# Patient Record
Sex: FEMALE | Race: OTHER | Employment: OTHER | URBAN - METROPOLITAN AREA
[De-identification: names, ages, dates, MRNs, and addresses within clinical notes are randomized per-mention and may not be internally consistent; named-entity substitution may affect disease eponyms.]

---

## 2017-06-26 ENCOUNTER — HOSPITAL ENCOUNTER (OUTPATIENT)
Dept: CT IMAGING | Age: 72
Discharge: HOME | End: 2017-06-26
Attending: INTERNAL MEDICINE
Payer: COMMERCIAL

## 2017-06-26 DIAGNOSIS — Z85.3 PERSONAL HISTORY OF MALIGNANT NEOPLASM OF BREAST: ICD-10-CM

## 2017-06-26 LAB — CREAT BLD-MCNC: 0.8 MG/DL (ref 0.6–1.3)

## 2017-06-26 PROCEDURE — 74011250636 HC RX REV CODE- 250/636: Performed by: INTERNAL MEDICINE

## 2017-06-26 PROCEDURE — 74011636320 HC RX REV CODE- 636/320: Performed by: INTERNAL MEDICINE

## 2017-06-26 PROCEDURE — 82565 ASSAY OF CREATININE: CPT

## 2017-06-26 PROCEDURE — 71260 CT THORAX DX C+: CPT

## 2017-06-26 RX ORDER — SODIUM CHLORIDE 9 MG/ML
50 INJECTION, SOLUTION INTRAVENOUS
Status: COMPLETED | OUTPATIENT
Start: 2017-06-26 | End: 2017-06-26

## 2017-06-26 RX ORDER — SODIUM CHLORIDE 0.9 % (FLUSH) 0.9 %
10 SYRINGE (ML) INJECTION
Status: COMPLETED | OUTPATIENT
Start: 2017-06-26 | End: 2017-06-26

## 2017-06-26 RX ADMIN — SODIUM CHLORIDE 50 ML/HR: 900 INJECTION, SOLUTION INTRAVENOUS at 07:14

## 2017-06-26 RX ADMIN — Medication 10 ML: at 07:14

## 2017-06-26 RX ADMIN — IOPAMIDOL 80 ML: 612 INJECTION, SOLUTION INTRAVENOUS at 07:14

## 2017-07-31 RX ORDER — DICLOFENAC SODIUM 75 MG/1
75 TABLET, DELAYED RELEASE ORAL 2 TIMES DAILY
Qty: 60 TAB | Refills: 11 | Status: SHIPPED | OUTPATIENT
Start: 2017-07-31 | End: 2018-07-25

## 2017-07-31 NOTE — TELEPHONE ENCOUNTER
Requested Prescriptions     Pending Prescriptions Disp Refills    diclofenac EC (VOLTAREN) 75 mg EC tablet 60 Tab 11     Sig: Take 1 Tab by mouth two (2) times a day.

## 2017-08-04 RX ORDER — HYDROCHLOROTHIAZIDE 25 MG/1
TABLET ORAL
Qty: 90 TAB | Refills: 0 | Status: SHIPPED | OUTPATIENT
Start: 2017-08-04 | End: 2017-10-30 | Stop reason: SDUPTHER

## 2017-08-24 PROBLEM — K58.9 IBS (IRRITABLE BOWEL SYNDROME): Status: ACTIVE | Noted: 2017-08-24

## 2017-08-24 PROBLEM — Z79.2 PROPHYLACTIC ANTIBIOTIC: Status: ACTIVE | Noted: 2017-08-24

## 2017-08-24 PROBLEM — K92.2 GI BLEED: Status: ACTIVE | Noted: 2017-08-24

## 2017-08-24 PROBLEM — I12.9 HYPERTENSION WITH RENAL DISEASE: Status: ACTIVE | Noted: 2017-08-24

## 2017-08-24 PROBLEM — M79.10 MYALGIA: Status: ACTIVE | Noted: 2017-08-24

## 2017-08-24 PROBLEM — N18.2 CKD (CHRONIC KIDNEY DISEASE), STAGE II: Status: ACTIVE | Noted: 2017-08-24

## 2017-08-24 PROBLEM — Z79.899 ON STATIN THERAPY: Status: ACTIVE | Noted: 2017-08-24

## 2017-08-24 PROBLEM — M35.3 POLYMYALGIA RHEUMATICA (HCC): Status: ACTIVE | Noted: 2017-08-24

## 2017-08-24 PROBLEM — M15.9 DJD (DEGENERATIVE JOINT DISEASE), MULTIPLE SITES: Status: ACTIVE | Noted: 2017-08-24

## 2017-08-24 PROBLEM — N32.81 OVERACTIVE BLADDER: Status: ACTIVE | Noted: 2017-08-24

## 2017-08-24 PROBLEM — M87.059 AVASCULAR NECROSIS OF HIP (HCC): Status: ACTIVE | Noted: 2017-08-24

## 2017-08-24 PROBLEM — R79.89 ABNORMAL LFTS: Status: ACTIVE | Noted: 2017-08-24

## 2017-08-24 RX ORDER — MUPIROCIN 20 MG/G
OINTMENT TOPICAL DAILY
COMMUNITY
End: 2017-10-02 | Stop reason: ALTCHOICE

## 2017-08-24 RX ORDER — CLINDAMYCIN HYDROCHLORIDE 300 MG/1
300 CAPSULE ORAL 3 TIMES DAILY
COMMUNITY
End: 2018-01-08 | Stop reason: ALTCHOICE

## 2017-08-24 RX ORDER — MOMETASONE FUROATE 1 MG/G
CREAM TOPICAL
COMMUNITY
End: 2019-03-20

## 2017-08-24 RX ORDER — LEVALBUTEROL 1.25 MG/.5ML
1.25 SOLUTION, CONCENTRATE RESPIRATORY (INHALATION) AS NEEDED
COMMUNITY
End: 2019-03-20

## 2017-08-24 RX ORDER — BISMUTH SUBSALICYLATE 262 MG
1 TABLET,CHEWABLE ORAL DAILY
COMMUNITY
End: 2020-03-18 | Stop reason: ALTCHOICE

## 2017-08-24 RX ORDER — AZELAIC ACID 0.15 G/G
GEL TOPICAL 2 TIMES DAILY
COMMUNITY
End: 2020-08-13 | Stop reason: ALTCHOICE

## 2017-08-24 RX ORDER — ACETYLCYSTEINE 600 MG
CAPSULE ORAL
COMMUNITY
End: 2017-10-02 | Stop reason: ALTCHOICE

## 2017-08-24 RX ORDER — ALBUTEROL SULFATE 90 UG/1
AEROSOL, METERED RESPIRATORY (INHALATION)
COMMUNITY
End: 2018-10-15 | Stop reason: ALTCHOICE

## 2017-08-24 RX ORDER — PRAVASTATIN SODIUM 40 MG/1
40 TABLET ORAL
COMMUNITY
End: 2018-01-22 | Stop reason: DRUGHIGH

## 2017-09-19 RX ORDER — OXAZEPAM 15 MG/1
CAPSULE ORAL
Qty: 60 CAP | Refills: 2 | Status: SHIPPED | OUTPATIENT
Start: 2017-09-19 | End: 2018-01-08 | Stop reason: ALTCHOICE

## 2017-09-19 NOTE — TELEPHONE ENCOUNTER
Requested Prescriptions     Pending Prescriptions Disp Refills    oxazepam (SERAX) 15 mg capsule [Pharmacy Med Name: OXAZEPAM 15MG CAPSULES] 60 Cap 2     Sig: TAKE 2 CAPSULES BY MOUTH EVERY NIGHT AT BEDTIME

## 2017-10-02 ENCOUNTER — OFFICE VISIT (OUTPATIENT)
Dept: INTERNAL MEDICINE CLINIC | Age: 72
End: 2017-10-02

## 2017-10-02 VITALS
RESPIRATION RATE: 18 BRPM | BODY MASS INDEX: 32.58 KG/M2 | DIASTOLIC BLOOD PRESSURE: 80 MMHG | OXYGEN SATURATION: 18 % | HEIGHT: 64 IN | SYSTOLIC BLOOD PRESSURE: 122 MMHG | TEMPERATURE: 97.9 F | HEART RATE: 78 BPM | WEIGHT: 190.8 LBS

## 2017-10-02 DIAGNOSIS — E78.2 MIXED HYPERLIPIDEMIA: ICD-10-CM

## 2017-10-02 DIAGNOSIS — I12.9 HYPERTENSION WITH RENAL DISEASE: ICD-10-CM

## 2017-10-02 DIAGNOSIS — Z12.11 COLON CANCER SCREENING: ICD-10-CM

## 2017-10-02 DIAGNOSIS — T75.89XA EFFECTS OF EXPOSURE TO EXTERNAL CAUSE, INITIAL ENCOUNTER: ICD-10-CM

## 2017-10-02 DIAGNOSIS — M15.9 PRIMARY OSTEOARTHRITIS INVOLVING MULTIPLE JOINTS: ICD-10-CM

## 2017-10-02 DIAGNOSIS — Z00.00 ANNUAL PHYSICAL EXAM: Primary | ICD-10-CM

## 2017-10-02 DIAGNOSIS — K58.9 IRRITABLE BOWEL SYNDROME, UNSPECIFIED TYPE: ICD-10-CM

## 2017-10-02 DIAGNOSIS — N18.2 CKD (CHRONIC KIDNEY DISEASE), STAGE II: ICD-10-CM

## 2017-10-02 DIAGNOSIS — J30.89 NON-SEASONAL ALLERGIC RHINITIS, UNSPECIFIED CHRONICITY, UNSPECIFIED TRIGGER: ICD-10-CM

## 2017-10-02 DIAGNOSIS — E11.9 TYPE 2 DIABETES MELLITUS WITHOUT COMPLICATION, WITHOUT LONG-TERM CURRENT USE OF INSULIN (HCC): ICD-10-CM

## 2017-10-02 DIAGNOSIS — E55.9 VITAMIN D DEFICIENCY: ICD-10-CM

## 2017-10-02 DIAGNOSIS — E66.09 CLASS 1 OBESITY DUE TO EXCESS CALORIES WITHOUT SERIOUS COMORBIDITY WITH BODY MASS INDEX (BMI) OF 32.0 TO 32.9 IN ADULT: ICD-10-CM

## 2017-10-02 DIAGNOSIS — Z23 ENCOUNTER FOR IMMUNIZATION: ICD-10-CM

## 2017-10-02 LAB
ALBUMIN SERPL-MCNC: 4.6 G/DL (ref 3.9–5.4)
ALKALINE PHOS POC: 89 U/L (ref 38–126)
ALT SERPL-CCNC: 69 U/L (ref 9–52)
AST SERPL-CCNC: 45 U/L (ref 14–36)
BACTERIA UA POCT, BACTPOCT: NORMAL
BILIRUB UR QL STRIP: NEGATIVE
BUN BLD-MCNC: 24 MG/DL (ref 7–17)
CALCIUM BLD-MCNC: 9.5 MG/DL (ref 8.4–10.2)
CASTS UA POCT: NORMAL
CHLORIDE BLD-SCNC: 104 MMOL/L (ref 98–107)
CHOLEST SERPL-MCNC: ABNORMAL MG/DL (ref 0–200)
CK (CPK) POC: 75 U/L (ref 30–135)
CLUE CELLS, CLUEPOCT: NEGATIVE
CO2 POC: 29 MMOL/L (ref 22–32)
CREAT BLD-MCNC: 0.7 MG/DL (ref 0.7–1.2)
CRYSTALS UA POCT, CRYSPOCT: NEGATIVE
EGFR (POC): 87.8
EPITHELIAL CELLS POCT: NORMAL
GLUCOSE POC: 142 MG/DL (ref 65–105)
GLUCOSE UR-MCNC: NEGATIVE MG/DL
GRAN# POC: 3.6 K/UL (ref 2–7.8)
GRAN% POC: 69.1 % (ref 37–92)
HBA1C MFR BLD HPLC: 6.7 % (ref 4.5–5.7)
HCT VFR BLD CALC: 40.5 % (ref 37–51)
HDLC SERPL-MCNC: 48 MG/DL (ref 35–130)
HGB BLD-MCNC: 13.8 G/DL (ref 12–18)
KETONES P FAST UR STRIP-MCNC: NEGATIVE MG/DL
LDL CHOLESTEROL POC: 151.6 MG/DL (ref 0–130)
LY# POC: 1.3 K/UL (ref 0.6–4.1)
LY% POC: 27.5 % (ref 10–58.5)
MCH RBC QN: 33.2 PG (ref 26–32)
MCHC RBC-ENTMCNC: 34 G/DL (ref 30–36)
MCV RBC: 97 FL (ref 80–97)
MICROALBUMIN UR TEST STR-MCNC: 20 MG/L (ref 0–20)
MID #, POC: 0.1 K/UL (ref 0–1.8)
MID% POC: 3.4 % (ref 0.1–24)
MUCUS UA POCT, MUCPOCT: NORMAL
PH UR STRIP: 6 [PH] (ref 5–7)
PLATELET # BLD: 247 K/UL (ref 140–440)
POTASSIUM SERPL-SCNC: 4 MMOL/L (ref 3.6–5)
PROT SERPL-MCNC: 7.5 G/DL (ref 6.3–8.2)
PROT UR QL STRIP: NORMAL MG/DL
RBC # BLD: 4.16 M/UL (ref 4.2–6.3)
RBC UA POCT, RBCPOCT: NORMAL
SODIUM SERPL-SCNC: 147 MMOL/L (ref 137–145)
SP GR UR STRIP: 1.02 (ref 1.01–1.02)
T4 FREE SERPL-MCNC: 1.01 NG/DL (ref 0.71–1.85)
TCHOL/HDL RATIO (POC): 5.2 (ref 0–4)
TOTAL BILIRUBIN POC: 0.7 MG/DL (ref 0.2–1.3)
TRICH UA POCT, TRICHPOC: NEGATIVE
TRIGL SERPL-MCNC: 252 MG/DL (ref 0–200)
TSH BLD-ACNC: 1.35 UIU/ML (ref 0.4–4.2)
UA UROBILINOGEN AMB POC: NORMAL (ref 0.2–1)
URINALYSIS CLARITY POC: CLEAR
URINALYSIS COLOR POC: NORMAL
URINE BLOOD POC: NEGATIVE
URINE CULT COMMENT, POCT: NORMAL
URINE LEUKOCYTES POC: NORMAL
URINE NITRITES POC: NEGATIVE
VITAMIN D POC: 54.3 NG/ML (ref 30–96)
VLDLC SERPL CALC-MCNC: 50.4 MG/DL
WBC # BLD: 5 K/UL (ref 4.1–10.9)
WBC UA POCT, WBCPOCT: NORMAL
YEAST UA POCT, YEASTPOC: NEGATIVE

## 2017-10-02 RX ORDER — MUPIROCIN 20 MG/G
OINTMENT TOPICAL DAILY
Qty: 22 G | Refills: 0 | Status: SHIPPED | OUTPATIENT
Start: 2017-10-02 | End: 2018-01-05 | Stop reason: ALTCHOICE

## 2017-10-02 RX ORDER — ERGOCALCIFEROL 1.25 MG/1
50000 CAPSULE ORAL
COMMUNITY
End: 2018-01-08 | Stop reason: ALTCHOICE

## 2017-10-02 NOTE — PROGRESS NOTES
1. Have you been to the ER, urgent care clinic since your last visit? Hospitalized since your last visit? No    2. Have you seen or consulted any other health care providers outside of the [address] [place] [address] since your last visit? Include any pap smears or colon screening. Neeta Moncada and Dr.Victora Moser, derm. Flu vaccine given    3 month follow up    Annual Wellness exam, not medicare.

## 2017-10-02 NOTE — PROGRESS NOTES
Annual Wellness Visit; Hypertension (3 month); and Cholesterol Problem       HPI:     Poly Estrada is a 81y.o. year old female who is here for her annual comprehensive healthcare exam and follow-up of medical problems to include hypertension, diabetes, hyperlipidemia, anxiety, chronic kidney disease stage II, DJD, irritable bowel, obesity, and allergic rhinitis. She does have a burn to the skin on her right breast reconstructive area from a heating pad about a month to ago. It does not seem to be healing but she notes no associated pain because she does not have pain in that area secondary to reconstructive surgery. She denies any chest pain shortness of breath or cardiorespiratory complaints. There are no GI/ complaints. There are no other complaints on complete review of systems. She is taking the medication trying to follow her diet and trying to get some exercise but could do better there. Visit Vitals    /80 (BP 1 Location: Left arm, BP Patient Position: Sitting)    Pulse 78    Temp 97.9 °F (36.6 °C) (Oral)    Resp 18    Ht 5' 4\" (1.626 m)    Wt 190 lb 12.8 oz (86.5 kg)    SpO2 (!) 18%    BMI 32.75 kg/m2       Past Medical History:   Diagnosis Date    Abnormal LFTs 8/24/2017    Arthritis     OSTEO    Avascular necrosis of hip (HCC) 8/24/2017    Breast CA (HCC)     BILATERAL    Chronic pain     CKD (chronic kidney disease), stage II 8/24/2017    Coagulation disorder ([facility] [address] 1984    ITP    Depression     Diabetes (HCC)     TYPE 2; NIDDM    DJD (degenerative joint disease), multiple sites 8/24/2017    GI bleed 8/24/2017    Hyperlipemia     Hypertension     Hypertension with renal disease 8/24/2017    IBS (irritable bowel syndrome) 8/24/2017    Myalgia 8/24/2017    On statin therapy 8/24/2017    Overactive bladder 8/24/2017    Pneumonia 04/2015    HOSPITALIZED 3 WEEKS.     Polymyalgia rheumatica ([facility] [address] 8/24/2017    Prophylactic antibiotic 8/24/2017    Rosacea Past Surgical History:   Procedure Laterality Date    BREAST SURGERY PROCEDURE UNLISTED      RECONSTRUCTION X2    HX APPENDECTOMY  1950    HX CATARACT REMOVAL Bilateral     W /IOL    HX GI      COLONOSCOPY    HX HEENT      WISDOM TEETH    HX HYSTERECTOMY  2000S    HX MASTECTOMY  1989    bilateral    HX MASTECTOMY  2001    HX ORTHOPAEDIC      back fusion 2008-LUMBAR    HX TUBAL LIGATION  1981    TOTAL HIP ARTHROPLASTY Left 11/16/2016    ANTERIOR APPROACH, DR Gwendolyn De La Torre; (POSTOP: STANDS ONE INCH TALLER ON LEFT FOOT)       Prior to Admission medications    Medication Sig Start Date End Date Taking? Authorizing Provider   ergocalciferol (VITAMIN D2) 50,000 unit capsule Take 50,000 Units by mouth. Take 1 capsule weekly for 12 weeks. Yes Historical Provider   mupirocin (BACTROBAN) 2 % ointment Apply  to affected area daily. 10/2/17  Yes Anitra Martin MD   oxazepam (SERAX) 15 mg capsule TAKE 2 CAPSULES BY MOUTH EVERY NIGHT AT BEDTIME 9/19/17  Yes Anitra Martin MD   albuterol (VENTOLIN HFA) 90 mcg/actuation inhaler Take  by inhalation. Yes Historical Provider   levalbuterol (XOPENEX) 1.25 mg/0.5 mL nebu 1.25 mg by Nebulization route. Yes Historical Provider   loratadine-pseudoephedrine (CLARITIN-D 12 HOUR) 5-120 mg per tablet Take 1 Tab by mouth two (2) times a day. Yes Historical Provider   clindamycin (CLEOCIN) 300 mg capsule Take 300 mg by mouth three (3) times daily. Yes Historical Provider   pravastatin (PRAVACHOL) 40 mg tablet Take 40 mg by mouth nightly. Yes Historical Provider   GLUCOSAMINE/MSM/CHONDROITIN A (GLUCOSAMINE HCL-MSM-CHONDROITN PO) Take  by mouth. Yes Historical Provider   multivitamin (ONE A DAY) tablet Take 1 Tab by mouth daily. Yes Historical Provider   SHARK CARTILAGE PO Take  by mouth. Yes Historical Provider   azelaic acid (FINACEA) 15 % topical gel Apply  to affected area two (2) times a day.    Yes Historical Provider   hydroCHLOROthiazide (HYDRODIURIL) 25 mg tablet TAKE 1 TABLET BY MOUTH DAILY 8/4/17  Yes Swapna Carbone MD   diclofenac EC (VOLTAREN) 75 mg EC tablet Take 1 Tab by mouth two (2) times a day. 7/31/17  Yes Swapna Carbone MD   aspirin delayed-release 325 mg tablet Take 1 Tab by mouth two (2) times a day. Patient taking differently: Take 325 mg by mouth daily as needed. 12/29/16  Yes Giovanny Guerrier MD   metFORMIN ER (GLUCOPHAGE XR) 500 mg tablet Take 500 mg by mouth two (2) times a day. Yes Historical Provider   sertraline (ZOLOFT) 100 mg tablet Take 100 mg by mouth daily. Yes Historical Provider   carvedilol (COREG) 6.25 mg tablet Take 3.125 mg by mouth two (2) times daily (with meals). Yes Historical Provider   clindamycin (CLINDAGEL) 1 % topical gel Apply  to affected area two (2) times a day. use thin film on affected area   Yes Historical Provider   buPROPion XL (WELLBUTRIN XL) 150 mg tablet Take 150 mg by mouth every morning. Yes Historical Provider   mometasone (ELOCON) 0.1 % topical cream Apply  to affected area daily. Historical Provider        Allergies   Allergen Reactions    Statins-Hmg-Coa Reductase Inhibitors Myalgia     Myalgia with pravachol and multiple statins    Codeine Rash     Rash on thighs    Darvon [Propoxyphene] Other (comments)     \"I see worms\"    Pcn [Penicillins] Rash    Sulfa (Sulfonamide Antibiotics) Rash        Family History   Problem Relation Age of Onset    Heart Disease Mother     Kidney Disease Mother     Lung Disease Mother      COPD    Anesth Problems Neg Hx        Social History     Social History    Marital status:      Spouse name: N/A    Number of children: N/A    Years of education: N/A     Occupational History    Not on file.      Social History Main Topics    Smoking status: Never Smoker    Smokeless tobacco: Never Used    Alcohol use No    Drug use: No    Sexual activity: No     Other Topics Concern    Not on file     Social History Narrative        ROS: Constitutional: She denies fevers, weight loss, sweats. Eyes: No blurred or double vision. ENT: No difficulty with swallowing, taste, speech or smell. NECK: no stiffness swelling or lymph node enlargement  Respiratory: No cough wheezing or shortness of breath. Cardiovascular: Denies chest pain, palpitations, unexplained indigestion or syncope. Breast: She has noted no masses or lumps and no discharge or axillary swelling  Gastrointestinal:  No changes in bowel movements, no abdominal pain, no bloating. Genitourinary: No discharge or abnormal bleeding or pain  Extremities: No joint pain, stiffness or swelling. Neurological:  No numbness, tingling, burring paresthesias or loss of motor strength. No syncope, dizziness or frequent headache  Skin:  No recent rashes or mole changes. Burn right upper outer quadrant of the right breast  Psychiatric/Behavioral:  Negative for depression. The patient is not nervous/anxious. HEMATOLOGIC: no easy bruising or bleeding gums  Endocrine: no sweats of urinary frequency or excessive thirst      Physical Examination:     Vitals:    10/02/17 0826   BP: 122/80   Pulse: 78   Resp: 18   Temp: 97.9 °F (36.6 °C)   TempSrc: Oral   SpO2: (!) 18%   Weight: 190 lb 12.8 oz (86.5 kg)   Height: 5' 4\" (1.626 m)   PainSc:   0 - No pain        General appearance - alert, well appearing, and in no distress  Mental status - alert, oriented to person, place, and time  HEENT:  Ears - bilateral TM's and external ear canals clear  Eyes - pupillary responses were normal.  Extraocular muscle function intact. Lids and conjunctiva not injected. Fundoscopic exam revealed sharp disc margins. eye movements intact  Pharynx- clear with teeth in good repair. No masses were noted  Neck - supple without thyromegaly or burit. No JVD noted  Lungs - clear to auscultation and percussion  Cardiac- normal rate, regular rhythm without murmurs. PMI not displaced.   No gallop, rub or click  Breast: Status post bilateral mastectomy reconstruction. Abdomen - flat, soft, non-tender without palpable organomegaly or mass. No pulsatile mass was felt, and not bruit was heard. Bowel sounds were active   Female - deferred to GYN  Rectal - deferred to GYN  Extremities -  no clubbing cyanosis or edema. No diabetic foot changes noted  Lymphatics - no palpable lymphadenopathy, no hepatosplenomegaly  Peripheral vascular - Dorsalis pedis and posterior tibial pulses felt without difficulty  Skin - no rash or unusual mole change noted. 3 cm slightly irregular circular area right upper outer quadrant right breast from a burn no drainage noted. Neurological - Cranial nerves II-XII grossly intact. Motor strength 5/5. DTR's 2+ and symmetric. Station and gait normal.  Normal distal sensation and proprioception all toes both feet  Back exam - full range of motion, no tenderness, palpable spasm or pain on motion  Musculoskeletal - no joint tenderness, deformity or swelling  Hematologic: no purpura, petechiae or bruising    Assessment/Plan:     1. Annual physical exam    2. Hypertension with renal disease    3. Mixed hyperlipidemia    4. Primary osteoarthritis involving multiple joints    5. Type 2 diabetes mellitus without complication, without long-term current use of insulin ([facility] [address]    6. CKD (chronic kidney disease), stage II    7. Class 1 obesity due to excess calories without serious comorbidity with body mass index (BMI) of 32.0 to 32.9 in adult    8. Irritable bowel syndrome, unspecified type    9. Non-seasonal allergic rhinitis, unspecified chronicity, unspecified trigger    10. Vitamin D deficiency    11. Effects of exposure to external cause, initial encounter    12. Colon cancer screening    13. Encounter for immunization        Impression  1. Annual physical examination normal except for obesity still an issue weight 198.8 needs to come down with discussed diet exercise weight reduction  2.   Hypertension that is controlled  3. Hyperlipidemia we will see what the status is  4. Diabetes repeat status pending reviewed prior labs with her  5. DJD seems stable  6. CKD stage II status pending  7. Obesity has discussed diet exercise weight reduction  8. IBS  9. Allergic rhinitis stable  10. Vitamin D deficiency we will check that level today  Burn right breast will try some Bactroban ointment for 2 weeks and then use a Telfa pad and dry dressing after that  Flu vaccine given today. Labs pending as noted will make further recommendations based upon those of all stable continue same and I will recheck her in 3 months or sooner should be a problem. Orders Placed This Encounter    Influenza virus vaccine (FLUZONE HIGH-DOSE) 65 years and older (16991)    HEPATITIS C AB    AMB POC FECAL OCCULT BLOOD QL-3 CARDS    AMB POC URINE, MICROALBUMIN, SEMIQUANT (1 RESULT)    AMB POC COMPREHENSIVE METABOLIC PANEL    AMB POC COMPLETE CBC,AUTOMATED ENTER    AMB POC CK (CPK)    AMB POC HEMOGLOBIN A1C    AMB POC LIPID PROFILE    AMB POC T4, FREE    AMB POC URINALYSIS DIP STICK AUTO W/ MICRO     AMB POC TSH    AMB POC VITAMIN D    OR IMMUNIZ ADMIN,1 SINGLE/COMB VAC/TOXOID    ergocalciferol (VITAMIN D2) 50,000 unit capsule     Sig: Take 50,000 Units by mouth. Take 1 capsule weekly for 12 weeks.  mupirocin (BACTROBAN) 2 % ointment     Sig: Apply  to affected area daily.      Dispense:  22 g     Refill:  0        Results for orders placed or performed in visit on 10/02/17   AMB POC URINE, MICROALBUMIN, SEMIQUANT (1 RESULT)   Result Value Ref Range    Microalbumin urine (POC)  0 - 20 MG/L   AMB POC COMPREHENSIVE METABOLIC PANEL   Result Value Ref Range    GLUCOSE  65 - 105 mg/dL    BUN  7 - 17 mg/dL    Creatinine (POC)  0.7 - 1.2 mg/dL    Sodium (POC)  137 - 145 MMOL/L    Potassium (POC)  3.6 - 5.0 MMOL/L    CHLORIDE  98 - 107 MMOL/L    CO2  22 - 32 MMOL/L    CALCIUM  8.4 - 10.2 mg/dL    TOTAL PROTEIN  6.3 - 8.2 g/dL    ALBUMIN 3.9 - 5.4 g/dL    AST (POC)  14 - 36 U/L    ALT (POC)  9 - 52 U/L    ALKALINE PHOS  38 - 126 U/L    TOTAL BILIRUBIN  0.2 - 1.3 mg/dL    eGFR (POC)     AMB POC COMPLETE CBC,AUTOMATED ENTER   Result Value Ref Range    WBC (POC)  4.1 - 10.9 K/uL    RBC (POC)  4.20 - 6.30 M/uL    HGB (POC)  12.0 - 18.0 g/dL    HCT (POC)  37.0 - 51.0 %    MCV (POC)  80.0 - 97.0 fL    MCH (POC)  26.0 - 32.0 pg    MCHC (POC)  30.0 - 36.0 g/dL    PLATELET (POC)  198.6 - 440.0 K/uL    ABS. LYMPHS (POC)  0.6 - 4.1 K/uL    LYMPHOCYTES (POC)  10.0 - 58.5 %    Mid # (POC)  0.0 - 1.8 K/uL    MID% POC  0.1 - 24.0 %    ABS.  GRANS (POC)  2.0 - 7.8 K/uL    GRANULOCYTES (POC)  37.0 - 92.0 %   AMB POC CK (CPK)   Result Value Ref Range    CK (CPK) (POC)  30 - 135 U/L   AMB POC HEMOGLOBIN A1C   Result Value Ref Range    Hemoglobin A1c (POC)  4.5 - 5.7 %   AMB POC LIPID PROFILE   Result Value Ref Range    Cholesterol (POC)  0 - 200 mg/dL    Triglycerides (POC)  0 - 200 mg/dL    HDL Cholesterol (POC)  35 - 130 mg/dL    VLDL (POC)  MG/DL    LDL Cholesterol (POC)  0.0 - 130.0 MG/DL    TChol/HDL Ratio (POC)  0.0 - 4.0   AMB POC T4, FREE   Result Value Ref Range    FREE T4 (POC)  0.71 - 1.85 ng/dL   AMB POC URINALYSIS DIP STICK AUTO W/ MICRO    Result Value Ref Range    Color (UA POC)      Clarity (UA POC)      Glucose (UA POC)  Negative    Bilirubin (UA POC)  Negative    Ketones (UA POC)  Negative    Specific gravity (UA POC)  1.010 - 1.025    Blood (UA POC)  Negative    pH (UA POC)  5.0 - 7.0    Protein (UA POC)  Negative mg/dL    Urobilinogen (UA POC)  0.2 - 1    Nitrites (UA POC)  Negative    Leukocyte esterase (UA POC)  Negative    Epithelial cells (UA POC)      Mucus (UA POC)      WBCs (UA POC)      RBCs (UA POC)      Casts (UA POC)  Negative    Crystals (UA POC)  Negative    Clue Cells (UA POC)      Trichomonas (UA POC)      Yeast (UA POC)      Bacteria (UA POC)  Negative    URINE CULT COMMENT (UA POC)     AMB POC TSH   Result Value Ref Range    TSH POC  0.40 - 4.20 uIU/ml   AMB POC VITAMIN D   Result Value Ref Range    Vitamin D (POC)  30 - 96 ng/mL       I have reviewed the patient's medical history in detail and updated the computerized patient record. We had a prolonged discussion about these complex clinical issues and went over the various important aspects to consider. All questions were answered. Advised her to call back or return to office if symptoms do not improve, change in nature, or persist.    She was given an after visit summary or informed of Knowable Access which includes patient instructions, diagnoses, current medications, & vitals. She expressed understanding with the diagnosis and plan.       Jessica Blanco MD

## 2017-10-03 LAB — HCV AB S/CO SERPL IA: <0.1 S/CO RATIO (ref 0–0.9)

## 2017-10-26 RX ORDER — BUPROPION HYDROCHLORIDE 150 MG/1
TABLET ORAL
Qty: 90 TAB | Refills: 0 | Status: SHIPPED | OUTPATIENT
Start: 2017-10-26 | End: 2018-07-06 | Stop reason: SDUPTHER

## 2017-10-30 DIAGNOSIS — I10 HYPERTENSION, UNSPECIFIED TYPE: Primary | ICD-10-CM

## 2017-10-31 RX ORDER — HYDROCHLOROTHIAZIDE 25 MG/1
TABLET ORAL
Qty: 90 TAB | Refills: 3 | Status: SHIPPED | OUTPATIENT
Start: 2017-10-31 | End: 2019-01-17 | Stop reason: SDUPTHER

## 2017-10-31 NOTE — TELEPHONE ENCOUNTER
Requested Prescriptions     Pending Prescriptions Disp Refills    hydroCHLOROthiazide (HYDRODIURIL) 25 mg tablet [Pharmacy Med Name: HYDROCHLOROTHIAZIDE 25MG TABLETS] 90 Tab 3     Sig: TAKE 1 TABLET BY MOUTH DAILY

## 2018-01-05 ENCOUNTER — OFFICE VISIT (OUTPATIENT)
Dept: INTERNAL MEDICINE CLINIC | Age: 73
End: 2018-01-05

## 2018-01-05 VITALS
WEIGHT: 194 LBS | DIASTOLIC BLOOD PRESSURE: 88 MMHG | SYSTOLIC BLOOD PRESSURE: 132 MMHG | RESPIRATION RATE: 18 BRPM | TEMPERATURE: 98.4 F | BODY MASS INDEX: 33.12 KG/M2 | HEART RATE: 84 BPM | HEIGHT: 64 IN | OXYGEN SATURATION: 97 %

## 2018-01-05 DIAGNOSIS — J01.00 ACUTE NON-RECURRENT MAXILLARY SINUSITIS: Primary | ICD-10-CM

## 2018-01-05 DIAGNOSIS — E66.09 CLASS 1 OBESITY DUE TO EXCESS CALORIES WITHOUT SERIOUS COMORBIDITY WITH BODY MASS INDEX (BMI) OF 32.0 TO 32.9 IN ADULT: ICD-10-CM

## 2018-01-05 PROBLEM — F33.9 RECURRENT DEPRESSION (HCC): Status: ACTIVE | Noted: 2018-01-05

## 2018-01-05 RX ORDER — AZITHROMYCIN 250 MG/1
250 TABLET, FILM COATED ORAL SEE ADMIN INSTRUCTIONS
Qty: 6 TAB | Refills: 0 | Status: SHIPPED | OUTPATIENT
Start: 2018-01-05 | End: 2018-01-10

## 2018-01-05 NOTE — PROGRESS NOTES
Subjective:   Day Umanzor is a 81 y.o. female      No chief complaint on file. History of present illness: She presents complaining a lot of head and nasal congestion a lot of sinus drainage. She does note some resultant cough. There may been a low-grade fever she has had no chills. This is been going on now for at least a week and she just cannot kick it at home. She denies any other complaints on complete review of systems. Patient Active Problem List   Diagnosis Code    Diabetes ([facility] E11.9    Allergic rhinitis J30.9    Obesity E66.9    Rosacea L71.9    Depression F32.9    Hyperlipidemia E78.5    Anxiety F41.9    Pneumonia J18.9    GI bleed K92.2    Overactive bladder N32.81    Polymyalgia rheumatica ([facility] M35.3    On statin therapy Z79.899    IBS (irritable bowel syndrome) K58.9    Hypertension with renal disease I12.9    DJD (degenerative joint disease), multiple sites M15.9    CKD (chronic kidney disease), stage II N18.2    Avascular necrosis of hip (HCC) M87.059    Abnormal LFTs R79.89    Annual physical exam Z00.00    Vitamin D deficiency E55.9    Recurrent depression ([facility]) F33.9    Acute non-recurrent maxillary sinusitis J01.00      Past Medical History:   Diagnosis Date    Abnormal LFTs 8/24/2017    Arthritis     OSTEO    Avascular necrosis of hip (HCC) 8/24/2017    Breast CA (HCC)     BILATERAL    Chronic pain     CKD (chronic kidney disease), stage II 8/24/2017    Coagulation disorder ([facility] 1984    ITP    Depression     Diabetes (HCC)     TYPE 2; NIDDM    DJD (degenerative joint disease), multiple sites 8/24/2017    GI bleed 8/24/2017    Hyperlipemia     Hypertension     Hypertension with renal disease 8/24/2017    IBS (irritable bowel syndrome) 8/24/2017    Myalgia 8/24/2017    On statin therapy 8/24/2017    Overactive bladder 8/24/2017    Pneumonia 04/2015    HOSPITALIZED 3 WEEKS.     Polymyalgia rheumatica (HCC) 8/24/2017    Prophylactic antibiotic 8/24/2017    Rosacea       Allergies   Allergen Reactions    Statins-Hmg-Coa Reductase Inhibitors Myalgia     Myalgia with pravachol and multiple statins    Codeine Rash     Rash on thighs    Darvon [Propoxyphene] Other (comments)     \"I see worms\"    Pcn [Penicillins] Rash    Sulfa (Sulfonamide Antibiotics) Rash      Family History   Problem Relation Age of Onset    Heart Disease Mother     Kidney Disease Mother     Lung Disease Mother      COPD    Anesth Problems Neg Hx       Social History     Social History    Marital status:      Spouse name: N/A    Number of children: N/A    Years of education: N/A     Occupational History    Not on file. Social History Main Topics    Smoking status: Never Smoker    Smokeless tobacco: Never Used    Alcohol use No    Drug use: No    Sexual activity: No     Other Topics Concern    Not on file     Social History Narrative     Prior to Admission medications    Medication Sig Start Date End Date Taking? Authorizing Provider   azithromycin (ZITHROMAX) 250 mg tablet Take 1 Tab by mouth See Admin Instructions for 5 days. Take 2 tablets the first day, then 1 tablet daily for the next four days. 1/5/18 1/10/18 Yes Eva Wilson MD   hydroCHLOROthiazide (HYDRODIURIL) 25 mg tablet TAKE 1 TABLET BY MOUTH DAILY 10/31/17  Yes Eva Wilson MD   buPROPion XL (WELLBUTRIN XL) 150 mg tablet TAKE 1 TABLET BY MOUTH EVERY DAY 10/26/17  Yes Eva Wilson MD   ergocalciferol (VITAMIN D2) 50,000 unit capsule Take 50,000 Units by mouth. Take 1 capsule weekly for 12 weeks. Yes Historical Provider   oxazepam (SERAX) 15 mg capsule TAKE 2 CAPSULES BY MOUTH EVERY NIGHT AT BEDTIME 9/19/17  Yes Eva Wilson MD   albuterol (VENTOLIN HFA) 90 mcg/actuation inhaler Take  by inhalation. Yes Historical Provider   levalbuterol (XOPENEX) 1.25 mg/0.5 mL nebu 1.25 mg by Nebulization route.    Yes Historical Provider   loratadine-pseudoephedrine (CLARITIN-D 12 HOUR) 5-120 mg per tablet Take 1 Tab by mouth two (2) times a day. Yes Historical Provider   clindamycin (CLEOCIN) 300 mg capsule Take 300 mg by mouth three (3) times daily. Yes Historical Provider   pravastatin (PRAVACHOL) 40 mg tablet Take 40 mg by mouth nightly. Yes Historical Provider   GLUCOSAMINE/MSM/CHONDROITIN A (GLUCOSAMINE HCL-MSM-CHONDROITN PO) Take  by mouth. Yes Historical Provider   multivitamin (ONE A DAY) tablet Take 1 Tab by mouth daily. Yes Historical Provider   SHARK CARTILAGE PO Take  by mouth. Yes Historical Provider   mometasone (ELOCON) 0.1 % topical cream Apply  to affected area daily. Yes Historical Provider   azelaic acid (FINACEA) 15 % topical gel Apply  to affected area two (2) times a day. Yes Historical Provider   diclofenac EC (VOLTAREN) 75 mg EC tablet Take 1 Tab by mouth two (2) times a day. 7/31/17  Yes Sofia Knowles MD   aspirin delayed-release 325 mg tablet Take 1 Tab by mouth two (2) times a day. Patient taking differently: Take 325 mg by mouth daily as needed. 12/29/16  Yes Peter Garnica MD   metFORMIN ER (GLUCOPHAGE XR) 500 mg tablet Take 500 mg by mouth two (2) times a day. Yes Historical Provider   sertraline (ZOLOFT) 100 mg tablet Take 100 mg by mouth daily. Yes Historical Provider   carvedilol (COREG) 6.25 mg tablet Take 3.125 mg by mouth two (2) times daily (with meals). Yes Historical Provider   clindamycin (CLINDAGEL) 1 % topical gel Apply  to affected area two (2) times a day. use thin film on affected area   Yes Historical Provider        Review of Systems              Constitutional:  She denies fever, weight loss, sweats or fatigue. EYES: No blurred or double vision,               ENT: Positive ahead and nasal congestion, no headache or dizziness. No difficulty with               swallowing, taste, speech or smell. Respiratory: Postnasal drainage with some cough without wheezing or shortness of breath.   No sputum production. Cardiac:  Denies chest pain, palpitations, unexplained indigestion, syncope, edema, PND or orthopnea. GI:  No changes in bowel movements, no abdominal pain, no bloating, anorexia, nausea, vomiting or heartburn. :  No frequency or dysuria. Denies incontinence or sexual dysfunction. Extremities:  No joint pain, stiffness or swelling  Back:.no pain or soreness  Skin:  No recent rashes or mole changes. Neurological:  No numbness, tingling, burning paresthesias or loss of motor strength. No syncope, dizziness, frequent headaches or memory loss. Hematologic:  No easy bruising  Lymphatic: No lymph node enlargement    Objective:     Vitals:    01/05/18 0936   BP: 132/88   Pulse: 84   Resp: 18   Temp: 98.4 °F (36.9 °C)   SpO2: 97%   Weight: 194 lb (88 kg)   Height: 5' 4\" (1.626 m)       Body mass index is 33.3 kg/(m^2). Physical Examination:              General Appearance:  Well-developed, well-nourished, no acute distress. HEENT:      Ears:  The TMs and ear canals were clear. Eyes:  The pupillary responses were normal.  Extraocular muscle function intact. Lids and conjunctiva not injected. Funduscopic exam revealed sharp disc margins. Nares: Inflamed mildly edematous  Pharynx:  Clear with teeth in good repair. No masses were noted. Neck:  Supple without thyromegaly or adenopathy. No JVD noted. No carotid                bruits. Lungs:  Clear to auscultation and percussion. Cardiac:  Regular rate and rhythm without murmur. PMI not displaced. No gallop, rub or click. Abdominal: Soft, non-tender, no hepata-spleenomegally or masses  Extremities:  No clubbing, cyanosis or edema. Skin:  No rash or unusual mole changes noted. Lymph Nodes:  None felt in the cervical, supraclavicular, axillary or inguinal region. Neurological: . DTRs 2+ and symmetric. Station and gait normal.   Hematologic:   No purpura or petechiae        Assessment/Plan:         1.  Acute non-recurrent maxillary sinusitis    2. Class 1 obesity due to excess calories without serious comorbidity with body mass index (BMI) of 32.0 to 32.9 in adult        Impressions/Plan:  Impression  1. Acute maxillary sinusitis we will treat this with his Zithromax Z-Brendon  2. Obesity we discussed importance of diet exercise weight reduction getting weight down which is unfortunately up 4 pounds since her last visit here  Recheck if not resolved with the above treatment otherwise per previous schedule. Orders Placed This Encounter    azithromycin (ZITHROMAX) 250 mg tablet       Follow-up Disposition:  Return if symptoms worsen or fail to improve. No results found for any visits on 01/05/18. Manny Hills MD    The patient was given after the visit summary the patient verbalized an understanding of the plans and problems as explained.

## 2018-01-05 NOTE — MR AVS SNAPSHOT
Visit Information Date & Time Provider Department Dept. Phone Encounter #  
 1/5/2018  9:10 AM Ellen Asif MD [place] [id number] [phone] [id number] Follow-up Instructions Return if symptoms worsen or fail to improve. Follow-up and Disposition History Your Appointments 1/15/2018  9:50 AM  
FOLLOW UP 10 with MD ADRIANA Doty [facility] ([facility]) Appt Note: [address] [facility] [address] [id number] [address] [facility] [address] Upcoming Health Maintenance Date Due DTaP/Tdap/Td series (1 - Tdap) 1/15/1968 FOBT Q 1 YEAR AGE 50-75 1/15/1997 ZOSTER VACCINE AGE 60> 11/15/2006 MEDICARE YEARLY EXAM 1/15/2012 HEMOGLOBIN A1C Q6M 4/2/2018 EYE EXAM RETINAL OR DILATED Q1 5/15/2018 FOOT EXAM Q1 10/2/2018 MICROALBUMIN Q1 10/2/2018 LIPID PANEL Q1 10/2/2018 GLAUCOMA SCREENING Q2Y 5/15/2019 BREAST CANCER SCRN MAMMOGRAM 10/2/2019 Allergies as of 1/5/2018  Review Complete On: 1/5/2018 By: Ellen Asif MD  
  
 Severity Noted Reaction Type Reactions Statins-hmg-coa Reductase Inhibitors High 11/20/2016    Myalgia Myalgia with pravachol and multiple statins Codeine  04/20/2015    Rash Rash on thighs Darvon [Propoxyphene]  04/20/2015    Other (comments) \"I see worms\" Pcn [Penicillins]  04/20/2015    Rash  
 Sulfa (Sulfonamide Antibiotics)  04/20/2015    Rash Current Immunizations  Reviewed on 5/6/2015 Name Date Influenza High Dose Vaccine PF 10/2/2017 Influenza Vaccine 11/2/2015 Pneumococcal Conjugate (PCV-13) 7/27/2015 Pneumococcal Vaccine (Unspecified Type) 1/1/2013, 12/23/2011 Not reviewed this visit You Were Diagnosed With   
  
 Codes Comments Acute non-recurrent maxillary sinusitis    -  Primary ICD-10-CM: J01.00 ICD-9-CM: 461.0 Class 1 obesity due to excess calories without serious comorbidity with body mass index (BMI) of 32.0 to 32.9 in adult     ICD-10-CM: E66.09, Z68.32 
ICD-9-CM: 278.00, V85.32 Vitals BP Pulse Temp Resp Height(growth percentile) Weight(growth percentile) 132/88 (BP 1 Location: Left arm, BP Patient Position: Sitting) 84 98.4 °F (36.9 °C) 18 5' 4\" (1.626 m) 194 lb (88 kg) SpO2 BMI OB Status Smoking Status 97% 33.3 kg/m2 Hysterectomy Never Smoker BMI and BSA Data Body Mass Index Body Surface Area  
 33.3 kg/m 2 1.99 m 2 Preferred Pharmacy Pharmacy Name Phone [facility] [phone] Your Updated Medication List  
  
   
This list is accurate as of: 1/5/18  9:52 AM.  Always use your most recent med list.  
  
  
  
  
 aspirin delayed-release 325 mg tablet Take 1 Tab by mouth two (2) times a day. azithromycin 250 mg tablet Commonly known as:  Dala Kenn Take 1 Tab by mouth See Admin Instructions for 5 days. Take 2 tablets the first day, then 1 tablet daily for the next four days. buPROPion  mg tablet Commonly known as:  WELLBUTRIN XL  
TAKE 1 TABLET BY MOUTH EVERY DAY  
  
 CLARITIN-D 12 HOUR 5-120 mg per tablet Generic drug:  loratadine-pseudoephedrine Take 1 Tab by mouth two (2) times a day. clindamycin 1 % topical gel Commonly known as:  CLINDAGEL Apply  to affected area two (2) times a day. use thin film on affected area  
  
 clindamycin 300 mg capsule Commonly known as:  CLEOCIN Take 300 mg by mouth three (3) times daily. COREG 6.25 mg tablet Generic drug:  carvedilol Take 3.125 mg by mouth two (2) times daily (with meals). diclofenac EC 75 mg EC tablet Commonly known as:  VOLTAREN Take 1 Tab by mouth two (2) times a day. ELOCON 0.1 % topical cream  
Generic drug:  mometasone Apply  to affected area daily. FINACEA 15 % topical gel Generic drug:  azelaic acid Apply  to affected area two (2) times a day. GLUCOSAMINE HCL-MSM-CHONDROITN PO Take  by mouth. hydroCHLOROthiazide 25 mg tablet Commonly known as:  HYDRODIURIL  
TAKE 1 TABLET BY MOUTH DAILY  
  
 levalbuterol 1.25 mg/0.5 mL Nebu Commonly known as:  XOPENEX  
1.25 mg by Nebulization route. metFORMIN  mg tablet Commonly known as:  GLUCOPHAGE XR Take 500 mg by mouth two (2) times a day. multivitamin tablet Commonly known as:  ONE A DAY Take 1 Tab by mouth daily. oxazepam 15 mg capsule Commonly known as:  SERAX TAKE 2 CAPSULES BY MOUTH EVERY NIGHT AT BEDTIME  
  
 pravastatin 40 mg tablet Commonly known as:  PRAVACHOL Take 40 mg by mouth nightly. sertraline 100 mg tablet Commonly known as:  ZOLOFT Take 100 mg by mouth daily. SHARK CARTILAGE PO Take  by mouth. VENTOLIN HFA 90 mcg/actuation inhaler Generic drug:  albuterol Take  by inhalation. VITAMIN D2 50,000 unit capsule Generic drug:  ergocalciferol Take 50,000 Units by mouth. Take 1 capsule weekly for 12 weeks. Prescriptions Sent to Pharmacy Refills  
 azithromycin (ZITHROMAX) 250 mg tablet 0 Sig: Take 1 Tab by mouth See Admin Instructions for 5 days. Take 2 tablets the first day, then 1 tablet daily for the next four days. Class: Normal  
 Pharmacy: [facility] #: [phone] Route: Oral  
  
Follow-up Instructions Return if symptoms worsen or fail to improve. Introducing [facility]! Marisol Valentin introduces [employer] patient portal. Now you can access parts of your medical record, email your doctor's office, and request medication refills online. 1. In your internet browser, go to [email or web address] 2. Click on the First Time User? Click Here link in the Sign In box.  You will see the New Member Sign Up page. 3. Enter your AQH Access Code exactly as it appears below. You will not need to use this code after youve completed the sign-up process. If you do not sign up before the expiration date, you must request a new code. · AQH Access Code: [email or web address] Expires: 4/5/2018  9:12 AM 
 
4. Enter the last four digits of your Social Security Number (xxxx) and Date of Birth (mm/dd/yyyy) as indicated and click Submit. You will be taken to the next sign-up page. 5. Create a AQH ID. This will be your AQH login ID and cannot be changed, so think of one that is secure and easy to remember. 6. Create a AQH password. You can change your password at any time. 7. Enter your Password Reset Question and Answer. This can be used at a later time if you forget your password. 8. Enter your e-mail address. You will receive e-mail notification when new information is available in [address] 9. Click Sign Up. You can now view and download portions of your medical record. 10. Click the Download Summary menu link to download a portable copy of your medical information. If you have questions, please visit the Frequently Asked Questions section of the AQH website. Remember, AQH is NOT to be used for urgent needs. For medical emergencies, dial 911. Now available from your iPhone and Android! Please provide this summary of care documentation to your next provider. Your primary care clinician is listed as Rishabh. If you have any questions after today's visit, please call [phone].

## 2018-01-08 ENCOUNTER — OFFICE VISIT (OUTPATIENT)
Dept: INTERNAL MEDICINE CLINIC | Age: 73
End: 2018-01-08

## 2018-01-08 VITALS
SYSTOLIC BLOOD PRESSURE: 110 MMHG | OXYGEN SATURATION: 98 % | HEIGHT: 64 IN | DIASTOLIC BLOOD PRESSURE: 76 MMHG | TEMPERATURE: 98.8 F | BODY MASS INDEX: 33.43 KG/M2 | HEART RATE: 90 BPM | RESPIRATION RATE: 24 BRPM | WEIGHT: 195.8 LBS

## 2018-01-08 DIAGNOSIS — J20.9 ACUTE BRONCHITIS, UNSPECIFIED ORGANISM: ICD-10-CM

## 2018-01-08 DIAGNOSIS — R50.9 FEVER, UNSPECIFIED FEVER CAUSE: Primary | ICD-10-CM

## 2018-01-08 DIAGNOSIS — R05.9 COUGH: ICD-10-CM

## 2018-01-08 LAB
FLUAV+FLUBV AG NOSE QL IA.RAPID: NEGATIVE POS/NEG
FLUAV+FLUBV AG NOSE QL IA.RAPID: NEGATIVE POS/NEG
VALID INTERNAL CONTROL?: YES

## 2018-01-08 RX ORDER — BENZONATATE 200 MG/1
200 CAPSULE ORAL
Qty: 30 CAP | Refills: 0 | Status: SHIPPED | OUTPATIENT
Start: 2018-01-08 | End: 2018-01-23 | Stop reason: SDUPTHER

## 2018-01-08 RX ORDER — CEFUROXIME AXETIL 500 MG/1
500 TABLET ORAL 2 TIMES DAILY
Qty: 20 TAB | Refills: 0 | Status: SHIPPED | OUTPATIENT
Start: 2018-01-08 | End: 2018-04-09 | Stop reason: ALTCHOICE

## 2018-01-08 NOTE — PROGRESS NOTES
1. Have you been to the ER, urgent care clinic since your last visit? Hospitalized since your last visit? No    2. Have you seen or consulted any other health care providers outside of the [address] [place] [address] since your last visit? Include any pap smears or colon screening. No     Chief Complaint   Patient presents with    Cough     cough, congestion, fever, body aches       Not fasting.

## 2018-01-08 NOTE — PROGRESS NOTES
Subjective:   Galilea Figueroa is a 81 y.o. female      Chief Complaint   Patient presents with    Cough     cough, congestion, fever, body aches        History of present illness: She presents continue with a lot of cough and congestion is gone for several days does not seem to clear up at all. She does have some body aches but no fevers or been noted she has had no shaking chills and no other complaints other than the cough and congestion. Patient Active Problem List   Diagnosis Code    Diabetes ([facility] E11.9    Allergic rhinitis J30.9    Obesity E66.9    Rosacea L71.9    Depression F32.9    Hyperlipidemia E78.5    Anxiety F41.9    Pneumonia J18.9    GI bleed K92.2    Overactive bladder N32.81    [facility]a [facility] M35.3    On statin therapy Z79.899    IBS (irritable bowel syndrome) K58.9    Hypertension with renal disease I12.9    DJD (degenerative joint disease), multiple sites M15.9    CKD (chronic kidney disease), stage II N18.2    Avascular necrosis of hip (HCC) M87.059    Abnormal LFTs R79.89    Annual physical exam Z00.00    Vitamin D deficiency E55.9    Recurrent depression (HCC) F33.9    Acute non-recurrent maxillary sinusitis J01.00    Fever R50.9    Cough R05    Acute bronchitis J20.9      Past Medical History:   Diagnosis Date    Abnormal LFTs 8/24/2017    Arthritis     OSTEO    Avascular necrosis of hip (HCC) 8/24/2017    Breast CA (HCC)     BILATERAL    Chronic pain     CKD (chronic kidney disease), stage II 8/24/2017    Coagulation disorder ([facility] 1984    ITP    Depression     Diabetes (HCC)     TYPE 2; NIDDM    DJD (degenerative joint disease), multiple sites 8/24/2017    GI bleed 8/24/2017    Hyperlipemia     Hypertension     Hypertension with renal disease 8/24/2017    IBS (irritable bowel syndrome) 8/24/2017    Myalgia 8/24/2017    On statin therapy 8/24/2017    Overactive bladder 8/24/2017    Pneumonia 04/2015    HOSPITALIZED 3 WEEKS.  Polymyalgia rheumatica (HCC) 8/24/2017    Prophylactic antibiotic 8/24/2017    Rosacea       Allergies   Allergen Reactions    Statins-Hmg-Coa Reductase Inhibitors Myalgia     Myalgia with pravachol and multiple statins    Codeine Rash     Rash on thighs    Darvon [Propoxyphene] Other (comments)     \"I see worms\"    Pcn [Penicillins] Rash    Sulfa (Sulfonamide Antibiotics) Rash      Family History   Problem Relation Age of Onset    Heart Disease Mother     Kidney Disease Mother     Lung Disease Mother      COPD    Anesth Problems Neg Hx       Social History     Social History    Marital status:      Spouse name: N/A    Number of children: N/A    Years of education: N/A     Occupational History    Not on file. Social History Main Topics    Smoking status: Never Smoker    Smokeless tobacco: Never Used    Alcohol use No    Drug use: No    Sexual activity: No     Other Topics Concern    Not on file     Social History Narrative     Prior to Admission medications    Medication Sig Start Date End Date Taking? Authorizing Provider   cefUROXime (CEFTIN) 500 mg tablet Take 1 Tab by mouth two (2) times a day. 1/8/18  Yes Miguel Crespo MD   benzonatate (TESSALON) 200 mg capsule Take 1 Cap by mouth three (3) times daily as needed for Cough for up to 7 days. 1/8/18 1/15/18 Yes Miguel Crespo MD   azithromycin (ZITHROMAX) 250 mg tablet Take 1 Tab by mouth See Admin Instructions for 5 days. Take 2 tablets the first day, then 1 tablet daily for the next four days. 1/5/18 1/10/18 Yes Miguel Crespo MD   hydroCHLOROthiazide (HYDRODIURIL) 25 mg tablet TAKE 1 TABLET BY MOUTH DAILY 10/31/17  Yes Miguel Crespo MD   buPROPion XL (WELLBUTRIN XL) 150 mg tablet TAKE 1 TABLET BY MOUTH EVERY DAY 10/26/17  Yes Miguel Crespo MD   albuterol (VENTOLIN HFA) 90 mcg/actuation inhaler Take  by inhalation.    Yes Historical Provider   levalbuterol (XOPENEX) 1.25 mg/0.5 mL nebu 1.25 mg by Nebulization route. Yes Historical Provider   loratadine-pseudoephedrine (CLARITIN-D 12 HOUR) 5-120 mg per tablet Take 1 Tab by mouth two (2) times a day. Yes Historical Provider   pravastatin (PRAVACHOL) 40 mg tablet Take 40 mg by mouth nightly. Yes Historical Provider   GLUCOSAMINE/MSM/CHONDROITIN A (GLUCOSAMINE HCL-MSM-CHONDROITN PO) Take  by mouth. Yes Historical Provider   multivitamin (ONE A DAY) tablet Take 1 Tab by mouth daily. Yes Historical Provider   SHARK CARTILAGE PO Take  by mouth. Yes Historical Provider   mometasone (ELOCON) 0.1 % topical cream Apply  to affected area daily. Yes Historical Provider   azelaic acid (FINACEA) 15 % topical gel Apply  to affected area two (2) times a day. Yes Historical Provider   diclofenac EC (VOLTAREN) 75 mg EC tablet Take 1 Tab by mouth two (2) times a day. 7/31/17  Yes Etta Alfaro MD   aspirin delayed-release 325 mg tablet Take 1 Tab by mouth two (2) times a day. Patient taking differently: Take 325 mg by mouth daily as needed. 12/29/16  Yes Carlos Brooks MD   metFORMIN ER (GLUCOPHAGE XR) 500 mg tablet Take 500 mg by mouth two (2) times a day. Yes Historical Provider   sertraline (ZOLOFT) 100 mg tablet Take 100 mg by mouth daily. Yes Historical Provider   carvedilol (COREG) 6.25 mg tablet Take 3.125 mg by mouth two (2) times daily (with meals). Yes Historical Provider   clindamycin (CLINDAGEL) 1 % topical gel Apply  to affected area two (2) times a day. use thin film on affected area   Yes Historical Provider        Review of Systems              Constitutional:  She denies fever, weight loss, sweats or fatigue. EYES: No blurred or double vision,               ENT: Some head and nasal congestion, no headache or dizziness. No difficulty with               swallowing, taste, speech or smell. Respiratory: Cough and congestion without wheezing or shortness of breath. No sputum production.   Cardiac:  Denies chest pain, palpitations, unexplained indigestion, syncope, edema, PND or orthopnea. GI:  No changes in bowel movements, no abdominal pain, no bloating, anorexia, nausea, vomiting or heartburn. :  No frequency or dysuria. Denies incontinence or sexual dysfunction. Extremities:  No joint pain, stiffness or swelling  Back:.no pain or soreness  Skin:  No recent rashes or mole changes. Neurological:  No numbness, tingling, burning paresthesias or loss of motor strength. No syncope, dizziness, frequent headaches or memory loss. Hematologic:  No easy bruising  Lymphatic: No lymph node enlargement    Objective:     Vitals:    01/08/18 1525   BP: 110/76   Pulse: 90   Resp: 24   Temp: 98.8 °F (37.1 °C)   TempSrc: Oral   SpO2: 98%   Weight: 195 lb 12.8 oz (88.8 kg)   Height: 5' 4\" (1.626 m)   PainSc:   2   PainLoc: Shoulder       Body mass index is 33.61 kg/(m^2). Physical Examination:              General Appearance:  Well-developed, well-nourished, no acute distress. HEENT:      Ears:  The TMs and ear canals were clear. Eyes:  The pupillary responses were normal.  Extraocular muscle function intact. Lids and conjunctiva not injected. Funduscopic exam revealed sharp disc margins. Nares: Clear w/o edema or erythema  Pharynx:  Clear with teeth in good repair. No masses were noted. Neck:  Supple without thyromegaly or adenopathy. No JVD noted. No carotid                bruits. Lungs: Scattered coarse rhonchi without wheezes  Cardiac:  Regular rate and rhythm without murmur. PMI not displaced. No gallop, rub or click. Abdominal: Soft, non-tender, no hepata-spleenomegally or masses  Extremities:  No clubbing, cyanosis or edema. Skin:  No rash or unusual mole changes noted. Lymph Nodes:  None felt in the cervical, supraclavicular, axillary or inguinal region. Neurological: . DTRs 2+ and symmetric.   Station and gait normal.   Hematologic:   No purpura or petechiae        Assessment/Plan: 1. Fever, unspecified fever cause    2. Cough    3. Acute bronchitis, unspecified organism        Impressions/Plan:  Chest x-ray obtained is clear and rapid influenza a and B are both negative. I think we are dealing with an acute bronchitis I reviewed her prior records from 2015 when she had a similar episode and it finally cleared with Ceftin with him placed on Ceftin 500 twice daily for 10 days and also give her Tessalon for cough we will recheck her if not resolved with the above treatment. Orders Placed This Encounter    XR CHEST PA LAT    AMB POC LAKSHMI INFLUENZA A/B TEST    cefUROXime (CEFTIN) 500 mg tablet    benzonatate (TESSALON) 200 mg capsule       Follow-up Disposition:  Return if symptoms worsen or fail to improve. Results for orders placed or performed in visit on 01/08/18   XR CHEST PA LAT    Narrative    INDICATION:  Cough. COMPARISON:  12/29/2016. FINDINGS:  PA and lateral views were obtained of the chest.    Surgical clips from the breast reconstruction are seen. The heart is normal in size. Right hilar calcified lymph nodes are noted. No consolidation, pulmonary edema, or pleural effusion is evident. Degenerative change of the spine and right shoulder is noted. Impression    IMPRESSION:    No evidence of pneumonia. Results for orders placed or performed in visit on 01/08/18   AMB POC LAKSHMI INFLUENZA A/B TEST   Result Value Ref Range    VALID INTERNAL CONTROL POC Yes     Influenza A Ag POC Negative Negative Pos/Neg    Influenza B Ag POC Negative Negative Pos/Neg         Marina Renee MD    The patient was given after the visit summary the patient verbalized an understanding of the plans and problems as explained.

## 2018-01-08 NOTE — MR AVS SNAPSHOT
Visit Information Date & Time Provider Department Dept. Phone Encounter #  
 1/8/2018  2:30 PM Eva Wilson, [address] [facility] [phone] [id number] Follow-up Instructions Return if symptoms worsen or fail to improve. Your Appointments 1/15/2018  9:50 AM  
FOLLOW UP 10 with MD VENUS Damian [facility] ([facility] Appt Note: [address] [id number] [address] [address] Upcoming Health Maintenance Date Due DTaP/Tdap/Td series (1 - Tdap) 1/15/1968 FOBT Q 1 YEAR AGE 50-75 1/15/1997 ZOSTER VACCINE AGE 60> 11/15/2006 MEDICARE YEARLY EXAM 1/15/2012 HEMOGLOBIN A1C Q6M 4/2/2018 EYE EXAM RETINAL OR DILATED Q1 5/15/2018 FOOT EXAM Q1 10/2/2018 MICROALBUMIN Q1 10/2/2018 LIPID PANEL Q1 10/2/2018 GLAUCOMA SCREENING Q2Y 5/15/2019 BREAST CANCER SCRN MAMMOGRAM 10/2/2019 Allergies as of 1/8/2018  Review Complete On: 1/8/2018 By: Eva Wilson MD  
  
 Severity Noted Reaction Type Reactions Statins-hmg-coa Reductase Inhibitors High 11/20/2016    Myalgia Myalgia with pravachol and multiple statins Codeine  04/20/2015    Rash Rash on thighs Darvon [Propoxyphene]  04/20/2015    Other (comments) \"I see worms\" Pcn [Penicillins]  04/20/2015    Rash  
 Sulfa (Sulfonamide Antibiotics)  04/20/2015    Rash Current Immunizations  Reviewed on 5/6/2015 Name Date Influenza High Dose Vaccine PF 10/2/2017 Influenza Vaccine 11/2/2015 Pneumococcal Conjugate (PCV-13) 7/27/2015 Pneumococcal Vaccine (Unspecified Type) 1/1/2013, 12/23/2011 Not reviewed this visit You Were Diagnosed With   
  
 Codes Comments Fever, unspecified fever cause    -  Primary ICD-10-CM: R50.9 ICD-9-CM: 780.60 Cough     ICD-10-CM: R05 ICD-9-CM: 786.2 Acute bronchitis, unspecified organism     ICD-10-CM: J20.9 ICD-9-CM: 466.0 Vitals BP Pulse Temp Resp Height(growth percentile) Weight(growth percentile) 110/76 (BP 1 Location: Left arm, BP Patient Position: Sitting) 90 98.8 °F (37.1 °C) (Oral) 24 5' 4\" (1.626 m) 195 lb 12.8 oz (88.8 kg) SpO2 BMI OB Status Smoking Status 98% 33.61 kg/m2 Hysterectomy Never Smoker Vitals History BMI and BSA Data Body Mass Index Body Surface Area  
 33.61 kg/m 2 2 m 2 Preferred Pharmacy Pharmacy Name Phone [facility] [phone] Your Updated Medication List  
  
   
This list is accurate as of: 1/8/18  4:15 PM.  Always use your most recent med list.  
  
  
  
  
 aspirin delayed-release 325 mg tablet Take 1 Tab by mouth two (2) times a day. azithromycin 250 mg tablet Commonly known as:  Nelda Martinis Take 1 Tab by mouth See Admin Instructions for 5 days. Take 2 tablets the first day, then 1 tablet daily for the next four days. benzonatate 200 mg capsule Commonly known as:  TESSALON Take 1 Cap by mouth three (3) times daily as needed for Cough for up to 7 days. buPROPion  mg tablet Commonly known as:  WELLBUTRIN XL  
TAKE 1 TABLET BY MOUTH EVERY DAY  
  
 cefUROXime 500 mg tablet Commonly known as:  CEFTIN Take 1 Tab by mouth two (2) times a day. CLARITIN-D 12 HOUR 5-120 mg per tablet Generic drug:  loratadine-pseudoephedrine Take 1 Tab by mouth two (2) times a day. clindamycin 1 % topical gel Commonly known as:  CLINDAGEL Apply  to affected area two (2) times a day. use thin film on affected area COREG 6.25 mg tablet Generic drug:  carvedilol Take 3.125 mg by mouth two (2) times daily (with meals). diclofenac EC 75 mg EC tablet Commonly known as:  VOLTAREN Take 1 Tab by mouth two (2) times a day.   
  
 ELOCON 0.1 % topical cream  
 Generic drug:  mometasone Apply  to affected area daily. FINACEA 15 % topical gel Generic drug:  azelaic acid Apply  to affected area two (2) times a day. GLUCOSAMINE HCL-MSM-CHONDROITN PO Take  by mouth. hydroCHLOROthiazide 25 mg tablet Commonly known as:  HYDRODIURIL  
TAKE 1 TABLET BY MOUTH DAILY  
  
 levalbuterol 1.25 mg/0.5 mL Nebu Commonly known as:  XOPENEX  
1.25 mg by Nebulization route. metFORMIN  mg tablet Commonly known as:  GLUCOPHAGE XR Take 500 mg by mouth two (2) times a day. multivitamin tablet Commonly known as:  ONE A DAY Take 1 Tab by mouth daily. pravastatin 40 mg tablet Commonly known as:  PRAVACHOL Take 40 mg by mouth nightly. sertraline 100 mg tablet Commonly known as:  ZOLOFT Take 100 mg by mouth daily. SHARK CARTILAGE PO Take  by mouth. VENTOLIN HFA 90 mcg/actuation inhaler Generic drug:  albuterol Take  by inhalation. Prescriptions Sent to Pharmacy Refills  
 cefUROXime (CEFTIN) 500 mg tablet 0 Sig: Take 1 Tab by mouth two (2) times a day. Class: Normal  
 [facility]acy: [facility]  Ph #: [phone] Route: Oral  
 benzonatate (TESSALON) 200 mg capsule 0 Sig: Take 1 Cap by mouth three (3) times daily as needed for Cough for up to 7 days. Class: Normal  
 [facility]acy: [facility]  Ph #: [phone] Route: Oral  
  
We Performed the Following AMB POC LAKSHMI INFLUENZA A/B TEST [58826 CPT(R)] Follow-up Instructions Return if symptoms worsen or fail to improve. To-Do List   
 01/08/2018 Imaging:  XR CHEST PA LAT Introducing [facility]! Akin Sidhu introduces [employer] patient portal. Now you can access parts of your medical record, email your doctor's office, and request medication refills online. 1. In your internet browser, go to [email or web address] 2. Click on the First Time User? Click Here link in the Sign In box. You will see the New Member Sign Up page. 3. Enter your Subimage Access Code exactly as it appears below. You will not need to use this code after youve completed the sign-up process. If you do not sign up before the expiration date, you must request a new code. · Subimage Access Code: [email or web address] Expires: 4/5/2018  9:12 AM 
 
4. Enter the last four digits of your Social Security Number (xxxx) and Date of Birth (mm/dd/yyyy) as indicated and click Submit. You will be taken to the next sign-up page. 5. Create a BeMyGuestt ID. This will be your Subimage login ID and cannot be changed, so think of one that is secure and easy to remember. 6. Create a Subimage password. You can change your password at any time. 7. Enter your Password Reset Question and Answer. This can be used at a later time if you forget your password. 8. Enter your e-mail address. You will receive e-mail notification when new information is available in [address] 9. Click Sign Up. You can now view and download portions of your medical record. 10. Click the Download Summary menu link to download a portable copy of your medical information. If you have questions, please visit the Frequently Asked Questions section of the Subimage website. Remember, Subimage is NOT to be used for urgent needs. For medical emergencies, dial 911. Now available from your iPhone and Android! Please provide this summary of care documentation to your next provider. Your primary care clinician is listed as Rishabh. If you have any questions after today's visit, please call [phone].

## 2018-01-12 PROBLEM — M15.9 PRIMARY OSTEOARTHRITIS INVOLVING MULTIPLE JOINTS: Status: ACTIVE | Noted: 2018-01-12

## 2018-01-15 ENCOUNTER — OFFICE VISIT (OUTPATIENT)
Dept: INTERNAL MEDICINE CLINIC | Age: 73
End: 2018-01-15

## 2018-01-15 VITALS
DIASTOLIC BLOOD PRESSURE: 82 MMHG | TEMPERATURE: 98.4 F | WEIGHT: 190 LBS | SYSTOLIC BLOOD PRESSURE: 110 MMHG | HEIGHT: 64 IN | BODY MASS INDEX: 32.44 KG/M2 | OXYGEN SATURATION: 97 % | RESPIRATION RATE: 18 BRPM | HEART RATE: 84 BPM

## 2018-01-15 DIAGNOSIS — E11.22 CONTROLLED TYPE 2 DIABETES MELLITUS WITH STAGE 2 CHRONIC KIDNEY DISEASE, WITH LONG-TERM CURRENT USE OF INSULIN (HCC): ICD-10-CM

## 2018-01-15 DIAGNOSIS — Z00.00 MEDICARE ANNUAL WELLNESS VISIT, INITIAL: ICD-10-CM

## 2018-01-15 DIAGNOSIS — I12.9 HYPERTENSION WITH RENAL DISEASE: Primary | ICD-10-CM

## 2018-01-15 DIAGNOSIS — M35.3 POLYMYALGIA RHEUMATICA (HCC): ICD-10-CM

## 2018-01-15 DIAGNOSIS — Z79.4 CONTROLLED TYPE 2 DIABETES MELLITUS WITH STAGE 2 CHRONIC KIDNEY DISEASE, WITH LONG-TERM CURRENT USE OF INSULIN (HCC): ICD-10-CM

## 2018-01-15 DIAGNOSIS — M15.9 PRIMARY OSTEOARTHRITIS INVOLVING MULTIPLE JOINTS: ICD-10-CM

## 2018-01-15 DIAGNOSIS — E78.2 MIXED HYPERLIPIDEMIA: ICD-10-CM

## 2018-01-15 DIAGNOSIS — N18.2 CONTROLLED TYPE 2 DIABETES MELLITUS WITH STAGE 2 CHRONIC KIDNEY DISEASE, WITH LONG-TERM CURRENT USE OF INSULIN (HCC): ICD-10-CM

## 2018-01-15 DIAGNOSIS — N18.2 CKD (CHRONIC KIDNEY DISEASE), STAGE II: ICD-10-CM

## 2018-01-15 DIAGNOSIS — M87.059 AVASCULAR NECROSIS OF BONE OF HIP, UNSPECIFIED LATERALITY (HCC): ICD-10-CM

## 2018-01-15 DIAGNOSIS — F33.9 RECURRENT DEPRESSION (HCC): ICD-10-CM

## 2018-01-15 LAB
ALBUMIN SERPL-MCNC: 4.8 G/DL (ref 3.9–5.4)
ALKALINE PHOS POC: 92 U/L (ref 38–126)
ALT SERPL-CCNC: 100 U/L (ref 9–52)
AST SERPL-CCNC: 71 U/L (ref 14–36)
BUN BLD-MCNC: 25 MG/DL (ref 7–17)
CALCIUM BLD-MCNC: 10.1 MG/DL (ref 8.4–10.2)
CHLORIDE BLD-SCNC: 101 MMOL/L (ref 98–107)
CHOLEST SERPL-MCNC: 246 MG/DL (ref 0–200)
CK (CPK) POC: 80 U/L (ref 30–135)
CO2 POC: 28 MMOL/L (ref 22–32)
CREAT BLD-MCNC: 0.9 MG/DL (ref 0.7–1.2)
EGFR (POC): 64.3
GLUCOSE POC: 162 MG/DL (ref 65–105)
HBA1C MFR BLD HPLC: 6.9 % (ref 4.5–5.7)
HDLC SERPL-MCNC: 44 MG/DL (ref 35–130)
LDL CHOLESTEROL POC: 158.2 MG/DL (ref 0–130)
POTASSIUM SERPL-SCNC: 4 MMOL/L (ref 3.6–5)
PROT SERPL-MCNC: 8.2 G/DL (ref 6.3–8.2)
SODIUM SERPL-SCNC: 142 MMOL/L (ref 137–145)
TCHOL/HDL RATIO (POC): 5.6 (ref 0–4)
TOTAL BILIRUBIN POC: 0.9 MG/DL (ref 0.2–1.3)
TRIGL SERPL-MCNC: 219 MG/DL (ref 0–200)
VLDLC SERPL CALC-MCNC: 43.8 MG/DL

## 2018-01-15 NOTE — PATIENT INSTRUCTIONS
Arthritis: Care Instructions  Your Care Instructions  Arthritis, also called osteoarthritis, is a breakdown of the cartilage that cushions your joints. When the cartilage wears down, your bones rub against each other. This causes pain and stiffness. Many people have some arthritis as they age. Arthritis most often affects the joints of the spine, hands, hips, knees, or feet. You can take simple measures to protect your joints, ease your pain, and help you stay active. Follow-up care is a key part of your treatment and safety. Be sure to make and go to all appointments, and call your doctor if you are having problems. It's also a good idea to know your test results and keep a list of the medicines you take. How can you care for yourself at home? · Stay at a healthy weight. Being overweight puts extra strain on your joints. · Talk to your doctor or physical therapist about exercises that will help ease joint pain. ¨ Stretch. You may enjoy gentle forms of yoga to help keep your joints and muscles flexible. ¨ Walk instead of jog. Other types of exercise that are less stressful on the joints include riding a bicycle, swimming, bebo chi, or water exercise. ¨ Lift weights. Strong muscles help reduce stress on your joints. Stronger thigh muscles, for example, take some of the stress off of the knees and hips. Learn the right way to lift weights so you do not make joint pain worse. · Take your medicines exactly as prescribed. Call your doctor if you think you are having a problem with your medicine. · Take pain medicines exactly as directed. ¨ If the doctor gave you a prescription medicine for pain, take it as prescribed. ¨ If you are not taking a prescription pain medicine, ask your doctor if you can take an over-the-counter medicine. · Use a cane, crutch, walker, or another device if you need help to get around. These can help rest your joints.  You also can use other things to make life easier, such as a higher toilet seat and padded handles on kitchen utensils. · Do not sit in low chairs, which can make it hard to get up. · Put heat or cold on your sore joints as needed. Use whichever helps you most. You also can take turns with hot and cold packs. ¨ Apply heat 2 or 3 times a day for 20 to 30 minutes-using a heating pad, hot shower, or hot pack-to relieve pain and stiffness. ¨ Put ice or a cold pack on your sore joint for 10 to 20 minutes at a time. Put a thin cloth between the ice and your skin. When should you call for help? Call your doctor now or seek immediate medical care if:  ? · You have sudden swelling, warmth, or pain in any joint. ? · You have joint pain and a fever or rash. ? · You have such bad pain that you cannot use a joint. ? Watch closely for changes in your health, and be sure to contact your doctor if:  ? · You have mild joint symptoms that continue even with more than 6 weeks of care at home. ? · You have stomach pain or other problems with your medicine. Where can you learn more? Go to http://malia-lincoln.info/. Enter Y926 in the search box to learn more about \"Arthritis: Care Instructions. \"  Current as of: October 31, 2016  Content Version: 11.4  © 6616-1626 [employer]. Care instructions adapted under license by [employer] (which disclaims liability or warranty for this information). If you have questions about a medical condition or this instruction, always ask your healthcare professional. [place] [address] any warranty or liability for your use of this information.

## 2018-01-15 NOTE — PROGRESS NOTES
This is an Initial Medicare Annual Wellness Exam (AWV) (Performed 12 months after IPPE or effective date of Medicare Part B enrollment, Once in a lifetime)    I have reviewed the patient's medical history in detail and updated the computerized patient record. She presents today for initial Medicare annual wellness examination screening questionnaire. She is also here in follow-up of her medical problems include hypertension, diabetes, hyperlipidemia, chronic kidney disease, DJD, IBS, history of polymyalgia rheumatica, history of avascular necrosis or her hip status post replacement, and history of depression which is currently controlled. She currently denies any chest pain, shortness of breath or cardiorespiratory complaints. She denies any GI or  complaints. She has no headaches or neurologic complaints. There are no other complaints on complete review of systems. She is trying her best to follow her diet get some exercise and get her weight down. She recently had a respiratory infection for which she is continuing and completing course of Ceftin that seems to be resolved. History     Past Medical History:   Diagnosis Date    Abnormal LFTs 8/24/2017    Arthritis     OSTEO    Avascular necrosis of hip ([address] 8/24/2017    Breast CA (HCC)     BILATERAL    Chronic pain     CKD (chronic kidney disease), stage II 8/24/2017    Coagulation disorder ([address] 1984    ITP    Depression     Diabetes ([address]     TYPE 2; NIDDM    DJD (degenerative joint disease), multiple sites 8/24/2017    GI bleed 8/24/2017    Hyperlipemia     Hypertension     Hypertension with renal disease 8/24/2017    IBS (irritable bowel syndrome) 8/24/2017    Myalgia 8/24/2017    On statin therapy 8/24/2017    Overactive bladder 8/24/2017    Pneumonia 04/2015    HOSPITALIZED 3 WEEKS.     Polymyalgia rheumatica ([address] 8/24/2017    Prophylactic antibiotic 8/24/2017    Rosacea       Past Surgical History:   Procedure Laterality Date    BREAST SURGERY PROCEDURE UNLISTED      RECONSTRUCTION X2    HX APPENDECTOMY  1950    HX CATARACT REMOVAL Bilateral     W /IOL    HX GI      COLONOSCOPY    HX HEENT      WISDOM TEETH    HX HYSTERECTOMY  2000S    HX MASTECTOMY  1989    bilateral    HX MASTECTOMY  2001    HX ORTHOPAEDIC      back fusion 2008-LUMBAR    HX TUBAL LIGATION  1981    TOTAL HIP ARTHROPLASTY Left 11/16/2016    ANTERIOR APPROACH, DR Gwendolyn De La Torre; (POSTOP: STANDS ONE INCH TALLER ON LEFT FOOT)     Current Outpatient Prescriptions   Medication Sig Dispense Refill    cefUROXime (CEFTIN) 500 mg tablet Take 1 Tab by mouth two (2) times a day. 20 Tab 0    benzonatate (TESSALON) 200 mg capsule Take 1 Cap by mouth three (3) times daily as needed for Cough for up to 7 days. 30 Cap 0    hydroCHLOROthiazide (HYDRODIURIL) 25 mg tablet TAKE 1 TABLET BY MOUTH DAILY 90 Tab 3    buPROPion XL (WELLBUTRIN XL) 150 mg tablet TAKE 1 TABLET BY MOUTH EVERY DAY 90 Tab 0    albuterol (VENTOLIN HFA) 90 mcg/actuation inhaler Take  by inhalation.  levalbuterol (XOPENEX) 1.25 mg/0.5 mL nebu 1.25 mg by Nebulization route.  loratadine-pseudoephedrine (CLARITIN-D 12 HOUR) 5-120 mg per tablet Take 1 Tab by mouth two (2) times a day.  pravastatin (PRAVACHOL) 40 mg tablet Take 40 mg by mouth nightly.  GLUCOSAMINE/MSM/CHONDROITIN A (GLUCOSAMINE HCL-MSM-CHONDROITN PO) Take  by mouth.  multivitamin (ONE A DAY) tablet Take 1 Tab by mouth daily.  SHARK CARTILAGE PO Take  by mouth.  mometasone (ELOCON) 0.1 % topical cream Apply  to affected area daily.  azelaic acid (FINACEA) 15 % topical gel Apply  to affected area two (2) times a day.  diclofenac EC (VOLTAREN) 75 mg EC tablet Take 1 Tab by mouth two (2) times a day. 60 Tab 11    aspirin delayed-release 325 mg tablet Take 1 Tab by mouth two (2) times a day.  (Patient taking differently: Take 325 mg by mouth daily as needed.) 60 Tab 0    metFORMIN ER (GLUCOPHAGE XR) 500 mg tablet Take 500 mg by mouth two (2) times a day.  sertraline (ZOLOFT) 100 mg tablet Take 100 mg by mouth daily.  carvedilol (COREG) 6.25 mg tablet Take 3.125 mg by mouth two (2) times daily (with meals).  clindamycin (CLINDAGEL) 1 % topical gel Apply  to affected area two (2) times a day.  use thin film on affected area       Allergies   Allergen Reactions    Statins-Hmg-Coa Reductase Inhibitors Myalgia     Myalgia with pravachol and multiple statins    Codeine Rash     Rash on thighs    Darvon [Propoxyphene] Other (comments)     \"I see worms\"    Pcn [Penicillins] Rash    Sulfa (Sulfonamide Antibiotics) Rash     Family History   Problem Relation Age of Onset    Heart Disease Mother     Kidney Disease Mother     Lung Disease Mother      COPD    Anesth Problems Neg Hx      Social History   Substance Use Topics    Smoking status: Never Smoker    Smokeless tobacco: Never Used    Alcohol use No     Patient Active Problem List   Diagnosis Code    Controlled type 2 diabetes mellitus with stage 2 chronic kidney disease, with long-term current use of insulin ([facility]) E11.22, N18.2, Z79.4    Allergic rhinitis J30.9    Class 1 obesity due to excess calories without serious comorbidity with body mass index (BMI) of 33.0 to 33.9 in adult E66.09, Z68.33    Rosacea L71.9    Mixed hyperlipidemia E78.2    Anxiety F41.9    GI bleed K92.2    Overactive bladder N32.81    Polymyalgia rheumatica ([facility]) M35.3    On statin therapy Z79.899    IBS (irritable bowel syndrome) K58.9    Hypertension with renal disease I12.9    CKD (chronic kidney disease), stage II N18.2    Avascular necrosis of hip ([facility]) M87.059    Abnormal LFTs R79.89    Annual physical exam Z00.00    Vitamin D deficiency E55.9    Recurrent depression ([facility]) F33.9    Primary osteoarthritis involving multiple joints M15.0    Medicare annual wellness visit, initial Z00.00       Depression Risk Factor Screening:   No flowsheet data found. Alcohol Risk Factor Screening: You do not drink alcohol or very rarely. Functional Ability and Level of Safety:     Hearing Loss  Hearing is good. Activities of Daily Living  The home contains: no safety equipment. Patient does total self care    Fall Risk  Fall Risk Assessment, last 12 mths 1/15/2018   Able to walk? Yes   Fall in past 12 months? No       Abuse Screen  Patient is not abused    Cognitive Screening   Evaluation of Cognitive Function:  Has your family/caregiver stated any concerns about your memory: no  Normal     ROS:    Constitutional: She denies fevers, weight loss, sweats. Eyes: No blurred or double vision. ENT: No difficulty with swallowing, taste, speech or smell. NECK: no stiffness swelling or lymph node enlargement  Respiratory: No cough wheezing or shortness of breath. Cardiovascular: Denies chest pain, palpitations, unexplained indigestion or syncope. Breast: She has noted no masses or lumps and no discharge or axillary swelling  Gastrointestinal:  No changes in bowel movements, no abdominal pain, no bloating. Genitourinary: No discharge or abnormal bleeding or pain  Extremities: No joint pain, stiffness or swelling. Neurological:  No numbness, tingling, burring paresthesias or loss of motor strength. No syncope, dizziness or frequent headache  Skin:  No recent rashes or mole changes. Psychiatric/Behavioral:  Negative for depression. The patient is not nervous/anxious.    HEMATOLOGIC: no easy bruising or bleeding gums  Endocrine: no sweats of urinary frequency or excessive thirst    Vitals:    01/15/18 1034   BP: 110/82   Pulse: 84   Resp: 18   Temp: 98.4 °F (36.9 °C)   SpO2: 97%   Weight: 190 lb (86.2 kg)   Height: 5' 4\" (1.626 m)        PHYSICAL EXAM:    General appearance - alert, well appearing, and in no distress  Mental status - alert, oriented to person, place, and time  HEENT:  Ears - bilateral TM's and external ear canals clear  Eyes - pupillary responses were normal.  Extraocular muscle function intact. Lids and conjunctiva not injected. Fundoscopic exam revealed sharp disc margins. eye movements intact  Pharynx- clear with teeth in good repair. No masses were noted  Neck - supple without thyromegaly or burit. No JVD noted  Lungs - clear to auscultation and percussion  Cardiac- normal rate, regular rhythm without murmurs. PMI not displaced. No gallop, rub or click  Breast: deferred to GYN  Abdomen - flat, soft, non-tender without palpable organomegaly or mass. No pulsatile mass was felt, and not bruit was heard. Bowel sounds were active   Female - deferred to GYN  Rectal - deferred to GYN  Extremities -  no clubbing cyanosis or edema  Lymphatics - no palpable lymphadenopathy, no hepatosplenomegaly  Peripheral vascular - Dorsalis pedis and posterior tibial pulses felt without difficulty  Skin - no rash or unusual mole change noted  Neurological - Cranial nerves II-XII grossly intact. Motor strength 5/5. DTR's 2+ and symmetric. Station and gait normal  Back exam - full range of motion, no tenderness, palpable spasm or pain on motion  Musculoskeletal - no joint tenderness, deformity or swelling  Hematologic: no purpura, petechiae or bruising    Patient Care Team   Patient Care Team:  Delio Garcia MD as PCP - General (Internal Medicine)    Assessment/Plan   Education and counseling provided:  Are appropriate based on today's review and evaluation    ASSESSMENT:   1. Hypertension with renal disease    2. Controlled type 2 diabetes mellitus with stage 2 chronic kidney disease, with long-term current use of insulin ([address]    3. Primary osteoarthritis involving multiple joints    4. CKD (chronic kidney disease), stage II    5. Mixed hyperlipidemia    6. Polymyalgia rheumatica ([address]    7. Recurrent depression ([address]    8. Avascular necrosis of bone of hip, unspecified laterality ([address]    9. Medicare annual wellness visit, initial      Impression  1. Hypertension that is very well controlled continue current therapy reviewed with her  2. Diabetes that status pending  and prior status reviewed and will make adjustments if necessary  3. Hyperlipidemia prior numbers reviewed repeat status pending will make adjustments if necessary  4. DJD that seems to be stable  5. CKD currently stable on last check repeat status pending  6. History of pulmonary dramatic is currently asymptomatic and on no current treatment  7. History depression she seems to be stable now and does not feel like that is an issue she is compliantly well controlled. 8.  Status post treatment of avascular necrosis with hip replacement she is doing well  Medicare annual wellness examination screening questionnaire completed. The results reviewed with her and her questions were answered. Lifestyle recommendations and modifications discussed and made. Labs are pending as noted will make further recommendation based on those. Follow stable continue same and follow-up scheduled again for 3 months or sooner should there be a problem. PLAN:  .  Orders Placed This Encounter    AMB POC LIPID PROFILE    AMB POC HEMOGLOBIN A1C    AMB POC COMPREHENSIVE METABOLIC PANEL    AMB POC CK (CPK)         ATTENTION:   This medical record was transcribed using an electronic medical records system. Although proofread, it may and can contain electronic and spelling errors. Other human spelling and other errors may be present. Corrections may be executed at a later time. Please feel free to contact us for any clarifications as needed. Follow-up Disposition:  Return in about 3 months (around 4/15/2018).       Lizbet Cummings MD     Health Maintenance Due   Topic Date Due    DTaP/Tdap/Td series (1 - Tdap) 01/15/1968    ZOSTER VACCINE AGE 60>  11/15/2006

## 2018-01-15 NOTE — MR AVS SNAPSHOT
Visit Information Date & Time Provider Department Dept. Phone Encounter #  
 1/15/2018  9:50 AM Julia Gonzalez MD [facility] [id number] Follow-up Instructions Return in about 3 months (around 4/15/2018). Follow-up and Disposition History Your Appointments 4/16/2018  9:50 AM  
FOLLOW UP 10 with MD ADRIANA Wade [facility] ([address]) Appt Note: [address] [facility] [address] [id number] [address] [address] Upcoming Health Maintenance Date Due DTaP/Tdap/Td series (1 - Tdap) 1/15/1968 ZOSTER VACCINE AGE 60> 11/15/2006 COLONOSCOPY 2/28/2016 HEMOGLOBIN A1C Q6M 4/2/2018 EYE EXAM RETINAL OR DILATED Q1 5/15/2018 FOOT EXAM Q1 10/2/2018 MICROALBUMIN Q1 10/2/2018 LIPID PANEL Q1 10/2/2018 MEDICARE YEARLY EXAM 1/16/2019 GLAUCOMA SCREENING Q2Y 5/15/2019 BREAST CANCER SCRN MAMMOGRAM 10/2/2019 Allergies as of 1/15/2018  Review Complete On: 1/15/2018 By: Julia Gonzalez MD  
  
 Severity Noted Reaction Type Reactions Statins-hmg-coa Reductase Inhibitors High 11/20/2016    Myalgia Myalgia with pravachol and multiple statins Codeine  04/20/2015    Rash Rash on thighs Darvon [Propoxyphene]  04/20/2015    Other (comments) \"I see worms\" Pcn [Penicillins]  04/20/2015    Rash  
 Sulfa (Sulfonamide Antibiotics)  04/20/2015    Rash Current Immunizations  Reviewed on 5/6/2015 Name Date Influenza High Dose Vaccine PF 10/2/2017 Influenza Vaccine 11/2/2015 Pneumococcal Conjugate (PCV-13) 7/27/2015 Pneumococcal Vaccine (Unspecified Type) 1/1/2013, 12/23/2011 Not reviewed this visit You Were Diagnosed With   
  
 Codes Comments Hypertension with renal disease    -  Primary ICD-10-CM: I12.9 ICD-9-CM: 403.90 Controlled type 2 diabetes mellitus with stage 2 chronic kidney disease, with long-term current use of insulin (HCC)     ICD-10-CM: E11.22, N18.2, Z79.4 ICD-9-CM: 250.40, 585.2, V58.67 Primary osteoarthritis involving multiple joints     ICD-10-CM: M15.0 ICD-9-CM: 715.09   
 CKD (chronic kidney disease), stage II     ICD-10-CM: N18.2 ICD-9-CM: 747. 2 Mixed hyperlipidemia     ICD-10-CM: E78.2 ICD-9-CM: 272.2 Polymyalgia rheumatica (HCC)     ICD-10-CM: M35.3 ICD-9-CM: 914 Recurrent depression ([facility]     ICD-10-CM: F33.9 ICD-9-CM: 296.30 Avascular necrosis of bone of hip, unspecified laterality ([facility]     ICD-10-CM: M87.059 ICD-9-CM: 733.42 Medicare annual wellness visit, initial     ICD-10-CM: Z00.00 ICD-9-CM: V70.0 Vitals BP Pulse Temp Resp Height(growth percentile) Weight(growth percentile) 110/82 (BP 1 Location: Right arm, BP Patient Position: Sitting) 84 98.4 °F (36.9 °C) 18 5' 4\" (1.626 m) 190 lb (86.2 kg) SpO2 BMI OB Status Smoking Status 97% 32.61 kg/m2 Hysterectomy Never Smoker BMI and BSA Data Body Mass Index Body Surface Area  
 32.61 kg/m 2 1.97 m 2 Preferred Pharmacy Pharmacy Name Phone [facility] [address] [phone] Your Updated Medication List  
  
   
This list is accurate as of: 1/15/18 11:36 AM.  Always use your most recent med list.  
  
  
  
  
 aspirin delayed-release 325 mg tablet Take 1 Tab by mouth two (2) times a day. benzonatate 200 mg capsule Commonly known as:  TESSALON Take 1 Cap by mouth three (3) times daily as needed for Cough for up to 7 days. buPROPion  mg tablet Commonly known as:  WELLBUTRIN XL  
TAKE 1 TABLET BY MOUTH EVERY DAY  
  
 cefUROXime 500 mg tablet Commonly known as:  CEFTIN Take 1 Tab by mouth two (2) times a day. CLARITIN-D 12 HOUR 5-120 mg per tablet Generic drug:  loratadine-pseudoephedrine Take 1 Tab by mouth two (2) times a day. clindamycin 1 % topical gel Commonly known as:  CLINDAGEL Apply  to affected area two (2) times a day. use thin film on affected area COREG 6.25 mg tablet Generic drug:  carvedilol Take 3.125 mg by mouth two (2) times daily (with meals). diclofenac EC 75 mg EC tablet Commonly known as:  VOLTAREN Take 1 Tab by mouth two (2) times a day. ELOCON 0.1 % topical cream  
Generic drug:  mometasone Apply  to affected area daily. FINACEA 15 % topical gel Generic drug:  azelaic acid Apply  to affected area two (2) times a day. GLUCOSAMINE HCL-MSM-CHONDROITN PO Take  by mouth. hydroCHLOROthiazide 25 mg tablet Commonly known as:  HYDRODIURIL  
TAKE 1 TABLET BY MOUTH DAILY  
  
 levalbuterol 1.25 mg/0.5 mL Nebu Commonly known as:  XOPENEX  
1.25 mg by Nebulization route. metFORMIN  mg tablet Commonly known as:  GLUCOPHAGE XR Take 500 mg by mouth two (2) times a day. multivitamin tablet Commonly known as:  ONE A DAY Take 1 Tab by mouth daily. pravastatin 40 mg tablet Commonly known as:  PRAVACHOL Take 40 mg by mouth nightly. sertraline 100 mg tablet Commonly known as:  ZOLOFT Take 100 mg by mouth daily. SHARK CARTILAGE PO Take  by mouth. VENTOLIN HFA 90 mcg/actuation inhaler Generic drug:  albuterol Take  by inhalation. We Performed the Following AMB POC CK (CPK) [72426 CPT(R)] AMB POC COMPREHENSIVE METABOLIC PANEL [29758 CPT(R)] AMB POC HEMOGLOBIN A1C [06382 CPT(R)] AMB POC LIPID PROFILE [21311 CPT(R)] Follow-up Instructions Return in about 3 months (around 4/15/2018). Patient Instructions Arthritis: Care Instructions Your Care Instructions Arthritis, also called osteoarthritis, is a breakdown of the cartilage that cushions your joints. When the cartilage wears down, your bones rub against each other. This causes pain and stiffness. Many people have some arthritis as they age. Arthritis most often affects the joints of the spine, hands, hips, knees, or feet. You can take simple measures to protect your joints, ease your pain, and help you stay active. Follow-up care is a key part of your treatment and safety. Be sure to make and go to all appointments, and call your doctor if you are having problems. It's also a good idea to know your test results and keep a list of the medicines you take. How can you care for yourself at home? · Stay at a healthy weight. Being overweight puts extra strain on your joints. · Talk to your doctor or physical therapist about exercises that will help ease joint pain. ¨ Stretch. You may enjoy gentle forms of yoga to help keep your joints and muscles flexible. ¨ Walk instead of jog. Other types of exercise that are less stressful on the joints include riding a bicycle, swimming, bebo chi, or water exercise. ¨ Lift weights. Strong muscles help reduce stress on your joints. Stronger thigh muscles, for example, take some of the stress off of the knees and hips. Learn the right way to lift weights so you do not make joint pain worse. · Take your medicines exactly as prescribed. Call your doctor if you think you are having a problem with your medicine. · Take pain medicines exactly as directed. ¨ If the doctor gave you a prescription medicine for pain, take it as prescribed. ¨ If you are not taking a prescription pain medicine, ask your doctor if you can take an over-the-counter medicine. · Use a cane, crutch, walker, or another device if you need help to get around. These can help rest your joints. You also can use other things to make life easier, such as a higher toilet seat and padded handles on kitchen utensils. · Do not sit in low chairs, which can make it hard to get up. · Put heat or cold on your sore joints as needed. Use whichever helps you most. You also can take turns with hot and cold packs. ¨ Apply heat 2 or 3 times a day for 20 to 30 minutes-using a heating pad, hot shower, or hot pack-to relieve pain and stiffness. ¨ Put ice or a cold pack on your sore joint for 10 to 20 minutes at a time. Put a thin cloth between the ice and your skin. When should you call for help? Call your doctor now or seek immediate medical care if: 
? · You have sudden swelling, warmth, or pain in any joint. ? · You have joint pain and a fever or rash. ? · You have such bad pain that you cannot use a joint. ? Watch closely for changes in your health, and be sure to contact your doctor if: 
? · You have mild joint symptoms that continue even with more than 6 weeks of care at home. ? · You have stomach pain or other problems with your medicine. Where can you learn more? Go to http://malia-lincoln.info/. Enter V545 in the search box to learn more about \"Arthritis: Care Instructions. \" Current as of: October 31, 2016 Content Version: 11.4 © 2929-4682 [employer]. Care instructions adapted under license by [employer] (which disclaims liability or warranty for this information). If you have questions about a medical condition or this instruction, always ask your healthcare professional. [place] [address] any warranty or liability for your use of this information. Patient Instructions History Introducing \[facility] [place] [facility]\"" & HEALTH SERVICES! [employer] introduces [facility] patient portal. Now you can access parts of your medical record, email your doctor's office, and request medication refills online. 1. In your internet browser, go to [email or web address] 2. Click on the First Time User? Click Here link in the Sign In box. You will see the New Member Sign Up page. 3. Enter your [facility] Access Code exactly as it appears below. You will not need to use this code after youve completed the sign-up process. If you do not sign up before the expiration date, you must request a new code. · [employer] [facility] Access Code: [email or web address] Expires: 4/5/2018  9:12 AM 
 
4. Enter the last four digits of your Social Security Number (xxxx) and Date of Birth (mm/dd/yyyy) as indicated and click Submit. You will be taken to the next sign-up page. 5. Create a [facility] ID. This will be your [facility] login ID and cannot be changed, so think of one that is secure and easy to remember. 6. Create a [employer] [facility] password. You can change your password at any time. 7. Enter your Password Reset Question and Answer. This can be used at a later time if you forget your password. 8. Enter your e-mail address. You will receive e-mail notification when new information is available in [address]. 9. Click Sign Up. You can now view and download portions of your medical record. 10. Click the Download Summary menu link to download a portable copy of your medical information. If you have questions, please visit the Frequently Asked Questions section of the [employer] website. Remember, [employer] is NOT to be used for urgent needs. For medical emergencies, dial 911. Now available from your iPhone and Android! Please provide this summary of care documentation to your next provider. Your primary care clinician is listed as Rishabh. If you have any questions after today's visit, please call [phone].

## 2018-01-17 NOTE — PROGRESS NOTES
Glycohemoglobin is up at 6.9 so would increase metformin to 500 daily.   LDL cholesterol and total cholesterol elevated and HDL is lower so I would increase Pravachol from 40 mg daily to 80 mg daily

## 2018-01-22 DIAGNOSIS — E78.2 MIXED HYPERLIPIDEMIA: Primary | ICD-10-CM

## 2018-01-22 RX ORDER — METFORMIN HYDROCHLORIDE 500 MG/1
TABLET, EXTENDED RELEASE ORAL
Qty: 180 TAB | Refills: 3 | Status: SHIPPED | OUTPATIENT
Start: 2018-01-22 | End: 2018-07-11 | Stop reason: SDUPTHER

## 2018-01-22 RX ORDER — PRAVASTATIN SODIUM 80 MG/1
80 TABLET ORAL
Qty: 90 TAB | Refills: 3 | Status: SHIPPED | OUTPATIENT
Start: 2018-01-22 | End: 2019-06-07 | Stop reason: SDUPTHER

## 2018-01-22 NOTE — TELEPHONE ENCOUNTER
Requested Prescriptions     Pending Prescriptions Disp Refills    metFORMIN ER (GLUCOPHAGE XR) 500 mg tablet 180 Tab 3     Sig: Take one tablet in the morning with breakfast and one tablet in the evening with dinner.  pravastatin (PRAVACHOL) 80 mg tablet 90 Tab 3     Sig: Take 1 Tab by mouth nightly.

## 2018-01-22 NOTE — PROGRESS NOTES
Glycohemoglobin is up at 6.9 so would increase metformin to 500 daily.  LDL cholesterol and total cholesterol elevated and HDL is lower so I would increase Pravachol from 40 mg daily to 80 mg daily. Patient informed. She was supposed to be taking the Metformin Er 500 mg bid but was only taking at dinner. So discussed that. Also discussed increasing the Pravastatin to 80 mg qhs. New rx's sent to patient's pharmacy.

## 2018-01-23 DIAGNOSIS — R05.9 COUGH: Primary | ICD-10-CM

## 2018-01-23 RX ORDER — BENZONATATE 200 MG/1
CAPSULE ORAL
Qty: 30 CAP | Refills: 0 | Status: SHIPPED | OUTPATIENT
Start: 2018-01-23 | End: 2018-04-09 | Stop reason: ALTCHOICE

## 2018-01-23 NOTE — TELEPHONE ENCOUNTER
Requested Prescriptions     Pending Prescriptions Disp Refills    benzonatate (TESSALON) 200 mg capsule [Pharmacy Med Name: BENZONATATE  200MG] 30 Cap 0     Sig: TAKE 1 CAPSULE THREE TIMES DAILY IF NEEDED FOR COUGH

## 2018-01-28 DIAGNOSIS — F32.A DEPRESSION, UNSPECIFIED DEPRESSION TYPE: Primary | ICD-10-CM

## 2018-01-29 RX ORDER — SERTRALINE HYDROCHLORIDE 100 MG/1
TABLET, FILM COATED ORAL
Qty: 90 TAB | Refills: 3 | Status: SHIPPED | OUTPATIENT
Start: 2018-01-29 | End: 2018-07-06 | Stop reason: SDUPTHER

## 2018-01-29 NOTE — TELEPHONE ENCOUNTER
Requested Prescriptions     Pending Prescriptions Disp Refills    sertraline (ZOLOFT) 100 mg tablet [Pharmacy Med Name: SERTRALINE 100MG TABLETS] 90 Tab 3     Sig: TAKE 1 TABLET BY MOUTH DAILY

## 2018-02-24 ENCOUNTER — HOSPITAL ENCOUNTER (OUTPATIENT)
Dept: MRI IMAGING | Age: 73
Discharge: HOME | End: 2018-02-24
Attending: ORTHOPAEDIC SURGERY
Payer: COMMERCIAL

## 2018-02-24 ENCOUNTER — HOSPITAL ENCOUNTER (OUTPATIENT)
Dept: CT IMAGING | Age: 73
Discharge: HOME | End: 2018-02-24
Attending: ORTHOPAEDIC SURGERY
Payer: COMMERCIAL

## 2018-02-24 DIAGNOSIS — Z98.1 S/P LUMBAR FUSION: ICD-10-CM

## 2018-02-24 DIAGNOSIS — M67.911 ROTATOR CUFF DISORDER, RIGHT: ICD-10-CM

## 2018-02-24 PROCEDURE — 72148 MRI LUMBAR SPINE W/O DYE: CPT

## 2018-02-24 PROCEDURE — 73200 CT UPPER EXTREMITY W/O DYE: CPT

## 2018-04-09 ENCOUNTER — OFFICE VISIT (OUTPATIENT)
Dept: INTERNAL MEDICINE CLINIC | Age: 73
End: 2018-04-09

## 2018-04-09 VITALS
HEART RATE: 77 BPM | RESPIRATION RATE: 20 BRPM | HEIGHT: 64 IN | DIASTOLIC BLOOD PRESSURE: 78 MMHG | BODY MASS INDEX: 33.26 KG/M2 | SYSTOLIC BLOOD PRESSURE: 108 MMHG | TEMPERATURE: 99.5 F | WEIGHT: 194.8 LBS | OXYGEN SATURATION: 96 %

## 2018-04-09 DIAGNOSIS — F33.9 RECURRENT DEPRESSION (HCC): ICD-10-CM

## 2018-04-09 DIAGNOSIS — M15.9 PRIMARY OSTEOARTHRITIS INVOLVING MULTIPLE JOINTS: ICD-10-CM

## 2018-04-09 DIAGNOSIS — I12.9 HYPERTENSION WITH RENAL DISEASE: Primary | ICD-10-CM

## 2018-04-09 DIAGNOSIS — K58.9 IRRITABLE BOWEL SYNDROME, UNSPECIFIED TYPE: ICD-10-CM

## 2018-04-09 DIAGNOSIS — N18.2 CKD (CHRONIC KIDNEY DISEASE), STAGE II: ICD-10-CM

## 2018-04-09 DIAGNOSIS — E11.22 CONTROLLED TYPE 2 DIABETES MELLITUS WITH STAGE 2 CHRONIC KIDNEY DISEASE, WITH LONG-TERM CURRENT USE OF INSULIN (HCC): ICD-10-CM

## 2018-04-09 DIAGNOSIS — G47.9 SLEEP DISTURBANCE: ICD-10-CM

## 2018-04-09 DIAGNOSIS — M35.3 POLYMYALGIA RHEUMATICA (HCC): ICD-10-CM

## 2018-04-09 DIAGNOSIS — E66.09 CLASS 1 OBESITY DUE TO EXCESS CALORIES WITHOUT SERIOUS COMORBIDITY WITH BODY MASS INDEX (BMI) OF 33.0 TO 33.9 IN ADULT: ICD-10-CM

## 2018-04-09 DIAGNOSIS — E55.9 VITAMIN D DEFICIENCY: ICD-10-CM

## 2018-04-09 DIAGNOSIS — E78.2 MIXED HYPERLIPIDEMIA: ICD-10-CM

## 2018-04-09 DIAGNOSIS — J30.89 NON-SEASONAL ALLERGIC RHINITIS, UNSPECIFIED TRIGGER: ICD-10-CM

## 2018-04-09 DIAGNOSIS — M87.059 AVASCULAR NECROSIS OF BONE OF HIP, UNSPECIFIED LATERALITY (HCC): ICD-10-CM

## 2018-04-09 DIAGNOSIS — Z79.4 CONTROLLED TYPE 2 DIABETES MELLITUS WITH STAGE 2 CHRONIC KIDNEY DISEASE, WITH LONG-TERM CURRENT USE OF INSULIN (HCC): ICD-10-CM

## 2018-04-09 DIAGNOSIS — N18.2 CONTROLLED TYPE 2 DIABETES MELLITUS WITH STAGE 2 CHRONIC KIDNEY DISEASE, WITH LONG-TERM CURRENT USE OF INSULIN (HCC): ICD-10-CM

## 2018-04-09 DIAGNOSIS — I73.9 CLAUDICATION (HCC): ICD-10-CM

## 2018-04-09 LAB
ALBUMIN SERPL-MCNC: 4.3 G/DL (ref 3.9–5.4)
ALKALINE PHOS POC: 86 U/L (ref 38–126)
ALT SERPL-CCNC: 102 U/L (ref 9–52)
AST SERPL-CCNC: 60 U/L (ref 14–36)
BUN BLD-MCNC: 19 MG/DL (ref 7–17)
CALCIUM BLD-MCNC: 9.3 MG/DL (ref 8.4–10.2)
CHLORIDE BLD-SCNC: 103 MMOL/L (ref 98–107)
CHOLEST SERPL-MCNC: 218 MG/DL (ref 0–200)
CK (CPK) POC: 53 U/L (ref 30–135)
CO2 POC: 30 MMOL/L (ref 22–32)
CREAT BLD-MCNC: 0.7 MG/DL (ref 0.7–1.2)
EGFR (POC): 87.2
GLUCOSE POC: 177 MG/DL (ref 65–105)
HBA1C MFR BLD HPLC: 7.1 % (ref 4.5–5.7)
HDLC SERPL-MCNC: 56 MG/DL (ref 35–130)
LDL CHOLESTEROL POC: 119.4 MG/DL (ref 0–130)
POTASSIUM SERPL-SCNC: 4.3 MMOL/L (ref 3.6–5)
PROT SERPL-MCNC: 7.3 G/DL (ref 6.3–8.2)
SODIUM SERPL-SCNC: 142 MMOL/L (ref 137–145)
TCHOL/HDL RATIO (POC): 3.9 (ref 0–4)
TOTAL BILIRUBIN POC: 1 MG/DL (ref 0.2–1.3)
TRIGL SERPL-MCNC: 213 MG/DL (ref 0–200)
VLDLC SERPL CALC-MCNC: 42.6 MG/DL

## 2018-04-09 RX ORDER — OXAZEPAM 15 MG/1
15 CAPSULE ORAL
COMMUNITY
Start: 2018-01-31 | End: 2018-04-23 | Stop reason: SDUPTHER

## 2018-04-09 RX ORDER — FEXOFENADINE HCL AND PSEUDOEPHEDRINE HCI 180; 240 MG/1; MG/1
1 TABLET, EXTENDED RELEASE ORAL
COMMUNITY
End: 2019-01-14 | Stop reason: ALTCHOICE

## 2018-04-09 NOTE — PROGRESS NOTES
Chief Complaint   Patient presents with    Hypertension     3 mo. f/u    Diabetes     3 mo. f/u    Chronic Kidney Disease     3 mo. f/u    Cholesterol Problem     3 mo. f/u       SUBJECTIVE:    William Fernandes is a 72 y.o. female who returns today for follow-up of medical problems include hypertension, diabetes, hyperlipidemia, CKD stage II, DJD, irritable bowel syndrome, history of polymyalgia rheumatica, history depression, obesity and other medical problems. She is taking medications and trying to follow her diet and try to get some exercise but she is concerned because she gets some left hip discomfort when she walks a significant amount and it seems to persist to the point when she has to rest because of it. She does note that she has had bilateral hip surgery because of avascular necrosis as well as had lumbar spine surgery and she saw an orthopedist who told her she needed to have repeat back surgery at a level higher than what she had before but she is not convinced that sounds her. She denies any other arthritic complaints. She denies any chest pain, shortness of breath, palpitations or cardiorespiratory complaints. She denies any GI or  complaints. She denies any headaches, dizziness or neurologic complaints. She does have excessive daytime somnolence and she says that her family tells her she snores a lot she is concerned she could have sleep apnea. She has no other complaints on complete review of systems. Current Outpatient Prescriptions   Medication Sig Dispense Refill    fexofenadine-pseudoephedrine (ALLEGRA-D 24 HOUR) 180-240 mg per tablet Take 1 Tab by mouth daily.  oxazepam (SERAX) 15 mg capsule 15 mg nightly as needed.  sertraline (ZOLOFT) 100 mg tablet TAKE 1 TABLET BY MOUTH DAILY 90 Tab 3    metFORMIN ER (GLUCOPHAGE XR) 500 mg tablet Take one tablet in the morning with breakfast and one tablet in the evening with dinner.  180 Tab 3    pravastatin (PRAVACHOL) 80 mg tablet Take 1 Tab by mouth nightly. 90 Tab 3    hydroCHLOROthiazide (HYDRODIURIL) 25 mg tablet TAKE 1 TABLET BY MOUTH DAILY 90 Tab 3    buPROPion XL (WELLBUTRIN XL) 150 mg tablet TAKE 1 TABLET BY MOUTH EVERY DAY 90 Tab 0    albuterol (VENTOLIN HFA) 90 mcg/actuation inhaler Take  by inhalation.  levalbuterol (XOPENEX) 1.25 mg/0.5 mL nebu 1.25 mg by Nebulization route.  GLUCOSAMINE/MSM/CHONDROITIN A (GLUCOSAMINE HCL-MSM-CHONDROITN PO) Take  by mouth.  multivitamin (ONE A DAY) tablet Take 1 Tab by mouth daily.  SHARK CARTILAGE PO Take  by mouth.  mometasone (ELOCON) 0.1 % topical cream Apply  to affected area daily.  azelaic acid (FINACEA) 15 % topical gel Apply  to affected area two (2) times a day.  diclofenac EC (VOLTAREN) 75 mg EC tablet Take 1 Tab by mouth two (2) times a day. 60 Tab 11    carvedilol (COREG) 6.25 mg tablet Take 3.125 mg by mouth daily.  clindamycin (CLINDAGEL) 1 % topical gel Apply  to affected area two (2) times a day. use thin film on affected area      loratadine-pseudoephedrine (CLARITIN-D 12 HOUR) 5-120 mg per tablet Take 1 Tab by mouth two (2) times a day. Past Medical History:   Diagnosis Date    Abnormal LFTs 8/24/2017    Arthritis     OSTEO    Avascular necrosis of hip ([address] 8/24/2017    Breast CA (HCC)     BILATERAL    Chronic pain     CKD (chronic kidney disease), stage II 8/24/2017    Coagulation disorder ([address] 1984    ITP    Depression     Diabetes ([address]     TYPE 2; NIDDM    DJD (degenerative joint disease), multiple sites 8/24/2017    GI bleed 8/24/2017    Hyperlipemia     Hypertension     Hypertension with renal disease 8/24/2017    IBS (irritable bowel syndrome) 8/24/2017    Myalgia 8/24/2017    On statin therapy 8/24/2017    Overactive bladder 8/24/2017    Pneumonia 04/2015    HOSPITALIZED 3 WEEKS.     Polymyalgia rheumatica ([address] 8/24/2017    Prophylactic antibiotic 8/24/2017    Rosacea      Past Surgical History:   Procedure Laterality Date    BREAST SURGERY PROCEDURE UNLISTED      RECONSTRUCTION X2    HX APPENDECTOMY  1950    HX CATARACT REMOVAL Bilateral     W /IOL    HX GI      COLONOSCOPY    HX HEENT      WISDOM TEETH    HX HYSTERECTOMY  2000S    HX MASTECTOMY  1989    bilateral    HX MASTECTOMY  2001    HX ORTHOPAEDIC      back fusion 2008-LUMBAR    HX TUBAL LIGATION  1981    TOTAL HIP ARTHROPLASTY Left 11/16/2016    ANTERIOR APPROACH, DR Gwendolyn De La Torre; (POSTOP: STANDS ONE INCH TALLER ON LEFT FOOT)     Allergies   Allergen Reactions    Statins-Hmg-Coa Reductase Inhibitors Myalgia     Myalgia with pravachol and multiple statins    Codeine Rash     Rash on thighs    Darvon [Propoxyphene] Other (comments)     \"I see worms\"    Pcn [Penicillins] Rash    Sulfa (Sulfonamide Antibiotics) Rash       REVIEW OF SYSTEMS:  General: negative for - chills or fever, or weight loss or gain. Daytime somnolence  ENT: negative for - headaches, nasal congestion or tinnitus. Excessive snoring  Eyes: no blurred or visual changes  Neck: No stiffness or swollen nodes  Respiratory: negative for - cough, hemoptysis, shortness of breath or wheezing  Cardiovascular : negative for - chest pain, edema, palpitations or shortness of breath  Gastrointestinal: negative for - abdominal pain, blood in stools, heartburn or nausea/vomiting  Genito-Urinary: no dysuria, trouble voiding, or hematuria  Musculoskeletal: negative for - gait disturbance, joint pain, joint stiffness or joint swelling.   Left hip left flank discomfort with walking  Neurological: no TIA or stroke symptoms  Hematologic: no bruises, no bleeding  Lymphatic: no swollen glands  Integument: no lumps, mole changes, nail changes or rash  Endocrine:no malaise/lethargy poly uria or polydipsia or unexpected weight changes        Social History     Social History    Marital status:      Spouse name: N/A    Number of children: N/A    Years of education: N/A Social History Main Topics    Smoking status: Never Smoker    Smokeless tobacco: Never Used    Alcohol use No    Drug use: No    Sexual activity: No     Other Topics Concern    None     Social History Narrative     Family History   Problem Relation Age of Onset    Heart Disease Mother     Kidney Disease Mother     Lung Disease Mother      COPD    Anesth Problems Neg Hx        OBJECTIVE:     Visit Vitals    /78 (BP 1 Location: Left arm, BP Patient Position: Sitting)    Pulse 77    Temp 99.5 °F (37.5 °C) (Oral)    Resp 20    Ht 5' 4\" (1.626 m)    Wt 194 lb 12.8 oz (88.4 kg)    SpO2 96%    BMI 33.44 kg/m2     CONSTITUTIONAL:   well nourished, appears age appropriate  EYES: sclera anicteric, PERRL, EOMI  ENMT:nares clear, moist mucous membranes, pharynx clear  NECK: supple. Thyroid normal, No JVD or bruits  RESPIRATORY: Chest: clear to ascultation and percussion, normal inspiratory effort  CARDIOVASCULAR: Heart: regular rate and rhythm no murmurs, rubs or gallops, PMI not displaced, No thrills  GASTROINTESTINAL: Abdomen: non distended, soft, non tender, bowel sounds normal  HEMATOLOGIC: no purpura, petechiae or bruising  LYMPHATIC: No lymph node enlargemant  MUSCULOSKELETAL: Extremities: no edema or active synovitis, pulse 1+   INTEGUMENT: No unusual rashes or suspicious skin lesions noted. Nails appear normal.  PERIPHERAL VASCULAR: normal pulses femoral, PT and DP  NEUROLOGIC: non-focal exam, A & O X 3  PSYCHIATRIC:, appropriate affect     ASSESSMENT:   1. Hypertension with renal disease    2. Controlled type 2 diabetes mellitus with stage 2 chronic kidney disease, with long-term current use of insulin ([facility]    3. Mixed hyperlipidemia    4. Primary osteoarthritis involving multiple joints    5. CKD (chronic kidney disease), stage II    6. Class 1 obesity due to excess calories without serious comorbidity with body mass index (BMI) of 33.0 to 33.9 in adult    7.  Irritable bowel syndrome, unspecified type    8. Non-seasonal allergic rhinitis, unspecified trigger    9. Vitamin D deficiency    10. Avascular necrosis of bone of hip, unspecified laterality ([facility]    11. Polymyalgia rheumatica ([facility]    12. Recurrent depression ([facility]    13. Claudication ([facility]    14. Sleep disturbance      Impression  1. Hypertension that is controlled continue current therapy reviewed with her  2. Diabetes repeat status pending reviewed prior labs with her will make adjustments if necessary  3. Hyperlipidemia prior labs reviewed repeat status pending will adjust medicines if needed  4. DJD her low back and left flank and hip discomfort may be arthritic but I think we need to rule out vascular  5. CKD repeat status pending reviewed prior labs  6. Obesity discussed diet exercise weight reduction for overall health benefit  7. IBS that is stable  8. Allergic rhinitis stable  9. Vitamin D deficiency good on last check  10. Status post bilateral hip replacement because of avascular necrosis I do not think the hip is causing a problem with pain  11. History of polymyalgia rheumatica that seems to be in remission  12. Prior depression that is stable  13. Hip and flank discomfort could be claudication I think we need to rule out vascular this study scheduled  14. Sleep disturbance we will set her up for a sleep study to rule out sleep apnea  Above labs are pending I will call results and make recommendations. Follow stable continue same and I will recheck a myself again in 3 months or sooner should they be a problem.     PLAN:  .  Orders Placed This Encounter    DUPLEX LOWER EXT ARTERY BILAT    REFERRAL TO PULMONARY DISEASE    AMB POC LIPID PROFILE    AMB POC HEMOGLOBIN A1C    AMB POC COMPREHENSIVE METABOLIC PANEL    AMB POC CK (CPK)    fexofenadine-pseudoephedrine (ALLEGRA-D 24 HOUR) 180-240 mg per tablet    oxazepam (SERAX) 15 mg capsule         ATTENTION:   This medical record was transcribed using an electronic medical records system. Although proofread, it may and can contain electronic and spelling errors. Other human spelling and other errors may be present. Corrections may be executed at a later time. Please feel free to contact us for any clarifications as needed. Follow-up Disposition:  Return in about 3 months (around 7/9/2018). No results found for any visits on 04/09/18. Andrzej Mendoza MD    The patient verbalized understanding of the problems and plans as explained. (4) excellent

## 2018-04-09 NOTE — MR AVS SNAPSHOT
[facility] 
 
 
 [address] [address] [phone] Patient: Conrad Goldsmith MRN: [id number] WDW:[id number] Visit Information Date & Time Provider Department Dept. Phone Encounter #  
 4/9/2018  8:10 AM Leandro Sarah MD [address] [place] [address] [phone] [id number] Follow-up Instructions Return in about 3 months (around 7/9/2018). Follow-up and Disposition History Your Appointments 7/6/2018 10:20 AM  
FOLLOW UP 10 with Leandro Sarah MD  
[facility] ([address]) Appt Note: 3 MO FLP; 3 MO [address] [id number] [address] [address] Upcoming Health Maintenance Date Due DTaP/Tdap/Td series (1 - Tdap) 1/15/1968 ZOSTER VACCINE AGE 60> 11/15/2006 EYE EXAM RETINAL OR DILATED Q1 5/15/2018 HEMOGLOBIN A1C Q6M 7/15/2018 FOOT EXAM Q1 10/2/2018 MICROALBUMIN Q1 10/2/2018 LIPID PANEL Q1 1/15/2019 MEDICARE YEARLY EXAM 1/16/2019 GLAUCOMA SCREENING Q2Y 5/15/2019 BREAST CANCER SCRN MAMMOGRAM 10/2/2019 COLONOSCOPY 10/3/2026 Allergies as of 4/9/2018  Review Complete On: 4/9/2018 By: Leandro Sarah MD  
  
 Severity Noted Reaction Type Reactions Statins-hmg-coa Reductase Inhibitors High 11/20/2016    Myalgia Myalgia with pravachol and multiple statins Codeine  04/20/2015    Rash Rash on thighs Darvon [Propoxyphene]  04/20/2015    Other (comments) \"I see worms\" Pcn [Penicillins]  04/20/2015    Rash  
 Sulfa (Sulfonamide Antibiotics)  04/20/2015    Rash Current Immunizations  Reviewed on 5/6/2015 Name Date Influenza High Dose Vaccine PF 10/2/2017 Influenza Vaccine 11/2/2015 Pneumococcal Conjugate (PCV-13) 7/27/2015 Pneumococcal Vaccine (Unspecified Type) 1/1/2013, 12/23/2011 Not reviewed this visit You Were Diagnosed With   
  
 Codes Comments Hypertension with renal disease    -  Primary ICD-10-CM: I12.9 ICD-9-CM: 403.90 Controlled type 2 diabetes mellitus with stage 2 chronic kidney disease, with long-term current use of insulin (HCC)     ICD-10-CM: E11.22, N18.2, Z79.4 ICD-9-CM: 250.40, 585.2, V58.67 Mixed hyperlipidemia     ICD-10-CM: E78.2 ICD-9-CM: 272.2 Primary osteoarthritis involving multiple joints     ICD-10-CM: M15.0 ICD-9-CM: 715.09   
 CKD (chronic kidney disease), stage II     ICD-10-CM: N18.2 ICD-9-CM: 024. 2 Class 1 obesity due to excess calories without serious comorbidity with body mass index (BMI) of 33.0 to 33.9 in adult     ICD-10-CM: E66.09, Z68.33 
ICD-9-CM: 278.00, V85.33 Irritable bowel syndrome, unspecified type     ICD-10-CM: K58.9 ICD-9-CM: 139.2 Non-seasonal allergic rhinitis, unspecified trigger     ICD-10-CM: J30.89 ICD-9-CM: 477.8 Vitamin D deficiency     ICD-10-CM: E55.9 ICD-9-CM: 268.9 Avascular necrosis of bone of hip, unspecified laterality ([facility]     ICD-10-CM: M87.059 ICD-9-CM: 733.42 Polymyalgia rheumatica (HCC)     ICD-10-CM: M35.3 ICD-9-CM: 440 Recurrent depression ([facility]     ICD-10-CM: F33.9 ICD-9-CM: 296.30 Claudication [facility])     ICD-10-CM: I73.9 ICD-9-CM: 443.9 Sleep disturbance     ICD-10-CM: G47.9 ICD-9-CM: 780.50 Vitals BP Pulse Temp Resp Height(growth percentile) Weight(growth percentile) 108/78 (BP 1 Location: Left arm, BP Patient Position: Sitting) 77 99.5 °F (37.5 °C) (Oral) 20 5' 4\" (1.626 m) 194 lb 12.8 oz (88.4 kg) SpO2 BMI OB Status Smoking Status 96% 33.44 kg/m2 Hysterectomy Never Smoker Vitals History BMI and BSA Data Body Mass Index Body Surface Area  
 33.44 kg/m 2 2 m 2 Preferred Pharmacy Pharmacy Name Phone [facility] [phone] Your Updated Medication List  
  
   
This list is accurate as of 4/9/18  9:23 AM.  Always use your most recent med list. ALLEGRA-D 24 HOUR 180-240 mg per tablet Generic drug:  fexofenadine-pseudoephedrine Take 1 Tab by mouth daily. buPROPion  mg tablet Commonly known as:  WELLBUTRIN XL  
TAKE 1 TABLET BY MOUTH EVERY DAY  
  
 CLARITIN-D 12 HOUR 5-120 mg per tablet Generic drug:  loratadine-pseudoephedrine Take 1 Tab by mouth two (2) times a day. clindamycin 1 % topical gel Commonly known as:  CLINDAGEL Apply  to affected area two (2) times a day. use thin film on affected area COREG 6.25 mg tablet Generic drug:  carvedilol Take 3.125 mg by mouth daily. diclofenac EC 75 mg EC tablet Commonly known as:  VOLTAREN Take 1 Tab by mouth two (2) times a day. ELOCON 0.1 % topical cream  
Generic drug:  mometasone Apply  to affected area daily. FINACEA 15 % topical gel Generic drug:  azelaic acid Apply  to affected area two (2) times a day. GLUCOSAMINE HCL-MSM-CHONDROITN PO Take  by mouth. hydroCHLOROthiazide 25 mg tablet Commonly known as:  HYDRODIURIL  
TAKE 1 TABLET BY MOUTH DAILY  
  
 levalbuterol 1.25 mg/0.5 mL Nebu Commonly known as:  XOPENEX  
1.25 mg by Nebulization route. metFORMIN  mg tablet Commonly known as:  GLUCOPHAGE XR Take one tablet in the morning with breakfast and one tablet in the evening with dinner. multivitamin tablet Commonly known as:  ONE A DAY Take 1 Tab by mouth daily. oxazepam 15 mg capsule Commonly known as:  SERAX 15 mg nightly as needed. pravastatin 80 mg tablet Commonly known as:  PRAVACHOL Take 1 Tab by mouth nightly. sertraline 100 mg tablet Commonly known as:  ZOLOFT  
TAKE 1 TABLET BY MOUTH DAILY SHARK CARTILAGE PO Take  by mouth. VENTOLIN HFA 90 mcg/actuation inhaler Generic drug:  albuterol Take  by inhalation. We Performed the Following AMB POC CK (CPK) [29456 CPT(R)] AMB POC COMPREHENSIVE METABOLIC PANEL [05028 CPT(R)] AMB POC HEMOGLOBIN A1C [97705 CPT(R)] AMB POC LIPID PROFILE [36407 CPT(R)]  DIABETES FOOT EXAM [HM7 Custom] REFERRAL TO PULMONARY DISEASE [MLA64 Custom] Comments:  
 Needs sleep study for evaluation of daytime somnolence and excessive snoring Follow-up Instructions Return in about 3 months (around 7/9/2018). To-Do List   
 04/09/2018 Imaging:  DUPLEX LOWER EXT ARTERY BILAT Referral Information Referral ID Referred By Referred To  
  
 [id number] Prema Hyde MD   
   [facility] [address] [place] [address], [facility] [address] [facility] Phone: [phone] Fax: [phone] Visits Status Start Date End Date 1 New Request 4/9/18 4/9/19 If your referral has a status of pending review or denied, additional information will be sent to support the outcome of this decision. Patient Instructions Arthritis: Care Instructions Your Care Instructions Arthritis, also called osteoarthritis, is a breakdown of the cartilage that cushions your joints. When the cartilage wears down, your bones rub against each other. This causes pain and stiffness. Many people have some arthritis as they age. Arthritis most often affects the joints of the spine, hands, hips, knees, or feet. You can take simple measures to protect your joints, ease your pain, and help you stay active. Follow-up care is a key part of your treatment and safety. Be sure to make and go to all appointments, and call your doctor if you are having problems. It's also a good idea to know your test results and keep a list of the medicines you take. How can you care for yourself at home? · Stay at a healthy weight. Being overweight puts extra strain on your joints. · Talk to your doctor or physical therapist about exercises that will help ease joint pain. ¨ Stretch. You may enjoy gentle forms of yoga to help keep your joints and muscles flexible. ¨ Walk instead of jog. Other types of exercise that are less stressful on the joints include riding a bicycle, swimming, bebo chi, or water exercise. ¨ Lift weights. Strong muscles help reduce stress on your joints. Stronger thigh muscles, for example, take some of the stress off of the knees and hips. Learn the right way to lift weights so you do not make joint pain worse. · Take your medicines exactly as prescribed. Call your doctor if you think you are having a problem with your medicine. · Take pain medicines exactly as directed. ¨ If the doctor gave you a prescription medicine for pain, take it as prescribed. ¨ If you are not taking a prescription pain medicine, ask your doctor if you can take an over-the-counter medicine. · Use a cane, crutch, walker, or another device if you need help to get around. These can help rest your joints. You also can use other things to make life easier, such as a higher toilet seat and padded handles on kitchen utensils. · Do not sit in low chairs, which can make it hard to get up. · Put heat or cold on your sore joints as needed. Use whichever helps you most. You also can take turns with hot and cold packs. ¨ Apply heat 2 or 3 times a day for 20 to 30 minutes-using a heating pad, hot shower, or hot pack-to relieve pain and stiffness. ¨ Put ice or a cold pack on your sore joint for 10 to 20 minutes at a time. Put a thin cloth between the ice and your skin. When should you call for help? Call your doctor now or seek immediate medical care if: 
? · You have sudden swelling, warmth, or pain in any joint. ? · You have joint pain and a fever or rash. ? · You have such bad pain that you cannot use a joint. ? Watch closely for changes in your health, and be sure to contact your doctor if: 
? · You have mild joint symptoms that continue even with more than 6 weeks of care at home. ? · You have stomach pain or other problems with your medicine. Where can you learn more? Go to http://malia-lincoln.info/. Enter P982 in the search box to learn more about \"Arthritis: Care Instructions. \" Current as of: October 31, 2016 Content Version: 11.4 © 9085-8716 [employer]. Care instructions adapted under license by [employer] (which disclaims liability or warranty for this information). If you have questions about a medical condition or this instruction, always ask your healthcare professional. [address] any warranty or liability for your use of this information. Patient Instructions History Introducing [facility]! [facility] introduces [employer] patient portal. Now you can access parts of your medical record, email your doctor's office, and request medication refills online. 1. In your internet browser, go to [email or web address] 2. Click on the First Time User? Click Here link in the Sign In box. You will see the New Member Sign Up page. 3. Enter your [employer] Access Code exactly as it appears below. You will not need to use this code after youve completed the sign-up process. If you do not sign up before the expiration date, you must request a new code. · [employer] Access Code: [email or web address] Expires: 7/8/2018  8:01 AM 
 
4. Enter the last four digits of your Social Security Number (xxxx) and Date of Birth (mm/dd/yyyy) as indicated and click Submit. You will be taken to the next sign-up page. 5. Create a [employer] ID. This will be your [employer] login ID and cannot be changed, so think of one that is secure and easy to remember. 6. Create a [employer] password. You can change your password at any time. 7. Enter your Password Reset Question and Answer.  This can be used at a later time if you forget your password. 8. Enter your e-mail address. You will receive e-mail notification when new information is available in [address] 9. Click Sign Up. You can now view and download portions of your medical record. 10. Click the Download Summary menu link to download a portable copy of your medical information. If you have questions, please visit the Frequently Asked Questions section of the [employer] website. Remember, [employer] is NOT to be used for urgent needs. For medical emergencies, dial 911. Now available from your iPhone and Android! Please provide this summary of care documentation to your next provider. Your primary care clinician is listed as Rishabh. If you have any questions after today's visit, please call [phone].

## 2018-04-09 NOTE — PATIENT INSTRUCTIONS
Arthritis: Care Instructions  Your Care Instructions  Arthritis, also called osteoarthritis, is a breakdown of the cartilage that cushions your joints. When the cartilage wears down, your bones rub against each other. This causes pain and stiffness. Many people have some arthritis as they age. Arthritis most often affects the joints of the spine, hands, hips, knees, or feet. You can take simple measures to protect your joints, ease your pain, and help you stay active. Follow-up care is a key part of your treatment and safety. Be sure to make and go to all appointments, and call your doctor if you are having problems. It's also a good idea to know your test results and keep a list of the medicines you take. How can you care for yourself at home? · Stay at a healthy weight. Being overweight puts extra strain on your joints. · Talk to your doctor or physical therapist about exercises that will help ease joint pain. ¨ Stretch. You may enjoy gentle forms of yoga to help keep your joints and muscles flexible. ¨ Walk instead of jog. Other types of exercise that are less stressful on the joints include riding a bicycle, swimming, bebo chi, or water exercise. ¨ Lift weights. Strong muscles help reduce stress on your joints. Stronger thigh muscles, for example, take some of the stress off of the knees and hips. Learn the right way to lift weights so you do not make joint pain worse. · Take your medicines exactly as prescribed. Call your doctor if you think you are having a problem with your medicine. · Take pain medicines exactly as directed. ¨ If the doctor gave you a prescription medicine for pain, take it as prescribed. ¨ If you are not taking a prescription pain medicine, ask your doctor if you can take an over-the-counter medicine. · Use a cane, crutch, walker, or another device if you need help to get around. These can help rest your joints.  You also can use other things to make life easier, such as a higher toilet seat and padded handles on kitchen utensils. · Do not sit in low chairs, which can make it hard to get up. · Put heat or cold on your sore joints as needed. Use whichever helps you most. You also can take turns with hot and cold packs. ¨ Apply heat 2 or 3 times a day for 20 to 30 minutes-using a heating pad, hot shower, or hot pack-to relieve pain and stiffness. ¨ Put ice or a cold pack on your sore joint for 10 to 20 minutes at a time. Put a thin cloth between the ice and your skin. When should you call for help? Call your doctor now or seek immediate medical care if:  ? · You have sudden swelling, warmth, or pain in any joint. ? · You have joint pain and a fever or rash. ? · You have such bad pain that you cannot use a joint. ? Watch closely for changes in your health, and be sure to contact your doctor if:  ? · You have mild joint symptoms that continue even with more than 6 weeks of care at home. ? · You have stomach pain or other problems with your medicine. Where can you learn more? Go to http://malia-lincoln.info/. Enter Z961 in the search box to learn more about \"Arthritis: Care Instructions. \"  Current as of: October 31, 2016  Content Version: 11.4  © 3714-8618 Subtext. Care instructions adapted under license by [employer] (which disclaims liability or warranty for this information). If you have questions about a medical condition or this instruction, always ask your healthcare professional. [place] [address] any warranty or liability for your use of this information.

## 2018-04-09 NOTE — PROGRESS NOTES
1. Have you been to the ER, urgent care clinic since your last visit? Hospitalized since your last visit? No    2. Have you seen or consulted any other health care providers outside of the [address] [place] [address] since your last visit? Include any pap smears or colon screening. Yes, [id number], Dr. Dorothy Garcia, Orthopedic, for back and right shoulder. Stated she needs to have surgery on both, but does not want to have surgery. She also received two lumber injections at L[facility]. Chief Complaint   Patient presents with    Hypertension     3 mo. f/u    Diabetes     3 mo. f/u    Chronic Kidney Disease     3 mo. f/u    Cholesterol Problem     3 mo.  f/u       Fasting

## 2018-04-19 DIAGNOSIS — I73.9 CLAUDICATION (HCC): Primary | ICD-10-CM

## 2018-04-23 DIAGNOSIS — F41.9 ANXIETY: Primary | ICD-10-CM

## 2018-04-23 RX ORDER — OXAZEPAM 15 MG/1
CAPSULE ORAL
Qty: 60 CAP | Refills: 2 | Status: SHIPPED | OUTPATIENT
Start: 2018-04-23 | End: 2018-11-20 | Stop reason: SDUPTHER

## 2018-04-30 ENCOUNTER — HOSPITAL ENCOUNTER (OUTPATIENT)
Dept: VASCULAR SURGERY | Age: 73
Discharge: HOME | End: 2018-04-30
Payer: COMMERCIAL

## 2018-04-30 DIAGNOSIS — I73.9 CLAUDICATION (HCC): ICD-10-CM

## 2018-04-30 PROCEDURE — 93924 LWR XTR VASC STDY BILAT: CPT

## 2018-04-30 NOTE — PROCEDURES
[facility]  *** FINAL REPORT ***    Name: Talib Melchor  MRN: [id number]    Outpatient  : [address] 1945  HIS Order #: [id number]  [address] [facility] Visit #: [id number]  Date: 2018    TYPE OF TEST: Peripheral Arterial Testing    REASON FOR TEST  Claudication    Right Leg  Segmentals: Normal                     mmHg  Brachial         125  High thigh  Low thigh        156  Calf             142  Posterior tibial 140  Dorsalis pedis   132  Peroneal  Metatarsal  Toe pressure      75  Doppler:    Normal  Ankle/Brachial: 1.12    Left Leg  Segmentals: Normal                     mmHg  Brachial         113  High thigh  Low thigh        146  Calf             152  Posterior tibial 150  Dorsalis pedis   139  Peroneal  Metatarsal  Toe pressure      81  Doppler:    Normal  Ankle/Brachial: 1.20  Post exercise results:  Speed: 1.7  mph  Grade: 10  %  Duration: 2MIN     Brachial  Right Ankle  PAMELA    Left Ankle  PAMELA    1:   144         179     1.24       187     1.30  2:  3:  4:  5:    INTERPRETATION/FINDINGS  PROCEDURE:  Multi-level lower extremity arterial segmental pressures,  CW Doppler waveforms and digital PPG waveforms were performed. 1. No evidence of significant peripheral arterial disease at rest in  the right leg. 2. No evidence of significant peripheral arterial disease at rest in  the left leg. 3. The right ankle/brachial index is 1.12 and the left ankle/brachial  index is 1.20.  4. The right great toe/brachial index is 0.60 and the left great  toe/brachial index is 0.65.  5. No significant drop in the ankle brachial index bilaterally with  exercise, consistent with normal peripheral arterial perfusion. ADDITIONAL COMMENTS    I have personally reviewed the data relevant to the interpretation of  this  study. TECHNOLOGIST: Brittany Jamil RVT  Signed: 2018 02:05 PM    PHYSICIAN: Naye Kilgore.  Elgin Mckay MD  Signed: 2018 04:10 PM

## 2018-07-06 ENCOUNTER — OFFICE VISIT (OUTPATIENT)
Dept: INTERNAL MEDICINE CLINIC | Age: 73
End: 2018-07-06

## 2018-07-06 VITALS
SYSTOLIC BLOOD PRESSURE: 113 MMHG | RESPIRATION RATE: 16 BRPM | HEIGHT: 64 IN | OXYGEN SATURATION: 95 % | HEART RATE: 89 BPM | DIASTOLIC BLOOD PRESSURE: 76 MMHG | WEIGHT: 189.8 LBS | BODY MASS INDEX: 32.4 KG/M2 | TEMPERATURE: 98.4 F

## 2018-07-06 DIAGNOSIS — M87.059 AVASCULAR NECROSIS OF BONE OF HIP, UNSPECIFIED LATERALITY (HCC): ICD-10-CM

## 2018-07-06 DIAGNOSIS — M15.9 PRIMARY OSTEOARTHRITIS INVOLVING MULTIPLE JOINTS: ICD-10-CM

## 2018-07-06 DIAGNOSIS — I12.9 HYPERTENSION WITH RENAL DISEASE: Primary | ICD-10-CM

## 2018-07-06 DIAGNOSIS — E66.09 CLASS 1 OBESITY DUE TO EXCESS CALORIES WITHOUT SERIOUS COMORBIDITY WITH BODY MASS INDEX (BMI) OF 33.0 TO 33.9 IN ADULT: ICD-10-CM

## 2018-07-06 DIAGNOSIS — Z79.4 CONTROLLED TYPE 2 DIABETES MELLITUS WITH STAGE 2 CHRONIC KIDNEY DISEASE, WITH LONG-TERM CURRENT USE OF INSULIN (HCC): ICD-10-CM

## 2018-07-06 DIAGNOSIS — F32.A DEPRESSION, UNSPECIFIED DEPRESSION TYPE: ICD-10-CM

## 2018-07-06 DIAGNOSIS — N18.2 CONTROLLED TYPE 2 DIABETES MELLITUS WITH STAGE 2 CHRONIC KIDNEY DISEASE, WITH LONG-TERM CURRENT USE OF INSULIN (HCC): ICD-10-CM

## 2018-07-06 DIAGNOSIS — F33.9 RECURRENT DEPRESSION (HCC): ICD-10-CM

## 2018-07-06 DIAGNOSIS — E78.2 MIXED HYPERLIPIDEMIA: ICD-10-CM

## 2018-07-06 DIAGNOSIS — N18.2 CKD (CHRONIC KIDNEY DISEASE), STAGE II: ICD-10-CM

## 2018-07-06 DIAGNOSIS — J30.89 NON-SEASONAL ALLERGIC RHINITIS, UNSPECIFIED TRIGGER: ICD-10-CM

## 2018-07-06 DIAGNOSIS — E11.22 CONTROLLED TYPE 2 DIABETES MELLITUS WITH STAGE 2 CHRONIC KIDNEY DISEASE, WITH LONG-TERM CURRENT USE OF INSULIN (HCC): ICD-10-CM

## 2018-07-06 RX ORDER — BUPROPION HYDROCHLORIDE 150 MG/1
TABLET ORAL
Qty: 90 TAB | Refills: 3 | Status: SHIPPED | OUTPATIENT
Start: 2018-07-06 | End: 2019-06-07 | Stop reason: SDUPTHER

## 2018-07-06 RX ORDER — SERTRALINE HYDROCHLORIDE 100 MG/1
TABLET, FILM COATED ORAL
Qty: 90 TAB | Refills: 3 | Status: SHIPPED | OUTPATIENT
Start: 2018-07-06 | End: 2019-10-22 | Stop reason: SDUPTHER

## 2018-07-06 RX ORDER — BENZONATATE 200 MG/1
200 CAPSULE ORAL
Qty: 30 CAP | Refills: 0 | Status: SHIPPED | OUTPATIENT
Start: 2018-07-06 | End: 2019-04-23 | Stop reason: SDUPTHER

## 2018-07-06 NOTE — MR AVS SNAPSHOT
[facility] 
 
 
 [address] [id number] [phone] Patient: Dale Jennings MRN: [id number] Visit Information Date & Time Provider Department Dept. Phone Encounter #  
 7/6/2018 10:20 AM Baljeet Castro MD [address] [place] [address] [facility] [phone] [id number] Your Appointments 7/11/2018  2:30 PM  
FOLLOW UP 10 with Baljeet Castro MD  
[facility] ([address]) Appt Note: Pre-op back surgery [address] [phone] [address] [address] Upcoming Health Maintenance Date Due DTaP/Tdap/Td series (1 - Tdap) 1/15/1968 ZOSTER VACCINE AGE 60> 11/15/2006 EYE EXAM RETINAL OR DILATED Q1 5/15/2018 Influenza Age 5 to Adult 8/1/2018 MICROALBUMIN Q1 10/2/2018 HEMOGLOBIN A1C Q6M 10/9/2018 MEDICARE YEARLY EXAM 1/16/2019 FOOT EXAM Q1 4/9/2019 LIPID PANEL Q1 4/9/2019 GLAUCOMA SCREENING Q2Y 5/15/2019 BREAST CANCER SCRN MAMMOGRAM 10/2/2019 COLONOSCOPY 10/3/2026 Allergies as of 7/6/2018  Review Complete On: 7/6/2018 By: Baljeet Castro MD  
  
 Severity Noted Reaction Type Reactions Statins-hmg-coa Reductase Inhibitors High 11/20/2016    Myalgia Myalgia with pravachol and multiple statins Codeine  04/20/2015    Rash Rash on thighs Darvon [Propoxyphene]  04/20/2015    Other (comments) \"I see worms\" Pcn [Penicillins]  04/20/2015    Rash  
 Sulfa (Sulfonamide Antibiotics)  04/20/2015    Rash Current Immunizations  Reviewed on 5/6/2015 Name Date Influenza High Dose Vaccine PF 10/2/2017 Influenza Vaccine 11/2/2015 Pneumococcal Conjugate (PCV-13) 7/27/2015 Pneumococcal Vaccine (Unspecified Type) 1/1/2013, 12/23/2011 Not reviewed this visit You Were Diagnosed With   
  
 Codes Comments Hypertension with renal disease    -  Primary ICD-10-CM: I12.9 ICD-9-CM: 403.90 Depression, unspecified depression type     ICD-10-CM: F32.9 ICD-9-CM: 137 Controlled type 2 diabetes mellitus with stage 2 chronic kidney disease, with long-term current use of insulin (HCC)     ICD-10-CM: E11.22, N18.2, Z79.4 ICD-9-CM: 250.40, 585.2, V58.67 Mixed hyperlipidemia     ICD-10-CM: E78.2 ICD-9-CM: 272.2 CKD (chronic kidney disease), stage II     ICD-10-CM: N18.2 ICD-9-CM: 585.2 Primary osteoarthritis involving multiple joints     ICD-10-CM: M15.0 ICD-9-CM: 715.09 Class 1 obesity due to excess calories without serious comorbidity with body mass index (BMI) of 33.0 to 33.9 in adult     ICD-10-CM: E66.09, Z68.33 
ICD-9-CM: 278.00, V85.33 Non-seasonal allergic rhinitis, unspecified trigger     ICD-10-CM: J30.89 ICD-9-CM: 477.8 Avascular necrosis of bone of hip, unspecified laterality ([facility]     ICD-10-CM: M87.059 ICD-9-CM: 733.42 Recurrent depression ([facility]     ICD-10-CM: F33.9 ICD-9-CM: 296.30 Vitals BP Pulse Temp Resp Height(growth percentile) Weight(growth percentile) 113/76 (BP 1 Location: Left arm, BP Patient Position: Sitting) 89 98.4 °F (36.9 °C) (Oral) 16 5' 4\" (1.626 m) 189 lb 12.8 oz (86.1 kg) SpO2 BMI OB Status Smoking Status 95% 32.58 kg/m2 Hysterectomy Never Smoker Vitals History BMI and BSA Data Body Mass Index Body Surface Area 32.58 kg/m 2 1.97 m 2 Preferred Pharmacy Pharmacy Name Phone [facility] [phone] Your Updated Medication List  
  
   
This list is accurate as of 7/6/18 11:10 AM.  Always use your most recent med list. ALLEGRA-D 24 HOUR 180-240 mg per tablet Generic drug:  fexofenadine-pseudoephedrine Take 1 Tab by mouth daily. benzonatate 200 mg capsule Commonly known as:  TESSALON  
 Take 1 Cap by mouth three (3) times daily as needed for Cough for up to 7 days. buPROPion  mg tablet Commonly known as:  WELLBUTRIN XL  
TAKE 1 TABLET BY MOUTH EVERY DAY  
  
 CLARITIN-D 12 HOUR 5-120 mg per tablet Generic drug:  loratadine-pseudoephedrine Take 1 Tab by mouth two (2) times a day. clindamycin 1 % topical gel Commonly known as:  CLINDAGEL Apply  to affected area two (2) times a day. use thin film on affected area COREG 6.25 mg tablet Generic drug:  carvedilol Take 3.125 mg by mouth daily. diclofenac EC 75 mg EC tablet Commonly known as:  VOLTAREN Take 1 Tab by mouth two (2) times a day. ELOCON 0.1 % topical cream  
Generic drug:  mometasone Apply  to affected area daily. FINACEA 15 % topical gel Generic drug:  azelaic acid Apply  to affected area two (2) times a day. hydroCHLOROthiazide 25 mg tablet Commonly known as:  HYDRODIURIL  
TAKE 1 TABLET BY MOUTH DAILY  
  
 levalbuterol 1.25 mg/0.5 mL Nebu Commonly known as:  XOPENEX  
1.25 mg by Nebulization route. metFORMIN  mg tablet Commonly known as:  GLUCOPHAGE XR Take one tablet in the morning with breakfast and one tablet in the evening with dinner. multivitamin tablet Commonly known as:  ONE A DAY Take 1 Tab by mouth daily. oxazepam 15 mg capsule Commonly known as:  SERAX TAKE 2 CAPSULES AT BEDTIME  
  
 pravastatin 80 mg tablet Commonly known as:  PRAVACHOL Take 1 Tab by mouth nightly. sertraline 100 mg tablet Commonly known as:  ZOLOFT  
TAKE 1 TABLET BY MOUTH DAILY SHARK CARTILAGE PO Take  by mouth. VENTOLIN HFA 90 mcg/actuation inhaler Generic drug:  albuterol Take  by inhalation. Prescriptions Sent to Pharmacy Refills buPROPion XL (WELLBUTRIN XL) 150 mg tablet 3 Sig: TAKE 1 TABLET BY MOUTH EVERY DAY  Class: Normal  
 Pharmacy: [facility] Ph #: [phone]  
 sertraline (ZOLOFT) 100 mg tablet 3 Sig: TAKE 1 TABLET BY MOUTH DAILY Class: Normal  
 Pharmacy: [facility] Ph #: [phone]  
 benzonatate (TESSALON) 200 mg capsule 0 Sig: Take 1 Cap by mouth three (3) times daily as needed for Cough for up to 7 days. Class: Normal  
 Pharmacy: [facility] Ph #: [phone] Route: Oral  
  
To-Do List   
 07/06/2018 11:30 AM  
  Appointment with [facility] PAT EXAM RM 6 at [address] ([phone]) Introducing [facility] SERVICES! Margi Valadez introduces [employer] patient portal. Now you can access parts of your medical record, email your doctor's office, and request medication refills online. 1. In your internet browser, go to [email or web address] 2. Click on the First Time User? Click Here link in the Sign In box. You will see the New Member Sign Up page. 3. Enter your [employer] Access Code exactly as it appears below. You will not need to use this code after youve completed the sign-up process. If you do not sign up before the expiration date, you must request a new code. · [employer] Access Code: [email or web address] Expires: 7/8/2018  8:01 AM 
 
4. Enter the last four digits of your Social Security Number (xxxx) and Date of Birth (mm/dd/yyyy) as indicated and click Submit. You will be taken to the next sign-up page. 5. Create a [employer] ID. This will be your [employer] login ID and cannot be changed, so think of one that is secure and easy to remember. 6. Create a [employer] password. You can change your password at any time. 7. Enter your Password Reset Question and Answer. This can be used at a later time if you forget your password. 8. Enter your e-mail address. You will receive e-mail notification when new information is available in [address] 9. Click Sign Up. You can now view and download portions of your medical record. 10. Click the Download Summary menu link to download a portable copy of your medical information. If you have questions, please visit the Frequently Asked Questions section of the [employer] website. Remember, [employer] is NOT to be used for urgent needs. For medical emergencies, dial 911. Now available from your iPhone and Android! Please provide this summary of care documentation to your next provider. Your primary care clinician is listed as Rishabh. If you have any questions after today's visit, please call [phone].

## 2018-07-06 NOTE — PROGRESS NOTES
Chief Complaint   Patient presents with    Pre-op Exam     lumbar fusion scheduled for 7/17/18       SUBJECTIVE:    Niko Colon is a 72 y.o. female who returns in follow-up of medical problems include hypertension, diabetes, hyperlipidemia, CKD stage II, obesity, IBS, history of avascular necrosis of her hip, and other medical problems. She is doing well and taken her medications and trying to follow her diet and get some exercise but she is having some back problems and has surgery scheduled for that as her exercise has been limited a little. She does have a cough that seems to be chronic but the Claritin seems to help that. She does want a refill on Tessalon. She denies any chest pain, shortness of breath, or other cardiorespiratory complaints. She denies any GI or  complaints. She denies any headaches, dizziness or neurologic complaints. She has no significant arthritic complaints other than the back pain for which surgery scheduled. Current Outpatient Prescriptions   Medication Sig Dispense Refill    buPROPion XL (WELLBUTRIN XL) 150 mg tablet TAKE 1 TABLET BY MOUTH EVERY DAY 90 Tab 3    sertraline (ZOLOFT) 100 mg tablet TAKE 1 TABLET BY MOUTH DAILY 90 Tab 3    benzonatate (TESSALON) 200 mg capsule Take 1 Cap by mouth three (3) times daily as needed for Cough for up to 7 days. 30 Cap 0    oxazepam (SERAX) 15 mg capsule TAKE 2 CAPSULES AT BEDTIME 60 Cap 2    fexofenadine-pseudoephedrine (ALLEGRA-D 24 HOUR) 180-240 mg per tablet Take 1 Tab by mouth daily.  metFORMIN ER (GLUCOPHAGE XR) 500 mg tablet Take one tablet in the morning with breakfast and one tablet in the evening with dinner. 180 Tab 3    pravastatin (PRAVACHOL) 80 mg tablet Take 1 Tab by mouth nightly. 90 Tab 3    hydroCHLOROthiazide (HYDRODIURIL) 25 mg tablet TAKE 1 TABLET BY MOUTH DAILY 90 Tab 3    albuterol (VENTOLIN HFA) 90 mcg/actuation inhaler Take  by inhalation.       levalbuterol (XOPENEX) 1.25 mg/0.5 mL nebu 1.25 mg by Nebulization route.  loratadine-pseudoephedrine (CLARITIN-D 12 HOUR) 5-120 mg per tablet Take 1 Tab by mouth two (2) times a day.  multivitamin (ONE A DAY) tablet Take 1 Tab by mouth daily.  SHARK CARTILAGE PO Take  by mouth.  mometasone (ELOCON) 0.1 % topical cream Apply  to affected area daily.  azelaic acid (FINACEA) 15 % topical gel Apply  to affected area two (2) times a day.  diclofenac EC (VOLTAREN) 75 mg EC tablet Take 1 Tab by mouth two (2) times a day. 60 Tab 11    carvedilol (COREG) 6.25 mg tablet Take 3.125 mg by mouth daily.  clindamycin (CLINDAGEL) 1 % topical gel Apply  to affected area two (2) times a day. use thin film on affected area       Past Medical History:   Diagnosis Date    Abnormal LFTs 8/24/2017    Arthritis     OSTEO    Avascular necrosis of hip ([address] 8/24/2017    Breast CA (HCC)     BILATERAL    Chronic pain     CKD (chronic kidney disease), stage II 8/24/2017    Coagulation disorder ([address] 1984    ITP    Depression     Diabetes ([address]     TYPE 2; NIDDM    DJD (degenerative joint disease), multiple sites 8/24/2017    GI bleed 8/24/2017    Hyperlipemia     Hypertension     Hypertension with renal disease 8/24/2017    IBS (irritable bowel syndrome) 8/24/2017    Myalgia 8/24/2017    On statin therapy 8/24/2017    Overactive bladder 8/24/2017    Pneumonia 04/2015    HOSPITALIZED 3 WEEKS.     Polymyalgia rheumatica ([address] 8/24/2017    Prophylactic antibiotic 8/24/2017    Rosacea      Past Surgical History:   Procedure Laterality Date    BREAST SURGERY PROCEDURE UNLISTED      RECONSTRUCTION X2    HX APPENDECTOMY  1950    HX CATARACT REMOVAL Bilateral     W /IOL    HX GI      COLONOSCOPY    HX HEENT      WISDOM TEETH    HX HYSTERECTOMY  2000S    HX MASTECTOMY  1989    bilateral    HX MASTECTOMY  2001    HX ORTHOPAEDIC      back fusion 2008-LUMBAR    HX TUBAL LIGATION  1981    TOTAL HIP ARTHROPLASTY Left 11/16/2016 ANTERIOR APPROACH, DR Gwendolyn De La Torre; (POSTOP: STANDS ONE INCH TALLER ON LEFT FOOT)     Allergies   Allergen Reactions    Statins-Hmg-Coa Reductase Inhibitors Myalgia     Myalgia with pravachol and multiple statins    Codeine Rash     Rash on thighs    Darvon [Propoxyphene] Other (comments)     \"I see worms\"    Pcn [Penicillins] Rash    Sulfa (Sulfonamide Antibiotics) Rash       REVIEW OF SYSTEMS:  General: negative for - chills or fever, or weight loss or gain  ENT: negative for - headaches, nasal congestion or tinnitus  Eyes: no blurred or visual changes  Neck: No stiffness or swollen nodes  Respiratory: negative for -  hemoptysis, shortness of breath or wheezing. Chronic unchanged cough  Cardiovascular : negative for - chest pain, edema, palpitations or shortness of breath  Gastrointestinal: negative for - abdominal pain, blood in stools, heartburn or nausea/vomiting  Genito-Urinary: no dysuria, trouble voiding, or hematuria  Musculoskeletal: negative for - gait disturbance, joint pain, joint stiffness or joint swelling.   Positive for back pain  Neurological: no TIA or stroke symptoms  Hematologic: no bruises, no bleeding  Lymphatic: no swollen glands  Integument: no lumps, mole changes, nail changes or rash  Endocrine:no malaise/lethargy poly uria or polydipsia or unexpected weight changes        Social History     Social History    Marital status:      Spouse name: N/A    Number of children: N/A    Years of education: N/A     Social History Main Topics    Smoking status: Never Smoker    Smokeless tobacco: Never Used    Alcohol use No    Drug use: No    Sexual activity: No     Other Topics Concern    None     Social History Narrative     Family History   Problem Relation Age of Onset    Heart Disease Mother     Kidney Disease Mother     Lung Disease Mother      COPD    Anesth Problems Neg Hx        OBJECTIVE:     Visit Vitals    /76 (BP 1 Location: Left arm, BP Patient Position: Sitting)    Pulse 89    Temp 98.4 °F (36.9 °C) (Oral)    Resp 16    Ht 5' 4\" (1.626 m)    Wt 189 lb 12.8 oz (86.1 kg)    SpO2 95%    BMI 32.58 kg/m2     CONSTITUTIONAL:   well nourished, appears age appropriate  EYES: sclera anicteric, PERRL, EOMI  ENMT:nares clear, moist mucous membranes, pharynx clear  NECK: supple. Thyroid normal, No JVD or bruits  RESPIRATORY: Chest: clear to ascultation and percussion, normal inspiratory effort  CARDIOVASCULAR: Heart: regular rate and rhythm no murmurs, rubs or gallops, PMI not displaced, No thrills  GASTROINTESTINAL: Abdomen: non distended, soft, non tender, bowel sounds normal  HEMATOLOGIC: no purpura, petechiae or bruising  LYMPHATIC: No lymph node enlargemant  MUSCULOSKELETAL: Extremities: no edema or active synovitis, pulse 1+   INTEGUMENT: No unusual rashes or suspicious skin lesions noted. Nails appear normal.  PERIPHERAL VASCULAR: normal pulses femoral, PT and DP  NEUROLOGIC: non-focal exam, A & O X 3  PSYCHIATRIC:, appropriate affect     ASSESSMENT:   1. Hypertension with renal disease    2. Depression, unspecified depression type    3. Controlled type 2 diabetes mellitus with stage 2 chronic kidney disease, with long-term current use of insulin ([address]    4. Mixed hyperlipidemia    5. CKD (chronic kidney disease), stage II    6. Primary osteoarthritis involving multiple joints    7. Class 1 obesity due to excess calories without serious comorbidity with body mass index (BMI) of 33.0 to 33.9 in adult    8. Non-seasonal allergic rhinitis, unspecified trigger    9. Avascular necrosis of bone of hip, unspecified laterality ([address]    10. Recurrent depression ([address]      Pression  1. Hypertension that is controlled continue current therapy reviewed with her  2. Diabetes repeat status pending reviewed prior labs will make adjustments if necessary. 3.  Hyperlipidemia prior labs reviewed and repeat status pending will make adjustments in medicines if needed.   4. Depression that seems to be stable and I renewed her antidepressant medication  5. CKD stage II repeat status pending  6. DJD that is stable except for the back for which surgery scheduled  7. Obesity we discussed diet exercise weight reduction for [facility]  8. Allergic rhinitis continue Claritin-D and I did renew her Tessalon  9. Prior hip surgery for avascular necrosis she seems to be stable  I will call the lab and make further recommendations adjustments if necessary. Follow stable continue same and follow-up with me in 3 months or sooner should they be a problem. PLAN:  .  Orders Placed This Encounter    METABOLIC PANEL, COMPREHENSIVE    LIPID PANEL    HEMOGLOBIN A1C WITH EAG    CK    buPROPion XL (WELLBUTRIN XL) 150 mg tablet    sertraline (ZOLOFT) 100 mg tablet    benzonatate (TESSALON) 200 mg capsule         ATTENTION:   This medical record was transcribed using an electronic medical records system. Although proofread, it may and can contain electronic and spelling errors. Other human spelling and other errors may be present. Corrections may be executed at a later time. Please feel free to contact us for any clarifications as needed. Follow-up Disposition:  Return in about 3 months (around 10/6/2018). No results found for any visits on 07/06/18. Christa Puga MD    The patient verbalized understanding of the problems and plans as explained.

## 2018-07-06 NOTE — PATIENT INSTRUCTIONS
Arthritis: Care Instructions  Your Care Instructions  Arthritis, also called osteoarthritis, is a breakdown of the cartilage that cushions your joints. When the cartilage wears down, your bones rub against each other. This causes pain and stiffness. Many people have some arthritis as they age. Arthritis most often affects the joints of the spine, hands, hips, knees, or feet. You can take simple measures to protect your joints, ease your pain, and help you stay active. Follow-up care is a key part of your treatment and safety. Be sure to make and go to all appointments, and call your doctor if you are having problems. It's also a good idea to know your test results and keep a list of the medicines you take. How can you care for yourself at home? · Stay at a healthy weight. Being overweight puts extra strain on your joints. · Talk to your doctor or physical therapist about exercises that will help ease joint pain. ¨ Stretch. You may enjoy gentle forms of yoga to help keep your joints and muscles flexible. ¨ Walk instead of jog. Other types of exercise that are less stressful on the joints include riding a bicycle, swimming, bebo chi, or water exercise. ¨ Lift weights. Strong muscles help reduce stress on your joints. Stronger thigh muscles, for example, take some of the stress off of the knees and hips. Learn the right way to lift weights so you do not make joint pain worse. · Take your medicines exactly as prescribed. Call your doctor if you think you are having a problem with your medicine. · Take pain medicines exactly as directed. ¨ If the doctor gave you a prescription medicine for pain, take it as prescribed. ¨ If you are not taking a prescription pain medicine, ask your doctor if you can take an over-the-counter medicine. · Use a cane, crutch, walker, or another device if you need help to get around. These can help rest your joints.  You also can use other things to make life easier, such as a higher toilet seat and padded handles on kitchen utensils. · Do not sit in low chairs, which can make it hard to get up. · Put heat or cold on your sore joints as needed. Use whichever helps you most. You also can take turns with hot and cold packs. ¨ Apply heat 2 or 3 times a day for 20 to 30 minutes-using a heating pad, hot shower, or hot pack-to relieve pain and stiffness. ¨ Put ice or a cold pack on your sore joint for 10 to 20 minutes at a time. Put a thin cloth between the ice and your skin. When should you call for help? Call your doctor now or seek immediate medical care if:  ? · You have sudden swelling, warmth, or pain in any joint. ? · You have joint pain and a fever or rash. ? · You have such bad pain that you cannot use a joint. ? Watch closely for changes in your health, and be sure to contact your doctor if:  ? · You have mild joint symptoms that continue even with more than 6 weeks of care at home. ? · You have stomach pain or other problems with your medicine. Where can you learn more? Go to http://malia-lincoln.info/. Enter U194 in the search box to learn more about \"Arthritis: Care Instructions. \"  Current as of: October 31, 2016  Content Version: 11.4  © 6731-8091 [email or web address]. Care instructions adapted under license by [employer] (which disclaims liability or warranty for this information). If you have questions about a medical condition or this instruction, always ask your healthcare professional. [place] [address] any warranty or liability for your use of this information.

## 2018-07-06 NOTE — PROGRESS NOTES
Chief Complaint   Patient presents with    Pre-op Exam     lumbar fusion scheduled for 7/17/18       1. Have you been to the ER, urgent care clinic since your last visit? Hospitalized since your last visit? no    2. Have you seen or consulted any other health care providers outside of the [facility] since your last visit? Include any pap smears or colon screening.   no

## 2018-07-07 LAB
ALBUMIN SERPL-MCNC: 4.5 G/DL (ref 3.5–4.8)
ALBUMIN/GLOB SERPL: 1.7 {RATIO} (ref 1.2–2.2)
ALP SERPL-CCNC: 95 IU/L (ref 39–117)
ALT SERPL-CCNC: 76 IU/L (ref 0–32)
AST SERPL-CCNC: 81 IU/L (ref 0–40)
BILIRUB SERPL-MCNC: 0.5 MG/DL (ref 0–1.2)
BUN SERPL-MCNC: 17 MG/DL (ref 8–27)
BUN/CREAT SERPL: 21 (ref 12–28)
CALCIUM SERPL-MCNC: 9.7 MG/DL (ref 8.7–10.3)
CHLORIDE SERPL-SCNC: 102 MMOL/L (ref 96–106)
CHOLEST SERPL-MCNC: 204 MG/DL (ref 100–199)
CK SERPL-CCNC: 91 U/L (ref 24–173)
CO2 SERPL-SCNC: 21 MMOL/L (ref 20–29)
CREAT SERPL-MCNC: 0.8 MG/DL (ref 0.57–1)
EST. AVERAGE GLUCOSE BLD GHB EST-MCNC: 174 MG/DL
GLOBULIN SER CALC-MCNC: 2.7 G/DL (ref 1.5–4.5)
GLUCOSE SERPL-MCNC: 154 MG/DL (ref 65–99)
HBA1C MFR BLD: 7.7 % (ref 4.8–5.6)
HDLC SERPL-MCNC: 44 MG/DL
LDLC SERPL CALC-MCNC: 119 MG/DL (ref 0–99)
POTASSIUM SERPL-SCNC: 3.9 MMOL/L (ref 3.5–5.2)
PROT SERPL-MCNC: 7.2 G/DL (ref 6–8.5)
SODIUM SERPL-SCNC: 145 MMOL/L (ref 134–144)
TRIGL SERPL-MCNC: 207 MG/DL (ref 0–149)
VLDLC SERPL CALC-MCNC: 41 MG/DL (ref 5–40)

## 2018-07-09 ENCOUNTER — HOSPITAL ENCOUNTER (OUTPATIENT)
Dept: PREADMISSION TESTING | Age: 73
Discharge: HOME | End: 2018-07-09
Payer: COMMERCIAL

## 2018-07-09 VITALS
HEIGHT: 64 IN | HEART RATE: 82 BPM | TEMPERATURE: 98.5 F | SYSTOLIC BLOOD PRESSURE: 130 MMHG | WEIGHT: 191 LBS | BODY MASS INDEX: 32.61 KG/M2 | DIASTOLIC BLOOD PRESSURE: 84 MMHG

## 2018-07-09 LAB
APPEARANCE UR: ABNORMAL
BACTERIA URNS QL MICRO: NEGATIVE /HPF
BILIRUB UR QL CFM: NEGATIVE
CAOX CRY URNS QL MICRO: ABNORMAL
COLOR UR: ABNORMAL
EPITH CASTS URNS QL MICRO: ABNORMAL /LPF
ERYTHROCYTE [DISTWIDTH] IN BLOOD BY AUTOMATED COUNT: 12.8 % (ref 11.5–14.5)
GLUCOSE UR STRIP.AUTO-MCNC: 250 MG/DL
HCT VFR BLD AUTO: 41.1 % (ref 35–47)
HGB BLD-MCNC: 13.9 G/DL (ref 11.5–16)
HGB UR QL STRIP: NEGATIVE
INR PPP: 1 (ref 0.9–1.1)
KETONES UR QL STRIP.AUTO: ABNORMAL MG/DL
LEUKOCYTE ESTERASE UR QL STRIP.AUTO: ABNORMAL
MCH RBC QN AUTO: 34.1 PG (ref 26–34)
MCHC RBC AUTO-ENTMCNC: 33.8 G/DL (ref 30–36.5)
MCV RBC AUTO: 100.7 FL (ref 80–99)
NITRITE UR QL STRIP.AUTO: NEGATIVE
NRBC # BLD: 0 K/UL (ref 0–0.01)
NRBC BLD-RTO: 0 PER 100 WBC
PH UR STRIP: 6 [PH] (ref 5–8)
PLATELET # BLD AUTO: 222 K/UL (ref 150–400)
PMV BLD AUTO: 10.5 FL (ref 8.9–12.9)
PROT UR STRIP-MCNC: 30 MG/DL
PROTHROMBIN TIME: 10.4 SEC (ref 9–11.1)
RBC # BLD AUTO: 4.08 M/UL (ref 3.8–5.2)
RBC #/AREA URNS HPF: ABNORMAL /HPF (ref 0–5)
SP GR UR REFRACTOMETRY: >1.03 (ref 1–1.03)
UA: UC IF INDICATED,UAUC: ABNORMAL
UROBILINOGEN UR QL STRIP.AUTO: 1 EU/DL (ref 0.2–1)
WBC # BLD AUTO: 5.7 K/UL (ref 3.6–11)
WBC URNS QL MICRO: ABNORMAL /HPF (ref 0–4)

## 2018-07-09 PROCEDURE — 36415 COLL VENOUS BLD VENIPUNCTURE: CPT | Performed by: ORTHOPAEDIC SURGERY

## 2018-07-09 PROCEDURE — 85610 PROTHROMBIN TIME: CPT | Performed by: ORTHOPAEDIC SURGERY

## 2018-07-09 PROCEDURE — 85027 COMPLETE CBC AUTOMATED: CPT | Performed by: ORTHOPAEDIC SURGERY

## 2018-07-09 PROCEDURE — 86900 BLOOD TYPING SEROLOGIC ABO: CPT | Performed by: ORTHOPAEDIC SURGERY

## 2018-07-09 PROCEDURE — 93005 ELECTROCARDIOGRAM TRACING: CPT

## 2018-07-09 PROCEDURE — 81001 URINALYSIS AUTO W/SCOPE: CPT | Performed by: ORTHOPAEDIC SURGERY

## 2018-07-10 LAB
ATRIAL RATE: 75 BPM
BACTERIA SPEC CULT: NORMAL
BACTERIA SPEC CULT: NORMAL
CALCULATED P AXIS, ECG09: 42 DEGREES
CALCULATED R AXIS, ECG10: -27 DEGREES
CALCULATED T AXIS, ECG11: 50 DEGREES
DIAGNOSIS, 93000: NORMAL
P-R INTERVAL, ECG05: 184 MS
Q-T INTERVAL, ECG07: 434 MS
QRS DURATION, ECG06: 92 MS
QTC CALCULATION (BEZET), ECG08: 484 MS
SERVICE CMNT-IMP: NORMAL
VENTRICULAR RATE, ECG03: 75 BPM

## 2018-07-10 NOTE — PERIOP NOTES
HGB A1C 7.7; PER ORTHO PROTOCOL, REFERRAL TO [facility] INIT[facility]ATED. FAXED AND SPOKE WITH A[facility]UTOS[facility]--DR MONTOYA. RE: Stan Gudino. PREOP LABS.

## 2018-07-10 NOTE — PROGRESS NOTES
Your labs are okay except for the diabetes is not as well controlled so I would increase the metformin from 500 twice a day to 500 in the morning thousand in the evening along watching diet more closely. Your HDL cholesterol still a little low that being the protective cholesterol I would suggest working on increasing aerobic exercise along with weight reduction for that also adding additional fiber in the diet and 2 additional fish oil tablets would help that.

## 2018-07-11 ENCOUNTER — OFFICE VISIT (OUTPATIENT)
Dept: INTERNAL MEDICINE CLINIC | Age: 73
End: 2018-07-11

## 2018-07-11 ENCOUNTER — TELEPHONE (OUTPATIENT)
Dept: INTERNAL MEDICINE CLINIC | Age: 73
End: 2018-07-11

## 2018-07-11 VITALS
SYSTOLIC BLOOD PRESSURE: 122 MMHG | HEART RATE: 89 BPM | BODY MASS INDEX: 32.88 KG/M2 | RESPIRATION RATE: 20 BRPM | HEIGHT: 64 IN | WEIGHT: 192.6 LBS | OXYGEN SATURATION: 95 % | DIASTOLIC BLOOD PRESSURE: 86 MMHG

## 2018-07-11 DIAGNOSIS — M15.9 PRIMARY OSTEOARTHRITIS INVOLVING MULTIPLE JOINTS: ICD-10-CM

## 2018-07-11 DIAGNOSIS — M51.9 LUMBAR DISC DISEASE: Primary | ICD-10-CM

## 2018-07-11 DIAGNOSIS — Z01.810 PRE-OPERATIVE CARDIOVASCULAR EXAMINATION: ICD-10-CM

## 2018-07-11 DIAGNOSIS — E78.2 MIXED HYPERLIPIDEMIA: ICD-10-CM

## 2018-07-11 DIAGNOSIS — E66.09 CLASS 1 OBESITY DUE TO EXCESS CALORIES WITHOUT SERIOUS COMORBIDITY WITH BODY MASS INDEX (BMI) OF 33.0 TO 33.9 IN ADULT: ICD-10-CM

## 2018-07-11 DIAGNOSIS — E11.22 CONTROLLED TYPE 2 DIABETES MELLITUS WITH STAGE 2 CHRONIC KIDNEY DISEASE, WITH LONG-TERM CURRENT USE OF INSULIN (HCC): ICD-10-CM

## 2018-07-11 DIAGNOSIS — N18.2 CKD (CHRONIC KIDNEY DISEASE), STAGE II: ICD-10-CM

## 2018-07-11 DIAGNOSIS — I12.9 HYPERTENSION WITH RENAL DISEASE: ICD-10-CM

## 2018-07-11 DIAGNOSIS — N18.2 CONTROLLED TYPE 2 DIABETES MELLITUS WITH STAGE 2 CHRONIC KIDNEY DISEASE, WITH LONG-TERM CURRENT USE OF INSULIN (HCC): ICD-10-CM

## 2018-07-11 DIAGNOSIS — Z79.4 CONTROLLED TYPE 2 DIABETES MELLITUS WITH STAGE 2 CHRONIC KIDNEY DISEASE, WITH LONG-TERM CURRENT USE OF INSULIN (HCC): ICD-10-CM

## 2018-07-11 RX ORDER — METFORMIN HYDROCHLORIDE 500 MG/1
TABLET, EXTENDED RELEASE ORAL
Qty: 270 TAB | Refills: 3 | Status: SHIPPED | OUTPATIENT
Start: 2018-07-11 | End: 2019-04-17 | Stop reason: SDUPTHER

## 2018-07-11 NOTE — PATIENT INSTRUCTIONS
Arthritis: Care Instructions Your Care Instructions Arthritis, also called osteoarthritis, is a breakdown of the cartilage that cushions your joints. When the cartilage wears down, your bones rub against each other. This causes pain and stiffness. Many people have some arthritis as they age. Arthritis most often affects the joints of the spine, hands, hips, knees, or feet. You can take simple measures to protect your joints, ease your pain, and help you stay active. Follow-up care is a key part of your treatment and safety. Be sure to make and go to all appointments, and call your doctor if you are having problems. It's also a good idea to know your test results and keep a list of the medicines you take. How can you care for yourself at home? · Stay at a healthy weight. Being overweight puts extra strain on your joints. · Talk to your doctor or physical therapist about exercises that will help ease joint pain. ¨ Stretch. You may enjoy gentle forms of yoga to help keep your joints and muscles flexible. ¨ Walk instead of jog. Other types of exercise that are less stressful on the joints include riding a bicycle, swimming, bebo chi, or water exercise. ¨ Lift weights. Strong muscles help reduce stress on your joints. Stronger thigh muscles, for example, take some of the stress off of the knees and hips. Learn the right way to lift weights so you do not make joint pain worse. · Take your medicines exactly as prescribed. Call your doctor if you think you are having a problem with your medicine. · Take pain medicines exactly as directed. ¨ If the doctor gave you a prescription medicine for pain, take it as prescribed. ¨ If you are not taking a prescription pain medicine, ask your doctor if you can take an over-the-counter medicine. · Use a cane, crutch, walker, or another device if you need help to get around. These can help rest your joints.  You also can use other things to make life easier, such as a higher toilet seat and padded handles on kitchen utensils. · Do not sit in low chairs, which can make it hard to get up. · Put heat or cold on your sore joints as needed. Use whichever helps you most. You also can take turns with hot and cold packs. ¨ Apply heat 2 or 3 times a day for 20 to 30 minutes-using a heating pad, hot shower, or hot pack-to relieve pain and stiffness. ¨ Put ice or a cold pack on your sore joint for 10 to 20 minutes at a time. Put a thin cloth between the ice and your skin. When should you call for help? Call your doctor now or seek immediate medical care if: 
  · You have sudden swelling, warmth, or pain in any joint.  
  · You have joint pain and a fever or rash.  
  · You have such bad pain that you cannot use a joint.  
 Watch closely for changes in your health, and be sure to contact your doctor if: 
  · You have mild joint symptoms that continue even with more than 6 weeks of care at home.  
  · You have stomach pain or other problems with your medicine. Where can you learn more? Go to http://malia-lincoln.info/. Enter Y437 in the search box to learn more about \"Arthritis: Care Instructions. \" Current as of: October 10, 2017 Content Version: 11.7 © 6251-8895 [employer]. Care instructions adapted under license by [employer] (which disclaims liability or warranty for this information). If you have questions about a medical condition or this instruction, always ask your healthcare professional. [place] [address] any warranty or liability for your use of this information.

## 2018-07-11 NOTE — PROGRESS NOTES
1. Have you been to the ER, urgent care clinic since your last visit? Hospitalized since your last visit? No    2. Have you seen or consulted any other health care providers outside of the [address] [place] [address] since your last visit? Include any pap smears or colon screening.  No     Chief Complaint   Patient presents with    Pre-op Exam     Lumbar fusion 07-

## 2018-07-11 NOTE — MR AVS SNAPSHOT
[address] [facility] [place] 
 
 
 [address] [address] [phone] Patient: Dale Jennings MRN: [id number] QDE:3/17/1977 Visit Information Date & Time Provider Department Dept. Phone Encounter #  
 7/11/2018  2:30 PM Baljeet Castro, [facility] [phone] [id number] Your Appointments 10/15/2018  9:50 AM  
FOLLOW UP 10 with MD ADRIANA Coe [facility] ([address]) Appt Note: [address] [address] [address] [facility] [address] Upcoming Health Maintenance Date Due DTaP/Tdap/Td series (1 - Tdap) 1/15/1968 ZOSTER VACCINE AGE 60> 11/15/2006 EYE EXAM RETINAL OR DILATED Q1 5/15/2018 Influenza Age 5 to Adult 8/1/2018 MICROALBUMIN Q1 10/2/2018 HEMOGLOBIN A1C Q6M 1/6/2019 MEDICARE YEARLY EXAM 1/16/2019 FOOT EXAM Q1 4/9/2019 GLAUCOMA SCREENING Q2Y 5/15/2019 LIPID PANEL Q1 7/6/2019 BREAST CANCER SCRN MAMMOGRAM 10/2/2019 COLONOSCOPY 10/3/2026 Allergies as of 7/11/2018  Review Complete On: 7/11/2018 By: Baljeet Castro MD  
  
 Severity Noted Reaction Type Reactions Statins-hmg-coa Reductase Inhibitors High 11/20/2016    Myalgia Myalgia with pravachol and multiple statins Codeine  04/20/2015    Rash Rash on thighs Darvon [Propoxyphene]  04/20/2015    Other (comments) \"I see worms\" Pcn [Penicillins]  04/20/2015    Rash  
 Sulfa (Sulfonamide Antibiotics)  04/20/2015    Rash Current Immunizations  Reviewed on 5/6/2015 Name Date Influenza High Dose Vaccine PF 10/2/2017 Influenza Vaccine 11/2/2015 Pneumococcal Conjugate (PCV-13) 7/27/2015 Pneumococcal Vaccine (Unspecified Type) 1/1/2013, 12/23/2011 Not reviewed this visit You Were Diagnosed With   
  
 Codes Comments Lumbar disc disease    -  Primary ICD-10-CM: M51.9 ICD-9-CM: 722.93 Pre-operative cardiovascular examination     ICD-10-CM: Z01.810 ICD-9-CM: V72.81 Hypertension with renal disease     ICD-10-CM: I12.9 ICD-9-CM: 403.90 Controlled type 2 diabetes mellitus with stage 2 chronic kidney disease, with long-term current use of insulin (HCC)     ICD-10-CM: E11.22, N18.2, Z79.4 ICD-9-CM: 250.40, 585.2, V58.67 CKD (chronic kidney disease), stage II     ICD-10-CM: N18.2 ICD-9-CM: 173. 2 Mixed hyperlipidemia     ICD-10-CM: E78.2 ICD-9-CM: 272.2 Primary osteoarthritis involving multiple joints     ICD-10-CM: M15.0 ICD-9-CM: 715.09 Class 1 obesity due to excess calories without serious comorbidity with body mass index (BMI) of 33.0 to 33.9 in adult     ICD-10-CM: E66.09, Z68.33 
ICD-9-CM: 278.00, V85.33 Vitals BP Pulse Resp Height(growth percentile) Weight(growth percentile) SpO2  
 122/86 (BP 1 Location: Left arm, BP Patient Position: Sitting) 89 20 5' 4\" (1.626 m) 192 lb 9.6 oz (87.4 kg) 95% BMI OB Status Smoking Status 33.06 kg/m2 Hysterectomy Never Smoker Vitals History BMI and BSA Data Body Mass Index Body Surface Area 33.06 kg/m 2 1.99 m 2 Preferred Pharmacy Pharmacy Name Phone [facility] [phone] Your Updated Medication List  
  
   
This list is accurate as of 7/11/18  3:08 PM.  Always use your most recent med list. ALLEGRA-D 24 HOUR 180-240 mg per tablet Generic drug:  fexofenadine-pseudoephedrine Take 1 Tab by mouth daily as needed. benzonatate 200 mg capsule Commonly known as:  TESSALON Take 1 Cap by mouth three (3) times daily as needed for Cough for up to 7 days. buPROPion  mg tablet Commonly known as:  WELLBUTRIN XL  
TAKE 1 TABLET BY MOUTH EVERY DAY  
  
 clindamycin 1 % topical gel Commonly known as:  CLINDAGEL Apply  to affected area two (2) times a day. use thin film on affected area COREG 6.25 mg tablet Generic drug:  carvedilol Take 3.125 mg by mouth daily. diclofenac EC 75 mg EC tablet Commonly known as:  VOLTAREN Take 1 Tab by mouth two (2) times a day. ELOCON 0.1 % topical cream  
Generic drug:  mometasone Apply  to affected area daily as needed. FINACEA 15 % topical gel Generic drug:  azelaic acid Apply  to affected area two (2) times a day. hydroCHLOROthiazide 25 mg tablet Commonly known as:  HYDRODIURIL  
TAKE 1 TABLET BY MOUTH DAILY  
  
 levalbuterol 1.25 mg/0.5 mL Nebu Commonly known as:  XOPENEX  
1.25 mg by Nebulization route as needed. metFORMIN  mg tablet Commonly known as:  GLUCOPHAGE XR Take one tablet in the morning with breakfast and one tablet in the evening with dinner. multivitamin tablet Commonly known as:  ONE A DAY Take 1 Tab by mouth daily. oxazepam 15 mg capsule Commonly known as:  SERAX TAKE 2 CAPSULES AT BEDTIME  
  
 pravastatin 80 mg tablet Commonly known as:  PRAVACHOL Take 1 Tab by mouth nightly. sertraline 100 mg tablet Commonly known as:  ZOLOFT  
TAKE 1 TABLET BY MOUTH DAILY VENTOLIN HFA 90 mcg/actuation inhaler Generic drug:  albuterol Take  by inhalation every four (4) hours as needed. Introducing [facility]! [employer] introduces [employer] patient portal. Now you can access parts of your medical record, email your doctor's office, and request medication refills online. 1. In your internet browser, go to [email or web address] 2. Click on the First Time User? Click Here link in the Sign In box. You will see the New Member Sign Up page. 3. Enter your [employer] Access Code exactly as it appears below. You will not need to use this code after youve completed the sign-up process.  If you do not sign up before the expiration date, you must request a new code. · [employer] Access Code: [email or web address] Expires: 10/7/2018 12:04 PM 
 
4. Enter the last four digits of your Social Security Number (xxxx) and Date of Birth (mm/dd/yyyy) as indicated and click Submit. You will be taken to the next sign-up page. 5. Create a [employer] ID. This will be your [employer] login ID and cannot be changed, so think of one that is secure and easy to remember. 6. Create a [employer] password. You can change your password at any time. 7. Enter your Password Reset Question and Answer. This can be used at a later time if you forget your password. 8. Enter your e-mail address. You will receive e-mail notification when new information is available in [address]. 9. Click Sign Up. You can now view and download portions of your medical record. 10. Click the Download Summary menu link to download a portable copy of your medical information. If you have questions, please visit the Frequently Asked Questions section of the [employer] website. Remember, [employer] is NOT to be used for urgent needs. For medical emergencies, dial 911. Now available from your iPhone and Android! Please provide this summary of care documentation to your next provider. Your primary care clinician is listed as Rishabh. If you have any questions after today's visit, please call [phone].

## 2018-07-11 NOTE — TELEPHONE ENCOUNTER
----- Message from Baljeet Castro MD sent at 7/10/2018  6:44 PM EDT -----  Your labs are okay except for the diabetes is not as well controlled so I would increase the metformin from 500 twice a day to 500 in the morning thousand in the evening along watching diet more closely. Your HDL cholesterol still a little low that being the protective cholesterol I would suggest working on increasing aerobic exercise along with weight reduction for that also adding additional fiber in the diet and 2 additional fish oil tablets would help that.

## 2018-07-11 NOTE — TELEPHONE ENCOUNTER
RX refill request from the patient/pharmacy. Patient last seen 07- with labs, and next appt. scheduled for 10-.

## 2018-07-11 NOTE — PROGRESS NOTES
Pre-op Exam (Lumbar fusion 07-)       HPI:     Antonio Garcia is a 72y.o. year old female who is here for preoperative medical consultation clearance prior to lumbar fusion to be done because of lumbar disc disease in the upper lumbar region. This surgery is to be done at [facility] by Dr. Ginny Schrader on 7/17/2018. She currently is followed by me regularly for hypertension, diabetes, hyperlipidemia, DJD, obesity and other medical problems. She currently denies any chest pain, shortness breath, palpitations or cardiorespiratory complaints. She denies any GI or  complaints. She denies any headaches, dizziness or neurologic complaints. She does have some chronic back pain related to the problem for which surgery scheduled but has no other particular joint aches and pains. She has no other complaints on complete review of systems. Visit Vitals    /86 (BP 1 Location: Left arm, BP Patient Position: Sitting)    Pulse 89    Resp 20    Ht 5' 4\" (1.626 m)    Wt 192 lb 9.6 oz (87.4 kg)    SpO2 95%    BMI 33.06 kg/m2       Past Medical History:   Diagnosis Date    Abnormal LFTs 08/24/2017    FATTY LIVER    Arthritis     OSTEO    Avascular necrosis of hip ([address] 08/24/2017    BILAT HIPS    Breast CA (HCC)     BILATERAL; SURGERY, CHEMO    Chronic pain     CKD (chronic kidney disease), stage II 8/24/2017    Coagulation disorder ([address] 1984    ITP  (DR CHU BRADSHAW)    Depression     Diabetes ([address]     TYPE 2; NIDDM    DJD (degenerative joint disease), multiple sites 8/24/2017    GI bleed 2008    HEMORRHOIDS    Hyperlipemia     Hypertension with renal disease 8/24/2017    IBS (irritable bowel syndrome) 8/24/2017    Myalgia 8/24/2017    On statin therapy 8/24/2017    Overactive bladder 8/24/2017    Pneumonia 04/2015    HOSPITALIZED 3 WEEKS.     Polymyalgia rheumatica ([address] 8/24/2017    Prophylactic antibiotic 8/24/2017    Rosacea        Past Surgical History: Procedure Laterality Date    BREAST SURGERY PROCEDURE UNLISTED      RECONSTRUCTION X2    HX APPENDECTOMY  1950    HX CATARACT REMOVAL Bilateral     W /IOL    HX GI      COLONOSCOPY    HX HEENT      WISDOM TEETH    HX HYSTERECTOMY  2000S    HX MASTECTOMY  1989    bilateral    HX MASTECTOMY  2001    HX ORTHOPAEDIC      back fusion 2008-LUMBAR    HX TUBAL LIGATION  1981    TOTAL HIP ARTHROPLASTY Left 11/16/2016    ANTERIOR APPROACH, DR Gwendolyn De La Torre; (POSTOP: STANDS ONE INCH TALLER ON LEFT FOOT)    TOTAL HIP ARTHROPLASTY Right 12/2016    ANTERIOR APPROACH (DR Gwendolyn De La Torre)       Prior to Admission medications    Medication Sig Start Date End Date Taking? Authorizing Provider   buPROPion XL (WELLBUTRIN XL) 150 mg tablet TAKE 1 TABLET BY MOUTH EVERY DAY 7/6/18  Yes Margie Turner MD   sertraline (ZOLOFT) 100 mg tablet TAKE 1 TABLET BY MOUTH DAILY 7/6/18  Yes Margie Turner MD   benzonatate (TESSALON) 200 mg capsule Take 1 Cap by mouth three (3) times daily as needed for Cough for up to 7 days. 7/6/18 7/13/18 Yes Margie Turner MD   oxazepam (SERAX) 15 mg capsule TAKE 2 CAPSULES AT BEDTIME 4/23/18  Yes Margie Turner MD   fexofenadine-pseudoephedrine (ALLEGRA-D 24 HOUR) 180-240 mg per tablet Take 1 Tab by mouth daily as needed. Yes Historical Provider   metFORMIN ER (GLUCOPHAGE XR) 500 mg tablet Take one tablet in the morning with breakfast and one tablet in the evening with dinner. 1/22/18  Yes Margie Turner MD   pravastatin (PRAVACHOL) 80 mg tablet Take 1 Tab by mouth nightly. 1/22/18  Yes Margie Turner MD   hydroCHLOROthiazide (HYDRODIURIL) 25 mg tablet TAKE 1 TABLET BY MOUTH DAILY 10/31/17  Yes Margie Turner MD   albuterol (VENTOLIN HFA) 90 mcg/actuation inhaler Take  by inhalation every four (4) hours as needed. Yes Historical Provider   levalbuterol (XOPENEX) 1.25 mg/0.5 mL nebu 1.25 mg by Nebulization route as needed.    Yes Historical Provider   multivitamin (ONE A DAY) tablet Take 1 Tab by mouth daily. Yes Historical Provider   mometasone (ELOCON) 0.1 % topical cream Apply  to affected area daily as needed. Yes Historical Provider   azelaic acid (FINACEA) 15 % topical gel Apply  to affected area two (2) times a day. Yes Historical Provider   diclofenac EC (VOLTAREN) 75 mg EC tablet Take 1 Tab by mouth two (2) times a day. 7/31/17  Yes Anitra Martin MD   carvedilol (COREG) 6.25 mg tablet Take 3.125 mg by mouth daily. Yes Historical Provider   clindamycin (CLINDAGEL) 1 % topical gel Apply  to affected area two (2) times a day. use thin film on affected area   Yes Historical Provider        Allergies   Allergen Reactions    Statins-Hmg-Coa Reductase Inhibitors Myalgia     Myalgia with pravachol and multiple statins    Codeine Rash     Rash on thighs    Darvon [Propoxyphene] Other (comments)     \"I see worms\"    Pcn [Penicillins] Rash    Sulfa (Sulfonamide Antibiotics) Rash        Family History   Problem Relation Age of Onset    Heart Disease Mother     Kidney Disease Mother     Lung Disease Mother      COPD    Anesth Problems Neg Hx        Social History     Social History    Marital status:      Spouse name: N/A    Number of children: N/A    Years of education: N/A     Occupational History    Not on file. Social History Main Topics    Smoking status: Never Smoker    Smokeless tobacco: Never Used    Alcohol use No    Drug use: No    Sexual activity: No     Other Topics Concern    Not on file     Social History Narrative        ROS:     Constitutional: She denies fevers, weight loss, sweats. Eyes: No blurred or double vision. ENT: No difficulty with swallowing, taste, speech or smell. NECK: no stiffness swelling or lymph node enlargement  Respiratory: No cough wheezing or shortness of breath. Cardiovascular: Denies chest pain, palpitations, unexplained indigestion or syncope.   Breast: She has noted no masses or lumps and no discharge or axillary swelling  Gastrointestinal:  No changes in bowel movements, no abdominal pain, no bloating. Genitourinary: No discharge or abnormal bleeding or pain  Extremities: No joint pain, stiffness or swelling. Positive for upper lumbar pain  Neurological:  No numbness, tingling, burring paresthesias or loss of motor strength. No syncope, dizziness or frequent headache  Skin:  No recent rashes or mole changes. Psychiatric/Behavioral:  Negative for depression. The patient is not nervous/anxious. HEMATOLOGIC: no easy bruising or bleeding gums  Endocrine: no sweats of urinary frequency or excessive thirst      Physical Examination:     Vitals:    07/11/18 1448   BP: 122/86   Pulse: 89   Resp: 20   SpO2: 95%   Weight: 192 lb 9.6 oz (87.4 kg)   Height: 5' 4\" (1.626 m)   PainSc:   0 - No pain        General appearance - alert, well appearing, and in no distress  Mental status - alert, oriented to person, place, and time  HEENT:  Ears - bilateral TM's and external ear canals clear  Eyes - pupillary responses were normal.  Extraocular muscle function intact. Lids and conjunctiva not injected. Fundoscopic exam revealed sharp disc margins. eye movements intact  Pharynx- clear with teeth in good repair. No masses were noted  Neck - supple without thyromegaly or burit. No JVD noted  Lungs - clear to auscultation and percussion  Cardiac- normal rate, regular rhythm without murmurs. PMI not displaced. No gallop, rub or click  Abdomen - flat, soft, non-tender without palpable organomegaly or mass. No pulsatile mass was felt, and not bruit was heard. Bowel sounds were active  Extremities -  no clubbing cyanosis or edema  Lymphatics - no palpable lymphadenopathy, no hepatosplenomegaly  Peripheral vascular - Dorsalis pedis and posterior tibial pulses felt without difficulty  Skin - no rash or unusual mole change noted  Neurological - Cranial nerves II-XII grossly intact. Motor strength 5/5. DTR's 2+ and symmetric. Station and gait normal  Back exam - full range of motion, no tenderness, palpable spasm or pain on motion  Musculoskeletal - no joint tenderness, deformity or swelling  Hematologic: no purpura, petechiae or bruising    Assessment/Plan:     1. Lumbar disc disease    2. Pre-operative cardiovascular examination    3. Hypertension with renal disease    4. Controlled type 2 diabetes mellitus with stage 2 chronic kidney disease, with long-term current use of insulin ([address]    5. CKD (chronic kidney disease), stage II    6. Mixed hyperlipidemia    7. Primary osteoarthritis involving multiple joints    8. Class 1 obesity due to excess calories without serious comorbidity with body mass index (BMI) of 33.0 to 33.9 in adult      Impression  1. Preoperative cardiovascular examination completed today. 2.  Lumbar disc disease for lumbar fusion as scheduled  3. Hypertension that is control  4. Diabetes I reviewed her last A1c done on July 6 which was a little higher than ideal at 7.7 but not enough to complicate surgery  5. CKD that is stage II and stable  6. Hyperlipidemia I reviewed those numbers done on July 6 again not optimal but not enough to contraindicate surgery  7. DJD  8. Obesity hopefully this will allow her to become more mobile and get some weight off which will help for long-term health benefit. She is medically stable for the surgery. Labs done at [facility] on July 9 along with the EKG are reviewed as well as labs done by me on July 6 at the office. Copy to Dr. Ginny Schrader. No orders of the defined types were placed in this encounter. No results found for any visits on 07/11/18. I have reviewed the patient's medical history in detail and updated the computerized patient record. We had a prolonged discussion about these complex clinical issues and went over the various important aspects to consider. All questions were answered.      Advised her to call back or return to office if symptoms do not improve, change in nature, or persist.    She was given an after visit summary or informed of [employer] which includes patient instructions, diagnoses, current medications, & vitals. She expressed understanding with the diagnosis and plan.       Syeda Villasenor MD

## 2018-07-13 NOTE — H&P
[facility]  Location: [place] [address] [address]  Patient #: [id number]  : 1945   / Language: Joi Archer / Race: White  Female      History of Present Illness  The patient is a 72year old female who presents for a Recheck of Chronic lumbar back pain. This condition occurred without any known injury. The last clinic visit was 1 month(s) ago. Management changes made at the last visit include ordering test(s) (MRI). Symptoms include pain, stiffness and decreased range of motion. Symptoms are located in the low back, in the right low back and on the right side more than the left. The pain radiates to the right buttock and right lower leg. The patient describes the pain as aching. Onset was gradual. The symptoms occur intermittently. The patient describes symptoms as severe and worsening. Symptoms are exacerbated by weight bearing, back motion, standing, sitting, lifting, bending and supine position. Symptoms are relieved by rest. Associated symptoms include leg weakness. Current treatment includes nonsteroidal anti-inflammatory drugs (Voltaren gel). Recently the disease has been worsening. The patient has a surgical history of back surgery and hip surgery (bilateral hip replacements). The patient was previously evaluated in this clinic 4 week(s) ago. Past evaluation has included x-ray of the lumbar spine and MRI of the lumbar spine. Past treatment has included back surgery.       Problem List/Past Medical   Essential Hypertension    Osteoarthritis of one hip, right (715.95  M16.11)    Primary osteoarthritis of both hips (715.15  M16.0)    LOW BACK PAIN (724.2) (724.2  M54.5)    Lumbar stenosis with neurogenic claudication (724.03  M48.062)    SCIATICA (724.3  M54.30)    DIETARY COUNSELING (V65.3)    Rotator cuff disorder, right (726.10  M67.911)    Status post hip replacement (V43.64  Z96.649)    DDD (722.52) (722.52  M51.36)    Glenohumeral arthritis, right (715.91  M19.011)    SCOLIOSIS, IDIOPATHIC (737.30)    REVIEW OF SYSTEMS: Systems were reviewed by the provider.    Weight above 97th percentile (V49.89  Z78.9)    S/P lumbar fusion (V45.4  Z98.1)      Allergies   Sulfa Drugs    Penicillins    Darvocet-N 100 *ANALGESICS - OPIOID*      Family History   Kidney disease    Hypercholesterolemia    Hypertension      Social History   Alcohol use: Drinks 3 times per year. Tobacco / smoke exposure   10/25/2013: No  Caffeine use: Drinks tea 5-6 times a day. Illicit drug use:  none  Sun Exposure  : frequently  Exercise : Walks occasionally. Seat Belt Use  : almost always  Tobacco use   Never smoker. HIV risk factors: no    Medication History  Medications Reconciled     Past Surgical History   Mastectomy   bilateral  Spinal Decompression   lower back  Appendectomy    Cataract Surgery   bilateral  Breast Reconstruction   bilateral  Hysterectomy   non-cancerous: complete  Spinal Fusion   lower back  Spinal Surgery    Tonsillectomy    Tubal Ligation    LEFT TOTAL HIP ARTHROPLASTY (88124)  [11/16/2016]:    Diagnostic Studies History   MRI, Spine  Results: New MRI of the lumbar spine demonstrates the stable fusion. She does have a disc herniation at T12-L1. She also has some junctional stenosis at L1 and L2 which is moderate. Lumbar Spine X-ray  Results: . 2 views of her lumbar spine show a stable and healed L2-S1 posterior spinal fusion. There is significant degeneration of the L1 and L2 interbody space. Anterior osteophyte formation noted at that level as well. She does have a negative the amount of lumbar scoliosis. Lumbar Spine X Ray  Results: AP, lateral, flexion, and extension views of the lumbar spine reveal no evidence of fracture, lytic lesion, or instability. There is presence of a previous L2-S1 posterior spinal fusion with possible lucency around the right S1 screw.  There is moderate disc degeneration and spondylosis noted above her fusion at L1 to with moderate kyphosis throughout the disc space.  Pelvic X-ray  Results: AP of the pelvis performed today reveals bilateral end-stage degenerative joint disease in the bilateral hips. Shoulder X-ray   Right, Results: Severe degenerative changes of the right shoulder. MRI Spine Results:    Other Problems   Breast Cancer    Unspecified Diagnosis    Arthritis    Depression    Hypercholesterolemia    Unspecified Diagnosis    Unspecified Diagnosis      Physical Exam   Neurologic  Sensory  Light Touch - Intact - Globally. Overall Assessment of Muscle Strength and Tone reveals  Lower Extremities - Right Iliopsoas - 4/5. Left Iliopsoas - 4/5. Right Tibialis Anterior - 5/5. Left Tibialis Anterior - 5/5. Right Gastroc-Soleus - 5/5. Left Gastroc-Soleus - 5/5. Right EHL - 5/5. Left EHL - 5/5. General Assessment of Reflexes  Right Ankle - Clonus is not present. Left Ankle - Clonus is not present. Reflexes (Dermatomes)  2/2 Normal - Left Achilles (L5-S2), Left Knee (L2-4), Right Achilles (L5-S2) and Right Knee (L2-4). Musculoskeletal  Global Assessment  Examination of related systems reveals - well-developed, well-nourished, in no acute distress, alert and oriented x 3. Gait and Station - Note: The patient ambulates with a forward weight bearing line. Right Lower Extremity - normal strength and tone, normal range of motion without pain and no instability, subluxation or laxity. Left Lower Extremity - normal strength and tone, normal range of motion without pain and no instability, subluxation or laxity. Spine/Ribs/Pelvis  Cervical Spine - Examination of the cervical spine reveals - no tenderness to palpation, no pain, no swelling, edema or erythema, normal cervical spine movements and normal sensation. Thoracic (Dorsal) Spine - Examination of the thoracic spine reveals - no tenderness over thoracic vertebrae, no pain, normal sensation and normal thoracic spine movements.  Lumbosacral Spine - Examination of the lumbosacral spine reveals - no known fractures or deformities. Inspection and Palpation - Tenderness - moderate. Assessment of pain reveals the following findings - The pain is characterized as - moderate. Location - pain refers to lower back bilaterally. ROJM - Trunk Extension - 10 degrees. Lumbar Spine Flexion - . Lumbosacral Spine - Functional Testing - Babinski Test negative, Prone Knee Bending Test negative, Slump Test negative, Straight Leg Raising Test negative. Lower Extremity    Hip - Right Hip Physical Examination: - Note: There is significant pain with both internal and external rotation of the right hip. [place] is a positive logroll sign.  There is significant pain with deep flexion and internal and external rotation. Left Hip Physical Examination: - Note: There is significant pain with both internal and external rotation of the left hip. [place] is a positive logroll sign.  There is significant pain with deep flexion and internal and external rotation. Assessment & Plan   S/P lumbar fusion (V45.4  Z98.1)  Impression: She does have junctional stenosis now at L1 to at significant kyphosis. I think she is ultimately candidate for a revision laminectomy at L1 and L2. I would like to try a couple of epidurals at that level first. We'll fix her in the summer. The risks and benefits were discussed at length with the patient and the patient has elected to proceed. Indications for surgery include failed conservative treatment. Alternative treatments, risks and the perioperative course were discussed with the patient. All questions were answered. The risks and benefits of the procedure were explained. Benefits include definitive diagnosis, relief of pain, elimination of deformity and improved function. Risks of surgery including bleeding, infection, weakness, numbness, CSF leak, failure to improve symptoms, exacerbation of medical co-morbidities and even death were discussed with the patient. LOW BACK PAIN (724.2  M54.5)  Impression:  We are going to start with some conservative treatment. I will start some physical therapy medications. If she continues to have left-sided lower back symptoms I will consider a left SI joint injection. Lumbar stenosis with neurogenic claudication (724.03  M48.062)  Impression: The patient's radiologic findings have been reviewed with her in detail today. She is status post previous posterior spinal fusion from 2008, with evidence of lucency about the right S1 screw today. She has significant degeneration above her fusion at L1 to, and has some bilateral anterior thigh discomfort coupled with her recent change in bowel and bladder control. I have recommended that she obtain a MRI of the lumbar spine for further evaluation. Further, I think that she is also having significant symptoms from her end-stage degenerative joint disease in the bilateral hips. I have asked Dr. Luther Bowling to evaluate her today, and the patient is agreeable to this plan. We will see her back after completion of her MRI of the lumbar spine. Treatment options were discussed with the patient in full.(V65.49)  Current Plans  Presurgical planning was preformed with the patient today  Surgery to be scheduled  Addendum Note (Mateus Ching MD; 7/9/2018 7:49 AM)  The patient's films were reviewed. Paradise Healy has a prior fusion from L2-S1.  She has solid fusion there.  She has kyphosis and stenosis at L1-2.  Plan to do a laminectomy at L1-L2 with an Marcelino osteotomy.  She will need extension to the thoracolumbar junction to T10 for stabilization.     The risks and benefits were discussed at length with the patient and the patient has elected to proceed.  Indications for surgery include failed conservative treatment.  Alternative treatments, risks and the perioperative course were discussed with the patient.  All questions were answered.  The risks and benefits of the procedure were explained.  Benefits include definitive diagnosis, relief of pain, elimination of deformity and improved function.  Risks of surgery including bleeding, infection, weakness, numbness, CSF leak, failure to improve symptoms, exacerbation of medical co-morbidities and even death were discussed with the patient.           Signed by Anthony Trevino MD

## 2018-07-15 ENCOUNTER — ANESTHESIA EVENT (OUTPATIENT)
Dept: SURGERY | Age: 73
DRG: 457 | End: 2018-07-15
Payer: COMMERCIAL

## 2018-07-16 ENCOUNTER — ANESTHESIA (OUTPATIENT)
Dept: SURGERY | Age: 73
DRG: 457 | End: 2018-07-16
Payer: COMMERCIAL

## 2018-07-16 ENCOUNTER — HOSPITAL ENCOUNTER (INPATIENT)
Age: 73
LOS: 9 days | Discharge: NURSING, REHAB OR LONG-TERM CARE | DRG: 457 | End: 2018-07-25
Attending: ORTHOPAEDIC SURGERY | Admitting: ORTHOPAEDIC SURGERY
Payer: COMMERCIAL

## 2018-07-16 ENCOUNTER — APPOINTMENT (OUTPATIENT)
Dept: GENERAL RADIOLOGY | Age: 73
DRG: 457 | End: 2018-07-16
Attending: ORTHOPAEDIC SURGERY
Payer: COMMERCIAL

## 2018-07-16 DIAGNOSIS — M48.062 SPINAL STENOSIS OF LUMBAR REGION WITH NEUROGENIC CLAUDICATION: Primary | ICD-10-CM

## 2018-07-16 PROBLEM — M48.061 SPINAL STENOSIS, LUMBAR: Status: ACTIVE | Noted: 2018-07-16

## 2018-07-16 LAB
ABO + RH BLD: NORMAL
BLOOD GROUP ANTIBODIES SERPL: NORMAL
GLUCOSE BLD STRIP.AUTO-MCNC: 147 MG/DL (ref 65–100)
GLUCOSE BLD STRIP.AUTO-MCNC: 177 MG/DL (ref 65–100)
GLUCOSE BLD STRIP.AUTO-MCNC: 193 MG/DL (ref 65–100)
SERVICE CMNT-IMP: ABNORMAL
SPECIMEN EXP DATE BLD: NORMAL

## 2018-07-16 PROCEDURE — 77030037714 HC CLOSR DEV INCIS ZIP STRY -C: Performed by: ORTHOPAEDIC SURGERY

## 2018-07-16 PROCEDURE — 76210000017 HC OR PH I REC 1.5 TO 2 HR: Performed by: ORTHOPAEDIC SURGERY

## 2018-07-16 PROCEDURE — 74011000250 HC RX REV CODE- 250

## 2018-07-16 PROCEDURE — 0SG30K1 FUSION OF LUMBOSACRAL JOINT WITH NONAUTOLOGOUS TISSUE SUBSTITUTE, POSTERIOR APPROACH, POSTERIOR COLUMN, OPEN APPROACH: ICD-10-PCS | Performed by: ORTHOPAEDIC SURGERY

## 2018-07-16 PROCEDURE — 77030034475 HC MISC IMPL SPN: Performed by: ORTHOPAEDIC SURGERY

## 2018-07-16 PROCEDURE — 0QS004Z REPOSITION LUMBAR VERTEBRA WITH INTERNAL FIXATION DEVICE, OPEN APPROACH: ICD-10-PCS | Performed by: ORTHOPAEDIC SURGERY

## 2018-07-16 PROCEDURE — 0SG10K1 FUSION OF 2 OR MORE LUMBAR VERTEBRAL JOINTS WITH NONAUTOLOGOUS TISSUE SUBSTITUTE, POSTERIOR APPROACH, POSTERIOR COLUMN, OPEN APPROACH: ICD-10-PCS | Performed by: ORTHOPAEDIC SURGERY

## 2018-07-16 PROCEDURE — 74011250636 HC RX REV CODE- 250/636

## 2018-07-16 PROCEDURE — 74011250636 HC RX REV CODE- 250/636: Performed by: ORTHOPAEDIC SURGERY

## 2018-07-16 PROCEDURE — 0RG70K1 FUSION OF 2 TO 7 THORACIC VERTEBRAL JOINTS WITH NONAUTOLOGOUS TISSUE SUBSTITUTE, POSTERIOR APPROACH, POSTERIOR COLUMN, OPEN APPROACH: ICD-10-PCS | Performed by: ORTHOPAEDIC SURGERY

## 2018-07-16 PROCEDURE — 74011250636 HC RX REV CODE- 250/636: Performed by: ANESTHESIOLOGY

## 2018-07-16 PROCEDURE — 77030034169 HC GRFT BN BIOACTV INTRFC 1G BSTEB -F: Performed by: ORTHOPAEDIC SURGERY

## 2018-07-16 PROCEDURE — C1713 ANCHOR/SCREW BN/BN,TIS/BN: HCPCS | Performed by: ORTHOPAEDIC SURGERY

## 2018-07-16 PROCEDURE — 82962 GLUCOSE BLOOD TEST: CPT

## 2018-07-16 PROCEDURE — 74011250636 HC RX REV CODE- 250/636: Performed by: PHYSICIAN ASSISTANT

## 2018-07-16 PROCEDURE — 77030026438 HC STYL ET INTUB CARD -A: Performed by: ANESTHESIOLOGY

## 2018-07-16 PROCEDURE — 00QT0ZZ REPAIR SPINAL MENINGES, OPEN APPROACH: ICD-10-PCS | Performed by: ORTHOPAEDIC SURGERY

## 2018-07-16 PROCEDURE — 77030013079 HC BLNKT BAIR HGGR 3M -A: Performed by: ANESTHESIOLOGY

## 2018-07-16 PROCEDURE — 77030012893

## 2018-07-16 PROCEDURE — 76010000176 HC OR TIME 4.5 TO 5 HR INTENSV-TIER 1: Performed by: ORTHOPAEDIC SURGERY

## 2018-07-16 PROCEDURE — 4A11X4G MONITORING OF PERIPHERAL NERVOUS ELECTRICAL ACTIVITY, INTRAOPERATIVE, EXTERNAL APPROACH: ICD-10-PCS | Performed by: ORTHOPAEDIC SURGERY

## 2018-07-16 PROCEDURE — 77030038600 HC TU BPLR IRR DISP STRY -B: Performed by: ORTHOPAEDIC SURGERY

## 2018-07-16 PROCEDURE — 0PS404Z REPOSITION THORACIC VERTEBRA WITH INTERNAL FIXATION DEVICE, OPEN APPROACH: ICD-10-PCS | Performed by: ORTHOPAEDIC SURGERY

## 2018-07-16 PROCEDURE — 77030012406 HC DRN WND PENRS BARD -A: Performed by: ORTHOPAEDIC SURGERY

## 2018-07-16 PROCEDURE — 65270000032 HC RM SEMIPRIVATE

## 2018-07-16 PROCEDURE — 77030008684 HC TU ET CUF COVD -B: Performed by: ANESTHESIOLOGY

## 2018-07-16 PROCEDURE — 77030029099 HC BN WAX SSPC -A: Performed by: ORTHOPAEDIC SURGERY

## 2018-07-16 PROCEDURE — 77030034850: Performed by: ORTHOPAEDIC SURGERY

## 2018-07-16 PROCEDURE — 77030018836 HC SOL IRR NACL ICUM -A: Performed by: ORTHOPAEDIC SURGERY

## 2018-07-16 PROCEDURE — 0RGA0K1 FUSION OF THORACOLUMBAR VERTEBRAL JOINT WITH NONAUTOLOGOUS TISSUE SUBSTITUTE, POSTERIOR APPROACH, POSTERIOR COLUMN, OPEN APPROACH: ICD-10-PCS | Performed by: ORTHOPAEDIC SURGERY

## 2018-07-16 PROCEDURE — 77030013951 HC SEAL TISS ADH DURASL KT INLC -G1: Performed by: ORTHOPAEDIC SURGERY

## 2018-07-16 PROCEDURE — 0QP004Z REMOVAL OF INTERNAL FIXATION DEVICE FROM LUMBAR VERTEBRA, OPEN APPROACH: ICD-10-PCS | Performed by: ORTHOPAEDIC SURGERY

## 2018-07-16 PROCEDURE — 77030031139 HC SUT VCRL2 J&J -A: Performed by: ORTHOPAEDIC SURGERY

## 2018-07-16 PROCEDURE — 77030004391 HC BUR FLUT MEDT -C: Performed by: ORTHOPAEDIC SURGERY

## 2018-07-16 PROCEDURE — 76001 XR FLUOROSCOPY OVER 60 MINUTES: CPT

## 2018-07-16 PROCEDURE — 74011000258 HC RX REV CODE- 258

## 2018-07-16 PROCEDURE — 74011250637 HC RX REV CODE- 250/637: Performed by: PHYSICIAN ASSISTANT

## 2018-07-16 PROCEDURE — 77030020782 HC GWN BAIR PAWS FLX 3M -B

## 2018-07-16 PROCEDURE — 76060000040 HC ANESTHESIA 4.5 TO 5 HR: Performed by: ORTHOPAEDIC SURGERY

## 2018-07-16 PROCEDURE — 77030002924 HC SUT GORTX WLGO -B: Performed by: ORTHOPAEDIC SURGERY

## 2018-07-16 PROCEDURE — 77030038020 HC MANFLD NEPTUNE STRY -B: Performed by: ORTHOPAEDIC SURGERY

## 2018-07-16 PROCEDURE — 77030032490 HC SLV COMPR SCD KNE COVD -B: Performed by: ORTHOPAEDIC SURGERY

## 2018-07-16 PROCEDURE — 77030003153 HC GRFT COLGN DURAL INLC -E: Performed by: ORTHOPAEDIC SURGERY

## 2018-07-16 PROCEDURE — 74011000250 HC RX REV CODE- 250: Performed by: ORTHOPAEDIC SURGERY

## 2018-07-16 DEVICE — 450MM ROD, COCR
Type: IMPLANTABLE DEVICE | Site: SPINE LUMBAR | Status: FUNCTIONAL
Brand: FIREBIRD

## 2018-07-16 DEVICE — INTERFACE IS A SYNTHETIC BIOACTIVE BONE GRAFT FOR USE IN THE REPAIR OF OSSEOUS DEFECTS. IT IS SUPPLIED AS IRREGULAR SYNTHETIC GRANULES OF BIOACTIVE GLASS (45S5 BIOGLASS), SIZED FROM 200 MICRONS TO 420 MICRONS. WHEN IMPLANTED IN LIVING TISSUE, THE MATERIAL UNDERGOES A TIME DEPENDENT SURFACE MODIFICATION. THE SURFACE REACTION RESULTS IN THE FORMATION OF A CALCIUM PHOSPHATE LAYER, WHICH IS EQUIVALENT IN COMPOSITION AND STRUCTURE TO THE HYDROXYAPATITE FOUND IN BONE MINERAL. THE BIOLOGICAL APATITE LAYER OF THE GRANULES PROVIDES AN OSTEOCONDUCTIVE SCAFFOLD FOR THE GENERATION OF NEW OSSEOUS TISSUE. NEW BONE INFILTRATES AROUND THE GRANULES ALLOWING THE REPAIR OF THE DEFECT AS THE GRANULES ARE ABSORBED.
Type: IMPLANTABLE DEVICE | Site: SPINE LUMBAR | Status: FUNCTIONAL
Brand: INTERFACE

## 2018-07-16 DEVICE — DURAGEN® PLUS DURAL REGENERATION MATRIX, 2 IN X 2 IN (5 CM X 5 CM)
Type: IMPLANTABLE DEVICE | Site: SPINE LUMBAR | Status: FUNCTIONAL
Brand: DURAGEN® PLUS

## 2018-07-16 DEVICE — SET SCREW
Type: IMPLANTABLE DEVICE | Site: SPINE LUMBAR | Status: FUNCTIONAL
Brand: FIREBIRD NXG

## 2018-07-16 DEVICE — 5.5MM X 40MM BONE SCREW, SELF-TAPPING
Type: IMPLANTABLE DEVICE | Site: SPINE LUMBAR | Status: FUNCTIONAL
Brand: FIREBIRD

## 2018-07-16 DEVICE — 50MM MULTI-AXIAL CROSS-CONNECTOR
Type: IMPLANTABLE DEVICE | Site: SPINE LUMBAR | Status: FUNCTIONAL
Brand: FIREBIRD

## 2018-07-16 DEVICE — REDUCTION BODY
Type: IMPLANTABLE DEVICE | Site: SPINE LUMBAR | Status: FUNCTIONAL
Brand: FIREBIRD NXG

## 2018-07-16 DEVICE — 8.5MM X 45MM BONE SCREW, SELF-TAPPING
Type: IMPLANTABLE DEVICE | Site: SPINE LUMBAR | Status: FUNCTIONAL
Brand: FIREBIRD

## 2018-07-16 DEVICE — GRAFT BNE SUB L CANC FRZN MORSELIZED W/ VIABLE CELL TRINITY: Type: IMPLANTABLE DEVICE | Site: SPINE LUMBAR | Status: FUNCTIONAL

## 2018-07-16 RX ORDER — MORPHINE SULFATE 10 MG/ML
2 INJECTION, SOLUTION INTRAMUSCULAR; INTRAVENOUS
Status: DISCONTINUED | OUTPATIENT
Start: 2018-07-16 | End: 2018-07-16 | Stop reason: HOSPADM

## 2018-07-16 RX ORDER — DEXTROSE, SODIUM CHLORIDE, SODIUM LACTATE, POTASSIUM CHLORIDE, AND CALCIUM CHLORIDE 5; .6; .31; .03; .02 G/100ML; G/100ML; G/100ML; G/100ML; G/100ML
25 INJECTION, SOLUTION INTRAVENOUS CONTINUOUS
Status: DISCONTINUED | OUTPATIENT
Start: 2018-07-16 | End: 2018-07-16 | Stop reason: HOSPADM

## 2018-07-16 RX ORDER — MIDAZOLAM HYDROCHLORIDE 1 MG/ML
INJECTION, SOLUTION INTRAMUSCULAR; INTRAVENOUS AS NEEDED
Status: DISCONTINUED | OUTPATIENT
Start: 2018-07-16 | End: 2018-07-16 | Stop reason: HOSPADM

## 2018-07-16 RX ORDER — LIDOCAINE HYDROCHLORIDE 20 MG/ML
INJECTION, SOLUTION EPIDURAL; INFILTRATION; INTRACAUDAL; PERINEURAL AS NEEDED
Status: DISCONTINUED | OUTPATIENT
Start: 2018-07-16 | End: 2018-07-16 | Stop reason: HOSPADM

## 2018-07-16 RX ORDER — FENTANYL CITRATE 50 UG/ML
25 INJECTION, SOLUTION INTRAMUSCULAR; INTRAVENOUS
Status: COMPLETED | OUTPATIENT
Start: 2018-07-16 | End: 2018-07-16

## 2018-07-16 RX ORDER — AMOXICILLIN 250 MG
1 CAPSULE ORAL 2 TIMES DAILY
Status: DISCONTINUED | OUTPATIENT
Start: 2018-07-16 | End: 2018-07-25 | Stop reason: HOSPADM

## 2018-07-16 RX ORDER — DEXTROSE 50 % IN WATER (D50W) INTRAVENOUS SYRINGE
12.5-25 AS NEEDED
Status: DISCONTINUED | OUTPATIENT
Start: 2018-07-16 | End: 2018-07-25 | Stop reason: HOSPADM

## 2018-07-16 RX ORDER — BUPROPION HYDROCHLORIDE 150 MG/1
150 TABLET, EXTENDED RELEASE ORAL DAILY
Status: DISCONTINUED | OUTPATIENT
Start: 2018-07-17 | End: 2018-07-25 | Stop reason: HOSPADM

## 2018-07-16 RX ORDER — VANCOMYCIN 1.75 GRAM/500 ML IN 0.9 % SODIUM CHLORIDE INTRAVENOUS
1750 EVERY 12 HOURS
Status: COMPLETED | OUTPATIENT
Start: 2018-07-17 | End: 2018-07-17

## 2018-07-16 RX ORDER — OXYCODONE HYDROCHLORIDE 5 MG/1
10 TABLET ORAL
Status: DISCONTINUED | OUTPATIENT
Start: 2018-07-16 | End: 2018-07-25 | Stop reason: HOSPADM

## 2018-07-16 RX ORDER — SODIUM CHLORIDE, SODIUM LACTATE, POTASSIUM CHLORIDE, CALCIUM CHLORIDE 600; 310; 30; 20 MG/100ML; MG/100ML; MG/100ML; MG/100ML
25 INJECTION, SOLUTION INTRAVENOUS CONTINUOUS
Status: DISCONTINUED | OUTPATIENT
Start: 2018-07-16 | End: 2018-07-16 | Stop reason: HOSPADM

## 2018-07-16 RX ORDER — FACIAL-BODY WIPES
10 EACH TOPICAL DAILY PRN
Status: DISCONTINUED | OUTPATIENT
Start: 2018-07-18 | End: 2018-07-25 | Stop reason: HOSPADM

## 2018-07-16 RX ORDER — MORPHINE SULFATE 1 MG/ML
INJECTION, SOLUTION INTRAVENOUS
Status: DISCONTINUED | OUTPATIENT
Start: 2018-07-16 | End: 2018-07-16 | Stop reason: SDUPTHER

## 2018-07-16 RX ORDER — HYDROMORPHONE HYDROCHLORIDE 1 MG/ML
INJECTION, SOLUTION INTRAMUSCULAR; INTRAVENOUS; SUBCUTANEOUS
Status: DISPENSED
Start: 2018-07-16 | End: 2018-07-17

## 2018-07-16 RX ORDER — PROPOFOL 10 MG/ML
INJECTION, EMULSION INTRAVENOUS
Status: DISCONTINUED | OUTPATIENT
Start: 2018-07-16 | End: 2018-07-16 | Stop reason: HOSPADM

## 2018-07-16 RX ORDER — KETAMINE HYDROCHLORIDE 10 MG/ML
INJECTION, SOLUTION INTRAMUSCULAR; INTRAVENOUS AS NEEDED
Status: DISCONTINUED | OUTPATIENT
Start: 2018-07-16 | End: 2018-07-16 | Stop reason: HOSPADM

## 2018-07-16 RX ORDER — CLINDAMYCIN PHOSPHATE 10 MG/G
GEL TOPICAL 2 TIMES DAILY
Status: DISCONTINUED | OUTPATIENT
Start: 2018-07-16 | End: 2018-07-25 | Stop reason: HOSPADM

## 2018-07-16 RX ORDER — EPHEDRINE SULFATE 50 MG/ML
INJECTION, SOLUTION INTRAVENOUS AS NEEDED
Status: DISCONTINUED | OUTPATIENT
Start: 2018-07-16 | End: 2018-07-16 | Stop reason: HOSPADM

## 2018-07-16 RX ORDER — CYCLOBENZAPRINE HCL 10 MG
10 TABLET ORAL
Status: DISCONTINUED | OUTPATIENT
Start: 2018-07-16 | End: 2018-07-25 | Stop reason: HOSPADM

## 2018-07-16 RX ORDER — MIDAZOLAM HYDROCHLORIDE 1 MG/ML
1 INJECTION, SOLUTION INTRAMUSCULAR; INTRAVENOUS AS NEEDED
Status: DISCONTINUED | OUTPATIENT
Start: 2018-07-16 | End: 2018-07-16 | Stop reason: HOSPADM

## 2018-07-16 RX ORDER — CARVEDILOL 3.12 MG/1
3.12 TABLET ORAL DAILY
Status: DISCONTINUED | OUTPATIENT
Start: 2018-07-17 | End: 2018-07-25 | Stop reason: HOSPADM

## 2018-07-16 RX ORDER — DIPHENHYDRAMINE HYDROCHLORIDE 50 MG/ML
12.5 INJECTION, SOLUTION INTRAMUSCULAR; INTRAVENOUS
Status: ACTIVE | OUTPATIENT
Start: 2018-07-16 | End: 2018-07-17

## 2018-07-16 RX ORDER — NALOXONE HYDROCHLORIDE 0.4 MG/ML
0.4 INJECTION, SOLUTION INTRAMUSCULAR; INTRAVENOUS; SUBCUTANEOUS AS NEEDED
Status: DISCONTINUED | OUTPATIENT
Start: 2018-07-16 | End: 2018-07-25 | Stop reason: HOSPADM

## 2018-07-16 RX ORDER — INSULIN LISPRO 100 [IU]/ML
INJECTION, SOLUTION INTRAVENOUS; SUBCUTANEOUS
Status: DISCONTINUED | OUTPATIENT
Start: 2018-07-16 | End: 2018-07-25 | Stop reason: HOSPADM

## 2018-07-16 RX ORDER — PRAVASTATIN SODIUM 40 MG/1
80 TABLET ORAL
Status: DISCONTINUED | OUTPATIENT
Start: 2018-07-16 | End: 2018-07-25 | Stop reason: HOSPADM

## 2018-07-16 RX ORDER — SODIUM CHLORIDE 0.9 % (FLUSH) 0.9 %
5-10 SYRINGE (ML) INJECTION EVERY 8 HOURS
Status: DISCONTINUED | OUTPATIENT
Start: 2018-07-17 | End: 2018-07-25 | Stop reason: HOSPADM

## 2018-07-16 RX ORDER — MORPHINE SULFATE IN 0.9 % NACL 150MG/30ML
PATIENT CONTROLLED ANALGESIA SYRINGE INTRAVENOUS
Status: DISCONTINUED | OUTPATIENT
Start: 2018-07-16 | End: 2018-07-17

## 2018-07-16 RX ORDER — OXAZEPAM 15 MG/1
15 CAPSULE ORAL
Status: DISCONTINUED | OUTPATIENT
Start: 2018-07-16 | End: 2018-07-18

## 2018-07-16 RX ORDER — ACETAMINOPHEN 325 MG/1
650 TABLET ORAL EVERY 6 HOURS
Status: DISCONTINUED | OUTPATIENT
Start: 2018-07-16 | End: 2018-07-25 | Stop reason: HOSPADM

## 2018-07-16 RX ORDER — KETOROLAC TROMETHAMINE 30 MG/ML
15 INJECTION, SOLUTION INTRAMUSCULAR; INTRAVENOUS EVERY 6 HOURS
Status: COMPLETED | OUTPATIENT
Start: 2018-07-16 | End: 2018-07-17

## 2018-07-16 RX ORDER — SERTRALINE HYDROCHLORIDE 50 MG/1
100 TABLET, FILM COATED ORAL DAILY
Status: DISCONTINUED | OUTPATIENT
Start: 2018-07-17 | End: 2018-07-25 | Stop reason: HOSPADM

## 2018-07-16 RX ORDER — SODIUM CHLORIDE 0.9 % (FLUSH) 0.9 %
5-10 SYRINGE (ML) INJECTION AS NEEDED
Status: DISCONTINUED | OUTPATIENT
Start: 2018-07-16 | End: 2018-07-16 | Stop reason: HOSPADM

## 2018-07-16 RX ORDER — TRIAMCINOLONE ACETONIDE 1 MG/G
CREAM TOPICAL
Status: DISCONTINUED | OUTPATIENT
Start: 2018-07-16 | End: 2018-07-25 | Stop reason: HOSPADM

## 2018-07-16 RX ORDER — FENTANYL CITRATE 50 UG/ML
50 INJECTION, SOLUTION INTRAMUSCULAR; INTRAVENOUS AS NEEDED
Status: DISCONTINUED | OUTPATIENT
Start: 2018-07-16 | End: 2018-07-16 | Stop reason: HOSPADM

## 2018-07-16 RX ORDER — POLYETHYLENE GLYCOL 3350 17 G/17G
17 POWDER, FOR SOLUTION ORAL DAILY
Status: DISCONTINUED | OUTPATIENT
Start: 2018-07-17 | End: 2018-07-25 | Stop reason: HOSPADM

## 2018-07-16 RX ORDER — METFORMIN HYDROCHLORIDE 500 MG/1
500 TABLET, EXTENDED RELEASE ORAL
Status: DISCONTINUED | OUTPATIENT
Start: 2018-07-17 | End: 2018-07-25 | Stop reason: HOSPADM

## 2018-07-16 RX ORDER — SODIUM CHLORIDE 0.9 % (FLUSH) 0.9 %
5-10 SYRINGE (ML) INJECTION EVERY 8 HOURS
Status: DISCONTINUED | OUTPATIENT
Start: 2018-07-16 | End: 2018-07-25 | Stop reason: HOSPADM

## 2018-07-16 RX ORDER — CEFAZOLIN SODIUM/WATER 2 G/20 ML
2 SYRINGE (ML) INTRAVENOUS ONCE
Status: COMPLETED | OUTPATIENT
Start: 2018-07-16 | End: 2018-07-16

## 2018-07-16 RX ORDER — SODIUM CHLORIDE 9 MG/ML
1000 INJECTION, SOLUTION INTRAVENOUS CONTINUOUS
Status: DISCONTINUED | OUTPATIENT
Start: 2018-07-16 | End: 2018-07-16 | Stop reason: HOSPADM

## 2018-07-16 RX ORDER — HYDROCHLOROTHIAZIDE 25 MG/1
25 TABLET ORAL DAILY
Status: DISCONTINUED | OUTPATIENT
Start: 2018-07-17 | End: 2018-07-17

## 2018-07-16 RX ORDER — SODIUM CHLORIDE 0.9 % (FLUSH) 0.9 %
5-10 SYRINGE (ML) INJECTION AS NEEDED
Status: DISCONTINUED | OUTPATIENT
Start: 2018-07-16 | End: 2018-07-25 | Stop reason: HOSPADM

## 2018-07-16 RX ORDER — MAGNESIUM SULFATE 100 %
4 CRYSTALS MISCELLANEOUS AS NEEDED
Status: DISCONTINUED | OUTPATIENT
Start: 2018-07-16 | End: 2018-07-25 | Stop reason: HOSPADM

## 2018-07-16 RX ORDER — LIDOCAINE HYDROCHLORIDE 10 MG/ML
0.1 INJECTION, SOLUTION EPIDURAL; INFILTRATION; INTRACAUDAL; PERINEURAL AS NEEDED
Status: DISCONTINUED | OUTPATIENT
Start: 2018-07-16 | End: 2018-07-16 | Stop reason: HOSPADM

## 2018-07-16 RX ORDER — VANCOMYCIN HYDROCHLORIDE 1 G/20ML
1 INJECTION, POWDER, LYOPHILIZED, FOR SOLUTION INTRAVENOUS ONCE
Status: COMPLETED | OUTPATIENT
Start: 2018-07-16 | End: 2018-07-16

## 2018-07-16 RX ORDER — OXYCODONE HYDROCHLORIDE 5 MG/1
5 TABLET ORAL
Status: DISCONTINUED | OUTPATIENT
Start: 2018-07-16 | End: 2018-07-25 | Stop reason: HOSPADM

## 2018-07-16 RX ORDER — MIDAZOLAM HYDROCHLORIDE 1 MG/ML
0.5 INJECTION, SOLUTION INTRAMUSCULAR; INTRAVENOUS
Status: DISCONTINUED | OUTPATIENT
Start: 2018-07-16 | End: 2018-07-16 | Stop reason: HOSPADM

## 2018-07-16 RX ORDER — SODIUM CHLORIDE 0.9 % (FLUSH) 0.9 %
5-10 SYRINGE (ML) INJECTION EVERY 8 HOURS
Status: DISCONTINUED | OUTPATIENT
Start: 2018-07-16 | End: 2018-07-16 | Stop reason: HOSPADM

## 2018-07-16 RX ORDER — PHENYLEPHRINE HCL IN 0.9% NACL 0.4MG/10ML
SYRINGE (ML) INTRAVENOUS AS NEEDED
Status: DISCONTINUED | OUTPATIENT
Start: 2018-07-16 | End: 2018-07-16

## 2018-07-16 RX ORDER — MORPHINE SULFATE 1 MG/ML
2 INJECTION, SOLUTION EPIDURAL; INTRATHECAL; INTRAVENOUS ONCE
Status: ACTIVE | OUTPATIENT
Start: 2018-07-16 | End: 2018-07-17

## 2018-07-16 RX ORDER — ONDANSETRON 2 MG/ML
INJECTION INTRAMUSCULAR; INTRAVENOUS AS NEEDED
Status: DISCONTINUED | OUTPATIENT
Start: 2018-07-16 | End: 2018-07-16 | Stop reason: HOSPADM

## 2018-07-16 RX ORDER — ACETAMINOPHEN 10 MG/ML
INJECTION, SOLUTION INTRAVENOUS AS NEEDED
Status: DISCONTINUED | OUTPATIENT
Start: 2018-07-16 | End: 2018-07-16 | Stop reason: HOSPADM

## 2018-07-16 RX ORDER — SUCCINYLCHOLINE CHLORIDE 20 MG/ML
INJECTION INTRAMUSCULAR; INTRAVENOUS AS NEEDED
Status: DISCONTINUED | OUTPATIENT
Start: 2018-07-16 | End: 2018-07-16 | Stop reason: HOSPADM

## 2018-07-16 RX ORDER — PROPOFOL 10 MG/ML
INJECTION, EMULSION INTRAVENOUS AS NEEDED
Status: DISCONTINUED | OUTPATIENT
Start: 2018-07-16 | End: 2018-07-16 | Stop reason: HOSPADM

## 2018-07-16 RX ORDER — ALBUTEROL SULFATE 0.83 MG/ML
2.5 SOLUTION RESPIRATORY (INHALATION)
Status: DISCONTINUED | OUTPATIENT
Start: 2018-07-16 | End: 2018-07-21

## 2018-07-16 RX ORDER — ONDANSETRON 2 MG/ML
4 INJECTION INTRAMUSCULAR; INTRAVENOUS AS NEEDED
Status: DISCONTINUED | OUTPATIENT
Start: 2018-07-16 | End: 2018-07-16 | Stop reason: HOSPADM

## 2018-07-16 RX ORDER — DIPHENHYDRAMINE HYDROCHLORIDE 50 MG/ML
12.5 INJECTION, SOLUTION INTRAMUSCULAR; INTRAVENOUS AS NEEDED
Status: DISCONTINUED | OUTPATIENT
Start: 2018-07-16 | End: 2018-07-16 | Stop reason: HOSPADM

## 2018-07-16 RX ORDER — SODIUM CHLORIDE 9 MG/ML
25 INJECTION, SOLUTION INTRAVENOUS CONTINUOUS
Status: DISCONTINUED | OUTPATIENT
Start: 2018-07-16 | End: 2018-07-16 | Stop reason: HOSPADM

## 2018-07-16 RX ORDER — ONDANSETRON 2 MG/ML
4 INJECTION INTRAMUSCULAR; INTRAVENOUS
Status: ACTIVE | OUTPATIENT
Start: 2018-07-16 | End: 2018-07-17

## 2018-07-16 RX ORDER — SODIUM CHLORIDE 9 MG/ML
125 INJECTION, SOLUTION INTRAVENOUS CONTINUOUS
Status: DISPENSED | OUTPATIENT
Start: 2018-07-16 | End: 2018-07-17

## 2018-07-16 RX ORDER — KETAMINE HYDROCHLORIDE 10 MG/ML
INJECTION, SOLUTION INTRAMUSCULAR; INTRAVENOUS
Status: COMPLETED
Start: 2018-07-16 | End: 2018-07-16

## 2018-07-16 RX ORDER — HYDROMORPHONE HYDROCHLORIDE 2 MG/ML
INJECTION, SOLUTION INTRAMUSCULAR; INTRAVENOUS; SUBCUTANEOUS AS NEEDED
Status: DISCONTINUED | OUTPATIENT
Start: 2018-07-16 | End: 2018-07-16 | Stop reason: HOSPADM

## 2018-07-16 RX ADMIN — VANCOMYCIN HYDROCHLORIDE 1 G: 1 INJECTION, POWDER, LYOPHILIZED, FOR SOLUTION INTRAVENOUS at 16:40

## 2018-07-16 RX ADMIN — OXAZEPAM 15 MG: 15 CAPSULE, GELATIN COATED ORAL at 21:06

## 2018-07-16 RX ADMIN — Medication: at 18:15

## 2018-07-16 RX ADMIN — FENTANYL CITRATE 25 MCG: 50 INJECTION, SOLUTION INTRAMUSCULAR; INTRAVENOUS at 17:46

## 2018-07-16 RX ADMIN — PROPOFOL: 10 INJECTION, EMULSION INTRAVENOUS at 14:10

## 2018-07-16 RX ADMIN — ACETAMINOPHEN 1000 MG: 10 INJECTION, SOLUTION INTRAVENOUS at 13:24

## 2018-07-16 RX ADMIN — FENTANYL CITRATE 25 MCG: 50 INJECTION, SOLUTION INTRAMUSCULAR; INTRAVENOUS at 18:32

## 2018-07-16 RX ADMIN — EPHEDRINE SULFATE 10 MG: 50 INJECTION, SOLUTION INTRAVENOUS at 13:33

## 2018-07-16 RX ADMIN — SUCCINYLCHOLINE CHLORIDE 160 MG: 20 INJECTION INTRAMUSCULAR; INTRAVENOUS at 12:46

## 2018-07-16 RX ADMIN — SODIUM CHLORIDE 125 ML/HR: 900 INJECTION, SOLUTION INTRAVENOUS at 18:17

## 2018-07-16 RX ADMIN — PROPOFOL 150 MG: 10 INJECTION, EMULSION INTRAVENOUS at 12:55

## 2018-07-16 RX ADMIN — MIDAZOLAM HYDROCHLORIDE 2 MG: 1 INJECTION, SOLUTION INTRAMUSCULAR; INTRAVENOUS at 12:41

## 2018-07-16 RX ADMIN — ONDANSETRON 4 MG: 2 INJECTION INTRAMUSCULAR; INTRAVENOUS at 13:33

## 2018-07-16 RX ADMIN — PROPOFOL 100 MG: 10 INJECTION, EMULSION INTRAVENOUS at 13:01

## 2018-07-16 RX ADMIN — HYDROMORPHONE HYDROCHLORIDE 1 MG: 2 INJECTION, SOLUTION INTRAMUSCULAR; INTRAVENOUS; SUBCUTANEOUS at 13:23

## 2018-07-16 RX ADMIN — FENTANYL CITRATE 25 MCG: 50 INJECTION, SOLUTION INTRAMUSCULAR; INTRAVENOUS at 18:37

## 2018-07-16 RX ADMIN — KETOROLAC TROMETHAMINE 15 MG: 30 INJECTION, SOLUTION INTRAMUSCULAR; INTRAVENOUS at 19:10

## 2018-07-16 RX ADMIN — Medication 2 G: at 13:10

## 2018-07-16 RX ADMIN — SODIUM CHLORIDE, SODIUM LACTATE, POTASSIUM CHLORIDE, AND CALCIUM CHLORIDE: 600; 310; 30; 20 INJECTION, SOLUTION INTRAVENOUS at 14:47

## 2018-07-16 RX ADMIN — KETAMINE HYDROCHLORIDE 10 MG: 10 INJECTION, SOLUTION INTRAMUSCULAR; INTRAVENOUS at 13:22

## 2018-07-16 RX ADMIN — EPHEDRINE SULFATE 5 MG: 50 INJECTION, SOLUTION INTRAVENOUS at 14:55

## 2018-07-16 RX ADMIN — ACETAMINOPHEN 650 MG: 325 TABLET, FILM COATED ORAL at 21:06

## 2018-07-16 RX ADMIN — PROPOFOL 100 MCG/KG/MIN: 10 INJECTION, EMULSION INTRAVENOUS at 13:08

## 2018-07-16 RX ADMIN — LIDOCAINE HYDROCHLORIDE 100 MG: 20 INJECTION, SOLUTION EPIDURAL; INFILTRATION; INTRACAUDAL; PERINEURAL at 12:46

## 2018-07-16 RX ADMIN — SODIUM CHLORIDE, SODIUM LACTATE, POTASSIUM CHLORIDE, AND CALCIUM CHLORIDE 25 ML/HR: 600; 310; 30; 20 INJECTION, SOLUTION INTRAVENOUS at 12:08

## 2018-07-16 RX ADMIN — FENTANYL CITRATE 25 MCG: 50 INJECTION, SOLUTION INTRAMUSCULAR; INTRAVENOUS at 18:25

## 2018-07-16 RX ADMIN — PROPOFOL 50 MG: 10 INJECTION, EMULSION INTRAVENOUS at 12:52

## 2018-07-16 RX ADMIN — KETAMINE HYDROCHLORIDE 20 MG: 10 INJECTION, SOLUTION INTRAMUSCULAR; INTRAVENOUS at 13:13

## 2018-07-16 RX ADMIN — SENNOSIDES AND DOCUSATE SODIUM 1 TABLET: 8.6; 5 TABLET ORAL at 21:06

## 2018-07-16 NOTE — IP AVS SNAPSHOT
[address] [facility] [address] [place] [address] 
[phone] Patient: Sree Plummer MRN: [id number] ZDT:[id number] About your hospitalization You were admitted on:  July 16, 2018 You last received care in the:  [address] [place] [address] You were discharged on:  July 25, 2018 Why you were hospitalized Your primary diagnosis was:  Not on File Your diagnoses also included:  Spinal Stenosis, Lumbar Follow-up Information Follow up With Details Comments Contact Info Myrtha Bence, MD  please follow-up with your PCP regarding your elevated cholesterol and ldl [place] [facility] 
[phone] [address] 
[phone] Discharge Orders None A check kaylie indicates which time of day the medication should be taken. My Medications START taking these medications Instructions Each Dose to Equal  
 Morning Noon Evening Bedtime  
 gabapentin 300 mg capsule Commonly known as:  NEURONTIN Your last dose was: Your next dose is: Take 1 Cap by mouth three (3) times daily. 300 mg  
    
   
   
   
  
 oxyCODONE IR 5 mg immediate release tablet Commonly known as:  [place]ridge Ditto Your last dose was: Your next dose is: Take 1-2 Tabs by mouth every four (4) hours as needed. Max Daily Amount: 60 mg.  
 5-10 mg CONTINUE taking these medications Instructions Each Dose to Equal  
 Morning Noon Evening Bedtime ACETYL-L-CARNITINE Your last dose was: Your next dose is: Take 500 mg by mouth daily. 500 mg ALLEGRA-D 24 HOUR 180-240 mg per tablet Generic drug:  fexofenadine-pseudoephedrine Your last dose was: Your next dose is: Take 1 Tab by mouth daily as needed. 1 Tab buPROPion  mg tablet Commonly known as:  Bo Zarate Your last dose was: Your next dose is: TAKE 1 TABLET BY MOUTH EVERY DAY  
     
   
   
   
  
 CLARITIN-D 12 HOUR 5-120 mg per tablet Generic drug:  loratadine-pseudoephedrine Your last dose was: Your next dose is: Take 1 Tab by mouth two (2) times a day. 1 Tab  
    
   
   
   
  
 clindamycin 1 % topical gel Commonly known as:  CLINDAGEL Your last dose was: Your next dose is:    
   
   
 Apply  to affected area two (2) times a day. use thin film on affected area COREG 6.25 mg tablet Generic drug:  carvedilol Your last dose was: Your next dose is: Take 3.125 mg by mouth daily. 3.125 mg  
    
   
   
   
  
 ELOCON 0.1 % topical cream  
Generic drug:  mometasone Your last dose was: Your next dose is:    
   
   
 Apply  to affected area daily as needed. FINACEA 15 % topical gel Generic drug:  azelaic acid Your last dose was: Your next dose is:    
   
   
 Apply  to affected area two (2) times a day. hydroCHLOROthiazide 25 mg tablet Commonly known as:  HYDRODIURIL Your last dose was: Your next dose is: TAKE 1 TABLET BY MOUTH DAILY  
     
   
   
   
  
 levalbuterol 1.25 mg/0.5 mL Nebu Commonly known as:  [place] Your last dose was: Your next dose is: 1.25 mg by Nebulization route as needed. 1.25 mg  
    
   
   
   
  
 metFORMIN  mg tablet Commonly known as:  GLUCOPHAGE XR Your last dose was: Your next dose is: Take one tablet in the morning with breakfast and one tablet in the evening with dinner. multivitamin tablet Commonly known as:  ONE A DAY Your last dose was: Your next dose is: Take 1 Tab by mouth daily. 1 Tab  
    
   
   
   
  
 oxazepam 15 mg capsule Commonly known as:  SERAX Your last dose was: Your next dose is: TAKE 2 CAPSULES AT BEDTIME  
     
   
   
   
  
 pravastatin 80 mg tablet Commonly known as:  PRAVACHOL Your last dose was: Your next dose is: Take 1 Tab by mouth nightly. 80 mg  
    
   
   
   
  
 sertraline 100 mg tablet Commonly known as:  ZOLOFT Your last dose was: Your next dose is: TAKE 1 TABLET BY MOUTH DAILY VENTOLIN HFA 90 mcg/actuation inhaler Generic drug:  albuterol Your last dose was: Your next dose is: Take  by inhalation every four (4) hours as needed. STOP taking these medications   
 diclofenac EC 75 mg EC tablet Commonly known as:  VOLTAREN Where to Get Your Medications Information on where to get these meds will be given to you by the nurse or doctor. ! Ask your nurse or doctor about these medications  
  gabapentin 300 mg capsule  
 oxyCODONE IR 5 mg immediate release tablet Opioid Education Prescription Opioids: What You Need to Know: 
 
 
Procedure: Procedure(s): 
L1-2 LAMINECTOMY WITH RODNEY OSTEOTOMY WITH EXTENSION OF FUSION TO T10 (OXYGEN), DURA REPAIR  PCP: Ascencion Barajas MD 
 
Follow up appointments 
-follow up with Dr. Dr. Lexx Escalante in 2 weeks.   Call [phone] to make an appointment as soon as you get home from the hospital. 
 
[address]: ____________________   phone: _______________________ The agency will contact you to arrange dates/times for visits. Please call them if you do not hear from them within 24 hours after you are discharged Physical therapy 3 times a week for 3 weeks Nursing-initial assessment and as needed When to call your Orthopaedic Surgeon: 
-Signs of infection-if your incision is red; continues to have drainage; drainage has a foul odor or if you have a persistent fever over 101 degrees for 24 hours 
-Nausea or vomiting, severe headache 
-Loss of bowel or bladder function, inability to urinate 
-Changes in sensation in your arms or legs (numbness, tingling, loss of color) -Increased weakness-greater than before your surgery 
-Severe pain or pain not relieved by medications 
-Signs of a blood clot in your leg-calf pain, tenderness, redness, swelling of lower leg When to call your Primary Care Physician: 
-Concerns about medical conditions such as diabetes, high blood pressure, asthma, congestive heart failure 
-Call if blood sugars are elevated, persistent headache or dizziness, coughing or congestion, constipation or diarrhea, burning with urination, abnormal heart rate When to call 911 and go to the nearest emergency room: 
-Acute onset of chest pain, shortness of breath, difficulty breathing Activity 
-You are going home a well person, be as active as possible. Your only exercise should be walking. Start with short frequent walks and increase your walking distance each day. 
-Limit the amount of time you sit to 20-30 minute intervals. Sitting for prolonged periods of time will be uncomfortable for you following surgery. 
-Do NOT lift anything over 5 pounds 
-Do NOT do any straining, twisting or bending 
-When you are in bed, you may lay on your back or on either side. Do NOT lie on your stomach Brace -If you have a back brace, you should wear your brace at all times when you are out of bed. Do not wear the brace while in bed or showering. 
-Remember to always wear a cotton t-shirt underneath your brace. 
-Do not bend or twist when your brace is off Diet 
-Resume usual diet; drink plenty of fluids; eat foods high in fiber 
-It is important to have regular bowel movements. Pain medications may cause constipation. You may want to take a stool softener (such as Senokot-S or Colace) to prevent constipation. 
 -If constipation occurs, take a laxative (such as Dulcolax tablets, Milk of Magnesia, or a suppository). Laxatives should only be used if the above preventable measures have failed and you still have not had a bowel movement after three days Driving 
-You may not drive or return to work until instructed by your physician. However, you may ride in the car for short periods of time. Incision Care Your incision has been closed with absorbable sutures and the Zipline skin closure system. This will assist with healing. The [place] is to remain on your incision for 2 weeks. A dry dressing (ABD and tape) will be placed over it and should be changed daily, for at least the first several days after your surgery. If you have no incisional drainage, you may leave the incision open to air if you wish, still leaving the Zipline in place. Please make sure to wash your hands prior to touching your dressing. You may take brief showers but do not run the water directly onto the wound. After your shower, blot your incision dry with a soft towel and replace the dry dressing. Do not allow the tape to come in contact with the Zipline. Do not rub or apply any lotions or ointments to your incision site. Do not soak or scrub your wound. The Zipline dressing will be removed during your two week follow-up appointment.  If you experience drainage leaking from underneath the [facility] or if it peels off before 2 weeks, please contact your orthopedic surgeons office. Showering 
-You may shower in approximately 4 days after your surgery.   
-Leave the dressing on during your shower. Do NOT allow the water to run directly onto your dressing. Once you get out of the shower, gently pat the dressing dry. -Reminder- your brace can be removed while showering. Remember to not bend or twist while your brace is off.   
-Do not take a tub bath. Preventing blood clots 
-You have been given T.E.D. stockings to wear. Continue to wear these for 7 days after your discharge. Put them on in the morning and take them off at night.   
-They are used to increase your circulation and prevent blood clots from forming in your legs 
-T. E.D. stockings can be machine washed, temperature not to exceed 160° F (71°C) and machine dried for 15 to 20 minutes, temperature not to exceed 250° F (121°C). Pain management 
-Take pain medication as prescribed; decrease the amount you use as your pain lessens 
-DO not wait until you are in extreme pain to take your medication. 
-Avoid alcoholic beverages while taking pain medication Pain Medication Safety DO: 
-Read the Medication Guide  
-Take your medicine exactly as prescribed  
-Store your medicine away from children and in a safe place  
-Flush unused medicine down the toilet  
-Call your healthcare provider for medical advice about side effects. You may report side effects to FDA at 7-693-FDA-6323.  
-Please be aware that many medications contain Tylenol. We do not want you to over medicate so please read the information below as a guide. Do not take more than 4 Grams of Tylenol in a 24 hour period.   (There are 1000 milligrams in one Gram) Percocet contains 325 mg of Tylenol per tablet (do not take more than 12 tablets in 24 hours) Lortab contains 500 mg of Tylenol per tablet (do not take more than 8 tablets in 24 hours) Norco contains 325 mg of Tylenol per tablet (do not take more than 12 tablets in 24 hours). DO NOT: 
-Do not give your medicine to others  
-Do not take medicine unless it was prescribed for you  
-Do not stop taking your medicine without talking to your healthcare provider  
-Do not break, chew, crush, dissolve, or inject your medicine. If you cannot swallow your medicine whole, talk to your healthcare provider. 
-Do not drink alcohol while taking this medicine 
-Do not take anti-inflammatory medications or aspirin unless instructed by your     physician. Introducing [facility] HE[facility]! [facility] introduces zEconomy patient portal. Now you can access parts of your medical record, email your doctor's office, and request medication refills online. 1. In your internet browser, go to [email or web address] 2. Click on the First Time User? Click Here link in the Sign In box. You will see the New Member Sign Up page. 3. Enter your zEconomy Access Code exactly as it appears below. You will not need to use this code after youve completed the sign-up process. If you do not sign up before the expiration date, you must request a new code. · zEconomy Access Code: [email or web address] Expires: 10/7/2018 12:04 PM 
 
4. Enter the last four digits of your Social Security Number (xxxx) and Date of Birth (mm/dd/yyyy) as indicated and click Submit. You will be taken to the next sign-up page. 5. Create a Appticlest ID. This will be your zEconomy login ID and cannot be changed, so think of one that is secure and easy to remember. 6. Create a Appticlest password. You can change your password at any time. 7. Enter your Password Reset Question and Answer.  This can be used at a later time if you forget your password. 8. Enter your e-mail address. You will receive e-mail notification when new information is available in [address] 9. Click Sign Up. You can now view and download portions of your medical record. 10. Click the Download Summary menu link to download a portable copy of your medical information. If you have questions, please visit the Frequently Asked Questions section of the NitroSellt website. Remember, [email or web address] is NOT to be used for urgent needs. For medical emergencies, dial 911. Now available from your iPhone and Android! Introducing Woody Tracy As a [facility] patient, I wanted to make you aware of our electronic visit tool called Woody Tracy. Theravanceer 24/7 allows you to connect within minutes with a medical provider 24 hours a day, seven days a week via a mobile device or tablet or logging into a secure website from your computer. You can access Woody Tracy from anywhere in the [place]. A virtual visit might be right for you when you have a simple condition and feel like you just dont want to get out of bed, or cant get away from work for an appointment, when your regular [facility] provider is not available (evenings, weekends or holidays), or when youre out of town and need minor care. Electronic visits cost only $49 and if the FayLiquid Spinser 24/7 provider determines a prescription is needed to treat your condition, one can be electronically transmitted to a nearby pharmacy*. Please take a moment to enroll today if you have not already done so. The enrollment process is free and takes just a few minutes. To enroll, please download the [facility]/JCD aminta to your tablet or phone, or visit [email or web address] to enroll on your computer.    
And, as an [address] [place] [address] patient with a [facility] account, the results of your visits will be scanned into your electronic medical record and your primary care provider will be able to view the scanned results. We urge you to continue to see your regular [facility] provider for your ongoing medical care. And while your primary care provider may not be the one available when you seek a ExTractApps virtual visit, the peace of mind you get from getting a real diagnosis real time can be priceless. For more information on ExTractApps, view our Frequently Asked Questions (FAQs) at [email or web address]. Sincerely, 
 
Jamshid Vinson MD 
Chief Medical Officer [address] *:  certain medications cannot be prescribed via ExTractApps Providers Seen During Your Hospitalization Provider Specialty Primary office phone Fredy Roa MD Orthopedic Surgery [phone] Your Primary Care Physician (PCP) Primary Care Physician Office Phone Office Fax Josselin Go [phone] You are allergic to the following Allergen Reactions Statins-Hmg-Coa Reductase Inhibitors Myalgia Myalgia with pravachol and multiple statins Codeine Rash Rash on thighs Darvon (Propoxyphene) Other (comments) \"I see worms\" Pcn (Penicillins) Rash  
    
 Sulfa (Sulfonamide Antibiotics) Rash Recent Documentation Height Weight BMI OB Status Smoking Status 1.626 m 86.6 kg 32.79 kg/m2 Hysterectomy Never Smoker Emergency Contacts Name Discharge Info Relation Home Work Mobile Agus Sneed CAREGIVER [3] Son [22] [phone] Patient Belongings The following personal items are in your possession at time of discharge: 
  Dental Appliances: Other (comment) (crowns upper front and molars)  Visual Aid: Glasses             Clothing: Other (comment) (clothing)    Other Valuables: Suitcase  Personal Items Sent to Safe: phone, money, credit cards Discharge Instructions Attachments/References HYPERLIPIDEMIA: HIGH CHOLESTEROL: GENERAL INFO (ENGLISH) Patient Handouts Learning About High Cholesterol What is high cholesterol? Cholesterol is a type of fat in your blood. It is needed for many body functions, such as making new cells. Cholesterol is made by your body. It also comes from food you eat. If you have too much cholesterol, it starts to build up in your arteries. This is called hardening of the arteries, or atherosclerosis. High cholesterol raises your risk of a heart attack and stroke. There are different types of cholesterol. LDL is the \"bad\" cholesterol. High LDL can raise your risk for heart disease, heart attack, and stroke. HDL is the \"good\" cholesterol. High HDL is linked with a lower risk for heart disease, heart attack, and stroke. Your cholesterol levels help your doctor find out your risk for having a heart attack or stroke. How can you prevent high cholesterol? A heart-healthy lifestyle can help you prevent high cholesterol. This lifestyle helps lower your risk for a heart attack and stroke. · Eat heart-healthy foods. ¨ Eat fruits, vegetables, whole grains (like oatmeal), dried beans and peas, nuts and seeds, soy products (like tofu), and fat-free or low-fat dairy products. ¨ Replace butter, margarine, and hydrogenated or partially hydrogenated oils with olive and canola oils. (Canola oil margarine without trans fat is fine.) ¨ Replace red meat with fish, poultry, and soy protein (like tofu). ¨ Limit processed and packaged foods like chips, crackers, and cookies. · Be active. Exercise can improve your cholesterol level. Get at least 30 minutes of exercise on most days of the week. Walking is a good choice. You also may want to do other activities, such as running, swimming, cycling, or playing tennis or team sports. · Stay at a healthy weight. Lose weight if you need to. · Don't smoke. If you need help quitting, talk to your doctor about stop-smoking programs and medicines. These can increase your chances of quitting for good. How is high cholesterol treated? The goal of treatment is to reduce your chances of having a heart attack or stroke. The goal is not to lower your cholesterol numbers only. · You may make lifestyle changes, such as eating healthy foods, not smoking, losing weight, and being more active. · You may have to take medicine. Follow-up care is a key part of your treatment and safety. Be sure to make and go to all appointments, and call your doctor if you are having problems. It's also a good idea to know your test results and keep a list of the medicines you take. Where can you learn more? Go to http://malia-lincoln.info/. Enter J705 in the search box to learn more about \"Learning About High Cholesterol. \" Current as of: May 10, 2017 Content Version: 11.7 © 8189-0495 [employer]ell[employer], rubberit. Care instructions adapted under license by [employer] (which disclaims liability or warranty for this information). If you have questions about a medical condition or this instruction, always ask your healthcare professional. [address] any warranty or liability for your use of this information. Please provide this summary of care documentation to your next provider. Signatures-by signing, you are acknowledging that this After Visit Summary has been reviewed with you and you have received a copy. Patient Signature:  ____________________________________________________________ Date:  ____________________________________________________________  
  
Jose Maria Berg Provider Signature:  ____________________________________________________________ Date:  ____________________________________________________________

## 2018-07-16 NOTE — OP NOTES
[address]   OPERATIVE REPORT    Collette Cramer  MR#: [id number]  : 1945  ACCOUNT #: [de-identified]   DATE OF SERVICE: 2018    PREOPERATIVE DIAGNOSIS:  Status post L2-S1 fusion; T12-L1, L1-L2 lumbar stenosis, kyphosis. POSTOPERATIVE DIAGNOSIS:  Status post L2-S1 fusion; T12-L1, L1-L2 lumbar stenosis, kyphosis. PROCEDURE PERFORMED:  Exploration of fusion with a posterior lumbar decompression, T12-L1, L1-L2, as well as a thoracolumbar fusion T10-S1 revision. Posterior Marcelino osteotomy at L1-L2. SURGEON:  Mary Jo Morales MD    ASSISTANT:  Shivani Bassett PA-C    ANESTHESIA: General     ESTIMATED BLOOD LOSS:  Minimal.    COMPLICATIONS:  Dural tear    SPECIMENS REMOVED:  None     IMPLANTS:  Orthofix Firebird NXG     INDICATIONS:  A pleasant 53-year-old female, chronic back pain, bilateral leg pain, previous fusion 10 years ago for scoliosis, now with degenerative changes at L1-L2. The patient for decompression as well as exploration of the fusion, possible L5-S1 loosening. PROCEDURE IN DETAIL:  The patient was identified, brought to the operative suite and underwent general anesthesia without difficulty. Mejia catheter placed. Preoperative nerve monitoring lines placed, baseline was obtained. The back was prepped and draped sterilely. Patient placed in the prone position on the open [facility] table. All bony prominences were well padded. Prepping and draping were performed. Incision made in the midline. Dissection was carried down, carefully dissected from approximately T10 down to L1. We also exposed the previous instrumentation hardware. Good fusion mass noted L2-L5. Some loosening of the S1 screws bilaterally. We carefully removed all the instrumentation without difficulty, and removed soft tissues off the L5-S1 facet joint and lateral gutters for subsequent revision fusion at that level.   At that time, we brought fluoroscopy in and then we placed using fluoroscopic technique pedicle screws stationed at T10, T11, T12. These were 40 mm screws at each level bilaterally, tested with ball-tipped probe as well as nerve monitoring, stable. L1 was not cannulated due to the small pedicle size noted. We then replaced the L2-L5 screws with 6.5 x 40 mm screws, and did 8.5 x 45 mm screws into the sacrum. Nerve monitoring was stable. We then started our revision laminectomy at T12, L1, L2 with removal of the remainder of the L1 spinous process. Central laminectomy was started with decompression, removal of the facets bilaterally. The patient had significant adhesion on the left-hand side. The patient had a linear dural tear after removal of scar tissue. This was carefully reapproximated with 6-0 [place]-Teodoro sutures. Posterior osteotomy was carried out with osteotomy through T12 for L1-L2 through the pars region bilaterally for enhanced kyphosis, enhanced lordosis. Patient had the wounds thoroughly irrigated. We decorticated around the L5-S1 segment as well as the previous fusion mass. decorticated the facets from T10 down to L1-L2. We placed Eufeima bone graft as well as Signafuse in the posterior, around the lateral gutters. Cobalt rods were bent to appropriate reduction. We then attached to the pedicle screws with set screws with set screws. Final tightening performed. Final x-rays demonstrated stable fixation. The patient had wounds thoroughly irrigated again, standard closure,  interrupted 0 Vicryl on the fascia layer, 2-0 Vicryl on the subcutaneous layer, and 3-0 Vicryl in skin. The patient returned to the PACU in stable condition.       MD Lelia Landis / ALEX  D: 07/16/2018 17:40     T: 07/16/2018 18:21  JOB #: [id number]

## 2018-07-16 NOTE — ANESTHESIA PREPROCEDURE EVALUATION
Anesthetic History No history of anesthetic complications Review of Systems / Medical History Patient summary reviewed, nursing notes reviewed and pertinent labs reviewed Pulmonary Within defined limits Neuro/Psych Within defined limits Cardiovascular Within defined limits Hypertension GI/Hepatic/Renal 
Within defined limits Endo/Other Within defined limits Diabetes Obesity Other Findings Comments: H/o ITP Physical Exam 
 
Airway Mallampati: II 
TM Distance: > 6 cm Neck ROM: normal range of motion Mouth opening: Normal 
 
 Cardiovascular Regular rate and rhythm,  S1 and S2 normal,  no murmur, click, rub, or gallop Dental 
 
Dentition: Caps/crowns Pulmonary Breath sounds clear to auscultation Abdominal 
GI exam deferred Other Findings Anesthetic Plan ASA: 2 Anesthesia type: general 
 
 
 
 
Induction: Intravenous Anesthetic plan and risks discussed with: Patient

## 2018-07-16 NOTE — IP AVS SNAPSHOT
[address] 
[phone] Patient: Conrado Jaramillo MRN: [id number] BEF:[id number] A check kaylie indicates which time of day the medication should be taken. My Medications START taking these medications Instructions Each Dose to Equal  
 Morning Noon Evening Bedtime  
 gabapentin 300 mg capsule Commonly known as:  NEURONTIN Your last dose was: Your next dose is: Take 1 Cap by mouth three (3) times daily. 300 mg  
    
   
   
   
  
 oxyCODONE IR 5 mg immediate release tablet Commonly known as:  Rosalba Ly Your last dose was: Your next dose is: Take 1-2 Tabs by mouth every four (4) hours as needed. Max Daily Amount: 60 mg.  
 5-10 mg CONTINUE taking these medications Instructions Each Dose to Equal  
 Morning Noon Evening Bedtime ACETYL-L-CARNITINE Your last dose was: Your next dose is: Take 500 mg by mouth daily. 500 mg ALLEGRA-D 24 HOUR 180-240 mg per tablet Generic drug:  fexofenadine-pseudoephedrine Your last dose was: Your next dose is: Take 1 Tab by mouth daily as needed. 1 Tab  
    
   
   
   
  
 buPROPion  mg tablet Commonly known as:  Jarad Spray Your last dose was: Your next dose is: TAKE 1 TABLET BY MOUTH EVERY DAY  
     
   
   
   
  
 CLARITIN-D 12 HOUR 5-120 mg per tablet Generic drug:  loratadine-pseudoephedrine Your last dose was: Your next dose is: Take 1 Tab by mouth two (2) times a day. 1 Tab  
    
   
   
   
  
 clindamycin 1 % topical gel Commonly known as:  CLINDAGEL Your last dose was: Your next dose is:    
   
   
 Apply  to affected area two (2) times a day. use thin film on affected area COREG 6.25 mg tablet Generic drug:  carvedilol Your last dose was: Your next dose is: Take 3.125 mg by mouth daily. 3.125 mg  
    
   
   
   
  
 ELOCON 0.1 % topical cream  
Generic drug:  mometasone Your last dose was: Your next dose is:    
   
   
 Apply  to affected area daily as needed. FINACEA 15 % topical gel Generic drug:  azelaic acid Your last dose was: Your next dose is:    
   
   
 Apply  to affected area two (2) times a day. hydroCHLOROthiazide 25 mg tablet Commonly known as:  HYDRODIURIL Your last dose was: Your next dose is: TAKE 1 TABLET BY MOUTH DAILY  
     
   
   
   
  
 levalbuterol 1.25 mg/0.5 mL Nebu Commonly known as:  Julia Mc Your last dose was: Your next dose is: 1.25 mg by Nebulization route as needed. 1.25 mg  
    
   
   
   
  
 metFORMIN  mg tablet Commonly known as:  GLUCOPHAGE XR Your last dose was: Your next dose is: Take one tablet in the morning with breakfast and one tablet in the evening with dinner. multivitamin tablet Commonly known as:  ONE A DAY Your last dose was: Your next dose is: Take 1 Tab by mouth daily. 1 Tab  
    
   
   
   
  
 oxazepam 15 mg capsule Commonly known as:  SERAX Your last dose was: Your next dose is: TAKE 2 CAPSULES AT BEDTIME  
     
   
   
   
  
 pravastatin 80 mg tablet Commonly known as:  PRAVACHOL Your last dose was: Your next dose is: Take 1 Tab by mouth nightly. 80 mg  
    
   
   
   
  
 sertraline 100 mg tablet Commonly known as:  ZOLOFT Your last dose was: Your next dose is: TAKE 1 TABLET BY MOUTH DAILY VENTOLIN HFA 90 mcg/actuation inhaler Generic drug:  albuterol Your last dose was: Your next dose is: Take  by inhalation every four (4) hours as needed. STOP taking these medications   
 diclofenac EC 75 mg EC tablet Commonly known as:  VOLTAREN Where to Get Your Medications Information on where to get these meds will be given to you by the nurse or doctor. ! Ask your nurse or doctor about these medications  
  gabapentin 300 mg capsule  
 oxyCODONE IR 5 mg immediate release tablet

## 2018-07-16 NOTE — PROGRESS NOTES
The following herbal, alternative, and/or nutritional/dietary supplement product(s) has been discontinued  per [facility] approved policy:    Levocarnitine (medication name/dose/frequency)    Please reorder upon discharge if appropriate.

## 2018-07-16 NOTE — PERIOP NOTES
1220 - Patient interviewed in holding area, patient identity, allergies, and procedure verified. Pt states no family present at this time, son will come after work. 1,000 mL of 0.9% Normal Saline added to sterile field for PRN irrigation throughout procedure. Surgifoam added to sterile field for application by MD. Lot #[id number]. Exp. Date:04/13/2022. DuralSeal (5mL) added to sterile field for application by MD.  Lot #[id number]. Exp. Date:01/31/2019.

## 2018-07-16 NOTE — PERIOP NOTES
TRANSFER - OUT REPORT:    Verbal report given to ANGELINE DOE(name) on Conrado Jaramillo  being transferred to [facility](unit) for routine post - op       Report consisted of patients Situation, Background, Assessment and   Recommendations(SBAR). Time Pre op antibiotic given:Y  Anesthesia Stop time: Y  Mejia Present on Transfer to floor:Y  Order for Mejia on Chart:Y  Discharge Prescriptions with Chart:N    Information from the following report(s) SBAR was reviewed with the receiving nurse. Opportunity for questions and clarification was provided. Is the patient on 02? YES       L/Min 15       Other     Is the patient on a monitor? NO    Is the nurse transporting with the patient? NO    Surgical Waiting Area notified of patient's transfer from PACU? YES      The following personal items collected during your admission accompanied patient upon transfer:   Dental Appliance: Dental Appliances: Other (comment) (crowns upper front and molars)  Vision:    Hearing Aid:    Jewelry:    Clothing: Clothing: Other (comment) (clothing)  Other Valuables:  Other Valuables: Suitcase  Valuables sent to safe: Personal Items Sent to Safe: phone, money, credit cards

## 2018-07-16 NOTE — BRIEF OP NOTE
BRIEF OPERATIVE NOTE    Date of Procedure: 7/16/2018   Preoperative Diagnosis: Bilateral stenosis of lateral recess of lumbar spine [M48.061]  Postoperative Diagnosis: Bilateral stenosis of lateral recess of lumbar spine [M48.061]    Procedure(s):  L1-2 LAMINECTOMY WITH RODNEY OSTEOTOMY WITH EXTENSION OF FUSION TO T10 (OXYGEN), DURA REPAIR  Surgeon(s) and Role:     * Army Marcy MD - Primary         Surgical Assistant: Marie SANDERS-CESAR    Surgical Staff:  Circ-1: Fior Hughes RN  Circ-Relief: Bernardino Reyes RN; Lisa Gaming RN  Physician Assistant: MARILYN Benitez  Radiology Technician: Nadia Camacho RT, R  Scrub RN-1: Gertrude Benjamin RN  Scrub RN-Relief: Jaquelin Phillips RN  Event Time In   Incision Start 1321   Incision Close 1707     Anesthesia: General   Estimated Blood Loss: See full op note  Specimens: * No specimens in log *   Findings: Lumbar stenosis   Complications: See full op note  Implants:   Implant Name Type Inv.  Item Serial No.  Lot No. LRB No. Used Action   GRAFT BNE ELITE EMIR LG --  - [id number]  GRAFT BNE ELITE EMIR LG --  [id number] MUSCULOSKELETAL TRANS 0010 N/A 1 Implanted   GRAFT BNE ELITE EMIR LG --  - [id number]  GRAFT BNE ELITE EMIR LG --  [id number] MUSCULOSKELETAL TRANS 0010 N/A 1 Implanted   GRAFT BNE ELITE EMIR LG --  - [id number]  GRAFT BNE ELITE EMIR LG --  [id number] MUSCULOSKELETAL TRANS 0010 N/A 1 Implanted   SIGNAFUSE STANDARD 15g   N/A  [id number] N/A 3 Implanted   GRAFT BNE BIOACTV INTERFC 1GM -- INTERFACE - SN/A  GRAFT BNE BIOACTV INTERFC 1GM -- INTERFACE N/A [employer] [id number] N/A 3 Implanted   SCR SPNE PDCL BNE ST 8.5X45 -- FIREBIRD - SN/A  SCR SPNE PDCL BNE ST 8.5X45 -- FIREBIRD N/A ORTHOFIX SPINAL IMPLANTS N/A N/A 2 Implanted   6.5 X 40MM SCREW Screw  N/A  N/A N/A 8 Implanted   SCR SPNE PDCL BNE ST 5.5X40 -- FIREBIRD - SN/A  SCR SPNE PDCL BNE ST 5.5X40 -- FIREBIRD N/A ORTHOFIX SPINAL IMPLANTS N/A N/A 6 Implanted   SET SCREW -- FIREBIRD NXG - SN/A  SET SCREW -- FIREBIRD NXG N/A ORTHOFIX SPINAL IMPLANTS N/A N/A 16 Implanted   BODY REDUC TI -- FIREBIRD NXG - SN/A  BODY REDUC TI -- FIREBIRD NXG N/A ORTHOFIX SPINAL IMPLANTS N/A N/A 16 Implanted   GRAFT DURA REGEN MATRX 2X2IN -- DURAGEN PLUS - [id number]  GRAFT DURA REGEN MATRX 2X2IN -- DURAGEN PLUS [id number] [employer] [facility] [id number] N/A 1 Implanted   DRU STR HEX-END 450MM COCR --  - SN/A  DRU STR HEX-END 450MM COCR --  N/A ORTHOFIX SPINAL IMPLANTS N/A N/A 2 Implanted   CONN CROSS MULTAXL 50MM TI -- SFS - SN/A   CONN CROSS MULTAXL 50MM TI -- SFS N/A ORTHOFIX SPINAL IMPLANTS N/A N/A 1 Implanted

## 2018-07-17 LAB
ANION GAP SERPL CALC-SCNC: 10 MMOL/L (ref 5–15)
BUN SERPL-MCNC: 19 MG/DL (ref 6–20)
BUN/CREAT SERPL: 25 (ref 12–20)
CALCIUM SERPL-MCNC: 7.8 MG/DL (ref 8.5–10.1)
CHLORIDE SERPL-SCNC: 107 MMOL/L (ref 97–108)
CO2 SERPL-SCNC: 24 MMOL/L (ref 21–32)
CREAT SERPL-MCNC: 0.76 MG/DL (ref 0.55–1.02)
GLUCOSE BLD STRIP.AUTO-MCNC: 152 MG/DL (ref 65–100)
GLUCOSE BLD STRIP.AUTO-MCNC: 157 MG/DL (ref 65–100)
GLUCOSE BLD STRIP.AUTO-MCNC: 161 MG/DL (ref 65–100)
GLUCOSE BLD STRIP.AUTO-MCNC: 175 MG/DL (ref 65–100)
GLUCOSE SERPL-MCNC: 154 MG/DL (ref 65–100)
HGB BLD-MCNC: 11.1 G/DL (ref 11.5–16)
POTASSIUM SERPL-SCNC: 3.7 MMOL/L (ref 3.5–5.1)
SERVICE CMNT-IMP: ABNORMAL
SODIUM SERPL-SCNC: 141 MMOL/L (ref 136–145)

## 2018-07-17 PROCEDURE — 74011250636 HC RX REV CODE- 250/636: Performed by: NURSE PRACTITIONER

## 2018-07-17 PROCEDURE — 94760 N-INVAS EAR/PLS OXIMETRY 1: CPT

## 2018-07-17 PROCEDURE — 82962 GLUCOSE BLOOD TEST: CPT

## 2018-07-17 PROCEDURE — 36415 COLL VENOUS BLD VENIPUNCTURE: CPT | Performed by: PHYSICIAN ASSISTANT

## 2018-07-17 PROCEDURE — 74011636637 HC RX REV CODE- 636/637: Performed by: PHYSICIAN ASSISTANT

## 2018-07-17 PROCEDURE — 74011250637 HC RX REV CODE- 250/637: Performed by: PHYSICIAN ASSISTANT

## 2018-07-17 PROCEDURE — 74011250637 HC RX REV CODE- 250/637: Performed by: NURSE PRACTITIONER

## 2018-07-17 PROCEDURE — 77010033678 HC OXYGEN DAILY

## 2018-07-17 PROCEDURE — 74011250636 HC RX REV CODE- 250/636: Performed by: PHYSICIAN ASSISTANT

## 2018-07-17 PROCEDURE — 85018 HEMOGLOBIN: CPT | Performed by: PHYSICIAN ASSISTANT

## 2018-07-17 PROCEDURE — 80048 BASIC METABOLIC PNL TOTAL CA: CPT | Performed by: PHYSICIAN ASSISTANT

## 2018-07-17 PROCEDURE — 65270000032 HC RM SEMIPRIVATE

## 2018-07-17 RX ORDER — BENZONATATE 100 MG/1
100 CAPSULE ORAL
Status: DISCONTINUED | OUTPATIENT
Start: 2018-07-17 | End: 2018-07-25 | Stop reason: HOSPADM

## 2018-07-17 RX ADMIN — ACETAMINOPHEN 650 MG: 325 TABLET, FILM COATED ORAL at 06:48

## 2018-07-17 RX ADMIN — Medication 10 ML: at 21:55

## 2018-07-17 RX ADMIN — OXAZEPAM 15 MG: 15 CAPSULE, GELATIN COATED ORAL at 21:55

## 2018-07-17 RX ADMIN — INSULIN LISPRO 2 UNITS: 100 INJECTION, SOLUTION INTRAVENOUS; SUBCUTANEOUS at 17:28

## 2018-07-17 RX ADMIN — LORATADINE, PSEUDOEPHEDRINE SULFATE 1 TABLET: 5; 120 TABLET, FILM COATED, EXTENDED RELEASE ORAL at 09:51

## 2018-07-17 RX ADMIN — PRAVASTATIN SODIUM 80 MG: 40 TABLET ORAL at 00:28

## 2018-07-17 RX ADMIN — SERTRALINE HYDROCHLORIDE 100 MG: 50 TABLET ORAL at 09:30

## 2018-07-17 RX ADMIN — BUPROPION HYDROCHLORIDE 150 MG: 150 TABLET, EXTENDED RELEASE ORAL at 09:30

## 2018-07-17 RX ADMIN — OXYCODONE HYDROCHLORIDE 10 MG: 5 TABLET ORAL at 12:13

## 2018-07-17 RX ADMIN — SODIUM CHLORIDE 500 ML: 900 INJECTION, SOLUTION INTRAVENOUS at 08:00

## 2018-07-17 RX ADMIN — CLINDAMYCIN PHOSPHATE: 10 GEL TOPICAL at 09:32

## 2018-07-17 RX ADMIN — PRAVASTATIN SODIUM 80 MG: 40 TABLET ORAL at 21:55

## 2018-07-17 RX ADMIN — ACETAMINOPHEN 650 MG: 325 TABLET, FILM COATED ORAL at 17:28

## 2018-07-17 RX ADMIN — Medication 10 ML: at 00:31

## 2018-07-17 RX ADMIN — MULTIPLE VITAMINS W/ MINERALS TAB 1 TABLET: TAB at 09:30

## 2018-07-17 RX ADMIN — OXYCODONE HYDROCHLORIDE 10 MG: 5 TABLET ORAL at 09:30

## 2018-07-17 RX ADMIN — KETOROLAC TROMETHAMINE 15 MG: 30 INJECTION, SOLUTION INTRAMUSCULAR; INTRAVENOUS at 12:13

## 2018-07-17 RX ADMIN — KETOROLAC TROMETHAMINE 15 MG: 30 INJECTION, SOLUTION INTRAMUSCULAR; INTRAVENOUS at 06:49

## 2018-07-17 RX ADMIN — CLINDAMYCIN PHOSPHATE: 10 GEL TOPICAL at 17:30

## 2018-07-17 RX ADMIN — INSULIN LISPRO 2 UNITS: 100 INJECTION, SOLUTION INTRAVENOUS; SUBCUTANEOUS at 06:49

## 2018-07-17 RX ADMIN — ACETAMINOPHEN 650 MG: 325 TABLET, FILM COATED ORAL at 00:28

## 2018-07-17 RX ADMIN — VANCOMYCIN HYDROCHLORIDE 1750 MG: 10 INJECTION, POWDER, LYOPHILIZED, FOR SOLUTION INTRAVENOUS at 01:59

## 2018-07-17 RX ADMIN — INSULIN LISPRO 2 UNITS: 100 INJECTION, SOLUTION INTRAVENOUS; SUBCUTANEOUS at 12:12

## 2018-07-17 RX ADMIN — ACETAMINOPHEN 650 MG: 325 TABLET, FILM COATED ORAL at 23:42

## 2018-07-17 RX ADMIN — ACETAMINOPHEN 650 MG: 325 TABLET, FILM COATED ORAL at 12:13

## 2018-07-17 RX ADMIN — BENZONATATE 100 MG: 100 CAPSULE ORAL at 09:51

## 2018-07-17 RX ADMIN — OXYCODONE HYDROCHLORIDE 10 MG: 5 TABLET ORAL at 17:28

## 2018-07-17 RX ADMIN — CYCLOBENZAPRINE HYDROCHLORIDE 10 MG: 10 TABLET, FILM COATED ORAL at 09:51

## 2018-07-17 RX ADMIN — KETOROLAC TROMETHAMINE 15 MG: 30 INJECTION, SOLUTION INTRAMUSCULAR; INTRAVENOUS at 00:29

## 2018-07-17 RX ADMIN — Medication 10 ML: at 06:49

## 2018-07-17 RX ADMIN — METFORMIN HYDROCHLORIDE 500 MG: 500 TABLET, EXTENDED RELEASE ORAL at 17:28

## 2018-07-17 RX ADMIN — OXYCODONE HYDROCHLORIDE 10 MG: 5 TABLET ORAL at 21:55

## 2018-07-17 NOTE — PROGRESS NOTES
TRANSFER - IN REPORT:    Verbal report received from Pauline Watt RN(name) on Roberto Mcintosh  being received from [facility]) for routine post - op      Report consisted of patients Situation, Background, Assessment and   Recommendations(SBAR). Information from the following report(s) SBAR, Kardex, OR Summary, Intake/Output and MAR was reviewed with the receiving nurse Minus Cesar RN. Opportunity for questions and clarification was provided. Assessment completed upon patients arrival to unit and care assumed.

## 2018-07-17 NOTE — PROGRESS NOTES
Primary Nurse Alycia Potts RN and Sandee Augustin RN performed a dual skin assessment on this patient No impairment noted  Hari score is 20

## 2018-07-17 NOTE — PROGRESS NOTES
Orthopedic Spine Progress Note  Post Op day: 1 Day Post-Op    2018 7:39 AM   Admit Date: 2018  Procedure: Procedure(s):  L1-2 LAMINECTOMY WITH RODNEY OSTEOTOMY WITH EXTENSION OF FUSION TO T10 (OXYGEN), DURA REPAIR    Subjective:     Elton Contreras appears well. Pain seems to be managed. She is on complete bedrest. No headache. No N/V  Mejia in place    Pain Control:   Pain Assessment  Pain Scale 1: Numeric (0 - 10)  Pain Intensity 1: 5  Pain Onset 1: postop  Pain Location 1: Back  Pain Orientation 1: Lower, Mid  Pain Description 1: Aching  Pain Intervention(s) 1: Encouraged PCA, Emotional support, Repositioned, Rest    Objective:          Physical Exam:  General:  Alert and oriented. No acute distress. Heart:  Respirations unlabored. Abdomen:   Extremities: Soft, non-tender. No evidence of cyanosis. Pulses palpable in both upper and lower extremities. Neurologic:  Musculoskeletal:  No new motor deficits. Neurovascular exam within normal limits. Sensation stable. Motor: unchanged C5-T1 and L2-S1. Ez's sign negative in bilateral lower extremities. Calves soft, nontender upon palpation and with passive twitch. Moves both upper and lower extremities. Incision: clean, dry, and intact. No significant erythema or swelling. No active drainage noted. Vital Signs:    Blood pressure 103/68, pulse 79, temperature 98 °F (36.7 °C), resp. rate 16, height 5' 4\" (1.626 m), weight 86.6 kg (191 lb), SpO2 95 %.   Temp (24hrs), Av.1 °F (36.7 °C), Min:97.6 °F (36.4 °C), Max:98.5 °F (36.9 °C)      LAB:    Recent Labs      18   0450   HGB  11.1*     Lab Results   Component Value Date/Time    Sodium 141 2018 04:50 AM    Potassium 3.7 2018 04:50 AM    Chloride 107 2018 04:50 AM    CO2 24 2018 04:50 AM    Glucose 154 (H) 2018 04:50 AM    BUN 19 2018 04:50 AM    Creatinine 0.76 2018 04:50 AM    Calcium 7.8 (L) 2018 04:50 AM Intake/Output:   07/15 1901 - 07/17 0700  In: 2724 [I.V.:2724]  Out: 465 [Urine:415]      Assessment:   Patient is 1 Day Post-Op s/p Procedure(s):  L1-2 LAMINECTOMY WITH RODNEY OSTEOTOMY WITH EXTENSION OF FUSION TO T10 (OXYGEN), DURA REPAIR    Plan:     1. Remain flat this morning. After lunch, slowly elevate the head of the bed 30, 45 and 60 degrees. If at any point she  complains of a headache, put down flat again until tomorrow. No PT today. 2.  D/C PCA and begin established methods of pain control  3. VTE Prophylaxes - TEDS & SCDs   4. Encouraged use of ICS  5.   Discharge pending    Signed By: Kt Kimble PA-C

## 2018-07-17 NOTE — PROGRESS NOTES
Bedside and Verbal shift change report given to Shanell RN (oncoming nurse) by Duy Diaz RN (offgoing nurse). Report included the following information SBAR, Kardex, OR Summary, Intake/Output, MAR and Recent Results.

## 2018-07-17 NOTE — DISCHARGE INSTRUCTIONS
After Hospital Care Plan:  Discharge Instructions Lumbar Fusion Surgery   Dr. Adrianna Oconnor   Patient Name: Sascha Johnson    Date of procedure: 7/16/2018  Date of discharge:     Procedure: Procedure(s):  L1-2 LAMINECTOMY WITH RODNEY OSTEOTOMY WITH EXTENSION OF FUSION TO T10 (OXYGEN), DURA REPAIR  PCP: Soha Rodriguez MD    Follow up appointments  -follow up with Dr. Dr. Adrianna Oconnor in 2 weeks. Call [phone] to make an appointment as soon as you get home from the hospital.    [address] [place], [place] [address]y: ____________________   phone: _______________________  The agency will contact you to arrange dates/times for visits. Please call them if you do not hear from them within 24 hours after you are discharged  Physical therapy 3 times a week for 3 weeks  Nursing-initial assessment and as needed    When to call your Orthopaedic Surgeon:  -Signs of infection-if your incision is red; continues to have drainage; drainage has a foul odor or if you have a persistent fever over 101 degrees for 24 hours  -Nausea or vomiting, severe headache  -Loss of bowel or bladder function, inability to urinate  -Changes in sensation in your arms or legs (numbness, tingling, loss of color)  -Increased weakness-greater than before your surgery  -Severe pain or pain not relieved by medications  -Signs of a blood clot in your leg-calf pain, tenderness, redness, swelling of lower leg    When to call your Primary Care Physician:  -Concerns about medical conditions such as diabetes, high blood pressure, asthma, congestive heart failure  -Call if blood sugars are elevated, persistent headache or dizziness, coughing or congestion, constipation or diarrhea, burning with urination, abnormal heart rate    When to call 911 and go to the nearest emergency room:  -Acute onset of chest pain, shortness of breath, difficulty breathing    Activity  -You are going home a well person, be as active as possible. Your only exercise should be walking.   Start with short frequent walks and increase your walking distance each day.  -Limit the amount of time you sit to 20-30 minute intervals. Sitting for prolonged periods of time will be uncomfortable for you following surgery.  -Do NOT lift anything over 5 pounds  -Do NOT do any straining, twisting or bending  -When you are in bed, you may lay on your back or on either side. Do NOT lie on your stomach    Brace  -If you have a back brace, you should wear your brace at all times when you are out of bed. Do not wear the brace while in bed or showering.  -Remember to always wear a cotton t-shirt underneath your brace.  -Do not bend or twist when your brace is off    Diet  -Resume usual diet; drink plenty of fluids; eat foods high in fiber  -It is important to have regular bowel movements. Pain medications may cause constipation. You may want to take a stool softener (such as Senokot-S or Colace) to prevent constipation.   -If constipation occurs, take a laxative (such as Dulcolax tablets, Milk of Magnesia, or a suppository). Laxatives should only be used if the above preventable measures have failed and you still have not had a bowel movement after three days    Driving  -You may not drive or return to work until instructed by your physician. However, you may ride in the car for short periods of time. Incision Care  Your incision has been closed with absorbable sutures and the Zipline skin closure system. This will assist with healing. The Claire Rowels is to remain on your incision for 2 weeks. A dry dressing (ABD and tape) will be placed over it and should be changed daily, for at least the first several days after your surgery. If you have no incisional drainage, you may leave the incision open to air if you wish, still leaving the Zipline in place. Please make sure to wash your hands prior to touching your dressing. You may take brief showers but do not run the water directly onto the wound.  After your shower, blot your incision dry with a soft towel and replace the dry dressing. Do not allow the tape to come in contact with the Zipline. Do not rub or apply any lotions or ointments to your incision site. Do not soak or scrub your wound. The Zipline dressing will be removed during your two week follow-up appointment. If you experience drainage leaking from underneath the Zipline or if it peels off before 2 weeks, please contact your orthopedic surgeons office. Showering  -You may shower in approximately 4 days after your surgery.    -Leave the dressing on during your shower. Do NOT allow the water to run directly onto your dressing. Once you get out of the shower, gently pat the dressing dry. -Reminder- your brace can be removed while showering. Remember to not bend or twist while your brace is off.    -Do not take a tub bath. Preventing blood clots  -You have been given T.E.D. stockings to wear. Continue to wear these for 7 days after your discharge. Put them on in the morning and take them off at night.    -They are used to increase your circulation and prevent blood clots from forming in your legs  -T. E.D. stockings can be machine washed, temperature not to exceed 160° F (71°C) and machine dried for 15 to 20 minutes, temperature not to exceed 250° F (121°C). Pain management  -Take pain medication as prescribed; decrease the amount you use as your pain lessens  -DO not wait until you are in extreme pain to take your medication.  -Avoid alcoholic beverages while taking pain medication    Pain Medication Safety  DO:  -Read the Medication Guide   -Take your medicine exactly as prescribed   -Store your medicine away from children and in a safe place   -Flush unused medicine down the toilet   -Call your healthcare provider for medical advice about side effects. You may report side effects to FDA at 1-041-FDA-2607.   -Please be aware that many medications contain Tylenol.   We do not want you to over medicate so please read the information below as a guide. Do not take more than 4 Grams of Tylenol in a 24 hour period. (There are 1000 milligrams in one Gram)                                                                                                                                                                                                                                                Percocet contains 325 mg of Tylenol per tablet (do not take more than 12 tablets in 24 hours)  Lortab contains 500 mg of Tylenol per tablet (do not take more than 8 tablets in 24 hours)  Norco contains 325 mg of Tylenol per tablet (do not take more than 12 tablets in 24 hours). DO NOT:  -Do not give your medicine to others   -Do not take medicine unless it was prescribed for you   -Do not stop taking your medicine without talking to your healthcare provider   -Do not break, chew, crush, dissolve, or inject your medicine. If you cannot swallow your medicine whole, talk to your healthcare provider.  -Do not drink alcohol while taking this medicine  -Do not take anti-inflammatory medications or aspirin unless instructed by your     physician.

## 2018-07-17 NOTE — ANESTHESIA POSTPROCEDURE EVALUATION
Post-Anesthesia Evaluation and Assessment    Patient: Columba Avelar MRN: [id number]  SSN: [id number]    YOB: 1945  Age: 72 y.o. Sex: female       Cardiovascular Function/Vital Signs  Visit Vitals    /68 (BP 1 Location: Right arm, BP Patient Position: At rest)    Pulse 79    Temp 36.7 °C (98 °F)    Resp 16    Ht 5' 4\" (1.626 m)    Wt 86.6 kg (191 lb)    SpO2 95%    BMI 32.79 kg/m2       Patient is status post general anesthesia for Procedure(s):  L1-2 LAMINECTOMY WITH RODNEY OSTEOTOMY WITH EXTENSION OF FUSION TO T10 (OXYGEN), DURA REPAIR. Nausea/Vomiting: None    Postoperative hydration reviewed and adequate. Pain:  Pain Scale 1: Numeric (0 - 10) (07/17/18 0449)  Pain Intensity 1: 5 (07/17/18 0449)   Managed    Neurological Status:   Neuro (WDL): Exceptions to WDL (strong  and dorsal pedal flexion) (07/16/18 1832)  Neuro  Neurologic State: Alert; Appropriate for age;Eyes open spontaneously (07/16/18 1935)  Orientation Level: Oriented X4 (07/16/18 1935)  Cognition: Appropriate decision making; Appropriate for age attention/concentration; Appropriate safety awareness; Follows commands (07/16/18 1935)  Speech: Clear (07/16/18 1935)  LUE Motor Response: Purposeful (07/16/18 1935)  LLE Motor Response: Purposeful (07/16/18 1935)  RUE Motor Response: Purposeful (07/16/18 1935)  RLE Motor Response: Purposeful (07/16/18 1935)   At baseline    Mental Status and Level of Consciousness: Arousable    Pulmonary Status:   O2 Device: Nasal cannula (07/17/18 0449)   Adequate oxygenation and airway patent    Complications related to anesthesia: None    Post-anesthesia assessment completed.  No concerns    Signed By: Marlise Hodgkin, MD     July 17, 2018

## 2018-07-17 NOTE — ROUTINE PROCESS
Bedside and Verbal shift change report given to [facility] (oncoming nurse) by Pradeep (offgoing nurse). Report included the following information SBAR, Kardex, OR Summary, Intake/Output, MAR, Accordion and Recent Results.

## 2018-07-17 NOTE — PROGRESS NOTES
Care Management Interventions  PCP Verified by CM: Yes  Palliative Care Criteria Met (RRAT>21 & CHF Dx)?: No  Transition of Care Consult (CM Consult): Discharge Planning  MyChart Signup: No  Discharge Durable Medical Equipment: No  Physical Therapy Consult: Yes  Occupational Therapy Consult: Yes  Speech Therapy Consult: No  Current Support Network: Own Home  Confirm Follow Up Transport: Family  Plan discussed with Pt/Family/Caregiver: Yes  Freedom of Choice Offered: Yes   Resource Information Provided?: No  Discharge Location  Discharge Placement: Rehab hospital/unit acute    Reason for Admission:   Scheduled surgery: L1-2 LAMINECTOMY WITH RODNEY OSTEOTOMY WITH EXTENSION OF FUSION TO T10 (OXYGEN), DURA REPAIR               RRAT Score:   35               Resources/supports as identified by patient/family:   Close friends                Top Challenges facing patient (as identified by patient/family and CM): Finances/Medication cost?  No concerns, private insurance                  Transportation? Son or friend will transport home              Support system or lack thereof? See above                     Living arrangements? Lives alone           Self-care/ADLs/Cognition? Independent prior to admission, oriented x4                                  Plan for utilizing home health:   pending                       Likelihood of readmission: low                 Transition of Care Plan:   Cm met with patient to discuss discharge planning. Patient stated that she was very concerned about going home, as she lives alone and has no one who can stay with her. Patient will start working with therapy tomorrow, and cm informed that based on their recommendations, she may need to go to rehab. If so, patient would prefer to go to [address] [place] [address]. Patient is currently on bedrest, will follow.   [employer] , [place]

## 2018-07-18 LAB
GLUCOSE BLD STRIP.AUTO-MCNC: 129 MG/DL (ref 65–100)
GLUCOSE BLD STRIP.AUTO-MCNC: 130 MG/DL (ref 65–100)
GLUCOSE BLD STRIP.AUTO-MCNC: 163 MG/DL (ref 65–100)
GLUCOSE BLD STRIP.AUTO-MCNC: 166 MG/DL (ref 65–100)
HGB BLD-MCNC: 10.1 G/DL (ref 11.5–16)
SERVICE CMNT-IMP: ABNORMAL

## 2018-07-18 PROCEDURE — 85018 HEMOGLOBIN: CPT | Performed by: PHYSICIAN ASSISTANT

## 2018-07-18 PROCEDURE — 74011250637 HC RX REV CODE- 250/637: Performed by: PHYSICIAN ASSISTANT

## 2018-07-18 PROCEDURE — 82962 GLUCOSE BLOOD TEST: CPT

## 2018-07-18 PROCEDURE — 36415 COLL VENOUS BLD VENIPUNCTURE: CPT | Performed by: PHYSICIAN ASSISTANT

## 2018-07-18 PROCEDURE — 74011250637 HC RX REV CODE- 250/637: Performed by: ORTHOPAEDIC SURGERY

## 2018-07-18 PROCEDURE — 74011636637 HC RX REV CODE- 636/637: Performed by: PHYSICIAN ASSISTANT

## 2018-07-18 PROCEDURE — 74011250636 HC RX REV CODE- 250/636: Performed by: NURSE PRACTITIONER

## 2018-07-18 PROCEDURE — 65270000032 HC RM SEMIPRIVATE

## 2018-07-18 RX ORDER — OXAZEPAM 15 MG/1
15 CAPSULE ORAL
Status: DISCONTINUED | OUTPATIENT
Start: 2018-07-18 | End: 2018-07-25 | Stop reason: HOSPADM

## 2018-07-18 RX ORDER — DIAZEPAM 5 MG/1
5 TABLET ORAL
Status: DISCONTINUED | OUTPATIENT
Start: 2018-07-18 | End: 2018-07-25 | Stop reason: HOSPADM

## 2018-07-18 RX ADMIN — OXYCODONE HYDROCHLORIDE 10 MG: 5 TABLET ORAL at 06:50

## 2018-07-18 RX ADMIN — OXYCODONE HYDROCHLORIDE 10 MG: 5 TABLET ORAL at 20:19

## 2018-07-18 RX ADMIN — ACETAMINOPHEN 650 MG: 325 TABLET, FILM COATED ORAL at 23:43

## 2018-07-18 RX ADMIN — OXYCODONE HYDROCHLORIDE 10 MG: 5 TABLET ORAL at 16:36

## 2018-07-18 RX ADMIN — Medication 10 ML: at 06:49

## 2018-07-18 RX ADMIN — ACETAMINOPHEN 650 MG: 325 TABLET, FILM COATED ORAL at 06:49

## 2018-07-18 RX ADMIN — CLINDAMYCIN PHOSPHATE: 10 GEL TOPICAL at 09:43

## 2018-07-18 RX ADMIN — Medication 10 ML: at 21:31

## 2018-07-18 RX ADMIN — DIAZEPAM 5 MG: 5 TABLET ORAL at 21:31

## 2018-07-18 RX ADMIN — OXYCODONE HYDROCHLORIDE 10 MG: 5 TABLET ORAL at 03:02

## 2018-07-18 RX ADMIN — SODIUM CHLORIDE 500 ML: 900 INJECTION, SOLUTION INTRAVENOUS at 09:34

## 2018-07-18 RX ADMIN — CLINDAMYCIN PHOSPHATE: 10 GEL TOPICAL at 18:50

## 2018-07-18 RX ADMIN — CYCLOBENZAPRINE HYDROCHLORIDE 10 MG: 10 TABLET, FILM COATED ORAL at 16:36

## 2018-07-18 RX ADMIN — INSULIN LISPRO 2 UNITS: 100 INJECTION, SOLUTION INTRAVENOUS; SUBCUTANEOUS at 13:15

## 2018-07-18 RX ADMIN — METFORMIN HYDROCHLORIDE 500 MG: 500 TABLET, EXTENDED RELEASE ORAL at 16:36

## 2018-07-18 RX ADMIN — PRAVASTATIN SODIUM 80 MG: 40 TABLET ORAL at 21:31

## 2018-07-18 RX ADMIN — SERTRALINE HYDROCHLORIDE 100 MG: 50 TABLET ORAL at 09:40

## 2018-07-18 RX ADMIN — ACETAMINOPHEN 650 MG: 325 TABLET, FILM COATED ORAL at 18:50

## 2018-07-18 RX ADMIN — BUPROPION HYDROCHLORIDE 150 MG: 150 TABLET, EXTENDED RELEASE ORAL at 09:41

## 2018-07-18 RX ADMIN — MULTIPLE VITAMINS W/ MINERALS TAB 1 TABLET: TAB at 09:41

## 2018-07-18 RX ADMIN — OXYCODONE HYDROCHLORIDE 10 MG: 5 TABLET ORAL at 13:15

## 2018-07-18 RX ADMIN — ACETAMINOPHEN 650 MG: 325 TABLET, FILM COATED ORAL at 13:15

## 2018-07-18 RX ADMIN — OXYCODONE HYDROCHLORIDE 10 MG: 5 TABLET ORAL at 23:43

## 2018-07-18 RX ADMIN — INSULIN LISPRO 2 UNITS: 100 INJECTION, SOLUTION INTRAVENOUS; SUBCUTANEOUS at 06:50

## 2018-07-18 NOTE — ROUTINE PROCESS
Bedside and Verbal shift change report given to [facility] (oncoming nurse) by Beatrice Ca (offgoing nurse). Report included the following information SBAR, Kardex, OR Summary, Intake/Output, MAR, Accordion and Recent Results.

## 2018-07-18 NOTE — PROGRESS NOTES
Orthopedic Spine Progress Note  Post Op day: 2 Days Post-Op    2018 8:01 AM   Admit Date: 2018  Procedure: Procedure(s):  L1-2 LAMINECTOMY WITH RODNEY OSTEOTOMY WITH EXTENSION OF FUSION TO T10 (OXYGEN), DURA REPAIR    Subjective:     Conrado Jaramillo appears well. Pain seems to be managed. Lying flat. Nurse reports patient was complaining of frontal headache with head elevation. Nurse educated and encouraged patient to remain flat, but she continued to raise the [facility] on her own and would continue to complain of headache. Nurse reports drainage. Long discussion with patient about the importance of lying flat until the drainage resolves. No N/V  Mejia in place. Pain Control:   Pain Assessment  Pain Scale 1: Numeric (0 - 10)  Pain Intensity 1: 4  Pain Onset 1: post op  Pain Location 1: Back  Pain Orientation 1: Lower  Pain Description 1: Aching  Pain Intervention(s) 1: Medication (see MAR)    Objective:          Physical Exam:  General:  Alert and oriented. No acute distress. Heart:  Respirations unlabored. Abdomen:   Extremities: Soft, non-tender. No evidence of cyanosis. Pulses palpable in both upper and lower extremities. Neurologic:  Musculoskeletal:  No new motor deficits. Neurovascular exam within normal limits. Sensation stable. Motor: unchanged C5-T1 and L2-S1. Ez's sign negative in bilateral lower extremities. Calves soft, nontender upon palpation and with passive twitch. Moves both upper and lower extremities. Incision: clean, dry, and intact. No significant erythema or swelling. No active drainage noted. Vital Signs:    Blood pressure 102/56, pulse 95, temperature (!) 100.9 °F (38.3 °C), resp. rate 18, height 5' 4\" (1.626 m), weight 86.6 kg (191 lb), SpO2 90 %.   Temp (24hrs), Av.5 °F (37.5 °C), Min:98.6 °F (37 °C), Max:100.9 °F (38.3 °C)      LAB:    Recent Labs      18   0310   HGB  10.1*     Lab Results   Component Value Date/Time    Sodium 141 07/17/2018 04:50 AM    Potassium 3.7 07/17/2018 04:50 AM    Chloride 107 07/17/2018 04:50 AM    CO2 24 07/17/2018 04:50 AM    Glucose 154 (H) 07/17/2018 04:50 AM    BUN 19 07/17/2018 04:50 AM    Creatinine 0.76 07/17/2018 04:50 AM    Calcium 7.8 (L) 07/17/2018 04:50 AM       Intake/Output:   07/16 1901 - 07/18 0700  In: 724 [I.V.:724]  Out: 425 [Urine:425]    Assessment:   Patient is 2 Days Post-Op s/p Procedure(s):  L1-2 LAMINECTOMY WITH RODNEY OSTEOTOMY WITH EXTENSION OF FUSION TO T10 (OXYGEN), DURA REPAIR    Plan:     1. Continue to remain completely flat until bandages are dry   2. Continue established methods of pain control  3. VTE Prophylaxes - TEDS & SCDs   4. Encouraged use of ICS  5.   Discharge pending      Signed By: Kt Kimble PA-C

## 2018-07-19 LAB
ANION GAP SERPL CALC-SCNC: 6 MMOL/L (ref 5–15)
BASOPHILS # BLD: 0 K/UL (ref 0–0.1)
BASOPHILS NFR BLD: 0 % (ref 0–1)
BUN SERPL-MCNC: 7 MG/DL (ref 6–20)
BUN/CREAT SERPL: 12 (ref 12–20)
CALCIUM SERPL-MCNC: 8.2 MG/DL (ref 8.5–10.1)
CHLORIDE SERPL-SCNC: 105 MMOL/L (ref 97–108)
CO2 SERPL-SCNC: 27 MMOL/L (ref 21–32)
CREAT SERPL-MCNC: 0.57 MG/DL (ref 0.55–1.02)
DIFFERENTIAL METHOD BLD: ABNORMAL
EOSINOPHIL # BLD: 0.1 K/UL (ref 0–0.4)
EOSINOPHIL NFR BLD: 1 % (ref 0–7)
ERYTHROCYTE [DISTWIDTH] IN BLOOD BY AUTOMATED COUNT: 12.7 % (ref 11.5–14.5)
GLUCOSE BLD STRIP.AUTO-MCNC: 112 MG/DL (ref 65–100)
GLUCOSE BLD STRIP.AUTO-MCNC: 114 MG/DL (ref 65–100)
GLUCOSE BLD STRIP.AUTO-MCNC: 125 MG/DL (ref 65–100)
GLUCOSE BLD STRIP.AUTO-MCNC: 156 MG/DL (ref 65–100)
GLUCOSE SERPL-MCNC: 124 MG/DL (ref 65–100)
HCT VFR BLD AUTO: 29.1 % (ref 35–47)
HGB BLD-MCNC: 9.8 G/DL (ref 11.5–16)
IMM GRANULOCYTES # BLD: 0 K/UL (ref 0–0.04)
IMM GRANULOCYTES NFR BLD AUTO: 1 % (ref 0–0.5)
LYMPHOCYTES # BLD: 1 K/UL (ref 0.8–3.5)
LYMPHOCYTES NFR BLD: 17 % (ref 12–49)
MAGNESIUM SERPL-MCNC: 1.8 MG/DL (ref 1.6–2.4)
MCH RBC QN AUTO: 34.5 PG (ref 26–34)
MCHC RBC AUTO-ENTMCNC: 33.7 G/DL (ref 30–36.5)
MCV RBC AUTO: 102.5 FL (ref 80–99)
MONOCYTES # BLD: 0.4 K/UL (ref 0–1)
MONOCYTES NFR BLD: 7 % (ref 5–13)
NEUTS SEG # BLD: 4.7 K/UL (ref 1.8–8)
NEUTS SEG NFR BLD: 75 % (ref 32–75)
NRBC # BLD: 0 K/UL (ref 0–0.01)
NRBC BLD-RTO: 0 PER 100 WBC
PLATELET # BLD AUTO: 168 K/UL (ref 150–400)
PMV BLD AUTO: 9.9 FL (ref 8.9–12.9)
POTASSIUM SERPL-SCNC: 3.6 MMOL/L (ref 3.5–5.1)
RBC # BLD AUTO: 2.84 M/UL (ref 3.8–5.2)
SERVICE CMNT-IMP: ABNORMAL
SODIUM SERPL-SCNC: 138 MMOL/L (ref 136–145)
WBC # BLD AUTO: 6.3 K/UL (ref 3.6–11)

## 2018-07-19 PROCEDURE — 85025 COMPLETE CBC W/AUTO DIFF WBC: CPT | Performed by: NURSE PRACTITIONER

## 2018-07-19 PROCEDURE — 36415 COLL VENOUS BLD VENIPUNCTURE: CPT | Performed by: NURSE PRACTITIONER

## 2018-07-19 PROCEDURE — 65270000032 HC RM SEMIPRIVATE

## 2018-07-19 PROCEDURE — 94760 N-INVAS EAR/PLS OXIMETRY 1: CPT

## 2018-07-19 PROCEDURE — 77010033678 HC OXYGEN DAILY

## 2018-07-19 PROCEDURE — 74011250637 HC RX REV CODE- 250/637: Performed by: PHYSICIAN ASSISTANT

## 2018-07-19 PROCEDURE — 74011250637 HC RX REV CODE- 250/637: Performed by: ORTHOPAEDIC SURGERY

## 2018-07-19 PROCEDURE — 80048 BASIC METABOLIC PNL TOTAL CA: CPT | Performed by: NURSE PRACTITIONER

## 2018-07-19 PROCEDURE — 83735 ASSAY OF MAGNESIUM: CPT | Performed by: NURSE PRACTITIONER

## 2018-07-19 PROCEDURE — 74011250637 HC RX REV CODE- 250/637: Performed by: NURSE PRACTITIONER

## 2018-07-19 PROCEDURE — 82962 GLUCOSE BLOOD TEST: CPT

## 2018-07-19 RX ADMIN — METFORMIN HYDROCHLORIDE 500 MG: 500 TABLET, EXTENDED RELEASE ORAL at 17:12

## 2018-07-19 RX ADMIN — SENNOSIDES AND DOCUSATE SODIUM 1 TABLET: 8.6; 5 TABLET ORAL at 17:12

## 2018-07-19 RX ADMIN — ACETAMINOPHEN 650 MG: 325 TABLET, FILM COATED ORAL at 12:03

## 2018-07-19 RX ADMIN — SENNOSIDES AND DOCUSATE SODIUM 1 TABLET: 8.6; 5 TABLET ORAL at 09:08

## 2018-07-19 RX ADMIN — MULTIPLE VITAMINS W/ MINERALS TAB 1 TABLET: TAB at 09:08

## 2018-07-19 RX ADMIN — SERTRALINE HYDROCHLORIDE 100 MG: 50 TABLET ORAL at 09:08

## 2018-07-19 RX ADMIN — POLYETHYLENE GLYCOL 3350 17 G: 17 POWDER, FOR SOLUTION ORAL at 09:08

## 2018-07-19 RX ADMIN — Medication 10 ML: at 05:19

## 2018-07-19 RX ADMIN — ACETAMINOPHEN 650 MG: 325 TABLET, FILM COATED ORAL at 17:12

## 2018-07-19 RX ADMIN — OXAZEPAM 15 MG: 15 CAPSULE, GELATIN COATED ORAL at 21:49

## 2018-07-19 RX ADMIN — OXYCODONE HYDROCHLORIDE 10 MG: 5 TABLET ORAL at 11:59

## 2018-07-19 RX ADMIN — BISACODYL 10 MG: 10 SUPPOSITORY RECTAL at 19:52

## 2018-07-19 RX ADMIN — PRAVASTATIN SODIUM 80 MG: 40 TABLET ORAL at 21:49

## 2018-07-19 RX ADMIN — CYCLOBENZAPRINE HYDROCHLORIDE 10 MG: 10 TABLET, FILM COATED ORAL at 05:18

## 2018-07-19 RX ADMIN — ACETAMINOPHEN 650 MG: 325 TABLET, FILM COATED ORAL at 05:18

## 2018-07-19 RX ADMIN — BUPROPION HYDROCHLORIDE 150 MG: 150 TABLET, EXTENDED RELEASE ORAL at 09:07

## 2018-07-19 RX ADMIN — DIAZEPAM 5 MG: 5 TABLET ORAL at 19:31

## 2018-07-19 RX ADMIN — OXYCODONE HYDROCHLORIDE 10 MG: 5 TABLET ORAL at 05:18

## 2018-07-19 RX ADMIN — OXYCODONE HYDROCHLORIDE 10 MG: 5 TABLET ORAL at 17:12

## 2018-07-19 RX ADMIN — Medication 10 ML: at 21:50

## 2018-07-19 RX ADMIN — CARVEDILOL 3.12 MG: 3.12 TABLET, FILM COATED ORAL at 09:08

## 2018-07-19 NOTE — PROGRESS NOTES
Orthopedic Spine Progress Note  Post Op day: 3 Days Post-Op    2018 9:27 AM   Admit Date: 2018  Procedure: Procedure(s):  L1-2 LAMINECTOMY WITH RODNEY OSTEOTOMY WITH EXTENSION OF FUSION TO T10 (OXYGEN), DURA REPAIR    Subjective:     Anaya Ends laying supine this am. She complaints of discomfort in positioning on complete bedrest laying supine. We discussed again today the importance of laying flat for an additional 24 hours to allows drainage to decrease. She has not experienced any headaches since she has been compliant with laying flat. Tolerating diet. No N/V. Pain Control:   Pain Assessment  Pain Scale 1: Numeric (0 - 10)  Pain Intensity 1: 3  Pain Onset 1: post op  Pain Location 1: Back  Pain Orientation 1: Lower  Pain Description 1: Aching  Pain Intervention(s) 1: Medication (see MAR) (using roxicodone)    Objective:          Physical Exam:  General:  Alert and oriented. No acute distress. Heart:  Respirations unlabored. Abdomen:   Extremities: Soft, non-tender. No evidence of cyanosis. Pulses palpable in both upper and lower extremities. Neurologic:  Musculoskeletal:  No new motor deficits. Neurovascular exam within normal limits. Sensation stable. Motor: unchanged C5-T1 and L2-S1. Ez's sign negative in bilateral lower extremities. Calves soft, nontender upon palpation and with passive twitch. Moves both upper and lower extremities. Incision: Wound edges well approximated. Zipline in tact. Active serosanguinous drainage from medial portion of incision site. No foul odor, no purulence. ABD dressing 2/3 saturated. Vital Signs:    Blood pressure 126/67, pulse 84, temperature 98.6 °F (37 °C), resp. rate 18, height 5' 4\" (1.626 m), weight 86.6 kg (191 lb), SpO2 96 %.   Temp (24hrs), Av °F (37.2 °C), Min:98.5 °F (36.9 °C), Max:99.4 °F (37.4 °C)      LAB:    Recent Labs      18   0310   HGB  10.1*     Lab Results   Component Value Date/Time    Sodium 141 07/17/2018 04:50 AM    Potassium 3.7 07/17/2018 04:50 AM    Chloride 107 07/17/2018 04:50 AM    CO2 24 07/17/2018 04:50 AM    Glucose 154 (H) 07/17/2018 04:50 AM    BUN 19 07/17/2018 04:50 AM    Creatinine 0.76 07/17/2018 04:50 AM    Calcium 7.8 (L) 07/17/2018 04:50 AM       Intake/Output:   07/17 1901 - 07/19 0700  In: -   Out: 6377 [Urine:2230]    PT/OT:   Gait:                    Assessment:   Patient is 3 Days Post-Op s/p Procedure(s):  L1-2 LAMINECTOMY WITH RODNEY OSTEOTOMY WITH EXTENSION OF FUSION TO T10 (OXYGEN), DURA REPAIR    Plan:     1. Hold PT/OT until tomorrow  2. Continue established methods of pain control  3. VTE Prophylaxes - TEDS &/or SCDs   4. Encouraged ICS  5. Remain on complete bedrest, supine for 24h  6. Continue to change dressing BID. Keep dry.    7.  Discharge pending      Signed By: Jose Severin, PA

## 2018-07-19 NOTE — PROGRESS NOTES
Orthopedic Spine Progress Note  Miguel Ángel Whitney, AGACNP-BC  Work Cell: [phone]    Post Op Day: 3 Days Post-Op    July 19, 2018 12:43 PM     Stephanie Mandel    HPI:      Stephanie Mandel is a 72 y.o. female with PMH significant for PMR, rosacea, IBS, hypertension, CKD, OAB, DMII, and hyperlipidemia. She underwent a L2-S1 lumbar fusion in 2008 for lumbar stenosis and scoliosis. She is now under the care of Dr. Loren Arechiga for progressive back and bilateral leg pain. No antecedent trauma. Symptoms have become debilitating and refractory to nonoperative care. Her MRI revealed a T12-L1 disc herniation with junctional stenosis at L1-2. After a discussion of the risks, benefits, and alternatives, Stephanie Mandel consented to undergo a  Procedure(s):  L1-2 LAMINECTOMY WITH RODNEY OSTEOTOMY WITH EXTENSION OF FUSION TO T10 (OXYGEN), DURA REPAIR    Subjective      Patient flat in bed x 3 days for incidental durotomy and persistent serosanguinous wound drainage. She has a hard time getting comfortable. Was refusing stool softeners d/t hx of IBS but encouraged to start taking this medication to counteract her immobility as well as the strong opioids she is taking for postoperative pain. She denies any headaches. She denies any lower extremity paresthesias. No gas yet but abdomen is benign appearing. Her hgb is stable despite increased incisional drainage             Objective:     Physical Exam:  General:  Alert and oriented. No acute distress. Respiratory:  Breathing unlabored. Abdomen:   Extremities: Soft, non-tender, non-distended. Bowel sounds active  No evidence of cyanosis. Pulses palpable in both upper and lower extremities. Neurologic:    Musculoskeletal:  Speech clear. BARR, FC. No new motor deficits. Neurovascular exam within normal limits. Sensation stable. Motor: unchanged L2-S1. Calves soft, nontender upon palpation and with passive stretch. Moves both upper and lower extremities.    Incision: clean, dry, and intact. No significant erythema or swelling. No active drainage noted. Vital Signs:    Patient Vitals for the past 8 hrs:   BP Temp Pulse Resp SpO2   18 0902 126/67 98.6 °F (37 °C) 84 18 96 %     Temp (24hrs), Av °F (37.2 °C), Min:98.5 °F (36.9 °C), Max:99.4 °F (37.4 °C)      Intake/Output:  701 - 1900  In: -   Out: 325 [Urine:325]  1901 - 700  In: -   Out: 5699 [Urine:2230]    Pain Control:   Pain Assessment  Pain Scale 1: Numeric (0 - 10)  Pain Intensity 1: 3  Pain Onset 1: post op  Pain Location 1: Back  Pain Orientation 1: Lower  Pain Description 1: Aching  Pain Intervention(s) 1: Medication (see MAR) (using roxicodone)    LAB:    Recent Labs      18   0310   HGB  10.1*     Lab Results   Component Value Date/Time    Sodium 141 2018 04:50 AM    Potassium 3.7 2018 04:50 AM    Chloride 107 2018 04:50 AM    CO2 24 2018 04:50 AM    Glucose 154 (H) 2018 04:50 AM    BUN 19 2018 04:50 AM    Creatinine 0.76 2018 04:50 AM    Calcium 7.8 (L) 2018 04:50 AM       PT/OT:   Gait:                      Assessment/Plan      1. 3 Days Post-Op Procedure(s):  L1-2 LAMINECTOMY WITH RODNEY OSTEOTOMY WITH EXTENSION OF FUSION TO T10 (OXYGEN), DURA REPAIR   -PT/OT tomorrow am. Brace when OOB   -Pain control- PRN oxycodone, APAP, ice to back   -PRN lumbar dressing changes   -Mejia in place while on strict bedrest   -Encourage Incentive Spirometer   -[employer]. Stool softener + laxative   -VTE Prophylaxes - TEDS &SCDs    2. DMII   -hgba1c 7.7%   -fbg between 112 and 130 in last 24 hours   -accuchecks, humalog SSI, diabetic diet   -c/w metformin from home     3. CKD, stage II     -Cr stable postoperatively    4. Previous Hx of ITP   -no purpura or other obvious signs of bleeding besides surgical site   -check platelets    5. Acute postoperative blood loss anemia   -hgb 10.1 (11.1 pod#1).  Anticipated perioperative blood loss anemia   -recheck cbc today. Patient is stable and tolerating anemia well    7. HLD    -currently on max dose Pravachol.    -Cholesterol, TG, and LDL not at goal on last lipid panel   -f/u with PCP for optimization     8.  Depression   -continue bupropion and sertraline    Plan above discussed with Dr. Pimentel Cuff    Discharge To:  Pending PT/OT evals for dispo planning  Signed By: Romayne Kurtz, NP

## 2018-07-19 NOTE — PROGRESS NOTES
Provided pastoral care visit to [facility] patient. Did not include sacramental care.

## 2018-07-19 NOTE — PROGRESS NOTES
Bedside and Verbal shift change report given to Elis Pinedo RN (oncoming nurse) by Ramsey Shaikh RN (offgoing nurse). Report included the following information SBAR, Kardex, OR Summary, Intake/Output, MAR and Recent Results.

## 2018-07-19 NOTE — PROGRESS NOTES
Patient discussed during rounds, informed that she is to lay flat again today. Hopefully she will be able to start working with therapy tomorrow. Cm sent referral to [address] [place] [address] even though they cannot evaluate yet. Patient has commercial insurance and will need authorization if she goes to rehab.   [employer] , [place]

## 2018-07-20 LAB
GLUCOSE BLD STRIP.AUTO-MCNC: 136 MG/DL (ref 65–100)
GLUCOSE BLD STRIP.AUTO-MCNC: 137 MG/DL (ref 65–100)
GLUCOSE BLD STRIP.AUTO-MCNC: 139 MG/DL (ref 65–100)
GLUCOSE BLD STRIP.AUTO-MCNC: 166 MG/DL (ref 65–100)
SERVICE CMNT-IMP: ABNORMAL

## 2018-07-20 PROCEDURE — G8978 MOBILITY CURRENT STATUS: HCPCS

## 2018-07-20 PROCEDURE — 94760 N-INVAS EAR/PLS OXIMETRY 1: CPT

## 2018-07-20 PROCEDURE — 65270000032 HC RM SEMIPRIVATE

## 2018-07-20 PROCEDURE — 97161 PT EVAL LOW COMPLEX 20 MIN: CPT

## 2018-07-20 PROCEDURE — 74011636637 HC RX REV CODE- 636/637: Performed by: PHYSICIAN ASSISTANT

## 2018-07-20 PROCEDURE — 74011250637 HC RX REV CODE- 250/637: Performed by: ORTHOPAEDIC SURGERY

## 2018-07-20 PROCEDURE — 51798 US URINE CAPACITY MEASURE: CPT

## 2018-07-20 PROCEDURE — 74011250637 HC RX REV CODE- 250/637: Performed by: PHYSICIAN ASSISTANT

## 2018-07-20 PROCEDURE — G8979 MOBILITY GOAL STATUS: HCPCS

## 2018-07-20 PROCEDURE — 74011250637 HC RX REV CODE- 250/637: Performed by: NURSE PRACTITIONER

## 2018-07-20 PROCEDURE — 82962 GLUCOSE BLOOD TEST: CPT

## 2018-07-20 RX ORDER — OXYCODONE HYDROCHLORIDE 5 MG/1
5-10 TABLET ORAL
Qty: 80 TAB | Refills: 0 | Status: SHIPPED | OUTPATIENT
Start: 2018-07-20 | End: 2018-08-13

## 2018-07-20 RX ADMIN — OXYCODONE HYDROCHLORIDE 5 MG: 5 TABLET ORAL at 09:51

## 2018-07-20 RX ADMIN — DIAZEPAM 5 MG: 5 TABLET ORAL at 17:53

## 2018-07-20 RX ADMIN — Medication 10 ML: at 05:45

## 2018-07-20 RX ADMIN — OXAZEPAM 15 MG: 15 CAPSULE, GELATIN COATED ORAL at 23:01

## 2018-07-20 RX ADMIN — METFORMIN HYDROCHLORIDE 500 MG: 500 TABLET, EXTENDED RELEASE ORAL at 17:41

## 2018-07-20 RX ADMIN — CLINDAMYCIN PHOSPHATE: 10 GEL TOPICAL at 09:52

## 2018-07-20 RX ADMIN — DIAZEPAM 5 MG: 5 TABLET ORAL at 01:02

## 2018-07-20 RX ADMIN — INSULIN LISPRO 2 UNITS: 100 INJECTION, SOLUTION INTRAVENOUS; SUBCUTANEOUS at 17:42

## 2018-07-20 RX ADMIN — CLINDAMYCIN PHOSPHATE: 10 GEL TOPICAL at 17:56

## 2018-07-20 RX ADMIN — OXYCODONE HYDROCHLORIDE 10 MG: 5 TABLET ORAL at 18:57

## 2018-07-20 RX ADMIN — SENNOSIDES AND DOCUSATE SODIUM 1 TABLET: 8.6; 5 TABLET ORAL at 09:50

## 2018-07-20 RX ADMIN — CARVEDILOL 3.12 MG: 3.12 TABLET, FILM COATED ORAL at 09:51

## 2018-07-20 RX ADMIN — ACETAMINOPHEN 650 MG: 325 TABLET, FILM COATED ORAL at 12:49

## 2018-07-20 RX ADMIN — ACETAMINOPHEN 650 MG: 325 TABLET, FILM COATED ORAL at 00:08

## 2018-07-20 RX ADMIN — OXYCODONE HYDROCHLORIDE 10 MG: 5 TABLET ORAL at 12:49

## 2018-07-20 RX ADMIN — OXYCODONE HYDROCHLORIDE 5 MG: 5 TABLET ORAL at 04:11

## 2018-07-20 RX ADMIN — SERTRALINE HYDROCHLORIDE 100 MG: 50 TABLET ORAL at 09:50

## 2018-07-20 RX ADMIN — Medication 10 ML: at 05:46

## 2018-07-20 RX ADMIN — ACETAMINOPHEN 650 MG: 325 TABLET, FILM COATED ORAL at 17:41

## 2018-07-20 RX ADMIN — ACETAMINOPHEN 650 MG: 325 TABLET, FILM COATED ORAL at 05:45

## 2018-07-20 RX ADMIN — Medication 10 ML: at 14:00

## 2018-07-20 RX ADMIN — PRAVASTATIN SODIUM 80 MG: 40 TABLET ORAL at 22:11

## 2018-07-20 RX ADMIN — POLYETHYLENE GLYCOL 3350 17 G: 17 POWDER, FOR SOLUTION ORAL at 09:51

## 2018-07-20 RX ADMIN — BUPROPION HYDROCHLORIDE 150 MG: 150 TABLET, EXTENDED RELEASE ORAL at 09:50

## 2018-07-20 RX ADMIN — SENNOSIDES AND DOCUSATE SODIUM 1 TABLET: 8.6; 5 TABLET ORAL at 17:41

## 2018-07-20 RX ADMIN — DIAZEPAM 5 MG: 5 TABLET ORAL at 07:57

## 2018-07-20 RX ADMIN — MULTIPLE VITAMINS W/ MINERALS TAB 1 TABLET: TAB at 09:50

## 2018-07-20 RX ADMIN — OXYCODONE HYDROCHLORIDE 10 MG: 5 TABLET ORAL at 22:11

## 2018-07-20 RX ADMIN — ACETAMINOPHEN 650 MG: 325 TABLET, FILM COATED ORAL at 23:01

## 2018-07-20 NOTE — PROGRESS NOTES
Bedside and Verbal shift change report given to Julianne (oncoming nurse) by Dori Singh RN (offgoing nurse). Report included the following information SBAR, Kardex, OR Summary, Intake/Output, MAR and Recent Results.

## 2018-07-20 NOTE — PROGRESS NOTES
Bedside shift change report given to Vance (oncoming nurse) by Moses Salcido (offgoing nurse). Report included the following information SBAR, Kardex, Intake/Output and Recent Results.

## 2018-07-20 NOTE — PROGRESS NOTES
Cm spoke with [facility] liaison regarding this referral. Once patient is able to work with therapy this afternoon, [facility] can review how she did/see if she meets criteria for inpatient rehab. Patient has commercial insurance which requires authorization. Patient will most likely remain in the hospital through Monday.   [employer] Dailey

## 2018-07-20 NOTE — PROGRESS NOTES
Orthopedic Spine Progress Note  Post Op day: 4 Days Post-Op    2018 8:03 AM   Admit Date: 2018  Procedure: Procedure(s):  L1-2 LAMINECTOMY WITH RODNEY OSTEOTOMY WITH EXTENSION OF FUSION TO T10 (OXYGEN), DURA REPAIR    Subjective:     Anaya Granger appears well. Continues to be on complete bedrest. Drainage from the wound has improved. No headaches. Tolerating diet  No N/V  Mejia in place    Pain Control:   Pain Assessment  Pain Scale 1: Numeric (0 - 10)  Pain Intensity 1: 4  Pain Onset 1: S/P post op surgical pain. Pain Location 1: Back, Incisional  Pain Orientation 1: Lower  Pain Description 1: Aching  Pain Intervention(s) 1: Medication (see MAR)    Objective:          Physical Exam:  General:  Alert and oriented. No acute distress. Heart:  Respirations unlabored. Abdomen:   Extremities: Soft, non-tender. No evidence of cyanosis. Pulses palpable in both upper and lower extremities. Neurologic:  Musculoskeletal:  No new motor deficits. Neurovascular exam within normal limits. Sensation stable. Motor: unchanged C5-T1 and L2-S1. Ez's sign negative in bilateral lower extremities. Calves soft, nontender upon palpation and with passive twitch. Moves both upper and lower extremities. Incision: clean, dry, and intact. No significant erythema or swelling. No active drainage noted. Vital Signs:    Blood pressure 131/69, pulse 83, temperature 99.7 °F (37.6 °C), resp. rate 18, height 5' 4\" (1.626 m), weight 86.6 kg (191 lb), SpO2 96 %.   Temp (24hrs), Av.8 °F (37.1 °C), Min:98.3 °F (36.8 °C), Max:99.7 °F (37.6 °C)      LAB:    Recent Labs      18   1535   HCT  29.1*   HGB  9.8*   PLT  168     Lab Results   Component Value Date/Time    Sodium 138 2018 03:35 PM    Potassium 3.6 2018 03:35 PM    Chloride 105 2018 03:35 PM    CO2 27 2018 03:35 PM    Glucose 124 (H) 2018 03:35 PM    BUN 7 2018 03:35 PM    Creatinine 0.57 2018 03:35 PM Calcium 8.2 (L) 07/19/2018 03:35 PM       Intake/Output:07/20 0701 - 07/20 1900  In: -   Out: 200 [Urine:200]  07/18 1901 - 07/20 0700  In: 120 [P.O.:120]  Out: 4600 [Urine:4600]      Assessment:   Patient is 4 Days Post-Op s/p Procedure(s):  L1-2 LAMINECTOMY WITH RODNEY OSTEOTOMY WITH EXTENSION OF FUSION TO T10 (OXYGEN), DURA REPAIR    Plan:     1. Bedrest. Slowly elevate the HOB by 15 degrees every hour beginning after noon. If c/o frontal headache, down flat  again for remainder of the day   2. Continue established methods of pain control  3. VTE Prophylaxes - TEDS & SCDs   4. Encouraged use of ICS  5. Mejia to be removed once mobilizing  6. Replace Zipline and add nylon sutures to drainage site  7.   Discharge pending    Signed By: Farida Beck PA-C

## 2018-07-20 NOTE — PROGRESS NOTES
Problem: Mobility Impaired (Adult and Pediatric)  Goal: *Acute Goals and Plan of Care (Insert Text)  Physical Therapy Goals  Initiated 7/20/2018    1. Patient will move from supine to sit and sit to supine , scoot up and down and roll side to side in bed with modified independence within 4 days. 2. Patient will perform sit to stand with modified independence within 4 days. 3. Patient will ambulate with modified independence for 150 feet with the least restrictive device within 4 days. 4. Patient will verbalize and demonstrate understanding of spinal precautions (No bending, lifting greater than 5 lbs, or twisting; log-roll technique; frequent repositioning as instructed) within 4 days. physical Therapy EVALUATION  Patient: Alba Banks (17 y.o. female)  Date: 7/20/2018  Primary Diagnosis: Bilateral stenosis of lateral recess of lumbar spine [M48.061]  Procedure(s) (LRB):  L1-2 LAMINECTOMY WITH RODNEY OSTEOTOMY WITH EXTENSION OF FUSION TO T10 (OXYGEN), DURA REPAIR (N/A) 4 Days Post-Op   Precautions:   Fall (brace OOB, back)    ASSESSMENT :  Based on the objective data described below, the patient presents with generalized weakness, impaired standing balance, and overall decreased independence from baseline following admission for L1-2 laminectomy with extension of fusion to T10. Patient has been on bedrest x 4 days secondary to dural tear. She's overall CGA for bed mobility using log roll technique and CGA for transfers. She required min A for donning quick draw brace. Patient ambulated 40 feet with B HHA and min A x 2 for balance. Patient without c/o dizziness or lightheadedness with position changes. Remained OOB in chair at end of session. Able to teach back 1/3 back precautions at end of session. Recommend IP rehab vs HHPT pending progress. Patient will benefit from skilled intervention to address the above impairments.   Patients rehabilitation potential is considered to be Good  Factors which may influence rehabilitation potential include:   []         None noted  []         Mental ability/status  [x]         Medical condition  []         Home/family situation and support systems  []         Safety awareness  []         Pain tolerance/management  []         Other:      PLAN :  Recommendations and Planned Interventions:  [x]           Bed Mobility Training             [x]    Neuromuscular Re-Education  [x]           Transfer Training                   []    Orthotic/Prosthetic Training  [x]           Gait Training                         []    Modalities  [x]           Therapeutic Exercises           []    Edema Management/Control  [x]           Therapeutic Activities            [x]    Patient and Family Training/Education  []           Other (comment):    Frequency/Duration: Patient will be followed by physical therapy  twice daily to address goals. Discharge Recommendations: Home Health and Inpatient Rehab  Further Equipment Recommendations for Discharge: Owns  and [facility]     SUBJECTIVE:   Patient stated It feels so good to be up.     OBJECTIVE DATA SUMMARY:   HISTORY:    Past Medical History:   Diagnosis Date    Abnormal LFTs 08/24/2017    FATTY LIVER    Arthritis     OSTEO    Avascular necrosis of hip ([facility] 08/24/2017    BILAT HIPS    Breast CA ([facility]     BILATERAL; SURGERY, CHEMO    Chronic pain     CKD (chronic kidney disease), stage II 8/24/2017    Coagulation disorder ([facility] 1984    ITP  (DR CHU BRADSHAW)    Depression     Diabetes ([facility]     TYPE 2; NIDDM    DJD (degenerative joint disease), multiple sites 8/24/2017    GI bleed 2008    HEMORRHOIDS    Hyperlipemia     Hypertension with renal disease 8/24/2017    IBS (irritable bowel syndrome) 8/24/2017    Myalgia 8/24/2017    On statin therapy 8/24/2017    Overactive bladder 8/24/2017    Pneumonia 04/2015    HOSPITALIZED 3 WEEKS.     Polymyalgia rheumatica ([facility] 8/24/2017    Prophylactic antibiotic 8/24/2017    Rosacea      Past Surgical History:   Procedure Laterality Date    BREAST SURGERY PROCEDURE UNLISTED      RECONSTRUCTION X2    HX APPENDECTOMY  1950    HX CATARACT REMOVAL Bilateral     W /IOL    HX GI      COLONOSCOPY    HX HEENT      WISDOM TEETH    HX HYSTERECTOMY  2000S    HX MASTECTOMY  1989    bilateral    HX MASTECTOMY  2001    HX ORTHOPAEDIC      back fusion 2008-LUMBAR    HX TUBAL LIGATION  1981    TOTAL HIP ARTHROPLASTY Left 11/16/2016    ANTERIOR APPROACH, DR Gwendolyn De La Torre; (POSTOP: STANDS ONE INCH TALLER ON LEFT FOOT)    TOTAL HIP ARTHROPLASTY Right 12/2016    ANTERIOR APPROACH (DR Gwendolyn De La Torre)     Prior Level of Function/Home Situation: mod I with RW, lives alone  Personal factors and/or comorbidities impacting plan of care:     Home Situation  Home Environment: Private residence  # Steps to Enter:  (ramp)  Wheelchair Ramp: Yes  One/Two Story Residence: Two story, live on 1st floor  # of Interior Steps: 13  Living Alone: Yes  Support Systems: Child(silas)  Patient Expects to be Discharged to[de-identified] Rehabilitation facility  Current DME Used/Available at Home[place], straight, Walker, rolling    EXAMINATION/PRESENTATION/DECISION MAKING:   Critical Behavior:  Neurologic State: Alert  Orientation Level: Oriented X4  Cognition: Appropriate for age attention/concentration, Follows commands     Hearing:   Auditory  Auditory Impairment: None  Skin:    Edema:   Range Of Motion:  AROM: Generally decreased, functional           PROM: Generally decreased, functional           Strength:    Strength: Generally decreased, functional                    Tone & Sensation:                                  Coordination:     Vision:      Functional Mobility:  Bed Mobility:  Rolling: Supervision  Supine to Sit: Contact guard assistance        Transfers:  Sit to Stand: Contact guard assistance  Stand to Sit: Contact guard assistance  Stand Pivot Transfers: Minimum assistance                    Balance:   Sitting: Intact  Standing: Impaired  Standing - Static: Good;Constant support  Standing - Dynamic : Fair  Ambulation/Gait Training:  Distance (ft): 40 Feet (ft)  Assistive Device: Gait belt;Brace/Splint (B HHA)  Ambulation - Level of Assistance: Minimal assistance;Assist x2        Gait Abnormalities: Decreased step clearance; Antalgic        Base of Support: Narrowed     Speed/Vivian: Pace decreased (<100 feet/min)  Step Length: Right shortened;Left shortened                     Stairs: Therapeutic Exercises:       Functional Measure:  Timed up and go:    Timed Get Up And Go Test: 20     Timed Up and Go and G-code impairment scale:  Percentage of Impairment CH    0%   CI    1-19% CJ    20-39% CK    40-59% CL    60-79% CM    80-99% CN     100%   Timed   Score 0-56 10 11-12 13-14 15-16 17-18 19 20       < than 10 seconds=Normal  Greater then 13.5 seconds (in elderly)=Increased fall risk   Soumya Castellanos [place] M. Predicting the probability for falls in community dwelling older adults using the Timed Up and Go Test. Phys Ther. 2000;80:896-903. G codes: In compliance with CMSs Claims Based Outcome Reporting, the following G-code set was chosen for this patient based on their primary functional limitation being treated: The outcome measure chosen to determine the severity of the functional limitation was the TUG with a score of 20 seconds which was correlated with the impairment scale.     ? Mobility - Walking and Moving Around:     - CURRENT STATUS: CN - 100% impaired, limited or restricted    - GOAL STATUS: CM - 80%-99% impaired, limited or restricted    - D/C STATUS:  ---------------To be determined---------------          Pain:  Pain Scale 1: Numeric (0 - 10)  Pain Intensity 1: 8  Pain Location 1: Incisional  Pain Orientation 1: Mid  Pain Description 1: Aching;Heaviness  Pain Intervention(s) 1: Medication (see MAR)  Activity Tolerance:   Improving  Please refer to the flowsheet for vital signs taken during this treatment. After treatment:   [x]         Patient left in no apparent distress sitting up in chair  []         Patient left in no apparent distress in bed  [x]         Call bell left within reach  [x]         Nursing notified  []         Caregiver present  []         Bed alarm activated    COMMUNICATION/EDUCATION:   The patients plan of care was discussed with: Registered Nurse. [x]         Fall prevention education was provided and the patient/caregiver indicated understanding. [x]         Patient/family have participated as able in goal setting and plan of care. [x]         Patient/family agree to work toward stated goals and plan of care. []         Patient understands intent and goals of therapy, but is neutral about his/her participation. []         Patient is unable to participate in goal setting and plan of care.     Thank you for this referral.  Edwin Smalls, PT   Time Calculation: 12 mins

## 2018-07-20 NOTE — PROGRESS NOTES
Orthopedic Spine Progress Note  Post Op day: 4 Days Post-Op    2018 4:43 PM   Admit Date: 2018  Procedure: Procedure(s):  L1-2 LAMINECTOMY WITH RODNEY OSTEOTOMY WITH EXTENSION OF FUSION TO T10 (OXYGEN), DURA REPAIR    Subjective:     Update:  Dale Jennings appears well. Patient ambulated a good distance with physical therapy. No headache. Minimal  drainage from the wound   Tolerating diet  No N/V    Pain Control:   Pain Assessment  Pain Scale 1: Numeric (0 - 10)  Pain Intensity 1: 8  Pain Onset 1: S/P post op surgical pain. Pain Location 1: Incisional  Pain Orientation 1: Mid  Pain Description 1: Aching, Heaviness  Pain Intervention(s) 1: Medication (see MAR)    Objective:          Physical Exam:  General:  Alert and oriented. No acute distress. Heart:  Respirations unlabored. Abdomen:   Extremities: Soft, non-tender. No evidence of cyanosis. Pulses palpable in both upper and lower extremities. Neurologic:  Musculoskeletal:  No new motor deficits. Neurovascular exam within normal limits. Sensation stable. Motor: unchanged C5-T1 and L2-S1. Ez's sign negative in bilateral lower extremities. Calves soft, nontender upon palpation and with passive twitch. Moves both upper and lower extremities. Incision: clean, dry, and intact. No significant erythema or swelling. No active drainage noted. Vital Signs:    Blood pressure 147/87, pulse 74, temperature 98.6 °F (37 °C), resp. rate 18, height 5' 4\" (1.626 m), weight 86.6 kg (191 lb), SpO2 95 %.   Temp (24hrs), Av.8 °F (37.1 °C), Min:98.1 °F (36.7 °C), Max:99.7 °F (37.6 °C)      LAB:    Recent Labs      18   1535   HCT  29.1*   HGB  9.8*   PLT  168     Lab Results   Component Value Date/Time    Sodium 138 2018 03:35 PM    Potassium 3.6 2018 03:35 PM    Chloride 105 2018 03:35 PM    CO2 27 2018 03:35 PM    Glucose 124 (H) 2018 03:35 PM    BUN 7 2018 03:35 PM    Creatinine 0.57 2018 03:35 PM    Calcium 8.2 (L) 07/19/2018 03:35 PM       Intake/Output:07/20 0701 - 07/20 1900  In: -   Out: 650 [Urine:650]  07/18 1901 - 07/20 0700  In: 120 [P.O.:120]  Out: 4600 [Urine:4600]    PT/OT:   Gait:  Gait  Base of Support: Narrowed  Speed/Vivian: Pace decreased (<100 feet/min)  Step Length: Right shortened, Left shortened  Gait Abnormalities: Decreased step clearance, Antalgic  Ambulation - Level of Assistance: Minimal assistance, Assist x2  Distance (ft): 40 Feet (ft)  Assistive Device: Gait belt, Brace/Splint (B HHA)                 Assessment:   Patient is 4 Days Post-Op s/p Procedure(s):  L1-2 LAMINECTOMY WITH RODNEY OSTEOTOMY WITH EXTENSION OF FUSION TO T10 (OXYGEN), DURA REPAIR    Plan:     1. Continue PT. No restrictions  2. Remove Mejia  3.  Discharge to rehab vs home health pending    Signed By: Fior Butler PA-C

## 2018-07-21 LAB
GLUCOSE BLD STRIP.AUTO-MCNC: 120 MG/DL (ref 65–100)
GLUCOSE BLD STRIP.AUTO-MCNC: 157 MG/DL (ref 65–100)
GLUCOSE BLD STRIP.AUTO-MCNC: 207 MG/DL (ref 65–100)
GLUCOSE BLD STRIP.AUTO-MCNC: 212 MG/DL (ref 65–100)
SERVICE CMNT-IMP: ABNORMAL

## 2018-07-21 PROCEDURE — 82962 GLUCOSE BLOOD TEST: CPT

## 2018-07-21 PROCEDURE — 94760 N-INVAS EAR/PLS OXIMETRY 1: CPT

## 2018-07-21 PROCEDURE — 97535 SELF CARE MNGMENT TRAINING: CPT

## 2018-07-21 PROCEDURE — 74011250636 HC RX REV CODE- 250/636: Performed by: PHYSICIAN ASSISTANT

## 2018-07-21 PROCEDURE — 65270000032 HC RM SEMIPRIVATE

## 2018-07-21 PROCEDURE — G8987 SELF CARE CURRENT STATUS: HCPCS

## 2018-07-21 PROCEDURE — 97116 GAIT TRAINING THERAPY: CPT

## 2018-07-21 PROCEDURE — 74011250637 HC RX REV CODE- 250/637: Performed by: ORTHOPAEDIC SURGERY

## 2018-07-21 PROCEDURE — 74011636637 HC RX REV CODE- 636/637: Performed by: PHYSICIAN ASSISTANT

## 2018-07-21 PROCEDURE — 97165 OT EVAL LOW COMPLEX 30 MIN: CPT

## 2018-07-21 PROCEDURE — G8988 SELF CARE GOAL STATUS: HCPCS

## 2018-07-21 PROCEDURE — 74011250637 HC RX REV CODE- 250/637: Performed by: PHYSICIAN ASSISTANT

## 2018-07-21 PROCEDURE — 74011250637 HC RX REV CODE- 250/637: Performed by: NURSE PRACTITIONER

## 2018-07-21 RX ORDER — DEXAMETHASONE SODIUM PHOSPHATE 4 MG/ML
6 INJECTION, SOLUTION INTRA-ARTICULAR; INTRALESIONAL; INTRAMUSCULAR; INTRAVENOUS; SOFT TISSUE EVERY 6 HOURS
Status: COMPLETED | OUTPATIENT
Start: 2018-07-21 | End: 2018-07-22

## 2018-07-21 RX ADMIN — CARVEDILOL 3.12 MG: 3.12 TABLET, FILM COATED ORAL at 09:26

## 2018-07-21 RX ADMIN — METFORMIN HYDROCHLORIDE 500 MG: 500 TABLET, EXTENDED RELEASE ORAL at 17:09

## 2018-07-21 RX ADMIN — INSULIN LISPRO 3 UNITS: 100 INJECTION, SOLUTION INTRAVENOUS; SUBCUTANEOUS at 17:00

## 2018-07-21 RX ADMIN — CLINDAMYCIN PHOSPHATE: 10 GEL TOPICAL at 09:26

## 2018-07-21 RX ADMIN — DIAZEPAM 5 MG: 5 TABLET ORAL at 20:20

## 2018-07-21 RX ADMIN — DIAZEPAM 5 MG: 5 TABLET ORAL at 09:26

## 2018-07-21 RX ADMIN — ACETAMINOPHEN 650 MG: 325 TABLET, FILM COATED ORAL at 23:03

## 2018-07-21 RX ADMIN — ACETAMINOPHEN 650 MG: 325 TABLET, FILM COATED ORAL at 12:01

## 2018-07-21 RX ADMIN — SENNOSIDES AND DOCUSATE SODIUM 1 TABLET: 8.6; 5 TABLET ORAL at 17:10

## 2018-07-21 RX ADMIN — INSULIN LISPRO 2 UNITS: 100 INJECTION, SOLUTION INTRAVENOUS; SUBCUTANEOUS at 12:01

## 2018-07-21 RX ADMIN — ACETAMINOPHEN 650 MG: 325 TABLET, FILM COATED ORAL at 17:10

## 2018-07-21 RX ADMIN — OXYCODONE HYDROCHLORIDE 10 MG: 5 TABLET ORAL at 01:20

## 2018-07-21 RX ADMIN — DEXAMETHASONE SODIUM PHOSPHATE 6 MG: 4 INJECTION, SOLUTION INTRAMUSCULAR; INTRAVENOUS at 17:10

## 2018-07-21 RX ADMIN — OXYCODONE HYDROCHLORIDE 10 MG: 5 TABLET ORAL at 07:19

## 2018-07-21 RX ADMIN — MULTIPLE VITAMINS W/ MINERALS TAB 1 TABLET: TAB at 09:26

## 2018-07-21 RX ADMIN — ACETAMINOPHEN 650 MG: 325 TABLET, FILM COATED ORAL at 07:19

## 2018-07-21 RX ADMIN — POLYETHYLENE GLYCOL 3350 17 G: 17 POWDER, FOR SOLUTION ORAL at 09:26

## 2018-07-21 RX ADMIN — DEXAMETHASONE SODIUM PHOSPHATE 6 MG: 4 INJECTION, SOLUTION INTRAMUSCULAR; INTRAVENOUS at 12:01

## 2018-07-21 RX ADMIN — OXYCODONE HYDROCHLORIDE 5 MG: 5 TABLET ORAL at 14:21

## 2018-07-21 RX ADMIN — DEXAMETHASONE SODIUM PHOSPHATE 6 MG: 4 INJECTION, SOLUTION INTRAMUSCULAR; INTRAVENOUS at 23:00

## 2018-07-21 RX ADMIN — SENNOSIDES AND DOCUSATE SODIUM 1 TABLET: 8.6; 5 TABLET ORAL at 09:26

## 2018-07-21 RX ADMIN — OXYCODONE HYDROCHLORIDE 10 MG: 5 TABLET ORAL at 04:35

## 2018-07-21 RX ADMIN — INSULIN LISPRO 2 UNITS: 100 INJECTION, SOLUTION INTRAVENOUS; SUBCUTANEOUS at 21:34

## 2018-07-21 RX ADMIN — Medication 10 ML: at 21:38

## 2018-07-21 RX ADMIN — PRAVASTATIN SODIUM 80 MG: 40 TABLET ORAL at 21:34

## 2018-07-21 RX ADMIN — OXYCODONE HYDROCHLORIDE 5 MG: 5 TABLET ORAL at 10:45

## 2018-07-21 RX ADMIN — OXYCODONE HYDROCHLORIDE 5 MG: 5 TABLET ORAL at 21:34

## 2018-07-21 RX ADMIN — Medication 10 ML: at 14:00

## 2018-07-21 RX ADMIN — OXYCODONE HYDROCHLORIDE 5 MG: 5 TABLET ORAL at 17:13

## 2018-07-21 RX ADMIN — BUPROPION HYDROCHLORIDE 150 MG: 150 TABLET, EXTENDED RELEASE ORAL at 09:26

## 2018-07-21 RX ADMIN — OXAZEPAM 15 MG: 15 CAPSULE, GELATIN COATED ORAL at 23:03

## 2018-07-21 RX ADMIN — SERTRALINE HYDROCHLORIDE 100 MG: 50 TABLET ORAL at 09:26

## 2018-07-21 NOTE — PROGRESS NOTES
Problem: Self Care Deficits Care Plan (Adult)  Goal: *Acute Goals and Plan of Care (Insert Text)  Occupational Therapy Goals  Initiated 7/21/2018  1. Patient will perform lower body dressing with min assistance  using AE PRN within 7 days. 2.  Patient will perform toileting with supervision/set-up using most appropriate DME within 7 days. 3.  Patient will bathing at moderate assistance  within 7 days. 4.  Patient will don/doff back brace supervision within 7 days. 5.  Patient will verbalize/demonstrate 3/3 back precautions during ADL tasks without cues within 7 days. Occupational Therapy EVALUATION  Patient: Alba Banks (89 y.o. female)  Date: 7/21/2018  Primary Diagnosis: Bilateral stenosis of lateral recess of lumbar spine [M48.061]  Procedure(s) (LRB):  L1-2 LAMINECTOMY WITH RODNEY OSTEOTOMY WITH EXTENSION OF FUSION TO T10 (OXYGEN), DURA REPAIR (N/A) 5 Days Post-Op   Precautions:   Back, Fall (Brace)    ASSESSMENT :  Based on the objective data described below, the patient presents with modified independence to max A upper body ADLs, moderate to total A lower body ADLs, bed mobility supervision, functional mobility to and from the bathroom with min A RW management. ADLs limited by tremor L > R, standing tolerance, standing balance, pain management, bed rest 4 days s/p sx, back precautions, activity tolerance in general, R RTC injury, cognition (complex processing, attention to task). Patient can tolerate 3 hours of therapy and is highly motivated to be independent. Currently, she is not safe to discharge home alone. Recommend with nursing patient to complete as able in order to maintain strength, endurance and independence: ADLs with supervision/setup, OOB to chair 3x/day and mobilizing to the bathroom for toileting with 1 assist, RW. Thank you for your assistance. Patient will benefit from skilled intervention to address the above impairments.   Patients rehabilitation potential is considered to be Good  Factors which may influence rehabilitation potential include:   [x]             None noted  []             Mental ability/status  []             Medical condition  []             Home/family situation and support systems  []             Safety awareness  []             Pain tolerance/management  []             Other:      PLAN :  Recommendations and Planned Interventions:  [x]               Self Care Training                  [x]        Therapeutic Activities  [x]               Functional Mobility Training    []        Cognitive Retraining  [x]               Therapeutic Exercises           [x]        Endurance Activities  [x]               Balance Training                   []        Neuromuscular Re-Education  []               Visual/Perceptual Training     [x]   Home Safety Training  [x]               Patient Education                 [x]        Family Training/Education  []               Other (comment):    Frequency/Duration: Patient will be followed by occupational therapy 5 times a week to address goals. Discharge Recommendations: Rehab  Further Equipment Recommendations for Discharge: none noted     SUBJECTIVE:   Patient stated Oh that is good information.     OBJECTIVE DATA SUMMARY:   HISTORY:   Past Medical History:   Diagnosis Date    Abnormal LFTs 08/24/2017    FATTY LIVER    Arthritis     OSTEO    Avascular necrosis of hip ([facility] [address] 08/24/2017    BILAT HIPS    Breast CA ([facility] [address]     BILATERAL; SURGERY, CHEMO    Chronic pain     CKD (chronic kidney disease), stage II 8/24/2017    Coagulation disorder ([facility] [address] 1984    ITP  (DR CHU BRADSHAW)    Depression     Diabetes ([facility] [address]     TYPE 2; NIDDM    DJD (degenerative joint disease), multiple sites 8/24/2017    GI bleed 2008    HEMORRHOIDS    Hyperlipemia     Hypertension with renal disease 8/24/2017    IBS (irritable bowel syndrome) 8/24/2017    Myalgia 8/24/2017    On statin therapy 8/24/2017    Overactive bladder 8/24/2017    Pneumonia 04/2015    HOSPITALIZED 3 WEEKS.  Polymyalgia rheumatica ([facility] [address] 8/24/2017    Prophylactic antibiotic 8/24/2017    Rosacea      Past Surgical History:   Procedure Laterality Date    BREAST SURGERY PROCEDURE UNLISTED      RECONSTRUCTION X2    HX APPENDECTOMY  1950    HX CATARACT REMOVAL Bilateral     W /IOL    HX GI      COLONOSCOPY    HX HEENT      WISDOM TEETH    HX HYSTERECTOMY  2000S    HX MASTECTOMY  1989    bilateral    HX MASTECTOMY  2001    HX ORTHOPAEDIC      back fusion 2008-LUMBAR    HX TUBAL LIGATION  1981    TOTAL HIP ARTHROPLASTY Left 11/16/2016    ANTERIOR APPROACH, DR Gwendolyn De La Torre; (POSTOP: STANDS ONE INCH TALLER ON LEFT FOOT)    TOTAL HIP ARTHROPLASTY Right 12/2016    ANTERIOR APPROACH (DR Gwendolyn De La Torre)       Prior Level of Function/Environment/Context: independent with all ADLs, sitting down in the tub to bath. She was prepping for vacation by increasing her walking distance but could not get past 200 feet without extreme weakness and pain.    Occupations in which the patient is/was successful, what are the barriers preventing that success:   Performance Patterns (routines, roles, habits, and rituals):   Personal Interests and/or values:   Expanded or extensive additional review of patient history: Patient has been to inpatient rehab before; R RTC injury but chose to have back surgery first    [address]: Private residence  # Steps to Enter:  (ramp)  Wheelchair Ramp: Yes  One/Two Story Residence: Two story, live on 1st floor  # of Interior Steps: 13  Living Alone: Yes  Support Systems: Child(silas)  Patient Expects to be Discharged to[de-identified] Rehabilitation facility  Current DME Used/Available at Home: Adaptive dressing aides, Adaptive bathing aides, Cane, straight, Grab bars, Walker, rolling, Raised toilet seat, Shower chair  Tub or Shower Type: Tub/Shower combination    Hand dominance: Right    EXAMINATION OF PERFORMANCE DEFICITS:  Cognitive/Behavioral Status:  Neurologic State: Alert  Orientation Level: Oriented X4  Cognition: Appropriate decision making; Appropriate for age attention/concentration; Appropriate safety awareness  Perception: Appears intact  Perseveration: No perseveration noted  Safety/Judgement: Awareness of environment;Good awareness of safety precautions    Skin: intact bandage back, PIV intact    Edema: intact    Hearing: Auditory  Auditory Impairment: None    Vision/Perceptual:                           Acuity: Impaired near vision    Corrective Lenses: Reading glasses    Range of Motion:  Shoulder flexion 90*, IR to hips only/neutral  AROM: Generally decreased, functional                         Strength:  -3/5 shoulder flexion, elbows-digits   Strength: Generally decreased, functional                Coordination:  Coordination: Generally decreased, functional  Fine Motor Skills-Upper: Left Intact; Right Intact    Gross Motor Skills-Upper: Left Intact; Right Intact    Tone & Sensation:    Tone: Normal  Sensation: Impaired (tingling R LE)                      Balance:  Sitting: Intact; Without support  Standing: Impaired; Without support (one hand on supportive surface)  Standing - Static: Fair  Standing - Dynamic : Fair    Functional Mobility and Transfers for ADLs:  Bed Mobility:  Rolling: Supervision  Supine to Sit: Supervision exit R side. Instruction to have bed flat to prevention spine flexion  Sit to Supine: Supervision  Scooting: Supervision    Transfers:  Sit to Stand: Supervision  Stand to Sit: Supervision  Toilet Transfer : Minimum assistance  Tub Transfer: Total assistance    ADL Assessment:  Feeding: Modified independent    Oral Facial Hygiene/Grooming: Minimum assistance (standing at sink, instruction) cues to not flex spine; standing ~3.5 mins    Bathing: Total assistance fatigue limiting    Upper Body Dressing: Maximum assistance sitting EOB good balance, setup Quick draw brace and A donning posteriorly 2* poor IR B shoulders    Lower Body Dressing:  Total assistance poor tailor sitting, back precautions limiting    Toileting: Moderate assistance fair standing balance, no cues to use RW and grab bar; cues and physical to don pants 2* walking away from commode with pants at ankles          Patient asking to wash hair, bath, and get dressed. ADL Intervention and task modifications:   instruction on benefits of bringing emesis basin to mouth to spit, cup to mouth to rinse to prevent spinal flexion. Instruction on benefits keeping reacher in bathroom to A with clothing management. Patient instructed and indicated understanding the benefits of maintaining activity tolerance, functional mobility, and independence with self care tasks during acute stay  to ensure safe return home and to baseline. Encouraged patient to increase frequency and duration OOB, be out of bed for all meals, perform daily ADLs (as approved by RN/MD regarding bathing etc), and performing functional mobility to/from bathroom. Instruction on pacing. After stood to groom and toileting, patient asking to return to bed. Patient then understanding pacing. Cognitive Retraining  Safety/Judgement: Awareness of environment;Good awareness of safety precautions    Therapeutic Exercise:  Patient instructed on the benefits shoulder flexion exercises to increase independence with ADLs, active ROM, and strength of spine. Patient instructed techniques of activating abdomen and pelvic floor muscles.       Functional Measure:  Barthel Index:    Bathin  Bladder: 10  Bowels: 10  Groomin  Dressin  Feeding: 10  Mobility: 5  Stairs: 0  Toilet Use: 5  Transfer (Bed to Chair and Back): 10  Total: 55       Barthel and G-code impairment scale:  Percentage of impairment CH  0% CI  1-19% CJ  20-39% CK  40-59% CL  60-79% CM  80-99% CN  100%   Barthel Score 0-100 100 99-80 79-60 59-40 20-39 1-19   0   Barthel Score 0-20 20 17-19 13-16 9-12 5-8 1-4 0      The Barthel ADL Index: Guidelines  1. The index should be used as a record of what a patient does, not as a record of what a patient could do. 2. The main aim is to establish degree of independence from any help, physical or verbal, however minor and for whatever reason. 3. The need for supervision renders the patient not independent. 4. A patient's performance should be established using the best available evidence. Asking the patient, friends/relatives and nurses are the usual sources, but direct observation and common sense are also important. However direct testing is not needed. 5. Usually the patient's performance over the preceding 24-48 hours is important, but occasionally longer periods will be relevant. 6. Middle categories imply that the patient supplies over 50 per cent of the effort. 7. Use of aids to be independent is allowed. Rhoda French, Barthel, SHIVAW. (6288). Functional evaluation: the Barthel Index. [facility] (14)2. Greer Stephens eve ADAMA Brice, Shannon Saxena., [facility], [address] [place] [address] (1999). Measuring the change indisability after inpatient rehabilitation; comparison of the responsiveness of the Barthel Index and Functional [place] Measure. Journal of Neurology, Neurosurgery, and Psychiatry, 66(4), 958-066. Sylwia Barajas, N.J.A, CRICKET Castellanos, & Destiney Mejia, M.GWYN. (2004.) Assessment of post-stroke quality of life in cost-effectiveness studies: The usefulness of the Barthel Index and the EuroQoL-5D. Quality of Life Research, 13, 288-85         G codes: In compliance with CMSs Claims Based Outcome Reporting, the following G-code set was chosen for this patient based on their primary functional limitation being treated: The outcome measure chosen to determine the severity of the functional limitation was the Barthel Index with a score of 55/100 which was correlated with the impairment scale. ?  Self Care:     - CURRENT STATUS: CK - 40%-59% impaired, limited or restricted    - GOAL STATUS: CI - 1%-19% impaired, limited or restricted    - D/C STATUS:  ---------------To be determined---------------     Pain:  Pain Scale 1: Numeric (0 - 10)  Pain Intensity 1: 3  Pain Location 1: Leg  Pain Orientation 1: Left  Pain Description 1: Aching  Pain Intervention(s) 1: Medication (see MAR)  Activity Tolerance:   VSS  Please refer to the flowsheet for vital signs taken during this treatment. After treatment:   [] Patient left in no apparent distress sitting up in chair  [x] Patient left in no apparent distress in bed  [x] Call bell left within reach  [x] Nursing notified  [] Caregiver present  [] Bed alarm activated    COMMUNICATION/EDUCATION:   The patients plan of care was discussed with: Physical Therapist and Registered Nurse. [x] Home safety education was provided and the patient/caregiver indicated understanding. [x] Patient/family have participated as able in goal setting and plan of care. [x] Patient/family agree to work toward stated goals and plan of care. [] Patient understands intent and goals of therapy, but is neutral about his/her participation. [] Patient is unable to participate in goal setting and plan of care. This patients plan of care is appropriate for delegation to [facility].     Thank you for this referral.  Dewayne Delgado  Time Calculation: 27 mins

## 2018-07-21 NOTE — ROUTINE PROCESS
Bedside shift change report given to Bridger (oncoming nurse) by Lo Galdamez (offgoing nurse). Report included the following information SBAR, Kardex, OR Summary, Procedure Summary, Intake/Output and MAR.

## 2018-07-21 NOTE — PROGRESS NOTES
Ortho Daily Progress Note    7/21/2018  10:44 AM    POD:  5 Days Post-Op  S/P:  Procedure(s):  L1-2 LAMINECTOMY WITH RODNEY OSTEOTOMY WITH EXTENSION OF FUSION TO T10 (OXYGEN), DURA REPAIR    Afebrile/VSS, C/O increasing left leg pain- Had pre-op pain as well  Had been laying flat for several days due to dural tear  Doing well without complaints of nausea  Pain well controlled- worse when sitting up  Calves soft/NTTP Bilaterally  Incision OK, mild serosanguinous drainage, no dehiscence. Dressing changed  Moving lower extremities well. Motor 5/5  Neurocirculatory exam intact and within normal range.     Lab Results   Component Value Date/Time    HGB 9.8 (L) 07/19/2018 03:35 PM    INR 1.0 07/09/2018 12:56 PM         PLAN: Add decadron 6 mg Q6 for 4 doses  DVT prophylaxis: SCD's  WBAT with PT-mobilization  Pain Control  Plan to D/C possibly tomorrow      [place] MARILYN Mcpherson

## 2018-07-21 NOTE — PROGRESS NOTES
ORTHO POST OP SPINE PROGRESS NOTE    2018  Admit Date: 2018  Admit Diagnosis: Bilateral stenosis of lateral recess of lumbar spine [M48.061]  Procedure: Procedure(s):  L1-2 LAMINECTOMY WITH RODNEY OSTEOTOMY WITH EXTENSION OF FUSION TO T10 (OXYGEN), DURA REPAIR  Post Op day: 5 Days Post-Op    Subjective:     Pauline Meigs  Seen this am.  Ambulating to bathroom. Pain tolerable    Review of Systems: A review of systems was negative. Objective:     PT/OT:   Distance Ambulated:           Time Ambulated (min):        Ambulation Response: Activity Response: Fairly tolerated  Assistive Device:              Assistive Device: Fall prevention device    Vital Signs:    Blood pressure 137/78, pulse 73, temperature 98 °F (36.7 °C), resp. rate 16, height 5' 4\" (1.626 m), weight 86.6 kg (191 lb), SpO2 96 %. Temp (24hrs), Av.4 °F (36.9 °C), Min:97.9 °F (36.6 °C), Max:99.3 °F (37.4 °C)      1901 -  0700  In: -   Out: 300 [Urine:300]   07 -  1900  In: 120 [P.O.:120]  Out: 4950 [Urine:4950]    LAB:    Recent Labs      18   1535   HGB  9.8*   WBC  6.3   PLT  168     Physical Exam:  General:  Alert and oriented. No acute distress. Heart:  Respirations unlabored. Abdomen:   Extremities: Soft, non-tender. No evidence of cyanosis. Pulses palpable in both upper and lower extremities.         Neurologic:  Musculoskeletal:  No new motor deficits. Neurovascular exam within normal limits. Sensation stable. Motor: unchanged C5-T1 and L2-S1. Ez's sign negative in bilateral lower extremities. Calves soft, nontender upon palpation and with passive twitch. Moves both upper and lower extremities. Incision: clean, dry, and intact. No significant erythema or swelling. No active drainage noted.             Assessment:      Patient Active Problem List   Diagnosis Code    Controlled type 2 diabetes mellitus with stage 2 chronic kidney disease, with long-term current use of insulin ([facility] E11.22, N18.2, Z79.4    Non-seasonal allergic rhinitis J30.89    Class 1 obesity due to excess calories without serious comorbidity with body mass index (BMI) of 33.0 to 33.9 in adult E66.09, Z68.33    Rosacea L71.9    Mixed hyperlipidemia E78.2    Anxiety F41.9    GI bleed K92.2    Overactive bladder N32.81    Polymyalgia rheumatica (HCC) M35.3    IBS (irritable bowel syndrome) K58.9    Hypertension with renal disease I12.9    CKD (chronic kidney disease), stage II N18.2    Avascular necrosis of hip ([facility] M87.059    Abnormal LFTs R94.5    Annual physical exam Z00.00    Vitamin D deficiency E55.9    Recurrent depression ([facility]) F33.9    Primary osteoarthritis involving multiple joints M15.0    Medicare annual wellness visit, initial Z00.00    Sleep disturbance G47.9    Pre-operative cardiovascular examination Z01.810    Lumbar disc disease M51.9    Spinal stenosis, lumbar M48.061       Plan:       1. Continue established methods of pain control  2. VTE Prophylaxes - TEDS & SCDs   3.   Encouraged use of ICS           Signed By: Juan Miguel Aguirre DO

## 2018-07-21 NOTE — PROGRESS NOTES
Problem: Mobility Impaired (Adult and Pediatric)  Goal: *Acute Goals and Plan of Care (Insert Text)  Physical Therapy Goals  Initiated 7/20/2018    1. Patient will move from supine to sit and sit to supine , scoot up and down and roll side to side in bed with modified independence within 4 days. 2. Patient will perform sit to stand with modified independence within 4 days. 3. Patient will ambulate with modified independence for 150 feet with the least restrictive device within 4 days. 4. Patient will verbalize and demonstrate understanding of spinal precautions (No bending, lifting greater than 5 lbs, or twisting; log-roll technique; frequent repositioning as instructed) within 4 days. physical Therapy TREATMENT  Patient: Rosa Elena Castro (49 y.o. female)  Date: 7/21/2018  Diagnosis: Bilateral stenosis of lateral recess of lumbar spine [M48.061] <principal problem not specified>  Procedure(s) (LRB):  L1-2 LAMINECTOMY WITH RODNEY OSTEOTOMY WITH EXTENSION OF FUSION TO T10 (OXYGEN), DURA REPAIR (N/A) 5 Days Post-Op  Precautions: Back, Fall (Brace)  Chart, physical therapy assessment, plan of care and goals were reviewed. ASSESSMENT:  Patient received in bed and agreeable to therapy. Moving well with bed mobility although slow and appears in significant pain. Able to don her brace with only min assist maneuvering strap around her back to secure front. Attempted gait without walker but patient insistent on need of walker due to left knee pain. Was able to ambulate with RW ~80 feet in  and back to bed. Declined chair due to uncomfortable and wanting recliner to elevate LE's. Reviewed precautions with patient. Doing well but is limited by pain in LE and requesting pain meds but not due for several hours. Patient states only position of comfort in lying on her right sided.    Progression toward goals:  [x]      Improving appropriately and progressing toward goals  []      Improving slowly and progressing toward goals  []      Not making progress toward goals and plan of care will be adjusted     PLAN:  Patient continues to benefit from skilled intervention to address the above impairments. Continue treatment per established plan of care. Discharge Recommendations:  Home Health  Further Equipment Recommendations for Discharge:  none     SUBJECTIVE:   Patient stated Will I ever be normal again? Orenfrancine Castro   The patient stated 3/3 back precautions. Reviewed all 3 with patient. OBJECTIVE DATA SUMMARY:   Critical Behavior:  Neurologic State: Alert  Orientation Level: Oriented X4  Cognition: Appropriate decision making, Appropriate for age attention/concentration, Appropriate safety awareness  Safety/Judgement: Awareness of environment, Good awareness of safety precautions  Functional Mobility Training:  Bed Mobility:  Log Rolling: Supervision  Supine to Sit: Supervision  Sit to Supine: Supervision  Scooting: Supervision        Brace donned with  minimal assistance/contact guard assist   Transfers:  Sit to Stand: Supervision  Stand to Sit: Supervision                             Balance:  Sitting: Intact  Standing: Impaired; Without support  Standing - Static: Fair  Standing - Dynamic : Fair  Ambulation/Gait Training:  Distance (ft): 80 Feet (ft)  Assistive Device: Brace/Splint;Gait belt;Walker, rolling  Ambulation - Level of Assistance: Contact guard assistance        Gait Abnormalities: Decreased step clearance; Antalgic        Base of Support: Narrowed     Speed/Vivian: Pace decreased (<100 feet/min)  Step Length: Right shortened;Left shortened            Pain:  Pain Scale 1: Numeric (0 - 10)  Pain Intensity 1: 4  Pain Location 1: Leg  Pain Orientation 1: Left  Pain Description 1: Aching  Pain Intervention(s) 1: Medication (see MAR)  After treatment:   []  Patient left in no apparent distress sitting up in chair  [x]  Patient left in no apparent distress in bed  [x]  Call bell left within reach  [x]  Nursing notified  [] Caregiver present  []  Bed alarm activated    COMMUNICATION/COLLABORATION:   The patients plan of care was discussed with: Registered Nurse    Linette Fish, PT   Time Calculation: 16 mins

## 2018-07-21 NOTE — PROGRESS NOTES
Patient complaining of extreme pain in legs when sitting in chair to the point she had to get immediately get back into bed. Stated MD had not come in to see her. Paged for on call regarding pain.

## 2018-07-21 NOTE — ROUTINE PROCESS
Bedside shift change report given to Bridger (oncoming nurse) by Dewayne Fierro (offgoing nurse). Report included the following information SBAR, Kardex, OR Summary, Procedure Summary, Intake/Output and MAR.

## 2018-07-22 LAB
GLUCOSE BLD STRIP.AUTO-MCNC: 113 MG/DL (ref 65–100)
GLUCOSE BLD STRIP.AUTO-MCNC: 170 MG/DL (ref 65–100)
GLUCOSE BLD STRIP.AUTO-MCNC: 186 MG/DL (ref 65–100)
GLUCOSE BLD STRIP.AUTO-MCNC: 213 MG/DL (ref 65–100)
SERVICE CMNT-IMP: ABNORMAL

## 2018-07-22 PROCEDURE — 94760 N-INVAS EAR/PLS OXIMETRY 1: CPT

## 2018-07-22 PROCEDURE — 74011250637 HC RX REV CODE- 250/637: Performed by: NURSE PRACTITIONER

## 2018-07-22 PROCEDURE — 77010033678 HC OXYGEN DAILY

## 2018-07-22 PROCEDURE — 74011250636 HC RX REV CODE- 250/636: Performed by: PHYSICIAN ASSISTANT

## 2018-07-22 PROCEDURE — 82962 GLUCOSE BLOOD TEST: CPT

## 2018-07-22 PROCEDURE — 74011636637 HC RX REV CODE- 636/637: Performed by: PHYSICIAN ASSISTANT

## 2018-07-22 PROCEDURE — 65270000032 HC RM SEMIPRIVATE

## 2018-07-22 PROCEDURE — 97116 GAIT TRAINING THERAPY: CPT

## 2018-07-22 PROCEDURE — 74011250637 HC RX REV CODE- 250/637: Performed by: PHYSICIAN ASSISTANT

## 2018-07-22 RX ADMIN — BUPROPION HYDROCHLORIDE 150 MG: 150 TABLET, EXTENDED RELEASE ORAL at 08:33

## 2018-07-22 RX ADMIN — PRAVASTATIN SODIUM 80 MG: 40 TABLET ORAL at 21:22

## 2018-07-22 RX ADMIN — SENNOSIDES AND DOCUSATE SODIUM 1 TABLET: 8.6; 5 TABLET ORAL at 08:33

## 2018-07-22 RX ADMIN — INSULIN LISPRO 2 UNITS: 100 INJECTION, SOLUTION INTRAVENOUS; SUBCUTANEOUS at 06:55

## 2018-07-22 RX ADMIN — MULTIPLE VITAMINS W/ MINERALS TAB 1 TABLET: TAB at 08:33

## 2018-07-22 RX ADMIN — OXYCODONE HYDROCHLORIDE 5 MG: 5 TABLET ORAL at 08:33

## 2018-07-22 RX ADMIN — CLINDAMYCIN PHOSPHATE: 10 GEL TOPICAL at 08:36

## 2018-07-22 RX ADMIN — INSULIN LISPRO 3 UNITS: 100 INJECTION, SOLUTION INTRAVENOUS; SUBCUTANEOUS at 12:33

## 2018-07-22 RX ADMIN — OXYCODONE HYDROCHLORIDE 5 MG: 5 TABLET ORAL at 12:34

## 2018-07-22 RX ADMIN — DEXAMETHASONE SODIUM PHOSPHATE 6 MG: 4 INJECTION, SOLUTION INTRAMUSCULAR; INTRAVENOUS at 06:55

## 2018-07-22 RX ADMIN — ACETAMINOPHEN 650 MG: 325 TABLET, FILM COATED ORAL at 12:34

## 2018-07-22 RX ADMIN — Medication 10 ML: at 17:08

## 2018-07-22 RX ADMIN — ACETAMINOPHEN 650 MG: 325 TABLET, FILM COATED ORAL at 17:05

## 2018-07-22 RX ADMIN — SERTRALINE HYDROCHLORIDE 100 MG: 50 TABLET ORAL at 08:33

## 2018-07-22 RX ADMIN — SENNOSIDES AND DOCUSATE SODIUM 1 TABLET: 8.6; 5 TABLET ORAL at 17:05

## 2018-07-22 RX ADMIN — METFORMIN HYDROCHLORIDE 500 MG: 500 TABLET, EXTENDED RELEASE ORAL at 17:05

## 2018-07-22 RX ADMIN — INSULIN LISPRO 2 UNITS: 100 INJECTION, SOLUTION INTRAVENOUS; SUBCUTANEOUS at 17:04

## 2018-07-22 RX ADMIN — ACETAMINOPHEN 650 MG: 325 TABLET, FILM COATED ORAL at 06:55

## 2018-07-22 RX ADMIN — CARVEDILOL 3.12 MG: 3.12 TABLET, FILM COATED ORAL at 08:33

## 2018-07-22 RX ADMIN — OXAZEPAM 15 MG: 15 CAPSULE, GELATIN COATED ORAL at 21:22

## 2018-07-22 RX ADMIN — Medication 10 ML: at 06:55

## 2018-07-22 NOTE — PROGRESS NOTES
Problem: Mobility Impaired (Adult and Pediatric)  Goal: *Acute Goals and Plan of Care (Insert Text)  Physical Therapy Goals  Initiated 7/20/2018    1. Patient will move from supine to sit and sit to supine , scoot up and down and roll side to side in bed with modified independence within 4 days. 2. Patient will perform sit to stand with modified independence within 4 days. 3. Patient will ambulate with modified independence for 150 feet with the least restrictive device within 4 days. 4. Patient will verbalize and demonstrate understanding of spinal precautions (No bending, lifting greater than 5 lbs, or twisting; log-roll technique; frequent repositioning as instructed) within 4 days. physical Therapy TREATMENT  Patient: Candida Bassett (56 y.o. female)  Date: 7/22/2018  Diagnosis: Bilateral stenosis of lateral recess of lumbar spine [M48.061] <principal problem not specified>  Procedure(s) (LRB):  L1-2 LAMINECTOMY WITH RODNEY OSTEOTOMY WITH EXTENSION OF FUSION TO T10 (OXYGEN), DURA REPAIR (N/A) 6 Days Post-Op  Precautions: Back, Fall (Brace)  Chart, physical therapy assessment, plan of care and goals were reviewed. ASSESSMENT:  Patient presents with some improvement in her mobility status. She is able to ambulate short distances with cga. She is at a cga level across the board in terms of mobility. Patient expressed interest in going to [address] [place] [address] for inpatient rehab before returning home. She lives at home independently and her children are not able to provide the consistent assistance that she will need. PT feels that inpatient rehab is an appropriate dc destination for her. Progression toward goals:  []    Improving appropriately and progressing toward goals  [x]    Improving slowly and progressing toward goals  []    Not making progress toward goals and plan of care will be adjusted     PLAN:  Patient continues to benefit from skilled intervention to address the above impairments.   Continue treatment per established plan of care. Discharge Recommendations:  Inpatient Rehab  Further Equipment Recommendations for Discharge:  None needed at this time      SUBJECTIVE:   Patient stated I can wait for lunch I know my therapy is more important.     OBJECTIVE DATA SUMMARY:   Critical Behavior:  Neurologic State: Alert  Orientation Level: Oriented X4  Cognition: Appropriate for age attention/concentration, Follows commands  Safety/Judgement: Awareness of environment, Good awareness of safety precautions  Functional Mobility Training:  Bed Mobility:     Supine to Sit: Contact guard assistance              Transfers:  Sit to Stand: Contact guard assistance                                Balance:  Sitting: Intact  Standing: Intact; With support  Ambulation/Gait Training:  Distance (ft): 120 Feet (ft)  Assistive Device: Gait belt  Ambulation - Level of Assistance: Contact guard assistance     Gait Description (WDL): Exceptions to WDL  Gait Abnormalities: Scissoring        Base of Support: Narrowed     Speed/Vivian: Slow  Step Length: Right shortened;Left shortened                    Stairs:              Neuro Re-Education:    Therapeutic Exercises:     Pain:  Pain Scale 1: Numeric (0 - 10)  Pain Intensity 1: 2  Pain Location 1: Back  Pain Orientation 1: Lower  Pain Description 1: Aching  Pain Intervention(s) 1: Medication (see MAR)  Activity Tolerance:     Please refer to the flowsheet for vital signs taken during this treatment.   After treatment:   []    Patient left in no apparent distress sitting up in chair  [x]    Patient left in no apparent distress in bed  [x]    Call bell left within reach  [x]    Nursing notified  []    Caregiver present  []    Bed alarm activated    COMMUNICATION/COLLABORATION:   The patients plan of care was discussed with: Registered Nurse    Sheba Law, PT   Time Calculation: 14 mins

## 2018-07-22 NOTE — ROUTINE PROCESS
Bedside shift change report given to Bridger (oncoming nurse) by Vance (offgoing nurse). Report included the following information SBAR, Kardex, OR Summary, Procedure Summary, Intake/Output and MAR.

## 2018-07-22 NOTE — PROGRESS NOTES
ORTHO POST OP SPINE PROGRESS NOTE    2018  Admit Date: 2018  Admit Diagnosis: Bilateral stenosis of lateral recess of lumbar spine [M48.061]  Procedure: Procedure(s):  L1-2 LAMINECTOMY WITH RODNEY OSTEOTOMY WITH EXTENSION OF FUSION TO T10 (OXYGEN), DURA REPAIR  Post Op day: 6 Days Post-Op    Subjective:     Anaya Granger is a patient who has no complaints. Feeling much better this am.  No headaches. LLE pain improved. Review of Systems: A review of systems was negative. Objective:     PT/OT:   Distance Ambulated:           Time Ambulated (min):        Ambulation Response: Activity Response: Fairly tolerated  Assistive Device:              Assistive Device: Fall prevention device    Vital Signs:    Blood pressure 159/90, pulse 70, temperature 98.7 °F (37.1 °C), resp. rate 16, height 5' 4\" (1.626 m), weight 86.6 kg (191 lb), SpO2 96 %. Temp (24hrs), Av.5 °F (36.9 °C), Min:98.2 °F (36.8 °C), Max:98.7 °F (37.1 °C)          0701 -  1900  In: -   Out: 1500 [Urine:1500]    LAB:    Recent Labs      18   1535   HGB  9.8*   WBC  6.3   PLT  168     Physical Exam:  General:  Alert and oriented. No acute distress. Heart:  Respirations unlabored. Abdomen:   Extremities: Soft, non-tender. No evidence of cyanosis. Pulses palpable in both upper and lower extremities.         Neurologic:  Musculoskeletal:  No new motor deficits. Neurovascular exam within normal limits.  Sensation stable.  Motor: unchanged C5-T1 and L2-S1. Ez's sign negative in bilateral lower extremities.  Calves soft, nontender upon palpation and with passive twitch.  Moves both upper and lower extremities. Incision: Mild shadowing.   intact         Assessment:      Patient Active Problem List   Diagnosis Code    Controlled type 2 diabetes mellitus with stage 2 chronic kidney disease, with long-term current use of insulin ([facility]) E11.22, N18.2, Z79.4    Non-seasonal allergic rhinitis J30.89    Class 1 obesity due to excess calories without serious comorbidity with body mass index (BMI) of 33.0 to 33.9 in adult E66.09, Z68.33    Rosacea L71.9    Mixed hyperlipidemia E78.2    Anxiety F41.9    GI bleed K92.2    Overactive bladder N32.81    Polymyalgia rheumatica (HCC) M35.3    IBS (irritable bowel syndrome) K58.9    Hypertension with renal disease I12.9    CKD (chronic kidney disease), stage II N18.2    Avascular necrosis of hip ([facility]) M87.059    Abnormal LFTs R94.5    Annual physical exam Z00.00    Vitamin D deficiency E55.9    Recurrent depression ([facility]) F33.9    Primary osteoarthritis involving multiple joints M15.0    Medicare annual wellness visit, initial Z00.00    Sleep disturbance G47.9    Pre-operative cardiovascular examination Z01.810    Lumbar disc disease M51.9    Spinal stenosis, lumbar M48.061       Plan:     1.  Continue established methods of pain control  2.  VTE Prophylaxes - TEDS & SCDs   3.  Encouraged use of ICS   4.   Improving, will monitor progress with pt/ot today and possibly D/C home tomorrow    Signed By: Isa Carrasquillo DO

## 2018-07-23 LAB
GLUCOSE BLD STRIP.AUTO-MCNC: 114 MG/DL (ref 65–100)
GLUCOSE BLD STRIP.AUTO-MCNC: 116 MG/DL (ref 65–100)
GLUCOSE BLD STRIP.AUTO-MCNC: 132 MG/DL (ref 65–100)
GLUCOSE BLD STRIP.AUTO-MCNC: 204 MG/DL (ref 65–100)
SERVICE CMNT-IMP: ABNORMAL

## 2018-07-23 PROCEDURE — 82962 GLUCOSE BLOOD TEST: CPT

## 2018-07-23 PROCEDURE — 74011636637 HC RX REV CODE- 636/637: Performed by: PHYSICIAN ASSISTANT

## 2018-07-23 PROCEDURE — 74011250637 HC RX REV CODE- 250/637: Performed by: NURSE PRACTITIONER

## 2018-07-23 PROCEDURE — 97535 SELF CARE MNGMENT TRAINING: CPT

## 2018-07-23 PROCEDURE — 65270000032 HC RM SEMIPRIVATE

## 2018-07-23 PROCEDURE — 74011250637 HC RX REV CODE- 250/637: Performed by: ORTHOPAEDIC SURGERY

## 2018-07-23 PROCEDURE — 74011250637 HC RX REV CODE- 250/637: Performed by: PHYSICIAN ASSISTANT

## 2018-07-23 PROCEDURE — 97530 THERAPEUTIC ACTIVITIES: CPT

## 2018-07-23 PROCEDURE — 97116 GAIT TRAINING THERAPY: CPT

## 2018-07-23 RX ORDER — GABAPENTIN 300 MG/1
300 CAPSULE ORAL 3 TIMES DAILY
Status: DISCONTINUED | OUTPATIENT
Start: 2018-07-23 | End: 2018-07-25 | Stop reason: HOSPADM

## 2018-07-23 RX ORDER — LOPERAMIDE HYDROCHLORIDE 2 MG/1
2 CAPSULE ORAL AS NEEDED
Status: DISCONTINUED | OUTPATIENT
Start: 2018-07-23 | End: 2018-07-25 | Stop reason: HOSPADM

## 2018-07-23 RX ADMIN — OXYCODONE HYDROCHLORIDE 5 MG: 5 TABLET ORAL at 02:44

## 2018-07-23 RX ADMIN — METFORMIN HYDROCHLORIDE 500 MG: 500 TABLET, EXTENDED RELEASE ORAL at 17:26

## 2018-07-23 RX ADMIN — OXAZEPAM 15 MG: 15 CAPSULE, GELATIN COATED ORAL at 21:34

## 2018-07-23 RX ADMIN — CLINDAMYCIN PHOSPHATE: 10 GEL TOPICAL at 08:55

## 2018-07-23 RX ADMIN — MULTIPLE VITAMINS W/ MINERALS TAB 1 TABLET: TAB at 08:53

## 2018-07-23 RX ADMIN — GABAPENTIN 300 MG: 300 CAPSULE ORAL at 17:26

## 2018-07-23 RX ADMIN — GABAPENTIN 300 MG: 300 CAPSULE ORAL at 21:30

## 2018-07-23 RX ADMIN — PRAVASTATIN SODIUM 80 MG: 40 TABLET ORAL at 21:30

## 2018-07-23 RX ADMIN — LOPERAMIDE HYDROCHLORIDE 2 MG: 2 CAPSULE ORAL at 12:03

## 2018-07-23 RX ADMIN — OXYCODONE HYDROCHLORIDE 10 MG: 5 TABLET ORAL at 21:30

## 2018-07-23 RX ADMIN — ACETAMINOPHEN 650 MG: 325 TABLET, FILM COATED ORAL at 12:03

## 2018-07-23 RX ADMIN — DIAZEPAM 5 MG: 5 TABLET ORAL at 00:34

## 2018-07-23 RX ADMIN — BUPROPION HYDROCHLORIDE 150 MG: 150 TABLET, EXTENDED RELEASE ORAL at 08:52

## 2018-07-23 RX ADMIN — ACETAMINOPHEN 650 MG: 325 TABLET, FILM COATED ORAL at 00:34

## 2018-07-23 RX ADMIN — GABAPENTIN 300 MG: 300 CAPSULE ORAL at 08:53

## 2018-07-23 RX ADMIN — SENNOSIDES AND DOCUSATE SODIUM 1 TABLET: 8.6; 5 TABLET ORAL at 08:51

## 2018-07-23 RX ADMIN — INSULIN LISPRO 3 UNITS: 100 INJECTION, SOLUTION INTRAVENOUS; SUBCUTANEOUS at 17:25

## 2018-07-23 RX ADMIN — SERTRALINE HYDROCHLORIDE 100 MG: 50 TABLET ORAL at 08:53

## 2018-07-23 RX ADMIN — Medication 10 ML: at 21:30

## 2018-07-23 RX ADMIN — ACETAMINOPHEN 650 MG: 325 TABLET, FILM COATED ORAL at 17:26

## 2018-07-23 RX ADMIN — DIAZEPAM 5 MG: 5 TABLET ORAL at 08:59

## 2018-07-23 RX ADMIN — CARVEDILOL 3.12 MG: 3.12 TABLET, FILM COATED ORAL at 08:53

## 2018-07-23 NOTE — PROGRESS NOTES
Orthopedic Spine Progress Note  Post Op day: 7 Days Post-Op    2018 7:59 AM   Admit Date: 2018  Procedure: Procedure(s):  L1-2 LAMINECTOMY WITH RODNEY OSTEOTOMY WITH EXTENSION OF FUSION TO T10 (OXYGEN), DURA REPAIR    Subjective:     Manual Sic has complaints of left leg pain. Tolerating diet. No N/V. Pain Control:   Pain Assessment  Pain Scale 1: Numeric (0 - 10)  Pain Intensity 1: 3  Pain Onset 1: S/P post op surgical pain. Pain Location 1: Leg, Hip  Pain Orientation 1: Left  Pain Description 1: Aching  Pain Intervention(s) 1: Medication (see MAR)    Objective:          Physical Exam:  General:  Alert and oriented. No acute distress. Heart:  Respirations unlabored. Abdomen:   Extremities: Soft, non-tender. No evidence of cyanosis. Pulses palpable in both upper and lower extremities. Neurologic:  Musculoskeletal:  No new motor deficits. Neurovascular exam within normal limits. Sensation stable. Motor: unchanged C5-T1 and L2-S1. Ez's sign negative in bilateral lower extremities. Calves soft, nontender upon palpation and with passive twitch. Moves both upper and lower extremities. Incision: clean, dry, and intact. No significant erythema or swelling. No active drainage noted. Moderate drainage noted on bandage. Vital Signs:    Blood pressure 126/74, pulse 71, temperature 98.4 °F (36.9 °C), resp. rate 16, height 5' 4\" (1.626 m), weight 86.6 kg (191 lb), SpO2 96 %. Temp (24hrs), Av.3 °F (36.8 °C), Min:97.3 °F (36.3 °C), Max:98.9 °F (37.2 °C)      LAB:    No results for input(s): HCT, HGB, PLT, HCTEXT, HGBEXT, PLTEXT in the last 72 hours.   Lab Results   Component Value Date/Time    Sodium 138 2018 03:35 PM    Potassium 3.6 2018 03:35 PM    Chloride 105 2018 03:35 PM    CO2 27 2018 03:35 PM    Glucose 124 (H) 2018 03:35 PM    BUN 7 2018 03:35 PM    Creatinine 0.57 2018 03:35 PM    Calcium 8.2 (L) 2018 03:35 PM Intake/Output:        PT/OT:   Gait:  Gait  Base of Support: Narrowed  Speed/Vivian: Slow  Step Length: Right shortened, Left shortened  Gait Abnormalities: Scissoring  Ambulation - Level of Assistance: Contact guard assistance  Distance (ft): 120 Feet (ft)  Assistive Device: Gait belt                 Assessment:   Patient is 7 Days Post-Op s/p Procedure(s):  L1-2 LAMINECTOMY WITH RODNEY OSTEOTOMY WITH EXTENSION OF FUSION TO T10 (OXYGEN), DURA REPAIR    Plan:     1. Continue PT/OT  2. Continue established methods of pain control-add Gabapentin tid for leg pain  3. VTE Prophylaxes - TEDS &/or SCDs   4.  Discharge pending-to [facility] pending authorization  5.  6.       Signed By: MARILYN Billingsley

## 2018-07-23 NOTE — PROGRESS NOTES
[facility] accepted pending insurance authorization. CM will continue to follow patient's disposition.   NAA Lazaro, CRM

## 2018-07-23 NOTE — PROGRESS NOTES
Problem: Self Care Deficits Care Plan (Adult)  Goal: *Acute Goals and Plan of Care (Insert Text)  Occupational Therapy Goals  Initiated 7/21/2018  1. Patient will perform lower body dressing with min assistance  using AE PRN within 7 days. 2.  Patient will perform toileting with supervision/set-up using most appropriate DME within 7 days. 3.  Patient will bathing at moderate assistance  within 7 days. 4.  Patient will don/doff back brace supervision within 7 days. 5.  Patient will verbalize/demonstrate 3/3 back precautions during ADL tasks without cues within 7 days. Occupational Therapy TREATMENT  Patient: Goyo Bertrand (2 y.o. female)  Date: 7/23/2018  Diagnosis: Bilateral stenosis of lateral recess of lumbar spine [M48.061] <principal problem not specified>  Procedure(s) (LRB):  L1-2 LAMINECTOMY WITH RODNEY OSTEOTOMY WITH EXTENSION OF FUSION TO T10 (OXYGEN), DURA REPAIR (N/A) 7 Days Post-Op  Precautions: Back, Fall (Brace)  Chart, occupational therapy assessment, plan of care, and goals were reviewed. ASSESSMENT:  Patient received in supine, agreeable to therapy. Able to complete bed mobility with CGA-Min A for trunk righting and maintenance of spinal precautions. Upper body dressing continues to be limited by RUE pain and decreased ROM, requiring Min A. Attempted transfer to the chair with pillow support for BLE and CGA-Min A, patient unable to tolerate d/t LLE pain (lateral hip). Returned to supine with CGA, patient able to roll to R sidelying with supervision. Education provided on pacing, energy conservation, and increasing upright sitting tolerance as able to maximize activity tolerance and independence with ADLs. Continue to recommend patient d/c to inpatient rehab, as she lives alone, has very limited activity tolerance, spinal precautions limiting ADLs, and decreased RUE ROM d/t RTC, able to tolerate 3 hours/day of therapy.     Progression toward goals:  []       Improving appropriately and progressing toward goals  [x]       Improving slowly and progressing toward goals  []       Not making progress toward goals and plan of care will be adjusted     PLAN:  Patient continues to benefit from skilled intervention to address the above impairments. Continue treatment per established plan of care. Discharge Recommendations:  Inpatient Rehab  Further Equipment Recommendations for Discharge:  TBD by rehab     SUBJECTIVE:   Patient stated I have a reacher and the sock thing. I got them when I had hip surgery.     OBJECTIVE DATA SUMMARY:   Cognitive/Behavioral Status:  Neurologic State: Alert  Orientation Level: Oriented X4  Cognition: Appropriate for age attention/concentration; Follows commands  Perception: Appears intact  Perseveration: No perseveration noted  Safety/Judgement: Awareness of environment; Fall prevention    Functional Mobility and Transfers for ADLs:  Bed Mobility:  Rolling: Supervision  Supine to Sit: Minimum assistance (for trunk support, cues for twisting)  Sit to Supine: Contact guard assistance  Scooting: Supervision (cues for bending)    Transfers:  Sit to Stand: Contact guard assistance  Bed to Chair: Minimum assistance (for descent into chair)    Balance:  Sitting: Intact; Without support  Standing: Impaired  Standing - Static: Good  Standing - Dynamic : Fair    ADL Intervention:     Grooming  Grooming Assistance:  (completed this morning)    Upper Body Dressing Assistance  Dressing Assistance: Minimum assistance  Orthotics(Brace): Minimum assistance (around R side d/t R RTC)  Front Opened Shirt: Minimum assistance (bringing sweater around R side)  Cues: Physical assistance;Verbal cues provided    Cognitive Retraining  Safety/Judgement: Awareness of environment; Fall prevention    Educated on increasing upright activity tolerance with pacing/rest breaks, sitting with meals and medications to prevent aspiration    Therapeutic Exercises:   - Functional transfers completed with CGA, brief ambulation to/from chair with CGA d/t weakness (unable to tolerate sitting in chair)    Pain:  Pain Scale 1: Numeric (0 - 10)  Pain Intensity 1: 5  Pain Location 1: Leg;Hip  Pain Orientation 1: Left  Pain Description 1: Cramping  Pain Intervention(s) 1: Medication (see MAR)  Activity Tolerance:   Fair, limited by LLE pain    Please refer to the flowsheet for vital signs taken during this treatment.   After treatment:   [] Patient left in no apparent distress sitting up in chair  [x] Patient left in no apparent distress in bed  [x] Call bell left within reach  [x] Nursing notified  [] Caregiver present  [] Bed alarm activated    COMMUNICATION/COLLABORATION:   The patients plan of care was discussed with: Physical Therapist and Registered Nurse    Idamae Hodgkins, OT  Time Calculation: 26 mins

## 2018-07-23 NOTE — PROGRESS NOTES
NUTRITION- DIETETIC tECHnICIAN    Pt seen for:       [x]                  Rescreen  []                  Food preferences/tolerances  []                  Food Allergies  []                  PO intake check  []                  Supplements  []                  Diet order clarification  []                  Education  []                  Other     Rescreen:    [x]                  Not at Nutrition Risk, rescreen per screening protocol  []                  At Nutrition Risk- RD referral         SUBJECTIVE/OBJECTIVE:     Information obtained from:  patient      Diet:  Regular Consistent Carbohydrate 1800 Kcal      Intake: unsatisfactory    Patient Vitals for the past 100 hrs:   % Diet Eaten   07/19/18 1745 10 %       Weight Changes: Wt Readings from Last 5 Encounters:   07/16/18 86.6 kg (191 lb)   07/11/18 87.4 kg (192 lb 9.6 oz)   07/09/18 86.6 kg (191 lb)   07/06/18 86.1 kg (189 lb 12.8 oz)   04/09/18 88.4 kg (194 lb 12.8 oz)       Problems Identified:      [x]                  Patient admitted with spinal stenosis. No po intake documented in chart x 7 days. Patient reports poor appetite and no desire to eat. Usual appetite very good pta.   Suggest adding an oral nutritional supplement to help with kcal/protein intake until po optimal.   []                  Specified food preferences   []                  Dislikes supplements              []                  Allergies:   []                  Difficulty chewing      []                  Dentition    []                  Nausea/Vomiting   []                  Constipation   []                  Diarrhea    PLAN:     [x]                   Continue current diet and encourage intake  [x]                   Recommend adding Glucerna with all meals as per above  []                   Dislikes supplements will try a substitution  []                   Modify diet for food allergies  []                   Adjust texture due to difficulty chewing   []                   Educated patient  []                   RD Referral  [x]                   Rescreen per screening protocol          Luis Morse, DTR

## 2018-07-23 NOTE — PROGRESS NOTES
Problem: Mobility Impaired (Adult and Pediatric)  Goal: *Acute Goals and Plan of Care (Insert Text)  Physical Therapy Goals  Initiated 7/20/2018    1. Patient will move from supine to sit and sit to supine , scoot up and down and roll side to side in bed with modified independence within 4 days. 2. Patient will perform sit to stand with modified independence within 4 days. 3. Patient will ambulate with modified independence for 150 feet with the least restrictive device within 4 days. 4. Patient will verbalize and demonstrate understanding of spinal precautions (No bending, lifting greater than 5 lbs, or twisting; log-roll technique; frequent repositioning as instructed) within 4 days. physical Therapy TREATMENT  Patient: Janel Hernadez (38 y.o. female)  Date: 7/23/2018  Diagnosis: Bilateral stenosis of lateral recess of lumbar spine [M48.061] <principal problem not specified>  Procedure(s) (LRB):  L1-2 LAMINECTOMY WITH RODNEY OSTEOTOMY WITH EXTENSION OF FUSION TO T10 (OXYGEN), DURA REPAIR (N/A) 7 Days Post-Op  Precautions: Back, Fall,No bending, no lifting greater than 5 lbs, no twisting, log-roll technique, repositioning every 20-30 min except when sleeping, brace when OOB    Chart, physical therapy assessment, plan of care and goals were reviewed. ASSESSMENT:  Pt received in bed, side lying and wearing quickdraw brace, pt made aware that she does not need to wear brace in bed however pt reports the brace felt comfortable, pt transferred from side lying to sit with bed rail and supervision, sit <> stand with CGA, pt ambulated  X 120 feet with mild unsteadiness, narrow base of support with mild trunk sway and path deviations noted, pt reports an improvement in her pain control and stated \"this is the first time I have walked without having pain in my hip\". Pt reports mild back pain, 2/10 with ambulation. She recalls all back precautions and was observed adhering to these with mobility.  Recommend inpatient rehab to maximize safe, functional independence and steady gait. Progression toward goals:  []      Improving appropriately and progressing toward goals  [x]      Improving slowly and progressing toward goals  []      Not making progress toward goals and plan of care will be adjusted     PLAN:  Patient continues to benefit from skilled intervention to address the above impairments. Continue treatment per established plan of care. Discharge Recommendations:  Inpatient Rehab  Further Equipment Recommendations for Discharge:  May need cane versus rolling walker for safe ambulation, will continue to assess pending progress      SUBJECTIVE:   Patient stated I have not been comfortable in that chair but I can try it again.   Placed 2 pillows behind her back and pt reported that was more comfortable. The patient stated 3/3 back precautions. Reviewed all 3 with patient.     OBJECTIVE DATA SUMMARY:   Critical Behavior:  Neurologic State: Alert  Orientation Level: Oriented X4  Cognition: Appropriate for age attention/concentration, Follows commands  Safety/Judgement: Awareness of environment, Fall prevention  Functional Mobility Training:  Bed Mobility:  Received in side lying and wearing quickdraw, pt reports she is comfortable wearing brace in bed, pt made aware that she does not need to wear brace in bed  Side lying to Sit: Supervision and use of bed rail  Scooting: Supervision          Transfers:  Sit to Stand: Assist x1;Contact guard assistance  Stand to Sit: Contact guard assistance;Assist x1                          Balance:  Sitting: Intact  Standing: Impaired  Standing - Static: Good  Standing - Dynamic : Fair  Ambulation/Gait Training:  Distance (ft): 120 Feet (ft)  Assistive Device: Brace/Splint;Gait belt  Ambulation - Level of Assistance: Assist x1;Contact guard assistance        Gait Abnormalities: Decreased step clearance;Trunk sway increased;mild Path deviations        Base of Support: Narrowed Speed/Vivian: Slow  Step Length: Left shortened;Right shortened              Pain:  Pain Scale 1: Numeric (0 - 10)  Pain Intensity 1: 2  Pain Location 1:back  Pain Intervention(s) 1: Medication (see MAR)  Activity Tolerance:   Good, reports improved pain control    After treatment:   [x]  Patient left in no apparent distress sitting up in chair  []  Patient left in no apparent distress in bed  [x]  Call bell left within reach  [x]  Nursing notified  []  Caregiver present  []  Bed alarm activated    COMMUNICATION/COLLABORATION:   The patients plan of care was discussed with: Registered Nurse    Mandi Shipley   Time Calculation: 11 mins

## 2018-07-24 LAB
GLUCOSE BLD STRIP.AUTO-MCNC: 116 MG/DL (ref 65–100)
GLUCOSE BLD STRIP.AUTO-MCNC: 137 MG/DL (ref 65–100)
GLUCOSE BLD STRIP.AUTO-MCNC: 154 MG/DL (ref 65–100)
GLUCOSE BLD STRIP.AUTO-MCNC: 157 MG/DL (ref 65–100)
SERVICE CMNT-IMP: ABNORMAL

## 2018-07-24 PROCEDURE — 97535 SELF CARE MNGMENT TRAINING: CPT

## 2018-07-24 PROCEDURE — 74011250637 HC RX REV CODE- 250/637: Performed by: PHYSICIAN ASSISTANT

## 2018-07-24 PROCEDURE — 97116 GAIT TRAINING THERAPY: CPT

## 2018-07-24 PROCEDURE — 77010033678 HC OXYGEN DAILY

## 2018-07-24 PROCEDURE — 94760 N-INVAS EAR/PLS OXIMETRY 1: CPT

## 2018-07-24 PROCEDURE — 74011250637 HC RX REV CODE- 250/637: Performed by: NURSE PRACTITIONER

## 2018-07-24 PROCEDURE — 82962 GLUCOSE BLOOD TEST: CPT

## 2018-07-24 PROCEDURE — 65270000032 HC RM SEMIPRIVATE

## 2018-07-24 PROCEDURE — 74011636637 HC RX REV CODE- 636/637: Performed by: PHYSICIAN ASSISTANT

## 2018-07-24 RX ORDER — GABAPENTIN 300 MG/1
300 CAPSULE ORAL 3 TIMES DAILY
Qty: 90 CAP | Refills: 1 | Status: SHIPPED | OUTPATIENT
Start: 2018-07-24 | End: 2018-08-28 | Stop reason: ALTCHOICE

## 2018-07-24 RX ADMIN — CLINDAMYCIN PHOSPHATE: 10 GEL TOPICAL at 17:16

## 2018-07-24 RX ADMIN — CLINDAMYCIN PHOSPHATE: 10 GEL TOPICAL at 09:15

## 2018-07-24 RX ADMIN — PRAVASTATIN SODIUM 80 MG: 40 TABLET ORAL at 21:08

## 2018-07-24 RX ADMIN — OXYCODONE HYDROCHLORIDE 5 MG: 5 TABLET ORAL at 21:08

## 2018-07-24 RX ADMIN — METFORMIN HYDROCHLORIDE 500 MG: 500 TABLET, EXTENDED RELEASE ORAL at 17:15

## 2018-07-24 RX ADMIN — Medication 10 ML: at 21:08

## 2018-07-24 RX ADMIN — GABAPENTIN 300 MG: 300 CAPSULE ORAL at 09:14

## 2018-07-24 RX ADMIN — Medication 10 ML: at 06:48

## 2018-07-24 RX ADMIN — SERTRALINE HYDROCHLORIDE 100 MG: 50 TABLET ORAL at 09:14

## 2018-07-24 RX ADMIN — INSULIN LISPRO 2 UNITS: 100 INJECTION, SOLUTION INTRAVENOUS; SUBCUTANEOUS at 17:15

## 2018-07-24 RX ADMIN — CYCLOBENZAPRINE HYDROCHLORIDE 10 MG: 10 TABLET, FILM COATED ORAL at 06:48

## 2018-07-24 RX ADMIN — CARVEDILOL 3.12 MG: 3.12 TABLET, FILM COATED ORAL at 09:14

## 2018-07-24 RX ADMIN — ACETAMINOPHEN 650 MG: 325 TABLET, FILM COATED ORAL at 06:48

## 2018-07-24 RX ADMIN — BUPROPION HYDROCHLORIDE 150 MG: 150 TABLET, EXTENDED RELEASE ORAL at 09:14

## 2018-07-24 RX ADMIN — GABAPENTIN 300 MG: 300 CAPSULE ORAL at 17:15

## 2018-07-24 RX ADMIN — ACETAMINOPHEN 650 MG: 325 TABLET, FILM COATED ORAL at 17:15

## 2018-07-24 RX ADMIN — OXAZEPAM 15 MG: 15 CAPSULE, GELATIN COATED ORAL at 21:08

## 2018-07-24 RX ADMIN — GABAPENTIN 300 MG: 300 CAPSULE ORAL at 21:08

## 2018-07-24 NOTE — PROGRESS NOTES
Problem: Mobility Impaired (Adult and Pediatric)  Goal: *Acute Goals and Plan of Care (Insert Text)  Physical Therapy Goals  Initiated 7/20/2018    1. Patient will move from supine to sit and sit to supine , scoot up and down and roll side to side in bed with modified independence within 4 days. 2. Patient will perform sit to stand with modified independence within 4 days. 3. Patient will ambulate with modified independence for 150 feet with the least restrictive device within 4 days. 4. Patient will verbalize and demonstrate understanding of spinal precautions (No bending, lifting greater than 5 lbs, or twisting; log-roll technique; frequent repositioning as instructed) within 4 days. physical Therapy TREATMENT  Patient: Dominique Rocha (23 y.o. female)  Date: 7/24/2018  Diagnosis: Bilateral stenosis of lateral recess of lumbar spine [M48.061] <principal problem not specified>  Procedure(s) (LRB):  L1-2 LAMINECTOMY WITH RODNEY OSTEOTOMY WITH EXTENSION OF FUSION TO T10 (OXYGEN), DURA REPAIR (N/A) 8 Days Post-Op  Precautions: Back, Fall, No bending, no lifting greater than 5 lbs, no twisting, log-roll technique, repositioning every 20-30 min except when sleeping, brace when OOB    Chart, physical therapy assessment, plan of care and goals were reviewed.     ASSESSMENT:  Pt is making slow, steady progress, received in side lying and transferred to sit with use of bed rail and supervision, pt donned brace with set up and verbal cueing, stands with CGA, trial with straight cane x 150 feet with CGA, pt continues to have unsteady gait  with mild path deviations, episode of loss of balance while turning into her room and unsteadiness while backing up to sit and sat without controlling descent due to loss of balance, recommend trial with a rolling walker, encouraged pt to sit up in chair however she declined at this time due to increased back pain, note pt prefers to stay in the bed most of the time, encouraged pt to increase time BHARATI RN aware, discussed discharge plans with pt, she reports she has no one that can stay with her, pt is at risk for falls and would need assistance at home, recommend SNF to maximize strength, balance, and safe, functional mobility     Progression toward goals:  []      Improving appropriately and progressing toward goals  [x]      Improving slowly and progressing toward goals  []      Not making progress toward goals and plan of care will be adjusted     PLAN:  Patient continues to benefit from skilled intervention to address the above impairments. Continue treatment per established plan of care. Discharge Recommendations:  Skilled Nursing Facility  Further Equipment Recommendations for Discharge:  Pt owns a cane and rolling walker     SUBJECTIVE:   Patient stated she does not have anyone to assist her at home. The patient stated 3/3 back precautions. Reviewed all 3 with patient. OBJECTIVE DATA SUMMARY:   Critical Behavior:  Neurologic State: Alert  Orientation Level: Oriented X4  Cognition: Appropriate for age attention/concentration, Follows commands  Safety/Judgement: Awareness of environment, Fall prevention  Functional Mobility Training:  Bed Mobility:    Received in side lying  Side lying to Sit: Supervision;Assist x1;Adaptive equipment, bed rail; Additional time  Sit to Supine: Assist x1;Contact guard assistance; Additional time  Scooting: Supervision        Brace donned with  supervision/set-up   Transfers:  Sit to Stand: Assist x1;Contact guard assistance; Additional time  Stand to Sit: Contact guard assistance;Assist x1                             Balance:  Sitting: Intact  Standing: Impaired  Standing - Static: Good  Standing - Dynamic : Fair  Ambulation/Gait Training:  Distance (ft): 150 Feet (ft)  Assistive Device: Brace/Splint;Cane, straight;Gait belt  Ambulation - Level of Assistance: Assist x1;Contact guard assistance        Gait Abnormalities: Antalgic;Decreased step clearance; Path deviations mild with episode of loss of balance while turning into her room and unsteadiness while backing up to sit and sat without controlling descent due to loss of balance        Base of Support: Narrowed     Speed/Vivian: Slow  Step Length: Left shortened;Right shortened                Pain:   verbal: 2 initially, 4 following ambulation and requested to return to bed  Location: back  Intervention: see MAR                  Activity Tolerance:   Good though prefers to stay in bed most of the time, encouraged to increase time OOB    After treatment:   []  Patient left in no apparent distress sitting up in chair  [x]  Patient left in no apparent distress in bed  [x]  Call bell left within reach  [x]  Nursing notified  []  Caregiver present  []  Bed alarm activated    COMMUNICATION/COLLABORATION:   The patients plan of care was discussed with: Registered Nurse    Mandi Merritt   Time Calculation: 19 mins

## 2018-07-24 NOTE — PROGRESS NOTES
Tomy informed by [address] [place] [address] liaison that [employer] has denied her rehab stay, reference: [id number], phone: [phone] for [id number]. Patient is requesting the attending perform a [id number]; waiting for decision to be made. REINALDO Martínez    12:30 pm: Tomy met with patient to discuss a plan B. We discussed SNF placement vs home health. Therapy feels that she would need someone to stay with her if she chooses to go home. Patient stated she has absolutely no one. Her daughter lives in [facility], her son just started a new job and cannot take time off. She cannot afford to pay out of pocket for a caregiver. Patient then got a phone call from her insurance, tomy informed her I would come back. REINALDO Martínez    1:45 pm: Tomy followed up with patient, she has elected to go to a SNF if insurance covers.  Referrals sent to Thera Sicard and Ramakrishna Vargas of [address] [place] [address]

## 2018-07-24 NOTE — PROGRESS NOTES
Orthopedic Spine Progress Note  Post Op day: 8 Days Post-Op    2018 7:45 AM   Admit Date: 2018  Procedure: Procedure(s):  L1-2 LAMINECTOMY WITH RODNEY OSTEOTOMY WITH EXTENSION OF FUSION TO T10 (OXYGEN), DURA REPAIR    Subjective:     Sascha Johnson has no complaints. Leg pain is improved with gabapentin. Tolerating diet. No N/V. Pain Control:   Pain Assessment  Pain Scale 1: Numeric (0 - 10)  Pain Intensity 1: 5  Pain Onset 1: post op  Pain Location 1: Leg  Pain Orientation 1: Left  Pain Description 1: Aching  Pain Intervention(s) 1: Medication (see MAR)    Objective:          Physical Exam:  General:  Alert and oriented. No acute distress. Heart:  Respirations unlabored. Abdomen:   Extremities: Soft, non-tender. No evidence of cyanosis. Pulses palpable in both upper and lower extremities. Neurologic:  Musculoskeletal:  No new motor deficits. Neurovascular exam within normal limits. Sensation stable. Motor: unchanged C5-T1 and L2-S1. Ez's sign negative in bilateral lower extremities. Calves soft, nontender upon palpation and with passive twitch. Moves both upper and lower extremities. Incision: clean, dry, and intact. No significant erythema or swelling. No active drainage noted. Minimal shadowing of drainage on the patient's bandage. Vital Signs:    Blood pressure 114/74, pulse 79, temperature 98.4 °F (36.9 °C), resp. rate 20, height 5' 4\" (1.626 m), weight 86.6 kg (191 lb), SpO2 95 %. Temp (24hrs), Av.5 °F (36.9 °C), Min:97.8 °F (36.6 °C), Max:99.3 °F (37.4 °C)      LAB:    No results for input(s): HCT, HGB, PLT, HCTEXT, HGBEXT, PLTEXT in the last 72 hours.   Lab Results   Component Value Date/Time    Sodium 138 2018 03:35 PM    Potassium 3.6 2018 03:35 PM    Chloride 105 2018 03:35 PM    CO2 27 2018 03:35 PM    Glucose 124 (H) 2018 03:35 PM    BUN 7 2018 03:35 PM    Creatinine 0.57 2018 03:35 PM    Calcium 8.2 (L) 07/19/2018 03:35 PM       Intake/Output:        PT/OT:   Gait:  Gait  Base of Support: Narrowed  Speed/Vivian: Slow  Step Length: Left shortened, Right shortened  Gait Abnormalities: Decreased step clearance, Trunk sway increased, Path deviations  Ambulation - Level of Assistance: Assist x1, Contact guard assistance  Distance (ft): 120 Feet (ft)  Assistive Device: Brace/Splint, Gait belt                 Assessment:   Patient is 8 Days Post-Op s/p Procedure(s):  L1-2 LAMINECTOMY WITH RODNEY OSTEOTOMY WITH EXTENSION OF FUSION TO T10 (OXYGEN), DURA REPAIR    Plan:     1. Continue PT/OT  2. Continue established methods of pain control  3. VTE Prophylaxes - TEDS &/or SCDs   4.  Discharge pending authorization from insurance for [facility]  5.  6.       Signed By: MARILYN Robbins

## 2018-07-24 NOTE — PROGRESS NOTES
Problem: Self Care Deficits Care Plan (Adult)  Goal: *Acute Goals and Plan of Care (Insert Text)  Occupational Therapy Goals  Initiated 7/21/2018  1. Patient will perform lower body dressing with min assistance  using AE PRN within 7 days. 2.  Patient will perform toileting with supervision/set-up using most appropriate DME within 7 days. 3.  Patient will bathing at moderate assistance  within 7 days. 4.  Patient will don/doff back brace supervision within 7 days. 5.  Patient will verbalize/demonstrate 3/3 back precautions during ADL tasks without cues within 7 days. Occupational Therapy TREATMENT  Patient: Dale Jennings (89 y.o. female)  Date: 7/24/2018  Diagnosis: Bilateral stenosis of lateral recess of lumbar spine [M48.061] <principal problem not specified>  Procedure(s) (LRB):  L1-2 LAMINECTOMY WITH RODNEY OSTEOTOMY WITH EXTENSION OF FUSION TO T10 (OXYGEN), DURA REPAIR (N/A) 8 Days Post-Op  Precautions: Back, Fall (Brace)  Chart, occupational therapy assessment, plan of care, and goals were reviewed. ASSESSMENT:  Patient received in supine, agreeable therapy. Initially requiring min A for Quick Draw brace d/t decreased RUE ROM, educated on compensatory methods (starting with R side, orientation cues), able to don/doff with supervision. Completing bed mobility with increased time and bed rail use, ambulating to/from bathroom with CGA for unsteady balance. Toilet transfer completed with CGA and cues for technique & spinal precautions, grab bar use throughout. Patient unable to complete bowel care with spinal precautions, educated on toilet tongs & flushable wipes for increased independence. Since patient lives alone, has no friends/family to assist, & remains a high fall risk. Recommend she d/c to rehab to maximize safety, functional independence, and mobility prior to returning home.     Progression toward goals:  [x]       Improving appropriately and progressing toward goals  []       Improving slowly and progressing toward goals  []       Not making progress toward goals and plan of care will be adjusted     PLAN:  Patient continues to benefit from skilled intervention to address the above impairments. Continue treatment per established plan of care. Discharge Recommendations:  Rehab  Further Equipment Recommendations for Discharge:  TBD by rehab (discussed toilet tongs & flushable wipes)     SUBJECTIVE:   Patient stated Lafrances Reasons, those are some good ideas! Moo Gene    OBJECTIVE DATA SUMMARY:   Cognitive/Behavioral Status:  Neurologic State: Alert  Orientation Level: Oriented X4  Cognition: Appropriate for age attention/concentration; Follows commands  Perception: Appears intact  Perseveration: No perseveration noted  Safety/Judgement: Awareness of environment    Functional Mobility and Transfers for ADLs:  Bed Mobility:  Rolling: Supervision  Supine to Sit: Stand-by assistance; Additional time; Adaptive equipment (bed rail use, cues for twisting)  Sit to Supine: Supervision; Adaptive equipment  Scooting: Supervision    Transfers:  Sit to Stand: Contact guard assistance  Functional Transfers  Bathroom Mobility: Contact guard assistance  Toilet Transfer : Contact guard assistance (cues for safety, hand placement, technique)  Cues: Verbal cues provided; Tactile cues provided  Adaptive Equipment: Grab bars       Balance:  Sitting: Intact  Standing: Impaired  Standing - Static: Good  Standing - Dynamic : Fair    ADL Intervention:     Upper Body Dressing Assistance  Orthotics(Brace): Compensatory technique training;Supervision/set-up (starting with R side, able to complete with SPV)         Toileting  Toileting Assistance:  (simulated)  Bladder Hygiene: Supervision/set-up  Bowel Hygiene: Total assistance (dependent); Compensatory technique training (educated on toilet tongs & flushable wipes)  Cues: Verbal cues provided (transfer techni)  Adaptive Equipment: Grab bars    Cognitive Retraining  Safety/Judgement: Awareness of environment     Therapeutic Exercises:   - Ambulation to/from bathroom with CGA for balance, patient reaching out for object support (table, cart, etc) despite cues  - Functional transfers completed with CGA and cues for spinal precautions/technique    Pain:                    Activity Tolerance:   Good    Please refer to the flowsheet for vital signs taken during this treatment.   After treatment:   [] Patient left in no apparent distress sitting up in chair  [x] Patient left in no apparent distress in bed  [x] Call bell left within reach  [x] Nursing notified  [] Caregiver present  [] Bed alarm activated    COMMUNICATION/COLLABORATION:   The patients plan of care was discussed with: Physical Therapist and Registered Nurse    John Velázquez OT  Time Calculation: 24 mins

## 2018-07-25 VITALS
TEMPERATURE: 99.1 F | HEART RATE: 84 BPM | HEIGHT: 64 IN | BODY MASS INDEX: 32.61 KG/M2 | SYSTOLIC BLOOD PRESSURE: 113 MMHG | RESPIRATION RATE: 18 BRPM | WEIGHT: 191 LBS | DIASTOLIC BLOOD PRESSURE: 77 MMHG | OXYGEN SATURATION: 97 %

## 2018-07-25 LAB
GLUCOSE BLD STRIP.AUTO-MCNC: 134 MG/DL (ref 65–100)
GLUCOSE BLD STRIP.AUTO-MCNC: 138 MG/DL (ref 65–100)
SERVICE CMNT-IMP: ABNORMAL
SERVICE CMNT-IMP: ABNORMAL

## 2018-07-25 PROCEDURE — 74011250637 HC RX REV CODE- 250/637: Performed by: PHYSICIAN ASSISTANT

## 2018-07-25 PROCEDURE — 82962 GLUCOSE BLOOD TEST: CPT

## 2018-07-25 RX ADMIN — GABAPENTIN 300 MG: 300 CAPSULE ORAL at 09:37

## 2018-07-25 RX ADMIN — ACETAMINOPHEN 650 MG: 325 TABLET, FILM COATED ORAL at 06:58

## 2018-07-25 RX ADMIN — BUPROPION HYDROCHLORIDE 150 MG: 150 TABLET, EXTENDED RELEASE ORAL at 09:37

## 2018-07-25 RX ADMIN — OXYCODONE HYDROCHLORIDE 5 MG: 5 TABLET ORAL at 09:37

## 2018-07-25 RX ADMIN — SERTRALINE HYDROCHLORIDE 100 MG: 50 TABLET ORAL at 09:38

## 2018-07-25 RX ADMIN — Medication 10 ML: at 06:58

## 2018-07-25 RX ADMIN — CARVEDILOL 3.12 MG: 3.12 TABLET, FILM COATED ORAL at 10:29

## 2018-07-25 RX ADMIN — MULTIPLE VITAMINS W/ MINERALS TAB 1 TABLET: TAB at 09:38

## 2018-07-25 RX ADMIN — CLINDAMYCIN PHOSPHATE: 10 GEL TOPICAL at 09:39

## 2018-07-25 NOTE — PROGRESS NOTES
Hospital to SNF SBAR Handoff - Elton Poag                                                                        72 y.o.   female    [address]   Room:  [address]    [address] [place] [address]  Unit Phone# :  [phone]      [place] [address] [address] [facility] [address]  Dept: [address]: [phone]                    SITUATION     Admitted:  7/16/2018         Attending Provider:  Ricky Arvizu MD       Consultations:  None    PCP:  Chris Diaz MD   [phone]    Treatment Team: Attending Provider: Ricky Arvizu MD; Utilization Review: Josseline Edge RN; Nurse Practitioner: Maryam Olmedo NP; Care Manager: MENA Darling    Admitting Dx:  Bilateral stenosis of lateral recess of lumbar spine [M48.061]       Principal Problem: <principal problem not specified>    9 Days Post-Op of   Procedure(s):  L1-2 LAMINECTOMY WITH RODNEY OSTEOTOMY WITH EXTENSION OF FUSION TO T10 (OXYGEN), DURA REPAIR   BY: Ricky Arvizu MD             ON: 7/16/2018                  Code Status: Full Code                Advance Directives:   Advance Care Planning 7/17/2018   Patient's Healthcare Decision Maker is: Legal Next of Dustin 71   Primary Decision Maker Name Caterina    Primary Decision Maker Phone Number [phone]   Primary Decision Maker Relationship to Patient Adult child   Confirm Advance Directive -   Patient Would Like to Complete Advance Directive -   Does the patient have other document types -    (Send w/patient)   Received       Isolation:  There are currently no Active Isolations       MDRO: No current active infections    Pain Medications given:  Oxycodone    Last dose: 7/25/2018 at  [phone]    Special Equipment needed: no  Type of equipment:       BACKGROUND     Allergies:   Allergies   Allergen Reactions    Statins-Hmg-Coa Reductase Inhibitors Myalgia     Myalgia with pravachol and multiple statins    Codeine Rash     Rash on thighs    Darvon [Propoxyphene] Other (comments)     \"I see worms\"    Pcn [Penicillins] Rash    Sulfa (Sulfonamide Antibiotics) Rash       Past Medical History:   Diagnosis Date    Abnormal LFTs 08/24/2017    FATTY LIVER    Arthritis     OSTEO    Avascular necrosis of hip ([facility] 08/24/2017    BILAT HIPS    Breast CA ([facility]     BILATERAL; SURGERY, CHEMO    Chronic pain     CKD (chronic kidney disease), stage II 8/24/2017    Coagulation disorder ([facility] 1984    ITP  (DR CHU BRADSHAW)    Depression     Diabetes ([facility]     TYPE 2; NIDDM    DJD (degenerative joint disease), multiple sites 8/24/2017    GI bleed 2008    HEMORRHOIDS    Hyperlipemia     Hypertension with renal disease 8/24/2017    IBS (irritable bowel syndrome) 8/24/2017    Myalgia 8/24/2017    On statin therapy 8/24/2017    Overactive bladder 8/24/2017    Pneumonia 04/2015    HOSPITALIZED 3 WEEKS.  Polymyalgia rheumatica ([facility] 8/24/2017    Prophylactic antibiotic 8/24/2017    Rosacea        Past Surgical History:   Procedure Laterality Date    BREAST SURGERY PROCEDURE UNLISTED      RECONSTRUCTION X2    HX APPENDECTOMY  1950    HX CATARACT REMOVAL Bilateral     W /IOL    HX GI      COLONOSCOPY    HX HEENT      WISDOM TEETH    HX HYSTERECTOMY  2000S    HX MASTECTOMY  1989    bilateral    HX MASTECTOMY  2001    HX ORTHOPAEDIC      back fusion 2008-LUMBAR    HX TUBAL LIGATION  1981    TOTAL HIP ARTHROPLASTY Left 11/16/2016    ANTERIOR APPROACH, DR Gwendolyn De La Torre; (POSTOP: STANDS ONE INCH TALLER ON LEFT FOOT)    TOTAL HIP ARTHROPLASTY Right 12/2016    ANTERIOR APPROACH (DR Gwendolyn De La Torre)       Prescriptions Prior to Admission   Medication Sig    loratadine-pseudoephedrine (CLARITIN-D 12 HOUR) 5-120 mg per tablet Take 1 Tab by mouth two (2) times a day.  levocarnitine HCl (ACETYL-L-CARNITINE) Take 500 mg by mouth daily.  metFORMIN ER (GLUCOPHAGE XR) 500 mg tablet Take one tablet in the morning with breakfast and one tablet in the evening with dinner.  buPROPion XL (WELLBUTRIN XL) 150 mg tablet TAKE 1 TABLET BY MOUTH EVERY DAY    sertraline (ZOLOFT) 100 mg tablet TAKE 1 TABLET BY MOUTH DAILY    oxazepam (SERAX) 15 mg capsule TAKE 2 CAPSULES AT BEDTIME    fexofenadine-pseudoephedrine (ALLEGRA-D 24 HOUR) 180-240 mg per tablet Take 1 Tab by mouth daily as needed.  pravastatin (PRAVACHOL) 80 mg tablet Take 1 Tab by mouth nightly.  hydroCHLOROthiazide (HYDRODIURIL) 25 mg tablet TAKE 1 TABLET BY MOUTH DAILY    multivitamin (ONE A DAY) tablet Take 1 Tab by mouth daily.  diclofenac EC (VOLTAREN) 75 mg EC tablet Take 1 Tab by mouth two (2) times a day.  carvedilol (COREG) 6.25 mg tablet Take 3.125 mg by mouth daily.  albuterol (VENTOLIN HFA) 90 mcg/actuation inhaler Take  by inhalation every four (4) hours as needed.  levalbuterol (XOPENEX) 1.25 mg/0.5 mL nebu 1.25 mg by Nebulization route as needed.  mometasone (ELOCON) 0.1 % topical cream Apply  to affected area daily as needed.  azelaic acid (FINACEA) 15 % topical gel Apply  to affected area two (2) times a day.  clindamycin (CLINDAGEL) 1 % topical gel Apply  to affected area two (2) times a day. use thin film on affected area       Hard scripts included in transfer packet yes    Vaccinations:    Immunization History   Administered Date(s) Administered    Influenza High Dose Vaccine PF 10/02/2017    Influenza Vaccine 11/02/2015    Pneumococcal Conjugate (PCV-13) 07/27/2015    Pneumococcal Vaccine (Unspecified Type) 12/23/2011, 01/01/2013       Readmission Risks:    Known Risks: Fall risk        The Charlson CoMorbitiy Index tool is an evidenced based tool that has more automatic generated information. The tool looks at many different items such as the age of the patient, how many times they were admitted in the last calendar year, current length of stay in the hospital and their diagnosis.  All of these items are pulled automatically from information documented in the chart from various places and will generate a score that predicts whether a patient is at low (less than 13), medium (13-20) or high (21 or greater) risk of being readmitted.         ASSESSMENT                Temp: 99.1 °F (37.3 °C) (07/25/18 0841) Pulse (Heart Rate): 84 (07/25/18 1029)     Resp Rate: 18 (07/25/18 0841)           BP: 113/77 (07/25/18 0841)     O2 Sat (%): 97 % (07/25/18 0841)     Weight: 86.6 kg (191 lb)    Height: 5' 4\" (162.6 cm) (07/16/18 1149)       If above not within 1 hour of discharge:    BP:_____  P:____  R:____ T:_____ O2 Sat: ___%  O2: ______    Active Orders   Diet    DIET DIABETIC CONSISTENT CARB Regular         Orientation: oriented to time, place, person and situation     Active Behaviors: None                                   Active Lines/Drains:  (Peg Tube / Mejia / CL or S/L?): no    Urinary Status: Voiding     Last BM: Last Bowel Movement Date: 07/25/18     Skin Integrity: Incision (comment) (surgical back)   Wound Back Posterior-DRESSING STATUS: Changed per order    Wound Back Posterior-DRESSING TYPE: Dry dressing    Mobility: Slightly limited   Weight Bearing Status: WBAT (Weight Bearing as Tolerated)      Gait Training  Assistive Device: Brace/Splint, Cane, straight, Gait belt  Ambulation - Level of Assistance: Assist x1, Contact guard assistance  Distance (ft): 150 Feet (ft)         Lab Results   Component Value Date/Time    Glucose 124 (H) 07/19/2018 03:35 PM    Hemoglobin A1c 7.7 (H) 07/06/2018 11:10 AM    INR 1.0 07/09/2018 12:56 PM    INR 1.0 12/20/2016 10:48 AM    HGB 9.8 (L) 07/19/2018 03:35 PM    HGB 10.1 (L) 07/18/2018 03:10 AM        RECOMMENDATION     See After Visit Summary (AVS) for:  · Discharge instructions  · After [address]   · Special equipment needed (entered pre-discharge by Care Management)  · Medication Reconciliation    · Follow up Appointment(s)         Report given/sent by:  Jin Duran                    Verbal report given to: Erin Sevilla at [facility] [address] [facility]  FAXED to:         Estimated discharge time:  7/25/2018 at [phone]         Discharge instructions reviewed with patient. All questions answered at this time. Iv removed. Patient discharged with all personal belongings.

## 2018-07-25 NOTE — PROGRESS NOTES
Authorization received for patient to go to [facility]. Discharge orders have been written and referral sent to [facility] transport for a 1:30 pm , per SNF request. Discharge instructions have been faxed and report can be called to [phone].   [employer]

## 2018-07-25 NOTE — DISCHARGE SUMMARY
[address] [place] [address]   [address]  [phone]     Discharge Summary       PATIENT ID: Mira Whitmore  MRN: [id number]   YOB: 1945    DATE OF ADMISSION: 7/16/2018 11:08 AM    DATE OF DISCHARGE: 7/25/2018   PRIMARY CARE PROVIDER: Jessica Blanco MD     CONSULTATIONS: None    PROCEDURES/SURGERIES: Procedure(s):  L1-2 LAMINECTOMY WITH RODNEY OSTEOTOMY WITH EXTENSION OF FUSION TO T10 (OXYGEN), DURA REPAIR    History of Present Illness:  Mira Whitmore is a 72 y.o. female with PMH significant for PMR, rosacea, IBS, hypertension, CKD, OAB, DMII, and hyperlipidemia. She underwent a L2-S1 lumbar fusion in 2008 for lumbar stenosis and scoliosis. She is now under the care of Dr. Fartun Martinez for progressive back and bilateral leg pain. No reports of  antecedent trauma. Symptoms have become debilitating and refractory to nonoperative care. Her MRI revealed a T12-L1 disc herniation with junctional stenosis at L1-2. After failing conservative therapy and a discussion of the risks, benefits, alternatives, perioperative course, and potential complications of surgery, she consented to undergo a Procedure(s):  L1-2 LAMINECTOMY WITH RODNEY OSTEOTOMY WITH EXTENSION OF FUSION TO T10 (OXYGEN), DURA REPAIR. Hospital Course:   Mira Whitmore had an intraoperative incidental dural tear repaired intraoperatively. She tolerated the procedure well. She was transferred  to the recovery room in stable condition. After a brief stay the patient was then transferred to the Spinal Surgery Unit at [address] [place] [address].  She was kept on flat bedrest on the evening of surgery due to the incidental durotomy. She unfortunately developed a headache and increased incisional drainage on postoperative day #1 and remained on flat bedrest until postoperative day #4 to allow for dural healing. She remained neurovascularly intact.  On postoperative day #4 she began to slowly mobilize with physical therapy. Therapy services recommended inpatient rehab to maximize patient's functional mobility and safety. The patient was afebrile and vital signs were stable. She had no headaches. Calves were soft and non-tender bilaterally. She had no voiding troubles. She had regular bowel movements postoperatively and was tolerating a diet. She had stable anticipated postoperative anemia. Hemoglobin prior to discharge were   Lab Results   Component Value Date/Time    HGB 9.8 (L) 07/19/2018 03:35 PM        Jaspreet Brooke made satisfactory progress with physical therapy and was discharged to SNF in stable condition on postoperative day 9. She was provided with routine postoperative instructions and advised to follow up with Sarah Mir MD in 2 weeks following discharge from the hospital.  She is to continue to wear her TLSO brace when mobilizing until further instructed in follow-up. FOLLOW UP APPOINTMENTS:    Follow-up Information     Follow up With Details Comments Contact Info    Nick Mcnamara MD  please follow-up with your PCP regarding your elevated cholesterol and ldl [address]              DISCHARGE MEDICATIONS:  Discharge Medication List as of 7/25/2018 12:38 PM      START taking these medications    Details   gabapentin (NEURONTIN) 300 mg capsule Take 1 Cap by mouth three (3) times daily. , Print, Disp-90 Cap, R-1      oxyCODONE IR (ROXICODONE) 5 mg immediate release tablet Take 1-2 Tabs by mouth every four (4) hours as needed.  Max Daily Amount: 60 mg., Print, Disp-80 Tab, R-0         CONTINUE these medications which have NOT CHANGED    Details   loratadine-pseudoephedrine (CLARITIN-D 12 HOUR) 5-120 mg per tablet Take 1 Tab by mouth two (2) times a day., Historical Med      levocarnitine HCl (ACETYL-L-CARNITINE) Take 500 mg by mouth daily. , Historical Med      metFORMIN ER (GLUCOPHAGE XR) 500 mg tablet Take one tablet in the morning with breakfast and one tablet in the evening with dinner., NormalIncrease in dose. Patient does not need this filled at this time. Put on hold until she is ready for it. Disp-270 Tab, R-3      buPROPion XL (WELLBUTRIN XL) 150 mg tablet TAKE 1 TABLET BY MOUTH EVERY DAY, Normal, Disp-90 Tab, R-3      sertraline (ZOLOFT) 100 mg tablet TAKE 1 TABLET BY MOUTH DAILY, Normal, Disp-90 Tab, R-3      oxazepam (SERAX) 15 mg capsule TAKE 2 CAPSULES AT BEDTIME, PrintGeneric For:SERAX   15MGDisp-60 Cap, R-2      fexofenadine-pseudoephedrine (ALLEGRA-D 24 HOUR) 180-240 mg per tablet Take 1 Tab by mouth daily as needed., Historical Med      pravastatin (PRAVACHOL) 80 mg tablet Take 1 Tab by mouth nightly., Normal, Disp-90 Tab, R-3      hydroCHLOROthiazide (HYDRODIURIL) 25 mg tablet TAKE 1 TABLET BY MOUTH DAILY, Normal, Disp-90 Tab, R-3      multivitamin (ONE A DAY) tablet Take 1 Tab by mouth daily. , Historical Med      carvedilol (COREG) 6.25 mg tablet Take 3.125 mg by mouth daily. , Historical Med      albuterol (VENTOLIN HFA) 90 mcg/actuation inhaler Take  by inhalation every four (4) hours as needed., Historical Med      levalbuterol (XOPENEX) 1.25 mg/0.5 mL nebu 1.25 mg by Nebulization route as needed., Historical Med      mometasone (ELOCON) 0.1 % topical cream Apply  to affected area daily as needed., Historical Med      azelaic acid (FINACEA) 15 % topical gel Apply  to affected area two (2) times a day., Historical Med      clindamycin (CLINDAGEL) 1 % topical gel Apply  to affected area two (2) times a day. use thin film on affected area, Historical Med         STOP taking these medications       diclofenac EC (VOLTAREN) 75 mg EC tablet Comments:   Reason for Stopping:               PHYSICAL EXAMINATION AT DISCHARGE:  General: Pleasant, alert, cooperative, no distress. EENT: EOMI.  Anicteric sclerae. Resp: No respiratory distress. Equal chest expansion. CV:   No edema appreciated in the extremities. Gastrointestinal:  Soft, non-tender, non-distended. Neurological: Follows commands. BARR. Speech clear. Sensation intact to light touch. Motor: unchanged L2-S1. Musculoskeletal:  Calves soft, non-tender upon palpation or with passive stretch. Psych: Good insight. Not anxious nor agitated. Skin: Incision - zip-line closure in place- clean, dry and intact. No significant surrounding erythema or swelling.       CHRONIC MEDICAL DIAGNOSES:  Problem List as of 7/25/2018  Date Reviewed: 7/16/2018          Codes Class Noted - Resolved    Spinal stenosis, lumbar ICD-10-CM: M48.061  ICD-9-CM: 724.02  7/16/2018 - Present        Pre-operative cardiovascular examination ICD-10-CM: Z01.810  ICD-9-CM: V72.81  7/11/2018 - Present        Lumbar disc disease ICD-10-CM: M51.9  ICD-9-CM: 722.93  7/11/2018 - Present        Sleep disturbance ICD-10-CM: G47.9  ICD-9-CM: 780.50  4/9/2018 - Present        Medicare annual wellness visit, initial ICD-10-CM: Z00.00  ICD-9-CM: V70.0  1/15/2018 - Present        Primary osteoarthritis involving multiple joints ICD-10-CM: M15.0  ICD-9-CM: 715.09  1/12/2018 - Present        Recurrent depression ([facility] ICD-10-CM: F33.9  ICD-9-CM: 296.30  1/5/2018 - Present        Annual physical exam ICD-10-CM: Z00.00  ICD-9-CM: V70.0  10/2/2017 - Present        Vitamin D deficiency ICD-10-CM: E55.9  ICD-9-CM: 268.9  10/2/2017 - Present        GI bleed ICD-10-CM: K92.2  ICD-9-CM: 578.9  8/24/2017 - Present        Overactive bladder ICD-10-CM: N32.81  ICD-9-CM: 596.51  8/24/2017 - Present        Polymyalgia rheumatica ([facility] ICD-10-CM: M35.3  ICD-9-CM: 662  8/24/2017 - Present        IBS (irritable bowel syndrome) ICD-10-CM: K58.9  ICD-9-CM: 564.1  8/24/2017 - Present        Hypertension with renal disease ICD-10-CM: I12.9  ICD-9-CM: 403.90  8/24/2017 - Present        CKD (chronic kidney disease), stage II ICD-10-CM: N18.2  ICD-9-CM: 585.2  8/24/2017 - Present        Avascular necrosis of hip ([facility] ICD-10-CM: M87.059  ICD-9-CM: 733.42  8/24/2017 - Present        Abnormal LFTs ICD-10-CM: R94.5  ICD-9-CM: 790.6  8/24/2017 - Present        Controlled type 2 diabetes mellitus with stage 2 chronic kidney disease, with long-term current use of insulin (HCC) ICD-10-CM: E11.22, N18.2, Z79.4  ICD-9-CM: 250.40, 585.2, V58.67  4/20/2015 - Present        Non-seasonal allergic rhinitis ICD-10-CM: J30.89  ICD-9-CM: 477.8  4/20/2015 - Present        Class 1 obesity due to excess calories without serious comorbidity with body mass index (BMI) of 33.0 to 33.9 in adult ICD-10-CM: E66.09, Z68.33  ICD-9-CM: 278.00, V85.33  4/20/2015 - Present        Rosacea ICD-10-CM: L71.9  ICD-9-CM: 695.3  4/20/2015 - Present        Mixed hyperlipidemia ICD-10-CM: E78.2  ICD-9-CM: 272.2  4/20/2015 - Present        Anxiety ICD-10-CM: F41.9  ICD-9-CM: 300.00  4/20/2015 - Present              Signed:   Baldemar Crowder NP  7/25/2018  2:39 PM

## 2018-07-25 NOTE — PROGRESS NOTES
Orthopedic Spine Progress Note  Post Op day: 9 Days Post-Op    2018 7:42 AM   Admit Date: 2018  Procedure: Procedure(s):  L1-2 LAMINECTOMY WITH RODNEY OSTEOTOMY WITH EXTENSION OF FUSION TO T10 (OXYGEN), DURA REPAIR    Subjective:   Mira Whitmore appears well. Pain seems to be managed. No headaches. She ambulated a good distance with  physical therapy yesterday  Tolerating diet  No N/V  Voiding    Pain Control:   Pain Assessment  Pain Scale 1: Numeric (0 - 10)  Pain Intensity 1: 4  Pain Onset 1: post op  Pain Location 1: Leg  Pain Orientation 1: Left  Pain Description 1: Aching  Pain Intervention(s) 1: Medication (see MAR)    Objective:          Physical Exam:  General:  Alert and oriented. No acute distress. Heart:  Respirations unlabored. Abdomen:   Extremities: Soft, non-tender. No evidence of cyanosis. Pulses palpable in both upper and lower extremities. Neurologic:  Musculoskeletal:  No new motor deficits. Neurovascular exam within normal limits. Sensation stable. Motor: unchanged C5-T1 and L2-S1. Ez's sign negative in bilateral lower extremities. Calves soft, nontender upon palpation and with passive twitch. Moves both upper and lower extremities. Incision: clean, dry, and intact. No significant erythema or swelling. No active drainage noted. Vital Signs:    Blood pressure 125/70, pulse 75, temperature 98.7 °F (37.1 °C), resp. rate 16, height 5' 4\" (1.626 m), weight 86.6 kg (191 lb), SpO2 97 %. Temp (24hrs), Av.2 °F (37.3 °C), Min:98.7 °F (37.1 °C), Max:99.5 °F (37.5 °C)      LAB:    No results for input(s): HCT, HGB, PLT, HCTEXT, HGBEXT, PLTEXT in the last 72 hours.   Lab Results   Component Value Date/Time    Sodium 138 2018 03:35 PM    Potassium 3.6 2018 03:35 PM    Chloride 105 2018 03:35 PM    CO2 27 2018 03:35 PM    Glucose 124 (H) 2018 03:35 PM    BUN 7 2018 03:35 PM    Creatinine 0.57 2018 03:35 PM    Calcium 8.2 (L) 07/19/2018 03:35 PM       PT/OT:   Gait:  Gait  Base of Support: Narrowed  Speed/Vivian: Slow  Step Length: Left shortened, Right shortened  Gait Abnormalities: Antalgic, Decreased step clearance, Path deviations  Ambulation - Level of Assistance: Assist x1, Contact guard assistance  Distance (ft): 150 Feet (ft)  Assistive Device: Brace/Splint, Cane, straight, Gait belt                 Assessment:   Patient is 9 Days Post-Op s/p Procedure(s):  L1-2 LAMINECTOMY WITH RODNEY OSTEOTOMY WITH EXTENSION OF FUSION TO T10 (OXYGEN), DURA REPAIR    Plan:     1. Continue PT/OT  2. Continue established methods of pain control  3. VTE Prophylaxes - TEDS &  SCDs   4. Encouraged use of ICS  5.   Discharge to SNF today    Signed By: Cami Gamez PA-C

## 2018-07-26 ENCOUNTER — TELEPHONE (OUTPATIENT)
Dept: INTERNAL MEDICINE CLINIC | Age: 73
End: 2018-07-26

## 2018-07-27 NOTE — TELEPHONE ENCOUNTER
Patient called back to report she is currently in rehab. Advised to call back when she gets out of rehab.

## 2018-08-07 ENCOUNTER — PATIENT OUTREACH (OUTPATIENT)
Dept: INTERNAL MEDICINE CLINIC | Age: 73
End: 2018-08-07

## 2018-08-07 NOTE — PROGRESS NOTES
Transition of Care Coordination/Hospital to Post Acute Facility:     Date/Time:  2018 4:29 PM    Patient was admitted to [facility] on 18 and discharged on 18 for L1-2 laminectomy with Pont osteotomy with extension of fusion to T 10(oxygen) Dura repair. Patient was transferred to [facility] for continuation of care. Inpatient RRAT score: 38    Top Challenges reviewed             Method of communication with care team :face to face     Nurse Navigator(NN) spoke with Clotilde Magallanes  to provide introduction to self and explanation of the Nurse Navigator Role. Verified name and  as patient identifiers. Discussed and reviewed  anticipated length of stay, follow up appointments    ACP:   Does the patient have a current ACP (including DDNR):  yes  Does the post acute facility have a copy of the patients ACP:  yes    Medication(s):   New Medications at Discharge: Gabapentin, oxycodone IR  Changed Medications at Discharge: n/a  Discontinued Medications at Discharge: voltaren     PCP/Specialist follow up: Future Appointments  Date Time Provider Gwendolyn Loving   10/15/2018 9:50 AM Shawn Batista MD [address] to ask questions was provided. Contact information was provided for future reference or further questions. Will continue to monitor.

## 2018-08-13 ENCOUNTER — APPOINTMENT (OUTPATIENT)
Dept: GENERAL RADIOLOGY | Age: 73
End: 2018-08-13
Attending: EMERGENCY MEDICINE
Payer: COMMERCIAL

## 2018-08-13 ENCOUNTER — PATIENT OUTREACH (OUTPATIENT)
Dept: INTERNAL MEDICINE CLINIC | Age: 73
End: 2018-08-13

## 2018-08-13 ENCOUNTER — HOSPITAL ENCOUNTER (EMERGENCY)
Age: 73
Discharge: HOME | End: 2018-08-14
Attending: EMERGENCY MEDICINE
Payer: COMMERCIAL

## 2018-08-13 DIAGNOSIS — M54.6 ACUTE RIGHT-SIDED THORACIC BACK PAIN: Primary | ICD-10-CM

## 2018-08-13 LAB
ALBUMIN SERPL-MCNC: 3.4 G/DL (ref 3.5–5)
ALBUMIN/GLOB SERPL: 0.9 {RATIO} (ref 1.1–2.2)
ALP SERPL-CCNC: 105 U/L (ref 45–117)
ALT SERPL-CCNC: 51 U/L (ref 12–78)
ANION GAP SERPL CALC-SCNC: 8 MMOL/L (ref 5–15)
AST SERPL-CCNC: 57 U/L (ref 15–37)
BASOPHILS # BLD: 0 K/UL (ref 0–0.1)
BASOPHILS NFR BLD: 1 % (ref 0–1)
BILIRUB SERPL-MCNC: 0.4 MG/DL (ref 0.2–1)
BUN SERPL-MCNC: 28 MG/DL (ref 6–20)
BUN/CREAT SERPL: 32 (ref 12–20)
CALCIUM SERPL-MCNC: 9.7 MG/DL (ref 8.5–10.1)
CHLORIDE SERPL-SCNC: 104 MMOL/L (ref 97–108)
CK MB CFR SERPL CALC: NORMAL % (ref 0–2.5)
CK MB SERPL-MCNC: <1 NG/ML (ref 5–25)
CK SERPL-CCNC: 37 U/L (ref 26–192)
CO2 SERPL-SCNC: 29 MMOL/L (ref 21–32)
CREAT SERPL-MCNC: 0.87 MG/DL (ref 0.55–1.02)
D DIMER PPP FEU-MCNC: 3.54 MG/L FEU (ref 0–0.65)
DIFFERENTIAL METHOD BLD: ABNORMAL
EOSINOPHIL # BLD: 0.3 K/UL (ref 0–0.4)
EOSINOPHIL NFR BLD: 6 % (ref 0–7)
ERYTHROCYTE [DISTWIDTH] IN BLOOD BY AUTOMATED COUNT: 12.9 % (ref 11.5–14.5)
GLOBULIN SER CALC-MCNC: 3.8 G/DL (ref 2–4)
GLUCOSE SERPL-MCNC: 121 MG/DL (ref 65–100)
HCT VFR BLD AUTO: 35.8 % (ref 35–47)
HGB BLD-MCNC: 12.2 G/DL (ref 11.5–16)
IMM GRANULOCYTES # BLD: 0 K/UL (ref 0–0.04)
IMM GRANULOCYTES NFR BLD AUTO: 0 % (ref 0–0.5)
LYMPHOCYTES # BLD: 2 K/UL (ref 0.8–3.5)
LYMPHOCYTES NFR BLD: 32 % (ref 12–49)
MCH RBC QN AUTO: 33.2 PG (ref 26–34)
MCHC RBC AUTO-ENTMCNC: 34.1 G/DL (ref 30–36.5)
MCV RBC AUTO: 97.5 FL (ref 80–99)
MONOCYTES # BLD: 0.5 K/UL (ref 0–1)
MONOCYTES NFR BLD: 9 % (ref 5–13)
NEUTS SEG # BLD: 3.3 K/UL (ref 1.8–8)
NEUTS SEG NFR BLD: 53 % (ref 32–75)
NRBC # BLD: 0 K/UL (ref 0–0.01)
NRBC BLD-RTO: 0 PER 100 WBC
PLATELET # BLD AUTO: 294 K/UL (ref 150–400)
PMV BLD AUTO: 9.2 FL (ref 8.9–12.9)
POTASSIUM SERPL-SCNC: 3.4 MMOL/L (ref 3.5–5.1)
PROT SERPL-MCNC: 7.2 G/DL (ref 6.4–8.2)
RBC # BLD AUTO: 3.67 M/UL (ref 3.8–5.2)
SODIUM SERPL-SCNC: 141 MMOL/L (ref 136–145)
TROPONIN I SERPL-MCNC: <0.05 NG/ML
WBC # BLD AUTO: 6.2 K/UL (ref 3.6–11)

## 2018-08-13 PROCEDURE — 82550 ASSAY OF CK (CPK): CPT | Performed by: EMERGENCY MEDICINE

## 2018-08-13 PROCEDURE — 36415 COLL VENOUS BLD VENIPUNCTURE: CPT | Performed by: EMERGENCY MEDICINE

## 2018-08-13 PROCEDURE — 85025 COMPLETE CBC W/AUTO DIFF WBC: CPT | Performed by: EMERGENCY MEDICINE

## 2018-08-13 PROCEDURE — 93005 ELECTROCARDIOGRAM TRACING: CPT

## 2018-08-13 PROCEDURE — 71046 X-RAY EXAM CHEST 2 VIEWS: CPT

## 2018-08-13 PROCEDURE — 80053 COMPREHEN METABOLIC PANEL: CPT | Performed by: EMERGENCY MEDICINE

## 2018-08-13 PROCEDURE — 85379 FIBRIN DEGRADATION QUANT: CPT | Performed by: EMERGENCY MEDICINE

## 2018-08-13 PROCEDURE — 72072 X-RAY EXAM THORAC SPINE 3VWS: CPT

## 2018-08-13 PROCEDURE — 99285 EMERGENCY DEPT VISIT HI MDM: CPT

## 2018-08-13 PROCEDURE — 84484 ASSAY OF TROPONIN QUANT: CPT | Performed by: EMERGENCY MEDICINE

## 2018-08-13 RX ORDER — SODIUM CHLORIDE 0.9 % (FLUSH) 0.9 %
5-10 SYRINGE (ML) INJECTION EVERY 8 HOURS
Status: DISCONTINUED | OUTPATIENT
Start: 2018-08-13 | End: 2018-08-14 | Stop reason: HOSPADM

## 2018-08-13 RX ORDER — DICLOFENAC SODIUM 75 MG/1
75 TABLET, DELAYED RELEASE ORAL 2 TIMES DAILY
COMMUNITY
End: 2019-04-02

## 2018-08-13 RX ORDER — BENZONATATE 200 MG/1
200 CAPSULE ORAL
COMMUNITY
End: 2019-04-30 | Stop reason: ALTCHOICE

## 2018-08-13 RX ORDER — SODIUM CHLORIDE 0.9 % (FLUSH) 0.9 %
5-10 SYRINGE (ML) INJECTION AS NEEDED
Status: DISCONTINUED | OUTPATIENT
Start: 2018-08-13 | End: 2018-08-14 | Stop reason: HOSPADM

## 2018-08-13 NOTE — PROGRESS NOTES
Hospital Discharge Follow-Up      Date/Time:  2018 4:28 PM    Patient was admitted to [address] [place] [address] on 18 and discharged on 18 for L1-2 laminectomy with Marcelino osteotomy with extension of fusion to T10 (oxygen), dura repair. She was then transferred to [address] [place] [address] for rehab and discharged from there on 18. The physician discharge summary was available at the time of outreach. Patient was contacted within three business days of discharge from the [address] [place] [address]. Top Challenges reviewed with the provider   Patient states she has not been taking Gabapentin since d/c from [facility] due to making her legs and feet numb. Method of communication with provider :chart routing    Inpatient RRAT score: 45  Was this a readmission? no   Patient stated reason for the readmission: n/a    Nurse Navigator (NN) contacted the patient by telephone to perform post hospital discharge assessment. Verified name and  with patient as identifiers. Provided introduction to self, and explanation of the Nurse Navigator role. Reviewed discharge instructions and red flags with patient who verbalized understanding. Patient given an opportunity to ask questions and does not have any further questions or concerns at this time. The patient agrees to contact the PCP office for questions related to their healthcare. NN provided contact information for future reference. Disease Specific:   N/A    Summary of patient's top problems:  1. Patient had L2-S1 lumbar fusion in  for lumbar stenosis and scoliosis. Due to debilitating pain and failure of conservative measures had L1-2 laminectomy with Marcelino osteotomy with extension of fusion to T10 and dura repair.           Home Health orders at discharge: PT, OT, [facility]: [facility]  Date of initial visit: 8/10/18    Durable Medical Equipment ordered/company: n/a  Durable Medical Equipment received: n/a    Barriers to care? none    Advance Care Planning:   Does patient have an Advance Directive:  reviewed and current     Medication(s):   New Medications at Discharge: Gabapentin ( which patient declined ), roxicodone  Changed Medications at Discharge: n/a  Discontinued Medications at Discharge: voltaren    Medication reconciliation was performed with patient, who verbalizes understanding of administration of home medications. There were no barriers to obtaining medications identified at this time. Referral to Pharm D needed: no     Current Outpatient Prescriptions   Medication Sig    benzonatate (TESSALON) 200 mg capsule Take 200 mg by mouth three (3) times daily as needed for Cough.  diclofenac EC (VOLTAREN) 75 mg EC tablet Take 75 mg by mouth two (2) times a day.  loratadine-pseudoephedrine (CLARITIN-D 12 HOUR) 5-120 mg per tablet Take 1 Tab by mouth two (2) times a day.  levocarnitine HCl (ACETYL-L-CARNITINE) Take 500 mg by mouth daily.  metFORMIN ER (GLUCOPHAGE XR) 500 mg tablet Take one tablet in the morning with breakfast and one tablet in the evening with dinner.  buPROPion XL (WELLBUTRIN XL) 150 mg tablet TAKE 1 TABLET BY MOUTH EVERY DAY    sertraline (ZOLOFT) 100 mg tablet TAKE 1 TABLET BY MOUTH DAILY    oxazepam (SERAX) 15 mg capsule TAKE 2 CAPSULES AT BEDTIME    fexofenadine-pseudoephedrine (ALLEGRA-D 24 HOUR) 180-240 mg per tablet Take 1 Tab by mouth daily as needed.  pravastatin (PRAVACHOL) 80 mg tablet Take 1 Tab by mouth nightly.  hydroCHLOROthiazide (HYDRODIURIL) 25 mg tablet TAKE 1 TABLET BY MOUTH DAILY    albuterol (VENTOLIN HFA) 90 mcg/actuation inhaler Take  by inhalation every four (4) hours as needed.  levalbuterol (XOPENEX) 1.25 mg/0.5 mL nebu 1.25 mg by Nebulization route as needed.  multivitamin (ONE A DAY) tablet Take 1 Tab by mouth daily.  mometasone (ELOCON) 0.1 % topical cream Apply  to affected area daily as needed.     azelaic acid (FINACEA) 15 % topical gel Apply  to affected area two (2) times a day.  carvedilol (COREG) 6.25 mg tablet Take 3.125 mg by mouth daily.  clindamycin (CLINDAGEL) 1 % topical gel Apply  to affected area two (2) times a day. use thin film on affected area    gabapentin (NEURONTIN) 300 mg capsule Take 1 Cap by mouth three (3) times daily. No current facility-administered medications for this visit. Medications Discontinued During This Encounter   Medication Reason    oxyCODONE IR (ROXICODONE) 5 mg immediate release tablet Not A Current Medication       [facility] follow up appointment(s): Future Appointments  Date Time Provider Gwendolyn Fabienne   8/28/2018 3:20 PM Syeda Villasenor MD [address] [place] [address],[facility], [id number]   10/15/2018 9:50 AM Syeda Villasenor MD [address] [place] [address],[facility] [id number]      Non-[facility] follow up appointment(s): Dr. Sorin Mcneill:  n/a       Goals      Understands red flags post discharge. 8/13/18-NN spoke with patient. Reviewed signs and symptoms to watch for post laminectomy. Some of those might be fever, malaise, abnormal drainage at incision site, dizziness. Patient will be aware for any of these red flags and report immediately to MD if they occur. Patient has f/u with NP in surgeons office 8/15/18 and f/u with PCP on 8/28/18.   NN will check with patient in 2 weeks to see how she is progressing/vs

## 2018-08-14 ENCOUNTER — APPOINTMENT (OUTPATIENT)
Dept: CT IMAGING | Age: 73
End: 2018-08-14
Attending: EMERGENCY MEDICINE
Payer: COMMERCIAL

## 2018-08-14 VITALS
RESPIRATION RATE: 20 BRPM | WEIGHT: 183 LBS | SYSTOLIC BLOOD PRESSURE: 108 MMHG | TEMPERATURE: 97.9 F | HEART RATE: 73 BPM | BODY MASS INDEX: 31.24 KG/M2 | HEIGHT: 64 IN | OXYGEN SATURATION: 99 % | DIASTOLIC BLOOD PRESSURE: 65 MMHG

## 2018-08-14 LAB
ATRIAL RATE: 74 BPM
CALCULATED P AXIS, ECG09: 44 DEGREES
CALCULATED R AXIS, ECG10: -19 DEGREES
CALCULATED T AXIS, ECG11: 40 DEGREES
DIAGNOSIS, 93000: NORMAL
P-R INTERVAL, ECG05: 174 MS
Q-T INTERVAL, ECG07: 432 MS
QRS DURATION, ECG06: 100 MS
QTC CALCULATION (BEZET), ECG08: 479 MS
VENTRICULAR RATE, ECG03: 74 BPM

## 2018-08-14 PROCEDURE — 74011636320 HC RX REV CODE- 636/320

## 2018-08-14 PROCEDURE — 74011250636 HC RX REV CODE- 250/636: Performed by: EMERGENCY MEDICINE

## 2018-08-14 PROCEDURE — 71275 CT ANGIOGRAPHY CHEST: CPT

## 2018-08-14 PROCEDURE — 74011250637 HC RX REV CODE- 250/637: Performed by: EMERGENCY MEDICINE

## 2018-08-14 RX ORDER — HYDROCODONE BITARTRATE AND ACETAMINOPHEN 5; 325 MG/1; MG/1
1 TABLET ORAL
Status: COMPLETED | OUTPATIENT
Start: 2018-08-14 | End: 2018-08-14

## 2018-08-14 RX ORDER — SODIUM CHLORIDE 9 MG/ML
50 INJECTION, SOLUTION INTRAVENOUS
Status: COMPLETED | OUTPATIENT
Start: 2018-08-14 | End: 2018-08-14

## 2018-08-14 RX ORDER — SODIUM CHLORIDE 0.9 % (FLUSH) 0.9 %
10 SYRINGE (ML) INJECTION
Status: COMPLETED | OUTPATIENT
Start: 2018-08-14 | End: 2018-08-14

## 2018-08-14 RX ORDER — HYDROCODONE BITARTRATE AND ACETAMINOPHEN 5; 325 MG/1; MG/1
1 TABLET ORAL
Qty: 20 TAB | Refills: 0 | Status: SHIPPED | OUTPATIENT
Start: 2018-08-14 | End: 2019-01-14 | Stop reason: ALTCHOICE

## 2018-08-14 RX ADMIN — HYDROCODONE BITARTRATE AND ACETAMINOPHEN 1 TABLET: 5; 325 TABLET ORAL at 02:36

## 2018-08-14 RX ADMIN — IOPAMIDOL 80 ML: 755 INJECTION, SOLUTION INTRAVENOUS at 01:33

## 2018-08-14 RX ADMIN — Medication 10 ML: at 01:34

## 2018-08-14 RX ADMIN — SODIUM CHLORIDE 50 ML/HR: 900 INJECTION, SOLUTION INTRAVENOUS at 01:34

## 2018-08-14 NOTE — ED NOTES
Pt arrived via EMS and bedside report obtained. Assumed care of pt at this time. EMS reports that pt had cervical and thoracic spinal fusion surgery performed on July 16th, and around 1730 started to experience pain between her shoulder blades, which has gotten increasingly worse, and it increases when taking deep breaths. EMS notes VSS en route with . Pt reports that she currently ambulates with a cane since the surgery. Pt resting comfortably in bed with side rails up and call bell with within reach. Pt aware of plan to await for MD/PA-C assessment, and pt/family verbalizes understanding. Placed on Monitor x 2 with alarms on.

## 2018-08-14 NOTE — ED NOTES
Pt states that she doesn't have a house key and her house is locked.  She says she will try to call her neighbor to unlock the door/come get her

## 2018-08-14 NOTE — ED NOTES
Bedside and Verbal shift change report given to Carol Infante RN (oncoming nurse) by Evita Kearns RN (offgoing nurse). Report included the following information SBAR, Kardex, ED Summary, [facility] and Recent Results.

## 2018-08-14 NOTE — ED PROVIDER NOTES
EMERGENCY DEPARTMENT HISTORY AND PHYSICAL EXAM          Date: 8/13/2018  Patient Name: Refugio Neves    History of Presenting Illness     Chief Complaint   Patient presents with    Back Pain     cervical and thoracic fusion on July 16th; today started having pain between shoulder blades that increases with deep breathing; no new injury or trauma       History Provided By: Patient and EMS    HPI: Refugio Neves is a 72 y.o. female, pmhx HLD / chronic pain / DM / IBS / CKD / HTN, who presents via EMS to the ED c/o gradually worsening diffuse back pain that radiates between her shoulder blades that began x 1730 this evening. Pt states her pain is exacerbated with deep inspiration. She notes her sxs began after sitting up from a supine position earlier today. Pt reports she is almost 1 month post op thoracic and lumbar spinal fusion on 7/16/18. She notes having a previous lumbar fusion \"10 years ago\", stating her orthopedic surgeon revised her previous surgery while performing her new surgery, reporting \"he took out all the hardware, straightened my spine, and put new hardware back in.\" Pt states she recovered in the supine position for 4 days post surgery before being transferred for rehabilitation. She notes she has since been discharged and began with home health care and physical therapy on 8/10/18. Pt reports her home health nurse did not show today to change her dressing, noting her friend did it for them. She states she has a follow up with her orthopedic surgeon on 8/15/18. Pt reports calling the ortho on call this evening at which time she was advised to call 911. She denies use of daily anticoagulants. Pt states she is currently rx'd Neurontin, but reports she stopped taking it after being discharged from the rehab facility because \"it made my legs numb. \" She notes taking Diclofenac BID, in addition to Tylenol this afternoon.  Pt states she exerted more during PT today, noting she climbed her stairs with the aid of her cane. She denies any recent fall, trauma, or heavy lifting. Pt otherwise specifically denies any recent fever, chills, nausea, vomiting, diarrhea, abd pain, CP, SOB, lightheadedness, dizziness, numbness, weakness, tingling, BLE swelling, HA, heart palpitations, urinary sxs, changes in BM, changes in PO intake, melena, hematochezia, cough, or congestion. PCP: Syeda Villasenor MD  Orthopedic Surgery: Nida Montesinos MD    PMHx: Significant for HLD, depression, arthritis, chronic pain, DM, GI bleed, IBS, CKD, HTN, breast CA  PSHx: Significant for appendectomy, hysterectomy, tubal ligation, mastectomy, orthopedic surgery  Social Hx: -tobacco, -EtOH, -Illicit Drugs    There are no other complaints, changes, or physical findings at this time. Current Facility-Administered Medications   Medication Dose Route Frequency Provider Last Rate Last Dose    sodium chloride (NS) flush 5-10 mL  5-10 mL IntraVENous Q8H Vi Richard MD        sodium chloride (NS) flush 5-10 mL  5-10 mL IntraVENous PRN Vi Richard MD         Current Outpatient Prescriptions   Medication Sig Dispense Refill    HYDROcodone-acetaminophen (NORCO) 5-325 mg per tablet Take 1 Tab by mouth every six (6) hours as needed for Pain. Max Daily Amount: 4 Tabs. 20 Tab 0    benzonatate (TESSALON) 200 mg capsule Take 200 mg by mouth three (3) times daily as needed for Cough.  diclofenac EC (VOLTAREN) 75 mg EC tablet Take 75 mg by mouth two (2) times a day.  gabapentin (NEURONTIN) 300 mg capsule Take 1 Cap by mouth three (3) times daily. 90 Cap 1    loratadine-pseudoephedrine (CLARITIN-D 12 HOUR) 5-120 mg per tablet Take 1 Tab by mouth two (2) times a day.  levocarnitine HCl (ACETYL-L-CARNITINE) Take 500 mg by mouth daily.  metFORMIN ER (GLUCOPHAGE XR) 500 mg tablet Take one tablet in the morning with breakfast and one tablet in the evening with dinner.  270 Tab 3    buPROPion XL (WELLBUTRIN XL) 150 mg tablet TAKE 1 TABLET BY MOUTH EVERY DAY 90 Tab 3    sertraline (ZOLOFT) 100 mg tablet TAKE 1 TABLET BY MOUTH DAILY 90 Tab 3    oxazepam (SERAX) 15 mg capsule TAKE 2 CAPSULES AT BEDTIME 60 Cap 2    fexofenadine-pseudoephedrine (ALLEGRA-D 24 HOUR) 180-240 mg per tablet Take 1 Tab by mouth daily as needed.  pravastatin (PRAVACHOL) 80 mg tablet Take 1 Tab by mouth nightly. 90 Tab 3    hydroCHLOROthiazide (HYDRODIURIL) 25 mg tablet TAKE 1 TABLET BY MOUTH DAILY 90 Tab 3    albuterol (VENTOLIN HFA) 90 mcg/actuation inhaler Take  by inhalation every four (4) hours as needed.  levalbuterol (XOPENEX) 1.25 mg/0.5 mL nebu 1.25 mg by Nebulization route as needed.  multivitamin (ONE A DAY) tablet Take 1 Tab by mouth daily.  mometasone (ELOCON) 0.1 % topical cream Apply  to affected area daily as needed.  azelaic acid (FINACEA) 15 % topical gel Apply  to affected area two (2) times a day.  carvedilol (COREG) 6.25 mg tablet Take 3.125 mg by mouth daily.  clindamycin (CLINDAGEL) 1 % topical gel Apply  to affected area two (2) times a day. use thin film on affected area         Past History     Past Medical History:  Past Medical History:   Diagnosis Date    Abnormal LFTs 08/24/2017    FATTY LIVER    Arthritis     OSTEO    Avascular necrosis of hip ([address] 08/24/2017    BILAT HIPS    Breast CA ([address]     BILATERAL; SURGERY, CHEMO    Chronic pain     CKD (chronic kidney disease), stage II 8/24/2017    Coagulation disorder ([facility] 1984    ITP  (DR CHU BRADSHAW)    Depression     Diabetes ([facility]     TYPE 2; NIDDM    DJD (degenerative joint disease), multiple sites 8/24/2017    GI bleed 2008    HEMORRHOIDS    Hyperlipemia     Hypertension with renal disease 8/24/2017    IBS (irritable bowel syndrome) 8/24/2017    Myalgia 8/24/2017    On statin therapy 8/24/2017    Overactive bladder 8/24/2017    Pneumonia 04/2015    HOSPITALIZED 3 WEEKS.     Polymyalgia rheumatica (HCC) 8/24/2017    Prophylactic antibiotic 8/24/2017    Rosacea        Past Surgical History:  Past Surgical History:   Procedure Laterality Date    BREAST SURGERY PROCEDURE UNLISTED      RECONSTRUCTION X2    HX APPENDECTOMY  1950    HX CATARACT REMOVAL Bilateral     W /IOL    HX GI      COLONOSCOPY    HX HEENT      WISDOM TEETH    HX HYSTERECTOMY  2000S    HX MASTECTOMY  1989    bilateral    HX MASTECTOMY  2001    HX ORTHOPAEDIC      back fusion 2008-LUMBAR, 2018 - thoracic and cervical fusion    HX TUBAL LIGATION  1981    TOTAL HIP ARTHROPLASTY Left 11/16/2016    ANTERIOR APPROACH, DR Gwendolyn De La Torre; (POSTOP: STANDS ONE INCH TALLER ON LEFT FOOT)    TOTAL HIP ARTHROPLASTY Right 12/2016    ANTERIOR APPROACH (DR Gwendolyn De La Torre)       Family History:  Family History   Problem Relation Age of Onset    Heart Disease Mother     Kidney Disease Mother     Lung Disease Mother      COPD    Anesth Problems Neg Hx        Social History:  Social History   Substance Use Topics    Smoking status: Never Smoker    Smokeless tobacco: Never Used    Alcohol use No       Allergies: Allergies   Allergen Reactions    Statins-Hmg-Coa Reductase Inhibitors Myalgia     Myalgia with pravachol and multiple statins    Codeine Rash     Rash on thighs    Darvon [Propoxyphene] Other (comments)     \"I see worms\"    Pcn [Penicillins] Rash    Sulfa (Sulfonamide Antibiotics) Rash         Review of Systems   Review of Systems   Constitutional: Negative for chills and fever. HENT: Negative for congestion, ear pain, rhinorrhea and sore throat. Respiratory: Negative for cough and shortness of breath. Cardiovascular: Negative for chest pain, palpitations and leg swelling. Gastrointestinal: Negative for abdominal pain, constipation, diarrhea, nausea and vomiting. No melena  No hematochezia   Endocrine: Negative for polyuria. Genitourinary: Negative for dysuria, frequency and hematuria.         No bowel / urinary incontinence   Musculoskeletal: Positive for back pain. Neurological: Negative for dizziness, weakness, light-headedness, numbness and headaches. No tingling  No saddle anesthesia   All other systems reviewed and are negative. Physical Exam   Physical Exam   Nursing note and vitals reviewed.     General appearance - well nourished, well appearing, and in no distress  Eyes - pupils equal and reactive, extraocular eye movements intact  ENT - mucous membranes moist, pharynx normal without lesions  Neck - supple, no significant adenopathy; non-tender to palpation  Chest - clear to auscultation, no wheezes, rales or rhonchi; non-tender to palpation  Heart - normal rate and regular rhythm, S1 and S2 normal, no murmurs noted  Abdomen - soft, nontender, nondistended, no masses or organomegaly  Musculoskeletal - no joint deformity or swelling; normal ROM, tenderness to palpation R of the midline at the very superior aspect of the incision, but no visible abnormalities  Extremities - peripheral pulses normal, no pedal edema  Skin - normal coloration and turgor, no rashes, Incision that extends from her thoracic area down her to her lumbar spine with zip-line sutures in place; wound clean dry intact, no surrounding erythema, no drainage, no warmth  Neurological - alert, oriented x3, normal speech, no focal findings or movement disorder noted  Written by PARVEEN Conleyibyin, as dictated by Trinity Clement MD      Diagnostic Study Results     Labs -     Recent Results (from the past 12 hour(s))   CBC WITH AUTOMATED DIFF    Collection Time: 08/13/18 10:28 PM   Result Value Ref Range    WBC 6.2 3.6 - 11.0 K/uL    RBC 3.67 (L) 3.80 - 5.20 M/uL    HGB 12.2 11.5 - 16.0 g/dL    HCT 35.8 35.0 - 47.0 %    MCV 97.5 80.0 - 99.0 FL    MCH 33.2 26.0 - 34.0 PG    MCHC 34.1 30.0 - 36.5 g/dL    RDW 12.9 11.5 - 14.5 %    PLATELET 526 270 - 702 K/uL    MPV 9.2 8.9 - 12.9 FL    NRBC 0.0 0  WBC    ABSOLUTE NRBC 0.00 0.00 - 0.01 K/uL    NEUTROPHILS 53 32 - 75 % LYMPHOCYTES 32 12 - 49 %    MONOCYTES 9 5 - 13 %    EOSINOPHILS 6 0 - 7 %    BASOPHILS 1 0 - 1 %    IMMATURE GRANULOCYTES 0 0.0 - 0.5 %    ABS. NEUTROPHILS 3.3 1.8 - 8.0 K/UL    ABS. LYMPHOCYTES 2.0 0.8 - 3.5 K/UL    ABS. MONOCYTES 0.5 0.0 - 1.0 K/UL    ABS. EOSINOPHILS 0.3 0.0 - 0.4 K/UL    ABS. BASOPHILS 0.0 0.0 - 0.1 K/UL    ABS. IMM. GRANS. 0.0 0.00 - 0.04 K/UL    DF AUTOMATED     METABOLIC PANEL, COMPREHENSIVE    Collection Time: 08/13/18 10:28 PM   Result Value Ref Range    Sodium 141 136 - 145 mmol/L    Potassium 3.4 (L) 3.5 - 5.1 mmol/L    Chloride 104 97 - 108 mmol/L    CO2 29 21 - 32 mmol/L    Anion gap 8 5 - 15 mmol/L    Glucose 121 (H) 65 - 100 mg/dL    BUN 28 (H) 6 - 20 MG/DL    Creatinine 0.87 0.55 - 1.02 MG/DL    BUN/Creatinine ratio 32 (H) 12 - 20      GFR est AA >60 >60 ml/min/1.73m2    GFR est non-AA >60 >60 ml/min/1.73m2    Calcium 9.7 8.5 - 10.1 MG/DL    Bilirubin, total 0.4 0.2 - 1.0 MG/DL    ALT (SGPT) 51 12 - 78 U/L    AST (SGOT) 57 (H) 15 - 37 U/L    Alk.  phosphatase 105 45 - 117 U/L    Protein, total 7.2 6.4 - 8.2 g/dL    Albumin 3.4 (L) 3.5 - 5.0 g/dL    Globulin 3.8 2.0 - 4.0 g/dL    A-G Ratio 0.9 (L) 1.1 - 2.2     D DIMER    Collection Time: 08/13/18 10:28 PM   Result Value Ref Range    D-dimer 3.54 (H) 0.00 - 0.65 mg/L FEU   CK W/ CKMB & INDEX    Collection Time: 08/13/18 10:28 PM   Result Value Ref Range    CK 37 26 - 192 U/L    CK - MB <1.0 <3.6 NG/ML    CK-MB Index Cannot be calculated 0 - 2.5     TROPONIN I    Collection Time: 08/13/18 10:28 PM   Result Value Ref Range    Troponin-I, Qt. <0.05 <0.05 ng/mL   EKG, 12 LEAD, INITIAL    Collection Time: 08/13/18 10:43 PM   Result Value Ref Range    Ventricular Rate 74 BPM    Atrial Rate 74 BPM    P-R Interval 174 ms    QRS Duration 100 ms    Q-T Interval 432 ms    QTC Calculation (Bezet) 479 ms    Calculated P Axis 44 degrees    Calculated R Axis -19 degrees    Calculated T Axis 40 degrees    Diagnosis       Sinus rhythm with occasional premature ventricular complexes  Cannot rule out Anterior infarct , age undetermined  When compared with ECG of 09-JUL-2018 12:04,  premature ventricular complexes are now present         Radiologic Studies -   XR SPINE [facility] 3 V   Final Result        CXR Results  (Last 48 hours)               08/13/18 2257  XR CHEST PA LAT Final result    Impression:  IMPRESSION: No acute intrathoracic disease. Narrative:  CLINICAL HISTORY: Chest pain, thoracic fusion, pain between shoulder blades with   deep breathing    INDICATION: Chest pain, thoracic fusion, pain between shoulder blades with deep   breathing       COMPARISON: 1/8/2018       FINDINGS:    PA and lateral views of the chest are obtained. The cardiopericardial silhouette is within normal limits. There is no pleural   effusion, pneumothorax or focal consolidation present. Thoracic stabilization   hardware. Medical Decision Making   I am the first provider for this patient. I reviewed the vital signs, available nursing notes, past medical history, past surgical history, family history and social history. Vital Signs-Reviewed the patient's vital signs.   Patient Vitals for the past 12 hrs:   Temp Pulse Resp BP SpO2   08/14/18 0258 - 73 - 108/65 99 %   08/14/18 0100 - 73 20 137/73 96 %   08/14/18 0031 - 74 22 135/74 98 %   08/14/18 0000 - 76 - 148/76 95 %   08/13/18 2304 - 73 27 - 99 %   08/13/18 2300 - - - 152/81 100 %   08/13/18 2257 - - - 152/74 -   08/13/18 2113 97.9 °F (36.6 °C) 79 18 141/70 99 %       Pulse Oximetry Analysis - 100% on RA    Cardiac Monitor:   Rate: 74bpm  Rhythm: Normal Sinus Rhythm       Records Reviewed: Nursing Notes, Old Medical Records, Previous electrocardiograms, Ambulance Run Sheet, Previous Radiology Studies and Previous Laboratory Studies    Provider Notes (Medical Decision Making):     DDx: sprain, strain, post-surgical pain, PE, pneumothorax, PNA    ED Course:   Initial assessment performed. The patients presenting problems have been discussed, and they are in agreement with the care plan formulated and outlined with them. I have encouraged them to ask questions as they arise throughout their visit. EKG interpretation: (Preliminary) 2250  Rhythm: sinus rhythm and PVC's. Rate (approx.): 74bpm; Axis: normal; Normal MA, QRS, QTc intervals; ST/T wave: non-specific changes;  Written by Beena Gore, ED Scribe, as dictated by Alexei Parham MD    PROGRESS NOTE:  12:30 AM  Pt reevaluated. Pt resting comfortably at this time. D-dimer elevated; will further evaluate with CTA. Written by Beena Gore, ED Scribe, as dictated by Vi Richard MD    Progress note:  2:17 AM  Pt noted to be feeling better, ready for discharge. Updated pt and/or family on all final lab and imaging findings. Will follow up as instructed. All questions have been answered, pt voiced understanding and agreement with plan. Narcotics were prescribed, pt was advised not to drive or operate heavy machinery. Specific return precautions provided as well as instructions to return to the ED should sx worsen at any time. Vital signs stable for discharge. Written by Beena Gore, ED Scribe, as dictated by Vi Richard MD    Diagnosis     Clinical Impression:   1. Acute right-sided thoracic back pain        PLAN:  1. Discharge Medication List as of 8/14/2018  2:09 AM      START taking these medications    Details   HYDROcodone-acetaminophen (NORCO) 5-325 mg per tablet Take 1 Tab by mouth every six (6) hours as needed for Pain.  Max Daily Amount: 4 Tabs., Print, Disp-20 Tab, R-0         CONTINUE these medications which have NOT CHANGED    Details   benzonatate (TESSALON) 200 mg capsule Take 200 mg by mouth three (3) times daily as needed for Cough., Historical Med      diclofenac EC (VOLTAREN) 75 mg EC tablet Take 75 mg by mouth two (2) times a day., Historical Med      gabapentin (NEURONTIN) 300 mg capsule Take 1 Cap by mouth three (3) times daily. , Print, Disp-90 Cap, R-1      loratadine-pseudoephedrine (CLARITIN-D 12 HOUR) 5-120 mg per tablet Take 1 Tab by mouth two (2) times a day., Historical Med      levocarnitine HCl (ACETYL-L-CARNITINE) Take 500 mg by mouth daily. , Historical Med      metFORMIN ER (GLUCOPHAGE XR) 500 mg tablet Take one tablet in the morning with breakfast and one tablet in the evening with dinner., NormalIncrease in dose. Patient does not need this filled at this time. Put on hold until she is ready for it. Disp-270 Tab, R-3      buPROPion XL (WELLBUTRIN XL) 150 mg tablet TAKE 1 TABLET BY MOUTH EVERY DAY, Normal, Disp-90 Tab, R-3      sertraline (ZOLOFT) 100 mg tablet TAKE 1 TABLET BY MOUTH DAILY, Normal, Disp-90 Tab, R-3      oxazepam (SERAX) 15 mg capsule TAKE 2 CAPSULES AT BEDTIME, PrintGeneric For:SERAX   15MGDisp-60 Cap, R-2      fexofenadine-pseudoephedrine (ALLEGRA-D 24 HOUR) 180-240 mg per tablet Take 1 Tab by mouth daily as needed., Historical Med      pravastatin (PRAVACHOL) 80 mg tablet Take 1 Tab by mouth nightly., Normal, Disp-90 Tab, R-3      hydroCHLOROthiazide (HYDRODIURIL) 25 mg tablet TAKE 1 TABLET BY MOUTH DAILY, Normal, Disp-90 Tab, R-3      albuterol (VENTOLIN HFA) 90 mcg/actuation inhaler Take  by inhalation every four (4) hours as needed., Historical Med      levalbuterol (XOPENEX) 1.25 mg/0.5 mL nebu 1.25 mg by Nebulization route as needed., Historical Med      multivitamin (ONE A DAY) tablet Take 1 Tab by mouth daily. , Historical Med      mometasone (ELOCON) 0.1 % topical cream Apply  to affected area daily as needed., Historical Med      azelaic acid (FINACEA) 15 % topical gel Apply  to affected area two (2) times a day., Historical Med      carvedilol (COREG) 6.25 mg tablet Take 3.125 mg by mouth daily. , Historical Med      clindamycin (CLINDAGEL) 1 % topical gel Apply  to affected area two (2) times a day. use thin film on affected area, Historical Med           2. Follow-up Information     Follow up With Details Comments Contact Info    Julia Maravilla MD Schedule an appointment as soon as possible for a visit  [address] [facility] [address]      Jude Freeman MD Schedule an appointment as soon as possible for a visit  [address]       [facility] EMERGENCY DEPT  If symptoms worsen [address] [facility]  [address]  [phone]        Return to ED if worse     Disposition:    DISCHARGE NOTE:  2:17 AM  The patient's results have been reviewed with family and/or caregiver. They verbally convey their understanding and agreement of the patient's signs, symptoms, diagnosis, treatment, and prognosis. They additionally agree to follow up as recommended in the discharge instructions or to return to the Emergency Room should the patient's condition change prior to their follow-up appointment. The family and/or caregiver verbally agrees with the care-plan and all of their questions have been answered. The discharge instructions have also been provided to the them along with educational information regarding the patient's diagnosis and a list of reasons why the patient would want to return to the ER prior to their follow-up appointment should their condition change. Written by Dario Burns, ED Scribe, as dictated by Magdy Cano MD.             Attestations: This note is prepared by Dario Burns, acting as Scribe for MD Vi Schwartz MD : The scribe's documentation has been prepared under my direction and personally reviewed by me in its entirety. I confirm that the note above accurately reflects all work, treatment, procedures, and medical decision making performed by me. This note will not be viewable in [address]

## 2018-08-14 NOTE — DISCHARGE INSTRUCTIONS
Back Pain: Care Instructions  Your Care Instructions    Back pain has many possible causes. It is often related to problems with muscles and ligaments of the back. It may also be related to problems with the nerves, discs, or bones of the back. Moving, lifting, standing, sitting, or sleeping in an awkward way can strain the back. Sometimes you don't notice the injury until later. Arthritis is another common cause of back pain. Although it may hurt a lot, back pain usually improves on its own within several weeks. Most people recover in 12 weeks or less. Using good home treatment and being careful not to stress your back can help you feel better sooner. Follow-up care is a key part of your treatment and safety. Be sure to make and go to all appointments, and call your doctor if you are having problems. It's also a good idea to know your test results and keep a list of the medicines you take. How can you care for yourself at home? · Sit or lie in positions that are most comfortable and reduce your pain. Try one of these positions when you lie down:  ¨ Lie on your back with your knees bent and supported by large pillows. ¨ Lie on the floor with your legs on the seat of a sofa or chair. Britni Needle on your side with your knees and hips bent and a pillow between your legs. ¨ Lie on your stomach if it does not make pain worse. · Do not sit up in bed, and avoid soft couches and twisted positions. Bed rest can help relieve pain at first, but it delays healing. Avoid bed rest after the first day of back pain. · Change positions every 30 minutes. If you must sit for long periods of time, take breaks from sitting. Get up and walk around, or lie in a comfortable position. · Try using a heating pad on a low or medium setting for 15 to 20 minutes every 2 or 3 hours. Try a warm shower in place of one session with the heating pad. · You can also try an ice pack for 10 to 15 minutes every 2 to 3 hours.  Put a thin cloth between the ice pack and your skin. · Take pain medicines exactly as directed. ¨ If the doctor gave you a prescription medicine for pain, take it as prescribed. ¨ If you are not taking a prescription pain medicine, ask your doctor if you can take an over-the-counter medicine. · Take short walks several times a day. You can start with 5 to 10 minutes, 3 or 4 times a day, and work up to longer walks. Walk on level surfaces and avoid hills and stairs until your back is better. · Return to work and other activities as soon as you can. Continued rest without activity is usually not good for your back. · To prevent future back pain, do exercises to stretch and strengthen your back and stomach. Learn how to use good posture, safe lifting techniques, and proper body mechanics. When should you call for help? Call your doctor now or seek immediate medical care if:    · You have new or worsening numbness in your legs.     · You have new or worsening weakness in your legs. (This could make it hard to stand up.)     · You lose control of your bladder or bowels.    Watch closely for changes in your health, and be sure to contact your doctor if:    · You have a fever, lose weight, or don't feel well.     · You do not get better as expected. Where can you learn more? Go to http://malia-lincoln.info/. Enter S593 in the search box to learn more about \"Back Pain: Care Instructions. \"  Current as of: November 29, 2017  Content Version: 11.7  © 6283-2103 [employer]. Care instructions adapted under license by [employer] (which disclaims liability or warranty for this information). If you have questions about a medical condition or this instruction, always ask your healthcare professional. [place] [address] any warranty or liability for your use of this information.

## 2018-08-14 NOTE — ED NOTES
Pt discharged by MD Richard. Pt provided with discharge instructions Rx and instructions on follow up care. Pt out of ED in stable condition accompanied by RN via wheelchair.

## 2018-08-28 ENCOUNTER — OFFICE VISIT (OUTPATIENT)
Dept: INTERNAL MEDICINE CLINIC | Age: 73
End: 2018-08-28

## 2018-08-28 VITALS
SYSTOLIC BLOOD PRESSURE: 130 MMHG | WEIGHT: 187 LBS | DIASTOLIC BLOOD PRESSURE: 80 MMHG | HEIGHT: 64 IN | BODY MASS INDEX: 31.92 KG/M2 | OXYGEN SATURATION: 94 % | RESPIRATION RATE: 17 BRPM | HEART RATE: 103 BPM

## 2018-08-28 DIAGNOSIS — E66.09 CLASS 1 OBESITY DUE TO EXCESS CALORIES WITHOUT SERIOUS COMORBIDITY WITH BODY MASS INDEX (BMI) OF 33.0 TO 33.9 IN ADULT: ICD-10-CM

## 2018-08-28 DIAGNOSIS — I12.9 HYPERTENSION WITH RENAL DISEASE: ICD-10-CM

## 2018-08-28 DIAGNOSIS — E11.22 CONTROLLED TYPE 2 DIABETES MELLITUS WITH STAGE 2 CHRONIC KIDNEY DISEASE, WITH LONG-TERM CURRENT USE OF INSULIN (HCC): ICD-10-CM

## 2018-08-28 DIAGNOSIS — N18.2 CONTROLLED TYPE 2 DIABETES MELLITUS WITH STAGE 2 CHRONIC KIDNEY DISEASE, WITH LONG-TERM CURRENT USE OF INSULIN (HCC): ICD-10-CM

## 2018-08-28 DIAGNOSIS — Z79.4 CONTROLLED TYPE 2 DIABETES MELLITUS WITH STAGE 2 CHRONIC KIDNEY DISEASE, WITH LONG-TERM CURRENT USE OF INSULIN (HCC): ICD-10-CM

## 2018-08-28 DIAGNOSIS — M51.9 LUMBAR DISC DISEASE: Primary | ICD-10-CM

## 2018-08-28 NOTE — MR AVS SNAPSHOT
[facility] 
 
 
 [address] [id number] [phone] Patient: Janel Hernadez MRN: [id number] EOA:[id number] Visit Information Date & Time Provider Department Dept. Phone Encounter #  
 8/28/2018  3:20 PM Char Doe [facility] [phone] [id number] Your Appointments 10/15/2018  9:50 AM  
FOLLOW UP 10 with MD ADRIANA Doe [facility] ([address]) Appt Note: [address] [id number] [address] [facility] [id number] Upcoming Health Maintenance Date Due DTaP/Tdap/Td series (1 - Tdap) 1/15/1968 ZOSTER VACCINE AGE 60> 11/15/2006 EYE EXAM RETINAL OR DILATED Q1 5/15/2018 Influenza Age 5 to Adult 8/1/2018 MICROALBUMIN Q1 10/2/2018 HEMOGLOBIN A1C Q6M 1/6/2019 MEDICARE YEARLY EXAM 1/16/2019 FOOT EXAM Q1 4/9/2019 GLAUCOMA SCREENING Q2Y 5/15/2019 LIPID PANEL Q1 7/6/2019 BREAST CANCER SCRN MAMMOGRAM 10/2/2019 COLONOSCOPY 10/3/2026 Allergies as of 8/28/2018  Review Complete On: 8/28/2018 By: Yolande Price CMA Severity Noted Reaction Type Reactions Statins-hmg-coa Reductase Inhibitors High 11/20/2016    Myalgia Myalgia with pravachol and multiple statins Codeine  04/20/2015    Rash Rash on thighs Darvon [Propoxyphene]  04/20/2015    Other (comments) \"I see worms\" Pcn [Penicillins]  04/20/2015    Rash  
 Sulfa (Sulfonamide Antibiotics)  04/20/2015    Rash Current Immunizations  Reviewed on 5/6/2015 Name Date Influenza High Dose Vaccine PF 10/2/2017 Influenza Vaccine 11/2/2015 Pneumococcal Conjugate (PCV-13) 7/27/2015 Pneumococcal Vaccine (Unspecified Type) 1/1/2013, 12/23/2011 Not reviewed this visit Vitals BP Pulse Resp Height(growth percentile) Weight(growth percentile) SpO2 130/80 (BP 1 Location: Left arm, BP Patient Position: Sitting) (!) 103 17 5' 4\" (1.626 m) 187 lb (84.8 kg) 94% BMI OB Status Smoking Status 32.1 kg/m2 Hysterectomy Never Smoker BMI and BSA Data Body Mass Index Body Surface Area  
 32.1 kg/m 2 1.96 m 2 Preferred Pharmacy Pharmacy Name Phone [facility] [phone] Your Updated Medication List  
  
   
This list is accurate as of 8/28/18  4:25 PM.  Always use your most recent med list.  
  
  
  
  
 ACETYL-L-CARNITINE Take 500 mg by mouth daily. ALLEGRA-D 24 HOUR 180-240 mg per tablet Generic drug:  fexofenadine-pseudoephedrine Take 1 Tab by mouth daily as needed. benzonatate 200 mg capsule Commonly known as:  TESSALON Take 200 mg by mouth three (3) times daily as needed for Cough. buPROPion  mg tablet Commonly known as:  WELLBUTRIN XL  
TAKE 1 TABLET BY MOUTH EVERY DAY  
  
 CLARITIN-D 12 HOUR 5-120 mg per tablet Generic drug:  loratadine-pseudoephedrine Take 1 Tab by mouth two (2) times a day. clindamycin 1 % topical gel Commonly known as:  CLINDAGEL Apply  to affected area two (2) times a day. use thin film on affected area COREG 6.25 mg tablet Generic drug:  carvedilol Take 3.125 mg by mouth daily. diclofenac EC 75 mg EC tablet Commonly known as:  VOLTAREN Take 75 mg by mouth two (2) times a day. ELOCON 0.1 % topical cream  
Generic drug:  mometasone Apply  to affected area daily as needed. FINACEA 15 % topical gel Generic drug:  azelaic acid Apply  to affected area two (2) times a day.  
  
 gabapentin 300 mg capsule Commonly known as:  NEURONTIN Take 1 Cap by mouth three (3) times daily. hydroCHLOROthiazide 25 mg tablet Commonly known as:  HYDRODIURIL  
TAKE 1 TABLET BY MOUTH DAILY HYDROcodone-acetaminophen 5-325 mg per tablet Commonly known as:  Rajwinderjerman Delgadogill Take 1 Tab by mouth every six (6) hours as needed for Pain. Max Daily Amount: 4 Tabs. levalbuterol 1.25 mg/0.5 mL Nebu Commonly known as:  XOPENEX  
1.25 mg by Nebulization route as needed. metFORMIN  mg tablet Commonly known as:  GLUCOPHAGE XR Take one tablet in the morning with breakfast and one tablet in the evening with dinner. multivitamin tablet Commonly known as:  ONE A DAY Take 1 Tab by mouth daily. oxazepam 15 mg capsule Commonly known as:  SERAX TAKE 2 CAPSULES AT BEDTIME  
  
 pravastatin 80 mg tablet Commonly known as:  PRAVACHOL Take 1 Tab by mouth nightly. sertraline 100 mg tablet Commonly known as:  ZOLOFT  
TAKE 1 TABLET BY MOUTH DAILY VENTOLIN HFA 90 mcg/actuation inhaler Generic drug:  albuterol Take  by inhalation every four (4) hours as needed. Introducing [facility]! Jony Toledo introduces [employer] patient portal. Now you can access parts of your medical record, email your doctor's office, and request medication refills online. 1. In your internet browser, go to [email or web address] 2. Click on the First Time User? Click Here link in the Sign In box. You will see the New Member Sign Up page. 3. Enter your [employer] Access Code exactly as it appears below. You will not need to use this code after youve completed the sign-up process. If you do not sign up before the expiration date, you must request a new code. · [employer] Access Code: [email or web address] Expires: 10/7/2018 12:04 PM 
 
4. Enter the last four digits of your Social Security Number (xxxx) and Date of Birth (mm/dd/yyyy) as indicated and click Submit. You will be taken to the next sign-up page. 5. Create a SendTaskt ID. This will be your SendTaskt login ID and cannot be changed, so think of one that is secure and easy to remember. 6. Create a SendTaskt password. You can change your password at any time. 7. Enter your Password Reset Question and Answer. This can be used at a later time if you forget your password. 8. Enter your e-mail address. You will receive e-mail notification when new information is available in [address] 9. Click Sign Up. You can now view and download portions of your medical record. 10. Click the Download Summary menu link to download a portable copy of your medical information. If you have questions, please visit the Frequently Asked Questions section of the [email or web address] website. Remember, [email or web address] is NOT to be used for urgent needs. For medical emergencies, dial 911. Now available from your iPhone and Android! Please provide this summary of care documentation to your next provider. Your primary care clinician is listed as Rishabh. If you have any questions after today's visit, please call [phone].

## 2018-08-28 NOTE — PROGRESS NOTES
Chief Complaint Patient presents with  Transitions Of Care 1. Have you been to the ER, urgent care clinic since your last visit? Hospitalized since your last visit? Yes, [facility] on 8/10/18 for pains. 2. Have you seen or consulted any other health care providers outside of the [address] [place] [address] since your last visit? Include any pap smears or colon screening.  No

## 2018-08-28 NOTE — PATIENT INSTRUCTIONS
Body Mass Index: Care Instructions Your Care Instructions Body mass index (BMI) can help you see if your weight is raising your risk for health problems. It uses a formula to compare how much you weigh with how tall you are. · A BMI lower than 18.5 is considered underweight. · A BMI between 18.5 and 24.9 is considered healthy. · A BMI between 25 and 29.9 is considered overweight. A BMI of 30 or higher is considered obese. If your BMI is in the normal range, it means that you have a lower risk for weight-related health problems. If your BMI is in the overweight or obese range, you may be at increased risk for weight-related health problems, such as high blood pressure, heart disease, stroke, arthritis or joint pain, and diabetes. If your BMI is in the underweight range, you may be at increased risk for health problems such as fatigue, lower protection (immunity) against illness, muscle loss, bone loss, hair loss, and hormone problems. BMI is just one measure of your risk for weight-related health problems. You may be at higher risk for health problems if you are not active, you eat an unhealthy diet, or you drink too much alcohol or use tobacco products. Follow-up care is a key part of your treatment and safety. Be sure to make and go to all appointments, and call your doctor if you are having problems. It's also a good idea to know your test results and keep a list of the medicines you take. How can you care for yourself at home? · Practice healthy eating habits. This includes eating plenty of fruits, vegetables, whole grains, lean protein, and low-fat dairy. · If your doctor recommends it, get more exercise. Walking is a good choice. Bit by bit, increase the amount you walk every day. Try for at least 30 minutes on most days of the week. · Do not smoke. Smoking can increase your risk for health problems.  If you need help quitting, talk to your doctor about stop-smoking programs and medicines. These can increase your chances of quitting for good. · Limit alcohol to 2 drinks a day for men and 1 drink a day for women. Too much alcohol can cause health problems. If you have a BMI higher than 25 · Your doctor may do other tests to check your risk for weight-related health problems. This may include measuring the distance around your waist. A waist measurement of more than 40 inches in men or 35 inches in women can increase the risk of weight-related health problems. · Talk with your doctor about steps you can take to stay healthy or improve your health. You may need to make lifestyle changes to lose weight and stay healthy, such as changing your diet and getting regular exercise. If you have a BMI lower than 18.5 · Your doctor may do other tests to check your risk for health problems. · Talk with your doctor about steps you can take to stay healthy or improve your health. You may need to make lifestyle changes to gain or maintain weight and stay healthy, such as getting more healthy foods in your diet and doing exercises to build muscle. Where can you learn more? Go to http://malia-lincoln.info/. Enter S176 in the search box to learn more about \"Body Mass Index: Care Instructions. \" Current as of: October 9, 2017 Content Version: 11.7 © 3924-2255 [employer]. Care instructions adapted under license by [employer] (which disclaims liability or warranty for this information). If you have questions about a medical condition or this instruction, always ask your healthcare professional. [address] any warranty or liability for your use of this information.

## 2018-08-28 NOTE — PROGRESS NOTES
Chief Complaint Patient presents with  Transitions Of Care SUBJECTIVE: 
 
Anaya Granger is a 72 y.o. female who returns to the office today as a transitional care follow-up appointment from her recent hospitalization at [facility] and she was admitted on 7/16 and discharged to rehab on 7/25 with discharge from rehab on 8/9/2018. Because of the fact that this is over 2 weeks is not truly transitional care but it is a regular follow-up. She was in the hospital and did have an L1 to laminectomy with osteotomy and extension of fusion to T10 and also had a dura repair secondary to a nick of the dura. She did have a spinal fluid leak and required bed rest and hospitalization for prolonged period of time as noted and subsequent debilityrequiring rehabilitation. She now returns and seems to be getting around okay by using a cane. She has had no falls. She denies any headaches. She denies any focal weakness. Her back pain is improving. She is only rarely have to take hydrocodone once a day. She denies any chest pain, shortness of breath, palpitations or cardiorespiratory complaints. She denies any GI or  complaints. She has no other complaints. Of note is in the hospital her diclofenac was discontinued and she was discharged with the addition of gabapentin which she has since discontinued as well as the hydrocodone which she is now down to once a day. Her other medicines were continued the same. Hospital discharge summary from hospitalization 7/16/2018 through 7/25/2018 was reviewed today in detail. Current Outpatient Prescriptions Medication Sig Dispense Refill  
 HYDROcodone-acetaminophen (NORCO) 5-325 mg per tablet Take 1 Tab by mouth every six (6) hours as needed for Pain. Max Daily Amount: 4 Tabs. 20 Tab 0  
 benzonatate (TESSALON) 200 mg capsule Take 200 mg by mouth three (3) times daily as needed for Cough.  diclofenac EC (VOLTAREN) 75 mg EC tablet Take 75 mg by mouth two (2) times a day.  loratadine-pseudoephedrine (CLARITIN-D 12 HOUR) 5-120 mg per tablet Take 1 Tab by mouth two (2) times a day.  levocarnitine HCl (ACETYL-L-CARNITINE) Take 500 mg by mouth daily.  metFORMIN ER (GLUCOPHAGE XR) 500 mg tablet Take one tablet in the morning with breakfast and one tablet in the evening with dinner. 270 Tab 3  
 buPROPion XL (WELLBUTRIN XL) 150 mg tablet TAKE 1 TABLET BY MOUTH EVERY DAY 90 Tab 3  
 sertraline (ZOLOFT) 100 mg tablet TAKE 1 TABLET BY MOUTH DAILY 90 Tab 3  
 oxazepam (SERAX) 15 mg capsule TAKE 2 CAPSULES AT BEDTIME 60 Cap 2  
 fexofenadine-pseudoephedrine (ALLEGRA-D 24 HOUR) 180-240 mg per tablet Take 1 Tab by mouth daily as needed.  pravastatin (PRAVACHOL) 80 mg tablet Take 1 Tab by mouth nightly. 90 Tab 3  
 hydroCHLOROthiazide (HYDRODIURIL) 25 mg tablet TAKE 1 TABLET BY MOUTH DAILY 90 Tab 3  
 albuterol (VENTOLIN HFA) 90 mcg/actuation inhaler Take  by inhalation every four (4) hours as needed.  levalbuterol (XOPENEX) 1.25 mg/0.5 mL nebu 1.25 mg by Nebulization route as needed.  multivitamin (ONE A DAY) tablet Take 1 Tab by mouth daily.  mometasone (ELOCON) 0.1 % topical cream Apply  to affected area daily as needed.  azelaic acid (FINACEA) 15 % topical gel Apply  to affected area two (2) times a day.  carvedilol (COREG) 6.25 mg tablet Take 3.125 mg by mouth daily.  clindamycin (CLINDAGEL) 1 % topical gel Apply  to affected area two (2) times a day. use thin film on affected area Past Medical History:  
Diagnosis Date  Abnormal LFTs 08/24/2017 FATTY LIVER  Arthritis OSTEO  
 Avascular necrosis of hip ([facility] 08/24/2017 BILAT HIPS  Breast CA ([facility] BILATERAL; SURGERY, CHEMO  Chronic pain  CKD (chronic kidney disease), stage II 8/24/2017  Coagulation disorder ([facility] 1984 ITP  (DR Kerrie Rodriguez)  Depression  Diabetes ([facility] TYPE 2; NIDDM  DJD (degenerative joint disease), multiple sites 8/24/2017  GI bleed 2008 HEMORRHOIDS  Hyperlipemia  Hypertension with renal disease 8/24/2017  IBS (irritable bowel syndrome) 8/24/2017  Myalgia 8/24/2017  On statin therapy 8/24/2017  Overactive bladder 8/24/2017  Pneumonia 04/2015 HOSPITALIZED 3 WEEKS.  Polymyalgia rheumatica ([facility] 8/24/2017  Prophylactic antibiotic 8/24/2017  Rosacea Past Surgical History:  
Procedure Laterality Date  BREAST SURGERY PROCEDURE UNLISTED    
 RECONSTRUCTION X2  
 [address]  HX BACK SURGERY  07/16/2018  HX CATARACT REMOVAL Bilateral   
 W Annabelle Zane  HX GI    
 COLONOSCOPY  
 HX HEENT    
 WISDOM TEETH  
 HX HYSTERECTOMY  2000S  
 HX MASTECTOMY  1989  
 bilateral  
 HX MASTECTOMY  2001  HX ORTHOPAEDIC    
 back fusion 2008-LUMBAR, 2018 - thoracic and cervical fusion [address] [place] [address]  TOTAL HIP ARTHROPLASTY Left 11/16/2016 ANTERIOR APPROACH, DR Gwendolyn De La Torre; (POSTOP: STANDS ONE INCH TALLER ON LEFT FOOT)  TOTAL HIP ARTHROPLASTY Right 12/2016 ANTERIOR APPROACH (DR Gwendolyn De La Torre) Allergies Allergen Reactions  Statins-Hmg-Coa Reductase Inhibitors Myalgia Myalgia with pravachol and multiple statins  Codeine Rash Rash on thighs  Darvon [Propoxyphene] Other (comments) \"I see worms\"  Pcn [Penicillins] Rash  Sulfa (Sulfonamide Antibiotics) Rash REVIEW OF SYSTEMS: 
General: negative for - chills or fever, or weight loss or gain ENT: negative for - headaches, nasal congestion or tinnitus Eyes: no blurred or visual changes Neck: No stiffness or swollen nodes Respiratory: negative for - cough, hemoptysis, shortness of breath or wheezing Cardiovascular : negative for - chest pain, edema, palpitations or shortness of breath Gastrointestinal: negative for - abdominal pain, blood in stools, heartburn or nausea/vomiting Genito-Urinary: no dysuria, trouble voiding, or hematuria Musculoskeletal: negative for - gait disturbance, joint pain, joint stiffness or joint swelling. Persist with some weakness but nonfocal and is using a cane to get around Neurological: no TIA or stroke symptoms Hematologic: no bruises, no bleeding Lymphatic: no swollen glands Integument: no lumps, mole changes, nail changes or rash Endocrine:no malaise/lethargy poly uria or polydipsia or unexpected weight changes Social History Social History  Marital status:  Spouse name: N/A  
 Number of children: N/A  
 Years of education: N/A Social History Main Topics  Smoking status: Never Smoker  Smokeless tobacco: Never Used  Alcohol use No  
 Drug use: No  
 Sexual activity: No  
 
Other Topics Concern  None Social History Narrative Family History Problem Relation Age of Onset  Heart Disease Mother  Kidney Disease Mother  Lung Disease Mother COPD  Anesth Problems Neg Hx OBJECTIVE:  
 
Visit Vitals  /80 (BP 1 Location: Left arm, BP Patient Position: Sitting)  Pulse (!) 103  Resp 17  Ht 5' 4\" (1.626 m)  Wt 187 lb (84.8 kg)  SpO2 94%  BMI 32.1 kg/m2 CONSTITUTIONAL:   well nourished, appears age appropriate EYES: sclera anicteric, PERRL, EOMI 
ENMT:nares clear, moist mucous membranes, pharynx clear NECK: supple. Thyroid normal, No JVD or bruits RESPIRATORY: Chest: clear to ascultation and percussion, normal inspiratory effort CARDIOVASCULAR: Heart: regular rate and rhythm no murmurs, rubs or gallops, PMI not displaced, No thrills GASTROINTESTINAL: Abdomen: non distended, soft, non tender, bowel sounds normal 
HEMATOLOGIC: no purpura, petechiae or bruising LYMPHATIC: No lymph node enlargemant MUSCULOSKELETAL: Extremities: no edema or active synovitis, pulse 1+. Back support brace in place ambulates with a cane INTEGUMENT: No unusual rashes or suspicious skin lesions noted. Nails appear normal. 
PERIPHERAL VASCULAR: normal pulses femoral, PT and DP NEUROLOGIC: non-focal exam, A & O X 3 PSYCHIATRIC:, appropriate affect ASSESSMENT:  
1. Lumbar disc disease 2. Hypertension with renal disease 3. Controlled type 2 diabetes mellitus with stage 2 chronic kidney disease, with long-term current use of insulin ([address] 4. Class 1 obesity due to excess calories without serious comorbidity with body mass index (BMI) of 33.0 to 33.9 in adult Impression 1. Lumbar disc disease status post L1-2 laminectomy and osteotomy with extension of fusion to T10 as noted and had a dura repair 2. Hypertension that is control 3. Diabetes I did not check lab work today 4. Obesity we did discuss diet, exercise and overall weight reduction for [facility]. I do know weight today is 187 which is actually down 5 pounds from last visit. Follow-up is scheduled with me for October 15 although I will see her sooner should they be a problem. Moderate complexity decision making on this 30 minute office visit today. PLAN: 
. No orders of the defined types were placed in this encounter. ATTENTION:  
This medical record was transcribed using an electronic medical records system. Although proofread, it may and can contain electronic and spelling errors. Other human spelling and other errors may be present. Corrections may be executed at a later time. Please feel free to contact us for any clarifications as needed. Follow-up Disposition: 
Return for At her next scheduled regular appointment October 15. No results found for any visits on 08/28/18. Swapna Carbone MD 
 
The patient verbalized understanding of the problems and plans as explained.

## 2018-08-29 ENCOUNTER — PATIENT OUTREACH (OUTPATIENT)
Dept: INTERNAL MEDICINE CLINIC | Age: 73
End: 2018-08-29

## 2018-08-29 NOTE — PROGRESS NOTES
Patient has graduated from the Transitions of Care Coordination  program on 8/29/18. Patient's symptoms are stable at this time. Patient/family has the ability to self-manage. Care management goals have been completed at this time. No further nurse navigator follow up scheduled. Goals Addressed Most Recent  COMPLETED: Understands red flags post discharge. On track (8/29/2018)  
       
   8/29/18 - Patient had a f/u visit with PCP yesterday and is progressing well from her back surgery. She is getting around well with a cane, and is taking very little pain medication. NN will be available if needed . / vs 
 
 
8/13/18-NN spoke with patient. Reviewed signs and symptoms to watch for post laminectomy. Some of those might be fever, malaise, abnormal drainage at incision site, dizziness. Patient will be aware for any of these red flags and report immediately to MD if they occur. Patient has f/u with NP in surgeons office 8/15/18 and f/u with PCP on 8/28/18. NN will check with patient in 2 weeks to see how she is progressing/vs 
  
  
 
 
Pt has nurse navigator's contact information for any further questions, concerns, or needs. Patients upcoming visits:  Future Appointments Date Time Provider Gwendolyn Loving 10/15/2018 9:50 AM Nick Mcnamara MD [address] [place] [address],[facility], [id number]

## 2018-10-15 ENCOUNTER — OFFICE VISIT (OUTPATIENT)
Dept: INTERNAL MEDICINE CLINIC | Age: 73
End: 2018-10-15

## 2018-10-15 VITALS
DIASTOLIC BLOOD PRESSURE: 80 MMHG | WEIGHT: 187.6 LBS | SYSTOLIC BLOOD PRESSURE: 124 MMHG | HEIGHT: 64 IN | BODY MASS INDEX: 32.03 KG/M2 | HEART RATE: 95 BPM | OXYGEN SATURATION: 97 % | RESPIRATION RATE: 19 BRPM

## 2018-10-15 DIAGNOSIS — N18.2 CONTROLLED TYPE 2 DIABETES MELLITUS WITH STAGE 2 CHRONIC KIDNEY DISEASE, WITH LONG-TERM CURRENT USE OF INSULIN (HCC): ICD-10-CM

## 2018-10-15 DIAGNOSIS — E78.2 MIXED HYPERLIPIDEMIA: ICD-10-CM

## 2018-10-15 DIAGNOSIS — M15.9 PRIMARY OSTEOARTHRITIS INVOLVING MULTIPLE JOINTS: ICD-10-CM

## 2018-10-15 DIAGNOSIS — E66.09 CLASS 1 OBESITY DUE TO EXCESS CALORIES WITHOUT SERIOUS COMORBIDITY WITH BODY MASS INDEX (BMI) OF 33.0 TO 33.9 IN ADULT: ICD-10-CM

## 2018-10-15 DIAGNOSIS — Z23 ENCOUNTER FOR IMMUNIZATION: ICD-10-CM

## 2018-10-15 DIAGNOSIS — N18.2 CKD (CHRONIC KIDNEY DISEASE), STAGE II: ICD-10-CM

## 2018-10-15 DIAGNOSIS — I12.9 HYPERTENSION WITH RENAL DISEASE: Primary | ICD-10-CM

## 2018-10-15 DIAGNOSIS — E11.22 CONTROLLED TYPE 2 DIABETES MELLITUS WITH STAGE 2 CHRONIC KIDNEY DISEASE, WITH LONG-TERM CURRENT USE OF INSULIN (HCC): ICD-10-CM

## 2018-10-15 DIAGNOSIS — Z79.4 CONTROLLED TYPE 2 DIABETES MELLITUS WITH STAGE 2 CHRONIC KIDNEY DISEASE, WITH LONG-TERM CURRENT USE OF INSULIN (HCC): ICD-10-CM

## 2018-10-15 DIAGNOSIS — K58.9 IRRITABLE BOWEL SYNDROME, UNSPECIFIED TYPE: ICD-10-CM

## 2018-10-15 DIAGNOSIS — J30.89 NON-SEASONAL ALLERGIC RHINITIS, UNSPECIFIED TRIGGER: ICD-10-CM

## 2018-10-15 LAB
BACTERIA UA POCT, BACTPOCT: NORMAL
BILIRUB UR QL STRIP: NORMAL
CASTS UA POCT: 0
CLUE CELLS, CLUEPOCT: NEGATIVE
CRYSTALS UA POCT, CRYSPOCT: NEGATIVE
EPITHELIAL CELLS POCT: NORMAL
GLUCOSE UR-MCNC: NEGATIVE MG/DL
KETONES P FAST UR STRIP-MCNC: NEGATIVE MG/DL
MICROALBUMIN UR TEST STR-MCNC: 50 MG/L (ref 0–20)
MUCUS UA POCT, MUCPOCT: NORMAL
PH UR STRIP: 6 [PH] (ref 5–7)
PROT UR QL STRIP: NORMAL
RBC UA POCT, RBCPOCT: NORMAL
SP GR UR STRIP: 1.02 (ref 1.01–1.02)
TRICH UA POCT, TRICHPOC: NEGATIVE
UA UROBILINOGEN AMB POC: NORMAL (ref 0.2–1)
URINALYSIS CLARITY POC: CLEAR
URINALYSIS COLOR POC: NORMAL
URINE BLOOD POC: NEGATIVE
URINE CULT COMMENT, POCT: NORMAL
URINE LEUKOCYTES POC: NORMAL
URINE NITRITES POC: NEGATIVE
WBC UA POCT, WBCPOCT: NORMAL
YEAST UA POCT, YEASTPOC: NEGATIVE

## 2018-10-15 NOTE — PATIENT INSTRUCTIONS
Body Mass Index: Care Instructions Your Care Instructions Body mass index (BMI) can help you see if your weight is raising your risk for health problems. It uses a formula to compare how much you weigh with how tall you are. · A BMI lower than 18.5 is considered underweight. · A BMI between 18.5 and 24.9 is considered healthy. · A BMI between 25 and 29.9 is considered overweight. A BMI of 30 or higher is considered obese. If your BMI is in the normal range, it means that you have a lower risk for weight-related health problems. If your BMI is in the overweight or obese range, you may be at increased risk for weight-related health problems, such as high blood pressure, heart disease, stroke, arthritis or joint pain, and diabetes. If your BMI is in the underweight range, you may be at increased risk for health problems such as fatigue, lower protection (immunity) against illness, muscle loss, bone loss, hair loss, and hormone problems. BMI is just one measure of your risk for weight-related health problems. You may be at higher risk for health problems if you are not active, you eat an unhealthy diet, or you drink too much alcohol or use tobacco products. Follow-up care is a key part of your treatment and safety. Be sure to make and go to all appointments, and call your doctor if you are having problems. It's also a good idea to know your test results and keep a list of the medicines you take. How can you care for yourself at home? · Practice healthy eating habits. This includes eating plenty of fruits, vegetables, whole grains, lean protein, and low-fat dairy. · If your doctor recommends it, get more exercise. Walking is a good choice. Bit by bit, increase the amount you walk every day. Try for at least 30 minutes on most days of the week. · Do not smoke. Smoking can increase your risk for health problems.  If you need help quitting, talk to your doctor about stop-smoking programs and medicines. These can increase your chances of quitting for good. · Limit alcohol to 2 drinks a day for men and 1 drink a day for women. Too much alcohol can cause health problems. If you have a BMI higher than 25 · Your doctor may do other tests to check your risk for weight-related health problems. This may include measuring the distance around your waist. A waist measurement of more than 40 inches in men or 35 inches in women can increase the risk of weight-related health problems. · Talk with your doctor about steps you can take to stay healthy or improve your health. You may need to make lifestyle changes to lose weight and stay healthy, such as changing your diet and getting regular exercise. If you have a BMI lower than 18.5 · Your doctor may do other tests to check your risk for health problems. · Talk with your doctor about steps you can take to stay healthy or improve your health. You may need to make lifestyle changes to gain or maintain weight and stay healthy, such as getting more healthy foods in your diet and doing exercises to build muscle. Where can you learn more? Go to http://malia-lincoln.info/. Enter S176 in the search box to learn more about \"Body Mass Index: Care Instructions. \" Current as of: June 26, 2018 Content Version: 11.8 © 5809-2267 [employer]. Care instructions adapted under license by [employer] (which disclaims liability or warranty for this information). If you have questions about a medical condition or this instruction, always ask your healthcare professional. [address] any warranty or liability for your use of this information.

## 2018-10-15 NOTE — PROGRESS NOTES
After obtaining consent, and per verbal order from Dr. Vilma Pham, patient received influenza vaccine given by Mary Pedraza RN  in Left Deltoid. Influenza Vaccine 0.5 mL IM now. Patient was observed for 15 minutes post injection. Patient tolerated injection well with no adverse effects. VIS given.

## 2018-10-15 NOTE — PROGRESS NOTES
Chief Complaint Patient presents with  Hypertension 3 month follow up  Diabetes  Obesity SUBJECTIVE: 
 
Kelsea Cantu is a 72 y.o. female who returns in follow-up of medical problems include hypertension, diabetes, hyperlipidemia, obesity, CKD stage II, DJD, and other medical problems. She is slowly recovering from back surgery but is still wearing a back brace and is due to go back to the surgeon next week. She currently denies any chest pain, shortness breath, palpitations or cardiorespiratory complaints. She denies any GI or  complaints. She denies any headaches, dizziness or neurological planes. Other than the back pain she seems to be stable as far as her arthritic complaints. She has no other complaints on complete review of systems. She is taking her medications and trying to get some exercise and trying to follow her diet. Current Outpatient Prescriptions Medication Sig Dispense Refill  
 HYDROcodone-acetaminophen (NORCO) 5-325 mg per tablet Take 1 Tab by mouth every six (6) hours as needed for Pain. Max Daily Amount: 4 Tabs. 20 Tab 0  
 benzonatate (TESSALON) 200 mg capsule Take 200 mg by mouth three (3) times daily as needed for Cough.  diclofenac EC (VOLTAREN) 75 mg EC tablet Take 75 mg by mouth two (2) times a day.  loratadine-pseudoephedrine (CLARITIN-D 12 HOUR) 5-120 mg per tablet Take 1 Tab by mouth two (2) times a day.  levocarnitine HCl (ACETYL-L-CARNITINE) Take 500 mg by mouth daily.  metFORMIN ER (GLUCOPHAGE XR) 500 mg tablet Take one tablet in the morning with breakfast and one tablet in the evening with dinner.  270 Tab 3  
 buPROPion XL (WELLBUTRIN XL) 150 mg tablet TAKE 1 TABLET BY MOUTH EVERY DAY 90 Tab 3  
 sertraline (ZOLOFT) 100 mg tablet TAKE 1 TABLET BY MOUTH DAILY 90 Tab 3  
 oxazepam (SERAX) 15 mg capsule TAKE 2 CAPSULES AT BEDTIME 60 Cap 2  
 fexofenadine-pseudoephedrine (ALLEGRA-D 24 HOUR) 180-240 mg per tablet Take 1 Tab by mouth daily as needed.  pravastatin (PRAVACHOL) 80 mg tablet Take 1 Tab by mouth nightly. 90 Tab 3  
 hydroCHLOROthiazide (HYDRODIURIL) 25 mg tablet TAKE 1 TABLET BY MOUTH DAILY 90 Tab 3  
 levalbuterol (XOPENEX) 1.25 mg/0.5 mL nebu 1.25 mg by Nebulization route as needed.  multivitamin (ONE A DAY) tablet Take 1 Tab by mouth daily.  mometasone (ELOCON) 0.1 % topical cream Apply  to affected area daily as needed.  azelaic acid (FINACEA) 15 % topical gel Apply  to affected area two (2) times a day.  carvedilol (COREG) 6.25 mg tablet Take 3.125 mg by mouth daily.  clindamycin (CLINDAGEL) 1 % topical gel Apply  to affected area two (2) times a day. use thin film on affected area Past Medical History:  
Diagnosis Date  Abnormal LFTs 08/24/2017 FATTY LIVER  Arthritis OSTEO  
 Avascular necrosis of hip ([facility] [address] 08/24/2017 BILAT HIPS  Breast CA ([address] BILATERAL; SURGERY, CHEMO  Chronic pain  CKD (chronic kidney disease), stage II 8/24/2017  Coagulation disorder ([facility] [address] 1984 ITP  (DR Elizabeth Ball)  Depression  Diabetes ([address] TYPE 2; NIDDM  DJD (degenerative joint disease), multiple sites 8/24/2017  GI bleed 2008 HEMORRHOIDS  Hyperlipemia  Hypertension with renal disease 8/24/2017  IBS (irritable bowel syndrome) 8/24/2017  Myalgia 8/24/2017  On statin therapy 8/24/2017  Overactive bladder 8/24/2017  Pneumonia 04/2015 HOSPITALIZED 3 WEEKS.  Polymyalgia rheumatica ([address] 8/24/2017  Prophylactic antibiotic 8/24/2017  Rosacea Past Surgical History:  
Procedure Laterality Date  BREAST SURGERY PROCEDURE UNLISTED    
 RECONSTRUCTION X2  
 [address]  HX BACK SURGERY  07/16/2018  HX CATARACT REMOVAL Bilateral   
 [facility] [address] [facility]  HX GI    
 COLONOSCOPY  
 HX HEENT    
 WISDOM TEETH  
 HX HYSTERECTOMY  2000S  
 HX MASTECTOMY  1989  
 bilateral  
 HX MASTECTOMY  2001  HX ORTHOPAEDIC    
 back fusion 2008-LUMBAR, 2018 - thoracic and cervical fusion [address]  TOTAL HIP ARTHROPLASTY Left 11/16/2016 ANTERIOR APPROACH, DR Gwendolyn De La Torre; (POSTOP: STANDS ONE INCH TALLER ON LEFT FOOT)  TOTAL HIP ARTHROPLASTY Right 12/2016 ANTERIOR APPROACH (DR Gwendolyn De La Torre) Allergies Allergen Reactions  Statins-Hmg-Coa Reductase Inhibitors Myalgia Myalgia with pravachol and multiple statins  Codeine Rash Rash on thighs  Darvon [Propoxyphene] Other (comments) \"I see worms\"  Pcn [Penicillins] Rash  Sulfa (Sulfonamide Antibiotics) Rash REVIEW OF SYSTEMS: 
General: negative for - chills or fever, or weight loss or gain ENT: negative for - headaches, nasal congestion or tinnitus Eyes: no blurred or visual changes Neck: No stiffness or swollen nodes Respiratory: negative for - cough, hemoptysis, shortness of breath or wheezing Cardiovascular : negative for - chest pain, edema, palpitations or shortness of breath Gastrointestinal: negative for - abdominal pain, blood in stools, heartburn or nausea/vomiting Genito-Urinary: no dysuria, trouble voiding, or hematuria Musculoskeletal: negative for - gait disturbance, joint pain, joint stiffness or joint swelling. Slightly improved chronic back pain since her surgery Neurological: no TIA or stroke symptoms Hematologic: no bruises, no bleeding Lymphatic: no swollen glands Integument: no lumps, mole changes, nail changes or rash Endocrine:no malaise/lethargy poly uria or polydipsia or unexpected weight changes Social History Social History  Marital status:  Spouse name: N/A  
 Number of children: N/A  
 Years of education: N/A Social History Main Topics  Smoking status: Never Smoker  Smokeless tobacco: Never Used  Alcohol use No  
 Drug use: No  
 Sexual activity: No  
 
Other Topics Concern  None Social History Narrative Family History Problem Relation Age of Onset  Heart Disease Mother  Kidney Disease Mother  Lung Disease Mother COPD  Anesth Problems Neg Hx OBJECTIVE:  
 
Visit Vitals  /80 (BP 1 Location: Left arm, BP Patient Position: Sitting)  Pulse 95  Resp 19  
 Ht 5' 4\" (1.626 m)  Wt 187 lb 9.6 oz (85.1 kg)  SpO2 97%  BMI 32.2 kg/m2 CONSTITUTIONAL:   well nourished, appears age appropriate EYES: sclera anicteric, PERRL, EOMI 
ENMT:nares clear, moist mucous membranes, pharynx clear NECK: supple. Thyroid normal, No JVD or bruits RESPIRATORY: Chest: clear to ascultation and percussion, normal inspiratory effort CARDIOVASCULAR: Heart: regular rate and rhythm no murmurs, rubs or gallops, PMI not displaced, No thrills GASTROINTESTINAL: Abdomen: non distended, soft, non tender, bowel sounds normal 
HEMATOLOGIC: no purpura, petechiae or bruising LYMPHATIC: No lymph node enlargemant MUSCULOSKELETAL: Extremities: no edema or active synovitis, pulse 1+ INTEGUMENT: No unusual rashes or suspicious skin lesions noted. Nails appear normal. 
PERIPHERAL VASCULAR: normal pulses femoral, PT and DP NEUROLOGIC: non-focal exam, A & O X 3 PSYCHIATRIC:, appropriate affect ASSESSMENT:  
1. Hypertension with renal disease 2. Controlled type 2 diabetes mellitus with stage 2 chronic kidney disease, with long-term current use of insulin ([address] 3. Mixed hyperlipidemia 4. CKD (chronic kidney disease), stage II 5. Primary osteoarthritis involving multiple joints 6. Class 1 obesity due to excess calories without serious comorbidity with body mass index (BMI) of 33.0 to 33.9 in adult 7. Irritable bowel syndrome, unspecified type 8. Non-seasonal allergic rhinitis, unspecified trigger 9. Encounter for immunization Impression 1. Hypertension that is controlled to continue current therapy reviewed with her 2.   Diabetes repeat status pending and prior labs reviewed and I will make further recommendations or adjustments if necessary. 3.  Hyperlipidemia prior labs reviewed and repeat status pending I will adjust if needed. 4 CKD stage II repeat status is pending 5. DJD that is stable 6. Obesity weight is stable however I did encourage her to work on diet, exercise and weight reduction for overall reduction of her weight. 7.  IBS that is stable 8. Allergic rhinitis stable Flu shot given today. I will call with lab results and make further recommendations or adjustments if necessary. If all stable continue same and follow-up with me again in 3 months or sooner should there be a problem. PLAN: 
. Orders Placed This Encounter  Influenza Vaccine Inactivated (IIV)(FLUAD), Subunit, Adjuvanted, IM, ([id number])  METABOLIC PANEL, COMPREHENSIVE  LIPID PANEL  
 CK  
 HEMOGLOBIN A1C WITH EAG  
 AMB POC URINALYSIS DIP STICK AUTO W/ MICRO  AMB POC URINE, MICROALBUMIN, SEMIQUANT (1 RESULT) ATTENTION:  
This medical record was transcribed using an electronic medical records system. Although proofread, it may and can contain electronic and spelling errors. Other human spelling and other errors may be present. Corrections may be executed at a later time. Please feel free to contact us for any clarifications as needed. Follow-up Disposition: 
Return in about 3 months (around 1/15/2019). Results for orders placed or performed in visit on 10/15/18 AMB POC URINALYSIS DIP STICK AUTO W/ MICRO Result Value Ref Range Color (UA POC) Clarity (UA POC) Glucose (UA POC)  Negative Bilirubin (UA POC)  Negative Ketones (UA POC)  Negative Specific gravity (UA POC)  1.010 - 1.025 Blood (UA POC)  Negative pH (UA POC)  5.0 - 7.0 Protein (UA POC)  Negative Urobilinogen (UA POC)  0.2 - 1 Nitrites (UA POC)  Negative Leukocyte esterase (UA POC)  Negative Epithelial cells (UA POC) Mucus (UA POC) WBCs (UA POC) RBCs (UA POC) Casts (UA POC)  Negative Crystals (UA POC)  Negative Clue Cells (UA POC) Trichomonas (UA POC) Yeast (UA POC) Bacteria (UA POC)  Negative URINE CULT COMMENT (UA POC) AMB POC URINE, MICROALBUMIN, SEMIQUANT (1 RESULT) Result Value Ref Range Microalbumin urine (POC)  0 - 20 MG/L Oscar Fair MD 
 
The patient verbalized understanding of the problems and plans as explained.

## 2018-10-15 NOTE — PROGRESS NOTES
Chief Complaint Patient presents with  Hypertension 3 month follow up  Diabetes  Obesity 1. Have you been to the ER, urgent care clinic since your last visit? Hospitalized since your last visit? No 
 
2. Have you seen or consulted any other health care providers outside of the [address] [place] [address] since your last visit? Include any pap smears or colon screening. No 
Visit Vitals  /80 (BP 1 Location: Left arm, BP Patient Position: Sitting)  Pulse 95  Resp 19  
 Ht 5' 4\" (1.626 m)  Wt 187 lb 9.6 oz (85.1 kg)  SpO2 97%  BMI 32.2 kg/m2 Fasting

## 2018-10-15 NOTE — MR AVS SNAPSHOT
[address] [place] [address] 70 [address] [address] [phone] Patient: Matt Greene MRN: [id number] VBE:[id number] Visit Information Date & Time Provider Department Dept. Phone Encounter #  
 10/15/2018  9:50 AM Susie Trujillo MD [facility] [id number] Follow-up Instructions Return in about 3 months (around 1/15/2019). Upcoming Health Maintenance Date Due DTaP/Tdap/Td series (1 - Tdap) 1/15/1968 Shingrix Vaccine Age 50> (1 of 2) 1/15/1997 EYE EXAM RETINAL OR DILATED Q1 5/15/2018 Influenza Age 5 to Adult 8/1/2018 HEMOGLOBIN A1C Q6M 1/6/2019 MEDICARE YEARLY EXAM 1/16/2019 FOOT EXAM Q1 4/9/2019 GLAUCOMA SCREENING Q2Y 5/15/2019 LIPID PANEL Q1 7/6/2019 MICROALBUMIN Q1 10/15/2019 COLONOSCOPY 10/3/2026 Allergies as of 10/15/2018  Review Complete On: 10/15/2018 By: Susie Trujillo MD  
  
 Severity Noted Reaction Type Reactions Statins-hmg-coa Reductase Inhibitors High 11/20/2016    Myalgia Myalgia with pravachol and multiple statins Codeine  04/20/2015    Rash Rash on thighs Darvon [Propoxyphene]  04/20/2015    Other (comments) \"I see worms\" Pcn [Penicillins]  04/20/2015    Rash  
 Sulfa (Sulfonamide Antibiotics)  04/20/2015    Rash Current Immunizations  Reviewed on 5/6/2015 Name Date Influenza High Dose Vaccine PF 10/2/2017 Influenza Vaccine 11/2/2015 Influenza Vaccine (Tri) Adjuvanted  Incomplete Pneumococcal Conjugate (PCV-13) 7/27/2015 Pneumococcal Vaccine (Unspecified Type) 1/1/2013, 12/23/2011 Not reviewed this visit You Were Diagnosed With   
  
 Codes Comments Hypertension with renal disease    -  Primary ICD-10-CM: I12.9 ICD-9-CM: 403.90  Controlled type 2 diabetes mellitus with stage 2 chronic kidney disease, with long-term current use of insulin (HCC)     ICD-10-CM: E11.22, N18.2, Z79.4 ICD-9-CM: 250.40, 585.2, V58.67 Mixed hyperlipidemia     ICD-10-CM: E78.2 ICD-9-CM: 272.2 CKD (chronic kidney disease), stage II     ICD-10-CM: N18.2 ICD-9-CM: 585.2 Primary osteoarthritis involving multiple joints     ICD-10-CM: M15.0 ICD-9-CM: 715.09 Class 1 obesity due to excess calories without serious comorbidity with body mass index (BMI) of 33.0 to 33.9 in adult     ICD-10-CM: E66.09, Z68.33 
ICD-9-CM: 278.00, V85.33 Irritable bowel syndrome, unspecified type     ICD-10-CM: K58.9 ICD-9-CM: 035.9 Non-seasonal allergic rhinitis, unspecified trigger     ICD-10-CM: J30.89 ICD-9-CM: 477.8 Encounter for immunization     ICD-10-CM: H71 ICD-9-CM: V03.89 Vitals BP Pulse Resp Height(growth percentile) Weight(growth percentile) SpO2  
 124/80 (BP 1 Location: Left arm, BP Patient Position: Sitting) 95 19 5' 4\" (1.626 m) 187 lb 9.6 oz (85.1 kg) 97% BMI OB Status Smoking Status 32.2 kg/m2 Hysterectomy Never Smoker Vitals History BMI and BSA Data Body Mass Index Body Surface Area  
 32.2 kg/m 2 1.96 m 2 Preferred Pharmacy Pharmacy Name Phone [facility] [phone] Your Updated Medication List  
  
   
This list is accurate as of 10/15/18 11:30 AM.  Always use your most recent med list.  
  
  
  
  
 ACETYL-L-CARNITINE Take 500 mg by mouth daily. ALLEGRA-D 24 HOUR 180-240 mg per tablet Generic drug:  fexofenadine-pseudoephedrine Take 1 Tab by mouth daily as needed. benzonatate 200 mg capsule Commonly known as:  TESSALON Take 200 mg by mouth three (3) times daily as needed for Cough. buPROPion  mg tablet Commonly known as:  WELLBUTRIN XL  
TAKE 1 TABLET BY MOUTH EVERY DAY  
  
 CLARITIN-D 12 HOUR 5-120 mg per tablet Generic drug:  loratadine-pseudoephedrine Take 1 Tab by mouth two (2) times a day. clindamycin 1 % topical gel Commonly known as:  CLINDAGEL Apply  to affected area two (2) times a day. use thin film on affected area COREG 6.25 mg tablet Generic drug:  carvedilol Take 3.125 mg by mouth daily. diclofenac EC 75 mg EC tablet Commonly known as:  VOLTAREN Take 75 mg by mouth two (2) times a day. ELOCON 0.1 % topical cream  
Generic drug:  mometasone Apply  to affected area daily as needed. FINACEA 15 % topical gel Generic drug:  azelaic acid Apply  to affected area two (2) times a day. hydroCHLOROthiazide 25 mg tablet Commonly known as:  HYDRODIURIL  
TAKE 1 TABLET BY MOUTH DAILY HYDROcodone-acetaminophen 5-325 mg per tablet Commonly known as:  Celeste Dylan Take 1 Tab by mouth every six (6) hours as needed for Pain. Max Daily Amount: 4 Tabs. levalbuterol 1.25 mg/0.5 mL Nebu Commonly known as:  XOPENEX  
1.25 mg by Nebulization route as needed. metFORMIN  mg tablet Commonly known as:  GLUCOPHAGE XR Take one tablet in the morning with breakfast and one tablet in the evening with dinner. multivitamin tablet Commonly known as:  ONE A DAY Take 1 Tab by mouth daily. oxazepam 15 mg capsule Commonly known as:  SERAX TAKE 2 CAPSULES AT BEDTIME  
  
 pravastatin 80 mg tablet Commonly known as:  PRAVACHOL Take 1 Tab by mouth nightly. sertraline 100 mg tablet Commonly known as:  ZOLOFT  
TAKE 1 TABLET BY MOUTH DAILY We Performed the Following ADMIN INFLUENZA VIRUS VAC [ HCPC[facility]] AMB POC URINALYSIS DIP STICK AUTO W/ MICRO  [62415 CPT(R)] AMB POC URINE, MICROALBUMIN, SEMIQUANT (1 RESULT) [41175 CPT(R)] CK G311347 CPT(R)] HEMOGLOBIN A1C WITH EAG [40698 CPT(R)] INFLUENZA VACCINE INACTIVATED (IIV), SUBUNIT, ADJUVANTED, IM [id number] CPT(R)] LIPID PANEL [99449 CPT(R)] METABOLIC PANEL, COMPREHENSIVE [97668 CPT(R)] Follow-up Instructions Return in about 3 months (around 1/15/2019). Patient Instructions Body Mass Index: Care Instructions Your Care Instructions Body mass index (BMI) can help you see if your weight is raising your risk for health problems. It uses a formula to compare how much you weigh with how tall you are. · A BMI lower than 18.5 is considered underweight. · A BMI between 18.5 and 24.9 is considered healthy. · A BMI between 25 and 29.9 is considered overweight. A BMI of 30 or higher is considered obese. If your BMI is in the normal range, it means that you have a lower risk for weight-related health problems. If your BMI is in the overweight or obese range, you may be at increased risk for weight-related health problems, such as high blood pressure, heart disease, stroke, arthritis or joint pain, and diabetes. If your BMI is in the underweight range, you may be at increased risk for health problems such as fatigue, lower protection (immunity) against illness, muscle loss, bone loss, hair loss, and hormone problems. BMI is just one measure of your risk for weight-related health problems. You may be at higher risk for health problems if you are not active, you eat an unhealthy diet, or you drink too much alcohol or use tobacco products. Follow-up care is a key part of your treatment and safety. Be sure to make and go to all appointments, and call your doctor if you are having problems. It's also a good idea to know your test results and keep a list of the medicines you take. How can you care for yourself at home? · Practice healthy eating habits. This includes eating plenty of fruits, vegetables, whole grains, lean protein, and low-fat dairy. · If your doctor recommends it, get more exercise. Walking is a good choice. Bit by bit, increase the amount you walk every day. Try for at least 30 minutes on most days of the week. · Do not smoke. Smoking can increase your risk for health problems.  If you need help quitting, talk to your doctor about stop-smoking programs and medicines. These can increase your chances of quitting for good. · Limit alcohol to 2 drinks a day for men and 1 drink a day for women. Too much alcohol can cause health problems. If you have a BMI higher than 25 · Your doctor may do other tests to check your risk for weight-related health problems. This may include measuring the distance around your waist. A waist measurement of more than 40 inches in men or 35 inches in women can increase the risk of weight-related health problems. · Talk with your doctor about steps you can take to stay healthy or improve your health. You may need to make lifestyle changes to lose weight and stay healthy, such as changing your diet and getting regular exercise. If you have a BMI lower than 18.5 · Your doctor may do other tests to check your risk for health problems. · Talk with your doctor about steps you can take to stay healthy or improve your health. You may need to make lifestyle changes to gain or maintain weight and stay healthy, such as getting more healthy foods in your diet and doing exercises to build muscle. Where can you learn more? Go to http://malia-lincoln.info/. Enter S176 in the search box to learn more about \"Body Mass Index: Care Instructions. \" Current as of: June 26, 2018 Content Version: 11.8 © 6016-6472 [employer]. Care instructions adapted under license by [employer] (which disclaims liability or warranty for this information). If you have questions about a medical condition or this instruction, always ask your healthcare professional. [place] [address] any warranty or liability for your use of this information. Introducing [facility]! [facility] introduces [employer] patient portal. Now you can access parts of your medical record, email your doctor's office, and request medication refills online. 1. In your internet browser, go to [email or web address] 2. Click on the First Time User? Click Here link in the Sign In box. You will see the New Member Sign Up page. 3. Enter your [employer] Access Code exactly as it appears below. You will not need to use this code after youve completed the sign-up process. If you do not sign up before the expiration date, you must request a new code. · [employer] Access Code: [email or web address] Expires: 1/3/2019  5:21 AM 
 
4. Enter the last four digits of your Social Security Number (xxxx) and Date of Birth (mm/dd/yyyy) as indicated and click Submit. You will be taken to the next sign-up page. 5. Create a [employer] ID. This will be your PulsePoint login ID and cannot be changed, so think of one that is secure and easy to remember. 6. Create a P[employer]Point password. You can change your password at any time. 7. Enter your Password Reset Question and Answer. This can be used at a later time if you forget your password. 8. Enter your e-mail address. You will receive e-mail notification when new information is available in [address] 9. Click Sign Up. You can now view and download portions of your medical record. 10. Click the Download Summary menu link to download a portable copy of your medical information. If you have questions, please visit the Frequently Asked Questions section of the [employer] website. Remember, [employer] is NOT to be used for urgent needs. For medical emergencies, dial 911. Now available from your iPhone and Android! Please provide this summary of care documentation to your next provider. Your primary care clinician is listed as Rishabh. If you have any questions after today's visit, please call [phone].

## 2018-10-16 LAB
ALBUMIN SERPL-MCNC: 4.6 G/DL (ref 3.5–4.8)
ALBUMIN/GLOB SERPL: 1.5 {RATIO} (ref 1.2–2.2)
ALP SERPL-CCNC: 121 IU/L (ref 39–117)
ALT SERPL-CCNC: 50 IU/L (ref 0–32)
AST SERPL-CCNC: 58 IU/L (ref 0–40)
BILIRUB SERPL-MCNC: 0.5 MG/DL (ref 0–1.2)
BUN SERPL-MCNC: 21 MG/DL (ref 8–27)
BUN/CREAT SERPL: 24 (ref 12–28)
CALCIUM SERPL-MCNC: 9.9 MG/DL (ref 8.7–10.3)
CHLORIDE SERPL-SCNC: 100 MMOL/L (ref 96–106)
CHOLEST SERPL-MCNC: 230 MG/DL (ref 100–199)
CK SERPL-CCNC: 91 U/L (ref 24–173)
CO2 SERPL-SCNC: 25 MMOL/L (ref 20–29)
CREAT SERPL-MCNC: 0.86 MG/DL (ref 0.57–1)
EST. AVERAGE GLUCOSE BLD GHB EST-MCNC: 151 MG/DL
GLOBULIN SER CALC-MCNC: 3 G/DL (ref 1.5–4.5)
GLUCOSE SERPL-MCNC: 153 MG/DL (ref 65–99)
HBA1C MFR BLD: 6.9 % (ref 4.8–5.6)
HDLC SERPL-MCNC: 44 MG/DL
LDLC SERPL CALC-MCNC: 139 MG/DL (ref 0–99)
POTASSIUM SERPL-SCNC: 4.8 MMOL/L (ref 3.5–5.2)
PROT SERPL-MCNC: 7.6 G/DL (ref 6–8.5)
SODIUM SERPL-SCNC: 144 MMOL/L (ref 134–144)
TRIGL SERPL-MCNC: 235 MG/DL (ref 0–149)
VLDLC SERPL CALC-MCNC: 47 MG/DL (ref 5–40)

## 2018-10-16 NOTE — PROGRESS NOTES
Glycohemoglobin is little better although not still at goal it is improved so continue same. Cholesterol and LDL are higher so watch diet.

## 2018-10-18 ENCOUNTER — TELEPHONE (OUTPATIENT)
Dept: INTERNAL MEDICINE CLINIC | Age: 73
End: 2018-10-18

## 2018-10-18 NOTE — TELEPHONE ENCOUNTER
----- Message from Snehal Ribeiro MD sent at 10/16/2018  5:22 PM EDT -----  Glycohemoglobin is little better although not still at goal it is improved so continue same. Cholesterol and LDL are higher so watch diet.

## 2018-11-20 DIAGNOSIS — F41.9 ANXIETY: ICD-10-CM

## 2018-11-21 RX ORDER — OXAZEPAM 15 MG/1
CAPSULE ORAL
Qty: 60 CAP | Refills: 2 | Status: SHIPPED | OUTPATIENT
Start: 2018-11-21 | End: 2019-06-07 | Stop reason: SDUPTHER

## 2018-11-21 NOTE — TELEPHONE ENCOUNTER
RX refill request from the patient/pharmacy. Patient last seen 08- with labs, and next appt. scheduled for 11-  Requested Prescriptions     Pending Prescriptions Disp Refills    oxazepam (SERAX) 15 mg capsule [Pharmacy Med Name: OXAZEPAM  15MG] 60 Cap 2     Sig: TAKE 2 CAPSULES AT BEDTIME   .

## 2019-01-13 PROBLEM — Z00.00 MEDICARE ANNUAL WELLNESS VISIT, SUBSEQUENT: Status: ACTIVE | Noted: 2019-01-13

## 2019-01-13 NOTE — PROGRESS NOTES
This is a Subsequent Medicare Annual Wellness Visit providing Personalized Prevention Plan Services (PPPS) (Performed 12 months after initial AWV and PPPS ) I have reviewed the patient's medical history in detail and updated the computerized patient record. She returns today from her Medicare subsequent annual wellness examination screening questionnaire. She is also here in follow-up of her medical problems include hypertension, diabetes, hyperlipidemia, CKD stage II, obesity, anxiety, history of polymyalgia rheumatica, history of depression, IBS, DJD with recent back surgery in July of last year, and other medical problems. She is taking her medications and trying to follow her diet and trying to get some exercise. She went back to work in October after having her back surgery in July and noted her back started bothering her left side so she went back to orthopedics which time they placed in therapy and that she is in a stretching program which is helping some. She does feel weak and does not have much energy but they did place her on a muscle relaxer when she went back to orthopedics. She notes her hair is thinning but no has no patchy areas of hair loss and her dermatologist told her to use Rogaine for men. She feels like she is not breathing deeply because it seems to hurt in the upper back area. She denies any chest pain, shortness of breath, palpitations, PND, orthopnea or cardiorespiratory complaints. She denies any GI or  complaints. She denies any headaches, dizziness or neurologic complaints. Other than the back there are no current arthritic complaints and she has no other complaints on complete review of systems. History Past Medical History:  
Diagnosis Date  Abnormal LFTs 08/24/2017 FATTY LIVER  Arthritis OSTEO  
 Avascular necrosis of hip ([facility] [address] 08/24/2017 BILAT HIPS  Breast CA ([facility] [address] BILATERAL; SURGERY, CHEMO  Chronic pain  CKD (chronic kidney disease), stage II 8/24/2017  Coagulation disorder ([facility] [address] 1984 ITP  (DR Evans Stewart)  Depression  Diabetes ([facility] [address] TYPE 2; NIDDM  DJD (degenerative joint disease), multiple sites 8/24/2017  GI bleed 2008 HEMORRHOIDS  Hyperlipemia  Hypertension with renal disease 8/24/2017  IBS (irritable bowel syndrome) 8/24/2017  Myalgia 8/24/2017  On statin therapy 8/24/2017  Overactive bladder 8/24/2017  Pneumonia 04/2015 HOSPITALIZED 3 WEEKS.  Polymyalgia rheumatica ([facility] [address] 8/24/2017  Prophylactic antibiotic 8/24/2017  Rosacea Past Surgical History:  
Procedure Laterality Date  BREAST SURGERY PROCEDURE UNLISTED    
 RECONSTRUCTION X2  
 [address]  HX BACK SURGERY  07/16/2018  HX CATARACT REMOVAL Bilateral   
 [address]  HX GI    
 COLONOSCOPY  
 HX HEENT    
 WISDOM TEETH  
 HX HYSTERECTOMY  2000S  
 HX MASTECTOMY  1989  
 bilateral  
 HX MASTECTOMY  2001  HX ORTHOPAEDIC    
 back fusion 2008-LUMBAR, 2018 - thoracic and cervical fusion [address]  TOTAL HIP ARTHROPLASTY Left 11/16/2016 ANTERIOR APPROACH, DR Gwendolyn De La Torre; (POSTOP: STANDS ONE INCH TALLER ON LEFT FOOT)  TOTAL HIP ARTHROPLASTY Right 12/2016 ANTERIOR APPROACH (DR Gwendolyn De La Torre) Social History Tobacco Use  Smoking status: Never Smoker  Smokeless tobacco: Never Used Substance Use Topics  Alcohol use: No  
 Drug use: No  
 
Current Outpatient Medications Medication Sig Dispense Refill  methocarbamol (ROBAXIN) 750 mg tablet Take  by mouth four (4) times daily.  oxazepam (SERAX) 15 mg capsule TAKE 2 CAPSULES AT BEDTIME 60 Cap 2  
 benzonatate (TESSALON) 200 mg capsule Take 200 mg by mouth three (3) times daily as needed for Cough.  diclofenac EC (VOLTAREN) 75 mg EC tablet Take 75 mg by mouth two (2) times a day.     
 loratadine-pseudoephedrine (CLARITIN-D 12 HOUR) 5-120 mg per tablet Take 1 Tab by mouth two (2) times a day.  levocarnitine HCl (ACETYL-L-CARNITINE) Take 500 mg by mouth daily.  metFORMIN ER (GLUCOPHAGE XR) 500 mg tablet Take one tablet in the morning with breakfast and one tablet in the evening with dinner. 270 Tab 3  
 buPROPion XL (WELLBUTRIN XL) 150 mg tablet TAKE 1 TABLET BY MOUTH EVERY DAY 90 Tab 3  
 sertraline (ZOLOFT) 100 mg tablet TAKE 1 TABLET BY MOUTH DAILY 90 Tab 3  pravastatin (PRAVACHOL) 80 mg tablet Take 1 Tab by mouth nightly. 90 Tab 3  
 hydroCHLOROthiazide (HYDRODIURIL) 25 mg tablet TAKE 1 TABLET BY MOUTH DAILY 90 Tab 3  
 multivitamin (ONE A DAY) tablet Take 1 Tab by mouth daily.  mometasone (ELOCON) 0.1 % topical cream Apply  to affected area daily as needed.  azelaic acid (FINACEA) 15 % topical gel Apply  to affected area two (2) times a day.  carvedilol (COREG) 6.25 mg tablet Take 3.125 mg by mouth daily.  clindamycin (CLINDAGEL) 1 % topical gel Apply  to affected area two (2) times a day. use thin film on affected area  levalbuterol (XOPENEX) 1.25 mg/0.5 mL nebu 1.25 mg by Nebulization route as needed. Allergies Allergen Reactions  Statins-Hmg-Coa Reductase Inhibitors Myalgia Myalgia with pravachol and multiple statins  Codeine Rash Rash on thighs  Darvon [Propoxyphene] Other (comments) \"I see worms\"  Pcn [Penicillins] Rash  Sulfa (Sulfonamide Antibiotics) Rash Family History Problem Relation Age of Onset  Heart Disease Mother  Kidney Disease Mother  Lung Disease Mother COPD  Anesth Problems Neg Hx Patient Active Problem List  
 Diagnosis  Primary osteoarthritis involving multiple joints  Hypertension with renal disease  CKD (chronic kidney disease), stage II  
 Controlled type 2 diabetes mellitus with stage 2 chronic kidney disease, with long-term current use of insulin ([address]  Mixed hyperlipidemia  Vitamin D deficiency  IBS (irritable bowel syndrome)  Non-seasonal allergic rhinitis  Class 1 obesity due to excess calories without serious comorbidity with body mass index (BMI) of 33.0 to 33.9 in adult  Medicare annual wellness visit, subsequent  Spinal stenosis, lumbar  Pre-operative cardiovascular examination  Lumbar disc disease  Sleep disturbance  Recurrent depression ([address]  Annual physical exam  
 GI bleed  Overactive bladder  Polymyalgia rheumatica ([address]  Avascular necrosis of hip ([address]  Abnormal LFTs  Rosacea  Anxiety Patient Care Team: 
Frandy Unger MD as PCP - General (Internal Medicine) Geronimo Catalan MD (Orthopedic Surgery) Depression Risk Factor Screening: PHQ over the last two weeks 1/14/2019 Little interest or pleasure in doing things Not at all Feeling down, depressed, irritable, or hopeless Not at all Total Score PHQ 2 0 Alcohol Risk Factor Screening: You do not drink alcohol or very rarely. Functional Ability and Level of Safety:  
 
Fall Risk Fall Risk Assessment, last 12 mths 1/14/2019 Able to walk? Yes Fall in past 12 months? No  
 
 
Hearing Loss  
mild Activities of Daily Living Self-care. ADL Assessment 8/28/2018 Feeding yourself No Help Needed Getting from bed to chair No Help Needed Getting dressed No Help Needed Bathing or showering No Help Needed Walk across the room (includes cane/walker) No Help Needed Using the telphone No Help Needed Taking your medications No Help Needed Preparing meals No Help Needed Managing money (expenses/bills) No Help Needed Moderately strenuous housework (laundry) No Help Needed Shopping for personal items (toiletries/medicines) No Help Needed Shopping for groceries No Help Needed Driving No Help Needed Climbing a flight of stairs No Help Needed Getting to places beyond walking distances No Help Needed Abuse Screen Patient is not abused Social History Social History Narrative  Not on file Review of Systems ROS: 
 
Constitutional: She denies fevers, weight loss, sweats. Eyes: No blurred or double vision. ENT: No difficulty with swallowing, taste, speech or smell. NECK: no stiffness swelling or lymph node enlargement Respiratory: No cough wheezing or shortness of breath. Cardiovascular: Denies chest pain, palpitations, unexplained indigestion or syncope. Breast: She has noted no masses or lumps and no discharge or axillary swelling Gastrointestinal:  No changes in bowel movements, no abdominal pain, no bloating. Genitourinary: No discharge or abnormal bleeding or pain Extremities: No joint pain, stiffness or swelling. Neurological:  No numbness, tingling, burring paresthesias or loss of motor strength. No syncope, dizziness or frequent headache Skin:  No recent rashes or mole changes. Psychiatric/Behavioral:  Negative for depression. The patient is not nervous/anxious. HEMATOLOGIC: no easy bruising or bleeding gums Endocrine: no sweats of urinary frequency or excessive thirst 
 
Physical Examination Evaluation of Cognitive Function: 
Mood/affect:  happy Appearance: age appropriate Family member/caregiver input: none Visit Vitals /80 (BP 1 Location: Left arm, BP Patient Position: Sitting) Pulse (!) 103 Resp 19 Ht 5' 4\" (1.626 m) Wt 192 lb 9.6 oz (87.4 kg) SpO2 97% BMI 33.06 kg/m² Vitals:  
 01/14/19 9782 BP: 114/80 Pulse: (!) 103 Resp: 19 SpO2: 97% Weight: 192 lb 9.6 oz (87.4 kg) Height: 5' 4\" (1.626 m) PainSc:   4 PainLoc: Back PHYSICAL EXAM: 
 
General appearance - alert, well appearing, and in no distress Mental status - alert, oriented to person, place, and time HEENT: 
Ears - bilateral TM's and external ear canals clear Eyes - pupillary responses were normal.  Extraocular muscle function intact. Lids and conjunctiva not injected. Fundoscopic exam revealed sharp disc margins. eye movements intact Pharynx- clear with teeth in good repair. No masses were noted Neck - supple without thyromegaly or burit. No JVD noted Lungs - clear to auscultation and percussion Cardiac- normal rate, regular rhythm without murmurs. PMI not displaced. No gallop, rub or click Breast: deferred to GYN Abdomen - flat, soft, non-tender without palpable organomegaly or mass. No pulsatile mass was felt, and not bruit was heard. Bowel sounds were active  Female - deferred to GYN Rectal - deferred to GYN Extremities -  no clubbing cyanosis or edema. No diabetic foot changes noted. Lymphatics - no palpable lymphadenopathy, no hepatosplenomegaly Peripheral vascular - Dorsalis pedis and posterior tibial pulses felt without difficulty Skin - no rash or unusual mole change noted Neurological - Cranial nerves II-XII grossly intact. Motor strength 5/5. DTR's 2+ and symmetric. Station and gait normal. Normal distal sensation and proprioception all toes both feet. Back exam - full range of motion, no tenderness, palpable spasm or pain on motion Musculoskeletal - no joint tenderness, deformity or swelling Hematologic: no purpura, petechiae or bruising Results for orders placed or performed in visit on 10/15/18 METABOLIC PANEL, COMPREHENSIVE Result Value Ref Range Glucose 153 (H) 65 - 99 mg/dL BUN 21 8 - 27 mg/dL Creatinine 0.86 0.57 - 1.00 mg/dL GFR est non-AA 68 >59 mL/min/1.73 GFR est AA 79 >59 mL/min/1.73  
 BUN/Creatinine ratio 24 12 - 28 Sodium 144 134 - 144 mmol/L Potassium 4.8 3.5 - 5.2 mmol/L Chloride 100 96 - 106 mmol/L  
 CO2 25 20 - 29 mmol/L Calcium 9.9 8.7 - 10.3 mg/dL Protein, total 7.6 6.0 - 8.5 g/dL Albumin 4.6 3.5 - 4.8 g/dL GLOBULIN, TOTAL 3.0 1.5 - 4.5 g/dL A-G Ratio 1.5 1.2 - 2.2 Bilirubin, total 0.5 0.0 - 1.2 mg/dL Alk. phosphatase 121 (H) 39 - 117 IU/L  
 AST (SGOT) 58 (H) 0 - 40 IU/L  
 ALT (SGPT) 50 (H) 0 - 32 IU/L  
LIPID PANEL Result Value Ref Range Cholesterol, total 230 (H) 100 - 199 mg/dL Triglyceride 235 (H) 0 - 149 mg/dL HDL Cholesterol 44 >39 mg/dL VLDL, calculated 47 (H) 5 - 40 mg/dL LDL, calculated 139 (H) 0 - 99 mg/dL CK Result Value Ref Range Creatine Kinase,Total 91 24 - 173 U/L  
HEMOGLOBIN A1C WITH EAG Result Value Ref Range Hemoglobin A1c 6.9 (H) 4.8 - 5.6 % Estimated average glucose 151 mg/dL AMB POC URINALYSIS DIP STICK AUTO W/ MICRO Result Value Ref Range Color (UA POC) Si Colon Clarity (UA POC) Clear Glucose (UA POC) Negative Negative Bilirubin (UA POC) 1+ Negative Ketones (UA POC) Negative Negative Specific gravity (UA POC) 1.025 1.010 - 1.025 Blood (UA POC) Negative Negative pH (UA POC) 6.0 5.0 - 7.0 Protein (UA POC) Trace Negative Urobilinogen (UA POC) 1 mg/dL 0.2 - 1 Nitrites (UA POC) Negative Negative Leukocyte esterase (UA POC) 1+ Negative Epithelial cells (UA POC) Few Mucus (UA POC) 1+ WBCs (UA POC) 0-3 RBCs (UA POC) 3-5 Casts (UA POC) 0 Negative Crystals (UA POC) Negative Negative Clue Cells (UA POC) NEGATIVE Trichomonas (UA POC) Negative Yeast (UA POC) Negative Bacteria (UA POC) None Negative URINE CULT COMMENT (UA POC) Culture Not Indicated AMB POC URINE, MICROALBUMIN, SEMIQUANT (1 RESULT) Result Value Ref Range Microalbumin urine (POC) 50 (A) 0 - 20 MG/L Advice/Referrals/Counseling Education and counseling provided: 
Are appropriate based on today's review and evaluation End-of-Life planning (with patient's consent) Pneumococcal Vaccine Influenza Vaccine Colorectal cancer screening tests Assessment/Plan ASSESSMENT:  
1. Hypertension with renal disease 2.  Controlled type 2 diabetes mellitus with stage 2 chronic kidney disease, with long-term current use of insulin ([address] 3. Mixed hyperlipidemia 4. CKD (chronic kidney disease), stage II 5. Primary osteoarthritis involving multiple joints 6. Class 1 obesity due to excess calories without serious comorbidity with body mass index (BMI) of 33.0 to 33.9 in adult 7. Irritable bowel syndrome, unspecified type 8. Non-seasonal allergic rhinitis, unspecified trigger 9. Vitamin D deficiency 10. Anxiety 11. Polymyalgia rheumatica ([facility] [address] 12. Recurrent depression ([address] 13. Medicare annual wellness visit, subsequent 14. Avascular necrosis of bone of hip, unspecified laterality ([address] Impression 1. Hypertension that is controlled so continue current therapy reviewed with her 2. Diabetes repeat status pending and prior labs reviewed on make adjustments if necessary. 3.  Hyperlipidemia prior lab reviewed and repeat status pending I will adjust if needed. 4.  CKD stage II repeat status is pending 5 DJD that is stable 6. Obesity we discussed diet, exercise and weight reduction with overall health benefit associated with that. 7.  IBS that is stable 8. Allergic rhinitis stable 9. Vitamin D deficiency repeat status is pending 10. Anxiety that is stable 11. History of polymyalgia rheumatica currently in remission 12. Depression that is currently stable taking Zoloft 13. History of avascular necrosis of hip status post surgery with no residual problems 14. DJD with continued back problems she will continue therapy but I suggested with the fatigue that she try to avoid the muscle relaxant 15. History of hair thinning which I think taken Rogaine for Men is reasonable I will call the lab results and make further recommendations or adjustments if necessary. Follow-up as scheduled again for 3 months or sooner should to be a problem.  
Medicare subsequent annual wellness examination screening questionnaire is completed today. The results were reviewed with her and her questions were answered. Lifestyle recommendations and modifications discussed and made. PLAN: 
. Orders Placed This Encounter  T4, FREE  
 METABOLIC PANEL, COMPREHENSIVE  
 TSH 3RD GENERATION  
 LIPID PANEL  
 CK  
 HEMOGLOBIN A1C WITH EAG  
 VITAMIN D, 25 HYDROXY  AMB POC COMPLETE CBC,AUTOMATED ENTER  AMB POC URINALYSIS DIP STICK AUTO W/ MICRO  AMB POC URINE, MICROALBUMIN, SEMIQUANT (1 RESULT)  methocarbamol (ROBAXIN) 750 mg tablet ATTENTION:  
This medical record was transcribed using an electronic medical records system. Although proofread, it may and can contain electronic and spelling errors. Other human spelling and other errors may be present. Corrections may be executed at a later time. Please feel free to contact us for any clarifications as needed. Follow-up Disposition: 
Return in about 3 months (around 4/14/2019). Edwin Chávez MD 
 
Recommended healthy diet low in carbohydrates, fats, sodium and cholesterol. Recommended regular cardiovascular exercise 3-6 times per week for 30-60 minutes daily. Current Outpatient Medications Medication Sig Dispense Refill  methocarbamol (ROBAXIN) 750 mg tablet Take  by mouth four (4) times daily.  oxazepam (SERAX) 15 mg capsule TAKE 2 CAPSULES AT BEDTIME 60 Cap 2  
 benzonatate (TESSALON) 200 mg capsule Take 200 mg by mouth three (3) times daily as needed for Cough.  diclofenac EC (VOLTAREN) 75 mg EC tablet Take 75 mg by mouth two (2) times a day.  loratadine-pseudoephedrine (CLARITIN-D 12 HOUR) 5-120 mg per tablet Take 1 Tab by mouth two (2) times a day.  levocarnitine HCl (ACETYL-L-CARNITINE) Take 500 mg by mouth daily.  metFORMIN ER (GLUCOPHAGE XR) 500 mg tablet Take one tablet in the morning with breakfast and one tablet in the evening with dinner.  270 Tab 3  
  buPROPion XL (WELLBUTRIN XL) 150 mg tablet TAKE 1 TABLET BY MOUTH EVERY DAY 90 Tab 3  
 sertraline (ZOLOFT) 100 mg tablet TAKE 1 TABLET BY MOUTH DAILY 90 Tab 3  pravastatin (PRAVACHOL) 80 mg tablet Take 1 Tab by mouth nightly. 90 Tab 3  
 hydroCHLOROthiazide (HYDRODIURIL) 25 mg tablet TAKE 1 TABLET BY MOUTH DAILY 90 Tab 3  
 multivitamin (ONE A DAY) tablet Take 1 Tab by mouth daily.  mometasone (ELOCON) 0.1 % topical cream Apply  to affected area daily as needed.  azelaic acid (FINACEA) 15 % topical gel Apply  to affected area two (2) times a day.  carvedilol (COREG) 6.25 mg tablet Take 3.125 mg by mouth daily.  clindamycin (CLINDAGEL) 1 % topical gel Apply  to affected area two (2) times a day. use thin film on affected area  levalbuterol (XOPENEX) 1.25 mg/0.5 mL nebu 1.25 mg by Nebulization route as needed. No results found for any visits on 01/14/19. Verbal and written instructions (see AVS) provided. Patient expresses understanding of diagnosis and treatment plan.  
 
Jesus Figueroa MD

## 2019-01-14 ENCOUNTER — OFFICE VISIT (OUTPATIENT)
Dept: INTERNAL MEDICINE CLINIC | Age: 74
End: 2019-01-14

## 2019-01-14 VITALS
DIASTOLIC BLOOD PRESSURE: 80 MMHG | WEIGHT: 192.6 LBS | RESPIRATION RATE: 19 BRPM | HEART RATE: 103 BPM | SYSTOLIC BLOOD PRESSURE: 114 MMHG | HEIGHT: 64 IN | BODY MASS INDEX: 32.88 KG/M2 | OXYGEN SATURATION: 97 %

## 2019-01-14 DIAGNOSIS — Z79.4 CONTROLLED TYPE 2 DIABETES MELLITUS WITH STAGE 2 CHRONIC KIDNEY DISEASE, WITH LONG-TERM CURRENT USE OF INSULIN (HCC): ICD-10-CM

## 2019-01-14 DIAGNOSIS — E11.22 CONTROLLED TYPE 2 DIABETES MELLITUS WITH STAGE 2 CHRONIC KIDNEY DISEASE, WITH LONG-TERM CURRENT USE OF INSULIN (HCC): ICD-10-CM

## 2019-01-14 DIAGNOSIS — N18.2 CKD (CHRONIC KIDNEY DISEASE), STAGE II: ICD-10-CM

## 2019-01-14 DIAGNOSIS — M35.3 POLYMYALGIA RHEUMATICA (HCC): ICD-10-CM

## 2019-01-14 DIAGNOSIS — J30.89 NON-SEASONAL ALLERGIC RHINITIS, UNSPECIFIED TRIGGER: ICD-10-CM

## 2019-01-14 DIAGNOSIS — E78.2 MIXED HYPERLIPIDEMIA: ICD-10-CM

## 2019-01-14 DIAGNOSIS — E55.9 VITAMIN D DEFICIENCY: ICD-10-CM

## 2019-01-14 DIAGNOSIS — K58.9 IRRITABLE BOWEL SYNDROME, UNSPECIFIED TYPE: ICD-10-CM

## 2019-01-14 DIAGNOSIS — Z00.00 MEDICARE ANNUAL WELLNESS VISIT, SUBSEQUENT: ICD-10-CM

## 2019-01-14 DIAGNOSIS — M15.9 PRIMARY OSTEOARTHRITIS INVOLVING MULTIPLE JOINTS: ICD-10-CM

## 2019-01-14 DIAGNOSIS — I12.9 HYPERTENSION WITH RENAL DISEASE: Primary | ICD-10-CM

## 2019-01-14 DIAGNOSIS — F41.9 ANXIETY: ICD-10-CM

## 2019-01-14 DIAGNOSIS — E66.09 CLASS 1 OBESITY DUE TO EXCESS CALORIES WITHOUT SERIOUS COMORBIDITY WITH BODY MASS INDEX (BMI) OF 33.0 TO 33.9 IN ADULT: ICD-10-CM

## 2019-01-14 DIAGNOSIS — N18.2 CONTROLLED TYPE 2 DIABETES MELLITUS WITH STAGE 2 CHRONIC KIDNEY DISEASE, WITH LONG-TERM CURRENT USE OF INSULIN (HCC): ICD-10-CM

## 2019-01-14 DIAGNOSIS — M87.059 AVASCULAR NECROSIS OF BONE OF HIP, UNSPECIFIED LATERALITY (HCC): ICD-10-CM

## 2019-01-14 DIAGNOSIS — F33.9 RECURRENT DEPRESSION (HCC): ICD-10-CM

## 2019-01-14 LAB
BACTERIA UA POCT, BACTPOCT: NORMAL
BILIRUB UR QL STRIP: NORMAL
CASTS UA POCT: NORMAL
CLUE CELLS, CLUEPOCT: NORMAL
CRYSTALS UA POCT, CRYSPOCT: NORMAL
EPITHELIAL CELLS POCT: NORMAL
GLUCOSE UR-MCNC: NORMAL MG/DL
GRAN# POC: 4 K/UL (ref 2–7.8)
GRAN% POC: 72.2 % (ref 37–92)
HCT VFR BLD CALC: 43.5 % (ref 37–51)
HGB BLD-MCNC: 14.9 G/DL (ref 12–18)
KETONES P FAST UR STRIP-MCNC: NORMAL MG/DL
LY# POC: 1.1 K/UL (ref 0.6–4.1)
LY% POC: 22.5 % (ref 10–58.5)
MCH RBC QN: 33.6 PG (ref 26–32)
MCHC RBC-ENTMCNC: 34.2 G/DL (ref 30–36)
MCV RBC: 98 FL (ref 80–97)
MICROALBUMIN UR TEST STR-MCNC: NORMAL MG/L (ref 0–20)
MID #, POC: 0.2 K/UL (ref 0–1.8)
MID% POC: 5.3 % (ref 0.1–24)
MUCUS UA POCT, MUCPOCT: NORMAL
PH UR STRIP: NORMAL [PH] (ref 5–7)
PLATELET # BLD: 261 K/UL (ref 140–440)
PROT UR QL STRIP: NORMAL
RBC # BLD: 4.43 M/UL (ref 4.2–6.3)
RBC UA POCT, RBCPOCT: NORMAL
SP GR UR STRIP: NORMAL (ref 1.01–1.02)
TRICH UA POCT, TRICHPOC: NORMAL
UA UROBILINOGEN AMB POC: NORMAL (ref 0.2–1)
URINALYSIS CLARITY POC: NORMAL
URINALYSIS COLOR POC: NORMAL
URINE BLOOD POC: NORMAL
URINE CULT COMMENT, POCT: NORMAL
URINE LEUKOCYTES POC: NORMAL
URINE NITRITES POC: NORMAL
WBC # BLD: 5.3 K/UL (ref 4.1–10.9)
WBC UA POCT, WBCPOCT: NORMAL
YEAST UA POCT, YEASTPOC: NORMAL

## 2019-01-14 RX ORDER — METHOCARBAMOL 750 MG/1
TABLET, FILM COATED ORAL 4 TIMES DAILY
COMMUNITY
End: 2019-02-19 | Stop reason: ALTCHOICE

## 2019-01-14 NOTE — PROGRESS NOTES
Chief Complaint Patient presents with  Hypertension 3 month follow up  Diabetes  Chronic Kidney Disease  Obesity  Anxiety 1. Have you been to the ER, urgent care clinic since your last visit? Hospitalized since your last visit? No 
 
2. Have you seen or consulted any other health care providers outside of the [address] [place] [address] since your last visit? Include any pap smears or colon screening. No 
Visit Vitals /80 (BP 1 Location: Left arm, BP Patient Position: Sitting) Pulse (!) 103 Resp 19 Ht 5' 4\" (1.626 m) Wt 192 lb 9.6 oz (87.4 kg) SpO2 97% BMI 33.06 kg/m²

## 2019-01-14 NOTE — PATIENT INSTRUCTIONS
Body Mass Index: Care Instructions Your Care Instructions Body mass index (BMI) can help you see if your weight is raising your risk for health problems. It uses a formula to compare how much you weigh with how tall you are. · A BMI lower than 18.5 is considered underweight. · A BMI between 18.5 and 24.9 is considered healthy. · A BMI between 25 and 29.9 is considered overweight. A BMI of 30 or higher is considered obese. If your BMI is in the normal range, it means that you have a lower risk for weight-related health problems. If your BMI is in the overweight or obese range, you may be at increased risk for weight-related health problems, such as high blood pressure, heart disease, stroke, arthritis or joint pain, and diabetes. If your BMI is in the underweight range, you may be at increased risk for health problems such as fatigue, lower protection (immunity) against illness, muscle loss, bone loss, hair loss, and hormone problems. BMI is just one measure of your risk for weight-related health problems. You may be at higher risk for health problems if you are not active, you eat an unhealthy diet, or you drink too much alcohol or use tobacco products. Follow-up care is a key part of your treatment and safety. Be sure to make and go to all appointments, and call your doctor if you are having problems. It's also a good idea to know your test results and keep a list of the medicines you take. How can you care for yourself at home? · Practice healthy eating habits. This includes eating plenty of fruits, vegetables, whole grains, lean protein, and low-fat dairy. · If your doctor recommends it, get more exercise. Walking is a good choice. Bit by bit, increase the amount you walk every day. Try for at least 30 minutes on most days of the week. · Do not smoke. Smoking can increase your risk for health problems.  If you need help quitting, talk to your doctor about stop-smoking programs and medicines. These can increase your chances of quitting for good. · Limit alcohol to 2 drinks a day for men and 1 drink a day for women. Too much alcohol can cause health problems. If you have a BMI higher than 25 · Your doctor may do other tests to check your risk for weight-related health problems. This may include measuring the distance around your waist. A waist measurement of more than 40 inches in men or 35 inches in women can increase the risk of weight-related health problems. · Talk with your doctor about steps you can take to stay healthy or improve your health. You may need to make lifestyle changes to lose weight and stay healthy, such as changing your diet and getting regular exercise. If you have a BMI lower than 18.5 · Your doctor may do other tests to check your risk for health problems. · Talk with your doctor about steps you can take to stay healthy or improve your health. You may need to make lifestyle changes to gain or maintain weight and stay healthy, such as getting more healthy foods in your diet and doing exercises to build muscle. Where can you learn more? Go to http://malia-lincoln.info/. Enter S176 in the search box to learn more about \"Body Mass Index: Care Instructions. \" Current as of: June 26, 2018 Content Version: 11.8 © 1980-1376 [employer]. Care instructions adapted under license by [employer] (which disclaims liability or warranty for this information). If you have questions about a medical condition or this instruction, always ask your healthcare professional. [address] any warranty or liability for your use of this information.

## 2019-01-15 ENCOUNTER — TELEPHONE (OUTPATIENT)
Dept: INTERNAL MEDICINE CLINIC | Age: 74
End: 2019-01-15

## 2019-01-15 LAB
25(OH)D3+25(OH)D2 SERPL-MCNC: 21 NG/ML (ref 30–100)
ALBUMIN SERPL-MCNC: 4.8 G/DL (ref 3.5–4.8)
ALBUMIN/GLOB SERPL: 1.7 {RATIO} (ref 1.2–2.2)
ALP SERPL-CCNC: 134 IU/L (ref 39–117)
ALT SERPL-CCNC: 47 IU/L (ref 0–32)
AST SERPL-CCNC: 43 IU/L (ref 0–40)
BILIRUB SERPL-MCNC: 0.6 MG/DL (ref 0–1.2)
BUN SERPL-MCNC: 21 MG/DL (ref 8–27)
BUN/CREAT SERPL: 23 (ref 12–28)
CALCIUM SERPL-MCNC: 9.7 MG/DL (ref 8.7–10.3)
CHLORIDE SERPL-SCNC: 96 MMOL/L (ref 96–106)
CHOLEST SERPL-MCNC: 225 MG/DL (ref 100–199)
CK SERPL-CCNC: 86 U/L (ref 24–173)
CO2 SERPL-SCNC: 26 MMOL/L (ref 20–29)
CREAT SERPL-MCNC: 0.91 MG/DL (ref 0.57–1)
EST. AVERAGE GLUCOSE BLD GHB EST-MCNC: 166 MG/DL
GLOBULIN SER CALC-MCNC: 2.8 G/DL (ref 1.5–4.5)
GLUCOSE SERPL-MCNC: 191 MG/DL (ref 65–99)
HBA1C MFR BLD: 7.4 % (ref 4.8–5.6)
HDLC SERPL-MCNC: 43 MG/DL
LDLC SERPL CALC-MCNC: 134 MG/DL (ref 0–99)
POTASSIUM SERPL-SCNC: 4.5 MMOL/L (ref 3.5–5.2)
PROT SERPL-MCNC: 7.6 G/DL (ref 6–8.5)
SODIUM SERPL-SCNC: 141 MMOL/L (ref 134–144)
T4 FREE SERPL-MCNC: 1.4 NG/DL (ref 0.82–1.77)
TRIGL SERPL-MCNC: 238 MG/DL (ref 0–149)
TSH SERPL DL<=0.005 MIU/L-ACNC: 3.13 UIU/ML (ref 0.45–4.5)
VLDLC SERPL CALC-MCNC: 48 MG/DL (ref 5–40)

## 2019-01-15 RX ORDER — FENOFIBRATE 145 MG/1
145 TABLET, COATED ORAL DAILY
Qty: 30 TAB | Refills: 11 | Status: SHIPPED | OUTPATIENT
Start: 2019-01-15 | End: 2020-01-15

## 2019-01-15 NOTE — TELEPHONE ENCOUNTER
Discussed labs with patient. States she has only been taking Metformin 500 mg in the am not bid as prescribed. Advised to increase to twice a day as prescribed. Also triglycerides elevated as well as low HDL. Dr. Dante Davis recommends adding Tricor 145 mg daily. Rx sent to patient's pharmacy. Advised to work on diet and exercise.

## 2019-01-15 NOTE — TELEPHONE ENCOUNTER
RX refill request from the patient/pharmacy. Patient last seen 01- with labs, and next appt. scheduled for 04-  Requested Prescriptions     Pending Prescriptions Disp Refills    fenofibrate nanocrystallized (TRICOR) 145 mg tablet 30 Tab 11     Sig: Take 1 Tab by mouth daily. Mateo Dye

## 2019-01-15 NOTE — TELEPHONE ENCOUNTER
----- Message from Clifton Krabbe, MD sent at 1/15/2019 12:09 PM EST -----  Blood sugar and glycohemoglobin are elevated as is the triglyceride level. Current dose of metformin is 500 mg twice daily so would increase it to 500 mg in the morning and thousand evening. With the triglyceride level elevated as well as the LDL and a low HDL for female I would add TriCor 145 daily. Continue to work on diet, exercise and weight reduction.

## 2019-01-15 NOTE — PROGRESS NOTES
Blood sugar and glycohemoglobin are elevated as is the triglyceride level. Current dose of metformin is 500 mg twice daily so would increase it to 500 mg in the morning and thousand evening. With the triglyceride level elevated as well as the LDL and a low HDL for female I would add TriCor 145 daily. Continue to work on diet, exercise and weight reduction.

## 2019-01-17 DIAGNOSIS — I10 HYPERTENSION, UNSPECIFIED TYPE: ICD-10-CM

## 2019-01-18 RX ORDER — HYDROCHLOROTHIAZIDE 25 MG/1
TABLET ORAL
Qty: 90 TAB | Refills: 3 | Status: SHIPPED | OUTPATIENT
Start: 2019-01-18 | End: 2020-04-10

## 2019-01-18 NOTE — TELEPHONE ENCOUNTER
RX refill request from the patient/pharmacy. Patient last seen 01- with labs, and next appt. scheduled for 04-  Requested Prescriptions     Pending Prescriptions Disp Refills    hydroCHLOROthiazide (HYDRODIURIL) 25 mg tablet [Pharmacy Med Name: *HYDROCHLOROTHIAZIDE  25MG] 90 Tab 3     Sig: TAKE 1 TABLET EVERY DAY   .

## 2019-02-11 RX ORDER — BLOOD-GLUCOSE METER
EACH MISCELLANEOUS
Qty: 1 EACH | Refills: 0 | Status: ON HOLD | OUTPATIENT
Start: 2019-02-11 | End: 2019-03-26

## 2019-02-19 ENCOUNTER — OFFICE VISIT (OUTPATIENT)
Dept: INTERNAL MEDICINE CLINIC | Age: 74
End: 2019-02-19

## 2019-02-19 VITALS
OXYGEN SATURATION: 96 % | DIASTOLIC BLOOD PRESSURE: 98 MMHG | BODY MASS INDEX: 32.4 KG/M2 | HEART RATE: 92 BPM | HEIGHT: 64 IN | SYSTOLIC BLOOD PRESSURE: 138 MMHG | RESPIRATION RATE: 18 BRPM | WEIGHT: 189.8 LBS | TEMPERATURE: 98.5 F

## 2019-02-19 DIAGNOSIS — J06.9 UPPER RESPIRATORY TRACT INFECTION, UNSPECIFIED TYPE: ICD-10-CM

## 2019-02-19 DIAGNOSIS — N18.2 CONTROLLED TYPE 2 DIABETES MELLITUS WITH STAGE 2 CHRONIC KIDNEY DISEASE, WITH LONG-TERM CURRENT USE OF INSULIN (HCC): ICD-10-CM

## 2019-02-19 DIAGNOSIS — R06.09 DYSPNEA ON EXERTION: Primary | ICD-10-CM

## 2019-02-19 DIAGNOSIS — E11.22 CONTROLLED TYPE 2 DIABETES MELLITUS WITH STAGE 2 CHRONIC KIDNEY DISEASE, WITH LONG-TERM CURRENT USE OF INSULIN (HCC): ICD-10-CM

## 2019-02-19 DIAGNOSIS — I12.9 HYPERTENSION WITH RENAL DISEASE: ICD-10-CM

## 2019-02-19 DIAGNOSIS — R05.9 COUGH: ICD-10-CM

## 2019-02-19 DIAGNOSIS — Z79.4 CONTROLLED TYPE 2 DIABETES MELLITUS WITH STAGE 2 CHRONIC KIDNEY DISEASE, WITH LONG-TERM CURRENT USE OF INSULIN (HCC): ICD-10-CM

## 2019-02-19 LAB
ANION GAP SERPL CALC-SCNC: 13 MMOL/L
BUN SERPL-MCNC: 32 MG/DL (ref 7–17)
CALCIUM SERPL-MCNC: 11.2 MG/DL (ref 8.4–10.2)
CHLORIDE SERPL-SCNC: 98 MMOL/L (ref 98–107)
CO2 SERPL-SCNC: 30 MMOL/L (ref 22–32)
CREAT SERPL-MCNC: 1 MG/DL (ref 0.7–1.2)
ERYTHROCYTE [DISTWIDTH] IN BLOOD BY AUTOMATED COUNT: 13.8 %
GLUCOSE SERPL-MCNC: 190 MG/DL (ref 65–105)
HCT VFR BLD AUTO: 46 % (ref 37–51)
HGB BLD-MCNC: 15 G/DL (ref 12–18)
LYMPHOCYTES ABSOLUTE: 1.5 K/UL (ref 0.6–4.1)
LYMPHOCYTES NFR BLD: 21.3 % (ref 10–58.5)
MCH RBC QN AUTO: 33.3 PG (ref 26–32)
MCHC RBC AUTO-ENTMCNC: 32.6 G/DL (ref 30–36)
MCV RBC AUTO: 102.2 FL (ref 80–97)
MONOCYTES ABS-DIF,2141: 0.5 K/UL (ref 0–1.8)
MONOCYTES NFR BLD: 6.5 % (ref 0.1–24)
NEUTROPHILS # BLD: 72.2 % (ref 37–92)
NEUTROPHILS ABS,2156: 5.2 K/UL (ref 2–7.8)
PLATELET # BLD AUTO: 395 K/UL (ref 140–440)
PMV BLD AUTO: 7.7 FL
POTASSIUM SERPL-SCNC: 4.1 MMOL/L (ref 3.6–5)
RBC # BLD AUTO: 4.5 M/UL (ref 4.2–6.3)
SODIUM SERPL-SCNC: 141 MMOL/L (ref 137–145)
WBC # BLD AUTO: 7.2 K/UL (ref 4.1–10.9)

## 2019-02-19 RX ORDER — AZITHROMYCIN 250 MG/1
250 TABLET, FILM COATED ORAL SEE ADMIN INSTRUCTIONS
Qty: 6 TAB | Refills: 0 | Status: SHIPPED | OUTPATIENT
Start: 2019-02-19 | End: 2019-02-24

## 2019-02-19 NOTE — PROGRESS NOTES
Identified pt with two pt identifiers(name and ). Reviewed record in preparation for visit and have obtained necessary documentation. Chief Complaint Patient presents with  Blood sugar problem Health Maintenance Due Topic  DTaP/Tdap/Td series (1 - Tdap)  Shingrix Vaccine Age 50> (1 of 2) Coordination of Care Questionnaire: 
:  
1) Have you been to an emergency room, urgent care, or hospitalized since your last visit?   no If yes, where when, and reason for visit? Dr. Hussein Juarez for cervical compression 2/15/19. 
2. Have seen or consulted any other health care provider since your last visit If yes, where when, and reason for visit? 3) Do you have an Advanced Directive/ Living Will in place? YES If yes, do we have a copy on file YES If no, would you like information NO Patient is accompanied by self I have received verbal consent from Edd Velazquez to discuss any/all medical information while they are present in the room.

## 2019-02-19 NOTE — PROGRESS NOTES
Subjective:  
Simona Vanessa is a 69 y.o. female Chief Complaint Patient presents with  Blood sugar problem History of present illness: She presents today complaining of an acute problem of just not feeling well. To be more specific she notes that her blood sugars jumped up to 200 yesterday and today which he normally does not run that high. She also gives a history that she is recently had shortness of breath with exertion when she is walking at the office. She has had a cough and some congestion but is not getting any sputum. She denies any fevers or chills. She denies any chest pain, chest tightness, palpitations, PND, orthopnea or diaphoresis associated dyspnea on exertion. She denies any change in bowel habits or GI or  complaints. She denies any headaches, dizziness other than some unsteadiness for which she has been getting physical therapy and was recently referred to an orthopedist for evaluation of neck discomfort and is due to have an MRI to evaluate for spinal stenosis. There are no other specific complaints noted. Patient Active Problem List  
Diagnosis Code  Controlled type 2 diabetes mellitus with stage 2 chronic kidney disease, with long-term current use of insulin ([facility]) E11.22, N18.2, Z79.4  Non-seasonal allergic rhinitis J30.89  Class 1 obesity due to excess calories without serious comorbidity with body mass index (BMI) of 33.0 to 33.9 in adult E66.09, Z68.33  
 Rosacea L71.9  Mixed hyperlipidemia E78.2  Anxiety F41.9  GI bleed K92.2  Overactive bladder N32.81  
 Polymyalgia rheumatica ([facility]) M35.3  
 IBS (irritable bowel syndrome) K58.9  Hypertension with renal disease I12.9  CKD (chronic kidney disease), stage II N18.2  Avascular necrosis of hip ([facility] [address] M87.059  Abnormal LFTs R94.5  Annual physical exam Z00.00  Vitamin D deficiency E55.9  Recurrent depression ([facility] [address] F33.9  Primary osteoarthritis involving multiple joints M15.0  Sleep disturbance G47.9  Pre-operative cardiovascular examination Z01.810  Lumbar disc disease M51.9  Spinal stenosis, lumbar M48.061  Medicare annual wellness visit, subsequent Z00.00  Dyspnea on exertion R06.09  
 URI (upper respiratory infection) J06.9 Past Medical History:  
Diagnosis Date  Abnormal LFTs 08/24/2017 FATTY LIVER  Arthritis OSTEO  
 Avascular necrosis of hip ([facility] [address] 08/24/2017 BILAT HIPS  Breast CA ([facility] [address] BILATERAL; SURGERY, CHEMO  Chronic pain  CKD (chronic kidney disease), stage II 8/24/2017  Coagulation disorder ([facility] [address] 1984 ITP  (DR Carmelo Bojorquez)  Depression  Diabetes ([facility] [address] TYPE 2; NIDDM  DJD (degenerative joint disease), multiple sites 8/24/2017  GI bleed 2008 HEMORRHOIDS  Hyperlipemia  Hypertension with renal disease 8/24/2017  IBS (irritable bowel syndrome) 8/24/2017  Myalgia 8/24/2017  On statin therapy 8/24/2017  Overactive bladder 8/24/2017  Pneumonia 04/2015 HOSPITALIZED 3 WEEKS.  Polymyalgia rheumatica ([facility] [address] 8/24/2017  Prophylactic antibiotic 8/24/2017  Rosacea Allergies Allergen Reactions  Statins-Hmg-Coa Reductase Inhibitors Myalgia Myalgia with pravachol and multiple statins  Codeine Rash Rash on thighs  Darvon [Propoxyphene] Other (comments) \"I see worms\"  Pcn [Penicillins] Rash  Sulfa (Sulfonamide Antibiotics) Rash Family History Problem Relation Age of Onset  Heart Disease Mother  Kidney Disease Mother  Lung Disease Mother COPD  Anesth Problems Neg Hx Social History Socioeconomic History  Marital status:  Spouse name: Not on file  Number of children: Not on file  Years of education: Not on file  Highest education level: Not on file Social Needs  Financial resource strain: Not on file  Food insecurity - worry: Not on file  Food insecurity - inability: Not on file  Transportation needs - medical: Not on file  Transportation needs - non-medical: Not on file Occupational History  Not on file Tobacco Use  Smoking status: Never Smoker  Smokeless tobacco: Never Used Substance and Sexual Activity  Alcohol use: No  
 Drug use: No  
 Sexual activity: No  
Other Topics Concern  Not on file Social History Narrative  Not on file Prior to Admission medications Medication Sig Start Date End Date Taking? Authorizing Provider  
azithromycin (ZITHROMAX) 250 mg tablet Take 1 Tab by mouth See Admin Instructions for 5 days. Take 2 tablets the first day, then 1 tablet daily for the next four days. 2/19/19 2/24/19 Yes Brennan Graf MD  
glucose blood VI test strips (ACCU-CHEK CHERYL) strip Use once daily 2/11/19  Yes Brennan Graf MD  
Blood-Glucose Meter (ACCU-CHEK CHERYL PLUS METER) misc Use once daily with strips for glucose testing. 2/11/19  Yes Brennan Graf MD  
hydroCHLOROthiazide (HYDRODIURIL) 25 mg tablet TAKE 1 TABLET EVERY DAY 1/18/19  Yes Brennan Graf MD  
fenofibrate nanocrystallized (TRICOR) 145 mg tablet Take 1 Tab by mouth daily. 1/15/19  Yes Brennan Graf MD  
[facility] 15 mg capsule TAKE 2 CAPSULES AT BEDTIME 11/21/18  Yes Brennan Graf MD  
benzonatate (TESSALON) 200 mg capsule Take 200 mg by mouth three (3) times daily as needed for Cough. Yes Provider, Historical  
diclofenac EC (VOLTAREN) 75 mg EC tablet Take 75 mg by mouth two (2) times a day. Yes Provider, Historical  
loratadine-pseudoephedrine (CLARITIN-D 12 HOUR) 5-120 mg per tablet Take 1 Tab by mouth two (2) times a day. Yes Provider, Historical  
levocarnitine HCl (ACETYL-L-CARNITINE) Take 500 mg by mouth daily.    Yes Provider, Historical  
metFORMIN ER (GLUCOPHAGE XR) 500 mg tablet Take one tablet in the morning with breakfast and one tablet in the evening with dinner. 7/11/18  Yes Josselyn Heller MD  
buPROPion XL (WELLBUTRIN XL) 150 mg tablet TAKE 1 TABLET BY MOUTH EVERY DAY 7/6/18  Yes Josselyn Heller MD  
sertraline (ZOLOFT) 100 mg tablet TAKE 1 TABLET BY MOUTH DAILY 7/6/18  Yes Josselyn Heller MD  
pravastatin (PRAVACHOL) 80 mg tablet Take 1 Tab by mouth nightly. 1/22/18  Yes Josselyn Heller MD  
levalbuterol (XOPENEX) 1.25 mg/0.5 mL nebu 1.25 mg by Nebulization route as needed. Yes Provider, Historical  
multivitamin (ONE A DAY) tablet Take 1 Tab by mouth daily. Yes Provider, Historical  
mometasone (ELOCON) 0.1 % topical cream Apply  to affected area daily as needed. Yes Provider, Historical  
azelaic acid (FINACEA) 15 % topical gel Apply  to affected area two (2) times a day. Yes Provider, Historical  
carvedilol (COREG) 6.25 mg tablet Take 3.125 mg by mouth daily. Yes Provider, Historical  
clindamycin (CLINDAGEL) 1 % topical gel Apply  to affected area two (2) times a day. use thin film on affected area   Yes Provider, Historical  
  
 
Review of Systems Constitutional:  She denies fever, weight loss, sweats or fatigue. Just does not feel well. EYES: No blurred or double vision, ENT: no nasal congestion, no headache or dizziness. No difficulty with swallowing, taste, speech or smell. Respiratory: A dry nonproductive cough wheezing or shortness of breath. No sputum production. Cardiac:  Denies chest pain, palpitations, unexplained indigestion, syncope, edema, PND or orthopnea. She has developed dyspnea on exertion over the last couple of days GI:  No changes in bowel movements, no abdominal pain, no bloating, anorexia, nausea, vomiting or heartburn. :  No frequency or dysuria. Denies incontinence or sexual dysfunction. Extremities:  No joint pain, stiffness or swelling Back:.no pain or soreness Skin:  No recent rashes or mole changes. Neurological:  No numbness, tingling, burning paresthesias or loss of motor strength. No syncope, dizziness, frequent headaches or memory loss. Hematologic:  No easy bruising Lymphatic: No lymph node enlargement Objective:  
 
Vitals:  
 02/19/19 1612 BP: (!) 138/98 Pulse: 92 Resp: 18 Temp: 98.5 °F (36.9 °C) TempSrc: Oral  
SpO2: 96% Weight: 189 lb 12.8 oz (86.1 kg) Height: 5' 4\" (1.626 m) PainSc:   0 - No pain Body mass index is 32.58 kg/m². Physical Examination:  
           General Appearance:  Well-developed, well-nourished, no acute distress. HEENT:   
  Ears:  The TMs and ear canals were clear. Eyes:  The pupillary responses were normal.  Extraocular muscle function intact. Lids and conjunctiva not injected. Funduscopic exam revealed sharp disc margins. Nares: Clear w/o edema or erythema Pharynx:  Clear with teeth in good repair. No masses were noted. Neck:  Supple without thyromegaly or adenopathy. No JVD noted. No carotid                bruits. Lungs:  Clear to auscultation and percussion. Cardiac:  Regular rate and rhythm without murmur. PMI not displaced. No gallop, rub or click. Abdominal: Soft, non-tender, no hepata-spleenomegally or masses Extremities:  No clubbing, cyanosis or edema. Skin:  No rash or unusual mole changes noted. Lymph Nodes:  None felt in the cervical, supraclavicular, axillary or inguinal region. Neurological: . DTRs 2+ and symmetric. Station and gait normal.  
Hematologic:   No purpura or petechiae Assessment/Plan: 1. Dyspnea on exertion 2. Cough 3. Controlled type 2 diabetes mellitus with stage 2 chronic kidney disease, with long-term current use of insulin ([address] 4. Hypertension with renal disease 5. Upper respiratory tract infection, unspecified type Impressions/Plan: 
Impression 1. Dyspnea on exertion EKG obtained today 2.  Cough chest x-ray obtained today some chronic problems but no acute process so I think we are probably dealing with a acute upper respiratory infection 3. Diabetes blood sugars have been running high in the 200 range the last 2 days according to her and normally do not run that high but she has no symptoms consistent with infection other than a nonproductive cough. 4.  Hypertension that is not controlled currently Impression I think this is probably all related to a upper respiratory infection I do not see anything to indicate otherwise and she has no fevers or chills or myalgias to indicate flu so we will treat with Zithromax but she is to notify me if things change. Orders Placed This Encounter  XR CHEST PA LAT  CBC WITH AUTOMATED DIFF (Digital Perception In-House)  METABOLIC PANEL, BASIC (Digital Perception In-House)  AMB POC EKG ROUTINE W/ 12 LEADS, INTER & REP  
 azithromycin (ZITHROMAX) 250 mg tablet Follow-up Disposition: 
Return TBD. Results for orders placed or performed in visit on 02/19/19 CBC WITH AUTOMATED DIFF Result Value Ref Range WBC 7.2 4.1 - 10.9 K/uL  
 RBC 4.50 4. 20 - 6.30 M/uL  
 HGB 15.0 12.0 - 18.0 g/dL HCT 46.0 37.0 - 51.0 % MCH 33.3 (H) 26.0 - 32.0 pg  
 MCHC 32.6 30.0 - 36.0 g/dL .2 (H) 80.0 - 97.0 fL  
 RDW 13.8 % PLATELET 899.5 754.0 - 440.0 K/uL MEAN PLATELET VOLUME 7.7 fL Neutrophils abs 5.2 2.0 - 7.8 K/uL Neutrophils 72.2 37.0 - 92.0 % Monocytes abs-DIF 0.5 0.0 - 1.8 K/uL MONOCYTES 6.5 0.1 - 24.0 % Lymphocytes Absolute 1.5 0.6 - 4.1 K/uL LYMPHOCYTES 21.3 10.0 - 58.5 % Vaishnavi Tan MD 
 
The patient was given after the visit summary the patient verbalized an understanding of the plans and problems as explained.

## 2019-02-19 NOTE — PATIENT INSTRUCTIONS
Learning About Diabetes and Coronary Artery Disease How are diabetes and heart disease connected? Many people think diabetes and heart disease go hand in hand. But having diabetes doesn't have to mean that you are going to have a heart attack someday. Healthy living can help prevent many of the problems that come with both diabetes and heart disease. For some people, diabetes can cause problems in your body that may lead to heart disease. Diabetes can make the problems of heart disease worse. Experts do not fully understand how diabetes affects the heart. Many things can lead to heart disease, including high blood sugar, insulin resistance, high cholesterol, and high blood pressure. But lifestyle and genetics may also affect a person's risk. But here's the good news: The good things you're doing to stay healthy with diabeteseating healthy foods, quitting smoking, getting exercise and moreare also helping your heart. What increases your risk for heart disease? When you have diabetes, your risk for heart disease is even higher if you have: 
· High blood pressure, which pushes blood through the arteries with too much force. Over time, this damages the walls of the arteries. · High cholesterol, which causes the buildup of a kind of fat inside the blood vessel walls. This buildup can lower blood flow to the heart muscle and raise your risk for having a heart attack. · Kidney damage, which shares many of the risk factors for heart disease (such as high blood sugar, high blood pressure, and high cholesterol). How can you keep your heart healthy when you have diabetes? Managing your diabetes and keeping your heart healthy are two sides of the same coin. Here are some things you can do. · Test your blood sugar levels and get your diabetes tests on schedule. Try to keep your numbers within your target range. · Keep track of your blood pressure.  Your doctor will give you a goal that's right for you. If your blood pressure is high, your treatment may also include medicine. Changes in your lifestyle, such as staying at a healthy weight, may also help you lower your blood pressure. · Eat heart-healthy foods. These include fruits, vegetables, whole grains, fish, and low-fat or nonfat dairy foods. Limit sodium, alcohol, and sweets. · If your doctor recommends it, get more exercise. Walking is a good choice. Bit by bit, increase the amount you walk every day. Try for at least 30 minutes on most days of the week. · Do not smoke. Smoking can make diabetes and heart disease worse. If you need help quitting, talk to your doctor about stop-smoking programs and medicines. These can increase your chances of quitting for good. · Your doctor may talk with you about taking medicines for your heart. For example, your doctor may suggest taking a statin or daily aspirin. Where can you learn more? Go to http://malia-lincoln.info/. Enter L154 in the search box to learn more about \"Learning About Diabetes and Coronary Artery Disease. \" Current as of: July 25, 2018 Content Version: 11.9 © 2136-4374 [employer]. Care instructions adapted under license by [employer] (which disclaims liability or warranty for this information). If you have questions about a medical condition or this instruction, always ask your healthcare professional. [address] any warranty or liability for your use of this information.

## 2019-02-21 ENCOUNTER — HOSPITAL ENCOUNTER (OUTPATIENT)
Dept: MRI IMAGING | Age: 74
Discharge: HOME | End: 2019-02-21
Attending: ORTHOPAEDIC SURGERY
Payer: COMMERCIAL

## 2019-02-21 DIAGNOSIS — M48.062 SPINAL STENOSIS OF LUMBAR REGION WITH NEUROGENIC CLAUDICATION: ICD-10-CM

## 2019-02-21 DIAGNOSIS — R26.81 UNSTEADY GAIT: ICD-10-CM

## 2019-02-21 DIAGNOSIS — Z98.1 S/P LUMBAR FUSION: ICD-10-CM

## 2019-02-21 PROCEDURE — 72141 MRI NECK SPINE W/O DYE: CPT

## 2019-03-04 PROBLEM — M48.02 SPINAL STENOSIS, CERVICAL REGION: Status: ACTIVE | Noted: 2019-03-04

## 2019-03-04 NOTE — PROGRESS NOTES
Pre-op Exam (neck surgery 3-27-19 with Dr. Herminia Oates); Cough; and Mouth Lesions HPI:  
 
Melecio Romero is a 69y.o. year old female who is here for preoperative cardiovascular examination medical clearance prior to planned anterior cervical discectomy and question fusion C5-C6 and C7 to be done at [facility] by Dr. Jen George on 3/27/2019. She is regular followed by me for hypertension, diabetes, hyperlipidemia, IBS, obesity, DJD and other medical problems. She does note some pain in her neck with some weakness in the legs and some difficulty with ambulation which possibly could be related to her neck although is unclear. She does note some intermittent right-sided chest pain when she coughs or takes a deep breath but is not persistent and it has been going on and off for years without change. Previous chest x-rays have revealed no abnormality. She notes no shortness of breath, palpitations, PND, orthopnea or other cardiorespiratory complaints. She does note a problem with a fever blister that has recently appeared which she has had those intermittently before. She denies any GI or  complaints. She denies any headaches, dizziness or neurologic complaints except for the difficulty with walking that she has had since the neck pain is been present. Other than the neck pain she notes no other arthritic complaints. Visit Vitals /88 (BP 1 Location: Left arm, BP Patient Position: Sitting) Pulse 90 Temp 99 °F (37.2 °C) (Oral) Resp 18 Ht 5' 4\" (1.626 m) Wt 191 lb 6.4 oz (86.8 kg) SpO2 96% BMI 32.85 kg/m² Past Medical History:  
Diagnosis Date  Abnormal LFTs 08/24/2017 FATTY LIVER  Arthritis OSTEO  
 Avascular necrosis of hip ([address] 08/24/2017 BILAT HIPS  Breast CA ([address] BILATERAL; SURGERY, CHEMO  Chronic pain  CKD (chronic kidney disease), stage II 8/24/2017  Coagulation disorder ([facility] 1984 ITP  (DR Shruti Grimes)  Depression  Diabetes ([facility] TYPE 2; NIDDM  DJD (degenerative joint disease), multiple sites 8/24/2017  GI bleed 2008 HEMORRHOIDS  Hyperlipemia  Hypertension with renal disease 8/24/2017  IBS (irritable bowel syndrome) 8/24/2017  Myalgia 8/24/2017  On statin therapy 8/24/2017  Overactive bladder 8/24/2017  Pneumonia 04/2015 HOSPITALIZED 3 WEEKS.  Polymyalgia rheumatica ([facility] 8/24/2017  Prophylactic antibiotic 8/24/2017  Rosacea Past Surgical History:  
Procedure Laterality Date  BREAST SURGERY PROCEDURE UNLISTED    
 RECONSTRUCTION X2  
 [address]  HX BACK SURGERY  07/16/2018  HX CATARACT REMOVAL Bilateral   
 [address]  HX GI    
 COLONOSCOPY  
 HX HEENT    
 WISDOM TEETH  
 HX HYSTERECTOMY  [address]  
 HX MASTECTOMY  1989  
 bilateral  
 HX MASTECTOMY  2001  HX ORTHOPAEDIC    
 back fusion 2008-LUMBAR, 2018 - thoracic and cervical fusion [facility]  TOTAL HIP ARTHROPLASTY Left 11/16/2016 ANTERIOR APPROACH, DR Gwendolyn De La Torre; (POSTOP: STANDS ONE INCH TALLER ON LEFT FOOT)  TOTAL HIP ARTHROPLASTY Right 12/2016 ANTERIOR APPROACH (DR Gwendolyn De La Torre) Prior to Admission medications Medication Sig Start Date End Date Taking? Authorizing Provider  
valACYclovir (VALTREX) 1 gram tablet Take 1 Tab by mouth two (2) times a day. 3/5/19  Yes John Martínez MD  
glucose blood VI test strips (ACCU-CHEK CHERYL) strip Use once daily 2/11/19  Yes John Martínez MD  
Blood-Glucose Meter (ACCU-CHEK CHERYL PLUS METER) misc Use once daily with strips for glucose testing. 2/11/19  Yes John Martínez MD  
hydroCHLOROthiazide (HYDRODIURIL) 25 mg tablet TAKE 1 TABLET EVERY DAY 1/18/19  Yes John Martínez MD  
fenofibrate nanocrystallized (TRICOR) 145 mg tablet Take 1 Tab by mouth daily.  1/15/19  Yes John Martínez MD  
[facility]) 15 mg capsule TAKE 2 CAPSULES AT BEDTIME 11/21/18  Yes John Martínez MD  
 benzonatate (TESSALON) 200 mg capsule Take 200 mg by mouth three (3) times daily as needed for Cough. Yes Provider, Historical  
diclofenac EC (VOLTAREN) 75 mg EC tablet Take 75 mg by mouth two (2) times a day. Yes Provider, Historical  
loratadine-pseudoephedrine (CLARITIN-D 12 HOUR) 5-120 mg per tablet Take 1 Tab by mouth two (2) times a day. Yes Provider, Historical  
levocarnitine HCl (ACETYL-L-CARNITINE) Take 500 mg by mouth daily. Yes Provider, Historical  
metFORMIN ER (GLUCOPHAGE XR) 500 mg tablet Take one tablet in the morning with breakfast and one tablet in the evening with dinner. 7/11/18  Yes Ricky Rubi MD  
buPROPion XL (WELLBUTRIN XL) 150 mg tablet TAKE 1 TABLET BY MOUTH EVERY DAY 7/6/18  Yes Ricky Rubi MD  
sertraline (ZOLOFT) 100 mg tablet TAKE 1 TABLET BY MOUTH DAILY 7/6/18  Yes Ricky Rubi MD  
pravastatin (PRAVACHOL) 80 mg tablet Take 1 Tab by mouth nightly. 1/22/18  Yes Ricky Rubi MD  
levalbuterol (XOPENEX) 1.25 mg/0.5 mL nebu 1.25 mg by Nebulization route as needed. Yes Provider, Historical  
multivitamin (ONE A DAY) tablet Take 1 Tab by mouth daily. Yes Provider, Historical  
mometasone (ELOCON) 0.1 % topical cream Apply  to affected area daily as needed. Yes Provider, Historical  
azelaic acid (FINACEA) 15 % topical gel Apply  to affected area two (2) times a day. Yes Provider, Historical  
carvedilol (COREG) 6.25 mg tablet Take 3.125 mg by mouth daily. Yes Provider, Historical  
clindamycin (CLINDAGEL) 1 % topical gel Apply  to affected area two (2) times a day. use thin film on affected area   Yes Provider, Historical  
  
 
Allergies Allergen Reactions  Statins-Hmg-Coa Reductase Inhibitors Myalgia Myalgia with pravachol and multiple statins  Codeine Rash Rash on thighs  Darvon [Propoxyphene] Other (comments) \"I see worms\"  Pcn [Penicillins] Rash  Sulfa (Sulfonamide Antibiotics) Rash Family History Problem Relation Age of Onset  Heart Disease Mother  Kidney Disease Mother  Lung Disease Mother COPD  Anesth Problems Neg Hx Social History Socioeconomic History  Marital status:  Spouse name: Not on file  Number of children: Not on file  Years of education: Not on file  Highest education level: Not on file Social Needs  Financial resource strain: Not on file  Food insecurity - worry: Not on file  Food insecurity - inability: Not on file  Transportation needs - medical: Not on file  Transportation needs - non-medical: Not on file Occupational History  Not on file Tobacco Use  Smoking status: Never Smoker  Smokeless tobacco: Never Used Substance and Sexual Activity  Alcohol use: No  
 Drug use: No  
 Sexual activity: No  
Other Topics Concern  Not on file Social History Narrative  Not on file ROS:  
 
Constitutional: She denies fevers, weight loss, sweats. Eyes: No blurred or double vision. ENT: No difficulty with swallowing, taste, speech or smell. Fever blister left upper lip NECK: no stiffness swelling or lymph node enlargement but pain is noted Respiratory: No cough wheezing or shortness of breath. Intermittent right pleuritic chest pain 
Cardiovascular: Denies chest pain, palpitations, unexplained indigestion or syncope. Breast: She has noted no masses or lumps and no discharge or axillary swelling Gastrointestinal:  No changes in bowel movements, no abdominal pain, no bloating. Genitourinary: No discharge or abnormal bleeding or pain Extremities: No joint pain, stiffness or swelling. Neurological:  No numbness, tingling, burring paresthesias or loss of motor strength. No syncope, dizziness or frequent headache. Some gait disturbance which she thinks is related to the neck Skin:  No recent rashes or mole changes. Psychiatric/Behavioral:  Negative for depression.   The patient is not nervous/anxious. HEMATOLOGIC: no easy bruising or bleeding gums Endocrine: no sweats of urinary frequency or excessive thirst 
 
 
Physical Examination:  
 
Vitals:  
 03/05/19 1429 BP: 136/88 Pulse: 90 Resp: 18 Temp: 99 °F (37.2 °C) TempSrc: Oral  
SpO2: 96% Weight: 191 lb 6.4 oz (86.8 kg) Height: 5' 4\" (1.626 m) PainSc:   5 PainLoc: Back General appearance - alert, well appearing, and in no distress Mental status - alert, oriented to person, place, and time HEENT: 
Ears - bilateral TM's and external ear canals clear Eyes - pupillary responses were normal.  Extraocular muscle function intact. Lids and conjunctiva not injected. Fundoscopic exam revealed sharp disc margins. eye movements intact Pharynx- clear with teeth in good repair. No masses were noted Neck - supple with pain on range of motion without thyromegaly or burit. No JVD noted Lungs - clear to auscultation and percussion Cardiac- normal rate, regular rhythm without murmurs. PMI not displaced. No gallop, rub or click Abdomen - flat, soft, non-tender without palpable organomegaly or mass. No pulsatile mass was felt, and not bruit was heard. Bowel sounds were active Extremities -  no clubbing cyanosis or edema Lymphatics - no palpable lymphadenopathy, no hepatosplenomegaly Peripheral vascular - Dorsalis pedis and posterior tibial pulses felt without difficulty Skin - no rash or unusual mole change noted Neurological - Cranial nerves II-XII grossly intact. Motor strength 5/5. DTR's 2+ and symmetric. Station and gait normal 
Back exam - full range of motion, no tenderness, palpable spasm or pain on motion Musculoskeletal - no joint tenderness, deformity or swelling Hematologic: no purpura, petechiae or bruising Assessment/Plan: 1. Pre-operative cardiovascular examination 2. Spinal stenosis, cervical region 3. Hypertension with renal disease 4. Controlled type 2 diabetes mellitus with stage 2 chronic kidney disease, with long-term current use of insulin ([address] 5. Mixed hyperlipidemia 6. Fever blister Impression 1. Preoperative cardiovascular examination completed today. Labs to be done at the hospital as well as EKG and chest x-ray and I will check those once they are done. 2.  Cervical spinal stenosis for discectomy with possible effusion is noted 3. Hypertension that is controlled 4. Diabetes her A1c from January was 7.4 which time we did recommend more strict diet for improvement 5. Hyperlipidemia good on last check 6. Fever blister we will treat this with Valtrex As for the pleuritic chest pain previous evaluation with chest x-rays have been negative and she will be having a chest x-ray preop so I did not repeat that today. Provided lab, x-ray and EKG are all okay then she is medically stable for surgery. Copy to Dr. Amanda Kumar Orders Placed This Encounter  valACYclovir (VALTREX) 1 gram tablet Sig: Take 1 Tab by mouth two (2) times a day. Dispense:  14 Tab Refill:  0 No results found for any visits on 03/05/19. I have reviewed the patient's medical history in detail and updated the computerized patient record. We had a prolonged discussion about these complex clinical issues and went over the various important aspects to consider. All questions were answered. Advised her to call back or return to office if symptoms do not improve, change in nature, or persist. 
 
She was given an after visit summary or informed of [facility] which includes patient instructions, diagnoses, current medications, & vitals. She expressed understanding with the diagnosis and plan.   
  
Elliot Lemus MD

## 2019-03-05 ENCOUNTER — OFFICE VISIT (OUTPATIENT)
Dept: INTERNAL MEDICINE CLINIC | Age: 74
End: 2019-03-05

## 2019-03-05 VITALS
DIASTOLIC BLOOD PRESSURE: 88 MMHG | HEART RATE: 90 BPM | HEIGHT: 64 IN | TEMPERATURE: 99 F | BODY MASS INDEX: 32.68 KG/M2 | OXYGEN SATURATION: 96 % | RESPIRATION RATE: 18 BRPM | WEIGHT: 191.4 LBS | SYSTOLIC BLOOD PRESSURE: 136 MMHG

## 2019-03-05 DIAGNOSIS — E78.2 MIXED HYPERLIPIDEMIA: ICD-10-CM

## 2019-03-05 DIAGNOSIS — I12.9 HYPERTENSION WITH RENAL DISEASE: ICD-10-CM

## 2019-03-05 DIAGNOSIS — B00.1 FEVER BLISTER: ICD-10-CM

## 2019-03-05 DIAGNOSIS — N18.2 CONTROLLED TYPE 2 DIABETES MELLITUS WITH STAGE 2 CHRONIC KIDNEY DISEASE, WITH LONG-TERM CURRENT USE OF INSULIN (HCC): ICD-10-CM

## 2019-03-05 DIAGNOSIS — M48.02 SPINAL STENOSIS, CERVICAL REGION: ICD-10-CM

## 2019-03-05 DIAGNOSIS — E11.22 CONTROLLED TYPE 2 DIABETES MELLITUS WITH STAGE 2 CHRONIC KIDNEY DISEASE, WITH LONG-TERM CURRENT USE OF INSULIN (HCC): ICD-10-CM

## 2019-03-05 DIAGNOSIS — Z79.4 CONTROLLED TYPE 2 DIABETES MELLITUS WITH STAGE 2 CHRONIC KIDNEY DISEASE, WITH LONG-TERM CURRENT USE OF INSULIN (HCC): ICD-10-CM

## 2019-03-05 DIAGNOSIS — Z01.810 PRE-OPERATIVE CARDIOVASCULAR EXAMINATION: Primary | ICD-10-CM

## 2019-03-05 RX ORDER — VALACYCLOVIR HYDROCHLORIDE 1 G/1
1000 TABLET, FILM COATED ORAL 2 TIMES DAILY
Qty: 14 TAB | Refills: 0 | Status: SHIPPED | OUTPATIENT
Start: 2019-03-05 | End: 2019-03-20

## 2019-03-05 NOTE — PROGRESS NOTES
Identified pt with two pt identifiers(name and ). Reviewed record in preparation for visit and have obtained necessary documentation. Chief Complaint   Patient presents with    Pre-op Exam     neck surgery 3-27-19 with Dr. Per Barbosa Maintenance Due   Topic    DTaP/Tdap/Td series (1 - Tdap)    Shingrix Vaccine Age 50> (1 of 2)       Coordination of Care Questionnaire:  :   1) Have you been to an emergency room, urgent care, or hospitalized since your last visit? If yes, where when, and reason for visit? no       2. Have seen or consulted any other health care provider since your last visit? If yes, where when, and reason for visit? YES, Dr. Olinda Schaeffer 19. 3) Do you have an Advanced Directive/ Living Will in place? YES  If yes, do we have a copy on file YES  If no, would you like information NO    Patient is accompanied by self I have received verbal consent from Emelina Lopez to discuss any/all medical information while they are present in the room.

## 2019-03-05 NOTE — PATIENT INSTRUCTIONS
Arthritis: Care Instructions  Your Care Instructions  Arthritis, also called osteoarthritis, is a breakdown of the cartilage that cushions your joints. When the cartilage wears down, your bones rub against each other. This causes pain and stiffness. Many people have some arthritis as they age. Arthritis most often affects the joints of the spine, hands, hips, knees, or feet. You can take simple measures to protect your joints, ease your pain, and help you stay active. Follow-up care is a key part of your treatment and safety. Be sure to make and go to all appointments, and call your doctor if you are having problems. It's also a good idea to know your test results and keep a list of the medicines you take. How can you care for yourself at home? · Stay at a healthy weight. Being overweight puts extra strain on your joints. · Talk to your doctor or physical therapist about exercises that will help ease joint pain. ? Stretch. You may enjoy gentle forms of yoga to help keep your joints and muscles flexible. ? Walk instead of jog. Other types of exercise that are less stressful on the joints include riding a bicycle, swimming, bebo chi, or water exercise. ? Lift weights. Strong muscles help reduce stress on your joints. Stronger thigh muscles, for example, take some of the stress off of the knees and hips. Learn the right way to lift weights so you do not make joint pain worse. · Take your medicines exactly as prescribed. Call your doctor if you think you are having a problem with your medicine. · Take pain medicines exactly as directed. ? If the doctor gave you a prescription medicine for pain, take it as prescribed. ? If you are not taking a prescription pain medicine, ask your doctor if you can take an over-the-counter medicine. · Use a cane, crutch, walker, or another device if you need help to get around. These can help rest your joints.  You also can use other things to make life easier, such as a higher toilet seat and padded handles on kitchen utensils. · Do not sit in low chairs, which can make it hard to get up. · Put heat or cold on your sore joints as needed. Use whichever helps you most. You also can take turns with hot and cold packs. ? Apply heat 2 or 3 times a day for 20 to 30 minutesusing a heating pad, hot shower, or hot packto relieve pain and stiffness. ? Put ice or a cold pack on your sore joint for 10 to 20 minutes at a time. Put a thin cloth between the ice and your skin. When should you call for help? Call your doctor now or seek immediate medical care if:    · You have sudden swelling, warmth, or pain in any joint.     · You have joint pain and a fever or rash.     · You have such bad pain that you cannot use a joint.    Watch closely for changes in your health, and be sure to contact your doctor if:    · You have mild joint symptoms that continue even with more than 6 weeks of care at home.     · You have stomach pain or other problems with your medicine. Where can you learn more? Go to http://malia-lincoln.info/. Enter V348 in the search box to learn more about \"Arthritis: Care Instructions. \"  Current as of: Etelvina 10, 2018  Content Version: 11.9  © 8970-8589 [employer]wise[employer]. Care instructions adapted under license by [employer] (which disclaims liability or warranty for this information). If you have questions about a medical condition or this instruction, always ask your healthcare professional. [place] [address] any warranty or liability for your use of this information.

## 2019-03-20 ENCOUNTER — HOSPITAL ENCOUNTER (OUTPATIENT)
Dept: PREADMISSION TESTING | Age: 74
Discharge: HOME | End: 2019-03-20
Payer: COMMERCIAL

## 2019-03-20 VITALS
HEART RATE: 88 BPM | RESPIRATION RATE: 14 BRPM | OXYGEN SATURATION: 97 % | BODY MASS INDEX: 32.44 KG/M2 | SYSTOLIC BLOOD PRESSURE: 138 MMHG | TEMPERATURE: 98.1 F | DIASTOLIC BLOOD PRESSURE: 82 MMHG | HEIGHT: 64 IN | WEIGHT: 190 LBS

## 2019-03-20 LAB
ABO + RH BLD: NORMAL
ANION GAP SERPL CALC-SCNC: 7 MMOL/L (ref 5–15)
APPEARANCE UR: CLEAR
BACTERIA URNS QL MICRO: NEGATIVE /HPF
BILIRUB UR QL: NEGATIVE
BLOOD GROUP ANTIBODIES SERPL: NORMAL
BUN SERPL-MCNC: 21 MG/DL (ref 6–20)
BUN/CREAT SERPL: 24 (ref 12–20)
CALCIUM SERPL-MCNC: 9.5 MG/DL (ref 8.5–10.1)
CHLORIDE SERPL-SCNC: 104 MMOL/L (ref 97–108)
CO2 SERPL-SCNC: 30 MMOL/L (ref 21–32)
COLOR UR: ABNORMAL
CREAT SERPL-MCNC: 0.88 MG/DL (ref 0.55–1.02)
EPITH CASTS URNS QL MICRO: ABNORMAL /LPF
EST. AVERAGE GLUCOSE BLD GHB EST-MCNC: 163 MG/DL
GLUCOSE SERPL-MCNC: 228 MG/DL (ref 65–100)
GLUCOSE UR STRIP.AUTO-MCNC: >1000 MG/DL
HBA1C MFR BLD: 7.3 % (ref 4.2–6.3)
HGB UR QL STRIP: NEGATIVE
INR PPP: 1.1 (ref 0.9–1.1)
KETONES UR QL STRIP.AUTO: NEGATIVE MG/DL
LEUKOCYTE ESTERASE UR QL STRIP.AUTO: NEGATIVE
NITRITE UR QL STRIP.AUTO: NEGATIVE
PH UR STRIP: 6 [PH] (ref 5–8)
POTASSIUM SERPL-SCNC: 4.1 MMOL/L (ref 3.5–5.1)
PROT UR STRIP-MCNC: NEGATIVE MG/DL
PROTHROMBIN TIME: 10.8 SEC (ref 9–11.1)
RBC #/AREA URNS HPF: ABNORMAL /HPF (ref 0–5)
SODIUM SERPL-SCNC: 141 MMOL/L (ref 136–145)
SP GR UR REFRACTOMETRY: 1.03 (ref 1–1.03)
SPECIMEN EXP DATE BLD: NORMAL
UA: UC IF INDICATED,UAUC: ABNORMAL
UROBILINOGEN UR QL STRIP.AUTO: 0.2 EU/DL (ref 0.2–1)
WBC URNS QL MICRO: ABNORMAL /HPF (ref 0–4)

## 2019-03-20 PROCEDURE — 36415 COLL VENOUS BLD VENIPUNCTURE: CPT

## 2019-03-20 PROCEDURE — 80048 BASIC METABOLIC PNL TOTAL CA: CPT

## 2019-03-20 PROCEDURE — 87086 URINE CULTURE/COLONY COUNT: CPT

## 2019-03-20 PROCEDURE — 85610 PROTHROMBIN TIME: CPT

## 2019-03-20 PROCEDURE — 81001 URINALYSIS AUTO W/SCOPE: CPT

## 2019-03-20 PROCEDURE — 83036 HEMOGLOBIN GLYCOSYLATED A1C: CPT

## 2019-03-20 PROCEDURE — 86900 BLOOD TYPING SEROLOGIC ABO: CPT

## 2019-03-20 RX ORDER — TRAMADOL HYDROCHLORIDE 50 MG/1
50 TABLET ORAL
COMMUNITY
End: 2019-04-02

## 2019-03-20 RX ORDER — CYCLOBENZAPRINE HCL 10 MG
10 TABLET ORAL
COMMUNITY
End: 2019-03-20

## 2019-03-20 RX ORDER — IBUPROFEN 200 MG
400 TABLET ORAL
COMMUNITY
End: 2019-04-02

## 2019-03-21 LAB
BACTERIA SPEC CULT: NORMAL
CC UR VC: NORMAL
SERVICE CMNT-IMP: NORMAL
SERVICE CMNT-IMP: NORMAL

## 2019-03-21 NOTE — PERIOP NOTES
DR. Gina Watkins OFFICE CALLED  AND VOICE MESSAGE WAS LEFT WITH ASHUTOSH RUBALCAVA REGARDING THE FOLLOWING ABNORMAL LAB VALUES:  WSRQ7R-8.3 (H), GLUCOSE-228 (H), BUN/CREATININE RATIO-24 (H), URINE CULTURE HAS BEEN ORDERED. ALL LABS AND EKG HAVE BEEN FAXED TO DR. NAGY'S OFFICE.

## 2019-03-21 NOTE — H&P
Pauline Meigs  Location: - [facility]  Patient #: [id number]  : 1945   / Language: English / Race: White  Female      History of Present Illness   The patient is a 69year old female who presents for a Recheck of Chronic lumbar back pain. This condition occurred following a specific injury (Patient states she was doing laundry, she was very close to the floor and lost her balance falling.). Symptoms include pain. Symptoms are located in the low back (her pain is located in the center of her upper Lumbar spine). There is no radiation. The patient describes the pain as aching. The patient describes symptoms as moderate in severity. Current treatment includes physical therapy. Recently the disease has been worsening. The patient has a surgical history of back surgery (S/P 7 months from a T10 to lumbar fusion) and hip surgery (bilateral hip replacements). Past evaluation has included x-ray of the lumbar spine and MRI of the lumbar spine. Past treatment has included physical therapy and back surgery.       Problem List/Past Medical  Essential Hypertension    Osteoarthritis of one hip, right (715.95  M16.11)    Status post hip replacement (V43.64  Z96.649)    Primary osteoarthritis of both hips (715.15  M16.0)    SCIATICA (724.3  M54.30)    Rotator cuff disorder, right (726.10  M67.911)    REVIEW OF SYSTEMS: Systems were reviewed by the provider.    DIETARY COUNSELING (V65.3)    SCOLIOSIS, IDIOPATHIC (737.30)    Unsteady gait (781.2  R26.81)    DDD (722.52) (722.52  M51.36)    Weight above 97th percentile (V49.89  Z78.9)    S/P lumbar fusion (V45.4  Z98.1)    LOW BACK PAIN (724.2) (724.2  M54.5)    Lumbar stenosis with neurogenic claudication (724.03  M48.062)    Glenohumeral arthritis, right (715.91  M19.011)      Allergies   Sulfa Drugs    Penicillins    Darvocet-N 100 *ANALGESICS - OPIOID*      Family History   Kidney disease    Hypercholesterolemia    Hypertension      Social History  Alcohol use   : Drinks 3 times per year. Tobacco / smoke exposure   : No  Caffeine use : Drinks tea 5-6 times a day. Illicit drug use   : none  Sun Exposure   : frequently  Exercise   : Walks occasionally. Seat Belt Use : almost always  Tobacco use   Never smoker. HIV risk factors : no    Medication History   Medications Reconciled     Past Surgical History  Mastectomy   bilateral  Spinal Decompression   lower back  Appendectomy    Cataract Surgery   bilateral  Breast Reconstruction   bilateral  Hysterectomy   non-cancerous: complete  Spinal Fusion   lower back  Spinal Surgery    Tonsillectomy    Tubal Ligation    LEFT TOTAL HIP ARTHROPLASTY (61096)  [11/16/2016]:    Diagnostic Studies History   Cervical Spine X-ray   Date: 2/28/2019, Results: MRI of the cervical spine demonstrates severe spondylosis at C5-6 and C6-7; mild spondylosis at C4-5. Severe stenosis C5-6 and C6-7 with foraminal stenosis and cord compression. Other Problems   Breast Cancer    Unspecified Diagnosis    Arthritis    Depression    Hypercholesterolemia    Treatment options were discussed with the patient in full.      Review of Systems   General Not Present- Chills and Fatigue. Skin Not Present- Bruising, Pallor and Skin Color Changes. Respiratory Not Present- Cough and Difficulty Breathing. Cardiovascular Not Present- Chest Pain, Fainting / Blacking Out and Rapid Heart Rate. Musculoskeletal Not Present- Decreased Range of Motion, Joint Pain and Joint Swelling. Neurological Not Present- Dysesthesia, Paresthesias and Weakness In Extremities. Hematology Not Present- Abnormal Bleeding, Blood Clots and Petechiae. Physical Exam  Neurologic  Sensory  Light Touch - Intact - Globally. Overall Assessment of Muscle Strength and Tone reveals  Upper Extremities - Right Deltoid - 3+/5. Left Deltoid - 5/5. Right Bicep - 3+/5. Left Bicep - 5/5. Right Tricep - 5/5. Left Tricep - 5/5. Right Wrist Extensors - 5/5.  Left Wrist Extensors - 4-/5. Right Wrist Flexors - 5/5. Left Wrist Flexors - 5/5. Right Intrinsics - 5/5. Left Intrinsics - 5/5. Lower Extremities - Right Iliopsoas - 5/5. Left Iliopsoas - 5/5. Right Tibialis Anterior - 5/5. Left Tibialis Anterior - 5/5. Right Gastroc-Soleus - 5/5. Left Gastroc-Soleus - 5/5. Right EHL - 5/5. Left EHL - 5/5. General Assessment of Reflexes  Right Hand - Chirinos's sign is positive in the right hand. Left Hand - Chirinos's sign is positive in the left hand. Right Ankle - Clonus is not present. Left Ankle - Clonus is not present. Reflexes (Dermatomes)  2/2 Normal - Left Achilles (L5-S2), Left Bicep (C5-6), Left Knee (L2-4), Left Tricep (C7-8), Left Brachioradialis (C5-6), Right Achilles (L5-S2), Right Bicep (C5-6), Right Knee (L2-4), Right Tricep (C7-8) and Right Brachioradialis (C5-6). Musculoskeletal  Global Assessment  Examination of related systems reveals - well-developed, well-nourished, in no acute distress, alert and oriented x 3 and normal coordination. Gait and Station - normal gait and station and normal posture. Note: The patient ambulates with a forward weight bearing line. Right Lower Extremity - normal range of motion without pain and no instability, subluxation or laxity. Left Lower Extremity - normal range of motion without pain and no instability, subluxation or laxity. Spine/Ribs/Pelvis  Cervical Spine - Evaluation of related systems reveals - no lymphadenopathy and neurovascularly intact bilaterally. Examination of the cervical spine reveals - no swelling, edema or erythema and normal sensation. Inspection and Palpation - Tenderness - moderate and localized. Assessment of pain reveals the following findings: - Location - cervical area. ROJM - Flexion - 85 °. Right Lateral Flexion - 35 °. Left Lateral Flexion - 35 °. Extension - 70 °. Right Rotation - 80 °.  Left Rotation - . Cervical Spine - Functional Testing - Foraminal Compression/Spurling's Test negative, Shoulder Depression Test negative, Upper Limb Tension Test negative. Thoracic (Dorsal) Spine - Examination of the thoracic spine reveals - no tenderness over thoracic vertebrae, no pain, normal sensation and normal thoracic spine movements. Lumbosacral Spine - Examination of the lumbosacral spine reveals - no tenderness to palpation, no pain, normal strength and tone, no laxity or crepitus, normal lumbosacral spine movements and no known fractures or deformities. Functional Testing - Babinski Test negative, Prone Knee Bending Test negative, Slump Test negative, Straight Leg Raising Test negative. Assessment & Plan   Cervical spinal stenosis (723.0  M48.02)  Impression: Unfortunately, Ms. Ambreen Nunn is now exhibiting worsening signs of myelopathy. She is having falls and is losing manual dexerity. She has severe spondylosis at C5-6 and C6-7 with severe stenosis. There is cord compression. She is a candidate for an ACDF at C5-6 and C6-7 for decompression and stabilization. The risks and benefits were discussed at length with the patient and the patient has elected to proceed. Indications for surgery include failed conservative treatment. Alternative treatments, risks and the perioperative course were discussed with the patient. All questions were answered. The risks and benefits of the procedure were explained. Benefits include definitive diagnosis, relief of pain, elimination of deformity and improved function. Risks of surgery including bleeding, infection, weakness, numbness, CSF leak, failure to improve symptoms, exacerbation of medical co-morbidities and even death were discussed with the patient.   Current Plans  Presurgical planning was preformed with the patient today  Surgery to be scheduled  Procedure: C5-6 and C6-7 ACDF    Cervicalgia (723.1  M54.2)  Cervical radiculitis (723.4  M54.12)  Treatment options were discussed with the patient in full.(V65.49)  Current Plans  Pt Education - How to access health information online: discussed with patient and provided information. Pt Education - Educational materials were provided.: discussed with patient and provided information.       Signed by Jen Rascon MD

## 2019-03-25 ENCOUNTER — ANESTHESIA EVENT (OUTPATIENT)
Dept: SURGERY | Age: 74
DRG: 472 | End: 2019-03-25
Payer: COMMERCIAL

## 2019-03-26 ENCOUNTER — ANESTHESIA (OUTPATIENT)
Dept: SURGERY | Age: 74
DRG: 472 | End: 2019-03-26
Payer: COMMERCIAL

## 2019-03-26 ENCOUNTER — APPOINTMENT (OUTPATIENT)
Dept: GENERAL RADIOLOGY | Age: 74
DRG: 472 | End: 2019-03-26
Attending: ORTHOPAEDIC SURGERY
Payer: COMMERCIAL

## 2019-03-26 ENCOUNTER — HOSPITAL ENCOUNTER (INPATIENT)
Age: 74
LOS: 7 days | Discharge: HOME HEALTH | DRG: 472 | End: 2019-04-02
Attending: ORTHOPAEDIC SURGERY | Admitting: ORTHOPAEDIC SURGERY
Payer: COMMERCIAL

## 2019-03-26 DIAGNOSIS — M51.9 LUMBAR DISC DISEASE: ICD-10-CM

## 2019-03-26 DIAGNOSIS — M48.062 SPINAL STENOSIS OF LUMBAR REGION WITH NEUROGENIC CLAUDICATION: ICD-10-CM

## 2019-03-26 DIAGNOSIS — M48.02 CERVICAL STENOSIS OF SPINE: Primary | ICD-10-CM

## 2019-03-26 LAB
GLUCOSE BLD STRIP.AUTO-MCNC: 148 MG/DL (ref 65–100)
GLUCOSE BLD STRIP.AUTO-MCNC: 151 MG/DL (ref 65–100)
GLUCOSE BLD STRIP.AUTO-MCNC: 170 MG/DL (ref 65–100)
SERVICE CMNT-IMP: ABNORMAL

## 2019-03-26 PROCEDURE — C1713 ANCHOR/SCREW BN/BN,TIS/BN: HCPCS | Performed by: ORTHOPAEDIC SURGERY

## 2019-03-26 PROCEDURE — 0RT30ZZ RESECTION OF CERVICAL VERTEBRAL DISC, OPEN APPROACH: ICD-10-PCS | Performed by: ORTHOPAEDIC SURGERY

## 2019-03-26 PROCEDURE — 77030029099 HC BN WAX SSPC -A: Performed by: ORTHOPAEDIC SURGERY

## 2019-03-26 PROCEDURE — 77030003832 HC BIT DRL ATLNTS MEDT -B: Performed by: ORTHOPAEDIC SURGERY

## 2019-03-26 PROCEDURE — 76010000171 HC OR TIME 2 TO 2.5 HR INTENSV-TIER 1: Performed by: ORTHOPAEDIC SURGERY

## 2019-03-26 PROCEDURE — 74011000250 HC RX REV CODE- 250: Performed by: ORTHOPAEDIC SURGERY

## 2019-03-26 PROCEDURE — 77030020782 HC GWN BAIR PAWS FLX 3M -B

## 2019-03-26 PROCEDURE — 74011250636 HC RX REV CODE- 250/636

## 2019-03-26 PROCEDURE — 74011250637 HC RX REV CODE- 250/637: Performed by: PHYSICIAN ASSISTANT

## 2019-03-26 PROCEDURE — 82962 GLUCOSE BLOOD TEST: CPT

## 2019-03-26 PROCEDURE — 77030012893

## 2019-03-26 PROCEDURE — 74011250636 HC RX REV CODE- 250/636: Performed by: PHYSICIAN ASSISTANT

## 2019-03-26 PROCEDURE — 76060000035 HC ANESTHESIA 2 TO 2.5 HR: Performed by: ORTHOPAEDIC SURGERY

## 2019-03-26 PROCEDURE — 77030026438 HC STYL ET INTUB CARD -A: Performed by: ANESTHESIOLOGY

## 2019-03-26 PROCEDURE — 77030038600 HC TU BPLR IRR DISP STRY -B: Performed by: ORTHOPAEDIC SURGERY

## 2019-03-26 PROCEDURE — 4A11X4G MONITORING OF PERIPHERAL NERVOUS ELECTRICAL ACTIVITY, INTRAOPERATIVE, EXTERNAL APPROACH: ICD-10-PCS | Performed by: ORTHOPAEDIC SURGERY

## 2019-03-26 PROCEDURE — 77030004391 HC BUR FLUT MEDT -C: Performed by: ORTHOPAEDIC SURGERY

## 2019-03-26 PROCEDURE — 77030011267 HC ELECTRD BLD COVD -A: Performed by: ORTHOPAEDIC SURGERY

## 2019-03-26 PROCEDURE — 77030032490 HC SLV COMPR SCD KNE COVD -B: Performed by: ORTHOPAEDIC SURGERY

## 2019-03-26 PROCEDURE — 77030034475 HC MISC IMPL SPN: Performed by: ORTHOPAEDIC SURGERY

## 2019-03-26 PROCEDURE — 51798 US URINE CAPACITY MEASURE: CPT

## 2019-03-26 PROCEDURE — 76210000000 HC OR PH I REC 2 TO 2.5 HR: Performed by: ORTHOPAEDIC SURGERY

## 2019-03-26 PROCEDURE — 74011000272 HC RX REV CODE- 272: Performed by: ORTHOPAEDIC SURGERY

## 2019-03-26 PROCEDURE — 77030003666 HC NDL SPINAL BD -A: Performed by: ORTHOPAEDIC SURGERY

## 2019-03-26 PROCEDURE — 77030002933 HC SUT MCRYL J&J -A: Performed by: ORTHOPAEDIC SURGERY

## 2019-03-26 PROCEDURE — 65270000029 HC RM PRIVATE

## 2019-03-26 PROCEDURE — 74011250636 HC RX REV CODE- 250/636: Performed by: ANESTHESIOLOGY

## 2019-03-26 PROCEDURE — V2790 AMNIOTIC MEMBRANE: HCPCS | Performed by: ORTHOPAEDIC SURGERY

## 2019-03-26 PROCEDURE — 77030008684 HC TU ET CUF COVD -B: Performed by: ANESTHESIOLOGY

## 2019-03-26 PROCEDURE — 77030018836 HC SOL IRR NACL ICUM -A: Performed by: ORTHOPAEDIC SURGERY

## 2019-03-26 PROCEDURE — 74011000250 HC RX REV CODE- 250

## 2019-03-26 PROCEDURE — 74011636637 HC RX REV CODE- 636/637: Performed by: PHYSICIAN ASSISTANT

## 2019-03-26 PROCEDURE — 0RG20A0 FUSION OF 2 OR MORE CERVICAL VERTEBRAL JOINTS WITH INTERBODY FUSION DEVICE, ANTERIOR APPROACH, ANTERIOR COLUMN, OPEN APPROACH: ICD-10-PCS | Performed by: ORTHOPAEDIC SURGERY

## 2019-03-26 DEVICE — Z DUP USE 2602123 GRAFT HUM TISS 3CMX 4CM AMNIO MEM VERSASHIELD: Type: IMPLANTABLE DEVICE | Site: SPINE CERVICAL | Status: FUNCTIONAL

## 2019-03-26 DEVICE — CAGE 5030764 ANATOMIC PTC 16X14X7MM
Type: IMPLANTABLE DEVICE | Site: SPINE CERVICAL | Status: FUNCTIONAL
Brand: ANATOMIC PEEK PTC CERVICAL FUSION SYSTEM

## 2019-03-26 DEVICE — SCREW 3120514 4.0 X 14 SELF DRILL VAR
Type: IMPLANTABLE DEVICE | Site: SPINE CERVICAL | Status: FUNCTIONAL
Brand: ATLANTIS® ANTERIOR CERVICAL PLATE SYSTEM

## 2019-03-26 DEVICE — GRAFT BNE SUB SM CANC FRZN MORSELIZED W/ VIABLE CELL: Type: IMPLANTABLE DEVICE | Site: SPINE CERVICAL | Status: FUNCTIONAL

## 2019-03-26 RX ORDER — SODIUM CHLORIDE 9 MG/ML
50 INJECTION, SOLUTION INTRAVENOUS CONTINUOUS
Status: DISCONTINUED | OUTPATIENT
Start: 2019-03-26 | End: 2019-03-26 | Stop reason: HOSPADM

## 2019-03-26 RX ORDER — SODIUM CHLORIDE 0.9 % (FLUSH) 0.9 %
5-40 SYRINGE (ML) INJECTION EVERY 8 HOURS
Status: DISCONTINUED | OUTPATIENT
Start: 2019-03-26 | End: 2019-04-02 | Stop reason: HOSPADM

## 2019-03-26 RX ORDER — OXYCODONE HYDROCHLORIDE 5 MG/1
10 TABLET ORAL
Status: DISCONTINUED | OUTPATIENT
Start: 2019-03-26 | End: 2019-04-02 | Stop reason: HOSPADM

## 2019-03-26 RX ORDER — FENTANYL CITRATE 50 UG/ML
INJECTION, SOLUTION INTRAMUSCULAR; INTRAVENOUS AS NEEDED
Status: DISCONTINUED | OUTPATIENT
Start: 2019-03-26 | End: 2019-03-26 | Stop reason: HOSPADM

## 2019-03-26 RX ORDER — FENTANYL CITRATE 50 UG/ML
50 INJECTION, SOLUTION INTRAMUSCULAR; INTRAVENOUS AS NEEDED
Status: DISCONTINUED | OUTPATIENT
Start: 2019-03-26 | End: 2019-03-26 | Stop reason: HOSPADM

## 2019-03-26 RX ORDER — SODIUM CHLORIDE 0.9 % (FLUSH) 0.9 %
5-40 SYRINGE (ML) INJECTION AS NEEDED
Status: DISCONTINUED | OUTPATIENT
Start: 2019-03-26 | End: 2019-04-02 | Stop reason: HOSPADM

## 2019-03-26 RX ORDER — ONDANSETRON 2 MG/ML
INJECTION INTRAMUSCULAR; INTRAVENOUS AS NEEDED
Status: DISCONTINUED | OUTPATIENT
Start: 2019-03-26 | End: 2019-03-26 | Stop reason: HOSPADM

## 2019-03-26 RX ORDER — DIPHENHYDRAMINE HYDROCHLORIDE 50 MG/ML
12.5 INJECTION, SOLUTION INTRAMUSCULAR; INTRAVENOUS
Status: ACTIVE | OUTPATIENT
Start: 2019-03-26 | End: 2019-03-27

## 2019-03-26 RX ORDER — FENTANYL CITRATE 50 UG/ML
25 INJECTION, SOLUTION INTRAMUSCULAR; INTRAVENOUS
Status: COMPLETED | OUTPATIENT
Start: 2019-03-26 | End: 2019-03-26

## 2019-03-26 RX ORDER — MORPHINE SULFATE 10 MG/ML
2 INJECTION, SOLUTION INTRAMUSCULAR; INTRAVENOUS
Status: DISCONTINUED | OUTPATIENT
Start: 2019-03-26 | End: 2019-03-26 | Stop reason: HOSPADM

## 2019-03-26 RX ORDER — CARVEDILOL 3.12 MG/1
3.12 TABLET ORAL DAILY
Status: DISCONTINUED | OUTPATIENT
Start: 2019-03-27 | End: 2019-04-02 | Stop reason: HOSPADM

## 2019-03-26 RX ORDER — AZELAIC ACID 0.15 G/G
GEL TOPICAL 2 TIMES DAILY
Status: DISCONTINUED | OUTPATIENT
Start: 2019-03-26 | End: 2019-03-27

## 2019-03-26 RX ORDER — MIDAZOLAM HYDROCHLORIDE 1 MG/ML
INJECTION, SOLUTION INTRAMUSCULAR; INTRAVENOUS AS NEEDED
Status: DISCONTINUED | OUTPATIENT
Start: 2019-03-26 | End: 2019-03-26 | Stop reason: HOSPADM

## 2019-03-26 RX ORDER — DEXMEDETOMIDINE HYDROCHLORIDE 4 UG/ML
INJECTION, SOLUTION INTRAVENOUS AS NEEDED
Status: DISCONTINUED | OUTPATIENT
Start: 2019-03-26 | End: 2019-03-26 | Stop reason: HOSPADM

## 2019-03-26 RX ORDER — MIDAZOLAM HYDROCHLORIDE 1 MG/ML
0.5 INJECTION, SOLUTION INTRAMUSCULAR; INTRAVENOUS
Status: COMPLETED | OUTPATIENT
Start: 2019-03-26 | End: 2019-03-26

## 2019-03-26 RX ORDER — OXYCODONE AND ACETAMINOPHEN 5; 325 MG/1; MG/1
1 TABLET ORAL AS NEEDED
Status: DISCONTINUED | OUTPATIENT
Start: 2019-03-26 | End: 2019-03-26 | Stop reason: HOSPADM

## 2019-03-26 RX ORDER — MORPHINE SULFATE 2 MG/ML
2 INJECTION, SOLUTION INTRAMUSCULAR; INTRAVENOUS
Status: ACTIVE | OUTPATIENT
Start: 2019-03-26 | End: 2019-03-27

## 2019-03-26 RX ORDER — MAGNESIUM SULFATE 100 %
4 CRYSTALS MISCELLANEOUS AS NEEDED
Status: DISCONTINUED | OUTPATIENT
Start: 2019-03-26 | End: 2019-04-02 | Stop reason: HOSPADM

## 2019-03-26 RX ORDER — ACETAMINOPHEN 10 MG/ML
INJECTION, SOLUTION INTRAVENOUS AS NEEDED
Status: DISCONTINUED | OUTPATIENT
Start: 2019-03-26 | End: 2019-03-26 | Stop reason: HOSPADM

## 2019-03-26 RX ORDER — METFORMIN HYDROCHLORIDE 500 MG/1
500 TABLET, EXTENDED RELEASE ORAL
Status: DISCONTINUED | OUTPATIENT
Start: 2019-03-26 | End: 2019-04-02 | Stop reason: HOSPADM

## 2019-03-26 RX ORDER — BENZONATATE 100 MG/1
200 CAPSULE ORAL
Status: DISCONTINUED | OUTPATIENT
Start: 2019-03-26 | End: 2019-04-02 | Stop reason: HOSPADM

## 2019-03-26 RX ORDER — SODIUM CHLORIDE 9 MG/ML
125 INJECTION, SOLUTION INTRAVENOUS CONTINUOUS
Status: DISPENSED | OUTPATIENT
Start: 2019-03-26 | End: 2019-03-27

## 2019-03-26 RX ORDER — POLYETHYLENE GLYCOL 3350 17 G/17G
17 POWDER, FOR SOLUTION ORAL DAILY
Status: DISCONTINUED | OUTPATIENT
Start: 2019-03-27 | End: 2019-04-02 | Stop reason: HOSPADM

## 2019-03-26 RX ORDER — SODIUM CHLORIDE 0.9 % (FLUSH) 0.9 %
5-40 SYRINGE (ML) INJECTION EVERY 8 HOURS
Status: DISCONTINUED | OUTPATIENT
Start: 2019-03-26 | End: 2019-03-26 | Stop reason: HOSPADM

## 2019-03-26 RX ORDER — FACIAL-BODY WIPES
10 EACH TOPICAL DAILY PRN
Status: DISCONTINUED | OUTPATIENT
Start: 2019-03-28 | End: 2019-04-02 | Stop reason: HOSPADM

## 2019-03-26 RX ORDER — OXAZEPAM 15 MG/1
30 CAPSULE ORAL
Status: DISCONTINUED | OUTPATIENT
Start: 2019-03-26 | End: 2019-04-02 | Stop reason: HOSPADM

## 2019-03-26 RX ORDER — SODIUM CHLORIDE, SODIUM LACTATE, POTASSIUM CHLORIDE, CALCIUM CHLORIDE 600; 310; 30; 20 MG/100ML; MG/100ML; MG/100ML; MG/100ML
INJECTION, SOLUTION INTRAVENOUS
Status: DISCONTINUED | OUTPATIENT
Start: 2019-03-26 | End: 2019-03-26 | Stop reason: HOSPADM

## 2019-03-26 RX ORDER — KETAMINE HYDROCHLORIDE 10 MG/ML
INJECTION, SOLUTION INTRAMUSCULAR; INTRAVENOUS AS NEEDED
Status: DISCONTINUED | OUTPATIENT
Start: 2019-03-26 | End: 2019-03-26 | Stop reason: HOSPADM

## 2019-03-26 RX ORDER — MIDAZOLAM HYDROCHLORIDE 1 MG/ML
1 INJECTION, SOLUTION INTRAMUSCULAR; INTRAVENOUS AS NEEDED
Status: DISCONTINUED | OUTPATIENT
Start: 2019-03-26 | End: 2019-03-26 | Stop reason: HOSPADM

## 2019-03-26 RX ORDER — BUPROPION HYDROCHLORIDE 150 MG/1
150 TABLET, EXTENDED RELEASE ORAL DAILY
Status: DISCONTINUED | OUTPATIENT
Start: 2019-03-27 | End: 2019-04-02 | Stop reason: HOSPADM

## 2019-03-26 RX ORDER — SERTRALINE HYDROCHLORIDE 50 MG/1
100 TABLET, FILM COATED ORAL DAILY
Status: DISCONTINUED | OUTPATIENT
Start: 2019-03-27 | End: 2019-04-02 | Stop reason: HOSPADM

## 2019-03-26 RX ORDER — VANCOMYCIN/0.9 % SOD CHLORIDE 1.5G/250ML
1500 PLASTIC BAG, INJECTION (ML) INTRAVENOUS EVERY 12 HOURS
Status: COMPLETED | OUTPATIENT
Start: 2019-03-26 | End: 2019-03-27

## 2019-03-26 RX ORDER — PRAVASTATIN SODIUM 40 MG/1
80 TABLET ORAL
Status: DISCONTINUED | OUTPATIENT
Start: 2019-03-26 | End: 2019-04-02 | Stop reason: HOSPADM

## 2019-03-26 RX ORDER — HYDROCHLOROTHIAZIDE 25 MG/1
25 TABLET ORAL DAILY
Status: DISCONTINUED | OUTPATIENT
Start: 2019-03-27 | End: 2019-04-02 | Stop reason: HOSPADM

## 2019-03-26 RX ORDER — INSULIN LISPRO 100 [IU]/ML
INJECTION, SOLUTION INTRAVENOUS; SUBCUTANEOUS
Status: DISCONTINUED | OUTPATIENT
Start: 2019-03-26 | End: 2019-04-02 | Stop reason: HOSPADM

## 2019-03-26 RX ORDER — DEXAMETHASONE SODIUM PHOSPHATE 4 MG/ML
10 INJECTION, SOLUTION INTRA-ARTICULAR; INTRALESIONAL; INTRAMUSCULAR; INTRAVENOUS; SOFT TISSUE EVERY 6 HOURS
Status: COMPLETED | OUTPATIENT
Start: 2019-03-26 | End: 2019-03-27

## 2019-03-26 RX ORDER — SODIUM CHLORIDE 0.9 % (FLUSH) 0.9 %
5-40 SYRINGE (ML) INJECTION AS NEEDED
Status: DISCONTINUED | OUTPATIENT
Start: 2019-03-26 | End: 2019-03-26 | Stop reason: HOSPADM

## 2019-03-26 RX ORDER — PHENYLEPHRINE HCL IN 0.9% NACL 0.4MG/10ML
SYRINGE (ML) INTRAVENOUS AS NEEDED
Status: DISCONTINUED | OUTPATIENT
Start: 2019-03-26 | End: 2019-03-26 | Stop reason: HOSPADM

## 2019-03-26 RX ORDER — FENOFIBRATE 145 MG/1
145 TABLET, COATED ORAL DAILY
Status: DISCONTINUED | OUTPATIENT
Start: 2019-03-27 | End: 2019-04-02 | Stop reason: HOSPADM

## 2019-03-26 RX ORDER — SUCCINYLCHOLINE CHLORIDE 20 MG/ML
INJECTION INTRAMUSCULAR; INTRAVENOUS AS NEEDED
Status: DISCONTINUED | OUTPATIENT
Start: 2019-03-26 | End: 2019-03-26 | Stop reason: HOSPADM

## 2019-03-26 RX ORDER — SODIUM CHLORIDE, SODIUM LACTATE, POTASSIUM CHLORIDE, CALCIUM CHLORIDE 600; 310; 30; 20 MG/100ML; MG/100ML; MG/100ML; MG/100ML
75 INJECTION, SOLUTION INTRAVENOUS CONTINUOUS
Status: DISCONTINUED | OUTPATIENT
Start: 2019-03-26 | End: 2019-03-26 | Stop reason: HOSPADM

## 2019-03-26 RX ORDER — DIPHENHYDRAMINE HYDROCHLORIDE 50 MG/ML
12.5 INJECTION, SOLUTION INTRAMUSCULAR; INTRAVENOUS AS NEEDED
Status: DISCONTINUED | OUTPATIENT
Start: 2019-03-26 | End: 2019-03-26 | Stop reason: HOSPADM

## 2019-03-26 RX ORDER — LIDOCAINE HYDROCHLORIDE 20 MG/ML
INJECTION, SOLUTION EPIDURAL; INFILTRATION; INTRACAUDAL; PERINEURAL AS NEEDED
Status: DISCONTINUED | OUTPATIENT
Start: 2019-03-26 | End: 2019-03-26 | Stop reason: HOSPADM

## 2019-03-26 RX ORDER — ONDANSETRON 2 MG/ML
4 INJECTION INTRAMUSCULAR; INTRAVENOUS
Status: ACTIVE | OUTPATIENT
Start: 2019-03-26 | End: 2019-03-27

## 2019-03-26 RX ORDER — ACETAMINOPHEN 500 MG
1000 TABLET ORAL EVERY 6 HOURS
Status: DISCONTINUED | OUTPATIENT
Start: 2019-03-26 | End: 2019-04-02 | Stop reason: HOSPADM

## 2019-03-26 RX ORDER — DEXTROSE 50 % IN WATER (D50W) INTRAVENOUS SYRINGE
12.5-25 AS NEEDED
Status: DISCONTINUED | OUTPATIENT
Start: 2019-03-26 | End: 2019-04-02 | Stop reason: HOSPADM

## 2019-03-26 RX ORDER — ONDANSETRON 2 MG/ML
4 INJECTION INTRAMUSCULAR; INTRAVENOUS AS NEEDED
Status: DISCONTINUED | OUTPATIENT
Start: 2019-03-26 | End: 2019-03-26 | Stop reason: HOSPADM

## 2019-03-26 RX ORDER — VANCOMYCIN/0.9 % SOD CHLORIDE 1.5G/250ML
1500 PLASTIC BAG, INJECTION (ML) INTRAVENOUS ONCE
Status: COMPLETED | OUTPATIENT
Start: 2019-03-26 | End: 2019-03-26

## 2019-03-26 RX ORDER — CYCLOBENZAPRINE HCL 10 MG
10 TABLET ORAL
Status: DISCONTINUED | OUTPATIENT
Start: 2019-03-26 | End: 2019-04-02 | Stop reason: HOSPADM

## 2019-03-26 RX ORDER — LIDOCAINE HYDROCHLORIDE 10 MG/ML
0.1 INJECTION, SOLUTION EPIDURAL; INFILTRATION; INTRACAUDAL; PERINEURAL AS NEEDED
Status: DISCONTINUED | OUTPATIENT
Start: 2019-03-26 | End: 2019-03-26 | Stop reason: HOSPADM

## 2019-03-26 RX ORDER — PROPOFOL 10 MG/ML
INJECTION, EMULSION INTRAVENOUS AS NEEDED
Status: DISCONTINUED | OUTPATIENT
Start: 2019-03-26 | End: 2019-03-26 | Stop reason: HOSPADM

## 2019-03-26 RX ORDER — NALOXONE HYDROCHLORIDE 0.4 MG/ML
0.4 INJECTION, SOLUTION INTRAMUSCULAR; INTRAVENOUS; SUBCUTANEOUS AS NEEDED
Status: DISCONTINUED | OUTPATIENT
Start: 2019-03-26 | End: 2019-04-02 | Stop reason: HOSPADM

## 2019-03-26 RX ORDER — HYDROMORPHONE HYDROCHLORIDE 1 MG/ML
0.2 INJECTION, SOLUTION INTRAMUSCULAR; INTRAVENOUS; SUBCUTANEOUS
Status: DISCONTINUED | OUTPATIENT
Start: 2019-03-26 | End: 2019-03-26 | Stop reason: HOSPADM

## 2019-03-26 RX ORDER — OXYCODONE HYDROCHLORIDE 5 MG/1
5 TABLET ORAL
Status: DISCONTINUED | OUTPATIENT
Start: 2019-03-26 | End: 2019-04-02 | Stop reason: HOSPADM

## 2019-03-26 RX ORDER — CLINDAMYCIN PHOSPHATE 10 MG/G
GEL TOPICAL 2 TIMES DAILY
Status: DISCONTINUED | OUTPATIENT
Start: 2019-03-26 | End: 2019-04-02 | Stop reason: HOSPADM

## 2019-03-26 RX ORDER — AMOXICILLIN 250 MG
1 CAPSULE ORAL 2 TIMES DAILY
Status: DISCONTINUED | OUTPATIENT
Start: 2019-03-26 | End: 2019-04-02 | Stop reason: HOSPADM

## 2019-03-26 RX ORDER — PROPOFOL 10 MG/ML
INJECTION, EMULSION INTRAVENOUS
Status: DISCONTINUED | OUTPATIENT
Start: 2019-03-26 | End: 2019-03-26 | Stop reason: HOSPADM

## 2019-03-26 RX ADMIN — PROPOFOL 50 MG: 10 INJECTION, EMULSION INTRAVENOUS at 13:04

## 2019-03-26 RX ADMIN — Medication 80 MCG: at 13:35

## 2019-03-26 RX ADMIN — OXYCODONE HYDROCHLORIDE 5 MG: 5 TABLET ORAL at 22:33

## 2019-03-26 RX ADMIN — VANCOMYCIN HYDROCHLORIDE 1500 MG: 10 INJECTION, POWDER, LYOPHILIZED, FOR SOLUTION INTRAVENOUS at 11:17

## 2019-03-26 RX ADMIN — ACETAMINOPHEN 1000 MG: 500 TABLET ORAL at 19:26

## 2019-03-26 RX ADMIN — INSULIN LISPRO 2 UNITS: 100 INJECTION, SOLUTION INTRAVENOUS; SUBCUTANEOUS at 19:25

## 2019-03-26 RX ADMIN — Medication 80 MCG: at 14:02

## 2019-03-26 RX ADMIN — MIDAZOLAM 0.5 MG: 1 INJECTION INTRAMUSCULAR; INTRAVENOUS at 15:05

## 2019-03-26 RX ADMIN — HYDROMORPHONE HYDROCHLORIDE 0.2 MG: 1 INJECTION, SOLUTION INTRAMUSCULAR; INTRAVENOUS; SUBCUTANEOUS at 16:00

## 2019-03-26 RX ADMIN — FENTANYL CITRATE 25 MCG: 50 INJECTION INTRAMUSCULAR; INTRAVENOUS at 16:18

## 2019-03-26 RX ADMIN — PRAVASTATIN SODIUM 80 MG: 40 TABLET ORAL at 21:31

## 2019-03-26 RX ADMIN — MIDAZOLAM 0.5 MG: 1 INJECTION INTRAMUSCULAR; INTRAVENOUS at 15:10

## 2019-03-26 RX ADMIN — DEXMEDETOMIDINE HYDROCHLORIDE 8 MCG: 4 INJECTION, SOLUTION INTRAVENOUS at 13:04

## 2019-03-26 RX ADMIN — ONDANSETRON 4 MG: 2 INJECTION INTRAMUSCULAR; INTRAVENOUS at 14:19

## 2019-03-26 RX ADMIN — HYDROMORPHONE HYDROCHLORIDE 0.2 MG: 1 INJECTION, SOLUTION INTRAMUSCULAR; INTRAVENOUS; SUBCUTANEOUS at 15:15

## 2019-03-26 RX ADMIN — VANCOMYCIN HYDROCHLORIDE 1500 MG: 10 INJECTION, POWDER, LYOPHILIZED, FOR SOLUTION INTRAVENOUS at 22:32

## 2019-03-26 RX ADMIN — MIDAZOLAM HYDROCHLORIDE 2 MG: 1 INJECTION, SOLUTION INTRAMUSCULAR; INTRAVENOUS at 12:24

## 2019-03-26 RX ADMIN — SODIUM CHLORIDE 125 ML/HR: 900 INJECTION, SOLUTION INTRAVENOUS at 16:44

## 2019-03-26 RX ADMIN — MIDAZOLAM 0.5 MG: 1 INJECTION INTRAMUSCULAR; INTRAVENOUS at 15:16

## 2019-03-26 RX ADMIN — FENTANYL CITRATE 25 MCG: 50 INJECTION INTRAMUSCULAR; INTRAVENOUS at 15:30

## 2019-03-26 RX ADMIN — HYDROMORPHONE HYDROCHLORIDE 0.2 MG: 1 INJECTION, SOLUTION INTRAMUSCULAR; INTRAVENOUS; SUBCUTANEOUS at 16:22

## 2019-03-26 RX ADMIN — DEXMEDETOMIDINE HYDROCHLORIDE 4 MCG: 4 INJECTION, SOLUTION INTRAVENOUS at 14:20

## 2019-03-26 RX ADMIN — KETAMINE HYDROCHLORIDE 10 MG: 10 INJECTION, SOLUTION INTRAMUSCULAR; INTRAVENOUS at 13:04

## 2019-03-26 RX ADMIN — FENTANYL CITRATE 50 MCG: 50 INJECTION, SOLUTION INTRAMUSCULAR; INTRAVENOUS at 12:31

## 2019-03-26 RX ADMIN — ACETAMINOPHEN 1000 MG: 10 INJECTION, SOLUTION INTRAVENOUS at 13:55

## 2019-03-26 RX ADMIN — LIDOCAINE HYDROCHLORIDE 80 MG: 20 INJECTION, SOLUTION EPIDURAL; INFILTRATION; INTRACAUDAL; PERINEURAL at 12:31

## 2019-03-26 RX ADMIN — FENTANYL CITRATE 25 MCG: 50 INJECTION INTRAMUSCULAR; INTRAVENOUS at 15:11

## 2019-03-26 RX ADMIN — SODIUM CHLORIDE, SODIUM LACTATE, POTASSIUM CHLORIDE, CALCIUM CHLORIDE: 600; 310; 30; 20 INJECTION, SOLUTION INTRAVENOUS at 14:18

## 2019-03-26 RX ADMIN — FENTANYL CITRATE 50 MCG: 50 INJECTION, SOLUTION INTRAMUSCULAR; INTRAVENOUS at 12:39

## 2019-03-26 RX ADMIN — MIDAZOLAM 0.5 MG: 1 INJECTION INTRAMUSCULAR; INTRAVENOUS at 15:00

## 2019-03-26 RX ADMIN — HYDROMORPHONE HYDROCHLORIDE 0.2 MG: 1 INJECTION, SOLUTION INTRAMUSCULAR; INTRAVENOUS; SUBCUTANEOUS at 15:45

## 2019-03-26 RX ADMIN — Medication 80 MCG: at 14:15

## 2019-03-26 RX ADMIN — HYDROMORPHONE HYDROCHLORIDE 0.2 MG: 1 INJECTION, SOLUTION INTRAMUSCULAR; INTRAVENOUS; SUBCUTANEOUS at 15:30

## 2019-03-26 RX ADMIN — FENTANYL CITRATE 25 MCG: 50 INJECTION INTRAMUSCULAR; INTRAVENOUS at 15:17

## 2019-03-26 RX ADMIN — OXAZEPAM 30 MG: 15 CAPSULE, GELATIN COATED ORAL at 21:31

## 2019-03-26 RX ADMIN — SUCCINYLCHOLINE CHLORIDE 160 MG: 20 INJECTION INTRAMUSCULAR; INTRAVENOUS at 12:32

## 2019-03-26 RX ADMIN — PROPOFOL 150 MG: 10 INJECTION, EMULSION INTRAVENOUS at 12:31

## 2019-03-26 RX ADMIN — DEXAMETHASONE SODIUM PHOSPHATE 10 MG: 4 INJECTION, SOLUTION INTRA-ARTICULAR; INTRALESIONAL; INTRAMUSCULAR; INTRAVENOUS; SOFT TISSUE at 19:27

## 2019-03-26 RX ADMIN — METFORMIN HYDROCHLORIDE 500 MG: 500 TABLET, EXTENDED RELEASE ORAL at 21:30

## 2019-03-26 RX ADMIN — LIDOCAINE HYDROCHLORIDE 60 MG: 20 INJECTION, SOLUTION EPIDURAL; INFILTRATION; INTRACAUDAL; PERINEURAL at 14:26

## 2019-03-26 RX ADMIN — SODIUM CHLORIDE, SODIUM LACTATE, POTASSIUM CHLORIDE, AND CALCIUM CHLORIDE 75 ML/HR: 600; 310; 30; 20 INJECTION, SOLUTION INTRAVENOUS at 11:15

## 2019-03-26 RX ADMIN — PROPOFOL 150 MCG/KG/MIN: 10 INJECTION, EMULSION INTRAVENOUS at 12:40

## 2019-03-26 RX ADMIN — SODIUM CHLORIDE, SODIUM LACTATE, POTASSIUM CHLORIDE, CALCIUM CHLORIDE: 600; 310; 30; 20 INJECTION, SOLUTION INTRAVENOUS at 12:23

## 2019-03-26 RX ADMIN — OXYCODONE HYDROCHLORIDE 10 MG: 5 TABLET ORAL at 19:26

## 2019-03-26 RX ADMIN — KETAMINE HYDROCHLORIDE 20 MG: 10 INJECTION, SOLUTION INTRAMUSCULAR; INTRAVENOUS at 12:46

## 2019-03-26 NOTE — ANESTHESIA POSTPROCEDURE EVALUATION
Procedure(s): 
C5-C6 C6-C7 ANTERIOR CERVICAL DISECTOMY WITH FUSION. general 
 
Anesthesia Post Evaluation Patient location during evaluation: PACU Patient participation: complete - patient participated Level of consciousness: awake Pain management: adequate Airway patency: patent Anesthetic complications: no 
Cardiovascular status: acceptable Respiratory status: acceptable Hydration status: acceptable Comments: Seen and reorienting after surgery, pending discharge Post anesthesia nausea and vomiting:  none Vitals Value Taken Time /82 3/26/2019  3:55 PM  
Temp 36.4 °C (97.6 °F) 3/26/2019  2:44 PM  
Pulse 78 3/26/2019  3:59 PM  
Resp 14 3/26/2019  3:59 PM  
SpO2 100 % 3/26/2019  3:59 PM  
Vitals shown include unvalidated device data.

## 2019-03-26 NOTE — ANESTHESIA PREPROCEDURE EVALUATION
Anesthetic History No history of anesthetic complications Review of Systems / Medical History Patient summary reviewed, nursing notes reviewed and pertinent labs reviewed Pulmonary Asthma Comments: Chronic cough Neuro/Psych Psychiatric history Cardiovascular Within defined limits Hypertension Exercise tolerance: <4 METS 
  
GI/Hepatic/Renal 
  
 
 
 
Liver disease Comments: Fatty liver Endo/Other Within defined limits Diabetes: well controlled, type 2 Obesity Other Findings Comments: H/o ITP Chronic pain Physical Exam 
 
Airway Mallampati: II 
TM Distance: > 6 cm Neck ROM: normal range of motion Mouth opening: Normal 
 
 Cardiovascular Regular rate and rhythm,  S1 and S2 normal,  no murmur, click, rub, or gallop Dental 
 
Dentition: Caps/crowns Pulmonary Breath sounds clear to auscultation Abdominal 
GI exam deferred Other Findings Anesthetic Plan ASA: 3 Anesthesia type: general 
 
 
 
 
Induction: Intravenous Anesthetic plan and risks discussed with: Patient

## 2019-03-26 NOTE — BRIEF OP NOTE
BRIEF OPERATIVE NOTE    Date of Procedure: 3/26/2019   Preoperative Diagnosis: CERVICAL STENOSIS, CERVICALGIA, RADICULITIS  Postoperative Diagnosis: CERVICAL STENOSIS, CERVICALGIA, RADICULITIS    Procedure(s):  C5-C6 C6-C7 ANTERIOR CERVICAL DISECTOMY WITH FUSION  Surgeon(s) and Role: Luc Rose MD - Primary         Surgical Assistant: Brayden Maurer PA-C    Surgical Staff:  Circ-1: Noemy Swanson RN  Circ-Relief: Rahul Sahni RN; Ramona Cam RN  Physician Assistant: MARILYN Siddiqui  Scrub Tech-1: Ayan Pryor  Event Time In Time Out   Incision Start 1300    Incision Close       Anesthesia: General   Estimated Blood Loss: minimal  Specimens: * No specimens in log *   Findings: stenosis   Complications: none  Implants:   Implant Name Type Inv.  Item Serial No.  Lot No. LRB No. Used Action   GRAFT BNE ELITE EMIR SM --  - [id number]  GRAFT BNE ELITE EMIR SM --  [id number] MUSCULOSKELETAL TRANS N/A N/A 1 Implanted   GRAFT BNE ELITE EMIR SM --  - [id number]  GRAFT BNE ELITE EMIR SM --  [id number] MUSCULOSKELETAL TRANS N/A N/A 1 Implanted   MEMBRANE AMNIOTIC WND 3X4CM Twilla Mingle  MEMBRANE AMNIOTIC WND 3X4CM -- VERSASHIELD [id number] ORTHOFIX INC N/A N/A 1 Implanted   Spacer   N/A MEDTRONIC 56FH N/A 1 Implanted   7mm x 16mm x 14mm spacer   NA  13FN N/A 1 Implanted   PLATE SPNE ANT ATLANTIS 40MM --  - SN/A  PLATE SPNE ANT ATLANTIS 40MM --  N/A MEDTRONIC SOFAMOR DANEK N/A N/A 1 Implanted   SCR SPNE VA SD 4X14MM --  - SN/A  SCR SPNE VA SD 4X14MM --  N/A MEDTRONIC SOFAMOR DANEK N/A N/A 6 Implanted

## 2019-03-26 NOTE — PERIOP NOTES
TRANSFER - OUT REPORT:    Verbal report given to Dharmesh Andujar (name) on Rosi Chaidez  being transferred to [id number] (unit) for routine post - op       Report consisted of patients Situation, Background, Assessment and   Recommendations(SBAR). Time Pre op antibiotic given:1117  Anesthesia Stop time: 8737  Mejia Present on Transfer to floor:N/A  Order for Mejia on Chart:N/A  Discharge Prescriptions with Chart:N/A    Information from the following report(s) SBAR, OR Summary, Procedure Summary, Intake/Output and MAR was reviewed with the receiving nurse. Opportunity for questions and clarification was provided. Is the patient on 02? YES       L/Min 2       Other N/A    Is the patient on a monitor? NO    Is the nurse transporting with the patient? NO    Surgical Waiting Area notified of patient's transfer from PACU? YES      The following personal items collected during your admission accompanied patient upon transfer:   Dental Appliance:    Vision:    Hearing Aid:    Jewelry:    Clothing: Clothing: Other (comment)(clothing bag to pacu)  Other Valuables: Other Valuables: Other (comment), Cane(cane to pacu.  cervical collar to OR)  Valuables sent to safe:

## 2019-03-27 ENCOUNTER — APPOINTMENT (OUTPATIENT)
Dept: GENERAL RADIOLOGY | Age: 74
DRG: 472 | End: 2019-03-27
Attending: ORTHOPAEDIC SURGERY
Payer: COMMERCIAL

## 2019-03-27 ENCOUNTER — APPOINTMENT (OUTPATIENT)
Dept: CT IMAGING | Age: 74
DRG: 472 | End: 2019-03-27
Payer: COMMERCIAL

## 2019-03-27 LAB
ANION GAP SERPL CALC-SCNC: 8 MMOL/L (ref 5–15)
BUN SERPL-MCNC: 12 MG/DL (ref 6–20)
BUN/CREAT SERPL: 13 (ref 12–20)
CALCIUM SERPL-MCNC: 9.1 MG/DL (ref 8.5–10.1)
CHLORIDE SERPL-SCNC: 106 MMOL/L (ref 97–108)
CO2 SERPL-SCNC: 26 MMOL/L (ref 21–32)
CREAT SERPL-MCNC: 0.92 MG/DL (ref 0.55–1.02)
ERYTHROCYTE [DISTWIDTH] IN BLOOD BY AUTOMATED COUNT: 13.4 % (ref 11.5–14.5)
GLUCOSE BLD STRIP.AUTO-MCNC: 237 MG/DL (ref 65–100)
GLUCOSE BLD STRIP.AUTO-MCNC: 238 MG/DL (ref 65–100)
GLUCOSE BLD STRIP.AUTO-MCNC: 249 MG/DL (ref 65–100)
GLUCOSE BLD STRIP.AUTO-MCNC: 269 MG/DL (ref 65–100)
GLUCOSE SERPL-MCNC: 261 MG/DL (ref 65–100)
HCT VFR BLD AUTO: 40.5 % (ref 35–47)
HGB BLD-MCNC: 13.1 G/DL (ref 11.5–16)
MCH RBC QN AUTO: 33.2 PG (ref 26–34)
MCHC RBC AUTO-ENTMCNC: 32.3 G/DL (ref 30–36.5)
MCV RBC AUTO: 102.8 FL (ref 80–99)
NRBC # BLD: 0 K/UL (ref 0–0.01)
NRBC BLD-RTO: 0 PER 100 WBC
PLATELET # BLD AUTO: 298 K/UL (ref 150–400)
PMV BLD AUTO: 10 FL (ref 8.9–12.9)
POTASSIUM SERPL-SCNC: 4 MMOL/L (ref 3.5–5.1)
RBC # BLD AUTO: 3.94 M/UL (ref 3.8–5.2)
SERVICE CMNT-IMP: ABNORMAL
SODIUM SERPL-SCNC: 140 MMOL/L (ref 136–145)
WBC # BLD AUTO: 7.9 K/UL (ref 3.6–11)

## 2019-03-27 PROCEDURE — 74011250636 HC RX REV CODE- 250/636: Performed by: PHYSICIAN ASSISTANT

## 2019-03-27 PROCEDURE — 82962 GLUCOSE BLOOD TEST: CPT

## 2019-03-27 PROCEDURE — 72040 X-RAY EXAM NECK SPINE 2-3 VW: CPT

## 2019-03-27 PROCEDURE — 74011250637 HC RX REV CODE- 250/637: Performed by: PHYSICIAN ASSISTANT

## 2019-03-27 PROCEDURE — 97116 GAIT TRAINING THERAPY: CPT

## 2019-03-27 PROCEDURE — 97161 PT EVAL LOW COMPLEX 20 MIN: CPT

## 2019-03-27 PROCEDURE — 80048 BASIC METABOLIC PNL TOTAL CA: CPT

## 2019-03-27 PROCEDURE — 97530 THERAPEUTIC ACTIVITIES: CPT

## 2019-03-27 PROCEDURE — 85027 COMPLETE CBC AUTOMATED: CPT

## 2019-03-27 PROCEDURE — 74011636637 HC RX REV CODE- 636/637: Performed by: NURSE PRACTITIONER

## 2019-03-27 PROCEDURE — 65270000029 HC RM PRIVATE

## 2019-03-27 PROCEDURE — 74011636637 HC RX REV CODE- 636/637: Performed by: PHYSICIAN ASSISTANT

## 2019-03-27 PROCEDURE — 36415 COLL VENOUS BLD VENIPUNCTURE: CPT

## 2019-03-27 RX ORDER — IPRATROPIUM BROMIDE AND ALBUTEROL SULFATE 2.5; .5 MG/3ML; MG/3ML
3 SOLUTION RESPIRATORY (INHALATION)
Status: DISCONTINUED | OUTPATIENT
Start: 2019-03-27 | End: 2019-04-02 | Stop reason: HOSPADM

## 2019-03-27 RX ORDER — OXYCODONE HYDROCHLORIDE 5 MG/1
5-10 TABLET ORAL
Qty: 50 TAB | Refills: 0 | Status: SHIPPED | OUTPATIENT
Start: 2019-03-27 | End: 2019-03-30

## 2019-03-27 RX ORDER — CYCLOBENZAPRINE HCL 10 MG
10 TABLET ORAL
Qty: 60 TAB | Refills: 0 | Status: SHIPPED | OUTPATIENT
Start: 2019-03-27 | End: 2019-04-09 | Stop reason: ALTCHOICE

## 2019-03-27 RX ORDER — INSULIN GLARGINE 100 [IU]/ML
10 INJECTION, SOLUTION SUBCUTANEOUS DAILY
Status: DISCONTINUED | OUTPATIENT
Start: 2019-03-27 | End: 2019-03-28

## 2019-03-27 RX ADMIN — CARVEDILOL 3.12 MG: 3.12 TABLET, FILM COATED ORAL at 09:07

## 2019-03-27 RX ADMIN — SERTRALINE HYDROCHLORIDE 100 MG: 50 TABLET ORAL at 09:05

## 2019-03-27 RX ADMIN — OXYCODONE HYDROCHLORIDE 5 MG: 5 TABLET ORAL at 17:10

## 2019-03-27 RX ADMIN — DEXAMETHASONE SODIUM PHOSPHATE 10 MG: 4 INJECTION, SOLUTION INTRA-ARTICULAR; INTRALESIONAL; INTRAMUSCULAR; INTRAVENOUS; SOFT TISSUE at 11:40

## 2019-03-27 RX ADMIN — DEXAMETHASONE SODIUM PHOSPHATE 10 MG: 4 INJECTION, SOLUTION INTRA-ARTICULAR; INTRALESIONAL; INTRAMUSCULAR; INTRAVENOUS; SOFT TISSUE at 06:41

## 2019-03-27 RX ADMIN — SODIUM CHLORIDE 125 ML/HR: 900 INJECTION, SOLUTION INTRAVENOUS at 02:39

## 2019-03-27 RX ADMIN — BENZOCAINE AND MENTHOL 1 LOZENGE: 15; 3.6 LOZENGE ORAL at 17:06

## 2019-03-27 RX ADMIN — BENZOCAINE AND MENTHOL 1 LOZENGE: 15; 3.6 LOZENGE ORAL at 11:40

## 2019-03-27 RX ADMIN — INSULIN LISPRO 3 UNITS: 100 INJECTION, SOLUTION INTRAVENOUS; SUBCUTANEOUS at 17:06

## 2019-03-27 RX ADMIN — FENOFIBRATE 145 MG: 145 TABLET ORAL at 09:03

## 2019-03-27 RX ADMIN — BENZOCAINE AND MENTHOL 1 LOZENGE: 15; 3.6 LOZENGE ORAL at 15:45

## 2019-03-27 RX ADMIN — ACETAMINOPHEN 1000 MG: 500 TABLET ORAL at 08:24

## 2019-03-27 RX ADMIN — INSULIN GLARGINE 10 UNITS: 100 INJECTION, SOLUTION SUBCUTANEOUS at 15:50

## 2019-03-27 RX ADMIN — Medication 10 ML: at 22:17

## 2019-03-27 RX ADMIN — ACETAMINOPHEN 1000 MG: 500 TABLET ORAL at 15:49

## 2019-03-27 RX ADMIN — OXYCODONE HYDROCHLORIDE 5 MG: 5 TABLET ORAL at 02:41

## 2019-03-27 RX ADMIN — DEXAMETHASONE SODIUM PHOSPHATE 10 MG: 4 INJECTION, SOLUTION INTRA-ARTICULAR; INTRALESIONAL; INTRAMUSCULAR; INTRAVENOUS; SOFT TISSUE at 00:36

## 2019-03-27 RX ADMIN — BENZONATATE 200 MG: 100 CAPSULE ORAL at 09:05

## 2019-03-27 RX ADMIN — OXYCODONE HYDROCHLORIDE 5 MG: 5 TABLET ORAL at 11:40

## 2019-03-27 RX ADMIN — METFORMIN HYDROCHLORIDE 500 MG: 500 TABLET, EXTENDED RELEASE ORAL at 22:17

## 2019-03-27 RX ADMIN — ACETAMINOPHEN 1000 MG: 500 TABLET ORAL at 22:17

## 2019-03-27 RX ADMIN — Medication 10 ML: at 06:41

## 2019-03-27 RX ADMIN — BENZOCAINE AND MENTHOL 1 LOZENGE: 15; 3.6 LOZENGE ORAL at 09:05

## 2019-03-27 RX ADMIN — OXYCODONE HYDROCHLORIDE 5 MG: 5 TABLET ORAL at 06:48

## 2019-03-27 RX ADMIN — BUPROPION HYDROCHLORIDE 150 MG: 150 TABLET, EXTENDED RELEASE ORAL at 09:11

## 2019-03-27 RX ADMIN — OXAZEPAM 30 MG: 15 CAPSULE, GELATIN COATED ORAL at 22:17

## 2019-03-27 RX ADMIN — ACETAMINOPHEN 1000 MG: 500 TABLET ORAL at 02:39

## 2019-03-27 RX ADMIN — PRAVASTATIN SODIUM 80 MG: 40 TABLET ORAL at 22:17

## 2019-03-27 RX ADMIN — INSULIN LISPRO 5 UNITS: 100 INJECTION, SOLUTION INTRAVENOUS; SUBCUTANEOUS at 11:30

## 2019-03-27 RX ADMIN — METFORMIN HYDROCHLORIDE 500 MG: 500 TABLET, EXTENDED RELEASE ORAL at 08:24

## 2019-03-27 RX ADMIN — HYDROCHLOROTHIAZIDE 25 MG: 25 TABLET ORAL at 09:07

## 2019-03-27 RX ADMIN — INSULIN LISPRO 3 UNITS: 100 INJECTION, SOLUTION INTRAVENOUS; SUBCUTANEOUS at 06:48

## 2019-03-27 NOTE — OP NOTES
[address]   OPERATIVE REPORT    Name:  Iker Givens  MR#:  [id number]  :  1945  ACCOUNT #:  [de-identified]  DATE OF SERVICE:  2019    PREOPERATIVE DIAGNOSES:  Cervical spondylosis at C5-6, C6-7 cervical stenosis. POSTOPERATIVE DIAGNOSES:  Cervical spondylosis at C5-6, C6-7 cervical stenosis. PROCEDURE PERFORMED:  Anterior cervical discectomy C5-6, anterior discectomy C6-7, anterior interbody fusion C5-6, C6-7, anterior plate fixation C5, C6, C7.    SURGEON:  Jude Freeman MD    ASSISTANT:  Chano Judge PA-C. ANESTHESIA:  General    COMPLICATIONS:  None. SPECIMENS REMOVED:  None. IMPLANTS:  Medtronic Elite. ESTIMATED BLOOD LOSS:  Minimal.    INDICATIONS:  This is a pleasant female, 69year-old, chronic neck pain, left arm pain, also has signs of worsening cervical myelopathy. Had failed conservative treatment, understood the risks and benefits. She has stenosis at C5-6, C6-7 cord compression. PROCEDURE:  The patient was brought to the operative suite, underwent general anesthesia without difficulty. Placed in the supine position. 10-pound traction across the head with chin strap. Preoperative neuromonitoring was placed, baselines obtained, which remained stable throughout the surgical procedure. After prepping and draping, time-out was performed. Transverse incision was made at approximately at the C5-6 cricoid cartilage level. Dissection was carried down to the platysma. We split longitudinally and bluntly dissected medially, sternocleidomastoid down to the deep cervical fascia. We carefully bluntly dissected the deep cervical fascia off and took an x-ray to confirm the C5-6 level. Multiple anterior osteophytes were revealed and these were taken down with the Midas bur as well as the rongeur, longus coli were elevated, radiolucent retractors were placed.   Starting at the C6-7 disc space, we did annulotomy followed by distraction of Yanelis Min pins and did a complete discectomy with removal of the remainder of the disc material as well as cartilaginous endplates. This was done all the way down to the posterior longitudinal ligament. Posterior vertebral osteophytes were then resected as well and this allowed to decompress the central canal, bilateral foraminotomies with undercutting of the uncinate processes with #1, #2 Kerrison for decompression of the foraminal region after which a small blunt nerve hook could be passed through the foramen without any neural compression. We then did a similar decompression at the C5-6  level using distraction with [place] pins and distracted with the posterior osteophytes causing the spinal cord compression. These were resected with Midas bur #1, #2 Kerrison for decompression of the central canal followed by bilateral foraminotomies with undercutting of the uncinate processes with #1, #2 Kerrison. Hemostasis was with bipolar cautery. Neuromonitoring was stable. We then decorticated the endplate to lightly bleeding bone past the sclerotic layer. We did trial spacers with a Medtronic PTT plate and screw fixation. The trial fit was 10 mm height 16 x 14 mm length and width with good  distraction of the disk space. We then rasped the endplates to lightly bleeding bone. We tightened the graft with Eufemia allograft then impacted in place with 2 mm countersinking. Small nerve hook could be passed along the graft without any impingement. Wound was thoroughly irrigated. We then placed a 40-mm Medtronic Elite plate on the anterior cervical spine, temporary fixation with a threaded pin followed by 14 mm screws at C5, C6, and C7 with good fixation obtained. Wounds thoroughly irrigated. Neuromonitoring was stable. Weight was removed off the head, #7 flat VICKY drain placed, 2-0 Vicryl on subcutaneous area and a ZipLine on the skin.       MD AWILDA [id number]  D:  03/26/2019 14:23  T: 03/27/2019 6:53  JOB #:  [id number]

## 2019-03-27 NOTE — PROGRESS NOTES
Care Management Interventions  PCP Verified by CM: Yes  Palliative Care Criteria Met (RRAT>21 & CHF Dx)?: No  Transition of Care Consult (CM Consult): Discharge Planning  MyChart Signup: No  Discharge Durable Medical Equipment: No  Health Maintenance Reviewed: Yes  Physical Therapy Consult: Yes  Occupational Therapy Consult: Yes  Speech Therapy Consult: No  Current Support Network: Own Home  Confirm Follow Up Transport: Family  Plan discussed with Pt/Family/Caregiver: Yes  Freedom of Choice Offered: Yes  [place] Resource Information Provided?: No  Discharge Location  Discharge Placement: Unable to determine at this time    Reason for Admission:   C5-C6 C6-C7 ANTERIOR CERVICAL DISECTOMY WITH FUSION                   RRAT Score:   39               Resources/supports as identified by patient/family:   Patient lives alone, son lives nearby. She has supportive friends, but states she cannot stay with them at discharge. Patient was supposed to stay with a friend, but states she 'still has a cough' and the friend has 8 dogs. Top Challenges facing patient (as identified by patient/family and CM): Finances/Medication cost?     No concerns               Transportation? DOES NOT WANT TO GO BY AMBULANCE AT DISCHARGE. Patient stated she has had to pay a large bill for an ambulance in the past and will refuse this type of transport. Patients' friend will transport her. Support system or lack thereof? See above                     Living arrangements? See above              Self-care/ADLs/Cognition? Alert and oriented x4, independent with ADLs and IADLs prior to admission, was having incontinence prior to admission.            Current Advanced Directive/Advance Care Plan:                            Plan for utilizing home health:    TBD, patient needs rehab and does not feel she can go home with home health                      Likelihood of readmission: low Transition of Care Plan:      Cm met with patient at the bedside to discuss discharge planning. Patient states she needs to go to rehab, PT is in agreement with this. Cm offered choice and she would like to go to [facility]. Cm will send a referral to them for evaluation. Cm notified NP that we would need an OT consult as well. Cm informed patient there is a strong chance that her insurance will deny, as they are [employer]. Patient had a bad experience at [address], claiming they did not change her dressings and does not wish to return. When alternate options were discussed for plan B, patient stated she would have to go home with home health and hire private duty caregivers. Patient asked if I could arrange the caregivers for her, cm informed her I could not because it is private pay, but would be happy to give her a list of agencies. Referral sent to [facility]. Will follow.   Nelly SAN, ACM

## 2019-03-27 NOTE — PROGRESS NOTES
Orthopedic Spine Progress Note  Sohail Driver, AGACNP-BC  Work Cell: [phone]    Post Op Day: 1 Day Post-Op    March 27, 2019 1:10 PM     Roberto Mcintosh    HPI:      Roberto Mcintosh is a 69 y.o. female with prior medical history notable for HLD, AVN of hip, anxiety, CKD II, DMII, obesity, PMR, IBS, depression, rosacea, dyspnea, and cervical stenosis. She has history of a prior L2-S1 lumbar fusion in 2008 for lumbar stenosis and scoliosis. She later established with Dr. Tarik Rios and underwent a L1-2 laminectomy with nba osteotomy/extension of fusion to T10 in July 2018 due to progressive lumbar neurogenic claudication. This surgery was complicated by a incidental durotomy primarily repaired intraoperatively. She did well for a few months but unfortunately developed an unsteady gait and was falling. She had some episodes of urinary incontinence in February. She was losing some dexterity in her hands. MRI of the cervical spine demonstrates severe spondylosis at C5-6 and C6-7; mild spondylosis at C4-5. Severe stenosis C5-6 and C6-7 with foraminal stenosis and cord compression. After a discussion of the risks, benefits, and alternatives, Roberto Mcintosh consented to undergo a Procedure(s):  C5-C6 C6-C7 ANTERIOR CERVICAL DISECTOMY WITH FUSION    Subjective     Ms. Rosa Ocampo has a litany of concerns. She tells me she gets short of breath with exertion and this has worsened since December. She has a cough. She has malaise. She has chronic diffuse mid-abdominal soreness in a band like distribution. She also complains progressive hip pain due to AVN. She has been researching on [employer] and thinks she has Lyme's disease. In regards to her neck- her pain is well managed and she has no vocal hoarseness or dysphagia. Sensorimotor function stable. She denies h/a, dizziness, blurred vision, cp, palpitations, fever, chills, or n/v.               Objective:     Physical Exam:  General:  Alert and oriented. No acute distress. Respiratory:  Breathing unlabored. No wheezing or rhonchi. Dry cough   Abdomen:   Extremities: Soft, non-tender, non-distended  No evidence of swelling. Pedal pulses palpable. Neurologic:      Musculoskeletal:  Speech fluent. Face symmetric. No new motor deficits. [place]/fc/silt. Motor: unchanged C5-T1 and L2-S1. Negative gonzalez's. No tremor. Negative clonus. Calves soft, nontender upon palpation and with passive stretch. .  Moves both upper and lower extremities. Incision: clean, dry, and intact. No significant erythema or swelling. No active drainage noted.              Vital Signs:    Patient Vitals for the past 8 hrs:   BP Temp Pulse Resp SpO2   19 1126 -- -- 97 -- 97 %   19 1118 -- -- 72 -- 99 %   19 0801 129/76 98.2 °F (36.8 °C) 70 16 95 %     Temp (24hrs), Av.8 °F (36.6 °C), Min:97.3 °F (36.3 °C), Max:98.8 °F (37.1 °C)      Intake/Output:   0701 -  1900  In: -   Out: 500 [Urine:500]   190 -  0700  In: 1700 [I.V.:1700]  Out: 2490 [Urine:2300; Drains:90]    Pain Control:   Pain Assessment  Pain Scale 1: Numeric (0 - 10)  Pain Intensity 1: 3  Pain Onset 1: postop  Pain Location 1: Neck  Pain Orientation 1: Posterior  Pain Description 1: Aching  Pain Intervention(s) 1: Medication (see MAR)    LAB:    Recent Labs     19  1234   HCT 40.5   HGB 13.1     Lab Results   Component Value Date/Time    Sodium 141 2019 09:55 AM    Potassium 4.1 2019 09:55 AM    Chloride 104 2019 09:55 AM    CO2 30 2019 09:55 AM    Glucose 228 (H) 2019 09:55 AM    BUN 21 (H) 2019 09:55 AM    Creatinine 0.88 2019 09:55 AM    Calcium 9.5 2019 09:55 AM       PT/OT:   Gait:  Gait  Base of Support: Narrowed, Center of gravity altered(anterior COG)  Speed/Vivian: Shuffled, Fluctuations  Step Length: Left shortened, Right shortened  Gait Abnormalities: Antalgic, Ataxic, Altered arm swing, Decreased step clearance, Path deviations, Shuffling gait, Trunk sway increased  Ambulation - Level of Assistance: Minimal assistance, Moderate assistance  Distance (ft): 60 Feet (ft)(x 3)  Assistive Device: Gait belt, Brace/Splint                   Assessment/Plan      1. Cervical myelopathy, 1 Day Post-Op Procedure(s):  C5-C6 C6-C7 ANTERIOR CERVICAL DISECTOMY WITH FUSION   -Continue PT/OT   -Pain control- APAP, PRN oxycodone,   -decadron 10 mg x4 doses postop   -episodes of incontinence (not new)   -Incentive Spirometer   -Tolerating diet. Continue bowel regimen   -VTE Prophylaxes - TEDS &SCDs    2. Chronic dyspnea   -Patient tells me she has a year of sob but this has worsened over since December.    -patient states her recent CXR, EKG ordered by PCP outpatient was unremarkable.    -no recent echo    -sats stable on room air   -check CT of chest as dyspnea noted on exertion with PT today. 3. Cough   -may be bronchospastic   -start duonebs   -tessalon PRN    4. DMII   -hgba1c 7.3%   -fbg between 238 and 269 postop   -home metformin restarted   -continue accuchecks, diabetic diet, humalog SSI   -she is on decadron IV postop so will start long-acting insulin today due to persistent hyperglycemia   -educated regarding tight glycemic control postop    5. Hx of ITP   -followed o/p by Dr. Dana Pelaez   -plts 298 postop    6. Anxiety and depression   -continue Serax, Zoloft, and Wellbutrin from home     7. Hypertension   -bp stable    8.  Joint pain, malaise   -needs rheumatology workup outpatient    Plan above discussed with Dr. Sandra Less    Discharge To:  Rehab recommended by PT    Signed By: Bo Massey NP

## 2019-03-27 NOTE — DIABETES MGMT
DTC Progress Note    Recommendations/ Comments: Chart reviewed due to hyperglycemia. Blood sugars >200. Noted Lantus 10 units ordered today . DTC to continue to follow. Current hospital DM medication: correction scale Humalog with normal sensitivity, Lantus 10 units and Metformin 500 mg BID. Chart reviewed on Mela Sandoval. Patient is a 69 y.o. female with known diabetes on Metformin 500 mg BID at home. A1c:   Lab Results   Component Value Date/Time    Hemoglobin A1c 7.3 (H) 03/20/2019 09:55 AM    Hemoglobin A1c 7.4 (H) 01/14/2019 08:56 AM       Recent Glucose Results:   Lab Results   Component Value Date/Time     (H) 03/27/2019 12:34 PM    GLUCPOC 269 (H) 03/27/2019 11:20 AM    GLUCPOC 249 (H) 03/27/2019 06:26 AM    GLUCPOC 238 (H) 03/27/2019 12:46 AM        Lab Results   Component Value Date/Time    Creatinine 0.92 03/27/2019 12:34 PM     Estimated Creatinine Clearance: 58.7 mL/min (based on SCr of 0.92 mg/dL). Active Orders   Diet    DIET DIABETIC CONSISTENT CARB Regular; No Conc. Sweets        PO intake: No data found. Will continue to follow as needed.     Thank you    Alicia Barnes RD, CDE      Time spent: 5 minutes

## 2019-03-27 NOTE — PROGRESS NOTES
Problem: Mobility Impaired (Adult and Pediatric)  Goal: *Acute Goals and Plan of Care (Insert Text)  Description  Physical Therapy Goals  Initiated 3/27/2019    1. Patient will move from supine to sit and sit to supine , scoot up and down and roll side to side in bed with modified independence within 4 days. 2. Patient will perform sit to stand with modified independence within 4 days. 3. Patient will ambulate with modified independence for 150 feet with the least restrictive device within 4 days. 4. Patient will ascend/descend 13 stairs with 1 handrail(s) with modified independence within 4 days. 5. Patient will verbalize and demonstrate understanding of spinal precautions (No bending, lifting greater than 5 lbs, or twisting; log-roll technique; frequent repositioning as instructed) within 4 days. Outcome: Progressing Towards Goal    PHYSICAL THERAPY EVALUATION  Patient: Elton Contreras (74 y.o. female)  Date: 3/27/2019  Primary Diagnosis: Cervical stenosis of spine [M48.02]  Procedure(s) (LRB):  C5-C6 C6-C7 ANTERIOR CERVICAL DISECTOMY WITH FUSION (N/A) 1 Day Post-Op   Precautions:   Fall(cervical, brace)    ASSESSMENT :  Based on the objective data described below, the patient presents with generalized weakness, decreased balance, impaired gait mechanics, reports of incontinence, and overall decreased independence from baseline following admission for C5-7 ACDF POD 1. PTA patient lived alone and reports she was still working in [address] [place] [address]. She reports intermittently requiring use of a cane for ambulation for safety. She's overall CGA for transfers although requires up to mod A for ambulation. Gait mechanics impaired with ataxia, anterior COG, path deviations, and decreased heel strike bilaterally noted. Patient with labored breathing post ambulation although O2 sats 97% on RA and HR 97bpm.  Reviewed AROM of B UE's to assist in shoulder pain management. Remained OOB in chair at end of session with needs met. Patient will require OT consult-case management and NP aware. Recommending IP rehab at discharge to maximize functional gains and return to living alone. Patient will benefit from skilled intervention to address the above impairments. Patient?s rehabilitation potential is considered to be Good  Factors which may influence rehabilitation potential include:   ? None noted  ? Mental ability/status  ? Medical condition  ? Home/family situation and support systems  ? Safety awareness  ? Pain tolerance/management  ? Other:      PLAN :  Recommendations and Planned Interventions:  ?           Bed Mobility Training             ? Neuromuscular Re-Education  ? Transfer Training                   ? Orthotic/Prosthetic Training  ? Gait Training                         ? Modalities  ? Therapeutic Exercises           ? Edema Management/Control  ? Therapeutic Activities            ? Patient and Family Training/Education  ? Other (comment):    Frequency/Duration: Patient will be followed by physical therapy  twice daily to address goals. Discharge Recommendations: Inpatient Rehab  Further Equipment Recommendations for Discharge: TBD      SUBJECTIVE:   Patient stated ? I have to incontinence which I was hoping would resolve after surgery but it hasn't.?    OBJECTIVE DATA SUMMARY:   HISTORY:    Past Medical History:   Diagnosis Date    Abnormal LFTs 08/24/2017    FATTY LIVER    Arthritis     OSTEO    Avascular necrosis of hip ([facility] [address] 08/24/2017    BILAT HIPS    Breast CA ([facility] [address] 1989, 2001    BILATERAL; SURGERY, CHEMO    Chronic pain     CKD (chronic kidney disease), stage II 8/24/2017    Coagulation disorder ([facility] [address] 1984    ITP  (DR CHU BRADSHAW) - PLATELETS DROPPED TO 5K    Depression     Diabetes ([facility] [address]     TYPE 2; NIDDM    DJD (degenerative joint disease), multiple sites 8/24/2017    GI bleed 2008    HEMORRHOIDS Hyperlipemia     Hypertension with renal disease 8/24/2017    IBS (irritable bowel syndrome) 8/24/2017    Ill-defined condition 2015    PNEUMONIA X2 - HOSPITALIZED IN 2015    Liver disease     FATTY LIVER    Myalgia 8/24/2017    On statin therapy 8/24/2017    Overactive bladder 8/24/2017    Pneumonia 04/2015    HOSPITALIZED 3 WEEKS.     Polymyalgia rheumatica ([address] 8/24/2017    Prophylactic antibiotic 8/24/2017    Rosacea      Past Surgical History:   Procedure Laterality Date    BREAST SURGERY PROCEDURE UNLISTED  1993, 2002    RECONSTRUCTION X2    HX APPENDECTOMY  1950    HX BREAST BIOPSY Bilateral     HX CATARACT REMOVAL Bilateral     HX COLONOSCOPY      HX ENDOSCOPY      HX GI      COLONOSCOPY    HX HEENT      WISDOM TEETH    HX HYSTERECTOMY  2000S    HX MASTECTOMY Right 1989    HX MASTECTOMY Left 2001    HX ORTHOPAEDIC  2008    LUMBAR FUSION    HX ORTHOPAEDIC  2018    RE-DO LUMBAR FUSION, AND ADDED THORACIC FUSION    HX TUBAL LIGATION  1981    HX UROLOGICAL  2000s    BLADDER SLING    TOTAL HIP ARTHROPLASTY Left 11/16/2016    ANTERIOR APPROACH, DR Gwendolyn De La Torre; (POSTOP: STANDS ONE INCH TALLER ON LEFT FOOT)    TOTAL HIP ARTHROPLASTY Right 12/2016    ANTERIOR APPROACH (DR Gwendolyn De La Torre)     Prior Level of Function/Home Situation: lived alone, overall independent but gradual decline in function, driving and working in IT  Personal factors and/or comorbidities impacting plan of care:     Home Situation  Home Environment: Private residence  # Steps to Enter: 5  Rails to Enter: Yes  Wheelchair Ramp: Yes  One/Two Story Residence: Two story  # of Interior Steps: 15  Interior Rails: Both  Living Alone: Yes  Support Systems: None  Current DME Used/Available at Home: Cane, quad, Walker, rolling, Wheelchair    EXAMINATION/PRESENTATION/DECISION MAKING:   Critical Behavior:  Neurologic State: Alert  Orientation Level: Oriented X4        Hearing:     Skin:    Edema:   Range Of Motion:  AROM: Generally decreased, functional           PROM: Generally decreased, functional           Strength:    Strength: Generally decreased, functional                    Tone & Sensation:                                  Coordination:     Vision:      Functional Mobility:  Bed Mobility:     Supine to Sit: (received up in chair)        Transfers:  Sit to Stand: Contact guard assistance  Stand to Sit: Contact guard assistance  Stand Pivot Transfers: Contact guard assistance                    Balance:   Sitting: Intact  Standing: Impaired  Standing - Static: Good  Standing - Dynamic : Fair  Ambulation/Gait Training:  Distance (ft): 60 Feet (ft)(x 3)  Assistive Device: Gait belt;Brace/Splint  Ambulation - Level of Assistance: Minimal assistance; Moderate assistance        Gait Abnormalities: Antalgic; Ataxic; Altered arm swing;Decreased step clearance; Path deviations; Shuffling gait;Trunk sway increased        Base of Support: Narrowed; Center of gravity altered(anterior COG)     Speed/Vivian: Shuffled;Fluctuations  Step Length: Left shortened;Right shortened                     Stairs: Therapeutic Exercises:   AROM:  shoulder flexion, abduction, forward and backward circles for pain relief    Functional Measure:  Barthel Index:    Bathin  Bladder: 5  Bowels: 10  Groomin  Dressin  Feeding: 10  Mobility: 10  Stairs: 5  Toilet Use: 5  Transfer (Bed to Chair and Back): 10  Total: 65/100       Percentage of impairment   0%   1-19%   20-39%   40-59%   60-79%   80-99%   100%   Barthel Score 0-100 100 99-80 79-60 59-40 20-39 1-19   0     The Barthel ADL Index: Guidelines  1. The index should be used as a record of what a patient does, not as a record of what a patient could do. 2. The main aim is to establish degree of independence from any help, physical or verbal, however minor and for whatever reason. 3. The need for supervision renders the patient not independent. 4. A patient's performance should be established using the best available evidence. Asking the patient, friends/relatives and nurses are the usual sources, but direct observation and common sense are also important. However direct testing is not needed. 5. Usually the patient's performance over the preceding 24-48 hours is important, but occasionally longer periods will be relevant. 6. Middle categories imply that the patient supplies over 50 per cent of the effort. 7. Use of aids to be independent is allowed. Shanel Abrams., Barthel, D.W. (0161). Functional evaluation: the Barthel Index. [facility] (14)2. Matilde Ban eve [place], J.J.M.F, Cooper Gramajo., Yu Wright., [place], [facility] (1999). Measuring the change indisability after inpatient rehabilitation; comparison of the responsiveness of the Barthel Index and Functional [place] Measure. Journal of Neurology, Neurosurgery, and Psychiatry, 66(4), 645-649. TEE Hsu, CRICKET Castellanos, & Shameka Fermin MRodneyA. (2004.) Assessment of post-stroke quality of life in cost-effectiveness studies: The usefulness of the Barthel Index and the EuroQoL-5D. Quality of Life Research, 13, 427-43         Pain:  Pain Scale 1: Numeric (0 - 10)  Pain Intensity 1: 3  Pain Location 1: Neck  Pain Orientation 1: Posterior  Pain Description 1: Aching  Pain Intervention(s) 1: Medication (see MAR)  Activity Tolerance:   VSS  Please refer to the flowsheet for vital signs taken during this treatment. After treatment:   ?         Patient left in no apparent distress sitting up in chair  ? Patient left in no apparent distress in bed  ? Call bell left within reach  ? Nursing notified  ? Caregiver present  ? Bed alarm activated    COMMUNICATION/EDUCATION:   The patient?s plan of care was discussed with: Registered Nurse. ?         Fall prevention education was provided and the patient/caregiver indicated understanding. ? Patient/family have participated as able in goal setting and plan of care.   ? Patient/family agree to work toward stated goals and plan of care. ?         Patient understands intent and goals of therapy, but is neutral about his/her participation. ? Patient is unable to participate in goal setting and plan of care.     Thank you for this referral.  Aldair Pizarro, PT   Time Calculation: 32 mins

## 2019-03-27 NOTE — PROGRESS NOTES
Orthopedic Spine Progress Note  Post Op day: 1 Day Post-Op    2019 8:29 AM   Admit Date: 3/26/2019  Procedure: Procedure(s):  C5-C6 C6-C7 ANTERIOR CERVICAL DISECTOMY WITH FUSION    Subjective:     Gail Nieves appears well. Pain seems to be managed. Tolerating liquids. She complains of a sensation of shortness of breath for over 1 year. CTA 2018 unremarkable for pulmonary issues. + Cardiomegaly. CXR 2019 unremarkable. Afebrile/VSS. No N/V  Voiding  Drain in place    Pain Control:   Pain Assessment  Pain Scale 1: Numeric (0 - 10)  Pain Intensity 1: 3  Pain Onset 1: surgery  Pain Location 1: Neck  Pain Orientation 1: Other (comment)  Pain Description 1: Aching  Pain Intervention(s) 1: Medication (see MAR)    Objective:          Physical Exam:  General:  Alert and oriented. No acute distress. Heart:  Respirations unlabored. Abdomen:   Extremities: Soft, non-tender. No evidence of cyanosis. Pulses palpable in both upper and lower extremities. Neurologic:  Musculoskeletal:  No new motor deficits. Neurovascular exam within normal limits. Sensation stable. Motor: unchanged C5-T1 and L2-S1. Ez's sign negative in bilateral lower extremities. Calves soft, nontender upon palpation and with passive twitch. Moves both upper and lower extremities. Incision: clean, dry, and intact. No significant erythema or swelling. No active drainage noted. Vital Signs:    Blood pressure 129/76, pulse 70, temperature 98.2 °F (36.8 °C), resp. rate 16, height 5' 4\" (1.626 m), weight 86.2 kg (190 lb), SpO2 95 %. Temp (24hrs), Av.9 °F (36.6 °C), Min:97.3 °F (36.3 °C), Max:98.8 °F (37.1 °C)      LAB:    No results for input(s): HCT, HGB, PLT, HCTEXT, HGBEXT, PLTEXT in the last 72 hours.   Lab Results   Component Value Date/Time    Sodium 141 2019 09:55 AM    Potassium 4.1 2019 09:55 AM    Chloride 104 2019 09:55 AM    CO2 30 2019 09:55 AM    Glucose 228 (H) 2019 09:55 AM    BUN 21 (H) 03/20/2019 09:55 AM    Creatinine 0.88 03/20/2019 09:55 AM    Calcium 9.5 03/20/2019 09:55 AM       Intake/Output:No intake/output data recorded. 03/25 1901 - 03/27 0700  In: 1700 [I.V.:1700]  Out: 2490 [Urine:2300; Drains:90]      Assessment:   Patient is 1 Day Post-Op s/p Procedure(s):  C5-C6 C6-C7 ANTERIOR CERVICAL DISECTOMY WITH FUSION    Plan:     1. PT - monitor c/o SOB with exertion  2. Continue established methods of pain control  3. VTE Prophylaxes - TEDS & SCDs   4. Encouraged use of ICS  5. Remove drain once output is <80  6.   Discharge possibly today pending PT clearance and pain control    Signed By: Michael Schafer PA-C

## 2019-03-27 NOTE — PROGRESS NOTES
Problem: Mobility Impaired (Adult and Pediatric)  Goal: *Acute Goals and Plan of Care (Insert Text)  Description  Physical Therapy Goals  Initiated 3/27/2019    1. Patient will move from supine to sit and sit to supine , scoot up and down and roll side to side in bed with modified independence within 4 days. 2. Patient will perform sit to stand with modified independence within 4 days. 3. Patient will ambulate with modified independence for 150 feet with the least restrictive device within 4 days. 4. Patient will ascend/descend 13 stairs with 1 handrail(s) with modified independence within 4 days. 5. Patient will verbalize and demonstrate understanding of spinal precautions (No bending, lifting greater than 5 lbs, or twisting; log-roll technique; frequent repositioning as instructed) within 4 days. 3/27/2019 1319 by Tommie Carr PT  Outcome: Progressing Towards Goal     PHYSICAL THERAPY TREATMENT  Patient: Gail Nieves (46 y.o. female)  Date: 3/27/2019  Precautions: Fall(cervical, brace)  Chart, physical therapy assessment, plan of care and goals were reviewed. ASSESSMENT:  Patient received up in chair, eager to participate. Reports shoulder pain improved with AROM from AM session. Overall CGA for transfers. Trialed gait with SBQC this session, no significant improvements in gait noted with AD. Continues to have significant ataxia, anterior COG, and path deviations with gait. Requires cues for slowing gait down for safety and shoulder retraction for posture. Overall min/mod A for gait secondary to impaired balance with multiple instances of overt LOB. Patient returned to supine with CGA using log roll technique. Continues to await OT consult. Patient continues to be appropriate for IP rehab at discharge to maximize functional gains and reduce fall risk. Progression toward goals:  ?      Improving appropriately and progressing toward goals  ?       Improving slowly and progressing toward goals  ? Not making progress toward goals and plan of care will be adjusted     PLAN:  Patient continues to benefit from skilled intervention to address the above impairments. Continue treatment per established plan of care. Discharge Recommendations:  Inpatient Rehab  Further Equipment Recommendations for Discharge: Owns SBQC and RW      SUBJECTIVE:   Patient stated ? I thought I was walking slow. ?   The patient stated 3/3 back precautions. Reviewed all 3 with patient. OBJECTIVE DATA SUMMARY:   Functional Mobility Training:  Bed Mobility:  Log    Supine to Sit: (received up in chair)  Sit to Supine: Contact guard assistance; Additional time; Adaptive equipment           Brace donned prior to session  Transfers:  Sit to Stand: Contact guard assistance  Stand to Sit: Contact guard assistance  Stand Pivot Transfers: Contact guard assistance                          Ambulation/Gait Training:  Distance (ft): 60 Feet (ft)(x 3)  Assistive Device: Brace/Splint;Gait belt;Cane, quad  Ambulation - Level of Assistance: Minimal assistance; Moderate assistance        Gait Abnormalities: Antalgic; Ataxic; Altered arm swing;Decreased step clearance; Path deviations; Shuffling gait;Trunk sway increased        Base of Support: Narrowed; Center of gravity altered(anterior COG)     Speed/Vivian: Shuffled;Fluctuations  Step Length: Left shortened;Right shortened                    Stairs: Therapeutic Exercises:   Reviewed LE exercises:  ankle pumps, quad sets, glute sets, SLR   Pain:  Pain Scale 1: Numeric (0 - 10)  Pain Intensity 1: 3  Pain Location 1: Neck  Pain Orientation 1: Posterior  Pain Description 1: Aching  Pain Intervention(s) 1: Medication (see MAR)  Activity Tolerance:   VSS  Please refer to the flowsheet for vital signs taken during this treatment. After treatment:   ?  Patient left in no apparent distress sitting up in chair  ? Patient left in no apparent distress in bed  ?   Call bell left within reach  ?  Nursing notified  ? Caregiver present  ?   Bed alarm activated    COMMUNICATION/COLLABORATION:   The patient?s plan of care was discussed with: Registered Nurse    Chiquis Iraheta, PT   Time Calculation: 17 mins

## 2019-03-28 ENCOUNTER — APPOINTMENT (OUTPATIENT)
Dept: CT IMAGING | Age: 74
DRG: 472 | End: 2019-03-28
Payer: COMMERCIAL

## 2019-03-28 LAB
GLUCOSE BLD STRIP.AUTO-MCNC: 105 MG/DL (ref 65–100)
GLUCOSE BLD STRIP.AUTO-MCNC: 118 MG/DL (ref 65–100)
GLUCOSE BLD STRIP.AUTO-MCNC: 119 MG/DL (ref 65–100)
GLUCOSE BLD STRIP.AUTO-MCNC: 152 MG/DL (ref 65–100)
GLUCOSE BLD STRIP.AUTO-MCNC: 181 MG/DL (ref 65–100)
GLUCOSE BLD STRIP.AUTO-MCNC: 191 MG/DL (ref 65–100)
SERVICE CMNT-IMP: ABNORMAL

## 2019-03-28 PROCEDURE — 97116 GAIT TRAINING THERAPY: CPT

## 2019-03-28 PROCEDURE — 74011636637 HC RX REV CODE- 636/637: Performed by: NURSE PRACTITIONER

## 2019-03-28 PROCEDURE — 97165 OT EVAL LOW COMPLEX 30 MIN: CPT

## 2019-03-28 PROCEDURE — 74011250637 HC RX REV CODE- 250/637: Performed by: PHYSICIAN ASSISTANT

## 2019-03-28 PROCEDURE — 74011636637 HC RX REV CODE- 636/637: Performed by: PHYSICIAN ASSISTANT

## 2019-03-28 PROCEDURE — 65270000029 HC RM PRIVATE

## 2019-03-28 PROCEDURE — 74011250637 HC RX REV CODE- 250/637: Performed by: NURSE PRACTITIONER

## 2019-03-28 PROCEDURE — 82962 GLUCOSE BLOOD TEST: CPT

## 2019-03-28 PROCEDURE — 97535 SELF CARE MNGMENT TRAINING: CPT

## 2019-03-28 PROCEDURE — 71250 CT THORAX DX C-: CPT

## 2019-03-28 RX ORDER — LOPERAMIDE HYDROCHLORIDE 2 MG/1
2 CAPSULE ORAL
Status: DISCONTINUED | OUTPATIENT
Start: 2019-03-28 | End: 2019-04-02 | Stop reason: HOSPADM

## 2019-03-28 RX ADMIN — INSULIN LISPRO 1 UNITS: 100 INJECTION, SOLUTION INTRAVENOUS; SUBCUTANEOUS at 11:53

## 2019-03-28 RX ADMIN — Medication 10 ML: at 16:41

## 2019-03-28 RX ADMIN — ACETAMINOPHEN 1000 MG: 500 TABLET ORAL at 16:38

## 2019-03-28 RX ADMIN — BENZOCAINE AND MENTHOL 1 LOZENGE: 15; 3.6 LOZENGE ORAL at 16:38

## 2019-03-28 RX ADMIN — BENZOCAINE AND MENTHOL 1 LOZENGE: 15; 3.6 LOZENGE ORAL at 09:10

## 2019-03-28 RX ADMIN — LOPERAMIDE HYDROCHLORIDE 2 MG: 2 CAPSULE ORAL at 16:38

## 2019-03-28 RX ADMIN — Medication 10 ML: at 21:21

## 2019-03-28 RX ADMIN — HYDROCHLOROTHIAZIDE 25 MG: 25 TABLET ORAL at 09:00

## 2019-03-28 RX ADMIN — METFORMIN HYDROCHLORIDE 500 MG: 500 TABLET, EXTENDED RELEASE ORAL at 21:21

## 2019-03-28 RX ADMIN — OXYCODONE HYDROCHLORIDE 5 MG: 5 TABLET ORAL at 08:59

## 2019-03-28 RX ADMIN — BENZONATATE 200 MG: 100 CAPSULE ORAL at 08:59

## 2019-03-28 RX ADMIN — CARVEDILOL 3.12 MG: 3.12 TABLET, FILM COATED ORAL at 08:59

## 2019-03-28 RX ADMIN — LOPERAMIDE HYDROCHLORIDE 2 MG: 2 CAPSULE ORAL at 21:21

## 2019-03-28 RX ADMIN — BENZOCAINE AND MENTHOL 1 LOZENGE: 15; 3.6 LOZENGE ORAL at 21:25

## 2019-03-28 RX ADMIN — PRAVASTATIN SODIUM 80 MG: 40 TABLET ORAL at 21:21

## 2019-03-28 RX ADMIN — BENZOCAINE AND MENTHOL 1 LOZENGE: 15; 3.6 LOZENGE ORAL at 12:25

## 2019-03-28 RX ADMIN — ACETAMINOPHEN 1000 MG: 500 TABLET ORAL at 21:21

## 2019-03-28 RX ADMIN — ACETAMINOPHEN 1000 MG: 500 TABLET ORAL at 08:59

## 2019-03-28 RX ADMIN — Medication 10 ML: at 04:42

## 2019-03-28 RX ADMIN — BENZONATATE 200 MG: 100 CAPSULE ORAL at 16:38

## 2019-03-28 RX ADMIN — INSULIN GLARGINE 10 UNITS: 100 INJECTION, SOLUTION SUBCUTANEOUS at 09:11

## 2019-03-28 RX ADMIN — SERTRALINE HYDROCHLORIDE 100 MG: 50 TABLET ORAL at 08:59

## 2019-03-28 RX ADMIN — BUPROPION HYDROCHLORIDE 150 MG: 150 TABLET, EXTENDED RELEASE ORAL at 09:09

## 2019-03-28 RX ADMIN — FENOFIBRATE 145 MG: 145 TABLET ORAL at 08:59

## 2019-03-28 RX ADMIN — METFORMIN HYDROCHLORIDE 500 MG: 500 TABLET, EXTENDED RELEASE ORAL at 05:55

## 2019-03-28 RX ADMIN — OXAZEPAM 30 MG: 15 CAPSULE, GELATIN COATED ORAL at 21:21

## 2019-03-28 NOTE — PROGRESS NOTES
Orthopedic Spine Progress Note  Post Op day: 2 Days Post-Op    2019 11:38 AM   Admit Date: 3/26/2019  Procedure: Procedure(s):  C5-C6 C6-C7 ANTERIOR CERVICAL DISECTOMY WITH FUSION    Subjective:     Niko Colon doing well. No arm pain. No numbness, tingling of UEs. Tolerating diet. Strong voice. No N/V. Pain Control:   Pain Assessment  Pain Scale 1: Numeric (0 - 10)  Pain Intensity 1: 3  Pain Onset 1: post op  Pain Location 1: Neck  Pain Orientation 1: Anterior  Pain Description 1: Aching  Pain Intervention(s) 1: Medication (see MAR)    Objective:          Physical Exam:  General:  Alert and oriented. No acute distress. Heart:  Respirations unlabored. Abdomen:   Extremities: Soft, non-tender. No evidence of cyanosis. Pulses palpable in both upper and lower extremities. Neurologic:  Musculoskeletal:  No new motor deficits. Neurovascular exam within normal limits. Sensation stable. Motor: unchanged C5-T1 and L2-S1. Ez's sign negative in bilateral lower extremities. Calves soft, nontender upon palpation and with passive twitch. Moves both upper and lower extremities. Incision: clean, dry, and intact. No significant erythema or swelling. No active drainage noted. Vital Signs:    Blood pressure 146/77, pulse 71, temperature 98.6 °F (37 °C), resp. rate 16, height 5' 4\" (1.626 m), weight 86.2 kg (190 lb), SpO2 98 %. Temp (24hrs), Av.4 °F (36.9 °C), Min:97.6 °F (36.4 °C), Max:98.9 °F (37.2 °C)    EXAM:  CT CHEST WO CONT     INDICATION:  Shortness of breath produced by exertion or stress.     COMPARISON: Chest radiographs 2019. CT chest 2018.     TECHNIQUE: Helical CT of the chest is performed without intravenous contrast.  Coronal and sagittal reformatted images are obtained. CT dose reduction was  achieved through use of a standardized protocol tailored for this examination  and automatic exposure control for dose modulation.  Adaptive statistical  iterative reconstruction (ASIR) was utilized.     FINDINGS:      Surgical changes are seen following bilateral mastectomy with right breast  reconstruction and a left breast prosthesis.     The aorta and main pulmonary artery are normal in caliber. There is aortic arch  and coronary artery atherosclerosis. The heart size is normal.  There is no  pericardial effusion.       There are no enlarged axillary, mediastinal, or hilar lymph nodes. Calcified  right hilar lymph nodes are again noted.     Calcified granulomas in the right lower lobe are noted. Several bilateral  subcentimeter nodules are better seen due to motion on the prior exam and  include a 4 mm nodule in the right middle lobe (series 4, image 33) and a 3 mm  nodule in the left upper lobe (series 4, image 24). There are mild diffuse  subpleural reticular opacities without honeycombing or bronchiectasis. There is  no pleural effusion or pneumothorax. The central airways are clear.     In the limited images of the upper abdomen, the partially imaged solid organs  are unremarkable. The bones are stable. Posterior thoracolumbar fusion hardware  is partially visualized.     IMPRESSION  IMPRESSION:   1. Mild diffuse subpleural reticular opacities suggesting mild interstitial lung  disease. No honeycombing or bronchiectasis.     2. Bilateral lung nodules measuring up to 4 mm. Guidelines by the Fleischner  society (Radiology 2017 special report) suggest that patients with low risk for  lung cancer and solid nodule(s) less than or equal to 6 mm in diameter require  no follow-up. In patients with a higher risk, such as smokers, follow-up  noncontrast chest CT should be considered at 12 months. Patients with a known  malignancy are at increased risk for metastasis and should receive a three month  follow-up.     LAB:    Recent Labs     03/27/19  1234   HCT 40.5   HGB 13.1        Lab Results   Component Value Date/Time    Sodium 140 03/27/2019 12:34 PM    Potassium 4.0 03/27/2019 12:34 PM    Chloride 106 03/27/2019 12:34 PM    CO2 26 03/27/2019 12:34 PM    Glucose 261 (H) 03/27/2019 12:34 PM    BUN 12 03/27/2019 12:34 PM    Creatinine 0.92 03/27/2019 12:34 PM    Calcium 9.1 03/27/2019 12:34 PM       Intake/Output:03/28 0701 - 03/28 1900  In: -   Out: 800 [Urine:800]  03/26 1901 - 03/28 0700  In: -   Out: 5419 [Urine:2700; Drains:70]    PT/OT:   Gait:  Gait  Base of Support: Center of gravity altered, Narrowed  Speed/Vivian: Slow, Pace decreased (<100 feet/min), Delayed  Step Length: Right shortened, Left shortened  Gait Abnormalities: Antalgic, Decreased step clearance, Path deviations, Step to gait, Trunk sway increased(pt with increased LLE weakness and L ankle inversion with in)  Ambulation - Level of Assistance: Minimal assistance, Moderate assistance, Additional time  Distance (ft): 60 Feet (ft)(x3; multiple rests for fatigue and balance/safety deficits)  Assistive Device: Gait belt, Brace/Splint, Cane, quad(L HHA and R quad cane)                 Assessment:   Patient is 2 Days Post-Op s/p Procedure(s):  C5-C6 C6-C7 ANTERIOR CERVICAL DISECTOMY WITH FUSION    Plan:     1. Continue PT/OT  2. Continue established methods of pain control  3. VTE Prophylaxes - TEDS &/or SCDs   4. Encouraged ICS  5. Chest CT reveals no acute processes. Needs outpt pulmonology f/u  6.   Discharge to rehab pending be availability      Signed By: MARILYN Quiroz

## 2019-03-28 NOTE — DISCHARGE INSTRUCTIONS
After Hospital Care Plan:  Discharge Instructions Cervical (Neck) Spine Surgery Dr. Marva Montesinos    Patient Name: Mela Sandoval    Date of procedure: 3/26/2019  Date of discharge:     Procedure: Procedure(s):  C5-C6 C6-C7 ANTERIOR CERVICAL DISECTOMY WITH FUSION  PCP: Ana Quinteros MD       Follow-up with your pulmonologist regarding your chronic shortness of breath. Your CT of the chest during this hospitalization revealed findings that were suggestive of mild interstitial lung disease. You were found to have a 4 mm nodule in the right middle lobe and a 3 mm nodule in the left upper lobe. Follow up appointments   -follow up with Dr. Marva Montesinos in 2 weeks. Call [phone] to make an appointment as soon as you get home from the hospital.    When to call your Orthopaedic Surgeon:  -Difficulty swallowing that is worse than when you left the hospital.  -Signs of infection-if your incision is red; continues to have drainage; drainage has a foul odor or if you have a persistent fever over 101 degrees for 24 hours  -nausea or vomiting, severe headache  -changes in sensation in your arms or legs (numbness, tingling, loss of color)  -increased weakness-greater than before your surgery  -severe pain or pain not relieved by medications  -Signs of a blood clot in your leg-calf pain, tenderness, redness, swelling of lower leg    When to call your Primary Care Physician:  -Concerns about medical conditions such as diabetes, high blood pressure, asthma, congestive heart failure  -Call if blood sugars are elevated, persistent headache or dizziness, coughing or congestion, constipation or diarrhea, burning with urination, abnormal heart rate    When to call 911 and go to the nearest emergency room:  -acute onset of chest pain, shortness of breath, difficulty breathing    Activity  - You are going home a well person, be as active as possible. Your only exercise should be walking.   Start with short frequent walks and increase your walking distance each day.  -Limit the amount of time you sit to 20-30 minute intervals. Sitting for prolonged periods of time will be uncomfortable for you following surgery. - Do NOT lift anything over 5 pounds  -From now on, even when lifting light weight, bend with your knees and not your back.  -Do NOT do any neck exercises until you have been instructed by your doctor  -When you are in bed, you may lay on your back or on either side. Do NOT lie on your stomach    Cervical Collar (Aspen Collar)  -You are required to wear your cervical collar at all times; except when showering. You may remove the collar long enough to change the pads when needed and to change your dressing each day. -Do not bend or twist when your collar is off. It is best to have someone assist you when changing the pads and your dressing to prevent you from bending your neck. - Clean the pads on your neck collar every day by hand washing with a mild soap and water. Pat them dry with a towel and lay out to air dry. Do not use heat to dry the pads. Driving  -You may not drive or return to work until instructed by your physician. However, you may ride in the car for short periods of time. Incision Care  Your incision has been closed with absorbable sutures and the Zipline skin closure system. This will assist with healing. The Orbie Comal is to remain on your incision for 2 weeks. A dry dressing (ABD and tape) will be placed over it and should be changed daily, for at least the first several days after your surgery. If you have no incisional drainage, you may leave the incision open to air if you wish, still leaving the Zipline in place. Please make sure to wash your hands prior to touching your dressing. You may take brief showers but do not run the water directly onto the wound. After your shower, blot your incision dry with a soft towel and replace the dry dressing.  Do not allow the tape to come in contact with the Zipline. Do not rub or apply any lotions or ointments to your incision site. Do not soak or scrub your wound. The Zipline dressing will be removed during your two week follow-up appointment. If you experience drainage leaking from underneath the Zipline or if it peels off before 2 weeks, please contact your orthopedic surgeons office. Showering  -You may shower in approximately 2 days after your surgery.    -Leave the dressing on during your shower. Do NOT allow the water to run directly onto your dressing. Once you get out of the shower, put on a dry dressing.  -Reminder- Make sure you put clean pads on your collar after your shower.    -Do not take a tub bath. Preventing blood clots  -You have been given T.E.D. stockings to wear. Continue to wear these for 7 days after your discharge. Put them on in the morning and take them off at night.    -They are used to increase your circulation and prevent blood clots from forming in your legs  -T. E.D. stockings can be machine washed, temperature not to exceed 160° F (71°C) and machine dried for 15 to 20 minutes, temperature not to exceed 250° F (121°C). Pain management  -Take pain medication as prescribed; decrease the amount you use as your pain lessens  -Do not wait until you are in extreme pain to take your medication.  -Avoid alcoholic beverages while taking pain medication    Pain Medication Safety  DO:  -Read the Medication Guide   -Take your medicine exactly as prescribed   -Store your medicine away from children and in a safe place   -Flush unused medicine down the toilet   -Call your healthcare provider for medical advice about side effects. You may report side effects to FDA at 9-175-FDA-3239.   -Please be aware that many medications contain Tylenol. We do not want you to over medicate so please read the information below as a guide. Do not take more than 4 Grams of Tylenol in a 24 hour period.   (There are 1000 milligrams in one Gram) Percocet contains 325 mg of Tylenol per tablet (do not take more than 12 tablets in 24 hours)  Lortab contains 500 mg of Tylenol per tablet (do not take more than 8 tablets in 24 hours)  Norco contains 325 mg of Tylenol per tablet (do not take more than 12 tablets in 24 hours). DO NOT:  -Do not give your medicine to others   -Do not take medicine unless it was prescribed for you   -Do not stop taking your medicine without talking to your healthcare provider   -Do not break, chew, crush, dissolve, or inject your medicine. If you cannot  swallow your medicine whole, talk to your healthcare provider.  -Do not drink alcohol while taking this medicine  -Do not take anti-inflammatory medications or aspirin unless instructed by your     Physician. Diet  -resume usual diet; drink plenty of fluids; eat foods high in fiber  -It is important to have regular bowel movements. Pain medications may cause constipation. You may want to take a stool softener (such as Senokot-S or Colace) to prevent constipation. If constipation occurs, take a laxative (such as Dulcolax tablets, Milk of Magnesia, or a suppository). Laxatives should only be used if the above preventable measures have failed and you still have not had a bowel movement after three days.

## 2019-03-28 NOTE — PROGRESS NOTES
Orthopedic Spine Progress Note  Hallie Corcoran, AGACNP-BC  Work Cell: [phone]    Post Op Day: 2 Day Post-Op    March 28, 2019 1:10 PM     Raul Tao    HPI:      Raul Tao is a 69 y.o. female with prior medical history notable for HLD, AVN of hip, anxiety, CKD II, DMII, obesity, PMR, IBS, depression, rosacea, dyspnea, and cervical stenosis. She has history of a prior L2-S1 lumbar fusion in 2008 for lumbar stenosis and scoliosis. She later established with Dr. Ana M Liriano and underwent a L1-2 laminectomy with nba osteotomy/extension of fusion to T10 in July 2018 due to progressive lumbar neurogenic claudication. This surgery was complicated by a incidental durotomy primarily repaired intraoperatively. She did well for a few months but unfortunately developed an unsteady gait and was falling. She had some episodes of urinary incontinence in February. She was losing some dexterity in her hands. MRI of the cervical spine demonstrates severe spondylosis at C5-6 and C6-7; mild spondylosis at C4-5. Severe stenosis C5-6 and C6-7 with foraminal stenosis and cord compression. After a discussion of the risks, benefits, and alternatives, Raul Tao consented to undergo a Procedure(s):  C5-C6 C6-C7 ANTERIOR CERVICAL DISECTOMY WITH FUSION    Subjective     Ms. Riya Mejia is doing well today. She has no vocal hoarseness or dysphagia. Sensorimotor function stable. She is progressing slowly with PT- mod assist for functional mobility. Rehab recommended     She denies h/a, dizziness, blurred vision, cp, palpitations, fever, chills, or n/v.               Objective:     Physical Exam:  General:  Alert and oriented. No acute distress. Respiratory:  Breathing unlabored. No wheezing or rhonchi. Dry cough   Abdomen:   Extremities: Soft, non-tender, non-distended  No evidence of swelling. Pedal pulses palpable. Neurologic:      Musculoskeletal:  Speech fluent. Face symmetric. No new motor deficits. Obregon/fc/silt.  Motor: unchanged C5-T1 and L2-S1. Negative gonzalez's. No tremor. Negative clonus. Calves soft, nontender upon palpation and with passive stretch. .  Moves both upper and lower extremities. Incision: clean, dry, and intact. No significant erythema or swelling. No active drainage noted. Vital Signs:    Patient Vitals for the past 8 hrs:   BP Temp Pulse Resp SpO2   19 1436 130/74 98.9 °F (37.2 °C) 69 16 96 %   19 1101 146/77 98.6 °F (37 °C) 71 16 98 %     Temp (24hrs), Av.5 °F (36.9 °C), Min:97.6 °F (36.4 °C), Max:98.9 °F (37.2 °C)      Intake/Output:   07 - 1900  In: -   Out: 800 [Urine:800]   190 -  0700  In: -   Out: 9239 [Urine:2700; Drains:70]    Pain Control:   Pain Assessment  Pain Scale 1: Numeric (0 - 10)  Pain Intensity 1: 0  Pain Onset 1: post op  Pain Location 1: Neck  Pain Orientation 1: Anterior  Pain Description 1: Aching  Pain Intervention(s) 1: Medication (see MAR)    LAB:    Recent Labs     19  1234   HCT 40.5   HGB 13.1     Lab Results   Component Value Date/Time    Sodium 140 2019 12:34 PM    Potassium 4.0 2019 12:34 PM    Chloride 106 2019 12:34 PM    CO2 26 2019 12:34 PM    Glucose 261 (H) 2019 12:34 PM    BUN 12 2019 12:34 PM    Creatinine 0.92 2019 12:34 PM    Calcium 9.1 2019 12:34 PM       PT/OT:   Gait:  Gait  Base of Support: Center of gravity altered  Speed/Vivian: Slow, Pace decreased (<100 feet/min)  Step Length: Right shortened, Left shortened  Gait Abnormalities: Antalgic, Decreased step clearance, Path deviations, Step to gait, Trunk sway increased  Ambulation - Level of Assistance: Minimal assistance, Additional time  Distance (ft): 50 Feet (ft)(x2; decreased distanced ambulatd from AM session)  Assistive Device: Gait belt, Cane, quad(L HHA and R quad cane use)                   Assessment/Plan      1.  Cervical myelopathy, 2 Day Post-Op Procedure(s):  C5-C6 C6-C7 ANTERIOR CERVICAL DISECTOMY WITH FUSION   -Continue PT/OT   -Pain control- APAP, PRN oxycodone,   -decadron 10 mg x4 doses postop   -episodes of incontinence (not new)   -Incentive Spirometer   -Tolerating diet. Continue bowel regimen   -VTE Prophylaxes - TEDS &SCDs    2. Chronic dyspnea   -Patient tells me she has a year of sob but this has worsened  since December.    -patient states her recent CXR, EKG ordered by PCP outpatient was unremarkable.    -no recent echo    -sats stable on room air   -CT chest obtained due to increased dyspnea with PT yesterday. This revealed changes suggesting mild interstitial lung disease. She has bilateral lung nodules <4mm. Fleischner guidelines recommend no need for follow-up with low risk patients with nodules this size. In setting of her hx of malignancy (breast cancer) she will likely need follow-up imaging. F/u with [facility] where she has been established in the past.    3. Cough   -may be bronchospastic   -start duonebs   -tessalon PRN    4. DMII   -hgba1c 7.3%   -fbg between 118-181 in last 24 hours   -home metformin restarted   -continue accuchecks, diabetic diet, humalog SSI   -started lantus 10 units as patient started on steroids postop   -educated regarding tight glycemic control postop    5. Hx of ITP   -followed o/p by Dr. Yfn Kc   -plts 298 postop    6. Anxiety and depression   -continue Serax, Zoloft, and Wellbutrin from home     7. Hypertension   -bp stable    8. Joint pain, malaise   -has hx of PMR. needs rheumatology f/u outpatient    Plan above discussed with Dr. Peter Bradshaw    Discharge To:  Rehab recommended by PT.  Referral sent to [facility]    Signed By: Emmy Bae NP

## 2019-03-28 NOTE — PROGRESS NOTES
Problem: Mobility Impaired (Adult and Pediatric)  Goal: *Acute Goals and Plan of Care (Insert Text)  Description  Physical Therapy Goals  Initiated 3/27/2019    1. Patient will move from supine to sit and sit to supine , scoot up and down and roll side to side in bed with modified independence within 4 days. 2. Patient will perform sit to stand with modified independence within 4 days. 3. Patient will ambulate with modified independence for 150 feet with the least restrictive device within 4 days. 4. Patient will ascend/descend 13 stairs with 1 handrail(s) with modified independence within 4 days. 5. Patient will verbalize and demonstrate understanding of spinal precautions (No bending, lifting greater than 5 lbs, or twisting; log-roll technique; frequent repositioning as instructed) within 4 days. Outcome: Not Progressing Towards Goal   PHYSICAL THERAPY TREATMENT  Patient: Sheree Caraballo (38 y.o. female)  Date: 3/28/2019  Diagnosis: Cervical stenosis of spine [M48.02] <principal problem not specified>  Procedure(s) (LRB):  C5-C6 C6-C7 ANTERIOR CERVICAL DISECTOMY WITH FUSION (N/A) 2 Days Post-Op  Precautions: Fall(cervical, brace)  Chart, physical therapy assessment, plan of care and goals were reviewed. ASSESSMENT:  Pt resting in bed upon arrival and agreeable to POC: bed mobility, transfers, gait, education, and OOB to chair for 30' goal.  Pt currently requires upwards of modA for functional mobility this date. Pt p/w LLE weakness and fatigue resulting in significant balance and tolerance deficits during ambulation with BUE support. Pt also requires multiple standing rest breaks for above impairments with education for safety and recovery. Pt remains appropriate for D/C to inpt rehab once medically stable for optimal recovery and she will tolerate 3 hrs of therapy per day with appropriate rest breaks and spacing.   Of note: pt lives at home alone and will not be able to safely care for herself in home setting. Progression toward goals:  ?      Improving appropriately and progressing toward goals  ? Improving slowly and progressing toward goals  ? Not making progress toward goals and plan of care will be adjusted     PLAN:  Patient continues to benefit from skilled intervention to address the above impairments. Continue treatment per established plan of care. Discharge Recommendations:  Rehab and Inpatient Rehab  Further Equipment Recommendations for Discharge:  NA if going to a facility      SUBJECTIVE:   Patient stated ? How do you relieve the tension in the shoulders? ?  Pt educated on shoulder retraction, elevation and depression with relaxation, and postural improvements with sitting and standing. The patient stated 2/3 back precautions. Reviewed all 3 with patient. OBJECTIVE DATA SUMMARY:   Critical Behavior:  Neurologic State: Alert  Orientation Level: Oriented X4        Functional Mobility Training:  Bed Mobility:  Log Rolling: Contact guard assistance; Additional time(HOB lowered and rail used on L)  Supine to Sit: Minimum assistance; Additional time  Sit to Supine: (NT; OOB to chair)  Scooting: Contact guard assistance; Additional time    Transfers:  Sit to Stand: Contact guard assistance  Stand to Sit: Minimum assistance; Additional time(cues for alignment, reach back, controlled descent)    Balance:  Sitting: Intact  Standing: Impaired; With support  Standing - Static: Fair  Standing - Dynamic : Fair  Ambulation/Gait Training:  Distance (ft): 60 Feet (ft)(x3; multiple rests for fatigue and balance/safety deficits)  Assistive Device: Gait belt;Brace/Splint;Cane, quad(L HHA and R quad cane)  Ambulation - Level of Assistance: Minimal assistance; Moderate assistance; Additional time    Gait Abnormalities: Antalgic;Decreased step clearance; Path deviations; Step to gait;Trunk sway increased(pt with increased LLE weakness and L ankle inversion with in)  Base of Support: Center of gravity altered;Narrowed  Speed/Vivian: Slow;Pace decreased (<100 feet/min); Delayed  Step Length: Right shortened;Left shortened  Therapeutic Exercises:   HR/TR   Pain:  Pain Scale 1: Numeric (0 - 10)  Pain Intensity 1: 4  Pain Location 1: Neck  Pain Orientation 1: Anterior  Pain Description 1: Aching  Pain Intervention(s) 1: Medication (see MAR)  Activity Tolerance:   Good with appropriate rest breaks/ SpO2 95% on RA  Please refer to the flowsheet for vital signs taken during this treatment. After treatment:   ?  Patient left in no apparent distress sitting up in chair  ? Patient left in no apparent distress in bed  ? Call bell left within reach  ? Nursing notified  ? Caregiver present  ?   Bed alarm activated    COMMUNICATION/COLLABORATION:   The patient?s plan of care was discussed with: Registered Nurse and     Roro Luis   Time Calculation: 20 mins

## 2019-03-28 NOTE — PROGRESS NOTES
Tomy spoke with [facility] liaison. She is going to submit the referral to her physician and let me know. Will follow.   Sen SAN, ACM

## 2019-03-28 NOTE — PROGRESS NOTES
Problem: Self Care Deficits Care Plan (Adult)  Goal: *Acute Goals and Plan of Care (Insert Text)  Description  Occupational Therapy Goals  Initiated 3/28/2019    1. Patient will perform lower body dressing with minimal assistance/contact guard assist using AE PRN within 4 days. 2.  Patient will perform toileting with minimal assistance/contact guard assist using most appropriate DME within 4 days. 3.  Patient will perform standing ADLs 5 mins at minimal assistance/contact guard assist within 4 days. 4.  Patient will don/doff cervical brace at minimal assistance/contact guard assist within 4 days. 5.  Patient will verbalize/demonstrate 3/3 cervical precautions during ADL tasks without cues within 4 days. Outcome: Progressing Towards Goal  OCCUPATIONAL THERAPY EVALUATION: Cervical spine  Patient: Hunter Mg (13 y.o. female)  Date: 3/28/2019  Primary Diagnosis: Cervical stenosis of spine [M48.02]  Procedure(s) (LRB):  C5-C6 C6-C7 ANTERIOR CERVICAL DISECTOMY WITH FUSION (N/A) 2 Days Post-Op   Precautions:   Back, Fall(cervical brace)    ASSESSMENT :  Based on the objective data described below, the patient presents with Modified independent PLOF. Today presents with Moderate Assistance upper body ADLs, Maximum assistance lower body ADLs, and Moderate Assistance assist functional mobility. The following are barriers to ADL independence while in acute care:     - Medical condition: B UEs R > L and L > R limited ROM, B UEs and LEs strength, functional reach, functional endurance, standing balance, 1/3 cervical precautions demonstrated, girth and medical history    - Other:   R RTC tear, SOB with activity, weakness progressed since Dec. 2018, urinary and bowel incontinence (R hand dominant)    Patient will benefit from skilled acute intervention to address the above impairments.   Patient?s rehabilitation potential is considered to be Good    Discharge recommendations: Rehab at inpatient facility: patient can tolerate 3 hours of therapy  If above is not an option then recommend: Rehab at skilled nursing facility (SNF)    Barriers to discharging home, in addition to above listed impairments: lives alone  family availability to assist  total assist driving to follow up medical appointment(s)/groceries/obtain medication  home environment unsafe due to bedroom is on 2nd floor   level of physical assist required to maintain patient safety. Equipment recommendations for successful discharge (if) home: bedside commode, gait belt and transfer bench     PLAN :  Recommendations and Planned Interventions: self care training, functional mobility training, therapeutic exercise, balance training, therapeutic activities, endurance activities, patient education, home safety training and family training/education    Frequency/Duration: Patient will be followed by occupational therapy 5 times a week to address goals. SUBJECTIVE:   Patient stated ? I want to be able to take care of myself, so tell me everything I need to know. ?    OBJECTIVE DATA SUMMARY:   HISTORY:   Past Medical History:   Diagnosis Date    Abnormal LFTs 08/24/2017    FATTY LIVER    Arthritis     OSTEO    Avascular necrosis of hip ([facility] [address] 08/24/2017    BILAT HIPS    Breast CA ([facility] [address] 1989, 2001    BILATERAL; SURGERY, CHEMO    Chronic pain     CKD (chronic kidney disease), stage II 8/24/2017    Coagulation disorder ([facility] [address] 1984    ITP  (DR CHU BRADSHAW) - PLATELETS DROPPED TO 5K    Depression     Diabetes ([facility] [address]     TYPE 2; NIDDM    DJD (degenerative joint disease), multiple sites 8/24/2017    GI bleed 2008    HEMORRHOIDS    Hyperlipemia     Hypertension with renal disease 8/24/2017    IBS (irritable bowel syndrome) 8/24/2017    Ill-defined condition 2015    PNEUMONIA X2 - HOSPITALIZED IN 2015    Liver disease     FATTY LIVER    Myalgia 8/24/2017    On statin therapy 8/24/2017    Overactive bladder 8/24/2017    Pneumonia 04/2015    HOSPITALIZED 3 WEEKS.     Polymyalgia rheumatica ([facility] [address] 8/24/2017    Prophylactic antibiotic 8/24/2017    Rosacea      Past Surgical History:   Procedure Laterality Date    BREAST SURGERY PROCEDURE UNLISTED  1993, 2002    RECONSTRUCTION X2    HX APPENDECTOMY  1950    HX BREAST BIOPSY Bilateral     HX CATARACT REMOVAL Bilateral     HX COLONOSCOPY      HX ENDOSCOPY      HX GI      COLONOSCOPY    HX HEENT      WISDOM TEETH    HX HYSTERECTOMY  2000S    HX MASTECTOMY Right 1989    HX MASTECTOMY Left 2001    HX ORTHOPAEDIC  2008    LUMBAR FUSION    HX ORTHOPAEDIC  2018    RE-DO LUMBAR FUSION, AND ADDED THORACIC FUSION    HX TUBAL LIGATION  1981    HX UROLOGICAL  2000s    BLADDER SLING    TOTAL HIP ARTHROPLASTY Left 11/16/2016    ANTERIOR APPROACH, DR Gwendolyn De La Torre; (POSTOP: STANDS ONE INCH TALLER ON LEFT FOOT)    TOTAL HIP ARTHROPLASTY Right 12/2016    ANTERIOR APPROACH (DR Gwendolyn De La Torre)       Prior Level of Function/Environment/Context: independent to modified independence with basic ADLs, modified independence with instrumental ADLs. Since December 2018 patient with progressive weakness B UEs, LEs, standing tolerance, urinary and bowel incontinence requiring use of pads and change of clothes every time gets to the bathroom. Stating bed height raised 2* has to continually change linens due to being soiled. Poor standing tolerance for housekeeping and meal prep. Working full time in IT as able. Has someone now to do yard and  once a month. Uses quad cane against bed to push self up from bed. Unable to wipe bottom at work so has to wait until she gets home to lie down and wipe with L UE.    Occupations in which the patient is/was successful, what are the barriers preventing that success:   Performance Patterns (routines, roles, habits, and rituals):   Personal Interests and/or values:   Expanded or extensive additional review of patient history:     Home Situation  Home Environment: Private residence  # Steps to Enter: 5  Rails to Enter: Yes  Wheelchair Ramp: Yes  One/Two Story Residence: Two story  # of Interior Steps: [id number] [facility] Floor: Both  Lift Chair Available: No  Living Alone: Yes  Support Systems: None  Patient Expects to be Discharged to[de-identified] Rehabilitation facility  Current DME Used/Available at Home: Cane, quad, Walker, rolling, Wheelchair - has bed rail but unable to have anyone to attach it yet  Tub or Shower Type: Tub/Shower combination    Hand dominance: Right    EXAMINATION OF PERFORMANCE DEFICITS:  Cognitive/Behavioral Status:  Neurologic State: Alert  Orientation Level: Oriented X4  Cognition: Appropriate decision making; Appropriate for age attention/concentration; Appropriate safety awareness  Perception: Appears intact  Perseveration: No perseveration noted  Safety/Judgement: Awareness of environment;Good awareness of safety precautions    Skin: intact bandages    Edema: intact    Hearing:       Vision/Perceptual:                           Acuity: Impaired near vision(baseline)    Corrective Lenses: Reading glasses    Range of Motion:  B shoulder flexion 90*, R IR to neutral/hip  AROM: Generally decreased, functional                         Strength:  -3/5 shoulders duringADLs, not formally tested 2* precautions  Strength: Generally decreased, functional                Coordination:     Fine Motor Skills-Upper: Left Intact; Right Intact    Gross Motor Skills-Upper: Left Intact; Right Intact    Tone & Sensation:  intact                            Balance:  Sitting: Intact  Standing: Impaired; With support  Standing - Static: Fair  Standing - Dynamic : Fair    Functional Mobility and Transfers for ADLs:  Bed Mobility:  Rolling: Contact guard assistance; Additional time: bed flat, exit R side as at home, bed rail use (states has one but does not have anyone   Supine to Sit: (NT)  Sit to Supine: Minimum assistance; Additional time(cues to maintain L SL prior to roll supine)  Scooting: (NT)    Transfers:  Sit to Stand: Contact guard assistance(pt standing prior to instructions-pt slightly impulsive this)  Stand to Sit: Minimum assistance; Additional time(cues for alignment, reach back, controlled descent)  Bathroom Mobility: Moderate assistance  Toilet Transfer : Moderate assistance  Tub Transfer: Total assistance    ADL Assessment:  Feeding: Independent    Oral Facial Hygiene/Grooming: Supervision standing at sink 2 mins, setup on tray otherwise     Bathing: Moderate assistance infer A ankles-feet, standing balance, and posterior body head-toes    Upper Body Dressing: Moderate assistance total A cervical collar, posterior head to comb hair    Lower Body Dressing: Maximum assistance states dress R LE first after back surgery however, now L LE is decreased. Able to tailor sit with B UEs lifting LEs and still unable to reach toes. Instruction on 5lb resistance limitations. Toileting: Moderate assistance R handed but unable                ADL Intervention and task modifications: Instruction on benefits of donning L LE first.     Instruction on benefits of toilet tongs, Bottom Toan or bidet. Cognitive Retraining  Safety/Judgement: Awareness of environment;Good awareness of safety precautions    Patient instructed and demonstrated 1/3 cervical spine precautions with min cues not to laterally rotate       Activity Tolerance:   Fair, requires frequent rest breaks and observed SOB with actity  Please refer to the flowsheet for vital signs taken during this treatment. After treatment patient left:   Supine in bed  Bed in low position  Call light within reach  Side rails x 2   COMMUNICATION/EDUCATION:   The patient?s plan of care was discussed with: Physical Therapist, Occupational Therapist and . Home safety education was provided and the patient/caregiver indicated understanding., Patient/family have participated as able in goal setting and plan of care.  and Patient/family agree to work toward stated goals and plan of care.    This patient?s plan of care is appropriate for delegation to BEENA.     Thank you for this referral.  Jerad Patterson  Time Calculation: 31 mins

## 2019-03-28 NOTE — PROGRESS NOTES
Problem: Mobility Impaired (Adult and Pediatric)  Goal: *Acute Goals and Plan of Care (Insert Text)  Description  Physical Therapy Goals  Initiated 3/27/2019    1. Patient will move from supine to sit and sit to supine , scoot up and down and roll side to side in bed with modified independence within 4 days. 2. Patient will perform sit to stand with modified independence within 4 days. 3. Patient will ambulate with modified independence for 150 feet with the least restrictive device within 4 days. 4. Patient will ascend/descend 13 stairs with 1 handrail(s) with modified independence within 4 days. 5. Patient will verbalize and demonstrate understanding of spinal precautions (No bending, lifting greater than 5 lbs, or twisting; log-roll technique; frequent repositioning as instructed) within 4 days. 3/28/2019 1310 by John MANZANO  Outcome: Progressing Towards Goal   PHYSICAL THERAPY TREATMENT: Spine  Patient: Dale Jennings (34 y.o. female)  Date: 3/28/2019  Diagnosis: Cervical stenosis of spine [M48.02] <principal problem not specified>  Procedure(s) (LRB):  C5-C6 C6-C7 ANTERIOR CERVICAL DISECTOMY WITH FUSION (N/A) 2 Days Post-Op  Precautions: Fall(cervical, brace) No bending, no lifting greater than 5 lbs, no twisting, log-roll technique, repositioning every 20-30 min except when sleeping, brace when OOB    Chart, physical therapy assessment, plan of care and goals were reviewed. ASSESSMENT:  Based on the objective data described below, the patient presents with Contact guard assistance level overall for transfers. Gait training completed 50 feet x2 using a quad cane and and L HHA and at the Minimum assistance level of assistance. Multiple standing rest breaks encouraged for balance recovery and safety/education. Will continue to follow while here in hospital to increase patient independence. Patient NOT cleared from therapy stand point to discharge home.    The following are barriers to independence while in acute care:   -Cognitive and/or behavioral: safety awareness and insight into deficits  -Medical condition: strength, standing balance, pulmonary tolerance, precautions and pain tolerance    -Other:       Prior level of function: Ridge with quad cane      PLAN:  Patient continues to benefit from skilled intervention to address the above impairments. Continue treatment per established plan of care. Recommendations and/or planned interventions:  Recommend with nursing patient to complete as able in order to maintain strength, endurance and independence: OOB to chair 3x/day and walking daily with Rashel with gat belt and quad cane assist. Thank you for your assistance. Discharge recommendations: Rehab at inpatient facility: patient can tolerate 3 hours of therapy  If above is not an option then recommend: Rehab at skilled nursing facility (SNF)    Patient's barriers to discharging home, in addition to above impairments: lives alone  history of falls. Equipment recommendations for successful discharge (if) home: pt owns RW and quad cane      SUBJECTIVE:   Patient stated ? I have. ..? Pt reports having been in the chair since her AM PT session despite PT educating pt on being in chair for no more than 2 x30 minute increments. Pt states she did mobilize with RA after \"an hour or so\" but this was not as instructed. OBJECTIVE DATA SUMMARY:   Critical Behavior:  Neurologic State: Alert  Orientation Level: Oriented X4          The patient stated 3/3 back precautions. Reviewed all 3 with patient. Functional Mobility Training:  Bed Mobility:  Rolling: Contact guard assistance; Additional time(HOB lowered and rail used on L)  Supine to Sit: (NT)  Sit to Supine: Minimum assistance; Additional time(cues to maintain L SL prior to roll supine)  Scooting: (NT)  Transfers:  Sit to Stand: Contact guard assistance(pt standing prior to instructions-pt slightly impulsive this afternoon)  Stand to Sit: Minimum assistance; Additional time(cues for alignment, reach back, controlled descent)  Balance:  Sitting: Intact  Standing: Impaired; With support  Standing - Static: Fair  Standing - Dynamic : Fair    Ambulation/Gait Training:  Distance (ft): 50 Feet (ft)(x2; decreased distanced ambulatd from AM session)  Assistive Device: Gait belt;Cane, quad(L HHA and R quad cane use)  Ambulation - Level of Assistance: Minimal assistance; Additional time  Gait Abnormalities: Antalgic;Decreased step clearance; Path deviations; Step to gait;Trunk sway increased  Base of Support: Center of gravity altered  Speed/Vivian: Slow;Pace decreased (<100 feet/min)  Step Length: Right shortened;Left shortened  Therapeutic Exercises:   APs, heel slides, and glut squeezes reviewed    Pain:  Post treatment:  3/10   Location: neck      Activity Tolerance:   Fair  Please refer to the flowsheet for vital signs taken during this treatment.     After treatment patient left:   Supine in bed  Call light within reach     COMMUNICATION/COLLABORATION:   The patient?s plan of care was discussed with: Registered Nurse    Cynthia Solares   Time Calculation: 16 mins

## 2019-03-29 LAB
GLUCOSE BLD STRIP.AUTO-MCNC: 108 MG/DL (ref 65–100)
GLUCOSE BLD STRIP.AUTO-MCNC: 124 MG/DL (ref 65–100)
GLUCOSE BLD STRIP.AUTO-MCNC: 197 MG/DL (ref 65–100)
GLUCOSE BLD STRIP.AUTO-MCNC: 229 MG/DL (ref 65–100)
SERVICE CMNT-IMP: ABNORMAL

## 2019-03-29 PROCEDURE — 74011250637 HC RX REV CODE- 250/637: Performed by: PHYSICIAN ASSISTANT

## 2019-03-29 PROCEDURE — 74011250637 HC RX REV CODE- 250/637: Performed by: NURSE PRACTITIONER

## 2019-03-29 PROCEDURE — 65270000029 HC RM PRIVATE

## 2019-03-29 PROCEDURE — 97535 SELF CARE MNGMENT TRAINING: CPT

## 2019-03-29 PROCEDURE — 82962 GLUCOSE BLOOD TEST: CPT

## 2019-03-29 PROCEDURE — 74011636637 HC RX REV CODE- 636/637: Performed by: PHYSICIAN ASSISTANT

## 2019-03-29 PROCEDURE — 97116 GAIT TRAINING THERAPY: CPT

## 2019-03-29 RX ADMIN — BENZONATATE 200 MG: 100 CAPSULE ORAL at 18:52

## 2019-03-29 RX ADMIN — CARVEDILOL 3.12 MG: 3.12 TABLET, FILM COATED ORAL at 09:38

## 2019-03-29 RX ADMIN — BENZONATATE 200 MG: 100 CAPSULE ORAL at 02:55

## 2019-03-29 RX ADMIN — METFORMIN HYDROCHLORIDE 500 MG: 500 TABLET, EXTENDED RELEASE ORAL at 22:19

## 2019-03-29 RX ADMIN — CYCLOBENZAPRINE HYDROCHLORIDE 10 MG: 10 TABLET, FILM COATED ORAL at 20:25

## 2019-03-29 RX ADMIN — ACETAMINOPHEN 1000 MG: 500 TABLET ORAL at 22:19

## 2019-03-29 RX ADMIN — LOPERAMIDE HYDROCHLORIDE 2 MG: 2 CAPSULE ORAL at 09:38

## 2019-03-29 RX ADMIN — OXAZEPAM 30 MG: 15 CAPSULE, GELATIN COATED ORAL at 22:19

## 2019-03-29 RX ADMIN — PRAVASTATIN SODIUM 80 MG: 40 TABLET ORAL at 22:19

## 2019-03-29 RX ADMIN — ACETAMINOPHEN 1000 MG: 500 TABLET ORAL at 02:50

## 2019-03-29 RX ADMIN — BUPROPION HYDROCHLORIDE 150 MG: 150 TABLET, EXTENDED RELEASE ORAL at 09:00

## 2019-03-29 RX ADMIN — HYDROCHLOROTHIAZIDE 25 MG: 25 TABLET ORAL at 09:38

## 2019-03-29 RX ADMIN — OXYCODONE HYDROCHLORIDE 10 MG: 5 TABLET ORAL at 07:02

## 2019-03-29 RX ADMIN — OXYCODONE HYDROCHLORIDE 10 MG: 5 TABLET ORAL at 18:49

## 2019-03-29 RX ADMIN — BENZOCAINE AND MENTHOL 1 LOZENGE: 15; 3.6 LOZENGE ORAL at 16:11

## 2019-03-29 RX ADMIN — OXYCODONE HYDROCHLORIDE 10 MG: 5 TABLET ORAL at 09:40

## 2019-03-29 RX ADMIN — METFORMIN HYDROCHLORIDE 500 MG: 500 TABLET, EXTENDED RELEASE ORAL at 07:02

## 2019-03-29 RX ADMIN — ACETAMINOPHEN 1000 MG: 500 TABLET ORAL at 09:38

## 2019-03-29 RX ADMIN — INSULIN LISPRO 2 UNITS: 100 INJECTION, SOLUTION INTRAVENOUS; SUBCUTANEOUS at 16:11

## 2019-03-29 RX ADMIN — POLYETHYLENE GLYCOL 3350 17 G: 17 POWDER, FOR SOLUTION ORAL at 09:37

## 2019-03-29 RX ADMIN — BENZOCAINE AND MENTHOL 1 LOZENGE: 15; 3.6 LOZENGE ORAL at 07:02

## 2019-03-29 RX ADMIN — Medication 5 ML: at 14:00

## 2019-03-29 RX ADMIN — Medication 10 ML: at 07:02

## 2019-03-29 RX ADMIN — FENOFIBRATE 145 MG: 145 TABLET ORAL at 09:38

## 2019-03-29 RX ADMIN — INSULIN LISPRO 2 UNITS: 100 INJECTION, SOLUTION INTRAVENOUS; SUBCUTANEOUS at 22:19

## 2019-03-29 RX ADMIN — SERTRALINE HYDROCHLORIDE 100 MG: 50 TABLET ORAL at 09:38

## 2019-03-29 RX ADMIN — BENZOCAINE AND MENTHOL 1 LOZENGE: 15; 3.6 LOZENGE ORAL at 18:54

## 2019-03-29 NOTE — PROGRESS NOTES
Problem: Self Care Deficits Care Plan (Adult)  Goal: *Acute Goals and Plan of Care (Insert Text)  Description  Occupational Therapy Goals  Initiated 3/28/2019    1. Patient will perform lower body dressing with minimal assistance/contact guard assist using AE PRN within 4 days. 2.  Patient will perform toileting with minimal assistance/contact guard assist using most appropriate DME within 4 days. 3.  Patient will perform standing ADLs 5 mins at minimal assistance/contact guard assist within 4 days. 4.  Patient will don/doff cervical brace at minimal assistance/contact guard assist within 4 days. 5.  Patient will verbalize/demonstrate 3/3 cervical precautions during ADL tasks without cues within 4 days. Outcome: Progressing Towards Goal   OCCUPATIONAL THERAPY TREATMENT: Cervical spine  Patient: Asya Hunter (89 y.o. female)  Date: 3/29/2019  Diagnosis: Cervical stenosis of spine [M48.02] <principal problem not specified>  Procedure(s) (LRB):  C5-C6 C6-C7 ANTERIOR CERVICAL DISECTOMY WITH FUSION (N/A) 3 Days Post-Op  Precautions: Back, Fall(cervical brace)  Chart, occupational therapy assessment, plan of care, and goals were reviewed. ASSESSMENT:   The patient presents with Stand-by assistance upper body ADLs, Stand-by assistance lower body ADLs, and Stand-by assistance assist functional mobility. The following are barriers to ADL independence while in acute care:   - Cognitive and/or behavioral: perception of pain  - Medical condition: functional reach, standing balance and coordination    - Other: urinary incontinence      Prior level of function: largely IND        PLAN:  Patient continues to benefit from skilled intervention to address the above impairments. Continue treatment per established plan of care.   Recommendations and/or planned interventions:  Continued practice with functional reach high/low; toileting    Discharge recommendations: Rehab at inpatient facility: patient can tolerate 3 hours of therapy  If above is not an option then recommend: Home health (to increase independence and safety)    Barriers to discharging home, in addition to above listed impairments: lives alone. Equipment recommendations for successful discharge (if) home: patient has all necessary equipment      SUBJECTIVE:   Patient stated ? I feel like this is only a temporary handicap and I could go home if I hired someone to help out around the house. ?    OBJECTIVE DATA SUMMARY:   Cognitive/Behavioral Status:  Neurologic State: Alert  Orientation Level: Oriented X4  Cognition: Appropriate decision making  Perception: Appears intact  Perseveration: No perseveration noted  Safety/Judgement: Insight into deficits    Functional Mobility and Transfers for ADLs:  Bed Mobility:       Transfers:  Sit to Stand: Stand-by assistance          Balance:  Sitting: Intact  Standing: Impaired; With support(using cane)  Standing - Static: Good  Standing - Dynamic : Good;Fair    ADL Intervention:                                Toileting  Adaptive Equipment: (edu on toilet tongs)    Cognitive Retraining  Safety/Judgement: Insight into deficits    Patient instructed and demonstrated 3/3 cervical spine precautions with verbal cues. Patient instructed and indicated understanding the benefits of maintaining activity tolerance, functional mobility, and independence with self care tasks during acute stay  to ensure safe return home and to baseline. Encouraged patient to increase frequency and duration OOB, not sitting longer than 30 mins without marching/walking with staff, be out of bed for all meals, perform daily ADLs (as approved by RN/MD regarding bathing etc), and performing functional mobility to/from bathroom.  Patient instruction and indicated understanding on body mechanics, ergonomics and gravitational force on the spine during different body positions to plan activities in prep for return home to complete basic ADLs, instrumental ADLs and back to work safely. Bathing: Patient instructed and indicated understanding when bathing to not submerge wound in water, stand to shower or sponge bathe, cover wound with plastic and tape to ensure no water reaches bandage/wound without cues. Dressing brace: Patient instructed and demonstrated while in front of mirror to don/doff velcro on brace using dominant side, keeping non-dominant side intact. Instruction and indicated understanding in removal of fabric pieces, placement of clean pieces, don brace, then can hand wash and allow air dry. Toileting: Patient instructed on the benefits of using flushable wet wipes and toilet tongs if decreased reach or pain for teresa care. Also, the benefits of a reacher to aid in clothing management. Home safety: Patient instructed and indicated understanding on home modifications and safety [raise height of ADL objects (i.e. clothing, sink items, fridge items, items to mouth when grooming), change of floor surfaces, clear pathways] to increase independence and fall prevention. Standing: Patient instructed and indicated understanding to walk up to sink/counter top/surfaces, step into walker, square off while using objects, slide objects along surfaces, to increase adherence to back precautions and fall prevention.           COMMUNICATION/COLLABORATION:   The patient?s plan of care was discussed with: Physical Therapist and Registered Nurse    Jaimee Lucero  Time Calculation: 11 mins

## 2019-03-29 NOTE — PROGRESS NOTES
Problem: Mobility Impaired (Adult and Pediatric)  Goal: *Acute Goals and Plan of Care (Insert Text)  Description  Physical Therapy Goals  Initiated 3/27/2019    1. Patient will move from supine to sit and sit to supine , scoot up and down and roll side to side in bed with modified independence within 4 days. 2. Patient will perform sit to stand with modified independence within 4 days. 3. Patient will ambulate with modified independence for 150 feet with the least restrictive device within 4 days. 4. Patient will ascend/descend 13 stairs with 1 handrail(s) with modified independence within 4 days. 5. Patient will verbalize and demonstrate understanding of spinal precautions (No bending, lifting greater than 5 lbs, or twisting; log-roll technique; frequent repositioning as instructed) within 4 days. Outcome: Progressing Towards Goal  PHYSICAL THERAPY TREATMENT  Patient: Pete Moore (14 y.o. female)  Date: 3/29/2019  Diagnosis: Cervical stenosis of spine [M48.02] <principal problem not specified>  Procedure(s) (LRB):  C5-C6 C6-C7 ANTERIOR CERVICAL DISECTOMY WITH FUSION (N/A) 3 Days Post-Op  Precautions: Back, Fall(cervical brace)  Chart, physical therapy assessment, plan of care and goals were reviewed. ASSESSMENT:  Based on the objective data described below, the patient presents with Minimum assistance level overall for transfers. Gait training completed 100 feet using a quad cane and at the Minimum assistance level of assistance. The following are barriers to independence while in acute care:   -Cognitive and/or behavioral: safety awareness and decreased insight into deficits   -Medical condition: strength, functional endurance and standing balance    -Other:       Prior level of function: Modified indep with quad cane      PLAN:  Patient continues to benefit from skilled intervention to address the above impairments. Continue treatment per established plan of care.     Discharge recommendations: Rehab: patient is working towards tolerating 3 hours of therapy  If above is not an option then recommend: Rehab at skilled nursing facility (SNF)    Patient's barriers to discharging home, in addition to above impairments: lives alone. Equipment recommendations for successful discharge (if) home: none identified      SUBJECTIVE:   Patient stated ?my collar came off during th night - so my neck is uncomfortable? OBJECTIVE DATA SUMMARY:   Critical Behavior:  Neurologic State: Alert  Orientation Level: Oriented X4  Cognition: Appropriate decision making, Appropriate for age attention/concentration, Appropriate safety awareness  Safety/Judgement: Awareness of environment, Good awareness of safety precautions  Functional Mobility Training:  Transfers:  Sit to Stand: Stand-by assistance  Stand to Sit: Stand-by assistance                             Balance:  Sitting: Intact  Standing: Impaired; With support  Standing - Static: Good;Constant support  Standing - Dynamic : Fair  Ambulation/Gait Training:  Distance (ft): 100 Feet (ft)  Assistive Device: Gait belt;Cane, quad  Ambulation - Level of Assistance: Minimal assistance        Gait Abnormalities: Decreased step clearance; Path deviations(left foot placement crossing midline at times)        Base of Support: Center of gravity altered;Narrowed     Speed/Viivan: Pace decreased (<100 feet/min)  Step Length: Left shortened;Right shortened   Pt required verbal cues to stop and regain balance - proper use of quad cane - place all four feet on floor; advance quad cane right hand with left leg. Pain:  Pre treatment: 5/10 Neck/shoulders  Post treatment:  4/10 improved following mobility    Activity Tolerance:   Fair  Please refer to the flowsheet for vital signs taken during this treatment.     After treatment patient left:   Up in chair  Call light within reach  RN notified  Nurse at bedside     COMMUNICATION/COLLABORATION:   The patient?s plan of care was discussed with: Registered Nurse    Chucky Keys, PT   Time Calculation: 15 mins

## 2019-03-29 NOTE — PROGRESS NOTES
Bedside shift change report given to Cari (oncoming nurse) by Juani Miranda (offgoing nurse). Report included the following information SBAR.

## 2019-03-29 NOTE — PROGRESS NOTES
Cm spoke with [facility] regarding patient; they are still waiting on insurance authorization. Cm met with patient to discuss plan B incase denied. She would like to go home with home health and private duty if needed. Cm provided her with a list of private duty agencies to work on if needed.   Chato SAN, ACM

## 2019-03-29 NOTE — PROGRESS NOTES
Orthopedic Spine Progress Note  Post Op day: 3 Days Post-Op    2019 7:50 AM   Admit Date: 3/26/2019  Procedure: Procedure(s):  C5-C6 C6-C7 ANTERIOR CERVICAL DISECTOMY WITH FUSION    Subjective:     Rosa Elena Castro appears well. Pain seems to be managed. Tolerating diet  No N/V  Voiding    Pain Control:   Pain Assessment  Pain Scale 1: Numeric (0 - 10)  Pain Intensity 1: 5  Pain Onset 1: post op  Pain Location 1: Neck, Shoulder  Pain Orientation 1: Anterior, Left, Right  Pain Description 1: Aching  Pain Intervention(s) 1: Medication (see MAR), Repositioned, Rest, Relaxation technique    Objective:          Physical Exam:  General:  Alert and oriented. No acute distress. Heart:  Respirations unlabored. Abdomen:   Extremities: Soft, non-tender. No evidence of cyanosis. Pulses palpable in both upper and lower extremities. Neurologic:  Musculoskeletal:  No new motor deficits. Neurovascular exam within normal limits. Sensation stable. Motor: unchanged C5-T1 and L2-S1. Ze's sign negative in bilateral lower extremities. Calves soft, nontender upon palpation and with passive twitch. Moves both upper and lower extremities. Incision: clean, dry, and intact. No significant erythema or swelling. No active drainage noted. Vital Signs:    Blood pressure 152/90, pulse 65, temperature 98 °F (36.7 °C), resp. rate 17, height 5' 4\" (1.626 m), weight 86.2 kg (190 lb), SpO2 98 %.   Temp (24hrs), Av.3 °F (36.8 °C), Min:97.3 °F (36.3 °C), Max:98.9 °F (37.2 °C)      LAB:    Recent Labs     19  1234   HCT 40.5   HGB 13.1        Lab Results   Component Value Date/Time    Sodium 140 2019 12:34 PM    Potassium 4.0 2019 12:34 PM    Chloride 106 2019 12:34 PM    CO2 26 2019 12:34 PM    Glucose 261 (H) 2019 12:34 PM    BUN 12 2019 12:34 PM    Creatinine 0.92 2019 12:34 PM    Calcium 9.1 2019 12:34 PM       Intake/Output:No intake/output data recorded. 03/27 1901 - 03/29 0700  In: -   Out: 800 [Urine:800]    PT/OT:   Gait:  Gait  Base of Support: Center of gravity altered  Speed/Vivian: Slow, Pace decreased (<100 feet/min)  Step Length: Right shortened, Left shortened  Gait Abnormalities: Antalgic, Decreased step clearance, Path deviations, Step to gait, Trunk sway increased  Ambulation - Level of Assistance: Minimal assistance, Additional time  Distance (ft): 50 Feet (ft)(x2; decreased distanced ambulatd from AM session)  Assistive Device: Gait belt, Cane, quad(L HHA and R quad cane use)                 Assessment:   Patient is 3 Days Post-Op s/p Procedure(s):  C5-C6 C6-C7 ANTERIOR CERVICAL DISECTOMY WITH FUSION    Plan:     1. Continue PT/OT  2. Continue established methods of pain control  3. VTE Prophylaxes - TEDS & SCDs   4. Encouraged use ICS  5.   Discharge to rehab pending    Signed By: Deidra Jacobs PA-C

## 2019-03-29 NOTE — PROGRESS NOTES
Physical Therapy  Pt seen for pm session - patient amb with straight cane - 100' x 1 - with rest x 3 in standing to recover stability with contact guard to min assist.  Patient fatigues easily - however - increasing OOB tolerance and amb distance daily. Recommend inpatient rehab.   Brenda Bennett, PT

## 2019-03-30 LAB
GLUCOSE BLD STRIP.AUTO-MCNC: 113 MG/DL (ref 65–100)
GLUCOSE BLD STRIP.AUTO-MCNC: 136 MG/DL (ref 65–100)
GLUCOSE BLD STRIP.AUTO-MCNC: 160 MG/DL (ref 65–100)
GLUCOSE BLD STRIP.AUTO-MCNC: 202 MG/DL (ref 65–100)
SERVICE CMNT-IMP: ABNORMAL

## 2019-03-30 PROCEDURE — 97535 SELF CARE MNGMENT TRAINING: CPT

## 2019-03-30 PROCEDURE — 65270000029 HC RM PRIVATE

## 2019-03-30 PROCEDURE — 74011636637 HC RX REV CODE- 636/637: Performed by: PHYSICIAN ASSISTANT

## 2019-03-30 PROCEDURE — 74011250637 HC RX REV CODE- 250/637: Performed by: PHYSICIAN ASSISTANT

## 2019-03-30 PROCEDURE — 82962 GLUCOSE BLOOD TEST: CPT

## 2019-03-30 PROCEDURE — 97116 GAIT TRAINING THERAPY: CPT

## 2019-03-30 RX ADMIN — BENZOCAINE AND MENTHOL 1 LOZENGE: 15; 3.6 LOZENGE ORAL at 19:20

## 2019-03-30 RX ADMIN — CARVEDILOL 3.12 MG: 3.12 TABLET, FILM COATED ORAL at 08:23

## 2019-03-30 RX ADMIN — OXYCODONE HYDROCHLORIDE 10 MG: 5 TABLET ORAL at 19:20

## 2019-03-30 RX ADMIN — METFORMIN HYDROCHLORIDE 500 MG: 500 TABLET, EXTENDED RELEASE ORAL at 21:27

## 2019-03-30 RX ADMIN — ACETAMINOPHEN 1000 MG: 500 TABLET ORAL at 19:20

## 2019-03-30 RX ADMIN — Medication 10 ML: at 21:27

## 2019-03-30 RX ADMIN — CYCLOBENZAPRINE HYDROCHLORIDE 10 MG: 10 TABLET, FILM COATED ORAL at 21:27

## 2019-03-30 RX ADMIN — OXYCODONE HYDROCHLORIDE 10 MG: 5 TABLET ORAL at 07:02

## 2019-03-30 RX ADMIN — CLINDAMYCIN PHOSPHATE: 10 GEL TOPICAL at 08:26

## 2019-03-30 RX ADMIN — ACETAMINOPHEN 1000 MG: 500 TABLET ORAL at 10:07

## 2019-03-30 RX ADMIN — BENZONATATE 200 MG: 100 CAPSULE ORAL at 21:27

## 2019-03-30 RX ADMIN — SERTRALINE HYDROCHLORIDE 100 MG: 50 TABLET ORAL at 08:23

## 2019-03-30 RX ADMIN — OXYCODONE HYDROCHLORIDE 10 MG: 5 TABLET ORAL at 00:20

## 2019-03-30 RX ADMIN — Medication 10 ML: at 14:00

## 2019-03-30 RX ADMIN — BENZOCAINE AND MENTHOL 1 LOZENGE: 15; 3.6 LOZENGE ORAL at 00:20

## 2019-03-30 RX ADMIN — CLINDAMYCIN PHOSPHATE: 10 GEL TOPICAL at 00:26

## 2019-03-30 RX ADMIN — CLINDAMYCIN PHOSPHATE: 10 GEL TOPICAL at 21:28

## 2019-03-30 RX ADMIN — METFORMIN HYDROCHLORIDE 500 MG: 500 TABLET, EXTENDED RELEASE ORAL at 07:02

## 2019-03-30 RX ADMIN — HYDROCHLOROTHIAZIDE 25 MG: 25 TABLET ORAL at 08:23

## 2019-03-30 RX ADMIN — FENOFIBRATE 145 MG: 145 TABLET ORAL at 08:23

## 2019-03-30 RX ADMIN — OXYCODONE HYDROCHLORIDE 10 MG: 5 TABLET ORAL at 10:07

## 2019-03-30 RX ADMIN — OXAZEPAM 30 MG: 15 CAPSULE, GELATIN COATED ORAL at 21:27

## 2019-03-30 RX ADMIN — BENZOCAINE AND MENTHOL 1 LOZENGE: 15; 3.6 LOZENGE ORAL at 10:07

## 2019-03-30 RX ADMIN — CYCLOBENZAPRINE HYDROCHLORIDE 10 MG: 10 TABLET, FILM COATED ORAL at 08:30

## 2019-03-30 RX ADMIN — INSULIN LISPRO 2 UNITS: 100 INJECTION, SOLUTION INTRAVENOUS; SUBCUTANEOUS at 12:11

## 2019-03-30 RX ADMIN — INSULIN LISPRO 2 UNITS: 100 INJECTION, SOLUTION INTRAVENOUS; SUBCUTANEOUS at 21:27

## 2019-03-30 RX ADMIN — BUPROPION HYDROCHLORIDE 150 MG: 150 TABLET, EXTENDED RELEASE ORAL at 08:30

## 2019-03-30 RX ADMIN — PRAVASTATIN SODIUM 80 MG: 40 TABLET ORAL at 21:27

## 2019-03-30 NOTE — PROGRESS NOTES
ORTHO POST OP SPINE PROGRESS NOTE    2019  Admit Date: 3/26/2019  Admit Diagnosis: Cervical stenosis of spine [M48.02]  Procedure: Procedure(s):  C5-C6 C6-C7 ANTERIOR CERVICAL DISECTOMY WITH FUSION  Post Op day: 4 Days Post-Op    Subjective:     Sheree Caraballo was seen and examined at bedside. Pain well controlled. Complains of some coughing and sore throat. Continues to work with PT. Has been waiting on insurance auth for [facility] vs home with [facility]. Objective:     PT/OT:   Distance Ambulated:           Time Ambulated (min):        Ambulation Response: Activity Response: Tolerated well  Assistive Device:              Assistive Device: Cane (comment)    Vital Signs:    Blood pressure (!) 142/94, pulse 78, temperature 98.8 °F (37.1 °C), resp. rate 16, height 5' 4\" (1.626 m), weight 86.2 kg (190 lb), SpO2 96 %. Temp (24hrs), Av.7 °F (37.1 °C), Min:98.2 °F (36.8 °C), Max:98.9 °F (37.2 °C)      No intake/output data recorded.  1901 -  0700  In: -   Out: 500 [Urine:500]    LAB:    Recent Labs     19  1234   HGB 13.1   WBC 7.9            Physical Exam:  Gen: awake, alert, NAD  Neck: soft cervical collar in place  BL UE: motor 5/5 bilateral delt/biceps/triceps/WE/WF/FF/finger abductors. SILT C5-T1. 2/2 radial pulses.      Assessment:      Patient Active Problem List   Diagnosis Code    Controlled type 2 diabetes mellitus with stage 2 chronic kidney disease, with long-term current use of insulin ([facility]) E11.22, N18.2, Z79.4    Non-seasonal allergic rhinitis J30.89    Class 1 obesity due to excess calories without serious comorbidity with body mass index (BMI) of 33.0 to 33.9 in adult E66.09, Z68.33    Rosacea L71.9    Mixed hyperlipidemia E78.2    Anxiety F41.9    GI bleed K92.2    Overactive bladder N32.81    Polymyalgia rheumatica (HCC) M35.3    IBS (irritable bowel syndrome) K58.9    Hypertension with renal disease I12.9    CKD (chronic kidney disease), stage II N18.2    Avascular necrosis of hip (HCC) M87.059    Abnormal LFTs R94.5    Annual physical exam Z00.00    Vitamin D deficiency E55.9    Recurrent depression (HCC) F33.9    Primary osteoarthritis involving multiple joints M15.0    Sleep disturbance G47.9    Pre-operative cardiovascular examination Z01.810    Lumbar disc disease M51.9    Spinal stenosis, lumbar M48.061    Medicare annual wellness visit, subsequent Z00.00    Dyspnea on exertion R06.09    URI (upper respiratory infection) J06.9    Spinal stenosis, cervical region M48.02    Cervical stenosis of spine M48.02       Plan:   68 yo female s/p C5-6 C6-7 ACDF POD#4  1. WBAT  2. Continue PT/OT  3. Analgesia as needed  4. DVT prophylaxis - TEDS and SCDs  5. Cough/sore throat likely secondary to ET tube for anesthesia and exposure necessary to accomplish procedure safely - discussed with patient.   6. Discharge pending auth        Signed By: Stephanie Forrest DO

## 2019-03-30 NOTE — PROGRESS NOTES
Problem: Self Care Deficits Care Plan (Adult)  Goal: *Acute Goals and Plan of Care (Insert Text)  Description  Occupational Therapy Goals  Initiated 3/28/2019    1. Patient will perform lower body dressing with minimal assistance/contact guard assist using AE PRN within 4 days. 2.  Patient will perform toileting with minimal assistance/contact guard assist using most appropriate DME within 4 days. 3.  Patient will perform standing ADLs 5 mins at minimal assistance/contact guard assist within 4 days. 4.  Patient will don/doff cervical brace at minimal assistance/contact guard assist within 4 days. 5.  Patient will verbalize/demonstrate 3/3 cervical precautions during ADL tasks without cues within 4 days. Outcome: Progressing Towards Goal     Problem: Patient Education: Go to Patient Education Activity  Goal: Patient/Family Education  Outcome: Progressing Towards Goal   OCCUPATIONAL THERAPY TREATMENT  Patient: Hunter Mg (8 y.o. female)  Date: 3/30/2019  Diagnosis: Cervical stenosis of spine [M48.02] <principal problem not specified>  Procedure(s) (LRB):  C5-C6 C6-C7 ANTERIOR CERVICAL DISECTOMY WITH FUSION (N/A) 4 Days Post-Op  Precautions: Back, Fall(cervical brace) cervical precautions  Chart, occupational therapy assessment, plan of care, and goals were reviewed. ASSESSMENT:   The patient presents with Minimum assistance upper body ADLs, Minimum assistance lower body ADLs, and Minimum assistance assist functional mobility. The following are barriers to ADL independence while in acute care:   - Cognitive and/or behavioral: none   - Medical condition: ROM, strength, functional endurance, standing balance, cardiopulmonary tolerance, precautions, pain tolerance, sensation and incontinence         Prior level of function: mod I       PLAN:  Patient continues to benefit from skilled intervention to address the above impairments. Continue treatment per established plan of care.   Recommendations and/or planned interventions:  UE AROM and strengthening for ADLs and bed mobility, pain management, endurance training    Discharge recommendations: Rehab at inpatient facility: patient can tolerate 3 hours of therapy  If above is not an option then recommend: Home health (to increase independence and safety)    Barriers to discharging home, in addition to above listed impairments: lives alone  total assist driving to follow up medical appointment(s)/groceries/obtain medication. Equipment recommendations for successful discharge (if) home: bedside commode and shower chair     SUBJECTIVE:   Patient stated ? I have incontinence and its difficult to control-no change since surgery. ?    OBJECTIVE DATA SUMMARY:   Cognitive/Behavioral Status:  Neurologic State: Alert; Appropriate for age  Orientation Level: Oriented X4  Cognition: Appropriate decision making; Appropriate for age attention/concentration; Appropriate safety awareness; Follows commands; Impulsive  Perception: Appears intact  Perseveration: No perseveration noted  Safety/Judgement: Fall prevention;Home safety; Insight into deficits; Decreased insight into deficits(learning cervical precautions but not yet consistent)    Functional Mobility and Transfers for ADLs:  Bed Mobility:  Rolling: Supervision(cues to not pull on bed rail)  Supine to Sit: Minimum assistance; Additional time(7/10 pain in cervical region)  Sit to Supine: Minimum assistance; Moderate assistance(A B LEs into bed)  Scooting: Contact guard assistance    Transfers:  Sit to Stand: Supervision; Additional time(vc for safety with cervical precautions; heavy use of UEs)  Functional Transfers  Toilet Transfer : Minimum assistance(recommend elevated toilet seat)  Bed to Chair: Contact guard assistance    Balance:  Sitting: Intact  Standing: Impaired; Without support  Standing - Static: Good  Standing - Dynamic : Fair(fatigues and sway increases)    ADL Intervention:  Feeding  Feeding Assistance: Modified independent(increased time; cues to not look at food)    Grooming  Grooming Assistance: Minimum assistance(A back of head on R side)    Upper Body Bathing  Bathing Assistance: Minimum assistance    Lower Body Bathing  Bathing Assistance: Minimum assistance    Upper Body Dressing Assistance  Dressing Assistance: Contact guard assistance    Lower Body Dressing Assistance  Dressing Assistance: Contact guard assistance;Minimum assistance    Toileting  Toileting Assistance: Minimum assistance; Total assistance(dependent); Set-up; Supervision(toilet aide training vs bidet)    Cognitive Retraining  Attention to Task: Single task  Maintains Attention For (Time): Greater than 10 minutes  Following Commands: Follows one step commands/directions; Follows two step commands/directions  Safety/Judgement: Fall prevention;Home safety; Insight into deficits; Decreased insight into deficits(learning cervical precautions but not yet consistent)        Pain:  Pre treatment: 4 /10   During treatment: 7/10  Post treatment:  7/10   Location: neck, throat  Description:aching  Aggravating factors: bed mobility, UE use    Activity Tolerance:   Fair, SpO2 stable on RA, requires rest breaks and observed SOB with actity  Please refer to the flowsheet for vital signs taken during this treatment.     After treatment patient left:   Supine in bed  Call light within reach  Side rails x 2     COMMUNICATION/COLLABORATION:   The patient?s plan of care was discussed with: Registered Nurse    Laurel Roberts OTR/L  Time Calculation: 45 mins

## 2019-03-30 NOTE — PROGRESS NOTES
Problem: Mobility Impaired (Adult and Pediatric)  Goal: *Acute Goals and Plan of Care (Insert Text)  Description  Physical Therapy Goals  Initiated 3/27/2019    1. Patient will move from supine to sit and sit to supine , scoot up and down and roll side to side in bed with modified independence within 4 days. 2. Patient will perform sit to stand with modified independence within 4 days. 3. Patient will ambulate with modified independence for 150 feet with the least restrictive device within 4 days. 4. Patient will ascend/descend 13 stairs with 1 handrail(s) with modified independence within 4 days. 5. Patient will verbalize and demonstrate understanding of spinal precautions (No bending, lifting greater than 5 lbs, or twisting; log-roll technique; frequent repositioning as instructed) within 4 days. 3/30/2019 1514 by Sravanthi Crawford  Outcome: Progressing Towards Goal    Pt seen for PT this afternoon. Pt received supine in bed w/ knees bent. Reports no pain in neck but does have HA and has been using ice pack to aid w/ pain relief. Required total A for donning of socks. Pt required Supervision- CGA for functional transfers and CGA-Min A x1 for amb w/ [facility]. Gait unsteady despite use of quad cane, but able to tolerate approx 100 Ft total. Demonstrated antalgic,ataxic gait w/ path deviations and increased trunk sway w/ altered arm swing;Shuffled pace. PLOF: Mod I w/ quad cane. Pt awaiting insurance auth for [facility] at this time. PT to continue to follow. Pt returned to bed w/all items in reach and needs met. RN aware.      Sravanthi Crawford, PTA   Time spent: 12 minutes

## 2019-03-31 LAB
GLUCOSE BLD STRIP.AUTO-MCNC: 129 MG/DL (ref 65–100)
GLUCOSE BLD STRIP.AUTO-MCNC: 160 MG/DL (ref 65–100)
GLUCOSE BLD STRIP.AUTO-MCNC: 170 MG/DL (ref 65–100)
GLUCOSE BLD STRIP.AUTO-MCNC: 179 MG/DL (ref 65–100)
SERVICE CMNT-IMP: ABNORMAL

## 2019-03-31 PROCEDURE — 74011636637 HC RX REV CODE- 636/637: Performed by: PHYSICIAN ASSISTANT

## 2019-03-31 PROCEDURE — 97116 GAIT TRAINING THERAPY: CPT

## 2019-03-31 PROCEDURE — 74011250637 HC RX REV CODE- 250/637: Performed by: PHYSICIAN ASSISTANT

## 2019-03-31 PROCEDURE — 65270000029 HC RM PRIVATE

## 2019-03-31 PROCEDURE — 82962 GLUCOSE BLOOD TEST: CPT

## 2019-03-31 RX ADMIN — FENOFIBRATE 145 MG: 145 TABLET ORAL at 09:22

## 2019-03-31 RX ADMIN — Medication 10 ML: at 22:41

## 2019-03-31 RX ADMIN — ACETAMINOPHEN 1000 MG: 500 TABLET ORAL at 22:40

## 2019-03-31 RX ADMIN — METFORMIN HYDROCHLORIDE 500 MG: 500 TABLET, EXTENDED RELEASE ORAL at 07:24

## 2019-03-31 RX ADMIN — CYCLOBENZAPRINE HYDROCHLORIDE 10 MG: 10 TABLET, FILM COATED ORAL at 09:22

## 2019-03-31 RX ADMIN — OXAZEPAM 30 MG: 15 CAPSULE, GELATIN COATED ORAL at 22:40

## 2019-03-31 RX ADMIN — CLINDAMYCIN PHOSPHATE: 10 GEL TOPICAL at 21:00

## 2019-03-31 RX ADMIN — INSULIN LISPRO 2 UNITS: 100 INJECTION, SOLUTION INTRAVENOUS; SUBCUTANEOUS at 11:56

## 2019-03-31 RX ADMIN — HYDROCHLOROTHIAZIDE 25 MG: 25 TABLET ORAL at 09:22

## 2019-03-31 RX ADMIN — Medication 10 ML: at 07:26

## 2019-03-31 RX ADMIN — ACETAMINOPHEN 1000 MG: 500 TABLET ORAL at 09:22

## 2019-03-31 RX ADMIN — BUPROPION HYDROCHLORIDE 150 MG: 150 TABLET, EXTENDED RELEASE ORAL at 09:27

## 2019-03-31 RX ADMIN — SERTRALINE HYDROCHLORIDE 100 MG: 50 TABLET ORAL at 09:22

## 2019-03-31 RX ADMIN — CLINDAMYCIN PHOSPHATE: 10 GEL TOPICAL at 09:23

## 2019-03-31 RX ADMIN — OXYCODONE HYDROCHLORIDE 10 MG: 5 TABLET ORAL at 22:47

## 2019-03-31 RX ADMIN — OXYCODONE HYDROCHLORIDE 10 MG: 5 TABLET ORAL at 01:48

## 2019-03-31 RX ADMIN — OXYCODONE HYDROCHLORIDE 10 MG: 5 TABLET ORAL at 10:40

## 2019-03-31 RX ADMIN — OXYCODONE HYDROCHLORIDE 10 MG: 5 TABLET ORAL at 17:05

## 2019-03-31 RX ADMIN — BENZOCAINE AND MENTHOL 1 LOZENGE: 15; 3.6 LOZENGE ORAL at 09:22

## 2019-03-31 RX ADMIN — PRAVASTATIN SODIUM 80 MG: 40 TABLET ORAL at 22:40

## 2019-03-31 RX ADMIN — OXYCODONE HYDROCHLORIDE 10 MG: 5 TABLET ORAL at 13:45

## 2019-03-31 RX ADMIN — OXYCODONE HYDROCHLORIDE 10 MG: 5 TABLET ORAL at 07:29

## 2019-03-31 RX ADMIN — INSULIN LISPRO 2 UNITS: 100 INJECTION, SOLUTION INTRAVENOUS; SUBCUTANEOUS at 07:24

## 2019-03-31 RX ADMIN — CARVEDILOL 3.12 MG: 3.12 TABLET, FILM COATED ORAL at 09:22

## 2019-03-31 RX ADMIN — METFORMIN HYDROCHLORIDE 500 MG: 500 TABLET, EXTENDED RELEASE ORAL at 22:40

## 2019-03-31 RX ADMIN — BENZONATATE 200 MG: 100 CAPSULE ORAL at 09:22

## 2019-03-31 RX ADMIN — Medication 10 ML: at 13:45

## 2019-03-31 RX ADMIN — ACETAMINOPHEN 1000 MG: 500 TABLET ORAL at 17:05

## 2019-03-31 NOTE — PROGRESS NOTES
ORTHO POST OP SPINE PROGRESS NOTE    2019  Admit Date: 3/26/2019  Admit Diagnosis: Cervical stenosis of spine [M48.02]  Procedure: Procedure(s):  C5-C6 C6-C7 ANTERIOR CERVICAL DISECTOMY WITH FUSION  Post Op day: 5 Days Post-Op    Subjective:     Stephanie Mandel was seen and examined at bedside. Reports pain in posterior right sided neck. Cough and sore throat improving. Objective:     PT/OT:   Distance Ambulated:           Time Ambulated (min):        Ambulation Response: Activity Response: Tolerated well  Assistive Device:              Assistive Device: Cane (comment)    Vital Signs:    Blood pressure 140/90, pulse 84, temperature 98.5 °F (36.9 °C), resp. rate 15, height 5' 4\" (1.626 m), weight 86.2 kg (190 lb), SpO2 95 %. Temp (24hrs), Av.6 °F (37 °C), Min:98.2 °F (36.8 °C), Max:99.4 °F (37.4 °C)      No intake/output data recorded.  1901 -  0700  In: -   Out: 1300 [Urine:1300]    LAB:    No results for input(s): HGB, HGBEXT, WBC, PLT, PLTEXT, HGBEXT, PLTEXT in the last 72 hours. Physical Exam:  Gen: awake, alert, NAD  Neck: soft cervical collar in place  BL UE: motor 5/5 bilateral delt/biceps/triceps/WE/WF/FF/finger abductors. SILT C5-T1. 2/2 radial pulses.        Assessment:      Patient Active Problem List   Diagnosis Code    Controlled type 2 diabetes mellitus with stage 2 chronic kidney disease, with long-term current use of insulin ([facility]) E11.22, N18.2, Z79.4    Non-seasonal allergic rhinitis J30.89    Class 1 obesity due to excess calories without serious comorbidity with body mass index (BMI) of 33.0 to 33.9 in adult E66.09, Z68.33    Rosacea L71.9    Mixed hyperlipidemia E78.2    Anxiety F41.9    GI bleed K92.2    Overactive bladder N32.81    Polymyalgia rheumatica (HCC) M35.3    IBS (irritable bowel syndrome) K58.9    Hypertension with renal disease I12.9    CKD (chronic kidney disease), stage II N18.2    Avascular necrosis of hip (HCC) M87.059    Abnormal LFTs R94.5    Annual physical exam Z00.00    Vitamin D deficiency E55.9    Recurrent depression (HCC) F33.9    Primary osteoarthritis involving multiple joints M15.0    Sleep disturbance G47.9    Pre-operative cardiovascular examination Z01.810    Lumbar disc disease M51.9    Spinal stenosis, lumbar M48.061    Medicare annual wellness visit, subsequent Z00.00    Dyspnea on exertion R06.09    URI (upper respiratory infection) J06.9    Spinal stenosis, cervical region M48.02    Cervical stenosis of spine M48.02       Plan:   70 yo female s/p C5-6 C6-7 ACDF POD#5  1. WBAT  2. Continue PT/OT  3. Analgesia as needed  4. DVT prophylaxis - TEDS and SCDs  5. Cough/sore throat improving  6. Posterior neck pain appears to be muscular, may be positional/activity related  6.  Discharge pending authorization        Signed By: Mallory Payan DO

## 2019-03-31 NOTE — PROGRESS NOTES
Problem: Mobility Impaired (Adult and Pediatric)  Goal: *Acute Goals and Plan of Care (Insert Text)  Description  Physical Therapy Goals  Initiated 3/27/2019    1. Patient will move from supine to sit and sit to supine , scoot up and down and roll side to side in bed with modified independence within 4 days. 2. Patient will perform sit to stand with modified independence within 4 days. 3. Patient will ambulate with modified independence for 150 feet with the least restrictive device within 4 days. 4. Patient will ascend/descend 13 stairs with 1 handrail(s) with modified independence within 4 days. 5. Patient will verbalize and demonstrate understanding of spinal precautions (No bending, lifting greater than 5 lbs, or twisting; log-roll technique; frequent repositioning as instructed) within 4 days. Outcome: Progressing Towards Goal  PHYSICAL THERAPY TREATMENT  Patient: Antonio Garcia (29 y.o. female)  Date: 3/31/2019  Diagnosis: Cervical stenosis of spine [M48.02] <principal problem not specified>  Procedure(s) (LRB):  C5-C6 C6-C7 ANTERIOR CERVICAL DISECTOMY WITH FUSION (N/A) 5 Days Post-Op  Precautions: Back, Fall(cervical brace)  Chart, physical therapy assessment, plan of care and goals were reviewed. ASSESSMENT:  PT approached patient laying in bed, agreeable to participate with therapy. Patient performed supine-sit and scooting with supervision assist. Patient ambulated 150' using quad cane with CGA due to increased step length, narrowed CHRISTINA, ataxia, increased forward trunk lean, and tendency to increase her speed. PT provided repetitive cuing for proper placement of cane to allow for widened CHRISTINA and to maintain steady pace in order to improve her balance. Patient required multiple standing rest breaks in order to re-adjust her patterning. Patient left sitting up in chair, call bell within reach, no complaints.  Patient is progressing well toward goals and is ready to go to rehab for additional therapy. Progression toward goals:  ?      Improving appropriately and progressing toward goals  ? Improving slowly and progressing toward goals  ? Not making progress toward goals and plan of care will be adjusted     PLAN:  Patient continues to benefit from skilled intervention to address the above impairments. Continue treatment per established plan of care. Discharge Recommendations:  Rehab  Further Equipment Recommendations for Discharge:  none      SUBJECTIVE:   Patient stated ? My neck feels much better. ?       OBJECTIVE DATA SUMMARY:   Critical Behavior:  Neurologic State: Alert  Orientation Level: Oriented X4  Cognition: Appropriate decision making, Appropriate for age attention/concentration, Appropriate safety awareness, Follows commands  Safety/Judgement: Fall prevention, Home safety, Insight into deficits, Decreased insight into deficits(learning cervical precautions but not yet consistent)  Functional Mobility Training:  Bed Mobility:  Log Rolling: Supervision  Supine to Sit: Supervision  Sit to Supine: (patient left sitting up in chair)             Transfers:  Sit to Stand: Stand-by assistance  Stand to Sit: Stand-by assistance                             Balance:  Sitting: Intact  Standing: Impaired  Standing - Static: Constant support;Good  Standing - Dynamic : Fair;Constant support  Ambulation/Gait Training:  Distance (ft): 150 Feet (ft)  Assistive Device: Cane, quad;Gait belt  Ambulation - Level of Assistance: Contact guard assistance        Gait Abnormalities: Ataxic;Trunk sway increased        Base of Support: Narrowed     Speed/Vivian: Fluctuations  Step Length: Left lengthened;Right lengthened                Stairs:            Therapeutic Exercises:     Pain:  Pain Scale 1: Numeric (0 - 10)  Pain Intensity 1: 4  Pain Location 1: Neck  Pain Orientation 1: Posterior  Pain Description 1: Sore  Pain Intervention(s) 1: Rest;Cold pack  Activity Tolerance:   No SOB or dizziness noted. Please refer to the flowsheet for vital signs taken during this treatment. After treatment:   ?  Patient left in no apparent distress sitting up in chair  ? Patient left in no apparent distress in bed  ? Call bell left within reach  ? Nursing notified  ? Caregiver present  ?   Bed alarm activated    COMMUNICATION/COLLABORATION:   The patient?s plan of care was discussed with: Registered Nurse    Katalina Carr, PT   Time Calculation: 15 mins

## 2019-04-01 LAB
GLUCOSE BLD STRIP.AUTO-MCNC: 124 MG/DL (ref 65–100)
GLUCOSE BLD STRIP.AUTO-MCNC: 155 MG/DL (ref 65–100)
GLUCOSE BLD STRIP.AUTO-MCNC: 159 MG/DL (ref 65–100)
GLUCOSE BLD STRIP.AUTO-MCNC: 170 MG/DL (ref 65–100)
SERVICE CMNT-IMP: ABNORMAL

## 2019-04-01 PROCEDURE — 77030012893

## 2019-04-01 PROCEDURE — 74011250637 HC RX REV CODE- 250/637: Performed by: PHYSICIAN ASSISTANT

## 2019-04-01 PROCEDURE — 82962 GLUCOSE BLOOD TEST: CPT

## 2019-04-01 PROCEDURE — 65270000029 HC RM PRIVATE

## 2019-04-01 PROCEDURE — 97116 GAIT TRAINING THERAPY: CPT

## 2019-04-01 PROCEDURE — 97535 SELF CARE MNGMENT TRAINING: CPT

## 2019-04-01 PROCEDURE — 74011636637 HC RX REV CODE- 636/637: Performed by: PHYSICIAN ASSISTANT

## 2019-04-01 PROCEDURE — 97110 THERAPEUTIC EXERCISES: CPT

## 2019-04-01 RX ORDER — OXYCODONE HYDROCHLORIDE 5 MG/1
5-10 TABLET ORAL
Qty: 60 TAB | Refills: 0 | Status: SHIPPED | OUTPATIENT
Start: 2019-04-01 | End: 2022-09-28 | Stop reason: SDUPTHER

## 2019-04-01 RX ADMIN — ACETAMINOPHEN 1000 MG: 500 TABLET ORAL at 21:40

## 2019-04-01 RX ADMIN — OXYCODONE HYDROCHLORIDE 10 MG: 5 TABLET ORAL at 09:22

## 2019-04-01 RX ADMIN — FENOFIBRATE 145 MG: 145 TABLET ORAL at 09:15

## 2019-04-01 RX ADMIN — METFORMIN HYDROCHLORIDE 500 MG: 500 TABLET, EXTENDED RELEASE ORAL at 06:13

## 2019-04-01 RX ADMIN — BUPROPION HYDROCHLORIDE 150 MG: 150 TABLET, EXTENDED RELEASE ORAL at 16:58

## 2019-04-01 RX ADMIN — BENZOCAINE AND MENTHOL 1 LOZENGE: 15; 3.6 LOZENGE ORAL at 20:48

## 2019-04-01 RX ADMIN — CARVEDILOL 3.12 MG: 3.12 TABLET, FILM COATED ORAL at 09:16

## 2019-04-01 RX ADMIN — INSULIN LISPRO 2 UNITS: 100 INJECTION, SOLUTION INTRAVENOUS; SUBCUTANEOUS at 16:59

## 2019-04-01 RX ADMIN — CLINDAMYCIN PHOSPHATE: 10 GEL TOPICAL at 09:24

## 2019-04-01 RX ADMIN — Medication 10 ML: at 21:51

## 2019-04-01 RX ADMIN — Medication 10 ML: at 15:43

## 2019-04-01 RX ADMIN — ACETAMINOPHEN 1000 MG: 500 TABLET ORAL at 06:13

## 2019-04-01 RX ADMIN — SERTRALINE HYDROCHLORIDE 100 MG: 50 TABLET ORAL at 09:15

## 2019-04-01 RX ADMIN — HYDROCHLOROTHIAZIDE 25 MG: 25 TABLET ORAL at 09:15

## 2019-04-01 RX ADMIN — INSULIN LISPRO 2 UNITS: 100 INJECTION, SOLUTION INTRAVENOUS; SUBCUTANEOUS at 12:29

## 2019-04-01 RX ADMIN — Medication 10 ML: at 06:14

## 2019-04-01 RX ADMIN — METFORMIN HYDROCHLORIDE 500 MG: 500 TABLET, EXTENDED RELEASE ORAL at 21:40

## 2019-04-01 RX ADMIN — PRAVASTATIN SODIUM 80 MG: 40 TABLET ORAL at 21:41

## 2019-04-01 RX ADMIN — CLINDAMYCIN PHOSPHATE: 10 GEL TOPICAL at 21:50

## 2019-04-01 RX ADMIN — OXAZEPAM 30 MG: 15 CAPSULE, GELATIN COATED ORAL at 21:41

## 2019-04-01 RX ADMIN — BENZONATATE 200 MG: 100 CAPSULE ORAL at 20:47

## 2019-04-01 RX ADMIN — ACETAMINOPHEN 1000 MG: 500 TABLET ORAL at 12:32

## 2019-04-01 NOTE — PROGRESS NOTES
Problem: Self Care Deficits Care Plan (Adult)  Goal: *Acute Goals and Plan of Care (Insert Text)  Description  Occupational Therapy Goals  Initiated 3/28/2019    1. Patient will perform lower body dressing with minimal assistance/contact guard assist using AE PRN within 4 days. 2.  Patient will perform toileting with minimal assistance/contact guard assist using most appropriate DME within 4 days. 3.  Patient will perform standing ADLs 5 mins at minimal assistance/contact guard assist within 4 days. 4.  Patient will don/doff cervical brace at minimal assistance/contact guard assist within 4 days. 5.  Patient will verbalize/demonstrate 3/3 cervical precautions during ADL tasks without cues within 4 days. Outcome: Progressing Towards Goal   OCCUPATIONAL THERAPY TREATMENT: Cervical spine  Patient: Criss De (89 y.o. female)  Date: 4/1/2019  Diagnosis: Cervical stenosis of spine [M48.02] <principal problem not specified>  Procedure(s) (LRB):  C5-C6 C6-C7 ANTERIOR CERVICAL DISECTOMY WITH FUSION (N/A) 6 Days Post-Op  Precautions: Back, Fall(cervical brace)  Chart, occupational therapy assessment, plan of care, and goals were reviewed. ASSESSMENT:   The patient presents with Moderate Assistance upper body ADLs, Minimum assistance lower body ADLs, and Minimum assistance assist functional mobility. The following are barriers to ADL independence while in acute care:   - Cognitive and/or behavioral: safety awareness and insight into deficits  - Medical condition: ROM, strength, functional reach, standing balance and precautions    - Other: Patient continues to require verbal cues for maintaining cervical precautions during functional tasks. Patient reports right posterior aspect of neck up to R side of head is tender to touch. Prior level of function: IND with ADL and IADL tasks living alone       PLAN:  Patient continues to benefit from skilled intervention to address the above impairments.   Continue treatment per established plan of care. Recommendations and/or planned interventions:  Brace management    Discharge recommendations: Rehab at inpatient facility: patient can tolerate 3 hours of therapy  If above is not an option then recommend: Home health (to increase independence and safety)    Barriers to discharging home, in addition to above listed impairments: lives alone  total assist driving to follow up medical appointment(s)/groceries/obtain medication. Equipment recommendations for successful discharge (if) home: TBD      SUBJECTIVE:   Patient stated ? I have pain radiating all up the right side of my neck to my head.?    OBJECTIVE DATA SUMMARY:   Cognitive/Behavioral Status:  Neurologic State: Alert  Orientation Level: Oriented X4  Cognition: Impaired decision making  Perception: Appears intact  Perseveration: No perseveration noted  Safety/Judgement: Decreased insight into deficits; Decreased awareness of need for safety    Functional Mobility and Transfers for ADLs:  Bed Mobility:  Supine to Sit: Contact guard assistance  Sit to Supine: Supervision    Transfers:  Sit to Stand: Contact guard assistance  Functional Transfers  Toilet Transfer : Minimum assistance  Bed to Chair: Contact guard assistance    Balance:  Sitting: Intact  Standing: Impaired  Standing - Static: Fair  Standing - Dynamic : Fair    ADL Intervention:       Grooming  Brushing Teeth: Stand-by assistance              Upper Body Dressing Assistance  Orthotics(Brace): Moderate assistance         Toileting  Toileting Assistance: Minimum assistance    Cognitive Retraining  Safety/Judgement: Decreased insight into deficits; Decreased awareness of need for safety    Patient instructed and demonstrated 3/3 cervical spine precautions with verbal cues. Patient not maintaining cervical precautions with functional tasks, demonstrating poor understanding of precautions.      Patient instructed and indicated understanding the benefits of maintaining activity tolerance, functional mobility, and independence with self care tasks during acute stay  to ensure safe return home and to baseline. Encouraged patient to increase frequency and duration OOB, not sitting longer than 30 mins without marching/walking with staff, be out of bed for all meals, perform daily ADLs (as approved by RN/MD regarding bathing etc), and performing functional mobility to/from bathroom. Patient instruction and indicated understanding on body mechanics, ergonomics and gravitational force on the spine during different body positions to plan activities in prep for return home to complete basic ADLs, instrumental ADLs and back to work safely. Bathing: Patient instructed and indicated understanding when bathing to not submerge wound in water, stand to shower or sponge bathe, cover wound with plastic and tape to ensure no water reaches bandage/wound without cues. Dressing brace: Patient instructed and demonstrated while in front of mirror to don/doff velcro on brace using dominant side, keeping non-dominant side intact. Instruction and indicated understanding in removal of fabric pieces, placement of clean pieces, don brace, then can hand wash and allow air dry. Toileting: Patient instructed on the benefits of using flushable wet wipes and toilet tongs if decreased reach or pain for teresa care. Also, the benefits of a reacher to aid in clothing management. Therapeutic Exercise:  Patient instructed on the benefits shoulder flexion to 90*,otherwise shoulder all other planes to increase independence with ADLs, active ROM, and strength of spine. Patient demonstrated 5 reps and 2 set(s) while seated with Supervision. Patient instructed and indicated understanding how to progress reps, sets, against gravity to then working up to 5 lbs until surgeon clears for increased weight, supine to sitting and then standing. Can use household items as weights.        Activity Tolerance: Good  Please refer to the flowsheet for vital signs taken during this treatment.     After treatment patient left:   Up in chair     COMMUNICATION/COLLABORATION:   The patient?s plan of care was discussed with: Physical Therapist and Registered Nurse    Sal Grover  Time Calculation: 23 mins

## 2019-04-01 NOTE — PROGRESS NOTES
PHYSICAL THERAPY TREATMENT/ Weekly re-eval: Spine  Patient: Mela Sandoval (89 y.o. female)  Date: 4/1/2019  Diagnosis: Cervical stenosis of spine [M48.02] <principal problem not specified>  Procedure(s) (LRB):  C5-C6 C6-C7 ANTERIOR CERVICAL DISECTOMY WITH FUSION (N/A) 6 Days Post-Op  Precautions: Back, Fall(cervical brace) No bending, no lifting greater than 5 lbs, no twisting, log-roll technique, repositioning every 20-30 min except when sleeping, brace when OOB    Chart, physical therapy assessment, plan of care and goals were reviewed. ASSESSMENT:  Based on the objective data described below, the patient presents with Contact guard assistance and Minimum assistance level overall for transfers. Gait training completed 80 feet x2  using a quad cane and at the Contact guard assistance level of assistance. Will continue to follow while here in hospital to increase patient independence. Patient reporting increased pain to the posterior R side of the neck. Reviewed bed mobility and log rolling technique with patient. Recommend that patient does not use R arm to pull on the bed rail and instead uses left arm to push into a full sitting position. Ice provided after the session for pain relief. Focus on heel to toe gait pattern, widening CHRISTINA, and incorporating L arm swing. Patient continues to be a high fall risk as indicated by 15/28 on the Tinetti balance score. Patient continues to be an excellent inpatient rehab candidate and she will be able to tolerate 3 hours of therapy daily. PLAN:  Patient continues to benefit from skilled intervention 5 times per week to address the above impairments. Continue treatment per established plan of care.   Recommendations and/or planned interventions:  High level balance retraining, stair negotiation, gait training with device weaning as appropriate    Discharge recommendations: Rehab at inpatient facility: patient can tolerate 3 hours of therapy  If above is not an option then recommend: Home health (to increase independence and safety)  24 supervision    Patient's barriers to discharging home, in addition to above impairments: lives alone  history of falls. Equipment recommendations for successful discharge (if) home: none has quad cane      SUBJECTIVE:   Patient stated ? I have worse pain behind the right side of my neck today from pulling during bed mobility. ?    OBJECTIVE DATA SUMMARY:   Critical Behavior:  Neurologic State: Alert  Orientation Level: Oriented X4  Cognition: Appropriate decision making, Follows commands  Safety/Judgement: Fall prevention, Home safety, Insight into deficits, Decreased insight into deficits(learning cervical precautions but not yet consistent)    The patient stated 3/3 back precautions. Reviewed all 3 with patient. Functional Mobility Training:  Bed Mobility:  Supine to Sit: Contact guard assistance  Sit to Supine: Supervision       Transfers:  Sit to Stand: Stand-by assistance  Stand to Sit: Stand-by assistance    Bed to Chair: Contact guard assistance       Balance:  Sitting: Intact  Standing: Impaired  Standing - Static: Fair  Standing - Dynamic : Fair     Patient demonstrated donning brace with supervision/set-up. Ambulation/Gait Training:  Distance (ft): 80 Feet (ft)(x 2)  Assistive Device: Gait belt;Cane, quad  Ambulation - Level of Assistance: Contact guard assistance  Gait Abnormalities: Ataxic; Altered arm swing;Decreased step clearance; Path deviations;Trunk sway increased;Scissoring  Base of Support: Narrowed; Center of gravity altered  Speed/Vivian: Fluctuations  Step Length: Left lengthened;Right shortened    Pain:  Pre treatment: 3 /10   During treatment: 5/10  Post treatment:  5/10   Location: R posterior neck    Description:aching   Aggravating factors: bed mobility    After treatment patient left:   Supine in bed  RN notified     COMMUNICATION/COLLABORATION:   The patient?s plan of care was discussed with: Registered Nurse and     Adriana Reaves, PT, DPT  Geriatric Clinical Specialist     Time Calculation: 17 mins

## 2019-04-01 NOTE — PROGRESS NOTES
Orthopedic Spine Progress Note  Post Op day: 6 Days Post-Op    2019 7:43 AM   Admit Date: 3/26/2019  Procedure: Procedure(s):  C5-C6 C6-C7 ANTERIOR CERVICAL DISECTOMY WITH FUSION    Subjective:     Evita Baumann doing well. C/o neck soreness but pain managed. Tolerating diet. Eating breakfast this am.  No N/V. Pain Control:   Pain Assessment  Pain Scale 1: Numeric (0 - 10)  Pain Intensity 1: 4  Pain Onset 1: postop  Pain Location 1: Neck  Pain Orientation 1: Posterior  Pain Description 1: Sore  Pain Intervention(s) 1: Rest, Cold pack    Objective:          Physical Exam:  General:  Alert and oriented. No acute distress. Heart:  Respirations unlabored. Abdomen:   Extremities: Soft, non-tender. No evidence of cyanosis. Pulses palpable in both upper and lower extremities. Neurologic:  Musculoskeletal:  No new motor deficits. Neurovascular exam within normal limits. Sensation stable. Motor: unchanged C5-T1 and L2-S1. Ez's sign negative in bilateral lower extremities. Calves soft, nontender upon palpation and with passive twitch. Moves both upper and lower extremities. Incision: clean, dry, and intact. No significant erythema or swelling. No active drainage noted. Vital Signs:    Blood pressure 124/79, pulse 73, temperature 98.6 °F (37 °C), resp. rate 18, height 5' 4\" (1.626 m), weight 86.2 kg (190 lb), SpO2 96 %. Temp (24hrs), Av.6 °F (37 °C), Min:98.5 °F (36.9 °C), Max:98.8 °F (37.1 °C)      LAB:    No results for input(s): HCT, HGB, PLT, HCTEXT, HGBEXT, PLTEXT in the last 72 hours. Lab Results   Component Value Date/Time    Sodium 140 2019 12:34 PM    Potassium 4.0 2019 12:34 PM    Chloride 106 2019 12:34 PM    CO2 26 2019 12:34 PM    Glucose 261 (H) 2019 12:34 PM    BUN 12 2019 12:34 PM    Creatinine 0.92 2019 12:34 PM    Calcium 9.1 2019 12:34 PM       Intake/Output:No intake/output data recorded.   No intake/output data recorded. PT/OT:   Gait:  Gait  Base of Support: Narrowed  Speed/Vivian: Fluctuations  Step Length: Left lengthened, Right lengthened  Gait Abnormalities: Ataxic, Trunk sway increased  Ambulation - Level of Assistance: Contact guard assistance  Distance (ft): 150 Feet (ft)  Assistive Device: Cane, quad, Gait belt                 Assessment:   Patient is 6 Days Post-Op s/p Procedure(s):  C5-C6 C6-C7 ANTERIOR CERVICAL DISECTOMY WITH FUSION    Plan:     1. Continue PT/OT  2. Continue established methods of pain control  3. VTE Prophylaxes - TEDS &/or SCDs   4. Encourage ICS  5. Discharge to rehab pending  6.         Signed By: MARILYN Encinas

## 2019-04-01 NOTE — PROGRESS NOTES
Cm touched base with [facility] liaison and then with patient, informed her we are still waiting to hear from her insurance. 3:15pm: [facility] informed me that patients' insurance has denied her for acute rehab placement. Tomy notified attending and met with patient to discuss. She feels she will be okay with home health PT,OT,SN and a HHA to assist with bathing. She will have a friend pick her up at 11:00 am tomorrow. Referral sent to [facility] to see if they could accommodate.    Moshe LEVINEW, ACM

## 2019-04-02 VITALS
OXYGEN SATURATION: 95 % | WEIGHT: 190 LBS | DIASTOLIC BLOOD PRESSURE: 89 MMHG | SYSTOLIC BLOOD PRESSURE: 140 MMHG | TEMPERATURE: 98.1 F | HEART RATE: 92 BPM | RESPIRATION RATE: 18 BRPM | BODY MASS INDEX: 32.44 KG/M2 | HEIGHT: 64 IN

## 2019-04-02 LAB
GLUCOSE BLD STRIP.AUTO-MCNC: 150 MG/DL (ref 65–100)
SERVICE CMNT-IMP: ABNORMAL

## 2019-04-02 PROCEDURE — 97530 THERAPEUTIC ACTIVITIES: CPT

## 2019-04-02 PROCEDURE — 82962 GLUCOSE BLOOD TEST: CPT

## 2019-04-02 PROCEDURE — 74011636637 HC RX REV CODE- 636/637: Performed by: PHYSICIAN ASSISTANT

## 2019-04-02 PROCEDURE — 97116 GAIT TRAINING THERAPY: CPT

## 2019-04-02 PROCEDURE — 74011250637 HC RX REV CODE- 250/637: Performed by: NURSE PRACTITIONER

## 2019-04-02 PROCEDURE — 74011250637 HC RX REV CODE- 250/637: Performed by: PHYSICIAN ASSISTANT

## 2019-04-02 RX ADMIN — BENZOCAINE AND MENTHOL 1 LOZENGE: 15; 3.6 LOZENGE ORAL at 10:47

## 2019-04-02 RX ADMIN — BUPROPION HYDROCHLORIDE 150 MG: 150 TABLET, EXTENDED RELEASE ORAL at 09:16

## 2019-04-02 RX ADMIN — SERTRALINE HYDROCHLORIDE 100 MG: 50 TABLET ORAL at 09:16

## 2019-04-02 RX ADMIN — LOPERAMIDE HYDROCHLORIDE 2 MG: 2 CAPSULE ORAL at 09:21

## 2019-04-02 RX ADMIN — HYDROCHLOROTHIAZIDE 25 MG: 25 TABLET ORAL at 09:17

## 2019-04-02 RX ADMIN — BENZONATATE 200 MG: 100 CAPSULE ORAL at 10:47

## 2019-04-02 RX ADMIN — CARVEDILOL 3.12 MG: 3.12 TABLET, FILM COATED ORAL at 09:16

## 2019-04-02 RX ADMIN — ACETAMINOPHEN 1000 MG: 500 TABLET ORAL at 03:54

## 2019-04-02 RX ADMIN — INSULIN LISPRO 2 UNITS: 100 INJECTION, SOLUTION INTRAVENOUS; SUBCUTANEOUS at 07:10

## 2019-04-02 RX ADMIN — METFORMIN HYDROCHLORIDE 500 MG: 500 TABLET, EXTENDED RELEASE ORAL at 07:10

## 2019-04-02 RX ADMIN — FENOFIBRATE 145 MG: 145 TABLET ORAL at 09:16

## 2019-04-02 RX ADMIN — ACETAMINOPHEN 1000 MG: 500 TABLET ORAL at 09:16

## 2019-04-02 NOTE — PROGRESS NOTES
Orthopedic Spine Progress Note  Post Op day: 7 Days Post-Op    2019 7:57 AM   Admit Date: 3/26/2019  Procedure: Procedure(s):  C5-C6 C6-C7 ANTERIOR CERVICAL DISECTOMY WITH FUSION    Subjective:     Genella Bending sleeping peacefully. Easily awakened. Afebrile/VSS  Tolerating diet  No N/V  Voiding  +BM    Pain Control:   Pain Assessment  Pain Scale 1: Numeric (0 - 10)  Pain Intensity 1: 0(denies pain at this time)  Pain Onset 1: postop  Pain Location 1: Neck  Pain Orientation 1: Posterior, Right  Pain Description 1: Sore  Pain Intervention(s) 1: Declines    Objective:          Physical Exam:  General:  Alert and oriented. No acute distress. Heart:  Respirations unlabored. Abdomen:   Extremities: Soft, non-tender. No evidence of cyanosis. Pulses palpable in both upper and lower extremities. Neurologic:  Musculoskeletal:  No new motor deficits. Neurovascular exam within normal limits. Sensation stable. Motor: unchanged C5-T1 and L2-S1. Ez's sign negative in bilateral lower extremities. Calves soft, nontender upon palpation and with passive twitch. Moves both upper and lower extremities. Incision: clean, dry, and intact. No significant erythema or swelling. No active drainage noted. Vital Signs:    Blood pressure 135/84, pulse 72, temperature 97.8 °F (36.6 °C), resp. rate 18, height 5' 4\" (1.626 m), weight 86.2 kg (190 lb), SpO2 95 %. Temp (24hrs), Av.7 °F (37.1 °C), Min:97.4 °F (36.3 °C), Max:99.8 °F (37.7 °C)      LAB:    No results for input(s): HCT, HGB, PLT, HCTEXT, HGBEXT, PLTEXT in the last 72 hours.   Lab Results   Component Value Date/Time    Sodium 140 2019 12:34 PM    Potassium 4.0 2019 12:34 PM    Chloride 106 2019 12:34 PM    CO2 26 2019 12:34 PM    Glucose 261 (H) 2019 12:34 PM    BUN 12 2019 12:34 PM    Creatinine 0.92 2019 12:34 PM    Calcium 9.1 2019 12:34 PM       Intake/Output:No intake/output data recorded. 03/31 1901 - 04/02 0700  In: 240 [P.O.:240]  Out: -     PT/OT:   Gait:  Gait  Base of Support: Narrowed, Center of gravity altered  Speed/Vivian: Fluctuations  Step Length: Left lengthened, Right shortened  Gait Abnormalities: Ataxic, Altered arm swing, Decreased step clearance, Path deviations, Trunk sway increased, Scissoring  Ambulation - Level of Assistance: Contact guard assistance  Distance (ft): 80 Feet (ft)(x 2)  Assistive Device: Gait belt, Cane, quad                 Assessment:   Patient is 7 Days Post-Op s/p Procedure(s):  C5-C6 C6-C7 ANTERIOR CERVICAL DISECTOMY WITH FUSION    Plan:     1. Continue PT/OT  2. Continue established methods of pain control  3. VTE Prophylaxes - TEDS & SCDs   4. Encouraged use of ICS  5.   Discharge to home with home health today    Signed By: Chasity Barfield PA-C

## 2019-04-02 NOTE — PROGRESS NOTES
At [facility] has accepted for home services. No further CM needs.   Roberto Ames BSW, ACM

## 2019-04-02 NOTE — PROGRESS NOTES
Bedside and Verbal shift change report given to Shanell RN (oncoming nurse) by Kieran Montesinos RN (offgoing nurse). Report included the following information SBAR, Kardex, OR Summary, MAR and Recent Results.

## 2019-04-02 NOTE — PROGRESS NOTES
Problem: Mobility Impaired (Adult and Pediatric)  Goal: *Acute Goals and Plan of Care (Insert Text)  Description  Physical Therapy Goals  Reviewed 4/1/2019, continue to be appropriate   Initiated 3/27/2019    1. Patient will move from supine to sit and sit to supine , scoot up and down and roll side to side in bed with modified independence within 4 days. 2. Patient will perform sit to stand with modified independence within 4 days. 3. Patient will ambulate with modified independence for 150 feet with the least restrictive device within 4 days. 4. Patient will ascend/descend 13 stairs with 1 handrail(s) with modified independence within 4 days. 5. Patient will verbalize and demonstrate understanding of spinal precautions (No bending, lifting greater than 5 lbs, or twisting; log-roll technique; frequent repositioning as instructed) within 4 days. Outcome: Progressing Towards Goal   PHYSICAL THERAPY TREATMENT: Spine  Patient: Criss De (69 y.o. female)  Date: 4/2/2019  Diagnosis: Cervical stenosis of spine [M48.02] <principal problem not specified>  Procedure(s) (LRB):  C5-C6 C6-C7 ANTERIOR CERVICAL DISECTOMY WITH FUSION (N/A) 7 Days Post-Op  Precautions: Back, Fall(cervical brace) No bending, no lifting greater than 5 lbs, no twisting, log-roll technique, repositioning every 20-30 min except when sleeping, brace at all times    Chart, physical therapy assessment, plan of care and goals were reviewed. ASSESSMENT:  Based on the objective data described below, the patient presents with Modified independent level overall for transfers. Gait training completed 150 feet using a brace/splint and rolling walker and at the Supervision level of assistance. Patient is cleared from therapy stand point to discharge home with home health.  set up home care aide 2 times per week. Recommended that pt use a rolling walker due to fall risk.    The following are barriers to independence while in acute care:   -Cognitive and/or behavioral: none   -Medical condition: strength, standing balance, precautions, pain tolerance and coordination    -Other:   fall risk    Prior level of function: lives alone, overall independent but gradual decline in function, driving and working in IT        PLAN:  Patient is being discharged today. Discharge recommendations: Home health (to increase independence and safety), inpatient rehab recommended however insurance did not authorize, SNF recommended however pt declined    Patient's barriers to discharging home, in addition to above impairments: lives alone. Equipment recommendations for successful discharge (if) home: rolling walker, pt plans to use rolling walker on 1st level of her home and her quad cane on 2nd level, she has a reacher on each floor     SUBJECTIVE:   Patient stated ? I feel better with the walker. ?    OBJECTIVE DATA SUMMARY:   Critical Behavior:  Neurologic State: Alert, Appropriate for age  Orientation Level: Oriented X4  Cognition: Appropriate decision making, Appropriate for age attention/concentration, Appropriate safety awareness, Follows commands  The patient stated 3/3 precautions. Reviewed all 3 with patient. Functional Mobility Training:  Bed Mobility:        Sit to Supine: Modified independent           Transfers:  Sit to Stand: Modified independent  Stand to Sit: Modified independent                             Balance:  Sitting: Intact  Standing: Impaired  Standing - Static: Fair  Standing - Dynamic : Fair      Ambulation/Gait Training:  Distance (ft): 150 Feet (ft)  Assistive Device: Brace/Splint; Walker, rolling;Gait belt  Ambulation - Level of Assistance: Supervision        Gait Abnormalities: Ataxic;Decreased step clearance              Speed/Vivian: Slow  Step Length: Left shortened;Right shortened              Stairs:  Number of Stairs Trained: 13  Stairs - Level of Assistance: Assist X1;Supervision   Rail Use: Left x 5 steps, both x 6 steps        Pain:  Post treatment:  5/10   Location: left lower scapular region    Description:aching   Aggravating factors: at rest    Activity Tolerance:   Good  Please refer to the flowsheet for vital signs taken during this treatment.     After treatment patient left:   Supine in bed  Call light within reach     COMMUNICATION/COLLABORATION:   The patient?s plan of care was discussed with: Registered Nurse    Mandi Matos   Time Calculation: 23 mins

## 2019-04-08 NOTE — DISCHARGE SUMMARY
Procedure(s):  C5-C6 C6-C7 ANTERIOR CERVICAL DISECTOMY WITH FUSION Operative Report      Date of Surgery: 3/26/2019     Preoperative Diagnosis:  CERVICAL STENOSIS, CERVICALGIA, RADICULITIS    Postoperative Diagnosis: CERVICAL STENOSIS, CERVICALGIA, RADICULITIS    Procedure: Procedure(s):  C5-C6 C6-C7 ANTERIOR CERVICAL DISECTOMY WITH FUSION     Surgeon: Colby Foster MD    History and Hospital Course:  Anaya Granger is a pleasant 69y.o. year old female who has complaints of neck pain, falss, and loss of dexterity. Diagnostic testing found severe spondylosis at C5-6 and C6-7 with severe stenosis. There is cord compression. Having failed conservative treatment, the patient elected to undergo operative intervention. She tolerated the procedure well and was admitted post-operatively for antibiotics and pain control. On post-op day 1, the patient was doing well. She had some complaints of neck pain but Pain seems to be managed. Tolerating liquids. She complains of a sensation of shortness of breath for over 1 year. CTA 8/2018 unremarkable for pulmonary issues. + Cardiomegaly. CXR 2/2019 unremarkable. Chest CT ordered. Afebrile/VSS. No N/V. Voiding. Drain in place. She was started on a clear liquid diet. The PCA was discontinued and the patient was started on oral pain medications. She was allowed to started getting out of bed with physical therapy. IV antibiotics were continued. On post-op day 2, the patient continued to progress well. She was started on a regular diet. Pain well managed. Chest CT revealed no acute processes. Patient informed to follow-up outpatient. Continued to work with PT/OT ambulating a short distance. Referral to rehab sent. Post-op day 3-6, the patient continued to work with PT/OT. Ambulated greater distances daily. Pain managed. On post-op day 7, the patient was deemed ready for discharge. She was discharged to home with home health.   She was tolerating a regular diet and pain was well controlled with oral pain medications. The patient was discharged with prescriptions for pain medications. She was instructed to wear her brace at all times when out of bed. She was instructed to limit her bending, lifting and twisting. The patient will follow-up in 10-14 days for repeat x-rays and a wound check.       Signed By: Jacquelin Medley MD

## 2019-04-09 ENCOUNTER — OFFICE VISIT (OUTPATIENT)
Dept: INTERNAL MEDICINE CLINIC | Age: 74
End: 2019-04-09

## 2019-04-09 VITALS
BODY MASS INDEX: 31.21 KG/M2 | HEART RATE: 113 BPM | SYSTOLIC BLOOD PRESSURE: 132 MMHG | DIASTOLIC BLOOD PRESSURE: 86 MMHG | OXYGEN SATURATION: 95 % | TEMPERATURE: 99.2 F | WEIGHT: 182.8 LBS | RESPIRATION RATE: 19 BRPM | HEIGHT: 64 IN

## 2019-04-09 DIAGNOSIS — R05.9 COUGH: Primary | ICD-10-CM

## 2019-04-09 DIAGNOSIS — R06.09 DYSPNEA ON EXERTION: ICD-10-CM

## 2019-04-09 DIAGNOSIS — E66.09 CLASS 1 OBESITY DUE TO EXCESS CALORIES WITHOUT SERIOUS COMORBIDITY WITH BODY MASS INDEX (BMI) OF 33.0 TO 33.9 IN ADULT: ICD-10-CM

## 2019-04-09 DIAGNOSIS — I12.9 HYPERTENSION WITH RENAL DISEASE: ICD-10-CM

## 2019-04-09 DIAGNOSIS — J84.9 INTERSTITIAL LUNG DISEASE (HCC): ICD-10-CM

## 2019-04-09 DIAGNOSIS — M48.02 CERVICAL STENOSIS OF SPINE: ICD-10-CM

## 2019-04-09 RX ORDER — PREDNISONE 20 MG/1
20 TABLET ORAL
Qty: 30 TAB | Refills: 0 | Status: SHIPPED | OUTPATIENT
Start: 2019-04-09 | End: 2019-04-16 | Stop reason: SDUPTHER

## 2019-04-09 NOTE — PROGRESS NOTES
Chief Complaint Patient presents with  Shortness of Breath  Cough SUBJECTIVE: 
 
Columba Avelar is a 69 y.o. female who returns in follow-up complaining of cough this been present now for several months. This is a dry hacking cough associated with shortness of breath with exertion. She is also status post cervical spine surgery which was done March 26 at [facility] and she was discharged home on April 1. Because of the cough she did have a CT scan done of her lungs during hospital and she was told that it does show granuloma in the right lower lobe were told nothing else regarding the CT scan. She has noted no fevers or chills. She notes no significant sputum production. She denies any other cardiorespiratory complaints. She has noted no fevers or chills or night sweats and there are no other complaints. Current Outpatient Medications Medication Sig Dispense Refill  predniSONE (DELTASONE) 20 mg tablet Take 20 mg by mouth daily (with breakfast). 30 Tab 0  
 biotin-silicon diox-L-cysteine 3,000 mcg -100 mg-50 mg TbER Take 1 Tab by mouth daily.  glucose blood VI test strips (ACCU-CHEK CHERYL) strip Use once daily 50 Strip prn  hydroCHLOROthiazide (HYDRODIURIL) 25 mg tablet TAKE 1 TABLET EVERY DAY 90 Tab 3  
 fenofibrate nanocrystallized (TRICOR) 145 mg tablet Take 1 Tab by mouth daily. 30 Tab 11  
 oxazepam (SERAX) 15 mg capsule TAKE 2 CAPSULES AT BEDTIME 60 Cap 2  
 benzonatate (TESSALON) 200 mg capsule Take 200 mg by mouth three (3) times daily as needed for Cough.  loratadine-pseudoephedrine (CLARITIN-D 12 HOUR) 5-120 mg per tablet Take 1 Tab by mouth daily as needed.  levocarnitine HCl (ACETYL-L-CARNITINE) Take 500 mg by mouth daily.  metFORMIN ER (GLUCOPHAGE XR) 500 mg tablet Take one tablet in the morning with breakfast and one tablet in the evening with dinner. (Patient taking differently: Take 500 mg by mouth ACB/HS.  Take one tablet in the morning with breakfast and one tablet in the evening with dinner.) 270 Tab 3  
 buPROPion XL (WELLBUTRIN XL) 150 mg tablet TAKE 1 TABLET BY MOUTH EVERY DAY (Patient taking differently: Take 150 mg by mouth daily. TAKE 1 TABLET BY MOUTH EVERY DAY) 90 Tab 3  
 sertraline (ZOLOFT) 100 mg tablet TAKE 1 TABLET BY MOUTH DAILY (Patient taking differently: Take 100 mg by mouth daily. TAKE 1 TABLET BY MOUTH DAILY) 90 Tab 3  pravastatin (PRAVACHOL) 80 mg tablet Take 1 Tab by mouth nightly. 90 Tab 3  
 multivitamin (ONE A DAY) tablet Take 1 Tab by mouth daily.  azelaic acid (FINACEA) 15 % topical gel Apply  to affected area two (2) times a day.  carvedilol (COREG) 6.25 mg tablet Take 3.125 mg by mouth daily.  clindamycin (CLINDAGEL) 1 % topical gel Apply  to affected area two (2) times a day. use thin film on affected area Past Medical History:  
Diagnosis Date  Abnormal LFTs 08/24/2017 FATTY LIVER  Arthritis OSTEO  
 Avascular necrosis of hip ([facility] 08/24/2017 BILAT HIPS  Breast CA ([address] 1989, 2001 BILATERAL; SURGERY, CHEMO  Chronic pain  CKD (chronic kidney disease), stage II 8/24/2017  Coagulation disorder ([facility] 1984 ITP  (DR CHU BRADSHAW) - PLATELETS DROPPED TO 5K  Depression  Diabetes ([address] TYPE 2; NIDDM  DJD (degenerative joint disease), multiple sites 8/24/2017  GI bleed 2008 HEMORRHOIDS  Hyperlipemia  Hypertension with renal disease 8/24/2017  IBS (irritable bowel syndrome) 8/24/2017  Ill-defined condition 2015 PNEUMONIA X2 - HOSPITALIZED IN 2015  Liver disease FATTY LIVER  
 Myalgia 8/24/2017  On statin therapy 8/24/2017  Overactive bladder 8/24/2017  Pneumonia 04/2015 HOSPITALIZED 3 WEEKS.  Polymyalgia rheumatica ([address] 8/24/2017  Prophylactic antibiotic 8/24/2017  Rosacea Past Surgical History:  
Procedure [place] Date [place] [address] [address]  HX BREAST BIOPSY Bilateral   
 HX CATARACT REMOVAL Bilateral   
 HX COLONOSCOPY    
 HX ENDOSCOPY    
 HX GI    
 COLONOSCOPY  
 HX HEENT    
 WISDOM TEETH  
 HX HYSTERECTOMY  2000S  
 HX MASTECTOMY Right 1989  HX MASTECTOMY Left 2001 [place] [facility]  2008 LUMBAR FUSION  
 HX ORTHOPAEDIC  2018 RE-DO LUMBAR FUSION, AND ADDED THORACIC FUSION  
 HX ORTHOPAEDIC  03/26/2019  
 neckectomy [address]  HX UROLOGICAL  2000s BLADDER SLING  
 TOTAL HIP ARTHROPLASTY Left 11/16/2016 ANTERIOR APPROACH, DR Gwendolyn De La Torre; (POSTOP: STANDS ONE INCH TALLER ON LEFT FOOT)  TOTAL HIP ARTHROPLASTY Right 12/2016 ANTERIOR APPROACH (DR Gwendolyn De La Torre) Allergies Allergen Reactions  Statins-Hmg-Coa Reductase Inhibitors Myalgia Myalgia with  multiple statins Takes pravachol  Codeine Rash Rash on thighs  Darvon [Propoxyphene] Other (comments) \"I see worms\"  Pcn [Penicillins] Rash  Sulfa (Sulfonamide Antibiotics) Rash REVIEW OF SYSTEMS: 
General: negative for - chills or fever, or weight loss or gain ENT: negative for - headaches, nasal congestion or tinnitus Eyes: no blurred or visual changes Neck: No stiffness or swollen nodes Respiratory: negative for -  hemoptysis, shortness of breath or wheezing. Persistent dry cough with dyspnea on exertion Cardiovascular : negative for - chest pain, edema, palpitations or shortness of breath Gastrointestinal: negative for - abdominal pain, blood in stools, heartburn or nausea/vomiting Genito-Urinary: no dysuria, trouble voiding, or hematuria Musculoskeletal: negative for - gait disturbance, joint pain, joint stiffness or joint swelling Neurological: no TIA or stroke symptoms Hematologic: no bruises, no bleeding Lymphatic: no swollen glands Integument: no lumps, mole changes, nail changes or rash Endocrine:no malaise/lethargy poly uria or polydipsia or unexpected weight changes Social History Socioeconomic History  Marital status:  Spouse name: Not on file  Number of children: Not on file  Years of education: Not on file  Highest education level: Not on file Tobacco Use  Smoking status: Never Smoker  Smokeless tobacco: Never Used Substance and Sexual Activity  Alcohol use: No  
 Drug use: No  
 Sexual activity: Never Family History Problem Relation Age of Onset  Heart Disease Mother  Kidney Disease Mother  Lung Disease Mother COPD  Diabetes Brother  Pacemaker Brother  Kidney Disease Brother  Hypertension Brother  Anesth Problems Neg Hx OBJECTIVE:  
 
Visit Vitals /86 (BP 1 Location: Right arm, BP Patient Position: Sitting) Pulse (!) 113 Temp 99.2 °F (37.3 °C) (Oral) Resp 19 Ht 5' 4\" (1.626 m) Wt 182 lb 12.8 oz (82.9 kg) SpO2 95% BMI 31.38 kg/m² CONSTITUTIONAL:   well nourished, appears age appropriate EYES: sclera anicteric, PERRL, EOMI 
ENMT:nares clear, moist mucous membranes, pharynx clear NECK: supple. Thyroid normal, No JVD or bruits RESPIRATORY: Chest: clear to ascultation and percussion except dry bibasilar rales, normal inspiratory effort CARDIOVASCULAR: Heart: regular rate and rhythm no murmurs, rubs or gallops, PMI not displaced, No thrills GASTROINTESTINAL: Abdomen: non distended, soft, non tender, bowel sounds normal 
HEMATOLOGIC: no purpura, petechiae or bruising LYMPHATIC: No lymph node enlargemant MUSCULOSKELETAL: Extremities: no edema or active synovitis, pulse 1+ INTEGUMENT: No unusual rashes or suspicious skin lesions noted. Nails appear normal. 
PERIPHERAL VASCULAR: normal pulses femoral, PT and DP NEUROLOGIC: non-focal exam, A & O X 3 PSYCHIATRIC:, appropriate affect ASSESSMENT:  
1. Cough 2. Interstitial lung disease ([address] 3. Dyspnea on exertion 4. Class 1 obesity due to excess calories without serious comorbidity with body mass index (BMI) of 33.0 to 33.9 in adult 5. Hypertension with renal disease 6. Cervical stenosis of spine Impression 1. Cough and I reviewed her chest CT scan done at [facility] which revealed evidence of interstitial lung disease felt to be mild disease based upon findings. Based upon this I will placed on prednisone 20 mg a day. I told her she needs to see a pulmonary specialist and she actually already has an appointment to see Dr. Ebony English on 15 May. 2.  Dyspnea on exertion related to #1 3. Obesity we did discuss diet exercise and weight reduction 4. Hypertension that is controlled 5. Cervical stenosis status post surgery doing well postop 6. Interstitial lung disease as detailed above I will recheck her again myself in 1 week to see how she is doing with the prednisone 20 mg daily. PLAN: 
. Orders Placed This Encounter  predniSONE (DELTASONE) 20 mg tablet ATTENTION:  
This medical record was transcribed using an electronic medical records system. Although proofread, it may and can contain electronic and spelling errors. Other human spelling and other errors may be present. Corrections may be executed at a later time. Please feel free to contact us for any clarifications as needed. No results found for any visits on 04/09/19. Francisco Whitlock MD 
 
The patient verbalized understanding of the problems and plans as explained.

## 2019-04-09 NOTE — PATIENT INSTRUCTIONS
Interstitial Lung Disease: Care Instructions Your Care Instructions Interstitial lung disease is a long-term (chronic) lung disease. It happens because of damage between the air sacs in the lung. The damage scars the lung and causes breathing problems. People with interstitial lung disease get breathless during exercise and may have a dry cough. These problems may get worse slowly or very quickly. Interstitial lung disease can be caused by breathing in dust from asbestos and silica. It also can be caused by infections and some medicines. Sometimes doctors cannot find the cause. You may get medicine to treat the problem. Corticosteroids can sometimes reduce the swelling of lung tissue and prevent more damage. Oxygen treatment may help your condition. Follow-up care is a key part of your treatment and safety. Be sure to make and go to all appointments, and call your doctor if you are having problems. It's also a good idea to know your test results and keep a list of the medicines you take. How can you care for yourself at home? · Do not smoke. Smoking makes interstitial lung disease worse. If you need help quitting, talk to your doctor about stop-smoking programs and medicines. These can increase your chances of quitting for good. · Take your medicines exactly as prescribed. Call your doctor if you have any problems with your medicine. · Get flu and pneumococcal shots. These help prevent lung infection. · Make an exercise plan with help from your doctor or other health professional. Exercise can help you breathe more easily. · Think about joining a support group. This can help you cope with problems caused by interstitial lung disease. When should you call for help?  
Call your doctor now or seek immediate medical care if: 
  · Your shortness of breath gets worse.  
  · You cough up blood.  
  · You have severe chest pain.  
 Watch closely for changes in your health, and be sure to contact your doctor if you have any problems. Where can you learn more? Go to http://malia-lincoln.info/. Enter J446 in the search box to learn more about \"Interstitial Lung Disease: Care Instructions. \" Current as of: September 5, 2018 Content Version: 11.9 © 5387-6868 [employer]. Care instructions adapted under license by [employer] (which disclaims liability or warranty for this information). If you have questions about a medical condition or this instruction, always ask your healthcare professional. [address] any warranty or liability for your use of this information.

## 2019-04-09 NOTE — PROGRESS NOTES
Chief Complaint Patient presents with  Shortness of Breath  Cough Visit Vitals /86 (BP 1 Location: Right arm, BP Patient Position: Sitting) Pulse (!) 113 Temp 99.2 °F (37.3 °C) (Oral) Resp 19 Ht 5' 4\" (1.626 m) Wt 182 lb 12.8 oz (82.9 kg) SpO2 95% BMI 31.38 kg/m² 1. Have you been to the ER, urgent care clinic since your last visit? Hospitalized since your last visit?no 2. Have you seen or consulted any other health care providers outside of the [address] [place] [address] since your last visit? Include any pap smears or colon screening. Dr. Francoise Villa

## 2019-04-15 NOTE — PROGRESS NOTES
Chief Complaint Patient presents with  Hypertension 3 month follow up  Chronic Kidney Disease  Cholesterol Problem SUBJECTIVE: 
 
Janel Hernadez is a 69 y.o. female who returns in follow-up for her medical problems include hypertension, diabetes, hyperlipidemia, allergic rhinitis, interstitial lung disease, depression and other medical problems. She is taking her medications and trying to follow her diet and trying to get some exercise. She has been limited a little by her recent neck surgery. She currently denies any chest pain, shortness of breath, palpitations, PND, orthopnea or other cardiorespiratory complaints except for the cough although that is better with the prednisone at 20 mg a day although that is increasing her appetite. She notes no GI or  complaints. She denies any headaches, dizziness or neurologic complaints. She has no other complaints on complete review of systems. Current Outpatient Medications Medication Sig Dispense Refill  predniSONE (DELTASONE) 20 mg tablet Take 20 mg by mouth daily (with breakfast). 30 Tab 0  
 biotin-silicon diox-L-cysteine 3,000 mcg -100 mg-50 mg TbER Take 1 Tab by mouth daily.  glucose blood VI test strips (ACCU-CHEK CHERYL) strip Use once daily 50 Strip prn  hydroCHLOROthiazide (HYDRODIURIL) 25 mg tablet TAKE 1 TABLET EVERY DAY 90 Tab 3  
 oxazepam (SERAX) 15 mg capsule TAKE 2 CAPSULES AT BEDTIME 60 Cap 2  
 benzonatate (TESSALON) 200 mg capsule Take 200 mg by mouth three (3) times daily as needed for Cough.  loratadine-pseudoephedrine (CLARITIN-D 12 HOUR) 5-120 mg per tablet Take 1 Tab by mouth daily as needed.  levocarnitine HCl (ACETYL-L-CARNITINE) Take 500 mg by mouth daily.  metFORMIN ER (GLUCOPHAGE XR) 500 mg tablet Take one tablet in the morning with breakfast and one tablet in the evening with dinner. (Patient taking differently: Take 500 mg by mouth ACB/HS.  Take one tablet in the morning with breakfast and one tablet in the evening with dinner.) 270 Tab 3  
 buPROPion XL (WELLBUTRIN XL) 150 mg tablet TAKE 1 TABLET BY MOUTH EVERY DAY (Patient taking differently: Take 150 mg by mouth daily. TAKE 1 TABLET BY MOUTH EVERY DAY) 90 Tab 3  
 sertraline (ZOLOFT) 100 mg tablet TAKE 1 TABLET BY MOUTH DAILY (Patient taking differently: Take 100 mg by mouth daily. TAKE 1 TABLET BY MOUTH DAILY) 90 Tab 3  pravastatin (PRAVACHOL) 80 mg tablet Take 1 Tab by mouth nightly. 90 Tab 3  
 multivitamin (ONE A DAY) tablet Take 1 Tab by mouth daily.  azelaic acid (FINACEA) 15 % topical gel Apply  to affected area two (2) times a day.  carvedilol (COREG) 6.25 mg tablet Take 3.125 mg by mouth daily.  clindamycin (CLINDAGEL) 1 % topical gel Apply  to affected area two (2) times a day. use thin film on affected area  fenofibrate nanocrystallized (TRICOR) 145 mg tablet Take 1 Tab by mouth daily. 30 Tab 11 Past Medical History:  
Diagnosis Date  Abnormal LFTs 08/24/2017 FATTY LIVER  Arthritis OSTEO  
 Avascular necrosis of hip ([address] 08/24/2017 BILAT HIPS  Breast CA ([address] 1989, 2001 BILATERAL; SURGERY, CHEMO  Chronic pain  CKD (chronic kidney disease), stage II 8/24/2017  Coagulation disorder ([address] 1984 ITP  (DR CHU BRADSHAW) - PLATELETS DROPPED TO 5K  Depression  Diabetes ([address] TYPE 2; NIDDM  DJD (degenerative joint disease), multiple sites 8/24/2017  GI bleed 2008 HEMORRHOIDS  Hyperlipemia  Hypertension with renal disease 8/24/2017  IBS (irritable bowel syndrome) 8/24/2017  Ill-defined condition 2015 PNEUMONIA X2 - HOSPITALIZED IN 2015  Liver disease FATTY LIVER  
 Myalgia 8/24/2017  On statin therapy 8/24/2017  Overactive bladder 8/24/2017  Pneumonia 04/2015 HOSPITALIZED 3 WEEKS.  Polymyalgia rheumatica ([address] 8/24/2017  Prophylactic antibiotic 8/24/2017  Rosacea Past Surgical History: Procedure Laterality Date [facility] [place], 2002 [address]  HX BREAST BIOPSY Bilateral   
 HX CATARACT REMOVAL Bilateral   
 HX COLONOSCOPY    
 HX ENDOSCOPY    
 HX GI    
 COLONOSCOPY  
 HX HEENT    
 WISDOM TEETH  
 HX HYSTERECTOMY  2000S  
 HX MASTECTOMY Right 1989  HX MASTECTOMY Left 2001 [facility]  2008 LUMBAR FUSION  
 HX ORTHOPAEDIC  2018 RE-DO LUMBAR FUSION, AND ADDED THORACIC FUSION  
 HX ORTHOPAEDIC  03/26/2019  
 neckectomy [address]  HX UROLOGICAL  2000s BLADDER SLING  
 TOTAL HIP ARTHROPLASTY Left 11/16/2016 ANTERIOR APPROACH, DR Gwendolyn De La Torre; (POSTOP: STANDS ONE INCH TALLER ON LEFT FOOT)  TOTAL HIP ARTHROPLASTY Right 12/2016 ANTERIOR APPROACH (DR Gwendolyn De La Torre) Allergies Allergen Reactions  Statins-Hmg-Coa Reductase Inhibitors Myalgia Myalgia with  multiple statins Takes pravachol  Codeine Rash Rash on thighs  Darvon [Propoxyphene] Other (comments) \"I see worms\"  Pcn [Penicillins] Rash  Sulfa (Sulfonamide Antibiotics) Rash REVIEW OF SYSTEMS: 
General: negative for - chills or fever, or weight loss or gain ENT: negative for - headaches, nasal congestion or tinnitus Eyes: no blurred or visual changes Neck: No stiffness or swollen nodes Respiratory: negative for -  hemoptysis, shortness of breath or wheezing. Positive for cough Cardiovascular : negative for - chest pain, edema, palpitations or shortness of breath Gastrointestinal: negative for - abdominal pain, blood in stools, heartburn or nausea/vomiting Genito-Urinary: no dysuria, trouble voiding, or hematuria Musculoskeletal: negative for - gait disturbance, joint pain, joint stiffness or joint swelling Neurological: no TIA or stroke symptoms Hematologic: no bruises, no bleeding Lymphatic: no swollen glands Integument: no lumps, mole changes, nail changes or rash Endocrine:no malaise/lethargy poly uria or polydipsia or unexpected weight changes Social History Socioeconomic History  Marital status:  Spouse name: Not on file  Number of children: Not on file  Years of education: Not on file  Highest education level: Not on file Tobacco Use  Smoking status: Never Smoker  Smokeless tobacco: Never Used Substance and Sexual Activity  Alcohol use: No  
 Drug use: No  
 Sexual activity: Never Family History Problem Relation Age of Onset  Heart Disease Mother  Kidney Disease Mother  Lung Disease Mother COPD  Diabetes Brother  Pacemaker Brother  Kidney Disease Brother  Hypertension Brother  Anesth Problems Neg Hx OBJECTIVE:  
 
Visit Vitals /88 (BP 1 Location: Left arm, BP Patient Position: Sitting) Pulse 95 Temp 98.8 °F (37.1 °C) (Oral) Resp 20 Ht 5' 4\" (1.626 m) Wt 184 lb 12.8 oz (83.8 kg) SpO2 98% BMI 31.72 kg/m² CONSTITUTIONAL:   well nourished, appears age appropriate EYES: sclera anicteric, PERRL, EOMI 
ENMT:nares clear, moist mucous membranes, pharynx clear NECK: supple. Thyroid normal, No JVD or bruits RESPIRATORY: Chest: clear to ascultation and percussion, normal inspiratory effort CARDIOVASCULAR: Heart: regular rate and rhythm no murmurs, rubs or gallops, PMI not displaced, No thrills GASTROINTESTINAL: Abdomen: non distended, soft, non tender, bowel sounds normal 
HEMATOLOGIC: no purpura, petechiae or bruising LYMPHATIC: No lymph node enlargemant MUSCULOSKELETAL: Extremities: no edema or active synovitis, pulse 1+ INTEGUMENT: No unusual rashes or suspicious skin lesions noted. Nails appear normal. 
PERIPHERAL VASCULAR: normal pulses femoral, PT and DP NEUROLOGIC: non-focal exam, A & O X 3 PSYCHIATRIC:, appropriate affect ASSESSMENT:  
1. Hypertension with renal disease 2. Controlled type 2 diabetes mellitus with stage 2 chronic kidney disease, with long-term current use of insulin ([facility] 3. Mixed hyperlipidemia 4. Primary osteoarthritis involving multiple joints 5. CKD (chronic kidney disease), stage II 6. Class 1 obesity due to excess calories without serious comorbidity with body mass index (BMI) of 33.0 to 33.9 in adult 7. Interstitial lung disease ([address] Impression 1. Hypertension is controlled to continue current therapy reviewed with her. 2.  Diabetes repeat status pending and prior lab reviewed and no make adjustments if necessary. 3.  Hyperlipidemia prior lab reviewed repeat status pending and I will adjust if needed. 4. DJD that is stable 5. CKD stage II repeat status pending 6. Obesity we did discuss diet, exercise and weight reduction for overall health benefit. 7.  Interstitial lung disease she does have a pulmonary appointment but not until May 15. At this point she has improved with prednisone 20 so I will decrease it to 10 mg and see if her side effects will be less. I will recheck in 2 weeks regarding her interstitial lung disease in 3 months regarding other medical problems. I will call the lab results. PLAN: 
. Orders Placed This Encounter  HEMOGLOBIN A1C WITH EAG  
 METABOLIC PANEL, COMPREHENSIVE ([facility])  LIPID PANEL ([facility])  CK ([facility])  predniSONE (DELTASONE) 20 mg tablet ATTENTION:  
This medical record was transcribed using an electronic medical records system. Although proofread, it may and can contain electronic and spelling errors. Other human spelling and other errors may be present. Corrections may be executed at a later time. Please feel free to contact us for any clarifications as needed. Follow-up and Dispositions · Return in about 3 months (around 7/16/2019). No results found for any visits on 04/16/19. Dejon Regan MD 
 
 The patient verbalized understanding of the problems and plans as explained.

## 2019-04-16 ENCOUNTER — OFFICE VISIT (OUTPATIENT)
Dept: INTERNAL MEDICINE CLINIC | Age: 74
End: 2019-04-16

## 2019-04-16 VITALS
BODY MASS INDEX: 31.55 KG/M2 | HEIGHT: 64 IN | HEART RATE: 95 BPM | RESPIRATION RATE: 20 BRPM | SYSTOLIC BLOOD PRESSURE: 130 MMHG | TEMPERATURE: 98.8 F | WEIGHT: 184.8 LBS | DIASTOLIC BLOOD PRESSURE: 88 MMHG | OXYGEN SATURATION: 98 %

## 2019-04-16 DIAGNOSIS — N18.2 CKD (CHRONIC KIDNEY DISEASE), STAGE II: ICD-10-CM

## 2019-04-16 DIAGNOSIS — E78.2 MIXED HYPERLIPIDEMIA: ICD-10-CM

## 2019-04-16 DIAGNOSIS — E66.09 CLASS 1 OBESITY DUE TO EXCESS CALORIES WITHOUT SERIOUS COMORBIDITY WITH BODY MASS INDEX (BMI) OF 33.0 TO 33.9 IN ADULT: ICD-10-CM

## 2019-04-16 DIAGNOSIS — Z79.4 CONTROLLED TYPE 2 DIABETES MELLITUS WITH STAGE 2 CHRONIC KIDNEY DISEASE, WITH LONG-TERM CURRENT USE OF INSULIN (HCC): ICD-10-CM

## 2019-04-16 DIAGNOSIS — I12.9 HYPERTENSION WITH RENAL DISEASE: Primary | ICD-10-CM

## 2019-04-16 DIAGNOSIS — E11.22 CONTROLLED TYPE 2 DIABETES MELLITUS WITH STAGE 2 CHRONIC KIDNEY DISEASE, WITH LONG-TERM CURRENT USE OF INSULIN (HCC): ICD-10-CM

## 2019-04-16 DIAGNOSIS — J84.9 INTERSTITIAL LUNG DISEASE (HCC): ICD-10-CM

## 2019-04-16 DIAGNOSIS — M15.9 PRIMARY OSTEOARTHRITIS INVOLVING MULTIPLE JOINTS: ICD-10-CM

## 2019-04-16 DIAGNOSIS — N18.2 CONTROLLED TYPE 2 DIABETES MELLITUS WITH STAGE 2 CHRONIC KIDNEY DISEASE, WITH LONG-TERM CURRENT USE OF INSULIN (HCC): ICD-10-CM

## 2019-04-16 LAB
A-G RATIO,AGRAT: 1.6 RATIO
ALBUMIN SERPL-MCNC: 4.6 G/DL (ref 3.9–5.4)
ALP SERPL-CCNC: 86 U/L (ref 38–126)
ALT SERPL-CCNC: 67 U/L (ref 9–52)
ANION GAP SERPL CALC-SCNC: 17 MMOL/L
AST SERPL W P-5'-P-CCNC: 65 U/L (ref 14–36)
BILIRUB SERPL-MCNC: 0.4 MG/DL (ref 0.2–1.3)
BUN SERPL-MCNC: 27 MG/DL (ref 7–17)
BUN/CREATININE RATIO,BUCR: 39 RATIO
CALCIUM SERPL-MCNC: 10.4 MG/DL (ref 8.4–10.2)
CHLORIDE SERPL-SCNC: 105 MMOL/L (ref 98–107)
CHOL/HDL RATIO,CHHD: 3 RATIO (ref 0–4)
CHOLEST SERPL-MCNC: 183 MG/DL (ref 0–200)
CK SERPL-CCNC: 35 U/L (ref 30–135)
CO2 SERPL-SCNC: 27 MMOL/L (ref 22–32)
CREAT SERPL-MCNC: 0.7 MG/DL (ref 0.7–1.2)
GLOBULIN,GLOB: 2.9
GLUCOSE SERPL-MCNC: 168 MG/DL (ref 65–105)
HDLC SERPL-MCNC: 65 MG/DL (ref 35–130)
LDL/HDL RATIO,LDHD: 1 RATIO
LDLC SERPL CALC-MCNC: 76 MG/DL (ref 0–130)
POTASSIUM SERPL-SCNC: 4.6 MMOL/L (ref 3.6–5)
PROT SERPL-MCNC: 7.5 G/DL (ref 6.3–8.2)
SODIUM SERPL-SCNC: 149 MMOL/L (ref 137–145)
TRIGL SERPL-MCNC: 211 MG/DL (ref 0–200)
VLDLC SERPL CALC-MCNC: 42 MG/DL

## 2019-04-16 RX ORDER — PREDNISONE 20 MG/1
10 TABLET ORAL
Qty: 30 TAB | Refills: 0 | Status: ON HOLD | OUTPATIENT
Start: 2019-04-16 | End: 2019-05-03 | Stop reason: SDUPTHER

## 2019-04-16 NOTE — PATIENT INSTRUCTIONS
Arthritis: Care Instructions Your Care Instructions Arthritis, also called osteoarthritis, is a breakdown of the cartilage that cushions your joints. When the cartilage wears down, your bones rub against each other. This causes pain and stiffness. Many people have some arthritis as they age. Arthritis most often affects the joints of the spine, hands, hips, knees, or feet. You can take simple measures to protect your joints, ease your pain, and help you stay active. Follow-up care is a key part of your treatment and safety. Be sure to make and go to all appointments, and call your doctor if you are having problems. It's also a good idea to know your test results and keep a list of the medicines you take. How can you care for yourself at home? · Stay at a healthy weight. Being overweight puts extra strain on your joints. · Talk to your doctor or physical therapist about exercises that will help ease joint pain. ? Stretch. You may enjoy gentle forms of yoga to help keep your joints and muscles flexible. ? Walk instead of jog. Other types of exercise that are less stressful on the joints include riding a bicycle, swimming, bebo chi, or water exercise. ? Lift weights. Strong muscles help reduce stress on your joints. Stronger thigh muscles, for example, take some of the stress off of the knees and hips. Learn the right way to lift weights so you do not make joint pain worse. · Take your medicines exactly as prescribed. Call your doctor if you think you are having a problem with your medicine. · Take pain medicines exactly as directed. ? If the doctor gave you a prescription medicine for pain, take it as prescribed. ? If you are not taking a prescription pain medicine, ask your doctor if you can take an over-the-counter medicine. · Use a cane, crutch, walker, or another device if you need help to get around. These can help rest your joints.  You also can use other things to make life easier, such as a higher toilet seat and padded handles on kitchen utensils. · Do not sit in low chairs, which can make it hard to get up. · Put heat or cold on your sore joints as needed. Use whichever helps you most. You also can take turns with hot and cold packs. ? Apply heat 2 or 3 times a day for 20 to 30 minutesusing a heating pad, hot shower, or hot packto relieve pain and stiffness. ? Put ice or a cold pack on your sore joint for 10 to 20 minutes at a time. Put a thin cloth between the ice and your skin. When should you call for help? Call your doctor now or seek immediate medical care if: 
  · You have sudden swelling, warmth, or pain in any joint.  
  · You have joint pain and a fever or rash.  
  · You have such bad pain that you cannot use a joint.  
 Watch closely for changes in your health, and be sure to contact your doctor if: 
  · You have mild joint symptoms that continue even with more than 6 weeks of care at home.  
  · You have stomach pain or other problems with your medicine. Where can you learn more? Go to http://malia-lincoln.info/. Enter D856 in the search box to learn more about \"Arthritis: Care Instructions. \" Current as of: Etelvina 10, 2018 Content Version: 11.9 © 3289-8077 [employer]. Care instructions adapted under license by [employer] (which disclaims liability or warranty for this information). If you have questions about a medical condition or this instruction, always ask your healthcare professional. [place] [address] any warranty or liability for your use of this information.

## 2019-04-16 NOTE — PROGRESS NOTES
Chief Complaint Patient presents with  Hypertension 3 month follow up  Chronic Kidney Disease  Cholesterol Problem Visit Vitals /88 (BP 1 Location: Left arm, BP Patient Position: Sitting) Pulse 95 Temp 98.8 °F (37.1 °C) (Oral) Resp 20 Ht 5' 4\" (1.626 m) Wt 184 lb 12.8 oz (83.8 kg) SpO2 98% BMI 31.72 kg/m² 1. Have you been to the ER, urgent care clinic since your last visit? Hospitalized since your last visit? No 
 
2. Have you seen or consulted any other health care providers outside of the [address] [place] [address] since your last visit? Include any pap smears or colon screening. 4/11/19 Dr. Blake Rice

## 2019-04-17 LAB
EST. AVERAGE GLUCOSE BLD GHB EST-MCNC: 166 MG/DL
HBA1C MFR BLD: 7.4 % (ref 4.8–5.6)

## 2019-04-17 RX ORDER — METFORMIN HYDROCHLORIDE 500 MG/1
TABLET, EXTENDED RELEASE ORAL
Qty: 270 TAB | Refills: 3 | Status: SHIPPED | OUTPATIENT
Start: 2019-04-17 | End: 2019-06-07 | Stop reason: SDUPTHER

## 2019-04-17 NOTE — PROGRESS NOTES
Discussed lab with patient. Advised to increase the Metformin per order by Dr. Rahul Heller. Rx sent to patient's pharmacy. Advised patient to be aware that prednisone may also contribute to the elevated blood sugar as well and she needed to work on her diet and exercise.

## 2019-04-17 NOTE — PROGRESS NOTES
Labs are okay except elevated blood sugar, elevated triglycerides and elevated glycohemoglobin. The glycohemoglobin elevation suggest to me that this is been going on even before the prednisone was started so I would increase the metformin to go to 500 mg in the morning and 1000 mg in the evening. Watch diet and I am aware that prednisone can increase the blood sugar so we will have to keep an eye on that.

## 2019-04-17 NOTE — TELEPHONE ENCOUNTER
RX refill request from the patient/pharmacy. Patient last seen 04- with labs, and next appt. scheduled for 04-  Requested Prescriptions     Pending Prescriptions Disp Refills    metFORMIN ER (GLUCOPHAGE XR) 500 mg tablet 270 Tab 3     Sig: Take one tablet in the morning with breakfast and two tablets in the evening with dinner. Teto Barkley

## 2019-04-24 RX ORDER — BENZONATATE 200 MG/1
CAPSULE ORAL
Qty: 30 CAP | Refills: 0 | Status: SHIPPED | OUTPATIENT
Start: 2019-04-24 | End: 2019-04-30 | Stop reason: SDUPTHER

## 2019-04-24 RX ORDER — CARVEDILOL 6.25 MG/1
3.12 TABLET ORAL DAILY
Qty: 45 TAB | Refills: 5 | Status: SHIPPED | OUTPATIENT
Start: 2019-04-24 | End: 2019-07-24 | Stop reason: ALTCHOICE

## 2019-04-24 NOTE — TELEPHONE ENCOUNTER
RX refill request from the patient/pharmacy. Patient last seen 04- with labs, and next appt. scheduled for 04-  Requested Prescriptions     Pending Prescriptions Disp Refills    carvedilol (COREG) 6.25 mg tablet 45 Tab 5     Sig: Take 0.5 Tabs by mouth daily. [place]

## 2019-04-24 NOTE — TELEPHONE ENCOUNTER
RX refill request from the patient/pharmacy. Patient last seen 04- with labs, and next appt. scheduled for 04-  Requested Prescriptions     Pending Prescriptions Disp Refills    benzonatate (TESSALON) 200 mg capsule [Pharmacy Med Name: BENZONATATE 200MG] 30 Cap 0     Sig: TAKE 1 CAPSULE THREE TIMES DAILY IF NEEDED FOR COUGH   .

## 2019-04-29 NOTE — PROGRESS NOTES
Chief Complaint   Patient presents with    Hypertension     2 week follow up    Chronic Kidney Disease    Irritable Bowel Syndrome    Diabetes       SUBJECTIVE:    Raul Tao is a 69 y.o. female who returns today with increasing cough and congestion that is getting worse on a daily basis. She was seen by me on April 9 after being hospitalized at [facility] for cervical spine surgery. While there she was having some problems with shortness of breath and had a CT scan which was read as interstitial lung disease. When I saw her on the ninth she was having a significant amount of coughing and shortness of breath and I placed on prednisone and followed up which time she seemed to be getting a little better but she seems to be getting worse now. She is also developed little low-grade fever over the past couple days. She does note a heaviness in her chest is constant but that she notes each time she takes a deep breath or tries to breathe at all. Current Outpatient Medications   Medication Sig Dispense Refill    benzonatate (TESSALON) 200 mg capsule TAKE 1 CAPSULE THREE TIMES DAILY IF NEEDED FOR COUGH 30 Cap 0    cholecalciferol (VITAMIN D3) 1,000 unit cap Take  by mouth daily. Indications: unknown of the mg.  carvedilol (COREG) 6.25 mg tablet Take 0.5 Tabs by mouth daily. 45 Tab 5    metFORMIN ER (GLUCOPHAGE XR) 500 mg tablet Take one tablet in the morning with breakfast and two tablets in the evening with dinner. 270 Tab 3    predniSONE (DELTASONE) 20 mg tablet Take 20 mg by mouth daily (with breakfast). (Patient taking differently: Take 10 mg by mouth daily (with breakfast). Indications: 10mg) 30 Tab 0    biotin-silicon diox-L-cysteine 3,000 mcg -100 mg-50 mg TbER Take 1 Tab by mouth daily.       glucose blood VI test strips (ACCU-CHEK CHERYL) strip Use once daily 50 Strip prn    hydroCHLOROthiazide (HYDRODIURIL) 25 mg tablet TAKE 1 TABLET EVERY DAY 90 Tab 3    fenofibrate nanocrystallized (TRICOR) 145 mg tablet Take 1 Tab by mouth daily. 30 Tab 11    oxazepam (SERAX) 15 mg capsule TAKE 2 CAPSULES AT BEDTIME 60 Cap 2    loratadine-pseudoephedrine (CLARITIN-D 12 HOUR) 5-120 mg per tablet Take 1 Tab by mouth daily as needed.  levocarnitine HCl (ACETYL-L-CARNITINE) Take 500 mg by mouth daily.  buPROPion XL (WELLBUTRIN XL) 150 mg tablet TAKE 1 TABLET BY MOUTH EVERY DAY (Patient taking differently: Take 150 mg by mouth daily. TAKE 1 TABLET BY MOUTH EVERY DAY) 90 Tab 3    sertraline (ZOLOFT) 100 mg tablet TAKE 1 TABLET BY MOUTH DAILY (Patient taking differently: Take 100 mg by mouth daily. TAKE 1 TABLET BY MOUTH DAILY) 90 Tab 3    pravastatin (PRAVACHOL) 80 mg tablet Take 1 Tab by mouth nightly. 90 Tab 3    multivitamin (ONE A DAY) tablet Take 1 Tab by mouth daily.  azelaic acid (FINACEA) 15 % topical gel Apply  to affected area two (2) times a day.  clindamycin (CLINDAGEL) 1 % topical gel Apply  to affected area two (2) times a day.  use thin film on affected area       Past Medical History:   Diagnosis Date    Abnormal LFTs 08/24/2017    FATTY LIVER    Arthritis     OSTEO    Avascular necrosis of hip ([facility] [address] 08/24/2017    BILAT HIPS    Breast CA ([facility] [address] 1989, 2001    BILATERAL; SURGERY, CHEMO    Chronic pain     CKD (chronic kidney disease), stage II 8/24/2017    Coagulation disorder ([facility] [address] 1984    ITP  (DR CHU BRADSHAW) - PLATELETS DROPPED TO 5K    Depression     Diabetes ([facility] [address]     TYPE 2; NIDDM    DJD (degenerative joint disease), multiple sites 8/24/2017    GI bleed 2008    HEMORRHOIDS    Hyperlipemia     Hypertension with renal disease 8/24/2017    IBS (irritable bowel syndrome) 8/24/2017    Ill-defined condition 2015    PNEUMONIA X2 - HOSPITALIZED IN 2015    Interstitial lung disease ([facility] [address] 04/01/2019    Liver disease     FATTY LIVER    Myalgia 8/24/2017    On statin therapy 8/24/2017    Overactive bladder 8/24/2017    Pneumonia 04/2015 HOSPITALIZED 3 WEEKS.  Polymyalgia rheumatica ([facility] [address] 8/24/2017    Prophylactic antibiotic 8/24/2017    Rosacea      Past Surgical History:   Procedure Laterality Date    BREAST SURGERY PROCEDURE UNLISTED  1993, 2002    RECONSTRUCTION X2    HX APPENDECTOMY  1950    HX BREAST BIOPSY Bilateral     HX CATARACT REMOVAL Bilateral     HX COLONOSCOPY      HX ENDOSCOPY      HX GI      COLONOSCOPY    HX HEENT      WISDOM TEETH    HX HYSTERECTOMY  2000S    HX MASTECTOMY Right 1989    HX MASTECTOMY Left 2001    HX ORTHOPAEDIC  2008    LUMBAR FUSION    HX ORTHOPAEDIC  2018    RE-DO LUMBAR FUSION, AND ADDED THORACIC FUSION    HX ORTHOPAEDIC  03/26/2019    neckectomy    HX TUBAL LIGATION  1981    HX UROLOGICAL  2000s    BLADDER SLING    TOTAL HIP ARTHROPLASTY Left 11/16/2016    ANTERIOR APPROACH, DR Gwendolyn De La Torre; (POSTOP: STANDS ONE INCH TALLER ON LEFT FOOT)    TOTAL HIP ARTHROPLASTY Right 12/2016    ANTERIOR APPROACH (DR JAIME)     Allergies   Allergen Reactions    Statins-Hmg-Coa Reductase Inhibitors Myalgia     Myalgia with  multiple statins  Takes pravachol     Codeine Rash     Rash on thighs    Darvon [Propoxyphene] Other (comments)     \"I see worms\"    Pcn [Penicillins] Rash    Sulfa (Sulfonamide Antibiotics) Rash       REVIEW OF SYSTEMS:  General: negative for - chills, or weight loss or gain positive for fever  ENT: negative for - headaches, nasal congestion or tinnitus  Eyes: no blurred or visual changes  Neck: No stiffness or swollen nodes  Respiratory: negative for - cough, hemoptysis, shortness of breath or wheezing.   Positive for heaviness in her chest  Cardiovascular : negative for - chest pain, edema, palpitations or shortness of breath  Gastrointestinal: negative for - abdominal pain, blood in stools, heartburn or nausea/vomiting  Genito-Urinary: no dysuria, trouble voiding, or hematuria  Musculoskeletal: negative for - gait disturbance, joint pain, joint stiffness or joint swelling  Neurological: no TIA or stroke symptoms  Hematologic: no bruises, no bleeding  Lymphatic: no swollen glands  Integument: no lumps, mole changes, nail changes or rash  Endocrine:no malaise/lethargy poly uria or polydipsia or unexpected weight changes        Social History     Socioeconomic History    Marital status:      Spouse name: Not on file    Number of children: Not on file    Years of education: Not on file    Highest education level: Not on file   Tobacco Use    Smoking status: Never Smoker    Smokeless tobacco: Never Used   Substance and Sexual Activity    Alcohol use: No    Drug use: No    Sexual activity: Never     Family History   Problem Relation Age of Onset    Heart Disease Mother     Kidney Disease Mother     Lung Disease Mother         COPD    Diabetes Brother     Pacemaker Brother     Kidney Disease Brother     Hypertension Brother     Anesth Problems Neg Hx        OBJECTIVE:     Visit Vitals  /72 (BP 1 Location: Left arm, BP Patient Position: Sitting)   Pulse 83   Temp 99.1 °F (37.3 °C) (Oral)   Resp 18   Ht 5' 4\" (1.626 m)   Wt 188 lb 3.2 oz (85.4 kg)   SpO2 97%   BMI 32.30 kg/m²     CONSTITUTIONAL:   well nourished, appears age appropriate  EYES: sclera anicteric, PERRL, EOMI  ENMT:nares clear, moist mucous membranes, pharynx clear  NECK: supple. Thyroid normal, No JVD or bruits  RESPIRATORY: Chest: clear to ascultation and percussion, normal inspiratory effort  CARDIOVASCULAR: Heart: regular rate and rhythm no murmurs, rubs or gallops, PMI not displaced, No thrills  GASTROINTESTINAL: Abdomen: non distended, soft, non tender, bowel sounds normal  HEMATOLOGIC: no purpura, petechiae or bruising  LYMPHATIC: No lymph node enlargemant  MUSCULOSKELETAL: Extremities: no edema or active synovitis, pulse 1+   INTEGUMENT: No unusual rashes or suspicious skin lesions noted.  Nails appear normal.  PERIPHERAL VASCULAR: normal pulses femoral, PT and DP  NEUROLOGIC: non-focal exam, A & O X 3  PSYCHIATRIC:, appropriate affect     ASSESSMENT:   1. Interstitial lung disease ([facility] [address]    2. Controlled type 2 diabetes mellitus with stage 2 chronic kidney disease, with long-term current use of insulin ([facility] [address]    3. Cough      Impression  1. Lung disease chest x-ray shows increased markings consistent with bronchitis but no pneumonia. In light of her severe distress a marked amount of cough and does not respond to the outpatient treatment and will place her in hospital for IV antibiotic therapy as well as IV steroids. I will also get pulmonary consult while hospitalized. See admit note for further details. PLAN:  .  Orders Placed This Encounter    XR CHEST PA LAT    METABOLIC PANEL, BASIC ([facility])    CBC WITH AUTOMATED DIFF ([facility])    cholecalciferol (VITAMIN D3) 1,000 unit cap         ATTENTION:   This medical record was transcribed using an electronic medical records system. Although proofread, it may and can contain electronic and spelling errors. Other human spelling and other errors may be present. Corrections may be executed at a later time. Please feel free to contact us for any clarifications as needed. No results found for any visits on 04/30/19. Latanya Baumann MD    The patient verbalized understanding of the problems and plans as explained.

## 2019-04-30 ENCOUNTER — HOSPITAL ENCOUNTER (INPATIENT)
Age: 74
LOS: 3 days | Discharge: HOME | DRG: 202 | End: 2019-05-03
Attending: INTERNAL MEDICINE | Admitting: INTERNAL MEDICINE
Payer: COMMERCIAL

## 2019-04-30 ENCOUNTER — OFFICE VISIT (OUTPATIENT)
Dept: INTERNAL MEDICINE CLINIC | Age: 74
End: 2019-04-30

## 2019-04-30 VITALS
HEIGHT: 64 IN | OXYGEN SATURATION: 97 % | BODY MASS INDEX: 32.13 KG/M2 | SYSTOLIC BLOOD PRESSURE: 104 MMHG | TEMPERATURE: 99.1 F | WEIGHT: 188.2 LBS | RESPIRATION RATE: 18 BRPM | HEART RATE: 83 BPM | DIASTOLIC BLOOD PRESSURE: 72 MMHG

## 2019-04-30 DIAGNOSIS — N18.2 CONTROLLED TYPE 2 DIABETES MELLITUS WITH STAGE 2 CHRONIC KIDNEY DISEASE, WITH LONG-TERM CURRENT USE OF INSULIN (HCC): ICD-10-CM

## 2019-04-30 DIAGNOSIS — E11.22 CONTROLLED TYPE 2 DIABETES MELLITUS WITH STAGE 2 CHRONIC KIDNEY DISEASE, WITH LONG-TERM CURRENT USE OF INSULIN (HCC): ICD-10-CM

## 2019-04-30 DIAGNOSIS — I12.9 HYPERTENSION WITH RENAL DISEASE: ICD-10-CM

## 2019-04-30 DIAGNOSIS — J30.89 NON-SEASONAL ALLERGIC RHINITIS, UNSPECIFIED TRIGGER: ICD-10-CM

## 2019-04-30 DIAGNOSIS — Z79.4 CONTROLLED TYPE 2 DIABETES MELLITUS WITH STAGE 2 CHRONIC KIDNEY DISEASE, WITH LONG-TERM CURRENT USE OF INSULIN (HCC): ICD-10-CM

## 2019-04-30 DIAGNOSIS — J84.9 INTERSTITIAL LUNG DISEASE (HCC): Primary | ICD-10-CM

## 2019-04-30 DIAGNOSIS — K58.9 IRRITABLE BOWEL SYNDROME, UNSPECIFIED TYPE: ICD-10-CM

## 2019-04-30 DIAGNOSIS — J84.9 INTERSTITIAL LUNG DISEASE (HCC): ICD-10-CM

## 2019-04-30 DIAGNOSIS — N18.2 CKD (CHRONIC KIDNEY DISEASE), STAGE II: ICD-10-CM

## 2019-04-30 DIAGNOSIS — R05.9 COUGH: ICD-10-CM

## 2019-04-30 DIAGNOSIS — J20.9 ACUTE BRONCHITIS, UNSPECIFIED ORGANISM: ICD-10-CM

## 2019-04-30 LAB
ALBUMIN SERPL-MCNC: 3.6 G/DL (ref 3.5–5)
ALBUMIN/GLOB SERPL: 1 {RATIO} (ref 1.1–2.2)
ALP SERPL-CCNC: 68 U/L (ref 45–117)
ALT SERPL-CCNC: 56 U/L (ref 12–78)
ANION GAP SERPL CALC-SCNC: 9 MMOL/L (ref 5–15)
APPEARANCE UR: CLEAR
AST SERPL-CCNC: 41 U/L (ref 15–37)
BACTERIA URNS QL MICRO: ABNORMAL /HPF
BASOPHILS # BLD: 0.1 K/UL (ref 0–0.1)
BASOPHILS NFR BLD: 1 % (ref 0–1)
BILIRUB SERPL-MCNC: 0.5 MG/DL (ref 0.2–1)
BILIRUB UR QL: NEGATIVE
BUN SERPL-MCNC: 21 MG/DL (ref 6–20)
BUN/CREAT SERPL: 17 (ref 12–20)
CALCIUM SERPL-MCNC: 9.3 MG/DL (ref 8.5–10.1)
CHLORIDE SERPL-SCNC: 103 MMOL/L (ref 97–108)
CO2 SERPL-SCNC: 27 MMOL/L (ref 21–32)
COLOR UR: ABNORMAL
CREAT SERPL-MCNC: 1.26 MG/DL (ref 0.55–1.02)
DIFFERENTIAL METHOD BLD: ABNORMAL
EOSINOPHIL # BLD: 0.1 K/UL (ref 0–0.4)
EOSINOPHIL NFR BLD: 1 % (ref 0–7)
EPITH CASTS URNS QL MICRO: ABNORMAL /LPF
ERYTHROCYTE [DISTWIDTH] IN BLOOD BY AUTOMATED COUNT: 13.2 % (ref 11.5–14.5)
GLOBULIN SER CALC-MCNC: 3.5 G/DL (ref 2–4)
GLUCOSE BLD STRIP.AUTO-MCNC: 220 MG/DL (ref 65–100)
GLUCOSE BLD STRIP.AUTO-MCNC: 273 MG/DL (ref 65–100)
GLUCOSE SERPL-MCNC: 300 MG/DL (ref 65–100)
GLUCOSE UR STRIP.AUTO-MCNC: >1000 MG/DL
HCT VFR BLD AUTO: 39.3 % (ref 35–47)
HGB BLD-MCNC: 13.6 G/DL (ref 11.5–16)
HGB UR QL STRIP: NEGATIVE
HYALINE CASTS URNS QL MICRO: ABNORMAL /LPF (ref 0–5)
IMM GRANULOCYTES # BLD AUTO: 0 K/UL (ref 0–0.04)
IMM GRANULOCYTES NFR BLD AUTO: 0 % (ref 0–0.5)
KETONES UR QL STRIP.AUTO: NEGATIVE MG/DL
LEUKOCYTE ESTERASE UR QL STRIP.AUTO: NEGATIVE
LYMPHOCYTES # BLD: 0.7 K/UL (ref 0.8–3.5)
LYMPHOCYTES NFR BLD: 12 % (ref 12–49)
MCH RBC QN AUTO: 33.6 PG (ref 26–34)
MCHC RBC AUTO-ENTMCNC: 34.6 G/DL (ref 30–36.5)
MCV RBC AUTO: 97 FL (ref 80–99)
MONOCYTES # BLD: 0.3 K/UL (ref 0–1)
MONOCYTES NFR BLD: 5 % (ref 5–13)
NEUTS SEG # BLD: 4.4 K/UL (ref 1.8–8)
NEUTS SEG NFR BLD: 81 % (ref 32–75)
NITRITE UR QL STRIP.AUTO: NEGATIVE
NRBC # BLD: 0 K/UL (ref 0–0.01)
NRBC BLD-RTO: 0 PER 100 WBC
PH UR STRIP: 7 [PH] (ref 5–8)
PLATELET # BLD AUTO: 229 K/UL (ref 150–400)
PMV BLD AUTO: 9.8 FL (ref 8.9–12.9)
POTASSIUM SERPL-SCNC: 4.4 MMOL/L (ref 3.5–5.1)
PROT SERPL-MCNC: 7.1 G/DL (ref 6.4–8.2)
PROT UR STRIP-MCNC: NEGATIVE MG/DL
RBC # BLD AUTO: 4.05 M/UL (ref 3.8–5.2)
RBC #/AREA URNS HPF: ABNORMAL /HPF (ref 0–5)
SERVICE CMNT-IMP: ABNORMAL
SERVICE CMNT-IMP: ABNORMAL
SODIUM SERPL-SCNC: 139 MMOL/L (ref 136–145)
SP GR UR REFRACTOMETRY: 1.02 (ref 1–1.03)
UROBILINOGEN UR QL STRIP.AUTO: 0.2 EU/DL (ref 0.2–1)
WBC # BLD AUTO: 5.6 K/UL (ref 3.6–11)
WBC URNS QL MICRO: ABNORMAL /HPF (ref 0–4)

## 2019-04-30 PROCEDURE — 94640 AIRWAY INHALATION TREATMENT: CPT

## 2019-04-30 PROCEDURE — 93005 ELECTROCARDIOGRAM TRACING: CPT

## 2019-04-30 PROCEDURE — 80053 COMPREHEN METABOLIC PANEL: CPT

## 2019-04-30 PROCEDURE — 74011250637 HC RX REV CODE- 250/637: Performed by: INTERNAL MEDICINE

## 2019-04-30 PROCEDURE — 82962 GLUCOSE BLOOD TEST: CPT

## 2019-04-30 PROCEDURE — 77030029684 HC NEB SM VOL KT MONA -A

## 2019-04-30 PROCEDURE — 65270000015 HC RM PRIVATE ONCOLOGY

## 2019-04-30 PROCEDURE — 85025 COMPLETE CBC W/AUTO DIFF WBC: CPT

## 2019-04-30 PROCEDURE — 36415 COLL VENOUS BLD VENIPUNCTURE: CPT

## 2019-04-30 PROCEDURE — 74011000250 HC RX REV CODE- 250: Performed by: INTERNAL MEDICINE

## 2019-04-30 PROCEDURE — 74011000258 HC RX REV CODE- 258: Performed by: INTERNAL MEDICINE

## 2019-04-30 PROCEDURE — 74011250636 HC RX REV CODE- 250/636: Performed by: INTERNAL MEDICINE

## 2019-04-30 PROCEDURE — 81001 URINALYSIS AUTO W/SCOPE: CPT

## 2019-04-30 RX ORDER — PRAVASTATIN SODIUM 40 MG/1
80 TABLET ORAL
Status: DISCONTINUED | OUTPATIENT
Start: 2019-04-30 | End: 2019-05-03 | Stop reason: HOSPADM

## 2019-04-30 RX ORDER — HYDROCHLOROTHIAZIDE 25 MG/1
25 TABLET ORAL DAILY
Status: DISCONTINUED | OUTPATIENT
Start: 2019-05-01 | End: 2019-05-03 | Stop reason: HOSPADM

## 2019-04-30 RX ORDER — CARVEDILOL 3.12 MG/1
3.12 TABLET ORAL DAILY
Status: DISCONTINUED | OUTPATIENT
Start: 2019-05-01 | End: 2019-05-03 | Stop reason: HOSPADM

## 2019-04-30 RX ORDER — BENZONATATE 100 MG/1
200 CAPSULE ORAL
Status: DISCONTINUED | OUTPATIENT
Start: 2019-04-30 | End: 2019-04-30 | Stop reason: SDUPTHER

## 2019-04-30 RX ORDER — CLINDAMYCIN PHOSPHATE 10 MG/G
GEL TOPICAL 2 TIMES DAILY
Status: DISCONTINUED | OUTPATIENT
Start: 2019-04-30 | End: 2019-05-03 | Stop reason: HOSPADM

## 2019-04-30 RX ORDER — THERA TABS 400 MCG
1 TAB ORAL DAILY
Status: DISCONTINUED | OUTPATIENT
Start: 2019-05-01 | End: 2019-05-03 | Stop reason: HOSPADM

## 2019-04-30 RX ORDER — BENZONATATE 100 MG/1
200 CAPSULE ORAL
Status: DISCONTINUED | OUTPATIENT
Start: 2019-04-30 | End: 2019-05-03 | Stop reason: HOSPADM

## 2019-04-30 RX ORDER — BUPROPION HYDROCHLORIDE 150 MG/1
150 TABLET ORAL DAILY
Status: DISCONTINUED | OUTPATIENT
Start: 2019-05-01 | End: 2019-05-03 | Stop reason: HOSPADM

## 2019-04-30 RX ORDER — BENZONATATE 200 MG/1
CAPSULE ORAL
Qty: 30 CAP | Refills: 0 | Status: SHIPPED | OUTPATIENT
Start: 2019-04-30 | End: 2019-06-07 | Stop reason: SDUPTHER

## 2019-04-30 RX ORDER — ENOXAPARIN SODIUM 100 MG/ML
40 INJECTION SUBCUTANEOUS EVERY 24 HOURS
Status: DISCONTINUED | OUTPATIENT
Start: 2019-04-30 | End: 2019-05-03 | Stop reason: HOSPADM

## 2019-04-30 RX ORDER — SODIUM CHLORIDE 0.9 % (FLUSH) 0.9 %
5-40 SYRINGE (ML) INJECTION EVERY 8 HOURS
Status: DISCONTINUED | OUTPATIENT
Start: 2019-04-30 | End: 2019-05-03 | Stop reason: HOSPADM

## 2019-04-30 RX ORDER — ACETAMINOPHEN 325 MG/1
650 TABLET ORAL
Status: DISCONTINUED | OUTPATIENT
Start: 2019-04-30 | End: 2019-05-03 | Stop reason: HOSPADM

## 2019-04-30 RX ORDER — METFORMIN HYDROCHLORIDE 500 MG/1
1000 TABLET, EXTENDED RELEASE ORAL
Status: DISCONTINUED | OUTPATIENT
Start: 2019-04-30 | End: 2019-05-03 | Stop reason: HOSPADM

## 2019-04-30 RX ORDER — SERTRALINE HYDROCHLORIDE 50 MG/1
100 TABLET, FILM COATED ORAL DAILY
Status: DISCONTINUED | OUTPATIENT
Start: 2019-05-01 | End: 2019-05-03 | Stop reason: HOSPADM

## 2019-04-30 RX ORDER — OXAZEPAM 15 MG/1
15 CAPSULE ORAL
Status: DISCONTINUED | OUTPATIENT
Start: 2019-04-30 | End: 2019-05-03 | Stop reason: HOSPADM

## 2019-04-30 RX ORDER — SODIUM CHLORIDE 0.9 % (FLUSH) 0.9 %
5-40 SYRINGE (ML) INJECTION AS NEEDED
Status: DISCONTINUED | OUTPATIENT
Start: 2019-04-30 | End: 2019-05-03 | Stop reason: HOSPADM

## 2019-04-30 RX ORDER — MELATONIN
1000 DAILY
Status: DISCONTINUED | OUTPATIENT
Start: 2019-05-01 | End: 2019-05-03 | Stop reason: HOSPADM

## 2019-04-30 RX ORDER — ZOLPIDEM TARTRATE 5 MG/1
5 TABLET ORAL
Status: DISCONTINUED | OUTPATIENT
Start: 2019-04-30 | End: 2019-05-03 | Stop reason: HOSPADM

## 2019-04-30 RX ORDER — IPRATROPIUM BROMIDE AND ALBUTEROL SULFATE 2.5; .5 MG/3ML; MG/3ML
3 SOLUTION RESPIRATORY (INHALATION)
Status: DISCONTINUED | OUTPATIENT
Start: 2019-04-30 | End: 2019-05-01

## 2019-04-30 RX ORDER — FENOFIBRATE 145 MG/1
145 TABLET, COATED ORAL DAILY
Status: DISCONTINUED | OUTPATIENT
Start: 2019-05-01 | End: 2019-05-03 | Stop reason: HOSPADM

## 2019-04-30 RX ORDER — GLUCOSAMINE SULFATE 1500 MG
POWDER IN PACKET (EA) ORAL DAILY
COMMUNITY
End: 2021-10-06 | Stop reason: ALTCHOICE

## 2019-04-30 RX ADMIN — PRAVASTATIN SODIUM 80 MG: 40 TABLET ORAL at 21:35

## 2019-04-30 RX ADMIN — METHYLPREDNISOLONE SODIUM SUCCINATE 40 MG: 40 INJECTION, POWDER, FOR SOLUTION INTRAMUSCULAR; INTRAVENOUS at 19:12

## 2019-04-30 RX ADMIN — Medication 10 ML: at 19:16

## 2019-04-30 RX ADMIN — AZITHROMYCIN DIHYDRATE 500 MG: 500 INJECTION, POWDER, LYOPHILIZED, FOR SOLUTION INTRAVENOUS at 20:24

## 2019-04-30 RX ADMIN — METFORMIN HYDROCHLORIDE 1000 MG: 500 TABLET, EXTENDED RELEASE ORAL at 19:11

## 2019-04-30 RX ADMIN — ENOXAPARIN SODIUM 40 MG: 40 INJECTION SUBCUTANEOUS at 19:11

## 2019-04-30 RX ADMIN — CLINDAMYCIN PHOSPHATE: 10 GEL TOPICAL at 21:34

## 2019-04-30 RX ADMIN — OXAZEPAM 15 MG: 15 CAPSULE, GELATIN COATED ORAL at 23:15

## 2019-04-30 RX ADMIN — CEFTRIAXONE 1 G: 1 INJECTION, POWDER, FOR SOLUTION INTRAMUSCULAR; INTRAVENOUS at 19:11

## 2019-04-30 RX ADMIN — Medication 10 ML: at 21:35

## 2019-04-30 RX ADMIN — IPRATROPIUM BROMIDE AND ALBUTEROL SULFATE 3 ML: .5; 3 SOLUTION RESPIRATORY (INHALATION) at 20:39

## 2019-04-30 RX ADMIN — METHYLPREDNISOLONE SODIUM SUCCINATE 40 MG: 40 INJECTION, POWDER, FOR SOLUTION INTRAMUSCULAR; INTRAVENOUS at 23:15

## 2019-04-30 NOTE — PATIENT INSTRUCTIONS
Interstitial Lung Disease: Care Instructions  Your Care Instructions    Interstitial lung disease is a long-term (chronic) lung disease. It happens because of damage between the air sacs in the lung. The damage scars the lung and causes breathing problems. People with interstitial lung disease get breathless during exercise and may have a dry cough. These problems may get worse slowly or very quickly. Interstitial lung disease can be caused by breathing in dust from asbestos and silica. It also can be caused by infections and some medicines. Sometimes doctors cannot find the cause. You may get medicine to treat the problem. Corticosteroids can sometimes reduce the swelling of lung tissue and prevent more damage. Oxygen treatment may help your condition. Follow-up care is a key part of your treatment and safety. Be sure to make and go to all appointments, and call your doctor if you are having problems. It's also a good idea to know your test results and keep a list of the medicines you take. How can you care for yourself at home? · Do not smoke. Smoking makes interstitial lung disease worse. If you need help quitting, talk to your doctor about stop-smoking programs and medicines. These can increase your chances of quitting for good. · Take your medicines exactly as prescribed. Call your doctor if you have any problems with your medicine. · Get flu and pneumococcal shots. These help prevent lung infection. · Make an exercise plan with help from your doctor or other health professional. Exercise can help you breathe more easily. · Think about joining a support group. This can help you cope with problems caused by interstitial lung disease. When should you call for help?   Call your doctor now or seek immediate medical care if:    · Your shortness of breath gets worse.     · You cough up blood.     · You have severe chest pain.    Watch closely for changes in your health, and be sure to contact your doctor if you have any problems. Where can you learn more? Go to http://malia-lincoln.info/. Enter G983 in the search box to learn more about \"Interstitial Lung Disease: Care Instructions. \"  Current as of: September 5, 2018  Content Version: 11.9  © 1569-2845 [employer]. Care instructions adapted under license by [employer] (which disclaims liability or warranty for this information). If you have questions about a medical condition or this instruction, always ask your healthcare professional. [address] any warranty or liability for your use of this information.

## 2019-04-30 NOTE — H&P
ADMISSION NOTE 
 
NAME:  Asya Hunter :   1945 MRN:   [id number] Date/Time:  2019 4:50 PM 
Subjective: CHIEF COMPLAINT:  SOB HISTORY OF PRESENT ILLNESS:    
Zaida Blanco is a 69 y.o.  white female who presents with  increasing cough and congestion that is getting worse on a daily basis. She was seen by me on  after being hospitalized at [facility] for cervical spine surgery. While there she was having some problems with shortness of breath and had a CT scan which was read as interstitial lung disease. When I saw her on the  she was having a significant amount of coughing and shortness of breath and I placed on prednisone and followed up at which time she seemed to be getting a little better but she seems to be getting worse now. She has also developed a little low-grade fever over the past couple days. She does note a heaviness in her chest which is constant that she notes each time she takes a deep breath or tries to breathe at all. Her cough is nonproductive. There are no other cardiorespiratory complaints except for cough, shortness of breath and chest heaviness. She denies any GI or  complaints. She denies any headaches, dizziness or neurologic complaints. There are no other complaints on complete review of systems. Past Medical History:  
Diagnosis Date  Abnormal LFTs 2017 FATTY LIVER  Arthritis OSTEO  
 Avascular necrosis of hip ([address] 2017 BILAT HIPS  Breast CA ([address] ,  BILATERAL; SURGERY, CHEMO  Chronic pain  CKD (chronic kidney disease), stage II 2017  Coagulation disorder ([address] 1984 ITP  (DR CHU BRADSHAW) - PLATELETS DROPPED TO 5K  Depression  Diabetes ([address] TYPE 2; NIDDM  DJD (degenerative joint disease), multiple sites 2017  GI bleed  HEMORRHOIDS  Hyperlipemia  Hypertension with renal disease 2017  IBS (irritable bowel syndrome) 2017  Ill-defined condition 2015 PNEUMONIA X2 - HOSPITALIZED IN 2015  Interstitial lung disease ([facility] [address] 04/01/2019  Liver disease FATTY LIVER  
 Myalgia 8/24/2017  On statin therapy 8/24/2017  Overactive bladder 8/24/2017  Pneumonia 04/2015 HOSPITALIZED 3 WEEKS.  Polymyalgia rheumatica ([facility] [address] 8/24/2017  Prophylactic antibiotic 8/24/2017  Rosacea Past Surgical History:  
Procedure Laterality Date [place] [address], [address]  HX BREAST BIOPSY Bilateral   
 HX CATARACT REMOVAL Bilateral   
 HX COLONOSCOPY    
 HX ENDOSCOPY    
 HX GI    
 COLONOSCOPY  
 HX HEENT    
 WISDOM TEETH  
 HX HYSTERECTOMY  2000S  
 HX MASTECTOMY Right 1989  HX MASTECTOMY Left 2001 [facility] ORTHOPAEDIC  2008 LUMBAR FUSION  
 HX ORTHOPAEDIC  2018 RE-DO LUMBAR FUSION, AND ADDED THORACIC FUSION  
 HX ORTHOPAEDIC  03/26/2019  
 neckectomy [address]  HX UROLOGICAL  2000s BLADDER SLING  
 TOTAL HIP ARTHROPLASTY Left 11/16/2016 ANTERIOR APPROACH, DR Gwendolyn De La Torre; (POSTOP: STANDS ONE INCH TALLER ON LEFT FOOT)  TOTAL HIP ARTHROPLASTY Right 12/2016 ANTERIOR APPROACH (DR Gwendolyn De La Torre) Social History Tobacco Use  Smoking status: Never Smoker  Smokeless tobacco: Never Used Substance Use Topics  Alcohol use: No  
  
 
Family History Problem Relation Age of Onset  Heart Disease Mother  Kidney Disease Mother  Lung Disease Mother COPD  Diabetes Brother  Pacemaker Brother  Kidney Disease Brother  Hypertension Brother  Anesth Problems Neg Hx Allergies Allergen Reactions  Statins-Hmg-Coa Reductase Inhibitors Myalgia Myalgia with  multiple statins Takes pravachol  Codeine Rash Rash on thighs  Darvon [Propoxyphene] Other (comments) \"I see worms\"  Pcn [Penicillins] Rash  Sulfa (Sulfonamide Antibiotics) Rash Prior to Admission medications Medication Sig Start Date End Date Taking? Authorizing Provider  
benzonatate (TESSALON) 200 mg capsule TAKE 1 CAPSULE THREE TIMES DAILY IF NEEDED FOR COUGH 4/30/19   Taqueria Eric MD  
cholecalciferol (VITAMIN D3) 1,000 unit cap Take  by mouth daily. Indications: unknown of the mg. Provider, Historical  
carvedilol (COREG) 6.25 mg tablet Take 0.5 Tabs by mouth daily. 4/24/19   Taqueria Eric MD  
metFORMIN ER (GLUCOPHAGE XR) 500 mg tablet Take one tablet in the morning with breakfast and two tablets in the evening with dinner. 4/17/19   Taqueria Eric MD  
predniSONE (DELTASONE) 20 mg tablet Take 20 mg by mouth daily (with breakfast). Patient taking differently: Take 10 mg by mouth daily (with breakfast). Indications: 10mg 4/16/19   Taqueria Eric MD  
biotin-silicon diox-L-cysteine 3,000 mcg -100 mg-50 mg TbER Take 1 Tab by mouth daily. Provider, Historical  
glucose blood VI test strips (ACCU-CHEK CHERYL) strip Use once daily 2/11/19   Taqueria Eric MD  
hydroCHLOROthiazide (HYDRODIURIL) 25 mg tablet TAKE 1 TABLET EVERY DAY 1/18/19   Taqueria Eric MD  
fenofibrate nanocrystallized (TRICOR) 145 mg tablet Take 1 Tab by mouth daily. 1/15/19   Taqueria Eric MD  
oxazepam (SERAX) 15 mg capsule TAKE 2 CAPSULES AT BEDTIME 11/21/18   Taqueria Eric MD  
loratadine-pseudoephedrine (CLARITIN-D 12 HOUR) 5-120 mg per tablet Take 1 Tab by mouth daily as needed. Provider, Historical  
levocarnitine HCl (ACETYL-L-CARNITINE) Take 500 mg by mouth daily. Provider, Historical  
buPROPion XL (WELLBUTRIN XL) 150 mg tablet TAKE 1 TABLET BY MOUTH EVERY DAY Patient taking differently: Take 150 mg by mouth daily. TAKE 1 TABLET BY MOUTH EVERY DAY 7/6/18   Taqueria Eric MD  
sertraline (ZOLOFT) 100 mg tablet TAKE 1 TABLET BY MOUTH DAILY Patient taking differently: Take 100 mg by mouth daily.  TAKE 1 TABLET BY MOUTH DAILY 7/6/18   Danyell Venegas MD  
pravastatin (PRAVACHOL) 80 mg tablet Take 1 Tab by mouth nightly. 1/22/18   Danyell Venegas MD  
multivitamin (ONE A DAY) tablet Take 1 Tab by mouth daily. Provider, Historical  
azelaic acid (FINACEA) 15 % topical gel Apply  to affected area two (2) times a day. Provider, Historical  
clindamycin (CLINDAGEL) 1 % topical gel Apply  to affected area two (2) times a day. use thin film on affected area    Provider, Historical  
 
 
REVIEW OF SYSTEMS:   
 Constitutional:  negative for fevers, chills, anorexia and weight loss Eyes:   negative for visual disturbance and irritation ENT:   negative for hearing loss, tinnitus, nasal congestion, epistaxis, sore throat Neck:              negative for stiffness or swollen glands Respiratory:  Positive for cough nonproductive with chest heaviness and shortness of breath as noted above Cards:   negative for chest pain, palpitations, orthopnea, PND, lower extremity edema GI:   negative for dysphagia, nausea, vomiting, diarrhea, constipation and abdominal pain Genitourinary: negative for frequency, dysuria and hematuria Integument:  negative for rash and pruritus Hematologic:  negative for easy bruising and bleeding Lymphatic:      negative for swollen lymph nodes or night sweats Musculoskel: negative for myalgias, arthralgias, back pain and muscle weakness Neurological:  negative for headaches, dizziness, vertigo, memory problems and gait problems Behavl/Psych:  negative for anxiety, depression and illegal drug usage Objective: VITALS:   
Visit Vitals /72 (BP 1 Location: Left arm, BP Patient Position: Supine) Pulse 93 Temp 99.4 °F (37.4 °C) Resp 24 SpO2 94% PHYSICAL EXAM:  
General:    Alert, cooperative, no distress, appears stated age. Head:   Normocephalic, without obvious abnormality, atraumatic. Eyes:   Conjunctivae/corneas clear.   PERRL 
 Nose:  Nares normal. No drainage or sinus tenderness. Throat:    Lips, mucosa, and tongue normal.  No Thrush Neck:  Supple, symmetrical,  no adenopathy, thyroid: non tender 
  no carotid bruit and no JVD. Back:    Symmetric,  No CVA tenderness. Lungs:   Decreased breath sounds with scattered rhonchi and wheezes bilaterally and prolonged expiratory phase. No rales. Chest wall:  No tenderness or deformity. No Accessory muscle use. Heart:   Regular rate and rhythm,  no murmur, rub or gallop. Abdomen:   Soft, non-tender. Not distended. Bowel sounds normal. No masses Extremities: Extremities normal, atraumatic, No cyanosis. No edema. No clubbing Skin:     Texture, turgor normal. No rashes or lesions. Not Jaundiced Lymph nodes: Cervical, supraclavicular normal. 
Psych:  Good insight. Not depressed. Not anxious or agitated. Neurologic: EOMs intact. No facial asymmetry. No aphasia or slurred speech. Normal   strength, Alert and oriented X 3. LAB DATA REVIEWED:   
No results found for this or any previous visit (from the past 24 hour(s)). Assessment/Plan:  
  
Principal Problem: 
  Acute bronchitis (4/30/2019) Active Problems: 
  Controlled type 2 diabetes mellitus with stage 2 chronic kidney disease, with long-term current use of insulin ([facility] [address] (4/20/2015) Hypertension with renal disease (8/24/2017) CKD (chronic kidney disease), stage II (8/24/2017) Interstitial lung disease ([facility] [address] (4/9/2019) Non-seasonal allergic rhinitis (4/20/2015) IBS (irritable bowel syndrome) (8/24/2017) 
 
  
___________________________________________________ PLAN:   
Risk of deterioration:  []Low    [x]Moderate  []High 1. Start IV Rocephin and Zithromax for acute bronchitis 2. IV Solu-Medrol for acute respiratory failure 3. Scheduled jet nebulizer treatments with DuoNeb 4. Nasal oxygen as needed hypoxemia 5. Tessalon as needed for cough 6. Pulmonary consult 7.  Follow blood sugar and treat sliding scale insulin if needed 8. Continue p.m. metformin 9. Continue home dose of Coreg and hydrochlorothiazide for hypertension 10. Continue Wellbutrin and Zoloft as at home with Serax at bedtime Further orders pending initial response and findings Prophylaxis:  [x]Lovenox  []Coumadin  []Hep SQ  []SCDs  []H2B/PPI Disposition:  [x]Home w/ Family   []HH PT,OT,RN   []SNF/LTC   []SAH/Rehab Discussed Code Status:    [x]Full Code      []DNR    
___________________________________________________ Care Plan discussed with: 
  [x]Patient   []Family    []Specialist : 
 
___________________________________________________ Admitting Physician: Anitra Martin MD

## 2019-04-30 NOTE — PROGRESS NOTES
Elton Poag  Identified pt with two pt identifiers(name and ). Chief Complaint   Patient presents with    Hypertension     2 week follow up    Chronic Kidney Disease    Irritable Bowel Syndrome    Diabetes       1. Have you been to the ER, urgent care clinic since your last visit? Hospitalized since your last visit? No    2. Have you seen or consulted any other health care providers outside of the [address] [place] [address] since your last visit? Include any pap smears or colon screening. No      Health Maintenance Topics with due status: Overdue       Topic Date Due    EYE EXAM RETINAL OR DILATED 01/15/1957    DTaP/Tdap/Td series 01/15/1968    Shingrix Vaccine Age 50> 01/15/1997     Health Maintenance Topics with due status: Due Soon       Topic Date Due    GLAUCOMA SCREENING Q2Y 05/15/2019     Health Maintenance Topics with due status: Not Due       Topic Last Completion Date    COLONOSCOPY 10/03/2016    Influenza Age 9 to Adult 10/15/2018    MEDICARE YEARLY EXAM 2019    FOOT EXAM Q1 2019    MICROALBUMIN Q1 2019    HEMOGLOBIN A1C Q6M 2019    LIPID PANEL Q1 2019     Health Maintenance Topics with due status: Completed       Topic Last Completion Date    Bone Densitometry (Dexa) Screening 2014    Pneumococcal 65+ years 2015    Hepatitis C Screening 10/02/2017           Medication reconciliation up to date and corrected with patient at this time. Today's provider has been notified of reason for visit, vitals and flowsheets obtained on patients. Reviewed record in preparation for visit, huddled with provider and have obtained necessary documentation.         Wt Readings from Last 3 Encounters:   19 188 lb 3.2 oz (85.4 kg)   19 184 lb 12.8 oz (83.8 kg)   19 182 lb 12.8 oz (82.9 kg)     Temp Readings from Last 3 Encounters:   19 99.1 °F (37.3 °C) (Oral)   19 98.8 °F (37.1 °C) (Oral)   19 99.2 °F (37.3 °C) (Oral)     BP Readings from Last 3 Encounters:   04/30/19 104/72   04/16/19 130/88   04/09/19 132/86     Pulse Readings from Last 3 Encounters:   04/30/19 83   04/16/19 95   04/09/19 (!) 113     Vitals:    04/30/19 1356   BP: 104/72   Pulse: 83   Resp: 18   Temp: 99.1 °F (37.3 °C)   TempSrc: Oral   SpO2: 97%   Weight: 188 lb 3.2 oz (85.4 kg)   Height: 5' 4\" (1.626 m)   PainSc:   0 - No pain         Learning Assessment:  :     Learning Assessment 10/2/2017   PRIMARY LEARNER Patient   HIGHEST LEVEL OF EDUCATION - PRIMARY LEARNER  > 4 YEARS OF COLLEGE   PRIMARY LANGUAGE ENGLISH   LEARNER PREFERENCE PRIMARY LISTENING   ANSWERED BY patient   RELATIONSHIP SELF       Depression Screening:  :     3 most recent PHQ Screens 1/14/2019   Little interest or pleasure in doing things Not at all   Feeling down, depressed, irritable, or hopeless Not at all   Total Score PHQ 2 0       No flowsheet data found. Fall Risk Assessment:  :     Fall Risk Assessment, last 12 mths 4/16/2019   Able to walk? Yes   Fall in past 12 months? No       Abuse Screening:  :     Abuse Screening Questionnaire 1/14/2019 10/2/2017   Do you ever feel afraid of your partner? N N   Are you in a relationship with someone who physically or mentally threatens you? N N   Is it safe for you to go home?  Y Y       ADL Screening:  :     ADL Assessment 8/28/2018   Feeding yourself No Help Needed   Getting from bed to chair No Help Needed   Getting dressed No Help Needed   Bathing or showering No Help Needed   Walk across the room (includes cane/walker) No Help Needed   Using the telphone No Help Needed   Taking your medications No Help Needed   Preparing meals No Help Needed   Managing money (expenses/bills) No Help Needed   Moderately strenuous housework (laundry) No Help Needed   Shopping for personal items (toiletries/medicines) No Help Needed   Shopping for groceries No Help Needed   Driving No Help Needed   Climbing a flight of stairs No Help Needed   Getting to places beyond walking distances No Help Needed

## 2019-04-30 NOTE — PROGRESS NOTES
Oncology End of Shift Note Bedside shift change report given to Marcelino Alex RN (incoming nurse) by Galdino Card RN (outgoing nurse) on [facility]. Report included the following information SBAR.  
 
 
Shift Summary: IV antibiotics, Labs, EKG,  
 
 
Issues for Physician to Address:  
 
  
 
 
 
 
Galdino Card RN

## 2019-05-01 LAB
ANION GAP SERPL CALC-SCNC: 8 MMOL/L (ref 5–15)
ATRIAL RATE: 86 BPM
BUN SERPL-MCNC: 17 MG/DL (ref 6–20)
BUN/CREAT SERPL: 22 (ref 12–20)
CALCIUM SERPL-MCNC: 8.9 MG/DL (ref 8.5–10.1)
CALCULATED P AXIS, ECG09: 55 DEGREES
CALCULATED R AXIS, ECG10: -25 DEGREES
CALCULATED T AXIS, ECG11: 33 DEGREES
CHLORIDE SERPL-SCNC: 102 MMOL/L (ref 97–108)
CO2 SERPL-SCNC: 28 MMOL/L (ref 21–32)
CREAT SERPL-MCNC: 0.78 MG/DL (ref 0.55–1.02)
DIAGNOSIS, 93000: NORMAL
ERYTHROCYTE [DISTWIDTH] IN BLOOD BY AUTOMATED COUNT: 13.2 % (ref 11.5–14.5)
GLUCOSE BLD STRIP.AUTO-MCNC: 238 MG/DL (ref 65–100)
GLUCOSE BLD STRIP.AUTO-MCNC: 276 MG/DL (ref 65–100)
GLUCOSE BLD STRIP.AUTO-MCNC: 320 MG/DL (ref 65–100)
GLUCOSE BLD STRIP.AUTO-MCNC: 363 MG/DL (ref 65–100)
GLUCOSE SERPL-MCNC: 258 MG/DL (ref 65–100)
HCT VFR BLD AUTO: 39.8 % (ref 35–47)
HGB BLD-MCNC: 13.5 G/DL (ref 11.5–16)
MCH RBC QN AUTO: 33.2 PG (ref 26–34)
MCHC RBC AUTO-ENTMCNC: 33.9 G/DL (ref 30–36.5)
MCV RBC AUTO: 97.8 FL (ref 80–99)
NRBC # BLD: 0 K/UL (ref 0–0.01)
NRBC BLD-RTO: 0 PER 100 WBC
P-R INTERVAL, ECG05: 172 MS
PLATELET # BLD AUTO: 224 K/UL (ref 150–400)
PMV BLD AUTO: 9.6 FL (ref 8.9–12.9)
POTASSIUM SERPL-SCNC: 3.8 MMOL/L (ref 3.5–5.1)
Q-T INTERVAL, ECG07: 396 MS
QRS DURATION, ECG06: 94 MS
QTC CALCULATION (BEZET), ECG08: 473 MS
RBC # BLD AUTO: 4.07 M/UL (ref 3.8–5.2)
SERVICE CMNT-IMP: ABNORMAL
SODIUM SERPL-SCNC: 138 MMOL/L (ref 136–145)
VENTRICULAR RATE, ECG03: 86 BPM
WBC # BLD AUTO: 7.3 K/UL (ref 3.6–11)

## 2019-05-01 PROCEDURE — 94640 AIRWAY INHALATION TREATMENT: CPT

## 2019-05-01 PROCEDURE — 74011250636 HC RX REV CODE- 250/636: Performed by: INTERNAL MEDICINE

## 2019-05-01 PROCEDURE — 65270000015 HC RM PRIVATE ONCOLOGY

## 2019-05-01 PROCEDURE — 82962 GLUCOSE BLOOD TEST: CPT

## 2019-05-01 PROCEDURE — 85027 COMPLETE CBC AUTOMATED: CPT

## 2019-05-01 PROCEDURE — 74011000250 HC RX REV CODE- 250: Performed by: INTERNAL MEDICINE

## 2019-05-01 PROCEDURE — 74011000258 HC RX REV CODE- 258: Performed by: INTERNAL MEDICINE

## 2019-05-01 PROCEDURE — 36415 COLL VENOUS BLD VENIPUNCTURE: CPT

## 2019-05-01 PROCEDURE — 80048 BASIC METABOLIC PNL TOTAL CA: CPT

## 2019-05-01 PROCEDURE — 94760 N-INVAS EAR/PLS OXIMETRY 1: CPT

## 2019-05-01 PROCEDURE — 74011250637 HC RX REV CODE- 250/637: Performed by: INTERNAL MEDICINE

## 2019-05-01 RX ORDER — IPRATROPIUM BROMIDE AND ALBUTEROL SULFATE 2.5; .5 MG/3ML; MG/3ML
3 SOLUTION RESPIRATORY (INHALATION)
Status: DISCONTINUED | OUTPATIENT
Start: 2019-05-02 | End: 2019-05-03 | Stop reason: HOSPADM

## 2019-05-01 RX ADMIN — METHYLPREDNISOLONE SODIUM SUCCINATE 40 MG: 40 INJECTION, POWDER, FOR SOLUTION INTRAMUSCULAR; INTRAVENOUS at 06:06

## 2019-05-01 RX ADMIN — METHYLPREDNISOLONE SODIUM SUCCINATE 40 MG: 40 INJECTION, POWDER, FOR SOLUTION INTRAMUSCULAR; INTRAVENOUS at 18:07

## 2019-05-01 RX ADMIN — FENOFIBRATE 145 MG: 145 TABLET ORAL at 09:19

## 2019-05-01 RX ADMIN — METHYLPREDNISOLONE SODIUM SUCCINATE 40 MG: 40 INJECTION, POWDER, FOR SOLUTION INTRAMUSCULAR; INTRAVENOUS at 13:22

## 2019-05-01 RX ADMIN — IPRATROPIUM BROMIDE AND ALBUTEROL SULFATE 3 ML: .5; 3 SOLUTION RESPIRATORY (INHALATION) at 15:19

## 2019-05-01 RX ADMIN — SERTRALINE HYDROCHLORIDE 100 MG: 50 TABLET ORAL at 09:19

## 2019-05-01 RX ADMIN — IPRATROPIUM BROMIDE AND ALBUTEROL SULFATE 3 ML: .5; 3 SOLUTION RESPIRATORY (INHALATION) at 12:06

## 2019-05-01 RX ADMIN — IPRATROPIUM BROMIDE AND ALBUTEROL SULFATE 3 ML: .5; 3 SOLUTION RESPIRATORY (INHALATION) at 03:59

## 2019-05-01 RX ADMIN — METFORMIN HYDROCHLORIDE 1000 MG: 500 TABLET, EXTENDED RELEASE ORAL at 18:07

## 2019-05-01 RX ADMIN — THERA TABS 1 TABLET: TAB at 09:19

## 2019-05-01 RX ADMIN — ENOXAPARIN SODIUM 40 MG: 40 INJECTION SUBCUTANEOUS at 18:12

## 2019-05-01 RX ADMIN — IPRATROPIUM BROMIDE AND ALBUTEROL SULFATE 3 ML: .5; 3 SOLUTION RESPIRATORY (INHALATION) at 00:14

## 2019-05-01 RX ADMIN — IPRATROPIUM BROMIDE AND ALBUTEROL SULFATE 3 ML: .5; 3 SOLUTION RESPIRATORY (INHALATION) at 07:37

## 2019-05-01 RX ADMIN — PRAVASTATIN SODIUM 80 MG: 40 TABLET ORAL at 22:09

## 2019-05-01 RX ADMIN — VITAMIN D, TAB 1000IU (100/BT) 1000 UNITS: 25 TAB at 09:18

## 2019-05-01 RX ADMIN — AZITHROMYCIN DIHYDRATE 500 MG: 500 INJECTION, POWDER, LYOPHILIZED, FOR SOLUTION INTRAVENOUS at 18:16

## 2019-05-01 RX ADMIN — OXAZEPAM 15 MG: 15 CAPSULE, GELATIN COATED ORAL at 22:09

## 2019-05-01 RX ADMIN — Medication 10 ML: at 06:06

## 2019-05-01 RX ADMIN — CEFTRIAXONE 1 G: 1 INJECTION, POWDER, FOR SOLUTION INTRAMUSCULAR; INTRAVENOUS at 18:13

## 2019-05-01 RX ADMIN — HYDROCHLOROTHIAZIDE 25 MG: 25 TABLET ORAL at 09:19

## 2019-05-01 RX ADMIN — Medication 10 ML: at 22:10

## 2019-05-01 RX ADMIN — BUPROPION HYDROCHLORIDE 150 MG: 150 TABLET, FILM COATED, EXTENDED RELEASE ORAL at 09:19

## 2019-05-01 RX ADMIN — CLINDAMYCIN PHOSPHATE: 10 GEL TOPICAL at 18:10

## 2019-05-01 RX ADMIN — CARVEDILOL 3.12 MG: 3.12 TABLET, FILM COATED ORAL at 09:19

## 2019-05-01 RX ADMIN — IPRATROPIUM BROMIDE AND ALBUTEROL SULFATE 3 ML: .5; 3 SOLUTION RESPIRATORY (INHALATION) at 19:57

## 2019-05-01 RX ADMIN — Medication 10 ML: at 13:22

## 2019-05-01 RX ADMIN — CLINDAMYCIN PHOSPHATE: 10 GEL TOPICAL at 09:21

## 2019-05-01 NOTE — PROGRESS NOTES
Initial Nutrition Assessment: 
 
INTERVENTIONS/RECOMMENDATIONS:  
· Meals/Snacks: General/healthful diet:  Continue CCD for BG management. Enc po. ASSESSMENT:  
5/1:  Chart reviewed; med noted for acute bronchitis. Sig PMH includes DM with elevated BG levels (220-320). Pt is also on prednisone. Pt appears to have general knowledge about carb counting and reports that she frequently asked the call center how many grams of carbs she has ordered so she know how many more she can order. I also provided guidance on how to utilize the carb count on the menu to stay within guidelines (3-4 CHO servings per meal). Pt is utilizing room service. Diet Order: Consistent carb 
% Eaten:   
Patient Vitals for the past 72 hrs: 
 % Diet Eaten 05/01/19 0830 80 % Pertinent Medications: [x]Reviewed []Other Pertinent Labs: [x]Reviewed []Other Food Allergies: [x]None []Other Last BM:    [x]Active     []Hyperactive  []Hypoactive       [] Absent BS Skin:    [x] Intact   [] Incision  [] Breakdown  [] Other: Anthropometrics:  
Height:   Weight:    
IBW (%IBW):   ( ) UBW (%UBW):   (  %) Last Weight Metrics: 
Weight Loss Metrics 4/30/2019 4/16/2019 4/9/2019 3/26/2019 3/20/2019 3/5/2019 2/19/2019 Today's Wt 188 lb 3.2 oz 184 lb 12.8 oz 182 lb 12.8 oz 190 lb 190 lb 191 lb 6.4 oz 189 lb 12.8 oz BMI 32.3 kg/m2 31.72 kg/m2 31.38 kg/m2 32.61 kg/m2 32.61 kg/m2 32.85 kg/m2 32.58 kg/m2 BMI: There is no height or weight on file to calculate BMI. This BMI is indicative of: 
 []Underweight    []Normal    []Overweight    [] Obesity   [] Extreme Obesity (BMI>40) Estimated Nutrition Needs (Based on):  
1249 Kcals/day(BMR (1340) x 1. 3AF) , 72 g(1.0 g/kg bw) Protein Carbohydrate: At Least 130 g/day  Fluids: 1700 mL/day (1ml/kcal) NUTRITION DIAGNOSES:  
Problem:  Altered nutrition-related lab values Etiology: related to current medical condition Signs/Symptoms: as evidenced by elevated BG levels NUTRITION INTERVENTIONS: 
Meals/Snacks: General/healthful diet GOAL:  
PO intake >50% of meals while trend BG <180 mg/dl next 5-7 days LEARNING NEEDS (Diet, Food/Nutrient-Drug Interaction):  
 [x] None Identified 
 [] Identified and Education Provided/Documented 
 [] Identified and Pt declined/was not appropriate Cultureal, Religion, OR Ethnic Dietary Needs:  
 [x] None Identified 
 [] Identified and Addressed 
 
 [x] Interdisciplinary Care Plan Reviewed/Documented  
 [x] Discharge Planning:  Continue CCD for BG management MONITORING /EVALUATION:  
Food/Nutrient Intake Outcomes: Total energy intake, Carbohydrate intake Physical Signs/Symptoms Outcomes: Weight/weight change, Glucose profile NUTRITION RISK:  
 [x] Patient At Nutritional Risk  
 [] Patient Not At Nutritional Risk PT SEEN FOR:  
 [x]  MD Consult: []Calorie Count []Diabetic Diet Education []Diet Education []Electrolyte Management 
   [x]General Nutrition Management and Supplements []Management of Tube Feeding []TPN Recommendations []  RN Referral:  []MST score >=2 
   []Enteral/Parenteral Nutrition PTA []Pregnant: Gestational DM or Multigestation 
   []Pressure Ulcer/Wound Care needs 
     
[]  Low BMI 
[]  PONCHO Abdul RD Pager [phone] Weekend Pager [phone]

## 2019-05-01 NOTE — PROGRESS NOTES
Oncology End of Shift Note Bedside shift change report given to Manoj Phan RN (incoming nurse) by Parul Rodriguez (outgoing nurse) on [facility]. Report included the following information SBAR, Kardex and MAR. Shift Summary: Patient received all medications. Patient had a bath today. Both IV antibiotics were hung. Issues for Physician to Address:  None. Patient on Cardiac Monitoring? [] Yes 
[x] No 
 
Rhythm:   
 
 
 
 
 
Parul Rodriguez

## 2019-05-01 NOTE — PROGRESS NOTES
ADULT PROTOCOL: JET AEROSOL ASSESSMENT Patient  Candida Bassett     69 y.o.   female     4/30/2019  9:13 PM 
 
Breath Sounds Pre Procedure: Right Breath Sounds: Lower, Coarse Left Breath Sounds: Lower, Coarse Breath Sounds Post Procedure: Right Breath Sounds: Lower, Coarse Left Breath Sounds: Lower, Coarse Breathing pattern: Pre procedure Breathing Pattern: Regular Post procedure Breathing Pattern: Regular Heart Rate: Pre procedure Pulse: 87 
         Post procedure Pulse: 91 
 
Resp Rate: Pre procedure Respirations: 20 
         Post procedure Respirations: 22 Oxygen: O2 Device: Room air Changed: NO SpO2: Pre procedure SpO2: 98 %   without oxygen Post procedure SpO2: 98 %  without oxygen Nebulizer Therapy: Current medications Aerosolized Medications: DuoNeb Changed: NO Smoking History: never Problem List:  
Patient Active Problem List  
Diagnosis Code  Controlled type 2 diabetes mellitus with stage 2 chronic kidney disease, with long-term current use of insulin ([facility]) E11.22, N18.2, Z79.4  Non-seasonal allergic rhinitis J30.89  Class 1 obesity due to excess calories without serious comorbidity with body mass index (BMI) of 33.0 to 33.9 in adult E66.09, Z68.33  
 Rosacea L71.9  Mixed hyperlipidemia E78.2  Anxiety F41.9  GI bleed K92.2  Overactive bladder N32.81  
 Polymyalgia rheumatica (HCC) M35.3  
 IBS (irritable bowel syndrome) K58.9  Hypertension with renal disease I12.9  CKD (chronic kidney disease), stage II N18.2  Avascular necrosis of hip ([address] M87.059  Abnormal LFTs R94.5  Annual physical exam Z00.00  Vitamin D deficiency E55.9  Recurrent depression ([address] F33.9  Primary osteoarthritis involving multiple joints M15.0  Sleep disturbance G47.9  Pre-operative cardiovascular examination Z01.810  Lumbar disc disease M51.9  Spinal stenosis, lumbar M48.061  Medicare annual wellness visit, subsequent Z00.00  Dyspnea on exertion R06.09  
 Spinal stenosis, cervical region M48.02  
 Cervical stenosis of spine M48.02  
 Interstitial lung disease (HCC) J84.9  Acute bronchitis J20.9 Respiratory Therapist: Steven Bansal RT

## 2019-05-01 NOTE — CDMP QUERY
Dr. Gus Moreno, Patient admitted with Acute bronchitis. Noted documentation of \"acute respiratory failure\" in progress note on 5/1/19. Please document in progress notes clinical indicators (such as the ones below) to support this diagnosis. - Respirations <12 or >25 - Air hunger/gasping - Use of accessory muscles of respiration/increased work of breathing - Sternal or intercostal retractions - Stridor - Inability to speak in full sentences - Cyanosis - Pulse ox <90% RA or <95% on O2  
- pH <7.35 or >7.45  
- pO2 < 60 mm Hg (or 10mm below COPD patient's baseline) - pCO2 >50mm Hg (or 10mm above COPD patient's baseline) The medical record reflects the following: 
   Risk Factors: 74 WF w/hx: Interstitial lung disease Clinical Indicators: Per progress note \"Acute respiratory failure\" with SpO2 97-98% on room air Treatment: Solu-medrol IV Thank you, [email or web address] [phone]

## 2019-05-01 NOTE — PROGRESS NOTES
PROGRESS NOTE 
 
NAME:  Sheree Caraballo :   1945 MRN:   [id number] Date/Time:  2019 5:53 AM 
Subjective:  
History:  Chart reviewed and patient seen and examined this AM and D/W her nurse  and all events noted. She presented with  increasing cough and congestion that was getting worse on a daily basis. Alex Villalta was seen by me on  after being hospitalized at [facility] for cervical spine surgery.  While there she was having some problems with shortness of breath and had a CT scan which was read as interstitial lung disease.  When I saw her on the  she was having a significant amount of coughing and shortness of breath and I placed on prednisone and followed up at which time she seemed to be getting a little better but subsequently seemed to be getting worse. Alex Villalta has also developed a little low-grade fever over the couple days PTA. Alex Villalta did note a heaviness in her chest which was constant that she noteed each time she took a deep breath or tried to breathe at all. Her cough has been nonproductive. There are no other cardiorespiratory complaints except for cough, shortness of breath and chest heaviness. She denies any GI or  complaints. She denies any headaches, dizziness or neurologic complaints. There are no other complaints on complete review of systems. She feels some better now after treatment with IV antibiotics, JA and IV Solumedrol. Medications reviewed: 
Current Facility-Administered Medications Medication Dose Route Frequency  benzonatate (TESSALON) capsule 200 mg  200 mg Oral TID PRN  
 buPROPion XL (WELLBUTRIN XL) tablet 150 mg  150 mg Oral DAILY  carvedilol (COREG) tablet 3.125 mg  3.125 mg Oral DAILY  cholecalciferol (VITAMIN D3) tablet 1,000 Units  1,000 Units Oral DAILY  clindamycin (CLINDAGEL) 1 % gel   Topical BID  fenofibrate nanocrystallized (TRICOR) tablet 145 mg  145 mg Oral DAILY  hydroCHLOROthiazide (HYDRODIURIL) tablet 25 mg  25 mg Oral DAILY  loratadine-pseudoephedrine (CLARITIN-D 12-hour) 5-120 mg per tablet 1 Tab  1 Tab Oral DAILY PRN  
 metFORMIN ER (GLUCOPHAGE XR) tablet 1,000 mg  1,000 mg Oral DAILY WITH DINNER  therapeutic multivitamin (THERAGRAN) tablet 1 Tab  1 Tab Oral DAILY  oxazepam (SERAX) capsule 15 mg  15 mg Oral QHS PRN  pravastatin (PRAVACHOL) tablet 80 mg  80 mg Oral QHS  sertraline (ZOLOFT) tablet 100 mg  100 mg Oral DAILY  sodium chloride (NS) flush 5-40 mL  5-40 mL IntraVENous Q8H  
 sodium chloride (NS) flush 5-40 mL  5-40 mL IntraVENous PRN  
 zolpidem (AMBIEN) tablet 5 mg  5 mg Oral QHS PRN  
 acetaminophen (TYLENOL) tablet 650 mg  650 mg Oral Q4H PRN  
 enoxaparin (LOVENOX) injection 40 mg  40 mg SubCUTAneous Q24H  cefTRIAXone (ROCEPHIN) 1 g in 0.9% sodium chloride (MBP/ADV) 50 mL  1 g IntraVENous Q24H  
 albuterol-ipratropium (DUO-NEB) 2.5 MG-0.5 MG/3 ML  3 mL Nebulization Q4H RT  
 methylPREDNISolone (PF) (SOLU-MEDROL) injection 40 mg  40 mg IntraVENous Q6H  
 azithromycin (ZITHROMAX) 500 mg in 0.9% sodium chloride (MBP/ADV) 250 mL  500 mg IntraVENous Q24H Objective:  
Vitals: 
Visit Vitals /71 (BP 1 Location: Right arm, BP Patient Position: At rest) Pulse 84 Temp 98.3 °F (36.8 °C) Resp 20 SpO2 98% Breastfeeding? No  
   O2 Device: Room air Temp (24hrs), Av.9 °F (37.2 °C), Min:98.3 °F (36.8 °C), Max:99.4 °F (37.4 °C) Last 24hr Input/Output: 
No intake or output data in the 24 hours ending 19 0543 PHYSICAL EXAM: 
General:     Alert, cooperative, no distress, appears stated age. Head:    Normocephalic, without obvious abnormality, atraumatic. Eyes:    Conjunctivae/corneas clear. PERRLA Nose:   Nares normal. No drainage or sinus tenderness. Throat:     Lips, mucosa, and tongue normal.  No Thrush Neck:   Supple, symmetrical,  no adenopathy, thyroid: non tender no carotid bruit and no JVD. Back:     Symmetric,  No CVA tenderness. Lungs:    Clear to auscultation bilaterally. No Wheezing or Rhonchi. No rales. Heart:    Regular rate and rhythm,  no murmur, rub or gallop. Abdomen:    Soft, non-tender. Not distended. Bowel sounds normal. No masses Extremities:  Extremities normal, atraumatic, No cyanosis. No edema. No clubbing Lymph nodes:  Cervical, supraclavicular normal. 
Neurologic:  Normal strength, Alert and oriented X 3. Skin:                No rash Lab Data Reviewed: 
 
Recent Results (from the past 24 hour(s)) METABOLIC PANEL, COMPREHENSIVE Collection Time: 04/30/19  5:30 PM  
Result Value Ref Range Sodium 139 136 - 145 mmol/L Potassium 4.4 3.5 - 5.1 mmol/L Chloride 103 97 - 108 mmol/L  
 CO2 27 21 - 32 mmol/L Anion gap 9 5 - 15 mmol/L Glucose 300 (H) 65 - 100 mg/dL BUN 21 (H) 6 - 20 MG/DL Creatinine 1.26 (H) 0.55 - 1.02 MG/DL  
 BUN/Creatinine ratio 17 12 - 20 GFR est AA 51 (L) >60 ml/min/1.73m2 GFR est non-AA 42 (L) >60 ml/min/1.73m2 Calcium 9.3 8.5 - 10.1 MG/DL Bilirubin, total 0.5 0.2 - 1.0 MG/DL  
 ALT (SGPT) 56 12 - 78 U/L  
 AST (SGOT) 41 (H) 15 - 37 U/L Alk. phosphatase 68 45 - 117 U/L Protein, total 7.1 6.4 - 8.2 g/dL Albumin 3.6 3.5 - 5.0 g/dL Globulin 3.5 2.0 - 4.0 g/dL A-G Ratio 1.0 (L) 1.1 - 2.2    
CBC WITH AUTOMATED DIFF Collection Time: 04/30/19  5:30 PM  
Result Value Ref Range WBC 5.6 3.6 - 11.0 K/uL  
 RBC 4.05 3.80 - 5.20 M/uL  
 HGB 13.6 11.5 - 16.0 g/dL HCT 39.3 35.0 - 47.0 % MCV 97.0 80.0 - 99.0 FL  
 MCH 33.6 26.0 - 34.0 PG  
 MCHC 34.6 30.0 - 36.5 g/dL  
 RDW 13.2 11.5 - 14.5 % PLATELET 161 963 - 436 K/uL MPV 9.8 8.9 - 12.9 FL  
 NRBC 0.0 0  WBC ABSOLUTE NRBC 0.00 0.00 - 0.01 K/uL NEUTROPHILS 81 (H) 32 - 75 % LYMPHOCYTES 12 12 - 49 % MONOCYTES 5 5 - 13 % EOSINOPHILS 1 0 - 7 % BASOPHILS 1 0 - 1 % IMMATURE GRANULOCYTES 0 0.0 - 0.5 % ABS. NEUTROPHILS 4.4 1.8 - 8.0 K/UL  
 ABS. LYMPHOCYTES 0.7 (L) 0.8 - 3.5 K/UL  
 ABS. MONOCYTES 0.3 0.0 - 1.0 K/UL  
 ABS. EOSINOPHILS 0.1 0.0 - 0.4 K/UL  
 ABS. BASOPHILS 0.1 0.0 - 0.1 K/UL  
 ABS. IMM. GRANS. 0.0 0.00 - 0.04 K/UL  
 DF AUTOMATED    
EKG, 12 LEAD, INITIAL Collection Time: 04/30/19  6:06 PM  
Result Value Ref Range Ventricular Rate 86 BPM  
 Atrial Rate 86 BPM  
 P-R Interval 172 ms QRS Duration 94 ms Q-T Interval 396 ms QTC Calculation (Bezet) 473 ms Calculated P Axis 55 degrees Calculated R Axis -25 degrees Calculated T Axis 33 degrees Diagnosis Normal sinus rhythm Inferior infarct , age undetermined Abnormal ECG When compared with ECG of 13-AUG-2018 22:43, 
premature ventricular complexes are no longer present Inferior infarct is now present GLUCOSE, POC Collection Time: 04/30/19  6:14 PM  
Result Value Ref Range Glucose (POC) 273 (H) 65 - 100 mg/dL Performed by Pilar Casper URINALYSIS W/MICROSCOPIC Collection Time: 04/30/19  8:31 PM  
Result Value Ref Range Color YELLOW/STRAW Appearance CLEAR CLEAR Specific gravity 1.024 1.003 - 1.030    
 pH (UA) 7.0 5.0 - 8.0 Protein NEGATIVE  NEG mg/dL Glucose >1,000 (A) NEG mg/dL Ketone NEGATIVE  NEG mg/dL Bilirubin NEGATIVE  NEG Blood NEGATIVE  NEG Urobilinogen 0.2 0.2 - 1.0 EU/dL Nitrites NEGATIVE  NEG Leukocyte Esterase NEGATIVE  NEG    
 WBC 0-4 0 - 4 /hpf  
 RBC 0-5 0 - 5 /hpf Epithelial cells FEW FEW /lpf Bacteria 2+ (A) NEG /hpf Hyaline cast 0-2 0 - 5 /lpf  
GLUCOSE, POC Collection Time: 04/30/19  9:23 PM  
Result Value Ref Range Glucose (POC) 220 (H) 65 - 100 mg/dL Performed by Burt Victoria (PCT) Assessment/Plan:  
 
Principal Problem: 
  Acute bronchitis (4/30/2019) Active Problems: 
  Controlled type 2 diabetes mellitus with stage 2 chronic kidney disease, with long-term current use of insulin ([facility] (4/20/2015) Hypertension with renal disease (8/24/2017) CKD (chronic kidney disease), stage II (8/24/2017) Interstitial lung disease ([facility] (4/9/2019) Non-seasonal allergic rhinitis (4/20/2015) IBS (irritable bowel syndrome) (8/24/2017) 
 
  
___________________________________________________ PLAN: 
 
 
 
 
1. Continue IV Rocephin and Zithromax for acute bronchitis 2. IV Solu-Medrol for acute respiratory failure 3. Scheduled jet nebulizer treatments with DuoNeb 4. Nasal oxygen as needed hypoxemia 5. Tessalon as needed for cough 6. Pulmonary consult 7. Follow blood sugar and treat sliding scale insulin if needed 8. Continue p.m. metformin 9. Continue home dose of Coreg and hydrochlorothiazide for hypertension 10. Continue Wellbutrin and Zoloft as at home with Serax at bedtime 40 minutes spent in direct care of this patient today 
 
 
___________________________________________________ Attending Physician: Aadm Morton MD

## 2019-05-01 NOTE — PROGRESS NOTES
Reason for Readmission:    Acute Bronchitis RRAT Score and Risk Level:   39 Level of Readmission:    High Care Conference scheduled:    
    
Resources/supports as identified by patient/family:    
    
Top Challenges facing patient (as identified by patient/family and CM): Patient didn't identify Finances/Medication cost?     No financial barriers w/affordability of medications were discussed. Transportation   Family to transport at time of discharge. Patient does drive only to medical appointments, and to p/u prescriptions. Support system or lack thereof? Support system consists of patient's son. Living arrangements? Lives alone in a two story home w/four steps to enter into the front door. Self-care/ADLs/Cognition? Pt is independent w/ADL's. Required no assistance. No O2. DME includes (walker, cane, shower seat, handicapped toilets, handrails for the bathtub, and transfer wheelchair. Ramp at the rear of the house. Current Advanced Directive/Advance Care Plan:  FULL code. Pt voiced Advanced Medical Directive is done, and her son is her decision maker. Plan for utilizing home health:   Pt is currently open for [facility] services w/At Home Care. SN/PT/OT. Resumption of Care orders are needed. Prior SNF & SAH placement before. Please Note: pt voiced \"I'm not going back to a SNF\". Likelihood of additional readmission:   High 
          
Transition of Care Plan:    Based on readmission, the patient's previous Plan of Care 
 has been evaluated and/or modified. The current Transition of Care Plan is:        
 
1) Resumption of Care Orders for [facility] will be sent. 2) Pt to follow up w/PCP for post hospital discharge visit. SW to continue to assist.   
 
Care Management Interventions PCP Verified by CM: Yes(April 30th, 2019) Mode of Transport at Discharge: Other (see comment)(Family/friends) Transition of Care Consult (CM Consult): Discharge Planning Discharge Durable Medical Equipment: (DME (walker, cane, shower seat, hand rails for the bathtub, handicapped toilets, transfer wheelchair). No O2.  ) Current Support Network: Own Home, Lives Alone(Lives alone in a two story home w/four steps to enter the front door. ) Confirm Follow Up Transport: Family Plan discussed with Pt/Family/Caregiver: Yes Discharge Location Discharge Placement: (Home) [facility] 
[phone]

## 2019-05-01 NOTE — DIABETES MGMT
DTC Progress Note Recommendations/ Comments: Please consider beginning humalog correction scale normal sensitivity and beginning NPH 8 units Q6hrs linked and timed with solu-medrol. Msg sent to Dr. Yash Steward. Current hospital DM medication: metformin 1000mg with dinner Chart reviewed on Delice Ends. Patient is a 69 y.o. female with known diabetes on Metformin 500 mg BID at home. A1c:  
Lab Results Component Value Date/Time Hemoglobin A1c 7.4 (H) 04/16/2019 09:42 AM  
 Hemoglobin A1c 7.3 (H) 03/20/2019 09:55 AM  
 
 
Recent Glucose Results:  
Lab Results Component Value Date/Time  (H) 05/01/2019 06:07 AM  
  (H) 04/30/2019 05:30 PM  
 GLUCPOC 320 (H) 05/01/2019 11:21 AM  
 GLUCPOC 238 (H) 05/01/2019 08:16 AM  
 GLUCPOC 220 (H) 04/30/2019 09:23 PM  
  
 
Lab Results Component Value Date/Time Creatinine 0.78 05/01/2019 06:07 AM  
 
Estimated Creatinine Clearance: 69 mL/min (based on SCr of 0.78 mg/dL). Active Orders Diet DIET DIABETIC CONSISTENT CARB Regular PO intake:  
Patient Vitals for the past 72 hrs: 
 % Diet Eaten 05/01/19 0830 80 % Will continue to follow as needed. Thank you ABNER MatamorosN, RN, CDE [facility] Time spent: 5 minutes

## 2019-05-02 LAB
ANION GAP SERPL CALC-SCNC: 4 MMOL/L (ref 5–15)
BASOPHILS # BLD: 0 K/UL (ref 0–0.1)
BASOPHILS NFR BLD: 0 % (ref 0–1)
BUN SERPL-MCNC: 23 MG/DL (ref 6–20)
BUN/CREAT SERPL: 26 (ref 12–20)
CALCIUM SERPL-MCNC: 9.2 MG/DL (ref 8.5–10.1)
CHLORIDE SERPL-SCNC: 105 MMOL/L (ref 97–108)
CO2 SERPL-SCNC: 30 MMOL/L (ref 21–32)
CREAT SERPL-MCNC: 0.89 MG/DL (ref 0.55–1.02)
DIFFERENTIAL METHOD BLD: ABNORMAL
EOSINOPHIL # BLD: 0 K/UL (ref 0–0.4)
EOSINOPHIL NFR BLD: 0 % (ref 0–7)
ERYTHROCYTE [DISTWIDTH] IN BLOOD BY AUTOMATED COUNT: 13.2 % (ref 11.5–14.5)
GLUCOSE BLD STRIP.AUTO-MCNC: 263 MG/DL (ref 65–100)
GLUCOSE BLD STRIP.AUTO-MCNC: 265 MG/DL (ref 65–100)
GLUCOSE BLD STRIP.AUTO-MCNC: 384 MG/DL (ref 65–100)
GLUCOSE BLD STRIP.AUTO-MCNC: 396 MG/DL (ref 65–100)
GLUCOSE SERPL-MCNC: 274 MG/DL (ref 65–100)
HCT VFR BLD AUTO: 39.9 % (ref 35–47)
HGB BLD-MCNC: 13.4 G/DL (ref 11.5–16)
IMM GRANULOCYTES # BLD AUTO: 0.1 K/UL (ref 0–0.04)
IMM GRANULOCYTES NFR BLD AUTO: 1 % (ref 0–0.5)
LYMPHOCYTES # BLD: 0.8 K/UL (ref 0.8–3.5)
LYMPHOCYTES NFR BLD: 8 % (ref 12–49)
MCH RBC QN AUTO: 32.9 PG (ref 26–34)
MCHC RBC AUTO-ENTMCNC: 33.6 G/DL (ref 30–36.5)
MCV RBC AUTO: 98 FL (ref 80–99)
MONOCYTES # BLD: 0.3 K/UL (ref 0–1)
MONOCYTES NFR BLD: 3 % (ref 5–13)
NEUTS SEG # BLD: 8.5 K/UL (ref 1.8–8)
NEUTS SEG NFR BLD: 88 % (ref 32–75)
NRBC # BLD: 0 K/UL (ref 0–0.01)
NRBC BLD-RTO: 0 PER 100 WBC
PLATELET # BLD AUTO: 234 K/UL (ref 150–400)
PMV BLD AUTO: 9.9 FL (ref 8.9–12.9)
POTASSIUM SERPL-SCNC: 4.4 MMOL/L (ref 3.5–5.1)
RBC # BLD AUTO: 4.07 M/UL (ref 3.8–5.2)
SERVICE CMNT-IMP: ABNORMAL
SODIUM SERPL-SCNC: 139 MMOL/L (ref 136–145)
WBC # BLD AUTO: 9.7 K/UL (ref 3.6–11)

## 2019-05-02 PROCEDURE — 85025 COMPLETE CBC W/AUTO DIFF WBC: CPT

## 2019-05-02 PROCEDURE — 82962 GLUCOSE BLOOD TEST: CPT

## 2019-05-02 PROCEDURE — 36415 COLL VENOUS BLD VENIPUNCTURE: CPT

## 2019-05-02 PROCEDURE — 74011000258 HC RX REV CODE- 258: Performed by: INTERNAL MEDICINE

## 2019-05-02 PROCEDURE — 74011000250 HC RX REV CODE- 250: Performed by: INTERNAL MEDICINE

## 2019-05-02 PROCEDURE — 80048 BASIC METABOLIC PNL TOTAL CA: CPT

## 2019-05-02 PROCEDURE — 74011250637 HC RX REV CODE- 250/637: Performed by: INTERNAL MEDICINE

## 2019-05-02 PROCEDURE — 74011250636 HC RX REV CODE- 250/636: Performed by: INTERNAL MEDICINE

## 2019-05-02 PROCEDURE — 94640 AIRWAY INHALATION TREATMENT: CPT

## 2019-05-02 PROCEDURE — 94760 N-INVAS EAR/PLS OXIMETRY 1: CPT

## 2019-05-02 PROCEDURE — 65270000015 HC RM PRIVATE ONCOLOGY

## 2019-05-02 RX ADMIN — SERTRALINE HYDROCHLORIDE 100 MG: 50 TABLET ORAL at 08:32

## 2019-05-02 RX ADMIN — METHYLPREDNISOLONE SODIUM SUCCINATE 40 MG: 40 INJECTION, POWDER, FOR SOLUTION INTRAMUSCULAR; INTRAVENOUS at 00:45

## 2019-05-02 RX ADMIN — CLINDAMYCIN PHOSPHATE: 10 GEL TOPICAL at 08:34

## 2019-05-02 RX ADMIN — METHYLPREDNISOLONE SODIUM SUCCINATE 40 MG: 40 INJECTION, POWDER, FOR SOLUTION INTRAMUSCULAR; INTRAVENOUS at 06:17

## 2019-05-02 RX ADMIN — OXAZEPAM 15 MG: 15 CAPSULE, GELATIN COATED ORAL at 22:01

## 2019-05-02 RX ADMIN — IPRATROPIUM BROMIDE AND ALBUTEROL SULFATE 3 ML: .5; 3 SOLUTION RESPIRATORY (INHALATION) at 09:18

## 2019-05-02 RX ADMIN — CARVEDILOL 3.12 MG: 3.12 TABLET, FILM COATED ORAL at 08:32

## 2019-05-02 RX ADMIN — Medication 10 ML: at 22:02

## 2019-05-02 RX ADMIN — BUPROPION HYDROCHLORIDE 150 MG: 150 TABLET, FILM COATED, EXTENDED RELEASE ORAL at 08:33

## 2019-05-02 RX ADMIN — Medication 10 ML: at 17:28

## 2019-05-02 RX ADMIN — CEFTRIAXONE 1 G: 1 INJECTION, POWDER, FOR SOLUTION INTRAMUSCULAR; INTRAVENOUS at 17:29

## 2019-05-02 RX ADMIN — METHYLPREDNISOLONE SODIUM SUCCINATE 20 MG: 40 INJECTION, POWDER, FOR SOLUTION INTRAMUSCULAR; INTRAVENOUS at 17:27

## 2019-05-02 RX ADMIN — METFORMIN HYDROCHLORIDE 1000 MG: 500 TABLET, EXTENDED RELEASE ORAL at 17:26

## 2019-05-02 RX ADMIN — IPRATROPIUM BROMIDE AND ALBUTEROL SULFATE 3 ML: .5; 3 SOLUTION RESPIRATORY (INHALATION) at 15:57

## 2019-05-02 RX ADMIN — IPRATROPIUM BROMIDE AND ALBUTEROL SULFATE 3 ML: .5; 3 SOLUTION RESPIRATORY (INHALATION) at 20:16

## 2019-05-02 RX ADMIN — CLINDAMYCIN PHOSPHATE: 10 GEL TOPICAL at 20:46

## 2019-05-02 RX ADMIN — METHYLPREDNISOLONE SODIUM SUCCINATE 20 MG: 40 INJECTION, POWDER, FOR SOLUTION INTRAMUSCULAR; INTRAVENOUS at 11:52

## 2019-05-02 RX ADMIN — FENOFIBRATE 145 MG: 145 TABLET ORAL at 08:32

## 2019-05-02 RX ADMIN — THERA TABS 1 TABLET: TAB at 08:32

## 2019-05-02 RX ADMIN — PRAVASTATIN SODIUM 80 MG: 40 TABLET ORAL at 22:01

## 2019-05-02 RX ADMIN — AZITHROMYCIN DIHYDRATE 500 MG: 500 INJECTION, POWDER, LYOPHILIZED, FOR SOLUTION INTRAVENOUS at 17:31

## 2019-05-02 RX ADMIN — VITAMIN D, TAB 1000IU (100/BT) 1000 UNITS: 25 TAB at 08:33

## 2019-05-02 RX ADMIN — Medication 10 ML: at 06:17

## 2019-05-02 RX ADMIN — IPRATROPIUM BROMIDE AND ALBUTEROL SULFATE 3 ML: .5; 3 SOLUTION RESPIRATORY (INHALATION) at 12:59

## 2019-05-02 RX ADMIN — HYDROCHLOROTHIAZIDE 25 MG: 25 TABLET ORAL at 08:32

## 2019-05-02 NOTE — PROGRESS NOTES
ADULT PROTOCOL: JET AEROSOL  REASSESSMENT Patient  Evita Baumann     69 y.o.   female     5/1/2019  8:35 PM 
 
Breath Sounds Pre Procedure: Right Breath Sounds: Clear Left Breath Sounds: Clear Breath Sounds Post Procedure: Right Breath Sounds: Clear Left Breath Sounds: Clear Breathing pattern: Pre procedure Breathing Pattern: Regular Post procedure Breathing Pattern: Regular Heart Rate: Pre procedure Pulse: 80 
         Post procedure Pulse: 86 Resp Rate: Pre procedure Respirations: 18 Post procedure Respirations: 18 
 
Oxygen: O2 Device: Room air Changed: NO SpO2: Pre procedure SpO2: 96 %   without oxygen Post procedure SpO2: 97 %  without oxygen Nebulizer Therapy: Current medications Aerosolized Medications: DuoNeb Changed: YES, QID Smoking History: never Problem List:  
Patient Active Problem List  
Diagnosis Code  Controlled type 2 diabetes mellitus with stage 2 chronic kidney disease, with long-term current use of insulin ([facility]) E11.22, N18.2, Z79.4  Non-seasonal allergic rhinitis J30.89  Class 1 obesity due to excess calories without serious comorbidity with body mass index (BMI) of 33.0 to 33.9 in adult E66.09, Z68.33  
 Rosacea L71.9  Mixed hyperlipidemia E78.2  Anxiety F41.9  GI bleed K92.2  Overactive bladder N32.81  
 Polymyalgia rheumatica (HCC) M35.3  
 IBS (irritable bowel syndrome) K58.9  Hypertension with renal disease I12.9  CKD (chronic kidney disease), stage II N18.2  Avascular necrosis of hip ([address] M87.059  Abnormal LFTs R94.5  Annual physical exam Z00.00  Vitamin D deficiency E55.9  Recurrent depression ([address] F33.9  Primary osteoarthritis involving multiple joints M15.0  Sleep disturbance G47.9  Pre-operative cardiovascular examination Z01.810  Lumbar disc disease M51.9  Spinal stenosis, lumbar M48.061  
  Medicare annual wellness visit, subsequent Z00.00  Dyspnea on exertion R06.09  
 Spinal stenosis, cervical region M48.02  
 Cervical stenosis of spine M48.02  
 Interstitial lung disease (HCC) J84.9  Acute bronchitis J20.9 Respiratory Therapist: Lyndsey Rincon RT

## 2019-05-02 NOTE — PROGRESS NOTES
Oncology End of Shift Note Bedside shift change report given to [facility], RN (incoming nurse) by Maicol Denise (outgoing nurse) on [facility] Mg. Report included the following information SBAR, Kardex, Intake/Output, MAR and Recent Results. Shift Summary: no changes overnight. Iv abx hung. Solu medrol q6h. Issues for Physician to Address:  none Patient on Cardiac Monitoring? [] Yes 
[x] No 
 
Rhythm:   
 
 
 
Shift Events See above Maicol Denise

## 2019-05-02 NOTE — PROGRESS NOTES
PROGRESS NOTE 
 
NAME:  Mela Sandoval :   1945 MRN:   [id number] Date/Time:  2019 7:28 AM 
Subjective:  
History:  Chart reviewed and patient seen and examined this AM and D/W her nurse  And Dr. Emerson Ritter (Pulmonary) and all events noted. She presented with  increasing cough and congestion that was getting worse on a daily basis. Kary Tobias was seen by me on  after being hospitalized at [facility] for cervical spine surgery.  While there she was having some problems with shortness of breath and had a CT scan which was read as interstitial lung disease.  When I saw her on the  she was having a significant amount of coughing and shortness of breath and I placed on prednisone and followed up at which time she seemed to be getting a little better but subsequently seemed to be getting worse.  She had also developed a little low-grade fever over the couple days PTA. Kary Tobias did note a heaviness in her chest which was constant that she noted each time she took a deep breath or tried to breathe at all. Her cough has been nonproductive. There are no other cardiorespiratory complaints except for cough, shortness of breath and chest heaviness. She denies any GI or  complaints. She denies any headaches, dizziness or neurologic complaints. There are no other complaints on complete review of systems. She feels better now after treatment with IV antibiotics, JA and IV Solumedrol. Medications reviewed: 
Current Facility-Administered Medications Medication Dose Route Frequency  albuterol-ipratropium (DUO-NEB) 2.5 MG-0.5 MG/3 ML  3 mL Nebulization QID RT  
 benzonatate (TESSALON) capsule 200 mg  200 mg Oral TID PRN  
 buPROPion XL (WELLBUTRIN XL) tablet 150 mg  150 mg Oral DAILY  carvedilol (COREG) tablet 3.125 mg  3.125 mg Oral DAILY  cholecalciferol (VITAMIN D3) tablet 1,000 Units  1,000 Units Oral DAILY  clindamycin (CLINDAGEL) 1 % gel   Topical BID  
  fenofibrate nanocrystallized (TRICOR) tablet 145 mg  145 mg Oral DAILY  hydroCHLOROthiazide (HYDRODIURIL) tablet 25 mg  25 mg Oral DAILY  loratadine-pseudoephedrine (CLARITIN-D 12-hour) 5-120 mg per tablet 1 Tab  1 Tab Oral DAILY PRN  
 metFORMIN ER (GLUCOPHAGE XR) tablet 1,000 mg  1,000 mg Oral DAILY WITH DINNER  therapeutic multivitamin (THERAGRAN) tablet 1 Tab  1 Tab Oral DAILY  oxazepam (SERAX) capsule 15 mg  15 mg Oral QHS PRN  pravastatin (PRAVACHOL) tablet 80 mg  80 mg Oral QHS  sertraline (ZOLOFT) tablet 100 mg  100 mg Oral DAILY  sodium chloride (NS) flush 5-40 mL  5-40 mL IntraVENous Q8H  
 sodium chloride (NS) flush 5-40 mL  5-40 mL IntraVENous PRN  
 zolpidem (AMBIEN) tablet 5 mg  5 mg Oral QHS PRN  
 acetaminophen (TYLENOL) tablet 650 mg  650 mg Oral Q4H PRN  
 enoxaparin (LOVENOX) injection 40 mg  40 mg SubCUTAneous Q24H  cefTRIAXone (ROCEPHIN) 1 g in 0.9% sodium chloride (MBP/ADV) 50 mL  1 g IntraVENous Q24H  
 methylPREDNISolone (PF) (SOLU-MEDROL) injection 40 mg  40 mg IntraVENous Q6H  
 azithromycin (ZITHROMAX) 500 mg in 0.9% sodium chloride (MBP/ADV) 250 mL  500 mg IntraVENous Q24H Objective:  
Vitals: 
Visit Vitals /83 (BP 1 Location: Right arm, BP Patient Position: At rest) Pulse 82 Temp 97.9 °F (36.6 °C) Resp 16 SpO2 99% Breastfeeding? No  
   O2 Device: Room air Temp (24hrs), Av.5 °F (36.9 °C), Min:97.9 °F (36.6 °C), Max:98.8 °F (37.1 °C) Last 24hr Input/Output: 
No intake or output data in the 24 hours ending 19 8805 PHYSICAL EXAM: 
General:     Alert, cooperative, no distress, appears stated age. Head:    Normocephalic, without obvious abnormality, atraumatic. Eyes:    Conjunctivae/corneas clear. PERRLA Nose:   Nares normal. No drainage or sinus tenderness. Throat:     Lips, mucosa, and tongue normal.  No Thrush Neck:   Supple, symmetrical,  no adenopathy, thyroid: non tender no carotid bruit and no JVD. Back:     Symmetric,  No CVA tenderness. Lungs:    Decreased BS bilaterally with slightly prolonged exp and scattered wheezing although improved from yesterday. No Rhonchi. No rales. Heart:    Regular rate and rhythm,  no murmur, rub or gallop. Abdomen:    Soft, non-tender. Not distended. Bowel sounds normal. No masses Extremities:  Extremities normal, atraumatic, No cyanosis. No edema. No clubbing Lymph nodes:  Cervical, supraclavicular normal. 
Neurologic:  Normal strength, Alert and oriented X 3. Skin:                 No rash Lab Data Reviewed: 
 
Recent Results (from the past 24 hour(s)) GLUCOSE, POC Collection Time: 05/01/19  8:16 AM  
Result Value Ref Range Glucose (POC) 238 (H) 65 - 100 mg/dL Performed by [facility] GLUCOSE, POC Collection Time: 05/01/19 11:21 AM  
Result Value Ref Range Glucose (POC) 320 (H) 65 - 100 mg/dL Performed by [facility] GLUCOSE, POC Collection Time: 05/01/19  4:23 PM  
Result Value Ref Range Glucose (POC) 276 (H) 65 - 100 mg/dL Performed by [facility] GLUCOSE, POC Collection Time: 05/01/19  9:05 PM  
Result Value Ref Range Glucose (POC) 363 (H) 65 - 100 mg/dL Performed by [facility] CBC WITH AUTOMATED DIFF Collection Time: 05/02/19  6:17 AM  
Result Value Ref Range WBC 9.7 3.6 - 11.0 K/uL  
 RBC 4.07 3.80 - 5.20 M/uL  
 HGB 13.4 11.5 - 16.0 g/dL HCT 39.9 35.0 - 47.0 % MCV 98.0 80.0 - 99.0 FL  
 MCH 32.9 26.0 - 34.0 PG  
 MCHC 33.6 30.0 - 36.5 g/dL  
 RDW 13.2 11.5 - 14.5 % PLATELET 441 708 - 012 K/uL MPV 9.9 8.9 - 12.9 FL  
 NRBC 0.0 0  WBC ABSOLUTE NRBC 0.00 0.00 - 0.01 K/uL NEUTROPHILS 88 (H) 32 - 75 % LYMPHOCYTES 8 (L) 12 - 49 % MONOCYTES 3 (L) 5 - 13 % EOSINOPHILS 0 0 - 7 % BASOPHILS 0 0 - 1 % IMMATURE GRANULOCYTES 1 (H) 0.0 - 0.5 % ABS. NEUTROPHILS 8.5 (H) 1.8 - 8.0 K/UL ABS. LYMPHOCYTES 0.8 0.8 - 3.5 K/UL  
 ABS. MONOCYTES 0.3 0.0 - 1.0 K/UL  
 ABS. EOSINOPHILS 0.0 0.0 - 0.4 K/UL  
 ABS. BASOPHILS 0.0 0.0 - 0.1 K/UL  
 ABS. IMM. GRANS. 0.1 (H) 0.00 - 0.04 K/UL  
 DF AUTOMATED METABOLIC PANEL, BASIC Collection Time: 05/02/19  6:17 AM  
Result Value Ref Range Sodium 139 136 - 145 mmol/L Potassium 4.4 3.5 - 5.1 mmol/L Chloride 105 97 - 108 mmol/L  
 CO2 30 21 - 32 mmol/L Anion gap 4 (L) 5 - 15 mmol/L Glucose 274 (H) 65 - 100 mg/dL BUN 23 (H) 6 - 20 MG/DL Creatinine 0.89 0.55 - 1.02 MG/DL  
 BUN/Creatinine ratio 26 (H) 12 - 20 GFR est AA >60 >60 ml/min/1.73m2 GFR est non-AA >60 >60 ml/min/1.73m2 Calcium 9.2 8.5 - 10.1 MG/DL Assessment/Plan:  
 
Principal Problem: 
  Acute bronchitis (4/30/2019) Active Problems: 
  Controlled type 2 diabetes mellitus with stage 2 chronic kidney disease, with long-term current use of insulin ([facility] (4/20/2015) Hypertension with renal disease (8/24/2017) CKD (chronic kidney disease), stage II (8/24/2017) Interstitial lung disease ([facility] (4/9/2019) Non-seasonal allergic rhinitis (4/20/2015) IBS (irritable bowel syndrome) (8/24/2017) 
 
  
___________________________________________________ PLAN: 
 
 
 
 
1. Continue IV Rocephin and Zithromax for acute bronchitis 2. IV Solu-Medrol for acute respiratory failure, but taper today to 20 Q 6 
3. Scheduled jet nebulizer treatments with DuoNeb 4. Nasal oxygen as needed hypoxemia 5. Tessalon as needed for cough 6. Pulmonary consult, D/W Dr. Martines Carrier this AM 
7. Follow blood sugar and treat sliding scale insulin if needed 8. Continue p.m. metformin 9. Continue home dose of Coreg and hydrochlorothiazide for hypertension 10. Continue Wellbutrin and Zoloft as at home with Serax at bedtime 
 
 
 
 
 
___________________________________________________ Attending Physician: Chris Diaz MD

## 2019-05-02 NOTE — PROGRESS NOTES
Oncology End of Shift Note Bedside shift change report given to Erickson Mauro RN (incoming nurse) by Disha Bedoya (outgoing nurse) on Rosi Chaidez. Report included the following information SBAR, Kardex and MAR. Shift Summary: Patient is on a lower dose of solumedrol. Patient had a consult with pulmonology. IV antibiotics were hung. Patient did not want her lovenox shot. Patient took a shower. Issues for Physician to Address:  None. Patient on Cardiac Monitoring? [] Yes 
[x] No 
 
Rhythm:   
 
 
 
 
 
Disha Bedoya

## 2019-05-02 NOTE — DIABETES MGMT
DTC Progress Note Recommendations/ Comments: Please consider beginning humalog correction scale normal sensitivity and beginning NPH 5 units Q6hrs linked and timed with solu-medrol. Current hospital DM medication: metformin 1000mg with dinner Chart reviewed on Anisa Melissa. Patient is a 69 y.o. female with known diabetes on Metformin 500 mg BID at home. A1c:  
Lab Results Component Value Date/Time Hemoglobin A1c 7.4 (H) 04/16/2019 09:42 AM  
 Hemoglobin A1c 7.3 (H) 03/20/2019 09:55 AM  
 
 
Recent Glucose Results:  
Lab Results Component Value Date/Time  (H) 05/02/2019 06:17 AM  
 GLUCPOC 384 (H) 05/02/2019 11:24 AM  
 GLUCPOC 265 (H) 05/02/2019 07:50 AM  
 GLUCPOC 363 (H) 05/01/2019 09:05 PM  
  
 
Lab Results Component Value Date/Time Creatinine 0.89 05/02/2019 06:17 AM  
 
Estimated Creatinine Clearance: 60.4 mL/min (based on SCr of 0.89 mg/dL). Active Orders Diet DIET DIABETIC CONSISTENT CARB Regular PO intake:  
Patient Vitals for the past 72 hrs: 
 % Diet Eaten 05/01/19 0830 80 % Will continue to follow as needed. Thank you Isidra Erickson RD, CDE [facility] Office:  [phone] Time spent: 5 minutes

## 2019-05-02 NOTE — CONSULTS
[facility]  Pulmonary, Critical Care, and Sleep Medicine    Initial Patient Consult    Name: Eyad Sharpe MRN: [id number]   : 1945 Hospital: [place] [address]   Date: 2019        IMPRESSION:   · No hypoxemia  · Acute bronchitis  · ILD  · Cough       RECOMMENDATIONS:   · No need for O2  · Jet nebs  · Steroid taper  · Empiric abx  · No value on performing pft's during an acute illness  · Home soon  · To f/u with Dr Laxmi Cleary next week for a complete outpt pulmonary evaluation     Subjective: This patient has been seen and evaluated at the request of Dr. Andreea Fields for ILD. Patient is a 69 y.o. female admitted for acute bronchitis  Started on IV abx, IV steroids, jet nebs  Pt today in no distress, on RA with adequate O2 sats      Past Medical History:   Diagnosis Date    Abnormal LFTs 2017    FATTY LIVER    Arthritis     OSTEO    Avascular necrosis of hip ([address] 2017    BILAT HIPS    Breast CA ([address] ,     BILATERAL; SURGERY, CHEMO    Chronic pain     CKD (chronic kidney disease), stage II 2017    Coagulation disorder ([address]     ITP  (DR CHU BRADSHAW) - PLATELETS DROPPED TO 5K    Depression     Diabetes ([address]     TYPE 2; NIDDM    DJD (degenerative joint disease), multiple sites 2017    GI bleed     HEMORRHOIDS    Hyperlipemia     Hypertension with renal disease 2017    IBS (irritable bowel syndrome) 2017    Ill-defined condition     PNEUMONIA X2 - HOSPITALIZED IN     Interstitial lung disease ([address] 2019    Liver disease     FATTY LIVER    Myalgia 2017    On statin therapy 2017    Overactive bladder 2017    Pneumonia 2015    HOSPITALIZED 3 WEEKS.     Polymyalgia rheumatica ([address] 2017    Prophylactic antibiotic 2017    Rosacea       Past Surgical History:   Procedure Laterality Date    BREAST SURGERY PROCEDURE UNLISTED  ,     RECONSTRUCTION X2    HX APPENDECTOMY  1950    HX BREAST BIOPSY Bilateral     HX CATARACT REMOVAL Bilateral     HX COLONOSCOPY      HX ENDOSCOPY      HX GI      COLONOSCOPY    HX HEENT      WISDOM TEETH    HX HYSTERECTOMY  2000S    HX MASTECTOMY Right 1989    HX MASTECTOMY Left 2001    HX ORTHOPAEDIC  2008    LUMBAR FUSION    HX ORTHOPAEDIC  2018    RE-DO LUMBAR FUSION, AND ADDED THORACIC FUSION    HX ORTHOPAEDIC  03/26/2019    neckectomy    HX TUBAL LIGATION  1981    HX UROLOGICAL  2000s    BLADDER SLING    TOTAL HIP ARTHROPLASTY Left 11/16/2016    ANTERIOR APPROACH, DR Gwendolyn De La Torre; (POSTOP: STANDS ONE INCH TALLER ON LEFT FOOT)    TOTAL HIP ARTHROPLASTY Right 12/2016    ANTERIOR APPROACH (DR Gwendolyn De La Torre)      Prior to Admission medications    Medication Sig Start Date End Date Taking? Authorizing Provider   benzonatate (TESSALON) 200 mg capsule TAKE 1 CAPSULE THREE TIMES DAILY IF NEEDED FOR COUGH 4/30/19   Lesia Hester MD   cholecalciferol (VITAMIN D3) 1,000 unit cap Take  by mouth daily. Indications: unknown of the mg. Provider, Historical   carvedilol (COREG) 6.25 mg tablet Take 0.5 Tabs by mouth daily. 4/24/19   Lesia Hester MD   metFORMIN ER (GLUCOPHAGE XR) 500 mg tablet Take one tablet in the morning with breakfast and two tablets in the evening with dinner. 4/17/19   Lesia Hester MD   predniSONE (DELTASONE) 20 mg tablet Take 20 mg by mouth daily (with breakfast). Patient taking differently: Take 10 mg by mouth daily (with breakfast). Indications: 10mg 4/16/19   Lesia Hester MD   biotin-silicon diox-L-cysteine 3,000 mcg -100 mg-50 mg TbER Take 1 Tab by mouth daily. Provider, Historical   glucose blood VI test strips (ACCU-CHEK CHERYL) strip Use once daily 2/11/19   Lesia Hester MD   hydroCHLOROthiazide (HYDRODIURIL) 25 mg tablet TAKE 1 TABLET EVERY DAY 1/18/19   Lesia Hester MD   fenofibrate nanocrystallized (TRICOR) 145 mg tablet Take 1 Tab by mouth daily.  1/15/19   Juan Daniel Escalera Lidya Ramirez MD   oxazepam (SERAX) 15 mg capsule TAKE 2 CAPSULES AT BEDTIME 11/21/18   Jolly Kehr, MD   loratadine-pseudoephedrine (CLARITIN-D 12 HOUR) 5-120 mg per tablet Take 1 Tab by mouth daily as needed. Provider, Historical   levocarnitine HCl (ACETYL-L-CARNITINE) Take 500 mg by mouth daily. Provider, Historical   buPROPion XL (WELLBUTRIN XL) 150 mg tablet TAKE 1 TABLET BY MOUTH EVERY DAY  Patient taking differently: Take 150 mg by mouth daily. TAKE 1 TABLET BY MOUTH EVERY DAY 7/6/18   Jolly Kehr, MD   sertraline (ZOLOFT) 100 mg tablet TAKE 1 TABLET BY MOUTH DAILY  Patient taking differently: Take 100 mg by mouth daily. TAKE 1 TABLET BY MOUTH DAILY 7/6/18   Jolly Kehr, MD   pravastatin (PRAVACHOL) 80 mg tablet Take 1 Tab by mouth nightly. 1/22/18   Jolly Kehr, MD   multivitamin (ONE A DAY) tablet Take 1 Tab by mouth daily. Provider, Historical   azelaic acid (FINACEA) 15 % topical gel Apply  to affected area two (2) times a day. Provider, Historical   clindamycin (CLINDAGEL) 1 % topical gel Apply  to affected area two (2) times a day.  use thin film on affected area    Provider, Historical     Allergies   Allergen Reactions    Statins-Hmg-Coa Reductase Inhibitors Myalgia     Myalgia with  multiple statins  Takes pravachol     Codeine Rash     Rash on thighs    Darvon [Propoxyphene] Other (comments)     \"I see worms\"    Pcn [Penicillins] Rash    Sulfa (Sulfonamide Antibiotics) Rash      Social History     Tobacco Use    Smoking status: Never Smoker    Smokeless tobacco: Never Used   Substance Use Topics    Alcohol use: No      Family History   Problem Relation Age of Onset    Heart Disease Mother     Kidney Disease Mother     Lung Disease Mother         COPD    Diabetes Brother     Pacemaker Brother     Kidney Disease Brother     Hypertension Brother     Anesth Problems Neg Hx         Current Facility-Administered Medications   Medication Dose Route Frequency    methylPREDNISolone (PF) (SOLU-MEDROL) injection 20 mg  20 mg IntraVENous Q6H    albuterol-ipratropium (DUO-NEB) 2.5 MG-0.5 MG/3 ML  3 mL Nebulization QID RT    buPROPion XL (WELLBUTRIN XL) tablet 150 mg  150 mg Oral DAILY    carvedilol (COREG) tablet 3.125 mg  3.125 mg Oral DAILY    cholecalciferol (VITAMIN D3) tablet 1,000 Units  1,000 Units Oral DAILY    clindamycin (CLINDAGEL) 1 % gel   Topical BID    fenofibrate nanocrystallized (TRICOR) tablet 145 mg  145 mg Oral DAILY    hydroCHLOROthiazide (HYDRODIURIL) tablet 25 mg  25 mg Oral DAILY    metFORMIN ER (GLUCOPHAGE XR) tablet 1,000 mg  1,000 mg Oral DAILY WITH DINNER    therapeutic multivitamin (THERAGRAN) tablet 1 Tab  1 Tab Oral DAILY    pravastatin (PRAVACHOL) tablet 80 mg  80 mg Oral QHS    sertraline (ZOLOFT) tablet 100 mg  100 mg Oral DAILY    sodium chloride (NS) flush 5-40 mL  5-40 mL IntraVENous Q8H    enoxaparin (LOVENOX) injection 40 mg  40 mg SubCUTAneous Q24H    cefTRIAXone (ROCEPHIN) 1 g in 0.9% sodium chloride (MBP/ADV) 50 mL  1 g IntraVENous Q24H    azithromycin (ZITHROMAX) 500 mg in 0.9% sodium chloride (MBP/ADV) 250 mL  500 mg IntraVENous Q24H       Review of Systems:  A comprehensive review of systems was negative except for: Respiratory: positive for cough    Objective:   Vital Signs:    Visit Vitals  /73 (BP 1 Location: Right arm, BP Patient Position: At rest)   Pulse 73   Temp 98.5 °F (36.9 °C)   Resp 18   SpO2 95%   Breastfeeding? No       O2 Device: Room air       Temp (24hrs), Av.5 °F (36.9 °C), Min:97.9 °F (36.6 °C), Max:98.8 °F (37.1 °C)       Intake/Output:   Last shift:      No intake/output data recorded. Last 3 shifts: No intake/output data recorded. No intake or output data in the 24 hours ending 19 1523   Physical Exam:   General:  Alert, cooperative, no distress, appears stated age. Head:  Normocephalic, without obvious abnormality, atraumatic. Eyes:  Conjunctivae/corneas clear. PERRL, EOMs intact. Nose: Nares normal. Septum midline. Mucosa normal. No drainage or sinus tenderness. Throat: Lips, mucosa, and tongue normal. Teeth and gums normal.   Neck: Supple, symmetrical, trachea midline, no adenopathy, thyroid: no enlargment/tenderness/nodules, no carotid bruit and no JVD. Back:   Symmetric, no curvature. ROM normal.   Lungs:   Clear to auscultation bilaterally. Chest wall:  No tenderness or deformity. Heart:  Regular rate and rhythm, S1, S2 normal, no murmur, click, rub or gallop. Abdomen:   Soft, non-tender. Bowel sounds normal. No masses,  No organomegaly. Extremities: Extremities normal, atraumatic, no cyanosis or edema. Pulses: 2+ and symmetric all extremities. Skin: Skin color, texture, turgor normal. No rashes or lesions   Lymph nodes: Cervical, supraclavicular, and axillary nodes normal.   Neurologic: Grossly nonfocal     Data review:     Recent Results (from the past 24 hour(s))   GLUCOSE, POC    Collection Time: 05/01/19  4:23 PM   Result Value Ref Range    Glucose (POC) 276 (H) 65 - 100 mg/dL    Performed by Riki Rubi (PCT)    GLUCOSE, POC    Collection Time: 05/01/19  9:05 PM   Result Value Ref Range    Glucose (POC) 363 (H) 65 - 100 mg/dL    Performed by Malissa Mole    CBC WITH AUTOMATED DIFF    Collection Time: 05/02/19  6:17 AM   Result Value Ref Range    WBC 9.7 3.6 - 11.0 K/uL    RBC 4.07 3.80 - 5.20 M/uL    HGB 13.4 11.5 - 16.0 g/dL    HCT 39.9 35.0 - 47.0 %    MCV 98.0 80.0 - 99.0 FL    MCH 32.9 26.0 - 34.0 PG    MCHC 33.6 30.0 - 36.5 g/dL    RDW 13.2 11.5 - 14.5 %    PLATELET 027 455 - 690 K/uL    MPV 9.9 8.9 - 12.9 FL    NRBC 0.0 0  WBC    ABSOLUTE NRBC 0.00 0.00 - 0.01 K/uL    NEUTROPHILS 88 (H) 32 - 75 %    LYMPHOCYTES 8 (L) 12 - 49 %    MONOCYTES 3 (L) 5 - 13 %    EOSINOPHILS 0 0 - 7 %    BASOPHILS 0 0 - 1 %    IMMATURE GRANULOCYTES 1 (H) 0.0 - 0.5 %    ABS. NEUTROPHILS 8.5 (H) 1.8 - 8.0 K/UL    ABS.  LYMPHOCYTES 0.8 0.8 - 3.5 K/UL    ABS. MONOCYTES 0.3 0.0 - 1.0 K/UL    ABS. EOSINOPHILS 0.0 0.0 - 0.4 K/UL    ABS. BASOPHILS 0.0 0.0 - 0.1 K/UL    ABS. IMM.  GRANS. 0.1 (H) 0.00 - 0.04 K/UL    DF AUTOMATED     METABOLIC PANEL, BASIC    Collection Time: 05/02/19  6:17 AM   Result Value Ref Range    Sodium 139 136 - 145 mmol/L    Potassium 4.4 3.5 - 5.1 mmol/L    Chloride 105 97 - 108 mmol/L    CO2 30 21 - 32 mmol/L    Anion gap 4 (L) 5 - 15 mmol/L    Glucose 274 (H) 65 - 100 mg/dL    BUN 23 (H) 6 - 20 MG/DL    Creatinine 0.89 0.55 - 1.02 MG/DL    BUN/Creatinine ratio 26 (H) 12 - 20      GFR est AA >60 >60 ml/min/1.73m2    GFR est non-AA >60 >60 ml/min/1.73m2    Calcium 9.2 8.5 - 10.1 MG/DL   GLUCOSE, POC    Collection Time: 05/02/19  7:50 AM   Result Value Ref Range    Glucose (POC) 265 (H) 65 - 100 mg/dL    Performed by Lise Borja    GLUCOSE, POC    Collection Time: 05/02/19 11:24 AM   Result Value Ref Range    Glucose (POC) 384 (H) 65 - 100 mg/dL    Performed by Tip Choi        Imaging:  I have personally reviewed the patients radiographs and have reviewed the reports:  CXR: clear  Chest ct reviewed        Bernie Barbosa MD

## 2019-05-03 VITALS
DIASTOLIC BLOOD PRESSURE: 89 MMHG | TEMPERATURE: 97.9 F | RESPIRATION RATE: 18 BRPM | SYSTOLIC BLOOD PRESSURE: 145 MMHG | OXYGEN SATURATION: 98 % | HEART RATE: 64 BPM

## 2019-05-03 LAB
ANION GAP SERPL CALC-SCNC: 6 MMOL/L (ref 5–15)
BUN SERPL-MCNC: 26 MG/DL (ref 6–20)
BUN/CREAT SERPL: 33 (ref 12–20)
CALCIUM SERPL-MCNC: 9.2 MG/DL (ref 8.5–10.1)
CHLORIDE SERPL-SCNC: 104 MMOL/L (ref 97–108)
CO2 SERPL-SCNC: 29 MMOL/L (ref 21–32)
CREAT SERPL-MCNC: 0.79 MG/DL (ref 0.55–1.02)
GLUCOSE BLD STRIP.AUTO-MCNC: 219 MG/DL (ref 65–100)
GLUCOSE SERPL-MCNC: 221 MG/DL (ref 65–100)
POTASSIUM SERPL-SCNC: 4.4 MMOL/L (ref 3.5–5.1)
SERVICE CMNT-IMP: ABNORMAL
SODIUM SERPL-SCNC: 139 MMOL/L (ref 136–145)

## 2019-05-03 PROCEDURE — 74011250637 HC RX REV CODE- 250/637: Performed by: INTERNAL MEDICINE

## 2019-05-03 PROCEDURE — 94760 N-INVAS EAR/PLS OXIMETRY 1: CPT

## 2019-05-03 PROCEDURE — 94640 AIRWAY INHALATION TREATMENT: CPT

## 2019-05-03 PROCEDURE — 36415 COLL VENOUS BLD VENIPUNCTURE: CPT

## 2019-05-03 PROCEDURE — 74011250636 HC RX REV CODE- 250/636: Performed by: INTERNAL MEDICINE

## 2019-05-03 PROCEDURE — 82962 GLUCOSE BLOOD TEST: CPT

## 2019-05-03 PROCEDURE — 80048 BASIC METABOLIC PNL TOTAL CA: CPT

## 2019-05-03 PROCEDURE — 74011000250 HC RX REV CODE- 250: Performed by: INTERNAL MEDICINE

## 2019-05-03 RX ORDER — ALBUTEROL SULFATE 90 UG/1
1 AEROSOL, METERED RESPIRATORY (INHALATION)
Qty: 1 INHALER | Status: SHIPPED | OUTPATIENT
Start: 2019-05-03 | End: 2020-03-18 | Stop reason: ALTCHOICE

## 2019-05-03 RX ORDER — PREDNISONE 20 MG/1
20 TABLET ORAL
Qty: 30 TAB | Refills: 0 | Status: SHIPPED | OUTPATIENT
Start: 2019-05-03 | End: 2019-07-24 | Stop reason: ALTCHOICE

## 2019-05-03 RX ORDER — CEFUROXIME AXETIL 250 MG/1
250 TABLET ORAL 2 TIMES DAILY
Qty: 14 TAB | Refills: 0 | Status: SHIPPED | OUTPATIENT
Start: 2019-05-03 | End: 2019-06-07 | Stop reason: ALTCHOICE

## 2019-05-03 RX ADMIN — BUPROPION HYDROCHLORIDE 150 MG: 150 TABLET, FILM COATED, EXTENDED RELEASE ORAL at 08:58

## 2019-05-03 RX ADMIN — IPRATROPIUM BROMIDE AND ALBUTEROL SULFATE 3 ML: .5; 3 SOLUTION RESPIRATORY (INHALATION) at 08:23

## 2019-05-03 RX ADMIN — SERTRALINE HYDROCHLORIDE 100 MG: 50 TABLET ORAL at 08:57

## 2019-05-03 RX ADMIN — HYDROCHLOROTHIAZIDE 25 MG: 25 TABLET ORAL at 08:57

## 2019-05-03 RX ADMIN — FENOFIBRATE 145 MG: 145 TABLET ORAL at 08:57

## 2019-05-03 RX ADMIN — METHYLPREDNISOLONE SODIUM SUCCINATE 20 MG: 40 INJECTION, POWDER, FOR SOLUTION INTRAMUSCULAR; INTRAVENOUS at 00:51

## 2019-05-03 RX ADMIN — METHYLPREDNISOLONE SODIUM SUCCINATE 20 MG: 40 INJECTION, POWDER, FOR SOLUTION INTRAMUSCULAR; INTRAVENOUS at 06:32

## 2019-05-03 RX ADMIN — THERA TABS 1 TABLET: TAB at 08:57

## 2019-05-03 RX ADMIN — VITAMIN D, TAB 1000IU (100/BT) 1000 UNITS: 25 TAB at 08:57

## 2019-05-03 RX ADMIN — CARVEDILOL 3.12 MG: 3.12 TABLET, FILM COATED ORAL at 08:58

## 2019-05-03 NOTE — PROGRESS NOTES
I have reviewed discharge instructions with the patient. The patient verbalized understanding. Discharge medications reviewed with patient and appropriate educational materials and side effects teaching were provided. Follow-up appointments reviewed. Opportunity for questions and clarification was provided. Venous access removed without difficulty. Patient's belongings gathered and sent with patient. Patient is ready for discharge. Volunteer brought wheelchair to wheel patient to her vehicle. Aki Duran

## 2019-05-03 NOTE — PROGRESS NOTES
ADULT PROTOCOL: JET AEROSOL  REASSESSMENT Patient  Sana Conklin     69 y.o.   female     5/2/2019  8:30 PM 
 
Breath Sounds Pre Procedure: Right Breath Sounds: Clear Left Breath Sounds: Clear Breath Sounds Post Procedure: Right Breath Sounds: Clear Left Breath Sounds: Clear Breathing pattern: Pre procedure Breathing Pattern: Regular Post procedure Breathing Pattern: Regular Heart Rate: Pre procedure Pulse: 103 Post procedure Pulse: 80 Resp Rate: Pre procedure Respirations: 20 
         Post procedure Respirations: 20 Peak Flow: Pre bronchodilator Post bronchodilator FVC/FEV1:  n/a Incentive Spirometry:    
     
 
Cough: Pre procedure Cough: Non-productive Post procedure Cough: Non-productive Suctioned: NO Sputum: Pre procedure Post procedure Oxygen: O2 Device: Room air   RA Changed: NO SpO2: Pre procedure SpO2: 98 %   without oxygen Post procedure SpO2: 96 %  without oxygen Nebulizer Therapy: Current medications Aerosolized Medications: DuoNeb Changed: NO Smoking History: n/a Problem List:  
Patient Active Problem List  
Diagnosis Code  Controlled type 2 diabetes mellitus with stage 2 chronic kidney disease, with long-term current use of insulin ([facility]) E11.22, N18.2, Z79.4  Non-seasonal allergic rhinitis J30.89  Class 1 obesity due to excess calories without serious comorbidity with body mass index (BMI) of 33.0 to 33.9 in adult E66.09, Z68.33  
 Rosacea L71.9  Mixed hyperlipidemia E78.2  Anxiety F41.9  GI bleed K92.2  Overactive bladder N32.81  
 Polymyalgia rheumatica (HCC) M35.3  
 IBS (irritable bowel syndrome) K58.9  Hypertension with renal disease I12.9  CKD (chronic kidney disease), stage II N18.2  Avascular necrosis of hip ([facility] [address] M87.059  Abnormal LFTs R94.5  Annual physical exam Z00.00  Vitamin D deficiency E55.9  Recurrent depression ([address] F33.9  Primary osteoarthritis involving multiple joints M15.0  Sleep disturbance G47.9  Pre-operative cardiovascular examination Z01.810  Lumbar disc disease M51.9  Spinal stenosis, lumbar M48.061  Medicare annual wellness visit, subsequent Z00.00  Dyspnea on exertion R06.09  
 Spinal stenosis, cervical region M48.02  
 Cervical stenosis of spine M48.02  
 Interstitial lung disease (HCC) J84.9  Acute bronchitis J20.9 Respiratory Therapist: Edelmira Goldsmith RT

## 2019-05-03 NOTE — PROGRESS NOTES
Oncology End of Shift Note Bedside shift change report given to [facility], RN (incoming nurse) by Roya Bennett (outgoing nurse) on Annce Sa. Report included the following information SBAR, Kardex, Intake/Output, MAR and Recent Results. Shift Summary: no issues overnight. Labs drawn, steroids tapered down to 20 mg IV. Hoping to d/c today Issues for Physician to Address:  Continue steroid taper, d/c? Patient on Cardiac Monitoring? [] Yes 
[x] No 
 
Rhythm:   
 
 
 
Shift Events See above Roya Bennett

## 2019-05-03 NOTE — DISCHARGE INSTRUCTIONS
Doctor Irene [address]  [phone]      Patient Discharge Instructions    Evita Baumann / [id number] : 1945    Admitted 2019 Discharged: 5/3/2019     Principal Problem:    Acute bronchitis (2019)    Active Problems:    Controlled type 2 diabetes mellitus with stage 2 chronic kidney disease, with long-term current use of insulin ([facility] [address] (2015)      Hypertension with renal disease (2017)      CKD (chronic kidney disease), stage II (2017)      Interstitial lung disease ([facility] [address] (2019)      Non-seasonal allergic rhinitis (2015)      IBS (irritable bowel syndrome) (2017)          Allergies   Allergen Reactions    Statins-Hmg-Coa Reductase Inhibitors Myalgia     Myalgia with  multiple statins  Takes pravachol     Codeine Rash     Rash on thighs    Darvon [Propoxyphene] Other (comments)     \"I see worms\"    Pcn [Penicillins] Rash    Sulfa (Sulfonamide Antibiotics) Rash       · It is important that you take the medication exactly as they are prescribed. · Do not take other medications without consulting your doctor. What to do at Next Level of Care    Disposition:  Home    Recommended diet: Diabetic    Recommended activity: Usual          Information obtained by :  I understand that if any problems occur once I am at home I am to contact my physician. I understand and acknowledge receipt of the instructions indicated above.                                                                                                                                            Physician's or R.N.'s Signature                                                                  Date/Time                                                                                                                                              Patient or Representative Signature Date/Time

## 2019-05-03 NOTE — DIABETES MGMT
DTC Progress Note Recommendations/ Comments: Please consider beginning humalog correction scale normal sensitivity and beginning NPH 5 units Q6hrs linked and timed with solu-medrol. Current hospital DM medication: metformin 1000mg with dinner Chart reviewed on Mira Whitmore. Patient is a 69 y.o. female with known diabetes on Metformin 500 mg BID at home. A1c:  
Lab Results Component Value Date/Time Hemoglobin A1c 7.4 (H) 04/16/2019 09:42 AM  
 Hemoglobin A1c 7.3 (H) 03/20/2019 09:55 AM  
 
 
Recent Glucose Results:  
Lab Results Component Value Date/Time  (H) 05/03/2019 03:24 AM  
 GLUCPOC 219 (H) 05/03/2019 07:25 AM  
 GLUCPOC 396 (H) 05/02/2019 08:35 PM  
 GLUCPOC 263 (H) 05/02/2019 04:33 PM  
  
 
Lab Results Component Value Date/Time Creatinine 0.79 05/03/2019 03:24 AM  
 
CrCl cannot be calculated (Unknown ideal weight. ). Active Orders Diet DIET DIABETIC CONSISTENT CARB Regular PO intake:  
Patient Vitals for the past 72 hrs: 
 % Diet Eaten 05/01/19 0830 80 % Will continue to follow as needed. Thank you Aurelio La, ABNERN, RN, CDE [facility] Time spent: 5 minutes

## 2019-05-04 NOTE — DISCHARGE SUMMARY
[address] SUMMARY    Name:  Allan Valdez  MR#:  [id number]  :  1945  ACCOUNT #:  [de-identified]  ADMIT DATE:  2019  DISCHARGE DATE:  2019      FINAL DIAGNOSES:  1. Acute bronchitis. 2.  Interstitial lung disease. 3.  Diabetes mellitus. 4.  Hypertension. 5.  Chronic kidney disease stage II. 6.  Allergic rhinitis. 7.  Irritable bowel syndrome. CONSULTATIONS:  Pulmonary, Dr. Seamus Lindquist. PROCEDURES:  None. For details of admission, please see admit note. HOSPITAL COURSE:  Briefly, the patient is a 70-year-old white female who was recently diagnosed with interstitial lung disease via CT scan, which was done at [facility], when she was hospitalized after neck surgery in early April. She was placed on prednisone to help with her symptoms and initially seemed to improve with that; however, she subsequently developed increased respiratory distress with increase cough and congestion and difficulty breathing and she presented to the office markedly short of breath on the day of admission with extreme amount of cough and marked wheezing and it was felt prudent to admit her to the hospital for more aggressive therapy of acute bronchitis on top of interstitial lung disease. She was admitted to the hospital and started on IV Solu-Medrol as well as inhaled jet nebulizer treatments around the clock. She was started on IV antibiotics with Zithromax as well as Rocephin and with the above treatment she had marked improvement of respiratory status. It was felt at this point, the maximal hospital benefit has been achieved and she could be further treated as an outpatient. She was seen in consultation by Dr. Seamus Lindquist for evaluation of the interstitial lung disease and she will have follow up with Dr. Miguel Chase next week as an outpatient for further definitive workup of her interstitial lung disease.   She will be discharged to home on tapering prednisone, currently at 20 mg daily. An additional 7 days of Ceftin 250 mg b.i.d. She has completed a course of Zithromax. She will be given a ProAir inhaler 2 puffs q.i.d. p.r.n. and her other medications will be the same as per admission, which consist of Finacea 15% topical gel b.i.d. p.r.n., 1 tablet daily, acetyl-L-carnitine 500 mg by mouth daily, TriCor 145 daily, Pravachol 80 mg daily, Tessalon 200 mg t.i.d. p.r.n., Wellbutrin  mg daily, Coreg 6.25 one half tablet daily, vitamin D3 1000 units daily, Clindagel 1% topical gel for rosacea b.i.d., Hydrodiuril 25 mg daily, metformin  mg in the morning and 1000 mg in the evening, Claritin-D one tablet by mouth daily as needed, multivitamin 1 daily, Serax 15 mg 2 tablets at bedtime, Zoloft 100 mg daily. She will have follow up by me in the office in about 4 days and have follow up with Dr. Anselmo Olivares next week.         Jo Ann Vaz MD      [id number]  D:  05/03/2019 7:21  T:  05/03/2019 8:04  JOB #:  [id number]  CC:  Rd Lemus MD

## 2019-05-06 NOTE — PROGRESS NOTES
Transitions Of Care [facility], [facility] 4/30/19 d/c 5/3/19) HPI: 
rCiss De is a 69y.o. year old female who is here for a follow up visit for hospitalization transition of care. She was last seen by me on 4/30/2019 at the office and at the hospital on 5/3/2019. Discharged on: 5/3/2019 Diagnosis in hospital: 1. Acute bronchitis 2. Interstitial lung disease 3. Diabetes 4. Hypertension 5. CKD stage II 
6. Allergic rhinitis 7. IBS Complications in hospital: No complications Medication changes: Prednisone dose increased to 20 mg daily to taper, Ceftin 250 twice daily for an additional 7 days, given a pro-air inhaler 2 puffs 4 times daily as needed. Other medicines the same as prior to admission Discharge Summary reviewed. Today 5/7/2019 She reports the following: Since discharge home she has been doing fairly well although still some shortness of breath and dyspnea on exertion she is certainly better than when. She is noting less cough. She denies any fevers, chills or night sweats. Other than the dyspnea on exertion and some mild residual cough she denies any chest pain, palpitations, PND, orthopnea or other cardiorespiratory complaints. She has no GI or  complaints although she notes that sometimes she is scared to swallow because she has a hard time swallowing food. She denies any headaches, dizziness or neurologic complaints. She has no other complaints on complete review of systems. Visit Vitals /86 (BP 1 Location: Right arm, BP Patient Position: Sitting) Pulse (!) 103 Temp 97.5 °F (36.4 °C) (Oral) Resp 19 Ht 5' 4\" (1.626 m) Wt 186 lb 12.8 oz (84.7 kg) SpO2 96% BMI 32.06 kg/m² Historical Data Past Medical History:  
Diagnosis Date  Abnormal LFTs 08/24/2017 FATTY LIVER  Arthritis OSTEO  
 Avascular necrosis of hip ([facility] [address] 08/24/2017 BILAT HIPS  Breast CA ([facility] [address] 1989, 2001 BILATERAL; SURGERY, CHEMO  Chronic pain  CKD (chronic kidney disease), stage II 8/24/2017  Coagulation disorder ([facility] [address] 1984 ITP  (DR CHU BRADSHAW) - PLATELETS DROPPED TO 5K  Depression  Diabetes ([facility] [address] TYPE 2; NIDDM  DJD (degenerative joint disease), multiple sites 8/24/2017  GI bleed 2008 HEMORRHOIDS  Hyperlipemia  Hypertension with renal disease 8/24/2017  IBS (irritable bowel syndrome) 8/24/2017  Ill-defined condition 2015 PNEUMONIA X2 - HOSPITALIZED IN 2015  Interstitial lung disease ([facility] 04/01/2019  Liver disease FATTY LIVER  
 Myalgia 8/24/2017  On statin therapy 8/24/2017  Overactive bladder 8/24/2017  Pneumonia 04/2015 HOSPITALIZED 3 WEEKS.  Polymyalgia rheumatica ([facility] [address] 8/24/2017  Prophylactic antibiotic 8/24/2017  Rosacea Past Surgical History:  
Procedure Laterality Date [place], 2002 [address]  HX BREAST BIOPSY Bilateral   
 HX CATARACT REMOVAL Bilateral   
 HX COLONOSCOPY    
 HX ENDOSCOPY    
 HX GI    
 COLONOSCOPY  
 HX HEENT    
 WISDOM TEETH  
 HX HYSTERECTOMY  2000S  
 HX MASTECTOMY Right 1989  HX MASTECTOMY Left 2001 Miguel Sindhu ORTHOPAEDIC  2008 LUMBAR FUSION  
 HX ORTHOPAEDIC  2018 RE-DO LUMBAR FUSION, AND ADDED THORACIC FUSION  
 HX ORTHOPAEDIC  03/26/2019  
 neckectomy [address]  HX UROLOGICAL  2000s BLADDER SLING  
 TOTAL HIP ARTHROPLASTY Left 11/16/2016 ANTERIOR APPROACH, DR Gwendolyn De La Torre; (POSTOP: STANDS ONE INCH TALLER ON LEFT FOOT)  TOTAL HIP ARTHROPLASTY Right 12/2016 ANTERIOR APPROACH (DR Gwendolyn De La Torre) Outpatient Encounter Medications as of 5/7/2019 Medication Sig Dispense Refill  predniSONE (DELTASONE) 20 mg tablet Take 20 mg by mouth daily (with breakfast). 30 Tab 0  
 cefUROXime (CEFTIN) 250 mg tablet Take 1 Tab by mouth two (2) times a day.  14 Tab 0  
 albuterol (PROAIR HFA) 90 mcg/actuation inhaler Take 1 Puff by inhalation every four (4) hours as needed for Wheezing or Shortness of Breath. 1 Inhaler prn  benzonatate (TESSALON) 200 mg capsule TAKE 1 CAPSULE THREE TIMES DAILY IF NEEDED FOR COUGH 30 Cap 0  cholecalciferol (VITAMIN D3) 1,000 unit cap Take  by mouth daily. Indications: unknown of the mg.  carvedilol (COREG) 6.25 mg tablet Take 0.5 Tabs by mouth daily. 45 Tab 5  
 metFORMIN ER (GLUCOPHAGE XR) 500 mg tablet Take one tablet in the morning with breakfast and two tablets in the evening with dinner. 270 Tab 3  
 biotin-silicon diox-L-cysteine 3,000 mcg -100 mg-50 mg TbER Take 1 Tab by mouth daily.  glucose blood VI test strips (ACCU-CHEK CHERYL) strip Use once daily 50 Strip prn  hydroCHLOROthiazide (HYDRODIURIL) 25 mg tablet TAKE 1 TABLET EVERY DAY 90 Tab 3  
 fenofibrate nanocrystallized (TRICOR) 145 mg tablet Take 1 Tab by mouth daily. 30 Tab 11  
 oxazepam (SERAX) 15 mg capsule TAKE 2 CAPSULES AT BEDTIME 60 Cap 2  
 loratadine-pseudoephedrine (CLARITIN-D 12 HOUR) 5-120 mg per tablet Take 1 Tab by mouth daily as needed.  levocarnitine HCl (ACETYL-L-CARNITINE) Take 500 mg by mouth daily.  buPROPion XL (WELLBUTRIN XL) 150 mg tablet TAKE 1 TABLET BY MOUTH EVERY DAY (Patient taking differently: Take 150 mg by mouth daily. TAKE 1 TABLET BY MOUTH EVERY DAY) 90 Tab 3  
 sertraline (ZOLOFT) 100 mg tablet TAKE 1 TABLET BY MOUTH DAILY (Patient taking differently: Take 100 mg by mouth daily. TAKE 1 TABLET BY MOUTH DAILY) 90 Tab 3  pravastatin (PRAVACHOL) 80 mg tablet Take 1 Tab by mouth nightly. 90 Tab 3  
 multivitamin (ONE A DAY) tablet Take 1 Tab by mouth daily.  azelaic acid (FINACEA) 15 % topical gel Apply  to affected area two (2) times a day.  clindamycin (CLINDAGEL) 1 % topical gel Apply  to affected area two (2) times a day. use thin film on affected area No facility-administered encounter medications on file as of 5/7/2019. Allergies Allergen Reactions  Statins-Hmg-Coa Reductase Inhibitors Myalgia Myalgia with  multiple statins Takes pravachol  Codeine Rash Rash on thighs  Darvon [Propoxyphene] Other (comments) \"I see worms\"  Pcn [Penicillins] Rash  Sulfa (Sulfonamide Antibiotics) Rash Social History Socioeconomic History  Marital status:  Spouse name: Not on file  Number of children: Not on file  Years of education: Not on file  Highest education level: Not on file Occupational History  Not on file Social Needs  Financial resource strain: Not on file  Food insecurity:  
  Worry: Not on file Inability: Not on file  Transportation needs:  
  Medical: Not on file Non-medical: Not on file Tobacco Use  Smoking status: Never Smoker  Smokeless tobacco: Never Used Substance and Sexual Activity  Alcohol use: No  
 Drug use: No  
 Sexual activity: Never Lifestyle  Physical activity:  
  Days per week: Not on file Minutes per session: Not on file  Stress: Not on file Relationships  Social connections:  
  Talks on phone: Not on file Gets together: Not on file Attends [employer] service: Not on file Active member of club or organization: Not on file Attends meetings of clubs or organizations: Not on file Relationship status: Not on file  Intimate partner violence:  
  Fear of current or ex partner: Not on file Emotionally abused: Not on file Physically abused: Not on file Forced sexual activity: Not on file Other Topics Concern  Not on file Social History Narrative  Not on file REVIEW OF SYSTEMS: 
General: negative for - chills or fever ENT: negative for - headaches, nasal congestion or tinnitus Eyes: no blurred or visual changes Neck: No stiffness or swollen nodes Respiratory: negative for -  hemoptysis, shortness of breath at rest or wheezing. Positive for some dyspnea on exertion and mild residual cough Cardiovascular : negative for - chest pain, edema, palpitations or shortness of breath Gastrointestinal: negative for - abdominal pain, blood in stools, heartburn or nausea/vomiting Genito-Urinary: no dysuria, trouble voiding, or hematuria Musculoskeletal: negative for - gait disturbance, joint pain, joint stiffness or joint swelling Neurological: no TIA or stroke symptoms but some generalized weakness Hematologic: no bruises, no bleeding Lymphatic: no swollen glands Integument: no lumps, mole changes, nail changes or rash Endocrine:no malaise/lethargy poly uria or polydipsia or unexpected weight changes Visit Vitals /86 (BP 1 Location: Right arm, BP Patient Position: Sitting) Pulse (!) 103 Temp 97.5 °F (36.4 °C) (Oral) Resp 19 Ht 5' 4\" (1.626 m) Wt 186 lb 12.8 oz (84.7 kg) SpO2 96% BMI 32.06 kg/m² CONSTITUTIONAL: well , well nourished, appears age appropriate HEAD: normocephalic, atraumatic EYES: sclera anicteric, PERRL, EOMI 
ENMT:moist mucous membranes, pharynx clear Nares: w/o erythema or edema NECK: supple. Thyroid normal, No JVD or bruits RESPIRATORY: Chest: clear to ascultation and percussion with mild decreased breath sounds CARDIOVASCULAR: Heart: regular rate and rhythm no murmurs, rubs or gallops, PMI not displaced, no thrill GASTROINTESTINAL: Abdomen: non distended, soft, non-tender, bowel sounds normal 
HEMATOLOGIC: no petechiae or purpura LYMPHATIC: no lymphadenopathy MUSCULOSKELETAL: Extremities: no edema or active synovitis, pulse 1+ BACK; no point or CVAT INTEGUMENT: No unusual rashes or suspicious skin lesions noted. Nails appear normal. 
NEUROLOGIC: non-focal exam  
MENTAL STATUS: alert and oriented, appropriate affect PSYCHIATRIC: normal affect ASSESSMENT:  
1. Acute bronchitis, unspecified organism 2. Hypertension with renal disease 3. Controlled type 2 diabetes mellitus with stage 2 chronic kidney disease, with long-term current use of insulin ([facility] [address] 4. CKD (chronic kidney disease), stage II   
5. Interstitial lung disease ([facility] [address] 6. Screening for tuberculosis 7. Encounter for immunization 8. Screening examination for pulmonary tuberculosis 1. Acute bronchitis with hospitalization for 3 days receiving IV Zithromax 500 daily during that time and discharged on Ceftin 250 twice daily for 7 days after receiving 3 days of Rocephin inpatient. She seems to be resolving as far as any infectious etiology. 2.  Interstitial lung disease I discussed this with Dr. Alicia Barron who saw her this morning in follow-up and has further work-up scheduled for her. 3.  Diabetes mellitus we will see what the blood sugar looks like today with the higher dose of prednisone 4. Hypertension that is controlled 5. CKD stage II we will make sure that is stable that she is on a prednisone PPD placed today. Case discussed with Dr. Alicia Barron from pulmonary and notes from his visit with her earlier today is also reviewed. He does have a concern for possible aspiration and schedule her for a modified barium swallow. He did start on Singulair 10 mg daily and told her after being on that for a week to stop the Claritin-D. He is given her instructions on how to taper the prednisone going from 20 mg a day to 15 mg a day when she finished her Ceftin on the 10th and at that point she is to take 515mg a day for week and then go to 10 mg a day. She will see him back in follow-up in about 2 weeks. He will schedule pulmonary function test at some later date. I will call with lab results and make further recommendations or adjustments if needed. She is at very high risk for repeat hospitalization and I have scheduled close follow-up with her but will go a month unless there is a problem which time she understands she is to see me sooner if 
 
PLAN: 
. Orders Placed This Encounter  METABOLIC PANEL, BASIC ([facility])  CBC WITH AUTOMATED DIFF ([facility])  AMB POC TUBERCULOSIS, INTRADERMAL (SKIN TEST) ATTENTION:  
This medical record was transcribed using an electronic medical records system. Although proofread, it may and can contain electronic and spelling errors. Other human spelling and other errors may be present. Corrections may be executed at a later time. Please feel free to contact us for any clarifications as needed. Follow-up and Dispositions · Return in about 1 month (around 6/7/2019). Baljeet Castro MD 
 
Orders Placed This Encounter  METABOLIC PANEL, BASIC ([facility])  CBC WITH AUTOMATED DIFF ([facility])  AMB POC TUBERCULOSIS, INTRADERMAL (SKIN TEST) No results found for any visits on 05/07/19. I have reviewed the patient's medical history in detail and updated the computerized patient record. We had a prolonged discussion about these complex clinical issues and went over the various important aspects to consider. All questions were answered. Advised her to call back or return to office if symptoms do not improve, change in nature, or persist. 
 
She was given an after visit summary or informed of [facility] which includes patient instructions, diagnoses, current medications, & vitals. She expressed understanding with the diagnosis and plan.

## 2019-05-07 ENCOUNTER — OFFICE VISIT (OUTPATIENT)
Dept: INTERNAL MEDICINE CLINIC | Age: 74
End: 2019-05-07

## 2019-05-07 VITALS
OXYGEN SATURATION: 96 % | SYSTOLIC BLOOD PRESSURE: 124 MMHG | RESPIRATION RATE: 19 BRPM | HEART RATE: 103 BPM | WEIGHT: 186.8 LBS | TEMPERATURE: 97.5 F | HEIGHT: 64 IN | DIASTOLIC BLOOD PRESSURE: 86 MMHG | BODY MASS INDEX: 31.89 KG/M2

## 2019-05-07 DIAGNOSIS — N18.2 CKD (CHRONIC KIDNEY DISEASE), STAGE II: ICD-10-CM

## 2019-05-07 DIAGNOSIS — Z79.4 CONTROLLED TYPE 2 DIABETES MELLITUS WITH STAGE 2 CHRONIC KIDNEY DISEASE, WITH LONG-TERM CURRENT USE OF INSULIN (HCC): ICD-10-CM

## 2019-05-07 DIAGNOSIS — Z23 ENCOUNTER FOR IMMUNIZATION: ICD-10-CM

## 2019-05-07 DIAGNOSIS — Z11.1 SCREENING FOR TUBERCULOSIS: ICD-10-CM

## 2019-05-07 DIAGNOSIS — J20.9 ACUTE BRONCHITIS, UNSPECIFIED ORGANISM: Primary | ICD-10-CM

## 2019-05-07 DIAGNOSIS — E11.22 CONTROLLED TYPE 2 DIABETES MELLITUS WITH STAGE 2 CHRONIC KIDNEY DISEASE, WITH LONG-TERM CURRENT USE OF INSULIN (HCC): ICD-10-CM

## 2019-05-07 DIAGNOSIS — I12.9 HYPERTENSION WITH RENAL DISEASE: ICD-10-CM

## 2019-05-07 DIAGNOSIS — J84.9 INTERSTITIAL LUNG DISEASE (HCC): ICD-10-CM

## 2019-05-07 DIAGNOSIS — N18.2 CONTROLLED TYPE 2 DIABETES MELLITUS WITH STAGE 2 CHRONIC KIDNEY DISEASE, WITH LONG-TERM CURRENT USE OF INSULIN (HCC): ICD-10-CM

## 2019-05-07 DIAGNOSIS — Z11.1 SCREENING EXAMINATION FOR PULMONARY TUBERCULOSIS: ICD-10-CM

## 2019-05-07 LAB
ANION GAP SERPL CALC-SCNC: 17 MMOL/L
BUN SERPL-MCNC: 29 MG/DL (ref 7–17)
CALCIUM SERPL-MCNC: 10.4 MG/DL (ref 8.4–10.2)
CHLORIDE SERPL-SCNC: 98 MMOL/L (ref 98–107)
CO2 SERPL-SCNC: 27 MMOL/L (ref 22–32)
CREAT SERPL-MCNC: 0.9 MG/DL (ref 0.7–1.2)
ERYTHROCYTE [DISTWIDTH] IN BLOOD BY AUTOMATED COUNT: 13.1 %
GLUCOSE SERPL-MCNC: 269 MG/DL (ref 65–105)
HCT VFR BLD AUTO: 48.7 % (ref 37–51)
HGB BLD-MCNC: 16.5 G/DL (ref 12–18)
LYMPHOCYTES ABSOLUTE: 2.2 K/UL (ref 0.6–4.1)
LYMPHOCYTES NFR BLD: 23.7 % (ref 10–58.5)
MCH RBC QN AUTO: 34.5 PG (ref 26–32)
MCHC RBC AUTO-ENTMCNC: 33.9 G/DL (ref 30–36)
MCV RBC AUTO: 101.8 FL (ref 80–97)
MONOCYTES ABS-DIF,2141: 0.5 K/UL (ref 0–1.8)
MONOCYTES NFR BLD: 5.4 % (ref 0.1–24)
NEUTROPHILS # BLD: 70.9 % (ref 37–92)
NEUTROPHILS ABS,2156: 6.5 K/UL (ref 2–7.8)
PLATELET # BLD AUTO: 387 K/UL (ref 140–440)
PMV BLD AUTO: 7.3 FL
POTASSIUM SERPL-SCNC: 4.4 MMOL/L (ref 3.6–5)
RBC # BLD AUTO: 4.78 M/UL (ref 4.2–6.3)
SODIUM SERPL-SCNC: 142 MMOL/L (ref 137–145)
WBC # BLD AUTO: 9.1 K/UL (ref 4.1–10.9)

## 2019-05-07 NOTE — PROGRESS NOTES
Per Dev Grover from Dr. Chrissy Cortez and after obtaing consent from patient 0.1ml of Tuberculin skin test was placed intradermally into left forearm. Patient remained in clinic for 15 mins post injection with no adverse reactions. Patient will return to office Thursday to have results read. LOT [id number] EXPIRES: 3/14/21 : Amadeo Ohloh [address]: [id number] Awilda Padron

## 2019-05-07 NOTE — PROGRESS NOTES
Chief Complaint Patient presents with  Transitions Of Care ED [facility] 4/30/19 d/c 5/3/19 Visit Vitals /86 (BP 1 Location: Right arm, BP Patient Position: Sitting) Pulse (!) 103 Temp 97.5 °F (36.4 °C) (Oral) Resp 19 Ht 5' 4\" (1.626 m) Wt 186 lb 12.8 oz (84.7 kg) SpO2 96% BMI 32.06 kg/m² 1. Have you been to the ER, urgent care clinic since your last visit? Hospitalized since your last visit? Admitted to ED [facility] 4/30/19 d/c 5/3/19 
 
2. Have you seen or consulted any other health care providers outside of the [address] [place] [address] since your last visit? Include any pap smears or colon screening.  No

## 2019-05-07 NOTE — PATIENT INSTRUCTIONS
Bronchitis: Care Instructions Your Care Instructions Bronchitis is inflammation of the bronchial tubes, which carry air to the lungs. The tubes swell and produce mucus, or phlegm. The mucus and inflamed bronchial tubes make you cough. You may have trouble breathing. Most cases of bronchitis are caused by viruses like those that cause colds. Antibiotics usually do not help and they may be harmful. Bronchitis usually develops rapidly and lasts about 2 to 3 weeks in otherwise healthy people. Follow-up care is a key part of your treatment and safety. Be sure to make and go to all appointments, and call your doctor if you are having problems. It's also a good idea to know your test results and keep a list of the medicines you take. How can you care for yourself at home? · Take all medicines exactly as prescribed. Call your doctor if you think you are having a problem with your medicine. · Get some extra rest. 
· Take an over-the-counter pain medicine, such as acetaminophen (Tylenol), ibuprofen (Advil, Motrin), or naproxen (Aleve) to reduce fever and relieve body aches. Read and follow all instructions on the label. · Do not take two or more pain medicines at the same time unless the doctor told you to. Many pain medicines have acetaminophen, which is Tylenol. Too much acetaminophen (Tylenol) can be harmful. · Take an over-the-counter cough medicine that contains dextromethorphan to help quiet a dry, hacking cough so that you can sleep. Avoid cough medicines that have more than one active ingredient. Read and follow all instructions on the label. · Breathe moist air from a humidifier, hot shower, or sink filled with hot water. The heat and moisture will thin mucus so you can cough it out. · Do not smoke. Smoking can make bronchitis worse. If you need help quitting, talk to your doctor about stop-smoking programs and medicines. These can increase your chances of quitting for good. Subjective:       Patient ID: Laz Quintero is a 47 y.o. female.    Chief Complaint: Abdominal Pain and Hematuria    Abdominal Pain   This is a new problem. The current episode started in the past 7 days. The onset quality is gradual. The problem occurs constantly. The problem has been rapidly worsening. The pain is located in the suprapubic region, RLQ and right flank. The pain is at a severity of 10/10. The pain is severe. The quality of the pain is aching and sharp (like contractions). Associated symptoms include anorexia, dysuria, frequency, headaches, hematuria and nausea. Pertinent negatives include no arthralgias, belching, constipation, diarrhea or vomiting. The pain is aggravated by movement, certain positions, urination and deep breathing. The pain is relieved by nothing. Treatments tried: pyridium. The treatment provided no relief.     Review of Systems   Constitutional: Positive for chills and diaphoresis.   HENT: Negative for facial swelling.    Eyes: Negative for visual disturbance.   Respiratory: Negative for shortness of breath.    Cardiovascular: Negative for chest pain.   Gastrointestinal: Positive for abdominal pain, anorexia and nausea. Negative for constipation, diarrhea and vomiting.   Genitourinary: Positive for dysuria, frequency and hematuria.   Musculoskeletal: Negative for arthralgias.   Skin: Negative for rash.   Neurological: Positive for headaches.   Psychiatric/Behavioral: Negative for confusion.       Objective:      Physical Exam   Constitutional: She is oriented to person, place, and time. She appears well-developed and well-nourished. She appears distressed.   HENT:   Head: Atraumatic.   Eyes: No scleral icterus.   Cardiovascular: Normal rate and regular rhythm.    Pulmonary/Chest: Effort normal. No respiratory distress.   Abdominal: Soft. Bowel sounds are normal. She exhibits no distension and no mass. There is tenderness. There is guarding and CVA tenderness. There is no  When should you call for help? Call 911 anytime you think you may need emergency care. For example, call if: 
  · You have severe trouble breathing.  
 Call your doctor now or seek immediate medical care if: 
  · You have new or worse trouble breathing.  
  · You cough up dark brown or bloody mucus (sputum).  
  · You have a new or higher fever.  
  · You have a new rash.  
 Watch closely for changes in your health, and be sure to contact your doctor if: 
  · You cough more deeply or more often, especially if you notice more mucus or a change in the color of your mucus.  
  · You are not getting better as expected. Where can you learn more? Go to http://malia-lincoln.info/. Enter H333 in the search box to learn more about \"Bronchitis: Care Instructions. \" Current as of: September 5, 2018 Content Version: 11.9 © 7938-6870 [employer]. Care instructions adapted under license by [employer] (which disclaims liability or warranty for this information). If you have questions about a medical condition or this instruction, always ask your healthcare professional. [address] any warranty or liability for your use of this information. rebound.   Severe right flank pain with tenderness at CVA and suprapubic area   Neurological: She is alert and oriented to person, place, and time.   Skin: Skin is warm and dry. She is not diaphoretic.   Nursing note and vitals reviewed.      Assessment:       1. Hematuria, unspecified type    2. Urinary tract infection with hematuria, site unspecified    3. Nausea    4. Prophylactic measure    5. Acute right flank pain        Plan:   1. Hematuria, unspecified type  - POCT urine dipstick without microscope  - Urine culture  - CT Renal Stone Study ABD Pelvis WO; Future  - CBC auto differential; Future  - Comprehensive metabolic panel; Future    2. Urinary tract infection with hematuria, site unspecified  - CT Renal Stone Study ABD Pelvis WO; Future  - CBC auto differential; Future  - Comprehensive metabolic panel; Future  - ciprofloxacin HCl (CIPRO) 500 MG tablet; Take 1 tablet (500 mg total) by mouth every 12 (twelve) hours.  Dispense: 14 tablet; Refill: 0  - hydrocodone-acetaminophen 5-325mg (NORCO) 5-325 mg per tablet; Take 1 tablet by mouth every 6 (six) hours as needed for Pain.  Dispense: 20 tablet; Refill: 0    3. Nausea  - ondansetron tablet 4 mg; Take 1 tablet (4 mg total) by mouth one time.    4. Prophylactic measure  - fluconazole (DIFLUCAN) 150 MG Tab; Take 1 tablet (150 mg total) by mouth once daily.  Dispense: 1 tablet; Refill: 0    5. Acute right flank pain  - hydrocodone-acetaminophen 5-325mg (NORCO) 5-325 mg per tablet; Take 1 tablet by mouth every 6 (six) hours as needed for Pain.  Dispense: 20 tablet; Refill: 0  - ketorolac injection 60 mg; Inject 2 mLs (60 mg total) into the muscle one time.      Pt has been given instructions populated from [employer] database and has verbalized understanding of the after visit summary and information contained wherein.    Follow up with a primary care provider. May go to ER for acute shortness of breath, lightheadedness, fever, or any other emergent complaints or  changes in condition.

## 2019-05-08 NOTE — PROGRESS NOTES
Lab results are okay except for elevated blood sugar which time she was a combination of the fact that was not fasting in the prednisone so watch diet.   Please respond that note is received

## 2019-05-10 LAB
MM INDURATION POC: 0 MM (ref 0–5)
PPD POC: NEGATIVE NEGATIVE

## 2019-05-24 DIAGNOSIS — F41.9 ANXIETY: ICD-10-CM

## 2019-05-24 RX ORDER — BENZONATATE 200 MG/1
CAPSULE ORAL
Qty: 30 CAP | Refills: 0 | OUTPATIENT
Start: 2019-05-24

## 2019-05-24 RX ORDER — OXAZEPAM 15 MG/1
CAPSULE ORAL
Qty: 60 CAP | Refills: 2 | OUTPATIENT
Start: 2019-05-24

## 2019-05-24 NOTE — TELEPHONE ENCOUNTER
PCP: Haresh Hare MD    Last appt: 5/7/2019  Future Appointments   Date Time Provider Gwendolyn Loving   6/7/2019  1:50 PM Haresh Hare MD [address] [facility]   6/20/2019  1:00 PM Tico Cruz  [facility]   7/24/2019  9:20 AM Haresh Hare MD [facility] MELISSA SCHED       Requested Prescriptions     Pending Prescriptions Disp Refills    benzonatate (TESSALON) 200 mg capsule [Pharmacy Med Name: BENZONATATE 200MG] 30 Cap 0     Sig: TAKE 1 CAPSULE THREE TIMES DAILY IF NEEDED FOR COUGH EVERY 8 HOURS    oxazepam (SERAX) 15 mg capsule [Pharmacy Med Name: OXAZEPAM  15MG] 60 Cap 2     Sig: TAKE 2 CAPSULES AT BEDTIME

## 2019-06-06 NOTE — PROGRESS NOTES
Chief Complaint   Patient presents with    Hypertension     1 month follow up     Diabetes       SUBJECTIVE:    Steve Santos is a 69 y.o. female she returns in follow-up after being recently treated for an acute bronchitis requiring hospitalization and also diagnosed with interstitial lung disease for which she is been evaluated by Dr. Norris Sheth. He has come to the conclusion that she also has significant reflux and placed on Protonix which has helped. She feels like her breathing is better. She denies any chest pain, palpitations, PND, orthopnea or other cardiorespiratory complaints and the cough is much better but she still would like Tessalon refilled. She denies any current GI complaints that she is on the Protonix. She also is taking Singulair now prescribed by Dr. Norris Sheth. She has no current neurologic complaints and there are no other complaints noted. Current Outpatient Medications   Medication Sig Dispense Refill    multivitamin, tx-iron-ca-min (THERA-M W/ IRON) 9 mg iron-400 mcg tab tablet Take 1 Tab by mouth daily.  pantoprazole (PROTONIX) 40 mg tablet Take 40 mg by mouth daily.  montelukast (SINGULAIR) 10 mg tablet Take 10 mg by mouth daily.  pravastatin (PRAVACHOL) 80 mg tablet Take 1 Tab by mouth nightly. 90 Tab 3    oxazepam (SERAX) 15 mg capsule TAKE 2 CAPSULES AT BEDTIME 60 Cap 2    buPROPion XL (WELLBUTRIN XL) 150 mg tablet Take 1 Tab by mouth daily. TAKE 1 TABLET BY MOUTH EVERY DAY 90 Tab prn    benzonatate (TESSALON) 200 mg capsule TAKE 1 CAPSULE THREE TIMES DAILY IF NEEDED FOR COUGH 30 Cap 0    predniSONE (DELTASONE) 20 mg tablet Take 20 mg by mouth daily (with breakfast). 30 Tab 0    albuterol (PROAIR HFA) 90 mcg/actuation inhaler Take 1 Puff by inhalation every four (4) hours as needed for Wheezing or Shortness of Breath. 1 Inhaler prn    cholecalciferol (VITAMIN D3) 1,000 unit cap Take  by mouth daily. Indications: unknown of the mg.       carvedilol (COREG) 6.25 mg tablet Take 0.5 Tabs by mouth daily. 45 Tab 5    metFORMIN ER (GLUCOPHAGE XR) 500 mg tablet Take one tablet in the morning with breakfast and two tablets in the evening with dinner. 270 Tab 3    glucose blood VI test strips (ACCU-CHEK CHERYL) strip Use once daily 50 Strip prn    hydroCHLOROthiazide (HYDRODIURIL) 25 mg tablet TAKE 1 TABLET EVERY DAY 90 Tab 3    fenofibrate nanocrystallized (TRICOR) 145 mg tablet Take 1 Tab by mouth daily. 30 Tab 11    levocarnitine HCl (ACETYL-L-CARNITINE) Take 500 mg by mouth daily.  sertraline (ZOLOFT) 100 mg tablet TAKE 1 TABLET BY MOUTH DAILY (Patient taking differently: Take 100 mg by mouth daily. TAKE 1 TABLET BY MOUTH DAILY) 90 Tab 3    multivitamin (ONE A DAY) tablet Take 1 Tab by mouth daily.  azelaic acid (FINACEA) 15 % topical gel Apply  to affected area two (2) times a day.  clindamycin (CLINDAGEL) 1 % topical gel Apply  to affected area two (2) times a day. use thin film on affected area      biotin-silicon diox-L-cysteine 3,000 mcg -100 mg-50 mg TbER Take 1 Tab by mouth daily.        Past Medical History:   Diagnosis Date    Abnormal LFTs 08/24/2017    FATTY LIVER    Arthritis     OSTEO    Avascular necrosis of hip ([facility] [address] 08/24/2017    BILAT HIPS    Breast CA ([facility] [address] 1989, 2001    BILATERAL; SURGERY, CHEMO    Chronic pain     CKD (chronic kidney disease), stage II 8/24/2017    Coagulation disorder ([facility] [address] 1984    ITP  (DR CHU BRADSHAW) - PLATELETS DROPPED TO 5K    Depression     Diabetes ([address]     TYPE 2; NIDDM    DJD (degenerative joint disease), multiple sites 8/24/2017    GI bleed 2008    HEMORRHOIDS    Hyperlipemia     Hypertension with renal disease 8/24/2017    IBS (irritable bowel syndrome) 8/24/2017    Ill-defined condition 2015    PNEUMONIA X2 - HOSPITALIZED IN 2015    Interstitial lung disease ([address] 04/01/2019    Liver disease     FATTY LIVER    Myalgia 8/24/2017    On statin therapy 8/24/2017    Overactive bladder 8/24/2017    Pneumonia 04/2015    HOSPITALIZED 3 WEEKS.  Polymyalgia rheumatica ([facility] [address] 8/24/2017    Prophylactic antibiotic 8/24/2017    Reflex abnormality     acid reflex    Rosacea      Past Surgical History:   Procedure Laterality Date    BREAST SURGERY PROCEDURE UNLISTED  1993, 2002    RECONSTRUCTION X2    HX APPENDECTOMY  1950    HX BREAST BIOPSY Bilateral     HX CATARACT REMOVAL Bilateral     HX COLONOSCOPY      HX ENDOSCOPY      HX GI      COLONOSCOPY    HX HEENT      WISDOM TEETH    HX HYSTERECTOMY  2000S    HX MASTECTOMY Right 1989    HX MASTECTOMY Left 2001    HX ORTHOPAEDIC  2008    LUMBAR FUSION    HX ORTHOPAEDIC  2018    RE-DO LUMBAR FUSION, AND ADDED THORACIC FUSION    HX ORTHOPAEDIC  03/26/2019    neckectomy    HX TUBAL LIGATION  1981    HX UROLOGICAL  2000s    BLADDER SLING    TOTAL HIP ARTHROPLASTY Left 11/16/2016    ANTERIOR APPROACH, DR Gwendoyln De La Torre; (POSTOP: STANDS ONE INCH TALLER ON LEFT FOOT)    TOTAL HIP ARTHROPLASTY Right 12/2016    ANTERIOR APPROACH (DR JAIME)     Allergies   Allergen Reactions    Statins-Hmg-Coa Reductase Inhibitors Myalgia     Myalgia with  multiple statins  Takes pravachol     Codeine Rash     Rash on thighs    Darvon [Propoxyphene] Other (comments)     \"I see worms\"    Pcn [Penicillins] Rash    Sulfa (Sulfonamide Antibiotics) Rash       REVIEW OF SYSTEMS:  General: negative for - chills or fever, or weight loss or gain  ENT: negative for - headaches, nasal congestion or tinnitus  Eyes: no blurred or visual changes  Neck: No stiffness or swollen nodes  Respiratory: negative for -  hemoptysis, shortness of breath or wheezing.   Positive for some residual cough  Cardiovascular : negative for - chest pain, edema, palpitations or shortness of breath  Gastrointestinal: negative for - abdominal pain, blood in stools, heartburn or nausea/vomiting  Genito-Urinary: no dysuria, trouble voiding, or hematuria  Musculoskeletal: negative for - gait disturbance, joint pain, joint stiffness or joint swelling  Neurological: no TIA or stroke symptoms  Hematologic: no bruises, no bleeding  Lymphatic: no swollen glands  Integument: no lumps, mole changes, nail changes or rash  Endocrine:no malaise/lethargy poly uria or polydipsia or unexpected weight changes        Social History     Socioeconomic History    Marital status:      Spouse name: Not on file    Number of children: Not on file    Years of education: Not on file    Highest education level: Not on file   Tobacco Use    Smoking status: Never Smoker    Smokeless tobacco: Never Used   Substance and Sexual Activity    Alcohol use: No    Drug use: No    Sexual activity: Never     Family History   Problem Relation Age of Onset    Heart Disease Mother     Kidney Disease Mother     Lung Disease Mother         COPD    Diabetes Brother     Pacemaker Brother     Kidney Disease Brother     Hypertension Brother     Anesth Problems Neg Hx        OBJECTIVE:     Visit Vitals  /80 (BP 1 Location: Left arm, BP Patient Position: Sitting)   Pulse 100   Temp 97.7 °F (36.5 °C) (Oral)   Resp 18   Ht 5' 4\" (1.626 m)   Wt 184 lb 12.8 oz (83.8 kg)   SpO2 96%   BMI 31.72 kg/m²     CONSTITUTIONAL:   well nourished, appears age appropriate  EYES: sclera anicteric, PERRL, EOMI  ENMT:nares clear, moist mucous membranes, pharynx clear  NECK: supple. Thyroid normal, No JVD or bruits  RESPIRATORY: Chest: clear to ascultation and percussion, normal inspiratory effort  CARDIOVASCULAR: Heart: regular rate and rhythm no murmurs, rubs or gallops, PMI not displaced, No thrills  GASTROINTESTINAL: Abdomen: non distended, soft, non tender, bowel sounds normal  HEMATOLOGIC: no purpura, petechiae or bruising  LYMPHATIC: No lymph node enlargemant  MUSCULOSKELETAL: Extremities: no edema or active synovitis, pulse 1+   INTEGUMENT: No unusual rashes or suspicious skin lesions noted.  Nails appear normal.  PERIPHERAL VASCULAR: normal pulses femoral, PT and DP  NEUROLOGIC: non-focal exam, A & O X 3  PSYCHIATRIC:, appropriate affect     ASSESSMENT:   1. Interstitial lung disease ([facility] [address]    2. Controlled type 2 diabetes mellitus with stage 2 chronic kidney disease, with long-term current use of insulin ([facility] [address]    3. CKD (chronic kidney disease), stage II    4. Class 1 obesity due to excess calories without serious comorbidity with body mass index (BMI) of 33.0 to 33.9 in adult    5. Mixed hyperlipidemia    6. Anxiety      Impression  1. Interstitial lung disease being followed by Dr. Norris Sheth and seems to be improved a little by treating her reflux and adding of Singulair  2. Diabetes we will see what the blood sugar looks like on the BMP  3. CKD status pending  4. Obesity we did discuss diet, weight reduction and exercise. 5.  Hyperlipidemia stable on last check  6. Anxiety renewed her Serax as well as her Wellbutrin  At this point we will make no medicine changes and I will recheck at her prior scheduled appointment in July for other medical problems or sooner should it be a problem. PLAN:  .  Orders Placed This Encounter    METABOLIC PANEL, BASIC ([facility])    multivitamin, tx-iron-ca-min (THERA-M W/ IRON) 9 mg iron-400 mcg tab tablet    pantoprazole (PROTONIX) 40 mg tablet    montelukast (SINGULAIR) 10 mg tablet    pravastatin (PRAVACHOL) 80 mg tablet    oxazepam (SERAX) 15 mg capsule    buPROPion XL (WELLBUTRIN XL) 150 mg tablet    benzonatate (TESSALON) 200 mg capsule         ATTENTION:   This medical record was transcribed using an electronic medical records system. Although proofread, it may and can contain electronic and spelling errors. Other human spelling and other errors may be present. Corrections may be executed at a later time. Please feel free to contact us for any clarifications as needed. Follow-up and Dispositions    · Return At prior appt.          No results found for any visits on 06/07/19. Karl Gonzales MD    The patient verbalized understanding of the problems and plans as explained.

## 2019-06-07 ENCOUNTER — TELEPHONE (OUTPATIENT)
Dept: INTERNAL MEDICINE CLINIC | Age: 74
End: 2019-06-07

## 2019-06-07 ENCOUNTER — OFFICE VISIT (OUTPATIENT)
Dept: INTERNAL MEDICINE CLINIC | Age: 74
End: 2019-06-07

## 2019-06-07 VITALS
HEIGHT: 64 IN | SYSTOLIC BLOOD PRESSURE: 122 MMHG | WEIGHT: 184.8 LBS | RESPIRATION RATE: 18 BRPM | OXYGEN SATURATION: 96 % | BODY MASS INDEX: 31.55 KG/M2 | DIASTOLIC BLOOD PRESSURE: 80 MMHG | TEMPERATURE: 97.7 F | HEART RATE: 100 BPM

## 2019-06-07 DIAGNOSIS — E78.2 MIXED HYPERLIPIDEMIA: ICD-10-CM

## 2019-06-07 DIAGNOSIS — N18.2 CKD (CHRONIC KIDNEY DISEASE), STAGE II: ICD-10-CM

## 2019-06-07 DIAGNOSIS — E66.09 CLASS 1 OBESITY DUE TO EXCESS CALORIES WITHOUT SERIOUS COMORBIDITY WITH BODY MASS INDEX (BMI) OF 33.0 TO 33.9 IN ADULT: ICD-10-CM

## 2019-06-07 DIAGNOSIS — E11.22 CONTROLLED TYPE 2 DIABETES MELLITUS WITH STAGE 2 CHRONIC KIDNEY DISEASE, WITH LONG-TERM CURRENT USE OF INSULIN (HCC): ICD-10-CM

## 2019-06-07 DIAGNOSIS — J84.9 INTERSTITIAL LUNG DISEASE (HCC): Primary | ICD-10-CM

## 2019-06-07 DIAGNOSIS — N18.2 CONTROLLED TYPE 2 DIABETES MELLITUS WITH STAGE 2 CHRONIC KIDNEY DISEASE, WITH LONG-TERM CURRENT USE OF INSULIN (HCC): ICD-10-CM

## 2019-06-07 DIAGNOSIS — F41.9 ANXIETY: ICD-10-CM

## 2019-06-07 DIAGNOSIS — Z79.4 CONTROLLED TYPE 2 DIABETES MELLITUS WITH STAGE 2 CHRONIC KIDNEY DISEASE, WITH LONG-TERM CURRENT USE OF INSULIN (HCC): ICD-10-CM

## 2019-06-07 LAB
ANION GAP SERPL CALC-SCNC: 11 MMOL/L
ANION GAP SERPL CALC-SCNC: 13 MMOL/L
BUN SERPL-MCNC: 17 MG/DL (ref 7–17)
BUN SERPL-MCNC: 31 MG/DL (ref 7–17)
CALCIUM SERPL-MCNC: 10.2 MG/DL (ref 8.4–10.2)
CALCIUM SERPL-MCNC: 9.3 MG/DL (ref 8.4–10.2)
CHLORIDE SERPL-SCNC: 100 MMOL/L (ref 98–107)
CHLORIDE SERPL-SCNC: 96 MMOL/L (ref 98–107)
CO2 SERPL-SCNC: 29 MMOL/L (ref 22–32)
CO2 SERPL-SCNC: 30 MMOL/L (ref 22–32)
CREAT SERPL-MCNC: 1.1 MG/DL (ref 0.7–1.2)
CREAT SERPL-MCNC: 1.2 MG/DL (ref 0.7–1.2)
GLUCOSE SERPL-MCNC: 194 MG/DL (ref 65–105)
GLUCOSE SERPL-MCNC: 532 MG/DL (ref 65–105)
POTASSIUM SERPL-SCNC: 4.2 MMOL/L (ref 3.6–5)
POTASSIUM SERPL-SCNC: 4.4 MMOL/L (ref 3.6–5)
SODIUM SERPL-SCNC: 136 MMOL/L (ref 137–145)
SODIUM SERPL-SCNC: 143 MMOL/L (ref 137–145)

## 2019-06-07 RX ORDER — BENZONATATE 200 MG/1
CAPSULE ORAL
Qty: 30 CAP | Refills: 0 | Status: SHIPPED | OUTPATIENT
Start: 2019-06-07 | End: 2019-08-09 | Stop reason: ALTCHOICE

## 2019-06-07 RX ORDER — PANTOPRAZOLE SODIUM 40 MG/1
40 TABLET, DELAYED RELEASE ORAL DAILY
COMMUNITY
End: 2019-07-24 | Stop reason: ALTCHOICE

## 2019-06-07 RX ORDER — METFORMIN HYDROCHLORIDE 500 MG/1
TABLET, EXTENDED RELEASE ORAL
Qty: 270 TAB | Refills: 3 | Status: SHIPPED | OUTPATIENT
Start: 2019-06-07 | End: 2019-07-24 | Stop reason: SDUPTHER

## 2019-06-07 RX ORDER — MONTELUKAST SODIUM 10 MG/1
10 TABLET ORAL DAILY
COMMUNITY
End: 2019-07-24 | Stop reason: ALTCHOICE

## 2019-06-07 RX ORDER — PRAVASTATIN SODIUM 80 MG/1
80 TABLET ORAL
Qty: 90 TAB | Refills: 3 | Status: SHIPPED | OUTPATIENT
Start: 2019-06-07 | End: 2020-06-16

## 2019-06-07 RX ORDER — BUPROPION HYDROCHLORIDE 150 MG/1
150 TABLET ORAL DAILY
Qty: 90 TAB | Status: SHIPPED | OUTPATIENT
Start: 2019-06-07 | End: 2019-07-24 | Stop reason: ALTCHOICE

## 2019-06-07 RX ORDER — OXAZEPAM 15 MG/1
CAPSULE ORAL
Qty: 60 CAP | Refills: 2 | Status: SHIPPED | OUTPATIENT
Start: 2019-06-07 | End: 2019-12-10 | Stop reason: SDUPTHER

## 2019-06-07 NOTE — PATIENT INSTRUCTIONS
Arthritis: Care Instructions Your Care Instructions Arthritis, also called osteoarthritis, is a breakdown of the cartilage that cushions your joints. When the cartilage wears down, your bones rub against each other. This causes pain and stiffness. Many people have some arthritis as they age. Arthritis most often affects the joints of the spine, hands, hips, knees, or feet. You can take simple measures to protect your joints, ease your pain, and help you stay active. Follow-up care is a key part of your treatment and safety. Be sure to make and go to all appointments, and call your doctor if you are having problems. It's also a good idea to know your test results and keep a list of the medicines you take. How can you care for yourself at home? · Stay at a healthy weight. Being overweight puts extra strain on your joints. · Talk to your doctor or physical therapist about exercises that will help ease joint pain. ? Stretch. You may enjoy gentle forms of yoga to help keep your joints and muscles flexible. ? Walk instead of jog. Other types of exercise that are less stressful on the joints include riding a bicycle, swimming, bbeo chi, or water exercise. ? Lift weights. Strong muscles help reduce stress on your joints. Stronger thigh muscles, for example, take some of the stress off of the knees and hips. Learn the right way to lift weights so you do not make joint pain worse. · Take your medicines exactly as prescribed. Call your doctor if you think you are having a problem with your medicine. · Take pain medicines exactly as directed. ? If the doctor gave you a prescription medicine for pain, take it as prescribed. ? If you are not taking a prescription pain medicine, ask your doctor if you can take an over-the-counter medicine. · Use a cane, crutch, walker, or another device if you need help to get around. These can help rest your joints.  You also can use other things to make life easier, such as a higher toilet seat and padded handles on kitchen utensils. · Do not sit in low chairs, which can make it hard to get up. · Put heat or cold on your sore joints as needed. Use whichever helps you most. You also can take turns with hot and cold packs. ? Apply heat 2 or 3 times a day for 20 to 30 minutesusing a heating pad, hot shower, or hot packto relieve pain and stiffness. ? Put ice or a cold pack on your sore joint for 10 to 20 minutes at a time. Put a thin cloth between the ice and your skin. When should you call for help? Call your doctor now or seek immediate medical care if: 
  · You have sudden swelling, warmth, or pain in any joint.  
  · You have joint pain and a fever or rash.  
  · You have such bad pain that you cannot use a joint.  
 Watch closely for changes in your health, and be sure to contact your doctor if: 
  · You have mild joint symptoms that continue even with more than 6 weeks of care at home.  
  · You have stomach pain or other problems with your medicine. Where can you learn more? Go to http://malia-lincoln.info/. Enter B493 in the search box to learn more about \"Arthritis: Care Instructions. \" Current as of: Etelvina 10, 2018 Content Version: 11.9 © 7636-0006 [employer]. Care instructions adapted under license by [employer] (which disclaims liability or warranty for this information). If you have questions about a medical condition or this instruction, always ask your healthcare professional. [place] [address] any warranty or liability for your use of this information.

## 2019-06-07 NOTE — TELEPHONE ENCOUNTER
RX refill request from the patient/pharmacy. Patient last seen 06- with labs, and next appt. scheduled for 07-  Requested Prescriptions     Pending Prescriptions Disp Refills    metFORMIN ER (GLUCOPHAGE XR) 500 mg tablet 270 Tab 3     Sig: Take two tablets in the morning with breakfast and two tablets in the evening with dinner. Lidya Ramirez

## 2019-06-07 NOTE — TELEPHONE ENCOUNTER
Informed patient that her blood sugar result was 532. Reviewed with patient regarding her medication. Has been off her Prednisone for 3 weeks per Dr. Geena Rockwell. Taking Metformin 500 mg 1 in the am and 2 in the pm.  Patient has not been doing really well with her diet and states she ate a candy bar prior to her visit. Discussed with Dr. Kevin Hopkins and he desires patient to increase her Metformin 500 mg to 2 tabs in the am and 2 tabs in the pm and will get a f/up FBS next week.

## 2019-06-07 NOTE — PROGRESS NOTES
Evita Baumann  Identified pt with two pt identifiers(name and ). Chief Complaint   Patient presents with    Hypertension     1 month follow up     Diabetes       1. Have you been to the ER, urgent care clinic since your last visit? Hospitalized since your last visit? No    2. Have you seen or consulted any other health care providers outside of the [address] [place] [address] since your last visit? Include any pap smears or colon screening. No      Health Maintenance Topics with due status: Overdue       Topic Date Due    EYE EXAM RETINAL OR DILATED 01/15/1957    DTaP/Tdap/Td series 01/15/1968    Shingrix Vaccine Age 50> 01/15/1997    GLAUCOMA SCREENING Q2Y 05/15/2019     Health Maintenance Topics with due status: Not Due       Topic Last Completion Date    COLONOSCOPY 10/03/2016    Influenza Age 9 to Adult 10/15/2018    MEDICARE YEARLY EXAM 2019    FOOT EXAM Q1 2019    MICROALBUMIN Q1 2019    HEMOGLOBIN A1C Q6M 2019    LIPID PANEL Q1 2019     Health Maintenance Topics with due status: Completed       Topic Last Completion Date    Bone Densitometry (Dexa) Screening 2014    Pneumococcal 65+ years 2015    Hepatitis C Screening 10/02/2017           Medication reconciliation up to date and corrected with patient at this time. Today's provider has been notified of reason for visit, vitals and flowsheets obtained on patients. Reviewed record in preparation for visit, huddled with provider and have obtained necessary documentation.         Wt Readings from Last 3 Encounters:   19 184 lb 12.8 oz (83.8 kg)   19 186 lb 12.8 oz (84.7 kg)   19 188 lb 3.2 oz (85.4 kg)     Temp Readings from Last 3 Encounters:   19 97.7 °F (36.5 °C) (Oral)   19 97.5 °F (36.4 °C) (Oral)   19 97.9 °F (36.6 °C)     BP Readings from Last 3 Encounters:   19 122/80   19 124/86   19 145/89     Pulse Readings from Last 3 Encounters:   19 100   05/07/19 (!) 103   05/03/19 64     Vitals:    06/07/19 1352   BP: 122/80   Pulse: 100   Resp: 18   Temp: 97.7 °F (36.5 °C)   TempSrc: Oral   SpO2: 96%   Weight: 184 lb 12.8 oz (83.8 kg)   Height: 5' 4\" (1.626 m)   PainSc:   0 - No pain         Learning Assessment:  :     Learning Assessment 10/2/2017   PRIMARY LEARNER Patient   HIGHEST LEVEL OF EDUCATION - PRIMARY LEARNER  > 4 YEARS OF COLLEGE   PRIMARY LANGUAGE ENGLISH   LEARNER PREFERENCE PRIMARY LISTENING   ANSWERED BY patient   RELATIONSHIP SELF       Depression Screening:  :     3 most recent PHQ Screens 1/14/2019   Little interest or pleasure in doing things Not at all   Feeling down, depressed, irritable, or hopeless Not at all   Total Score PHQ 2 0       No flowsheet data found. Fall Risk Assessment:  :     Fall Risk Assessment, last 12 mths 5/7/2019   Able to walk? Yes   Fall in past 12 months? No       Abuse Screening:  :     Abuse Screening Questionnaire 1/14/2019 10/2/2017   Do you ever feel afraid of your partner? N N   Are you in a relationship with someone who physically or mentally threatens you? N N   Is it safe for you to go home?  Y Y       ADL Screening:  :     ADL Assessment 8/28/2018   Feeding yourself No Help Needed   Getting from bed to chair No Help Needed   Getting dressed No Help Needed   Bathing or showering No Help Needed   Walk across the room (includes cane/walker) No Help Needed   Using the telphone No Help Needed   Taking your medications No Help Needed   Preparing meals No Help Needed   Managing money (expenses/bills) No Help Needed   Moderately strenuous housework (laundry) No Help Needed   Shopping for personal items (toiletries/medicines) No Help Needed   Shopping for groceries No Help Needed   Driving No Help Needed   Climbing a flight of stairs No Help Needed   Getting to places beyond walking distances No Help Needed

## 2019-06-10 NOTE — PROGRESS NOTES
Chief Complaint   Patient presents with    Blood sugar problem       SUBJECTIVE:    Sree Plummer is a 69 y.o. female who returns in follow-up regarding her diabetes out-of-control. She was seen here on June 7 at which time her fasting blood work remarkable for blood sugar 532 however at 3:40 PM that day her blood sugar was 148 by her check at home. She had noted and no increased thirst or polyuria as well as no visual symptoms or any sequela blood sugar being out of control. She has been monitoring her blood sugars since then and the highest it was was 177 on June 9 this morning it was 178 by her check at home she has noted no other problems at all. She denies any cardiorespiratory complaints. And no GI or  complaints. Current Outpatient Medications   Medication Sig Dispense Refill    multivitamin, tx-iron-ca-min (THERA-M W/ IRON) 9 mg iron-400 mcg tab tablet Take 1 Tab by mouth daily.  pantoprazole (PROTONIX) 40 mg tablet Take 40 mg by mouth daily.  montelukast (SINGULAIR) 10 mg tablet Take 10 mg by mouth daily.  pravastatin (PRAVACHOL) 80 mg tablet Take 1 Tab by mouth nightly. 90 Tab 3    oxazepam (SERAX) 15 mg capsule TAKE 2 CAPSULES AT BEDTIME 60 Cap 2    buPROPion XL (WELLBUTRIN XL) 150 mg tablet Take 1 Tab by mouth daily. TAKE 1 TABLET BY MOUTH EVERY DAY 90 Tab prn    benzonatate (TESSALON) 200 mg capsule TAKE 1 CAPSULE THREE TIMES DAILY IF NEEDED FOR COUGH 30 Cap 0    metFORMIN ER (GLUCOPHAGE XR) 500 mg tablet Take two tablets in the morning with breakfast and two tablets in the evening with dinner. 270 Tab 3    predniSONE (DELTASONE) 20 mg tablet Take 20 mg by mouth daily (with breakfast). 30 Tab 0    albuterol (PROAIR HFA) 90 mcg/actuation inhaler Take 1 Puff by inhalation every four (4) hours as needed for Wheezing or Shortness of Breath. 1 Inhaler prn    cholecalciferol (VITAMIN D3) 1,000 unit cap Take  by mouth daily. Indications: unknown of the mg.       carvedilol (COREG) 6.25 mg tablet Take 0.5 Tabs by mouth daily. 45 Tab 5    biotin-silicon diox-L-cysteine 3,000 mcg -100 mg-50 mg TbER Take 1 Tab by mouth daily.  glucose blood VI test strips (ACCU-CHEK CHERYL) strip Use once daily 50 Strip prn    hydroCHLOROthiazide (HYDRODIURIL) 25 mg tablet TAKE 1 TABLET EVERY DAY 90 Tab 3    fenofibrate nanocrystallized (TRICOR) 145 mg tablet Take 1 Tab by mouth daily. 30 Tab 11    levocarnitine HCl (ACETYL-L-CARNITINE) Take 500 mg by mouth daily.  sertraline (ZOLOFT) 100 mg tablet TAKE 1 TABLET BY MOUTH DAILY (Patient taking differently: Take 100 mg by mouth daily. TAKE 1 TABLET BY MOUTH DAILY) 90 Tab 3    multivitamin (ONE A DAY) tablet Take 1 Tab by mouth daily.  azelaic acid (FINACEA) 15 % topical gel Apply  to affected area two (2) times a day.  clindamycin (CLINDAGEL) 1 % topical gel Apply  to affected area two (2) times a day. use thin film on affected area       Past Medical History:   Diagnosis Date    Abnormal LFTs 08/24/2017    FATTY LIVER    Arthritis     OSTEO    Avascular necrosis of hip ([facility] 08/24/2017    BILAT HIPS    Breast CA ([facility] 1989, 2001    BILATERAL; SURGERY, CHEMO    Chronic pain     CKD (chronic kidney disease), stage II 8/24/2017    Coagulation disorder ([facility] 1984    ITP  (DR CHU BRADSHAW) - PLATELETS DROPPED TO 5K    Depression     Diabetes ([facility]     TYPE 2; NIDDM    DJD (degenerative joint disease), multiple sites 8/24/2017    GI bleed 2008    HEMORRHOIDS    Hyperlipemia     Hypertension with renal disease 8/24/2017    IBS (irritable bowel syndrome) 8/24/2017    Ill-defined condition 2015    PNEUMONIA X2 - HOSPITALIZED IN 2015    Interstitial lung disease ([facility] 04/01/2019    Liver disease     FATTY LIVER    Myalgia 8/24/2017    On statin therapy 8/24/2017    Overactive bladder 8/24/2017    Pneumonia 04/2015    HOSPITALIZED 3 WEEKS.     Polymyalgia rheumatica (HCC) 8/24/2017    Prophylactic antibiotic 8/24/2017    Reflex abnormality     acid reflex    Rosacea      Past Surgical History:   Procedure Laterality Date    BREAST SURGERY PROCEDURE UNLISTED  1993, 2002    RECONSTRUCTION X2    HX APPENDECTOMY  1950    HX BREAST BIOPSY Bilateral     HX CATARACT REMOVAL Bilateral     HX COLONOSCOPY      HX ENDOSCOPY      HX GI      COLONOSCOPY    HX HEENT      WISDOM TEETH    HX HYSTERECTOMY  2000S    HX MASTECTOMY Right 1989    HX MASTECTOMY Left 2001    HX ORTHOPAEDIC  2008    LUMBAR FUSION    HX ORTHOPAEDIC  2018    RE-DO LUMBAR FUSION, AND ADDED THORACIC FUSION    HX ORTHOPAEDIC  03/26/2019    neckectomy    HX TUBAL LIGATION  1981    HX UROLOGICAL  2000s    BLADDER SLING    TOTAL HIP ARTHROPLASTY Left 11/16/2016    ANTERIOR APPROACH, DR Gwendolyn De La Torre; (POSTOP: STANDS ONE INCH TALLER ON LEFT FOOT)    TOTAL HIP ARTHROPLASTY Right 12/2016    ANTERIOR APPROACH (DR JAIME)     Allergies   Allergen Reactions    Statins-Hmg-Coa Reductase Inhibitors Myalgia     Myalgia with  multiple statins  Takes pravachol     Codeine Rash     Rash on thighs    Darvon [Propoxyphene] Other (comments)     \"I see worms\"    Pcn [Penicillins] Rash    Sulfa (Sulfonamide Antibiotics) Rash       REVIEW OF SYSTEMS:  General: negative for - chills or fever, or weight loss or gain  ENT: negative for - headaches, nasal congestion or tinnitus  Eyes: no blurred or visual changes  Neck: No stiffness or swollen nodes  Respiratory: negative for - cough, hemoptysis, shortness of breath or wheezing  Cardiovascular : negative for - chest pain, edema, palpitations or shortness of breath  Gastrointestinal: negative for - abdominal pain, blood in stools, heartburn or nausea/vomiting  Genito-Urinary: no dysuria, trouble voiding, or hematuria  Musculoskeletal: negative for - gait disturbance, joint pain, joint stiffness or joint swelling  Neurological: no TIA or stroke symptoms  Hematologic: no bruises, no bleeding  Lymphatic: no swollen glands  Integument: no lumps, mole changes, nail changes or rash  Endocrine:no malaise/lethargy poly uria or polydipsia or unexpected weight changes        Social History     Socioeconomic History    Marital status:      Spouse name: Not on file    Number of children: Not on file    Years of education: Not on file    Highest education level: Not on file   Tobacco Use    Smoking status: Never Smoker    Smokeless tobacco: Never Used   Substance and Sexual Activity    Alcohol use: No    Drug use: No    Sexual activity: Never     Family History   Problem Relation Age of Onset    Heart Disease Mother     Kidney Disease Mother     Lung Disease Mother         COPD    Diabetes Brother     Pacemaker Brother     Kidney Disease Brother     Hypertension Brother     Anesth Problems Neg Hx        OBJECTIVE:     Visit Vitals  /86 (BP 1 Location: Right arm, BP Patient Position: Sitting)   Pulse 88   Temp 98.9 °F (37.2 °C) (Oral)   Resp 18   Ht 5' 4\" (1.626 m)   Wt 187 lb 6.4 oz (85 kg)   SpO2 96%   BMI 32.17 kg/m²     CONSTITUTIONAL:   well nourished, appears age appropriate  EYES: sclera anicteric, PERRL, EOMI  ENMT:nares clear, moist mucous membranes, pharynx clear  NECK: supple. Thyroid normal, No JVD or bruits  RESPIRATORY: Chest: clear to ascultation and percussion, normal inspiratory effort  CARDIOVASCULAR: Heart: regular rate and rhythm no murmurs, rubs or gallops, PMI not displaced, No thrills  GASTROINTESTINAL: Abdomen: non distended, soft, non tender, bowel sounds normal  HEMATOLOGIC: no purpura, petechiae or bruising  LYMPHATIC: No lymph node enlargemant  MUSCULOSKELETAL: Extremities: no edema or active synovitis, pulse 1+   INTEGUMENT: No unusual rashes or suspicious skin lesions noted. Nails appear normal.  PERIPHERAL VASCULAR: normal pulses femoral, PT and DP  NEUROLOGIC: non-focal exam, A & O X 3  PSYCHIATRIC:, appropriate affect     ASSESSMENT:   1.  Controlled type 2 diabetes mellitus with stage 2 chronic kidney disease, with long-term current use of insulin ([address]    2. Interstitial lung disease ([address]    3. Hypertension with renal disease      Impression  1. Diabetes mellitus with recent blood sugar 532, repeat BMP pending today and I will do the blood sugar stat  2. Interstitial lung disease now off prednisone for about 3 and half weeks  3. Hypertension that is controlled  I will call with lab results and make further recommendations or adjustments if necessary. Follow-up otherwise as per prior schedule. PLAN:  .  Orders Placed This Encounter    METABOLIC PANEL, BASIC ([facility])         ATTENTION:   This medical record was transcribed using an electronic medical records system. Although proofread, it may and can contain electronic and spelling errors. Other human spelling and other errors may be present. Corrections may be executed at a later time. Please feel free to contact us for any clarifications as needed. Follow-up and Dispositions    · Return At prior appt. No results found for any visits on 06/11/19. Julia Maravilla MD    The patient verbalized understanding of the problems and plans as explained.

## 2019-06-11 ENCOUNTER — OFFICE VISIT (OUTPATIENT)
Dept: INTERNAL MEDICINE CLINIC | Age: 74
End: 2019-06-11

## 2019-06-11 VITALS
SYSTOLIC BLOOD PRESSURE: 128 MMHG | OXYGEN SATURATION: 96 % | TEMPERATURE: 98.9 F | BODY MASS INDEX: 31.99 KG/M2 | WEIGHT: 187.4 LBS | DIASTOLIC BLOOD PRESSURE: 86 MMHG | HEIGHT: 64 IN | HEART RATE: 88 BPM | RESPIRATION RATE: 18 BRPM

## 2019-06-11 DIAGNOSIS — E11.22 CONTROLLED TYPE 2 DIABETES MELLITUS WITH STAGE 2 CHRONIC KIDNEY DISEASE, WITH LONG-TERM CURRENT USE OF INSULIN (HCC): Primary | ICD-10-CM

## 2019-06-11 DIAGNOSIS — Z79.4 CONTROLLED TYPE 2 DIABETES MELLITUS WITH STAGE 2 CHRONIC KIDNEY DISEASE, WITH LONG-TERM CURRENT USE OF INSULIN (HCC): Primary | ICD-10-CM

## 2019-06-11 DIAGNOSIS — I12.9 HYPERTENSION WITH RENAL DISEASE: ICD-10-CM

## 2019-06-11 DIAGNOSIS — J84.9 INTERSTITIAL LUNG DISEASE (HCC): ICD-10-CM

## 2019-06-11 DIAGNOSIS — N18.2 CONTROLLED TYPE 2 DIABETES MELLITUS WITH STAGE 2 CHRONIC KIDNEY DISEASE, WITH LONG-TERM CURRENT USE OF INSULIN (HCC): Primary | ICD-10-CM

## 2019-06-11 LAB
BUN SERPL-MCNC: 19 MG/DL (ref 8–27)
BUN/CREAT SERPL: 21 (ref 12–28)
CALCIUM SERPL-MCNC: 9.7 MG/DL (ref 8.7–10.3)
CHLORIDE SERPL-SCNC: 100 MMOL/L (ref 96–106)
CO2 SERPL-SCNC: 25 MMOL/L (ref 20–29)
CREAT SERPL-MCNC: 0.89 MG/DL (ref 0.57–1)
GLUCOSE SERPL-MCNC: 183 MG/DL (ref 65–99)
POTASSIUM SERPL-SCNC: 4.2 MMOL/L (ref 3.5–5.2)
SODIUM SERPL-SCNC: 139 MMOL/L (ref 134–144)

## 2019-06-11 NOTE — PATIENT INSTRUCTIONS

## 2019-06-11 NOTE — PROGRESS NOTES
Chief Complaint   Patient presents with    Blood sugar problem     Visit Vitals  /86 (BP 1 Location: Right arm, BP Patient Position: Sitting)   Pulse 88   Temp 98.9 °F (37.2 °C) (Oral)   Resp 18   Ht 5' 4\" (1.626 m)   Wt 187 lb 6.4 oz (85 kg)   SpO2 96%   BMI 32.17 kg/m²     1. Have you been to the ER, urgent care clinic since your last visit? Hospitalized since your last visit? No    2. Have you seen or consulted any other health care providers outside of the [address] [place] [address] since your last visit? Include any pap smears or colon screening.  No\

## 2019-06-20 ENCOUNTER — OFFICE VISIT (OUTPATIENT)
Dept: NEUROLOGY | Age: 74
End: 2019-06-20

## 2019-06-20 VITALS
HEART RATE: 118 BPM | WEIGHT: 185.63 LBS | OXYGEN SATURATION: 99 % | HEIGHT: 64 IN | DIASTOLIC BLOOD PRESSURE: 78 MMHG | BODY MASS INDEX: 31.69 KG/M2 | SYSTOLIC BLOOD PRESSURE: 112 MMHG

## 2019-06-20 DIAGNOSIS — G60.9 IDIOPATHIC PERIPHERAL NEUROPATHY: ICD-10-CM

## 2019-06-20 DIAGNOSIS — M47.816 LUMBAR SPONDYLOSIS: ICD-10-CM

## 2019-06-20 DIAGNOSIS — M47.12 CERVICAL SPONDYLOSIS WITH MYELOPATHY: Primary | ICD-10-CM

## 2019-06-20 RX ORDER — BACLOFEN 10 MG/1
TABLET ORAL
Qty: 90 TAB | Refills: 0 | Status: SHIPPED | OUTPATIENT
Start: 2019-06-20 | End: 2019-07-25 | Stop reason: SDUPTHER

## 2019-06-20 NOTE — LETTER
7/8/19 Patient: Tenny Bosworth YOB: 1945 Date of Visit: 6/20/2019 MD Dima Hunter [address] [id number] VIA In Basket Dear Good Aguirre MD, Thank you for referring Ms. Caio Li to [address] [place] [address] for evaluation. My notes for this consultation are attached. If you have questions, please do not hesitate to call me. I look forward to following your patient along with you. Sincerely, Michael Martinez MD

## 2019-06-20 NOTE — PROGRESS NOTES
NEUROLOGY CLINIC NOTE    Patient ID:  Brittanie Phan  [id number]  46 y.o.  1945    Date of Consultation:  June 20, 2019    Referring Physician: Dr. Jazzmine Sheffield    Reason for Consultation:  Gait instability    Chief Complaint   Patient presents with    New Patient     issues with walking, balance       History of Present Illness:     Patient Active Problem List    Diagnosis Date Noted    Acute bronchitis 04/30/2019    Interstitial lung disease ([address] 04/09/2019    Cervical stenosis of spine 03/26/2019    Spinal stenosis, cervical region 03/04/2019    Dyspnea on exertion 02/19/2019    Medicare annual wellness visit, subsequent 01/13/2019    Spinal stenosis, lumbar 07/16/2018    Pre-operative cardiovascular examination 07/11/2018    Lumbar disc disease 07/11/2018    Sleep disturbance 04/09/2018    Primary osteoarthritis involving multiple joints 01/12/2018    Recurrent depression ([address] 01/05/2018    Annual physical exam 10/02/2017    Vitamin D deficiency 10/02/2017    GI bleed 08/24/2017    Overactive bladder 08/24/2017    Polymyalgia rheumatica ([address] 08/24/2017    IBS (irritable bowel syndrome) 08/24/2017    Hypertension with renal disease 08/24/2017    CKD (chronic kidney disease), stage II 08/24/2017    Avascular necrosis of hip ([address] 08/24/2017    Abnormal LFTs 08/24/2017    Controlled type 2 diabetes mellitus with stage 2 chronic kidney disease, with long-term current use of insulin ([address] 04/20/2015    Non-seasonal allergic rhinitis 04/20/2015    Class 1 obesity due to excess calories without serious comorbidity with body mass index (BMI) of 33.0 to 33.9 in adult 04/20/2015    Rosacea 04/20/2015    Mixed hyperlipidemia 04/20/2015    Anxiety 04/20/2015     Past Medical History:   Diagnosis Date    Abnormal LFTs 08/24/2017    FATTY LIVER    Arthritis     OSTEO    Avascular necrosis of hip ([address] 08/24/2017    BILAT HIPS    Breast CA ([address] 1989, 2001    BILATERAL; SURGERY, CHEMO    Chronic pain     CKD (chronic kidney disease), stage II 8/24/2017    Coagulation disorder ([facility] 1984    ITP  (DR CHU BRADSHAW) - PLATELETS DROPPED TO 5K    Depression     Diabetes ([facility]     TYPE 2; NIDDM    DJD (degenerative joint disease), multiple sites 8/24/2017    GI bleed 2008    HEMORRHOIDS    Hyperlipemia     Hypertension with renal disease 8/24/2017    IBS (irritable bowel syndrome) 8/24/2017    Ill-defined condition 2015    PNEUMONIA X2 - HOSPITALIZED IN 2015    Interstitial lung disease ([facility] 04/01/2019    Liver disease     FATTY LIVER    Myalgia 8/24/2017    On statin therapy 8/24/2017    Overactive bladder 8/24/2017    Pneumonia 04/2015    HOSPITALIZED 3 WEEKS.  Polymyalgia rheumatica ([facility] 8/24/2017    Prophylactic antibiotic 8/24/2017    Reflex abnormality     acid reflex    Rosacea       Past Surgical History:   Procedure Laterality Date    BREAST SURGERY PROCEDURE UNLISTED  1993, 2002    RECONSTRUCTION X2    HX APPENDECTOMY  1950    HX BREAST BIOPSY Bilateral     HX CATARACT REMOVAL Bilateral     HX COLONOSCOPY      HX ENDOSCOPY      HX GI      COLONOSCOPY    HX HEENT      WISDOM TEETH    HX HYSTERECTOMY  2000S    HX MASTECTOMY Right 1989    HX MASTECTOMY Left 2001    HX ORTHOPAEDIC  2008    LUMBAR FUSION    HX ORTHOPAEDIC  2018    RE-DO LUMBAR FUSION, AND ADDED THORACIC FUSION    HX ORTHOPAEDIC  03/26/2019    neckectomy    HX TUBAL LIGATION  1981    HX UROLOGICAL  2000s    BLADDER SLING    TOTAL HIP ARTHROPLASTY Left 11/16/2016    ANTERIOR APPROACH, DR Gwendolyn De La Torre; (POSTOP: STANDS ONE INCH TALLER ON LEFT FOOT)    TOTAL HIP ARTHROPLASTY Right 12/2016    ANTERIOR APPROACH (DR Gwendolyn De La Torre)      Prior to Admission medications    Medication Sig Start Date End Date Taking? Authorizing Provider   pantoprazole (PROTONIX) 40 mg tablet Take 40 mg by mouth daily. Yes Provider, Historical   montelukast (SINGULAIR) 10 mg tablet Take 10 mg by mouth daily.    Yes Provider, Historical   pravastatin (PRAVACHOL) 80 mg tablet Take 1 Tab by mouth nightly. 6/7/19  Yes Marissa Rivera MD   oxazepam (SERAX) 15 mg capsule TAKE 2 CAPSULES AT BEDTIME 6/7/19  Yes Marissa Rivera MD   buPROPion XL (WELLBUTRIN XL) 150 mg tablet Take 1 Tab by mouth daily. TAKE 1 TABLET BY MOUTH EVERY DAY 6/7/19  Yes Marissa Rivera MD   benzonatate (TESSALON) 200 mg capsule TAKE 1 CAPSULE THREE TIMES DAILY IF NEEDED FOR COUGH 6/7/19  Yes Marissa Rivera MD   metFORMIN ER (GLUCOPHAGE XR) 500 mg tablet Take two tablets in the morning with breakfast and two tablets in the evening with dinner. 6/7/19  Yes Marissa Rivera MD   albuterol [facility] HFA) 90 mcg/actuation inhaler Take 1 Puff by inhalation every four (4) hours as needed for Wheezing or Shortness of Breath. 5/3/19  Yes Marissa Rivera MD   cholecalciferol (VITAMIN D3) 1,000 unit cap Take  by mouth daily. Indications: unknown of the mg. Yes Provider, Historical   carvedilol (COREG) 6.25 mg tablet Take 0.5 Tabs by mouth daily. 4/24/19  Yes Marissa Rivera MD   biotin-silicon diox-L-cysteine 3,000 mcg -100 mg-50 mg TbER Take 1 Tab by mouth daily. Yes Provider, Historical   glucose blood VI test strips (ACCU-CHEK CHERYL) strip Use once daily 2/11/19  Yes Marissa Rivera MD   hydroCHLOROthiazide (HYDRODIURIL) 25 mg tablet TAKE 1 TABLET EVERY DAY 1/18/19  Yes Marissa Rivera MD   fenofibrate nanocrystallized (TRICOR) 145 mg tablet Take 1 Tab by mouth daily. 1/15/19  Yes Marissa Rivera MD   levocarnitine HCl (ACETYL-L-CARNITINE) Take 500 mg by mouth daily. Yes Provider, Historical   sertraline (ZOLOFT) 100 mg tablet TAKE 1 TABLET BY MOUTH DAILY  Patient taking differently: Take 100 mg by mouth daily. TAKE 1 TABLET BY MOUTH DAILY 7/6/18  Yes Marissa Rivera MD   multivitamin (ONE A DAY) tablet Take 1 Tab by mouth daily.    Yes Provider, Historical   azelaic acid (FINACEA) 15 % topical gel Apply  to affected area two (2) times a day. Yes Provider, Historical   clindamycin (CLINDAGEL) 1 % topical gel Apply  to affected area two (2) times a day. use thin film on affected area   Yes Provider, Historical   multivitamin, tx-iron-ca-min (THERA-M W/ IRON) 9 mg iron-400 mcg tab tablet Take 1 Tab by mouth daily. Provider, Historical   predniSONE (DELTASONE) 20 mg tablet Take 20 mg by mouth daily (with breakfast). 5/3/19   Lis Villagomez MD     Allergies   Allergen Reactions    Statins-Hmg-Coa Reductase Inhibitors Myalgia     Myalgia with  multiple statins  Takes pravachol     Codeine Rash     Rash on thighs    Darvon [Propoxyphene] Other (comments)     \"I see worms\"    Pcn [Penicillins] Rash    Sulfa (Sulfonamide Antibiotics) Rash      Social History     Tobacco Use    Smoking status: Never Smoker    Smokeless tobacco: Never Used   Substance Use Topics    Alcohol use: No      Family History   Problem Relation Age of Onset    Heart Disease Mother     Kidney Disease Mother     Lung Disease Mother         COPD    Diabetes Brother     Pacemaker Brother     Kidney Disease Brother     Hypertension Brother     Anesth Problems Neg Hx         Subjective:      Jean Renee is a 69 y.o. EORS with history of cervical spinal stenosis status post surgery, chronic kidney disease, coagulation disorder, depression, diabetes, hyperlipidemia vascular necrosis bilateral hips, polymyalgia rheumatica, hypertension, interstitial lung disease and IBS who was referred here by Dr. Gallito Cleary for further evaluation of her gait issues. Per patient condition started around December 2018. Patient started having problems with walking. Feels wobbly and stiff. Cervical MRI without contrast done 2/21/2019 revealed straightening of the cervical spine. Grade 1 anterolisthesis of C7 on T1 measuring 3 mm. Grade 1 anterolisthesis of T1 on T2 measuring 3 mm. Diffusely heterogeneous marrow signal without a suspicious focal osseous lesion. C1-C2, there is degenerative changes at the atlantodental joint, with a small cyst at the posterior aspect of the left dens measuring 6 x 3 mm, likely representing a synovial cyst. C2-C3, severe left and mild right facet arthropathy. C3-C4, diffuse disc osteophyte complex with bilateral uncovertebral spurring, right worse than left. Severe bilateral facet arthropathy. Severe right and mild left neural foraminal stenosis. C4-C5, diffuse disc osteophyte complex with bilateral uncovertebral spurring. Severe bilateral facet arthropathy. Mild bilateral neural foraminal stenosis. C5-C6, diffuse disc osteophyte complex with bilateral uncovertebral spurring, right worse than left. Superimposed small central disc extrusion with inferior migration. Severe bilateral facet arthropathy. Moderate spinal canal stenosis with indentation of the ventral cord. Severe bilateral neural foraminal stenosis. C6-C7, diffuse disc osteophyte complex with bilateral uncovertebral spurring. Severe bilateral facet arthropathy. Mild spinal canal stenosis. Severe bilateral neural foraminal stenosis. C7-T1, grade 1 anterolisthesis with uncovering of the disc. Severe bilateral facet arthropathy. Mild spinal canal stenosis. Severe bilateral neural foraminal stenosis. At T2-T3, there is a diffuse disc bulge which results in spinal canal stenosis with indentation of the ventral cord. Severe facet arthropathy is also noted at T2-T3. Patient was seen by orthopedics and underwent cervical surgery 3/26/2019. No benefit with her walking issues. Denies any weakness or loss of muscle bulk. Denies any falls. Better with a cane or walker. She is undergoing PT. She also mentions numbness and tingling left foot. She has had prior lumbar surgeries x 2 (last was 7/2018). Patient was seen by her orthopedic doctor last 5/16/2019. Note mentions patient is 7 weeks post operation. Note mentions it from a cervical spine standpoint she is doing well.   No neck pain.  Patient is not taking any medication for pain relief. She is working with home health PT and OT. Note mentions some slight improvement was still having balance issues. Also breathing seems to be worse. Plan was to continue using soft collar and home PT and OT. Patient was also referred to neurology and rheumatology regarding her balance issues. Patient was last seen by her PCP 6/11/2019. Note mentions issues with diabetic control. It also mentions patient was recently on prednisone for interstitial lung disease. BMP done revealed elevated glucose at 183. Last hemoglobin A1c done 4/16/2019 was elevated at 7.4. Outside reports reviewed: office notes, radiology reports, lab reports. Review of Systems:    A comprehensive review of systems was performed:   Constitutional: positive for fatigue   Eyes: positive for none  Ears, nose, mouth, throat, and face: positive for snoring   Respiratory: positive for cough, shortness of breath  Cardiovascular: positive for none  Gastrointestinal: positive for diarrhea, swallowing difficulty  Genitourinary: positive for incontinence   Integument/breast: positive for none  Hematologic/lymphatic: positive for none  Musculoskeletal: positive for joint pain, muscle pain, weakness   Neurological: positive for memory loss, neuropathy, gait disturbance   Behavioral/Psych: positive for anxiety, depression   Endocrine: positive for none  Allergic/Immunologic: positive for none      Objective:     Visit Vitals  /78   Pulse (!) 118   Ht 5' 4\" (1.626 m)   Wt 185 lb 10 oz (84.2 kg)   SpO2 99%   BMI 31.86 kg/m²       PHYSICAL EXAM:    General Appearance: Alert, patient appears stated age. General:  Obese, patient in no apparent distress. Head/Face: The head is normocephalic and atraumatic. Eyes: Conjunctivae appear normal. Sclera appear normal and non-icteric. Nose (and Sinus):   No abnormality of the nose or sinuses is noted. Oral:   Throat is clear. Lymphatics:  No lymphadenopathy in the neck/head. Neck and Thyroid:   No bruits noted in the neck. Respiratory:  Lungs clear to auscultation. Cardiovascular:  Palpation and auscultation: regular rate and rhythm. Extremity: No joint swelling, erythema or pedal edema. NEUROLOGICAL EXAM:    Appearance: The patient is obese, provides a coherent history and is in no acute distress. Mental Status: Oriented to time, place and person. Fluent, no aphasia or dysarthria. Mood and affect appropriate. Cranial Nerves:   Intact visual fields. MARITZA, EOM's full, no nystagmus, no ptosis. Facial sensation is normal. Corneal reflexes are intact. Facial movement is symmetric. Hearing is normal bilaterally. Palate is midline with normal elevation. Sternocleidomastoid and trapezius muscles are normal. Tongue is midline. Motor:  5/5 strength in upper and lower proximal and distal muscles. Normal bulk and tone. No fasciculations. No pronator drift. Reflexes:   Deep tendon reflexes 1+/4 and symmetrical. Downgoing toes. Sensory:   Normal to cold, pinprick and vibration except decrease vibration toes. Gait:  Spastic and scissoring gait. No Romberg. Problems with tandem walking. Tremor:   No tremor noted. Cerebellar:  Intact FTN/PARMINDER/HTS. Imaging  Cervical MRI: reviewed    Labs Reviewed      Assessment:   Cervical spondylosis and myelopathy  Idiopathic peripheral neuropathy  Lumbar spondylosis    Plan:   Neurological examination reveals spastic and scissoring gait. Findings typically seen in upper motor neuron conditions spinal stenosis with myelopathy. Cervical MRI prior to surgery was reviewed. Reveals significant multilevel spondylosis with neuroforaminal narrowings and spinal stenosis worse at C5-6 and T2-3. Status post surgery. Likely dealing with residual changes from the myelopathy. Discussed trial of medication to see if it improves her walking. Patient agreed.   Trial of baclofen 10 mg at bedtime initially and up to 10 mg 3 times daily if necessary. Prescriptions provided. Exam also reveals decreased vibratory sensation which likely is causing some sensory ataxia on top of the spastic gait. Patient with long-standing history of diabetes. Patient likely has diabetic neuropathy. Discussed direct correlate between control of diabetes and progression of her neuropathy. Fall precautions. Continue to use all necessary equipment to prevent from falling. EMG/NCS of bilateral lower extremity was ordered to further assess. Patient also with history of lumbar spondylosis status post surgeries. This may be contributing to her gait issues as well. EMG/NCS of bilateral lower extremity should also help delineate if there is any underlying lumbar radiculopathy. Continue home PT and OT. Further intervention be done pending results of testing. All questions and concerns were answered. Visit lasted 70 minutes. Greater than 50% was spent reviewing her medical records as summarized above, discussion about her condition, potential etiologies, prognosis, need for further testing, EMG/NCS of bilateral lower extremity, fall precautions, continue home PT and OT, treatment options, medication    This note was created using voice recognition software. Despite editing, there may be syntax errors.

## 2019-06-20 NOTE — PATIENT INSTRUCTIONS
Cervical Myelopathy: Care Instructions Your Care Instructions Cervical myelopathy is a problem caused by pressure on your spinal cord. This pressure may come from a bone spur. Or it may come from a herniated disc. In some cases, aging makes the discs and ligaments around the spine thicker. This can also put pressure on the spinal cord. Cervical myelopathy can cause different symptoms. It may cause numbness and pain. It may also cause weakness in the arms, hands, and legs. For some people, it's hard to walk. Others have neck pain or problems with urination and bowel movements. Treatment depends on the cause and your symptoms. You may need regular checkups or you may need surgery to help take pressure off the nerve. Follow-up care is a key part of your treatment and safety. Be sure to make and go to all appointments, and call your doctor if you are having problems. It's also a good idea to know your test results and keep a list of the medicines you take. How can you care for yourself at home? · Be safe with medicines. Read and follow all instructions on the label. ? If the doctor gave you a prescription medicine for pain, take it as prescribed. ? If you are not taking a prescription pain medicine, ask your doctor if you can take an over-the-counter medicine. · Do exercises recommended by your doctor or physical therapist. Strong muscles can help you do everyday tasks. · If you have trouble using your hands, put Velcro or snaps on clothes. This can make your clothes easier to get on and off. · If you have trouble walking, you can make life easier and safer. Use rails and nonskid tape at home. Try a cane or walker to get around. · Do not smoke. Smoking can slow bone healing. If you need help quitting, talk to your doctor about stop-smoking programs and medicines. These can increase your chances of quitting for good. · Pace yourself. Everyday activities may take longer than before.  Give yourself more time to get things done. This may lower your stress. When should you call for help? Call 911 anytime you think you may need emergency care. For example, call if: 
  · You are unable to move an arm or a leg at all.  
[facility] your doctor now or seek immediate medical care if: 
  · You have new or worse symptoms in your arms, legs, belly, or buttocks. Symptoms may include: 
? Numbness or tingling. ? Weakness. ? Pain.  
  · You lose bladder or bowel control.  
 Watch closely for changes in your health, and be sure to contact your doctor if: 
  · You do not get better as expected. Where can you learn more? Go to http://malia-lincoln.info/. Enter K948 in the search box to learn more about \"Cervical Myelopathy: Care Instructions. \" Current as of: September 20, 2018 Content Version: 11.9 © 8473-6320 [employer]. Care instructions adapted under license by [employer] (which disclaims liability or warranty for this information). If you have questions about a medical condition or this instruction, always ask your healthcare professional. [place] [address] any warranty or liability for your use of this information. Neuropathic Pain: Care Instructions Your Care Instructions Neuropathic pain is caused by pressure on or damage to your nerves. It's often simply called nerve pain. Some people feel this type of pain all the time. For others, it comes and goes. Diabetes, shingles, or an injury can cause nerve pain. Many people say the pain feels sharp, burning, or stabbing. But some people feel it as a dull ache. In some cases, it makes your skin very sensitive. So touch, pressure, and other sensations that did not hurt before may now cause pain. It's important to know that this kind of pain is real and can affect your quality of life. It's also important to know that treatment can help. Treatment includes pain medicines, exercise, and physical therapy. Medicines can help reduce the number of pain signals that travel over the nerves. This can make the painful areas less sensitive. It can also help you sleep better and improve your mood. But medicines are only one part of successful treatment. Most people do best with more than one kind of treatment. Your doctor may recommend that you try cognitive-behavioral therapy and stress management. Or, if needed, you may decide to try to quit smoking, lower your blood pressure, or better control blood sugar. These kinds of healthy changes can also make a difference. If you feel that your treatment is not working, talk to your doctor. And be sure to tell your doctor if you think you might be depressed or anxious. These are common problems that can also be treated. Follow-up care is a key part of your treatment and safety. Be sure to make and go to all appointments, and call your doctor if you are having problems. It's also a good idea to know your test results and keep a list of the medicines you take. How can you care for yourself at home? · Be safe with medicines. Read and follow all instructions on the label. ? If the doctor gave you a prescription medicine for pain, take it as prescribed. ? If you are not taking a prescription pain medicine, ask your doctor if you can take an over-the-counter medicine. · Save hard tasks for days when you have less pain. Follow a hard task with an easy task. And remember to take breaks. · Relax, and reduce stress. You may want to try deep breathing or meditation. These can help. · Keep moving. Gentle, daily exercise can help reduce pain. Your doctor or physical therapist can tell you what type of exercise is best for you. This may include walking, swimming, and stationary biking. It may also include stretches and range-of-motion exercises. · Try heat, cold packs, and massage. · Get enough sleep. Constant pain can make you more tired. If the pain makes it hard to sleep, talk with your doctor. · Think positively. Your thoughts can affect your pain. Do fun things to distract yourself from the pain. See a movie, read a book, listen to music, or spend time with a friend. · Keep a pain diary. Try to write down how strong your pain is and what it feels like. Also try to notice and write down how your moods, thoughts, sleep, activities, and medicine affect your pain. These notes can help you and your doctor find the best ways to treat your pain. Reducing constipation caused by pain medicine Pain medicines often cause constipation. To reduce constipation: 
· Include fruits, vegetables, beans, and whole grains in your diet each day. These foods are high in fiber. · Drink plenty of fluids, enough so that your urine is light yellow or clear like water. If you have kidney, heart, or liver disease and have to limit fluids, talk with your doctor before you increase the amount of fluids you drink. · Get some exercise every day. Build up slowly to 30 to 60 minutes a day on 5 or more days of the week. · Take a fiber supplement, such as Citrucel or Metamucil, every day if needed. Read and follow all instructions on the label. · Schedule time each day for a bowel movement. Having a daily routine may help. Take your time and do not strain when having a bowel movement. · Ask your doctor about a laxative. The goal is to have one easy bowel movement every 1 to 2 days. Do not let constipation go untreated for more than 3 days. When should you call for help? Call your doctor now or seek immediate medical care if: 
  · You feel sad, anxious, or hopeless for more than a few days. This could mean you are depressed. Depression is common in people who have a lot of pain. But it can be treated.  
  · You have trouble with bowel movements, such as: 
? No bowel movement in 3 days. ? Blood in the anal area, in your stool, or on the toilet paper. ? Diarrhea for more than 24 hours.  
 Watch closely for changes in your health, and be sure to contact your doctor if: 
  · Your pain is getting worse.  
  · You can't sleep because of pain.  
  · You are very worried or anxious about your pain.  
  · You have trouble taking your pain medicine.  
  · You have any concerns about your pain medicine or its side effects.  
  · You have vomiting or cramps for more than 2 hours. Where can you learn more? Go to http://malia-lincoln.info/. Enter N261 in the search box to learn more about \"Neuropathic Pain: Care Instructions. \" Current as of: Etelvina 3, 2018 Content Version: 11.9 © 3285-3541 [employer]. Care instructions adapted under license by [employer] (which disclaims liability or warranty for this information). If you have questions about a medical condition or this instruction, always ask your healthcare professional. [place] [address] any warranty or liability for your use of this information. Low Back Arthritis: Exercises Your Care Instructions Here are some examples of typical rehabilitation exercises for your condition. Start each exercise slowly. Ease off the exercise if you start to have pain. Your doctor or physical therapist will tell you when you can start these exercises and which ones will work best for you. When you are not being active, find a comfortable position for rest. Some people are comfortable on the floor or a medium-firm bed with a small pillow under their head and another under their knees. Some people prefer to lie on their side with a pillow between their knees. Don't stay in one position for too long. Take short walks (10 to 20 minutes) every 2 to 3 hours. Avoid slopes, hills, and stairs until you feel better. Walk only distances you can manage without pain, especially leg pain. How to do the exercises Pelvic tilt 1. Lie on your back with your knees bent. 2. \"Brace\" your stomachtighten your muscles by pulling in and imagining your belly button moving toward your spine. 3. Press your lower back into the floor. You should feel your hips and pelvis rock back. 4. Hold for 6 seconds while breathing smoothly. 5. Relax and allow your pelvis and hips to rock forward. 6. Repeat 8 to 12 times. Back stretches 1. Get down on your hands and knees on the floor. 2. Relax your head and allow it to droop. Round your back up toward the ceiling until you feel a nice stretch in your upper, middle, and lower back. Hold this stretch for as long as it feels comfortable, or about 15 to 30 seconds. 3. Return to the starting position with a flat back while you are on your hands and knees. 4. Let your back sway by pressing your stomach toward the floor. Lift your buttocks toward the ceiling. 5. Hold this position for 15 to 30 seconds. 6. Repeat 2 to 4 times. Follow-up care is a key part of your treatment and safety. Be sure to make and go to all appointments, and call your doctor if you are having problems. It's also a good idea to know your test results and keep a list of the medicines you take. Where can you learn more? Go to http://malia-lincoln.info/. Enter N511 in the search box to learn more about \"Low Back Arthritis: Exercises. \" Current as of: September 20, 2018 Content Version: 11.9 © 6731-7180 [employer]. Care instructions adapted under license by [employer] (which disclaims liability or warranty for this information). If you have questions about a medical condition or this instruction, always ask your healthcare professional. [address] any warranty or liability for your use of this information. Preventing Falls: Care Instructions Your Care Instructions Getting around your home safely can be a challenge if you have injuries or health problems that make it easy for you to fall. Loose rugs and furniture in walkways are among the dangers for many older people who have problems walking or who have poor eyesight. People who have conditions such as arthritis, osteoporosis, or dementia also have to be careful not to fall. You can make your home safer with a few simple measures. Follow-up care is a key part of your treatment and safety. Be sure to make and go to all appointments, and call your doctor if you are having problems. It's also a good idea to know your test results and keep a list of the medicines you take. How can you care for yourself at home? Taking care of yourself · You may get dizzy if you do not drink enough water. To prevent dehydration, drink plenty of fluids, enough so that your urine is light yellow or clear like water. Choose water and other caffeine-free clear liquids. If you have kidney, heart, or liver disease and have to limit fluids, talk with your doctor before you increase the amount of fluids you drink. · Exercise regularly to improve your strength, muscle tone, and balance. Walk if you can. Swimming may be a good choice if you cannot walk easily. · Have your vision and hearing checked each year or any time you notice a change. If you have trouble seeing and hearing, you might not be able to avoid objects and could lose your balance. · Know the side effects of the medicines you take. Ask your doctor or pharmacist whether the medicines you take can affect your balance. Sleeping pills or sedatives can affect your balance. · Limit the amount of alcohol you drink. Alcohol can impair your balance and other senses. · Ask your doctor whether calluses or corns on your feet need to be removed. If you wear loose-fitting shoes because of calluses or corns, you can lose your balance and fall. · Talk to your doctor if you have numbness in your feet. Preventing falls at home · Remove raised doorway thresholds, throw rugs, and clutter. Repair loose carpet or raised areas in the floor. · Move furniture and electrical cords to keep them out of walking paths. · Use nonskid floor wax, and wipe up spills right away, especially on ceramic tile floors. · If you use a walker or cane, put rubber tips on it. If you use crutches, clean the bottoms of them regularly with an abrasive pad, such as steel wool. · Keep your house well lit, especially [place], and outside walkways. Use night-lights in areas such as hallways and bathrooms. Add extra light switches or use remote switches (such as switches that go on or off when you clap your hands) to make it easier to turn lights on if you have to get up during the night. · Install sturdy handrails on stairways. · Move items in your cabinets so that the things you use a lot are on the lower shelves (about waist level). · Keep a cordless phone and a flashlight with new batteries by your bed. If possible, put a phone in each of the main rooms of your house, or carry a cell phone in case you fall and cannot reach a phone. Or, you can wear a device around your neck or wrist. You push a button that sends a signal for help. · Wear low-heeled shoes that fit well and give your feet good support. Use footwear with nonskid soles. Check the heels and soles of your shoes for wear. Repair or replace worn heels or soles. · Do not wear socks without shoes on wood floors. · Walk on the grass when the sidewalks are slippery. If you live in an area that gets snow and ice in the winter, sprinkle salt on slippery steps and sidewalks. Preventing falls in the bath · Install grab bars and nonskid mats inside and outside your shower or tub and near the toilet and sinks. · Use shower chairs and bath benches. · Use a hand-held shower head that will allow you to sit while showering. · Get into a tub or shower by putting the weaker leg in first. Get out of a tub or shower with your strong side first. 
· Repair loose toilet seats and consider installing a raised toilet seat to make getting on and off the toilet easier. · Keep your bathroom door unlocked while you are in the shower. Where can you learn more? Go to http://malia-lincoln.info/. Enter [id number] [id number] in the search box to learn more about \"Preventing Falls: Care Instructions. \" Current as of: March 15, 2018 Content Version: 11.9 © 9747-7933 [employer]. Care instructions adapted under license by [employer] (which disclaims liability or warranty for this information). If you have questions about a medical condition or this instruction, always ask your healthcare professional. [address] any warranty or liability for your use of this information.

## 2019-06-21 ENCOUNTER — OFFICE VISIT (OUTPATIENT)
Dept: NEUROLOGY | Age: 74
End: 2019-06-21

## 2019-06-21 VITALS
SYSTOLIC BLOOD PRESSURE: 125 MMHG | BODY MASS INDEX: 31.86 KG/M2 | HEART RATE: 99 BPM | DIASTOLIC BLOOD PRESSURE: 85 MMHG | HEIGHT: 64 IN | OXYGEN SATURATION: 97 %

## 2019-06-21 DIAGNOSIS — M47.816 LUMBAR SPONDYLOSIS: ICD-10-CM

## 2019-06-21 DIAGNOSIS — R26.81 GAIT INSTABILITY: Primary | ICD-10-CM

## 2019-06-21 NOTE — PROCEDURES
[facility] at [facility]        Tel: [phone] ? Fax: [phone]    ELECTRODIAGNOSTIC REPORT      Test Date:  2019    Patient: Kyle Streeter : 1945 Physician: Natasha Matson   ID#: [id number] SEX: Female Ref. Phys: Natasha Matson     Patient History / Exam:  Gait instability with left foot paresthesia. History of DM, cervical and lumbar myelopathy s/p surgeries. Exam reveals decrease vibration bilateral toes, truncal instability, unsteady and spastic gait with scissoring. Assess for neuropathy vs lumbar radiculopathy. EMG & NCV Findings:  Sensory and motor nerve conduction studies (as indicated in the tables) were within reference of normal.       Disposable concentric needle EMG (as indicated in the table) showed no evidence of electrical instability. Impression: This study is normal. There is no electrodiagnostic evidence of a generalized large fiber neuropathy, myopathy or significant residual lumbar radiculopathy at this time.     Natasha Matson MD    Nerve Conduction Studies  Anti Sensory Summary Table     Stim Site NR Peak (ms) Norm Peak (ms) P-T Amp (µV) Norm P-T Amp Site1 Site2 Dist (cm)   Left Sup Fibular Anti Sensory (Lat ankle)  30.1°C   Lower leg    3.0 <4.6 4.3 >4 Lower leg Lat ankle 10.0   Right Sup Fibular Anti Sensory (Lat ankle)  30.8°C   Lower leg    2.8 <4.6 4.6 >4 Lower leg Lat ankle 10.0   Left Sural Anti Sensory (Lat Mall)  30°C   Calf    4.0 <4.5 7.1 >4.0 Calf Lat Mall 14.0   Right Sural Anti Sensory (Lat Mall)  30.4°C   Calf    4.0 <4.5 6.9 >4.0 Calf Lat Mall 14.0     Motor Summary Table     Stim Site NR Onset (ms) Norm Onset (ms) O-P Amp (mV) Norm O-P Amp P-T Amp (mV) Site1 Site2 Dist (cm) Emanuel (m/s)   Left Fibular Motor (Ext Dig Brev)  30.2°C   Ankle    4.8 <6.5 3.5 >1.1 5.1 Ankle Ext Dig Brev 8.0    B Fib    11.2  4.0  5.9 B Fib Ankle 28.5 45   Poplt    13.2  4.3  6.6 Poplt B Fib 10.0 50   Right Fibular Motor (Ext Dig Brev)  30.3°C Ankle    4.4 <6.5 3.6 >1.1 5.7 Ankle Ext Dig Brev 8.0    B Fib    10.5  3.3  5.1 B Fib Ankle 28.0 46   Poplt    12.4  3.1  5.0 Poplt B Fib 10.0 53   Left Tibial Motor (Abd Narvaez Brev)  30.2°C   Ankle    5.7 <6.1 12.7 >1.1 20.5 Ankle Abd Narvaez Brev 8.0    Knee    14.8  9.3  13.6 Knee Ankle 38.7 43   Right Tibial Motor (Abd Narvaez Brev)  30.2°C   Ankle    5.6 <6.1 11.8 >1.1 18.4 Ankle Abd Narvaez Brev 8.0    Knee    14.9  7.7  12.5 Knee Ankle 38.7 42       EMG     Side Muscle Nerve Root Ins Act Fibs Psw Recrt Duration Amp Poly Comment   Left VastusLat Femoral L2-4 Nml Nml Nml Nml Nml Nml Nml    Left MedGastroc Tibial S1-2 Nml Nml Nml Nml Nml Nml Nml    Left AntTibialis Dp Br Peron L4-5 Nml Nml Nml Nml Nml Nml Nml    Left Peroneus Long   Nml Nml Nml Nml Nml Nml Nml    Left TensorFascLat SupGluteal L4-5, S1 Nml Nml Nml Nml Nml Nml Nml      Waveforms:

## 2019-07-23 NOTE — PROGRESS NOTES
Chief Complaint   Patient presents with    Diabetes    Cholesterol Problem     3 month f/up    Hypertension    Chronic Kidney Disease    Obesity       SUBJECTIVE:    Chiquita Bullock is a 69 y.o. female who returns in follow-up of her medical problems include hypertension, diabetes, hyperlipidemia, CKD stage II, morbid obesity, urinary incontinence with overactive bladder, fecal incontinence, interstitial lung disease, allergic rhinitis, DJD, anxiety with depression and other multiple medical problems. She is having continued problems with some fecal incontinence and urinary incontinence which she thought her cervical spine surgery would help and it has not made a difference. She does take metformin thousand milligrams twice a day. She is previously tried medications for the urinary incontinence which have not been of benefit. She was recently seen by neurology Dr. Anisha Barahona and placed on baclofen to try to help some with her gait and she is 1 if she needs to continue with the cyclobenzaprine being on that. She also has been seen by the dermatologist and placed on l-carnitine and she is wondering if that is beneficial.  She has been placed on Singulair previously by Dr. Jose Roberto Ovalle from pulmonary but he wants to come up with that so he can do allergy testing. He also placed on Protonix to treat the possibility of reflux but she does not feel she has reflux and will like to stop that. She only takes a half of 1 Coreg tablet daily and that is a 6.25 mg tablet and she is curious that she needs to continue that. She denies any chest pain, shortness of breath, palpitations, PND, orthopnea or other cardiorespiratory complaints. She denies any GI or  complaints except for the incontinence as noted. She denies any headaches, dizziness or neurologic complaints. She does have her chronic arthritic complaints which have not seem to change.   She was seen by psychiatrist many years ago and placed on Prozac which was subsequent switched to Zoloft and eventually became to generic as well as subsequent was added to Wellbutrin about 10 years ago and she is curious that she needs to be on both of those still. Overall she is trying to decrease her medication load and see if that she will feel better with that. She denies any other specific complaints. Current Outpatient Medications   Medication Sig Dispense Refill    glimepiride (AMARYL) 2 mg tablet Take 1 Tab by mouth every morning. 90 Tab prn    baclofen (LIORESAL) 10 mg tablet Take 1 tab at bedtime x 1 wk; then 1 tab BID x 1 wk; then 1 tab TID 90 Tab 0    multivitamin, tx-iron-ca-min (THERA-M W/ IRON) 9 mg iron-400 mcg tab tablet Take 1 Tab by mouth daily.  pantoprazole (PROTONIX) 40 mg tablet Take 40 mg by mouth daily.  montelukast (SINGULAIR) 10 mg tablet Take 10 mg by mouth daily.  pravastatin (PRAVACHOL) 80 mg tablet Take 1 Tab by mouth nightly. 90 Tab 3    oxazepam (SERAX) 15 mg capsule TAKE 2 CAPSULES AT BEDTIME 60 Cap 2    buPROPion XL (WELLBUTRIN XL) 150 mg tablet Take 1 Tab by mouth daily. TAKE 1 TABLET BY MOUTH EVERY DAY 90 Tab prn    benzonatate (TESSALON) 200 mg capsule TAKE 1 CAPSULE THREE TIMES DAILY IF NEEDED FOR COUGH 30 Cap 0    metFORMIN ER (GLUCOPHAGE XR) 500 mg tablet Take two tablets in the morning with breakfast and two tablets in the evening with dinner. 270 Tab 3    albuterol (PROAIR HFA) 90 mcg/actuation inhaler Take 1 Puff by inhalation every four (4) hours as needed for Wheezing or Shortness of Breath. 1 Inhaler prn    cholecalciferol (VITAMIN D3) 1,000 unit cap Take  by mouth daily. Indications: unknown of the mg.  carvedilol (COREG) 6.25 mg tablet Take 0.5 Tabs by mouth daily. 45 Tab 5    biotin-silicon diox-L-cysteine 3,000 mcg -100 mg-50 mg TbER Take 1 Tab by mouth daily.       glucose blood VI test strips (ACCU-CHEK CHERYL) strip Use once daily 50 Strip prn    hydroCHLOROthiazide (HYDRODIURIL) 25 mg tablet TAKE 1 TABLET EVERY DAY 90 Tab 3    fenofibrate nanocrystallized (TRICOR) 145 mg tablet Take 1 Tab by mouth daily. 30 Tab 11    levocarnitine HCl (ACETYL-L-CARNITINE) Take 500 mg by mouth daily.  sertraline (ZOLOFT) 100 mg tablet TAKE 1 TABLET BY MOUTH DAILY (Patient taking differently: Take 100 mg by mouth daily. TAKE 1 TABLET BY MOUTH DAILY) 90 Tab 3    multivitamin (ONE A DAY) tablet Take 1 Tab by mouth daily.  azelaic acid (FINACEA) 15 % topical gel Apply  to affected area two (2) times a day.  clindamycin (CLINDAGEL) 1 % topical gel Apply  to affected area two (2) times a day. use thin film on affected area      predniSONE (DELTASONE) 20 mg tablet Take 20 mg by mouth daily (with breakfast). 27 Tab 0     Past Medical History:   Diagnosis Date    Abnormal LFTs 08/24/2017    FATTY LIVER    Arthritis     OSTEO    Avascular necrosis of hip ([address] 08/24/2017    BILAT HIPS    Breast CA ([facility] 1989, 2001    BILATERAL; SURGERY, CHEMO    Chronic pain     CKD (chronic kidney disease), stage II 8/24/2017    Coagulation disorder ([address] 1984    ITP  (DR CHU BRADSHAW) - PLATELETS DROPPED TO 5K    Depression     Diabetes ([address]     TYPE 2; NIDDM    DJD (degenerative joint disease), multiple sites 8/24/2017    GI bleed 2008    HEMORRHOIDS    Hyperlipemia     Hypertension with renal disease 8/24/2017    IBS (irritable bowel syndrome) 8/24/2017    Ill-defined condition 2015    PNEUMONIA X2 - HOSPITALIZED IN 2015    Interstitial lung disease ([address] 04/01/2019    Liver disease     FATTY LIVER    Myalgia 8/24/2017    On statin therapy 8/24/2017    Overactive bladder 8/24/2017    Pneumonia 04/2015    HOSPITALIZED 3 WEEKS.     Polymyalgia rheumatica ([address] 8/24/2017    Prophylactic antibiotic 8/24/2017    Reflex abnormality     acid reflex    Rosacea      Past Surgical History:   Procedure Laterality Date    BREAST SURGERY PROCEDURE UNLISTED  1993, 2002    RECONSTRUCTION X2    HX APPENDECTOMY  1950    HX BREAST BIOPSY Bilateral     HX CATARACT REMOVAL Bilateral     HX COLONOSCOPY      HX ENDOSCOPY      HX GI      COLONOSCOPY    HX HEENT      WISDOM TEETH    HX HYSTERECTOMY  2000S    HX MASTECTOMY Right 1989    HX MASTECTOMY Left 2001    HX ORTHOPAEDIC  2008    LUMBAR FUSION    HX ORTHOPAEDIC  2018    RE-DO LUMBAR FUSION, AND ADDED THORACIC FUSION    HX ORTHOPAEDIC  03/26/2019    neckectomy    HX TUBAL LIGATION  1981    HX UROLOGICAL  2000s    BLADDER SLING    TOTAL HIP ARTHROPLASTY Left 11/16/2016    ANTERIOR APPROACH, DR Gwendolyn De La Torre; (POSTOP: STANDS ONE INCH TALLER ON LEFT FOOT)    TOTAL HIP ARTHROPLASTY Right 12/2016    ANTERIOR APPROACH (DR JAIME)     Allergies   Allergen Reactions    Statins-Hmg-Coa Reductase Inhibitors Myalgia     Myalgia with  multiple statins  Takes pravachol     Codeine Rash     Rash on thighs    Darvon [Propoxyphene] Other (comments)     \"I see worms\"    Pcn [Penicillins] Rash    Sulfa (Sulfonamide Antibiotics) Rash       REVIEW OF SYSTEMS:  General: negative for - chills or fever, or weight loss or gain  ENT: negative for - headaches, nasal congestion or tinnitus  Eyes: no blurred or visual changes  Neck: No stiffness or swollen nodes  Respiratory: negative for - cough, hemoptysis, shortness of breath or wheezing  Cardiovascular : negative for - chest pain, edema, palpitations or shortness of breath  Gastrointestinal: negative for - abdominal pain, blood in stools, heartburn or nausea/vomiting GERD positive for some fecal incontinence  Genito-Urinary: no dysuria, trouble voiding, or hematuria.   Positive for some urinary incontinence  Musculoskeletal: negative for - gait disturbance, change of her chronic joint pain, joint stiffness or joint swelling  Neurological: no TIA or stroke symptoms  Hematologic: no bruises, no bleeding  Lymphatic: no swollen glands  Integument: no lumps, mole changes, nail changes or rash  Endocrine:no malaise/lethargy poly uria or polydipsia or unexpected weight changes        Social History     Socioeconomic History    Marital status:      Spouse name: Not on file    Number of children: Not on file    Years of education: Not on file    Highest education level: Not on file   Tobacco Use    Smoking status: Never Smoker    Smokeless tobacco: Never Used   Substance and Sexual Activity    Alcohol use: No    Drug use: No    Sexual activity: Never     Family History   Problem Relation Age of Onset    Heart Disease Mother     Kidney Disease Mother     Lung Disease Mother         COPD    Diabetes Brother     Pacemaker Brother     Kidney Disease Brother     Hypertension Brother     Anesth Problems Neg Hx        OBJECTIVE:     Visit Vitals  /78 (BP 1 Location: Left arm, BP Patient Position: Sitting)   Pulse 96   Temp 99.2 °F (37.3 °C)   Resp 18   Ht 5' 4\" (1.626 m)   Wt 188 lb (85.3 kg)   SpO2 94%   BMI 32.27 kg/m²     CONSTITUTIONAL:   well nourished, appears age appropriate  EYES: sclera anicteric, PERRL, EOMI  ENMT:nares clear, moist mucous membranes, pharynx clear  NECK: supple. Thyroid normal, No JVD or bruits  RESPIRATORY: Chest: clear to ascultation and percussion, normal inspiratory effort  CARDIOVASCULAR: Heart: regular rate and rhythm no murmurs, rubs or gallops, PMI not displaced, No thrills  GASTROINTESTINAL: Abdomen: non distended, soft, non tender, bowel sounds normal  HEMATOLOGIC: no purpura, petechiae or bruising  LYMPHATIC: No lymph node enlargemant  MUSCULOSKELETAL: Extremities: no edema or active synovitis, pulse 1+   INTEGUMENT: No unusual rashes or suspicious skin lesions noted. Nails appear normal.  PERIPHERAL VASCULAR: normal pulses femoral, PT and DP  NEUROLOGIC: non-focal exam, A & O X 3  PSYCHIATRIC:, appropriate affect     ASSESSMENT:   1. Hypertension with renal disease    2. Controlled type 2 diabetes mellitus with stage 2 chronic kidney disease, with long-term current use of insulin ([address]    3.  Mixed hyperlipidemia    4. CKD (chronic kidney disease), stage II    5. Interstitial lung disease ([address]    6. Primary osteoarthritis involving multiple joints    7. Class 1 obesity due to excess calories without serious comorbidity with body mass index (BMI) of 33.0 to 33.9 in adult    8. Overactive bladder    9. Anxiety      Impression  1. Hypertension that is controlled very well and she is only taken a half of the Coreg tablet daily thus only taken 3.125 once daily as I think at this point we will stop that. 2.  Diabetes mellitus she is having some incontinence of stool and metformin thousand twice daily could certainly be causing that so I am going to decrease that to 500 mg twice daily and add Amaryl 2 mg daily. 3.  Hyperlipidemia repeat status pending and prior lab reviewed and I will make adjustments if necessary. 4.  CKD stage II repeat status pending next #5 interstitial lung disease as noted she has stopped Singulair and the plan is to do allergy testing which I think is reasonable. She wants to stop the Protonix and see if her cough comes back and I think is reasonable to give that a try. 6.  DJD that is currently stable but I agree she does not need to muscle relaxant so she will continue with the baclofen and discontinued the cyclobenzaprine. 7.  Obesity we did discuss diet, exercise and weight reduction for overall health benefit  8. Overactive bladder with urinary incontinence I will set her up for a GYN urology evaluation. 9.  Anxiety with depression we can certainly try weaning off of the Wellbutrin which she is on 150 mg once daily and we will decrease that to a half a tablet daily for 2 weeks and then a half a tablet every other day before discontinuing and she will continue with the Zoloft. 10.  Fecal incontinence we will see if that improves by having the metformin dose to 500 twice daily  I will call with lab results and make further recommendations or adjustments if necessary.   I will see her again in 3 months or sooner should to be a problem. She will continue to monitor blood sugars. She will also stop the l-carnitine. 45 minutes spent on this office visit today with extensive review of all of her medications and changes as noted in discussion of her problems as noted. PLAN:  .  Orders Placed This Encounter    METABOLIC PANEL, COMPREHENSIVE ([facility])    LIPID PANEL ([facility])    CK ([facility])    HEMOGLOBIN A1C W/O EAG ([facility])    REFERRAL TO FEMALE PELVIC MEDICINE AND RECONSTRUCTIVE SURGERY    glimepiride (AMARYL) 2 mg tablet         ATTENTION:   This medical record was transcribed using an electronic medical records system. Although proofread, it may and can contain electronic and spelling errors. Other human spelling and other errors may be present. Corrections may be executed at a later time. Please feel free to contact us for any clarifications as needed. Follow-up and Dispositions    · Return in about 3 months (around 10/24/2019). No results found for any visits on 07/24/19. Devaughn Barth MD    The patient verbalized understanding of the problems and plans as explained.

## 2019-07-24 ENCOUNTER — OFFICE VISIT (OUTPATIENT)
Dept: INTERNAL MEDICINE CLINIC | Age: 74
End: 2019-07-24

## 2019-07-24 VITALS
TEMPERATURE: 99.2 F | WEIGHT: 188 LBS | HEART RATE: 96 BPM | OXYGEN SATURATION: 94 % | HEIGHT: 64 IN | SYSTOLIC BLOOD PRESSURE: 102 MMHG | DIASTOLIC BLOOD PRESSURE: 78 MMHG | BODY MASS INDEX: 32.1 KG/M2 | RESPIRATION RATE: 18 BRPM

## 2019-07-24 DIAGNOSIS — E66.09 CLASS 1 OBESITY DUE TO EXCESS CALORIES WITHOUT SERIOUS COMORBIDITY WITH BODY MASS INDEX (BMI) OF 33.0 TO 33.9 IN ADULT: ICD-10-CM

## 2019-07-24 DIAGNOSIS — F41.9 ANXIETY: ICD-10-CM

## 2019-07-24 DIAGNOSIS — M15.9 PRIMARY OSTEOARTHRITIS INVOLVING MULTIPLE JOINTS: ICD-10-CM

## 2019-07-24 DIAGNOSIS — N32.81 OVERACTIVE BLADDER: ICD-10-CM

## 2019-07-24 DIAGNOSIS — J84.9 INTERSTITIAL LUNG DISEASE (HCC): ICD-10-CM

## 2019-07-24 DIAGNOSIS — N18.2 CKD (CHRONIC KIDNEY DISEASE), STAGE II: ICD-10-CM

## 2019-07-24 DIAGNOSIS — E78.2 MIXED HYPERLIPIDEMIA: ICD-10-CM

## 2019-07-24 DIAGNOSIS — Z79.4 CONTROLLED TYPE 2 DIABETES MELLITUS WITH STAGE 2 CHRONIC KIDNEY DISEASE, WITH LONG-TERM CURRENT USE OF INSULIN (HCC): ICD-10-CM

## 2019-07-24 DIAGNOSIS — N18.2 CONTROLLED TYPE 2 DIABETES MELLITUS WITH STAGE 2 CHRONIC KIDNEY DISEASE, WITH LONG-TERM CURRENT USE OF INSULIN (HCC): ICD-10-CM

## 2019-07-24 DIAGNOSIS — E11.22 CONTROLLED TYPE 2 DIABETES MELLITUS WITH STAGE 2 CHRONIC KIDNEY DISEASE, WITH LONG-TERM CURRENT USE OF INSULIN (HCC): ICD-10-CM

## 2019-07-24 DIAGNOSIS — I12.9 HYPERTENSION WITH RENAL DISEASE: Primary | ICD-10-CM

## 2019-07-24 LAB
A-G RATIO,AGRAT: 1.7 RATIO
ALBUMIN SERPL-MCNC: 4.7 G/DL (ref 3.9–5.4)
ALP SERPL-CCNC: 86 U/L (ref 38–126)
ALT SERPL-CCNC: 64 U/L (ref 9–52)
ANION GAP SERPL CALC-SCNC: 15 MMOL/L
AST SERPL W P-5'-P-CCNC: 37 U/L (ref 14–36)
BILIRUB SERPL-MCNC: 0.6 MG/DL (ref 0.2–1.3)
BUN SERPL-MCNC: 21 MG/DL (ref 7–17)
BUN/CREATININE RATIO,BUCR: 26 RATIO
CALCIUM SERPL-MCNC: 10.2 MG/DL (ref 8.4–10.2)
CHLORIDE SERPL-SCNC: 99 MMOL/L (ref 98–107)
CHOL/HDL RATIO,CHHD: 4 RATIO (ref 0–4)
CHOLEST SERPL-MCNC: 195 MG/DL (ref 0–200)
CK SERPL-CCNC: 42 U/L (ref 30–135)
CO2 SERPL-SCNC: 29 MMOL/L (ref 22–32)
CREAT SERPL-MCNC: 0.8 MG/DL (ref 0.7–1.2)
GLOBULIN,GLOB: 2.7
GLUCOSE SERPL-MCNC: 209 MG/DL (ref 65–105)
HBA1C MFR BLD HPLC: 7.5 % (ref 4–5.7)
HDLC SERPL-MCNC: 55 MG/DL (ref 35–130)
LDL/HDL RATIO,LDHD: 2 RATIO
LDLC SERPL CALC-MCNC: 109 MG/DL (ref 0–130)
POTASSIUM SERPL-SCNC: 4.4 MMOL/L (ref 3.6–5)
PROT SERPL-MCNC: 7.4 G/DL (ref 6.3–8.2)
SODIUM SERPL-SCNC: 143 MMOL/L (ref 137–145)
TRIGL SERPL-MCNC: 154 MG/DL (ref 0–200)
VLDLC SERPL CALC-MCNC: 31 MG/DL

## 2019-07-24 RX ORDER — GLIMEPIRIDE 2 MG/1
2 TABLET ORAL
Qty: 90 TAB | Status: SHIPPED | OUTPATIENT
Start: 2019-07-24 | End: 2019-08-09 | Stop reason: SDUPTHER

## 2019-07-24 RX ORDER — METFORMIN HYDROCHLORIDE 500 MG/1
TABLET, EXTENDED RELEASE ORAL
Qty: 270 TAB | Refills: 3
Start: 2019-07-24 | End: 2019-08-09 | Stop reason: ALTCHOICE

## 2019-07-24 NOTE — PATIENT INSTRUCTIONS
High Blood Pressure: Care Instructions  Overview    It's normal for blood pressure to go up and down throughout the day. But if it stays up, you have high blood pressure. Another name for high blood pressure is hypertension. Despite what a lot of people think, high blood pressure usually doesn't cause headaches or make you feel dizzy or lightheaded. It usually has no symptoms. But it does increase your risk of stroke, heart attack, and other problems. You and your doctor will talk about your risks of these problems based on your blood pressure. Your doctor will give you a goal for your blood pressure. Your goal will be based on your health and your age. Lifestyle changes, such as eating healthy and being active, are always important to help lower blood pressure. You might also take medicine to reach your blood pressure goal.  Follow-up care is a key part of your treatment and safety. Be sure to make and go to all appointments, and call your doctor if you are having problems. It's also a good idea to know your test results and keep a list of the medicines you take. How can you care for yourself at home? Medical treatment  · If you stop taking your medicine, your blood pressure will go back up. You may take one or more types of medicine to lower your blood pressure. Be safe with medicines. Take your medicine exactly as prescribed. Call your doctor if you think you are having a problem with your medicine. · Talk to your doctor before you start taking aspirin every day. Aspirin can help certain people lower their risk of a heart attack or stroke. But taking aspirin isn't right for everyone, because it can cause serious bleeding. · See your doctor regularly. You may need to see the doctor more often at first or until your blood pressure comes down. · If you are taking blood pressure medicine, talk to your doctor before you take decongestants or anti-inflammatory medicine, such as ibuprofen.  Some of these medicines can raise blood pressure. · Learn how to check your blood pressure at home. Lifestyle changes  · Stay at a healthy weight. This is especially important if you put on weight around the waist. Losing even 10 pounds can help you lower your blood pressure. · If your doctor recommends it, get more exercise. Walking is a good choice. Bit by bit, increase the amount you walk every day. Try for at least 30 minutes on most days of the week. You also may want to swim, bike, or do other activities. · Avoid or limit alcohol. Talk to your doctor about whether you can drink any alcohol. · Try to limit how much sodium you eat to less than 2,300 milligrams (mg) a day. Your doctor may ask you to try to eat less than 1,500 mg a day. · Eat plenty of fruits (such as bananas and oranges), vegetables, legumes, whole grains, and low-fat dairy products. · Lower the amount of saturated fat in your diet. Saturated fat is found in animal products such as milk, cheese, and meat. Limiting these foods may help you lose weight and also lower your risk for heart disease. · Do not smoke. Smoking increases your risk for heart attack and stroke. If you need help quitting, talk to your doctor about stop-smoking programs and medicines. These can increase your chances of quitting for good. When should you call for help? Call 911 anytime you think you may need emergency care. This may mean having symptoms that suggest that your blood pressure is causing a serious heart or blood vessel problem. Your blood pressure may be over 180/120.   For example, call 911 if:    · You have symptoms of a heart attack. These may include:  ? Chest pain or pressure, or a strange feeling in the chest.  ? Sweating. ? Shortness of breath. ? Nausea or vomiting. ? Pain, pressure, or a strange feeling in the back, neck, jaw, or upper belly or in one or both shoulders or arms. ? Lightheadedness or sudden weakness.   ? A fast or irregular heartbeat.     · You have symptoms of a stroke. These may include:  ? Sudden numbness, tingling, weakness, or loss of movement in your face, arm, or leg, especially on only one side of your body. ? Sudden vision changes. ? Sudden trouble speaking. ? Sudden confusion or trouble understanding simple statements. ? Sudden problems with walking or balance. ? A sudden, severe headache that is different from past headaches.     · You have severe back or belly pain.    Do not wait until your blood pressure comes down on its own. Get help right away.   Call your doctor now or seek immediate care if:    · Your blood pressure is much higher than normal (such as 180/120 or higher), but you don't have symptoms.     · You think high blood pressure is causing symptoms, such as:  ? Severe headache.  ? Blurry vision.    Watch closely for changes in your health, and be sure to contact your doctor if:    · Your blood pressure measures higher than your doctor recommends at least 2 times. That means the top number is higher or the bottom number is higher, or both.     · You think you may be having side effects from your blood pressure medicine. Where can you learn more? Go to http://malia-lincoln.info/. Enter U060 in the search box to learn more about \"High Blood Pressure: Care Instructions. \"  Current as of: July 22, 2018  Content Version: 12.1  © 8216-5507 [employer]. Care instructions adapted under license by [employer] (which disclaims liability or warranty for this information). If you have questions about a medical condition or this instruction, always ask your healthcare professional. [place] [address] any warranty or liability for your use of this information.

## 2019-07-24 NOTE — PROGRESS NOTES
Lab results okay except for blood sugar and glycohemoglobin level elevated so we will see if that improves with the changes made at the visit. Please respond the note is received.

## 2019-07-24 NOTE — PROGRESS NOTES
Chief Complaint   Patient presents with    Diabetes    Cholesterol Problem     3 month f/up    Hypertension    Chronic Kidney Disease    Obesity     1. Have you been to the ER, urgent care clinic since your last visit? Hospitalized since your last visit? No    2. Have you seen or consulted any other health care providers outside of the [address] [place] [address] since your last visit? Include any pap smears or colon screening. Yes, Went to [facility] Due back today for f/up lab work. Also saw a neurologist.  ? Name. Saw because of balance problem.

## 2019-07-25 ENCOUNTER — OFFICE VISIT (OUTPATIENT)
Dept: NEUROLOGY | Age: 74
End: 2019-07-25

## 2019-07-25 VITALS
HEIGHT: 64 IN | WEIGHT: 188 LBS | SYSTOLIC BLOOD PRESSURE: 124 MMHG | BODY MASS INDEX: 32.1 KG/M2 | DIASTOLIC BLOOD PRESSURE: 82 MMHG | OXYGEN SATURATION: 98 % | HEART RATE: 107 BPM

## 2019-07-25 DIAGNOSIS — M47.816 LUMBAR SPONDYLOSIS: ICD-10-CM

## 2019-07-25 DIAGNOSIS — M47.12 CERVICAL SPONDYLOSIS WITH MYELOPATHY: Primary | ICD-10-CM

## 2019-07-25 RX ORDER — PANTOPRAZOLE SODIUM 20 MG/1
20 TABLET, DELAYED RELEASE ORAL DAILY
COMMUNITY
End: 2019-11-25 | Stop reason: SDUPTHER

## 2019-07-25 RX ORDER — BACLOFEN 10 MG/1
10 TABLET ORAL 3 TIMES DAILY
Qty: 90 TAB | Refills: 2 | Status: SHIPPED | OUTPATIENT
Start: 2019-07-25 | End: 2019-09-25 | Stop reason: SDUPTHER

## 2019-07-25 NOTE — PATIENT INSTRUCTIONS
Cervical Myelopathy: Care Instructions  Your Care Instructions    Cervical myelopathy is a problem caused by pressure on your spinal cord. This pressure may come from a bone spur. Or it may come from a herniated disc. In some cases, aging makes the discs and ligaments around the spine thicker. This can also put pressure on the spinal cord. Cervical myelopathy can cause different symptoms. It may cause numbness and pain. It may also cause weakness in the arms, hands, and legs. For some people, it's hard to walk. Others have neck pain or problems with urination and bowel movements. Treatment depends on the cause and your symptoms. You may need regular checkups or you may need surgery to help take pressure off the nerve. Follow-up care is a key part of your treatment and safety. Be sure to make and go to all appointments, and call your doctor if you are having problems. It's also a good idea to know your test results and keep a list of the medicines you take. How can you care for yourself at home? · Be safe with medicines. Read and follow all instructions on the label. ? If the doctor gave you a prescription medicine for pain, take it as prescribed. ? If you are not taking a prescription pain medicine, ask your doctor if you can take an over-the-counter medicine. · Do exercises recommended by your doctor or physical therapist. Strong muscles can help you do everyday tasks. · If you have trouble using your hands, put Velcro or snaps on clothes. This can make your clothes easier to get on and off. · If you have trouble walking, you can make life easier and safer. Use rails and nonskid tape at home. Try a cane or walker to get around. · Do not smoke. Smoking can slow bone healing. If you need help quitting, talk to your doctor about stop-smoking programs and medicines. These can increase your chances of quitting for good. · Pace yourself. Everyday activities may take longer than before.  Give yourself more time to get things done. This may lower your stress. When should you call for help? Call 911 anytime you think you may need emergency care. For example, call if:    · You are unable to move an arm or a leg at all.   [facility] your doctor now or seek immediate medical care if:    · You have new or worse symptoms in your arms, legs, belly, or buttocks. Symptoms may include:  ? Numbness or tingling. ? Weakness. ? Pain.     · You lose bladder or bowel control.    Watch closely for changes in your health, and be sure to contact your doctor if:    · You do not get better as expected. Where can you learn more? Go to http://maliaAbacastlincoln.info/. Enter G181 in the search box to learn more about \"Cervical Myelopathy: Care Instructions. \"  Current as of: September 20, 2018  Content Version: 12.1  © 6875-9010 [employer]. Care instructions adapted under license by [employer] (which disclaims liability or warranty for this information). If you have questions about a medical condition or this instruction, always ask your healthcare professional. [place] [address] any warranty or liability for your use of this information. Low Back Arthritis: Exercises  Introduction  Here are some examples of typical rehabilitation exercises for your condition. Start each exercise slowly. Ease off the exercise if you start to have pain. Your doctor or physical therapist will tell you when you can start these exercises and which ones will work best for you. When you are not being active, find a comfortable position for rest. Some people are comfortable on the floor or a medium-firm bed with a small pillow under their head and another under their knees. Some people prefer to lie on their side with a pillow between their knees. Don't stay in one position for too long. Take short walks (10 to 20 minutes) every 2 to 3 hours. Avoid slopes, hills, and stairs until you feel better. Walk only distances you can manage without pain, especially leg pain. How to do the exercises  Pelvic tilt    1. Lie on your back with your knees bent. 2. \"Brace\" your stomachtighten your muscles by pulling in and imagining your belly button moving toward your spine. 3. Press your lower back into the floor. You should feel your hips and pelvis rock back. 4. Hold for 6 seconds while breathing smoothly. 5. Relax and allow your pelvis and hips to rock forward. 6. Repeat 8 to 12 times. Back stretches    1. Get down on your hands and knees on the floor. 2. Relax your head and allow it to droop. Round your back up toward the ceiling until you feel a nice stretch in your upper, middle, and lower back. Hold this stretch for as long as it feels comfortable, or about 15 to 30 seconds. 3. Return to the starting position with a flat back while you are on your hands and knees. 4. Let your back sway by pressing your stomach toward the floor. Lift your buttocks toward the ceiling. 5. Hold this position for 15 to 30 seconds. 6. Repeat 2 to 4 times. Follow-up care is a key part of your treatment and safety. Be sure to make and go to all appointments, and call your doctor if you are having problems. It's also a good idea to know your test results and keep a list of the medicines you take. Where can you learn more? Go to http://malia-lincoln.info/. Enter X805 in the search box to learn more about \"Low Back Arthritis: Exercises. \"  Current as of: September 20, 2018  Content Version: 12.1  © 9830-1738 [employer]. Care instructions adapted under license by [employer] (which disclaims liability or warranty for this information). If you have questions about a medical condition or this instruction, always ask your healthcare professional. [address] any warranty or liability for your use of this information.

## 2019-07-25 NOTE — PROGRESS NOTES
NEUROLOGY CLINIC NOTE    Patient ID:  Meera Stanford  [id number]  37 y.o.  1945    Date of Visit:  July 25, 2019    Referring Physician: Dr. Aubrie Nixon    Reason for Visit:  Gait instability    Chief Complaint   Patient presents with    Follow-up       History of Present Illness:     Patient Active Problem List    Diagnosis Date Noted    Acute bronchitis 04/30/2019    Interstitial lung disease ([address] 04/09/2019    Cervical stenosis of spine 03/26/2019    Spinal stenosis, cervical region 03/04/2019    Dyspnea on exertion 02/19/2019    Medicare annual wellness visit, subsequent 01/13/2019    Spinal stenosis, lumbar 07/16/2018    Pre-operative cardiovascular examination 07/11/2018    Lumbar disc disease 07/11/2018    Sleep disturbance 04/09/2018    Primary osteoarthritis involving multiple joints 01/12/2018    Recurrent depression ([address] 01/05/2018    Annual physical exam 10/02/2017    Vitamin D deficiency 10/02/2017    GI bleed 08/24/2017    Overactive bladder 08/24/2017    Polymyalgia rheumatica ([address] 08/24/2017    IBS (irritable bowel syndrome) 08/24/2017    Hypertension with renal disease 08/24/2017    CKD (chronic kidney disease), stage II 08/24/2017    Avascular necrosis of hip ([address] 08/24/2017    Abnormal LFTs 08/24/2017    Controlled type 2 diabetes mellitus with stage 2 chronic kidney disease, with long-term current use of insulin ([address] 04/20/2015    Non-seasonal allergic rhinitis 04/20/2015    Class 1 obesity due to excess calories without serious comorbidity with body mass index (BMI) of 33.0 to 33.9 in adult 04/20/2015    Rosacea 04/20/2015    Mixed hyperlipidemia 04/20/2015    Anxiety 04/20/2015     Past Medical History:   Diagnosis Date    Abnormal LFTs 08/24/2017    FATTY LIVER    Arthritis     OSTEO    Avascular necrosis of hip ([address] 08/24/2017    BILAT HIPS    Breast CA ([address] 1989, 2001    BILATERAL; SURGERY, CHEMO    Chronic pain     CKD (chronic kidney disease), stage II 8/24/2017    Coagulation disorder ([facility] 1984    ITP  (DR CHU BRADSHAW) - PLATELETS DROPPED TO 5K    Depression     Diabetes ([facility]     TYPE 2; NIDDM    DJD (degenerative joint disease), multiple sites 8/24/2017    GI bleed 2008    HEMORRHOIDS    Hyperlipemia     Hypertension with renal disease 8/24/2017    IBS (irritable bowel syndrome) 8/24/2017    Ill-defined condition 2015    PNEUMONIA X2 - HOSPITALIZED IN 2015    Interstitial lung disease ([facility] 04/01/2019    Liver disease     FATTY LIVER    Myalgia 8/24/2017    On statin therapy 8/24/2017    Overactive bladder 8/24/2017    Pneumonia 04/2015    HOSPITALIZED 3 WEEKS.  Polymyalgia rheumatica ([facility] 8/24/2017    Prophylactic antibiotic 8/24/2017    Reflex abnormality     acid reflex    Rosacea       Past Surgical History:   Procedure Laterality Date    BREAST SURGERY PROCEDURE UNLISTED  1993, 2002    RECONSTRUCTION X2    HX APPENDECTOMY  1950    HX BREAST BIOPSY Bilateral     HX CATARACT REMOVAL Bilateral     HX COLONOSCOPY      HX ENDOSCOPY      HX GI      COLONOSCOPY    HX HEENT      WISDOM TEETH    HX HYSTERECTOMY  2000S    HX MASTECTOMY Right 1989    HX MASTECTOMY Left 2001    HX ORTHOPAEDIC  2008    LUMBAR FUSION    HX ORTHOPAEDIC  2018    RE-DO LUMBAR FUSION, AND ADDED THORACIC FUSION    HX ORTHOPAEDIC  03/26/2019    neckectomy    HX TUBAL LIGATION  1981    HX UROLOGICAL  2000s    BLADDER SLING    TOTAL HIP ARTHROPLASTY Left 11/16/2016    ANTERIOR APPROACH, DR Gwendolyn De La Torre; (POSTOP: STANDS ONE INCH TALLER ON LEFT FOOT)    TOTAL HIP ARTHROPLASTY Right 12/2016    ANTERIOR APPROACH (DR Gwendolyn De La Torre)      Prior to Admission medications    Medication Sig Start Date End Date Taking? Authorizing Provider   montelukast sodium (SINGULAIR PO) Take  by mouth. Yes Provider, Historical   pantoprazole (PROTONIX) 20 mg tablet Take 20 mg by mouth daily.    Yes Provider, Historical   glimepiride (AMARYL) 2 mg tablet Take 1 Tab by mouth every morning. 7/24/19  Yes Cristine Aguirre MD   baclofen (LIORESAL) 10 mg tablet Take 1 tab at bedtime x 1 wk; then 1 tab BID x 1 wk; then 1 tab TID  Patient taking differently: 10 mg two (2) times a day. Take 1 tab at bedtime x 1 wk; then 1 tab BID x 1 wk; then 1 tab TID 6/20/19  Yes Bryan Wolf MD   pravastatin (PRAVACHOL) 80 mg tablet Take 1 Tab by mouth nightly. 6/7/19  Yes Cristine Aguirre MD   oxazepam (SERAX) 15 mg capsule TAKE 2 CAPSULES AT BEDTIME 6/7/19  Yes Cristine Aguirre MD   benzonatate (TESSALON) 200 mg capsule TAKE 1 CAPSULE THREE TIMES DAILY IF NEEDED FOR COUGH 6/7/19  Yes Cristine Aguirre MD   albuterol [facility] HFA) 90 mcg/actuation inhaler Take 1 Puff by inhalation every four (4) hours as needed for Wheezing or Shortness of Breath. 5/3/19  Yes Cristine Aguirre MD   cholecalciferol (VITAMIN D3) 1,000 unit cap Take  by mouth daily. Indications: unknown of the mg. Yes Provider, Historical   biotin-silicon diox-L-cysteine 3,000 mcg -100 mg-50 mg TbER Take 1 Tab by mouth daily. Yes Provider, Historical   glucose blood VI test strips (ACCU-CHEK CHERYL) strip Use once daily 2/11/19  Yes Cristine Aguirre MD   hydroCHLOROthiazide (HYDRODIURIL) 25 mg tablet TAKE 1 TABLET EVERY DAY 1/18/19  Yes Cristine Aguirre MD   fenofibrate nanocrystallized (TRICOR) 145 mg tablet Take 1 Tab by mouth daily. 1/15/19  Yes Cristine Aguirre MD   sertraline (ZOLOFT) 100 mg tablet TAKE 1 TABLET BY MOUTH DAILY  Patient taking differently: Take 100 mg by mouth daily. TAKE 1 TABLET BY MOUTH DAILY 7/6/18  Yes Cristine Aguirre MD   azelaic acid (FINACEA) 15 % topical gel Apply  to affected area two (2) times a day. Yes Provider, Historical   clindamycin (CLINDAGEL) 1 % topical gel Apply  to affected area two (2) times a day.  use thin film on affected area   Yes Provider, Historical   metFORMIN ER (GLUCOPHAGE XR) 500 mg tablet Take one tablet in the morning with breakfast and one tablet in the evening with dinner. 7/24/19   Beatriz Hughes MD   multivitamin, tx-iron-ca-min (THERA-M W/ IRON) 9 mg iron-400 mcg tab tablet Take 1 Tab by mouth daily. Provider, Historical   multivitamin (ONE A DAY) tablet Take 1 Tab by mouth daily. Provider, Historical     Allergies   Allergen Reactions    Statins-Hmg-Coa Reductase Inhibitors Myalgia     Myalgia with  multiple statins  Takes pravachol     Codeine Rash     Rash on thighs    Darvon [Propoxyphene] Other (comments)     \"I see worms\"    Pcn [Penicillins] Rash    Sulfa (Sulfonamide Antibiotics) Rash      Social History     Tobacco Use    Smoking status: Never Smoker    Smokeless tobacco: Never Used   Substance Use Topics    Alcohol use: No      Family History   Problem Relation Age of Onset    Heart Disease Mother     Kidney Disease Mother     Lung Disease Mother         COPD    Diabetes Brother     Pacemaker Brother     Kidney Disease Brother     Hypertension Brother     Anesth Problems Neg Hx         Subjective:      Syed Bullock is a 69 y.o. NGUC with history of cervical spinal stenosis status post surgery, chronic kidney disease, coagulation disorder, depression, diabetes, hyperlipidemia vascular necrosis bilateral hips, polymyalgia rheumatica, hypertension, interstitial lung disease and IBS who was referred here by Dr. Zaida Rivas for further evaluation of her gait issues. Per patient condition started around December 2018. Patient started having problems with walking. Feels wobbly and stiff. Cervical MRI without contrast done 2/21/2019 revealed straightening of the cervical spine. Grade 1 anterolisthesis of C7 on T1 measuring 3 mm. Grade 1 anterolisthesis of T1 on T2 measuring 3 mm. Diffusely heterogeneous marrow signal without a suspicious focal osseous lesion.   C1-C2, there is degenerative changes at the atlantodental joint, with a small cyst at the posterior aspect of the left dens measuring 6 x 3 mm, likely representing a synovial cyst. C2-C3, severe left and mild right facet arthropathy. C3-C4, diffuse disc osteophyte complex with bilateral uncovertebral spurring, right worse than left. Severe bilateral facet arthropathy. Severe right and mild left neural foraminal stenosis. C4-C5, diffuse disc osteophyte complex with bilateral uncovertebral spurring. Severe bilateral facet arthropathy. Mild bilateral neural foraminal stenosis. C5-C6, diffuse disc osteophyte complex with bilateral uncovertebral spurring, right worse than left. Superimposed small central disc extrusion with inferior migration. Severe bilateral facet arthropathy. Moderate spinal canal stenosis with indentation of the ventral cord. Severe bilateral neural foraminal stenosis. C6-C7, diffuse disc osteophyte complex with bilateral uncovertebral spurring. Severe bilateral facet arthropathy. Mild spinal canal stenosis. Severe bilateral neural foraminal stenosis. C7-T1, grade 1 anterolisthesis with uncovering of the disc. Severe bilateral facet arthropathy. Mild spinal canal stenosis. Severe bilateral neural foraminal stenosis. At T2-T3, there is a diffuse disc bulge which results in spinal canal stenosis with indentation of the ventral cord. Severe facet arthropathy is also noted at T2-T3. Patient was seen by orthopedics and underwent cervical surgery 3/26/2019. No benefit with her walking issues. Denies any weakness or loss of muscle bulk. Denies any falls. Better with a cane or walker. She is undergoing PT. She also mentions numbness and tingling left foot. She has had prior lumbar surgeries x 2 (last was 7/2018). Patient was seen by her orthopedic doctor last 5/16/2019. Note mentions patient is 7 weeks post operation. Note mentions it from a cervical spine standpoint she is doing well. No neck pain. Patient is not taking any medication for pain relief. She is working with home health PT and OT.   Note mentions some slight improvement was still having balance issues. Also breathing seems to be worse. Plan was to continue using soft collar and home PT and OT. Patient was also referred to neurology and rheumatology regarding her balance issues. Patient was last seen by her PCP 6/11/2019. Note mentions issues with diabetic control. It also mentions patient was recently on prednisone for interstitial lung disease. BMP done revealed elevated glucose at 183. Last hemoglobin A1c done 4/16/2019 was elevated at 7.4. Since the last visit on 6/20/2019, patient underwent EMG/NCS of bilateral lower extremity and it was normal.  No evidence of generalized large fiber neuropathy, myopathy or significant residual lumbar radiculopathy. Patient's issue is likely residual from the cervical myelopathy. Patient was started on baclofen for symptomatic relief. Patient reports she is taking 10 mg twice daily. Denies any side effects. She reports benefit with some improvement of her walking and reduction in stiffness. She still notes bilateral rib below the breast muscle spasms aggravated by increased physical activity. She is undergoing physical therapy and is having difficulty with balance exercises. No falls. Outside reports reviewed: EMG/NCS. Review of Systems:    A comprehensive review of systems was performed:   Positive for poor appetite, shortness of breath, increased urinary frequency, incontinence, weakness, balance issues    Objective:     Visit Vitals  /82   Pulse (!) 107   Ht 5' 4\" (1.626 m)   Wt 188 lb (85.3 kg)   SpO2 98%   BMI 32.27 kg/m²       2/10 back pain    PHYSICAL EXAM:    NEUROLOGICAL EXAM:    Appearance: The patient is obese, provides a coherent history and is in no acute distress. Mental Status: Oriented to time, place and person. Fluent, no aphasia or dysarthria. Mood and affect appropriate. Cranial Nerves:   II - XII were intact. Motor:  5/5 strength. Normal bulk and tone. No fasciculations.  No pronator drift. Reflexes:   Deep tendon reflexes 1+/4 and symmetrical. Downgoing toes. Sensory:   Normal to cold, pinprick and vibration except decrease vibration toes. Gait:  Less spastic and scissoring gait. No Romberg. Problems with tandem walking. Tremor:   No tremor noted. Cerebellar:  Intact FTN/PARMINDER/HTS. Assessment:   Cervical spondylosis and myelopathy  Lumbar spondylosis    Plan:   Neurological examination reveals less spastic and scissoring gait. Findings typically seen in upper motor neuron conditions spinal stenosis with myelopathy. Cervical MRI prior to surgery was reviewed. Reveals significant multilevel spondylosis with neuroforaminal narrowings and spinal stenosis worse at C5-6 and T2-3. Status post surgery. Likely dealing with residual changes from the myelopathy especially given normal LE EMG/NCS. Advised to increase Baclofen to 10 mg 3 times daily. Prescriptions provided. Further titration will depend upon response and tolerability. Patient also with history of lumbar spondylosis status post surgeries. This may be contributing to her gait issues as well. EMG/NCS of bilateral lower extremity was normal and did not reveal any significant residual lumbar radiculopathy. Continue home PT and OT. Advised to start aquatic exercises. All questions and concerns were answered. This note was created using voice recognition software. Despite editing, there may be syntax errors.

## 2019-07-25 NOTE — LETTER
7/25/19 Patient: Jacquelyn Goetz YOB: 1945 Date of Visit: 7/25/2019 MD [address] [id number] VIA In Basket Dear Jonathan Latham MD, Thank you for referring Ms. Antoine Pedro to [address] [place] [address] for evaluation. My notes for this consultation are attached. If you have questions, please do not hesitate to call me. I look forward to following your patient along with you. Sincerely, Ingrid Avendano MD

## 2019-08-09 ENCOUNTER — OFFICE VISIT (OUTPATIENT)
Dept: INTERNAL MEDICINE CLINIC | Age: 74
End: 2019-08-09

## 2019-08-09 VITALS
HEART RATE: 99 BPM | SYSTOLIC BLOOD PRESSURE: 130 MMHG | OXYGEN SATURATION: 97 % | HEIGHT: 64 IN | WEIGHT: 189.6 LBS | RESPIRATION RATE: 18 BRPM | BODY MASS INDEX: 32.37 KG/M2 | TEMPERATURE: 99.5 F | DIASTOLIC BLOOD PRESSURE: 86 MMHG

## 2019-08-09 DIAGNOSIS — J84.9 INTERSTITIAL LUNG DISEASE (HCC): ICD-10-CM

## 2019-08-09 DIAGNOSIS — I12.9 HYPERTENSION WITH RENAL DISEASE: ICD-10-CM

## 2019-08-09 DIAGNOSIS — E11.22 CONTROLLED TYPE 2 DIABETES MELLITUS WITH STAGE 2 CHRONIC KIDNEY DISEASE, WITH LONG-TERM CURRENT USE OF INSULIN (HCC): Primary | ICD-10-CM

## 2019-08-09 DIAGNOSIS — Z79.4 CONTROLLED TYPE 2 DIABETES MELLITUS WITH STAGE 2 CHRONIC KIDNEY DISEASE, WITH LONG-TERM CURRENT USE OF INSULIN (HCC): Primary | ICD-10-CM

## 2019-08-09 DIAGNOSIS — N18.2 CONTROLLED TYPE 2 DIABETES MELLITUS WITH STAGE 2 CHRONIC KIDNEY DISEASE, WITH LONG-TERM CURRENT USE OF INSULIN (HCC): Primary | ICD-10-CM

## 2019-08-09 RX ORDER — GLIMEPIRIDE 4 MG/1
4 TABLET ORAL
Qty: 90 TAB | Status: SHIPPED | OUTPATIENT
Start: 2019-08-09 | End: 2020-03-18 | Stop reason: ALTCHOICE

## 2019-08-09 NOTE — PATIENT INSTRUCTIONS
Arthritis: Care Instructions Your Care Instructions Arthritis, also called osteoarthritis, is a breakdown of the cartilage that cushions your joints. When the cartilage wears down, your bones rub against each other. This causes pain and stiffness. Many people have some arthritis as they age. Arthritis most often affects the joints of the spine, hands, hips, knees, or feet. You can take simple measures to protect your joints, ease your pain, and help you stay active. Follow-up care is a key part of your treatment and safety. Be sure to make and go to all appointments, and call your doctor if you are having problems. It's also a good idea to know your test results and keep a list of the medicines you take. How can you care for yourself at home? · Stay at a healthy weight. Being overweight puts extra strain on your joints. · Talk to your doctor or physical therapist about exercises that will help ease joint pain. ? Stretch. You may enjoy gentle forms of yoga to help keep your joints and muscles flexible. ? Walk instead of jog. Other types of exercise that are less stressful on the joints include riding a bicycle, swimming, bebo chi, or water exercise. ? Lift weights. Strong muscles help reduce stress on your joints. Stronger thigh muscles, for example, take some of the stress off of the knees and hips. Learn the right way to lift weights so you do not make joint pain worse. · Take your medicines exactly as prescribed. Call your doctor if you think you are having a problem with your medicine. · Take pain medicines exactly as directed. ? If the doctor gave you a prescription medicine for pain, take it as prescribed. ? If you are not taking a prescription pain medicine, ask your doctor if you can take an over-the-counter medicine. · Use a cane, crutch, walker, or another device if you need help to get around. These can help rest your joints.  You also can use other things to make life easier, such as a higher toilet seat and padded handles on kitchen utensils. · Do not sit in low chairs, which can make it hard to get up. · Put heat or cold on your sore joints as needed. Use whichever helps you most. You also can take turns with hot and cold packs. ? Apply heat 2 or 3 times a day for 20 to 30 minutesusing a heating pad, hot shower, or hot packto relieve pain and stiffness. ? Put ice or a cold pack on your sore joint for 10 to 20 minutes at a time. Put a thin cloth between the ice and your skin. When should you call for help? Call your doctor now or seek immediate medical care if: 
  · You have sudden swelling, warmth, or pain in any joint.  
  · You have joint pain and a fever or rash.  
  · You have such bad pain that you cannot use a joint.  
 Watch closely for changes in your health, and be sure to contact your doctor if: 
  · You have mild joint symptoms that continue even with more than 6 weeks of care at home.  
  · You have stomach pain or other problems with your medicine. Where can you learn more? Go to http://malia-lincoln.info/. Enter E276 in the search box to learn more about \"Arthritis: Care Instructions. \" Current as of: April 1, 2019 Content Version: 12.1 © 2926-3717 [employer]. Care instructions adapted under license by [employer] (which disclaims liability or warranty for this information). If you have questions about a medical condition or this instruction, always ask your healthcare professional. [place] [address] any warranty or liability for your use of this information.

## 2019-08-09 NOTE — PROGRESS NOTES
Chief Complaint   Patient presents with    Diabetes     pt states blood sugar has been running high x 1 week       SUBJECTIVE:    David Trammell is a 69 y.o. female who returns in follow-up for evaluation of blood sugars running out of control. She recently had a problem with metformin causing intolerable diarrhea and that was discontinued replaced on Amaryl 2 mg a day. Since then her blood sugars have been running an average of 222-240. She notes no increased thirst increased urination. She denies any visual symptoms. She denies any other complaints. Current Outpatient Medications   Medication Sig Dispense Refill    mirabegron ER (MYRBETRIQ) 50 mg ER tablet Take 50 mg by mouth daily.  SITagliptin (JANUVIA) 100 mg tablet Take 1 Tab by mouth daily. 90 Tab prn    glimepiride (AMARYL) 4 mg tablet Take 1 Tab by mouth every morning. 90 Tab prn    montelukast sodium (SINGULAIR PO) Take  by mouth.  pantoprazole (PROTONIX) 20 mg tablet Take 20 mg by mouth daily.  baclofen (LIORESAL) 10 mg tablet Take 1 Tab by mouth three (3) times daily. 90 Tab 2    multivitamin, tx-iron-ca-min (THERA-M W/ IRON) 9 mg iron-400 mcg tab tablet Take 1 Tab by mouth daily.  pravastatin (PRAVACHOL) 80 mg tablet Take 1 Tab by mouth nightly. 90 Tab 3    oxazepam (SERAX) 15 mg capsule TAKE 2 CAPSULES AT BEDTIME 60 Cap 2    albuterol (PROAIR HFA) 90 mcg/actuation inhaler Take 1 Puff by inhalation every four (4) hours as needed for Wheezing or Shortness of Breath. 1 Inhaler prn    cholecalciferol (VITAMIN D3) 1,000 unit cap Take  by mouth daily. Indications: unknown of the mg.  biotin-silicon diox-L-cysteine 3,000 mcg -100 mg-50 mg TbER Take 1 Tab by mouth daily.       glucose blood VI test strips (ACCU-CHEK CHERYL) strip Use once daily 50 Strip prn    hydroCHLOROthiazide (HYDRODIURIL) 25 mg tablet TAKE 1 TABLET EVERY DAY 90 Tab 3    fenofibrate nanocrystallized (TRICOR) 145 mg tablet Take 1 Tab by mouth daily. 30 Tab 11    sertraline (ZOLOFT) 100 mg tablet TAKE 1 TABLET BY MOUTH DAILY (Patient taking differently: Take 100 mg by mouth daily. TAKE 1 TABLET BY MOUTH DAILY) 90 Tab 3    multivitamin (ONE A DAY) tablet Take 1 Tab by mouth daily.  azelaic acid (FINACEA) 15 % topical gel Apply  to affected area two (2) times a day.  clindamycin (CLINDAGEL) 1 % topical gel Apply  to affected area two (2) times a day. use thin film on affected area       Past Medical History:   Diagnosis Date    Abnormal LFTs 08/24/2017    FATTY LIVER    Arthritis     OSTEO    Avascular necrosis of hip ([facility] [address] 08/24/2017    BILAT HIPS    Breast CA ([facility] [address] 1989, 2001    BILATERAL; SURGERY, CHEMO    Chronic pain     CKD (chronic kidney disease), stage II 8/24/2017    Coagulation disorder ([facility] [address] 1984    ITP  (DR CHU BRADSHAW) - PLATELETS DROPPED TO 5K    Depression     Diabetes ([facility] [address]     TYPE 2; NIDDM    DJD (degenerative joint disease), multiple sites 8/24/2017    GI bleed 2008    HEMORRHOIDS    Hyperlipemia     Hypertension with renal disease 8/24/2017    IBS (irritable bowel syndrome) 8/24/2017    Ill-defined condition 2015    PNEUMONIA X2 - HOSPITALIZED IN 2015    Interstitial lung disease ([facility] [address] 04/01/2019    Liver disease     FATTY LIVER    Myalgia 8/24/2017    On statin therapy 8/24/2017    Overactive bladder 8/24/2017    Pneumonia 04/2015    HOSPITALIZED 3 WEEKS.     Polymyalgia rheumatica ([facility] [address] 8/24/2017    Prophylactic antibiotic 8/24/2017    Reflex abnormality     acid reflex    Rosacea      Past Surgical History:   Procedure Laterality Date    BREAST SURGERY PROCEDURE UNLISTED  1993, 2002    RECONSTRUCTION X2    HX APPENDECTOMY  1950    HX BREAST BIOPSY Bilateral     HX CATARACT REMOVAL Bilateral     HX COLONOSCOPY      HX ENDOSCOPY      HX GI      COLONOSCOPY    HX HEENT      WISDOM TEETH    HX HYSTERECTOMY  2000S    HX MASTECTOMY Right 1989    HX MASTECTOMY Left 2001    HX ORTHOPAEDIC  2008 LUMBAR FUSION    HX ORTHOPAEDIC  2018    RE-DO LUMBAR FUSION, AND ADDED THORACIC FUSION    HX ORTHOPAEDIC  03/26/2019    neckectomy    HX TUBAL LIGATION  1981    HX UROLOGICAL  2000s    BLADDER SLING    TOTAL HIP ARTHROPLASTY Left 11/16/2016    ANTERIOR APPROACH, DR Gwendolyn De La Torre; (POSTOP: STANDS ONE INCH TALLER ON LEFT FOOT)    TOTAL HIP ARTHROPLASTY Right 12/2016    ANTERIOR APPROACH (DR JAIME)     Allergies   Allergen Reactions    Metformin Diarrhea    Statins-Hmg-Coa Reductase Inhibitors Myalgia     Myalgia with  multiple statins  Takes pravachol     Codeine Rash     Rash on thighs    Darvon [Propoxyphene] Other (comments)     \"I see worms\"    Pcn [Penicillins] Rash    Sulfa (Sulfonamide Antibiotics) Rash       REVIEW OF SYSTEMS:  General: negative for - chills or fever, or weight loss or gain  ENT: negative for - headaches, nasal congestion or tinnitus  Eyes: no blurred or visual changes  Neck: No stiffness or swollen nodes  Respiratory: negative for - cough, hemoptysis, shortness of breath or wheezing  Cardiovascular : negative for - chest pain, edema, palpitations or shortness of breath  Gastrointestinal: negative for - abdominal pain, blood in stools, heartburn or nausea/vomiting  Genito-Urinary: no dysuria, trouble voiding, or hematuria  Musculoskeletal: negative for - gait disturbance, joint pain, joint stiffness or joint swelling  Neurological: no TIA or stroke symptoms  Hematologic: no bruises, no bleeding  Lymphatic: no swollen glands  Integument: no lumps, mole changes, nail changes or rash  Endocrine:no malaise/lethargy poly uria or polydipsia or unexpected weight changes        Social History     Socioeconomic History    Marital status:      Spouse name: Not on file    Number of children: Not on file    Years of education: Not on file    Highest education level: Not on file   Tobacco Use    Smoking status: Never Smoker    Smokeless tobacco: Never Used   Substance and Sexual Activity    Alcohol use: No    Drug use: No    Sexual activity: Never     Family History   Problem Relation Age of Onset    Heart Disease Mother     Kidney Disease Mother     Lung Disease Mother         COPD    Diabetes Brother     Pacemaker Brother     Kidney Disease Brother     Hypertension Brother     Anesth Problems Neg Hx        OBJECTIVE:     Visit Vitals  /86 (BP 1 Location: Left arm, BP Patient Position: Sitting)   Pulse 99   Temp 99.5 °F (37.5 °C) (Oral)   Resp 18   Ht 5' 4\" (1.626 m)   Wt 189 lb 9.6 oz (86 kg)   SpO2 97%   BMI 32.54 kg/m²     CONSTITUTIONAL:   well nourished, appears age appropriate  EYES: sclera anicteric, PERRL, EOMI  ENMT:nares clear, moist mucous membranes, pharynx clear  NECK: supple. Thyroid normal, No JVD or bruits  RESPIRATORY: Chest: clear to ascultation and percussion, normal inspiratory effort  CARDIOVASCULAR: Heart: regular rate and rhythm no murmurs, rubs or gallops, PMI not displaced, No thrills  GASTROINTESTINAL: Abdomen: non distended, soft, non tender, bowel sounds normal  HEMATOLOGIC: no purpura, petechiae or bruising  LYMPHATIC: No lymph node enlargemant  MUSCULOSKELETAL: Extremities: no edema or active synovitis, pulse 1+   INTEGUMENT: No unusual rashes or suspicious skin lesions noted. Nails appear normal.  PERIPHERAL VASCULAR: normal pulses femoral, PT and DP  NEUROLOGIC: non-focal exam, A & O X 3  PSYCHIATRIC:, appropriate affect     ASSESSMENT:   1. Controlled type 2 diabetes mellitus with stage 2 chronic kidney disease, with long-term current use of insulin ([address]    2. Hypertension with renal disease    3. Interstitial lung disease (HCC)      Impression  1. Uncontrolled diabetes at this point I am increasing her Amaryl to 4 mg daily. I am also adding Januvia 100 mg daily. 2.  Hypertension that is controlled  3. Interstitial lung disease I do not think is playing a current role in what is going on.   She had questions regarding diabetes and that and I told her that is only an issue if you use inhaled insulin which can cause interstitial lung disease  I will recheck her again myself in October at her regular scheduled appointment or sooner if there is a problem. PLAN:  .  Orders Placed This Encounter    mirabegron ER (MYRBETRIQ) 50 mg ER tablet    SITagliptin (JANUVIA) 100 mg tablet    glimepiride (AMARYL) 4 mg tablet         ATTENTION:   This medical record was transcribed using an electronic medical records system. Although proofread, it may and can contain electronic and spelling errors. Other human spelling and other errors may be present. Corrections may be executed at a later time. Please feel free to contact us for any clarifications as needed. No results found for any visits on 08/09/19. Taryn Chavez MD    The patient verbalized understanding of the problems and plans as explained.

## 2019-08-09 NOTE — PROGRESS NOTES
Chief Complaint   Patient presents with    Diabetes     pt states blood sugar has been running high x 1 week     Visit Vitals  /86 (BP 1 Location: Left arm, BP Patient Position: Sitting)   Pulse 99   Temp 99.5 °F (37.5 °C) (Oral)   Resp 18   Ht 5' 4\" (1.626 m)   Wt 189 lb 9.6 oz (86 kg)   SpO2 97%   BMI 32.54 kg/m²     1. Have you been to the ER, urgent care clinic since your last visit? Hospitalized since your last visit? No    2. Have you seen or consulted any other health care providers outside of . the [address] [place] [address] since your last visit? Include any pap smears or colon screening.  Dr. Jennifer Machado @ [facility]

## 2019-09-04 ENCOUNTER — TELEPHONE (OUTPATIENT)
Dept: NEUROLOGY | Age: 74
End: 2019-09-04

## 2019-09-04 NOTE — TELEPHONE ENCOUNTER
Patient called to [facility] on the status of the long term disability forms that was mailed to the office.  The forms are from the [address] [place] [address]

## 2019-09-05 DIAGNOSIS — R26.81 GAIT INSTABILITY: Primary | ICD-10-CM

## 2019-09-05 NOTE — TELEPHONE ENCOUNTER
Spoke with patient and she stated that she went to prthopedics yesterday and they stated they fixed the surgical issue with her spine and they think that it is neurological.     Surgeons said she may need a head CT or Mri to see if it is something neurological.    Please advise

## 2019-09-05 NOTE — TELEPHONE ENCOUNTER
Please call the patient and tell her we can certainly obtain a head CT to assess if it is contributing to her walking issues and if that looks good, then it is all a residual from her spine issues. I can order the head CT if she wants to proceed with that.

## 2019-09-10 ENCOUNTER — OFFICE VISIT (OUTPATIENT)
Dept: INTERNAL MEDICINE CLINIC | Age: 74
End: 2019-09-10

## 2019-09-10 VITALS
RESPIRATION RATE: 16 BRPM | WEIGHT: 199.8 LBS | BODY MASS INDEX: 34.11 KG/M2 | HEART RATE: 83 BPM | TEMPERATURE: 98.2 F | HEIGHT: 64 IN | SYSTOLIC BLOOD PRESSURE: 149 MMHG | DIASTOLIC BLOOD PRESSURE: 89 MMHG | OXYGEN SATURATION: 98 %

## 2019-09-10 DIAGNOSIS — R60.9 EDEMA, UNSPECIFIED TYPE: ICD-10-CM

## 2019-09-10 DIAGNOSIS — H61.21 IMPACTED CERUMEN OF RIGHT EAR: ICD-10-CM

## 2019-09-10 DIAGNOSIS — N18.2 CKD (CHRONIC KIDNEY DISEASE), STAGE II: ICD-10-CM

## 2019-09-10 DIAGNOSIS — H66.002 NON-RECURRENT ACUTE SUPPURATIVE OTITIS MEDIA OF LEFT EAR WITHOUT SPONTANEOUS RUPTURE OF TYMPANIC MEMBRANE: Primary | ICD-10-CM

## 2019-09-10 DIAGNOSIS — I12.9 HYPERTENSION WITH RENAL DISEASE: ICD-10-CM

## 2019-09-10 LAB
ANION GAP SERPL CALC-SCNC: 10 MMOL/L
BUN SERPL-MCNC: 22 MG/DL (ref 7–17)
CALCIUM SERPL-MCNC: 10 MG/DL (ref 8.4–10.2)
CHLORIDE SERPL-SCNC: 102 MMOL/L (ref 98–107)
CO2 SERPL-SCNC: 33 MMOL/L (ref 22–32)
CREAT SERPL-MCNC: 0.9 MG/DL (ref 0.7–1.2)
GLUCOSE SERPL-MCNC: 199 MG/DL (ref 65–105)
POTASSIUM SERPL-SCNC: 4.8 MMOL/L (ref 3.6–5)
SODIUM SERPL-SCNC: 145 MMOL/L (ref 137–145)

## 2019-09-10 RX ORDER — CEFUROXIME AXETIL 250 MG/1
250 TABLET ORAL 2 TIMES DAILY
Qty: 20 TAB | Refills: 0 | Status: SHIPPED | OUTPATIENT
Start: 2019-09-10 | End: 2019-11-25 | Stop reason: SDUPTHER

## 2019-09-10 RX ORDER — FUROSEMIDE 40 MG/1
40 TABLET ORAL DAILY
Qty: 30 TAB | Status: SHIPPED | OUTPATIENT
Start: 2019-09-10 | End: 2020-03-18 | Stop reason: ALTCHOICE

## 2019-09-10 NOTE — PATIENT INSTRUCTIONS
Body Mass Index: Care Instructions  Your Care Instructions    Body mass index (BMI) can help you see if your weight is raising your risk for health problems. It uses a formula to compare how much you weigh with how tall you are. · A BMI lower than 18.5 is considered underweight. · A BMI between 18.5 and 24.9 is considered healthy. · A BMI between 25 and 29.9 is considered overweight. A BMI of 30 or higher is considered obese. If your BMI is in the normal range, it means that you have a lower risk for weight-related health problems. If your BMI is in the overweight or obese range, you may be at increased risk for weight-related health problems, such as high blood pressure, heart disease, stroke, arthritis or joint pain, and diabetes. If your BMI is in the underweight range, you may be at increased risk for health problems such as fatigue, lower protection (immunity) against illness, muscle loss, bone loss, hair loss, and hormone problems. BMI is just one measure of your risk for weight-related health problems. You may be at higher risk for health problems if you are not active, you eat an unhealthy diet, or you drink too much alcohol or use tobacco products. Follow-up care is a key part of your treatment and safety. Be sure to make and go to all appointments, and call your doctor if you are having problems. It's also a good idea to know your test results and keep a list of the medicines you take. How can you care for yourself at home? · Practice healthy eating habits. This includes eating plenty of fruits, vegetables, whole grains, lean protein, and low-fat dairy. · If your doctor recommends it, get more exercise. Walking is a good choice. Bit by bit, increase the amount you walk every day. Try for at least 30 minutes on most days of the week. · Do not smoke. Smoking can increase your risk for health problems. If you need help quitting, talk to your doctor about stop-smoking programs and medicines. These can increase your chances of quitting for good. · Limit alcohol to 2 drinks a day for men and 1 drink a day for women. Too much alcohol can cause health problems. If you have a BMI higher than 25  · Your doctor may do other tests to check your risk for weight-related health problems. This may include measuring the distance around your waist. A waist measurement of more than 40 inches in men or 35 inches in women can increase the risk of weight-related health problems. · Talk with your doctor about steps you can take to stay healthy or improve your health. You may need to make lifestyle changes to lose weight and stay healthy, such as changing your diet and getting regular exercise. If you have a BMI lower than 18.5  · Your doctor may do other tests to check your risk for health problems. · Talk with your doctor about steps you can take to stay healthy or improve your health. You may need to make lifestyle changes to gain or maintain weight and stay healthy, such as getting more healthy foods in your diet and doing exercises to build muscle. Where can you learn more? Go to http://malia-lincoln.info/. Enter S176 in the search box to learn more about \"Body Mass Index: Care Instructions. \"  Current as of: March 28, 2019  Content Version: 12.1  © 0507-2449 [employer]. Care instructions adapted under license by [employer] (which disclaims liability or warranty for this information). If you have questions about a medical condition or this instruction, always ask your healthcare professional. [address] any warranty or liability for your use of this information.

## 2019-09-10 NOTE — PROGRESS NOTES
Subjective:   Meera Stanford is a 69 y.o. female      Chief Complaint   Patient presents with    Ear Pain     bilateral ear pain        History of present illness: She presents today with initial complaint of bilateral ear pain and she feels like her right ear is clogged. She also has another complaint that she has increased swelling in her legs and she is noting that her weight is gone up 10 pounds. She is curious as to what her kidneys could be failing. She does take hydrochlorothiazide on a regular basis. She denies any chest pain, shortness breath, palpitations, PND, orthopnea or other cardiorespiratory complaints. There are no other complaints.     Patient Active Problem List   Diagnosis Code    Controlled type 2 diabetes mellitus with stage 2 chronic kidney disease, with long-term current use of insulin ([facility] [address] E11.22, N18.2, Z79.4    Non-seasonal allergic rhinitis J30.89    Class 1 obesity due to excess calories without serious comorbidity with body mass index (BMI) of 33.0 to 33.9 in adult E66.09, Z68.33    Rosacea L71.9    Mixed hyperlipidemia E78.2    Anxiety F41.9    GI bleed K92.2    Overactive bladder N32.81    Polymyalgia rheumatica (HCC) M35.3    IBS (irritable bowel syndrome) K58.9    Hypertension with renal disease I12.9    CKD (chronic kidney disease), stage II N18.2    Avascular necrosis of hip (HCC) M87.059    Abnormal LFTs R94.5    Annual physical exam Z00.00    Vitamin D deficiency E55.9    Recurrent depression (HCC) F33.9    Primary osteoarthritis involving multiple joints M15.0    Sleep disturbance G47.9    Pre-operative cardiovascular examination Z01.810    Lumbar disc disease M51.9    Spinal stenosis, lumbar M48.061    Medicare annual wellness visit, subsequent Z00.00    Dyspnea on exertion R06.09    Spinal stenosis, cervical region M48.02    Cervical stenosis of spine M48.02    Interstitial lung disease (HCC) J84.9    Acute bronchitis J20.9    Non-recurrent acute suppurative otitis media of left ear without spontaneous rupture of tympanic membrane H66.002    Impacted cerumen of right ear H61.21      Past Medical History:   Diagnosis Date    Abnormal LFTs 08/24/2017    FATTY LIVER    Arthritis     OSTEO    Avascular necrosis of hip ([facility] [address] 08/24/2017    BILAT HIPS    Breast CA ([facility] [address] 1989, 2001    BILATERAL; SURGERY, CHEMO    Chronic pain     CKD (chronic kidney disease), stage II 8/24/2017    Coagulation disorder ([facility] [address] 1984    ITP  (DR CHU BRADSHAW) - PLATELETS DROPPED TO 5K    Depression     Diabetes ([facility] [address]     TYPE 2; NIDDM    DJD (degenerative joint disease), multiple sites 8/24/2017    GI bleed 2008    HEMORRHOIDS    Hyperlipemia     Hypertension with renal disease 8/24/2017    IBS (irritable bowel syndrome) 8/24/2017    Ill-defined condition 2015    PNEUMONIA X2 - HOSPITALIZED IN 2015    Interstitial lung disease ([facility] 04/01/2019    Liver disease     FATTY LIVER    Myalgia 8/24/2017    On statin therapy 8/24/2017    Overactive bladder 8/24/2017    Pneumonia 04/2015    HOSPITALIZED 3 WEEKS.     Polymyalgia rheumatica (HCC) 8/24/2017    Prophylactic antibiotic 8/24/2017    Reflex abnormality     acid reflex    Rosacea       Allergies   Allergen Reactions    Metformin Diarrhea    Statins-Hmg-Coa Reductase Inhibitors Myalgia     Myalgia with  multiple statins  Takes pravachol     Codeine Rash     Rash on thighs    Darvon [Propoxyphene] Other (comments)     \"I see worms\"    Pcn [Penicillins] Rash    Sulfa (Sulfonamide Antibiotics) Rash      Family History   Problem Relation Age of Onset    Heart Disease Mother     Kidney Disease Mother     Lung Disease Mother         COPD    Diabetes Brother     Pacemaker Brother     Kidney Disease Brother     Hypertension Brother     Anesth Problems Neg Hx       Social History     Socioeconomic History    Marital status:      Spouse name: Not on file    Number of children: Not on file    Years of education: Not on file    Highest education level: Not on file   Occupational History    Not on file   Social Needs    Financial resource strain: Not on file    Food insecurity:     Worry: Not on file     Inability: Not on file    Transportation needs:     Medical: Not on file     Non-medical: Not on file   Tobacco Use    Smoking status: Never Smoker    Smokeless tobacco: Never Used   Substance and Sexual Activity    Alcohol use: No    Drug use: No    Sexual activity: Never   Lifestyle    Physical activity:     Days per week: Not on file     Minutes per session: Not on file    Stress: Not on file   Relationships    Social connections:     Talks on phone: Not on file     Gets together: Not on file     Attends [employer] service: Not on file     Active member of club or organization: Not on file     Attends meetings of clubs or organizations: Not on file     Relationship status: Not on file    Intimate partner violence:     Fear of current or ex partner: Not on file     Emotionally abused: Not on file     Physically abused: Not on file     Forced sexual activity: Not on file   Other Topics Concern    Not on file   Social History Narrative    Not on file     Prior to Admission medications    Medication Sig Start Date End Date Taking? Authorizing Provider   cefUROXime (CEFTIN) 250 mg tablet Take 1 Tab by mouth two (2) times a day. 9/10/19  Yes Doris Galan MD   furosemide (LASIX) 40 mg tablet Take 1 Tab by mouth daily. 9/10/19  Yes Doris Galan MD   mirabegron ER [facility]) 50 mg ER tablet Take 50 mg by mouth daily. Yes Provider, Historical   SITagliptin (JANUVIA) 100 mg tablet Take 1 Tab by mouth daily. 8/9/19  Yes Doris Galan MD   glimepiride (AMARYL) 4 mg tablet Take 1 Tab by mouth every morning. 8/9/19  Yes Doris Galan MD   montelukast sodium (SINGULAIR PO) Take  by mouth. Yes Provider, Historical   pantoprazole (PROTONIX) 20 mg tablet Take 20 mg by mouth daily. Yes Provider, Historical   baclofen (LIORESAL) 10 mg tablet Take 1 Tab by mouth three (3) times daily. 7/25/19  Yes Tanna Archibald MD   multivitamin, tx-iron-ca-min (THERA-M W/ IRON) 9 mg iron-400 mcg tab tablet Take 1 Tab by mouth daily. Yes Provider, Historical   pravastatin (PRAVACHOL) 80 mg tablet Take 1 Tab by mouth nightly. 6/7/19  Yes Chuck Sevilla MD   oxazepam [facility]) 15 mg capsule TAKE 2 CAPSULES AT BEDTIME 6/7/19  Yes Chuck Sevilla MD   albuterol [facility] HFA) 90 mcg/actuation inhaler Take 1 Puff by inhalation every four (4) hours as needed for Wheezing or Shortness of Breath. 5/3/19  Yes Chuck Sevilla MD   cholecalciferol (VITAMIN D3) 1,000 unit cap Take  by mouth daily. Indications: unknown of the mg. Yes Provider, Historical   biotin-silicon diox-L-cysteine 3,000 mcg -100 mg-50 mg TbER Take 1 Tab by mouth daily. Yes Provider, Historical   glucose blood VI test strips (ACCU-CHEK CHERYL) strip Use once daily 2/11/19  Yes Chuck Sevilla MD   hydroCHLOROthiazide (HYDRODIURIL) 25 mg tablet TAKE 1 TABLET EVERY DAY 1/18/19  Yes Chuck Sevilla MD   fenofibrate nanocrystallized (TRICOR) 145 mg tablet Take 1 Tab by mouth daily. 1/15/19  Yes Chuck Sevilla MD   sertraline (ZOLOFT) 100 mg tablet TAKE 1 TABLET BY MOUTH DAILY  Patient taking differently: Take 100 mg by mouth daily. TAKE 1 TABLET BY MOUTH DAILY 7/6/18  Yes Chuck Sevilla MD   multivitamin (ONE A DAY) tablet Take 1 Tab by mouth daily. Yes Provider, Historical   azelaic acid (FINACEA) 15 % topical gel Apply  to affected area two (2) times a day. Yes Provider, Historical   clindamycin (CLINDAGEL) 1 % topical gel Apply  to affected area two (2) times a day. use thin film on affected area   Yes Provider, Historical        Review of Systems              Constitutional:  She denies fever, weight loss, sweats or fatigue.               EYES: No blurred or double vision,               ENT: no nasal congestion, no headache or dizziness. No difficulty with swallowing, taste, speech or smell. Positive for bilateral ear discomfort with pressure in the right ear  Respiratory:  No cough, wheezing or shortness of breath. No sputum production. Cardiac:  Denies chest pain, palpitations, unexplained indigestion, syncope, PND or orthopnea. Positive edema  GI:  No changes in bowel movements, no abdominal pain, no bloating, anorexia, nausea, vomiting or heartburn. :  No frequency or dysuria. Denies incontinence or sexual dysfunction. Extremities:  No joint pain, stiffness positive for swelling  Back:.no pain or soreness  Skin:  No recent rashes or mole changes. Neurological:  No numbness, tingling, burning paresthesias or loss of motor strength. No syncope, dizziness, frequent headaches or memory loss. Hematologic:  No easy bruising  Lymphatic: No lymph node enlargement    Objective:     Vitals:    09/10/19 1622   BP: 149/89   Pulse: 83   Resp: 16   Temp: 98.2 °F (36.8 °C)   TempSrc: Oral   SpO2: 98%   Weight: 199 lb 12.8 oz (90.6 kg)   Height: 5' 4\" (1.626 m)   PainSc:   5   PainLoc: Ear       Body mass index is 34.3 kg/m². Physical Examination:              General Appearance:  Well-developed, well-nourished, no acute distress. HEENT:      Ears: Left external canal clear tympanic membranes erythematous but nonbulging. Right external ear canal has cerumen impaction. Cerumen impaction is cleared and then that appears to be normal with normal canal and tympanic membrane  Eyes:  The pupillary responses were normal.  Extraocular muscle function intact. Lids and conjunctiva not injected. Funduscopic exam revealed sharp disc margins. Nares: Clear w/o edema or erythema  Pharynx:  Clear with teeth in good repair. No masses were noted. Neck:  Supple without thyromegaly or adenopathy. No JVD noted. No carotid                bruits. Lungs:  Clear to auscultation and percussion.   Cardiac: Regular rate and rhythm without murmur. PMI not displaced. No gallop, rub or click. 1+ edema bilateral lower extremities  Abdominal: Soft, non-tender, no hepata-spleenomegally or masses  Extremities:  No clubbing, cyanosis but 1+ bilateral lower extremity edema noted  Skin:  No rash or unusual mole changes noted. Lymph Nodes:  None felt in the cervical, supraclavicular, axillary or inguinal region. Neurological: . DTRs 2+ and symmetric. Station and gait normal.   Hematologic:   No purpura or petechiae        Assessment/Plan:         1. Non-recurrent acute suppurative otitis media of left ear without spontaneous rupture of tympanic membrane    2. Impacted cerumen of right ear    3. Edema, unspecified type    4. Hypertension with renal disease    5. CKD (chronic kidney disease), stage II        Impressions/Plan:  Impression  1. Left otitis media we will treat this with Ceftin twice a day for 10 days  2. Cerumen impaction right ear. Cerumenex is placed in the ear it is then irrigated but not completely cleared. Alligator forceps were then used and completely cleared. 3.  Edema bilateral lower extremities and review of the chart reveals that her weight is up 10 pounds at this point we will switch her hydrochlorothiazide to Lasix 40 mg daily. I am checking a BMP to evaluate her renal function. I will recheck her in about a week to 10 days. 4.  Hypertension that is not controlled we will re-evaluate that on follow-up. I will call with lab results. As noted follow-up a week to 10 days. 30 minutes spent in direct care of this patient today not including time spent on procedures. Orders Placed This Encounter    REMOVE IMPACTED EAR WAX    METABOLIC PANEL, BASIC ([facility])    cefUROXime (CEFTIN) 250 mg tablet    furosemide (LASIX) 40 mg tablet       Follow-up and Dispositions    · Return in about 3 weeks (around 10/1/2019). No results found for any visits on 09/10/19.       Uche Brennan Bucky Stockton MD    The patient was given after the visit summary the patient verbalized an understanding of the plans and problems as explained.

## 2019-09-10 NOTE — PROGRESS NOTES
Chief Complaint   Patient presents with    Ear Pain     bilateral ear pain       1. Have you been to the ER, urgent care clinic since your last visit? Hospitalized since your last visit? no    2. Have you seen or consulted any other health care providers outside of the [facility] since your last visit? Include any pap smears or colon screening.   no

## 2019-09-11 ENCOUNTER — TELEPHONE (OUTPATIENT)
Dept: NEUROLOGY | Age: 74
End: 2019-09-11

## 2019-09-11 NOTE — TELEPHONE ENCOUNTER
Received a phone call from Belle with coordination of care to let us know the CT that was ordered has been denied.  A peer to peer is needed    He stated to call Replaced by [facility] [phone]  Reference number#[id number]

## 2019-09-13 ENCOUNTER — HOSPITAL ENCOUNTER (OUTPATIENT)
Dept: CT IMAGING | Age: 74
Discharge: HOME | End: 2019-09-13
Attending: PSYCHIATRY & NEUROLOGY
Payer: COMMERCIAL

## 2019-09-13 DIAGNOSIS — R26.81 GAIT INSTABILITY: ICD-10-CM

## 2019-09-13 PROCEDURE — 70450 CT HEAD/BRAIN W/O DYE: CPT

## 2019-09-23 PROBLEM — Z00.00 MEDICARE ANNUAL WELLNESS VISIT, SUBSEQUENT: Status: RESOLVED | Noted: 2019-01-13 | Resolved: 2019-09-23

## 2019-09-24 PROBLEM — Z00.00 ANNUAL PHYSICAL EXAM: Status: RESOLVED | Noted: 2017-10-02 | Resolved: 2019-09-24

## 2019-09-24 PROBLEM — Z01.810 PRE-OPERATIVE CARDIOVASCULAR EXAMINATION: Status: RESOLVED | Noted: 2018-07-11 | Resolved: 2019-09-24

## 2019-09-25 ENCOUNTER — OFFICE VISIT (OUTPATIENT)
Dept: NEUROLOGY | Age: 74
End: 2019-09-25

## 2019-09-25 VITALS — HEIGHT: 64 IN | BODY MASS INDEX: 32.95 KG/M2 | OXYGEN SATURATION: 98 % | WEIGHT: 193 LBS | HEART RATE: 88 BPM

## 2019-09-25 DIAGNOSIS — M47.816 LUMBAR SPONDYLOSIS: Primary | ICD-10-CM

## 2019-09-25 DIAGNOSIS — M47.12 CERVICAL SPONDYLOSIS WITH MYELOPATHY: ICD-10-CM

## 2019-09-25 RX ORDER — BACLOFEN 10 MG/1
20 TABLET ORAL 3 TIMES DAILY
Qty: 180 TAB | Refills: 2 | Status: SHIPPED | OUTPATIENT
Start: 2019-09-25 | End: 2019-11-25 | Stop reason: SDUPTHER

## 2019-09-25 NOTE — LETTER
9/25/19 Patient: Stephanie Momin YOB: 1945 Date of Visit: 9/25/2019 Luis Buckley MD 
[facility] [phone] [id number] VIA In Basket Dear Luis Buckley MD, Thank you for referring Ms. Anselmo Medina to [address] [place] [address] for evaluation. My notes for this consultation are attached. If you have questions, please do not hesitate to call me. I look forward to following your patient along with you. Sincerely, Lewis Wylie MD

## 2019-09-25 NOTE — PROGRESS NOTES
NEUROLOGY CLINIC NOTE    Patient ID:  Kate Dominguez  [id number]  80 y.o.  1945    Date of Visit:  September 25, 2019    Referring Physician: Dr. Ash Casarez    Reason for Visit:  Gait instability    Chief Complaint   Patient presents with    Follow-up       History of Present Illness:     Patient Active Problem List    Diagnosis Date Noted    Non-recurrent acute suppurative otitis media of left ear without spontaneous rupture of tympanic membrane 09/10/2019    Impacted cerumen of right ear 09/10/2019    Acute bronchitis 04/30/2019    Interstitial lung disease ([address] 04/09/2019    Cervical stenosis of spine 03/26/2019    Spinal stenosis, cervical region 03/04/2019    Dyspnea on exertion 02/19/2019    Spinal stenosis, lumbar 07/16/2018    Lumbar disc disease 07/11/2018    Sleep disturbance 04/09/2018    Primary osteoarthritis involving multiple joints 01/12/2018    Recurrent depression ([address] 01/05/2018    Vitamin D deficiency 10/02/2017    GI bleed 08/24/2017    Overactive bladder 08/24/2017    Polymyalgia rheumatica ([address] 08/24/2017    IBS (irritable bowel syndrome) 08/24/2017    Hypertension with renal disease 08/24/2017    CKD (chronic kidney disease), stage II 08/24/2017    Avascular necrosis of hip ([address] 08/24/2017    Abnormal LFTs 08/24/2017    Controlled type 2 diabetes mellitus with stage 2 chronic kidney disease, with long-term current use of insulin ([address] 04/20/2015    Non-seasonal allergic rhinitis 04/20/2015    Class 1 obesity due to excess calories without serious comorbidity with body mass index (BMI) of 33.0 to 33.9 in adult 04/20/2015    Rosacea 04/20/2015    Mixed hyperlipidemia 04/20/2015    Anxiety 04/20/2015     Past Medical History:   Diagnosis Date    Abnormal LFTs 08/24/2017    FATTY LIVER    Arthritis     OSTEO    Avascular necrosis of hip ([address] 08/24/2017    BILAT HIPS    Breast CA ([address] 1989, 2001    BILATERAL; SURGERY, CHEMO    Chronic pain     CKD (chronic kidney disease), stage II 8/24/2017    Coagulation disorder ([facility] 1984    ITP  (DR CHU BRADSHAW) - PLATELETS DROPPED TO 5K    Depression     Diabetes ([facility]     TYPE 2; NIDDM    DJD (degenerative joint disease), multiple sites 8/24/2017    GI bleed 2008    HEMORRHOIDS    Hyperlipemia     Hypertension with renal disease 8/24/2017    IBS (irritable bowel syndrome) 8/24/2017    Ill-defined condition 2015    PNEUMONIA X2 - HOSPITALIZED IN 2015    Interstitial lung disease ([facility] 04/01/2019    Liver disease     FATTY LIVER    Myalgia 8/24/2017    On statin therapy 8/24/2017    Overactive bladder 8/24/2017    Pneumonia 04/2015    HOSPITALIZED 3 WEEKS.  Polymyalgia rheumatica ([facility] 8/24/2017    Prophylactic antibiotic 8/24/2017    Reflex abnormality     acid reflex    Rosacea       Past Surgical History:   Procedure Laterality Date    BREAST SURGERY PROCEDURE UNLISTED  1993, 2002    RECONSTRUCTION X2    HX APPENDECTOMY  1950    HX BREAST BIOPSY Bilateral     HX CATARACT REMOVAL Bilateral     HX COLONOSCOPY      HX ENDOSCOPY      HX GI      COLONOSCOPY    HX HEENT      WISDOM TEETH    HX HYSTERECTOMY  2000S    HX MASTECTOMY Right 1989    HX MASTECTOMY Left 2001    HX ORTHOPAEDIC  2008    LUMBAR FUSION    HX ORTHOPAEDIC  2018    RE-DO LUMBAR FUSION, AND ADDED THORACIC FUSION    HX ORTHOPAEDIC  03/26/2019    neckectomy    HX TUBAL LIGATION  1981    HX UROLOGICAL  2000s    BLADDER SLING    TOTAL HIP ARTHROPLASTY Left 11/16/2016    ANTERIOR APPROACH, DR Gwendolyn De La Torre; (POSTOP: STANDS ONE INCH TALLER ON LEFT FOOT)    TOTAL HIP ARTHROPLASTY Right 12/2016    ANTERIOR APPROACH (DR Gwendolyn De La Torre)      Prior to Admission medications    Medication Sig Start Date End Date Taking? Authorizing Provider   furosemide (LASIX) 40 mg tablet Take 1 Tab by mouth daily. 9/10/19  Yes Lis Villagomez MD   mirabegron ER [facility]) 50 mg ER tablet Take 50 mg by mouth daily.    Yes Provider, Historical   SITagliptin (JANUVIA) 100 mg tablet Take 1 Tab by mouth daily. 8/9/19  Yes Chuck Sevilla MD   glimepiride (AMARYL) 4 mg tablet Take 1 Tab by mouth every morning. 8/9/19  Yes Chuck Sevilla MD   montelukast sodium (SINGULAIR PO) Take  by mouth. Yes Provider, Historical   pantoprazole (PROTONIX) 20 mg tablet Take 20 mg by mouth daily. Yes Provider, Historical   baclofen (LIORESAL) 10 mg tablet Take 1 Tab by mouth three (3) times daily. 7/25/19  Yes Tanna Archibald MD   multivitamin, tx-iron-ca-min (THERA-M W/ IRON) 9 mg iron-400 mcg tab tablet Take 1 Tab by mouth daily. Yes Provider, Historical   pravastatin (PRAVACHOL) 80 mg tablet Take 1 Tab by mouth nightly. 6/7/19  Yes Chuck Sevilla MD   [facility] 15 mg capsule TAKE 2 CAPSULES AT BEDTIME 6/7/19  Yes Chuck Sevilla MD   albuterol [facility]) 90 mcg/actuation inhaler Take 1 Puff by inhalation every four (4) hours as needed for Wheezing or Shortness of Breath. 5/3/19  Yes Chuck Sevilla MD   cholecalciferol (VITAMIN D3) 1,000 unit cap Take  by mouth daily. Indications: unknown of the mg. Yes Provider, Historical   biotin-silicon diox-L-cysteine 3,000 mcg -100 mg-50 mg TbER Take 1 Tab by mouth daily. Yes Provider, Historical   glucose blood VI test strips (ACCU-CHEK CHERYL) strip Use once daily 2/11/19  Yes Chuck Sevilla MD   hydroCHLOROthiazide (HYDRODIURIL) 25 mg tablet TAKE 1 TABLET EVERY DAY 1/18/19  Yes Chuck Sevilla MD   fenofibrate nanocrystallized (TRICOR) 145 mg tablet Take 1 Tab by mouth daily. 1/15/19  Yes Chuck Sevilla MD   sertraline (ZOLOFT) 100 mg tablet TAKE 1 TABLET BY MOUTH DAILY  Patient taking differently: Take 100 mg by mouth daily. TAKE 1 TABLET BY MOUTH DAILY 7/6/18  Yes Chuck Sevilla MD   multivitamin (ONE A DAY) tablet Take 1 Tab by mouth daily. Yes Provider, Historical   azelaic acid (FINACEA) 15 % topical gel Apply  to affected area two (2) times a day.    Yes Provider, Historical   clindamycin (CLINDAGEL) 1 % topical gel Apply  to affected area two (2) times a day. use thin film on affected area   Yes Provider, Historical   cefUROXime (CEFTIN) 250 mg tablet Take 1 Tab by mouth two (2) times a day. 9/10/19   Cristine Aguirre MD     Allergies   Allergen Reactions    Metformin Diarrhea    Statins-Hmg-Coa Reductase Inhibitors Myalgia     Myalgia with  multiple statins  Takes pravachol     Codeine Rash     Rash on thighs    Darvon [Propoxyphene] Other (comments)     \"I see worms\"    Pcn [Penicillins] Rash    Sulfa (Sulfonamide Antibiotics) Rash      Social History     Tobacco Use    Smoking status: Never Smoker    Smokeless tobacco: Never Used   Substance Use Topics    Alcohol use: No      Family History   Problem Relation Age of Onset    Heart Disease Mother     Kidney Disease Mother     Lung Disease Mother         COPD    Diabetes Brother     Pacemaker Brother     Kidney Disease Brother     Hypertension Brother     Anesth Problems Neg Hx         Subjective:      Avery Pham is a 69 y.o. ZIHK with history of cervical spinal stenosis status post surgery, chronic kidney disease, coagulation disorder, depression, diabetes, hyperlipidemia vascular necrosis bilateral hips, polymyalgia rheumatica, hypertension, interstitial lung disease and IBS who was referred here by Dr. Courtney Santos for further evaluation of her gait issues. Per patient condition started around December 2018. Patient started having problems with walking. Feels wobbly and stiff. Cervical MRI without contrast done 2/21/2019 revealed straightening of the cervical spine. Grade 1 anterolisthesis of C7 on T1 measuring 3 mm. Grade 1 anterolisthesis of T1 on T2 measuring 3 mm. Diffusely heterogeneous marrow signal without a suspicious focal osseous lesion.   C1-C2, there is degenerative changes at the atlantodental joint, with a small cyst at the posterior aspect of the left dens measuring 6 x 3 mm, likely representing a synovial cyst. C2-C3, severe left and mild right facet arthropathy. C3-C4, diffuse disc osteophyte complex with bilateral uncovertebral spurring, right worse than left. Severe bilateral facet arthropathy. Severe right and mild left neural foraminal stenosis. C4-C5, diffuse disc osteophyte complex with bilateral uncovertebral spurring. Severe bilateral facet arthropathy. Mild bilateral neural foraminal stenosis. C5-C6, diffuse disc osteophyte complex with bilateral uncovertebral spurring, right worse than left. Superimposed small central disc extrusion with inferior migration. Severe bilateral facet arthropathy. Moderate spinal canal stenosis with indentation of the ventral cord. Severe bilateral neural foraminal stenosis. C6-C7, diffuse disc osteophyte complex with bilateral uncovertebral spurring. Severe bilateral facet arthropathy. Mild spinal canal stenosis. Severe bilateral neural foraminal stenosis. C7-T1, grade 1 anterolisthesis with uncovering of the disc. Severe bilateral facet arthropathy. Mild spinal canal stenosis. Severe bilateral neural foraminal stenosis. At T2-T3, there is a diffuse disc bulge which results in spinal canal stenosis with indentation of the ventral cord. Severe facet arthropathy is also noted at T2-T3. Patient was seen by orthopedics and underwent cervical surgery 3/26/2019. No benefit with her walking issues. Denies any weakness or loss of muscle bulk. Denies any falls. Better with a cane or walker. She is undergoing PT. She also mentions numbness and tingling left foot. She has had prior lumbar surgeries x 2 (last was 7/2018). Patient was seen by her orthopedic doctor last 5/16/2019. Note mentions patient is 7 weeks post operation. Note mentions it from a cervical spine standpoint she is doing well. No neck pain. Patient is not taking any medication for pain relief. She is working with home health PT and OT.   Note mentions some slight improvement was still having balance issues. Also breathing seems to be worse. Plan was to continue using soft collar and home PT and OT. Patient was also referred to neurology and rheumatology regarding her balance issues. Patient was last seen by her PCP 6/11/2019. Note mentions issues with diabetic control. It also mentions patient was recently on prednisone for interstitial lung disease. BMP done revealed elevated glucose at 183. Last hemoglobin A1c done 4/16/2019 was elevated at 7.4. Patient underwent EMG/NCS of bilateral lower extremity 6/21/2019 and it was normal.  No evidence of generalized large fiber neuropathy, myopathy or significant residual lumbar radiculopathy. Since the last visit on 7/25/2019, patient reports some added benefit with increase of baclofen to 10 mg 3 times daily. Denies any side effects. Asking if it can be further increased. It is offering benefit with improvement of her walking and reduction in the stiffness and episodes of muscle spasm. She still reports episodic bilateral rib below the breast muscle spasm with certain movements. She is stable with a cane. Denies any falls. She apparently stopped doing therapy because of cost.  She did try doing water therapy and her brothers pool. Head CT without contrast done 9/13/2019 to assess for any central cause of her gait instability was unremarkable. No acute process. Patient reports seeing a urologist and is currently undergoing treatment for her urinary issues. Review of records reveal patient was last seen by her PCP 8/9/2019 for issues with blood sugar control. Note mentions patient having problems with metformin side effect and is on Amaryl 2 mg a day. Sugars have been running on the average to 222 to 240. Plan was to increase Amaryl to 4 mg daily and Januvia was added at 100 mg daily. Labs done 7/24/2019 revealed elevated hemoglobin A1c at 7.5, CMP showing elevated glucose, AST and ALT.   Lipid panel was unremarkable. CK was normal.  BMP done 9/10/2019 revealed elevated glucose. Outside reports reviewed: office note, labs    Review of Systems:    A comprehensive review of systems was performed:   Positive for fatigue, blurry vision, sinus problems, shortness of breath, increased urinary frequency, incontinence, muscle aches, weakness, back pain, balance issues, easy bruising    Objective:     Visit Vitals  Pulse 88   Ht 5' 4\" (1.626 m)   Wt 193 lb (87.5 kg)   SpO2 98%   BMI 33.13 kg/m²       PHYSICAL EXAM:    NEUROLOGICAL EXAM:    Appearance: The patient is obese, provides a coherent history and is in no acute distress. Mental Status: Oriented to time, place and person. Fluent, no aphasia or dysarthria. Mood and affect appropriate. Cranial Nerves:   II - XII were intact. Motor:  5/5 strength. Normal bulk and tone. No fasciculations. No pronator drift. Reflexes:   Deep tendon reflexes 1+/4 and symmetrical. Downgoing toes. Sensory:   Normal to cold, pinprick and vibration except decrease vibration toes. Gait:  Less spastic and scissoring gait. No Romberg. Problems with tandem walking. Tremor:   No tremor noted. Cerebellar:  Intact FTN/PARMINDER/HTS. Assessment:   Cervical spondylosis and myelopathy  Lumbar spondylosis    Plan:   Neurological examination is unchanged. It reveals less spastic and scissoring gait. Findings typically seen in upper motor neuron conditions spinal stenosis with myelopathy. Head CT without contrast was negative. Cervical MRI prior to surgery was reviewed. Reveals significant multilevel spondylosis with neuroforaminal narrowings and spinal stenosis worse at C5-6 and T2-3. Status post surgery. Likely dealing with residual changes from the myelopathy especially given normal LE EMG/NCS. Still having breakthrough muscle spasm. Trial of increase dose of Baclofen by 5 mg increments every week to a goal of 15 mg TID. Prescriptions provided.   Further titration will depend upon response and tolerability. Patient also with history of lumbar spondylosis status post surgeries. This may be contributing to her gait issues as well. EMG/NCS of bilateral lower extremity was normal and did not reveal any significant residual lumbar radiculopathy. Advised to restart PT and OT when able. Encouraged to start aquatic exercises. Fall precautions. All questions and concerns were answered. This note was created using voice recognition software. Despite editing, there may be syntax errors.

## 2019-09-25 NOTE — PATIENT INSTRUCTIONS
Cervical Myelopathy: Care Instructions Your Care Instructions Cervical myelopathy is a problem caused by pressure on your spinal cord. This pressure may come from a bone spur. Or it may come from a herniated disc. In some cases, aging makes the discs and ligaments around the spine thicker. This can also put pressure on the spinal cord. Cervical myelopathy can cause different symptoms. It may cause numbness and pain. It may also cause weakness in the arms, hands, and legs. For some people, it's hard to walk. Others have neck pain or problems with urination and bowel movements. Treatment depends on the cause and your symptoms. You may need regular checkups or you may need surgery to help take pressure off the nerve. Follow-up care is a key part of your treatment and safety. Be sure to make and go to all appointments, and call your doctor if you are having problems. It's also a good idea to know your test results and keep a list of the medicines you take. How can you care for yourself at home? · Be safe with medicines. Read and follow all instructions on the label. ? If the doctor gave you a prescription medicine for pain, take it as prescribed. ? If you are not taking a prescription pain medicine, ask your doctor if you can take an over-the-counter medicine. · Do exercises recommended by your doctor or physical therapist. Strong muscles can help you do everyday tasks. · If you have trouble using your hands, put Velcro or snaps on clothes. This can make your clothes easier to get on and off. · If you have trouble walking, you can make life easier and safer. Use rails and nonskid tape at home. Try a cane or walker to get around. · Do not smoke. Smoking can slow bone healing. If you need help quitting, talk to your doctor about stop-smoking programs and medicines. These can increase your chances of quitting for good. · Pace yourself. Everyday activities may take longer than before.  Give yourself more time to get things done. This may lower your stress. When should you call for help? Call 911 anytime you think you may need emergency care. For example, call if: 
  · You are unable to move an arm or a leg at all.  
[facility] your doctor now or seek immediate medical care if: 
  · You have new or worse symptoms in your arms, legs, belly, or buttocks. Symptoms may include: 
? Numbness or tingling. ? Weakness. ? Pain.  
  · You lose bladder or bowel control.  
 Watch closely for changes in your health, and be sure to contact your doctor if: 
  · You do not get better as expected. Where can you learn more? Go to http://malia-lincoln.info/. Enter T720 in the search box to learn more about \"Cervical Myelopathy: Care Instructions. \" Current as of: June 26, 2019 Content Version: 12.2 © 4901-1473 [employer]. Care instructions adapted under license by [employer] (which disclaims liability or warranty for this information). If you have questions about a medical condition or this instruction, always ask your healthcare professional. [place] [address] any warranty or liability for your use of this information. Low Back Arthritis: Exercises Introduction Here are some examples of typical rehabilitation exercises for your condition. Start each exercise slowly. Ease off the exercise if you start to have pain. Your doctor or physical therapist will tell you when you can start these exercises and which ones will work best for you. When you are not being active, find a comfortable position for rest. Some people are comfortable on the floor or a medium-firm bed with a small pillow under their head and another under their knees. Some people prefer to lie on their side with a pillow between their knees. Don't stay in one position for too long. Take short walks (10 to 20 minutes) every 2 to 3 hours.  Avoid slopes, hills, and stairs until you feel better. Walk only distances you can manage without pain, especially leg pain. How to do the exercises Pelvic tilt 1. Lie on your back with your knees bent. 2. \"Brace\" your stomachtighten your muscles by pulling in and imagining your belly button moving toward your spine. 3. Press your lower back into the floor. You should feel your hips and pelvis rock back. 4. Hold for 6 seconds while breathing smoothly. 5. Relax and allow your pelvis and hips to rock forward. 6. Repeat 8 to 12 times. Back stretches 1. Get down on your hands and knees on the floor. 2. Relax your head and allow it to droop. Round your back up toward the ceiling until you feel a nice stretch in your upper, middle, and lower back. Hold this stretch for as long as it feels comfortable, or about 15 to 30 seconds. 3. Return to the starting position with a flat back while you are on your hands and knees. 4. Let your back sway by pressing your stomach toward the floor. Lift your buttocks toward the ceiling. 5. Hold this position for 15 to 30 seconds. 6. Repeat 2 to 4 times. Follow-up care is a key part of your treatment and safety. Be sure to make and go to all appointments, and call your doctor if you are having problems. It's also a good idea to know your test results and keep a list of the medicines you take. Where can you learn more? Go to http://malia-lincoln.info/. Enter M713 in the search box to learn more about \"Low Back Arthritis: Exercises. \" Current as of: June 26, 2019 Content Version: 12.2 © 8543-2928 [employer]. Care instructions adapted under license by [employer] (which disclaims liability or warranty for this information). If you have questions about a medical condition or this instruction, always ask your healthcare professional. [address] any warranty or liability for your use of this information.

## 2019-10-15 ENCOUNTER — HOSPITAL ENCOUNTER (OUTPATIENT)
Dept: GENERAL RADIOLOGY | Age: 74
Discharge: HOME | End: 2019-10-15
Payer: MEDICARE

## 2019-10-15 DIAGNOSIS — R05.9 COUGH: ICD-10-CM

## 2019-10-15 PROCEDURE — 71046 X-RAY EXAM CHEST 2 VIEWS: CPT

## 2019-10-22 DIAGNOSIS — F32.A DEPRESSION, UNSPECIFIED DEPRESSION TYPE: ICD-10-CM

## 2019-10-22 RX ORDER — SERTRALINE HYDROCHLORIDE 100 MG/1
TABLET, FILM COATED ORAL
Qty: 90 TAB | Refills: 3 | Status: SHIPPED | OUTPATIENT
Start: 2019-10-22 | End: 2020-07-30 | Stop reason: SDUPTHER

## 2019-10-22 NOTE — TELEPHONE ENCOUNTER
RX refill request from the patient/pharmacy. Patient last seen 09- with labs, and next appt. scheduled for 10-  Requested Prescriptions     Pending Prescriptions Disp Refills    sertraline (ZOLOFT) 100 mg tablet [Pharmacy Med Name: *SERTRALINE 100MG] 90 Tab 3     Sig: TAKE 1 TABLET BY MOUTH DAILY   .

## 2019-11-12 NOTE — PROGRESS NOTES
Chief Complaint   Patient presents with    Hypertension     3 month follow up    Diabetes    Chronic Kidney Disease       SUBJECTIVE:    Wing Freed is a 69 y.o. female who returns today in follow-up of her medical problems include hypertension, diabetes, hyperlipidemia, DJD, interstitial lung disease, allergic rhinitis, obesity, history of depression and other medical problems. In addition to her chronic problems she has a long list of acute issues going on. One is she has noted that her blood sugars have been running up as high as 400 so she started taking metformin 500 mg once daily along with her Januvia 100 mg daily and Amaryl 4 mg daily. She had previously taken metformin but it caused diarrhea on high doses although it does not seem to be much of an issue with the 500 mg once a day. She still notes her blood sugars seem to be running in the 200 range on this. Her next issue seem to be a cough with some dry hoarseness and fatigue she has had a evaluation by the nurse practitioner for pulmonary as well as Dr. Margaret Avery himself and she was referred for speech evaluation but the speech therapist told her she was having reflux and that she knew more about swallowing and reflux and Dr. Margaret Avery did so she was not happy with the  speech pathologist.  She seems to be having some problems with muscle weakness and her balance seems to be a little off. She also is noting that she has an overactive bladder and she has been evaluated and followed by urology for that and is currently on Myrbetriq which seems to be helping but does give her a dry mouth. She notes that her right nostril seems to have a growth and it and she has been using some Flonase nasal spray given to her by pulmonary. She does need a 90-day prescription for all of her medicines sent to [facility].        She denies any increase of her chronic shortness of breath and has no chest pain, palpitations, PND, orthopnea or other cardiorespiratory complaints except for the cough. She has no GI or  complaints except for the overactive bladder and she does not feel like she has reflux and she was initially taking Pepcid and recently switch to Protonix. She denies any headaches but does note some gait instability and balance seems to be off. She has some chronic arthritic complaints that have not changed. There are no other complaints other than those noted above. She has previously been evaluated by Dr. Tabatha Ventura for rheumatologic problems and would like a appointment to see her again. Current Outpatient Medications   Medication Sig Dispense Refill    clemastine 2.68 mg tab tablet clemastine 2.68 mg tablet      buPROPion XL (WELLBUTRIN XL) 150 mg tablet Wellbutrin  mg 24 hr tablet, extended release      metFORMIN (GLUCOPHAGE) 500 mg tablet Take  by mouth two (2) times daily (with meals). Indications: takes 1 at breakfast and 2 pills at dinner      carvedilol (COREG) 6.25 mg tablet       sertraline (ZOLOFT) 100 mg tablet TAKE 1 TABLET BY MOUTH DAILY 90 Tab 3    baclofen (LIORESAL) 10 mg tablet Take 2 Tabs by mouth three (3) times daily. 180 Tab 2    cefUROXime (CEFTIN) 250 mg tablet Take 1 Tab by mouth two (2) times a day. 20 Tab 0    furosemide (LASIX) 40 mg tablet Take 1 Tab by mouth daily. 30 Tab prn    mirabegron ER (MYRBETRIQ) 50 mg ER tablet Take 50 mg by mouth daily.  SITagliptin (JANUVIA) 100 mg tablet Take 1 Tab by mouth daily. 90 Tab prn    glimepiride (AMARYL) 4 mg tablet Take 1 Tab by mouth every morning. 90 Tab prn    montelukast sodium (SINGULAIR PO) Take  by mouth.  pantoprazole (PROTONIX) 20 mg tablet Take 20 mg by mouth daily.  multivitamin, tx-iron-ca-min (THERA-M W/ IRON) 9 mg iron-400 mcg tab tablet Take 1 Tab by mouth daily.  pravastatin (PRAVACHOL) 80 mg tablet Take 1 Tab by mouth nightly.  90 Tab 3    oxazepam (SERAX) 15 mg capsule TAKE 2 CAPSULES AT BEDTIME 60 Cap 2    albuterol (PROAIR HFA) 90 mcg/actuation inhaler Take 1 Puff by inhalation every four (4) hours as needed for Wheezing or Shortness of Breath. 1 Inhaler prn    cholecalciferol (VITAMIN D3) 1,000 unit cap Take  by mouth daily. Indications: unknown of the mg.  biotin-silicon diox-L-cysteine 3,000 mcg -100 mg-50 mg TbER Take 1 Tab by mouth daily.  glucose blood VI test strips (ACCU-CHEK CHERYL) strip Use once daily 50 Strip prn    hydroCHLOROthiazide (HYDRODIURIL) 25 mg tablet TAKE 1 TABLET EVERY DAY 90 Tab 3    fenofibrate nanocrystallized (TRICOR) 145 mg tablet Take 1 Tab by mouth daily. 30 Tab 11    multivitamin (ONE A DAY) tablet Take 1 Tab by mouth daily.  azelaic acid (FINACEA) 15 % topical gel Apply  to affected area two (2) times a day.  clindamycin (CLINDAGEL) 1 % topical gel Apply  to affected area two (2) times a day. use thin film on affected area       Past Medical History:   Diagnosis Date    Abnormal LFTs 08/24/2017    FATTY LIVER    Arthritis     OSTEO    Avascular necrosis of hip ([address] 08/24/2017    BILAT HIPS    Breast CA ([address] 1989, 2001    BILATERAL; SURGERY, CHEMO    Chronic pain     CKD (chronic kidney disease), stage II 8/24/2017    Coagulation disorder ([address] 1984    ITP  (DR CHU BRADSHAW) - PLATELETS DROPPED TO 5K    Depression     Diabetes ([address]     TYPE 2; NIDDM    DJD (degenerative joint disease), multiple sites 8/24/2017    GI bleed 2008    HEMORRHOIDS    Hyperlipemia     Hypertension with renal disease 8/24/2017    IBS (irritable bowel syndrome) 8/24/2017    Ill-defined condition 2015    PNEUMONIA X2 - HOSPITALIZED IN 2015    Interstitial lung disease ([address] 04/01/2019    Liver disease     FATTY LIVER    Myalgia 8/24/2017    On statin therapy 8/24/2017    Overactive bladder 8/24/2017    Pneumonia 04/2015    HOSPITALIZED 3 WEEKS.     Polymyalgia rheumatica (HCC) 8/24/2017    Prophylactic antibiotic 8/24/2017    Reflex abnormality     acid reflex    Rosacea      Past Surgical History:   Procedure Laterality Date    BREAST SURGERY PROCEDURE UNLISTED  1993, 2002    RECONSTRUCTION X2    HX APPENDECTOMY  1950    HX BREAST BIOPSY Bilateral     HX CATARACT REMOVAL Bilateral     HX COLONOSCOPY      HX ENDOSCOPY      HX GI      COLONOSCOPY    HX HEENT      WISDOM TEETH    HX HYSTERECTOMY  2000S    HX MASTECTOMY Right 1989    HX MASTECTOMY Left 2001    HX ORTHOPAEDIC  2008    LUMBAR FUSION    HX ORTHOPAEDIC  2018    RE-DO LUMBAR FUSION, AND ADDED THORACIC FUSION    HX ORTHOPAEDIC  03/26/2019    neckectomy    HX TUBAL LIGATION  1981    HX UROLOGICAL  2000s    BLADDER SLING    TOTAL HIP ARTHROPLASTY Left 11/16/2016    ANTERIOR APPROACH, DR Gwendolyn De La Torre; (POSTOP: STANDS ONE INCH TALLER ON LEFT FOOT)    TOTAL HIP ARTHROPLASTY Right 12/2016    ANTERIOR APPROACH (DR JAIME)     Allergies   Allergen Reactions    Metformin Diarrhea    Statins-Hmg-Coa Reductase Inhibitors Myalgia     Myalgia with  multiple statins  Takes pravachol     Codeine Rash     Rash on thighs    Darvon [Propoxyphene] Other (comments)     \"I see worms\"    Pcn [Penicillins] Rash    Sulfa (Sulfonamide Antibiotics) Rash       REVIEW OF SYSTEMS:  General: negative for - chills or fever, or weight loss or gain  ENT: negative for - headaches, nasal congestion or tinnitus  Eyes: no blurred or visual changes  Neck: No stiffness or swollen nodes  Respiratory: negative for -  hemoptysis or wheezing. Positive for cough and dyspnea on exertion and she does get some dry heaves but the coughing  Cardiovascular : negative for - chest pain, edema, palpitations or shortness of breath  Gastrointestinal: negative for - abdominal pain, blood in stools, heartburn or nausea/vomiting  Genito-Urinary: no dysuria, trouble voiding, or hematuria. She does have some urinary incontinence and wears a pad for that  Musculoskeletal: negative for -  joint pain, joint stiffness or joint swelling.   She feels like she has some muscle weakness and balance was off  Neurological: no TIA or stroke symptoms  Hematologic: no bruises, no bleeding  Lymphatic: no swollen glands  Integument: no lumps, mole changes, nail changes or rash  Endocrine:no malaise/lethargy poly uria or polydipsia or unexpected weight changes but blood sugars have been running high as noted        Social History     Socioeconomic History    Marital status:      Spouse name: Not on file    Number of children: Not on file    Years of education: Not on file    Highest education level: Not on file   Tobacco Use    Smoking status: Never Smoker    Smokeless tobacco: Never Used   Substance and Sexual Activity    Alcohol use: No    Drug use: No    Sexual activity: Never     Family History   Problem Relation Age of Onset    Heart Disease Mother     Kidney Disease Mother     Lung Disease Mother         COPD    Diabetes Brother     Pacemaker Brother     Kidney Disease Brother     Hypertension Brother     Anesth Problems Neg Hx        OBJECTIVE:     Visit Vitals  /80 (BP 1 Location: Left arm, BP Patient Position: Sitting)   Pulse 98   Temp 98.8 °F (37.1 °C) (Oral)   Resp 21   Ht 5' 4\" (1.626 m)   Wt 192 lb 1.6 oz (87.1 kg)   SpO2 96%   BMI 32.97 kg/m²     CONSTITUTIONAL:   well nourished, appears age appropriate  EYES: sclera anicteric, PERRL, EOMI  ENMT:nares clear except a small benign-appearing polyp in the right, moist mucous membranes, pharynx clear  NECK: supple.  Thyroid normal, No JVD or bruits  RESPIRATORY: Chest: clear to ascultation and percussion without rales, rhonchi or wheezes, normal inspiratory effort  CARDIOVASCULAR: Heart: regular rate and rhythm no murmurs, rubs or gallops, PMI not displaced, No thrills  GASTROINTESTINAL: Abdomen: non distended, soft, non tender, bowel sounds normal  HEMATOLOGIC: no purpura, petechiae or bruising  LYMPHATIC: No lymph node enlargemant  MUSCULOSKELETAL: Extremities: no edema or active synovitis, pulse 1+. She does walk without assistive device  INTEGUMENT: No unusual rashes or suspicious skin lesions noted. Nails appear normal.  PERIPHERAL VASCULAR: normal pulses femoral, PT and DP  NEUROLOGIC: non-focal exam, A & O X 3  PSYCHIATRIC:, appropriate affect     ASSESSMENT:   1. Hypertension with renal disease    2. Controlled type 2 diabetes mellitus with stage 2 chronic kidney disease, with long-term current use of insulin ([address]    3. Mixed hyperlipidemia    4. CKD (chronic kidney disease), stage II    5. Primary osteoarthritis involving multiple joints    6. Interstitial lung disease (HCC)    7. Class 1 obesity due to excess calories without serious comorbidity with body mass index (BMI) of 33.0 to 33.9 in adult    8. Fatigue, unspecified type    9. Encounter for immunization    10. Overactive bladder    11. Other fatigue    12. Gait instability    13. Cough      Impression  1. Hypertension that is adequately controlled so continue current medicines reviewed with her. 2.  Diabetes mellitus repeat status pending and prior lab reviewed and I will make adjustments if necessary. Currently taking Amaryl 4 mg daily, Januvia 100 daily and recently started metformin 500 daily. 3.  Hyperlipidemia prior lab reviewed and repeat status pending and I will adjust if needed. Next #4 CKD stage II repeat status pending  5. DJD that is stable although she is having some problems and wants to see a rheumatologist thus I will make a referral to Dr. John Hall who she has seen before. 6.  Interstitial lung disease unclear etiology she is already seen pulmonary Dr. Trey Abdi and I told her that we will continue to do that. I will get speech therapy involved just to make sure she is not having any aspiration with her swallowing. Hopefully they will do modified barium swallow which have scheduled. 7.  Obesity we did discuss diet, exercise and weight reduction.   8.  Fatigue I think that is probably related to her interstitial lung disease although her O2 sat today is 96% on room air  9. Overactive bladder that is being followed and managed currently by Dr. Monserrat Anand of urology and I will leave that treatment to him  10. Gait instability we may need to set her up for some physical therapy but I will see what the labs look like first  11. Cough possibly related to aspiration thus the modified barium swallow  All the prescriptions are refilled for 90 days  45 minutes spent on this office visit today with greater than 50% in counseling coordination of care. I will call with lab results and make further recommendations or adjustments if necessary. Follow-up scheduled again for 3 months tentatively or sooner should the need to based upon the above. PLAN:  .  Orders Placed This Encounter    Influenza Vaccine Inactivated (IIV)(FLUAD), Subunit, Adjuvanted, IM, (21260)    METABOLIC PANEL, COMPREHENSIVE ([facility])    LIPID PANEL ([facility])    CK ([facility])    HEMOGLOBIN A1C W/O EAG ([facility] [facility])    CBC WITH AUTOMATED DIFF    T4, FREE ([facility] [facility])    TSH 3RD GENERATION ([facility])    REFERRAL TO SPEECH THERAPY    clemastine 2.68 mg tab tablet    buPROPion XL (WELLBUTRIN XL) 150 mg tablet    DISCONTD: clindamycin (CLEOCIN) 300 mg capsule    metFORMIN (GLUCOPHAGE) 500 mg tablet    carvedilol (COREG) 6.25 mg tablet         ATTENTION:   This medical record was transcribed using an electronic medical records system. Although proofread, it may and can contain electronic and spelling errors. Other human spelling and other errors may be present. Corrections may be executed at a later time. Please feel free to contact us for any clarifications as needed. Follow-up and Dispositions    · Return in about 3 months (around 2/13/2020).          Results for orders placed or performed in visit on 49/17/21   METABOLIC PANEL, COMPREHENSIVE   Result Value Ref Range    Glucose 233 (H) 65 - 105 mg/dL    BUN 32.0 (H) 7.0 - 17.0 mg/dL    Creatinine 1.1 0.7 - 1.2 mg/dL    Sodium 140 137 - 145 mmol/L    Potassium 3.2 (L) 3.6 - 5.0 mmol/L    Chloride 93 (L) 98 - 107 mmol/L    CO2 33.0 (H) 22.0 - 32.0 mmol/L    Calcium 10.8 (H) 8.4 - 10.2 mg/dl    Protein, total 7.9 6.3 - 8.2 g/dL    Albumin 4.5 3.9 - 5.4 g/dL    ALT (SGPT) 32 0 - 35 U/L    AST (SGOT) 50.0 (H) 14.0 - 36.0 U/L    Alk. phosphatase 94 38 - 126 U/L    Bilirubin, total 0.7 0.2 - 1.3 mg/dL    BUN/Creatinine ratio 29 Ratio    GFR est AA 59 <60 mL/min/1.73m2    GFR est non-AA 49 <60 mL/min/1.73m2    Globulin 3.40     A-G Ratio 1.3 Ratio    Anion gap 14 mmol/L   LIPID PANEL   Result Value Ref Range    Cholesterol, total 163 0 - 200 mg/dL    Triglyceride 219 (H) 0 - 200 mg/dL    HDL Cholesterol 39 35 - 130 mg/dL    VLDL 44 mg/dL    LDL, calculated 80 0 - 130 mg/dL    CHOL/HDL Ratio 4 0 - 4 Ratio    LDL/HDL Ratio 2 Ratio   CK   Result Value Ref Range    CK 80.00 30.00 - 135.00 U/L   HEMOGLOBIN A1C W/O EAG   Result Value Ref Range    Hemoglobin A1c 7.3 (H) 4.0 - 5.7 %       Jason Iverson MD    The patient verbalized understanding of the problems and plans as explained.

## 2019-11-13 ENCOUNTER — OFFICE VISIT (OUTPATIENT)
Dept: INTERNAL MEDICINE CLINIC | Age: 74
End: 2019-11-13

## 2019-11-13 VITALS
TEMPERATURE: 98.8 F | RESPIRATION RATE: 21 BRPM | HEART RATE: 98 BPM | SYSTOLIC BLOOD PRESSURE: 120 MMHG | DIASTOLIC BLOOD PRESSURE: 80 MMHG | WEIGHT: 192.1 LBS | HEIGHT: 64 IN | BODY MASS INDEX: 32.8 KG/M2 | OXYGEN SATURATION: 96 %

## 2019-11-13 DIAGNOSIS — J84.9 INTERSTITIAL LUNG DISEASE (HCC): ICD-10-CM

## 2019-11-13 DIAGNOSIS — R26.81 GAIT INSTABILITY: ICD-10-CM

## 2019-11-13 DIAGNOSIS — E78.2 MIXED HYPERLIPIDEMIA: ICD-10-CM

## 2019-11-13 DIAGNOSIS — Z23 ENCOUNTER FOR IMMUNIZATION: ICD-10-CM

## 2019-11-13 DIAGNOSIS — N18.2 CKD (CHRONIC KIDNEY DISEASE), STAGE II: ICD-10-CM

## 2019-11-13 DIAGNOSIS — E66.09 CLASS 1 OBESITY DUE TO EXCESS CALORIES WITHOUT SERIOUS COMORBIDITY WITH BODY MASS INDEX (BMI) OF 33.0 TO 33.9 IN ADULT: ICD-10-CM

## 2019-11-13 DIAGNOSIS — R05.9 COUGH: ICD-10-CM

## 2019-11-13 DIAGNOSIS — N32.81 OVERACTIVE BLADDER: ICD-10-CM

## 2019-11-13 DIAGNOSIS — M15.9 PRIMARY OSTEOARTHRITIS INVOLVING MULTIPLE JOINTS: ICD-10-CM

## 2019-11-13 DIAGNOSIS — Z79.4 CONTROLLED TYPE 2 DIABETES MELLITUS WITH STAGE 2 CHRONIC KIDNEY DISEASE, WITH LONG-TERM CURRENT USE OF INSULIN (HCC): ICD-10-CM

## 2019-11-13 DIAGNOSIS — I12.9 HYPERTENSION WITH RENAL DISEASE: Primary | ICD-10-CM

## 2019-11-13 DIAGNOSIS — E11.22 CONTROLLED TYPE 2 DIABETES MELLITUS WITH STAGE 2 CHRONIC KIDNEY DISEASE, WITH LONG-TERM CURRENT USE OF INSULIN (HCC): ICD-10-CM

## 2019-11-13 DIAGNOSIS — N18.2 CONTROLLED TYPE 2 DIABETES MELLITUS WITH STAGE 2 CHRONIC KIDNEY DISEASE, WITH LONG-TERM CURRENT USE OF INSULIN (HCC): ICD-10-CM

## 2019-11-13 DIAGNOSIS — R53.83 FATIGUE, UNSPECIFIED TYPE: ICD-10-CM

## 2019-11-13 DIAGNOSIS — R53.83 OTHER FATIGUE: ICD-10-CM

## 2019-11-13 LAB
A-G RATIO,AGRAT: 1.3 RATIO
ALBUMIN SERPL-MCNC: 4.5 G/DL (ref 3.9–5.4)
ALP SERPL-CCNC: 94 U/L (ref 38–126)
ALT SERPL-CCNC: 32 U/L (ref 0–35)
ANION GAP SERPL CALC-SCNC: 14 MMOL/L
AST SERPL W P-5'-P-CCNC: 50 U/L (ref 14–36)
BILIRUB SERPL-MCNC: 0.7 MG/DL (ref 0.2–1.3)
BUN SERPL-MCNC: 32 MG/DL (ref 7–17)
BUN/CREATININE RATIO,BUCR: 29 RATIO
CALCIUM SERPL-MCNC: 10.8 MG/DL (ref 8.4–10.2)
CHLORIDE SERPL-SCNC: 93 MMOL/L (ref 98–107)
CHOL/HDL RATIO,CHHD: 4 RATIO (ref 0–4)
CHOLEST SERPL-MCNC: 163 MG/DL (ref 0–200)
CK SERPL-CCNC: 80 U/L (ref 30–135)
CO2 SERPL-SCNC: 33 MMOL/L (ref 22–32)
CREAT SERPL-MCNC: 1.1 MG/DL (ref 0.7–1.2)
GLOBULIN,GLOB: 3.4
GLUCOSE SERPL-MCNC: 233 MG/DL (ref 65–105)
HBA1C MFR BLD HPLC: 7.3 % (ref 4–5.7)
HDLC SERPL-MCNC: 39 MG/DL (ref 35–130)
LDL/HDL RATIO,LDHD: 2 RATIO
LDLC SERPL CALC-MCNC: 80 MG/DL (ref 0–130)
POTASSIUM SERPL-SCNC: 3.2 MMOL/L (ref 3.6–5)
PROT SERPL-MCNC: 7.9 G/DL (ref 6.3–8.2)
SODIUM SERPL-SCNC: 140 MMOL/L (ref 137–145)
TRIGL SERPL-MCNC: 219 MG/DL (ref 0–200)
VLDLC SERPL CALC-MCNC: 44 MG/DL

## 2019-11-13 RX ORDER — METFORMIN HYDROCHLORIDE 500 MG/1
TABLET ORAL 2 TIMES DAILY WITH MEALS
COMMUNITY
End: 2020-03-18 | Stop reason: ALTCHOICE

## 2019-11-13 RX ORDER — CLINDAMYCIN HYDROCHLORIDE 300 MG/1
CAPSULE ORAL
Refills: 0 | COMMUNITY
Start: 2019-10-17 | End: 2019-11-13 | Stop reason: ALTCHOICE

## 2019-11-13 RX ORDER — CLEMASTINE FUMARATE 2.68 MG/1
TABLET ORAL
COMMUNITY
End: 2020-08-13 | Stop reason: ALTCHOICE

## 2019-11-13 RX ORDER — CARVEDILOL 6.25 MG/1
TABLET ORAL
COMMUNITY
Start: 2019-09-21 | End: 2020-06-16

## 2019-11-13 RX ORDER — BUPROPION HYDROCHLORIDE 150 MG/1
TABLET ORAL
COMMUNITY
End: 2020-07-30 | Stop reason: SDUPTHER

## 2019-11-13 NOTE — PATIENT INSTRUCTIONS
Body Mass Index: Care Instructions Your Care Instructions Body mass index (BMI) can help you see if your weight is raising your risk for health problems. It uses a formula to compare how much you weigh with how tall you are. · A BMI lower than 18.5 is considered underweight. · A BMI between 18.5 and 24.9 is considered healthy. · A BMI between 25 and 29.9 is considered overweight. A BMI of 30 or higher is considered obese. If your BMI is in the normal range, it means that you have a lower risk for weight-related health problems. If your BMI is in the overweight or obese range, you may be at increased risk for weight-related health problems, such as high blood pressure, heart disease, stroke, arthritis or joint pain, and diabetes. If your BMI is in the underweight range, you may be at increased risk for health problems such as fatigue, lower protection (immunity) against illness, muscle loss, bone loss, hair loss, and hormone problems. BMI is just one measure of your risk for weight-related health problems. You may be at higher risk for health problems if you are not active, you eat an unhealthy diet, or you drink too much alcohol or use tobacco products. Follow-up care is a key part of your treatment and safety. Be sure to make and go to all appointments, and call your doctor if you are having problems. It's also a good idea to know your test results and keep a list of the medicines you take. How can you care for yourself at home? · Practice healthy eating habits. This includes eating plenty of fruits, vegetables, whole grains, lean protein, and low-fat dairy. · If your doctor recommends it, get more exercise. Walking is a good choice. Bit by bit, increase the amount you walk every day. Try for at least 30 minutes on most days of the week. · Do not smoke. Smoking can increase your risk for health problems.  If you need help quitting, talk to your doctor about stop-smoking programs and medicines. These can increase your chances of quitting for good. · Limit alcohol to 2 drinks a day for men and 1 drink a day for women. Too much alcohol can cause health problems. If you have a BMI higher than 25 · Your doctor may do other tests to check your risk for weight-related health problems. This may include measuring the distance around your waist. A waist measurement of more than 40 inches in men or 35 inches in women can increase the risk of weight-related health problems. · Talk with your doctor about steps you can take to stay healthy or improve your health. You may need to make lifestyle changes to lose weight and stay healthy, such as changing your diet and getting regular exercise. If you have a BMI lower than 18.5 · Your doctor may do other tests to check your risk for health problems. · Talk with your doctor about steps you can take to stay healthy or improve your health. You may need to make lifestyle changes to gain or maintain weight and stay healthy, such as getting more healthy foods in your diet and doing exercises to build muscle. Where can you learn more? Go to http://malia-lincoln.info/. Enter S176 in the search box to learn more about \"Body Mass Index: Care Instructions. \" Current as of: March 28, 2019 Content Version: 12.2 © 8278-7545 [employer]. Care instructions adapted under license by [employer] (which disclaims liability or warranty for this information). If you have questions about a medical condition or this instruction, always ask your healthcare professional. [address] any warranty or liability for your use of this information.

## 2019-11-13 NOTE — PROGRESS NOTES
Chief Complaint   Patient presents with    Hypertension     3 month follow up    Diabetes    Chronic Kidney Disease     Visit Vitals  /80 (BP 1 Location: Left arm, BP Patient Position: Sitting)   Pulse 98   Temp 98.8 °F (37.1 °C) (Oral)   Resp 21   Ht 5' 4\" (1.626 m)   Wt 192 lb 1.6 oz (87.1 kg)   SpO2 96%   BMI 32.97 kg/m²     1. Have you been to the ER, urgent care clinic since your last visit? Hospitalized since your last visit? No    2. Have you seen or consulted any other health care providers outside of the [address] [place] [address] since your last visit? Include any pap smears or colon screening.  Dr. Lucy SANDERS

## 2019-11-14 LAB
BASOPHILS # BLD AUTO: 0.1 X10E3/UL (ref 0–0.2)
BASOPHILS NFR BLD AUTO: 1 %
EOSINOPHIL # BLD AUTO: 0.1 X10E3/UL (ref 0–0.4)
EOSINOPHIL NFR BLD AUTO: 2 %
ERYTHROCYTE [DISTWIDTH] IN BLOOD BY AUTOMATED COUNT: 12.6 % (ref 12.3–15.4)
HCT VFR BLD AUTO: 42.3 % (ref 34–46.6)
HGB BLD-MCNC: 14.6 G/DL (ref 11.1–15.9)
IMM GRANULOCYTES # BLD AUTO: 0 X10E3/UL (ref 0–0.1)
IMM GRANULOCYTES NFR BLD AUTO: 0 %
LYMPHOCYTES # BLD AUTO: 1.7 X10E3/UL (ref 0.7–3.1)
LYMPHOCYTES NFR BLD AUTO: 22 %
MCH RBC QN AUTO: 33 PG (ref 26.6–33)
MCHC RBC AUTO-ENTMCNC: 34.5 G/DL (ref 31.5–35.7)
MCV RBC AUTO: 96 FL (ref 79–97)
MONOCYTES # BLD AUTO: 0.5 X10E3/UL (ref 0.1–0.9)
MONOCYTES NFR BLD AUTO: 6 %
NEUTROPHILS # BLD AUTO: 5.7 X10E3/UL (ref 1.4–7)
NEUTROPHILS NFR BLD AUTO: 69 %
PLATELET # BLD AUTO: 358 X10E3/UL (ref 150–450)
RBC # BLD AUTO: 4.43 X10E6/UL (ref 3.77–5.28)
T4 FREE SERPL-MCNC: 1.12 NG/DL (ref 0.58–2.3)
TSH SERPL DL<=0.05 MIU/L-ACNC: 1.7 UIU/ML (ref 0.34–5.6)
WBC # BLD AUTO: 8.1 X10E3/UL (ref 3.4–10.8)

## 2019-11-15 DIAGNOSIS — R05.9 COUGHING: ICD-10-CM

## 2019-11-15 DIAGNOSIS — R11.10 DRY HEAVES: Primary | ICD-10-CM

## 2019-11-15 NOTE — PROGRESS NOTES
Blood sugar and triglycerides are up and glycol is up a little bit although not significantly elevated. At this point I would continue her current medicine with the increase of the Amaryl to 4 mg twice daily. Potassium is a little low so start potassium 20 mg daily.

## 2019-11-18 RX ORDER — POTASSIUM CHLORIDE 20 MEQ/1
20 TABLET, EXTENDED RELEASE ORAL DAILY
Qty: 30 TAB | Refills: 5 | Status: SHIPPED | OUTPATIENT
Start: 2019-11-18 | End: 2020-02-13 | Stop reason: ALTCHOICE

## 2019-11-18 RX ORDER — BENZONATATE 200 MG/1
CAPSULE ORAL
Qty: 30 CAP | Refills: 0 | Status: SHIPPED | OUTPATIENT
Start: 2019-11-18 | End: 2019-11-26 | Stop reason: SDUPTHER

## 2019-11-18 NOTE — TELEPHONE ENCOUNTER
RX refill request from the patient/pharmacy. Patient last seen 11- with labs, and next appt. scheduled for 02-  Requested Prescriptions     Pending Prescriptions Disp Refills    benzonatate (TESSALON) 200 mg capsule [Pharmacy Med Name: BENZONATATE 200MG] 30 Cap 0     Sig: TAKE 1 CAPSULE BY MOUTH THREE TIMES DAILY AS NEEDED FOR COUGH - SWALLOW CAPSULE WHOLE (DO NOT CRUSH)   .

## 2019-11-18 NOTE — PROGRESS NOTES
Patient informed of results. She will increase Amaryl to 4 mg twice daily and start Potassium 20 mg daily.

## 2019-11-18 NOTE — TELEPHONE ENCOUNTER
PCP: Denise Jimenez MD    Last appt: 11/13/2019  Future Appointments   Date Time Provider Gwendolyn Loving   11/25/2019  1:40 PM Daniela Flynn  [facility]   2/13/2020 10:10 AM Denise Jimenez MD [address]       Requested Prescriptions     Pending Prescriptions Disp Refills    potassium chloride (K-DUR, KLOR-CON) 20 mEq tablet 30 Tab 5     Sig: Take 1 Tab by mouth daily.        Prior labs and Blood pressures:  BP Readings from Last 3 Encounters:   11/13/19 120/80   09/10/19 149/89   08/09/19 130/86     Lab Results   Component Value Date/Time    Sodium 140 11/13/2019 12:38 PM    Potassium 3.2 (L) 11/13/2019 12:38 PM    Chloride 93 (L) 11/13/2019 12:38 PM    CO2 33.0 (H) 11/13/2019 12:38 PM    Anion gap 14 11/13/2019 12:38 PM    Glucose 233 (H) 11/13/2019 12:38 PM    BUN 32.0 (H) 11/13/2019 12:38 PM    Creatinine 1.1 11/13/2019 12:38 PM    BUN/Creatinine ratio 29 11/13/2019 12:38 PM    GFR est AA 59 11/13/2019 12:38 PM    GFR est non-AA 49 11/13/2019 12:38 PM    Calcium 10.8 (H) 11/13/2019 12:38 PM     Lab Results   Component Value Date/Time    Hemoglobin A1c 7.3 (H) 11/13/2019 12:37 PM    Hemoglobin A1c (POC) 7.1 (A) 04/09/2018 08:55 AM     Lab Results   Component Value Date/Time    Cholesterol, total 163 11/13/2019 12:38 PM    Cholesterol (POC) 218.0 (A) 04/09/2018 08:55 AM    HDL Cholesterol 39 11/13/2019 12:38 PM    HDL Cholesterol (POC) 56.0 04/09/2018 08:55 AM    LDL Cholesterol (POC) 119.4 04/09/2018 08:55 AM    LDL, calculated 80 11/13/2019 12:38 PM    VLDL, calculated 48 (H) 01/14/2019 08:56 AM    VLDL 44 11/13/2019 12:38 PM    Triglyceride 219 (H) 11/13/2019 12:38 PM    Triglycerides (POC) 213.0 (A) 04/09/2018 08:55 AM    CHOL/HDL Ratio 4 11/13/2019 12:38 PM     Lab Results   Component Value Date/Time    VITAMIN D, 25-HYDROXY 21.0 (L) 01/14/2019 08:56 AM       Lab Results   Component Value Date/Time    TSH 3.130 01/14/2019 08:56 AM    TSH, 3rd generation 1.70 11/13/2019 12:37 PM

## 2019-11-25 ENCOUNTER — OFFICE VISIT (OUTPATIENT)
Dept: NEUROLOGY | Age: 74
End: 2019-11-25

## 2019-11-25 VITALS
OXYGEN SATURATION: 97 % | WEIGHT: 192 LBS | HEIGHT: 64 IN | HEART RATE: 109 BPM | DIASTOLIC BLOOD PRESSURE: 85 MMHG | SYSTOLIC BLOOD PRESSURE: 182 MMHG | BODY MASS INDEX: 32.78 KG/M2

## 2019-11-25 DIAGNOSIS — M47.816 LUMBAR SPONDYLOSIS: ICD-10-CM

## 2019-11-25 DIAGNOSIS — R26.81 GAIT INSTABILITY: ICD-10-CM

## 2019-11-25 DIAGNOSIS — N32.89 BLADDER SPASM: ICD-10-CM

## 2019-11-25 DIAGNOSIS — M47.12 CERVICAL SPONDYLOSIS WITH MYELOPATHY: Primary | ICD-10-CM

## 2019-11-25 RX ORDER — CYCLOBENZAPRINE HCL 10 MG
TABLET ORAL
COMMUNITY
End: 2019-11-25 | Stop reason: SDUPTHER

## 2019-11-25 RX ORDER — PANTOPRAZOLE SODIUM 40 MG/1
40 TABLET, DELAYED RELEASE ORAL DAILY
COMMUNITY
Start: 2019-11-21

## 2019-11-25 RX ORDER — PREDNISONE 20 MG/1
TABLET ORAL
COMMUNITY
End: 2020-02-13 | Stop reason: ALTCHOICE

## 2019-11-25 RX ORDER — BACLOFEN 10 MG/1
20 TABLET ORAL 3 TIMES DAILY
Qty: 180 TAB | Refills: 3 | Status: SHIPPED | OUTPATIENT
Start: 2019-11-25 | End: 2020-02-13 | Stop reason: ALTCHOICE

## 2019-11-25 NOTE — PATIENT INSTRUCTIONS
Cervical Myelopathy: Care Instructions Your Care Instructions Cervical myelopathy is a problem caused by pressure on your spinal cord. This pressure may come from a bone spur. Or it may come from a herniated disc. In some cases, aging makes the discs and ligaments around the spine thicker. This can also put pressure on the spinal cord. Cervical myelopathy can cause different symptoms. It may cause numbness and pain. It may also cause weakness in the arms, hands, and legs. For some people, it's hard to walk. Others have neck pain or problems with urination and bowel movements. Treatment depends on the cause and your symptoms. You may need regular checkups or you may need surgery to help take pressure off the nerve. Follow-up care is a key part of your treatment and safety. Be sure to make and go to all appointments, and call your doctor if you are having problems. It's also a good idea to know your test results and keep a list of the medicines you take. How can you care for yourself at home? · Be safe with medicines. Read and follow all instructions on the label. ? If the doctor gave you a prescription medicine for pain, take it as prescribed. ? If you are not taking a prescription pain medicine, ask your doctor if you can take an over-the-counter medicine. · Do exercises recommended by your doctor or physical therapist. Strong muscles can help you do everyday tasks. · If you have trouble using your hands, put Velcro or snaps on clothes. This can make your clothes easier to get on and off. · If you have trouble walking, you can make life easier and safer. Use rails and nonskid tape at home. Try a cane or walker to get around. · Do not smoke. Smoking can slow bone healing. If you need help quitting, talk to your doctor about stop-smoking programs and medicines. These can increase your chances of quitting for good. · Pace yourself. Everyday activities may take longer than before.  Give yourself more time to get things done. This may lower your stress. When should you call for help? Call 911 anytime you think you may need emergency care. For example, call if: 
  · You are unable to move an arm or a leg at all.  
[facility] your doctor now or seek immediate medical care if: 
  · You have new or worse symptoms in your arms, legs, belly, or buttocks. Symptoms may include: 
? Numbness or tingling. ? Weakness. ? Pain.  
  · You lose bladder or bowel control.  
 Watch closely for changes in your health, and be sure to contact your doctor if: 
  · You do not get better as expected. Where can you learn more? Go to http://malia-lincoln.info/. Enter P452 in the search box to learn more about \"Cervical Myelopathy: Care Instructions. \" Current as of: June 26, 2019 Content Version: 12.2 © 8563-9554 [employer]. Care instructions adapted under license by [employer] (which disclaims liability or warranty for this information). If you have questions about a medical condition or this instruction, always ask your healthcare professional. [place] [address] any warranty or liability for your use of this information. Low Back Arthritis: Exercises Introduction Here are some examples of typical rehabilitation exercises for your condition. Start each exercise slowly. Ease off the exercise if you start to have pain. Your doctor or physical therapist will tell you when you can start these exercises and which ones will work best for you. When you are not being active, find a comfortable position for rest. Some people are comfortable on the floor or a medium-firm bed with a small pillow under their head and another under their knees. Some people prefer to lie on their side with a pillow between their knees. Don't stay in one position for too long. Take short walks (10 to 20 minutes) every 2 to 3 hours.  Avoid slopes, hills, and stairs until you feel better. Walk only distances you can manage without pain, especially leg pain. How to do the exercises Pelvic tilt 1. Lie on your back with your knees bent. 2. \"Brace\" your stomachtighten your muscles by pulling in and imagining your belly button moving toward your spine. 3. Press your lower back into the floor. You should feel your hips and pelvis rock back. 4. Hold for 6 seconds while breathing smoothly. 5. Relax and allow your pelvis and hips to rock forward. 6. Repeat 8 to 12 times. Back stretches 1. Get down on your hands and knees on the floor. 2. Relax your head and allow it to droop. Round your back up toward the ceiling until you feel a nice stretch in your upper, middle, and lower back. Hold this stretch for as long as it feels comfortable, or about 15 to 30 seconds. 3. Return to the starting position with a flat back while you are on your hands and knees. 4. Let your back sway by pressing your stomach toward the floor. Lift your buttocks toward the ceiling. 5. Hold this position for 15 to 30 seconds. 6. Repeat 2 to 4 times. Follow-up care is a key part of your treatment and safety. Be sure to make and go to all appointments, and call your doctor if you are having problems. It's also a good idea to know your test results and keep a list of the medicines you take. Where can you learn more? Go to http://malia-lincoln.info/. Enter Z881 in the search box to learn more about \"Low Back Arthritis: Exercises. \" Current as of: June 26, 2019 Content Version: 12.2 © 3980-7579 [employer]. Care instructions adapted under license by [employer] (which disclaims liability or warranty for this information). If you have questions about a medical condition or this instruction, always ask your healthcare professional. [address] any warranty or liability for your use of this information.

## 2019-11-25 NOTE — LETTER
11/25/19 Patient: Korina Pelaez YOB: 1945 Date of Visit: 11/25/2019 Kenny Durand MD 
[facility] [address] [id number] VIA In Basket Dear Kenny Durand MD, Thank you for referring Ms. Marco A Lanier to [address] [place] [address] for evaluation. My notes for this consultation are attached. If you have questions, please do not hesitate to call me. I look forward to following your patient along with you. Sincerely, Adrian Schwartz MD

## 2019-11-25 NOTE — PROGRESS NOTES
NEUROLOGY CLINIC NOTE    Patient ID:  Cecy De León  [id number]  57 y.o.  1945    Date of Visit:  November 25, 2019    Referring Physician: Dr. Courtney Hollins    Reason for Visit:  Gait instability, weakness, falls    Chief Complaint   Patient presents with    Follow-up     muscle spasm, gait leg weakness, falls       History of Present Illness:     Patient Active Problem List    Diagnosis Date Noted    Other fatigue 11/13/2019    Gait instability 11/13/2019    Non-recurrent acute suppurative otitis media of left ear without spontaneous rupture of tympanic membrane 09/10/2019    Impacted cerumen of right ear 09/10/2019    Acute bronchitis 04/30/2019    Interstitial lung disease ([address] 04/09/2019    Cervical stenosis of spine 03/26/2019    Spinal stenosis, cervical region 03/04/2019    Dyspnea on exertion 02/19/2019    Spinal stenosis, lumbar 07/16/2018    Lumbar disc disease 07/11/2018    Sleep disturbance 04/09/2018    Primary osteoarthritis involving multiple joints 01/12/2018    Recurrent depression ([address] 01/05/2018    Vitamin D deficiency 10/02/2017    GI bleed 08/24/2017    Overactive bladder 08/24/2017    Polymyalgia rheumatica ([address] 08/24/2017    IBS (irritable bowel syndrome) 08/24/2017    Hypertension with renal disease 08/24/2017    CKD (chronic kidney disease), stage II 08/24/2017    Avascular necrosis of hip ([address] 08/24/2017    Abnormal LFTs 08/24/2017    Controlled type 2 diabetes mellitus with stage 2 chronic kidney disease, with long-term current use of insulin ([address] 04/20/2015    Non-seasonal allergic rhinitis 04/20/2015    Class 1 obesity due to excess calories without serious comorbidity with body mass index (BMI) of 33.0 to 33.9 in adult 04/20/2015    Rosacea 04/20/2015    Mixed hyperlipidemia 04/20/2015    Anxiety 04/20/2015     Past Medical History:   Diagnosis Date    Abnormal LFTs 08/24/2017    FATTY LIVER    Arthritis     OSTEO    Avascular necrosis of hip ([facility] 08/24/2017    BILAT HIPS    Breast CA ([facility] 1989, 2001    BILATERAL; SURGERY, CHEMO    Chronic pain     CKD (chronic kidney disease), stage II 8/24/2017    Coagulation disorder ([facility] 1984    ITP  (DR CHU BRADSHAW) - PLATELETS DROPPED TO 5K    Depression     Diabetes ([facility]     TYPE 2; NIDDM    DJD (degenerative joint disease), multiple sites 8/24/2017    GI bleed 2008    HEMORRHOIDS    Hyperlipemia     Hypertension with renal disease 8/24/2017    IBS (irritable bowel syndrome) 8/24/2017    Ill-defined condition 2015    PNEUMONIA X2 - HOSPITALIZED IN 2015    Interstitial lung disease ([facility] 04/01/2019    Liver disease     FATTY LIVER    Myalgia 8/24/2017    On statin therapy 8/24/2017    Overactive bladder 8/24/2017    Pneumonia 04/2015    HOSPITALIZED 3 WEEKS.  Polymyalgia rheumatica ([address] 8/24/2017    Prophylactic antibiotic 8/24/2017    Reflex abnormality     acid reflex    Rosacea       Past Surgical History:   Procedure Laterality Date    BREAST SURGERY PROCEDURE UNLISTED  1993, 2002    RECONSTRUCTION X2    HX APPENDECTOMY  1950    HX BREAST BIOPSY Bilateral     HX CATARACT REMOVAL Bilateral     HX COLONOSCOPY      HX ENDOSCOPY      HX GI      COLONOSCOPY    HX HEENT      WISDOM TEETH    HX HYSTERECTOMY  2000S    HX MASTECTOMY Right 1989    HX MASTECTOMY Left 2001    HX ORTHOPAEDIC  2008    LUMBAR FUSION    HX ORTHOPAEDIC  2018    RE-DO LUMBAR FUSION, AND ADDED THORACIC FUSION    HX ORTHOPAEDIC  03/26/2019    neckectomy    HX TUBAL LIGATION  1981    HX UROLOGICAL  2000s    BLADDER SLING    TOTAL HIP ARTHROPLASTY Left 11/16/2016    ANTERIOR APPROACH, DR Gwendolyn De La Torre; (POSTOP: STANDS ONE INCH TALLER ON LEFT FOOT)    TOTAL HIP ARTHROPLASTY Right 12/2016    ANTERIOR APPROACH (DR Gwendolyn De La Torre)      Prior to Admission medications    Medication Sig Start Date End Date Taking? Authorizing Provider   predniSONE (DELTASONE) 20 mg tablet Take  by mouth daily (with breakfast).    Yes Provider, Historical   cyclobenzaprine (FLEXERIL) 10 mg tablet Take  by mouth three (3) times daily as needed for Muscle Spasm(s). Yes Provider, Historical   benzonatate (TESSALON) 200 mg capsule TAKE 1 CAPSULE BY MOUTH THREE TIMES DAILY AS NEEDED FOR COUGH - SWALLOW CAPSULE WHOLE (DO NOT CRUSH) 11/18/19  Yes Kylah Verdugo MD   potassium chloride (K-DUR, KLOR-CON) 20 mEq tablet Take 1 Tab by mouth daily. 11/18/19  Yes Kylah Verdugo MD   clemastine 2.68 mg tab tablet clemastine 2.68 mg tablet   Yes Provider, Historical   buPROPion XL (WELLBUTRIN XL) 150 mg tablet Wellbutrin  mg 24 hr tablet, extended release   Yes Provider, Historical   metFORMIN (GLUCOPHAGE) 500 mg tablet Take  by mouth two (2) times daily (with meals). Indications: takes 1 at breakfast and 2 pills at dinner   Yes Provider, Historical   carvedilol (COREG) 6.25 mg tablet  9/21/19  Yes Provider, Historical   sertraline (ZOLOFT) 100 mg tablet TAKE 1 TABLET BY MOUTH DAILY 10/22/19  Yes Kylah Verdugo MD   baclofen (LIORESAL) 10 mg tablet Take 2 Tabs by mouth three (3) times daily. 9/25/19  Yes Chaka Franco MD   cefUROXime (CEFTIN) 250 mg tablet Take 1 Tab by mouth two (2) times a day. 9/10/19  Yes Kylah Verdugo MD   furosemide (LASIX) 40 mg tablet Take 1 Tab by mouth daily. 9/10/19  Yes Kylah Verdugo MD   mirabegron ER [facility]) 50 mg ER tablet Take 50 mg by mouth daily. Yes Provider, Historical   SITagliptin (JANUVIA) 100 mg tablet Take 1 Tab by mouth daily. 8/9/19  Yes Kylah Verdugo MD   glimepiride (AMARYL) 4 mg tablet Take 1 Tab by mouth every morning. 8/9/19  Yes Kylah Verdugo MD   montelukast sodium (SINGULAIR PO) Take  by mouth. Yes Provider, Historical   pantoprazole (PROTONIX) 20 mg tablet Take 20 mg by mouth daily. Yes Provider, Historical   multivitamin, tx-iron-ca-min (THERA-M W/ IRON) 9 mg iron-400 mcg tab tablet Take 1 Tab by mouth daily.    Yes Provider, Historical pravastatin (PRAVACHOL) 80 mg tablet Take 1 Tab by mouth nightly. 6/7/19  Yes Courtney Webb MD   [facility]azepam [facility]) 15 mg capsule TAKE 2 CAPSULES AT BEDTIME 6/7/19  Yes Courtney Webb MD   albuterol [facility] HFA) 90 mcg/actuation inhaler Take 1 Puff by inhalation every four (4) hours as needed for Wheezing or Shortness of Breath. 5/3/19  Yes Courtney Webb MD   cholecalciferol (VITAMIN D3) 1,000 unit cap Take  by mouth daily. Indications: unknown of the mg. Yes Provider, Historical   biotin-silicon diox-L-cysteine 3,000 mcg -100 mg-50 mg TbER Take 1 Tab by mouth daily. Yes Provider, Historical   glucose blood VI test strips (ACCU-CHEK CHERYL) strip Use once daily 2/11/19  Yes Courtney Webb MD   hydroCHLOROthiazide (HYDRODIURIL) 25 mg tablet TAKE 1 TABLET EVERY DAY 1/18/19  Yes Courtney Webb MD   fenofibrate nanocrystallized (TRICOR) 145 mg tablet Take 1 Tab by mouth daily. 1/15/19  Yes Courtney Webb MD   multivitamin (ONE A DAY) tablet Take 1 Tab by mouth daily. Yes Provider, Historical   azelaic acid (FINACEA) 15 % topical gel Apply  to affected area two (2) times a day. Yes Provider, Historical   clindamycin (CLINDAGEL) 1 % topical gel Apply  to affected area two (2) times a day.  use thin film on affected area   Yes Provider, Historical     Allergies   Allergen Reactions    Metformin Diarrhea    Statins-Hmg-Coa Reductase Inhibitors Myalgia     Myalgia with  multiple statins  Takes pravachol     Codeine Rash     Rash on thighs    Darvon [Propoxyphene] Other (comments)     \"I see worms\"    Pcn [Penicillins] Rash    Sulfa (Sulfonamide Antibiotics) Rash      Social History     Tobacco Use    Smoking status: Never Smoker    Smokeless tobacco: Never Used   Substance Use Topics    Alcohol use: No      Family History   Problem Relation Age of Onset    Heart Disease Mother     Kidney Disease Mother     Lung Disease Mother         COPD    Diabetes Brother     Pacemaker Brother     Kidney Disease Brother     Hypertension Brother     Anesth Problems Neg Hx         Subjective:      Tenzin Lock is a 69 y.o. UTRF with history of cervical spinal stenosis status post surgery, chronic kidney disease, coagulation disorder, depression, diabetes, hyperlipidemia vascular necrosis bilateral hips, polymyalgia rheumatica, hypertension, interstitial lung disease and IBS who was referred here by Dr. Angelique Bailon for further evaluation of her gait issues. Per patient condition started around December 2018. Patient started having problems with walking. Feels wobbly and stiff. Cervical MRI without contrast done 2/21/2019 revealed straightening of the cervical spine. Grade 1 anterolisthesis of C7 on T1 measuring 3 mm. Grade 1 anterolisthesis of T1 on T2 measuring 3 mm. Diffusely heterogeneous marrow signal without a suspicious focal osseous lesion. C1-C2, there is degenerative changes at the atlantodental joint, with a small cyst at the posterior aspect of the left dens measuring 6 x 3 mm, likely representing a synovial cyst. C2-C3, severe left and mild right facet arthropathy. C3-C4, diffuse disc osteophyte complex with bilateral uncovertebral spurring, right worse than left. Severe bilateral facet arthropathy. Severe right and mild left neural foraminal stenosis. C4-C5, diffuse disc osteophyte complex with bilateral uncovertebral spurring. Severe bilateral facet arthropathy. Mild bilateral neural foraminal stenosis. C5-C6, diffuse disc osteophyte complex with bilateral uncovertebral spurring, right worse than left. Superimposed small central disc extrusion with inferior migration. Severe bilateral facet arthropathy. Moderate spinal canal stenosis with indentation of the ventral cord. Severe bilateral neural foraminal stenosis. C6-C7, diffuse disc osteophyte complex with bilateral uncovertebral spurring. Severe bilateral facet arthropathy. Mild spinal canal stenosis.  Severe bilateral neural foraminal stenosis. C7-T1, grade 1 anterolisthesis with uncovering of the disc. Severe bilateral facet arthropathy. Mild spinal canal stenosis. Severe bilateral neural foraminal stenosis. At T2-T3, there is a diffuse disc bulge which results in spinal canal stenosis with indentation of the ventral cord. Severe facet arthropathy is also noted at T2-T3. Patient was seen by orthopedics and underwent cervical surgery 3/26/2019. No benefit with her walking issues. Denies any weakness or loss of muscle bulk. Denies any falls. Better with a cane or walker. She is undergoing PT. She also mentions numbness and tingling left foot. She has had prior lumbar surgeries x 2 (last was 7/2018). Patient was seen by her orthopedic doctor last 5/16/2019. Note mentions patient is 7 weeks post operation. Note mentions it from a cervical spine standpoint she is doing well. No neck pain. Patient is not taking any medication for pain relief. She is working with home health PT and OT. Note mentions some slight improvement was still having balance issues. Also breathing seems to be worse. Plan was to continue using soft collar and home PT and OT. Patient was also referred to neurology and rheumatology regarding her balance issues. Patient was last seen by her PCP 6/11/2019. Note mentions issues with diabetic control. It also mentions patient was recently on prednisone for interstitial lung disease. BMP done revealed elevated glucose at 183. Last hemoglobin A1c done 4/16/2019 was elevated at 7.4. Patient underwent EMG/NCS of bilateral lower extremity 6/21/2019 and it was normal.  No evidence of generalized large fiber neuropathy, myopathy or significant residual lumbar radiculopathy. Head CT without contrast done 9/13/2019 to assess for any central cause of her gait instability was unremarkable. No acute process.      Since the last visit on 9/25/2019, patient did increase her Baclofen to 20 mg TID. She now reports issues with feeling weak and episodes of falling. It is offering benefit with improvement of her walking and reduction in the stiffness and episodes of muscle spasm. She still reports episodic bilateral rib below the breast muscle spasm with certain movements. She is stable with a cane. She continues to be under the care of urology and plan to do Botox and is asking me if it is okay. She is also inquiring about taking both cyclobenzaprine and Baclofen. She is also being evaluated by her PCP for issues swallowing. Labs done by PCP 11/13/2019 revealed unremarkable CBC, TSH, T4, CK and lipid panel except for elevated triglycerides. Elevated hemoglobin A1c at 7.3, low potassium at 3.2, increased calcium at 10.8, increased AST at 50 and increased sugar at 233. Outside reports reviewed: labs    Review of Systems:    A comprehensive review of systems was performed:   Positive for fatigue, blurry vision, sinus problems, shortness of breath, increased urinary frequency, incontinence, muscle aches, weakness, back pain, balance issues, falls, easy bruising    Objective:     Visit Vitals  /85   Pulse (!) 109   Ht 5' 4\" (1.626 m)   Wt 192 lb (87.1 kg)   SpO2 97%   BMI 32.96 kg/m²       PHYSICAL EXAM:    NEUROLOGICAL EXAM:    Appearance: The patient is obese, provides a coherent history and is in no acute distress. Mental Status: Oriented to time, place and person. Fluent, no aphasia or dysarthria. Mood and affect appropriate. Cranial Nerves:   II - XII were intact. Motor:  5/5 strength. Normal bulk and tone. No fasciculations. No pronator drift. Reflexes:   Deep tendon reflexes were symmetrical.    Sensory:   Normal to cold, pinprick and vibration except decrease vibration toes. Gait:  Less spastic and scissoring gait. No Romberg. Problems with tandem walking. Tremor:   No tremor noted. Cerebellar:  Intact FTN/PARMINDER/HTS.        Assessment:   Cervical spondylosis and myelopathy  Lumbar spondylosis  Gait instability  Bladder spasm    Plan:   Neurological examination is unchanged. It reveals less spastic and scissoring gait. Findings typically seen in upper motor neuron conditions spinal stenosis with myelopathy. Head CT without contrast was negative. Cervical MRI prior to surgery was reviewed. Reveals significant multilevel spondylosis with neuroforaminal narrowings and spinal stenosis worse at C5-6 and T2-3. Status post surgery. Likely dealing with residual changes from the myelopathy especially given normal LE EMG/NCS. Falls and weakness likely side effect of increase dose of Baclofen. Advise to stop cyclobenzaprine. Lower daytime dose of baclofen to 10 mg BID and continue nighttime dose of 20 mg. Prescriptions provided. Patient also with history of lumbar spondylosis status post surgeries. This may be contributing to her gait issues as well. EMG/NCS of bilateral lower extremity was normal and did not reveal any significant residual lumbar radiculopathy. Issue with gait is multifactorial. Referred to physical therapy to help with balance and strengthening. Encouraged to start aquatic exercises. Fall precautions. No contraindication from a neurological standpoint to undergo Botox treatment for his bladder issues. All questions and concerns were answered. This note was created using voice recognition software. Despite editing, there may be syntax errors.

## 2019-11-26 ENCOUNTER — OFFICE VISIT (OUTPATIENT)
Dept: INTERNAL MEDICINE CLINIC | Age: 74
End: 2019-11-26

## 2019-11-26 VITALS
OXYGEN SATURATION: 99 % | HEART RATE: 85 BPM | TEMPERATURE: 98 F | HEIGHT: 64 IN | DIASTOLIC BLOOD PRESSURE: 82 MMHG | BODY MASS INDEX: 33.73 KG/M2 | SYSTOLIC BLOOD PRESSURE: 124 MMHG | RESPIRATION RATE: 19 BRPM | WEIGHT: 197.6 LBS

## 2019-11-26 DIAGNOSIS — R05.9 COUGH: ICD-10-CM

## 2019-11-26 DIAGNOSIS — N32.81 OVERACTIVE BLADDER: ICD-10-CM

## 2019-11-26 DIAGNOSIS — K14.0 GLOSSITIS: Primary | ICD-10-CM

## 2019-11-26 RX ORDER — BENZONATATE 200 MG/1
CAPSULE ORAL
Qty: 60 CAP | Refills: 2 | Status: SHIPPED | OUTPATIENT
Start: 2019-11-26 | End: 2020-02-13 | Stop reason: ALTCHOICE

## 2019-11-26 RX ORDER — NYSTATIN 100000 [USP'U]/ML
1 SUSPENSION ORAL 4 TIMES DAILY
Qty: 200 ML | Refills: 0 | Status: SHIPPED | OUTPATIENT
Start: 2019-11-26 | End: 2020-02-13 | Stop reason: ALTCHOICE

## 2019-11-26 NOTE — PROGRESS NOTES
Subjective:   Lorenza Cason is a 69 y.o. female      Chief Complaint   Patient presents with    Mouth Pain     pt states her tongue has cracks        History of present illness: She presents today with 2 issues one is a dry sore cracked tongue that seems to become more symptomatic. She did have some problems with increased respiratory distress and went back on prednisone to 20 mg a day and that has helped her breathing although she still has a cough and the Tessalon works she would like a refill on that for the higher dose refill. She currently denies any chest pain, shortness of breath, palpitations, PND, orthopnea or other cardiorespiratory complaints except for the cough. She denies any GI or  complaints. She has been taking the baclofen for muscle spasms which is caused a dry mouth and she was previously on Ditropan which she has now weaned off of and has noted no difference in her bladder function.     Patient Active Problem List   Diagnosis Code    Controlled type 2 diabetes mellitus with stage 2 chronic kidney disease, with long-term current use of insulin ([facility] E11.22, N18.2, Z79.4    Non-seasonal allergic rhinitis J30.89    Class 1 obesity due to excess calories without serious comorbidity with body mass index (BMI) of 33.0 to 33.9 in adult E66.09, Z68.33    Rosacea L71.9    Mixed hyperlipidemia E78.2    Anxiety F41.9    GI bleed K92.2    Overactive bladder N32.81    Polymyalgia rheumatica (HCC) M35.3    IBS (irritable bowel syndrome) K58.9    Hypertension with renal disease I12.9    CKD (chronic kidney disease), stage II N18.2    Avascular necrosis of hip ([facility]) M87.059    Abnormal LFTs R94.5    Vitamin D deficiency E55.9    Recurrent depression ([facility]) F33.9    Primary osteoarthritis involving multiple joints M15.0    Sleep disturbance G47.9    Lumbar disc disease M51.9    Spinal stenosis, lumbar M48.061    Dyspnea on exertion R06.09    Spinal stenosis, cervical region M48.02    Cervical stenosis of spine M48.02    Interstitial lung disease (HCC) J84.9    Acute bronchitis J20.9    Non-recurrent acute suppurative otitis media of left ear without spontaneous rupture of tympanic membrane H66.002    Impacted cerumen of right ear H61.21    Other fatigue R53.83    Gait instability R26.81    Glossitis K14.0    Cough R05      Past Medical History:   Diagnosis Date    Abnormal LFTs 08/24/2017    FATTY LIVER    Arthritis     OSTEO    Avascular necrosis of hip ([facility] 08/24/2017    BILAT HIPS    Breast CA ([address] 1989, 2001    BILATERAL; SURGERY, CHEMO    Chronic pain     CKD (chronic kidney disease), stage II 8/24/2017    Coagulation disorder ([facility] 1984    ITP  (DR CHU BRADSHAW) - PLATELETS DROPPED TO 5K    Depression     Diabetes ([facility]     TYPE 2; NIDDM    DJD (degenerative joint disease), multiple sites 8/24/2017    GI bleed 2008    HEMORRHOIDS    Hyperlipemia     Hypertension with renal disease 8/24/2017    IBS (irritable bowel syndrome) 8/24/2017    Ill-defined condition 2015    PNEUMONIA X2 - HOSPITALIZED IN 2015    Interstitial lung disease ([facility] 04/01/2019    Liver disease     FATTY LIVER    Myalgia 8/24/2017    On statin therapy 8/24/2017    Overactive bladder 8/24/2017    Pneumonia 04/2015    HOSPITALIZED 3 WEEKS.     Polymyalgia rheumatica (HCC) 8/24/2017    Prophylactic antibiotic 8/24/2017    Reflex abnormality     acid reflex    Rosacea       Allergies   Allergen Reactions    Metformin Diarrhea    Statins-Hmg-Coa Reductase Inhibitors Myalgia     Myalgia with  multiple statins  Takes pravachol     Codeine Rash     Rash on thighs    Darvon [Propoxyphene] Other (comments)     \"I see worms\"    Pcn [Penicillins] Rash    Sulfa (Sulfonamide Antibiotics) Rash      Family History   Problem Relation Age of Onset    Heart Disease Mother     Kidney Disease Mother     Lung Disease Mother         COPD    Diabetes Brother     Pacemaker Brother     Kidney Disease Brother     Hypertension Brother     Anesth Problems Neg Hx       Social History     Socioeconomic History    Marital status:      Spouse name: Not on file    Number of children: Not on file    Years of education: Not on file    Highest education level: Not on file   Occupational History    Not on file   Social Needs    Financial resource strain: Not on file    Food insecurity:     Worry: Not on file     Inability: Not on file    Transportation needs:     Medical: Not on file     Non-medical: Not on file   Tobacco Use    Smoking status: Never Smoker    Smokeless tobacco: Never Used   Substance and Sexual Activity    Alcohol use: No    Drug use: No    Sexual activity: Never   Lifestyle    Physical activity:     Days per week: Not on file     Minutes per session: Not on file    Stress: Not on file   Relationships    Social connections:     Talks on phone: Not on file     Gets together: Not on file     Attends Shinto service: Not on file     Active member of club or organization: Not on file     Attends meetings of clubs or organizations: Not on file     Relationship status: Not on file    Intimate partner violence:     Fear of current or ex partner: Not on file     Emotionally abused: Not on file     Physically abused: Not on file     Forced sexual activity: Not on file   Other Topics Concern    Not on file   Social History Narrative    Not on file     Prior to Admission medications    Medication Sig Start Date End Date Taking? Authorizing Provider   benzonatate (TESSALON) 200 mg capsule TAKE 1 CAPSULE BY MOUTH THREE TIMES DAILY AS NEEDED FOR COUGH - SWALLOW CAPSULE WHOLE (DO NOT CRUSH) 11/26/19  Yes Chitra Baca MD   nystatin (MYCOSTATIN) 100,000 unit/mL suspension Take 5 mL by mouth four (4) times daily. swish and spit 11/26/19  Yes Chitra Baca MD   predniSONE (DELTASONE) 20 mg tablet Take  by mouth daily (with breakfast).    Yes Provider, Historical   pantoprazole (PROTONIX) 40 mg tablet  11/21/19  Yes Provider, Historical   baclofen (LIORESAL) 10 mg tablet Take 2 Tabs by mouth three (3) times daily. Patient taking differently: Take 20 mg by mouth three (3) times daily. Indications: pt is taking 1 pill in the morning, 2 at lunch and 2 at dinner 11/25/19  Yes Sinai Love MD   potassium chloride (K-DUR, KLOR-CON) 20 mEq tablet Take 1 Tab by mouth daily. 11/18/19  Yes Marisol Harmon MD   clemastine 2.68 mg tab tablet clemastine 2.68 mg tablet   Yes Provider, Historical   buPROPion XL (WELLBUTRIN XL) 150 mg tablet Wellbutrin  mg 24 hr tablet, extended release   Yes Provider, Historical   metFORMIN (GLUCOPHAGE) 500 mg tablet Take  by mouth two (2) times daily (with meals). Indications: takes 1 at breakfast and 2 pills at dinner   Yes Provider, Historical   carvedilol (COREG) 6.25 mg tablet  9/21/19  Yes Provider, Historical   sertraline (ZOLOFT) 100 mg tablet TAKE 1 TABLET BY MOUTH DAILY 10/22/19  Yes Marisol Harmon MD   furosemide (LASIX) 40 mg tablet Take 1 Tab by mouth daily. 9/10/19  Yes Marisol Harmon MD   SITagliptin (JANUVIA) 100 mg tablet Take 1 Tab by mouth daily. 8/9/19  Yes Marisol Harmon MD   glimepiride (AMARYL) 4 mg tablet Take 1 Tab by mouth every morning. 8/9/19  Yes Marisol Harmon MD   montelukast sodium (SINGULAIR PO) Take  by mouth. Yes Provider, Historical   multivitamin, tx-iron-ca-min (THERA-M W/ IRON) 9 mg iron-400 mcg tab tablet Take 1 Tab by mouth daily. Yes Provider, Historical   pravastatin (PRAVACHOL) 80 mg tablet Take 1 Tab by mouth nightly. 6/7/19  Yes Marisol Harmon MD   oxazepam [facility]) 15 mg capsule TAKE 2 CAPSULES AT BEDTIME 6/7/19  Yes Marisol Harmon MD   albuterol [facility] HFA) 90 mcg/actuation inhaler Take 1 Puff by inhalation every four (4) hours as needed for Wheezing or Shortness of Breath.  5/3/19  Yes Marisol Harmon MD   cholecalciferol (VITAMIN D3) 1,000 unit cap Take  by mouth daily. Indications: unknown of the mg. Yes Provider, Historical   biotin-silicon diox-L-cysteine 3,000 mcg -100 mg-50 mg TbER Take 1 Tab by mouth daily. Yes Provider, Historical   glucose blood VI test strips (ACCU-CHEK CHERYL) strip Use once daily 2/11/19  Yes Jj Santos MD   hydroCHLOROthiazide (HYDRODIURIL) 25 mg tablet TAKE 1 TABLET EVERY DAY 1/18/19  Yes Jj Santos MD   fenofibrate nanocrystallized (TRICOR) 145 mg tablet Take 1 Tab by mouth daily. 1/15/19  Yes Jj Santos MD   multivitamin (ONE A DAY) tablet Take 1 Tab by mouth daily. Yes Provider, Historical   azelaic acid (FINACEA) 15 % topical gel Apply  to affected area two (2) times a day. Yes Provider, Historical   clindamycin (CLINDAGEL) 1 % topical gel Apply  to affected area two (2) times a day. use thin film on affected area   Yes Provider, Historical        Review of Systems              Constitutional:  She denies fever, weight loss, sweats or fatigue. EYES: No blurred or double vision,               ENT: no nasal congestion, no headache or dizziness. No difficulty with  swallowing, taste, speech or smell. Positive for dry mouth and cracked tongue  Respiratory: Positive for nonproductive cough without wheezing or shortness of breath. No sputum production. Cardiac:  Denies chest pain, palpitations, unexplained indigestion, syncope, edema, PND or orthopnea. GI:  No changes in bowel movements, no abdominal pain, no bloating, anorexia, nausea, vomiting or heartburn. :  No frequency or dysuria. Denies incontinence or sexual dysfunction. Extremities:  No joint pain, stiffness or swelling  Back:.no pain or soreness  Skin:  No recent rashes or mole changes. Neurological:  No numbness, tingling, burning paresthesias or loss of motor strength. No syncope, dizziness, frequent headaches or memory loss.   Hematologic:  No easy bruising  Lymphatic: No lymph node enlargement    Objective:     Vitals: 11/26/19 0941 11/26/19 1009   BP: (!) 150/98 124/82   Pulse: 85    Resp: 19    Temp: 98 °F (36.7 °C)    TempSrc: Oral    SpO2: 99%    Weight: 197 lb 9.6 oz (89.6 kg)    Height: 5' 4\" (1.626 m)    PainSc:   2    PainLoc: Back        Body mass index is 33.92 kg/m². Physical Examination:              General Appearance:  Well-developed, well-nourished, no acute distress. HEENT:      Ears:  The TMs and ear canals were clear. Eyes:  The pupillary responses were normal.  Extraocular muscle function intact. Lids and conjunctiva not injected. Funduscopic exam revealed sharp disc margins. Nares: Clear w/o edema or erythema  Pharynx:  Clear with teeth in good repair. No masses were noted. Neck:  Supple without thyromegaly or adenopathy. No JVD noted. No carotid                bruits. Lungs:  Clear to auscultation and percussion. Cardiac:  Regular rate and rhythm without murmur. PMI not displaced. No gallop, rub or click. Abdominal: Soft, non-tender, no hepata-spleenomegally or masses  Extremities:  No clubbing, cyanosis or edema. Skin:  No rash or unusual mole changes noted. Lymph Nodes:  None felt in the cervical, supraclavicular, axillary or inguinal region. Neurological: . DTRs 2+ and symmetric. Station and gait normal.   Hematologic:   No purpura or petechiae        Assessment/Plan:         1. Glossitis    2. Overactive bladder    3. Cough        Impressions/Plan:  Impression  1. Glossitis we will try some nystatin swish and swallow and see if that will help and have asked her to stop the baclofen which is probably playing a role in this  2. Overactive bladder currently off of medicine and not seem to make a difference so continue off medicine  3 cough I think this is related to her interstitial lung disease so we will continue with the Tessalon which seems to work  I will recheck her again as previously scheduled or sooner should to be a problem.     Orders Placed This Encounter  benzonatate (TESSALON) 200 mg capsule    nystatin (MYCOSTATIN) 100,000 unit/mL suspension           No results found for any visits on 11/26/19. Kenny Durand MD    The patient was given after the visit summary the patient verbalized an understanding of the plans and problems as explained.

## 2019-11-26 NOTE — PROGRESS NOTES
Chief Complaint   Patient presents with    Mouth Pain     pt states her tongue has cracks     Visit Vitals  BP (!) 150/98 (BP 1 Location: Left arm, BP Patient Position: Sitting)   Pulse 85   Temp 98 °F (36.7 °C) (Oral)   Resp 19   Ht 5' 4\" (1.626 m)   Wt 197 lb 9.6 oz (89.6 kg)   SpO2 99%   BMI 33.92 kg/m²     1. Have you been to the ER, urgent care clinic since your last visit? Hospitalized since your last visit? No    2. Have you seen or consulted any other health care providers outside of the [address] [place] [address] since your last visit? Include any pap smears or colon screening.  No

## 2019-11-26 NOTE — PATIENT INSTRUCTIONS
Body Mass Index: Care Instructions Your Care Instructions Body mass index (BMI) can help you see if your weight is raising your risk for health problems. It uses a formula to compare how much you weigh with how tall you are. · A BMI lower than 18.5 is considered underweight. · A BMI between 18.5 and 24.9 is considered healthy. · A BMI between 25 and 29.9 is considered overweight. A BMI of 30 or higher is considered obese. If your BMI is in the normal range, it means that you have a lower risk for weight-related health problems. If your BMI is in the overweight or obese range, you may be at increased risk for weight-related health problems, such as high blood pressure, heart disease, stroke, arthritis or joint pain, and diabetes. If your BMI is in the underweight range, you may be at increased risk for health problems such as fatigue, lower protection (immunity) against illness, muscle loss, bone loss, hair loss, and hormone problems. BMI is just one measure of your risk for weight-related health problems. You may be at higher risk for health problems if you are not active, you eat an unhealthy diet, or you drink too much alcohol or use tobacco products. Follow-up care is a key part of your treatment and safety. Be sure to make and go to all appointments, and call your doctor if you are having problems. It's also a good idea to know your test results and keep a list of the medicines you take. How can you care for yourself at home? · Practice healthy eating habits. This includes eating plenty of fruits, vegetables, whole grains, lean protein, and low-fat dairy. · If your doctor recommends it, get more exercise. Walking is a good choice. Bit by bit, increase the amount you walk every day. Try for at least 30 minutes on most days of the week. · Do not smoke. Smoking can increase your risk for health problems.  If you need help quitting, talk to your doctor about stop-smoking programs and medicines. These can increase your chances of quitting for good. · Limit alcohol to 2 drinks a day for men and 1 drink a day for women. Too much alcohol can cause health problems. If you have a BMI higher than 25 · Your doctor may do other tests to check your risk for weight-related health problems. This may include measuring the distance around your waist. A waist measurement of more than 40 inches in men or 35 inches in women can increase the risk of weight-related health problems. · Talk with your doctor about steps you can take to stay healthy or improve your health. You may need to make lifestyle changes to lose weight and stay healthy, such as changing your diet and getting regular exercise. If you have a BMI lower than 18.5 · Your doctor may do other tests to check your risk for health problems. · Talk with your doctor about steps you can take to stay healthy or improve your health. You may need to make lifestyle changes to gain or maintain weight and stay healthy, such as getting more healthy foods in your diet and doing exercises to build muscle. Where can you learn more? Go to http://malia-lincoln.info/. Enter S176 in the search box to learn more about \"Body Mass Index: Care Instructions. \" Current as of: March 28, 2019 Content Version: 12.2 © 8946-7877 [employer]. Care instructions adapted under license by [employer] (which disclaims liability or warranty for this information). If you have questions about a medical condition or this instruction, always ask your healthcare professional. [address] any warranty or liability for your use of this information.

## 2019-11-27 ENCOUNTER — DOCUMENTATION ONLY (OUTPATIENT)
Dept: NEUROLOGY | Age: 74
End: 2019-11-27

## 2019-12-09 DIAGNOSIS — R13.10 DYSPHAGIA, UNSPECIFIED TYPE: Primary | ICD-10-CM

## 2019-12-10 ENCOUNTER — HOSPITAL ENCOUNTER (OUTPATIENT)
Dept: GENERAL RADIOLOGY | Age: 74
Discharge: HOME | End: 2019-12-10
Attending: INTERNAL MEDICINE
Payer: MEDICARE

## 2019-12-10 DIAGNOSIS — R13.10 DYSPHAGIA, UNSPECIFIED TYPE: ICD-10-CM

## 2019-12-10 DIAGNOSIS — F41.9 ANXIETY: ICD-10-CM

## 2019-12-10 PROCEDURE — 74230 X-RAY XM SWLNG FUNCJ C+: CPT

## 2019-12-10 PROCEDURE — 92611 MOTION FLUOROSCOPY/SWALLOW: CPT

## 2019-12-10 RX ORDER — OXAZEPAM 15 MG/1
CAPSULE ORAL
Qty: 60 CAP | Refills: 5 | Status: SHIPPED | OUTPATIENT
Start: 2019-12-10 | End: 2019-12-27 | Stop reason: SDUPTHER

## 2019-12-10 NOTE — PROGRESS NOTES
[address] [place] [address]    Speech Pathology Modified barium swallow Study  Patient: Tracy Mar (89 y.o. female)  Date: 12/10/2019  Referring Provider:  Dr. Srikanth Lea:   The patient complains of excessive coughing and reported it is likely due to ILD. She also feels like her throat is closing off sometimes but it occurs less frequently when on Prednisone. On a recent FEES (fiberoptic endoscopic evaluation of swallowing) it was noted that she has reflux and was started on meds for control. Pt does not \"feel heartburn\" and it seems hard for her to accept that she has it. OBJECTIVE:   Past Medical History:   Past Medical History:   Diagnosis Date    Abnormal LFTs 08/24/2017    FATTY LIVER    Arthritis     OSTEO    Avascular necrosis of hip ([address] 08/24/2017    BILAT HIPS    Breast CA ([address] 1989, 2001    BILATERAL; SURGERY, CHEMO    Chronic pain     CKD (chronic kidney disease), stage II 8/24/2017    Coagulation disorder ([address] 1984    ITP  (DR CHU BRADSHAW) - PLATELETS DROPPED TO 5K    Depression     Diabetes ([address]     TYPE 2; NIDDM    DJD (degenerative joint disease), multiple sites 8/24/2017    GI bleed 2008    HEMORRHOIDS    Hyperlipemia     Hypertension with renal disease 8/24/2017    IBS (irritable bowel syndrome) 8/24/2017    Ill-defined condition 2015    PNEUMONIA X2 - HOSPITALIZED IN 2015    Interstitial lung disease ([address] 04/01/2019    Liver disease     FATTY LIVER    Myalgia 8/24/2017    On statin therapy 8/24/2017    Overactive bladder 8/24/2017    Pneumonia 04/2015    HOSPITALIZED 3 WEEKS.     Polymyalgia rheumatica ([address] 8/24/2017    Prophylactic antibiotic 8/24/2017    Reflex abnormality     acid reflex    Rosacea      Past Surgical History:   Procedure Laterality Date    BREAST SURGERY PROCEDURE UNLISTED  1993, 2002    RECONSTRUCTION X2    HX APPENDECTOMY  1950    HX BREAST BIOPSY Bilateral     HX CATARACT REMOVAL Bilateral     HX COLONOSCOPY      HX ENDOSCOPY      HX GI      COLONOSCOPY    HX HEENT      WISDOM TEETH    HX HYSTERECTOMY  2000S    HX MASTECTOMY Right 1989    HX MASTECTOMY Left 2001    HX ORTHOPAEDIC  2008    LUMBAR FUSION    HX ORTHOPAEDIC  2018    RE-DO LUMBAR FUSION, AND ADDED THORACIC FUSION    HX ORTHOPAEDIC  03/26/2019    neckectomy    HX TUBAL LIGATION  1981    HX UROLOGICAL  2000s    BLADDER SLING    TOTAL HIP ARTHROPLASTY Left 11/16/2016    ANTERIOR APPROACH, DR Gwendolyn De La Torre; (POSTOP: STANDS ONE INCH TALLER ON LEFT FOOT)    TOTAL HIP ARTHROPLASTY Right 12/2016    ANTERIOR APPROACH (DR Gwendolyn De La Torre)     Current Dietary Status: regular/thins  Radiologist: Dr. Dorman Naas: Lateral;Fluoro  Patient Position: upright in transmotion chair    Trial 1:   Consistency Presented: Thin liquid; Solid;Puree;Pill/Tablet   How Presented: Cup/gulp; Successive swallows;Straw;SLP-fed/presented       Bolus Acceptance: No impairment   Bolus Formation/Control: No impairment:     Propulsion: No impairment   Oral Residue: None   Initiation of Swallow: No impairment   Timing: No impairment   Penetration: Flash/transient; Before swallow; To laryngeal vestibule(only on tsp amt)   Aspiration/Timing: No evidence of aspiration   Pharyngeal Clearance: No residue                       Decreased Tongue Base Retraction?: No  Laryngeal Elevation: WFL (within functional limits)  Aspiration/Penetration Score: 1 (No penetration or aspiration-Contrast does not enter the airway)             Oral Phase Severity: No impairment  Pharyngeal Phase Severity: N/A    ASSESSMENT :  Based on the objective data described above, the patient presents with functional swallow of all textures and amounts. She ingested thins, purees, solids and a barium tablet with no aspiration. PLAN/RECOMMENDATIONS :  No follow up recommended.         COMMUNICATION/EDUCATION:   The above findings and recommendations were discussed with: the patient who verbalized understanding.     Thank you for this referral.  Reginald Swartz, SLP  Time Calculation: 20 mins

## 2019-12-27 DIAGNOSIS — F41.9 ANXIETY: ICD-10-CM

## 2019-12-31 RX ORDER — OXAZEPAM 15 MG/1
CAPSULE ORAL
Qty: 180 CAP | Refills: 1 | Status: SHIPPED | OUTPATIENT
Start: 2019-12-31 | End: 2020-07-30 | Stop reason: SDUPTHER

## 2019-12-31 NOTE — TELEPHONE ENCOUNTER
RX refill request from the patient/pharmacy. Patient last seen 11- with labs, and next appt. scheduled for 02-  Requested Prescriptions     Pending Prescriptions Disp Refills    oxazepam (SERAX) 15 mg capsule 180 Cap 1     Sig: TAKE 2 CAPSULES BY MOUTH NIGHTLY AT BEDTIME   .

## 2020-01-15 RX ORDER — FENOFIBRATE 145 MG/1
TABLET, COATED ORAL
Qty: 30 TAB | Refills: 11 | Status: SHIPPED | OUTPATIENT
Start: 2020-01-15 | End: 2021-05-27 | Stop reason: SDUPTHER

## 2020-01-15 NOTE — TELEPHONE ENCOUNTER
RX refill request from the patient/pharmacy. Patient last seen 11- with labs, and next appt. scheduled for 02-  Requested Prescriptions     Pending Prescriptions Disp Refills    fenofibrate nanocrystallized (TRICOR) 145 mg tablet [Pharmacy Med Name: FENOFIBRATE 145MG] 30 Tab 11     Sig: TAKE 1 TABLET EVERY DAY   .

## 2020-01-17 NOTE — MR AVS SNAPSHOT
"  History     Chief Complaint:  Shortness of Breath    The history is provided by the patient and the spouse. The history is limited by the condition of the patient.      Amy Luna is a 75 year old type II diabetic female with a history of hypertension, hyperlipidemia, aortic stenosis, CHF who presents to the emergency department today for evaluation of shortness of breath. The patient has reportedly been struggling with a sinus infection for approximately the past two weeks and finished a course of antibiotics recently (unknown the name). Her  states that over the past three to four days the patient has had a runny nose and increased productive cough with a discolored sputum. Her spouse states that she had notably increased shortness of breath yesterday after she walked up the stairs, she has also had a subjective fever at home and her  adds that the \"patient's temperature tends to run low\". The patient's  is unsure of whether her not her legs are more swollen than her baseline. The patient is not chronically on oxygen but she does use a CPAP machine at night.       Previous Results, 10/13/2016  Cardiac Echocardiogram   1. Normal LV size, thickness and systolic function, EF > 55%.    2. Normal RV size and function.    3. Mild to moderate biatrial enlargement.    4. Moderate aortic stenosis is present -- see details below.    5. Mild aortic regurgitation.    6. Moderately to severely calcified mitral annulus, resulting in     moderate mitral stenosis (mean gradient 6 mmHg).    7. When compared to prior report, AS has progressed slightly.      Left Ventricular Ejection Fraction: 60 %       Allergies:  Penicillins   Pioglitazone     Medications:    Atorvastatin  Bupropion  Glipizide   Metformin XR  Sertraline  Lantus  Losartan-hydrochlorothiazide   Gabapentin   Novolin  Metoprolol succinate  Atrovent   Losartan   Furosemide     Past Medical History:    Aortic stenosis  Depression  Diabetes, " Visit Information Date & Time Provider Department Dept. Phone Encounter #  
 10/2/2017  8:00 AM [phone] [id number] Follow-up Instructions Return in about 3 months (around 1/2/2018). Upcoming Health Maintenance Date Due DTaP/Tdap/Td series (1 - Tdap) 1/15/1968 FOBT Q 1 YEAR AGE 50-75 1/15/1997 ZOSTER VACCINE AGE 60> 11/15/2006 MEDICARE YEARLY EXAM 1/15/2012 HEMOGLOBIN A1C Q6M 4/2/2018 EYE EXAM RETINAL OR DILATED Q1 5/15/2018 FOOT EXAM Q1 10/2/2018 MICROALBUMIN Q1 10/2/2018 LIPID PANEL Q1 10/2/2018 GLAUCOMA SCREENING Q2Y 5/15/2019 BREAST CANCER SCRN MAMMOGRAM 10/2/2019 Allergies as of 10/2/2017  Review Complete On: 10/2/2017 By: Dejon Regan MD  
  
 Severity Noted Reaction Type Reactions Statins-hmg-coa Reductase Inhibitors High 11/20/2016    Myalgia Myalgia with pravachol and multiple statins Codeine  04/20/2015    Rash Rash on thighs Darvon [Propoxyphene]  04/20/2015    Other (comments) \"I see worms\" Pcn [Penicillins]  04/20/2015    Rash  
 Sulfa (Sulfonamide Antibiotics)  04/20/2015    Rash Current Immunizations  Reviewed on 5/6/2015 Name Date Influenza High Dose Vaccine PF  Incomplete Influenza Vaccine 11/2/2015 Pneumococcal Conjugate (PCV-13) 7/27/2015 Pneumococcal Vaccine (Unspecified Type) 1/1/2013, 12/23/2011 Not reviewed this visit You Were Diagnosed With   
  
 Codes Comments Annual physical exam    -  Primary ICD-10-CM: Z00.00 ICD-9-CM: V70.0 Hypertension with renal disease     ICD-10-CM: I12.9 ICD-9-CM: 403.90 Mixed hyperlipidemia     ICD-10-CM: E78.2 ICD-9-CM: 272.2 Primary osteoarthritis involving multiple joints     ICD-10-CM: M15.0 ICD-9-CM: 715.09 Type 2 diabetes mellitus without complication, without long-term current use of insulin (HCC)     ICD-10-CM: E11.9 ICD-9-CM: 250.00 CKD (chronic kidney disease), stage II     ICD-10-CM: N18.2 ICD-9-CM: 426. 2 Class 1 obesity due to excess calories without serious comorbidity with body mass index (BMI) of 32.0 to 32.9 in adult     ICD-10-CM: E66.09, Z68.32 
ICD-9-CM: 278.00, V85.32 Irritable bowel syndrome, unspecified type     ICD-10-CM: K58.9 ICD-9-CM: 280.7 Non-seasonal allergic rhinitis, unspecified chronicity, unspecified trigger     ICD-10-CM: J30.89 ICD-9-CM: 477.8 Vitamin D deficiency     ICD-10-CM: E55.9 ICD-9-CM: 268.9 Effects of exposure to external cause, initial encounter     ICD-10-CM: T75.89XA ICD-9-CM: 994.9 Colon cancer screening     ICD-10-CM: Z12.11 ICD-9-CM: V76.51 Encounter for immunization     ICD-10-CM: C45 ICD-9-CM: V03.89 Vitals BP Pulse Temp Resp Height(growth percentile) Weight(growth percentile) 122/80 (BP 1 Location: Left arm, BP Patient Position: Sitting) 78 97.9 °F (36.6 °C) (Oral) 18 5' 4\" (1.626 m) 190 lb 12.8 oz (86.5 kg) SpO2 BMI OB Status Smoking Status (!) 18% 32.75 kg/m2 Hysterectomy Never Smoker BMI and BSA Data Body Mass Index Body Surface Area 32.75 kg/m 2 1.98 m 2 Preferred Pharmacy Pharmacy Name Phone [facility] [address] [address] [address] [phone] Your Updated Medication List  
  
   
This list is accurate as of: 10/2/17  9:11 AM.  Always use your most recent med list.  
  
  
  
  
 aspirin delayed-release 325 mg tablet Take 1 Tab by mouth two (2) times a day. buPROPion  mg tablet Commonly known as:  Malachy Gracieman Take 150 mg by mouth every morning. CLARITIN-D 12 HOUR 5-120 mg per tablet Generic drug:  loratadine-pseudoephedrine Take 1 Tab by mouth two (2) times a day. clindamycin 1 % topical gel Commonly known as:  CLINDAGEL Apply  to affected area two (2) times a day. use thin film on affected area type II   Hypertension   Hyperlipidemia   Obstructive sleep apnea (on CPAP)  Adenomatous polyp of colon   Atrial flutter   Heart murmur  Eating disorder  Migraines   Pleural effusion, left   Vertigo  Complete heart block     Past Surgical History:    Appendectomy   section, x 2  Total abdominal hysterectomy   Carnation teeth extraction   Sternotomy with pericardial window   Cardiac ablation   Papilloma removal, right lateral breast, benign   Trigger finger release, right long finger  Breast biopsy, x 2   Cataract extraction,  Bilateral   Pacemaker placement     Family History:    Diabetes  Coronary artery disease- Mother   Cancer- prostate  Hyperlipidemia   Congenital heart problem- Father ( of ruptured thoracic aneurysm)     Social History:  The patient was accompanied to the ED by her .  Smoking Status: Never Smoker  Smokeless Tobacco: Never Used  Alcohol Use: Negative    Drug Use: Negative   Marital Status:       Review of Systems   Constitutional: Positive for fever.   HENT: Positive for rhinorrhea.    Respiratory: Positive for cough and shortness of breath.    All other systems reviewed and are negative.      Physical Exam     Patient Vitals for the past 24 hrs:   BP Temp Temp src Pulse Heart Rate Resp SpO2 Weight   20 2345 (!) 174/69 -- -- -- 87 20 99 % --   20 2330 (!) 176/75 -- -- 85 89 22 100 % --   20 2315 (!) 179/71 -- -- -- 91 16 100 % --   20 2300 (!) 177/70 -- -- 90 90 21 100 % --   20 225 (!) 178/64 -- -- 91 92 24 100 % --   20 -- -- -- -- 93 (!) 31 100 % --   20 (!) 163/76 -- -- 95 90 19 100 % --   20 -- -- -- 96 95 19 100 % --   20 (!) 150/79 -- -- -- 98 (!) 33 100 % --   20 -- -- -- -- -- -- -- 133.7 kg (294 lb 12.1 oz)   20 (!) 164/75 -- -- -- -- -- -- --   20 -- -- -- 103 101 27 100 % --   20 (!) 164/75 -- -- -- -- -- 100 % --   20 -- -- -- 111  -- -- (!) 88 % --   01/16/20 2216 -- 96.4  F (35.8  C) Temporal -- -- -- -- --   01/16/20 2215 (!) 171/73 -- -- 109 -- -- (!) 85 % --      Physical Exam  Nursing note and vitals reviewed.  Constitutional: Well nourished. Tripoding on arrival  Eyes: Conjunctiva normal.  Pupils are equal, round, and reactive to light.   ENT: Nose normal. Mucous membranes pink and moist.    Neck: Normal range of motion.  CVS: Sinus tachycardia.  Normal heart sounds.  Systolic murmur.  Pulmonary: Lungs with wheezing bilaterally  GI: Obese. Abdomen soft. Nontender, nondistended. No rigidity or guarding.    MSK: No calf tenderness; +1 LE edema  Neuro: Alert. Follows simple commands.  Skin: Skin is warm and dry. No rash noted.   Psychiatric: Normal affect.       Emergency Department Course     ECG:  ECG taken at 2220, ECG read at 2220  Sinus tachycardia with premature atrial complexes   Left anterior fascicular block  Abnormal ECG   Rate 102 bpm. VA interval 144. QRS duration 90. QT/QTc 342/445. P-R-T axes 56 -49 54.  Left fascicular block new compared to 11/11/2014    Imaging:  Radiology findings were communicated with the patient and family who voiced understanding of the findings.  XR, Chest Port, 1 View  Upper normal heart size. Stable left chest wall pacer. Mild CHF suggested, with subtle interstitial edema, vascular congestion, and possible small left pleural effusion. No pneumothorax.  Reading per radiology    Laboratory:  Laboratory findings were communicated with the patient and family who voiced understanding of the findings.  CBC: AWNL (WBC 7.7, HGB 12.3, )  CMP: Glucose 204 (H), GFR estimate 59 (L). O/w WNL (Creatinine 0.95)    Troponin (Collected 2221): 0.020   ISTAT gases lactate ling POCT: pH Venous 7.36, PCO2 Venous 50, PO2 Venous 27, Bicarbonate Venous 28, O2 Sat Venous 47, Lactic Acid 1.7   BNP: 1,556 (H)  Influenza A/B Antigen: Negative     Glucose by Meter (Collected 2225): 202 (H)   Procalcitonin: Pending  Blood  clindamycin 300 mg capsule Commonly known as:  CLEOCIN Take 300 mg by mouth three (3) times daily. COREG 6.25 mg tablet Generic drug:  carvedilol Take 3.125 mg by mouth two (2) times daily (with meals). diclofenac EC 75 mg EC tablet Commonly known as:  VOLTAREN Take 1 Tab by mouth two (2) times a day. ELOCON 0.1 % topical cream  
Generic drug:  mometasone Apply  to affected area daily. FINACEA 15 % topical gel Generic drug:  azelaic acid Apply  to affected area two (2) times a day. GLUCOSAMINE HCL-MSM-CHONDROITN PO Take  by mouth. hydroCHLOROthiazide 25 mg tablet Commonly known as:  HYDRODIURIL  
TAKE 1 TABLET BY MOUTH DAILY  
  
 levalbuterol 1.25 mg/0.5 mL Nebu Commonly known as:  XOPENEX  
1.25 mg by Nebulization route. metFORMIN  mg tablet Commonly known as:  GLUCOPHAGE XR Take 500 mg by mouth two (2) times a day. multivitamin tablet Commonly known as:  ONE A DAY Take 1 Tab by mouth daily. mupirocin 2 % ointment Commonly known as:  [facility] Apply  to affected area daily. oxazepam 15 mg capsule Commonly known as:  SERAX TAKE 2 CAPSULES BY MOUTH EVERY NIGHT AT BEDTIME  
  
 pravastatin 40 mg tablet Commonly known as:  PRAVACHOL Take 40 mg by mouth nightly. sertraline 100 mg tablet Commonly known as:  ZOLOFT Take 100 mg by mouth daily. SHARK CARTILAGE PO Take  by mouth. VENTOLIN HFA 90 mcg/actuation inhaler Generic drug:  albuterol Take  by inhalation. VITAMIN D2 50,000 unit capsule Generic drug:  ergocalciferol Take 50,000 Units by mouth. Take 1 capsule weekly for 12 weeks. Prescriptions Sent to Pharmacy Refills  
 mupirocin (BACTROBAN) 2 % ointment 0 Sig: Apply  to affected area daily. Class: Normal  
 Pharmacy: [facility] [address] [place] [address] Ph #: [phone] Cultures x2: Pending     Interventions:  2218 DuoNeb 3 mL Nebulization    2220 Methylprednisolone 125 mg IV  2309 Lasix 40 mg IV  2344 Aspirin 324 mg PO    Emergency Department Course:  2213 Red team activation called from triage.   Nursing notes and vitals reviewed.  2214 Patient arrived in room.  2214 I performed an exam of the patient as documented above.   2215 Patient transferred to [facility] and oxygen mask placed.   2220 An ECG was performed, results above.   2221 Patient placed on BiPap.  2221 IV was inserted and blood was drawn for laboratory testing, results above.   2224 The patient had a chest x-ray while in the emergency department, results above.   2318 Patient and her  were rechecked and updated with all results and plan for admission.   2323 I spoke with Dr. Grimaldo of the hospitalist service from [place] regarding patient's presentation, findings, and plan of care.      Findings and plan explained to the Patient and spouse who consents to admission. Discussed the patient with Dr. Grimaldo, who will admit the patient to an adult ICU bed for further monitoring, evaluation, and treatment.     I personally reviewed the laboratory and imaging results with the Patient and spouse and answered all related questions prior to admission.      Impression & Plan      Medical Decision Making:  Patient is a 75-year-old female presenting with dyspnea.  She is noted to be hypoxic on arrival and in significant respiratory distress.  She was placed immediately on BiPAP.  Patient's echo from 2016 reviewed.  She had noted moderate aortic stenosis at that time.  EKG without focal ischemia though noted new LAFB.  Her screening troponin is slightly elevated though her BNP is quite elevated as well and clinically she appears fluid overloaded.  I suspect troponin leak from CHF.  Chest x-ray without definitive pneumonia and more suggestive of fluid overload.  Patient was diuresed in the department.  I considered possible PE  Route: Topical  
  
We Performed the Following AMB POC CK (CPK) [03607 CPT(R)] AMB POC COMPLETE CBC,AUTOMATED ENTER E6217782 CPT(R)] AMB POC COMPREHENSIVE METABOLIC PANEL [51207 CPT(R)] AMB POC FECAL BLOOD, OCCULT, QL 3 CARDS [73758 CPT(R)] AMB POC HEMOGLOBIN A1C [17410 CPT(R)] AMB POC LIPID PROFILE [80166 CPT(R)] AMB POC T4, FREE [id number] CPT(R)] AMB POC TSH [97866 CPT(R)] AMB POC URINALYSIS DIP STICK AUTO W/ MICRO  [63995 CPT(R)] AMB POC URINE, MICROALBUMIN, SEMIQUANT (1 RESULT) [89114 CPT(R)] AMB POC VITAMIN D [55344 CPT(R)] HEPATITIS C AB [91774 CPT(R)] INFLUENZA VIRUS VACCINE, HIGH DOSE SEASONAL, PRESERVATIVE FREE [21168 CPT(R)] RI IMMUNIZ ADMIN,1 SINGLE/COMB VAC/TOXOID [id number] CPT(R)] Follow-up Instructions Return in about 3 months (around 1/2/2018). Introducing [facility]! [facility] introduces [employer]here[employer] patient portal. Now you can access parts of your medical record, email your doctor's office, and request medication refills online. 1. In your internet browser, go to [email or web address] 2. Click on the First Time User? Click Here link in the Sign In box. You will see the New Member Sign Up page. 3. Enter your Adhere[employer] Access Code exactly as it appears below. You will not need to use this code after youve completed the sign-up process. If you do not sign up before the expiration date, you must request a new code. · Adhere[employer] Access Code: [email or web address] Expires: 12/31/2017  8:11 AM 
 
4. Enter the last four digits of your Social Security Number (xxxx) and Date of Birth (mm/dd/yyyy) as indicated and click Submit. You will be taken to the next sign-up page. 5. Create a AdhereTx ID. This will be your AdhereTx login ID and cannot be changed, so think of one that is secure and easy to remember. 6. Create a AdhereTx password. You can change your password at any time. 7. Enter your Password Reset Question and Answer. This can be used at a later time if you forget your password. 8. Enter your e-mail address. You will receive e-mail notification when new information is available in [address] 9. Click Sign Up. You can now view and download portions of your medical record. 10. Click the Download Summary menu link to download a portable copy of your medical information. If you have questions, please visit the Frequently Asked Questions section of the St[employer] website. Remember, St[employer]edia is NOT to be used for urgent needs. For medical emergencies, dial 911. Now available from your iPhone and Android! Please provide this summary of care documentation to your next provider. Your primary care clinician is listed as Rishabh. If you have any questions after today's visit, please call [phone]. though lower suspicion.  She has reported outpatient recent antibiotics for management of sinusitis.  She is not septic appearing nor do I feel that this presentation is secondary to an infectious process at this time.  I will hold off on further antibiotics.  Patient remained hemodynamically stable during her time in the ED.  She was accepted to the ICU by the hospitalist.  She reported significant symptom improvement on BiPAP and did not require any further airway interventions.    Critical Care time:  was 30 minutes for this patient excluding procedures.    Diagnosis:    ICD-10-CM    1. Acute respiratory failure with hypoxia (H) J96.01 Procalcitonin     Procalcitonin     CANCELED: Procalcitonin   2. Acute on chronic congestive heart failure, unspecified heart failure type (H) I50.9        Disposition:  Admitted to ICU    Scribe Disclosure:  IOdilia, am serving as a scribe at 10:36 PM on 1/16/2020 to document services personally performed by Denisse Weir DO based on my observations and the provider's statements to me.    1/16/2020   [facility] EMERGENCY DEPARTMENT       Denisse Weir DO  01/17/20 0108

## 2020-02-05 NOTE — PROGRESS NOTES
Neurology Note    Patient ID:  Evita Baumann  [id number]  58 y.o.  1945      Date of Consultation:  February 6, 2020    Referring Physician: Dr. Jenna Kumar    Reason for Consultation: Weakness and falls    Subjective: I still fall       History of Present Illness:   Evita Baumann is a 70 y.o. female who returns to the neurology clinic at [facility] for an evaluation. She was just seen in clinic 2 months ago by a former neurologist, One Viraj Iglesias. 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 It appears she underwent an EMG in June 2019 that was reportedly normal.  She was on baclofen 20 mg 3 times a day. There was a concern that this was making her feel too weak and she was having intermittent falling. At that visit. It was felt that she had a cervical spondylosis and myopathy and that was contributing to a lot of her symptoms. it was felt that she was suffering from residual changes from the myelopathy. She was advised to stop the cyclobenzaprine and continue with the baclofen 10 mg twice a day and then add an additional 20 mg at bedtime. The patient reports that she noticed a tremendous change in her symptoms back in 2018. In July of that year she did have a larger spinal surgery. She was doing well in therapy afterwards but did have some musculoskeletal pain. She received dry needling for this and it was quite successful. She was not needing pain medications after 9 months of therapy however the therapist noticed that her balance was worsening. They also noticed that she was becoming incontinent. She then went back and ultimately needed to have a cervical spine surgery in March 2019. Since that time her balance does continue to worsen.      She is having severe weakness in her legs since then. She is trying to be active. She was given baclofen and it caused a cracked tongue and is off of the medicine completely now. She also has significant limitation in her movement. She is using a walker around the house. She has used this a lot more over the last month. She does not use her walker that she is using a cane. She has multiple other symptoms that she wanted to mention. She does feel that her vision is not as good as it was previously. She states that she needs reading glasses but they are not as strong. She also feels that her mind and memory is not working as well as it was previously. She feels it is taking longer for her to perform certain tasks. She also notices that her handwriting is poor. There has been a question that was brought up when she had a swallowing test about difficulty with swallowing and was there a neurological etiology. She is very concerned that there is a larger neurological issue causing all of her symptoms and that is her primary concern today. Past Medical History:   Diagnosis Date    Abnormal LFTs 08/24/2017    FATTY LIVER    Arthritis     OSTEO    Avascular necrosis of hip ([address] 08/24/2017    BILAT HIPS    Breast CA ([address] 1989, 2001    BILATERAL; SURGERY, CHEMO    Chronic pain     CKD (chronic kidney disease), stage II 8/24/2017    Coagulation disorder ([address] 1984    ITP  (DR CHU BRADSHAW) - PLATELETS DROPPED TO 5K    Depression     Diabetes ([address]     TYPE 2; NIDDM    DJD (degenerative joint disease), multiple sites 8/24/2017    GI bleed 2008    HEMORRHOIDS    Hyperlipemia     Hypertension with renal disease 8/24/2017    IBS (irritable bowel syndrome) 8/24/2017    Ill-defined condition 2015    PNEUMONIA X2 - HOSPITALIZED IN 2015    Interstitial lung disease ([address] 04/01/2019    Liver disease     FATTY LIVER    Myalgia 8/24/2017    On statin therapy 8/24/2017    Overactive bladder 8/24/2017    Pneumonia 04/2015    HOSPITALIZED 3 WEEKS.  Polymyalgia rheumatica ([facility] 8/24/2017    Prophylactic antibiotic 8/24/2017    Reflex abnormality     acid reflex    Rosacea         Past Surgical History:   Procedure Laterality Date    BREAST SURGERY PROCEDURE UNLISTED  1993, 2002    RECONSTRUCTION X2    HX APPENDECTOMY  1950    HX BREAST BIOPSY Bilateral     HX CATARACT REMOVAL Bilateral     HX COLONOSCOPY      HX ENDOSCOPY      HX GI      COLONOSCOPY    HX HEENT      WISDOM TEETH    HX HYSTERECTOMY  2000S    HX MASTECTOMY Right 1989    HX MASTECTOMY Left 2001    HX ORTHOPAEDIC  2008    LUMBAR FUSION    HX ORTHOPAEDIC  2018    RE-DO LUMBAR FUSION, AND ADDED THORACIC FUSION    HX ORTHOPAEDIC  03/26/2019    neckectomy    HX TUBAL LIGATION  1981    HX UROLOGICAL  2000s    BLADDER SLING    TOTAL HIP ARTHROPLASTY Left 11/16/2016    ANTERIOR APPROACH, DR Gwendolyn De La Torre; (POSTOP: STANDS ONE INCH TALLER ON LEFT FOOT)    TOTAL HIP ARTHROPLASTY Right 12/2016    ANTERIOR APPROACH (DR Gwendolyn De La Torre)        Family History   Problem Relation Age of Onset    Heart Disease Mother     Kidney Disease Mother     Lung Disease Mother         COPD    Diabetes Brother     Pacemaker Brother     Kidney Disease Brother     Hypertension Brother     Anesth Problems Neg Hx         Social History     Tobacco Use    Smoking status: Never Smoker    Smokeless tobacco: Never Used   Substance Use Topics    Alcohol use: No        Allergies   Allergen Reactions    Metformin Diarrhea    Statins-Hmg-Coa Reductase Inhibitors Myalgia     Myalgia with  multiple statins  Takes pravachol     Codeine Rash     Rash on thighs    Darvon [Propoxyphene] Other (comments)     \"I see worms\"    Pcn [Penicillins] Rash    Sulfa (Sulfonamide Antibiotics) Rash        Prior to Admission medications    Medication Sig Start Date End Date Taking? Authorizing Provider   B12-iodin-mag-zinc-nicole-herb193 50 mcg-75 mcg -100 mg cap Take  by mouth.    Yes Provider, Historical   fenofibrate nanocrystallized (TRICOR) 145 mg tablet TAKE 1 TABLET EVERY DAY 1/15/20  Yes Erasmo Chen MD   oxazepam (SERAX) 15 mg capsule TAKE 2 CAPSULES BY MOUTH NIGHTLY AT BEDTIME 12/31/19  Yes Zeferino Fuller MD   benzonatate (TESSALON) 200 mg capsule TAKE 1 CAPSULE BY MOUTH THREE TIMES DAILY AS NEEDED FOR COUGH - SWALLOW CAPSULE WHOLE (DO NOT CRUSH) 11/26/19  Yes Erasmo Chen MD   nystatin (MYCOSTATIN) 100,000 unit/mL suspension Take 5 mL by mouth four (4) times daily. swish and spit 11/26/19  Yes Erasmo Chen MD   pantoprazole (PROTONIX) 40 mg tablet  11/21/19  Yes Provider, Historical   potassium chloride (K-DUR, KLOR-CON) 20 mEq tablet Take 1 Tab by mouth daily. 11/18/19  Yes Erasmo Chen MD   clemastine 2.68 mg tab tablet clemastine 2.68 mg tablet   Yes Provider, Historical   buPROPion XL (WELLBUTRIN XL) 150 mg tablet Wellbutrin  mg 24 hr tablet, extended release   Yes Provider, Historical   metFORMIN (GLUCOPHAGE) 500 mg tablet Take  by mouth two (2) times daily (with meals). Indications: takes 1 at breakfast and 2 pills at dinner   Yes Provider, Historical   sertraline (ZOLOFT) 100 mg tablet TAKE 1 TABLET BY MOUTH DAILY 10/22/19  Yes Erasmo Chen MD   furosemide (LASIX) 40 mg tablet Take 1 Tab by mouth daily. 9/10/19  Yes Erasmo Chen MD   glimepiride (AMARYL) 4 mg tablet Take 1 Tab by mouth every morning. 8/9/19  Yes Erasmo Chen MD   montelukast sodium (SINGULAIR PO) Take  by mouth. Yes Provider, Historical   multivitamin, tx-iron-ca-min (THERA-M W/ IRON) 9 mg iron-400 mcg tab tablet Take 1 Tab by mouth daily. Yes Provider, Historical   pravastatin (PRAVACHOL) 80 mg tablet Take 1 Tab by mouth nightly. 6/7/19  Yes Erasmo Chen MD   albuterol [facility] HFA) 90 mcg/actuation inhaler Take 1 Puff by inhalation every four (4) hours as needed for Wheezing or Shortness of Breath.  5/3/19  Yes Erasmo Chen MD   cholecalciferol (VITAMIN D3) 1,000 unit cap Take  by mouth daily. Indications: unknown of the mg. Yes Provider, Historical   biotin-silicon diox-L-cysteine 3,000 mcg -100 mg-50 mg TbER Take 1 Tab by mouth daily. Yes Provider, Historical   glucose blood VI test strips (ACCU-CHEK CHERYL) strip Use once daily 2/11/19  Yes Campos Patterson MD   hydroCHLOROthiazide (HYDRODIURIL) 25 mg tablet TAKE 1 TABLET EVERY DAY 1/18/19  Yes Campos Patterson MD   multivitamin (ONE A DAY) tablet Take 1 Tab by mouth daily. Yes Provider, Historical   predniSONE (DELTASONE) 20 mg tablet Take  by mouth daily (with breakfast). Provider, Historical   baclofen (LIORESAL) 10 mg tablet Take 2 Tabs by mouth three (3) times daily. Patient taking differently: Take 20 mg by mouth three (3) times daily. Indications: pt is taking 1 pill in the morning, 2 at lunch and 2 at dinner 11/25/19   El Napoles MD   carvedilol (COREG) 6.25 mg tablet  9/21/19   Provider, Historical   SITagliptin (JANUVIA) 100 mg tablet Take 1 Tab by mouth daily. 8/9/19   Campos Patterson MD   azelaic acid (FINACEA) 15 % topical gel Apply  to affected area two (2) times a day. Provider, Historical   clindamycin (CLINDAGEL) 1 % topical gel Apply  to affected area two (2) times a day.  use thin film on affected area    Provider, Historical       Review of Systems:    General, constitutional: Weakness, fatigue  Eyes, vision: Blurry vision  Ears, nose, throat: negative  Cardiovascular, heart: negative  Respiratory: Shortness of breath due to her underlying pulmonary condition  Gastrointestinal: negative  Genitourinary: Incontinence  Musculoskeletal: Diffuse pain and weakness  Skin and integumentary: negative  Psychiatric: negative  Endocrine: negative  Neurological: negative, except for HPI  Hematologic/lymphatic: negative  Allergy/immunology: negative    Objective:     Visit Vitals  /80   Pulse 72   Ht 5' 4\" (1.626 m)   Wt 197 lb (89.4 kg)   SpO2 98%   BMI 33.81 kg/m²       Physical Exam:      General:  appears well nourished in no acute distress  Neck: no carotid bruits  Lungs: clear to auscultation  Heart:  no murmurs, regular rate  Lower extremity: peripheral pulses palpable and no edema  Skin: intact    Neurological exam:    Awake, alert, oriented to person, place and time  Recent and remote memory were normal  Attention and concentration were intact  Language was intact. There was no aphasia  Speech: no dysarthria  Fund of knowledge was preserved  She was always corrected answering questions but does seem slow to respond at times. Cranial nerves:   II-XII were tested    Perrrla  Fundoscopic examination revealed venous pulsations and no clear abnormalities  Visual fields were full  Eomi, no evidence of nystagmus  Facial sensation:  normal and symmetric  Facial motor: normal and symmetric  Hearing intact  SCM strength intact  Tongue: midline without fasciculations  There was no clear masked face ease. Motor: Tone normal    No evidence of fasciculations    Strength testing:   deltoid triceps biceps Wrist ext. Wrist flex. intrinsics Hip flex. Hip ext. Knee ext. Knee flex Dorsi flex Plantar flex   Right 5 5 5 5 5 5 4 5 5 5 5 5   Left 5 5 5 5 5 5 4 5 5 5 5 5     Pain does limit her ability to sustain a contraction. Sensory:  Upper extremity: intact to pp, light touch, and vibration > 10 seconds  Lower extremity: Pinprick was decreased to just above her ankle. Her vibration was present for only 1 second at her toes and 3 seconds at her ankles. It was present for 7 seconds at her knees  Reflexes:    Right Left  Biceps  1 1  Triceps 1 1  Brachiorad. 1 1  Patella  1 1  Achilles 1 1    Plantar response:  flexor bilaterally      Cerebellar testing:  no resting tremor apparent, he does have a mild postural tremor and then an action based tremor. She does have slowness in her movements of finger tapping and rapid alternating movements.       Romberg: Not assessed due to concerns of safety    Gait: Wide-based and antalgic. Labs:     Lab Results   Component Value Date/Time    Hemoglobin A1c 7.3 (H) 11/13/2019 12:37 PM    Sodium 140 11/13/2019 12:38 PM    Potassium 3.2 (L) 11/13/2019 12:38 PM    Chloride 93 (L) 11/13/2019 12:38 PM    Glucose 233 (H) 11/13/2019 12:38 PM    BUN 32.0 (H) 11/13/2019 12:38 PM    Creatinine 1.1 11/13/2019 12:38 PM    Calcium 10.8 (H) 11/13/2019 12:38 PM    WBC 8.1 11/13/2019 12:35 PM    HCT 42.3 11/13/2019 12:35 PM    HGB 14.6 11/13/2019 12:35 PM    PLATELET 180 54/56/8834 12:35 PM       Imaging:    Results from [place] encounter on 02/21/19   MRI CERV SPINE WO CONT    Narrative EXAM:  MRI CERV SPINE WO CONT    INDICATION:   Unsteady gait, Spinal stenosis of lumbar region with neurogenic  claudication    COMPARISON: None. TECHNIQUE: Multiplanar multisequence acquisition without contrast of the  cervical spine. CONTRAST: None. FINDINGS:  Straightening of the cervical spine. Grade 1 anterolisthesis of C7 on T1  measuring 3 mm. Grade 1 anterolisthesis of T1 on T2 measuring 3 mm. Vertebral  body heights are maintained without evidence of acute fracture. Diffusely  heterogeneous marrow signal without a suspicious focal osseous lesion. Advanced  multilevel degenerative disc disease as detailed below. The cervical cord is  normal in size and signal. Region of the foramen magnum is unremarkable. At  C1-C2, there is degenerative changes at the atlantodental joint, with a small  cyst at the posterior aspect of the left dens measuring 6 x 3 mm, likely  representing a synovial cyst. No significant spinal canal narrowing. Visualized  soft tissues are unremarkable. C2-C3: Severe left and mild right facet arthropathy. No significant disc  herniation, spinal canal or neural foraminal stenosis. C3-C4: Diffuse disc osteophyte complex with bilateral uncovertebral spurring,  right worse than left. Severe bilateral facet arthropathy.  No significant spinal  canal stenosis. Severe right and mild left neural foraminal stenosis. C4-C5: Diffuse disc osteophyte complex with bilateral uncovertebral spurring. Severe bilateral facet arthropathy. No significant spinal canal stenosis. Mild  bilateral neural foraminal stenosis. C5-C6: Diffuse disc osteophyte complex with bilateral uncovertebral spurring,  right worse than left. Superimposed small central disc extrusion with inferior  migration. Severe bilateral facet arthropathy. Moderate spinal canal stenosis  with indentation of the ventral cord. Severe bilateral neural foraminal  stenosis. C6-C7: Diffuse disc osteophyte complex with bilateral uncovertebral spurring. Severe bilateral facet arthropathy. Mild spinal canal stenosis. Severe bilateral  neural foraminal stenosis. C7-T1: Grade 1 anterolisthesis with uncovering of the disc. Severe bilateral  facet arthropathy. Mild spinal canal stenosis. Severe bilateral neural foraminal  stenosis. At T2-T3, there is a diffuse disc bulge which results in spinal canal stenosis  with indentation of the ventral cord. Severe facet arthropathy is also noted at  T2-T3. Impression IMPRESSION:    1. Moderate spinal canal stenosis with indentation of the ventral cord, and  severe bilateral neural foraminal stenosis at C5-C6. No cord signal abnormality. 2. Mild spinal canal stenosis and severe bilateral neural foraminal stenosis at  C6-C7. 3. Severe bilateral neural foraminal stenosis at C7-T1. Severe right neural  foraminal stenosis at C3-C4. Remaining degenerative changes as detailed above. 4. Grade 1 anterolisthesis of C7 on T1 and T1 on T2. Results from [place] encounter on 09/13/19   CT HEAD WO CONT    Narrative EXAM: CT HEAD WO CONT    INDICATION: Wobbliness and gait instability. Assess for stroke    COMPARISON: None. CONTRAST: None. TECHNIQUE: Unenhanced CT of the head was performed using 5 mm images.  Brain and  bone windows were generated. CT dose reduction was achieved through use of a  standardized protocol tailored for this examination and automatic exposure  control for dose modulation. FINDINGS:  No extra-axial fluid collection hemorrhage or shift. No mass . Impression IMPRESSION: Negative. Assessment and Plan:   The patient is a pleasant 79-year-old female with multiple medical problems as noted below which impacts her neurological health who presents with worsening cognition, balance difficulties, pain, and weakness. Her neurological examination does reveal slowness in cognitive thought, proximal lower extremity weakness, a distal peripheral neuropathy, and an action and postural tremor. Lower extremity weakness/balance difficulties. This very well may be multifactorial in nature. She has had multiple spinal surgeries. There is also multiple medical problems that can attribute to her overall weakness. Her polymyalgia rheumatica can also be contributing. I do think on examination there is some element of a neuropathy. I would like to perform an EMG nerve conduction study to look for both myopathic features as well as neuropathic features. I did discuss the rationale of this with her today. I do not think any other additional testing would be necessary. The neuropathy may be multifactorial including related to her multiple medical conditions including neuropathy    Cognitive slowing: The differential for this also is quite broad. This can include early onset dementia or cognitive impairment versus medication induced versus vascular disease given her multiple medical conditions. She should get an MRI of her brain. I will not perform contrasting due to her kidney function. New onset tremor: This does appear more of an essential tremor. She does not have any parkinsonian features on examination today. I did discuss this with her today.   I do think the brain MRI would be important to ensure that there is no focal abnormality which can be contributing to her tremor. We did talk about all of her medical conditions at length today. I am not certain if there is one underlying neurodegenerative diagnosis to explain her symptoms. This very well may be multifactorial related to her other medical conditions. At this time she will continue all of her current medicines at that dosing until we get additional information. Once we have the EMG and MRI completed I will have her come back to clinic and we will discuss.       Patient Active Problem List   Diagnosis Code    Controlled type 2 diabetes mellitus with stage 2 chronic kidney disease, with long-term current use of insulin ([facility] E11.22, N18.2, Z79.4    Non-seasonal allergic rhinitis J30.89    Class 1 obesity due to excess calories without serious comorbidity with body mass index (BMI) of 33.0 to 33.9 in adult E66.09, Z68.33    Rosacea L71.9    Mixed hyperlipidemia E78.2    Anxiety F41.9    GI bleed K92.2    Overactive bladder N32.81    Polymyalgia rheumatica ([facility]) M35.3    IBS (irritable bowel syndrome) K58.9    Hypertension with renal disease I12.9    CKD (chronic kidney disease), stage II N18.2    Avascular necrosis of hip ([facility]) M87.059    Abnormal LFTs R94.5    Vitamin D deficiency E55.9    Recurrent depression ([facility]) F33.9    Primary osteoarthritis involving multiple joints M15.0    Sleep disturbance G47.9    Lumbar disc disease M51.9    Spinal stenosis, lumbar M48.061    Dyspnea on exertion R06.09    Spinal stenosis, cervical region M48.02    Cervical stenosis of spine M48.02    Interstitial lung disease ([facility]) J84.9    Acute bronchitis J20.9    Non-recurrent acute suppurative otitis media of left ear without spontaneous rupture of tympanic membrane H66.002    Impacted cerumen of right ear H61.21    Other fatigue R53.83    Gait instability R26.81    Glossitis K14.0    Cough R05             The patient should return to clinic after testing    Renewed medication: none today    I spent  50   minutes with the patient  with over 50 % of the time counseling and coordinating the care plan in regards to the diagnosis, diagnostic testing, and treatment plan. The patient had the ability to ask questions and all questions were answered.            Signed By:  Mirella Davidson DO FAAN    February 6, 2020

## 2020-02-06 ENCOUNTER — OFFICE VISIT (OUTPATIENT)
Dept: NEUROLOGY | Age: 75
End: 2020-02-06

## 2020-02-06 VITALS
HEIGHT: 64 IN | BODY MASS INDEX: 33.63 KG/M2 | HEART RATE: 72 BPM | WEIGHT: 197 LBS | OXYGEN SATURATION: 98 % | DIASTOLIC BLOOD PRESSURE: 80 MMHG | SYSTOLIC BLOOD PRESSURE: 134 MMHG

## 2020-02-06 DIAGNOSIS — R26.81 GAIT INSTABILITY: Primary | ICD-10-CM

## 2020-02-06 DIAGNOSIS — G60.9 IDIOPATHIC PERIPHERAL NEUROPATHY: ICD-10-CM

## 2020-02-06 NOTE — PATIENT INSTRUCTIONS
A Healthy Lifestyle: Care Instructions  Your Care Instructions    A healthy lifestyle can help you feel good, stay at a healthy weight, and have plenty of energy for both work and play. A healthy lifestyle is something you can share with your whole family. A healthy lifestyle also can lower your risk for serious health problems, such as high blood pressure, heart disease, and diabetes. You can follow a few steps listed below to improve your health and the health of your family. Follow-up care is a key part of your treatment and safety. Be sure to make and go to all appointments, and call your doctor if you are having problems. It's also a good idea to know your test results and keep a list of the medicines you take. How can you care for yourself at home? · Do not eat too much sugar, fat, or fast foods. You can still have dessert and treats now and then. The goal is moderation. · Start small to improve your eating habits. Pay attention to portion sizes, drink less juice and soda pop, and eat more fruits and vegetables. ? Eat a healthy amount of food. A 3-ounce serving of meat, for example, is about the size of a deck of cards. Fill the rest of your plate with vegetables and whole grains. ? Limit the amount of soda and sports drinks you have every day. Drink more water when you are thirsty. ? Eat at least 5 servings of fruits and vegetables every day. It may seem like a lot, but it is not hard to reach this goal. A serving or helping is 1 piece of fruit, 1 cup of vegetables, or 2 cups of leafy, raw vegetables. Have an apple or some carrot sticks as an afternoon snack instead of a candy bar. Try to have fruits and/or vegetables at every meal.  · Make exercise part of your daily routine. You may want to start with simple activities, such as walking, bicycling, or slow swimming. Try to be active 30 to 60 minutes every day. You do not need to do all 30 to 60 minutes all at once.  For example, you can exercise 3 times a day for 10 or 20 minutes. Moderate exercise is safe for most people, but it is always a good idea to talk to your doctor before starting an exercise program.  · Keep moving. [place] [employer] the lawn, work in the garden, or [employer]. Take the stairs instead of the elevator at work. · If you smoke, quit. People who smoke have an increased risk for heart attack, stroke, cancer, and other lung illnesses. Quitting is hard, but there are ways to boost your chance of quitting tobacco for good. ? Use nicotine gum, patches, or lozenges. ? Ask your doctor about stop-smoking programs and medicines. ? Keep trying. In addition to reducing your risk of diseases in the future, you will notice some benefits soon after you stop using tobacco. If you have shortness of breath or asthma symptoms, they will likely get better within a few weeks after you quit. · Limit how much alcohol you drink. Moderate amounts of alcohol (up to 2 drinks a day for men, 1 drink a day for women) are okay. But drinking too much can lead to liver problems, high blood pressure, and other health problems. Family health  If you have a family, there are many things you can do together to improve your health. · Eat meals together as a family as often as possible. · Eat healthy foods. This includes fruits, vegetables, lean meats and dairy, and whole grains. · Include your family in your fitness plan. Most people think of activities such as jogging or tennis as the way to fitness, but there are many ways you and your family can be more active. Anything that makes you breathe hard and gets your heart pumping is exercise. Here are some tips:  ? Walk to do errands or to take your child to school or the bus.  ? Go for a family bike ride after dinner instead of watching TV. Where can you learn more? Go to http://malia-lincoln.info/. Enter K599 in the search box to learn more about \"A Healthy Lifestyle: Care Instructions. \"  Current as of: May 28, 2019  Content Version: 12.2  © 3998-8356 [employer]. Care instructions adapted under license by [employer] (which disclaims liability or warranty for this information). If you have questions about a medical condition or this instruction, always ask your healthcare professional. [address] any warranty or liability for your use of this information.

## 2020-02-12 PROBLEM — Z13.39 ALCOHOL SCREENING: Status: ACTIVE | Noted: 2020-02-12

## 2020-02-12 PROBLEM — Z00.00 MEDICARE ANNUAL WELLNESS VISIT, SUBSEQUENT: Status: ACTIVE | Noted: 2020-02-12

## 2020-02-12 NOTE — PROGRESS NOTES
This is a Subsequent Medicare Annual Wellness Visit providing Personalized Prevention Plan Services (PPPS) (Performed 12 months after initial AWV and PPPS )    I have reviewed the patient's medical history in detail and updated the computerized patient record. She presents today for Medicare subsequent annual wellness examination and screening questionnaire. She is also here in follow-up of her multiple medical problems which consist of hypertension, diabetes mellitus, hyperlipidemia, chronic interstitial lung disease, recurrent depression, history of avascular necrosis of her hip status post surgery, history of polymyalgia rheumatica, history of GI bleed, IBS, allergic rhinitis, severe DJD, CKD stage II, obesity, anxiety with history of depression and other multiple medical problems. She is chronically disabled secondary to her interstitial lung disease as well as her arthritis and does need a form was reviewed filled out regarding her disability. This is accomplished at the office visit today. She denies any chest pain or palpitations and notes no acute increase of her shortness of breath although it is gradually getting worse over time. She notes no PND, orthopnea, palpitations or other cardiorespiratory complaints and no increase of cough. She notes no change of her GI complaints including no nausea vomiting and appetite seems to be okay. She notes no current  complaints. She notes no headaches or dizziness but is slowly getting weaker and is currently being followed by neurology Dr. Abi Elizalde and does have scheduled MRI of her lumbar spine as well as nerve conduction studies to be done of her lower extremities and has follow-up with him. She does have follow-up scheduled with Dr. Ferol Snellen from immunology standpoint she has a chronic debilitating arthritic complaints which is slowly getting worse but no acute changes are noted.   She does note her right shoulder is bothering her so much, she would like to get a shot in it because it is to the point where it is keeping her awake at night and she is previously been evaluated by orthopedist who recommended she have it replaced but she is not at a point she wants to have it replaced. There are no other specific complaints. History     Past Medical History:   Diagnosis Date    Abnormal LFTs 08/24/2017    FATTY LIVER    Arthritis     OSTEO    Avascular necrosis of hip ([address] 08/24/2017    BILAT HIPS    Breast CA ([address] 1989, 2001    BILATERAL; SURGERY, CHEMO    Chronic pain     CKD (chronic kidney disease), stage II 8/24/2017    Coagulation disorder ([address] 1984    ITP  (DR CHU BRADSHAW) - PLATELETS DROPPED TO 5K    Depression     Diabetes ([address]     TYPE 2; NIDDM    DJD (degenerative joint disease), multiple sites 8/24/2017    GI bleed 2008    HEMORRHOIDS    Hyperlipemia     Hypertension with renal disease 8/24/2017    IBS (irritable bowel syndrome) 8/24/2017    Ill-defined condition 2015    PNEUMONIA X2 - HOSPITALIZED IN 2015    Interstitial lung disease ([address] 04/01/2019    Liver disease     FATTY LIVER    Myalgia 8/24/2017    On statin therapy 8/24/2017    Overactive bladder 8/24/2017    Pneumonia 04/2015    HOSPITALIZED 3 WEEKS.     Polymyalgia rheumatica ([address] 8/24/2017    Prophylactic antibiotic 8/24/2017    Reflex abnormality     acid reflex    Rosacea       Past Surgical History:   Procedure Laterality Date    BREAST SURGERY PROCEDURE UNLISTED  1993, 2002    RECONSTRUCTION X2    HX APPENDECTOMY  1950    HX BREAST BIOPSY Bilateral     HX CATARACT REMOVAL Bilateral     HX COLONOSCOPY      HX ENDOSCOPY      HX GI      COLONOSCOPY    HX HEENT      WISDOM TEETH    HX HYSTERECTOMY  2000S    HX MASTECTOMY Right 1989    HX MASTECTOMY Left 2001    HX ORTHOPAEDIC  2008    LUMBAR FUSION    HX ORTHOPAEDIC  2018    RE-DO LUMBAR FUSION, AND ADDED THORACIC FUSION    HX ORTHOPAEDIC  03/26/2019    neckectomy    HX TUBAL LIGATION  1981    HX UROLOGICAL  2000s    BLADDER SLING    TOTAL HIP ARTHROPLASTY Left 11/16/2016    ANTERIOR APPROACH, DR Gwendolyn De La Torre; (POSTOP: STANDS ONE INCH TALLER ON LEFT FOOT)    TOTAL HIP ARTHROPLASTY Right 12/2016    ANTERIOR APPROACH (DR Gwendolyn De La Torre)     Social History     Tobacco Use    Smoking status: Never Smoker    Smokeless tobacco: Never Used   Substance Use Topics    Alcohol use: No    Drug use: No     Current Outpatient Medications   Medication Sig Dispense Refill    methylPREDNISolone acetate (DEPO-MEDROL) 40 mg/mL injection 1 mL by IntraMUSCular route once for 1 dose. 1 Vial 0    B12-iodin-mag-zinc-nicole-herb193 50 mcg-75 mcg -100 mg cap Take  by mouth.  fenofibrate nanocrystallized (TRICOR) 145 mg tablet TAKE 1 TABLET EVERY DAY 30 Tab 11    oxazepam (SERAX) 15 mg capsule TAKE 2 CAPSULES BY MOUTH NIGHTLY AT BEDTIME 180 Cap 1    pantoprazole (PROTONIX) 40 mg tablet       clemastine 2.68 mg tab tablet clemastine 2.68 mg tablet      buPROPion XL (WELLBUTRIN XL) 150 mg tablet Wellbutrin  mg 24 hr tablet, extended release      metFORMIN (GLUCOPHAGE) 500 mg tablet Take  by mouth two (2) times daily (with meals). Indications: takes 1 at breakfast and 2 pills at dinner      carvedilol (COREG) 6.25 mg tablet       sertraline (ZOLOFT) 100 mg tablet TAKE 1 TABLET BY MOUTH DAILY 90 Tab 3    furosemide (LASIX) 40 mg tablet Take 1 Tab by mouth daily. 30 Tab prn    SITagliptin (JANUVIA) 100 mg tablet Take 1 Tab by mouth daily. 90 Tab prn    glimepiride (AMARYL) 4 mg tablet Take 1 Tab by mouth every morning. 90 Tab prn    montelukast sodium (SINGULAIR PO) Take  by mouth.  multivitamin, tx-iron-ca-min (THERA-M W/ IRON) 9 mg iron-400 mcg tab tablet Take 1 Tab by mouth daily.  pravastatin (PRAVACHOL) 80 mg tablet Take 1 Tab by mouth nightly. 90 Tab 3    albuterol (PROAIR HFA) 90 mcg/actuation inhaler Take 1 Puff by inhalation every four (4) hours as needed for Wheezing or Shortness of Breath.  1 Inhaler prn  cholecalciferol (VITAMIN D3) 1,000 unit cap Take  by mouth daily. Indications: unknown of the mg.  biotin-silicon diox-L-cysteine 3,000 mcg -100 mg-50 mg TbER Take 1 Tab by mouth daily.  glucose blood VI test strips (ACCU-CHEK CHERYL) strip Use once daily 50 Strip prn    hydroCHLOROthiazide (HYDRODIURIL) 25 mg tablet TAKE 1 TABLET EVERY DAY 90 Tab 3    multivitamin (ONE A DAY) tablet Take 1 Tab by mouth daily.  azelaic acid (FINACEA) 15 % topical gel Apply  to affected area two (2) times a day.  clindamycin (CLINDAGEL) 1 % topical gel Apply  to affected area two (2) times a day.  use thin film on affected area       Allergies   Allergen Reactions    Metformin Diarrhea    Statins-Hmg-Coa Reductase Inhibitors Myalgia     Myalgia with  multiple statins  Takes pravachol     Codeine Rash     Rash on thighs    Darvon [Propoxyphene] Other (comments)     \"I see worms\"    Pcn [Penicillins] Rash    Sulfa (Sulfonamide Antibiotics) Rash     Family History   Problem Relation Age of Onset    Heart Disease Mother     Kidney Disease Mother     Lung Disease Mother         COPD    Diabetes Brother     Pacemaker Brother     Kidney Disease Brother     Hypertension Brother     Anesth Problems Neg Hx        Patient Active Problem List    Diagnosis    [facility]    Primary osteoarthritis involving multiple joints    Hypertension with renal disease    CKD (chronic kidney disease), stage II    Controlled type 2 diabetes mellitus with stage 2 chronic kidney disease, with long-term current use of insulin (HCC)    Mixed hyperlipidemia    Vitamin D deficiency    IBS (irritable bowel syndrome)    Non-seasonal allergic rhinitis    Class 1 obesity due to excess calories without serious comorbidity with body mass index (BMI) of 33.0 to 33.9 in adult    Primary osteoarthritis of right shoulder    Alcohol screening    Medicare annual wellness visit, subsequent    Glossitis    Cough    Other fatigue    Gait instability    Non-recurrent acute suppurative otitis media of left ear without spontaneous rupture of tympanic membrane    Impacted cerumen of right ear    Acute bronchitis    Cervical stenosis of spine    Spinal stenosis, cervical region    Dyspnea on exertion    Spinal stenosis, lumbar    Lumbar disc disease    Sleep disturbance    Recurrent depression (HCC)    GI bleed    Overactive bladder    Polymyalgia rheumatica (HCC)    Avascular necrosis of hip (HCC)    Abnormal LFTs    Rosacea    Anxiety       Patient Care Team:  Yumi Obrien MD as PCP - General (Internal Medicine)  Yumi Obrien MD as PCP - [facility]  Jerald Devine MD (Orthopedic Surgery)    Depression Risk Factor Screening:     3 most recent PHQ Screens 2/13/2020   Little interest or pleasure in doing things Nearly every day   Feeling down, depressed, irritable, or hopeless More than half the days   Total Score PHQ 2 5   Trouble falling or staying asleep, or sleeping too much Nearly every day   Feeling tired or having little energy Nearly every day   Poor appetite, weight loss, or overeating Not at all   Feeling bad about yourself - or that you are a failure or have let yourself or your family down Not at all   Trouble concentrating on things such as school, work, reading, or watching TV Nearly every day   Moving or speaking so slowly that other people could have noticed; or the opposite being so fidgety that others notice Nearly every day   Thoughts of being better off dead, or hurting yourself in some way Not at all   PHQ 9 Score 17   How difficult have these problems made it for you to do your work, take care of your home and get along with others Extremely difficult     Alcohol Risk Factor Screening:     Alcohol Risk Factor Screening:   Do you average 1 drink per night or more than 7 drinks a week:  No    On any one occasion in the past three months have you have had more than 3 drinks containing alcohol:  No    Functional Ability and Level of Safety:     Fall Risk     Fall Risk Assessment, last 12 mths 2/13/2020   Able to walk? Yes   Fall in past 12 months? Yes   Fall with injury? No   Number of falls in past 12 months 3   Fall Risk Score 3       Hearing Loss   mild    Activities of Daily Living   Self-care. ADL Assessment 2/13/2020   Feeding yourself No Help Needed   Getting from bed to chair Help Needed   Getting dressed Help Needed   Bathing or showering Help Needed   Walk across the room (includes cane/walker) Help Needed   Using the telphone No Help Needed   Taking your medications No Help Needed   Preparing meals Help Needed   Managing money (expenses/bills) No Help Needed   Moderately strenuous housework (laundry) Help Needed   Shopping for personal items (toiletries/medicines) Help Needed   Shopping for groceries Help Needed   Driving No Help Needed   Climbing a flight of stairs Help Needed   Getting to places beyond walking distances Help Needed       Abuse Screen   Patient is not abused    Social History     Patient does not qualify to have social determinant information on file (likely too young). Social History Narrative    Not on file       Review of Systems      ROS:    Constitutional: She denies fevers, weight loss, sweats. Eyes: No blurred or double vision. ENT: No difficulty with swallowing, taste, speech or smell. NECK: no stiffness swelling or lymph node enlargement  Respiratory: No cough wheezing or or sudden change of her chronic shortness of breath. Cardiovascular: Denies chest pain, palpitations, unexplained indigestion or syncope. Breast: She has noted no masses or lumps and no discharge or axillary swelling  Gastrointestinal:  No changes in bowel movements, no abdominal pain, no bloating.   Genitourinary: No discharge or abnormal bleeding or pain  Extremities: No change of her chronic joint pain, stiffness or swelling except the right shoulder is gotten to the point where is keeping her awake at night and she would like to get a cortisone shot in that. .  Neurological:  No tingling, burring paresthesias or loss of motor strength. No syncope, dizziness or frequent headache. She is noting some numbness in her legs and is being evaluated by neurology with nerve conduction studies and MRI of lumbar spine for that  Skin:  No recent rashes or mole changes. Psychiatric/Behavioral:  Negative for depression. The patient is not nervous/anxious. HEMATOLOGIC: no easy bruising or bleeding gums  Endocrine: no sweats of urinary frequency or excessive thirst    Physical Examination     Evaluation of Cognitive Function:  Mood/affect:  happy  Appearance: age appropriate  Family member/caregiver input: none    Visit Vitals  /88   Pulse (!) 101   Temp 98.4 °F (36.9 °C) (Oral)   Resp 21   Ht 5' 4\" (1.626 m)   Wt 192 lb 12.8 oz (87.5 kg)   SpO2 96%   BMI 33.09 kg/m²     Vitals:    02/13/20 1032 02/13/20 1059   BP: (!) 134/92 136/88   Pulse: (!) 101    Resp: 21    Temp: 98.4 °F (36.9 °C)    TempSrc: Oral    SpO2: 96%    Weight: 192 lb 12.8 oz (87.5 kg)    Height: 5' 4\" (1.626 m)    PainSc:   8    PainLoc: Back         PHYSICAL EXAM:    General appearance - alert, well appearing, and in no distress  Mental status - alert, oriented to person, place, and time  HEENT:  Ears - bilateral TM's and external ear canals clear  Eyes - pupillary responses were normal.  Extraocular muscle function intact. Lids and conjunctiva not injected. Fundoscopic exam revealed sharp disc margins. eye movements intact  Pharynx- clear with teeth in good repair. No masses were noted  Neck - supple without thyromegaly or burit. No JVD noted  Lungs - clear to auscultation and percussion with overall decreased breath sounds and occasional scattered rhonchi  Cardiac- normal rate, regular rhythm without murmurs. PMI not displaced.   No gallop, rub or click  Breast: deferred to GYN  Abdomen - flat, soft, non-tender without palpable organomegaly or mass. No pulsatile mass was felt, and not bruit was heard. Bowel sounds were active   Female - deferred to GYN  Rectal - deferred to GYN  Extremities -  no clubbing cyanosis or edema. No diabetic foot changes noted. Right shoulder has discomfort to palpation with increased discomfort on range of motion without joint effusion, erythema increased temperature. Lymphatics - no palpable lymphadenopathy, no hepatosplenomegaly  Peripheral vascular - Dorsalis pedis and posterior tibial pulses felt without difficulty  Skin - no rash or unusual mole change noted  Neurological - Cranial nerves II-XII grossly intact. Motor strength 5/5. DTR's 2+ and symmetric. Station and gait normal.  Normal distal sensation and proprioception all toes both feet. Back exam - full range of motion, no tenderness, palpable spasm or pain on motion  Musculoskeletal - no joint tenderness, deformity or swelling  Hematologic: no purpura, petechiae or bruising    Results for orders placed or performed in visit on 02/13/20   URINALYSIS W/O MICRO   Result Value Ref Range    Color brown (A) Pale Yellow - Yellow    CLARITY clear Clear    Glucose urine, 24 hr 4+ (A) Negative    Ketone Negative Negative    Bilirubin 1+ (A) Negative    Specific gravity 1.020 1.000 - 1.030    pH (UA) 5 5 - 7    Blood Negative Negative    Protein 2+ (A) Negative    Urobilinogen 1+ (A) Negative    Nitrites Negative Negative    Leukocyte Esterase 2+ (A) Negative       Advice/Referrals/Counseling   Education and counseling provided:  Are appropriate based on today's review and evaluation  End-of-Life planning (with patient's consent)  Pneumococcal Vaccine  Influenza Vaccine  Colorectal cancer screening tests      Assessment/Plan     ASSESSMENT:   1. Hypertension with renal disease    2. Controlled type 2 diabetes mellitus with stage 2 chronic kidney disease, with long-term current use of insulin ([facility] [address]    3.  Mixed hyperlipidemia    4. Interstitial lung disease ([facility]    5. Primary osteoarthritis involving multiple joints    6. CKD (chronic kidney disease), stage II    7. Vitamin D deficiency    8. Non-seasonal allergic rhinitis, unspecified trigger    9. Irritable bowel syndrome, unspecified type    10. Class 1 obesity due to excess calories without serious comorbidity with body mass index (BMI) of 33.0 to 33.9 in adult    11. Recurrent depression ([facility]    12. Polymyalgia rheumatica ([facility]    13. Avascular necrosis of bone of hip, unspecified laterality ([facility]    14. Alcohol screening    15. Medicare annual wellness visit, subsequent    16. Primary osteoarthritis of right shoulder    17. Urinary tract infection without hematuria, site unspecified      Impression  1. Hypertension that is adequate control so continue current therapy reviewed with her. 2.  Diabetes mellitus repeat status pending a prior lab reviewed and I will make adjustments if necessary. 3.  Hyperlipidemia prior lab reviewed and repeat status pending and I will adjust if needed. 4. Interstitial lung disease still followed by pulmonary and currently stable with O2 sat of 96% now on room air although marked debility secondary to her inability to physically be active because of increased dyspnea  5. DJD chronic but stable only flare is the right shoulder currently  6. CKD stage II repeat status pending  7. Vitamin D deficiency repeat status pending  8. Allergic rhinitis currently stable  9. IBS stable  10. Obesity we did discuss diet, exercise and weight reduction for overall health benefit although I note her limited ability to exercise because of her lung disease and arthritis. 11.  Depression that is stable  12   Polymyalgia rheumatica I think that is currently in remission and still followed by rheumatology to present time  13. Avascular necrosis of the hip status post treatment with surgery  14.   Annual alcohol screening is done and she claims at this time she does not drink alcohol at all. I did caution her females drinking more than 1 drink per day with increased cardiovascular risk and increased risk of liver disease and other GI complications  15. DJD of right shoulder we did discuss treatment and she elected to go with injection. After informed consent and Betadine scrub the right shoulder centered posteriorly injected with Depo-Medrol 40 mg a cc lidocaine which tolerated quite well. 16.  Recurrent urinary tract infection urine and culture pending  Medicare subsequent annual wellness examination screening questionnaire is completed today. The results were reviewed with her and her questions were answered. Lifestyle recommendations and modifications discussed and made. I will call with lab results and make further recommendations or adjustments if necessary. Follow-up scheduled again for 3 months with a clear understanding I will see her sooner should it become a problem. High complexity patient today with high complexity decision making on this extended 40-minute office visit not including time spent on Medicare wellness exam or procedures. Also note that her disability forms were filled out today during her office visit as well. Greater than 50% of time in counseling coordination of care.     PLAN:  .  Orders Placed This Encounter    DRAIN/INJECT LARGE JOINT/BURSA    CULTURE, URINE    CBC WITH AUTOMATED DIFF    METABOLIC PANEL, COMPREHENSIVE ([facility])    LIPID PANEL ([facility])    CK ([facility])    HEMOGLOBIN A1C W/O EAG ([facility])    T4, FREE ([facility])    TSH 3RD GENERATION ([facility])    VITAMIN D, 25 HYDROXY ([facility])    URINALYSIS W/O MICRO ([facility])    URINE, MICROALBUMIN, SEMIQUANTITATIVE ([facility])    URINALYSIS; MICROSCOPIC ONLY    methylPREDNISolone acetate (DEPO-MEDROL) 40 mg/mL injection         ATTENTION:   This medical record was transcribed using an electronic medical records system. Although proofread, it may and can contain electronic and spelling errors. Other human spelling and other errors may be present. Corrections may be executed at a later time. Please feel free to contact us for any clarifications as needed. Follow-up and Dispositions    · Return in about 3 months (around 5/13/2020). Dejon Regan MD    Recommended healthy diet low in carbohydrates, fats, sodium and cholesterol. Recommended regular cardiovascular exercise 3-6 times per week for 30-60 minutes daily. Current Outpatient Medications   Medication Sig Dispense Refill    methylPREDNISolone acetate (DEPO-MEDROL) 40 mg/mL injection 1 mL by IntraMUSCular route once for 1 dose. 1 Vial 0    B12-iodin-mag-zinc-nicole-herb193 50 mcg-75 mcg -100 mg cap Take  by mouth.  fenofibrate nanocrystallized (TRICOR) 145 mg tablet TAKE 1 TABLET EVERY DAY 30 Tab 11    oxazepam (SERAX) 15 mg capsule TAKE 2 CAPSULES BY MOUTH NIGHTLY AT BEDTIME 180 Cap 1    pantoprazole (PROTONIX) 40 mg tablet       clemastine 2.68 mg tab tablet clemastine 2.68 mg tablet      buPROPion XL (WELLBUTRIN XL) 150 mg tablet Wellbutrin  mg 24 hr tablet, extended release      metFORMIN (GLUCOPHAGE) 500 mg tablet Take  by mouth two (2) times daily (with meals). Indications: takes 1 at breakfast and 2 pills at dinner      carvedilol (COREG) 6.25 mg tablet       sertraline (ZOLOFT) 100 mg tablet TAKE 1 TABLET BY MOUTH DAILY 90 Tab 3    furosemide (LASIX) 40 mg tablet Take 1 Tab by mouth daily. 30 Tab prn    SITagliptin (JANUVIA) 100 mg tablet Take 1 Tab by mouth daily. 90 Tab prn    glimepiride (AMARYL) 4 mg tablet Take 1 Tab by mouth every morning. 90 Tab prn    montelukast sodium (SINGULAIR PO) Take  by mouth.  multivitamin, tx-iron-ca-min (THERA-M W/ IRON) 9 mg iron-400 mcg tab tablet Take 1 Tab by mouth daily.       pravastatin (PRAVACHOL) 80 mg tablet Take 1 Tab by mouth nightly. 90 Tab 3    albuterol (PROAIR HFA) 90 mcg/actuation inhaler Take 1 Puff by inhalation every four (4) hours as needed for Wheezing or Shortness of Breath. 1 Inhaler prn    cholecalciferol (VITAMIN D3) 1,000 unit cap Take  by mouth daily. Indications: unknown of the mg.  biotin-silicon diox-L-cysteine 3,000 mcg -100 mg-50 mg TbER Take 1 Tab by mouth daily.  glucose blood VI test strips (ACCU-CHEK CHERYL) strip Use once daily 50 Strip prn    hydroCHLOROthiazide (HYDRODIURIL) 25 mg tablet TAKE 1 TABLET EVERY DAY 90 Tab 3    multivitamin (ONE A DAY) tablet Take 1 Tab by mouth daily.  azelaic acid (FINACEA) 15 % topical gel Apply  to affected area two (2) times a day.  clindamycin (CLINDAGEL) 1 % topical gel Apply  to affected area two (2) times a day. use thin film on affected area         Results for orders placed or performed in visit on 02/13/20   URINALYSIS W/O MICRO   Result Value Ref Range    Color brown (A) Pale Yellow - Yellow    CLARITY clear Clear    Glucose urine, 24 hr 4+ (A) Negative    Ketone Negative Negative    Bilirubin 1+ (A) Negative    Specific gravity 1.020 1.000 - 1.030    pH (UA) 5 5 - 7    Blood Negative Negative    Protein 2+ (A) Negative    Urobilinogen 1+ (A) Negative    Nitrites Negative Negative    Leukocyte Esterase 2+ (A) Negative       Verbal and written instructions (see AVS) provided. Patient expresses understanding of diagnosis and treatment plan.     Francisco Whitlock MD

## 2020-02-13 ENCOUNTER — OFFICE VISIT (OUTPATIENT)
Dept: INTERNAL MEDICINE CLINIC | Age: 75
End: 2020-02-13

## 2020-02-13 VITALS
RESPIRATION RATE: 21 BRPM | HEIGHT: 64 IN | OXYGEN SATURATION: 96 % | DIASTOLIC BLOOD PRESSURE: 88 MMHG | BODY MASS INDEX: 32.91 KG/M2 | SYSTOLIC BLOOD PRESSURE: 136 MMHG | TEMPERATURE: 98.4 F | WEIGHT: 192.8 LBS | HEART RATE: 101 BPM

## 2020-02-13 DIAGNOSIS — J84.9 INTERSTITIAL LUNG DISEASE (HCC): ICD-10-CM

## 2020-02-13 DIAGNOSIS — M87.059 AVASCULAR NECROSIS OF BONE OF HIP, UNSPECIFIED LATERALITY (HCC): ICD-10-CM

## 2020-02-13 DIAGNOSIS — E55.9 VITAMIN D DEFICIENCY: ICD-10-CM

## 2020-02-13 DIAGNOSIS — E11.22 CONTROLLED TYPE 2 DIABETES MELLITUS WITH STAGE 2 CHRONIC KIDNEY DISEASE, WITH LONG-TERM CURRENT USE OF INSULIN (HCC): ICD-10-CM

## 2020-02-13 DIAGNOSIS — M15.9 PRIMARY OSTEOARTHRITIS INVOLVING MULTIPLE JOINTS: ICD-10-CM

## 2020-02-13 DIAGNOSIS — Z13.39 ALCOHOL SCREENING: ICD-10-CM

## 2020-02-13 DIAGNOSIS — Z00.00 MEDICARE ANNUAL WELLNESS VISIT, SUBSEQUENT: ICD-10-CM

## 2020-02-13 DIAGNOSIS — M35.3 POLYMYALGIA RHEUMATICA (HCC): ICD-10-CM

## 2020-02-13 DIAGNOSIS — N39.0 URINARY TRACT INFECTION WITHOUT HEMATURIA, SITE UNSPECIFIED: ICD-10-CM

## 2020-02-13 DIAGNOSIS — N18.2 CONTROLLED TYPE 2 DIABETES MELLITUS WITH STAGE 2 CHRONIC KIDNEY DISEASE, WITH LONG-TERM CURRENT USE OF INSULIN (HCC): ICD-10-CM

## 2020-02-13 DIAGNOSIS — M19.011 PRIMARY OSTEOARTHRITIS OF RIGHT SHOULDER: ICD-10-CM

## 2020-02-13 DIAGNOSIS — K58.9 IRRITABLE BOWEL SYNDROME, UNSPECIFIED TYPE: ICD-10-CM

## 2020-02-13 DIAGNOSIS — J30.89 NON-SEASONAL ALLERGIC RHINITIS, UNSPECIFIED TRIGGER: ICD-10-CM

## 2020-02-13 DIAGNOSIS — E78.2 MIXED HYPERLIPIDEMIA: ICD-10-CM

## 2020-02-13 DIAGNOSIS — E66.09 CLASS 1 OBESITY DUE TO EXCESS CALORIES WITHOUT SERIOUS COMORBIDITY WITH BODY MASS INDEX (BMI) OF 33.0 TO 33.9 IN ADULT: ICD-10-CM

## 2020-02-13 DIAGNOSIS — N18.2 CKD (CHRONIC KIDNEY DISEASE), STAGE II: ICD-10-CM

## 2020-02-13 DIAGNOSIS — F33.9 RECURRENT DEPRESSION (HCC): ICD-10-CM

## 2020-02-13 DIAGNOSIS — I12.9 HYPERTENSION WITH RENAL DISEASE: Primary | ICD-10-CM

## 2020-02-13 DIAGNOSIS — Z79.4 CONTROLLED TYPE 2 DIABETES MELLITUS WITH STAGE 2 CHRONIC KIDNEY DISEASE, WITH LONG-TERM CURRENT USE OF INSULIN (HCC): ICD-10-CM

## 2020-02-13 LAB
25(OH)D3 SERPL-MCNC: 72 NG/ML (ref 30–96)
A-G RATIO,AGRAT: 1.4 RATIO
ALBUMIN SERPL-MCNC: 4.9 G/DL (ref 3.9–5.4)
ALP SERPL-CCNC: 94 U/L (ref 38–126)
ALT SERPL-CCNC: 52 U/L (ref 0–35)
ANION GAP SERPL CALC-SCNC: 19 MMOL/L
AST SERPL W P-5'-P-CCNC: 74 U/L (ref 14–36)
BACTERIA,BACTU: ABNORMAL
BILIRUB SERPL-MCNC: 0.7 MG/DL (ref 0.2–1.3)
BILIRUB UR QL: ABNORMAL
BUN SERPL-MCNC: 23 MG/DL (ref 7–17)
BUN/CREATININE RATIO,BUCR: 26 RATIO
CALCIUM SERPL-MCNC: 10.4 MG/DL (ref 8.4–10.2)
CHLORIDE SERPL-SCNC: 98 MMOL/L (ref 98–107)
CHOL/HDL RATIO,CHHD: 4 RATIO (ref 0–4)
CHOLEST SERPL-MCNC: 190 MG/DL (ref 0–200)
CK SERPL-CCNC: 54 U/L (ref 30–135)
CLARITY: CLEAR
CO2 SERPL-SCNC: 26 MMOL/L (ref 22–32)
COLOR UR: ABNORMAL
CREAT SERPL-MCNC: 0.9 MG/DL (ref 0.7–1.2)
GLOBULIN,GLOB: 3.5
GLUCOSE 24H UR-MRATE: ABNORMAL G/(24.H)
GLUCOSE SERPL-MCNC: 290 MG/DL (ref 65–105)
HDLC SERPL-MCNC: 48 MG/DL (ref 35–130)
HGB UR QL STRIP: NEGATIVE
KETONES UR QL STRIP.AUTO: NEGATIVE
LDL/HDL RATIO,LDHD: 2 RATIO
LDLC SERPL CALC-MCNC: 88 MG/DL (ref 0–130)
LEUKOCYTE ESTERASE: ABNORMAL
MICROALBUMIN, URINE: 20 MG/L (ref 0–20)
NITRITE UR QL STRIP.AUTO: NEGATIVE
PH UR STRIP: 5 [PH] (ref 5–7)
POTASSIUM SERPL-SCNC: 4 MMOL/L (ref 3.6–5)
PROT SERPL-MCNC: 8.4 G/DL (ref 6.3–8.2)
PROT UR STRIP-MCNC: ABNORMAL MG/DL
RBC #/AREA URNS HPF: ABNORMAL #/HPF
SODIUM SERPL-SCNC: 143 MMOL/L (ref 137–145)
SP GR UR REFRACTOMETRY: 1.02 (ref 1–1.03)
T4 FREE SERPL-MCNC: 1.05 NG/DL (ref 0.58–2.3)
TRIGL SERPL-MCNC: 269 MG/DL (ref 0–200)
TSH SERPL DL<=0.05 MIU/L-ACNC: 2.39 UIU/ML (ref 0.34–5.6)
UROBILINOGEN UR QL STRIP.AUTO: ABNORMAL
VLDLC SERPL CALC-MCNC: 54 MG/DL
WBC URNS QL MICRO: ABNORMAL #/HPF

## 2020-02-13 RX ORDER — METHYLPREDNISOLONE ACETATE 40 MG/ML
40 INJECTION, SUSPENSION INTRA-ARTICULAR; INTRALESIONAL; INTRAMUSCULAR; SOFT TISSUE ONCE
Qty: 1 VIAL | Refills: 0
Start: 2020-02-13 | End: 2020-02-13

## 2020-02-13 NOTE — PROGRESS NOTES
Per verbal order of Dr. Rubia Neff, Depo-Medrol 40 mg/mL and 1/2 mL of 1% Lidocaine were drawn up. Consent form was filled out and signed by patient, witnessed by nurse and signed by provider. Dr. Trenton Golden injected patient's right shoulder. Patient tolerated procedure well and was monitored for 15 minutes. No side effects noted. Notified patient to call the office with any adverse reactions.      \

## 2020-02-13 NOTE — PROGRESS NOTES
Chief Complaint   Patient presents with   [address] [place] [address] Annual Wellness Visit     Visit Vitals  BP (!) 134/92 (BP 1 Location: Left arm, BP Patient Position: Sitting)   Pulse (!) 101   Temp 98.4 °F (36.9 °C) (Oral)   Resp 21   Ht 5' 4\" (1.626 m)   Wt 192 lb 12.8 oz (87.5 kg)   SpO2 96%   BMI 33.09 kg/m²     1. Have you been to the ER, urgent care clinic since your last visit? Hospitalized since your last visit? No    2. Have you seen or consulted any other health care providers outside of the [address] [place] [address] since your last visit? Include any pap smears or colon screening.  Dr. Ban Ellis

## 2020-02-13 NOTE — PATIENT INSTRUCTIONS
Body Mass Index: Care Instructions Your Care Instructions Body mass index (BMI) can help you see if your weight is raising your risk for health problems. It uses a formula to compare how much you weigh with how tall you are. · A BMI lower than 18.5 is considered underweight. · A BMI between 18.5 and 24.9 is considered healthy. · A BMI between 25 and 29.9 is considered overweight. A BMI of 30 or higher is considered obese. If your BMI is in the normal range, it means that you have a lower risk for weight-related health problems. If your BMI is in the overweight or obese range, you may be at increased risk for weight-related health problems, such as high blood pressure, heart disease, stroke, arthritis or joint pain, and diabetes. If your BMI is in the underweight range, you may be at increased risk for health problems such as fatigue, lower protection (immunity) against illness, muscle loss, bone loss, hair loss, and hormone problems. BMI is just one measure of your risk for weight-related health problems. You may be at higher risk for health problems if you are not active, you eat an unhealthy diet, or you drink too much alcohol or use tobacco products. Follow-up care is a key part of your treatment and safety. Be sure to make and go to all appointments, and call your doctor if you are having problems. It's also a good idea to know your test results and keep a list of the medicines you take. How can you care for yourself at home? · Practice healthy eating habits. This includes eating plenty of fruits, vegetables, whole grains, lean protein, and low-fat dairy. · If your doctor recommends it, get more exercise. Walking is a good choice. Bit by bit, increase the amount you walk every day. Try for at least 30 minutes on most days of the week. · Do not smoke. Smoking can increase your risk for health problems.  If you need help quitting, talk to your doctor about stop-smoking programs and medicines. These can increase your chances of quitting for good. · Limit alcohol to 2 drinks a day for men and 1 drink a day for women. Too much alcohol can cause health problems. If you have a BMI higher than 25 · Your doctor may do other tests to check your risk for weight-related health problems. This may include measuring the distance around your waist. A waist measurement of more than 40 inches in men or 35 inches in women can increase the risk of weight-related health problems. · Talk with your doctor about steps you can take to stay healthy or improve your health. You may need to make lifestyle changes to lose weight and stay healthy, such as changing your diet and getting regular exercise. If you have a BMI lower than 18.5 · Your doctor may do other tests to check your risk for health problems. · Talk with your doctor about steps you can take to stay healthy or improve your health. You may need to make lifestyle changes to gain or maintain weight and stay healthy, such as getting more healthy foods in your diet and doing exercises to build muscle. Where can you learn more? Go to http://malia-lincoln.info/. Enter S176 in the search box to learn more about \"Body Mass Index: Care Instructions. \" Current as of: March 28, 2019 Content Version: 12.2 © 5067-5255 [employer]. Care instructions adapted under license by [employer] (which disclaims liability or warranty for this information). If you have questions about a medical condition or this instruction, always ask your healthcare professional. [address] any warranty or liability for your use of this information.

## 2020-02-14 ENCOUNTER — OFFICE VISIT (OUTPATIENT)
Dept: NEUROLOGY | Age: 75
End: 2020-02-14

## 2020-02-14 VITALS
HEIGHT: 64 IN | HEART RATE: 99 BPM | OXYGEN SATURATION: 96 % | SYSTOLIC BLOOD PRESSURE: 129 MMHG | BODY MASS INDEX: 32.78 KG/M2 | WEIGHT: 192 LBS | DIASTOLIC BLOOD PRESSURE: 82 MMHG

## 2020-02-14 DIAGNOSIS — G60.9 IDIOPATHIC PERIPHERAL NEUROPATHY: Primary | ICD-10-CM

## 2020-02-14 LAB
BACTERIA UR CULT: NORMAL
BASOPHILS # BLD AUTO: 0.1 X10E3/UL (ref 0–0.2)
BASOPHILS NFR BLD AUTO: 1 %
EOSINOPHIL # BLD AUTO: 0.1 X10E3/UL (ref 0–0.4)
EOSINOPHIL NFR BLD AUTO: 2 %
ERYTHROCYTE [DISTWIDTH] IN BLOOD BY AUTOMATED COUNT: 13.3 % (ref 11.7–15.4)
HBA1C MFR BLD HPLC: 9.6 % (ref 4–5.7)
HCT VFR BLD AUTO: 48.8 % (ref 34–46.6)
HGB BLD-MCNC: 16.2 G/DL (ref 11.1–15.9)
IMM GRANULOCYTES # BLD AUTO: 0 X10E3/UL (ref 0–0.1)
IMM GRANULOCYTES NFR BLD AUTO: 0 %
LYMPHOCYTES # BLD AUTO: 1.5 X10E3/UL (ref 0.7–3.1)
LYMPHOCYTES NFR BLD AUTO: 19 %
MCH RBC QN AUTO: 32.9 PG (ref 26.6–33)
MCHC RBC AUTO-ENTMCNC: 33.2 G/DL (ref 31.5–35.7)
MCV RBC AUTO: 99 FL (ref 79–97)
MONOCYTES # BLD AUTO: 0.4 X10E3/UL (ref 0.1–0.9)
MONOCYTES NFR BLD AUTO: 5 %
NEUTROPHILS # BLD AUTO: 5.5 X10E3/UL (ref 1.4–7)
NEUTROPHILS NFR BLD AUTO: 73 %
PLATELET # BLD AUTO: 391 X10E3/UL (ref 150–450)
RBC # BLD AUTO: 4.93 X10E6/UL (ref 3.77–5.28)
WBC # BLD AUTO: 7.6 X10E3/UL (ref 3.4–10.8)

## 2020-02-14 NOTE — PROGRESS NOTES
Marked elevation of blood sugar, glycohemoglobin and triglycerides I would recommend starting insulin with Lantus 20 units daily. Continue current oral medications.

## 2020-02-16 NOTE — PROGRESS NOTES
Labs OK except extremely poor diabetes control so needs Insulin as on maximal oral of 3 meds, Start Lantus 20 Units daily and f/u with me with BS in 2-3 weeks

## 2020-02-17 ENCOUNTER — HOSPITAL ENCOUNTER (OUTPATIENT)
Dept: MRI IMAGING | Age: 75
Discharge: HOME | End: 2020-02-17
Attending: PSYCHIATRY & NEUROLOGY
Payer: MEDICARE

## 2020-02-17 DIAGNOSIS — R26.81 GAIT INSTABILITY: ICD-10-CM

## 2020-02-17 PROCEDURE — 70551 MRI BRAIN STEM W/O DYE: CPT

## 2020-02-17 RX ORDER — PEN NEEDLE, DIABETIC 30 GX3/16"
NEEDLE, DISPOSABLE MISCELLANEOUS DAILY
Qty: 1 PACKAGE | Refills: 5 | Status: SHIPPED | OUTPATIENT
Start: 2020-02-17 | End: 2020-08-10 | Stop reason: SDUPTHER

## 2020-02-17 RX ORDER — INSULIN GLARGINE 100 [IU]/ML
INJECTION, SOLUTION SUBCUTANEOUS
Qty: 1 ADJUSTABLE DOSE PRE-FILLED PEN SYRINGE | Refills: 5 | Status: SHIPPED | OUTPATIENT
Start: 2020-02-17 | End: 2020-03-18 | Stop reason: SDUPTHER

## 2020-02-17 NOTE — TELEPHONE ENCOUNTER
RX refill request from the patient/pharmacy. Patient last seen 02- with labs, and next appt. scheduled for 03-. Requested Prescriptions     Pending Prescriptions Disp Refills    insulin glargine (LANTUS,BASAGLAR) 100 unit/mL (3 mL) inpn 1 Adjustable Dose Pre-filled Pen Syringe 5     Sig: Take 20 units daily    Insulin Needles, Disposable, 31 gauge x 5/16\" ndle 1 Package 5     Sig: by SubCUTAneous route daily.  Use with Lantus flex pen daily

## 2020-02-17 NOTE — PROGRESS NOTES
Labs OK except extremely poor diabetes control so needs Insulin as on maximal oral of 3 meds, Start Lantus 20 Units daily and f/u with me with BS in 2-3 weeks. Duplicate note.

## 2020-02-17 NOTE — PROGRESS NOTES
Labs OK except extremely poor diabetes control so needs Insulin as on maximal oral of 3 meds, Start Lantus 20 Units daily and f/u with me with BS in 2-3 weeks. Discussed with patient and rx sent to patient's pharmacy.

## 2020-02-19 ENCOUNTER — HOSPITAL ENCOUNTER (OUTPATIENT)
Dept: CT IMAGING | Age: 75
Discharge: HOME | End: 2020-02-19
Attending: NURSE PRACTITIONER
Payer: MEDICARE

## 2020-02-19 DIAGNOSIS — J84.9 ILD (INTERSTITIAL LUNG DISEASE) (HCC): ICD-10-CM

## 2020-02-19 PROCEDURE — 71250 CT THORAX DX C-: CPT

## 2020-02-24 ENCOUNTER — TELEPHONE (OUTPATIENT)
Dept: NEUROLOGY | Age: 75
End: 2020-02-24

## 2020-02-27 ENCOUNTER — DOCUMENTATION ONLY (OUTPATIENT)
Dept: NEUROLOGY | Age: 75
End: 2020-02-27

## 2020-02-27 NOTE — PROGRESS NOTES
Progress note faxed to Dr. Dione Moctezuma   Note faxed to [address] [place] [address] we are unable to fill out paperwork due to disability

## 2020-03-11 NOTE — TELEPHONE ENCOUNTER
RX refill request from the patient/pharmacy. Patient last seen 02- with labs, and next appt. scheduled for 03-  Requested Prescriptions     Pending Prescriptions Disp Refills    glucose blood VI test strips (True Metrix Glucose Test Strip) strip 100 Strip PRN     Sig: USE ONCE DAILY   .

## 2020-03-13 PROBLEM — Z00.00 MEDICARE ANNUAL WELLNESS VISIT, SUBSEQUENT: Status: RESOLVED | Noted: 2020-02-12 | Resolved: 2020-03-13

## 2020-03-17 NOTE — PROGRESS NOTES
Chief Complaint   Patient presents with    Follow-up     3 week f/u       SUBJECTIVE:    Eyad Sharpe is a 70 y.o. female returns in follow-up for her diabetes as she was recently seen here with diabetes markedly out-of-control with a blood sugar of 290 and A1c of 9.6 so we added Lantus 20 units daily. She has been taking them but her blood sugars continue to run high. She does continue to take prednisone 10 mg daily per direction of her pulmonologist because we will try to decrease to 5 mg every other day she could not tolerate the decreased dose and she could not tolerate it 5 mg once daily. She does note a cough and some chest congestion now but the sputum seems to be clear and does note some head and nasal congestion. This is a new finding and different than her chronic respiratory situation from her pulmonary fibrosis. She denies any GI or  complaints include no polydipsia polyuria. She denies any headaches, fevers chills or other sequela of sinus infection. There are no other complaints on complete review of systems. Current Outpatient Medications   Medication Sig Dispense Refill    benzonatate (TESSALON) 200 mg capsule Take 200 mg by mouth three (3) times daily as needed for Cough.  azithromycin (ZITHROMAX) 250 mg tablet Take 1 Tab by mouth See Admin Instructions for 5 days. Take 2 tablets the first day, then 1 tablet daily for the next four days. 6 Tab 0    predniSONE (DELTASONE) 10 mg tablet Take 10 mg by mouth daily. 30 Tab 3    insulin glargine (LANTUS,BASAGLAR) 100 unit/mL (3 mL) inpn Take 30 units daily 1 Adjustable Dose Pre-filled Pen Syringe 5    glucose blood VI test strips (True Metrix Glucose Test Strip) strip USE ONCE DAILY 100 Strip PRN    Insulin Needles, Disposable, 31 gauge x 5/16\" ndle by SubCUTAneous route daily.  Use with Lantus flex pen daily 1 Package 5    fenofibrate nanocrystallized (TRICOR) 145 mg tablet TAKE 1 TABLET EVERY DAY 30 Tab 11    oxazepam (SERAX) 15 mg capsule TAKE 2 CAPSULES BY MOUTH NIGHTLY AT BEDTIME 180 Cap 1    pantoprazole (PROTONIX) 40 mg tablet       clemastine 2.68 mg tab tablet clemastine 2.68 mg tablet      buPROPion XL (WELLBUTRIN XL) 150 mg tablet Wellbutrin  mg 24 hr tablet, extended release      carvedilol (COREG) 6.25 mg tablet       sertraline (ZOLOFT) 100 mg tablet TAKE 1 TABLET BY MOUTH DAILY 90 Tab 3    montelukast sodium (SINGULAIR PO) Take  by mouth.  pravastatin (PRAVACHOL) 80 mg tablet Take 1 Tab by mouth nightly. 90 Tab 3    cholecalciferol (VITAMIN D3) 1,000 unit cap Take  by mouth daily. Indications: unknown of the mg.  hydroCHLOROthiazide (HYDRODIURIL) 25 mg tablet TAKE 1 TABLET EVERY DAY 90 Tab 3    clindamycin (CLINDAGEL) 1 % topical gel Apply  to affected area two (2) times a day. use thin film on affected area      B12-iodin-mag-zinc-nicole-herb193 50 mcg-75 mcg -100 mg cap Take  by mouth.  SITagliptin (JANUVIA) 100 mg tablet Take 1 Tab by mouth daily. 90 Tab prn    biotin-silicon diox-L-cysteine 3,000 mcg -100 mg-50 mg TbER Take 1 Tab by mouth daily.  azelaic acid (FINACEA) 15 % topical gel Apply  to affected area two (2) times a day.        Past Medical History:   Diagnosis Date    Abnormal LFTs 08/24/2017    FATTY LIVER    Arthritis     OSTEO    Avascular necrosis of hip ([facility] 08/24/2017    BILAT HIPS    Breast CA ([facility] 1989, 2001    BILATERAL; SURGERY, CHEMO    Chronic pain     CKD (chronic kidney disease), stage II 8/24/2017    Coagulation disorder ([facility] 1984    ITP  (DR CHU BRADSHAW) - PLATELETS DROPPED TO 5K    Depression     Diabetes ([facility]     TYPE 2; NIDDM    DJD (degenerative joint disease), multiple sites 8/24/2017    GI bleed 2008    HEMORRHOIDS    Hyperlipemia     Hypertension with renal disease 8/24/2017    IBS (irritable bowel syndrome) 8/24/2017    Ill-defined condition 2015    PNEUMONIA X2 - HOSPITALIZED IN 2015    Interstitial lung disease ([facility] 04/01/2019  Liver disease     FATTY LIVER    Myalgia 8/24/2017    On statin therapy 8/24/2017    Overactive bladder 8/24/2017    Pneumonia 04/2015    HOSPITALIZED 3 WEEKS.  Polymyalgia rheumatica ([address] 8/24/2017    Prophylactic antibiotic 8/24/2017    Reflex abnormality     acid reflex    Rosacea      Past Surgical History:   Procedure Laterality Date    BREAST SURGERY PROCEDURE UNLISTED  1993, 2002    RECONSTRUCTION X2    HX APPENDECTOMY  1950    HX BREAST BIOPSY Bilateral     HX CATARACT REMOVAL Bilateral     HX COLONOSCOPY      HX ENDOSCOPY      HX GI      COLONOSCOPY    HX HEENT      WISDOM TEETH    HX HYSTERECTOMY  2000S    HX MASTECTOMY Right 1989    HX MASTECTOMY Left 2001    HX ORTHOPAEDIC  2008    LUMBAR FUSION    HX ORTHOPAEDIC  2018    RE-DO LUMBAR FUSION, AND ADDED THORACIC FUSION    HX ORTHOPAEDIC  03/26/2019    neckectomy    HX TUBAL LIGATION  1981    HX UROLOGICAL  2000s    BLADDER SLING    TOTAL HIP ARTHROPLASTY Left 11/16/2016    ANTERIOR APPROACH, DR Gwendolyn De La Torre; (POSTOP: STANDS ONE INCH TALLER ON LEFT FOOT)    TOTAL HIP ARTHROPLASTY Right 12/2016    ANTERIOR APPROACH (DR JAIME)     Allergies   Allergen Reactions    Metformin Diarrhea    Statins-Hmg-Coa Reductase Inhibitors Myalgia     Myalgia with  multiple statins  Takes pravachol     Codeine Rash     Rash on thighs    Darvon [Propoxyphene] Other (comments)     \"I see worms\"    Pcn [Penicillins] Rash    Sulfa (Sulfonamide Antibiotics) Rash       REVIEW OF SYSTEMS:  General: negative for - chills or fever, or weight loss or gain  ENT: negative for - headaches or tinnitus. Positive ahead and nasal congestion postnasal drainage and resultant cough  Eyes: no blurred or visual changes  Neck: No stiffness or swollen nodes  Respiratory: negative for -  hemoptysis, shortness of breath or wheezing.   Positive for cough from drainage  Cardiovascular : negative for - chest pain, edema, palpitations or shortness of breath  Gastrointestinal: negative for - abdominal pain, blood in stools, heartburn or nausea/vomiting  Genito-Urinary: no dysuria, trouble voiding, or hematuria  Musculoskeletal: negative for - gait disturbance, joint pain, joint stiffness or joint swelling  Neurological: no TIA or stroke symptoms  Hematologic: no bruises, no bleeding  Lymphatic: no swollen glands  Integument: no lumps, mole changes, nail changes or rash  Endocrine:no malaise/lethargy poly uria or polydipsia or unexpected weight changes        Social History     Socioeconomic History    Marital status:      Spouse name: Not on file    Number of children: Not on file    Years of education: Not on file    Highest education level: Not on file   Tobacco Use    Smoking status: Never Smoker    Smokeless tobacco: Never Used   Substance and Sexual Activity    Alcohol use: No    Drug use: No    Sexual activity: Never     Family History   Problem Relation Age of Onset    Heart Disease Mother     Kidney Disease Mother     Lung Disease Mother         COPD    Diabetes Brother     Pacemaker Brother     Kidney Disease Brother     Hypertension Brother     Anesth Problems Neg Hx        OBJECTIVE:     Visit Vitals  BP (!) 139/95 (BP 1 Location: Left arm, BP Patient Position: Sitting)   Pulse 91   Temp 98.2 °F (36.8 °C) (Oral)   Ht 5' 4\" (1.626 m)   Wt 193 lb 1.6 oz (87.6 kg)   SpO2 97%   BMI 33.15 kg/m²     CONSTITUTIONAL:   well nourished, appears age appropriate  EYES: sclera anicteric, PERRL, EOMI  ENMT:nares clear, moist mucous membranes, pharynx clear  NECK: supple.  Thyroid normal, No JVD or bruits  RESPIRATORY: Chest: clear to ascultation and percussion, normal inspiratory effort  CARDIOVASCULAR: Heart: regular rate and rhythm no murmurs, rubs or gallops, PMI not displaced, No thrills  GASTROINTESTINAL: Abdomen: non distended, soft, non tender, bowel sounds normal  HEMATOLOGIC: no purpura, petechiae or bruising  LYMPHATIC: No lymph node enlargemant  MUSCULOSKELETAL: Extremities: no edema or active synovitis, pulse 1+   INTEGUMENT: No unusual rashes or suspicious skin lesions noted. Nails appear normal.  PERIPHERAL VASCULAR: normal pulses femoral, PT and DP  NEUROLOGIC: non-focal exam, A & O X 3  PSYCHIATRIC:, appropriate affect     ASSESSMENT:   1. Controlled type 2 diabetes mellitus with stage 2 chronic kidney disease, with long-term current use of insulin ([address]    2. Hypertension with renal disease    3. CKD (chronic kidney disease), stage II    4. Acute non-recurrent maxillary sinusitis      Impression  1. Diabetes mellitus still not controlled per her blood sugars at home so we will increase her Lantus to 30 units daily. 2.  Hypertension that is not controlled but she did not take her medicine this morning so I am not can adjust her medicines today. 3   CKD and we will see what that status is along with the blood sugar today  4. Sinusitis at this point we will treat her with Zithromax  5. Interstitial lung disease on prednisone 10 mg daily I told her to try cutting it to 10 every other day and see if she can tolerate that way  Moderate complexity decision making on this 25-minute office visit today. I will call with lab results and make further recommendations or adjustments if necessary. Follow-up scheduled again for 1 month with repeat fasting blood sugar at that time. I will see her sooner if there is a problem. PLAN:  .  Orders Placed This Encounter    METABOLIC PANEL, BASIC ([facility])    DISCONTD: predniSONE (DELTASONE) 20 mg tablet    benzonatate (TESSALON) 200 mg capsule    azithromycin (ZITHROMAX) 250 mg tablet    predniSONE (DELTASONE) 10 mg tablet    insulin glargine (LANTUS,BASAGLAR) 100 unit/mL (3 mL) inpn         ATTENTION:   This medical record was transcribed using an electronic medical records system. Although proofread, it may and can contain electronic and spelling errors.   Other human spelling and other errors may be present. Corrections may be executed at a later time. Please feel free to contact us for any clarifications as needed. Follow-up and Dispositions    · Return in about 4 weeks (around 4/15/2020). No results found for any visits on 03/18/20. Princess Ace MD    The patient verbalized understanding of the problems and plans as explained.

## 2020-03-18 ENCOUNTER — OFFICE VISIT (OUTPATIENT)
Dept: INTERNAL MEDICINE CLINIC | Age: 75
End: 2020-03-18

## 2020-03-18 VITALS
TEMPERATURE: 98.2 F | HEIGHT: 64 IN | WEIGHT: 193.1 LBS | SYSTOLIC BLOOD PRESSURE: 139 MMHG | OXYGEN SATURATION: 97 % | DIASTOLIC BLOOD PRESSURE: 95 MMHG | HEART RATE: 91 BPM | BODY MASS INDEX: 32.97 KG/M2

## 2020-03-18 DIAGNOSIS — I12.9 HYPERTENSION WITH RENAL DISEASE: ICD-10-CM

## 2020-03-18 DIAGNOSIS — E11.22 CONTROLLED TYPE 2 DIABETES MELLITUS WITH STAGE 2 CHRONIC KIDNEY DISEASE, WITH LONG-TERM CURRENT USE OF INSULIN (HCC): Primary | ICD-10-CM

## 2020-03-18 DIAGNOSIS — N18.2 CONTROLLED TYPE 2 DIABETES MELLITUS WITH STAGE 2 CHRONIC KIDNEY DISEASE, WITH LONG-TERM CURRENT USE OF INSULIN (HCC): Primary | ICD-10-CM

## 2020-03-18 DIAGNOSIS — J01.00 ACUTE NON-RECURRENT MAXILLARY SINUSITIS: ICD-10-CM

## 2020-03-18 DIAGNOSIS — N18.2 CKD (CHRONIC KIDNEY DISEASE), STAGE II: ICD-10-CM

## 2020-03-18 DIAGNOSIS — Z79.4 CONTROLLED TYPE 2 DIABETES MELLITUS WITH STAGE 2 CHRONIC KIDNEY DISEASE, WITH LONG-TERM CURRENT USE OF INSULIN (HCC): Primary | ICD-10-CM

## 2020-03-18 LAB
ANION GAP SERPL CALC-SCNC: 14 MMOL/L
BUN SERPL-MCNC: 29 MG/DL (ref 7–17)
CALCIUM SERPL-MCNC: 9.9 MG/DL (ref 8.4–10.2)
CHLORIDE SERPL-SCNC: 98 MMOL/L (ref 98–107)
CO2 SERPL-SCNC: 30 MMOL/L (ref 22–32)
CREAT SERPL-MCNC: 0.9 MG/DL (ref 0.7–1.2)
GLUCOSE SERPL-MCNC: 294 MG/DL (ref 65–105)
POTASSIUM SERPL-SCNC: 4.2 MMOL/L (ref 3.6–5)
SODIUM SERPL-SCNC: 142 MMOL/L (ref 137–145)

## 2020-03-18 RX ORDER — INSULIN GLARGINE 100 [IU]/ML
INJECTION, SOLUTION SUBCUTANEOUS
Qty: 1 ADJUSTABLE DOSE PRE-FILLED PEN SYRINGE | Refills: 5 | Status: SHIPPED | OUTPATIENT
Start: 2020-03-18 | End: 2020-08-04 | Stop reason: SDUPTHER

## 2020-03-18 RX ORDER — AZITHROMYCIN 250 MG/1
250 TABLET, FILM COATED ORAL SEE ADMIN INSTRUCTIONS
Qty: 6 TAB | Refills: 0 | Status: SHIPPED | OUTPATIENT
Start: 2020-03-18 | End: 2020-03-23

## 2020-03-18 RX ORDER — BENZONATATE 200 MG/1
200 CAPSULE ORAL
COMMUNITY
End: 2020-08-13 | Stop reason: SDUPTHER

## 2020-03-18 RX ORDER — PREDNISONE 10 MG/1
10 TABLET ORAL DAILY
Qty: 30 TAB | Refills: 3 | Status: SHIPPED | OUTPATIENT
Start: 2020-03-18 | End: 2020-07-30 | Stop reason: SDUPTHER

## 2020-03-18 RX ORDER — PREDNISONE 20 MG/1
20 TABLET ORAL DAILY
COMMUNITY
End: 2020-03-18 | Stop reason: SDUPTHER

## 2020-03-18 NOTE — PROGRESS NOTES
Chief Complaint   Patient presents with    Follow-up     3 week f/u     1. Have you been to the ER, urgent care clinic since your last visit? Hospitalized since your last visit? No    2. Have you seen or consulted any other health care providers outside of the [address] [place] [address] since your last visit? Include any pap smears or colon screening.  Yes, pulmonary associates with Dr. Sherie Aly

## 2020-03-18 NOTE — PATIENT INSTRUCTIONS

## 2020-03-24 NOTE — PROGRESS NOTES
Called and spoke to patient  Two pt identifiers confirmed  Informed patient per Dr. Weber More that blood sugar is still high and to increase insulin as discussed at last office visit. Confirmed with patient per Dr. Weber More to increase Lantus to 30 units daily. Patient verbalized understanding of information discussed  with no further questions at this time.

## 2020-04-09 DIAGNOSIS — I10 HYPERTENSION, UNSPECIFIED TYPE: ICD-10-CM

## 2020-04-10 RX ORDER — HYDROCHLOROTHIAZIDE 25 MG/1
TABLET ORAL
Qty: 90 TAB | Refills: 2 | Status: SHIPPED | OUTPATIENT
Start: 2020-04-10 | End: 2020-07-30 | Stop reason: SDUPTHER

## 2020-04-10 NOTE — TELEPHONE ENCOUNTER
PCP: Mini Jonas MD    Last appt: 3/18/2020  Future Appointments   Date Time Provider Gwendolyn Loving   4/17/2020  1:30 PM Mini Jonas MD [address]   5/5/2020 10:40 AM DO CESAR López/ Austen Richardson   5/13/2020  9:50 AM Mini Jonas MD [address]       Requested Prescriptions     Pending Prescriptions Disp Refills    hydroCHLOROthiazide (HYDRODIURIL) 25 mg tablet [Pharmacy Med Name: HYDROCHLOROTHIAZIDE  25MG] 90 Tab 2     Sig: TAKE 1 TABLET EVERY DAY FOR FLUID AND BP

## 2020-05-05 ENCOUNTER — OFFICE VISIT (OUTPATIENT)
Dept: NEUROLOGY | Age: 75
End: 2020-05-05

## 2020-05-05 VITALS — WEIGHT: 197 LBS | BODY MASS INDEX: 34.91 KG/M2 | HEIGHT: 63 IN

## 2020-05-05 DIAGNOSIS — R26.81 GAIT INSTABILITY: ICD-10-CM

## 2020-05-05 DIAGNOSIS — M48.02 CERVICAL STENOSIS OF SPINE: ICD-10-CM

## 2020-05-05 DIAGNOSIS — G62.9 NEUROPATHY: ICD-10-CM

## 2020-05-05 DIAGNOSIS — M51.9 LUMBAR DISC DISEASE: ICD-10-CM

## 2020-05-05 NOTE — PROGRESS NOTES
Melanie Rucker is a 70 y.o. female evaluated via telephone on 5/5/2020. Consent:  She and/or health care decision maker is aware that she may receive a bill for this telephone service, depending on her insurance coverage, and has provided verbal consent to proceed: yes    Documentation:  I communicated with the patient and/or health care decision maker about:  The patient is a pleasant 68-year-old female who returns to the neurology clinic at [facility] for a telephone encounter. She was seen by a previous neurologist and I first met the patient on February 6, 2020. Please see my history of present illness, examination, and treatment based plan from that day. She was noted to have worsening cognition, balance difficulties, pain, and weakness. After that visit, she did have an EMG/nerve conduction study which revealed a bilateral length dependent sensory axonal neuropathy. It was felt that her neuropathy was related to her multiple medical problems. She did have a brain MRI due to cognitive slowing. There is no evidence of a stroke but there was atrophy noted on the imaging. there was an incidental finding of a meningioma but this was an incidental finding and not related to any of her current symptoms. She was also noted to have an essential tremor but declined medication at that time of her last visit but did not wish for medication at that time        Details of this discussion including any medical advice provided:     Since her last visit, she does feel that things have been about the same. She has not noticed any new numbness, tingling, or weakness. She has had no falls. She continues to use her cane and her rolling walker. She does feel that her memory is essentially unchanged. She does realize that she is not exercising her mind as much as she was previously because she is interacting with less people during the stay at home order.   She definitely does not feel that her memory is any worse. She also feels that her tremor is unchanged. It is no worse than last visit. She does notice it when she is trying to perform intricate work. She is not interested in any medication for it at this time. She will continue to aggressively treat her medical conditions. She will return to the office once the coronavirus pandemic clears for more formal examination. I did encourage her to continue to use her rolling walker and her cane to help with her gait instability and to prevent falls. Coronavirus pandemic:  I did discuss with the patient at length the importance of social distancing and proper hygiene especially during these times. The patient is at a higher risk of having a more severe course of the disease and needs to follow all CDC recommendations. The patient acknowledges. I also did discuss with her the importance of doing mental stimulating activities daily. This can include word searches and crossword puzzles. I affirm this is a Patient Initiated Episode with a Patient who has not had a related appointment within my department in the past 7 days or scheduled within the next 24 hours.     Total Time: 12 minutes    Note: not billable if this call serves to triage the patient into an appointment for the relevant concern      [address] Guillen DO

## 2020-05-05 NOTE — PATIENT INSTRUCTIONS
A Healthy Lifestyle: Care Instructions Your Care Instructions A healthy lifestyle can help you feel good, stay at a healthy weight, and have plenty of energy for both work and play. A healthy lifestyle is something you can share with your whole family. A healthy lifestyle also can lower your risk for serious health problems, such as high blood pressure, heart disease, and diabetes. You can follow a few steps listed below to improve your health and the health of your family. Follow-up care is a key part of your treatment and safety. Be sure to make and go to all appointments, and call your doctor if you are having problems. It's also a good idea to know your test results and keep a list of the medicines you take. How can you care for yourself at home? · Do not eat too much sugar, fat, or fast foods. You can still have dessert and treats now and then. The goal is moderation. · Start small to improve your eating habits. Pay attention to portion sizes, drink less juice and soda pop, and eat more fruits and vegetables. ? Eat a healthy amount of food. A 3-ounce serving of meat, for example, is about the size of a deck of cards. Fill the rest of your plate with vegetables and whole grains. ? Limit the amount of soda and sports drinks you have every day. Drink more water when you are thirsty. ? Eat at least 5 servings of fruits and vegetables every day. It may seem like a lot, but it is not hard to reach this goal. A serving or helping is 1 piece of fruit, 1 cup of vegetables, or 2 cups of leafy, raw vegetables. Have an apple or some carrot sticks as an afternoon snack instead of a candy bar. Try to have fruits and/or vegetables at every meal. 
· Make exercise part of your daily routine. You may want to start with simple activities, such as walking, bicycling, or slow swimming. Try to be active 30 to 60 minutes every day.  You do not need to do all 30 to 60 minutes all at once. For example, you can exercise 3 times a day for 10 or 20 minutes. Moderate exercise is safe for most people, but it is always a good idea to talk to your doctor before starting an exercise program. 
· Keep moving. Matilda Jurado the lawn, work in the garden, or [employer]. Take the stairs instead of the elevator at work. · If you smoke, quit. People who smoke have an increased risk for heart attack, stroke, cancer, and other lung illnesses. Quitting is hard, but there are ways to boost your chance of quitting tobacco for good. ? Use nicotine gum, patches, or lozenges. ? Ask your doctor about stop-smoking programs and medicines. ? Keep trying. In addition to reducing your risk of diseases in the future, you will notice some benefits soon after you stop using tobacco. If you have shortness of breath or asthma symptoms, they will likely get better within a few weeks after you quit. · Limit how much alcohol you drink. Moderate amounts of alcohol (up to 2 drinks a day for men, 1 drink a day for women) are okay. But drinking too much can lead to liver problems, high blood pressure, and other health problems. Family health If you have a family, there are many things you can do together to improve your health. · Eat meals together as a family as often as possible. · Eat healthy foods. This includes fruits, vegetables, lean meats and dairy, and whole grains. · Include your family in your fitness plan. Most people think of activities such as jogging or tennis as the way to fitness, but there are many ways you and your family can be more active. Anything that makes you breathe hard and gets your heart pumping is exercise. Here are some tips: 
? Walk to do errands or to take your child to school or the bus. 
? Go for a family bike ride after dinner instead of watching TV. Where can you learn more? Go to http://malia-lincoln.info/ Enter D193 in the search box to learn more about \"A Healthy Lifestyle: Care Instructions. \" Current as of: May 28, 2019Content Version: 12.4 © 2893-4810 [employer]. Care instructions adapted under license by [employer] (which disclaims liability or warranty for this information). If you have questions about a medical condition or this instruction, always ask your healthcare professional. [address] any warranty or liability for your use of this information. Office Policies 
 
o Phone calls/patient messages: Please allow up to 24 hours for someone in the office to contact you about your call or message. Be mindful your provider may be out of the office or your message may require further review. We encourage you to use [employer] for your messages as this is a faster, more efficient way to communicate with our office 
 
o Medication Refills: 
Prescription medications require up to 48 business hours to process. We encourage you to use [employer] for your refills. For controlled medications: Please allow up to 72 business hours to process. Certain medications may require you to  a written prescription at our office. NO narcotic/controlled medications will be prescribed after 4pm Monday through Friday or on weekends 
 
o Form/Paperwork Completion: We ask that you allow 7-14 business days. You may also download your forms to [employer] to have your doctor print off.

## 2020-05-05 NOTE — LETTER
5/5/20 Patient: Kori Lopez YOB: 1945 Date of Visit: 5/5/2020 Vida Greenberg MD 
[address] [id number] VIA In Basket Dear Vida Greenberg MD, Thank you for referring Ms. Brianne Plaza to [address] [place] [address] for evaluation. My notes for this consultation are attached. If you have questions, please do not hesitate to call me. I look forward to following your patient along with you. Sincerely, Denilson Mansfield, DO

## 2020-05-12 ENCOUNTER — DOCUMENTATION ONLY (OUTPATIENT)
Dept: NEUROLOGY | Age: 75
End: 2020-05-12

## 2020-05-13 ENCOUNTER — VIRTUAL VISIT (OUTPATIENT)
Dept: INTERNAL MEDICINE CLINIC | Age: 75
End: 2020-05-13

## 2020-05-13 VITALS — WEIGHT: 194.5 LBS | OXYGEN SATURATION: 96 % | BODY MASS INDEX: 34.45 KG/M2

## 2020-05-13 DIAGNOSIS — J84.9 INTERSTITIAL LUNG DISEASE (HCC): ICD-10-CM

## 2020-05-13 DIAGNOSIS — N18.2 CONTROLLED TYPE 2 DIABETES MELLITUS WITH STAGE 2 CHRONIC KIDNEY DISEASE, WITH LONG-TERM CURRENT USE OF INSULIN (HCC): ICD-10-CM

## 2020-05-13 DIAGNOSIS — M15.9 PRIMARY OSTEOARTHRITIS INVOLVING MULTIPLE JOINTS: ICD-10-CM

## 2020-05-13 DIAGNOSIS — E11.22 CONTROLLED TYPE 2 DIABETES MELLITUS WITH STAGE 2 CHRONIC KIDNEY DISEASE, WITH LONG-TERM CURRENT USE OF INSULIN (HCC): ICD-10-CM

## 2020-05-13 DIAGNOSIS — Z79.4 CONTROLLED TYPE 2 DIABETES MELLITUS WITH STAGE 2 CHRONIC KIDNEY DISEASE, WITH LONG-TERM CURRENT USE OF INSULIN (HCC): ICD-10-CM

## 2020-05-13 DIAGNOSIS — N18.2 CKD (CHRONIC KIDNEY DISEASE), STAGE II: ICD-10-CM

## 2020-05-13 DIAGNOSIS — I12.9 HYPERTENSION WITH RENAL DISEASE: Primary | ICD-10-CM

## 2020-05-13 DIAGNOSIS — E66.09 CLASS 1 OBESITY DUE TO EXCESS CALORIES WITHOUT SERIOUS COMORBIDITY WITH BODY MASS INDEX (BMI) OF 33.0 TO 33.9 IN ADULT: ICD-10-CM

## 2020-05-13 DIAGNOSIS — E78.2 MIXED HYPERLIPIDEMIA: ICD-10-CM

## 2020-05-13 NOTE — PROGRESS NOTES
Chief Complaint   Patient presents with    Hypertension     3 month f/up    Chronic Kidney Disease    Diabetes     fbs 1    Cholesterol Problem    Abdominal Pain     1. Have you been to the ER, urgent care clinic since your last visit? Hospitalized since your last visit? Saw her neurologist Dr. Delmar Veras and order an MRI of head which was normal.    2. Have you seen or consulted any other health care providers outside of the [address] [place] [address] since your last visit? Include any pap smears or colon screening.  No

## 2020-05-13 NOTE — PROGRESS NOTES
Consent: Stefanie Ward, who was seen by synchronous (real-time) audio-video technology, and/or her healthcare decision maker, is aware that this patient-initiated, Telehealth encounter on 5/13/2020 is a billable service, with coverage as determined by her insurance carrier. She is aware that she may receive a bill and has provided verbal consent to proceed: Yes. Assessment & Plan:   Diagnoses and all orders for this visit:    1. Hypertension with renal disease    2. Controlled type 2 diabetes mellitus with stage 2 chronic kidney disease, with long-term current use of insulin ([facility] [address]    3. Mixed hyperlipidemia    4. Interstitial lung disease ([facility] [address]    5. CKD (chronic kidney disease), stage II    6. Primary osteoarthritis involving multiple joints    7. Class 1 obesity due to excess calories without serious comorbidity with body mass index (BMI) of 33.0 to 33.9 in adult          Follow-up and Dispositions    · Return in about 3 months (around 8/13/2020). I spent at least 25 minutes with this established patient, and >50% of the time was spent counseling and/or coordinating care regarding Follow-up of her hypertension, diabetes, hyperlipidemia, obesity, DJD, and other multiple medical problems. 712  Subjective:   Stefanie Ward is a 70 y.o. female who was seen for Hypertension (3 month f/up); Chronic Kidney Disease; Diabetes (fbs 228); Cholesterol Problem; and Abdominal Pain  This virtual visit is in follow-up of her hypertension, diabetes, hyperlipidemia, history of GI bleed, IBS, allergic rhinitis, interstitial lung disease, CKD stage II, obesity, anxiety with depression and other multiple medical problems. She is taking her medications and trying to follow her diet but she has not been taking the Januvia as she did not realize she should be taking that along with her insulin. Her blood sugars have been running high with the blood sugars up in the upper 100s to mid 200 range.   She denies any polyuria polydipsia or visual changes. She denies any chest pain, shortness of breath, palpitations, PND, orthopnea or other cardiorespiratory complaints. She notes no GI or  complaints. She notes no headaches, dizziness or neurologic complaints. She has no current active arthritic complaints. There are no other current complaints on complete review of systems. Prior to Admission medications    Medication Sig Start Date End Date Taking? Authorizing Provider   hydroCHLOROthiazide (HYDRODIURIL) 25 mg tablet TAKE 1 TABLET EVERY DAY FOR FLUID AND BP 4/10/20  Yes Kamilla Stanford MD   benzonatate (TESSALON) 200 mg capsule Take 200 mg by mouth three (3) times daily as needed for Cough. Yes Provider, Historical   predniSONE (DELTASONE) 10 mg tablet Take 10 mg by mouth daily. Patient taking differently: Take 10 mg by mouth. Monday, Wednesday, and Friday 3/18/20  Yes Kamilla Stanford MD   insulin glargine (LANTUS,BASAGLAR) 100 unit/mL (3 mL) inpn Take 30 units daily 3/18/20  Yes Kamilla Stanford MD   glucose blood VI test strips (True Metrix Glucose Test Strip) strip USE ONCE DAILY 3/11/20  Yes Kamilla Stanford MD   Insulin Needles, Disposable, 31 gauge x 5/16\" ndle by SubCUTAneous route daily. Use with Lantus flex pen daily 2/17/20  Yes Kamilla Stanford MD   B12-iodin-mag-zinc-nicole-herb193 50 mcg-75 mcg -100 mg cap Take  by mouth. Yes Provider, Historical   oxazepam (SERAX) 15 mg capsule TAKE 2 CAPSULES BY MOUTH NIGHTLY AT BEDTIME 12/31/19  Yes Katey Bauer MD   pantoprazole (PROTONIX) 40 mg tablet Take 40 mg by mouth daily.  11/21/19  Yes Provider, Historical   clemastine 2.68 mg tab tablet clemastine 2.68 mg tablet   Yes Provider, Historical   buPROPion XL (WELLBUTRIN XL) 150 mg tablet Wellbutrin  mg 24 hr tablet, extended release   Yes Provider, Historical   carvedilol (COREG) 6.25 mg tablet  9/21/19  Yes Provider, Historical   sertraline (ZOLOFT) 100 mg tablet TAKE 1 TABLET BY MOUTH DAILY 10/22/19  Yes Frankie Dickerson MD   montelukast sodium (SINGULAIR PO) Take  by mouth. Yes Provider, Historical   pravastatin (PRAVACHOL) 80 mg tablet Take 1 Tab by mouth nightly. 6/7/19  Yes Frankie Dickerson MD   biotin-silicon diox-L-cysteine 3,000 mcg -100 mg-50 mg TbER Take 1 Tab by mouth daily. Yes Provider, Historical   azelaic acid (FINACEA) 15 % topical gel Apply  to affected area two (2) times a day. Yes Provider, Historical   clindamycin (CLINDAGEL) 1 % topical gel Apply  to affected area two (2) times a day. use thin film on affected area   Yes Provider, Historical   fenofibrate nanocrystallized (TRICOR) 145 mg tablet TAKE 1 TABLET EVERY DAY 1/15/20   Frankie Dickerson MD   SITagliptin (JANUVIA) 100 mg tablet Take 1 Tab by mouth daily. 8/9/19   Frankie Dickerson MD   cholecalciferol (VITAMIN D3) 1,000 unit cap Take  by mouth daily. Indications: unknown of the mg.     Provider, Historical     Allergies   Allergen Reactions    Metformin Diarrhea    Statins-Hmg-Coa Reductase Inhibitors Myalgia     Myalgia with  multiple statins  Takes pravachol     Codeine Rash     Rash on thighs    Darvon [Propoxyphene] Other (comments)     \"I see worms\"    Pcn [Penicillins] Rash    Sulfa (Sulfonamide Antibiotics) Rash       Patient Active Problem List   Diagnosis Code    Controlled type 2 diabetes mellitus with stage 2 chronic kidney disease, with long-term current use of insulin ([facility]) E11.22, N18.2, Z79.4    Non-seasonal allergic rhinitis J30.89    Class 1 obesity due to excess calories without serious comorbidity with body mass index (BMI) of 33.0 to 33.9 in adult E66.09, Z68.33    Rosacea L71.9    Mixed hyperlipidemia E78.2    Anxiety F41.9    GI bleed K92.2    Overactive bladder N32.81    Polymyalgia rheumatica (HCC) M35.3    IBS (irritable bowel syndrome) K58.9    Hypertension with renal disease I12.9    CKD (chronic kidney disease), stage II N18.2    Avascular necrosis of hip ([facility] M87.059    Abnormal LFTs R94.5    Vitamin D deficiency E55.9    Recurrent depression (HCC) F33.9    Primary osteoarthritis involving multiple joints M89.49    Sleep disturbance G47.9    Lumbar disc disease M51.9    Spinal stenosis, lumbar M48.061    Dyspnea on exertion R06.00    Spinal stenosis, cervical region M48.02    Cervical stenosis of spine M48.02    Interstitial lung disease (HCC) J84.9    Acute bronchitis J20.9    Non-recurrent acute suppurative otitis media of left ear without spontaneous rupture of tympanic membrane H66.002    Impacted cerumen of right ear H61.21    Other fatigue R53.83    Gait instability R26.81    Glossitis K14.0    Cough R05    Alcohol screening Z13.39    Primary osteoarthritis of right shoulder M19.011    Acute non-recurrent maxillary sinusitis J01.00     Patient Active Problem List    Diagnosis Date Noted    Interstitial lung disease ([facility] 04/09/2019     Priority: 1 - One    Primary osteoarthritis involving multiple joints 01/12/2018     Priority: 1 - One    Hypertension with renal disease 08/24/2017     Priority: 1 - One    CKD (chronic kidney disease), stage II 08/24/2017     Priority: 1 - One    Controlled type 2 diabetes mellitus with stage 2 chronic kidney disease, with long-term current use of insulin ([facility] 04/20/2015     Priority: 1 - One    Mixed hyperlipidemia 04/20/2015     Priority: 1 - One    Vitamin D deficiency 10/02/2017     Priority: 2 - Two    IBS (irritable bowel syndrome) 08/24/2017     Priority: 2 - Two    Non-seasonal allergic rhinitis 04/20/2015     Priority: 2 - Two    Class 1 obesity due to excess calories without serious comorbidity with body mass index (BMI) of 33.0 to 33.9 in adult 04/20/2015     Priority: 2 - Two    Acute non-recurrent maxillary sinusitis 03/18/2020    Primary osteoarthritis of right shoulder 02/13/2020    Alcohol screening 02/12/2020    Glossitis 11/26/2019    Cough 11/26/2019    Other fatigue 11/13/2019    Gait instability 11/13/2019    Non-recurrent acute suppurative otitis media of left ear without spontaneous rupture of tympanic membrane 09/10/2019    Impacted cerumen of right ear 09/10/2019    Acute bronchitis 04/30/2019    Cervical stenosis of spine 03/26/2019    Spinal stenosis, cervical region 03/04/2019    Dyspnea on exertion 02/19/2019    Spinal stenosis, lumbar 07/16/2018    Lumbar disc disease 07/11/2018    Sleep disturbance 04/09/2018    Recurrent depression ([facility] [address] 01/05/2018    GI bleed 08/24/2017    Overactive bladder 08/24/2017    Polymyalgia rheumatica ([facility] [address] 08/24/2017    Avascular necrosis of hip (HCC) 08/24/2017    Abnormal LFTs 08/24/2017    Rosacea 04/20/2015    Anxiety 04/20/2015     Current Outpatient Medications   Medication Sig Dispense Refill    hydroCHLOROthiazide (HYDRODIURIL) 25 mg tablet TAKE 1 TABLET EVERY DAY FOR FLUID AND BP 90 Tab 2    benzonatate (TESSALON) 200 mg capsule Take 200 mg by mouth three (3) times daily as needed for Cough.  predniSONE (DELTASONE) 10 mg tablet Take 10 mg by mouth daily. (Patient taking differently: Take 10 mg by mouth. Monday, Wednesday, and Friday) 30 Tab 3    insulin glargine (LANTUS,BASAGLAR) 100 unit/mL (3 mL) inpn Take 30 units daily 1 Adjustable Dose Pre-filled Pen Syringe 5    glucose blood VI test strips (True Metrix Glucose Test Strip) strip USE ONCE DAILY 100 Strip PRN    Insulin Needles, Disposable, 31 gauge x 5/16\" ndle by SubCUTAneous route daily. Use with Lantus flex pen daily 1 Package 5    B12-iodin-mag-zinc-nicole-herb193 50 mcg-75 mcg -100 mg cap Take  by mouth.  oxazepam (SERAX) 15 mg capsule TAKE 2 CAPSULES BY MOUTH NIGHTLY AT BEDTIME 180 Cap 1    pantoprazole (PROTONIX) 40 mg tablet Take 40 mg by mouth daily.       clemastine 2.68 mg tab tablet clemastine 2.68 mg tablet      buPROPion XL (WELLBUTRIN XL) 150 mg tablet Wellbutrin  mg 24 hr tablet, extended release      carvedilol (COREG) 6.25 mg tablet       sertraline (ZOLOFT) 100 mg tablet TAKE 1 TABLET BY MOUTH DAILY 90 Tab 3    montelukast sodium (SINGULAIR PO) Take  by mouth.  pravastatin (PRAVACHOL) 80 mg tablet Take 1 Tab by mouth nightly. 90 Tab 3    biotin-silicon diox-L-cysteine 3,000 mcg -100 mg-50 mg TbER Take 1 Tab by mouth daily.  azelaic acid (FINACEA) 15 % topical gel Apply  to affected area two (2) times a day.  clindamycin (CLINDAGEL) 1 % topical gel Apply  to affected area two (2) times a day. use thin film on affected area      fenofibrate nanocrystallized (TRICOR) 145 mg tablet TAKE 1 TABLET EVERY DAY 30 Tab 11    SITagliptin (JANUVIA) 100 mg tablet Take 1 Tab by mouth daily. 90 Tab prn    cholecalciferol (VITAMIN D3) 1,000 unit cap Take  by mouth daily. Indications: unknown of the mg.        Allergies   Allergen Reactions    Metformin Diarrhea    Statins-Hmg-Coa Reductase Inhibitors Myalgia     Myalgia with  multiple statins  Takes pravachol     Codeine Rash     Rash on thighs    Darvon [Propoxyphene] Other (comments)     \"I see worms\"    Pcn [Penicillins] Rash    Sulfa (Sulfonamide Antibiotics) Rash     Past Medical History:   Diagnosis Date    Abnormal LFTs 08/24/2017    FATTY LIVER    Arthritis     OSTEO    Avascular necrosis of hip ([facility] [address] 08/24/2017    BILAT HIPS    Breast CA ([facility] [address] 1989, 2001    BILATERAL; SURGERY, CHEMO    Chronic pain     CKD (chronic kidney disease), stage II 8/24/2017    Coagulation disorder ([address] 1984    ITP  (DR CHU BRADSHAW) - PLATELETS DROPPED TO 5K    Depression     Diabetes ([facility] [address]     TYPE 2; NIDDM    DJD (degenerative joint disease), multiple sites 8/24/2017    GI bleed 2008    HEMORRHOIDS    Hyperlipemia     Hypertension with renal disease 8/24/2017    IBS (irritable bowel syndrome) 8/24/2017    Ill-defined condition 2015    PNEUMONIA X2 - HOSPITALIZED IN 2015    Interstitial lung disease ([facility] [address] 04/01/2019    Liver disease     FATTY LIVER  Myalgia 8/24/2017    On statin therapy 8/24/2017    Overactive bladder 8/24/2017    Pneumonia 04/2015    HOSPITALIZED 3 WEEKS.  Polymyalgia rheumatica ([facility] [address] 8/24/2017    Prophylactic antibiotic 8/24/2017    Reflex abnormality     acid reflex    Rosacea      Past Surgical History:   Procedure Laterality Date    BREAST SURGERY PROCEDURE UNLISTED  1993, 2002    RECONSTRUCTION X2    HX APPENDECTOMY  1950    HX BREAST BIOPSY Bilateral     HX CATARACT REMOVAL Bilateral     HX COLONOSCOPY      HX ENDOSCOPY      HX GI      COLONOSCOPY    HX HEENT      WISDOM TEETH    HX HYSTERECTOMY  2000S    HX MASTECTOMY Right 1989    HX MASTECTOMY Left 2001    HX ORTHOPAEDIC  2008    LUMBAR FUSION    HX ORTHOPAEDIC  2018    RE-DO LUMBAR FUSION, AND ADDED THORACIC FUSION    HX ORTHOPAEDIC  03/26/2019    neckectomy    HX TUBAL LIGATION  1981    HX UROLOGICAL  2000s    BLADDER SLING    TOTAL HIP ARTHROPLASTY Left 11/16/2016    ANTERIOR APPROACH, DR Gwendolyn De La Torre; (POSTOP: STANDS ONE INCH TALLER ON LEFT FOOT)    TOTAL HIP ARTHROPLASTY Right 12/2016    ANTERIOR APPROACH (DR Gwendolyn De La Torre)     Family History   Problem Relation Age of Onset    Heart Disease Mother     Kidney Disease Mother     Lung Disease Mother         COPD    Diabetes Brother     Pacemaker Brother     Kidney Disease Brother     Hypertension Brother     Anesth Problems Neg Hx      Social History     Tobacco Use    Smoking status: Never Smoker    Smokeless tobacco: Never Used   Substance Use Topics    Alcohol use: No       Review of Systems      General: Not Present- Anorexia, Chills, Dietary,Fatigue, Fever, Medication Changes, Night Sweats, Weight Gain > 10lbs. and Weight Loss > 10lbs. .  Skin: Not Present- Bruising and Excessive Sweating. HEENT: Not Present- Headache, Visual Loss and Vertigo.   Respiratory: Not Present- Cough, shortness of breath or dyspnea on exertion  Cardiovascular: Not Present- Chest Pain, Difficulty Breathing On Exertion, Edema, Orthopnea, Palpitations, Paroxysmal Nocturnal Dyspnea,  Shortness of Breath   Gastrointestinal: Not Present- Black, Tarry Stool, Bloody Stool, Diarrhea, abdominal pain or nausea vomiting  Genitourinary: Not presenturinary frequency, dysuria or other urinary symptoms  Musculoskeletal: Not Present-joint pain, joint swelling, muscle Pain or Muscle Weakness. Neurological: Not Present- Dizziness, new headaches or TIA symptoms  Psychiatric: Not Present- Depression or anxiety  Endocrine: Not Present- Cold Intolerance, Heat Intolerance and Thyroid Problems. Hematology: Not Present- Abnormal Bleeding or Easy Bruising.         Objective:   Vital Signs: (As obtained by patient/caregiver at home)  Visit Vitals  Wt 194 lb 8 oz (88.2 kg)   SpO2 96%   BMI 34.45 kg/m²            Constitutional: [x] Appears well-developed and well-nourished [x] No apparent distress         Mental status: [x] Alert and awake  [x] Oriented to person/place/time [x] Able to follow commands        Eyes:   EOM    [x]  Normal      Sclera  [x]  Normal              Discharge [x]  None visible       HENT: [x] Normocephalic, atraumatic    [x] Mouth/Throat: Mucous membranes are moist    External Ears [x] Normal    Neck: [x] No visualized mass    Pulmonary/Chest: [x] Respiratory effort normal   [x] No visualized signs of difficulty breathing or respiratory distress             Musculoskeletal:   [x] Normal gait with no signs of ataxia         [x] Normal range of motion of neck            Neurological:        [x] No Facial Asymmetry (Cranial nerve 7 motor function) (limited exam due to video visit)          [x] No gaze palsy                Skin:        [x] No significant exanthematous lesions or discoloration noted on facial skin                   Psychiatric:       [x] Normal Affect        [x] No Hallucinations    Other pertinent observable physical exam findings:-        We discussed the expected course, resolution and complications of the diagnosis(es) in detail. Medication risks, benefits, costs, interactions, and alternatives were discussed as indicated. I advised her to contact the office if her condition worsens, changes or fails to improve as anticipated. She expressed understanding with the diagnosis(es) and plan. ASSESSMENT:   1. Hypertension with renal disease    2. Controlled type 2 diabetes mellitus with stage 2 chronic kidney disease, with long-term current use of insulin ([facility] [address]    3. Mixed hyperlipidemia    4. Interstitial lung disease ([facility] [address]    5. CKD (chronic kidney disease), stage II    6. Primary osteoarthritis involving multiple joints    7. Class 1 obesity due to excess calories without serious comorbidity with body mass index (BMI) of 33.0 to 33.9 in adult      Impression  1. Hypertension blood pressure has not been checked but she is having no symptoms consistent with uncontrolled hypertension and her most recent blood pressures have been okay  2. Diabetes mellitus blood sugars are running high I am asked to resume her Januvia along with her insulin that they continue to run high we will have to adjust insulin upward. 3.  Hyperlipidemia reviewed prior numbers and unfortunate cannot check that virtually today. 4.  Interstitial lung disease she seems to be stable from the standpoint and thus is doing virtual visits rather than come into the office. 5.  CKD stage II reviewed prior numbers and stable  6. DJD that is stable  7. Obesity we did discuss diet, exercise and weight reduction  I will recheck a myself again in 3 months or sooner should there be a problem. We will renew her medications. PLAN:  . No orders of the defined types were placed in this encounter. ATTENTION:   This medical record was transcribed using an electronic medical records system. Although proofread, it may and can contain electronic and spelling errors. Other human spelling and other errors may be present.   Corrections may be executed at a later time. Please feel free to contact us for any clarifications as needed. Follow-up and Dispositions    · Return in about 3 months (around 8/13/2020). Abdirahman Jacobson MD    Nelson Farias is a 70 y.o. female being evaluated by a video visit encounter for concerns as above. A caregiver was present when appropriate. Due to this being a TeleHealth encounter (During XPEBI-56 public health emergency), evaluation of the following organ systems was limited: Vitals/Constitutional/EENT/Resp/CV/GI//MS/Neuro/Skin/Heme-Lymph-Imm. Pursuant to the emergency declaration under the [facility], [facility]5 waiver authority and the [employer] and [employer] Act, this Virtual  Visit was conducted, with patient's (and/or legal guardian's) consent, to reduce the patient's risk of exposure to COVID-19 and provide necessary medical care. Services were provided through a video synchronous discussion virtually to substitute for in-person clinic visit. Patient and provider were located at their individual homes.         Abdirahman Jacobson MD

## 2020-06-16 DIAGNOSIS — E78.2 MIXED HYPERLIPIDEMIA: ICD-10-CM

## 2020-06-16 RX ORDER — CARVEDILOL 6.25 MG/1
TABLET ORAL
Qty: 45 TAB | Refills: 4 | Status: SHIPPED | OUTPATIENT
Start: 2020-06-16 | End: 2020-07-30 | Stop reason: SDUPTHER

## 2020-06-16 RX ORDER — PRAVASTATIN SODIUM 80 MG/1
TABLET ORAL
Qty: 90 TAB | Refills: 2 | Status: SHIPPED | OUTPATIENT
Start: 2020-06-16 | End: 2020-07-30 | Stop reason: SDUPTHER

## 2020-06-16 NOTE — TELEPHONE ENCOUNTER
RX refill request from the patient/pharmacy.  Patient last seen 05- with labs, and next appt. scheduled for 08-  Requested Prescriptions     Pending Prescriptions Disp Refills    pravastatin (PRAVACHOL) 80 mg tablet [Pharmacy Med Name: *PRAVASTATIN 80MG] 90 Tab 2     Sig: T1T PO EVERY NIGHT FOR CHOLESTEROL    carvediloL (COREG) 6.25 mg tablet [Pharmacy Med Name: CARVEDILOL  6.25MG] 45 Tab 4     Sig: T 1/2 TAB QD   .

## 2020-07-30 DIAGNOSIS — I10 HYPERTENSION, UNSPECIFIED TYPE: ICD-10-CM

## 2020-07-30 DIAGNOSIS — F41.9 ANXIETY: ICD-10-CM

## 2020-07-30 DIAGNOSIS — F32.A DEPRESSION, UNSPECIFIED DEPRESSION TYPE: ICD-10-CM

## 2020-07-30 DIAGNOSIS — E78.2 MIXED HYPERLIPIDEMIA: ICD-10-CM

## 2020-07-30 RX ORDER — SERTRALINE HYDROCHLORIDE 100 MG/1
TABLET, FILM COATED ORAL
Qty: 90 TAB | Refills: 3 | Status: SHIPPED | OUTPATIENT
Start: 2020-07-30 | End: 2020-08-13 | Stop reason: SDUPTHER

## 2020-07-30 RX ORDER — PREDNISONE 10 MG/1
10 TABLET ORAL
Qty: 90 TAB | Refills: 0 | Status: SHIPPED | OUTPATIENT
Start: 2020-07-31 | End: 2020-08-13 | Stop reason: SDUPTHER

## 2020-07-30 RX ORDER — CARVEDILOL 6.25 MG/1
TABLET ORAL
Qty: 45 TAB | Refills: 4 | Status: SHIPPED | OUTPATIENT
Start: 2020-07-30 | End: 2020-08-13 | Stop reason: SDUPTHER

## 2020-07-30 RX ORDER — OXAZEPAM 15 MG/1
CAPSULE ORAL
Qty: 180 CAP | Refills: 1 | Status: SHIPPED | OUTPATIENT
Start: 2020-07-30 | End: 2020-08-13 | Stop reason: SDUPTHER

## 2020-07-30 RX ORDER — HYDROCHLOROTHIAZIDE 25 MG/1
TABLET ORAL
Qty: 90 TAB | Refills: 2 | Status: SHIPPED | OUTPATIENT
Start: 2020-07-30 | End: 2021-04-05

## 2020-07-30 RX ORDER — BUPROPION HYDROCHLORIDE 150 MG/1
TABLET ORAL
Qty: 90 TAB | Refills: 0 | Status: SHIPPED | OUTPATIENT
Start: 2020-07-30 | End: 2020-08-04 | Stop reason: SDUPTHER

## 2020-07-30 RX ORDER — PRAVASTATIN SODIUM 80 MG/1
TABLET ORAL
Qty: 90 TAB | Refills: 2 | Status: SHIPPED | OUTPATIENT
Start: 2020-07-30 | End: 2020-08-13 | Stop reason: SDUPTHER

## 2020-07-30 NOTE — TELEPHONE ENCOUNTER
PCP: Edin Balderas MD    Last appt: 5/13/2020  Future Appointments   Date Time Provider Gwendolyn Loving   8/13/2020  9:40 AM Edin Balderas MD [facility] BS AMB   9/3/2020 11:20 AM Zamzam Mansfield DO [facility] BS AMB       Requested Prescriptions     Pending Prescriptions Disp Refills    SITagliptin (JANUVIA) 100 mg tablet 90 Tab 3     Sig: Take 1 Tab by mouth daily.  predniSONE (DELTASONE) 10 mg tablet 90 Tab 0     Sig: Take 10 mg by mouth every Monday, Wednesday, Friday.  Monday, Wednesday, and Friday    buPROPion XL (Wellbutrin XL) 150 mg tablet 90 Tab 0     Sig: Wellbutrin  mg 24 hr tablet, extended release    oxazepam (SERAX) 15 mg capsule 180 Cap 1     Sig: TAKE 2 CAPSULES BY MOUTH NIGHTLY AT BEDTIME    hydroCHLOROthiazide (HYDRODIURIL) 25 mg tablet 90 Tab 2     Sig: TAKE 1 TABLET EVERY DAY FOR FLUID AND BP    carvediloL (COREG) 6.25 mg tablet 45 Tab 4     Sig: T 1/2 TAB QD    pravastatin (PRAVACHOL) 80 mg tablet 90 Tab 2     Sig: T1T PO EVERY NIGHT FOR CHOLESTEROL    sertraline (ZOLOFT) 100 mg tablet 90 Tab 3

## 2020-08-04 RX ORDER — BUPROPION HYDROCHLORIDE 150 MG/1
TABLET ORAL
Qty: 90 TAB | Refills: 3 | Status: SHIPPED | OUTPATIENT
Start: 2020-08-04 | End: 2020-08-13 | Stop reason: SDUPTHER

## 2020-08-04 RX ORDER — INSULIN GLARGINE 100 [IU]/ML
INJECTION, SOLUTION SUBCUTANEOUS
Qty: 5 ADJUSTABLE DOSE PRE-FILLED PEN SYRINGE | Refills: 3 | Status: SHIPPED | OUTPATIENT
Start: 2020-08-04 | End: 2021-04-02 | Stop reason: ALTCHOICE

## 2020-08-04 NOTE — TELEPHONE ENCOUNTER
RX refill request from the patient/pharmacy. Patient last seen 07- with labs, and next appt. scheduled for 08-  Requested Prescriptions     Pending Prescriptions Disp Refills    buPROPion XL (Wellbutrin XL) 150 mg tablet 90 Tab 3     Sig: Wellbutrin  mg 24 hr tablet, extended release    insulin glargine (LANTUS,BASAGLAR) 100 unit/mL (3 mL) inpn 5 Adjustable Dose Pre-filled Pen Syringe 3     Sig: Take 30 units daily   .

## 2020-08-10 RX ORDER — PEN NEEDLE, DIABETIC 30 GX3/16"
NEEDLE, DISPOSABLE MISCELLANEOUS DAILY
Qty: 1 PACKAGE | Refills: 3 | Status: SHIPPED | OUTPATIENT
Start: 2020-08-10 | End: 2020-08-13 | Stop reason: SDUPTHER

## 2020-08-10 RX ORDER — GLIMEPIRIDE 4 MG/1
4 TABLET ORAL
Qty: 90 TAB | Refills: 3 | Status: SHIPPED | OUTPATIENT
Start: 2020-08-10 | End: 2020-11-23 | Stop reason: SDUPTHER

## 2020-08-10 NOTE — TELEPHONE ENCOUNTER
RX refill request from the patient/pharmacy. Patient last seen 05- with labs, and next appt. scheduled for 08-  Requested Prescriptions     Pending Prescriptions Disp Refills    Insulin Needles, Disposable, 31 gauge x 5/16\" ndle 1 Package 3     Sig: by SubCUTAneous route daily. Use with Lantus flex pen daily    glimepiride (AMARYL) 4 mg tablet 90 Tab 3     Sig: Take 1 Tab by mouth every morning. Jenn Merchant

## 2020-08-12 NOTE — PROGRESS NOTES
Chief Complaint   Patient presents with    Hypertension     3 month follow up    Diabetes    Cholesterol Problem       SUBJECTIVE:    Enio Angeles is a 70 y.o. female who returns today in follow-up of her medical problems include hypertension, diabetes, hyperlipidemia, IBS, allergic rhinitis, DJD with history of hip avascular necrosis, CKD stage II, obesity, anxiety, depression, and other medical problems. In addition to her chronic problems she is got an issue with a tremor that has increased and she did see a neurologist who recommended adding a medicine to help although when she saw her oncologist Dr. Shorty Jasso she had suggested that adjusting the beta-blocker dose may be beneficial and to discuss it with me. She also has questions regarding her medications as apparently she has not been taking her glimepiride nor has she been taking her TriCor. She has been taking her pravastatin, Januvia and her Lantus insulin. She is taking the other medications which are reviewed in detail with her. She does need multiple prescriptions refilled which is done. She currently denies any chest pain, shortness of breath, palpitations, PND, orthopnea or other cardiorespiratory complaints. She notes no GI or  complaints. She notes no headaches, dizziness or neurologic complaints except for the tremor. She does have a lot of chronic arthritic complaints but they have not changed. There are no other specific complaints on complete review of systems. Current Outpatient Medications   Medication Sig Dispense Refill    pravastatin (PRAVACHOL) 80 mg tablet T1T PO EVERY NIGHT FOR CHOLESTEROL 90 Tab 2    buPROPion XL (Wellbutrin XL) 150 mg tablet Wellbutrin  mg 24 hr tablet, extended release 90 Tab 3    Insulin Needles, Disposable, 31 gauge x 5/16\" ndle by SubCUTAneous route daily.  Use with Lantus flex pen daily 1 Package 3    oxazepam (SERAX) 15 mg capsule TAKE 2 CAPSULES BY MOUTH NIGHTLY AT BEDTIME 180 Cap 1  sertraline (ZOLOFT) 100 mg tablet 1 tab QD 90 Tab 3    benzonatate (TESSALON) 200 mg capsule Take 1 Cap by mouth three (3) times daily as needed for Cough. 90 Cap 2    glucose blood VI test strips (True Metrix Glucose Test Strip) strip USE ONCE DAILY 100 Strip PRN    clindamycin (CLINDAGEL) 1 % topical gel Apply  to affected area two (2) times a day. use thin film on affected area 1 mL 2    predniSONE (DELTASONE) 10 mg tablet 10 mg  Tab 0    mupirocin (BACTROBAN) 2 % ointment Apply  to affected area three (3) times daily. 22 g 1    clindamycin (CLEOCIN) 300 mg capsule Take 2 capsules 1 hour before dental procedure 10 Cap 0    carvediloL (COREG) 6.25 mg tablet Take 1 tablet twice daily 180 Tab 3    glimepiride (AMARYL) 4 mg tablet Take 1 Tab by mouth every morning. 90 Tab 3    insulin glargine (LANTUS,BASAGLAR) 100 unit/mL (3 mL) inpn Take 30 units daily 5 Adjustable Dose Pre-filled Pen Syringe 3    SITagliptin (JANUVIA) 100 mg tablet Take 1 Tab by mouth daily. 90 Tab 3    hydroCHLOROthiazide (HYDRODIURIL) 25 mg tablet TAKE 1 TABLET EVERY DAY FOR FLUID AND BP 90 Tab 2    B12-iodin-mag-zinc-nicole-herb193 50 mcg-75 mcg -100 mg cap Take  by mouth.  fenofibrate nanocrystallized (TRICOR) 145 mg tablet TAKE 1 TABLET EVERY DAY 30 Tab 11    pantoprazole (PROTONIX) 40 mg tablet Take 40 mg by mouth daily.  montelukast sodium (SINGULAIR PO) Take  by mouth.  cholecalciferol (VITAMIN D3) 1,000 unit cap Take  by mouth daily. Indications: unknown of the mg.  biotin-silicon diox-L-cysteine 3,000 mcg -100 mg-50 mg TbER Take 1 Tab by mouth daily.        Past Medical History:   Diagnosis Date    Abnormal LFTs 08/24/2017    FATTY LIVER    Arthritis     OSTEO    Avascular necrosis of hip ([facility] [address] 08/24/2017    BILAT HIPS    Breast CA ([facility] [address] 1989, 2001    BILATERAL; SURGERY, CHEMO    Chronic pain     CKD (chronic kidney disease), stage II 8/24/2017    Coagulation disorder ([facility] [address] 1984    ITP  ( [facility]) - PLATELETS DROPPED TO 5K    Depression     Diabetes ([facility] [address]     TYPE 2; NIDDM    DJD (degenerative joint disease), multiple sites 8/24/2017    GI bleed 2008    HEMORRHOIDS    Hyperlipemia     Hypertension with renal disease 8/24/2017    IBS (irritable bowel syndrome) 8/24/2017    Ill-defined condition 2015    PNEUMONIA X2 - HOSPITALIZED IN 2015    Interstitial lung disease ([facility] [address] 04/01/2019    Liver disease     FATTY LIVER    Myalgia 8/24/2017    On statin therapy 8/24/2017    Overactive bladder 8/24/2017    Pneumonia 04/2015    HOSPITALIZED 3 WEEKS.     Polymyalgia rheumatica ([facility] [address] 8/24/2017    Prophylactic antibiotic 8/24/2017    Reflex abnormality     acid reflex    Rosacea      Past Surgical History:   Procedure Laterality Date    BREAST SURGERY PROCEDURE UNLISTED  1993, 2002    RECONSTRUCTION X2    HX APPENDECTOMY  1950    HX BREAST BIOPSY Bilateral     HX CATARACT REMOVAL Bilateral     HX COLONOSCOPY      HX ENDOSCOPY      HX GI      COLONOSCOPY    HX HEENT      WISDOM TEETH    HX HYSTERECTOMY  2000S    HX MASTECTOMY Right 1989    HX MASTECTOMY Left 2001    HX ORTHOPAEDIC  2008    LUMBAR FUSION    HX ORTHOPAEDIC  2018    RE-DO LUMBAR FUSION, AND ADDED THORACIC FUSION    HX ORTHOPAEDIC  03/26/2019    neckectomy    HX TUBAL LIGATION  1981    HX UROLOGICAL  2000s    BLADDER SLING    TOTAL HIP ARTHROPLASTY Left 11/16/2016    ANTERIOR APPROACH, DR Gwendolyn De La Torre; (POSTOP: STANDS ONE INCH TALLER ON LEFT FOOT)    TOTAL HIP ARTHROPLASTY Right 12/2016    ANTERIOR APPROACH (DR JAIME)     Allergies   Allergen Reactions    Metformin Diarrhea    Statins-Hmg-Coa Reductase Inhibitors Myalgia     Myalgia with  multiple statins  Takes pravachol     Codeine Rash     Rash on thighs    Darvon [Propoxyphene] Other (comments)     \"I see worms\"    Pcn [Penicillins] Rash    Sulfa (Sulfonamide Antibiotics) Rash       REVIEW OF SYSTEMS:  General: negative for - chills or fever, or weight loss or gain  ENT: negative for - headaches, nasal congestion or tinnitus  Eyes: no blurred or visual changes  Neck: No stiffness or swollen nodes  Respiratory: negative for - cough, hemoptysis, shortness of breath or wheezing  Cardiovascular : negative for - chest pain, edema, palpitations or shortness of breath  Gastrointestinal: negative for - abdominal pain, blood in stools, heartburn or nausea/vomiting  Genito-Urinary: no dysuria, trouble voiding, or hematuria  Musculoskeletal: negative for - gait disturbance, joint pain, joint stiffness or joint swelling  Neurological: no TIA or stroke symptoms. Tremor is sometimes worse than other times  Hematologic: no bruises, no bleeding  Lymphatic: no swollen glands  Integument: no lumps, mole changes, nail changes or rash  Endocrine:no malaise/lethargy poly uria or polydipsia or unexpected weight changes        Social History     Socioeconomic History    Marital status:      Spouse name: Not on file    Number of children: Not on file    Years of education: Not on file    Highest education level: Not on file   Tobacco Use    Smoking status: Never Smoker    Smokeless tobacco: Never Used   Substance and Sexual Activity    Alcohol use: No    Drug use: No    Sexual activity: Never     Family History   Problem Relation Age of Onset    Heart Disease Mother     Kidney Disease Mother     Lung Disease Mother         COPD    Diabetes Brother     Pacemaker Brother     Kidney Disease Brother     Hypertension Brother     Anesth Problems Neg Hx        OBJECTIVE:     Visit Vitals  BP (!) 158/96 (BP 1 Location: Left arm, BP Patient Position: Sitting)   Pulse 93   Temp 98 °F (36.7 °C) (Oral)   Resp 21   Ht 5' 4\" (1.626 m)   Wt 194 lb 3.2 oz (88.1 kg)   SpO2 95%   BMI 33.33 kg/m²     CONSTITUTIONAL:   well nourished, appears age appropriate  EYES: sclera anicteric, PERRL, EOMI  ENMT:nares clear, moist mucous membranes, pharynx clear  NECK: supple.  Thyroid normal, No JVD or bruits  RESPIRATORY: Chest: clear to ascultation and percussion, normal inspiratory effort  CARDIOVASCULAR: Heart: regular rate and rhythm no murmurs, rubs or gallops, PMI not displaced, No thrills, no peripheral edema  GASTROINTESTINAL: Abdomen: non distended, soft, non tender, bowel sounds normal  HEMATOLOGIC: no purpura, petechiae or bruising  LYMPHATIC: No lymph node enlargemant  MUSCULOSKELETAL: Extremities: no active synovitis, pulse 1+   INTEGUMENT: No unusual rashes or suspicious skin lesions noted. Nails appear normal.  PERIPHERAL VASCULAR: normal pulses femoral, PT and DP  NEUROLOGIC: non-focal exam, A & O X 3. Mild resting tremor  PSYCHIATRIC:, appropriate affect     ASSESSMENT:   1. Hypertension with renal disease    2. Controlled type 2 diabetes mellitus with stage 2 chronic kidney disease, with long-term current use of insulin ([facility] [address]    3. Mixed hyperlipidemia    4. Interstitial lung disease ([facility] [address]    5. Primary osteoarthritis involving multiple joints    6. CKD (chronic kidney disease), stage II    7. Class 1 obesity due to excess calories without serious comorbidity with body mass index (BMI) of 33.0 to 33.9 in adult    8. Anxiety    9. Depression, unspecified depression type    10. Tremor      Impression  1. Hypertension that is not controlled so at this point I am increasing her Coreg which she currently takes 3.125 daily I am increasing to 6.25 twice daily and this may indeed help her tremor as well as her hypertension. 2.  Diabetes which I doubt is controlled as her last A1c in February was 9.6 and she has not been taking her glimepiride. I will see with the days number looks like but I suspect we need to resume her glimepiride and continue her Lantus and Januvia.   3.  Hyperlipidemia and review of her last numbers from February triglycerides were elevated and HDL at 48 was low for female and TriCor probably help both of these numbers which she has not been taking but will see with today's numbers look like. She will continue Pravachol. 4.  Interstitial lung disease currently not wearing oxygen today and seems to be doing relatively well  5. DJD that is stable  6. CKD stage II repeat status pending  7. Obesity weight is 194 today which is down 3 pounds from May. Note that her prednisone dose is currently at 10 twice daily which certainly may contribute to weight gain. 8.  Anxiety that is stable medicines reviewed  9. Depression that is currently stable and taking Wellbutrin  10. Tremor hopefully that will be improved by addition or correction of dose of Coreg to 6.25 twice daily  All of medications reviewed in detail and all of her medications were renewed. I will call with lab results and make further recommendations or adjustments if necessary. Follow-up scheduled for 3 months or sooner should I need to. 45 minutes spent in direct care of this patient today with greater than 50% in counseling coordination of care. PLAN:  .  Orders Placed This Encounter    METABOLIC PANEL, COMPREHENSIVE ([facility])    LIPID PANEL ([facility])    CK ([facility])    HEMOGLOBIN A1C W/O EAG ([facility])    DISCONTD: carvediloL (COREG) 6.25 mg tablet    pravastatin (PRAVACHOL) 80 mg tablet    buPROPion XL (Wellbutrin XL) 150 mg tablet    Insulin Needles, Disposable, 31 gauge x 5/16\" ndle    oxazepam (SERAX) 15 mg capsule    sertraline (ZOLOFT) 100 mg tablet    benzonatate (TESSALON) 200 mg capsule    glucose blood VI test strips (True Metrix Glucose Test Strip) strip    clindamycin (CLINDAGEL) 1 % topical gel    predniSONE (DELTASONE) 10 mg tablet    mupirocin (BACTROBAN) 2 % ointment    clindamycin (CLEOCIN) 300 mg capsule    carvediloL (COREG) 6.25 mg tablet         ATTENTION:   This medical record was transcribed using an electronic medical records system. Although proofread, it may and can contain electronic and spelling errors.   Other human spelling and other errors may be present. Corrections may be executed at a later time. Please feel free to contact us for any clarifications as needed. Follow-up and Dispositions    · Return in about 3 months (around 11/13/2020). Results for orders placed or performed in visit on 87/77/59   METABOLIC PANEL, COMPREHENSIVE   Result Value Ref Range    Glucose 231 (H) 65 - 105 mg/dL    BUN 28.0 (H) 7.0 - 17.0 mg/dL    Creatinine 0.9 0.7 - 1.2 mg/dL    Sodium 142 137 - 145 mmol/L    Potassium 4.6 3.6 - 5.0 mmol/L    Chloride 101 98 - 107 mmol/L    CO2 28.0 22.0 - 32.0 mmol/L    Calcium 10.4 (H) 8.4 - 10.2 mg/dl    Protein, total 7.8 6.3 - 8.2 g/dL    Albumin 4.6 3.9 - 5.4 g/dL    ALT (SGPT) 115 (H) 0 - 35 U/L    AST (SGOT) 85.0 (H) 14.0 - 36.0 U/L    Alk. phosphatase 118 38 - 126 U/L    Bilirubin, total 0.7 0.2 - 1.3 mg/dL    BUN/Creatinine ratio 31 Ratio    GFR est AA >60 mL/min/1.73m2    GFR est non-AA >60 mL/min/1.73m2    Globulin 3.20     A-G Ratio 1.4 Ratio    Anion gap 13 mmol/L   LIPID PANEL   Result Value Ref Range    Cholesterol, total 206 (H) 0 - 200 mg/dL    Triglyceride 177 0 - 200 mg/dL    HDL Cholesterol 85 35 - 130 mg/dL    VLDL 35 mg/dL    LDL, calculated 86 0 - 130 mg/dL    CHOL/HDL Ratio 2 0 - 4 Ratio    LDL/HDL Ratio 1 Ratio   CK   Result Value Ref Range    CK 33.00 30.00 - 135.00 U/L   HEMOGLOBIN A1C W/O EAG   Result Value Ref Range    Hemoglobin A1c 10.4 (H) 4.0 - 5.7 %       Evans Tompkins MD    The patient verbalized understanding of the problems and plans as explained.

## 2020-08-13 ENCOUNTER — OFFICE VISIT (OUTPATIENT)
Dept: INTERNAL MEDICINE CLINIC | Age: 75
End: 2020-08-13
Payer: MEDICARE

## 2020-08-13 VITALS
OXYGEN SATURATION: 95 % | RESPIRATION RATE: 21 BRPM | SYSTOLIC BLOOD PRESSURE: 158 MMHG | BODY MASS INDEX: 33.16 KG/M2 | TEMPERATURE: 98 F | WEIGHT: 194.2 LBS | HEIGHT: 64 IN | DIASTOLIC BLOOD PRESSURE: 96 MMHG | HEART RATE: 93 BPM

## 2020-08-13 DIAGNOSIS — F41.9 ANXIETY: ICD-10-CM

## 2020-08-13 DIAGNOSIS — E11.22 CONTROLLED TYPE 2 DIABETES MELLITUS WITH STAGE 2 CHRONIC KIDNEY DISEASE, WITH LONG-TERM CURRENT USE OF INSULIN (HCC): ICD-10-CM

## 2020-08-13 DIAGNOSIS — R25.1 TREMOR: ICD-10-CM

## 2020-08-13 DIAGNOSIS — N18.2 CKD (CHRONIC KIDNEY DISEASE), STAGE II: ICD-10-CM

## 2020-08-13 DIAGNOSIS — N18.2 CONTROLLED TYPE 2 DIABETES MELLITUS WITH STAGE 2 CHRONIC KIDNEY DISEASE, WITH LONG-TERM CURRENT USE OF INSULIN (HCC): ICD-10-CM

## 2020-08-13 DIAGNOSIS — M15.9 PRIMARY OSTEOARTHRITIS INVOLVING MULTIPLE JOINTS: ICD-10-CM

## 2020-08-13 DIAGNOSIS — J84.9 INTERSTITIAL LUNG DISEASE (HCC): ICD-10-CM

## 2020-08-13 DIAGNOSIS — I12.9 HYPERTENSION WITH RENAL DISEASE: Primary | ICD-10-CM

## 2020-08-13 DIAGNOSIS — Z79.4 CONTROLLED TYPE 2 DIABETES MELLITUS WITH STAGE 2 CHRONIC KIDNEY DISEASE, WITH LONG-TERM CURRENT USE OF INSULIN (HCC): ICD-10-CM

## 2020-08-13 DIAGNOSIS — E66.09 CLASS 1 OBESITY DUE TO EXCESS CALORIES WITHOUT SERIOUS COMORBIDITY WITH BODY MASS INDEX (BMI) OF 33.0 TO 33.9 IN ADULT: ICD-10-CM

## 2020-08-13 DIAGNOSIS — E78.2 MIXED HYPERLIPIDEMIA: ICD-10-CM

## 2020-08-13 DIAGNOSIS — F32.A DEPRESSION, UNSPECIFIED DEPRESSION TYPE: ICD-10-CM

## 2020-08-13 LAB
A-G RATIO,AGRAT: 1.4 RATIO
ALBUMIN SERPL-MCNC: 4.6 G/DL (ref 3.9–5.4)
ALP SERPL-CCNC: 118 U/L (ref 38–126)
ALT SERPL-CCNC: 115 U/L (ref 0–35)
ANION GAP SERPL CALC-SCNC: 13 MMOL/L
AST SERPL W P-5'-P-CCNC: 85 U/L (ref 14–36)
BILIRUB SERPL-MCNC: 0.7 MG/DL (ref 0.2–1.3)
BUN SERPL-MCNC: 28 MG/DL (ref 7–17)
BUN/CREATININE RATIO,BUCR: 31 RATIO
CALCIUM SERPL-MCNC: 10.4 MG/DL (ref 8.4–10.2)
CHLORIDE SERPL-SCNC: 101 MMOL/L (ref 98–107)
CHOL/HDL RATIO,CHHD: 2 RATIO (ref 0–4)
CHOLEST SERPL-MCNC: 206 MG/DL (ref 0–200)
CK SERPL-CCNC: 33 U/L (ref 30–135)
CO2 SERPL-SCNC: 28 MMOL/L (ref 22–32)
CREAT SERPL-MCNC: 0.9 MG/DL (ref 0.7–1.2)
GLOBULIN,GLOB: 3.2
GLUCOSE SERPL-MCNC: 231 MG/DL (ref 65–105)
HBA1C MFR BLD HPLC: 10.4 % (ref 4–5.7)
HDLC SERPL-MCNC: 85 MG/DL (ref 35–130)
LDL/HDL RATIO,LDHD: 1 RATIO
LDLC SERPL CALC-MCNC: 86 MG/DL (ref 0–130)
POTASSIUM SERPL-SCNC: 4.6 MMOL/L (ref 3.6–5)
PROT SERPL-MCNC: 7.8 G/DL (ref 6.3–8.2)
SODIUM SERPL-SCNC: 142 MMOL/L (ref 137–145)
TRIGL SERPL-MCNC: 177 MG/DL (ref 0–200)
VLDLC SERPL CALC-MCNC: 35 MG/DL

## 2020-08-13 PROCEDURE — 2022F DILAT RTA XM EVC RTNOPTHY: CPT | Performed by: INTERNAL MEDICINE

## 2020-08-13 PROCEDURE — G8536 NO DOC ELDER MAL SCRN: HCPCS | Performed by: INTERNAL MEDICINE

## 2020-08-13 PROCEDURE — 3017F COLORECTAL CA SCREEN DOC REV: CPT | Performed by: INTERNAL MEDICINE

## 2020-08-13 PROCEDURE — 1101F PT FALLS ASSESS-DOCD LE1/YR: CPT | Performed by: INTERNAL MEDICINE

## 2020-08-13 PROCEDURE — 82550 ASSAY OF CK (CPK): CPT | Performed by: INTERNAL MEDICINE

## 2020-08-13 PROCEDURE — G8753 SYS BP > OR = 140: HCPCS | Performed by: INTERNAL MEDICINE

## 2020-08-13 PROCEDURE — 83036 HEMOGLOBIN GLYCOSYLATED A1C: CPT | Performed by: INTERNAL MEDICINE

## 2020-08-13 PROCEDURE — 1090F PRES/ABSN URINE INCON ASSESS: CPT | Performed by: INTERNAL MEDICINE

## 2020-08-13 PROCEDURE — G8399 PT W/DXA RESULTS DOCUMENT: HCPCS | Performed by: INTERNAL MEDICINE

## 2020-08-13 PROCEDURE — 80061 LIPID PANEL: CPT | Performed by: INTERNAL MEDICINE

## 2020-08-13 PROCEDURE — G8427 DOCREV CUR MEDS BY ELIG CLIN: HCPCS | Performed by: INTERNAL MEDICINE

## 2020-08-13 PROCEDURE — G9708 BILAT MAST/HX BI /UNILAT MAS: HCPCS | Performed by: INTERNAL MEDICINE

## 2020-08-13 PROCEDURE — 80053 COMPREHEN METABOLIC PANEL: CPT | Performed by: INTERNAL MEDICINE

## 2020-08-13 PROCEDURE — 3046F HEMOGLOBIN A1C LEVEL >9.0%: CPT | Performed by: INTERNAL MEDICINE

## 2020-08-13 PROCEDURE — 99215 OFFICE O/P EST HI 40 MIN: CPT | Performed by: INTERNAL MEDICINE

## 2020-08-13 PROCEDURE — G8755 DIAS BP > OR = 90: HCPCS | Performed by: INTERNAL MEDICINE

## 2020-08-13 PROCEDURE — G8417 CALC BMI ABV UP PARAM F/U: HCPCS | Performed by: INTERNAL MEDICINE

## 2020-08-13 PROCEDURE — G9717 DOC PT DX DEP/BP F/U NT REQ: HCPCS | Performed by: INTERNAL MEDICINE

## 2020-08-13 RX ORDER — CALCIUM CITRATE/VITAMIN D3 200MG-6.25
TABLET ORAL
Qty: 100 STRIP | Status: SHIPPED | OUTPATIENT
Start: 2020-08-13 | End: 2021-03-23

## 2020-08-13 RX ORDER — CLINDAMYCIN HYDROCHLORIDE 300 MG/1
CAPSULE ORAL
Qty: 10 CAP | Refills: 0 | Status: SHIPPED | OUTPATIENT
Start: 2020-08-13 | End: 2022-08-12

## 2020-08-13 RX ORDER — BENZONATATE 200 MG/1
200 CAPSULE ORAL
Qty: 90 CAP | Refills: 2 | Status: SHIPPED | OUTPATIENT
Start: 2020-08-13 | End: 2021-02-05

## 2020-08-13 RX ORDER — CLINDAMYCIN PHOSPHATE 10 MG/G
GEL TOPICAL 2 TIMES DAILY
Qty: 1 ML | Refills: 2 | Status: SHIPPED | OUTPATIENT
Start: 2020-08-13 | End: 2021-04-02 | Stop reason: ALTCHOICE

## 2020-08-13 RX ORDER — SERTRALINE HYDROCHLORIDE 100 MG/1
TABLET, FILM COATED ORAL
Qty: 90 TAB | Refills: 3 | Status: SHIPPED | OUTPATIENT
Start: 2020-08-13 | End: 2021-08-02

## 2020-08-13 RX ORDER — BUPROPION HYDROCHLORIDE 150 MG/1
TABLET ORAL
Qty: 90 TAB | Refills: 3 | Status: SHIPPED | OUTPATIENT
Start: 2020-08-13 | End: 2021-09-14

## 2020-08-13 RX ORDER — CARVEDILOL 6.25 MG/1
TABLET ORAL
Qty: 45 TAB | Refills: 4 | Status: SHIPPED | OUTPATIENT
Start: 2020-08-13 | End: 2020-08-13 | Stop reason: SDUPTHER

## 2020-08-13 RX ORDER — OXAZEPAM 15 MG/1
CAPSULE ORAL
Qty: 180 CAP | Refills: 1 | Status: SHIPPED | OUTPATIENT
Start: 2020-08-13 | End: 2022-03-15 | Stop reason: SDUPTHER

## 2020-08-13 RX ORDER — PRAVASTATIN SODIUM 80 MG/1
TABLET ORAL
Qty: 90 TAB | Refills: 2 | Status: SHIPPED | OUTPATIENT
Start: 2020-08-13 | End: 2021-05-26

## 2020-08-13 RX ORDER — MUPIROCIN 20 MG/G
OINTMENT TOPICAL 3 TIMES DAILY
Qty: 22 G | Refills: 1 | Status: ON HOLD | OUTPATIENT
Start: 2020-08-13 | End: 2021-12-16

## 2020-08-13 RX ORDER — PEN NEEDLE, DIABETIC 30 GX3/16"
NEEDLE, DISPOSABLE MISCELLANEOUS DAILY
Qty: 1 PACKAGE | Refills: 3 | Status: SHIPPED | OUTPATIENT
Start: 2020-08-13 | End: 2021-04-02 | Stop reason: ALTCHOICE

## 2020-08-13 RX ORDER — PREDNISONE 10 MG/1
TABLET ORAL
Qty: 180 TAB | Refills: 0 | Status: SHIPPED | OUTPATIENT
Start: 2020-08-13 | End: 2021-01-29

## 2020-08-13 RX ORDER — CARVEDILOL 6.25 MG/1
TABLET ORAL
Qty: 180 TAB | Refills: 3 | Status: SHIPPED | OUTPATIENT
Start: 2020-08-13 | End: 2021-07-19

## 2020-08-13 NOTE — PROGRESS NOTES
Chief Complaint   Patient presents with    Hypertension     3 month follow up    Diabetes    Cholesterol Problem     Visit Vitals  BP (!) 158/96 (BP 1 Location: Left arm, BP Patient Position: Sitting)   Pulse 93   Temp 98 °F (36.7 °C) (Oral)   Resp 21   Ht 5' 4\" (1.626 m)   Wt 194 lb 3.2 oz (88.1 kg)   SpO2 95%   BMI 33.33 kg/m²     1. Have you been to the ER, urgent care clinic since your last visit? Hospitalized since your last visit? No    2. Have you seen or consulted any other health care providers outside of the [address] [place] [address] since your last visit? Include any pap smears or colon screening.  Dr. Viry Alberto

## 2020-08-13 NOTE — PATIENT INSTRUCTIONS
Body Mass Index: Care Instructions  Your Care Instructions     Body mass index (BMI) can help you see if your weight is raising your risk for health problems. It uses a formula to compare how much you weigh with how tall you are. · A BMI lower than 18.5 is considered underweight. · A BMI between 18.5 and 24.9 is considered healthy. · A BMI between 25 and 29.9 is considered overweight. A BMI of 30 or higher is considered obese. If your BMI is in the normal range, it means that you have a lower risk for weight-related health problems. If your BMI is in the overweight or obese range, you may be at increased risk for weight-related health problems, such as high blood pressure, heart disease, stroke, arthritis or joint pain, and diabetes. If your BMI is in the underweight range, you may be at increased risk for health problems such as fatigue, lower protection (immunity) against illness, muscle loss, bone loss, hair loss, and hormone problems. BMI is just one measure of your risk for weight-related health problems. You may be at higher risk for health problems if you are not active, you eat an unhealthy diet, or you drink too much alcohol or use tobacco products. Follow-up care is a key part of your treatment and safety. Be sure to make and go to all appointments, and call your doctor if you are having problems. It's also a good idea to know your test results and keep a list of the medicines you take. How can you care for yourself at home? · Practice healthy eating habits. This includes eating plenty of fruits, vegetables, whole grains, lean protein, and low-fat dairy. · If your doctor recommends it, get more exercise. Walking is a good choice. Bit by bit, increase the amount you walk every day. Try for at least 30 minutes on most days of the week. · Do not smoke. Smoking can increase your risk for health problems. If you need help quitting, talk to your doctor about stop-smoking programs and medicines. These can increase your chances of quitting for good. · Limit alcohol to 2 drinks a day for men and 1 drink a day for women. Too much alcohol can cause health problems. If you have a BMI higher than 25  · Your doctor may do other tests to check your risk for weight-related health problems. This may include measuring the distance around your waist. A waist measurement of more than 40 inches in men or 35 inches in women can increase the risk of weight-related health problems. · Talk with your doctor about steps you can take to stay healthy or improve your health. You may need to make lifestyle changes to lose weight and stay healthy, such as changing your diet and getting regular exercise. If you have a BMI lower than 18.5  · Your doctor may do other tests to check your risk for health problems. · Talk with your doctor about steps you can take to stay healthy or improve your health. You may need to make lifestyle changes to gain or maintain weight and stay healthy, such as getting more healthy foods in your diet and doing exercises to build muscle. Where can you learn more? Go to http://[facility]ia-[facility].info/  Enter S176 in the search box to learn more about \"Body Mass Index: Care Instructions. \"  Current as of: December 11, 2019               Content Version: 12.5  © 6650-7068 [employer]. Care instructions adapted under license by [employer] (which disclaims liability or warranty for this information). If you have questions about a medical condition or this instruction, always ask your healthcare professional. [address] any warranty or liability for your use of this information.

## 2020-08-14 NOTE — PROGRESS NOTES
Very poor control of her diabetes her blood sugar of 231 and glycohemoglobin greater than 10 so resume glimepiride 4 mg daily which she had not been taking.

## 2020-08-17 NOTE — PROGRESS NOTES
Very poor control of her diabetes her blood sugar of 231 and glycohemoglobin greater than 10 so resume glimepiride 4 mg daily which she had not been taking. Discussed with patient to resume the Glimepiride. States she is also taking Prednisone at 20 mg daily and this time and soon will be tapering.

## 2020-08-27 ENCOUNTER — OFFICE VISIT (OUTPATIENT)
Dept: INTERNAL MEDICINE CLINIC | Age: 75
End: 2020-08-27
Payer: MEDICARE

## 2020-08-27 VITALS
SYSTOLIC BLOOD PRESSURE: 124 MMHG | HEIGHT: 63 IN | TEMPERATURE: 98.1 F | HEART RATE: 85 BPM | OXYGEN SATURATION: 97 % | DIASTOLIC BLOOD PRESSURE: 72 MMHG | RESPIRATION RATE: 18 BRPM | BODY MASS INDEX: 35.05 KG/M2 | WEIGHT: 197.8 LBS

## 2020-08-27 DIAGNOSIS — E66.01 SEVERE OBESITY (HCC): ICD-10-CM

## 2020-08-27 DIAGNOSIS — R74.8 ELEVATED LIVER ENZYMES: ICD-10-CM

## 2020-08-27 DIAGNOSIS — L30.9 DERMATITIS OF FACE: ICD-10-CM

## 2020-08-27 DIAGNOSIS — H92.01 EAR PAIN, RIGHT: ICD-10-CM

## 2020-08-27 DIAGNOSIS — H69.81 EUSTACHIAN TUBE DYSFUNCTION, RIGHT: Primary | ICD-10-CM

## 2020-08-27 PROCEDURE — 99213 OFFICE O/P EST LOW 20 MIN: CPT | Performed by: NURSE PRACTITIONER

## 2020-08-27 NOTE — PROGRESS NOTES
Manuel Pinedo is a 70 y.o. female     Chief Complaint   Patient presents with    Ear Pain     right ear pain started a few days ago. the pain comes and comes. Visit Vitals  /72 (BP 1 Location: Left arm, BP Patient Position: Sitting)   Pulse 85   Temp 98.1 °F (36.7 °C) (Oral)   Resp 18   Ht 5' 3\" (1.6 m)   Wt 197 lb 12.8 oz (89.7 kg)   SpO2 97%   BMI 35.04 kg/m²       Health Maintenance Due   Topic Date Due    DTaP/Tdap/Td series (1 - Tdap) 01/15/1968    Shingrix Vaccine Age 50> (1 of 2) 01/15/1997       1. Have you been to the ER, urgent care clinic since your last visit? Hospitalized since your last visit? No    2. Have you seen or consulted any other health care providers outside of the [address] [place] [address] since your last visit? Include any pap smears or colon screening.  No

## 2020-08-27 NOTE — PROGRESS NOTES
Subjective:       Chief Complaint   Patient presents with    Ear Pain     right ear pain started a few days ago. the pain comes and comes. History of present illness:  Fidelia Love is a 70 y.o. female who presents today with intermittent pain to her right ear that is been going on for the past 3 to 4 days. She states the quality is a sharp stabbing-like pain that lasts for only a few seconds. She denies any discharge from the ear canal, buzzing or high-pitched whine, or dizziness. She denies any allergy related symptoms including itchy watery eyes, runny nose, scratchy throat, or sneezing. The patient states she tried some hydrogen peroxide in her ear canal but without any relief. She states she tried some Advil and aspirin, and stated that it helped her symptoms. Additionally, the patient complains of an ongoing rash to her face that she states is been bothering her for the past 2 months. She has been seeing a dermatologist, and states that she has an appointment with her today for follow-up. She is concerned about the rash, as it does not appear to be benefiting from current or past treatments. She relates the rash to use of her prednisone for her interstitial lung disease. She denies history of biopsy or use of antibiotics orally to treat. She has been thinking about reducing the dose of her prednisone on her own. Patient also request to review her most recent lab work with focus on her elevated liver enzymes. She is concerned about this, as she would like more information as to what would cause these elevations.     Patient Active Problem List   Diagnosis Code    Controlled type 2 diabetes mellitus with stage 2 chronic kidney disease, with long-term current use of insulin ([facility]) E11.22, N18.2, Z79.4    Non-seasonal allergic rhinitis J30.89    Class 1 obesity due to excess calories without serious comorbidity with body mass index (BMI) of 33.0 to 33.9 in adult E66.09, Z68.33    Rosacea L71.9    Mixed hyperlipidemia E78.2    Anxiety F41.9    GI bleed K92.2    Overactive bladder N32.81    Polymyalgia rheumatica ([facility]) M35.3    IBS (irritable bowel syndrome) K58.9    Hypertension with renal disease I12.9    CKD (chronic kidney disease), stage II N18.2    Avascular necrosis of hip ([facility]) M87.059    Abnormal LFTs R94.5    Vitamin D deficiency E55.9    Recurrent depression ([facility]) F33.9    Primary osteoarthritis involving multiple joints M89.49    Sleep disturbance G47.9    Lumbar disc disease M51.9    Spinal stenosis, lumbar M48.061    Dyspnea on exertion R06.00    Spinal stenosis, cervical region M48.02    Cervical stenosis of spine M48.02    Interstitial lung disease ([facility]) J84.9    Acute bronchitis J20.9    Non-recurrent acute suppurative otitis media of left ear without spontaneous rupture of tympanic membrane H66.002    Impacted cerumen of right ear H61.21    Other fatigue R53.83    Gait instability R26.81    Glossitis K14.0    Cough R05    Alcohol screening Z13.39    Primary osteoarthritis of right shoulder M19.011    Acute non-recurrent maxillary sinusitis J01.00    Tremor R25.1    Severe obesity ([facility]) E66.01      Past Medical History:   Diagnosis Date    Abnormal LFTs 08/24/2017    FATTY LIVER    Arthritis     OSTEO    Avascular necrosis of hip ([facility] [address] 08/24/2017    BILAT HIPS    Breast CA ([address] 1989, 2001    BILATERAL; SURGERY, CHEMO    Chronic pain     CKD (chronic kidney disease), stage II 8/24/2017    Coagulation disorder ([address] 1984    ITP  (DR [facility] BRADSHAW) - PLATELETS DROPPED TO 5K    Depression     Diabetes ([facility] [address]     TYPE 2; NIDDM    DJD (degenerative joint disease), multiple sites 8/24/2017    GI bleed 2008    HEMORRHOIDS    Hyperlipemia     Hypertension with renal disease 8/24/2017    IBS (irritable bowel syndrome) 8/24/2017    Ill-defined condition 2015    PNEUMONIA X2 - HOSPITALIZED IN 2015    Interstitial lung disease ([facility] [address] 04/01/2019    Liver disease     FATTY LIVER    Myalgia 8/24/2017    On statin therapy 8/24/2017    Overactive bladder 8/24/2017    Pneumonia 04/2015    HOSPITALIZED 3 WEEKS.     Polymyalgia rheumatica ([facility]) 8/24/2017    Prophylactic antibiotic 8/24/2017    Reflex abnormality     acid reflex    Rosacea       Allergies   Allergen Reactions    Metformin Diarrhea    Statins-Hmg-Coa Reductase Inhibitors Myalgia     Myalgia with  multiple statins  Takes pravachol     Codeine Rash     Rash on thighs    Darvon [Propoxyphene] Other (comments)     \"I see worms\"    Pcn [Penicillins] Rash    Sulfa (Sulfonamide Antibiotics) Rash      Family History   Problem Relation Age of Onset    Heart Disease Mother     Kidney Disease Mother     Lung Disease Mother         COPD    Diabetes Brother     Pacemaker Brother     Kidney Disease Brother     Hypertension Brother     Anesth Problems Neg Hx       Social History     Socioeconomic History    Marital status:      Spouse name: Not on file    Number of children: Not on file    Years of education: Not on file    Highest education level: Not on file   Occupational History    Not on file   Social Needs    Financial resource strain: Not on file    Food insecurity     Worry: Not on file     Inability: Not on file    Transportation needs     Medical: Not on file     Non-medical: Not on file   Tobacco Use    Smoking status: Never Smoker    Smokeless tobacco: Never Used   Substance and Sexual Activity    Alcohol use: No    Drug use: No    Sexual activity: Never   Lifestyle    Physical activity     Days per week: Not on file     Minutes per session: Not on file    Stress: Not on file   Relationships    Social connections     Talks on phone: Not on file     Gets together: Not on file     Attends Denominational service: Not on file     Active member of club or organization: Not on file     Attends meetings of clubs or organizations: Not on file     Relationship status: Not on file    Intimate partner violence     Fear of current or ex partner: Not on file     Emotionally abused: Not on file     Physically abused: Not on file     Forced sexual activity: Not on file   Other Topics Concern    Not on file   Social History Narrative    Not on file     Prior to Admission medications    Medication Sig Start Date End Date Taking? Authorizing Provider   pravastatin (PRAVACHOL) 80 mg tablet T1T PO EVERY NIGHT FOR CHOLESTEROL 8/13/20  Yes Adrianna Galindo MD   buPROPion XL (Wellbutrin XL) 150 mg tablet Wellbutrin  mg 24 hr tablet, extended release 8/13/20  Yes Adrianna Galindo MD   Insulin Needles, Disposable, 31 gauge x 5/16\" ndle by SubCUTAneous route daily. Use with Lantus flex pen daily 8/13/20  Yes Adrianna Galindo MD   [facility]) 15 mg capsule TAKE 2 CAPSULES BY MOUTH NIGHTLY AT BEDTIME 8/13/20  Yes Adrianna Galindo MD   sertraline (ZOLOFT) 100 mg tablet 1 tab QD 8/13/20  Yes Adrianna Galindo MD   benzonatate (TESSALON) 200 mg capsule Take 1 Cap by mouth three (3) times daily as needed for Cough. 8/13/20  Yes Adrianna Galindo MD   glucose blood VI test strips (True Metrix Glucose Test Strip) strip USE ONCE DAILY 8/13/20  Yes Adrianna Galindo MD   clindamycin (CLINDAGEL) 1 % topical gel Apply  to affected area two (2) times a day. use thin film on affected area 8/13/20  Yes Adrianna Galindo MD   predniSONE (DELTASONE) 10 mg tablet 10 mg BID 8/13/20  Yes Adrianna Galindo MD   pirocin [facility]) 2 % ointment Apply  to affected area three (3) times daily. 8/13/20  Yes Adrianna Galindo MD   carvediloL (COREG) 6.25 mg tablet Take 1 tablet twice daily 8/13/20  Yes Adrianna Galindo MD   glimepiride (AMARYL) 4 mg tablet Take 1 Tab by mouth every morning.  8/10/20  Yes Adrianna Galindo MD   insulin glargine (LANTUS,BASAGLAR) 100 unit/mL (3 mL) inpn Take 30 units daily 8/4/20  Yes Adrianna Galindo MD   SITagliptin (JANUVIA) 100 mg tablet Take 1 Tab by mouth daily. 7/30/20  Yes Yoselin Melo MD   hydroCHLOROthiazide (HYDRODIURIL) 25 mg tablet TAKE 1 TABLET EVERY DAY FOR FLUID AND BP 7/30/20  Yes Yoselin Melo MD   B12-iodin-mag-zinc-nicole-herb193 50 mcg-75 mcg -100 mg cap Take  by mouth. Yes Provider, Historical   fenofibrate nanocrystallized (TRICOR) 145 mg tablet TAKE 1 TABLET EVERY DAY 1/15/20  Yes Yoselin Melo MD   pantoprazole (PROTONIX) 40 mg tablet Take 40 mg by mouth daily. 11/21/19  Yes Provider, Historical   montelukast sodium (SINGULAIR PO) Take  by mouth. Yes Provider, Historical   cholecalciferol (VITAMIN D3) 1,000 unit cap Take  by mouth daily. Indications: unknown of the mg. Yes Provider, Historical   biotin-silicon diox-L-cysteine 3,000 mcg -100 mg-50 mg TbER Take 1 Tab by mouth daily. Yes Provider, Historical   clindamycin (CLEOCIN) 300 mg capsule Take 2 capsules 1 hour before dental procedure 8/13/20   Yoselin Melo MD        Review of Systems              Constitutional:  She denies fever, weight loss, sweats or fatigue. EYES: No blurred or double vision,               ENT: no nasal congestion, right ear pain with mild headache but without dizziness. No difficulty swallowing, taste, speech or  smell. Respiratory: History of interstitial lung disease with shortness of breath. Denies acute sputum production. Cardiac:  Denies chest pain, palpitations, unexplained indigestion, syncope, edema, PND or orthopnea. Skin: Persistent irritative rash to face with ulcerations. Neurological:  No acute numbness, tingling, burning paresthesias or loss of motor strength. No syncope, dizziness, frequent headaches or memory loss.   Hematologic:  No easy bruising  Lymphatic: No lymph node enlargement    Objective:     Vitals:    08/27/20 1315   BP: 124/72   Pulse: 85   Resp: 18   Temp: 98.1 °F (36.7 °C)   TempSrc: Oral   SpO2: 97%   Weight: 197 lb 12.8 oz (89.7 kg)   Height: 5' 3\" (1.6 m)   PainSc:   0 - No pain       Body mass index is 35.04 kg/m². Physical Examination:              General Appearance:  Well-developed, well-nourished, no acute distress. HEENT:      Ears:  The TMs are positive for air/fluid level with dull membranes. Eyes:  The pupillary responses were normal.  Extraocular muscle function intact. Nares: Clear w/o edema or erythema  Pharynx:  Clear with teeth in good repair. No masses were noted. Neck:  Supple without thyromegaly or adenopathy. No JVD noted. No bruits. Lungs: Somewhat diminished throughout to auscultation and percussion. No crackles. Cardiac:  Regular rate and rhythm without murmur. PMI not displaced. No gallop, rub or click. Abdominal: Soft, non-tender, no hepata-spleenomegally or masses  Skin: Scattered reddened ulcerative areas to face primarily to right side including chin and cheek area. No drainage noted. No pustules or vesicles. Lymph Nodes: No significant lymphadenopathy  Hematologic:   No purpura or petechiae        Assessment/Plan:         1. Severe obesity ([address]        Impressions/Plan:  1: Patient appears to have right-sided eustachian tube dysfunction. I have suggested that she may use over-the-counter Advil for a limited period of time not to exceed 5 days in a row due to heightened risk of GI bleed. I have also asked her to perform a Valsalva maneuver and have demonstrated that for her. She may apply moist heat to her neck and ear area. 2: I have counseled the patient on the need to follow-up with dermatology, and not to decrease prednisone on her own. I have also discussed possible need for further intervention via dermatology including biopsy of these areas. 3: I have reviewed the patient's most recent labs concerning her elevated liver enzymes. I have explained the likelihood of influence of steroids, but also possibility of fatty liver infiltrates. I suggested following up with her PCP regarding these concerns.     4: Patient states understanding and agrees with plan. Follow-up and Dispositions    · Return if symptoms worsen or fail to improve. No results found for any visits on 08/27/20. Lenore Mendez NP    The patient was given after the visit summary the patient verbalized an understanding of the plans and problems as explained.

## 2020-09-02 NOTE — PROGRESS NOTES
Neurology Note    Patient ID:  Faye Maloney  [id number]  50 y.o.  1945      Date of Consultation:  September 3, 2020    Subjective: I still fall       History of Present Illness:   Faye Maloney is a 70 y.o. female who returns to the neurology clinic at [facility] for an evaluation. She has been seen by multiple prior neurologist in the past and I first met the patient on February 6, 2020. It was after that day she did have a follow-up telephone consultation on May 5, 2020. Please see my history of present illness, examination, and treatment based plan from those visits. She also did have an EMG nerve conduction study which revealed a bilateral length dependent sensory axonal neuropathy with no evidence of a myopathy or radiculopathy. 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 Since that visit,  She does continue to struggle with pain. Everything has healed from her surgery, but she still has pain in her hips and back. She is going to see a pain specialist next week, Dr. Padmini Hand. In regards to her polymyalgia rheumatica, she is trying to decrease her prednisone,  She is back down to prednisone for 10 three days a week now. Essentially this is every other day. She has noticed no difference  in her  Pain levels with the decrease. She did have recent serology and found to have her diabetes screen to be poorly controlled. She also had a sig. Elevated in her liver enzymes. She is now trying a personal liver cleansing.      Past Medical History:   Diagnosis Date    Abnormal LFTs 08/24/2017    FATTY LIVER    Arthritis     OSTEO    Avascular necrosis of hip ([facility] 08/24/2017    BILAT HIPS    Breast CA ([facility] 1989, 2001    BILATERAL; SURGERY, CHEMO    Chronic pain     CKD (chronic kidney disease), stage II 8/24/2017    Coagulation disorder ([facility] [facility] 1984    ITP  (DR CHU BRADSHAW) - PLATELETS DROPPED TO 5K    Depression     Diabetes ([facility] [facility]     TYPE 2; NIDDM    DJD (degenerative joint disease), multiple sites 8/24/2017    GI bleed 2008    HEMORRHOIDS    Hyperlipemia     Hypertension with renal disease 8/24/2017    IBS (irritable bowel syndrome) 8/24/2017    Ill-defined condition 2015    PNEUMONIA X2 - HOSPITALIZED IN 2015    Interstitial lung disease ([facility] 04/01/2019    Liver disease     FATTY LIVER    Myalgia 8/24/2017    On statin therapy 8/24/2017    Overactive bladder 8/24/2017    Pneumonia 04/2015    HOSPITALIZED 3 WEEKS.     Polymyalgia rheumatica ([facility] [facility] 8/24/2017    Prophylactic antibiotic 8/24/2017    Reflex abnormality     acid reflex    Rosacea         Past Surgical History:   Procedure Laterality Date    BREAST SURGERY PROCEDURE UNLISTED  1993, 2002    RECONSTRUCTION X2    HX APPENDECTOMY  1950    HX BREAST BIOPSY Bilateral     HX CATARACT REMOVAL Bilateral     HX COLONOSCOPY      HX ENDOSCOPY      HX GI      COLONOSCOPY    HX HEENT      WISDOM TEETH    HX HYSTERECTOMY  2000S    HX MASTECTOMY Right 1989    HX MASTECTOMY Left 2001    HX ORTHOPAEDIC  2008    LUMBAR FUSION    HX ORTHOPAEDIC  2018    RE-DO LUMBAR FUSION, AND ADDED THORACIC FUSION    HX ORTHOPAEDIC  03/26/2019    neckectomy    HX TUBAL LIGATION  1981    HX UROLOGICAL  2000s    BLADDER SLING    TOTAL HIP ARTHROPLASTY Left 11/16/2016    ANTERIOR APPROACH, DR Gwendolyn De La Torre; (POSTOP: STANDS ONE INCH TALLER ON LEFT FOOT)    TOTAL HIP ARTHROPLASTY Right 12/2016    ANTERIOR APPROACH (DR Gwendolyn De La Torre)        Family History   Problem Relation Age of Onset    Heart Disease Mother     Kidney Disease Mother     Lung Disease Mother         COPD    Diabetes Brother     Pacemaker Brother     Kidney Disease Brother     Hypertension Brother     Anesth Problems Neg Hx         Social History     Tobacco Use    Smoking status: Never Smoker    Smokeless tobacco: Never Used   Substance Use Topics    Alcohol use: No        Allergies   Allergen Reactions    Metformin Diarrhea    Statins-Hmg-Coa Reductase Inhibitors Myalgia     Myalgia with  multiple statins  Takes pravachol     Codeine Rash     Rash on thighs    Darvon [Propoxyphene] Other (comments)     \"I see worms\"    Pcn [Penicillins] Rash    Sulfa (Sulfonamide Antibiotics) Rash        Prior to Admission medications    Medication Sig Start Date End Date Taking? Authorizing Provider   doxycycline (VIBRAMYCIN) 100 mg capsule TAKE 1 (ONE) CAPSULE BY MOUTH TWICE DAILY WITH FOOD 8/27/20  Yes Provider, Historical   pravastatin (PRAVACHOL) 80 mg tablet T1T PO EVERY NIGHT FOR CHOLESTEROL 8/13/20  Yes Andrea Shell MD   buPROPion XL (Wellbutrin XL) 150 mg tablet Wellbutrin  mg 24 hr tablet, extended release 8/13/20  Yes Andrea Shell MD   oxazepam (SERAX) 15 mg capsule TAKE 2 CAPSULES BY MOUTH NIGHTLY AT BEDTIME 8/13/20  Yes Andrea Shell MD   sertraline (ZOLOFT) 100 mg tablet 1 tab QD 8/13/20  Yes Andrea Shell MD   benzonatate (TESSALON) 200 mg capsule Take 1 Cap by mouth three (3) times daily as needed for Cough. 8/13/20  Yes Andrea Shell MD   glucose blood VI test strips (True Metrix Glucose Test Strip) strip USE ONCE DAILY 8/13/20  Yes Andrea Shell MD   clindamycin (CLINDAGEL) 1 % topical gel Apply  to affected area two (2) times a day. use thin film on affected area 8/13/20  Yes Andrea Shell MD   predniSONE (DELTASONE) 10 mg tablet 10 mg BID  Patient taking differently: Three times a week 8/13/20  Yes Andrea Shell MD   mupirocin [facility]) 2 % ointment Apply  to affected area three (3) times daily.  8/13/20  Yes Andrea Shell MD   clindamycin (CLEOCIN) 300 mg capsule Take 2 capsules 1 hour before dental procedure 8/13/20  Yes Andrea Shell MD   carvediloL (COREG) 6.25 mg tablet Take 1 tablet twice daily 8/13/20  Yes Andrea Shell MD   glimepiride (AMARYL) 4 mg tablet Take 1 Tab by mouth every morning. 8/10/20  Yes Arnav Rider MD   SITagliptin (JANUVIA) 100 mg tablet Take 1 Tab by mouth daily. 7/30/20  Yes Arnav Rider MD   hydroCHLOROthiazide (HYDRODIURIL) 25 mg tablet TAKE 1 TABLET EVERY DAY FOR FLUID AND BP 7/30/20  Yes Arnav Rider MD   fenofibrate nanocrystallized (TRICOR) 145 mg tablet TAKE 1 TABLET EVERY DAY 1/15/20  Yes Arnav Rider MD   pantoprazole (PROTONIX) 40 mg tablet Take 40 mg by mouth daily. 11/21/19  Yes Provider, Historical   montelukast sodium (SINGULAIR PO) Take  by mouth. Yes Provider, Historical   cholecalciferol (VITAMIN D3) 1,000 unit cap Take  by mouth daily. Indications: unknown of the mg. Yes Provider, Historical   biotin-silicon diox-L-cysteine 3,000 mcg -100 mg-50 mg TbER Take 1 Tab by mouth daily. Yes Provider, Historical   Insulin Needles, Disposable, 31 gauge x 5/16\" ndle by SubCUTAneous route daily. Use with Lantus flex pen daily 8/13/20   Arnav Rider MD   insulin glargine (LANTUS,BASAGLAR) 100 unit/mL (3 mL) inpn Take 30 units daily 8/4/20   Arnav Rider MD   B12-iodin-mag-zinc-nicole-herb193 50 mcg-75 mcg -100 mg cap Take  by mouth.     Provider, Historical       Review of Systems:    General, constitutional: Weakness, fatigue  Eyes, vision: Blurry vision  Ears, nose, throat: negative  Cardiovascular, heart: negative  Respiratory: Shortness of breath due to her underlying pulmonary condition  Gastrointestinal: negative  Genitourinary: Incontinence  Musculoskeletal: Diffuse pain and weakness  Skin and integumentary: negative  Psychiatric: negative  Endocrine: negative  Neurological: negative, except for HPI  Hematologic/lymphatic: negative  Allergy/immunology: negative    Objective:     Visit Vitals  /70   Pulse (!) 103   Temp 98.3 °F (36.8 °C) (Oral)   Resp 18   Ht 5' 3\" (1.6 m)   Wt 196 lb 6.4 oz (89.1 kg)   SpO2 93%   BMI 34.79 kg/m²       Physical Exam:    General: appears well nourished in no acute distress  Neck: no carotid bruits  Lungs: clear to auscultation  Heart:  no murmurs, regular rate  Lower extremity: peripheral pulses palpable and no edema  Skin: intact    Neurological exam:    Awake, alert, oriented to person, place and time  Recent and remote memory were normal  Attention and concentration were intact  Language was intact. There was no aphasia  Speech: no dysarthria  Fund of knowledge was preserved  She was always correct answering questions but does seem slow to respond at times. Cranial nerves:   II-XII were tested    Perrrla  Visual fields were full  Eomi, no evidence of nystagmus  Facial sensation:  normal and symmetric  Facial motor: normal and symmetric  Hearing intact  SCM strength intact  Tongue: midline without fasciculations  There was no clear masked face ease. Motor: Tone normal    No evidence of fasciculations    Strength testing:   deltoid triceps biceps Wrist ext. Wrist flex. intrinsics Hip flex. Hip ext. Knee ext. Knee flex Dorsi flex Plantar flex   Right 5 5 5 5 5 5 4 5 5 5 5 5   Left 5 5 5 5 5 5 4 5 5 5 5 5     Pain does limit her ability to sustain a contraction. Sensory:  Upper extremity: intact to pp, light touch, and vibration > 10 seconds  Lower extremity: Pinprick was decreased to just above her ankle. Her vibration was present for only 1 second at her toes and 3 seconds at her ankles. It was present for 7 seconds at her knees. This is unchanged from last visit    Reflexes:    Right Left  Biceps  1 1  Triceps 1 1  Brachiorad. 1 1  Patella  1 1  Achilles 1 1    Plantar response:  flexor bilaterally      Cerebellar testing:  no resting tremor apparent, he does have a mild postural tremor and then an action based tremor. She does have slowness in her movements of finger tapping and rapid alternating movements. Romberg: Not assessed due to concerns of safety    Gait: Wide-based and antalgic.     Labs:     Lab Results Component Value Date/Time    Hemoglobin A1c 10.4 (H) 08/13/2020 11:23 AM    Sodium 142 08/13/2020 11:23 AM    Potassium 4.6 08/13/2020 11:23 AM    Chloride 101 08/13/2020 11:23 AM    Glucose 231 (H) 08/13/2020 11:23 AM    BUN 28.0 (H) 08/13/2020 11:23 AM    Creatinine 0.9 08/13/2020 11:23 AM    Calcium 10.4 (H) 08/13/2020 11:23 AM    WBC 7.6 02/13/2020 11:37 AM    HCT 48.8 (H) 02/13/2020 11:37 AM    HGB 16.2 (H) 02/13/2020 11:37 AM    PLATELET 464 57/81/3138 11:37 AM       Imaging:    Results from Hospital Encounter encounter on 02/17/20   MRI BRAIN WO CONT    Narrative EXAM: MRI BRAIN WO CONT    TECHNIQUE: Brain images including sagittal and axial T1-weighted, axial FLAIR,  T2-weighted, diffusion weighted, gradient echo,  coronal T2, axial thin section  fast images of the cranial nerves    IV CONTRAST:  None    INDICATION:  Unsteadiness on feet    COMPARISON:  CT head of 9/13/2019    FINDINGS:  BRAIN PARENCHYMA:  No acute or chronic infarct. No significant white matter  disease or other focal lesion. No parenchymal masses. Normal volume and  appearance of the posterior fossa. The more signal abnormality of the cranial  nerves. INTRACRANIAL HEMORRHAGE: None. CSF SPACES:  Normal in size and morphology for the patient's age. BASAL CISTERNS:  Patent. MIDLINE SHIFT: None. VASCULAR SYSTEM:  Normal flow voids. PARANASAL SINUSES AND MASTOID AIR CELLS:  No significant opacification. VISUALIZED ORBITS:  No significant abnormalities. VISUALIZED UPPER CERVICAL SPINE:  No significant abnormalities. SELLA:  No enlargement or  focal abnormality. SKULL BASE:  No significant abnormalities. Cerebellar tonsils in normal  position. CALVARIUM:  Intact. OTHER: 6 x 7 mm extra-axial hypointense focus in the medial aspect of the  anterior middle cranial fossa corresponding to an area of dense calcification on  the prior CT scan, most consistent with a small incidental meningioma.  Projects  into the adjacent CSF space with no contact or mass effect on the adjacent  brain. Impression IMPRESSION:    1. No parenchymal abnormalities. 2. Subcentimeter densely calcified extra-axial lesion in the anterior middle  cranial fossa, most consistent with a small incidental meningioma. Results from [facility] encounter on 02/19/20   CT CHEST WO CONT    Narrative INDICATION: Interstitial lung disease, high-resolution CT    COMPARISON: 3/20/2019, 8/14/2018 and 6/26/2017    CONTRAST: None. TECHNIQUE:  Multiple axial images were obtained from the lung apices to the  adrenal glands. Intravenous contrast material was not utilized. The absence of  intravenous contrast reduces the sensitivity for evaluation of the mediastinum  and upper abdominal organs. Images were reformatted in the sagittal and coronal  plane. High-resolution images obtained inspiration, expiration and the prone  position. CT dose reduction was achieved through use of a standardized protocol  tailored for this examination and automatic exposure control for dose  modulation. FINDINGS:      There is a left breast implant in place with findings consistent with  intracapsular breast implant rupture. Clinical correlation is suggested. THYROID: The left lobe of the thyroid gland appears to have been surgically  removed. Right lobe is unremarkable. Less than 5 mm hypodensity in the gland is  unchanged in size. .. MEDIASTINUM: No mass or lymphadenopathy. MARIAN: No mass or lymphadenopathy. Calcified lymph nodes especially right hilum  are consistent with old granulomatous disease. Small oval cystic area in the AP  window has decreased in size. THORACIC AORTA: No aneurysm. MAIN PULMONARY ARTERY: Normal in caliber. TRACHEA/BRONCHI: Patent. ESOPHAGUS: No wall thickening or dilatation. HEART: Normal in size. PLEURA: No effusion or pneumothorax.   LUNGS: The lung windows and high-resolution images demonstrate that the central  airways are patent. .    Calcified granuloma most pronounced in the right lower lobe are unchanged. Small  noncalcified nodule in the middle lobe and in the right lower lobe are unchanged  dating back to 6/26/2017. The high-resolution images demonstrate mild peripheral reticulation this is seen  involving all lung zones. There is perhaps a very slight basilar predominance. There is no associated bronchiolectasis or honeycombing. There is no air  trapping. These findings are stable when compared with the prior exams. These  findings are nonspecific and may represent mild interstitial lung abnormality. These findings may relate to mild interstitial fibrosis. There is no significant air trapping. .  INCIDENTALLY IMAGED UPPER ABDOMEN: There is hepatic steatosis. Neftaly Octave BONES: No destructive bone lesion. The patient is status post cervical spine  surgery and surgery on the lower thoracic and lumbar spine. There are  degenerative changes. Impression IMPRESSION:    1. Stable small pulmonary nodules are likely benign. 2. The mild peripheral subpleural reticulation seen in the lung fields with a  slight basilar predominance is unchanged when compared to previous examinations. These findings are nonspecific and may represent mild interstitial lung  abnormality. Mild nonspecific fibrosis would have a similar appearance. 3. Left breast implant demonstrates findings consistent with intracapsular  rupture. Clinical correlation is suggested. lft were levated  hgba1c 10.4   Ck 33  LDL 80      I did independently review her brain MRI from February 18, 2020. There was significant atrophy and periventricular white matter micro-ischemic chronic changes    Assessment and Plan:   The patient is a pleasant 79-year-old female with multiple medical problems as noted below which impacts her neurological health who presents with worsening cognition, balance difficulties, pain, and weakness.   Her neurological examination does reveal slowness in cognitive thought, proximal lower extremity weakness, a distal peripheral neuropathy, and an action and postural tremor. Lower extremity weakness/balance difficulties: This is related to her Peripheral neuropathy and multiple spinal surgeries. There is also multiple medical problems that can attribute to her overall weakness. Her polymyalgia rheumatica can also be contributing. The neuropathy may be multifactorial including related to her multiple medical conditions including her diabetes which is not under optimal control. Cognitive slowing:    Her MRI does reveal mild atrophy but no tumor or significant stroke. We talked about risk of vascular dementia and importance of performing cognitively stimulating activity daily. Vascular disease  Stroke risk factors include: htn, dm, cholesterol  I would like her to take an 81 mg aspirin daily. I talked about the rationale of this with her today. Hypertension: Continue aggressive control with goal systolic blood pressure under 140  Diabetes: Continue aggressive control with endocrinology  Dyslipidemia: Continue to follow closely with primary care    I provided stroke education today in regards to risk factors for stroke and lifestyle modifications to help minimize the risk of future stroke. This included medication compliance, regular follow up with primary care physician,  and healthy lifestyle habits (nutrition/exercise)        Essential tremor:      She does not have any parkinsonian features on examination today. After discussion, we will hold on medication at this time.   This is something I will be followed closely over time        Patient Active Problem List   Diagnosis Code    Controlled type 2 diabetes mellitus with stage 2 chronic kidney disease, with long-term current use of insulin ([facility] E11.22, N18.2, Z79.4    Non-seasonal allergic rhinitis J30.89    Class 1 obesity due to excess calories without serious comorbidity with body mass index (BMI) of 33.0 to 33.9 in adult E66.09, Z68.33    Rosacea L71.9    Mixed hyperlipidemia E78.2    Anxiety F41.9    GI bleed K92.2    Overactive bladder N32.81    Polymyalgia rheumatica (HCC) M35.3    IBS (irritable bowel syndrome) K58.9    Hypertension with renal disease I12.9    CKD (chronic kidney disease), stage II N18.2    Avascular necrosis of hip ([facility]) M87.059    Abnormal LFTs R94.5    Vitamin D deficiency E55.9    Recurrent depression ([facility]) F33.9    Primary osteoarthritis involving multiple joints M89.49    Sleep disturbance G47.9    Lumbar disc disease M51.9    Spinal stenosis, lumbar M48.061    Dyspnea on exertion R06.00    Spinal stenosis, cervical region M48.02    Cervical stenosis of spine M48.02    Interstitial lung disease ([facility]) J84.9    Acute bronchitis J20.9    Non-recurrent acute suppurative otitis media of left ear without spontaneous rupture of tympanic membrane H66.002    Impacted cerumen of right ear H61.21    Other fatigue R53.83    Gait instability R26.81    Glossitis K14.0    Cough R05    Alcohol screening Z13.39    Primary osteoarthritis of right shoulder M19.011    Acute non-recurrent maxillary sinusitis J01.00    Tremor R25.1    Severe obesity ([address] E66.01          The patient should return to clinic    Renewed medication: none today        Signed By:  Jose Manuel Bryan DO FAAN    September 3, 2020

## 2020-09-03 ENCOUNTER — OFFICE VISIT (OUTPATIENT)
Dept: NEUROLOGY | Age: 75
End: 2020-09-03
Payer: MEDICARE

## 2020-09-03 VITALS
DIASTOLIC BLOOD PRESSURE: 70 MMHG | SYSTOLIC BLOOD PRESSURE: 118 MMHG | OXYGEN SATURATION: 93 % | RESPIRATION RATE: 18 BRPM | HEART RATE: 103 BPM | TEMPERATURE: 98.3 F | WEIGHT: 196.4 LBS | BODY MASS INDEX: 34.8 KG/M2 | HEIGHT: 63 IN

## 2020-09-03 DIAGNOSIS — G31.84 MCI (MILD COGNITIVE IMPAIRMENT): ICD-10-CM

## 2020-09-03 DIAGNOSIS — G25.0 ESSENTIAL TREMOR: ICD-10-CM

## 2020-09-03 DIAGNOSIS — M48.02 CERVICAL STENOSIS OF SPINE: Primary | ICD-10-CM

## 2020-09-03 DIAGNOSIS — G62.9 NEUROPATHY: ICD-10-CM

## 2020-09-03 PROCEDURE — 99215 OFFICE O/P EST HI 40 MIN: CPT | Performed by: PSYCHIATRY & NEUROLOGY

## 2020-09-03 RX ORDER — DOXYCYCLINE 100 MG/1
CAPSULE ORAL
COMMUNITY
Start: 2020-08-27 | End: 2021-12-16

## 2020-09-21 RX ORDER — FUROSEMIDE 40 MG/1
TABLET ORAL
Qty: 30 TAB | Refills: 5 | Status: SHIPPED | OUTPATIENT
Start: 2020-09-21 | End: 2020-11-16 | Stop reason: ALTCHOICE

## 2020-09-21 NOTE — TELEPHONE ENCOUNTER
RX refill request from the patient/pharmacy. Patient last seen 09- with labs, and next appt. scheduled for 11-  Requested Prescriptions     Pending Prescriptions Disp Refills    furosemide (LASIX) 40 mg tablet [Pharmacy Med Name: FUROSEMIDE 40MG] 30 Tab 5     Sig: TAKE 1 TABLET BY MOUTH DAILY   .

## 2020-09-22 ENCOUNTER — OFFICE VISIT (OUTPATIENT)
Dept: INTERNAL MEDICINE CLINIC | Age: 75
End: 2020-09-22
Payer: MEDICARE

## 2020-09-22 VITALS
WEIGHT: 200.9 LBS | TEMPERATURE: 98 F | OXYGEN SATURATION: 96 % | HEART RATE: 79 BPM | DIASTOLIC BLOOD PRESSURE: 92 MMHG | SYSTOLIC BLOOD PRESSURE: 144 MMHG | HEIGHT: 63 IN | BODY MASS INDEX: 35.6 KG/M2 | RESPIRATION RATE: 20 BRPM

## 2020-09-22 DIAGNOSIS — J20.9 ACUTE BRONCHITIS, UNSPECIFIED ORGANISM: Primary | ICD-10-CM

## 2020-09-22 DIAGNOSIS — J84.9 INTERSTITIAL LUNG DISEASE (HCC): ICD-10-CM

## 2020-09-22 PROCEDURE — G9708 BILAT MAST/HX BI /UNILAT MAS: HCPCS | Performed by: INTERNAL MEDICINE

## 2020-09-22 PROCEDURE — 1101F PT FALLS ASSESS-DOCD LE1/YR: CPT | Performed by: INTERNAL MEDICINE

## 2020-09-22 PROCEDURE — G8536 NO DOC ELDER MAL SCRN: HCPCS | Performed by: INTERNAL MEDICINE

## 2020-09-22 PROCEDURE — G8753 SYS BP > OR = 140: HCPCS | Performed by: INTERNAL MEDICINE

## 2020-09-22 PROCEDURE — G8755 DIAS BP > OR = 90: HCPCS | Performed by: INTERNAL MEDICINE

## 2020-09-22 PROCEDURE — G8427 DOCREV CUR MEDS BY ELIG CLIN: HCPCS | Performed by: INTERNAL MEDICINE

## 2020-09-22 PROCEDURE — G9717 DOC PT DX DEP/BP F/U NT REQ: HCPCS | Performed by: INTERNAL MEDICINE

## 2020-09-22 PROCEDURE — G8399 PT W/DXA RESULTS DOCUMENT: HCPCS | Performed by: INTERNAL MEDICINE

## 2020-09-22 PROCEDURE — 99213 OFFICE O/P EST LOW 20 MIN: CPT | Performed by: INTERNAL MEDICINE

## 2020-09-22 PROCEDURE — 1090F PRES/ABSN URINE INCON ASSESS: CPT | Performed by: INTERNAL MEDICINE

## 2020-09-22 PROCEDURE — 3017F COLORECTAL CA SCREEN DOC REV: CPT | Performed by: INTERNAL MEDICINE

## 2020-09-22 PROCEDURE — G8417 CALC BMI ABV UP PARAM F/U: HCPCS | Performed by: INTERNAL MEDICINE

## 2020-09-22 RX ORDER — CEFUROXIME AXETIL 500 MG/1
500 TABLET ORAL 2 TIMES DAILY
Qty: 20 TAB | Refills: 0 | Status: SHIPPED | OUTPATIENT
Start: 2020-09-22 | End: 2020-11-16 | Stop reason: ALTCHOICE

## 2020-09-22 NOTE — PROGRESS NOTES
Subjective:   Anette Holland is a 70 y.o. female      Chief Complaint   Patient presents with    Cold Symptoms     cough,nasal drainage x 4 days        History of present illness: She presents today complaining of cough and head and nasal congestion for last 4 days. She has had some postnasal drainage and does get a little sputum if there is a little color to it. She notes no fevers or chills. She actually is for interstitial lung disease normally takes prednisone 10 mg Monday Wednesday and Friday but but when this started on the 19th she took 20 mg of prednisone that day and the next day and then 10 mg yesterday has not taken a today and feels like her breathing is getting a little worse. She has noted no fevers or chills. No other current cardiorespiratory complaints.     Patient Active Problem List   Diagnosis Code    Controlled type 2 diabetes mellitus with stage 2 chronic kidney disease, with long-term current use of insulin ([facility] E11.22, N18.2, Z79.4    Non-seasonal allergic rhinitis J30.89    Class 1 obesity due to excess calories without serious comorbidity with body mass index (BMI) of 33.0 to 33.9 in adult E66.09, Z68.33    Rosacea L71.9    Mixed hyperlipidemia E78.2    Anxiety F41.9    GI bleed K92.2    Overactive bladder N32.81    Polymyalgia rheumatica (HCC) M35.3    IBS (irritable bowel syndrome) K58.9    Hypertension with renal disease I12.9    CKD (chronic kidney disease), stage II N18.2    Avascular necrosis of hip ([facility]) M87.059    Abnormal LFTs R94.5    Vitamin D deficiency E55.9    Recurrent depression ([facility]) F33.9    Primary osteoarthritis involving multiple joints M89.49    Sleep disturbance G47.9    Lumbar disc disease M51.9    Spinal stenosis, lumbar M48.061    Dyspnea on exertion R06.00    Spinal stenosis, cervical region M48.02    Cervical stenosis of spine M48.02    Interstitial lung disease ([facility]) J84.9    Acute bronchitis J20.9    Non-recurrent acute suppurative otitis media of left ear without spontaneous rupture of tympanic membrane H66.002    Impacted cerumen of right ear H61.21    Other fatigue R53.83    Gait instability R26.81    Glossitis K14.0    Cough R05    Alcohol screening Z13.39    Primary osteoarthritis of right shoulder M19.011    Acute non-recurrent maxillary sinusitis J01.00    Tremor R25.1    Severe obesity (HCC) E66.01      Past Medical History:   Diagnosis Date    Abnormal LFTs 08/24/2017    FATTY LIVER    Arthritis     OSTEO    Avascular necrosis of hip ([facility] 08/24/2017    BILAT HIPS    Breast CA ([facility] 1989, 2001    BILATERAL; SURGERY, CHEMO    Chronic pain     CKD (chronic kidney disease), stage II 8/24/2017    Coagulation disorder ([facility] 1984    ITP  (DR CHU BRADSHAW) - PLATELETS DROPPED TO 5K    Depression     Diabetes ([facility]     TYPE 2; NIDDM    DJD (degenerative joint disease), multiple sites 8/24/2017    GI bleed 2008    HEMORRHOIDS    Hyperlipemia     Hypertension with renal disease 8/24/2017    IBS (irritable bowel syndrome) 8/24/2017    Ill-defined condition 2015    PNEUMONIA X2 - HOSPITALIZED IN 2015    Interstitial lung disease ([facility] 04/01/2019    Liver disease     FATTY LIVER    Myalgia 8/24/2017    On statin therapy 8/24/2017    Overactive bladder 8/24/2017    Pneumonia 04/2015    HOSPITALIZED 3 WEEKS.     Polymyalgia rheumatica (HCC) 8/24/2017    Prophylactic antibiotic 8/24/2017    Reflex abnormality     acid reflex    Rosacea       Allergies   Allergen Reactions    Metformin Diarrhea    Statins-Hmg-Coa Reductase Inhibitors Myalgia     Myalgia with  multiple statins  Takes pravachol     Codeine Rash     Rash on thighs    Darvon [Propoxyphene] Other (comments)     \"I see worms\"    Pcn [Penicillins] Rash    Sulfa (Sulfonamide Antibiotics) Rash      Family History   Problem Relation Age of Onset    Heart Disease Mother     Kidney Disease Mother     Lung Disease Mother         COPD    Diabetes Brother     Pacemaker Brother     Kidney Disease Brother     Hypertension Brother     Anesth Problems Neg Hx       Social History     Socioeconomic History    Marital status:      Spouse name: Not on file    Number of children: Not on file    Years of education: Not on file    Highest education level: Not on file   Occupational History    Not on file   Social Needs    Financial resource strain: Not on file    Food insecurity     Worry: Not on file     Inability: Not on file   [employer] needs     Medical: Not on file     Non-medical: Not on file   Tobacco Use    Smoking status: Never Smoker    Smokeless tobacco: Never Used   Substance and Sexual Activity    Alcohol use: No    Drug use: No    Sexual activity: Never   Lifestyle    Physical activity     Days per week: Not on file     Minutes per session: Not on file    Stress: Not on file   Relationships    Social connections     Talks on phone: Not on file     Gets together: Not on file     Attends [employer] service: Not on file     Active member of club or organization: Not on file     Attends meetings of clubs or organizations: Not on file     Relationship status: Not on file    Intimate partner violence     Fear of current or ex partner: Not on file     Emotionally abused: Not on file     Physically abused: Not on file     Forced sexual activity: Not on file   Other Topics Concern    Not on file   Social History Narrative    Not on file     Prior to Admission medications    Medication Sig Start Date End Date Taking? Authorizing Provider   cefUROXime (CEFTIN) 500 mg tablet Take 1 Tab by mouth two (2) times a day.  9/22/20  Yes Chiqui Mcclelland MD   furosemide (LASIX) 40 mg tablet TAKE 1 TABLET BY MOUTH DAILY 9/21/20  Yes Chiqui Mcclelland MD   doxycycline (VIBRAMYCIN) 100 mg capsule TAKE 1 (ONE) CAPSULE BY MOUTH TWICE DAILY WITH FOOD 8/27/20  Yes Provider, Historical   pravastatin (PRAVACHOL) 80 mg tablet T1T PO EVERY NIGHT FOR CHOLESTEROL 8/13/20  Yes Kali Duarte MD   buPROPion XL (Wellbutrin XL) 150 mg tablet Wellbutrin  mg 24 hr tablet, extended release 8/13/20  Yes Kali Duarte MD   Insulin Needles, Disposable, 31 gauge x 5/16\" ndle by SubCUTAneous route daily. Use with Lantus flex pen daily 8/13/20  Yes Kali Duarte MD   oxazepam [facility]) 15 mg capsule TAKE 2 CAPSULES BY MOUTH NIGHTLY AT BEDTIME 8/13/20  Yes Kali Duarte MD   sertraline (ZOLOFT) 100 mg tablet 1 tab QD 8/13/20  Yes Kali Duarte MD   benzonatate (TESSALON) 200 mg capsule Take 1 Cap by mouth three (3) times daily as needed for Cough. 8/13/20  Yes Kali Duarte MD   glucose blood VI test strips (True Metrix Glucose Test Strip) strip USE ONCE DAILY 8/13/20  Yes Kali Duarte MD   clindamycin (CLINDAGEL) 1 % topical gel Apply  to affected area two (2) times a day. use thin film on affected area 8/13/20  Yes Kali Duarte MD   predniSONE (DELTASONE) 10 mg tablet 10 mg BID  Patient taking differently: Three times a week 8/13/20  Yes Kali Duarte MD   pirocin [facility]) 2 % ointment Apply  to affected area three (3) times daily. 8/13/20  Yes Kali Duarte MD   clindamycin (CLEOCIN) 300 mg capsule Take 2 capsules 1 hour before dental procedure 8/13/20  Yes Kali Duarte MD   carvediloL (COREG) 6.25 mg tablet Take 1 tablet twice daily 8/13/20  Yes Kali Duarte MD   glimepiride (AMARYL) 4 mg tablet Take 1 Tab by mouth every morning. 8/10/20  Yes Kali Duarte MD   insulin glargine (LANTUS,BASAGLAR) 100 unit/mL (3 mL) inpn Take 30 units daily 8/4/20  Yes Kali Duarte MD   SITagliptin (JANUVIA) 100 mg tablet Take 1 Tab by mouth daily. 7/30/20  Yes Kali Duarte MD   hydroCHLOROthiazide (HYDRODIURIL) 25 mg tablet TAKE 1 TABLET EVERY DAY FOR FLUID AND BP 7/30/20  Yes Kali Duarte MD   B12-iodin-mag-zinc-nicole-herb193 50 mcg-75 mcg -100 mg cap Take  by mouth.    Yes Provider, Historical   fenofibrate nanocrystallized (TRICOR) 145 mg tablet TAKE 1 TABLET EVERY DAY 1/15/20  Yes Adrianna Galindo MD   pantoprazole (PROTONIX) 40 mg tablet Take 40 mg by mouth daily. 11/21/19  Yes Provider, Historical   montelukast sodium (SINGULAIR PO) Take  by mouth. Yes Provider, Historical   cholecalciferol (VITAMIN D3) 1,000 unit cap Take  by mouth daily. Indications: unknown of the mg. Yes Provider, Historical   biotin-silicon diox-L-cysteine 3,000 mcg -100 mg-50 mg TbER Take 1 Tab by mouth daily. Yes Provider, Historical        Review of Systems              Constitutional:  She denies fever, weight loss, sweats or fatigue. EYES: No blurred or double vision,               ENT: Postnasal drainage with head and nasal congestion, no headache or dizziness. No difficulty with               swallowing, taste, speech or smell. Respiratory: Positive for cough from drainage without wheezing or shortness of breath. No sputum production. Cardiac:  Denies chest pain, palpitations, unexplained indigestion, syncope, edema, PND or orthopnea. GI:  No changes in bowel movements, no abdominal pain, no bloating, anorexia, nausea, vomiting or heartburn. :  No frequency or dysuria. Denies incontinence or sexual dysfunction. Extremities:  No joint pain, stiffness or swelling  Back:.no pain or soreness  Skin:  No recent rashes or mole changes. Neurological:  No numbness, tingling, burning paresthesias or loss of motor strength. No syncope, dizziness, frequent headaches or memory loss. Hematologic:  No easy bruising  Lymphatic: No lymph node enlargement    Objective:     Vitals:    09/22/20 1507   BP: (!) 144/92   Pulse: 79   Resp: 20   Temp: 98 °F (36.7 °C)   TempSrc: Oral   SpO2: 96%   Weight: 200 lb 14.4 oz (91.1 kg)   Height: 5' 3\" (1.6 m)   PainSc:   2   PainLoc: Back       Body mass index is 35.59 kg/m².    Physical Examination:              General Appearance: Well-developed, well-nourished, no acute distress. HEENT:      Ears:  The TMs and ear canals were clear. Eyes:  The pupillary responses were normal.  Extraocular muscle function intact. Lids and conjunctiva not injected. Funduscopic exam revealed sharp disc margins. Nares: Mildly inflamed and edematous  Pharynx:  Clear with teeth in good repair. No masses were noted. Neck:  Supple without thyromegaly or adenopathy. No JVD noted. No carotid                bruits. Lungs:  Clear to auscultation and percussion. Cardiac:  Regular rate and rhythm without murmur. PMI not displaced. No gallop, rub or click. Abdominal: Soft, non-tender, no hepata-spleenomegally or masses  Extremities:  No clubbing, cyanosis or edema. Skin:  No rash or unusual mole changes noted. Lymph Nodes:  None felt in the cervical, supraclavicular, axillary or inguinal region. Neurological: . DTRs 2+ and symmetric. Station and gait normal.   Hematologic:   No purpura or petechiae        Assessment/Plan:         1. Acute bronchitis, unspecified organism    2. Interstitial lung disease ([address]        Impressions/Plan:  Impression  1. Acute bronchitis when asked to take a prednisone 20 mg a day for the next 3 days then 10 mg a day for 3 days and then go to 10 every other day to where she has been at her baseline. I am also going placed on Ceftin twice a day for 10 days  2. Interstitial lung disease I do not hear any wheezes on exam today  Recheck if not resolved and certainly his symptoms seem to get worse. Orders Placed This Encounter    cefUROXime (CEFTIN) 500 mg tablet       Follow-up and Dispositions    · Return TBD. No results found for any visits on 09/22/20. Brenna Fleming MD    The patient was given after the visit summary the patient verbalized an understanding of the plans and problems as explained.

## 2020-09-22 NOTE — PATIENT INSTRUCTIONS
Bronchitis: Care Instructions Your Care Instructions Bronchitis is inflammation of the bronchial tubes, which carry air to the lungs. The tubes swell and produce mucus, or phlegm. The mucus and inflamed bronchial tubes make you cough. You may have trouble breathing. Most cases of bronchitis are caused by viruses like those that cause colds. Antibiotics usually do not help and they may be harmful. Bronchitis usually develops rapidly and lasts about 2 to 3 weeks in otherwise healthy people. Follow-up care is a key part of your treatment and safety. Be sure to make and go to all appointments, and call your doctor if you are having problems. It's also a good idea to know your test results and keep a list of the medicines you take. How can you care for yourself at home? · Take all medicines exactly as prescribed. Call your doctor if you think you are having a problem with your medicine. · Get some extra rest. 
· Take an over-the-counter pain medicine, such as acetaminophen (Tylenol), ibuprofen (Advil, Motrin), or naproxen (Aleve) to reduce fever and relieve body aches. Read and follow all instructions on the label. · Do not take two or more pain medicines at the same time unless the doctor told you to. Many pain medicines have acetaminophen, which is Tylenol. Too much acetaminophen (Tylenol) can be harmful. · Take an over-the-counter cough medicine that contains dextromethorphan to help quiet a dry, hacking cough so that you can sleep. Avoid cough medicines that have more than one active ingredient. Read and follow all instructions on the label. · Breathe moist air from a humidifier, hot shower, or sink filled with hot water. The heat and moisture will thin mucus so you can cough it out. · Do not smoke. Smoking can make bronchitis worse. If you need help quitting, talk to your doctor about stop-smoking programs and medicines. These can increase your chances of quitting for good. When should you call for help? Call 911 anytime you think you may need emergency care. For example, call if: 
  · You have severe trouble breathing. Call your doctor now or seek immediate medical care if: 
  · You have new or worse trouble breathing.  
  · You cough up dark brown or bloody mucus (sputum).  
  · You have a new or higher fever.  
  · You have a new rash. Watch closely for changes in your health, and be sure to contact your doctor if: 
  · You cough more deeply or more often, especially if you notice more mucus or a change in the color of your mucus.  
  · You are not getting better as expected. Where can you learn more? Go to http://malia-lincoln.info/ Enter H333 in the search box to learn more about \"Bronchitis: Care Instructions. \" Current as of: February 24, 2020               Content Version: 12.6 © 4802-6245 [employer]. Care instructions adapted under license by [employer] (which disclaims liability or warranty for this information). If you have questions about a medical condition or this instruction, always ask your healthcare professional. [address] any warranty or liability for your use of this information.

## 2020-09-22 NOTE — PROGRESS NOTES
Chief Complaint   Patient presents with    Cold Symptoms     cough,nasal drainage x 4 days     Visit Vitals  BP (!) 144/92 (BP 1 Location: Left arm, BP Patient Position: Sitting)   Pulse 79   Temp 98 °F (36.7 °C) (Oral)   Resp 20   Ht 5' 3\" (1.6 m)   Wt 200 lb 14.4 oz (91.1 kg)   SpO2 96%   BMI 35.59 kg/m²     1. Have you been to the ER, urgent care clinic since your last visit? Hospitalized since your last visit? No    2. Have you seen or consulted any other health care providers outside of the [address] [place] [address] since your last visit? Include any pap smears or colon screening.  Dr. Bertha Carlisle

## 2020-10-20 ENCOUNTER — TRANSCRIBE ORDER (OUTPATIENT)
Dept: SCHEDULING | Age: 75
End: 2020-10-20

## 2020-10-20 DIAGNOSIS — J84.9 ILD (INTERSTITIAL LUNG DISEASE) (HCC): Primary | ICD-10-CM

## 2020-11-14 NOTE — PROGRESS NOTES
Chief Complaint   Patient presents with    Hypertension     3 month follow up    Diabetes    Cholesterol Problem       SUBJECTIVE:    Earmon Schilder is a 70 y.o. female who returns in follow-up for medical problems include hypertension, diabetes, hyperlipidemia, allergic rhinitis, interstitial lung disease, DJD, morbid obesity and other multiple medical problems. She was recently seen by pulmonologist and still is on prednisone 10 mg a day. The goal is to try to get to 10 every other day but right now she does not feel like she can get that low because of her breathing. She denies any chest pain, crease of her chronic shortness of breath, palpitations, PND, orthopnea or other cardiac or respiratory complaints. She notes no current GI or  complaints. She notes no headaches, dizziness or neurologic complaints. She has no current active arthritic complaints and there are no other complaints on complete review of systems. Current Outpatient Medications   Medication Sig Dispense Refill    clemastine 2.68 mg tab tablet Take 2.68 mg by mouth two (2) times a day.  omega 3-dha-epa-fish oil (Fish Oil) 100-160-1,000 mg cap Take  by mouth.  doxycycline (VIBRAMYCIN) 100 mg capsule TAKE 1 (ONE) CAPSULE BY MOUTH TWICE DAILY WITH FOOD      pravastatin (PRAVACHOL) 80 mg tablet T1T PO EVERY NIGHT FOR CHOLESTEROL 90 Tab 2    buPROPion XL (Wellbutrin XL) 150 mg tablet Wellbutrin  mg 24 hr tablet, extended release 90 Tab 3    Insulin Needles, Disposable, 31 gauge x 5/16\" ndle by SubCUTAneous route daily. Use with Lantus flex pen daily 1 Package 3    oxazepam (SERAX) 15 mg capsule TAKE 2 CAPSULES BY MOUTH NIGHTLY AT BEDTIME 180 Cap 1    sertraline (ZOLOFT) 100 mg tablet 1 tab QD 90 Tab 3    benzonatate (TESSALON) 200 mg capsule Take 1 Cap by mouth three (3) times daily as needed for Cough.  90 Cap 2    glucose blood VI test strips (True Metrix Glucose Test Strip) strip USE ONCE DAILY 100 Strip PRN    clindamycin (CLINDAGEL) 1 % topical gel Apply  to affected area two (2) times a day. use thin film on affected area 1 mL 2    predniSONE (DELTASONE) 10 mg tablet 10 mg BID (Patient taking differently: Three times a week) 180 Tab 0    mupirocin (BACTROBAN) 2 % ointment Apply  to affected area three (3) times daily. 22 g 1    clindamycin (CLEOCIN) 300 mg capsule Take 2 capsules 1 hour before dental procedure 10 Cap 0    carvediloL (COREG) 6.25 mg tablet Take 1 tablet twice daily 180 Tab 3    glimepiride (AMARYL) 4 mg tablet Take 1 Tab by mouth every morning. 90 Tab 3    insulin glargine (LANTUS,BASAGLAR) 100 unit/mL (3 mL) inpn Take 30 units daily 5 Adjustable Dose Pre-filled Pen Syringe 3    SITagliptin (JANUVIA) 100 mg tablet Take 1 Tab by mouth daily. 90 Tab 3    hydroCHLOROthiazide (HYDRODIURIL) 25 mg tablet TAKE 1 TABLET EVERY DAY FOR FLUID AND BP 90 Tab 2    B12-iodin-mag-zinc-nicole-herb193 50 mcg-75 mcg -100 mg cap Take  by mouth.  fenofibrate nanocrystallized (TRICOR) 145 mg tablet TAKE 1 TABLET EVERY DAY 30 Tab 11    pantoprazole (PROTONIX) 40 mg tablet Take 40 mg by mouth daily.  montelukast sodium (SINGULAIR PO) Take  by mouth.  cholecalciferol (VITAMIN D3) 1,000 unit cap Take  by mouth daily. Indications: unknown of the mg.        Past Medical History:   Diagnosis Date    Abnormal LFTs 08/24/2017    FATTY LIVER    Arthritis     OSTEO    Avascular necrosis of hip ([address] 08/24/2017    BILAT HIPS    Breast CA ([address] 1989, 2001    BILATERAL; SURGERY, CHEMO    Chronic pain     CKD (chronic kidney disease), stage II 8/24/2017    Coagulation disorder ([address] 1984    ITP  (DR CHU BRADSHAW) - PLATELETS DROPPED TO 5K    Depression     Diabetes ([address]     TYPE 2; NIDDM    DJD (degenerative joint disease), multiple sites 8/24/2017    GI bleed 2008    HEMORRHOIDS    Hyperlipemia     Hypertension with renal disease 8/24/2017    IBS (irritable bowel syndrome) 8/24/2017    Ill-defined condition 2015    PNEUMONIA X2 - HOSPITALIZED IN 2015    Interstitial lung disease ([facility] 04/01/2019    Liver disease     FATTY LIVER    Myalgia 8/24/2017    On statin therapy 8/24/2017    Overactive bladder 8/24/2017    Pneumonia 04/2015    HOSPITALIZED 3 WEEKS.     Polymyalgia rheumatica ([facility] 8/24/2017    Prophylactic antibiotic 8/24/2017    Reflex abnormality     acid reflex    Rosacea      Past Surgical History:   Procedure Laterality Date    BREAST SURGERY PROCEDURE UNLISTED  1993, 2002    RECONSTRUCTION X2    HX APPENDECTOMY  1950    HX BREAST BIOPSY Bilateral     HX CATARACT REMOVAL Bilateral     HX COLONOSCOPY      HX ENDOSCOPY      HX GI      COLONOSCOPY    HX HEENT      WISDOM TEETH    HX HYSTERECTOMY  2000S    HX MASTECTOMY Right 1989    HX MASTECTOMY Left 2001    HX ORTHOPAEDIC  2008    LUMBAR FUSION    HX ORTHOPAEDIC  2018    RE-DO LUMBAR FUSION, AND ADDED THORACIC FUSION    HX ORTHOPAEDIC  03/26/2019    neckectomy    HX TUBAL LIGATION  1981    HX UROLOGICAL  2000s    BLADDER SLING    TOTAL HIP ARTHROPLASTY Left 11/16/2016    ANTERIOR APPROACH, DR Gwendolyn De La Torre; (POSTOP: STANDS ONE INCH TALLER ON LEFT FOOT)    TOTAL HIP ARTHROPLASTY Right 12/2016    ANTERIOR APPROACH (DR JAIME)     Allergies   Allergen Reactions    Metformin Diarrhea    Statins-Hmg-Coa Reductase Inhibitors Myalgia     Myalgia with  multiple statins  Takes pravachol     Codeine Rash     Rash on thighs    Darvon [Propoxyphene] Other (comments)     \"I see worms\"    Pcn [Penicillins] Rash    Sulfa (Sulfonamide Antibiotics) Rash       REVIEW OF SYSTEMS:  General: negative for - chills or fever, or weight loss or gain  ENT: negative for - headaches, nasal congestion or tinnitus  Eyes: no blurred or visual changes  Neck: No stiffness or swollen nodes  Respiratory: negative for -change of her chronic cough, hemoptysis, increase of her chronic shortness of breath or wheezing  Cardiovascular : negative for - chest pain, edema, palpitations or shortness of breath  Gastrointestinal: negative for - abdominal pain, blood in stools, heartburn or nausea/vomiting  Genito-Urinary: no dysuria, trouble voiding, or hematuria  Musculoskeletal: negative for - gait disturbance, joint pain, joint stiffness or joint swelling  Neurological: no TIA or stroke symptoms  Hematologic: no bruises, no bleeding  Lymphatic: no swollen glands  Integument: no lumps, mole changes, nail changes or rash  Endocrine:no malaise/lethargy poly uria or polydipsia or unexpected weight changes        Social History     Socioeconomic History    Marital status:      Spouse name: Not on file    Number of children: Not on file    Years of education: Not on file    Highest education level: Not on file   Tobacco Use    Smoking status: Never Smoker    Smokeless tobacco: Never Used   Substance and Sexual Activity    Alcohol use: No    Drug use: No    Sexual activity: Never     Family History   Problem Relation Age of Onset    Heart Disease Mother     Kidney Disease Mother     Lung Disease Mother         COPD    Diabetes Brother     Pacemaker Brother     Kidney Disease Brother     Hypertension Brother     Anesth Problems Neg Hx        OBJECTIVE:     Visit Vitals  /88 (BP 1 Location: Left arm, BP Patient Position: Sitting)   Pulse 86   Temp 98.5 °F (36.9 °C) (Oral)   Resp 19   Ht 5' 3\" (1.6 m)   Wt 211 lb 1.6 oz (95.8 kg)   SpO2 95%   BMI 37.39 kg/m²     CONSTITUTIONAL:   well nourished, appears age appropriate  EYES: sclera anicteric, PERRL, EOMI  ENMT:nares clear, moist mucous membranes, pharynx clear  NECK: supple.  Thyroid normal, No JVD or bruits  RESPIRATORY: Chest: clear to ascultation and percussion with overall decreased breath sound, normal inspiratory effort  CARDIOVASCULAR: Heart: regular rate and rhythm no murmurs, rubs or gallops, PMI not displaced, No thrills, no peripheral edema  GASTROINTESTINAL: Abdomen: non distended, soft, non tender, bowel sounds normal  HEMATOLOGIC: no purpura, petechiae or bruising  LYMPHATIC: No lymph node enlargemant  MUSCULOSKELETAL: Extremities: no active synovitis, pulse 1+   INTEGUMENT: No unusual rashes or suspicious skin lesions noted. Nails appear normal.  PERIPHERAL VASCULAR: normal pulses femoral, PT and DP  NEUROLOGIC: non-focal exam, A & O X 3  PSYCHIATRIC:, appropriate affect     ASSESSMENT:   1. Hypertension with renal disease    2. Controlled type 2 diabetes mellitus with stage 2 chronic kidney disease, with long-term current use of insulin ([address]    3. Mixed hyperlipidemia    4. CKD (chronic kidney disease), stage II    5. Primary osteoarthritis involving multiple joints    6. Interstitial lung disease (HCC)    7. Class 1 obesity due to excess calories without serious comorbidity with body mass index (BMI) of 33.0 to 33.9 in adult    8. Non-seasonal allergic rhinitis, unspecified trigger      Impression  1. Hypertension that is controlled so continue current therapy reviewed with her. 2.  Diabetes mellitus repeat status pending a prior lab reviewed now make adjustments if necessary. 3.  Hyperlipidemia prior lab reviewed repeat status pending and I will adjust if needed. 4.  CKD stage II repeat status pending  5. DJD that is stable  6. Interstitial lung disease that is stable  7. Obesity we did discuss diet, exercise and weight reduction for overall health benefit. 8.  Allergic rhinitis that is stable  I will call with lab results and make further recommendations or adjustments if necessary. Follow-up in 3 months or sooner if there is a problem.     PLAN:  .  Orders Placed This Encounter    METABOLIC PANEL, COMPREHENSIVE ([facility])    LIPID PANEL ([facility])    CK ([facility])    HEMOGLOBIN A1C W/O EAG ([facility])    clemastine 2.68 mg tab tablet    omega 3-dha-epa-fish oil (Fish Oil) 100-160-1,000 mg cap         ATTENTION:   This medical record was transcribed using an electronic medical records system. Although proofread, it may and can contain electronic and spelling errors. Other human spelling and other errors may be present. Corrections may be executed at a later time. Please feel free to contact us for any clarifications as needed. Follow-up and Dispositions    · Return in about 3 months (around 2/16/2021). No results found for any visits on 11/16/20. Mukesh Sloan MD    The patient verbalized understanding of the problems and plans as explained.

## 2020-11-16 ENCOUNTER — OFFICE VISIT (OUTPATIENT)
Dept: INTERNAL MEDICINE CLINIC | Age: 75
End: 2020-11-16
Payer: MEDICARE

## 2020-11-16 VITALS
BODY MASS INDEX: 37.4 KG/M2 | OXYGEN SATURATION: 95 % | HEIGHT: 63 IN | HEART RATE: 86 BPM | RESPIRATION RATE: 19 BRPM | TEMPERATURE: 98.5 F | DIASTOLIC BLOOD PRESSURE: 88 MMHG | WEIGHT: 211.1 LBS | SYSTOLIC BLOOD PRESSURE: 128 MMHG

## 2020-11-16 DIAGNOSIS — J30.89 NON-SEASONAL ALLERGIC RHINITIS, UNSPECIFIED TRIGGER: ICD-10-CM

## 2020-11-16 DIAGNOSIS — J84.9 INTERSTITIAL LUNG DISEASE (HCC): ICD-10-CM

## 2020-11-16 DIAGNOSIS — E78.2 MIXED HYPERLIPIDEMIA: ICD-10-CM

## 2020-11-16 DIAGNOSIS — N18.2 CONTROLLED TYPE 2 DIABETES MELLITUS WITH STAGE 2 CHRONIC KIDNEY DISEASE, WITH LONG-TERM CURRENT USE OF INSULIN (HCC): ICD-10-CM

## 2020-11-16 DIAGNOSIS — Z79.4 CONTROLLED TYPE 2 DIABETES MELLITUS WITH STAGE 2 CHRONIC KIDNEY DISEASE, WITH LONG-TERM CURRENT USE OF INSULIN (HCC): ICD-10-CM

## 2020-11-16 DIAGNOSIS — E11.22 CONTROLLED TYPE 2 DIABETES MELLITUS WITH STAGE 2 CHRONIC KIDNEY DISEASE, WITH LONG-TERM CURRENT USE OF INSULIN (HCC): ICD-10-CM

## 2020-11-16 DIAGNOSIS — M15.9 PRIMARY OSTEOARTHRITIS INVOLVING MULTIPLE JOINTS: ICD-10-CM

## 2020-11-16 DIAGNOSIS — N18.2 CKD (CHRONIC KIDNEY DISEASE), STAGE II: ICD-10-CM

## 2020-11-16 DIAGNOSIS — E66.09 CLASS 1 OBESITY DUE TO EXCESS CALORIES WITHOUT SERIOUS COMORBIDITY WITH BODY MASS INDEX (BMI) OF 33.0 TO 33.9 IN ADULT: ICD-10-CM

## 2020-11-16 DIAGNOSIS — I12.9 HYPERTENSION WITH RENAL DISEASE: Primary | ICD-10-CM

## 2020-11-16 LAB
A-G RATIO,AGRAT: 1.5 RATIO
ALBUMIN SERPL-MCNC: 4.3 G/DL (ref 3.9–5.4)
ALP SERPL-CCNC: 89 U/L (ref 38–126)
ALT SERPL-CCNC: 83 U/L (ref 0–35)
ANION GAP SERPL CALC-SCNC: 9 MMOL/L
AST SERPL W P-5'-P-CCNC: 58 U/L (ref 14–36)
BILIRUB SERPL-MCNC: 0.8 MG/DL (ref 0.2–1.3)
BUN SERPL-MCNC: 25 MG/DL (ref 7–17)
BUN/CREATININE RATIO,BUCR: 36 RATIO
CALCIUM SERPL-MCNC: 9.7 MG/DL (ref 8.4–10.2)
CHLORIDE SERPL-SCNC: 106 MMOL/L (ref 98–107)
CHOL/HDL RATIO,CHHD: 4 RATIO (ref 0–4)
CHOLEST SERPL-MCNC: 239 MG/DL (ref 0–200)
CK SERPL-CCNC: 67 U/L (ref 30–135)
CO2 SERPL-SCNC: 29 MMOL/L (ref 22–32)
CREAT SERPL-MCNC: 0.7 MG/DL (ref 0.7–1.2)
GLOBULIN,GLOB: 2.9
GLUCOSE SERPL-MCNC: 148 MG/DL (ref 65–105)
HBA1C MFR BLD HPLC: 9.2 % (ref 4–5.7)
HDLC SERPL-MCNC: 64 MG/DL (ref 35–130)
LDL/HDL RATIO,LDHD: 2 RATIO
LDLC SERPL CALC-MCNC: 119 MG/DL (ref 0–130)
POTASSIUM SERPL-SCNC: 4.1 MMOL/L (ref 3.6–5)
PROT SERPL-MCNC: 7.2 G/DL (ref 6.3–8.2)
SODIUM SERPL-SCNC: 144 MMOL/L (ref 137–145)
TRIGL SERPL-MCNC: 281 MG/DL (ref 0–200)
VLDLC SERPL CALC-MCNC: 56 MG/DL

## 2020-11-16 PROCEDURE — G9708 BILAT MAST/HX BI /UNILAT MAS: HCPCS | Performed by: INTERNAL MEDICINE

## 2020-11-16 PROCEDURE — 83036 HEMOGLOBIN GLYCOSYLATED A1C: CPT | Performed by: INTERNAL MEDICINE

## 2020-11-16 PROCEDURE — G8536 NO DOC ELDER MAL SCRN: HCPCS | Performed by: INTERNAL MEDICINE

## 2020-11-16 PROCEDURE — 99214 OFFICE O/P EST MOD 30 MIN: CPT | Performed by: INTERNAL MEDICINE

## 2020-11-16 PROCEDURE — G8427 DOCREV CUR MEDS BY ELIG CLIN: HCPCS | Performed by: INTERNAL MEDICINE

## 2020-11-16 PROCEDURE — 80053 COMPREHEN METABOLIC PANEL: CPT | Performed by: INTERNAL MEDICINE

## 2020-11-16 PROCEDURE — 1090F PRES/ABSN URINE INCON ASSESS: CPT | Performed by: INTERNAL MEDICINE

## 2020-11-16 PROCEDURE — G8399 PT W/DXA RESULTS DOCUMENT: HCPCS | Performed by: INTERNAL MEDICINE

## 2020-11-16 PROCEDURE — G8417 CALC BMI ABV UP PARAM F/U: HCPCS | Performed by: INTERNAL MEDICINE

## 2020-11-16 PROCEDURE — 82550 ASSAY OF CK (CPK): CPT | Performed by: INTERNAL MEDICINE

## 2020-11-16 PROCEDURE — G8754 DIAS BP LESS 90: HCPCS | Performed by: INTERNAL MEDICINE

## 2020-11-16 PROCEDURE — G8752 SYS BP LESS 140: HCPCS | Performed by: INTERNAL MEDICINE

## 2020-11-16 PROCEDURE — 3046F HEMOGLOBIN A1C LEVEL >9.0%: CPT | Performed by: INTERNAL MEDICINE

## 2020-11-16 PROCEDURE — 3017F COLORECTAL CA SCREEN DOC REV: CPT | Performed by: INTERNAL MEDICINE

## 2020-11-16 PROCEDURE — G9717 DOC PT DX DEP/BP F/U NT REQ: HCPCS | Performed by: INTERNAL MEDICINE

## 2020-11-16 PROCEDURE — 2022F DILAT RTA XM EVC RTNOPTHY: CPT | Performed by: INTERNAL MEDICINE

## 2020-11-16 PROCEDURE — 1101F PT FALLS ASSESS-DOCD LE1/YR: CPT | Performed by: INTERNAL MEDICINE

## 2020-11-16 PROCEDURE — 80061 LIPID PANEL: CPT | Performed by: INTERNAL MEDICINE

## 2020-11-16 RX ORDER — CLEMASTINE FUMARATE 2.68 MG/1
2.68 TABLET ORAL 2 TIMES DAILY
COMMUNITY
End: 2022-10-17 | Stop reason: ALTCHOICE

## 2020-11-16 RX ORDER — CHOLECALCIFEROL (VITAMIN D3) 50 MCG
CAPSULE ORAL
COMMUNITY
End: 2021-10-06 | Stop reason: ALTCHOICE

## 2020-11-16 NOTE — PROGRESS NOTES
Chief Complaint   Patient presents with    Hypertension     3 month follow up    Diabetes    Cholesterol Problem     Visit Vitals  /88 (BP 1 Location: Left arm, BP Patient Position: Sitting)   Pulse 86   Temp 98.5 °F (36.9 °C) (Oral)   Resp 19   Ht 5' 3\" (1.6 m)   Wt 211 lb 1.6 oz (95.8 kg)   SpO2 95%   BMI 37.39 kg/m²     1. Have you been to the ER, urgent care clinic since your last visit? Hospitalized since your last visit? No    2. Have you seen or consulted any other health care providers outside of the [address] [place] [address] since your last visit? Include any pap smears or colon screening.  Dr Childs-pulmonology  [address]

## 2020-11-16 NOTE — PROGRESS NOTES
Blood sugar, triglycerides and glycol still up although glycol is better it is still far from goal.  Exactly what is she taking regularly for her diabetes now. Certainly prednisone can play some role in this but it should not make the glycol that high.

## 2020-11-16 NOTE — PATIENT INSTRUCTIONS
Learning About Anxiety Disorders  What are anxiety disorders? Anxiety disorders are a type of medical problem. They cause severe anxiety. When you feel anxious, you feel that something bad is about to happen. This feeling interferes with your life. These disorders include:  · Generalized anxiety disorder. You feel worried and stressed about many everyday events and activities. This goes on for several months and disrupts your life on most days. · Panic disorder. You have repeated panic attacks. A panic attack is a sudden, intense fear or anxiety. It may make you feel short of breath. Your heart may pound. · Social anxiety disorder. You feel very anxious about what you will say or do in front of people. For example, you may be scared to talk or eat in public. This problem affects your daily life. · Phobias. You are very scared of a specific object, situation, or activity. For example, you may fear spiders, high places, or small spaces. What are the symptoms? Generalized anxiety disorder  Symptoms may include:  · Feeling worried and stressed about many things almost every day. · Feeling tired or irritable. You may have a hard time concentrating. · Having headaches or muscle aches. · Having a hard time getting to sleep or staying asleep. Panic disorder  You may have repeated panic attacks when there is no reason for feeling afraid. You may change your daily activities because you worry that you will have another attack. Symptoms may include:  · Intense fear, terror, or anxiety. · Trouble breathing or very fast breathing. · Chest pain or tightness. · A heartbeat that races or is not regular. Social anxiety disorder  Symptoms may include:  · Fear about a social situation, such as eating in front of others or speaking in public. You may worry a lot. Or you may be afraid that something bad will happen. · Anxiety that can cause you to blush, sweat, and feel shaky.   · A heartbeat that is faster than normal.  · A hard time focusing. Phobias  Symptoms may include:  · More fear than most people of being around an object, being in a situation, or doing an activity. You might also be stressed about the chance of being around the thing you fear. · Worry about losing control, panicking, fainting, or having physical symptoms like a faster heartbeat when you are around the situation or object. How are these disorders treated? Anxiety disorders can be treated with medicines or counseling. A combination of both may be used. Medicines may include:  · Antidepressants. These may help your symptoms by keeping chemicals in your brain in balance. · Benzodiazepines. These may give you short-term relief of your symptoms. Some people use cognitive-behavioral therapy. A therapist helps you learn to change stressful or bad thoughts into helpful thoughts. Lead a healthy lifestyle  A healthy lifestyle may help you feel better. · Get at least 30 minutes of exercise on most days of the week. Walking is a good choice. · Eat a healthy diet. Include fruits, vegetables, lean proteins, and whole grains in your diet each day. · Try to go to bed at the same time every night. Try for 8 hours of sleep a night. · Find ways to manage stress. Try relaxation exercises. · Avoid alcohol and illegal drugs. Follow-up care is a key part of your treatment and safety. Be sure to make and go to all appointments, and call your doctor if you are having problems. It's also a good idea to know your test results and keep a list of the medicines you take. Where can you learn more? Go to http://www.gray.com/  Enter P416 in the search box to learn more about \"Learning About Anxiety Disorders. \"  Current as of: January 31, 2020               Content Version: 12.6  © 8567-8746 [employer].    Care instructions adapted under license by [employer] (which disclaims liability or warranty for this information). If you have questions about a medical condition or this instruction, always ask your healthcare professional. [place] [address] any warranty or liability for your use of this information.

## 2020-11-23 RX ORDER — GLIMEPIRIDE 4 MG/1
TABLET ORAL
Qty: 180 TAB | Refills: 3 | Status: SHIPPED | OUTPATIENT
Start: 2020-11-23 | End: 2021-11-09

## 2020-11-23 NOTE — TELEPHONE ENCOUNTER
RX refill request from the patient/pharmacy. Patient last seen 11- with labs, and next appt. scheduled for 02-  Requested Prescriptions     Pending Prescriptions Disp Refills    glimepiride (AMARYL) 4 mg tablet 180 Tab 3     Sig: Take one tablet twice a day. Bryson Newells

## 2020-11-23 NOTE — PROGRESS NOTES
Blood sugar, triglycerides and glycol still up although glycol is better it is still far from goal.  Exactly what is she taking regularly for her diabetes now. Certainly prednisone can play some role in this but it should not make the glycol that high. Discussed with Dr. Sathish Pedraza and wants her to continue the Lantus insullin at 30 units daily; Januvia 100 mg daily and increase the Glimperide to 4 mg BID. Patient states she has joined [employer] and plans to work on her diet.

## 2021-01-13 ENCOUNTER — HOSPITAL ENCOUNTER (OUTPATIENT)
Dept: CT IMAGING | Age: 76
Discharge: HOME | End: 2021-01-13
Attending: INTERNAL MEDICINE
Payer: MEDICARE

## 2021-01-13 DIAGNOSIS — J84.9 ILD (INTERSTITIAL LUNG DISEASE) (HCC): ICD-10-CM

## 2021-01-13 PROCEDURE — 71250 CT THORAX DX C-: CPT

## 2021-01-29 RX ORDER — PREDNISONE 10 MG/1
TABLET ORAL
Qty: 90 TAB | Refills: 3 | Status: SHIPPED | OUTPATIENT
Start: 2021-01-29 | End: 2021-08-06 | Stop reason: SDUPTHER

## 2021-01-29 NOTE — TELEPHONE ENCOUNTER
RX refill request from the patient/pharmacy. Patient last seen 11- with labs, and next appt. scheduled for 02-  Requested Prescriptions     Pending Prescriptions Disp Refills    predniSONE (DELTASONE) 10 mg tablet [Pharmacy Med Name: PREDNISONE TABS 10MG] 90 Tab 3     Sig: TAKE 1 [address], WEDNESDAY, [address]   .

## 2021-02-05 RX ORDER — BENZONATATE 200 MG/1
CAPSULE ORAL
Qty: 60 CAP | Refills: 1 | Status: SHIPPED | OUTPATIENT
Start: 2021-02-05 | End: 2021-08-30

## 2021-03-04 ENCOUNTER — VIRTUAL VISIT (OUTPATIENT)
Dept: NEUROLOGY | Age: 76
End: 2021-03-04
Payer: MEDICARE

## 2021-03-04 DIAGNOSIS — G25.0 ESSENTIAL TREMOR: Primary | ICD-10-CM

## 2021-03-04 DIAGNOSIS — G62.9 NEUROPATHY: ICD-10-CM

## 2021-03-04 DIAGNOSIS — M47.816 LUMBAR SPONDYLOSIS: ICD-10-CM

## 2021-03-04 DIAGNOSIS — G31.84 MCI (MILD COGNITIVE IMPAIRMENT): ICD-10-CM

## 2021-03-04 PROCEDURE — G8536 NO DOC ELDER MAL SCRN: HCPCS | Performed by: PSYCHIATRY & NEUROLOGY

## 2021-03-04 PROCEDURE — G9717 DOC PT DX DEP/BP F/U NT REQ: HCPCS | Performed by: PSYCHIATRY & NEUROLOGY

## 2021-03-04 PROCEDURE — G9708 BILAT MAST/HX BI /UNILAT MAS: HCPCS | Performed by: PSYCHIATRY & NEUROLOGY

## 2021-03-04 PROCEDURE — 1101F PT FALLS ASSESS-DOCD LE1/YR: CPT | Performed by: PSYCHIATRY & NEUROLOGY

## 2021-03-04 PROCEDURE — G8428 CUR MEDS NOT DOCUMENT: HCPCS | Performed by: PSYCHIATRY & NEUROLOGY

## 2021-03-04 PROCEDURE — G8417 CALC BMI ABV UP PARAM F/U: HCPCS | Performed by: PSYCHIATRY & NEUROLOGY

## 2021-03-04 PROCEDURE — G8399 PT W/DXA RESULTS DOCUMENT: HCPCS | Performed by: PSYCHIATRY & NEUROLOGY

## 2021-03-04 PROCEDURE — 1090F PRES/ABSN URINE INCON ASSESS: CPT | Performed by: PSYCHIATRY & NEUROLOGY

## 2021-03-04 PROCEDURE — 3017F COLORECTAL CA SCREEN DOC REV: CPT | Performed by: PSYCHIATRY & NEUROLOGY

## 2021-03-04 PROCEDURE — 99214 OFFICE O/P EST MOD 30 MIN: CPT | Performed by: PSYCHIATRY & NEUROLOGY

## 2021-03-04 NOTE — PROGRESS NOTES
Neurology Note    Patient ID:  Elda Connor  [id number]  4 y.o.  1945      Date of Consultation:  March 4, 2021    Subjective: I have pain, but no more falls. History of Present Illness:   Elda Connor is a 78 y.o. female who returns to the neurology clinic at [facility] for an evaluation. She has been seen by multiple prior neurologist in the past and I last saw the patient on September 3, 2020. Please see my history of present illness, examination, and treatment base plan from that day. She does have a history of a length dependent neuropathy. 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 Since that visit,  She does continue to struggle with pain. She did follow-up with the pain specialist and she did receive an injection but provided very little in the way of pain. It was recommended that she receive physical therapy but she has not started that therapy yet. She is getting therapy currently for her bladder and will help to start therapy for gait training and stability once this therapy has completed. She still struggles with her walking. This is due partially due to her interstitial lung disease. The pain also contributes. She does continue to take prednisone. She does follow closely with her primary care doctor and there is still some difficulties in controlling both her cholesterol and her glucose. Her most recent hemoglobin A1c was 9.2 and total cholesterol was 239. Her triglycerides were 281. She does feel that her tremor persists but has not worsened. She will notice that sometimes when she is typing on her cell phone that she makes mistakes because of the tremor but she is not at the point where she would like to take a medication for this.     She does feel that she is becoming slightly depressed due to the pandemic and being home by herself  Past Medical History: Diagnosis Date    Abnormal LFTs 08/24/2017    FATTY LIVER    Arthritis     OSTEO    Avascular necrosis of hip ([facility] [address] 08/24/2017    BILAT HIPS    Breast CA ([facility] [address] 1989, 2001    BILATERAL; SURGERY, CHEMO    Chronic pain     CKD (chronic kidney disease), stage II 8/24/2017    Coagulation disorder ([facility] [address] 1984    ITP  (DR CHU BRADSHAW) - PLATELETS DROPPED TO 5K    Depression     Diabetes ([facility] [address]     TYPE 2; NIDDM    DJD (degenerative joint disease), multiple sites 8/24/2017    GI bleed 2008    HEMORRHOIDS    Hyperlipemia     Hypertension with renal disease 8/24/2017    IBS (irritable bowel syndrome) 8/24/2017    Ill-defined condition 2015    PNEUMONIA X2 - HOSPITALIZED IN 2015    Interstitial lung disease ([facility] [address] 04/01/2019    Liver disease     FATTY LIVER    Myalgia 8/24/2017    On statin therapy 8/24/2017    Overactive bladder 8/24/2017    Pneumonia 04/2015    HOSPITALIZED 3 WEEKS.     Polymyalgia rheumatica ([facility] [address] 8/24/2017    Prophylactic antibiotic 8/24/2017    Reflex abnormality     acid reflex    Rosacea         Past Surgical History:   Procedure Laterality Date    BREAST SURGERY PROCEDURE UNLISTED  1993, 2002    RECONSTRUCTION X2    HX APPENDECTOMY  1950    HX BREAST BIOPSY Bilateral     HX CATARACT REMOVAL Bilateral     HX COLONOSCOPY      HX ENDOSCOPY      HX GI      COLONOSCOPY    HX HEENT      WISDOM TEETH    HX HYSTERECTOMY  2000S    HX MASTECTOMY Right 1989    HX MASTECTOMY Left 2001    HX ORTHOPAEDIC  2008    LUMBAR FUSION    HX ORTHOPAEDIC  2018    RE-DO LUMBAR FUSION, AND ADDED THORACIC FUSION    HX ORTHOPAEDIC  03/26/2019    neckectomy    HX TUBAL LIGATION  1981    HX UROLOGICAL  2000s    BLADDER SLING    TOTAL HIP ARTHROPLASTY Left 11/16/2016    ANTERIOR APPROACH, DR Gwendolyn De La Torre; (POSTOP: STANDS ONE INCH TALLER ON LEFT FOOT)    TOTAL HIP ARTHROPLASTY Right 12/2016    ANTERIOR APPROACH (DR Gwendolyn De La Torre)        Family History   Problem Relation Age of Onset    Heart Disease Mother  Kidney Disease Mother     Lung Disease Mother         COPD    Diabetes Brother     Pacemaker Brother     Kidney Disease Brother     Hypertension Brother     Anesth Problems Neg Hx         Social History     Tobacco Use    Smoking status: Never Smoker    Smokeless tobacco: Never Used   Substance Use Topics    Alcohol use: No        Allergies   Allergen Reactions    Metformin Diarrhea    Statins-Hmg-Coa Reductase Inhibitors Myalgia     Myalgia with  multiple statins  Takes pravachol     Codeine Rash     Rash on thighs    Darvon [Propoxyphene] Other (comments)     \"I see worms\"    Pcn [Penicillins] Rash    Sulfa (Sulfonamide Antibiotics) Rash        Prior to Admission medications    Medication Sig Start Date End Date Taking? Authorizing Provider   benzonatate (TESSALON) 200 mg capsule TAKE 1 CAPSULE BY MOUTH THREE TIMES DAILY AS NEEDED FOR COUGH - SWALLOW CAPSULE WHOLE (DO NOT CRUSH) 2/5/21  Yes Chai Jean-Baptiste MD   predniSONE (DELTASONE) 10 mg tablet TAKE 1 TABLET EVERY MONDAY, WEDNESDAY, AND FRIDAY 1/29/21  Yes Chai Jean-Baptiste MD   glimepiride (AMARYL) 4 mg tablet Take one tablet twice a day. 11/23/20  Yes Chai Jean-Baptiste MD   clemastine 2.68 mg tab tablet Take 2.68 mg by mouth two (2) times a day. Yes Provider, Historical   omega 3-dha-epa-fish oil (Fish Oil) 100-160-1,000 mg cap Take  by mouth.    Yes Provider, Historical   pravastatin (PRAVACHOL) 80 mg tablet T1T PO EVERY NIGHT FOR CHOLESTEROL 8/13/20  Yes Chai Jean-Baptiste MD   buPROPion XL (Wellbutrin XL) 150 mg tablet Wellbutrin  mg 24 hr tablet, extended release 8/13/20  Yes Chai Jean-Baptiste MD   oxazepam (SERAX) 15 mg capsule TAKE 2 CAPSULES BY MOUTH NIGHTLY AT BEDTIME 8/13/20  Yes Chai Jean-Baptiste MD   sertraline (ZOLOFT) 100 mg tablet 1 tab QD 8/13/20  Yes Chai Jean-Baptiste MD   glucose blood VI test strips (True Metrix Glucose Test Strip) strip USE ONCE DAILY 8/13/20  Yes Chai Jean-Baptiste MD   clindamycin (CLINDAGEL) 1 % topical gel Apply  to affected area two (2) times a day. use thin film on affected area 8/13/20  Yes Jailene Thornton MD   mupirocin Naheedtabatha Rodriguez) 2 % ointment Apply  to affected area three (3) times daily. 8/13/20  Yes Jailene Thornton MD   carvediloL (COREG) 6.25 mg tablet Take 1 tablet twice daily 8/13/20  Yes Jailene Thornton MD   SITagliptin (JANUVIA) 100 mg tablet Take 1 Tab by mouth daily. 7/30/20  Yes Jailene Thornton MD   hydroCHLOROthiazide (HYDRODIURIL) 25 mg tablet TAKE 1 TABLET EVERY DAY FOR FLUID AND BP 7/30/20  Yes Jailene Thornton MD   pantoprazole (PROTONIX) 40 mg tablet Take 40 mg by mouth daily. 11/21/19  Yes Provider, Historical   montelukast sodium (SINGULAIR PO) Take  by mouth. Yes Provider, Historical   cholecalciferol (VITAMIN D3) 1,000 unit cap Take  by mouth daily. Indications: unknown of the mg. Yes Provider, Historical   doxycycline (VIBRAMYCIN) 100 mg capsule TAKE 1 (ONE) CAPSULE BY MOUTH TWICE DAILY WITH FOOD 8/27/20   Provider, Historical   Insulin Needles, Disposable, 31 gauge x 5/16\" ndle by SubCUTAneous route daily. Use with Lantus flex pen daily 8/13/20   Jailene Thornton MD   clindamycin (CLEOCIN) 300 mg capsule Take 2 capsules 1 hour before dental procedure 8/13/20   Jailene Thornton MD   insulin glargine (LANTUS,BASAGLAR) 100 unit/mL (3 mL) inpn Take 30 units daily 8/4/20   Jailene Thornton MD   B12-iodin-mag-zinc-nicole-herb193 50 mcg-75 mcg -100 mg cap Take  by mouth.     Provider, Historical   fenofibrate nanocrystallized (TRICOR) 145 mg tablet TAKE 1 TABLET EVERY DAY 1/15/20   Jailene Thornton MD       Review of Systems:    General, constitutional: Weakness, fatigue  Eyes, vision: Blurry vision  Ears, nose, throat: negative  Cardiovascular, heart: negative  Respiratory: Shortness of breath due to her underlying pulmonary condition  Gastrointestinal: negative  Genitourinary: Incontinence  Musculoskeletal: Diffuse pain and weakness  Skin and integumentary: negative  Psychiatric: negative  Endocrine: negative  Neurological: negative, except for HPI  Hematologic/lymphatic: negative  Allergy/immunology: negative    Objective: There were no vitals taken for this visit. No vital signs were obtained via telemedicine today. There are limitations to the neurological examination due to the technological features of telemedicine  Physical Exam:      General:  appears well nourished in no acute distress  Skin: intact  Respiratory: no labored breathing      Neurological exam:    Awake, alert, oriented to person, place and time  Recent and remote memory were normal  Attention and concentration were intact  Language was intact. There was no aphasia  Speech: no dysarthria  Fund of knowledge was preserved    Cranial nerves: There was no masked facies  Visual fields were full  Eomi, no evidence of nystagmus  Facial motor: normal and symmetric  Hearing intact    Tongue: midline without fasciculations    Motor:   Pronator drift was absent  No evidence of fasciculations    Strength testing:  Strength appeared full    Sensory:  She has persistent distal sensory loss. Reflexes:  Unable to obtain via telemedicine    Cerebellar testing: There was no resting tremor but there was a postural and action based tremor in her upper extremities. It is slightly worsened in the right and left hand. She does open and close and finger tap without difficulty. She keeps a normal amplitude throughout. Gait: This was slow and steady in her house today.   This is slightly wide-based and does appear antalgic        Labs:     Lab Results   Component Value Date/Time    Hemoglobin A1c 9.2 (H) 11/16/2020 10:15 AM    Sodium 144 11/16/2020 10:16 AM    Potassium 4.1 11/16/2020 10:16 AM    Chloride 106 11/16/2020 10:16 AM    Glucose 148 (H) 11/16/2020 10:16 AM    BUN 25.0 (H) 11/16/2020 10:16 AM    Creatinine 0.7 11/16/2020 10:16 AM    Calcium 9.7 11/16/2020 10:16 AM    WBC 7.6 02/13/2020 11:37 AM    HCT 48.8 (H) 02/13/2020 11:37 AM    HGB 16.2 (H) 02/13/2020 11:37 AM    PLATELET 926 53/47/7645 11:37 AM       Imaging:    Results from [place] encounter on 02/17/20   MRI BRAIN WO CONT    Narrative EXAM: MRI BRAIN WO CONT    TECHNIQUE: Brain images including sagittal and axial T1-weighted, axial FLAIR,  T2-weighted, diffusion weighted, gradient echo,  coronal T2, axial thin section  fast images of the cranial nerves    IV CONTRAST:  None    INDICATION:  Unsteadiness on feet    COMPARISON:  CT head of 9/13/2019    FINDINGS:  BRAIN PARENCHYMA:  No acute or chronic infarct. No significant white matter  disease or other focal lesion. No parenchymal masses. Normal volume and  appearance of the posterior fossa. The more signal abnormality of the cranial  nerves. INTRACRANIAL HEMORRHAGE: None. CSF SPACES:  Normal in size and morphology for the patient's age. BASAL CISTERNS:  Patent. MIDLINE SHIFT: None. VASCULAR SYSTEM:  Normal flow voids. PARANASAL SINUSES AND MASTOID AIR CELLS:  No significant opacification. VISUALIZED ORBITS:  No significant abnormalities. VISUALIZED UPPER CERVICAL SPINE:  No significant abnormalities. SELLA:  No enlargement or  focal abnormality. SKULL BASE:  No significant abnormalities. Cerebellar tonsils in normal  position. CALVARIUM:  Intact. OTHER: 6 x 7 mm extra-axial hypointense focus in the medial aspect of the  anterior middle cranial fossa corresponding to an area of dense calcification on  the prior CT scan, most consistent with a small incidental meningioma. Projects  into the adjacent CSF space with no contact or mass effect on the adjacent  brain. Impression IMPRESSION:    1. No parenchymal abnormalities. 2. Subcentimeter densely calcified extra-axial lesion in the anterior middle  cranial fossa, most consistent with a small incidental meningioma.          Results from [place] encounter on 01/13/21   CT CHEST WO CONT    Narrative HIGH-RESOLUTION CT CHEST WITHOUT CONTRAST. 1/13/2021 2:07 PM     INDICATION: Interstitial lung disease. COMPARISON: 2/19/2020, 3/28/2019, 8/14/2018, 6/26/2017. TECHNIQUE: CT of the chest was performed without contrast. Supine and prone  inspiration, and supine expiration images were obtained. Coronal and sagittal  reconstructions were performed. CT dose reduction was achieved through use of a  standardized protocol tailored for this examination and automatic exposure  control for dose modulation. FINDINGS:  Mild subpleural fibrosis is stable or minimally progressed from 2017. There is a  slight basilar distribution, but no significant honeycombing to suggest  idiopathic pulmonary fibrosis/usual interstitial pneumonitis (IPF/UIP). No air  trapping on expiratory images. Subpleural lines persist on prone inspiratory  images. Incidental note is made of subpleural calcified granulomas in the  paramedian right lower lobe sub-6 mm nodules in the right middle and left lower  lobes are stable from 2017 and benign. The central airways are patent. No  pneumothorax or pleural effusion. The heart size is normal. Left anterior descending coronary calcifications are  mild. Calcified mediastinal lymph nodes are further evidence of old  granulomatous disease. Trace pericardial fluid in the aortic recess is likely  physiologic. Post left hemithyroidectomy. Hepatic steatosis is moderate. The  visualized unenhanced upper abdomen is otherwise normal.    Post bilateral mastectomies, with flap reconstruction on the right and silicone  implant reconstruction on the left. The silicone implant is ruptured,  predominantly with intracapsular rupture. However, there is a small volume of  free silicone/extracapsular rupture along the medial margin of the implant  (2-28, 602-21), with minimal free silicone posterior laterally (2-30, 601-146).   The extracapsular rupture is new or significantly increased from 2/19/2020. Lower cervical and thoracolumbar fusion hardware is partially imaged. The  thoracolumbar fusion level begins at T10, and there is severe degenerative disc  disease T9-T10 as a result. The left pedicle screw extends to the superior  endplate of Z75 (830-28, 602-83). There is severe bilateral glenohumeral  osteoarthritis. Impression IMPRESSION:  1. Mild subpleural pulmonary fibrosis. Stable or minimally progressed from 2017. 2. No significant honeycombing to suggest IPF/UIP. 3. Moderate hepatic steatosis. brain MRI from February 18, 2020. There was significant atrophy and periventricular white matter micro-ischemic chronic changes    Assessment and Plan:   The patient is a pleasant 78year-old female with multiple medical problems as noted below which impacts her neurological health who presents with worsening cognition, balance difficulties, pain, and weakness. Her neurological examination does reveal persistent pain in her lower extremity, a distal peripheral neuropathy, and an essential tremor. Lower extremity weakness/balance difficulties: This is related to her Peripheral neuropathy and multiple spinal surgeries. There is also multiple medical problems that can attribute to her overall weakness. Her polymyalgia rheumatica can also be contributing. The neuropathy may be multifactorial including related to her multiple medical conditions including her diabetes which is not under optimal control. I did stress to her the importance of working on controlling her diabetes and how this can impact her neuropathy and make it worse. Cognitive slowing:    Her MRI does reveal mild atrophy but no tumor or significant stroke. We talked about risk of vascular dementia and importance of performing cognitively stimulating activity daily. She has not begun a cognitively stimulating activities but will work on doing so.     Vascular disease  Stroke risk factors include: htn, dm, cholesterol  Continue 81 mg aspirin daily    Hypertension: Continue aggressive control with goal systolic blood pressure under 140  Diabetes: Continue aggressive control with endocrinology  Dyslipidemia: Continue to follow closely with primary care    I provided stroke education today in regards to risk factors for stroke and lifestyle modifications to help minimize the risk of future stroke. This included medication compliance, regular follow up with primary care physician,  and healthy lifestyle habits (nutrition/exercise)        Essential tremor:      She does not have any parkinsonian features on examination today. After discussion, we will hold on medication at this time. This is something I will be followed closely over time  Her prednisone may also be contributing to her tremor. If the medication is to be used, I would consider low-dose primidone. Coronavirus pandemic:  I did discuss with the patient at length the importance of social distancing and proper hygiene especially during these times. The patient is at a higher risk of having a more severe course of the disease and needs to follow all CDC recommendations. The patient acknowledges. She has received her first dose of the vaccine.           Patient Active Problem List   Diagnosis Code    Controlled type 2 diabetes mellitus with stage 2 chronic kidney disease, with long-term current use of insulin ([facility]) E11.22, N18.2, Z79.4    Non-seasonal allergic rhinitis J30.89    Class 1 obesity due to excess calories without serious comorbidity with body mass index (BMI) of 33.0 to 33.9 in adult E66.09, Z68.33    Rosacea L71.9    Mixed hyperlipidemia E78.2    Anxiety F41.9    GI bleed K92.2    Overactive bladder N32.81    Polymyalgia rheumatica ([facility]) M35.3    IBS (irritable bowel syndrome) K58.9    Hypertension with renal disease I12.9    CKD (chronic kidney disease), stage II N18.2    Avascular necrosis of hip ([facility]) M87.059  Abnormal LFTs R94.5    Vitamin D deficiency E55.9    Recurrent depression (HCC) F33.9    Primary osteoarthritis involving multiple joints M89.49    Sleep disturbance G47.9    Lumbar disc disease M51.9    Spinal stenosis, lumbar M48.061    Dyspnea on exertion R06.00    Spinal stenosis, cervical region M48.02    Cervical stenosis of spine M48.02    Interstitial lung disease (HCC) J84.9    Acute bronchitis J20.9    Non-recurrent acute suppurative otitis media of left ear without spontaneous rupture of tympanic membrane H66.002    Impacted cerumen of right ear H61.21    Other fatigue R53.83    Gait instability R26.81    Glossitis K14.0    Cough R05    Alcohol screening Z13.39    Primary osteoarthritis of right shoulder M19.011    Acute non-recurrent maxillary sinusitis J01.00    Tremor R25.1    Severe obesity ([facility]) E66.01       The patient should return to clinic in 7-8 months    Renewed medication:none today    I spent  30 minutes on the day of the encounter preparing the office visit by reviewing medical records, obtaining a history, performing examination, counseling and educating the patient and family members on diagnosis, ordering medications and tests, documenting in the clinical medical record, and coordinating the care for the patient. The patient had the ability to ask questions and all questions were answered.           Signed By:  Uri Pantoja DO FAAN    March 4, 2021

## 2021-03-23 RX ORDER — CALCIUM CITRATE/VITAMIN D3 200MG-6.25
TABLET ORAL
Qty: 100 STRIP | Refills: 3 | Status: SHIPPED | OUTPATIENT
Start: 2021-03-23 | End: 2022-06-20 | Stop reason: SDUPTHER

## 2021-03-23 NOTE — TELEPHONE ENCOUNTER
RX refill request from the patient/pharmacy. Patient last seen 03- with labs, and next appt. scheduled for 04-  Requested Prescriptions     Pending Prescriptions Disp Refills    glucose blood VI test strips (True Metrix Glucose Test Strip) strip [Pharmacy Med Name: TRUE METRIX TEST STRIPS] 100 Strip 3     Sig: USE ONCE DAILY AND RECORD RESULTS IN A NOTEBOOK ALSO RECORD LAST MEALTIME IN NOTEBOOK   .

## 2021-03-26 RX ORDER — NYSTATIN 100000 [USP'U]/ML
SUSPENSION ORAL
Qty: 200 ML | Refills: 0 | Status: SHIPPED | OUTPATIENT
Start: 2021-03-26 | End: 2021-10-06 | Stop reason: ALTCHOICE

## 2021-04-02 ENCOUNTER — OFFICE VISIT (OUTPATIENT)
Dept: INTERNAL MEDICINE CLINIC | Age: 76
End: 2021-04-02
Payer: MEDICARE

## 2021-04-02 VITALS
DIASTOLIC BLOOD PRESSURE: 80 MMHG | HEIGHT: 63 IN | OXYGEN SATURATION: 94 % | SYSTOLIC BLOOD PRESSURE: 120 MMHG | WEIGHT: 202.2 LBS | RESPIRATION RATE: 18 BRPM | TEMPERATURE: 98.6 F | BODY MASS INDEX: 35.83 KG/M2 | HEART RATE: 95 BPM

## 2021-04-02 DIAGNOSIS — E11.22 CONTROLLED TYPE 2 DIABETES MELLITUS WITH STAGE 2 CHRONIC KIDNEY DISEASE, WITH LONG-TERM CURRENT USE OF INSULIN (HCC): ICD-10-CM

## 2021-04-02 DIAGNOSIS — F33.9 RECURRENT DEPRESSION (HCC): ICD-10-CM

## 2021-04-02 DIAGNOSIS — Z79.4 CONTROLLED TYPE 2 DIABETES MELLITUS WITH STAGE 2 CHRONIC KIDNEY DISEASE, WITH LONG-TERM CURRENT USE OF INSULIN (HCC): ICD-10-CM

## 2021-04-02 DIAGNOSIS — Z13.39 ALCOHOL SCREENING: ICD-10-CM

## 2021-04-02 DIAGNOSIS — M15.9 PRIMARY OSTEOARTHRITIS INVOLVING MULTIPLE JOINTS: ICD-10-CM

## 2021-04-02 DIAGNOSIS — I12.9 HYPERTENSION WITH RENAL DISEASE: Primary | ICD-10-CM

## 2021-04-02 DIAGNOSIS — J30.89 NON-SEASONAL ALLERGIC RHINITIS, UNSPECIFIED TRIGGER: ICD-10-CM

## 2021-04-02 DIAGNOSIS — M87.059 AVASCULAR NECROSIS OF BONE OF HIP, UNSPECIFIED LATERALITY (HCC): ICD-10-CM

## 2021-04-02 DIAGNOSIS — Z00.00 MEDICARE ANNUAL WELLNESS VISIT, SUBSEQUENT: ICD-10-CM

## 2021-04-02 DIAGNOSIS — M35.3 POLYMYALGIA RHEUMATICA (HCC): ICD-10-CM

## 2021-04-02 DIAGNOSIS — E55.9 VITAMIN D DEFICIENCY: ICD-10-CM

## 2021-04-02 DIAGNOSIS — N39.0 URINARY TRACT INFECTION WITH HEMATURIA, SITE UNSPECIFIED: ICD-10-CM

## 2021-04-02 DIAGNOSIS — N18.2 CONTROLLED TYPE 2 DIABETES MELLITUS WITH STAGE 2 CHRONIC KIDNEY DISEASE, WITH LONG-TERM CURRENT USE OF INSULIN (HCC): ICD-10-CM

## 2021-04-02 DIAGNOSIS — N18.2 CKD (CHRONIC KIDNEY DISEASE), STAGE II: ICD-10-CM

## 2021-04-02 DIAGNOSIS — E66.01 SEVERE OBESITY (HCC): ICD-10-CM

## 2021-04-02 DIAGNOSIS — R31.9 URINARY TRACT INFECTION WITH HEMATURIA, SITE UNSPECIFIED: ICD-10-CM

## 2021-04-02 DIAGNOSIS — E66.09 CLASS 1 OBESITY DUE TO EXCESS CALORIES WITHOUT SERIOUS COMORBIDITY WITH BODY MASS INDEX (BMI) OF 33.0 TO 33.9 IN ADULT: ICD-10-CM

## 2021-04-02 DIAGNOSIS — E78.2 MIXED HYPERLIPIDEMIA: ICD-10-CM

## 2021-04-02 DIAGNOSIS — J84.9 INTERSTITIAL LUNG DISEASE (HCC): ICD-10-CM

## 2021-04-02 LAB
25(OH)D3 SERPL-MCNC: 55 NG/ML (ref 30–96)
A-G RATIO,AGRAT: 1.5 RATIO
ALBUMIN SERPL-MCNC: 4.7 G/DL (ref 3.9–5.4)
ALP SERPL-CCNC: 142 U/L (ref 38–126)
ALT SERPL-CCNC: 147 U/L (ref 0–35)
ANION GAP SERPL CALC-SCNC: 11 MMOL/L
AST SERPL W P-5'-P-CCNC: 95 U/L (ref 14–36)
BACTERIA,BACTU: ABNORMAL
BASOPHILS # BLD: 0.1 K/UL (ref 0–0.1)
BASOPHILS NFR BLD: 1 % (ref 0–1)
BILIRUB SERPL-MCNC: 0.9 MG/DL (ref 0.2–1.3)
BILIRUB UR QL: NEGATIVE
BUN SERPL-MCNC: 21 MG/DL (ref 7–17)
BUN/CREATININE RATIO,BUCR: 26 RATIO
CALCIUM SERPL-MCNC: 10.5 MG/DL (ref 8.4–10.2)
CHLORIDE SERPL-SCNC: 100 MMOL/L (ref 98–107)
CHOL/HDL RATIO,CHHD: 4 RATIO (ref 0–4)
CHOLEST SERPL-MCNC: 258 MG/DL (ref 0–200)
CK SERPL-CCNC: 44 U/L (ref 30–135)
CLARITY: ABNORMAL
CO2 SERPL-SCNC: 33 MMOL/L (ref 22–32)
COLOR UR: ABNORMAL
CREAT SERPL-MCNC: 0.8 MG/DL (ref 0.7–1.2)
DIFFERENTIAL METHOD BLD: ABNORMAL
EOSINOPHIL # BLD: 0.1 K/UL (ref 0–0.4)
EOSINOPHIL NFR BLD: 2 % (ref 0–7)
ERYTHROCYTE [DISTWIDTH] IN BLOOD BY AUTOMATED COUNT: 13.5 % (ref 11.5–14.5)
GLOBULIN,GLOB: 3.1
GLUCOSE 24H UR-MRATE: ABNORMAL G/(24.H)
GLUCOSE SERPL-MCNC: 195 MG/DL (ref 65–105)
HBA1C MFR BLD HPLC: 11.3 % (ref 4–5.7)
HCT VFR BLD AUTO: 48.4 % (ref 35–47)
HDLC SERPL-MCNC: 65 MG/DL (ref 35–130)
HGB BLD-MCNC: 16.1 G/DL (ref 11.5–16)
HGB UR QL STRIP: ABNORMAL
IMM GRANULOCYTES # BLD AUTO: 0.1 K/UL (ref 0–0.04)
IMM GRANULOCYTES NFR BLD AUTO: 1 % (ref 0–0.5)
KETONES UR QL STRIP.AUTO: NEGATIVE
LDL/HDL RATIO,LDHD: 2 RATIO
LDLC SERPL CALC-MCNC: 134 MG/DL (ref 0–130)
LEUKOCYTE ESTERASE: ABNORMAL
LYMPHOCYTES # BLD: 2.3 K/UL (ref 0.8–3.5)
LYMPHOCYTES NFR BLD: 25 % (ref 12–49)
MCH RBC QN AUTO: 32.3 PG (ref 26–34)
MCHC RBC AUTO-ENTMCNC: 33.3 G/DL (ref 30–36.5)
MCV RBC AUTO: 97.2 FL (ref 80–99)
MICROALBUMIN, URINE: 100 MG/L (ref 0–20)
MONOCYTES # BLD: 0.5 K/UL (ref 0–1)
MONOCYTES NFR BLD: 5 % (ref 5–13)
NEUTS SEG # BLD: 6.3 K/UL (ref 1.8–8)
NEUTS SEG NFR BLD: 66 % (ref 32–75)
NITRITE UR QL STRIP.AUTO: NEGATIVE
NRBC # BLD: 0 K/UL (ref 0–0.01)
NRBC BLD-RTO: 0 PER 100 WBC
PH UR STRIP: 5 [PH] (ref 5–7)
PLATELET # BLD AUTO: 274 K/UL (ref 150–400)
PMV BLD AUTO: 10.2 FL (ref 8.9–12.9)
POTASSIUM SERPL-SCNC: 3.8 MMOL/L (ref 3.6–5)
PROT SERPL-MCNC: 7.8 G/DL (ref 6.3–8.2)
PROT UR STRIP-MCNC: ABNORMAL MG/DL
RBC # BLD AUTO: 4.98 M/UL (ref 3.8–5.2)
RBC #/AREA URNS HPF: ABNORMAL #/HPF
SODIUM SERPL-SCNC: 144 MMOL/L (ref 137–145)
SP GR UR REFRACTOMETRY: 1.02 (ref 1–1.03)
SQUAMOUS EPITHELIAL CELLS: ABNORMAL
T4 FREE SERPL-MCNC: 1.03 NG/DL (ref 0.58–2.3)
TRIGL SERPL-MCNC: 297 MG/DL (ref 0–200)
TSH SERPL DL<=0.05 MIU/L-ACNC: 2.43 UIU/ML (ref 0.34–5.6)
UROBILINOGEN UR QL STRIP.AUTO: NEGATIVE
VLDLC SERPL CALC-MCNC: 59 MG/DL
WBC # BLD AUTO: 9.4 K/UL (ref 3.6–11)
WBC URNS QL MICRO: ABNORMAL #/HPF

## 2021-04-02 PROCEDURE — G8399 PT W/DXA RESULTS DOCUMENT: HCPCS | Performed by: INTERNAL MEDICINE

## 2021-04-02 PROCEDURE — G8427 DOCREV CUR MEDS BY ELIG CLIN: HCPCS | Performed by: INTERNAL MEDICINE

## 2021-04-02 PROCEDURE — 3017F COLORECTAL CA SCREEN DOC REV: CPT | Performed by: INTERNAL MEDICINE

## 2021-04-02 PROCEDURE — 84439 ASSAY OF FREE THYROXINE: CPT | Performed by: INTERNAL MEDICINE

## 2021-04-02 PROCEDURE — 2022F DILAT RTA XM EVC RTNOPTHY: CPT | Performed by: INTERNAL MEDICINE

## 2021-04-02 PROCEDURE — 82550 ASSAY OF CK (CPK): CPT | Performed by: INTERNAL MEDICINE

## 2021-04-02 PROCEDURE — 83036 HEMOGLOBIN GLYCOSYLATED A1C: CPT | Performed by: INTERNAL MEDICINE

## 2021-04-02 PROCEDURE — 80061 LIPID PANEL: CPT | Performed by: INTERNAL MEDICINE

## 2021-04-02 PROCEDURE — 1090F PRES/ABSN URINE INCON ASSESS: CPT | Performed by: INTERNAL MEDICINE

## 2021-04-02 PROCEDURE — 82044 UR ALBUMIN SEMIQUANTITATIVE: CPT | Performed by: INTERNAL MEDICINE

## 2021-04-02 PROCEDURE — 81003 URINALYSIS AUTO W/O SCOPE: CPT | Performed by: INTERNAL MEDICINE

## 2021-04-02 PROCEDURE — 80050 GENERAL HEALTH PANEL: CPT | Performed by: INTERNAL MEDICINE

## 2021-04-02 PROCEDURE — G0439 PPPS, SUBSEQ VISIT: HCPCS | Performed by: INTERNAL MEDICINE

## 2021-04-02 PROCEDURE — G8536 NO DOC ELDER MAL SCRN: HCPCS | Performed by: INTERNAL MEDICINE

## 2021-04-02 PROCEDURE — 82306 VITAMIN D 25 HYDROXY: CPT | Performed by: INTERNAL MEDICINE

## 2021-04-02 PROCEDURE — 99214 OFFICE O/P EST MOD 30 MIN: CPT | Performed by: INTERNAL MEDICINE

## 2021-04-02 PROCEDURE — 3046F HEMOGLOBIN A1C LEVEL >9.0%: CPT | Performed by: INTERNAL MEDICINE

## 2021-04-02 PROCEDURE — 1101F PT FALLS ASSESS-DOCD LE1/YR: CPT | Performed by: INTERNAL MEDICINE

## 2021-04-02 PROCEDURE — G9708 BILAT MAST/HX BI /UNILAT MAS: HCPCS | Performed by: INTERNAL MEDICINE

## 2021-04-02 PROCEDURE — G8417 CALC BMI ABV UP PARAM F/U: HCPCS | Performed by: INTERNAL MEDICINE

## 2021-04-02 RX ORDER — MENTHOL AND ZINC OXIDE .44; 20.625 G/100G; G/100G
OINTMENT TOPICAL
COMMUNITY
End: 2021-10-06 | Stop reason: ALTCHOICE

## 2021-04-02 NOTE — PROGRESS NOTES
HIPAA verified by two patient identifiers. Yamileth Moreno is a 78 y.o. female    Chief Complaint   Patient presents with    Annual Wellness Visit       Visit Vitals  /80 (BP 1 Location: Left upper arm, BP Patient Position: Sitting, BP Cuff Size: Large adult)   Pulse 95   Temp 98.6 °F (37 °C) (Oral)   Resp 18   Ht 5' 3\" (1.6 m)   Wt 202 lb 3.2 oz (91.7 kg)   SpO2 94%   BMI 35.82 kg/m²       Pain Scale: 2/10  Pain Location: Back      Health Maintenance Due   Topic Date Due    COVID-19 Vaccine (1) Never done    DTaP/Tdap/Td series (1 - Tdap) Never done    Shingrix Vaccine Age 50> (1 of 2) Never done    Medicare Yearly Exam  02/13/2021    Foot Exam Q1  02/13/2021    MICROALBUMIN Q1  02/13/2021    A1C test (Diabetic or Prediabetic)  02/16/2021         Coordination of Care Questionnaire:  :   1) Have you been to an emergency room, urgent care, or hospitalized since your last visit? If yes, where when, and reason for visit? no       2. Have seen or consulted any other health care provider since your last visit? If yes, where when, and reason for visit? NO      Patient is accompanied by self I have received verbal consent from Yamileth Moreno to discuss any/all medical information while they are present in the room.

## 2021-04-02 NOTE — PATIENT INSTRUCTIONS
Learning About ACE Inhibitors and ARBs for Diabetes Introduction ACE inhibitors and ARBs are medicines used to control blood pressure. They allow blood vessels to relax and open up. This lowers your blood pressure. When you have diabetes, taking an ACE inhibitor or ARB can help to: · Treat high blood pressure. Your risk of problems from diabetes goes up when you have high blood pressure. · Prevent or slow kidney damage. Diabetes can damage the blood vessels in the kidneys. High blood pressure can damage the kidneys, too. · Lower the risks of stroke and heart attack. Your risks go up when you have high blood pressure, heart disease, or both. An ACE inhibitor or ARB is a good choice for people with diabetes. Unlike some medicines, these don't affect blood sugar levels. Examples ACE inhibitors include: · Benazepril. · Lisinopril. · Ramipril. ARBs include: · Irbesartan. · Losartan. · Telmisartan. Possible side effects All medicines can cause side effects. Some side effects of ACE inhibitors include: 
· Low blood pressure. You may feel dizzy and weak. · A cough. · High potassium levels. · Swelling of your lips, tongue, or face. If the swelling is severe, you may need treatment right away. Severe swelling can make it hard to breathe, but this is rare. Some side effects of ARBs include: 
· Low blood pressure. You may feel dizzy and weak. · High potassium levels. You may have other side effects or reactions not listed here. Check the information that comes with your medicine. What to know about taking this medicine · Be safe with medicines. Take your medicines exactly as prescribed. Call your doctor if you think you are having a problem with your medicine. · Before starting an ACE inhibitor or ARB, tell your doctor if you: ? Use a salt substitute. ? Take diuretics or potassium tablets. · These medicines are not safe for pregnancy.  If you are pregnant or planning to be, talk to your doctor about a safe blood pressure medicine. · ACE inhibitors can cause a dry cough. If the cough is bad, talk to your doctor. Switching to an ARB is likely to help. · Taking some medicines together can cause problems. Tell your doctor or pharmacist all the medicines you take. This includes over-the-counter medicines, vitamins, herbal products, and supplements. · You may need regular blood and urine tests. Where can you learn more? Go to http://www.devine.com/ Enter M316 in the search box to learn more about \"Learning About ACE Inhibitors and ARBs for Diabetes. \" Current as of: August 31, 2020               Content Version: 12.8 © 9611-2247 [employer]. Care instructions adapted under license by [employer] (which disclaims liability or warranty for this information). If you have questions about a medical condition or this instruction, always ask your healthcare professional. [address] any warranty or liability for your use of this information.

## 2021-04-02 NOTE — PROGRESS NOTES
This is a Subsequent Medicare Annual Wellness Visit providing Personalized Prevention Plan Services (PPPS) (Performed 12 months after initial AWV and PPPS )    I have reviewed the patient's medical history in detail and updated the computerized patient record. She presents today for a Medicare subsequent annual wellness examination and screening questionnaire. She also returns in follow-up of her multiple medical problems include hypertension, hyperlipidemia, diabetes, IBS, history of GI bleed, rosacea, now with an acneiform rash on the face, allergic rhinitis, chronic interstitial lung disease, prior avascular necrosis of her hip, DJD, CKD stage II, overactive bladder, obesity, history of polymyalgia rheumatica, anxiety with depression, vitamin D deficiency and other multiple medical problems. She currently denies any chest pain or increase of her chronic dyspnea and notes no palpitations, PND, orthopnea or other cardiac or respiratory complaints. She notes no current GI or  complaints except she has chronic urinary incontinence and has seen a Dr. Keena Cunha from [facility] urology who is working with her on Kegel exercises to see if that would help some. She denies any headaches, dizziness or neurologic complaints except for chronic unchanged tremor for which she sees Dr. Holger Conti and is on a beta-blocker for that. She notes no change of her chronic arthritic complaints. There are no other complaints on complete review of systems.     History     Past Medical History:   Diagnosis Date    Abnormal LFTs 08/24/2017    FATTY LIVER    Arthritis     OSTEO    Avascular necrosis of hip ([address] 08/24/2017    BILAT HIPS    Breast CA ([address] 1989, 2001    BILATERAL; SURGERY, CHEMO    Chronic pain     CKD (chronic kidney disease), stage II 8/24/2017    Coagulation disorder ([address] 1984    ITP  (DR CHU BRADSHAW) - PLATELETS DROPPED TO 5K    Depression     Diabetes ([address]     TYPE 2; NIDDM    DJD (degenerative joint disease), multiple sites 8/24/2017    GI bleed 2008    HEMORRHOIDS    Hyperlipemia     Hypertension with renal disease 8/24/2017    IBS (irritable bowel syndrome) 8/24/2017    Ill-defined condition 2015    PNEUMONIA X2 - HOSPITALIZED IN 2015    Interstitial lung disease ([facility] 04/01/2019    Liver disease     FATTY LIVER    Myalgia 8/24/2017    On statin therapy 8/24/2017    Overactive bladder 8/24/2017    Pneumonia 04/2015    HOSPITALIZED 3 WEEKS.  Polymyalgia rheumatica ([facility] 8/24/2017    Prophylactic antibiotic 8/24/2017    Reflex abnormality     acid reflex    Rosacea       Past Surgical History:   Procedure Laterality Date    HX APPENDECTOMY  1950    HX BREAST BIOPSY Bilateral     HX CATARACT REMOVAL Bilateral     HX COLONOSCOPY      HX ENDOSCOPY      HX GI      COLONOSCOPY    HX HEENT      WISDOM TEETH    HX HYSTERECTOMY  2000S    HX MASTECTOMY Right 1989    HX MASTECTOMY Left 2001    HX ORTHOPAEDIC  2008    LUMBAR FUSION    HX ORTHOPAEDIC  2018    RE-DO LUMBAR FUSION, AND ADDED THORACIC FUSION    HX ORTHOPAEDIC  03/26/2019    neckectomy    HX TUBAL LIGATION  1981    HX UROLOGICAL  2000s    BLADDER SLING    WI BREAST SURGERY PROCEDURE UNLISTED  1993, 2002    RECONSTRUCTION X2    WI TOTAL HIP ARTHROPLASTY Left 11/16/2016    ANTERIOR APPROACH, DR Gwendolyn De La Torre; (POSTOP: STANDS ONE INCH TALLER ON LEFT FOOT)    WI TOTAL HIP ARTHROPLASTY Right 12/2016    ANTERIOR APPROACH (DR Gwendolyn De La Torre)     Social History     Tobacco Use    Smoking status: Never Smoker    Smokeless tobacco: Never Used   Substance Use Topics    Alcohol use: No    Drug use: No     Current Outpatient Medications   Medication Sig Dispense Refill    nystatin (MYCOSTATIN) 100,000 unit/mL suspension TAKE 5 ML BY MOUTH FOUR (4) TIMES DAILY.  SWISH AND SPIT NOTHING TO EAT OR DRINK FOR 30 MINUTES 200 mL 0    glucose blood VI test strips (True Metrix Glucose Test Strip) strip USE ONCE DAILY AND RECORD RESULTS IN A NOTEBOOK ALSO RECORD LAST MEALTIME IN NOTEBOOK 100 Strip 3    benzonatate (TESSALON) 200 mg capsule TAKE 1 CAPSULE BY MOUTH THREE TIMES DAILY AS NEEDED FOR COUGH - SWALLOW CAPSULE WHOLE (DO NOT CRUSH) 60 Cap 1    predniSONE (DELTASONE) 10 mg tablet TAKE 1 TABLET EVERY MONDAY, WEDNESDAY, AND FRIDAY 90 Tab 3    glimepiride (AMARYL) 4 mg tablet Take one tablet twice a day. 180 Tab 3    clemastine 2.68 mg tab tablet Take 2.68 mg by mouth two (2) times a day.  omega 3-dha-epa-fish oil (Fish Oil) 100-160-1,000 mg cap Take  by mouth.  doxycycline (VIBRAMYCIN) 100 mg capsule TAKE 1 (ONE) CAPSULE BY MOUTH TWICE DAILY WITH FOOD      pravastatin (PRAVACHOL) 80 mg tablet T1T PO EVERY NIGHT FOR CHOLESTEROL 90 Tab 2    buPROPion XL (Wellbutrin XL) 150 mg tablet Wellbutrin  mg 24 hr tablet, extended release 90 Tab 3    oxazepam (SERAX) 15 mg capsule TAKE 2 CAPSULES BY MOUTH NIGHTLY AT BEDTIME 180 Cap 1    sertraline (ZOLOFT) 100 mg tablet 1 tab QD 90 Tab 3    mupirocin (BACTROBAN) 2 % ointment Apply  to affected area three (3) times daily. 22 g 1    clindamycin (CLEOCIN) 300 mg capsule Take 2 capsules 1 hour before dental procedure 10 Cap 0    carvediloL (COREG) 6.25 mg tablet Take 1 tablet twice daily 180 Tab 3    SITagliptin (JANUVIA) 100 mg tablet Take 1 Tab by mouth daily. 90 Tab 3    hydroCHLOROthiazide (HYDRODIURIL) 25 mg tablet TAKE 1 TABLET EVERY DAY FOR FLUID AND BP 90 Tab 2    fenofibrate nanocrystallized (TRICOR) 145 mg tablet TAKE 1 TABLET EVERY DAY 30 Tab 11    pantoprazole (PROTONIX) 40 mg tablet Take 40 mg by mouth daily.  montelukast sodium (SINGULAIR PO) Take  by mouth.  cholecalciferol (VITAMIN D3) 1,000 unit cap Take  by mouth daily. Indications: unknown of the mg.  menthol-zinc oxide (CALMOSEPTINE) 0.44-20.6 % oint Calmoseptine 0.44 %-20.6 % topical ointment in packet   Apply 1 application by topical route.        Allergies   Allergen Reactions    Metformin Diarrhea    Statins-Hmg-Coa Reductase Inhibitors Myalgia     Myalgia with  multiple statins  Takes pravachol     Codeine Rash     Rash on thighs    Darvon [Propoxyphene] Other (comments)     \"I see worms\"    Pcn [Penicillins] Rash    Sulfa (Sulfonamide Antibiotics) Rash     Family History   Problem Relation Age of Onset    Heart Disease Mother     Kidney Disease Mother     Lung Disease Mother         COPD    Diabetes Brother     Pacemaker Brother     Kidney Disease Brother     Hypertension Brother     Anesth Problems Neg Hx        Patient Active Problem List    Diagnosis    [facility] disease [facility]    Primary osteoarthritis involving multiple joints    Hypertension with renal disease    CKD (chronic kidney disease), stage II    Controlled type 2 diabetes mellitus with stage 2 chronic kidney disease, with long-term current use of insulin (HCC)    Mixed hyperlipidemia    Vitamin D deficiency    IBS (irritable bowel syndrome)    Non-seasonal allergic rhinitis    Class 1 obesity due to excess calories without serious comorbidity with body mass index (BMI) of 33.0 to 33.9 in adult    Severe obesity (HCC)    Tremor    Acute non-recurrent maxillary sinusitis    Primary osteoarthritis of right shoulder    Alcohol screening    Medicare annual wellness visit, subsequent    Glossitis    Cough    Other fatigue    Gait instability    Non-recurrent acute suppurative otitis media of left ear without spontaneous rupture of tympanic membrane    Impacted cerumen of right ear    Acute bronchitis    Cervical stenosis of spine    Spinal stenosis, cervical region    Dyspnea on exertion    Spinal stenosis, lumbar    Lumbar disc disease    Sleep disturbance    Recurrent depression (HCC)    GI bleed    Overactive bladder    Polymyalgia rheumatica (HCC)    Avascular necrosis of hip ([facility]    Abnormal LFTs    Rosacea    Anxiety       Patient Care Team:  Prince Aguirre MD as PCP - General (Internal Medicine)  April Abarca Isabella Uriostegui MD as PCP - [facility] Empaneled Provider  Kailash Mckoy MD (Orthopedic Surgery)    Depression Risk Factor Screening:     3 most recent PHQ Screens 4/2/2021   Little interest or pleasure in doing things Not at all   Feeling down, depressed, irritable, or hopeless Not at all   Total Score PHQ 2 0   Trouble falling or staying asleep, or sleeping too much -   Feeling tired or having little energy -   Poor appetite, weight loss, or overeating -   Feeling bad about yourself - or that you are a failure or have let yourself or your family down -   Trouble concentrating on things such as school, work, reading, or watching TV -   Moving or speaking so slowly that other people could have noticed; or the opposite being so fidgety that others notice -   Thoughts of being better off dead, or hurting yourself in some way -   PHQ 9 Score -   How difficult have these problems made it for you to do your work, take care of your home and get along with others -     Alcohol Risk Factor Screening:   Do you average more than 1 drink per night or more than 7 drinks a week:  No    On any one occasion in the past three months have you have had more than 3 drinks containing alcohol:  No    Functional Ability and Level of Safety:     Fall Risk     Fall Risk Assessment, last 12 mths 4/2/2021   Able to walk? Yes   Fall in past 12 months? 0   Do you feel unsteady? 0   Are you worried about falling 0   Number of falls in past 12 months -   Fall with injury? -       Hearing Loss   mild    Activities of Daily Living   Self-care.    ADL Assessment 4/2/2021   Feeding yourself No Help Needed   Getting from bed to chair No Help Needed   Getting dressed No Help Needed   Bathing or showering No Help Needed   Walk across the room (includes cane/walker) No Help Needed   Using the telphone No Help Needed   Taking your medications No Help Needed   Preparing meals No Help Needed   Managing money (expenses/bills) No Help Needed   Moderately strenuous housework (laundry) No Help Needed   Shopping for personal items (toiletries/medicines) Help Needed   Shopping for groceries Help Needed   Driving No Help Needed   Climbing a flight of stairs No Help Needed   Getting to places beyond walking distances Help Needed       Abuse Screen   Patient is not abused    Social History     Social History Narrative    Not on file       Review of Systems      ROS:    Constitutional: She denies fevers, weight loss, sweats. Eyes: No blurred or double vision. ENT: No difficulty with swallowing, taste, speech or smell. NECK: no stiffness swelling or lymph node enlargement  Respiratory: No cough wheezing or or change of her chronic shortness of breath. Cardiovascular: Denies chest pain, palpitations, unexplained indigestion or syncope. Breast: She has noted no masses or lumps and no discharge or axillary swelling  Gastrointestinal:  No changes in bowel movements, no abdominal pain, no bloating. Genitourinary: No discharge or abnormal bleeding or pain  Extremities: No joint pain, stiffness or swelling. Neurological:  No numbness, tingling, burring paresthesias or loss of motor strength. No syncope, dizziness or frequent headache. No change of her chronic tremor  Skin:  No recent rashes or mole changes except a facial rash for which she is seeing a dermatologist.  Psychiatric/Behavioral:  Negative for depression. The patient is not nervous/anxious.    HEMATOLOGIC: no easy bruising or bleeding gums  Endocrine: no sweats of urinary frequency or excessive thirst    Physical Examination     Evaluation of Cognitive Function:  Mood/affect:  happy  Appearance: age appropriate  Family member/caregiver input: None     Vitals:    04/02/21 1449   BP: 120/80   Pulse: 95   Resp: 18   Temp: 98.6 °F (37 °C)   TempSrc: Oral   SpO2: 94%   Weight: 202 lb 3.2 oz (91.7 kg)   Height: 5' 3\" (1.6 m)   PainSc:   2   PainLoc: Back        PHYSICAL EXAM:    General appearance - alert, well appearing, and in no distress  Mental status - alert, oriented to person, place, and time  HEENT:  Ears - bilateral TM's and external ear canals clear  Eyes - pupillary responses were normal.  Extraocular muscle function intact. Lids and conjunctiva not injected. Fundoscopic exam revealed sharp disc margins. eye movements intact  Pharynx- clear with teeth in good repair. No masses were noted  Neck - supple without thyromegaly or burit. No JVD noted  Lungs - clear to auscultation and percussion  Cardiac- normal rate, regular rhythm without murmurs. PMI not displaced. No gallop, rub or click  Breast: deferred to GYN  Abdomen - flat, soft, non-tender without palpable organomegaly or mass. No pulsatile mass was felt, and not bruit was heard. Bowel sounds were active   Female - deferred to GYN  Rectal - deferred to GYN  Extremities -  no clubbing cyanosis or edema  Lymphatics - no palpable lymphadenopathy, no hepatosplenomegaly  Peripheral vascular - Dorsalis pedis and posterior tibial pulses felt without difficulty  Skin - no rash or unusual mole change noted. Acneiform lesions over her face under the mask that she is wearing  Neurological - Cranial nerves II-XII grossly intact. Motor strength 5/5. DTR's 2+ and symmetric.   Station and gait normal  Back exam - full range of motion, no tenderness, palpable spasm or pain on motion  Musculoskeletal - no joint tenderness, deformity or swelling  Hematologic: no purpura, petechiae or bruising  Results for orders placed or performed in visit on 48/18/77   METABOLIC PANEL, COMPREHENSIVE   Result Value Ref Range    Glucose 148 (H) 65 - 105 mg/dL    BUN 25.0 (H) 7.0 - 17.0 mg/dL    Creatinine 0.7 0.7 - 1.2 mg/dL    Sodium 144 137 - 145 mmol/L    Potassium 4.1 3.6 - 5.0 mmol/L    Chloride 106 98 - 107 mmol/L    CO2 29.0 22.0 - 32.0 mmol/L    Calcium 9.7 8.4 - 10.2 mg/dl    Protein, total 7.2 6.3 - 8.2 g/dL    Albumin 4.3 3.9 - 5.4 g/dL    ALT (SGPT) 83 (H) 0 - 35 U/L    AST (SGOT) 58.0 (H) 14.0 - 36.0 U/L    Alk. phosphatase 89 38 - 126 U/L    Bilirubin, total 0.8 0.2 - 1.3 mg/dL    BUN/Creatinine ratio 36 Ratio    GFR est AA >60 mL/min/1.73m2    GFR est non-AA >60 mL/min/1.73m2    Globulin 2.90     A-G Ratio 1.5 Ratio    Anion gap 9 mmol/L   LIPID PANEL   Result Value Ref Range    Cholesterol, total 239 (H) 0 - 200 mg/dL    Triglyceride 281 (H) 0 - 200 mg/dL    HDL Cholesterol 64 35 - 130 mg/dL    VLDL 56 mg/dL    LDL, calculated 119 0 - 130 mg/dL    CHOL/HDL Ratio 4 0 - 4 Ratio    LDL/HDL Ratio 2 Ratio   CK   Result Value Ref Range    CK 67.00 30.00 - 135.00 U/L   HEMOGLOBIN A1C W/O EAG   Result Value Ref Range    Hemoglobin A1c 9.2 (H) 4.0 - 5.7 %       Advice/Referrals/Counseling   Education and counseling provided:  Are appropriate based on today's review and evaluation  End-of-Life planning (with patient's consent)  Pneumococcal Vaccine  Influenza Vaccine  Colorectal cancer screening tests      Assessment/Plan     ASSESSMENT:   1. Hypertension with renal disease    2. Controlled type 2 diabetes mellitus with stage 2 chronic kidney disease, with long-term current use of insulin ([address]    3. Mixed hyperlipidemia    4. Interstitial lung disease ([address]    5. Primary osteoarthritis involving multiple joints    6. CKD (chronic kidney disease), stage II    7. Class 1 obesity due to excess calories without serious comorbidity with body mass index (BMI) of 33.0 to 33.9 in adult    8. Non-seasonal allergic rhinitis, unspecified trigger    9. Vitamin D deficiency    10. Avascular necrosis of bone of hip, unspecified laterality ([address]    11. Polymyalgia rheumatica ([address]    12. Recurrent depression ([address]    13. Severe obesity ([address]    14. Alcohol screening    15. Medicare annual wellness visit, subsequent      Impression  1. Hypertension that is controlled so continue current therapy reviewed with her.   2.  Diabetes mellitus repeat status pending a prior lab reviewed now make adjustments if necessary. 3.  Hyperlipidemia prior lab reviewed repeat status pending I will change if needed. 4. Interstitial lung disease O2 sat on room air today of 94%  5. DJD chronic but stable  6. CKD stage II repeat status pending  7. Obesity I did discuss diet, exercise and weight reduction for overall health benefit. 8.  Allergic rhinitis stable  9. Vitamin D deficiency repeat status pending  10. Avascular necrosis of the hip status post treatment  11. Polymyalgia rheumatica currently on long-term prednisone for interstitial lung disease and polymyalgia rheumatica has not been a problem. 12.  Depression that is stable  13. Obesity we did discuss diet, exercise and weight reduction for overall health benefit. 15.  Annual alcohol screening is done she currently no longer drinks alcohol. I did caution regarding more than 1 drink per day in females with increased cardiovascular risk and increased risk of liver disease as well as other GI effects. Medicare subsequent annual wellness examination screening questionnaire was completed today. The results were reviewed with her and her questions were answered. Lifestyle recommendations modifications discussed and made. I will call her lab results and make further recommendations or adjustments if necessary. Follow-up in 3 months or sooner if there is a problem. PLAN:  .  Orders Placed This Encounter    CBC WITH AUTOMATED DIFF    METABOLIC PANEL, COMPREHENSIVE ([facility])    LIPID PANEL ([facility])    CK ([facility])    HEMOGLOBIN A1C W/O EAG ([facility])    T4, FREE ([facility])    TSH 3RD GENERATION ([facility])    URINALYSIS W/O MICRO ([facility])    URINE, MICROALBUMIN, SEMIQUANTITATIVE ([facility])    VITAMIN D, 25 HYDROXY ([facility])    menthol-zinc oxide (CALMOSEPTINE) 0.44-20.6 % oint         ATTENTION:   This medical record was transcribed using an electronic medical records system. Although proofread, it may and can contain electronic and spelling errors. Other human spelling and other errors may be present. Corrections may be executed at a later time. Please feel free to contact us for any clarifications as needed. Follow-up and Dispositions    · Return in about 3 months (around 7/2/2021). Antoinette Marks MD    Recommended healthy diet low in carbohydrates, fats, sodium and cholesterol. Recommended regular cardiovascular exercise 3-6 times per week for 30-60 minutes daily. Current Outpatient Medications   Medication Sig Dispense Refill    nystatin (MYCOSTATIN) 100,000 unit/mL suspension TAKE 5 ML BY MOUTH FOUR (4) TIMES DAILY. SWISH AND SPIT NOTHING TO EAT OR DRINK FOR 30 MINUTES 200 mL 0    glucose blood VI test strips (True Metrix Glucose Test Strip) strip USE ONCE DAILY AND RECORD RESULTS IN A NOTEBOOK ALSO RECORD LAST MEALTIME IN NOTEBOOK 100 Strip 3    benzonatate (TESSALON) 200 mg capsule TAKE 1 CAPSULE BY MOUTH THREE TIMES DAILY AS NEEDED FOR COUGH - SWALLOW CAPSULE WHOLE (DO NOT CRUSH) 60 Cap 1    predniSONE (DELTASONE) 10 mg tablet TAKE 1 TABLET EVERY MONDAY, WEDNESDAY, AND FRIDAY 90 Tab 3    glimepiride (AMARYL) 4 mg tablet Take one tablet twice a day. 180 Tab 3    clemastine 2.68 mg tab tablet Take 2.68 mg by mouth two (2) times a day.  omega 3-dha-epa-fish oil (Fish Oil) 100-160-1,000 mg cap Take  by mouth.  doxycycline (VIBRAMYCIN) 100 mg capsule TAKE 1 (ONE) CAPSULE BY MOUTH TWICE DAILY WITH FOOD      pravastatin (PRAVACHOL) 80 mg tablet T1T PO EVERY NIGHT FOR CHOLESTEROL 90 Tab 2    buPROPion XL (Wellbutrin XL) 150 mg tablet Wellbutrin  mg 24 hr tablet, extended release 90 Tab 3    oxazepam (SERAX) 15 mg capsule TAKE 2 CAPSULES BY MOUTH NIGHTLY AT BEDTIME 180 Cap 1    sertraline (ZOLOFT) 100 mg tablet 1 tab QD 90 Tab 3    mupirocin (BACTROBAN) 2 % ointment Apply  to affected area three (3) times daily.  22 g 1    clindamycin (CLEOCIN) 300 mg capsule Take 2 capsules 1 hour before dental procedure 10 Cap 0    carvediloL (COREG) 6.25 mg tablet Take 1 tablet twice daily 180 Tab 3    SITagliptin (JANUVIA) 100 mg tablet Take 1 Tab by mouth daily. 90 Tab 3    hydroCHLOROthiazide (HYDRODIURIL) 25 mg tablet TAKE 1 TABLET EVERY DAY FOR FLUID AND BP 90 Tab 2    fenofibrate nanocrystallized (TRICOR) 145 mg tablet TAKE 1 TABLET EVERY DAY 30 Tab 11    pantoprazole (PROTONIX) 40 mg tablet Take 40 mg by mouth daily.  montelukast sodium (SINGULAIR PO) Take  by mouth.  cholecalciferol (VITAMIN D3) 1,000 unit cap Take  by mouth daily. Indications: unknown of the mg.  menthol-zinc oxide (CALMOSEPTINE) 0.44-20.6 % oint Calmoseptine 0.44 %-20.6 % topical ointment in packet   Apply 1 application by topical route. No results found for any visits on 04/02/21. Verbal and written instructions (see AVS) provided. Patient expresses understanding of diagnosis and treatment plan.     Woody Goldstein MD

## 2021-04-03 DIAGNOSIS — I10 HYPERTENSION, UNSPECIFIED TYPE: ICD-10-CM

## 2021-04-05 RX ORDER — HYDROCHLOROTHIAZIDE 25 MG/1
TABLET ORAL
Qty: 90 TAB | Refills: 3 | Status: SHIPPED | OUTPATIENT
Start: 2021-04-05 | End: 2022-03-31

## 2021-04-05 NOTE — TELEPHONE ENCOUNTER
RX refill request from the patient/pharmacy. Patient last seen 04- with labs, and next appt. scheduled for 07-  Requested Prescriptions     Pending Prescriptions Disp Refills    hydroCHLOROthiazide (HYDRODIURIL) 25 mg tablet [Pharmacy Med Name: HYDROCHLOROTHIAZIDE TABS 25MG] 90 Tab 3     Sig: TAKE 1 TABLET DAILY FOR FLUID AND BLOOD PRESSURE   .

## 2021-04-07 LAB
BACTERIA SPEC CULT: ABNORMAL
BACTERIA SPEC CULT: ABNORMAL
CC UR VC: ABNORMAL
SERVICE CMNT-IMP: ABNORMAL

## 2021-04-07 RX ORDER — CIPROFLOXACIN 500 MG/1
500 TABLET ORAL 2 TIMES DAILY
Qty: 14 TAB | Refills: 0 | Status: SHIPPED | OUTPATIENT
Start: 2021-04-07 | End: 2021-04-14

## 2021-04-07 NOTE — TELEPHONE ENCOUNTER
RX refill request from the patient/pharmacy. Patient last seen 04- with labs, and needs to get a f/up ua and culture after treatment. Requested Prescriptions     Pending Prescriptions Disp Refills    ciprofloxacin HCl (CIPRO) 500 mg tablet 14 Tab 0     Sig: Take 1 Tab by mouth two (2) times a day for 7 days.

## 2021-04-07 NOTE — PROGRESS NOTES
Extremely poor diabetes control with a glycohemoglobin 11.3 also elevated blood sugar as well as triglycerides and increased amount of protein in the urine although we do not need to problems. At this point I strongly recommend we go to insulin. Start with Lantus 20 units daily. Already on maximum dose of Amaryl and Januvia and if I remember correctly could not tolerate Metformin.

## 2021-04-07 NOTE — PROGRESS NOTES
UTI so Cipro 500 twice daily for 7 days. .  Discussed with patient. Rx sent to patient's pharmacy. Advised will need to get a f/up ua and culture after treatment.

## 2021-04-13 RX ORDER — METFORMIN HYDROCHLORIDE 500 MG/1
TABLET, EXTENDED RELEASE ORAL
Qty: 60 TAB | Refills: 0 | Status: ON HOLD
Start: 2021-04-13 | End: 2021-12-16

## 2021-04-13 RX ORDER — INSULIN GLARGINE 100 [IU]/ML
INJECTION, SOLUTION SUBCUTANEOUS
Qty: 30 UNITS | Refills: 0 | Status: ON HOLD
Start: 2021-04-13 | End: 2021-12-16 | Stop reason: SDUPTHER

## 2021-04-13 RX ORDER — INSULIN GLARGINE 100 [IU]/ML
INJECTION, SOLUTION SUBCUTANEOUS
COMMUNITY
End: 2021-04-27

## 2021-04-13 NOTE — PROGRESS NOTES
Extremely poor diabetes control with a glycohemoglobin 11.3 also elevated blood sugar as well as triglycerides and increased amount of protein in the urine although we do not need to problems. At this point I strongly recommend we go to insulin. Start with Lantus 20 units daily. Already on maximum dose of Amaryl and Januvia and if I remember correctly could not tolerate Metformin. Discussed note with patient. Is currently taking Lantus Solostar pen 30 units daily but does not bring it in to update her med list.  States she can tolerate metformin but her concern was that it is a big pill. Took two pills yesterday evening and her sugar this am was 140. Reviewed with Dr. Uche Gomez and will have patient continue this regimen.

## 2021-04-13 NOTE — TELEPHONE ENCOUNTER
Requested Prescriptions     Pending Prescriptions Disp Refills    insulin glargine (LANTUS,BASAGLAR) 100 unit/mL (3 mL) inpn 30 Units 0     Sig: Take 30 units daily    metFORMIN ER (GLUCOPHAGE XR) 500 mg tablet 60 Tab 0     Sig: Take two tablet daily with dinner.

## 2021-04-23 ENCOUNTER — LAB ONLY (OUTPATIENT)
Dept: INTERNAL MEDICINE CLINIC | Age: 76
End: 2021-04-23

## 2021-04-23 DIAGNOSIS — N39.0 FREQUENT UTI: Primary | ICD-10-CM

## 2021-04-23 LAB
APPEARANCE UR: CLEAR
BACTERIA URNS QL MICRO: NEGATIVE /HPF
BILIRUB UR QL: NEGATIVE
COLOR UR: ABNORMAL
EPITH CASTS URNS QL MICRO: ABNORMAL /LPF
GLUCOSE UR STRIP.AUTO-MCNC: >1000 MG/DL
HGB UR QL STRIP: NEGATIVE
HYALINE CASTS URNS QL MICRO: ABNORMAL /LPF (ref 0–5)
KETONES UR QL STRIP.AUTO: NEGATIVE MG/DL
LEUKOCYTE ESTERASE UR QL STRIP.AUTO: NEGATIVE
NITRITE UR QL STRIP.AUTO: NEGATIVE
OTHER,OTHU: ABNORMAL
PH UR STRIP: 6 [PH] (ref 5–8)
PROT UR STRIP-MCNC: NEGATIVE MG/DL
RBC #/AREA URNS HPF: ABNORMAL /HPF (ref 0–5)
SP GR UR REFRACTOMETRY: 1.02 (ref 1–1.03)
UA: UC IF INDICATED,UAUC: ABNORMAL
UROBILINOGEN UR QL STRIP.AUTO: 0.2 EU/DL (ref 0.2–1)
WBC URNS QL MICRO: ABNORMAL /HPF (ref 0–4)
YEAST BUDDING URNS QL: PRESENT

## 2021-04-27 RX ORDER — INSULIN GLARGINE 100 [IU]/ML
INJECTION, SOLUTION SUBCUTANEOUS
Qty: 5 PEN | Refills: 5 | Status: SHIPPED | OUTPATIENT
Start: 2021-04-27 | End: 2021-07-21 | Stop reason: SDUPTHER

## 2021-04-27 NOTE — TELEPHONE ENCOUNTER
RX refill request from the patient/pharmacy. Patient last seen 04- with labs, and next appt. scheduled for 07-  Requested Prescriptions     Pending Prescriptions Disp Refills    Lantus Solostar U-100 Insulin 100 unit/mL (3 mL) inpn [Pharmacy Med Name: LANTUS SOLOSTAR PENS] 5 Pen 5     Sig: TAKE 30 UNITS DAILY   .

## 2021-05-01 PROBLEM — Z00.00 MEDICARE ANNUAL WELLNESS VISIT, SUBSEQUENT: Status: RESOLVED | Noted: 2020-02-12 | Resolved: 2021-05-01

## 2021-05-26 DIAGNOSIS — E78.2 MIXED HYPERLIPIDEMIA: ICD-10-CM

## 2021-05-26 RX ORDER — PRAVASTATIN SODIUM 80 MG/1
TABLET ORAL
Qty: 90 TABLET | Refills: 3 | Status: SHIPPED | OUTPATIENT
Start: 2021-05-26 | End: 2021-11-12 | Stop reason: ALTCHOICE

## 2021-05-26 NOTE — TELEPHONE ENCOUNTER
RX refill request from the patient/pharmacy. Patient last seen 04- with labs, and next appt. scheduled for 07-  Requested Prescriptions     Pending Prescriptions Disp Refills    pravastatin (PRAVACHOL) 80 mg tablet [Pharmacy Med Name: PRAVASTATIN TABS 80MG] 90 Tablet 3     Sig: TAKE 1 TABLET EVERY NIGHT FOR CHOLESTEROL   .

## 2021-05-27 RX ORDER — FENOFIBRATE 145 MG/1
145 TABLET, COATED ORAL DAILY
Qty: 90 TABLET | Refills: 3 | Status: SHIPPED | OUTPATIENT
Start: 2021-05-27 | End: 2021-06-14 | Stop reason: SDUPTHER

## 2021-05-27 NOTE — TELEPHONE ENCOUNTER
PCP: Antonieta Bingham MD    Last appt: 4/2/2021  Future Appointments   Date Time Provider Gwendolyn Loving   7/6/2021  1:00 PM Antonieta Bingham MD [facility] AMB   11/4/2021 11:40 AM Leandro Mansfield DO [facility] AMB       Requested Prescriptions     Pending Prescriptions Disp Refills    fenofibrate nanocrystallized (TRICOR) 145 mg tablet 90 Tablet 3     Sig: Take 1 Tablet by mouth daily.          Other Comments:  Last refill  1/15/2020

## 2021-06-14 RX ORDER — FENOFIBRATE 145 MG/1
145 TABLET, COATED ORAL DAILY
Qty: 90 TABLET | Refills: 3 | Status: SHIPPED | OUTPATIENT
Start: 2021-06-14

## 2021-06-14 NOTE — TELEPHONE ENCOUNTER
RX refill request from the patient/pharmacy. Patient last seen 04- with labs, and next appt. scheduled for 07-  Requested Prescriptions     Pending Prescriptions Disp Refills    fenofibrate nanocrystallized (TRICOR) 145 mg tablet 90 Tablet 3     Sig: Take 1 Tablet by mouth daily. Gracie Carrasco

## 2021-06-17 ENCOUNTER — OFFICE VISIT (OUTPATIENT)
Dept: INTERNAL MEDICINE CLINIC | Age: 76
End: 2021-06-17
Payer: MEDICARE

## 2021-06-17 VITALS
HEIGHT: 63 IN | HEART RATE: 87 BPM | TEMPERATURE: 97.4 F | WEIGHT: 201.9 LBS | DIASTOLIC BLOOD PRESSURE: 78 MMHG | RESPIRATION RATE: 16 BRPM | OXYGEN SATURATION: 97 % | BODY MASS INDEX: 35.77 KG/M2 | SYSTOLIC BLOOD PRESSURE: 110 MMHG

## 2021-06-17 DIAGNOSIS — I10 ESSENTIAL HYPERTENSION: Primary | ICD-10-CM

## 2021-06-17 DIAGNOSIS — E66.01 SEVERE OBESITY (HCC): ICD-10-CM

## 2021-06-17 PROCEDURE — G9708 BILAT MAST/HX BI /UNILAT MAS: HCPCS | Performed by: INTERNAL MEDICINE

## 2021-06-17 PROCEDURE — G8427 DOCREV CUR MEDS BY ELIG CLIN: HCPCS | Performed by: INTERNAL MEDICINE

## 2021-06-17 PROCEDURE — 3017F COLORECTAL CA SCREEN DOC REV: CPT | Performed by: INTERNAL MEDICINE

## 2021-06-17 PROCEDURE — G9717 DOC PT DX DEP/BP F/U NT REQ: HCPCS | Performed by: INTERNAL MEDICINE

## 2021-06-17 PROCEDURE — 1101F PT FALLS ASSESS-DOCD LE1/YR: CPT | Performed by: INTERNAL MEDICINE

## 2021-06-17 PROCEDURE — 99213 OFFICE O/P EST LOW 20 MIN: CPT | Performed by: INTERNAL MEDICINE

## 2021-06-17 PROCEDURE — G8752 SYS BP LESS 140: HCPCS | Performed by: INTERNAL MEDICINE

## 2021-06-17 PROCEDURE — G8754 DIAS BP LESS 90: HCPCS | Performed by: INTERNAL MEDICINE

## 2021-06-17 PROCEDURE — G8536 NO DOC ELDER MAL SCRN: HCPCS | Performed by: INTERNAL MEDICINE

## 2021-06-17 PROCEDURE — G8417 CALC BMI ABV UP PARAM F/U: HCPCS | Performed by: INTERNAL MEDICINE

## 2021-06-17 PROCEDURE — G8399 PT W/DXA RESULTS DOCUMENT: HCPCS | Performed by: INTERNAL MEDICINE

## 2021-06-17 PROCEDURE — 1090F PRES/ABSN URINE INCON ASSESS: CPT | Performed by: INTERNAL MEDICINE

## 2021-06-17 NOTE — PROGRESS NOTES
Cecy De León is a 78 y.o. female presenting for Hypertension  . 1. Have you been to the ER, urgent care clinic since your last visit? Hospitalized since your last visit? No    2. Have you seen or consulted any other health care providers outside of the [address] [place] [address] since your last visit? Include any pap smears or colon screening. No    Fall Risk Assessment, last 12 mths 4/2/2021   Able to walk? Yes   Fall in past 12 months? 0   Do you feel unsteady? 0   Are you worried about falling 0   Number of falls in past 12 months -   Fall with injury? -         Abuse Screening Questionnaire 4/2/2021   Do you ever feel afraid of your partner? N   Are you in a relationship with someone who physically or mentally threatens you? N   Is it safe for you to go home? Y       3 most recent PHQ Screens 4/2/2021   Little interest or pleasure in doing things Not at all   Feeling down, depressed, irritable, or hopeless Not at all   Total Score PHQ 2 0   Trouble falling or staying asleep, or sleeping too much -   Feeling tired or having little energy -   Poor appetite, weight loss, or overeating -   Feeling bad about yourself - or that you are a failure or have let yourself or your family down -   Trouble concentrating on things such as school, work, reading, or watching TV -   Moving or speaking so slowly that other people could have noticed; or the opposite being so fidgety that others notice -   Thoughts of being better off dead, or hurting yourself in some way -   PHQ 9 Score -   How difficult have these problems made it for you to do your work, take care of your home and get along with others -       There are no discontinued medications.

## 2021-06-17 NOTE — PROGRESS NOTES
Isaac Cotton is a 78 y.o. female and presents with Hypertension      Subjective:  Patient comes in today for acute care visit. She reports she was at her dermatologist office yesterday and her blood pressure was elevated. Reports systolics were in 585750U and she was told to follow-up with her PCP. Denies headaches or blurred vision. Did have an episode of headache yesterday but none today. She is currently on Coreg 6.25 p.o. twice daily and HCTZ 25 mg p.o. daily. Reports she was on lisinopril in the past however that was discontinued due to (ACE induced cough). She checked her blood pressure at home today which was at goal (BP less than 140/90). Past Medical History:   Diagnosis Date    Abnormal LFTs 08/24/2017    FATTY LIVER    Arthritis     OSTEO    Avascular necrosis of hip ([address] 08/24/2017    BILAT HIPS    Breast CA ([address] 1989, 2001    BILATERAL; SURGERY, CHEMO    Chronic pain     CKD (chronic kidney disease), stage II 8/24/2017    Coagulation disorder ([address] 1984    ITP  (DR CHU BRADSHAW) - PLATELETS DROPPED TO 5K    Depression     Diabetes ([address]     TYPE 2; NIDDM    DJD (degenerative joint disease), multiple sites 8/24/2017    GI bleed 2008    HEMORRHOIDS    Hyperlipemia     Hypertension with renal disease 8/24/2017    IBS (irritable bowel syndrome) 8/24/2017    Ill-defined condition 2015    PNEUMONIA X2 - HOSPITALIZED IN 2015    Interstitial lung disease ([facility] 04/01/2019    Liver disease     FATTY LIVER    Myalgia 8/24/2017    On statin therapy 8/24/2017    Overactive bladder 8/24/2017    Pneumonia 04/2015    HOSPITALIZED 3 WEEKS.     Polymyalgia rheumatica ([address] 8/24/2017    Prophylactic antibiotic 8/24/2017    Reflex abnormality     acid reflex    Rosacea      Past Surgical History:   Procedure Laterality Date    HX APPENDECTOMY  1950    HX BREAST BIOPSY Bilateral     HX CATARACT REMOVAL Bilateral     HX COLONOSCOPY      HX ENDOSCOPY      HX GI      COLONOSCOPY  HX HEENT      WISDOM TEETH    HX HYSTERECTOMY  2000S    HX MASTECTOMY Right 1989    HX MASTECTOMY Left 2001    HX ORTHOPAEDIC  2008    LUMBAR FUSION    HX ORTHOPAEDIC  2018    RE-DO LUMBAR FUSION, AND ADDED THORACIC FUSION    HX ORTHOPAEDIC  03/26/2019    neckectomy    HX TUBAL LIGATION  1981    HX UROLOGICAL  2000s    BLADDER SLING    [place] BREAST SURGERY PROCEDURE UNLISTED  1993, 2002    RECONSTRUCTION X2    [place] TOTAL HIP ARTHROPLASTY Left 11/16/2016    ANTERIOR APPROACH, DR Gwendolyn De La Torre; (POSTOP: STANDS ONE INCH TALLER ON LEFT FOOT)    [place] TOTAL HIP ARTHROPLASTY Right 12/2016    ANTERIOR APPROACH (DR JAIME)     Allergies   Allergen Reactions    Metformin Diarrhea    Statins-Hmg-Coa Reductase Inhibitors Myalgia     Myalgia with  multiple statins  Takes pravachol     Codeine Rash     Rash on thighs    Darvon [Propoxyphene] Other (comments)     \"I see worms\"    Pcn [Penicillins] Rash    Sulfa (Sulfonamide Antibiotics) Rash     Current Outpatient Medications   Medication Sig Dispense Refill    fenofibrate nanocrystallized (TRICOR) 145 mg tablet Take 1 Tablet by mouth daily. 90 Tablet 3    pravastatin (PRAVACHOL) 80 mg tablet TAKE 1 TABLET EVERY NIGHT FOR CHOLESTEROL 90 Tablet 3    Lantus Solostar U-100 Insulin 100 unit/mL (3 mL) inpn TAKE 30 UNITS DAILY 5 Pen 5    insulin glargine (LANTUS,BASAGLAR) 100 unit/mL (3 mL) inpn Take 30 units daily 30 Units 0    metFORMIN ER (GLUCOPHAGE XR) 500 mg tablet Take two tablet daily with dinner. 60 Tab 0    hydroCHLOROthiazide (HYDRODIURIL) 25 mg tablet TAKE 1 TABLET DAILY FOR FLUID AND BLOOD PRESSURE 90 Tab 3    menthol-zinc oxide (CALMOSEPTINE) 0.44-20.6 % oint Calmoseptine 0.44 %-20.6 % topical ointment in packet   Apply 1 application by topical route.  nystatin (MYCOSTATIN) 100,000 unit/mL suspension TAKE 5 ML BY MOUTH FOUR (4) TIMES DAILY.  SWISH AND SPIT NOTHING TO EAT OR DRINK FOR 30 MINUTES 200 mL 0    glucose blood VI test strips (True Metrix Glucose Test Strip) strip USE ONCE DAILY AND RECORD RESULTS IN A NOTEBOOK ALSO RECORD LAST MEALTIME IN NOTEBOOK 100 Strip 3    benzonatate (TESSALON) 200 mg capsule TAKE 1 CAPSULE BY MOUTH THREE TIMES DAILY AS NEEDED FOR COUGH - SWALLOW CAPSULE WHOLE (DO NOT CRUSH) 60 Cap 1    predniSONE (DELTASONE) 10 mg tablet TAKE 1 TABLET EVERY MONDAY, WEDNESDAY, AND FRIDAY 90 Tab 3    glimepiride (AMARYL) 4 mg tablet Take one tablet twice a day. 180 Tab 3    clemastine 2.68 mg tab tablet Take 2.68 mg by mouth two (2) times a day.  omega 3-dha-epa-fish oil (Fish Oil) 100-160-1,000 mg cap Take  by mouth.  doxycycline (VIBRAMYCIN) 100 mg capsule TAKE 1 (ONE) CAPSULE BY MOUTH TWICE DAILY WITH FOOD      buPROPion XL (Wellbutrin XL) 150 mg tablet Wellbutrin  mg 24 hr tablet, extended release 90 Tab 3    oxazepam (SERAX) 15 mg capsule TAKE 2 CAPSULES BY MOUTH NIGHTLY AT BEDTIME 180 Cap 1    sertraline (ZOLOFT) 100 mg tablet 1 tab QD 90 Tab 3    mupirocin (BACTROBAN) 2 % ointment Apply  to affected area three (3) times daily. 22 g 1    clindamycin (CLEOCIN) 300 mg capsule Take 2 capsules 1 hour before dental procedure 10 Cap 0    carvediloL (COREG) 6.25 mg tablet Take 1 tablet twice daily 180 Tab 3    SITagliptin (JANUVIA) 100 mg tablet Take 1 Tab by mouth daily. 90 Tab 3    pantoprazole (PROTONIX) 40 mg tablet Take 40 mg by mouth daily.  montelukast sodium (SINGULAIR PO) Take  by mouth.  cholecalciferol (VITAMIN D3) 1,000 unit cap Take  by mouth daily. Indications: unknown of the mg.        Social History     Socioeconomic History    Marital status:      Spouse name: Not on file    Number of children: Not on file    Years of education: Not on file    Highest education level: Not on file   Tobacco Use    Smoking status: Never Smoker    Smokeless tobacco: Never Used   Substance and Sexual Activity    Alcohol use: No    Drug use: No    Sexual activity: Never     Social Determinants of Health     Financial Resource Strain:     Difficulty of Paying Living Expenses:    Food Insecurity:     Worried About Running Out of Food in the Last Year:     [address] in the Last Year:    Transportation Needs:     Lack of Transportation (Medical):      Lack of Transportation (Non-Medical):    Physical Activity:     Days of Exercise per Week:     Minutes of Exercise per Session:    Stress:     Feeling of Stress :    Social Connections:     Frequency of Communication with Friends and Family:     Frequency of Social Gatherings with Friends and Family:     Attends [employer] Services:     Active Member of Clubs or Organizations:     Attends Club or Organization Meetings:     Marital Status:      Family History   Problem Relation Age of Onset    Heart Disease Mother     Kidney Disease Mother     Lung Disease Mother         COPD    Diabetes Brother     Pacemaker Brother     Kidney Disease Brother     Hypertension Brother     Anesth Problems Neg Hx        Objective:  Visit Vitals  /78 (BP 1 Location: Left upper arm, BP Patient Position: Sitting, BP Cuff Size: Adult)   Pulse 87   Temp 97.4 °F (36.3 °C)   Resp 16   Ht 5' 3\" (1.6 m)   Wt 201 lb 14.4 oz (91.6 kg)   SpO2 97%   BMI 35.76 kg/m²     Physical Exam:   General appearance - alert, well appearing, and in no distress  Mental status - alert, oriented to person, place, and time  EYE-MARITZA, EOMI, fundi normal, corneas normal, no foreign bodies  ENT-ENT exam normal, no neck nodes or sinus tenderness  Nose - normal and patent, no erythema, discharge or polyps  Mouth - mucous membranes moist, pharynx normal without lesions  Neck - supple, no significant adenopathy   Chest - clear to auscultation, no wheezes, rales or rhonchi, symmetric air entry   Heart - normal rate, regular rhythm, normal S1, S2, no murmurs, rubs, clicks or gallops   Abdomen - soft, nontender, nondistended, no masses or organomegaly  Lymph- no adenopathy palpable  Ext-peripheral pulses normal, no pedal edema, no clubbing or cyanosis  Skin-Warm and dry. no hyperpigmentation, vitiligo, or suspicious lesions  Neuro -alert, oriented, normal speech, no focal findings or movement disorder noted  Musculoskeletal- FROM, no bony abnormalities, no point tenderness    Lab Review:  Results for orders placed or performed in visit on 04/23/21   URINALYSIS W/ REFLEX CULTURE    Specimen: Urine   Result Value Ref Range    Color YELLOW/STRAW      Appearance CLEAR CLEAR      Specific gravity 1.020 1.003 - 1.030      pH (UA) 6.0 5.0 - 8.0      Protein Negative Negative mg/dL    Glucose >1,000 (A) Negative mg/dL    Ketone Negative Negative mg/dL    Bilirubin Negative Negative      Blood Negative Negative      Urobilinogen 0.2 0.2 - 1.0 EU/dL    Nitrites Negative Negative      Leukocyte Esterase Negative Negative      WBC 0-4 0 - 4 /hpf    RBC 0-5 0 - 5 /hpf    Epithelial cells MODERATE (A) FEW /lpf    Bacteria Negative Negative /hpf    UA:UC IF INDICATED CULTURE NOT INDICATED BY UA RESULT CULTURE NOT INDICATED BY UA RESULT      Hyaline cast 0-2 0 - 5 /lpf    Budding yeast PRESENT (A) Negative      Other: Renal Epithelial cells Present          Documenation Review:    Assessment/Plan:    Diagnoses and all orders for this visit:    1. Essential hypertension    2. Severe obesity ([address]      Blood pressure manual recheck by me again which is at goal.  She denies any symptoms. Recommended to check blood pressures at home. No changes in meds were made at this time. ICD-10-CM ICD-9-CM    1. Essential hypertension  I10 401.9    2. Severe obesity ([address]  E66.01 278.01                I have reviewed with the patient details of the assessment and plan and all questions were answered. Relevent patient education was performed. Verbal and/or written instructions (see AVS) provided. The most recent lab findings were reviewed with the patient.   Plan was discussed with patient who verbally expressed understanding. An After Visit Summary was printed and given to the patient.     Aleah Cardenas MD

## 2021-06-17 NOTE — PATIENT INSTRUCTIONS
High Blood Pressure: Care Instructions Overview It's normal for blood pressure to go up and down throughout the day. But if it stays up, you have high blood pressure. Another name for high blood pressure is hypertension. Despite what a lot of people think, high blood pressure usually doesn't cause headaches or make you feel dizzy or lightheaded. It usually has no symptoms. But it does increase your risk of stroke, heart attack, and other problems. You and your doctor will talk about your risks of these problems based on your blood pressure. Your doctor will give you a goal for your blood pressure. Your goal will be based on your health and your age. Lifestyle changes, such as eating healthy and being active, are always important to help lower blood pressure. You might also take medicine to reach your blood pressure goal. 
Follow-up care is a key part of your treatment and safety. Be sure to make and go to all appointments, and call your doctor if you are having problems. It's also a good idea to know your test results and keep a list of the medicines you take. How can you care for yourself at home? Medical treatment · If you stop taking your medicine, your blood pressure will go back up. You may take one or more types of medicine to lower your blood pressure. Be safe with medicines. Take your medicine exactly as prescribed. Call your doctor if you think you are having a problem with your medicine. · Talk to your doctor before you start taking aspirin every day. Aspirin can help certain people lower their risk of a heart attack or stroke. But taking aspirin isn't right for everyone, because it can cause serious bleeding. · See your doctor regularly. You may need to see the doctor more often at first or until your blood pressure comes down. · If you are taking blood pressure medicine, talk to your doctor before you take decongestants or anti-inflammatory medicine, such as ibuprofen.  Some of these medicines can raise blood pressure. · Learn how to check your blood pressure at home. Lifestyle changes · Stay at a healthy weight. This is especially important if you put on weight around the waist. Losing even 10 pounds can help you lower your blood pressure. · If your doctor recommends it, get more exercise. Walking is a good choice. Bit by bit, increase the amount you walk every day. Try for at least 30 minutes on most days of the week. You also may want to swim, bike, or do other activities. · Avoid or limit alcohol. Talk to your doctor about whether you can drink any alcohol. · Try to limit how much sodium you eat to less than 2,300 milligrams (mg) a day. Your doctor may ask you to try to eat less than 1,500 mg a day. · Eat plenty of fruits (such as bananas and oranges), vegetables, legumes, whole grains, and low-fat dairy products. · Lower the amount of saturated fat in your diet. Saturated fat is found in animal products such as milk, cheese, and meat. Limiting these foods may help you lose weight and also lower your risk for heart disease. · Do not smoke. Smoking increases your risk for heart attack and stroke. If you need help quitting, talk to your doctor about stop-smoking programs and medicines. These can increase your chances of quitting for good. When should you call for help? Call  911 anytime you think you may need emergency care. This may mean having symptoms that suggest that your blood pressure is causing a serious heart or blood vessel problem. Your blood pressure may be over 180/120. For example, call 911 if: 
  · You have symptoms of a heart attack. These may include: 
? Chest pain or pressure, or a strange feeling in the chest. 
? Sweating. ? Shortness of breath. ? Nausea or vomiting. ? Pain, pressure, or a strange feeling in the back, neck, jaw, or upper belly or in one or both shoulders or arms. ? Lightheadedness or sudden weakness.  
? A fast or irregular heartbeat.  
  · You have symptoms of a stroke. These may include: 
? Sudden numbness, tingling, weakness, or loss of movement in your face, arm, or leg, especially on only one side of your body. ? Sudden vision changes. ? Sudden trouble speaking. ? Sudden confusion or trouble understanding simple statements. ? Sudden problems with walking or balance. ? A sudden, severe headache that is different from past headaches.  
  · You have severe back or belly pain. Do not wait until your blood pressure comes down on its own. Get help right away. Call your doctor now or seek immediate care if: 
  · Your blood pressure is much higher than normal (such as 180/120 or higher), but you don't have symptoms.  
  · You think high blood pressure is causing symptoms, such as: 
? Severe headache. 
? Blurry vision. Watch closely for changes in your health, and be sure to contact your doctor if: 
  · Your blood pressure measures higher than your doctor recommends at least 2 times. That means the top number is higher or the bottom number is higher, or both.  
  · You think you may be having side effects from your blood pressure medicine. Where can you learn more? Go to http://www.gray.com/ Enter C450 in the search box to learn more about \"High Blood Pressure: Care Instructions. \" Current as of: August 31, 2020               Content Version: 12.8 © 2006-2021 [employer]. Care instructions adapted under license by [employer] (which disclaims liability or warranty for this information). If you have questions about a medical condition or this instruction, always ask your healthcare professional. [place] [id number] any warranty or liability for your use of this information.

## 2021-07-02 NOTE — PROGRESS NOTES
Chief Complaint   Patient presents with    Hypertension     3 month    Diabetes    Cholesterol Problem    Chronic Kidney Disease       SUBJECTIVE:    Sherman Osullivan is a 78 y.o. female who returns in follow-up for her medical problems include hypertension, diabetes, hyperlipidemia, allergic rhinitis, interstitial lung disease followed by pulmonary, IBS, DJD with chronic low back pain and known spinal stenosis and degenerative disc disease, CKD stage II, obesity and other medical problems. She does note that the back is still bothering her quite a bit and she is to the point where she would like to try physical therapy as she has done before. It seemed to help before. She currently denies any history of falls or trauma. She denies any chest pain, shortness of breath, palpitations, PND, orthopnea or other cardiac or respiratory complaints. She notes no GI or  complaints. She has no headaches, dizziness or neurologic complaints. She has no current active arthritic complaints other than the chronic back pain. Current Outpatient Medications   Medication Sig Dispense Refill    fenofibrate nanocrystallized (TRICOR) 145 mg tablet Take 1 Tablet by mouth daily. 90 Tablet 3    pravastatin (PRAVACHOL) 80 mg tablet TAKE 1 TABLET EVERY NIGHT FOR CHOLESTEROL 90 Tablet 3    Lantus Solostar U-100 Insulin 100 unit/mL (3 mL) inpn TAKE 30 UNITS DAILY 5 Pen 5    insulin glargine (LANTUS,BASAGLAR) 100 unit/mL (3 mL) inpn Take 30 units daily 30 Units 0    metFORMIN ER (GLUCOPHAGE XR) 500 mg tablet Take two tablet daily with dinner. 60 Tab 0    hydroCHLOROthiazide (HYDRODIURIL) 25 mg tablet TAKE 1 TABLET DAILY FOR FLUID AND BLOOD PRESSURE 90 Tab 3    menthol-zinc oxide (CALMOSEPTINE) 0.44-20.6 % oint Calmoseptine 0.44 %-20.6 % topical ointment in packet   Apply 1 application by topical route.  nystatin (MYCOSTATIN) 100,000 unit/mL suspension TAKE 5 ML BY MOUTH FOUR (4) TIMES DAILY.  SWISH AND SPIT NOTHING TO EAT OR DRINK FOR 30 MINUTES 200 mL 0    glucose blood VI test strips (True Metrix Glucose Test Strip) strip USE ONCE DAILY AND RECORD RESULTS IN A NOTEBOOK ALSO RECORD LAST MEALTIME IN NOTEBOOK 100 Strip 3    benzonatate (TESSALON) 200 mg capsule TAKE 1 CAPSULE BY MOUTH THREE TIMES DAILY AS NEEDED FOR COUGH - SWALLOW CAPSULE WHOLE (DO NOT CRUSH) 60 Cap 1    predniSONE (DELTASONE) 10 mg tablet TAKE 1 TABLET EVERY MONDAY, WEDNESDAY, AND FRIDAY 90 Tab 3    glimepiride (AMARYL) 4 mg tablet Take one tablet twice a day. 180 Tab 3    clemastine 2.68 mg tab tablet Take 2.68 mg by mouth two (2) times a day.  omega 3-dha-epa-fish oil (Fish Oil) 100-160-1,000 mg cap Take  by mouth.  doxycycline (VIBRAMYCIN) 100 mg capsule TAKE 1 (ONE) CAPSULE BY MOUTH TWICE DAILY WITH FOOD      buPROPion XL (Wellbutrin XL) 150 mg tablet Wellbutrin  mg 24 hr tablet, extended release 90 Tab 3    oxazepam (SERAX) 15 mg capsule TAKE 2 CAPSULES BY MOUTH NIGHTLY AT BEDTIME 180 Cap 1    sertraline (ZOLOFT) 100 mg tablet 1 tab QD 90 Tab 3    mupirocin (BACTROBAN) 2 % ointment Apply  to affected area three (3) times daily. 22 g 1    clindamycin (CLEOCIN) 300 mg capsule Take 2 capsules 1 hour before dental procedure 10 Cap 0    carvediloL (COREG) 6.25 mg tablet Take 1 tablet twice daily 180 Tab 3    SITagliptin (JANUVIA) 100 mg tablet Take 1 Tab by mouth daily. 90 Tab 3    pantoprazole (PROTONIX) 40 mg tablet Take 40 mg by mouth daily.  montelukast sodium (SINGULAIR PO) Take  by mouth.  cholecalciferol (VITAMIN D3) 1,000 unit cap Take  by mouth daily. Indications: unknown of the mg.        Past Medical History:   Diagnosis Date    Abnormal LFTs 08/24/2017    FATTY LIVER    Arthritis     OSTEO    Avascular necrosis of hip ([facility] 08/24/2017    BILAT HIPS    Breast CA ([facility] [address] 1989, 2001    BILATERAL; SURGERY, CHEMO    Chronic pain     CKD (chronic kidney disease), stage II 8/24/2017    Coagulation disorder ([facility] [address] 1984    ITP  (DR CHU BRADSHAW) - PLATELETS DROPPED TO 5K    Depression     Diabetes ([facility] [address]     TYPE 2; NIDDM    DJD (degenerative joint disease), multiple sites 8/24/2017    GI bleed 2008    HEMORRHOIDS    Hyperlipemia     Hypertension with renal disease 8/24/2017    IBS (irritable bowel syndrome) 8/24/2017    Ill-defined condition 2015    PNEUMONIA X2 - HOSPITALIZED IN 2015    Interstitial lung disease ([facility] [address] 04/01/2019    Liver disease     FATTY LIVER    Myalgia 8/24/2017    On statin therapy 8/24/2017    Overactive bladder 8/24/2017    Pneumonia 04/2015    HOSPITALIZED 3 WEEKS.     Polymyalgia rheumatica ([facility] [address] 8/24/2017    Prophylactic antibiotic 8/24/2017    Reflex abnormality     acid reflex    Rosacea      Past Surgical History:   Procedure Laterality Date    HX APPENDECTOMY  1950    HX BREAST BIOPSY Bilateral     HX CATARACT REMOVAL Bilateral     HX COLONOSCOPY      HX ENDOSCOPY      HX GI      COLONOSCOPY    HX HEENT      WISDOM TEETH    HX HYSTERECTOMY  2000S    HX MASTECTOMY Right 1989    HX MASTECTOMY Left 2001    HX ORTHOPAEDIC  2008    LUMBAR FUSION    HX ORTHOPAEDIC  2018    RE-DO LUMBAR FUSION, AND ADDED THORACIC FUSION    HX ORTHOPAEDIC  03/26/2019    neckectomy    HX TUBAL LIGATION  1981    HX UROLOGICAL  2000s    BLADDER SLING    IN BREAST SURGERY PROCEDURE UNLISTED  1993, 2002    RECONSTRUCTION X2    IN TOTAL HIP ARTHROPLASTY Left 11/16/2016    ANTERIOR APPROACH, DR Gwendolyn De La Torre; (POSTOP: STANDS ONE INCH TALLER ON LEFT FOOT)    IN TOTAL HIP ARTHROPLASTY Right 12/2016    ANTERIOR APPROACH (DR JAIME)     Allergies   Allergen Reactions    Metformin Diarrhea    Statins-Hmg-Coa Reductase Inhibitors Myalgia     Myalgia with  multiple statins  Takes pravachol     Codeine Rash     Rash on thighs    Darvon [Propoxyphene] Other (comments)     \"I see worms\"    Pcn [Penicillins] Rash    Sulfa (Sulfonamide Antibiotics) Rash       REVIEW OF SYSTEMS:  General: negative for - chills or fever, or weight loss or gain  ENT: negative for - headaches, nasal congestion or tinnitus  Eyes: no blurred or visual changes  Neck: No stiffness or swollen nodes  Respiratory: negative for - cough, hemoptysis, shortness of breath or wheezing  Cardiovascular : negative for - chest pain, edema, palpitations or shortness of breath  Gastrointestinal: negative for - abdominal pain, blood in stools, heartburn or nausea/vomiting  Genito-Urinary: no dysuria, trouble voiding, or hematuria  Musculoskeletal: negative for - gait disturbance, joint pain, joint stiffness or joint swelling. Low back pain  Neurological: no TIA or stroke symptoms  Hematologic: no bruises, no bleeding  Lymphatic: no swollen glands  Integument: no lumps, mole changes, nail changes or rash  Endocrine:no malaise/lethargy poly uria or polydipsia or unexpected weight changes        Social History     Socioeconomic History    Marital status:      Spouse name: Not on file    Number of children: Not on file    Years of education: Not on file    Highest education level: Not on file   Tobacco Use    Smoking status: Never Smoker    Smokeless tobacco: Never Used   Substance and Sexual Activity    Alcohol use: No    Drug use: No    Sexual activity: Never     Social Determinants of Health     Financial Resource Strain:     Difficulty of Paying Living Expenses:    Food Insecurity:     Worried About Running Out of Food in the Last Year:     Ran Out of Food in the Last Year:    Transportation Needs:     Lack of Transportation (Medical):      Lack of Transportation (Non-Medical):    Physical Activity:     Days of Exercise per Week:     Minutes of Exercise per Session:    Stress:     Feeling of Stress :    Social Connections:     Frequency of Communication with Friends and Family:     Frequency of Social Gatherings with Friends and Family:     Attends [employer] Services:     Active Member of Clubs or Organizations:     Attends Club or Organization Meetings:     Marital Status:      Family History   Problem Relation Age of Onset    Heart Disease Mother     Kidney Disease Mother     Lung Disease Mother         COPD    Diabetes Brother     Pacemaker Brother     Kidney Disease Brother     Hypertension Brother     Anesth Problems Neg Hx        OBJECTIVE:     Visit Vitals  /76 (BP 1 Location: Left upper arm, BP Patient Position: Sitting, BP Cuff Size: Large adult)   Pulse (!) 116   Temp 97.7 °F (36.5 °C) (Oral)   Resp 18   Ht 5' 3\" (1.6 m)   Wt 195 lb 6.4 oz (88.6 kg)   SpO2 96%   BMI 34.61 kg/m²     CONSTITUTIONAL:   well nourished, appears age appropriate  EYES: sclera anicteric, PERRL, EOMI  ENMT:nares clear, moist mucous membranes, pharynx clear  NECK: supple. Thyroid normal, No JVD or bruits  RESPIRATORY: Chest: clear to ascultation and percussion, normal inspiratory effort  CARDIOVASCULAR: Heart: regular rate and rhythm no murmurs, rubs or gallops, PMI not displaced, No thrills, no peripheral edema  GASTROINTESTINAL: Abdomen: non distended, soft, non tender, bowel sounds normal  HEMATOLOGIC: no purpura, petechiae or bruising  LYMPHATIC: No lymph node enlargemant  MUSCULOSKELETAL: Extremities: no active synovitis, pulse 1+   INTEGUMENT: No unusual rashes or suspicious skin lesions noted. Nails appear normal.  PERIPHERAL VASCULAR: normal pulses femoral, PT and DP  NEUROLOGIC: non-focal exam, A & O X 3  PSYCHIATRIC:, appropriate affect     ASSESSMENT:   1. Hypertension with renal disease    2. Controlled type 2 diabetes mellitus with stage 2 chronic kidney disease, with long-term current use of insulin ([address]    3. Mixed hyperlipidemia    4. Interstitial lung disease ([address]    5. Primary osteoarthritis involving multiple joints    6. CKD (chronic kidney disease), stage II    7. Class 1 obesity due to excess calories without serious comorbidity with body mass index (BMI) of 33.0 to 33.9 in adult    8.  Spinal stenosis of lumbar region with neurogenic claudication    9. Lumbar disc disease    10. Chronic midline low back pain without sciatica      Impression  1. Hypertension that is controlled so continue current therapy reviewed with her. 2.  Diabetes repeat status pending and prior lab reviewed and I will make adjustments if necessary. 3.  Hyperlipidemia prior lab reviewed and repeat status pending and I will adjust if needed. 4. Interstitial lung disease seems stable with O2 sat of 96% on room air. 5.  DJD stable except for the low back  6. CKD stage II repeat status pending  7. Obesity I did discuss diet, exercise and weight reduction for overall health benefit. 8.  Spinal stenosis with lumbar disc disease and chronic low back pain I will set her up for physical therapy and see if this is beneficial.  I will recheck her again myself in 3 months or sooner should the be a problem. I will call her lab results in interim. PLAN:  .  Orders Placed This Encounter    METABOLIC PANEL, COMPREHENSIVE    LIPID PANEL    CK    HEMOGLOBIN A1C WITH EAG         ATTENTION:   This medical record was transcribed using an electronic medical records system. Although proofread, it may and can contain electronic and spelling errors. Other human spelling and other errors may be present. Corrections may be executed at a later time. Please feel free to contact us for any clarifications as needed. Follow-up and Dispositions    · Return in about 3 months (around 10/6/2021). No results found for any visits on 07/06/21. Nick Salas MD    The patient verbalized understanding of the problems and plans as explained.

## 2021-07-06 ENCOUNTER — OFFICE VISIT (OUTPATIENT)
Dept: INTERNAL MEDICINE CLINIC | Age: 76
End: 2021-07-06
Payer: MEDICARE

## 2021-07-06 VITALS
WEIGHT: 195.4 LBS | HEART RATE: 116 BPM | SYSTOLIC BLOOD PRESSURE: 108 MMHG | TEMPERATURE: 97.7 F | OXYGEN SATURATION: 96 % | DIASTOLIC BLOOD PRESSURE: 76 MMHG | BODY MASS INDEX: 34.62 KG/M2 | RESPIRATION RATE: 18 BRPM | HEIGHT: 63 IN

## 2021-07-06 DIAGNOSIS — M48.062 SPINAL STENOSIS OF LUMBAR REGION WITH NEUROGENIC CLAUDICATION: ICD-10-CM

## 2021-07-06 DIAGNOSIS — J84.9 INTERSTITIAL LUNG DISEASE (HCC): ICD-10-CM

## 2021-07-06 DIAGNOSIS — M54.50 CHRONIC MIDLINE LOW BACK PAIN WITHOUT SCIATICA: ICD-10-CM

## 2021-07-06 DIAGNOSIS — N18.2 CONTROLLED TYPE 2 DIABETES MELLITUS WITH STAGE 2 CHRONIC KIDNEY DISEASE, WITH LONG-TERM CURRENT USE OF INSULIN (HCC): ICD-10-CM

## 2021-07-06 DIAGNOSIS — M15.9 PRIMARY OSTEOARTHRITIS INVOLVING MULTIPLE JOINTS: ICD-10-CM

## 2021-07-06 DIAGNOSIS — I12.9 HYPERTENSION WITH RENAL DISEASE: Primary | ICD-10-CM

## 2021-07-06 DIAGNOSIS — E78.2 MIXED HYPERLIPIDEMIA: ICD-10-CM

## 2021-07-06 DIAGNOSIS — G89.29 CHRONIC MIDLINE LOW BACK PAIN WITHOUT SCIATICA: ICD-10-CM

## 2021-07-06 DIAGNOSIS — E66.09 CLASS 1 OBESITY DUE TO EXCESS CALORIES WITHOUT SERIOUS COMORBIDITY WITH BODY MASS INDEX (BMI) OF 33.0 TO 33.9 IN ADULT: ICD-10-CM

## 2021-07-06 DIAGNOSIS — M51.9 LUMBAR DISC DISEASE: ICD-10-CM

## 2021-07-06 DIAGNOSIS — Z79.4 CONTROLLED TYPE 2 DIABETES MELLITUS WITH STAGE 2 CHRONIC KIDNEY DISEASE, WITH LONG-TERM CURRENT USE OF INSULIN (HCC): ICD-10-CM

## 2021-07-06 DIAGNOSIS — E11.22 CONTROLLED TYPE 2 DIABETES MELLITUS WITH STAGE 2 CHRONIC KIDNEY DISEASE, WITH LONG-TERM CURRENT USE OF INSULIN (HCC): ICD-10-CM

## 2021-07-06 DIAGNOSIS — N18.2 CKD (CHRONIC KIDNEY DISEASE), STAGE II: ICD-10-CM

## 2021-07-06 PROCEDURE — G8754 DIAS BP LESS 90: HCPCS | Performed by: INTERNAL MEDICINE

## 2021-07-06 PROCEDURE — 2022F DILAT RTA XM EVC RTNOPTHY: CPT | Performed by: INTERNAL MEDICINE

## 2021-07-06 PROCEDURE — G8417 CALC BMI ABV UP PARAM F/U: HCPCS | Performed by: INTERNAL MEDICINE

## 2021-07-06 PROCEDURE — G8752 SYS BP LESS 140: HCPCS | Performed by: INTERNAL MEDICINE

## 2021-07-06 PROCEDURE — 3017F COLORECTAL CA SCREEN DOC REV: CPT | Performed by: INTERNAL MEDICINE

## 2021-07-06 PROCEDURE — G9708 BILAT MAST/HX BI /UNILAT MAS: HCPCS | Performed by: INTERNAL MEDICINE

## 2021-07-06 PROCEDURE — G8536 NO DOC ELDER MAL SCRN: HCPCS | Performed by: INTERNAL MEDICINE

## 2021-07-06 PROCEDURE — G8427 DOCREV CUR MEDS BY ELIG CLIN: HCPCS | Performed by: INTERNAL MEDICINE

## 2021-07-06 PROCEDURE — 1101F PT FALLS ASSESS-DOCD LE1/YR: CPT | Performed by: INTERNAL MEDICINE

## 2021-07-06 PROCEDURE — 3046F HEMOGLOBIN A1C LEVEL >9.0%: CPT | Performed by: INTERNAL MEDICINE

## 2021-07-06 PROCEDURE — G8399 PT W/DXA RESULTS DOCUMENT: HCPCS | Performed by: INTERNAL MEDICINE

## 2021-07-06 PROCEDURE — 1090F PRES/ABSN URINE INCON ASSESS: CPT | Performed by: INTERNAL MEDICINE

## 2021-07-06 PROCEDURE — 99214 OFFICE O/P EST MOD 30 MIN: CPT | Performed by: INTERNAL MEDICINE

## 2021-07-06 PROCEDURE — G9717 DOC PT DX DEP/BP F/U NT REQ: HCPCS | Performed by: INTERNAL MEDICINE

## 2021-07-06 NOTE — PROGRESS NOTES
HIPAA verified by two patient identifiers. Rogers Arroyo is a 78 y.o. female    Chief Complaint   Patient presents with    Hypertension     3 month    Diabetes    Cholesterol Problem    Chronic Kidney Disease       Visit Vitals  /76 (BP 1 Location: Left upper arm, BP Patient Position: Sitting, BP Cuff Size: Large adult)   Pulse (!) 116   Temp 97.7 °F (36.5 °C) (Oral)   Resp 18   Ht 5' 3\" (1.6 m)   Wt 195 lb 6.4 oz (88.6 kg)   SpO2 96%   BMI 34.61 kg/m²       Pain Scale: 3/10  Pain Location: Back      Health Maintenance Due   Topic Date Due    COVID-19 Vaccine (1) Never done    DTaP/Tdap/Td series (1 - Tdap) Never done    Shingrix Vaccine Age 50> (1 of 2) Never done    A1C test (Diabetic or Prediabetic)  07/02/2021         Coordination of Care Questionnaire:  :   1) Have you been to an emergency room, urgent care, or hospitalized since your last visit? If yes, where when, and reason for visit? no       2. Have seen or consulted any other health care provider since your last visit? If yes, where when, and reason for visit? NO      Patient is accompanied by self I have received verbal consent from Rogers Arroyo to discuss any/all medical information while they are present in the room.

## 2021-07-06 NOTE — PATIENT INSTRUCTIONS
Back Pain: Care Instructions  Your Care Instructions     Back pain has many possible causes. It is often related to problems with muscles and ligaments of the back. It may also be related to problems with the nerves, discs, or bones of the back. Moving, lifting, standing, sitting, or sleeping in an awkward way can strain the back. Sometimes you don't notice the injury until later. Arthritis is another common cause of back pain. Although it may hurt a lot, back pain usually improves on its own within several weeks. Most people recover in 12 weeks or less. Using good home treatment and being careful not to stress your back can help you feel better sooner. Follow-up care is a key part of your treatment and safety. Be sure to make and go to all appointments, and call your doctor if you are having problems. It's also a good idea to know your test results and keep a list of the medicines you take. How can you care for yourself at home? · Sit or lie in positions that are most comfortable and reduce your pain. Try one of these positions when you lie down:  ? Lie on your back with your knees bent and supported by large pillows. ? Lie on the floor with your legs on the seat of a sofa or chair. ? Lie on your side with your knees and hips bent and a pillow between your legs. ? Lie on your stomach if it does not make pain worse. · Do not sit up in bed, and avoid soft couches and twisted positions. Bed rest can help relieve pain at first, but it delays healing. Avoid bed rest after the first day of back pain. · Change positions every 30 minutes. If you must sit for long periods of time, take breaks from sitting. Get up and walk around, or lie in a comfortable position. · Try using a heating pad on a low or medium setting for 15 to 20 minutes every 2 or 3 hours. Try a warm shower in place of one session with the heating pad. · You can also try an ice pack for 10 to 15 minutes every 2 to 3 hours.  Put a thin cloth between the ice pack and your skin. · Take pain medicines exactly as directed. ? If the doctor gave you a prescription medicine for pain, take it as prescribed. ? If you are not taking a prescription pain medicine, ask your doctor if you can take an over-the-counter medicine. · Take short walks several times a day. You can start with 5 to 10 minutes, 3 or 4 times a day, and work up to longer walks. Walk on level surfaces and avoid hills and stairs until your back is better. · Return to work and other activities as soon as you can. Continued rest without activity is usually not good for your back. · To prevent future back pain, do exercises to stretch and strengthen your back and stomach. Learn how to use good posture, safe lifting techniques, and proper body mechanics. When should you call for help? Call your doctor now or seek immediate medical care if:    · You have new or worsening numbness in your legs.     · You have new or worsening weakness in your legs. (This could make it hard to stand up.)     · You lose control of your bladder or bowels. Watch closely for changes in your health, and be sure to contact your doctor if:    · You have a fever, lose weight, or don't feel well.     · You do not get better as expected. Where can you learn more? Go to http://www.devine.com/  Enter I594 in the search box to learn more about \"Back Pain: Care Instructions. \"  Current as of: November 16, 2020               Content Version: 12.8  © 6469-3172 [employer]. Care instructions adapted under license by [employer] (which disclaims liability or warranty for this information). If you have questions about a medical condition or this instruction, always ask your healthcare professional. [place] [address] any warranty or liability for your use of this information.

## 2021-07-07 LAB
ALBUMIN SERPL-MCNC: 4.4 G/DL (ref 3.5–5)
ALBUMIN/GLOB SERPL: 1.2 {RATIO} (ref 1.1–2.2)
ALP SERPL-CCNC: 122 U/L (ref 45–117)
ALT SERPL-CCNC: 81 U/L (ref 12–78)
ANION GAP SERPL CALC-SCNC: 5 MMOL/L (ref 5–15)
AST SERPL-CCNC: 46 U/L (ref 15–37)
BILIRUB SERPL-MCNC: 0.5 MG/DL (ref 0.2–1)
BUN SERPL-MCNC: 22 MG/DL (ref 6–20)
BUN/CREAT SERPL: 21 (ref 12–20)
CALCIUM SERPL-MCNC: 10.8 MG/DL (ref 8.5–10.1)
CHLORIDE SERPL-SCNC: 98 MMOL/L (ref 97–108)
CHOLEST SERPL-MCNC: 238 MG/DL
CK SERPL-CCNC: 39 U/L (ref 26–192)
CO2 SERPL-SCNC: 32 MMOL/L (ref 21–32)
CREAT SERPL-MCNC: 1.06 MG/DL (ref 0.55–1.02)
GLOBULIN SER CALC-MCNC: 3.7 G/DL (ref 2–4)
GLUCOSE SERPL-MCNC: 333 MG/DL (ref 65–100)
HDLC SERPL-MCNC: 50 MG/DL
HDLC SERPL: 4.8 {RATIO} (ref 0–5)
LDLC SERPL CALC-MCNC: 148.2 MG/DL (ref 0–100)
POTASSIUM SERPL-SCNC: 4.2 MMOL/L (ref 3.5–5.1)
PROT SERPL-MCNC: 8.1 G/DL (ref 6.4–8.2)
SODIUM SERPL-SCNC: 135 MMOL/L (ref 136–145)
TRIGL SERPL-MCNC: 199 MG/DL (ref ?–150)
VLDLC SERPL CALC-MCNC: 39.8 MG/DL

## 2021-07-08 NOTE — PROGRESS NOTES
Increased cholesterol, triglycerides and LDL. Question taking Pravachol 80 mg every day. Markedly elevated blood sugar. Question taking diabetic medicines on a daily basis. Glycohemoglobin was ordered but not done so we certainly need that done.

## 2021-07-09 NOTE — PROGRESS NOTES
Increased cholesterol, triglycerides and LDL. Question taking Pravachol 80 mg every day. Markedly elevated blood sugar. Question taking diabetic medicines on a daily basis. Glycohemoglobin was ordered but not done so we certainly need that done. Patient states she is taking her Pravastatin 80 mg daily and is working on diet and has lost a few lbs. Dr. Sonny Castelan will wait for patient's hemoglobin A1c before he makes any changes in her diabetic medications.

## 2021-07-13 RX ORDER — PEN NEEDLE, DIABETIC 31 GX5/16"
NEEDLE, DISPOSABLE MISCELLANEOUS
Qty: 1 PACKAGE | Refills: 3 | Status: SHIPPED | OUTPATIENT
Start: 2021-07-13 | End: 2022-06-20 | Stop reason: SDUPTHER

## 2021-07-13 NOTE — TELEPHONE ENCOUNTER
RX refill request from the patient/pharmacy. Patient last seen 07- with labs, and next appt. scheduled for 07-  Requested Prescriptions     Pending Prescriptions Disp Refills    BD Ultra-Fine Short Pen Needle 31 gauge x 5/16\" ndle [Pharmacy Med Name: BD PEN LEELEE UF SHORT 8MM 90S 31G5/16] 1 Package 3     Sig: USE UNDER THE SKIN DAILY. USE WITH LANTUS FLEX PEN DAILY   .

## 2021-07-15 ENCOUNTER — LAB ONLY (OUTPATIENT)
Dept: INTERNAL MEDICINE CLINIC | Age: 76
End: 2021-07-15

## 2021-07-15 DIAGNOSIS — E11.22 CONTROLLED TYPE 2 DIABETES MELLITUS WITH STAGE 2 CHRONIC KIDNEY DISEASE, WITH LONG-TERM CURRENT USE OF INSULIN (HCC): Primary | ICD-10-CM

## 2021-07-15 DIAGNOSIS — N18.2 CONTROLLED TYPE 2 DIABETES MELLITUS WITH STAGE 2 CHRONIC KIDNEY DISEASE, WITH LONG-TERM CURRENT USE OF INSULIN (HCC): Primary | ICD-10-CM

## 2021-07-15 DIAGNOSIS — Z79.4 CONTROLLED TYPE 2 DIABETES MELLITUS WITH STAGE 2 CHRONIC KIDNEY DISEASE, WITH LONG-TERM CURRENT USE OF INSULIN (HCC): Primary | ICD-10-CM

## 2021-07-15 LAB
EST. AVERAGE GLUCOSE BLD GHB EST-MCNC: 235 MG/DL
HBA1C MFR BLD: 9.8 % (ref 4–5.6)

## 2021-07-16 ENCOUNTER — TELEPHONE (OUTPATIENT)
Dept: INTERNAL MEDICINE CLINIC | Age: 76
End: 2021-07-16

## 2021-07-16 NOTE — TELEPHONE ENCOUNTER
Pt called and stated her chronic shortness of breath has gotten worse and would like to increase her prednisone from 10 mg daily to 15 mg daily. Per Dr. Nettie Schmidt prednisone 15 mg daily would not be beneficial but she may increase prednisone to 20 mg daily. Patient was notified   Patient verbalized understanding of information discussed  with no further questions at this time.

## 2021-07-16 NOTE — PROGRESS NOTES
Glycohemoglobin remains markedly elevated although slightly better is still 9.8. She has both Basaglar and Lantus listed. I do not know which she is taking but which ever she is taking I would go from 30 units once daily to 22 units twice daily and we need to clean up her chart so it indicates what she is taking.

## 2021-07-19 RX ORDER — CARVEDILOL 6.25 MG/1
TABLET ORAL
Qty: 180 TABLET | Refills: 3 | Status: SHIPPED | OUTPATIENT
Start: 2021-07-19 | End: 2022-07-11

## 2021-07-19 NOTE — TELEPHONE ENCOUNTER
RX refill request from the patient/pharmacy. Patient last seen 07- with labs, and next appt. scheduled for 10-  Requested Prescriptions     Pending Prescriptions Disp Refills    carvediloL (COREG) 6.25 mg tablet [Pharmacy Med Name: CARVEDILOL (IR) TABS 6.25MG] 180 Tablet 3     Sig: TAKE 1 TABLET TWICE A DAY   .

## 2021-07-21 RX ORDER — INSULIN GLARGINE 100 [IU]/ML
INJECTION, SOLUTION SUBCUTANEOUS
Qty: 5 PEN | Refills: 5
Start: 2021-07-21 | End: 2021-10-26

## 2021-07-21 NOTE — TELEPHONE ENCOUNTER
RX refill request from the patient/pharmacy. Patient last seen 07- with labs, and next appt. scheduled for 10-  Requested Prescriptions     Pending Prescriptions Disp Refills    insulin glargine (Lantus Solostar U-100 Insulin) 100 unit/mL (3 mL) inpn 5 Pen 5     Sig: Take 22 units BID   .

## 2021-07-21 NOTE — PROGRESS NOTES
Glycohemoglobin remains markedly elevated although slightly better is still 9.8. She has both Basaglar and Lantus listed. I do not know which she is taking but which ever she is taking I would go from 30 units once daily to 22 units twice daily and we need to clean up her chart so it indicates what she is taking. Discussed with patient. Is currently taking Lantus Solostar units. Dr. Lizzy Harper desires her to change this to 22 units BID. Discussed with patient to taker her medications regularly.   Also asked her to bring her medications to the next visit to review the medication list.

## 2021-08-01 DIAGNOSIS — F32.A DEPRESSION, UNSPECIFIED DEPRESSION TYPE: ICD-10-CM

## 2021-08-02 RX ORDER — SERTRALINE HYDROCHLORIDE 100 MG/1
TABLET, FILM COATED ORAL
Qty: 90 TABLET | Refills: 3 | Status: SHIPPED | OUTPATIENT
Start: 2021-08-02 | End: 2022-06-20 | Stop reason: SDUPTHER

## 2021-08-02 RX ORDER — SITAGLIPTIN 100 MG/1
TABLET, FILM COATED ORAL
Qty: 90 TABLET | Refills: 3 | Status: SHIPPED | OUTPATIENT
Start: 2021-08-02 | End: 2022-01-18 | Stop reason: SDUPTHER

## 2021-08-02 NOTE — TELEPHONE ENCOUNTER
RX refill request from the patient/pharmacy. Patient last seen 07- with labs, and next appt. scheduled for 10-  Requested Prescriptions     Pending Prescriptions Disp Refills    Januvia 100 mg tablet [Pharmacy Med Name: Pepe Palacio TABS 100MG] 90 Tablet 3     Sig: TAKE 1 TABLET DAILY    sertraline (ZOLOFT) 100 mg tablet [Pharmacy Med Name: SERTRALINE HCL TABS 100MG] 90 Tablet 3     Sig: TAKE 1 TABLET DAILY   .

## 2021-08-06 RX ORDER — PREDNISONE 20 MG/1
TABLET ORAL
Qty: 90 TABLET | Refills: 3 | Status: ON HOLD | OUTPATIENT
Start: 2021-08-06 | End: 2021-12-16 | Stop reason: SDUPTHER

## 2021-08-06 NOTE — TELEPHONE ENCOUNTER
RX refill request from the patient/pharmacy. Patient last seen 07- with labs, and next appt. scheduled for 10-6-2021.   Requested Prescriptions     Pending Prescriptions Disp Refills    predniSONE (DELTASONE) 20 mg tablet 90 Tablet 3     Sig: Take one tablet daily

## 2021-08-30 RX ORDER — BENZONATATE 200 MG/1
CAPSULE ORAL
Qty: 60 CAPSULE | Refills: 0 | Status: SHIPPED | OUTPATIENT
Start: 2021-08-30 | End: 2022-01-10 | Stop reason: SDUPTHER

## 2021-08-30 NOTE — TELEPHONE ENCOUNTER
RX refill request from the patient/pharmacy. Patient last seen 07- with labs, and next appt. scheduled for 10-  Requested Prescriptions     Pending Prescriptions Disp Refills    benzonatate (TESSALON) 200 mg capsule [Pharmacy Med Name: BENZONATATE 200MG] 60 Capsule 0     Sig: TAKE 1 CAPSULE BY MOUTH THREE TIMES DAILY AS NEEDED FOR COUGH - SWALLOW CAPSULE WHOLE (DO NOT CRUSH)   .

## 2021-09-14 RX ORDER — BUPROPION HYDROCHLORIDE 150 MG/1
TABLET ORAL
Qty: 90 TABLET | Refills: 3 | Status: SHIPPED | OUTPATIENT
Start: 2021-09-14 | End: 2022-09-12

## 2021-09-14 NOTE — TELEPHONE ENCOUNTER
RX refill request from the patient/pharmacy. Patient last seen 07- with labs, and next appt. scheduled for 10-  Requested Prescriptions     Pending Prescriptions Disp Refills    buPROPion XL (WELLBUTRIN XL) 150 mg tablet [Pharmacy Med Name: BUPROPION HCL XL TABS 150MG] 90 Tablet 3     Sig: TAKE 1 TABLET DAILY   .

## 2021-10-06 ENCOUNTER — OFFICE VISIT (OUTPATIENT)
Dept: INTERNAL MEDICINE CLINIC | Age: 76
End: 2021-10-06
Payer: MEDICARE

## 2021-10-06 VITALS
WEIGHT: 203.5 LBS | SYSTOLIC BLOOD PRESSURE: 156 MMHG | RESPIRATION RATE: 16 BRPM | HEIGHT: 63 IN | TEMPERATURE: 98.6 F | BODY MASS INDEX: 36.06 KG/M2 | HEART RATE: 95 BPM | OXYGEN SATURATION: 97 % | DIASTOLIC BLOOD PRESSURE: 100 MMHG

## 2021-10-06 DIAGNOSIS — M15.9 PRIMARY OSTEOARTHRITIS INVOLVING MULTIPLE JOINTS: ICD-10-CM

## 2021-10-06 DIAGNOSIS — I12.9 HYPERTENSION WITH RENAL DISEASE: Primary | ICD-10-CM

## 2021-10-06 DIAGNOSIS — Z79.4 CONTROLLED TYPE 2 DIABETES MELLITUS WITH STAGE 2 CHRONIC KIDNEY DISEASE, WITH LONG-TERM CURRENT USE OF INSULIN (HCC): ICD-10-CM

## 2021-10-06 DIAGNOSIS — E66.09 CLASS 1 OBESITY DUE TO EXCESS CALORIES WITHOUT SERIOUS COMORBIDITY WITH BODY MASS INDEX (BMI) OF 33.0 TO 33.9 IN ADULT: ICD-10-CM

## 2021-10-06 DIAGNOSIS — E78.2 MIXED HYPERLIPIDEMIA: ICD-10-CM

## 2021-10-06 DIAGNOSIS — J84.9 INTERSTITIAL LUNG DISEASE (HCC): ICD-10-CM

## 2021-10-06 DIAGNOSIS — N18.2 CKD (CHRONIC KIDNEY DISEASE), STAGE II: ICD-10-CM

## 2021-10-06 DIAGNOSIS — E11.22 CONTROLLED TYPE 2 DIABETES MELLITUS WITH STAGE 2 CHRONIC KIDNEY DISEASE, WITH LONG-TERM CURRENT USE OF INSULIN (HCC): ICD-10-CM

## 2021-10-06 DIAGNOSIS — Z23 NEEDS FLU SHOT: ICD-10-CM

## 2021-10-06 DIAGNOSIS — N18.2 CONTROLLED TYPE 2 DIABETES MELLITUS WITH STAGE 2 CHRONIC KIDNEY DISEASE, WITH LONG-TERM CURRENT USE OF INSULIN (HCC): ICD-10-CM

## 2021-10-06 LAB
ALBUMIN SERPL-MCNC: 3.9 G/DL (ref 3.5–5)
ALBUMIN/GLOB SERPL: 1.1 {RATIO} (ref 1.1–2.2)
ALP SERPL-CCNC: 130 U/L (ref 45–117)
ALT SERPL-CCNC: 120 U/L (ref 12–78)
ANION GAP SERPL CALC-SCNC: 10 MMOL/L (ref 5–15)
AST SERPL-CCNC: 98 U/L (ref 15–37)
BILIRUB SERPL-MCNC: 0.6 MG/DL (ref 0.2–1)
BUN SERPL-MCNC: 29 MG/DL (ref 6–20)
BUN/CREAT SERPL: 24 (ref 12–20)
CALCIUM SERPL-MCNC: 9.4 MG/DL (ref 8.5–10.1)
CHLORIDE SERPL-SCNC: 101 MMOL/L (ref 97–108)
CHOLEST SERPL-MCNC: 227 MG/DL
CK SERPL-CCNC: 43 U/L (ref 26–192)
CO2 SERPL-SCNC: 26 MMOL/L (ref 21–32)
CREAT SERPL-MCNC: 1.2 MG/DL (ref 0.55–1.02)
EST. AVERAGE GLUCOSE BLD GHB EST-MCNC: 275 MG/DL
GLOBULIN SER CALC-MCNC: 3.5 G/DL (ref 2–4)
GLUCOSE SERPL-MCNC: 338 MG/DL (ref 65–100)
HBA1C MFR BLD: 11.2 % (ref 4–5.6)
HDLC SERPL-MCNC: 44 MG/DL
HDLC SERPL: 5.2 {RATIO} (ref 0–5)
LDLC SERPL CALC-MCNC: ABNORMAL MG/DL (ref 0–100)
LDLC SERPL DIRECT ASSAY-MCNC: 129 MG/DL (ref 0–100)
POTASSIUM SERPL-SCNC: 3.7 MMOL/L (ref 3.5–5.1)
PROT SERPL-MCNC: 7.4 G/DL (ref 6.4–8.2)
SODIUM SERPL-SCNC: 137 MMOL/L (ref 136–145)
TRIGL SERPL-MCNC: 531 MG/DL (ref ?–150)
VLDLC SERPL CALC-MCNC: ABNORMAL MG/DL

## 2021-10-06 PROCEDURE — G9708 BILAT MAST/HX BI /UNILAT MAS: HCPCS | Performed by: INTERNAL MEDICINE

## 2021-10-06 PROCEDURE — G0008 ADMIN INFLUENZA VIRUS VAC: HCPCS | Performed by: INTERNAL MEDICINE

## 2021-10-06 PROCEDURE — 1090F PRES/ABSN URINE INCON ASSESS: CPT | Performed by: INTERNAL MEDICINE

## 2021-10-06 PROCEDURE — 3046F HEMOGLOBIN A1C LEVEL >9.0%: CPT | Performed by: INTERNAL MEDICINE

## 2021-10-06 PROCEDURE — 3017F COLORECTAL CA SCREEN DOC REV: CPT | Performed by: INTERNAL MEDICINE

## 2021-10-06 PROCEDURE — G8536 NO DOC ELDER MAL SCRN: HCPCS | Performed by: INTERNAL MEDICINE

## 2021-10-06 PROCEDURE — 99214 OFFICE O/P EST MOD 30 MIN: CPT | Performed by: INTERNAL MEDICINE

## 2021-10-06 PROCEDURE — 1101F PT FALLS ASSESS-DOCD LE1/YR: CPT | Performed by: INTERNAL MEDICINE

## 2021-10-06 PROCEDURE — G8755 DIAS BP > OR = 90: HCPCS | Performed by: INTERNAL MEDICINE

## 2021-10-06 PROCEDURE — G8399 PT W/DXA RESULTS DOCUMENT: HCPCS | Performed by: INTERNAL MEDICINE

## 2021-10-06 PROCEDURE — G9717 DOC PT DX DEP/BP F/U NT REQ: HCPCS | Performed by: INTERNAL MEDICINE

## 2021-10-06 PROCEDURE — G8417 CALC BMI ABV UP PARAM F/U: HCPCS | Performed by: INTERNAL MEDICINE

## 2021-10-06 PROCEDURE — G8427 DOCREV CUR MEDS BY ELIG CLIN: HCPCS | Performed by: INTERNAL MEDICINE

## 2021-10-06 PROCEDURE — 90694 VACC AIIV4 NO PRSRV 0.5ML IM: CPT | Performed by: INTERNAL MEDICINE

## 2021-10-06 PROCEDURE — 2022F DILAT RTA XM EVC RTNOPTHY: CPT | Performed by: INTERNAL MEDICINE

## 2021-10-06 PROCEDURE — G8753 SYS BP > OR = 140: HCPCS | Performed by: INTERNAL MEDICINE

## 2021-10-06 NOTE — PROGRESS NOTES
Robert De Luna is a 78 y.o. female    Chief Complaint   Patient presents with    Labs     3 month follow up       Visit Vitals  BP (!) 140/110 (BP 1 Location: Left upper arm, BP Patient Position: Sitting, BP Cuff Size: Small adult)   Pulse 95   Temp 98.6 °F (37 °C)   Resp 16   Ht 5' 3\" (1.6 m)   Wt 203 lb 8 oz (92.3 kg)   SpO2 97%   BMI 36.05 kg/m²           1. Have you been to the ER, urgent care clinic since your last visit? Hospitalized since your last visit? NO    2. Have you seen or consulted any other health care providers outside of the [address] [place] [address] since your last visit? Include any pap smears or colon screening.  NO

## 2021-10-06 NOTE — PATIENT INSTRUCTIONS
Learning About ACE Inhibitors and ARBs for Diabetes  Introduction     ACE inhibitors and ARBs are medicines used to manage blood pressure. They allow blood vessels to relax and open up. This lowers your blood pressure. When you have diabetes, taking an ACE inhibitor or ARB can help to:  · Treat high blood pressure. Your risk of problems from diabetes goes up when you have high blood pressure. · Prevent or slow kidney damage. Diabetes can damage the blood vessels in the kidneys. High blood pressure can damage the kidneys, too. · Lower the risks of stroke and heart attack. Your risks go up when you have high blood pressure, heart disease, or both. An ACE inhibitor or ARB is a good choice for people with diabetes. Unlike some medicines, these don't affect blood sugar levels. Examples  ACE inhibitors include:  · Benazepril. · Lisinopril. · Ramipril. ARBs include:  · Irbesartan. · Losartan. · Telmisartan. Possible side effects  All medicines can cause side effects. Some side effects of ACE inhibitors include:  · Low blood pressure. You may feel dizzy and weak. · A dry cough. · High potassium levels. · Swelling of your lips, tongue, or face. If the swelling is severe, you may need treatment right away. Severe swelling can make it hard to breathe, but this is rare. Some side effects of ARBs include:  · Low blood pressure. You may feel dizzy and weak. · High potassium levels. You may have other side effects or reactions not listed here. Check the information that comes with your medicine. What to know about taking this medicine  · Be safe with medicines. Take your medicines exactly as prescribed. Call your doctor if you think you are having a problem with your medicine. · Before starting an ACE inhibitor or ARB, tell your doctor if you:  ? Use a salt substitute. ? Take diuretics or potassium tablets. · These medicines are not safe for pregnancy.  If you are pregnant or planning to be, talk to your doctor about a safe blood pressure medicine. · ACE inhibitors can cause a dry cough. If the cough is bad, talk to your doctor. Switching to an ARB is likely to help. · Taking some medicines together can cause problems. Tell your doctor or pharmacist all the medicines you take. This includes over-the-counter medicines, vitamins, herbal products, and supplements. · You may need regular blood and urine tests. Where can you learn more? Go to http://www.devine.com/  Enter M316 in the search box to learn more about \"Learning About ACE Inhibitors and ARBs for Diabetes. \"  Current as of: August 31, 2020               Content Version: 13.0  © 2132-2787 [employer]. Care instructions adapted under license by [employer] (which disclaims liability or warranty for this information). If you have questions about a medical condition or this instruction, always ask your healthcare professional. [address] any warranty or liability for your use of this information.

## 2021-10-06 NOTE — PROGRESS NOTES
After obtaining consent and per verbal order from Dr. Holli De León, patient received influenza vaccine given by Azul Vides and verified by Kaylie Finley. Fluad Influenza 0.5ml was given IM in right deltoid. Patient tolerated injection and was observed for 10 minutes post injection. VIS was given.

## 2021-10-09 NOTE — PROGRESS NOTES
Glyco and TG very high so increase Lantus to 28 U BID and diet, Increased chol and LDL so change Pravachol to Crestor 20 QD

## 2021-10-21 RX ORDER — ROSUVASTATIN CALCIUM 20 MG/1
20 TABLET, COATED ORAL
Qty: 90 TABLET | Refills: 3 | Status: SHIPPED | OUTPATIENT
Start: 2021-10-21 | End: 2022-10-20 | Stop reason: SDUPTHER

## 2021-10-21 NOTE — TELEPHONE ENCOUNTER
RX refill request from the patient/pharmacy. Patient last seen 10- with labs, and next appt. scheduled for 10-  Requested Prescriptions     Pending Prescriptions Disp Refills    rosuvastatin (CRESTOR) 20 mg tablet 90 Tablet 3     Sig: Take 1 Tablet by mouth nightly. Hood Stanley

## 2021-10-26 RX ORDER — INSULIN GLARGINE 100 [IU]/ML
INJECTION, SOLUTION SUBCUTANEOUS
Qty: 10 PEN | Refills: 3 | Status: ON HOLD | OUTPATIENT
Start: 2021-10-26 | End: 2021-12-16 | Stop reason: SDUPTHER

## 2021-10-26 NOTE — TELEPHONE ENCOUNTER
RX refill request from the patient/pharmacy. Patient last seen 10- with labs, and next appt. scheduled for 10-  Requested Prescriptions     Pending Prescriptions Disp Refills    insulin glargine (Lantus Solostar U-100 Insulin) 100 unit/mL (3 mL) inpn [Pharmacy Med Name: Liv Stephen PEN 100U/ML] 10 Pen 3     Sig: INJECT 30 UNITS DAILY   .

## 2021-11-09 RX ORDER — GLIMEPIRIDE 4 MG/1
TABLET ORAL
Qty: 180 TABLET | Refills: 3 | Status: SHIPPED | OUTPATIENT
Start: 2021-11-09 | End: 2022-03-11 | Stop reason: ALTCHOICE

## 2021-11-09 NOTE — TELEPHONE ENCOUNTER
RX refill request from the patient/pharmacy. Patient last seen 10- with labs, and next appt. scheduled for 11-  Requested Prescriptions     Pending Prescriptions Disp Refills    glimepiride (AMARYL) 4 mg tablet [Pharmacy Med Name: GLIMEPIRIDE TABS 4MG] 180 Tablet 3     Sig: TAKE 1 TABLET TWICE A DAY (DOSE/DIRECTIONS CHANGED)   .

## 2021-11-12 ENCOUNTER — OFFICE VISIT (OUTPATIENT)
Dept: INTERNAL MEDICINE CLINIC | Age: 76
End: 2021-11-12
Payer: MEDICARE

## 2021-11-12 ENCOUNTER — TRANSCRIBE ORDER (OUTPATIENT)
Dept: SCHEDULING | Age: 76
End: 2021-11-12

## 2021-11-12 VITALS
WEIGHT: 204.7 LBS | HEART RATE: 102 BPM | RESPIRATION RATE: 18 BRPM | TEMPERATURE: 98.2 F | HEIGHT: 63 IN | OXYGEN SATURATION: 94 % | SYSTOLIC BLOOD PRESSURE: 126 MMHG | BODY MASS INDEX: 36.27 KG/M2 | DIASTOLIC BLOOD PRESSURE: 80 MMHG

## 2021-11-12 DIAGNOSIS — N18.2 CONTROLLED TYPE 2 DIABETES MELLITUS WITH STAGE 2 CHRONIC KIDNEY DISEASE, WITH LONG-TERM CURRENT USE OF INSULIN (HCC): ICD-10-CM

## 2021-11-12 DIAGNOSIS — E66.09 CLASS 1 OBESITY DUE TO EXCESS CALORIES WITHOUT SERIOUS COMORBIDITY WITH BODY MASS INDEX (BMI) OF 33.0 TO 33.9 IN ADULT: ICD-10-CM

## 2021-11-12 DIAGNOSIS — E11.22 CONTROLLED TYPE 2 DIABETES MELLITUS WITH STAGE 2 CHRONIC KIDNEY DISEASE, WITH LONG-TERM CURRENT USE OF INSULIN (HCC): ICD-10-CM

## 2021-11-12 DIAGNOSIS — I12.9 HYPERTENSION WITH RENAL DISEASE: Primary | ICD-10-CM

## 2021-11-12 DIAGNOSIS — J84.9 ILD (INTERSTITIAL LUNG DISEASE) (HCC): Primary | ICD-10-CM

## 2021-11-12 DIAGNOSIS — Z79.4 CONTROLLED TYPE 2 DIABETES MELLITUS WITH STAGE 2 CHRONIC KIDNEY DISEASE, WITH LONG-TERM CURRENT USE OF INSULIN (HCC): ICD-10-CM

## 2021-11-12 DIAGNOSIS — E78.2 MIXED HYPERLIPIDEMIA: ICD-10-CM

## 2021-11-12 PROCEDURE — 1101F PT FALLS ASSESS-DOCD LE1/YR: CPT | Performed by: INTERNAL MEDICINE

## 2021-11-12 PROCEDURE — G8754 DIAS BP LESS 90: HCPCS | Performed by: INTERNAL MEDICINE

## 2021-11-12 PROCEDURE — G8536 NO DOC ELDER MAL SCRN: HCPCS | Performed by: INTERNAL MEDICINE

## 2021-11-12 PROCEDURE — G8752 SYS BP LESS 140: HCPCS | Performed by: INTERNAL MEDICINE

## 2021-11-12 PROCEDURE — 3046F HEMOGLOBIN A1C LEVEL >9.0%: CPT | Performed by: INTERNAL MEDICINE

## 2021-11-12 PROCEDURE — 1090F PRES/ABSN URINE INCON ASSESS: CPT | Performed by: INTERNAL MEDICINE

## 2021-11-12 PROCEDURE — 3017F COLORECTAL CA SCREEN DOC REV: CPT | Performed by: INTERNAL MEDICINE

## 2021-11-12 PROCEDURE — 99213 OFFICE O/P EST LOW 20 MIN: CPT | Performed by: INTERNAL MEDICINE

## 2021-11-12 PROCEDURE — 2022F DILAT RTA XM EVC RTNOPTHY: CPT | Performed by: INTERNAL MEDICINE

## 2021-11-12 PROCEDURE — G8399 PT W/DXA RESULTS DOCUMENT: HCPCS | Performed by: INTERNAL MEDICINE

## 2021-11-12 PROCEDURE — G9708 BILAT MAST/HX BI /UNILAT MAS: HCPCS | Performed by: INTERNAL MEDICINE

## 2021-11-12 PROCEDURE — G8427 DOCREV CUR MEDS BY ELIG CLIN: HCPCS | Performed by: INTERNAL MEDICINE

## 2021-11-12 PROCEDURE — G9717 DOC PT DX DEP/BP F/U NT REQ: HCPCS | Performed by: INTERNAL MEDICINE

## 2021-11-12 PROCEDURE — G8417 CALC BMI ABV UP PARAM F/U: HCPCS | Performed by: INTERNAL MEDICINE

## 2021-11-12 NOTE — PROGRESS NOTES
Chief Complaint   Patient presents with    Hypertension     1 month follow up       SUBJECTIVE:    Nelson Farias is a 78 y.o. female seen here found to have diabetes out-of-control along with hyperlipidemia not controlled and also for control of her hypertension. At that point she had not taken her blood pressure medicine the morning so rather than change medicines there we asked her to take her medicine and come back today. We did increase her insulin to 28 units twice a day for diabetes and we changed her Protonix to Crestor for cholesterol. She has been tolerating no changes. She is did see Dr. Maris Hopkins her pulmonologist yesterday and she does have a little bit of a respiratory infection so he increased her prednisone to 40 mg a day for 5 days and then he is going to taper it down and increased with doxycycline 100 mg once daily 200 mg twice daily. She does have a little cough but no sputum production. She notes no fevers or chills. Her shortness of breath seems to be about baseline although may be a little better since she took 40 of prednisone today. Current Outpatient Medications   Medication Sig Dispense Refill    glimepiride (AMARYL) 4 mg tablet TAKE 1 TABLET TWICE A DAY (DOSE/DIRECTIONS CHANGED) 180 Tablet 3    insulin glargine (Lantus Solostar U-100 Insulin) 100 unit/mL (3 mL) inpn INJECT 30 UNITS DAILY 10 Pen 3    rosuvastatin (CRESTOR) 20 mg tablet Take 1 Tablet by mouth nightly.  90 Tablet 3    buPROPion XL (WELLBUTRIN XL) 150 mg tablet TAKE 1 TABLET DAILY 90 Tablet 3    benzonatate (TESSALON) 200 mg capsule TAKE 1 CAPSULE BY MOUTH THREE TIMES DAILY AS NEEDED FOR COUGH - SWALLOW CAPSULE WHOLE (DO NOT CRUSH) 60 Capsule 0    predniSONE (DELTASONE) 20 mg tablet Take one tablet daily 90 Tablet 3    Januvia 100 mg tablet TAKE 1 TABLET DAILY 90 Tablet 3    sertraline (ZOLOFT) 100 mg tablet TAKE 1 TABLET DAILY 90 Tablet 3    carvediloL (COREG) 6.25 mg tablet TAKE 1 TABLET TWICE A  Tablet 3    BD Ultra-Fine Short Pen Needle 31 gauge x 5/16\" ndle USE UNDER THE SKIN DAILY. USE WITH LANTUS FLEX PEN DAILY 1 Package 3    fenofibrate nanocrystallized (TRICOR) 145 mg tablet Take 1 Tablet by mouth daily. 90 Tablet 3    insulin glargine (LANTUS,BASAGLAR) 100 unit/mL (3 mL) inpn Take 30 units daily 30 Units 0    metFORMIN ER (GLUCOPHAGE XR) 500 mg tablet Take two tablet daily with dinner. 60 Tab 0    hydroCHLOROthiazide (HYDRODIURIL) 25 mg tablet TAKE 1 TABLET DAILY FOR FLUID AND BLOOD PRESSURE 90 Tab 3    glucose blood VI test strips (True Metrix Glucose Test Strip) strip USE ONCE DAILY AND RECORD RESULTS IN A NOTEBOOK ALSO RECORD LAST MEALTIME IN NOTEBOOK 100 Strip 3    clemastine 2.68 mg tab tablet Take 2.68 mg by mouth two (2) times a day.  doxycycline (VIBRAMYCIN) 100 mg capsule TAKE 1 (ONE) CAPSULE BY MOUTH TWICE DAILY WITH FOOD      oxazepam (SERAX) 15 mg capsule TAKE 2 CAPSULES BY MOUTH NIGHTLY AT BEDTIME 180 Cap 1    mupirocin (BACTROBAN) 2 % ointment Apply  to affected area three (3) times daily. 22 g 1    clindamycin (CLEOCIN) 300 mg capsule Take 2 capsules 1 hour before dental procedure 10 Cap 0    pantoprazole (PROTONIX) 40 mg tablet Take 40 mg by mouth daily.  montelukast sodium (SINGULAIR PO) Take 10 mg by mouth.  10 mg tab       Past Medical History:   Diagnosis Date    Abnormal LFTs 08/24/2017    FATTY LIVER    Arthritis     OSTEO    Avascular necrosis of hip ([address] 08/24/2017    BILAT HIPS    Breast CA ([address] 1989, 2001    BILATERAL; SURGERY, CHEMO    Chronic pain     CKD (chronic kidney disease), stage II 8/24/2017    Coagulation disorder ([address] 1984    ITP  (DR CHU BRADSHAW) - PLATELETS DROPPED TO 5K    Depression     Diabetes ([address]     TYPE 2; NIDDM    DJD (degenerative joint disease), multiple sites 8/24/2017    GI bleed 2008    HEMORRHOIDS    Hyperlipemia     Hypertension with renal disease 8/24/2017    IBS (irritable bowel syndrome) 8/24/2017    Ill-defined condition 2015    PNEUMONIA X2 - HOSPITALIZED IN 2015    Interstitial lung disease ([address] 04/01/2019    Liver disease     FATTY LIVER    Myalgia 8/24/2017    On statin therapy 8/24/2017    Overactive bladder 8/24/2017    Pneumonia 04/2015    HOSPITALIZED 3 WEEKS.     Polymyalgia rheumatica ([address] 8/24/2017    Prophylactic antibiotic 8/24/2017    Reflex abnormality     acid reflex    Rosacea      Past Surgical History:   Procedure Laterality Date    HX APPENDECTOMY  1950    HX BREAST BIOPSY Bilateral     HX CATARACT REMOVAL Bilateral     HX COLONOSCOPY      HX ENDOSCOPY      HX GI      COLONOSCOPY    HX HEENT      WISDOM TEETH    HX HYSTERECTOMY  2000S    HX MASTECTOMY Right 1989    HX MASTECTOMY Left 2001    HX ORTHOPAEDIC  2008    LUMBAR FUSION    HX ORTHOPAEDIC  2018    RE-DO LUMBAR FUSION, AND ADDED THORACIC FUSION    HX ORTHOPAEDIC  03/26/2019    neckectomy    HX TUBAL LIGATION  1981    HX UROLOGICAL  2000s    BLADDER SLING    SC BREAST SURGERY PROCEDURE UNLISTED  1993, 2002    RECONSTRUCTION X2    SC TOTAL HIP ARTHROPLASTY Left 11/16/2016    ANTERIOR APPROACH, DR Gwendolyn De La Torre; (POSTOP: STANDS ONE INCH TALLER ON LEFT FOOT)    SC TOTAL HIP ARTHROPLASTY Right 12/2016    ANTERIOR APPROACH (DR JAIME)     Allergies   Allergen Reactions    Metformin Diarrhea    Statins-Hmg-Coa Reductase Inhibitors Myalgia     Myalgia with  multiple statins  Takes pravachol     Codeine Rash     Rash on thighs    Darvon [Propoxyphene] Other (comments)     \"I see worms\"    Pcn [Penicillins] Rash    Sulfa (Sulfonamide Antibiotics) Rash       REVIEW OF SYSTEMS:  General: negative for - chills or fever, or weight loss or gain  ENT: negative for - headaches, nasal congestion or tinnitus  Eyes: no blurred or visual changes  Neck: No stiffness or swollen nodes  Respiratory: negative for - cough, hemoptysis, shortness of breath or wheezing  Cardiovascular : negative for - chest pain, edema, palpitations or shortness of breath  Gastrointestinal: negative for - abdominal pain, blood in stools, heartburn or nausea/vomiting  Genito-Urinary: no dysuria, trouble voiding, or hematuria  Musculoskeletal: negative for - gait disturbance, joint pain, joint stiffness or joint swelling  Neurological: no TIA or stroke symptoms  Hematologic: no bruises, no bleeding  Lymphatic: no swollen glands  Integument: no lumps, mole changes, nail changes or rash  Endocrine:no malaise/lethargy poly uria or polydipsia or unexpected weight changes        Social History     Socioeconomic History    Marital status:    Tobacco Use    Smoking status: Never Smoker    Smokeless tobacco: Never Used   Vaping Use    Vaping Use: Never used   Substance and Sexual Activity    Alcohol use: No    Drug use: No    Sexual activity: Never     Family History   Problem Relation Age of Onset    Heart Disease Mother     Kidney Disease Mother     Lung Disease Mother         COPD    Diabetes Brother     Pacemaker Brother     Kidney Disease Brother     Hypertension Brother     Anesth Problems Neg Hx        OBJECTIVE:     Visit Vitals  /80 (BP 1 Location: Left upper arm, BP Patient Position: Sitting, BP Cuff Size: Large adult)   Pulse (!) 102   Temp 98.2 °F (36.8 °C) (Oral)   Resp 18   Ht 5' 3\" (1.6 m)   Wt 204 lb 11.2 oz (92.9 kg)   SpO2 94%   BMI 36.26 kg/m²     CONSTITUTIONAL:   well nourished, appears age appropriate  EYES: sclera anicteric, PERRL, EOMI  ENMT:nares clear, moist mucous membranes, pharynx clear  NECK: supple.  Thyroid normal, No JVD or bruits  RESPIRATORY: Chest: clear to ascultation and percussion, normal inspiratory effort  CARDIOVASCULAR: Heart: regular rate and rhythm no murmurs, rubs or gallops, PMI not displaced, No thrills, no peripheral edema  GASTROINTESTINAL: Abdomen: non distended, soft, non tender, bowel sounds normal  HEMATOLOGIC: no purpura, petechiae or bruising  LYMPHATIC: No lymph node enlargemant  MUSCULOSKELETAL: Extremities: no active synovitis, pulse 1+   INTEGUMENT: No unusual rashes or suspicious skin lesions noted. Nails appear normal.  PERIPHERAL VASCULAR: normal pulses femoral, PT and DP  NEUROLOGIC: non-focal exam, A & O X 3  PSYCHIATRIC:, appropriate affect     ASSESSMENT:   1. Hypertension with renal disease    2. Class 1 obesity due to excess calories without serious comorbidity with body mass index (BMI) of 33.0 to 33.9 in adult    3. Controlled type 2 diabetes mellitus with stage 2 chronic kidney disease, with long-term current use of insulin ([facility]    4. Mixed hyperlipidemia      Impression  1. Hypertension that is now well controlled today. 2.  Obesity her weight unfortunately is up 1 pound but some of that could be the increased dose of prednisone  3. Diabetes mellitus she says her blood sugars are okay as long she takes her medicine and watches her diet and I encouraged her to do that. 4.  Hyperlipidemia as noted we switch Pravachol to Crestor  I will recheck her as her prior scheduled appointment in January at which time we will check lab studies. PLAN:  . No orders of the defined types were placed in this encounter. ATTENTION:   This medical record was transcribed using an electronic medical records system. Although proofread, it may and can contain electronic and spelling errors. Other human spelling and other errors may be present. Corrections may be executed at a later time. Please feel free to contact us for any clarifications as needed. Follow-up and Dispositions    · Return in about 2 months (around 1/12/2022). No results found for any visits on 11/12/21. Hardin Mohs, MD    The patient verbalized understanding of the problems and plans as explained.

## 2021-11-12 NOTE — PATIENT INSTRUCTIONS
Managing Your Allergies: Care Instructions  Your Care Instructions     Managing your allergies is an important part of staying healthy. Your doctor will help you find out what may be the cause of the allergies. Common causes of symptoms are house dust and dust mites, animal dander, mold, and pollen. As soon as you know what triggers your symptoms, try to avoid those things. This can help prevent allergy symptoms, asthma, and other health problems. Ask your doctor about allergy medicine or immunotherapy. These treatments may help reduce or prevent allergy symptoms. Follow-up care is a key part of your treatment and safety. Be sure to make and go to all appointments, and call your doctor if you are having problems. It's also a good idea to know your test results and keep a list of the medicines you take. How can you care for yourself at home? · If you have been told by your doctor that dust or dust mites are causing your allergy, decrease the dust around your bed:  ? Wash sheets, pillowcases, and other bedding in hot water every week. ? Use dust-proof covers for pillows, duvets, and mattresses. Avoid plastic covers because they tear easily and do not \"breathe. \" Wash as instructed on the label. ? Do not use any blankets and pillows that you do not need. ? Use blankets that you can wash in your washing machine. ? Consider removing drapes and carpets, which attract and hold dust, from your bedroom. · If you are allergic to house dust and mites, do not use home humidifiers. Your doctor can suggest ways you can control dust and mites. · Look for signs of cockroaches. Cockroaches cause allergic reactions. Use cockroach baits to get rid of them. Then, clean your home well. Cockroaches like areas where grocery bags, newspapers, empty bottles, or cardboard boxes are stored. Do not keep these inside your home, and keep trash and food containers sealed.  Seal off any spots where cockroaches might enter your home.  · If you are allergic to mold, get rid of furniture, rugs, and drapes that smell musty. Check for mold in the bathroom. · If you are allergic to outdoor pollen or mold spores, use air-conditioning. Change or clean all filters every month. Keep windows closed. · If you are allergic to pollen, stay inside when pollen counts are high. Use a vacuum  with a HEPA filter or a double-thickness filter at least two times each week. · Stay inside when air pollution is bad. Avoid paint fumes, perfumes, and other strong odors. · Avoid conditions that make your allergies worse. Stay away from smoke. Do not smoke or let anyone else smoke in your house. Do not use fireplaces or wood-burning stoves. · If you are allergic to your pets, change the air filter in your furnace every month. Use high-efficiency filters. · If you are allergic to pet dander, keep pets outside or out of your bedroom. Old carpet and cloth furniture can hold a lot of animal dander. You may need to replace them. When should you call for help? Give an epinephrine shot if:    · You think you are having a severe allergic reaction. After giving an epinephrine shot call 911, even if you feel better. Call 911 if:    · You have symptoms of a severe allergic reaction. These may include:  ? Sudden raised, red areas (hives) all over your body. ? Swelling of the throat, mouth, lips, or tongue. ? Trouble breathing. ? Passing out (losing consciousness). Or you may feel very lightheaded or suddenly feel weak, confused, or restless.     · You have been given an epinephrine shot, even if you feel better. Call your doctor now or seek immediate medical care if:    · You have symptoms of an allergic reaction, such as:  ? A rash or hives (raised, red areas on the skin). ? Itching. ? Swelling. ? Belly pain, nausea, or vomiting.    Watch closely for changes in your health, and be sure to contact your doctor if:    · Your allergies get worse.     · You need help controlling your allergies.     · You have questions about allergy testing.     · You do not get better as expected. Where can you learn more? Go to http://www.gray.com/  Enter L249 in the search box to learn more about \"Managing Your Allergies: Care Instructions. \"  Current as of: February 10, 2021               Content Version: 13.0  © 3877-2472 [employer]. Care instructions adapted under license by [employer] (which disclaims liability or warranty for this information). If you have questions about a medical condition or this instruction, always ask your healthcare professional. [place] [address] any warranty or liability for your use of this information.

## 2021-11-12 NOTE — PROGRESS NOTES
Chief Complaint   Patient presents with    Hypertension     1 month follow up     Visit Vitals  /80 (BP 1 Location: Left upper arm, BP Patient Position: Sitting, BP Cuff Size: Large adult)   Pulse (!) 102   Temp 98.2 °F (36.8 °C) (Oral)   Resp 18   Ht 5' 3\" (1.6 m)   Wt 204 lb 11.2 oz (92.9 kg)   SpO2 94%   BMI 36.26 kg/m²     1. Have you been to the ER, urgent care clinic since your last visit? Hospitalized since your last visit? No    2. Have you seen or consulted any other health care providers outside of the [address] [place] [address] since your last visit? Include any pap smears or colon screening.  Dr Childs-pulmonology 11/11/21

## 2021-12-11 ENCOUNTER — APPOINTMENT (OUTPATIENT)
Dept: CT IMAGING | Age: 76
DRG: 637 | End: 2021-12-11
Attending: EMERGENCY MEDICINE
Payer: MEDICARE

## 2021-12-11 ENCOUNTER — APPOINTMENT (OUTPATIENT)
Dept: GENERAL RADIOLOGY | Age: 76
DRG: 637 | End: 2021-12-11
Attending: EMERGENCY MEDICINE
Payer: MEDICARE

## 2021-12-11 ENCOUNTER — HOSPITAL ENCOUNTER (INPATIENT)
Age: 76
LOS: 5 days | Discharge: HOME HEALTH | DRG: 637 | End: 2021-12-16
Attending: EMERGENCY MEDICINE | Admitting: INTERNAL MEDICINE
Payer: MEDICARE

## 2021-12-11 DIAGNOSIS — E11.65 HYPERGLYCEMIA DUE TO DIABETES MELLITUS (HCC): ICD-10-CM

## 2021-12-11 DIAGNOSIS — J96.21 ACUTE ON CHRONIC RESPIRATORY FAILURE WITH HYPOXIA (HCC): ICD-10-CM

## 2021-12-11 DIAGNOSIS — J96.01 ACUTE RESPIRATORY FAILURE WITH HYPOXIA (HCC): Primary | ICD-10-CM

## 2021-12-11 DIAGNOSIS — J84.9 ILD (INTERSTITIAL LUNG DISEASE) (HCC): ICD-10-CM

## 2021-12-11 PROBLEM — J96.20 ACUTE ON CHRONIC RESPIRATORY FAILURE (HCC): Status: ACTIVE | Noted: 2021-12-11

## 2021-12-11 LAB
ALBUMIN SERPL-MCNC: 3 G/DL (ref 3.5–5)
ALBUMIN/GLOB SERPL: 0.7 {RATIO} (ref 1.1–2.2)
ALP SERPL-CCNC: 111 U/L (ref 45–117)
ALT SERPL-CCNC: 65 U/L (ref 12–78)
ANION GAP SERPL CALC-SCNC: 8 MMOL/L (ref 5–15)
AST SERPL-CCNC: 38 U/L (ref 15–37)
ATRIAL RATE: 106 BPM
BASOPHILS # BLD: 0.1 K/UL (ref 0–0.1)
BASOPHILS NFR BLD: 1 % (ref 0–1)
BILIRUB SERPL-MCNC: 0.8 MG/DL (ref 0.2–1)
BUN SERPL-MCNC: 17 MG/DL (ref 6–20)
BUN/CREAT SERPL: 17 (ref 12–20)
CALCIUM SERPL-MCNC: 9.8 MG/DL (ref 8.5–10.1)
CALCULATED P AXIS, ECG09: 13 DEGREES
CALCULATED R AXIS, ECG10: -61 DEGREES
CALCULATED T AXIS, ECG11: 10 DEGREES
CHLORIDE SERPL-SCNC: 100 MMOL/L (ref 97–108)
CO2 SERPL-SCNC: 26 MMOL/L (ref 21–32)
COMMENT, HOLDF: NORMAL
COVID-19 RAPID TEST, COVR: NOT DETECTED
CREAT SERPL-MCNC: 0.98 MG/DL (ref 0.55–1.02)
DIAGNOSIS, 93000: NORMAL
DIFFERENTIAL METHOD BLD: ABNORMAL
EOSINOPHIL # BLD: 0 K/UL (ref 0–0.4)
EOSINOPHIL NFR BLD: 0 % (ref 0–7)
ERYTHROCYTE [DISTWIDTH] IN BLOOD BY AUTOMATED COUNT: 13 % (ref 11.5–14.5)
GLOBULIN SER CALC-MCNC: 4.4 G/DL (ref 2–4)
GLUCOSE BLD STRIP.AUTO-MCNC: 357 MG/DL (ref 65–117)
GLUCOSE BLD STRIP.AUTO-MCNC: 364 MG/DL (ref 65–117)
GLUCOSE BLD STRIP.AUTO-MCNC: 403 MG/DL (ref 65–117)
GLUCOSE BLD STRIP.AUTO-MCNC: 470 MG/DL (ref 65–117)
GLUCOSE SERPL-MCNC: 341 MG/DL (ref 65–100)
HCT VFR BLD AUTO: 44.6 % (ref 35–47)
HGB BLD-MCNC: 15.3 G/DL (ref 11.5–16)
IMM GRANULOCYTES # BLD AUTO: 0.1 K/UL (ref 0–0.04)
IMM GRANULOCYTES NFR BLD AUTO: 1 % (ref 0–0.5)
INR PPP: 1.1 (ref 0.9–1.1)
LACTATE BLD-SCNC: 1.62 MMOL/L (ref 0.4–2)
LYMPHOCYTES # BLD: 0.4 K/UL (ref 0.8–3.5)
LYMPHOCYTES NFR BLD: 5 % (ref 12–49)
MAGNESIUM SERPL-MCNC: 2 MG/DL (ref 1.6–2.4)
MCH RBC QN AUTO: 32.9 PG (ref 26–34)
MCHC RBC AUTO-ENTMCNC: 34.3 G/DL (ref 30–36.5)
MCV RBC AUTO: 95.9 FL (ref 80–99)
MONOCYTES # BLD: 0.3 K/UL (ref 0–1)
MONOCYTES NFR BLD: 4 % (ref 5–13)
NEUTS SEG # BLD: 7.7 K/UL (ref 1.8–8)
NEUTS SEG NFR BLD: 89 % (ref 32–75)
NRBC # BLD: 0 K/UL (ref 0–0.01)
NRBC BLD-RTO: 0 PER 100 WBC
P-R INTERVAL, ECG05: 170 MS
PLATELET # BLD AUTO: 226 K/UL (ref 150–400)
PMV BLD AUTO: 9.9 FL (ref 8.9–12.9)
POTASSIUM SERPL-SCNC: 3.4 MMOL/L (ref 3.5–5.1)
PROCALCITONIN SERPL-MCNC: 0.12 NG/ML
PROT SERPL-MCNC: 7.4 G/DL (ref 6.4–8.2)
PROTHROMBIN TIME: 11.3 SEC (ref 9–11.1)
Q-T INTERVAL, ECG07: 372 MS
QRS DURATION, ECG06: 126 MS
QTC CALCULATION (BEZET), ECG08: 494 MS
RBC # BLD AUTO: 4.65 M/UL (ref 3.8–5.2)
RBC MORPH BLD: ABNORMAL
SAMPLES BEING HELD,HOLD: NORMAL
SERVICE CMNT-IMP: ABNORMAL
SODIUM SERPL-SCNC: 134 MMOL/L (ref 136–145)
SOURCE, COVRS: NORMAL
TROPONIN-HIGH SENSITIVITY: 17 NG/L (ref 0–51)
VENTRICULAR RATE, ECG03: 106 BPM
WBC # BLD AUTO: 8.6 K/UL (ref 3.6–11)

## 2021-12-11 PROCEDURE — 74011000250 HC RX REV CODE- 250: Performed by: EMERGENCY MEDICINE

## 2021-12-11 PROCEDURE — 94640 AIRWAY INHALATION TREATMENT: CPT

## 2021-12-11 PROCEDURE — 74011636637 HC RX REV CODE- 636/637: Performed by: INTERNAL MEDICINE

## 2021-12-11 PROCEDURE — 74011250637 HC RX REV CODE- 250/637: Performed by: INTERNAL MEDICINE

## 2021-12-11 PROCEDURE — 80053 COMPREHEN METABOLIC PANEL: CPT

## 2021-12-11 PROCEDURE — 74011636637 HC RX REV CODE- 636/637: Performed by: NURSE PRACTITIONER

## 2021-12-11 PROCEDURE — 71275 CT ANGIOGRAPHY CHEST: CPT

## 2021-12-11 PROCEDURE — 99285 EMERGENCY DEPT VISIT HI MDM: CPT

## 2021-12-11 PROCEDURE — 83605 ASSAY OF LACTIC ACID: CPT

## 2021-12-11 PROCEDURE — 74011250637 HC RX REV CODE- 250/637: Performed by: EMERGENCY MEDICINE

## 2021-12-11 PROCEDURE — 85025 COMPLETE CBC W/AUTO DIFF WBC: CPT

## 2021-12-11 PROCEDURE — 83735 ASSAY OF MAGNESIUM: CPT

## 2021-12-11 PROCEDURE — 77010033678 HC OXYGEN DAILY

## 2021-12-11 PROCEDURE — 87040 BLOOD CULTURE FOR BACTERIA: CPT

## 2021-12-11 PROCEDURE — 85610 PROTHROMBIN TIME: CPT

## 2021-12-11 PROCEDURE — 84484 ASSAY OF TROPONIN QUANT: CPT

## 2021-12-11 PROCEDURE — 74011250636 HC RX REV CODE- 250/636: Performed by: EMERGENCY MEDICINE

## 2021-12-11 PROCEDURE — 84145 PROCALCITONIN (PCT): CPT

## 2021-12-11 PROCEDURE — 74011000636 HC RX REV CODE- 636: Performed by: EMERGENCY MEDICINE

## 2021-12-11 PROCEDURE — 77030029684 HC NEB SM VOL KT MONA -A

## 2021-12-11 PROCEDURE — 87635 SARS-COV-2 COVID-19 AMP PRB: CPT

## 2021-12-11 PROCEDURE — 82962 GLUCOSE BLOOD TEST: CPT

## 2021-12-11 PROCEDURE — 71046 X-RAY EXAM CHEST 2 VIEWS: CPT

## 2021-12-11 PROCEDURE — 36415 COLL VENOUS BLD VENIPUNCTURE: CPT

## 2021-12-11 PROCEDURE — 74011250636 HC RX REV CODE- 250/636: Performed by: INTERNAL MEDICINE

## 2021-12-11 PROCEDURE — 65660000000 HC RM CCU STEPDOWN

## 2021-12-11 PROCEDURE — 93005 ELECTROCARDIOGRAM TRACING: CPT

## 2021-12-11 PROCEDURE — 74011000250 HC RX REV CODE- 250: Performed by: INTERNAL MEDICINE

## 2021-12-11 RX ORDER — INSULIN LISPRO 100 [IU]/ML
INJECTION, SOLUTION INTRAVENOUS; SUBCUTANEOUS
Status: DISCONTINUED | OUTPATIENT
Start: 2021-12-11 | End: 2021-12-16 | Stop reason: HOSPADM

## 2021-12-11 RX ORDER — INSULIN LISPRO 100 [IU]/ML
15 INJECTION, SOLUTION INTRAVENOUS; SUBCUTANEOUS ONCE
Status: COMPLETED | OUTPATIENT
Start: 2021-12-11 | End: 2021-12-11

## 2021-12-11 RX ORDER — LIDOCAINE 40 MG/G
CREAM TOPICAL
Status: COMPLETED | OUTPATIENT
Start: 2021-12-11 | End: 2021-12-11

## 2021-12-11 RX ORDER — PANTOPRAZOLE SODIUM 40 MG/1
40 TABLET, DELAYED RELEASE ORAL DAILY
Status: DISCONTINUED | OUTPATIENT
Start: 2021-12-11 | End: 2021-12-16 | Stop reason: HOSPADM

## 2021-12-11 RX ORDER — OXAZEPAM 15 MG/1
15 CAPSULE ORAL
Status: DISCONTINUED | OUTPATIENT
Start: 2021-12-11 | End: 2021-12-11 | Stop reason: ALTCHOICE

## 2021-12-11 RX ORDER — MAGNESIUM SULFATE 100 %
4 CRYSTALS MISCELLANEOUS AS NEEDED
Status: DISCONTINUED | OUTPATIENT
Start: 2021-12-11 | End: 2021-12-16 | Stop reason: HOSPADM

## 2021-12-11 RX ORDER — SERTRALINE HYDROCHLORIDE 50 MG/1
100 TABLET, FILM COATED ORAL DAILY
Status: DISCONTINUED | OUTPATIENT
Start: 2021-12-11 | End: 2021-12-16 | Stop reason: HOSPADM

## 2021-12-11 RX ORDER — POLYETHYLENE GLYCOL 3350 17 G/17G
17 POWDER, FOR SOLUTION ORAL DAILY PRN
Status: DISCONTINUED | OUTPATIENT
Start: 2021-12-11 | End: 2021-12-16 | Stop reason: HOSPADM

## 2021-12-11 RX ORDER — INSULIN GLARGINE 100 [IU]/ML
30 INJECTION, SOLUTION SUBCUTANEOUS DAILY
Status: DISCONTINUED | OUTPATIENT
Start: 2021-12-11 | End: 2021-12-12

## 2021-12-11 RX ORDER — INSULIN LISPRO 100 [IU]/ML
6 INJECTION, SOLUTION INTRAVENOUS; SUBCUTANEOUS ONCE
Status: COMPLETED | OUTPATIENT
Start: 2021-12-11 | End: 2021-12-11

## 2021-12-11 RX ORDER — DEXTROSE 50 % IN WATER (D50W) INTRAVENOUS SYRINGE
12.5-25 AS NEEDED
Status: DISCONTINUED | OUTPATIENT
Start: 2021-12-11 | End: 2021-12-16 | Stop reason: HOSPADM

## 2021-12-11 RX ORDER — GLIMEPIRIDE 4 MG/1
4 TABLET ORAL
Status: DISCONTINUED | OUTPATIENT
Start: 2021-12-11 | End: 2021-12-16 | Stop reason: HOSPADM

## 2021-12-11 RX ORDER — HYDROCODONE POLISTIREX AND CHLORPHENIRAMINE POLISTIREX 10; 8 MG/5ML; MG/5ML
5 SUSPENSION, EXTENDED RELEASE ORAL
Status: COMPLETED | OUTPATIENT
Start: 2021-12-11 | End: 2021-12-11

## 2021-12-11 RX ORDER — ACETAMINOPHEN 325 MG/1
650 TABLET ORAL
Status: DISCONTINUED | OUTPATIENT
Start: 2021-12-11 | End: 2021-12-14 | Stop reason: SDUPTHER

## 2021-12-11 RX ORDER — BUPROPION HYDROCHLORIDE 150 MG/1
150 TABLET ORAL DAILY
Status: DISCONTINUED | OUTPATIENT
Start: 2021-12-11 | End: 2021-12-16 | Stop reason: HOSPADM

## 2021-12-11 RX ORDER — ACETAMINOPHEN 650 MG/1
650 SUPPOSITORY RECTAL
Status: DISCONTINUED | OUTPATIENT
Start: 2021-12-11 | End: 2021-12-16 | Stop reason: HOSPADM

## 2021-12-11 RX ORDER — MONTELUKAST SODIUM 10 MG/1
10 TABLET ORAL
Status: DISCONTINUED | OUTPATIENT
Start: 2021-12-11 | End: 2021-12-16 | Stop reason: HOSPADM

## 2021-12-11 RX ORDER — CARVEDILOL 6.25 MG/1
6.25 TABLET ORAL 2 TIMES DAILY WITH MEALS
Status: DISCONTINUED | OUTPATIENT
Start: 2021-12-11 | End: 2021-12-16 | Stop reason: HOSPADM

## 2021-12-11 RX ORDER — AZITHROMYCIN 500 MG/1
500 TABLET, FILM COATED ORAL DAILY
Status: COMPLETED | OUTPATIENT
Start: 2021-12-11 | End: 2021-12-15

## 2021-12-11 RX ORDER — ONDANSETRON 2 MG/ML
4 INJECTION INTRAMUSCULAR; INTRAVENOUS
Status: DISCONTINUED | OUTPATIENT
Start: 2021-12-11 | End: 2021-12-16 | Stop reason: HOSPADM

## 2021-12-11 RX ORDER — ENOXAPARIN SODIUM 100 MG/ML
40 INJECTION SUBCUTANEOUS DAILY
Status: DISCONTINUED | OUTPATIENT
Start: 2021-12-11 | End: 2021-12-16 | Stop reason: HOSPADM

## 2021-12-11 RX ORDER — ROSUVASTATIN CALCIUM 20 MG/1
20 TABLET, COATED ORAL
Status: DISCONTINUED | OUTPATIENT
Start: 2021-12-11 | End: 2021-12-16 | Stop reason: HOSPADM

## 2021-12-11 RX ORDER — SODIUM CHLORIDE 0.9 % (FLUSH) 0.9 %
5-40 SYRINGE (ML) INJECTION EVERY 8 HOURS
Status: DISCONTINUED | OUTPATIENT
Start: 2021-12-11 | End: 2021-12-16 | Stop reason: HOSPADM

## 2021-12-11 RX ORDER — LORAZEPAM 1 MG/1
1 TABLET ORAL
Status: DISCONTINUED | OUTPATIENT
Start: 2021-12-11 | End: 2021-12-16 | Stop reason: HOSPADM

## 2021-12-11 RX ORDER — FENOFIBRATE 145 MG/1
145 TABLET, COATED ORAL DAILY
Status: DISCONTINUED | OUTPATIENT
Start: 2021-12-11 | End: 2021-12-16 | Stop reason: HOSPADM

## 2021-12-11 RX ORDER — SODIUM CHLORIDE 0.9 % (FLUSH) 0.9 %
5-40 SYRINGE (ML) INJECTION AS NEEDED
Status: DISCONTINUED | OUTPATIENT
Start: 2021-12-11 | End: 2021-12-16 | Stop reason: HOSPADM

## 2021-12-11 RX ORDER — HYDROCHLOROTHIAZIDE 25 MG/1
25 TABLET ORAL DAILY
Status: DISCONTINUED | OUTPATIENT
Start: 2021-12-11 | End: 2021-12-16 | Stop reason: HOSPADM

## 2021-12-11 RX ORDER — ONDANSETRON 4 MG/1
4 TABLET, ORALLY DISINTEGRATING ORAL
Status: DISCONTINUED | OUTPATIENT
Start: 2021-12-11 | End: 2021-12-16 | Stop reason: HOSPADM

## 2021-12-11 RX ORDER — POTASSIUM CHLORIDE 750 MG/1
10 TABLET, FILM COATED, EXTENDED RELEASE ORAL
Status: COMPLETED | OUTPATIENT
Start: 2021-12-11 | End: 2021-12-11

## 2021-12-11 RX ORDER — AZITHROMYCIN 250 MG/1
500 TABLET, FILM COATED ORAL DAILY
Status: DISCONTINUED | OUTPATIENT
Start: 2021-12-11 | End: 2021-12-11 | Stop reason: SDUPTHER

## 2021-12-11 RX ORDER — IPRATROPIUM BROMIDE AND ALBUTEROL SULFATE 2.5; .5 MG/3ML; MG/3ML
3 SOLUTION RESPIRATORY (INHALATION)
Status: DISCONTINUED | OUTPATIENT
Start: 2021-12-11 | End: 2021-12-12

## 2021-12-11 RX ORDER — ACETAMINOPHEN 325 MG/1
650 TABLET ORAL
Status: DISCONTINUED | OUTPATIENT
Start: 2021-12-11 | End: 2021-12-16 | Stop reason: HOSPADM

## 2021-12-11 RX ADMIN — IPRATROPIUM BROMIDE AND ALBUTEROL SULFATE 3 ML: .5; 3 SOLUTION RESPIRATORY (INHALATION) at 08:57

## 2021-12-11 RX ADMIN — METHYLPREDNISOLONE SODIUM SUCCINATE 125 MG: 125 INJECTION, POWDER, FOR SOLUTION INTRAMUSCULAR; INTRAVENOUS at 07:47

## 2021-12-11 RX ADMIN — INSULIN LISPRO 15 UNITS: 100 INJECTION, SOLUTION INTRAVENOUS; SUBCUTANEOUS at 13:30

## 2021-12-11 RX ADMIN — HYDROCHLOROTHIAZIDE 25 MG: 25 TABLET ORAL at 09:39

## 2021-12-11 RX ADMIN — Medication 5 ML: at 14:00

## 2021-12-11 RX ADMIN — SERTRALINE 100 MG: 50 TABLET, FILM COATED ORAL at 09:39

## 2021-12-11 RX ADMIN — IPRATROPIUM BROMIDE AND ALBUTEROL SULFATE 3 ML: .5; 3 SOLUTION RESPIRATORY (INHALATION) at 13:37

## 2021-12-11 RX ADMIN — PANTOPRAZOLE SODIUM 40 MG: 40 TABLET, DELAYED RELEASE ORAL at 09:39

## 2021-12-11 RX ADMIN — HYDROCODONE POLISTIREX AND CHLORPHENIRAMINE POLISTIREX 5 ML: 10; 8 SUSPENSION, EXTENDED RELEASE ORAL at 04:02

## 2021-12-11 RX ADMIN — MONTELUKAST 10 MG: 10 TABLET, FILM COATED ORAL at 22:27

## 2021-12-11 RX ADMIN — ENOXAPARIN SODIUM 40 MG: 100 INJECTION SUBCUTANEOUS at 09:41

## 2021-12-11 RX ADMIN — METHYLPREDNISOLONE SODIUM SUCCINATE 60 MG: 40 INJECTION, POWDER, FOR SOLUTION INTRAMUSCULAR; INTRAVENOUS at 13:46

## 2021-12-11 RX ADMIN — ROSUVASTATIN 20 MG: 20 TABLET, FILM COATED ORAL at 22:27

## 2021-12-11 RX ADMIN — INSULIN LISPRO 6 UNITS: 100 INJECTION, SOLUTION INTRAVENOUS; SUBCUTANEOUS at 22:27

## 2021-12-11 RX ADMIN — GLIMEPIRIDE 4 MG: 4 TABLET ORAL at 17:34

## 2021-12-11 RX ADMIN — HUMAN INSULIN 10 UNITS: 100 INJECTION, SUSPENSION SUBCUTANEOUS at 13:31

## 2021-12-11 RX ADMIN — Medication 10 ML: at 22:27

## 2021-12-11 RX ADMIN — LIDOCAINE: 40 CREAM TOPICAL at 06:04

## 2021-12-11 RX ADMIN — IOPAMIDOL 100 ML: 755 INJECTION, SOLUTION INTRAVENOUS at 07:38

## 2021-12-11 RX ADMIN — FENOFIBRATE 145 MG: 145 TABLET ORAL at 09:38

## 2021-12-11 RX ADMIN — INSULIN LISPRO 15 UNITS: 100 INJECTION, SOLUTION INTRAVENOUS; SUBCUTANEOUS at 16:41

## 2021-12-11 RX ADMIN — GLIMEPIRIDE 4 MG: 4 TABLET ORAL at 09:39

## 2021-12-11 RX ADMIN — CARVEDILOL 6.25 MG: 6.25 TABLET, FILM COATED ORAL at 16:42

## 2021-12-11 RX ADMIN — METHYLPREDNISOLONE SODIUM SUCCINATE 40 MG: 40 INJECTION, POWDER, FOR SOLUTION INTRAMUSCULAR; INTRAVENOUS at 22:26

## 2021-12-11 RX ADMIN — CARVEDILOL 6.25 MG: 6.25 TABLET, FILM COATED ORAL at 09:38

## 2021-12-11 RX ADMIN — INSULIN GLARGINE 30 UNITS: 100 INJECTION, SOLUTION SUBCUTANEOUS at 09:40

## 2021-12-11 RX ADMIN — AZITHROMYCIN 500 MG: 500 TABLET, FILM COATED ORAL at 09:38

## 2021-12-11 RX ADMIN — HUMAN INSULIN 10 UNITS: 100 INJECTION, SUSPENSION SUBCUTANEOUS at 22:27

## 2021-12-11 RX ADMIN — POTASSIUM CHLORIDE 10 MEQ: 750 TABLET, FILM COATED, EXTENDED RELEASE ORAL at 09:38

## 2021-12-11 RX ADMIN — BUPROPION HYDROCHLORIDE 150 MG: 150 TABLET, EXTENDED RELEASE ORAL at 09:38

## 2021-12-11 RX ADMIN — INSULIN LISPRO 15 UNITS: 100 INJECTION, SOLUTION INTRAVENOUS; SUBCUTANEOUS at 09:41

## 2021-12-11 RX ADMIN — IPRATROPIUM BROMIDE AND ALBUTEROL SULFATE 3 ML: .5; 3 SOLUTION RESPIRATORY (INHALATION) at 20:55

## 2021-12-11 NOTE — PROGRESS NOTES
1450: TRANSFER - IN REPORT:    Verbal report received from Jonathan Newell RN (name) on Emogene Child  being received from ED (unit) for routine progression of care      Report consisted of patients Situation, Background, Assessment and   Recommendations(SBAR). Information from the following report(s) SBAR and Kardex was reviewed with the receiving nurse. Opportunity for questions and clarification was provided. Assessment completed upon patients arrival to unit and care assumed. 1602: Patient's . Attempting to page Dr. Mildred Albright office, however on phone with answering service for 20 minutes due to answering service being unable to reach after hours office. 1634: Dr. Phil Girard on call and paged, awaiting call back. 1636: Told to give 15 units of humalog.

## 2021-12-11 NOTE — ED NOTES
Patient arrived via EMS cc difficulty breathing, EMS states that she was 88% on room air when they arrived. They administered 2 L O2 via [place] and sats went up to 96%. Patient states she has Interstitial Lung Disease and has an appt next week with a Pulmonologist to get O2 home therapy. She has a cough, at times she states it is productive. She is A&Ox4 and has a slight temp at 99.9. She denies having fever or chills at home.

## 2021-12-11 NOTE — ED PROVIDER NOTES
EMERGENCY DEPARTMENT HISTORY AND PHYSICAL EXAM      Date: 12/11/2021  Patient Name: Rafael Dunbar    History of Presenting Illness     Chief Complaint   Patient presents with    Shortness of Breath       History Provided By: Patient    HPI: Rafael Dunbar, 78 y.o. female presents to the ED with cc of breath. Patient does have a past medical history significant for interstitial lung disease is followed by Dr. Inga Ricardo. She has not been on oxygen at home however. She states that she has been seen a few weeks ago and had done a steroid course however she never saw any significant improvement and over the last 2 days her shortness of breath is worsened. She states she is moderately short of breath at rest and gets severe with any exertion. She has had a dry cough. She denies any fever or chills. She denies any chest pain. She denies any abdominal pain, nausea, vomiting or diarrhea. She states overall she feels generally fatigued and weak. She denies any pain or swelling in the lower extremities. There are no other complaints, changes, or physical findings at this time. PCP: Rossi Pineda MD    No current facility-administered medications on file prior to encounter. Current Outpatient Medications on File Prior to Encounter   Medication Sig Dispense Refill    glimepiride (AMARYL) 4 mg tablet TAKE 1 TABLET TWICE A DAY (DOSE/DIRECTIONS CHANGED) 180 Tablet 3    insulin glargine (Lantus Solostar U-100 Insulin) 100 unit/mL (3 mL) inpn INJECT 30 UNITS DAILY 10 Pen 3    rosuvastatin (CRESTOR) 20 mg tablet Take 1 Tablet by mouth nightly.  90 Tablet 3    buPROPion XL (WELLBUTRIN XL) 150 mg tablet TAKE 1 TABLET DAILY 90 Tablet 3    benzonatate (TESSALON) 200 mg capsule TAKE 1 CAPSULE BY MOUTH THREE TIMES DAILY AS NEEDED FOR COUGH - SWALLOW CAPSULE WHOLE (DO NOT CRUSH) 60 Capsule 0    predniSONE (DELTASONE) 20 mg tablet Take one tablet daily 90 Tablet 3    Januvia 100 mg tablet TAKE 1 TABLET DAILY 90 Tablet 3    sertraline (ZOLOFT) 100 mg tablet TAKE 1 TABLET DAILY 90 Tablet 3    carvediloL (COREG) 6.25 mg tablet TAKE 1 TABLET TWICE A  Tablet 3    BD Ultra-Fine Short Pen Needle 31 gauge x 5/16\" ndle USE UNDER THE SKIN DAILY. USE WITH LANTUS FLEX PEN DAILY 1 Package 3    fenofibrate nanocrystallized (TRICOR) 145 mg tablet Take 1 Tablet by mouth daily. 90 Tablet 3    insulin glargine (LANTUS,BASAGLAR) 100 unit/mL (3 mL) inpn Take 30 units daily 30 Units 0    metFORMIN ER (GLUCOPHAGE XR) 500 mg tablet Take two tablet daily with dinner. 60 Tab 0    hydroCHLOROthiazide (HYDRODIURIL) 25 mg tablet TAKE 1 TABLET DAILY FOR FLUID AND BLOOD PRESSURE 90 Tab 3    glucose blood VI test strips (True Metrix Glucose Test Strip) strip USE ONCE DAILY AND RECORD RESULTS IN A NOTEBOOK ALSO RECORD LAST MEALTIME IN NOTEBOOK 100 Strip 3    clemastine 2.68 mg tab tablet Take 2.68 mg by mouth two (2) times a day.  doxycycline (VIBRAMYCIN) 100 mg capsule TAKE 1 (ONE) CAPSULE BY MOUTH TWICE DAILY WITH FOOD      oxazepam (SERAX) 15 mg capsule TAKE 2 CAPSULES BY MOUTH NIGHTLY AT BEDTIME 180 Cap 1    mupirocin (BACTROBAN) 2 % ointment Apply  to affected area three (3) times daily. 22 g 1    clindamycin (CLEOCIN) 300 mg capsule Take 2 capsules 1 hour before dental procedure 10 Cap 0    pantoprazole (PROTONIX) 40 mg tablet Take 40 mg by mouth daily.  montelukast sodium (SINGULAIR PO) Take 10 mg by mouth.  10 mg tab         Past History     Past Medical History:  Past Medical History:   Diagnosis Date    Abnormal LFTs 08/24/2017    FATTY LIVER    Arthritis     OSTEO    Avascular necrosis of hip ([facility] [address] 08/24/2017    BILAT HIPS    Breast CA ([facility] [address] 1989, 2001    BILATERAL; SURGERY, CHEMO    Chronic pain     CKD (chronic kidney disease), stage II 8/24/2017    Coagulation disorder ([facility] [address] 1984    ITP  (DR CHU BRADSHAW) - PLATELETS DROPPED TO 5K    Depression     Diabetes ([facility] [address]     TYPE 2; NIDDM    DJD (degenerative joint disease), multiple sites 8/24/2017    GI bleed 2008    HEMORRHOIDS    Hyperlipemia     Hypertension with renal disease 8/24/2017    IBS (irritable bowel syndrome) 8/24/2017    Ill-defined condition 2015    PNEUMONIA X2 - HOSPITALIZED IN 2015    Interstitial lung disease ([facility] [address] 04/01/2019    Liver disease     FATTY LIVER    Myalgia 8/24/2017    On statin therapy 8/24/2017    Overactive bladder 8/24/2017    Pneumonia 04/2015    HOSPITALIZED 3 WEEKS.     Polymyalgia rheumatica ([facility] [address] 8/24/2017    Prophylactic antibiotic 8/24/2017    Reflex abnormality     acid reflex    Rosacea        Past Surgical History:  Past Surgical History:   Procedure Laterality Date    HX APPENDECTOMY  1950    HX BREAST BIOPSY Bilateral     HX CATARACT REMOVAL Bilateral     HX COLONOSCOPY      HX ENDOSCOPY      HX GI      COLONOSCOPY    HX HEENT      WISDOM TEETH    HX HYSTERECTOMY  2000S    HX MASTECTOMY Right 1989    HX MASTECTOMY Left 2001    HX ORTHOPAEDIC  2008    LUMBAR FUSION    HX ORTHOPAEDIC  2018    RE-DO LUMBAR FUSION, AND ADDED THORACIC FUSION    HX ORTHOPAEDIC  03/26/2019    neckectomy    HX TUBAL LIGATION  1981    HX UROLOGICAL  2000s    BLADDER SLING    TN BREAST SURGERY PROCEDURE UNLISTED  1993, 2002    RECONSTRUCTION X2    TN TOTAL HIP ARTHROPLASTY Left 11/16/2016    ANTERIOR APPROACH, DR Gwendolyn De La Torre; (POSTOP: STANDS ONE INCH TALLER ON LEFT FOOT)    TN TOTAL HIP ARTHROPLASTY Right 12/2016    ANTERIOR APPROACH (DR Gwendolyn De La Torre)       Family History:  Family History   Problem Relation Age of Onset    Heart Disease Mother     Kidney Disease Mother     Lung Disease Mother         COPD    Diabetes Brother     Pacemaker Brother     Kidney Disease Brother     Hypertension Brother     Anesth Problems Neg Hx        Social History:  Social History     Tobacco Use    Smoking status: Never Smoker    Smokeless tobacco: Never Used   Vaping Use    Vaping Use: Never used   Substance Use Topics    Alcohol use: No    Drug use: No       Allergies: Allergies   Allergen Reactions    Metformin Diarrhea    Statins-Hmg-Coa Reductase Inhibitors Myalgia     Myalgia with  multiple statins  Takes pravachol     Codeine Rash     Rash on thighs    Darvon [Propoxyphene] Other (comments)     \"I see worms\"    Pcn [Penicillins] Rash    Sulfa (Sulfonamide Antibiotics) Rash         Review of Systems   Review of Systems   Constitutional: Positive for fatigue. Negative for appetite change, chills and fever. HENT: Negative. Negative for congestion, rhinorrhea, sinus pressure and sore throat. Eyes: Negative. Respiratory: Positive for cough, shortness of breath and wheezing. Negative for choking and chest tightness. Cardiovascular: Negative. Negative for chest pain, palpitations and leg swelling. Gastrointestinal: Negative for abdominal pain, constipation, diarrhea, nausea and vomiting. Endocrine: Negative. Genitourinary: Negative. Negative for difficulty urinating, dysuria, flank pain and urgency. Musculoskeletal: Negative. Skin: Negative. Neurological: Negative. Negative for dizziness, speech difficulty, weakness, light-headedness, numbness and headaches. Psychiatric/Behavioral: Negative. All other systems reviewed and are negative. Physical Exam   Physical Exam  Vitals and nursing note reviewed. Constitutional:       General: She is in acute distress. Appearance: She is well-developed. She is obese. She is ill-appearing. She is not diaphoretic. HENT:      Head: Normocephalic and atraumatic. Mouth/Throat:      Mouth: Mucous membranes are moist.      Pharynx: No oropharyngeal exudate. Eyes:      Extraocular Movements: Extraocular movements intact. Conjunctiva/sclera: Conjunctivae normal.      Pupils: Pupils are equal, round, and reactive to light. Neck:      Vascular: No JVD. Trachea: No tracheal deviation. Cardiovascular:      Rate and Rhythm: Regular rhythm. Tachycardia present. Heart sounds: Normal heart sounds. No murmur heard. Pulmonary:      Effort: Pulmonary effort is normal. No respiratory distress. Breath sounds: No stridor. Examination of the right-lower field reveals decreased breath sounds. Examination of the left-lower field reveals decreased breath sounds. Decreased breath sounds and rhonchi present. No wheezing or rales. Abdominal:      General: There is no distension. Palpations: Abdomen is soft. Tenderness: There is no abdominal tenderness. There is no guarding or rebound. Musculoskeletal:         General: No tenderness. Normal range of motion. Cervical back: Normal range of motion and neck supple. Right lower leg: No edema. Left lower leg: No edema. Skin:     General: Skin is warm and dry. Capillary Refill: Capillary refill takes less than 2 seconds. Neurological:      Mental Status: She is alert and oriented to person, place, and time. Cranial Nerves: No cranial nerve deficit. Comments: No gross motor or sensory deficits    Psychiatric:         Mood and Affect: Mood normal.         Behavior: Behavior normal.         Diagnostic Study Results     Labs -     Recent Results (from the past 12 hour(s))   CBC WITH AUTOMATED DIFF    Collection Time: 12/11/21  4:40 AM   Result Value Ref Range    WBC 8.6 3.6 - 11.0 K/uL    RBC 4.65 3.80 - 5.20 M/uL    HGB 15.3 11.5 - 16.0 g/dL    HCT 44.6 35.0 - 47.0 %    MCV 95.9 80.0 - 99.0 FL    MCH 32.9 26.0 - 34.0 PG    MCHC 34.3 30.0 - 36.5 g/dL    RDW 13.0 11.5 - 14.5 %    PLATELET 165 439 - 155 K/uL    MPV 9.9 8.9 - 12.9 FL    NRBC 0.0 0  WBC    ABSOLUTE NRBC 0.00 0.00 - 0.01 K/uL    NEUTROPHILS 89 (H) 32 - 75 %    LYMPHOCYTES 5 (L) 12 - 49 %    MONOCYTES 4 (L) 5 - 13 %    EOSINOPHILS 0 0 - 7 %    BASOPHILS 1 0 - 1 %    IMMATURE GRANULOCYTES 1 (H) 0.0 - 0.5 %    ABS. NEUTROPHILS 7.7 1.8 - 8.0 K/UL    ABS. LYMPHOCYTES 0.4 (L) 0.8 - 3.5 K/UL    ABS. MONOCYTES 0.3 0.0 - 1.0 K/UL    ABS. EOSINOPHILS 0.0 0.0 - 0.4 K/UL    ABS. BASOPHILS 0.1 0.0 - 0.1 K/UL    ABS. IMM. GRANS. 0.1 (H) 0.00 - 0.04 K/UL    DF SMEAR SCANNED      RBC COMMENTS NORMOCYTIC, NORMOCHROMIC     METABOLIC PANEL, COMPREHENSIVE    Collection Time: 12/11/21  4:40 AM   Result Value Ref Range    Sodium 134 (L) 136 - 145 mmol/L    Potassium 3.4 (L) 3.5 - 5.1 mmol/L    Chloride 100 97 - 108 mmol/L    CO2 26 21 - 32 mmol/L    Anion gap 8 5 - 15 mmol/L    Glucose 341 (H) 65 - 100 mg/dL    BUN 17 6 - 20 MG/DL    Creatinine 0.98 0.55 - 1.02 MG/DL    BUN/Creatinine ratio 17 12 - 20      GFR est AA >60 >60 ml/min/1.73m2    GFR est non-AA 55 (L) >60 ml/min/1.73m2    Calcium 9.8 8.5 - 10.1 MG/DL    Bilirubin, total 0.8 0.2 - 1.0 MG/DL    ALT (SGPT) 65 12 - 78 U/L    AST (SGOT) 38 (H) 15 - 37 U/L    Alk. phosphatase 111 45 - 117 U/L    Protein, total 7.4 6.4 - 8.2 g/dL    Albumin 3.0 (L) 3.5 - 5.0 g/dL    Globulin 4.4 (H) 2.0 - 4.0 g/dL    A-G Ratio 0.7 (L) 1.1 - 2.2     PROTHROMBIN TIME + INR    Collection Time: 12/11/21  4:40 AM   Result Value Ref Range    INR 1.1 0.9 - 1.1      Prothrombin time 11.3 (H) 9.0 - 11.1 sec   TROPONIN-HIGH SENSITIVITY    Collection Time: 12/11/21  4:40 AM   Result Value Ref Range    Troponin-High Sensitivity 17 0 - 51 ng/L   PROCALCITONIN    Collection Time: 12/11/21  4:40 AM   Result Value Ref Range    Procalcitonin 0.12 ng/mL   MAGNESIUM    Collection Time: 12/11/21  4:40 AM   Result Value Ref Range    Magnesium 2.0 1.6 - 2.4 mg/dL   SAMPLES BEING HELD    Collection Time: 12/11/21  4:40 AM   Result Value Ref Range    SAMPLES BEING HELD PST     COMMENT        Add-on orders for these samples will be processed based on acceptable specimen integrity and analyte stability, which may vary by analyte.    POC LACTIC ACID    Collection Time: 12/11/21  5:05 AM   Result Value Ref Range    Lactic Acid (POC) 1.62 0.40 - 2.00 mmol/L   COVID-19 RAPID TEST    Collection Time: 12/11/21  6:41 AM Result Value Ref Range    Specimen source Nasopharyngeal      COVID-19 rapid test Not detected NOTD         Radiologic Studies -   CTA CHEST W OR W WO CONT   Final Result   1. No pulmonary embolism. 2.  Widespread patchy groundglass opacities and interlobular septal thickening   may reflect multifocal atypical infection, inflammatory process, and/or edema. XR CHEST PA LAT   Final Result   No change. No acute findings. CT Results  (Last 48 hours)    None        CXR Results  (Last 48 hours)               12/11/21 0432  XR CHEST PA LAT Final result    Impression:  No change. No acute findings. Narrative:  History: Chest pain. 2 views of the chest demonstrate unremarkable cardiomediastinal contours. There   is scarring in the lungs bilaterally. Clips overlie the right hemidiaphragm and   there is evidence of prior spinal surgery. There are no effusions. There are   degenerative changes of the spine. Medical Decision Making   I am the first provider for this patient. I reviewed the vital signs, available nursing notes, past medical history, past surgical history, family history and social history. Vital Signs-Reviewed the patient's vital signs. Patient Vitals for the past 12 hrs:   Temp Pulse Resp BP SpO2   12/11/21 0330     97 %   12/11/21 0319 99.9 °F (37.7 °C) (!) 113 24 (!) 140/86 96 %   12/11/21 0306 99.8 °F (37.7 °C) (!) 115 23 (!) 153/88 97 %       EKG interpretation: (Preliminary)  Sinus tach, left axis, RBBB, inf q waves, no acute St changes, Roseanna Cici, DO    Records Reviewed: Nursing Notes, Old Medical Records, Previous electrocardiograms, Ambulance Run Sheet, Previous Radiology Studies and Previous Laboratory Studies   Followed by Dr. Bridger Lester as well as Daniela Pa for ILD Pt placed on prednisone by Dr. Daniela Encinas, was to have CT Chest w/o, which had been ordered but not scheduled.      Provider Notes (Medical Decision Making):   DDx- ILD exacerbation, COVID, pneumonia, bronchitis       ED Course:   Initial assessment performed. The patients presenting problems have been discussed, and they are in agreement with the care plan formulated and outlined with them. I have encouraged them to ask questions as they arise throughout their visit. Pt placed on 2L O2 by NC. No wheezing noted here in the ED. Chest x-ray unremarkable for any acute process will order a CT of the chest to rule out PE as well as that the Dr. Phil Sims had planned to order a CT of the chest at his last visit with him    Consulr Dr. Jaimee Plascencia , pt will be seen and admitted once she is back from CT. Thus far pt had not in evaluated and admitted. Given Aflutter. . will order s dose of [id number]    Disposition:  Admit      Diagnosis     Clinical Impression:   1. Acute respiratory failure with hypoxia ([address]    2. ILD (interstitial lung disease) ([address]        Attestations:    Willian Saavedra, DO    Please note that this dictation was completed with youwho, the computer voice recognition software. Quite often unanticipated grammatical, syntax, homophones, and other interpretive errors are inadvertently transcribed by the computer software. Please disregard these errors. Please excuse any errors that have escaped final proofreading. Thank you.

## 2021-12-11 NOTE — CONSULTS
Pulmonary, Critical Care, and Sleep Medicine    Initial Patient Consult    Name: Stefanie Ward MRN: [id number]   : 1945 Hospital: [facility]   Date: 2021          IMPRESSION  1. No pulmonary embolism. 2.  Widespread patchy groundglass opacities and interlobular septal thickening  may reflect multifocal atypical infection, inflammatory process, and/or edema. IMPRESSION:   · ILD, total lung capacity was 2.63 L which is 57% predicted on 2021. Appears to be more fibrotic disease on most recent CT scan of the chest on patient also is noted to have what appears to be worsening inflammation versus an atypical infection or inflammatory process. · Acute Hypoxic respiratory Failure. · 6  Minute walk test on 21: Walked 147 meter. No desaturation. Pox was 94% after walking. · Scoliosis  · S/p Covid vaccine back in March. · T2DM with hyperglycemia, will have to monitor with use of steroids. · RLS  · Moderately Severe ANGELINE. RECOMMENDATIONS:   · At this point would agree with IV steroids to see if we can have any reversibility or improvement in her symptoms. · Oxygen to keep her pulse oximetry over 90%  · Airway clearance therapies  · Monitor her glucose very closely on the steroids  · Continue the current Abx therapy. Subjective: This patient has been seen and evaluated at the request of Dr. Charlene Cárdenas for above. Patient is a 78 y.o. female Who is well known to me. She reports that she has had worsening cough for the last 1 week. She has tried increased dose of prednisone as an outpatient  (30mg) which did not improve her symptoms. Due to her severe fatigue and dyspnea she's had a decreased appetite for last 3 days. She's had ongoing issues with dysphaaia she felt like this may be due to her weakness, has been avoiding foods. Lives by her self no sick contacts.  She felt like she had oral herpes of her lips, mouth earlier this week, Seems improved with acyclovir. Past Medical History:   Diagnosis Date    Abnormal LFTs 08/24/2017    FATTY LIVER    Arthritis     OSTEO    Avascular necrosis of hip ([facility] [address] 08/24/2017    BILAT HIPS    Breast CA ([facility] 1989, 2001    BILATERAL; SURGERY, CHEMO    Chronic pain     CKD (chronic kidney disease), stage II 8/24/2017    Coagulation disorder ([facility] [address] 1984    ITP  (DR CHU BRADSHAW) - PLATELETS DROPPED TO 5K    Depression     Diabetes ([facility] [address]     TYPE 2; NIDDM    DJD (degenerative joint disease), multiple sites 8/24/2017    GI bleed 2008    HEMORRHOIDS    Hyperlipemia     Hypertension with renal disease 8/24/2017    IBS (irritable bowel syndrome) 8/24/2017    Ill-defined condition 2015    PNEUMONIA X2 - HOSPITALIZED IN 2015    Interstitial lung disease ([facility] [address] 04/01/2019    Liver disease     FATTY LIVER    Myalgia 8/24/2017    On statin therapy 8/24/2017    Overactive bladder 8/24/2017    Pneumonia 04/2015    HOSPITALIZED 3 WEEKS.     Polymyalgia rheumatica ([facility] [address] 8/24/2017    Prophylactic antibiotic 8/24/2017    Reflex abnormality     acid reflex    Rosacea       Past Surgical History:   Procedure Laterality Date    HX APPENDECTOMY  1950    HX BREAST BIOPSY Bilateral     HX CATARACT REMOVAL Bilateral     HX COLONOSCOPY      HX ENDOSCOPY      HX GI      COLONOSCOPY    HX HEENT      WISDOM TEETH    HX HYSTERECTOMY  2000S    HX MASTECTOMY Right 1989    HX MASTECTOMY Left 2001    HX ORTHOPAEDIC  2008    LUMBAR FUSION    HX ORTHOPAEDIC  2018    RE-DO LUMBAR FUSION, AND ADDED THORACIC FUSION    HX ORTHOPAEDIC  03/26/2019    neckectomy    HX TUBAL LIGATION  1981    HX UROLOGICAL  2000s    BLADDER SLING    AL BREAST SURGERY PROCEDURE UNLISTED  1993, 2002    RECONSTRUCTION X2    AL TOTAL HIP ARTHROPLASTY Left 11/16/2016    ANTERIOR APPROACH, DR Gwendolyn De La Torre; (POSTOP: STANDS ONE INCH TALLER ON LEFT FOOT)    AL TOTAL HIP ARTHROPLASTY Right 12/2016    ANTERIOR APPROACH (DR Gwendolyn De La Torre)      Prior to Admission medications    Medication Sig Start Date End Date Taking? Authorizing Provider   glimepiride (AMARYL) 4 mg tablet TAKE 1 TABLET TWICE A DAY (DOSE/DIRECTIONS CHANGED) 11/9/21   Madi Johnson MD   insulin glargine (Lantus Solostar U-100 Insulin) 100 unit/mL (3 mL) inpn INJECT 30 UNITS DAILY 10/26/21   Madi Johnson MD   rosuvastatin (CRESTOR) 20 mg tablet Take 1 Tablet by mouth nightly. 10/21/21   Madi Johnson MD   buPROPion XL (WELLBUTRIN XL) 150 mg tablet TAKE 1 TABLET DAILY 9/14/21   Madi Johnson MD   benzonatate (TESSALON) 200 mg capsule TAKE 1 CAPSULE BY MOUTH THREE TIMES DAILY AS NEEDED FOR COUGH - SWALLOW CAPSULE WHOLE (DO NOT CRUSH) 8/30/21   Madi Johnson MD   predniSONE (DELTASONE) 20 mg tablet Take one tablet daily 8/6/21   Madi Johnson MD   Januvia 100 mg tablet TAKE 1 TABLET DAILY 8/2/21   Maid Johnson MD   sertraline (ZOLOFT) 100 mg tablet TAKE 1 TABLET DAILY 8/2/21   Madi Johnson MD   carvediloL (COREG) 6.25 mg tablet TAKE 1 TABLET TWICE A DAY 7/19/21   Madi Johnson MD   BD Ultra-Fine Short Pen Needle 31 gauge x 5/16\" ndle USE UNDER THE SKIN DAILY. USE WITH LANTUS FLEX PEN DAILY 7/13/21   Madi Johnson MD   fenofibrate nanocrystallized (TRICOR) 145 mg tablet Take 1 Tablet by mouth daily. 6/14/21   Madi Johnson MD   insulin glargine (LANTUS,BASAGLAR) 100 unit/mL (3 mL) inpn Take 30 units daily 4/13/21   Madi Johnson MD   metFORMIN ER (GLUCOPHAGE XR) 500 mg tablet Take two tablet daily with dinner. 4/13/21   Madi Johnson MD   hydroCHLOROthiazide (HYDRODIURIL) 25 mg tablet TAKE 1 TABLET DAILY FOR FLUID AND BLOOD PRESSURE 4/5/21   Madi Johnson MD   glucose blood VI test strips (True Metrix Glucose Test Strip) strip USE ONCE DAILY AND RECORD RESULTS IN A NOTEBOOK ALSO RECORD LAST MEALTIME IN NOTEBOOK 3/23/21   Madi Johnson MD   clemastine 2.68 mg tab tablet Take 2.68 mg by mouth two (2) times a day. Provider, Historical   doxycycline (VIBRAMYCIN) 100 mg capsule TAKE 1 (ONE) CAPSULE BY MOUTH TWICE DAILY WITH FOOD 8/27/20   Provider, Historical   oxazepam (SERAX) 15 mg capsule TAKE 2 CAPSULES BY MOUTH NIGHTLY AT BEDTIME 8/13/20   Shanti Delacruz MD   pirocin [facility]) 2 % ointment Apply  to affected area three (3) times daily. 8/13/20   Shanti Delacruz MD   clindamycin (CLEOCIN) 300 mg capsule Take 2 capsules 1 hour before dental procedure 8/13/20   Shanti Delacruz MD   pantoprazole (PROTONIX) 40 mg tablet Take 40 mg by mouth daily. 11/21/19   Provider, Historical   montelukast sodium (SINGULAIR PO) Take 10 mg by mouth.  10 mg tab    Provider, Historical     Allergies   Allergen Reactions    Metformin Diarrhea    Statins-Hmg-Coa Reductase Inhibitors Myalgia     Myalgia with  multiple statins  Takes pravachol     Codeine Rash     Rash on thighs    Darvon [Propoxyphene] Other (comments)     \"I see worms\"    Pcn [Penicillins] Rash    Sulfa (Sulfonamide Antibiotics) Rash      Social History     Tobacco Use    Smoking status: Never Smoker    Smokeless tobacco: Never Used   Substance Use Topics    Alcohol use: No      Family History   Problem Relation Age of Onset    Heart Disease Mother     Kidney Disease Mother     Lung Disease Mother         COPD    Diabetes Brother     Pacemaker Brother     Kidney Disease Brother     Hypertension Brother     Anesth Problems Neg Hx         Current Facility-Administered Medications   Medication Dose Route Frequency    buPROPion XL (WELLBUTRIN XL) tablet 150 mg  150 mg Oral DAILY    carvediloL (COREG) tablet 6.25 mg  6.25 mg Oral BID WITH MEALS    fenofibrate nanocrystallized (TRICOR) tablet 145 mg  145 mg Oral DAILY    glimepiride (AMARYL) tablet 4 mg  4 mg Oral ACB&D    hydroCHLOROthiazide (HYDRODIURIL) tablet 25 mg  25 mg Oral DAILY    insulin glargine (LANTUS) injection 30 Units  30 Units SubCUTAneous DAILY    pantoprazole (PROTONIX) tablet 40 mg  40 mg Oral DAILY    rosuvastatin (CRESTOR) tablet 20 mg  20 mg Oral QHS    sertraline (ZOLOFT) tablet 100 mg  100 mg Oral DAILY    sodium chloride (NS) flush 5-40 mL  5-40 mL IntraVENous Q8H    enoxaparin (LOVENOX) injection 40 mg  40 mg SubCUTAneous DAILY    methylPREDNISolone (PF) (SOLU-MEDROL) injection 60 mg  60 mg IntraVENous Q8H    insulin lispro (HUMALOG) injection   SubCUTAneous AC&HS    insulin NPH (NOVOLIN N, HUMULIN N) injection 10 Units  10 Units SubCUTAneous Q8H    azithromycin (ZITHROMAX) tablet 500 mg  500 mg Oral DAILY    montelukast (SINGULAIR) tablet 10 mg  10 mg Oral QHS    albuterol-ipratropium (DUO-NEB) 2.5 MG-0.5 MG/3 ML  3 mL Nebulization Q6H RT       Review of Systems:  Constitutional: positive for fevers, chills and fatigue  Eyes: negative  Ears, nose, mouth, throat, and face: positive for nasal congestion  Respiratory: positive for cough, sputum, hemoptysis, dyspnea on exertion or chronic bronchitis  Cardiovascular: negative  Gastrointestinal: positive for dysphagia  Genitourinary:negative  Integument/breast: negative  Hematologic/lymphatic: negative  Musculoskeletal:positive for myalgias  Neurological: negative  Behavioral/Psych: negative  Endocrine: negative  Allergic/Immunologic: positive for hay fever    Objective:   Vital Signs:    Visit Vitals  BP (!) 140/86 (BP 1 Location: Right upper arm, BP Patient Position: At rest;Lying)   Pulse (!) 113   Temp 99.9 °F (37.7 °C)   Resp 24   Ht 5' 3\" (1.6 m)   Wt 90.4 kg (199 lb 4.7 oz)   SpO2 97%   BMI 35.30 kg/m²       O2 Device: Nasal cannula   O2 Flow Rate (L/min): 2 l/min   Temp (24hrs), Av.9 °F (37.7 °C), Min:99.8 °F (37.7 °C), Max:99.9 °F (37.7 °C)       Intake/Output:   Last shift:      No intake/output data recorded. Last 3 shifts: No intake/output data recorded. No intake or output data in the 24 hours ending 21 1404   Physical Exam:   General:  Alert, cooperative, no distress, appears stated age.  Lying in bed, seems very fatigued. On 2L NC with pox of 95%. Has an intermittent dry cough. Head:  Normocephalic, without obvious abnormality, atraumatic. Eyes:  Conjunctivae/corneas clear. PERRL, EOMs intact. Nose: Nares normal. Septum midline. Mucosa normal. No drainage or sinus tenderness. Throat: Lips, mucosa, and tongue normal. Teeth and gums normal.   Neck: Supple, symmetrical, trachea midline, no adenopathy, thyroid: no enlargment/tenderness/nodules, no carotid bruit and no JVD. Back:   Symmetric, no curvature. ROM normal.   Lungs:   Bilateral basilar rales. Comfortable. Chest wall:  No tenderness or deformity. Heart:  Regular rate and rhythm, S1, S2 normal, no murmur, click, rub or gallop. Abdomen:   Soft, non-tender. Bowel sounds normal. No masses,  No organomegaly. Extremities: Extremities normal, atraumatic, no cyanosis or edema. Pulses: 2+ and symmetric all extremities. Skin: Skin color, texture, turgor normal. No rashes or lesions   Lymph nodes: Cervical, supraclavicular, and axillary nodes normal.   Neurologic: Grossly nonfocal, motor seems to be intact. Data review:     Recent Results (from the past 24 hour(s))   CBC WITH AUTOMATED DIFF    Collection Time: 12/11/21  4:40 AM   Result Value Ref Range    WBC 8.6 3.6 - 11.0 K/uL    RBC 4.65 3.80 - 5.20 M/uL    HGB 15.3 11.5 - 16.0 g/dL    HCT 44.6 35.0 - 47.0 %    MCV 95.9 80.0 - 99.0 FL    MCH 32.9 26.0 - 34.0 PG    MCHC 34.3 30.0 - 36.5 g/dL    RDW 13.0 11.5 - 14.5 %    PLATELET 829 430 - 556 K/uL    MPV 9.9 8.9 - 12.9 FL    NRBC 0.0 0  WBC    ABSOLUTE NRBC 0.00 0.00 - 0.01 K/uL    NEUTROPHILS 89 (H) 32 - 75 %    LYMPHOCYTES 5 (L) 12 - 49 %    MONOCYTES 4 (L) 5 - 13 %    EOSINOPHILS 0 0 - 7 %    BASOPHILS 1 0 - 1 %    IMMATURE GRANULOCYTES 1 (H) 0.0 - 0.5 %    ABS. NEUTROPHILS 7.7 1.8 - 8.0 K/UL    ABS. LYMPHOCYTES 0.4 (L) 0.8 - 3.5 K/UL    ABS. MONOCYTES 0.3 0.0 - 1.0 K/UL    ABS. EOSINOPHILS 0.0 0.0 - 0.4 K/UL    ABS. BASOPHILS 0.1 0.0 - 0.1 K/UL    ABS. IMM. GRANS. 0.1 (H) 0.00 - 0.04 K/UL    DF SMEAR SCANNED      RBC COMMENTS NORMOCYTIC, NORMOCHROMIC     METABOLIC PANEL, COMPREHENSIVE    Collection Time: 12/11/21  4:40 AM   Result Value Ref Range    Sodium 134 (L) 136 - 145 mmol/L    Potassium 3.4 (L) 3.5 - 5.1 mmol/L    Chloride 100 97 - 108 mmol/L    CO2 26 21 - 32 mmol/L    Anion gap 8 5 - 15 mmol/L    Glucose 341 (H) 65 - 100 mg/dL    BUN 17 6 - 20 MG/DL    Creatinine 0.98 0.55 - 1.02 MG/DL    BUN/Creatinine ratio 17 12 - 20      GFR est AA >60 >60 ml/min/1.73m2    GFR est non-AA 55 (L) >60 ml/min/1.73m2    Calcium 9.8 8.5 - 10.1 MG/DL    Bilirubin, total 0.8 0.2 - 1.0 MG/DL    ALT (SGPT) 65 12 - 78 U/L    AST (SGOT) 38 (H) 15 - 37 U/L    Alk. phosphatase 111 45 - 117 U/L    Protein, total 7.4 6.4 - 8.2 g/dL    Albumin 3.0 (L) 3.5 - 5.0 g/dL    Globulin 4.4 (H) 2.0 - 4.0 g/dL    A-G Ratio 0.7 (L) 1.1 - 2.2     PROTHROMBIN TIME + INR    Collection Time: 12/11/21  4:40 AM   Result Value Ref Range    INR 1.1 0.9 - 1.1      Prothrombin time 11.3 (H) 9.0 - 11.1 sec   TROPONIN-HIGH SENSITIVITY    Collection Time: 12/11/21  4:40 AM   Result Value Ref Range    Troponin-High Sensitivity 17 0 - 51 ng/L   PROCALCITONIN    Collection Time: 12/11/21  4:40 AM   Result Value Ref Range    Procalcitonin 0.12 ng/mL   MAGNESIUM    Collection Time: 12/11/21  4:40 AM   Result Value Ref Range    Magnesium 2.0 1.6 - 2.4 mg/dL   SAMPLES BEING HELD    Collection Time: 12/11/21  4:40 AM   Result Value Ref Range    SAMPLES BEING HELD PST     COMMENT        Add-on orders for these samples will be processed based on acceptable specimen integrity and analyte stability, which may vary by analyte.    EKG, 12 LEAD, INITIAL    Collection Time: 12/11/21  4:52 AM   Result Value Ref Range    Ventricular Rate 106 BPM    Atrial Rate 106 BPM    P-R Interval 170 ms    QRS Duration 126 ms    Q-T Interval 372 ms    QTC Calculation (Bezet) 494 ms    Calculated P Axis 13 degrees    Calculated R Axis -61 degrees    Calculated T Axis 10 degrees    Diagnosis       Sinus tachycardia  Left axis deviation  Right bundle branch block  Inferior infarct (cited on or before 11-DEC-2021)  When compared with ECG of 30-APR-2019 18:06,  Right bundle branch block is now present  Confirmed by Juan Antonio Johnson ([id number]) on 12/11/2021 8:56:01 AM     CULTURE, BLOOD, PAIRED    Collection Time: 12/11/21  4:55 AM    Specimen: Blood   Result Value Ref Range    Special Requests: NO SPECIAL REQUESTS      Culture result: NO GROWTH AFTER 3 HOURS     POC LACTIC ACID    Collection Time: 12/11/21  5:05 AM   Result Value Ref Range    Lactic Acid (POC) 1.62 0.40 - 2.00 mmol/L   COVID-19 RAPID TEST    Collection Time: 12/11/21  6:41 AM   Result Value Ref Range    Specimen source Nasopharyngeal      COVID-19 rapid test Not detected NOTD     GLUCOSE, POC    Collection Time: 12/11/21  8:41 AM   Result Value Ref Range    Glucose (POC) 470 (H) 65 - 117 mg/dL    Performed by Rosy SEN (EDT)    GLUCOSE, POC    Collection Time: 12/11/21 12:52 PM   Result Value Ref Range    Glucose (POC) 364 (H) 65 - 117 mg/dL    Performed by Rosy SEN (EDT)        Imaging:  I have personally reviewed the patients radiographs and have reviewed the reports:  12/11/2021: FINDINGS: This is a good quality study for the evaluation of pulmonary embolism  to the first subsegmental arterial level. There is no pulmonary embolism to this  level.      THYROID: Left hemithyroidectomy. Stable 2.0 cm and 0.6 cm hypodense right  thyroid lobe nodules  CHEST WALL: Bilateral mastectomies. Chronically ruptured left breast implant. MEDIASTINUM: No mass or lymphadenopathy. MARIAN: No mass or lymphadenopathy. Several calcified right hilar lymph nodes. THORACIC AORTA: No aneurysm. Atherosclerosis. HEART: Normal in size. Scattered coronary artery calcifications. ESOPHAGUS: No wall thickening or dilatation. TRACHEA/BRONCHI: Patent.   PLEURA: No effusion or pneumothorax. LUNGS: Widespread subtle subpleural fibrotic changes. Widespread scattered  patchy groundglass opacities and interlobular septal thickening. UPPER ABDOMEN: Partially imaged. No acute pathology. BONES: No aggressive bone lesion or fracture. Partially visualized cervical and  thoracolumbar spine hardware. ? Renal osteodystrophy.     IMPRESSION  1. No pulmonary embolism. 2.  Widespread patchy groundglass opacities and interlobular septal thickening  may reflect multifocal atypical infection, inflammatory process, and/or edema.         Odilia Chavez MD

## 2021-12-11 NOTE — H&P
Hospitalist Admission Note    NAME: Grace Strickland   :     MRN:  [id number]     Date/Time:  2021 7:50 AM    Patient PCP: Espinoza Christianson MD primary pulmonologist= Dr. Denzel Mitchell  ______________________________________________________________________  Given the patient's current clinical presentation, I have a high level of concern for decompensation if discharged from the emergency department. Complex decision making was performed, which includes reviewing the patient's available past medical records, laboratory results, and x-ray films. My assessment of this patient's clinical condition and my plan of care is as follows.     Assessment / Plan:  Acute on chronic respiratory failure with hypoxia POA  Interstitial lung disease POA  Acute bronchitis POA  Hypokalemia POA  Chest x-ray bilateral lung scarring noted, negative acute  Rapid Covid test negative  Low-grade fever of 99.9  Potassium 3.4  Procalcitonin 0.12  High-sensitivity troponin 17    Admit to remote telemetry bed  Check CTA chest to rule out PE  Empiric IV Solu-Medrol as started in the ED  Empiric antibiotic azithromycin to cover bronchitis for now  Continue home inhalers  DuoNeb trial for now  Replenish potassium p.o. x1  Inpatient pulmonary consult with Dr. Kirk Bocanegra was supposed to see him next week after a CT scan for planning on her interstitial lung disease    Diabetes uncontrolled with hyperglycemia due to steroids POA-patient was on daily prednisone  Fingersticks before every meal and at bedtime  Sliding scale lispro here insulin resistance scale  Continue home glimepiride twice daily, continue home Lantus 30 units daily for now as we add NPH with steroids (patient takes Lantus 28 units twice daily at home)    Hypertension  Hyperlipidemia  Anxiety with depression  Continue all other home meds including psych meds          Code Status: DNR as per patient's wishes in the ED, states she has signed DNR paperwork with PCP  Surrogate Decision Maker: Son    DVT Prophylaxis: Subcu Lovenox  GI Prophylaxis: not indicated    Baseline: Patient lives alone, has , has been using cane/walker when needed, has not been moving around much lately      Subjective:   CHIEF COMPLAINT: Worsening shortness of breath for the past few days    HISTORY OF PRESENT ILLNESS:     Dixon Quinones is a 78 y.o.  female who presents with above complaint from home via EMS. Patient presented treatment of worsening fundus of breath for the past few days  EMS found her to have sats in mid 80s on room air, promptly improved with 2 L of oxygen via nasal cannula to 96%. History of associated cough with low-grade fever for the past couple of days  History of taking chronic prednisone for interstitial lung disease per Dr. Junaid gaytan for a visit next week follow-up on ILD after having a CT scan. Patient not on home oxygen until now. Patient is fully vaccinated but not has had a booster yet for COVID-19 infection  Patient had a rapid Covid test negative in ED with chest x-ray unremarkable for any acute findings except bilateral lung scarring which is chronic. We were asked to admit for work up and evaluation of the above problems.      Past Medical History:   Diagnosis Date    Abnormal LFTs 08/24/2017    FATTY LIVER    Arthritis     OSTEO    Avascular necrosis of hip ([address] 08/24/2017    BILAT HIPS    Breast CA ([address] 1989, 2001    BILATERAL; SURGERY, CHEMO    Chronic pain     CKD (chronic kidney disease), stage II 8/24/2017    Coagulation disorder ([address] 1984    ITP  (DR CHU BRADSHAW) - PLATELETS DROPPED TO 5K    Depression     Diabetes ([address]     TYPE 2; NIDDM    DJD (degenerative joint disease), multiple sites 8/24/2017    GI bleed 2008    HEMORRHOIDS    Hyperlipemia     Hypertension with renal disease 8/24/2017    IBS (irritable bowel syndrome) 8/24/2017    Ill-defined condition 2015    PNEUMONIA X2 - HOSPITALIZED IN 2015    Interstitial lung disease ([facility] 04/01/2019    Liver disease     FATTY LIVER    Myalgia 8/24/2017    On statin therapy 8/24/2017    Overactive bladder 8/24/2017    Pneumonia 04/2015    HOSPITALIZED 3 WEEKS.     Polymyalgia rheumatica ([facility] 8/24/2017    Prophylactic antibiotic 8/24/2017    Reflex abnormality     acid reflex    Rosacea         Past Surgical History:   Procedure Laterality Date    HX APPENDECTOMY  1950    HX BREAST BIOPSY Bilateral     HX CATARACT REMOVAL Bilateral     HX COLONOSCOPY      HX ENDOSCOPY      HX GI      COLONOSCOPY    HX HEENT      WISDOM TEETH    HX HYSTERECTOMY  2000S    HX MASTECTOMY Right 1989    HX MASTECTOMY Left 2001    HX ORTHOPAEDIC  2008    LUMBAR FUSION    HX ORTHOPAEDIC  2018    RE-DO LUMBAR FUSION, AND ADDED THORACIC FUSION    HX ORTHOPAEDIC  03/26/2019    neckectomy    HX TUBAL LIGATION  1981    HX UROLOGICAL  2000s    BLADDER SLING    MO BREAST SURGERY PROCEDURE UNLISTED  1993, 2002    RECONSTRUCTION X2    MO TOTAL HIP ARTHROPLASTY Left 11/16/2016    ANTERIOR APPROACH, DR Gwendolyn De La Torre; (POSTOP: STANDS ONE INCH TALLER ON LEFT FOOT)    MO TOTAL HIP ARTHROPLASTY Right 12/2016    ANTERIOR APPROACH (DR Gwendolyn De La Torre)       Social History     Tobacco Use    Smoking status: Never Smoker    Smokeless tobacco: Never Used   Substance Use Topics    Alcohol use: No        Family History   Problem Relation Age of Onset    Heart Disease Mother     Kidney Disease Mother     Lung Disease Mother         COPD    Diabetes Brother     Pacemaker Brother     Kidney Disease Brother     Hypertension Brother     Anesth Problems Neg Hx      Allergies   Allergen Reactions    Metformin Diarrhea    Statins-Hmg-Coa Reductase Inhibitors Myalgia     Myalgia with  multiple statins  Takes pravachol     Codeine Rash     Rash on thighs    Darvon [Propoxyphene] Other (comments)     \"I see worms\"    Pcn [Penicillins] Rash    Sulfa (Sulfonamide Antibiotics) Rash Prior to Admission medications    Medication Sig Start Date End Date Taking? Authorizing Provider   glimepiride (AMARYL) 4 mg tablet TAKE 1 TABLET TWICE A DAY (DOSE/DIRECTIONS CHANGED) 11/9/21   Larissa Gomez MD   insulin glargine (Lantus Solostar U-100 Insulin) 100 unit/mL (3 mL) inpn INJECT 30 UNITS DAILY 10/26/21   Larissa Gomez MD   rosuvastatin (CRESTOR) 20 mg tablet Take 1 Tablet by mouth nightly. 10/21/21   Larissa Gomez MD   buPROPion XL (WELLBUTRIN XL) 150 mg tablet TAKE 1 TABLET DAILY 9/14/21   Larissa Gomez MD   benzonatate (TESSALON) 200 mg capsule TAKE 1 CAPSULE BY MOUTH THREE TIMES DAILY AS NEEDED FOR COUGH - SWALLOW CAPSULE WHOLE (DO NOT CRUSH) 8/30/21   Larissa Gomez MD   predniSONE (DELTASONE) 20 mg tablet Take one tablet daily 8/6/21   Larissa Gomez MD   Januvia 100 mg tablet TAKE 1 TABLET DAILY 8/2/21   Larissa Gomez MD   sertraline (ZOLOFT) 100 mg tablet TAKE 1 TABLET DAILY 8/2/21   Larissa Gomez MD   carvediloL (COREG) 6.25 mg tablet TAKE 1 TABLET TWICE A DAY 7/19/21   Larissa Gomez MD   BD Ultra-Fine Short Pen Needle 31 gauge x 5/16\" ndle USE UNDER THE SKIN DAILY. USE WITH LANTUS FLEX PEN DAILY 7/13/21   Larissa Gomez MD   fenofibrate nanocrystallized (TRICOR) 145 mg tablet Take 1 Tablet by mouth daily. 6/14/21   Larissa Gomez MD   insulin glargine (LANTUS,BASAGLAR) 100 unit/mL (3 mL) inpn Take 30 units daily 4/13/21   Larissa Gomez MD   metFORMIN ER (GLUCOPHAGE XR) 500 mg tablet Take two tablet daily with dinner.  4/13/21   Larissa Gomez MD   hydroCHLOROthiazide (HYDRODIURIL) 25 mg tablet TAKE 1 TABLET DAILY FOR FLUID AND BLOOD PRESSURE 4/5/21   Larissa Gomez MD   glucose blood VI test strips (True Metrix Glucose Test Strip) strip USE ONCE DAILY AND RECORD RESULTS IN A NOTEBOOK ALSO RECORD LAST MEALTIME IN NOTEBOOK 3/23/21   Larissa Gomez MD   clemastine 2.68 mg tab tablet Take 2.68 mg by mouth two (2) times a day. Provider, Historical   doxycycline (VIBRAMYCIN) 100 mg capsule TAKE 1 (ONE) CAPSULE BY MOUTH TWICE DAILY WITH FOOD 8/27/20   Provider, Historical   oxazepam (SERAX) 15 mg capsule TAKE 2 CAPSULES BY MOUTH NIGHTLY AT BEDTIME 8/13/20   Chai Jean-Baptiste MD   mupirocin [facility]) 2 % ointment Apply  to affected area three (3) times daily. 8/13/20   Chai Jean-Baptiste MD   clindamycin (CLEOCIN) 300 mg capsule Take 2 capsules 1 hour before dental procedure 8/13/20   Chai Jean-Baptiste MD   pantoprazole (PROTONIX) 40 mg tablet Take 40 mg by mouth daily. 11/21/19   Provider, Historical   montelukast sodium (SINGULAIR PO) Take 10 mg by mouth.  10 mg tab    Provider, Historical       REVIEW OF SYSTEMS:         Total of 12 systems reviewed as follows:       POSITIVE= underlined text  Negative = text not underlined  General:  fever, chills, sweats, generalized weakness, weight loss/gain,      loss of appetite   Eyes:    blurred vision, eye pain, loss of vision, double vision  ENT:    rhinorrhea, pharyngitis   Respiratory:   cough, sputum production, SOB, NORMAN, wheezing, pleuritic pain   Cardiology:   chest pain, palpitations, orthopnea, PND, edema, syncope   Gastrointestinal:  abdominal pain , N/V, diarrhea, dysphagia, constipation, bleeding   Genitourinary:  frequency, urgency, dysuria, hematuria, incontinence   Muskuloskeletal :  arthralgia, myalgia, back pain  Hematology:  easy bruising, nose or gum bleeding, lymphadenopathy   Dermatological: rash, ulceration, pruritis, color change / jaundice  Endocrine:   hot flashes or polydipsia   Neurological:  headache, dizziness, confusion, focal weakness, paresthesia,     Speech difficulties, memory loss, gait difficulty  Psychological: Feelings of anxiety, depression, agitation    Objective:   VITALS:    Visit Vitals  BP (!) 140/86 (BP 1 Location: Right upper arm, BP Patient Position: At rest;Lying)   Pulse (!) 113   Temp 99.9 °F (37.7 °C)   Resp 24   Ht 5' 3\" (1.6 m)   Wt 90.4 kg (199 lb 4.7 oz)   SpO2 97%   BMI 35.30 kg/m²       PHYSICAL EXAM:    General:    Alert, cooperative, no distress, appears stated age. HEENT: Atraumatic, anicteric sclerae, pink conjunctivae     No oral ulcers, mucosa moist, throat clear, dentition fair  Neck:  Supple, symmetrical,  thyroid: non tender  Lungs:   Fine crackles noted all over+, minimal wheezing  Chest wall:  No tenderness  No Accessory muscle use. Heart:   Regular  rhythm,  No  murmur   No edema  Abdomen:   Soft, non-tender. Not distended. Bowel sounds normal  Extremities: No cyanosis. No clubbing,      Skin turgor normal, Capillary refill normal, Radial dial pulse 2+  Skin:     Not pale. Not Jaundiced  No rashes   Psych:  Good insight. Not depressed. Not anxious or agitated. Neurologic: EOMs intact. No facial asymmetry. No aphasia or slurred speech. Symmetrical strength, Sensation grossly intact. Alert and oriented X 4.     _______________________________________________________________________  Care Plan discussed with:    Comments   Patient x    Family      RN x    Care Manager                    Consultant:  marlyn Galvan   _______________________________________________________________________  Expected  Disposition:   Home with Family x   HH/PT/OT/RN ?   SNF/LTC    JOSE    ________________________________________________________________________  TOTAL TIME:  46 Minutes    Critical Care Provided     Minutes non procedure based      Comments    x Reviewed previous records   >50% of visit spent in counseling and coordination of care x Discussion with patient  and questions answered       ________________________________________________________________________  Signed: Keri Erickson MD    Procedures: see electronic medical records for all procedures/Xrays and details which were not copied into this note but were reviewed prior to creation of Plan.     LAB DATA REVIEWED:    Recent Results (from the past 24 hour(s)) CBC WITH AUTOMATED DIFF    Collection Time: 12/11/21  4:40 AM   Result Value Ref Range    WBC 8.6 3.6 - 11.0 K/uL    RBC 4.65 3.80 - 5.20 M/uL    HGB 15.3 11.5 - 16.0 g/dL    HCT 44.6 35.0 - 47.0 %    MCV 95.9 80.0 - 99.0 FL    MCH 32.9 26.0 - 34.0 PG    MCHC 34.3 30.0 - 36.5 g/dL    RDW 13.0 11.5 - 14.5 %    PLATELET 442 495 - 479 K/uL    MPV 9.9 8.9 - 12.9 FL    NRBC 0.0 0  WBC    ABSOLUTE NRBC 0.00 0.00 - 0.01 K/uL    NEUTROPHILS 89 (H) 32 - 75 %    LYMPHOCYTES 5 (L) 12 - 49 %    MONOCYTES 4 (L) 5 - 13 %    EOSINOPHILS 0 0 - 7 %    BASOPHILS 1 0 - 1 %    IMMATURE GRANULOCYTES 1 (H) 0.0 - 0.5 %    ABS. NEUTROPHILS 7.7 1.8 - 8.0 K/UL    ABS. LYMPHOCYTES 0.4 (L) 0.8 - 3.5 K/UL    ABS. MONOCYTES 0.3 0.0 - 1.0 K/UL    ABS. EOSINOPHILS 0.0 0.0 - 0.4 K/UL    ABS. BASOPHILS 0.1 0.0 - 0.1 K/UL    ABS. IMM. GRANS. 0.1 (H) 0.00 - 0.04 K/UL    DF SMEAR SCANNED      RBC COMMENTS NORMOCYTIC, NORMOCHROMIC     METABOLIC PANEL, COMPREHENSIVE    Collection Time: 12/11/21  4:40 AM   Result Value Ref Range    Sodium 134 (L) 136 - 145 mmol/L    Potassium 3.4 (L) 3.5 - 5.1 mmol/L    Chloride 100 97 - 108 mmol/L    CO2 26 21 - 32 mmol/L    Anion gap 8 5 - 15 mmol/L    Glucose 341 (H) 65 - 100 mg/dL    BUN 17 6 - 20 MG/DL    Creatinine 0.98 0.55 - 1.02 MG/DL    BUN/Creatinine ratio 17 12 - 20      GFR est AA >60 >60 ml/min/1.73m2    GFR est non-AA 55 (L) >60 ml/min/1.73m2    Calcium 9.8 8.5 - 10.1 MG/DL    Bilirubin, total 0.8 0.2 - 1.0 MG/DL    ALT (SGPT) 65 12 - 78 U/L    AST (SGOT) 38 (H) 15 - 37 U/L    Alk.  phosphatase 111 45 - 117 U/L    Protein, total 7.4 6.4 - 8.2 g/dL    Albumin 3.0 (L) 3.5 - 5.0 g/dL    Globulin 4.4 (H) 2.0 - 4.0 g/dL    A-G Ratio 0.7 (L) 1.1 - 2.2     PROTHROMBIN TIME + INR    Collection Time: 12/11/21  4:40 AM   Result Value Ref Range    INR 1.1 0.9 - 1.1      Prothrombin time 11.3 (H) 9.0 - 11.1 sec   TROPONIN-HIGH SENSITIVITY    Collection Time: 12/11/21  4:40 AM   Result Value Ref Range Troponin-High Sensitivity 17 0 - 51 ng/L   PROCALCITONIN    Collection Time: 12/11/21  4:40 AM   Result Value Ref Range    Procalcitonin 0.12 ng/mL   MAGNESIUM    Collection Time: 12/11/21  4:40 AM   Result Value Ref Range    Magnesium 2.0 1.6 - 2.4 mg/dL   SAMPLES BEING HELD    Collection Time: 12/11/21  4:40 AM   Result Value Ref Range    SAMPLES BEING HELD PST     COMMENT        Add-on orders for these samples will be processed based on acceptable specimen integrity and analyte stability, which may vary by analyte.    POC LACTIC ACID    Collection Time: 12/11/21  5:05 AM   Result Value Ref Range    Lactic Acid (POC) 1.62 0.40 - 2.00 mmol/L   COVID-19 RAPID TEST    Collection Time: 12/11/21  6:41 AM   Result Value Ref Range    Specimen source Nasopharyngeal      COVID-19 rapid test Not detected NOTD

## 2021-12-12 LAB
GLUCOSE BLD STRIP.AUTO-MCNC: 273 MG/DL (ref 65–117)
GLUCOSE BLD STRIP.AUTO-MCNC: 297 MG/DL (ref 65–117)
GLUCOSE BLD STRIP.AUTO-MCNC: 322 MG/DL (ref 65–117)
GLUCOSE BLD STRIP.AUTO-MCNC: 352 MG/DL (ref 65–117)
GLUCOSE BLD STRIP.AUTO-MCNC: 452 MG/DL (ref 65–117)
GLUCOSE BLD STRIP.AUTO-MCNC: 452 MG/DL (ref 65–117)
SERVICE CMNT-IMP: ABNORMAL

## 2021-12-12 PROCEDURE — 74011636637 HC RX REV CODE- 636/637: Performed by: NURSE PRACTITIONER

## 2021-12-12 PROCEDURE — 74011000250 HC RX REV CODE- 250: Performed by: INTERNAL MEDICINE

## 2021-12-12 PROCEDURE — 74011250637 HC RX REV CODE- 250/637: Performed by: INTERNAL MEDICINE

## 2021-12-12 PROCEDURE — 74011636637 HC RX REV CODE- 636/637: Performed by: FAMILY MEDICINE

## 2021-12-12 PROCEDURE — 74011250636 HC RX REV CODE- 250/636: Performed by: INTERNAL MEDICINE

## 2021-12-12 PROCEDURE — 74011636637 HC RX REV CODE- 636/637: Performed by: INTERNAL MEDICINE

## 2021-12-12 PROCEDURE — 82962 GLUCOSE BLOOD TEST: CPT

## 2021-12-12 PROCEDURE — 65660000000 HC RM CCU STEPDOWN

## 2021-12-12 PROCEDURE — 94640 AIRWAY INHALATION TREATMENT: CPT

## 2021-12-12 PROCEDURE — 77010033678 HC OXYGEN DAILY

## 2021-12-12 PROCEDURE — 74011250637 HC RX REV CODE- 250/637: Performed by: FAMILY MEDICINE

## 2021-12-12 PROCEDURE — 99232 SBSQ HOSP IP/OBS MODERATE 35: CPT | Performed by: FAMILY MEDICINE

## 2021-12-12 RX ORDER — INSULIN GLARGINE 100 [IU]/ML
70 INJECTION, SOLUTION SUBCUTANEOUS DAILY
Status: DISCONTINUED | OUTPATIENT
Start: 2021-12-13 | End: 2021-12-14

## 2021-12-12 RX ORDER — POTASSIUM CHLORIDE 750 MG/1
10 TABLET, FILM COATED, EXTENDED RELEASE ORAL 2 TIMES DAILY
Status: DISCONTINUED | OUTPATIENT
Start: 2021-12-12 | End: 2021-12-16 | Stop reason: HOSPADM

## 2021-12-12 RX ORDER — INSULIN LISPRO 100 [IU]/ML
6 INJECTION, SOLUTION INTRAVENOUS; SUBCUTANEOUS ONCE
Status: COMPLETED | OUTPATIENT
Start: 2021-12-12 | End: 2021-12-12

## 2021-12-12 RX ORDER — INSULIN GLARGINE 100 [IU]/ML
30 INJECTION, SOLUTION SUBCUTANEOUS ONCE
Status: COMPLETED | OUTPATIENT
Start: 2021-12-12 | End: 2021-12-12

## 2021-12-12 RX ORDER — IPRATROPIUM BROMIDE AND ALBUTEROL SULFATE 2.5; .5 MG/3ML; MG/3ML
3 SOLUTION RESPIRATORY (INHALATION)
Status: DISCONTINUED | OUTPATIENT
Start: 2021-12-12 | End: 2021-12-16 | Stop reason: HOSPADM

## 2021-12-12 RX ADMIN — ROSUVASTATIN 20 MG: 20 TABLET, FILM COATED ORAL at 21:38

## 2021-12-12 RX ADMIN — ENOXAPARIN SODIUM 40 MG: 100 INJECTION SUBCUTANEOUS at 08:48

## 2021-12-12 RX ADMIN — AZITHROMYCIN 500 MG: 500 TABLET, FILM COATED ORAL at 08:47

## 2021-12-12 RX ADMIN — INSULIN GLARGINE 30 UNITS: 100 INJECTION, SOLUTION SUBCUTANEOUS at 11:24

## 2021-12-12 RX ADMIN — MONTELUKAST 10 MG: 10 TABLET, FILM COATED ORAL at 21:37

## 2021-12-12 RX ADMIN — PANTOPRAZOLE SODIUM 40 MG: 40 TABLET, DELAYED RELEASE ORAL at 08:47

## 2021-12-12 RX ADMIN — GLIMEPIRIDE 4 MG: 4 TABLET ORAL at 16:30

## 2021-12-12 RX ADMIN — METHYLPREDNISOLONE SODIUM SUCCINATE 40 MG: 40 INJECTION, POWDER, FOR SOLUTION INTRAMUSCULAR; INTRAVENOUS at 13:35

## 2021-12-12 RX ADMIN — FENOFIBRATE 145 MG: 145 TABLET ORAL at 08:47

## 2021-12-12 RX ADMIN — METHYLPREDNISOLONE SODIUM SUCCINATE 40 MG: 40 INJECTION, POWDER, FOR SOLUTION INTRAMUSCULAR; INTRAVENOUS at 06:25

## 2021-12-12 RX ADMIN — METHYLPREDNISOLONE SODIUM SUCCINATE 40 MG: 40 INJECTION, POWDER, FOR SOLUTION INTRAMUSCULAR; INTRAVENOUS at 21:37

## 2021-12-12 RX ADMIN — INSULIN GLARGINE 30 UNITS: 100 INJECTION, SOLUTION SUBCUTANEOUS at 08:47

## 2021-12-12 RX ADMIN — CARVEDILOL 6.25 MG: 6.25 TABLET, FILM COATED ORAL at 16:26

## 2021-12-12 RX ADMIN — Medication 1 LOZENGE: at 02:24

## 2021-12-12 RX ADMIN — IPRATROPIUM BROMIDE AND ALBUTEROL SULFATE 3 ML: .5; 3 SOLUTION RESPIRATORY (INHALATION) at 08:13

## 2021-12-12 RX ADMIN — INSULIN LISPRO 6 UNITS: 100 INJECTION, SOLUTION INTRAVENOUS; SUBCUTANEOUS at 21:37

## 2021-12-12 RX ADMIN — INSULIN LISPRO 7 UNITS: 100 INJECTION, SOLUTION INTRAVENOUS; SUBCUTANEOUS at 08:47

## 2021-12-12 RX ADMIN — POTASSIUM CHLORIDE 10 MEQ: 750 TABLET, FILM COATED, EXTENDED RELEASE ORAL at 11:25

## 2021-12-12 RX ADMIN — INSULIN LISPRO 18 UNITS: 100 INJECTION, SOLUTION INTRAVENOUS; SUBCUTANEOUS at 11:23

## 2021-12-12 RX ADMIN — HYDROCHLOROTHIAZIDE 25 MG: 25 TABLET ORAL at 08:47

## 2021-12-12 RX ADMIN — Medication 10 ML: at 21:38

## 2021-12-12 RX ADMIN — Medication 10 ML: at 06:25

## 2021-12-12 RX ADMIN — IPRATROPIUM BROMIDE AND ALBUTEROL SULFATE 3 ML: .5; 3 SOLUTION RESPIRATORY (INHALATION) at 02:31

## 2021-12-12 RX ADMIN — GLIMEPIRIDE 4 MG: 4 TABLET ORAL at 08:52

## 2021-12-12 RX ADMIN — INSULIN LISPRO 10 UNITS: 100 INJECTION, SOLUTION INTRAVENOUS; SUBCUTANEOUS at 16:26

## 2021-12-12 RX ADMIN — HUMAN INSULIN 10 UNITS: 100 INJECTION, SUSPENSION SUBCUTANEOUS at 06:25

## 2021-12-12 RX ADMIN — BUPROPION HYDROCHLORIDE 150 MG: 150 TABLET, EXTENDED RELEASE ORAL at 08:47

## 2021-12-12 RX ADMIN — Medication 10 ML: at 13:35

## 2021-12-12 RX ADMIN — POTASSIUM CHLORIDE 10 MEQ: 750 TABLET, FILM COATED, EXTENDED RELEASE ORAL at 17:09

## 2021-12-12 RX ADMIN — CARVEDILOL 6.25 MG: 6.25 TABLET, FILM COATED ORAL at 08:47

## 2021-12-12 RX ADMIN — IPRATROPIUM BROMIDE AND ALBUTEROL SULFATE 3 ML: .5; 3 SOLUTION RESPIRATORY (INHALATION) at 20:10

## 2021-12-12 RX ADMIN — SERTRALINE 100 MG: 50 TABLET, FILM COATED ORAL at 08:47

## 2021-12-12 NOTE — PROGRESS NOTES
Problem: Falls - Risk of  Goal: *Absence of Falls  Description: Document Srinivas Church Fall Risk and appropriate interventions in the flowsheet. Outcome: Progressing Towards Goal  Note: Fall Risk Interventions:  Mobility Interventions: Bed/chair exit alarm, Communicate number of staff needed for ambulation/transfer, Patient to call before getting OOB         Medication Interventions: Bed/chair exit alarm, Patient to call before getting OOB                   Problem: Patient Education: Go to Patient Education Activity  Goal: Patient/Family Education  Outcome: Progressing Towards Goal     Problem: Pressure Injury - Risk of  Goal: *Prevention of pressure injury  Description: Document Hari Scale and appropriate interventions in the flowsheet.   Outcome: Progressing Towards Goal  Note: Pressure Injury Interventions:  Sensory Interventions: Assess changes in LOC, Float heels, Keep linens dry and wrinkle-free, Maintain/enhance activity level, Minimize linen layers    Moisture Interventions: Absorbent underpads, Apply protective barrier, creams and emollients, Check for incontinence Q2 hours and as needed, Internal/External urinary devices    Activity Interventions: Assess need for specialty bed, Increase time out of bed, PT/OT evaluation    Mobility Interventions: Assess need for specialty bed, Float heels, HOB 30 degrees or less, PT/OT evaluation    Nutrition Interventions: Document food/fluid/supplement intake                     Problem: Patient Education: Go to Patient Education Activity  Goal: Patient/Family Education  Outcome: Progressing Towards Goal

## 2021-12-12 NOTE — PROGRESS NOTES
General Daily Progress Note    Admit Date: 12/11/2021  Hospital day 2    Subjective:     Patient has no complaint of shortness of breath. overall feels much better than yesterday. Currently on room air, O2 sats in the mid 90s. ON 30 mg prednisone daily at home. Medication side effects: hyperglycemia.      Current Facility-Administered Medications   Medication Dose Route Frequency    acetaminophen (TYLENOL) tablet 650 mg  650 mg Oral Q6H PRN    buPROPion XL (WELLBUTRIN XL) tablet 150 mg  150 mg Oral DAILY    carvediloL (COREG) tablet 6.25 mg  6.25 mg Oral BID WITH MEALS    fenofibrate nanocrystallized (TRICOR) tablet 145 mg  145 mg Oral DAILY    glimepiride (AMARYL) tablet 4 mg  4 mg Oral ACB&D    hydroCHLOROthiazide (HYDRODIURIL) tablet 25 mg  25 mg Oral DAILY    insulin glargine (LANTUS) injection 30 Units  30 Units SubCUTAneous DAILY    pantoprazole (PROTONIX) tablet 40 mg  40 mg Oral DAILY    rosuvastatin (CRESTOR) tablet 20 mg  20 mg Oral QHS    sertraline (ZOLOFT) tablet 100 mg  100 mg Oral DAILY    sodium chloride (NS) flush 5-40 mL  5-40 mL IntraVENous Q8H    sodium chloride (NS) flush 5-40 mL  5-40 mL IntraVENous PRN    acetaminophen (TYLENOL) tablet 650 mg  650 mg Oral Q6H PRN    Or    acetaminophen (TYLENOL) suppository 650 mg  650 mg Rectal Q6H PRN    polyethylene glycol (MIRALAX) packet 17 g  17 g Oral DAILY PRN    ondansetron (ZOFRAN ODT) tablet 4 mg  4 mg Oral Q8H PRN    Or    ondansetron (ZOFRAN) injection 4 mg  4 mg IntraVENous Q6H PRN    enoxaparin (LOVENOX) injection 40 mg  40 mg SubCUTAneous DAILY    insulin lispro (HUMALOG) injection   SubCUTAneous AC&HS    glucose chewable tablet 16 g  4 Tablet Oral PRN    dextrose (D50W) injection syrg 12.5-25 g  12.5-25 g IntraVENous PRN    glucagon (GLUCAGEN) injection 1 mg  1 mg IntraMUSCular PRN    insulin NPH (NOVOLIN N, HUMULIN N) injection 10 Units  10 Units SubCUTAneous Q8H    azithromycin (ZITHROMAX) tablet 500 mg  500 mg Oral DAILY    montelukast (SINGULAIR) tablet 10 mg  10 mg Oral QHS    LORazepam (ATIVAN) tablet 1 mg  1 mg Oral QHS PRN    albuterol-ipratropium (DUO-NEB) 2.5 MG-0.5 MG/3 ML  3 mL Nebulization Q6H RT    methylPREDNISolone (PF) (SOLU-MEDROL) injection 40 mg  40 mg IntraVENous Q8H    benzocaine-menthoL (CHLORASEPTIC MAX) lozenge 1 Lozenge  1 Lozenge Oral Q2H PRN        Review of Systems  Pertinent items are noted in HPI. Objective:     Patient Vitals for the past 8 hrs:   BP Temp Pulse Resp SpO2   12/12/21 0847 127/76  86     12/12/21 0814     100 %   12/12/21 0653 117/65 97.5 °F (36.4 °C) 64 20 98 %   12/12/21 0349 119/73 97.5 °F (36.4 °C) 74 20 95 %   12/12/21 0231     97 %     No intake/output data recorded. No intake/output data recorded. Physical Exam:   Visit Vitals  /76   Pulse 86   Temp 97.5 °F (36.4 °C)   Resp 20   Ht 5' 3\" (1.6 m)   Wt 199 lb 4.7 oz (90.4 kg)   SpO2 100%   BMI 35.30 kg/m²     Lungs: rhonchi R base, L base  Heart: regular rate and rhythm, S1, S2 normal, no murmur, click, rub or gallop  Abdomen: soft, non-tender.  Bowel sounds normal. No masses,  no organomegaly  Extremities: extremities normal, atraumatic, no cyanosis or edema      ECG: normal sinus rhythm     Data Review   Recent Results (from the past 24 hour(s))   GLUCOSE, POC    Collection Time: 12/11/21 12:52 PM   Result Value Ref Range    Glucose (POC) 364 (H) 65 - 117 mg/dL    Performed by Walter MillerEDT)    GLUCOSE, POC    Collection Time: 12/11/21  4:02 PM   Result Value Ref Range    Glucose (POC) 403 (H) 65 - 117 mg/dL    Performed by Gardenia Malcolm (PCT)    GLUCOSE, POC    Collection Time: 12/11/21  8:49 PM   Result Value Ref Range    Glucose (POC) 357 (H) 65 - 117 mg/dL    Performed by Diana Payton    GLUCOSE, POC    Collection Time: 12/12/21  6:25 AM   Result Value Ref Range    Glucose (POC) 322 (H) 65 - 117 mg/dL    Performed by [address] [place] [address], POC    Collection Time: 12/12/21 7:59 AM   Result Value Ref Range    Glucose (POC) 297 (H) 65 - 117 mg/dL    Performed by Izzy Ang RN            Assessment:     Active Problems:    ILD (interstitial lung disease) ([facility] (12/11/2021)      Acute on chronic respiratory failure ([facility] (12/11/2021)      Hyperglycemia due to diabetes mellitus ([facility] (12/11/2021)        Plan:     1. ILD with exacerbation- symptomatically improved, currently on room air. Continue solumedrol, nebulizers  2. Diabetes exacerbated by steroids. Currently on lantus 30 units daily, Novolin N 10 units tid, glimepiride 4 mg bid and sliding scale lispro. Will change lantus to 70 units daily, dc novolin N, and increase sliding scale doses.    3. Hypokalemia- replace

## 2021-12-12 NOTE — PROGRESS NOTES
ADULT PROTOCOL: JET AEROSOL ASSESSMENT    Patient  Eddie Price     78 y.o.   female     12/12/2021  8:27 AM    Breath Sounds Pre Procedure: Right Breath Sounds: Clear                               Left Breath Sounds: Clear    Breath Sounds Post Procedure:                                        Breathing pattern: Pre procedure Breathing Pattern: Regular          Post procedure Breathing Pattern: Regular    Heart Rate: Pre procedure Pulse: 73           Post procedure Pulse: 72    Resp Rate: Pre procedure Respirations: 18           Post procedure Respirations: 18      Cough: Pre procedure Cough: Non-productive               Post procedure Cough: Non-productive, Dry      Oxygen: Room Air  Changed:NO    SpO2: Pre procedure SpO2: 100 %                 Post procedure SpO2: 93 %      Nebulizer Therapy: Current medications Aerosolized Medications: DuoNeb      Changed: NO        Problem List:   Patient Active Problem List   Diagnosis Code    Controlled type 2 diabetes mellitus with stage 2 chronic kidney disease, with long-term current use of insulin ([facility]) E11.22, N18.2, Z79.4    Non-seasonal allergic rhinitis J30.89    Class 1 obesity due to excess calories without serious comorbidity with body mass index (BMI) of 33.0 to 33.9 in adult E66.09, Z68.33    Rosacea L71.9    Mixed hyperlipidemia E78.2    Anxiety F41.9    GI bleed K92.2    Overactive bladder N32.81    Polymyalgia rheumatica ([facility]) M35.3    IBS (irritable bowel syndrome) K58.9    Hypertension with renal disease I12.9    CKD (chronic kidney disease), stage II N18.2    Avascular necrosis of hip ([facility]) M87.059    Abnormal LFTs R94.5    Vitamin D deficiency E55.9    Recurrent depression ([facility]) F33.9    Primary osteoarthritis involving multiple joints M89.49    Sleep disturbance G47.9    Lumbar disc disease M51.9    Spinal stenosis, lumbar M48.061    Dyspnea on exertion R06.00    Spinal stenosis, cervical region M48.02    Cervical stenosis of spine M48.02    Interstitial lung disease ([facility] J84.9    Acute bronchitis J20.9    Non-recurrent acute suppurative otitis media of left ear without spontaneous rupture of tympanic membrane H66.002    Impacted cerumen of right ear H61.21    Other fatigue R53.83    Gait instability R26.81    Glossitis K14.0    Cough R05.9    Alcohol screening Z13.39    Primary osteoarthritis of right shoulder M19.011    Acute non-recurrent maxillary sinusitis J01.00    Tremor R25.1    Severe obesity ([facility]) E66.01    Chronic midline low back pain without sciatica M54.50, G89.29    ILD (interstitial lung disease) ([facility]) J84.9    Acute on chronic respiratory failure ([facility]) J96.20    Hyperglycemia due to diabetes mellitus ([facility] E11.65       Respiratory Therapist: Jennifer Alvarez RT

## 2021-12-12 NOTE — PROGRESS NOTES
Bedside shift change report given to Shad Hernández RN (oncoming nurse) by [place] RN (offgoing nurse). Report included the following information SBAR, Kardex, Intake/Output, MAR, Recent Results and Cardiac Rhythm NSR.     1109: Paged Dr. Quincy Alvarado regarding . Verbal orders to give 30 units of Lantus and 18 units of Lispro at this time. 1900 End of Shift Note    Bedside shift change report given to Zoie Ervin RN (oncoming nurse) by Roderick Garcia RN (offgoing nurse). Report included the following information SBAR, Kardex, Intake/Output, MAR, Recent Results and Cardiac Rhythm NSR    Shift worked:  7a-7p     Shift summary and any significant changes:    Lantus increased to 70 units    20mEq K given this shift    O2 weanted to room air    BMx1   Concerns for physician to address: PT/OT orders     [facility]  [phone]     Activity:  Activity Level: Up with Assistance  Number times ambulated in hallways past shift: 0  Number of times OOB to chair past shift: 0    Cardiac:   Cardiac Monitoring: Yes      Cardiac Rhythm: Sinus Rhythm    Access:   Current line(s): PIV     Genitourinary:   Urinary status: voiding and external catheter    Respiratory:   O2 Device: None (Room air)  Chronic home O2 use?: NO  Incentive spirometer at bedside: NO     GI:     Current diet:  ADULT DIET Regular; 4 carb choices (60 gm/meal); Low Fat/Low Chol/High Fiber/2 gm Na  Passing flatus: YES  Tolerating current diet: YES       Pain Management:   Patient states pain is manageable on current regimen: YES    Skin:  Hari Score: 17  Interventions: float heels, increase time out of bed and internal/external urinary devices    Patient Safety:  Fall Score:  Total Score: 2  Interventions: bed/chair alarm, gripper socks, pt to call before getting OOB and stay with me (per policy)  High Fall Risk: Yes    Length of Stay:  Expected LOS: - - -  Actual LOS: 1      Roderick Garcia RN

## 2021-12-12 NOTE — PROGRESS NOTES
Pulmonary, Critical Care, and Sleep Medicine    Initial Patient Consult    Name: Giles Hughes MRN: [id number]   : 1945 Hospital: [facility]   Date: 2021          IMPRESSION:   · ILD, total lung capacity was 2.63 L which is 57% predicted on 2021. Appears to be more fibrotic disease on most recent CT scan of the chest on patient also is noted to have what appears to be worsening inflammation versus an atypical infection or inflammatory process. · Acute Hypoxic respiratory Failure. Last 24 hrs she is feeling better. Less dyspnea and less cough. · 6  Minute walk test on 21: Walked 147 meter. No desaturation. Pox was 94% after walking. · Scoliosis  · S/p Covid vaccine back in March. · T2DM with hyperglycemia, will have to monitor with use of steroids. · RLS  · Moderately Severe ANGELINE-she has sleep follow up appt this Wednesday. · Discussed with Dr. Phil Girard this am.       RECOMMENDATIONS:   · At this point would agree to continue the  IV steroids. May be able to convert back to oral tomorrow. · Will ask Pt and Ot to eval and treat. · Oxygen to keep her pulse oximetry over 90%  · Airway clearance therapies  · Monitor her glucose very closely on the steroids  · Continue the current Abx therapy. Subjective:   Last 24 hrs: Her cough has been better. No headaches. Has mild sinus congestion. No chest pain or back pain. NO leg pain. She was able to get some sleep last pm. Has pretty good appetite. No issues with dysphagia at present. This patient has been seen and evaluated at the request of Dr. Gabrielle Henson for above. Patient is a 78 y.o. female Who is well known to me. She reports that she has had worsening cough for the last 1 week. She has tried increased dose of prednisone as an outpatient  (30mg) which did not improve her symptoms. Due to her severe fatigue and dyspnea she's had a decreased appetite for last 3 days.   She's had ongoing issues with dysphaaia she felt like this may be due to her weakness, has been avoiding foods. Lives by her self no sick contacts. She felt like she had oral herpes of her lips, mouth earlier this week, Seems improved with acyclovir. Past Medical History:   Diagnosis Date    Abnormal LFTs 08/24/2017    FATTY LIVER    Arthritis     OSTEO    Avascular necrosis of hip ([address] 08/24/2017    BILAT HIPS    Breast CA ([address] 1989, 2001    BILATERAL; SURGERY, CHEMO    Chronic pain     CKD (chronic kidney disease), stage II 8/24/2017    Coagulation disorder ([address] 1984    ITP  (DR CHU BRADSHAW) - PLATELETS DROPPED TO 5K    Depression     Diabetes ([address]     TYPE 2; NIDDM    DJD (degenerative joint disease), multiple sites 8/24/2017    GI bleed 2008    HEMORRHOIDS    Hyperlipemia     Hypertension with renal disease 8/24/2017    IBS (irritable bowel syndrome) 8/24/2017    Ill-defined condition 2015    PNEUMONIA X2 - HOSPITALIZED IN 2015    Interstitial lung disease ([address] 04/01/2019    Liver disease     FATTY LIVER    Myalgia 8/24/2017    On statin therapy 8/24/2017    Overactive bladder 8/24/2017    Pneumonia 04/2015    HOSPITALIZED 3 WEEKS.     Polymyalgia rheumatica ([address] 8/24/2017    Prophylactic antibiotic 8/24/2017    Reflex abnormality     acid reflex    Rosacea       Past Surgical History:   Procedure Laterality Date    HX APPENDECTOMY  1950    HX BREAST BIOPSY Bilateral     HX CATARACT REMOVAL Bilateral     HX COLONOSCOPY      HX ENDOSCOPY      HX GI      COLONOSCOPY    HX HEENT      WISDOM TEETH    HX HYSTERECTOMY  2000S    HX MASTECTOMY Right 1989    HX MASTECTOMY Left 2001    HX ORTHOPAEDIC  2008    LUMBAR FUSION    HX ORTHOPAEDIC  2018    RE-DO LUMBAR FUSION, AND ADDED THORACIC FUSION    HX ORTHOPAEDIC  03/26/2019    neckectomy    HX TUBAL LIGATION  1981    HX UROLOGICAL  2000s    BLADDER SLING    WA BREAST SURGERY PROCEDURE UNLISTED  1993, 2002    RECONSTRUCTION X2    WA TOTAL HIP ARTHROPLASTY Left 11/16/2016    ANTERIOR APPROACH, DR Gwendolyn De La Torre; (POSTOP: STANDS ONE INCH TALLER ON LEFT FOOT)    DE TOTAL HIP ARTHROPLASTY Right 12/2016    ANTERIOR APPROACH (DR Gwendolyn De La Torre)      Prior to Admission medications    Medication Sig Start Date End Date Taking? Authorizing Provider   glimepiride (AMARYL) 4 mg tablet TAKE 1 TABLET TWICE A DAY (DOSE/DIRECTIONS CHANGED) 11/9/21   Dale Jonas MD   insulin glargine (Lantus Solostar U-100 Insulin) 100 unit/mL (3 mL) inpn INJECT 30 UNITS DAILY 10/26/21   Dale Jonas MD   rosuvastatin (CRESTOR) 20 mg tablet Take 1 Tablet by mouth nightly. 10/21/21   Dale Jonas MD   buPROPion XL (WELLBUTRIN XL) 150 mg tablet TAKE 1 TABLET DAILY 9/14/21   Dale Jonas MD   benzonatate (TESSALON) 200 mg capsule TAKE 1 CAPSULE BY MOUTH THREE TIMES DAILY AS NEEDED FOR COUGH - SWALLOW CAPSULE WHOLE (DO NOT CRUSH) 8/30/21   Dale Jonas MD   predniSONE (DELTASONE) 20 mg tablet Take one tablet daily 8/6/21   Dale Jonas MD   Januvia 100 mg tablet TAKE 1 TABLET DAILY 8/2/21   Dale Jonas MD   sertraline (ZOLOFT) 100 mg tablet TAKE 1 TABLET DAILY 8/2/21   Dale Jonas MD   carvediloL (COREG) 6.25 mg tablet TAKE 1 TABLET TWICE A DAY 7/19/21   Dale Jonas MD   BD Ultra-Fine Short Pen Needle 31 gauge x 5/16\" ndle USE UNDER THE SKIN DAILY. USE WITH LANTUS FLEX PEN DAILY 7/13/21   Dale Jonas MD   fenofibrate nanocrystallized (TRICOR) 145 mg tablet Take 1 Tablet by mouth daily. 6/14/21   Dale Jonas MD   insulin glargine (LANTUS,BASAGLAR) 100 unit/mL (3 mL) inpn Take 30 units daily 4/13/21   Dale Jonas MD   metFORMIN ER (GLUCOPHAGE XR) 500 mg tablet Take two tablet daily with dinner.  4/13/21   Dale Jonas MD   hydroCHLOROthiazide (HYDRODIURIL) 25 mg tablet TAKE 1 TABLET DAILY FOR FLUID AND BLOOD PRESSURE 4/5/21   Dale Jonas MD   glucose blood VI test strips (True Metrix Glucose Test Strip) strip USE ONCE DAILY AND RECORD RESULTS IN A NOTEBOOK ALSO RECORD LAST MEALTIME IN NOTEBOOK 3/23/21   Cy Pena MD   clemastine 2.68 mg tab tablet Take 2.68 mg by mouth two (2) times a day. Provider, Historical   doxycycline (VIBRAMYCIN) 100 mg capsule TAKE 1 (ONE) CAPSULE BY MOUTH TWICE DAILY WITH FOOD 8/27/20   Provider, Historical   oxazepam (SERAX) 15 mg capsule TAKE 2 CAPSULES BY MOUTH NIGHTLY AT BEDTIME 8/13/20   Cy Pena MD   mupirocin [facility]) 2 % ointment Apply  to affected area three (3) times daily. 8/13/20   Cy Pena MD   clindamycin (CLEOCIN) 300 mg capsule Take 2 capsules 1 hour before dental procedure 8/13/20   Cy Pena MD   pantoprazole (PROTONIX) 40 mg tablet Take 40 mg by mouth daily. 11/21/19   Provider, Historical   montelukast sodium (SINGULAIR PO) Take 10 mg by mouth.  10 mg tab    Provider, Historical     Allergies   Allergen Reactions    Metformin Diarrhea    Statins-Hmg-Coa Reductase Inhibitors Myalgia     Myalgia with  multiple statins  Takes pravachol     Codeine Rash     Rash on thighs    Darvon [Propoxyphene] Other (comments)     \"I see worms\"    Pcn [Penicillins] Rash    Sulfa (Sulfonamide Antibiotics) Rash      Social History     Tobacco Use    Smoking status: Never Smoker    Smokeless tobacco: Never Used   Substance Use Topics    Alcohol use: No      Family History   Problem Relation Age of Onset    Heart Disease Mother     Kidney Disease Mother     Lung Disease Mother         COPD    Diabetes Brother     Pacemaker Brother     Kidney Disease Brother     Hypertension Brother     Anesth Problems Neg Hx         Current Facility-Administered Medications   Medication Dose Route Frequency    [START ON 12/13/2021] insulin glargine (LANTUS) injection 70 Units  70 Units SubCUTAneous DAILY    potassium chloride SR (KLOR-CON 10) tablet 10 mEq  10 mEq Oral BID    buPROPion XL (WELLBUTRIN XL) tablet 150 mg  150 mg Oral DAILY    carvediloL (COREG) tablet 6.25 mg  6.25 mg Oral BID WITH MEALS    fenofibrate nanocrystallized (TRICOR) tablet 145 mg  145 mg Oral DAILY    glimepiride (AMARYL) tablet 4 mg  4 mg Oral ACB&D    hydroCHLOROthiazide (HYDRODIURIL) tablet 25 mg  25 mg Oral DAILY    pantoprazole (PROTONIX) tablet 40 mg  40 mg Oral DAILY    rosuvastatin (CRESTOR) tablet 20 mg  20 mg Oral QHS    sertraline (ZOLOFT) tablet 100 mg  100 mg Oral DAILY    sodium chloride (NS) flush 5-40 mL  5-40 mL IntraVENous Q8H    enoxaparin (LOVENOX) injection 40 mg  40 mg SubCUTAneous DAILY    insulin lispro (HUMALOG) injection   SubCUTAneous AC&HS    azithromycin (ZITHROMAX) tablet 500 mg  500 mg Oral DAILY    montelukast (SINGULAIR) tablet 10 mg  10 mg Oral QHS    albuterol-ipratropium (DUO-NEB) 2.5 MG-0.5 MG/3 ML  3 mL Nebulization Q6H RT    methylPREDNISolone (PF) (SOLU-MEDROL) injection 40 mg  40 mg IntraVENous Q8H       Review of Systems:  Constitutional: positive for fevers, chills and fatigue  Eyes: negative  Ears, nose, mouth, throat, and face: positive for nasal congestion  Respiratory: positive for cough, sputum, hemoptysis, dyspnea on exertion or chronic bronchitis  Cardiovascular: negative  Gastrointestinal: positive for dysphagia  Genitourinary:negative  Integument/breast: negative  Hematologic/lymphatic: negative  Musculoskeletal:positive for myalgias  Neurological: negative  Behavioral/Psych: negative  Endocrine: negative  Allergic/Immunologic: positive for hay fever    Objective:   Vital Signs:    Visit Vitals  /76   Pulse 86   Temp 97.5 °F (36.4 °C)   Resp 20   Ht 5' 3\" (1.6 m)   Wt 90.4 kg (199 lb 4.7 oz)   SpO2 100%   BMI 35.30 kg/m²       O2 Device: None (Room air)   O2 Flow Rate (L/min): 1.5 l/min   Temp (24hrs), Av.9 °F (36.6 °C), Min:97.5 °F (36.4 °C), Max:98.5 °F (36.9 °C)       Intake/Output:   Last shift:       0701 -  1900  In: -   Out: 900 [Urine:900]  Last 3 shifts: No intake/output data recorded. Intake/Output Summary (Last 24 hours) at 12/12/2021 0914  Last data filed at 12/12/2021 0477  Gross per 24 hour   Intake    Output 900 ml   Net -900 ml      Physical Exam:   General:  Alert, cooperative, no distress, appears stated age. Lying in bed, seems to have better energy today. On 2L NC with pox of 95%. Conversant, no coughing. .    Head:  Normocephalic, without obvious abnormality, atraumatic. Eyes:  Conjunctivae/corneas clear. PERRL, EOMs intact. Nose: Nares normal. Septum midline. Mucosa normal. No drainage or sinus tenderness. Throat: Lips, mucosa, and tongue normal. Teeth and gums normal.   Neck: Supple, symmetrical, trachea midline, no adenopathy, thyroid: no enlargment/tenderness/nodules, no carotid bruit and no JVD. Back:   Symmetric, no curvature. ROM normal.   Lungs:   Bilateral basilar rales. Comfortable. Pretty clear anteriorly. Chest wall:  No tenderness or deformity. Heart:  Regular rate and rhythm, S1, S2 normal, no murmur, click, rub or gallop. Abdomen:   Soft, non-tender. Bowel sounds normal. No masses,  No organomegaly. Extremities: Extremities normal, atraumatic, no cyanosis or edema. Pulses: 2+ and symmetric all extremities. Skin: Skin color, texture, turgor normal. No rashes or lesions   Lymph nodes: Cervical, supraclavicular, and axillary nodes normal.   Neurologic: Grossly nonfocal, motor seems to be intact.       Data review:     Recent Results (from the past 24 hour(s))   GLUCOSE, POC    Collection Time: 12/11/21 12:52 PM   Result Value Ref Range    Glucose (POC) 364 (H) 65 - 117 mg/dL    Performed by Florina SEN (EDT)    GLUCOSE, POC    Collection Time: 12/11/21  4:02 PM   Result Value Ref Range    Glucose (POC) 403 (H) 65 - 117 mg/dL    Performed by Edward Dong (PCT)    GLUCOSE, POC    Collection Time: 12/11/21  8:49 PM   Result Value Ref Range    Glucose (POC) 357 (H) 65 - 117 mg/dL    Performed by JUS Ivey Collection Time: 12/12/21  6:25 AM   Result Value Ref Range    Glucose (POC) 322 (H) 65 - 117 mg/dL    Performed by [id number], [facility]    Collection Time: 12/12/21  7:59 AM   Result Value Ref Range    Glucose (POC) 297 (H) 65 - 117 mg/dL    Performed by Jarod Catalan RN        Imaging:  I have personally reviewed the patients radiographs and have reviewed the reports:  12/11/2021: FINDINGS: This is a good quality study for the evaluation of pulmonary embolism  to the first subsegmental arterial level. There is no pulmonary embolism to this  level.      THYROID: Left hemithyroidectomy. Stable 2.0 cm and 0.6 cm hypodense right  thyroid lobe nodules  CHEST WALL: Bilateral mastectomies. Chronically ruptured left breast implant. MEDIASTINUM: No mass or lymphadenopathy. MARIAN: No mass or lymphadenopathy. Several calcified right hilar lymph nodes. THORACIC AORTA: No aneurysm. Atherosclerosis. HEART: Normal in size. Scattered coronary artery calcifications. ESOPHAGUS: No wall thickening or dilatation. TRACHEA/BRONCHI: Patent. PLEURA: No effusion or pneumothorax. LUNGS: Widespread subtle subpleural fibrotic changes. Widespread scattered  patchy groundglass opacities and interlobular septal thickening. UPPER ABDOMEN: Partially imaged. No acute pathology. BONES: No aggressive bone lesion or fracture. Partially visualized cervical and  thoracolumbar spine hardware. ? Renal osteodystrophy.     IMPRESSION  1. No pulmonary embolism. 2.  Widespread patchy groundglass opacities and interlobular septal thickening  may reflect multifocal atypical infection, inflammatory process, and/or edema.         Angeline Antonio MD

## 2021-12-13 ENCOUNTER — TELEPHONE (OUTPATIENT)
Dept: INTERNAL MEDICINE CLINIC | Age: 76
End: 2021-12-13

## 2021-12-13 LAB
GLUCOSE BLD STRIP.AUTO-MCNC: 190 MG/DL (ref 65–117)
GLUCOSE BLD STRIP.AUTO-MCNC: 247 MG/DL (ref 65–117)
GLUCOSE BLD STRIP.AUTO-MCNC: 289 MG/DL (ref 65–117)
GLUCOSE BLD STRIP.AUTO-MCNC: 375 MG/DL (ref 65–117)
SERVICE CMNT-IMP: ABNORMAL

## 2021-12-13 PROCEDURE — 74011250636 HC RX REV CODE- 250/636: Performed by: INTERNAL MEDICINE

## 2021-12-13 PROCEDURE — 74011636637 HC RX REV CODE- 636/637: Performed by: FAMILY MEDICINE

## 2021-12-13 PROCEDURE — 82962 GLUCOSE BLOOD TEST: CPT

## 2021-12-13 PROCEDURE — 65660000000 HC RM CCU STEPDOWN

## 2021-12-13 PROCEDURE — 74011250637 HC RX REV CODE- 250/637: Performed by: FAMILY MEDICINE

## 2021-12-13 PROCEDURE — 97116 GAIT TRAINING THERAPY: CPT | Performed by: PHYSICAL THERAPIST

## 2021-12-13 PROCEDURE — 74011250637 HC RX REV CODE- 250/637: Performed by: INTERNAL MEDICINE

## 2021-12-13 PROCEDURE — 99233 SBSQ HOSP IP/OBS HIGH 50: CPT | Performed by: INTERNAL MEDICINE

## 2021-12-13 PROCEDURE — 81001 URINALYSIS AUTO W/SCOPE: CPT

## 2021-12-13 PROCEDURE — 74011636637 HC RX REV CODE- 636/637: Performed by: INTERNAL MEDICINE

## 2021-12-13 PROCEDURE — 97161 PT EVAL LOW COMPLEX 20 MIN: CPT | Performed by: PHYSICAL THERAPIST

## 2021-12-13 PROCEDURE — 74011000250 HC RX REV CODE- 250: Performed by: INTERNAL MEDICINE

## 2021-12-13 PROCEDURE — 94640 AIRWAY INHALATION TREATMENT: CPT

## 2021-12-13 RX ORDER — BENZONATATE 100 MG/1
200 CAPSULE ORAL 3 TIMES DAILY
Status: DISCONTINUED | OUTPATIENT
Start: 2021-12-13 | End: 2021-12-16 | Stop reason: HOSPADM

## 2021-12-13 RX ADMIN — INSULIN GLARGINE 70 UNITS: 100 INJECTION, SOLUTION SUBCUTANEOUS at 08:41

## 2021-12-13 RX ADMIN — INSULIN LISPRO 18 UNITS: 100 INJECTION, SOLUTION INTRAVENOUS; SUBCUTANEOUS at 12:21

## 2021-12-13 RX ADMIN — GLIMEPIRIDE 4 MG: 4 TABLET ORAL at 08:41

## 2021-12-13 RX ADMIN — ENOXAPARIN SODIUM 40 MG: 100 INJECTION SUBCUTANEOUS at 08:41

## 2021-12-13 RX ADMIN — CARVEDILOL 6.25 MG: 6.25 TABLET, FILM COATED ORAL at 08:41

## 2021-12-13 RX ADMIN — BENZONATATE 200 MG: 100 CAPSULE ORAL at 17:20

## 2021-12-13 RX ADMIN — CARVEDILOL 6.25 MG: 6.25 TABLET, FILM COATED ORAL at 17:20

## 2021-12-13 RX ADMIN — BUPROPION HYDROCHLORIDE 150 MG: 150 TABLET, EXTENDED RELEASE ORAL at 08:40

## 2021-12-13 RX ADMIN — POTASSIUM CHLORIDE 10 MEQ: 750 TABLET, FILM COATED, EXTENDED RELEASE ORAL at 17:20

## 2021-12-13 RX ADMIN — MONTELUKAST 10 MG: 10 TABLET, FILM COATED ORAL at 21:24

## 2021-12-13 RX ADMIN — AZITHROMYCIN 500 MG: 500 TABLET, FILM COATED ORAL at 08:40

## 2021-12-13 RX ADMIN — METHYLPREDNISOLONE SODIUM SUCCINATE 40 MG: 40 INJECTION, POWDER, FOR SOLUTION INTRAMUSCULAR; INTRAVENOUS at 05:55

## 2021-12-13 RX ADMIN — GLIMEPIRIDE 4 MG: 4 TABLET ORAL at 17:20

## 2021-12-13 RX ADMIN — FENOFIBRATE 145 MG: 145 TABLET ORAL at 08:40

## 2021-12-13 RX ADMIN — BENZONATATE 200 MG: 100 CAPSULE ORAL at 08:40

## 2021-12-13 RX ADMIN — Medication 10 ML: at 21:24

## 2021-12-13 RX ADMIN — PANTOPRAZOLE SODIUM 40 MG: 40 TABLET, DELAYED RELEASE ORAL at 08:40

## 2021-12-13 RX ADMIN — SERTRALINE 100 MG: 50 TABLET, FILM COATED ORAL at 08:40

## 2021-12-13 RX ADMIN — IPRATROPIUM BROMIDE AND ALBUTEROL SULFATE 3 ML: .5; 3 SOLUTION RESPIRATORY (INHALATION) at 07:50

## 2021-12-13 RX ADMIN — Medication 10 ML: at 05:55

## 2021-12-13 RX ADMIN — POTASSIUM CHLORIDE 10 MEQ: 750 TABLET, FILM COATED, EXTENDED RELEASE ORAL at 08:41

## 2021-12-13 RX ADMIN — METHYLPREDNISOLONE SODIUM SUCCINATE 40 MG: 40 INJECTION, POWDER, FOR SOLUTION INTRAMUSCULAR; INTRAVENOUS at 21:24

## 2021-12-13 RX ADMIN — ROSUVASTATIN 20 MG: 20 TABLET, FILM COATED ORAL at 21:24

## 2021-12-13 RX ADMIN — IPRATROPIUM BROMIDE AND ALBUTEROL SULFATE 3 ML: .5; 3 SOLUTION RESPIRATORY (INHALATION) at 20:20

## 2021-12-13 RX ADMIN — INSULIN LISPRO 10 UNITS: 100 INJECTION, SOLUTION INTRAVENOUS; SUBCUTANEOUS at 17:20

## 2021-12-13 RX ADMIN — BENZONATATE 200 MG: 100 CAPSULE ORAL at 21:24

## 2021-12-13 RX ADMIN — Medication 10 ML: at 17:19

## 2021-12-13 RX ADMIN — INSULIN LISPRO 6 UNITS: 100 INJECTION, SOLUTION INTRAVENOUS; SUBCUTANEOUS at 08:42

## 2021-12-13 RX ADMIN — HYDROCHLOROTHIAZIDE 25 MG: 25 TABLET ORAL at 08:40

## 2021-12-13 NOTE — PROGRESS NOTES
Problem: Falls - Risk of  Goal: *Absence of Falls  Description: Document Everett Reason Fall Risk and appropriate interventions in the flowsheet.   Outcome: Progressing Towards Goal  Note: Fall Risk Interventions:  Mobility Interventions: Assess mobility with egress test, Bed/chair exit alarm, Communicate number of staff needed for ambulation/transfer, Mechanical lift, OT consult for ADLs, Patient to call before getting OOB, PT Consult for mobility concerns, PT Consult for assist device competence, Strengthening exercises (ROM-active/passive), Utilize walker, cane, or other assistive device         Medication Interventions: Assess postural VS orthostatic hypotension, Bed/chair exit alarm, Evaluate medications/consider consulting pharmacy, Patient to call before getting OOB, Teach patient to arise slowly, Utilize gait belt for transfers/ambulation         History of Falls Interventions: Bed/chair exit alarm, Consult care management for discharge planning, Door open when patient unattended, Evaluate medications/consider consulting pharmacy, Investigate reason for fall, Room close to nurse's station, Utilize gait belt for transfer/ambulation, Assess for delayed presentation/identification of injury for 48 hrs (comment for end date)

## 2021-12-13 NOTE — TELEPHONE ENCOUNTER
----- Message from Draganamberlisa Pillai sent at 12/11/2021 10:03 AM EST -----  Subject: Message to Provider    QUESTIONS  Information for Provider? Denis with the ER would like the provider to   know that the pt is being admit 12/11/21 call back [phone]  ---------------------------------------------------------------------------  --------------  CALL BACK INFO  What is the best way for the office to contact you? OK to leave message on   voicemail  Preferred Call Back Phone Number? [phone]  ---------------------------------------------------------------------------  --------------  SCRIPT ANSWERS  Relationship to Patient?  Third Party  Representative Name? Jack Luna

## 2021-12-13 NOTE — PROGRESS NOTES
Pulmonary, Critical Care, and Sleep Medicine     Patient Consult    Name: Rusty Dozier MRN: [id number]   : 1945 Hospital: [facility]   Date: 2021          IMPRESSION:   · ILD, total lung capacity was 2.63 L which is 57% predicted on 2021. Appears to be more fibrotic disease on most recent CT scan of the chest on patient also is noted to have what appears to be worsening inflammation versus an atypical infection or inflammatory process. · Moderate to severe coughing paroxsyms. Add scheduled tessalon perles. · Acute Hypoxic respiratory Failure. Last 24 hrs she is feeling better. Less dyspnea and less cough. · 6  Minute walk test on 21: Walked 147 meter. No desaturation. Pox was 94% after walking. · Scoliosis  · S/p Covid vaccine back in March. · T2DM with hyperglycemia, will have to monitor with use of steroids. · RLS  · Moderately Severe ANGELINE-she has sleep follow up appt this Wednesday. · Discussed with Dr. Claudetta Grant this am.       RECOMMENDATIONS:   · At this point would agree to continue the  IV steroids due to worsening cough. Dyspnea. · Will ask Pt and Ot to eval and treat. · Oxygen to keep her pulse oximetry over 90%  · Airway clearance therapies  · Monitor her glucose very closely on the steroids  · Continue the current Abx therapy. Subjective:   Last 24 hrs:   Pt has not been able to sleep. Had increased coughing and congestion. Her cough is moderate to severe, frequent. Nonproductive. May be a little worse with eating. Seems worse when she is sitting up. No better with cough drop. Denies any sinus drainage. NO overt aspiration with eating. NO chest pain, no back pain, no leg pain. 12-12:   Her cough has been better. No headaches. Has mild sinus congestion. No chest pain or back pain. NO leg pain. She was able to get some sleep last pm. Has pretty good appetite. No issues with dysphagia at present.      This patient has been seen and evaluated at the request of Dr. Brett Katz for above. Patient is a 78 y.o. female Who is well known to me. She reports that she has had worsening cough for the last 1 week. She has tried increased dose of prednisone as an outpatient  (30mg) which did not improve her symptoms. Due to her severe fatigue and dyspnea she's had a decreased appetite for last 3 days. She's had ongoing issues with dysphaaia she felt like this may be due to her weakness, has been avoiding foods. Lives by her self no sick contacts. She felt like she had oral herpes of her lips, mouth earlier this week, Seems improved with acyclovir. Past Medical History:   Diagnosis Date    Abnormal LFTs 08/24/2017    FATTY LIVER    Arthritis     OSTEO    Avascular necrosis of hip ([facility] 08/24/2017    BILAT HIPS    Breast CA ([facility] 1989, 2001    BILATERAL; SURGERY, CHEMO    Chronic pain     CKD (chronic kidney disease), stage II 8/24/2017    Coagulation disorder ([facility] 1984    ITP  (DR CHU BRADSHAW) - PLATELETS DROPPED TO 5K    Depression     Diabetes ([address]     TYPE 2; NIDDM    DJD (degenerative joint disease), multiple sites 8/24/2017    GI bleed 2008    HEMORRHOIDS    Hyperlipemia     Hypertension with renal disease 8/24/2017    IBS (irritable bowel syndrome) 8/24/2017    Ill-defined condition 2015    PNEUMONIA X2 - HOSPITALIZED IN 2015    Interstitial lung disease ([facility] 04/01/2019    Liver disease     FATTY LIVER    Myalgia 8/24/2017    On statin therapy 8/24/2017    Overactive bladder 8/24/2017    Pneumonia 04/2015    HOSPITALIZED 3 WEEKS.     Polymyalgia rheumatica ([address] 8/24/2017    Prophylactic antibiotic 8/24/2017    Reflex abnormality     acid reflex    Rosacea       Past Surgical History:   Procedure Laterality Date    HX APPENDECTOMY  1950    HX BREAST BIOPSY Bilateral     HX CATARACT REMOVAL Bilateral     HX COLONOSCOPY      HX ENDOSCOPY      HX GI      COLONOSCOPY    HX HEENT      WISDOM TEETH    HX HYSTERECTOMY  2000S    HX MASTECTOMY Right 1989    HX MASTECTOMY Left 2001    HX ORTHOPAEDIC  2008    LUMBAR FUSION    HX ORTHOPAEDIC  2018    RE-DO LUMBAR FUSION, AND ADDED THORACIC FUSION    HX ORTHOPAEDIC  03/26/2019    neckectomy    HX TUBAL LIGATION  1981    HX UROLOGICAL  2000s    BLADDER SLING    TN BREAST SURGERY PROCEDURE UNLISTED  1993, 2002    RECONSTRUCTION X2    TN TOTAL HIP ARTHROPLASTY Left 11/16/2016    ANTERIOR APPROACH, DR Gwendolyn De La Torre; (POSTOP: STANDS ONE INCH TALLER ON LEFT FOOT)    TN TOTAL HIP ARTHROPLASTY Right 12/2016    ANTERIOR APPROACH (DR Gwendolyn De La Torre)      Prior to Admission medications    Medication Sig Start Date End Date Taking? Authorizing Provider   glimepiride (AMARYL) 4 mg tablet TAKE 1 TABLET TWICE A DAY (DOSE/DIRECTIONS CHANGED) 11/9/21  Yes Kamilla Stanford MD   insulin glargine (Lantus Solostar U-100 Insulin) 100 unit/mL (3 mL) inpn INJECT 30 UNITS DAILY 10/26/21  Yes Kamilla Stanford MD   rosuvastatin (CRESTOR) 20 mg tablet Take 1 Tablet by mouth nightly. 10/21/21  Yes Kamilla Stanford MD   buPROPion XL (WELLBUTRIN XL) 150 mg tablet TAKE 1 TABLET DAILY 9/14/21  Yes Kamilla Stanford MD   benzonatate (TESSALON) 200 mg capsule TAKE 1 CAPSULE BY MOUTH THREE TIMES DAILY AS NEEDED FOR COUGH - SWALLOW CAPSULE WHOLE (DO NOT CRUSH) 8/30/21  Yes Kamilla Stanford MD   predniSONE (DELTASONE) 20 mg tablet Take one tablet daily 8/6/21  Yes Kamilla Stanford MD   Januvia 100 mg tablet TAKE 1 TABLET DAILY 8/2/21  Yes Kamilla Stanford MD   sertraline (ZOLOFT) 100 mg tablet TAKE 1 TABLET DAILY 8/2/21  Yes Kamilla Stanford MD   carvediloL (COREG) 6.25 mg tablet TAKE 1 TABLET TWICE A DAY 7/19/21  Yes Kamilla Stanford MD   BD Ultra-Fine Short Pen Needle 31 gauge x 5/16\" ndle USE UNDER THE SKIN DAILY. USE WITH LANTUS FLEX PEN DAILY 7/13/21  Yes Kamilla Stanford MD   fenofibrate nanocrystallized (TRICOR) 145 mg tablet Take 1 Tablet by mouth daily.  6/14/21  Yes Bridger Lester, Elpidio Poole MD   insulin glargine (LANTUS,BASAGLAR) 100 unit/mL (3 mL) inpn Take 30 units daily 4/13/21  Yes Verenice Mejia MD   metFORMIN ER (GLUCOPHAGE XR) 500 mg tablet Take two tablet daily with dinner. 4/13/21  Yes Verenice Mejia MD   hydroCHLOROthiazide (HYDRODIURIL) 25 mg tablet TAKE 1 TABLET DAILY FOR FLUID AND BLOOD PRESSURE 4/5/21  Yes Verenice Mejia MD   glucose blood VI test strips (True Metrix Glucose Test Strip) strip USE ONCE DAILY AND RECORD RESULTS IN A NOTEBOOK ALSO RECORD LAST MEALTIME IN NOTEBOOK 3/23/21  Yes Verenice Mejia MD   clemastine 2.68 mg tab tablet Take 2.68 mg by mouth two (2) times a day. Yes Provider, Historical   oxazepam (SERAX) 15 mg capsule TAKE 2 CAPSULES BY MOUTH NIGHTLY AT BEDTIME 8/13/20  Yes Verenice Mejia MD   mupirocin [facility]) 2 % ointment Apply  to affected area three (3) times daily. 8/13/20  Yes Verenice Meija MD   clindamycin (CLEOCIN) 300 mg capsule Take 2 capsules 1 hour before dental procedure 8/13/20  Yes Verenice Mejia MD   pantoprazole (PROTONIX) 40 mg tablet Take 40 mg by mouth daily. 11/21/19  Yes Provider, Historical   montelukast sodium (SINGULAIR PO) Take 10 mg by mouth.  10 mg tab   Yes Provider, Historical   doxycycline (VIBRAMYCIN) 100 mg capsule TAKE 1 (ONE) CAPSULE BY MOUTH TWICE DAILY WITH FOOD  Patient not taking: Reported on 12/12/2021 8/27/20   Provider, Historical     Allergies   Allergen Reactions    Metformin Diarrhea    Statins-Hmg-Coa Reductase Inhibitors Myalgia     Myalgia with  multiple statins  Takes pravachol     Codeine Rash     Rash on thighs    Darvon [Propoxyphene] Other (comments)     \"I see worms\"    Pcn [Penicillins] Rash    Sulfa (Sulfonamide Antibiotics) Rash      Social History     Tobacco Use    Smoking status: Never Smoker    Smokeless tobacco: Never Used   Substance Use Topics    Alcohol use: No      Family History   Problem Relation Age of Onset    Heart Disease Mother    [facility] Kidney Disease Mother     Lung Disease Mother         COPD    Diabetes Brother     Pacemaker Brother     Kidney Disease Brother     Hypertension Brother     Anesth Problems Neg Hx         Current Facility-Administered Medications   Medication Dose Route Frequency    methylPREDNISolone (PF) (SOLU-MEDROL) injection 40 mg  40 mg IntraVENous Q12H    benzonatate (TESSALON) capsule 200 mg  200 mg Oral TID    insulin glargine (LANTUS) injection 70 Units  70 Units SubCUTAneous DAILY    potassium chloride SR (KLOR-CON 10) tablet 10 mEq  10 mEq Oral BID    albuterol-ipratropium (DUO-NEB) 2.5 MG-0.5 MG/3 ML  3 mL Nebulization BID RT    buPROPion XL (WELLBUTRIN XL) tablet 150 mg  150 mg Oral DAILY    carvediloL (COREG) tablet 6.25 mg  6.25 mg Oral BID WITH MEALS    fenofibrate nanocrystallized (TRICOR) tablet 145 mg  145 mg Oral DAILY    glimepiride (AMARYL) tablet 4 mg  4 mg Oral ACB&D    hydroCHLOROthiazide (HYDRODIURIL) tablet 25 mg  25 mg Oral DAILY    pantoprazole (PROTONIX) tablet 40 mg  40 mg Oral DAILY    rosuvastatin (CRESTOR) tablet 20 mg  20 mg Oral QHS    sertraline (ZOLOFT) tablet 100 mg  100 mg Oral DAILY    sodium chloride (NS) flush 5-40 mL  5-40 mL IntraVENous Q8H    enoxaparin (LOVENOX) injection 40 mg  40 mg SubCUTAneous DAILY    insulin lispro (HUMALOG) injection   SubCUTAneous AC&HS    azithromycin (ZITHROMAX) tablet 500 mg  500 mg Oral DAILY    montelukast (SINGULAIR) tablet 10 mg  10 mg Oral QHS       Review of Systems:  Constitutional: positive for fevers, chills and fatigue  Eyes: negative  Ears, nose, mouth, throat, and face: positive for nasal congestion  Respiratory: positive for cough, sputum, hemoptysis, dyspnea on exertion or chronic bronchitis  Cardiovascular: negative  Gastrointestinal: positive for dysphagia  Genitourinary:negative  Integument/breast: negative  Hematologic/lymphatic: negative  Musculoskeletal:positive for myalgias  Neurological: negative  Behavioral/Psych: negative  Endocrine: negative  Allergic/Immunologic: positive for hay fever    Objective:   Vital Signs:    Visit Vitals  BP (!) 142/74 (BP 1 Location: Left upper arm, BP Patient Position: At rest)   Pulse 72   Temp 97.8 °F (36.6 °C)   Resp 25   Ht 5' 3\" (1.6 m)   Wt 90.4 kg (199 lb 4.7 oz)   SpO2 94%   BMI 35.30 kg/m²       O2 Device: None (Room air)   O2 Flow Rate (L/min): 1.5 l/min   Temp (24hrs), Av °F (36.7 °C), Min:97.5 °F (36.4 °C), Max:98.5 °F (36.9 °C)       Intake/Output:   Last shift:      No intake/output data recorded. Last 3 shifts:  1901 -  0700  In: 920 [P.O.:920]  Out: 1550 [Urine:1550]    Intake/Output Summary (Last 24 hours) at 2021 0819  Last data filed at 2021 0253  Gross per 24 hour   Intake 920 ml   Output 1550 ml   Net -630 ml      Physical Exam:   General:  Alert, cooperative, no distress, appears stated age. Lying in bed, seems to have better energy today. On 2L NC with pox of 95%. Conversant, Had moderate coughing when seen this am.    Head:  Normocephalic, without obvious abnormality, atraumatic. Eyes:  Conjunctivae/corneas clear. PERRL, EOMs intact. Nose: Nares normal. Septum midline. Mucosa normal. No drainage or sinus tenderness. Throat: Lips, mucosa, and tongue normal. Teeth and gums normal.   Neck: Supple, symmetrical, trachea midline, no adenopathy, thyroid: no enlargment/tenderness/nodules, no carotid bruit and no JVD. Back:   Symmetric, no curvature. ROM normal.   Lungs:   Bilateral basilar rales. Comfortable. Pretty clear anteriorly. Has frequent coughing when seen this am.    Chest wall:  No tenderness or deformity. Heart:  Regular rate and rhythm, S1, S2 normal, no murmur, click, rub or gallop. Abdomen:   Soft, non-tender. Bowel sounds normal. No masses,  No organomegaly. Extremities: Extremities normal, atraumatic, no cyanosis or edema. Pulses: 2+ and symmetric all extremities.    Skin: Skin color, texture, turgor normal. No rashes or lesions   Lymph nodes: Cervical, supraclavicular, and axillary nodes normal.   Neurologic: Grossly nonfocal, motor seems to be intact. Data review:     Recent Results (from the past 24 hour(s))   GLUCOSE, POC    Collection Time: 12/12/21 10:39 AM   Result Value Ref Range    Glucose (POC) 452 (H) 65 - 117 mg/dL    Performed by Vinh Burger    GLUCOSE, POC    Collection Time: 12/12/21 10:40 AM   Result Value Ref Range    Glucose (POC) 452 (H) 65 - 117 mg/dL    Performed by Shyanne Hill, POC    Collection Time: 12/12/21  4:08 PM   Result Value Ref Range    Glucose (POC) 273 (H) 65 - 117 mg/dL    Performed by Vinh Burger    GLUCOSE, POC    Collection Time: 12/12/21  8:59 PM   Result Value Ref Range    Glucose (POC) 352 (H) 65 - 117 mg/dL    Performed by Francis Gillette    GLUCOSE, POC    Collection Time: 12/13/21  7:54 AM   Result Value Ref Range    Glucose (POC) 247 (H) 65 - 117 mg/dL    Performed by Devan Valdez PCT        Imaging:  I have personally reviewed the patients radiographs and have reviewed the reports:  12/11/2021: FINDINGS: This is a good quality study for the evaluation of pulmonary embolism  to the first subsegmental arterial level. There is no pulmonary embolism to this  level.      THYROID: Left hemithyroidectomy. Stable 2.0 cm and 0.6 cm hypodense right  thyroid lobe nodules  CHEST WALL: Bilateral mastectomies. Chronically ruptured left breast implant. MEDIASTINUM: No mass or lymphadenopathy. MARIAN: No mass or lymphadenopathy. Several calcified right hilar lymph nodes. THORACIC AORTA: No aneurysm. Atherosclerosis. HEART: Normal in size. Scattered coronary artery calcifications. ESOPHAGUS: No wall thickening or dilatation. TRACHEA/BRONCHI: Patent. PLEURA: No effusion or pneumothorax. LUNGS: Widespread subtle subpleural fibrotic changes. Widespread scattered  patchy groundglass opacities and interlobular septal thickening.   UPPER ABDOMEN: Partially imaged. No acute pathology. BONES: No aggressive bone lesion or fracture. Partially visualized cervical and  thoracolumbar spine hardware. ? Renal osteodystrophy.     IMPRESSION  1. No pulmonary embolism. 2.  Widespread patchy groundglass opacities and interlobular septal thickening  may reflect multifocal atypical infection, inflammatory process, and/or edema.         Alison Alvarado MD

## 2021-12-13 NOTE — PROGRESS NOTES
INTERNAL MEDICINE PROGRESS NOTE    NAME:  Grace Strickland   :   1945   MRN:   [id number]     Date/Time:  2021 5:49 AM  Subjective:   History:  Chart reviewed and patient seen and examined and D/W her nurse this AM and all events noted. She has a history of severe interstitial lung disease, DM, HTN, Obesity, DJD and other chronic medical problems. She was admitted on  with acute on chronic respiratory failure due to acute bronchitis and DM out of control due to steroids which she was on PTA. This AM she feels much better than on admission with less SOB although currently at bedrest. She has a little cough with only clear sputum. She has no other cardiac or respiratory c/o. She has no GI/ c/o. She has no other c/o on complete ROS.       Medications reviewed:  Current Facility-Administered Medications   Medication Dose Route Frequency    insulin glargine (LANTUS) injection 70 Units  70 Units SubCUTAneous DAILY    potassium chloride SR (KLOR-CON 10) tablet 10 mEq  10 mEq Oral BID    albuterol-ipratropium (DUO-NEB) 2.5 MG-0.5 MG/3 ML  3 mL Nebulization BID RT    acetaminophen (TYLENOL) tablet 650 mg  650 mg Oral Q6H PRN    buPROPion XL (WELLBUTRIN XL) tablet 150 mg  150 mg Oral DAILY    carvediloL (COREG) tablet 6.25 mg  6.25 mg Oral BID WITH MEALS    fenofibrate nanocrystallized (TRICOR) tablet 145 mg  145 mg Oral DAILY    glimepiride (AMARYL) tablet 4 mg  4 mg Oral ACB&D    hydroCHLOROthiazide (HYDRODIURIL) tablet 25 mg  25 mg Oral DAILY    pantoprazole (PROTONIX) tablet 40 mg  40 mg Oral DAILY    rosuvastatin (CRESTOR) tablet 20 mg  20 mg Oral QHS    sertraline (ZOLOFT) tablet 100 mg  100 mg Oral DAILY    sodium chloride (NS) flush 5-40 mL  5-40 mL IntraVENous Q8H    sodium chloride (NS) flush 5-40 mL  5-40 mL IntraVENous PRN    acetaminophen (TYLENOL) tablet 650 mg  650 mg Oral Q6H PRN    Or    acetaminophen (TYLENOL) suppository 650 mg  650 mg Rectal Q6H PRN    polyethylene glycol (MIRALAX) packet 17 g  17 g Oral DAILY PRN    ondansetron (ZOFRAN ODT) tablet 4 mg  4 mg Oral Q8H PRN    Or    ondansetron (ZOFRAN) injection 4 mg  4 mg IntraVENous Q6H PRN    enoxaparin (LOVENOX) injection 40 mg  40 mg SubCUTAneous DAILY    insulin lispro (HUMALOG) injection   SubCUTAneous AC&HS    glucose chewable tablet 16 g  4 Tablet Oral PRN    dextrose (D50W) injection syrg 12.5-25 g  12.5-25 g IntraVENous PRN    glucagon (GLUCAGEN) injection 1 mg  1 mg IntraMUSCular PRN    azithromycin (ZITHROMAX) tablet 500 mg  500 mg Oral DAILY    montelukast (SINGULAIR) tablet 10 mg  10 mg Oral QHS    LORazepam (ATIVAN) tablet 1 mg  1 mg Oral QHS PRN    methylPREDNISolone (PF) (SOLU-MEDROL) injection 40 mg  40 mg IntraVENous Q8H    benzocaine-menthoL (CHLORASEPTIC MAX) lozenge 1 Lozenge  1 Lozenge Oral Q2H PRN        Objective:   Vitals:  Visit Vitals  /62   Pulse 70   Temp 98.2 °F (36.8 °C)   Resp 20   Ht 5' 3\" (1.6 m)   Wt 199 lb 4.7 oz (90.4 kg)   SpO2 96%   BMI 35.30 kg/m²    O2 Flow Rate (L/min): 1.5 l/min O2 Device: None (Room air) Temp (24hrs), Av.9 °F (36.6 °C), Min:97.5 °F (36.4 °C), Max:98.5 °F (36.9 °C)      Last 24hr Input/Output:    Intake/Output Summary (Last 24 hours) at 2021 0549  Last data filed at 2021 0253  Gross per 24 hour   Intake 920 ml   Output 1550 ml   Net -630 ml        PHYSICAL EXAM:  General:     Alert, cooperative, no distress, appears stated age. Head:    Normocephalic, without obvious abnormality, atraumatic. Eyes:    Conjunctivae/corneas clear. PERRLA  Nose:   Nares normal. No drainage or sinus tenderness. Throat:     Lips, mucosa, and tongue normal.  No Thrush  Neck:   Supple, symmetrical,  no adenopathy, thyroid: non tender     no carotid bruit and no JVD. Back:     Symmetric,  No CVA tenderness. Lungs:    Clear to auscultation bilaterally with mild decreased BS. No Wheezing or Rhonchi. No rales.   Heart:    Regular rate and rhythm,  no murmur, rub or gallop. Abdomen:    Soft, non-tender. Not distended. Bowel sounds normal. No masses  Extremities:  Extremities normal, atraumatic, No cyanosis. No edema. No clubbing  Lymph nodes:  Cervical, supraclavicular normal.  Neurologic:  Normal strength, Alert and oriented X 3.    Skin:                 No rash      Lab Data Reviewed:    Recent Results (from the past 24 hour(s))   GLUCOSE, POC    Collection Time: 12/12/21  6:25 AM   Result Value Ref Range    Glucose (POC) 322 (H) 65 - 117 mg/dL    Performed by [address] [place] [address], POC    Collection Time: 12/12/21  7:59 AM   Result Value Ref Range    Glucose (POC) 297 (H) 65 - 117 mg/dL    Performed by [address], POC    Collection Time: 12/12/21 10:39 AM   Result Value Ref Range    Glucose (POC) 452 (H) 65 - 117 mg/dL    Performed by Michelle Bello    GLUCOSE, POC    Collection Time: 12/12/21 10:40 AM   Result Value Ref Range    Glucose (POC) 452 (H) 65 - 117 mg/dL    Performed by Dia Caul, POC    Collection Time: 12/12/21  4:08 PM   Result Value Ref Range    Glucose (POC) 273 (H) 65 - 117 mg/dL    Performed by Dia Caul, POC    Collection Time: 12/12/21  8:59 PM   Result Value Ref Range    Glucose (POC) 352 (H) 65 - 117 mg/dL    Performed by Dany Reyes          Assessment/Plan:     Principal Problem:    Acute on chronic respiratory failure ([facility] (12/11/2021)    Active Problems:    Controlled type 2 diabetes mellitus with stage 2 chronic kidney disease, with long-term current use of insulin ([facility] [address] (4/20/2015)      Hypertension with renal disease (8/24/2017)      CKD (chronic kidney disease), stage II (8/24/2017)      Class 1 obesity due to excess calories without serious comorbidity with body mass index (BMI) of 33.0 to 33.9 in adult (4/20/2015)      ILD (interstitial lung disease) ([facility] [address] [facility] (12/11/2021)      Hyperglycemia due to diabetes mellitus ([facility] (12/11/2021) ___________________________________________________  PLAN:    1. Supplemental oxygen to keep Sat > 90%  2. Continue Solumedrol, but decrease to Q 12h  3. Continue Duoneb  4. Continue Zithromax to cover possible secondary infection  5. Increased Lantus for DM control and continue Glimperide  6. Follow BS and cover with SSI  7. Potassium supplementation with hypokalemia  8. Continue Protonix for PUD prevention  9. Continue home Singulair  10. Continue other home meds    45  Minutes spent today in direct care of this high complexity patient with greater than 50% in counseling and coordination of care.     If need to contact me use hospital  [phone], DO NOT USE PERFECT SERVE    ___________________________________________________    Attending Physician: Johny Arias MD

## 2021-12-13 NOTE — PROGRESS NOTES
Problem: Falls - Risk of  Goal: *Absence of Falls  Description: Document Seferino Pederson Fall Risk and appropriate interventions in the flowsheet. Outcome: Progressing Towards Goal  Note: Fall Risk Interventions:  Mobility Interventions: Bed/chair exit alarm, Communicate number of staff needed for ambulation/transfer         Medication Interventions: Bed/chair exit alarm, Evaluate medications/consider consulting pharmacy, Patient to call before getting OOB, Teach patient to arise slowly         History of Falls Interventions: Bed/chair exit alarm, Room close to nurse's station, Utilize gait belt for transfer/ambulation, Assess for delayed presentation/identification of injury for 48 hrs (comment for end date)         Problem: Patient Education: Go to Patient Education Activity  Goal: Patient/Family Education  Outcome: Progressing Towards Goal     Problem: Pressure Injury - Risk of  Goal: *Prevention of pressure injury  Description: Document Hari Scale and appropriate interventions in the flowsheet.   Outcome: Progressing Towards Goal  Note: Pressure Injury Interventions:  Sensory Interventions: Assess changes in LOC, Assess need for specialty bed, Avoid rigorous massage over bony prominences, Chair cushion, Check visual cues for pain, Discuss PT/OT consult with provider, Float heels, Keep linens dry and wrinkle-free    Moisture Interventions: Absorbent underpads, Apply protective barrier, creams and emollients, Assess need for specialty bed, Limit adult briefs    Activity Interventions: Assess need for specialty bed, Chair cushion, Increase time out of bed, Pressure redistribution bed/mattress(bed type), PT/OT evaluation    Mobility Interventions: Assess need for specialty bed, Chair cushion, Float heels, HOB 30 degrees or less, Pressure redistribution bed/mattress (bed type), PT/OT evaluation    Nutrition Interventions: Document food/fluid/supplement intake, Offer support with meals,snacks and hydration Problem: Patient Education: Go to Patient Education Activity  Goal: Patient/Family Education  Outcome: Progressing Towards Goal

## 2021-12-13 NOTE — PROGRESS NOTES
Transition of Care Plan:    RUR: 11%  Disposition: SNF placement (referrals pending)  Follow up appointments: PCP, specialists as indicated after rehab   DME needed: N/A, has a RW, rollator, 2 canes, raised toilet seat   Transportation at Discharge: medical transport   [facility] or means to access home:  yes      IM Medicare Letter: to be reviewed prior to d/c   Is patient a BCPI-A Bundle: No       If yes, was Bundle Letter given?:    Is patient a [place] and connected with the [place]? No  If yes, was [employer] transfer form completed and VA notified? Caregiver Contact: Quique Dupont (son) [phone]  Discharge Caregiver contacted prior to discharge? To be contacted prior to d/c    Reason for Admission:  ILD, acute on chronic respiratory failure                      RUR Score:  11%                 Plan for utilizing home health:   TBD, likely SNF        PCP: First and Last name:  Loulou Bailon MD     Name of Practice:    Are you a current patient: Yes/No: yes   Approximate date of last visit: 2 weeks ago    Can you participate in a virtual visit with your PCP: No                    Current Advanced Directive/Advance Care Plan: DNR    Advance Care Planning     General Advance Care Planning (ACP) Conversation    Date of Conversation: 12/13/21    Conducted with: Patient with Sharon 153:   No healthcare decision makers have been documented. Click here to complete [address] including selection of the Healthcare Decision Maker Relationship (ie \"Primary\")    Today we documented Decision Maker(s) consistent with ACP documents on file. Content/Action Overview:    Has ACP document(s) on file - reflects the patient's care preferences  Reviewed DNR/DNI and patient confirms current DNR status - completed forms on file (place new order if needed)    Length of Voluntary ACP Conversation in minutes:  <16 minutes (Non-Billable)    Jarad Nelson Transition of Care Plan: SNF placement    Chart reviewed. Consult received for possible home O2 and assistance with d/c planning. Met with pt at bedside this PM to introduce self and role. Prior to admission, pt resides alone in a two level home with a ramp to enter. Pt resides on first floor of the home. Pt has a  who could assist with some transportation needs. PCP is Dr. Serina Castillo. Preferred pharmacy is [facility]. Pt has a RW, rollator, 2 canes, and a raised toilet seat at home. She has been having increased difficulty with self-care at home due to coughing spells. Pt with hx of IPR stay at [address] [place] [address]. Pt with [facility] of [facility] with [facility]. She was most recently attending OP PT in [place] ([address] Pt has supportive son but he works often. Discussed recommendation for SNF placement at d/c. Pt is agreeable and prefers to stay in [place] area. Referrals sent to [facility] and [facility] [place] via [address] which are pending review. Pt does not have home O2. She is currently on room air during visit. Unit CM will continue to follow. Care Management Interventions  PCP Verified by CM:  Yes  Palliative Care Criteria Met (RRAT>21 & CHF Dx)?: No  Mode of Transport at Discharge: BLS  Transition of Care Consult (CM Consult): Discharge Planning  Discharge Durable Medical Equipment: No  Physical Therapy Consult: Yes  Occupational Therapy Consult: Yes  Speech Therapy Consult: No  Support Systems: Child(silas)  Confirm Follow Up Transport: Family  The Plan for Transition of Care is Related to the Following Treatment Goals : SNF  The Patient and/or Patient Representative was Provided with a Choice of Provider and Agrees with the Discharge Plan?: Yes  Freedom of Choice List was Provided with Basic Dialogue that Supports the Patient's Individualized Plan of Care/Goals, Treatment Preferences and Shares the Quality Data Associated with the Providers?: Yes  The [employer] Information Provided?: No  Discharge Location  Discharge Placement: [place] [id number] [facility], [address] [facility]  [phone]

## 2021-12-13 NOTE — PROGRESS NOTES
Problem: Mobility Impaired (Adult and Pediatric)  Goal: *Acute Goals and Plan of Care (Insert Text)  Description: FUNCTIONAL STATUS PRIOR TO ADMISSION: Patient was modified independent using a SB quad cane and single point cane for functional mobility. Also owns a RW but states her home is too small to use the RW inside. Does not drive anymore and amb mainly short distances secondary to breathing issues. Orders groceries but even finds act of putting groceries away to be exhausting. HOME SUPPORT PRIOR TO ADMISSION: The patient lived alone with no local support. Daughter lives in [place] and only other assistance in neighbor if absolutely needed per patient. Physical Therapy Goals  Initiated 12/13/2021  1. Patient will move from supine to sit and sit to supine  in bed with modified independence within 7 day(s). 2.  Patient will transfer from bed to chair and chair to bed with modified independence using the least restrictive device within 7 day(s). 3.  Patient will perform sit to stand with modified independence within 7 day(s). 4.  Patient will ambulate with modified independence for 100 feet with the least restrictive device within 7 day(s). Outcome: Progressing Towards Goal   PHYSICAL THERAPY EVALUATION  Patient: Ansel Lanes (14 y.o. female)  Date: 12/13/2021  Primary Diagnosis: Acute on chronic respiratory failure (HCC) [J96.20]  ILD (interstitial lung disease) ([facility] [J84.9]  Hyperglycemia due to diabetes mellitus ([facility] [E11.65]        Precautions:   Fall    ASSESSMENT  Based on the objective data described below, the patient presents with decreased functional mobility from baseline level of function. Patient currently limited by coughing, SOB, gait instability, impaired balance and decreased activity tolerance. Patient currently needing CGA for transfers and able to amb approx 30 feet with RW and CGA. Patient is generally unstable and unsafe with RW at times.   She fatigues quickly and needs rest breaks with activity. She lives alone and may benefit from short rehab stay pending progress. Do not anticipate patient will be agreeable to rehab and will likely [facility] (which she is interested in). Other factors to consider for discharge: at risk for falls, below baseline     Patient will benefit from skilled therapy intervention to address the above noted impairments. PLAN :  Recommendations and Planned Interventions: bed mobility training, transfer training, gait training, therapeutic exercises, neuromuscular re-education, patient and family training/education, and therapeutic activities      Frequency/Duration: Patient will be followed by physical therapy:  5 times a week to address goals. Recommendation for discharge: (in order for the patient to meet his/her long term goals)  Therapy up to 5 days/week in SNF setting. Do not anticipate patient will be agreeable to rehab. She is open to [facility] PT follow up at home. IF patient discharges home will need the following DME: patient owns DME required for discharge         SUBJECTIVE:   Patient stated I have really mainly just been on my back for months.     OBJECTIVE DATA SUMMARY:   HISTORY:    Past Medical History:   Diagnosis Date    Abnormal LFTs 08/24/2017    FATTY LIVER    Arthritis     OSTEO    Avascular necrosis of hip ([address] 08/24/2017    BILAT HIPS    Breast CA ([address] 1989, 2001    BILATERAL; SURGERY, CHEMO    Chronic pain     CKD (chronic kidney disease), stage II 8/24/2017    Coagulation disorder ([facility] 1984    ITP  (DR CHU BRADSHAW) - PLATELETS DROPPED TO 5K    Depression     Diabetes ([facility]     TYPE 2; NIDDM    DJD (degenerative joint disease), multiple sites 8/24/2017    GI bleed 2008    HEMORRHOIDS    Hyperlipemia     Hypertension with renal disease 8/24/2017    IBS (irritable bowel syndrome) 8/24/2017    Ill-defined condition 2015    PNEUMONIA X2 - HOSPITALIZED IN 2015    Interstitial lung disease ([facility] 04/01/2019    Liver disease     FATTY LIVER    Myalgia 8/24/2017    On statin therapy 8/24/2017    Overactive bladder 8/24/2017    Pneumonia 04/2015    HOSPITALIZED 3 WEEKS. Polymyalgia rheumatica ([facility] 8/24/2017    Prophylactic antibiotic 8/24/2017    Reflex abnormality     acid reflex    Rosacea      Past Surgical History:   Procedure Laterality Date    HX APPENDECTOMY  1950    HX BREAST BIOPSY Bilateral     HX CATARACT REMOVAL Bilateral     HX COLONOSCOPY      HX ENDOSCOPY      HX GI      COLONOSCOPY    HX HEENT      WISDOM TEETH    HX HYSTERECTOMY  2000S    HX MASTECTOMY Right 1989    HX MASTECTOMY Left 2001    HX ORTHOPAEDIC  2008    LUMBAR FUSION    HX ORTHOPAEDIC  2018    RE-DO LUMBAR FUSION, AND ADDED THORACIC FUSION    HX ORTHOPAEDIC  03/26/2019    neckectomy    HX TUBAL LIGATION  1981    HX UROLOGICAL  2000s    BLADDER SLING    CT BREAST SURGERY PROCEDURE UNLISTED  1993, 2002    RECONSTRUCTION X2    CT TOTAL HIP ARTHROPLASTY Left 11/16/2016    ANTERIOR APPROACH, DR Gwendolyn De La Torre; (POSTOP: STANDS ONE INCH TALLER ON LEFT FOOT)    CT TOTAL HIP ARTHROPLASTY Right 12/2016    ANTERIOR APPROACH (DR Gwendolyn De La Torre)       Personal factors and/or comorbidities impacting plan of care:     Home Situation  Home Environment: Private residence  Wheelchair Ramp: Yes  One/Two Story Residence: Two story, live on 1st floor  Living Alone: Yes  Support Systems: No [place]  Patient Expects to be Discharged to[de-identified] House  Current DME Used/Available at Home: Cane, quad, [employer], straight, Shower chair, Walker, rolling  Tub or Shower Type: Shower    EXAMINATION/PRESENTATION/DECISION MAKING:   Critical Behavior:  Neurologic State: Alert  Orientation Level: Oriented X4  Cognition: Appropriate decision making, Follows commands     Hearing:   Auditory  Auditory Impairment: None    Range Of Motion:  AROM: Generally decreased, functional                       Strength:    Strength: Generally decreased, functional       Functional Mobility:  Bed Mobility:      Not tested, patient sitting EOB upon arrival        Transfers:  Sit to Stand: Contact guard assistance  Stand to Sit: Contact guard assistance                       Balance:   Sitting: Intact  Standing: Impaired  Standing - Static: Constant support; Good  Standing - Dynamic : Constant support; Fair  Ambulation/Gait Training:  Distance (ft): 30 Feet (ft)  Assistive Device: Gait belt; Walker, rolling  Ambulation - Level of Assistance: Contact guard assistance; Assist x1     Gait Description (WDL): Exceptions to WDL  Gait Abnormalities: Decreased step clearance; Shuffling gait        Base of Support: Widened     Speed/Vivian: Pace decreased (<100 feet/min); Shuffled; Slow  Step Length: Left shortened; Right shortened             Pain Rating:  No c/o pain    Activity Tolerance:   Fair and requires rest breaks    After treatment patient left in no apparent distress:   Sitting in chair and Call bell within reach    COMMUNICATION/EDUCATION:   The patients plan of care was discussed with: Physical therapist, Occupational therapist, and Registered nurse. Fall prevention education was provided and the patient/caregiver indicated understanding., Patient/family have participated as able in goal setting and plan of care. , and Patient/family agree to work toward stated goals and plan of care.     Thank you for this referral.  Eduardo Daily, PT, DPT   Time Calculation: 18 mins

## 2021-12-13 NOTE — PROGRESS NOTES
Received notification from bedside RN about patient with regards to: , needs Lispro coverage    Intervention given: Lispro 6 units SQ x 1 dose ordered

## 2021-12-14 LAB
ANION GAP SERPL CALC-SCNC: 6 MMOL/L (ref 5–15)
APPEARANCE UR: CLEAR
BACTERIA URNS QL MICRO: NEGATIVE /HPF
BILIRUB UR QL: NEGATIVE
BUN SERPL-MCNC: 30 MG/DL (ref 6–20)
BUN/CREAT SERPL: 33 (ref 12–20)
CALCIUM SERPL-MCNC: 9.1 MG/DL (ref 8.5–10.1)
CHLORIDE SERPL-SCNC: 101 MMOL/L (ref 97–108)
CO2 SERPL-SCNC: 28 MMOL/L (ref 21–32)
COLOR UR: ABNORMAL
CREAT SERPL-MCNC: 0.92 MG/DL (ref 0.55–1.02)
EPITH CASTS URNS QL MICRO: ABNORMAL /LPF
ERYTHROCYTE [DISTWIDTH] IN BLOOD BY AUTOMATED COUNT: 12.8 % (ref 11.5–14.5)
GLUCOSE BLD STRIP.AUTO-MCNC: 245 MG/DL (ref 65–117)
GLUCOSE BLD STRIP.AUTO-MCNC: 246 MG/DL (ref 65–117)
GLUCOSE BLD STRIP.AUTO-MCNC: 257 MG/DL (ref 65–117)
GLUCOSE BLD STRIP.AUTO-MCNC: 278 MG/DL (ref 65–117)
GLUCOSE BLD STRIP.AUTO-MCNC: 322 MG/DL (ref 65–117)
GLUCOSE SERPL-MCNC: 294 MG/DL (ref 65–100)
GLUCOSE UR STRIP.AUTO-MCNC: 500 MG/DL
HCT VFR BLD AUTO: 41.7 % (ref 35–47)
HGB BLD-MCNC: 14.2 G/DL (ref 11.5–16)
HGB UR QL STRIP: NEGATIVE
KETONES UR QL STRIP.AUTO: NEGATIVE MG/DL
LEUKOCYTE ESTERASE UR QL STRIP.AUTO: ABNORMAL
MCH RBC QN AUTO: 33.2 PG (ref 26–34)
MCHC RBC AUTO-ENTMCNC: 34.1 G/DL (ref 30–36.5)
MCV RBC AUTO: 97.4 FL (ref 80–99)
NITRITE UR QL STRIP.AUTO: NEGATIVE
NRBC # BLD: 0 K/UL (ref 0–0.01)
NRBC BLD-RTO: 0 PER 100 WBC
PH UR STRIP: 6 [PH] (ref 5–8)
PLATELET # BLD AUTO: 242 K/UL (ref 150–400)
PMV BLD AUTO: 10.2 FL (ref 8.9–12.9)
POTASSIUM SERPL-SCNC: 3.6 MMOL/L (ref 3.5–5.1)
PROT UR STRIP-MCNC: NEGATIVE MG/DL
RBC # BLD AUTO: 4.28 M/UL (ref 3.8–5.2)
RBC #/AREA URNS HPF: ABNORMAL /HPF (ref 0–5)
SERVICE CMNT-IMP: ABNORMAL
SODIUM SERPL-SCNC: 135 MMOL/L (ref 136–145)
SP GR UR REFRACTOMETRY: 1.02 (ref 1–1.03)
UA: UC IF INDICATED,UAUC: ABNORMAL
UROBILINOGEN UR QL STRIP.AUTO: 0.2 EU/DL (ref 0.2–1)
WBC # BLD AUTO: 6 K/UL (ref 3.6–11)
WBC URNS QL MICRO: ABNORMAL /HPF (ref 0–4)

## 2021-12-14 PROCEDURE — 80048 BASIC METABOLIC PNL TOTAL CA: CPT

## 2021-12-14 PROCEDURE — 74011636637 HC RX REV CODE- 636/637: Performed by: INTERNAL MEDICINE

## 2021-12-14 PROCEDURE — 99233 SBSQ HOSP IP/OBS HIGH 50: CPT | Performed by: INTERNAL MEDICINE

## 2021-12-14 PROCEDURE — 65660000000 HC RM CCU STEPDOWN

## 2021-12-14 PROCEDURE — 74011250637 HC RX REV CODE- 250/637: Performed by: INTERNAL MEDICINE

## 2021-12-14 PROCEDURE — 97166 OT EVAL MOD COMPLEX 45 MIN: CPT | Performed by: OCCUPATIONAL THERAPIST

## 2021-12-14 PROCEDURE — 74011636637 HC RX REV CODE- 636/637: Performed by: PHYSICIAN ASSISTANT

## 2021-12-14 PROCEDURE — 94640 AIRWAY INHALATION TREATMENT: CPT

## 2021-12-14 PROCEDURE — 74011250637 HC RX REV CODE- 250/637: Performed by: FAMILY MEDICINE

## 2021-12-14 PROCEDURE — 74011250636 HC RX REV CODE- 250/636: Performed by: INTERNAL MEDICINE

## 2021-12-14 PROCEDURE — 97530 THERAPEUTIC ACTIVITIES: CPT

## 2021-12-14 PROCEDURE — 74011000250 HC RX REV CODE- 250: Performed by: INTERNAL MEDICINE

## 2021-12-14 PROCEDURE — 97110 THERAPEUTIC EXERCISES: CPT

## 2021-12-14 PROCEDURE — 97116 GAIT TRAINING THERAPY: CPT

## 2021-12-14 PROCEDURE — 85027 COMPLETE CBC AUTOMATED: CPT

## 2021-12-14 PROCEDURE — 82962 GLUCOSE BLOOD TEST: CPT

## 2021-12-14 PROCEDURE — 97535 SELF CARE MNGMENT TRAINING: CPT | Performed by: OCCUPATIONAL THERAPIST

## 2021-12-14 PROCEDURE — 2709999900 HC NON-CHARGEABLE SUPPLY

## 2021-12-14 RX ORDER — PREDNISONE 20 MG/1
40 TABLET ORAL 2 TIMES DAILY WITH MEALS
Status: DISCONTINUED | OUTPATIENT
Start: 2021-12-14 | End: 2021-12-16 | Stop reason: HOSPADM

## 2021-12-14 RX ORDER — INSULIN GLARGINE 100 [IU]/ML
80 INJECTION, SOLUTION SUBCUTANEOUS DAILY
Status: DISCONTINUED | OUTPATIENT
Start: 2021-12-14 | End: 2021-12-15

## 2021-12-14 RX ADMIN — Medication 10 ML: at 05:51

## 2021-12-14 RX ADMIN — CARVEDILOL 6.25 MG: 6.25 TABLET, FILM COATED ORAL at 17:42

## 2021-12-14 RX ADMIN — INSULIN LISPRO 4 UNITS: 100 INJECTION, SOLUTION INTRAVENOUS; SUBCUTANEOUS at 21:20

## 2021-12-14 RX ADMIN — CARVEDILOL 6.25 MG: 6.25 TABLET, FILM COATED ORAL at 08:53

## 2021-12-14 RX ADMIN — FENOFIBRATE 145 MG: 145 TABLET ORAL at 08:53

## 2021-12-14 RX ADMIN — HYDROCHLOROTHIAZIDE 25 MG: 25 TABLET ORAL at 08:53

## 2021-12-14 RX ADMIN — INSULIN LISPRO 6 UNITS: 100 INJECTION, SOLUTION INTRAVENOUS; SUBCUTANEOUS at 12:06

## 2021-12-14 RX ADMIN — BENZONATATE 200 MG: 100 CAPSULE ORAL at 08:53

## 2021-12-14 RX ADMIN — BENZONATATE 200 MG: 100 CAPSULE ORAL at 17:42

## 2021-12-14 RX ADMIN — BUPROPION HYDROCHLORIDE 150 MG: 150 TABLET, EXTENDED RELEASE ORAL at 08:53

## 2021-12-14 RX ADMIN — MONTELUKAST 10 MG: 10 TABLET, FILM COATED ORAL at 21:21

## 2021-12-14 RX ADMIN — INSULIN GLARGINE 80 UNITS: 100 INJECTION, SOLUTION SUBCUTANEOUS at 08:57

## 2021-12-14 RX ADMIN — METHYLPREDNISOLONE SODIUM SUCCINATE 40 MG: 40 INJECTION, POWDER, FOR SOLUTION INTRAMUSCULAR; INTRAVENOUS at 08:53

## 2021-12-14 RX ADMIN — BENZONATATE 200 MG: 100 CAPSULE ORAL at 21:21

## 2021-12-14 RX ADMIN — INSULIN LISPRO 6 UNITS: 100 INJECTION, SOLUTION INTRAVENOUS; SUBCUTANEOUS at 08:53

## 2021-12-14 RX ADMIN — POTASSIUM CHLORIDE 10 MEQ: 750 TABLET, FILM COATED, EXTENDED RELEASE ORAL at 17:42

## 2021-12-14 RX ADMIN — POTASSIUM CHLORIDE 10 MEQ: 750 TABLET, FILM COATED, EXTENDED RELEASE ORAL at 08:53

## 2021-12-14 RX ADMIN — ROSUVASTATIN 20 MG: 20 TABLET, FILM COATED ORAL at 21:21

## 2021-12-14 RX ADMIN — INSULIN LISPRO 10 UNITS: 100 INJECTION, SOLUTION INTRAVENOUS; SUBCUTANEOUS at 17:41

## 2021-12-14 RX ADMIN — PANTOPRAZOLE SODIUM 40 MG: 40 TABLET, DELAYED RELEASE ORAL at 08:53

## 2021-12-14 RX ADMIN — GLIMEPIRIDE 4 MG: 4 TABLET ORAL at 17:41

## 2021-12-14 RX ADMIN — AZITHROMYCIN 500 MG: 500 TABLET, FILM COATED ORAL at 08:53

## 2021-12-14 RX ADMIN — PREDNISONE 40 MG: 20 TABLET ORAL at 17:42

## 2021-12-14 RX ADMIN — IPRATROPIUM BROMIDE AND ALBUTEROL SULFATE 3 ML: .5; 3 SOLUTION RESPIRATORY (INHALATION) at 19:56

## 2021-12-14 RX ADMIN — IPRATROPIUM BROMIDE AND ALBUTEROL SULFATE 3 ML: .5; 3 SOLUTION RESPIRATORY (INHALATION) at 07:43

## 2021-12-14 RX ADMIN — GLIMEPIRIDE 4 MG: 4 TABLET ORAL at 08:52

## 2021-12-14 RX ADMIN — Medication 10 ML: at 21:20

## 2021-12-14 RX ADMIN — ENOXAPARIN SODIUM 40 MG: 100 INJECTION SUBCUTANEOUS at 08:57

## 2021-12-14 RX ADMIN — SERTRALINE 100 MG: 50 TABLET, FILM COATED ORAL at 08:53

## 2021-12-14 NOTE — PROGRESS NOTES
Transition of Care Plan:     RUR: 13% low risk  Disposition: SNF placement (referrals pending)  Follow up appointments: PCP, specialists as indicated after rehab   DME needed: N/A, has a RW, rollator, 2 canes, raised toilet seat   Transportation at Discharge: medical transport   [place] or means to access home:  yes      IM Medicare Letter: to be reviewed prior to d/c   Is patient a BCPI-A Bundle: No                  If yes, was Bundle Letter given?:    Is patient a [place] and connected with the [place]? No  If yes, was [employer] transfer form completed and VA notified? Caregiver Contact: Roxy Rodriguez (son) [phone]  Discharge Caregiver contacted prior to discharge? To be contacted prior to d/c    Update 1450: This pt has been accepted by [facility] and [facility]. Pt is concerned about a [facility] day pass. Message left with [facility] and Moira Clement from [facility]. Update 1130:  CM spoke with Moira Clement from [facility]. She will f/u for acceptance and bed availability. Initial note:  Chart reviewed. Message left via [employer] re: Acceptance. Awaiting response. CM to continue to monitor for d/c needs.     Keenan Blackwood, MSN  Care Manager  [phone]

## 2021-12-14 NOTE — PROGRESS NOTES
Problem: Self Care Deficits Care Plan (Adult)  Goal: *Acute Goals and Plan of Care (Insert Text)  Description: FUNCTIONAL STATUS PRIOR TO ADMISSION: Progressive recent decline, client previously was living independently. She was receiving outpatient physical therapy, ambulating using a quad cane and single point cane in the home, getting groceries delivered but recently struggling to put them away, difficulty with managing mediations due to medication changes and keeping things organized, incontient of urine at times and has frequent accidents which cause her to do multiple loads of laundry which has been a struggle    HOME SUPPORT PRIOR TO ADMISSION: The patient lived alone with son to provide assistance. Occupational Therapy Goals:  Initiated 12/14/2021  1. Patient will perform toileting with modified independence within 7 days. 2. Patient will perform lower body dressing with modified independence within 7 days. 3. Patient will perform grooming with modified independence within 7 days. 4. Patient will demonstrate modified independence with light ADL task within 7 days. 5. Patient will transfer from toilet with modified independence using the least restrictive device and appropriate durable medical equipment within 7 days. Outcome: Progressing Towards Goal   OCCUPATIONAL THERAPY EVALUATION  Patient: Barbi Mulligan (43 y.o. female)  Date: 12/14/2021  Primary Diagnosis: Acute on chronic respiratory failure (HCC) [J96.20]  ILD (interstitial lung disease) ([facility] [J84.9]  Hyperglycemia due to diabetes mellitus ([facility] [E11.65]        Precautions:   DNR, Fall    ASSESSMENT  Based on the objective data described below, the patient presents with decreased endurance and dynamic stability with activity tolerance due to recent decline in independence. When occupational therapy walked into Pt room she was seated in the drop arm chair next to her bed.  Pt demonstrated fair UE ROM with limitations in right shoulder internal rotation and adduction. Pt stated that she has \"frozen shoulder\" and has a desire to improve AROM in RUE. Pt required CGA when completing sit to stand transfer from the chair and while ambulating around the room with significant sway and reaching for objects without assist devices. Balance improved with support of RW. Pt reports that her walkers do not fit in her home and she uses SPC or quad cane at baseline. Her O2 was stable on room air but respiratory was in mid 30's with minimal activity. Pt required CGA when transferring to the toilet but demonstrated good insight with safety. Overall pt needs CGA for light ADLS and mobility. She lives alone with limited support and endorses recent decline in function with inability to perform IADLS in the home, drive and perform ADLS. She would benefit from short term SNF for rehab but would ultimately benefit from EDDIE or increased assist as her respiratory condition worsens over time due to ILD. Current Level of Function Impacting Discharge (ADLs/self-care): Contact guard for ADL and IADL with RW    Functional Outcome Measure: The patient scored 50 on the barthel outcome measure which is indicative of a need for OT      Other factors to consider for discharge:      Patient will benefit from skilled therapy intervention to address the above noted impairments. PLAN :  Recommendations and Planned Interventions: self care training, functional mobility training, therapeutic exercise, balance training, endurance activities, patient education, and home safety training    Frequency/Duration: Patient will be followed by occupational therapy 3 times a week to address goals.     Recommendation for discharge: (in order for the patient to meet his/her long term goals)  Therapy up to 5 days/week in SNF setting    This discharge recommendation:  Has not yet been discussed the attending provider and/or case management    IF patient discharges home will need the following DME: AE: long handled bathing, AE: long handled dressing, and patient owns DME required for discharge       SUBJECTIVE:   Patient stated it has been a struggle at home being alone and having to do everything for myself, I want to be more independent.     OBJECTIVE DATA SUMMARY:   HISTORY:   Past Medical History:   Diagnosis Date    Abnormal LFTs 08/24/2017    FATTY LIVER    Arthritis     OSTEO    Avascular necrosis of hip ([address] 08/24/2017    BILAT HIPS    Breast CA ([address] 1989, 2001    BILATERAL; SURGERY, CHEMO    Chronic pain     CKD (chronic kidney disease), stage II 8/24/2017    Coagulation disorder ([address] 1984    ITP  (DR CHU BRADSHAW) - PLATELETS DROPPED TO 5K    Depression     Diabetes ([address]     TYPE 2; NIDDM    DJD (degenerative joint disease), multiple sites 8/24/2017    GI bleed 2008    HEMORRHOIDS    Hyperlipemia     Hypertension with renal disease 8/24/2017    IBS (irritable bowel syndrome) 8/24/2017    Ill-defined condition 2015    PNEUMONIA X2 - HOSPITALIZED IN 2015    Interstitial lung disease ([address] 04/01/2019    Liver disease     FATTY LIVER    Myalgia 8/24/2017    On statin therapy 8/24/2017    Overactive bladder 8/24/2017    Pneumonia 04/2015    HOSPITALIZED 3 WEEKS.     Polymyalgia rheumatica ([address] 8/24/2017    Prophylactic antibiotic 8/24/2017    Reflex abnormality     acid reflex    Rosacea      Past Surgical History:   Procedure Laterality Date    HX APPENDECTOMY  1950    HX BREAST BIOPSY Bilateral     HX CATARACT REMOVAL Bilateral     HX COLONOSCOPY      HX ENDOSCOPY      HX GI      COLONOSCOPY    HX HEENT      WISDOM TEETH    HX HYSTERECTOMY  2000S    HX MASTECTOMY Right 1989    HX MASTECTOMY Left 2001    HX ORTHOPAEDIC  2008    LUMBAR FUSION    HX ORTHOPAEDIC  2018    RE-DO LUMBAR FUSION, AND ADDED THORACIC FUSION    HX ORTHOPAEDIC  03/26/2019    neckectomy    HX TUBAL LIGATION  1981    HX UROLOGICAL  2000s    BLADDER SLING    IL BREAST SURGERY PROCEDURE UNLISTED  1993, 2002 RECONSTRUCTION X2    CO TOTAL HIP ARTHROPLASTY Left 11/16/2016    ANTERIOR APPROACH, DR Patrice Tejada; (POSTOP: STANDS ONE INCH TALLER ON LEFT FOOT)    CO TOTAL HIP ARTHROPLASTY Right 12/2016    ANTERIOR APPROACH (DR Patrice Tejada)       Expanded or extensive additional review of patient history:     Home Situation  Home Environment: Private residence  # Steps to Enter: 0  Wheelchair Ramp: Yes  One/Two Story Residence: Two story, live on 1st floor  Living Alone: Yes  Support Systems: Child(silas) (son but not local )  Patient Expects to be Discharged to[de-identified] Skilled nursing facility  Current DME Used/Available at Home: Max Fidel, quad, Max Fidel, straight, Shower chair, Walker, rolling, Adaptive dressing aides (Sock Aid )  Tub or Shower Type: Shower    Hand dominance: Right    EXAMINATION OF PERFORMANCE DEFICITS:  Cognitive/Behavioral Status:  Neurologic State: Alert; Appropriate for age  Orientation Level: Oriented X4; Appropriate for age  Cognition: Appropriate decision making; Appropriate for age attention/concentration; Appropriate safety awareness; Recognition of people/places  Perception: Appears intact  Perseveration: No perseveration noted  Safety/Judgement: Awareness of environment      Hearing: Auditory  Auditory Impairment: None    Vision/Perceptual:                                     Range of Motion:    AROM: Generally decreased, functional  PROM: Within functional limits                      Strength:    Strength: Within functional limits                Coordination:  Coordination: Within functional limits  Fine Motor Skills-Upper: Right Intact; Left Intact    Gross Motor Skills-Upper: Right Intact; Left Intact        Balance:  Sitting: Without support  Standing: With support  Standing - Static: Constant support;  Fair  Standing - Dynamic : Fair; Constant support    Functional Mobility and Transfers for ADLs:  Bed Mobility:   Sit to supine CGA    Transfers:  Sit to Stand: Contact guard assistance; Assist x1  Stand to Sit: Contact guard assistance; Assist x1  Toilet Transfer : Contact guard assistance    ADL Assessment:  Feeding: Independent    Oral Facial Hygiene/Grooming: Contact guard assistance    Bathing: Contact guard assistance    Upper Body Dressing: Contact guard assistance    Lower Body Dressing: Contact guard assistance    Toileting: Contact guard assistance        Medication Management: Setup       ADL Intervention and task modifications:       Grooming  Grooming Assistance: Contact guard assistance  Washing Hands: Contact guard assistance (standing at the sink)    Pt reports difficulty with medication management at home and uses a spread sheet but has been having difficulty with this method. Recommended pt perusing pill packs. Toileting  Toileting Assistance: Contact guard assistance  Bladder Hygiene: Contact guard assistance  Bowel Hygiene: Contact guard assistance  Clothing Management: Contact guard assistance    Cognitive Retraining  Safety/Judgement: Awareness of environment      Functional Measure:    Barthel Index:  Bathin  Bladder: 5  Bowels: 5  Groomin  Dressin  Feeding: 10  Mobility: 5  Stairs: 0  Toilet Use: 5  Transfer (Bed to Chair and Back): 10  Total: 50/100      The Barthel ADL Index: Guidelines  1. The index should be used as a record of what a patient does, not as a record of what a patient could do. 2. The main aim is to establish degree of independence from any help, physical or verbal, however minor and for whatever reason. 3. The need for supervision renders the patient not independent. 4. A patient's performance should be established using the best available evidence. Asking the patient, friends/relatives and nurses are the usual sources, but direct observation and common sense are also important. However direct testing is not needed. 5. Usually the patient's performance over the preceding 24-48 hours is important, but occasionally longer periods will be relevant.   6. Middle categories imply that the patient supplies over 50 per cent of the effort. 7. Use of aids to be independent is allowed. Score Interpretation (from [facility])    Independent   60-79 Minimally independent   40-59 Partially dependent   20-39 Very dependent   <20 Totally dependent     -Tami Kohli., Barthel, D.W. (1965). Functional evaluation: the Barthel Index. [address] ([address] (). The Barthel activities of daily living index: self-reporting versus actual performance in the old (> or = 75 years). Journal of [address] [place] [address] 45(7), [facility], JRASHAWN, Jovanny Oneil., Valdez Romero. (1999). Measuring the change in disability after inpatient rehabilitation; comparison of the responsiveness of the Barthel Index and Functional [place] Measure. Journal of Neurology, Neurosurgery, and Psychiatry, 66(4), 736-387. Anjali Billy, N.J.A, CRICKET Castellanos, & Maya Bustillo, MRodneyA. (2004) Assessment of post-stroke quality of life in cost-effectiveness studies: The usefulness of the Barthel Index and the EuroQoL-5D.  Quality of Life Research, 15, 820-92         Occupational Therapy Evaluation Charge Determination   History Examination Decision-Making   MEDIUM Complexity : Expanded review of history including physical, cognitive and psychosocial  history  MEDIUM Complexity : 3-5 performance deficits relating to physical, cognitive , or psychosocial skils that result in activity limitations and / or participation restrictions LOW Complexity : No comorbidities that affect functional and no verbal or physical assistance needed to complete eval tasks       Based on the above components, the patient evaluation is determined to be of the following complexity level: LOW   Pain Ratin/10    Activity Tolerance:   Fair, requires rest breaks, and observed SOB with activity    After treatment patient left in no apparent distress:    Supine in bed, Call bell within reach, and Side rails x 3    COMMUNICATION/EDUCATION:   The patients plan of care was discussed with: Physical therapist and Registered nurse. Home safety education was provided and the patient/caregiver indicated understanding., Patient/family have participated as able in goal setting and plan of care. , and Patient/family agree to work toward stated goals and plan of care. This patients plan of care is appropriate for delegation to [facility].     Thank you for this referral.  Chacho French OTR/L  Time Calculation: 32 mins

## 2021-12-14 NOTE — PROGRESS NOTES
INTERNAL MEDICINE PROGRESS NOTE    NAME:  David Negro   :   1945   MRN:   [id number]     Date/Time:  2021 5:57 AM  Subjective:   History:  Chart reviewed and patient seen and examined and D/W her nurse this AM and all events noted. She has a history of severe interstitial lung disease, DM, HTN, Obesity, DJD and other chronic medical problems. She was admitted on  with acute on chronic respiratory failure due to acute bronchitis and DM out of control due to steroids which she was on PTA. This AM she feels better than on admission with less SOB although currently at bedrest. She walked some yesterday but very weak and needs SNF Rehab. She has a little cough with only clear sputum. She has no other cardiac or respiratory c/o. She has no GI/ c/o. She has no other c/o on complete ROS.       Medications reviewed:  Current Facility-Administered Medications   Medication Dose Route Frequency    methylPREDNISolone (PF) (SOLU-MEDROL) injection 40 mg  40 mg IntraVENous Q12H    benzonatate (TESSALON) capsule 200 mg  200 mg Oral TID    insulin glargine (LANTUS) injection 70 Units  70 Units SubCUTAneous DAILY    potassium chloride SR (KLOR-CON 10) tablet 10 mEq  10 mEq Oral BID    albuterol-ipratropium (DUO-NEB) 2.5 MG-0.5 MG/3 ML  3 mL Nebulization BID RT    acetaminophen (TYLENOL) tablet 650 mg  650 mg Oral Q6H PRN    buPROPion XL (WELLBUTRIN XL) tablet 150 mg  150 mg Oral DAILY    carvediloL (COREG) tablet 6.25 mg  6.25 mg Oral BID WITH MEALS    fenofibrate nanocrystallized (TRICOR) tablet 145 mg  145 mg Oral DAILY    glimepiride (AMARYL) tablet 4 mg  4 mg Oral ACB&D    hydroCHLOROthiazide (HYDRODIURIL) tablet 25 mg  25 mg Oral DAILY    pantoprazole (PROTONIX) tablet 40 mg  40 mg Oral DAILY    rosuvastatin (CRESTOR) tablet 20 mg  20 mg Oral QHS    sertraline (ZOLOFT) tablet 100 mg  100 mg Oral DAILY    sodium chloride (NS) flush 5-40 mL  5-40 mL IntraVENous Q8H    sodium chloride (NS) flush 5-40 mL  5-40 mL IntraVENous PRN    acetaminophen (TYLENOL) tablet 650 mg  650 mg Oral Q6H PRN    Or    acetaminophen (TYLENOL) suppository 650 mg  650 mg Rectal Q6H PRN    polyethylene glycol (MIRALAX) packet 17 g  17 g Oral DAILY PRN    ondansetron (ZOFRAN ODT) tablet 4 mg  4 mg Oral Q8H PRN    Or    ondansetron (ZOFRAN) injection 4 mg  4 mg IntraVENous Q6H PRN    enoxaparin (LOVENOX) injection 40 mg  40 mg SubCUTAneous DAILY    insulin lispro (HUMALOG) injection   SubCUTAneous AC&HS    glucose chewable tablet 16 g  4 Tablet Oral PRN    dextrose (D50W) injection syrg 12.5-25 g  12.5-25 g IntraVENous PRN    glucagon (GLUCAGEN) injection 1 mg  1 mg IntraMUSCular PRN    azithromycin (ZITHROMAX) tablet 500 mg  500 mg Oral DAILY    montelukast (SINGULAIR) tablet 10 mg  10 mg Oral QHS    LORazepam (ATIVAN) tablet 1 mg  1 mg Oral QHS PRN    benzocaine-menthoL (CHLORASEPTIC MAX) lozenge 1 Lozenge  1 Lozenge Oral Q2H PRN        Objective:   Vitals:  Visit Vitals  /73   Pulse 73   Temp 98.4 °F (36.9 °C)   Resp 20   Ht 5' 3\" (1.6 m)   Wt 199 lb 4.7 oz (90.4 kg)   SpO2 93%   BMI 35.30 kg/m²    O2 Flow Rate (L/min): 1.5 l/min O2 Device: None (Room air) Temp (24hrs), Av.2 °F (36.8 °C), Min:97.4 °F (36.3 °C), Max:98.8 °F (37.1 °C)      Last 24hr Input/Output:    Intake/Output Summary (Last 24 hours) at 2021 0557  Last data filed at 2021 0248  Gross per 24 hour   Intake 700 ml   Output 200 ml   Net 500 ml        PHYSICAL EXAM:  General:     Alert, cooperative, no distress, appears stated age. Head:    Normocephalic, without obvious abnormality, atraumatic. Eyes:    Conjunctivae/corneas clear. PERRLA  Nose:   Nares normal. No drainage or sinus tenderness. Throat:     Lips, mucosa, and tongue normal.  No Thrush  Neck:   Supple, symmetrical,  no adenopathy, thyroid: non tender     no carotid bruit and no JVD. Back:     Symmetric,  No CVA tenderness.   Lungs:    Clear to auscultation bilaterally with mild decreased BS. No Wheezing or Rhonchi. No rales. Heart:    Regular rate and rhythm,  no murmur, rub or gallop. Abdomen:    Soft, non-tender. Not distended. Bowel sounds normal. No masses  Extremities:  Extremities normal, atraumatic, No cyanosis. No edema. No clubbing  Lymph nodes:  Cervical, supraclavicular normal.  Neurologic:  General decreased strength, Alert and oriented X 3.    Skin:                 No rash      Lab Data Reviewed:    Recent Results (from the past 24 hour(s))   GLUCOSE, POC    Collection Time: 12/13/21  7:54 AM   Result Value Ref Range    Glucose (POC) 247 (H) 65 - 117 mg/dL    Performed by [facility] PCT    GLUCOSE, POC    Collection Time: 12/13/21 11:14 AM   Result Value Ref Range    Glucose (POC) 375 (H) 65 - 117 mg/dL    Performed by [facility] PCT    GLUCOSE, POC    Collection Time: 12/13/21  4:50 PM   Result Value Ref Range    Glucose (POC) 289 (H) 65 - 117 mg/dL    Performed by [facility] PCT    GLUCOSE, POC    Collection Time: 12/13/21  8:40 PM   Result Value Ref Range    Glucose (POC) 190 (H) 65 - 117 mg/dL    Performed by [employer] PCT    URINALYSIS W/ REFLEX CULTURE    Collection Time: 12/13/21 11:31 PM    Specimen: Urine   Result Value Ref Range    Color YELLOW/STRAW      Appearance CLEAR CLEAR      Specific gravity 1.025 1.003 - 1.030      pH (UA) 6.0 5.0 - 8.0      Protein Negative NEG mg/dL    Glucose 500 (A) NEG mg/dL    Ketone Negative NEG mg/dL    Bilirubin Negative NEG      Blood Negative NEG      Urobilinogen 0.2 0.2 - 1.0 EU/dL    Nitrites Negative NEG      Leukocyte Esterase TRACE (A) NEG      WBC 5-10 0 - 4 /hpf    RBC 0-5 0 - 5 /hpf    Epithelial cells FEW FEW /lpf    Bacteria Negative NEG /hpf    UA:UC IF INDICATED CULTURE NOT INDICATED BY UA RESULT CNI     CBC W/O DIFF    Collection Time: 12/14/21  2:18 AM   Result Value Ref Range    WBC 6.0 3.6 - 11.0 K/uL    RBC 4.28 3.80 - 5.20 M/uL    HGB 14.2 11.5 - 16.0 g/dL    HCT 41.7 35.0 - 47.0 %    MCV 97.4 80.0 - 99.0 FL    MCH 33.2 26.0 - 34.0 PG    MCHC 34.1 30.0 - 36.5 g/dL    RDW 12.8 11.5 - 14.5 %    PLATELET 578 195 - 478 K/uL    MPV 10.2 8.9 - 12.9 FL    NRBC 0.0 0  WBC    ABSOLUTE NRBC 0.00 0.00 - 6.10 K/uL   METABOLIC PANEL, BASIC    Collection Time: 12/14/21  2:18 AM   Result Value Ref Range    Sodium 135 (L) 136 - 145 mmol/L    Potassium 3.6 3.5 - 5.1 mmol/L    Chloride 101 97 - 108 mmol/L    CO2 28 21 - 32 mmol/L    Anion gap 6 5 - 15 mmol/L    Glucose 294 (H) 65 - 100 mg/dL    BUN 30 (H) 6 - 20 MG/DL    Creatinine 0.92 0.55 - 1.02 MG/DL    BUN/Creatinine ratio 33 (H) 12 - 20      GFR est AA >60 >60 ml/min/1.73m2    GFR est non-AA 60 (L) >60 ml/min/1.73m2    Calcium 9.1 8.5 - 10.1 MG/DL         Assessment/Plan:     Principal Problem:    Acute on chronic respiratory failure (HCC) (12/11/2021)    Active Problems:    Controlled type 2 diabetes mellitus with stage 2 chronic kidney disease, with long-term current use of insulin ([facility] (4/20/2015)      Hypertension with renal disease (8/24/2017)      CKD (chronic kidney disease), stage II (8/24/2017)      Class 1 obesity due to excess calories without serious comorbidity with body mass index (BMI) of 33.0 to 33.9 in adult (4/20/2015)      ILD (interstitial lung disease) ([facility] (12/11/2021)      Hyperglycemia due to diabetes mellitus ([facility] (12/11/2021)       ___________________________________________________  PLAN:    1. Supplemental oxygen to keep Sat > 90%  2. Continue Solumedrol, but decreased to Q 12h  3. Continue Duoneb  4. Continue Zithromax to cover possible secondary infection  5. Increased Lantus for DM control and continue Glimperide, Will increase lantus further today  6. Follow BS and cover with SSI  7. Potassium supplementation with hypokalemia, now 3.6  8. Continue Protonix for PUD prevention  9. Continue home Singulair  10. Continue other home meds  11.  Plans for SNF Rehab    355  Minutes spent today in direct care of this high complexity patient with greater than 50% in counseling and coordination of care.     If need to contact me use hospital  [phone], DO NOT USE PERFECT SERVE    ___________________________________________________    Attending Physician: Windy Stein MD

## 2021-12-14 NOTE — PROGRESS NOTES
Problem: Mobility Impaired (Adult and Pediatric)  Goal: *Acute Goals and Plan of Care (Insert Text)  Description: FUNCTIONAL STATUS PRIOR TO ADMISSION: Patient was modified independent using a SB quad cane and single point cane for functional mobility. Also owns a RW but states her home is too small to use the RW inside. Reports she has been very sedentary at home and mainly stays in bed. Does not drive anymore and amb mainly short distances secondary to breathing issues. Orders groceries but even finds act of putting groceries away to be exhausting. HOME SUPPORT PRIOR TO ADMISSION: The patient lived alone with no local support. Daughter lives in [place] and only other assistance in neighbor if absolutely needed per patient. Physical Therapy Goals  Initiated 12/13/2021  1. Patient will move from supine to sit and sit to supine  in bed with modified independence within 7 day(s). 2.  Patient will transfer from bed to chair and chair to bed with modified independence using the least restrictive device within 7 day(s). 3.  Patient will perform sit to stand with modified independence within 7 day(s). 4.  Patient will ambulate with modified independence for 100 feet with the least restrictive device within 7 day(s). Outcome: Progressing Towards Goal     PHYSICAL THERAPY TREATMENT  Patient: Mynor Lester (21 y.o. female)  Date: 12/14/2021  Diagnosis: Acute on chronic respiratory failure (HCC) [J96.20]  ILD (interstitial lung disease) ([facility] [J84.9]  Hyperglycemia due to diabetes mellitus ([facility] [E11.65]   Acute on chronic respiratory failure (HCC)       Precautions: DNR, Fall  Chart, physical therapy assessment, plan of care and goals were reviewed. ASSESSMENT  Patient continues with skilled PT services and is progressing towards goals. Pt received supine in bed and willing to work with therapy.  Pt continues to be limited with bilateral HS strength, hip strengthening with L>R, and SOB with activity this session. Pt reports that she is willing to go to rehab to help improve before going home. Pt able to tolerate supine LE ex with VC for form, noted L hip weakness. Pt continued with bed mobility to L side EOB with SBA, followed by STS to RW with CGA. Pt continued with gait training in hallway up to 100' with RW, CGA, noted decreased LUE step clearance and decreased step length with R sided shift, VC needed for control of speed with SOB and slight impulsive movements towards end of gait training. Pt ed for RW placement with amb and safety awareness. Pt completed session supine in bed with call bell within reach and all needs met at the time. Rn notified of session. Current Level of Function Impacting Discharge (mobility/balance): SBA/CGA for mobility, amb up to 100' with RW    Other factors to consider for discharge: fall risk, SOB with activity, lives alone         PLAN :  Patient continues to benefit from skilled intervention to address the above impairments. Continue treatment per established plan of care. to address goals. Recommendation for discharge: (in order for the patient to meet his/her long term goals)  Therapy up to 5 days/week in SNF setting    This discharge recommendation:  Has not yet been discussed the attending provider and/or case management    IF patient discharges home will need the following DME: to be determined (TBD)       SUBJECTIVE:   Patient stated I know I need to go to therapy so they can help me with my meds and food choices.  Kiarra Sandisfield    OBJECTIVE DATA SUMMARY:   Critical Behavior:  Neurologic State: Alert, Appropriate for age  Orientation Level: Oriented X4, Appropriate for age  Cognition: Appropriate decision making, Appropriate for age attention/concentration, Appropriate safety awareness, Recognition of people/places  Safety/Judgement: Awareness of environment  Functional Mobility Training:  Bed Mobility:  Rolling: Stand-by assistance  Supine to Sit: Stand-by assistance  Sit to Supine: Stand-by assistance  Scooting: Stand-by assistance     Transfers:  Sit to Stand: Contact guard assistance  Stand to Sit: Contact guard assistance     Balance:  Sitting: Intact  Standing: Impaired  Standing - Static: Constant support; Good  Standing - Dynamic : Constant support; Fair    Ambulation/Gait Training:  Distance (ft): 100 Feet (ft)  Assistive Device: Gait belt; Walker, rolling  Ambulation - Level of Assistance: Contact guard assistance  Gait Abnormalities: Decreased step clearance; Trunk sway increased  Base of Support: Shift to right; Narrowed  Speed/Vivian: Pace decreased (<100 feet/min); Slow  Step Length: Right shortened; Left shortened    Therapeutic Exercises:   Supine LE ex- bridges, HS sets, SLR x10 ea  Pain Rating:  No pain reported    Activity Tolerance:   Good and Fair    After treatment patient left in no apparent distress:   Supine in bed and Call bell within reach    COMMUNICATION/COLLABORATION:   The patients plan of care was discussed with: Registered nurse.      Latonya Mares PTA   Time Calculation: 40 mins

## 2021-12-14 NOTE — PROGRESS NOTES
Pulmonary, Critical Care, and Sleep Medicine     Pulmonary Progress Note     Name: Twyla Oliver MRN: [id number]   : 1945 Hospital: [facility]   Date: 2021          IMPRESSION:   · ILD, total lung capacity was 2.63 L which is 57% predicted on 2021. Appears to be more fibrotic disease on most recent CT scan of the chest on patient also is noted to have what appears to be worsening inflammation versus an atypical infection or inflammatory process. · Moderate to severe coughing paroxsyms. Added scheduled tessalon, some improvement. · Acute Hypoxic respiratory Failure. Now feeling some better. Less dyspnea and less cough. · 6  Minute walk test on 21: Walked 147 meter. No desaturation. Pox was 94% after walking. · Scoliosis  · S/p Covid vaccine back in March. · T2DM with hyperglycemia, will have to monitor with use of steroids. · RLS  · Moderately Severe ANGELINE-she has sleep follow up appt this Wednesday (telemed)      RECOMMENDATIONS:   · Transition steroids to PO, equivalent dose - and slow taper  · PT/OT, continue to mobilize as able and would get out of bed for meals  · O2 PRN goal sat above 89% (may need more with exertion)  · Airway clearance therapies  · Monitor her glucose very closely on the steroids  · Continue the current Abx therapy, last dose azithro in the AM  · DVT prophylaxis  · Discharge planning - sounds like the goal is to get her to SNF rehab tomorrow morning, no objection  · Will sign off and plan to follow up with her through the pulmonary and sleep clinics. Thanks for the consult     Subjective:       Up in chair. States that she is feeling a bit better. Still with paroxysms of cough but this may be a bit better as well. Reviewed incentive spirometer with her.      Pt has not been able to sleep. Had increased coughing and congestion. Her cough is moderate to severe, frequent. Nonproductive. May be a little worse with eating. Seems worse when she is sitting up. No better with cough drop. Denies any sinus drainage. NO overt aspiration with eating. NO chest pain, no back pain, no leg pain. 12-12:   Her cough has been better. No headaches. Has mild sinus congestion. No chest pain or back pain. NO leg pain. She was able to get some sleep last pm. Has pretty good appetite. No issues with dysphagia at present. This patient has been seen and evaluated at the request of Dr. Gabrielle Henson for above. Patient is a 78 y.o. female Who is well known to me. She reports that she has had worsening cough for the last 1 week. She has tried increased dose of prednisone as an outpatient  (30mg) which did not improve her symptoms. Due to her severe fatigue and dyspnea she's had a decreased appetite for last 3 days. She's had ongoing issues with dysphaaia she felt like this may be due to her weakness, has been avoiding foods. Lives by her self no sick contacts. She felt like she had oral herpes of her lips, mouth earlier this week, Seems improved with acyclovir.        Past Medical History:   Diagnosis Date    Abnormal LFTs 08/24/2017    FATTY LIVER    Arthritis     OSTEO    Avascular necrosis of hip ([address] 08/24/2017    BILAT HIPS    Breast CA ([address] 1989, 2001    BILATERAL; SURGERY, CHEMO    Chronic pain     CKD (chronic kidney disease), stage II 8/24/2017    Coagulation disorder ([address] 1984    ITP  (DR CHU BRADSHAW) - PLATELETS DROPPED TO 5K    Depression     Diabetes ([address]     TYPE 2; NIDDM    DJD (degenerative joint disease), multiple sites 8/24/2017    GI bleed 2008    HEMORRHOIDS    Hyperlipemia     Hypertension with renal disease 8/24/2017    IBS (irritable bowel syndrome) 8/24/2017    Ill-defined condition 2015    PNEUMONIA X2 - HOSPITALIZED IN 2015    Interstitial lung disease ([address] 04/01/2019    Liver disease     FATTY LIVER    Myalgia 8/24/2017    On statin therapy 8/24/2017    Overactive bladder 8/24/2017    Pneumonia 04/2015 HOSPITALIZED 3 WEEKS.  Polymyalgia rheumatica ([facility] [address] 8/24/2017    Prophylactic antibiotic 8/24/2017    Reflex abnormality     acid reflex    Rosacea       Past Surgical History:   Procedure Laterality Date    HX APPENDECTOMY  1950    HX BREAST BIOPSY Bilateral     HX CATARACT REMOVAL Bilateral     HX COLONOSCOPY      HX ENDOSCOPY      HX GI      COLONOSCOPY    HX HEENT      WISDOM TEETH    HX HYSTERECTOMY  2000S    HX MASTECTOMY Right 1989    HX MASTECTOMY Left 2001    HX ORTHOPAEDIC  2008    LUMBAR FUSION    HX ORTHOPAEDIC  2018    RE-DO LUMBAR FUSION, AND ADDED THORACIC FUSION    HX ORTHOPAEDIC  03/26/2019    neckectomy    HX TUBAL LIGATION  1981    HX UROLOGICAL  2000s    BLADDER SLING    LA BREAST SURGERY PROCEDURE UNLISTED  1993, 2002    RECONSTRUCTION X2    LA TOTAL HIP ARTHROPLASTY Left 11/16/2016    ANTERIOR APPROACH, DR Gwendolyn De La Torre; (POSTOP: STANDS ONE INCH TALLER ON LEFT FOOT)    LA TOTAL HIP ARTHROPLASTY Right 12/2016    ANTERIOR APPROACH (DR Gwendolyn De La Torre)      Prior to Admission medications    Medication Sig Start Date End Date Taking? Authorizing Provider   glimepiride (AMARYL) 4 mg tablet TAKE 1 TABLET TWICE A DAY (DOSE/DIRECTIONS CHANGED) 11/9/21  Yes José Vasquez MD   insulin glargine (Lantus Solostar U-100 Insulin) 100 unit/mL (3 mL) inpn INJECT 30 UNITS DAILY 10/26/21  Yes José Vasquez MD   rosuvastatin (CRESTOR) 20 mg tablet Take 1 Tablet by mouth nightly.  10/21/21  Yes José Vasquez MD   buPROPion XL (WELLBUTRIN XL) 150 mg tablet TAKE 1 TABLET DAILY 9/14/21  Yes José Vasquez MD   benzonatate (TESSALON) 200 mg capsule TAKE 1 CAPSULE BY MOUTH THREE TIMES DAILY AS NEEDED FOR COUGH - SWALLOW CAPSULE WHOLE (DO NOT CRUSH) 8/30/21  Yes José Vasquez MD   predniSONE (DELTASONE) 20 mg tablet Take one tablet daily 8/6/21  Yes José Vasquez MD   Januvia 100 mg tablet TAKE 1 TABLET DAILY 8/2/21  Yes José Vasquez MD   sertraline (ZOLOFT) 100 mg tablet TAKE 1 TABLET DAILY 8/2/21  Yes Kimo Almendarez MD   carvediloL (COREG) 6.25 mg tablet TAKE 1 TABLET TWICE A DAY 7/19/21  Yes Kimo Almendarez MD   BD Ultra-Fine Short Pen Needle 31 gauge x 5/16\" ndle USE UNDER THE SKIN DAILY. USE WITH LANTUS FLEX PEN DAILY 7/13/21  Yes Kimo Almendarez MD   fenofibrate nanocrystallized (TRICOR) 145 mg tablet Take 1 Tablet by mouth daily. 6/14/21  Yes Kimo Almendarez MD   insulin glargine (LANTUS,BASAGLAR) 100 unit/mL (3 mL) inpn Take 30 units daily 4/13/21  Yes Kimo Almendarez MD   metFORMIN ER (GLUCOPHAGE XR) 500 mg tablet Take two tablet daily with dinner. 4/13/21  Yes Kimo Almendarez MD   hydroCHLOROthiazide (HYDRODIURIL) 25 mg tablet TAKE 1 TABLET DAILY FOR FLUID AND BLOOD PRESSURE 4/5/21  Yes Kimo Almendarez MD   glucose blood VI test strips (True Metrix Glucose Test Strip) strip USE ONCE DAILY AND RECORD RESULTS IN A NOTEBOOK ALSO RECORD LAST MEALTIME IN NOTEBOOK 3/23/21  Yes Kimo Almendarez MD   clemastine 2.68 mg tab tablet Take 2.68 mg by mouth two (2) times a day. Yes Provider, Historical   oxazepam (SERAX) 15 mg capsule TAKE 2 CAPSULES BY MOUTH NIGHTLY AT BEDTIME 8/13/20  Yes Kimo Almendarez MD   mupirocin [facility]) 2 % ointment Apply  to affected area three (3) times daily. 8/13/20  Yes Kimo Almendarez MD   clindamycin (CLEOCIN) 300 mg capsule Take 2 capsules 1 hour before dental procedure 8/13/20  Yes Kimo Almendarez MD   pantoprazole (PROTONIX) 40 mg tablet Take 40 mg by mouth daily. 11/21/19  Yes Provider, Historical   montelukast sodium (SINGULAIR PO) Take 10 mg by mouth.  10 mg tab   Yes Provider, Historical   doxycycline (VIBRAMYCIN) 100 mg capsule TAKE 1 (ONE) CAPSULE BY MOUTH TWICE DAILY WITH FOOD  Patient not taking: Reported on 12/12/2021 8/27/20   Provider, Historical     Allergies   Allergen Reactions    Metformin Diarrhea    Statins-Hmg-Coa Reductase Inhibitors Myalgia     Myalgia with  multiple statins  Takes pravachol     Codeine Rash     Rash on thighs    Darvon [Propoxyphene] Other (comments)     \"I see worms\"    Pcn [Penicillins] Rash    Sulfa (Sulfonamide Antibiotics) Rash      Social History     Tobacco Use    Smoking status: Never Smoker    Smokeless tobacco: Never Used   Substance Use Topics    Alcohol use: No      Family History   Problem Relation Age of Onset    Heart Disease Mother     Kidney Disease Mother     Lung Disease Mother         COPD    Diabetes Brother     Pacemaker Brother     Kidney Disease Brother     Hypertension Brother     Anesth Problems Neg Hx         Current Facility-Administered Medications   Medication Dose Route Frequency    insulin glargine (LANTUS) injection 80 Units  80 Units SubCUTAneous DAILY    methylPREDNISolone (PF) (SOLU-MEDROL) injection 40 mg  40 mg IntraVENous Q12H    benzonatate (TESSALON) capsule 200 mg  200 mg Oral TID    potassium chloride SR (KLOR-CON 10) tablet 10 mEq  10 mEq Oral BID    albuterol-ipratropium (DUO-NEB) 2.5 MG-0.5 MG/3 ML  3 mL Nebulization BID RT    buPROPion XL (WELLBUTRIN XL) tablet 150 mg  150 mg Oral DAILY    carvediloL (COREG) tablet 6.25 mg  6.25 mg Oral BID WITH MEALS    fenofibrate nanocrystallized (TRICOR) tablet 145 mg  145 mg Oral DAILY    glimepiride (AMARYL) tablet 4 mg  4 mg Oral ACB&D    hydroCHLOROthiazide (HYDRODIURIL) tablet 25 mg  25 mg Oral DAILY    pantoprazole (PROTONIX) tablet 40 mg  40 mg Oral DAILY    rosuvastatin (CRESTOR) tablet 20 mg  20 mg Oral QHS    sertraline (ZOLOFT) tablet 100 mg  100 mg Oral DAILY    sodium chloride (NS) flush 5-40 mL  5-40 mL IntraVENous Q8H    enoxaparin (LOVENOX) injection 40 mg  40 mg SubCUTAneous DAILY    insulin lispro (HUMALOG) injection   SubCUTAneous AC&HS    azithromycin (ZITHROMAX) tablet 500 mg  500 mg Oral DAILY    montelukast (SINGULAIR) tablet 10 mg  10 mg Oral QHS       Review of Systems:  Constitutional: positive for fevers, chills and fatigue  Eyes: negative  Ears, nose, mouth, throat, and face: positive for nasal congestion  Respiratory: positive for cough, sputum, hemoptysis, dyspnea on exertion or chronic bronchitis  Cardiovascular: negative  Gastrointestinal: positive for dysphagia  Genitourinary:negative  Integument/breast: negative  Hematologic/lymphatic: negative  Musculoskeletal:positive for myalgias  Neurological: negative  Behavioral/Psych: negative  Endocrine: negative  Allergic/Immunologic: positive for hay fever    Objective:   Vital Signs:    Visit Vitals  BP (!) 140/76 (BP 1 Location: Left upper arm, BP Patient Position: At rest)   Pulse 67   Temp 98.9 °F (37.2 °C)   Resp 20   Ht 5' 3\" (1.6 m)   Wt 90.4 kg (199 lb 4.7 oz)   SpO2 97%   BMI 35.30 kg/m²       O2 Device: None (Room air)   O2 Flow Rate (L/min): 1.5 l/min   Temp (24hrs), Av.4 °F (36.9 °C), Min:97.4 °F (36.3 °C), Max:98.9 °F (37.2 °C)       Intake/Output:   Last shift:      No intake/output data recorded. Last 3 shifts:  1901 -  0700  In: 880 [P.O.:880]  Out: 200 [Urine:200]    Intake/Output Summary (Last 24 hours) at 2021 0902  Last data filed at 2021 0248  Gross per 24 hour   Intake 460 ml   Output 200 ml   Net 260 ml      Physical Exam:   General:  Alert, cooperative, no distress, appears stated age. Lying in bed, seems to have better energy today. On 2L NC with pox of 95%. Conversant, Had moderate coughing when seen this am.    Head:  Normocephalic, without obvious abnormality, atraumatic. Eyes:  Conjunctivae/corneas clear. PERRL, EOMs intact. Nose: Nares normal. Septum midline. Mucosa normal. No drainage or sinus tenderness. Throat: Lips, mucosa, and tongue normal. Teeth and gums normal.   Neck: Supple, symmetrical, trachea midline, no adenopathy, thyroid: no enlargment/tenderness/nodules, no carotid bruit and no JVD. Back:   Symmetric, no curvature. ROM normal.   Lungs:   Bilateral basilar rales. Comfortable.  Pretty clear anteriorly. Has frequent coughing when seen this am.    Chest wall:  No tenderness or deformity. Heart:  Regular rate and rhythm, S1, S2 normal, no murmur, click, rub or gallop. Abdomen:   Soft, non-tender. Bowel sounds normal. No masses,  No organomegaly. Extremities: Extremities normal, atraumatic, no cyanosis or edema. Pulses: 2+ and symmetric all extremities. Skin: Skin color, texture, turgor normal. No rashes or lesions   Lymph nodes: Cervical, supraclavicular, and axillary nodes normal.   Neurologic: Grossly nonfocal, motor seems to be intact.       Data review:     Recent Results (from the past 24 hour(s))   GLUCOSE, POC    Collection Time: 12/13/21 11:14 AM   Result Value Ref Range    Glucose (POC) 375 (H) 65 - 117 mg/dL    Performed by Savanna White PCT    GLUCOSE, POC    Collection Time: 12/13/21  4:50 PM   Result Value Ref Range    Glucose (POC) 289 (H) 65 - 117 mg/dL    Performed by Savanna White PCT    GLUCOSE, POC    Collection Time: 12/13/21  8:40 PM   Result Value Ref Range    Glucose (POC) 190 (H) 65 - 117 mg/dL    Performed by Radha Lorenzo PCT    URINALYSIS W/ REFLEX CULTURE    Collection Time: 12/13/21 11:31 PM    Specimen: Urine   Result Value Ref Range    Color YELLOW/STRAW      Appearance CLEAR CLEAR      Specific gravity 1.025 1.003 - 1.030      pH (UA) 6.0 5.0 - 8.0      Protein Negative NEG mg/dL    Glucose 500 (A) NEG mg/dL    Ketone Negative NEG mg/dL    Bilirubin Negative NEG      Blood Negative NEG      Urobilinogen 0.2 0.2 - 1.0 EU/dL    Nitrites Negative NEG      Leukocyte Esterase TRACE (A) NEG      WBC 5-10 0 - 4 /hpf    RBC 0-5 0 - 5 /hpf    Epithelial cells FEW FEW /lpf    Bacteria Negative NEG /hpf    UA:UC IF INDICATED CULTURE NOT INDICATED BY UA RESULT CNI     CBC W/O DIFF    Collection Time: 12/14/21  2:18 AM   Result Value Ref Range    WBC 6.0 3.6 - 11.0 K/uL    RBC 4.28 3.80 - 5.20 M/uL    HGB 14.2 11.5 - 16.0 g/dL    HCT 41.7 35.0 - 47.0 %    MCV 97.4 80.0 - 99.0 FL    MCH 33.2 26.0 - 34.0 PG    MCHC 34.1 30.0 - 36.5 g/dL    RDW 12.8 11.5 - 14.5 %    PLATELET 298 762 - 116 K/uL    MPV 10.2 8.9 - 12.9 FL    NRBC 0.0 0  WBC    ABSOLUTE NRBC 0.00 0.00 - 5.35 K/uL   METABOLIC PANEL, BASIC    Collection Time: 12/14/21  2:18 AM   Result Value Ref Range    Sodium 135 (L) 136 - 145 mmol/L    Potassium 3.6 3.5 - 5.1 mmol/L    Chloride 101 97 - 108 mmol/L    CO2 28 21 - 32 mmol/L    Anion gap 6 5 - 15 mmol/L    Glucose 294 (H) 65 - 100 mg/dL    BUN 30 (H) 6 - 20 MG/DL    Creatinine 0.92 0.55 - 1.02 MG/DL    BUN/Creatinine ratio 33 (H) 12 - 20      GFR est AA >60 >60 ml/min/1.73m2    GFR est non-AA 60 (L) >60 ml/min/1.73m2    Calcium 9.1 8.5 - 10.1 MG/DL   GLUCOSE, POC    Collection Time: 12/14/21  6:45 AM   Result Value Ref Range    Glucose (POC) 246 (H) 65 - 117 mg/dL    Performed by Lyle Lopez PCT        Imaging:  I have personally reviewed the patients radiographs and have reviewed the reports:  12/11/2021: FINDINGS: This is a good quality study for the evaluation of pulmonary embolism  to the first subsegmental arterial level. There is no pulmonary embolism to this  level.      THYROID: Left hemithyroidectomy. Stable 2.0 cm and 0.6 cm hypodense right  thyroid lobe nodules  CHEST WALL: Bilateral mastectomies. Chronically ruptured left breast implant. MEDIASTINUM: No mass or lymphadenopathy. MARIAN: No mass or lymphadenopathy. Several calcified right hilar lymph nodes. THORACIC AORTA: No aneurysm. Atherosclerosis. HEART: Normal in size. Scattered coronary artery calcifications. ESOPHAGUS: No wall thickening or dilatation. TRACHEA/BRONCHI: Patent. PLEURA: No effusion or pneumothorax. LUNGS: Widespread subtle subpleural fibrotic changes. Widespread scattered  patchy groundglass opacities and interlobular septal thickening. UPPER ABDOMEN: Partially imaged. No acute pathology. BONES: No aggressive bone lesion or fracture.  Partially visualized cervical and  thoracolumbar spine hardware. ? Renal osteodystrophy.     IMPRESSION  1. No pulmonary embolism. 2.  Widespread patchy groundglass opacities and interlobular septal thickening  may reflect multifocal atypical infection, inflammatory process, and/or edema.         Jeni Reyna, PA

## 2021-12-14 NOTE — PROGRESS NOTES
Bedside(SBAR) Shift report given to [facility], [place] ,[address] [facility]. Pt ambulated in the hallway, 02 stayed above 90% on RA. Pt provided incentive spirometer and encouraged to use during shift. Otherwise uneventful shift.

## 2021-12-14 NOTE — PROGRESS NOTES
Problem: Falls - Risk of  Goal: *Absence of Falls  Description: Document Kavin Jauregui Fall Risk and appropriate interventions in the flowsheet. Outcome: Progressing Towards Goal  Note: Fall Risk Interventions:  Mobility Interventions: Bed/chair exit alarm, Communicate number of staff needed for ambulation/transfer         Medication Interventions: Bed/chair exit alarm, Evaluate medications/consider consulting pharmacy, Patient to call before getting OOB, Teach patient to arise slowly    Elimination Interventions: Bed/chair exit alarm, Call light in reach, Elevated toilet seat, Patient to call for help with toileting needs, Stay With Me (per policy), Toilet paper/wipes in reach, Toileting schedule/hourly rounds    History of Falls Interventions: Bed/chair exit alarm, Consult care management for discharge planning, Utilize gait belt for transfer/ambulation, Assess for delayed presentation/identification of injury for 48 hrs (comment for end date)         Problem: Patient Education: Go to Patient Education Activity  Goal: Patient/Family Education  Outcome: Progressing Towards Goal     Problem: Pressure Injury - Risk of  Goal: *Prevention of pressure injury  Description: Document Hari Scale and appropriate interventions in the flowsheet.   Outcome: Progressing Towards Goal  Note: Pressure Injury Interventions:  Sensory Interventions: Assess changes in LOC, Assess need for specialty bed, Avoid rigorous massage over bony prominences, Chair cushion, Check visual cues for pain, Discuss PT/OT consult with provider, Float heels, Keep linens dry and wrinkle-free    Moisture Interventions: Absorbent underpads, Apply protective barrier, creams and emollients, Assess need for specialty bed, Check for incontinence Q2 hours and as needed, Limit adult briefs    Activity Interventions: Chair cushion, Increase time out of bed, Pressure redistribution bed/mattress(bed type), PT/OT evaluation    Mobility Interventions: Assess need for specialty bed, Chair cushion, Float heels, HOB 30 degrees or less, Pressure redistribution bed/mattress (bed type), PT/OT evaluation    Nutrition Interventions: Document food/fluid/supplement intake, Offer support with meals,snacks and hydration    Friction and Shear Interventions: Apply protective barrier, creams and emollients, Feet elevated on foot rest, Foam dressings/transparent film/skin sealants, HOB 30 degrees or less, Lift sheet, Lift team/patient mobility team, Minimize layers, Sit at 90-degree angle                Problem: Patient Education: Go to Patient Education Activity  Goal: Patient/Family Education  Outcome: Progressing Towards Goal     Problem: Breathing Pattern - Ineffective  Goal: *Absence of hypoxia  Outcome: Progressing Towards Goal  Goal: *Use of effective breathing techniques  Outcome: Progressing Towards Goal  Goal: *PALLIATIVE CARE:  Alleviation of Dyspnea  Outcome: Progressing Towards Goal     Problem: Patient Education: Go to Patient Education Activity  Goal: Patient/Family Education  Outcome: Progressing Towards Goal     Problem: Patient Education: Go to Patient Education Activity  Goal: Patient/Family Education  Outcome: Progressing Towards Goal

## 2021-12-14 NOTE — PROGRESS NOTES
ADULT PROTOCOL: JET AEROSOL ASSESSMENT    Patient  Rafael Dunbar     78 y.o.   female     12/14/2021  9:59 AM    Breath Sounds Pre Procedure: Right Breath Sounds: Clear                               Left Breath Sounds: Clear    Breath Sounds Post Procedure: Right Breath Sounds: Clear                                 Left Breath Sounds: Clear    Breathing pattern: Pre procedure Breathing Pattern: Regular          Post procedure Breathing Pattern: Regular    Heart Rate: Pre procedure Pulse: 70           Post procedure Pulse: 68    Resp Rate: Pre procedure Respirations: 16           Post procedure Respirations: 16    Oxygen: O2 Device: None (Room air)        SpO2: Pre procedure SpO2: 97 %                Post procedure SpO2: 96 %      Nebulizer Therapy: Current medications Aerosolized Medications: DuoNeb      Problem List:   Patient Active Problem List   Diagnosis Code    Controlled type 2 diabetes mellitus with stage 2 chronic kidney disease, with long-term current use of insulin ([facility]) E11.22, N18.2, Z79.4    Non-seasonal allergic rhinitis J30.89    Class 1 obesity due to excess calories without serious comorbidity with body mass index (BMI) of 33.0 to 33.9 in adult E66.09, Z68.33    Rosacea L71.9    Mixed hyperlipidemia E78.2    Anxiety F41.9    GI bleed K92.2    Overactive bladder N32.81    Polymyalgia rheumatica ([facility]) M35.3    IBS (irritable bowel syndrome) K58.9    Hypertension with renal disease I12.9    CKD (chronic kidney disease), stage II N18.2    Avascular necrosis of hip ([facility]) M87.059    Abnormal LFTs R94.5    Vitamin D deficiency E55.9    Recurrent depression ([facility]) F33.9    Primary osteoarthritis involving multiple joints M89.49    Sleep disturbance G47.9    Lumbar disc disease M51.9    Spinal stenosis, lumbar M48.061    Dyspnea on exertion R06.00    Spinal stenosis, cervical region M48.02    Cervical stenosis of spine M48.02    Interstitial lung disease ([facility]) J84.9    Acute bronchitis J20.9    Non-recurrent acute suppurative otitis media of left ear without spontaneous rupture of tympanic membrane H66.002    Impacted cerumen of right ear H61.21    Other fatigue R53.83    Gait instability R26.81    Glossitis K14.0    Cough R05.9    Alcohol screening Z13.39    Primary osteoarthritis of right shoulder M19.011    Acute non-recurrent maxillary sinusitis J01.00    Tremor R25.1    Severe obesity ([facility]) E66.01    Chronic midline low back pain without sciatica M54.50, G89.29    ILD (interstitial lung disease) ([facility]) J84.9    Acute on chronic respiratory failure ([facility]) J96.20    Hyperglycemia due to diabetes mellitus ([facility] E11.65       Respiratory Therapist: Rosa Manzano RT

## 2021-12-14 NOTE — PROGRESS NOTES
Problem: Falls - Risk of  Goal: *Absence of Falls  Description: Document Lorne Herring Fall Risk and appropriate interventions in the flowsheet.   Outcome: Progressing Towards Goal  Note: Fall Risk Interventions:  Mobility Interventions: Assess mobility with egress test, Communicate number of staff needed for ambulation/transfer, Bed/chair exit alarm, OT consult for ADLs, Patient to call before getting OOB, PT Consult for mobility concerns, PT Consult for assist device competence, Strengthening exercises (ROM-active/passive), Utilize walker, cane, or other assistive device, Utilize gait belt for transfers/ambulation         Medication Interventions: Assess postural VS orthostatic hypotension, Bed/chair exit alarm, Evaluate medications/consider consulting pharmacy, Patient to call before getting OOB, Teach patient to arise slowly    Elimination Interventions: Bed/chair exit alarm, Call light in reach, Elevated toilet seat, Patient to call for help with toileting needs, Stay With Me (per policy), Toilet paper/wipes in reach, Toileting schedule/hourly rounds    History of Falls Interventions: Bed/chair exit alarm, Consult care management for discharge planning, Door open when patient unattended, Evaluate medications/consider consulting pharmacy, Investigate reason for fall, Room close to nurse's station, Utilize gait belt for transfer/ambulation, Assess for delayed presentation/identification of injury for 48 hrs (comment for end date)

## 2021-12-15 LAB
ANION GAP SERPL CALC-SCNC: 6 MMOL/L (ref 5–15)
BUN SERPL-MCNC: 28 MG/DL (ref 6–20)
BUN/CREAT SERPL: 33 (ref 12–20)
CALCIUM SERPL-MCNC: 9.1 MG/DL (ref 8.5–10.1)
CHLORIDE SERPL-SCNC: 103 MMOL/L (ref 97–108)
CO2 SERPL-SCNC: 28 MMOL/L (ref 21–32)
CREAT SERPL-MCNC: 0.84 MG/DL (ref 0.55–1.02)
GLUCOSE BLD STRIP.AUTO-MCNC: 207 MG/DL (ref 65–117)
GLUCOSE BLD STRIP.AUTO-MCNC: 231 MG/DL (ref 65–117)
GLUCOSE BLD STRIP.AUTO-MCNC: 248 MG/DL (ref 65–117)
GLUCOSE BLD STRIP.AUTO-MCNC: 317 MG/DL (ref 65–117)
GLUCOSE SERPL-MCNC: 338 MG/DL (ref 65–100)
POTASSIUM SERPL-SCNC: 3.8 MMOL/L (ref 3.5–5.1)
SERVICE CMNT-IMP: ABNORMAL
SODIUM SERPL-SCNC: 137 MMOL/L (ref 136–145)

## 2021-12-15 PROCEDURE — 97530 THERAPEUTIC ACTIVITIES: CPT

## 2021-12-15 PROCEDURE — 36415 COLL VENOUS BLD VENIPUNCTURE: CPT

## 2021-12-15 PROCEDURE — 97535 SELF CARE MNGMENT TRAINING: CPT

## 2021-12-15 PROCEDURE — 74011250637 HC RX REV CODE- 250/637: Performed by: FAMILY MEDICINE

## 2021-12-15 PROCEDURE — 74011250637 HC RX REV CODE- 250/637: Performed by: INTERNAL MEDICINE

## 2021-12-15 PROCEDURE — 80048 BASIC METABOLIC PNL TOTAL CA: CPT

## 2021-12-15 PROCEDURE — 97116 GAIT TRAINING THERAPY: CPT

## 2021-12-15 PROCEDURE — 74011636637 HC RX REV CODE- 636/637: Performed by: PHYSICIAN ASSISTANT

## 2021-12-15 PROCEDURE — 74011636637 HC RX REV CODE- 636/637: Performed by: INTERNAL MEDICINE

## 2021-12-15 PROCEDURE — 82962 GLUCOSE BLOOD TEST: CPT

## 2021-12-15 PROCEDURE — 74011250636 HC RX REV CODE- 250/636: Performed by: INTERNAL MEDICINE

## 2021-12-15 PROCEDURE — 74011000250 HC RX REV CODE- 250: Performed by: INTERNAL MEDICINE

## 2021-12-15 PROCEDURE — 99232 SBSQ HOSP IP/OBS MODERATE 35: CPT | Performed by: INTERNAL MEDICINE

## 2021-12-15 PROCEDURE — 94640 AIRWAY INHALATION TREATMENT: CPT

## 2021-12-15 PROCEDURE — 65660000000 HC RM CCU STEPDOWN

## 2021-12-15 RX ORDER — INSULIN GLARGINE 100 [IU]/ML
80 INJECTION, SOLUTION SUBCUTANEOUS 2 TIMES DAILY
Status: DISCONTINUED | OUTPATIENT
Start: 2021-12-15 | End: 2021-12-16 | Stop reason: HOSPADM

## 2021-12-15 RX ADMIN — INSULIN GLARGINE 80 UNITS: 100 INJECTION, SOLUTION SUBCUTANEOUS at 17:37

## 2021-12-15 RX ADMIN — CARVEDILOL 6.25 MG: 6.25 TABLET, FILM COATED ORAL at 17:37

## 2021-12-15 RX ADMIN — BUPROPION HYDROCHLORIDE 150 MG: 150 TABLET, EXTENDED RELEASE ORAL at 09:18

## 2021-12-15 RX ADMIN — INSULIN LISPRO 6 UNITS: 100 INJECTION, SOLUTION INTRAVENOUS; SUBCUTANEOUS at 09:15

## 2021-12-15 RX ADMIN — CARVEDILOL 6.25 MG: 6.25 TABLET, FILM COATED ORAL at 09:18

## 2021-12-15 RX ADMIN — Medication 10 ML: at 06:05

## 2021-12-15 RX ADMIN — Medication 10 ML: at 17:47

## 2021-12-15 RX ADMIN — ENOXAPARIN SODIUM 40 MG: 100 INJECTION SUBCUTANEOUS at 09:16

## 2021-12-15 RX ADMIN — BENZONATATE 200 MG: 100 CAPSULE ORAL at 09:18

## 2021-12-15 RX ADMIN — FENOFIBRATE 145 MG: 145 TABLET ORAL at 09:17

## 2021-12-15 RX ADMIN — INSULIN LISPRO 15 UNITS: 100 INJECTION, SOLUTION INTRAVENOUS; SUBCUTANEOUS at 17:37

## 2021-12-15 RX ADMIN — INSULIN GLARGINE 80 UNITS: 100 INJECTION, SOLUTION SUBCUTANEOUS at 09:15

## 2021-12-15 RX ADMIN — BENZONATATE 200 MG: 100 CAPSULE ORAL at 17:36

## 2021-12-15 RX ADMIN — ROSUVASTATIN 20 MG: 20 TABLET, FILM COATED ORAL at 21:37

## 2021-12-15 RX ADMIN — INSULIN LISPRO 2 UNITS: 100 INJECTION, SOLUTION INTRAVENOUS; SUBCUTANEOUS at 21:37

## 2021-12-15 RX ADMIN — GLIMEPIRIDE 4 MG: 4 TABLET ORAL at 09:18

## 2021-12-15 RX ADMIN — BENZONATATE 200 MG: 100 CAPSULE ORAL at 21:37

## 2021-12-15 RX ADMIN — POTASSIUM CHLORIDE 10 MEQ: 750 TABLET, FILM COATED, EXTENDED RELEASE ORAL at 09:18

## 2021-12-15 RX ADMIN — SERTRALINE 100 MG: 50 TABLET, FILM COATED ORAL at 09:18

## 2021-12-15 RX ADMIN — HYDROCHLOROTHIAZIDE 25 MG: 25 TABLET ORAL at 09:18

## 2021-12-15 RX ADMIN — IPRATROPIUM BROMIDE AND ALBUTEROL SULFATE 3 ML: .5; 3 SOLUTION RESPIRATORY (INHALATION) at 20:35

## 2021-12-15 RX ADMIN — MONTELUKAST 10 MG: 10 TABLET, FILM COATED ORAL at 21:37

## 2021-12-15 RX ADMIN — INSULIN LISPRO 6 UNITS: 100 INJECTION, SOLUTION INTRAVENOUS; SUBCUTANEOUS at 11:47

## 2021-12-15 RX ADMIN — GLIMEPIRIDE 4 MG: 4 TABLET ORAL at 17:36

## 2021-12-15 RX ADMIN — Medication 10 ML: at 21:38

## 2021-12-15 RX ADMIN — IPRATROPIUM BROMIDE AND ALBUTEROL SULFATE 3 ML: .5; 3 SOLUTION RESPIRATORY (INHALATION) at 07:26

## 2021-12-15 RX ADMIN — PANTOPRAZOLE SODIUM 40 MG: 40 TABLET, DELAYED RELEASE ORAL at 09:18

## 2021-12-15 RX ADMIN — AZITHROMYCIN 500 MG: 500 TABLET, FILM COATED ORAL at 09:17

## 2021-12-15 RX ADMIN — PREDNISONE 40 MG: 20 TABLET ORAL at 09:18

## 2021-12-15 RX ADMIN — PREDNISONE 40 MG: 20 TABLET ORAL at 17:36

## 2021-12-15 RX ADMIN — POTASSIUM CHLORIDE 10 MEQ: 750 TABLET, FILM COATED, EXTENDED RELEASE ORAL at 17:36

## 2021-12-15 NOTE — PROGRESS NOTES
INTERNAL MEDICINE PROGRESS NOTE    NAME:  Giles Hughes   :   1945   MRN:   [id number]     Date/Time:  12/15/2021 6:05 AM  Subjective:   History:  Chart reviewed and patient seen and examined and D/W her nurse this AM and all events noted. She has a history of severe interstitial lung disease, DM, HTN, Obesity, DJD and other chronic medical problems. She was admitted on  with acute on chronic respiratory failure due to acute bronchitis and DM out of control due to steroids which she was on PTA. This AM she continues to feel better than on admission with less SOB although currently at bedrest. She walked some yesterday but very weak and needs SNF Rehab. She has a little cough with only clear sputum. She has no other cardiac or respiratory c/o. She has no GI/ c/o. She has no other c/o on complete ROS.       Medications reviewed:  Current Facility-Administered Medications   Medication Dose Route Frequency    insulin glargine (LANTUS) injection 80 Units  80 Units SubCUTAneous DAILY    predniSONE (DELTASONE) tablet 40 mg  40 mg Oral BID WITH MEALS    benzonatate (TESSALON) capsule 200 mg  200 mg Oral TID    potassium chloride SR (KLOR-CON 10) tablet 10 mEq  10 mEq Oral BID    albuterol-ipratropium (DUO-NEB) 2.5 MG-0.5 MG/3 ML  3 mL Nebulization BID RT    buPROPion XL (WELLBUTRIN XL) tablet 150 mg  150 mg Oral DAILY    carvediloL (COREG) tablet 6.25 mg  6.25 mg Oral BID WITH MEALS    fenofibrate nanocrystallized (TRICOR) tablet 145 mg  145 mg Oral DAILY    glimepiride (AMARYL) tablet 4 mg  4 mg Oral ACB&D    hydroCHLOROthiazide (HYDRODIURIL) tablet 25 mg  25 mg Oral DAILY    pantoprazole (PROTONIX) tablet 40 mg  40 mg Oral DAILY    rosuvastatin (CRESTOR) tablet 20 mg  20 mg Oral QHS    sertraline (ZOLOFT) tablet 100 mg  100 mg Oral DAILY    sodium chloride (NS) flush 5-40 mL  5-40 mL IntraVENous Q8H    sodium chloride (NS) flush 5-40 mL  5-40 mL IntraVENous PRN    acetaminophen (TYLENOL) tablet 650 mg  650 mg Oral Q6H PRN    Or    acetaminophen (TYLENOL) suppository 650 mg  650 mg Rectal Q6H PRN    polyethylene glycol (MIRALAX) packet 17 g  17 g Oral DAILY PRN    ondansetron (ZOFRAN ODT) tablet 4 mg  4 mg Oral Q8H PRN    Or    ondansetron (ZOFRAN) injection 4 mg  4 mg IntraVENous Q6H PRN    enoxaparin (LOVENOX) injection 40 mg  40 mg SubCUTAneous DAILY    insulin lispro (HUMALOG) injection   SubCUTAneous AC&HS    glucose chewable tablet 16 g  4 Tablet Oral PRN    dextrose (D50W) injection syrg 12.5-25 g  12.5-25 g IntraVENous PRN    glucagon (GLUCAGEN) injection 1 mg  1 mg IntraMUSCular PRN    azithromycin (ZITHROMAX) tablet 500 mg  500 mg Oral DAILY    montelukast (SINGULAIR) tablet 10 mg  10 mg Oral QHS    LORazepam (ATIVAN) tablet 1 mg  1 mg Oral QHS PRN    benzocaine-menthoL (CHLORASEPTIC MAX) lozenge 1 Lozenge  1 Lozenge Oral Q2H PRN        Objective:   Vitals:  Visit Vitals  /72   Pulse 72   Temp 98.1 °F (36.7 °C)   Resp 21   Ht 5' 3\" (1.6 m)   Wt 199 lb 4.7 oz (90.4 kg)   SpO2 97%   BMI 35.30 kg/m²    O2 Flow Rate (L/min): 1.5 l/min O2 Device: None (Room air) Temp (24hrs), Av.5 °F (36.9 °C), Min:98.1 °F (36.7 °C), Max:98.9 °F (37.2 °C)      Last 24hr Input/Output:    Intake/Output Summary (Last 24 hours) at 12/15/2021 1996  Last data filed at 2021 1848  Gross per 24 hour   Intake 1080 ml   Output    Net 1080 ml        PHYSICAL EXAM:  General:     Alert, cooperative, no distress, appears stated age. Head:    Normocephalic, without obvious abnormality, atraumatic. Eyes:    Conjunctivae/corneas clear. PERRLA  Nose:   Nares normal. No drainage or sinus tenderness. Throat:     Lips, mucosa, and tongue normal.  No Thrush  Neck:   Supple, symmetrical,  no adenopathy, thyroid: non tender     no carotid bruit and no JVD. Back:     Symmetric,  No CVA tenderness. Lungs:    Clear to auscultation bilaterally with mild decreased BS. No Wheezing or Rhonchi.  No rales.  Heart:    Regular rate and rhythm,  no murmur, rub or gallop. Abdomen:    Soft, non-tender. Not distended. Bowel sounds normal. No masses  Extremities:  Extremities normal, atraumatic, No cyanosis. No edema. No clubbing  Lymph nodes:  Cervical, supraclavicular normal.  Neurologic:  General decreased strength, Alert and oriented X 3.    Skin:                 No rash      Lab Data Reviewed:    Recent Results (from the past 24 hour(s))   GLUCOSE, POC    Collection Time: 12/14/21  6:45 AM   Result Value Ref Range    Glucose (POC) 246 (H) 65 - 117 mg/dL    Performed by Dior Albarado PCT    GLUCOSE, POC    Collection Time: 12/14/21 10:37 AM   Result Value Ref Range    Glucose (POC) 245 (H) 65 - 117 mg/dL    Performed by Henry Watts, POC    Collection Time: 12/14/21  3:09 PM   Result Value Ref Range    Glucose (POC) 322 (H) 65 - 117 mg/dL    Performed by Shanna Shepherd    GLUCOSE, POC    Collection Time: 12/14/21  4:21 PM   Result Value Ref Range    Glucose (POC) 257 (H) 65 - 117 mg/dL    Performed by Shanna Shepherd    GLUCOSE, POC    Collection Time: 12/14/21  8:48 PM   Result Value Ref Range    Glucose (POC) 278 (H) 65 - 117 mg/dL    Performed by Dior Albarado PCT    METABOLIC PANEL, BASIC    Collection Time: 12/15/21  1:47 AM   Result Value Ref Range    Sodium 137 136 - 145 mmol/L    Potassium 3.8 3.5 - 5.1 mmol/L    Chloride 103 97 - 108 mmol/L    CO2 28 21 - 32 mmol/L    Anion gap 6 5 - 15 mmol/L    Glucose 338 (H) 65 - 100 mg/dL    BUN 28 (H) 6 - 20 MG/DL    Creatinine 0.84 0.55 - 1.02 MG/DL    BUN/Creatinine ratio 33 (H) 12 - 20      GFR est AA >60 >60 ml/min/1.73m2    GFR est non-AA >60 >60 ml/min/1.73m2    Calcium 9.1 8.5 - 10.1 MG/DL         Assessment/Plan:     Principal Problem:    Acute on chronic respiratory failure (HCC) (12/11/2021)    Active Problems:    Controlled type 2 diabetes mellitus with stage 2 chronic kidney disease, with long-term current use of insulin ([facility]) (4/20/2015)      Hypertension with renal disease (8/24/2017)      CKD (chronic kidney disease), stage II (8/24/2017)      Class 1 obesity due to excess calories without serious comorbidity with body mass index (BMI) of 33.0 to 33.9 in adult (4/20/2015)      ILD (interstitial lung disease) ([facility] (12/11/2021)      Hyperglycemia due to diabetes mellitus ([facility] (12/11/2021)       ___________________________________________________  PLAN:    1. Supplemental oxygen to keep Sat > 90%  2. Changed Solumedrol to Prednisone  3. Continue Duoneb  4. Completed  Zithromax to cover possible secondary infection  5. Increased Lantus for DM control and continue Glimperide  6. Follow BS and cover with SSI  7. Potassium supplementation with hypokalemia, now 3.8  8. Continue Protonix for PUD prevention  9. Continue home Singulair  10. Continue other home meds  11. Plans for SNF Rehab    30  Minutes spent today in direct care of this high complexity patient with greater than 50% in counseling and coordination of care.     If need to contact me use hospital  [phone], DO NOT USE PERFECT SERVE    ___________________________________________________    Attending Physician: Emma Hicks MD

## 2021-12-15 NOTE — PROGRESS NOTES
Problem: Falls - Risk of  Goal: *Absence of Falls  Description: Document Bandar Cease Fall Risk and appropriate interventions in the flowsheet. Outcome: Progressing Towards Goal  Note: Fall Risk Interventions:  Mobility Interventions: Bed/chair exit alarm, Patient to call before getting OOB         Medication Interventions: Bed/chair exit alarm, Patient to call before getting OOB    Elimination Interventions: Bed/chair exit alarm, Call light in reach    History of Falls Interventions: Bed/chair exit alarm, Door open when patient unattended         Problem: Patient Education: Go to Patient Education Activity  Goal: Patient/Family Education  Outcome: Progressing Towards Goal     Problem: Pressure Injury - Risk of  Goal: *Prevention of pressure injury  Description: Document Hari Scale and appropriate interventions in the flowsheet.   Outcome: Progressing Towards Goal  Note: Pressure Injury Interventions:  Sensory Interventions: Assess changes in LOC, Assess need for specialty bed    Moisture Interventions: Absorbent underpads    Activity Interventions: Assess need for specialty bed    Mobility Interventions: Assess need for specialty bed    Nutrition Interventions: Document food/fluid/supplement intake, Offer support with meals,snacks and hydration    Friction and Shear Interventions: HOB 30 degrees or less, Lift sheet                Problem: Patient Education: Go to Patient Education Activity  Goal: Patient/Family Education  Outcome: Progressing Towards Goal     Problem: Breathing Pattern - Ineffective  Goal: *Absence of hypoxia  Outcome: Progressing Towards Goal  Goal: *Use of effective breathing techniques  Outcome: Progressing Towards Goal

## 2021-12-15 NOTE — PROGRESS NOTES
Problem: Mobility Impaired (Adult and Pediatric)  Goal: *Acute Goals and Plan of Care (Insert Text)  Description: FUNCTIONAL STATUS PRIOR TO ADMISSION: Patient was modified independent using a SB quad cane and single point cane for functional mobility. Also owns a RW but states her home is too small to use the RW inside. Reports she has been very sedentary at home and mainly stays in bed. Does not drive anymore and amb mainly short distances secondary to breathing issues. Orders groceries but even finds act of putting groceries away to be exhausting. HOME SUPPORT PRIOR TO ADMISSION: The patient lived alone with no local support. Daughter lives in [place] and only other assistance in neighbor if absolutely needed per patient. Physical Therapy Goals  Initiated 12/13/2021  1. Patient will move from supine to sit and sit to supine  in bed with modified independence within 7 day(s). 2.  Patient will transfer from bed to chair and chair to bed with modified independence using the least restrictive device within 7 day(s). 3.  Patient will perform sit to stand with modified independence within 7 day(s). 4.  Patient will ambulate with modified independence for 100 feet with the least restrictive device within 7 day(s). Outcome: Progressing Towards Goal     PHYSICAL THERAPY TREATMENT  Patient: Parris Santiago (26 y.o. female)  Date: 12/15/2021  Diagnosis: Acute on chronic respiratory failure (HCC) [J96.20]  ILD (interstitial lung disease) ([facility] [J84.9]  [facility]gly[facility] due to [facility] [E11.65] Acute on chronic respiratory failure (HCC)       Precautions: DNR,Fall  Chart, physical therapy assessment, plan of care and goals were reviewed. ASSESSMENT  Patient continues with skilled PT services and is progressing towards goals. Showing progress towards goals and able to tolerate increased activity this date but with decreased insight into her abilities.  Patient motivated for progress and discussed ambulation to the end of the hallway. Gait training completed using RW x160' requiring CGA and 2 standing rest breaks. Cues required for scapular depression and erect posture. Patient's initial goal slightly further than achieved d/t this writer requesting to stop and return to the room with noted patient fatigue. SpO2 observed 89% at lowest and recovered to 91% quickly with a standing rest break. The patient demonstrated good overall safety awareness but discussed progressively increasing activity and encouraged to increase in manageable increments via completing shorter distance more frequently. Noted plan is for patient to discharge home and recommend HHPT follow up. Current Level of Function Impacting Discharge (mobility/balance): CGA for gait over distance    Other factors to consider for discharge: lives alone with limited local support         PLAN :  Patient continues to benefit from skilled intervention to address the above impairments. Continue treatment per established plan of care. to address goals. Recommendation for discharge: (in order for the patient to meet his/her long term goals)  Physical therapy at least 2 days/week in the home     This discharge recommendation:  Has been made in collaboration with the attending provider and/or case management    IF patient discharges home will need the following DME: patient owns DME required for discharge       SUBJECTIVE:   Patient stated Yes, I am starting to get tired.     OBJECTIVE DATA SUMMARY:   Critical Behavior:  Neurologic State: Alert  Orientation Level: Oriented X4  Cognition: Appropriate decision making  Safety/Judgement: Awareness of environment,Home safety,Insight into deficits  Functional Mobility Training:  Bed Mobility:  Rolling: Supervision  Supine to Sit: Supervision  Sit to Supine: Supervision           Transfers:  Sit to Stand: Stand-by assistance  Stand to Sit: Stand-by assistance Balance:  Sitting: Intact  Standing: Impaired; With support  Standing - Static: Good;Constant support  Standing - Dynamic : Good;Fair;Constant support (fair with fatigue)  Ambulation/Gait Training:  Distance (ft): 160 Feet (ft) (2 brief standing rest breaks)  Assistive Device: Gait belt;Walker, rolling  Ambulation - Level of Assistance: Contact guard assistance        Gait Abnormalities: Decreased step clearance;Trunk sway increased              Speed/Vivian: Fluctuations  Step Length: Left shortened;Right shortened  Vivian increased as she fatigued and hurried to take a seated rest       Therapeutic Exercises: Activity Tolerance:   SpO2 stable on RA    After treatment patient left in no apparent distress:   Supine in bed and Call bell within reach    COMMUNICATION/COLLABORATION:   The patients plan of care was discussed with: Registered nurse.      Ashley Cr PT, DPT   Time Calculation: 26 mins

## 2021-12-15 NOTE — PROGRESS NOTES
Spiritual Care Assessment/Progress Note  [facility]      NAME: Parris Santiago      MRN: [id number]  AGE: 78 y.o.  SEX: female  Sabianist Affiliation: Mandaeism   Language: English     12/15/2021     Total Time (in minutes): 7     Spiritual Assessment begun in [facility] through conversation with:         [x]Patient        [] Family    [] Friend(s)        Reason for Consult: Initial/Spiritual assessment, patient floor     Spiritual beliefs: (Please include comment if needed)     [x] Identifies with a ana rosa tradition: Mandaeism        [] Supported by a ana rosa community:            [] Claims no spiritual orientation:           [] Seeking spiritual identity:                [] Adheres to an individual form of spirituality:           [] Not able to assess:                           Identified resources for coping:      [] Prayer                               [] Music                  [] Guided Imagery     [x] Family/friends                 [] Pet visits     [] Devotional reading                         [] Unknown     [] Other:                                              Interventions offered during this visit: (See comments for more details)    Patient Interventions: Affirmation of emotions/emotional suffering,Affirmation of ana rosa,Catharsis/review of pertinent events in supportive environment,Coping skills reviewed/reinforced           Plan of Care:     [] Support spiritual and/or cultural needs    [] Support AMD and/or advance care planning process      [] Support grieving process   [] Coordinate Rites and/or Rituals    [] Coordination with community clergy   [] No spiritual needs identified at this time   [] Detailed Plan of Care below (See Comments)  [] Make referral to Music Therapy  [] Make referral to Pet Therapy     [] Make referral to Addiction services  [] Make referral to [facility]  [] Make referral to Spiritual Care Partner  [] No future visits requested        [x] Contact Spiritual Care for further referrals     Comments:     Initial Spiritual Care Assessment visit for Mrs. Melba Sanchez in 2213. Reviewed the chart before visit. Patient was alert, no family was present during the visit. Patient shared about her current status, told  that she is going home to [place].  checked with her, if she is Sabianist and met Father Maria Alejandra Herron, she answered positive. She had anointing of the sick, she said she is ready to go home.  celebrated her discharge tomorrow, advised of  availability. She was thankful for [employer].      KIN Timmons.Div,Th.M, [place] [address] Provider   Paging Service [phone])

## 2021-12-15 NOTE — PROGRESS NOTES
Problem: Self Care Deficits Care Plan (Adult)  Goal: *Acute Goals and Plan of Care (Insert Text)  Description: FUNCTIONAL STATUS PRIOR TO ADMISSION: Progressive recent decline, client previously was living independently. She was receiving out patient physical therapy, ambulating using a RW, Rollator, quad cane and single point cane. HOME SUPPORT PRIOR TO ADMISSION: The patient lived alone with son to provide assistance. Occupational Therapy Goals:  Initiated 12/14/2021  1. Patient will perform toileting with modified independence within 7 days. 2. Patient will perform lower body dressing with modified independence within 7 days. 3. Patient will perform grooming with modified independence within 7 days. 4. Patient will demonstrate modified independence with light ADL task within 7 days. 5. Patient will transfer from toilet with modified independence using the least restrictive device and appropriate durable medical equipment within 7 days. Outcome: Progressing Towards Goal    OCCUPATIONAL THERAPY TREATMENT  Patient: Rusty Dozier (17 y.o. female)  Date: 12/15/2021  Diagnosis: Acute on chronic respiratory failure (HCC) [J96.20]  ILD (interstitial lung disease) ([facility] [J84.9]  Hyperglycemia due to diabetes mellitus ([facility] [E11.65] Acute on chronic respiratory failure ([facility]       Precautions: DNR,Fall  Chart, occupational therapy assessment, plan of care, and goals were reviewed. ASSESSMENT  Patient continues with skilled OT services and is progressing towards goals. Pt noted with progressive mobility/balance, completing RW level bathroom mobility and standing oral hygiene with initial CGA and progressing to SBA and mild path deviation; however, no LOB. Pt progressing with activity pacing and energy conservation understanding, with IADL energy conservation education/handouts and patient verbalizing good understanding.   Per chart, pt now plans to return home with [facility] services, with CM aware and following. Acute OT to continue to follow during acute hospitalization. Of note, pt noted with NORMAN; however, RA 02 >90% throughout treatment. Current Level of Function Impacting Discharge (ADLs): SBA    Other factors to consider for discharge: Low activity tolerance, lives alone         PLAN :  Patient continues to benefit from skilled intervention to address the above impairments. Continue treatment per established plan of care to address goals. Recommend with staff: OOB meals, Active ADL engagement, SBA>CGA with RW toileting    Recommend next OT session: POC progression    Recommendation for discharge: (in order for the patient to meet his/her long term goals)  Occupational therapy at least 2 days/week in the home     This discharge recommendation:  Has been made in collaboration with the attending provider and/or case management    IF patient discharges home will need the following DME: patient owns DME required for discharge       SUBJECTIVE:   Patient stated [facility] for your help, I appreciate your help.     OBJECTIVE DATA SUMMARY:   Cognitive/Behavioral Status:  Neurologic State: Alert  Orientation Level: Oriented X4  Cognition: Appropriate decision making  Perception: Appears intact  Perseveration: No perseveration noted  Safety/Judgement: Awareness of environment;Home safety; Insight into deficits    Functional Mobility and Transfers for ADLs:  Bed Mobility:  Rolling: Supervision  Supine to Sit: Supervision  Sit to Supine: Supervision    Transfers:  Sit to Stand: Stand-by assistance  Functional Transfers  Bathroom Mobility: Stand-by assistance     Pt educated on safe transfer techniques, with specific emphasis on proper hand placement to push up from seated surface rather than attempt to pull self up, fully positioning self in-front of desired seated location, feeling chair on back of legs and reaching back with 1-2 UE to slowly lower self to seated position.     Balance:  Sitting: Intact  Standing: Impaired; With support  Standing - Static: Good;Constant support  Standing - Dynamic : Good;Fair;Constant support    ADL Intervention:  Grooming  Grooming Assistance: Stand-by assistance  Position Performed: Standing (RW)  Brushing Teeth: Stand-by assistance    Cognitive Retraining  Safety/Judgement: Awareness of environment;Home safety; Insight into deficits    Pain:  No c/o pain    Activity Tolerance:   Fair, Poor, and requires frequent rest breaks    After treatment patient left in no apparent distress:   Supine in bed, Call bell within reach, and Side rails x 3    COMMUNICATION/COLLABORATION:   The patients plan of care was discussed with: Registered nurse and Case management.      Farida Hilliard OT  Time Calculation: 26 mins

## 2021-12-15 NOTE — PROGRESS NOTES
Transition of Care Plan:     RUR: 12% low risk  Disposition: home with [facility]  Follow up appointments: PCP, specialists as indicated after rehab   DME needed: N/A, has a RW, rollator, 2 canes, raised toilet seat   Transportation at [place] [address]. or means to access home:  yes      IM Medicare Letter: to be reviewed prior to d/c   Is patient a BCPI-A Bundle: No                  If yes, was Bundle Letter given?:    Is patient a [place] and connected with the [address] [place] [address] Road  If yes, was [facility] transfer form completed and [facility] notified? Caregiver Contact: Antonio Vincent (son) [phone]  Discharge Caregiver contacted prior to discharge?  To be contacted prior to d/c    Update 1230: The pt has been accepted by [facility]. Pt notified. Initial note:  Chart reviewed. CM received a call from Elvia Vasquez yesterday 12/14 that the pt would not be able to leave the facility for [place] due to Covid. CM met with pt to discuss d/c options. She has decided to go home with home health. Facilities notified. Referrals sent to At [address] [place] [address], [facility], [facility], and [facility]. FOC signed and placed on chart. CM will continue to monitor for d/c needs.     Sedalia Osler, MSN  Care Manager  [phone]

## 2021-12-16 VITALS
HEART RATE: 62 BPM | DIASTOLIC BLOOD PRESSURE: 73 MMHG | TEMPERATURE: 98 F | SYSTOLIC BLOOD PRESSURE: 148 MMHG | BODY MASS INDEX: 35.31 KG/M2 | HEIGHT: 63 IN | RESPIRATION RATE: 16 BRPM | OXYGEN SATURATION: 96 % | WEIGHT: 199.3 LBS

## 2021-12-16 PROBLEM — J84.9 ILD (INTERSTITIAL LUNG DISEASE) (HCC): Status: RESOLVED | Noted: 2021-12-11 | Resolved: 2021-12-16

## 2021-12-16 LAB
ANION GAP SERPL CALC-SCNC: 6 MMOL/L (ref 5–15)
BACTERIA SPEC CULT: NORMAL
BUN SERPL-MCNC: 25 MG/DL (ref 6–20)
BUN/CREAT SERPL: 26 (ref 12–20)
CALCIUM SERPL-MCNC: 9.8 MG/DL (ref 8.5–10.1)
CHLORIDE SERPL-SCNC: 103 MMOL/L (ref 97–108)
CO2 SERPL-SCNC: 28 MMOL/L (ref 21–32)
CREAT SERPL-MCNC: 0.95 MG/DL (ref 0.55–1.02)
GLUCOSE BLD STRIP.AUTO-MCNC: 142 MG/DL (ref 65–117)
GLUCOSE SERPL-MCNC: 205 MG/DL (ref 65–100)
POTASSIUM SERPL-SCNC: 3.8 MMOL/L (ref 3.5–5.1)
SERVICE CMNT-IMP: ABNORMAL
SERVICE CMNT-IMP: NORMAL
SODIUM SERPL-SCNC: 137 MMOL/L (ref 136–145)

## 2021-12-16 PROCEDURE — 80048 BASIC METABOLIC PNL TOTAL CA: CPT

## 2021-12-16 PROCEDURE — 74011250637 HC RX REV CODE- 250/637: Performed by: INTERNAL MEDICINE

## 2021-12-16 PROCEDURE — 74011250637 HC RX REV CODE- 250/637: Performed by: FAMILY MEDICINE

## 2021-12-16 PROCEDURE — 74011636637 HC RX REV CODE- 636/637: Performed by: INTERNAL MEDICINE

## 2021-12-16 PROCEDURE — 82962 GLUCOSE BLOOD TEST: CPT

## 2021-12-16 PROCEDURE — 74011000250 HC RX REV CODE- 250: Performed by: INTERNAL MEDICINE

## 2021-12-16 PROCEDURE — 94640 AIRWAY INHALATION TREATMENT: CPT

## 2021-12-16 PROCEDURE — 74011250636 HC RX REV CODE- 250/636: Performed by: INTERNAL MEDICINE

## 2021-12-16 PROCEDURE — 74011636637 HC RX REV CODE- 636/637: Performed by: PHYSICIAN ASSISTANT

## 2021-12-16 PROCEDURE — 99239 HOSP IP/OBS DSCHRG MGMT >30: CPT | Performed by: INTERNAL MEDICINE

## 2021-12-16 PROCEDURE — 36415 COLL VENOUS BLD VENIPUNCTURE: CPT

## 2021-12-16 RX ORDER — ALBUTEROL SULFATE 90 UG/1
1 AEROSOL, METERED RESPIRATORY (INHALATION)
Qty: 18 G | Refills: 5 | Status: SHIPPED | OUTPATIENT
Start: 2021-12-16

## 2021-12-16 RX ORDER — INSULIN GLARGINE 100 [IU]/ML
INJECTION, SOLUTION SUBCUTANEOUS
Qty: 5 PEN | Refills: 5 | Status: SHIPPED | OUTPATIENT
Start: 2021-12-16 | End: 2022-02-09 | Stop reason: ALTCHOICE

## 2021-12-16 RX ORDER — INSULIN GLARGINE 100 [IU]/ML
INJECTION, SOLUTION SUBCUTANEOUS
Qty: 10 PEN | Refills: 3 | Status: SHIPPED | OUTPATIENT
Start: 2021-12-16 | End: 2022-03-07 | Stop reason: SDUPTHER

## 2021-12-16 RX ORDER — PREDNISONE 20 MG/1
TABLET ORAL
Qty: 90 TABLET | Refills: 3 | Status: SHIPPED | OUTPATIENT
Start: 2021-12-16 | End: 2022-01-10 | Stop reason: SDUPTHER

## 2021-12-16 RX ADMIN — INSULIN GLARGINE 80 UNITS: 100 INJECTION, SOLUTION SUBCUTANEOUS at 08:31

## 2021-12-16 RX ADMIN — IPRATROPIUM BROMIDE AND ALBUTEROL SULFATE 3 ML: .5; 3 SOLUTION RESPIRATORY (INHALATION) at 08:07

## 2021-12-16 RX ADMIN — Medication 10 ML: at 06:08

## 2021-12-16 RX ADMIN — SERTRALINE 100 MG: 50 TABLET, FILM COATED ORAL at 08:29

## 2021-12-16 RX ADMIN — PANTOPRAZOLE SODIUM 40 MG: 40 TABLET, DELAYED RELEASE ORAL at 08:29

## 2021-12-16 RX ADMIN — ENOXAPARIN SODIUM 40 MG: 100 INJECTION SUBCUTANEOUS at 08:29

## 2021-12-16 RX ADMIN — HYDROCHLOROTHIAZIDE 25 MG: 25 TABLET ORAL at 08:29

## 2021-12-16 RX ADMIN — FENOFIBRATE 145 MG: 145 TABLET ORAL at 08:29

## 2021-12-16 RX ADMIN — GLIMEPIRIDE 4 MG: 4 TABLET ORAL at 08:38

## 2021-12-16 RX ADMIN — CARVEDILOL 6.25 MG: 6.25 TABLET, FILM COATED ORAL at 08:29

## 2021-12-16 RX ADMIN — PREDNISONE 40 MG: 20 TABLET ORAL at 08:29

## 2021-12-16 RX ADMIN — INSULIN LISPRO 3 UNITS: 100 INJECTION, SOLUTION INTRAVENOUS; SUBCUTANEOUS at 07:30

## 2021-12-16 RX ADMIN — BENZONATATE 200 MG: 100 CAPSULE ORAL at 08:29

## 2021-12-16 RX ADMIN — POTASSIUM CHLORIDE 10 MEQ: 750 TABLET, FILM COATED, EXTENDED RELEASE ORAL at 08:29

## 2021-12-16 RX ADMIN — BUPROPION HYDROCHLORIDE 150 MG: 150 TABLET, EXTENDED RELEASE ORAL at 08:29

## 2021-12-16 NOTE — DISCHARGE INSTRUCTIONS
[address]  [phone]      Patient Discharge Instructions    Shari Lr / [id number] : 1945    Admitted 2021 Discharged: 2021     Principal Problem:    Acute on chronic respiratory failure ([facility] (2021)    Active Problems:    Controlled type 2 diabetes mellitus with stage 2 chronic kidney disease, with long-term current use of insulin ([facility] (2015)      Hypertension with renal disease (2017)      CKD (chronic kidney disease), stage II (2017)      Class 1 obesity due to excess calories without serious comorbidity with body mass index (BMI) of 33.0 to 33.9 in adult (2015)      ILD (interstitial lung disease) ([facility] (2021)      Hyperglycemia due to diabetes mellitus ([facility] (2021)          Allergies   Allergen Reactions    Metformin Diarrhea    Statins-Hmg-Coa Reductase Inhibitors Myalgia     Myalgia with  multiple statins  Takes pravachol     Codeine Rash     Rash on thighs    Darvon [Propoxyphene] Other (comments)     \"I see worms\"    Pcn [Penicillins] Rash    Sulfa (Sulfonamide Antibiotics) Rash       · It is important that you take the medication exactly as they are prescribed. · Do not take other medications without consulting your doctor. What to do at Next Level of Care    Disposition: Home    Recommended diet  Diabetic    Recommended activity: As tolerated          Information obtained by :  I understand that if any problems occur once I am at home I am to contact my physician. I understand and acknowledge receipt of the instructions indicated above.                                                                                                                                            Physician's or R.N.'s Signature                                                                  Date/Time Patient or Representative Signature                                                          Date/Time

## 2021-12-16 NOTE — PROGRESS NOTES
Transition of Care Plan:     RUR: 12% low risk  Disposition: home with [address]  Follow up appointments: PCP, specialists as indicated   DME needed: N/A, has a RW, rollator, 2 canes, raised toilet seat   Transportation at Discharge: a friend will transport at [address] or means to access home:  yes      IM Medicare Letter: given 12/16/21  Is patient a BCPI-A Bundle: No                  If yes, was Bundle Letter given?:    Is patient a  and connected with the [address] [place] [address] Road  If yes, was [employer] transfer form completed and VA notified? Caregiver Contact: Antonio Sarmiento (son) [phone]  Discharge Caregiver contacted prior to discharge?  To be contacted prior to d/c    Initial note: Chart reviewed. CM met with pt to discuss d/c plan. HH and MD appt reviewed. She is agreeable to d/c. Her friend will be here to transport at 6. CM suggested that she could leave sooner once her d/c paperwork was reviewed. Medicare pt has received, reviewed, and signed 2nd  letter informing them of their right to appeal the discharge. Signed copy has been placed on pt bedside chart. Pt is ready to go from a CM standpoint. Care Management Interventions  PCP Verified by CM: Yes  Palliative Care Criteria Met (RRAT>21 & CHF Dx)?: No  Mode of Transport at Discharge:  Other (see comment) (a friend is picking her up)  Transition of Care Consult (CM Consult): Discharge Planning  Discharge Durable Medical Equipment: No  Physical Therapy Consult: Yes  Occupational Therapy Consult: Yes  Speech Therapy Consult: No  Support Systems: Friend/Neighbor,Child(silas)  Confirm Follow Up Transport: Friends  The Plan for Transition of Care is Related to the Following Treatment Goals : SNF  The Patient and/or Patient Representative was Provided with a Choice of Provider and Agrees with the Discharge Plan?: Yes  Name of the Patient Representative Who was Provided with a Choice of Provider and Agrees with the Discharge Plan: Moni Borjas of Choice List was Provided with Basic Dialogue that Supports the Patient's Individualized Plan of Care/Goals, Treatment Preferences and Shares the Quality Data Associated with the Providers?: Yes   Resource Information Provided?: No  Discharge Location  Discharge Placement: Home with home health     JASON Noriega  Care Manager  [phone]

## 2021-12-16 NOTE — PROGRESS NOTES
DISCHARGE SUMMARY FROM [facility] NURSE    0900: The patient is stable for discharge. I have reviewed the discharge instructions with the patient. The patient verbalized understanding. All questions were fully answered. The patient verbalized no complaints. No Hard scripts given but medication handouts were given and reviewed with the patient. Appropriate educational materials and medication side effects teaching were also provided. Cardiac monitor was removed. The following personal items collected during admission were returned to the patient/family  Home medications: None  Dental Appliance: Dental Appliances: None  Vision:  None  Hearing Aid:   None  Jewelry: Jewelry: None  Clothing: Clothing: None  Other Valuables: Other Valuables: None  Valuables sent to safe:  None    There were no personal belongings, valuables or home medications left at patient's bedside,  or safe. 3943: Clarified d/c instructions with Dr. Pilar Flowers and made some changes on meds. Per MD, ok to shower. Per Dr. Lucas Armendariz, do not take insulin glargin instead continue taking home meds Lantus Solostar.  Informed patient of the change and documented in the AVS.  Patient verbalized understanding

## 2021-12-16 NOTE — PROGRESS NOTES
0700: Bedside shift change report given to Leonela Almonte (oncoming nurse) by Arash Spears (offgoing nurse). Report included the following information SBAR and Kardex. 0945: TRANSFER - OUT REPORT:    Verbal report given to Radha DOE (name) on Twyla Oliver  being transferred to [facility] (unit) for routine progression of care       Report consisted of patients Situation, Background, Assessment and   Recommendations(SBAR). Information from the following report(s) SBAR and Kardex was reviewed with the receiving nurse. Lines:   Peripheral IV 12/12/21 Right Antecubital (Active)   Site Assessment Clean, dry, & intact 12/16/21 0325   Phlebitis Assessment 0 12/16/21 0325   Infiltration Assessment 0 12/16/21 0325   Dressing Status Clean, dry, & intact 12/16/21 0325   Dressing Type Transparent 12/16/21 0325   Hub Color/Line Status Blue 12/16/21 0325        Opportunity for questions and clarification was provided.       Patient transported with:   [employer]

## 2021-12-16 NOTE — PROGRESS NOTES
Patient ride at the front entrance of the hospital. Patient wheeled down for discharge with all belongings and discharge paperwork.

## 2021-12-17 NOTE — TELEPHONE ENCOUNTER
Paola called regarding patient. D/c from hospital a few days ago. Checking on now. States her vitals were fine. Thinks her blood sugar dropped this morning but the nurse checked it and it was 127. Still with some SOB, cough with white frothy sputum. No fever. States they will continue to follow up until 2-. Rescheduled her appointment for 12-.

## 2021-12-17 NOTE — DISCHARGE SUMMARY
[address] SUMMARY    Name:  Violeta Harding  MR#:  [id number]  :  1945  ACCOUNT #:  [de-identified]  ADMIT DATE:  2021  DISCHARGE DATE:  2021    FINAL DIAGNOSES:  1. Acute-on-chronic respiratory failure. 2.  Restricted lung disease, severe. 3.  Diabetes. 4.  Hypertension. 5.  Chronic kidney disease, stage II. 6.  Obesity. 7.  Hyperglycemia on admission secondary to diabetes mellitus, exacerbated by high-dose steroids. CONSULTATIONS:  Pulmonary, Susanna Jimenez MD    PROCEDURES:  None. For details of admission history and physical, please see admit note. HOSPITAL COURSE:  The patient is a 81-year-old white female, who was admitted to the hospital with increasing shortness of breath and respiratory failure. She was started on IV Solu-Medrol as well as nebulizer treatments and empiric treatment with Zithromax. She had improvement in her respiratory status. She was seen by Physical Therapy and ambulated with some mild desaturation, but not enough to require home oxygen. She was converted to p.o. prednisone and it was felt that at this time that maximal hospital benefit has been achieved. She will be discharged home with continued clemastine 2.6 mg tablet b.i.d., Cleocin 300 mg 2 capsules one hour before dental procedure, Januvia 100 mg daily, Metformin ER 2 tablets with dinner 1500 mg, Serax 50 mg 2 capsules at bedtime, TriCor 145 mg daily, Crestor 20 mg daily, Tessalon 200 mg t.i.d. p.r.n., Wellbutrin  mg daily, Coreg 6.25 b.i.d., Amaryl 4 mg b.i.d., HydroDIURIL 25 mg daily, Singulair 10 mg daily, Protonix 40 mg daily, and Zoloft 100 mg daily. Changes in medication, her prednisone was increased to 40 mg daily, her insulin was increased to 50 units daily, Lantus. She will stop taking doxycycline that she was on prior to admission. She will start on ProAir inhaler 2 puffs q.i.d. p.r.n.     Thiry five minutes spent on direct care of this patient at the time of discharge today. Follow up with me in 1-2 days.       Risa Esquivel MD      [id number]  D:  12/16/2021 8:02  T:  12/16/2021 22:35  JOB #:  [id number]  CC:  MD Norberto Ware

## 2021-12-21 ENCOUNTER — OFFICE VISIT (OUTPATIENT)
Dept: INTERNAL MEDICINE CLINIC | Age: 76
End: 2021-12-21
Payer: MEDICARE

## 2021-12-21 VITALS
OXYGEN SATURATION: 94 % | HEIGHT: 63 IN | HEART RATE: 94 BPM | TEMPERATURE: 97.9 F | RESPIRATION RATE: 19 BRPM | WEIGHT: 196.3 LBS | DIASTOLIC BLOOD PRESSURE: 88 MMHG | SYSTOLIC BLOOD PRESSURE: 124 MMHG | BODY MASS INDEX: 34.78 KG/M2

## 2021-12-21 DIAGNOSIS — Z79.4 CONTROLLED TYPE 2 DIABETES MELLITUS WITH STAGE 2 CHRONIC KIDNEY DISEASE, WITH LONG-TERM CURRENT USE OF INSULIN (HCC): ICD-10-CM

## 2021-12-21 DIAGNOSIS — E11.65 HYPERGLYCEMIA DUE TO DIABETES MELLITUS (HCC): ICD-10-CM

## 2021-12-21 DIAGNOSIS — E66.09 CLASS 1 OBESITY DUE TO EXCESS CALORIES WITHOUT SERIOUS COMORBIDITY WITH BODY MASS INDEX (BMI) OF 33.0 TO 33.9 IN ADULT: ICD-10-CM

## 2021-12-21 DIAGNOSIS — N18.2 CKD (CHRONIC KIDNEY DISEASE), STAGE II: ICD-10-CM

## 2021-12-21 DIAGNOSIS — E11.22 CONTROLLED TYPE 2 DIABETES MELLITUS WITH STAGE 2 CHRONIC KIDNEY DISEASE, WITH LONG-TERM CURRENT USE OF INSULIN (HCC): ICD-10-CM

## 2021-12-21 DIAGNOSIS — J96.21 ACUTE ON CHRONIC RESPIRATORY FAILURE WITH HYPOXIA (HCC): Primary | ICD-10-CM

## 2021-12-21 DIAGNOSIS — I12.9 HYPERTENSION WITH RENAL DISEASE: ICD-10-CM

## 2021-12-21 DIAGNOSIS — Z09 HOSPITAL DISCHARGE FOLLOW-UP: ICD-10-CM

## 2021-12-21 DIAGNOSIS — J84.9 INTERSTITIAL LUNG DISEASE (HCC): ICD-10-CM

## 2021-12-21 DIAGNOSIS — N18.2 CONTROLLED TYPE 2 DIABETES MELLITUS WITH STAGE 2 CHRONIC KIDNEY DISEASE, WITH LONG-TERM CURRENT USE OF INSULIN (HCC): ICD-10-CM

## 2021-12-21 LAB
ANION GAP SERPL CALC-SCNC: 9 MMOL/L (ref 5–15)
BUN SERPL-MCNC: 28 MG/DL (ref 6–20)
BUN/CREAT SERPL: 31 (ref 12–20)
CALCIUM SERPL-MCNC: 10.1 MG/DL (ref 8.5–10.1)
CHLORIDE SERPL-SCNC: 105 MMOL/L (ref 97–108)
CO2 SERPL-SCNC: 28 MMOL/L (ref 21–32)
CREAT SERPL-MCNC: 0.9 MG/DL (ref 0.55–1.02)
GLUCOSE SERPL-MCNC: 289 MG/DL (ref 65–100)
POTASSIUM SERPL-SCNC: 3.9 MMOL/L (ref 3.5–5.1)
SODIUM SERPL-SCNC: 142 MMOL/L (ref 136–145)

## 2021-12-21 PROCEDURE — 99496 TRANSJ CARE MGMT HIGH F2F 7D: CPT | Performed by: INTERNAL MEDICINE

## 2021-12-21 PROCEDURE — 1111F DSCHRG MED/CURRENT MED MERGE: CPT | Performed by: INTERNAL MEDICINE

## 2021-12-21 PROCEDURE — G8427 DOCREV CUR MEDS BY ELIG CLIN: HCPCS | Performed by: INTERNAL MEDICINE

## 2021-12-21 NOTE — PROGRESS NOTES
Chief Complaint   Patient presents with   [place] 12/11-12/16     Visit Vitals  /88 (BP 1 Location: Left upper arm, BP Patient Position: Sitting, BP Cuff Size: Large adult)   Pulse 94   Temp 97.9 °F (36.6 °C) (Oral)   Resp 19   Ht 5' 3\" (1.6 m)   Wt 196 lb 4.8 oz (89 kg)   SpO2 94%   BMI 34.77 kg/m²     1. Have you been to the ER, urgent care clinic since your last visit? Hospitalized since your last visit? ED [facility] 12/11-12/16/21 for respiratory failure    2. Have you seen or consulted any other health care providers outside of the [address] [place] [address] since your last visit? Include any pap smears or colon screening.  no

## 2021-12-21 NOTE — PATIENT INSTRUCTIONS
Body Mass Index: Care Instructions  Your Care Instructions     Body mass index (BMI) can help you see if your weight is raising your risk for health problems. It uses a formula to compare how much you weigh with how tall you are. · A BMI lower than 18.5 is considered underweight. · A BMI between 18.5 and 24.9 is considered healthy. · A BMI between 25 and 29.9 is considered overweight. A BMI of 30 or higher is considered obese. If your BMI is in the normal range, it means that you have a lower risk for weight-related health problems. If your BMI is in the overweight or obese range, you may be at increased risk for weight-related health problems, such as high blood pressure, heart disease, stroke, arthritis or joint pain, and diabetes. If your BMI is in the underweight range, you may be at increased risk for health problems such as fatigue, lower protection (immunity) against illness, muscle loss, bone loss, hair loss, and hormone problems. BMI is just one measure of your risk for weight-related health problems. You may be at higher risk for health problems if you are not active, you eat an unhealthy diet, or you drink too much alcohol or use tobacco products. Follow-up care is a key part of your treatment and safety. Be sure to make and go to all appointments, and call your doctor if you are having problems. It's also a good idea to know your test results and keep a list of the medicines you take. How can you care for yourself at home? · Practice healthy eating habits. This includes eating plenty of fruits, vegetables, whole grains, lean protein, and low-fat dairy. · If your doctor recommends it, get more exercise. Walking is a good choice. Bit by bit, increase the amount you walk every day. Try for at least 30 minutes on most days of the week. · Do not smoke. Smoking can increase your risk for health problems. If you need help quitting, talk to your doctor about stop-smoking programs and medicines. These can increase your chances of quitting for good. · Limit alcohol to 2 drinks a day for men and 1 drink a day for women. Too much alcohol can cause health problems. If you have a BMI higher than 25  · Your doctor may do other tests to check your risk for weight-related health problems. This may include measuring the distance around your waist. A waist measurement of more than 40 inches in men or 35 inches in women can increase the risk of weight-related health problems. · Talk with your doctor about steps you can take to stay healthy or improve your health. You may need to make lifestyle changes to lose weight and stay healthy, such as changing your diet and getting regular exercise. If you have a BMI lower than 18.5  · Your doctor may do other tests to check your risk for health problems. · Talk with your doctor about steps you can take to stay healthy or improve your health. You may need to make lifestyle changes to gain or maintain weight and stay healthy, such as getting more healthy foods in your diet and doing exercises to build muscle. Where can you learn more? Go to http://www.devine.com/  Enter S176 in the search box to learn more about \"Body Mass Index: Care Instructions. \"  Current as of: March 17, 2021               Content Version: 13.0  © 2006-2021 [employer]. Care instructions adapted under license by [employer] (which disclaims liability or warranty for this information). If you have questions about a medical condition or this instruction, always ask your healthcare professional. [place] [address] any warranty or liability for your use of this information.

## 2021-12-21 NOTE — PROGRESS NOTES
Transitions Of Care [facility], [facility] -)       HPI:  Grace Strickland is a 78y.o. year old female who is here for a follow up visit for hospitalization transition of care. She was last seen by me on 2021 at the office and on 2021 at time of hospital discharge. Discharged on: 2021    Diagnosis in hospital:  1. Acute on chronic respiratory failure  2. Restrictive lung disease severe  3. Diabetes  4. Hypertension  5 hyperglycemia on admission secondary to high-dose steroids  6. Chronic kidney disease stage II  7. Obesity    Complications in hospital: No complications    Medication changes: Prednisone was increased to 40 mg daily, insulin was increased to 50 units daily, Lantus doxycycline was discontinued, ProAir inhaler 2 puffs 4 times daily as needed was started    Discharge Summary reviewed. Today 2021      She reports the following: She notes since discharge she still has marked dyspnea with any type of activity but she did not have a low enough oxygen to qualify for home oxygen upon discharge. She does note that her energy level is markedly decreased. She is also had the extra stress that her brother  today after she was discharged from the hospital.  She still has some cough but this is nonproductive. She notes no fevers or chills. She notes no polyuria polydipsia visual changes consistent with diabetes been markedly out-of-control. She denies any GI or  complaints. She notes no headaches, dizziness or neurologic complaints except generalized weakness. She had no change of her chronic arthritic complaints and and no other complaints noted.           Visit Vitals  /88 (BP 1 Location: Left upper arm, BP Patient Position: Sitting, BP Cuff Size: Large adult)   Pulse 94   Temp 97.9 °F (36.6 °C) (Oral)   Resp 19   Ht 5' 3\" (1.6 m)   Wt 196 lb 4.8 oz (89 kg)   SpO2 94%   BMI 34.77 kg/m²       Historical Data    Past Medical History:   Diagnosis Date    Abnormal LFTs 08/24/2017    FATTY LIVER    Arthritis     OSTEO    Avascular necrosis of hip ([address] 08/24/2017    BILAT HIPS    Breast CA ([address] 1989, 2001    BILATERAL; SURGERY, CHEMO    Chronic pain     CKD (chronic kidney disease), stage II 8/24/2017    Coagulation disorder ([address] 1984    ITP  (DR CHU BRADSHAW) - PLATELETS DROPPED TO 5K    Depression     Diabetes ([facility]     TYPE 2; NIDDM    DJD (degenerative joint disease), multiple sites 8/24/2017    GI bleed 2008    HEMORRHOIDS    Hyperlipemia     Hypertension with renal disease 8/24/2017    IBS (irritable bowel syndrome) 8/24/2017    Ill-defined condition 2015    PNEUMONIA X2 - HOSPITALIZED IN 2015    Interstitial lung disease ([facility] 04/01/2019    Liver disease     FATTY LIVER    Myalgia 8/24/2017    On statin therapy 8/24/2017    Overactive bladder 8/24/2017    Pneumonia 04/2015    HOSPITALIZED 3 WEEKS.     Polymyalgia rheumatica ([address] 8/24/2017    Prophylactic antibiotic 8/24/2017    Reflex abnormality     acid reflex    Rosacea        Past Surgical History:   Procedure Laterality Date    HX APPENDECTOMY  1950    HX BREAST BIOPSY Bilateral     HX CATARACT REMOVAL Bilateral     HX COLONOSCOPY      HX ENDOSCOPY      HX GI      COLONOSCOPY    HX HEENT      WISDOM TEETH    HX HYSTERECTOMY  2000S    HX MASTECTOMY Right 1989    HX MASTECTOMY Left 2001    HX ORTHOPAEDIC  2008    LUMBAR FUSION    HX ORTHOPAEDIC  2018    RE-DO LUMBAR FUSION, AND ADDED THORACIC FUSION    HX ORTHOPAEDIC  03/26/2019    neckectomy    HX TUBAL LIGATION  1981    HX UROLOGICAL  2000s    BLADDER SLING    NV BREAST SURGERY PROCEDURE UNLISTED  1993, 2002    RECONSTRUCTION X2    NV TOTAL HIP ARTHROPLASTY Left 11/16/2016    ANTERIOR APPROACH, DR Gwendolyn De La Torre; (POSTOP: STANDS ONE INCH TALLER ON LEFT FOOT)    NV TOTAL HIP ARTHROPLASTY Right 12/2016    ANTERIOR APPROACH (DR Gwendolyn De La Torre)       Outpatient Encounter Medications as of 12/21/2021   Medication Sig Dispense Refill    insulin glargine (LANTUS,BASAGLAR) 100 unit/mL (3 mL) inpn Take 50 units daily 5 Pen 5    insulin glargine (Lantus Solostar U-100 Insulin) 100 unit/mL (3 mL) inpn INJECT 50 UNITS DAILY 10 Pen 3    predniSONE (DELTASONE) 20 mg tablet Take two tablets daily 90 Tablet 3    albuterol (ProAir HFA) 90 mcg/actuation inhaler Take 1 Puff by inhalation every four (4) hours as needed for Wheezing or Shortness of Breath. 18 g 5    glimepiride (AMARYL) 4 mg tablet TAKE 1 TABLET TWICE A DAY (DOSE/DIRECTIONS CHANGED) 180 Tablet 3    rosuvastatin (CRESTOR) 20 mg tablet Take 1 Tablet by mouth nightly. 90 Tablet 3    buPROPion XL (WELLBUTRIN XL) 150 mg tablet TAKE 1 TABLET DAILY 90 Tablet 3    benzonatate (TESSALON) 200 mg capsule TAKE 1 CAPSULE BY MOUTH THREE TIMES DAILY AS NEEDED FOR COUGH - SWALLOW CAPSULE WHOLE (DO NOT CRUSH) 60 Capsule 0    Januvia 100 mg tablet TAKE 1 TABLET DAILY 90 Tablet 3    sertraline (ZOLOFT) 100 mg tablet TAKE 1 TABLET DAILY 90 Tablet 3    carvediloL (COREG) 6.25 mg tablet TAKE 1 TABLET TWICE A  Tablet 3    BD Ultra-Fine Short Pen Needle 31 gauge x 5/16\" ndle USE UNDER THE SKIN DAILY. USE WITH LANTUS FLEX PEN DAILY 1 Package 3    fenofibrate nanocrystallized (TRICOR) 145 mg tablet Take 1 Tablet by mouth daily. 90 Tablet 3    hydroCHLOROthiazide (HYDRODIURIL) 25 mg tablet TAKE 1 TABLET DAILY FOR FLUID AND BLOOD PRESSURE 90 Tab 3    glucose blood VI test strips (True Metrix Glucose Test Strip) strip USE ONCE DAILY AND RECORD RESULTS IN A NOTEBOOK ALSO RECORD LAST MEALTIME IN NOTEBOOK 100 Strip 3    clemastine 2.68 mg tab tablet Take 2.68 mg by mouth two (2) times a day.  oxazepam (SERAX) 15 mg capsule TAKE 2 CAPSULES BY MOUTH NIGHTLY AT BEDTIME 180 Cap 1    clindamycin (CLEOCIN) 300 mg capsule Take 2 capsules 1 hour before dental procedure 10 Cap 0    pantoprazole (PROTONIX) 40 mg tablet Take 40 mg by mouth daily.  montelukast sodium (SINGULAIR PO) Take 10 mg by mouth nightly.  10 mg tab       No facility-administered encounter medications on file as of 12/21/2021. Allergies   Allergen Reactions    Metformin Diarrhea    Statins-Hmg-Coa Reductase Inhibitors Myalgia     Myalgia with  multiple statins  Takes pravachol     Codeine Rash     Rash on thighs    Darvon [Propoxyphene] Other (comments)     \"I see worms\"    Pcn [Penicillins] Rash    Sulfa (Sulfonamide Antibiotics) Rash        Social History     Socioeconomic History    Marital status:      Spouse name: Not on file    Number of children: Not on file    Years of education: Not on file    Highest education level: Not on file   Occupational History    Not on file   Tobacco Use    Smoking status: Never Smoker    Smokeless tobacco: Never Used   Vaping Use    Vaping Use: Never used   Substance and Sexual Activity    Alcohol use: No    Drug use: No    Sexual activity: Never   Other Topics Concern    Not on file   Social History Narrative    Not on file     Social Determinants of Health     Financial Resource Strain:     Difficulty of Paying Living Expenses: Not on file   Food Insecurity:     Worried About Running Out of Food in the Last Year: Not on file    Denisse of Food in the Last Year: Not on file   Transportation Needs:     Lack of Transportation (Medical): Not on file    Lack of Transportation (Non-Medical):  Not on file   Physical Activity:     Days of Exercise per Week: Not on file    Minutes of Exercise per Session: Not on file   Stress:     Feeling of Stress : Not on file   Social Connections:     Frequency of Communication with Friends and Family: Not on file    Frequency of Social Gatherings with Friends and Family: Not on file    Attends Orthodox Services: Not on file    Active Member of Clubs or Organizations: Not on file    Attends Club or Organization Meetings: Not on file    Marital Status: Not on file   Intimate Partner Violence:     Fear of Current or Ex-Partner: Not on file  Emotionally Abused: Not on file    Physically Abused: Not on file    Sexually Abused: Not on file   Housing Stability:     Unable to Pay for Housing in the Last Year: Not on file    Number of Places Lived in the Last Year: Not on file    Unstable Housing in the Last Year: Not on file      REVIEW OF SYSTEMS:  General: negative for - chills or fever  ENT: negative for - headaches, nasal congestion or tinnitus  Eyes: no blurred or visual changes  Neck: No stiffness or swollen nodes  Respiratory: negative for - cough, hemoptysis or wheezing. Positive for some shortness of breath at rest and marked dyspnea on exertion  Cardiovascular : negative for - chest pain, edema, palpitations or shortness of breath  Gastrointestinal: negative for - abdominal pain, blood in stools, heartburn or nausea/vomiting  Genito-Urinary: no dysuria, trouble voiding, or hematuria  Musculoskeletal: negative for - gait disturbance, joint pain, joint stiffness or joint swelling  Neurological: no TIA or stroke symptoms  Hematologic: no bruises, no bleeding  Lymphatic: no swollen glands  Integument: no lumps, mole changes, nail changes or rash  Endocrine:no malaise/lethargy poly uria or polydipsia or unexpected weight changes      Visit Vitals  /88 (BP 1 Location: Left upper arm, BP Patient Position: Sitting, BP Cuff Size: Large adult)   Pulse 94   Temp 97.9 °F (36.6 °C) (Oral)   Resp 19   Ht 5' 3\" (1.6 m)   Wt 196 lb 4.8 oz (89 kg)   SpO2 94%   BMI 34.77 kg/m²     CONSTITUTIONAL: well , well nourished, appears age appropriate  HEAD: normocephalic, atraumatic  EYES: sclera anicteric, PERRL, EOMI  ENMT:moist mucous membranes, pharynx clear          Nares: w/o erythema or edema  NECK: supple.  Thyroid normal, No JVD or bruits  RESPIRATORY: Chest: clear to ascultation and percussion with decreased breath sounds and dry bibasilar rales are noted  CARDIOVASCULAR: Heart: regular rate and rhythm no murmurs, rubs or gallops, PMI not displaced, no thrill  GASTROINTESTINAL: Abdomen: non distended, soft, non-tender, bowel sounds normal  HEMATOLOGIC: no petechiae or purpura  LYMPHATIC: no lymphadenopathy  MUSCULOSKELETAL: Extremities: no edema or active synovitis, pulse 1+   BACK; no point or CVAT  INTEGUMENT: No unusual rashes or suspicious skin lesions noted. Nails appear normal.  NEUROLOGIC: non-focal exam   MENTAL STATUS: alert and oriented, appropriate affect   PSYCHIATRIC: normal affect    ASSESSMENT:   1. Acute on chronic respiratory failure with hypoxia (HCC)    2. Interstitial lung disease ([address]    3. Controlled type 2 diabetes mellitus with stage 2 chronic kidney disease, with long-term current use of insulin ([address]    4. Hyperglycemia due to diabetes mellitus ([address]    5. Hypertension with renal disease    6. CKD (chronic kidney disease), stage II    7. Class 1 obesity due to excess calories without serious comorbidity with body mass index (BMI) of 33.0 to 33.9 in adult      Impression  1. Acute on chronic respiratory failure O2 sat when she first got back to the exam room today was 88 which quickly came up to 94% on room air  2. Interstitial lung disease now on prednisone 40 mg daily which I am going to decrease to 30 mg today. 3   Diabetes mellitus that was markedly out-of-control upon arrival to the hospital with high-dose steroids. We have increased her insulin we will see what the numbers look like today. 4.  Hyperglycemia as noted  5. Hypertension that is controlled  6. CKD stage II we will see what that status is  7. Obesity weight today is 196 which is actually down 8 pounds from her last office visit November 12  Extremely high risk patient with high risk of decompensation repeat hospitalization. Close follow-up warranted which have scheduled follow-up in 2 weeks with the understanding I will see her sooner should she get worse. I am still not sure that she has avoided having go to rehab which she was trying to avoid. We did discuss the fact that this can be accomplished without going back to the hospital of done within 2 weeks of hospital discharge as far as my current understanding is. PLAN:  .  Orders Placed This Encounter    METABOLIC PANEL, BASIC         ATTENTION:   This medical record was transcribed using an electronic medical records system. Although proofread, it may and can contain electronic and spelling errors. Other human spelling and other errors may be present. Corrections may be executed at a later time. Please feel free to contact us for any clarifications as needed. Follow-up and Dispositions    · Return in about 3 weeks (around 1/11/2022). Celina Yo MD    Orders Placed This Encounter    METABOLIC PANEL, BASIC     Standing Status:   Future     Standing Expiration Date:   12/20/2022          No results found for any visits on 12/21/21. I have reviewed the patient's medical history in detail and updated the computerized patient record. We had a prolonged discussion about these complex clinical issues and went over the various important aspects to consider. All questions were answered. Advised her to call back or return to office if symptoms do not improve, change in nature, or persist.    She was given an after visit summary or informed of [facility] which includes patient instructions, diagnoses, current medications, & vitals. She expressed understanding with the diagnosis and plan.

## 2021-12-22 NOTE — PROGRESS NOTES
Blood sugar is up so I will change her Lantus dosing from 50 units once daily to 35 units twice daily and watch diet.

## 2021-12-30 NOTE — PROGRESS NOTES
Called and spoke to patient  Two pt identifiers confirmed  Informed patient per Dr. Javed blood sugar is up and to change Lantus from 50 units daily to 35 units twice daily and to watch diet  Patient verbalized understanding of information discussed  with no further questions at this time.

## 2022-01-03 ENCOUNTER — HOSPITAL ENCOUNTER (OUTPATIENT)
Dept: CT IMAGING | Age: 77
Discharge: HOME | End: 2022-01-03
Attending: INTERNAL MEDICINE
Payer: MEDICARE

## 2022-01-03 DIAGNOSIS — J84.9 ILD (INTERSTITIAL LUNG DISEASE) (HCC): ICD-10-CM

## 2022-01-03 PROCEDURE — 71250 CT THORAX DX C-: CPT

## 2022-01-08 PROBLEM — E11.65 HYPERGLYCEMIA DUE TO DIABETES MELLITUS (HCC): Status: RESOLVED | Noted: 2021-12-11 | Resolved: 2022-01-08

## 2022-01-08 PROBLEM — J96.20 ACUTE ON CHRONIC RESPIRATORY FAILURE (HCC): Status: RESOLVED | Noted: 2021-12-11 | Resolved: 2022-01-08

## 2022-01-08 PROBLEM — E66.01 SEVERE OBESITY (HCC): Status: RESOLVED | Noted: 2020-08-27 | Resolved: 2022-01-08

## 2022-01-08 NOTE — PROGRESS NOTES
Chief Complaint   Patient presents with    Hypertension     3 month    Diabetes    Chronic Kidney Disease    Cholesterol Problem    Depression       SUBJECTIVE:    Jailene Palencia is a 78 y.o. female who returns in follow-up for medical problems include hypertension, diabetes, hyperlipidemia, chronic interstitial lung disease, chronic cough, DJD, CKD stage II, obesity and other multiple medical problems. She does note she still has a lot of coughing. She does have an appointment see her pulmonologist Dr. Vita Solis in 2 weeks and she is to have pulmonary function tests at that time. Her prednisone dose is at 40 mg daily and overall breathing seems to be doing pretty well. She denies any chest pain or increase of her chronic dyspnea and no other cardiorespiratory complaints except for the cough. She does note she has some mild dysuria but no urinary frequency or back pain abdominal pain and no other  complaints. She notes no GI complaints. She has no headaches, dizziness or neurologic complaints. She has no change of her chronic arthritic complaints and and no other complaints noted. Current Outpatient Medications   Medication Sig Dispense Refill    predniSONE (DELTASONE) 10 mg tablet Take 30 mg by mouth daily. Take two tablets daily 90 Tablet prn    benzonatate (TESSALON) 200 mg capsule TAKE 1 CAPSULE BY MOUTH THREE TIMES DAILY AS NEEDED FOR COUGH - SWALLOW CAPSULE WHOLE (DO NOT CRUSH) 60 Capsule 1    insulin glargine (LANTUS,BASAGLAR) 100 unit/mL (3 mL) inpn Take 50 units daily 5 Pen 5    insulin glargine (Lantus Solostar U-100 Insulin) 100 unit/mL (3 mL) inpn INJECT 50 UNITS DAILY 10 Pen 3    albuterol (ProAir HFA) 90 mcg/actuation inhaler Take 1 Puff by inhalation every four (4) hours as needed for Wheezing or Shortness of Breath.  18 g 5    glimepiride (AMARYL) 4 mg tablet TAKE 1 TABLET TWICE A DAY (DOSE/DIRECTIONS CHANGED) 180 Tablet 3    rosuvastatin (CRESTOR) 20 mg tablet Take 1 Tablet by mouth nightly. 90 Tablet 3    buPROPion XL (WELLBUTRIN XL) 150 mg tablet TAKE 1 TABLET DAILY 90 Tablet 3    Januvia 100 mg tablet TAKE 1 TABLET DAILY 90 Tablet 3    sertraline (ZOLOFT) 100 mg tablet TAKE 1 TABLET DAILY 90 Tablet 3    carvediloL (COREG) 6.25 mg tablet TAKE 1 TABLET TWICE A  Tablet 3    BD Ultra-Fine Short Pen Needle 31 gauge x 5/16\" ndle USE UNDER THE SKIN DAILY. USE WITH LANTUS FLEX PEN DAILY 1 Package 3    fenofibrate nanocrystallized (TRICOR) 145 mg tablet Take 1 Tablet by mouth daily. 90 Tablet 3    hydroCHLOROthiazide (HYDRODIURIL) 25 mg tablet TAKE 1 TABLET DAILY FOR FLUID AND BLOOD PRESSURE 90 Tab 3    glucose blood VI test strips (True Metrix Glucose Test Strip) strip USE ONCE DAILY AND RECORD RESULTS IN A NOTEBOOK ALSO RECORD LAST MEALTIME IN NOTEBOOK 100 Strip 3    clemastine 2.68 mg tab tablet Take 2.68 mg by mouth two (2) times a day.  oxazepam (SERAX) 15 mg capsule TAKE 2 CAPSULES BY MOUTH NIGHTLY AT BEDTIME 180 Cap 1    clindamycin (CLEOCIN) 300 mg capsule Take 2 capsules 1 hour before dental procedure 10 Cap 0    pantoprazole (PROTONIX) 40 mg tablet Take 40 mg by mouth daily.  montelukast sodium (SINGULAIR PO) Take 10 mg by mouth nightly.  10 mg tab       Past Medical History:   Diagnosis Date    Abnormal LFTs 08/24/2017    FATTY LIVER    Arthritis     OSTEO    Avascular necrosis of hip ([facility] [address] 08/24/2017    BILAT HIPS    Breast CA ([address] 1989, 2001    BILATERAL; SURGERY, CHEMO    Chronic pain     CKD (chronic kidney disease), stage II 8/24/2017    Coagulation disorder ([address] 1984    ITP  (DR CHU BRADSHAW) - PLATELETS DROPPED TO 5K    Depression     Diabetes ([facility] [address]     TYPE 2; NIDDM    DJD (degenerative joint disease), multiple sites 8/24/2017    GI bleed 2008    HEMORRHOIDS    Hyperlipemia     Hypertension with renal disease 8/24/2017    IBS (irritable bowel syndrome) 8/24/2017    Ill-defined condition 2015    PNEUMONIA X2 - HOSPITALIZED IN 2015    Interstitial lung disease ([facility] [address] 04/01/2019    Liver disease     FATTY LIVER    Myalgia 8/24/2017    On statin therapy 8/24/2017    Overactive bladder 8/24/2017    Pneumonia 04/2015    HOSPITALIZED 3 WEEKS.  Polymyalgia rheumatica ([facility] [address] 8/24/2017    Prophylactic antibiotic 8/24/2017    Reflex abnormality     acid reflex    Rosacea      Past Surgical History:   Procedure Laterality Date    HX APPENDECTOMY  1950    HX BREAST BIOPSY Bilateral     HX CATARACT REMOVAL Bilateral     HX COLONOSCOPY      HX ENDOSCOPY      HX GI      COLONOSCOPY    HX HEENT      WISDOM TEETH    HX HYSTERECTOMY  2000S    HX MASTECTOMY Right 1989    HX MASTECTOMY Left 2001    HX ORTHOPAEDIC  2008    LUMBAR FUSION    HX ORTHOPAEDIC  2018    RE-DO LUMBAR FUSION, AND ADDED THORACIC FUSION    HX ORTHOPAEDIC  03/26/2019    neckectomy    HX TUBAL LIGATION  1981    HX UROLOGICAL  2000s    BLADDER SLING    HI BREAST SURGERY PROCEDURE UNLISTED  1993, 2002    RECONSTRUCTION X2    HI TOTAL HIP ARTHROPLASTY Left 11/16/2016    ANTERIOR APPROACH, DR Gwendolyn De La Torre; (POSTOP: STANDS ONE INCH TALLER ON LEFT FOOT)    HI TOTAL HIP ARTHROPLASTY Right 12/2016    ANTERIOR APPROACH (DR JAIME)     Allergies   Allergen Reactions    Metformin Diarrhea    Statins-Hmg-Coa Reductase Inhibitors Myalgia     Myalgia with  multiple statins  Takes pravachol     Codeine Rash     Rash on thighs    Darvon [Propoxyphene] Other (comments)     \"I see worms\"    Pcn [Penicillins] Rash    Sulfa (Sulfonamide Antibiotics) Rash       REVIEW OF SYSTEMS:  General: negative for - chills or fever, or weight loss or gain  ENT: negative for - headaches, nasal congestion or tinnitus  Eyes: no blurred or visual changes  Neck: No stiffness or swollen nodes  Respiratory: negative for - hemoptysis, shortness of breath or wheezing.   Positive for nonproductive cough  Cardiovascular : negative for - chest pain, edema, palpitations or shortness of breath  Gastrointestinal: negative for - abdominal pain, blood in stools, heartburn or nausea/vomiting  Genito-Urinary: Positive for dysuria without trouble voiding, or hematuria  Musculoskeletal: negative for - gait disturbance, joint pain, joint stiffness or joint swelling  Neurological: no TIA or stroke symptoms  Hematologic: no bruises, no bleeding  Lymphatic: no swollen glands  Integument: no lumps, mole changes, nail changes or rash  Endocrine:no malaise/lethargy poly uria or polydipsia or unexpected weight changes        Social History     Socioeconomic History    Marital status:    Tobacco Use    Smoking status: Never Smoker    Smokeless tobacco: Never Used   Vaping Use    Vaping Use: Never used   Substance and Sexual Activity    Alcohol use: No    Drug use: No    Sexual activity: Never     Family History   Problem Relation Age of Onset    Heart Disease Mother     Kidney Disease Mother     Lung Disease Mother         COPD    Diabetes Brother     Pacemaker Brother     Kidney Disease Brother     Hypertension Brother     Anesth Problems Neg Hx        OBJECTIVE:     Visit Vitals  /82 (BP 1 Location: Left upper arm, BP Patient Position: Sitting, BP Cuff Size: Large adult)   Pulse 99   Temp 98.6 °F (37 °C) (Oral)   Resp 19   Ht 5' 3\" (1.6 m)   Wt 196 lb 11.2 oz (89.2 kg)   SpO2 95%   BMI 34.84 kg/m²     CONSTITUTIONAL:   well nourished, appears age appropriate  EYES: sclera anicteric, PERRL, EOMI  ENMT:nares clear, moist mucous membranes, pharynx clear  NECK: supple.  Thyroid normal, No JVD or bruits  RESPIRATORY: Chest: clear to ascultation and percussion, normal inspiratory effort  CARDIOVASCULAR: Heart: regular rate and rhythm no murmurs, rubs or gallops, PMI not displaced, No thrills, no peripheral edema  GASTROINTESTINAL: Abdomen: non distended, soft, non tender, bowel sounds normal  HEMATOLOGIC: no purpura, petechiae or bruising  LYMPHATIC: No lymph node enlargemant  MUSCULOSKELETAL: Extremities: no active synovitis, pulse 1+. No diabetic foot changes   INTEGUMENT: No unusual rashes or suspicious skin lesions noted. Nails appear normal.  PERIPHERAL VASCULAR: normal pulses femoral, PT and DP  NEUROLOGIC: non-focal exam, A & O X 3. Normal distal sensation and proprioception all toes both feet. PSYCHIATRIC:, appropriate affect     ASSESSMENT:   1. Hypertension with renal disease    2. Controlled type 2 diabetes mellitus with stage 2 chronic kidney disease, with long-term current use of insulin ([address]    3. Mixed hyperlipidemia    4. Interstitial lung disease ([address]    5. Primary osteoarthritis involving multiple joints    6. CKD (chronic kidney disease), stage II    7. Class 1 obesity due to excess calories without serious comorbidity with body mass index (BMI) of 33.0 to 33.9 in adult    8. Recurrent depression ([address]    9. Polymyalgia rheumatica ([address]    10. Avascular necrosis of bone of hip, unspecified laterality ([address]    11. Abnormal LFTs    12. Dysuria      Impression  1. Hypertension that is adequately controlled so we will continue current therapy reviewed with her. 2.  Diabetes repeat status pending a prior lab review not make adjustments if necessary. 3.  Hyperlipidemia prior lab reviewed repeat status pending I will adjust if needed. 4. Interstitial lung disease chronic but stable followed by pulmonology Dr. Radha Barr I have renewed her Tessalon will decrease her prednisone to 30 mg daily. 5.  DJD that is stable  6. CKD stage II repeat status pending  7. Obesity I did discuss diet, exercise and weight reduction for overall health benefit. 8.  Depression that is stable  9. Polymyalgia rheumatica controlled  10. Avascular necrosis of hip seems to be stable  11. Prior elevated liver enzymes repeat status pending  12. Dysuria will check her urine and urine culture  Follow-up with me in a month regarding her lung disease in 3 months regarding other medical problems.   I will call with today's lab results. PLAN:  .  Orders Placed This Encounter    CULTURE, URINE    METABOLIC PANEL, COMPREHENSIVE    LIPID PANEL    CK    HEMOGLOBIN A1C WITH EAG    URINALYSIS W/ REFLEX CULTURE    AMB POC URINALYSIS DIP STICK AUTO W/O MICRO    predniSONE (DELTASONE) 10 mg tablet    benzonatate (TESSALON) 200 mg capsule         ATTENTION:   This medical record was transcribed using an electronic medical records system. Although proofread, it may and can contain electronic and spelling errors. Other human spelling and other errors may be present. Corrections may be executed at a later time. Please feel free to contact us for any clarifications as needed. Follow-up and Dispositions    · Return in about 4 weeks (around 2/7/2022). No results found for any visits on 01/10/22. Miller Dejesus MD    The patient verbalized understanding of the problems and plans as explained.

## 2022-01-10 ENCOUNTER — OFFICE VISIT (OUTPATIENT)
Dept: INTERNAL MEDICINE CLINIC | Age: 77
End: 2022-01-10
Payer: MEDICARE

## 2022-01-10 VITALS
HEART RATE: 99 BPM | HEIGHT: 63 IN | BODY MASS INDEX: 34.85 KG/M2 | SYSTOLIC BLOOD PRESSURE: 121 MMHG | WEIGHT: 196.7 LBS | RESPIRATION RATE: 19 BRPM | DIASTOLIC BLOOD PRESSURE: 82 MMHG | TEMPERATURE: 98.6 F | OXYGEN SATURATION: 95 %

## 2022-01-10 DIAGNOSIS — J84.9 INTERSTITIAL LUNG DISEASE (HCC): ICD-10-CM

## 2022-01-10 DIAGNOSIS — N18.2 CKD (CHRONIC KIDNEY DISEASE), STAGE II: ICD-10-CM

## 2022-01-10 DIAGNOSIS — I12.9 HYPERTENSION WITH RENAL DISEASE: Primary | ICD-10-CM

## 2022-01-10 DIAGNOSIS — Z79.4 CONTROLLED TYPE 2 DIABETES MELLITUS WITH STAGE 2 CHRONIC KIDNEY DISEASE, WITH LONG-TERM CURRENT USE OF INSULIN (HCC): ICD-10-CM

## 2022-01-10 DIAGNOSIS — M87.059 AVASCULAR NECROSIS OF BONE OF HIP, UNSPECIFIED LATERALITY (HCC): ICD-10-CM

## 2022-01-10 DIAGNOSIS — E11.22 CONTROLLED TYPE 2 DIABETES MELLITUS WITH STAGE 2 CHRONIC KIDNEY DISEASE, WITH LONG-TERM CURRENT USE OF INSULIN (HCC): ICD-10-CM

## 2022-01-10 DIAGNOSIS — E78.2 MIXED HYPERLIPIDEMIA: ICD-10-CM

## 2022-01-10 DIAGNOSIS — E66.09 CLASS 1 OBESITY DUE TO EXCESS CALORIES WITHOUT SERIOUS COMORBIDITY WITH BODY MASS INDEX (BMI) OF 33.0 TO 33.9 IN ADULT: ICD-10-CM

## 2022-01-10 DIAGNOSIS — M35.3 POLYMYALGIA RHEUMATICA (HCC): ICD-10-CM

## 2022-01-10 DIAGNOSIS — N18.2 CONTROLLED TYPE 2 DIABETES MELLITUS WITH STAGE 2 CHRONIC KIDNEY DISEASE, WITH LONG-TERM CURRENT USE OF INSULIN (HCC): ICD-10-CM

## 2022-01-10 DIAGNOSIS — M15.9 PRIMARY OSTEOARTHRITIS INVOLVING MULTIPLE JOINTS: ICD-10-CM

## 2022-01-10 DIAGNOSIS — R30.0 DYSURIA: ICD-10-CM

## 2022-01-10 DIAGNOSIS — R79.89 ABNORMAL LFTS: ICD-10-CM

## 2022-01-10 DIAGNOSIS — F33.9 RECURRENT DEPRESSION (HCC): ICD-10-CM

## 2022-01-10 LAB
ALBUMIN SERPL-MCNC: 4.2 G/DL (ref 3.5–5)
ALBUMIN/GLOB SERPL: 1.2 {RATIO} (ref 1.1–2.2)
ALP SERPL-CCNC: 125 U/L (ref 45–117)
ALT SERPL-CCNC: 82 U/L (ref 12–78)
ANION GAP SERPL CALC-SCNC: 10 MMOL/L (ref 5–15)
APPEARANCE UR: ABNORMAL
AST SERPL-CCNC: 44 U/L (ref 15–37)
BACTERIA URNS QL MICRO: ABNORMAL /HPF
BILIRUB SERPL-MCNC: 0.8 MG/DL (ref 0.2–1)
BILIRUB UR QL CFM: NEGATIVE
BILIRUB UR QL STRIP: ABNORMAL
BUN SERPL-MCNC: 34 MG/DL (ref 6–20)
BUN/CREAT SERPL: 29 (ref 12–20)
CALCIUM SERPL-MCNC: 10.5 MG/DL (ref 8.5–10.1)
CHLORIDE SERPL-SCNC: 102 MMOL/L (ref 97–108)
CHOLEST SERPL-MCNC: 229 MG/DL
CK SERPL-CCNC: 26 U/L (ref 26–192)
CO2 SERPL-SCNC: 29 MMOL/L (ref 21–32)
COLOR UR: ABNORMAL
CREAT SERPL-MCNC: 1.19 MG/DL (ref 0.55–1.02)
EPITH CASTS URNS QL MICRO: ABNORMAL /LPF
EST. AVERAGE GLUCOSE BLD GHB EST-MCNC: 258 MG/DL
GLOBULIN SER CALC-MCNC: 3.4 G/DL (ref 2–4)
GLUCOSE SERPL-MCNC: 115 MG/DL (ref 65–100)
GLUCOSE UR STRIP.AUTO-MCNC: NEGATIVE MG/DL
GLUCOSE UR-MCNC: NEGATIVE MG/DL
HBA1C MFR BLD: 10.6 % (ref 4–5.6)
HDLC SERPL-MCNC: 59 MG/DL
HDLC SERPL: 3.9 {RATIO} (ref 0–5)
HGB UR QL STRIP: ABNORMAL
KETONES P FAST UR STRIP-MCNC: ABNORMAL MG/DL
KETONES UR QL STRIP.AUTO: ABNORMAL MG/DL
LDLC SERPL CALC-MCNC: 113.2 MG/DL (ref 0–100)
LEUKOCYTE ESTERASE UR QL STRIP.AUTO: ABNORMAL
MUCOUS THREADS URNS QL MICRO: ABNORMAL /LPF
NITRITE UR QL STRIP.AUTO: POSITIVE
PH UR STRIP: 6 [PH] (ref 5–7)
PH UR STRIP: 6 [PH] (ref 5–8)
POTASSIUM SERPL-SCNC: 4 MMOL/L (ref 3.5–5.1)
PROT SERPL-MCNC: 7.6 G/DL (ref 6.4–8.2)
PROT UR QL STRIP: ABNORMAL
PROT UR STRIP-MCNC: 300 MG/DL
RBC #/AREA URNS HPF: ABNORMAL /HPF (ref 0–5)
SODIUM SERPL-SCNC: 141 MMOL/L (ref 136–145)
SP GR UR REFRACTOMETRY: 1.02 (ref 1–1.03)
SP GR UR STRIP: 1.03 (ref 1.01–1.02)
TRIGL SERPL-MCNC: 284 MG/DL (ref ?–150)
UA UROBILINOGEN AMB POC: ABNORMAL (ref 0.2–1)
UA: UC IF INDICATED,UAUC: ABNORMAL
URINALYSIS CLARITY POC: ABNORMAL
URINALYSIS COLOR POC: YELLOW
URINE BLOOD POC: ABNORMAL
URINE LEUKOCYTES POC: ABNORMAL
URINE NITRITES POC: POSITIVE
UROBILINOGEN UR QL STRIP.AUTO: 1 EU/DL (ref 0.2–1)
VLDLC SERPL CALC-MCNC: 56.8 MG/DL
WBC URNS QL MICRO: >100 /HPF (ref 0–4)

## 2022-01-10 PROCEDURE — 1111F DSCHRG MED/CURRENT MED MERGE: CPT | Performed by: INTERNAL MEDICINE

## 2022-01-10 PROCEDURE — 3017F COLORECTAL CA SCREEN DOC REV: CPT | Performed by: INTERNAL MEDICINE

## 2022-01-10 PROCEDURE — 2022F DILAT RTA XM EVC RTNOPTHY: CPT | Performed by: INTERNAL MEDICINE

## 2022-01-10 PROCEDURE — G9717 DOC PT DX DEP/BP F/U NT REQ: HCPCS | Performed by: INTERNAL MEDICINE

## 2022-01-10 PROCEDURE — 99214 OFFICE O/P EST MOD 30 MIN: CPT | Performed by: INTERNAL MEDICINE

## 2022-01-10 PROCEDURE — G8417 CALC BMI ABV UP PARAM F/U: HCPCS | Performed by: INTERNAL MEDICINE

## 2022-01-10 PROCEDURE — G8427 DOCREV CUR MEDS BY ELIG CLIN: HCPCS | Performed by: INTERNAL MEDICINE

## 2022-01-10 PROCEDURE — 1101F PT FALLS ASSESS-DOCD LE1/YR: CPT | Performed by: INTERNAL MEDICINE

## 2022-01-10 PROCEDURE — 3046F HEMOGLOBIN A1C LEVEL >9.0%: CPT | Performed by: INTERNAL MEDICINE

## 2022-01-10 PROCEDURE — 81003 URINALYSIS AUTO W/O SCOPE: CPT | Performed by: INTERNAL MEDICINE

## 2022-01-10 PROCEDURE — 1090F PRES/ABSN URINE INCON ASSESS: CPT | Performed by: INTERNAL MEDICINE

## 2022-01-10 PROCEDURE — G8399 PT W/DXA RESULTS DOCUMENT: HCPCS | Performed by: INTERNAL MEDICINE

## 2022-01-10 PROCEDURE — G8536 NO DOC ELDER MAL SCRN: HCPCS | Performed by: INTERNAL MEDICINE

## 2022-01-10 PROCEDURE — G9708 BILAT MAST/HX BI /UNILAT MAS: HCPCS | Performed by: INTERNAL MEDICINE

## 2022-01-10 RX ORDER — PREDNISONE 10 MG/1
30 TABLET ORAL DAILY
Qty: 90 TABLET | Status: SHIPPED | OUTPATIENT
Start: 2022-01-10 | End: 2022-03-11 | Stop reason: SDUPTHER

## 2022-01-10 RX ORDER — BENZONATATE 200 MG/1
CAPSULE ORAL
Qty: 60 CAPSULE | Refills: 1 | Status: SHIPPED | OUTPATIENT
Start: 2022-01-10 | End: 2022-04-18 | Stop reason: SDUPTHER

## 2022-01-10 NOTE — PROGRESS NOTES
HIPAA verified by two patient identifiers. Tenzin De León is a 78 y.o. female    Chief Complaint   Patient presents with    Hypertension     3 month    Diabetes    Chronic Kidney Disease    Cholesterol Problem    Depression       Visit Vitals  /82 (BP 1 Location: Left upper arm, BP Patient Position: Sitting, BP Cuff Size: Large adult)   Pulse 99   Temp 98.6 °F (37 °C) (Oral)   Resp 19   Ht 5' 3\" (1.6 m)   Wt 196 lb 11.2 oz (89.2 kg)   SpO2 95%   BMI 34.84 kg/m²       Pain Scale: 0 - No pain/10  Pain Location:       Health Maintenance Due   Topic Date Due    COVID-19 Vaccine (1) Never done    DTaP/Tdap/Td series (1 - Tdap) Never done    Shingrix Vaccine Age 50> (1 of 2) Never done    A1C test (Diabetic or Prediabetic)  01/06/2022         Coordination of Care Questionnaire:  :   1) Have you been to an emergency room, urgent care, or hospitalized since your last visit? If yes, where when, and reason for visit? Yes    Week of dec 21st    Respiratory failure       2. Have seen or consulted any other health care provider since your last visit? If yes, where when, and reason for visit? NO      Patient is accompanied by self I have received verbal consent from Tenzin De León to discuss any/all medical information while they are present in the room.

## 2022-01-10 NOTE — PATIENT INSTRUCTIONS
Anxiety Disorder: Care Instructions  Your Care Instructions     Anxiety is a normal reaction to stress. Difficult situations can cause you to have symptoms such as sweaty palms and a nervous feeling. In an anxiety disorder, the symptoms are far more severe. Constant worry, muscle tension, trouble sleeping, nausea and diarrhea, and other symptoms can make normal daily activities difficult or impossible. These symptoms may occur for no reason, and they can affect your work, school, or social life. Medicines, counseling, and self-care can all help. Follow-up care is a key part of your treatment and safety. Be sure to make and go to all appointments, and call your doctor if you are having problems. It's also a good idea to know your test results and keep a list of the medicines you take. How can you care for yourself at home? · Take medicines exactly as directed. Call your doctor if you think you are having a problem with your medicine. · Go to your counseling sessions and follow-up appointments. · Recognize and accept your anxiety. Then, when you are in a situation that makes you anxious, say to yourself, \"This is not an emergency. I feel uncomfortable, but I am not in danger. I can keep going even if I feel anxious. \"  · Be kind to your body:  ? Relieve tension with exercise or a massage. ? Get enough rest.  ? Avoid alcohol, caffeine, nicotine, and illegal drugs. They can increase your anxiety level and cause sleep problems. ? Learn and do relaxation techniques. See below for more about these techniques. · Engage your mind. Get out and do something you enjoy. Go to a funny movie, or take a walk or hike. Plan your day. Having too much or too little to do can make you anxious. · Keep a record of your symptoms. Discuss your fears with a good friend or family member, or join a support group for people with similar problems. Talking to others sometimes relieves stress.   · Get involved in social groups, or volunteer to help others. Being alone sometimes makes things seem worse than they are. · Get at least 30 minutes of exercise on most days of the week to relieve stress. Walking is a good choice. You also may want to do other activities, such as running, swimming, cycling, or playing tennis or team sports. Relaxation techniques  Do relaxation exercises 10 to 20 minutes a day. You can play soothing, relaxing music while you do them, if you wish. · Tell others in your house that you are going to do your relaxation exercises. Ask them not to disturb you. · Find a comfortable place, away from all distractions and noise. · Lie down on your back, or sit with your back straight. · Focus on your breathing. Make it slow and steady. · Breathe in through your nose. Breathe out through either your nose or mouth. · Breathe deeply, filling up the area between your navel and your rib cage. Breathe so that your belly goes up and down. · Do not hold your breath. · Breathe like this for 5 to 10 minutes. Notice the feeling of calmness throughout your whole body. As you continue to breathe slowly and deeply, relax by doing the following for another 5 to 10 minutes:  · Tighten and relax each muscle group in your body. You can begin at your toes and work your way up to your head. · Imagine your muscle groups relaxing and becoming heavy. · Empty your mind of all thoughts. · Let yourself relax more and more deeply. · Become aware of the state of calmness that surrounds you. · When your relaxation time is over, you can bring yourself back to alertness by moving your fingers and toes and then your hands and feet and then stretching and moving your entire body. Sometimes people fall asleep during relaxation, but they usually wake up shortly afterward. · Always give yourself time to return to full alertness before you drive a car or do anything that might cause an accident if you are not fully alert.  Never play a relaxation tape while you drive a car. When should you call for help? Call 911 anytime you think you may need emergency care. For example, call if:    · You feel you cannot stop from hurting yourself or someone else. Keep the numbers for these national suicide hotlines: 7-725-108-TALK (0-914.253.4192) and 2-911-CBLZNUC (7-737.201.9315). If you or someone you know talks about suicide or feeling hopeless, get help right away. Watch closely for changes in your health, and be sure to contact your doctor if:    · You have anxiety or fear that affects your life.     · You have symptoms of anxiety that are new or different from those you had before. Where can you learn more? Go to http://www.devine.com/  Enter P754 in the search box to learn more about \"Anxiety Disorder: Care Instructions. \"  Current as of: June 16, 2021               Content Version: 13.0  © 2006-2021 [employer]. Care instructions adapted under license by [employer] (which disclaims liability or warranty for this information). If you have questions about a medical condition or this instruction, always ask your healthcare professional. [address] any warranty or liability for your use of this information.

## 2022-01-11 ENCOUNTER — TELEPHONE (OUTPATIENT)
Dept: INTERNAL MEDICINE CLINIC | Age: 77
End: 2022-01-11

## 2022-01-11 NOTE — TELEPHONE ENCOUNTER
Called [facility] and spoke to Christie Peterson and informed patient's dictated note states Prednisone 30 mg daily.

## 2022-01-11 NOTE — TELEPHONE ENCOUNTER
jaquelin from [facility]. .    States the directions that were sent for the prednisone not correct.     Needs clarification      [phone]

## 2022-01-17 RX ORDER — LEVOFLOXACIN 250 MG/1
250 TABLET ORAL DAILY
Qty: 7 TABLET | Refills: 0 | Status: SHIPPED | OUTPATIENT
Start: 2022-01-17 | End: 2022-01-24

## 2022-01-17 NOTE — PROGRESS NOTES
Urinary tract infection so treat with Levaquin 250 daily for 7 days. Discussed with patient. Rx to be sent to patient's pharmacy. Advised to get a f/up ua and culture after treatment.

## 2022-01-17 NOTE — TELEPHONE ENCOUNTER
RX refill request from the patient/pharmacy. Patient last seen 01- with labs, and needs to get a f/up ua and culture after treatment. Requested Prescriptions     Pending Prescriptions Disp Refills    levoFLOXacin (LEVAQUIN) 250 mg tablet 7 Tablet 0     Sig: Take 1 Tablet by mouth daily for 7 days.

## 2022-01-18 ENCOUNTER — TELEPHONE (OUTPATIENT)
Dept: INTERNAL MEDICINE CLINIC | Age: 77
End: 2022-01-18

## 2022-01-18 NOTE — TELEPHONE ENCOUNTER
Returned call to Derek Quintero who is a  Physical Therapist.  States her blood sugar was 584 this morning. She is not aware if this was fasting or not. Patient was eating peaches she she got to the house so they had a little discussion regarding diet. Reviewed patient's medication list and they could not find Januvia 100 mg in the house. Asked for a refill. Suggested they get nursing in the house to help patient with diabetes teaching. Also discussed the use of steroids causing issues with her diabetes as well.

## 2022-01-18 NOTE — TELEPHONE ENCOUNTER
RX refill request from the patient/pharmacy. Patient last seen 01- with labs, and next appt. scheduled for 02-  Requested Prescriptions     Pending Prescriptions Disp Refills    SITagliptin (Januvia) 100 mg tablet 90 Tablet 3     Sig: TAKE 1 TABLET DAILY   .

## 2022-01-18 NOTE — TELEPHONE ENCOUNTER
Arty Lennox, Home Health PT. Would like more info on Stella's blood sugar levels and how to manage. States they have Januvia on her med list but does not have it with her anywhere in the home.     Her reading today is Mikey Munguia

## 2022-01-20 ENCOUNTER — TELEPHONE (OUTPATIENT)
Dept: INTERNAL MEDICINE CLINIC | Age: 77
End: 2022-01-20

## 2022-01-20 NOTE — TELEPHONE ENCOUNTER
Just JAM. Shivam Dalton Heads, Nurse With [facility]. Ced Mendenhall has been declining her care visits.

## 2022-02-08 PROBLEM — N30.90 CYSTITIS: Status: ACTIVE | Noted: 2022-02-08

## 2022-02-09 ENCOUNTER — OFFICE VISIT (OUTPATIENT)
Dept: INTERNAL MEDICINE CLINIC | Age: 77
End: 2022-02-09
Payer: MEDICARE

## 2022-02-09 VITALS
WEIGHT: 201.1 LBS | DIASTOLIC BLOOD PRESSURE: 78 MMHG | SYSTOLIC BLOOD PRESSURE: 130 MMHG | RESPIRATION RATE: 19 BRPM | BODY MASS INDEX: 35.63 KG/M2 | HEART RATE: 90 BPM | OXYGEN SATURATION: 94 % | HEIGHT: 63 IN | TEMPERATURE: 98.4 F

## 2022-02-09 DIAGNOSIS — J84.9 INTERSTITIAL LUNG DISEASE (HCC): Primary | ICD-10-CM

## 2022-02-09 DIAGNOSIS — N30.90 CYSTITIS: ICD-10-CM

## 2022-02-09 DIAGNOSIS — I12.9 HYPERTENSION WITH RENAL DISEASE: ICD-10-CM

## 2022-02-09 PROCEDURE — G8427 DOCREV CUR MEDS BY ELIG CLIN: HCPCS | Performed by: INTERNAL MEDICINE

## 2022-02-09 PROCEDURE — 99213 OFFICE O/P EST LOW 20 MIN: CPT | Performed by: INTERNAL MEDICINE

## 2022-02-09 PROCEDURE — 3017F COLORECTAL CA SCREEN DOC REV: CPT | Performed by: INTERNAL MEDICINE

## 2022-02-09 PROCEDURE — 1101F PT FALLS ASSESS-DOCD LE1/YR: CPT | Performed by: INTERNAL MEDICINE

## 2022-02-09 PROCEDURE — G8417 CALC BMI ABV UP PARAM F/U: HCPCS | Performed by: INTERNAL MEDICINE

## 2022-02-09 PROCEDURE — G8399 PT W/DXA RESULTS DOCUMENT: HCPCS | Performed by: INTERNAL MEDICINE

## 2022-02-09 PROCEDURE — G9717 DOC PT DX DEP/BP F/U NT REQ: HCPCS | Performed by: INTERNAL MEDICINE

## 2022-02-09 PROCEDURE — 1090F PRES/ABSN URINE INCON ASSESS: CPT | Performed by: INTERNAL MEDICINE

## 2022-02-09 PROCEDURE — G8536 NO DOC ELDER MAL SCRN: HCPCS | Performed by: INTERNAL MEDICINE

## 2022-02-09 RX ORDER — MUPIROCIN 20 MG/G
OINTMENT TOPICAL
COMMUNITY
Start: 2022-02-03 | End: 2022-05-03

## 2022-02-09 RX ORDER — CEPHALEXIN 500 MG/1
500 CAPSULE ORAL 2 TIMES DAILY
COMMUNITY
Start: 2022-02-07 | End: 2022-03-11 | Stop reason: ALTCHOICE

## 2022-02-09 NOTE — PATIENT INSTRUCTIONS
Allergies: Care Instructions  Overview     Allergies occur when your body's defense system (immune system) overreacts to certain substances. The immune system treats a harmless substance as if it were a harmful germ or virus. Many things can make this happen. These include pollens, medicine, food, dust, animal dander, and mold. Allergies can be mild or severe. Mild allergies can be managed with home treatment. But medicine may be needed to prevent problems. Managing your allergies is an important part of staying healthy. Your doctor may suggest that you have allergy testing to help find out what is causing your allergies. Severe allergies can cause reactions that affect your whole body (anaphylactic reactions). Your doctor may prescribe a shot of epinephrine to carry with you in case you have a severe reaction. Learn how to give yourself the shot and keep it with you at all times. Make sure it is not . Follow-up care is a key part of your treatment and safety. Be sure to make and go to all appointments, and call your doctor if you are having problems. It's also a good idea to know your test results and keep a list of the medicines you take. How can you care for yourself at home? · If you have been told by your doctor that dust or dust mites are causing your allergy, decrease the dust around your bed:  ? Wash sheets, pillowcases, and other bedding in hot water every week. ? Use dust-proof covers for pillows, duvets, and mattresses. Avoid plastic covers because they tear easily and do not \"breathe. \" Wash as instructed on the label. ? Do not use any blankets and pillows that you do not need. ? Use blankets that you can wash in your washing machine. ? Consider removing drapes and carpets, which attract and hold dust, from your bedroom. · If you are allergic to house dust and mites, do not use home humidifiers. Your doctor can suggest ways you can control dust and mites.   · Look for signs of cockroaches. Cockroaches cause allergic reactions. Use cockroach baits to get rid of them. Then, clean your home well. Cockroaches like areas where grocery bags, newspapers, empty bottles, or cardboard boxes are stored. Do not keep these inside your home, and keep trash and food containers sealed. Seal off any spots where cockroaches might enter your home. · If you are allergic to mold, get rid of furniture, rugs, and drapes that smell musty. Check for mold in the bathroom. · If you are allergic to outdoor pollen or mold spores, use air-conditioning. Change or clean all filters every month. Keep windows closed. · If you are allergic to pollen, stay inside when pollen counts are high. Use a vacuum  with a HEPA filter or a double-thickness filter at least two times each week. · Stay inside when air pollution is bad. Avoid paint fumes, perfumes, and other strong odors. · Avoid conditions that make your allergies worse. Stay away from smoke. Do not smoke or let anyone else smoke in your house. Do not use fireplaces or wood-burning stoves. · If you are allergic to your pets, change the air filter in your furnace every month. Use high-efficiency filters. · If you are allergic to pet dander, keep pets outside or out of your bedroom. Old carpet and cloth furniture can hold a lot of animal dander. You may need to replace them. When should you call for help? Give an epinephrine shot if:    · You think you are having a severe allergic reaction.     · You have symptoms in more than one body area, such as mild nausea and an itchy mouth. After giving an epinephrine shot call 911, even if you feel better. Call 911 if:    · You have symptoms of a severe allergic reaction. These may include:  ? Sudden raised, red areas (hives) all over your body. ? Swelling of the throat, mouth, lips, or tongue. ? Trouble breathing. ? Passing out (losing consciousness).  Or you may feel very lightheaded or suddenly feel weak, confused, or restless.     · You have been given an epinephrine shot, even if you feel better. Call your doctor now or seek immediate medical care if:    · You have symptoms of an allergic reaction, such as:  ? A rash or hives (raised, red areas on the skin). ? Itching. ? Swelling. ? Belly pain, nausea, or vomiting. Watch closely for changes in your health, and be sure to contact your doctor if:    · You do not get better as expected. Where can you learn more? Go to http://www.devine.com/  Enter W171 in the search box to learn more about \"Allergies: Care Instructions. \"  Current as of: February 10, 2021               Content Version: 13.0  © 2006-2021 [employer]. Care instructions adapted under license by [employer] (which disclaims liability or warranty for this information). If you have questions about a medical condition or this instruction, always ask your healthcare professional. [place] [address] any warranty or liability for your use of this information.

## 2022-02-09 NOTE — PROGRESS NOTES
Chief Complaint   Patient presents with    Follow-up     1 month follow up of lung disease       SUBJECTIVE:    Christiano Pacheco is a 78 y.o. female follow-up regarding recent urinary tract infection as well as her hypertension and stage of lung disease. She has been seen by her pulmonologist Dr. Mary Ellen Pierre and he is trying her on a new drug which has a $2000 co-pay to help with the lungs which she is trying to work out that. She also had a recent urinary tract infection has completed antibiotic for that has no current  symptoms. Her breathing seems to be relatively good today with overall good day which she does have good days. She denies any new cardiorespiratory complaints. She notes no current GI or  complaints. She has no other specific complaints today. Current Outpatient Medications   Medication Sig Dispense Refill    cephALEXin (KEFLEX) 500 mg capsule Take 500 mg by mouth two (2) times a day.  mupirocin (BACTROBAN) 2 % ointment APPLY ONCE A DAY TO OPEN SORE      SITagliptin (Januvia) 100 mg tablet TAKE 1 TABLET DAILY 90 Tablet 3    predniSONE (DELTASONE) 10 mg tablet Take 30 mg by mouth daily. Take two tablets daily (Patient taking differently: Take 40 mg by mouth daily. Take two tablets daily) 90 Tablet prn    benzonatate (TESSALON) 200 mg capsule TAKE 1 CAPSULE BY MOUTH THREE TIMES DAILY AS NEEDED FOR COUGH - SWALLOW CAPSULE WHOLE (DO NOT CRUSH) 60 Capsule 1    insulin glargine (Lantus Solostar U-100 Insulin) 100 unit/mL (3 mL) inpn INJECT 50 UNITS DAILY 10 Pen 3    albuterol (ProAir HFA) 90 mcg/actuation inhaler Take 1 Puff by inhalation every four (4) hours as needed for Wheezing or Shortness of Breath. 18 g 5    glimepiride (AMARYL) 4 mg tablet TAKE 1 TABLET TWICE A DAY (DOSE/DIRECTIONS CHANGED) 180 Tablet 3    rosuvastatin (CRESTOR) 20 mg tablet Take 1 Tablet by mouth nightly.  90 Tablet 3    buPROPion XL (WELLBUTRIN XL) 150 mg tablet TAKE 1 TABLET DAILY 90 Tablet 3    sertraline (ZOLOFT) 100 mg tablet TAKE 1 TABLET DAILY 90 Tablet 3    carvediloL (COREG) 6.25 mg tablet TAKE 1 TABLET TWICE A  Tablet 3    BD Ultra-Fine Short Pen Needle 31 gauge x 5/16\" ndle USE UNDER THE SKIN DAILY. USE WITH LANTUS FLEX PEN DAILY 1 Package 3    fenofibrate nanocrystallized (TRICOR) 145 mg tablet Take 1 Tablet by mouth daily. 90 Tablet 3    hydroCHLOROthiazide (HYDRODIURIL) 25 mg tablet TAKE 1 TABLET DAILY FOR FLUID AND BLOOD PRESSURE 90 Tab 3    glucose blood VI test strips (True Metrix Glucose Test Strip) strip USE ONCE DAILY AND RECORD RESULTS IN A NOTEBOOK ALSO RECORD LAST MEALTIME IN NOTEBOOK 100 Strip 3    clemastine 2.68 mg tab tablet Take 2.68 mg by mouth two (2) times a day.  oxazepam (SERAX) 15 mg capsule TAKE 2 CAPSULES BY MOUTH NIGHTLY AT BEDTIME 180 Cap 1    clindamycin (CLEOCIN) 300 mg capsule Take 2 capsules 1 hour before dental procedure 10 Cap 0    pantoprazole (PROTONIX) 40 mg tablet Take 40 mg by mouth daily.  montelukast sodium (SINGULAIR PO) Take 10 mg by mouth nightly.  10 mg tab       Past Medical History:   Diagnosis Date    Abnormal LFTs 08/24/2017    FATTY LIVER    Arthritis     OSTEO    Avascular necrosis of hip ([facility] [address] 08/24/2017    BILAT HIPS    Breast CA ([facility] [address] 1989, 2001    BILATERAL; SURGERY, CHEMO    Chronic pain     CKD (chronic kidney disease), stage II 8/24/2017    Coagulation disorder ([facility] [address] 1984    ITP  (DR CHU BRADSHAW) - PLATELETS DROPPED TO 5K    Depression     Diabetes ([facility] [address]     TYPE 2; NIDDM    DJD (degenerative joint disease), multiple sites 8/24/2017    GI bleed 2008    HEMORRHOIDS    Hyperlipemia     Hypertension with renal disease 8/24/2017    IBS (irritable bowel syndrome) 8/24/2017    Ill-defined condition 2015    PNEUMONIA X2 - HOSPITALIZED IN 2015    Interstitial lung disease ([address] 04/01/2019    Liver disease     FATTY LIVER    Myalgia 8/24/2017    On statin therapy 8/24/2017    Overactive bladder 8/24/2017    Pneumonia 04/2015    HOSPITALIZED 3 WEEKS.     Polymyalgia rheumatica ([facility] [address] 8/24/2017    Prophylactic antibiotic 8/24/2017    Reflex abnormality     acid reflex    Rosacea      Past Surgical History:   Procedure Laterality Date    HX APPENDECTOMY  1950    HX BREAST BIOPSY Bilateral     HX CATARACT REMOVAL Bilateral     HX COLONOSCOPY      HX ENDOSCOPY      HX GI      COLONOSCOPY    HX HEENT      WISDOM TEETH    HX HYSTERECTOMY  2000S    HX MASTECTOMY Right 1989    HX MASTECTOMY Left 2001    HX ORTHOPAEDIC  2008    LUMBAR FUSION    HX ORTHOPAEDIC  2018    RE-DO LUMBAR FUSION, AND ADDED THORACIC FUSION    HX ORTHOPAEDIC  03/26/2019    neckectomy    HX TUBAL LIGATION  1981    HX UROLOGICAL  2000s    BLADDER SLING    [place] BREAST SURG[place] PROCEDURE UNLISTED  1993, 2002    RECONSTRUCTION X2    [place] TOTAL HIP ARTHROPLASTY Left 11/16/2016    ANTERIOR APPROACH, DR Gwendolyn De La Torre; (POSTOP: STANDS ONE INCH TALLER ON LEFT FOOT)    [place] TOTAL HIP ARTHROPLASTY Right 12/2016    ANTERIOR APPROACH (DR JAIME)     Allergies   Allergen Reactions    Metformin Diarrhea    Statins-Hmg-Coa Reductase Inhibitors Myalgia     Myalgia with  multiple statins  Takes pravachol     Codeine Rash     Rash on thighs    Darvon [Propoxyphene] Other (comments)     \"I see worms\"    Pcn [Penicillins] Rash    Sulfa (Sulfonamide Antibiotics) Rash       REVIEW OF SYSTEMS:  General: negative for - chills or fever, or weight loss or gain  ENT: negative for - headaches, nasal congestion or tinnitus  Eyes: no blurred or visual changes  Neck: No stiffness or swollen nodes  Respiratory: negative for - cough, hemoptysis, shortness of breath or wheezing  Cardiovascular : negative for - chest pain, edema, palpitations or shortness of breath  Gastrointestinal: negative for - abdominal pain, blood in stools, heartburn or nausea/vomiting  Genito-Urinary: no dysuria, trouble voiding, or hematuria  Musculoskeletal: negative for - gait disturbance, joint pain, joint stiffness or joint swelling  Neurological: no TIA or stroke symptoms  Hematologic: no bruises, no bleeding  Lymphatic: no swollen glands  Integument: no lumps, mole changes, nail changes or rash  Endocrine:no malaise/lethargy poly uria or polydipsia or unexpected weight changes        Social History     Socioeconomic History    Marital status:    Tobacco Use    Smoking status: Never Smoker    Smokeless tobacco: Never Used   Vaping Use    Vaping Use: Never used   Substance and Sexual Activity    Alcohol use: No    Drug use: No    Sexual activity: Never     Family History   Problem Relation Age of Onset    Heart Disease Mother     Kidney Disease Mother     Lung Disease Mother         COPD    Diabetes Brother     Pacemaker Brother     Kidney Disease Brother     Hypertension Brother     Anesth Problems Neg Hx        OBJECTIVE:     Visit Vitals  /78 (BP 1 Location: Left upper arm, BP Patient Position: Sitting, BP Cuff Size: Adult)   Pulse 90   Temp 98.4 °F (36.9 °C) (Oral)   Resp 19   Ht 5' 3\" (1.6 m)   Wt 201 lb 1.6 oz (91.2 kg)   SpO2 94%   BMI 35.62 kg/m²     CONSTITUTIONAL:   well nourished, appears age appropriate  EYES: sclera anicteric, PERRL, EOMI  ENMT:nares clear, moist mucous membranes, pharynx clear  NECK: supple. Thyroid normal, No JVD or bruits  RESPIRATORY: Chest: clear to ascultation and percussion, normal inspiratory effort  CARDIOVASCULAR: Heart: regular rate and rhythm no murmurs, rubs or gallops, PMI not displaced, No thrills, no peripheral edema  GASTROINTESTINAL: Abdomen: non distended, soft, non tender, bowel sounds normal  HEMATOLOGIC: no purpura, petechiae or bruising  LYMPHATIC: No lymph node enlargemant  MUSCULOSKELETAL: Extremities: no active synovitis, pulse 1+   INTEGUMENT: No unusual rashes or suspicious skin lesions noted.  Nails appear normal.  PERIPHERAL VASCULAR: normal pulses femoral, PT and DP  NEUROLOGIC: non-focal exam, A & O X 3  PSYCHIATRIC:, appropriate affect     ASSESSMENT:   1. Interstitial lung disease ([address]    2. Cystitis    3. Hypertension with renal disease      Impression  1. Interstitial lung disease O2 sat today 94% on oxygen  2. Recent cystitis follow-up urine and culture pending  3. Hypertension blood is controlled  I will recheck her in about 2 months for her 3-month evaluation or sooner if there is a problem. PLAN:  .  Orders Placed This Encounter    CULTURE, URINE    URINALYSIS W/ REFLEX CULTURE    cephALEXin (KEFLEX) 500 mg capsule    mupirocin (BACTROBAN) 2 % ointment         ATTENTION:   This medical record was transcribed using an electronic medical records system. Although proofread, it may and can contain electronic and spelling errors. Other human spelling and other errors may be present. Corrections may be executed at a later time. Please feel free to contact us for any clarifications as needed. Follow-up and Dispositions    · Return in about 2 months (around 4/9/2022). No results found for any visits on 02/09/22. Celestine Rasmussen MD    The patient verbalized understanding of the problems and plans as explained.

## 2022-02-09 NOTE — PROGRESS NOTES
Chief Complaint   Patient presents with    Follow-up     1 month follow up of lung disease     Visit Vitals  /78 (BP 1 Location: Left upper arm, BP Patient Position: Sitting, BP Cuff Size: Adult)   Pulse 90   Temp 98.4 °F (36.9 °C) (Oral)   Resp 19   Ht 5' 3\" (1.6 m)   Wt 201 lb 1.6 oz (91.2 kg)   SpO2 94%   BMI 35.62 kg/m²     1. Have you been to the ER, urgent care clinic since your last visit? Hospitalized since your last visit? No    2. Have you seen or consulted any other health care providers outside of the [address] [place] [address] since your last visit? Include any pap smears or colon screening.  Dr. Mehnaz Childs-pulmonology

## 2022-02-10 LAB
APPEARANCE UR: CLEAR
BACTERIA URNS QL MICRO: NEGATIVE /HPF
BILIRUB UR QL: NEGATIVE
COLOR UR: ABNORMAL
EPITH CASTS URNS QL MICRO: ABNORMAL /LPF
GLUCOSE UR STRIP.AUTO-MCNC: >1000 MG/DL
HGB UR QL STRIP: NEGATIVE
HYALINE CASTS URNS QL MICRO: ABNORMAL /LPF (ref 0–5)
KETONES UR QL STRIP.AUTO: NEGATIVE MG/DL
LEUKOCYTE ESTERASE UR QL STRIP.AUTO: NEGATIVE
NITRITE UR QL STRIP.AUTO: NEGATIVE
PH UR STRIP: 6.5 [PH] (ref 5–8)
PROT UR STRIP-MCNC: NEGATIVE MG/DL
RBC #/AREA URNS HPF: ABNORMAL /HPF (ref 0–5)
SP GR UR REFRACTOMETRY: >1.03
UA: UC IF INDICATED,UAUC: ABNORMAL
UROBILINOGEN UR QL STRIP.AUTO: 0.2 EU/DL (ref 0.2–1)
WBC URNS QL MICRO: ABNORMAL /HPF (ref 0–4)

## 2022-02-11 LAB
BACTERIA SPEC CULT: NORMAL
SERVICE CMNT-IMP: NORMAL

## 2022-03-07 ENCOUNTER — HOSPITAL ENCOUNTER (EMERGENCY)
Age: 77
Discharge: HOME | End: 2022-03-07
Attending: EMERGENCY MEDICINE
Payer: MEDICARE

## 2022-03-07 ENCOUNTER — TELEPHONE (OUTPATIENT)
Dept: INTERNAL MEDICINE CLINIC | Age: 77
End: 2022-03-07

## 2022-03-07 VITALS
WEIGHT: 200 LBS | RESPIRATION RATE: 18 BRPM | OXYGEN SATURATION: 96 % | DIASTOLIC BLOOD PRESSURE: 86 MMHG | BODY MASS INDEX: 35.44 KG/M2 | SYSTOLIC BLOOD PRESSURE: 138 MMHG | HEART RATE: 74 BPM | TEMPERATURE: 98.1 F | HEIGHT: 63 IN

## 2022-03-07 DIAGNOSIS — N17.9 AKI (ACUTE KIDNEY INJURY) (HCC): ICD-10-CM

## 2022-03-07 DIAGNOSIS — I10 ACCELERATED HYPERTENSION: ICD-10-CM

## 2022-03-07 DIAGNOSIS — T88.7XXA MEDICATION SIDE EFFECT: ICD-10-CM

## 2022-03-07 DIAGNOSIS — F41.9 ANXIETY: Primary | ICD-10-CM

## 2022-03-07 DIAGNOSIS — E11.65 UNCONTROLLED TYPE 2 DIABETES MELLITUS WITH HYPERGLYCEMIA (HCC): Primary | ICD-10-CM

## 2022-03-07 LAB
ALBUMIN SERPL-MCNC: 3.6 G/DL (ref 3.5–5)
ALBUMIN/GLOB SERPL: 0.9 {RATIO} (ref 1.1–2.2)
ALP SERPL-CCNC: 192 U/L (ref 45–117)
ALT SERPL-CCNC: 145 U/L (ref 12–78)
ANION GAP SERPL CALC-SCNC: 7 MMOL/L (ref 5–15)
AST SERPL-CCNC: 94 U/L (ref 15–37)
BASOPHILS # BLD: 0.1 K/UL (ref 0–0.1)
BASOPHILS NFR BLD: 1 % (ref 0–1)
BILIRUB SERPL-MCNC: 1 MG/DL (ref 0.2–1)
BUN SERPL-MCNC: 33 MG/DL (ref 6–20)
BUN/CREAT SERPL: 27 (ref 12–20)
CALCIUM SERPL-MCNC: 9.5 MG/DL (ref 8.5–10.1)
CHLORIDE SERPL-SCNC: 93 MMOL/L (ref 97–108)
CO2 SERPL-SCNC: 28 MMOL/L (ref 21–32)
CREAT SERPL-MCNC: 1.23 MG/DL (ref 0.55–1.02)
DIFFERENTIAL METHOD BLD: ABNORMAL
EOSINOPHIL # BLD: 0 K/UL (ref 0–0.4)
EOSINOPHIL NFR BLD: 0 % (ref 0–7)
ERYTHROCYTE [DISTWIDTH] IN BLOOD BY AUTOMATED COUNT: 14.2 % (ref 11.5–14.5)
GLOBULIN SER CALC-MCNC: 3.8 G/DL (ref 2–4)
GLUCOSE BLD STRIP.AUTO-MCNC: 325 MG/DL (ref 65–117)
GLUCOSE BLD STRIP.AUTO-MCNC: 578 MG/DL (ref 65–117)
GLUCOSE SERPL-MCNC: 613 MG/DL (ref 65–100)
HCT VFR BLD AUTO: 46.5 % (ref 35–47)
HGB BLD-MCNC: 16.2 G/DL (ref 11.5–16)
IMM GRANULOCYTES # BLD AUTO: 0.1 K/UL (ref 0–0.04)
IMM GRANULOCYTES NFR BLD AUTO: 1 % (ref 0–0.5)
LYMPHOCYTES # BLD: 0.5 K/UL (ref 0.8–3.5)
LYMPHOCYTES NFR BLD: 5 % (ref 12–49)
MCH RBC QN AUTO: 33.5 PG (ref 26–34)
MCHC RBC AUTO-ENTMCNC: 34.8 G/DL (ref 30–36.5)
MCV RBC AUTO: 96.3 FL (ref 80–99)
MONOCYTES # BLD: 0.2 K/UL (ref 0–1)
MONOCYTES NFR BLD: 2 % (ref 5–13)
NEUTS SEG # BLD: 9.5 K/UL (ref 1.8–8)
NEUTS SEG NFR BLD: 91 % (ref 32–75)
NRBC # BLD: 0 K/UL (ref 0–0.01)
NRBC BLD-RTO: 0 PER 100 WBC
PLATELET # BLD AUTO: 226 K/UL (ref 150–400)
PMV BLD AUTO: 10.4 FL (ref 8.9–12.9)
POTASSIUM SERPL-SCNC: 4.6 MMOL/L (ref 3.5–5.1)
PROT SERPL-MCNC: 7.4 G/DL (ref 6.4–8.2)
RBC # BLD AUTO: 4.83 M/UL (ref 3.8–5.2)
RBC MORPH BLD: ABNORMAL
SERVICE CMNT-IMP: ABNORMAL
SERVICE CMNT-IMP: ABNORMAL
SODIUM SERPL-SCNC: 128 MMOL/L (ref 136–145)
WBC # BLD AUTO: 10.4 K/UL (ref 3.6–11)

## 2022-03-07 PROCEDURE — 85025 COMPLETE CBC W/AUTO DIFF WBC: CPT

## 2022-03-07 PROCEDURE — 80053 COMPREHEN METABOLIC PANEL: CPT

## 2022-03-07 PROCEDURE — 99284 EMERGENCY DEPT VISIT MOD MDM: CPT

## 2022-03-07 PROCEDURE — 74011636637 HC RX REV CODE- 636/637: Performed by: EMERGENCY MEDICINE

## 2022-03-07 PROCEDURE — 82962 GLUCOSE BLOOD TEST: CPT

## 2022-03-07 PROCEDURE — 74011250636 HC RX REV CODE- 250/636: Performed by: EMERGENCY MEDICINE

## 2022-03-07 PROCEDURE — 96361 HYDRATE IV INFUSION ADD-ON: CPT

## 2022-03-07 PROCEDURE — 96374 THER/PROPH/DIAG INJ IV PUSH: CPT

## 2022-03-07 RX ORDER — INSULIN GLARGINE 100 [IU]/ML
INJECTION, SOLUTION SUBCUTANEOUS
Qty: 10 PEN | Refills: 3 | Status: SHIPPED | OUTPATIENT
Start: 2022-03-07 | End: 2022-03-11 | Stop reason: SDUPTHER

## 2022-03-07 RX ORDER — CLONAZEPAM 1 MG/1
1 TABLET ORAL 2 TIMES DAILY
Qty: 60 TABLET | Refills: 0 | Status: SHIPPED | OUTPATIENT
Start: 2022-03-07 | End: 2022-06-03

## 2022-03-07 RX ADMIN — SODIUM CHLORIDE 1000 ML: 9 INJECTION, SOLUTION INTRAVENOUS at 19:35

## 2022-03-07 RX ADMIN — Medication 10 UNITS: at 19:35

## 2022-03-07 NOTE — TELEPHONE ENCOUNTER
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 .    States she visited [facility] for a wound care check and while there took vitals. Blood Sugar at 10am is 480  Blood Sugar around 2pm is [address] does not have any rapid acting insulin and she think about getting a refill on some.      [phone]

## 2022-03-07 NOTE — TELEPHONE ENCOUNTER
PCP: Isha De MD    Last appt: 2/9/2022  Future Appointments   Date Time Provider Gwendolyn Loving   4/15/2022 10:30 AM Isha De MD [facility] BS AMB   5/11/2022  8:30 AM MD SULLY [facility] BS AMB       Requested Prescriptions     Pending Prescriptions Disp Refills    clonazePAM (KlonoPIN) 1 mg tablet       Sig: Take 1 Tablet by mouth two (2) times a day. Max Daily Amount: 2 mg.     insulin glargine (Lantus Solostar U-100 Insulin) 100 unit/mL (3 mL) inpn 10 Pen 3     Sig: INJECT 50 UNITS DAILY       Prior labs and Blood pressures:  BP Readings from Last 3 Encounters:   02/09/22 130/78   01/10/22 121/82   12/21/21 124/88     Lab Results   Component Value Date/Time    Sodium 141 01/10/2022 09:54 AM    Potassium 4.0 01/10/2022 09:54 AM    Chloride 102 01/10/2022 09:54 AM    CO2 29 01/10/2022 09:54 AM    Anion gap 10 01/10/2022 09:54 AM    Glucose 115 (H) 01/10/2022 09:54 AM    BUN 34 (H) 01/10/2022 09:54 AM    Creatinine 1.19 (H) 01/10/2022 09:54 AM    BUN/Creatinine ratio 29 (H) 01/10/2022 09:54 AM    GFR est AA 54 (L) 01/10/2022 09:54 AM    GFR est non-AA 44 (L) 01/10/2022 09:54 AM    Calcium 10.5 (H) 01/10/2022 09:54 AM     Lab Results   Component Value Date/Time    Hemoglobin A1c 10.6 (H) 01/10/2022 09:54 AM    Hemoglobin A1c (POC) 7.1 (A) 04/09/2018 08:55 AM     Lab Results   Component Value Date/Time    Cholesterol, total 229 (H) 01/10/2022 09:54 AM    Cholesterol (POC) 218.0 (A) 04/09/2018 08:55 AM    HDL Cholesterol 59 01/10/2022 09:54 AM    HDL Cholesterol (POC) 56.0 04/09/2018 08:55 AM    LDL,Direct 129 (H) 10/06/2021 10:37 AM    LDL Cholesterol (POC) 119.4 04/09/2018 08:55 AM    LDL, calculated 113.2 (H) 01/10/2022 09:54 AM    VLDL, calculated 56.8 01/10/2022 09:54 AM    Triglyceride 284 (H) 01/10/2022 09:54 AM    Triglycerides (POC) 213.0 (A) 04/09/2018 08:55 AM    CHOL/HDL Ratio 3.9 01/10/2022 09:54 AM     Lab Results   Component Value Date/Time    VITAMIN D, 25-HYDROXY 55 04/02/2021 03:56 PM       Lab Results   Component Value Date/Time    TSH 3.130 01/14/2019 08:56 AM    TSH, 3rd generation 2.43 04/02/2021 03:56 PM

## 2022-03-07 NOTE — TELEPHONE ENCOUNTER
RX refill request from the patient/pharmacy. Patient last seen 02- with labs, and next appt. scheduled for 04-  Requested Prescriptions     Pending Prescriptions Disp Refills    clonazePAM (KlonoPIN) 1 mg tablet 60 Tablet 0     Sig: Take 1 Tablet by mouth two (2) times a day. Max Daily Amount: 2 mg.  insulin glargine (Lantus Solostar U-100 Insulin) 100 unit/mL (3 mL) inpn 10 Pen 3     Sig: INJECT 50 UNITS DAILY   .

## 2022-03-08 NOTE — ED PROVIDER NOTES
EMERGENCY DEPARTMENT HISTORY AND PHYSICAL EXAM      Please note that this dictation was completed with the assistance of \"Dragon\", the computer voice recognition software. Quite often unanticipated grammatical, syntax, homophones, and other interpretive errors are inadvertently transcribed by the computer software. Please disregard these errors and any errors that have escaped final proofreading. Thank you. Patient: Medina Arteaga  DOS: 22  : 1945  MRN: [id number]  History of Presenting Illness     Chief Complaint   Patient presents with    High Blood Sugar     high blood sugar today bgl 525 by rescue; she has a new medicine started yesterday that may have run her bgl up. it is to treat Pulmonary fibrosis; pt is also on prednsone 40mg every day     History Provided By: Patient/family/EMS (if applicable)    HPI: Medina Arteaga, 78 y.o. female with past medical history as documented below presents to the ED with c/o of elevated BS readings. Per EMS, pt was seen by [facility] today for elevated blood sugar. Pt had just started a new medication by her Pulmonologist, Dr. Jayjay Martínez, called [facility] for her pulmonary fibrosis. She does take prednisone 40mg daily as well. She was seen by [facility] today after her BS read 479 today. Pt has no short acting insulin at home. She does have increased polyuria and polydipsia for the past couple days. Per records, her UA was normal and blood sugar was > 602 per [facility] labs. Pt denies any other exacerbating or ameliorating factors. Additionally, pt specifically denies any recent fever, chills, headache, nausea, vomiting, abdominal pain, CP, SOB, lightheadedness, dizziness, numbness, weakness, lower extremity swelling, heart palpitations, urinary sxs, diarrhea, constipation, melena, hematochezia, cough, or congestion. There are no other complaints, changes or physical findings pertinent to the HPI at this time.     PCP: Dana Diop, MD  Past History   Past Medical History:  Past Medical History:   Diagnosis Date    Abnormal LFTs 08/24/2017    FATTY LIVER    Arthritis     OSTEO    Avascular necrosis of hip ([address] 08/24/2017    BILAT HIPS    Breast CA ([address] 1989, 2001    BILATERAL; SURGERY, CHEMO    Chronic pain     CKD (chronic kidney disease), stage II 8/24/2017    Coagulation disorder ([address] 1984    ITP  (DR CHU BRADSHAW) - PLATELETS DROPPED TO 5K    Depression     Diabetes ([address]     TYPE 2; NIDDM    DJD (degenerative joint disease), multiple sites 8/24/2017    GI bleed 2008    HEMORRHOIDS    Hyperlipemia     Hypertension with renal disease 8/24/2017    IBS (irritable bowel syndrome) 8/24/2017    Ill-defined condition 2015    PNEUMONIA X2 - HOSPITALIZED IN 2015    Interstitial lung disease ([address] 04/01/2019    Liver disease     FATTY LIVER    Myalgia 8/24/2017    On statin therapy 8/24/2017    Overactive bladder 8/24/2017    Pneumonia 04/2015    HOSPITALIZED 3 WEEKS.     Polymyalgia rheumatica ([address] 8/24/2017    Prophylactic antibiotic 8/24/2017    Reflex abnormality     acid reflex    Rosacea        Past Surgical History:  Past Surgical History:   Procedure Laterality Date    HX APPENDECTOMY  1950    HX BREAST BIOPSY Bilateral     HX CATARACT REMOVAL Bilateral     HX COLONOSCOPY      HX ENDOSCOPY      HX GI      COLONOSCOPY    HX HEENT      WISDOM TEETH    HX HYSTERECTOMY  2000S    HX MASTECTOMY Right 1989    HX MASTECTOMY Left 2001    HX ORTHOPAEDIC  2008    LUMBAR FUSION    HX ORTHOPAEDIC  2018    RE-DO LUMBAR FUSION, AND ADDED THORACIC FUSION    HX ORTHOPAEDIC  03/26/2019    neckectomy    HX TUBAL LIGATION  1981    HX UROLOGICAL  2000s    BLADDER SLING    MD BREAST SURGERY PROCEDURE UNLISTED  1993, 2002    RECONSTRUCTION X2    MD TOTAL HIP ARTHROPLASTY Left 11/16/2016    ANTERIOR APPROACH, DR Gwendolyn De La Torre; (POSTOP: STANDS ONE INCH TALLER ON LEFT FOOT)    MD TOTAL HIP ARTHROPLASTY Right 12/2016    ANTERIOR APPROACH (DR Priscilla Viramontes)       Family History:   Family history reviewed and was non-contributory, unless specified below:  Family History   Problem Relation Age of Onset    Heart Disease Mother     Kidney Disease Mother     Lung Disease Mother         COPD    Diabetes Brother     Pacemaker Brother     Kidney Disease Brother     Hypertension Brother     Anesth Problems Neg Hx        Social History:  Social History     Tobacco Use    Smoking status: Never Smoker    Smokeless tobacco: Never Used   Vaping Use    Vaping Use: Never used   Substance Use Topics    Alcohol use: No    Drug use: No       Allergies: Allergies   Allergen Reactions    Metformin Diarrhea    Statins-Hmg-Coa Reductase Inhibitors Myalgia     Myalgia with  multiple statins  Takes pravachol     Codeine Rash     Rash on thighs    Darvon [Propoxyphene] Other (comments)     \"I see worms\"    Pcn [Penicillins] Rash    Sulfa (Sulfonamide Antibiotics) Rash       Current Medications:  No current facility-administered medications on file prior to encounter. Current Outpatient Medications on File Prior to Encounter   Medication Sig Dispense Refill    clonazePAM (KlonoPIN) 1 mg tablet Take 1 Tablet by mouth two (2) times a day. Max Daily Amount: 2 mg. 60 Tablet 0    insulin glargine (Lantus Solostar U-100 Insulin) 100 unit/mL (3 mL) inpn INJECT 50 UNITS DAILY 10 Pen 3    cephALEXin (KEFLEX) 500 mg capsule Take 500 mg by mouth two (2) times a day.  mupirocin (BACTROBAN) 2 % ointment APPLY ONCE A DAY TO OPEN SORE      SITagliptin (Januvia) 100 mg tablet TAKE 1 TABLET DAILY 90 Tablet 3    predniSONE (DELTASONE) 10 mg tablet Take 30 mg by mouth daily. Take two tablets daily (Patient taking differently: Take 40 mg by mouth daily.  Take two tablets daily) 90 Tablet prn    benzonatate (TESSALON) 200 mg capsule TAKE 1 CAPSULE BY MOUTH THREE TIMES DAILY AS NEEDED FOR COUGH - SWALLOW CAPSULE WHOLE (DO NOT CRUSH) 60 Capsule 1    albuterol (ProAir HFA) 90 mcg/actuation inhaler Take 1 Puff by inhalation every four (4) hours as needed for Wheezing or Shortness of Breath. 18 g 5    glimepiride (AMARYL) 4 mg tablet TAKE 1 TABLET TWICE A DAY (DOSE/DIRECTIONS CHANGED) 180 Tablet 3    rosuvastatin (CRESTOR) 20 mg tablet Take 1 Tablet by mouth nightly. 90 Tablet 3    buPROPion XL (WELLBUTRIN XL) 150 mg tablet TAKE 1 TABLET DAILY 90 Tablet 3    sertraline (ZOLOFT) 100 mg tablet TAKE 1 TABLET DAILY 90 Tablet 3    carvediloL (COREG) 6.25 mg tablet TAKE 1 TABLET TWICE A  Tablet 3    BD Ultra-Fine Short Pen Needle 31 gauge x 5/16\" ndle USE UNDER THE SKIN DAILY. USE WITH LANTUS FLEX PEN DAILY 1 Package 3    fenofibrate nanocrystallized (TRICOR) 145 mg tablet Take 1 Tablet by mouth daily. 90 Tablet 3    hydroCHLOROthiazide (HYDRODIURIL) 25 mg tablet TAKE 1 TABLET DAILY FOR FLUID AND BLOOD PRESSURE 90 Tab 3    glucose blood VI test strips (True Metrix Glucose Test Strip) strip USE ONCE DAILY AND RECORD RESULTS IN A NOTEBOOK ALSO RECORD LAST MEALTIME IN NOTEBOOK 100 Strip 3    clemastine 2.68 mg tab tablet Take 2.68 mg by mouth two (2) times a day.  oxazepam (SERAX) 15 mg capsule TAKE 2 CAPSULES BY MOUTH NIGHTLY AT BEDTIME 180 Cap 1    clindamycin (CLEOCIN) 300 mg capsule Take 2 capsules 1 hour before dental procedure 10 Cap 0    pantoprazole (PROTONIX) 40 mg tablet Take 40 mg by mouth daily.  montelukast sodium (SINGULAIR PO) Take 10 mg by mouth nightly. 10 mg tab       Review of Systems   A complete ROS was reviewed by me today and all other systems negative, unless otherwise specified below:  Review of Systems   Constitutional: Negative. Negative for chills and fever. HENT: Negative. Negative for congestion and sore throat. Eyes: Negative. Respiratory: Negative. Negative for cough, chest tightness, shortness of breath and wheezing. Cardiovascular: Negative. Negative for chest pain, palpitations and leg swelling. Gastrointestinal: Negative. Negative for abdominal distention, abdominal pain, blood in stool, constipation, diarrhea, nausea and vomiting. Endocrine: Positive for polydipsia and polyuria. Negative for polyphagia. Genitourinary: Negative. Negative for dysuria, flank pain, frequency, hematuria and urgency. Musculoskeletal: Negative. Negative for arthralgias, back pain and myalgias. Skin: Negative. Negative for color change and rash. Neurological: Negative. Negative for dizziness, syncope, speech difficulty, weakness, light-headedness, numbness and headaches. Hematological: Negative. Psychiatric/Behavioral: Negative. Negative for confusion and self-injury. The patient is not nervous/anxious. All other systems reviewed and are negative. Physical Exam   Physical Exam  Vitals and nursing note reviewed. Constitutional:       General: She is not in acute distress. Appearance: She is well-developed. She is not diaphoretic. HENT:      Head: Normocephalic and atraumatic. Mouth/Throat:      Pharynx: No oropharyngeal exudate. Eyes:      Conjunctiva/sclera: Conjunctivae normal.   Cardiovascular:      Rate and Rhythm: Normal rate and regular rhythm. Heart sounds: Normal heart sounds. Pulmonary:      Effort: Pulmonary effort is normal. No respiratory distress. Breath sounds: Normal breath sounds. No wheezing or rales. Chest:      Chest wall: No tenderness. Abdominal:      General: Bowel sounds are normal. There is no distension. Palpations: Abdomen is soft. There is no mass. Tenderness: There is no abdominal tenderness. There is no guarding or rebound. Musculoskeletal:         General: Normal range of motion. Cervical back: Normal range of motion. Skin:     General: Skin is warm. Neurological:      Mental Status: She is alert and oriented to person, place, and time. Cranial Nerves: No cranial nerve deficit. Motor: No abnormal muscle tone. Diagnostic Study Results     Laboratory Data  I have personally reviewed and interpreted all available laboratory results. Recent Results (from the past 24 hour(s))   GLUCOSE, POC    Collection Time: 03/07/22  6:26 PM   Result Value Ref Range    Glucose (POC) 578 (H) 65 - 117 mg/dL    Performed by Malorie Bahena \"Janet\"    CBC WITH AUTOMATED DIFF    Collection Time: 03/07/22  6:29 PM   Result Value Ref Range    WBC 10.4 3.6 - 11.0 K/uL    RBC 4.83 3.80 - 5.20 M/uL    HGB 16.2 (H) 11.5 - 16.0 g/dL    HCT 46.5 35.0 - 47.0 %    MCV 96.3 80.0 - 99.0 FL    MCH 33.5 26.0 - 34.0 PG    MCHC 34.8 30.0 - 36.5 g/dL    RDW 14.2 11.5 - 14.5 %    PLATELET 948 177 - 952 K/uL    MPV 10.4 8.9 - 12.9 FL    NRBC 0.0 0  WBC    ABSOLUTE NRBC 0.00 0.00 - 0.01 K/uL    NEUTROPHILS 91 (H) 32 - 75 %    LYMPHOCYTES 5 (L) 12 - 49 %    MONOCYTES 2 (L) 5 - 13 %    EOSINOPHILS 0 0 - 7 %    BASOPHILS 1 0 - 1 %    IMMATURE GRANULOCYTES 1 (H) 0.0 - 0.5 %    ABS. NEUTROPHILS 9.5 (H) 1.8 - 8.0 K/UL    ABS. LYMPHOCYTES 0.5 (L) 0.8 - 3.5 K/UL    ABS. MONOCYTES 0.2 0.0 - 1.0 K/UL    ABS. EOSINOPHILS 0.0 0.0 - 0.4 K/UL    ABS. BASOPHILS 0.1 0.0 - 0.1 K/UL    ABS. IMM. GRANS. 0.1 (H) 0.00 - 0.04 K/UL    DF SMEAR SCANNED      RBC COMMENTS NORMOCYTIC, NORMOCHROMIC     METABOLIC PANEL, COMPREHENSIVE    Collection Time: 03/07/22  6:29 PM   Result Value Ref Range    Sodium 128 (L) 136 - 145 mmol/L    Potassium 4.6 3.5 - 5.1 mmol/L    Chloride 93 (L) 97 - 108 mmol/L    CO2 28 21 - 32 mmol/L    Anion gap 7 5 - 15 mmol/L    Glucose 613 (HH) 65 - 100 mg/dL    BUN 33 (H) 6 - 20 MG/DL    Creatinine 1.23 (H) 0.55 - 1.02 MG/DL    BUN/Creatinine ratio 27 (H) 12 - 20      GFR est AA 52 (L) >60 ml/min/1.73m2    GFR est non-AA 43 (L) >60 ml/min/1.73m2    Calcium 9.5 8.5 - 10.1 MG/DL    Bilirubin, total 1.0 0.2 - 1.0 MG/DL    ALT (SGPT) 145 (H) 12 - 78 U/L    AST (SGOT) 94 (H) 15 - 37 U/L    Alk.  phosphatase 192 (H) 45 - 117 U/L    Protein, total 7.4 6.4 - 8.2 g/dL    Albumin 3.6 3.5 - 5.0 g/dL    Globulin 3.8 2.0 - 4.0 g/dL    A-G Ratio 0.9 (L) 1.1 - 2.2     GLUCOSE, POC    Collection Time: 03/07/22  8:20 PM   Result Value Ref Range    Glucose (POC) 325 (H) 65 - 117 mg/dL    Performed by Steven CONRAD        Radiologic Studies   I have personally reviewed and interpreted all available imaging studies and agree with radiology interpretation. No orders to display     CT Results  (Last 48 hours)    None        CXR Results  (Last 48 hours)    None        Medical Decision Making   I am the first and primary ED physician for this patient's ED visit today. I reviewed our electronic medical record system for any past medical records that may contribute to the patient's current condition, including their past medical history, surgical history, social and family history. This also includes their most recent ED visits, previous hospitalizations and prior diagnostic data. I have reviewed and summarized the most pertinent findings in my HPI and MDM. Vital Signs Reviewed:  Patient Vitals for the past 24 hrs:   Temp Pulse Resp BP SpO2   03/07/22 2125 98.1 °F (36.7 °C) 74 18 138/86 96 %   03/07/22 1819 99.3 °F (37.4 °C) 98 22 (!) 143/103 95 %     Pulse Oximetry Analysis: 95% on RA    Cardiac Monitor:   Rate: 98 bpm  The cardiac monitor revealed the following rhythm as interpreted by me: Normal Sinus Rhythm  Cardiac monitoring was ordered to monitor patient for signs of cardiac dysrhythmia, which they are at risk for based on their history and/or risk for cardiovascular disease and/or metabolic abnormalities. Records Reviewed: Nursing Notes, Old Medical Records, Previous electrocardiograms, Previous Radiology Studies and Previous Laboratory Studies, EMS reports    Provider Notes (Medical Decision Making):   Patient presents with hyperglycemia. DDx: uncontrolled diabetes 2/2 noncompliance, infection, stressors.   Will obtain labs to r/o DKA or other metabolic anomalies, treat with insulin and fluids. I have recommended the following steps for improving diabetic care and outcome to her: referral to Diabetic Education department, diabetic diet discussed in detail, written exchange diet given, home glucose monitoring emphasized, home glucose monitoring demonstrated and taught, all medications, side effects and compliance discussed carefully and use and side effects of insulin is taught. A followup visit will be scheduled in the near future with patients PCP or endocrinologist to review and reinforce the importance of careful diabetic control to improve long term outcomes. ED Course:   Initial assessment performed. I discussed presenting problems and concerns, and my formulated plan for today's visit with the patient and any available family members. I have encouraged them to ask questions as they arise throughout the visit. Social History     Tobacco Use    Smoking status: Never Smoker    Smokeless tobacco: Never Used   Vaping Use    Vaping Use: Never used   Substance Use Topics    Alcohol use: No    Drug use: No       ED Orders Placed:  Orders Placed This Encounter    CBC WITH AUTOMATED DIFF    METABOLIC PANEL, COMPREHENSIVE    GLUCOSE, POC    GLUCOSE, POC    sodium chloride 0.9 % bolus infusion 1,000 mL    insulin regular (NOVOLIN R, HUMULIN R) injection 10 Units       ED Medications Administered During ED Course:  Medications   sodium chloride 0.9 % bolus infusion 1,000 mL (0 mL IntraVENous IV Completed 3/7/22 2022)   insulin regular (NOVOLIN R, HUMULIN R) injection 10 Units (10 Units IntraVENous Given 3/7/22 1935)         Progress Note:  Labs noted; initial , awaiting chemistry before giving insulin to ensure K is normal.     Progress Note:  K 4.6, Cr 1.23, AG normal, will give fluid bolus, IV insulin and reassess. Progress Note:  Repeat , pt safe for discharge, will refer to Endocrine, Dr. Sean Sanchez.  Recommend she stop taking the new PF medication, Ofev, until seen by Dr. Dominik Snyder again. Progress Note:  I have just re-evaluated the patient. Patient reports improvement of sx's. I have reviewed Her vital signs and determined there is currently no worsening in their condition or physical exam. Results have been reviewed with them and their questions have been answered. We will continue to review further results as they come available. Progress Note:  Pt reassessed and symptoms noted to have improved significantly after ED treatment. Pt is clinically stable for discharge. Marilynn Pickering labs and imaging have been reviewed with her and available family. She verbally conveys understanding and agreement of the signs, symptoms, diagnosis, treatment and prognosis and additionally agrees to follow up as recommended with Dr. Kade Manley MD and/or specialist as instructed. She agrees with the care plan we have created and conveys that all of her questions have been answered. Additionally, I have put together a packet of discharge instructions for her that include: 1) educational information regarding their diagnosis, 2) how to care for their diagnosis at home, as well a 3) list of reasons why they would want to return to the ED prior to their follow-up appointment should the patient's condition change or symptoms worsen. I have answered all questions to the patient's satisfaction. Strict return precautions given. She conveyed understanding and agreement with care plan. Vital signs stable for discharge. Disposition:  DISCHARGE  The pt is ready for discharge. The pt's signs, symptoms, diagnosis, and discharge instructions have been discussed and pt has conveyed their understanding. The pt is to follow up as recommended or return to ER should their symptoms worsen. Plan has been discussed and pt is in agreement. Plan:  1. Return precautions as discussed with patient and available family/caregiver.      2.   Discharge Medication List as of 3/7/2022  9:02 PM      No current facility-administered medications for this encounter. Current Outpatient Medications:     insulin NPH (HUMULIN N) 100 unit/mL (3 mL) inpn, 36 U daily with your prednisone dose, Disp: 3 Pen, Rfl: 5    predniSONE (DELTASONE) 10 mg tablet, Take 40 mg by mouth daily. Take two tablets daily, Disp: 120 Tablet, Rfl: 2    insulin glargine (Lantus Solostar U-100 Insulin) 100 unit/mL (3 mL) inpn, INJECT 35 UNITS BID, Disp: 10 Pen, Rfl: 3    clonazePAM (KlonoPIN) 1 mg tablet, Take 1 Tablet by mouth two (2) times a day. Max Daily Amount: 2 mg., Disp: 60 Tablet, Rfl: 0    mupirocin (BACTROBAN) 2 % ointment, APPLY ONCE A DAY TO OPEN SORE, Disp: , Rfl:     benzonatate (TESSALON) 200 mg capsule, TAKE 1 CAPSULE BY MOUTH THREE TIMES DAILY AS NEEDED FOR COUGH - SWALLOW CAPSULE WHOLE (DO NOT CRUSH), Disp: 60 Capsule, Rfl: 1    albuterol (ProAir HFA) 90 mcg/actuation inhaler, Take 1 Puff by inhalation every four (4) hours as needed for Wheezing or Shortness of Breath., Disp: 18 g, Rfl: 5    rosuvastatin (CRESTOR) 20 mg tablet, Take 1 Tablet by mouth nightly., Disp: 90 Tablet, Rfl: 3    buPROPion XL (WELLBUTRIN XL) 150 mg tablet, TAKE 1 TABLET DAILY, Disp: 90 Tablet, Rfl: 3    sertraline (ZOLOFT) 100 mg tablet, TAKE 1 TABLET DAILY, Disp: 90 Tablet, Rfl: 3    carvediloL (COREG) 6.25 mg tablet, TAKE 1 TABLET TWICE A DAY, Disp: 180 Tablet, Rfl: 3    BD Ultra-Fine Short Pen Needle 31 gauge x 5/16\" ndle, USE UNDER THE SKIN DAILY. USE WITH LANTUS FLEX PEN DAILY, Disp: 1 Package, Rfl: 3    fenofibrate nanocrystallized (TRICOR) 145 mg tablet, Take 1 Tablet by mouth daily. , Disp: 90 Tablet, Rfl: 3    hydroCHLOROthiazide (HYDRODIURIL) 25 mg tablet, TAKE 1 TABLET DAILY FOR FLUID AND BLOOD PRESSURE, Disp: 90 Tab, Rfl: 3    glucose blood VI test strips (True Metrix Glucose Test Strip) strip, USE ONCE DAILY AND RECORD RESULTS IN A NOTEBOOK ALSO RECORD LAST MEALTIME IN NOTEBOOK, Disp: 100 Strip, Rfl: 3    clemastine 2.68 mg tab tablet, Take 2.68 mg by mouth two (2) times a day., Disp: , Rfl:     oxazepam (SERAX) 15 mg capsule, TAKE 2 CAPSULES BY MOUTH NIGHTLY AT BEDTIME, Disp: 180 Cap, Rfl: 1    clindamycin (CLEOCIN) 300 mg capsule, Take 2 capsules 1 hour before dental procedure, Disp: 10 Cap, Rfl: 0    pantoprazole (PROTONIX) 40 mg tablet, Take 40 mg by mouth daily. , Disp: , Rfl:     montelukast sodium (SINGULAIR PO), Take 10 mg by mouth nightly. 10 mg tab, Disp: , Rfl:     3. Follow-up Information     Follow up With Specialties Details Why Contact Info    [facility] EMERGENCY DEPT Emergency Medicine  As needed, If symptoms worsen [address] [facility] [address]    Jorge Levy MD Internal Medicine  As needed, If symptoms worsen [address] [facility] [id number] [phone]      Madai Rogers MD Endocrinology  As needed, If symptoms worsen [address] [facility] [address]  [address] [facility] Sarthak, MD Pulmonary Disease   [address]  [address]  [facility]  [phone]          Instructed to return to ED if worse  Diagnosis   Clinical Impression:  1. Uncontrolled type 2 diabetes mellitus with hyperglycemia ([address]    2. Medication side effect    3. ROSARIO (acute kidney injury) ([facility]    4. Accelerated hypertension      Attestation:  Kassandra Fortune MD, am the attending of record for this patient. I personally performed the services described in this documentation on this date, 3/7/2022 for patient, Wing Rod. I have reviewed the chart and verified that the record is accurate and complete.

## 2022-03-08 NOTE — DISCHARGE INSTRUCTIONS
Thank You! It was a pleasure taking care of you in our Emergency Department today. We know that when you come to [facility] you are entrusting us with your health, comfort, and safety. Our physicians and nurses honor that trust, and truly appreciate the opportunity to care for you and your loved ones. We also value your feedback. If you receive a survey about your Emergency Department experience today, please fill it out. We care about our patients' feedback, and we listen to what you have to say. Thank you. Dr. Mary Shea M.D.      ________________________________________________________________________  I have included a copy of your lab results and/or radiologic studies from today's visit so you can have them easily available at your follow-up visit. We hope you feel better and please do not hesitate to contact the ED if you have any questions at all! Recent Results (from the past 12 hour(s))   GLUCOSE, POC    Collection Time: 03/07/22  6:26 PM   Result Value Ref Range    Glucose (POC) 578 (H) 65 - 117 mg/dL    Performed by Harman Resendez \"Janet\"    CBC WITH AUTOMATED DIFF    Collection Time: 03/07/22  6:29 PM   Result Value Ref Range    WBC 10.4 3.6 - 11.0 K/uL    RBC 4.83 3.80 - 5.20 M/uL    HGB 16.2 (H) 11.5 - 16.0 g/dL    HCT 46.5 35.0 - 47.0 %    MCV 96.3 80.0 - 99.0 FL    MCH 33.5 26.0 - 34.0 PG    MCHC 34.8 30.0 - 36.5 g/dL    RDW 14.2 11.5 - 14.5 %    PLATELET 922 170 - 535 K/uL    MPV 10.4 8.9 - 12.9 FL    NRBC 0.0 0  WBC    ABSOLUTE NRBC 0.00 0.00 - 0.01 K/uL    NEUTROPHILS 91 (H) 32 - 75 %    LYMPHOCYTES 5 (L) 12 - 49 %    MONOCYTES 2 (L) 5 - 13 %    EOSINOPHILS 0 0 - 7 %    BASOPHILS 1 0 - 1 %    IMMATURE GRANULOCYTES 1 (H) 0.0 - 0.5 %    ABS. NEUTROPHILS 9.5 (H) 1.8 - 8.0 K/UL    ABS. LYMPHOCYTES 0.5 (L) 0.8 - 3.5 K/UL    ABS. MONOCYTES 0.2 0.0 - 1.0 K/UL    ABS. EOSINOPHILS 0.0 0.0 - 0.4 K/UL    ABS. BASOPHILS 0.1 0.0 - 0.1 K/UL    ABS. IMM. GRANS. 0.1 (H) 0.00 - 0.04 K/UL    DF SMEAR SCANNED      RBC COMMENTS NORMOCYTIC, NORMOCHROMIC     METABOLIC PANEL, COMPREHENSIVE    Collection Time: 03/07/22  6:29 PM   Result Value Ref Range    Sodium 128 (L) 136 - 145 mmol/L    Potassium 4.6 3.5 - 5.1 mmol/L    Chloride 93 (L) 97 - 108 mmol/L    CO2 28 21 - 32 mmol/L    Anion gap 7 5 - 15 mmol/L    Glucose 613 (HH) 65 - 100 mg/dL    BUN 33 (H) 6 - 20 MG/DL    Creatinine 1.23 (H) 0.55 - 1.02 MG/DL    BUN/Creatinine ratio 27 (H) 12 - 20      GFR est AA 52 (L) >60 ml/min/1.73m2    GFR est non-AA 43 (L) >60 ml/min/1.73m2    Calcium 9.5 8.5 - 10.1 MG/DL    Bilirubin, total 1.0 0.2 - 1.0 MG/DL    ALT (SGPT) 145 (H) 12 - 78 U/L    AST (SGOT) 94 (H) 15 - 37 U/L    Alk. phosphatase 192 (H) 45 - 117 U/L    Protein, total 7.4 6.4 - 8.2 g/dL    Albumin 3.6 3.5 - 5.0 g/dL    Globulin 3.8 2.0 - 4.0 g/dL    A-G Ratio 0.9 (L) 1.1 - 2.2     GLUCOSE, POC    Collection Time: 03/07/22  8:20 PM   Result Value Ref Range    Glucose (POC) 325 (H) 65 - 117 mg/dL    Performed by [facility]        No orders to display     CT Results  (Last 48 hours)      None          The exam and treatment you received in the Emergency Department were for an urgent problem and are not intended as complete care. It is important that you follow up with a doctor, nurse practitioner, or physician assistant for ongoing care. If your symptoms become worse or you do not improve as expected and you are unable to reach your usual health care provider, you should return to the Emergency Department. We are available 24 hours a day. Please take your discharge instructions with you when you go to your follow-up appointment. If a prescription has been provided, please have it filled as soon as possible to prevent a delay in treatment. Read the entire medication instruction sheet provided to you by the pharmacy.  If you have any questions or reservations about taking the medication due to side effects or interactions with other medications, please call your primary care physician or contact the ER to speak with the charge nurse. Please make an appointment with your family doctor or the physician you were referred to for follow-up of this visit as instructed on your discharge paperwork. Return to the ER if you are unable to be seen or if you are unable to be seen in a timely manner. If you have any problem arranging the follow-up visit, contact the Emergency Department immediately.

## 2022-03-10 ENCOUNTER — TELEPHONE (OUTPATIENT)
Dept: ENDOCRINOLOGY | Age: 77
End: 2022-03-10

## 2022-03-10 ENCOUNTER — TELEPHONE (OUTPATIENT)
Dept: INTERNAL MEDICINE CLINIC | Age: 77
End: 2022-03-10

## 2022-03-10 NOTE — TELEPHONE ENCOUNTER
Dariana Vance,    I reviewed her chart and see that she is on prednisone for her pulmonary fibrosis but was unclear exactly what dose she is on (20, 30, or 40 mg). She appears to be on lantus 50 units daily and januvia 100 mg daily and amaryl 4 mg bid and has an allergy to metformin causing diarrhea. I find that the best insulin to control hyperglycemia while on prednisone is NPH as this matches the peak that steroids cause. Usually patients need 0.1 units/kg for every 10 mg of prednisone to be dosed at the time the dose the prednisone is taken so if she weighs 90 kg, and is on 20 mg she would need 18 units of NPH to cover 20 mg of prednisone, 27 units to cover 30 mg and 36 units to cover 40 mg. This would be ON TOP of the lantus. And if she is taking 20 mg twice daily it would be 18 units twice daily. If you start this, you can likely stop the amaryl and the [place] and [place] as these will not likely add much on top of the lantus and NPH. Let me know if you need any help with her and I can try to see if I can see her sooner than my next available.     Take care,  [facility]

## 2022-03-10 NOTE — TELEPHONE ENCOUNTER
----- Message from Akin Hand sent at 3/10/2022  4:56 PM EST -----  Regarding: np dm needing new appt  3/10/2022  4:58 PM    Good Evening ,    Ms. Adela Galloway was recently in the hospital at [facility]. She was referred to you by . She was seen for diabetes her sugar level was 612. Pt will like a sooner appt then what we have to offer. Pt stated that she has an appt tomorrow with her PCP that is also affiliated with [facility]. Please advise when the pt can be scheduled and I will call the pt at [phone].       Thanks,  Akin Hand

## 2022-03-11 ENCOUNTER — OFFICE VISIT (OUTPATIENT)
Dept: INTERNAL MEDICINE CLINIC | Age: 77
End: 2022-03-11
Payer: MEDICARE

## 2022-03-11 VITALS
RESPIRATION RATE: 18 BRPM | BODY MASS INDEX: 35.6 KG/M2 | WEIGHT: 200.9 LBS | TEMPERATURE: 98.3 F | SYSTOLIC BLOOD PRESSURE: 138 MMHG | OXYGEN SATURATION: 97 % | HEART RATE: 92 BPM | HEIGHT: 63 IN | DIASTOLIC BLOOD PRESSURE: 86 MMHG

## 2022-03-11 DIAGNOSIS — E11.22 CONTROLLED TYPE 2 DIABETES MELLITUS WITH STAGE 2 CHRONIC KIDNEY DISEASE, WITH LONG-TERM CURRENT USE OF INSULIN (HCC): Primary | ICD-10-CM

## 2022-03-11 DIAGNOSIS — E66.01 SEVERE OBESITY (BMI 35.0-39.9) WITH COMORBIDITY (HCC): ICD-10-CM

## 2022-03-11 DIAGNOSIS — J84.9 INTERSTITIAL LUNG DISEASE (HCC): ICD-10-CM

## 2022-03-11 DIAGNOSIS — N18.2 CONTROLLED TYPE 2 DIABETES MELLITUS WITH STAGE 2 CHRONIC KIDNEY DISEASE, WITH LONG-TERM CURRENT USE OF INSULIN (HCC): Primary | ICD-10-CM

## 2022-03-11 DIAGNOSIS — Z79.4 CONTROLLED TYPE 2 DIABETES MELLITUS WITH STAGE 2 CHRONIC KIDNEY DISEASE, WITH LONG-TERM CURRENT USE OF INSULIN (HCC): Primary | ICD-10-CM

## 2022-03-11 LAB
ANION GAP SERPL CALC-SCNC: 7 MMOL/L (ref 5–15)
BUN SERPL-MCNC: 36 MG/DL (ref 6–20)
BUN/CREAT SERPL: 40 (ref 12–20)
CALCIUM SERPL-MCNC: 9.6 MG/DL (ref 8.5–10.1)
CHLORIDE SERPL-SCNC: 102 MMOL/L (ref 97–108)
CO2 SERPL-SCNC: 27 MMOL/L (ref 21–32)
CREAT SERPL-MCNC: 0.9 MG/DL (ref 0.55–1.02)
GLUCOSE SERPL-MCNC: 167 MG/DL (ref 65–100)
POTASSIUM SERPL-SCNC: 3.2 MMOL/L (ref 3.5–5.1)
SODIUM SERPL-SCNC: 136 MMOL/L (ref 136–145)

## 2022-03-11 PROCEDURE — 2022F DILAT RTA XM EVC RTNOPTHY: CPT | Performed by: INTERNAL MEDICINE

## 2022-03-11 PROCEDURE — 3017F COLORECTAL CA SCREEN DOC REV: CPT | Performed by: INTERNAL MEDICINE

## 2022-03-11 PROCEDURE — 1090F PRES/ABSN URINE INCON ASSESS: CPT | Performed by: INTERNAL MEDICINE

## 2022-03-11 PROCEDURE — G8417 CALC BMI ABV UP PARAM F/U: HCPCS | Performed by: INTERNAL MEDICINE

## 2022-03-11 PROCEDURE — G9717 DOC PT DX DEP/BP F/U NT REQ: HCPCS | Performed by: INTERNAL MEDICINE

## 2022-03-11 PROCEDURE — G8536 NO DOC ELDER MAL SCRN: HCPCS | Performed by: INTERNAL MEDICINE

## 2022-03-11 PROCEDURE — G8427 DOCREV CUR MEDS BY ELIG CLIN: HCPCS | Performed by: INTERNAL MEDICINE

## 2022-03-11 PROCEDURE — G8754 DIAS BP LESS 90: HCPCS | Performed by: INTERNAL MEDICINE

## 2022-03-11 PROCEDURE — 1101F PT FALLS ASSESS-DOCD LE1/YR: CPT | Performed by: INTERNAL MEDICINE

## 2022-03-11 PROCEDURE — 99214 OFFICE O/P EST MOD 30 MIN: CPT | Performed by: INTERNAL MEDICINE

## 2022-03-11 PROCEDURE — G8752 SYS BP LESS 140: HCPCS | Performed by: INTERNAL MEDICINE

## 2022-03-11 PROCEDURE — G8399 PT W/DXA RESULTS DOCUMENT: HCPCS | Performed by: INTERNAL MEDICINE

## 2022-03-11 PROCEDURE — 3046F HEMOGLOBIN A1C LEVEL >9.0%: CPT | Performed by: INTERNAL MEDICINE

## 2022-03-11 RX ORDER — PREDNISONE 10 MG/1
40 TABLET ORAL DAILY
Qty: 120 TABLET | Refills: 2 | OUTPATIENT
Start: 2022-03-11 | End: 2022-03-15 | Stop reason: SDUPTHER

## 2022-03-11 RX ORDER — INSULIN GLARGINE 100 [IU]/ML
INJECTION, SOLUTION SUBCUTANEOUS
Qty: 10 PEN | Refills: 3 | OUTPATIENT
Start: 2022-03-11 | End: 2022-05-03 | Stop reason: SDUPTHER

## 2022-03-11 NOTE — PATIENT INSTRUCTIONS
Body Mass Index: Care Instructions  Your Care Instructions     Body mass index (BMI) can help you see if your weight is raising your risk for health problems. It uses a formula to compare how much you weigh with how tall you are. · A BMI lower than 18.5 is considered underweight. · A BMI between 18.5 and 24.9 is considered healthy. · A BMI between 25 and 29.9 is considered overweight. A BMI of 30 or higher is considered obese. If your BMI is in the normal range, it means that you have a lower risk for weight-related health problems. If your BMI is in the overweight or obese range, you may be at increased risk for weight-related health problems, such as high blood pressure, heart disease, stroke, arthritis or joint pain, and diabetes. If your BMI is in the underweight range, you may be at increased risk for health problems such as fatigue, lower protection (immunity) against illness, muscle loss, bone loss, hair loss, and hormone problems. BMI is just one measure of your risk for weight-related health problems. You may be at higher risk for health problems if you are not active, you eat an unhealthy diet, or you drink too much alcohol or use tobacco products. Follow-up care is a key part of your treatment and safety. Be sure to make and go to all appointments, and call your doctor if you are having problems. It's also a good idea to know your test results and keep a list of the medicines you take. How can you care for yourself at home? · Practice healthy eating habits. This includes eating plenty of fruits, vegetables, whole grains, lean protein, and low-fat dairy. · If your doctor recommends it, get more exercise. Walking is a good choice. Bit by bit, increase the amount you walk every day. Try for at least 30 minutes on most days of the week. · Do not smoke. Smoking can increase your risk for health problems. If you need help quitting, talk to your doctor about stop-smoking programs and medicines. These can increase your chances of quitting for good. · Limit alcohol to 2 drinks a day for men and 1 drink a day for women. Too much alcohol can cause health problems. If you have a BMI higher than 25  · Your doctor may do other tests to check your risk for weight-related health problems. This may include measuring the distance around your waist. A waist measurement of more than 40 inches in men or 35 inches in women can increase the risk of weight-related health problems. · Talk with your doctor about steps you can take to stay healthy or improve your health. You may need to make lifestyle changes to lose weight and stay healthy, such as changing your diet and getting regular exercise. If you have a BMI lower than 18.5  · Your doctor may do other tests to check your risk for health problems. · Talk with your doctor about steps you can take to stay healthy or improve your health. You may need to make lifestyle changes to gain or maintain weight and stay healthy, such as getting more healthy foods in your diet and doing exercises to build muscle. Where can you learn more? Go to http://www.devine.com/  Enter S176 in the search box to learn more about \"Body Mass Index: Care Instructions. \"  Current as of: December 27, 2021               Content Version: 13.2  © 2006-2022 [employer]. Care instructions adapted under license by [employer] (which disclaims liability or warranty for this information). If you have questions about a medical condition or this instruction, always ask your healthcare professional. [place] [address] any warranty or liability for your use of this information.

## 2022-03-11 NOTE — TELEPHONE ENCOUNTER
Carla Wilson,    Please see if she can come on Tuesday 5/3/22 at 8:50am for a 60 min slot. This is the soonest available I have at this time for diabetes. Thanks.

## 2022-03-11 NOTE — PROGRESS NOTES
HIPAA verified by two patient identifiers. Thien Goddard is a 78 y.o. female    Chief Complaint   Patient presents with    Diabetes       Visit Vitals  /86 (BP 1 Location: Left upper arm, BP Patient Position: Sitting, BP Cuff Size: Adult)   Pulse 92   Temp 98.3 °F (36.8 °C) (Oral)   Resp 18   Ht 5' 3\" (1.6 m)   Wt 200 lb 14.4 oz (91.1 kg)   SpO2 97%   BMI 35.59 kg/m²       Pain Scale: 0 - No pain/10  Pain Location:       Health Maintenance Due   Topic Date Due    COVID-19 Vaccine (1) Never done    DTaP/Tdap/Td series (1 - Tdap) Never done    Shingrix Vaccine Age 50> (1 of 2) Never done    Foot Exam Q1  04/02/2022    MICROALBUMIN Q1  04/02/2022    Medicare Yearly Exam  04/03/2022         Coordination of Care Questionnaire:  :   1) Have you been to an emergency room, urgent care, or hospitalized since your last visit? If yes, where when, and reason for visit? Yes  [facility]   3/7/2022   bs   612       2. Have seen or consulted any other health care provider since your last visit? If yes, where when, and reason for visit? NO      Patient is accompanied by self I have received verbal consent from Thien Goddard to discuss any/all medical information while they are present in the room.

## 2022-03-11 NOTE — PROGRESS NOTES
Subjective:   Medina Arteaga is a 78 y.o. female      Chief Complaint   Patient presents with    Diabetes        History of present illness: She presents today for evaluation of markedly elevated blood sugars in the face of being on high-dose prednisone at 40 mg daily for her chronic interstitial lung disease which is followed by Dr. Jayjay Martínez. She was seen in the emergency room on 3/7 for this with blood sugar being 612. She does have an appointment to see Dr. Brian Linares but at this point is not until October although hopefully he will be able to see her sooner. She currently notes some polyuria and polydipsia but denies any lightheadedness, dizziness or visual symptoms. She has been trying to watch her diet more closely since that time and blood sugars are still running from the 200s to the low 400s. She denies any current cardiorespiratory complaints. There are no other complaints noted.     Patient Active Problem List   Diagnosis Code    Controlled type 2 diabetes mellitus with stage 2 chronic kidney disease, with long-term current use of insulin ([facility] E11.22, N18.2, Z79.4    Non-seasonal allergic rhinitis J30.89    Class 1 obesity due to excess calories without serious comorbidity with body mass index (BMI) of 33.0 to 33.9 in adult E66.09, Z68.33    Rosacea L71.9    Mixed hyperlipidemia E78.2    Anxiety F41.9    GI bleed K92.2    Overactive bladder N32.81    Polymyalgia rheumatica (HCC) M35.3    IBS (irritable bowel syndrome) K58.9    Hypertension with renal disease I12.9    CKD (chronic kidney disease), stage II N18.2    Avascular necrosis of hip ([facility]) M87.059    Abnormal LFTs R94.5    Vitamin D deficiency E55.9    Recurrent depression ([facility]) F33.9    Primary osteoarthritis involving multiple joints M89.49    Sleep disturbance G47.9    Lumbar disc disease M51.9    Spinal stenosis, lumbar M48.061    Dyspnea on exertion R06.00    Spinal stenosis, cervical region M48.02    Cervical stenosis of spine M48.02    Interstitial lung disease (HCC) J84.9    Acute bronchitis J20.9    Non-recurrent acute suppurative otitis media of left ear without spontaneous rupture of tympanic membrane H66.002    Impacted cerumen of right ear H61.21    Other fatigue R53.83    Gait instability R26.81    Glossitis K14.0    Cough R05.9    Alcohol screening Z13.39    Primary osteoarthritis of right shoulder M19.011    Acute non-recurrent maxillary sinusitis J01.00    Tremor R25.1    Chronic midline low back pain without sciatica M54.50, G89.29    Dysuria R30.0    Cystitis N30.90      Past Medical History:   Diagnosis Date    Abnormal LFTs 08/24/2017    FATTY LIVER    Arthritis     OSTEO    Avascular necrosis of hip ([facility] 08/24/2017    BILAT HIPS    Breast CA ([facility] 1989, 2001    BILATERAL; SURGERY, CHEMO    Chronic pain     CKD (chronic kidney disease), stage II 8/24/2017    Coagulation disorder ([facility] 1984    ITP  (DR CHU BRADSHAW) - PLATELETS DROPPED TO 5K    Depression     Diabetes ([facility]     TYPE 2; NIDDM    DJD (degenerative joint disease), multiple sites 8/24/2017    GI bleed 2008    HEMORRHOIDS    Hyperlipemia     Hypertension with renal disease 8/24/2017    IBS (irritable bowel syndrome) 8/24/2017    Ill-defined condition 2015    PNEUMONIA X2 - HOSPITALIZED IN 2015    Interstitial lung disease ([facility] 04/01/2019    Liver disease     FATTY LIVER    Myalgia 8/24/2017    On statin therapy 8/24/2017    Overactive bladder 8/24/2017    Pneumonia 04/2015    HOSPITALIZED 3 WEEKS.     Polymyalgia rheumatica (HCC) 8/24/2017    Prophylactic antibiotic 8/24/2017    Reflex abnormality     acid reflex    Rosacea       Allergies   Allergen Reactions    Metformin Diarrhea    Statins-Hmg-Coa Reductase Inhibitors Myalgia     Myalgia with  multiple statins  Takes pravachol     Codeine Rash     Rash on thighs    Darvon [Propoxyphene] Other (comments)     \"I see worms\"    Pcn [Penicillins] Rash    Sulfa (Sulfonamide Antibiotics) Rash      Family History   Problem Relation Age of Onset    Heart Disease Mother     Kidney Disease Mother     Lung Disease Mother         COPD    Diabetes Brother     Pacemaker Brother     Kidney Disease Brother     Hypertension Brother     Anesth Problems Neg Hx       Social History     Socioeconomic History    Marital status:      Spouse name: Not on file    Number of children: Not on file    Years of education: Not on file    Highest education level: Not on file   Occupational History    Not on file   Tobacco Use    Smoking status: Never Smoker    Smokeless tobacco: Never Used   Vaping Use    Vaping Use: Never used   Substance and Sexual Activity    Alcohol use: No    Drug use: No    Sexual activity: Never   Other Topics Concern    Not on file   Social History Narrative    Not on file     Social Determinants of Health     Financial Resource Strain:     Difficulty of Paying Living Expenses: Not on file   Food Insecurity:     Worried About Running Out of Food in the Last Year: Not on file    Denisse of Food in the Last Year: Not on file   Transportation Needs:     Lack of Transportation (Medical): Not on file    Lack of Transportation (Non-Medical):  Not on file   Physical Activity:     Days of Exercise per Week: Not on file    Minutes of Exercise per Session: Not on file   Stress:     Feeling of Stress : Not on file   Social Connections:     Frequency of Communication with Friends and Family: Not on file    Frequency of Social Gatherings with Friends and Family: Not on file    Attends Quaker Services: Not on file    Active Member of Clubs or Organizations: Not on file    Attends Club or Organization Meetings: Not on file    Marital Status: Not on file   Intimate Partner Violence:     Fear of Current or Ex-Partner: Not on file    Emotionally Abused: Not on file    Physically Abused: Not on file    Sexually Abused: Not on file Housing Stability:     Unable to Pay for Housing in the Last Year: Not on file    Number of Places Lived in the Last Year: Not on file    Unstable Housing in the Last Year: Not on file     Prior to Admission medications    Medication Sig Start Date End Date Taking? Authorizing Provider   insulin NPH (HUMULIN N) 100 unit/mL (3 mL) inpn 36 U daily with your prednisone dose 3/11/22  Yes Mira Yeager MD   clonazePAM (KlonoPIN) 1 mg tablet Take 1 Tablet by mouth two (2) times a day. Max Daily Amount: 2 mg. 3/7/22  Yes Mira Yeager MD   insulin glargine (Lantus Solostar U-100 Insulin) 100 unit/mL (3 mL) inpn INJECT 50 UNITS DAILY 3/7/22  Yes Mira Yeager MD   mupirocin (BACTROBAN) 2 % ointment APPLY ONCE A DAY TO OPEN SORE 2/3/22  Yes Provider, Historical   predniSONE (DELTASONE) 10 mg tablet Take 30 mg by mouth daily. Take two tablets daily  Patient taking differently: Take 40 mg by mouth daily. Take two tablets daily 1/10/22  Yes Mira Yeager MD   benzonatate (TESSALON) 200 mg capsule TAKE 1 CAPSULE BY MOUTH THREE TIMES DAILY AS NEEDED FOR COUGH - SWALLOW CAPSULE WHOLE (DO NOT CRUSH) 1/10/22  Yes Mira Yeager MD   albuterol (ProAir HFA) 90 mcg/actuation inhaler Take 1 Puff by inhalation every four (4) hours as needed for Wheezing or Shortness of Breath. 12/16/21  Yes Mira Yeager MD   rosuvastatin (CRESTOR) 20 mg tablet Take 1 Tablet by mouth nightly. 10/21/21  Yes Mira Yeager MD   buPROPion XL (WELLBUTRIN XL) 150 mg tablet TAKE 1 TABLET DAILY 9/14/21  Yes Mira Yeager MD   sertraline (ZOLOFT) 100 mg tablet TAKE 1 TABLET DAILY 8/2/21  Yes Mira Yeager MD   carvediloL (COREG) 6.25 mg tablet TAKE 1 TABLET TWICE A DAY 7/19/21  Yes Mira Yeager MD   BD Ultra-Fine Short Pen Needle 31 gauge x 5/16\" ndle USE UNDER THE SKIN DAILY.  USE WITH LANTUS FLEX PEN DAILY 7/13/21  Yes Mira Yeager MD   hydroCHLOROthiazide (HYDRODIURIL) 25 mg tablet TAKE 1 TABLET DAILY FOR FLUID AND BLOOD PRESSURE 4/5/21  Yes Jose Blum MD   glucose blood VI test strips (True Metrix Glucose Test Strip) strip USE ONCE DAILY AND RECORD RESULTS IN A NOTEBOOK ALSO RECORD LAST MEALTIME IN NOTEBOOK 3/23/21  Yes Jose Blum MD   clemastine 2.68 mg tab tablet Take 2.68 mg by mouth two (2) times a day. Yes Provider, Historical   oxazepam (SERAX) 15 mg capsule TAKE 2 CAPSULES BY MOUTH NIGHTLY AT BEDTIME 8/13/20  Yes Jose Blum MD   clindamycin (CLEOCIN) 300 mg capsule Take 2 capsules 1 hour before dental procedure 8/13/20  Yes Jose Blum MD   pantoprazole (PROTONIX) 40 mg tablet Take 40 mg by mouth daily. 11/21/19  Yes Provider, Historical   montelukast sodium (SINGULAIR PO) Take 10 mg by mouth nightly. 10 mg tab   Yes Provider, Historical   cephALEXin (KEFLEX) 500 mg capsule Take 500 mg by mouth two (2) times a day. Patient not taking: Reported on 3/11/2022 2/7/22   Provider, Historical   fenofibrate nanocrystallized (TRICOR) 145 mg tablet Take 1 Tablet by mouth daily. 6/14/21   Jose Blum MD        Review of Systems              Constitutional:  She denies fever, weight loss, sweats or fatigue. EYES: No blurred or double vision,               ENT: no nasal congestion, no headache or dizziness. No difficulty with               swallowing, taste, speech or smell. Respiratory:  No cough, wheezing or shortness of breath. No sputum production. Cardiac:  Denies chest pain, palpitations, unexplained indigestion, syncope, edema, PND or orthopnea. GI:  No changes in bowel movements, no abdominal pain, no bloating, anorexia, nausea, vomiting or heartburn. :  No frequency or dysuria. Denies incontinence or sexual dysfunction. Extremities:  No joint pain, stiffness or swelling  Back:.no pain or soreness  Skin:  No recent rashes or mole changes. Neurological:  No numbness, tingling, burning paresthesias or loss of motor strength.   No syncope, dizziness, frequent headaches or memory loss. Hematologic:  No easy bruising  Lymphatic: No lymph node enlargement    Objective:     Vitals:    03/11/22 0815   BP: 138/86   Pulse: 92   Resp: 18   Temp: 98.3 °F (36.8 °C)   TempSrc: Oral   SpO2: 97%   Weight: 200 lb 14.4 oz (91.1 kg)   Height: 5' 3\" (1.6 m)   PainSc:   0 - No pain       Body mass index is 35.59 kg/m². Physical Examination:              General Appearance:  Well-developed, well-nourished, no acute distress. HEENT:      Ears:  The TMs and ear canals were clear. Eyes:  The pupillary responses were normal.  Extraocular muscle function intact. Lids and conjunctiva not injected. Funduscopic exam revealed sharp disc margins. Nares: Clear w/o edema or erythema  Pharynx:  Clear with teeth in good repair. No masses were noted. Neck:  Supple without thyromegaly or adenopathy. No JVD noted. No carotid                bruits. Lungs:  Clear to auscultation and percussion. Cardiac:  Regular rate and rhythm without murmur. PMI not displaced. No gallop, rub or click. Abdominal: Soft, non-tender, no hepata-spleenomegally or masses  Extremities:  No clubbing, cyanosis or edema. Skin:  No rash or unusual mole changes noted. Lymph Nodes:  None felt in the cervical, supraclavicular, axillary or inguinal region. Neurological: . DTRs 2+ and symmetric. Station and gait normal.   Hematologic:   No purpura or petechiae        Assessment/Plan:         1. Controlled type 2 diabetes mellitus with stage 2 chronic kidney disease, with long-term current use of insulin ([address]    2. Severe obesity (BMI 35.0-39. 9) with comorbidity ([address]    3. Interstitial lung disease ([address]        Impressions/Plan:  Impression  1. Diabetes mellitus uncontrolled at this point.   I appreciate Dr. Keri Martin reviewing her chart and making recommendations for her diabetes control and per his recommendations I will continue her current Lantus dose which is at 28 units twice daily. And since she is 91 kg and on 40 mg of prednisone once daily we will dose her with NPH 36 units at the time of her prednisone dose. I will discontinue her glimepiride and Januvia. 2.  Obesity I did discuss diet, exercise and weight reduction for overall health benefit as noted she is 91 kg which is without significant change from her weight on February 9  3. Interstitial lung disease followed by Dr. Nela Galloway who she is to see next week I did tell her that if he decreases her prednisone then she would have probably decrease her dose of NPH and I gave her the recommendations per Dr. Merrill Farley of how to dose it i.e. if he cuts her back from 40 mg to 30 mg then she would decrease to 27 units of NPH. ER records reviewed from her visit there on March 7 as well as the note I received from Dr. Merrill Farley which was very helpful. Follow-up with me per previous schedule or sooner if there are problems. Orders Placed This Encounter    METABOLIC PANEL, BASIC    insulin NPH (HUMULIN N) 100 unit/mL (3 mL) inpn       Follow-up and Dispositions    · Return in about 4 weeks (around 4/8/2022). No results found for any visits on 03/11/22. Betty Cornell MD    The patient was given after the visit summary the patient verbalized an understanding of the plans and problems as explained. She presents today for evaluation of uncontrolled hypertension in the face of being on high-dose prednisone 40 mg daily for chronic interstitial lung disease.

## 2022-03-15 ENCOUNTER — TELEPHONE (OUTPATIENT)
Dept: INTERNAL MEDICINE CLINIC | Age: 77
End: 2022-03-15

## 2022-03-15 DIAGNOSIS — F41.9 ANXIETY: ICD-10-CM

## 2022-03-15 RX ORDER — OXAZEPAM 15 MG/1
CAPSULE ORAL
Qty: 180 CAPSULE | Refills: 3 | Status: SHIPPED | OUTPATIENT
Start: 2022-03-15 | End: 2022-05-03

## 2022-03-15 RX ORDER — POTASSIUM CHLORIDE 20 MEQ/1
20 TABLET, EXTENDED RELEASE ORAL DAILY
Qty: 90 TABLET | Refills: 3 | Status: SHIPPED | OUTPATIENT
Start: 2022-03-15 | End: 2022-04-18 | Stop reason: SDUPTHER

## 2022-03-15 RX ORDER — PREDNISONE 10 MG/1
TABLET ORAL
Qty: 360 TABLET | Refills: 3 | Status: SHIPPED | OUTPATIENT
Start: 2022-03-15 | End: 2022-05-03

## 2022-03-15 NOTE — PROGRESS NOTES
K is low so start Potassium 20 meq every day. Discussed with patient. Rx sent to patient's pharmacy as requested.

## 2022-03-15 NOTE — TELEPHONE ENCOUNTER
RX refill request from the patient/pharmacy. Patient last seen 03- with labs, and next appt. scheduled for 04-  Requested Prescriptions     Pending Prescriptions Disp Refills    potassium chloride (K-DUR, KLOR-CON M20) 20 mEq tablet 90 Tablet 3     Sig: Take 1 Tablet by mouth daily.  predniSONE (DELTASONE) 10 mg tablet 360 Tablet 3     Sig: Take 4 tablets daily    oxazepam (SERAX) 15 mg capsule 180 Capsule 3     Sig: TAKE 2 CAPSULES BY MOUTH NIGHTLY AT BEDTIME   .

## 2022-03-15 NOTE — TELEPHONE ENCOUNTER
----- Message from Edward Banda sent at 3/14/2022  4:53 PM EDT -----  Subject: Message to Provider    QUESTIONS  Information for Provider? pt was returning a call from [facility]. If she is   available, pt have her return a call for the it when available  ---------------------------------------------------------------------------  --------------  [address]  What is the best way for the office to contact you? OK to leave message on   voicemail  Preferred Call Back Phone Number?  [phone]  ---------------------------------------------------------------------------  --------------  SCRIPT ANSWERS  undefined

## 2022-03-18 PROBLEM — H66.002 NON-RECURRENT ACUTE SUPPURATIVE OTITIS MEDIA OF LEFT EAR WITHOUT SPONTANEOUS RUPTURE OF TYMPANIC MEMBRANE: Status: ACTIVE | Noted: 2019-09-10

## 2022-03-18 PROBLEM — N30.90 CYSTITIS: Status: ACTIVE | Noted: 2022-02-08

## 2022-03-18 PROBLEM — M35.3 POLYMYALGIA RHEUMATICA (HCC): Status: ACTIVE | Noted: 2017-08-24

## 2022-03-18 PROBLEM — Z13.39 ALCOHOL SCREENING: Status: ACTIVE | Noted: 2020-02-12

## 2022-03-18 PROBLEM — K14.0 GLOSSITIS: Status: ACTIVE | Noted: 2019-11-26

## 2022-03-19 PROBLEM — M15.0 PRIMARY OSTEOARTHRITIS INVOLVING MULTIPLE JOINTS: Status: ACTIVE | Noted: 2018-01-12

## 2022-03-19 PROBLEM — R79.89 ABNORMAL LFTS: Status: ACTIVE | Noted: 2017-08-24

## 2022-03-19 PROBLEM — J01.00 ACUTE NON-RECURRENT MAXILLARY SINUSITIS: Status: ACTIVE | Noted: 2020-03-18

## 2022-03-19 PROBLEM — E55.9 VITAMIN D DEFICIENCY: Status: ACTIVE | Noted: 2017-10-02

## 2022-03-19 PROBLEM — M15.9 PRIMARY OSTEOARTHRITIS INVOLVING MULTIPLE JOINTS: Status: ACTIVE | Noted: 2018-01-12

## 2022-03-19 PROBLEM — N18.2 CKD (CHRONIC KIDNEY DISEASE), STAGE II: Status: ACTIVE | Noted: 2017-08-24

## 2022-03-19 PROBLEM — M54.50 CHRONIC MIDLINE LOW BACK PAIN WITHOUT SCIATICA: Status: ACTIVE | Noted: 2021-07-06

## 2022-03-19 PROBLEM — M48.061 SPINAL STENOSIS, LUMBAR: Status: ACTIVE | Noted: 2018-07-16

## 2022-03-19 PROBLEM — M51.9 LUMBAR DISC DISEASE: Status: ACTIVE | Noted: 2018-07-11

## 2022-03-19 PROBLEM — F33.9 RECURRENT DEPRESSION (HCC): Status: ACTIVE | Noted: 2018-01-05

## 2022-03-19 PROBLEM — H61.21 IMPACTED CERUMEN OF RIGHT EAR: Status: ACTIVE | Noted: 2019-09-10

## 2022-03-19 PROBLEM — M87.059 AVASCULAR NECROSIS OF HIP (HCC): Status: ACTIVE | Noted: 2017-08-24

## 2022-03-19 PROBLEM — G47.9 SLEEP DISTURBANCE: Status: ACTIVE | Noted: 2018-04-09

## 2022-03-19 PROBLEM — R06.09 DYSPNEA ON EXERTION: Status: ACTIVE | Noted: 2019-02-19

## 2022-03-19 PROBLEM — I12.9 HYPERTENSION WITH RENAL DISEASE: Status: ACTIVE | Noted: 2017-08-24

## 2022-03-19 PROBLEM — R05.9 COUGH: Status: ACTIVE | Noted: 2019-11-26

## 2022-03-19 PROBLEM — K92.2 GI BLEED: Status: ACTIVE | Noted: 2017-08-24

## 2022-03-19 PROBLEM — R26.81 GAIT INSTABILITY: Status: ACTIVE | Noted: 2019-11-13

## 2022-03-19 PROBLEM — M48.02 CERVICAL STENOSIS OF SPINE: Status: ACTIVE | Noted: 2019-03-26

## 2022-03-19 PROBLEM — G89.29 CHRONIC MIDLINE LOW BACK PAIN WITHOUT SCIATICA: Status: ACTIVE | Noted: 2021-07-06

## 2022-03-19 PROBLEM — N32.81 OVERACTIVE BLADDER: Status: ACTIVE | Noted: 2017-08-24

## 2022-03-19 PROBLEM — K58.9 IBS (IRRITABLE BOWEL SYNDROME): Status: ACTIVE | Noted: 2017-08-24

## 2022-03-20 PROBLEM — J20.9 ACUTE BRONCHITIS: Status: ACTIVE | Noted: 2019-04-30

## 2022-03-20 PROBLEM — R53.83 OTHER FATIGUE: Status: ACTIVE | Noted: 2019-11-13

## 2022-03-20 PROBLEM — M48.02 SPINAL STENOSIS, CERVICAL REGION: Status: ACTIVE | Noted: 2019-03-04

## 2022-03-20 PROBLEM — M19.011 PRIMARY OSTEOARTHRITIS OF RIGHT SHOULDER: Status: ACTIVE | Noted: 2020-02-13

## 2022-03-20 PROBLEM — R25.1 TREMOR: Status: ACTIVE | Noted: 2020-08-13

## 2022-03-20 PROBLEM — R30.0 DYSURIA: Status: ACTIVE | Noted: 2022-01-10

## 2022-03-20 PROBLEM — J84.9 INTERSTITIAL LUNG DISEASE (HCC): Status: ACTIVE | Noted: 2019-04-09

## 2022-03-23 ENCOUNTER — TELEPHONE (OUTPATIENT)
Dept: INTERNAL MEDICINE CLINIC | Age: 77
End: 2022-03-23

## 2022-03-23 NOTE — TELEPHONE ENCOUNTER
Alberto Ventura from At [address] [place] [address]. .    Requesting an extension of home momo PT once per week for the next 4 weeks to continue improvement and safety of stairs     [phone]

## 2022-03-23 NOTE — TELEPHONE ENCOUNTER
Spoke to Kay Jay at [facility] and given approval for extension of PT for an additional 4 weeks.

## 2022-03-31 DIAGNOSIS — I10 HYPERTENSION, UNSPECIFIED TYPE: ICD-10-CM

## 2022-03-31 RX ORDER — HYDROCHLOROTHIAZIDE 25 MG/1
TABLET ORAL
Qty: 90 TABLET | Refills: 3 | Status: SHIPPED | OUTPATIENT
Start: 2022-03-31 | End: 2022-10-20 | Stop reason: SINTOL

## 2022-03-31 NOTE — TELEPHONE ENCOUNTER
PCP: Christoph Gregory MD    Last appt: 5/13/2020  Future Appointments   Date Time Provider Gwendolyn Loving   4/15/2022 10:30 AM Christoph Gregory MD [facility] BS AMB   5/3/2022  8:50 AM Miles Serra MD [facility] [address] BS AMB   5/11/2022  8:30 AM Christoph Gregory MD [facility] BS AMB       Last refilled:4/5/21    Requested Prescriptions     Pending Prescriptions Disp Refills    hydroCHLOROthiazide (HYDRODIURIL) 25 mg tablet [Pharmacy Med Name: HYDROCHLOROTHIAZIDE TABS 25MG] 90 Tablet 3     Sig: TAKE 1 TABLET DAILY FOR FLUID AND BLOOD PRESSURE

## 2022-04-08 ENCOUNTER — TELEPHONE (OUTPATIENT)
Dept: INTERNAL MEDICINE CLINIC | Age: 77
End: 2022-04-08

## 2022-04-08 NOTE — TELEPHONE ENCOUNTER
Christoph Gregory MD  You 2 hours ago (2:06 PM)     KR    PT OK and I can see if SOB increases        Left a message with instructions for Carlos Worrell's on her voicemail.

## 2022-04-08 NOTE — TELEPHONE ENCOUNTER
Dinh Shed from At [facility] called. .. Gave Vitals for patient:  Blood Sugar: 333  Heart Rate: 99 and moved up to 108 after activity    Stated that the patient was coughing more this week. Requesting a verbal re certification for Physical Therapy to work on patient's gate.     Alison Smiley [phone]

## 2022-04-14 PROBLEM — R30.0 DYSURIA: Status: RESOLVED | Noted: 2022-01-10 | Resolved: 2022-04-14

## 2022-04-14 PROBLEM — R06.09 DYSPNEA ON EXERTION: Status: RESOLVED | Noted: 2019-02-19 | Resolved: 2022-04-14

## 2022-04-14 PROBLEM — N30.90 CYSTITIS: Status: RESOLVED | Noted: 2022-02-08 | Resolved: 2022-04-14

## 2022-04-14 PROBLEM — R53.83 OTHER FATIGUE: Status: RESOLVED | Noted: 2019-11-13 | Resolved: 2022-04-14

## 2022-04-14 PROBLEM — R05.9 COUGH: Status: RESOLVED | Noted: 2019-11-26 | Resolved: 2022-04-14

## 2022-04-14 PROBLEM — Z00.00 MEDICARE ANNUAL WELLNESS VISIT, SUBSEQUENT: Status: ACTIVE | Noted: 2020-02-12

## 2022-04-14 NOTE — PROGRESS NOTES
This is a Subsequent Medicare Annual Wellness Visit providing Personalized Prevention Plan Services (PPPS) (Performed 12 months after initial AWV and PPPS )    I have reviewed the patient's medical history in detail and updated the computerized patient record. She presents today for Medicare subsequent annual wellness examination and screening questionnaire. She is also in follow-up of her multiple medical problems include hypertension, diabetes, hyperlipidemia, irritable bowel syndrome, rosacea, allergic rhinitis, severe interstitial lung disease, DJD, lumbar disc disease, CKD stage II, obesity, history of polymyalgia rheumatica, prior elevated liver enzymes, vitamin D deficiency, anxiety with depression, tremor and other multiple medical problems. She has recent been seen by her pulmonologist who suggested she decrease her prednisone from 40 mg daily to 10 mg daily by going down to 10 mg weekly and she has successfully did decrease it to 10 mg daily. Unfortunately after going to 10 mg a day she felt much worse with increased shortness of breath and has increased it back to 30 mg daily on her own and actually feels better with less coughing. She denies any chest pain, palpitations, PND, orthopnea or other cardiorespiratory complaints other than the shortness of breath when she does note she has more mobility and energy when she is on 30 mg of prednisone. She notes no GI or  complaints. She notes no headaches, dizziness or neurologic complaints. She has no change of her chronic arthritic complaints and she has no other complaints on complete review of systems. She does have an appointment in a couple of weeks to see Dr. Madyson Irvin from endocrinology but has adjusted her insulin according to his recommendations made to her at the last appointment with me and her blood sugars are running better.     History     Past Medical History:   Diagnosis Date    Abnormal LFTs 08/24/2017    FATTY LIVER    Arthritis OSTEO    Avascular necrosis of hip ([address] 08/24/2017    BILAT HIPS    Breast CA ([address] 1989, 2001    BILATERAL; SURGERY, CHEMO    Chronic pain     CKD (chronic kidney disease), stage II 8/24/2017    Coagulation disorder ([address] 1984    ITP  (DR CHU BRADSHAW) - PLATELETS DROPPED TO 5K    Depression     Diabetes ([address]     TYPE 2; NIDDM    DJD (degenerative joint disease), multiple sites 8/24/2017    GI bleed 2008    HEMORRHOIDS    Hyperlipemia     Hypertension with renal disease 8/24/2017    IBS (irritable bowel syndrome) 8/24/2017    Ill-defined condition 2015    PNEUMONIA X2 - HOSPITALIZED IN 2015    Interstitial lung disease ([facility] 04/01/2019    Liver disease     FATTY LIVER    Myalgia 8/24/2017    On statin therapy 8/24/2017    Overactive bladder 8/24/2017    Pneumonia 04/2015    HOSPITALIZED 3 WEEKS.     Polymyalgia rheumatica ([address] 8/24/2017    Prophylactic antibiotic 8/24/2017    Reflex abnormality     acid reflex    Rosacea       Past Surgical History:   Procedure Laterality Date    HX APPENDECTOMY  1950    HX BREAST BIOPSY Bilateral     HX CATARACT REMOVAL Bilateral     HX COLONOSCOPY      HX ENDOSCOPY      HX GI      COLONOSCOPY    HX HEENT      WISDOM TEETH    HX HYSTERECTOMY  2000S    HX MASTECTOMY Right 1989    HX MASTECTOMY Left 2001    HX ORTHOPAEDIC  2008    LUMBAR FUSION    HX ORTHOPAEDIC  2018    RE-DO LUMBAR FUSION, AND ADDED THORACIC FUSION    HX ORTHOPAEDIC  03/26/2019    neckectomy    HX TUBAL LIGATION  1981    HX UROLOGICAL  2000s    BLADDER SLING    VT BREAST SURGERY PROCEDURE UNLISTED  1993, 2002    RECONSTRUCTION X2    VT TOTAL HIP ARTHROPLASTY Left 11/16/2016    ANTERIOR APPROACH, DR Gwendolyn De La Torre; (POSTOP: STANDS ONE INCH TALLER ON LEFT FOOT)    VT TOTAL HIP ARTHROPLASTY Right 12/2016    ANTERIOR APPROACH (DR Gwendolyn De La Torre)     Social History     Tobacco Use    Smoking status: Never Smoker    Smokeless tobacco: Never Used   Vaping Use    Vaping Use: Never used Substance Use Topics    Alcohol use: No    Drug use: No     Current Outpatient Medications   Medication Sig Dispense Refill    hydroCHLOROthiazide (HYDRODIURIL) 25 mg tablet TAKE 1 TABLET DAILY FOR FLUID AND BLOOD PRESSURE 90 Tablet 3    potassium chloride (K-DUR, KLOR-CON M20) 20 mEq tablet Take 1 Tablet by mouth daily. 90 Tablet 3    predniSONE (DELTASONE) 10 mg tablet Take 4 tablets daily 360 Tablet 3    oxazepam (SERAX) 15 mg capsule TAKE 2 CAPSULES BY MOUTH NIGHTLY AT BEDTIME 180 Capsule 3    insulin NPH (HUMULIN N) 100 unit/mL (3 mL) inpn 36 U daily with your prednisone dose 3 Pen 5    insulin glargine (Lantus Solostar U-100 Insulin) 100 unit/mL (3 mL) inpn INJECT 35 UNITS BID 10 Pen 3    clonazePAM (KlonoPIN) 1 mg tablet Take 1 Tablet by mouth two (2) times a day. Max Daily Amount: 2 mg. 60 Tablet 0    mupirocin (BACTROBAN) 2 % ointment APPLY ONCE A DAY TO OPEN SORE      benzonatate (TESSALON) 200 mg capsule TAKE 1 CAPSULE BY MOUTH THREE TIMES DAILY AS NEEDED FOR COUGH - SWALLOW CAPSULE WHOLE (DO NOT CRUSH) 60 Capsule 1    albuterol (ProAir HFA) 90 mcg/actuation inhaler Take 1 Puff by inhalation every four (4) hours as needed for Wheezing or Shortness of Breath. 18 g 5    rosuvastatin (CRESTOR) 20 mg tablet Take 1 Tablet by mouth nightly. 90 Tablet 3    buPROPion XL (WELLBUTRIN XL) 150 mg tablet TAKE 1 TABLET DAILY 90 Tablet 3    sertraline (ZOLOFT) 100 mg tablet TAKE 1 TABLET DAILY 90 Tablet 3    carvediloL (COREG) 6.25 mg tablet TAKE 1 TABLET TWICE A  Tablet 3    BD Ultra-Fine Short Pen Needle 31 gauge x 5/16\" ndle USE UNDER THE SKIN DAILY. USE WITH LANTUS FLEX PEN DAILY 1 Package 3    fenofibrate nanocrystallized (TRICOR) 145 mg tablet Take 1 Tablet by mouth daily.  90 Tablet 3    glucose blood VI test strips (True Metrix Glucose Test Strip) strip USE ONCE DAILY AND RECORD RESULTS IN A NOTEBOOK ALSO RECORD LAST MEALTIME IN NOTEBOOK 100 Strip 3    clemastine 2.68 mg tab tablet Take 2.68 mg by mouth two (2) times a day.  clindamycin (CLEOCIN) 300 mg capsule Take 2 capsules 1 hour before dental procedure 10 Cap 0    pantoprazole (PROTONIX) 40 mg tablet Take 40 mg by mouth daily.  montelukast sodium (SINGULAIR PO) Take 10 mg by mouth nightly.  10 mg tab       Allergies   Allergen Reactions    Metformin Diarrhea    Statins-Hmg-Coa Reductase Inhibitors Myalgia     Myalgia with  multiple statins  Takes pravachol     Codeine Rash     Rash on thighs    Darvon [Propoxyphene] Other (comments)     \"I see worms\"    Pcn [Penicillins] Rash    Sulfa (Sulfonamide Antibiotics) Rash     Family History   Problem Relation Age of Onset    Heart Disease Mother     Kidney Disease Mother     Lung Disease Mother         COPD    Diabetes Brother     Pacemaker Brother     Kidney Disease Brother     Hypertension Brother     Anesth Problems Neg Hx        Patient Active Problem List    Diagnosis    [facility] disease [facility]    Primary osteoarthritis involving multiple joints    Hypertension with renal disease    CKD (chronic kidney disease), stage II    Controlled type 2 diabetes mellitus with stage 2 chronic kidney disease, with long-term current use of insulin ([facility])    Mixed hyperlipidemia    Vitamin D deficiency    IBS (irritable bowel syndrome)    Non-seasonal allergic rhinitis    Class 1 obesity due to excess calories without serious comorbidity with body mass index (BMI) of 33.0 to 33.9 in adult    Chronic midline low back pain without sciatica    Tremor    Acute non-recurrent maxillary sinusitis    Primary osteoarthritis of right shoulder    Alcohol screening    Medicare annual wellness visit, subsequent    Glossitis    Gait instability    Non-recurrent acute suppurative otitis media of left ear without spontaneous rupture of tympanic membrane    Impacted cerumen of right ear    Acute bronchitis    Cervical stenosis of spine    Spinal stenosis, cervical region    Spinal stenosis, lumbar    Lumbar disc disease    Sleep disturbance    Recurrent depression (HCC)    GI bleed    Overactive bladder    Polymyalgia rheumatica (HCC)    Avascular necrosis of hip (HCC)    Abnormal LFTs    Rosacea    Anxiety       Patient Care Team:  Adrianna Vivas MD as PCP - General (Internal Medicine)  Adrianna Vivas MD as PCP - [facility] Empaneled Provider  Kavin Covarrubias MD (Orthopedic Surgery)    Depression Risk Factor Screening:     3 most recent PHQ Screens 4/15/2022   Little interest or pleasure in doing things Several days   Feeling down, depressed, irritable, or hopeless Several days   Total Score PHQ 2 2   Trouble falling or staying asleep, or sleeping too much -   Feeling tired or having little energy -   Poor appetite, weight loss, or overeating -   Feeling bad about yourself - or that you are a failure or have let yourself or your family down -   Trouble concentrating on things such as school, work, reading, or watching TV -   Moving or speaking so slowly that other people could have noticed; or the opposite being so fidgety that others notice -   Thoughts of being better off dead, or hurting yourself in some way -   PHQ 9 Score -   How difficult have these problems made it for you to do your work, take care of your home and get along with others -     Alcohol Risk Factor Screening:   Do you average more than 1 drink per night or more than 7 drinks a week:  No    On any one occasion in the past three months have you have had more than 3 drinks containing alcohol:  No    Functional Ability and Level of Safety:     Fall Risk     Fall Risk Assessment, last 12 mths 4/15/2022   Able to walk? Yes   Fall in past 12 months? 0   Do you feel unsteady? 1   Are you worried about falling 1   Is TUG test greater than 12 seconds?  1   Is the gait abnormal? 1   Number of falls in past 12 months 0   Fall with injury? -       Hearing Loss   mild    Activities of Daily Living Self-care. ADL Assessment 4/15/2022   Feeding yourself No Help Needed   Getting from bed to chair No Help Needed   Getting dressed No Help Needed   Bathing or showering No Help Needed   Walk across the room (includes cane/walker) Help Needed   Using the telphone No Help Needed   Taking your medications No Help Needed   Preparing meals Help Needed   Managing money (expenses/bills) No Help Needed   Moderately strenuous housework (laundry) Help Needed   Shopping for personal items (toiletries/medicines) Help Needed   Shopping for groceries Help Needed   Driving Help Needed   Climbing a flight of stairs Help Needed   Getting to places beyond walking distances Help Needed       Abuse Screen   Patient is not abused    Social History     Social History Narrative    Not on file       Review of Systems      ROS:    Constitutional: She denies fevers, weight loss, sweats. Eyes: No blurred or double vision. ENT: No difficulty with swallowing, taste, speech or smell. NECK: no stiffness swelling or lymph node enlargement  Respiratory: No cough wheezing change of her chronic shortness of breath now that she is back up to 30 mg of prednisone daily. Cardiovascular: Denies chest pain, palpitations, unexplained indigestion or syncope. Breast: She has noted no masses or lumps and no discharge or axillary swelling  Gastrointestinal:  No changes in bowel movements, no abdominal pain, no bloating. Genitourinary: No discharge or abnormal bleeding or pain  Extremities: No joint pain, stiffness or swelling. Neurological:  No numbness, tingling, burring paresthesias or loss of motor strength. No syncope, dizziness or frequent headache  Skin:  No recent rashes or mole changes. Psychiatric/Behavioral:  Negative for depression. The patient is not nervous/anxious.    HEMATOLOGIC: no easy bruising or bleeding gums  Endocrine: no sweats of urinary frequency or excessive thirst    Physical Examination     Evaluation of Cognitive Function:  Mood/affect:  happy  Appearance: age appropriate  Family member/caregiver input: None    Vitals:    04/15/22 1055   BP: 126/78   Pulse: (!) 103   Resp: 19   Temp: 98.6 °F (37 °C)   TempSrc: Oral   SpO2: 94%   Weight: 197 lb 6.4 oz (89.5 kg)   Height: 5' 3\" (1.6 m)   PainSc:   2   PainLoc: Back        PHYSICAL EXAM:    General appearance - alert, well appearing, and in no distress  Mental status - alert, oriented to person, place, and time  HEENT:  Ears - bilateral TM's and external ear canals clear  Eyes - pupillary responses were normal.  Extraocular muscle function intact. Lids and conjunctiva not injected. Fundoscopic exam revealed sharp disc margins. eye movements intact  Pharynx- clear with teeth in good repair. No masses were noted  Neck - supple without thyromegaly or burit. No JVD noted  Lungs - clear to auscultation and percussion with overall decreased breath sounds but no rales, rhonchi or wheezes  Cardiac- normal rate, regular rhythm without murmurs. PMI not displaced. No gallop, rub or click  Breast: deferred to GYN  Abdomen - flat, soft, non-tender without palpable organomegaly or mass. No pulsatile mass was felt, and not bruit was heard. Bowel sounds were active   Female - deferred to GYN  Rectal - deferred to GYN  Extremities -  no clubbing cyanosis or edema  Lymphatics - no palpable lymphadenopathy, no hepatosplenomegaly  Peripheral vascular - Dorsalis pedis and posterior tibial pulses felt without difficulty  Skin - no rash or unusual mole change noted  Neurological - Cranial nerves II-XII grossly intact. Motor strength 5/5. DTR's 2+ and symmetric.   Station and gait normal  Back exam - full range of motion, no tenderness, palpable spasm or pain on motion  Musculoskeletal - no joint tenderness, deformity or swelling  Hematologic: no purpura, petechiae or bruising    Results for orders placed or performed in visit on 95/92/81   METABOLIC PANEL, BASIC   Result Value Ref Range Sodium 136 136 - 145 mmol/L    Potassium 3.2 (L) 3.5 - 5.1 mmol/L    Chloride 102 97 - 108 mmol/L    CO2 27 21 - 32 mmol/L    Anion gap 7 5 - 15 mmol/L    Glucose 167 (H) 65 - 100 mg/dL    BUN 36 (H) 6 - 20 MG/DL    Creatinine 0.90 0.55 - 1.02 MG/DL    BUN/Creatinine ratio 40 (H) 12 - 20      GFR est AA >60 >60 ml/min/1.73m2    GFR est non-AA >60 >60 ml/min/1.73m2    Calcium 9.6 8.5 - 10.1 MG/DL       Advice/Referrals/Counseling   Education and counseling provided:  Are appropriate based on today's review and evaluation  End-of-Life planning (with patient's consent)  Pneumococcal Vaccine  Influenza Vaccine  Colorectal cancer screening tests      Assessment/Plan     ASSESSMENT:   1. Hypertension with renal disease    2. Controlled type 2 diabetes mellitus with stage 2 chronic kidney disease, with long-term current use of insulin ([address]    3. Mixed hyperlipidemia    4. Interstitial lung disease ([address]    5. Primary osteoarthritis involving multiple joints    6. CKD (chronic kidney disease), stage II    7. Class 1 obesity due to excess calories without serious comorbidity with body mass index (BMI) of 33.0 to 33.9 in adult    8. Irritable bowel syndrome, unspecified type    9. Non-seasonal allergic rhinitis, unspecified trigger    10. Vitamin D deficiency    11. Avascular necrosis of bone of hip, unspecified laterality ([address]    12. Polymyalgia rheumatica ([address]    13. Recurrent depression ([address]    14. Spinal stenosis of lumbar region with neurogenic claudication    15. Abnormal LFTs    16. Anxiety    17. Alcohol screening    18. Tremor    19. Medicare annual wellness visit, subsequent      Impression  1. Hypertension that is controlled so continue current therapy reviewed with her. 2.  Diabetes mellitus we will see what the blood sugar and A1c look like today but since she is added the NPH insulin dosing with the prednisone her blood sugars have been running better.   3.  Hyperlipidemia prior lab reviewed repeat status pending I will adjust if needed. 4. Interstitial lung disease O2 sat when she initially arrived in the office today 92% and improved to 94% today. She is currently not using oxygen. She said that home health nurse had recommended hospice for her and she wanted my opinion. She is still active getting around with a walker and now she is back on 30 mg of prednisone her lungs seem to be doing much better. Based upon this I do not feel like she is ready for hospice at this point and she is in agreement with me. 5. DJD chronic but stable  6. CKD stage II repeat status pending  7. Obesity we did discuss diet, exercise and weight reduction for overall health benefit in note her weight is down 3 pounds and has difficulty exercise with her lung disease. 8.  IBS that is stable  9. Allergic rhinitis controlled  10. Vitamin D deficiency repeat status pending  11. Avascular necrosis of the hip which was treated surgically  12. Polymyalgia rheumatica in remission  13. Depression that is controlled  14. Spinal stenosis lumbar seems to be stable  15. Prior elevated liver enzymes repeat status is pending  16. Anxiety that seems to be controlled  17. Alcohol screening is done and she claims not to drink any alcohol at all now. We did spend 5 minutes discussing excessive alcohol intake in females who averaged more than 1 drink per day with increased cardiovascular risk and increased risk of liver disease and other GI effects. 18.  Tremor that seems to be controlled  Medicare subsequent annual wellness examination screening questionnaires completed today. The results were reviewed with her and her questions were answered. Lifestyle recommendations and modifications discussed and made. I will call with lab results and make further recommendations or adjustments if necessary.   Follow-up as scheduled for 3 months or sooner should the be a problem for her overall medical conditions but I will see her back in 1 month regarding her interstitial lung disease. Hospice discussed with patient as noted above as it was suggested by her home health nurse and I do not think at this point she is ready for hospice although I think that will be in her future. Today I spent 40 minutes in direct care of this patient not including time spent on Medicare wellness examination. Greater than 50% in counseling coordination of care. PLAN:  .  Orders Placed This Encounter    CBC WITH AUTOMATED DIFF    METABOLIC PANEL, COMPREHENSIVE    LIPID PANEL    CK    HEMOGLOBIN A1C WITH EAG    T4 (THYROXINE)    TSH 3RD GENERATION    URINALYSIS W/ REFLEX CULTURE    VITAMIN D, 25 HYDROXY    MICROALBUMIN, UR, RAND W/ MICROALB/CREAT RATIO         ATTENTION:   This medical record was transcribed using an electronic medical records system. Although proofread, it may and can contain electronic and spelling errors. Other human spelling and other errors may be present. Corrections may be executed at a later time. Please feel free to contact us for any clarifications as needed. Follow-up and Dispositions    · Return in about 4 weeks (around 5/13/2022). Veto Santana MD    Recommended healthy diet low in carbohydrates, fats, sodium and cholesterol. Recommended regular cardiovascular exercise 3-6 times per week for 30-60 minutes daily. Current Outpatient Medications   Medication Sig Dispense Refill    hydroCHLOROthiazide (HYDRODIURIL) 25 mg tablet TAKE 1 TABLET DAILY FOR FLUID AND BLOOD PRESSURE 90 Tablet 3    potassium chloride (K-DUR, KLOR-CON M20) 20 mEq tablet Take 1 Tablet by mouth daily.  90 Tablet 3    predniSONE (DELTASONE) 10 mg tablet Take 4 tablets daily 360 Tablet 3    oxazepam (SERAX) 15 mg capsule TAKE 2 CAPSULES BY MOUTH NIGHTLY AT BEDTIME 180 Capsule 3    insulin NPH (HUMULIN N) 100 unit/mL (3 mL) inpn 36 U daily with your prednisone dose 3 Pen 5    insulin glargine (Lantus Solostar U-100 Insulin) 100 unit/mL (3 mL) inpn INJECT 35 UNITS BID 10 Pen 3    clonazePAM (KlonoPIN) 1 mg tablet Take 1 Tablet by mouth two (2) times a day. Max Daily Amount: 2 mg. 60 Tablet 0    mupirocin (BACTROBAN) 2 % ointment APPLY ONCE A DAY TO OPEN SORE      benzonatate (TESSALON) 200 mg capsule TAKE 1 CAPSULE BY MOUTH THREE TIMES DAILY AS NEEDED FOR COUGH - SWALLOW CAPSULE WHOLE (DO NOT CRUSH) 60 Capsule 1    albuterol (ProAir HFA) 90 mcg/actuation inhaler Take 1 Puff by inhalation every four (4) hours as needed for Wheezing or Shortness of Breath. 18 g 5    rosuvastatin (CRESTOR) 20 mg tablet Take 1 Tablet by mouth nightly. 90 Tablet 3    buPROPion XL (WELLBUTRIN XL) 150 mg tablet TAKE 1 TABLET DAILY 90 Tablet 3    sertraline (ZOLOFT) 100 mg tablet TAKE 1 TABLET DAILY 90 Tablet 3    carvediloL (COREG) 6.25 mg tablet TAKE 1 TABLET TWICE A  Tablet 3    BD Ultra-Fine Short Pen Needle 31 gauge x 5/16\" ndle USE UNDER THE SKIN DAILY. USE WITH LANTUS FLEX PEN DAILY 1 Package 3    fenofibrate nanocrystallized (TRICOR) 145 mg tablet Take 1 Tablet by mouth daily. 90 Tablet 3    glucose blood VI test strips (True Metrix Glucose Test Strip) strip USE ONCE DAILY AND RECORD RESULTS IN A NOTEBOOK ALSO RECORD LAST MEALTIME IN NOTEBOOK 100 Strip 3    clemastine 2.68 mg tab tablet Take 2.68 mg by mouth two (2) times a day.  clindamycin (CLEOCIN) 300 mg capsule Take 2 capsules 1 hour before dental procedure 10 Cap 0    pantoprazole (PROTONIX) 40 mg tablet Take 40 mg by mouth daily.  montelukast sodium (SINGULAIR PO) Take 10 mg by mouth nightly. 10 mg tab         No results found for any visits on 04/15/22. Verbal and written instructions (see AVS) provided. Patient expresses understanding of diagnosis and treatment plan.     Dimitrios Perdomo MD

## 2022-04-15 ENCOUNTER — OFFICE VISIT (OUTPATIENT)
Dept: INTERNAL MEDICINE CLINIC | Age: 77
End: 2022-04-15
Payer: MEDICARE

## 2022-04-15 VITALS
OXYGEN SATURATION: 94 % | BODY MASS INDEX: 34.98 KG/M2 | HEART RATE: 103 BPM | TEMPERATURE: 98.6 F | DIASTOLIC BLOOD PRESSURE: 78 MMHG | RESPIRATION RATE: 19 BRPM | SYSTOLIC BLOOD PRESSURE: 126 MMHG | HEIGHT: 63 IN | WEIGHT: 197.4 LBS

## 2022-04-15 DIAGNOSIS — E55.9 VITAMIN D DEFICIENCY: ICD-10-CM

## 2022-04-15 DIAGNOSIS — J30.89 NON-SEASONAL ALLERGIC RHINITIS, UNSPECIFIED TRIGGER: ICD-10-CM

## 2022-04-15 DIAGNOSIS — M35.3 POLYMYALGIA RHEUMATICA (HCC): ICD-10-CM

## 2022-04-15 DIAGNOSIS — Z00.00 MEDICARE ANNUAL WELLNESS VISIT, SUBSEQUENT: ICD-10-CM

## 2022-04-15 DIAGNOSIS — Z13.39 ALCOHOL SCREENING: ICD-10-CM

## 2022-04-15 DIAGNOSIS — Z79.4 CONTROLLED TYPE 2 DIABETES MELLITUS WITH STAGE 2 CHRONIC KIDNEY DISEASE, WITH LONG-TERM CURRENT USE OF INSULIN (HCC): ICD-10-CM

## 2022-04-15 DIAGNOSIS — R79.89 ABNORMAL LFTS: ICD-10-CM

## 2022-04-15 DIAGNOSIS — K58.9 IRRITABLE BOWEL SYNDROME, UNSPECIFIED TYPE: ICD-10-CM

## 2022-04-15 DIAGNOSIS — M15.9 PRIMARY OSTEOARTHRITIS INVOLVING MULTIPLE JOINTS: ICD-10-CM

## 2022-04-15 DIAGNOSIS — J84.9 INTERSTITIAL LUNG DISEASE (HCC): ICD-10-CM

## 2022-04-15 DIAGNOSIS — I12.9 HYPERTENSION WITH RENAL DISEASE: Primary | ICD-10-CM

## 2022-04-15 DIAGNOSIS — E66.09 CLASS 1 OBESITY DUE TO EXCESS CALORIES WITHOUT SERIOUS COMORBIDITY WITH BODY MASS INDEX (BMI) OF 33.0 TO 33.9 IN ADULT: ICD-10-CM

## 2022-04-15 DIAGNOSIS — E11.22 CONTROLLED TYPE 2 DIABETES MELLITUS WITH STAGE 2 CHRONIC KIDNEY DISEASE, WITH LONG-TERM CURRENT USE OF INSULIN (HCC): ICD-10-CM

## 2022-04-15 DIAGNOSIS — E78.2 MIXED HYPERLIPIDEMIA: ICD-10-CM

## 2022-04-15 DIAGNOSIS — F41.9 ANXIETY: ICD-10-CM

## 2022-04-15 DIAGNOSIS — R25.1 TREMOR: ICD-10-CM

## 2022-04-15 DIAGNOSIS — N18.2 CKD (CHRONIC KIDNEY DISEASE), STAGE II: ICD-10-CM

## 2022-04-15 DIAGNOSIS — F33.9 RECURRENT DEPRESSION (HCC): ICD-10-CM

## 2022-04-15 DIAGNOSIS — N18.2 CONTROLLED TYPE 2 DIABETES MELLITUS WITH STAGE 2 CHRONIC KIDNEY DISEASE, WITH LONG-TERM CURRENT USE OF INSULIN (HCC): ICD-10-CM

## 2022-04-15 DIAGNOSIS — M48.062 SPINAL STENOSIS OF LUMBAR REGION WITH NEUROGENIC CLAUDICATION: ICD-10-CM

## 2022-04-15 DIAGNOSIS — M87.059 AVASCULAR NECROSIS OF BONE OF HIP, UNSPECIFIED LATERALITY (HCC): ICD-10-CM

## 2022-04-15 LAB
APPEARANCE UR: ABNORMAL
BACTERIA URNS QL MICRO: ABNORMAL /HPF
BILIRUB UR QL CFM: POSITIVE
COLOR UR: ABNORMAL
CREAT UR-MCNC: 368 MG/DL
EPITH CASTS URNS QL MICRO: ABNORMAL /LPF
GLUCOSE UR STRIP.AUTO-MCNC: NEGATIVE MG/DL
HGB UR QL STRIP: NEGATIVE
HYALINE CASTS URNS QL MICRO: ABNORMAL /LPF (ref 0–5)
KETONES UR QL STRIP.AUTO: NEGATIVE MG/DL
LEUKOCYTE ESTERASE UR QL STRIP.AUTO: ABNORMAL
MICROALBUMIN UR-MCNC: 30.5 MG/DL
MICROALBUMIN/CREAT UR-RTO: 83 MG/G (ref 0–30)
NITRITE UR QL STRIP.AUTO: NEGATIVE
PH UR STRIP: >8.5 [PH] (ref 5–8)
PROT UR STRIP-MCNC: 300 MG/DL
RBC #/AREA URNS HPF: ABNORMAL /HPF (ref 0–5)
SP GR UR REFRACTOMETRY: 1.02 (ref 1–1.03)
TRI-PHOS CRY URNS QL MICRO: ABNORMAL
UA: UC IF INDICATED,UAUC: ABNORMAL
UROBILINOGEN UR QL STRIP.AUTO: 1 EU/DL (ref 0.2–1)
WBC URNS QL MICRO: ABNORMAL /HPF (ref 0–4)

## 2022-04-15 PROCEDURE — G8427 DOCREV CUR MEDS BY ELIG CLIN: HCPCS | Performed by: INTERNAL MEDICINE

## 2022-04-15 PROCEDURE — 1101F PT FALLS ASSESS-DOCD LE1/YR: CPT | Performed by: INTERNAL MEDICINE

## 2022-04-15 PROCEDURE — 3017F COLORECTAL CA SCREEN DOC REV: CPT | Performed by: INTERNAL MEDICINE

## 2022-04-15 PROCEDURE — G8752 SYS BP LESS 140: HCPCS | Performed by: INTERNAL MEDICINE

## 2022-04-15 PROCEDURE — 3046F HEMOGLOBIN A1C LEVEL >9.0%: CPT | Performed by: INTERNAL MEDICINE

## 2022-04-15 PROCEDURE — G8399 PT W/DXA RESULTS DOCUMENT: HCPCS | Performed by: INTERNAL MEDICINE

## 2022-04-15 PROCEDURE — G8754 DIAS BP LESS 90: HCPCS | Performed by: INTERNAL MEDICINE

## 2022-04-15 PROCEDURE — G0439 PPPS, SUBSEQ VISIT: HCPCS | Performed by: INTERNAL MEDICINE

## 2022-04-15 PROCEDURE — 1090F PRES/ABSN URINE INCON ASSESS: CPT | Performed by: INTERNAL MEDICINE

## 2022-04-15 PROCEDURE — G8536 NO DOC ELDER MAL SCRN: HCPCS | Performed by: INTERNAL MEDICINE

## 2022-04-15 PROCEDURE — 99215 OFFICE O/P EST HI 40 MIN: CPT | Performed by: INTERNAL MEDICINE

## 2022-04-15 PROCEDURE — 2022F DILAT RTA XM EVC RTNOPTHY: CPT | Performed by: INTERNAL MEDICINE

## 2022-04-15 PROCEDURE — G8417 CALC BMI ABV UP PARAM F/U: HCPCS | Performed by: INTERNAL MEDICINE

## 2022-04-15 NOTE — PROGRESS NOTES
Chief Complaint   Patient presents with   [facility] Annual Wellness Visit     Visit Vitals  /78 (BP 1 Location: Left upper arm, BP Patient Position: Sitting, BP Cuff Size: Adult)   Pulse (!) 103   Temp 98.6 °F (37 °C) (Oral)   Resp 19   Ht 5' 3\" (1.6 m)   Wt 197 lb 6.4 oz (89.5 kg)   SpO2 94%   BMI 34.97 kg/m²     1. Have you been to the ER, urgent care clinic since your last visit? Hospitalized since your last visit? No    2. Have you seen or consulted any other health care providers outside of the [address] [place] [address] since your last visit? Include any pap smears or colon screening.  No      Depression Risk Factor Screening:     3 most recent PHQ Screens 4/15/2022   Little interest or pleasure in doing things Several days   Feeling down, depressed, irritable, or hopeless Several days   Total Score PHQ 2 2   Trouble falling or staying asleep, or sleeping too much -   Feeling tired or having little energy -   Poor appetite, weight loss, or overeating -   Feeling bad about yourself - or that you are a failure or have let yourself or your family down -   Trouble concentrating on things such as school, work, reading, or watching TV -   Moving or speaking so slowly that other people could have noticed; or the opposite being so fidgety that others notice -   Thoughts of being better off dead, or hurting yourself in some way -   PHQ 9 Score -   How difficult have these problems made it for you to do your work, take care of your home and get along with others -       Functional Ability and Level of Safety:     Activities of Daily Living  ADL Assessment 4/15/2022   Feeding yourself No Help Needed   Getting from bed to chair No Help Needed   Getting dressed No Help Needed   Bathing or showering No Help Needed   Walk across the room (includes cane/walker) Help Needed   Using the telphone No Help Needed   Taking your medications No Help Needed   Preparing meals Help Needed   Managing money (expenses/bills) No Help Needed Moderately strenuous housework (laundry) Help Needed   Shopping for personal items (toiletries/medicines) Help Needed   Shopping for groceries Help Needed   Driving Help Needed   Climbing a flight of stairs Help Needed   Getting to places beyond walking distances Help Needed       Fall Risk  Fall Risk Assessment, last 12 mths 4/15/2022   Able to walk? Yes   Fall in past 12 months? 0   Do you feel unsteady? 1   Are you worried about falling 1   Is TUG test greater than 12 seconds? 1   Is the gait abnormal? 1   Number of falls in past 12 months 0   Fall with injury? -       Abuse Screen  Abuse Screening Questionnaire 4/15/2022   Do you ever feel afraid of your partner? N   Are you in a relationship with someone who physically or mentally threatens you? N   Is it safe for you to go home?  Y         Patient Care Team   Patient Care Team:  Jayshree Lechuga MD as PCP - General (Internal Medicine)  Jayshree Lechuga MD as PCP - [facility] Empaneled Provider  Baron Beckett MD (Orthopedic Surgery)

## 2022-04-15 NOTE — PATIENT INSTRUCTIONS
Medicare Wellness Visit, Female     The best way to live healthy is to have a lifestyle where you eat a well-balanced diet, exercise regularly, limit alcohol use, and quit all forms of tobacco/nicotine, if applicable. Regular preventive services are another way to keep healthy. Preventive services (vaccines, screening tests, monitoring & exams) can help personalize your care plan, which helps you manage your own care. Screening tests can find health problems at the earliest stages, when they are easiest to treat. [facility] follows the current, evidence-based guidelines published by the [facility] Aaron Santos ([facility]) when recommending preventive services for our patients. Because we follow these guidelines, sometimes recommendations change over time as research supports it. (For example, mammograms used to be recommended annually. Even though Medicare will still pay for an annual mammogram, the newer guidelines recommend a mammogram every two years for women of average risk). Of course, you and your doctor may decide to screen more often for some diseases, based on your risk and your co-morbidities (chronic disease you are already diagnosed with). Preventive services for you include:  - Medicare offers their members a free annual wellness visit, which is time for you and your primary care provider to discuss and plan for your preventive service needs. Take advantage of this benefit every year!  -All adults over the age of 72 should receive the recommended pneumonia vaccines. Current USPSTF guidelines recommend a series of two vaccines for the best pneumonia protection.   -All adults should have a flu vaccine yearly and a tetanus vaccine every 10 years.   -All adults age 48 and older should receive the shingles vaccines (series of two vaccines).       -All adults age 38-68 who are overweight should have a diabetes screening test once every three years.   -All adults born between [birth year] and 1965 should be screened once for Hepatitis C.  -Other screening tests and preventive services for persons with diabetes include: an eye exam to screen for diabetic retinopathy, a kidney function test, a foot exam, and stricter control over your cholesterol.   -Cardiovascular screening for adults with routine risk involves an electrocardiogram (ECG) at intervals determined by your doctor.   -Colorectal cancer screenings should be done for adults age 54-65 with no increased risk factors for colorectal cancer. There are a number of acceptable methods of screening for this type of cancer. Each test has its own benefits and drawbacks. Discuss with your doctor what is most appropriate for you during your annual wellness visit. The different tests include: colonoscopy (considered the best screening method), a fecal occult blood test, a fecal DNA test, and sigmoidoscopy.    -A bone mass density test is recommended when a woman turns 65 to screen for osteoporosis. This test is only recommended one time, as a screening. Some providers will use this same test as a disease monitoring tool if you already have osteoporosis. -Breast cancer screenings are recommended every other year for women of normal risk, age 54-69.  -Cervical cancer screenings for women over age 72 are only recommended with certain risk factors.      Here is a list of your current Health Maintenance items (your personalized list of preventive services) with a due date:  Health Maintenance Due   Topic Date Due    COVID-19 Vaccine (1) Never done    DTaP/Tdap/Td  (1 - Tdap) Never done    Shingles Vaccine (1 of 2) Never done    Diabetic Foot Care  04/02/2022    Albumin Urine Test  04/02/2022    Hemoglobin A1C    04/10/2022    Annual Well Visit  04/03/2022

## 2022-04-16 LAB
25(OH)D3 SERPL-MCNC: 34 NG/ML (ref 30–100)
ALBUMIN SERPL-MCNC: 3.8 G/DL (ref 3.5–5)
ALBUMIN/GLOB SERPL: 1.1 {RATIO} (ref 1.1–2.2)
ALP SERPL-CCNC: 241 U/L (ref 45–117)
ALT SERPL-CCNC: 98 U/L (ref 12–78)
ANION GAP SERPL CALC-SCNC: 11 MMOL/L (ref 5–15)
AST SERPL-CCNC: 68 U/L (ref 15–37)
BASOPHILS # BLD: 0.1 K/UL (ref 0–0.1)
BASOPHILS NFR BLD: 1 % (ref 0–1)
BILIRUB SERPL-MCNC: 0.9 MG/DL (ref 0.2–1)
BUN SERPL-MCNC: 28 MG/DL (ref 6–20)
BUN/CREAT SERPL: 30 (ref 12–20)
CALCIUM SERPL-MCNC: 10 MG/DL (ref 8.5–10.1)
CHLORIDE SERPL-SCNC: 99 MMOL/L (ref 97–108)
CHOLEST SERPL-MCNC: 212 MG/DL
CK SERPL-CCNC: 90 U/L (ref 26–192)
CO2 SERPL-SCNC: 28 MMOL/L (ref 21–32)
CREAT SERPL-MCNC: 0.92 MG/DL (ref 0.55–1.02)
DIFFERENTIAL METHOD BLD: ABNORMAL
EOSINOPHIL # BLD: 0.1 K/UL (ref 0–0.4)
EOSINOPHIL NFR BLD: 1 % (ref 0–7)
ERYTHROCYTE [DISTWIDTH] IN BLOOD BY AUTOMATED COUNT: 15.3 % (ref 11.5–14.5)
EST. AVERAGE GLUCOSE BLD GHB EST-MCNC: 272 MG/DL
GLOBULIN SER CALC-MCNC: 3.4 G/DL (ref 2–4)
GLUCOSE SERPL-MCNC: 158 MG/DL (ref 65–100)
HBA1C MFR BLD: 11.1 % (ref 4–5.6)
HCT VFR BLD AUTO: 48.8 % (ref 35–47)
HDLC SERPL-MCNC: 52 MG/DL
HDLC SERPL: 4.1 {RATIO} (ref 0–5)
HGB BLD-MCNC: 16.4 G/DL (ref 11.5–16)
IMM GRANULOCYTES # BLD AUTO: 0.1 K/UL (ref 0–0.04)
IMM GRANULOCYTES NFR BLD AUTO: 1 % (ref 0–0.5)
LDLC SERPL CALC-MCNC: 97.2 MG/DL (ref 0–100)
LYMPHOCYTES # BLD: 1.9 K/UL (ref 0.8–3.5)
LYMPHOCYTES NFR BLD: 19 % (ref 12–49)
MCH RBC QN AUTO: 33.1 PG (ref 26–34)
MCHC RBC AUTO-ENTMCNC: 33.6 G/DL (ref 30–36.5)
MCV RBC AUTO: 98.6 FL (ref 80–99)
MONOCYTES # BLD: 0.7 K/UL (ref 0–1)
MONOCYTES NFR BLD: 7 % (ref 5–13)
NEUTS SEG # BLD: 7.3 K/UL (ref 1.8–8)
NEUTS SEG NFR BLD: 71 % (ref 32–75)
NRBC # BLD: 0 K/UL (ref 0–0.01)
NRBC BLD-RTO: 0 PER 100 WBC
PLATELET # BLD AUTO: 312 K/UL (ref 150–400)
PMV BLD AUTO: 10.3 FL (ref 8.9–12.9)
POTASSIUM SERPL-SCNC: 3.3 MMOL/L (ref 3.5–5.1)
PROT SERPL-MCNC: 7.2 G/DL (ref 6.4–8.2)
RBC # BLD AUTO: 4.95 M/UL (ref 3.8–5.2)
SODIUM SERPL-SCNC: 138 MMOL/L (ref 136–145)
T4 SERPL-MCNC: 8.1 UG/DL (ref 4.8–13.9)
TRIGL SERPL-MCNC: 314 MG/DL (ref ?–150)
TSH SERPL DL<=0.05 MIU/L-ACNC: 2.45 UIU/ML (ref 0.36–3.74)
VLDLC SERPL CALC-MCNC: 62.8 MG/DL
WBC # BLD AUTO: 10.2 K/UL (ref 3.6–11)

## 2022-04-18 LAB
BACTERIA SPEC CULT: ABNORMAL
CC UR VC: ABNORMAL
SERVICE CMNT-IMP: ABNORMAL

## 2022-04-18 RX ORDER — POTASSIUM CHLORIDE 20 MEQ/1
20 TABLET, EXTENDED RELEASE ORAL 2 TIMES DAILY
Qty: 180 TABLET | Refills: 3 | Status: SHIPPED | OUTPATIENT
Start: 2022-04-18 | End: 2022-07-26 | Stop reason: SDUPTHER

## 2022-04-18 RX ORDER — BENZONATATE 200 MG/1
CAPSULE ORAL
Qty: 60 CAPSULE | Refills: 1 | Status: SHIPPED | OUTPATIENT
Start: 2022-04-18

## 2022-04-18 RX ORDER — CIPROFLOXACIN 250 MG/1
250 TABLET, FILM COATED ORAL 2 TIMES DAILY
Qty: 14 TABLET | Refills: 0 | Status: SHIPPED | OUTPATIENT
Start: 2022-04-18 | End: 2022-04-25

## 2022-04-18 NOTE — TELEPHONE ENCOUNTER
RX refill request from the patient/pharmacy. Patient last seen 04- with labs, and needs a f/up ua and culture after treatment. Requested Prescriptions     Pending Prescriptions Disp Refills    ciprofloxacin HCl (CIPRO) 250 mg tablet 14 Tablet 0     Sig: Take 1 Tablet by mouth two (2) times a day for 7 days.  benzonatate (TESSALON) 200 mg capsule 60 Capsule 1     Sig: TAKE 1 CAPSULE BY MOUTH THREE TIMES DAILY AS NEEDED FOR COUGH - SWALLOW CAPSULE WHOLE (DO NOT CRUSH)    potassium chloride (K-DUR, KLOR-CON M20) 20 mEq tablet 180 Tablet 3     Sig: Take 1 Tablet by mouth two (2) times a day.

## 2022-04-18 NOTE — PROGRESS NOTES
Has UTI so Cipro 250 BID. Extremely poorly controlled DM. She has an appt with Dr. Clark People coming up soon. I will let him adjust meds for DM.  Other labs OK except those related to BS and low K so increase Potassium to 1 tab BID

## 2022-04-18 NOTE — PROGRESS NOTES
Has UTI so Cipro 250 BID. Extremely poorly controlled DM. She has an appt with Dr. Jadon Kessler coming up soon. I will let him adjust meds for DM. Other labs OK except those related to BS and low K so increase Potassium to 1 tab BID. Discussed with patient. Rx's sent to patient's pharmacy.

## 2022-05-03 ENCOUNTER — OFFICE VISIT (OUTPATIENT)
Dept: ENDOCRINOLOGY | Age: 77
End: 2022-05-03
Payer: MEDICARE

## 2022-05-03 VITALS
HEART RATE: 84 BPM | DIASTOLIC BLOOD PRESSURE: 84 MMHG | HEIGHT: 63 IN | WEIGHT: 199.6 LBS | BODY MASS INDEX: 35.37 KG/M2 | SYSTOLIC BLOOD PRESSURE: 124 MMHG

## 2022-05-03 DIAGNOSIS — I10 ESSENTIAL HYPERTENSION, BENIGN: ICD-10-CM

## 2022-05-03 DIAGNOSIS — R80.9 TYPE 2 DIABETES MELLITUS WITH MICROALBUMINURIA, WITH LONG-TERM CURRENT USE OF INSULIN (HCC): Primary | ICD-10-CM

## 2022-05-03 DIAGNOSIS — Z79.4 TYPE 2 DIABETES MELLITUS WITH MICROALBUMINURIA, WITH LONG-TERM CURRENT USE OF INSULIN (HCC): Primary | ICD-10-CM

## 2022-05-03 DIAGNOSIS — E11.29 TYPE 2 DIABETES MELLITUS WITH MICROALBUMINURIA, WITH LONG-TERM CURRENT USE OF INSULIN (HCC): Primary | ICD-10-CM

## 2022-05-03 DIAGNOSIS — E78.5 HYPERLIPIDEMIA LDL GOAL <100: ICD-10-CM

## 2022-05-03 PROCEDURE — 3046F HEMOGLOBIN A1C LEVEL >9.0%: CPT | Performed by: INTERNAL MEDICINE

## 2022-05-03 PROCEDURE — 99204 OFFICE O/P NEW MOD 45 MIN: CPT | Performed by: INTERNAL MEDICINE

## 2022-05-03 PROCEDURE — 3017F COLORECTAL CA SCREEN DOC REV: CPT | Performed by: INTERNAL MEDICINE

## 2022-05-03 PROCEDURE — G8752 SYS BP LESS 140: HCPCS | Performed by: INTERNAL MEDICINE

## 2022-05-03 PROCEDURE — G8536 NO DOC ELDER MAL SCRN: HCPCS | Performed by: INTERNAL MEDICINE

## 2022-05-03 PROCEDURE — G8427 DOCREV CUR MEDS BY ELIG CLIN: HCPCS | Performed by: INTERNAL MEDICINE

## 2022-05-03 PROCEDURE — 1101F PT FALLS ASSESS-DOCD LE1/YR: CPT | Performed by: INTERNAL MEDICINE

## 2022-05-03 PROCEDURE — G8417 CALC BMI ABV UP PARAM F/U: HCPCS | Performed by: INTERNAL MEDICINE

## 2022-05-03 PROCEDURE — G8399 PT W/DXA RESULTS DOCUMENT: HCPCS | Performed by: INTERNAL MEDICINE

## 2022-05-03 PROCEDURE — 1090F PRES/ABSN URINE INCON ASSESS: CPT | Performed by: INTERNAL MEDICINE

## 2022-05-03 PROCEDURE — G8754 DIAS BP LESS 90: HCPCS | Performed by: INTERNAL MEDICINE

## 2022-05-03 PROCEDURE — 2022F DILAT RTA XM EVC RTNOPTHY: CPT | Performed by: INTERNAL MEDICINE

## 2022-05-03 PROCEDURE — G9717 DOC PT DX DEP/BP F/U NT REQ: HCPCS | Performed by: INTERNAL MEDICINE

## 2022-05-03 RX ORDER — NINTEDANIB 150 MG/1
CAPSULE ORAL 2 TIMES DAILY
COMMUNITY
Start: 2022-03-16

## 2022-05-03 RX ORDER — PREDNISONE 10 MG/1
TABLET ORAL
Qty: 360 TABLET | Refills: 3
Start: 2022-05-03

## 2022-05-03 RX ORDER — INSULIN GLARGINE 100 [IU]/ML
INJECTION, SOLUTION SUBCUTANEOUS
Qty: 10 PEN | Refills: 3
Start: 2022-05-03 | End: 2022-05-13

## 2022-05-03 NOTE — PATIENT INSTRUCTIONS
1) Increase the lantus to 40 units in the morning and at night. 2) Increase the NPH (cloudy) to 30 units in the morning at the same time as the prednisone. 3) Give this 4 days and if by Saturday morning, 5/7/22, your fasting sugar before breakfast is still over 150, then increase the lantus to 45 units in the morning and at night and give me an update with your readings next Monday. 4) Your blood pressure is controlled and your cholesterol was above goal but this usually improves with better diabetes control. 5) Let me know when you need some refills on your insulin.

## 2022-05-03 NOTE — PROGRESS NOTES
Chief Complaint   Patient presents with    Diabetes     pcp and pharmacy confirmed     History of Present Illness: Dulce Yanez is a 78 y.o. female who is a new patient for evaluation of diabetes. Was diagnosed with diabetes over 10 years ago and was initially on metformin but this caused a lot of diarrhea. Then was on amaryl and [place] and lantus was added in 2/20. She has been followed by Dr. Martita Pennington for pulmonary fibrosis and was diagnosed in 3/18. Has been managed with prednisone and in 3/22 her blood sugar got up over 500 and had an ER visit and Dr. Milton Mijares, the ER doctor, recommended she see me. Dr. Krystal Vanessa reached out to me and I recommend starting NPH to cover her prednisone and stop the amaryl and [place] and he did this. Initially she was taking 40 mg of prednisone and took 36 units of NPH to cover this dose. She decreased her prednisone to 30 mg daily and then 20 mg and finally 10 mg daily and decreased her NPH along the way but her breathing and quality of life worsened as she went down on the dose so has since gone back up to 30 mg of prednisone and has increased her NPH back to 25 units at the same as the prednisone in addition to her baseline dose of lantus 35 units bid. Checks blood sugars 4-5 times per day and readings can be down around 100 if she took a dose of NPH 10-15 units the night before when her sugars were closer to 400. Sugars tend to rise to the 200-300s as the day goes on despite taking the NPH. Most recent Hgb A1c was 11.1% in 4/22 and her values have been over 9% the past 2 years.  A typical day is as follows:  - breakfast: cheerios and 2 mandarin oranges or 1-2 bananas  - lunch: meat and cheese roll up with a salad and breaded chicken, no chips or fruit, may have in a soft taco shell  - dinner: health choice or smart ones frozen meal and will add bbq or sweet and sour sauce and cheese, has some popsicles, has given up nutty bars, some low fat jello with low fat whipped cream  - beverages: water, diet decaf iced tea, no sugar lemonade  - snacks: some popcorn at bedtime  Exercise is limited to some bed exercises and mild walking around the house. No history of vascular disease. No history of retinopathy, (+) neuropathy in her left leg from prior back surgeries, (+) mild nephropathy. Last eye exam was 10/21. Past Medical History:   Diagnosis Date    Abnormal LFTs 08/24/2017    FATTY LIVER    Arthritis     OSTEO    Avascular necrosis of hip ([address] 08/24/2017    BILAT HIPS    Breast CA ([address] 1989, 2001    BILATERAL; SURGERY, CHEMO    Chronic pain     CKD (chronic kidney disease), stage II 8/24/2017    Coagulation disorder ([facility] 1984    ITP  (DR CHU BRADSHAW) - PLATELETS DROPPED TO 5K    Depression     Diabetes ([facility] 2012    TYPE 2; NIDDM    DJD (degenerative joint disease), multiple sites 8/24/2017    GI bleed 2008    HEMORRHOIDS    Hyperlipemia     Hypertension with renal disease 8/24/2017    IBS (irritable bowel syndrome) 8/24/2017    Ill-defined condition 2015    PNEUMONIA X2 - HOSPITALIZED IN 2015    Interstitial lung disease ([facility] 04/01/2019    Liver disease     FATTY LIVER    Myalgia 8/24/2017    On statin therapy 8/24/2017    Overactive bladder 8/24/2017    Pneumonia 04/2015    HOSPITALIZED 3 WEEKS.     Polymyalgia rheumatica ([address] 8/24/2017    Prophylactic antibiotic 8/24/2017    Reflex abnormality     acid reflex    Rosacea      Past Surgical History:   Procedure Laterality Date    HX APPENDECTOMY  1950    HX BREAST BIOPSY Bilateral     HX CATARACT REMOVAL Bilateral     HX COLONOSCOPY      HX ENDOSCOPY      HX GI      COLONOSCOPY    HX HEENT      WISDOM TEETH    HX HYSTERECTOMY  2000S    HX MASTECTOMY Right 1989    HX MASTECTOMY Left 2001    HX ORTHOPAEDIC  2008    LUMBAR FUSION    HX ORTHOPAEDIC  2018    RE-DO LUMBAR FUSION, AND ADDED THORACIC FUSION    HX ORTHOPAEDIC  03/26/2019    neckectomy    HX TUBAL LIGATION  1981    HX UROLOGICAL 2000s    BLADDER SLING    ME BREAST SURGERY PROCEDURE UNLISTED  1993, 2002    RECONSTRUCTION X2    ME TOTAL HIP ARTHROPLASTY Left 11/16/2016    ANTERIOR APPROACH, DR Gwendolyn De La Torre; (POSTOP: STANDS ONE INCH TALLER ON LEFT FOOT)    ME TOTAL HIP ARTHROPLASTY Right 12/2016    ANTERIOR APPROACH (DR Gwendolyn De La Torre)     Current Outpatient Medications   Medication Sig    Ofev 150 mg cap two (2) times a day.  insulin NPH (HUMULIN N) 100 unit/mL (3 mL) inpn 25 U daily with your prednisone dose--Dose change 05/03/22--updated med list--did not send prescription to the pharmacy    predniSONE (DELTASONE) 10 mg tablet Take 3 tablets daily-Dose change 05/03/22--updated med list--did not send prescription to the pharmacy    benzonatate (TESSALON) 200 mg capsule TAKE 1 CAPSULE BY MOUTH THREE TIMES DAILY AS NEEDED FOR COUGH - SWALLOW CAPSULE WHOLE (DO NOT CRUSH)    potassium chloride (K-DUR, KLOR-CON M20) 20 mEq tablet Take 1 Tablet by mouth two (2) times a day.  hydroCHLOROthiazide (HYDRODIURIL) 25 mg tablet TAKE 1 TABLET DAILY FOR FLUID AND BLOOD PRESSURE    insulin glargine (Lantus Solostar U-100 Insulin) 100 unit/mL (3 mL) inpn INJECT 35 UNITS BID    clonazePAM (KlonoPIN) 1 mg tablet Take 1 Tablet by mouth two (2) times a day. Max Daily Amount: 2 mg.  albuterol (ProAir HFA) 90 mcg/actuation inhaler Take 1 Puff by inhalation every four (4) hours as needed for Wheezing or Shortness of Breath.  rosuvastatin (CRESTOR) 20 mg tablet Take 1 Tablet by mouth nightly.  buPROPion XL (WELLBUTRIN XL) 150 mg tablet TAKE 1 TABLET DAILY    sertraline (ZOLOFT) 100 mg tablet TAKE 1 TABLET DAILY    carvediloL (COREG) 6.25 mg tablet TAKE 1 TABLET TWICE A DAY    BD Ultra-Fine Short Pen Needle 31 gauge x 5/16\" ndle USE UNDER THE SKIN DAILY. USE WITH LANTUS FLEX PEN DAILY    fenofibrate nanocrystallized (TRICOR) 145 mg tablet Take 1 Tablet by mouth daily.     glucose blood VI test strips (True Metrix Glucose Test Strip) strip USE ONCE DAILY AND RECORD RESULTS IN A NOTEBOOK ALSO RECORD LAST MEALTIME IN NOTEBOOK    clemastine 2.68 mg tab tablet Take 2.68 mg by mouth two (2) times a day.  clindamycin (CLEOCIN) 300 mg capsule Take 2 capsules 1 hour before dental procedure    pantoprazole (PROTONIX) 40 mg tablet Take 40 mg by mouth daily.  montelukast sodium (SINGULAIR PO) Take 10 mg by mouth nightly. 10 mg tab     No current facility-administered medications for this visit. Allergies   Allergen Reactions    Metformin Diarrhea    Statins-Hmg-Coa Reductase Inhibitors Myalgia     Myalgia with  multiple statins  Takes pravachol     Codeine Rash     Rash on thighs    Darvon [Propoxyphene] Other (comments)     \"I see worms\"    Pcn [Penicillins] Rash    Sulfa (Sulfonamide Antibiotics) Rash     Family History   Problem Relation Age of Onset    Heart Disease Mother     Kidney Disease Mother     Lung Disease Mother         COPD    Diabetes Brother     Pacemaker Brother     Kidney Disease Brother     Hypertension Brother     Anesth Problems Neg Hx      Social History     Socioeconomic History    Marital status:      Spouse name: Not on file    Number of children: Not on file    Years of education: Not on file    Highest education level: Not on file   Occupational History    Not on file   Tobacco Use    Smoking status: Never Smoker    Smokeless tobacco: Never Used   Vaping Use    Vaping Use: Never used   Substance and Sexual Activity    Alcohol use: No    Drug use: No    Sexual activity: Never   Other Topics Concern    Not on file   Social History Narrative    Lives in [place] alone. Has one son in [place] and one daughter in [facility] [place]. . Used to work as an  for [employer] of education.        Social Determinants of Health     Financial Resource Strain:     Difficulty of Paying Living Expenses: Not on file   Food Insecurity:     Worried About Running Out of Food in the Last Year: Not on file    [address] in the Last Year: Not on file   Transportation Needs:     Lack of Transportation (Medical): Not on file    Lack of Transportation (Non-Medical): Not on file   Physical Activity:     Days of Exercise per Week: Not on file    Minutes of Exercise per Session: Not on file   Stress:     Feeling of Stress : Not on file   Social Connections:     Frequency of Communication with Friends and Family: Not on file    Frequency of Social Gatherings with Friends and Family: Not on file    Attends Scientologist Services: Not on file    Active Member of [address] [place] [address] or Organizations: Not on file    Attends Club or Organization Meetings: Not on file    Marital Status: Not on file   Intimate Partner Violence:     Fear of Current or Ex-Partner: Not on file    Emotionally Abused: Not on file    Physically Abused: Not on file    Sexually Abused: Not on file   Housing Stability:     Unable to Pay for Housing in the Last Year: Not on file    Number of [place] in the Last Year: Not on file    Unstable Housing in the Last Year: Not on file     Review of Systems: per HPI    Physical Examination:  Blood pressure 124/84, pulse 84, height 5' 3\" (1.6 m), weight 199 lb 9.6 oz (90.5 kg).   - General: pleasant, no distress, good eye contact, (+) cushingoid facies  - HEENT: no exopthalmos, no periorbital edema, no scleral/conjunctival injection, EOMI,   - Neck: supple, no thyromegaly, masses, lymph nodes, or carotid bruits,   - Cardiovascular: regular, normal rate, normal S1 and S2, no murmurs/rubs/gallops,  - Respiratory: clear to auscultation bilaterally  - Gastrointestinal: soft, nontender, nondistended, no masses, no hepatosplenomegaly  - Musculoskeletal: (+) proximal muscle weakness in upper and lower extremities  - Integumentary: no acanthosis nigricans, no rashes, no edema, no foot ulcers  - Neurological: see foot exam  - Psychiatric: normal mood and affect    Diabetic foot exam:     Left Foot:   Visual Exam: normal    Pulse DP: 2+ (normal)   Filament test: absent sensation    Vibratory sensation: Vibratory sensation: absent       Right Foot:   Visual Exam: normal    Pulse DP: 2+ (normal)   Filament test: reduced sensation    Vibratory sensation: Vibratory sensation: diminished            Data Reviewed:   Component      Latest Ref Rng & Units 4/15/2022   Sodium      136 - 145 mmol/L 138   Potassium      3.5 - 5.1 mmol/L 3.3 (L)   Chloride      97 - 108 mmol/L 99   CO2      21 - 32 mmol/L 28   Anion gap      5 - 15 mmol/L 11   Glucose      65 - 100 mg/dL 158 (H)   BUN      6 - 20 MG/DL 28 (H)   Creatinine      0.55 - 1.02 MG/DL 0.92   BUN/Creatinine ratio      12 - 20   30 (H)   GFR est AA      >60 ml/min/1.73m2 >60   GFR est non-AA      >60 ml/min/1.73m2 60 (L)   Calcium      8.5 - 10.1 MG/DL 10.0   Bilirubin, total      0.2 - 1.0 MG/DL 0.9   ALT      12 - 78 U/L 98 (H)   AST      15 - 37 U/L 68 (H)   Alk. phosphatase      45 - 117 U/L 241 (H)   Protein, total      6.4 - 8.2 g/dL 7.2   Albumin      3.5 - 5.0 g/dL 3.8   Globulin      2.0 - 4.0 g/dL 3.4   A-G Ratio      1.1 - 2.2   1.1   Cholesterol, total      <200 MG/ (H)   Triglyceride      <150 MG/ (H)   HDL Cholesterol      MG/DL 52   LDL, calculated      0 - 100 MG/DL 97.2   VLDL, calculated      MG/DL 62.8   CHOL/HDL Ratio      0.0 - 5.0   4.1   Microalbumin,urine random      MG/DL 30.50   Creatinine, urine      mg/dL 368.00   Microalbumin/Creat. Ratio      0 - 30 mg/g 83 (H)   Hemoglobin A1c, (calculated)      4.0 - 5.6 % 11.1 (H)   Est. average glucose      mg/dL 272   Vitamin D 25-Hydroxy      30 - 100 ng/mL 34.0   TSH      0.36 - 3.74 uIU/mL 2.45   T4, Total      4.8 - 13.9 ug/dL 8.1   CK      26 - 192 U/L 90       Assessment/Plan:   1. Type 2 diabetes mellitus with microalbuminuria, with long-term current use of [facility] ([facility]): her most recent Hgb A1c was 11.1% in 4/22 and all values have been over 9% since 2020.   Her lung disease is being managed with chronic high dose prednisone so needs more NPH and lantus to better control her sugars. - increase lantus to 40 units bid  - increase NPH to 30 units at the same time as 30 mg of prednisone  - check bs 4 times per day due to fluctuating sugars  - foot exam done 4/22  - microalbumin 83 in 4/22  - optho UTD 10/21  - check A1c in 7/22 with PCP        2. Essential hypertension, benign: her BP was at goal < 140/90  - cont current regimen        3. Hyperlipidemia LDL goal <100: LDL 97 and  in 4/22 on crestor 20 mg daily. TGs should improve with lower A1c  - cont crestor 20 mg daily   - check lipids in 7/22 with PCP     Patient Instructions   1) Increase the lantus to 40 units in the morning and at night. 2) Increase the NPH (cloudy) to 30 units in the morning at the same time as the prednisone. 3) Give this 4 days and if by Saturday morning, 5/7/22, your fasting sugar before breakfast is still over 150, then increase the lantus to 45 units in the morning and at night and give me an update with your readings next Monday. 4) Your blood pressure is controlled and your cholesterol was above goal but this usually improves with better diabetes control. 5) Let me know when you need some refills on your insulin.         Follow-up and Dispositions    · Return 8/2/22 at 8:50am.             Copy sent to:  Jayla Pardo MD as PCP - General (Internal Medicine)  Lona Bustillo MD (Pulmonary Disease)

## 2022-05-09 ENCOUNTER — TELEPHONE (OUTPATIENT)
Dept: ENDOCRINOLOGY | Age: 77
End: 2022-05-09

## 2022-05-09 NOTE — TELEPHONE ENCOUNTER
5/9/2022  1:44 PM    Patient called and left message on vm at 1.20 to let dr. Valente Eagle know of her fasting numbers   05/05/22--125  05/06/22--283  05/07/22--233  05/08/22--200  05/09/22--97    She said that you can call her back if needed at [phone]

## 2022-05-10 NOTE — TELEPHONE ENCOUNTER
Sent her the following message through [employer]:    I received your message that you called about your blood sugars and your fasting sugar was down to 97 this morning. Have you adjusted your lantus any more or are you still taking 40 units twice daily and NPH 30 units in the morning? Let me know when you have a chance.

## 2022-05-11 ENCOUNTER — TELEPHONE (OUTPATIENT)
Dept: INTERNAL MEDICINE CLINIC | Age: 77
End: 2022-05-11

## 2022-05-11 NOTE — TELEPHONE ENCOUNTER
[address] [place] [address] she has to put [facility] on hold for two weeks due to she,Cheryl, broke her foot.

## 2022-05-13 RX ORDER — INSULIN GLARGINE 100 [IU]/ML
INJECTION, SOLUTION SUBCUTANEOUS
Qty: 10 PEN | Refills: 3
Start: 2022-05-13 | End: 2022-05-31

## 2022-05-14 PROBLEM — Z00.00 MEDICARE ANNUAL WELLNESS VISIT, SUBSEQUENT: Status: RESOLVED | Noted: 2020-02-12 | Resolved: 2022-05-14

## 2022-05-15 PROBLEM — J96.11 CHRONIC HYPOXEMIC RESPIRATORY FAILURE (HCC): Status: ACTIVE | Noted: 2022-05-15

## 2022-05-16 ENCOUNTER — OFFICE VISIT (OUTPATIENT)
Dept: INTERNAL MEDICINE CLINIC | Age: 77
End: 2022-05-16
Payer: MEDICARE

## 2022-05-16 VITALS
BODY MASS INDEX: 36.13 KG/M2 | SYSTOLIC BLOOD PRESSURE: 138 MMHG | DIASTOLIC BLOOD PRESSURE: 82 MMHG | WEIGHT: 203.9 LBS | HEART RATE: 88 BPM | RESPIRATION RATE: 20 BRPM | TEMPERATURE: 99.7 F | OXYGEN SATURATION: 94 % | HEIGHT: 63 IN

## 2022-05-16 DIAGNOSIS — J96.11 CHRONIC HYPOXEMIC RESPIRATORY FAILURE (HCC): ICD-10-CM

## 2022-05-16 DIAGNOSIS — R25.1 TREMOR: ICD-10-CM

## 2022-05-16 DIAGNOSIS — J84.9 INTERSTITIAL LUNG DISEASE (HCC): Primary | ICD-10-CM

## 2022-05-16 PROCEDURE — 1101F PT FALLS ASSESS-DOCD LE1/YR: CPT | Performed by: INTERNAL MEDICINE

## 2022-05-16 PROCEDURE — G8427 DOCREV CUR MEDS BY ELIG CLIN: HCPCS | Performed by: INTERNAL MEDICINE

## 2022-05-16 PROCEDURE — 99213 OFFICE O/P EST LOW 20 MIN: CPT | Performed by: INTERNAL MEDICINE

## 2022-05-16 PROCEDURE — G8754 DIAS BP LESS 90: HCPCS | Performed by: INTERNAL MEDICINE

## 2022-05-16 PROCEDURE — G8417 CALC BMI ABV UP PARAM F/U: HCPCS | Performed by: INTERNAL MEDICINE

## 2022-05-16 PROCEDURE — 1090F PRES/ABSN URINE INCON ASSESS: CPT | Performed by: INTERNAL MEDICINE

## 2022-05-16 PROCEDURE — G9717 DOC PT DX DEP/BP F/U NT REQ: HCPCS | Performed by: INTERNAL MEDICINE

## 2022-05-16 PROCEDURE — G8752 SYS BP LESS 140: HCPCS | Performed by: INTERNAL MEDICINE

## 2022-05-16 PROCEDURE — G8399 PT W/DXA RESULTS DOCUMENT: HCPCS | Performed by: INTERNAL MEDICINE

## 2022-05-16 PROCEDURE — 3017F COLORECTAL CA SCREEN DOC REV: CPT | Performed by: INTERNAL MEDICINE

## 2022-05-16 PROCEDURE — G8536 NO DOC ELDER MAL SCRN: HCPCS | Performed by: INTERNAL MEDICINE

## 2022-05-16 RX ORDER — PRIMIDONE 50 MG/1
25 TABLET ORAL 2 TIMES DAILY
Qty: 60 TABLET | Refills: 3 | Status: SHIPPED | OUTPATIENT
Start: 2022-05-16 | End: 2022-05-24 | Stop reason: SDUPTHER

## 2022-05-16 NOTE — PATIENT INSTRUCTIONS
Noninsulin Medicines for Type 2 Diabetes: Care Instructions  Overview     There are different types of noninsulin medicines for diabetes. Each works in a different way. But they all help you control your blood sugar. Some types help your body make insulin to lower your blood sugar. Others lower how much insulin your body needs. Some can slow how fast your body digests sugars. And some can remove extra glucose through your urine. You may need to take more than one medicine for diabetes. Two or more medicines may work better to lower your blood sugar level than just one does. · Metformin. This lowers how much glucose your liver makes. And it helps you respond better to insulin. It also lowers the amount of stored sugar that your liver releases when you are not eating. · Sulfonylureas. These help your body release more insulin. Some work for many hours. They can cause low blood sugar if you don't eat as you planned. An example is glipizide. · Thiazolidinediones. These reduce the amount of blood glucose. They also help you respond better to insulin. An example is pioglitazone. · SGLT2 inhibitors. These help to remove extra glucose through your urine. They may also help some people lose weight. An example is ertugliflozin. · DPP-4 inhibitors. These help your body raise the level of insulin after you eat. They also help your body make less of a hormone that raises blood sugar. An example is alogliptin. · GLP-1 receptor agonists. These help your body make a protein that can raise your insulin level and make you less hungry. They're given as shots or pills. An example is semaglutide. · Meglitinides. These help your body release insulin. They also help slow how your body digests sugars. So they can keep your blood sugar from rising too fast after you eat. · Alpha-glucosidase inhibitors. These keep starches from breaking down. This means that they lower the amount of glucose absorbed when you eat.  They don't help your body make more insulin. So they will not cause low blood sugar unless you use them with other medicines for diabetes. Follow-up care is a key part of your treatment and safety. Be sure to make and go to all appointments, and call your doctor if you are having problems. It's also a good idea to know your test results and keep a list of the medicines you take. How can you care for yourself at home? · Eat a healthy diet. Get some exercise each day. This may help you to reduce how much medicine you need. · Do not take other prescription or over-the-counter medicines, vitamins, herbal products, or supplements without talking to your doctor first. Some medicines for type 2 diabetes can cause problems with other medicines or supplements. · Tell your doctor if you plan to get pregnant. Some of these drugs are not safe for pregnant women. · Be safe with medicines. Take your medicines exactly as prescribed. Meglitinides and sulfonylureas can cause your blood sugar to drop very low. Call your doctor if you think you are having a problem with your medicine. · Check your blood sugar often. You can use a glucose monitor. Keeping track can help you know how certain foods, activities, and medicines affect your blood sugar. And it can help you keep your blood sugar from getting so low that it's not safe. When should you call for help? Call 911 anytime you think you may need emergency care. For example, call if:    · You passed out (lost consciousness).     · You are confused or cannot think clearly.     · Your blood sugar is very high or very low. Watch closely for changes in your health, and be sure to contact your doctor if:    · Your blood sugar stays outside the level your doctor set for you.     · You have any problems. Where can you learn more?   Go to http://www.gray.com/  Enter H153 in the search box to learn more about \"Noninsulin Medicines for Type 2 Diabetes: Care Instructions. \"  Current as of: July 28, 2021               Content Version: 13.2  © 9643-4299 [employer], [facility]. Care instructions adapted under license by [employer] (which disclaims liability or warranty for this information). If you have questions about a medical condition or this instruction, always ask your healthcare professional. [place] [address] any warranty or liability for your use of this information.

## 2022-05-16 NOTE — PROGRESS NOTES
Chief Complaint   Patient presents with    Diabetes     1 month f/up    Hypertension    Chronic Kidney Disease     1. Have you been to the ER, urgent care clinic since your last visit? Hospitalized since your last visit? Sees Dr. Kassie Macedo virtually every week. 2. Have you seen or consulted any other health care providers outside of the [address] [place] [address] since your last visit? Include any pap smears or colon screening.  No

## 2022-05-16 NOTE — PROGRESS NOTES
Chief Complaint   Patient presents with    Diabetes     1 month f/up    Hypertension    Chronic Kidney Disease    Fall     x 2 this week       SUBJECTIVE:    Clem Abarca is a 78 y.o. female in follow-up regarding her interstitial lung disease, chronic hypoxic respiratory failure, diabetes and other medical problems. She notes her blood sugars for the most part of been running okay although this morning was 70 sues did not take her insulin. Her main complaint now seems to be that her tremor has become to the point where she is having a hard time doing things. She actually cut the grass yesterday on a riding lawn more and did well with that. She did lose her balance and fall twice once in the [place] and other time when she slipped on some spaghetti did fall in her kitchen floor. She did not injure her self from either those. She denies any chest pain or increase of her chronic dyspnea if anything that is better. She denies any GI or  complaints. She has no other complaints neurologically other than the tremor and generalized weakness. Current Outpatient Medications   Medication Sig Dispense Refill    primidone (Mysoline) 50 mg tablet Take 0.5 Tablets by mouth two (2) times a day. 60 Tablet 3    insulin NPH (HUMULIN N) 100 unit/mL (3 mL) inpn 40 U daily while on 30 mg of prednisone dose--Dose change 05/13/22--updated med list--did not send prescription to the pharmacy (Patient taking differently: 30 U daily while on 30 mg of prednisone dose--Dose change 05/13/22--updated med list--did not send prescription to the pharmacy) 3 Pen 5    Ofev 150 mg cap two (2) times a day.       predniSONE (DELTASONE) 10 mg tablet Take 3 tablets daily-Dose change 05/03/22--updated med list--did not send prescription to the pharmacy 360 Tablet 3    benzonatate (TESSALON) 200 mg capsule TAKE 1 CAPSULE BY MOUTH THREE TIMES DAILY AS NEEDED FOR COUGH - SWALLOW CAPSULE WHOLE (DO NOT CRUSH) 60 Capsule 1    potassium chloride (K-DUR, KLOR-CON M20) 20 mEq tablet Take 1 Tablet by mouth two (2) times a day. 180 Tablet 3    hydroCHLOROthiazide (HYDRODIURIL) 25 mg tablet TAKE 1 TABLET DAILY FOR FLUID AND BLOOD PRESSURE 90 Tablet 3    clonazePAM (KlonoPIN) 1 mg tablet Take 1 Tablet by mouth two (2) times a day. Max Daily Amount: 2 mg. 60 Tablet 0    albuterol (ProAir HFA) 90 mcg/actuation inhaler Take 1 Puff by inhalation every four (4) hours as needed for Wheezing or Shortness of Breath. 18 g 5    rosuvastatin (CRESTOR) 20 mg tablet Take 1 Tablet by mouth nightly. 90 Tablet 3    buPROPion XL (WELLBUTRIN XL) 150 mg tablet TAKE 1 TABLET DAILY 90 Tablet 3    sertraline (ZOLOFT) 100 mg tablet TAKE 1 TABLET DAILY 90 Tablet 3    carvediloL (COREG) 6.25 mg tablet TAKE 1 TABLET TWICE A  Tablet 3    BD Ultra-Fine Short Pen Needle 31 gauge x 5/16\" ndle USE UNDER THE SKIN DAILY. USE WITH LANTUS FLEX PEN DAILY 1 Package 3    fenofibrate nanocrystallized (TRICOR) 145 mg tablet Take 1 Tablet by mouth daily. 90 Tablet 3    glucose blood VI test strips (True Metrix Glucose Test Strip) strip USE ONCE DAILY AND RECORD RESULTS IN A NOTEBOOK ALSO RECORD LAST MEALTIME IN NOTEBOOK 100 Strip 3    clemastine 2.68 mg tab tablet Take 2.68 mg by mouth two (2) times a day.  clindamycin (CLEOCIN) 300 mg capsule Take 2 capsules 1 hour before dental procedure 10 Cap 0    pantoprazole (PROTONIX) 40 mg tablet Take 40 mg by mouth daily.  montelukast sodium (SINGULAIR PO) Take 10 mg by mouth nightly.  10 mg tab      insulin glargine (Lantus Solostar U-100 Insulin) 100 unit/mL (3 mL) inpn INJECT 45 UNITS BID--Dose change 05/13/22--updated med list--did not send prescription to the pharmacy 10 Pen 3     Past Medical History:   Diagnosis Date    Abnormal LFTs 08/24/2017    FATTY LIVER    Arthritis     OSTEO    Avascular necrosis of hip ([facility] [address] 08/24/2017    BILAT HIPS    Breast CA ([facility] [address] 1989, 2001    BILATERAL; SURGERY, CHEMO    Chronic pain     CKD (chronic kidney disease), stage II 8/24/2017    Coagulation disorder ([facility] [address] 1984    ITP  (DR CHU BRADSHAW) - PLATELETS DROPPED TO 5K    Depression     Diabetes ([facility] [address] 2012    TYPE 2; NIDDM    DJD (degenerative joint disease), multiple sites 8/24/2017    GI bleed 2008    HEMORRHOIDS    Hyperlipemia     Hypertension with renal disease 8/24/2017    IBS (irritable bowel syndrome) 8/24/2017    Ill-defined condition 2015    PNEUMONIA X2 - HOSPITALIZED IN 2015    Interstitial lung disease ([facility] [address] 04/01/2019    Liver disease     FATTY LIVER    Myalgia 8/24/2017    On statin therapy 8/24/2017    Overactive bladder 8/24/2017    Pneumonia 04/2015    HOSPITALIZED 3 WEEKS.     Polymyalgia rheumatica ([facility] [address] 8/24/2017    Prophylactic antibiotic 8/24/2017    Reflex abnormality     acid reflex    Rosacea      Past Surgical History:   Procedure Laterality Date    HX APPENDECTOMY  1950    HX BREAST BIOPSY Bilateral     HX CATARACT REMOVAL Bilateral     HX COLONOSCOPY      HX ENDOSCOPY      HX GI      COLONOSCOPY    HX HEENT      WISDOM TEETH    HX HYSTERECTOMY  2000S    HX MASTECTOMY Right 1989    HX MASTECTOMY Left 2001    HX ORTHOPAEDIC  2008    LUMBAR FUSION    HX ORTHOPAEDIC  2018    RE-DO LUMBAR FUSION, AND ADDED THORACIC FUSION    HX ORTHOPAEDIC  03/26/2019    neckectomy    HX TUBAL LIGATION  1981    HX UROLOGICAL  2000s    BLADDER SLING    PA BREAST SURGERY PROCEDURE UNLISTED  1993, 2002    RECONSTRUCTION X2    PA TOTAL HIP ARTHROPLASTY Left 11/16/2016    ANTERIOR APPROACH, DR Gwendolyn De La Torre; (POSTOP: STANDS ONE INCH TALLER ON LEFT FOOT)    PA TOTAL HIP ARTHROPLASTY Right 12/2016    ANTERIOR APPROACH (DR JAIME)     Allergies   Allergen Reactions    Metformin Diarrhea    Statins-Hmg-Coa Reductase Inhibitors Myalgia     Myalgia with  multiple statins  Takes pravachol     Codeine Rash     Rash on thighs    Darvon [Propoxyphene] Other (comments)     \"I see worms\"    Pcn [Penicillins] Rash    Sulfa (Sulfonamide Antibiotics) Rash       REVIEW OF SYSTEMS:  General: negative for - chills or fever, or weight loss or gain  ENT: negative for - headaches, nasal congestion or tinnitus  Eyes: no blurred or visual changes  Neck: No stiffness or swollen nodes  Respiratory: negative for - cough, hemoptysis, change of her chronic shortness of breath or wheezing  Cardiovascular : negative for - chest pain, edema, palpitations or shortness of breath  Gastrointestinal: negative for - abdominal pain, blood in stools, heartburn or nausea/vomiting  Genito-Urinary: no dysuria, trouble voiding, or hematuria  Musculoskeletal: negative for - gait disturbance, joint pain, joint stiffness or joint swelling  Neurological: no TIA or stroke symptoms. Positive for tremor  Hematologic: no bruises, no bleeding  Lymphatic: no swollen glands  Integument: no lumps, mole changes, nail changes or rash  Endocrine:no malaise/lethargy poly uria or polydipsia or unexpected weight changes        Social History     Socioeconomic History    Marital status:    Tobacco Use    Smoking status: Never Smoker    Smokeless tobacco: Never Used   Vaping Use    Vaping Use: Never used   Substance and Sexual Activity    Alcohol use: No    Drug use: No    Sexual activity: Never   Social History Narrative    Lives in [facility] alone. Has one son in [place] and one daughter in [facility]. . Used to work as an  for [employer] of education.        Family History   Problem Relation Age of Onset    Heart Disease Mother     Kidney Disease Mother     Lung Disease Mother         COPD    Diabetes Brother     Pacemaker Brother     Kidney Disease Brother     Hypertension Brother     Anesth Problems Neg Hx        OBJECTIVE:     Visit Vitals  /82   Pulse 88   Temp 99.7 °F (37.6 °C)   Resp 20   Ht 5' 3\" (1.6 m)   Wt 203 lb 14.4 oz (92.5 kg)   SpO2 94%   BMI 36.12 kg/m²     CONSTITUTIONAL:   well nourished, appears age appropriate  EYES: sclera anicteric, PERRL, EOMI  ENMT:nares clear, moist mucous membranes, pharynx clear  NECK: supple. Thyroid normal, No JVD or bruits  RESPIRATORY: Chest: clear to ascultation and percussion, normal inspiratory effort except mild decreased breath sounds  CARDIOVASCULAR: Heart: regular rate and rhythm no murmurs, rubs or gallops, PMI not displaced, No thrills, no peripheral edema  GASTROINTESTINAL: Abdomen: non distended, soft, non tender, bowel sounds normal  HEMATOLOGIC: no purpura, petechiae or bruising  LYMPHATIC: No lymph node enlargemant  MUSCULOSKELETAL: Extremities: no active synovitis, pulse 1+   INTEGUMENT: No unusual rashes or suspicious skin lesions noted. Nails appear normal.  PERIPHERAL VASCULAR: normal pulses femoral, PT and DP  NEUROLOGIC: non-focal exam, A & O X 3. Resting tremor noted  PSYCHIATRIC:, appropriate affect     ASSESSMENT:   1. Interstitial lung disease ([address]    2. Chronic hypoxemic respiratory failure (HCC)    3. Tremor      Impression  1. Interstitial lung disease seems stable O2 sat today room air 94%  2. Chronic hypoxemic respiratory failure actually improved and  3. Tremor we will try Mysoline 25 mg  4. Hypertension blood pressure initially up but repeat adequate so continue current treatment  Follow-up in 1 month or sooner if there is a problem. PLAN:  .  Orders Placed This Encounter    primidone (Mysoline) 50 mg tablet         ATTENTION:   This medical record was transcribed using an electronic medical records system. Although proofread, it may and can contain electronic and spelling errors. Other human spelling and other errors may be present. Corrections may be executed at a later time. Please feel free to contact us for any clarifications as needed. Follow-up and Dispositions    · Return in about 4 weeks (around 6/13/2022). No results found for any visits on 05/16/22.     Kenny Redmond MD    The patient verbalized understanding of the problems and plans as explained.

## 2022-05-24 RX ORDER — PRIMIDONE 50 MG/1
25 TABLET ORAL 2 TIMES DAILY
Qty: 60 TABLET | Refills: 3 | Status: SHIPPED | OUTPATIENT
Start: 2022-05-24 | End: 2022-10-17 | Stop reason: SDUPTHER

## 2022-05-24 NOTE — TELEPHONE ENCOUNTER
PCP: Kelsea Dunaway MD    Last appt: 5/16/2022  Future Appointments   Date Time Provider Gwendolyn Loving   6/20/2022  1:30 PM Kelsea Dunaway MD Located within [facility] BS AMB   7/15/2022 10:40 AM Kelsea Dunaway MD Located within [facility] BS AMB   8/2/2022  8:50 AM Jennifer Nelson MD [facility] [address] BS AMB       Requested Prescriptions     Pending Prescriptions Disp Refills    primidone (Mysoline) 50 mg tablet 60 Tablet 3     Sig: Take 0.5 Tablets by mouth two (2) times a day.          Other Comments:

## 2022-05-31 RX ORDER — INSULIN GLARGINE 100 [IU]/ML
INJECTION, SOLUTION SUBCUTANEOUS
Qty: 10 PEN | Refills: 3
Start: 2022-05-31 | End: 2022-06-13

## 2022-06-03 DIAGNOSIS — F41.9 ANXIETY: ICD-10-CM

## 2022-06-03 RX ORDER — CLONAZEPAM 1 MG/1
1 TABLET ORAL 2 TIMES DAILY
Qty: 60 TABLET | Refills: 0 | Status: SHIPPED | OUTPATIENT
Start: 2022-06-03 | End: 2022-06-20 | Stop reason: ALTCHOICE

## 2022-06-03 NOTE — TELEPHONE ENCOUNTER
RX refill request from the patient/pharmacy. Patient last seen 05- with labs, and next appt. scheduled for 06-  Requested Prescriptions     Pending Prescriptions Disp Refills    clonazePAM (KlonoPIN) 1 mg tablet [Pharmacy Med Name: CLONAZEPAM 1MG] 60 Tablet 0     Sig: TAKE 1 TABLET BY MOUTH TWO (2) TIMES A DAY. MAX DAILY AMOUNT: 2 MG.    Yunier Wisdom

## 2022-06-13 RX ORDER — INSULIN GLARGINE 100 [IU]/ML
INJECTION, SOLUTION SUBCUTANEOUS
Qty: 10 PEN | Refills: 3
Start: 2022-06-13 | End: 2022-10-20

## 2022-06-19 NOTE — PROGRESS NOTES
Chief Complaint   Patient presents with    Hypertension     1 month follow up    Diabetes    Cholesterol Problem       SUBJECTIVE:    Ronan Hurtado is a 78 y.o. female returns today in follow-up of her severe interstitial lung disease on chronic prednisone therapy, chronic kidney disease, tremor, hypertension, diabetes and other multiple medical problems. In addition she has multiple acute problems going on. She was previously stable on Serax for anxiety for quite a few years and now her insurance company no longer covers her pulmonologist changed to Mary Mota and she is not tolerating that at all. She is crying all the time and extremely anxious. She does continue taking her Zoloft 100 mg daily. She also notes that she would like something for pain rather than oxycodone. She did have a fall about 3 weeks ago and was evaluated by home health at which time she had some x-rays apparently of the right shoulder and her right ribs and was told that was normal but she still has pain in the right rib cage area and she has pain in the right hip as well as her right ankle and is having difficulty walking because of that. She denies any increase of her chronic dyspnea and has no cough or chest congestion or other cardiorespiratory complaints. She notes that she did go back to prednisone 30 mg daily when her pulmonologist had suggested decreasing the 10 but with 10 it did not control her lung disease. She denies any headaches, dizziness or neurologic complaints. She notes no nausea vomiting no GI complaints and there are no  complaints other than she notes some urinary incontinence and would like to stop taking the Lasix which apparently home health placed her on the Lasix because she was having problems with elevated blood pressure although I was not aware that they placed on that as she is on hydrochlorothiazide.   She does have multiple arthritic complaints but acutely has the pain is noted in the right hip right ankle and right chest wall. The remainder complete review of systems is negative other than extreme anxiety. Current Outpatient Medications   Medication Sig Dispense Refill    Dulera 200-5 mcg/actuation HFA inhaler Take 2 Puffs by inhalation two (2) times a day.  sertraline (ZOLOFT) 100 mg tablet TAKE 2 TABLETS DAILY 180 Tablet 3    traMADoL (ULTRAM) 50 mg tablet Take 1 Tablet by mouth every six (6) hours as needed for Pain for up to 30 days. Max Daily Amount: 200 mg. 120 Tablet 0    LORazepam (ATIVAN) 1 mg tablet Take 1 Tablet by mouth every eight (8) hours as needed for Anxiety. Max Daily Amount: 3 mg. 90 Tablet 0    Insulin Needles, Disposable, (BD Ultra-Fine Short Pen Needle) 31 gauge x 5/16\" ndle USE UNDER THE SKIN DAILY. USE WITH LANTUS FLEX PEN DAILY 100 Pen Needle 3    glucose blood VI test strips (True Metrix Glucose Test Strip) strip USE ONCE DAILY AND RECORD RESULTS IN A NOTEBOOK ALSO RECORD LAST MEALTIME IN NOTEBOOK 100 Strip 3    insulin NPH (HUMULIN N) 100 unit/mL (3 mL) inpn 45 U daily while on 20 mg of prednisone dose--Dose change 06/13/22--updated med list--did not send prescription to the pharmacy 3 Pen 5    insulin glargine (Lantus Solostar U-100 Insulin) 100 unit/mL (3 mL) inpn INJECT 45 UNITS IN THE MORNING AND 36 UNITS AT NIGHT--Dose change 06/13/22--updated med list--did not send prescription to the pharmacy 10 Pen 3    primidone (Mysoline) 50 mg tablet Take 0.5 Tablets by mouth two (2) times a day. 60 Tablet 3    Ofev 150 mg cap two (2) times a day.  predniSONE (DELTASONE) 10 mg tablet Take 3 tablets daily-Dose change 05/03/22--updated med list--did not send prescription to the pharmacy 360 Tablet 3    benzonatate (TESSALON) 200 mg capsule TAKE 1 CAPSULE BY MOUTH THREE TIMES DAILY AS NEEDED FOR COUGH - SWALLOW CAPSULE WHOLE (DO NOT CRUSH) 60 Capsule 1    potassium chloride (K-DUR, KLOR-CON M20) 20 mEq tablet Take 1 Tablet by mouth two (2) times a day.  [address] [place] [address] Tablet 3    hydroCHLOROthiazide (HYDRODIURIL) 25 mg tablet TAKE 1 TABLET DAILY FOR FLUID AND BLOOD PRESSURE 90 Tablet 3    albuterol (ProAir HFA) 90 mcg/actuation inhaler Take 1 Puff by inhalation every four (4) hours as needed for Wheezing or Shortness of Breath. 18 g 5    rosuvastatin (CRESTOR) 20 mg tablet Take 1 Tablet by mouth nightly. 90 Tablet 3    buPROPion XL (WELLBUTRIN XL) 150 mg tablet TAKE 1 TABLET DAILY 90 Tablet 3    carvediloL (COREG) 6.25 mg tablet TAKE 1 TABLET TWICE A  Tablet 3    fenofibrate nanocrystallized (TRICOR) 145 mg tablet Take 1 Tablet by mouth daily. 90 Tablet 3    clemastine 2.68 mg tab tablet Take 2.68 mg by mouth two (2) times a day.  clindamycin (CLEOCIN) 300 mg capsule Take 2 capsules 1 hour before dental procedure 10 Cap 0    pantoprazole (PROTONIX) 40 mg tablet Take 40 mg by mouth daily.  montelukast sodium (SINGULAIR PO) Take 10 mg by mouth nightly. 10 mg tab       Past Medical History:   Diagnosis Date    Abnormal LFTs 08/24/2017    FATTY LIVER    Arthritis     OSTEO    Avascular necrosis of hip ([address] 08/24/2017    BILAT HIPS    Breast CA ([facility] 1989, 2001    BILATERAL; SURGERY, CHEMO    Chronic pain     CKD (chronic kidney disease), stage II 8/24/2017    Coagulation disorder ([address] 1984    ITP  (DR CHU BRADSHAW) - PLATELETS DROPPED TO 5K    Depression     Diabetes ([address] 2012    TYPE 2; NIDDM    DJD (degenerative joint disease), multiple sites 8/24/2017    GI bleed 2008    HEMORRHOIDS    Hyperlipemia     Hypertension with renal disease 8/24/2017    IBS (irritable bowel syndrome) 8/24/2017    Ill-defined condition 2015    PNEUMONIA X2 - HOSPITALIZED IN 2015    Interstitial lung disease ([facility] 04/01/2019    Liver disease     FATTY LIVER    Myalgia 8/24/2017    On statin therapy 8/24/2017    Overactive bladder 8/24/2017    Pneumonia 04/2015    HOSPITALIZED 3 WEEKS.     Polymyalgia rheumatica (HCC) 8/24/2017    Prophylactic antibiotic 8/24/2017    Reflex abnormality     acid reflex    Rosacea      Past Surgical History:   Procedure Laterality Date    HX APPENDECTOMY  1950    HX BREAST BIOPSY Bilateral     HX CATARACT REMOVAL Bilateral     HX COLONOSCOPY      HX ENDOSCOPY      HX GI      COLONOSCOPY    HX HEENT      WISDOM TEETH    HX HYSTERECTOMY  2000S    HX MASTECTOMY Right 1989    HX MASTECTOMY Left 2001    HX ORTHOPAEDIC  2008    LUMBAR FUSION    HX ORTHOPAEDIC  2018    RE-DO LUMBAR FUSION, AND ADDED THORACIC FUSION    HX ORTHOPAEDIC  03/26/2019    neckectomy    HX TUBAL LIGATION  1981    HX UROLOGICAL  2000s    BLADDER SLING    CA BREAST SURGERY PROCEDURE UNLISTED  1993, 2002    RECONSTRUCTION X2    CA TOTAL HIP ARTHROPLASTY Left 11/16/2016    ANTERIOR APPROACH, DR Gwendolyn De La Torre; (POSTOP: STANDS ONE INCH TALLER ON LEFT FOOT)    CA TOTAL HIP ARTHROPLASTY Right 12/2016    ANTERIOR APPROACH (DR JAIME)     Allergies   Allergen Reactions    Metformin Diarrhea    Statins-Hmg-Coa Reductase Inhibitors Myalgia     Myalgia with  multiple statins  Takes pravachol     Codeine Rash     Rash on thighs    Darvon [Propoxyphene] Other (comments)     \"I see worms\"    Pcn [Penicillins] Rash    Sulfa (Sulfonamide Antibiotics) Rash       REVIEW OF SYSTEMS:  General: negative for - chills or fever, or weight loss or gain  ENT: negative for - headaches, nasal congestion or tinnitus  Eyes: no blurred or visual changes  Neck: No stiffness or swollen nodes  Respiratory: negative for - cough, hemoptysis, change of her chronic shortness of breath now that she is back on prednisone at 30 mg daily or wheezing  Cardiovascular : negative for - chest pain, edema, palpitations or shortness of breath  Gastrointestinal: negative for - abdominal pain, blood in stools, heartburn or nausea/vomiting  Genito-Urinary: no dysuria, trouble voiding, or hematuria  Musculoskeletal: negative for - gait disturbance, change of her chronic joint pain, joint stiffness or joint swelling except as noted above the right hip, the right ankle and the right chest wall  Neurological: no TIA or stroke symptoms  Hematologic: no bruises, no bleeding  Lymphatic: no swollen glands  Integument: no lumps, mole changes, nail changes or rash  Endocrine:no malaise/lethargy poly uria or polydipsia or unexpected weight changes  Psychiatric: Extreme anxiety and crying all the time but no suicidal thoughts ideations        Social History     Socioeconomic History    Marital status:    Tobacco Use    Smoking status: Never Smoker    Smokeless tobacco: Never Used   Vaping Use    Vaping Use: Never used   Substance and Sexual Activity    Alcohol use: No    Drug use: No    Sexual activity: Never   Social History Narrative    Lives in [facility] alone. Has one son in [place] and one daughter in [facility]. . Used to work as an  for [employer] of education. Family History   Problem Relation Age of Onset    Heart Disease Mother     Kidney Disease Mother     Lung Disease Mother         COPD    Diabetes Brother     Pacemaker Brother     Kidney Disease Brother     Hypertension Brother     Anesth Problems Neg Hx        OBJECTIVE:     Visit Vitals  /82 (BP 1 Location: Left upper arm, BP Patient Position: Sitting, BP Cuff Size: Large adult)   Pulse 91   Temp 99.2 °F (37.3 °C) (Oral)   Resp 19   Ht 5' 3\" (1.6 m)   Wt 193 lb 9.6 oz (87.8 kg)   SpO2 95%   BMI 34.29 kg/m²     CONSTITUTIONAL:   well nourished, appears age appropriate  EYES: sclera anicteric, PERRL, EOMI  ENMT:nares clear, moist mucous membranes, pharynx clear  NECK: supple.  Thyroid normal, No JVD or bruits  RESPIRATORY: Chest: clear to ascultation and percussion, normal inspiratory effort  CARDIOVASCULAR: Heart: regular rate and rhythm no murmurs, rubs or gallops, PMI not displaced, No thrills, no peripheral edema  GASTROINTESTINAL: Abdomen: non distended, soft, non tender, bowel sounds normal  HEMATOLOGIC: no purpura, petechiae or bruising  LYMPHATIC: No lymph node enlargemant  MUSCULOSKELETAL: Extremities: no active synovitis, pulse 1+. Right chest wall tender to palpation. Right hip tender to palpation although she is walking with a walker. Right ankle also has mild discomfort range of motion but no joint effusion. INTEGUMENT: No unusual rashes or suspicious skin lesions noted. Nails appear normal.  PERIPHERAL VASCULAR: normal pulses femoral, PT and DP  NEUROLOGIC: non-focal exam, A & O X 3  PSYCHIATRIC: Extremely tearful and anxious white female    ASSESSMENT:   1. Interstitial lung disease ([address]    2. Chronic hypoxemic respiratory failure (HCC)    3. Tremor    4. Hypertension with renal disease    5. Rib pain on right side    6. Hip pain, acute, right    7. Acute right ankle pain    8. Anxiety    9. Recurrent depression ([address]    10. Controlled type 2 diabetes mellitus with stage 2 chronic kidney disease, with long-term current use of insulin ([address]    11. Depression, unspecified depression type    12. Primary osteoarthritis involving multiple joints      Impression  1. Interstitial lung disease chronic and O2 sat today 95%  2. Chronic hypoxemic respiratory failure seems to be stable from the standpoint  3. Tremor she still on Mysoline  4. Hypertension that is controlled and I am not sure why she would be placed on Lasix for hypertension Dyazide told we can stop that even it was not on her med list anyway  5. Rib pain right side x-ray obtained today shows arthritic changes but I see no evidence of a fracture  6. Hip pain right side x-ray obtained today shows previous prostatic joint replacement and no evidence of malalignment  7. Ankle pain right side x-ray obtained today shows severe arthritic changes but I do not see an acute finding. 8.  Anxiety her insurance apparently will cover Ativan so we will try Ativan 1 mg 3 times daily as needed #90 given  9.   Recurrent depression I am increasing her Zoloft to 200 mg daily  10. Diabetes mellitus apparently her blood sugar was markedly up which she came to the office today so I am checking a BMP today  11. DJD multiple areas x-rays as noted above  High complexity patient with high complexity decision making addressing multiple medical problems chronic and acute today with 45 minutes spent in direct care of this patient with greater than 50% in counseling coordination of care today. Follow-up with me again in 1 month or sooner should the be a problem. I will change her pain medicine from oxycodone to tramadol. PLAN:  .  Orders Placed This Encounter    XR HIP RT W OR WO PELV 2-3 VWS    XR RIBS RT W PA CXR MIN 3 V    XR ANKLE RT MIN 3 V    METABOLIC PANEL, BASIC    Dulera 200-5 mcg/actuation HFA inhaler    sertraline (ZOLOFT) 100 mg tablet    traMADoL (ULTRAM) 50 mg tablet    LORazepam (ATIVAN) 1 mg tablet    Insulin Needles, Disposable, (BD Ultra-Fine Short Pen Needle) 31 gauge x 5/16\" ndle    glucose blood VI test strips (True Metrix Glucose Test Strip) strip         ATTENTION:   This medical record was transcribed using an electronic medical records system. Although proofread, it may and can contain electronic and spelling errors. Other human spelling and other errors may be present. Corrections may be executed at a later time. Please feel free to contact us for any clarifications as needed. Follow-up and Dispositions    · Return in about 4 weeks (around 7/18/2022). No results found for any visits on 06/20/22. Chester Mckeon MD    The patient verbalized understanding of the problems and plans as explained.

## 2022-06-20 ENCOUNTER — OFFICE VISIT (OUTPATIENT)
Dept: INTERNAL MEDICINE CLINIC | Age: 77
End: 2022-06-20
Payer: MEDICARE

## 2022-06-20 VITALS
BODY MASS INDEX: 34.3 KG/M2 | RESPIRATION RATE: 19 BRPM | HEIGHT: 63 IN | OXYGEN SATURATION: 95 % | TEMPERATURE: 99.2 F | HEART RATE: 91 BPM | SYSTOLIC BLOOD PRESSURE: 116 MMHG | WEIGHT: 193.6 LBS | DIASTOLIC BLOOD PRESSURE: 82 MMHG

## 2022-06-20 DIAGNOSIS — F32.A DEPRESSION, UNSPECIFIED DEPRESSION TYPE: ICD-10-CM

## 2022-06-20 DIAGNOSIS — F33.9 RECURRENT DEPRESSION (HCC): ICD-10-CM

## 2022-06-20 DIAGNOSIS — M25.551 HIP PAIN, ACUTE, RIGHT: ICD-10-CM

## 2022-06-20 DIAGNOSIS — I12.9 HYPERTENSION WITH RENAL DISEASE: ICD-10-CM

## 2022-06-20 DIAGNOSIS — J96.11 CHRONIC HYPOXEMIC RESPIRATORY FAILURE (HCC): ICD-10-CM

## 2022-06-20 DIAGNOSIS — R07.81 RIB PAIN ON RIGHT SIDE: ICD-10-CM

## 2022-06-20 DIAGNOSIS — M25.571 ACUTE RIGHT ANKLE PAIN: ICD-10-CM

## 2022-06-20 DIAGNOSIS — Z79.4 CONTROLLED TYPE 2 DIABETES MELLITUS WITH STAGE 2 CHRONIC KIDNEY DISEASE, WITH LONG-TERM CURRENT USE OF INSULIN (HCC): ICD-10-CM

## 2022-06-20 DIAGNOSIS — J84.9 INTERSTITIAL LUNG DISEASE (HCC): Primary | ICD-10-CM

## 2022-06-20 DIAGNOSIS — M15.9 PRIMARY OSTEOARTHRITIS INVOLVING MULTIPLE JOINTS: ICD-10-CM

## 2022-06-20 DIAGNOSIS — N18.2 CONTROLLED TYPE 2 DIABETES MELLITUS WITH STAGE 2 CHRONIC KIDNEY DISEASE, WITH LONG-TERM CURRENT USE OF INSULIN (HCC): ICD-10-CM

## 2022-06-20 DIAGNOSIS — R25.1 TREMOR: ICD-10-CM

## 2022-06-20 DIAGNOSIS — F41.9 ANXIETY: ICD-10-CM

## 2022-06-20 DIAGNOSIS — E11.22 CONTROLLED TYPE 2 DIABETES MELLITUS WITH STAGE 2 CHRONIC KIDNEY DISEASE, WITH LONG-TERM CURRENT USE OF INSULIN (HCC): ICD-10-CM

## 2022-06-20 PROCEDURE — G8754 DIAS BP LESS 90: HCPCS | Performed by: INTERNAL MEDICINE

## 2022-06-20 PROCEDURE — 2022F DILAT RTA XM EVC RTNOPTHY: CPT | Performed by: INTERNAL MEDICINE

## 2022-06-20 PROCEDURE — G8427 DOCREV CUR MEDS BY ELIG CLIN: HCPCS | Performed by: INTERNAL MEDICINE

## 2022-06-20 PROCEDURE — 1090F PRES/ABSN URINE INCON ASSESS: CPT | Performed by: INTERNAL MEDICINE

## 2022-06-20 PROCEDURE — 99215 OFFICE O/P EST HI 40 MIN: CPT | Performed by: INTERNAL MEDICINE

## 2022-06-20 PROCEDURE — 1123F ACP DISCUSS/DSCN MKR DOCD: CPT | Performed by: INTERNAL MEDICINE

## 2022-06-20 PROCEDURE — G8536 NO DOC ELDER MAL SCRN: HCPCS | Performed by: INTERNAL MEDICINE

## 2022-06-20 PROCEDURE — G8752 SYS BP LESS 140: HCPCS | Performed by: INTERNAL MEDICINE

## 2022-06-20 PROCEDURE — 3017F COLORECTAL CA SCREEN DOC REV: CPT | Performed by: INTERNAL MEDICINE

## 2022-06-20 PROCEDURE — G8399 PT W/DXA RESULTS DOCUMENT: HCPCS | Performed by: INTERNAL MEDICINE

## 2022-06-20 PROCEDURE — G9717 DOC PT DX DEP/BP F/U NT REQ: HCPCS | Performed by: INTERNAL MEDICINE

## 2022-06-20 PROCEDURE — 3046F HEMOGLOBIN A1C LEVEL >9.0%: CPT | Performed by: INTERNAL MEDICINE

## 2022-06-20 PROCEDURE — G8417 CALC BMI ABV UP PARAM F/U: HCPCS | Performed by: INTERNAL MEDICINE

## 2022-06-20 PROCEDURE — 1101F PT FALLS ASSESS-DOCD LE1/YR: CPT | Performed by: INTERNAL MEDICINE

## 2022-06-20 RX ORDER — PEN NEEDLE, DIABETIC 30 GX3/16"
NEEDLE, DISPOSABLE MISCELLANEOUS
Qty: 100 PEN NEEDLE | Refills: 3 | Status: SHIPPED | OUTPATIENT
Start: 2022-06-20 | End: 2022-09-12

## 2022-06-20 RX ORDER — LORAZEPAM 1 MG/1
1 TABLET ORAL
Qty: 90 TABLET | Refills: 0 | Status: SHIPPED | OUTPATIENT
Start: 2022-06-20

## 2022-06-20 RX ORDER — TRAMADOL HYDROCHLORIDE 50 MG/1
50 TABLET ORAL
Qty: 120 TABLET | Refills: 0 | Status: SHIPPED | OUTPATIENT
Start: 2022-06-20 | End: 2022-07-20

## 2022-06-20 RX ORDER — SERTRALINE HYDROCHLORIDE 100 MG/1
TABLET, FILM COATED ORAL
Qty: 180 TABLET | Refills: 3 | Status: SHIPPED | OUTPATIENT
Start: 2022-06-20 | End: 2022-08-01

## 2022-06-20 RX ORDER — CALCIUM CITRATE/VITAMIN D3 200MG-6.25
TABLET ORAL
Qty: 100 STRIP | Refills: 3 | Status: SHIPPED | OUTPATIENT
Start: 2022-06-20

## 2022-06-20 RX ORDER — MOMETASONE FUROATE AND FORMOTEROL FUMARATE DIHYDRATE 200; 5 UG/1; UG/1
2 AEROSOL RESPIRATORY (INHALATION) 2 TIMES DAILY
COMMUNITY
Start: 2022-05-20

## 2022-06-20 NOTE — PROGRESS NOTES
Chief Complaint   Patient presents with    Hypertension     1 month follow up    Diabetes    Cholesterol Problem     Visit Vitals  /82 (BP 1 Location: Left upper arm, BP Patient Position: Sitting, BP Cuff Size: Large adult)   Pulse 91   Temp 99.2 °F (37.3 °C) (Oral)   Resp 19   Ht 5' 3\" (1.6 m)   Wt 193 lb 9.6 oz (87.8 kg)   SpO2 95%   BMI 34.29 kg/m²     1. Have you been to the ER, urgent care clinic since your last visit? Hospitalized since your last visit? No    2. Have you seen or consulted any other health care providers outside of the [address] [place] [address] since your last visit? Include any pap smears or colon screening.  No

## 2022-06-21 LAB
ANION GAP SERPL CALC-SCNC: 14 MMOL/L (ref 5–15)
BUN SERPL-MCNC: 41 MG/DL (ref 6–20)
BUN/CREAT SERPL: 29 (ref 12–20)
CALCIUM SERPL-MCNC: 10.2 MG/DL (ref 8.5–10.1)
CHLORIDE SERPL-SCNC: 87 MMOL/L (ref 97–108)
CO2 SERPL-SCNC: 30 MMOL/L (ref 21–32)
CREAT SERPL-MCNC: 1.39 MG/DL (ref 0.55–1.02)
GLUCOSE SERPL-MCNC: 489 MG/DL (ref 65–100)
POTASSIUM SERPL-SCNC: 3.7 MMOL/L (ref 3.5–5.1)
SODIUM SERPL-SCNC: 131 MMOL/L (ref 136–145)

## 2022-06-22 NOTE — TELEPHONE ENCOUNTER
RX refill request from the patient/pharmacy. Patient last seen 2022 with labs, and next appt. scheduled for 07-  Requested Prescriptions     Pending Prescriptions Disp Refills    insulin NPH (HUMULIN N) 100 unit/mL (3 mL) inpn 3 Pen 5     Si U daily while on 20 mg of prednisone dose--Dose change 22--updated med list--did not send prescription to the pharmacy   .

## 2022-06-23 NOTE — PROGRESS NOTES
Increase NPH Insulin to 60 U if currently taking 45 U. Discussed results with patient. This RN in appropriate ppe while in pt room. Pt wearing mask.        Whit Crockett, RN  11/03/21 [id number]

## 2022-07-05 NOTE — TELEPHONE ENCOUNTER
PCP: Loulou Bailon MD    Last appt: 2022  Future Appointments   Date Time Provider Gwendolyn Loving   7/15/2022 10:40 AM Loulou Bailon MD [facility] BS AMB   2022  8:50 AM Severo Favre, MD [facility] BS AMB       Requested Prescriptions     Pending Prescriptions Disp Refills    insulin NPH (HUMULIN N) 100 unit/mL (3 mL) inpn 3 Pen 5     Si U daily while on 30 mg of prednisone dose--Dose change 22--updated med list--did not send prescription to the pharmacy         Other Comments:

## 2022-07-11 RX ORDER — CARVEDILOL 6.25 MG/1
TABLET ORAL
Qty: 180 TABLET | Refills: 3 | Status: SHIPPED | OUTPATIENT
Start: 2022-07-11

## 2022-07-11 NOTE — TELEPHONE ENCOUNTER
RX refill request from the patient/pharmacy.  Patient last seen 06- with labs, and next appt. scheduled for 07-  Requested Prescriptions     Pending Prescriptions Disp Refills    carvediloL (COREG) 6.25 mg tablet [Pharmacy Med Name: CARVEDILOL (IR) TABS 6.25MG] 180 Tablet 3     Sig: TAKE 1 TABLET TWICE A DAY

## 2022-07-15 ENCOUNTER — OFFICE VISIT (OUTPATIENT)
Dept: INTERNAL MEDICINE CLINIC | Age: 77
End: 2022-07-15
Payer: MEDICARE

## 2022-07-15 VITALS
RESPIRATION RATE: 19 BRPM | TEMPERATURE: 98.7 F | HEIGHT: 63 IN | HEART RATE: 91 BPM | DIASTOLIC BLOOD PRESSURE: 88 MMHG | OXYGEN SATURATION: 93 % | WEIGHT: 194.3 LBS | SYSTOLIC BLOOD PRESSURE: 134 MMHG | BODY MASS INDEX: 34.43 KG/M2

## 2022-07-15 DIAGNOSIS — J96.11 CHRONIC HYPOXEMIC RESPIRATORY FAILURE (HCC): ICD-10-CM

## 2022-07-15 DIAGNOSIS — E78.2 MIXED HYPERLIPIDEMIA: ICD-10-CM

## 2022-07-15 DIAGNOSIS — N18.2 CONTROLLED TYPE 2 DIABETES MELLITUS WITH STAGE 2 CHRONIC KIDNEY DISEASE, WITH LONG-TERM CURRENT USE OF INSULIN (HCC): ICD-10-CM

## 2022-07-15 DIAGNOSIS — I12.9 HYPERTENSION WITH RENAL DISEASE: Primary | ICD-10-CM

## 2022-07-15 DIAGNOSIS — M15.9 PRIMARY OSTEOARTHRITIS INVOLVING MULTIPLE JOINTS: ICD-10-CM

## 2022-07-15 DIAGNOSIS — Z79.4 CONTROLLED TYPE 2 DIABETES MELLITUS WITH STAGE 2 CHRONIC KIDNEY DISEASE, WITH LONG-TERM CURRENT USE OF INSULIN (HCC): ICD-10-CM

## 2022-07-15 DIAGNOSIS — N18.2 CKD (CHRONIC KIDNEY DISEASE), STAGE II: ICD-10-CM

## 2022-07-15 DIAGNOSIS — E66.09 CLASS 1 OBESITY DUE TO EXCESS CALORIES WITHOUT SERIOUS COMORBIDITY WITH BODY MASS INDEX (BMI) OF 33.0 TO 33.9 IN ADULT: ICD-10-CM

## 2022-07-15 DIAGNOSIS — E11.22 CONTROLLED TYPE 2 DIABETES MELLITUS WITH STAGE 2 CHRONIC KIDNEY DISEASE, WITH LONG-TERM CURRENT USE OF INSULIN (HCC): ICD-10-CM

## 2022-07-15 DIAGNOSIS — J84.9 INTERSTITIAL LUNG DISEASE (HCC): ICD-10-CM

## 2022-07-15 LAB
ALBUMIN SERPL-MCNC: 3.8 G/DL (ref 3.5–5)
ALBUMIN/GLOB SERPL: 1.3 {RATIO} (ref 1.1–2.2)
ALP SERPL-CCNC: 198 U/L (ref 45–117)
ALT SERPL-CCNC: 124 U/L (ref 12–78)
ANION GAP SERPL CALC-SCNC: 7 MMOL/L (ref 5–15)
AST SERPL-CCNC: 75 U/L (ref 15–37)
BILIRUB SERPL-MCNC: 0.7 MG/DL (ref 0.2–1)
BUN SERPL-MCNC: 27 MG/DL (ref 6–20)
BUN/CREAT SERPL: 34 (ref 12–20)
CALCIUM SERPL-MCNC: 9.3 MG/DL (ref 8.5–10.1)
CHLORIDE SERPL-SCNC: 106 MMOL/L (ref 97–108)
CHOLEST SERPL-MCNC: 195 MG/DL
CK SERPL-CCNC: 70 U/L (ref 26–192)
CO2 SERPL-SCNC: 29 MMOL/L (ref 21–32)
CREAT SERPL-MCNC: 0.79 MG/DL (ref 0.55–1.02)
EST. AVERAGE GLUCOSE BLD GHB EST-MCNC: 229 MG/DL
GLOBULIN SER CALC-MCNC: 3 G/DL (ref 2–4)
GLUCOSE SERPL-MCNC: 116 MG/DL (ref 65–100)
HBA1C MFR BLD: 9.6 % (ref 4–5.6)
HDLC SERPL-MCNC: 76 MG/DL
HDLC SERPL: 2.6 {RATIO} (ref 0–5)
LDLC SERPL CALC-MCNC: 78.8 MG/DL (ref 0–100)
POTASSIUM SERPL-SCNC: 3.4 MMOL/L (ref 3.5–5.1)
PROT SERPL-MCNC: 6.8 G/DL (ref 6.4–8.2)
SODIUM SERPL-SCNC: 142 MMOL/L (ref 136–145)
TRIGL SERPL-MCNC: 201 MG/DL (ref ?–150)
VLDLC SERPL CALC-MCNC: 40.2 MG/DL

## 2022-07-15 PROCEDURE — 99214 OFFICE O/P EST MOD 30 MIN: CPT | Performed by: INTERNAL MEDICINE

## 2022-07-15 PROCEDURE — 3017F COLORECTAL CA SCREEN DOC REV: CPT | Performed by: INTERNAL MEDICINE

## 2022-07-15 PROCEDURE — G9717 DOC PT DX DEP/BP F/U NT REQ: HCPCS | Performed by: INTERNAL MEDICINE

## 2022-07-15 PROCEDURE — G8536 NO DOC ELDER MAL SCRN: HCPCS | Performed by: INTERNAL MEDICINE

## 2022-07-15 PROCEDURE — 3046F HEMOGLOBIN A1C LEVEL >9.0%: CPT | Performed by: INTERNAL MEDICINE

## 2022-07-15 PROCEDURE — G8752 SYS BP LESS 140: HCPCS | Performed by: INTERNAL MEDICINE

## 2022-07-15 PROCEDURE — G8399 PT W/DXA RESULTS DOCUMENT: HCPCS | Performed by: INTERNAL MEDICINE

## 2022-07-15 PROCEDURE — 1123F ACP DISCUSS/DSCN MKR DOCD: CPT | Performed by: INTERNAL MEDICINE

## 2022-07-15 PROCEDURE — 2022F DILAT RTA XM EVC RTNOPTHY: CPT | Performed by: INTERNAL MEDICINE

## 2022-07-15 PROCEDURE — G8427 DOCREV CUR MEDS BY ELIG CLIN: HCPCS | Performed by: INTERNAL MEDICINE

## 2022-07-15 PROCEDURE — 1090F PRES/ABSN URINE INCON ASSESS: CPT | Performed by: INTERNAL MEDICINE

## 2022-07-15 PROCEDURE — 1101F PT FALLS ASSESS-DOCD LE1/YR: CPT | Performed by: INTERNAL MEDICINE

## 2022-07-15 PROCEDURE — G8417 CALC BMI ABV UP PARAM F/U: HCPCS | Performed by: INTERNAL MEDICINE

## 2022-07-15 PROCEDURE — G8754 DIAS BP LESS 90: HCPCS | Performed by: INTERNAL MEDICINE

## 2022-07-15 NOTE — PROGRESS NOTES
Chief Complaint   Patient presents with    Hypertension     3 month follow up    Diabetes       SUBJECTIVE:    Twyla Oliver is a 78 y.o. female who returns in follow-up for medical problems include hypertension, diabetes, hyperlipidemia, DJD, allergic rhinitis, interstitial lung disease, IBS, anxiety with depression, obesity and other multiple medical problems. She is taking her medication trying to follow her diet try and remain physically active. She currently denies any chest pain, shortness of breath, palpitations, PND, orthopnea or other cardiac or respiratory complaints. There are no current GI or  complaints. She has no headaches, dizziness or neurologic complaints. There are no current active arthritic complaints and there are no other complaints on complete review of systems. Current Outpatient Medications   Medication Sig Dispense Refill    carvediloL (COREG) 6.25 mg tablet TAKE 1 TABLET TWICE A  Tablet 3    insulin NPH (HUMULIN N) 100 unit/mL (3 mL) inpn 65 U daily while on 30 mg of prednisone dose--Dose change 07/02/22--updated med list--did not send prescription to the pharmacy 3 Pen 5    Dulera 200-5 mcg/actuation HFA inhaler Take 2 Puffs by inhalation two (2) times a day.  sertraline (ZOLOFT) 100 mg tablet TAKE 2 TABLETS DAILY 180 Tablet 3    traMADoL (ULTRAM) 50 mg tablet Take 1 Tablet by mouth every six (6) hours as needed for Pain for up to 30 days. Max Daily Amount: 200 mg. 120 Tablet 0    LORazepam (ATIVAN) 1 mg tablet Take 1 Tablet by mouth every eight (8) hours as needed for Anxiety. Max Daily Amount: 3 mg. 90 Tablet 0    Insulin Needles, Disposable, (BD Ultra-Fine Short Pen Needle) 31 gauge x 5/16\" ndle USE UNDER THE SKIN DAILY.  USE WITH LANTUS FLEX PEN DAILY 100 Pen Needle 3    glucose blood VI test strips (True Metrix Glucose Test Strip) strip USE ONCE DAILY AND RECORD RESULTS IN A NOTEBOOK ALSO RECORD LAST MEALTIME IN NOTEBOOK 100 Strip 3    insulin glargine (Lantus Solostar U-100 Insulin) 100 unit/mL (3 mL) inpn INJECT 45 UNITS IN THE MORNING AND 36 UNITS AT NIGHT--Dose change 06/13/22--updated med list--did not send prescription to the pharmacy 10 Pen 3    primidone (Mysoline) 50 mg tablet Take 0.5 Tablets by mouth two (2) times a day. 60 Tablet 3    Ofev 150 mg cap two (2) times a day.  predniSONE (DELTASONE) 10 mg tablet Take 3 tablets daily-Dose change 05/03/22--updated med list--did not send prescription to the pharmacy 360 Tablet 3    benzonatate (TESSALON) 200 mg capsule TAKE 1 CAPSULE BY MOUTH THREE TIMES DAILY AS NEEDED FOR COUGH - SWALLOW CAPSULE WHOLE (DO NOT CRUSH) 60 Capsule 1    potassium chloride (K-DUR, KLOR-CON M20) 20 mEq tablet Take 1 Tablet by mouth two (2) times a day. 180 Tablet 3    hydroCHLOROthiazide (HYDRODIURIL) 25 mg tablet TAKE 1 TABLET DAILY FOR FLUID AND BLOOD PRESSURE 90 Tablet 3    albuterol (ProAir HFA) 90 mcg/actuation inhaler Take 1 Puff by inhalation every four (4) hours as needed for Wheezing or Shortness of Breath. 18 g 5    rosuvastatin (CRESTOR) 20 mg tablet Take 1 Tablet by mouth nightly. 90 Tablet 3    buPROPion XL (WELLBUTRIN XL) 150 mg tablet TAKE 1 TABLET DAILY 90 Tablet 3    fenofibrate nanocrystallized (TRICOR) 145 mg tablet Take 1 Tablet by mouth daily. 90 Tablet 3    clemastine 2.68 mg tab tablet Take 2.68 mg by mouth two (2) times a day.  clindamycin (CLEOCIN) 300 mg capsule Take 2 capsules 1 hour before dental procedure 10 Cap 0    pantoprazole (PROTONIX) 40 mg tablet Take 40 mg by mouth daily.  montelukast sodium (SINGULAIR PO) Take 10 mg by mouth nightly.  10 mg tab       Past Medical History:   Diagnosis Date    Abnormal LFTs 08/24/2017    FATTY LIVER    Arthritis     OSTEO    Avascular necrosis of hip ([facility] 08/24/2017    BILAT HIPS    Breast CA ([facility] 1989, 2001    BILATERAL; SURGERY, CHEMO    Chronic pain     CKD (chronic kidney disease), stage II 8/24/2017    Coagulation disorder ([facility] 1984    ITP  (DR CHU BRADSHAW) - PLATELETS DROPPED TO 5K    Depression     Diabetes ([facility] 2012    TYPE 2; NIDDM    DJD (degenerative joint disease), multiple sites 8/24/2017    GI bleed 2008    HEMORRHOIDS    Hyperlipemia     Hypertension with renal disease 8/24/2017    IBS (irritable bowel syndrome) 8/24/2017    Ill-defined condition 2015    PNEUMONIA X2 - HOSPITALIZED IN 2015    Inter[facility] 04/01/2019    Liver disease     FATTY LIVER    Myalgia 8/24/2017    On statin therapy 8/24/2017    Overactive bladder 8/24/2017    Pneumonia 04/2015    HOSPITALIZED 3 WEEKS.     Polymyalgia rheumatica ([facility] 8/24/2017    Prophylactic antibiotic 8/24/2017    Reflex abnormality     acid reflex    Rosacea      Past Surgical History:   Procedure Laterality Date    HX APPENDECTOMY  1950    HX BREAST BIOPSY Bilateral     HX CATARACT REMOVAL Bilateral     HX COLONOSCOPY      HX ENDOSCOPY      HX GI      COLONOSCOPY    HX HEENT      WISDOM TEETH    HX HYSTERECTOMY  2000S    HX MASTECTOMY Right 1989    HX MASTECTOMY Left 2001    HX ORTHOPAEDIC  2008    LUMBAR FUSION    HX ORTHOPAEDIC  2018    RE-DO LUMBAR FUSION, AND ADDED THORACIC FUSION    HX ORTHOPAEDIC  03/26/2019    neckectomy    HX TUBAL LIGATION  1981    HX UROLOGICAL  2000s    BLADDER SLING    OH BREAST SURGERY PROCEDURE UNLISTED  1993, 2002    RECONSTRUCTION X2    OH TOTAL HIP ARTHROPLASTY Left 11/16/2016    ANTERIOR APPROACH, DR Gwendolyn De La Torre; (POSTOP: STANDS ONE INCH TALLER ON LEFT FOOT)    OH TOTAL HIP ARTHROPLASTY Right 12/2016    ANTERIOR APPROACH (DR JAIME)     Allergies   Allergen Reactions    Metformin Diarrhea    Statins-Hmg-Coa Reductase Inhibitors Myalgia     Myalgia with  multiple statins  Takes pravachol     Codeine Rash     Rash on thighs    Darvon [Propoxyphene] Other (comments)     \"I see worms\"    Pcn [Penicillins] Rash    Sulfa (Sulfonamide Antibiotics) Rash       REVIEW OF SYSTEMS:  General: negative for - chills or fever, or weight loss or gain  ENT: negative for - headaches, nasal congestion or tinnitus  Eyes: no blurred or visual changes  Neck: No stiffness or swollen nodes  Respiratory: negative for - cough, hemoptysis, shortness of breath or wheezing  Cardiovascular : negative for - chest pain, edema, palpitations or shortness of breath  Gastrointestinal: negative for - abdominal pain, blood in stools, heartburn or nausea/vomiting  Genito-Urinary: no dysuria, trouble voiding, or hematuria  Musculoskeletal: negative for - gait disturbance, joint pain, joint stiffness or joint swelling  Neurological: no TIA or stroke symptoms  Hematologic: no bruises, no bleeding  Lymphatic: no swollen glands  Integument: no lumps, mole changes, nail changes or rash  Endocrine:no malaise/lethargy poly uria or polydipsia or unexpected weight changes        Social History     Socioeconomic History    Marital status:    Tobacco Use    Smoking status: Never Smoker    Smokeless tobacco: Never Used   Vaping Use    Vaping Use: Never used   Substance and Sexual Activity    Alcohol use: No    Drug use: No    Sexual activity: Never   Social History Narrative    Lives in [facility] alone. Has one son in [place] and one daughter in [facility]. . Used to work as an  for [employer] of education.        Family History   Problem Relation Age of Onset    Heart Disease Mother     Kidney Disease Mother     Lung Disease Mother         COPD    Diabetes Brother     Pacemaker Brother     Kidney Disease Brother     Hypertension Brother     Anesth Problems Neg Hx        OBJECTIVE:     Visit Vitals  /88   Pulse 91   Temp 98.7 °F (37.1 °C) (Oral)   Resp 19   Ht 5' 3\" (1.6 m)   Wt 194 lb 4.8 oz (88.1 kg)   SpO2 93%   BMI 34.42 kg/m²     CONSTITUTIONAL:   well nourished, appears age appropriate  EYES: sclera anicteric, PERRL, EOMI  ENMT:nares clear, moist mucous membranes, pharynx clear  NECK: supple. Thyroid normal, No JVD or bruits  RESPIRATORY: Chest: clear to ascultation and percussion, normal inspiratory effort  CARDIOVASCULAR: Heart: regular rate and rhythm no murmurs, rubs or gallops, PMI not displaced, No thrills, no peripheral edema  GASTROINTESTINAL: Abdomen: non distended, soft, non tender, bowel sounds normal  HEMATOLOGIC: no purpura, petechiae or bruising  LYMPHATIC: No lymph node enlargemant  MUSCULOSKELETAL: Extremities: no active synovitis, pulse 1+   INTEGUMENT: No unusual rashes or suspicious skin lesions noted. Nails appear normal.  PERIPHERAL VASCULAR: normal pulses femoral, PT and DP  NEUROLOGIC: non-focal exam, A & O X 3  PSYCHIATRIC:, appropriate affect     ASSESSMENT:   1. Hypertension with renal disease    2. Controlled type 2 diabetes mellitus with stage 2 chronic kidney disease, with long-term current use of insulin ([address]    3. Mixed hyperlipidemia    4. Primary osteoarthritis involving multiple joints    5. CKD (chronic kidney disease), stage II    6. Interstitial lung disease ([address]    7. Chronic hypoxemic respiratory failure (HCC)    8. Class 1 obesity due to excess calories without serious comorbidity with body mass index (BMI) of 33.0 to 33.9 in adult      Impression  1. Hypertension is controlled so continue current therapy. 2.  Diabetes repeat status pending and prior lab reviewed  3. Hyperlipidemia prior lab reviewed repeat status pending  4. DJD stable  5. CKD repeat status pending  6. Interstitial lung disease O2 sat today 92% room air  7. Chronic hypoxemic respiratory failure stable  8 obesity we discussed diet, exercise weight reduction for overall health benefit. Benign anxiety depression improved  Follow-up 3 months or sooner if there is a problem. I will call with lab results in interim.     PLAN:  .  Orders Placed This Encounter    METABOLIC PANEL, COMPREHENSIVE    LIPID PANEL    CK    HEMOGLOBIN A1C WITH EAG         ATTENTION:   This medical record was transcribed using an electronic medical records system. Although proofread, it may and can contain electronic and spelling errors. Other human spelling and other errors may be present. Corrections may be executed at a later time. Please feel free to contact us for any clarifications as needed. Follow-up and Dispositions    · Return in about 3 months (around 10/15/2022). No results found for any visits on 07/15/22. Windy Stein MD    The patient verbalized understanding of the problems and plans as explained.

## 2022-07-15 NOTE — PATIENT INSTRUCTIONS

## 2022-07-15 NOTE — PROGRESS NOTES
Chief Complaint   Patient presents with    Hypertension     3 month follow up    Diabetes     Visit Vitals  BP (!) 150/94 (BP 1 Location: Left upper arm, BP Patient Position: Sitting, BP Cuff Size: Adult)   Pulse 91   Temp 98.7 °F (37.1 °C) (Oral)   Resp 19   Ht 5' 3\" (1.6 m)   Wt 194 lb 4.8 oz (88.1 kg)   SpO2 93%   BMI 34.42 kg/m²     1. Have you been to the ER, urgent care clinic since your last visit? Hospitalized since your last visit? No    2. Have you seen or consulted any other health care providers outside of the [address] [place] [address] since your last visit? Include any pap smears or colon screening.  No

## 2022-07-19 NOTE — PROGRESS NOTES
Potassium was 3.4 so increase potassium supplement to 20 mEq daily. Glycohemoglobin markedly elevated.   I think she is still seeing Dr. Ortiz Dela Cruz for her diabetes so if that is the case send him a copy of this so he can make adjustments

## 2022-07-21 NOTE — TELEPHONE ENCOUNTER
PCP: Alie Benitez MD    Last appt: 7/15/2022  Future Appointments   Date Time Provider Gwendolyn Loving   2022  8:50 AM Mary Aldridge MD RDE [facility] [address] BS AMB   10/17/2022 10:40 AM Alie Benitez MD PCA BS AMB       Requested Prescriptions     Pending Prescriptions Disp Refills    insulin NPH (HUMULIN N) 100 unit/mL (3 mL) inpn 3 Pen 5     Si U daily while on 30 mg of prednisone dose--Dose change 22--updated med list--did not send prescription to the pharmacy         Other Comments:

## 2022-07-26 ENCOUNTER — OFFICE VISIT (OUTPATIENT)
Dept: INTERNAL MEDICINE CLINIC | Age: 77
End: 2022-07-26
Payer: MEDICARE

## 2022-07-26 ENCOUNTER — TELEPHONE (OUTPATIENT)
Dept: INTERNAL MEDICINE CLINIC | Age: 77
End: 2022-07-26

## 2022-07-26 VITALS
BODY MASS INDEX: 34.75 KG/M2 | RESPIRATION RATE: 18 BRPM | OXYGEN SATURATION: 95 % | DIASTOLIC BLOOD PRESSURE: 82 MMHG | HEART RATE: 100 BPM | WEIGHT: 196.1 LBS | TEMPERATURE: 98.3 F | SYSTOLIC BLOOD PRESSURE: 128 MMHG | HEIGHT: 63 IN

## 2022-07-26 DIAGNOSIS — J84.9 INTERSTITIAL LUNG DISEASE (HCC): ICD-10-CM

## 2022-07-26 DIAGNOSIS — G62.9 NEUROPATHY: ICD-10-CM

## 2022-07-26 DIAGNOSIS — J96.11 CHRONIC HYPOXEMIC RESPIRATORY FAILURE (HCC): ICD-10-CM

## 2022-07-26 DIAGNOSIS — R07.9 CHEST PAIN, UNSPECIFIED TYPE: Primary | ICD-10-CM

## 2022-07-26 PROBLEM — H61.21 IMPACTED CERUMEN OF RIGHT EAR: Status: RESOLVED | Noted: 2019-09-10 | Resolved: 2022-07-26

## 2022-07-26 PROBLEM — H66.002 NON-RECURRENT ACUTE SUPPURATIVE OTITIS MEDIA OF LEFT EAR WITHOUT SPONTANEOUS RUPTURE OF TYMPANIC MEMBRANE: Status: RESOLVED | Noted: 2019-09-10 | Resolved: 2022-07-26

## 2022-07-26 PROCEDURE — 99215 OFFICE O/P EST HI 40 MIN: CPT | Performed by: INTERNAL MEDICINE

## 2022-07-26 PROCEDURE — 1123F ACP DISCUSS/DSCN MKR DOCD: CPT | Performed by: INTERNAL MEDICINE

## 2022-07-26 PROCEDURE — 93000 ELECTROCARDIOGRAM COMPLETE: CPT | Performed by: INTERNAL MEDICINE

## 2022-07-26 PROCEDURE — G8536 NO DOC ELDER MAL SCRN: HCPCS | Performed by: INTERNAL MEDICINE

## 2022-07-26 PROCEDURE — 1090F PRES/ABSN URINE INCON ASSESS: CPT | Performed by: INTERNAL MEDICINE

## 2022-07-26 PROCEDURE — G9717 DOC PT DX DEP/BP F/U NT REQ: HCPCS | Performed by: INTERNAL MEDICINE

## 2022-07-26 PROCEDURE — G8427 DOCREV CUR MEDS BY ELIG CLIN: HCPCS | Performed by: INTERNAL MEDICINE

## 2022-07-26 PROCEDURE — G8754 DIAS BP LESS 90: HCPCS | Performed by: INTERNAL MEDICINE

## 2022-07-26 PROCEDURE — 1101F PT FALLS ASSESS-DOCD LE1/YR: CPT | Performed by: INTERNAL MEDICINE

## 2022-07-26 PROCEDURE — G8752 SYS BP LESS 140: HCPCS | Performed by: INTERNAL MEDICINE

## 2022-07-26 PROCEDURE — 3017F COLORECTAL CA SCREEN DOC REV: CPT | Performed by: INTERNAL MEDICINE

## 2022-07-26 PROCEDURE — G8399 PT W/DXA RESULTS DOCUMENT: HCPCS | Performed by: INTERNAL MEDICINE

## 2022-07-26 PROCEDURE — G8417 CALC BMI ABV UP PARAM F/U: HCPCS | Performed by: INTERNAL MEDICINE

## 2022-07-26 RX ORDER — GABAPENTIN 100 MG/1
100 CAPSULE ORAL 3 TIMES DAILY
Qty: 90 CAPSULE | Refills: 5 | Status: SHIPPED | OUTPATIENT
Start: 2022-07-26

## 2022-07-26 RX ORDER — POTASSIUM CHLORIDE 20 MEQ/1
20 TABLET, EXTENDED RELEASE ORAL 3 TIMES DAILY
Qty: 270 TABLET | Refills: 3 | Status: SHIPPED | OUTPATIENT
Start: 2022-07-26

## 2022-07-26 NOTE — PROGRESS NOTES
Emigdio Frias is a 78 y.o. female     Chief Complaint   Patient presents with    Chest Pain     Chest pain started yesterday. Visit Vitals  /82 (BP 1 Location: Left arm, BP Patient Position: Sitting, BP Cuff Size: Adult)   Pulse 100   Temp 98.3 °F (36.8 °C) (Oral)   Resp 18   Ht 5' 3\" (1.6 m)   Wt 196 lb 1.6 oz (89 kg)   SpO2 95%   BMI 34.74 kg/m²       Health Maintenance Due   Topic Date Due    COVID-19 Vaccine (1) Never done    DTaP/Tdap/Td series (1 - Tdap) Never done    Shingrix Vaccine Age 50> (1 of 2) Never done         1. \"Have you been to the ER, urgent care clinic since your last visit? Hospitalized since your last visit? \" No    2. \"Have you seen or consulted any other health care providers outside of the [address] [place] [address] since your last visit? \" No     3. For patients aged 39-70: Has the patient had a colonoscopy / FIT/ Cologuard? Yes - no Care Gap present      If the patient is female:    4. For patients aged 41-77: Has the patient had a mammogram within the past 2 years? Yes - no Care Gap present      5. For patients aged 21-65: Has the patient had a pap smear?  NA - based on age or sex

## 2022-07-26 NOTE — PROGRESS NOTES
Subjective:   Emigdio Frias is a 78 y.o. female      Chief Complaint   Patient presents with    Chest Pain     Chest pain started yesterday. History of present illness: She presents today for evaluation of a severe episode of chest pain that occurred yesterday lasting about 30 minutes. This pain was described as a sharp knifelike pain that involves her entire chest going through to her back. It was all in the torso area but nothing in abdominal area. She noted that the pain lasted for at least 30 minutes and she actually took a oxycodone she had went to sleep and the pain had resolved when she awakened. She denied any associated nausea, vomiting, diaphoresis, lightheadedness, dizziness, increase of her chronic shortness of breath or other cardiac or respiratory complaints. She does have a chronic shortness of breath from her chronic interstitial lung disease with her chronic hypoxemic respiratory failure and O2 sat today is actually 95% on room air. She denies any headaches, dizziness or neurologic complaints. She notes no GI or  complaints and there are no other complaints noted on complete review of systems. She has had none of the chest pain today and no other associated symptoms feel like she is back to her normal chronic dyspneic state today.     Patient Active Problem List   Diagnosis Code    Controlled type 2 diabetes mellitus with stage 2 chronic kidney disease, with long-term current use of insulin ([facility]) E11.22, N18.2, Z79.4    Non-seasonal allergic rhinitis J30.89    Class 1 obesity due to excess calories without serious comorbidity with body mass index (BMI) of 33.0 to 33.9 in adult E66.09, Z68.33    Rosacea L71.9    Mixed hyperlipidemia E78.2    Anxiety F41.9    GI bleed K92.2    Overactive bladder N32.81    Polymyalgia rheumatica ([facility]) M35.3    IBS (irritable bowel syndrome) K58.9    Hypertension with renal disease I12.9    CKD (chronic kidney disease), stage II N18.2    Avascular necrosis of hip ([facility]) M87.059    Abnormal LFTs R79.89    Vitamin D deficiency E55.9    Recurrent depression ([facility] F33.9    Primary osteoarthritis involving multiple joints M15.9    Sleep disturbance G47.9    Lumbar disc disease M51.9    Spinal stenosis, lumbar M48.061    Spinal stenosis, cervical region M48.02    Cervical stenosis of spine M48.02    Interstitial lung disease ([facility]) J84.9    Acute bronchitis J20.9    Gait instability R26.81    Glossitis K14.0    Alcohol screening Z13.39    Primary osteoarthritis of right shoulder M19.011    Acute non-recurrent maxillary sinusitis J01.00    Tremor R25.1    Chronic midline low back pain without sciatica M54.50, G89.29    Chronic hypoxemic respiratory failure ([facility]) J96.11    Chest pain R07.9    Neuropathy G62.9      Past Medical History:   Diagnosis Date    Abnormal LFTs 08/24/2017    FATTY LIVER    Arthritis     OSTEO    Avascular necrosis of hip ([facility] 08/24/2017    BILAT HIPS    Breast CA ([facility] 1989, 2001    BILATERAL; SURGERY, CHEMO    Chronic pain     CKD (chronic kidney disease), stage II 8/24/2017    Coagulation disorder ([facility] 1984    ITP  (DR [facility] BRADSHAW) - PLATELETS DROPPED TO 5K    Depression     Diabetes ([address] 2012    TYPE 2; NIDDM    DJD (degenerative joint disease), multiple sites 8/24/2017    GI bleed 2008    HEMORRHOIDS    Hyperlipemia     Hypertension with renal disease 8/24/2017    IBS (irritable bowel syndrome) 8/24/2017    Ill-defined condition 2015    PNEUMONIA X2 - HOSPITALIZED IN 2015    Interstitial lung disease ([facility] 04/01/2019    Liver disease     FATTY LIVER    Myalgia 8/24/2017    On statin therapy 8/24/2017    Overactive bladder 8/24/2017    Pneumonia 04/2015    HOSPITALIZED 3 WEEKS.     Polymyalgia rheumatica (HCC) 8/24/2017    Prophylactic antibiotic 8/24/2017    Reflex abnormality     acid reflex    Rosacea       Allergies   Allergen Reactions    Metformin Diarrhea    Statins-Hmg-Coa Reductase Inhibitors Myalgia     Myalgia with  multiple statins  Takes pravachol     Codeine Rash     Rash on thighs    Darvon [Propoxyphene] Other (comments)     \"I see worms\"    Pcn [Penicillins] Rash    Sulfa (Sulfonamide Antibiotics) Rash      Family History   Problem Relation Age of Onset    Heart Disease Mother     Kidney Disease Mother     Lung Disease Mother         COPD    Diabetes Brother     Pacemaker Brother     Kidney Disease Brother     Hypertension Brother     Anesth Problems Neg Hx       Social History     Socioeconomic History    Marital status:      Spouse name: Not on file    Number of children: Not on file    Years of education: Not on file    Highest education level: Not on file   Occupational History    Not on file   Tobacco Use    Smoking status: Never    Smokeless tobacco: Never   Vaping Use    Vaping Use: Never used   Substance and Sexual Activity    Alcohol use: No    Drug use: No    Sexual activity: Never   Other Topics Concern    Not on file   Social History Narrative    Lives in [place] alone. Has one son in [place] and one daughter in [place]. . Used to work as an  for [employer] of education. Social Determinants of Health     Financial Resource Strain: Low Risk     Difficulty of Paying Living Expenses: Not hard at all   Food Insecurity: No Food Insecurity    Worried About Running Out of Food in the Last Year: Never true    Ran Out of Food in the Last Year: Never true   Transportation Needs: Not on file   Physical Activity: Not on file   Stress: Not on file   Social Connections: Not on file   Intimate Partner Violence: Not on file   Housing Stability: Not on file     Prior to Admission medications    Medication Sig Start Date End Date Taking? Authorizing Provider   potassium chloride (K-DUR, KLOR-CON M20) 20 mEq tablet Take 1 Tablet by mouth three (3) times daily. 7/26/22  Yes Garett Bolanos MD   gabapentin (NEURONTIN) 100 mg capsule Take 1 Capsule by mouth three (3) times daily.  Max Daily Amount: 300 mg. 7/26/22  Yes Madi Johnson MD   insulin NPH (HUMULIN N) 100 unit/mL (3 mL) inpn 65 U daily while on 30 mg of prednisone dose--Dose change 07/02/22--updated med list--did not send prescription to the pharmacy 7/22/22  Yes Lizbeth Lawson MD   carvediloL (COREG) 6.25 mg tablet TAKE 1 TABLET TWICE A DAY 7/11/22  Yes Madi Johnson MD   [place] 200-5 mcg/actuation HFA inhaler Take 2 Puffs by inhalation two (2) times a day. 5/20/22  Yes Provider, Historical   sertraline (ZOLOFT) 100 mg tablet TAKE 2 TABLETS DAILY 6/20/22  Yes Madi Johnson MD   LORazepam (ATIVAN) 1 mg tablet Take 1 Tablet by mouth every eight (8) hours as needed for Anxiety. Max Daily Amount: 3 mg. 6/20/22  Yes Madi Johnson MD   Insulin Needles, Disposable, (BD Ultra-Fine Short Pen Needle) 31 gauge x 5/16\" ndle USE UNDER THE SKIN DAILY. USE WITH LANTUS FLEX PEN DAILY 6/20/22  Yes Madi Johnson MD   glucose blood VI test strips (True Metrix Glucose Test Strip) strip USE ONCE DAILY AND RECORD RESULTS IN A NOTEBOOK ALSO RECORD LAST MEALTIME IN NOTEBOOK 6/20/22  Yes Madi Johnson MD   insulin glargine (Lantus Solostar U-100 Insulin) 100 unit/mL (3 mL) inpn INJECT 45 UNITS IN THE MORNING AND 36 UNITS AT NIGHT--Dose change 06/13/22--updated med list--did not send prescription to the pharmacy 6/13/22  Yes Ulyses Romberg, MD   primidone (Mysoline) 50 mg tablet Take 0.5 Tablets by mouth two (2) times a day. 5/24/22  Yes Madi Johnson MD   Ofev 150 mg cap two (2) times a day.  3/16/22  Yes Provider, Historical   predniSONE (DELTASONE) 10 mg tablet Take 3 tablets daily-Dose change 05/03/22--updated med list--did not send prescription to the pharmacy 5/3/22  Yes Ulyses Romberg, MD   benzonatate (TESSALON) 200 mg capsule TAKE 1 CAPSULE BY MOUTH THREE TIMES DAILY AS NEEDED FOR COUGH - SWALLOW CAPSULE WHOLE (DO NOT CRUSH) 4/18/22  Yes Madi Johnson MD   hydroCHLOROthiazide (HYDRODIURIL) 25 mg tablet TAKE 1 TABLET DAILY FOR FLUID AND BLOOD PRESSURE 3/31/22  Yes Cy Pena MD   albuterol (ProAir HFA) 90 mcg/actuation inhaler Take 1 Puff by inhalation every four (4) hours as needed for Wheezing or Shortness of Breath. 12/16/21  Yes Cy Pena MD   rosuvastatin (CRESTOR) 20 mg tablet Take 1 Tablet by mouth nightly. 10/21/21  Yes Cy Pena MD   buPROPion XL (WELLBUTRIN XL) 150 mg tablet TAKE 1 TABLET DAILY 9/14/21  Yes Cy Pena MD   fenofibrate nanocrystallized (TRICOR) 145 mg tablet Take 1 Tablet by mouth daily. 6/14/21  Yes Cy Pena MD   clemastine 2.68 mg tab tablet Take 2.68 mg by mouth two (2) times a day. Yes Provider, Historical   pantoprazole (PROTONIX) 40 mg tablet Take 40 mg by mouth daily. 11/21/19  Yes Provider, Historical   montelukast sodium (SINGULAIR PO) Take 10 mg by mouth nightly. 10 mg tab   Yes Provider, Historical   clindamycin (CLEOCIN) 300 mg capsule Take 2 capsules 1 hour before dental procedure  Patient not taking: Reported on 7/26/2022 8/13/20   Cy Pena MD        Review of Systems              Constitutional:  She denies fever, weight loss, sweats or fatigue. EYES: No blurred or double vision,               ENT: no nasal congestion, no headache or dizziness. No difficulty with               swallowing, taste, speech or smell. Respiratory:  No cough, wheezing or shortness of breath. No sputum production. Cardiac: Positive for chest pain yesterday as described above without associated symptoms and without palpitations, unexplained indigestion, syncope, edema, PND or orthopnea. GI:  No changes in bowel movements, no abdominal pain, no bloating, anorexia, nausea, vomiting or heartburn. :  No frequency or dysuria. Denies incontinence or sexual dysfunction. Extremities:  No joint pain, stiffness or swelling  Back:.no pain or soreness  Skin:  No recent rashes or mole changes.   Neurological:  No numbness, tingling, burning paresthesias or loss of motor strength. No syncope, dizziness, frequent headaches or memory loss. Positive increased burning neuropathy type symptoms lower extremities  Hematologic:  No easy bruising  Lymphatic: No lymph node enlargement    Objective:     Vitals:    07/26/22 1451   BP: 128/82   Pulse: 100   Resp: 18   Temp: 98.3 °F (36.8 °C)   TempSrc: Oral   SpO2: 95%   Weight: 196 lb 1.6 oz (89 kg)   Height: 5' 3\" (1.6 m)   PainSc:   3   PainLoc: Back       Body mass index is 34.74 kg/m². Physical Examination:              General Appearance:  Well-developed, well-nourished, no acute distress. HEENT:      Ears:  The TMs and ear canals were clear. Eyes:  The pupillary responses were normal.  Extraocular muscle function intact. Lids and conjunctiva not injected. Funduscopic exam revealed sharp disc margins. Nares: Clear w/o edema or erythema  Pharynx:  Clear with teeth in good repair. No masses were noted. Neck:  Supple without thyromegaly or adenopathy. No JVD noted. No carotid                bruits. Lungs:  Clear to auscultation and percussion. Chest wall: No tenderness to palpation  Cardiac:  Regular rate and rhythm without murmur. PMI not displaced. No gallop, rub or click. Abdominal: Soft, non-tender, no hepata-spleenomegally or masses  Extremities:  No clubbing, cyanosis or edema. Skin:  No rash or unusual mole changes noted. Lymph Nodes:  None felt in the cervical, supraclavicular, axillary or inguinal region. Neurological: . DTRs 2+ and symmetric. Station and gait normal.   Hematologic:   No purpura or petechiae        Assessment/Plan:         1. Chest pain, unspecified type    2. Interstitial lung disease ([address]    3. Chronic hypoxemic respiratory failure (HCC)    4. Neuropathy        Impressions/Plan:  Impression  1. Chest pain this is unclear etiology with this could be cardiac or not.   Certainly cardiac's consideration is high on the differential.  EKG obtained today reveals a right bundle branch block with a baseline artifact but no significant change from prior EKG which I reviewed today while she was here in the office. I will go ahead and send cardiac enzymes but since she has had no pain within the last 22 hours I do not see reason to admit her to the hospital based upon EKG. Chest x-ray obtained shows no evidence of any intrathoracic/pulmonary cause for chest pain  2. Interstitial lung disease seems stable and O2 sat today is good on room air  3. Chronic respiratory failure as noted O2 sat good  4. Neuropathy I have discussed Neurontin with her and we will go ahead and start with 100 mg 3 times daily. I did warn her of potential drowsiness associated with that. High complexity patient. High complexity decision making. 45 minutes spent in direct care of this patient today were greater than 50% in counseling coordination of care. Orders Placed This Encounter    XR CHEST PA LAT    CBC WITH AUTOMATED DIFF    CK    TROPONIN-HIGH SENSITIVITY    AMB POC EKG ROUTINE W/ 12 LEADS, INTER & REP    gabapentin (NEURONTIN) 100 mg capsule       Follow-up and Dispositions    Return TBD. No results found for any visits on 07/26/22. Adolfo Christianson MD    The patient was given after the visit summary the patient verbalized an understanding of the plans and problems as explained.

## 2022-07-27 LAB
BASOPHILS # BLD: 0.1 K/UL (ref 0–0.1)
BASOPHILS NFR BLD: 1 % (ref 0–1)
CK SERPL-CCNC: 129 U/L (ref 26–192)
DIFFERENTIAL METHOD BLD: ABNORMAL
EOSINOPHIL # BLD: 0.1 K/UL (ref 0–0.4)
EOSINOPHIL NFR BLD: 1 % (ref 0–7)
ERYTHROCYTE [DISTWIDTH] IN BLOOD BY AUTOMATED COUNT: 15 % (ref 11.5–14.5)
HCT VFR BLD AUTO: 47.9 % (ref 35–47)
HGB BLD-MCNC: 16 G/DL (ref 11.5–16)
IMM GRANULOCYTES # BLD AUTO: 0 K/UL (ref 0–0.04)
IMM GRANULOCYTES NFR BLD AUTO: 0 % (ref 0–0.5)
LYMPHOCYTES # BLD: 0.8 K/UL (ref 0.8–3.5)
LYMPHOCYTES NFR BLD: 9 % (ref 12–49)
MCH RBC QN AUTO: 34.6 PG (ref 26–34)
MCHC RBC AUTO-ENTMCNC: 33.4 G/DL (ref 30–36.5)
MCV RBC AUTO: 103.7 FL (ref 80–99)
MONOCYTES # BLD: 0.5 K/UL (ref 0–1)
MONOCYTES NFR BLD: 6 % (ref 5–13)
NEUTS SEG # BLD: 6.9 K/UL (ref 1.8–8)
NEUTS SEG NFR BLD: 83 % (ref 32–75)
NRBC # BLD: 0 K/UL (ref 0–0.01)
NRBC BLD-RTO: 0 PER 100 WBC
PLATELET # BLD AUTO: 216 K/UL (ref 150–400)
PMV BLD AUTO: 10.7 FL (ref 8.9–12.9)
RBC # BLD AUTO: 4.62 M/UL (ref 3.8–5.2)
RBC MORPH BLD: ABNORMAL
RBC MORPH BLD: ABNORMAL
TROPONIN-HIGH SENSITIVITY: 29 NG/L (ref 0–51)
WBC # BLD AUTO: 8.4 K/UL (ref 3.6–11)

## 2022-08-01 DIAGNOSIS — F32.A DEPRESSION, UNSPECIFIED DEPRESSION TYPE: ICD-10-CM

## 2022-08-01 RX ORDER — SERTRALINE HYDROCHLORIDE 100 MG/1
TABLET, FILM COATED ORAL
Qty: 90 TABLET | Refills: 3 | Status: SHIPPED | OUTPATIENT
Start: 2022-08-01

## 2022-08-01 NOTE — TELEPHONE ENCOUNTER
RX refill request from the patient/pharmacy. Patient last seen 07- with labs, and next appt. scheduled for 10-  Requested Prescriptions     Pending Prescriptions Disp Refills    sertraline (ZOLOFT) 100 mg tablet [Pharmacy Med Name: SERTRALINE HCL TABS 100MG] 90 Tablet 3     Sig: TAKE 1 TABLET DAILY    .

## 2022-08-02 ENCOUNTER — VIRTUAL VISIT (OUTPATIENT)
Dept: ENDOCRINOLOGY | Age: 77
End: 2022-08-02
Payer: MEDICARE

## 2022-08-02 DIAGNOSIS — R80.9 TYPE 2 DIABETES MELLITUS WITH MICROALBUMINURIA, WITH LONG-TERM CURRENT USE OF INSULIN (HCC): Primary | ICD-10-CM

## 2022-08-02 DIAGNOSIS — I10 ESSENTIAL HYPERTENSION, BENIGN: ICD-10-CM

## 2022-08-02 DIAGNOSIS — Z79.4 TYPE 2 DIABETES MELLITUS WITH MICROALBUMINURIA, WITH LONG-TERM CURRENT USE OF INSULIN (HCC): Primary | ICD-10-CM

## 2022-08-02 DIAGNOSIS — E78.5 HYPERLIPIDEMIA LDL GOAL <100: ICD-10-CM

## 2022-08-02 DIAGNOSIS — E11.29 TYPE 2 DIABETES MELLITUS WITH MICROALBUMINURIA, WITH LONG-TERM CURRENT USE OF INSULIN (HCC): Primary | ICD-10-CM

## 2022-08-02 PROCEDURE — 1123F ACP DISCUSS/DSCN MKR DOCD: CPT | Performed by: INTERNAL MEDICINE

## 2022-08-02 PROCEDURE — 2022F DILAT RTA XM EVC RTNOPTHY: CPT | Performed by: INTERNAL MEDICINE

## 2022-08-02 PROCEDURE — G8756 NO BP MEASURE DOC: HCPCS | Performed by: INTERNAL MEDICINE

## 2022-08-02 PROCEDURE — G8399 PT W/DXA RESULTS DOCUMENT: HCPCS | Performed by: INTERNAL MEDICINE

## 2022-08-02 PROCEDURE — G8427 DOCREV CUR MEDS BY ELIG CLIN: HCPCS | Performed by: INTERNAL MEDICINE

## 2022-08-02 PROCEDURE — 3046F HEMOGLOBIN A1C LEVEL >9.0%: CPT | Performed by: INTERNAL MEDICINE

## 2022-08-02 PROCEDURE — 3017F COLORECTAL CA SCREEN DOC REV: CPT | Performed by: INTERNAL MEDICINE

## 2022-08-02 PROCEDURE — 1101F PT FALLS ASSESS-DOCD LE1/YR: CPT | Performed by: INTERNAL MEDICINE

## 2022-08-02 PROCEDURE — 1090F PRES/ABSN URINE INCON ASSESS: CPT | Performed by: INTERNAL MEDICINE

## 2022-08-02 PROCEDURE — G9717 DOC PT DX DEP/BP F/U NT REQ: HCPCS | Performed by: INTERNAL MEDICINE

## 2022-08-02 PROCEDURE — 99214 OFFICE O/P EST MOD 30 MIN: CPT | Performed by: INTERNAL MEDICINE

## 2022-08-02 NOTE — PROGRESS NOTES
Chief Complaint   Patient presents with    Diabetes     [facility]    Other     PCP and pharmacy confirmed       **THIS IS A VIRTUAL VISIT VIA A VIDEO SYNCHRONOUS DISCUSSION. PATIENT AGREED TO HAVE THEIR CARE DELIVERED OVER A [employer] VIDEO VISIT IN PLACE OF THEIR REGULARLY SCHEDULED OFFICE VISIT**    History of Present Illness: Nunu Ag is a 78 y.o. female here for follow up of diabetes. We have been in touch over [employer] since last visit in 4/22 and have adjusted her lantus to 45 units in the morning and 36 units at night and her NPH to 65 units in the morning to match the 30 mg of prednisone she is taking. States she doesn't think she can function with a dose of prednisone lower than 30 mg so likely will stay on this dose. She just sent me her readings from July through [place] and she has many days when her fasting sugar is in the  range and her bedtime reading is staying under 180. However, she also has plenty of readings where her sugar goes into the high 200s during the day based on the amount of carbs she is eating. She is trying to cut back on bread intake to help with this. Compliant with her BP and lipid regimen. Just started on gabapentin 100 mg tid by Dr. Juan José Pop last week for neuropathy. Current Outpatient Medications   Medication Sig    zinc 50 mg tab tablet Take  by mouth daily. docosahexaenoic acid/epa (FISH OIL PO) Take  by mouth. sertraline (ZOLOFT) 100 mg tablet TAKE 1 TABLET DAILY    potassium chloride (K-DUR, KLOR-CON M20) 20 mEq tablet Take 1 Tablet by mouth three (3) times daily. gabapentin (NEURONTIN) 100 mg capsule Take 1 Capsule by mouth three (3) times daily.  Max Daily Amount: 300 mg.    insulin NPH (HUMULIN N) 100 unit/mL (3 mL) inpn 65 U daily while on 30 mg of prednisone dose--Dose change 07/02/22--updated med list--did not send prescription to the pharmacy    carvediloL (COREG) 6.25 mg tablet TAKE 1 TABLET TWICE A DAY    Dulera 200-5 mcg/actuation HFA inhaler Take 2 Puffs by inhalation two (2) times a day. LORazepam (ATIVAN) 1 mg tablet Take 1 Tablet by mouth every eight (8) hours as needed for Anxiety. Max Daily Amount: 3 mg. Insulin Needles, Disposable, (BD Ultra-Fine Short Pen Needle) 31 gauge x 5/16\" ndle USE UNDER THE SKIN DAILY. USE WITH LANTUS FLEX PEN DAILY    glucose blood VI test strips (True Metrix Glucose Test Strip) strip USE ONCE DAILY AND RECORD RESULTS IN A NOTEBOOK ALSO RECORD LAST MEALTIME IN NOTEBOOK    insulin glargine (Lantus Solostar U-100 Insulin) 100 unit/mL (3 mL) inpn INJECT 45 UNITS IN THE MORNING AND 36 UNITS AT NIGHT--Dose change 06/13/22--updated med list--did not send prescription to the pharmacy    primidone (Mysoline) 50 mg tablet Take 0.5 Tablets by mouth two (2) times a day. Ofev 150 mg cap two (2) times a day. predniSONE (DELTASONE) 10 mg tablet Take 3 tablets daily-Dose change 05/03/22--updated med list--did not send prescription to the pharmacy    benzonatate (TESSALON) 200 mg capsule TAKE 1 CAPSULE BY MOUTH THREE TIMES DAILY AS NEEDED FOR COUGH - SWALLOW CAPSULE WHOLE (DO NOT CRUSH)    hydroCHLOROthiazide (HYDRODIURIL) 25 mg tablet TAKE 1 TABLET DAILY FOR FLUID AND BLOOD PRESSURE    albuterol (ProAir HFA) 90 mcg/actuation inhaler Take 1 Puff by inhalation every four (4) hours as needed for Wheezing or Shortness of Breath. rosuvastatin (CRESTOR) 20 mg tablet Take 1 Tablet by mouth nightly. buPROPion XL (WELLBUTRIN XL) 150 mg tablet TAKE 1 TABLET DAILY    fenofibrate nanocrystallized (TRICOR) 145 mg tablet Take 1 Tablet by mouth daily. clemastine 2.68 mg tab tablet Take 2.68 mg by mouth two (2) times a day. pantoprazole (PROTONIX) 40 mg tablet Take 40 mg by mouth daily. montelukast sodium (SINGULAIR PO) Take 10 mg by mouth nightly.  10 mg tab    clindamycin (CLEOCIN) 300 mg capsule Take 2 capsules 1 hour before dental procedure (Patient not taking: No sig reported)     No current facility-administered medications for this visit. Allergies   Allergen Reactions    Metformin Diarrhea    Statins-Hmg-Coa Reductase Inhibitors Myalgia     Myalgia with  multiple statins  Takes pravachol     Codeine Rash     Rash on thighs    Darvon [Propoxyphene] Other (comments)     \"I see worms\"    Pcn [Penicillins] Rash    Sulfa (Sulfonamide Antibiotics) Rash     Review of Systems: PER HPI    Physical Examination:  - GENERAL: NCAT, Appears well nourished   - EYES: EOMI, non-icteric, no proptosis   - Ear/Nose/Throat: NCAT, no visible inflammation or masses   - CARDIOVASCULAR: no cyanosis, no visible JVD   - RESPIRATORY: respiratory effort normal without any distress or labored breathing   - MUSCULOSKELETAL: Normal ROM of neck and upper extremities observed   - SKIN: No rash on face  - NEUROLOGIC:  No facial asymmetry (Cranial nerve 7 motor function), No gaze palsy   - PSYCHIATRIC: Normal affect, Normal insight and judgement       Data Reviewed:   Component      Latest Ref Rng & Units 7/15/2022 7/15/2022 7/15/2022 7/15/2022          11:33 AM 11:33 AM 11:33 AM 11:33 AM   Sodium      136 - 145 mmol/L 142      Potassium      3.5 - 5.1 mmol/L 3.4 (L)      Chloride      97 - 108 mmol/L 106      CO2      21 - 32 mmol/L 29      Anion gap      5 - 15 mmol/L 7      Glucose      65 - 100 mg/dL 116 (H)      BUN      6 - 20 MG/DL 27 (H)      Creatinine      0.55 - 1.02 MG/DL 0.79      BUN/Creatinine ratio      12 - 20   34 (H)      GFR est AA      >60 ml/min/1.73m2 >60      GFR est non-AA      >60 ml/min/1.73m2 >60      Calcium      8.5 - 10.1 MG/DL 9.3      Bilirubin, total      0.2 - 1.0 MG/DL 0.7      ALT      12 - 78 U/L 124 (H)      AST      15 - 37 U/L 75 (H)      Alk.  phosphatase      45 - 117 U/L 198 (H)      Protein, total      6.4 - 8.2 g/dL 6.8      Albumin      3.5 - 5.0 g/dL 3.8      Globulin      2.0 - 4.0 g/dL 3.0      A-G Ratio      1.1 - 2.2   1.3      Cholesterol, total      <200 MG/DL  195     Triglyceride      <150 MG/DL 201 (H)     HDL Cholesterol      MG/DL  76     LDL, calculated      0 - 100 MG/DL  78.8     VLDL, calculated      MG/DL  40.2     CHOL/HDL Ratio      0.0 - 5.0    2.6     Hemoglobin A1c, (calculated)      4.0 - 5.6 %    9.6 (H)   Est. average glucose      mg/dL    229   CK      26 - 192 U/L   70        Assessment/Plan:     1. Type 2 diabetes mellitus with microalbuminuria, with long-term current use of insulin ([facility]): her most recent Hgb A1c was 9.6% in 7/22 down from 11.1% in 4/22 and all values have been over 9% since 2020. Her lung disease is being managed with chronic high dose prednisone and current dose of NPH seems to be covering the 30 mg of prednisone and her lantus is controlling her basal insulin needs provided she doesn't eat too many carbs so did not make any changes. - cont lantus 45 units in the morning and 36 units at night  - cont NPH 65 units at the same time as 30 mg of prednisone  - check bs 4 times per day due to fluctuating sugars  - foot exam done 4/22  - microalbumin 83 in 4/22  - optho UTD 10/21  - check A1c in 10/22 with PCP        2. Essential hypertension, benign: her BP was at goal < 140/90 at PCP's office last week  - cont current regimen        3. Hyperlipidemia LDL goal <100: LDL 97 and  in 4/22 on crestor 20 mg daily. LDL 78 and  in 7/22.  - cont crestor 20 mg daily   - check lipids in 10/22 with PCP     Follow-up and Dispositions    Return 10/20/22 at 10:30am.           Sweetie Nicole, was evaluated through a synchronous (real-time) audio-video encounter. The patient (or guardian if applicable) is aware that this is a billable service, which includes applicable co-pays. This Virtual Visit was conducted with patient's (and/or legal guardian's) consent. The visit was conducted pursuant to the emergency declaration under the [facility], [address] [place] [address] authority and the [employer] and [employer] Act. Patient identification was verified, and a caregiver was present when appropriate. The patient was located at: Home: [place] [phone]  The provider was located at: Facility (Appt Department): [address] [facility]          --Doug Powell MD on 8/2/2022 at 8:54 AM    An electronic signature was used to authenticate this note.       Copy sent to:  Mini Jonas MD as PCP - General (Internal Medicine)  Theresa Monroe MD (Pulmonary Disease)

## 2022-08-12 ENCOUNTER — OFFICE VISIT (OUTPATIENT)
Dept: ORTHOPEDIC SURGERY | Age: 77
End: 2022-08-12
Payer: MEDICARE

## 2022-08-12 VITALS — WEIGHT: 196 LBS | BODY MASS INDEX: 34.73 KG/M2 | HEIGHT: 63 IN

## 2022-08-12 DIAGNOSIS — E11.22 CONTROLLED TYPE 2 DIABETES MELLITUS WITH STAGE 2 CHRONIC KIDNEY DISEASE, WITH LONG-TERM CURRENT USE OF INSULIN (HCC): Primary | ICD-10-CM

## 2022-08-12 DIAGNOSIS — Z96.641 STATUS POST RIGHT HIP REPLACEMENT: ICD-10-CM

## 2022-08-12 DIAGNOSIS — Z79.4 CONTROLLED TYPE 2 DIABETES MELLITUS WITH STAGE 2 CHRONIC KIDNEY DISEASE, WITH LONG-TERM CURRENT USE OF INSULIN (HCC): Primary | ICD-10-CM

## 2022-08-12 DIAGNOSIS — N18.2 CONTROLLED TYPE 2 DIABETES MELLITUS WITH STAGE 2 CHRONIC KIDNEY DISEASE, WITH LONG-TERM CURRENT USE OF INSULIN (HCC): Primary | ICD-10-CM

## 2022-08-12 DIAGNOSIS — M54.16 LUMBAR RADICULOPATHY: Primary | ICD-10-CM

## 2022-08-12 DIAGNOSIS — Z96.642 STATUS POST LEFT HIP REPLACEMENT: ICD-10-CM

## 2022-08-12 PROCEDURE — G8427 DOCREV CUR MEDS BY ELIG CLIN: HCPCS | Performed by: ORTHOPAEDIC SURGERY

## 2022-08-12 PROCEDURE — 3017F COLORECTAL CA SCREEN DOC REV: CPT | Performed by: ORTHOPAEDIC SURGERY

## 2022-08-12 PROCEDURE — G8756 NO BP MEASURE DOC: HCPCS | Performed by: ORTHOPAEDIC SURGERY

## 2022-08-12 PROCEDURE — 1123F ACP DISCUSS/DSCN MKR DOCD: CPT | Performed by: ORTHOPAEDIC SURGERY

## 2022-08-12 PROCEDURE — 99213 OFFICE O/P EST LOW 20 MIN: CPT | Performed by: ORTHOPAEDIC SURGERY

## 2022-08-12 PROCEDURE — G8536 NO DOC ELDER MAL SCRN: HCPCS | Performed by: ORTHOPAEDIC SURGERY

## 2022-08-12 PROCEDURE — G8417 CALC BMI ABV UP PARAM F/U: HCPCS | Performed by: ORTHOPAEDIC SURGERY

## 2022-08-12 PROCEDURE — 1101F PT FALLS ASSESS-DOCD LE1/YR: CPT | Performed by: ORTHOPAEDIC SURGERY

## 2022-08-12 PROCEDURE — 1090F PRES/ABSN URINE INCON ASSESS: CPT | Performed by: ORTHOPAEDIC SURGERY

## 2022-08-12 PROCEDURE — G9717 DOC PT DX DEP/BP F/U NT REQ: HCPCS | Performed by: ORTHOPAEDIC SURGERY

## 2022-08-12 PROCEDURE — G8399 PT W/DXA RESULTS DOCUMENT: HCPCS | Performed by: ORTHOPAEDIC SURGERY

## 2022-08-12 NOTE — PROGRESS NOTES
Mynor Lester (: 1945) is a 78 y.o. female patient, here for evaluation of the following chief complaint(s):  Hip Pain (Right hip pain/)       ASSESSMENT/PLAN:  Below is the assessment and plan developed based on review of pertinent history, physical exam, labs, studies, and medications. 59-year-old female comes in today for evaluation of right hip pain. She is status post bilateral total hip replacements done in 2016 by Dr. Ayush Talbert. Postoperatively she has been doing very well in regards to these. Few weeks ago she did have a ground-level fall. X-rays obtained with her primary care. X-rays independently reviewed from outside facility show well fixed bilateral hip replacements. Overall alignment and fixation good. Denies any anterior groin pain. Her pain is in her buttock and noted to radiate down her leg. Most likely secondary to lumbar radiculopathy. Patient is already in physical therapy plans to continue for strengthening conditioning. Plans to monitor symptoms and follow-up as needed. 1. Lumbar radiculopathy  2. Status post right hip replacement  3. Status post left hip replacement      Encounter Diagnoses   Name Primary? Lumbar radiculopathy Yes    Status post right hip replacement     Status post left hip replacement         No follow-ups on file. SUBJECTIVE/OBJECTIVE:  Mynor Lester (: 1945) is a 78 y.o. female who presents today for the following:  Chief Complaint   Patient presents with    Hip Pain     Right hip pain         59-year-old female comes in today for evaluation of right hip buttock pain pain is noted to radiate down her leg. Rates pain as severe. Can only walk indoors. Uses a walker. This is baseline for her. No anterior groin pain. She is on chronic steroids for her interstitial lung disease. She is unable to take NSAIDs secondary to kidney disease. She does have a history of diabetes.     IMAGING:  XR Results (most recent):  Results from Hospital Encounter encounter on 07/26/22    XR CHEST PA LAT    Narrative  Exam:  2 view chest    Indication: 6/20/2022    PA and lateral views demonstrate normal heart size. There is no acute process in  the lung fields. The patient is status post fusion of the cervical and  thoracolumbar spine. Degenerative changes are seen throughout the thoracic spine  and in the shoulder joints bilaterally. Impression  Impression: No acute process. Allergies   Allergen Reactions    Metformin Diarrhea    Statins-Hmg-Coa Reductase Inhibitors Myalgia     Myalgia with  multiple statins  Takes pravachol     Codeine Rash     Rash on thighs    Darvon [Propoxyphene] Other (comments)     \"I see worms\"    Pcn [Penicillins] Rash    Sulfa (Sulfonamide Antibiotics) Rash       Current Outpatient Medications   Medication Sig    zinc 50 mg tab tablet Take  by mouth daily. docosahexaenoic acid/epa (FISH OIL PO) Take  by mouth. sertraline (ZOLOFT) 100 mg tablet TAKE 1 TABLET DAILY    potassium chloride (K-DUR, KLOR-CON M20) 20 mEq tablet Take 1 Tablet by mouth three (3) times daily. gabapentin (NEURONTIN) 100 mg capsule Take 1 Capsule by mouth three (3) times daily. Max Daily Amount: 300 mg.    insulin NPH (HUMULIN N) 100 unit/mL (3 mL) inpn 65 U daily while on 30 mg of prednisone dose--Dose change 07/02/22--updated med list--did not send prescription to the pharmacy    carvediloL (COREG) 6.25 mg tablet TAKE 1 TABLET TWICE A DAY    Dulera 200-5 mcg/actuation HFA inhaler Take 2 Puffs by inhalation two (2) times a day. LORazepam (ATIVAN) 1 mg tablet Take 1 Tablet by mouth every eight (8) hours as needed for Anxiety. Max Daily Amount: 3 mg. Insulin Needles, Disposable, (BD Ultra-Fine Short Pen Needle) 31 gauge x 5/16\" ndle USE UNDER THE SKIN DAILY.  USE WITH LANTUS FLEX PEN DAILY    glucose blood VI test strips (True Metrix Glucose Test Strip) strip USE ONCE DAILY AND RECORD RESULTS IN A NOTEBOOK ALSO RECORD LAST MEALTIME IN NOTEBOOK    insulin glargine (Lantus Solostar U-100 Insulin) 100 unit/mL (3 mL) inpn INJECT 45 UNITS IN THE MORNING AND 36 UNITS AT NIGHT--Dose change 06/13/22--updated med list--did not send prescription to the pharmacy    primidone (Mysoline) 50 mg tablet Take 0.5 Tablets by mouth two (2) times a day. Ofev 150 mg cap two (2) times a day. predniSONE (DELTASONE) 10 mg tablet Take 3 tablets daily-Dose change 05/03/22--updated med list--did not send prescription to the pharmacy    benzonatate (TESSALON) 200 mg capsule TAKE 1 CAPSULE BY MOUTH THREE TIMES DAILY AS NEEDED FOR COUGH - SWALLOW CAPSULE WHOLE (DO NOT CRUSH)    hydroCHLOROthiazide (HYDRODIURIL) 25 mg tablet TAKE 1 TABLET DAILY FOR FLUID AND BLOOD PRESSURE    albuterol (ProAir HFA) 90 mcg/actuation inhaler Take 1 Puff by inhalation every four (4) hours as needed for Wheezing or Shortness of Breath. rosuvastatin (CRESTOR) 20 mg tablet Take 1 Tablet by mouth nightly. buPROPion XL (WELLBUTRIN XL) 150 mg tablet TAKE 1 TABLET DAILY    fenofibrate nanocrystallized (TRICOR) 145 mg tablet Take 1 Tablet by mouth daily. clemastine 2.68 mg tab tablet Take 2.68 mg by mouth two (2) times a day. pantoprazole (PROTONIX) 40 mg tablet Take 40 mg by mouth daily. montelukast sodium (SINGULAIR PO) Take 10 mg by mouth nightly. 10 mg tab     No current facility-administered medications for this visit.        Past Medical History:   Diagnosis Date    Abnormal LFTs 08/24/2017    FATTY LIVER    Arthritis     OSTEO    Avascular necrosis of hip ([facility] [address] 08/24/2017    BILAT HIPS    Breast CA ([facility] [address] 1989, 2001    BILATERAL; SURGERY, CHEMO    Chronic pain     CKD (chronic kidney disease), stage II 8/24/2017    Coagulation disorder ([facility] [address] 1984    ITP  (DR CHU BRADSHAW) - PLATELETS DROPPED TO 5K    Depression     Diabetes ([facility] [address] 2012    TYPE 2; NIDDM    DJD (degenerative joint disease), multiple sites 8/24/2017    GI bleed 2008    HEMORRHOIDS    Hyperlipemia     Hypertension with renal disease 8/24/2017    IBS (irritable bowel syndrome) 8/24/2017    Ill-defined condition 2015    PNEUMONIA X2 - HOSPITALIZED IN 2015    Interstitial lung disease ([facility] [address] 04/01/2019    Liver disease     FATTY LIVER    Myalgia 8/24/2017    On statin therapy 8/24/2017    Overactive bladder 8/24/2017    Pneumonia 04/2015    HOSPITALIZED 3 WEEKS. Polymyalgia rheumatica ([facility] [address] 8/24/2017    Prophylactic antibiotic 8/24/2017    Reflex abnormality     acid reflex    Rosacea         Past Surgical History:   Procedure Laterality Date    HX APPENDECTOMY  1950    HX BREAST BIOPSY Bilateral     HX CATARACT REMOVAL Bilateral     HX COLONOSCOPY      HX ENDOSCOPY      HX GI      COLONOSCOPY    HX HEENT      WISDOM TEETH    HX HYSTERECTOMY  2000S    HX MASTECTOMY Right 1989    HX MASTECTOMY Left 2001    HX ORTHOPAEDIC  2008    LUMBAR FUSION    HX ORTHOPAEDIC  2018    RE-DO LUMBAR FUSION, AND ADDED THORACIC FUSION    HX ORTHOPAEDIC  03/26/2019    neckectomy    HX TUBAL LIGATION  1981    HX UROLOGICAL  2000s    BLADDER SLING    [place] BREAST SURGERY PROCEDURE UNLISTED  1993, 2002    RECONSTRUCTION X2    [place] TOTAL HIP ARTHROPLASTY Left 11/16/2016    ANTERIOR APPROACH, DR Gwnedolyn De La Torre; (POSTOP: STANDS ONE INCH TALLER ON LEFT FOOT)    [place] TOTAL HIP ARTHROPLASTY Right 12/2016    ANTERIOR APPROACH (DR Gwendolyn De La Torre)       Family History   Problem Relation Age of Onset    Heart Disease Mother     Kidney Disease Mother     Lung Disease Mother         COPD    Diabetes Brother     Pacemaker Brother     Kidney Disease Brother     Hypertension Brother     Anesth Problems Neg Hx         Social History     Tobacco Use    Smoking status: Never    Smokeless tobacco: Never   Substance Use Topics    Alcohol use: No        All systems reviewed x 12 and were negative with the exception of None      No flowsheet data found. Vitals:  Ht 5' 3\" (1.6 m)   Wt 196 lb (88.9 kg)   BMI 34.72 kg/m²    Body mass index is 34.72 kg/m².     Physical Exam    General: NAD    Cardiac: Extremities well perfused. Respiratory: Nonlabored breathing. RLE: Incision well-healed. Minimal numbness over the LFCN. Normal gait and station. Negative stinchfield. No pain with gentle internal and external rotation. .  Motor strength is grossly intact. Skin warm well perfused. Capillary refill <2 sec. LLE: Incision well-healed minimal numbness over the LFCN. Normal gait and station. Negative stinchfield. No pain with gentle internal and external rotation. .  Motor strength is grossly intact. Skin warm well perfused. Capillary refill <2 sec. Nate Andrews M.D. was available for immediate consultation as the supervising physician. An electronic signature was used to authenticate this note.   -- Serge De Los Santos PA-C

## 2022-08-12 NOTE — TELEPHONE ENCOUNTER
PCP: Loy Kirby MD    Last appt: 2022  Future Appointments   Date Time Provider Gwendolyn Loving   [facility] 11:40 AM [facility]   10/17/2022 10:40 AM Loy Kirby MD [facility]   10/20/2022 10:30 AM Mila Ardon MD [facility] [address] [facility]       Requested Prescriptions     Pending Prescriptions Disp Refills    insulin NPH (HUMULIN N) 100 unit/mL (3 mL) inpn 3 Pen 5     Si U daily while on 30 mg of prednisone dose--Dose change 22--updated med list--did not send prescription to the pharmacy         Other Comments:

## 2022-09-09 ENCOUNTER — APPOINTMENT (OUTPATIENT)
Dept: CT IMAGING | Age: 77
End: 2022-09-09
Attending: STUDENT IN AN ORGANIZED HEALTH CARE EDUCATION/TRAINING PROGRAM
Payer: MEDICARE

## 2022-09-09 ENCOUNTER — HOSPITAL ENCOUNTER (EMERGENCY)
Age: 77
Discharge: HOME | End: 2022-09-09
Attending: STUDENT IN AN ORGANIZED HEALTH CARE EDUCATION/TRAINING PROGRAM
Payer: MEDICARE

## 2022-09-09 VITALS
RESPIRATION RATE: 14 BRPM | OXYGEN SATURATION: 96 % | HEIGHT: 69 IN | BODY MASS INDEX: 28.14 KG/M2 | TEMPERATURE: 97.9 F | SYSTOLIC BLOOD PRESSURE: 122 MMHG | WEIGHT: 190 LBS | HEART RATE: 74 BPM | DIASTOLIC BLOOD PRESSURE: 68 MMHG

## 2022-09-09 DIAGNOSIS — S06.0X0A CONCUSSION WITHOUT LOSS OF CONSCIOUSNESS, INITIAL ENCOUNTER: ICD-10-CM
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
## 2022-09-09 DIAGNOSIS — G44.319 ACUTE POST-TRAUMATIC HEADACHE, NOT INTRACTABLE: ICD-10-CM

## 2022-09-09 DIAGNOSIS — M54.50 ACUTE MIDLINE LOW BACK PAIN WITHOUT SCIATICA: ICD-10-CM

## 2022-09-09 LAB
ALBUMIN SERPL-MCNC: 3.1 G/DL (ref 3.5–5)
ALBUMIN/GLOB SERPL: 1 {RATIO} (ref 1.1–2.2)
ALP SERPL-CCNC: 105 U/L (ref 45–117)
ALT SERPL-CCNC: 67 U/L (ref 12–78)
ANION GAP SERPL CALC-SCNC: 8 MMOL/L (ref 5–15)
AST SERPL-CCNC: 46 U/L (ref 15–37)
BASOPHILS # BLD: 0 K/UL (ref 0–0.1)
BASOPHILS NFR BLD: 0 % (ref 0–1)
BILIRUB SERPL-MCNC: 0.6 MG/DL (ref 0.2–1)
BUN SERPL-MCNC: 17 MG/DL (ref 6–20)
BUN/CREAT SERPL: 21 (ref 12–20)
CALCIUM SERPL-MCNC: 8.6 MG/DL (ref 8.5–10.1)
CHLORIDE SERPL-SCNC: 104 MMOL/L (ref 97–108)
CO2 SERPL-SCNC: 27 MMOL/L (ref 21–32)
CREAT SERPL-MCNC: 0.81 MG/DL (ref 0.55–1.02)
DIFFERENTIAL METHOD BLD: ABNORMAL
EOSINOPHIL # BLD: 0 K/UL (ref 0–0.4)
EOSINOPHIL NFR BLD: 0 % (ref 0–7)
ERYTHROCYTE [DISTWIDTH] IN BLOOD BY AUTOMATED COUNT: 13.2 % (ref 11.5–14.5)
GLOBULIN SER CALC-MCNC: 3 G/DL (ref 2–4)
GLUCOSE SERPL-MCNC: 273 MG/DL (ref 65–100)
HCT VFR BLD AUTO: 40.2 % (ref 35–47)
HGB BLD-MCNC: 14.4 G/DL (ref 11.5–16)
IMM GRANULOCYTES # BLD AUTO: 0 K/UL (ref 0–0.04)
IMM GRANULOCYTES NFR BLD AUTO: 0 % (ref 0–0.5)
LYMPHOCYTES # BLD: 0.6 K/UL (ref 0.8–3.5)
LYMPHOCYTES NFR BLD: 10 % (ref 12–49)
MCH RBC QN AUTO: 35.1 PG (ref 26–34)
MCHC RBC AUTO-ENTMCNC: 35.8 G/DL (ref 30–36.5)
MCV RBC AUTO: 98 FL (ref 80–99)
MONOCYTES # BLD: 0.1 K/UL (ref 0–1)
MONOCYTES NFR BLD: 2 % (ref 5–13)
NEUTS BAND NFR BLD MANUAL: 1 %
NEUTS SEG # BLD: 5 K/UL (ref 1.8–8)
NEUTS SEG NFR BLD: 87 % (ref 32–75)
NRBC # BLD: 0 K/UL (ref 0–0.01)
NRBC BLD-RTO: 0 PER 100 WBC
PLATELET # BLD AUTO: 157 K/UL (ref 150–400)
PMV BLD AUTO: 10.1 FL (ref 8.9–12.9)
POTASSIUM SERPL-SCNC: 3.7 MMOL/L (ref 3.5–5.1)
PROT SERPL-MCNC: 6.1 G/DL (ref 6.4–8.2)
RBC # BLD AUTO: 4.1 M/UL (ref 3.8–5.2)
RBC MORPH BLD: ABNORMAL
SODIUM SERPL-SCNC: 139 MMOL/L (ref 136–145)
TROPONIN-HIGH SENSITIVITY: 16 NG/L (ref 0–51)
WBC # BLD AUTO: 5.7 K/UL (ref 3.6–11)

## 2022-09-09 PROCEDURE — 74011250636 HC RX REV CODE- 250/636: Performed by: STUDENT IN AN ORGANIZED HEALTH CARE EDUCATION/TRAINING PROGRAM

## 2022-09-09 PROCEDURE — 80053 COMPREHEN METABOLIC PANEL: CPT

## 2022-09-09 PROCEDURE — 74011250637 HC RX REV CODE- 250/637: Performed by: STUDENT IN AN ORGANIZED HEALTH CARE EDUCATION/TRAINING PROGRAM

## 2022-09-09 PROCEDURE — 75810000293 HC SIMP/SUPERF WND  RPR

## 2022-09-09 PROCEDURE — 90715 TDAP VACCINE 7 YRS/> IM: CPT | Performed by: STUDENT IN AN ORGANIZED HEALTH CARE EDUCATION/TRAINING PROGRAM

## 2022-09-09 PROCEDURE — 71250 CT THORAX DX C-: CPT

## 2022-09-09 PROCEDURE — 36415 COLL VENOUS BLD VENIPUNCTURE: CPT

## 2022-09-09 PROCEDURE — 70450 CT HEAD/BRAIN W/O DYE: CPT

## 2022-09-09 PROCEDURE — 85025 COMPLETE CBC W/AUTO DIFF WBC: CPT

## 2022-09-09 PROCEDURE — 84484 ASSAY OF TROPONIN QUANT: CPT

## 2022-09-09 PROCEDURE — 99284 EMERGENCY DEPT VISIT MOD MDM: CPT

## 2022-09-09 PROCEDURE — 74176 CT ABD & PELVIS W/O CONTRAST: CPT

## 2022-09-09 PROCEDURE — 72125 CT NECK SPINE W/O DYE: CPT

## 2022-09-09 PROCEDURE — 90471 IMMUNIZATION ADMIN: CPT

## 2022-09-09 RX ORDER — OXYCODONE HYDROCHLORIDE 5 MG/1
5 TABLET ORAL
Status: COMPLETED | OUTPATIENT
Start: 2022-09-09 | End: 2022-09-09

## 2022-09-09 RX ORDER — ACETAMINOPHEN 325 MG/1
650 TABLET ORAL ONCE
Status: COMPLETED | OUTPATIENT
Start: 2022-09-09 | End: 2022-09-09

## 2022-09-09 RX ORDER — KETOROLAC TROMETHAMINE 30 MG/ML
15 INJECTION, SOLUTION INTRAMUSCULAR; INTRAVENOUS
Status: DISCONTINUED | OUTPATIENT
Start: 2022-09-09 | End: 2022-09-09 | Stop reason: HOSPADM

## 2022-09-09 RX ADMIN — ACETAMINOPHEN 650 MG: 325 TABLET ORAL at 14:40

## 2022-09-09 RX ADMIN — OXYCODONE 5 MG: 5 TABLET ORAL at 14:40

## 2022-09-09 RX ADMIN — TETANUS TOXOID, REDUCED DIPHTHERIA TOXOID AND ACELLULAR PERTUSSIS VACCINE, ADSORBED 0.5 ML: 5; 2.5; 8; 8; 2.5 SUSPENSION INTRAMUSCULAR at 15:43

## 2022-09-09 NOTE — ED NOTES
Pt says her pain is feeling much better att. Pt resting in the bed without complaints. Will monitor for acute changes.

## 2022-09-09 NOTE — ED NOTES
This rn at bedside for dc went over papers, pt verbalized understanding. Iv removed tip intact.  Pt out via wheelchair

## 2022-09-09 NOTE — DISCHARGE INSTRUCTIONS
Over-the-counter medicine at home for pain, only take prescribed pain medication as directed at home.   Please follow-up with primary care doctor within the next few days and

## 2022-09-09 NOTE — ED PROVIDER NOTES
EMERGENCY DEPARTMENT HISTORY AND PHYSICAL EXAM      Date: 9/9/2022  Patient Name: Melanie Rucker    History of Presenting Illness     Chief Complaint   Patient presents with    Fall     Got up to go to front door and tripped over something and fell on her face, worried she broke her nose. Now she can't really stand and she has been slurring some now. Gait Problem       History Provided By: Patient    Melanie Rucker, 78 y.o. female     Is a 29-year-old female with history of hypertension, CKD, diabetes, strong disease, chronic pain presenting with concerns of ground-level fall today. Patient presents with son who states that she had a mechanical fall approximately noon today, was on the ground for approximate 20 minutes before able to get to a phone. Called him and she was unable to walk afterwards, was confused and was slurring her words so when they presented to urgent care notified him to come to ED. Patient is now complaining of mild headache, laceration to left forehead. Lower back pain and left chest wall pain. Patient states that she has had no episodes of vomiting since the event, no weakness, numbness, tingling in the extremities. Patient denies any neck pain, no chest pain, shortness of breath or abdominal pain. Patient states that she was not weak or dizzy before the event, denies any syncopal episode, although does not remember the entire event. Patient is now alert and oriented and answering questions appropriately. Has blood thinners. No other complaints today. There are no other complaints, changes, or physical findings at this time. PCP: Mathieu Dickerson MD    No current facility-administered medications on file prior to encounter.      Current Outpatient Medications on File Prior to Encounter   Medication Sig Dispense Refill    insulin NPH (HUMULIN N) 100 unit/mL (3 mL) inpn 65 U daily while on 30 mg of prednisone dose--Dose change 07/02/22--updated med list--did not send prescription to the pharmacy 3 Pen 5    zinc 50 mg tab tablet Take  by mouth daily. docosahexaenoic acid/epa (FISH OIL PO) Take  by mouth. sertraline (ZOLOFT) 100 mg tablet TAKE 1 TABLET DAILY 90 Tablet 3    potassium chloride (K-DUR, KLOR-CON M20) 20 mEq tablet Take 1 Tablet by mouth three (3) times daily. 270 Tablet 3    gabapentin (NEURONTIN) 100 mg capsule Take 1 Capsule by mouth three (3) times daily. Max Daily Amount: 300 mg. 90 Capsule 5    carvediloL (COREG) 6.25 mg tablet TAKE 1 TABLET TWICE A  Tablet 3    Dulera 200-5 mcg/actuation HFA inhaler Take 2 Puffs by inhalation two (2) times a day. LORazepam (ATIVAN) 1 mg tablet Take 1 Tablet by mouth every eight (8) hours as needed for Anxiety. Max Daily Amount: 3 mg. 90 Tablet 0    Insulin Needles, Disposable, (BD Ultra-Fine Short Pen Needle) 31 gauge x 5/16\" ndle USE UNDER THE SKIN DAILY. USE WITH LANTUS FLEX PEN DAILY 100 Pen Needle 3    glucose blood VI test strips (True Metrix Glucose Test Strip) strip USE ONCE DAILY AND RECORD RESULTS IN A NOTEBOOK ALSO RECORD LAST MEALTIME IN NOTEBOOK 100 Strip 3    insulin glargine (Lantus Solostar U-100 Insulin) 100 unit/mL (3 mL) inpn INJECT 45 UNITS IN THE MORNING AND 36 UNITS AT NIGHT--Dose change 06/13/22--updated med list--did not send prescription to the pharmacy 10 Pen 3    primidone (Mysoline) 50 mg tablet Take 0.5 Tablets by mouth two (2) times a day. 60 Tablet 3    Ofev 150 mg cap two (2) times a day.       predniSONE (DELTASONE) 10 mg tablet Take 3 tablets daily-Dose change 05/03/22--updated med list--did not send prescription to the pharmacy 360 Tablet 3    benzonatate (TESSALON) 200 mg capsule TAKE 1 CAPSULE BY MOUTH THREE TIMES DAILY AS NEEDED FOR COUGH - SWALLOW CAPSULE WHOLE (DO NOT CRUSH) 60 Capsule 1    hydroCHLOROthiazide (HYDRODIURIL) 25 mg tablet TAKE 1 TABLET DAILY FOR FLUID AND BLOOD PRESSURE 90 Tablet 3    albuterol (ProAir HFA) 90 mcg/actuation inhaler Take 1 Puff by inhalation every four (4) hours as needed for Wheezing or Shortness of Breath. 18 g 5    rosuvastatin (CRESTOR) 20 mg tablet Take 1 Tablet by mouth nightly. 90 Tablet 3    buPROPion XL (WELLBUTRIN XL) 150 mg tablet TAKE 1 TABLET DAILY 90 Tablet 3    fenofibrate nanocrystallized (TRICOR) 145 mg tablet Take 1 Tablet by mouth daily. 90 Tablet 3    clemastine 2.68 mg tab tablet Take 2.68 mg by mouth two (2) times a day. pantoprazole (PROTONIX) 40 mg tablet Take 40 mg by mouth daily. montelukast sodium (SINGULAIR PO) Take 10 mg by mouth nightly. 10 mg tab         Past History     Past Medical History:  Past Medical History:   Diagnosis Date    Abnormal LFTs 08/24/2017    FATTY LIVER    Arthritis     OSTEO    Avascular necrosis of hip ([facility] [address] 08/24/2017    BILAT HIPS    Breast CA ([facility] [address] 1989, 2001    BILATERAL; SURGERY, CHEMO    Chronic pain     CKD (chronic kidney disease), stage II 8/24/2017    Coagulation disorder ([facility] [address] 1984    ITP  (DR CHU BRADSHAW) - PLATELETS DROPPED TO 5K    Depression     Diabetes ([facility] [address] 2012    TYPE 2; NIDDM    DJD (degenerative joint disease), multiple sites 8/24/2017    GI bleed 2008    HEMORRHOIDS    Hyperlipemia     Hypertension with renal disease 8/24/2017    IBS (irritable bowel syndrome) 8/24/2017    Ill-defined condition 2015    PNEUMONIA X2 - HOSPITALIZED IN 2015    Interstitial lung disease ([facility] 04/01/2019    Liver disease     FATTY LIVER    Myalgia 8/24/2017    On statin therapy 8/24/2017    Overactive bladder 8/24/2017    Pneumonia 04/2015    HOSPITALIZED 3 WEEKS.     Polymyalgia rheumatica ([address] 8/24/2017    Prophylactic antibiotic 8/24/2017    Reflex abnormality     acid reflex    Rosacea        Past Surgical History:  Past Surgical History:   Procedure Laterality Date    HX APPENDECTOMY  1950    HX BREAST BIOPSY Bilateral     HX CATARACT REMOVAL Bilateral     HX COLONOSCOPY      HX ENDOSCOPY      HX GI      COLONOSCOPY    HX HEENT      WISDOM TEETH    HX HYSTERECTOMY  2000S    HX MASTECTOMY Right 1989    HX MASTECTOMY Left 2001    HX ORTHOPAEDIC  2008    LUMBAR FUSION    HX ORTHOPAEDIC  2018    RE-DO LUMBAR FUSION, AND ADDED THORACIC FUSION    HX ORTHOPAEDIC  03/26/2019    neckectomy    HX TUBAL LIGATION  1981    HX UROLOGICAL  2000s    BLADDER SLING    CA BREAST SURGERY PROCEDURE UNLISTED  1993, 2002    RECONSTRUCTION X2    CA TOTAL HIP ARTHROPLASTY Left 11/16/2016    ANTERIOR APPROACH, DR Gwendolyn De La Torre; (POSTOP: STANDS ONE INCH TALLER ON LEFT FOOT)    CA TOTAL HIP ARTHROPLASTY Right 12/2016    ANTERIOR APPROACH (DR Gwendolyn De La Torre)       Family History:  Family History   Problem Relation Age of Onset    Heart Disease Mother     Kidney Disease Mother     Lung Disease Mother         COPD    Diabetes Brother     Pacemaker Brother     Kidney Disease Brother     Hypertension Brother     Anesth Problems Neg Hx        Social History:  Social History     Tobacco Use    Smoking status: Never    Smokeless tobacco: Never   Vaping Use    Vaping Use: Never used   Substance Use Topics    Alcohol use: No    Drug use: No       Allergies: Allergies   Allergen Reactions    Metformin Diarrhea    Statins-Hmg-Coa Reductase Inhibitors Myalgia     Myalgia with  multiple statins  Takes pravachol     Codeine Rash     Rash on thighs    Darvon [Propoxyphene] Other (comments)     \"I see worms\"    Pcn [Penicillins] Rash    Sulfa (Sulfonamide Antibiotics) Rash         Review of Systems   Review of Systems   Constitutional:  Positive for activity change. Negative for appetite change, chills and fatigue. HENT:  Negative for nosebleeds. Eyes:  Negative for pain and visual disturbance. Respiratory:  Negative for chest tightness and shortness of breath. Cardiovascular:  Negative for chest pain. Gastrointestinal:  Negative for abdominal pain, nausea and vomiting. Genitourinary:  Negative for decreased urine volume and difficulty urinating. Musculoskeletal:  Positive for back pain, gait problem and neck pain.  Negative for arthralgias and myalgias. Skin:  Negative for rash. Neurological:  Positive for headaches. Negative for dizziness, seizures, syncope, weakness and light-headedness. Hematological:  Does not bruise/bleed easily. Psychiatric/Behavioral:  Positive for confusion. The patient is nervous/anxious. Physical Exam   Physical Exam  Vitals reviewed. Constitutional:       General: She is not in acute distress. Appearance: She is not ill-appearing. HENT:      Head: Normocephalic and atraumatic. Mouth/Throat:      Mouth: Mucous membranes are moist.   Eyes:      Extraocular Movements: Extraocular movements intact. Conjunctiva/sclera: Conjunctivae normal.      Pupils: Pupils are equal, round, and reactive to light. Cardiovascular:      Rate and Rhythm: Normal rate. Pulses: Normal pulses. Pulmonary:      Effort: Pulmonary effort is normal. No respiratory distress. Chest:      Chest wall: Tenderness (L posterior chest wal TTP) present. Abdominal:      General: Abdomen is flat. Palpations: Abdomen is soft. Tenderness: There is no abdominal tenderness. There is no guarding or rebound. Musculoskeletal:         General: No tenderness, deformity or signs of injury. Cervical back: Normal range of motion and neck supple. No tenderness. Comments: TTP over Lumbar spine, old scar. Patient able to fully range bilaterally at wrist, elbow and shoulder without complaints of pain, able to fully range ankle, knee and hip joints without complaints of pain. No tenderness to cervical lumbar or thoracic spine, able to range neck radiculopathy pain. No overlying abrasions, bruising to R thigh states 2/2 to insulin   Skin:     General: Skin is warm and dry. Comments: Less than 1 cm laceration to left forehead   Neurological:      General: No focal deficit present. Mental Status: She is alert and oriented to person, place, and time. Cranial Nerves: No cranial nerve deficit. Motor: No weakness. Psychiatric:         Mood and Affect: Mood normal.         Behavior: Behavior normal.       Diagnostic Study Results     Labs -     Recent Results (from the past 24 hour(s))   CBC WITH AUTOMATED DIFF    Collection Time: 09/09/22  2:37 PM   Result Value Ref Range    WBC 5.7 3.6 - 11.0 K/uL    RBC 4.10 3.80 - 5.20 M/uL    HGB 14.4 11.5 - 16.0 g/dL    HCT 40.2 35.0 - 47.0 %    MCV 98.0 80.0 - 99.0 FL    MCH 35.1 (H) 26.0 - 34.0 PG    MCHC 35.8 30.0 - 36.5 g/dL    RDW 13.2 11.5 - 14.5 %    PLATELET 191 067 - 674 K/uL    MPV 10.1 8.9 - 12.9 FL    NRBC 0.0 0  WBC    ABSOLUTE NRBC 0.00 0.00 - 0.01 K/uL    NEUTROPHILS 87 (H) 32 - 75 %    BAND NEUTROPHILS 1 %    LYMPHOCYTES 10 (L) 12 - 49 %    MONOCYTES 2 (L) 5 - 13 %    EOSINOPHILS 0 0 - 7 %    BASOPHILS 0 0 - 1 %    IMMATURE GRANULOCYTES 0 0.0 - 0.5 %    ABS. NEUTROPHILS 5.0 1.8 - 8.0 K/UL    ABS. LYMPHOCYTES 0.6 (L) 0.8 - 3.5 K/UL    ABS. MONOCYTES 0.1 0.0 - 1.0 K/UL    ABS. EOSINOPHILS 0.0 0.0 - 0.4 K/UL    ABS. BASOPHILS 0.0 0.0 - 0.1 K/UL    ABS. IMM. GRANS. 0.0 0.00 - 0.04 K/UL    DF MANUAL      RBC COMMENTS NORMOCYTIC, NORMOCHROMIC     METABOLIC PANEL, COMPREHENSIVE    Collection Time: 09/09/22  2:37 PM   Result Value Ref Range    Sodium 139 136 - 145 mmol/L    Potassium 3.7 3.5 - 5.1 mmol/L    Chloride 104 97 - 108 mmol/L    CO2 27 21 - 32 mmol/L    Anion gap 8 5 - 15 mmol/L    Glucose 273 (H) 65 - 100 mg/dL    BUN 17 6 - 20 MG/DL    Creatinine 0.81 0.55 - 1.02 MG/DL    BUN/Creatinine ratio 21 (H) 12 - 20      GFR est AA >60 >60 ml/min/1.73m2    GFR est non-AA >60 >60 ml/min/1.73m2    Calcium 8.6 8.5 - 10.1 MG/DL    Bilirubin, total 0.6 0.2 - 1.0 MG/DL    ALT (SGPT) 67 12 - 78 U/L    AST (SGOT) 46 (H) 15 - 37 U/L    Alk.  phosphatase 105 45 - 117 U/L    Protein, total 6.1 (L) 6.4 - 8.2 g/dL    Albumin 3.1 (L) 3.5 - 5.0 g/dL    Globulin 3.0 2.0 - 4.0 g/dL    A-G Ratio 1.0 (L) 1.1 - 2.2     TROPONIN-HIGH SENSITIVITY    Collection Time: 09/09/22  2:37 PM   Result Value Ref Range    Troponin-High Sensitivity 16 0 - 51 ng/L       Radiologic Studies -   CT SPINE CERV WO CONT   Final Result   There is no acute fracture or dislocation identified. CT HEAD WO CONT   Final Result   No acute intracranial process. Imaging findings consistent with minimal chronic microvascular ischemic change. There is a mild degree of cerebral atrophy. CT ABD PELV WO CONT   Final Result   No acute process is identified in the chest, abdomen or pelvis. Incidental findings are as described above. CT CHEST WO CONT   Final Result   No acute process is identified in the chest, abdomen or pelvis. Incidental findings are as described above. CT Results  (Last 48 hours)                 09/09/22 1500  CT SPINE CERV WO CONT Final result    Impression:  There is no acute fracture or dislocation identified. Narrative:  EXAM: CT SPINE CERV WO CONT   CLINICAL HISTORY: GLF   INDICATION: GLF   COMPARISON:  2019   TECHNIQUE:  Axial neck CT was performed. Noncontrast imaging obtained. Coronal   and sagittal reconstructions were performed. CT dose reduction was achieved through use of a standardized protocol tailored   for this examination and automatic exposure control for dose modulation. Osseous/bone algorithm was utilized. FINDINGS:   The vertebral bodies are anatomically aligned. There is no evidence of fracture   or subluxation. The prevertebral soft tissues are grossly within normal limits. The atlantodental interval is within normal limits. The craniocervical junction   is intact. ACDF at C5-6 and C6-7. No pneumothorax. No large pulmonary nodule. Multilevel   facet degenerative change. 09/09/22 1500  CT HEAD WO CONT Final result    Impression:  No acute intracranial process. Imaging findings consistent with minimal chronic microvascular ischemic change. There is a mild degree of cerebral atrophy. Narrative:  CLINICAL HISTORY: GLF   INDICATION: GLF   COMPARISON: 2019. CT dose reduction was achieved through use of a standardized protocol tailored   for this examination and automatic exposure control for dose modulation. TECHNIQUE: Serial axial images with a collimation of 5 mm were obtained from the   skull base through the vertex     FINDINGS:    There is sulcal and ventricular prominence. Confluent periventricular and   scattered foci of hypodensity in the cerebral white matter. There is no evidence   of an acute infarction, hemorrhage, or mass-effect. There is no evidence of   midline shift or hydrocephalus. Posterior fossa structures are unremarkable. No   extra-axial collections are seen. Mastoid air cells are well pneumatized and clear. There is no evidence of depressed skull fractures of soft tissue swelling. 09/09/22 1500  CT ABD PELV WO CONT Final result    Impression:  No acute process is identified in the chest, abdomen or pelvis. Incidental findings are as described above. Narrative:  Clinical history: GLF   INDICATION:   GLF   COMPARISON:  12/11/2021       TECHNIQUE:    Thin axial images were obtained through the chest, abdomen and pelvis. Coronal   and sagittal reconstructions were generated. Oral contrast was not administered. CT dose reduction was achieved through use of a standardized protocol tailored   for this examination and automatic exposure control for dose modulation. Adaptive statistical iterative reconstruction (ASIR) was utilized. FINDINGS:        SUPRACLAVICULAR REGION: No supraclavicular adenopathy. There is a left breast   implant with capsular rupture. Scattered hypodensities in the right chest wall. MEDIASTINUM: Ossified mediastinal and hilar lymph nodes. Coronary artery   calcifications. Trace pericardial effusion. Aortic atherosclerotic change. No   hilar or mediastinal lymphadenopathy. No esophageal mass.  No endotracheal or   endobronchial mass. PLEURA/LUNGS[de-identified] Scattered reticular nodular opacities basilar atelectasis. No new   pulmonary mass or nodule. There is no pleural or pericardial effusion. SOFT TISSUE/ AXILLA: No axillary adenopathy. No chest wall mass. LIVER/GALLBLADDER: Hepatic steatosis There is no intrahepatic duct dilatation. The CBD is not dilated. SPLEEN/PANCREAS: No mass. . There is no pancreatic mass. There is no pancreatic   duct dilatation. There is no evidence of splenomegaly. ADRENALS/KIDNEYS: Left renal hypodensity is likely a cyst. No imaging follow-up   needed. . There is no adrenal mass. There is no hydroureter or hydronephrosis. There is no perinephric mass. No ureteral or bladder calculus. STOMACH, COLON AND SMALL BOWEL: Fecal stasis. No obstruction. There is no   obstruction or free intraperitoneal air. There is no evidence of incarceration   or obstruction. No mesenteric adenopathy. No retroperitoneal adenopathy. APPENDIX: Absent   PERITONEUM/RETROPERITONEUM: There is no abdominal aortic aneurysm. No   significant lymphadenopathy. URINARY BLADDER: No mass or calculus. PELVIS: Unremarkable. There is no pelvic sidewall mass. There is no inguinal   adenopathy. BONES: Right and left hip arthroplasty. Extensive stabilization hardware   throughout the lumbar and lower thoracic spine. . No lytic or blastic lesions. No   evidence of fracture or dislocation. 09/09/22 1500  CT CHEST WO CONT Final result    Impression:  No acute process is identified in the chest, abdomen or pelvis. Incidental findings are as described above. Narrative:  Clinical history: GLF   INDICATION:   GLF   COMPARISON:  12/11/2021       TECHNIQUE:    Thin axial images were obtained through the chest, abdomen and pelvis. Coronal   and sagittal reconstructions were generated. Oral contrast was not administered.            CT dose reduction was achieved through use of a standardized protocol tailored   for this examination and automatic exposure control for dose modulation. Adaptive statistical iterative reconstruction (ASIR) was utilized. FINDINGS:        SUPRACLAVICULAR REGION: No supraclavicular adenopathy. There is a left breast   implant with capsular rupture. Scattered hypodensities in the right chest wall. MEDIASTINUM: Ossified mediastinal and hilar lymph nodes. Coronary artery   calcifications. Trace pericardial effusion. Aortic atherosclerotic change. No   hilar or mediastinal lymphadenopathy. No esophageal mass. No endotracheal or   endobronchial mass. PLEURA/LUNGS[de-identified] Scattered reticular nodular opacities basilar atelectasis. No new   pulmonary mass or nodule. There is no pleural or pericardial effusion. SOFT TISSUE/ AXILLA: No axillary adenopathy. No chest wall mass. LIVER/GALLBLADDER: Hepatic steatosis There is no intrahepatic duct dilatation. The CBD is not dilated. SPLEEN/PANCREAS: No mass. . There is no pancreatic mass. There is no pancreatic   duct dilatation. There is no evidence of splenomegaly. ADRENALS/KIDNEYS: Left renal hypodensity is likely a cyst. No imaging follow-up   needed. . There is no adrenal mass. There is no hydroureter or hydronephrosis. There is no perinephric mass. No ureteral or bladder calculus. STOMACH, COLON AND SMALL BOWEL: Fecal stasis. No obstruction. There is no   obstruction or free intraperitoneal air. There is no evidence of incarceration   or obstruction. No mesenteric adenopathy. No retroperitoneal adenopathy. APPENDIX: Absent   PERITONEUM/RETROPERITONEUM: There is no abdominal aortic aneurysm. No   significant lymphadenopathy. URINARY BLADDER: No mass or calculus. PELVIS: Unremarkable. There is no pelvic sidewall mass. There is no inguinal   adenopathy. BONES: Right and left hip arthroplasty. Extensive stabilization hardware   throughout the lumbar and lower thoracic spine. . No lytic or blastic lesions.  No evidence of fracture or dislocation. CXR Results  (Last 48 hours)      None              Medical Decision Making   I am the first provider for this patient. I reviewed the vital signs, available nursing notes, past medical history, past surgical history, family history and social history. Vital Signs-Reviewed the patient's vital signs. Patient Vitals for the past 12 hrs:   Temp Pulse Resp BP SpO2   09/09/22 1357 97.9 °F (36.6 °C) 74 14 122/68 96 %       Records Reviewed: Nursing records and medical records reviewed    Ddx: Ground-level fall, compression fracture, rib fracture, altered mental status, concussion    Initial assessment performed. The patients presenting problems have been discussed, and they are in agreement with the care plan formulated and outlined with them. I have encouraged them to ask questions as they arise throughout their visit. MDM  Number of Diagnoses or Management Options  Acute midline low back pain without sciatica  Acute post-traumatic headache, not intractable  Concussion without loss of consciousness, initial encounter  Fall, initial encounter  Diagnosis management comments: Patient is 79-year-old presenting after ground-level fall, appears to be mechanical in origin, complaining of headache, small laceration to left forehead, back pain and left chest wall pain. Patient tender palpation over lumbar spine and left chest wall, otherwise vital signs are stable, no apparent distress, will plan on CT head, C-spine age and risk factors, will obtain CT chest to evaluate for rib fractures and CT abdomen to evaluate her spine. Patient had difficulty ambulating prior to arrival if work-up negative will consider ambulation test for saef discharge.        Amount and/or Complexity of Data Reviewed  Clinical lab tests: reviewed  Tests in the radiology section of CPT®: reviewed        ED Course as of 09/09/22 1627   Fri Sep 09, 2022   1538 Cts is negative, plan on ambulation for discharge and if stable post follow-up with primary care doctor. [RN]      ED Course User Index  [RN] Samira Burk MD           Wound Closure by Adhesive    Date/Time: 9/9/2022 4:24 PM  Performed by: Samira Burk MD  Authorized by: Samira Burk MD     Consent:     Consent obtained:  Verbal    Consent given by:  Patient    Risks discussed:  Infection, need for additional repair, poor cosmetic result and poor wound healing    Alternatives discussed:  No treatment  Anesthesia:     Anesthesia method:  None  Laceration details:     Location:  Face    Face location:  Forehead    Length (cm):  1  Exploration:     Hemostasis achieved with:  Direct pressure    Wound exploration: wound explored through full range of motion      Contaminated: no    Treatment:     Amount of cleaning:  Standard    Irrigation solution:  Sterile saline    Irrigation method:  Syringe    Debridement:  None  Skin repair:     Repair method:  Tissue adhesive  Approximation:     Approximation:  Close  Repair type:     Repair type:  Simple  Post-procedure details:     Dressing:  Open (no dressing)    Procedure completion:  Tolerated        Disposition: Discharge Note:  4:27 PM  The patient has been re-evaluated and is ready for discharge. Reviewed available results with patient. Counseled patient on diagnosis and care plan. Patient has expressed understanding, and all questions have been answered. Patient agrees with plan and agrees to follow up as recommended, or to return to the ED if their symptoms worsen. Discharge instructions have been provided and explained to the patient, along with reasons to return to the ED. DISCHARGE PLAN:  1. Current Discharge Medication List        2.    Follow-up Information       Follow up With Specialties Details Why Contact Info    Cy Pena MD Internal Medicine Physician Schedule an appointment as soon as possible for a visit in 3 days  [address] [facility] [address]  [phone]      [facility] EMERGENCY DEPT Emergency Medicine  If symptoms worsen [address] [place] [address]  [phone]          3. Return to ED if worse     Diagnosis     Clinical Impression:   1. Fall, initial encounter    2. Acute post-traumatic headache, not intractable    3. Concussion without loss of consciousness, initial encounter    4. Acute midline low back pain without sciatica        Attestations:    Brian Coronel MD    Please note that this dictation was completed with [employer], the computer voice recognition software. Quite often unanticipated grammatical, syntax, homophones, and other interpretive errors are inadvertently transcribed by the computer software. Please disregard these errors. Please excuse any errors that have escaped final proofreading. Thank you.

## 2022-09-12 RX ORDER — PEN NEEDLE, DIABETIC 30 GX3/16"
NEEDLE, DISPOSABLE MISCELLANEOUS
Qty: 90 EACH | Refills: 3 | Status: SHIPPED | OUTPATIENT
Start: 2022-09-12

## 2022-09-12 RX ORDER — BUPROPION HYDROCHLORIDE 150 MG/1
TABLET ORAL
Qty: 90 TABLET | Refills: 3 | Status: SHIPPED | OUTPATIENT
Start: 2022-09-12

## 2022-09-12 NOTE — TELEPHONE ENCOUNTER
RX refill request from the patient/pharmacy. Patient last seen 07- with labs, and next appt. scheduled for 10-  Requested Prescriptions     Pending Prescriptions Disp Refills    buPROPion XL (WELLBUTRIN XL) 150 mg tablet [Pharmacy Med Name: BUPROPION HCL XL TABS 150MG] 90 Tablet 3     Sig: TAKE 1 TABLET DAILY    Insulin Needles, Disposable, (BD Ultra-Fine Short Pen Needle) 31 gauge x 5/16\" ndle [Pharmacy Med Name: BD PEN LEELEE UF SHORT 8MM 90S 31G5/16] 90 Each 3     Sig: USE UNDER THE SKIN DAILY WITH LANTUS SOLOSTAR PEN DAILY    .

## 2022-09-16 ENCOUNTER — TELEPHONE (OUTPATIENT)
Dept: NEUROLOGY | Age: 77
End: 2022-09-16

## 2022-09-16 NOTE — TELEPHONE ENCOUNTER
Voicemail:    Patient called to speak with Nurse. Stated she fell last week and went to ER. She was told she has a concussion and is a little concerned because she has had this headache for a week now. Please advise.   [phone]

## 2022-09-19 NOTE — TELEPHONE ENCOUNTER
Called and spoke with patient. Verified name/. Relayed Dr. Ace Poag message. I scheduled patient for virtual appointment tomorrow as patient did not have reliable transportation. Patient stated she is feeling better but still getting headaches.

## 2022-09-20 ENCOUNTER — VIRTUAL VISIT (OUTPATIENT)
Dept: NEUROLOGY | Age: 77
End: 2022-09-20

## 2022-09-20 DIAGNOSIS — G62.9 NEUROPATHY: Primary | ICD-10-CM

## 2022-09-20 RX ORDER — LEVOFLOXACIN 500 MG/1
TABLET, FILM COATED ORAL
COMMUNITY
End: 2022-09-21 | Stop reason: ALTCHOICE

## 2022-09-20 RX ORDER — CLONAZEPAM 1 MG/1
1 TABLET ORAL DAILY
COMMUNITY

## 2022-09-20 RX ORDER — CIPROFLOXACIN 250 MG/1
TABLET, FILM COATED ORAL
COMMUNITY
End: 2022-09-21 | Stop reason: ALTCHOICE

## 2022-09-20 RX ORDER — MENTHOL AND ZINC OXIDE .44; 20.625 G/100G; G/100G
OINTMENT TOPICAL
COMMUNITY
End: 2022-09-21 | Stop reason: ALTCHOICE

## 2022-09-20 RX ORDER — DOXYCYCLINE 100 MG/1
CAPSULE ORAL
COMMUNITY
End: 2022-10-20 | Stop reason: ALTCHOICE

## 2022-09-21 ENCOUNTER — OFFICE VISIT (OUTPATIENT)
Dept: NEUROLOGY | Age: 77
End: 2022-09-21
Payer: MEDICARE

## 2022-09-21 VITALS
SYSTOLIC BLOOD PRESSURE: 130 MMHG | HEART RATE: 80 BPM | TEMPERATURE: 97.7 F | OXYGEN SATURATION: 96 % | BODY MASS INDEX: 33.77 KG/M2 | RESPIRATION RATE: 16 BRPM | WEIGHT: 190.6 LBS | HEIGHT: 63 IN | DIASTOLIC BLOOD PRESSURE: 80 MMHG

## 2022-09-21 DIAGNOSIS — S06.0X0D CONCUSSION WITHOUT LOSS OF CONSCIOUSNESS, SUBSEQUENT ENCOUNTER: Primary | ICD-10-CM

## 2022-09-21 DIAGNOSIS — G31.9 CEREBRAL ATROPHY (HCC): ICD-10-CM

## 2022-09-21 DIAGNOSIS — G25.0 ESSENTIAL TREMOR: ICD-10-CM

## 2022-09-21 DIAGNOSIS — Z79.899 POLYPHARMACY: ICD-10-CM
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
## 2022-09-21 DIAGNOSIS — G62.9 LENGTH-DEPENDENT PERIPHERAL NEUROPATHY: ICD-10-CM

## 2022-09-21 PROCEDURE — 1123F ACP DISCUSS/DSCN MKR DOCD: CPT | Performed by: NURSE PRACTITIONER

## 2022-09-21 PROCEDURE — 99214 OFFICE O/P EST MOD 30 MIN: CPT | Performed by: NURSE PRACTITIONER

## 2022-09-21 NOTE — PROGRESS NOTES
Chief Complaint   Patient presents with    Hospital Follow Up     Annual Review  10-   fell last week and went to ER. She was told she has a concussion and is a little concerned because she has had this headache for a week now       1. Have you been to the ER, urgent care clinic since your last visit? Yes   Hospitalized since your last visit? No    2. Have you seen or consulted any other health care providers outside of the [address] [place] [address] since your last visit? Yes pulmonary Dr JOSE JUAN Peace Luke any pap smears or colon screening. NO    Patient C/O headache all week and back discomfort 2/10, has noted a film over left eye since fall.

## 2022-09-21 NOTE — PROGRESS NOTES
[facility]  [address] [facility] [address] [place] [address]  Yadira Estrada  Tel: [phone]  Fax: [phone]      Date:  22     Name:  Maria Elena Edouard  :  1945  MRN:  [id number]     PCP:  José Miguel Omer MD    Chief Complaint   Patient presents with    Hospital Follow Up     Annual Review  10-   fell last week and went to ER. She was told she has a concussion and is a little concerned because she has had this headache for a week now         HISTORY OF PRESENT ILLNESS:  Patient presents today for concussion, that occurred after she fell on 2022. .    In 2018, she was hospitalized after orthopedic surgery, had a bad cough, so they did some CT scans, interstitial lung disease. Ended up with a lot of back pain, did multiple modalities to include PT,   PT has home health therapist, comes 1 x week. Does some walking, safety around the house. Exercises to do for neck and arms. Tens unit does help. They have been coming for nearly  Taking Tramadol prn for pain. Takes Klonipin and Ativan. Gabapentin 100mg: takes it for neuropathy,   PCP Dr Quinton Alcaraz: Appointment coming up, she plans to discuss all the medications she is taking. 2022 ER Visit: fall approximately noon today, was on the ground for approximate 20 minutes before able to get to a phone. Called him and she was unable to walk afterwards, was confused and was slurring her words so when they presented to urgent care notified him to come to ED. Patient is now complaining of mild headache, laceration to left forehead. Lower back pain and left chest wall pain. Patient states that she has had no episodes of vomiting since the event, no weakness, numbness, tingling in the extremities. Patient denies any neck pain, no chest pain, shortness of breath or abdominal pain. Patient states that she was not weak or dizzy before the event, denies any syncopal episode, although does not remember the entire event.   Patient is now alert and oriented and answering questions appropriately. Has blood thinners. No other complaints today. REVIEW OF SYSTEMS:     Review of Systems   Eyes:  Positive for blurred vision (left eye vision issues since falling, sees Opthamology next week). Gastrointestinal:  Negative for nausea and vomiting. Genitourinary:         C/o some incontinence since 2018   Musculoskeletal:  Positive for back pain and falls. Neurological:  Positive for sensory change, weakness (B legs, needs help getting up when she falls down) and headaches (back of the head). Negative for dizziness. Speech change: B legs. Psychiatric/Behavioral:  The patient does not have insomnia (good b/c she takes alot of meds). All other systems reviewed and are negative. Current Outpatient Medications   Medication Sig    doxycycline (VIBRAMYCIN) 100 mg capsule     collagenase (SANTYL) 250 unit/gram ointment Santyl 250 unit/gram topical ointment    clonazePAM (KlonoPIN) 1 mg tablet Take 1 mg by mouth daily. buPROPion XL (WELLBUTRIN XL) 150 mg tablet TAKE 1 TABLET DAILY (Patient taking differently: 150 mg two (2) times a day.)    Insulin Needles, Disposable, (BD Ultra-Fine Short Pen Needle) 31 gauge x 5/16\" ndle USE UNDER THE SKIN DAILY WITH LANTUS SOLOSTAR PEN DAILY    insulin NPH (HUMULIN N) 100 unit/mL (3 mL) inpn 65 U daily while on 30 mg of prednisone dose--Dose change 07/02/22--updated med list--did not send prescription to the pharmacy    zinc 50 mg tab tablet Take  by mouth daily. docosahexaenoic acid/epa (FISH OIL PO) Take  by mouth. sertraline (ZOLOFT) 100 mg tablet TAKE 1 TABLET DAILY (Patient taking differently: Take 100 mg by mouth two (2) times a day.)    potassium chloride (K-DUR, KLOR-CON M20) 20 mEq tablet Take 1 Tablet by mouth three (3) times daily. gabapentin (NEURONTIN) 100 mg capsule Take 1 Capsule by mouth three (3) times daily.  Max Daily Amount: 300 mg.    carvediloL (COREG) 6.25 mg tablet TAKE 1 TABLET TWICE A DAY    Dulera 200-5 mcg/actuation HFA inhaler Take 2 Puffs by inhalation two (2) times a day. LORazepam (ATIVAN) 1 mg tablet Take 1 Tablet by mouth every eight (8) hours as needed for Anxiety. Max Daily Amount: 3 mg.    glucose blood VI test strips (True Metrix Glucose Test Strip) strip USE ONCE DAILY AND RECORD RESULTS IN A NOTEBOOK ALSO RECORD LAST MEALTIME IN NOTEBOOK    insulin glargine (Lantus Solostar U-100 Insulin) 100 unit/mL (3 mL) inpn INJECT 45 UNITS IN THE MORNING AND 36 UNITS AT NIGHT--Dose change 06/13/22--updated med list--did not send prescription to the pharmacy    primidone (Mysoline) 50 mg tablet Take 0.5 Tablets by mouth two (2) times a day. Ofev 150 mg cap two (2) times a day. predniSONE (DELTASONE) 10 mg tablet Take 3 tablets daily-Dose change 05/03/22--updated med list--did not send prescription to the pharmacy    benzonatate (TESSALON) 200 mg capsule TAKE 1 CAPSULE BY MOUTH THREE TIMES DAILY AS NEEDED FOR COUGH - SWALLOW CAPSULE WHOLE (DO NOT CRUSH)    hydroCHLOROthiazide (HYDRODIURIL) 25 mg tablet TAKE 1 TABLET DAILY FOR FLUID AND BLOOD PRESSURE    albuterol (ProAir HFA) 90 mcg/actuation inhaler Take 1 Puff by inhalation every four (4) hours as needed for Wheezing or Shortness of Breath. rosuvastatin (CRESTOR) 20 mg tablet Take 1 Tablet by mouth nightly. fenofibrate nanocrystallized (TRICOR) 145 mg tablet Take 1 Tablet by mouth daily. clemastine 2.68 mg tab tablet Take 2.68 mg by mouth two (2) times a day. pantoprazole (PROTONIX) 40 mg tablet Take 40 mg by mouth daily. montelukast sodium (SINGULAIR PO) Take 10 mg by mouth nightly.  10 mg tab    menthol-zinc oxide (CALMOSEPTINE) 0.44-20.6 % oint Calmoseptine 0.44 %-20.6 % topical ointment (Patient not taking: Reported on 9/21/2022)    levoFLOXacin (LEVAQUIN) 500 mg tablet levofloxacin 500 mg tablet (Patient not taking: No sig reported)    ciprofloxacin HCl (CIPRO) 250 mg tablet ciprofloxacin 250 mg tablet (Patient not taking: No sig reported)     No current facility-administered medications for this visit. Allergies   Allergen Reactions    Metformin Diarrhea    Statins-Hmg-Coa Reductase Inhibitors Myalgia     Myalgia with  multiple statins  Takes pravachol     Codeine Rash     Rash on thighs    Darvon [Propoxyphene] Other (comments)     \"I see worms\"    Pcn [Penicillins] Rash    Sulfa (Sulfonamide Antibiotics) Rash     Past Medical History:   Diagnosis Date    Abnormal LFTs 08/24/2017    FATTY LIVER    Arthritis     OSTEO    Avascular necrosis of hip ([facility] 08/24/2017    BILAT HIPS    Breast CA ([facility] 1989, 2001    BILATERAL; SURGERY, CHEMO    Chronic pain     CKD (chronic kidney disease), stage II 8/24/2017    Coagulation disorder ([facility] 1984    ITP  (DR CHU BRADSHAW) - PLATELETS DROPPED TO 5K    Depression     Diabetes ([facility] 2012    TYPE 2; NIDDM    DJD (degenerative joint disease), multiple sites 8/24/2017    GI bleed 2008    HEMORRHOIDS    Hyperlipemia     Hypertension with renal disease 8/24/2017    IBS (irritable bowel syndrome) 8/24/2017    Ill-defined condition 2015    PNEUMONIA X2 - HOSPITALIZED IN 2015    Interstitial lung disease ([facility] 04/01/2019    Liver disease     FATTY LIVER    Myalgia 8/24/2017    On statin therapy 8/24/2017    Overactive bladder 8/24/2017    Pneumonia 04/2015    HOSPITALIZED 3 WEEKS.     Polymyalgia rheumatica ([facility] 8/24/2017    Prophylactic antibiotic 8/24/2017    Reflex abnormality     acid reflex    Rosacea      Past Surgical History:   Procedure Laterality Date    HX APPENDECTOMY  1950    HX BREAST BIOPSY Bilateral     HX CATARACT REMOVAL Bilateral     HX COLONOSCOPY      HX ENDOSCOPY      HX GI      COLONOSCOPY    HX HEENT      WISDOM TEETH    HX HYSTERECTOMY  2000S    HX MASTECTOMY Right 1989    HX MASTECTOMY Left 2001    HX ORTHOPAEDIC  2008    LUMBAR FUSION    HX ORTHOPAEDIC  2018    RE-DO LUMBAR FUSION, AND ADDED THORACIC FUSION    HX ORTHOPAEDIC  03/26/2019    neckectomy    HX TUBAL LIGATION  1981    HX UROLOGICAL  2000s    BLADDER SLING    NC BREAST SURGERY PROCEDURE UNLISTED  ,     RECONSTRUCTION X2    NC TOTAL HIP ARTHROPLASTY Left 2016    ANTERIOR APPROACH, DR Gwendolyn De La Torre; (POSTOP: STANDS ONE INCH TALLER ON LEFT FOOT)    NC TOTAL HIP ARTHROPLASTY Right 2016    ANTERIOR APPROACH (DR Gwendolyn De La Torre)     Social History     Socioeconomic History    Marital status:      Spouse name: Not on file    Number of children: Not on file    Years of education: Not on file    Highest education level: Not on file   Occupational History    Not on file   Tobacco Use    Smoking status: Never    Smokeless tobacco: Never   Vaping Use    Vaping Use: Never used   Substance and Sexual Activity    Alcohol use: No    Drug use: No    Sexual activity: Never   Other Topics Concern    Not on file   Social History Narrative    Lives in [place] alone. Has one son in [place] and one daughter in [place]. . Used to work as an  for [employer] of education. Social Determinants of Health     Financial Resource Strain: Low Risk     Difficulty of Paying Living Expenses: Not hard at all   Food Insecurity: No Food Insecurity    Worried About Running Out of Food in the Last Year: Never true    Ran Out of Food in the Last Year: Never true   Transportation Needs: Not on file   Physical Activity: Not on file   Stress: Not on file   Social Connections: Not on file   Intimate Partner Violence: Not on file   Housing Stability: Not on file     Family History   Problem Relation Age of Onset    Heart Disease Mother              Kidney Disease Mother     Lung Disease Mother         COPD    Diabetes Brother     Pacemaker Brother     Kidney Disease Brother     Hypertension Brother     Anesth Problems Neg Hx      EXAM: CT SPINE CERV WO CONT  CLINICAL HISTORY: GLF  INDICATION: GLF  COMPARISON:  2019  TECHNIQUE:  Axial neck CT was performed. Noncontrast imaging obtained. Coronal  and sagittal reconstructions were performed. CT dose reduction was achieved through use of a standardized protocol tailored  for this examination and automatic exposure control for dose modulation. Osseous/bone algorithm was utilized. FINDINGS:  The vertebral bodies are anatomically aligned. There is no evidence of fracture  or subluxation. The prevertebral soft tissues are grossly within normal limits. The atlantodental interval is within normal limits. The craniocervical junction  is intact. ACDF at C5-6 and C6-7. No pneumothorax. No large pulmonary nodule. Multilevel  facet degenerative change. IMPRESSION  There is no acute fracture or dislocation identified. CLINICAL HISTORY: GLF  INDICATION: GLF  COMPARISON: 2019. CT dose reduction was achieved through use of a standardized protocol tailored  for this examination and automatic exposure control for dose modulation. TECHNIQUE: Serial axial images with a collimation of 5 mm were obtained from the  skull base through the vertex    FINDINGS:   There is sulcal and ventricular prominence. Confluent periventricular and  scattered foci of hypodensity in the cerebral white matter. There is no evidence  of an acute infarction, hemorrhage, or mass-effect. There is no evidence of  midline shift or hydrocephalus. Posterior fossa structures are unremarkable. No  extra-axial collections are seen. Mastoid air cells are well pneumatized and clear. There is no evidence of depressed skull fractures of soft tissue swelling. IMPRESSION  No acute intracranial process. Imaging findings consistent with minimal chronic microvascular ischemic change. There is a mild degree of cerebral atrophy.        PHYSICAL EXAMINATION:    Visit Vitals  /80 (BP 1 Location: Left upper arm, BP Patient Position: Sitting, BP Cuff Size: Large adult)   Pulse 80   Temp 97.7 °F (36.5 °C) (Temporal)   Resp 16   Ht 5' 3\" (1.6 m)   Wt 190 lb 9.6 oz (86.5 kg)   SpO2 96% BMI 33.76 kg/m²     General:  Well defined, nourished, and well groomed individual in no acute distress. Neck: Supple, nontender, normal range of motion. Musculoskeletal:  Extremities revealed no edema and had full range of motion of joints. Negative straight leg raise bilaterally. Psych:  Good mood and bright affect    NEUROLOGICAL EXAMINATION:     Mental Status:   Alert and oriented to person, place, and time with recent and remote memory intact. Attention span and concentration are normal. Clear speech. Fund of knowledge preserved. Cranial Nerves:   PERRLA. Visual fields were full  EOM: no evidence of nystagmus  Facial sensation:  normal and symmetric  Facial motor: normal and symmetric, no facial droop noted. Hearing intact  SCM strength intact  Tongue: midline without fasciculations    Motor Examination: Normal tone. 4+ /5 muscle strength in bilateral upper and lower extremities. Mild  cogwheel rigidity to left,  No muscle wasting, no twitching or fasciculation noted. Sensory exam:  Normal throughout to light touch in bilateral upper and lower extremities. Coordination:   Finger to nose and rapid arm movement testing was normal.  Mild  resting tremor noted to head/neck and B hands. Negative Romberg, negative pronator drift. Gait and Station: Patient was able to stand without assistance, she took several shuffling steps before she sat back down in wheelchair. Reflexes:  DTRs 1+ in bilateral biceps, brachioradialis, patella. ASSESSMENT AND PLAN      ICD-10-CM ICD-9-CM    1. Concussion without loss of consciousness, subsequent encounter  S06.0X0D V58.89      850.0       2. Essential tremor  G25.0 333.1       3. Length-dependent peripheral neuropathy  G62.9 356.9       4. Cerebral atrophy (HCC)  G31.9 331.9       5. Polypharmacy  Z79.899 V58.69       6. Falls, sequela  W19. XXXS 909.4      E929.3         1.  Concussion without loss of consciousness, subsequent encounter: Cervical CT and head CT reviewed with patient, discussed patient could greatly benefit from outpatient physical therapy concussion program, she is hesitant to do this as she is scheduled for ongoing home health, which she notes great benefit from. Patient declines any supplemental medication to help with headache related symptoms. Advised for a limited time she could try over-the-counter meds such as Excedrin, or dual action Tylenol with Advil to help. She is to monitor for any worsening signs or symptoms at which time she is to notify us or return to emergency room for further evaluation, consider brain MRI if prolonged symptoms. 2. Essential tremor: Continue Mysoline as prescribed, patient is on a low dose however she is hesitant to increase this further. 3. Length-dependent peripheral neuropathy: EMG results reviewed, patient is currently taking gabapentin, continue as prescribed for any increases. Monitor for any worsening. Continue with home health therapy. 4. Cerebral atrophy [facility]): Head CT reviewed with patient, consider brain MRI. She is monitor for any major changes or concerns. 5. Polypharmacy: I reviewed the patient's med list we both had concerns with nonmedication she is taking especially of the benzodiazepines, patient plans to have a follow-up appointment with primary care soon to discuss tapering off some of these medications. 6. Falls, sequela: Fall precautions recommended, continue with home health therapy. Patient and/or family verbalized understand of all instructions and all questions/concerns were addressed. Safety/side effects of medications discussed. Patient remains a complex patient secondary to polypharmacy, significant comorbid conditions, and use of high-risk medications which complicate the decision making process related to patient's neurologic diagnosis. I like to see the patient back in 3 months, sooner if needed.     Alanna Bunch, FNP-BC

## 2022-09-21 NOTE — LETTER
9/21/2022    Patient: Rusty Dozier   YOB: 1945   Date of Visit: 9/21/2022     Ludin Francois MD  [address]  [facility]  [address] [facility] [address]    Dear Ludin Francois MD,      Thank you for referring Ms. John Bueno to [address] [place] [address] for evaluation. My notes for this consultation are attached. If you have questions, please do not hesitate to call me. I look forward to following your patient along with you.       Sincerely,    Ellis Wynn NP

## 2022-09-28 DIAGNOSIS — M48.02 CERVICAL STENOSIS OF SPINE: ICD-10-CM

## 2022-09-28 DIAGNOSIS — M48.062 SPINAL STENOSIS OF LUMBAR REGION WITH NEUROGENIC CLAUDICATION: ICD-10-CM

## 2022-09-28 DIAGNOSIS — M51.9 LUMBAR DISC DISEASE: ICD-10-CM

## 2022-09-28 RX ORDER — OXYCODONE HYDROCHLORIDE 5 MG/1
5-10 TABLET ORAL
Qty: 60 TABLET | Refills: 0 | Status: SHIPPED | OUTPATIENT
Start: 2022-09-28 | End: 2022-10-01

## 2022-09-28 NOTE — TELEPHONE ENCOUNTER
PCP: Loy Kirby MD    Last appt: 7/26/2022  Future Appointments   Date Time Provider Gwendolyn Loving   10/17/2022 10:40 AM Loy Kirby MD PC[facility]   10/20/2022 10:30 AM Mila Ardon MD RDE [facility] [address] BS AMB   12/22/2022 11:00 AM Nowlin, Kristi Councilman, NP NEUM BS AMB       Requested Prescriptions     Pending Prescriptions Disp Refills    oxyCODONE IR (ROXICODONE) 5 mg immediate release tablet 60 Tablet 0     Sig: Take 1-2 Tablets by mouth every four (4) hours as needed for Pain for up to 3 days.  Max Daily Amount: 60 mg.       Prior labs and Blood pressures:  BP Readings from Last 3 Encounters:   09/21/22 130/80   09/09/22 122/68   07/26/22 128/82     Lab Results   Component Value Date/Time    Sodium 139 09/09/2022 02:37 PM    Potassium 3.7 09/09/2022 02:37 PM    Chloride 104 09/09/2022 02:37 PM    CO2 27 09/09/2022 02:37 PM    Anion gap 8 09/09/2022 02:37 PM    Glucose 273 (H) 09/09/2022 02:37 PM    BUN 17 09/09/2022 02:37 PM    Creatinine 0.81 09/09/2022 02:37 PM    BUN/Creatinine ratio 21 (H) 09/09/2022 02:37 PM    GFR est AA >60 09/09/2022 02:37 PM    GFR est non-AA >60 09/09/2022 02:37 PM    Calcium 8.6 09/09/2022 02:37 PM

## 2022-10-16 PROBLEM — K14.0 GLOSSITIS: Status: RESOLVED | Noted: 2019-11-26 | Resolved: 2022-10-16

## 2022-10-16 PROBLEM — R07.9 CHEST PAIN: Status: RESOLVED | Noted: 2022-07-26 | Resolved: 2022-10-16

## 2022-10-17 ENCOUNTER — OFFICE VISIT (OUTPATIENT)
Dept: INTERNAL MEDICINE CLINIC | Age: 77
End: 2022-10-17
Payer: MEDICARE

## 2022-10-17 VITALS
BODY MASS INDEX: 32.92 KG/M2 | TEMPERATURE: 98.9 F | SYSTOLIC BLOOD PRESSURE: 122 MMHG | DIASTOLIC BLOOD PRESSURE: 72 MMHG | WEIGHT: 185.8 LBS | HEIGHT: 63 IN | HEART RATE: 92 BPM | OXYGEN SATURATION: 94 %

## 2022-10-17 DIAGNOSIS — N18.2 CONTROLLED TYPE 2 DIABETES MELLITUS WITH STAGE 2 CHRONIC KIDNEY DISEASE, WITH LONG-TERM CURRENT USE OF INSULIN (HCC): ICD-10-CM

## 2022-10-17 DIAGNOSIS — E78.2 MIXED HYPERLIPIDEMIA: ICD-10-CM

## 2022-10-17 DIAGNOSIS — Z79.4 CONTROLLED TYPE 2 DIABETES MELLITUS WITH STAGE 2 CHRONIC KIDNEY DISEASE, WITH LONG-TERM CURRENT USE OF INSULIN (HCC): ICD-10-CM

## 2022-10-17 DIAGNOSIS — E66.09 CLASS 1 OBESITY DUE TO EXCESS CALORIES WITHOUT SERIOUS COMORBIDITY WITH BODY MASS INDEX (BMI) OF 33.0 TO 33.9 IN ADULT: ICD-10-CM

## 2022-10-17 DIAGNOSIS — I12.9 HYPERTENSION WITH RENAL DISEASE: Primary | ICD-10-CM

## 2022-10-17 DIAGNOSIS — M15.9 PRIMARY OSTEOARTHRITIS INVOLVING MULTIPLE JOINTS: ICD-10-CM

## 2022-10-17 DIAGNOSIS — E11.22 CONTROLLED TYPE 2 DIABETES MELLITUS WITH STAGE 2 CHRONIC KIDNEY DISEASE, WITH LONG-TERM CURRENT USE OF INSULIN (HCC): ICD-10-CM

## 2022-10-17 DIAGNOSIS — J96.11 CHRONIC HYPOXEMIC RESPIRATORY FAILURE (HCC): ICD-10-CM

## 2022-10-17 DIAGNOSIS — N18.2 CKD (CHRONIC KIDNEY DISEASE), STAGE II: ICD-10-CM

## 2022-10-17 DIAGNOSIS — J84.9 INTERSTITIAL LUNG DISEASE (HCC): ICD-10-CM

## 2022-10-17 DIAGNOSIS — Z23 NEEDS FLU SHOT: ICD-10-CM

## 2022-10-17 PROCEDURE — 1090F PRES/ABSN URINE INCON ASSESS: CPT | Performed by: INTERNAL MEDICINE

## 2022-10-17 PROCEDURE — 90694 VACC AIIV4 NO PRSRV 0.5ML IM: CPT | Performed by: INTERNAL MEDICINE

## 2022-10-17 PROCEDURE — G8427 DOCREV CUR MEDS BY ELIG CLIN: HCPCS | Performed by: INTERNAL MEDICINE

## 2022-10-17 PROCEDURE — G9717 DOC PT DX DEP/BP F/U NT REQ: HCPCS | Performed by: INTERNAL MEDICINE

## 2022-10-17 PROCEDURE — 1101F PT FALLS ASSESS-DOCD LE1/YR: CPT | Performed by: INTERNAL MEDICINE

## 2022-10-17 PROCEDURE — G8752 SYS BP LESS 140: HCPCS | Performed by: INTERNAL MEDICINE

## 2022-10-17 PROCEDURE — 3046F HEMOGLOBIN A1C LEVEL >9.0%: CPT | Performed by: INTERNAL MEDICINE

## 2022-10-17 PROCEDURE — 1123F ACP DISCUSS/DSCN MKR DOCD: CPT | Performed by: INTERNAL MEDICINE

## 2022-10-17 PROCEDURE — 2022F DILAT RTA XM EVC RTNOPTHY: CPT | Performed by: INTERNAL MEDICINE

## 2022-10-17 PROCEDURE — G8417 CALC BMI ABV UP PARAM F/U: HCPCS | Performed by: INTERNAL MEDICINE

## 2022-10-17 PROCEDURE — G8399 PT W/DXA RESULTS DOCUMENT: HCPCS | Performed by: INTERNAL MEDICINE

## 2022-10-17 PROCEDURE — 3017F COLORECTAL CA SCREEN DOC REV: CPT | Performed by: INTERNAL MEDICINE

## 2022-10-17 PROCEDURE — G8536 NO DOC ELDER MAL SCRN: HCPCS | Performed by: INTERNAL MEDICINE

## 2022-10-17 PROCEDURE — 99214 OFFICE O/P EST MOD 30 MIN: CPT | Performed by: INTERNAL MEDICINE

## 2022-10-17 PROCEDURE — G0008 ADMIN INFLUENZA VIRUS VAC: HCPCS | Performed by: INTERNAL MEDICINE

## 2022-10-17 PROCEDURE — G8754 DIAS BP LESS 90: HCPCS | Performed by: INTERNAL MEDICINE

## 2022-10-17 RX ORDER — PRIMIDONE 50 MG/1
50 TABLET ORAL 2 TIMES DAILY
Qty: 120 TABLET | Refills: 3 | Status: SHIPPED | OUTPATIENT
Start: 2022-10-17

## 2022-10-17 NOTE — PROGRESS NOTES
Per verbal orders from Dr. Pooja Ramos, patient received Fluad flu vaccine given IM in left deltoid given by Beth Vallejo. Vaccine was verified by Anju Crouch. Patient was observed for 15 minutes following injection with no complications noted. VIS was given.

## 2022-10-17 NOTE — PROGRESS NOTES
Chief Complaint   Patient presents with    Diabetes     3 month f/up    Hypertension    Chronic Kidney Disease    Cholesterol Problem       SUBJECTIVE:    Bernabe Araya is a 78 y.o. female who returns in follow-up for medical problems include hypertension, diabetes, hyperlipidemia, interstitial lung disease, allergic rhinitis, DJD, obesity, anxiety depression, tremor and other medical problems. She does note that the Mysoline taking 25 mg does not control her tremor she is wondering if she can increase it to 50 mg which I told her is perfectly fine. She currently denies any chest pain, shortness of breath or cardiac respiratory complaints. No GI or  complaints. There are no headaches, dizziness or neurologic complaints other than the tremor. There are no changes of her chronic arthritic complaints and there are no other complaints on complete review of systems. Current Outpatient Medications   Medication Sig Dispense Refill    doxycycline (VIBRAMYCIN) 100 mg capsule       collagenase (SANTYL) 250 unit/gram ointment Santyl 250 unit/gram topical ointment      clonazePAM (KlonoPIN) 1 mg tablet Take 1 mg by mouth daily. buPROPion XL (WELLBUTRIN XL) 150 mg tablet TAKE 1 TABLET DAILY (Patient taking differently: 150 mg two (2) times a day.) 90 Tablet 3    Insulin Needles, Disposable, (BD Ultra-Fine Short Pen Needle) 31 gauge x 5/16\" ndle USE UNDER THE SKIN DAILY WITH LANTUS SOLOSTAR PEN DAILY 90 Each 3    insulin NPH (HUMULIN N) 100 unit/mL (3 mL) inpn 65 U daily while on 30 mg of prednisone dose--Dose change 07/02/22--updated med list--did not send prescription to the pharmacy 3 Pen 5    zinc 50 mg tab tablet Take  by mouth daily. docosahexaenoic acid/epa (FISH OIL PO) Take  by mouth.       sertraline (ZOLOFT) 100 mg tablet TAKE 1 TABLET DAILY (Patient taking differently: Take 100 mg by mouth two (2) times a day.) 90 Tablet 3    potassium chloride (K-DUR, KLOR-CON M20) 20 mEq tablet Take 1 Tablet by mouth three (3) times daily. 270 Tablet 3    gabapentin (NEURONTIN) 100 mg capsule Take 1 Capsule by mouth three (3) times daily. Max Daily Amount: 300 mg. 90 Capsule 5    carvediloL (COREG) 6.25 mg tablet TAKE 1 TABLET TWICE A  Tablet 3    Dulera 200-5 mcg/actuation HFA inhaler Take 2 Puffs by inhalation two (2) times a day. LORazepam (ATIVAN) 1 mg tablet Take 1 Tablet by mouth every eight (8) hours as needed for Anxiety. Max Daily Amount: 3 mg. 90 Tablet 0    glucose blood VI test strips (True Metrix Glucose Test Strip) strip USE ONCE DAILY AND RECORD RESULTS IN A NOTEBOOK ALSO RECORD LAST MEALTIME IN NOTEBOOK 100 Strip 3    insulin glargine (Lantus Solostar U-100 Insulin) 100 unit/mL (3 mL) inpn INJECT 45 UNITS IN THE MORNING AND 36 UNITS AT NIGHT--Dose change 06/13/22--updated med list--did not send prescription to the pharmacy 10 Pen 3    primidone (Mysoline) 50 mg tablet Take 0.5 Tablets by mouth two (2) times a day. 60 Tablet 3    Ofev 150 mg cap two (2) times a day. predniSONE (DELTASONE) 10 mg tablet Take 3 tablets daily-Dose change 05/03/22--updated med list--did not send prescription to the pharmacy 360 Tablet 3    benzonatate (TESSALON) 200 mg capsule TAKE 1 CAPSULE BY MOUTH THREE TIMES DAILY AS NEEDED FOR COUGH - SWALLOW CAPSULE WHOLE (DO NOT CRUSH) 60 Capsule 1    hydroCHLOROthiazide (HYDRODIURIL) 25 mg tablet TAKE 1 TABLET DAILY FOR FLUID AND BLOOD PRESSURE 90 Tablet 3    albuterol (ProAir HFA) 90 mcg/actuation inhaler Take 1 Puff by inhalation every four (4) hours as needed for Wheezing or Shortness of Breath. 18 g 5    rosuvastatin (CRESTOR) 20 mg tablet Take 1 Tablet by mouth nightly. 90 Tablet 3    fenofibrate nanocrystallized (TRICOR) 145 mg tablet Take 1 Tablet by mouth daily. 90 Tablet 3    pantoprazole (PROTONIX) 40 mg tablet Take 40 mg by mouth daily. montelukast sodium (SINGULAIR PO) Take 10 mg by mouth nightly.  10 mg tab       Past Medical History: Diagnosis Date    Abnormal LFTs 08/24/2017    FATTY LIVER    Arthritis     OSTEO    Avascular necrosis of hip ([facility] [facility] 08/24/2017    BILAT HIPS    Breast CA ([facility] [address] [facility] 1989, 2001    BILATERAL; SURGERY, CHEMO    Chronic pain     CKD (chronic kidney disease), stage II 8/24/2017    Coagulation disorder ([facility] [facility] 1984    ITP  (DR CHU BRADSHAW) - PLATELETS DROPPED TO 5K    Depression     Diabetes ([facility] [address] 2012    TYPE 2; NIDDM    DJD (degenerative joint disease), multiple sites 8/24/2017    GI bleed 2008    HEMORRHOIDS    Hyperlipemia     Hypertension with renal disease 8/24/2017    IBS (irritable bowel syndrome) 8/24/2017    Ill-defined condition 2015    PNEUMONIA X2 - HOSPITALIZED IN 2015    Interstitial lung disease ([facility] [address] [facility] 04/01/2019    Liver disease     FATTY LIVER    Myalgia 8/24/2017    On statin therapy 8/24/2017    Overactive bladder 8/24/2017    Pneumonia 04/2015    HOSPITALIZED 3 WEEKS.     Polymyalgia rheumatica ([facility] [address] 8/24/2017    Prophylactic antibiotic 8/24/2017    Reflex abnormality     acid reflex    Rosacea      Past Surgical History:   Procedure Laterality Date    HX APPENDECTOMY  1950    HX BREAST BIOPSY Bilateral     HX CATARACT REMOVAL Bilateral     HX COLONOSCOPY      HX ENDOSCOPY      HX GI      COLONOSCOPY    HX HEENT      WISDOM TEETH    HX HYSTERECTOMY  2000S    HX MASTECTOMY Right 1989    HX MASTECTOMY Left 2001    HX ORTHOPAEDIC  2008    LUMBAR FUSION    HX ORTHOPAEDIC  2018    RE-DO LUMBAR FUSION, AND ADDED THORACIC FUSION    HX ORTHOPAEDIC  03/26/2019    neckectomy    HX TUBAL LIGATION  1981    HX UROLOGICAL  2000s    BLADDER SLING    HI BREAST SURGERY PROCEDURE UNLISTED  1993, 2002    RECONSTRUCTION X2    HI TOTAL HIP ARTHROPLASTY Left 11/16/2016    ANTERIOR APPROACH, DR Gwendolyn De La Torre; (POSTOP: STANDS ONE INCH TALLER ON LEFT FOOT)    HI TOTAL HIP ARTHROPLASTY Right 12/2016    ANTERIOR APPROACH (DR JAIME)     Allergies   Allergen Reactions    Metformin Diarrhea    Statins-Hmg-Coa Reductase Inhibitors Myalgia Myalgia with  multiple statins  Takes pravachol     Codeine Rash     Rash on thighs    Darvon [Propoxyphene] Other (comments)     \"I see worms\"    Pcn [Penicillins] Rash    Sulfa (Sulfonamide Antibiotics) Rash       REVIEW OF SYSTEMS:  General: negative for - chills or fever, or weight loss or gain  ENT: negative for - headaches, nasal congestion or tinnitus  Eyes: no blurred or visual changes  Neck: No stiffness or swollen nodes  Respiratory: negative for - cough, hemoptysis, shortness of breath or wheezing  Cardiovascular : negative for - chest pain, edema, palpitations or shortness of breath  Gastrointestinal: negative for - abdominal pain, blood in stools, heartburn or nausea/vomiting  Genito-Urinary: no dysuria, trouble voiding, or hematuria  Musculoskeletal: negative for - gait disturbance, joint pain, joint stiffness or joint swelling  Neurological: no TIA or stroke symptoms  Hematologic: no bruises, no bleeding  Lymphatic: no swollen glands  Integument: no lumps, mole changes, nail changes or rash  Endocrine:no malaise/lethargy poly uria or polydipsia or unexpected weight changes        Social History     Socioeconomic History    Marital status:    Tobacco Use    Smoking status: Never    Smokeless tobacco: Never   Vaping Use    Vaping Use: Never used   Substance and Sexual Activity    Alcohol use: No    Drug use: No    Sexual activity: Never   Social History Narrative    Lives in [facility]. Has one son in [place] and one daughter in [facility]. . Used to work as an  for [employer] of education.        Social Determinants of Health     Financial Resource Strain: Low Risk     Difficulty of Paying Living Expenses: Not hard at all   Food Insecurity: No Food Insecurity    Worried About Running Out of Food in the Last Year: Never true    Ran Out of Food in the Last Year: Never true     Family History   Problem Relation Age of Onset    Heart Disease Mother          2001    Kidney Disease Mother     Lung Disease Mother         COPD    Diabetes Brother     Pacemaker Brother     Kidney Disease Brother     Hypertension Brother     Anesth Problems Neg Hx        OBJECTIVE:     Visit Vitals  /72 (BP 1 Location: Left upper arm, BP Patient Position: Sitting, BP Cuff Size: Adult)   Pulse 92   Temp 98.9 °F (37.2 °C)   Ht 5' 3\" (1.6 m)   Wt 185 lb 12.8 oz (84.3 kg)   SpO2 94%   BMI 32.91 kg/m²     CONSTITUTIONAL:   well nourished, appears age appropriate  EYES: sclera anicteric, PERRL, EOMI  ENMT:nares clear, moist mucous membranes, pharynx clear  NECK: supple. Thyroid normal, No JVD or bruits  RESPIRATORY: Chest: clear to ascultation and percussion, normal inspiratory effort  CARDIOVASCULAR: Heart: regular rate and rhythm no murmurs, rubs or gallops, PMI not displaced, No thrills, no peripheral edema  GASTROINTESTINAL: Abdomen: non distended, soft, non tender, bowel sounds normal  HEMATOLOGIC: no purpura, petechiae or bruising  LYMPHATIC: No lymph node enlargemant  MUSCULOSKELETAL: Extremities: no active synovitis, pulse 1+   INTEGUMENT: No unusual rashes or suspicious skin lesions noted. Nails appear normal.  PERIPHERAL VASCULAR: normal pulses femoral, PT and DP  NEUROLOGIC: non-focal exam, A & O X 3. Resting tremor noted  PSYCHIATRIC:, appropriate affect     ASSESSMENT:   1. Hypertension with renal disease    2. Controlled type 2 diabetes mellitus with stage 2 chronic kidney disease, with long-term current use of insulin ([address]    3. Mixed hyperlipidemia    4. Interstitial lung disease ([address]    5. Chronic hypoxemic respiratory failure (HCC)    6. Primary osteoarthritis involving multiple joints    7. CKD (chronic kidney disease), stage II    8. Class 1 obesity due to excess calories without serious comorbidity with body mass index (BMI) of 33.0 to 33.9 in adult    9. Needs flu shot      Impression  1.   Hypertension that is controlled so continue current therapy reviewed with her.  2.  Diabetes repeat status pending prior lab reviewed  3. Hyperlipidemia prior lab reviewed repeat status pending  4. Interstitial lung disease due to have repeat PFTs and January  5. Chronic hypoxemic respiratory failure O2 sat today is 94% on room air  6. DJD chronic but stable  7 CKD stage II repeat status pending  8. Obesity I did discuss diet, exercise weight reduction for overall health benefit  Flu shot given today. Follow-up 3 months or sooner if there is a problem. I will call the lab results in the interim. PLAN:  .  Orders Placed This Encounter    Influenza, FLUAD, (age 72 y+), IM, PF, 0.5 mL    METABOLIC PANEL, COMPREHENSIVE    LIPID PANEL    CK    HEMOGLOBIN A1C WITH EAG         ATTENTION:   This medical record was transcribed using an electronic medical records system. Although proofread, it may and can contain electronic and spelling errors. Other human spelling and other errors may be present. Corrections may be executed at a later time. Please feel free to contact us for any clarifications as needed. Follow-up and Dispositions    Return in about 3 months (around 1/17/2023). No results found for any visits on 10/17/22. Ingrid Witt MD    The patient verbalized understanding of the problems and plans as explained.

## 2022-10-17 NOTE — PROGRESS NOTES
Chief Complaint   Patient presents with    Diabetes     3 month f/up    Hypertension    Chronic Kidney Disease    Cholesterol Problem      1. Have you been to the ER, urgent care clinic since your last visit? Hospitalized since your last visit? Went to [facility] ER for a fall; two sutures placed on her temple area. 2. Have you seen or consulted any other health care providers outside of the [address] [place] [address] since your last visit? Include any pap smears or colon screening.  No

## 2022-10-18 LAB
ALBUMIN SERPL-MCNC: 3.8 G/DL (ref 3.5–5)
ALBUMIN/GLOB SERPL: 1.3 {RATIO} (ref 1.1–2.2)
ALP SERPL-CCNC: 119 U/L (ref 45–117)
ALT SERPL-CCNC: 80 U/L (ref 12–78)
ANION GAP SERPL CALC-SCNC: 8 MMOL/L (ref 5–15)
AST SERPL-CCNC: 44 U/L (ref 15–37)
BILIRUB SERPL-MCNC: 0.8 MG/DL (ref 0.2–1)
BUN SERPL-MCNC: 22 MG/DL (ref 6–20)
BUN/CREAT SERPL: 28 (ref 12–20)
CALCIUM SERPL-MCNC: 9.2 MG/DL (ref 8.5–10.1)
CHLORIDE SERPL-SCNC: 102 MMOL/L (ref 97–108)
CHOLEST SERPL-MCNC: 244 MG/DL
CK SERPL-CCNC: 47 U/L (ref 26–192)
CO2 SERPL-SCNC: 30 MMOL/L (ref 21–32)
CREAT SERPL-MCNC: 0.8 MG/DL (ref 0.55–1.02)
EST. AVERAGE GLUCOSE BLD GHB EST-MCNC: 146 MG/DL
GLOBULIN SER CALC-MCNC: 3 G/DL (ref 2–4)
GLUCOSE SERPL-MCNC: 103 MG/DL (ref 65–100)
HBA1C MFR BLD: 6.7 % (ref 4–5.6)
HDLC SERPL-MCNC: 68 MG/DL
HDLC SERPL: 3.6 {RATIO} (ref 0–5)
LDLC SERPL CALC-MCNC: 120.8 MG/DL (ref 0–100)
POTASSIUM SERPL-SCNC: 3 MMOL/L (ref 3.5–5.1)
PROT SERPL-MCNC: 6.8 G/DL (ref 6.4–8.2)
SODIUM SERPL-SCNC: 140 MMOL/L (ref 136–145)
TRIGL SERPL-MCNC: 276 MG/DL (ref ?–150)
VLDLC SERPL CALC-MCNC: 55.2 MG/DL

## 2022-10-20 ENCOUNTER — OFFICE VISIT (OUTPATIENT)
Dept: ENDOCRINOLOGY | Age: 77
End: 2022-10-20
Payer: MEDICARE

## 2022-10-20 VITALS
BODY MASS INDEX: 30.88 KG/M2 | HEIGHT: 63 IN | WEIGHT: 174.3 LBS | HEART RATE: 68 BPM | SYSTOLIC BLOOD PRESSURE: 96 MMHG | DIASTOLIC BLOOD PRESSURE: 65 MMHG

## 2022-10-20 DIAGNOSIS — I10 ESSENTIAL HYPERTENSION, BENIGN: ICD-10-CM

## 2022-10-20 DIAGNOSIS — Z79.4 TYPE 2 DIABETES MELLITUS WITH MICROALBUMINURIA, WITH LONG-TERM CURRENT USE OF INSULIN (HCC): Primary | ICD-10-CM

## 2022-10-20 DIAGNOSIS — E78.5 HYPERLIPIDEMIA LDL GOAL <100: ICD-10-CM

## 2022-10-20 DIAGNOSIS — E11.22 CONTROLLED TYPE 2 DIABETES MELLITUS WITH STAGE 2 CHRONIC KIDNEY DISEASE, WITH LONG-TERM CURRENT USE OF INSULIN (HCC): ICD-10-CM

## 2022-10-20 DIAGNOSIS — Z79.4 CONTROLLED TYPE 2 DIABETES MELLITUS WITH STAGE 2 CHRONIC KIDNEY DISEASE, WITH LONG-TERM CURRENT USE OF INSULIN (HCC): ICD-10-CM

## 2022-10-20 DIAGNOSIS — N18.2 CONTROLLED TYPE 2 DIABETES MELLITUS WITH STAGE 2 CHRONIC KIDNEY DISEASE, WITH LONG-TERM CURRENT USE OF INSULIN (HCC): ICD-10-CM

## 2022-10-20 DIAGNOSIS — R80.9 TYPE 2 DIABETES MELLITUS WITH MICROALBUMINURIA, WITH LONG-TERM CURRENT USE OF INSULIN (HCC): Primary | ICD-10-CM

## 2022-10-20 DIAGNOSIS — E11.29 TYPE 2 DIABETES MELLITUS WITH MICROALBUMINURIA, WITH LONG-TERM CURRENT USE OF INSULIN (HCC): Primary | ICD-10-CM

## 2022-10-20 PROCEDURE — 3017F COLORECTAL CA SCREEN DOC REV: CPT | Performed by: INTERNAL MEDICINE

## 2022-10-20 PROCEDURE — 1090F PRES/ABSN URINE INCON ASSESS: CPT | Performed by: INTERNAL MEDICINE

## 2022-10-20 PROCEDURE — 3044F HG A1C LEVEL LT 7.0%: CPT | Performed by: INTERNAL MEDICINE

## 2022-10-20 PROCEDURE — G8536 NO DOC ELDER MAL SCRN: HCPCS | Performed by: INTERNAL MEDICINE

## 2022-10-20 PROCEDURE — 1101F PT FALLS ASSESS-DOCD LE1/YR: CPT | Performed by: INTERNAL MEDICINE

## 2022-10-20 PROCEDURE — G9717 DOC PT DX DEP/BP F/U NT REQ: HCPCS | Performed by: INTERNAL MEDICINE

## 2022-10-20 PROCEDURE — 2022F DILAT RTA XM EVC RTNOPTHY: CPT | Performed by: INTERNAL MEDICINE

## 2022-10-20 PROCEDURE — G8754 DIAS BP LESS 90: HCPCS | Performed by: INTERNAL MEDICINE

## 2022-10-20 PROCEDURE — G8399 PT W/DXA RESULTS DOCUMENT: HCPCS | Performed by: INTERNAL MEDICINE

## 2022-10-20 PROCEDURE — G8427 DOCREV CUR MEDS BY ELIG CLIN: HCPCS | Performed by: INTERNAL MEDICINE

## 2022-10-20 PROCEDURE — G8417 CALC BMI ABV UP PARAM F/U: HCPCS | Performed by: INTERNAL MEDICINE

## 2022-10-20 PROCEDURE — 99214 OFFICE O/P EST MOD 30 MIN: CPT | Performed by: INTERNAL MEDICINE

## 2022-10-20 PROCEDURE — G8752 SYS BP LESS 140: HCPCS | Performed by: INTERNAL MEDICINE

## 2022-10-20 PROCEDURE — 1123F ACP DISCUSS/DSCN MKR DOCD: CPT | Performed by: INTERNAL MEDICINE

## 2022-10-20 RX ORDER — ROSUVASTATIN CALCIUM 20 MG/1
20 TABLET, COATED ORAL
Qty: 90 TABLET | Refills: 3 | Status: SHIPPED | OUTPATIENT
Start: 2022-10-20

## 2022-10-20 RX ORDER — INSULIN GLARGINE 100 [IU]/ML
INJECTION, SOLUTION SUBCUTANEOUS
Qty: 10 PEN | Refills: 3
Start: 2022-10-20

## 2022-10-20 NOTE — PROGRESS NOTES
Chief Complaint   Patient presents with    Diabetes     Pcp and pharmacy verified     History of Present Illness: Radha Kaminski is a 78 y.o. female here for follow up of diabetes. Weight was down 25 lbs since last visit in person in 5/22 to 174 on our scales today but she was unsteady and did weight 185 lbs on 10/17/22 at Dr. Sonny Decker office so it's possible that this weight is not accurate and is lower than she really is. Has not been eating as much over the past few months and eating more of the right things. Still taking prednisone 30 mg in the morning and has only been taking 60 units of NPH not 65 units as her pen stops at 60 units. Still taking lantus 45 units in the morning and 36 units at night but has been getting more low sugars in the morning under 80 with some down to the 50s-60s. Has been staying under 200 in the evening. She is very unsteady on her feet and her BP is low so will stop the HCTZ. She may have missed some crestor at night to explain the rise in LDL. Current Outpatient Medications   Medication Sig    oxycodone HCl (OXYCONTIN PO) Take  by mouth. PRN    insulin NPH (HUMULIN N) 100 unit/mL (3 mL) inpn 60 U daily while on 30 mg of prednisone dose--Dose change 10/20/22--updated med list--did not send prescription to the pharmacy    primidone (Mysoline) 50 mg tablet Take 1 Tablet by mouth two (2) times a day. collagenase (SANTYL) 250 unit/gram ointment Santyl 250 unit/gram topical ointment    clonazePAM (KlonoPIN) 1 mg tablet Take 1 mg by mouth daily. buPROPion XL (WELLBUTRIN XL) 150 mg tablet TAKE 1 TABLET DAILY (Patient taking differently: 150 mg two (2) times a day.)    Insulin Needles, Disposable, (BD Ultra-Fine Short Pen Needle) 31 gauge x 5/16\" ndle USE UNDER THE SKIN DAILY WITH LANTUS SOLOSTAR PEN DAILY    zinc 50 mg tab tablet Take  by mouth daily. docosahexaenoic acid/epa (FISH OIL PO) Take  by mouth.     sertraline (ZOLOFT) 100 mg tablet TAKE 1 TABLET DAILY (Patient taking differently: Take 100 mg by mouth two (2) times a day.)    potassium chloride (K-DUR, KLOR-CON M20) 20 mEq tablet Take 1 Tablet by mouth three (3) times daily. gabapentin (NEURONTIN) 100 mg capsule Take 1 Capsule by mouth three (3) times daily. Max Daily Amount: 300 mg.    carvediloL (COREG) 6.25 mg tablet TAKE 1 TABLET TWICE A DAY    Dulera 200-5 mcg/actuation HFA inhaler Take 2 Puffs by inhalation two (2) times a day. LORazepam (ATIVAN) 1 mg tablet Take 1 Tablet by mouth every eight (8) hours as needed for Anxiety. Max Daily Amount: 3 mg.    glucose blood VI test strips (True Metrix Glucose Test Strip) strip USE ONCE DAILY AND RECORD RESULTS IN A NOTEBOOK ALSO RECORD LAST MEALTIME IN NOTEBOOK    insulin glargine (Lantus Solostar U-100 Insulin) 100 unit/mL (3 mL) inpn INJECT 45 UNITS IN THE MORNING AND 36 UNITS AT NIGHT--Dose change 06/13/22--updated med list--did not send prescription to the pharmacy    Ofev 150 mg cap two (2) times a day. predniSONE (DELTASONE) 10 mg tablet Take 3 tablets daily-Dose change 05/03/22--updated med list--did not send prescription to the pharmacy    benzonatate (TESSALON) 200 mg capsule TAKE 1 CAPSULE BY MOUTH THREE TIMES DAILY AS NEEDED FOR COUGH - SWALLOW CAPSULE WHOLE (DO NOT CRUSH)    hydroCHLOROthiazide (HYDRODIURIL) 25 mg tablet TAKE 1 TABLET DAILY FOR FLUID AND BLOOD PRESSURE    albuterol (ProAir HFA) 90 mcg/actuation inhaler Take 1 Puff by inhalation every four (4) hours as needed for Wheezing or Shortness of Breath. rosuvastatin (CRESTOR) 20 mg tablet Take 1 Tablet by mouth nightly. fenofibrate nanocrystallized (TRICOR) 145 mg tablet Take 1 Tablet by mouth daily. pantoprazole (PROTONIX) 40 mg tablet Take 40 mg by mouth daily. montelukast sodium (SINGULAIR PO) Take 10 mg by mouth nightly. 10 mg tab     No current facility-administered medications for this visit.      Allergies   Allergen Reactions    Metformin Diarrhea    Statins-Hmg-Coa Reductase Inhibitors Myalgia     Myalgia with  multiple statins  Takes pravachol     Codeine Rash     Rash on thighs    Darvon [Propoxyphene] Other (comments)     \"I see worms\"    Pcn [Penicillins] Rash    Sulfa (Sulfonamide Antibiotics) Rash     Review of Systems: PER HPI    Physical Examination:  Blood pressure 96/65, pulse 68, height 5' 3\" (1.6 m), weight 174 lb 4.8 oz (79.1 kg). General: pleasant, no distress, good eye contact   Neck: no carotid bruits  Cardiovascular: regular, normal rate, nl s1 and s2, no m/r/g,   Respiratory: clear bilaterally  Integumentary: no edema,   Psychiatric: normal mood and affect    Data Reviewed:   Component      Latest Ref Rng & Units 10/17/2022 10/17/2022 10/17/2022 10/17/2022          11:41 AM 11:41 AM 11:41 AM 11:41 AM   Sodium      136 - 145 mmol/L 140      Potassium      3.5 - 5.1 mmol/L 3.0 (L)      Chloride      97 - 108 mmol/L 102      CO2      21 - 32 mmol/L 30      Anion gap      5 - 15 mmol/L 8      Glucose      65 - 100 mg/dL 103 (H)      BUN      6 - 20 MG/DL 22 (H)      Creatinine      0.55 - 1.02 MG/DL 0.80      BUN/Creatinine ratio      12 - 20   28 (H)      eGFR      >60 ml/min/1.73m2 >60      Calcium      8.5 - 10.1 MG/DL 9.2      Bilirubin, total      0.2 - 1.0 MG/DL 0.8      ALT      12 - 78 U/L 80 (H)      AST      15 - 37 U/L 44 (H)      Alk. phosphatase      45 - 117 U/L 119 (H)      Protein, total      6.4 - 8.2 g/dL 6.8      Albumin      3.5 - 5.0 g/dL 3.8      Globulin      2.0 - 4.0 g/dL 3.0      A-G Ratio      1.1 - 2.2   1.3      Cholesterol, total      <200 MG/DL  244 (H)     Triglyceride      <150 MG/DL  276 (H)     HDL Cholesterol      MG/DL  68     LDL, calculated      0 - 100 MG/DL  120.8 (H)     VLDL, calculated      MG/DL  55.2     CHOL/HDL Ratio      0.0 - 5.0    3.6     Hemoglobin A1c, (calculated)      4.0 - 5.6 %    6.7 (H)   Est. average glucose      mg/dL    146   CK      26 - 192 U/L   47        Assessment/Plan:     1.  Type 2 diabetes mellitus with microalbuminuria, with long-term current use of insulin ([facility] [facility]): her most recent Hgb A1c was 6.7% in 10/22 down from 9.6% in 7/22 down from 11.1% in 4/22 and all values have been over 9% since 2020. Her lung disease is being managed with chronic high dose prednisone but given her weight loss, slightly decreased her NPH to cover the prednisone and also decreased her lantus to avoid fasting hypoglycemia. - decrease lantus to 40 units in the morning and 30 units at night  - decrease NPH to 55 units at the same time as 30 mg of prednisone  - check bs 4 times per day due to fluctuating sugars  - foot exam done 4/22  - microalbumin 83 in 4/22  - optho UTD 10/21  - check A1c with PCP        2. Essential hypertension, benign: her BP was at goal < 140/90 but on hypotensive side so stopped hctz  - stop hctz 25 mg daily  - cont coreg 6.25 mg bid          3. Hyperlipidemia LDL goal <100: LDL 97 and  in 4/22 on crestor 20 mg daily. LDL 78 and  in 7/22. LDL up to 120 and  in 10/22 with missing some doses. - cont crestor 20 mg daily   - check lipids with PCP     Patient Instructions   1) Stop the hydrochlorothiazide (hctz) as your blood pressure is too low now that you have lost weight. Keep taking carvedilol 6.25 mg twice daily. Watch for any new leg swelling with stopping this pill as we may need to have you go back on just 1/2 tab if the swelling comes back. 2) Dose your insulin as follows:    - Lantus: take 40 units in the morning and 30 units at night. This can be taken before or after eating. Watch your sugars in the morning. If you are staying between , then 30 units at night is a good dose.   If you are going under 90, then drop your evening dose by 4 units as needed to keep your morning sugars between .      - Humulin: take 55 units in the morning at the same time as the 30 mg of prednisone    3) Your Hemoglobin A1c (3 month test of blood sugar) is 6.7% down from 9.6% in 7/22 and 11.1% in 4/22. 4) Your LDL (bad cholesterol) did go up from 78 to 120 likely from missing some doses of rosuvastatin (generic crestor) so be sure to get 1 tab at bedtime everyday. [facility]. 5) Feel free to send me a message through [employer] with your blood sugar readings. Follow-up and Dispositions    Return in about 6 months (around 4/20/2023).                Copy sent to:  Gloria Merritt MD as PCP - General (Internal Medicine)  Mere Negro MD (Pulmonary Disease)

## 2022-10-20 NOTE — PATIENT INSTRUCTIONS
1) Stop the hydrochlorothiazide (hctz) as your blood pressure is too low now that you have lost weight. Keep taking carvedilol 6.25 mg twice daily. Watch for any new leg swelling with stopping this pill as we may need to have you go back on just 1/2 tab if the swelling comes back. 2) Dose your insulin as follows:    - Lantus: take 40 units in the morning and 30 units at night. This can be taken before or after eating. Watch your sugars in the morning. If you are staying between , then 30 units at night is a good dose. If you are going under 90, then drop your evening dose by 4 units as needed to keep your morning sugars between .      - Humulin: take 55 units in the morning at the same time as the 30 mg of prednisone    3) Your Hemoglobin A1c (3 month test of blood sugar) is 6.7% down from 9.6% in 7/22 and 11.1% in 4/22. 4) Your LDL (bad cholesterol) did go up from 78 to 120 likely from missing some doses of rosuvastatin (generic crestor) so be sure to get 1 tab at bedtime everyday. I [facility] a [facility]. 5) Feel free to send me a message through [employer] with your blood sugar readings.

## 2022-10-25 NOTE — PROGRESS NOTES
Patient is going to check her medications and make sure she is taking the Crestor 20 mg nightly as prescribed and call us back regarding.

## 2022-10-31 NOTE — PROGRESS NOTES
Results have been reviewed and abnormal results noted. Please see documentation in new EMR. Please call the patient regarding her abnormal result. Chol worse. Did she stop Crestor   Patient had called back to report she had stopped the crestor. Since then has restarted.

## 2022-11-04 ENCOUNTER — TELEPHONE (OUTPATIENT)
Dept: INTERNAL MEDICINE CLINIC | Age: 77
End: 2022-11-04

## 2022-11-04 NOTE — TELEPHONE ENCOUNTER
Patient is calling and wants the nurse know that she is not currently taking any cholesterol medication so she would need something called in or sent in to [address]   Patient requesting to speak with the nurse first.

## 2022-11-11 DIAGNOSIS — F41.9 ANXIETY: ICD-10-CM

## 2022-11-11 RX ORDER — LORAZEPAM 1 MG/1
TABLET ORAL
Qty: 90 TABLET | Refills: 0 | Status: SHIPPED | OUTPATIENT
Start: 2022-11-11

## 2022-11-11 NOTE — TELEPHONE ENCOUNTER
RX refill request from the patient/pharmacy. Patient last seen 10- with labs, and next appt. scheduled for 01-  Requested Prescriptions     Pending Prescriptions Disp Refills    LORazepam (ATIVAN) 1 mg tablet [Pharmacy Med Name: LORAZEPAM 1MG] 90 Tablet 0     Sig: TAKE 1 TABLET BY MOUTH EVERY EIGHT (8) HOURS AS NEEDED FOR ANXIETY. MAX DAILY AMOUNT: 3 TABLETS    .

## 2022-11-29 RX ORDER — CLOTRIMAZOLE AND BETAMETHASONE DIPROPIONATE 10; .64 MG/G; MG/G
CREAM TOPICAL 2 TIMES DAILY
Qty: 45 G | Refills: 1 | Status: SHIPPED | OUTPATIENT
Start: 2022-11-29

## 2022-11-29 NOTE — TELEPHONE ENCOUNTER
RX refill request from the patient/pharmacy. Patient last seen 10- with labs, and next appt. scheduled for 01-  Requested Prescriptions     Pending Prescriptions Disp Refills    clotrimazole-betamethasone (LOTRISONE) topical cream 45 g 1     Sig: Apply  to affected area two (2) times a day. Posey Chime

## 2022-12-13 RX ORDER — ROSUVASTATIN CALCIUM 20 MG/1
20 TABLET, COATED ORAL
Qty: 90 TABLET | Refills: 3 | Status: SHIPPED | OUTPATIENT
Start: 2022-12-13

## 2022-12-13 NOTE — TELEPHONE ENCOUNTER
RX refill request from the patient/pharmacy. Patient last seen 10- with labs, and next appt. scheduled for 01-  Requested Prescriptions     Pending Prescriptions Disp Refills    rosuvastatin (CRESTOR) 20 mg tablet 90 Tablet 3     Sig: Take 1 Tablet by mouth nightly. Eladio Tony

## 2022-12-13 NOTE — TELEPHONE ENCOUNTER
PCP: Richa Chatterjee MD    Last appt: 10/17/2022  Future Appointments   Date Time Provider Gwendolyn Loving   12/22/2022 11:00 AM Caron Recinos NP NEUM BS AMB   1/17/2023  9:30 AM Richa Chatterjee MD PCAM BS AMB   4/27/2023 11:30 AM Mahsa Sharpe MD RDE GLORIA 332 BS AMB       Requested Prescriptions     Pending Prescriptions Disp Refills    rosuvastatin (CRESTOR) 20 mg tablet 90 Tablet 3     Sig: Take 1 Tablet by mouth nightly.        Prior labs and Blood pressures:  BP Readings from Last 3 Encounters:   10/20/22 96/65   10/17/22 122/72   09/21/22 130/80     Lab Results   Component Value Date/Time    Sodium 140 10/17/2022 11:41 AM    Potassium 3.0 (L) 10/17/2022 11:41 AM    Chloride 102 10/17/2022 11:41 AM    CO2 30 10/17/2022 11:41 AM    Anion gap 8 10/17/2022 11:41 AM    Glucose 103 (H) 10/17/2022 11:41 AM    BUN 22 (H) 10/17/2022 11:41 AM    Creatinine 0.80 10/17/2022 11:41 AM    BUN/Creatinine ratio 28 (H) 10/17/2022 11:41 AM    GFR est AA >60 09/09/2022 02:37 PM    GFR est non-AA >60 09/09/2022 02:37 PM    Calcium 9.2 10/17/2022 11:41 AM

## 2022-12-22 ENCOUNTER — OFFICE VISIT (OUTPATIENT)
Dept: NEUROLOGY | Age: 77
End: 2022-12-22
Payer: MEDICARE

## 2022-12-22 VITALS
BODY MASS INDEX: 34.34 KG/M2 | DIASTOLIC BLOOD PRESSURE: 80 MMHG | RESPIRATION RATE: 16 BRPM | OXYGEN SATURATION: 96 % | TEMPERATURE: 97.7 F | SYSTOLIC BLOOD PRESSURE: 120 MMHG | HEIGHT: 63 IN | WEIGHT: 193.8 LBS | HEART RATE: 90 BPM

## 2022-12-22 DIAGNOSIS — G62.9 LENGTH-DEPENDENT PERIPHERAL NEUROPATHY: ICD-10-CM

## 2022-12-22 DIAGNOSIS — G31.9 CEREBRAL ATROPHY (HCC): ICD-10-CM
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
## 2022-12-22 DIAGNOSIS — R41.3 MEMORY LOSS: ICD-10-CM

## 2022-12-22 DIAGNOSIS — G25.0 ESSENTIAL TREMOR: Primary | ICD-10-CM

## 2022-12-22 RX ORDER — PANTOPRAZOLE SODIUM 20 MG/1
TABLET, DELAYED RELEASE ORAL
COMMUNITY
Start: 2022-09-27

## 2022-12-22 RX ORDER — LOPERAMIDE HYDROCHLORIDE 2 MG/1
CAPSULE ORAL
COMMUNITY
Start: 2022-12-09

## 2022-12-22 NOTE — PATIENT INSTRUCTIONS
Based on the prescription written by your primary care is appropriate to take the primidone twice a day, to help with her seizures. After a month or so if you are tolerating, and you would like for me to prescribe the Namenda to help with your memory please let me know and I will prescribe that. I have ordered the neuropsych test and I will see you afterwards.

## 2022-12-22 NOTE — PROGRESS NOTES
Chief Complaint   Patient presents with    Follow-up     Essential tremor     1. Have you been to the ER, urgent care clinic since your last visit? No  Hospitalized since your last visit? No    2. Have you seen or consulted any other health care providers outside of the [address] [place] [address] since your last visit? Yes GI  & Pulmonary Include any pap smears or colon screening. NO     Has concerns with Primidone for her tremors.

## 2022-12-22 NOTE — PROGRESS NOTES
[facility]  [facility] [address] [place] [address]  Yadira Estrada  Tel: [phone]  Fax: [phone]      Date:  22     Name:  Kenna Calero  :  [id number]  MRN:  [id number]     PCP:  Dusty Marr MD    Chief Complaint   Patient presents with    Follow-up     Essential tremor       HISTORY OF PRESENT ILLNESS:  Patient presents today for regular follow up. Biggest concern is her tremors as well as her memory loss. She is still with Homehealth Nursing 1 x week, no recent falls. Since 2018, her mobility and balance has worsened. Uses a wheelchair to get around. Dry needling helped with muscle spasms in the past.  PCP Dr Isi Kc: Kady Taveras Oxycodone PRN pain. .   Caregiver is with her daily. She is no longer driving. Primidone 50mg: was taking 1/2 tablet, he increased to 1 tablet  daily. Feels like she is losing her mind, used to be very sharp. Trouble finding her words, hard time remembering what she has eaten, needs help with meds, caregiver helps, forgets appointments. Recap from last visit 1. Concussion without loss of consciousness, subsequent encounter: Cervical CT and head CT reviewed with patient, discussed patient could greatly benefit from outpatient physical therapy concussion program, she is hesitant to do this as she is scheduled for ongoing home health, which she notes great benefit from. Patient declines any supplemental medication to help with headache related symptoms. Advised for a limited time she could try over-the-counter meds such as Excedrin, or dual action Tylenol with Advil to help. She is to monitor for any worsening signs or symptoms at which time she is to notify us or return to emergency room for further evaluation, consider brain MRI if prolonged symptoms. 2. Essential tremor: Continue Mysoline as prescribed, patient is on a low dose however she is hesitant to increase this further.   3. Length-dependent peripheral neuropathy: EMG results reviewed, patient is currently taking gabapentin, continue as prescribed for any increases. Monitor for any worsening. Continue with home health therapy. 4. Cerebral atrophy [facility]): Head CT reviewed with patient, consider brain MRI. She is monitor for any major changes or concerns. 5. Polypharmacy: I reviewed the patient's med list we both had concerns with nonmedication she is taking especially of the benzodiazepines, patient plans to have a follow-up appointment with primary care soon to discuss tapering off some of these medications. 6. Falls, sequela: Fall precautions recommended, continue with home health therapy. REVIEW OF SYSTEMS:     Review of Systems   Eyes: Negative. Gastrointestinal:  Positive for diarrhea. Trouble with swallowing liquids at times   Musculoskeletal:  Positive for falls (none since september). Neurological:  Positive for tremors and sensory change (legs and feet). Negative for dizziness, seizures and headaches. Psychiatric/Behavioral:  Positive for depression (controlled with meds) and memory loss. Negative for hallucinations. The patient is nervous/anxious (controlled with seizures). The patient does not have insomnia. All other systems reviewed and are negative. Current Outpatient Medications   Medication Sig    loperamide (IMODIUM) 2 mg capsule     rosuvastatin (CRESTOR) 20 mg tablet Take 1 Tablet by mouth nightly. clotrimazole-betamethasone (LOTRISONE) topical cream Apply  to affected area two (2) times a day. LORazepam (ATIVAN) 1 mg tablet TAKE 1 TABLET BY MOUTH EVERY EIGHT (8) HOURS AS NEEDED FOR ANXIETY. MAX DAILY AMOUNT: 3 TABLETS    oxycodone HCl (OXYCONTIN PO) Take  by mouth.  PRN    insulin NPH (HUMULIN N) 100 unit/mL (3 mL) inpn 55 U daily while on 30 mg of prednisone dose--Dose change 10/20/22--updated med list--did not send prescription to the pharmacy    insulin glargine (Lantus Solostar U-100 Insulin) 100 unit/mL (3 mL) inpn INJECT 40 UNITS IN THE MORNING AND 30 UNITS AT NIGHT--Dose change 10/20/22--updated med list--did not send prescription to the pharmacy    primidone (Mysoline) 50 mg tablet Take 1 Tablet by mouth two (2) times a day. collagenase (SANTYL) 250 unit/gram ointment Santyl 250 unit/gram topical ointment    clonazePAM (KlonoPIN) 1 mg tablet Take 1 mg by mouth daily. buPROPion XL (WELLBUTRIN XL) 150 mg tablet TAKE 1 TABLET DAILY (Patient taking differently: 150 mg two (2) times a day.)    Insulin Needles, Disposable, (BD Ultra-Fine Short Pen Needle) 31 gauge x 5/16\" ndle USE UNDER THE SKIN DAILY WITH LANTUS SOLOSTAR PEN DAILY    zinc 50 mg tab tablet Take  by mouth daily. docosahexaenoic acid/epa (FISH OIL PO) Take  by mouth. sertraline (ZOLOFT) 100 mg tablet TAKE 1 TABLET DAILY (Patient taking differently: Take 100 mg by mouth two (2) times a day.)    potassium chloride (K-DUR, KLOR-CON M20) 20 mEq tablet Take 1 Tablet by mouth three (3) times daily. gabapentin (NEURONTIN) 100 mg capsule Take 1 Capsule by mouth three (3) times daily. Max Daily Amount: 300 mg.    carvediloL (COREG) 6.25 mg tablet TAKE 1 TABLET TWICE A DAY    Dulera 200-5 mcg/actuation HFA inhaler Take 2 Puffs by inhalation two (2) times a day. glucose blood VI test strips (True Metrix Glucose Test Strip) strip USE ONCE DAILY AND RECORD RESULTS IN A NOTEBOOK ALSO RECORD LAST MEALTIME IN NOTEBOOK    Ofev 150 mg cap two (2) times a day. predniSONE (DELTASONE) 10 mg tablet Take 3 tablets daily-Dose change 05/03/22--updated med list--did not send prescription to the pharmacy    benzonatate (TESSALON) 200 mg capsule TAKE 1 CAPSULE BY MOUTH THREE TIMES DAILY AS NEEDED FOR COUGH - SWALLOW CAPSULE WHOLE (DO NOT CRUSH)    albuterol (ProAir HFA) 90 mcg/actuation inhaler Take 1 Puff by inhalation every four (4) hours as needed for Wheezing or Shortness of Breath.    pantoprazole (PROTONIX) 40 mg tablet Take 40 mg by mouth daily.     montelukast sodium (SINGULAIR PO) Take 10 mg by mouth nightly. 10 mg tab    pantoprazole (PROTONIX) 20 mg tablet     fenofibrate nanocrystallized (TRICOR) 145 mg tablet Take 1 Tablet by mouth daily. (Patient not taking: Reported on 12/22/2022)     No current facility-administered medications for this visit. Allergies   Allergen Reactions    Metformin Diarrhea    Statins-Hmg-Coa Reductase Inhibitors Myalgia     Myalgia with  multiple statins  Takes pravachol     Codeine Rash     Rash on thighs    Darvon [Propoxyphene] Other (comments)     \"I see worms\"    Pcn [Penicillins] Rash    Sulfa (Sulfonamide Antibiotics) Rash     Past Medical History:   Diagnosis Date    Abnormal LFTs 08/24/2017    FATTY LIVER    Arthritis     OSTEO    Avascular necrosis of hip ([facility] [address] 08/24/2017    BILAT HIPS    Breast CA ([facility] [address] 1989, 2001    BILATERAL; SURGERY, CHEMO    Chronic pain     CKD (chronic kidney disease), stage II 8/24/2017    Coagulation disorder ([facility] [address] 1984    ITP  (DR CHU BRADSHAW) - PLATELETS DROPPED TO 5K    Depression     Diabetes ([facility] [address] 2012    TYPE 2; NIDDM    DJD (degenerative joint disease), multiple sites 8/24/2017    GI bleed 2008    HEMORRHOIDS    Hyperlipemia     Hypertension with renal disease 8/24/2017    IBS (irritable bowel syndrome) 8/24/2017    Ill-defined condition 2015    PNEUMONIA X2 - HOSPITALIZED IN 2015    Interstitial lung disease ([facility] [address] 04/01/2019    Liver disease     FATTY LIVER    Myalgia 8/24/2017    On statin therapy 8/24/2017    Overactive bladder 8/24/2017    Pneumonia 04/2015    HOSPITALIZED 3 WEEKS.     Polymyalgia rheumatica ([facility] [address] 8/24/2017    Prophylactic antibiotic 8/24/2017    Reflex abnormality     acid reflex    Rosacea      Past Surgical History:   Procedure Laterality Date    HX APPENDECTOMY  1950    HX BREAST BIOPSY Bilateral     HX CATARACT REMOVAL Bilateral     HX COLONOSCOPY      HX ENDOSCOPY      HX GI      COLONOSCOPY    HX HEENT      WISDOM TEETH    HX HYSTERECTOMY  2000S    HX MASTECTOMY Right 1989    HX MASTECTOMY Left     HX ORTHOPAEDIC  2008    LUMBAR FUSION    HX ORTHOPAEDIC  2018    RE-DO LUMBAR FUSION, AND ADDED THORACIC FUSION    HX ORTHOPAEDIC  2019    neckectomy    HX TUBAL LIGATION      HX UROLOGICAL  2000s    BLADDER SLING    MI BREAST SURGERY PROCEDURE UNLISTED  ,     RECONSTRUCTION X2    [place] TOTAL HIP ARTHROPLASTY Left 2016    ANTERIOR APPROACH, DR Gwendolyn De La Torre; (POSTOP: STANDS ONE INCH TALLER ON LEFT FOOT)    [place] TOTAL HIP ARTHROPLASTY Right 2016    ANTERIOR APPROACH (DR Gwendolyn De La Torre)     Social History     Socioeconomic History    Marital status:      Spouse name: Not on file    Number of children: Not on file    Years of education: Not on file    Highest education level: Not on file   Occupational History    Not on file   Tobacco Use    Smoking status: Never    Smokeless tobacco: Never   Vaping Use    Vaping Use: Never used   Substance and Sexual Activity    Alcohol use: No    Drug use: No    Sexual activity: Never   Other Topics Concern    Not on file   Social History Narrative    Lives in [facility] alone. Has one son in [place] and one daughter in [facility]. . Used to work as an  for [employer] of education.        Social Determinants of Health     Financial Resource Strain: Low Risk     Difficulty of Paying Living Expenses: Not hard at all   Food Insecurity: No Food Insecurity    Worried About Running Out of Food in the Last Year: Never true    Ran Out of Food in the Last Year: Never true   Transportation Needs: Not on file   Physical Activity: Not on file   Stress: Not on file   Social Connections: Not on file   Intimate Partner Violence: Not on file   Housing Stability: Not on file     Family History   Problem Relation Age of Onset    Heart Disease Mother              Kidney Disease Mother     Lung Disease Mother         COPD    Diabetes Brother     Pacemaker Brother     Kidney Disease Brother     Hypertension Brother Anesth Problems Neg Hx          PHYSICAL EXAMINATION:    Visit Vitals  /80 (BP 1 Location: Left upper arm, BP Patient Position: Sitting, BP Cuff Size: Large adult)   Pulse 90   Temp 97.7 °F (36.5 °C) (Temporal)   Resp 16   Ht 5' 3\" (1.6 m)   Wt 193 lb 12.8 oz (87.9 kg)   SpO2 96%   BMI 34.33 kg/m²     General:  Well defined, nourished, and well groomed individual in no acute distress. Psych:  Good mood and bright affect, with her caregiver. NEUROLOGICAL EXAMINATION:     Mental Status:   Alert and oriented to person, place, and time with recent and remote memory intact. Attention span and concentration are normal. Clear speech. Fund of knowledge preserved. Pt spelled world correctly backwards. Clock test: Patient cordell a [place] correctly, placed all numbers in correct order with appropriate spacing however place the hour and minute hand incorrectly. 0/3 word recall. Cranial Nerves:   PERRLA. Visual fields were full  EOM: no evidence of nystagmus  Facial sensation:  normal and symmetric  Facial motor: normal and symmetric, no facial droop noted. Hearing intact  SCM strength intact  Tongue: midline without fasciculations    Motor Examination: Normal tone. 5/5 muscle strength in bilateral upper and lower extremities. No cogwheel rigidity. No muscle wasting, no twitching or fasciculation noted. Sensory exam:  Normal throughout to light touch, and vibration sense. Coordination:   Finger to nose and rapid arm movement testing was normal.  Mild resting tremor in bilateral hands but moderate  intention tremor noted in bilateral hands. Negative Romberg, negative pronator drift. Gait and Station: Slow with a walker. Reflexes:  DTRs 1+ in bilateral biceps, brachioradialis, patella. ASSESSMENT AND PLAN      ICD-10-CM ICD-9-CM    1. Essential tremor  G25.0 333.1       2.  Cerebral atrophy (HCC)  G31.9 331.9 REFERRAL TO NEUROPSYCHOLOGY      3. Memory loss  R41.3 780.93 REFERRAL TO NEUROPSYCHOLOGY      4. Length-dependent peripheral neuropathy  G62.9 356.9       5. Falls, sequela  W19. XXXS 909.4      E929.3         1. Essential tremor: Patient has a tremor to be quite bothersome, she has a primary care prescription for primidone 50 mg, Rx reads 50 mg twice a day advised her to try that and can increase further as she is tolerating. 2. Cerebral atrophy [facility]): Head CT results reviewed with patient, neuropsych testing has been ordered. -     REFERRAL TO NEUROPSYCHOLOGY  3. Memory loss: Neuropsych testing has been ordered, we have deferred the Aricept as patient unfortunately suffers from frequent diarrhea. Consider trial on Namenda, patient will defer at this time but will contact us if she would like to start. Patient is not driving. She does have a caregiver with her that helps with medication ministration, recalling appointments and helping with her day-to-day. -     REFERRAL TO NEUROPSYCHOLOGY  4. Length-dependent peripheral neuropathy: Patient denies any new symptoms or concerns, continue medications as prescribed, she is notify us of any worsening. Home exercise program as tolerated. Discussed physical therapy, she will defer at this time. 5. Falls, sequela: Fall precautions recommended, consider outpatient therapy, patient deferred. Patient and/or family verbalized understand of all instructions and all questions/concerns were addressed. Safety/side effects of medications discussed. Patient remains a complex patient secondary to polypharmacy, significant comorbid conditions, and use of high-risk medications which complicate the decision making process related to patient's neurologic diagnosis. I will see the patient back in approximately 8 months, sooner if needed.     Lety Villanueva, FERNANDA-BC

## 2023-01-16 NOTE — PROGRESS NOTES
Chief Complaint   Patient presents with    Hypertension     3 month follow up    Diabetes    Cholesterol Problem         SUBJECTIVE:    Moises Bellaym is a 70 y.o. female who returns in follow-up from multi medical problems include hypertension, diabetes, hyperlipidemia, IBS, allergic rhinitis, interstitial lung disease followed by pulmonary Dr. Fatoumata Barrera, chronic hypoxemic respiratory failure, history of avascular necrosis of her hip, DJD, CKD stage II, obesity, prior elevated liver enzymes, history of polymyalgia rheumatica, anxiety recurrent depression and other mild medical problems. She had recent pulmonary function test and were told that they actually were stable. She does have follow-up regular with her oncologist and all seems to be stable there. She denies any current chest pain, increased shortness of breath, palpitations, PND, orthopnea or other cardiac or respiratory complaints. She notes no change of her chronic GI or  complaints and does need a refill on her Imodium that she uses for her diarrhea. She has no headaches, dizziness or neurologic complaints. She has no current active arthritic complaints but does have her chronic joint aches and pains. There are no other complaints on complete review of systems. Current Outpatient Medications   Medication Sig Dispense Refill    loperamide (IMODIUM) 2 mg capsule Take 1 Capsule by mouth four (4) times daily as needed for Diarrhea. 120 Capsule 5    gabapentin (NEURONTIN) 100 mg capsule Take 1 Capsule by mouth three (3) times daily. Max Daily Amount: 300 mg. 90 Capsule 5    rosuvastatin (CRESTOR) 20 mg tablet Take 1 Tablet by mouth nightly. 90 Tablet 3    clotrimazole-betamethasone (LOTRISONE) topical cream Apply  to affected area two (2) times a day. 45 g 1    LORazepam (ATIVAN) 1 mg tablet TAKE 1 TABLET BY MOUTH EVERY EIGHT (8) HOURS AS NEEDED FOR ANXIETY.  MAX DAILY AMOUNT: 3 TABLETS 90 Tablet 0    oxycodone HCl (OXYCONTIN PO) Take  by mouth. PRN      insulin NPH (HUMULIN N) 100 unit/mL (3 mL) inpn 55 U daily while on 30 mg of prednisone dose--Dose change 10/20/22--updated med list--did not send prescription to the pharmacy 3 Pen 5    insulin glargine (Lantus Solostar U-100 Insulin) 100 unit/mL (3 mL) inpn INJECT 40 UNITS IN THE MORNING AND 30 UNITS AT NIGHT--Dose change 10/20/22--updated med list--did not send prescription to the pharmacy 10 Pen 3    primidone (Mysoline) 50 mg tablet Take 1 Tablet by mouth two (2) times a day. 120 Tablet 3    collagenase (SANTYL) 250 unit/gram ointment Santyl 250 unit/gram topical ointment      clonazePAM (KlonoPIN) 1 mg tablet Take 1 mg by mouth daily. buPROPion XL (WELLBUTRIN XL) 150 mg tablet TAKE 1 TABLET DAILY (Patient taking differently: 150 mg two (2) times a day.) 90 Tablet 3    Insulin Needles, Disposable, (BD Ultra-Fine Short Pen Needle) 31 gauge x 5/16\" ndle USE UNDER THE SKIN DAILY WITH LANTUS SOLOSTAR PEN DAILY 90 Each 3    zinc 50 mg tab tablet Take  by mouth daily. docosahexaenoic acid/epa (FISH OIL PO) Take  by mouth. sertraline (ZOLOFT) 100 mg tablet TAKE 1 TABLET DAILY (Patient taking differently: Take 100 mg by mouth two (2) times a day.) 90 Tablet 3    potassium chloride (K-DUR, KLOR-CON M20) 20 mEq tablet Take 1 Tablet by mouth three (3) times daily. 270 Tablet 3    carvediloL (COREG) 6.25 mg tablet TAKE 1 TABLET TWICE A  Tablet 3    Dulera 200-5 mcg/actuation HFA inhaler Take 2 Puffs by inhalation two (2) times a day. glucose blood VI test strips (True Metrix Glucose Test Strip) strip USE ONCE DAILY AND RECORD RESULTS IN A NOTEBOOK ALSO RECORD LAST MEALTIME IN NOTEBOOK 100 Strip 3    Ofev 150 mg cap two (2) times a day.       predniSONE (DELTASONE) 10 mg tablet Take 3 tablets daily-Dose change 05/03/22--updated med list--did not send prescription to the pharmacy 360 Tablet 3    benzonatate (TESSALON) 200 mg capsule TAKE 1 CAPSULE BY MOUTH THREE TIMES DAILY AS NEEDED FOR COUGH - SWALLOW CAPSULE WHOLE (DO NOT CRUSH) 60 Capsule 1    albuterol (ProAir HFA) 90 mcg/actuation inhaler Take 1 Puff by inhalation every four (4) hours as needed for Wheezing or Shortness of Breath. 18 g 5    montelukast sodium (SINGULAIR PO) Take 10 mg by mouth nightly. 10 mg tab      pantoprazole (PROTONIX) 40 mg tablet Take 40 mg by mouth daily. Past Medical History:   Diagnosis Date    Abnormal LFTs 08/24/2017    FATTY LIVER    Arthritis     OSTEO    Avascular necrosis of hip ([address] 08/24/2017    BILAT HIPS    Breast CA ([address] 1989, 2001    BILATERAL; SURGERY, CHEMO    Chronic pain     CKD (chronic kidney disease), stage II 8/24/2017    Coagulation disorder ([address] 1984    ITP  (DR CHU BRADSHAW) - PLATELETS DROPPED TO 5K    Depression     Diabetes ([address] 2012    TYPE 2; NIDDM    DJD (degenerative joint disease), multiple sites 8/24/2017    GI bleed 2008    HEMORRHOIDS    Hyperlipemia     Hypertension with renal disease 8/24/2017    IBS (irritable bowel syndrome) 8/24/2017    Ill-defined condition 2015    PNEUMONIA X2 - HOSPITALIZED IN 2015    Interstitial lung disease [facility] 04/01/2019    Liver disease     FATTY LIVER    Myalgia 8/24/2017    On statin therapy 8/24/2017    Overactive bladder 8/24/2017    Pneumonia 04/2015    HOSPITALIZED 3 WEEKS.     Polymyalgia rheumatica ([address] 8/24/2017    Prophylactic antibiotic 8/24/2017    Reflex abnormality     acid reflex    Rosacea      Past Surgical History:   Procedure Laterality Date    HX APPENDECTOMY  1950    HX BREAST BIOPSY Bilateral     HX CATARACT REMOVAL Bilateral     HX COLONOSCOPY      HX ENDOSCOPY      HX GI      COLONOSCOPY    HX HEENT      WISDOM TEETH    HX HYSTERECTOMY  2000S    HX MASTECTOMY Right 1989    HX MASTECTOMY Left 2001    HX ORTHOPAEDIC  2008    LUMBAR FUSION    HX ORTHOPAEDIC  2018    RE-DO LUMBAR FUSION, AND ADDED THORACIC FUSION    HX ORTHOPAEDIC  03/26/2019    neckectomy    HX TUBAL LIGATION  1981    HX UROLOGICAL  2000s    BLADDER SLING NC ARTHRP ACETBLR/PROX FEM PROSTC AGRFT/ALGRFT Left 11/16/2016    ANTERIOR APPROACH, DR Jonathan Carrillo; (POSTOP: STANDS ONE INCH TALLER ON LEFT FOOT)    NC ARTHRP ACETBLR/PROX FEM PROSTC AGRFT/ALGRFT Right 12/2016    ANTERIOR APPROACH (DR Jonathan Carrillo)    NC UNLISTED PROCEDURE BREAST  1993, 2002    RECONSTRUCTION X2     Allergies   Allergen Reactions    Metformin Diarrhea    Statins-Hmg-Coa Reductase Inhibitors Myalgia     Myalgia with  multiple statins  Takes pravachol     Codeine Rash     Rash on thighs    Darvon [Propoxyphene] Other (comments)     \"I see worms\"    Pcn [Penicillins] Rash    Sulfa (Sulfonamide Antibiotics) Rash       REVIEW OF SYSTEMS:  General: negative for - chills or fever, or weight loss or gain  ENT: negative for - headaches, nasal congestion or tinnitus  Eyes: no blurred or visual changes  Neck: No stiffness or swollen nodes  Respiratory: negative for - cough, hemoptysis, shortness of breath or wheezing  Cardiovascular : negative for - chest pain, edema, palpitations or shortness of breath  Gastrointestinal: negative for - abdominal pain, blood in stools, heartburn or nausea/vomiting  Genito-Urinary: no dysuria, trouble voiding, or hematuria  Musculoskeletal: negative for - gait disturbance, joint pain, joint stiffness or joint swelling  Neurological: no TIA or stroke symptoms  Hematologic: no bruises, no bleeding  Lymphatic: no swollen glands  Integument: no lumps, mole changes, nail changes or rash  Endocrine:no malaise/lethargy poly uria or polydipsia or unexpected weight changes        Social History     Socioeconomic History    Marital status:    Tobacco Use    Smoking status: Never    Smokeless tobacco: Never   Vaping Use    Vaping Use: Never used   Substance and Sexual Activity    Alcohol use: No    Drug use: No    Sexual activity: Never   Social History Narrative    Lives in [facility] alone. Has one son in [place] and one daughter in [place]. .   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 Social Determinants of Health     Financial Resource Strain: Low Risk     Difficulty of Paying Living Expenses: Not hard at all   Food Insecurity: No Food Insecurity    Worried About Running Out of Food in the Last Year: Never true    Ran Out of Food in the Last Year: Never true     Family History   Problem Relation Age of Onset    Heart Disease Mother              Kidney Disease Mother     Lung Disease Mother         COPD    Diabetes Brother     Pacemaker Brother     Kidney Disease Brother     Hypertension Brother     Anesth Problems Neg Hx        OBJECTIVE:     Visit Vitals  /88 (BP 1 Location: Right upper arm, BP Patient Position: Sitting, BP Cuff Size: Adult)   Pulse 74   Temp 98.8 °F (37.1 °C) (Oral)   Ht 5' 3\" (1.6 m)   Wt 196 lb (88.9 kg)   SpO2 96%   BMI 34.72 kg/m²     CONSTITUTIONAL:   well nourished, appears age appropriate  EYES: sclera anicteric, PERRL, EOMI  ENMT:nares clear, moist mucous membranes, pharynx clear  NECK: supple. Thyroid normal, No JVD or bruits  RESPIRATORY: Chest: clear to ascultation and percussion, normal inspiratory effort  CARDIOVASCULAR: Heart: regular rate and rhythm no murmurs, rubs or gallops, PMI not displaced, No thrills, no peripheral edema  GASTROINTESTINAL: Abdomen: non distended, soft, non tender, bowel sounds normal  HEMATOLOGIC: no purpura, petechiae or bruising  LYMPHATIC: No lymph node enlargemant  MUSCULOSKELETAL: Extremities: no active synovitis, pulse 1+. No diabetic foot changes noted. INTEGUMENT: No unusual rashes or suspicious skin lesions noted. Nails appear normal.  PERIPHERAL VASCULAR: normal pulses femoral, PT and DP  NEUROLOGIC: non-focal exam, A & O X 3. Normal distal sensation and proprioception all toes both feet. PSYCHIATRIC:, appropriate affect     ASSESSMENT:   1. Hypertension with renal disease    2.  Controlled type 2 diabetes mellitus with stage 2 chronic kidney disease, with long-term current use of insulin ([facility]    3. Mixed hyperlipidemia    4. Interstitial lung disease ([address]    5. CKD (chronic kidney disease), stage II    6. Chronic hypoxemic respiratory failure (HCC)    7. Avascular necrosis of bone of hip, unspecified laterality ([address]    8. Recurrent depression ([address]    9. Polymyalgia rheumatica ([facility]    10. Osteoporosis screening    11. Neuropathy      Impression  1. Hypertension that is controlled so continue current therapy reviewed with her. 2.  Diabetes repeat status pending prior lab reviewed  3. Hyperlipidemia prior lab reviewed repeat status pending  4. Interstitial lung disease O2 sat today room air is 96%. Recent PFTs by her pulmonologist unchanged according to the patient. 5.   CKD stage III repeat status pending  6. Chronic hypoxemic respiratory failure oxygen as noted is good today  7. Avascular necrosis of her hip seems to be stable  8. Recurrent depression stable  9. Polymyalgia rheumatica in remission  10. She is due for repeat bone density we will schedule that  11 neuropathy I reviewed renewed her gabapentin  Follow-up 3 months or sooner should to be a problem. I will call with lab results and make adjustments if necessary. PLAN:  .  Orders Placed This Encounter    DEXA BONE DENSITY STUDY AXIAL    METABOLIC PANEL, COMPREHENSIVE    LIPID PANEL    CK    HEMOGLOBIN A1C WITH EAG    loperamide (IMODIUM) 2 mg capsule    gabapentin (NEURONTIN) 100 mg capsule           ATTENTION:   This medical record was transcribed using an electronic medical records system. Although proofread, it may and can contain electronic and spelling errors. Other human spelling and other errors may be present. Corrections may be executed at a later time. Please feel free to contact us for any clarifications as needed. Follow-up and Dispositions    Return in about 3 months (around 4/17/2023). No results found for any visits on 01/17/23.     Raf Cavazos MD    The patient verbalized understanding of the problems and plans as explained.

## 2023-01-17 ENCOUNTER — OFFICE VISIT (OUTPATIENT)
Dept: INTERNAL MEDICINE CLINIC | Age: 78
End: 2023-01-17
Payer: MEDICARE

## 2023-01-17 VITALS
HEART RATE: 74 BPM | BODY MASS INDEX: 34.73 KG/M2 | WEIGHT: 196 LBS | OXYGEN SATURATION: 96 % | SYSTOLIC BLOOD PRESSURE: 130 MMHG | TEMPERATURE: 98.8 F | DIASTOLIC BLOOD PRESSURE: 88 MMHG | HEIGHT: 63 IN

## 2023-01-17 DIAGNOSIS — E78.2 MIXED HYPERLIPIDEMIA: ICD-10-CM

## 2023-01-17 DIAGNOSIS — J96.11 CHRONIC HYPOXEMIC RESPIRATORY FAILURE (HCC): ICD-10-CM

## 2023-01-17 DIAGNOSIS — I12.9 HYPERTENSION WITH RENAL DISEASE: Primary | ICD-10-CM

## 2023-01-17 DIAGNOSIS — F33.9 RECURRENT DEPRESSION (HCC): ICD-10-CM

## 2023-01-17 DIAGNOSIS — M35.3 POLYMYALGIA RHEUMATICA (HCC): ICD-10-CM

## 2023-01-17 DIAGNOSIS — Z79.4 CONTROLLED TYPE 2 DIABETES MELLITUS WITH STAGE 2 CHRONIC KIDNEY DISEASE, WITH LONG-TERM CURRENT USE OF INSULIN (HCC): ICD-10-CM

## 2023-01-17 DIAGNOSIS — N18.2 CONTROLLED TYPE 2 DIABETES MELLITUS WITH STAGE 2 CHRONIC KIDNEY DISEASE, WITH LONG-TERM CURRENT USE OF INSULIN (HCC): ICD-10-CM

## 2023-01-17 DIAGNOSIS — N18.2 CKD (CHRONIC KIDNEY DISEASE), STAGE II: ICD-10-CM

## 2023-01-17 DIAGNOSIS — J84.9 INTERSTITIAL LUNG DISEASE (HCC): ICD-10-CM

## 2023-01-17 DIAGNOSIS — Z13.820 OSTEOPOROSIS SCREENING: ICD-10-CM

## 2023-01-17 DIAGNOSIS — E11.22 CONTROLLED TYPE 2 DIABETES MELLITUS WITH STAGE 2 CHRONIC KIDNEY DISEASE, WITH LONG-TERM CURRENT USE OF INSULIN (HCC): ICD-10-CM

## 2023-01-17 DIAGNOSIS — G62.9 NEUROPATHY: ICD-10-CM

## 2023-01-17 DIAGNOSIS — M87.059 AVASCULAR NECROSIS OF BONE OF HIP, UNSPECIFIED LATERALITY (HCC): ICD-10-CM

## 2023-01-17 LAB
ALBUMIN SERPL-MCNC: 3.7 G/DL (ref 3.5–5)
ALBUMIN/GLOB SERPL: 1.4 (ref 1.1–2.2)
ALP SERPL-CCNC: 118 U/L (ref 45–117)
ALT SERPL-CCNC: 93 U/L (ref 12–78)
ANION GAP SERPL CALC-SCNC: 8 MMOL/L (ref 5–15)
AST SERPL-CCNC: 55 U/L (ref 15–37)
BILIRUB SERPL-MCNC: 0.7 MG/DL (ref 0.2–1)
BUN SERPL-MCNC: 16 MG/DL (ref 6–20)
BUN/CREAT SERPL: 22 (ref 12–20)
CALCIUM SERPL-MCNC: 8.7 MG/DL (ref 8.5–10.1)
CHLORIDE SERPL-SCNC: 105 MMOL/L (ref 97–108)
CHOLEST SERPL-MCNC: 161 MG/DL
CK SERPL-CCNC: 58 U/L (ref 26–192)
CO2 SERPL-SCNC: 28 MMOL/L (ref 21–32)
COMMENT, HOLDF: NORMAL
CREAT SERPL-MCNC: 0.74 MG/DL (ref 0.55–1.02)
EST. AVERAGE GLUCOSE BLD GHB EST-MCNC: 160 MG/DL
GLOBULIN SER CALC-MCNC: 2.7 G/DL (ref 2–4)
GLUCOSE SERPL-MCNC: 84 MG/DL (ref 65–100)
HBA1C MFR BLD: 7.2 % (ref 4–5.6)
HDLC SERPL-MCNC: 79 MG/DL
HDLC SERPL: 2 (ref 0–5)
LDLC SERPL CALC-MCNC: 56.6 MG/DL (ref 0–100)
POTASSIUM SERPL-SCNC: 4 MMOL/L (ref 3.5–5.1)
PROT SERPL-MCNC: 6.4 G/DL (ref 6.4–8.2)
SAMPLES BEING HELD,HOLD: NORMAL
SODIUM SERPL-SCNC: 141 MMOL/L (ref 136–145)
TRIGL SERPL-MCNC: 127 MG/DL (ref ?–150)
VLDLC SERPL CALC-MCNC: 25.4 MG/DL

## 2023-01-17 PROCEDURE — 1101F PT FALLS ASSESS-DOCD LE1/YR: CPT | Performed by: INTERNAL MEDICINE

## 2023-01-17 PROCEDURE — 1090F PRES/ABSN URINE INCON ASSESS: CPT | Performed by: INTERNAL MEDICINE

## 2023-01-17 PROCEDURE — G9717 DOC PT DX DEP/BP F/U NT REQ: HCPCS | Performed by: INTERNAL MEDICINE

## 2023-01-17 PROCEDURE — G8536 NO DOC ELDER MAL SCRN: HCPCS | Performed by: INTERNAL MEDICINE

## 2023-01-17 PROCEDURE — G8417 CALC BMI ABV UP PARAM F/U: HCPCS | Performed by: INTERNAL MEDICINE

## 2023-01-17 PROCEDURE — G8399 PT W/DXA RESULTS DOCUMENT: HCPCS | Performed by: INTERNAL MEDICINE

## 2023-01-17 PROCEDURE — 99214 OFFICE O/P EST MOD 30 MIN: CPT | Performed by: INTERNAL MEDICINE

## 2023-01-17 PROCEDURE — 1123F ACP DISCUSS/DSCN MKR DOCD: CPT | Performed by: INTERNAL MEDICINE

## 2023-01-17 PROCEDURE — G8427 DOCREV CUR MEDS BY ELIG CLIN: HCPCS | Performed by: INTERNAL MEDICINE

## 2023-01-17 RX ORDER — GABAPENTIN 100 MG/1
100 CAPSULE ORAL 3 TIMES DAILY
Qty: 90 CAPSULE | Refills: 5 | Status: SHIPPED | OUTPATIENT
Start: 2023-01-17

## 2023-01-17 RX ORDER — LOPERAMIDE HYDROCHLORIDE 2 MG/1
2 CAPSULE ORAL
Qty: 120 CAPSULE | Refills: 5 | Status: SHIPPED | OUTPATIENT
Start: 2023-01-17

## 2023-01-17 NOTE — PROGRESS NOTES
Antonieta Grover is a 70 y.o. female     Chief Complaint   Patient presents with    Hypertension     3 month follow up    Diabetes    Cholesterol Problem       Visit Vitals  /88 (BP 1 Location: Right upper arm, BP Patient Position: Sitting, BP Cuff Size: Adult)   Pulse 74   Temp 98.8 °F (37.1 °C) (Oral)   Ht 5' 3\" (1.6 m)   Wt 196 lb (88.9 kg)   SpO2 96%   BMI 34.72 kg/m²       Health Maintenance Due   Topic Date Due    COVID-19 Vaccine (1) Never done    Shingles Vaccine (1 of 2) Never done    Eye Exam Retinal or Dilated  10/26/2022         1. \"Have you been to the ER, urgent care clinic since your last visit? Hospitalized since your last visit? \" No    2. \"Have you seen or consulted any other health care providers outside of the [address] [place] [address] since your last visit? \"  Dr. Manuel Francis      3. For patients aged 39-70: Has the patient had a colonoscopy / FIT/ Cologuard? NA - based on age      If the patient is female:    4. For patients aged 41-77: Has the patient had a mammogram within the past 2 years? NA - based on age or sex      11. For patients aged 21-65: Has the patient had a pap smear?  NA - based on age or sex

## 2023-02-02 DIAGNOSIS — F41.9 ANXIETY: ICD-10-CM

## 2023-02-02 RX ORDER — LORAZEPAM 1 MG/1
TABLET ORAL
Qty: 90 TABLET | Refills: 0 | Status: SHIPPED | OUTPATIENT
Start: 2023-02-02

## 2023-02-02 NOTE — TELEPHONE ENCOUNTER
RX refill request from the patient/pharmacy. Patient last seen 01- with labs, and next appt. scheduled for 04-  Requested Prescriptions     Pending Prescriptions Disp Refills    LORazepam (ATIVAN) 1 mg tablet [Pharmacy Med Name: LORAZEPAM 1MG] 90 Tablet 0     Sig: TAKE 1 TABLET BY MOUTH EVERY EIGHT (8) HOURS AS NEEDED FOR ANXIETY    .

## 2023-02-13 RX ORDER — INSULIN GLARGINE 100 [IU]/ML
INJECTION, SOLUTION SUBCUTANEOUS
Qty: 45 ML | Refills: 3 | Status: SHIPPED | OUTPATIENT
Start: 2023-02-13

## 2023-02-13 NOTE — TELEPHONE ENCOUNTER
RX refill request from the patient/pharmacy. Patient last seen 01- with labs, and next appt. scheduled for 04-  Requested Prescriptions     Pending Prescriptions Disp Refills    insulin glargine (Lantus Solostar U-100 Insulin) 100 unit/mL (3 mL) inpn 45 mL 3     Sig: INJECT 50 UNITS DAILY    .

## 2023-02-14 DIAGNOSIS — R52 PAIN: Primary | ICD-10-CM

## 2023-02-14 RX ORDER — OXYCODONE HYDROCHLORIDE 5 MG/1
5 TABLET ORAL
Qty: 60 TABLET | Refills: 0 | Status: SHIPPED | OUTPATIENT
Start: 2023-02-14 | End: 2023-03-16

## 2023-02-14 NOTE — TELEPHONE ENCOUNTER
Requested Prescriptions     Pending Prescriptions Disp Refills    oxyCODONE IR (ROXICODONE) 5 mg immediate release tablet 60 Tablet 0     Sig: Take 1 Tablet by mouth every four (4) hours as needed for Pain for up to 30 days. Max Daily Amount: 30 mg.

## 2023-02-15 DIAGNOSIS — F41.9 ANXIETY: ICD-10-CM

## 2023-02-15 RX ORDER — LORAZEPAM 1 MG/1
TABLET ORAL
Qty: 90 TABLET | Refills: 0 | Status: SHIPPED | OUTPATIENT
Start: 2023-02-15

## 2023-02-21 RX ORDER — PREDNISONE 10 MG/1
TABLET ORAL
Qty: 360 TABLET | Refills: 3 | Status: SHIPPED | OUTPATIENT
Start: 2023-02-21

## 2023-02-21 NOTE — TELEPHONE ENCOUNTER
RX refill request from the patient/pharmacy. Patient last seen 01- with labs, and next appt. scheduled for 04-  Requested Prescriptions     Pending Prescriptions Disp Refills    predniSONE (DELTASONE) 10 mg tablet [Pharmacy Med Name: PREDNISONE TABS 10MG] 360 Tablet 3     Sig: TAKE 4 TABLETS DAILY    .

## 2023-03-10 ENCOUNTER — LAB ONLY (OUTPATIENT)
Dept: INTERNAL MEDICINE CLINIC | Age: 78
End: 2023-03-10

## 2023-03-10 ENCOUNTER — APPOINTMENT (OUTPATIENT)
Dept: INTERNAL MEDICINE CLINIC | Age: 78
End: 2023-03-10

## 2023-03-10 DIAGNOSIS — N39.0 FREQUENT UTI: ICD-10-CM

## 2023-03-10 DIAGNOSIS — N39.0 FREQUENT UTI: Primary | ICD-10-CM

## 2023-03-11 LAB
APPEARANCE UR: ABNORMAL
BACTERIA URNS QL MICRO: ABNORMAL /HPF
BILIRUB UR QL: NEGATIVE
CAOX CRY URNS QL MICRO: ABNORMAL
COLOR UR: ABNORMAL
EPITH CASTS URNS QL MICRO: ABNORMAL /LPF
GLUCOSE UR STRIP.AUTO-MCNC: NEGATIVE MG/DL
HGB UR QL STRIP: ABNORMAL
KETONES UR QL STRIP.AUTO: ABNORMAL MG/DL
LEUKOCYTE ESTERASE UR QL STRIP.AUTO: ABNORMAL
NITRITE UR QL STRIP.AUTO: POSITIVE
PH UR STRIP: 7.5 (ref 5–8)
PROT UR STRIP-MCNC: 300 MG/DL
RBC #/AREA URNS HPF: ABNORMAL /HPF (ref 0–5)
SP GR UR REFRACTOMETRY: 1.03 (ref 1–1.03)
UROBILINOGEN UR QL STRIP.AUTO: 1 EU/DL (ref 0.2–1)
WBC URNS QL MICRO: ABNORMAL /HPF (ref 0–4)

## 2023-03-12 ENCOUNTER — HOSPITAL ENCOUNTER (INPATIENT)
Age: 78
LOS: 5 days | Discharge: NURSING, REHAB OR LONG-TERM CARE | End: 2023-03-17
Attending: EMERGENCY MEDICINE | Admitting: INTERNAL MEDICINE
Payer: MEDICARE

## 2023-03-12 ENCOUNTER — APPOINTMENT (OUTPATIENT)
Dept: GENERAL RADIOLOGY | Age: 78
End: 2023-03-12
Attending: EMERGENCY MEDICINE
Payer: MEDICARE

## 2023-03-12 DIAGNOSIS — N30.01 ACUTE CYSTITIS WITH HEMATURIA: ICD-10-CM

## 2023-03-12 DIAGNOSIS — A41.9 SEVERE SEPSIS (HCC): Primary | ICD-10-CM

## 2023-03-12 DIAGNOSIS — E16.2 HYPOGLYCEMIA: ICD-10-CM

## 2023-03-12 DIAGNOSIS — R65.20 SEVERE SEPSIS (HCC): Primary | ICD-10-CM

## 2023-03-12 DIAGNOSIS — I21.4 NSTEMI (NON-ST ELEVATED MYOCARDIAL INFARCTION) (HCC): ICD-10-CM

## 2023-03-12 PROBLEM — J20.9 ACUTE BRONCHITIS: Status: RESOLVED | Noted: 2019-04-30 | Resolved: 2023-03-12

## 2023-03-12 PROBLEM — J01.00 ACUTE NON-RECURRENT MAXILLARY SINUSITIS: Status: RESOLVED | Noted: 2020-03-18 | Resolved: 2023-03-12

## 2023-03-12 PROBLEM — N39.0 UTI (URINARY TRACT INFECTION): Status: ACTIVE | Noted: 2023-03-12

## 2023-03-12 LAB
ALBUMIN SERPL-MCNC: 3.6 G/DL (ref 3.5–5)
ALBUMIN/GLOB SERPL: 0.9 (ref 1.1–2.2)
ALP SERPL-CCNC: 189 U/L (ref 45–117)
ALT SERPL-CCNC: 95 U/L (ref 12–78)
ANION GAP BLD CALC-SCNC: 11 (ref 10–20)
ANION GAP BLD CALC-SCNC: 6 (ref 10–20)
ANION GAP SERPL CALC-SCNC: 6 MMOL/L (ref 5–15)
APPEARANCE UR: ABNORMAL
AST SERPL-CCNC: 74 U/L (ref 15–37)
BACTERIA SPEC CULT: NORMAL
BACTERIA URNS QL MICRO: ABNORMAL /HPF
BASE EXCESS BLD CALC-SCNC: 1 MMOL/L
BASE EXCESS BLD CALC-SCNC: 2.7 MMOL/L
BASOPHILS # BLD: 0 K/UL (ref 0–0.1)
BASOPHILS NFR BLD: 0 % (ref 0–1)
BILIRUB SERPL-MCNC: 0.8 MG/DL (ref 0.2–1)
BILIRUB UR QL: NEGATIVE
BUN SERPL-MCNC: 22 MG/DL (ref 6–20)
BUN/CREAT SERPL: 25 (ref 12–20)
CA-I BLD-MCNC: 1.11 MMOL/L (ref 1.12–1.32)
CA-I BLD-MCNC: 1.13 MMOL/L (ref 1.12–1.32)
CALCIUM SERPL-MCNC: 9 MG/DL (ref 8.5–10.1)
CC UR VC: NORMAL
CHLORIDE BLD-SCNC: 100 MMOL/L (ref 100–108)
CHLORIDE BLD-SCNC: 104 MMOL/L (ref 100–108)
CHLORIDE SERPL-SCNC: 100 MMOL/L (ref 97–108)
CO2 BLD-SCNC: 27 MMOL/L (ref 19–24)
CO2 BLD-SCNC: 32 MMOL/L (ref 19–24)
CO2 SERPL-SCNC: 30 MMOL/L (ref 21–32)
COLOR UR: ABNORMAL
COMMENT, HOLDF: NORMAL
CREAT SERPL-MCNC: 0.87 MG/DL (ref 0.55–1.02)
CREAT UR-MCNC: 0.7 MG/DL (ref 0.6–1.3)
CREAT UR-MCNC: 0.7 MG/DL (ref 0.6–1.3)
DIFFERENTIAL METHOD BLD: ABNORMAL
EOSINOPHIL # BLD: 0 K/UL (ref 0–0.4)
EOSINOPHIL NFR BLD: 0 % (ref 0–7)
EPITH CASTS URNS QL MICRO: ABNORMAL /LPF
ERYTHROCYTE [DISTWIDTH] IN BLOOD BY AUTOMATED COUNT: 13.8 % (ref 11.5–14.5)
GLOBULIN SER CALC-MCNC: 4.1 G/DL (ref 2–4)
GLUCOSE BLD STRIP.AUTO-MCNC: 137 MG/DL (ref 65–117)
GLUCOSE BLD STRIP.AUTO-MCNC: 156 MG/DL (ref 74–106)
GLUCOSE BLD STRIP.AUTO-MCNC: 46 MG/DL (ref 65–117)
GLUCOSE BLD STRIP.AUTO-MCNC: 51 MG/DL (ref 65–117)
GLUCOSE BLD STRIP.AUTO-MCNC: 56 MG/DL (ref 65–117)
GLUCOSE BLD STRIP.AUTO-MCNC: 57 MG/DL (ref 65–117)
GLUCOSE BLD STRIP.AUTO-MCNC: 67 MG/DL (ref 65–117)
GLUCOSE BLD STRIP.AUTO-MCNC: 76 MG/DL (ref 74–106)
GLUCOSE BLD STRIP.AUTO-MCNC: 82 MG/DL (ref 65–117)
GLUCOSE BLD STRIP.AUTO-MCNC: 84 MG/DL (ref 65–117)
GLUCOSE BLD STRIP.AUTO-MCNC: 90 MG/DL (ref 65–117)
GLUCOSE BLD STRIP.AUTO-MCNC: 92 MG/DL (ref 65–117)
GLUCOSE BLD STRIP.AUTO-MCNC: 93 MG/DL (ref 65–117)
GLUCOSE BLD STRIP.AUTO-MCNC: 99 MG/DL (ref 65–117)
GLUCOSE SERPL-MCNC: 143 MG/DL (ref 65–100)
GLUCOSE UR STRIP.AUTO-MCNC: 250 MG/DL
HCO3 BLDA-SCNC: 27 MMOL/L
HCO3 BLDA-SCNC: 32 MMOL/L
HCT VFR BLD AUTO: 51.9 % (ref 35–47)
HGB BLD-MCNC: 18.2 G/DL (ref 11.5–16)
HGB UR QL STRIP: ABNORMAL
IMM GRANULOCYTES # BLD AUTO: 0 K/UL (ref 0–0.04)
IMM GRANULOCYTES NFR BLD AUTO: 0 % (ref 0–0.5)
KETONES UR QL STRIP.AUTO: ABNORMAL MG/DL
LACTATE BLD-SCNC: 1.47 MMOL/L (ref 0.4–2)
LACTATE BLD-SCNC: 2.36 MMOL/L (ref 0.4–2)
LEUKOCYTE ESTERASE UR QL STRIP.AUTO: ABNORMAL
LYMPHOCYTES # BLD: 1.1 K/UL (ref 0.8–3.5)
LYMPHOCYTES NFR BLD: 12 % (ref 12–49)
MCH RBC QN AUTO: 34.9 PG (ref 26–34)
MCHC RBC AUTO-ENTMCNC: 35.1 G/DL (ref 30–36.5)
MCV RBC AUTO: 99.6 FL (ref 80–99)
MONOCYTES # BLD: 0.7 K/UL (ref 0–1)
MONOCYTES NFR BLD: 7 % (ref 5–13)
NEUTS SEG # BLD: 7.5 K/UL (ref 1.8–8)
NEUTS SEG NFR BLD: 81 % (ref 32–75)
NITRITE UR QL STRIP.AUTO: POSITIVE
NRBC # BLD: 0 K/UL (ref 0–0.01)
NRBC BLD-RTO: 0 PER 100 WBC
PCO2 BLDV: 47.9 MMHG (ref 41–51)
PCO2 BLDV: 65.1 MMHG (ref 41–51)
PH BLDV: 7.3 (ref 7.32–7.42)
PH BLDV: 7.36 (ref 7.32–7.42)
PH UR STRIP: 8 (ref 5–8)
PLATELET # BLD AUTO: 157 K/UL (ref 150–400)
PMV BLD AUTO: 9.9 FL (ref 8.9–12.9)
PO2 BLDV: 13 MMHG (ref 25–40)
PO2 BLDV: 32 MMHG (ref 25–40)
POTASSIUM BLD-SCNC: 3.4 MMOL/L (ref 3.5–5.5)
POTASSIUM BLD-SCNC: 5.1 MMOL/L (ref 3.5–5.5)
POTASSIUM SERPL-SCNC: 3.6 MMOL/L (ref 3.5–5.1)
PROT SERPL-MCNC: 7.7 G/DL (ref 6.4–8.2)
PROT UR STRIP-MCNC: 100 MG/DL
RBC # BLD AUTO: 5.21 M/UL (ref 3.8–5.2)
RBC #/AREA URNS HPF: ABNORMAL /HPF (ref 0–5)
SAMPLES BEING HELD,HOLD: NORMAL
SAO2 % BLD: 12 %
SAO2 % BLD: 58 %
SERVICE CMNT-IMP: ABNORMAL
SERVICE CMNT-IMP: NORMAL
SODIUM BLD-SCNC: 138 MMOL/L (ref 136–145)
SODIUM BLD-SCNC: 142 MMOL/L (ref 136–145)
SODIUM SERPL-SCNC: 136 MMOL/L (ref 136–145)
SP GR UR REFRACTOMETRY: 1.02
SPECIMEN SITE: ABNORMAL
SPECIMEN SITE: ABNORMAL
TRI-PHOS CRY URNS QL MICRO: ABNORMAL
TROPONIN I SERPL HS-MCNC: 169 NG/L (ref 0–51)
UA: UC IF INDICATED,UAUC: ABNORMAL
UROBILINOGEN UR QL STRIP.AUTO: 1 EU/DL (ref 0.2–1)
WBC # BLD AUTO: 9.4 K/UL (ref 3.6–11)
WBC URNS QL MICRO: >100 /HPF (ref 0–4)

## 2023-03-12 PROCEDURE — 74011000250 HC RX REV CODE- 250: Performed by: INTERNAL MEDICINE

## 2023-03-12 PROCEDURE — 87040 BLOOD CULTURE FOR BACTERIA: CPT

## 2023-03-12 PROCEDURE — 84484 ASSAY OF TROPONIN QUANT: CPT

## 2023-03-12 PROCEDURE — 87086 URINE CULTURE/COLONY COUNT: CPT

## 2023-03-12 PROCEDURE — 74011000258 HC RX REV CODE- 258: Performed by: EMERGENCY MEDICINE

## 2023-03-12 PROCEDURE — 71045 X-RAY EXAM CHEST 1 VIEW: CPT

## 2023-03-12 PROCEDURE — 80053 COMPREHEN METABOLIC PANEL: CPT

## 2023-03-12 PROCEDURE — 96365 THER/PROPH/DIAG IV INF INIT: CPT

## 2023-03-12 PROCEDURE — 65270000046 HC RM TELEMETRY

## 2023-03-12 PROCEDURE — 87077 CULTURE AEROBIC IDENTIFY: CPT

## 2023-03-12 PROCEDURE — 36415 COLL VENOUS BLD VENIPUNCTURE: CPT

## 2023-03-12 PROCEDURE — 93005 ELECTROCARDIOGRAM TRACING: CPT

## 2023-03-12 PROCEDURE — 74011250636 HC RX REV CODE- 250/636: Performed by: EMERGENCY MEDICINE

## 2023-03-12 PROCEDURE — 87186 SC STD MICRODIL/AGAR DIL: CPT

## 2023-03-12 PROCEDURE — 82962 GLUCOSE BLOOD TEST: CPT

## 2023-03-12 PROCEDURE — 74011250637 HC RX REV CODE- 250/637: Performed by: INTERNAL MEDICINE

## 2023-03-12 PROCEDURE — 85025 COMPLETE CBC W/AUTO DIFF WBC: CPT

## 2023-03-12 PROCEDURE — 82947 ASSAY GLUCOSE BLOOD QUANT: CPT

## 2023-03-12 PROCEDURE — 81001 URINALYSIS AUTO W/SCOPE: CPT

## 2023-03-12 PROCEDURE — 99285 EMERGENCY DEPT VISIT HI MDM: CPT

## 2023-03-12 RX ORDER — IBUPROFEN 200 MG
4 TABLET ORAL AS NEEDED
Status: DISCONTINUED | OUTPATIENT
Start: 2023-03-12 | End: 2023-03-17 | Stop reason: HOSPADM

## 2023-03-12 RX ORDER — DEXTROSE MONOHYDRATE 100 MG/ML
125 INJECTION, SOLUTION INTRAVENOUS CONTINUOUS
Status: DISCONTINUED | OUTPATIENT
Start: 2023-03-12 | End: 2023-03-13

## 2023-03-12 RX ORDER — SODIUM CHLORIDE 0.9 % (FLUSH) 0.9 %
5-10 SYRINGE (ML) INJECTION AS NEEDED
Status: DISCONTINUED | OUTPATIENT
Start: 2023-03-12 | End: 2023-03-15 | Stop reason: SDUPTHER

## 2023-03-12 RX ORDER — SODIUM CHLORIDE 0.9 % (FLUSH) 0.9 %
5-40 SYRINGE (ML) INJECTION AS NEEDED
Status: DISCONTINUED | OUTPATIENT
Start: 2023-03-12 | End: 2023-03-17 | Stop reason: HOSPADM

## 2023-03-12 RX ORDER — GABAPENTIN 100 MG/1
100 CAPSULE ORAL 3 TIMES DAILY
Status: DISCONTINUED | OUTPATIENT
Start: 2023-03-12 | End: 2023-03-17 | Stop reason: HOSPADM

## 2023-03-12 RX ORDER — ACETAMINOPHEN 325 MG/1
650 TABLET ORAL
Status: DISCONTINUED | OUTPATIENT
Start: 2023-03-12 | End: 2023-03-17 | Stop reason: HOSPADM

## 2023-03-12 RX ORDER — MONTELUKAST SODIUM 10 MG/1
10 TABLET ORAL
COMMUNITY

## 2023-03-12 RX ORDER — DEXTROSE MONOHYDRATE AND SODIUM CHLORIDE 5; .9 G/100ML; G/100ML
150 INJECTION, SOLUTION INTRAVENOUS CONTINUOUS
Status: DISCONTINUED | OUTPATIENT
Start: 2023-03-12 | End: 2023-03-12

## 2023-03-12 RX ORDER — SERTRALINE HYDROCHLORIDE 100 MG/1
100 TABLET, FILM COATED ORAL 2 TIMES DAILY
COMMUNITY

## 2023-03-12 RX ORDER — BUPROPION HYDROCHLORIDE 150 MG/1
150 TABLET, EXTENDED RELEASE ORAL 2 TIMES DAILY
Status: DISCONTINUED | OUTPATIENT
Start: 2023-03-12 | End: 2023-03-17 | Stop reason: HOSPADM

## 2023-03-12 RX ORDER — SODIUM CHLORIDE 0.9 % (FLUSH) 0.9 %
5-40 SYRINGE (ML) INJECTION EVERY 8 HOURS
Status: DISCONTINUED | OUTPATIENT
Start: 2023-03-12 | End: 2023-03-17 | Stop reason: HOSPADM

## 2023-03-12 RX ORDER — CARVEDILOL 6.25 MG/1
6.25 TABLET ORAL 2 TIMES DAILY WITH MEALS
Status: DISCONTINUED | OUTPATIENT
Start: 2023-03-12 | End: 2023-03-15

## 2023-03-12 RX ORDER — PANTOPRAZOLE SODIUM 40 MG/1
40 TABLET, DELAYED RELEASE ORAL DAILY
Status: DISCONTINUED | OUTPATIENT
Start: 2023-03-13 | End: 2023-03-17 | Stop reason: HOSPADM

## 2023-03-12 RX ORDER — ROSUVASTATIN CALCIUM 20 MG/1
20 TABLET, COATED ORAL
Status: DISCONTINUED | OUTPATIENT
Start: 2023-03-12 | End: 2023-03-17 | Stop reason: HOSPADM

## 2023-03-12 RX ORDER — PREDNISONE 20 MG/1
40 TABLET ORAL
Status: DISCONTINUED | OUTPATIENT
Start: 2023-03-13 | End: 2023-03-17 | Stop reason: HOSPADM

## 2023-03-12 RX ORDER — SERTRALINE HYDROCHLORIDE 50 MG/1
100 TABLET, FILM COATED ORAL 2 TIMES DAILY
Status: DISCONTINUED | OUTPATIENT
Start: 2023-03-12 | End: 2023-03-17 | Stop reason: HOSPADM

## 2023-03-12 RX ORDER — ZINC SULFATE 50(220)MG
1 CAPSULE ORAL DAILY
Status: DISCONTINUED | OUTPATIENT
Start: 2023-03-13 | End: 2023-03-17 | Stop reason: HOSPADM

## 2023-03-12 RX ORDER — POLYETHYLENE GLYCOL 3350 17 G/17G
17 POWDER, FOR SOLUTION ORAL DAILY PRN
Status: DISCONTINUED | OUTPATIENT
Start: 2023-03-12 | End: 2023-03-17 | Stop reason: HOSPADM

## 2023-03-12 RX ORDER — BALSAM PERU/CASTOR OIL
OINTMENT (GRAM) TOPICAL 3 TIMES DAILY
Status: DISCONTINUED | OUTPATIENT
Start: 2023-03-12 | End: 2023-03-17 | Stop reason: HOSPADM

## 2023-03-12 RX ORDER — OXYCODONE HYDROCHLORIDE 5 MG/1
5 TABLET ORAL
Status: DISCONTINUED | OUTPATIENT
Start: 2023-03-12 | End: 2023-03-17 | Stop reason: HOSPADM

## 2023-03-12 RX ORDER — ACETAMINOPHEN 650 MG/1
650 SUPPOSITORY RECTAL
Status: DISCONTINUED | OUTPATIENT
Start: 2023-03-12 | End: 2023-03-17 | Stop reason: HOSPADM

## 2023-03-12 RX ORDER — PRIMIDONE 50 MG/1
50 TABLET ORAL 2 TIMES DAILY
Status: DISCONTINUED | OUTPATIENT
Start: 2023-03-12 | End: 2023-03-17 | Stop reason: HOSPADM

## 2023-03-12 RX ORDER — BUPROPION HYDROCHLORIDE 150 MG/1
150 TABLET, EXTENDED RELEASE ORAL 2 TIMES DAILY
COMMUNITY
End: 2023-03-21 | Stop reason: SDUPTHER

## 2023-03-12 RX ORDER — INSULIN GLARGINE 100 [IU]/ML
40 INJECTION, SOLUTION SUBCUTANEOUS DAILY
Status: ON HOLD | COMMUNITY
End: 2023-03-17 | Stop reason: SDUPTHER

## 2023-03-12 RX ORDER — HYDROCHLOROTHIAZIDE 25 MG/1
25 TABLET ORAL DAILY
COMMUNITY

## 2023-03-12 RX ORDER — DEXTROSE MONOHYDRATE 100 MG/ML
0-250 INJECTION, SOLUTION INTRAVENOUS AS NEEDED
Status: DISCONTINUED | OUTPATIENT
Start: 2023-03-12 | End: 2023-03-17 | Stop reason: HOSPADM

## 2023-03-12 RX ORDER — LORAZEPAM 1 MG/1
1 TABLET ORAL
Status: DISCONTINUED | OUTPATIENT
Start: 2023-03-12 | End: 2023-03-17 | Stop reason: HOSPADM

## 2023-03-12 RX ORDER — CHOLECALCIFEROL (VITAMIN D3) 50 MCG
1 CAPSULE ORAL DAILY
COMMUNITY

## 2023-03-12 RX ORDER — ENOXAPARIN SODIUM 100 MG/ML
40 INJECTION SUBCUTANEOUS DAILY
Status: DISCONTINUED | OUTPATIENT
Start: 2023-03-13 | End: 2023-03-17 | Stop reason: HOSPADM

## 2023-03-12 RX ORDER — ONDANSETRON 4 MG/1
4 TABLET, ORALLY DISINTEGRATING ORAL
Status: DISCONTINUED | OUTPATIENT
Start: 2023-03-12 | End: 2023-03-17 | Stop reason: HOSPADM

## 2023-03-12 RX ORDER — ONDANSETRON 2 MG/ML
4 INJECTION INTRAMUSCULAR; INTRAVENOUS
Status: DISCONTINUED | OUTPATIENT
Start: 2023-03-12 | End: 2023-03-17 | Stop reason: HOSPADM

## 2023-03-12 RX ORDER — MONTELUKAST SODIUM 10 MG/1
10 TABLET ORAL
Status: DISCONTINUED | OUTPATIENT
Start: 2023-03-12 | End: 2023-03-17 | Stop reason: HOSPADM

## 2023-03-12 RX ADMIN — CASTOR OIL AND BALSAM, PERU: 788; 87 OINTMENT TOPICAL at 22:52

## 2023-03-12 RX ADMIN — SODIUM CHLORIDE, PRESERVATIVE FREE 10 ML: 5 INJECTION INTRAVENOUS at 22:53

## 2023-03-12 RX ADMIN — SODIUM CHLORIDE 1 G: 900 INJECTION INTRAVENOUS at 11:19

## 2023-03-12 RX ADMIN — MONTELUKAST 10 MG: 10 TABLET, FILM COATED ORAL at 21:34

## 2023-03-12 RX ADMIN — CARVEDILOL 6.25 MG: 6.25 TABLET, FILM COATED ORAL at 17:03

## 2023-03-12 RX ADMIN — DEXTROSE MONOHYDRATE 125 ML/HR: 100 INJECTION, SOLUTION INTRAVENOUS at 13:45

## 2023-03-12 RX ADMIN — ROSUVASTATIN CALCIUM 20 MG: 20 TABLET, COATED ORAL at 21:34

## 2023-03-12 RX ADMIN — SODIUM CHLORIDE, PRESERVATIVE FREE 10 ML: 5 INJECTION INTRAVENOUS at 14:00

## 2023-03-12 RX ADMIN — ACETAMINOPHEN 325MG 650 MG: 325 TABLET ORAL at 17:04

## 2023-03-12 RX ADMIN — DEXTROSE MONOHYDRATE 125 ML/HR: 100 INJECTION, SOLUTION INTRAVENOUS at 23:59

## 2023-03-12 RX ADMIN — SERTRALINE 100 MG: 50 TABLET, FILM COATED ORAL at 21:34

## 2023-03-12 RX ADMIN — GABAPENTIN 100 MG: 100 CAPSULE ORAL at 17:04

## 2023-03-12 RX ADMIN — DEXTROSE AND SODIUM CHLORIDE 100 ML/HR: 5; 900 INJECTION, SOLUTION INTRAVENOUS at 11:39

## 2023-03-12 RX ADMIN — BUPROPION HYDROCHLORIDE 150 MG: 150 TABLET, EXTENDED RELEASE ORAL at 22:53

## 2023-03-12 RX ADMIN — LORAZEPAM 1 MG: 1 TABLET ORAL at 17:43

## 2023-03-12 RX ADMIN — SODIUM CHLORIDE 1000 ML: 9 INJECTION, SOLUTION INTRAVENOUS at 11:18

## 2023-03-12 RX ADMIN — PRIMIDONE 50 MG: 50 TABLET ORAL at 17:38

## 2023-03-12 RX ADMIN — GABAPENTIN 100 MG: 100 CAPSULE ORAL at 21:34

## 2023-03-12 NOTE — PROGRESS NOTES
Problem: Pressure Injury - Risk of  Goal: *Prevention of pressure injury  Description: Document Hari Scale and appropriate interventions in the flowsheet. Outcome: Progressing Towards Goal  Note: Pressure Injury Interventions:  Sensory Interventions: Assess changes in LOC, Discuss PT/OT consult with provider, Keep linens dry and wrinkle-free    Moisture Interventions: Absorbent underpads, Apply protective barrier, creams and emollients, Limit adult briefs, Minimize layers    Activity Interventions: Increase time out of bed, PT/OT evaluation    Mobility Interventions: Float heels, PT/OT evaluation    Nutrition Interventions: Document food/fluid/supplement intake    Friction and Shear Interventions: Apply protective barrier, creams and emollients, Minimize layers                Problem: Patient Education: Go to Patient Education Activity  Goal: Patient/Family Education  Outcome: Progressing Towards Goal     Problem: Falls - Risk of  Goal: *Absence of Falls  Description: Document Elmer Fall Risk and appropriate interventions in the flowsheet.   Outcome: Progressing Towards Goal  Note: Fall Risk Interventions:                                Problem: Patient Education: Go to Patient Education Activity  Goal: Patient/Family Education  Outcome: Progressing Towards Goal

## 2023-03-12 NOTE — ED TRIAGE NOTES
Assumed care of pt at this time. Pt arrives with EMS cc of hypoglycemia. Pt was found naked in her bed semi-conscious, AOx2 by her caregiver this morning. On EMS arrival BG was 38, after 250 ml of D10 given by EMS BG was 160. Caregiver told EMS that pt is insulin dependent and frequently misses meals. Currently Aox4 but lethargic. On monitor x3, call bell in reach.  Vital signs stable

## 2023-03-12 NOTE — ED NOTES
Bruising noted on pts R upper thigh. Pt states that that is where she administers her insulin and states that her endocrinologist told her to inject her insulin into her thigh.

## 2023-03-12 NOTE — PROGRESS NOTES
Transition of Care Plan:    RUR:11%   Disposition:awaiting PT/OT recs   Follow up appointments:to be scheduled   DME needed:none  Transportation at [place] vs TBD  [place] or means to access home:  yes      IM Medicare Letter:to be given prior to discharge   Is patient a [place] and connected with the [place]?   no             Caregiver Contact:son sourav shaw [phone]  Discharge Caregiver contacted prior to discharge? Yes   Care Conference needed?:  no  Previous HH/SNF/IPR: [facility]                    Reason for Admission:  severe sepsis                      RUR Score:      11%               Plan for utilizing home health:      TBD    PCP: First and Last name:  Ariella Limon MD     Name of Practice:    Are you a current patient: Yes/No:    Approximate date of last visit:    Can you participate in a virtual visit with your PCP:                     Current Advanced Directive/Advance Care Plan: Full Code      Healthcare Decision Maker:   Click here to complete [id number] including selection of the Healthcare Decision Maker Relationship (ie \"Primary\")                             Transition of Care Plan:                      Patient lives alone and has caregivers that come daily. Patient requires assistant with her ADLs/IADLs. Patient has has a RW, rollator, 2 canes, raised toilet seat. Patient uses [facility] and [facility] for prescriptions. Care Management Interventions  PCP Verified by CM: Yes  Palliative Care Criteria Met (RRAT>21 & CHF Dx)?: No  Mode of Transport at Discharge:  Other (see comment) (caregiver vs TBD)  Transition of Care Consult (CM Consult): Discharge Planning  MyChart Signup: No  Discharge Durable Medical Equipment: No  Health Maintenance Reviewed: Yes  Physical Therapy Consult: Yes  Occupational Therapy Consult: Yes  Speech Therapy Consult: No  Support Systems: Caregiver/Home Care Staff  Confirm Follow Up Transport: Family  [employer] [facility] Information Provided?: No  Discharge Location  Patient Expects to be Discharged to[de-identified] Home with family assistance    7:33 PM  AMISHA Schwarz

## 2023-03-12 NOTE — ED NOTES
POC BG taken twice. Pt recent POC Glucose is 93. Pt has had 8 ounces of orange juice and two packs of crackers.  Will start pt on dextrose with NS infusion per MD Powell.

## 2023-03-12 NOTE — ED PROVIDER NOTES
[facility] EMERGENCY DEPT  EMERGENCY DEPARTMENT ENCOUNTER       Pt Name: Allie Lund  MRN: [id number]  [place] 1945  Date of evaluation: 3/12/2023  Provider: Nehemiah Weiss MD   PCP: Nadya Medeiros MD  Note Started: 12:11 PM 3/12/23     CHIEF COMPLAINT       Chief Complaint   Patient presents with    Low Blood Sugar     Pt arrives with EMS cc of hypoglycemia. Pt was found naked in her bed semi-conscious, AOx2 by her caregiver this morning. On EMS arrival BG was 38, after 250 ml of D10 given by EMS BG was 160. Caregiver told EMS that pt is insulin dependent and frequently misses meals. Currently Aox4 but lethargic     HISTORY OF PRESENT ILLNESS: 1 or more elements      History From: Patient, EMS history limited by: Acute unresponsive state      Allie Lund is a 70 y.o. female who presents following acute episode of unresponsiveness. Last took Lantus 30 yesterday evening at 530 PM.  She was found unresponsive this morning, BGL by EMS 30, given D10 in route with significant improvement to her mental status. Patient recently treated for UTI, started oral antibiotics this past Monday, however they have escalated to more broad spectrum; she is unsure what she is currently taking. Has not had anything to eat today. She denies cough, abdominal pain. Reports an itchy rash to her vulva and gluteal cleft. Nursing Notes were all reviewed and agreed with or any disagreements were addressed in the HPI. REVIEW OF SYSTEMS      Positives and Pertinent negatives as per HPI.     PAST HISTORY     Past Medical History:  Past Medical History:   Diagnosis Date    Abnormal LFTs 08/24/2017    FATTY LIVER    Arthritis     OSTEO    Avascular necrosis of hip ([facility] [address] 08/24/2017    BILAT HIPS    Breast CA ([facility] [address] 1989, 2001    BILATERAL; SURGERY, CHEMO    Chronic pain     CKD (chronic kidney disease), stage II 8/24/2017    Coagulation disorder ([facility] [address] 1984    ITP  (DR CHU BRADSHAW) - PLATELETS DROPPED TO 5K    Depression     Diabetes ([facility] 2012    TYPE 2; NIDDM    DJD (degenerative joint disease), multiple sites 2017    GI bleed     HEMORRHOIDS    Hyperlipemia     Hypertension with renal disease 2017    IBS (irritable bowel syndrome) 2017    Ill-defined condition     PNEUMONIA X2 - HOSPITALIZED IN     Interstitial lung disease ([facility] 2019    Liver disease     FATTY LIVER    Myalgia 2017    On statin therapy 2017    Overactive bladder 2017    Pneumonia 2015    HOSPITALIZED 3 WEEKS.     Polymyalgia rheumatica ([facility] 2017    Prophylactic antibiotic 2017    Reflex abnormality     acid reflex    Rosacea        Past Surgical History:  Past Surgical History:   Procedure Laterality Date    HX APPENDECTOMY  1950    HX BREAST BIOPSY Bilateral     HX CATARACT REMOVAL Bilateral     HX COLONOSCOPY      HX ENDOSCOPY      HX GI      COLONOSCOPY    HX HEENT      WISDOM TEETH    HX HYSTERECTOMY  2000S    HX MASTECTOMY Right     HX MASTECTOMY Left     HX ORTHOPAEDIC  2008    LUMBAR FUSION    HX ORTHOPAEDIC  2018    RE-DO LUMBAR FUSION, AND ADDED THORACIC FUSION    HX ORTHOPAEDIC  2019    neckectomy    HX TUBAL LIGATION      HX UROLOGICAL  2000s    BLADDER SLING    [place] ARTHRP ACETBLR/PROX FEM PROSTC AGRFT/ALGRFT Left 2016    ANTERIOR APPROACH, DR Amy Zendejas; (POSTOP: STANDS ONE INCH TALLER ON LEFT FOOT)    [place] ARTHRP ACETBLR/PROX FEM PROSTC AGRFT/ALGRFT Right 2016    ANTERIOR APPROACH (DR Amy Zendejas)    [place] UNLISTED PROCEDURE BREAST  ,     RECONSTRUCTION X2       Family History:  Family History   Problem Relation Age of Onset    Heart Disease Mother          5    Kidney Disease Mother     Lung Disease Mother         COPD    Diabetes Brother     Pacemaker Brother     Kidney Disease Brother     Hypertension Brother     Anesth Problems Neg Hx        Social History:  Social History     Tobacco Use    Smoking status: Never    Smokeless tobacco: Never   Vaping Use    Vaping Use: Never used   Substance Use Topics    Alcohol use: No    Drug use: No       Allergies: Allergies   Allergen Reactions    Metformin Diarrhea    Statins-Hmg-Coa Reductase Inhibitors Myalgia     Myalgia with  multiple statins  Takes pravachol     Codeine Rash     Rash on thighs    Darvon [Propoxyphene] Other (comments)     \"I see worms\"    Pcn [Penicillins] Rash    Sulfa (Sulfonamide Antibiotics) Rash       CURRENT MEDICATIONS      Previous Medications    ALBUTEROL (PROAIR HFA) 90 MCG/ACTUATION INHALER    Take 1 Puff by inhalation every four (4) hours as needed for Wheezing or Shortness of Breath. BENZONATATE (TESSALON) 200 MG CAPSULE    TAKE 1 CAPSULE BY MOUTH THREE TIMES DAILY AS NEEDED FOR COUGH - SWALLOW CAPSULE WHOLE (DO NOT CRUSH)    BUPROPION XL (WELLBUTRIN XL) 150 MG TABLET    TAKE 1 TABLET DAILY    CARVEDILOL (COREG) 6.25 MG TABLET    TAKE 1 TABLET TWICE A DAY    CLONAZEPAM (KLONOPIN) 1 MG TABLET    Take 1 mg by mouth daily. CLOTRIMAZOLE-BETAMETHASONE (LOTRISONE) TOPICAL CREAM    Apply  to affected area two (2) times a day. COLLAGENASE (SANTYL) 250 UNIT/GRAM OINTMENT    Santyl 250 unit/gram topical ointment    DOCOSAHEXAENOIC ACID/EPA (FISH OIL PO)    Take  by mouth. DULERA 200-5 MCG/ACTUATION HFA INHALER    Take 2 Puffs by inhalation two (2) times a day. GABAPENTIN (NEURONTIN) 100 MG CAPSULE    Take 1 Capsule by mouth three (3) times daily. Max Daily Amount: 300 mg.     GLUCOSE BLOOD VI TEST STRIPS (TRUE METRIX GLUCOSE TEST STRIP) STRIP    USE ONCE DAILY AND RECORD RESULTS IN A NOTEBOOK ALSO RECORD LAST MEALTIME IN NOTEBOOK    INSULIN GLARGINE (LANTUS SOLOSTAR U-100 INSULIN) 100 UNIT/ML (3 ML) INPN    INJECT 50 UNITS DAILY    INSULIN NEEDLES, DISPOSABLE, (BD ULTRA-FINE SHORT PEN NEEDLE) 31 GAUGE X 5/16\" NDLE    USE UNDER THE SKIN DAILY WITH LANTUS SOLOSTAR PEN DAILY    INSULIN NPH (HUMULIN N) 100 UNIT/ML (3 ML) INPN    55 U daily while on 30 mg of prednisone dose--Dose change 10/20/22--updated med list--did not send prescription to the pharmacy    LOPERAMIDE (IMODIUM) 2 MG CAPSULE    Take 1 Capsule by mouth four (4) times daily as needed for Diarrhea. LORAZEPAM (ATIVAN) 1 MG TABLET    TAKE 1 TABLET BY MOUTH EVERY EIGHT (8) HOURS AS NEEDED FOR ANXIETY    MONTELUKAST SODIUM (SINGULAIR PO)    Take 10 mg by mouth nightly. 10 mg tab    OFEV 150 MG CAP    two (2) times a day. OXYCODONE HCL (OXYCONTIN PO)    Take  by mouth. PRN    OXYCODONE IR (ROXICODONE) 5 MG IMMEDIATE RELEASE TABLET    Take 1 Tablet by mouth every four (4) hours as needed for Pain for up to 30 days. Max Daily Amount: 30 mg. PANTOPRAZOLE (PROTONIX) 40 MG TABLET    Take 40 mg by mouth daily. POTASSIUM CHLORIDE (K-DUR, KLOR-CON M20) 20 MEQ TABLET    Take 1 Tablet by mouth three (3) times daily. PREDNISONE (DELTASONE) 10 MG TABLET    TAKE 4 TABLETS DAILY    PRIMIDONE (MYSOLINE) 50 MG TABLET    Take 1 Tablet by mouth two (2) times a day. ROSUVASTATIN (CRESTOR) 20 MG TABLET    Take 1 Tablet by mouth nightly. SERTRALINE (ZOLOFT) 100 MG TABLET    TAKE 1 TABLET DAILY    ZINC 50 MG TAB TABLET    Take  by mouth daily. SCREENINGS               No data recorded         PHYSICAL EXAM      ED Triage Vitals   ED Encounter Vitals Group      BP 03/12/23 1029 132/70      Pulse (Heart Rate) 03/12/23 1028 89      Resp Rate 03/12/23 1028 21      Temp 03/12/23 1028 98.8 °F (37.1 °C)      Temp src --       O2 Sat (%) 03/12/23 1029 95 %      Weight 03/12/23 1029 195 lb      Height 03/12/23 1029 5' 3\"        Physical Exam  Vitals and nursing note reviewed. Constitutional:       Appearance: Normal appearance. HENT:      Head: Normocephalic. Mouth/Throat:      Mouth: Mucous membranes are dry. Cardiovascular:      Rate and Rhythm: Normal rate and regular rhythm. Abdominal:      General: Abdomen is flat. Palpations: Abdomen is soft.    Genitourinary:     Comments: Erythema to vulva and bilateral gluteal cleft with satellite lesions  Musculoskeletal:      Right lower leg: No edema. Left lower leg: No edema. Skin:     General: Skin is warm and dry. Neurological:      Mental Status: She is alert. DIAGNOSTIC RESULTS   LABS:  Recent Results (from the past 12 hour(s))   EKG, 12 LEAD, INITIAL    Collection Time: 03/12/23 10:33 AM   Result Value Ref Range    Ventricular Rate 87 BPM    Atrial Rate 87 BPM    P-R Interval 200 ms    QRS Duration 114 ms    Q-T Interval 398 ms    QTC Calculation (Bezet) 478 ms    Calculated P Axis 10 degrees    Calculated R Axis 91 degrees    Calculated T Axis -16 degrees    Diagnosis       Normal sinus rhythm  Possible Left atrial enlargement  Rightward axis  Incomplete right bundle branch block  Septal infarct , age undetermined  ST & T wave abnormality, consider anterior ischemia  When compared with ECG of 11-DEC-2021 04:52,  Incomplete right bundle branch block has replaced Right bundle branch block  Septal infarct is now present  Criteria for Inferior infarct are no longer present     CBC WITH AUTOMATED DIFF    Collection Time: 03/12/23 10:37 AM   Result Value Ref Range    WBC 9.4 3.6 - 11.0 K/uL    RBC 5.21 (H) 3.80 - 5.20 M/uL    HGB 18.2 (H) 11.5 - 16.0 g/dL    HCT 51.9 (H) 35.0 - 47.0 %    MCV 99.6 (H) 80.0 - 99.0 FL    MCH 34.9 (H) 26.0 - 34.0 PG    MCHC 35.1 30.0 - 36.5 g/dL    RDW 13.8 11.5 - 14.5 %    PLATELET 044 012 - 337 K/uL    MPV 9.9 8.9 - 12.9 FL    NRBC 0.0 0  WBC    ABSOLUTE NRBC 0.00 0.00 - 0.01 K/uL    NEUTROPHILS 81 (H) 32 - 75 %    LYMPHOCYTES 12 12 - 49 %    MONOCYTES 7 5 - 13 %    EOSINOPHILS 0 0 - 7 %    BASOPHILS 0 0 - 1 %    IMMATURE GRANULOCYTES 0 0.0 - 0.5 %    ABS. NEUTROPHILS 7.5 1.8 - 8.0 K/UL    ABS. LYMPHOCYTES 1.1 0.8 - 3.5 K/UL    ABS. MONOCYTES 0.7 0.0 - 1.0 K/UL    ABS. EOSINOPHILS 0.0 0.0 - 0.4 K/UL    ABS. BASOPHILS 0.0 0.0 - 0.1 K/UL    ABS. IMM.  GRANS. 0.0 0.00 - 0.04 K/UL    DF AUTOMATED     METABOLIC PANEL, COMPREHENSIVE    Collection Time: 03/12/23 10:37 AM   Result Value Ref Range    Sodium 136 136 - 145 mmol/L    Potassium 3.6 3.5 - 5.1 mmol/L    Chloride 100 97 - 108 mmol/L    CO2 30 21 - 32 mmol/L    Anion gap 6 5 - 15 mmol/L    Glucose 143 (H) 65 - 100 mg/dL    BUN 22 (H) 6 - 20 MG/DL    Creatinine 0.87 0.55 - 1.02 MG/DL    BUN/Creatinine ratio 25 (H) 12 - 20      eGFR >60 >60 ml/min/1.73m2    Calcium 9.0 8.5 - 10.1 MG/DL    Bilirubin, total 0.8 0.2 - 1.0 MG/DL    ALT (SGPT) 95 (H) 12 - 78 U/L    AST (SGOT) 74 (H) 15 - 37 U/L    Alk. phosphatase 189 (H) 45 - 117 U/L    Protein, total 7.7 6.4 - 8.2 g/dL    Albumin 3.6 3.5 - 5.0 g/dL    Globulin 4.1 (H) 2.0 - 4.0 g/dL    A-G Ratio 0.9 (L) 1.1 - 2.2     TROPONIN-HIGH SENSITIVITY    Collection Time: 03/12/23 10:37 AM   Result Value Ref Range    Troponin-High Sensitivity 169 (HH) 0 - 51 ng/L   SAMPLES BEING HELD    Collection Time: 03/12/23 10:37 AM   Result Value Ref Range    SAMPLES BEING HELD HOLD1     COMMENT        Add-on orders for these samples will be processed based on acceptable specimen integrity and analyte stability, which may vary by analyte.    BLOOD GAS,CHEM8,LACTIC ACID POC    Collection Time: 03/12/23 10:37 AM   Result Value Ref Range    pH, venous (POC) 7.30 (L) 7.32 - 7.42      pCO2, venous (POC) 65.1 (H) 41 - 51 MMHG    pO2, venous (POC) 13 (L) 25 - 40 mmHg    BICARBONATE 32 mmol/L    Base excess (POC) 2.7 mmol/L    O2 SAT 12 %    Sodium,  136 - 145 MMOL/L    Potassium, POC 5.1 3.5 - 5.5 MMOL/L    Chloride,  100 - 108 MMOL/L    CO2, POC 32 (H) 19 - 24 MMOL/L    Anion gap, POC 6 (L) 10 - 20      Glucose,  (H) 74 - 106 MG/DL    Creatinine, POC 0.7 0.6 - 1.3 MG/DL    eGFR (POC) >60 >60 ml/min/1.73m2    Calcium, ionized (POC) 1.13 1.12 - 1.32 mmol/L    Lactic Acid (POC) 2.36 (HH) 0.40 - 2.00 mmol/L    Sample source VENOUS BLOOD      Critical value read back B0ST    URINALYSIS W/ REFLEX CULTURE    Collection Time: 03/12/23 10:55 AM    Specimen: Urine   Result Value Ref Range    Color YELLOW/STRAW      Appearance TURBID (A) CLEAR      Specific gravity 1.021      pH (UA) 8.0 5.0 - 8.0      Protein 100 (A) NEG mg/dL    Glucose 250 (A) NEG mg/dL    Ketone TRACE (A) NEG mg/dL    Bilirubin Negative NEG      Blood TRACE (A) NEG      Urobilinogen 1.0 0.2 - 1.0 EU/dL    Nitrites Positive (A) NEG      Leukocyte Esterase LARGE (A) NEG      WBC PENDING /hpf    RBC PENDING /hpf    Epithelial cells PENDING /lpf    Bacteria PENDING /hpf    UA:UC IF INDICATED PENDING    GLUCOSE, POC    Collection Time: 03/12/23 11:25 AM   Result Value Ref Range    Glucose (POC) 82 65 - 117 mg/dL    Performed by Blas Jones RN    GLUCOSE, POC    Collection Time: 03/12/23 11:36 AM   Result Value Ref Range    Glucose (POC) 93 65 - 117 mg/dL    Performed by Vernoa Ramírez RN         EKG: If performed, independent interpretation documented below in the MDM section     RADIOLOGY:  Non-plain film images such as CT, Ultrasound and MRI are read by the radiologist. Plain radiographic images are visualized and preliminarily interpreted by the ED Provider with the findings documented in the MDM section. Interpretation per the Radiologist below, if available at the time of this note:     No results found.       PROCEDURES   Unless otherwise noted below, none  Critical Care  Performed by: Zeyad Jacob MD  Authorized by: Zeyad Jacob MD     Critical care provider statement:     Critical care time (minutes):  45    Critical care time was exclusive of:  Separately billable procedures and treating other patients    Critical care was necessary to treat or prevent imminent or life-threatening deterioration of the following conditions:  Metabolic crisis and sepsis    Critical care was time spent personally by me on the following activities:  Ordering and review of laboratory studies, ordering and review of radiographic studies, pulse oximetry, re-evaluation of patient's condition, examination of patient, evaluation of patient's response to treatment and ordering and performing treatments and interventions    Care discussed with: admitting provider       CRITICAL CARE TIME   45 min    EMERGENCY DEPARTMENT COURSE and DIFFERENTIAL DIAGNOSIS/MDM   Vitals:    Vitals:    03/12/23 1114 03/12/23 1135 03/12/23 1145 03/12/23 1200   BP: 98/85 102/66 114/81 (!) 111/57   Pulse: 98 97 95 88   Resp: 18 20 19 22   Temp:       SpO2: 98% 99% 99% 99%   Weight:       Height:            Patient was given the following medications:  Medications   sodium chloride (NS) flush 5-10 mL (has no administration in time range)   sodium chloride 0.9 % bolus infusion 1,000 mL (1,000 mL IntraVENous New Bag 3/12/23 1118)   dextrose 5% and 0.9% NaCl infusion (100 mL/hr IntraVENous New Bag 3/12/23 1139)   cefTRIAXone (ROCEPHIN) 1 g in 0.9% sodium chloride (MBP/ADV) 50 mL MBP (0 g IntraVENous IV Completed 3/12/23 1149)       Medical Decision Making  Ddx hypoglycemia due to underlying sepsis, MI    Rash in perineum is consistent with candida vulvovaginitis. Will obtain urine, continue to trend glucose here and orally replete. If she continues to drop, will start on dextrose containing fluid. Afebrile, however borderline HR with concern for sepsis, will obtain blood cultures, lactate, CBC, CMP, EKG, troponin. Plan admission following initially resuscitation. Amount and/or Complexity of Data Reviewed  Labs: ordered. Decision-making details documented in ED Course. Radiology: ordered and independent interpretation performed. Decision-making details documented in ED Course. ECG/medicine tests: ordered and independent interpretation performed. Decision-making details documented in ED Course. Risk  Prescription drug management. Decision regarding hospitalization.       ED Course as of 03/12/23 1211   Sun Mar 12, 2023   1054 PCO2 is mildly evident 65 pH is low 7.30 lactate is elevated 2.36 she is only 1 SIRS criteria with a heart rate of 91 however will obtain blood cultures given recent UTI [WB]   1109 Blood pressures dropped 90s over 50s, will give 1 L IV fluid bolus [WB]   1110 Normal white blood cell count neutrophil predominance [WB]   1110 [facility] ED SEPSIS NOTE:     11:10 AM The patient now meets criteria for: Severe Sepsis    Fluid resuscitation with: Patient does not require a 30cc/kg bolus because they do not meet criteria for septic shock (SBP<90 or decreased by >40 mmHG from baseline, MAP<65, Lactate 4 or greater). Due to concern for rapidly advancing infection and deterioration of patient's condition, antibiotics are started STAT and cultures ordered.   [WB]   1159 Troponin elevated 169 [WB]   1205 Urine is turbid with positive nitrites large leukocyte esterase ceftriaxone has been given [WB]   1205 Troponin 169 [WB]   1205 Hospitalist paged for admission [WB]   1211 Patient accepted for admission by hospitalist [WB]      ED Course User Index  [WB] Henry Obrien MD     FINAL IMPRESSION     1. Severe sepsis ([address]    2. Acute cystitis with hematuria    3. Hypoglycemia    4. NSTEMI (non-ST elevated myocardial infarction) [facility])       DISPOSITION/PLAN   Magdalene Singh's  results have been reviewed with her. She has been counseled regarding her diagnosis, treatment, and plan. She verbally conveys understanding and agreement of the signs, symptoms, diagnosis, treatment and prognosis and additionally agrees to follow up as discussed. She also agrees with the care-plan and conveys that all of her questions have been answered. I have also provided discharge instructions for her that include: educational information regarding their diagnosis and treatment, and list of reasons why they would want to return to the ED prior to their follow-up appointment, should her condition change. CLINICAL IMPRESSION    Admit Note: Pt is being admitted by Dr Vanesa Bailon. The results of their tests and reason(s) for their admission have been discussed with pt and/or available family. They convey agreement and understanding for the need to be admitted and for the admission diagnosis. I am the Primary Clinician of Record. Carmelo Guerra MD (electronically signed)    (Please note that parts of this dictation were completed with voice recognition software. Quite often unanticipated grammatical, syntax, homophones, and other interpretive errors are inadvertently transcribed by the computer software. Please disregards these errors.  Please excuse any errors that have escaped final proofreading.)

## 2023-03-12 NOTE — ED NOTES
Pt found to have redness and excoriation with possible yeast infection on buttocks and perineal area. Pt states that the area is hurting 3/10 on pain scale. Barrier cream applied, incontinence care provided, MD aware. Pt states she was dxd with a UTI on Monday and was prescribed antibiotics. She does not remember what antibiotic was prescribed.

## 2023-03-12 NOTE — ED NOTES
Pt given 8 oz orange juice and 1 packet of paulina crackers.  Alert and able to sit up and eat without complications

## 2023-03-12 NOTE — ED NOTES
eTRANSFER SBAR NOTE    IP UNIT CALLED NOTE IS READY: Yes Spoke to Melissa Sanchez RN    IF there are questions Call transferring nurse (your name) Cleo PULLIAMRN at phone # [phone]    SITUATION/BACKGROUND:    Patient is being transferred to [address], Room# 2116    Patient's Chief Complaint on arrival to ED was Hypoglycemia and is admitted for Sepsis, Acute cystitis with hematuria, NSTEMI, and Hypoglycemia. CODE STATUS: Full Code    VITAL SIGNS (MUST BE WITHIN 1 HOUR OF TRANSFER TO IP UNIT:    TEMP: 98.0  PULSE: 100  RESP: 22  BP: 99/83  PAIN SCORE: 0/10  MEWS: 3     ISOLATION/PRECAUTIONS: No   ISOLATION TYPE: N/A    Called outstanding consults: Yes Cardiology to be called tomorrow    Are there sign and held orders that need to be released? No   Are all STAT orders completed: Yes    STAT labs collected: Yes  REPEAT LACTIC ACID DUE? No  TIME DUE: N/A    Are there any titrating drips? No   If so what? N/A    The following personal items will be sent with the patient during transfer to the floor:   All valuables:   ITEM:    ITEM: Visual Aid: Glasses, At bedside  ITEM:           ASSESSMENT:    CIWA Assessment: No      NEURO:   NIH SCORE:   Total: 0 (03/12/23 1048)    KODI SCREENING:   Swallow Screening  Is the Patient Unable to Remain Alert for Testing?: No (03/12/23 1100)  Is the Patient on a Modified Diet (Thickened Liquids) Due to Pre-existing Dysphagia?: No (03/12/23 1100)  Is There Presence of Existing Enteral Tube Feeding via the Stomach or Nose?: No (03/12/23 1100)  Is There Presence of Head-of-Bed Restrictions (Less than 30 Degrees)?: No (03/12/23 1100)  Is There Presence of Tracheotomy Tube?: No (03/12/23 1100)  Is the Patient Ordered Nothing-by-Mouth Status?: No (03/12/23 1100)  3 oz Water Swallow Screen: Pass (03/12/23 1100)      NEURO ASSESSMENT:   Neuro  Neurologic State: Drowsy, Lethargic (03/12/23 1048)  Orientation Level: Oriented X4 (03/12/23 1048)  Cognition: Follows commands (03/12/23 1048)  Speech: Appropriate for age (03/12/23 1048)  Assessment L Pupil: Brisk (03/12/23 1048)  Size L Pupil (mm): 2 (03/12/23 1048)  Assessment R Pupil: Brisk (03/12/23 1048)  Size R Pupil (mm): 2 (03/12/23 1048)  LUE Motor Response: Spontaneous , Purposeful (03/12/23 1048)  LLE Motor Response: Purposeful, Spontaneous  (03/12/23 1048)  RUE Motor Response: Spontaneous , Purposeful (03/12/23 1048)  RLE Motor Response: Spontaneous , Purposeful (03/12/23 1048)    Is patient impulsive? Yes  Is patient oriented? Yes   Do they follow commands? Yes  Is the patient ambulatory? Yes Device need rollator    FALL RISK? Yes   Is the [place] 1 Assessment completed? Yes  INTERVENTIONS: Bed rest for now    INTEGUMENTARY:   IS THE PATIENT UNDRESSED? Yes  WOUNDS PRESENT? Yes On admit to ED Yes  ARE THE WOUNDS DOCUMENTED? Yes    RESPIRATORY:   Is patient on Oxygen? No    OXYGEN: Oxygen Therapy  O2 Device: None (Room air) (03/12/23 1500)    CARDIAC:   Is cardiac monitoring ordered? Yes Last Rhythm: NSR  Patient to transfer with tele box on? [facility]  Is patient using a LIFE VEST? No     LINE ACCESS:   Peripheral IV 03/12/23 Left Antecubital (Active)   Site Assessment Clean, dry, & intact 03/12/23 1106   Phlebitis Assessment 0 03/12/23 1106   Infiltration Assessment 0 03/12/23 1106   Dressing Status Clean, dry, & intact 03/12/23 1106   Hub Color/Line Status Green 03/12/23 1106       Peripheral IV 03/12/23 Right Antecubital (Active)   Site Assessment Clean, dry, & intact 03/12/23 1107   Phlebitis Assessment 0 03/12/23 1107   Infiltration Assessment 0 03/12/23 1107   Dressing Status Clean, dry, & intact 03/12/23 1107   Hub Color/Line Status Pink 03/12/23 1107        /GI:   CONTINENT BOWEL/BLADDER? No   URINARY OUTPUT: voiding and incontinent   Written Order for Mejia Cath? No   CHRONIC OR ACUTE? N/A  If CHRONIC, is it 1days old, was it changed prior to specimen collection? N/A  WAS UA WITH REFLEX SENT TO LAB?  Yes IF NO,  COLLECT AND SEND PRIOR TO TRANSPORT TO INPATIENT AREA    RESTRAINTS IN USE: No          Additional details as Needed:    Modesto Flores., RN aware that pt received one NS bolus, and one dose of abx in ED. Next abd is due tomorrow. Jos Lee., RN aware consult for cards is routine and due tomorrow. Modesto Flores ,. RN also aware of pts wounds, and bruising to thigh's. Lactic is back to baseline. Will take pt up to [facility] with [facility] with  ED RN.

## 2023-03-12 NOTE — ED NOTES
Dextrose infusion increased to 150 ml/hr per MD Andre. Will check BG again at 1340. Hospitalist Quintin notified via [address] [place] [address].

## 2023-03-12 NOTE — PROGRESS NOTES
TRANSFER - IN REPORT:    Verbal report received from 1101 Zeeshan Guillen RN(name) on Stormy Velez  being received from ED (unit) for routine progression of care      Report consisted of patients Situation, Background, Assessment and   Recommendations(SBAR). Information from the following report(s) SBAR, Kardex, ED Summary, Intake/Output, MAR, Recent Results, and Cardiac Rhythm NSR-Sinus Tach  was reviewed with the receiving nurse. Opportunity for questions and clarification was provided. Assessment completed upon patients arrival to unit and care assumed. End of Shift Note    Bedside shift change report given to Rachel Cobb RN (oncoming nurse) by Tanika Giraldo RN (offgoing nurse). Report included the following information SBAR, Kardex, ED Summary, Intake/Output, MAR, Recent Results, and Cardiac Rhythm NSR-Sinus tach    Shift worked:  [id number]     Shift summary and any significant changes:    Closely monitoring BG levels due to patients risk for hypoglycemia. Pt incontinent, requires frequent cleaning. Zinc applied to perineum/buttocks following cleaning due to redness/excoriation. PRN Ativan given per pt request.     PRN tylenol given for back pain 3/10.     Concerns for physician to address:  None     Zone phone for oncoming shift:          Activity:  Activity Level: Bed Rest  Number times ambulated in hallways past shift: 0  Number of times OOB to chair past shift: 0    Cardiac:   Cardiac Monitoring: Yes      Cardiac Rhythm: Sinus Tachy    Access:  Current line(s): PIV     Genitourinary:   Urinary status: incontinent    Respiratory:   O2 Device: None (Room air)  Chronic home O2 use?: NO  Incentive spirometer at bedside: NO       GI:  Last Bowel Movement Date: 03/11/23  Current diet:  ADULT DIET Regular  Passing flatus: YES  Tolerating current diet: YES       Pain Management:   Patient states pain is manageable on current regimen: YES    Skin:  Hari Score: 15  Interventions: float heels, increase time out of bed, PT/OT consult, and limit briefs    Patient Safety:  Fall Score:    Interventions: bed/chair alarm, assistive device (walker, cane, etc), gripper socks, pt to call before getting OOB, and stay with me (per policy)       Length of Stay:  Expected LOS: - - -  Actual LOS: 0      Elisabeth Magallanes RN

## 2023-03-12 NOTE — ED NOTES
eTRANSFER SBAR NOTE    IP UNIT CALLED NOTE IS READY: {YES/NO:74563} Spoke to ***    IF there are questions Call transferring nurse (your name) Jenny Pham at phone # [phone]:    Patient is being transferred to {[facility] Departments:60009}, Room# ***    Patient's Chief Complaint on arrival to ED was *** and is admitted for ***. CODE STATUS: Full Code    VITAL SIGNS (MUST BE WITHIN 1 HOUR OF TRANSFER TO IP UNIT:    TEMP: ***  PULSE: ***  RESP: ***  BP: ***  PAIN SCORE: ***  MEWS: ***     ISOLATION/PRECAUTIONS: {yes/NO:00526}  ISOLATION TYPE: ***    Called outstanding consults: {YES/NO:74059}     Are there sign and held orders that need to be released? {YES/NO:52167}  Are all STAT orders completed: {YES NO:60574}    STAT labs collected: {YES/NO:04410}  REPEAT LACTIC ACID DUE? {YES/NO:97322} TIME DUE: ***    Are there any titrating drips? {Yes/no:91711}  If so what? ***    The following personal items will be sent with the patient during transfer to the floor:   All valuables:   ITEM:    ITEM: Visual Aid: Glasses, At bedside  ITEM:           ASSESSMENT:    CIWA Assessment: {yes/no:47357} Last Score: ***    NEURO:   NIH SCORE:   Total: 0 (03/12/23 1048)    KODI SCREENING:   Swallow Screening  Is the Patient Unable to Remain Alert for Testing?: No (03/12/23 1100)  Is the Patient on a Modified Diet (Thickened Liquids) Due to Pre-existing Dysphagia?: No (03/12/23 1100)  Is There Presence of Existing Enteral Tube Feeding via the Stomach or Nose?: No (03/12/23 1100)  Is There Presence of Head-of-Bed Restrictions (Less than 30 Degrees)?: No (03/12/23 1100)  Is There Presence of Tracheotomy Tube?: No (03/12/23 1100)  Is the Patient Ordered Nothing-by-Mouth Status?: No (03/12/23 1100)  3 oz Water Swallow Screen: Pass (03/12/23 1100)      NEURO ASSESSMENT:   Neuro  Neurologic State: Drowsy, Lethargic (03/12/23 1048)  Orientation Level: Oriented X4 (03/12/23 1048)  Cognition: Follows commands (03/12/23 1048)  Speech: Appropriate for age (03/12/23 1048)  Assessment L Pupil: Brisk (03/12/23 1048)  Size L Pupil (mm): 2 (03/12/23 1048)  Assessment R Pupil: Brisk (03/12/23 1048)  Size R Pupil (mm): 2 (03/12/23 1048)  LUE Motor Response: Spontaneous , Purposeful (03/12/23 1048)  LLE Motor Response: Purposeful, Spontaneous  (03/12/23 1048)  RUE Motor Response: Spontaneous , Purposeful (03/12/23 1048)  RLE Motor Response: Spontaneous , Purposeful (03/12/23 1048)    Is patient impulsive? {YES/NO:39683}  Is patient oriented? {YES/NO:44706}   Do they follow commands? {YES/NO:35296}  Is the patient ambulatory? {YES/NO:54464} Device need ***    FALL RISK? {YES/NO:46165}   Is the [place] Assessment completed? {Yes/no:20619}  INTERVENTIONS: ***    INTEGUMENTARY:   IS THE PATIENT UNDRESSED? {YES/NO:33394}  WOUNDS PRESENT? {YES/NO:12775} On admit to ED {yes/no:73149}  ARE THE WOUNDS DOCUMENTED? {YES/NO:18040}    RESPIRATORY:   Is patient on Oxygen? {YES/NO:49940}   OXYGEN: Oxygen Therapy  O2 Device: None (Room air) (03/12/23 1046)    CARDIAC:   Is cardiac monitoring ordered? {YES/NO:26769} Last Rhythm: ***  Patient to transfer with tele box on? {YES/NO:60208}  Is patient using a LIFE VEST?  {Yes/no:00007}    LINE ACCESS:   Peripheral IV 03/12/23 Left Antecubital (Active)   Site Assessment Clean, dry, & intact 03/12/23 1106   Phlebitis Assessment 0 03/12/23 1106   Infiltration Assessment 0 03/12/23 1106   Dressing Status Clean, dry, & intact 03/12/23 1106   Hub Color/Line Status Green 03/12/23 1106       Peripheral IV 03/12/23 Right Antecubital (Active)   Site Assessment Clean, dry, & intact 03/12/23 1107   Phlebitis Assessment 0 03/12/23 1107   Infiltration Assessment 0 03/12/23 1107   Dressing Status Clean, dry, & intact 03/12/23 1107   Hub Color/Line Status Pink 03/12/23 1107        /GI:   CONTINENT BOWEL/BLADDER? {YES/NO:14148}  URINARY OUTPUT: {:68309}   Written Order for [facility]ia Cath? {Yes/no:67935}  CHRONIC OR ACUTE? *** If CHRONIC, is it 1days old, was it changed prior to specimen collection? {YES/NO:38729}  WAS UA WITH REFLEX SENT TO LAB? {YES/NO:22556} IF NO,  COLLECT AND SEND PRIOR TO TRANSPORT TO INPATIENT AREA    RESTRAINTS IN USE: {YES NO:04768}     IS DOCUMENTATION COMPLETE: {YES NO:94869}  Is there a current Order? {YES/NO:37869} When does it ?  ***    Additional details as Needed:

## 2023-03-12 NOTE — PROGRESS NOTES
Pharmacy Medication Reconciliation     The patient was interviewed regarding current PTA medication list, use and drug allergies;  patient present in room and obtained permission from patient to discuss drug regimen with visitor(s) present. The patient was questioned regarding use of any other inhalers, topical products, over the counter medications, herbal medications, vitamin products or ophthalmic/nasal/otic medication use. Allergy Update: Metformin, Statins-hmg-coa reductase inhibitors, Codeine, Darvon [propoxyphene], Pcn [penicillins], and Sulfa (sulfonamide antibiotics)    Recommendations/Findings: The following amendments were made to the patient's active medication list on file at [address]:   1) Additions:   +hydrochlorothiazide  2) Deletions:   -oxycontin,   3) Changes:   Insulin clarification 40 units lantus qam, 55 units NPH qhs  Bupropion is 150mg bid  Sertraline is 100mg bid  Pertinent Findings: none    Clarified PTA med list with rx query, patient interview. PTA medication list was corrected to the following:     Prior to Admission Medications   Prescriptions Last Dose Informant Taking? Dulera 200-5 mcg/actuation HFA inhaler 3/12/2023 Self Yes   Sig: Take 2 Puffs by inhalation two (2) times a day. LORazepam (ATIVAN) 1 mg tablet  Self Yes   Sig: TAKE 1 TABLET BY MOUTH EVERY EIGHT (8) HOURS AS NEEDED FOR ANXIETY   Ofev 150 mg cap 3/12/2023 Self Yes   Sig: Take 150 mg by mouth two (2) times a day. albuterol (ProAir HFA) 90 mcg/actuation inhaler  Self Yes   Sig: Take 1 Puff by inhalation every four (4) hours as needed for Wheezing or Shortness of Breath. benzonatate (TESSALON) 200 mg capsule  Self Yes   Sig: TAKE 1 CAPSULE BY MOUTH THREE TIMES DAILY AS NEEDED FOR COUGH - SWALLOW CAPSULE WHOLE (DO NOT CRUSH)   buPROPion SR (WELLBUTRIN SR) 150 mg SR tablet  Self Yes   Sig: Take 150 mg by mouth two (2) times a day.    carvediloL (COREG) 6.25 mg tablet 3/12/2023 Self Yes   Sig: TAKE 1 TABLET TWICE A DAY   clonazePAM (KlonoPIN) 1 mg tablet 3/12/2023 Self Yes   Sig: Take 1 mg by mouth daily. clotrimazole-betamethasone (LOTRISONE) topical cream 3/12/2023 Self Yes   Sig: Apply  to affected area two (2) times a day. collagenase (SANTYL) 250 unit/gram ointment  Self Yes   Sig: Apply 1 Each to affected area daily. gabapentin (NEURONTIN) 100 mg capsule 3/12/2023 Self Yes   Sig: Take 1 Capsule by mouth three (3) times daily. Max Daily Amount: 300 mg.   hydroCHLOROthiazide (HYDRODIURIL) 25 mg tablet 3/11/2023 Self Yes   Sig: Take 25 mg by mouth daily. insulin NPH (NOVOLIN N, HUMULIN N) 100 unit/mL injection  Self Yes   Si Units by SubCUTAneous route nightly. While on 40mg of prednisone   insulin glargine (LANTUS) 100 unit/mL injection  Self Yes   Si Units by SubCUTAneous route daily. In the morning   loperamide (IMODIUM) 2 mg capsule  Self Yes   Sig: Take 1 Capsule by mouth four (4) times daily as needed for Diarrhea.   montelukast (SINGULAIR) 10 mg tablet 3/11/2023 Self Yes   Sig: Take 10 mg by mouth nightly. omega 3-dha-epa-fish oil (Fish OiL) 100-160-1,000 mg cap  Self Yes   Sig: Take 1 Caplet by mouth daily. oxyCODONE IR (ROXICODONE) 5 mg immediate release tablet  Self Yes   Sig: Take 1 Tablet by mouth every four (4) hours as needed for Pain for up to 30 days. Max Daily Amount: 30 mg.   pantoprazole (PROTONIX) 40 mg tablet  Self Yes   Sig: Take 40 mg by mouth daily. potassium chloride (K-DUR, KLOR-CON M20) 20 mEq tablet 3/12/2023 Self Yes   Sig: Take 1 Tablet by mouth three (3) times daily. predniSONE (DELTASONE) 10 mg tablet 3/12/2023 Self Yes   Sig: TAKE 4 TABLETS DAILY   primidone (Mysoline) 50 mg tablet 3/12/2023 Self Yes   Sig: Take 1 Tablet by mouth two (2) times a day. rosuvastatin (CRESTOR) 20 mg tablet 3/11/2023 at 2100 Self Yes   Sig: Take 1 Tablet by mouth nightly. sertraline (ZOLOFT) 100 mg tablet  Self Yes   Sig: Take 100 mg by mouth two (2) times a day.    zinc 50 mg tab tablet 3/12/2023 Self Yes   Sig: Take 50 mg by mouth daily.       Facility-Administered Medications: None        Thank you,  ERON Bates

## 2023-03-12 NOTE — H&P
Hospitalist Admission Note    NAME: Emiliana Ontiveros   :     MRN:  [id number]     Date/Time:  3/12/2023 1:22 PM    Patient PCP: Kike Saavedra MD  _____________________________________________________________________  Given the patient's current clinical presentation, I have a high level of concern for decompensation if discharged from the emergency department. Complex decision making was performed, which includes reviewing the patient's available past medical records, laboratory results, and x-ray films. My assessment of this patient's clinical condition and my plan of care is as follows.     Assessment / Plan:  Hypoglycemia  UTI    Admit to step down  UA suggestive of UTI  Ucx from 3/10 appears contaminated  Follow up repeat blood culture 3/12  Follow up blood culture  Lactate 2.36  C/w ceftriaxone  Accuchecks Q2 hrs    Had been on D5 but still hypoglycemic  D10 @ 125 cc / hr  Hold home insulin    PT/OT    Elevated troponin  Repeat trop  Consult caridology  EKG non-ischemic    History of ILD  Not in exacerbation    DM  Holding all insulin regimen while hypoglycemic  Last A1C  7.2 on 23    Chronic pain  C/w home oxycodone PRN    History of transaminitis  LFT's appear around baseline     HLD  C/w crestor    Anxiety / depression  C/w home zoloft and buspar    HTN  C/w coreg    PMR  Takes prednisone 40 mg PO daily    Code Status: Full      DVT Prophylaxis: Lovenox  GI Prophylaxis: not indicated          Subjective:   CHIEF COMPLAINT:  Found down    HISTORY OF PRESENT ILLNESS:     Emiliana Ontiveros is a 70 y.o. female who returns in follow-up from multi medical problems include hypertension, diabetes, hyperlipidemia, IBS, allergic rhinitis, interstitial lung disease followed by pulmonary Dr. Joseph Mayers, chronic hypoxemic respiratory failure, history of avascular necrosis of her hip, DJD, CKD stage II, obesity, prior elevated liver enzymes, history of polymyalgia rheumatica, anxiety recurrent depression who presents to the ED after being found down. Most of the history is obtained from the ED physician. Patient is awake alert and oriented but not a great historian. Patient was found down by her family and brought to the ED for further evaluation was found to be hypoglycemic. Patient herself says she does remember passing out, but cannot provide further details. Patient believes that she did take her insulin last night. Upon my examination, she denies any chest pain or shortness of breath. No nausea or vomiting. No diarrhea. No abdominal pain. We were asked to admit for work up and evaluation of the above problems. Past Medical History:   Diagnosis Date    Abnormal LFTs 08/24/2017    FATTY LIVER    Arthritis     OSTEO    Avascular necrosis of hip ([address] 08/24/2017    BILAT HIPS    Breast CA ([address] 1989, 2001    BILATERAL; SURGERY, CHEMO    Chronic pain     CKD (chronic kidney disease), stage II 8/24/2017    Coagulation disorder ([address] 1984    ITP  (DR CHU BRADSHAW) - PLATELETS DROPPED TO 5K    Depression     Diabetes ([address] 2012    TYPE 2; NIDDM    DJD (degenerative joint disease), multiple sites 8/24/2017    GI bleed 2008    HEMORRHOIDS    Hyperlipemia     Hypertension with renal disease 8/24/2017    IBS (irritable bowel syndrome) 8/24/2017    Ill-defined condition 2015    PNEUMONIA X2 - HOSPITALIZED IN 2015    Interstitial lung disease ([address] 04/01/2019    Liver disease     FATTY LIVER    Myalgia 8/24/2017    On statin therapy 8/24/2017    Overactive bladder 8/24/2017    Pneumonia 04/2015    HOSPITALIZED 3 WEEKS.     Polymyalgia rheumatica ([address] 8/24/2017    Prophylactic antibiotic 8/24/2017    Reflex abnormality     acid reflex    Rosacea         Past Surgical History:   Procedure Laterality Date    HX APPENDECTOMY  1950    HX BREAST BIOPSY Bilateral     HX CATARACT REMOVAL Bilateral     HX COLONOSCOPY      HX ENDOSCOPY      HX GI      COLONOSCOPY    HX HEENT      WISDOM TEETH    HX HYSTERECTOMY  2000S    HX MASTECTOMY Right     HX MASTECTOMY Left     HX ORTHOPAEDIC  2008    LUMBAR FUSION    HX ORTHOPAEDIC  2018    RE-DO LUMBAR FUSION, AND ADDED THORACIC FUSION    HX ORTHOPAEDIC  2019    neckectomy    HX TUBAL LIGATION      HX UROLOGICAL  2000s    BLADDER SLING    OR ARTHRP ACETBLR/PROX FEM PROSTC AGRFT/ALGRFT Left 2016    ANTERIOR APPROACH, DR Rayray Parry; (POSTOP: STANDS ONE INCH TALLER ON LEFT FOOT)    OR ARTHRP ACETBLR/PROX FEM PROSTC AGRFT/ALGRFT Right 2016    ANTERIOR APPROACH (DR Rayray Parry)    OR UNLISTED PROCEDURE BREAST  1993,     RECONSTRUCTION X2       Social History     Tobacco Use    Smoking status: Never    Smokeless tobacco: Never   Substance Use Topics    Alcohol use: No        Family History   Problem Relation Age of Onset    Heart Disease Mother          5    Kidney Disease Mother     Lung Disease Mother         COPD    Diabetes Brother     Pacemaker Brother     Kidney Disease Brother     Hypertension Brother     Anesth Problems Neg Hx      Allergies   Allergen Reactions    Metformin Diarrhea    Statins-Hmg-Coa Reductase Inhibitors Myalgia     Myalgia with  multiple statins  Takes pravachol     Codeine Rash     Rash on thighs    Darvon [Propoxyphene] Other (comments)     \"I see worms\"    Pcn [Penicillins] Rash    Sulfa (Sulfonamide Antibiotics) Rash        Prior to Admission medications    Medication Sig Start Date End Date Taking? Authorizing Provider   oxyCODONE IR (ROXICODONE) 5 mg immediate release tablet Take 1 Tablet by mouth every four (4) hours as needed for Pain for up to 30 days.  Max Daily Amount: 30 mg. 2/14/23 3/16/23  Florencio Noble MD   predniSONE (DELTASONE) 10 mg tablet TAKE 4 TABLETS DAILY 23   Florencio Noble MD   LORazepam (ATIVAN) 1 mg tablet TAKE 1 TABLET BY MOUTH EVERY EIGHT (8) HOURS AS NEEDED FOR ANXIETY 2/15/23   Florencio Noble MD   insulin glargine (Lantus Solostar U-100 Insulin) 100 unit/mL (3 mL) inpn INJECT 50 UNITS DAILY 2/13/23   Radha Riggins MD   loperamide (IMODIUM) 2 mg capsule Take 1 Capsule by mouth four (4) times daily as needed for Diarrhea. 1/17/23   Radha Riggins MD   gabapentin (NEURONTIN) 100 mg capsule Take 1 Capsule by mouth three (3) times daily. Max Daily Amount: 300 mg. 1/17/23   Radha Riggins MD   rosuvastatin (CRESTOR) 20 mg tablet Take 1 Tablet by mouth nightly. 12/13/22   Radha Riggins MD   clotrimazole-betamethasone (LOTRISONE) topical cream Apply  to affected area two (2) times a day. 11/29/22   Radha Riggins MD   oxycodone HCl (OXYCONTIN PO) Take  by mouth. PRN    Provider, Historical   insulin NPH (HUMULIN N) 100 unit/mL (3 mL) inpn 55 U daily while on 30 mg of prednisone dose--Dose change 10/20/22--updated med list--did not send prescription to the pharmacy 10/20/22   Lise Hollis MD   primidone (Mysoline) 50 mg tablet Take 1 Tablet by mouth two (2) times a day. 10/17/22   Radha Riggins MD   collagenase (SANTYL) 250 unit/gram ointment Santyl 250 unit/gram topical ointment    Provider, Historical   clonazePAM (KlonoPIN) 1 mg tablet Take 1 mg by mouth daily. Provider, Historical   buPROPion XL (WELLBUTRIN XL) 150 mg tablet TAKE 1 TABLET DAILY  Patient taking differently: 150 mg two (2) times a day. 9/12/22   Radha Riggins MD   Insulin Needles, Disposable, (BD Ultra-Fine Short Pen Needle) 31 gauge x 5/16\" ndle USE UNDER THE SKIN DAILY WITH LANTUS SOLOSTAR PEN DAILY 9/12/22   Radha Riggins MD   zinc 50 mg tab tablet Take  by mouth daily. Provider, Historical   docosahexaenoic acid/epa (FISH OIL PO) Take  by mouth. Provider, Historical   sertraline (ZOLOFT) 100 mg tablet TAKE 1 TABLET DAILY  Patient taking differently: Take 100 mg by mouth two (2) times a day. 8/1/22   Radha Riggins MD   potassium chloride (K-DUR, KLOR-CON M20) 20 mEq tablet Take 1 Tablet by mouth three (3) times daily.  7/26/22   Debbie Escobar, Catherin Mohs, MD   carvediloL (COREG) 6.25 mg tablet TAKE 1 TABLET TWICE A DAY 7/11/22   MD Jessica Grossman 200-5 mcg/actuation HFA inhaler Take 2 Puffs by inhalation two (2) times a day. 5/20/22   Provider, Historical   glucose blood VI test strips (True Metrix Glucose Test Strip) strip USE ONCE DAILY AND RECORD RESULTS IN A NOTEBOOK ALSO RECORD LAST MEALTIME IN NOTEBOOK 6/20/22   Kelley Grey MD   Ofev 150 mg cap two (2) times a day. 3/16/22   Provider, Historical   benzonatate (TESSALON) 200 mg capsule TAKE 1 CAPSULE BY MOUTH THREE TIMES DAILY AS NEEDED FOR COUGH - SWALLOW CAPSULE WHOLE (DO NOT CRUSH) 4/18/22   Kelley Grey MD   albuterol (ProAir HFA) 90 mcg/actuation inhaler Take 1 Puff by inhalation every four (4) hours as needed for Wheezing or Shortness of Breath. 12/16/21   Kelley Grey MD   pantoprazole (PROTONIX) 40 mg tablet Take 40 mg by mouth daily. 11/21/19   Provider, Historical   montelukast sodium (SINGULAIR PO) Take 10 mg by mouth nightly. 10 mg tab    Provider, Historical       REVIEW OF SYSTEMS:     I am not able to complete the review of systems because:    The patient is intubated and sedated    The patient has altered mental status due to his acute medical problems    The patient has baseline aphasia from prior stroke(s)    The patient has baseline dementia and is not reliable historian    The patient is in acute medical distress and unable to provide information           Total of 12 systems reviewed as follows:       POSITIVE= underlined text  Negative = text not underlined  General:  fever, chills, sweats, generalized weakness, weight loss/gain,      loss of appetite   Eyes:    blurred vision, eye pain, loss of vision, double vision  ENT:    rhinorrhea, pharyngitis   Respiratory:   cough, sputum production, SOB, NORMAN, wheezing, pleuritic pain   Cardiology:   chest pain, palpitations, orthopnea, PND, edema, syncope   Gastrointestinal:  abdominal pain , N/V, diarrhea, dysphagia, constipation, bleeding   Genitourinary:  frequency, urgency, dysuria, hematuria, incontinence   Muskuloskeletal :  arthralgia, myalgia, back pain  Hematology:  easy bruising, nose or gum bleeding, lymphadenopathy   Dermatological: rash, ulceration, pruritis, color change / jaundice  Endocrine:   hot flashes or polydipsia   Neurological:  headache, dizziness, confusion, focal weakness, paresthesia,     Speech difficulties, memory loss, gait difficulty  Psychological: Feelings of anxiety, depression, agitation    Objective:   VITALS:    Visit Vitals  /87   Pulse 94   Temp 98.8 °F (37.1 °C)   Resp 23   Ht 5' 3\" (1.6 m)   Wt 88.5 kg (195 lb)   SpO2 100%   BMI 34.54 kg/m²       PHYSICAL EXAM:    General:    Alert, cooperative, no distress, appears stated age. HEENT: Atraumatic, anicteric sclerae, pink conjunctivae     No oral ulcers, mucosa moist, throat clear, dentition fair  Neck:  Supple, symmetrical,  thyroid: non tender  Lungs:   Clear to auscultation bilaterally. No Wheezing or Rhonchi. No rales. Chest wall:  No tenderness  No Accessory muscle use. Heart:   Regular  rhythm,  No  murmur   No edema  Abdomen:   Soft, non-tender. Not distended. Bowel sounds normal  Extremities: No cyanosis. No clubbing,      Skin turgor normal, Capillary refill normal, Radial dial pulse 2+  Skin:     Not pale. Not Jaundiced  No rashes   Psych:  Good insight. Not depressed. Not anxious or agitated. Neurologic: EOMs intact. No facial asymmetry. No aphasia or slurred speech. Symmetrical strength, Sensation grossly intact.  Alert and oriented X 4.     _______________________________________________________________________  Care Plan discussed with:    Comments   Patient     Family      RN     Care Manager                    Consultant:      _______________________________________________________________________  Expected  Disposition:   Home with Family    HH/PT/OT/RN    SNF/LTC    [facility] ________________________________________________________________________  TOTAL TIME:  50  Minutes    Critical Care Provided     Minutes non procedure based      Comments     Reviewed previous records   >50% of visit spent in counseling and coordination of care  Discussion with patient and/or family and questions answered       Given the patient's current clinical presentation, I have a high level of concern for decompensation if discharged from the ED. Complex decision making was performed which includes reviewing the patient's available past medical records, laboratory results, and Xray films. I have also directly communicated my plan and discussed this case with the involved ED physician.     ____________________________________________________________________  Chasidy Aguilera MD    Procedures: see electronic medical records for all procedures/Xrays and details which were not copied into this note but were reviewed prior to creation of Plan.     LAB DATA REVIEWED:    Recent Results (from the past 24 hour(s))   EKG, 12 LEAD, INITIAL    Collection Time: 03/12/23 10:33 AM   Result Value Ref Range    Ventricular Rate 87 BPM    Atrial Rate 87 BPM    P-R Interval 200 ms    QRS Duration 114 ms    Q-T Interval 398 ms    QTC Calculation (Bezet) 478 ms    Calculated P Axis 10 degrees    Calculated R Axis 91 degrees    Calculated T Axis -16 degrees    Diagnosis       Normal sinus rhythm  Possible Left atrial enlargement  Rightward axis  Incomplete right bundle branch block  Septal infarct , age undetermined  ST & T wave abnormality, consider anterior ischemia  When compared with ECG of 11-DEC-2021 04:52,  Incomplete right bundle branch block has replaced Right bundle branch block  Septal infarct is now present  Criteria for Inferior infarct are no longer present     CBC WITH AUTOMATED DIFF    Collection Time: 03/12/23 10:37 AM   Result Value Ref Range    WBC 9.4 3.6 - 11.0 K/uL    RBC 5.21 (H) 3.80 - 5.20 M/uL    HGB 18.2 (H) 11.5 - 16.0 g/dL    HCT 51.9 (H) 35.0 - 47.0 %    MCV 99.6 (H) 80.0 - 99.0 FL    MCH 34.9 (H) 26.0 - 34.0 PG    MCHC 35.1 30.0 - 36.5 g/dL    RDW 13.8 11.5 - 14.5 %    PLATELET 563 853 - 186 K/uL    MPV 9.9 8.9 - 12.9 FL    NRBC 0.0 0  WBC    ABSOLUTE NRBC 0.00 0.00 - 0.01 K/uL    NEUTROPHILS 81 (H) 32 - 75 %    LYMPHOCYTES 12 12 - 49 %    MONOCYTES 7 5 - 13 %    EOSINOPHILS 0 0 - 7 %    BASOPHILS 0 0 - 1 %    IMMATURE GRANULOCYTES 0 0.0 - 0.5 %    ABS. NEUTROPHILS 7.5 1.8 - 8.0 K/UL    ABS. LYMPHOCYTES 1.1 0.8 - 3.5 K/UL    ABS. MONOCYTES 0.7 0.0 - 1.0 K/UL    ABS. EOSINOPHILS 0.0 0.0 - 0.4 K/UL    ABS. BASOPHILS 0.0 0.0 - 0.1 K/UL    ABS. IMM. GRANS. 0.0 0.00 - 0.04 K/UL    DF AUTOMATED     METABOLIC PANEL, COMPREHENSIVE    Collection Time: 03/12/23 10:37 AM   Result Value Ref Range    Sodium 136 136 - 145 mmol/L    Potassium 3.6 3.5 - 5.1 mmol/L    Chloride 100 97 - 108 mmol/L    CO2 30 21 - 32 mmol/L    Anion gap 6 5 - 15 mmol/L    Glucose 143 (H) 65 - 100 mg/dL    BUN 22 (H) 6 - 20 MG/DL    Creatinine 0.87 0.55 - 1.02 MG/DL    BUN/Creatinine ratio 25 (H) 12 - 20      eGFR >60 >60 ml/min/1.73m2    Calcium 9.0 8.5 - 10.1 MG/DL    Bilirubin, total 0.8 0.2 - 1.0 MG/DL    ALT (SGPT) 95 (H) 12 - 78 U/L    AST (SGOT) 74 (H) 15 - 37 U/L    Alk. phosphatase 189 (H) 45 - 117 U/L    Protein, total 7.7 6.4 - 8.2 g/dL    Albumin 3.6 3.5 - 5.0 g/dL    Globulin 4.1 (H) 2.0 - 4.0 g/dL    A-G Ratio 0.9 (L) 1.1 - 2.2     TROPONIN-HIGH SENSITIVITY    Collection Time: 03/12/23 10:37 AM   Result Value Ref Range    Troponin-High Sensitivity 169 (HH) 0 - 51 ng/L   SAMPLES BEING HELD    Collection Time: 03/12/23 10:37 AM   Result Value Ref Range    SAMPLES BEING HELD HOLD1     COMMENT        Add-on orders for these samples will be processed based on acceptable specimen integrity and analyte stability, which may vary by analyte.    BLOOD GAS,CHEM8,LACTIC ACID POC    Collection Time: 03/12/23 10:37 AM   Result Value Ref Range pH, venous (POC) 7.30 (L) 7.32 - 7.42      pCO2, venous (POC) 65.1 (H) 41 - 51 MMHG    pO2, venous (POC) 13 (L) 25 - 40 mmHg    BICARBONATE 32 mmol/L    Base excess (POC) 2.7 mmol/L    O2 SAT 12 %    Sodium,  136 - 145 MMOL/L    Potassium, POC 5.1 3.5 - 5.5 MMOL/L    Chloride,  100 - 108 MMOL/L    CO2, POC 32 (H) 19 - 24 MMOL/L    Anion gap, POC 6 (L) 10 - 20      Glucose,  (H) 74 - 106 MG/DL    Creatinine, POC 0.7 0.6 - 1.3 MG/DL    eGFR (POC) >60 >60 ml/min/1.73m2    Calcium, ionized (POC) 1.13 1.12 - 1.32 mmol/L    Lactic Acid (POC) 2.36 (HH) 0.40 - 2.00 mmol/L    Sample source VENOUS BLOOD      Critical value read back B0ST    URINALYSIS W/ REFLEX CULTURE    Collection Time: 03/12/23 10:55 AM    Specimen: Urine   Result Value Ref Range    Color YELLOW/STRAW      Appearance TURBID (A) CLEAR      Specific gravity 1.021      pH (UA) 8.0 5.0 - 8.0      Protein 100 (A) NEG mg/dL    Glucose 250 (A) NEG mg/dL    Ketone TRACE (A) NEG mg/dL    Bilirubin Negative NEG      Blood TRACE (A) NEG      Urobilinogen 1.0 0.2 - 1.0 EU/dL    Nitrites Positive (A) NEG      Leukocyte Esterase LARGE (A) NEG      WBC >100 (H) 0 - 4 /hpf    RBC 10-20 0 - 5 /hpf    Epithelial cells FEW FEW /lpf    Bacteria 4+ (A) NEG /hpf    UA:UC IF INDICATED URINE CULTURE ORDERED (A) CNI      Triple Phosphate crystals FEW (A) NEG     GLUCOSE, POC    Collection Time: 03/12/23 11:25 AM   Result Value Ref Range    Glucose (POC) 82 65 - 117 mg/dL    Performed by Beth Steiner RN    GLUCOSE, POC    Collection Time: 03/12/23 11:36 AM   Result Value Ref Range    Glucose (POC) 93 65 - 117 mg/dL    Performed by Leo Pierce RN    GLUCOSE, POC    Collection Time: 03/12/23 12:12 PM   Result Value Ref Range    Glucose (POC) 99 65 - 117 mg/dL    Performed by Leo Pierce RN    GLUCOSE, POC    Collection Time: 03/12/23  1:11 PM   Result Value Ref Range    Glucose (POC) 57 (L) 65 - 117 mg/dL    Performed by Thanh CONRAD    GLUCOSE, POC Collection Time: 03/12/23  1:15 PM   Result Value Ref Range    Glucose (POC) 56 (L) 65 - 117 mg/dL    Performed by Bhavik Norwood EDT

## 2023-03-13 LAB
ANION GAP SERPL CALC-SCNC: 6 MMOL/L (ref 5–15)
ATRIAL RATE: 87 BPM
BASOPHILS # BLD: 0 K/UL (ref 0–0.1)
BASOPHILS NFR BLD: 0 % (ref 0–1)
BUN SERPL-MCNC: 15 MG/DL (ref 6–20)
BUN/CREAT SERPL: 19 (ref 12–20)
CALCIUM SERPL-MCNC: 8 MG/DL (ref 8.5–10.1)
CALCULATED P AXIS, ECG09: 10 DEGREES
CALCULATED R AXIS, ECG10: 91 DEGREES
CALCULATED T AXIS, ECG11: -16 DEGREES
CHLORIDE SERPL-SCNC: 102 MMOL/L (ref 97–108)
CO2 SERPL-SCNC: 25 MMOL/L (ref 21–32)
CREAT SERPL-MCNC: 0.78 MG/DL (ref 0.55–1.02)
DIAGNOSIS, 93000: NORMAL
DIFFERENTIAL METHOD BLD: ABNORMAL
EOSINOPHIL # BLD: 0.1 K/UL (ref 0–0.4)
EOSINOPHIL NFR BLD: 1 % (ref 0–7)
ERYTHROCYTE [DISTWIDTH] IN BLOOD BY AUTOMATED COUNT: 13.8 % (ref 11.5–14.5)
GLUCOSE BLD STRIP.AUTO-MCNC: 114 MG/DL (ref 65–117)
GLUCOSE BLD STRIP.AUTO-MCNC: 117 MG/DL (ref 65–117)
GLUCOSE BLD STRIP.AUTO-MCNC: 133 MG/DL (ref 65–117)
GLUCOSE BLD STRIP.AUTO-MCNC: 148 MG/DL (ref 65–117)
GLUCOSE BLD STRIP.AUTO-MCNC: 162 MG/DL (ref 65–117)
GLUCOSE BLD STRIP.AUTO-MCNC: 165 MG/DL (ref 65–117)
GLUCOSE BLD STRIP.AUTO-MCNC: 321 MG/DL (ref 65–117)
GLUCOSE BLD STRIP.AUTO-MCNC: 396 MG/DL (ref 65–117)
GLUCOSE SERPL-MCNC: 108 MG/DL (ref 65–100)
HCT VFR BLD AUTO: 46.2 % (ref 35–47)
HGB BLD-MCNC: 16.2 G/DL (ref 11.5–16)
IMM GRANULOCYTES # BLD AUTO: 0.1 K/UL (ref 0–0.04)
IMM GRANULOCYTES NFR BLD AUTO: 1 % (ref 0–0.5)
LYMPHOCYTES # BLD: 1.8 K/UL (ref 0.8–3.5)
LYMPHOCYTES NFR BLD: 23 % (ref 12–49)
MAGNESIUM SERPL-MCNC: 1.5 MG/DL (ref 1.6–2.4)
MCH RBC QN AUTO: 35.4 PG (ref 26–34)
MCHC RBC AUTO-ENTMCNC: 35.1 G/DL (ref 30–36.5)
MCV RBC AUTO: 100.9 FL (ref 80–99)
MONOCYTES # BLD: 0.6 K/UL (ref 0–1)
MONOCYTES NFR BLD: 8 % (ref 5–13)
NEUTS SEG # BLD: 5.2 K/UL (ref 1.8–8)
NEUTS SEG NFR BLD: 67 % (ref 32–75)
NRBC # BLD: 0 K/UL (ref 0–0.01)
NRBC BLD-RTO: 0 PER 100 WBC
P-R INTERVAL, ECG05: 200 MS
PHOSPHATE SERPL-MCNC: 2.9 MG/DL (ref 2.6–4.7)
PLATELET # BLD AUTO: 135 K/UL (ref 150–400)
PMV BLD AUTO: 10.4 FL (ref 8.9–12.9)
POTASSIUM SERPL-SCNC: 3.3 MMOL/L (ref 3.5–5.1)
Q-T INTERVAL, ECG07: 398 MS
QRS DURATION, ECG06: 114 MS
QTC CALCULATION (BEZET), ECG08: 478 MS
RBC # BLD AUTO: 4.58 M/UL (ref 3.8–5.2)
RBC MORPH BLD: ABNORMAL
SERVICE CMNT-IMP: ABNORMAL
SERVICE CMNT-IMP: NORMAL
SERVICE CMNT-IMP: NORMAL
SODIUM SERPL-SCNC: 133 MMOL/L (ref 136–145)
VENTRICULAR RATE, ECG03: 87 BPM
WBC # BLD AUTO: 7.8 K/UL (ref 3.6–11)

## 2023-03-13 PROCEDURE — 97161 PT EVAL LOW COMPLEX 20 MIN: CPT

## 2023-03-13 PROCEDURE — 65270000046 HC RM TELEMETRY

## 2023-03-13 PROCEDURE — 74011000250 HC RX REV CODE- 250: Performed by: INTERNAL MEDICINE

## 2023-03-13 PROCEDURE — 83735 ASSAY OF MAGNESIUM: CPT

## 2023-03-13 PROCEDURE — 74011000258 HC RX REV CODE- 258: Performed by: INTERNAL MEDICINE

## 2023-03-13 PROCEDURE — 36415 COLL VENOUS BLD VENIPUNCTURE: CPT

## 2023-03-13 PROCEDURE — 74011636637 HC RX REV CODE- 636/637: Performed by: INTERNAL MEDICINE

## 2023-03-13 PROCEDURE — 80048 BASIC METABOLIC PNL TOTAL CA: CPT

## 2023-03-13 PROCEDURE — 99233 SBSQ HOSP IP/OBS HIGH 50: CPT | Performed by: INTERNAL MEDICINE

## 2023-03-13 PROCEDURE — 74011250636 HC RX REV CODE- 250/636: Performed by: INTERNAL MEDICINE

## 2023-03-13 PROCEDURE — 84100 ASSAY OF PHOSPHORUS: CPT

## 2023-03-13 PROCEDURE — 97535 SELF CARE MNGMENT TRAINING: CPT | Performed by: OCCUPATIONAL THERAPIST

## 2023-03-13 PROCEDURE — 85025 COMPLETE CBC W/AUTO DIFF WBC: CPT

## 2023-03-13 PROCEDURE — 74011250637 HC RX REV CODE- 250/637: Performed by: NURSE PRACTITIONER

## 2023-03-13 PROCEDURE — 97116 GAIT TRAINING THERAPY: CPT

## 2023-03-13 PROCEDURE — 97165 OT EVAL LOW COMPLEX 30 MIN: CPT | Performed by: OCCUPATIONAL THERAPIST

## 2023-03-13 PROCEDURE — 82962 GLUCOSE BLOOD TEST: CPT

## 2023-03-13 PROCEDURE — 74011250637 HC RX REV CODE- 250/637: Performed by: INTERNAL MEDICINE

## 2023-03-13 RX ORDER — POTASSIUM CHLORIDE 750 MG/1
40 TABLET, FILM COATED, EXTENDED RELEASE ORAL
Status: COMPLETED | OUTPATIENT
Start: 2023-03-13 | End: 2023-03-13

## 2023-03-13 RX ORDER — MICONAZOLE NITRATE 20 MG/G
CREAM TOPICAL 2 TIMES DAILY
Status: DISCONTINUED | OUTPATIENT
Start: 2023-03-13 | End: 2023-03-17 | Stop reason: HOSPADM

## 2023-03-13 RX ORDER — DEXTROSE, SODIUM CHLORIDE, AND POTASSIUM CHLORIDE 5; .45; .15 G/100ML; G/100ML; G/100ML
50 INJECTION INTRAVENOUS CONTINUOUS
Status: DISCONTINUED | OUTPATIENT
Start: 2023-03-13 | End: 2023-03-14

## 2023-03-13 RX ORDER — LANOLIN ALCOHOL/MO/W.PET/CERES
400 CREAM (GRAM) TOPICAL EVERY 6 HOURS
Status: COMPLETED | OUTPATIENT
Start: 2023-03-13 | End: 2023-03-13

## 2023-03-13 RX ADMIN — BUPROPION HYDROCHLORIDE 150 MG: 150 TABLET, EXTENDED RELEASE ORAL at 08:43

## 2023-03-13 RX ADMIN — CASTOR OIL AND BALSAM, PERU: 788; 87 OINTMENT TOPICAL at 21:04

## 2023-03-13 RX ADMIN — SODIUM CHLORIDE 1 G: 900 INJECTION INTRAVENOUS at 10:36

## 2023-03-13 RX ADMIN — OXYCODONE HYDROCHLORIDE 5 MG: 5 TABLET ORAL at 21:03

## 2023-03-13 RX ADMIN — SODIUM CHLORIDE, PRESERVATIVE FREE 10 ML: 5 INJECTION INTRAVENOUS at 05:34

## 2023-03-13 RX ADMIN — POTASSIUM CHLORIDE 40 MEQ: 750 TABLET, FILM COATED, EXTENDED RELEASE ORAL at 05:30

## 2023-03-13 RX ADMIN — ZINC SULFATE 220 MG (50 MG) CAPSULE 1 CAPSULE: CAPSULE at 08:24

## 2023-03-13 RX ADMIN — POTASSIUM CHLORIDE, DEXTROSE MONOHYDRATE AND SODIUM CHLORIDE 75 ML/HR: 150; 5; 450 INJECTION, SOLUTION INTRAVENOUS at 08:44

## 2023-03-13 RX ADMIN — SODIUM CHLORIDE, PRESERVATIVE FREE 10 ML: 5 INJECTION INTRAVENOUS at 14:28

## 2023-03-13 RX ADMIN — ENOXAPARIN SODIUM 40 MG: 100 INJECTION SUBCUTANEOUS at 08:24

## 2023-03-13 RX ADMIN — GABAPENTIN 100 MG: 100 CAPSULE ORAL at 21:04

## 2023-03-13 RX ADMIN — MONTELUKAST 10 MG: 10 TABLET, FILM COATED ORAL at 21:04

## 2023-03-13 RX ADMIN — CARVEDILOL 6.25 MG: 6.25 TABLET, FILM COATED ORAL at 08:25

## 2023-03-13 RX ADMIN — PANTOPRAZOLE SODIUM 40 MG: 40 TABLET, DELAYED RELEASE ORAL at 08:25

## 2023-03-13 RX ADMIN — Medication 400 MG: at 05:33

## 2023-03-13 RX ADMIN — PRIMIDONE 50 MG: 50 TABLET ORAL at 17:02

## 2023-03-13 RX ADMIN — CASTOR OIL AND BALSAM, PERU: 788; 87 OINTMENT TOPICAL at 16:42

## 2023-03-13 RX ADMIN — SERTRALINE 100 MG: 50 TABLET, FILM COATED ORAL at 21:03

## 2023-03-13 RX ADMIN — CASTOR OIL AND BALSAM, PERU: 788; 87 OINTMENT TOPICAL at 08:31

## 2023-03-13 RX ADMIN — Medication 400 MG: at 11:23

## 2023-03-13 RX ADMIN — CARVEDILOL 6.25 MG: 6.25 TABLET, FILM COATED ORAL at 16:42

## 2023-03-13 RX ADMIN — MICONAZOLE NITRATE: 20 CREAM TOPICAL at 21:04

## 2023-03-13 RX ADMIN — PRIMIDONE 50 MG: 50 TABLET ORAL at 08:24

## 2023-03-13 RX ADMIN — MICONAZOLE NITRATE: 20 CREAM TOPICAL at 11:22

## 2023-03-13 RX ADMIN — SERTRALINE 100 MG: 50 TABLET, FILM COATED ORAL at 08:25

## 2023-03-13 RX ADMIN — OXYCODONE HYDROCHLORIDE 5 MG: 5 TABLET ORAL at 10:37

## 2023-03-13 RX ADMIN — OXYCODONE HYDROCHLORIDE 5 MG: 5 TABLET ORAL at 17:02

## 2023-03-13 RX ADMIN — ROSUVASTATIN CALCIUM 20 MG: 20 TABLET, COATED ORAL at 21:04

## 2023-03-13 RX ADMIN — SODIUM CHLORIDE, PRESERVATIVE FREE 10 ML: 5 INJECTION INTRAVENOUS at 21:04

## 2023-03-13 RX ADMIN — BUPROPION HYDROCHLORIDE 150 MG: 150 TABLET, EXTENDED RELEASE ORAL at 21:04

## 2023-03-13 RX ADMIN — PREDNISONE 40 MG: 20 TABLET ORAL at 08:25

## 2023-03-13 RX ADMIN — GABAPENTIN 100 MG: 100 CAPSULE ORAL at 08:25

## 2023-03-13 RX ADMIN — GABAPENTIN 100 MG: 100 CAPSULE ORAL at 16:42

## 2023-03-13 NOTE — PROGRESS NOTES
Nutrition Note    False MST trigger for \"unknown\" weight loss PTA. Pt's weight is stable per EMR. Supplements are already on board. No acute nutrition concerns identified at this time. RD available by consult if needed. Otherwise, will see pt per length of stay policy if they are still admitted at this time.      Wt Readings from Last 10 Encounters:   03/12/23 88.5 kg (195 lb)   01/17/23 88.9 kg (196 lb)   12/22/22 87.9 kg (193 lb 12.8 oz)   10/20/22 79.1 kg (174 lb 4.8 oz)   10/17/22 84.3 kg (185 lb 12.8 oz)   09/21/22 86.5 kg (190 lb 9.6 oz)   09/09/22 86.2 kg (190 lb)   08/12/22 88.9 kg (196 lb)   07/26/22 89 kg (196 lb 1.6 oz)   07/15/22 88.1 kg (194 lb 4.8 oz)   ]    Electronically signed by Carlos Ames RD on 3/13/2023 at 2:11 PM    Contact: [phone]

## 2023-03-13 NOTE — CONSULTS
[facility]  [facility] [address]  [phone]  WW[address]. [address]       Date of  Admission: 3/12/2023 10:21 AM     Admission type:Emergency   Primary Care Physician:Phillip Buck MD     Attending Provider: Nhung Sosa MD  Cardiology Provider:     PLEASE NOTE THAT WE CONFIRMED WITH THE REFERRING PHYSICIAN PRIOR TO SEEING THE PATIENT THAT THE PATIENT IS BEING REFERRED FOR INITIAL HOSPITAL EVALUATION AND FOR LONG-TERM ONGOING CARDIAC CARE    CC/REASON FOR CONSULT: troponin elevation      Subjective:     Ruchi Lin is a 70 y.o. female admitted for Severe sepsis ([address] [A41.9, R65.20]. 72-year-old female with a past medical history of interstitial lung disease, type 2 diabetes, hyperlipidemia and additional past medical history as reported below has been admitted for urinary tract infection. Patient was found on the floor by her family unknown duration of time. Chart reports she was hypoglycemic. This morning she reports feeling back to her baseline, denies chest pain lightheadedness or dizziness. She is currently satting 98% on room air.     Patient Active Problem List    Diagnosis Date Noted    Severe sepsis ([address] 03/12/2023    Hypoglycemia 03/12/2023    UTI (urinary tract infection) 03/12/2023    Neuropathy 07/26/2022    Chronic hypoxemic respiratory failure ([address] 05/15/2022    Chronic midline low back pain without sciatica 07/06/2021    Tremor 08/13/2020    Primary osteoarthritis of right shoulder 02/13/2020    Alcohol screening 02/12/2020    Gait instability 11/13/2019    Interstitial lung disease ([address] 04/09/2019    Cervical stenosis of spine 03/26/2019    Spinal stenosis, cervical region 03/04/2019    Spinal stenosis, lumbar 07/16/2018    Lumbar disc disease 07/11/2018    Sleep disturbance 04/09/2018    Primary osteoarthritis involving multiple joints 01/12/2018    Recurrent depression ([address] 01/05/2018    Vitamin D deficiency 10/02/2017    GI bleed 08/24/2017    Overactive bladder 08/24/2017    Polymyalgia rheumatica ([address] 08/24/2017    IBS (irritable bowel syndrome) 08/24/2017    Hypertension with renal disease 08/24/2017    CKD (chronic kidney disease), stage II 08/24/2017    Avascular necrosis of hip ([address] 08/24/2017    Abnormal LFTs 08/24/2017    Controlled type 2 diabetes mellitus with stage 2 chronic kidney disease, with long-term current use of insulin ([address] 04/20/2015    Non-seasonal allergic rhinitis 04/20/2015    Class 1 obesity due to excess calories without serious comorbidity with body mass index (BMI) of 33.0 to 33.9 in adult 04/20/2015    Rosacea 04/20/2015    Mixed hyperlipidemia 04/20/2015    Anxiety 04/20/2015      Latrice Bryant MD  Past Medical History:   Diagnosis Date    Abnormal LFTs 08/24/2017    FATTY LIVER    Arthritis     OSTEO    Avascular necrosis of hip ([address] 08/24/2017    BILAT HIPS    Breast CA ([address] 1989, 2001    BILATERAL; SURGERY, CHEMO    Chronic pain     CKD (chronic kidney disease), stage II 8/24/2017    Coagulation disorder ([address] 1984    ITP  (DR CHU BRADSHAW) - PLATELETS DROPPED TO 5K    Depression     Diabetes ([address] 2012    TYPE 2; NIDDM    DJD (degenerative joint disease), multiple sites 8/24/2017    GI bleed 2008    HEMORRHOIDS    Hyperlipemia     Hypertension with renal disease 8/24/2017    IBS (irritable bowel syndrome) 8/24/2017    Ill-defined condition 2015    PNEUMONIA X2 - HOSPITALIZED IN 2015    Interstitial lung disease ([address] 04/01/2019    Liver disease     FATTY LIVER    Myalgia 8/24/2017    On statin therapy 8/24/2017    Overactive bladder 8/24/2017    Pneumonia 04/2015    HOSPITALIZED 3 WEEKS.     Polymyalgia rheumatica ([address] 8/24/2017    Prophylactic antibiotic 8/24/2017    Reflex abnormality     acid reflex    Rosacea       Social History     Socioeconomic History    Marital status:    Tobacco Use    Smoking status: Never    Smokeless tobacco: Never   Vaping Use    Vaping Use: Never used   Substance and Sexual Activity    Alcohol use: No    Drug use: No    Sexual activity: Never   Social History Narrative    Lives in [facility] alone. Has one son in [place] and one daughter in [place]. . Used to work as an  for [employer] of education.        Social Determinants of Health     Financial Resource Strain: Low Risk     Difficulty of Paying Living Expenses: Not hard at all   Food Insecurity: No Food Insecurity    Worried About Running Out of Food in the Last Year: Never true    Ran Out of Food in the Last Year: Never true     Allergies   Allergen Reactions    Metformin Diarrhea    Statins-Hmg-Coa Reductase Inhibitors Myalgia     Myalgia with  multiple statins  Takes pravachol     Codeine Rash     Rash on thighs    Darvon [Propoxyphene] Other (comments)     \"I see worms\"    Pcn [Penicillins] Rash    Sulfa (Sulfonamide Antibiotics) Rash      Family History   Problem Relation Age of Onset    Heart Disease Mother              Kidney Disease Mother     Lung Disease Mother         COPD    Diabetes Brother     Pacemaker Brother     Kidney Disease Brother     Hypertension Brother     Anesth Problems Neg Hx       Current Facility-Administered Medications   Medication Dose Route Frequency    magnesium oxide (MAG-OX) tablet 400 mg  400 mg Oral Q6H    dextrose 5% - 0.45% NaCl with KCl 20 mEq/L infusion  75 mL/hr IntraVENous CONTINUOUS    miconazole (SECURA) 2 % extra thick cream   Topical BID    sodium chloride (NS) flush 5-10 mL  5-10 mL IntraVENous PRN    rosuvastatin (CRESTOR) tablet 20 mg  20 mg Oral QHS    primidone (MYSOLINE) tablet 50 mg  50 mg Oral BID    sertraline (ZOLOFT) tablet 100 mg  100 mg Oral BID    zinc sulfate (ZINCATE) 50 mg zinc (220 mg) capsule 1 Capsule  1 Capsule Oral DAILY    pantoprazole (PROTONIX) tablet 40 mg  40 mg Oral DAILY    oxyCODONE IR (ROXICODONE) tablet 5 mg  5 mg Oral Q4H PRN montelukast (SINGULAIR) tablet 10 mg  10 mg Oral QHS    . PHARMACY TO SUBSTITUTE PER PROTOCOL (Reordered from: Ofev 150 mg cap)    Per Protocol    LORazepam (ATIVAN) tablet 1 mg  1 mg Oral Q8H PRN    gabapentin (NEURONTIN) capsule 100 mg  100 mg Oral TID    buPROPion ER (ZYBAN,BUPROBAN) tablet 150 mg  150 mg Oral BID    carvediloL (COREG) tablet 6.25 mg  6.25 mg Oral BID WITH MEALS    sodium chloride (NS) flush 5-40 mL  5-40 mL IntraVENous Q8H    sodium chloride (NS) flush 5-40 mL  5-40 mL IntraVENous PRN    acetaminophen (TYLENOL) tablet 650 mg  650 mg Oral Q6H PRN    Or    acetaminophen (TYLENOL) suppository 650 mg  650 mg Rectal Q6H PRN    polyethylene glycol (MIRALAX) packet 17 g  17 g Oral DAILY PRN    ondansetron (ZOFRAN ODT) tablet 4 mg  4 mg Oral Q8H PRN    Or    ondansetron (ZOFRAN) injection 4 mg  4 mg IntraVENous Q6H PRN    enoxaparin (LOVENOX) injection 40 mg  40 mg SubCUTAneous DAILY    predniSONE (DELTASONE) tablet 40 mg  40 mg Oral DAILY WITH BREAKFAST    cefTRIAXone (ROCEPHIN) 1 g in 0.9% sodium chloride (MBP/ADV) 50 mL MBP  1 g IntraVENous Q24H    balsam peru-castor oiL (VENELEX) ointment   Topical TID    glucose chewable tablet 16 g  4 Tablet Oral PRN    glucagon (GLUCAGEN) injection 1 mg  1 mg IntraMUSCular PRN    dextrose 10% infusion 0-250 mL  0-250 mL IntraVENous PRN          REVIEW OF SYSTEMS  Review of Systems   Constitutional:  Positive for malaise/fatigue. Negative for chills, diaphoresis, fever and weight loss. Respiratory:  Positive for shortness of breath. Negative for cough, hemoptysis, sputum production and wheezing. Cardiovascular:  Negative for chest pain, palpitations, orthopnea, claudication, leg swelling and PND. Neurological:  Positive for weakness. Negative for dizziness, speech change, focal weakness and headaches.         Objective:      Visit Vitals  /74 (BP 1 Location: Left upper arm, BP Patient Position: At rest)   Pulse 88   Temp 98.6 °F (37 °C)   Resp 22   Ht 5' 3\" (1.6 m)   Wt 88.5 kg (195 lb)   SpO2 97%   BMI 34.54 kg/m²       Physical Exam:     Physical Exam  Vitals and nursing note reviewed. Constitutional:       Appearance: Normal appearance. She is obese. She is not ill-appearing or diaphoretic. HENT:      Head: Normocephalic and atraumatic. Cardiovascular:      Rate and Rhythm: Normal rate and regular rhythm. Pulses: Normal pulses. Heart sounds: No murmur heard. No gallop. Pulmonary:      Effort: Pulmonary effort is normal.   Chest:      Chest wall: No tenderness. Neurological:      Mental Status: She is alert. Data Review:   Recent Labs     03/13/23  0209 03/12/23  1037   WBC 7.8 9.4   HGB 16.2* 18.2*   HCT 46.2 51.9*   * 157     Recent Labs     03/13/23  0209 03/12/23  1037   * 136   K 3.3* 3.6    100   CO2 25 30   * 143*   BUN 15 22*   CREA 0.78 0.87   CA 8.0* 9.0   MG 1.5*  --    PHOS 2.9  --    ALB  --  3.6   TBILI  --  0.8   ALT  --  95*     No results for input(s): CPK, CKMB, TROIQ in the last 72 hours. No lab exists for component: CKQMB, CPKMB, BMPP  No results for input(s): TROIQ, CPK, CKMB in the last 72 hours.       Intake/Output Summary (Last 24 hours) at 3/13/2023 1039  Last data filed at 3/13/2023 0912  Gross per 24 hour   Intake 1256.67 ml   Output 600 ml   Net 656.67 ml        Cardiographics    Telemetry: NSR     ECG: NSR incomp RBBB     Echocardiogram: pending     CXRAY:No evidence of pneumonia or edema       Assessment:       Principal Problem:    Severe sepsis ([facility] (3/12/2023)    Active Problems:    Controlled type 2 diabetes mellitus with stage 2 chronic kidney disease, with long-term current use of insulin ([facility] (4/20/2015)      Hypertension with renal disease (8/24/2017)      Interstitial lung disease ([facility] (4/9/2019)      Chronic hypoxemic respiratory failure ([facility] (5/15/2022)      Hypoglycemia (3/12/2023)      UTI (urinary tract infection) (3/12/2023)         Plan:     Troponin elevation: 168 in the setting of urosepsis and found down of floor for unknown period of time. Recommend to trend troponin x 3 and obtain ECHO. Denies chest pain or NORMAN. Has her baseline dyspnea secondary to interstitial lung disease; managed by Dr Petra Recio    2. HTN: Controlled, continue outpatient medications       Trend troponin, obtain ECHO.    Scott De La Vega DNP,ANP-BC    Isreal Yuan MD

## 2023-03-13 NOTE — PROGRESS NOTES
0700: Bedside and Verbal shift change report given to Vaishnavi Chicas RN (oncoming nurse) by Shanita King (offgoing nurse). Report included the following information SBAR, Kardex, Intake/Output, MAR, Recent Results, and Cardiac Rhythm NSR .     1900: End of Shift Note    Bedside shift change report given to [facility] (oncoming nurse) by Vaishnavi Chicas RN (offgoing nurse). Report included the following information SBAR, Kardex, Intake/Output, MAR, Recent Results, and Cardiac Rhythm NSR to ST    Shift worked:  [id number]     Shift summary and any significant changes:     Uneventful shift. Pt required redirection throughout shift. Pt is very restless. Roxicodone given x2     Concerns for physician to address:  none     Zone phone for oncoming shift:          Activity:  Activity Level: Bed Rest  Number times ambulated in hallways past shift: 0  Number of times OOB to chair past shift: 2    Cardiac:   Cardiac Monitoring: Yes      Cardiac Rhythm: Sinus Rhythm    Access:  Current line(s): PIV     Genitourinary:   Urinary status: incontinent and external catheter    Respiratory:   O2 Device: None (Room air)  Chronic home O2 use?: NO  Incentive spirometer at bedside: YES       GI:  Last Bowel Movement Date: 03/11/23  Current diet:  ADULT DIET Regular  ADULT ORAL NUTRITION SUPPLEMENT Breakfast, Dinner;  Low Calorie/High Protein  Passing flatus: YES  Tolerating current diet: YES       Pain Management:   Patient states pain is manageable on current regimen: YES    Skin:  Hari Score: 16  Interventions: float heels, increase time out of bed, PT/OT consult, limit briefs, and internal/external urinary devices    Patient Safety:  Fall Score:    Interventions: bed/chair alarm, assistive device (walker, cane, etc), gripper socks, pt to call before getting OOB, and stay with me (per policy)       Length of Stay:  Expected LOS: - - -  Actual LOS: [address], RN

## 2023-03-13 NOTE — PROGRESS NOTES
Problem: Mobility Impaired (Adult and Pediatric)  Goal: *Acute Goals and Plan of Care (Insert Text)  Description: FUNCTIONAL STATUS PRIOR TO ADMISSION: History provided with assist of son and paid caregiver who assists pt 9-4 daily (pt alone at night). Pt primarily uses w/c for mobility in home, able to transfer independently; uses RW with assist when going out. Able to amb into bathroom with grab bars as w/c does not fit through door. Caregiver assists pt with ADLs, med mgmt, meals. Pt has walk in shower with SC on first floor, however occasionally negotiates stairs to second floor with caregiver assist to access tub (grab bars in place for assist with in/ out of tub). Son/ caregiver note pt tendency to stay in (hospital) bed unless prompted to mobilize. No recent falls. Physical Therapy Goals  Initiated 3/13/2023  1. Patient will move from supine to sit and sit to supine  and roll side to side in bed with supervision/set-up within 7 day(s). 2.  Patient will transfer from bed to chair and chair to bed with supervision/set-up using the least restrictive device within 7 day(s). 3.  Patient will perform sit to stand with minimal assistance/contact guard assist within 7 day(s). 4.  Patient will ambulate with minimal assistance/contact guard assist for 25 feet with the least restrictive device within 7 day(s). Outcome: Not Met   PHYSICAL THERAPY EVALUATION  Patient: Risa Ambriz (14 y.o. female)  Date: 3/13/2023  Primary Diagnosis: Severe sepsis ([facility] [A41.9, R65.20]       Precautions: fall       ASSESSMENT  Based on the objective data described below, the patient presents with pain, fatigue, general weakness, impaired standing balance, gait dysfunction, decline in function with mobility following admit with sepsis/ UTI/  hypoglycemia. Pt received with son and caregiver present who assisted with providing history.  Initially declined activity due to recent return to bed from chair and c/o back pain, however agreed to mobilize to restroom. Pt completed bed mob with min A. Declined use of RW for amb to restroom; required min/ mod HHA x 2 for balance. Would benefit from use of RW next session. Pt returned to bed at end of session. Will benefit from con't PT for mobility progression as tolerated. Recommend 24/7 assist and [place] PT at d/c, otherwise SNF rehab. Current Level of Function Impacting Discharge (mobility/balance): min/ mod A x 2 short distance amb    Functional Outcome Measure: The patient scored 14/24 on the [facility] outcome measure which is indicative of Cutoff score ?171,2,3 had higher odds of discharging home with home health or need of SNF/IPR. Other factors to consider for discharge: recent need for increased assist/ supervision at home     Patient will benefit from skilled therapy intervention to address the above noted impairments. PLAN :  Recommendations and Planned Interventions: bed mobility training, transfer training, gait training, therapeutic exercises, patient and family training/education, and therapeutic activities      Frequency/Duration: Patient will be followed by physical therapy:  4 times a week to address goals.     Recommendation for discharge: (in order for the patient to meet his/her long term goals)  24/7 assist and [place] PT; otherwise SNF    This discharge recommendation:  Has not yet been discussed the attending provider and/or case management    IF patient discharges home will need the following DME: patient owns DME required for discharge         SUBJECTIVE:   Patient requested use of restroom    OBJECTIVE DATA SUMMARY:   HISTORY:    Past Medical History:   Diagnosis Date    Abnormal LFTs 08/24/2017    FATTY LIVER    Arthritis     OSTEO    Avascular necrosis of hip ([facility] 08/24/2017    BILAT HIPS    Breast CA ([facility] 1989, 2001    BILATERAL; SURGERY, CHEMO    Chronic pain     CKD (chronic kidney disease), stage II 8/24/2017    Coagulation disorder ([facility] 1984    ITP (DR Clyde Harris) - PLATELETS DROPPED TO 5K    Depression     Diabetes ([facility] 2012    TYPE 2; NIDDM    DJD (degenerative joint disease), multiple sites 8/24/2017    GI bleed 2008    HEMORRHOIDS    Hyperlipemia     Hypertension with renal disease 8/24/2017    IBS (irritable bowel syndrome) 8/24/2017    Ill-defined condition 2015    PNEUMONIA X2 - HOSPITALIZED IN 2015    Interstitial lung disease ([facility] 04/01/2019    Liver disease     FATTY LIVER    Myalgia 8/24/2017    On statin therapy 8/24/2017    Overactive bladder 8/24/2017    Pneumonia 04/2015    HOSPITALIZED 3 WEEKS.     Polymyalgia rheumatica ([facility] 8/24/2017    Prophylactic antibiotic 8/24/2017    Reflex abnormality     acid reflex    Rosacea      Past Surgical History:   Procedure Laterality Date    HX APPENDECTOMY  1950    HX BREAST BIOPSY Bilateral     HX CATARACT REMOVAL Bilateral     HX COLONOSCOPY      HX ENDOSCOPY      HX GI      COLONOSCOPY    HX HEENT      WISDOM TEETH    HX HYSTERECTOMY  2000S    HX MASTECTOMY Right 1989    HX MASTECTOMY Left 2001    HX ORTHOPAEDIC  2008    LUMBAR FUSION    HX ORTHOPAEDIC  2018    RE-DO LUMBAR FUSION, AND ADDED THORACIC FUSION    HX ORTHOPAEDIC  03/26/2019    neckectomy    HX TUBAL LIGATION  1981    HX UROLOGICAL  2000s    BLADDER SLING    CT ARTHRP ACETBLR/PROX FEM PROSTC AGRFT/ALGRFT Left 11/16/2016    ANTERIOR APPROACH, DR Gwendolyn De La Torre; (POSTOP: STANDS ONE INCH TALLER ON LEFT FOOT)    CT ARTHRP ACETBLR/PROX FEM PROSTC AGRFT/ALGRFT Right 12/2016    ANTERIOR APPROACH (DR Gwendolyn De La Torre)    CT UNLISTED PROCEDURE BREAST  1993, 2002    RECONSTRUCTION X2       Personal factors and/or comorbidities impacting plan of care:  DM, OA, chronic back pain    Home Situation  Home Environment: Private residence  Wheelchair Ramp: Yes  One/Two Story Residence: Two story, live on 1st floor  Living Alone: Yes  Support Systems: Caregiver/Home Care Staff, Child(silas) (son, paid caregiver was 5x wk now 7 days a [id number] to 4 pm, alone at night, son has been checking on pt with more fequency)  Patient Expects to be Discharged to[de-identified] Home with family assistance  Current DME Used/Available at Home: Walker, rolling, Wheelchair, Hospital bed, Grab bars, Commode, bedside, Walker, rollator, Glucometer, Shower chair (grab bars to walk into bathroom)  Tub or Shower Type: Shower (tub on second level and cargiver assist pt up the steps and sits down in the tub, bars on tub to assist from bottom of tub)    EXAMINATION/PRESENTATION/DECISION MAKING:   Critical Behavior:  Neurologic State: Alert  Orientation Level: Oriented X4  Cognition: Follows commands     Hearing: Auditory  Auditory Impairment: None    Range Of Motion:  AROM: Generally decreased, functional                       Strength:    Strength: Generally decreased, functional                    Tone & Sensation:   Tone: Normal                              Coordination:  Coordination: Generally decreased, functional  Vision:      Functional Mobility:  Bed Mobility:     Supine to Sit: Minimum assistance  Sit to Supine: Minimum assistance     Transfers:  Sit to Stand: Minimum assistance;Assist x2  Stand to Sit: Minimum assistance                       Balance:   Sitting: Intact  Standing: Impaired; With support  Standing - Static: Fair  Standing - Dynamic : Fair;Poor  Ambulation/Gait Training:  Distance (ft): 10 Feet (ft) (x 2)  Assistive Device:  (HHA x 2)  Ambulation - Level of Assistance: Minimal assistance; Moderate assistance;Assist x2        Gait Abnormalities: Decreased step clearance;Shuffling gait (stooped posture)        Base of Support: Widened     Speed/Vivian: Slow;Shuffled  Step Length: Left shortened;Right shortened             Functional Measure:  Jozef Day AM-PAC®      Basic Mobility Inpatient Short Form (6-Clicks) Version 2  How much HELP from another person do you currently need. .. (If the patient hasn't done an activity recently, how much help from another person do you think they would need if they tried?) Total A Lot A Little None   1. Turning from your back to your side while in a flat bed without using bedrails? []  1 []  2 [x]  3  []  4   2. Moving from lying on your back to sitting on the side of a flat bed without using bedrails? []  1 []  2 [x]  3  []  4   3. Moving to and from a bed to a chair (including a wheelchair)? []  1 [x]  2 []  3  []  4   4. Standing up from a chair using your arms (e.g. wheelchair or bedside chair)? []  1 [x]  2 []  3  []  4   5. Walking in hospital room? []  1 [x]  2 []  3  []  4   6. Climbing 3-5 steps with a railing? []  1 [x]  2 []  3  []  4     Raw Score: 14/24                            Cutoff score ?171,2,3 had higher odds of discharging home with home health or need of SNF/IPR. [address], Delfina Heller, Cristine Boyd. Validity of the AM-PAC 6-Clicks Inpatient Daily Activity and Basic Mobility Short Forms. Physical Therapy Mar 2014, [phone]) [phone]; DOI: 10.2522/ptj.26681263  2. Chi Long. Association of AM-PAC \"6-Clicks\" Basic Mobility and Daily Activity Scores With Discharge Destination. Phys Ther. 2021 Apr 4;101(4):brkg364. doi: 10.1093/ptj/bote158. PMID: 65819686. V Sailaja Limon, Ryan MANZANO, Orlando Mcfarlane, Sanjuana K, Alpa S. Activity Measure for Post-Acute Care \"6-Clicks\" Basic Mobility Scores Predict Discharge Destination After Acute Care Hospitalization in Select Patient Groups: A Retrospective, Observational Study. Arch Rehabil Res Clin Transl. 2022 Jul 16;4(3):380888. doi: 10.1016/j.arrct. 8810.544847. PMID: 44313328; PMCID: [id number]. 4. Hina Lubin, Nelson W, Jayne P. AM-PAC Short Forms Manual 4.0. Revised 2/2020.         Physical Therapy Evaluation Charge Determination   History Examination Presentation Decision-Making   HIGH Complexity :3+ comorbidities / personal factors will impact the outcome/ POC  MEDIUM Complexity : 3 Standardized tests and measures addressing body structure, function, activity limitation and / or participation in recreation  MEDIUM Complexity : Evolving with changing characteristics  LOW Complexity : FOTO score of       Based on the above components, the patient evaluation is determined to be of the following complexity level: LOW     Pain Rating:  Pt reports back pain, recently medicated     Activity Tolerance:   Fair    After treatment patient left in no apparent distress:   Supine in bed, Call bell within reach, Bed / chair alarm activated, Caregiver / family present, and Side rails x 3    COMMUNICATION/EDUCATION:   The patients plan of care was discussed with: Registered nurse. Fall prevention education was provided and the patient/caregiver indicated understanding.     Thank you for this referral.  Ghulam Damon, PT   Time Calculation: 20 mins

## 2023-03-13 NOTE — PROGRESS NOTES
Problem: Pressure Injury - Risk of  Goal: *Prevention of pressure injury  Description: Document Hari Scale and appropriate interventions in the flowsheet. 3/13/2023 0751 by Pastora Hutson RN  Outcome: Progressing Towards Goal  Note: Pressure Injury Interventions:  Sensory Interventions: Assess need for specialty bed, Assess changes in LOC, Minimize linen layers, Float heels, Keep linens dry and wrinkle-free, Turn and reposition approx. every two hours (pillows and wedges if needed)    Moisture Interventions: Apply protective barrier, creams and emollients, Absorbent underpads, Internal/External urinary devices, Limit adult briefs, Minimize layers    Activity Interventions: Increase time out of bed, Pressure redistribution bed/mattress(bed type), PT/OT evaluation, Assess need for specialty bed    Mobility Interventions: HOB 30 degrees or less, Pressure redistribution bed/mattress (bed type), PT/OT evaluation, Float heels, Assess need for specialty bed    Nutrition Interventions: Document food/fluid/supplement intake    Friction and Shear Interventions: Lift sheet, Minimize layers, Apply protective barrier, creams and emollients, Feet elevated on foot rest             3/13/2023 0750 by Pastora Hutson RN  Outcome: Progressing Towards Goal  Note: Pressure Injury Interventions:  Sensory Interventions: Assess need for specialty bed, Assess changes in LOC, Minimize linen layers, Float heels, Keep linens dry and wrinkle-free, Turn and reposition approx.  every two hours (pillows and wedges if needed)    Moisture Interventions: Apply protective barrier, creams and emollients, Absorbent underpads, Internal/External urinary devices, Limit adult briefs, Minimize layers    Activity Interventions: Increase time out of bed, Pressure redistribution bed/mattress(bed type), PT/OT evaluation, Assess need for specialty bed    Mobility Interventions: HOB 30 degrees or less, Pressure redistribution bed/mattress (bed type), PT/OT evaluation, Float heels, Assess need for specialty bed    Nutrition Interventions: Document food/fluid/supplement intake    Friction and Shear Interventions: Lift sheet, Minimize layers, Apply protective barrier, creams and emollients, Feet elevated on foot rest             3/13/2023 0750 by Dewayne Echevarria RN  Outcome: Progressing Towards Goal  Note: Pressure Injury Interventions:  Sensory Interventions: Assess need for specialty bed, Assess changes in LOC, Minimize linen layers, Float heels, Keep linens dry and wrinkle-free, Turn and reposition approx. every two hours (pillows and wedges if needed)    Moisture Interventions: Apply protective barrier, creams and emollients, Absorbent underpads, Internal/External urinary devices, Limit adult briefs, Minimize layers    Activity Interventions: Increase time out of bed, Pressure redistribution bed/mattress(bed type), PT/OT evaluation, Assess need for specialty bed    Mobility Interventions: HOB 30 degrees or less, Pressure redistribution bed/mattress (bed type), PT/OT evaluation, Float heels, Assess need for specialty bed    Nutrition Interventions: Document food/fluid/supplement intake    Friction and Shear Interventions: Lift sheet, Minimize layers, Apply protective barrier, creams and emollients, Feet elevated on foot rest                Problem: Patient Education: Go to Patient Education Activity  Goal: Patient/Family Education  3/13/2023 0751 by Dewayne Echevarria RN  Outcome: Progressing Towards Goal  3/13/2023 0750 by Dewayne Echevarria RN  Outcome: Progressing Towards Goal  3/13/2023 0750 by Dewayne Echevarria RN  Outcome: Progressing Towards Goal     Problem: Falls - Risk of  Goal: *Absence of Falls  Description: Document Elmer Fall Risk and appropriate interventions in the flowsheet.   3/13/2023 0751 by Dewayne Echevarria RN  Outcome: Progressing Towards Goal  Note: Fall Risk Interventions:                             3/13/2023 0750 by Dewayne Echevarria RN  Outcome: Progressing Towards Goal  Note: Fall Risk Interventions:                             3/13/2023 0750 by Lisa Leon RN  Outcome: Progressing Towards Goal  Note: Fall Risk Interventions:                                Problem: Patient Education: Go to Patient Education Activity  Goal: Patient/Family Education  3/13/2023 0751 by Lisa Leon RN  Outcome: Progressing Towards Goal  3/13/2023 0750 by Lisa Leon RN  Outcome: Progressing Towards Goal  3/13/2023 0750 by Lisa Leon RN  Outcome: Progressing Towards Goal     Problem: Urinary Tract Infection - Adult  Goal: *Absence of infection signs and symptoms  3/13/2023 0751 by Lisa Leon RN  Outcome: Progressing Towards Goal  3/13/2023 0750 by Lisa Leon RN  Outcome: Progressing Towards Goal     Problem: Patient Education: Go to Patient Education Activity  Goal: Patient/Family Education  3/13/2023 0751 by Lisa Leon RN  Outcome: Progressing Towards Goal  3/13/2023 0750 by Lisa Leon RN  Outcome: Progressing Towards Goal     Problem: Diabetes Self-Management  Goal: *Disease process and treatment process  Description: Define diabetes and identify own type of diabetes; list 3 options for treating diabetes. Outcome: Progressing Towards Goal  Goal: *Incorporating nutritional management into lifestyle  Description: Describe effect of type, amount and timing of food on blood glucose; list 3 methods for planning meals. Outcome: Progressing Towards Goal  Goal: *Incorporating physical activity into lifestyle  Description: State effect of exercise on blood glucose levels. Outcome: Progressing Towards Goal  Goal: *Developing strategies to promote health/change behavior  Description: Define the ABC's of diabetes; identify appropriate screenings, schedule and personal plan for screenings.   Outcome: Progressing Towards Goal  Goal: *Using medications safely  Description: State effect of diabetes medications on diabetes; name diabetes medication taking, action and side effects. Outcome: Progressing Towards Goal  Goal: *Monitoring blood glucose, interpreting and using results  Description: Identify recommended blood glucose targets  and personal targets. Outcome: Progressing Towards Goal  Goal: *Prevention, detection, treatment of acute complications  Description: List symptoms of hyper- and hypoglycemia; describe how to treat low blood sugar and actions for lowering  high blood glucose level. Outcome: Progressing Towards Goal  Goal: *Prevention, detection and treatment of chronic complications  Description: Define the natural course of diabetes and describe the relationship of blood glucose levels to long term complications of diabetes.   Outcome: Progressing Towards Goal  Goal: *Developing strategies to address psychosocial issues  Description: Describe feelings about living with diabetes; identify support needed and support network  Outcome: Progressing Towards Goal  Goal: *Insulin pump training  Outcome: Progressing Towards Goal  Goal: *Sick day guidelines  Outcome: Progressing Towards Goal  Goal: *Patient Specific Goal (EDIT GOAL, INSERT TEXT)  Outcome: Progressing Towards Goal     Problem: Patient Education: Go to Patient Education Activity  Goal: Patient/Family Education  Outcome: Progressing Towards Goal

## 2023-03-13 NOTE — PROGRESS NOTES
Problem: Self Care Deficits Care Plan (Adult)  Goal: *Acute Goals and Plan of Care (Insert Text)  Description: FUNCTIONAL STATUS PRIOR TO ADMISSION: very sedentary waits for assist from caregivers to mobilize, wheelchair or bed bound, limited by back pain at baseline,  wheelchair doesn't fit into bathroom that is 10 feet away from her bed, pt is able to mobilize when family/caregiver isn't there she is able to mobilize in bathroom holding onto grab bars and toilet on her own, able to self feed and groom implements provided, performed UB bathing with set up, sits on shower chair for caregiver to assist with bathing or is assisted to 2nd floor of home to bathe in bottom of tub with assist, IADLs are performed for pt, needing increasing assist at home recently, alone at night and son has been increasing assist, son voiced concern in regards to pts decline and need for increased paid caregiver care    HOME SUPPORT PRIOR TO ADMISSION: The patient lived alone with son and paid caregiver to provide assistance. Occupational Therapy Goals:  Initiated 3/13/2023  1. Patient will perform grooming with supervision/set-up within 7 days. 2. Patient will perform toileting with supervision/set-up within 7 days. 4. Patient will transfer from toilet with supervision/set-up using the least restrictive device and appropriate durable medical equipment within 7 days. Outcome: Not Met   OCCUPATIONAL THERAPY EVALUATION  Patient: Nain Brown (65 y.o. female)  Date: 3/13/2023  Primary Diagnosis: Severe sepsis ([facility] [A41.9, R65.20]       Precautions:       ASSESSMENT  Based on the objective data described below, the patient presents with flat affect and increased back pain with out of bed activity. Pts supportive son and paid caregiver were at bedside. Pt is limited with mobility and ADLS at baseline but has had needed increased assist at home recently.   Her paid caregiver has switched from 5x wk to 7 days a week and son has been checking on pt in the evenings. She is generally weak and tends to rush from place to place due to pain. Pt was able to mobilize to bathroom with hand held assist x1-2 reaching out for objects with min assist.  She quickly sat on the commode and needed moderate assist for gown management. Her son reports that she tends to wear gowns at baseline. She was able to perform teresa care seated but needed to return to supine to wash hands. Recommend either return to home with 24/7 assist and home health or SNF for rehab if assist cannot be obtained. Current Level of Function Impacting Discharge (ADLs/self-care): min assist bed mobility and mobility hand held assist 1-2 to toilet, performed toilet transfer with min assist     Feeding: Setup    Oral Facial Hygiene/Grooming: Setup    Bathing: Moderate assistance    Upper Body Dressing: Minimum assistance    Lower Body Dressing: Maximum assistance    Toileting: Moderate assistance    Functional Outcome Measure: The patient scored 55/100 on the barthel outcome measure which is indicative of moderate decline in mobility and ADLS. Other factors to consider for discharge: pt has had increasing needs for mobility and ADLS and family has hired increased assist but reports needing resources for further increased care assist     Patient will benefit from skilled therapy intervention to address the above noted impairments. PLAN :  Recommendations and Planned Interventions: self care training, functional mobility training, therapeutic exercise, balance training, therapeutic activities, patient education, home safety training, and family training/education    Frequency/Duration: Patient will be followed by occupational therapy 3 times a week to address goals.     Recommendation for discharge: (in order for the patient to meet his/her long term goals)  Occupational therapy at least 2 days/week in the home AND ensure assist and/or supervision for safety with mobility and ADLS 26/3 vs. SNF if family cannot provide 24/7 assist    This discharge recommendation:  Has not yet been discussed the attending provider and/or case management    IF patient discharges home will need the following DME: none       SUBJECTIVE:   Patient stated My back hurts all the time.     OBJECTIVE DATA SUMMARY:   HISTORY:   Past Medical History:   Diagnosis Date    Abnormal LFTs 08/24/2017    FATTY LIVER    Arthritis     OSTEO    Avascular necrosis of hip ([address] 08/24/2017    BILAT HIPS    Breast CA ([facility] 1989, 2001    BILATERAL; SURGERY, CHEMO    Chronic pain     CKD (chronic kidney disease), stage II 8/24/2017    Coagulation disorder ([facility] 1984    ITP  (DR CHU BRADSHAW) - PLATELETS DROPPED TO 5K    Depression     Diabetes ([facility] 2012    TYPE 2; NIDDM    DJD (degenerative joint disease), multiple sites 8/24/2017    GI bleed 2008    HEMORRHOIDS    Hyperlipemia     Hypertension with renal disease 8/24/2017    IBS (irritable bowel syndrome) 8/24/2017    Ill-defined condition 2015    PNEUMONIA X2 - HOSPITALIZED IN 2015    Interstitial lung disease ([facility] 04/01/2019    Liver disease     FATTY LIVER    Myalgia 8/24/2017    On statin therapy 8/24/2017    Overactive bladder 8/24/2017    Pneumonia 04/2015    HOSPITALIZED 3 WEEKS.     Polymyalgia rheumatica ([address] 8/24/2017    Prophylactic antibiotic 8/24/2017    Reflex abnormality     acid reflex    Rosacea      Past Surgical History:   Procedure Laterality Date    HX APPENDECTOMY  1950    HX BREAST BIOPSY Bilateral     HX CATARACT REMOVAL Bilateral     HX COLONOSCOPY      HX ENDOSCOPY      HX GI      COLONOSCOPY    HX HEENT      WISDOM TEETH    HX HYSTERECTOMY  2000S    HX MASTECTOMY Right 1989    HX MASTECTOMY Left 2001    HX ORTHOPAEDIC  2008    LUMBAR FUSION    HX ORTHOPAEDIC  2018    RE-DO LUMBAR FUSION, AND ADDED THORACIC FUSION    HX ORTHOPAEDIC  03/26/2019    neckectomy    HX TUBAL LIGATION  1981    HX UROLOGICAL  2000s    BLADDER SLING    [place] ARTHRP ACETBLR/PROX FEM PROSTC AGRFT/ALGRFT Left 11/16/2016    ANTERIOR APPROACH, DR Rosalinda Wilcox; (POSTOP: STANDS ONE INCH TALLER ON LEFT FOOT)    [place] ARTHRP ACETBLR/PROX FEM PROSTC AGRFT/ALGRFT Right 12/2016    ANTERIOR APPROACH (DR Rosalinda Wilcox)    [place] UNLISTED PROCEDURE BREAST  1993, 2002    RECONSTRUCTION X2       Expanded or extensive additional review of patient history:     Home Situation  Home Environment: Private residence  Wheelchair Ramp: Yes  One/Two Story Residence: Two story, live on 1st floor  Living Alone: Yes  Support Systems: Caregiver/Home Care Staff, Child(silas) (son, paid caregiver was 5x wk now 7 days a [id number] to 4 pm, alone at night, son has been checking on pt with more fequency)  Patient Expects to be Discharged to[de-identified] Home with family assistance  Current DME Used/Available at Home: Fela Dahl, Marcus Valencia, [address] bed, Grab bars, Commode, bedside, Walker, rollator, Glucometer, Shower chair (grab bars to walk into bathroom)  Tub or Shower Type: Shower (tub on second level and cargiver assist pt up the steps and sits down in the tub, bars on tub to assist from bottom of tub)    Hand dominance: Right    EXAMINATION OF PERFORMANCE DEFICITS:  Cognitive/Behavioral Status:           Perception: Cues to maintain midline in standing  Perseveration: No perseveration noted  Safety/Judgement: Fall prevention    Hearing: Auditory  Auditory Impairment: None    Vision/Perceptual:                           Acuity:  (grossly intact)         Range of Motion:    AROM: Generally decreased, functional                         Strength:    Strength: Generally decreased, functional                Coordination:  Coordination: Generally decreased, functional  Fine Motor Skills-Upper: Left Intact; Right Intact    Gross Motor Skills-Upper: Left Intact; Right Intact    Tone & Sensation:    Tone: Normal                         Balance:  Sitting: Intact  Standing: Impaired; With support  Standing - Static: Fair  Standing - Dynamic : Fair;Poor    Functional Mobility and Transfers for ADLs:  Bed Mobility:  Supine to Sit: Minimum assistance  Sit to Supine: Minimum assistance    Transfers:  Sit to Stand: Minimum assistance;Assist x2  Stand to Sit: Minimum assistance  Bed to Chair: Minimum assistance  Bathroom Mobility: Minimum assistance  Toilet Transfer : Minimum assistance    ADL Assessment:  Feeding: Setup    Oral Facial Hygiene/Grooming: Setup    Bathing: Moderate assistance    Upper Body Dressing: Minimum assistance    Lower Body Dressing: Maximum assistance    Toileting: Moderate assistance                  ADL Intervention and task modifications:  Set up washing hands at bed level (unable to perform at sink due to back pain)    Toileting  Bladder Hygiene: Set-up (seated on the commode)  Clothing Management: Moderate assistance (gown)    Cognitive Retraining  Safety/Judgement: Fall prevention        Functional Measure:    Barthel Index:  Bathin  Bladder: 10  Bowels: 10  Groomin  Dressin  Feeding: 10  Mobility: 0  Stairs: 0  Toilet Use: 0  Transfer (Bed to Chair and Back): 10  Total: 50/100      The Barthel ADL Index: Guidelines  1. The index should be used as a record of what a patient does, not as a record of what a patient could do. 2. The main aim is to establish degree of independence from any help, physical or verbal, however minor and for whatever reason. 3. The need for supervision renders the patient not independent. 4. A patient's performance should be established using the best available evidence. Asking the patient, friends/relatives and nurses are the usual sources, but direct observation and common sense are also important. However direct testing is not needed. 5. Usually the patient's performance over the preceding 24-48 hours is important, but occasionally longer periods will be relevant. 6. Middle categories imply that the patient supplies over 50 per cent of the effort.   7. Use of aids to be independent is allowed. Score Interpretation (from [facility])    Independent   60-79 Minimally independent   40-59 Partially dependent   20-39 Very dependent   <20 Totally dependent     -Tami Kohli., Barthel, D.W. (1965). Functional evaluation: the Barthel Index. [address] ([address] (1997). The Barthel activities of daily living index: self-reporting versus actual performance in the old (> or = 75 years). Journal [address] 45(7), [facility], J.J.M.F, Emilia Rodriguez, Tricia Stewart. (1999). Measuring the change in disability after inpatient rehabilitation; comparison of the responsiveness of the Barthel Index and Functional [place] Measure. Journal of Neurology, Neurosurgery, and Psychiatry, 66(4), 648-091. TEE Loya, CRICKET Castellanos, & Ellie Deras, MRodneyA. (2004) Assessment of post-stroke quality of life in cost-effectiveness studies: The usefulness of the Barthel Index and the EuroQoL-5D.  Quality of Life Research, 15, 158-45         Occupational Therapy Evaluation Charge Determination   History Examination Decision-Making   MEDIUM Complexity : Expanded review of history including physical, cognitive and psychosocial  history  MEDIUM Complexity : 3-5 performance deficits relating to physical, cognitive , or psychosocial skils that result in activity limitations and / or participation restrictions LOW Complexity : No comorbidities that affect functional and no verbal or physical assistance needed to complete eval tasks       Based on the above components, the patient evaluation is determined to be of the following complexity level: LOW   Pain Rating:  Chronic back pain, pt was medicated by nursing prior to therapist arrival    Activity Tolerance:   Poor and requires rest breaks    After treatment patient left in no apparent distress:    Supine in bed, Call bell within reach, Bed / chair alarm activated, Caregiver / family present, and Side rails x 3    COMMUNICATION/EDUCATION:   The patients plan of care was discussed with: Physical therapist, Registered nurse, and patient and her family . Home safety education was provided and the patient/caregiver indicated understanding., Patient/family have participated as able in goal setting and plan of care. , and Patient/family agree to work toward stated goals and plan of care. This patients plan of care is appropriate for delegation to BEENA.     Thank you for this referral.  Pati Douglas, OTR/L  Time Calculation: 27 mins

## 2023-03-13 NOTE — PROGRESS NOTES
HYPOGLYCEMIC EPISODE DOCUMENTATION    Patient with hypoglycemic episode(s) at 2100(time) on 03/12/23(date). BG value(s) pre-treatment 46    Was patient symptomatic? [x] yes, [] no  Patient was treated with the following rescue medications/treatments: [] D10 currently running at 125ml/hr. Gave juice, notified NP                 [] Glucose tablets                [] Glucagon                [x] 4oz juice                [] 6oz reg soda                [] 8oz low fat milk  BG value post-treatment: 67 - improved to 84 with snack.    Once BG treated and value greater than 80mg/dl, pt was provided with the following:  [x] snack  [] meal  Name of MD notified:Coty WILL   The following orders were received:

## 2023-03-13 NOTE — PROGRESS NOTES
Transition of Care Plan:    RUR: 12% (low RUR)  Disposition: Home with follow ups, daily Cgs and SUZANNE At [address] [place] [address] (SUZANNE needed). If SNF or IPR: Date FOC offered: N/A  Date FOC received: N/A  Date authorization started with reference number: N/A  Date authorization received and expires: N/A  Accepting facility: N/A  Follow up appointments: PCP/specialists if needed. DME needed: None. Patient has RW, rollator, 2 canes and a raised toilet seat. Transportation at Discharge: CG vs transport may be needed. Keys or means to access home: Patient has access. IM Medicare Letter: Needed prior to discharge. Is patient a  and connected with the [place]? No.               If yes, was [employer] transfer form completed and [facility] notified? N/A  Caregiver Contact: Nany huitron - [phone]  Discharge Caregiver contacted prior to discharge? Patient to contact. Care Conference needed?: No.               0713 - Patient pending medical stability prior to discharge, CM will continue to follow for discharge needs. 6219 - CM received call from At [address] [place] [address] who said patient is currently working with them. They evaluated patient for hospice last week but she did not meet criteria.       Eagle Camacho, ABNERN, RN    Care Management  [phone]

## 2023-03-13 NOTE — PROGRESS NOTES
Received notification from bedside RN about patient with regards to: persistent hypoglycemia despite being on continuous D10 IV infusion, needs order for hypoglycemic protocol  VS: BP 92/63, HR 80, RR 20, O2 sat 97% on RA    Intervention given:   - hypoglycemia protocol ordered    0415: Notified fo K+ 3.3 and Magnesium 1.5    - Klor con 40 meq PO x 1 dose  - Mag-Ox 400 mg PO x 2 doses  - BMP, Magnesium for tomorrow AM

## 2023-03-13 NOTE — PROGRESS NOTES
I prayed with Dexter Hill and celebrated with her the [address].   Father Kimberlyn Padron

## 2023-03-13 NOTE — PROGRESS NOTES
INTERNAL MEDICINE PROGRESS NOTE    NAME:  Lanette Smith   :   1945   MRN:   [id number]     Date/Time:  3/13/2023 6:03 AM  Subjective:   History:  Chart reviewed and patient seen and examined and D/W his nurse this AM and all events noted. She is followed by me for DM, HTN, DJD, PMR, ILD with chronic hypoxemic respiratory failure, and other medical problems. She has now been admitted with Hypoglycemia and sepsis due to a likely UTI. She was brought into the ER yesterday after being found down by her family. She was noted by EMS to be hypoglycemic with blood sugar 38. This improved to 160 after D10 given by EMS. She had recently been treated for urinary tract infection at an urgent care center so I do not know what she was taking as she does not know the name of the antibiotic either. She currently denies any urinary discomfort although some frequency is still present. She denies any back pain abdominal pain and has no nausea vomiting. She does have her chronic shortness of breath with dyspnea on exertion however she has not yet qualified for home oxygen. There are no other cardiac or respiratory complaints. There are no current GI complaints. Other than general weakness there are no neurologic complaints and there are no other complaints on complete review of systems.       Medications reviewed:  Current Facility-Administered Medications   Medication Dose Route Frequency    magnesium oxide (MAG-OX) tablet 400 mg  400 mg Oral Q6H    sodium chloride (NS) flush 5-10 mL  5-10 mL IntraVENous PRN    rosuvastatin (CRESTOR) tablet 20 mg  20 mg Oral QHS    primidone (MYSOLINE) tablet 50 mg  50 mg Oral BID    sertraline (ZOLOFT) tablet 100 mg  100 mg Oral BID    zinc sulfate (ZINCATE) 50 mg zinc (220 mg) capsule 1 Capsule  1 Capsule Oral DAILY    pantoprazole (PROTONIX) tablet 40 mg  40 mg Oral DAILY    oxyCODONE IR (ROXICODONE) tablet 5 mg  5 mg Oral Q4H PRN    montelukast (SINGULAIR) tablet 10 mg  10 mg Oral QHS    .PHARMACY TO SUBSTITUTE PER PROTOCOL (Reordered from: Ofev 150 mg cap)    Per Protocol    LORazepam (ATIVAN) tablet 1 mg  1 mg Oral Q8H PRN    gabapentin (NEURONTIN) capsule 100 mg  100 mg Oral TID    buPROPion SR (WELLBUTRIN SR) tablet 150 mg  150 mg Oral BID    carvediloL (COREG) tablet 6.25 mg  6.25 mg Oral BID WITH MEALS    sodium chloride (NS) flush 5-40 mL  5-40 mL IntraVENous Q8H    sodium chloride (NS) flush 5-40 mL  5-40 mL IntraVENous PRN    acetaminophen (TYLENOL) tablet 650 mg  650 mg Oral Q6H PRN    Or    acetaminophen (TYLENOL) suppository 650 mg  650 mg Rectal Q6H PRN    polyethylene glycol (MIRALAX) packet 17 g  17 g Oral DAILY PRN    ondansetron (ZOFRAN ODT) tablet 4 mg  4 mg Oral Q8H PRN    Or    ondansetron (ZOFRAN) injection 4 mg  4 mg IntraVENous Q6H PRN    enoxaparin (LOVENOX) injection 40 mg  40 mg SubCUTAneous DAILY    dextrose 10% infusion  125 mL/hr IntraVENous CONTINUOUS    predniSONE (DELTASONE) tablet 40 mg  40 mg Oral DAILY WITH BREAKFAST    cefTRIAXone (ROCEPHIN) 1 g in 0.9% sodium chloride (MBP/ADV) 50 mL MBP  1 g IntraVENous Q24H    balsam peru-castor oiL (VENELEX) ointment   Topical TID    glucose chewable tablet 16 g  4 Tablet Oral PRN    glucagon (GLUCAGEN) injection 1 mg  1 mg IntraMUSCular PRN    dextrose 10% infusion 0-250 mL  0-250 mL IntraVENous PRN        Objective:   Vitals:  Visit Vitals  /76 (BP 1 Location: Left upper arm, BP Patient Position: At rest)   Pulse 86   Temp 98.8 °F (37.1 °C)   Resp 24   Ht 5' 3\" (1.6 m)   Wt 195 lb (88.5 kg)   SpO2 98%   BMI 34.54 kg/m²      O2 Device: None (Room air) Temp (24hrs), Av.6 °F (37 °C), Min:98 °F (36.7 °C), Max:98.8 °F (37.1 °C)      Last 24hr Input/Output:    Intake/Output Summary (Last 24 hours) at 3/13/2023 0603  Last data filed at 3/12/2023 1343  Gross per 24 hour   Intake 1256.67 ml   Output --   Net 1256.67 ml        PHYSICAL EXAM:  General:     Alert, cooperative, no distress, appears stated age.      Head: Normocephalic, without obvious abnormality, atraumatic. Eyes:    Conjunctivae/corneas clear. PERRLA  Nose:   Nares normal. No drainage or sinus tenderness. Throat:     Lips, mucosa, and tongue normal.  No Thrush  Neck:   Supple, symmetrical,  no adenopathy, thyroid: non tender     no carotid bruit and no JVD. Back:     Symmetric,  No CVA tenderness. Lungs:    Clear to auscultation bilaterally. No Wheezing or Rhonchi. No rales. Heart:    Regular rate and rhythm,  no murmur, rub or gallop. Abdomen:    Soft, non-tender. Not distended. Bowel sounds normal. No masses  Extremities:  Extremities normal, atraumatic, No cyanosis. No edema. No clubbing  Lymph nodes:  Cervical, supraclavicular normal.  Neurologic:  Normal strength, Alert and oriented X 3.    Skin:                No rash      Lab Data Reviewed:    Recent Results (from the past 24 hour(s))   EKG, 12 LEAD, INITIAL    Collection Time: 03/12/23 10:33 AM   Result Value Ref Range    Ventricular Rate 87 BPM    Atrial Rate 87 BPM    P-R Interval 200 ms    QRS Duration 114 ms    Q-T Interval 398 ms    QTC Calculation (Bezet) 478 ms    Calculated P Axis 10 degrees    Calculated R Axis 91 degrees    Calculated T Axis -16 degrees    Diagnosis       Normal sinus rhythm  Possible Left atrial enlargement  Rightward axis  Incomplete right bundle branch block  Septal infarct , age undetermined  ST & T wave abnormality, consider anterior ischemia  When compared with ECG of 11-DEC-2021 04:52,  Incomplete right bundle branch block has replaced Right bundle branch block  Septal infarct is now present  Criteria for Inferior infarct are no longer present     CBC WITH AUTOMATED DIFF    Collection Time: 03/12/23 10:37 AM   Result Value Ref Range    WBC 9.4 3.6 - 11.0 K/uL    RBC 5.21 (H) 3.80 - 5.20 M/uL    HGB 18.2 (H) 11.5 - 16.0 g/dL    HCT 51.9 (H) 35.0 - 47.0 %    MCV 99.6 (H) 80.0 - 99.0 FL    MCH 34.9 (H) 26.0 - 34.0 PG    MCHC 35.1 30.0 - 36.5 g/dL    RDW 13.8 11.5 - 14.5 %    PLATELET 568 878 - 921 K/uL    MPV 9.9 8.9 - 12.9 FL    NRBC 0.0 0  WBC    ABSOLUTE NRBC 0.00 0.00 - 0.01 K/uL    NEUTROPHILS 81 (H) 32 - 75 %    LYMPHOCYTES 12 12 - 49 %    MONOCYTES 7 5 - 13 %    EOSINOPHILS 0 0 - 7 %    BASOPHILS 0 0 - 1 %    IMMATURE GRANULOCYTES 0 0.0 - 0.5 %    ABS. NEUTROPHILS 7.5 1.8 - 8.0 K/UL    ABS. LYMPHOCYTES 1.1 0.8 - 3.5 K/UL    ABS. MONOCYTES 0.7 0.0 - 1.0 K/UL    ABS. EOSINOPHILS 0.0 0.0 - 0.4 K/UL    ABS. BASOPHILS 0.0 0.0 - 0.1 K/UL    ABS. IMM. GRANS. 0.0 0.00 - 0.04 K/UL    DF AUTOMATED     METABOLIC PANEL, COMPREHENSIVE    Collection Time: 03/12/23 10:37 AM   Result Value Ref Range    Sodium 136 136 - 145 mmol/L    Potassium 3.6 3.5 - 5.1 mmol/L    Chloride 100 97 - 108 mmol/L    CO2 30 21 - 32 mmol/L    Anion gap 6 5 - 15 mmol/L    Glucose 143 (H) 65 - 100 mg/dL    BUN 22 (H) 6 - 20 MG/DL    Creatinine 0.87 0.55 - 1.02 MG/DL    BUN/Creatinine ratio 25 (H) 12 - 20      eGFR >60 >60 ml/min/1.73m2    Calcium 9.0 8.5 - 10.1 MG/DL    Bilirubin, total 0.8 0.2 - 1.0 MG/DL    ALT (SGPT) 95 (H) 12 - 78 U/L    AST (SGOT) 74 (H) 15 - 37 U/L    Alk. phosphatase 189 (H) 45 - 117 U/L    Protein, total 7.7 6.4 - 8.2 g/dL    Albumin 3.6 3.5 - 5.0 g/dL    Globulin 4.1 (H) 2.0 - 4.0 g/dL    A-G Ratio 0.9 (L) 1.1 - 2.2     TROPONIN-HIGH SENSITIVITY    Collection Time: 03/12/23 10:37 AM   Result Value Ref Range    Troponin-High Sensitivity 169 (HH) 0 - 51 ng/L   SAMPLES BEING HELD    Collection Time: 03/12/23 10:37 AM   Result Value Ref Range    SAMPLES BEING HELD HOLD1     COMMENT        Add-on orders for these samples will be processed based on acceptable specimen integrity and analyte stability, which may vary by analyte.    BLOOD GAS,CHEM8,LACTIC ACID POC    Collection Time: 03/12/23 10:37 AM   Result Value Ref Range    pH, venous (POC) 7.30 (L) 7.32 - 7.42      pCO2, venous (POC) 65.1 (H) 41 - 51 MMHG    pO2, venous (POC) 13 (L) 25 - 40 mmHg    BICARBONATE 32 mmol/L    Base excess (POC) 2.7 mmol/L    O2 SAT 12 %    Sodium,  136 - 145 MMOL/L    Potassium, POC 5.1 3.5 - 5.5 MMOL/L    Chloride,  100 - 108 MMOL/L    CO2, POC 32 (H) 19 - 24 MMOL/L    Anion gap, POC 6 (L) 10 - 20      Glucose,  (H) 74 - 106 MG/DL    Creatinine, POC 0.7 0.6 - 1.3 MG/DL    eGFR (POC) >60 >60 ml/min/1.73m2    Calcium, ionized (POC) 1.13 1.12 - 1.32 mmol/L    Lactic Acid (POC) 2.36 (HH) 0.40 - 2.00 mmol/L    Sample source VENOUS BLOOD      Critical value read back B0ST    URINALYSIS W/ REFLEX CULTURE    Collection Time: 03/12/23 10:55 AM    Specimen: Urine   Result Value Ref Range    Color YELLOW/STRAW      Appearance TURBID (A) CLEAR      Specific gravity 1.021      pH (UA) 8.0 5.0 - 8.0      Protein 100 (A) NEG mg/dL    Glucose 250 (A) NEG mg/dL    Ketone TRACE (A) NEG mg/dL    Bilirubin Negative NEG      Blood TRACE (A) NEG      Urobilinogen 1.0 0.2 - 1.0 EU/dL    Nitrites Positive (A) NEG      Leukocyte Esterase LARGE (A) NEG      WBC >100 (H) 0 - 4 /hpf    RBC 10-20 0 - 5 /hpf    Epithelial cells FEW FEW /lpf    Bacteria 4+ (A) NEG /hpf    UA:UC IF INDICATED URINE CULTURE ORDERED (A) CNI      Triple Phosphate crystals FEW (A) NEG     GLUCOSE, POC    Collection Time: 03/12/23 11:25 AM   Result Value Ref Range    Glucose (POC) 82 65 - 117 mg/dL    Performed by Asa Castanon RN    GLUCOSE, POC    Collection Time: 03/12/23 11:36 AM   Result Value Ref Range    Glucose (POC) 93 65 - 117 mg/dL    Performed by Tanvi Hunter RN    GLUCOSE, POC    Collection Time: 03/12/23 12:12 PM   Result Value Ref Range    Glucose (POC) 99 65 - 117 mg/dL    Performed by Tanvi Hunter RN    GLUCOSE, POC    Collection Time: 03/12/23  1:11 PM   Result Value Ref Range    Glucose (POC) 57 (L) 65 - 117 mg/dL    Performed by Cleatus Bamberger EDT    GLUCOSE, POC    Collection Time: 03/12/23  1:15 PM   Result Value Ref Range    Glucose (POC) 56 (L) 65 - 117 mg/dL    Performed by Cleatus Bamberger EDT    BLOOD GAS,CHEM8,LACTIC ACID POC Collection Time: 03/12/23  2:07 PM   Result Value Ref Range    pH, venous (POC) 7.36 7.32 - 7.42      pCO2, venous (POC) 47.9 41 - 51 MMHG    pO2, venous (POC) 32 25 - 40 mmHg    BICARBONATE 27 mmol/L    Base excess (POC) 1.0 mmol/L    O2 SAT 58 %    Sodium,  136 - 145 MMOL/L    Potassium, POC 3.4 (L) 3.5 - 5.5 MMOL/L    Chloride,  100 - 108 MMOL/L    CO2, POC 27 (H) 19 - 24 MMOL/L    Anion gap, POC 11 10 - 20      Glucose, POC 76 74 - 106 MG/DL    Creatinine, POC 0.7 0.6 - 1.3 MG/DL    eGFR (POC) >60 >60 ml/min/1.73m2    Calcium, ionized (POC) 1.11 (L) 1.12 - 1.32 mmol/L    Lactic Acid (POC) 1.47 0.40 - 2.00 mmol/L    Sample source VENOUS BLOOD     GLUCOSE, POC    Collection Time: 03/12/23  2:28 PM   Result Value Ref Range    Glucose (POC) 90 65 - 117 mg/dL    Performed by Jaky Garza RN    GLUCOSE, POC    Collection Time: 03/12/23  4:42 PM   Result Value Ref Range    Glucose (POC) 92 65 - 117 mg/dL    Performed by David REDD    GLUCOSE, POC    Collection Time: 03/12/23  8:59 PM   Result Value Ref Range    Glucose (POC) 46 (LL) 65 - 117 mg/dL    Performed by Aby HERRERA    GLUCOSE, POC    Collection Time: 03/12/23  9:00 PM   Result Value Ref Range    Glucose (POC) 51 (LL) 65 - 117 mg/dL    Performed by Aby HERRERA    GLUCOSE, POC    Collection Time: 03/12/23  9:15 PM   Result Value Ref Range    Glucose (POC) 67 65 - 117 mg/dL    Performed by Lisa Kiser RN    GLUCOSE, POC    Collection Time: 03/12/23  9:31 PM   Result Value Ref Range    Glucose (POC) 84 65 - 117 mg/dL    Performed by Lisa Kiser RN    GLUCOSE, POC    Collection Time: 03/12/23 11:45 PM   Result Value Ref Range    Glucose (POC) 137 (H) 65 - 117 mg/dL    Performed by Lisa Kiser RN    GLUCOSE, POC    Collection Time: 03/13/23  1:59 AM   Result Value Ref Range    Glucose (POC) 114 65 - 117 mg/dL    Performed by Stas Shah BASIC    Collection Time: 03/13/23  2:09 AM   Result Value Ref Range    Sodium 133 (L) 136 - 145 mmol/L    Potassium 3.3 (L) 3.5 - 5.1 mmol/L    Chloride 102 97 - 108 mmol/L    CO2 25 21 - 32 mmol/L    Anion gap 6 5 - 15 mmol/L    Glucose 108 (H) 65 - 100 mg/dL    BUN 15 6 - 20 MG/DL    Creatinine 0.78 0.55 - 1.02 MG/DL    BUN/Creatinine ratio 19 12 - 20      eGFR >60 >60 ml/min/1.73m2    Calcium 8.0 (L) 8.5 - 10.1 MG/DL   MAGNESIUM    Collection Time: 03/13/23  2:09 AM   Result Value Ref Range    Magnesium 1.5 (L) 1.6 - 2.4 mg/dL   PHOSPHORUS    Collection Time: 03/13/23  2:09 AM   Result Value Ref Range    Phosphorus 2.9 2.6 - 4.7 MG/DL   CBC WITH AUTOMATED DIFF    Collection Time: 03/13/23  2:09 AM   Result Value Ref Range    WBC 7.8 3.6 - 11.0 K/uL    RBC 4.58 3.80 - 5.20 M/uL    HGB 16.2 (H) 11.5 - 16.0 g/dL    HCT 46.2 35.0 - 47.0 %    .9 (H) 80.0 - 99.0 FL    MCH 35.4 (H) 26.0 - 34.0 PG    MCHC 35.1 30.0 - 36.5 g/dL    RDW 13.8 11.5 - 14.5 %    PLATELET 447 (L) 577 - 400 K/uL    MPV 10.4 8.9 - 12.9 FL    NRBC 0.0 0  WBC    ABSOLUTE NRBC 0.00 0.00 - 0.01 K/uL    NEUTROPHILS 67 32 - 75 %    LYMPHOCYTES 23 12 - 49 %    MONOCYTES 8 5 - 13 %    EOSINOPHILS 1 0 - 7 %    BASOPHILS 0 0 - 1 %    IMMATURE GRANULOCYTES 1 (H) 0.0 - 0.5 %    ABS. NEUTROPHILS 5.2 1.8 - 8.0 K/UL    ABS. LYMPHOCYTES 1.8 0.8 - 3.5 K/UL    ABS. MONOCYTES 0.6 0.0 - 1.0 K/UL    ABS. EOSINOPHILS 0.1 0.0 - 0.4 K/UL    ABS. BASOPHILS 0.0 0.0 - 0.1 K/UL    ABS. IMM.  GRANS. 0.1 (H) 0.00 - 0.04 K/UL    DF SMEAR SCANNED      RBC COMMENTS ANISOCYTOSIS  1+       GLUCOSE, POC    Collection Time: 03/13/23  4:09 AM   Result Value Ref Range    Glucose (POC) 117 65 - 117 mg/dL    Performed by Rosalio HERRERA          Assessment/Plan:     Principal Problem:    Severe sepsis ([facility] (3/12/2023)    Active Problems:    Controlled type 2 diabetes mellitus with stage 2 chronic kidney disease, with long-term current use of insulin ([facility] (4/20/2015)      Hypertension with renal disease (8/24/2017) Interstitial lung disease ([facility] (4/9/2019)      Chronic hypoxemic respiratory failure ([facility] (5/15/2022)      Hypoglycemia (3/12/2023)      UTI (urinary tract infection) (3/12/2023)       ___________________________________________________  PLAN:    1. Continue Rocephin for  infection/sepsis  2. Follow-up on urine culture and blood cultures. No urine culture from 31/0 had greater than 2 organisms  3. D10 drip started and blood sugar has been followed every 2 hours with all blood sugars above 100 since midnight 3/12 so change IV to D5 half-normal saline  4. Lantus obviously on hold  5. Replete potassium which is 3.3 this AM  6. Note lactate 2.36 on admission consistent with sepsis  7. PT and OT evaluation  8. Interstitial lung disease but not requiring oxygen  9. Anxiety/depression continue home Zoloft and BuSpar  10. Hyperlipidemia continue Crestor    50 Minutes spent today in direct care of this high complexity patient with greater than 50% in counseling and coordination of care.     If need to contact me use hospital  [phone], DO NOT USE PERFECT SERVE    ___________________________________________________    Attending Physician: Bryan Sánchez MD

## 2023-03-14 ENCOUNTER — APPOINTMENT (OUTPATIENT)
Dept: NON INVASIVE DIAGNOSTICS | Age: 78
End: 2023-03-14
Attending: NURSE PRACTITIONER
Payer: MEDICARE

## 2023-03-14 LAB
ANION GAP SERPL CALC-SCNC: 7 MMOL/L (ref 5–15)
BUN SERPL-MCNC: 15 MG/DL (ref 6–20)
BUN/CREAT SERPL: 18 (ref 12–20)
CALCIUM SERPL-MCNC: 8.5 MG/DL (ref 8.5–10.1)
CHLORIDE SERPL-SCNC: 109 MMOL/L (ref 97–108)
CO2 SERPL-SCNC: 23 MMOL/L (ref 21–32)
CREAT SERPL-MCNC: 0.82 MG/DL (ref 0.55–1.02)
ECHO AO ASC DIAM: 3.3 CM
ECHO AO ASCENDING AORTA INDEX: 1.75 CM/M2
ECHO AV AREA PEAK VELOCITY: 1.5 CM2
ECHO AV AREA VTI: 1.5 CM2
ECHO AV AREA/BSA PEAK VELOCITY: 0.8 CM2/M2
ECHO AV AREA/BSA VTI: 0.8 CM2/M2
ECHO AV MEAN GRADIENT: 9 MMHG
ECHO AV MEAN VELOCITY: 1.4 M/S
ECHO AV PEAK GRADIENT: 15 MMHG
ECHO AV PEAK VELOCITY: 2 M/S
ECHO AV VELOCITY RATIO: 0.4
ECHO AV VTI: 37.2 CM
ECHO LA VOL 2C: 28 ML (ref 22–52)
ECHO LA VOL 4C: 32 ML (ref 22–52)
ECHO LA VOLUME AREA LENGTH: 34 ML
ECHO LA VOLUME INDEX A2C: 15 ML/M2 (ref 16–34)
ECHO LA VOLUME INDEX A4C: 17 ML/M2 (ref 16–34)
ECHO LA VOLUME INDEX AREA LENGTH: 18 ML/M2 (ref 16–34)
ECHO LV E' LATERAL VELOCITY: 5 CM/S
ECHO LV E' SEPTAL VELOCITY: 4 CM/S
ECHO LV FRACTIONAL SHORTENING: 28 % (ref 28–44)
ECHO LV INTERNAL DIMENSION DIASTOLE INDEX: 1.9 CM/M2
ECHO LV INTERNAL DIMENSION DIASTOLIC: 3.6 CM (ref 3.9–5.3)
ECHO LV INTERNAL DIMENSION SYSTOLIC INDEX: 1.38 CM/M2
ECHO LV INTERNAL DIMENSION SYSTOLIC: 2.6 CM
ECHO LV IVSD: 1.1 CM (ref 0.6–0.9)
ECHO LV MASS 2D: 115.9 G (ref 67–162)
ECHO LV MASS INDEX 2D: 61.3 G/M2 (ref 43–95)
ECHO LV POSTERIOR WALL DIASTOLIC: 1 CM (ref 0.6–0.9)
ECHO LV RELATIVE WALL THICKNESS RATIO: 0.56
ECHO LVOT AREA: 3.8 CM2
ECHO LVOT AV VTI INDEX: 0.4
ECHO LVOT DIAM: 2.2 CM
ECHO LVOT MEAN GRADIENT: 1 MMHG
ECHO LVOT PEAK GRADIENT: 2 MMHG
ECHO LVOT PEAK VELOCITY: 0.8 M/S
ECHO LVOT STROKE VOLUME INDEX: 29.8 ML/M2
ECHO LVOT SV: 56.2 ML
ECHO LVOT VTI: 14.8 CM
ECHO MV A VELOCITY: 0.99 M/S
ECHO MV E DECELERATION TIME (DT): 295.6 MS
ECHO MV E VELOCITY: 0.67 M/S
ECHO MV E/A RATIO: 0.68
ECHO MV E/E' LATERAL: 13.4
ECHO MV E/E' RATIO (AVERAGED): 15.08
ECHO MV E/E' SEPTAL: 16.75
ECHO PV MAX VELOCITY: 0.7 M/S
ECHO PV PEAK GRADIENT: 2 MMHG
ECHO RV TAPSE: 1.8 CM (ref 1.7–?)
GLUCOSE BLD STRIP.AUTO-MCNC: 166 MG/DL (ref 65–117)
GLUCOSE BLD STRIP.AUTO-MCNC: 168 MG/DL (ref 65–117)
GLUCOSE BLD STRIP.AUTO-MCNC: 215 MG/DL (ref 65–117)
GLUCOSE BLD STRIP.AUTO-MCNC: 307 MG/DL (ref 65–117)
GLUCOSE BLD STRIP.AUTO-MCNC: 308 MG/DL (ref 65–117)
GLUCOSE SERPL-MCNC: 265 MG/DL (ref 65–100)
MAGNESIUM SERPL-MCNC: 2 MG/DL (ref 1.6–2.4)
POTASSIUM SERPL-SCNC: 4.3 MMOL/L (ref 3.5–5.1)
SERVICE CMNT-IMP: ABNORMAL
SODIUM SERPL-SCNC: 139 MMOL/L (ref 136–145)

## 2023-03-14 PROCEDURE — 83735 ASSAY OF MAGNESIUM: CPT

## 2023-03-14 PROCEDURE — 82962 GLUCOSE BLOOD TEST: CPT

## 2023-03-14 PROCEDURE — 74011250636 HC RX REV CODE- 250/636: Performed by: INTERNAL MEDICINE

## 2023-03-14 PROCEDURE — 65270000046 HC RM TELEMETRY

## 2023-03-14 PROCEDURE — 97535 SELF CARE MNGMENT TRAINING: CPT | Performed by: OCCUPATIONAL THERAPIST

## 2023-03-14 PROCEDURE — 74011636637 HC RX REV CODE- 636/637: Performed by: INTERNAL MEDICINE

## 2023-03-14 PROCEDURE — 74011250637 HC RX REV CODE- 250/637: Performed by: INTERNAL MEDICINE

## 2023-03-14 PROCEDURE — 93306 TTE W/DOPPLER COMPLETE: CPT

## 2023-03-14 PROCEDURE — 97116 GAIT TRAINING THERAPY: CPT

## 2023-03-14 PROCEDURE — 74011000258 HC RX REV CODE- 258: Performed by: INTERNAL MEDICINE

## 2023-03-14 PROCEDURE — 99233 SBSQ HOSP IP/OBS HIGH 50: CPT | Performed by: INTERNAL MEDICINE

## 2023-03-14 PROCEDURE — 80048 BASIC METABOLIC PNL TOTAL CA: CPT

## 2023-03-14 PROCEDURE — 97110 THERAPEUTIC EXERCISES: CPT

## 2023-03-14 PROCEDURE — 97110 THERAPEUTIC EXERCISES: CPT | Performed by: OCCUPATIONAL THERAPIST

## 2023-03-14 PROCEDURE — 36415 COLL VENOUS BLD VENIPUNCTURE: CPT

## 2023-03-14 PROCEDURE — 74011000250 HC RX REV CODE- 250: Performed by: INTERNAL MEDICINE

## 2023-03-14 RX ADMIN — CASTOR OIL AND BALSAM, PERU: 788; 87 OINTMENT TOPICAL at 08:33

## 2023-03-14 RX ADMIN — ENOXAPARIN SODIUM 40 MG: 100 INJECTION SUBCUTANEOUS at 08:33

## 2023-03-14 RX ADMIN — PANTOPRAZOLE SODIUM 40 MG: 40 TABLET, DELAYED RELEASE ORAL at 08:33

## 2023-03-14 RX ADMIN — PRIMIDONE 50 MG: 50 TABLET ORAL at 08:33

## 2023-03-14 RX ADMIN — PREDNISONE 40 MG: 20 TABLET ORAL at 08:33

## 2023-03-14 RX ADMIN — CASTOR OIL AND BALSAM, PERU: 788; 87 OINTMENT TOPICAL at 16:13

## 2023-03-14 RX ADMIN — BUPROPION HYDROCHLORIDE 150 MG: 150 TABLET, EXTENDED RELEASE ORAL at 08:33

## 2023-03-14 RX ADMIN — CARVEDILOL 6.25 MG: 6.25 TABLET, FILM COATED ORAL at 16:12

## 2023-03-14 RX ADMIN — OXYCODONE HYDROCHLORIDE 5 MG: 5 TABLET ORAL at 21:41

## 2023-03-14 RX ADMIN — POTASSIUM CHLORIDE, DEXTROSE MONOHYDRATE AND SODIUM CHLORIDE 50 ML/HR: 150; 5; 450 INJECTION, SOLUTION INTRAVENOUS at 05:50

## 2023-03-14 RX ADMIN — SODIUM CHLORIDE, PRESERVATIVE FREE 10 ML: 5 INJECTION INTRAVENOUS at 06:11

## 2023-03-14 RX ADMIN — MONTELUKAST 10 MG: 10 TABLET, FILM COATED ORAL at 21:41

## 2023-03-14 RX ADMIN — SODIUM CHLORIDE 1 G: 900 INJECTION INTRAVENOUS at 10:59

## 2023-03-14 RX ADMIN — SERTRALINE 100 MG: 50 TABLET, FILM COATED ORAL at 08:33

## 2023-03-14 RX ADMIN — ZINC SULFATE 220 MG (50 MG) CAPSULE 1 CAPSULE: CAPSULE at 08:33

## 2023-03-14 RX ADMIN — GABAPENTIN 100 MG: 100 CAPSULE ORAL at 21:41

## 2023-03-14 RX ADMIN — CASTOR OIL AND BALSAM, PERU: 788; 87 OINTMENT TOPICAL at 21:44

## 2023-03-14 RX ADMIN — ROSUVASTATIN CALCIUM 20 MG: 20 TABLET, COATED ORAL at 21:41

## 2023-03-14 RX ADMIN — SODIUM CHLORIDE, PRESERVATIVE FREE 10 ML: 5 INJECTION INTRAVENOUS at 16:13

## 2023-03-14 RX ADMIN — BUPROPION HYDROCHLORIDE 150 MG: 150 TABLET, EXTENDED RELEASE ORAL at 21:41

## 2023-03-14 RX ADMIN — GABAPENTIN 100 MG: 100 CAPSULE ORAL at 08:33

## 2023-03-14 RX ADMIN — SERTRALINE 100 MG: 50 TABLET, FILM COATED ORAL at 21:41

## 2023-03-14 RX ADMIN — MICONAZOLE NITRATE: 20 CREAM TOPICAL at 08:33

## 2023-03-14 RX ADMIN — LORAZEPAM 1 MG: 1 TABLET ORAL at 16:12

## 2023-03-14 RX ADMIN — GABAPENTIN 100 MG: 100 CAPSULE ORAL at 16:12

## 2023-03-14 RX ADMIN — CARVEDILOL 6.25 MG: 6.25 TABLET, FILM COATED ORAL at 08:33

## 2023-03-14 RX ADMIN — PRIMIDONE 50 MG: 50 TABLET ORAL at 17:19

## 2023-03-14 NOTE — WOUND CARE
Wound care nurse consult for multiple POA wounds  71 y/o CF admitted for severe sepsis. Past Medical History:   Diagnosis Date    Abnormal LFTs 08/24/2017    FATTY LIVER    Arthritis     OSTEO    Avascular necrosis of hip ([facility] 08/24/2017    BILAT HIPS    Breast CA ([facility] 1989, 2001    BILATERAL; SURGERY, CHEMO    Chronic pain     CKD (chronic kidney disease), stage II 8/24/2017    Coagulation disorder ([facility] 1984    ITP  (DR [facility] BRADSHAW) - PLATELETS DROPPED TO 5K    Depression     Diabetes ([facility] 2012    TYPE 2; NIDDM    DJD (degenerative joint disease), multiple sites 8/24/2017    GI bleed 2008    HEMORRHOIDS    Hyperlipemia     Hypertension with renal disease 8/24/2017    IBS (irritable bowel syndrome) 8/24/2017    Ill-defined condition 2015    PNEUMONIA X2 - HOSPITALIZED IN 2015    Interstitial lung disease ([facility] 04/01/2019    Liver disease     FATTY LIVER    Myalgia 8/24/2017    On statin therapy 8/24/2017    Overactive bladder 8/24/2017    Pneumonia 04/2015    HOSPITALIZED 3 WEEKS. Polymyalgia rheumatica ([facility] 8/24/2017    Prophylactic antibiotic 8/24/2017    Reflex abnormality     acid reflex    Rosacea      Patient has BLANCHABLE erythema to bilateral elbows, bilateral heels and buttocks. Buttocks is Irritant Contact Dermatitis with yeast r/t urinary incontinence.     Recommend:  Secura antifungal zinc cream to buttocks as ordered  Float heels  Continue Venelex ointment to heels and elbows    Sarah Record, NADER, [employer]

## 2023-03-14 NOTE — PROGRESS NOTES
Problem: Pressure Injury - Risk of  Goal: *Prevention of pressure injury  Description: Document Hari Scale and appropriate interventions in the flowsheet. Outcome: Progressing Towards Goal  Note: Pressure Injury Interventions:  Sensory Interventions: Keep linens dry and wrinkle-free, Float heels, Maintain/enhance activity level, Minimize linen layers, Assess need for specialty bed, Turn and reposition approx. every two hours (pillows and wedges if needed)    Moisture Interventions: Internal/External urinary devices, Limit adult briefs, Minimize layers, Apply protective barrier, creams and emollients, Absorbent underpads, Assess need for specialty bed    Activity Interventions: Pressure redistribution bed/mattress(bed type), Increase time out of bed, Assess need for specialty bed    Mobility Interventions: Pressure redistribution bed/mattress (bed type), HOB 30 degrees or less, PT/OT evaluation, Assess need for specialty bed    Nutrition Interventions: Document food/fluid/supplement intake    Friction and Shear Interventions: Lift sheet, Lift team/patient mobility team, Minimize layers, Apply protective barrier, creams and emollients                Problem: Patient Education: Go to Patient Education Activity  Goal: Patient/Family Education  Outcome: Progressing Towards Goal     Problem: Falls - Risk of  Goal: *Absence of Falls  Description: Document [facility] Fall Risk and appropriate interventions in the flowsheet.   Outcome: Progressing Towards Goal  Note: Fall Risk Interventions:                                Problem: Patient Education: Go to Patient Education Activity  Goal: Patient/Family Education  Outcome: Progressing Towards Goal     Problem: Urinary Tract Infection - Adult  Goal: *Absence of infection signs and symptoms  Outcome: Progressing Towards Goal     Problem: Patient Education: Go to Patient Education Activity  Goal: Patient/Family Education  Outcome: Progressing Towards Goal     Problem: Diabetes Self-Management  Goal: *Disease process and treatment process  Description: Define diabetes and identify own type of diabetes; list 3 options for treating diabetes. Outcome: Progressing Towards Goal  Goal: *Incorporating nutritional management into lifestyle  Description: Describe effect of type, amount and timing of food on blood glucose; list 3 methods for planning meals. Outcome: Progressing Towards Goal  Goal: *Incorporating physical activity into lifestyle  Description: State effect of exercise on blood glucose levels. Outcome: Progressing Towards Goal  Goal: *Developing strategies to promote health/change behavior  Description: Define the ABC's of diabetes; identify appropriate screenings, schedule and personal plan for screenings. Outcome: Progressing Towards Goal  Goal: *Using medications safely  Description: State effect of diabetes medications on diabetes; name diabetes medication taking, action and side effects. Outcome: Progressing Towards Goal  Goal: *Monitoring blood glucose, interpreting and using results  Description: Identify recommended blood glucose targets  and personal targets. Outcome: Progressing Towards Goal  Goal: *Prevention, detection, treatment of acute complications  Description: List symptoms of hyper- and hypoglycemia; describe how to treat low blood sugar and actions for lowering  high blood glucose level. Outcome: Progressing Towards Goal  Goal: *Prevention, detection and treatment of chronic complications  Description: Define the natural course of diabetes and describe the relationship of blood glucose levels to long term complications of diabetes.   Outcome: Progressing Towards Goal  Goal: *Developing strategies to address psychosocial issues  Description: Describe feelings about living with diabetes; identify support needed and support network  Outcome: Progressing Towards Goal  Goal: *Insulin pump training  Outcome: Progressing Towards Goal  Goal: *Sick day guidelines  Outcome: Progressing Towards Goal  Goal: *Patient Specific Goal (EDIT GOAL, INSERT TEXT)  Outcome: Progressing Towards Goal     Problem: Patient Education: Go to Patient Education Activity  Goal: Patient/Family Education  Outcome: Progressing Towards Goal     Problem: Patient Education: Go to Patient Education Activity  Goal: Patient/Family Education  Outcome: Progressing Towards Goal     Problem: Patient Education: Go to Patient Education Activity  Goal: Patient/Family Education  Outcome: Progressing Towards Goal

## 2023-03-14 NOTE — PROGRESS NOTES
0700: Bedside and Verbal shift change report given to Masoud Pastor RN (oncoming nurse) by Ryan Cavazos (offgoing nurse). Report included the following information SBAR, Kardex, ED Summary, Intake/Output, MAR, Recent Results, and Cardiac Rhythm NSR .     0745: Pt refusing to get to recliner at this time. Will try to get pt up later. 0900: Pt up in recliner. Family at bedside. 1605: Pt called at saying she feel anxious. PRN ativan will be given. 1610: Entered pts room to give ativan per patient request. Pt states \"What is going on in the hallway, it sounds like someone is playing a table game\". RN told pt the only people in the hallway are nurses and staff, no one is playing games. Pt appears very paranoid saying she does not believe RN. 1612: Gabapentin, Carvedilol, and Ativan scanned and given. Pt dumped all pills in her mouth and took medication. Pt then states \" you only gave me two pills\". RN reassured pt that she received all medications that were explained to her. Pt does not believe RN. RN showed pt empty pill packages and empty medication cup.     1630: Pts bed alarm sounding. RN entered pts room to discover pt standing next to bed. Pt reports she is trying to get to her belonging bag. RN supplied pt with bag. Pt begins to pull multiple pill bottles out of bag. Pt states \" I am going to see what gabapentin looks like because you didn't give it to me. \" RN once again told pt she received gabapentin and educated pt on the possibility that hospital medication may have a different appearance than home medications. 1640: Pt belonging bag placed back on windowsill. Pt educated on the importance of not taking home medication while in the hospital. Bed alarm in place. 1900: End of Shift Note    Bedside shift change report given to  Rodney Nieves (oncoming nurse) by Masoud Pastor RN (offgoing nurse).   Report included the following information SBAR, Kardex, Intake/Output, MAR, Recent Results, and Cardiac Rhythm NSR to ST    Shift worked:  [id number]     Shift summary and any significant changes:     See above    **Have two RN's in room when giving controlled medications**     Concerns for physician to address:  none     Zone phone for oncoming shift:          Activity:  Activity Level: Up with Assistance  Number times ambulated in hallways past shift: 0  Number of times OOB to chair past shift: 3    Cardiac:   Cardiac Monitoring: Yes      Cardiac Rhythm: Sinus Rhythm    Access:  Current line(s): PIV     Genitourinary:   Urinary status: voiding    Respiratory:   O2 Device: None (Room air)  Chronic home O2 use?: NO  Incentive spirometer at bedside: YES       GI:  Last Bowel Movement Date: 03/11/23  Current diet:  ADULT ORAL NUTRITION SUPPLEMENT Breakfast, Dinner;  Low Calorie/High Protein  ADULT DIET Regular; 3 carb choices (45 gm/meal)  Passing flatus: YES  Tolerating current diet: YES       Pain Management:   Patient states pain is manageable on current regimen: YES    Skin:  Hari Score: 16  Interventions: float heels, increase time out of bed, foam dressing, PT/OT consult, limit briefs, and internal/external urinary devices    Patient Safety:  Fall Score:    Interventions: bed/chair alarm, assistive device (walker, cane, etc), gripper socks, pt to call before getting OOB, and stay with me (per policy)       Length of Stay:  Expected LOS: 3d 12h  Actual LOS: 2      Nathan Aquino, RN

## 2023-03-14 NOTE — PROGRESS NOTES
Problem: Mobility Impaired (Adult and Pediatric)  Goal: *Acute Goals and Plan of Care (Insert Text)  Description: FUNCTIONAL STATUS PRIOR TO ADMISSION: History provided with assist of son and paid caregiver who assists pt 9-4 daily (pt alone at night). Pt primarily uses w/c for mobility in home, able to transfer independently; uses RW with assist when going out. Able to amb into bathroom with grab bars as w/c does not fit through door. Caregiver assists pt with ADLs, med mgmt, meals. Pt has walk in shower with SC on first floor, however occasionally negotiates stairs to second floor with caregiver assist to access tub (grab bars in place for assist with in/ out of tub). Son/ caregiver note pt tendency to stay in (hospital) bed unless prompted to mobilize. No recent falls. Physical Therapy Goals  Initiated 3/13/2023  1. Patient will move from supine to sit and sit to supine  and roll side to side in bed with supervision/set-up within 7 day(s). 2.  Patient will transfer from bed to chair and chair to bed with supervision/set-up using the least restrictive device within 7 day(s). 3.  Patient will perform sit to stand with minimal assistance/contact guard assist within 7 day(s). 4.  Patient will ambulate with minimal assistance/contact guard assist for 25 feet with the least restrictive device within 7 day(s). Outcome: Progressing Towards Goal    PHYSICAL THERAPY TREATMENT  Patient: Rani Ratliff (75 y.o. female)  Date: 3/14/2023  Diagnosis: Severe sepsis ([facility] [address] [facility] [A41.9, R65.20] [facility]      Precautions:    Chart, physical therapy assessment, plan of care and goals were reviewed. ASSESSMENT  Patient continues with skilled PT services and is progressing towards goals. Patient was laying in bed, reported just getting back to bed after sitting in the chair, but agreeable to walk. Still seems to have some confusion.   Informed patient that she has made great progress compared to her therapy session yesterday. Patient replied, \"I didn't do therapy yesterday, they said they would come back and never did. \" She required minimum assistance for supine-sit transfer to assist in raising her trunk, HOB also slightly elevated. Sit<>stand transfer progressed to minimum assistance x 1, verbal/tactile cues to push up to stand from bed. Ambulation improved to contact guard assist and she walked 25' using RW. She did scissor at times while walking and present with trendelenberg gait on the R. Patient (and friend in the room) admit that she stays in bed most of the day. Reviewed seated exercises and encouraged to begin performing daily. She required minimum assistance for sit-supine transfer to help raise her legs back into the bed. Instructed patient in supine leg exercises today. She was able to perform 10 reps of each exercise, multimodal cues provided to ensure proper technique. When asked about if patient could have 24/7 assist to go home instead of SNF, patient stated she doesn't get out of bed from 5 PM to 9 AM, but instead urinates in the diaper that entire time and the diaper doesn't get changed until the caregiver returns the next morning. Recommending SNF at this time due to patient being alone for large portion of evening and gait is unsteady at this time placing her at increased risk of falls. Current Level of Function Impacting Discharge (mobility/balance): minimum assistance for bed mobility and sit<>stand transfer, ambulate using RW and contact guard assist    Other factors to consider for discharge: lives alone with caregiver only 9 AM to 5 PM daily, son lives in the area and not able to provide assistance due to his job         PLAN :  Patient continues to benefit from skilled intervention to address the above impairments. Continue treatment per established plan of care. to address goals.     Recommendation for discharge: (in order for the patient to meet his/her long term goals)  Therapy up to 5 days/week in SNF setting    This discharge recommendation:  Has not yet been discussed the attending provider and/or case management    IF patient discharges home will need the following DME: to be determined (TBD)       SUBJECTIVE:   Patient stated I just got back in bed but I will walk.     OBJECTIVE DATA SUMMARY:   Critical Behavior:  Neurologic State: Alert  Orientation Level: Oriented X4  Cognition: Follows commands  Safety/Judgement: Fall prevention  Functional Mobility Training:  Bed Mobility:   Supine to Sit: Minimum assistance; Additional time  Sit to Supine: Minimum assistance    Transfers:  Sit to Stand: Minimum assistance;Assist x1;Additional time  Stand to Sit: Minimum assistance    Balance:  Sitting: Intact  Standing: Impaired  Standing - Static: Fair;Constant support  Standing - Dynamic : Fair;Constant support  Ambulation/Gait Training:  Distance (ft): 25 Feet (ft)  Assistive Device: Gait belt;Walker, rolling  Ambulation - Level of Assistance: Contact guard assistance  Gait Abnormalities: Decreased step clearance;Scissoring      Therapeutic Exercises:   10x each in supine: ankle pumps, quad sets, hip abduction    Pain Rating:  Chronic back pain    Activity Tolerance:   Fair    After treatment patient left in no apparent distress:   Supine in bed, Call bell within reach, Bed / chair alarm activated, Caregiver / family present, and Side rails x 3    COMMUNICATION/COLLABORATION:   The patients plan of care was discussed with: Registered nurse.      Trey Stewart, PT   Time Calculation: 24 mins

## 2023-03-14 NOTE — PROGRESS NOTES
Problem: Pressure Injury - Risk of  Goal: *Prevention of pressure injury  Description: Document Hari Scale and appropriate interventions in the flowsheet. Outcome: Progressing Towards Goal  Note: Pressure Injury Interventions:  Sensory Interventions: Assess changes in LOC, Assess need for specialty bed, Chair cushion, Float heels, Keep linens dry and wrinkle-free, Maintain/enhance activity level, Monitor skin under medical devices, Pad between skin to skin    Moisture Interventions: Absorbent underpads, Apply protective barrier, creams and emollients, Check for incontinence Q2 hours and as needed, Internal/External urinary devices, Limit adult briefs, Maintain skin hydration (lotion/cream), Minimize layers, Moisture barrier, Offer toileting Q_hr    Activity Interventions: Chair cushion, Increase time out of bed    Mobility Interventions: HOB 30 degrees or less, Chair cushion    Nutrition Interventions: Document food/fluid/supplement intake    Friction and Shear Interventions: Lift sheet, Apply protective barrier, creams and emollients                Problem: Patient Education: Go to Patient Education Activity  Goal: Patient/Family Education  Outcome: Progressing Towards Goal     Problem: Falls - Risk of  Goal: *Absence of Falls  Description: Document Luis F Hy Fall Risk and appropriate interventions in the flowsheet.   Outcome: Progressing Towards Goal  Note: Fall Risk Interventions:                                Problem: Patient Education: Go to Patient Education Activity  Goal: Patient/Family Education  Outcome: Progressing Towards Goal

## 2023-03-14 NOTE — PROGRESS NOTES
8281 - CM met with patient who is agreeable to SNF. SNF list provided and patient chose [facility] and [facility]. FOC signed. Patient understands SNF is short term. She confirmed she has CG at home. CM sent referrals via [employer].       Reyes Magallanes, ABNERN, RN    Care Management  [phone]

## 2023-03-14 NOTE — PROGRESS NOTES
Problem: Self Care Deficits Care Plan (Adult)  Goal: *Acute Goals and Plan of Care (Insert Text)  Description: FUNCTIONAL STATUS PRIOR TO ADMISSION: very sedentary waits for assist from caregivers to mobilize, wheelchair or bed bound, limited by back pain at baseline,  wheelchair doesn't fit into bathroom that is 10 feet away from her bed, pt is able to mobilize when family/caregiver isn't there she is able to mobilize in bathroom holding onto grab bars and toilet on her own, able to self feed and groom implements provided, performed UB bathing with set up, sits on shower chair for caregiver to assist with bathing or is assisted to 2nd floor of home to bathe in bottom of tub with assist, IADLs are performed for pt, needing increasing assist at home recently, alone at night and son has been increasing assist, son voiced concern in regards to pts decline and need for increased paid caregiver care    HOME SUPPORT PRIOR TO ADMISSION: The patient lived alone with son and paid caregiver to provide assistance. Occupational Therapy Goals:  Initiated 3/13/2023  1. Patient will perform grooming with supervision/set-up within 7 days. 2. Patient will perform toileting with supervision/set-up within 7 days. 4. Patient will transfer from toilet with supervision/set-up using the least restrictive device and appropriate durable medical equipment within 7 days. Outcome: Progressing Towards Goal     OCCUPATIONAL THERAPY TREATMENT  Patient: Maxwell Euceda (74 y.o. female)  Date: 3/14/2023  Diagnosis: [facility] [A41.9, R65.20] Severe sepsis ([facility]      Precautions:  fall  Chart, occupational therapy assessment, plan of care, and goals were reviewed. ASSESSMENT  Patient continues with skilled OT services and is progressing towards goals. Pt demonstrated improved functional mobility and ADL performance this session requiring CGA for LE ADLs and functional mobility and mod A for toileting.   Initiated BUE AROM HEP with written handout provided. [facility] with 24/7 assist for safety. If this level of assist is not available, then pt will require SNF. Current Level of Function Impacting Discharge (ADLs): CGA for LE ADLs and functional mobility and mod A for toileting    Other factors to consider for discharge: see above         PLAN :  Patient continues to benefit from skilled intervention to address the above impairments. Continue treatment per established plan of care to address goals. Recommend with staff: Up to chair for all meals and bathroom for toileting with min A for safety    Recommend next OT session: review HEP, standing balance and endurance    Recommendation for discharge: (in order for the patient to meet his/her long term goals)  Occupational therapy at least 2 days/week in the home AND ensure assist and/or supervision for safety     This discharge recommendation:  Has not yet been discussed the attending provider and/or case management    IF patient discharges home will need the following DME: TBD       SUBJECTIVE:   Patient stated I am so tired.     OBJECTIVE DATA SUMMARY:   Cognitive/Behavioral Status:           Perception: Appears intact  Perseveration: No perseveration noted  Safety/Judgement: Awareness of environment; Fall prevention;Decreased awareness of need for safety    Functional Mobility and Transfers for ADLs:  Bed Mobility:  Supine to Sit: Minimum assistance; Additional time  Sit to Supine: Minimum assistance  Scooting: Stand-by assistance    Transfers:  Sit to Stand: Contact guard assistance; Additional time  Functional Transfers  Bathroom Mobility: Contact guard assistance  Toilet Transfer : Contact guard assistance  Cues: Tactile cues provided;Verbal cues provided;Visual cues provided  Adaptive Equipment: Grab bars; Walker (comment)       Balance:  Sitting: Intact  Standing: Impaired  Standing - Static: Fair;Constant support  Standing - Dynamic : Fair;Constant support    ADL Intervention:  Patient instructed and indicated understanding energy conservation techniques to increase independence and safety during ADLs. Grooming  Grooming Assistance: Stand-by assistance  Position Performed: Standing  Washing Hands: Stand-by assistance  Cues: Verbal cues provided  Adaptive Equipment:  (RW)    Lower Body Dressing Assistance  Socks: Set-up; Supervision  Leg Crossed Method Used: Yes  Position Performed: Seated edge of bed  Cues: Don;Verbal cues provided    Toileting  Toileting Assistance: Moderate assistance  Bladder Hygiene: Set-up; Supervision  Bowel Hygiene: Moderate assistance (for cleanliness)  Clothing Management: Contact guard assistance  Cues: Tactile cues provided;Verbal cues provided;Visual cues provided  Adaptive Equipment: Walker;Grab bars    Cognitive Retraining  Safety/Judgement: Awareness of environment; Fall prevention;Decreased awareness of need for safety    Therapeutic Exercises:   Educated pt on benefits of AROM HEP to improve her strength and endurance for all functional tasks. Written handout provided and pt demonstrated good understanding of each exer. Pain:  No c/o pain    Activity Tolerance:   Fair and requires frequent rest breaks    After treatment patient left in no apparent distress:   Supine in bed, Call bell within reach, and Bed / chair alarm activated    COMMUNICATION/COLLABORATION:   The patients plan of care was discussed with: Physical therapist and Registered nurse.      Leeanna Whitaker OT  Time Calculation: 29 mins

## 2023-03-14 NOTE — PROGRESS NOTES
0700  End of Shift Note    Bedside shift change report given to  (oncoming nurse) by Resa Fothergill, RN (offgoing nurse). Report included the following information SBAR, Kardex, ED Summary, Procedure Summary, Intake/Output, MAR, Accordion, Recent Results, Med Rec Status, Cardiac Rhythm NSR, and Alarm Parameters     Shift worked:  Night     Shift summary and any significant changes:     POC glucose 396 @ 2130, NP notified, fluids decreased per order  and change regular diet to diabetic. Concerns for physician to address:  Elevated glucose levels, no SSI. IVF D5 1/2 NS @ 50 mL/hr.     Zone phone for oncoming shift:          Activity:  Activity Level: Bed Rest  Number times ambulated in hallways past shift: 0  Number of times OOB to chair past shift: 2    Cardiac:   Cardiac Monitoring: Yes      Cardiac Rhythm: Sinus Rhythm    Access:  Current line(s): PIV     Genitourinary:   Urinary status: voiding, incontinent, and external catheter    Respiratory:   O2 Device: None (Room air)  Chronic home O2 use?: NO  Incentive spirometer at bedside: NO       GI:  Last Bowel Movement Date: 03/11/23  Current diet:  ADULT ORAL NUTRITION SUPPLEMENT Breakfast, Dinner;  Low Calorie/High Protein  ADULT DIET Regular; 3 carb choices (45 gm/meal)  Passing flatus: YES  Tolerating current diet: YES       Pain Management:   Patient states pain is manageable on current regimen: YES    Skin:  Hari Score: 16  Interventions: float heels, increase time out of bed, limit briefs, internal/external urinary devices, and nutritional support     Patient Safety:  Fall Score:    Interventions: bed/chair alarm, assistive device (walker, cane, etc), gripper socks, pt to call before getting OOB, stay with me (per policy), and gait belt       Length of Stay:  Expected LOS: - - -  Actual LOS: 2      Resa Fothergill, RN Labs/EKG

## 2023-03-15 LAB
BACTERIA SPEC CULT: ABNORMAL
BACTERIA SPEC CULT: ABNORMAL
CC UR VC: ABNORMAL
GLUCOSE BLD STRIP.AUTO-MCNC: 338 MG/DL (ref 65–117)
GLUCOSE BLD STRIP.AUTO-MCNC: 353 MG/DL (ref 65–117)
SERVICE CMNT-IMP: ABNORMAL

## 2023-03-15 PROCEDURE — 74011000258 HC RX REV CODE- 258: Performed by: INTERNAL MEDICINE

## 2023-03-15 PROCEDURE — 97530 THERAPEUTIC ACTIVITIES: CPT

## 2023-03-15 PROCEDURE — 82962 GLUCOSE BLOOD TEST: CPT

## 2023-03-15 PROCEDURE — 74011250636 HC RX REV CODE- 250/636: Performed by: INTERNAL MEDICINE

## 2023-03-15 PROCEDURE — 74011000250 HC RX REV CODE- 250: Performed by: INTERNAL MEDICINE

## 2023-03-15 PROCEDURE — 97110 THERAPEUTIC EXERCISES: CPT

## 2023-03-15 PROCEDURE — 74011250637 HC RX REV CODE- 250/637: Performed by: INTERNAL MEDICINE

## 2023-03-15 PROCEDURE — 74011636637 HC RX REV CODE- 636/637: Performed by: INTERNAL MEDICINE

## 2023-03-15 PROCEDURE — 97116 GAIT TRAINING THERAPY: CPT

## 2023-03-15 PROCEDURE — 74011636637 HC RX REV CODE- 636/637: Performed by: FAMILY MEDICINE

## 2023-03-15 PROCEDURE — 99233 SBSQ HOSP IP/OBS HIGH 50: CPT | Performed by: INTERNAL MEDICINE

## 2023-03-15 PROCEDURE — 65270000046 HC RM TELEMETRY

## 2023-03-15 RX ORDER — INSULIN GLARGINE 100 [IU]/ML
8 INJECTION, SOLUTION SUBCUTANEOUS DAILY
Status: DISCONTINUED | OUTPATIENT
Start: 2023-03-15 | End: 2023-03-16

## 2023-03-15 RX ORDER — INSULIN LISPRO 100 [IU]/ML
5 INJECTION, SOLUTION INTRAVENOUS; SUBCUTANEOUS ONCE
Status: COMPLETED | OUTPATIENT
Start: 2023-03-15 | End: 2023-03-15

## 2023-03-15 RX ORDER — INSULIN LISPRO 100 [IU]/ML
INJECTION, SOLUTION INTRAVENOUS; SUBCUTANEOUS
Status: DISCONTINUED | OUTPATIENT
Start: 2023-03-16 | End: 2023-03-17 | Stop reason: HOSPADM

## 2023-03-15 RX ORDER — CARVEDILOL 12.5 MG/1
12.5 TABLET ORAL 2 TIMES DAILY WITH MEALS
Status: DISCONTINUED | OUTPATIENT
Start: 2023-03-15 | End: 2023-03-17 | Stop reason: HOSPADM

## 2023-03-15 RX ORDER — DEXTROSE MONOHYDRATE 100 MG/ML
0-250 INJECTION, SOLUTION INTRAVENOUS AS NEEDED
Status: DISCONTINUED | OUTPATIENT
Start: 2023-03-15 | End: 2023-03-16 | Stop reason: SDUPTHER

## 2023-03-15 RX ORDER — IBUPROFEN 200 MG
4 TABLET ORAL AS NEEDED
Status: DISCONTINUED | OUTPATIENT
Start: 2023-03-15 | End: 2023-03-16 | Stop reason: SDUPTHER

## 2023-03-15 RX ADMIN — PRIMIDONE 50 MG: 50 TABLET ORAL at 17:08

## 2023-03-15 RX ADMIN — SODIUM CHLORIDE, PRESERVATIVE FREE 10 ML: 5 INJECTION INTRAVENOUS at 04:26

## 2023-03-15 RX ADMIN — LORAZEPAM 1 MG: 1 TABLET ORAL at 19:43

## 2023-03-15 RX ADMIN — PANTOPRAZOLE SODIUM 40 MG: 40 TABLET, DELAYED RELEASE ORAL at 08:37

## 2023-03-15 RX ADMIN — SODIUM CHLORIDE, PRESERVATIVE FREE 10 ML: 5 INJECTION INTRAVENOUS at 22:00

## 2023-03-15 RX ADMIN — SERTRALINE 100 MG: 50 TABLET, FILM COATED ORAL at 21:56

## 2023-03-15 RX ADMIN — OXYCODONE HYDROCHLORIDE 5 MG: 5 TABLET ORAL at 19:43

## 2023-03-15 RX ADMIN — BUPROPION HYDROCHLORIDE 150 MG: 150 TABLET, EXTENDED RELEASE ORAL at 08:37

## 2023-03-15 RX ADMIN — SODIUM CHLORIDE, PRESERVATIVE FREE 10 ML: 5 INJECTION INTRAVENOUS at 17:10

## 2023-03-15 RX ADMIN — CARVEDILOL 12.5 MG: 12.5 TABLET, FILM COATED ORAL at 08:37

## 2023-03-15 RX ADMIN — BUPROPION HYDROCHLORIDE 150 MG: 150 TABLET, EXTENDED RELEASE ORAL at 21:56

## 2023-03-15 RX ADMIN — PRIMIDONE 50 MG: 50 TABLET ORAL at 08:37

## 2023-03-15 RX ADMIN — SODIUM CHLORIDE 1 G: 900 INJECTION INTRAVENOUS at 10:46

## 2023-03-15 RX ADMIN — INSULIN GLARGINE 8 UNITS: 100 INJECTION, SOLUTION SUBCUTANEOUS at 08:37

## 2023-03-15 RX ADMIN — ENOXAPARIN SODIUM 40 MG: 100 INJECTION SUBCUTANEOUS at 08:37

## 2023-03-15 RX ADMIN — PREDNISONE 40 MG: 20 TABLET ORAL at 08:37

## 2023-03-15 RX ADMIN — CARVEDILOL 12.5 MG: 12.5 TABLET, FILM COATED ORAL at 17:08

## 2023-03-15 RX ADMIN — GABAPENTIN 100 MG: 100 CAPSULE ORAL at 21:56

## 2023-03-15 RX ADMIN — GABAPENTIN 100 MG: 100 CAPSULE ORAL at 17:08

## 2023-03-15 RX ADMIN — MONTELUKAST 10 MG: 10 TABLET, FILM COATED ORAL at 21:57

## 2023-03-15 RX ADMIN — MICONAZOLE NITRATE: 20 CREAM TOPICAL at 21:00

## 2023-03-15 RX ADMIN — CASTOR OIL AND BALSAM, PERU: 788; 87 OINTMENT TOPICAL at 22:00

## 2023-03-15 RX ADMIN — CASTOR OIL AND BALSAM, PERU: 788; 87 OINTMENT TOPICAL at 17:09

## 2023-03-15 RX ADMIN — ZINC SULFATE 220 MG (50 MG) CAPSULE 1 CAPSULE: CAPSULE at 08:37

## 2023-03-15 RX ADMIN — ROSUVASTATIN CALCIUM 20 MG: 20 TABLET, COATED ORAL at 21:57

## 2023-03-15 RX ADMIN — GABAPENTIN 100 MG: 100 CAPSULE ORAL at 08:38

## 2023-03-15 RX ADMIN — Medication 5 UNITS: at 21:52

## 2023-03-15 RX ADMIN — MICONAZOLE NITRATE: 20 CREAM TOPICAL at 08:38

## 2023-03-15 RX ADMIN — CASTOR OIL AND BALSAM, PERU: 788; 87 OINTMENT TOPICAL at 08:37

## 2023-03-15 RX ADMIN — MICONAZOLE NITRATE: 20 CREAM TOPICAL at 04:26

## 2023-03-15 RX ADMIN — SERTRALINE 100 MG: 50 TABLET, FILM COATED ORAL at 08:37

## 2023-03-15 NOTE — PROGRESS NOTES
Problem: Mobility Impaired (Adult and Pediatric)  Goal: *Acute Goals and Plan of Care (Insert Text)  Description: FUNCTIONAL STATUS PRIOR TO ADMISSION: History provided with assist of son and paid caregiver who assists pt 9-4 daily (pt alone at night). Pt primarily uses w/c for mobility in home, able to transfer independently; uses RW with assist when going out. Able to amb into bathroom with grab bars as w/c does not fit through door. Caregiver assists pt with ADLs, med mgmt, meals. Pt has walk in shower with SC on first floor, however occasionally negotiates stairs to second floor with caregiver assist to access tub (grab bars in place for assist with in/ out of tub). Son/ caregiver note pt tendency to stay in (hospital) bed unless prompted to mobilize. No recent falls. Physical Therapy Goals  Initiated 3/13/2023  1. Patient will move from supine to sit and sit to supine  and roll side to side in bed with supervision/set-up within 7 day(s). 2.  Patient will transfer from bed to chair and chair to bed with supervision/set-up using the least restrictive device within 7 day(s). 3.  Patient will perform sit to stand with minimal assistance/contact guard assist within 7 day(s). 4.  Patient will ambulate with minimal assistance/contact guard assist for 25 feet with the least restrictive device within 7 day(s). Outcome: Progressing Towards Goal    PHYSICAL THERAPY TREATMENT  Patient: Maxwell Euceda (73 y.o. female)  Date: 3/15/2023  Diagnosis: Severe sepsis ([facility] [address] [A41.9, R65.20] Severe sepsis ([facility] [address]      Precautions:    Chart, physical therapy assessment, plan of care and goals were reviewed. ASSESSMENT  Patient continues with skilled PT services and is progressing towards goals. Pt was received in supine and cleared by nursing to mobilize. She was able to come to the EOB with additional time and use of bedrail. Provided RW and and stood.  Ambulated into the  and needed verbal cues for walker management. She fatigued fairly quickly and was returned to the room and sitting up in the chair. Performed part of HEP was left from previous therapists. Making good progress. Vitals stable during session    Current Level of Function Impacting Discharge (mobility/balance): CGA    Other factors to consider for discharge:          PLAN :  Patient continues to benefit from skilled intervention to address the above impairments. Continue treatment per established plan of care. to address goals. Recommendation for discharge: (in order for the patient to meet his/her long term goals)  Therapy up to 5 days/week in SNF setting    This discharge recommendation:  Has been made in collaboration with the attending provider and/or case management    IF patient discharges home will need the following DME: to be determined (TBD)       SUBJECTIVE:   Patient stated I think I am going to a nursing home for more therapy.     OBJECTIVE DATA SUMMARY:   Critical Behavior:  Neurologic State: Alert  Orientation Level: Oriented X4  Cognition: Follows commands  Safety/Judgement: Awareness of environment, Fall prevention, Decreased awareness of need for safety  Functional Mobility Training:  Bed Mobility:     Supine to Sit: Supervision     Scooting: Supervision        Transfers:  Sit to Stand: Contact guard assistance  Stand to Sit: Contact guard assistance                             Balance:  Sitting: Intact  Standing: Impaired  Standing - Static: Fair;Constant support  Standing - Dynamic : Fair;Constant support  Ambulation/Gait Training:  Distance (ft): 75 Feet (ft)  Assistive Device: Gait belt;Walker, rolling  Ambulation - Level of Assistance: Contact guard assistance        Gait Abnormalities: Decreased step clearance;Shuffling gait; Path deviations        Base of Support: Widened     Speed/Vivian: Pace decreased (<100 feet/min); Shuffled  Step Length: Left shortened;Right shortened                   Therapeutic Exercises:   UE HEP while sitting, packet in pt room (shoulder flexion, punches, scapular retraction, etc)  Pain Rating:  No complaints     Activity Tolerance:   Fair    After treatment patient left in no apparent distress:   Sitting in chair, Call bell within reach, and Bed / chair alarm activated    COMMUNICATION/COLLABORATION:   The patients plan of care was discussed with: Registered nurse.      Marry Jeffery, PT, DPT   Time Calculation: 39 mins

## 2023-03-15 NOTE — PROGRESS NOTES
INTERNAL MEDICINE PROGRESS NOTE    NAME:  Lanette Smith   :   1945   MRN:   [id number]     Date/Time:  3/15/2023 6:07 AM  Subjective:   History:  Chart reviewed and patient seen and examined and D/W his nurse this AM and all events noted. She is followed by me for DM, HTN, DJD, PMR, ILD with chronic hypoxemic respiratory failure, and other medical problems. She has now been admitted with Hypoglycemia and sepsis due to a likely UTI. She was brought into the ER yesterday after being found down by her family. She was noted by EMS to be hypoglycemic with blood sugar 38. This improved to 160 after D10 given by EMS. She had recently been treated for urinary tract infection at an urgent care center so I do not know what she was taking as she does not know the name of the antibiotic either. She currently denies any urinary discomfort although some frequency is still present. She denies any back pain abdominal pain and has no nausea vomiting. She does have her chronic shortness of breath with dyspnea on exertion however she has not yet qualified for home oxygen. There are no other cardiac or respiratory complaints. There are no current GI complaints. Other than general weakness there are no neurologic complaints and there are no other complaints on complete review of systems. She does feel that she is so weak that she needs SNF Rehab. PT note from yesterday reviewed and they are in agreement.       Medications reviewed:  Current Facility-Administered Medications   Medication Dose Route Frequency    miconazole (SECURA) 2 % extra thick cream   Topical BID    sodium chloride (NS) flush 5-10 mL  5-10 mL IntraVENous PRN    rosuvastatin (CRESTOR) tablet 20 mg  20 mg Oral QHS    primidone (MYSOLINE) tablet 50 mg  50 mg Oral BID    sertraline (ZOLOFT) tablet 100 mg  100 mg Oral BID    zinc sulfate (ZINCATE) 50 mg zinc (220 mg) capsule 1 Capsule  1 Capsule Oral DAILY    pantoprazole (PROTONIX) tablet 40 mg  40 mg Oral DAILY    oxyCODONE IR (ROXICODONE) tablet 5 mg  5 mg Oral Q4H PRN    montelukast (SINGULAIR) tablet 10 mg  10 mg Oral QHS    . PHARMACY TO SUBSTITUTE PER PROTOCOL (Reordered from: Ofev 150 mg cap)    Per Protocol    LORazepam (ATIVAN) tablet 1 mg  1 mg Oral Q8H PRN    gabapentin (NEURONTIN) capsule 100 mg  100 mg Oral TID    buPROPion ER (ZYBAN,BUPROBAN) tablet 150 mg  150 mg Oral BID    carvediloL (COREG) tablet 6.25 mg  6.25 mg Oral BID WITH MEALS    sodium chloride (NS) flush 5-40 mL  5-40 mL IntraVENous Q8H    sodium chloride (NS) flush 5-40 mL  5-40 mL IntraVENous PRN    acetaminophen (TYLENOL) tablet 650 mg  650 mg Oral Q6H PRN    Or    acetaminophen (TYLENOL) suppository 650 mg  650 mg Rectal Q6H PRN    polyethylene glycol (MIRALAX) packet 17 g  17 g Oral DAILY PRN    ondansetron (ZOFRAN ODT) tablet 4 mg  4 mg Oral Q8H PRN    Or    ondansetron (ZOFRAN) injection 4 mg  4 mg IntraVENous Q6H PRN    enoxaparin (LOVENOX) injection 40 mg  40 mg SubCUTAneous DAILY    predniSONE (DELTASONE) tablet 40 mg  40 mg Oral DAILY WITH BREAKFAST    cefTRIAXone (ROCEPHIN) 1 g in 0.9% sodium chloride (MBP/ADV) 50 mL MBP  1 g IntraVENous Q24H    balsam peru-castor oiL (VENELEX) ointment   Topical TID    glucose chewable tablet 16 g  4 Tablet Oral PRN    glucagon (GLUCAGEN) injection 1 mg  1 mg IntraMUSCular PRN    dextrose 10% infusion 0-250 mL  0-250 mL IntraVENous PRN        Objective:   Vitals:  Visit Vitals  BP (!) 177/93 (BP 1 Location: Right upper arm, BP Patient Position: At rest)   Pulse 70   Temp 97.8 °F (36.6 °C)   Resp 21   Ht 5' 3\" (1.6 m)   Wt 190 lb (86.2 kg)   SpO2 98%   BMI 33.66 kg/m²      O2 Device: None (Room air) Temp (24hrs), Av.1 °F (36.7 °C), Min:97.4 °F (36.3 °C), Max:98.4 °F (36.9 °C)      Last 24hr Input/Output:    Intake/Output Summary (Last 24 hours) at 3/15/2023 0746  Last data filed at 3/15/2023 0422  Gross per 24 hour   Intake 725.83 ml   Output 900 ml   Net -174.17 ml        PHYSICAL EXAM:  General:     Alert, cooperative, no distress, appears stated age. Head:    Normocephalic, without obvious abnormality, atraumatic. Eyes:    Conjunctivae/corneas clear. PERRLA  Nose:   Nares normal. No drainage or sinus tenderness. Throat:     Lips, mucosa, and tongue normal.  No Thrush  Neck:   Supple, symmetrical,  no adenopathy, thyroid: non tender     no carotid bruit and no JVD. Back:     Symmetric,  No CVA tenderness. Lungs:    Clear to auscultation bilaterally. No Wheezing or Rhonchi. No rales. Heart:    Regular rate and rhythm,  no murmur, rub or gallop. Abdomen:    Soft, non-tender. Not distended. Bowel sounds normal. No masses  Extremities:  Extremities normal, atraumatic, No cyanosis. No edema. No clubbing  Lymph nodes:  Cervical, supraclavicular normal.  Neurologic:  Normal strength, Alert and oriented X 3. Skin:                No rash.  Areas of pressure changes on elbows and buttocks addressed per wound care nurse per note yesterday 3/14      Lab Data Reviewed:    Recent Results (from the past 24 hour(s))   ECHO ADULT COMPLETE    Collection Time: 03/14/23  8:15 AM   Result Value Ref Range    IVSd 1.1 (A) 0.6 - 0.9 cm    LVIDd 3.6 (A) 3.9 - 5.3 cm    LVIDs 2.6 cm    LVOT Diameter 2.2 cm    LVPWd 1.0 (A) 0.6 - 0.9 cm    LVOT Peak Gradient 2 mmHg    LVOT Mean Gradient 1 mmHg    LVOT SV 56.2 ml    LVOT Peak Velocity 0.8 m/s    LVOT VTI 14.8 cm    LA Volume A/L 34 mL    LA Volume 2C 28 22 - 52 mL    LA Volume 4C 32 22 - 52 mL    AV Area by Peak Velocity 1.5 cm2    AV Area by VTI 1.5 cm2    AV Peak Gradient 15 mmHg    AV Mean Gradient 9 mmHg    AV Peak Velocity 2.0 m/s    AV Mean Velocity 1.4 m/s    AV VTI 37.2 cm    MV A Velocity 0.99 m/s    MV E Wave Deceleration Time 295.6 ms    MV E Velocity 0.67 m/s    LV E' Lateral Velocity 5 cm/s    LV E' Septal Velocity 4 cm/s    PV Peak Gradient 2 mmHg    PV Max Velocity 0.7 m/s    TAPSE 1.8 1.7 cm    Ascending Aorta 3.3 cm    Fractional Shortening 2D 28 28 - 44 %    LVIDd Index 1.90 cm/m2    LVIDs Index 1.38 cm/m2    LV RWT Ratio 0.56     LV Mass 2D 115.9 67 - 162 g    LV Mass 2D Index 61.3 43 - 95 g/m2    MV E/A 0.68     E/E' Ratio (Averaged) 15.08     E/E' Lateral 13.40     E/E' Septal 16.75     LA Volume Index A/L 18 16 - 34 mL/m2    LVOT Stroke Volume Index 29.8 mL/m2    LVOT Area 3.8 cm2    LA Volume Index 2C 15 (A) 16 - 34 mL/m2    LA Volume Index 4C 17 16 - 34 mL/m2    Ascending Aorta Index 1.75 cm/m2    AV Velocity Ratio 0.40     LVOT:AV VTI Index 0.40     ZAC/BSA VTI 0.8 cm2/m2    ZAC/BSA Peak Velocity 0.8 cm2/m2   GLUCOSE, POC    Collection Time: 03/14/23 10:50 AM   Result Value Ref Range    Glucose (POC) 168 (H) 65 - 117 mg/dL    Performed by Soto Isabela PCT    GLUCOSE, POC    Collection Time: 03/14/23  4:38 PM   Result Value Ref Range    Glucose (POC) 307 (H) 65 - 117 mg/dL    Performed by Soto Isabela PCT    GLUCOSE, POC    Collection Time: 03/14/23  8:43 PM   Result Value Ref Range    Glucose (POC) 308 (H) 65 - 117 mg/dL    Performed by Angie Turk          Assessment/Plan:     Principal Problem:    Severe sepsis ([facility] (3/12/2023)    Active Problems:    Controlled type 2 diabetes mellitus with stage 2 chronic kidney disease, with long-term current use of insulin ([facility] (4/20/2015)      Hypertension with renal disease (8/24/2017)      Interstitial lung disease ([facility] (4/9/2019)      Chronic hypoxemic respiratory failure ([facility] (5/15/2022)      Hypoglycemia (3/12/2023)      UTI (urinary tract infection) (3/12/2023)     ___________________________________________________  PLAN:    1. Continue Rocephin for  infection/sepsis  2. Follow-up on urine culture (Proteus) and blood cultures (NG 2 days). Urine culture from 3/10 had greater than 2 organisms so not IDed is felt to be contaminant by the lab problem  3.   D10 drip started and blood sugar was followed every 2 hours with all blood sugars above 100 since midnight 3/12 so changed IV to D5 half-normal saline 3/13AM and blood sugar did go up to 396 on 3/13 PM so discontinue IV  4. Lantus obviously on hold; however will resume as BS yesterday 166-307 range  5. Replete potassium which is 3.3 on 3/13 AM and now 4.3, Mag 1.5 now up to 2.0  6. Note lactate 2.36 on admission consistent with sepsis  7. PT and OT evaluation. Notes reviewed and recommend SNF Rehab so hopefully by end of week  8. Interstitial lung disease but not requiring oxygen  9. Anxiety/depression continue home Zoloft and BuSpar  10. Hyperlipidemia continue Crestor 20 mg  11. BP running a little high so resume her Coreg at lower dose. On 25 BID PTA. . Follow closely    50 Minutes spent today in direct care of this high complexity patient with greater than 50% in counseling and coordination of care.     If need to contact me use hospital  [phone], DO NOT USE PERFECT SERVE    ___________________________________________________    Attending Physician: Trenton Smiley MD

## 2023-03-16 LAB
ANION GAP SERPL CALC-SCNC: 4 MMOL/L (ref 5–15)
BUN SERPL-MCNC: 17 MG/DL (ref 6–20)
BUN/CREAT SERPL: 22 (ref 12–20)
CALCIUM SERPL-MCNC: 8.4 MG/DL (ref 8.5–10.1)
CHLORIDE SERPL-SCNC: 107 MMOL/L (ref 97–108)
CO2 SERPL-SCNC: 27 MMOL/L (ref 21–32)
CREAT SERPL-MCNC: 0.79 MG/DL (ref 0.55–1.02)
ERYTHROCYTE [DISTWIDTH] IN BLOOD BY AUTOMATED COUNT: 13.7 % (ref 11.5–14.5)
GLUCOSE BLD STRIP.AUTO-MCNC: 160 MG/DL (ref 65–117)
GLUCOSE BLD STRIP.AUTO-MCNC: 275 MG/DL (ref 65–117)
GLUCOSE BLD STRIP.AUTO-MCNC: 288 MG/DL (ref 65–117)
GLUCOSE BLD STRIP.AUTO-MCNC: 291 MG/DL (ref 65–117)
GLUCOSE SERPL-MCNC: 162 MG/DL (ref 65–100)
HCT VFR BLD AUTO: 41.2 % (ref 35–47)
HGB BLD-MCNC: 14.5 G/DL (ref 11.5–16)
MCH RBC QN AUTO: 35.4 PG (ref 26–34)
MCHC RBC AUTO-ENTMCNC: 35.2 G/DL (ref 30–36.5)
MCV RBC AUTO: 100.5 FL (ref 80–99)
NRBC # BLD: 0 K/UL (ref 0–0.01)
NRBC BLD-RTO: 0 PER 100 WBC
PLATELET # BLD AUTO: 139 K/UL (ref 150–400)
PMV BLD AUTO: 9.8 FL (ref 8.9–12.9)
POTASSIUM SERPL-SCNC: 3.5 MMOL/L (ref 3.5–5.1)
RBC # BLD AUTO: 4.1 M/UL (ref 3.8–5.2)
SERVICE CMNT-IMP: ABNORMAL
SODIUM SERPL-SCNC: 138 MMOL/L (ref 136–145)
WBC # BLD AUTO: 5.8 K/UL (ref 3.6–11)

## 2023-03-16 PROCEDURE — 74011250636 HC RX REV CODE- 250/636: Performed by: INTERNAL MEDICINE

## 2023-03-16 PROCEDURE — 80048 BASIC METABOLIC PNL TOTAL CA: CPT

## 2023-03-16 PROCEDURE — 74011636637 HC RX REV CODE- 636/637: Performed by: INTERNAL MEDICINE

## 2023-03-16 PROCEDURE — 85027 COMPLETE CBC AUTOMATED: CPT

## 2023-03-16 PROCEDURE — 36415 COLL VENOUS BLD VENIPUNCTURE: CPT

## 2023-03-16 PROCEDURE — 74011250637 HC RX REV CODE- 250/637: Performed by: INTERNAL MEDICINE

## 2023-03-16 PROCEDURE — 65270000046 HC RM TELEMETRY

## 2023-03-16 PROCEDURE — 97116 GAIT TRAINING THERAPY: CPT

## 2023-03-16 PROCEDURE — 74011000258 HC RX REV CODE- 258: Performed by: INTERNAL MEDICINE

## 2023-03-16 PROCEDURE — 99233 SBSQ HOSP IP/OBS HIGH 50: CPT | Performed by: INTERNAL MEDICINE

## 2023-03-16 PROCEDURE — 82962 GLUCOSE BLOOD TEST: CPT

## 2023-03-16 PROCEDURE — 74011000250 HC RX REV CODE- 250: Performed by: INTERNAL MEDICINE

## 2023-03-16 RX ORDER — INSULIN GLARGINE 100 [IU]/ML
12 INJECTION, SOLUTION SUBCUTANEOUS DAILY
Status: DISCONTINUED | OUTPATIENT
Start: 2023-03-16 | End: 2023-03-17 | Stop reason: HOSPADM

## 2023-03-16 RX ADMIN — SODIUM CHLORIDE, PRESERVATIVE FREE 10 ML: 5 INJECTION INTRAVENOUS at 22:17

## 2023-03-16 RX ADMIN — GABAPENTIN 100 MG: 100 CAPSULE ORAL at 22:16

## 2023-03-16 RX ADMIN — Medication 2 UNITS: at 09:12

## 2023-03-16 RX ADMIN — ENOXAPARIN SODIUM 40 MG: 100 INJECTION SUBCUTANEOUS at 09:13

## 2023-03-16 RX ADMIN — SERTRALINE 100 MG: 50 TABLET, FILM COATED ORAL at 22:16

## 2023-03-16 RX ADMIN — CASTOR OIL AND BALSAM, PERU: 788; 87 OINTMENT TOPICAL at 16:47

## 2023-03-16 RX ADMIN — BUPROPION HYDROCHLORIDE 150 MG: 150 TABLET, EXTENDED RELEASE ORAL at 09:11

## 2023-03-16 RX ADMIN — LORAZEPAM 1 MG: 1 TABLET ORAL at 22:16

## 2023-03-16 RX ADMIN — CASTOR OIL AND BALSAM, PERU: 788; 87 OINTMENT TOPICAL at 22:18

## 2023-03-16 RX ADMIN — PREDNISONE 40 MG: 20 TABLET ORAL at 09:11

## 2023-03-16 RX ADMIN — ROSUVASTATIN CALCIUM 20 MG: 20 TABLET, COATED ORAL at 22:16

## 2023-03-16 RX ADMIN — Medication 5 UNITS: at 12:04

## 2023-03-16 RX ADMIN — GABAPENTIN 100 MG: 100 CAPSULE ORAL at 09:11

## 2023-03-16 RX ADMIN — BUPROPION HYDROCHLORIDE 150 MG: 150 TABLET, EXTENDED RELEASE ORAL at 22:16

## 2023-03-16 RX ADMIN — GABAPENTIN 100 MG: 100 CAPSULE ORAL at 16:46

## 2023-03-16 RX ADMIN — Medication 5 UNITS: at 16:46

## 2023-03-16 RX ADMIN — SODIUM CHLORIDE, PRESERVATIVE FREE 10 ML: 5 INJECTION INTRAVENOUS at 06:00

## 2023-03-16 RX ADMIN — SERTRALINE 100 MG: 50 TABLET, FILM COATED ORAL at 09:11

## 2023-03-16 RX ADMIN — OXYCODONE HYDROCHLORIDE 5 MG: 5 TABLET ORAL at 22:17

## 2023-03-16 RX ADMIN — CARVEDILOL 12.5 MG: 12.5 TABLET, FILM COATED ORAL at 09:11

## 2023-03-16 RX ADMIN — ZINC SULFATE 220 MG (50 MG) CAPSULE 1 CAPSULE: CAPSULE at 09:11

## 2023-03-16 RX ADMIN — CARVEDILOL 12.5 MG: 12.5 TABLET, FILM COATED ORAL at 16:46

## 2023-03-16 RX ADMIN — CASTOR OIL AND BALSAM, PERU: 788; 87 OINTMENT TOPICAL at 09:13

## 2023-03-16 RX ADMIN — MONTELUKAST 10 MG: 10 TABLET, FILM COATED ORAL at 22:16

## 2023-03-16 RX ADMIN — MICONAZOLE NITRATE: 20 CREAM TOPICAL at 09:12

## 2023-03-16 RX ADMIN — PRIMIDONE 50 MG: 50 TABLET ORAL at 09:11

## 2023-03-16 RX ADMIN — PRIMIDONE 50 MG: 50 TABLET ORAL at 16:52

## 2023-03-16 RX ADMIN — MICONAZOLE NITRATE: 20 CREAM TOPICAL at 22:19

## 2023-03-16 RX ADMIN — SODIUM CHLORIDE 1 G: 900 INJECTION INTRAVENOUS at 11:41

## 2023-03-16 RX ADMIN — SODIUM CHLORIDE, PRESERVATIVE FREE 10 ML: 5 INJECTION INTRAVENOUS at 15:08

## 2023-03-16 RX ADMIN — PANTOPRAZOLE SODIUM 40 MG: 40 TABLET, DELAYED RELEASE ORAL at 09:12

## 2023-03-16 RX ADMIN — INSULIN GLARGINE 12 UNITS: 100 INJECTION, SOLUTION SUBCUTANEOUS at 09:12

## 2023-03-16 NOTE — PROGRESS NOTES
Occupational Therapy  Attempted to see patient this afternoon for OT treatment. She was in bed and sleeping, but easily aroused to name. Patient declined getting out of bed at this time, indicating that she has been up most of the day. Encouraged her to get up for dinner with assistance from nursing. Will defer for now but continue to follow.

## 2023-03-16 NOTE — PROGRESS NOTES
Patient up to chair since 0720. Patient requesting to return to bed. States she cannot control her bowels and her urine and wants her Purewick back on. Encouraged patient to stay in chair but patient continued to request to be placed back in bed. Patient transferred back in bed without incident.

## 2023-03-16 NOTE — PROGRESS NOTES
INTERNAL MEDICINE PROGRESS NOTE    NAME:  Haley Dinh   :   1945   MRN:   [id number]     Date/Time:  3/16/2023 6:08 AM  Subjective:   History:  Chart reviewed and patient seen and examined and D/W his nurse this AM and all events noted. She is followed by me for DM, HTN, DJD, PMR, ILD with chronic hypoxemic respiratory failure, and other medical problems. She has now been admitted with Hypoglycemia and sepsis due to a likely UTI. She was brought into the ER on 3/12 after being found down by her family. She was noted by EMS to be hypoglycemic with blood sugar 38. This improved to 160 after D10 given by EMS. She had recently been treated for urinary tract infection at an urgent care center so I do not know what she was taking as she does not know the name of the antibiotic either. She currently denies any urinary discomfort although some frequency is still present. She denies any back pain abdominal pain and has no nausea/vomiting. She does have her chronic shortness of breath with dyspnea on exertion however she has not yet qualified for home oxygen. There are no other cardiac or respiratory complaints. There are no current GI complaints. Other than general weakness there are no neurologic complaints and there are no other complaints on complete ROS. She does feel that she is so weak that she needs SNF Rehab. PT note from yesterday reviewed and they are still in agreement.       Medications reviewed:  Current Facility-Administered Medications   Medication Dose Route Frequency    carvediloL (COREG) tablet 12.5 mg  12.5 mg Oral BID WITH MEALS    insulin glargine (LANTUS) injection 8 Units  8 Units SubCUTAneous DAILY    insulin lispro (HUMALOG) injection   SubCUTAneous TIDAC    glucose chewable tablet 16 g  4 Tablet Oral PRN    glucagon (GLUCAGEN) injection 1 mg  1 mg IntraMUSCular PRN    dextrose 10% infusion 0-250 mL  0-250 mL IntraVENous PRN    miconazole (SECURA) 2 % extra thick cream   Topical BID    rosuvastatin (CRESTOR) tablet 20 mg  20 mg Oral QHS    primidone (MYSOLINE) tablet 50 mg  50 mg Oral BID    sertraline (ZOLOFT) tablet 100 mg  100 mg Oral BID    zinc sulfate (ZINCATE) 50 mg zinc (220 mg) capsule 1 Capsule  1 Capsule Oral DAILY    pantoprazole (PROTONIX) tablet 40 mg  40 mg Oral DAILY    oxyCODONE IR (ROXICODONE) tablet 5 mg  5 mg Oral Q4H PRN    montelukast (SINGULAIR) tablet 10 mg  10 mg Oral QHS    . PHARMACY TO SUBSTITUTE PER PROTOCOL (Reordered from: Ofev 150 mg cap)    Per Protocol    LORazepam (ATIVAN) tablet 1 mg  1 mg Oral Q8H PRN    gabapentin (NEURONTIN) capsule 100 mg  100 mg Oral TID    buPROPion ER (ZYBAN,BUPROBAN) tablet 150 mg  150 mg Oral BID    sodium chloride (NS) flush 5-40 mL  5-40 mL IntraVENous Q8H    sodium chloride (NS) flush 5-40 mL  5-40 mL IntraVENous PRN    acetaminophen (TYLENOL) tablet 650 mg  650 mg Oral Q6H PRN    Or    acetaminophen (TYLENOL) suppository 650 mg  650 mg Rectal Q6H PRN    polyethylene glycol (MIRALAX) packet 17 g  17 g Oral DAILY PRN    ondansetron (ZOFRAN ODT) tablet 4 mg  4 mg Oral Q8H PRN    Or    ondansetron (ZOFRAN) injection 4 mg  4 mg IntraVENous Q6H PRN    enoxaparin (LOVENOX) injection 40 mg  40 mg SubCUTAneous DAILY    predniSONE (DELTASONE) tablet 40 mg  40 mg Oral DAILY WITH BREAKFAST    cefTRIAXone (ROCEPHIN) 1 g in 0.9% sodium chloride (MBP/ADV) 50 mL MBP  1 g IntraVENous Q24H    balsam peru-castor oiL (VENELEX) ointment   Topical TID    glucose chewable tablet 16 g  4 Tablet Oral PRN    dextrose 10% infusion 0-250 mL  0-250 mL IntraVENous PRN        Objective:   Vitals:  Visit Vitals  BP (!) 133/90 (BP 1 Location: Right upper arm, BP Patient Position: At rest)   Pulse 68   Temp 98.5 °F (36.9 °C)   Resp 13   Ht 5' 3\" (1.6 m)   Wt 190 lb (86.2 kg)   SpO2 95%   BMI 33.66 kg/m²      O2 Device: None (Room air) Temp (24hrs), Av.1 °F (36.7 °C), Min:97.7 °F (36.5 °C), Max:98.5 °F (36.9 °C)      Last 24hr Input/Output:  No intake or output data in the 24 hours ending 03/16/23 0608       PHYSICAL EXAM:  General:     Alert, cooperative, no distress, appears stated age. Head:    Normocephalic, without obvious abnormality, atraumatic. Eyes:    Conjunctivae/corneas clear. PERRLA  Nose:   Nares normal. No drainage or sinus tenderness. Throat:     Lips, mucosa, and tongue normal.  No Thrush  Neck:   Supple, symmetrical,  no adenopathy, thyroid: non tender     no carotid bruit and no JVD. Back:     Symmetric,  No CVA tenderness. Lungs:    Clear to auscultation bilaterally. No Wheezing or Rhonchi. No rales. Heart:    Regular rate and rhythm,  no murmur, rub or gallop. Abdomen:    Soft, non-tender. Not distended. Bowel sounds normal. No masses  Extremities:  Extremities normal, atraumatic, No cyanosis. No edema. No clubbing  Lymph nodes:  Cervical, supraclavicular normal.  Neurologic:  Normal strength, Alert and oriented X 3. Skin:                No rash.  Areas of pressure changes on elbows and buttocks addressed per wound care nurse per note yesterday 3/14      Lab Data Reviewed:    Recent Results (from the past 24 hour(s))   GLUCOSE, POC    Collection Time: 03/15/23  9:03 PM   Result Value Ref Range    Glucose (POC) 353 (H) 65 - 117 mg/dL    Performed by Inez Cade    GLUCOSE, POC    Collection Time: 03/15/23  9:04 PM   Result Value Ref Range    Glucose (POC) 338 (H) 65 - 117 mg/dL    Performed by Inez Cade          Assessment/Plan:     Principal Problem:    Severe sepsis ([address] (3/12/2023)    Active Problems:    Controlled type 2 diabetes mellitus with stage 2 chronic kidney disease, with long-term current use of insulin ([address] (4/20/2015)      Hypertension with renal disease (8/24/2017)      Interstitial lung disease ([address] (4/9/2019)      Chronic hypoxemic respiratory failure ([address] (5/15/2022)      Hypoglycemia (3/12/2023)      UTI (urinary tract infection) (3/12/2023) ___________________________________________________  PLAN:    1. Continue Rocephin for  infection/sepsis, Sensitivity appropriate  2. Follow-up on urine culture (Proteus)& E. Coli) and blood cultures (NG 3 days). Urine culture from 3/10 had greater than 2 organisms so not IDed is felt to be contaminant by the lab problem  3. D10 drip started and blood sugar was followed every 2 hours with all blood sugars above 100 since midnight 3/12 so changed IV to D5 half-normal saline 3/13AM and blood sugar did go up to 396 on 3/13 PM so discontinued IV  4. Lantus initially on hold; however resumed as BS on 3/14 was 166-307 range and yesterday 300s all day so increase  5. Replete potassium which is 3.3 on 3/13 AM and now 3.5, Mag 1.5 now up to 2.0  6. Note lactate 2.36 on admission consistent with sepsis  7. PT and OT evaluation. Notes reviewed and recommend SNF Rehab so hopefully by end of week  8. Interstitial lung disease but not requiring oxygen  9. Anxiety/depression continue home Zoloft and BuSpar  10. Hyperlipidemia continue Crestor 20 mg  11. BP running a little high so resumed her Coreg at lower dose. On 25 BID PTA. . Follow closely (Variable yesterday, ? Due to digital cuff or real?)    50 Minutes spent today in direct care of this high complexity patient with greater than 50% in counseling and coordination of care.     If need to contact me use hospital  [phone], DO NOT USE PERFECT SERVE    ___________________________________________________    Attending Physician: Vivi Hinojosa MD

## 2023-03-16 NOTE — PROGRESS NOTES
Problem: Pressure Injury - Risk of  Goal: *Prevention of pressure injury  Description: Document Hari Scale and appropriate interventions in the flowsheet. Outcome: Progressing Towards Goal  Note: Pressure Injury Interventions:  Sensory Interventions: Assess changes in LOC, Float heels, Keep linens dry and wrinkle-free    Moisture Interventions: Absorbent underpads, Apply protective barrier, creams and emollients, Internal/External urinary devices, Limit adult briefs, Maintain skin hydration (lotion/cream), Minimize layers    Activity Interventions: Increase time out of bed, Assess need for specialty bed    Mobility Interventions: Assess need for specialty bed, Float heels    Nutrition Interventions: Document food/fluid/supplement intake, Offer support with meals,snacks and hydration    Friction and Shear Interventions: Apply protective barrier, creams and emollients, Foam dressings/transparent film/skin sealants                Problem: Patient Education: Go to Patient Education Activity  Goal: Patient/Family Education  Outcome: Progressing Towards Goal     Problem: Falls - Risk of  Goal: *Absence of Falls  Description: Document Elmer Fall Risk and appropriate interventions in the flowsheet.   Outcome: Progressing Towards Goal  Note: Fall Risk Interventions:  Mobility Interventions: Utilize walker, cane, or other assistive device, Utilize gait belt for transfers/ambulation, Bed/chair exit alarm, Communicate number of staff needed for ambulation/transfer, Patient to call before getting OOB         Medication Interventions: Patient to call before getting OOB, Teach patient to arise slowly    Elimination Interventions: Patient to call for help with toileting needs, Elevated toilet seat, Call light in reach, Bed/chair exit alarm              Problem: Patient Education: Go to Patient Education Activity  Goal: Patient/Family Education  Outcome: Progressing Towards Goal     Problem: Urinary Tract Infection - Adult  Goal: *Absence of infection signs and symptoms  Outcome: Progressing Towards Goal     Problem: Patient Education: Go to Patient Education Activity  Goal: Patient/Family Education  Outcome: Progressing Towards Goal     Problem: Diabetes Self-Management  Goal: *Disease process and treatment process  Description: Define diabetes and identify own type of diabetes; list 3 options for treating diabetes. Outcome: Progressing Towards Goal  Goal: *Incorporating nutritional management into lifestyle  Description: Describe effect of type, amount and timing of food on blood glucose; list 3 methods for planning meals. Outcome: Progressing Towards Goal  Goal: *Incorporating physical activity into lifestyle  Description: State effect of exercise on blood glucose levels. Outcome: Progressing Towards Goal  Goal: *Developing strategies to promote health/change behavior  Description: Define the ABC's of diabetes; identify appropriate screenings, schedule and personal plan for screenings. Outcome: Progressing Towards Goal  Goal: *Using medications safely  Description: State effect of diabetes medications on diabetes; name diabetes medication taking, action and side effects. Outcome: Progressing Towards Goal  Goal: *Monitoring blood glucose, interpreting and using results  Description: Identify recommended blood glucose targets  and personal targets. Outcome: Progressing Towards Goal  Goal: *Prevention, detection, treatment of acute complications  Description: List symptoms of hyper- and hypoglycemia; describe how to treat low blood sugar and actions for lowering  high blood glucose level. Outcome: Progressing Towards Goal  Goal: *Prevention, detection and treatment of chronic complications  Description: Define the natural course of diabetes and describe the relationship of blood glucose levels to long term complications of diabetes.   Outcome: Progressing Towards Goal  Goal: *Developing strategies to address psychosocial issues  Description: Describe feelings about living with diabetes; identify support needed and support network  Outcome: Progressing Towards Goal  Goal: *Insulin pump training  Outcome: Progressing Towards Goal  Goal: *Sick day guidelines  Outcome: Progressing Towards Goal  Goal: *Patient Specific Goal (EDIT GOAL, INSERT TEXT)  Outcome: Progressing Towards Goal     Problem: Patient Education: Go to Patient Education Activity  Goal: Patient/Family Education  Outcome: Progressing Towards Goal     Problem: Patient Education: Go to Patient Education Activity  Goal: Patient/Family Education  Outcome: Progressing Towards Goal     Problem: Patient Education: Go to Patient Education Activity  Goal: Patient/Family Education  Outcome: Progressing Towards Goal

## 2023-03-16 NOTE — PROGRESS NOTES
End of Shift Note    Bedside shift change report given to Jadiel Greenberg (oncoming nurse) by Js Correa RN (offgoing nurse). Report included the following information SBAR, Kardex, MAR, Med Rec Status, Cardiac Rhythm NSR, and Alarm Parameters     Shift worked:  7A-7P     Shift summary and any significant changes:     Patient reluctant to stay in chair. Impulsive and a fall risk. Concerns for physician to address:  None     Zone phone for oncoming shift:   None       Activity:  Activity Level: Up ad jean  Number times ambulated in hallways past shift: 0  Number of times OOB to chair past shift: 0    Cardiac:   Cardiac Monitoring: Yes      Cardiac Rhythm: Sinus Rhythm    Access:  Current line(s): PIV     Genitourinary:   Urinary status: voiding    Respiratory:   O2 Device: None (Room air)  Chronic home O2 use?: No  Incentive spirometer at bedside: NO       GI:  Last Bowel Movement Date: 03/16/23  Current diet:  ADULT ORAL NUTRITION SUPPLEMENT Breakfast, Dinner; Low Calorie/High Protein  ADULT DIET Regular; 3 carb choices (45 gm/meal)  DIET ONE TIME MESSAGE  Passing flatus: YES  Tolerating current diet: YES       Pain Management:   Patient states pain is manageable on current regimen: YES    Skin:  Hari Score: 16  Interventions: increase time out of bed    Patient Safety:  Fall Score:  Total Score: 3  Interventions: bed/chair alarm  High Fall Risk: Yes    Length of Stay:  Expected LOS: 3d 12h  Actual LOS: 3700 Rylan Ruggiero RN

## 2023-03-16 NOTE — PROGRESS NOTES
Problem: Mobility Impaired (Adult and Pediatric)  Goal: *Acute Goals and Plan of Care (Insert Text)  Description: FUNCTIONAL STATUS PRIOR TO ADMISSION: History provided with assist of son and paid caregiver who assists pt 9-4 daily (pt alone at night). Pt primarily uses w/c for mobility in home, able to transfer independently; uses RW with assist when going out. Able to amb into bathroom with grab bars as w/c does not fit through door. Caregiver assists pt with ADLs, med mgmt, meals. Pt has walk in shower with SC on first floor, however occasionally negotiates stairs to second floor with caregiver assist to access tub (grab bars in place for assist with in/ out of tub). Son/ caregiver note pt tendency to stay in (hospital) bed unless prompted to mobilize. No recent falls. Physical Therapy Goals  Initiated 3/13/2023  1. Patient will move from supine to sit and sit to supine  and roll side to side in bed with supervision/set-up within 7 day(s). 2.  Patient will transfer from bed to chair and chair to bed with supervision/set-up using the least restrictive device within 7 day(s). 3.  Patient will perform sit to stand with minimal assistance/contact guard assist within 7 day(s). 4.  Patient will ambulate with minimal assistance/contact guard assist for 25 feet with the least restrictive device within 7 day(s). Outcome: Progressing Towards Goal    PHYSICAL THERAPY TREATMENT  Patient: Ranjana Toth (89 y.o. female)  Date: 3/16/2023  Diagnosis: Severe sepsis ([facility] [A41.9, R65.20] Severe sepsis ([facility]      Precautions:    Chart, physical therapy assessment, plan of care and goals were reviewed. ASSESSMENT  Patient continues with skilled PT services and is slowly progressing towards goals. Patient continues to present with impaired safety awareness and impulsivity. Upon entering the room patient was laying in bed, reported feeling tired but willing to work with therapy.  She tried to get out of bed numerous times on her own while therapist was gathering supplies and getting the room set-up. Each time therapist provided cues for patient to wait until therapist was ready, however no carryover. She was able to perform supine-sit transfer with supervision assist, however HOB was significantly elevated. She requested to use the bedside commode prior to walking, able to transfer with contact guard assist, verbal cues each time for proper hand placement. She ambulated 76' in the hallway with RW and contact guard/Rashel (especially toward the end due to leg fatigue causing increased shaking and unsteadiness). PT tried to cue patient earlier in the walk to turn around, educated on signs of fatigue, however patient was very persistent to walk as far as she did yesterday. She was left sitting up in chair, call bell  within reach, chair alarm activated, no complaints. Continue to recommend SNF upon discharge. Current Level of Function Impacting Discharge (mobility/balance): contact guard assist for transfers and ambulation using RW    Other factors to consider for discharge: decreased safety awareness, impulsive, lives alone with caregiver only there from 9 AM to 5 PM         PLAN :  Patient continues to benefit from skilled intervention to address the above impairments. Continue treatment per established plan of care. to address goals. Recommendation for discharge: (in order for the patient to meet his/her long term goals)  Therapy up to 5 days/week in SNF setting    This discharge recommendation:  Has been made in collaboration with the attending provider and/or case management    IF patient discharges home will need the following DME: patient owns DME required for discharge       SUBJECTIVE:   Patient stated I am tired but I know I need to walk.     OBJECTIVE DATA SUMMARY:   Critical Behavior:  Neurologic State: Alert  Orientation Level: Disoriented X4  Cognition: Follows commands  Safety/Judgement: Awareness of environment, Fall prevention, Decreased awareness of need for safety  Functional Mobility Training:  Bed Mobility:   Supine to Sit: Supervision;Bed Modified   Scooting: Supervision    Transfers:  Sit to Stand: Contact guard assistance  Stand to Sit: Contact guard assistance    Balance:  Sitting: Intact  Standing: Impaired  Standing - Static: Good;Constant support  Standing - Dynamic : Fair;Constant support  Ambulation/Gait Training:  Distance (ft): 75 Feet (ft)  Assistive Device: Gait belt;Walker, rolling  Ambulation - Level of Assistance: Contact guard assistance;Minimal assistance  Gait Abnormalities: Decreased step clearance  Base of Support: Widened   Speed/Vivian: Pace decreased (<100 feet/min)  Step Length: Left shortened;Right shortened          Therapeutic Exercises:   none  Pain Rating:  none    Activity Tolerance:   Fair    After treatment patient left in no apparent distress:   Sitting in chair, Call bell within reach, Bed / chair alarm activated, and Caregiver / family present    COMMUNICATION/COLLABORATION:   The patients plan of care was discussed with: Registered nurse.      Bolivar Jeans, PT   Time Calculation: 21 mins

## 2023-03-16 NOTE — PROGRESS NOTES
Transition of Care Plan:     RUR: 13% (low RUR)  Disposition: [facility]  If SNF or IPR: Date FOC offered: N/A  Date FOC received: N/A  Date authorization started with reference number: N/A  Date authorization received and expires: N/A  Accepting facility: N/A  Follow up appointments: PCP/specialists if needed. DME needed: None. Patient has RW, rollator, 2 canes and a raised toilet seat. Transportation at Discharge: [facility] anticipated. Keys or means to access home: Patient has access. IM Medicare Letter: 2nd IM needed prior to discharge. Is patient a [place] and connected with the [place]? No.               If yes, was [employer] transfer form completed and VA notified? N/A  Caregiver Contact: Veronica Rush [facility] - [phone]  Discharge Caregiver contacted prior to discharge? Patient to contact. Care Conference needed?: No.                [facility] notified that patient's discharge has been moved to tomorrow.        ABNER FoxN, RN     Care Management  [phone]

## 2023-03-17 VITALS
WEIGHT: 188.05 LBS | DIASTOLIC BLOOD PRESSURE: 78 MMHG | HEART RATE: 73 BPM | BODY MASS INDEX: 33.32 KG/M2 | OXYGEN SATURATION: 94 % | SYSTOLIC BLOOD PRESSURE: 126 MMHG | TEMPERATURE: 99.1 F | HEIGHT: 63 IN | RESPIRATION RATE: 20 BRPM

## 2023-03-17 LAB
ANION GAP SERPL CALC-SCNC: 4 MMOL/L (ref 5–15)
BUN SERPL-MCNC: 20 MG/DL (ref 6–20)
BUN/CREAT SERPL: 25 (ref 12–20)
CALCIUM SERPL-MCNC: 8.1 MG/DL (ref 8.5–10.1)
CHLORIDE SERPL-SCNC: 104 MMOL/L (ref 97–108)
CO2 SERPL-SCNC: 27 MMOL/L (ref 21–32)
CREAT SERPL-MCNC: 0.79 MG/DL (ref 0.55–1.02)
GLUCOSE BLD STRIP.AUTO-MCNC: 201 MG/DL (ref 65–117)
GLUCOSE BLD STRIP.AUTO-MCNC: 290 MG/DL (ref 65–117)
GLUCOSE SERPL-MCNC: 247 MG/DL (ref 65–100)
POTASSIUM SERPL-SCNC: 3.4 MMOL/L (ref 3.5–5.1)
SERVICE CMNT-IMP: ABNORMAL
SERVICE CMNT-IMP: ABNORMAL
SODIUM SERPL-SCNC: 135 MMOL/L (ref 136–145)

## 2023-03-17 PROCEDURE — 99239 HOSP IP/OBS DSCHRG MGMT >30: CPT | Performed by: INTERNAL MEDICINE

## 2023-03-17 PROCEDURE — 74011250637 HC RX REV CODE- 250/637: Performed by: INTERNAL MEDICINE

## 2023-03-17 PROCEDURE — 82962 GLUCOSE BLOOD TEST: CPT

## 2023-03-17 PROCEDURE — 80048 BASIC METABOLIC PNL TOTAL CA: CPT

## 2023-03-17 PROCEDURE — 74011000250 HC RX REV CODE- 250: Performed by: INTERNAL MEDICINE

## 2023-03-17 PROCEDURE — 36415 COLL VENOUS BLD VENIPUNCTURE: CPT

## 2023-03-17 PROCEDURE — 74011636637 HC RX REV CODE- 636/637: Performed by: INTERNAL MEDICINE

## 2023-03-17 PROCEDURE — 74011000258 HC RX REV CODE- 258: Performed by: INTERNAL MEDICINE

## 2023-03-17 PROCEDURE — 74011250636 HC RX REV CODE- 250/636: Performed by: INTERNAL MEDICINE

## 2023-03-17 RX ORDER — CARVEDILOL 12.5 MG/1
12.5 TABLET ORAL 2 TIMES DAILY
Qty: 60 TABLET | Status: SHIPPED | OUTPATIENT
Start: 2023-03-17

## 2023-03-17 RX ORDER — INSULIN GLARGINE 100 [IU]/ML
20 INJECTION, SOLUTION SUBCUTANEOUS DAILY
Qty: 1 ML | Refills: 0 | Status: SHIPPED | OUTPATIENT
Start: 2023-03-17

## 2023-03-17 RX ORDER — POTASSIUM CHLORIDE 20 MEQ/1
20 TABLET, EXTENDED RELEASE ORAL 2 TIMES DAILY
Qty: 270 TABLET | Refills: 3 | Status: SHIPPED | OUTPATIENT
Start: 2023-03-17

## 2023-03-17 RX ORDER — LEVOFLOXACIN 500 MG/1
500 TABLET, FILM COATED ORAL DAILY
Qty: 5 TABLET | Refills: 0 | Status: SHIPPED | OUTPATIENT
Start: 2023-03-17

## 2023-03-17 RX ADMIN — Medication 3 UNITS: at 09:39

## 2023-03-17 RX ADMIN — CASTOR OIL AND BALSAM, PERU: 788; 87 OINTMENT TOPICAL at 09:40

## 2023-03-17 RX ADMIN — PANTOPRAZOLE SODIUM 40 MG: 40 TABLET, DELAYED RELEASE ORAL at 09:39

## 2023-03-17 RX ADMIN — SODIUM CHLORIDE, PRESERVATIVE FREE 10 ML: 5 INJECTION INTRAVENOUS at 07:30

## 2023-03-17 RX ADMIN — MICONAZOLE NITRATE: 20 CREAM TOPICAL at 09:40

## 2023-03-17 RX ADMIN — PRIMIDONE 50 MG: 50 TABLET ORAL at 09:40

## 2023-03-17 RX ADMIN — INSULIN GLARGINE 12 UNITS: 100 INJECTION, SOLUTION SUBCUTANEOUS at 09:38

## 2023-03-17 RX ADMIN — BUPROPION HYDROCHLORIDE 150 MG: 150 TABLET, EXTENDED RELEASE ORAL at 09:39

## 2023-03-17 RX ADMIN — CARVEDILOL 12.5 MG: 12.5 TABLET, FILM COATED ORAL at 09:40

## 2023-03-17 RX ADMIN — Medication 5 UNITS: at 12:44

## 2023-03-17 RX ADMIN — ENOXAPARIN SODIUM 40 MG: 100 INJECTION SUBCUTANEOUS at 09:39

## 2023-03-17 RX ADMIN — SODIUM CHLORIDE 1 G: 900 INJECTION INTRAVENOUS at 12:11

## 2023-03-17 RX ADMIN — ZINC SULFATE 220 MG (50 MG) CAPSULE 1 CAPSULE: CAPSULE at 09:39

## 2023-03-17 RX ADMIN — PREDNISONE 40 MG: 20 TABLET ORAL at 09:40

## 2023-03-17 RX ADMIN — ACETAMINOPHEN 325MG 650 MG: 325 TABLET ORAL at 12:11

## 2023-03-17 RX ADMIN — GABAPENTIN 100 MG: 100 CAPSULE ORAL at 09:40

## 2023-03-17 RX ADMIN — SERTRALINE 100 MG: 50 TABLET, FILM COATED ORAL at 09:40

## 2023-03-17 NOTE — DISCHARGE SUMMARY
[address] SUMMARY    Name:  Nika Ambrose  MR#:  [id number]  :  1945  ACCOUNT #:  [de-identified]  ADMIT DATE:  2023  DISCHARGE DATE:  2023    FINAL DIAGNOSES:  1. Sepsis secondary to urinary source. 2.  Urinary tract infection with Escherichia coli and Proteus. 3.  Hypoglycemia related to sepsis secondary to urinary source. 4.  Diabetes mellitus. 5.  Hypertension. 6.  Chronic interstitial lung disease. 7.  Chronic hypoxemic respiratory failure, not requiring oxygen. CONSULTATIONS:  None. PROCEDURES:  None. For details of admission history and physical, please see admit note. HOSPITAL COURSE:  Briefly, the patient is a 68-year-old white female who was found at home by her family poorly to unresponsive and 911 was called. When EMR arrived, they found her blood sugar of 38 and this responded to D50 IV. She was brought to the emergency room where she was found to have an elevated lactic acid and felt to be septic. She had urine culture sent which did return positive for E. coli and Proteus. She had been started on Rocephin at this time on admission and sensitivities were appropriate. With the IV antibiotic, she had continued improvement of her symptoms. Her blood sugars were followed initially requiring D10 drip and then subsequently switched to D5 drip before discontinuing IV and following blood sugars and subsequently requiring insulin to be restarted in the form of Lantus on the lower dose. Blood sugars were followed and treated with sliding scale and at this point it was felt that secondary to her general debility, she would benefit from rehab. She was seen by Physical Therapy and Occasional Therapy who were both in agreement, so at this point she will be discharged to [facility] and be followed by me afterwards. DISCHARGE MEDICATIONS:  Consist of:  1.   A new medication of Levaquin 500 daily for an additional 5 days to complete treatment of her urinary tract infection/ sepsis. 2.  Her carvedilol dose is changed to 12.5 b.i.d.  3.  Insulin dose is decreased to Lantus at 20 units daily. 4.  Her Novolin NPH is currently stopped to possibly resume at a lower dose as an outpatient. 5.  Potassium 20 mEq b.i.d. has been decreased from t.i.d.  6.  Also discontinued her Imodium, oxycodone, and zinc.    Her other medications that will be continued the same as prior to admission consist of:  1. ProAir inhaler 2 puffs four times a day p.r.n.  2.  Tessalon 200 mg t.i.d. p.r.n. for cough. 3.  Wellbutrin  b.i.d.  4.  Klonopin 1 tablet by mouth daily. 5.  Lotrisone lotion to the affected area b.i.d.  6.  Santyl ointment to affected areas of skin breakdown b.i.d.  7.  Dulera 200/5 two puffs b.i.d.  8.  Fish oil tablet 1 capsule daily. 9.  Gabapentin 100 mg three times daily. 10.  HydroDiuril 25 mg daily. 11.  Ativan 1 mg every 8 hours as needed for anxiety. 12.  Singulair 10 mg daily. 13.  Ofev 150 mg capsule b.i.d.  14.  Protonix 40 mg daily. 15.  Prednisone 40 mg daily. 16.  Mysoline 50 mg b.i.d.  17.  Crestor 20 mg daily. 18.  Zoloft 100 mg b.i.d. She will have followup by me in about 48 hours after discharge from rehab. Forty five minutes spent in direct care of this patient at the time of discharge.       Mona Campos MD      [id number]  D:  03/17/2023 7:56  T:  03/17/2023 12:10  JOB #:  [id number]  CC:  [address] [place] [address]

## 2023-03-17 NOTE — PROGRESS NOTES
TRANSFER - OUT REPORT:    Verbal report given to Valentino Mulder, RN(name) on Anay Head  being transferred to [facility] (unit) for routine progression of care       Report consisted of patients Situation, Background, Assessment and   Recommendations(SBAR). Information from the following report(s) SBAR was reviewed with the receiving nurse. Lines:   Peripheral IV 03/14/23 Left Antecubital (Active)   Site Assessment Clean, dry, & intact 03/17/23 1151   Phlebitis Assessment 0 03/17/23 1151   Infiltration Assessment 0 03/17/23 1151   Dressing Status Clean, dry, & intact 03/17/23 1151   Dressing Type Transparent;Tape 03/17/23 1151   Hub Color/Line Status Blue;Flushed;Capped 03/17/23 1151   Action Taken Open ports on tubing capped 03/17/23 0404   Alcohol Cap Used Yes 03/17/23 1151        Opportunity for questions and clarification was provided.       Patient transported with:   Monitor    Transported via [facility]

## 2023-03-17 NOTE — PROGRESS NOTES
Patient is clear from a CM standpoint. Transition of Care Plan: [facility]     RUR: 13% (low RUR)  Disposition: [facility]  If SNF or IPR: Date FOC offered: 3/15/2023  Date [address] received: 3/15/2023  Date authorization started with reference number: N/A  Date authorization received and expires: N/A  Accepting facility: [facility]  Follow up appointments: defer to SNF. DME needed: defer to SNF. Patient has RW, rollator, 2 canes and a raised toilet seat. Transportation at Discharge: [facility] at 2:45[facility], to [address] [place] [address], bed 42 A, nursing call report to: [phone]. Keys or means to access home: Patient has access. IM Medicare Letter: 2nd IM received 3/17/2023  Is patient a  and connected with the [employer] [facility]? No.               If yes, was [employer] transfer form completed and VA notified? N/A  Caregiver Contact: Staci Rush son - [phone]  Discharge Caregiver contacted prior to discharge? Patient to contact. Care Conference needed?: No.    Discharge orders placed. Patient has bed available at [address] [place] [address] as confirmed by [facility], confirmed patient can transport at 2pm.    [facility] confirmed to be at 2:45 PM.  Patient agreeable to transportation at [address] [place] [address] at 2:45 PM with [facility]. 2nd IM received by patient and signed copy placed on chart. Patient had no further questions or concerns for CM. RN aware of transport at 2:45 PM.  [facility] paperwork placed on bedside chart. Transition of Care Plan to SNF/Rehab    Communication to Patient/Family:  Met with patient and family and they are agreeable to the transition plan. The Plan for Transition of Care is related to the following treatment goals: PT/OT    The Patient and/or patient representative was provided with a choice of provider and agrees  with the discharge plan.       Yes [x] No []    A Freedom of choice list was provided with basic dialogue that supports the patient's individualized plan of care/goals and shares the quality data associated with the providers. Yes [x] No []    SNF/Rehab Transition:  Patient has been accepted to [facility] SNF/Rehab and meets criteria for admission. Patient will transported by [facility] and expected to leave at [facility].    Communication to SNF/Rehab:  Bedside RN, Jhony Holguin, has been notified to update the transition plan to the facility and call report (phone number). Discharge information has been updated on the AVS. And communicated to facility via Drive/All Scripts, or CC link. Discharge instructions to be sent with patient via paper copy    Nursing Please include all hard scripts for controlled substances, med rec and dc summary, and AVS in packet. Reviewed and confirmed with facility, [facility], can manage the patient care needs for the following:     Gayle Car with (X) only those applicable:  Medication:  [x]Medications are available at the facility  []IV Antibiotics    []Controlled Substance - hard copies available sent. []Weekly Labs    Equipment:  []CPAP/BiPAP  []Wound Vacuum  []Mejia or Urinary Device  []PICC/Central Line  []Nebulizer  []Ventilator    Treatment:  []Isolation (for MRSA, VRE, etc.)  []Surgical Drain Management  []Tracheostomy Care  []Dressing Changes  []Dialysis with transportation  []PEG Care  []Oxygen  []Daily Weights for Heart Failure    Dietary:  []Any diet limitations  []Tube Feedings   []Total Parenteral Management (TPN)    Financial Resources:  []Medicaid Application Completed    []UAI Completed and copy given to pt/family  and copy given to pt/family  []A screening has previously been completed. []Level II Completed    [] Private pay individual who will not become   financially eligible for Medicaid within 6 months from admission to a [address] [place] [address] facility. [] Individual refused to have screening conducted.      []Medicaid Application Completed    []The screening denied because it was determined individual did not need/did not qualify for nursing facility level of care. [] Out of state residents seeking direct admission to a [address] facility. [] Individuals who are inpatients of an out of state hospital, or in state or out of state veterans/ hospital and seek direct admission to a [address] facility  [] Individuals who are pateints or residents of a state owned/operated facility that is licensed by Department of [employer] ([facility]) and seek direct admission to [address] [place] [address]  [] A screening not required for enrollment in [address] [address] [address] services as set out in [address] [place] [address] 30-  [] [facility] staff shall perform screenings of the [facility] clients. Advanced Care Plan:  []Surrogate Decision Maker of Care  []POA  []Communicated Code Status and copy sent.     Other:            ABNER WatsonN, RN    Care Management  [phone]

## 2023-03-17 NOTE — DISCHARGE INSTRUCTIONS
Doctor Irene [address]  [phone]      Patient Discharge Instructions    Antoine Mcmahan / [id number] : 1945    Admitted 3/12/2023 Discharged: 3/17/2023     Principal Problem:    Severe sepsis ([facility] (3/12/2023)    Active Problems:    Controlled type 2 diabetes mellitus with stage 2 chronic kidney disease, with long-term current use of insulin ([facility] (2015)      Hypertension with renal disease (2017)      Interstitial lung disease ([facility] (2019)      Chronic hypoxemic respiratory failure ([facility] (5/15/2022)      Hypoglycemia (3/12/2023)      UTI (urinary tract infection) (3/12/2023)          Allergies   Allergen Reactions    Metformin Diarrhea    Statins-Hmg-Coa Reductase Inhibitors Myalgia     Myalgia with  multiple statins  Takes pravachol     Codeine Rash     Rash on thighs    Darvon [Propoxyphene] Other (comments)     \"I see worms\"    Pcn [Penicillins] Rash    Sulfa (Sulfonamide Antibiotics) Rash       It is important that you take the medication exactly as they are prescribed. Do not take other medications without consulting your doctor. What to do at Next Level of Care    Disposition:  SNF Rehab at [address] [place] [address]: Diabetic    Recommended activity: Up with PT    Wound Care :  Venelex ointment BID          Information obtained by :  I understand that if any problems occur once I am at home I am to contact my physician. I understand and acknowledge receipt of the instructions indicated above.                                                                                                                                            Physician's or R.N.'s Signature                                                                  Date/Time                                                                                                                                              Patient or Representative Signature                                                          Date/Time

## 2023-03-17 NOTE — PROGRESS NOTES
Spiritual Care Assessment/Progress Note  [facility]      NAME: Jorge Buckley      MRN: [id number]  AGE: 70 y.o. SEX: female  Mandaeism Affiliation: Hoahaoism   Language: English     3/17/2023     Total Time (in minutes): 15     Spiritual Assessment begun in [facility] through conversation with:         []Patient        [] Family    [] Friend(s)        Reason for Consult: Initial/Spiritual assessment, patient floor     Spiritual beliefs: (Please include comment if needed)     [] Identifies with a ana rosa tradition:         [] Supported by a ana rosa community:            [] Claims no spiritual orientation:           [] Seeking spiritual identity:                [] Adheres to an individual form of spirituality:           [x] Not able to assess:                           Identified resources for coping:      [] Prayer                               [] Music                  [] Guided Imagery     [] Family/friends                 [] Pet visits     [] Devotional reading                         [x] Unknown     [] Other:                                                Interventions offered during this visit: (See comments for more details)    Patient Interventions: Initial visit           Plan of Care:     [x] Support spiritual and/or cultural needs    [] Support AMD and/or advance care planning process      [] Support grieving process   [] Coordinate Rites and/or Rituals    [] Coordination with community clergy   [] No spiritual needs identified at this time   [] Detailed Plan of Care below (See Comments)  [] Make referral to Music Therapy  [] Make referral to Pet Therapy     [] Make referral to Addiction services  [] Make referral to [facility]  [] Make referral to Spiritual Care Partner  [] No future visits requested        [x] Contact Spiritual Care for further referrals     Comments:  Reviewed chart prior to visit on [facility] unit for spiritual assessment. No family/friends present. Patient appeared to be resting comfortably and did not stir when  called her name. Unable to assess for spiritual needs or concerns at this time. Left pastoral care note for patient.    available upon referral by staff or by patient/family request.       MAJOR Zapata, [facility], Staff   MELISSA [facility] Paging Service  [phone])

## 2023-03-18 ENCOUNTER — HOSPITAL ENCOUNTER (EMERGENCY)
Age: 78
Discharge: HOME | End: 2023-03-18
Attending: EMERGENCY MEDICINE
Payer: MEDICARE

## 2023-03-18 VITALS
HEART RATE: 76 BPM | TEMPERATURE: 98.1 F | HEIGHT: 63 IN | OXYGEN SATURATION: 95 % | BODY MASS INDEX: 34.77 KG/M2 | RESPIRATION RATE: 23 BRPM | DIASTOLIC BLOOD PRESSURE: 98 MMHG | SYSTOLIC BLOOD PRESSURE: 169 MMHG | WEIGHT: 196.21 LBS

## 2023-03-18 DIAGNOSIS — R73.9 HYPERGLYCEMIA: Primary | ICD-10-CM

## 2023-03-18 LAB
ALBUMIN SERPL-MCNC: 2.8 G/DL (ref 3.5–5)
ALBUMIN/GLOB SERPL: 0.8 (ref 1.1–2.2)
ALP SERPL-CCNC: 201 U/L (ref 45–117)
ALT SERPL-CCNC: 110 U/L (ref 12–78)
ANION GAP SERPL CALC-SCNC: 5 MMOL/L (ref 5–15)
AST SERPL-CCNC: 75 U/L (ref 15–37)
BACTERIA SPEC CULT: NORMAL
BACTERIA SPEC CULT: NORMAL
BASOPHILS # BLD: 0 K/UL (ref 0–0.1)
BASOPHILS NFR BLD: 0 % (ref 0–1)
BILIRUB SERPL-MCNC: 0.5 MG/DL (ref 0.2–1)
BUN SERPL-MCNC: 20 MG/DL (ref 6–20)
BUN/CREAT SERPL: 24 (ref 12–20)
CALCIUM SERPL-MCNC: 8.4 MG/DL (ref 8.5–10.1)
CHLORIDE SERPL-SCNC: 105 MMOL/L (ref 97–108)
CO2 SERPL-SCNC: 28 MMOL/L (ref 21–32)
CREAT SERPL-MCNC: 0.84 MG/DL (ref 0.55–1.02)
DIFFERENTIAL METHOD BLD: ABNORMAL
EOSINOPHIL # BLD: 0.1 K/UL (ref 0–0.4)
EOSINOPHIL NFR BLD: 1 % (ref 0–7)
ERYTHROCYTE [DISTWIDTH] IN BLOOD BY AUTOMATED COUNT: 13.9 % (ref 11.5–14.5)
GLOBULIN SER CALC-MCNC: 3.3 G/DL (ref 2–4)
GLUCOSE BLD STRIP.AUTO-MCNC: 304 MG/DL (ref 65–117)
GLUCOSE BLD STRIP.AUTO-MCNC: 361 MG/DL (ref 65–117)
GLUCOSE SERPL-MCNC: 334 MG/DL (ref 65–100)
HCT VFR BLD AUTO: 39.1 % (ref 35–47)
HGB BLD-MCNC: 13.7 G/DL (ref 11.5–16)
IMM GRANULOCYTES # BLD AUTO: 0.1 K/UL (ref 0–0.04)
IMM GRANULOCYTES NFR BLD AUTO: 1 % (ref 0–0.5)
LYMPHOCYTES # BLD: 0.4 K/UL (ref 0.8–3.5)
LYMPHOCYTES NFR BLD: 6 % (ref 12–49)
MCH RBC QN AUTO: 35.4 PG (ref 26–34)
MCHC RBC AUTO-ENTMCNC: 35 G/DL (ref 30–36.5)
MCV RBC AUTO: 101 FL (ref 80–99)
MONOCYTES # BLD: 0.4 K/UL (ref 0–1)
MONOCYTES NFR BLD: 6 % (ref 5–13)
NEUTS SEG # BLD: 6.3 K/UL (ref 1.8–8)
NEUTS SEG NFR BLD: 86 % (ref 32–75)
NRBC # BLD: 0 K/UL (ref 0–0.01)
NRBC BLD-RTO: 0 PER 100 WBC
PLATELET # BLD AUTO: 157 K/UL (ref 150–400)
PMV BLD AUTO: 9.8 FL (ref 8.9–12.9)
POTASSIUM SERPL-SCNC: 3.8 MMOL/L (ref 3.5–5.1)
PROT SERPL-MCNC: 6.1 G/DL (ref 6.4–8.2)
RBC # BLD AUTO: 3.87 M/UL (ref 3.8–5.2)
RBC MORPH BLD: ABNORMAL
RBC MORPH BLD: ABNORMAL
SERVICE CMNT-IMP: ABNORMAL
SERVICE CMNT-IMP: ABNORMAL
SERVICE CMNT-IMP: NORMAL
SERVICE CMNT-IMP: NORMAL
SODIUM SERPL-SCNC: 138 MMOL/L (ref 136–145)
WBC # BLD AUTO: 7.3 K/UL (ref 3.6–11)

## 2023-03-18 PROCEDURE — 82962 GLUCOSE BLOOD TEST: CPT

## 2023-03-18 PROCEDURE — 99284 EMERGENCY DEPT VISIT MOD MDM: CPT

## 2023-03-18 PROCEDURE — 80053 COMPREHEN METABOLIC PANEL: CPT

## 2023-03-18 PROCEDURE — 74011250636 HC RX REV CODE- 250/636: Performed by: EMERGENCY MEDICINE

## 2023-03-18 PROCEDURE — 85025 COMPLETE CBC W/AUTO DIFF WBC: CPT

## 2023-03-18 PROCEDURE — 36415 COLL VENOUS BLD VENIPUNCTURE: CPT

## 2023-03-18 PROCEDURE — 96360 HYDRATION IV INFUSION INIT: CPT

## 2023-03-18 RX ADMIN — SODIUM CHLORIDE 1000 ML: 9 INJECTION, SOLUTION INTRAVENOUS at 16:33

## 2023-03-18 NOTE — DISCHARGE INSTRUCTIONS
You were evaluated in the emergency department for high glucose levels and concern about the level of care you are receiving at [address]  [facility]. Your examination was reassuring as was your work-up including blood work. Your blood glucose level of 360 is better now after IV fluids. It will be important for you to follow-up with your primary care physician in 1-2 days. Case management will also be reaching out to you about resources at home. If you develop worsening symptoms such as fevers, chills, or sugar levels are too high or too low, please return to the emergency department immediately.

## 2023-03-18 NOTE — ED PROVIDER NOTES
[facility] EMERGENCY DEPT  EMERGENCY DEPARTMENT ENCOUNTER       Pt Name: Enrique Weiss  MRN: [id number]  [place] 1945  Date of evaluation: 3/18/2023  Provider: Jordon Clemente MD   PCP: Kelley Grey MD  Note Started: 3:56 PM 3/18/23     CHIEF COMPLAINT       Chief Complaint   Patient presents with    High Blood Sugar     Per EMS, pt had a blood sugar of 323. At triage uvt=026. Pt states [address] was not following discharge protocols, pt's son became concerned, and called EMS to have pt evaluated. Pt is alert and oriented x 4. HISTORY OF PRESENT ILLNESS: 1 or more elements      History From: Patient, family, History limited by: No limitations     Enrique Weiss is a 70 y.o. female who presents with chief complaint of elevated blood glucose levels. Patient had recent admission, discharged yesterday to [address] [place] [address]. She was admitted for hypoglycemia secondary to UTI and sepsis. Her insulin regimen was adjusted by her PCP, Dr. Amanda Garner, and patient was discharged to skilled nurse facility. Patient and family feel like [facility] have not been following the insulin protocol that she was discharged with and they were concerned that she was not receiving proper care so family called 911 to bring patient to the emergency department. She complains of feeling lightheaded, fatigued, intermittently confused. Nursing Notes were all reviewed and agreed with or any disagreements were addressed in the HPI. REVIEW OF SYSTEMS        Positives and Pertinent negatives as per HPI.     PAST HISTORY     Past Medical History:  Past Medical History:   Diagnosis Date    Abnormal LFTs 08/24/2017    FATTY LIVER    Arthritis     OSTEO    Avascular necrosis of hip ([facility] 08/24/2017    BILAT HIPS    Breast CA ([facility] 1989, 2001    BILATERAL; SURGERY, CHEMO    Chronic pain     CKD (chronic kidney disease), stage II 8/24/2017    Coagulation disorder ([facility] [facility] 1984    ITP  (DR SAGE LEEANN) - PLATELETS DROPPED TO 5K    Depression     Diabetes ([facility] [facility]     TYPE 2; NIDDM    DJD (degenerative joint disease), multiple sites 2017    GI bleed     HEMORRHOIDS    Hyperlipemia     Hypertension with renal disease 2017    IBS (irritable bowel syndrome) 2017    Ill-defined condition     PNEUMONIA X2 - HOSPITALIZED IN     Interstitial lung disease ([facility] 2019    Liver disease     FATTY LIVER    Myalgia 2017    On statin therapy 2017    Overactive bladder 2017    Pneumonia 2015    HOSPITALIZED 3 WEEKS.     Polymyalgia rheumatica ([facility] [facility] 2017    Prophylactic antibiotic 2017    Reflex abnormality     acid reflex    Rosacea        Past Surgical History:  Past Surgical History:   Procedure Laterality Date    HX APPENDECTOMY  1950    HX BREAST BIOPSY Bilateral     HX CATARACT REMOVAL Bilateral     HX COLONOSCOPY      HX ENDOSCOPY      HX GI      COLONOSCOPY    HX HEENT      WISDOM TEETH    HX HYSTERECTOMY  2000S    HX MASTECTOMY Right     HX MASTECTOMY Left     HX ORTHOPAEDIC  2008    LUMBAR FUSION    HX ORTHOPAEDIC  2018    RE-DO LUMBAR FUSION, AND ADDED THORACIC FUSION    HX ORTHOPAEDIC  2019    neckectomy    HX TUBAL LIGATION  1981    HX UROLOGICAL  2000s    BLADDER SLING    AK ARTHRP ACETBLR/PROX FEM PROSTC AGRFT/ALGRFT Left 2016    ANTERIOR APPROACH, DR Linsey Avelar; (POSTOP: STANDS ONE INCH TALLER ON LEFT FOOT)    AK ARTHRP ACETBLR/PROX FEM PROSTC AGRFT/ALGRFT Right 2016    ANTERIOR APPROACH (DR Linsey Avelar)    AK UNLISTED PROCEDURE BREAST  1993,     RECONSTRUCTION X2       Family History:  Family History   Problem Relation Age of Onset    Heart Disease Mother          5    Kidney Disease Mother     Lung Disease Mother         COPD    Diabetes Brother     Pacemaker Brother     Kidney Disease Brother     Hypertension Brother     Anesth Problems Neg Hx        Social History:  Social History     Tobacco Use    Smoking status: Never    Smokeless tobacco: Never   Vaping Use    Vaping Use: Never used   Substance Use Topics    Alcohol use: No    Drug use: No       Allergies: Allergies   Allergen Reactions    Metformin Diarrhea    Statins-Hmg-Coa Reductase Inhibitors Myalgia     Myalgia with  multiple statins  Takes pravachol     Codeine Rash     Rash on thighs    Darvon [Propoxyphene] Other (comments)     \"I see worms\"    Pcn [Penicillins] Rash    Sulfa (Sulfonamide Antibiotics) Rash       CURRENT MEDICATIONS      Discharge Medication List as of 3/18/2023  6:49 PM        CONTINUE these medications which have NOT CHANGED    Details   potassium chloride (K-DUR, KLOR-CON M20) 20 mEq tablet Take 1 Tablet by mouth two (2) times a day., Normal, Disp-270 Tablet, R-3Dose increased per lab 7-      carvediloL (COREG) 12.5 mg tablet Take 1 Tablet by mouth two (2) times a day., Normal, Disp-60 Tablet, R-PRN      insulin glargine (LANTUS) 100 unit/mL injection 20 Units by SubCUTAneous route daily. In the morning, Normal, Disp-1 mL, R-0      levoFLOXacin (LEVAQUIN) 500 mg tablet Take 1 Tablet by mouth daily. , Normal, Disp-5 Tablet, R-0      buPROPion SR (WELLBUTRIN SR) 150 mg SR tablet Take 150 mg by mouth two (2) times a day., Historical Med      omega 3-dha-epa-fish oil (Fish OiL) 100-160-1,000 mg cap Take 1 Caplet by mouth daily. , Historical Med      montelukast (SINGULAIR) 10 mg tablet Take 10 mg by mouth nightly., Historical Med      sertraline (ZOLOFT) 100 mg tablet Take 100 mg by mouth two (2) times a day., Historical Med      hydroCHLOROthiazide (HYDRODIURIL) 25 mg tablet Take 25 mg by mouth daily. , Historical Med      predniSONE (DELTASONE) 10 mg tablet TAKE 4 TABLETS DAILY, Normal, Disp-360 Tablet, R-3      LORazepam (ATIVAN) 1 mg tablet TAKE 1 TABLET BY MOUTH EVERY EIGHT (8) HOURS AS NEEDED FOR ANXIETY, Normal, Disp-90 Tablet, R-0      gabapentin (NEURONTIN) 100 mg capsule Take 1 Capsule by mouth three (3) times daily.  Max Daily Amount: 300 mg., Normal, Disp-90 Capsule, R-5      rosuvastatin (CRESTOR) 20 mg tablet Take 1 Tablet by mouth nightly., Normal, Disp-90 Tablet, R-3      clotrimazole-betamethasone (LOTRISONE) topical cream Apply  to affected area two (2) times a day., Normal, Disp-45 g, R-1      primidone (Mysoline) 50 mg tablet Take 1 Tablet by mouth two (2) times a day., Normal, Disp-120 Tablet, R-3      collagenase (SANTYL) 250 unit/gram ointment Apply 1 Each to affected area daily. , Historical Med      clonazePAM (KlonoPIN) 1 mg tablet Take 1 mg by mouth daily. , Historical Med      Dulera 200-5 mcg/actuation HFA inhaler Take 2 Puffs by inhalation two (2) times a day., Historical Med, PEGGY      Ofev 150 mg cap Take 150 mg by mouth two (2) times a day., Historical Med, PEGGY      benzonatate (TESSALON) 200 mg capsule TAKE 1 CAPSULE BY MOUTH THREE TIMES DAILY AS NEEDED FOR COUGH - SWALLOW CAPSULE WHOLE (DO NOT CRUSH), Normal, Disp-60 Capsule, R-1      albuterol (ProAir HFA) 90 mcg/actuation inhaler Take 1 Puff by inhalation every four (4) hours as needed for Wheezing or Shortness of Breath., Normal, Disp-18 g, R-5      pantoprazole (PROTONIX) 40 mg tablet Take 40 mg by mouth daily. , Historical Med             SCREENINGS               No data recorded         PHYSICAL EXAM      ED Triage Vitals [03/18/23 1546]   ED Encounter Vitals Group      BP (!) 161/94      Pulse (Heart Rate) 79      Resp Rate 24      Temp 98.1 °F (36.7 °C)      Temp src       O2 Sat (%) 95 %      Weight 196 lb 3.4 oz      Height 5' 3\"        Physical Exam  Vitals and nursing note reviewed. Constitutional:       General: She is not in acute distress. Appearance: Normal appearance. She is not ill-appearing. Cardiovascular:      Rate and Rhythm: Normal rate and regular rhythm. Heart sounds: No murmur heard. Pulmonary:      Effort: Pulmonary effort is normal. No respiratory distress. Breath sounds: Normal breath sounds.    Abdominal:      General: Abdomen is flat. There is no distension. Tenderness: There is no abdominal tenderness. There is no guarding or rebound. Neurological:      Mental Status: She is alert. DIAGNOSTIC RESULTS   LABS:     Recent Results (from the past 12 hour(s))   GLUCOSE, POC    Collection Time: 03/18/23  3:39 PM   Result Value Ref Range    Glucose (POC) 361 (H) 65 - 117 mg/dL    Performed by Inder Khan \"Cesar\" (TRV RN)    CBC WITH AUTOMATED DIFF    Collection Time: 03/18/23  4:32 PM   Result Value Ref Range    WBC 7.3 3.6 - 11.0 K/uL    RBC 3.87 3.80 - 5.20 M/uL    HGB 13.7 11.5 - 16.0 g/dL    HCT 39.1 35.0 - 47.0 %    .0 (H) 80.0 - 99.0 FL    MCH 35.4 (H) 26.0 - 34.0 PG    MCHC 35.0 30.0 - 36.5 g/dL    RDW 13.9 11.5 - 14.5 %    PLATELET 133 742 - 612 K/uL    MPV 9.8 8.9 - 12.9 FL    NRBC 0.0 0  WBC    ABSOLUTE NRBC 0.00 0.00 - 0.01 K/uL    NEUTROPHILS 86 (H) 32 - 75 %    LYMPHOCYTES 6 (L) 12 - 49 %    MONOCYTES 6 5 - 13 %    EOSINOPHILS 1 0 - 7 %    BASOPHILS 0 0 - 1 %    IMMATURE GRANULOCYTES 1 (H) 0.0 - 0.5 %    ABS. NEUTROPHILS 6.3 1.8 - 8.0 K/UL    ABS. LYMPHOCYTES 0.4 (L) 0.8 - 3.5 K/UL    ABS. MONOCYTES 0.4 0.0 - 1.0 K/UL    ABS. EOSINOPHILS 0.1 0.0 - 0.4 K/UL    ABS. BASOPHILS 0.0 0.0 - 0.1 K/UL    ABS. IMM. GRANS. 0.1 (H) 0.00 - 0.04 K/UL    DF SMEAR SCANNED      RBC COMMENTS ANISOCYTOSIS  1+        RBC COMMENTS MACROCYTOSIS  1+       METABOLIC PANEL, COMPREHENSIVE    Collection Time: 03/18/23  4:32 PM   Result Value Ref Range    Sodium 138 136 - 145 mmol/L    Potassium 3.8 3.5 - 5.1 mmol/L    Chloride 105 97 - 108 mmol/L    CO2 28 21 - 32 mmol/L    Anion gap 5 5 - 15 mmol/L    Glucose 334 (H) 65 - 100 mg/dL    BUN 20 6 - 20 MG/DL    Creatinine 0.84 0.55 - 1.02 MG/DL    BUN/Creatinine ratio 24 (H) 12 - 20      eGFR >60 >60 ml/min/1.73m2    Calcium 8.4 (L) 8.5 - 10.1 MG/DL    Bilirubin, total 0.5 0.2 - 1.0 MG/DL    ALT (SGPT) 110 (H) 12 - 78 U/L    AST (SGOT) 75 (H) 15 - 37 U/L    Alk.  phosphatase 201 (H) 45 - 117 U/L    Protein, total 6.1 (L) 6.4 - 8.2 g/dL    Albumin 2.8 (L) 3.5 - 5.0 g/dL    Globulin 3.3 2.0 - 4.0 g/dL    A-G Ratio 0.8 (L) 1.1 - 2.2     GLUCOSE, POC    Collection Time: 03/18/23  5:41 PM   Result Value Ref Range    Glucose (POC) 304 (H) 65 - 117 mg/dL    Performed by Serenity Emerson \"Cesar\" (TRMUNA RN)         EKG: If performed, independent interpretation documented below in the MDM section     RADIOLOGY:  Non-plain film images such as CT, Ultrasound and MRI are read by the radiologist. Plain radiographic images are visualized and preliminarily interpreted by the ED Provider with the findings documented in the MDM section. Interpretation per the Radiologist below, if available at the time of this note:     No results found. PROCEDURES   Unless otherwise noted below, none  Procedures     CRITICAL CARE TIME   0    EMERGENCY DEPARTMENT COURSE and DIFFERENTIAL DIAGNOSIS/MDM   Vitals:    Vitals:    03/18/23 1716 03/18/23 1731 03/18/23 1746 03/18/23 1801   BP: (!) 167/96 (!) 162/100 (!) 167/98 (!) 169/98   Pulse: 75 75 75 76   Resp: 20 20 16 23   Temp:       SpO2: 95% 94% 94% 95%   Weight:       Height:            Patient was given the following medications:  Medications   sodium chloride 0.9 % bolus infusion 1,000 mL (0 mL IntraVENous IV Completed 3/18/23 1733)       Medical Decision Making  Amount and/or Complexity of Data Reviewed  Independent Historian: caregiver     Details: son  External Data Reviewed: notes. Details: discharge summary records from yesterday 3/17/23  Labs: ordered. Decision-making details documented in ED Course. Risk  Prescription drug management. Decision regarding hospitalization.       79-year-old female with recent hospitalization for hypoglycemia secondary to urosepsis who was discharged yesterday to skilled nursing facility who presents to the emergency department by EMS at family insistence because they are concerned that she is not receiving proper care at the skilled nursing facility. They are worried that she is not receiving the proper insulin dosage that was adjusted during her hospitalization. Patient mildly hyperglycemic with glucose level 360 upon arrival.  She does endorse feeling lightheaded and fatigued. Otherwise denies fevers or any other illness. I will assess with CBC, CMP, treat with IV fluids, possibly IV insulin and reassess. Patient feels mildly improved after administration of IV fluids. Glucose now down to 304, not unchanged from discharge glucose yesterday 290. Discussed with patient and son at bedside, laboratory evaluation otherwise unremarkable, no sign of DKA. No need for readmission. They are not happy with the care that they have received at [facility] and [facility] and they are requesting discharge home. Son states that he will take his mother home to live with him. They are requesting case management which we do not have at this time over the weekend, I have taken down his information I will pass along to case management for them to call when able as he will likely require home health and further resources at home. Given strict return ED precautions and all questions answered. ED Course as of 03/18/23 2239   Sat Mar 18, 2023   1528 Personally reviewed discharge summary records from yesterday 3/17/2023. Patient was admitted for hypoglycemia with blood glucose level 38, responded to D50. She was found to have elevated lactic acid and felt to be septic, this is due to UTI, started on Rocephin. She initially required D10 drip. Patient was discharged to [address] [place] [address] and rehabilitation yesterday. [AK]   1732 Mild transaminitis consistent with baseline [AK]      ED Course User Index  [AK] Hattie Harry MD         Cardiac Monitoring:   The cardiac monitor revealed the following rhythm as interpreted by me: Normal Sinus Rhythm, rate 75 bpm  The cardiac monitor was ordered secondary to the patient's reported complaint of hyperglycemia and to monitor the patient for dysrhythmia. Rocky Hernandez MD      FINAL IMPRESSION     1. Hyperglycemia          DISPOSITION/PLAN     Discharge Note:  The patient has been re-evaluated and is ready for discharge. Reviewed available results with patient. Counseled patient on diagnosis and care plan. Patient has expressed understanding, and all questions have been answered. Patient agrees with plan and agrees to follow up as recommended, or to return to the ED if their symptoms worsen. Discharge instructions have been provided and explained to the patient, along with reasons to return to the ED. CLINICAL IMPRESSION    1. Hyperglycemia         DISPOSITION  discharge     PATIENT REFERRED TO:  Follow-up Information       Follow up With Specialties Details Why Contact Info    Johny Estrella MD Internal Medicine Physician Schedule an appointment as soon as possible for a visit   [facility].  [phone]      hospitals EMERGENCY DEPT Emergency Medicine Go to  As needed, If symptoms worsen [address] [place] [address]  [phone]              DISCHARGE MEDICATIONS:  Discharge Medication List as of 3/18/2023  6:49 PM            DISCONTINUED MEDICATIONS:  Discharge Medication List as of 3/18/2023  6:49 PM          I am the Primary Clinician of Record. Rocky Hernandez MD (electronically signed)    (Please note that parts of this dictation were completed with voice recognition software. Quite often unanticipated grammatical, syntax, homophones, and other interpretive errors are inadvertently transcribed by the computer software. Please disregards these errors.  Please excuse any errors that have escaped final proofreading.)

## 2023-03-18 NOTE — ED TRIAGE NOTES
Per EMS, pt had a blood sugar of 323. At triage rqv=262. Pt states [address] was not following discharge protocols, pt's son became concerned, and called EMS to have pt evaluated. Pt is alert and oriented x 4.

## 2023-03-19 DIAGNOSIS — F41.9 ANXIETY: Primary | ICD-10-CM

## 2023-03-20 ENCOUNTER — PATIENT OUTREACH (OUTPATIENT)
Dept: CASE MANAGEMENT | Age: 78
End: 2023-03-20

## 2023-03-20 ENCOUNTER — TELEPHONE (OUTPATIENT)
Dept: INTERNAL MEDICINE CLINIC | Age: 78
End: 2023-03-20

## 2023-03-20 RX ORDER — CLONAZEPAM 1 MG/1
TABLET ORAL
Qty: 60 TABLET | Refills: 0 | Status: SHIPPED | OUTPATIENT
Start: 2023-03-20 | End: 2023-03-21 | Stop reason: SDUPTHER

## 2023-03-20 NOTE — TELEPHONE ENCOUNTER
At 1 [employer] called regarding the patient. States that they are requesting an  order to resume home health skilled nursing, pt and ot. States they are attempting to keep the patient from going back into the hospital if at all possible. States the patient was put into a skilled nursing facility previously but was pulled due to the care she was receiving there. Please advise.

## 2023-03-20 NOTE — TELEPHONE ENCOUNTER
RX refill request from the patient/pharmacy. Patient last seen 01- with labs, and next appt. scheduled for 04-  Requested Prescriptions     Pending Prescriptions Disp Refills    clonazePAM (KlonoPIN) 1 mg tablet [Pharmacy Med Name: CLONAZEPAM 1MG] 60 Tablet 0     Sig: TAKE 1 TABLET BY MOUTH TWO (2) TIMES A DAY. MAX DAILY AMOUNT: 2 MG.     Morales Saleem

## 2023-03-20 NOTE — TELEPHONE ENCOUNTER
Returned call and given verbal to resume skilled nursing, PT and OT. Has a ELVIS appointment scheduled 3- with Geronimo Weston.

## 2023-03-20 NOTE — PROGRESS NOTES
Care Transitions Initial Call    Call within 2 business days of discharge: Yes     Patient Current Location: [place]    Patient: Gilda Drake Patient : 1945 MRN: [id number]    Last Discharge  Street       Date Complaint Diagnosis Description Type Department Provider    3/18/23 High Blood Sugar Hyperglycemia ED (DISCHARGE) ENMANUEL Shepherd MD            Was this an external facility discharge? No Discharge Facility: [facility]    Challenges to be reviewed by the provider   Additional needs identified to be addressed with provider: yes  home health care- PT,OT and SN-need to orders to resume             Method of communication with provider : chart routing, phone, none    Advance Care Planning:   Does patient have an Advance Directive: yes, reviewed and current. Inpatient Readmission Risk score: Unplanned Readmit Risk Score: 9.5    Was this a readmission? no   Patient stated reason for the admission: Sepsis    Patients top risk factors for readmission: medical condition-Severe sepsis    Interventions to address risk factors: Scheduled appointment with PCP-3/21/23 and Obtained and reviewed discharge summary and/or continuity of care documents    Care Transition Nurse (CTN) contacted the patient by telephone to perform post hospital discharge assessment. Verified name and  with family as identifiers. Provided introduction to self, and explanation of the CTN role. CTN reviewed discharge instructions, medical action plan and red flags with family who verbalized understanding. Were discharge instructions available to patient? yes. Reviewed appropriate site of care based on symptoms and resources available to patient including: PCP. Family given an opportunity to ask questions and does not have any further questions or concerns at this time. The family agrees to contact the PCP office for questions related to their healthcare.      Medication reconciliation was not performed with family, who verbalizes understanding of administration of home medications. Son states prescriptions given for new medications in ED(per admission discharge instructions) and medication changes were reviewed by ED nurse. Advised obtaining a 90-day supply of all daily and as-needed medications. Referral to Pharm D needed: no     Home Health/Outpatient orders at discharge:  pending orders  [address]: At [facility]  Date of initial visit: TBD    [facility] ordered at discharge: None  [address] received: n/a    Patient has wheelchair,RW,hospital bed and BS    Was patient discharged with a pulse oximeter? no    Discussed follow-up appointments. If no appointment was previously scheduled, appointment scheduling offered:  sooner appointment obtained . Is follow up appointment scheduled within 7 days of discharge? yes. [facility] follow up appointment(s):   Future Appointments   Date Time Provider Gwendolyn Loving   3/21/2023 10:00 AM Mary Ellen Donaldson NP PCAM BS AMB   4/25/2023  9:30 AM MD SULLY Gillespie BS AMB   4/27/2023 11:30 AM Frederick De Guzman MD RDE [address] BS AMB   6/21/2023  1:30 PM DEXA SCAN PCA BS AMB   7/7/2023 10:00 AM Stephane Samano BS AMB   8/22/2023 11:30 AM Frederick Darnell NP NEU BS AMB     Non-[facility] follow up appointment(s): n/a    Plan for follow-up call in 3-5 days based on severity of symptoms and risk factors. Plan for next call: follow up appointment-3/21/23 and community resources-services resumed  CTN provided contact information for future needs. Goals Addressed                   This Visit's Progress     Prevent complications post hospitalization. Scheduled hospital follow up for Severe Sepsis with Coty Munoz NP 3/21/23 at 10:00 am. [facility],OT and possibly SN. At [facility] will require orders to resume services.  Family understands to call PCP with any questions or concerns.

## 2023-03-21 ENCOUNTER — OFFICE VISIT (OUTPATIENT)
Dept: INTERNAL MEDICINE CLINIC | Age: 78
End: 2023-03-21
Payer: MEDICARE

## 2023-03-21 VITALS
WEIGHT: 189 LBS | BODY MASS INDEX: 33.49 KG/M2 | HEIGHT: 63 IN | DIASTOLIC BLOOD PRESSURE: 74 MMHG | SYSTOLIC BLOOD PRESSURE: 112 MMHG | OXYGEN SATURATION: 91 % | HEART RATE: 89 BPM | TEMPERATURE: 98.9 F | RESPIRATION RATE: 18 BRPM

## 2023-03-21 DIAGNOSIS — N18.2 CONTROLLED TYPE 2 DIABETES MELLITUS WITH STAGE 2 CHRONIC KIDNEY DISEASE, WITH LONG-TERM CURRENT USE OF INSULIN (HCC): ICD-10-CM

## 2023-03-21 DIAGNOSIS — Z92.89 HISTORY OF RECENT HOSPITALIZATION: ICD-10-CM

## 2023-03-21 DIAGNOSIS — E78.2 MIXED HYPERLIPIDEMIA: ICD-10-CM

## 2023-03-21 DIAGNOSIS — Z78.9 UNABLE TO CARE FOR SELF: ICD-10-CM

## 2023-03-21 DIAGNOSIS — J84.9 INTERSTITIAL LUNG DISEASE (HCC): ICD-10-CM

## 2023-03-21 DIAGNOSIS — Z79.4 CONTROLLED TYPE 2 DIABETES MELLITUS WITH STAGE 2 CHRONIC KIDNEY DISEASE, WITH LONG-TERM CURRENT USE OF INSULIN (HCC): ICD-10-CM

## 2023-03-21 DIAGNOSIS — Z91.81 RISK FOR FALLS: ICD-10-CM

## 2023-03-21 DIAGNOSIS — F41.9 ANXIETY: ICD-10-CM

## 2023-03-21 DIAGNOSIS — E11.22 CONTROLLED TYPE 2 DIABETES MELLITUS WITH STAGE 2 CHRONIC KIDNEY DISEASE, WITH LONG-TERM CURRENT USE OF INSULIN (HCC): ICD-10-CM

## 2023-03-21 DIAGNOSIS — G62.9 NEUROPATHY: ICD-10-CM

## 2023-03-21 DIAGNOSIS — R53.1 WEAKNESS GENERALIZED: Primary | ICD-10-CM

## 2023-03-21 DIAGNOSIS — Z86.19 HISTORY OF SEPSIS: ICD-10-CM

## 2023-03-21 PROCEDURE — 1090F PRES/ABSN URINE INCON ASSESS: CPT | Performed by: NURSE PRACTITIONER

## 2023-03-21 PROCEDURE — G8427 DOCREV CUR MEDS BY ELIG CLIN: HCPCS | Performed by: NURSE PRACTITIONER

## 2023-03-21 PROCEDURE — 1123F ACP DISCUSS/DSCN MKR DOCD: CPT | Performed by: NURSE PRACTITIONER

## 2023-03-21 PROCEDURE — 1111F DSCHRG MED/CURRENT MED MERGE: CPT | Performed by: NURSE PRACTITIONER

## 2023-03-21 PROCEDURE — G8399 PT W/DXA RESULTS DOCUMENT: HCPCS | Performed by: NURSE PRACTITIONER

## 2023-03-21 PROCEDURE — 1101F PT FALLS ASSESS-DOCD LE1/YR: CPT | Performed by: NURSE PRACTITIONER

## 2023-03-21 PROCEDURE — 3051F HG A1C>EQUAL 7.0%<8.0%: CPT | Performed by: NURSE PRACTITIONER

## 2023-03-21 PROCEDURE — G9717 DOC PT DX DEP/BP F/U NT REQ: HCPCS | Performed by: NURSE PRACTITIONER

## 2023-03-21 PROCEDURE — 99214 OFFICE O/P EST MOD 30 MIN: CPT | Performed by: NURSE PRACTITIONER

## 2023-03-21 PROCEDURE — G8536 NO DOC ELDER MAL SCRN: HCPCS | Performed by: NURSE PRACTITIONER

## 2023-03-21 PROCEDURE — G8417 CALC BMI ABV UP PARAM F/U: HCPCS | Performed by: NURSE PRACTITIONER

## 2023-03-21 RX ORDER — LOPERAMIDE HYDROCHLORIDE 2 MG/1
2 CAPSULE ORAL
COMMUNITY
Start: 2023-01-17

## 2023-03-21 RX ORDER — BUPROPION HYDROCHLORIDE 150 MG/1
150 TABLET, EXTENDED RELEASE ORAL 2 TIMES DAILY
Qty: 60 TABLET | Refills: 0 | Status: SHIPPED | OUTPATIENT
Start: 2023-03-21

## 2023-03-21 RX ORDER — CLONAZEPAM 1 MG/1
TABLET ORAL
Qty: 60 TABLET | Refills: 0 | Status: SHIPPED | OUTPATIENT
Start: 2023-03-21

## 2023-03-21 NOTE — PROGRESS NOTES
Lazaro De La Vega is a 70 y.o. female     Chief Complaint   Patient presents with    Hospital Follow Up     Seen at [facility] 3/12/23-3/17/23 for severe sepsis       Visit Vitals  /74 (BP 1 Location: Left upper arm, BP Patient Position: Sitting, BP Cuff Size: Adult)   Pulse 89   Temp 98.9 °F (37.2 °C)   Resp 18   Ht 5' 3\" (1.6 m)   Wt 189 lb (85.7 kg)   SpO2 91%   BMI 33.48 kg/m²       Health Maintenance Due   Topic Date Due    COVID-19 Vaccine (1) Never done    Shingles Vaccine (1 of 2) Never done    Eye Exam Retinal or Dilated  10/26/2022    Medicare Yearly Exam  04/16/2023         1. \"Have you been to the ER, urgent care clinic since your last visit? Hospitalized since your last visit? \" Yes When: 3/12/23-3/17/23 Where: [facility] Reason for visit: Sepsis    2. \"Have you seen or consulted any other health care providers outside of the [address] [place] [address] since your last visit? \" No     3. For patients aged 39-70: Has the patient had a colonoscopy / FIT/ Cologuard? Yes - no Care Gap present      If the patient is female:    4. For patients aged 41-77: Has the patient had a mammogram within the past 2 years? Yes - no Care Gap present      5. For patients aged 21-65: Has the patient had a pap smear?  NA - based on age or sex

## 2023-03-21 NOTE — PROGRESS NOTES
Chief Complaint   Patient presents with    Hospital Follow Up     Seen at [facility] 3/12/23-3/17/23 for severe sepsis       SUBJECTIVE:    Bipin Fontanez is a 70 y.o. female who is here today for a follow up appointment after recent hospitalization for treatment of urosepsis at [facility] and was inpatient from 03/12/2023 to 03/17/2023. The patient states that upon discharge, she was sent to [facility] where she stayed overnight, and due to concerns by family regarding excessively high blood sugars, the patient was taken to the emergency room for further evaluation on 03/18/2023. At that time, she was found to have an initial glucose level of 361 mg/dL, and complained of being lightheaded, intermittently confused, and quite fatigued. She was treated with IV fluids, reassessed, and released home with son and daughter. Her daughter accompanies her today and is requesting guidance on further care and options to help care for her mother. The patient states that she is feeling \"very nervous,\" and has \"no energy. \"  Daughter states she checked her blood sugars this morning which was approximately \"160 (mg/dL). \"  The patient appears alert and oriented, but noticeably nervous today. Additionally, the patient continues to be managed for mixed hyperlipidemia, interstitial lung disease, and neuropathy. She is taking medications as prescribed, and denies any adverse side effects. Current Outpatient Medications   Medication Sig Dispense Refill    loperamide (IMODIUM) 2 mg capsule Take 2 mg by mouth. buPROPion SR (WELLBUTRIN SR) 150 mg SR tablet Take 1 Tablet by mouth two (2) times a day. 60 Tablet 0    clonazePAM (KlonoPIN) 1 mg tablet TAKE 1 TABLET BY MOUTH TWO (2) TIMES A DAY. MAX DAILY AMOUNT: 2 MG. 60 Tablet 0    potassium chloride (K-DUR, KLOR-CON M20) 20 mEq tablet Take 1 Tablet by mouth two (2) times a day.  270 Tablet 3    carvediloL (COREG) 12.5 mg tablet Take 1 Tablet by mouth two (2) times a day. 60 Tablet PRN    insulin glargine (LANTUS) 100 unit/mL injection 20 Units by SubCUTAneous route daily. In the morning 1 mL 0    levoFLOXacin (LEVAQUIN) 500 mg tablet Take 1 Tablet by mouth daily. 5 Tablet 0    omega 3-dha-epa-fish oil (Fish OiL) 100-160-1,000 mg cap Take 1 Caplet by mouth daily. montelukast (SINGULAIR) 10 mg tablet Take 10 mg by mouth nightly. sertraline (ZOLOFT) 100 mg tablet Take 100 mg by mouth two (2) times a day. predniSONE (DELTASONE) 10 mg tablet TAKE 4 TABLETS DAILY 360 Tablet 3    LORazepam (ATIVAN) 1 mg tablet TAKE 1 TABLET BY MOUTH EVERY EIGHT (8) HOURS AS NEEDED FOR ANXIETY 90 Tablet 0    gabapentin (NEURONTIN) 100 mg capsule Take 1 Capsule by mouth three (3) times daily. Max Daily Amount: 300 mg. 90 Capsule 5    rosuvastatin (CRESTOR) 20 mg tablet Take 1 Tablet by mouth nightly. 90 Tablet 3    clotrimazole-betamethasone (LOTRISONE) topical cream Apply  to affected area two (2) times a day. 45 g 1    primidone (Mysoline) 50 mg tablet Take 1 Tablet by mouth two (2) times a day. 120 Tablet 3    collagenase (SANTYL) 250 unit/gram ointment Apply 1 Each to affected area daily. Dulera 200-5 mcg/actuation HFA inhaler Take 2 Puffs by inhalation two (2) times a day. Ofev 150 mg cap Take 150 mg by mouth two (2) times a day. benzonatate (TESSALON) 200 mg capsule TAKE 1 CAPSULE BY MOUTH THREE TIMES DAILY AS NEEDED FOR COUGH - SWALLOW CAPSULE WHOLE (DO NOT CRUSH) 60 Capsule 1    albuterol (ProAir HFA) 90 mcg/actuation inhaler Take 1 Puff by inhalation every four (4) hours as needed for Wheezing or Shortness of Breath. 18 g 5    pantoprazole (PROTONIX) 40 mg tablet Take 40 mg by mouth daily. hydroCHLOROthiazide (HYDRODIURIL) 25 mg tablet Take 25 mg by mouth daily.  (Patient not taking: Reported on 3/21/2023)       Past Medical History:   Diagnosis Date    Abnormal LFTs 08/24/2017    FATTY LIVER    Arthritis     OSTEO    Avascular necrosis of hip ([address] 08/24/2017    BILAT HIPS    Breast CA ([facility] 1989, 2001    BILATERAL; SURGERY, CHEMO    Chronic pain     CKD (chronic kidney disease), stage II 8/24/2017    Coagulation disorder ([facility] [address] [address] 1984    ITP  (DR CHU BRADSHAW) - PLATELETS DROPPED TO 5K    Depression     Diabetes ([facility] [address] 2012    TYPE 2; NIDDM    DJD (degenerative joint disease), multiple sites 8/24/2017    GI bleed 2008    HEMORRHOIDS    Hyperlipemia     Hypertension with renal disease 8/24/2017    IBS (irritable bowel syndrome) 8/24/2017    Ill-defined condition 2015    PNEUMONIA X2 - HOSPITALIZED IN 2015    Interstitial lung disease ([facility] [address] [address] 04/01/2019    Liver disease     FATTY LIVER    Myalgia 8/24/2017    On statin therapy 8/24/2017    Overactive bladder 8/24/2017    Pneumonia 04/2015    HOSPITALIZED 3 WEEKS.     Polymyalgia rheumatica ([facility] [address] 8/24/2017    Prophylactic antibiotic 8/24/2017    Reflex abnormality     acid reflex    Rosacea      Past Surgical History:   Procedure Laterality Date    HX APPENDECTOMY  1950    HX BREAST BIOPSY Bilateral     HX CATARACT REMOVAL Bilateral     HX COLONOSCOPY      HX ENDOSCOPY      HX GI      COLONOSCOPY    HX HEENT      WISDOM TEETH    HX HYSTERECTOMY  2000S    HX MASTECTOMY Right 1989    HX MASTECTOMY Left 2001    HX ORTHOPAEDIC  2008    LUMBAR FUSION    HX ORTHOPAEDIC  2018    RE-DO LUMBAR FUSION, AND ADDED THORACIC FUSION    HX ORTHOPAEDIC  03/26/2019    neckectomy    HX TUBAL LIGATION  1981    HX UROLOGICAL  2000s    BLADDER SLING    MN ARTHRP ACETBLR/PROX FEM PROSTC AGRFT/ALGRFT Left 11/16/2016    ANTERIOR APPROACH, DR Gwendolyn De La Torre; (POSTOP: STANDS ONE INCH TALLER ON LEFT FOOT)    MN ARTHRP ACETBLR/PROX FEM PROSTC AGRFT/ALGRFT Right 12/2016    ANTERIOR APPROACH (DR Gwendolyn De La Torre)    MN UNLISTED PROCEDURE BREAST  1993, 2002    RECONSTRUCTION X2     Allergies   Allergen Reactions    Metformin Diarrhea    Statins-Hmg-Coa Reductase Inhibitors Myalgia     Myalgia with  multiple statins  Takes pravachol     Codeine Rash     Rash on thighs Darvon [Propoxyphene] Other (comments)     \"I see worms\"    Pcn [Penicillins] Rash    Sulfa (Sulfonamide Antibiotics) Rash       REVIEW OF SYSTEMS:                                        POSITIVE= bold text  Negative = regular text    General:                     fever, chills, sweats, generalized weakness, weight loss/gain,                                       loss of appetite   Eyes:                           blurred vision, eye pain, loss of vision, double vision  ENT:                            rhinorrhea, pharyngitis   Respiratory:               cough, sputum production, SOB, NORMAN, wheezing, pleuritic pain   Cardiology:                chest pain, palpitations, orthopnea, PND, edema, syncope   Gastrointestinal:       abdominal pain , N/V, diarrhea, dysphagia, constipation, bleeding   Genitourinary:           frequency, urgency, dysuria, hematuria, incontinence   Muskuloskeletal :      arthralgia, myalgia, back pain  Hematology:              easy bruising, nose or gum bleeding, lymphadenopathy   Dermatological:         rash, ulceration, pruritis, color change / jaundice  Endocrine:                 hot flashes or polydipsia   Neurological:             headache, dizziness, confusion, focal weakness, paresthesia,                                      Speech difficulties, memory loss, gait difficulty  Psychological:          Feelings of anxiety, depression, agitation        Social History     Socioeconomic History    Marital status:    Tobacco Use    Smoking status: Never    Smokeless tobacco: Never   Vaping Use    Vaping Use: Never used   Substance and Sexual Activity    Alcohol use: No    Drug use: No    Sexual activity: Never   Social History Narrative    Lives in [facility] alone. Has one son in [place] and one daughter in [facility]. . Used to work as an  for [employer] of education.        Social Determinants of Health     Financial Resource Strain: Low Risk     Difficulty of Paying Living Expenses: Not hard at all   Food Insecurity: No Food Insecurity    Worried About [address] in the Last Year: Never true    Ran Out of Food in the Last Year: Never true     Family History   Problem Relation Age of Onset    Heart Disease Mother          5    Kidney Disease Mother     Lung Disease Mother         COPD    Diabetes Brother     Pacemaker Brother     Kidney Disease Brother     Hypertension Brother     Anesth Problems Neg Hx        OBJECTIVE:     Visit Vitals  /74 (BP 1 Location: Left upper arm, BP Patient Position: Sitting, BP Cuff Size: Adult)   Pulse 89   Temp 98.9 °F (37.2 °C)   Resp 18   Ht 5' 3\" (1.6 m)   Wt 189 lb (85.7 kg)   SpO2 91%   BMI 33.48 kg/m²       Constitutional: She appears well nourished, of stated age, and dressed appropriately. Eyes: Sclera anicteric, PERRLA, EOMI  Neck: Supple without lymphadenopathy. Respiratory: Diffuse fine crackles to ascultation X5, normal inspiratory effort. .  Cardiovascular: Regular rate and rhythm, no rubs or gallops, PMI not displaced, No thrills. Gastrointestinal: Abdomen non-distended, soft, non-tender, bowel sounds normal and active X4. Neuro: Non-focal exam, A & O X 3.   Psychiatric: Moderately anxious affect and demeanor, but otherwise pleasant and cooperative. Patient's thought content and thought processing appear to be within normal limits. ASSESSMENT/PLAN:     ICD-10-CM ICD-9-CM    1. Weakness generalized  R53.1 780.79 REFERRAL TO SKILLED NURSING FACILITY      2. Anxiety  F41.9 300.00 buPROPion SR (WELLBUTRIN SR) 150 mg SR tablet      clonazePAM (KlonoPIN) 1 mg tablet      3. History of sepsis  Z86.19 V12.09 REFERRAL TO SKILLED NURSING FACILITY      4. Risk for falls  Z91.81 V15.88 REFERRAL TO SKILLED NURSING FACILITY      5. Unable to care for self  Z78.9 V49.89 REFERRAL TO SKILLED NURSING FACILITY      6.  Controlled type 2 diabetes mellitus with stage 2 chronic kidney disease, with long-term current use of insulin ([facility])  E11.22 250.40     N18.2 585.2     Z79.4 V58.67       7. Interstitial lung disease ([facility] [address] [address]  J84.9 515       8. Mixed hyperlipidemia  E78.2 272.2       9. Neuropathy  G62.9 355.9 REFERRAL TO SKILLED NURSING FACILITY      10. History of recent hospitalization  Z92.89 V13.9 REFERRAL TO SKILLED NURSING FACILITY        1: As discussed, patient will be referred to [address] [place] [address] for skilled nursing due to symptoms as listed above.  2: Patient to continue current medication regimen for management of type 2 diabetes. 3: Continue current antibiotics until completed due to history of urosepsis. 4: Continue all other medications as directed. 5: Follow-up with PCP as planned. Orders Placed This Encounter    REFERRAL TO SKILLED NURSING FACILITY    loperamide (IMODIUM) 2 mg capsule    buPROPion SR (WELLBUTRIN SR) 150 mg SR tablet    clonazePAM (KlonoPIN) 1 mg tablet         ATTENTION:   This medical record was transcribed using an electronic medical records system. Although proofread, it may and can contain electronic and spelling errors. Other human spelling and other errors may be present. Corrections may be executed at a later time. Please feel free to contact us for any clarifications as needed. Signed,  Maria E Molina.  Chere Lanes, MSN APRN FNP-BC

## 2023-03-23 ENCOUNTER — PATIENT OUTREACH (OUTPATIENT)
Dept: CASE MANAGEMENT | Age: 78
End: 2023-03-23

## 2023-03-23 NOTE — PROGRESS NOTES
Patient being admitted to [facility]. No transition of care outreach indicated due to patient discharge to hospice care.

## 2023-03-24 ENCOUNTER — TELEPHONE (OUTPATIENT)
Dept: NEUROLOGY | Age: 78
End: 2023-03-24

## 2023-03-24 ENCOUNTER — TELEPHONE (OUTPATIENT)
Dept: ENDOCRINOLOGY | Age: 78
End: 2023-03-24

## 2023-03-24 NOTE — TELEPHONE ENCOUNTER
Patient's daughter, Bernardo Arias, called to let us know that Pt is in hospice and needs to cancel her appt for 7/7 with Dr. Luis Manuel Andrade.     Bernardo Arias asked that if any further info is needed, to please call her brother (Pt's son), Brigitte Murillo. [phone]

## 2023-03-24 NOTE — TELEPHONE ENCOUNTER
Pt's daughter Teresa LVM at 2:38 PM stating her mother, Ms Tanvi Landry has been admitted to hospice and the hospice physician will be taking over care. [place] stated pt's appt has been cancelled with Dr Carie Ramos. [place] says her mother is still receiving 30 units of the long acting insulin (purple pen). Teresa states if Dr Carie Ramos has questions or wants to speak with Ms Tanvi Landry he can contact her at [phone].

## 2023-03-27 NOTE — PROGRESS NOTES
Physician Progress Note      PATIENT:               Usha Quiroz  CSN #:                  [id number]  :                       1945  ADMIT DATE:       3/12/2023 10:21 AM  DISCH DATE:        3/17/2023 3:50 PM  RESPONDING  PROVIDER #:        Jordi Sewell MD          QUERY TEXT:    Patient admitted with uti. Noted documentation of sepsis in dcs. In order to support the diagnosis of sepsis, please include additional clinical indicators in your documentation. Or please document if the diagnosis of sepsis has been ruled out after further study. The medical record reflects the following:  Risk Factors: Urinary tract infection with Escherichia coli and Proteus. Clinical Indicators: wbc 7, neut 81, lac 2.36, hr 110, rr 27  Treatment: Olinda Watson RN/CDI   Options provided:  -- Sepsis present as evidenced by, Please document evidence.   -- Sepsis was ruled out after study  -- Other - I will add my own diagnosis  -- Disagree - Not applicable / Not valid  -- Disagree - Clinically unable to determine / Unknown  -- Refer to Clinical Documentation Reviewer    PROVIDER RESPONSE TEXT:    See discharge summary    Query created by: Ankush Durham on 3/23/2023 3:06 PM      Electronically signed by:  Jordi Sewell MD 3/26/2023 9:30 PM

## 2023-04-03 PROBLEM — E11.65 HYPERGLYCEMIA DUE TO DIABETES MELLITUS (HCC): Status: RESOLVED | Noted: 2021-12-11 | Resolved: 2022-01-08

## 2023-04-11 PROBLEM — N39.0 UTI (URINARY TRACT INFECTION): Status: RESOLVED | Noted: 2023-03-12 | Resolved: 2023-04-11

## 2023-08-08 PROBLEM — M15.9 PRIMARY OSTEOARTHRITIS INVOLVING MULTIPLE JOINTS: Status: ACTIVE | Noted: 2018-01-12

## 2023-08-08 PROBLEM — G89.29 CHRONIC MIDLINE LOW BACK PAIN WITHOUT SCIATICA: Status: ACTIVE | Noted: 2021-07-06

## 2023-08-08 PROBLEM — I12.9 HYPERTENSION WITH RENAL DISEASE: Status: ACTIVE | Noted: 2017-08-24

## 2023-08-08 PROBLEM — A41.9 SEVERE SEPSIS (HCC): Status: RESOLVED | Noted: 2023-03-12 | Resolved: 2023-08-08

## 2023-08-08 PROBLEM — N18.2 CKD (CHRONIC KIDNEY DISEASE), STAGE II: Status: ACTIVE | Noted: 2017-08-24

## 2023-08-08 PROBLEM — F33.9 RECURRENT DEPRESSION (HCC): Status: ACTIVE | Noted: 2018-01-05

## 2023-08-08 PROBLEM — J84.9 INTERSTITIAL LUNG DISEASE (HCC): Status: ACTIVE | Noted: 2019-04-09

## 2023-08-08 PROBLEM — N32.81 OVERACTIVE BLADDER: Status: ACTIVE | Noted: 2017-08-24

## 2023-08-08 PROBLEM — M15.0 PRIMARY OSTEOARTHRITIS INVOLVING MULTIPLE JOINTS: Status: ACTIVE | Noted: 2018-01-12

## 2023-08-08 PROBLEM — K58.9 IBS (IRRITABLE BOWEL SYNDROME): Status: ACTIVE | Noted: 2017-08-24

## 2023-08-08 PROBLEM — M48.061 SPINAL STENOSIS, LUMBAR: Status: ACTIVE | Noted: 2018-07-16

## 2023-08-08 PROBLEM — R65.20 SEVERE SEPSIS (HCC): Status: RESOLVED | Noted: 2023-03-12 | Resolved: 2023-08-08

## 2023-08-08 PROBLEM — M54.50 CHRONIC MIDLINE LOW BACK PAIN WITHOUT SCIATICA: Status: ACTIVE | Noted: 2021-07-06

## 2023-08-08 PROBLEM — J96.11 CHRONIC HYPOXEMIC RESPIRATORY FAILURE (HCC): Status: ACTIVE | Noted: 2022-05-15

## 2023-08-08 PROBLEM — M48.02 CERVICAL STENOSIS OF SPINE: Status: ACTIVE | Noted: 2019-03-26

## 2023-08-08 PROBLEM — E55.9 VITAMIN D DEFICIENCY: Status: ACTIVE | Noted: 2017-10-02

## 2023-08-08 PROBLEM — R79.89 ABNORMAL LFTS: Status: ACTIVE | Noted: 2017-08-24

## 2023-08-08 PROBLEM — M35.3 POLYMYALGIA RHEUMATICA (HCC): Status: ACTIVE | Noted: 2017-08-24

## 2023-08-29 RX ORDER — CALCIUM CITRATE/VITAMIN D3 200MG-6.25
TABLET ORAL
Qty: 100 EACH | Refills: 2 | Status: SHIPPED | OUTPATIENT
Start: 2023-08-29

## 2023-08-29 NOTE — TELEPHONE ENCOUNTER
RX refill request from the patient/pharmacy. Patient last seen 01- with labs, and does not have a f/up appointment.   Requested Prescriptions     Pending Prescriptions Disp Refills    TRUE METRIX BLOOD GLUCOSE TEST strip [Pharmacy Med Name: TRUE METRIX TEST STRIPS] 100 each 2     Sig: USE ONCE DAILY AND RECORD RESULTS IN A NOTEBOOK ALSO RECORD LAST MEALTIME IN NOTEBOOK

## 2023-10-28 NOTE — PROGRESS NOTES
INTERNAL MEDICINE PROGRESS NOTE    NAME:  Jose Bravo   :   1945   MRN:   [id number]     Date/Time:  3/14/2023 6:16 AM  Subjective:   History:  Chart reviewed and patient seen and examined and D/W his nurse this AM and all events noted. She is followed by me for DM, HTN, DJD, PMR, ILD with chronic hypoxemic respiratory failure, and other medical problems. She has now been admitted with Hypoglycemia and sepsis due to a likely UTI. She was brought into the ER yesterday after being found down by her family. She was noted by EMS to be hypoglycemic with blood sugar 38. This improved to 160 after D10 given by EMS. She had recently been treated for urinary tract infection at an urgent care center so I do not know what she was taking as she does not know the name of the antibiotic either. She currently denies any urinary discomfort although some frequency is still present. She denies any back pain abdominal pain and has no nausea vomiting. She does have her chronic shortness of breath with dyspnea on exertion however she has not yet qualified for home oxygen. There are no other cardiac or respiratory complaints. There are no current GI complaints. Other than general weakness there are no neurologic complaints and there are no other complaints on complete review of systems.       Medications reviewed:  Current Facility-Administered Medications   Medication Dose Route Frequency    dextrose 5% - 0.45% NaCl with KCl 20 mEq/L infusion  50 mL/hr IntraVENous CONTINUOUS    miconazole (SECURA) 2 % extra thick cream   Topical BID    sodium chloride (NS) flush 5-10 mL  5-10 mL IntraVENous PRN    rosuvastatin (CRESTOR) tablet 20 mg  20 mg Oral QHS    primidone (MYSOLINE) tablet 50 mg  50 mg Oral BID    sertraline (ZOLOFT) tablet 100 mg  100 mg Oral BID    zinc sulfate (ZINCATE) 50 mg zinc (220 mg) capsule 1 Capsule  1 Capsule Oral DAILY    pantoprazole (PROTONIX) tablet 40 mg  40 mg Oral DAILY    oxyCODONE IR (ROXICODONE) tablet 5 mg  5 mg Oral Q4H PRN    montelukast (SINGULAIR) tablet 10 mg  10 mg Oral QHS    . PHARMACY TO SUBSTITUTE PER PROTOCOL (Reordered from: Ofev 150 mg cap)    Per Protocol    LORazepam (ATIVAN) tablet 1 mg  1 mg Oral Q8H PRN    gabapentin (NEURONTIN) capsule 100 mg  100 mg Oral TID    buPROPion ER (ZYBAN,BUPROBAN) tablet 150 mg  150 mg Oral BID    carvediloL (COREG) tablet 6.25 mg  6.25 mg Oral BID WITH MEALS    sodium chloride (NS) flush 5-40 mL  5-40 mL IntraVENous Q8H    sodium chloride (NS) flush 5-40 mL  5-40 mL IntraVENous PRN    acetaminophen (TYLENOL) tablet 650 mg  650 mg Oral Q6H PRN    Or    acetaminophen (TYLENOL) suppository 650 mg  650 mg Rectal Q6H PRN    polyethylene glycol (MIRALAX) packet 17 g  17 g Oral DAILY PRN    ondansetron (ZOFRAN ODT) tablet 4 mg  4 mg Oral Q8H PRN    Or    ondansetron (ZOFRAN) injection 4 mg  4 mg IntraVENous Q6H PRN    enoxaparin (LOVENOX) injection 40 mg  40 mg SubCUTAneous DAILY    predniSONE (DELTASONE) tablet 40 mg  40 mg Oral DAILY WITH BREAKFAST    cefTRIAXone (ROCEPHIN) 1 g in 0.9% sodium chloride (MBP/ADV) 50 mL MBP  1 g IntraVENous Q24H    balsam peru-castor oiL (VENELEX) ointment   Topical TID    glucose chewable tablet 16 g  4 Tablet Oral PRN    glucagon (GLUCAGEN) injection 1 mg  1 mg IntraMUSCular PRN    dextrose 10% infusion 0-250 mL  0-250 mL IntraVENous PRN        Objective:   Vitals:  Visit Vitals  /64   Pulse 72   Temp 98.9 °F (37.2 °C)   Resp 12   Ht 5' 3\" (1.6 m)   Wt 190 lb 11.2 oz (86.5 kg)   SpO2 95%   BMI 33.78 kg/m²      O2 Device: None (Room air) Temp (24hrs), Av.9 °F (37.2 °C), Min:98.2 °F (36.8 °C), Max:100.4 °F (38 °C)      Last 24hr Input/Output:    Intake/Output Summary (Last 24 hours) at 3/14/2023 0616  Last data filed at 3/14/2023 0544  Gross per 24 hour   Intake 942.5 ml   Output 1000 ml   Net -57.5 ml          PHYSICAL EXAM:  General:     Alert, cooperative, no distress, appears stated age.      Head: Normocephalic, without obvious abnormality, atraumatic. Eyes:    Conjunctivae/corneas clear. PERRLA  Nose:   Nares normal. No drainage or sinus tenderness. Throat:     Lips, mucosa, and tongue normal.  No Thrush  Neck:   Supple, symmetrical,  no adenopathy, thyroid: non tender     no carotid bruit and no JVD. Back:     Symmetric,  No CVA tenderness. Lungs:    Clear to auscultation bilaterally. No Wheezing or Rhonchi. No rales. Heart:    Regular rate and rhythm,  no murmur, rub or gallop. Abdomen:    Soft, non-tender. Not distended. Bowel sounds normal. No masses  Extremities:  Extremities normal, atraumatic, No cyanosis. No edema. No clubbing  Lymph nodes:  Cervical, supraclavicular normal.  Neurologic:  Normal strength, Alert and oriented X 3.    Skin:                No rash      Lab Data Reviewed:    Recent Results (from the past 24 hour(s))   GLUCOSE, POC    Collection Time: 03/13/23  6:58 AM   Result Value Ref Range    Glucose (POC) 148 (H) 65 - 117 mg/dL    Performed by Jessica Medeiros CON    GLUCOSE, POC    Collection Time: 03/13/23  9:54 AM   Result Value Ref Range    Glucose (POC) 162 (H) 65 - 117 mg/dL    Performed by Saravanan [employer] PCT    GLUCOSE, POC    Collection Time: 03/13/23 11:44 AM   Result Value Ref Range    Glucose (POC) 133 (H) 65 - 117 mg/dL    Performed by Saravanan [employer] PCT    GLUCOSE, POC    Collection Time: 03/13/23  2:14 PM   Result Value Ref Range    Glucose (POC) 165 (H) 65 - 117 mg/dL    Performed by [employer] PCT    GLUCOSE, POC    Collection Time: 03/13/23  9:26 PM   Result Value Ref Range    Glucose (POC) 396 (H) 65 - 117 mg/dL    Performed by [employer] PCT    GLUCOSE, POC    Collection Time: 03/13/23 11:14 PM   Result Value Ref Range    Glucose (POC) 321 (H) 65 - 117 mg/dL    Performed by [employer] PCT    METABOLIC PANEL, BASIC    Collection Time: 03/14/23  1:47 AM   Result Value Ref Range    Sodium 139 136 - 145 mmol/L    Potassium 4.3 3.5 - 5.1 mmol/L    Chloride 109 (H) 97 - 108 mmol/L    CO2 23 21 - 32 mmol/L    Anion gap 7 5 - 15 mmol/L    Glucose 265 (H) 65 - 100 mg/dL    BUN 15 6 - 20 MG/DL    Creatinine 0.82 0.55 - 1.02 MG/DL    BUN/Creatinine ratio 18 12 - 20      eGFR >60 >60 ml/min/1.73m2    Calcium 8.5 8.5 - 10.1 MG/DL   MAGNESIUM    Collection Time: 03/14/23  1:47 AM   Result Value Ref Range    Magnesium 2.0 1.6 - 2.4 mg/dL   GLUCOSE, POC    Collection Time: 03/14/23  3:08 AM   Result Value Ref Range    Glucose (POC) 215 (H) 65 - 117 mg/dL    Performed by Perla Singh PCT    GLUCOSE, POC    Collection Time: 03/14/23  6:04 AM   Result Value Ref Range    Glucose (POC) 166 (H) 65 - 117 mg/dL    Performed by Perla Singh PCT          Assessment/Plan:     Principal Problem:    Severe sepsis ([facility] (3/12/2023)    Active Problems:    Controlled type 2 diabetes mellitus with stage 2 chronic kidney disease, with long-term current use of insulin ([facility] (4/20/2015)      Hypertension with renal disease (8/24/2017)      Interstitial lung disease ([facility] (4/9/2019)      Chronic hypoxemic respiratory failure ([facility] (5/15/2022)      Hypoglycemia (3/12/2023)      UTI (urinary tract infection) (3/12/2023)     ___________________________________________________  PLAN:    1. Continue Rocephin for  infection/sepsis  2. Follow-up on urine culture (Proteus) and blood cultures (NG 2 days). Urine culture from 3/10 had greater than 2 organisms so not IDed is felt to be contaminant by the lab problem  3. D10 drip started and blood sugar has been followed every 2 hours with all blood sugars above 100 since midnight 3/12 so changed IV to D5 half-normal saline 3/13AM and blood sugar did go up to 396 last night so discontinue IV  4. Lantus obviously on hold; however may need to resume  5. Replete potassium which is 3.3 on 3/13 AM and now 4.3  6. Note lactate 2.36 on admission consistent with sepsis  7. PT and OT evaluation.   Patient says she was not seen by PT yesterday although I reviewed note written where they did actually do bed mobility with her  8. Interstitial lung disease but not requiring oxygen  9. Anxiety/depression continue home Zoloft and BuSpar  10. Hyperlipidemia continue Crestor    50 Minutes spent today in direct care of this high complexity patient with greater than 50% in counseling and coordination of care.     If need to contact me use hospital  [phone], DO NOT USE PERFECT SERVE    ___________________________________________________    Attending Physician: Elsi Pisano MD No

## 2024-02-16 ENCOUNTER — TELEPHONE (OUTPATIENT)
Facility: CLINIC | Age: 79
End: 2024-02-16

## 2024-02-16 ENCOUNTER — OFFICE VISIT (OUTPATIENT)
Facility: CLINIC | Age: 79
End: 2024-02-16

## 2024-02-16 VITALS
TEMPERATURE: 98.4 F | RESPIRATION RATE: 17 BRPM | DIASTOLIC BLOOD PRESSURE: 60 MMHG | HEART RATE: 78 BPM | BODY MASS INDEX: 33.48 KG/M2 | OXYGEN SATURATION: 90 % | SYSTOLIC BLOOD PRESSURE: 100 MMHG | HEIGHT: 63 IN

## 2024-02-16 DIAGNOSIS — N30.00 ACUTE CYSTITIS WITHOUT HEMATURIA: Primary | ICD-10-CM

## 2024-02-16 DIAGNOSIS — B37.31 MONILIAL VAGINITIS: ICD-10-CM

## 2024-02-16 LAB
APPEARANCE UR: ABNORMAL
BACTERIA URNS QL MICRO: ABNORMAL /HPF
BILIRUB UR QL CFM: NEGATIVE
COLOR UR: ABNORMAL
EPITH CASTS URNS QL MICRO: ABNORMAL /LPF
GLUCOSE UR STRIP.AUTO-MCNC: >1000 MG/DL
HGB UR QL STRIP: ABNORMAL
KETONES UR QL STRIP.AUTO: NEGATIVE MG/DL
LEUKOCYTE ESTERASE UR QL STRIP.AUTO: ABNORMAL
NITRITE UR QL STRIP.AUTO: POSITIVE
PH UR STRIP: 5 (ref 5–8)
PROT UR STRIP-MCNC: NEGATIVE MG/DL
RBC #/AREA URNS HPF: ABNORMAL /HPF (ref 0–5)
SP GR UR REFRACTOMETRY: 1.03 (ref 1–1.03)
UROBILINOGEN UR QL STRIP.AUTO: 0.2 EU/DL (ref 0.2–1)
WBC URNS QL MICRO: >100 /HPF (ref 0–4)
YEAST URNS QL MICRO: PRESENT

## 2024-02-16 RX ORDER — NITROFURANTOIN 25; 75 MG/1; MG/1
100 CAPSULE ORAL 2 TIMES DAILY
Qty: 20 CAPSULE | Refills: 0 | Status: SHIPPED | OUTPATIENT
Start: 2024-02-16 | End: 2024-02-26

## 2024-02-16 RX ORDER — FLUCONAZOLE 150 MG/1
TABLET ORAL
Qty: 2 TABLET | Refills: 0 | Status: SHIPPED | OUTPATIENT
Start: 2024-02-16

## 2024-02-16 NOTE — PROGRESS NOTES
Rm    Chief Complaint   Patient presents with    Urinary Tract Infection        There were no vitals taken for this visit.     1. Have you been to the ER, urgent care clinic since your last visit?  Hospitalized since your last visit?    2. Have you seen or consulted any other health care providers outside of the [facility] since your last visit?  Include any pap smears or colon screening.     Health Maintenance Due   Topic Date Due    COVID-19 Vaccine (1) Never done    Shingles vaccine (1 of 2) Never done    DEXA (modify frequency per FRAX score)  Never done    Respiratory Syncytial Virus (RSV) Pregnant or age 60 yrs+ (1 - 1-dose 60+ series) Never done    Pneumococcal 65+ years Vaccine (2 - PPSV23 or PCV20) 09/21/2015    Flu vaccine (1) 08/01/2023    Lipids  01/17/2024             No data to display

## 2024-02-16 NOTE — PROGRESS NOTES
Subjective:   Siomara Collins is a 79 y.o. female      Chief Complaint   Patient presents with    Urinary Tract Infection        History of present illness: She presents today complaints of urinary frequency and small amounts after being recently treated for urinary tract infection at the adult home where she is.  She received Cipro for that and did not seem to help the symptoms.  She also notes a lot of redness in the perineal area with burning when she passes urine.  She notes no fevers or chills.  There is no back pain.  There is no nausea vomiting.  She has no other complaints.    Patient Active Problem List   Diagnosis    Controlled type 2 diabetes mellitus with stage 2 chronic kidney disease, with long-term current use of insulin (HCC)    Rosacea    Class 1 obesity due to excess calories without serious comorbidity with body mass index (BMI) of 33.0 to 33.9 in adult    Mixed hyperlipidemia    Alcohol screening    Polymyalgia rheumatica (HCC)    Anxiety    Sleep disturbance    Hypertension with renal disease    Non-seasonal allergic rhinitis    Primary osteoarthritis involving multiple joints    GI bleed    Spinal stenosis, lumbar    Cervical stenosis of spine    Gait instability    IBS (irritable bowel syndrome)    CKD (chronic kidney disease), stage II    Recurrent depression (HCC)    Chronic midline low back pain without sciatica    Vitamin D deficiency    Avascular necrosis of hip (HCC)    Abnormal LFTs    Lumbar disc disease    Overactive bladder    Primary osteoarthritis of right shoulder    Interstitial lung disease (HCC)    Spinal stenosis, cervical region    Tremor    Chronic hypoxemic respiratory failure (HCC)    Neuropathy    Hypoglycemia      Past Medical History:   Diagnosis Date    Abnormal LFTs 08/24/2017    FATTY LIVER    Arthritis     OSTEO    Avascular necrosis of hip (HCC) 08/24/2017    BILAT HIPS    Breast CA (HCC) 1989, 2001    BILATERAL; SURGERY, CHEMO    Chronic pain     CKD (chronic

## 2024-02-16 NOTE — TELEPHONE ENCOUNTER
Pt called and stated she is having some UTI symptoms and will like to be seen by Provider.     Pt scheduled for today  @ 11:10am.

## 2024-02-17 LAB
BACTERIA SPEC CULT: ABNORMAL
CC UR VC: ABNORMAL
SERVICE CMNT-IMP: ABNORMAL

## 2024-02-19 ENCOUNTER — TELEPHONE (OUTPATIENT)
Facility: CLINIC | Age: 79
End: 2024-02-19

## 2024-02-19 LAB
BACTERIA SPEC CULT: ABNORMAL
CC UR VC: ABNORMAL
SERVICE CMNT-IMP: ABNORMAL

## 2024-02-19 NOTE — TELEPHONE ENCOUNTER
Pt called and left a voicemail that she need someone to call her regarding her medication/questions she has.      Left pt a detail message to call the office back

## 2024-02-21 ENCOUNTER — TELEPHONE (OUTPATIENT)
Facility: CLINIC | Age: 79
End: 2024-02-21

## 2024-02-21 ENCOUNTER — APPOINTMENT (OUTPATIENT)
Facility: HOSPITAL | Age: 79
DRG: 758 | End: 2024-02-21
Payer: MEDICARE

## 2024-02-21 ENCOUNTER — HOSPITAL ENCOUNTER (INPATIENT)
Facility: HOSPITAL | Age: 79
LOS: 6 days | Discharge: NURSING, REHAB OR LONG-TERM CARE | DRG: 758 | End: 2024-03-04
Attending: STUDENT IN AN ORGANIZED HEALTH CARE EDUCATION/TRAINING PROGRAM | Admitting: INTERNAL MEDICINE
Payer: MEDICARE

## 2024-02-21 DIAGNOSIS — Z16.12 ESBL (EXTENDED SPECTRUM BETA-LACTAMASE) PRODUCING BACTERIA INFECTION: Primary | ICD-10-CM

## 2024-02-21 DIAGNOSIS — G89.29 CHRONIC MIDLINE LOW BACK PAIN WITHOUT SCIATICA: ICD-10-CM

## 2024-02-21 DIAGNOSIS — A49.9 ESBL (EXTENDED SPECTRUM BETA-LACTAMASE) PRODUCING BACTERIA INFECTION: Primary | ICD-10-CM

## 2024-02-21 DIAGNOSIS — M54.50 CHRONIC MIDLINE LOW BACK PAIN WITHOUT SCIATICA: ICD-10-CM

## 2024-02-21 DIAGNOSIS — N30.00 ACUTE CYSTITIS WITHOUT HEMATURIA: ICD-10-CM

## 2024-02-21 DIAGNOSIS — R26.2 AMBULATORY DYSFUNCTION: ICD-10-CM

## 2024-02-21 DIAGNOSIS — N39.0 URINARY TRACT INFECTION WITHOUT HEMATURIA, SITE UNSPECIFIED: Primary | ICD-10-CM

## 2024-02-21 LAB
ALBUMIN SERPL-MCNC: 3.4 G/DL (ref 3.5–5)
ALBUMIN/GLOB SERPL: 0.9 (ref 1.1–2.2)
ALP SERPL-CCNC: 78 U/L (ref 45–117)
ALT SERPL-CCNC: 36 U/L (ref 12–78)
ANION GAP SERPL CALC-SCNC: 6 MMOL/L (ref 5–15)
AST SERPL-CCNC: 20 U/L (ref 15–37)
BASOPHILS # BLD: 0 K/UL (ref 0–0.1)
BASOPHILS NFR BLD: 0 % (ref 0–1)
BILIRUB SERPL-MCNC: 0.9 MG/DL (ref 0.2–1)
BUN SERPL-MCNC: 27 MG/DL (ref 6–20)
BUN/CREAT SERPL: 36 (ref 12–20)
CALCIUM SERPL-MCNC: 9.3 MG/DL (ref 8.5–10.1)
CHLORIDE SERPL-SCNC: 104 MMOL/L (ref 97–108)
CO2 SERPL-SCNC: 27 MMOL/L (ref 21–32)
CREAT SERPL-MCNC: 0.74 MG/DL (ref 0.55–1.02)
DIFFERENTIAL METHOD BLD: ABNORMAL
EOSINOPHIL # BLD: 0 K/UL (ref 0–0.4)
EOSINOPHIL NFR BLD: 0 % (ref 0–7)
ERYTHROCYTE [DISTWIDTH] IN BLOOD BY AUTOMATED COUNT: 12.9 % (ref 11.5–14.5)
GLOBULIN SER CALC-MCNC: 3.6 G/DL (ref 2–4)
GLUCOSE SERPL-MCNC: 261 MG/DL (ref 65–100)
HCT VFR BLD AUTO: 45.1 % (ref 35–47)
HGB BLD-MCNC: 15.6 G/DL (ref 11.5–16)
IMM GRANULOCYTES # BLD AUTO: 0.1 K/UL (ref 0–0.04)
IMM GRANULOCYTES NFR BLD AUTO: 1 % (ref 0–0.5)
LYMPHOCYTES # BLD: 0.9 K/UL (ref 0.8–3.5)
LYMPHOCYTES NFR BLD: 10 % (ref 12–49)
MAGNESIUM SERPL-MCNC: 1.7 MG/DL (ref 1.6–2.4)
MCH RBC QN AUTO: 35.5 PG (ref 26–34)
MCHC RBC AUTO-ENTMCNC: 34.6 G/DL (ref 30–36.5)
MCV RBC AUTO: 102.7 FL (ref 80–99)
MONOCYTES # BLD: 0.4 K/UL (ref 0–1)
MONOCYTES NFR BLD: 4 % (ref 5–13)
NEUTS SEG # BLD: 7.1 K/UL (ref 1.8–8)
NEUTS SEG NFR BLD: 85 % (ref 32–75)
NRBC # BLD: 0 K/UL (ref 0–0.01)
NRBC BLD-RTO: 0 PER 100 WBC
PHOSPHATE SERPL-MCNC: 4.1 MG/DL (ref 2.6–4.7)
PLATELET # BLD AUTO: 207 K/UL (ref 150–400)
PMV BLD AUTO: 10 FL (ref 8.9–12.9)
POTASSIUM SERPL-SCNC: 3.7 MMOL/L (ref 3.5–5.1)
PROT SERPL-MCNC: 7 G/DL (ref 6.4–8.2)
RBC # BLD AUTO: 4.39 M/UL (ref 3.8–5.2)
SODIUM SERPL-SCNC: 137 MMOL/L (ref 136–145)
TSH SERPL DL<=0.05 MIU/L-ACNC: 0.21 UIU/ML (ref 0.36–3.74)
WBC # BLD AUTO: 8.5 K/UL (ref 3.6–11)

## 2024-02-21 PROCEDURE — 96365 THER/PROPH/DIAG IV INF INIT: CPT

## 2024-02-21 PROCEDURE — 2580000003 HC RX 258: Performed by: STUDENT IN AN ORGANIZED HEALTH CARE EDUCATION/TRAINING PROGRAM

## 2024-02-21 PROCEDURE — 84443 ASSAY THYROID STIM HORMONE: CPT

## 2024-02-21 PROCEDURE — 85025 COMPLETE CBC W/AUTO DIFF WBC: CPT

## 2024-02-21 PROCEDURE — 84100 ASSAY OF PHOSPHORUS: CPT

## 2024-02-21 PROCEDURE — 84436 ASSAY OF TOTAL THYROXINE: CPT

## 2024-02-21 PROCEDURE — 6360000002 HC RX W HCPCS: Performed by: STUDENT IN AN ORGANIZED HEALTH CARE EDUCATION/TRAINING PROGRAM

## 2024-02-21 PROCEDURE — 96375 TX/PRO/DX INJ NEW DRUG ADDON: CPT

## 2024-02-21 PROCEDURE — 36415 COLL VENOUS BLD VENIPUNCTURE: CPT

## 2024-02-21 PROCEDURE — 81001 URINALYSIS AUTO W/SCOPE: CPT

## 2024-02-21 PROCEDURE — 87106 FUNGI IDENTIFICATION YEAST: CPT

## 2024-02-21 PROCEDURE — 84439 ASSAY OF FREE THYROXINE: CPT

## 2024-02-21 PROCEDURE — 71045 X-RAY EXAM CHEST 1 VIEW: CPT

## 2024-02-21 PROCEDURE — 2500000003 HC RX 250 WO HCPCS: Performed by: STUDENT IN AN ORGANIZED HEALTH CARE EDUCATION/TRAINING PROGRAM

## 2024-02-21 PROCEDURE — 83735 ASSAY OF MAGNESIUM: CPT

## 2024-02-21 PROCEDURE — 87086 URINE CULTURE/COLONY COUNT: CPT

## 2024-02-21 PROCEDURE — 99285 EMERGENCY DEPT VISIT HI MDM: CPT

## 2024-02-21 PROCEDURE — 80053 COMPREHEN METABOLIC PANEL: CPT

## 2024-02-21 RX ORDER — GRANULES FOR ORAL 3 G/1
3 POWDER ORAL ONCE
Qty: 1 EACH | Refills: 0 | Status: SHIPPED | OUTPATIENT
Start: 2024-02-21 | End: 2024-02-21

## 2024-02-21 RX ORDER — HYDROMORPHONE HYDROCHLORIDE 1 MG/ML
0.5 INJECTION, SOLUTION INTRAMUSCULAR; INTRAVENOUS; SUBCUTANEOUS
Status: COMPLETED | OUTPATIENT
Start: 2024-02-21 | End: 2024-02-21

## 2024-02-21 RX ADMIN — HYDROMORPHONE HYDROCHLORIDE 0.5 MG: 1 INJECTION, SOLUTION INTRAMUSCULAR; INTRAVENOUS; SUBCUTANEOUS at 21:47

## 2024-02-21 RX ADMIN — MEROPENEM 1000 MG: 1 INJECTION, POWDER, FOR SOLUTION INTRAVENOUS at 21:53

## 2024-02-21 ASSESSMENT — PAIN SCALES - GENERAL
PAINLEVEL_OUTOF10: 10
PAINLEVEL_OUTOF10: 9

## 2024-02-21 ASSESSMENT — PAIN DESCRIPTION - ORIENTATION: ORIENTATION: RIGHT;LEFT

## 2024-02-21 ASSESSMENT — PAIN DESCRIPTION - DESCRIPTORS: DESCRIPTORS: ACHING

## 2024-02-21 ASSESSMENT — PAIN DESCRIPTION - LOCATION: LOCATION: ANKLE

## 2024-02-21 NOTE — TELEPHONE ENCOUNTER
Received contact from lab on 02/21/2024 at 7:50 AM regarding patient's urine culture.  Urine culture positive for Klebsiella pneumonia which shows resistance against current antibiotic which was prescribed (nitrofurantoin).  Fosfomycin sent to pharmacy.  Patient contacted and told to discontinue and discard nitrofurantoin.    Denton Barnett, MSN APRN FNP-BC

## 2024-02-22 PROBLEM — B96.29 UTI DUE TO EXTENDED-SPECTRUM BETA LACTAMASE (ESBL) PRODUCING ESCHERICHIA COLI: Status: ACTIVE | Noted: 2024-02-22

## 2024-02-22 PROBLEM — N39.0 UTI DUE TO EXTENDED-SPECTRUM BETA LACTAMASE (ESBL) PRODUCING ESCHERICHIA COLI: Status: ACTIVE | Noted: 2024-02-22

## 2024-02-22 PROBLEM — Z16.12 UTI DUE TO EXTENDED-SPECTRUM BETA LACTAMASE (ESBL) PRODUCING ESCHERICHIA COLI: Status: ACTIVE | Noted: 2024-02-22

## 2024-02-22 LAB
ANION GAP SERPL CALC-SCNC: 7 MMOL/L (ref 5–15)
APPEARANCE UR: ABNORMAL
BACTERIA URNS QL MICRO: ABNORMAL /HPF
BILIRUB UR QL: NEGATIVE
BUN SERPL-MCNC: 23 MG/DL (ref 6–20)
BUN/CREAT SERPL: 49 (ref 12–20)
CALCIUM SERPL-MCNC: 8.4 MG/DL (ref 8.5–10.1)
CHLORIDE SERPL-SCNC: 108 MMOL/L (ref 97–108)
CO2 SERPL-SCNC: 23 MMOL/L (ref 21–32)
COLOR UR: ABNORMAL
CREAT SERPL-MCNC: 0.47 MG/DL (ref 0.55–1.02)
EPITH CASTS URNS QL MICRO: ABNORMAL /LPF
GLUCOSE BLD STRIP.AUTO-MCNC: 255 MG/DL (ref 65–117)
GLUCOSE BLD STRIP.AUTO-MCNC: 270 MG/DL (ref 65–117)
GLUCOSE BLD STRIP.AUTO-MCNC: 304 MG/DL (ref 65–117)
GLUCOSE SERPL-MCNC: 131 MG/DL (ref 65–100)
GLUCOSE UR STRIP.AUTO-MCNC: 500 MG/DL
HGB UR QL STRIP: ABNORMAL
KETONES UR QL STRIP.AUTO: ABNORMAL MG/DL
LEUKOCYTE ESTERASE UR QL STRIP.AUTO: ABNORMAL
MUCOUS THREADS URNS QL MICRO: ABNORMAL /LPF
NITRITE UR QL STRIP.AUTO: POSITIVE
PH UR STRIP: 5.5 (ref 5–8)
POTASSIUM SERPL-SCNC: 3.2 MMOL/L (ref 3.5–5.1)
PROT UR STRIP-MCNC: ABNORMAL MG/DL
RBC #/AREA URNS HPF: ABNORMAL /HPF (ref 0–5)
SERVICE CMNT-IMP: ABNORMAL
SODIUM SERPL-SCNC: 138 MMOL/L (ref 136–145)
SP GR UR REFRACTOMETRY: 1.02
T4 FREE SERPL-MCNC: 1 NG/DL (ref 0.8–1.5)
T4 SERPL-MCNC: 6.1 UG/DL (ref 4.8–13.9)
URINE CULTURE IF INDICATED: ABNORMAL
UROBILINOGEN UR QL STRIP.AUTO: 1 EU/DL (ref 0.2–1)
WBC URNS QL MICRO: ABNORMAL /HPF (ref 0–4)
YEAST URNS QL MICRO: PRESENT

## 2024-02-22 PROCEDURE — 2580000003 HC RX 258: Performed by: STUDENT IN AN ORGANIZED HEALTH CARE EDUCATION/TRAINING PROGRAM

## 2024-02-22 PROCEDURE — 2580000003 HC RX 258: Performed by: INTERNAL MEDICINE

## 2024-02-22 PROCEDURE — 82962 GLUCOSE BLOOD TEST: CPT

## 2024-02-22 PROCEDURE — 6360000002 HC RX W HCPCS: Performed by: STUDENT IN AN ORGANIZED HEALTH CARE EDUCATION/TRAINING PROGRAM

## 2024-02-22 PROCEDURE — 6370000000 HC RX 637 (ALT 250 FOR IP): Performed by: INTERNAL MEDICINE

## 2024-02-22 PROCEDURE — 36415 COLL VENOUS BLD VENIPUNCTURE: CPT

## 2024-02-22 PROCEDURE — G0378 HOSPITAL OBSERVATION PER HR: HCPCS

## 2024-02-22 PROCEDURE — 96366 THER/PROPH/DIAG IV INF ADDON: CPT

## 2024-02-22 PROCEDURE — 96372 THER/PROPH/DIAG INJ SC/IM: CPT

## 2024-02-22 PROCEDURE — 02HV33Z INSERTION OF INFUSION DEVICE INTO SUPERIOR VENA CAVA, PERCUTANEOUS APPROACH: ICD-10-PCS | Performed by: INTERNAL MEDICINE

## 2024-02-22 PROCEDURE — 6360000002 HC RX W HCPCS: Performed by: INTERNAL MEDICINE

## 2024-02-22 PROCEDURE — 96376 TX/PRO/DX INJ SAME DRUG ADON: CPT

## 2024-02-22 PROCEDURE — 80048 BASIC METABOLIC PNL TOTAL CA: CPT

## 2024-02-22 RX ORDER — PRIMIDONE 50 MG/1
50 TABLET ORAL 2 TIMES DAILY
Status: DISCONTINUED | OUTPATIENT
Start: 2024-02-22 | End: 2024-03-04 | Stop reason: HOSPADM

## 2024-02-22 RX ORDER — CARVEDILOL 12.5 MG/1
12.5 TABLET ORAL 2 TIMES DAILY
Status: DISCONTINUED | OUTPATIENT
Start: 2024-02-22 | End: 2024-03-04 | Stop reason: HOSPADM

## 2024-02-22 RX ORDER — PANTOPRAZOLE SODIUM 40 MG/1
40 TABLET, DELAYED RELEASE ORAL DAILY
Status: DISCONTINUED | OUTPATIENT
Start: 2024-02-22 | End: 2024-03-04 | Stop reason: HOSPADM

## 2024-02-22 RX ORDER — QUETIAPINE FUMARATE 25 MG/1
25 TABLET, FILM COATED ORAL
COMMUNITY
Start: 2024-02-01

## 2024-02-22 RX ORDER — ROSUVASTATIN CALCIUM 20 MG/1
20 TABLET, COATED ORAL NIGHTLY
Status: DISCONTINUED | OUTPATIENT
Start: 2024-02-22 | End: 2024-03-04 | Stop reason: HOSPADM

## 2024-02-22 RX ORDER — LISINOPRIL 10 MG/1
10 TABLET ORAL DAILY
COMMUNITY
Start: 2024-01-24

## 2024-02-22 RX ORDER — PREDNISONE 20 MG/1
20 TABLET ORAL DAILY
COMMUNITY
Start: 2024-02-13

## 2024-02-22 RX ORDER — ACETAMINOPHEN 650 MG/1
650 SUPPOSITORY RECTAL EVERY 6 HOURS PRN
Status: DISCONTINUED | OUTPATIENT
Start: 2024-02-22 | End: 2024-03-04 | Stop reason: HOSPADM

## 2024-02-22 RX ORDER — CLONAZEPAM 1 MG/1
1 TABLET ORAL DAILY
Status: DISCONTINUED | OUTPATIENT
Start: 2024-02-22 | End: 2024-03-04 | Stop reason: HOSPADM

## 2024-02-22 RX ORDER — DEXTROSE MONOHYDRATE 100 MG/ML
INJECTION, SOLUTION INTRAVENOUS CONTINUOUS PRN
Status: DISCONTINUED | OUTPATIENT
Start: 2024-02-22 | End: 2024-03-04 | Stop reason: HOSPADM

## 2024-02-22 RX ORDER — POLYETHYLENE GLYCOL 3350 17 G/17G
17 POWDER, FOR SOLUTION ORAL DAILY PRN
Status: DISCONTINUED | OUTPATIENT
Start: 2024-02-22 | End: 2024-03-04 | Stop reason: HOSPADM

## 2024-02-22 RX ORDER — ONDANSETRON 4 MG/1
4 TABLET, ORALLY DISINTEGRATING ORAL EVERY 8 HOURS PRN
Status: DISCONTINUED | OUTPATIENT
Start: 2024-02-22 | End: 2024-03-04 | Stop reason: HOSPADM

## 2024-02-22 RX ORDER — POTASSIUM CHLORIDE 20 MEQ/1
40 TABLET, EXTENDED RELEASE ORAL EVERY 4 HOURS
Status: COMPLETED | OUTPATIENT
Start: 2024-02-22 | End: 2024-02-22

## 2024-02-22 RX ORDER — SODIUM CHLORIDE 0.9 % (FLUSH) 0.9 %
5-40 SYRINGE (ML) INJECTION PRN
Status: DISCONTINUED | OUTPATIENT
Start: 2024-02-22 | End: 2024-03-04 | Stop reason: HOSPADM

## 2024-02-22 RX ORDER — INSULIN GLARGINE 100 [IU]/ML
20 INJECTION, SOLUTION SUBCUTANEOUS DAILY
Status: DISCONTINUED | OUTPATIENT
Start: 2024-02-22 | End: 2024-02-23

## 2024-02-22 RX ORDER — ENOXAPARIN SODIUM 100 MG/ML
40 INJECTION SUBCUTANEOUS DAILY
Status: DISCONTINUED | OUTPATIENT
Start: 2024-02-22 | End: 2024-03-04 | Stop reason: HOSPADM

## 2024-02-22 RX ORDER — INSULIN LISPRO 100 [IU]/ML
0-8 INJECTION, SOLUTION INTRAVENOUS; SUBCUTANEOUS
Status: DISCONTINUED | OUTPATIENT
Start: 2024-02-22 | End: 2024-03-04 | Stop reason: HOSPADM

## 2024-02-22 RX ORDER — LISINOPRIL 5 MG/1
10 TABLET ORAL DAILY
Status: DISCONTINUED | OUTPATIENT
Start: 2024-02-22 | End: 2024-03-04 | Stop reason: HOSPADM

## 2024-02-22 RX ORDER — ACETAMINOPHEN 325 MG/1
650 TABLET ORAL EVERY 6 HOURS PRN
Status: DISCONTINUED | OUTPATIENT
Start: 2024-02-22 | End: 2024-03-04 | Stop reason: HOSPADM

## 2024-02-22 RX ORDER — ONDANSETRON 2 MG/ML
4 INJECTION INTRAMUSCULAR; INTRAVENOUS EVERY 6 HOURS PRN
Status: DISCONTINUED | OUTPATIENT
Start: 2024-02-22 | End: 2024-03-04 | Stop reason: HOSPADM

## 2024-02-22 RX ORDER — SODIUM CHLORIDE 9 MG/ML
INJECTION, SOLUTION INTRAVENOUS PRN
Status: DISCONTINUED | OUTPATIENT
Start: 2024-02-22 | End: 2024-03-04 | Stop reason: HOSPADM

## 2024-02-22 RX ORDER — LIDOCAINE 4 G/G
1 PATCH TOPICAL DAILY
Status: DISCONTINUED | OUTPATIENT
Start: 2024-02-22 | End: 2024-03-04 | Stop reason: HOSPADM

## 2024-02-22 RX ORDER — MONTELUKAST SODIUM 10 MG/1
10 TABLET ORAL NIGHTLY
Status: DISCONTINUED | OUTPATIENT
Start: 2024-02-22 | End: 2024-03-04 | Stop reason: HOSPADM

## 2024-02-22 RX ORDER — SODIUM CHLORIDE 0.9 % (FLUSH) 0.9 %
5-40 SYRINGE (ML) INJECTION EVERY 12 HOURS SCHEDULED
Status: DISCONTINUED | OUTPATIENT
Start: 2024-02-22 | End: 2024-03-04 | Stop reason: HOSPADM

## 2024-02-22 RX ORDER — PREDNISONE 20 MG/1
20 TABLET ORAL DAILY
Status: DISCONTINUED | OUTPATIENT
Start: 2024-02-22 | End: 2024-03-04 | Stop reason: HOSPADM

## 2024-02-22 RX ORDER — INSULIN LISPRO 100 [IU]/ML
0-4 INJECTION, SOLUTION INTRAVENOUS; SUBCUTANEOUS NIGHTLY
Status: DISCONTINUED | OUTPATIENT
Start: 2024-02-22 | End: 2024-03-04 | Stop reason: HOSPADM

## 2024-02-22 RX ADMIN — ENOXAPARIN SODIUM 40 MG: 100 INJECTION SUBCUTANEOUS at 10:43

## 2024-02-22 RX ADMIN — DICLOFENAC SODIUM 2 G: 10 GEL TOPICAL at 11:43

## 2024-02-22 RX ADMIN — POTASSIUM CHLORIDE 40 MEQ: 1500 TABLET, EXTENDED RELEASE ORAL at 10:49

## 2024-02-22 RX ADMIN — CARVEDILOL 12.5 MG: 12.5 TABLET, FILM COATED ORAL at 04:20

## 2024-02-22 RX ADMIN — CARVEDILOL 12.5 MG: 12.5 TABLET, FILM COATED ORAL at 10:42

## 2024-02-22 RX ADMIN — MONTELUKAST 10 MG: 10 TABLET, FILM COATED ORAL at 22:08

## 2024-02-22 RX ADMIN — Medication 2 G: at 22:11

## 2024-02-22 RX ADMIN — PANTOPRAZOLE SODIUM 40 MG: 40 TABLET, DELAYED RELEASE ORAL at 10:42

## 2024-02-22 RX ADMIN — MEROPENEM 1000 MG: 1 INJECTION, POWDER, FOR SOLUTION INTRAVENOUS at 14:11

## 2024-02-22 RX ADMIN — MEROPENEM 1000 MG: 1 INJECTION, POWDER, FOR SOLUTION INTRAVENOUS at 22:15

## 2024-02-22 RX ADMIN — SODIUM CHLORIDE, PRESERVATIVE FREE 10 ML: 5 INJECTION INTRAVENOUS at 22:11

## 2024-02-22 RX ADMIN — SODIUM CHLORIDE, PRESERVATIVE FREE 10 ML: 5 INJECTION INTRAVENOUS at 10:45

## 2024-02-22 RX ADMIN — PRIMIDONE 50 MG: 50 TABLET ORAL at 23:02

## 2024-02-22 RX ADMIN — PRIMIDONE 50 MG: 50 TABLET ORAL at 04:19

## 2024-02-22 RX ADMIN — POTASSIUM CHLORIDE 40 MEQ: 1500 TABLET, EXTENDED RELEASE ORAL at 14:42

## 2024-02-22 RX ADMIN — PREDNISONE 20 MG: 20 TABLET ORAL at 10:42

## 2024-02-22 RX ADMIN — Medication 2 G: at 18:13

## 2024-02-22 RX ADMIN — INSULIN LISPRO 6 UNITS: 100 INJECTION, SOLUTION INTRAVENOUS; SUBCUTANEOUS at 18:29

## 2024-02-22 RX ADMIN — CARVEDILOL 12.5 MG: 12.5 TABLET, FILM COATED ORAL at 22:08

## 2024-02-22 RX ADMIN — INSULIN GLARGINE 20 UNITS: 100 INJECTION, SOLUTION SUBCUTANEOUS at 13:21

## 2024-02-22 RX ADMIN — DICLOFENAC SODIUM 2 G: 10 GEL TOPICAL at 04:23

## 2024-02-22 RX ADMIN — MEROPENEM 1000 MG: 1 INJECTION, POWDER, FOR SOLUTION INTRAVENOUS at 04:23

## 2024-02-22 RX ADMIN — LISINOPRIL 10 MG: 5 TABLET ORAL at 10:42

## 2024-02-22 ASSESSMENT — PAIN SCALES - GENERAL
PAINLEVEL_OUTOF10: 10
PAINLEVEL_OUTOF10: 10
PAINLEVEL_OUTOF10: 6
PAINLEVEL_OUTOF10: 8

## 2024-02-22 ASSESSMENT — PAIN DESCRIPTION - DESCRIPTORS
DESCRIPTORS: ACHING;CRAMPING
DESCRIPTORS: ACHING;CRAMPING

## 2024-02-22 ASSESSMENT — PAIN DESCRIPTION - LOCATION
LOCATION: LEG
LOCATION: LEG
LOCATION: FOOT;LEG;BACK
LOCATION: FOOT;LEG;BACK

## 2024-02-22 ASSESSMENT — PAIN DESCRIPTION - ONSET: ONSET: ON-GOING

## 2024-02-22 ASSESSMENT — PAIN DESCRIPTION - FREQUENCY: FREQUENCY: CONTINUOUS

## 2024-02-22 ASSESSMENT — PAIN DESCRIPTION - PAIN TYPE
TYPE: CHRONIC PAIN
TYPE: CHRONIC PAIN

## 2024-02-22 ASSESSMENT — PAIN DESCRIPTION - ORIENTATION
ORIENTATION: RIGHT;LEFT
ORIENTATION: LEFT;RIGHT

## 2024-02-22 NOTE — PROCEDURES
Midline request acknowledged.  Midline ordered for long-term IV antibiotics.  Discussed with primary nurse, patient is not being discharged today and has working PIV.  No final antibiotic regimen noted in chart.  Also, noted patient has hx of bilateral mastectomies.

## 2024-02-22 NOTE — PLAN OF CARE
Problem: Pain  Goal: Verbalizes/displays adequate comfort level or baseline comfort level  2/22/2024 1027 by Angy Fitzgerald, RN  Flowsheets (Taken 2/22/2024 0933)  Verbalizes/displays adequate comfort level or baseline comfort level:   Encourage patient to monitor pain and request assistance   Administer analgesics based on type and severity of pain and evaluate response   Consider cultural and social influences on pain and pain management   Assess pain using appropriate pain scale   Implement non-pharmacological measures as appropriate and evaluate response   Notify Licensed Independent Practitioner if interventions unsuccessful or patient reports new pain  Note: Patient educated on positioning and pain management  2/22/2024 1026 by Angy Fitzgerald, RN  Flowsheets (Taken 2/22/2024 0933)  Verbalizes/displays adequate comfort level or baseline comfort level:   Encourage patient to monitor pain and request assistance   Administer analgesics based on type and severity of pain and evaluate response   Consider cultural and social influences on pain and pain management   Assess pain using appropriate pain scale   Implement non-pharmacological measures as appropriate and evaluate response   Notify Licensed Independent Practitioner if interventions unsuccessful or patient reports new pain  2/22/2024 1025 by Angy Fitzgerald, RN  Outcome: Not Progressing  2/22/2024 1024 by Angy Fitzgerald, RN  Flowsheets (Taken 2/22/2024 0933)  Verbalizes/displays adequate comfort level or baseline comfort level:   Encourage patient to monitor pain and request assistance   Administer analgesics based on type and severity of pain and evaluate response   Consider cultural and social influences on pain and pain management   Assess pain using appropriate pain scale   Implement non-pharmacological measures as appropriate and evaluate response   Notify Licensed Independent Practitioner if interventions unsuccessful or patient reports new pain  2/22/2024  1017 by Angy Fitzgerald, RN  Outcome: Not Progressing  Flowsheets (Taken 2/22/2024 0933)  Verbalizes/displays adequate comfort level or baseline comfort level:   Encourage patient to monitor pain and request assistance   Administer analgesics based on type and severity of pain and evaluate response   Consider cultural and social influences on pain and pain management   Assess pain using appropriate pain scale   Implement non-pharmacological measures as appropriate and evaluate response   Notify Licensed Independent Practitioner if interventions unsuccessful or patient reports new pain     Problem: ABCDS Injury Assessment  Goal: Absence of physical injury  2/22/2024 1025 by Angy Fitzgerald, RN  Outcome: Progressing  2/22/2024 1024 by Angy Fitzgerald, RN  Flowsheets (Taken 2/22/2024 1024)  Absence of Physical Injury: Implement safety measures based on patient assessment  Note: Patient educated to call out when she needs assistance  2/22/2024 1024 by Angy Fitzgerald, RN  Outcome: Not Progressing  Flowsheets (Taken 2/22/2024 1024)  Absence of Physical Injury: Implement safety measures based on patient assessment

## 2024-02-22 NOTE — PLAN OF CARE
Problem: Pain  Goal: Verbalizes/displays adequate comfort level or baseline comfort level  2/22/2024 1025 by Angy Fitzgerald, RN  Outcome: Not Progressing  2/22/2024 1024 by Angy Fitzgerald, RN  Flowsheets (Taken 2/22/2024 0933)  Verbalizes/displays adequate comfort level or baseline comfort level:   Encourage patient to monitor pain and request assistance   Administer analgesics based on type and severity of pain and evaluate response   Consider cultural and social influences on pain and pain management   Assess pain using appropriate pain scale   Implement non-pharmacological measures as appropriate and evaluate response   Notify Licensed Independent Practitioner if interventions unsuccessful or patient reports new pain  2/22/2024 1017 by Angy Fitzgerald, RN  Outcome: Not Progressing  Flowsheets (Taken 2/22/2024 0933)  Verbalizes/displays adequate comfort level or baseline comfort level:   Encourage patient to monitor pain and request assistance   Administer analgesics based on type and severity of pain and evaluate response   Consider cultural and social influences on pain and pain management   Assess pain using appropriate pain scale   Implement non-pharmacological measures as appropriate and evaluate response   Notify Licensed Independent Practitioner if interventions unsuccessful or patient reports new pain     Problem: ABCDS Injury Assessment  Goal: Absence of physical injury  2/22/2024 1025 by Angy Fitzgerald, RN  Outcome: Progressing  2/22/2024 1024 by Angy Fitzgerald, RN  Flowsheets (Taken 2/22/2024 1024)  Absence of Physical Injury: Implement safety measures based on patient assessment  Note: Patient educated to call out when she needs assistance  2/22/2024 1024 by Angy Fitzgerald, RN  Outcome: Not Progressing  Flowsheets (Taken 2/22/2024 1024)  Absence of Physical Injury: Implement safety measures based on patient assessment

## 2024-02-22 NOTE — PLAN OF CARE
Problem: Pain  Goal: Verbalizes/displays adequate comfort level or baseline comfort level  Outcome: Not Progressing  Flowsheets (Taken 2/22/2024 1299)  Verbalizes/displays adequate comfort level or baseline comfort level:   Encourage patient to monitor pain and request assistance   Administer analgesics based on type and severity of pain and evaluate response   Consider cultural and social influences on pain and pain management   Assess pain using appropriate pain scale   Implement non-pharmacological measures as appropriate and evaluate response   Notify Licensed Independent Practitioner if interventions unsuccessful or patient reports new pain

## 2024-02-22 NOTE — PROGRESS NOTES
Patient called management to complain about her pain management. Patient stated that she needed her lidocaine patches. Patient also complained that she needed her voltaren cream, that of which was received from pharmacy at this time. Patient stated that her cream is to be applied 4 x a day, rather than 2 x a day. Made Dr. Roldan aware of the Patients complaints.

## 2024-02-22 NOTE — ED NOTES
Assumed care of pt at this time. Pt arrives via EMS with reports of b/l leg, knee, and ankle pain for 3 months. Pt states she was dx with UTI at another facility. Pt on monitor x2 with call bell in reach.

## 2024-02-22 NOTE — PLAN OF CARE
Problem: Pain  Goal: Verbalizes/displays adequate comfort level or baseline comfort level  2/22/2024 1024 by Angy Fitzgerald, RN  Flowsheets (Taken 2/22/2024 0933)  Verbalizes/displays adequate comfort level or baseline comfort level:   Encourage patient to monitor pain and request assistance   Administer analgesics based on type and severity of pain and evaluate response   Consider cultural and social influences on pain and pain management   Assess pain using appropriate pain scale   Implement non-pharmacological measures as appropriate and evaluate response   Notify Licensed Independent Practitioner if interventions unsuccessful or patient reports new pain  2/22/2024 1017 by Angy Fitzgerald, RN  Outcome: Not Progressing  Flowsheets (Taken 2/22/2024 0933)  Verbalizes/displays adequate comfort level or baseline comfort level:   Encourage patient to monitor pain and request assistance   Administer analgesics based on type and severity of pain and evaluate response   Consider cultural and social influences on pain and pain management   Assess pain using appropriate pain scale   Implement non-pharmacological measures as appropriate and evaluate response   Notify Licensed Independent Practitioner if interventions unsuccessful or patient reports new pain     Problem: Pain  Goal: Verbalizes/displays adequate comfort level or baseline comfort level  2/22/2024 1024 by Angy Fitzgerald, RN  Flowsheets (Taken 2/22/2024 0933)  Verbalizes/displays adequate comfort level or baseline comfort level:   Encourage patient to monitor pain and request assistance   Administer analgesics based on type and severity of pain and evaluate response   Consider cultural and social influences on pain and pain management   Assess pain using appropriate pain scale   Implement non-pharmacological measures as appropriate and evaluate response   Notify Licensed Independent Practitioner if interventions unsuccessful or patient reports new pain  2/22/2024 1017  by Angy Fitzgerald, RN  Outcome: Not Progressing  Flowsheets (Taken 2/22/2024 0933)  Verbalizes/displays adequate comfort level or baseline comfort level:   Encourage patient to monitor pain and request assistance   Administer analgesics based on type and severity of pain and evaluate response   Consider cultural and social influences on pain and pain management   Assess pain using appropriate pain scale   Implement non-pharmacological measures as appropriate and evaluate response   Notify Licensed Independent Practitioner if interventions unsuccessful or patient reports new pain     Problem: ABCDS Injury Assessment  Goal: Absence of physical injury  2/22/2024 1024 by Angy Fitzgerald, RN  Flowsheets (Taken 2/22/2024 1024)  Absence of Physical Injury: Implement safety measures based on patient assessment  Note: Patient educated to call out when she needs assistance  2/22/2024 1024 by Angy Fitzgerald, RN  Outcome: Not Progressing  Flowsheets (Taken 2/22/2024 1024)  Absence of Physical Injury: Implement safety measures based on patient assessment

## 2024-02-22 NOTE — H&P
K/UL    Monocytes Absolute 0.4 0.0 - 1.0 K/UL    Eosinophils Absolute 0.0 0.0 - 0.4 K/UL    Basophils Absolute 0.0 0.0 - 0.1 K/UL    Absolute Immature Granulocyte 0.1 (H) 0.00 - 0.04 K/UL    Differential Type AUTOMATED     CMP    Collection Time: 02/21/24  8:04 PM   Result Value Ref Range    Sodium 137 136 - 145 mmol/L    Potassium 3.7 3.5 - 5.1 mmol/L    Chloride 104 97 - 108 mmol/L    CO2 27 21 - 32 mmol/L    Anion Gap 6 5 - 15 mmol/L    Glucose 261 (H) 65 - 100 mg/dL    BUN 27 (H) 6 - 20 MG/DL    Creatinine 0.74 0.55 - 1.02 MG/DL    Bun/Cre Ratio 36 (H) 12 - 20      Est, Glom Filt Rate >60 >60 ml/min/1.73m2    Calcium 9.3 8.5 - 10.1 MG/DL    Total Bilirubin 0.9 0.2 - 1.0 MG/DL    ALT 36 12 - 78 U/L    AST 20 15 - 37 U/L    Alk Phosphatase 78 45 - 117 U/L    Total Protein 7.0 6.4 - 8.2 g/dL    Albumin 3.4 (L) 3.5 - 5.0 g/dL    Globulin 3.6 2.0 - 4.0 g/dL    Albumin/Globulin Ratio 0.9 (L) 1.1 - 2.2     Magnesium    Collection Time: 02/21/24  8:04 PM   Result Value Ref Range    Magnesium 1.7 1.6 - 2.4 mg/dL   Phosphorus    Collection Time: 02/21/24  8:04 PM   Result Value Ref Range    Phosphorus 4.1 2.6 - 4.7 MG/DL   TSH    Collection Time: 02/21/24  8:04 PM   Result Value Ref Range    TSH, 3RD GENERATION 0.21 (L) 0.36 - 3.74 uIU/mL   Urinalysis with Reflex to Culture    Collection Time: 02/21/24 11:39 PM    Specimen: Urine   Result Value Ref Range    Color, UA DARK YELLOW      Appearance TURBID (A) CLEAR      Specific Gravity, UA 1.022      pH, Urine 5.5 5.0 - 8.0      Protein, UA TRACE (A) NEG mg/dL    Glucose,  (A) NEG mg/dL    Ketones, Urine TRACE (A) NEG mg/dL    Bilirubin Urine Negative NEG      Blood, Urine MODERATE (A) NEG      Urobilinogen, Urine 1.0 0.2 - 1.0 EU/dL    Nitrite, Urine Positive (A) NEG      Leukocyte Esterase, Urine MODERATE (A) NEG      WBC, UA  0 - 4 /hpf    RBC, UA 10-20 0 - 5 /hpf    Epithelial Cells UA FEW FEW /lpf    BACTERIA, URINE 4+ (A) NEG /hpf    Urine Culture if  Indicated URINE CULTURE ORDERED (A) CNI      Mucus, UA 2+ (A) NEG /lpf    Yeast, UA PRESENT (A) NEG           XR CHEST PORTABLE    Result Date: 2/21/2024  EXAM:  XR CHEST PORTABLE INDICATION: Weakness COMPARISON: 3/12/2023 TECHNIQUE: portable chest AP view FINDINGS: The cardiac silhouette is within normal limits. The pulmonary vasculature is within normal limits. Lung volumes are moderate with prominent perihilar lung markings and minimal bibasilar atelectasis. The visualized bones and upper abdomen are age-appropriate, except for elevation of the right hemidiaphragm with surgical clips over the right upper quadrant and chest wall. Thoracolumbar posterior fixation..     Minimal bibasilar atelectasis. No consolidation or darien CHF.      _______________________________________________________________________    TOTAL TIME:  76 Minutes    Critical Care Provided     Minutes non procedure based    Signed: Romel Yun MD    Procedures: see electronic medical records for all procedures/Xrays and details which were not copied into this note but were reviewed prior to creation of Plan.

## 2024-02-22 NOTE — PROGRESS NOTES
Patient admitted earlier today for ESBL UTI, requiring IV antibiotics.   -Get midline  -Follow urine culture  - See H&P for further details

## 2024-02-22 NOTE — ED NOTES
Report given to YUDELKA Santiago and SN Debbi. Nurse was informed of reason for arrival, vitals, labs, medications, orders, procedures, results, anything left pending and current plan of action. Questions were asked and received prior to departure from the patient.

## 2024-02-22 NOTE — PLAN OF CARE
Problem: Pain  Goal: Verbalizes/displays adequate comfort level or baseline comfort level  2/22/2024 1024 by Angy Fitzgerald, RN  Flowsheets (Taken 2/22/2024 0933)  Verbalizes/displays adequate comfort level or baseline comfort level:   Encourage patient to monitor pain and request assistance   Administer analgesics based on type and severity of pain and evaluate response   Consider cultural and social influences on pain and pain management   Assess pain using appropriate pain scale   Implement non-pharmacological measures as appropriate and evaluate response   Notify Licensed Independent Practitioner if interventions unsuccessful or patient reports new pain  2/22/2024 1017 by Angy Fitzgerald, RN  Outcome: Not Progressing  Flowsheets (Taken 2/22/2024 0933)  Verbalizes/displays adequate comfort level or baseline comfort level:   Encourage patient to monitor pain and request assistance   Administer analgesics based on type and severity of pain and evaluate response   Consider cultural and social influences on pain and pain management   Assess pain using appropriate pain scale   Implement non-pharmacological measures as appropriate and evaluate response   Notify Licensed Independent Practitioner if interventions unsuccessful or patient reports new pain     Problem: ABCDS Injury Assessment  Goal: Absence of physical injury  Outcome: Not Progressing

## 2024-02-22 NOTE — PLAN OF CARE
Problem: Pain  Goal: Verbalizes/displays adequate comfort level or baseline comfort level  2/22/2024 1026 by Angy Fitzgerald, RN  Flowsheets (Taken 2/22/2024 0933)  Verbalizes/displays adequate comfort level or baseline comfort level:   Encourage patient to monitor pain and request assistance   Administer analgesics based on type and severity of pain and evaluate response   Consider cultural and social influences on pain and pain management   Assess pain using appropriate pain scale   Implement non-pharmacological measures as appropriate and evaluate response   Notify Licensed Independent Practitioner if interventions unsuccessful or patient reports new pain  2/22/2024 1025 by Angy Fitzgerald, RN  Outcome: Not Progressing  2/22/2024 1024 by Angy Fitzgerald, RN  Flowsheets (Taken 2/22/2024 0933)  Verbalizes/displays adequate comfort level or baseline comfort level:   Encourage patient to monitor pain and request assistance   Administer analgesics based on type and severity of pain and evaluate response   Consider cultural and social influences on pain and pain management   Assess pain using appropriate pain scale   Implement non-pharmacological measures as appropriate and evaluate response   Notify Licensed Independent Practitioner if interventions unsuccessful or patient reports new pain  2/22/2024 1017 by Angy Fitzgerald, RN  Outcome: Not Progressing  Flowsheets (Taken 2/22/2024 0933)  Verbalizes/displays adequate comfort level or baseline comfort level:   Encourage patient to monitor pain and request assistance   Administer analgesics based on type and severity of pain and evaluate response   Consider cultural and social influences on pain and pain management   Assess pain using appropriate pain scale   Implement non-pharmacological measures as appropriate and evaluate response   Notify Licensed Independent Practitioner if interventions unsuccessful or patient reports new pain     Problem: Pain  Goal: Verbalizes/displays

## 2024-02-22 NOTE — ED NOTES
Bedside shift change report given to YUDELKA Santos (oncoming nurse) by Pamela JHA (offgoing nurse). Report included the following information Nurse Handoff Report, ED Encounter Summary, ED SBAR, Adult Overview, Intake/Output, MAR, Recent Results, Cardiac Rhythm NSR, and Neuro Assessment. All questions and concerns addressed prior to transfer.

## 2024-02-22 NOTE — PLAN OF CARE
Problem: Pain  Goal: Verbalizes/displays adequate comfort level or baseline comfort level  2/22/2024 1027 by Angy Fitzgerald, RN  Outcome: Not Progressing  2/22/2024 1027 by Angy Fitzgerald, RN  Flowsheets (Taken 2/22/2024 0933)  Verbalizes/displays adequate comfort level or baseline comfort level:   Encourage patient to monitor pain and request assistance   Administer analgesics based on type and severity of pain and evaluate response   Consider cultural and social influences on pain and pain management   Assess pain using appropriate pain scale   Implement non-pharmacological measures as appropriate and evaluate response   Notify Licensed Independent Practitioner if interventions unsuccessful or patient reports new pain  Note: Patient educated on positioning and pain management  2/22/2024 1026 by Angy Fitzgerald, RN  Flowsheets (Taken 2/22/2024 0933)  Verbalizes/displays adequate comfort level or baseline comfort level:   Encourage patient to monitor pain and request assistance   Administer analgesics based on type and severity of pain and evaluate response   Consider cultural and social influences on pain and pain management   Assess pain using appropriate pain scale   Implement non-pharmacological measures as appropriate and evaluate response   Notify Licensed Independent Practitioner if interventions unsuccessful or patient reports new pain  2/22/2024 1025 by Angy Fitzgearld, RN  Outcome: Not Progressing  2/22/2024 1024 by Angy Fitzgerald, RN  Flowsheets (Taken 2/22/2024 0933)  Verbalizes/displays adequate comfort level or baseline comfort level:   Encourage patient to monitor pain and request assistance   Administer analgesics based on type and severity of pain and evaluate response   Consider cultural and social influences on pain and pain management   Assess pain using appropriate pain scale   Implement non-pharmacological measures as appropriate and evaluate response   Notify Licensed Independent Practitioner if

## 2024-02-22 NOTE — ED PROVIDER NOTES
dry.   Neurological:      General: No focal deficit present.      Mental Status: She is alert.      Comments: 5/5 strength with hip flexion/extension, knee flexion/extension, ankle plantar/dorsiflexion. Intact sensation to light touch   Psychiatric:         Mood and Affect: Mood normal.         Behavior: Behavior normal.          DIAGNOSTIC RESULTS   LABS:     Recent Results (from the past 24 hour(s))   POCT Glucose    Collection Time: 02/26/24 11:48 AM   Result Value Ref Range    POC Glucose 165 (H) 65 - 117 mg/dL    Performed by: Yanick Thomson (CON)    POCT Glucose    Collection Time: 02/26/24  4:27 PM   Result Value Ref Range    POC Glucose 260 (H) 65 - 117 mg/dL    Performed by: Anatoly Brewer    POCT Glucose    Collection Time: 02/26/24  4:38 PM   Result Value Ref Range    POC Glucose 206 (H) 65 - 117 mg/dL    Performed by: Yanick Thomson (CON)    POCT Glucose    Collection Time: 02/26/24  8:19 PM   Result Value Ref Range    POC Glucose 288 (H) 65 - 117 mg/dL    Performed by: Liddle McKenzie PCT    Basic Metabolic Panel    Collection Time: 02/27/24  1:37 AM   Result Value Ref Range    Sodium 136 136 - 145 mmol/L    Potassium 4.1 3.5 - 5.1 mmol/L    Chloride 106 97 - 108 mmol/L    CO2 26 21 - 32 mmol/L    Anion Gap 4 (L) 5 - 15 mmol/L    Glucose 253 (H) 65 - 100 mg/dL    BUN 22 (H) 6 - 20 MG/DL    Creatinine 0.85 0.55 - 1.02 MG/DL    Bun/Cre Ratio 26 (H) 12 - 20      Est, Glom Filt Rate >60 >60 ml/min/1.73m2    Calcium 8.6 8.5 - 10.1 MG/DL   CBC with Auto Differential    Collection Time: 02/27/24  1:37 AM   Result Value Ref Range    WBC 7.0 3.6 - 11.0 K/uL    RBC 3.96 3.80 - 5.20 M/uL    Hemoglobin 14.1 11.5 - 16.0 g/dL    Hematocrit 41.5 35.0 - 47.0 %    .8 (H) 80.0 - 99.0 FL    MCH 35.6 (H) 26.0 - 34.0 PG    MCHC 34.0 30.0 - 36.5 g/dL    RDW 12.7 11.5 - 14.5 %    Platelets 201 150 - 400 K/uL    MPV 10.1 8.9 - 12.9 FL    Nucleated RBCs 0.0 0  WBC    nRBC 0.00 0.00 - 0.01 K/uL    Neutrophils % 79 (H)    pantoprazole (PROTONIX) tablet 40 mg (40 mg Oral Given 2/26/24 0927)   rosuvastatin (CRESTOR) tablet 20 mg ( Oral Automatically Held 3/2/24 2100)   lisinopril (PRINIVIL;ZESTRIL) tablet 10 mg (10 mg Oral Given 2/26/24 0932)   montelukast (SINGULAIR) tablet 10 mg (10 mg Oral Given 2/26/24 2001)   predniSONE (DELTASONE) tablet 20 mg (20 mg Oral Given 2/26/24 0928)   primidone (MYSOLINE) tablet 50 mg (50 mg Oral Given 2/26/24 2001)   sodium chloride flush 0.9 % injection 5-40 mL (10 mLs IntraVENous Given 2/26/24 2002)   sodium chloride flush 0.9 % injection 5-40 mL (has no administration in time range)   0.9 % sodium chloride infusion (has no administration in time range)   enoxaparin (LOVENOX) injection 40 mg (40 mg SubCUTAneous Given 2/26/24 0927)   ondansetron (ZOFRAN-ODT) disintegrating tablet 4 mg (has no administration in time range)     Or   ondansetron (ZOFRAN) injection 4 mg (has no administration in time range)   polyethylene glycol (GLYCOLAX) packet 17 g (has no administration in time range)   acetaminophen (TYLENOL) tablet 650 mg (650 mg Oral Given 2/25/24 0212)     Or   acetaminophen (TYLENOL) suppository 650 mg ( Rectal See Alternative 2/25/24 0212)   lidocaine 4 % external patch 1 patch (1 patch TransDERmal Patch Removed 2/26/24 2003)   glucose chewable tablet 16 g (has no administration in time range)   dextrose bolus 10% 125 mL (has no administration in time range)     Or   dextrose bolus 10% 250 mL (has no administration in time range)   glucagon injection 1 mg (has no administration in time range)   dextrose 10 % infusion (has no administration in time range)   insulin lispro (HUMALOG) injection vial 0-8 Units (4 Units SubCUTAneous Given 2/26/24 1648)   insulin lispro (HUMALOG) injection vial 0-4 Units ( SubCUTAneous Not Given 2/26/24 2050)   calcium carbonate (TUMS) chewable tablet 1,000 mg (1,000 mg Oral Given 2/23/24 1141)   balsum peru-castor oil (VENELEX) ointment ( Topical Given 2/26/24 2053)

## 2024-02-23 LAB
ANION GAP SERPL CALC-SCNC: 6 MMOL/L (ref 5–15)
BUN SERPL-MCNC: 23 MG/DL (ref 6–20)
BUN/CREAT SERPL: 37 (ref 12–20)
CALCIUM SERPL-MCNC: 9.4 MG/DL (ref 8.5–10.1)
CHLORIDE SERPL-SCNC: 106 MMOL/L (ref 97–108)
CO2 SERPL-SCNC: 28 MMOL/L (ref 21–32)
CREAT SERPL-MCNC: 0.62 MG/DL (ref 0.55–1.02)
GLUCOSE BLD STRIP.AUTO-MCNC: 165 MG/DL (ref 65–117)
GLUCOSE BLD STRIP.AUTO-MCNC: 244 MG/DL (ref 65–117)
GLUCOSE BLD STRIP.AUTO-MCNC: 289 MG/DL (ref 65–117)
GLUCOSE BLD STRIP.AUTO-MCNC: 352 MG/DL (ref 65–117)
GLUCOSE SERPL-MCNC: 179 MG/DL (ref 65–100)
POTASSIUM SERPL-SCNC: 4.2 MMOL/L (ref 3.5–5.1)
SERVICE CMNT-IMP: ABNORMAL
SODIUM SERPL-SCNC: 140 MMOL/L (ref 136–145)

## 2024-02-23 PROCEDURE — 97161 PT EVAL LOW COMPLEX 20 MIN: CPT

## 2024-02-23 PROCEDURE — 6370000000 HC RX 637 (ALT 250 FOR IP): Performed by: FAMILY MEDICINE

## 2024-02-23 PROCEDURE — 6370000000 HC RX 637 (ALT 250 FOR IP): Performed by: INTERNAL MEDICINE

## 2024-02-23 PROCEDURE — 2580000003 HC RX 258: Performed by: STUDENT IN AN ORGANIZED HEALTH CARE EDUCATION/TRAINING PROGRAM

## 2024-02-23 PROCEDURE — 99232 SBSQ HOSP IP/OBS MODERATE 35: CPT | Performed by: FAMILY MEDICINE

## 2024-02-23 PROCEDURE — 36415 COLL VENOUS BLD VENIPUNCTURE: CPT

## 2024-02-23 PROCEDURE — 82962 GLUCOSE BLOOD TEST: CPT

## 2024-02-23 PROCEDURE — 96366 THER/PROPH/DIAG IV INF ADDON: CPT

## 2024-02-23 PROCEDURE — 80048 BASIC METABOLIC PNL TOTAL CA: CPT

## 2024-02-23 PROCEDURE — G0378 HOSPITAL OBSERVATION PER HR: HCPCS

## 2024-02-23 PROCEDURE — 2580000003 HC RX 258: Performed by: INTERNAL MEDICINE

## 2024-02-23 PROCEDURE — 97530 THERAPEUTIC ACTIVITIES: CPT

## 2024-02-23 PROCEDURE — 6360000002 HC RX W HCPCS: Performed by: INTERNAL MEDICINE

## 2024-02-23 PROCEDURE — 96372 THER/PROPH/DIAG INJ SC/IM: CPT

## 2024-02-23 PROCEDURE — 6360000002 HC RX W HCPCS: Performed by: STUDENT IN AN ORGANIZED HEALTH CARE EDUCATION/TRAINING PROGRAM

## 2024-02-23 RX ORDER — CASTOR OIL AND BALSAM, PERU 788; 87 MG/G; MG/G
OINTMENT TOPICAL 2 TIMES DAILY
Status: DISCONTINUED | OUTPATIENT
Start: 2024-02-23 | End: 2024-03-04 | Stop reason: HOSPADM

## 2024-02-23 RX ORDER — CALCIUM CARBONATE 500 MG/1
1000 TABLET, CHEWABLE ORAL 4 TIMES DAILY PRN
Status: DISPENSED | OUTPATIENT
Start: 2024-02-23 | End: 2024-03-04

## 2024-02-23 RX ORDER — INSULIN GLARGINE 100 [IU]/ML
24 INJECTION, SOLUTION SUBCUTANEOUS DAILY
Status: DISCONTINUED | OUTPATIENT
Start: 2024-02-23 | End: 2024-02-24

## 2024-02-23 RX ADMIN — CARVEDILOL 12.5 MG: 12.5 TABLET, FILM COATED ORAL at 08:37

## 2024-02-23 RX ADMIN — SODIUM CHLORIDE, PRESERVATIVE FREE 10 ML: 5 INJECTION INTRAVENOUS at 08:46

## 2024-02-23 RX ADMIN — ENOXAPARIN SODIUM 40 MG: 100 INJECTION SUBCUTANEOUS at 08:36

## 2024-02-23 RX ADMIN — CARVEDILOL 12.5 MG: 12.5 TABLET, FILM COATED ORAL at 21:21

## 2024-02-23 RX ADMIN — Medication: at 21:38

## 2024-02-23 RX ADMIN — PANTOPRAZOLE SODIUM 40 MG: 40 TABLET, DELAYED RELEASE ORAL at 08:37

## 2024-02-23 RX ADMIN — CALCIUM CARBONATE (ANTACID) CHEW TAB 500 MG 1000 MG: 500 CHEW TAB at 11:41

## 2024-02-23 RX ADMIN — ACETAMINOPHEN 650 MG: 325 TABLET ORAL at 05:59

## 2024-02-23 RX ADMIN — MONTELUKAST 10 MG: 10 TABLET, FILM COATED ORAL at 21:21

## 2024-02-23 RX ADMIN — PRIMIDONE 50 MG: 50 TABLET ORAL at 21:21

## 2024-02-23 RX ADMIN — CLONAZEPAM 1 MG: 1 TABLET ORAL at 08:37

## 2024-02-23 RX ADMIN — PREDNISONE 20 MG: 20 TABLET ORAL at 08:37

## 2024-02-23 RX ADMIN — Medication 2 G: at 21:24

## 2024-02-23 RX ADMIN — Medication 2 G: at 04:06

## 2024-02-23 RX ADMIN — MEROPENEM 1000 MG: 1 INJECTION, POWDER, FOR SOLUTION INTRAVENOUS at 21:18

## 2024-02-23 RX ADMIN — LISINOPRIL 10 MG: 5 TABLET ORAL at 08:37

## 2024-02-23 RX ADMIN — SODIUM CHLORIDE, PRESERVATIVE FREE 10 ML: 5 INJECTION INTRAVENOUS at 21:22

## 2024-02-23 RX ADMIN — MEROPENEM 1000 MG: 1 INJECTION, POWDER, FOR SOLUTION INTRAVENOUS at 05:38

## 2024-02-23 RX ADMIN — Medication 2 G: at 13:45

## 2024-02-23 RX ADMIN — INSULIN LISPRO 2 UNITS: 100 INJECTION, SOLUTION INTRAVENOUS; SUBCUTANEOUS at 11:41

## 2024-02-23 RX ADMIN — INSULIN LISPRO 8 UNITS: 100 INJECTION, SOLUTION INTRAVENOUS; SUBCUTANEOUS at 17:38

## 2024-02-23 RX ADMIN — PRIMIDONE 50 MG: 50 TABLET ORAL at 08:37

## 2024-02-23 RX ADMIN — MEROPENEM 1000 MG: 1 INJECTION, POWDER, FOR SOLUTION INTRAVENOUS at 14:08

## 2024-02-23 RX ADMIN — INSULIN GLARGINE 24 UNITS: 100 INJECTION, SOLUTION SUBCUTANEOUS at 08:36

## 2024-02-23 RX ADMIN — Medication 2 G: at 08:37

## 2024-02-23 RX ADMIN — ACETAMINOPHEN 650 MG: 325 TABLET ORAL at 11:41

## 2024-02-23 ASSESSMENT — PAIN DESCRIPTION - DESCRIPTORS: DESCRIPTORS: SHARP;TIGHTNESS

## 2024-02-23 ASSESSMENT — PAIN SCALES - GENERAL
PAINLEVEL_OUTOF10: 10
PAINLEVEL_OUTOF10: 5
PAINLEVEL_OUTOF10: 0

## 2024-02-23 ASSESSMENT — PAIN DESCRIPTION - LOCATION
LOCATION: ABDOMEN
LOCATION: ABDOMEN

## 2024-02-23 ASSESSMENT — PAIN DESCRIPTION - ORIENTATION
ORIENTATION: LEFT
ORIENTATION: LEFT;UPPER

## 2024-02-23 ASSESSMENT — PAIN SCALES - WONG BAKER
WONGBAKER_NUMERICALRESPONSE: 0

## 2024-02-23 NOTE — PLAN OF CARE
Vision/Perceptual:                  Strength:    Strength: Generally decreased, functional    Tone & Sensation:      Sensation:  (intact to LT, pt reprots B peripheral neuropathy, c/o sharp/burning pain from waist to toes, worsened with ankle PF)    Coordination:       Range Of Motion:  AROM: Generally decreased, functional  PROM: Generally decreased, functional    Functional Mobility:  Bed Mobility:     Bed Mobility Training  Bed Mobility Training: Yes  Interventions: Verbal cues;Tactile cues  Rolling: Minimum assistance  Supine to Sit: Maximum assistance;Assist X1  Sit to Supine: Maximum assistance;Assist X1  Scooting: Maximum assistance;Assist X2 (for lateral scooting toward HOB)  Transfers:     Transfer Training  Transfer Training: No  Balance:               Balance  Sitting: With support  Standing:  (not assessed 2/2 BLE weakness (unable to achieve full knee extension or anti-gravity hip flexion))  Ambulation/Gait Training:                                                                                                                                                                                                                                                                                  [facility] AM-PAC®      Basic Mobility Inpatient Short Form (6-Clicks) Version 2  How much HELP from another person do you currently need... (If the patient hasn't done an activity recently, how much help from another person do you think they would need if they tried?) Total A Lot A Little None   1.  Turning from your back to your side while in a flat bed without using bedrails? []  1 []  2 [x]  3  []  4   2.  Moving from lying on your back to sitting on the side of a flat bed without using bedrails? []  1 [x]  2 []  3  []  4   3.  Moving to and from a bed to a chair (including a wheelchair)? [x]  1 []  2 []  3  []  4   4. Standing up from a chair using your arms (e.g. wheelchair or bedside chair)? [x]  1 []  2  []  3  []  4   5.  Walking in hospital room? [x]  1 []  2 []  3  []  4   6.  Climbing 3-5 steps with a railing? [x]  1 []  2 []  3  []  4     Raw Score:                             Cutoff score ?171,2,3 had higher odds of discharging home with home health or need of SNF/IPR.    1. Yazmin Vasquez, Sara Verma, Miah Briggs, Lauren Villasenor, Dany Perez, Altaf Vasquez.  Validity of the AM-PAC “6-Clicks” Inpatient Daily Activity and Basic Mobility Short Forms. Physical Therapy Mar 2014, [phone]; DOI: 10.2522/ptj.23981594  2. Julian KOENIG, Natacha MRAINELLI, Donny MARINELLI, Joy MARINELLI. Association of AM-PAC \"6-Clicks\" Basic Mobility and Daily Activity Scores With Discharge Destination. Phys Ther. 2021 4;101(4):wxcg071. doi: 10.1093/ptj/kzjf125. PMID: 68108613.  3. Xochilt J, Fiordaliza D, Pura S, Robyn K, Jomar S. Activity Measure for Post-Acute Care \"6-Clicks\" Basic Mobility Scores Predict Discharge Destination After Acute Care Hospitalization in Select Patient Groups: A Retrospective, Observational Study. Arch Rehabil Res Clin Transl. 2022 16;4(3):113959. doi: 10.1016/j.arrct..373051. PMID: 03899281; PMCID: [id number].  4. Christina ANGEL, Magaly S, Radha W, Alexandra P. AM-PAC Short Forms Manual 4.0. Revised 2020.                                                                                                                                                                                                                              Pain Ratin/10   Pain Intervention(s):   nursing notified and addressing, patient medicated for pain prior to session, repositioning, and pain is at a level acceptable to the patient    Activity Tolerance:   Good    After treatment:   Patient left in no apparent distress in bed, Call bell within reach, and Bed/ chair alarm activated    COMMUNICATION/EDUCATION:   The patient's plan of care was discussed with: registered nurse    Patient Education  Education Given To:

## 2024-02-23 NOTE — CARE COORDINATION
Care Management Initial Assessment       RUR:n/a  Readmission? No  1st IM letter given? No  1st  letter given: No         02/23/24 0902   Service Assessment   Patient Orientation Unable to Assess   History Provided By Child/Family   Primary Caregiver Family   Support Systems Children  (son)   Patient's Healthcare Decision Maker is: Named in Scanned ACP Document   PCP Verified by CM Yes  (Dr. Irvin Vang)   Last Visit to PCP Within last 3 months  (last Thursday)   Prior Functional Level Assistance with the following:;Bathing;Dressing;Toileting;Mobility   Current Functional Level Assistance with the following:;Bathing;Dressing;Toileting;Mobility   Can patient return to prior living arrangement Yes   Family able to assist with home care needs: [facility] (comment)  [facility]   Financial Resources Medicare   Community Resources Assisted Living   Social/Functional History   Type of Home Assisted living   Home Equipment Wheelchair-manual   Active  No   Discharge Planning   Type of Residence Assisted living   Condition of Participation: Discharge Planning   The Patient and/or Patient Representative was provided with a Choice of Provider? Patient Representative   Name of the Patient Representative who was provided with the Choice of Provider and agrees with the Discharge Plan?  Jimi Verdin   The Patient and/Or Patient Representative agree with the Discharge Plan? Yes   Freedom of Choice list was provided with basic dialogue that supports the patient's individualized plan of care/goals, treatment preferences, and shares the quality data associated with the providers?  Yes       CM spoke with pt's son via phone to introduce self/role, verify demographics and complete initial assessment.  Pt lives at the [facility] in room 319.  Pt has a hx of HH but son could not recall company.  Pt does not have a hx of SNF or IPR.  Pt uses a w/c.  Pt needs assistance with ADLs.  Pt saw her  PCP, Dr. Vang, last week per son.  Son reported BLS will need to be arranged at d/c.      Gretel Galvez LMSW  Supervisee in Social Work  Care Management, [facility]  [phone]

## 2024-02-23 NOTE — PROGRESS NOTES
Admit Date: 2/21/2024  Hospital day 2    Subjective:     Patient has no complaint of fever, chills. In general feels weak, mild nausea and anorexia. Admitted for an ESBL Klebsiella UTI. Is also currently in a memory Care unit that she doesn't think she needs to be in. Also has peripheral neuropathy, would like some PT for this. ..   Medication side effects: none    Scheduled Meds:   carvedilol  12.5 mg Oral BID    clonazePAM  1 mg Oral Daily    insulin glargine  20 Units SubCUTAneous Daily    pantoprazole  40 mg Oral Daily    [Held by provider] rosuvastatin  20 mg Oral Nightly    lisinopril  10 mg Oral Daily    montelukast  10 mg Oral Nightly    predniSONE  20 mg Oral Daily    primidone  50 mg Oral BID    sodium chloride flush  5-40 mL IntraVENous 2 times per day    enoxaparin  40 mg SubCUTAneous Daily    lidocaine  1 patch TransDERmal Daily    insulin lispro  0-8 Units SubCUTAneous TID WC    insulin lispro  0-4 Units SubCUTAneous Nightly    meropenem  1,000 mg IntraVENous Q8H     Continuous Infusions:   sodium chloride      dextrose       PRN Meds:sodium chloride flush, sodium chloride, ondansetron **OR** ondansetron, polyethylene glycol, acetaminophen **OR** acetaminophen, diclofenac sodium, glucose, dextrose bolus **OR** dextrose bolus, glucagon (rDNA), dextrose    Review of Systems  Pertinent items are noted in HPI.    Objective:     Patient Vitals for the past 8 hrs:   BP Temp Temp src Pulse Resp SpO2   02/23/24 0324 113/62 98.1 °F (36.7 °C) Oral 67 16 98 %   02/22/24 2307 109/75 98.1 °F (36.7 °C) Oral 73 20 99 %     I/O last 3 completed shifts:  In: 1180 [P.O.:1080; IV Piggyback:100]  Out: 400 [Urine:400]  No intake/output data recorded.    /62   Pulse 67   Temp 98.1 °F (36.7 °C) (Oral)   Resp 16   Ht 1.6 m (5' 3\")   Wt 81.6 kg (180 lb)   SpO2 98%   BMI 31.89 kg/m²   Lungs: clear to auscultation bilaterally  Heart: regular rate and rhythm, S1, S2 normal, no murmur, click, rub or gallop  Abdomen:  soft, non-tender; bowel sounds normal; no masses,  no organomegaly  Extremities: edema , none    ECG: normal sinus rhythm, no blocks or conduction defects, no ischemic changes.   Data ReviewCBC:   Lab Results   Component Value Date/Time    WBC 8.5 02/21/2024 08:04 PM    RBC 4.39 02/21/2024 08:04 PM     BMP:   Lab Results   Component Value Date/Time    GLUCOSE 179 02/23/2024 03:25 AM    CO2 28 02/23/2024 03:25 AM    BUN 23 02/23/2024 03:25 AM    CREATININE 0.62 02/23/2024 03:25 AM    CALCIUM 9.4 02/23/2024 03:25 AM       Assessment:     Principal Problem:    UTI due to extended-spectrum beta lactamase (ESBL) producing Escherichia coli  Resolved Problems:    * No resolved hospital problems. *      Plan:     ESBL Klebsiella UTI- continue iv meropenem based on C and S   Peripheral neuropathy- will have PT see  ? Of dementia? On a memory care unit- seems very with it cognitively at the present time. Pt says that had an episode of severe hypoglycemia last year, and has been in an assisted living situation since then.   Diabetes- on Lantus 20 units daily and sliding scale. Will increase lantus to 25 units as sugars somewhat high  ON prednisone for PMR

## 2024-02-23 NOTE — PLAN OF CARE
Patient alert and oriented x 4, incontinent care done as needed and repositioned.    Problem: Safety - Adult  Goal: Free from fall injury  2/23/2024 0656 by Tucker Santos, RN  Outcome: Progressing  2/22/2024 1959 by Angy Fitzgerald, RN  Outcome: Progressing     Problem: Musculoskeletal - Adult  Goal: Return mobility to safest level of function  Outcome: Progressing     Problem: Pain  Goal: Verbalizes/displays adequate comfort level or baseline comfort level  2/23/2024 0656 by Tucker Santos, RN  Outcome: Progressing  2/22/2024 1959 by Angy Fitzgerald, RN  Outcome: Not Progressing

## 2024-02-23 NOTE — PLAN OF CARE
Problem: Pain  Goal: Verbalizes/displays adequate comfort level or baseline comfort level  2/22/2024 1959 by Angy Fitzgerald, RN  Outcome: Not Progressing  2/22/2024 1027 by Angy Fitzgerald RN  Outcome: Not Progressing  2/22/2024 1027 by Angy Fitzgerald, RN  Flowsheets (Taken 2/22/2024 0933)  Verbalizes/displays adequate comfort level or baseline comfort level:   Encourage patient to monitor pain and request assistance   Administer analgesics based on type and severity of pain and evaluate response   Consider cultural and social influences on pain and pain management   Assess pain using appropriate pain scale   Implement non-pharmacological measures as appropriate and evaluate response   Notify Licensed Independent Practitioner if interventions unsuccessful or patient reports new pain  Note: Patient educated on positioning and pain management  2/22/2024 1026 by Angy Fitzgerald, RN  Flowsheets (Taken 2/22/2024 0933)  Verbalizes/displays adequate comfort level or baseline comfort level:   Encourage patient to monitor pain and request assistance   Administer analgesics based on type and severity of pain and evaluate response   Consider cultural and social influences on pain and pain management   Assess pain using appropriate pain scale   Implement non-pharmacological measures as appropriate and evaluate response   Notify Licensed Independent Practitioner if interventions unsuccessful or patient reports new pain  2/22/2024 1025 by Angy Fitzgerald, RN  Outcome: Not Progressing  2/22/2024 1024 by Angy Fitzgerald, RN  Flowsheets (Taken 2/22/2024 0933)  Verbalizes/displays adequate comfort level or baseline comfort level:   Encourage patient to monitor pain and request assistance   Administer analgesics based on type and severity of pain and evaluate response   Consider cultural and social influences on pain and pain management   Assess pain using appropriate pain scale   Implement non-pharmacological measures as appropriate and evaluate

## 2024-02-24 LAB
ANION GAP SERPL CALC-SCNC: 6 MMOL/L (ref 5–15)
BACTERIA SPEC CULT: ABNORMAL
BUN SERPL-MCNC: 22 MG/DL (ref 6–20)
BUN/CREAT SERPL: 32 (ref 12–20)
CALCIUM SERPL-MCNC: 9.1 MG/DL (ref 8.5–10.1)
CC UR VC: ABNORMAL
CHLORIDE SERPL-SCNC: 108 MMOL/L (ref 97–108)
CO2 SERPL-SCNC: 26 MMOL/L (ref 21–32)
CREAT SERPL-MCNC: 0.69 MG/DL (ref 0.55–1.02)
GLUCOSE BLD STRIP.AUTO-MCNC: 175 MG/DL (ref 65–117)
GLUCOSE BLD STRIP.AUTO-MCNC: 220 MG/DL (ref 65–117)
GLUCOSE BLD STRIP.AUTO-MCNC: 288 MG/DL (ref 65–117)
GLUCOSE BLD STRIP.AUTO-MCNC: 318 MG/DL (ref 65–117)
GLUCOSE SERPL-MCNC: 179 MG/DL (ref 65–100)
POTASSIUM SERPL-SCNC: 3.8 MMOL/L (ref 3.5–5.1)
SERVICE CMNT-IMP: ABNORMAL
SODIUM SERPL-SCNC: 140 MMOL/L (ref 136–145)

## 2024-02-24 PROCEDURE — 96366 THER/PROPH/DIAG IV INF ADDON: CPT

## 2024-02-24 PROCEDURE — 99232 SBSQ HOSP IP/OBS MODERATE 35: CPT | Performed by: FAMILY MEDICINE

## 2024-02-24 PROCEDURE — 6370000000 HC RX 637 (ALT 250 FOR IP): Performed by: FAMILY MEDICINE

## 2024-02-24 PROCEDURE — 6370000000 HC RX 637 (ALT 250 FOR IP): Performed by: INTERNAL MEDICINE

## 2024-02-24 PROCEDURE — 82962 GLUCOSE BLOOD TEST: CPT

## 2024-02-24 PROCEDURE — 36415 COLL VENOUS BLD VENIPUNCTURE: CPT

## 2024-02-24 PROCEDURE — G0378 HOSPITAL OBSERVATION PER HR: HCPCS

## 2024-02-24 PROCEDURE — 87086 URINE CULTURE/COLONY COUNT: CPT

## 2024-02-24 PROCEDURE — 6360000002 HC RX W HCPCS: Performed by: INTERNAL MEDICINE

## 2024-02-24 PROCEDURE — 2580000003 HC RX 258: Performed by: STUDENT IN AN ORGANIZED HEALTH CARE EDUCATION/TRAINING PROGRAM

## 2024-02-24 PROCEDURE — 2580000003 HC RX 258: Performed by: INTERNAL MEDICINE

## 2024-02-24 PROCEDURE — 96372 THER/PROPH/DIAG INJ SC/IM: CPT

## 2024-02-24 PROCEDURE — 6360000002 HC RX W HCPCS: Performed by: STUDENT IN AN ORGANIZED HEALTH CARE EDUCATION/TRAINING PROGRAM

## 2024-02-24 PROCEDURE — 80048 BASIC METABOLIC PNL TOTAL CA: CPT

## 2024-02-24 RX ORDER — INSULIN GLARGINE 100 [IU]/ML
26 INJECTION, SOLUTION SUBCUTANEOUS DAILY
Status: DISCONTINUED | OUTPATIENT
Start: 2024-02-24 | End: 2024-02-29

## 2024-02-24 RX ORDER — POLYETHYLENE GLYCOL 3350 17 G/17G
17 POWDER, FOR SOLUTION ORAL DAILY
Status: DISCONTINUED | OUTPATIENT
Start: 2024-02-24 | End: 2024-02-27

## 2024-02-24 RX ORDER — OXYCODONE HYDROCHLORIDE AND ACETAMINOPHEN 5; 325 MG/1; MG/1
1 TABLET ORAL EVERY 12 HOURS
Status: DISCONTINUED | OUTPATIENT
Start: 2024-02-24 | End: 2024-03-04 | Stop reason: HOSPADM

## 2024-02-24 RX ADMIN — ENOXAPARIN SODIUM 40 MG: 100 INJECTION SUBCUTANEOUS at 08:26

## 2024-02-24 RX ADMIN — POLYETHYLENE GLYCOL 3350 17 G: 17 POWDER, FOR SOLUTION ORAL at 08:28

## 2024-02-24 RX ADMIN — PREDNISONE 20 MG: 20 TABLET ORAL at 08:26

## 2024-02-24 RX ADMIN — OXYCODONE HYDROCHLORIDE AND ACETAMINOPHEN 1 TABLET: 5; 325 TABLET ORAL at 08:25

## 2024-02-24 RX ADMIN — LISINOPRIL 10 MG: 5 TABLET ORAL at 08:26

## 2024-02-24 RX ADMIN — INSULIN LISPRO 6 UNITS: 100 INJECTION, SOLUTION INTRAVENOUS; SUBCUTANEOUS at 17:30

## 2024-02-24 RX ADMIN — DICLOFENAC SODIUM 2 G: 10 GEL TOPICAL at 20:51

## 2024-02-24 RX ADMIN — INSULIN LISPRO 2 UNITS: 100 INJECTION, SOLUTION INTRAVENOUS; SUBCUTANEOUS at 12:58

## 2024-02-24 RX ADMIN — MONTELUKAST 10 MG: 10 TABLET, FILM COATED ORAL at 20:49

## 2024-02-24 RX ADMIN — ACETAMINOPHEN 650 MG: 325 TABLET ORAL at 05:19

## 2024-02-24 RX ADMIN — MEROPENEM 1000 MG: 1 INJECTION, POWDER, FOR SOLUTION INTRAVENOUS at 05:30

## 2024-02-24 RX ADMIN — OXYCODONE HYDROCHLORIDE AND ACETAMINOPHEN 1 TABLET: 5; 325 TABLET ORAL at 20:49

## 2024-02-24 RX ADMIN — CLONAZEPAM 1 MG: 1 TABLET ORAL at 08:26

## 2024-02-24 RX ADMIN — SODIUM CHLORIDE, PRESERVATIVE FREE 10 ML: 5 INJECTION INTRAVENOUS at 21:10

## 2024-02-24 RX ADMIN — DICLOFENAC SODIUM 2 G: 10 GEL TOPICAL at 08:27

## 2024-02-24 RX ADMIN — MEROPENEM 1000 MG: 1 INJECTION, POWDER, FOR SOLUTION INTRAVENOUS at 20:57

## 2024-02-24 RX ADMIN — INSULIN GLARGINE 26 UNITS: 100 INJECTION, SOLUTION SUBCUTANEOUS at 08:26

## 2024-02-24 RX ADMIN — Medication: at 08:27

## 2024-02-24 RX ADMIN — SODIUM CHLORIDE, PRESERVATIVE FREE 10 ML: 5 INJECTION INTRAVENOUS at 08:27

## 2024-02-24 RX ADMIN — Medication: at 20:52

## 2024-02-24 RX ADMIN — PANTOPRAZOLE SODIUM 40 MG: 40 TABLET, DELAYED RELEASE ORAL at 08:26

## 2024-02-24 RX ADMIN — PRIMIDONE 50 MG: 50 TABLET ORAL at 11:18

## 2024-02-24 RX ADMIN — DICLOFENAC SODIUM 2 G: 10 GEL TOPICAL at 12:58

## 2024-02-24 RX ADMIN — MEROPENEM 1000 MG: 1 INJECTION, POWDER, FOR SOLUTION INTRAVENOUS at 12:59

## 2024-02-24 RX ADMIN — DICLOFENAC SODIUM 2 G: 10 GEL TOPICAL at 17:30

## 2024-02-24 RX ADMIN — CARVEDILOL 12.5 MG: 12.5 TABLET, FILM COATED ORAL at 20:49

## 2024-02-24 RX ADMIN — ACETAMINOPHEN 650 MG: 325 TABLET ORAL at 00:45

## 2024-02-24 RX ADMIN — PRIMIDONE 50 MG: 50 TABLET ORAL at 21:59

## 2024-02-24 ASSESSMENT — PAIN DESCRIPTION - DESCRIPTORS
DESCRIPTORS: CRAMPING
DESCRIPTORS: PINS AND NEEDLES
DESCRIPTORS: CRAMPING
DESCRIPTORS: SORE

## 2024-02-24 ASSESSMENT — PAIN DESCRIPTION - LOCATION
LOCATION: ABDOMEN
LOCATION: LEG
LOCATION: ABDOMEN
LOCATION: BUTTOCKS

## 2024-02-24 ASSESSMENT — PAIN DESCRIPTION - PAIN TYPE
TYPE: CHRONIC PAIN
TYPE: ACUTE PAIN

## 2024-02-24 ASSESSMENT — PAIN DESCRIPTION - ORIENTATION
ORIENTATION: RIGHT;LEFT
ORIENTATION: MID
ORIENTATION: MID

## 2024-02-24 ASSESSMENT — PAIN - FUNCTIONAL ASSESSMENT
PAIN_FUNCTIONAL_ASSESSMENT: ACTIVITIES ARE NOT PREVENTED
PAIN_FUNCTIONAL_ASSESSMENT: ACTIVITIES ARE NOT PREVENTED
PAIN_FUNCTIONAL_ASSESSMENT: PREVENTS OR INTERFERES SOME ACTIVE ACTIVITIES AND ADLS

## 2024-02-24 ASSESSMENT — PAIN SCALES - WONG BAKER
WONGBAKER_NUMERICALRESPONSE: 0

## 2024-02-24 ASSESSMENT — PAIN SCALES - GENERAL
PAINLEVEL_OUTOF10: 2
PAINLEVEL_OUTOF10: 8
PAINLEVEL_OUTOF10: 5
PAINLEVEL_OUTOF10: 7
PAINLEVEL_OUTOF10: 0

## 2024-02-24 NOTE — PROGRESS NOTES
Admit Date: 2/21/2024  Hospital day 4    Subjective:     Patient has no complaint of fever, chills. Co diffuse pain- legs, abdomen, back. Had PT see her yesterday, unable to stand and walk. Pt says that she needs assistance of 2 people to stand, her assisted living facility has trouble providing this level of care. ..   Medication side effects: none    Scheduled Meds:   diclofenac sodium  2 g Topical 4x daily    insulin glargine  26 Units SubCUTAneous Daily    oxyCODONE-acetaminophen  1 tablet Oral Q12H    balsum peru-castor oil   Topical BID    carvedilol  12.5 mg Oral BID    clonazePAM  1 mg Oral Daily    pantoprazole  40 mg Oral Daily    [Held by provider] rosuvastatin  20 mg Oral Nightly    lisinopril  10 mg Oral Daily    montelukast  10 mg Oral Nightly    predniSONE  20 mg Oral Daily    primidone  50 mg Oral BID    sodium chloride flush  5-40 mL IntraVENous 2 times per day    enoxaparin  40 mg SubCUTAneous Daily    lidocaine  1 patch TransDERmal Daily    insulin lispro  0-8 Units SubCUTAneous TID WC    insulin lispro  0-4 Units SubCUTAneous Nightly    meropenem  1,000 mg IntraVENous Q8H     Continuous Infusions:   sodium chloride      dextrose       PRN Meds:calcium carbonate, sodium chloride flush, sodium chloride, ondansetron **OR** ondansetron, polyethylene glycol, acetaminophen **OR** acetaminophen, glucose, dextrose bolus **OR** dextrose bolus, glucagon (rDNA), dextrose    Review of Systems  Pertinent items are noted in HPI.    Objective:     Patient Vitals for the past 8 hrs:   BP Temp Temp src Pulse Resp SpO2   02/24/24 0713 130/80 97.3 °F (36.3 °C) Oral 60 18 100 %   02/24/24 0400 (!) 144/86 98.2 °F (36.8 °C) Oral 61 20 97 %   02/24/24 0045 -- -- -- -- -- 97 %   02/24/24 0020 119/71 97.5 °F (36.4 °C) Oral 71 18 --     I/O last 3 completed shifts:  In: -   Out: 200 [Urine:200]  No intake/output data recorded.    /80   Pulse 60   Temp 97.3 °F (36.3 °C) (Oral)   Resp 18   Ht 1.6 m (5' 3\")   Wt  81.6 kg (180 lb)   SpO2 100%   BMI 31.89 kg/m²   Lungs: clear to auscultation bilaterally  Heart: regular rate and rhythm, S1, S2 normal, no murmur, click, rub or gallop  Abdomen: soft, non-tender; bowel sounds normal; no masses,  no organomegaly  Extremities:  no edema- can plantar flex feet, somewhat weak.     ECG: normal sinus rhythm, no blocks or conduction defects, no ischemic changes.   Data ReviewCBC:   Lab Results   Component Value Date/Time    WBC 8.5 02/21/2024 08:04 PM    RBC 4.39 02/21/2024 08:04 PM     BMP:   Lab Results   Component Value Date/Time    GLUCOSE 179 02/24/2024 03:51 AM    CO2 26 02/24/2024 03:51 AM    BUN 22 02/24/2024 03:51 AM    CREATININE 0.69 02/24/2024 03:51 AM    CALCIUM 9.1 02/24/2024 03:51 AM       Assessment:     Principal Problem:    UTI due to extended-spectrum beta lactamase (ESBL) producing Escherichia coli  Resolved Problems:    * No resolved hospital problems. *      Plan:     ESBL Klebsiella UTI- continue iv meropenem based on C and S . Will repeat Urine culture.   Peripheral neuropathy- will have PT see- note that pt was unable to walk  ? Of dementia? On a memory care unit- seems very with it cognitively at the present time. Pt says that had an episode of severe hypoglycemia last year, and has been in an assisted living situation since then.   Diabetes- on Lantus 20 units daily and sliding scale. Will increase lantus to 26 units as sugars somewhat high  ON prednisone for PMR  Chronic pain. Will make voltaren gel qid and start percocet 5/325 bid.

## 2024-02-24 NOTE — PLAN OF CARE
Problem: Pain  Goal: Verbalizes/displays adequate comfort level or baseline comfort level  Outcome: Progressing     Problem: ABCDS Injury Assessment  Goal: Absence of physical injury  Outcome: Progressing     Problem: Safety - Adult  Goal: Free from fall injury  Outcome: Progressing     Problem: Skin/Tissue Integrity  Goal: Absence of new skin breakdown  Description: 1.  Monitor for areas of redness and/or skin breakdown  2.  Assess vascular access sites hourly  3.  Every 4-6 hours minimum:  Change oxygen saturation probe site  4.  Every 4-6 hours:  If on nasal continuous positive airway pressure, respiratory therapy assess nares and determine need for appliance change or resting period.  Outcome: Progressing     Problem: Neurosensory - Adult  Goal: Achieves stable or improved neurological status  Outcome: Progressing     Problem: Physical Therapy - Adult  Goal: By Discharge: Performs mobility at highest level of function for planned discharge setting.  See evaluation for individualized goals.  Description: FUNCTIONAL STATUS PRIOR TO ADMISSION: Pt reports she was bedbound, required 2-person assist for transfer to chair, reports that she has not ambulated since May. Had [facility] PT/[facility] at [facility], but has not done so recently as therapist stated she had plateaued.     HOME SUPPORT PRIOR TO ADMISSION: Pt admitted from assisted living facility, had assist for all aspects of mobility     Physical Therapy Goals  Initiated 2/23/2024  1.  Patient will move from supine to sit and sit to supine, scoot up and down, and roll side to side in bed with minimal assistance within 7 day(s).    2.  Patient will maintain unsupported sitting at EOB x10 minutes with supervision within 7 day(s).   3.  Patient will be independent with home exercise program within 7 day(s).   4.  Patient will transfer from bed to chair and chair to bed with maximal assistance using the least restrictive device within 7 day(s).  2/23/2024 1516 by Margaret Joyce,

## 2024-02-25 LAB
ANION GAP SERPL CALC-SCNC: 6 MMOL/L (ref 5–15)
BACTERIA SPEC CULT: ABNORMAL
BACTERIA SPEC CULT: ABNORMAL
BUN SERPL-MCNC: 23 MG/DL (ref 6–20)
BUN/CREAT SERPL: 41 (ref 12–20)
CALCIUM SERPL-MCNC: 9 MG/DL (ref 8.5–10.1)
CC UR VC: ABNORMAL
CHLORIDE SERPL-SCNC: 108 MMOL/L (ref 97–108)
CO2 SERPL-SCNC: 25 MMOL/L (ref 21–32)
CREAT SERPL-MCNC: 0.56 MG/DL (ref 0.55–1.02)
GLUCOSE BLD STRIP.AUTO-MCNC: 187 MG/DL (ref 65–117)
GLUCOSE BLD STRIP.AUTO-MCNC: 203 MG/DL (ref 65–117)
GLUCOSE BLD STRIP.AUTO-MCNC: 216 MG/DL (ref 65–117)
GLUCOSE BLD STRIP.AUTO-MCNC: 223 MG/DL (ref 65–117)
GLUCOSE BLD STRIP.AUTO-MCNC: 286 MG/DL (ref 65–117)
GLUCOSE SERPL-MCNC: 238 MG/DL (ref 65–100)
POTASSIUM SERPL-SCNC: 3.8 MMOL/L (ref 3.5–5.1)
SERVICE CMNT-IMP: ABNORMAL
SODIUM SERPL-SCNC: 139 MMOL/L (ref 136–145)

## 2024-02-25 PROCEDURE — 80048 BASIC METABOLIC PNL TOTAL CA: CPT

## 2024-02-25 PROCEDURE — 96372 THER/PROPH/DIAG INJ SC/IM: CPT

## 2024-02-25 PROCEDURE — 6360000002 HC RX W HCPCS: Performed by: INTERNAL MEDICINE

## 2024-02-25 PROCEDURE — 6360000002 HC RX W HCPCS: Performed by: STUDENT IN AN ORGANIZED HEALTH CARE EDUCATION/TRAINING PROGRAM

## 2024-02-25 PROCEDURE — 6370000000 HC RX 637 (ALT 250 FOR IP): Performed by: INTERNAL MEDICINE

## 2024-02-25 PROCEDURE — 6370000000 HC RX 637 (ALT 250 FOR IP): Performed by: FAMILY MEDICINE

## 2024-02-25 PROCEDURE — 2580000003 HC RX 258: Performed by: STUDENT IN AN ORGANIZED HEALTH CARE EDUCATION/TRAINING PROGRAM

## 2024-02-25 PROCEDURE — 96366 THER/PROPH/DIAG IV INF ADDON: CPT

## 2024-02-25 PROCEDURE — G0378 HOSPITAL OBSERVATION PER HR: HCPCS

## 2024-02-25 PROCEDURE — 82962 GLUCOSE BLOOD TEST: CPT

## 2024-02-25 PROCEDURE — 36415 COLL VENOUS BLD VENIPUNCTURE: CPT

## 2024-02-25 PROCEDURE — 2580000003 HC RX 258: Performed by: INTERNAL MEDICINE

## 2024-02-25 PROCEDURE — 99232 SBSQ HOSP IP/OBS MODERATE 35: CPT | Performed by: FAMILY MEDICINE

## 2024-02-25 RX ORDER — BISACODYL 10 MG
10 SUPPOSITORY, RECTAL RECTAL ONCE
Status: COMPLETED | OUTPATIENT
Start: 2024-02-25 | End: 2024-02-25

## 2024-02-25 RX ORDER — FLUCONAZOLE 100 MG/1
150 TABLET ORAL DAILY
Status: DISCONTINUED | OUTPATIENT
Start: 2024-02-25 | End: 2024-02-29

## 2024-02-25 RX ADMIN — MEROPENEM 1000 MG: 1 INJECTION, POWDER, FOR SOLUTION INTRAVENOUS at 15:57

## 2024-02-25 RX ADMIN — MAGNESIUM HYDROXIDE 30 ML: 400 SUSPENSION ORAL at 12:33

## 2024-02-25 RX ADMIN — MEROPENEM 1000 MG: 1 INJECTION, POWDER, FOR SOLUTION INTRAVENOUS at 06:03

## 2024-02-25 RX ADMIN — CARVEDILOL 12.5 MG: 12.5 TABLET, FILM COATED ORAL at 21:22

## 2024-02-25 RX ADMIN — BISACODYL 10 MG: 10 SUPPOSITORY RECTAL at 12:31

## 2024-02-25 RX ADMIN — PREDNISONE 20 MG: 20 TABLET ORAL at 12:32

## 2024-02-25 RX ADMIN — PANTOPRAZOLE SODIUM 40 MG: 40 TABLET, DELAYED RELEASE ORAL at 12:32

## 2024-02-25 RX ADMIN — ENOXAPARIN SODIUM 40 MG: 100 INJECTION SUBCUTANEOUS at 12:31

## 2024-02-25 RX ADMIN — OXYCODONE HYDROCHLORIDE AND ACETAMINOPHEN 1 TABLET: 5; 325 TABLET ORAL at 21:21

## 2024-02-25 RX ADMIN — DICLOFENAC SODIUM 2 G: 10 GEL TOPICAL at 21:25

## 2024-02-25 RX ADMIN — DICLOFENAC SODIUM 2 G: 10 GEL TOPICAL at 12:28

## 2024-02-25 RX ADMIN — LISINOPRIL 10 MG: 5 TABLET ORAL at 12:32

## 2024-02-25 RX ADMIN — POLYETHYLENE GLYCOL 3350 17 G: 17 POWDER, FOR SOLUTION ORAL at 12:31

## 2024-02-25 RX ADMIN — Medication: at 12:28

## 2024-02-25 RX ADMIN — PRIMIDONE 50 MG: 50 TABLET ORAL at 12:32

## 2024-02-25 RX ADMIN — OXYCODONE HYDROCHLORIDE AND ACETAMINOPHEN 1 TABLET: 5; 325 TABLET ORAL at 12:33

## 2024-02-25 RX ADMIN — MEROPENEM 1000 MG: 1 INJECTION, POWDER, FOR SOLUTION INTRAVENOUS at 21:31

## 2024-02-25 RX ADMIN — Medication: at 21:27

## 2024-02-25 RX ADMIN — INSULIN LISPRO 2 UNITS: 100 INJECTION, SOLUTION INTRAVENOUS; SUBCUTANEOUS at 12:29

## 2024-02-25 RX ADMIN — CLONAZEPAM 1 MG: 1 TABLET ORAL at 12:33

## 2024-02-25 RX ADMIN — CARVEDILOL 12.5 MG: 12.5 TABLET, FILM COATED ORAL at 12:32

## 2024-02-25 RX ADMIN — PRIMIDONE 50 MG: 50 TABLET ORAL at 21:22

## 2024-02-25 RX ADMIN — INSULIN GLARGINE 26 UNITS: 100 INJECTION, SOLUTION SUBCUTANEOUS at 12:29

## 2024-02-25 RX ADMIN — ACETAMINOPHEN 650 MG: 325 TABLET ORAL at 02:12

## 2024-02-25 RX ADMIN — FLUCONAZOLE 150 MG: 100 TABLET ORAL at 12:31

## 2024-02-25 RX ADMIN — MONTELUKAST 10 MG: 10 TABLET, FILM COATED ORAL at 21:22

## 2024-02-25 RX ADMIN — SODIUM CHLORIDE, PRESERVATIVE FREE 10 ML: 5 INJECTION INTRAVENOUS at 21:23

## 2024-02-25 ASSESSMENT — PAIN SCALES - GENERAL
PAINLEVEL_OUTOF10: 8
PAINLEVEL_OUTOF10: 0
PAINLEVEL_OUTOF10: 6

## 2024-02-25 ASSESSMENT — PAIN SCALES - WONG BAKER: WONGBAKER_NUMERICALRESPONSE: 0

## 2024-02-25 ASSESSMENT — PAIN DESCRIPTION - LOCATION
LOCATION: LEG
LOCATION: ABDOMEN

## 2024-02-25 ASSESSMENT — PAIN DESCRIPTION - ORIENTATION: ORIENTATION: LEFT;RIGHT

## 2024-02-25 NOTE — PROGRESS NOTES
Admit Date: 2/21/2024  Hospital day 5    Subjective:     Patient has complaints of constipation. Pain is somewhat better on bid percocet and diclofenac gel. No fever, chills appetite fair. ..   Medication side effects: none    Scheduled Meds:   bisacodyl  10 mg Rectal Once    magnesium hydroxide  30 mL Oral Once    diclofenac sodium  2 g Topical 4x daily    insulin glargine  26 Units SubCUTAneous Daily    oxyCODONE-acetaminophen  1 tablet Oral Q12H    polyethylene glycol  17 g Oral Daily    balsum peru-castor oil   Topical BID    carvedilol  12.5 mg Oral BID    clonazePAM  1 mg Oral Daily    pantoprazole  40 mg Oral Daily    [Held by provider] rosuvastatin  20 mg Oral Nightly    lisinopril  10 mg Oral Daily    montelukast  10 mg Oral Nightly    predniSONE  20 mg Oral Daily    primidone  50 mg Oral BID    sodium chloride flush  5-40 mL IntraVENous 2 times per day    enoxaparin  40 mg SubCUTAneous Daily    lidocaine  1 patch TransDERmal Daily    insulin lispro  0-8 Units SubCUTAneous TID WC    insulin lispro  0-4 Units SubCUTAneous Nightly    meropenem  1,000 mg IntraVENous Q8H     Continuous Infusions:   sodium chloride      dextrose       PRN Meds:calcium carbonate, sodium chloride flush, sodium chloride, ondansetron **OR** ondansetron, polyethylene glycol, acetaminophen **OR** acetaminophen, glucose, dextrose bolus **OR** dextrose bolus, glucagon (rDNA), dextrose    Review of Systems  Pertinent items are noted in HPI.    Objective:     Patient Vitals for the past 8 hrs:   BP Temp Temp src Pulse   02/25/24 0029 132/81 98.1 °F (36.7 °C) Oral 64     I/O last 3 completed shifts:  In: 692.9 [IV Piggyback:692.9]  Out: 400 [Urine:400]  No intake/output data recorded.    /81   Pulse 64   Temp 98.1 °F (36.7 °C) (Oral)   Resp 14   Ht 1.6 m (5' 3\")   Wt 81.6 kg (180 lb)   SpO2 99%   BMI 31.89 kg/m²   Lungs: clear to auscultation bilaterally  Heart: regular rate and rhythm, S1, S2 normal, no murmur, click, rub or

## 2024-02-25 NOTE — PROGRESS NOTES
End of Shift Note    Bedside shift change report given to Cher (oncoming nurse) by James Moreno RN (offgoing nurse).  Report included the following information Med Rec Status    Shift worked:  1900- 0730     Shift summary and any significant changes:     The patient was transferred from Orthopaedic floor at 0030. Patient received alert and orientated, with occasional episode of forgetfulness. Vitals WNL. Analgesia given with little effect. Oral refreshment given patient settled into bed. She continues on iv antibiotics.      Concerns for physician to address:  Patient express that she is very constipated. She would like a stronger laxative.     Zone phone for oncoming shift:   [phone]       Activity:     Number times ambulated in hallways past shift: 0  Number of times OOB to chair past shift: 0    Cardiac:   Cardiac Monitoring: No           Access:  Current line(s): PIV     Genitourinary:   Urinary status: incontinent and external catheter    Respiratory:      Chronic home O2 use?: NO  Incentive spirometer at bedside: NO       GI:     Current diet:  ADULT DIET; Regular; 3 carb choices (45 gm/meal); Low Sodium (2 gm)  Passing flatus: NO  Tolerating current diet: YES       Pain Management:   Patient states pain is manageable on current regimen: NO    Skin:     Interventions: specialty bed, float heels, foam dressing, and PT/OT consult    Patient Safety:  Fall Score:    Interventions: assistive device (walker, cane. etc) and gripper socks       Length of Stay:  Expected LOS: 6  Actual LOS: 0      James Moreno RN

## 2024-02-25 NOTE — CARE COORDINATION
SANCHO received a call from utilization review asking for assistance to inform the doctor that patient needs to be upgraded due to their length of stay in the hospital. SANCHO contacted the MD on call John Henriquez and left a voicemail. CM attempted anther number and was able to speak to him. He stated Dr. Vang was rounding for the day but has left but will upgrade the patient from observation to inpatient status once he is onsite.     Kimberly Castorena, RN  Case Management  [phone]

## 2024-02-25 NOTE — PLAN OF CARE
Problem: Pain  Goal: Verbalizes/displays adequate comfort level or baseline comfort level  Outcome: Progressing     Problem: ABCDS Injury Assessment  Goal: Absence of physical injury  Outcome: Progressing     Problem: Safety - Adult  Goal: Free from fall injury  Outcome: Progressing     Problem: Skin/Tissue Integrity  Goal: Absence of new skin breakdown  Description: 1.  Monitor for areas of redness and/or skin breakdown  2.  Assess vascular access sites hourly  3.  Every 4-6 hours minimum:  Change oxygen saturation probe site  4.  Every 4-6 hours:  If on nasal continuous positive airway pressure, respiratory therapy assess nares and determine need for appliance change or resting period.  Outcome: Progressing     Problem: Neurosensory - Adult  Goal: Achieves stable or improved neurological status  Outcome: Progressing  Goal: Absence of seizures  Outcome: Progressing  Goal: Remains free of injury related to seizures activity  Outcome: Progressing  Goal: Achieves maximal functionality and self care  Outcome: Progressing     Problem: Respiratory - Adult  Goal: Achieves optimal ventilation and oxygenation  Outcome: Progressing     Problem: Cardiovascular - Adult  Goal: Maintains optimal cardiac output and hemodynamic stability  Outcome: Progressing  Goal: Absence of cardiac dysrhythmias or at baseline  Outcome: Progressing     Problem: Skin/Tissue Integrity - Adult  Goal: Skin integrity remains intact  Outcome: Progressing  Goal: Oral mucous membranes remain intact  Outcome: Progressing     Problem: Musculoskeletal - Adult  Goal: Return mobility to safest level of function  Outcome: Progressing  Goal: Maintain proper alignment of affected body part  Outcome: Progressing  Goal: Return ADL status to a safe level of function  Outcome: Progressing     Problem: Gastrointestinal - Adult  Goal: Minimal or absence of nausea and vomiting  Outcome: Progressing  Goal: Maintains or returns to baseline bowel function  Outcome:  Progressing  Goal: Maintains adequate nutritional intake  Outcome: Progressing  Goal: Establish and maintain optimal ostomy function  Outcome: Progressing     Problem: Genitourinary - Adult  Goal: Absence of urinary retention  Outcome: Progressing  Goal: Urinary catheter remains patent  Outcome: Progressing     Problem: Infection - Adult  Goal: Absence of infection at discharge  Outcome: Progressing  Goal: Absence of infection during hospitalization  Outcome: Progressing  Goal: Absence of fever/infection during anticipated neutropenic period  Outcome: Progressing     Problem: Metabolic/Fluid and Electrolytes - Adult  Goal: Electrolytes maintained within normal limits  Outcome: Progressing  Goal: Hemodynamic stability and optimal renal function maintained  Outcome: Progressing     Problem: Hematologic - Adult  Goal: Maintains hematologic stability  Outcome: Progressing     Problem: Chronic Conditions and Co-morbidities  Goal: Patient's chronic conditions and co-morbidity symptoms are monitored and maintained or improved  Outcome: Progressing     Problem: Discharge Planning  Goal: Discharge to home or other facility with appropriate resources  Outcome: Progressing     Problem: Pain  Goal: Verbalizes/displays adequate comfort level or baseline comfort level  Outcome: Progressing     Problem: ABCDS Injury Assessment  Goal: Absence of physical injury  Outcome: Progressing     Problem: Safety - Adult  Goal: Free from fall injury  Outcome: Progressing     Problem: Skin/Tissue Integrity  Goal: Absence of new skin breakdown  Description: 1.  Monitor for areas of redness and/or skin breakdown  2.  Assess vascular access sites hourly  3.  Every 4-6 hours minimum:  Change oxygen saturation probe site  4.  Every 4-6 hours:  If on nasal continuous positive airway pressure, respiratory therapy assess nares and determine need for appliance change or resting period.  Outcome: Progressing

## 2024-02-25 NOTE — PROGRESS NOTES
1320 [facility] Vital signs stable. Report given to YUDELKA Ramirez.

## 2024-02-25 NOTE — PROGRESS NOTES
Requested card to measure Voltaren gel, pt states she does not use only 2gm at home , she applies to her legs, top to bottom, bottom of feet , in between toes and sometimes hands, explained the dangers of using too much of this medication, pt states her PCP told her that she did not need to worry about using too much of this medication and only the oral form would cause problems with her labs.

## 2024-02-26 PROBLEM — G62.9 NEUROPATHY: Status: ACTIVE | Noted: 2022-07-26

## 2024-02-26 LAB
ANION GAP SERPL CALC-SCNC: 4 MMOL/L (ref 5–15)
BUN SERPL-MCNC: 23 MG/DL (ref 6–20)
BUN/CREAT SERPL: 40 (ref 12–20)
CALCIUM SERPL-MCNC: 8.6 MG/DL (ref 8.5–10.1)
CHLORIDE SERPL-SCNC: 106 MMOL/L (ref 97–108)
CO2 SERPL-SCNC: 26 MMOL/L (ref 21–32)
CREAT SERPL-MCNC: 0.57 MG/DL (ref 0.55–1.02)
GLUCOSE BLD STRIP.AUTO-MCNC: 165 MG/DL (ref 65–117)
GLUCOSE BLD STRIP.AUTO-MCNC: 186 MG/DL (ref 65–117)
GLUCOSE BLD STRIP.AUTO-MCNC: 206 MG/DL (ref 65–117)
GLUCOSE BLD STRIP.AUTO-MCNC: 260 MG/DL (ref 65–117)
GLUCOSE BLD STRIP.AUTO-MCNC: 288 MG/DL (ref 65–117)
GLUCOSE SERPL-MCNC: 194 MG/DL (ref 65–100)
POTASSIUM SERPL-SCNC: 4.4 MMOL/L (ref 3.5–5.1)
SERVICE CMNT-IMP: ABNORMAL
SODIUM SERPL-SCNC: 136 MMOL/L (ref 136–145)

## 2024-02-26 PROCEDURE — 96366 THER/PROPH/DIAG IV INF ADDON: CPT

## 2024-02-26 PROCEDURE — 2580000003 HC RX 258: Performed by: STUDENT IN AN ORGANIZED HEALTH CARE EDUCATION/TRAINING PROGRAM

## 2024-02-26 PROCEDURE — 6360000002 HC RX W HCPCS: Performed by: STUDENT IN AN ORGANIZED HEALTH CARE EDUCATION/TRAINING PROGRAM

## 2024-02-26 PROCEDURE — 82962 GLUCOSE BLOOD TEST: CPT

## 2024-02-26 PROCEDURE — 6370000000 HC RX 637 (ALT 250 FOR IP): Performed by: INTERNAL MEDICINE

## 2024-02-26 PROCEDURE — G0378 HOSPITAL OBSERVATION PER HR: HCPCS

## 2024-02-26 PROCEDURE — 6360000002 HC RX W HCPCS: Performed by: INTERNAL MEDICINE

## 2024-02-26 PROCEDURE — 36415 COLL VENOUS BLD VENIPUNCTURE: CPT

## 2024-02-26 PROCEDURE — 6370000000 HC RX 637 (ALT 250 FOR IP): Performed by: FAMILY MEDICINE

## 2024-02-26 PROCEDURE — 99233 SBSQ HOSP IP/OBS HIGH 50: CPT | Performed by: INTERNAL MEDICINE

## 2024-02-26 PROCEDURE — 80048 BASIC METABOLIC PNL TOTAL CA: CPT

## 2024-02-26 PROCEDURE — 2580000003 HC RX 258: Performed by: INTERNAL MEDICINE

## 2024-02-26 PROCEDURE — 96372 THER/PROPH/DIAG INJ SC/IM: CPT

## 2024-02-26 RX ADMIN — DICLOFENAC SODIUM 2 G: 10 GEL TOPICAL at 09:28

## 2024-02-26 RX ADMIN — SODIUM CHLORIDE, PRESERVATIVE FREE 10 ML: 5 INJECTION INTRAVENOUS at 20:02

## 2024-02-26 RX ADMIN — OXYCODONE HYDROCHLORIDE AND ACETAMINOPHEN 1 TABLET: 5; 325 TABLET ORAL at 09:27

## 2024-02-26 RX ADMIN — Medication: at 20:53

## 2024-02-26 RX ADMIN — SODIUM CHLORIDE, PRESERVATIVE FREE 10 ML: 5 INJECTION INTRAVENOUS at 09:28

## 2024-02-26 RX ADMIN — MONTELUKAST 10 MG: 10 TABLET, FILM COATED ORAL at 20:01

## 2024-02-26 RX ADMIN — PANTOPRAZOLE SODIUM 40 MG: 40 TABLET, DELAYED RELEASE ORAL at 09:27

## 2024-02-26 RX ADMIN — PRIMIDONE 50 MG: 50 TABLET ORAL at 09:27

## 2024-02-26 RX ADMIN — LISINOPRIL 10 MG: 5 TABLET ORAL at 09:32

## 2024-02-26 RX ADMIN — FLUCONAZOLE 150 MG: 100 TABLET ORAL at 09:27

## 2024-02-26 RX ADMIN — MEROPENEM 1000 MG: 1 INJECTION, POWDER, FOR SOLUTION INTRAVENOUS at 21:00

## 2024-02-26 RX ADMIN — PREDNISONE 20 MG: 20 TABLET ORAL at 09:28

## 2024-02-26 RX ADMIN — OXYCODONE HYDROCHLORIDE AND ACETAMINOPHEN 1 TABLET: 5; 325 TABLET ORAL at 20:01

## 2024-02-26 RX ADMIN — DICLOFENAC SODIUM 2 G: 10 GEL TOPICAL at 12:43

## 2024-02-26 RX ADMIN — DICLOFENAC SODIUM 2 G: 10 GEL TOPICAL at 16:48

## 2024-02-26 RX ADMIN — Medication: at 09:28

## 2024-02-26 RX ADMIN — DICLOFENAC SODIUM 2 G: 10 GEL TOPICAL at 20:52

## 2024-02-26 RX ADMIN — INSULIN GLARGINE 26 UNITS: 100 INJECTION, SOLUTION SUBCUTANEOUS at 09:26

## 2024-02-26 RX ADMIN — ENOXAPARIN SODIUM 40 MG: 100 INJECTION SUBCUTANEOUS at 09:27

## 2024-02-26 RX ADMIN — CARVEDILOL 12.5 MG: 12.5 TABLET, FILM COATED ORAL at 09:32

## 2024-02-26 RX ADMIN — INSULIN LISPRO 4 UNITS: 100 INJECTION, SOLUTION INTRAVENOUS; SUBCUTANEOUS at 16:48

## 2024-02-26 RX ADMIN — MEROPENEM 1000 MG: 1 INJECTION, POWDER, FOR SOLUTION INTRAVENOUS at 12:44

## 2024-02-26 RX ADMIN — POLYETHYLENE GLYCOL 3350 17 G: 17 POWDER, FOR SOLUTION ORAL at 09:27

## 2024-02-26 RX ADMIN — PRIMIDONE 50 MG: 50 TABLET ORAL at 20:01

## 2024-02-26 RX ADMIN — MEROPENEM 1000 MG: 1 INJECTION, POWDER, FOR SOLUTION INTRAVENOUS at 04:36

## 2024-02-26 RX ADMIN — CLONAZEPAM 1 MG: 1 TABLET ORAL at 09:27

## 2024-02-26 ASSESSMENT — PAIN SCALES - GENERAL
PAINLEVEL_OUTOF10: 3
PAINLEVEL_OUTOF10: 3

## 2024-02-26 NOTE — PROGRESS NOTES
INTERNAL MEDICINE PROGRESS NOTE    NAME:  Siomara Collins   :   1945   MRN:   [id number]     Date/Time:  2024 6:18 AM  Subjective:   History:  Chart reviewed and patient seen and examined and D/W her nurse this AM and all events noted. She is followed by me for HTN, HLD, DM, ILD, Chronic hypoxemic respiratory failure, DJD, Obesity and other medical problems. She has now been admitted with Klebsiella ESBL UTI and on Meropenem since .  This a.m. she remains very weak but denies any as focal neurologic complaints.  She denies any chest pain, shortness of breath or other cardiac respiratory complaints.  She notes no current GI or  complaints.  She notes no other complaints on complete review of systems.      Medications reviewed:  Current Facility-Administered Medications   Medication Dose Route Frequency    fluconazole (DIFLUCAN) tablet 150 mg  150 mg Oral Daily    diclofenac sodium (VOLTAREN) 1 % gel 2 g  2 g Topical 4x daily    insulin glargine (LANTUS) injection vial 26 Units  26 Units SubCUTAneous Daily    oxyCODONE-acetaminophen (PERCOCET) 5-325 MG per tablet 1 tablet  1 tablet Oral Q12H    polyethylene glycol (GLYCOLAX) packet 17 g  17 g Oral Daily    calcium carbonate (TUMS) chewable tablet 1,000 mg  1,000 mg Oral 4x Daily PRN    balsum peru-castor oil (VENELEX) ointment   Topical BID    carvedilol (COREG) tablet 12.5 mg  12.5 mg Oral BID    clonazePAM (KLONOPIN) tablet 1 mg  1 mg Oral Daily    pantoprazole (PROTONIX) tablet 40 mg  40 mg Oral Daily    [Held by provider] rosuvastatin (CRESTOR) tablet 20 mg  20 mg Oral Nightly    lisinopril (PRINIVIL;ZESTRIL) tablet 10 mg  10 mg Oral Daily    montelukast (SINGULAIR) tablet 10 mg  10 mg Oral Nightly    predniSONE (DELTASONE) tablet 20 mg  20 mg Oral Daily    primidone (MYSOLINE) tablet 50 mg  50 mg Oral BID    sodium chloride flush 0.9 % injection 5-40 mL  5-40 mL IntraVENous 2 times per day    sodium chloride flush 0.9 % injection 5-40 mL  5-40 mL  respiratory failure (HCC)    Polymyalgia rheumatica (HCC)    Neuropathy  Resolved Problems:    * No resolved hospital problems. *          ___________________________________________________  PLAN:    1.  Continue Meropenem for full 7 day course  2.  DM continue Lantus and follow BS and treat with SSI  3.  Received PO Diflucan for Candida in urine as well with 5-day course ordered  4.  Continue Prednisone for PMR  5.  Chronic pain on Voltaren Gel with PRN Percocet  6.  Neuropathy and LE weakness so PT  7   Hypertension continue Coreg and lisinopril  8.  GERD continue Protonix    Continue other medications reviewed with her.    55 Minutes spent today in direct care of this high complexity patient with greater than 50% in counseling and coordination of care.    If need to contact me use hospital  [phone], DO NOT USE PERFECT SERVE    ___________________________________________________    Attending Physician: Irvin Vang MD

## 2024-02-26 NOTE — WOUND CARE
Wound care nurse consult for blanchable erythema to sacrum  80 y/o female admitted with UTI.  Past Medical History:   Diagnosis Date    Abnormal LFTs 08/24/2017    FATTY LIVER    Arthritis     OSTEO    Avascular necrosis of hip (HCC) 08/24/2017    BILAT HIPS    Breast CA (HCC) 1989, 2001    BILATERAL; SURGERY, CHEMO    Chronic pain     CKD (chronic kidney disease), stage II 8/24/2017    Coagulation disorder ([facility]) 1984    ITP  (DR DC CAR) - PLATELETS DROPPED TO 5K    Depression     Diabetes ([facility]) 2012    TYPE 2; NIDDM    DJD (degenerative joint disease), multiple sites 8/24/2017    GI bleed 2008    HEMORRHOIDS    Hyperlipemia     Hypertension with renal disease 8/24/2017    IBS (irritable bowel syndrome) 8/24/2017    Ill-defined condition 2015    PNEUMONIA X2 - HOSPITALIZED IN 2015    Interstitial lung disease (HCC) 04/01/2019    Liver disease     FATTY LIVER    Myalgia 8/24/2017    On statin therapy 8/24/2017    Overactive bladder 8/24/2017    Pneumonia 04/2015    HOSPITALIZED 3 WEEKS.    Polymyalgia rheumatica (HCC) 8/24/2017    Prophylactic antibiotic 8/24/2017    Reflex abnormality     acid reflex    Rosacea      Past Surgical History:   Procedure Laterality Date    APPENDECTOMY  1950    BREAST BIOPSY Bilateral     BREAST SURGERY  1993, 2002    RECONSTRUCTION X2    CATARACT REMOVAL Bilateral     COLONOSCOPY      GI      COLONOSCOPY    HEENT      WISDOM TEETH    HYSTERECTOMY (CERVIX STATUS UNKNOWN)  2000S    JOINT REPLACEMENT      MASTECTOMY Left 2001    MASTECTOMY Right 1989    ORTHOPEDIC SURGERY  03/26/2019    neckectomy    ORTHOPEDIC SURGERY  2018    RE-DO LUMBAR FUSION, AND ADDED THORACIC FUSION    ORTHOPEDIC SURGERY  2008    LUMBAR FUSION    TOTAL HIP ARTHROPLASTY Left 11/16/2016    ANTERIOR APPROACH, DR JUAREZ; (POSTOP: STANDS ONE INCH TALLER ON LEFT FOOT)    TOTAL HIP ARTHROPLASTY Right 12/2016    ANTERIOR APPROACH (DR JUAREZ)    TUBAL LIGATION  1981    UPPER GASTROINTESTINAL ENDOSCOPY      UROLOGICAL  SURGERY  2000s    BLADDER SLING     Patient has barely blanchable erythema to sacrum. She is incontinent of urine:      Staff have ordered Venelex/BPO ointment and are applying to sacrum and heels. Patient needs pressure relief to sacrum    Recommend:    Sacrum erythema: turn patient side to side avoiding supine to relieve pressure.    NAYELI COTTRELL RN, CWON

## 2024-02-26 NOTE — CARE COORDINATION
Transition of Care Plan:    RUR: Observation   Prior Level of Functioning: Assistance   Disposition: [facility]   If SNF or IPR: Date FOC offered:   Date FOC received:   Accepting facility:   Date authorization started with reference number:   Date authorization received and expires:   Follow up appointments: PCP  DME needed: No DME needed   Transportation at discharge: BLS   IM/IMM Medicare/ letter given: N/A   Is patient a [place] and connected with [facility]? No   If yes, was [place] transfer form completed and VA notified? No  Caregiver Contact: Pt's son   Discharge Caregiver contacted prior to discharge? Pt's son and [facility]  Care Conference needed? No  Barriers to discharge: Medical clearance        CM left a message for the [facility] regarding pt's return to facility. CM has not received a call from [facility].    CM will continue to follow and assist with d/c planning.    Yary Scott

## 2024-02-26 NOTE — PROGRESS NOTES
End of Shift Note    Bedside shift change report given to YUDELKA Bocanegra (oncoming nurse) by JENNIFER VALE RN (offgoing nurse).  Report included the following information SBAR, Kardex, and MAR    Shift worked:  7am-7pm     Shift summary and any significant changes:     Pt tolerated care fairly well. Medications were given and education was provided. Hourly rounding completed. Pt made no complaints of pain during this shift. Incontinence care completed; pt had a large BM during this shift. Pt turned Q2 hours and heels were elevated off the bed.          JENNIFER VALE RN

## 2024-02-27 PROBLEM — N39.0 COMPLICATED URINARY TRACT INFECTION: Status: ACTIVE | Noted: 2024-02-27

## 2024-02-27 LAB
ANION GAP SERPL CALC-SCNC: 4 MMOL/L (ref 5–15)
BASOPHILS # BLD: 0 K/UL (ref 0–0.1)
BASOPHILS NFR BLD: 0 % (ref 0–1)
BUN SERPL-MCNC: 22 MG/DL (ref 6–20)
BUN/CREAT SERPL: 26 (ref 12–20)
CALCIUM SERPL-MCNC: 8.6 MG/DL (ref 8.5–10.1)
CHLORIDE SERPL-SCNC: 106 MMOL/L (ref 97–108)
CO2 SERPL-SCNC: 26 MMOL/L (ref 21–32)
CREAT SERPL-MCNC: 0.85 MG/DL (ref 0.55–1.02)
DIFFERENTIAL METHOD BLD: ABNORMAL
EOSINOPHIL # BLD: 0 K/UL (ref 0–0.4)
EOSINOPHIL NFR BLD: 0 % (ref 0–7)
ERYTHROCYTE [DISTWIDTH] IN BLOOD BY AUTOMATED COUNT: 12.7 % (ref 11.5–14.5)
GLUCOSE BLD STRIP.AUTO-MCNC: 231 MG/DL (ref 65–117)
GLUCOSE BLD STRIP.AUTO-MCNC: 266 MG/DL (ref 65–117)
GLUCOSE BLD STRIP.AUTO-MCNC: 298 MG/DL (ref 65–117)
GLUCOSE BLD STRIP.AUTO-MCNC: 330 MG/DL (ref 65–117)
GLUCOSE SERPL-MCNC: 253 MG/DL (ref 65–100)
HCT VFR BLD AUTO: 41.5 % (ref 35–47)
HGB BLD-MCNC: 14.1 G/DL (ref 11.5–16)
IMM GRANULOCYTES # BLD AUTO: 0.1 K/UL (ref 0–0.04)
IMM GRANULOCYTES NFR BLD AUTO: 1 % (ref 0–0.5)
LYMPHOCYTES # BLD: 1.1 K/UL (ref 0.8–3.5)
LYMPHOCYTES NFR BLD: 15 % (ref 12–49)
MCH RBC QN AUTO: 35.6 PG (ref 26–34)
MCHC RBC AUTO-ENTMCNC: 34 G/DL (ref 30–36.5)
MCV RBC AUTO: 104.8 FL (ref 80–99)
MONOCYTES # BLD: 0.3 K/UL (ref 0–1)
MONOCYTES NFR BLD: 5 % (ref 5–13)
NEUTS SEG # BLD: 5.5 K/UL (ref 1.8–8)
NEUTS SEG NFR BLD: 79 % (ref 32–75)
NRBC # BLD: 0 K/UL (ref 0–0.01)
NRBC BLD-RTO: 0 PER 100 WBC
PLATELET # BLD AUTO: 201 K/UL (ref 150–400)
PMV BLD AUTO: 10.1 FL (ref 8.9–12.9)
POTASSIUM SERPL-SCNC: 4.1 MMOL/L (ref 3.5–5.1)
RBC # BLD AUTO: 3.96 M/UL (ref 3.8–5.2)
SERVICE CMNT-IMP: ABNORMAL
SODIUM SERPL-SCNC: 136 MMOL/L (ref 136–145)
WBC # BLD AUTO: 7 K/UL (ref 3.6–11)

## 2024-02-27 PROCEDURE — 2580000003 HC RX 258: Performed by: INTERNAL MEDICINE

## 2024-02-27 PROCEDURE — 2580000003 HC RX 258: Performed by: STUDENT IN AN ORGANIZED HEALTH CARE EDUCATION/TRAINING PROGRAM

## 2024-02-27 PROCEDURE — 6370000000 HC RX 637 (ALT 250 FOR IP): Performed by: INTERNAL MEDICINE

## 2024-02-27 PROCEDURE — 6370000000 HC RX 637 (ALT 250 FOR IP): Performed by: FAMILY MEDICINE

## 2024-02-27 PROCEDURE — 6360000002 HC RX W HCPCS: Performed by: STUDENT IN AN ORGANIZED HEALTH CARE EDUCATION/TRAINING PROGRAM

## 2024-02-27 PROCEDURE — G0378 HOSPITAL OBSERVATION PER HR: HCPCS

## 2024-02-27 PROCEDURE — 36415 COLL VENOUS BLD VENIPUNCTURE: CPT

## 2024-02-27 PROCEDURE — 85025 COMPLETE CBC W/AUTO DIFF WBC: CPT

## 2024-02-27 PROCEDURE — 97530 THERAPEUTIC ACTIVITIES: CPT | Performed by: PHYSICAL THERAPIST

## 2024-02-27 PROCEDURE — 99233 SBSQ HOSP IP/OBS HIGH 50: CPT | Performed by: INTERNAL MEDICINE

## 2024-02-27 PROCEDURE — 6360000002 HC RX W HCPCS: Performed by: INTERNAL MEDICINE

## 2024-02-27 PROCEDURE — 96366 THER/PROPH/DIAG IV INF ADDON: CPT

## 2024-02-27 PROCEDURE — 80048 BASIC METABOLIC PNL TOTAL CA: CPT

## 2024-02-27 PROCEDURE — 1100000000 HC RM PRIVATE

## 2024-02-27 PROCEDURE — 82962 GLUCOSE BLOOD TEST: CPT

## 2024-02-27 RX ORDER — POLYETHYLENE GLYCOL 3350 17 G/17G
17 POWDER, FOR SOLUTION ORAL 2 TIMES DAILY
Status: DISCONTINUED | OUTPATIENT
Start: 2024-02-27 | End: 2024-03-04 | Stop reason: HOSPADM

## 2024-02-27 RX ADMIN — PRIMIDONE 50 MG: 50 TABLET ORAL at 08:59

## 2024-02-27 RX ADMIN — MONTELUKAST 10 MG: 10 TABLET, FILM COATED ORAL at 20:51

## 2024-02-27 RX ADMIN — SODIUM CHLORIDE, PRESERVATIVE FREE 10 ML: 5 INJECTION INTRAVENOUS at 09:04

## 2024-02-27 RX ADMIN — MEROPENEM 1000 MG: 1 INJECTION, POWDER, FOR SOLUTION INTRAVENOUS at 13:56

## 2024-02-27 RX ADMIN — PREDNISONE 20 MG: 20 TABLET ORAL at 08:56

## 2024-02-27 RX ADMIN — DICLOFENAC SODIUM 2 G: 10 GEL TOPICAL at 14:14

## 2024-02-27 RX ADMIN — INSULIN GLARGINE 26 UNITS: 100 INJECTION, SOLUTION SUBCUTANEOUS at 08:55

## 2024-02-27 RX ADMIN — MEROPENEM 1000 MG: 1 INJECTION, POWDER, FOR SOLUTION INTRAVENOUS at 04:09

## 2024-02-27 RX ADMIN — OXYCODONE HYDROCHLORIDE AND ACETAMINOPHEN 1 TABLET: 5; 325 TABLET ORAL at 08:58

## 2024-02-27 RX ADMIN — MEROPENEM 1000 MG: 1 INJECTION, POWDER, FOR SOLUTION INTRAVENOUS at 21:16

## 2024-02-27 RX ADMIN — DICLOFENAC SODIUM 2 G: 10 GEL TOPICAL at 09:01

## 2024-02-27 RX ADMIN — DICLOFENAC SODIUM 2 G: 10 GEL TOPICAL at 17:07

## 2024-02-27 RX ADMIN — CARVEDILOL 12.5 MG: 12.5 TABLET, FILM COATED ORAL at 20:51

## 2024-02-27 RX ADMIN — LISINOPRIL 10 MG: 5 TABLET ORAL at 08:58

## 2024-02-27 RX ADMIN — CLONAZEPAM 1 MG: 1 TABLET ORAL at 08:56

## 2024-02-27 RX ADMIN — INSULIN LISPRO 4 UNITS: 100 INJECTION, SOLUTION INTRAVENOUS; SUBCUTANEOUS at 17:06

## 2024-02-27 RX ADMIN — Medication: at 20:58

## 2024-02-27 RX ADMIN — PRIMIDONE 50 MG: 50 TABLET ORAL at 20:52

## 2024-02-27 RX ADMIN — DICLOFENAC SODIUM 2 G: 10 GEL TOPICAL at 20:57

## 2024-02-27 RX ADMIN — CARVEDILOL 12.5 MG: 12.5 TABLET, FILM COATED ORAL at 08:58

## 2024-02-27 RX ADMIN — POLYETHYLENE GLYCOL 3350 17 G: 17 POWDER, FOR SOLUTION ORAL at 20:54

## 2024-02-27 RX ADMIN — INSULIN LISPRO 4 UNITS: 100 INJECTION, SOLUTION INTRAVENOUS; SUBCUTANEOUS at 21:26

## 2024-02-27 RX ADMIN — ACETAMINOPHEN 650 MG: 325 TABLET ORAL at 23:01

## 2024-02-27 RX ADMIN — ENOXAPARIN SODIUM 40 MG: 100 INJECTION SUBCUTANEOUS at 09:01

## 2024-02-27 RX ADMIN — SODIUM CHLORIDE, PRESERVATIVE FREE 10 ML: 5 INJECTION INTRAVENOUS at 20:56

## 2024-02-27 RX ADMIN — FLUCONAZOLE 150 MG: 100 TABLET ORAL at 08:56

## 2024-02-27 RX ADMIN — POLYETHYLENE GLYCOL 3350 17 G: 17 POWDER, FOR SOLUTION ORAL at 09:00

## 2024-02-27 RX ADMIN — INSULIN LISPRO 2 UNITS: 100 INJECTION, SOLUTION INTRAVENOUS; SUBCUTANEOUS at 11:59

## 2024-02-27 RX ADMIN — Medication: at 09:00

## 2024-02-27 RX ADMIN — OXYCODONE HYDROCHLORIDE AND ACETAMINOPHEN 1 TABLET: 5; 325 TABLET ORAL at 20:52

## 2024-02-27 RX ADMIN — INSULIN LISPRO 4 UNITS: 100 INJECTION, SOLUTION INTRAVENOUS; SUBCUTANEOUS at 08:55

## 2024-02-27 RX ADMIN — PANTOPRAZOLE SODIUM 40 MG: 40 TABLET, DELAYED RELEASE ORAL at 08:58

## 2024-02-27 ASSESSMENT — PAIN DESCRIPTION - DESCRIPTORS
DESCRIPTORS: ACHING
DESCRIPTORS: DISCOMFORT

## 2024-02-27 ASSESSMENT — PAIN SCALES - GENERAL
PAINLEVEL_OUTOF10: 9
PAINLEVEL_OUTOF10: 6
PAINLEVEL_OUTOF10: 9
PAINLEVEL_OUTOF10: 4

## 2024-02-27 ASSESSMENT — PAIN DESCRIPTION - LOCATION
LOCATION: GENERALIZED
LOCATION: GENERALIZED;LEG
LOCATION: ABDOMEN

## 2024-02-27 ASSESSMENT — PAIN DESCRIPTION - ORIENTATION
ORIENTATION: RIGHT;LEFT
ORIENTATION: LOWER

## 2024-02-27 NOTE — PLAN OF CARE
Problem: Physical Therapy - Adult  Goal: By Discharge: Performs mobility at highest level of function for planned discharge setting.  See evaluation for individualized goals.  Description: FUNCTIONAL STATUS PRIOR TO ADMISSION: Pt reports she was bedbound, required 2-person assist for transfer to chair, reports that she has not ambulated since May. Had done PT/OT at [facility], but has not done so recently as therapist stated she had plateaued.     HOME SUPPORT PRIOR TO ADMISSION: Pt admitted from assisted living facility, had assist for all aspects of mobility     Physical Therapy Goals  Initiated 2/23/2024  1.  Patient will move from supine to sit and sit to supine, scoot up and down, and roll side to side in bed with minimal assistance within 7 day(s).    2.  Patient will maintain unsupported sitting at EOB x10 minutes with supervision within 7 day(s).   3.  Patient will be independent with home exercise program within 7 day(s).   4.  Patient will transfer from bed to chair and chair to bed with maximal assistance using the least restrictive device within 7 day(s).  Outcome: Progressing   PHYSICAL THERAPY TREATMENT    Patient: Siomara Collins (79 y.o. female)  Date: 2/27/2024  Diagnosis: UTI due to extended-spectrum beta lactamase (ESBL) producing Escherichia coli [N39.0, B96.29, Z16.12]  Complicated urinary tract infection [N39.0] UTI due to extended-spectrum beta lactamase (ESBL) producing Escherichia coli      Precautions: Fall Risk, Bed Alarm                      ASSESSMENT:  Patient continues to benefit from skilled PT services and is slowly progressing towards goals. Patient supine upon arrival and anxious for PT.  Patient performed LE exercises with min/mod assist then able to come to sitting EOB with mod assist.  Patient with fair sitting balance and requires occasional assistance to maintain balance.  Practiced sitting balance activities.  Sit to stand with max assist x 1 but able to come to full stand for 10  seconds.  Assisted back to supine and then placed on bedpan.  Patient very motivated to increase strength and mobility.  Patient would like to transfer to chair and bedside commode as soon as possible.  Recommend [facility] for first transfer and assist of 2.  Patient would greatly benefit from continued rehab at discharge as (per patient) she was not receiving rehab at current facility and mobility has greatly declined.  Also recommend OT consult.         PLAN:  Patient continues to benefit from skilled intervention to address the above impairments.  Continue treatment per established plan of care.    Recommendation for discharge: (in order for the patient to meet his/her long term goals): Therapy up to 5 days/week in Skilled nursing facility    Other factors to consider for discharge: no additional factors    IF patient discharges home will need the following DME: continuing to assess with progress       SUBJECTIVE:   Patient stated, \"I really want to get out of bed.\"    OBJECTIVE DATA SUMMARY:   Critical Behavior:      Oriented x 3    Functional Mobility Training:  Bed Mobility:  Bed Mobility Training  Interventions: Verbal cues  Rolling: Minimum assistance  Supine to Sit: Moderate assistance;Assist X1;Additional time  Sit to Supine: Moderate assistance;Assist X1  Scooting: Maximum assistance;Assist X2  Transfers:  Transfer Training  Transfer Training: Yes  Sit to Stand: Maximum assistance;Assist X1  Stand to Sit: Moderate assistance;Assist X1  Bed to Chair:  (not attempted)  Balance:  Balance  Sitting: Impaired  Sitting - Static: Fair (occasional)  Sitting - Dynamic: Fair (occasional)  Standing: Impaired  Standing - Static: Constant support;Poor  Standing - Dynamic: Not tested   Ambulation/Gait Training:        Neuro Re-Education:                    Pain Ratin/10   Pain Intervention(s):       Activity Tolerance:   Fair  and requires rest breaks    After treatment:   Patient left in no apparent distress in

## 2024-02-27 NOTE — PLAN OF CARE
absence of nausea and vomiting  Outcome: Progressing  Goal: Maintains or returns to baseline bowel function  Outcome: Progressing  Goal: Maintains adequate nutritional intake  Outcome: Progressing  Goal: Establish and maintain optimal ostomy function  Outcome: Progressing     Problem: Genitourinary - Adult  Goal: Absence of urinary retention  Outcome: Progressing  Goal: Urinary catheter remains patent  Outcome: Progressing     Problem: Infection - Adult  Goal: Absence of infection at discharge  Outcome: Progressing  Goal: Absence of infection during hospitalization  Outcome: Progressing  Goal: Absence of fever/infection during anticipated neutropenic period  Outcome: Progressing     Problem: Metabolic/Fluid and Electrolytes - Adult  Goal: Electrolytes maintained within normal limits  Outcome: Progressing  Goal: Hemodynamic stability and optimal renal function maintained  Outcome: Progressing     Problem: Hematologic - Adult  Goal: Maintains hematologic stability  Outcome: Progressing     Problem: Chronic Conditions and Co-morbidities  Goal: Patient's chronic conditions and co-morbidity symptoms are monitored and maintained or improved  Outcome: Progressing     Problem: Discharge Planning  Goal: Discharge to home or other facility with appropriate resources  Outcome: Progressing

## 2024-02-27 NOTE — CARE COORDINATION
Transition of Care Plan:     RUR: 17%  Prior Level of Functioning: Assistance   Disposition: [facility] SNF Vs. Inpatient rehab   If SNF or IPR: Date FOC offered: 2/27/24  Date FOC received: 2/27/24  Accepting facility: Pending   Date authorization started with reference number:   Date authorization received and expires:   Follow up appointments: PCP  DME needed: No DME needed   Transportation at discharge: BLS   IM/IMM Medicare/ letter given: N/A   Is patient a [place] and connected with VA? No              If yes, was [place] transfer form completed and VA notified? No  Caregiver Contact: Pt's son   Discharge Caregiver contacted prior to discharge? Pt's son and [facility]  Care Conference needed? No  Barriers to discharge: Medical clearance      CM spoke to pt regarding the recommendation is for SNF placement. Pt understand that the recommendation is for SNF but pt wanted referral to be sent to [facility]. CM explain that CM will send the referral to [facility] but pt really needs to have a plan B just in case [facility] decline.    Pt is agreeable for referral to be sent to [facility] if [facility] does not work out.    CM sent referral to [facility] and [facility].    CM will continue to follow and assist with d/c planning.    Yary Scott  Case manger

## 2024-02-27 NOTE — PROGRESS NOTES
Leadership round with patient.  Patient verbalized that she is happy with her nursing care and they are meeting her needs.  At patient's request reviewed diet order with her and meal that was provided.  Lunch tray was not appealing to her.  Dietary contacted to request a turkey sandwich, fresh fruit and chocolate ice cream.  Patient advised of new tray on the way.      Enjoyed rounding and meeting this patient.

## 2024-02-27 NOTE — PROGRESS NOTES
INTERNAL MEDICINE PROGRESS NOTE    NAME:  Siomara Collins   :   1945   MRN:   [id number]     Date/Time:  2024 5:56 AM  Subjective:   History:  Chart reviewed and patient seen and examined and D/W her nurse this AM and all events noted. She is followed by me for HTN, HLD, DM, ILD, Chronic hypoxemic respiratory failure, DJD, Obesity and other medical problems. She has now been admitted with Klebsiella ESBL UTI and on Meropenem since .  This AM she remains very weak but denies any as focal neurologic complaints.  He PT was ordered on  she was not seen by PT yesterday .  She denies any chest pain, shortness of breath or other cardiac/respiratory complaints.  She notes no current GI or  complaints except constipation.  She notes no other complaints on complete review of systems.  She is concerned about the fact that her living arrangement in her current environment has her in the memory care unit which I do not think is indicated as her memory seems to be normal age-related.      Medications reviewed:  Current Facility-Administered Medications   Medication Dose Route Frequency    fluconazole (DIFLUCAN) tablet 150 mg  150 mg Oral Daily    diclofenac sodium (VOLTAREN) 1 % gel 2 g  2 g Topical 4x daily    insulin glargine (LANTUS) injection vial 26 Units  26 Units SubCUTAneous Daily    oxyCODONE-acetaminophen (PERCOCET) 5-325 MG per tablet 1 tablet  1 tablet Oral Q12H    polyethylene glycol (GLYCOLAX) packet 17 g  17 g Oral Daily    calcium carbonate (TUMS) chewable tablet 1,000 mg  1,000 mg Oral 4x Daily PRN    balsum peru-castor oil (VENELEX) ointment   Topical BID    carvedilol (COREG) tablet 12.5 mg  12.5 mg Oral BID    clonazePAM (KLONOPIN) tablet 1 mg  1 mg Oral Daily    pantoprazole (PROTONIX) tablet 40 mg  40 mg Oral Daily    [Held by provider] rosuvastatin (CRESTOR) tablet 20 mg  20 mg Oral Nightly    lisinopril (PRINIVIL;ZESTRIL) tablet 10 mg  10 mg Oral Daily    montelukast (SINGULAIR)  obvious abnormality, atraumatic.  Eyes:    Conjunctivae/corneas clear.  PERRLA  Nose:   Nares normal. No drainage or sinus tenderness.  Throat:     Lips, mucosa, and tongue normal.  No Thrush  Neck:   Supple, symmetrical,  no adenopathy, thyroid: non tender     no carotid bruit and no JVD.  Back:     Symmetric,  No CVA tenderness.  Lungs:    Clear to auscultation bilaterally.  No Wheezing or Rhonchi. No rales.  Heart:    Regular rate and rhythm,  no murmur, rub or gallop.  Abdomen:    Soft, non-tender. Not distended.  Bowel sounds normal. No masses  Extremities:  Extremities normal, atraumatic, No cyanosis.  No edema. No clubbing  Lymph nodes:  Cervical, supraclavicular normal.  Neurologic: Generalized mild decreased strength, Alert and oriented X 3.   Skin:                No rash      Lab Data Reviewed:    Recent Results (from the past 24 hour(s))   POCT Glucose    Collection Time: 02/26/24  6:37 AM   Result Value Ref Range    POC Glucose 186 (H) 65 - 117 mg/dL    Performed by: Fanta Steele PCT    POCT Glucose    Collection Time: 02/26/24 11:48 AM   Result Value Ref Range    POC Glucose 165 (H) 65 - 117 mg/dL    Performed by: Yanick Thomson (CON)    POCT Glucose    Collection Time: 02/26/24  4:27 PM   Result Value Ref Range    POC Glucose 260 (H) 65 - 117 mg/dL    Performed by: Anatoly Brewer    POCT Glucose    Collection Time: 02/26/24  4:38 PM   Result Value Ref Range    POC Glucose 206 (H) 65 - 117 mg/dL    Performed by: Yanick Thomson (KIMBERLI)    POCT Glucose    Collection Time: 02/26/24  8:19 PM   Result Value Ref Range    POC Glucose 288 (H) 65 - 117 mg/dL    Performed by: Liddle McKenzie PCT    Basic Metabolic Panel    Collection Time: 02/27/24  1:37 AM   Result Value Ref Range    Sodium 136 136 - 145 mmol/L    Potassium 4.1 3.5 - 5.1 mmol/L    Chloride 106 97 - 108 mmol/L    CO2 26 21 - 32 mmol/L    Anion Gap 4 (L) 5 - 15 mmol/L    Glucose 253 (H) 65 - 100 mg/dL    BUN 22 (H) 6 - 20 MG/DL    Creatinine 0.85 0.55 -

## 2024-02-28 LAB
ANION GAP SERPL CALC-SCNC: 7 MMOL/L (ref 5–15)
BUN SERPL-MCNC: 20 MG/DL (ref 6–20)
BUN/CREAT SERPL: 38 (ref 12–20)
CALCIUM SERPL-MCNC: 9.1 MG/DL (ref 8.5–10.1)
CHLORIDE SERPL-SCNC: 104 MMOL/L (ref 97–108)
CO2 SERPL-SCNC: 26 MMOL/L (ref 21–32)
CREAT SERPL-MCNC: 0.52 MG/DL (ref 0.55–1.02)
GLUCOSE BLD STRIP.AUTO-MCNC: 161 MG/DL (ref 65–117)
GLUCOSE BLD STRIP.AUTO-MCNC: 181 MG/DL (ref 65–117)
GLUCOSE BLD STRIP.AUTO-MCNC: 309 MG/DL (ref 65–117)
GLUCOSE BLD STRIP.AUTO-MCNC: 389 MG/DL (ref 65–117)
GLUCOSE SERPL-MCNC: 222 MG/DL (ref 65–100)
POTASSIUM SERPL-SCNC: 4 MMOL/L (ref 3.5–5.1)
SERVICE CMNT-IMP: ABNORMAL
SODIUM SERPL-SCNC: 137 MMOL/L (ref 136–145)

## 2024-02-28 PROCEDURE — 2580000003 HC RX 258: Performed by: INTERNAL MEDICINE

## 2024-02-28 PROCEDURE — 36415 COLL VENOUS BLD VENIPUNCTURE: CPT

## 2024-02-28 PROCEDURE — 6360000002 HC RX W HCPCS: Performed by: STUDENT IN AN ORGANIZED HEALTH CARE EDUCATION/TRAINING PROGRAM

## 2024-02-28 PROCEDURE — 97535 SELF CARE MNGMENT TRAINING: CPT

## 2024-02-28 PROCEDURE — 82962 GLUCOSE BLOOD TEST: CPT

## 2024-02-28 PROCEDURE — 6370000000 HC RX 637 (ALT 250 FOR IP): Performed by: INTERNAL MEDICINE

## 2024-02-28 PROCEDURE — 97167 OT EVAL HIGH COMPLEX 60 MIN: CPT

## 2024-02-28 PROCEDURE — 97116 GAIT TRAINING THERAPY: CPT

## 2024-02-28 PROCEDURE — 97530 THERAPEUTIC ACTIVITIES: CPT

## 2024-02-28 PROCEDURE — 6360000002 HC RX W HCPCS: Performed by: INTERNAL MEDICINE

## 2024-02-28 PROCEDURE — 99233 SBSQ HOSP IP/OBS HIGH 50: CPT | Performed by: INTERNAL MEDICINE

## 2024-02-28 PROCEDURE — 80048 BASIC METABOLIC PNL TOTAL CA: CPT

## 2024-02-28 PROCEDURE — 6370000000 HC RX 637 (ALT 250 FOR IP): Performed by: FAMILY MEDICINE

## 2024-02-28 PROCEDURE — 2580000003 HC RX 258: Performed by: STUDENT IN AN ORGANIZED HEALTH CARE EDUCATION/TRAINING PROGRAM

## 2024-02-28 PROCEDURE — 1100000000 HC RM PRIVATE

## 2024-02-28 RX ORDER — LORAZEPAM 1 MG/1
1 TABLET ORAL 3 TIMES DAILY PRN
Status: DISCONTINUED | OUTPATIENT
Start: 2024-02-28 | End: 2024-03-04 | Stop reason: HOSPADM

## 2024-02-28 RX ORDER — ESCITALOPRAM OXALATE 10 MG/1
10 TABLET ORAL DAILY
Status: DISCONTINUED | OUTPATIENT
Start: 2024-02-28 | End: 2024-03-04 | Stop reason: HOSPADM

## 2024-02-28 RX ADMIN — INSULIN LISPRO 4 UNITS: 100 INJECTION, SOLUTION INTRAVENOUS; SUBCUTANEOUS at 20:42

## 2024-02-28 RX ADMIN — POLYETHYLENE GLYCOL 3350 17 G: 17 POWDER, FOR SOLUTION ORAL at 10:49

## 2024-02-28 RX ADMIN — PREDNISONE 20 MG: 20 TABLET ORAL at 10:40

## 2024-02-28 RX ADMIN — CARVEDILOL 12.5 MG: 12.5 TABLET, FILM COATED ORAL at 20:46

## 2024-02-28 RX ADMIN — FLUCONAZOLE 150 MG: 100 TABLET ORAL at 10:38

## 2024-02-28 RX ADMIN — LISINOPRIL 10 MG: 5 TABLET ORAL at 10:36

## 2024-02-28 RX ADMIN — INSULIN GLARGINE 26 UNITS: 100 INJECTION, SOLUTION SUBCUTANEOUS at 10:35

## 2024-02-28 RX ADMIN — DICLOFENAC SODIUM 2 G: 10 GEL TOPICAL at 10:50

## 2024-02-28 RX ADMIN — PANTOPRAZOLE SODIUM 40 MG: 40 TABLET, DELAYED RELEASE ORAL at 10:36

## 2024-02-28 RX ADMIN — CLONAZEPAM 1 MG: 1 TABLET ORAL at 10:37

## 2024-02-28 RX ADMIN — DICLOFENAC SODIUM 2 G: 10 GEL TOPICAL at 20:50

## 2024-02-28 RX ADMIN — MEROPENEM 1000 MG: 1 INJECTION, POWDER, FOR SOLUTION INTRAVENOUS at 04:06

## 2024-02-28 RX ADMIN — MEROPENEM 1000 MG: 1 INJECTION, POWDER, FOR SOLUTION INTRAVENOUS at 20:45

## 2024-02-28 RX ADMIN — PRIMIDONE 50 MG: 50 TABLET ORAL at 10:38

## 2024-02-28 RX ADMIN — OXYCODONE HYDROCHLORIDE AND ACETAMINOPHEN 1 TABLET: 5; 325 TABLET ORAL at 10:48

## 2024-02-28 RX ADMIN — ESCITALOPRAM OXALATE 10 MG: 10 TABLET ORAL at 13:41

## 2024-02-28 RX ADMIN — OXYCODONE HYDROCHLORIDE AND ACETAMINOPHEN 1 TABLET: 5; 325 TABLET ORAL at 20:46

## 2024-02-28 RX ADMIN — LORAZEPAM 1 MG: 1 TABLET ORAL at 20:46

## 2024-02-28 RX ADMIN — MEROPENEM 1000 MG: 1 INJECTION, POWDER, FOR SOLUTION INTRAVENOUS at 13:48

## 2024-02-28 RX ADMIN — POLYETHYLENE GLYCOL 3350 17 G: 17 POWDER, FOR SOLUTION ORAL at 20:47

## 2024-02-28 RX ADMIN — SODIUM CHLORIDE, PRESERVATIVE FREE 10 ML: 5 INJECTION INTRAVENOUS at 10:41

## 2024-02-28 RX ADMIN — MONTELUKAST 10 MG: 10 TABLET, FILM COATED ORAL at 20:46

## 2024-02-28 RX ADMIN — INSULIN LISPRO 8 UNITS: 100 INJECTION, SOLUTION INTRAVENOUS; SUBCUTANEOUS at 17:14

## 2024-02-28 RX ADMIN — ENOXAPARIN SODIUM 40 MG: 100 INJECTION SUBCUTANEOUS at 10:38

## 2024-02-28 RX ADMIN — SODIUM CHLORIDE, PRESERVATIVE FREE 10 ML: 5 INJECTION INTRAVENOUS at 20:43

## 2024-02-28 RX ADMIN — PRIMIDONE 50 MG: 50 TABLET ORAL at 20:46

## 2024-02-28 RX ADMIN — Medication: at 10:43

## 2024-02-28 RX ADMIN — Medication: at 20:50

## 2024-02-28 RX ADMIN — CARVEDILOL 12.5 MG: 12.5 TABLET, FILM COATED ORAL at 10:38

## 2024-02-28 RX ADMIN — DICLOFENAC SODIUM 2 G: 10 GEL TOPICAL at 13:54

## 2024-02-28 ASSESSMENT — PAIN SCALES - GENERAL
PAINLEVEL_OUTOF10: 7
PAINLEVEL_OUTOF10: 4
PAINLEVEL_OUTOF10: 3
PAINLEVEL_OUTOF10: 8

## 2024-02-28 ASSESSMENT — PAIN DESCRIPTION - LOCATION
LOCATION: ABDOMEN
LOCATION: GENERALIZED

## 2024-02-28 ASSESSMENT — PAIN DESCRIPTION - ORIENTATION
ORIENTATION: LOWER
ORIENTATION: LEFT;LOWER

## 2024-02-28 ASSESSMENT — PAIN DESCRIPTION - DESCRIPTORS
DESCRIPTORS: DISCOMFORT
DESCRIPTORS: DISCOMFORT
DESCRIPTORS: ACHING
DESCRIPTORS: ACHING

## 2024-02-28 NOTE — PROGRESS NOTES
Orders received, chart reviewed and patient evaluated by occupational therapy. Pending progression with skilled acute occupational therapy, recommend:  Therapy 3 hours/day 5-7 days/week; May benefit from neuro consult for LE neuro symptoms of hypotonic L LE marianela at hip and B LE decreased distal sensation. Patient states, \"I walked 42 feet with PT, then I never could walk again. The policy of the facility is that if it takes 2 persons to move me, I have to stay in bed for their safety so I have been in bed for 6 months.\" Patient reports she was walking last summer. Hx spine surgery x 4, last one 2018. B Hip surgery 2017 with NINI stay x2.  Incontinent bladder since 2017 \"so they did neck surgery 2018 to help that but it didn't help.\" (Note MRI lumbar spine 2018 and cervical spine 2019.)     Recommend with nursing patient to complete as able in order to maintain strength, endurance and independence: OOB to chair with therapy or with Jerrell; able to sit edge of bed with staff present only with min assist. Mod/max A to return sit to supine with L LE weakness primary limitation. Thank you for your assistance.     Full evaluation to follow.    Shameka Headley OTR/L

## 2024-02-28 NOTE — PROGRESS NOTES
End of Shift Note    Bedside shift change report given to YUDELKA Bocanegra (oncoming nurse) by Connie Fernandez RN (offgoing nurse).  Report included the following information SBAR, Kardex, Intake/Output, and MAR    Shift worked:  [id number]     Shift summary and any significant changes:     Patient tolerated care fairly well, complained of abdominal pain in the morning, stated maybe be constipated, 1 dose of glycolax provided.  All scheduled meds, pt education, incontinent care, and frequent rounding provided.  Pt turned Q 2 hrs, and heels elevated.  Pt tired and sad in the afternoon.       Concerns for physician to address:       Zone phone for oncoming shift:            Connie Fernandez RN

## 2024-02-28 NOTE — PLAN OF CARE
Problem: Physical Therapy - Adult  Goal: By Discharge: Performs mobility at highest level of function for planned discharge setting.  See evaluation for individualized goals.  Description: FUNCTIONAL STATUS PRIOR TO ADMISSION: Pt reports she was bedbound, required 2-person assist for transfer to chair, reports that she has not ambulated since May. Had done PT/OT at [facility], but has not done so recently as therapist stated she had plateaued.     HOME SUPPORT PRIOR TO ADMISSION: Pt admitted from assisted living facility, had assist for all aspects of mobility     Physical Therapy Goals  Initiated 2/23/2024  1.  Patient will move from supine to sit and sit to supine, scoot up and down, and roll side to side in bed with minimal assistance within 7 day(s).    2.  Patient will maintain unsupported sitting at EOB x10 minutes with supervision within 7 day(s).   3.  Patient will be independent with home exercise program within 7 day(s).   4.  Patient will transfer from bed to chair and chair to bed with maximal assistance using the least restrictive device within 7 day(s).  Outcome: Progressing     PHYSICAL THERAPY TREATMENT    Patient: Siomara Collins (79 y.o. female)  Date: 2/28/2024  Diagnosis: UTI due to extended-spectrum beta lactamase (ESBL) producing Escherichia coli [N39.0, B96.29, Z16.12]  Complicated urinary tract infection [N39.0] UTI due to extended-spectrum beta lactamase (ESBL) producing Escherichia coli      Precautions: Fall Risk, General Precautions, Contact Precautions, Bed Alarm                      ASSESSMENT:  Patient continues to benefit from skilled PT services and is progressing towards goals. Patient tolerated session well and offered good effort. Initial supine > sit transfer performed with SBA and on second attempt required Min-Mod A. Patient demonstrates fair unsupported sitting balance with decreased trunk stability. Patient repositions hips forward with CGA and performs 2x sit <> stand transfers  with Mod A x 2. She progressed to sidestep 3' with Max A x 2 with gait pattern notable for decreased step clearance and knee stability. On further bed-level assessment, observed hypotonicity of trunk and BLE and sensory deficit distal to B thighs. Patient reports last ambulation was in May 2023. H/o 4 spinal surgeries, most recently in 2018. Patient reports recent prolonged period of bedrest at [facility] but is unsure as to why. Further neuro workup and imaging may be indicated.       PLAN:  Patient continues to benefit from skilled intervention to address the above impairments.  Continue treatment per established plan of care.    Recommendation for discharge: (in order for the patient to meet his/her long term goals): rehab    Other factors to consider for discharge: no additional factors    IF patient discharges home will need the following DME: continuing to assess with progress       SUBJECTIVE:   Patient stated, \"Do you think I am a good candidate?\" \"I am very motivated to do better\"    OBJECTIVE DATA SUMMARY:   Critical Behavior:  Orientation  Overall Orientation Status: Within Functional Limits  Orientation Level: Oriented X4  Cognition  Overall Cognitive Status: WFL  Cognition Comment: \" I saw a neurologist to see if I had dementia but I dont\"  Assessment ongoing as slight inconsistencies in medical hx, but it is a rather complex hx    Functional Mobility Training:  Bed Mobility:  Bed Mobility Training  Bed Mobility Training: Yes  Overall Level of Assistance: Minimum assistance;Maximum assistance (first try min A supine to sit having rolled to R; second try mod A supine to sit with fatigue after bed ADLs and assessment of LE AROM, sensation)  Interventions: Demonstration;Manual cues;Safety awareness training;Tactile cues;Verbal cues;Visual cues;Weight shifting training/pressure relief  Rolling: Minimum assistance;Additional time;Adaptive equipment  Supine to Sit: Minimum assistance;Moderate assistance;Additional

## 2024-02-28 NOTE — PROGRESS NOTES
INTERNAL MEDICINE PROGRESS NOTE    NAME:  Siomara Collins   :   1945   MRN:   [id number]     Date/Time:  2024 5:57 AM  Subjective:   History:  Chart reviewed and patient seen and examined and D/W her nurse this AM and all events noted. She is followed by me for HTN, HLD, DM, ILD, Chronic hypoxemic respiratory failure, DJD, Obesity and other medical problems. She has now been admitted with Klebsiella ESBL UTI and on Meropenem since .    This AM she remains very weak but denies any as focal neurologic complaints.  She was seen by PT yesterday and did fairly well.  She denies any chest pain, shortness of breath or other cardiac/respiratory complaints.  She notes no current GI or  complaints except constipation.  She notes no other complaints on complete review of systems except she does seem depressed and despondent today as apparently she and her daughter had a disagreement yesterday and her daughter told her that she would no longer speak to her and could she speak to her grandchildren.  She is concerned about the fact that her living arrangement in her current environment has her in the memory care unit which I do not think is indicated as her memory seems to be normal age-related.      Medications reviewed:  Current Facility-Administered Medications   Medication Dose Route Frequency    polyethylene glycol (GLYCOLAX) packet 17 g  17 g Oral BID    fluconazole (DIFLUCAN) tablet 150 mg  150 mg Oral Daily    diclofenac sodium (VOLTAREN) 1 % gel 2 g  2 g Topical 4x daily    insulin glargine (LANTUS) injection vial 26 Units  26 Units SubCUTAneous Daily    oxyCODONE-acetaminophen (PERCOCET) 5-325 MG per tablet 1 tablet  1 tablet Oral Q12H    calcium carbonate (TUMS) chewable tablet 1,000 mg  1,000 mg Oral 4x Daily PRN    balsum peru-castor oil (VENELEX) ointment   Topical BID    carvedilol (COREG) tablet 12.5 mg  12.5 mg Oral BID    clonazePAM (KLONOPIN) tablet 1 mg  1 mg Oral Daily    pantoprazole

## 2024-02-28 NOTE — PLAN OF CARE
Problem: Pain  Goal: Verbalizes/displays adequate comfort level or baseline comfort level  Outcome: Progressing  Flowsheets (Taken 2/27/2024 2000)  Verbalizes/displays adequate comfort level or baseline comfort level:   Encourage patient to monitor pain and request assistance   Assess pain using appropriate pain scale     Problem: ABCDS Injury Assessment  Goal: Absence of physical injury  Outcome: Progressing     Problem: Safety - Adult  Goal: Free from fall injury  Outcome: Progressing     Problem: Skin/Tissue Integrity  Goal: Absence of new skin breakdown  Description: 1.  Monitor for areas of redness and/or skin breakdown  2.  Assess vascular access sites hourly  3.  Every 4-6 hours minimum:  Change oxygen saturation probe site  4.  Every 4-6 hours:  If on nasal continuous positive airway pressure, respiratory therapy assess nares and determine need for appliance change or resting period.  Outcome: Progressing     Problem: Neurosensory - Adult  Goal: Achieves stable or improved neurological status  Outcome: Progressing  Goal: Absence of seizures  Outcome: Progressing  Goal: Remains free of injury related to seizures activity  Outcome: Progressing  Goal: Achieves maximal functionality and self care  Outcome: Progressing     Problem: Respiratory - Adult  Goal: Achieves optimal ventilation and oxygenation  Outcome: Progressing  Flowsheets (Taken 2/27/2024 2000)  Achieves optimal ventilation and oxygenation: Assess for changes in respiratory status     Problem: Cardiovascular - Adult  Goal: Maintains optimal cardiac output and hemodynamic stability  Outcome: Progressing  Flowsheets (Taken 2/27/2024 2000)  Maintains optimal cardiac output and hemodynamic stability:   Monitor urine output and notify Licensed Independent Practitioner for values outside of normal range   Monitor blood pressure and heart rate  Goal: Absence of cardiac dysrhythmias or at baseline  Outcome: Progressing     Problem: Skin/Tissue Integrity -  Adult  Goal: Skin integrity remains intact  Outcome: Progressing  Flowsheets (Taken 2/27/2024 2000)  Skin Integrity Remains Intact: Monitor for areas of redness and/or skin breakdown  Goal: Oral mucous membranes remain intact  Outcome: Progressing     Problem: Musculoskeletal - Adult  Goal: Return mobility to safest level of function  Outcome: Progressing  Goal: Maintain proper alignment of affected body part  Outcome: Progressing  Goal: Return ADL status to a safe level of function  Outcome: Progressing     Problem: Gastrointestinal - Adult  Goal: Minimal or absence of nausea and vomiting  Outcome: Progressing  Goal: Maintains or returns to baseline bowel function  Outcome: Progressing  Goal: Maintains adequate nutritional intake  Outcome: Progressing  Goal: Establish and maintain optimal ostomy function  Outcome: Progressing     Problem: Genitourinary - Adult  Goal: Absence of urinary retention  Outcome: Progressing  Flowsheets (Taken 2/27/2024 2000)  Absence of urinary retention: Assess patient’s ability to void and empty bladder  Goal: Urinary catheter remains patent  Outcome: Progressing     Problem: Infection - Adult  Goal: Absence of infection at discharge  Outcome: Progressing  Flowsheets (Taken 2/27/2024 2000)  Absence of infection at discharge: Assess and monitor for signs and symptoms of infection  Goal: Absence of infection during hospitalization  Outcome: Progressing  Goal: Absence of fever/infection during anticipated neutropenic period  Outcome: Progressing     Problem: Metabolic/Fluid and Electrolytes - Adult  Goal: Electrolytes maintained within normal limits  Outcome: Progressing  Goal: Hemodynamic stability and optimal renal function maintained  Outcome: Progressing     Problem: Hematologic - Adult  Goal: Maintains hematologic stability  Outcome: Progressing     Problem: Physical Therapy - Adult  Goal: By Discharge: Performs mobility at highest level of function for planned discharge setting.  See

## 2024-02-28 NOTE — PROGRESS NOTES
At 1648 pt's BG - 389.  Per MAR give pt 8 units and notify provider.    At 1710 - Notified provider, no new orders provided.

## 2024-02-28 NOTE — PLAN OF CARE
Problem: Occupational Therapy - Adult  Goal: By Discharge: Performs self-care activities at highest level of function for planned discharge setting.  See evaluation for individualized goals.  Description: FUNCTIONAL STATUS PRIOR TO ADMISSION:    Receives Help From:  ([facility] staff; son local), ADL Assistance: Needs assistance, Bath: Dependent/Total, Dressing: Supervision (Set up UE ADLs,mod A LE ADLs, Able to don R LE sock seated edge of bed; L LE dropped into HARSH  internal rotation/flexion when she attempted to cross L foot on R knee in supine on eval; she reports staff does LE dressing \"its faster\"), Grooming: Stand by assistance, Feeding: Modified independent , Toileting: Needs assistance (wears diapers since 2017; incontinent void; aware of BM and has control of this but home set up causes her to use diaper for staff convience),  , Ambulation Assistance: Non-ambulatory (was walking last ? May? July? Inconsistent patient report), Transfer Assistance: Needs assistance (2 person assist per facility ; they therefore cause her to stay in bed per safety policy \"1 person can help me if they know what they are doing but most dont so they just leave me in bed.\"), Active : No     HOME SUPPORT: Patient lived at [facility]. Staff assists with ADLs as noted    Occupational Therapy Goals:  Initiated 2/28/2024  1.  Patient will perform grooming S seated unsupported edge of bed B feet on floor w/o loss of balance with Supervision within 7 day(s).  2.  Patient will perform upper body dressing seated edge of bed overhead technique seated edge of bed w/<3 vc with Stand by Assist within 7 day(s).  3.  Patient will perform lower body dressing with Moderate Assist within 7 day(s).  4.  Patient will perform toilet transfers with Maximal Assist, Additional Time, and Assist x1  within 7 day(s).  5.  Patient will perform all aspects of toileting with Maximal Assist, Additional Time, and Assist x1 within 7 day(s).  6.   at a level acceptable to the patient    Activity Tolerance:   Fair , requires rest breaks, and SpO2 stable on room air    After treatment:   Patient left in no apparent distress in bed, Call bell within reach, Bed/ chair alarm activated, Side rails x3, Heels elevated for pressure relief, Updated patient's board on functional status and mobility recommendations, and patient very anxious for increased therapy participation    COMMUNICATION/EDUCATION:   The patient's plan of care was discussed with: physical therapist, registered nurse, , and certified nursing assistant/patient care technician    Patient Education  Education Given To: Patient  Education Provided: Role of Therapy;Plan of Care;Home Exercise Program;Precautions;ADL Adaptive Strategies;Transfer Training;Energy Conservation;Orientation;Equipment;Fall Prevention Strategies  Education Method: Demonstration;Verbal;Teach Back  Barriers to Learning:  (anxiety; lability/tearful with stress of being \"trapped in bed\"  \"i'm ready to go\" May benefit from neuro consult for dx and orly for support)  Education Outcome: Verbalized understanding;Demonstrated understanding;Continued education needed    Thank you for this referral.  Garret Headley, OTR/L  Minutes: 56    Occupational Therapy Evaluation Charge Determination   History Examination Decision-Making   HIGH Complexity : Extensive review of history including physical, cognitive and psychocial history  HIGH Complexity: 5 Performance deficits relating to physical, cognitive, or psychosocial skills that result in activity limitations and/or participation restrictions  HIGH Complexity: Patient presents with comorbidities that affect occupational performance.  Significant modifications of tasks or assistance (eg. physical or verbal) with assessment (s) is necessary to enable pt to complete evaluation   Based on the above components, the patient evaluation is determined to be of the following complexity

## 2024-02-29 LAB
GLUCOSE BLD STRIP.AUTO-MCNC: 185 MG/DL (ref 65–117)
GLUCOSE BLD STRIP.AUTO-MCNC: 210 MG/DL (ref 65–117)
GLUCOSE BLD STRIP.AUTO-MCNC: 248 MG/DL (ref 65–117)
GLUCOSE BLD STRIP.AUTO-MCNC: 330 MG/DL (ref 65–117)
SERVICE CMNT-IMP: ABNORMAL

## 2024-02-29 PROCEDURE — 82962 GLUCOSE BLOOD TEST: CPT

## 2024-02-29 PROCEDURE — 6360000002 HC RX W HCPCS: Performed by: INTERNAL MEDICINE

## 2024-02-29 PROCEDURE — 6370000000 HC RX 637 (ALT 250 FOR IP): Performed by: FAMILY MEDICINE

## 2024-02-29 PROCEDURE — 6370000000 HC RX 637 (ALT 250 FOR IP): Performed by: INTERNAL MEDICINE

## 2024-02-29 PROCEDURE — 2580000003 HC RX 258: Performed by: INTERNAL MEDICINE

## 2024-02-29 PROCEDURE — 97530 THERAPEUTIC ACTIVITIES: CPT

## 2024-02-29 PROCEDURE — 97116 GAIT TRAINING THERAPY: CPT

## 2024-02-29 PROCEDURE — 99233 SBSQ HOSP IP/OBS HIGH 50: CPT | Performed by: INTERNAL MEDICINE

## 2024-02-29 PROCEDURE — 97535 SELF CARE MNGMENT TRAINING: CPT

## 2024-02-29 PROCEDURE — 1100000000 HC RM PRIVATE

## 2024-02-29 RX ORDER — INSULIN GLARGINE 100 [IU]/ML
30 INJECTION, SOLUTION SUBCUTANEOUS DAILY
Status: DISCONTINUED | OUTPATIENT
Start: 2024-02-29 | End: 2024-03-04 | Stop reason: HOSPADM

## 2024-02-29 RX ADMIN — DICLOFENAC SODIUM 2 G: 10 GEL TOPICAL at 08:25

## 2024-02-29 RX ADMIN — ESCITALOPRAM OXALATE 10 MG: 10 TABLET ORAL at 08:23

## 2024-02-29 RX ADMIN — CLONAZEPAM 1 MG: 1 TABLET ORAL at 08:24

## 2024-02-29 RX ADMIN — DICLOFENAC SODIUM 2 G: 10 GEL TOPICAL at 16:43

## 2024-02-29 RX ADMIN — LISINOPRIL 10 MG: 5 TABLET ORAL at 08:25

## 2024-02-29 RX ADMIN — OXYCODONE HYDROCHLORIDE AND ACETAMINOPHEN 1 TABLET: 5; 325 TABLET ORAL at 22:11

## 2024-02-29 RX ADMIN — OXYCODONE HYDROCHLORIDE AND ACETAMINOPHEN 1 TABLET: 5; 325 TABLET ORAL at 08:23

## 2024-02-29 RX ADMIN — DICLOFENAC SODIUM 2 G: 10 GEL TOPICAL at 13:40

## 2024-02-29 RX ADMIN — CARVEDILOL 12.5 MG: 12.5 TABLET, FILM COATED ORAL at 08:23

## 2024-02-29 RX ADMIN — PRIMIDONE 50 MG: 50 TABLET ORAL at 22:11

## 2024-02-29 RX ADMIN — CARVEDILOL 12.5 MG: 12.5 TABLET, FILM COATED ORAL at 22:11

## 2024-02-29 RX ADMIN — ENOXAPARIN SODIUM 40 MG: 100 INJECTION SUBCUTANEOUS at 08:24

## 2024-02-29 RX ADMIN — DICLOFENAC SODIUM 2 G: 10 GEL TOPICAL at 22:12

## 2024-02-29 RX ADMIN — MONTELUKAST 10 MG: 10 TABLET, FILM COATED ORAL at 22:11

## 2024-02-29 RX ADMIN — MEROPENEM 1000 MG: 1 INJECTION, POWDER, FOR SOLUTION INTRAVENOUS at 06:35

## 2024-02-29 RX ADMIN — INSULIN LISPRO 2 UNITS: 100 INJECTION, SOLUTION INTRAVENOUS; SUBCUTANEOUS at 08:23

## 2024-02-29 RX ADMIN — SODIUM CHLORIDE, PRESERVATIVE FREE 10 ML: 5 INJECTION INTRAVENOUS at 08:24

## 2024-02-29 RX ADMIN — PREDNISONE 20 MG: 20 TABLET ORAL at 08:34

## 2024-02-29 RX ADMIN — INSULIN GLARGINE 30 UNITS: 100 INJECTION, SOLUTION SUBCUTANEOUS at 08:23

## 2024-02-29 RX ADMIN — MEROPENEM 1000 MG: 1 INJECTION, POWDER, FOR SOLUTION INTRAVENOUS at 22:16

## 2024-02-29 RX ADMIN — PANTOPRAZOLE SODIUM 40 MG: 40 TABLET, DELAYED RELEASE ORAL at 08:34

## 2024-02-29 RX ADMIN — LORAZEPAM 1 MG: 1 TABLET ORAL at 22:11

## 2024-02-29 RX ADMIN — Medication: at 08:26

## 2024-02-29 RX ADMIN — PRIMIDONE 50 MG: 50 TABLET ORAL at 08:34

## 2024-02-29 RX ADMIN — MEROPENEM 1000 MG: 1 INJECTION, POWDER, FOR SOLUTION INTRAVENOUS at 13:39

## 2024-02-29 RX ADMIN — INSULIN LISPRO 6 UNITS: 100 INJECTION, SOLUTION INTRAVENOUS; SUBCUTANEOUS at 16:42

## 2024-02-29 RX ADMIN — Medication: at 22:17

## 2024-02-29 ASSESSMENT — PAIN DESCRIPTION - ORIENTATION: ORIENTATION: LOWER

## 2024-02-29 ASSESSMENT — PAIN SCALES - GENERAL
PAINLEVEL_OUTOF10: 7
PAINLEVEL_OUTOF10: 7

## 2024-02-29 ASSESSMENT — PAIN DESCRIPTION - DESCRIPTORS: DESCRIPTORS: ACHING

## 2024-02-29 ASSESSMENT — PAIN DESCRIPTION - LOCATION: LOCATION: GENERALIZED

## 2024-02-29 NOTE — PLAN OF CARE
Problem: Occupational Therapy - Adult  Goal: By Discharge: Performs self-care activities at highest level of function for planned discharge setting.  See evaluation for individualized goals.  Description: FUNCTIONAL STATUS PRIOR TO ADMISSION:    Receives Help From:  ([facility] staff; son local), ADL Assistance: Needs assistance, Bath: Dependent/Total, Dressing: Supervision (Set up UE ADLs,mod A LE ADLs, Able to don R LE sock seated edge of bed; L LE dropped into HARSH  internal rotation/flexion when she attempted to cross L foot on R knee in supine on eval; she reports staff does LE dressing \"its faster\"), Grooming: Stand by assistance, Feeding: Modified independent , Toileting: Needs assistance (wears diapers since 2017; incontinent void; aware of BM and has control of this but home set up causes her to use diaper for staff convience),  , Ambulation Assistance: Non-ambulatory (was walking last ? May? July? Inconsistent patient report), Transfer Assistance: Needs assistance (2 person assist per facility ; they therefore cause her to stay in bed per safety policy \"1 person can help me if they know what they are doing but most dont so they just leave me in bed.\"), Active : No     HOME SUPPORT: Patient lived at [facility]. Staff assists with ADLs as noted    Occupational Therapy Goals:  Initiated 2/28/2024  1.  Patient will perform grooming S seated unsupported edge of bed B feet on floor w/o loss of balance with Supervision within 7 day(s).  2.  Patient will perform upper body dressing seated edge of bed overhead technique seated edge of bed w/<3 vc with Stand by Assist within 7 day(s).  3.  Patient will perform lower body dressing with Moderate Assist within 7 day(s).  4.  Patient will perform toilet transfers with Maximal Assist, Additional Time, and Assist x1  within 7 day(s).  5.  Patient will perform all aspects of toileting with Maximal Assist, Additional Time, and Assist x1 within 7 day(s).  6.

## 2024-02-29 NOTE — PROGRESS NOTES
End of Shift Note    Bedside shift change report given to YUDELKA Bocanegra (oncoming nurse) by Connie Fernandez RN (offgoing nurse).  Report included the following information SBAR, Kardex, Intake/Output, and MAR    Shift worked:  [id number]     Shift summary and any significant changes:     Patient tolerated care fairly well, complained of abdominal pain in the morning, stated maybe be constipated, 1 dose of glycolax provided and lidocaine patch applied to LLQ.  All scheduled meds, pt education, incontinent care, and frequent rounding provided. Pt turned Q 2 hrs, and heels elevated. Notified provider pt stated she is sad, provider ordered scheduled daily Lexapro, dose provided.      Concerns for physician to address:       Zone phone for oncoming shift:            Connie Fernandez RN

## 2024-02-29 NOTE — PLAN OF CARE
Problem: Pain  Goal: Verbalizes/displays adequate comfort level or baseline comfort level  2/29/2024 0918 by Katrin Cooley RN  Outcome: Progressing  2/29/2024 0054 by Sahra Baires RN  Outcome: Progressing  Flowsheets (Taken 2/28/2024 2035)  Verbalizes/displays adequate comfort level or baseline comfort level: Encourage patient to monitor pain and request assistance     Problem: ABCDS Injury Assessment  Goal: Absence of physical injury  2/29/2024 0918 by Katrin Cooley RN  Outcome: Progressing  2/29/2024 0054 by Sahra Baires RN  Outcome: Progressing     Problem: Safety - Adult  Goal: Free from fall injury  2/29/2024 0918 by Katrin Cooley RN  Outcome: Progressing  2/29/2024 0054 by Sahra Baires RN  Outcome: Progressing     Problem: Skin/Tissue Integrity  Goal: Absence of new skin breakdown  Description: 1.  Monitor for areas of redness and/or skin breakdown  2.  Assess vascular access sites hourly  3.  Every 4-6 hours minimum:  Change oxygen saturation probe site  4.  Every 4-6 hours:  If on nasal continuous positive airway pressure, respiratory therapy assess nares and determine need for appliance change or resting period.  2/29/2024 0918 by Katrin Cooley RN  Outcome: Progressing  2/29/2024 0054 by Sahra Baires RN  Outcome: Progressing     Problem: Neurosensory - Adult  Goal: Achieves stable or improved neurological status  2/29/2024 0918 by Katrin Cooley RN  Outcome: Progressing  2/29/2024 0054 by Sahra Baires RN  Outcome: Progressing  Goal: Absence of seizures  2/29/2024 0054 by Sahra Baires RN  Outcome: Progressing  Goal: Remains free of injury related to seizures activity  2/29/2024 0918 by Katrin Cooley RN  Outcome: Progressing  2/29/2024 0054 by Sahra Baires RN  Outcome: Progressing  Goal: Achieves maximal functionality and self care  2/29/2024 0054 by Sahra Baires RN  Outcome: Progressing     Problem: Respiratory - Adult  Goal: Achieves optimal ventilation

## 2024-02-29 NOTE — CARE COORDINATION
Transition of Care Plan:     RUR: 17%  Prior Level of Functioning: Assistance   Disposition: [facility] SNF    If SNF or IPR: Date FOC offered: 2/27/24  Date FOC received: 2/27/24  Accepting facility: Pending   Date authorization started with reference number:   Date authorization received and expires:   Follow up appointments: PCP  DME needed: No DME needed   Transportation at discharge: BLS   IM/IMM Medicare/ letter given: N/A   Is patient a  and connected with VA? No              If yes, was  transfer form completed and VA notified? No  Caregiver Contact: Pt's son   Discharge Caregiver contacted prior to discharge? Pt's son and [facility]  Care Conference needed? No  Barriers to discharge: Medical clearance/placement and bed       CM spoke to pt and pt's son Jimi Verdin, via phone, [phone] regarding that [facility] has denied for placement due to pt is unable to tolerate the 3 hrs of therapy.     CM informed them that their option is SNF placement. CM did provided pt's son Jimi Verdin with [facility] since pt lives at the [facility] but CM also informed pt's son there are more SNF facilities.    Pt's son will get back in touch with CM once he speak with his mother.    CM will follow and assist with d/c planning.    Yary Scott

## 2024-02-29 NOTE — PLAN OF CARE
aspects of mobility     Physical Therapy Goals  Initiated 2/23/2024  1.  Patient will move from supine to sit and sit to supine, scoot up and down, and roll side to side in bed with minimal assistance within 7 day(s).    2.  Patient will maintain unsupported sitting at EOB x10 minutes with supervision within 7 day(s).   3.  Patient will be independent with home exercise program within 7 day(s).   4.  Patient will transfer from bed to chair and chair to bed with maximal assistance using the least restrictive device within 7 day(s).  2/28/2024 1322 by Sherwin James, PT  Outcome: Progressing     Problem: Chronic Conditions and Co-morbidities  Goal: Patient's chronic conditions and co-morbidity symptoms are monitored and maintained or improved  Outcome: Progressing     Problem: Discharge Planning  Goal: Discharge to home or other facility with appropriate resources  Outcome: Progressing     Problem: Occupational Therapy - Adult  Goal: By Discharge: Performs self-care activities at highest level of function for planned discharge setting.  See evaluation for individualized goals.  Description: FUNCTIONAL STATUS PRIOR TO ADMISSION:    Receives Help From:  (senior living staff; son local), ADL Assistance: Needs assistance, Bath: Dependent/Total, Dressing: Supervision (Set up UE ADLs,mod A LE ADLs, Able to don R LE sock seated edge of bed; L LE dropped into HARSH  internal rotation/flexion when she attempted to cross L foot on R knee in supine on eval; she reports staff does LE dressing \"its faster\"), Grooming: Stand by assistance, Feeding: Modified independent , Toileting: Needs assistance (wears diapers since 2017; incontinent void; aware of BM and has control of this but home set up causes her to use diaper for staff convience),  , Ambulation Assistance: Non-ambulatory (was walking last ? May? July? Inconsistent patient report), Transfer Assistance: Needs assistance (2 person assist per facility ; they therefore cause her to stay

## 2024-02-29 NOTE — PROGRESS NOTES
INTERNAL MEDICINE PROGRESS NOTE    NAME:  Siomara Collins   :   1945   MRN:   [id number]     Date/Time:  2024 5:55 AM  Subjective:   History:  Chart reviewed and patient seen and examined and D/W her nurse this AM and all events noted. She is followed by me for HTN, HLD, DM, ILD, Chronic hypoxemic respiratory failure, DJD, Obesity and other medical problems. She has now been admitted with Klebsiella ESBL UTI and on Meropenem since .    This AM she remains weak but denies any as focal neurologic complaints.  She was seen by PT yesterday and again did fairly well.  She denies any chest pain, shortness of breath or other cardiac/respiratory complaints.  She notes no current GI or  complaints except constipation and now taking Miralax.  She notes no other complaints on complete review of systems except she does seem depressed and despondent as apparently she and her daughter had a disagreement on  and her daughter told her that she would no longer speak to her and could she speak to her grandchildren.  Lexapro started . She is concerned about the fact that her living arrangement in her current environment has her in the memory care unit which I do not think is indicated as her memory seems to be normal age-related.      Medications reviewed:  Current Facility-Administered Medications   Medication Dose Route Frequency    escitalopram (LEXAPRO) tablet 10 mg  10 mg Oral Daily    LORazepam (ATIVAN) tablet 1 mg  1 mg Oral TID PRN    polyethylene glycol (GLYCOLAX) packet 17 g  17 g Oral BID    fluconazole (DIFLUCAN) tablet 150 mg  150 mg Oral Daily    diclofenac sodium (VOLTAREN) 1 % gel 2 g  2 g Topical 4x daily    insulin glargine (LANTUS) injection vial 26 Units  26 Units SubCUTAneous Daily    oxyCODONE-acetaminophen (PERCOCET) 5-325 MG per tablet 1 tablet  1 tablet Oral Q12H    calcium carbonate (TUMS) chewable tablet 1,000 mg  1,000 mg Oral 4x Daily PRN    balsum peru-castor oil (VENELEX)  the 24 hours ending 02/29/24 6812       PHYSICAL EXAM:  General:     Alert, cooperative, no distress, appears stated age.     Head:    Normocephalic, without obvious abnormality, atraumatic.  Eyes:    Conjunctivae/corneas clear.  PERRLA  Nose:   Nares normal. No drainage or sinus tenderness.  Throat:     Lips, mucosa, and tongue normal.  No Thrush  Neck:   Supple, symmetrical,  no adenopathy, thyroid: non tender     no carotid bruit and no JVD.  Back:     Symmetric,  No CVA tenderness.  Lungs:    Clear to auscultation bilaterally.  No Wheezing or Rhonchi. No rales.  Heart:    Regular rate and rhythm,  no murmur, rub or gallop.  Abdomen:    Soft, non-tender. Not distended.  Bowel sounds normal. No masses  Extremities:  Extremities normal, atraumatic, No cyanosis.  No edema. No clubbing  Lymph nodes:  Cervical, supraclavicular normal.  Neurologic: Generalized mild decreased strength, Alert and oriented X 3.   Skin:                No rash      Lab Data Reviewed:    Recent Results (from the past 24 hour(s))   POCT Glucose    Collection Time: 02/28/24  6:47 AM   Result Value Ref Range    POC Glucose 161 (H) 65 - 117 mg/dL    Performed by: Kelley Dorado PCT    POCT Glucose    Collection Time: 02/28/24 11:36 AM   Result Value Ref Range    POC Glucose 181 (H) 65 - 117 mg/dL    Performed by: Ramona Anne PCT    POCT Glucose    Collection Time: 02/28/24  4:48 PM   Result Value Ref Range    POC Glucose 389 (H) 65 - 117 mg/dL    Performed by: Ramona Anne PCT    POCT Glucose    Collection Time: 02/28/24  8:19 PM   Result Value Ref Range    POC Glucose 309 (H) 65 - 117 mg/dL    Performed by: Fanta Steele PCT          Assessment/Plan:       Principal Problem:    UTI due to extended-spectrum beta lactamase (ESBL) producing Escherichia coli  Active Problems:    Controlled type 2 diabetes mellitus with stage 2 chronic kidney disease, with long-term current use of insulin (HCC)    Hypertension with renal disease    Primary

## 2024-02-29 NOTE — PLAN OF CARE
Problem: Physical Therapy - Adult  Goal: By Discharge: Performs mobility at highest level of function for planned discharge setting.  See evaluation for individualized goals.  Description: FUNCTIONAL STATUS PRIOR TO ADMISSION: Pt reports she was bedbound, required 2-person assist for transfer to chair, reports that she has not ambulated since May. Had done PT/OT at [facility], but has not done so recently as therapist stated she had plateaued.     HOME SUPPORT PRIOR TO ADMISSION: Pt admitted from assisted living facility, had assist for all aspects of mobility     Physical Therapy Goals  Initiated 2/23/2024  1.  Patient will move from supine to sit and sit to supine, scoot up and down, and roll side to side in bed with minimal assistance within 7 day(s).    2.  Patient will maintain unsupported sitting at EOB x10 minutes with supervision within 7 day(s).   3.  Patient will be independent with home exercise program within 7 day(s).   4.  Patient will transfer from bed to chair and chair to bed with maximal assistance using the least restrictive device within 7 day(s).  Outcome: Progressing     PHYSICAL THERAPY TREATMENT    Patient: Siomara Collins (79 y.o. female)  Date: 2/29/2024  Diagnosis: UTI due to extended-spectrum beta lactamase (ESBL) producing Escherichia coli [N39.0, B96.29, Z16.12]  Complicated urinary tract infection [N39.0] UTI due to extended-spectrum beta lactamase (ESBL) producing Escherichia coli      Precautions: Fall Risk, General Precautions, Contact Precautions, Bed Alarm (L LE +++weakness marianela at hypotonic hip, A x 2 for safe standing)                      ASSESSMENT:  Patient continues to benefit from skilled PT services and is progressing towards goals. Patient demonstrated good progress this date, progressing to step with increased foot clearance and sitting up in the chair for extended time. Patient completed series of sit <> stand transfers, initially pulling from stabilized recliner and later  rolling  Interventions: Verbal cues;Safety awareness training;Visual cues;Tactile cues  Base of Support: Narrowed;Center of gravity altered  Speed/Jessica: Shuffled;Slow  Step Length: Left shortened;Right shortened  Gait Abnormalities: Decreased step clearance;Shuffling gait;Trunk sway increased  Neuro Re-Education:     -Unsupported sitting with unilateral shoulder flexion x 10 reps  -Long arc quads                 Pain Ratin/10   Pain Intervention(s):       Activity Tolerance:   Fair  and requires rest breaks    After treatment:   Patient left in no apparent distress in bed, Call bell within reach, Bed/ chair alarm activated, Side rails x3, and Heels elevated for pressure relief      COMMUNICATION/EDUCATION:   The patient's plan of care was discussed with: occupational therapist, registered nurse, and     Patient Education  Education Given To: Patient  Education Provided: Role of Therapy;Plan of Care  Education Method: Verbal  Barriers to Learning: Cognition  Education Outcome: Verbalized understanding;Continued education needed      Sherwin James, PT  Minutes: 42

## 2024-02-29 NOTE — PROGRESS NOTES
Comprehensive Nutrition Assessment    Type and Reason for Visit:  Initial, RD Nutrition Re-Screen/LOS    Nutrition Recommendations/Plan:   4 carb choice/HANNY diet     Malnutrition Assessment:  Malnutrition Status:  Insufficient data (02/29/24 1346)      Nutrition Assessment:    Patient medically noted for UTI, DM, HTN, ILD, and chronic respiratory failure. Chart reviewed for length of stay. Patient on the phone at time of first attempted visit then had representatives from IPR at bedside on second attempt. FNS report patient has been requesting more food and RN states she ate all of her lunch and has also been consuming crackers/snacks. Will increase carb allowance to 4 choices. Encourage intake of meals.     Nutrition Related Findings:    [phone]   BM 3/28   Lexapro, Lantus, Humalog, Lisinopril, Protonix, Glycolax, prednisone   Wound Type: None       Current Nutrition Intake & Therapies:    Average Meal Intake: %  Average Supplements Intake: None Ordered  ADULT DIET; Regular; 3 carb choices (45 gm/meal); Low Sodium (2 gm)    Anthropometric Measures:  Height: 160 cm (5' 3\")  Ideal Body Weight (IBW): 115 lbs (52 kg)       Current Body Weight: 81.6 kg (179 lb 14.3 oz),   IBW.    Current BMI (kg/m2): 31.9                          BMI Categories: Obese Class 1 (BMI 30.0-34.9)    Estimated Daily Nutrient Needs:  Energy Requirements Based On: Formula  Weight Used for Energy Requirements: Current  Energy (kcal/day): 1652 kcals (BMR x 1.3AF)  Weight Used for Protein Requirements: Current  Protein (g/day): 65-82g (0.8-1.0 g/kg bw)  Method Used for Fluid Requirements: 1 ml/kcal  Fluid (ml/day): 1650 mL    Nutrition Diagnosis:   Altered nutrition-related lab values related to endocrine dysfuntion (steroid) as evidenced by  (-161-210)    Nutrition Interventions:   Food and/or Nutrient Delivery: Modify Current Diet  Nutrition Education/Counseling: No recommendation at this time  Coordination of  Nutrition Care: Continue to monitor while inpatient       Goals:     Goals: PO intake 75% or greater, by next RD assessment       Nutrition Monitoring and Evaluation:   Behavioral-Environmental Outcomes: None Identified  Food/Nutrient Intake Outcomes: Food and Nutrient Intake  Physical Signs/Symptoms Outcomes: Biochemical Data, Weight, Nutrition Focused Physical Findings    Discharge Planning:    Continue current diet     Kate Dunlap RD  Contact: ext [phone]

## 2024-03-01 LAB
GLUCOSE BLD STRIP.AUTO-MCNC: 151 MG/DL (ref 65–117)
GLUCOSE BLD STRIP.AUTO-MCNC: 203 MG/DL (ref 65–117)
GLUCOSE BLD STRIP.AUTO-MCNC: 252 MG/DL (ref 65–117)
GLUCOSE BLD STRIP.AUTO-MCNC: 269 MG/DL (ref 65–117)
SERVICE CMNT-IMP: ABNORMAL

## 2024-03-01 PROCEDURE — 6370000000 HC RX 637 (ALT 250 FOR IP): Performed by: INTERNAL MEDICINE

## 2024-03-01 PROCEDURE — 6370000000 HC RX 637 (ALT 250 FOR IP): Performed by: FAMILY MEDICINE

## 2024-03-01 PROCEDURE — 99233 SBSQ HOSP IP/OBS HIGH 50: CPT | Performed by: INTERNAL MEDICINE

## 2024-03-01 PROCEDURE — 1100000000 HC RM PRIVATE

## 2024-03-01 PROCEDURE — 97535 SELF CARE MNGMENT TRAINING: CPT

## 2024-03-01 PROCEDURE — 6360000002 HC RX W HCPCS: Performed by: INTERNAL MEDICINE

## 2024-03-01 PROCEDURE — 2580000003 HC RX 258: Performed by: INTERNAL MEDICINE

## 2024-03-01 PROCEDURE — 82962 GLUCOSE BLOOD TEST: CPT

## 2024-03-01 RX ADMIN — INSULIN LISPRO 4 UNITS: 100 INJECTION, SOLUTION INTRAVENOUS; SUBCUTANEOUS at 17:03

## 2024-03-01 RX ADMIN — ENOXAPARIN SODIUM 40 MG: 100 INJECTION SUBCUTANEOUS at 08:58

## 2024-03-01 RX ADMIN — MEROPENEM 1000 MG: 1 INJECTION, POWDER, FOR SOLUTION INTRAVENOUS at 06:06

## 2024-03-01 RX ADMIN — PRIMIDONE 50 MG: 50 TABLET ORAL at 08:57

## 2024-03-01 RX ADMIN — CLONAZEPAM 1 MG: 1 TABLET ORAL at 08:57

## 2024-03-01 RX ADMIN — PRIMIDONE 50 MG: 50 TABLET ORAL at 20:46

## 2024-03-01 RX ADMIN — LISINOPRIL 10 MG: 5 TABLET ORAL at 08:57

## 2024-03-01 RX ADMIN — MEROPENEM 1000 MG: 1 INJECTION, POWDER, FOR SOLUTION INTRAVENOUS at 21:38

## 2024-03-01 RX ADMIN — DICLOFENAC SODIUM 2 G: 10 GEL TOPICAL at 12:45

## 2024-03-01 RX ADMIN — SODIUM CHLORIDE, PRESERVATIVE FREE 10 ML: 5 INJECTION INTRAVENOUS at 20:47

## 2024-03-01 RX ADMIN — INSULIN GLARGINE 30 UNITS: 100 INJECTION, SOLUTION SUBCUTANEOUS at 08:59

## 2024-03-01 RX ADMIN — MEROPENEM 1000 MG: 1 INJECTION, POWDER, FOR SOLUTION INTRAVENOUS at 15:09

## 2024-03-01 RX ADMIN — ESCITALOPRAM OXALATE 10 MG: 10 TABLET ORAL at 08:57

## 2024-03-01 RX ADMIN — MONTELUKAST 10 MG: 10 TABLET, FILM COATED ORAL at 20:46

## 2024-03-01 RX ADMIN — CARVEDILOL 12.5 MG: 12.5 TABLET, FILM COATED ORAL at 20:47

## 2024-03-01 RX ADMIN — DICLOFENAC SODIUM 2 G: 10 GEL TOPICAL at 09:00

## 2024-03-01 RX ADMIN — SODIUM CHLORIDE, PRESERVATIVE FREE 10 ML: 5 INJECTION INTRAVENOUS at 08:59

## 2024-03-01 RX ADMIN — INSULIN LISPRO 2 UNITS: 100 INJECTION, SOLUTION INTRAVENOUS; SUBCUTANEOUS at 12:45

## 2024-03-01 RX ADMIN — OXYCODONE HYDROCHLORIDE AND ACETAMINOPHEN 1 TABLET: 5; 325 TABLET ORAL at 08:58

## 2024-03-01 RX ADMIN — CARVEDILOL 12.5 MG: 12.5 TABLET, FILM COATED ORAL at 08:57

## 2024-03-01 RX ADMIN — LORAZEPAM 1 MG: 1 TABLET ORAL at 21:00

## 2024-03-01 RX ADMIN — DICLOFENAC SODIUM 2 G: 10 GEL TOPICAL at 17:03

## 2024-03-01 RX ADMIN — DICLOFENAC SODIUM 2 G: 10 GEL TOPICAL at 20:48

## 2024-03-01 RX ADMIN — PREDNISONE 20 MG: 20 TABLET ORAL at 08:58

## 2024-03-01 RX ADMIN — OXYCODONE HYDROCHLORIDE AND ACETAMINOPHEN 1 TABLET: 5; 325 TABLET ORAL at 20:46

## 2024-03-01 RX ADMIN — SODIUM CHLORIDE: 9 INJECTION, SOLUTION INTRAVENOUS at 21:37

## 2024-03-01 RX ADMIN — PANTOPRAZOLE SODIUM 40 MG: 40 TABLET, DELAYED RELEASE ORAL at 08:57

## 2024-03-01 RX ADMIN — Medication: at 09:01

## 2024-03-01 ASSESSMENT — PAIN DESCRIPTION - LOCATION
LOCATION: ANKLE;FOOT
LOCATION: BACK;LEG

## 2024-03-01 ASSESSMENT — PAIN DESCRIPTION - ORIENTATION
ORIENTATION: RIGHT;LEFT
ORIENTATION: LOWER;RIGHT;LEFT

## 2024-03-01 ASSESSMENT — PAIN DESCRIPTION - PAIN TYPE: TYPE: CHRONIC PAIN

## 2024-03-01 ASSESSMENT — PAIN SCALES - GENERAL
PAINLEVEL_OUTOF10: 3
PAINLEVEL_OUTOF10: 4
PAINLEVEL_OUTOF10: 8

## 2024-03-01 ASSESSMENT — PAIN - FUNCTIONAL ASSESSMENT: PAIN_FUNCTIONAL_ASSESSMENT: ACTIVITIES ARE NOT PREVENTED

## 2024-03-01 ASSESSMENT — PAIN DESCRIPTION - FREQUENCY: FREQUENCY: INTERMITTENT

## 2024-03-01 ASSESSMENT — PAIN DESCRIPTION - ONSET: ONSET: ON-GOING

## 2024-03-01 ASSESSMENT — PAIN DESCRIPTION - DESCRIPTORS
DESCRIPTORS: ACHING
DESCRIPTORS: ACHING

## 2024-03-01 NOTE — PLAN OF CARE
Problem: Pain  Goal: Verbalizes/displays adequate comfort level or baseline comfort level  Outcome: Progressing     Problem: Safety - Adult  Goal: Free from fall injury  Outcome: Progressing     Problem: Skin/Tissue Integrity  Goal: Absence of new skin breakdown  Description: 1.  Monitor for areas of redness and/or skin breakdown  2.  Assess vascular access sites hourly  3.  Every 4-6 hours minimum:  Change oxygen saturation probe site  4.  Every 4-6 hours:  If on nasal continuous positive airway pressure, respiratory therapy assess nares and determine need for appliance change or resting period.  Outcome: Progressing     Problem: Skin/Tissue Integrity - Adult  Goal: Skin integrity remains intact  Recent Flowsheet Documentation  Taken 2/29/2024 2007 by Radha Saucedo RN  Skin Integrity Remains Intact: Monitor for areas of redness and/or skin breakdown

## 2024-03-01 NOTE — CARE COORDINATION
[facility] does not admit over the weekend      Transition of Care Plan:     RUR: 17%  Prior Level of Functioning: Assistance   Disposition: care home-[facility]     If SNF or IPR: Date FOC offered: 2/27/24  Date FOC received: 2/27/24  Accepting facility: [facility]  Date authorization started with reference number:   Date authorization received and expires:   Follow up appointments: PCP  DME needed: No DME needed   Transportation at discharge: BLS   IM/IMM Medicare/ letter given: N/A   Is patient a [place] and connected with VA? No              If yes, was [place] transfer form completed and VA notified? No  Caregiver Contact: Pt's son   Discharge Caregiver contacted prior to discharge? Pt's son and [facility]  Care Conference needed? No  Barriers to discharge: Medical clearance      CM received a called from pt's son regarding that pt does not want SNF placement and that pt wants to return back to the [facility]. According to pt's son the reason why pt does not want to go to a SNF is that she does not want to have a different MD.    SANCHO spoke with Ashok ([phone]) at the [facility] about the pt having a potential of d/c on Monday.    CM also reach out to Dr. Vang regarding pt's d/c date. Left a message for Dr. Vang to call SANCHO back.    CM will follow and assist with d/c planning.    Yary Scott

## 2024-03-01 NOTE — PROGRESS NOTES
carbonate (TUMS) chewable tablet 1,000 mg  1,000 mg Oral 4x Daily PRN    balsum peru-castor oil (VENELEX) ointment   Topical BID    carvedilol (COREG) tablet 12.5 mg  12.5 mg Oral BID    clonazePAM (KLONOPIN) tablet 1 mg  1 mg Oral Daily    pantoprazole (PROTONIX) tablet 40 mg  40 mg Oral Daily    [Held by provider] rosuvastatin (CRESTOR) tablet 20 mg  20 mg Oral Nightly    lisinopril (PRINIVIL;ZESTRIL) tablet 10 mg  10 mg Oral Daily    montelukast (SINGULAIR) tablet 10 mg  10 mg Oral Nightly    predniSONE (DELTASONE) tablet 20 mg  20 mg Oral Daily    primidone (MYSOLINE) tablet 50 mg  50 mg Oral BID    sodium chloride flush 0.9 % injection 5-40 mL  5-40 mL IntraVENous 2 times per day    sodium chloride flush 0.9 % injection 5-40 mL  5-40 mL IntraVENous PRN    0.9 % sodium chloride infusion   IntraVENous PRN    enoxaparin (LOVENOX) injection 40 mg  40 mg SubCUTAneous Daily    ondansetron (ZOFRAN-ODT) disintegrating tablet 4 mg  4 mg Oral Q8H PRN    Or    ondansetron (ZOFRAN) injection 4 mg  4 mg IntraVENous Q6H PRN    polyethylene glycol (GLYCOLAX) packet 17 g  17 g Oral Daily PRN    acetaminophen (TYLENOL) tablet 650 mg  650 mg Oral Q6H PRN    Or    acetaminophen (TYLENOL) suppository 650 mg  650 mg Rectal Q6H PRN    lidocaine 4 % external patch 1 patch  1 patch TransDERmal Daily    glucose chewable tablet 16 g  4 tablet Oral PRN    dextrose bolus 10% 125 mL  125 mL IntraVENous PRN    Or    dextrose bolus 10% 250 mL  250 mL IntraVENous PRN    glucagon injection 1 mg  1 mg SubCUTAneous PRN    dextrose 10 % infusion   IntraVENous Continuous PRN    insulin lispro (HUMALOG) injection vial 0-8 Units  0-8 Units SubCUTAneous TID WC    insulin lispro (HUMALOG) injection vial 0-4 Units  0-4 Units SubCUTAneous Nightly        Objective:   Vitals:  /73   Pulse 73   Temp 98.1 °F (36.7 °C) (Oral)   Resp 18   Ht 1.6 m (5' 3\")   Wt 81.6 kg (180 lb)   SpO2 97%   BMI 31.89 kg/m²      Temp (24hrs), Av.4 °F (36.9 °C),  coli  Active Problems:    Controlled type 2 diabetes mellitus with stage 2 chronic kidney disease, with long-term current use of insulin (HCC)    Hypertension with renal disease    Primary osteoarthritis involving multiple joints    Interstitial lung disease (HCC)    Chronic hypoxemic respiratory failure (HCC)    Polymyalgia rheumatica (HCC)    Neuropathy    Complicated urinary tract infection  Resolved Problems:    * No resolved hospital problems. *          ___________________________________________________  PLAN:    1.  Continue Meropenem for a full course (D#9)  2.  DM continue Lantus (dose increase today) and follow BS and treat with SSI - adjusted today  3.  Received PO Diflucan for Candida in urine as well with 5-day course comleted  4.  Continue Prednisone for PMR  5.  Chronic pain on Voltaren Gel with PRN Percocet  6.  Neuropathy and LE weakness so PT   7   Hypertension continue Coreg and lisinopril  8.  GERD continue Protonix  9.  Constipation increased MiraLAX to twice daily  10.  Patient's complaint of being on memory care unit at her living facility and I agree she does not need to be on the memory care unit  11.  Depression I added Lexapro 10 mg daily.  I also ordered some Ativan on a as needed basis with her extreme anxiety.    Continue other medications reviewed with her.    50 Minutes spent today in direct care of this high complexity patient with greater than 50% in counseling and coordination of care.    If need to contact me use hospital  [phone], DO NOT USE PERFECT SERVE    ___________________________________________________    Attending Physician: Irvin Vang MD

## 2024-03-01 NOTE — PLAN OF CARE
of bed there.\" Patient wondering what is cause of this weakness in her hips L worse than R         PLAN :  Patient continues to benefit from skilled intervention to address the above impairments.  Continue treatment per established plan of care to address goals.    Recommend with staff: Jerrell to chair as able    Recommend next OT session: cog screen; LE ADLs    Recommendation for discharge: (in order for the patient to meet his/her long term goals): SNF with recommendation for goal of IPR; continue to recommend assessment of spine/LE proximal weakness; may benefit from neurology consult for spinal assessment \"They did MRI of my brain; I do not have dementia.\"  Other factors to consider for discharge: high risk for falls, not safe to be alone, and hypotonic proximal LE  L more affected than R; impaired sensation sock pattern with hx peripheral neuropathy B LE    [facility] is a \"NO lift\" facility (per Ashok) so if patient returns there she is not allowed to get out of bed    IF patient discharges home will need the following DME: hospital bed, mechanical lift, transfer bench, and wheelchair 18 inch       SUBJECTIVE:   Patient stated “I want to get up in the chair.”    OBJECTIVE DATA SUMMARY:   Cognitive/Behavioral Status:     Cognition  Cognition Comment: \" I saw a neurologist to see if I had dementia but I dont\"  Assessment ongoing as slight inconsistencies in medical hx, but it is a rather complex hx; son states patient has full medical care decisions for her own care    Functional Mobility and Transfers for ADLs:  Bed Mobility:  Bed Mobility Training  Bed Mobility Training: Yes  Overall Level of Assistance: Moderate assistance;Assist X2;Adaptive equipment;Additional time  Interventions: Demonstration;Manual cues;Safety awareness training;Tactile cues;Verbal cues;Visual cues;Weight shifting training/pressure relief  Rolling: Minimum assistance;Moderate assistance;Assist X2;Adaptive equipment;Additional  time  Supine to Sit: Minimum assistance;Moderate assistance;Additional time;Adaptive equipment  Sit to Supine: Maximum assistance;Additional time;Adaptive equipment (most limited by proximal LE weakness)  Scooting: Maximum assistance;Moderate assistance;Additional time;Assist X2;Adaptive equipment              Balance:     Balance  Sitting: Impaired  Sitting - Static: Good (unsupported);Fair (occasional)  Sitting - Dynamic: Fair (occasional)      ADL Intervention:                                    LE Bathing: Maximum assistance  LE Bathing Skilled Clinical Factors: most limited by proximal LE weakness; using UEs to manage L LE against gravity    UE Dressing: Setup;Minimal assistance       LE Dressing: Maximum assistance;Increased time to complete;Dependent/Total  LE Dressing Skilled Clinical Factors: may benefit from LE AE    Toileting: Dependent/Total  Toileting Skilled Clinical Factors: incontinent BM today      Pain Ratin/10 rectal pain  Pain Intervention(s):   nursing notified and addressing, rest, repositioning, and hygiene assist      Activity Tolerance:   Fair , requires rest breaks, and SpO2 stable on room air  Please refer to the flowsheet for vital signs taken during this treatment.    After treatment:   Patient left in no apparent distress in bed, Call bell within reach, Bed/ chair alarm activated, Side rails x3, Heels elevated for pressure relief, and Updated patient's board on functional status and mobility recommendations    COMMUNICATION/EDUCATION:   The patient's plan of care was discussed with: physical therapist, registered nurse, and certified nursing assistant/patient care technician    Patient Education  Education Provided: Role of Therapy;Plan of Care;Home Exercise Program;Precautions;ADL Adaptive Strategies;Transfer Training;Energy Conservation;Orientation;Equipment;Fall Prevention Strategies  Education Method: Demonstration;Verbal;Teach Back  Barriers to Learning: Cognition;Lack of

## 2024-03-02 LAB
GLUCOSE BLD STRIP.AUTO-MCNC: 136 MG/DL (ref 65–117)
GLUCOSE BLD STRIP.AUTO-MCNC: 245 MG/DL (ref 65–117)
GLUCOSE BLD STRIP.AUTO-MCNC: 253 MG/DL (ref 65–117)
GLUCOSE BLD STRIP.AUTO-MCNC: 362 MG/DL (ref 65–117)
SERVICE CMNT-IMP: ABNORMAL

## 2024-03-02 PROCEDURE — 6360000002 HC RX W HCPCS: Performed by: INTERNAL MEDICINE

## 2024-03-02 PROCEDURE — 1100000000 HC RM PRIVATE

## 2024-03-02 PROCEDURE — 2580000003 HC RX 258: Performed by: INTERNAL MEDICINE

## 2024-03-02 PROCEDURE — 99233 SBSQ HOSP IP/OBS HIGH 50: CPT | Performed by: INTERNAL MEDICINE

## 2024-03-02 PROCEDURE — 82962 GLUCOSE BLOOD TEST: CPT

## 2024-03-02 PROCEDURE — 6370000000 HC RX 637 (ALT 250 FOR IP): Performed by: INTERNAL MEDICINE

## 2024-03-02 PROCEDURE — 6370000000 HC RX 637 (ALT 250 FOR IP): Performed by: FAMILY MEDICINE

## 2024-03-02 RX ADMIN — ENOXAPARIN SODIUM 40 MG: 100 INJECTION SUBCUTANEOUS at 09:20

## 2024-03-02 RX ADMIN — DICLOFENAC SODIUM 2 G: 10 GEL TOPICAL at 20:51

## 2024-03-02 RX ADMIN — Medication: at 20:52

## 2024-03-02 RX ADMIN — LISINOPRIL 10 MG: 5 TABLET ORAL at 09:21

## 2024-03-02 RX ADMIN — PREDNISONE 20 MG: 20 TABLET ORAL at 09:17

## 2024-03-02 RX ADMIN — PRIMIDONE 50 MG: 50 TABLET ORAL at 09:17

## 2024-03-02 RX ADMIN — LORAZEPAM 1 MG: 1 TABLET ORAL at 20:57

## 2024-03-02 RX ADMIN — SODIUM CHLORIDE: 9 INJECTION, SOLUTION INTRAVENOUS at 05:36

## 2024-03-02 RX ADMIN — SODIUM CHLORIDE, PRESERVATIVE FREE 10 ML: 5 INJECTION INTRAVENOUS at 09:22

## 2024-03-02 RX ADMIN — CARVEDILOL 12.5 MG: 12.5 TABLET, FILM COATED ORAL at 20:49

## 2024-03-02 RX ADMIN — CARVEDILOL 12.5 MG: 12.5 TABLET, FILM COATED ORAL at 09:17

## 2024-03-02 RX ADMIN — ESCITALOPRAM OXALATE 10 MG: 10 TABLET ORAL at 09:17

## 2024-03-02 RX ADMIN — MEROPENEM 1000 MG: 1 INJECTION, POWDER, FOR SOLUTION INTRAVENOUS at 05:37

## 2024-03-02 RX ADMIN — Medication: at 09:27

## 2024-03-02 RX ADMIN — MEROPENEM 1000 MG: 1 INJECTION, POWDER, FOR SOLUTION INTRAVENOUS at 21:04

## 2024-03-02 RX ADMIN — CLONAZEPAM 1 MG: 1 TABLET ORAL at 09:17

## 2024-03-02 RX ADMIN — PRIMIDONE 50 MG: 50 TABLET ORAL at 20:49

## 2024-03-02 RX ADMIN — MEROPENEM 1000 MG: 1 INJECTION, POWDER, FOR SOLUTION INTRAVENOUS at 13:39

## 2024-03-02 RX ADMIN — INSULIN LISPRO 8 UNITS: 100 INJECTION, SOLUTION INTRAVENOUS; SUBCUTANEOUS at 17:06

## 2024-03-02 RX ADMIN — INSULIN GLARGINE 30 UNITS: 100 INJECTION, SOLUTION SUBCUTANEOUS at 09:20

## 2024-03-02 RX ADMIN — INSULIN LISPRO 2 UNITS: 100 INJECTION, SOLUTION INTRAVENOUS; SUBCUTANEOUS at 12:14

## 2024-03-02 RX ADMIN — DICLOFENAC SODIUM 2 G: 10 GEL TOPICAL at 09:20

## 2024-03-02 RX ADMIN — SODIUM CHLORIDE, PRESERVATIVE FREE 10 ML: 5 INJECTION INTRAVENOUS at 20:52

## 2024-03-02 RX ADMIN — OXYCODONE HYDROCHLORIDE AND ACETAMINOPHEN 1 TABLET: 5; 325 TABLET ORAL at 20:49

## 2024-03-02 RX ADMIN — PANTOPRAZOLE SODIUM 40 MG: 40 TABLET, DELAYED RELEASE ORAL at 09:20

## 2024-03-02 RX ADMIN — DICLOFENAC SODIUM 2 G: 10 GEL TOPICAL at 17:14

## 2024-03-02 RX ADMIN — MONTELUKAST 10 MG: 10 TABLET, FILM COATED ORAL at 20:49

## 2024-03-02 RX ADMIN — OXYCODONE HYDROCHLORIDE AND ACETAMINOPHEN 1 TABLET: 5; 325 TABLET ORAL at 09:17

## 2024-03-02 RX ADMIN — DICLOFENAC SODIUM 2 G: 10 GEL TOPICAL at 12:14

## 2024-03-02 ASSESSMENT — PAIN SCALES - GENERAL
PAINLEVEL_OUTOF10: 8
PAINLEVEL_OUTOF10: 8
PAINLEVEL_OUTOF10: 5

## 2024-03-02 ASSESSMENT — PAIN DESCRIPTION - DESCRIPTORS
DESCRIPTORS: PINS AND NEEDLES

## 2024-03-02 ASSESSMENT — PAIN DESCRIPTION - DIRECTION: RADIATING_TOWARDS: DOWN THE LEG

## 2024-03-02 ASSESSMENT — PAIN DESCRIPTION - ONSET: ONSET: ON-GOING

## 2024-03-02 ASSESSMENT — PAIN DESCRIPTION - LOCATION
LOCATION: BACK;FOOT;LEG
LOCATION: BACK;LEG
LOCATION: BACK;FOOT;LEG

## 2024-03-02 ASSESSMENT — PAIN DESCRIPTION - ORIENTATION
ORIENTATION: LEFT;RIGHT;LOWER
ORIENTATION: RIGHT;LEFT;LOWER
ORIENTATION: LEFT;RIGHT;LOWER

## 2024-03-02 ASSESSMENT — PAIN DESCRIPTION - FREQUENCY: FREQUENCY: INTERMITTENT

## 2024-03-02 ASSESSMENT — PAIN - FUNCTIONAL ASSESSMENT: PAIN_FUNCTIONAL_ASSESSMENT: PREVENTS OR INTERFERES SOME ACTIVE ACTIVITIES AND ADLS

## 2024-03-02 ASSESSMENT — PAIN DESCRIPTION - PAIN TYPE: TYPE: CHRONIC PAIN

## 2024-03-02 NOTE — PROGRESS NOTES
INTERNAL MEDICINE PROGRESS NOTE    NAME:  Siomara Collins   :   1945   MRN:   [id number]     Date/Time:  3/2/2024 7:59 AM  Subjective:   History:  Chart reviewed and patient seen and examined and D/W her nurse this AM and all events noted. She is followed by me for HTN, HLD, DM, ILD, Chronic hypoxemic respiratory failure, DJD, Obesity and other medical problems. She has now been admitted with Klebsiella ESBL UTI and on Meropenem since .    This AM she remains weak without change and without any other neurologic complaints.  She was seen by OT yesterday and still noted to be very weak and they are still recommending inpatient rehab.  She denies any chest pain, shortness of breath or other cardiac/respiratory complaints.  She notes no current GI or  complaints except constipation and now taking Miralax (declined last night).  She notes no other complaints on complete review of systems except she does seem depressed and despondent as apparently she and her daughter had a disagreement on  and her daughter told her that she would no longer speak to her and could she speak to her grandchildren.  Lexapro started . She is concerned about the fact that her living arrangement in her current environment has her in the memory care unit which I do not think is indicated as her memory seems to be normal age-related.      Medications reviewed:  Current Facility-Administered Medications   Medication Dose Route Frequency    insulin glargine (LANTUS) injection vial 30 Units  30 Units SubCUTAneous Daily    meropenem (MERREM) 1,000 mg in sodium chloride 0.9 % 100 mL IVPB (mini-bag)  1,000 mg IntraVENous Q8H    escitalopram (LEXAPRO) tablet 10 mg  10 mg Oral Daily    LORazepam (ATIVAN) tablet 1 mg  1 mg Oral TID PRN    polyethylene glycol (GLYCOLAX) packet 17 g  17 g Oral BID    diclofenac sodium (VOLTAREN) 1 % gel 2 g  2 g Topical 4x daily    oxyCODONE-acetaminophen (PERCOCET) 5-325 MG per tablet 1 tablet  1 tablet  producing Escherichia coli  Active Problems:    Controlled type 2 diabetes mellitus with stage 2 chronic kidney disease, with long-term current use of insulin (HCC)    Hypertension with renal disease    Primary osteoarthritis involving multiple joints    Interstitial lung disease (HCC)    Chronic hypoxemic respiratory failure (HCC)    Polymyalgia rheumatica (HCC)    Neuropathy    Complicated urinary tract infection  Resolved Problems:    * No resolved hospital problems. *          ___________________________________________________  PLAN:    1.  Continue Meropenem for a full course (D#10), D/C it after today 3/2  2.  DM continue Lantus (dose increase 2/29) and follow BS and treat with SSI - adjusted 2/29  3.  Received PO Diflucan for Candida in urine with 5-day course comleted  4.  Continue Prednisone for PMR  5.  Chronic pain on Voltaren Gel with PRN Percocet  6.  Neuropathy and LE weakness so PT   7   Hypertension continue Coreg and lisinopril  8.  GERD continue Protonix  9.  Constipation increased MiraLAX to twice daily  10.  Patient's complaint of being on memory care unit at her living facility and I agree she does not need to be on the memory care unit  11.  Depression I added Lexapro 10 mg daily on 2/28.  I also ordered some Ativan on a as needed basis with her extreme anxiety.    Continue other medications reviewed with her.    50 Minutes spent today in direct care of this high complexity patient with greater than 50% in counseling and coordination of care.    If need to contact me use hospital  [phone], DO NOT USE PERFECT SERVE    ___________________________________________________    Attending Physician: Irvin Vang MD

## 2024-03-02 NOTE — PLAN OF CARE
Problem: Pain  Goal: Verbalizes/displays adequate comfort level or baseline comfort level  Outcome: Progressing     Problem: Skin/Tissue Integrity  Goal: Absence of new skin breakdown  Description: 1.  Monitor for areas of redness and/or skin breakdown  2.  Assess vascular access sites hourly  3.  Every 4-6 hours minimum:  Change oxygen saturation probe site  4.  Every 4-6 hours:  If on nasal continuous positive airway pressure, respiratory therapy assess nares and determine need for appliance change or resting period.  Outcome: Progressing     Problem: Skin/Tissue Integrity - Adult  Goal: Skin integrity remains intact  Outcome: Progressing     Problem: Safety - Adult  Goal: Free from fall injury  Outcome: Progressing

## 2024-03-02 NOTE — PROGRESS NOTES
End of Shift Note    Bedside shift change report given to Gume (oncoming nurse) by Kiersten Osorio RN (offgoing nurse).  Report included the following information SBAR, Kardex, and MAR    Shift worked:  7p-7a     Shift summary and any significant changes:     Humalog was held due to the patients blood glucose level, see MAR.  Patient refused Miralax, see MAR.  A new IV was established.  Patient did have a bowel movement during my shift.  Patient was repositioned during my shift.  Patient teaching and routine rounding has been done.        Concerns for physician to address:       Zone phone for oncoming shift:          Activity:     Number times ambulated in hallways past shift: 0  Number of times OOB to chair past shift: 0    Cardiac:   Cardiac Monitoring: No           Access:  Current line(s): PIV     Genitourinary:   Urinary status: voiding and external catheter    Respiratory:      Chronic home O2 use?: NO  Incentive spirometer at bedside: NO       GI:     Current diet:  ADULT DIET; Regular; 4 carb choices (60 gm/meal); No Added Salt (3-4 gm)  Passing flatus: YES  Tolerating current diet: YES       Pain Management:   Patient states pain is manageable on current regimen: YES    Skin:     Interventions: float heels and internal/external urinary devices    Patient Safety:  Fall Score:    Interventions: bed/chair alarm and pt to call before getting OOB       Length of Stay:  Expected LOS: 12  Actual LOS: 4      Kiersten Osorio RN

## 2024-03-02 NOTE — PROGRESS NOTES
Bedside shift change report given to YUDELKA Burch (oncoming nurse) by YUDELKA Kelley (offgoing nurse). Report included the following information Nurse Handoff Report, Index, ED Encounter Summary, ED SBAR, Adult Overview, Intake/Output, MAR, Recent Results, Med Rec Status, , Alarm Parameters, Quality Measures, and Neuro Assessment. Patient reported pain in bilateral lower extremities, interventions provided to alleviate pain. Patient is awake, alert, oriented and currently resting with no sign of distress.

## 2024-03-03 LAB
GLUCOSE BLD STRIP.AUTO-MCNC: 173 MG/DL (ref 65–117)
GLUCOSE BLD STRIP.AUTO-MCNC: 208 MG/DL (ref 65–117)
GLUCOSE BLD STRIP.AUTO-MCNC: 267 MG/DL (ref 65–117)
GLUCOSE BLD STRIP.AUTO-MCNC: 282 MG/DL (ref 65–117)
SERVICE CMNT-IMP: ABNORMAL

## 2024-03-03 PROCEDURE — 6360000002 HC RX W HCPCS: Performed by: INTERNAL MEDICINE

## 2024-03-03 PROCEDURE — 6370000000 HC RX 637 (ALT 250 FOR IP): Performed by: INTERNAL MEDICINE

## 2024-03-03 PROCEDURE — 2580000003 HC RX 258: Performed by: INTERNAL MEDICINE

## 2024-03-03 PROCEDURE — 1100000000 HC RM PRIVATE

## 2024-03-03 PROCEDURE — 6370000000 HC RX 637 (ALT 250 FOR IP): Performed by: FAMILY MEDICINE

## 2024-03-03 PROCEDURE — 82962 GLUCOSE BLOOD TEST: CPT

## 2024-03-03 PROCEDURE — 99233 SBSQ HOSP IP/OBS HIGH 50: CPT | Performed by: INTERNAL MEDICINE

## 2024-03-03 RX ADMIN — DICLOFENAC SODIUM 2 G: 10 GEL TOPICAL at 20:51

## 2024-03-03 RX ADMIN — LORAZEPAM 1 MG: 1 TABLET ORAL at 20:54

## 2024-03-03 RX ADMIN — ENOXAPARIN SODIUM 40 MG: 100 INJECTION SUBCUTANEOUS at 09:08

## 2024-03-03 RX ADMIN — INSULIN GLARGINE 30 UNITS: 100 INJECTION, SOLUTION SUBCUTANEOUS at 09:09

## 2024-03-03 RX ADMIN — PRIMIDONE 50 MG: 50 TABLET ORAL at 09:09

## 2024-03-03 RX ADMIN — OXYCODONE HYDROCHLORIDE AND ACETAMINOPHEN 1 TABLET: 5; 325 TABLET ORAL at 20:54

## 2024-03-03 RX ADMIN — MONTELUKAST 10 MG: 10 TABLET, FILM COATED ORAL at 20:54

## 2024-03-03 RX ADMIN — LISINOPRIL 10 MG: 5 TABLET ORAL at 09:21

## 2024-03-03 RX ADMIN — SODIUM CHLORIDE, PRESERVATIVE FREE 10 ML: 5 INJECTION INTRAVENOUS at 09:10

## 2024-03-03 RX ADMIN — OXYCODONE HYDROCHLORIDE AND ACETAMINOPHEN 1 TABLET: 5; 325 TABLET ORAL at 09:09

## 2024-03-03 RX ADMIN — PRIMIDONE 50 MG: 50 TABLET ORAL at 20:54

## 2024-03-03 RX ADMIN — DICLOFENAC SODIUM 2 G: 10 GEL TOPICAL at 09:13

## 2024-03-03 RX ADMIN — CLONAZEPAM 1 MG: 1 TABLET ORAL at 09:08

## 2024-03-03 RX ADMIN — Medication: at 09:13

## 2024-03-03 RX ADMIN — LORAZEPAM 1 MG: 1 TABLET ORAL at 10:34

## 2024-03-03 RX ADMIN — MEROPENEM 1000 MG: 1 INJECTION, POWDER, FOR SOLUTION INTRAVENOUS at 05:39

## 2024-03-03 RX ADMIN — DICLOFENAC SODIUM 2 G: 10 GEL TOPICAL at 17:10

## 2024-03-03 RX ADMIN — INSULIN LISPRO 2 UNITS: 100 INJECTION, SOLUTION INTRAVENOUS; SUBCUTANEOUS at 11:39

## 2024-03-03 RX ADMIN — DICLOFENAC SODIUM 2 G: 10 GEL TOPICAL at 11:40

## 2024-03-03 RX ADMIN — PANTOPRAZOLE SODIUM 40 MG: 40 TABLET, DELAYED RELEASE ORAL at 09:09

## 2024-03-03 RX ADMIN — CARVEDILOL 12.5 MG: 12.5 TABLET, FILM COATED ORAL at 20:54

## 2024-03-03 RX ADMIN — Medication: at 20:51

## 2024-03-03 RX ADMIN — ESCITALOPRAM OXALATE 10 MG: 10 TABLET ORAL at 09:10

## 2024-03-03 RX ADMIN — PREDNISONE 20 MG: 20 TABLET ORAL at 09:08

## 2024-03-03 RX ADMIN — INSULIN LISPRO 4 UNITS: 100 INJECTION, SOLUTION INTRAVENOUS; SUBCUTANEOUS at 17:06

## 2024-03-03 RX ADMIN — SODIUM CHLORIDE, PRESERVATIVE FREE 10 ML: 5 INJECTION INTRAVENOUS at 20:50

## 2024-03-03 ASSESSMENT — PAIN DESCRIPTION - DESCRIPTORS
DESCRIPTORS: PINS AND NEEDLES;ACHING
DESCRIPTORS: PINS AND NEEDLES

## 2024-03-03 ASSESSMENT — PAIN DESCRIPTION - ORIENTATION
ORIENTATION: RIGHT;LEFT;LOWER
ORIENTATION: LEFT;RIGHT;LOWER

## 2024-03-03 ASSESSMENT — PAIN SCALES - GENERAL
PAINLEVEL_OUTOF10: 0
PAINLEVEL_OUTOF10: 6
PAINLEVEL_OUTOF10: 0
PAINLEVEL_OUTOF10: 4
PAINLEVEL_OUTOF10: 8

## 2024-03-03 ASSESSMENT — PAIN - FUNCTIONAL ASSESSMENT: PAIN_FUNCTIONAL_ASSESSMENT: PREVENTS OR INTERFERES SOME ACTIVE ACTIVITIES AND ADLS

## 2024-03-03 ASSESSMENT — PAIN DESCRIPTION - FREQUENCY: FREQUENCY: INTERMITTENT

## 2024-03-03 ASSESSMENT — PAIN DESCRIPTION - LOCATION
LOCATION: LEG;BACK
LOCATION: BACK;LEG;FOOT

## 2024-03-03 ASSESSMENT — PAIN SCALES - WONG BAKER: WONGBAKER_NUMERICALRESPONSE: 0

## 2024-03-03 ASSESSMENT — PAIN DESCRIPTION - ONSET: ONSET: ON-GOING

## 2024-03-03 ASSESSMENT — PAIN DESCRIPTION - PAIN TYPE: TYPE: CHRONIC PAIN

## 2024-03-03 NOTE — PROGRESS NOTES
INTERNAL MEDICINE PROGRESS NOTE    NAME:  Siomara Collins   :   1945   MRN:   [id number]     Date/Time:  3/3/2024 7:47 AM  Subjective:   History:  Chart reviewed and patient seen and examined and D/W her nurse this AM and all events noted. She is followed by me for HTN, HLD, DM, ILD, Chronic hypoxemic respiratory failure, DJD, Obesity and other medical problems. She has now been admitted with Klebsiella ESBL UTI and on Meropenem since  with completion of 10 days 3/3 AM  .    This AM she remains weak without change and without any other neurologic complaints.  She was seen by OT on 3/1 and still noted to be very weak and they are still recommending inpatient rehab.  She denies any chest pain, shortness of breath or other cardiac/respiratory complaints.  She notes no current GI or  complaints except constipation and now taking Miralax (declined last night).  She notes no other complaints on complete review of systems except she does seem depressed and despondent as apparently she and her daughter had a disagreement on  and her daughter told her that she would no longer speak to her and she could not speak to her grandchildren.  Lexapro started . She is concerned about the fact that her living arrangement in her current environment has her in the memory care unit which I do not think is indicated as her memory seems to be normal age-related.      Medications reviewed:  Current Facility-Administered Medications   Medication Dose Route Frequency    insulin glargine (LANTUS) injection vial 30 Units  30 Units SubCUTAneous Daily    escitalopram (LEXAPRO) tablet 10 mg  10 mg Oral Daily    LORazepam (ATIVAN) tablet 1 mg  1 mg Oral TID PRN    polyethylene glycol (GLYCOLAX) packet 17 g  17 g Oral BID    diclofenac sodium (VOLTAREN) 1 % gel 2 g  2 g Topical 4x daily    oxyCODONE-acetaminophen (PERCOCET) 5-325 MG per tablet 1 tablet  1 tablet Oral Q12H    calcium carbonate (TUMS) chewable tablet 1,000 mg

## 2024-03-03 NOTE — PLAN OF CARE
Problem: Infection - Adult  Goal: Absence of infection at discharge  Outcome: Progressing  Flowsheets (Taken 3/2/2024 2219)  Absence of infection at discharge:   Assess and monitor for signs and symptoms of infection   Monitor lab/diagnostic results   Monitor all insertion sites i.e., indwelling lines, tubes and drains   Administer medications as ordered   Identify and instruct in appropriate isolation precautions for identified infection/condition     Problem: Skin/Tissue Integrity - Adult  Goal: Skin integrity remains intact  3/2/2024 2219 by Danitza Rios RN  Outcome: Progressing  Flowsheets  Taken 3/2/2024 2219  Skin Integrity Remains Intact:   Monitor for areas of redness and/or skin breakdown   Assess vascular access sites hourly   Every 4-6 hours: If on nasal continuous positive airway pressure, respiratory therapy assesses nares and determine need for appliance change or resting period  Taken 3/2/2024 2119  Skin Integrity Remains Intact: Monitor for areas of redness and/or skin breakdown  3/2/2024 1059 by Gume Cristobal RN  Outcome: Progressing     Problem: Musculoskeletal - Adult  Goal: Return mobility to safest level of function  Outcome: Progressing  Flowsheets (Taken 3/2/2024 2219)  Return Mobility to Safest Level of Function:   Assess patient stability and activity tolerance for standing, transferring and ambulating with or without assistive devices   Assist with transfers and ambulation using safe patient handling equipment as needed     Problem: Pain  Goal: Verbalizes/displays adequate comfort level or baseline comfort level  3/2/2024 2219 by Danitza Rios RN  Outcome: Progressing  3/2/2024 1059 by Gume Cristobal RN  Outcome: Progressing

## 2024-03-03 NOTE — PROGRESS NOTES
End of Shift Note    Bedside shift change report given to Gume (oncoming nurse) by Danitza Rios RN (offgoing nurse).  Report included the following information Nurse Handoff Report, Intake/Output, MAR, and Recent Results      Shift worked:  1900 - 0700   Shift summary and any significant changes:     Pt has been turning and repositioning self in bed. Pain controlled with Percocet BID. Pt did not take Miralax last night, had 2 loose BMs. Received all abx as per order.      Concerns for physician to address:  D/C Abx?   Zone phone for oncoming shift:   [phone]     Danitza Rios, RN

## 2024-03-03 NOTE — PLAN OF CARE
Problem: Pain  Goal: Verbalizes/displays adequate comfort level or baseline comfort level  3/3/2024 1044 by Gume Cristobal RN  Outcome: Progressing  3/2/2024 2219 by Danitza Rios RN  Outcome: Progressing     Problem: Skin/Tissue Integrity  Goal: Absence of new skin breakdown  Description: 1.  Monitor for areas of redness and/or skin breakdown  2.  Assess vascular access sites hourly  3.  Every 4-6 hours minimum:  Change oxygen saturation probe site  4.  Every 4-6 hours:  If on nasal continuous positive airway pressure, respiratory therapy assess nares and determine need for appliance change or resting period.  3/3/2024 1044 by Gume Cristobal RN  Outcome: Progressing  3/2/2024 2219 by Danitza Rios RN  Outcome: Progressing     Problem: Musculoskeletal - Adult  Goal: Return mobility to safest level of function  3/3/2024 1044 by Gume Cristobal RN  Outcome: Progressing  3/2/2024 2219 by Danitza Rios RN  Outcome: Progressing  Flowsheets (Taken 3/2/2024 2219)  Return Mobility to Safest Level of Function:   Assess patient stability and activity tolerance for standing, transferring and ambulating with or without assistive devices   Assist with transfers and ambulation using safe patient handling equipment as needed  Goal: Maintain proper alignment of affected body part  3/2/2024 2219 by Danitza Rios RN  Outcome: Progressing  Goal: Return ADL status to a safe level of function  3/2/2024 2219 by Danitza Rios RN  Outcome: Progressing     Problem: Infection - Adult  Goal: Absence of infection at discharge  3/3/2024 1044 by Gume Cristobal RN  Outcome: Progressing  3/2/2024 2219 by Danitza Rios RN  Outcome: Progressing  Flowsheets (Taken 3/2/2024 2219)  Absence of infection at discharge:   Assess and monitor for signs and symptoms of infection   Monitor lab/diagnostic results   Monitor all insertion sites i.e., indwelling lines, tubes and drains   Administer

## 2024-03-04 VITALS
DIASTOLIC BLOOD PRESSURE: 54 MMHG | BODY MASS INDEX: 31.89 KG/M2 | HEART RATE: 60 BPM | OXYGEN SATURATION: 95 % | HEIGHT: 63 IN | RESPIRATION RATE: 18 BRPM | SYSTOLIC BLOOD PRESSURE: 132 MMHG | TEMPERATURE: 98 F | WEIGHT: 180 LBS

## 2024-03-04 LAB
ALBUMIN SERPL-MCNC: 2.8 G/DL (ref 3.5–5)
ALBUMIN/GLOB SERPL: 0.9 (ref 1.1–2.2)
ALP SERPL-CCNC: 74 U/L (ref 45–117)
ALT SERPL-CCNC: 60 U/L (ref 12–78)
ANION GAP SERPL CALC-SCNC: 3 MMOL/L (ref 5–15)
AST SERPL-CCNC: 34 U/L (ref 15–37)
BASOPHILS # BLD: 0 K/UL (ref 0–0.1)
BASOPHILS NFR BLD: 1 % (ref 0–1)
BILIRUB SERPL-MCNC: 0.5 MG/DL (ref 0.2–1)
BUN SERPL-MCNC: 23 MG/DL (ref 6–20)
BUN/CREAT SERPL: 47 (ref 12–20)
CALCIUM SERPL-MCNC: 8.9 MG/DL (ref 8.5–10.1)
CHLORIDE SERPL-SCNC: 106 MMOL/L (ref 97–108)
CO2 SERPL-SCNC: 29 MMOL/L (ref 21–32)
CREAT SERPL-MCNC: 0.49 MG/DL (ref 0.55–1.02)
DIFFERENTIAL METHOD BLD: ABNORMAL
EOSINOPHIL # BLD: 0.1 K/UL (ref 0–0.4)
EOSINOPHIL NFR BLD: 1 % (ref 0–7)
ERYTHROCYTE [DISTWIDTH] IN BLOOD BY AUTOMATED COUNT: 12.8 % (ref 11.5–14.5)
GLOBULIN SER CALC-MCNC: 3.1 G/DL (ref 2–4)
GLUCOSE BLD STRIP.AUTO-MCNC: 133 MG/DL (ref 65–117)
GLUCOSE BLD STRIP.AUTO-MCNC: 224 MG/DL (ref 65–117)
GLUCOSE SERPL-MCNC: 185 MG/DL (ref 65–100)
HCT VFR BLD AUTO: 41.1 % (ref 35–47)
HGB BLD-MCNC: 13.9 G/DL (ref 11.5–16)
IMM GRANULOCYTES # BLD AUTO: 0 K/UL (ref 0–0.04)
IMM GRANULOCYTES NFR BLD AUTO: 1 % (ref 0–0.5)
LYMPHOCYTES # BLD: 2.5 K/UL (ref 0.8–3.5)
LYMPHOCYTES NFR BLD: 43 % (ref 12–49)
MCH RBC QN AUTO: 35.5 PG (ref 26–34)
MCHC RBC AUTO-ENTMCNC: 33.8 G/DL (ref 30–36.5)
MCV RBC AUTO: 104.8 FL (ref 80–99)
MONOCYTES # BLD: 0.4 K/UL (ref 0–1)
MONOCYTES NFR BLD: 7 % (ref 5–13)
NEUTS SEG # BLD: 2.8 K/UL (ref 1.8–8)
NEUTS SEG NFR BLD: 47 % (ref 32–75)
NRBC # BLD: 0 K/UL (ref 0–0.01)
NRBC BLD-RTO: 0 PER 100 WBC
PLATELET # BLD AUTO: 189 K/UL (ref 150–400)
PMV BLD AUTO: 9.9 FL (ref 8.9–12.9)
POTASSIUM SERPL-SCNC: 3.6 MMOL/L (ref 3.5–5.1)
PROT SERPL-MCNC: 5.9 G/DL (ref 6.4–8.2)
RBC # BLD AUTO: 3.92 M/UL (ref 3.8–5.2)
SERVICE CMNT-IMP: ABNORMAL
SERVICE CMNT-IMP: ABNORMAL
SODIUM SERPL-SCNC: 138 MMOL/L (ref 136–145)
VIT B12 SERPL-MCNC: 452 PG/ML (ref 193–986)
WBC # BLD AUTO: 5.9 K/UL (ref 3.6–11)

## 2024-03-04 PROCEDURE — 82962 GLUCOSE BLOOD TEST: CPT

## 2024-03-04 PROCEDURE — 6370000000 HC RX 637 (ALT 250 FOR IP): Performed by: FAMILY MEDICINE

## 2024-03-04 PROCEDURE — 85025 COMPLETE CBC W/AUTO DIFF WBC: CPT

## 2024-03-04 PROCEDURE — 2580000003 HC RX 258: Performed by: INTERNAL MEDICINE

## 2024-03-04 PROCEDURE — 6370000000 HC RX 637 (ALT 250 FOR IP): Performed by: INTERNAL MEDICINE

## 2024-03-04 PROCEDURE — 82607 VITAMIN B-12: CPT

## 2024-03-04 PROCEDURE — 80053 COMPREHEN METABOLIC PANEL: CPT

## 2024-03-04 PROCEDURE — 99239 HOSP IP/OBS DSCHRG MGMT >30: CPT | Performed by: INTERNAL MEDICINE

## 2024-03-04 PROCEDURE — 97535 SELF CARE MNGMENT TRAINING: CPT

## 2024-03-04 PROCEDURE — 36415 COLL VENOUS BLD VENIPUNCTURE: CPT

## 2024-03-04 PROCEDURE — 6360000002 HC RX W HCPCS: Performed by: INTERNAL MEDICINE

## 2024-03-04 RX ORDER — CASTOR OIL AND BALSAM, PERU 788; 87 MG/G; MG/G
OINTMENT TOPICAL 2 TIMES DAILY
Qty: 1 EACH | Refills: 0 | Status: SHIPPED | OUTPATIENT
Start: 2024-03-04

## 2024-03-04 RX ORDER — POLYETHYLENE GLYCOL 3350 17 G/17G
17 POWDER, FOR SOLUTION ORAL DAILY PRN
Qty: 527 G | Refills: 1 | Status: SHIPPED | OUTPATIENT
Start: 2024-03-04 | End: 2024-05-05

## 2024-03-04 RX ORDER — INSULIN GLARGINE 100 [IU]/ML
30 INJECTION, SOLUTION SUBCUTANEOUS DAILY
Qty: 10 ML | Refills: 3 | Status: SHIPPED | OUTPATIENT
Start: 2024-03-05

## 2024-03-04 RX ORDER — ESCITALOPRAM OXALATE 10 MG/1
10 TABLET ORAL DAILY
Qty: 30 TABLET | Refills: 3 | Status: SHIPPED | OUTPATIENT
Start: 2024-03-05

## 2024-03-04 RX ADMIN — PANTOPRAZOLE SODIUM 40 MG: 40 TABLET, DELAYED RELEASE ORAL at 08:52

## 2024-03-04 RX ADMIN — PRIMIDONE 50 MG: 50 TABLET ORAL at 08:52

## 2024-03-04 RX ADMIN — LISINOPRIL 10 MG: 5 TABLET ORAL at 08:52

## 2024-03-04 RX ADMIN — ESCITALOPRAM OXALATE 10 MG: 10 TABLET ORAL at 08:52

## 2024-03-04 RX ADMIN — ENOXAPARIN SODIUM 40 MG: 100 INJECTION SUBCUTANEOUS at 08:52

## 2024-03-04 RX ADMIN — Medication: at 08:58

## 2024-03-04 RX ADMIN — INSULIN GLARGINE 30 UNITS: 100 INJECTION, SOLUTION SUBCUTANEOUS at 08:51

## 2024-03-04 RX ADMIN — DICLOFENAC SODIUM 2 G: 10 GEL TOPICAL at 08:58

## 2024-03-04 RX ADMIN — OXYCODONE HYDROCHLORIDE AND ACETAMINOPHEN 1 TABLET: 5; 325 TABLET ORAL at 08:52

## 2024-03-04 RX ADMIN — PREDNISONE 20 MG: 20 TABLET ORAL at 08:52

## 2024-03-04 RX ADMIN — CARVEDILOL 12.5 MG: 12.5 TABLET, FILM COATED ORAL at 08:52

## 2024-03-04 RX ADMIN — INSULIN LISPRO 2 UNITS: 100 INJECTION, SOLUTION INTRAVENOUS; SUBCUTANEOUS at 11:28

## 2024-03-04 RX ADMIN — CLONAZEPAM 1 MG: 1 TABLET ORAL at 08:52

## 2024-03-04 RX ADMIN — SODIUM CHLORIDE, PRESERVATIVE FREE 10 ML: 5 INJECTION INTRAVENOUS at 08:52

## 2024-03-04 RX ADMIN — DICLOFENAC SODIUM 2 G: 10 GEL TOPICAL at 11:29

## 2024-03-04 ASSESSMENT — PAIN DESCRIPTION - DESCRIPTORS: DESCRIPTORS: ACHING

## 2024-03-04 ASSESSMENT — PAIN DESCRIPTION - LOCATION: LOCATION: GENERALIZED

## 2024-03-04 ASSESSMENT — PAIN SCALES - GENERAL: PAINLEVEL_OUTOF10: 6

## 2024-03-04 NOTE — CARE COORDINATION
Pt is clear from CM standpoint for d/c.    [facility] to transport at 3 pm    Report number to the [facility] is [phone].    Going to room 132.      Transition of Care Plan:     RUR: 17%  Prior Level of Functioning: Assistance   Disposition: SNF  If SNF or IPR: Date FOC offered: 2/27/24  Date FOC received: 2/27/24  Accepting facility: [facility]  Date authorization started with reference number:  Date authorization received and expires:   Follow up appointments: PCP  DME needed: No DME needed   Transportation at discharge: [facility] to transport at 3 pm  IM/IMM Medicare/ letter given: 3/4/24  Is patient a [facility] and connected with [facility]? No              If yes, was  transfer form completed and VA notified? No  Caregiver Contact: Pt's son   Discharge Caregiver contacted prior to discharge? Pt, Pt's son and the [facility]  Care Conference needed? No  Barriers to discharge: None        03/04/24 1142   Services At/After Discharge   Transition of Care Consult (CM Consult) SNF   Services At/After Discharge Skilled Nursing Facility (SNF)   [facility] Resource Information Provided? No   Mode of Transport at Discharge BLS   Confirm Follow Up Transport Self   Condition of Participation: Discharge Planning   The Plan for Transition of Care is related to the following treatment goals: The goal is for SNF at the [facility] before returning back to the [facility]   The Patient and/or Patient Representative was provided with a Choice of Provider? Patient;Patient Representative   Name of the Patient Representative who was provided with the Choice of Provider and agrees with the Discharge Plan?  Pt's son Jimi Verdin   The Patient and/Or Patient Representative agree with the Discharge Plan? Yes   Freedom of Choice list was provided with basic dialogue that supports the patient's individualized plan of care/goals, treatment preferences, and shares the quality data associated with the

## 2024-03-04 NOTE — PLAN OF CARE
Problem: Occupational Therapy - Adult  Goal: By Discharge: Performs self-care activities at highest level of function for planned discharge setting.  See evaluation for individualized goals.  Description: FUNCTIONAL STATUS PRIOR TO ADMISSION:    Receives Help From:  ([facility] staff; son local), ADL Assistance: Needs assistance, Bath: Dependent/Total, Dressing: Supervision (Set up UE ADLs,mod A LE ADLs, Able to don R LE sock seated edge of bed; L LE dropped into HARSH  internal rotation/flexion when she attempted to cross L foot on R knee in supine on eval; she reports staff does LE dressing \"its faster\"), Grooming: Stand by assistance, Feeding: Modified independent , Toileting: Needs assistance (wears diapers since 2017; incontinent void; aware of BM and has control of this but home set up causes her to use diaper for staff convience),  , Ambulation Assistance: Non-ambulatory (was walking last ? May? July? Inconsistent patient report), Transfer Assistance: Needs assistance (2 person assist per facility ; they therefore cause her to stay in bed per safety policy \"1 person can help me if they know what they are doing but most dont so they just leave me in bed.\"), Active : No     HOME SUPPORT: Patient lived at [facility]. Staff assists with ADLs as noted    Occupational Therapy Goals:  Initiated 2/28/2024  1.  Patient will perform grooming S seated unsupported edge of bed B feet on floor w/o loss of balance with Supervision within 7 day(s).  2.  Patient will perform upper body dressing seated edge of bed overhead technique seated edge of bed w/<3 vc with Stand by Assist within 7 day(s).  3.  Patient will perform lower body dressing with Moderate Assist within 7 day(s).  4.  Patient will perform toilet transfers with Maximal Assist, Additional Time, and Assist x1  within 7 day(s).  5.  Patient will perform all aspects of toileting with Maximal Assist, Additional Time, and Assist x1 within 7 day(s).  6.   weakness L LE; LE \"falls\" against gravity without any eccectric control)  Interventions: Manual cues;Safety awareness training;Tactile cues;Verbal cues;Visual cues;Weight shifting training/pressure relief  Sit to Stand: Maximum assistance;Assist X2;Adaptive equipment;Additional time (best ; soft but stable in standing)  Stand to Sit: Maximum assistance;Adaptive equipment;Additional time (poor eccentric control; heavy UE use w/Best )  Bed to Chair: Total assistance;Additional time;Adaptive equipment (good participation with use of best ; able to follow all commands and use in effective safe manner for 1 person transfer)  Toilet Transfer: Total assistance;Adaptive equipment;Additional time (would tolerate toilet transfer with best )           Balance:  Standing: Impaired  Balance  Sitting: Impaired  Sitting - Static: Fair (occasional);Unsupported;Occasional  Sitting - Dynamic: Fair (occasional)  Standing: Impaired  Standing - Static: Poor;Fair;Constant support  Standing - Dynamic: Poor;Constant support (use of Best  for safe standing)      ADL Intervention:         Feeding: Setup         Grooming: Setup        UE Bathing: Moderate assistance  UE Bathing Skilled Clinical Factors: limited by impaired dynamic sitting balance       LE Bathing Skilled Clinical Factors: most limited by proximal LE weakness; using UEs to manage L LE against gravity    UE Dressing: Minimal assistance;Moderate assistance       LE Dressing: Maximum assistance;Increased time to complete;Dependent/Total       Toileting: Dependent/Total  Toileting Skilled Clinical Factors: incontinent B&B         Pain Ratin/10 while in best ; 4/10 at rest in supine  Pain Intervention(s):   rest, repositioning, and pain is at a level acceptable to the patient      Activity Tolerance:   Fair , requires frequent rest breaks, and SpO2 stable on room air  Please refer to the flowsheet for vital signs taken during this

## 2024-03-04 NOTE — PLAN OF CARE
Problem: Infection - Adult  Goal: Absence of infection at discharge  3/3/2024 2130 by Danitza Rios RN  Outcome: Progressing  Flowsheets (Taken 3/3/2024 2130)  Absence of infection at discharge:   Assess and monitor for signs and symptoms of infection   Monitor lab/diagnostic results   Monitor all insertion sites i.e., indwelling lines, tubes and drains   Administer medications as ordered  3/3/2024 1044 by Gume Cristobal RN  Outcome: Progressing     Problem: Skin/Tissue Integrity - Adult  Goal: Skin integrity remains intact  Outcome: Progressing  Flowsheets  Taken 3/3/2024 2130  Skin Integrity Remains Intact:   Monitor for areas of redness and/or skin breakdown   Assess vascular access sites hourly  Taken 3/3/2024 2045  Skin Integrity Remains Intact: Monitor for areas of redness and/or skin breakdown     Problem: Pain  Goal: Verbalizes/displays adequate comfort level or baseline comfort level  3/3/2024 2130 by Danitza Rios RN  Outcome: Progressing  Flowsheets (Taken 3/3/2024 2130)  Verbalizes/displays adequate comfort level or baseline comfort level:   Encourage patient to monitor pain and request assistance   Assess pain using appropriate pain scale   Administer analgesics based on type and severity of pain and evaluate response  3/3/2024 1044 by Gume Cristobal RN  Outcome: Progressing

## 2024-03-04 NOTE — PROGRESS NOTES
INTERNAL MEDICINE PROGRESS NOTE    NAME:  Siomara Collins   :   1945   MRN:   [id number]     Date/Time:  3/4/2024 6:13 AM  Subjective:   History:  Chart reviewed and patient seen and examined and D/W her nurse this AM and all events noted. She is followed by me for HTN, HLD, DM, ILD, Chronic hypoxemic respiratory failure, DJD, Obesity and other medical problems. She has now been admitted with Klebsiella ESBL UTI and on Meropenem since  with completion of 10 days 33 AM  .    She continues to remain weak without change and without any other neurologic complaints.  She was seen by OT on 3/1 and still noted to be very weak and they are still recommending inpatient rehab.  She denies any chest pain, shortness of breath or other cardiac/respiratory complaints.  She notes no current GI or  complaints except constipation and now taking Miralax (occasionally declining at night).  She notes no other complaints on complete review of systems except she does seem depressed and despondent as apparently she and her daughter had a disagreement on  and her daughter told her that she would no longer speak to her and she could not speak to her grandchildren.  Lexapro started . She is concerned about the fact that her living arrangement in her current environment has her in the memory care unit which I do not think is indicated as her memory seems to be normal age-related.      Medications reviewed:  Current Facility-Administered Medications   Medication Dose Route Frequency    insulin glargine (LANTUS) injection vial 30 Units  30 Units SubCUTAneous Daily    escitalopram (LEXAPRO) tablet 10 mg  10 mg Oral Daily    LORazepam (ATIVAN) tablet 1 mg  1 mg Oral TID PRN    polyethylene glycol (GLYCOLAX) packet 17 g  17 g Oral BID    diclofenac sodium (VOLTAREN) 1 % gel 2 g  2 g Topical 4x daily    oxyCODONE-acetaminophen (PERCOCET) 5-325 MG per tablet 1 tablet  1 tablet Oral Q12H    calcium carbonate (TUMS) chewable

## 2024-03-04 NOTE — PROGRESS NOTES
End of Shift Note    Bedside shift change report given to YUDELKA Williamson (oncoming nurse) by Danitza Rios RN (offgoing nurse).  Report included the following information Nurse Handoff Report, Intake/Output, MAR, and Recent Results      Shift worked:  1900 - 0700   Shift summary and any significant changes:    Pt continues to c/o bilateral lower extremity pain radiating to ankles and toes, Voltaren applied to area. Pt has been turning and repositioning self in bed. Pain controlled with scheduled Percocet BID. Pt did not take last night Miralax dose, LBM 3/2..      Concerns for physician to address:  None   Zone phone for oncoming shift:   [phone]     Danitza Rios RN

## 2024-03-05 ENCOUNTER — CARE COORDINATION (OUTPATIENT)
Dept: CARE COORDINATION | Age: 79
End: 2024-03-05

## 2024-03-05 NOTE — CARE COORDINATION
SECURE email notification sent to identified IDT members at [facility].

## 2024-03-05 NOTE — DISCHARGE SUMMARY
[facility]              [address] [place]  [address]                            DISCHARGE SUMMARY      PATIENT NAME: ABDI PORTILLO               : 1945  MED REC NO: [id number]                       ROOM: 1142  ACCOUNT NO: [id number]                       ADMIT DATE: 2024  PROVIDER: Irvin Vang MD    DISCHARGE DATE:  2024    ATTENDING PHYSICIAN:  Irvin Vang MD    FINAL DIAGNOSES:    1. Resistant urinary tract infection with ESBL Klebsiella urine infection.  2. Chronic hypoxic respiratory failure.  3. Interstitial lung disease.  4. Degenerative joint disease.  5. Hypertension.  6. Diabetes.  7. Polymyalgia rheumatica.  8. Complicated urinary tract infection.  9. Neuropathy.    CONSULTATIONS:   None.    PROCEDURES:  None.    For details of admission history and physical, please see admit note.  Briefly, the patient is a 79-year-old white female with the above-noted problems, who developed a resistant urinary tract infection only responsive to IV antibiotics, was admitted to the hospital for which she was started on treatment.  She also grew yeast out of her urine.  She received a 5-day course of Diflucan during the hospitalization and was treated with IV meropenem q.8 hours for 10 days.  Her symptoms resolved and at this point, it was decided that she would be a candidate to rehab as she apparently has been in a facility where she has not been getting rehab therefore become weaker.  She was seen by Physical Therapy and Occupational Therapy during the hospital and seemed to make progress, although was noted to be very weak and felt to be a candidate for inpatient rehab.  She was declined by [facility] as they did not feel she was functioning well enough for 3 hours and thus she has been accepted at [facility] rehab at [facility].  At this point, she will be discharged there.    DISCHARGE MEDICATIONS:  Consist of new

## 2024-04-05 ENCOUNTER — APPOINTMENT (OUTPATIENT)
Facility: HOSPITAL | Age: 79
DRG: 871 | End: 2024-04-05
Payer: MEDICARE

## 2024-04-05 ENCOUNTER — HOSPITAL ENCOUNTER (INPATIENT)
Facility: HOSPITAL | Age: 79
LOS: 10 days | Discharge: HOME HEALTH | DRG: 871 | End: 2024-04-15
Attending: EMERGENCY MEDICINE | Admitting: STUDENT IN AN ORGANIZED HEALTH CARE EDUCATION/TRAINING PROGRAM
Payer: MEDICARE

## 2024-04-05 DIAGNOSIS — K52.9 COLITIS: ICD-10-CM

## 2024-04-05 DIAGNOSIS — A41.9 SEPSIS, DUE TO UNSPECIFIED ORGANISM, UNSPECIFIED WHETHER ACUTE ORGAN DYSFUNCTION PRESENT (HCC): Primary | ICD-10-CM

## 2024-04-05 DIAGNOSIS — J18.9 COMMUNITY ACQUIRED PNEUMONIA OF LEFT LOWER LOBE OF LUNG: ICD-10-CM

## 2024-04-05 DIAGNOSIS — U07.1 COVID-19 VIRUS INFECTION: ICD-10-CM

## 2024-04-05 LAB
ALBUMIN SERPL-MCNC: 3.3 G/DL (ref 3.5–5)
ALBUMIN/GLOB SERPL: 0.9 (ref 1.1–2.2)
ALP SERPL-CCNC: 108 U/L (ref 45–117)
ALT SERPL-CCNC: 41 U/L (ref 12–78)
ANION GAP SERPL CALC-SCNC: 8 MMOL/L (ref 5–15)
APPEARANCE UR: CLEAR
AST SERPL-CCNC: 31 U/L (ref 15–37)
BACTERIA URNS QL MICRO: ABNORMAL /HPF
BASOPHILS # BLD: 0 K/UL (ref 0–0.1)
BASOPHILS NFR BLD: 1 % (ref 0–1)
BILIRUB SERPL-MCNC: 0.8 MG/DL (ref 0.2–1)
BILIRUB UR QL: NEGATIVE
BUN SERPL-MCNC: 15 MG/DL (ref 6–20)
BUN/CREAT SERPL: 22 (ref 12–20)
CALCIUM SERPL-MCNC: 9.4 MG/DL (ref 8.5–10.1)
CHLORIDE SERPL-SCNC: 102 MMOL/L (ref 97–108)
CO2 SERPL-SCNC: 29 MMOL/L (ref 21–32)
COLOR UR: ABNORMAL
CREAT SERPL-MCNC: 0.69 MG/DL (ref 0.55–1.02)
DIFFERENTIAL METHOD BLD: ABNORMAL
EOSINOPHIL # BLD: 0.1 K/UL (ref 0–0.4)
EOSINOPHIL NFR BLD: 1 % (ref 0–7)
EPITH CASTS URNS QL MICRO: ABNORMAL /LPF
ERYTHROCYTE [DISTWIDTH] IN BLOOD BY AUTOMATED COUNT: 12.2 % (ref 11.5–14.5)
FLUAV AG NPH QL IA: NEGATIVE
FLUBV AG NOSE QL IA: NEGATIVE
GLOBULIN SER CALC-MCNC: 3.6 G/DL (ref 2–4)
GLUCOSE BLD STRIP.AUTO-MCNC: 204 MG/DL (ref 65–117)
GLUCOSE BLD STRIP.AUTO-MCNC: 276 MG/DL (ref 65–117)
GLUCOSE BLD STRIP.AUTO-MCNC: 278 MG/DL (ref 65–117)
GLUCOSE SERPL-MCNC: 219 MG/DL (ref 65–100)
GLUCOSE UR STRIP.AUTO-MCNC: 500 MG/DL
HCT VFR BLD AUTO: 44.6 % (ref 35–47)
HGB BLD-MCNC: 15.3 G/DL (ref 11.5–16)
HGB UR QL STRIP: NEGATIVE
IMM GRANULOCYTES # BLD AUTO: 0.1 K/UL (ref 0–0.04)
IMM GRANULOCYTES NFR BLD AUTO: 1 % (ref 0–0.5)
KETONES UR QL STRIP.AUTO: NEGATIVE MG/DL
LACTATE BLD-SCNC: 0.77 MMOL/L (ref 0.4–2)
LACTATE BLD-SCNC: 2.52 MMOL/L (ref 0.4–2)
LEUKOCYTE ESTERASE UR QL STRIP.AUTO: NEGATIVE
LYMPHOCYTES # BLD: 1.5 K/UL (ref 0.8–3.5)
LYMPHOCYTES NFR BLD: 18 % (ref 12–49)
MAGNESIUM SERPL-MCNC: 1.5 MG/DL (ref 1.6–2.4)
MCH RBC QN AUTO: 34.3 PG (ref 26–34)
MCHC RBC AUTO-ENTMCNC: 34.3 G/DL (ref 30–36.5)
MCV RBC AUTO: 100 FL (ref 80–99)
MONOCYTES # BLD: 0.5 K/UL (ref 0–1)
MONOCYTES NFR BLD: 6 % (ref 5–13)
NEUTS SEG # BLD: 6.2 K/UL (ref 1.8–8)
NEUTS SEG NFR BLD: 73 % (ref 32–75)
NITRITE UR QL STRIP.AUTO: POSITIVE
NRBC # BLD: 0 K/UL (ref 0–0.01)
NRBC BLD-RTO: 0 PER 100 WBC
PH UR STRIP: 6 (ref 5–8)
PLATELET # BLD AUTO: 195 K/UL (ref 150–400)
PMV BLD AUTO: 9.8 FL (ref 8.9–12.9)
POTASSIUM SERPL-SCNC: 3.2 MMOL/L (ref 3.5–5.1)
PROCALCITONIN SERPL-MCNC: <0.05 NG/ML
PROT SERPL-MCNC: 6.9 G/DL (ref 6.4–8.2)
PROT UR STRIP-MCNC: NEGATIVE MG/DL
RBC # BLD AUTO: 4.46 M/UL (ref 3.8–5.2)
RBC #/AREA URNS HPF: ABNORMAL /HPF (ref 0–5)
SERVICE CMNT-IMP: ABNORMAL
SODIUM SERPL-SCNC: 139 MMOL/L (ref 136–145)
SP GR UR REFRACTOMETRY: 1.01 (ref 1–1.03)
TROPONIN I SERPL HS-MCNC: 20 NG/L (ref 0–51)
URINE CULTURE IF INDICATED: ABNORMAL
UROBILINOGEN UR QL STRIP.AUTO: 0.2 EU/DL (ref 0.2–1)
WBC # BLD AUTO: 8.4 K/UL (ref 3.6–11)
WBC URNS QL MICRO: ABNORMAL /HPF (ref 0–4)

## 2024-04-05 PROCEDURE — 85025 COMPLETE CBC W/AUTO DIFF WBC: CPT

## 2024-04-05 PROCEDURE — 6370000000 HC RX 637 (ALT 250 FOR IP): Performed by: STUDENT IN AN ORGANIZED HEALTH CARE EDUCATION/TRAINING PROGRAM

## 2024-04-05 PROCEDURE — 93005 ELECTROCARDIOGRAM TRACING: CPT | Performed by: EMERGENCY MEDICINE

## 2024-04-05 PROCEDURE — 83735 ASSAY OF MAGNESIUM: CPT

## 2024-04-05 PROCEDURE — 2500000003 HC RX 250 WO HCPCS: Performed by: STUDENT IN AN ORGANIZED HEALTH CARE EDUCATION/TRAINING PROGRAM

## 2024-04-05 PROCEDURE — 6360000002 HC RX W HCPCS: Performed by: EMERGENCY MEDICINE

## 2024-04-05 PROCEDURE — 6370000000 HC RX 637 (ALT 250 FOR IP): Performed by: EMERGENCY MEDICINE

## 2024-04-05 PROCEDURE — 2580000003 HC RX 258: Performed by: STUDENT IN AN ORGANIZED HEALTH CARE EDUCATION/TRAINING PROGRAM

## 2024-04-05 PROCEDURE — 96375 TX/PRO/DX INJ NEW DRUG ADDON: CPT

## 2024-04-05 PROCEDURE — 81001 URINALYSIS AUTO W/SCOPE: CPT

## 2024-04-05 PROCEDURE — 6360000002 HC RX W HCPCS: Performed by: STUDENT IN AN ORGANIZED HEALTH CARE EDUCATION/TRAINING PROGRAM

## 2024-04-05 PROCEDURE — 99285 EMERGENCY DEPT VISIT HI MDM: CPT

## 2024-04-05 PROCEDURE — 36415 COLL VENOUS BLD VENIPUNCTURE: CPT

## 2024-04-05 PROCEDURE — 87154 CUL TYP ID BLD PTHGN 6+ TRGT: CPT

## 2024-04-05 PROCEDURE — 80053 COMPREHEN METABOLIC PANEL: CPT

## 2024-04-05 PROCEDURE — 84145 PROCALCITONIN (PCT): CPT

## 2024-04-05 PROCEDURE — 0202U NFCT DS 22 TRGT SARS-COV-2: CPT

## 2024-04-05 PROCEDURE — 84484 ASSAY OF TROPONIN QUANT: CPT

## 2024-04-05 PROCEDURE — 1100000003 HC PRIVATE W/ TELEMETRY

## 2024-04-05 PROCEDURE — 87804 INFLUENZA ASSAY W/OPTIC: CPT

## 2024-04-05 PROCEDURE — 74177 CT ABD & PELVIS W/CONTRAST: CPT

## 2024-04-05 PROCEDURE — 2580000003 HC RX 258: Performed by: EMERGENCY MEDICINE

## 2024-04-05 PROCEDURE — 87040 BLOOD CULTURE FOR BACTERIA: CPT

## 2024-04-05 PROCEDURE — 82962 GLUCOSE BLOOD TEST: CPT

## 2024-04-05 PROCEDURE — 76705 ECHO EXAM OF ABDOMEN: CPT

## 2024-04-05 PROCEDURE — 96374 THER/PROPH/DIAG INJ IV PUSH: CPT

## 2024-04-05 PROCEDURE — 6360000004 HC RX CONTRAST MEDICATION: Performed by: RADIOLOGY

## 2024-04-05 PROCEDURE — 83605 ASSAY OF LACTIC ACID: CPT

## 2024-04-05 PROCEDURE — 71045 X-RAY EXAM CHEST 1 VIEW: CPT

## 2024-04-05 RX ORDER — ACETAMINOPHEN 500 MG
1000 TABLET ORAL
Status: COMPLETED | OUTPATIENT
Start: 2024-04-05 | End: 2024-04-05

## 2024-04-05 RX ORDER — GABAPENTIN 100 MG/1
100 CAPSULE ORAL 3 TIMES DAILY
COMMUNITY

## 2024-04-05 RX ORDER — METRONIDAZOLE 500 MG/100ML
500 INJECTION, SOLUTION INTRAVENOUS EVERY 8 HOURS
Status: DISCONTINUED | OUTPATIENT
Start: 2024-04-05 | End: 2024-04-14

## 2024-04-05 RX ORDER — ENOXAPARIN SODIUM 100 MG/ML
40 INJECTION SUBCUTANEOUS DAILY
Status: DISCONTINUED | OUTPATIENT
Start: 2024-04-06 | End: 2024-04-15 | Stop reason: HOSPADM

## 2024-04-05 RX ORDER — PRIMIDONE 50 MG/1
50 TABLET ORAL 2 TIMES DAILY
Status: DISCONTINUED | OUTPATIENT
Start: 2024-04-05 | End: 2024-04-15 | Stop reason: HOSPADM

## 2024-04-05 RX ORDER — POLYETHYLENE GLYCOL 3350 17 G/17G
17 POWDER, FOR SOLUTION ORAL DAILY PRN
COMMUNITY

## 2024-04-05 RX ORDER — ACETAMINOPHEN 325 MG/1
650 TABLET ORAL EVERY 6 HOURS PRN
Status: DISCONTINUED | OUTPATIENT
Start: 2024-04-05 | End: 2024-04-15 | Stop reason: HOSPADM

## 2024-04-05 RX ORDER — LEVOFLOXACIN 5 MG/ML
750 INJECTION, SOLUTION INTRAVENOUS
Status: COMPLETED | OUTPATIENT
Start: 2024-04-05 | End: 2024-04-05

## 2024-04-05 RX ORDER — MONTELUKAST SODIUM 10 MG/1
10 TABLET ORAL NIGHTLY
Status: DISCONTINUED | OUTPATIENT
Start: 2024-04-05 | End: 2024-04-15 | Stop reason: HOSPADM

## 2024-04-05 RX ORDER — SODIUM CHLORIDE 0.9 % (FLUSH) 0.9 %
5-40 SYRINGE (ML) INJECTION EVERY 12 HOURS SCHEDULED
Status: DISCONTINUED | OUTPATIENT
Start: 2024-04-05 | End: 2024-04-15 | Stop reason: HOSPADM

## 2024-04-05 RX ORDER — ONDANSETRON 2 MG/ML
4 INJECTION INTRAMUSCULAR; INTRAVENOUS EVERY 6 HOURS PRN
Status: DISCONTINUED | OUTPATIENT
Start: 2024-04-05 | End: 2024-04-15 | Stop reason: HOSPADM

## 2024-04-05 RX ORDER — LIDOCAINE 4 G/G
1 PATCH TOPICAL DAILY
COMMUNITY

## 2024-04-05 RX ORDER — PREDNISONE 20 MG/1
20 TABLET ORAL DAILY
Status: DISCONTINUED | OUTPATIENT
Start: 2024-04-05 | End: 2024-04-15 | Stop reason: HOSPADM

## 2024-04-05 RX ORDER — SODIUM CHLORIDE 9 MG/ML
INJECTION, SOLUTION INTRAVENOUS PRN
Status: DISCONTINUED | OUTPATIENT
Start: 2024-04-05 | End: 2024-04-15 | Stop reason: HOSPADM

## 2024-04-05 RX ORDER — SODIUM CHLORIDE 0.9 % (FLUSH) 0.9 %
5-40 SYRINGE (ML) INJECTION PRN
Status: DISCONTINUED | OUTPATIENT
Start: 2024-04-05 | End: 2024-04-15 | Stop reason: HOSPADM

## 2024-04-05 RX ORDER — ACETAMINOPHEN 325 MG/1
650 TABLET ORAL EVERY 4 HOURS PRN
COMMUNITY

## 2024-04-05 RX ORDER — 0.9 % SODIUM CHLORIDE 0.9 %
30 INTRAVENOUS SOLUTION INTRAVENOUS ONCE
Status: COMPLETED | OUTPATIENT
Start: 2024-04-05 | End: 2024-04-05

## 2024-04-05 RX ORDER — OMEPRAZOLE 20 MG/1
20 CAPSULE, DELAYED RELEASE ORAL DAILY
COMMUNITY

## 2024-04-05 RX ORDER — HYDROCHLOROTHIAZIDE 25 MG/1
25 TABLET ORAL DAILY
Status: DISCONTINUED | OUTPATIENT
Start: 2024-04-05 | End: 2024-04-15 | Stop reason: HOSPADM

## 2024-04-05 RX ORDER — POTASSIUM CHLORIDE 7.45 MG/ML
10 INJECTION INTRAVENOUS
Status: COMPLETED | OUTPATIENT
Start: 2024-04-05 | End: 2024-04-05

## 2024-04-05 RX ORDER — LISINOPRIL 5 MG/1
10 TABLET ORAL DAILY
Status: DISCONTINUED | OUTPATIENT
Start: 2024-04-06 | End: 2024-04-15 | Stop reason: HOSPADM

## 2024-04-05 RX ORDER — INSULIN LISPRO 100 [IU]/ML
INJECTION, SOLUTION INTRAVENOUS; SUBCUTANEOUS
COMMUNITY

## 2024-04-05 RX ORDER — MAGNESIUM SULFATE 1 G/100ML
1000 INJECTION INTRAVENOUS ONCE
Status: COMPLETED | OUTPATIENT
Start: 2024-04-05 | End: 2024-04-05

## 2024-04-05 RX ORDER — FENTANYL CITRATE 50 UG/ML
25 INJECTION, SOLUTION INTRAMUSCULAR; INTRAVENOUS
Status: COMPLETED | OUTPATIENT
Start: 2024-04-05 | End: 2024-04-05

## 2024-04-05 RX ORDER — ONDANSETRON 2 MG/ML
4 INJECTION INTRAMUSCULAR; INTRAVENOUS ONCE
Status: COMPLETED | OUTPATIENT
Start: 2024-04-05 | End: 2024-04-05

## 2024-04-05 RX ORDER — GABAPENTIN 100 MG/1
100 CAPSULE ORAL 3 TIMES DAILY
Status: DISCONTINUED | OUTPATIENT
Start: 2024-04-05 | End: 2024-04-15 | Stop reason: HOSPADM

## 2024-04-05 RX ORDER — LEVOFLOXACIN 5 MG/ML
750 INJECTION, SOLUTION INTRAVENOUS EVERY 24 HOURS
Status: DISCONTINUED | OUTPATIENT
Start: 2024-04-06 | End: 2024-04-05

## 2024-04-05 RX ORDER — ONDANSETRON 4 MG/1
4 TABLET, ORALLY DISINTEGRATING ORAL EVERY 8 HOURS PRN
Status: DISCONTINUED | OUTPATIENT
Start: 2024-04-05 | End: 2024-04-15 | Stop reason: HOSPADM

## 2024-04-05 RX ORDER — POLYETHYLENE GLYCOL 3350 17 G/17G
17 POWDER, FOR SOLUTION ORAL DAILY PRN
Status: DISCONTINUED | OUTPATIENT
Start: 2024-04-05 | End: 2024-04-05

## 2024-04-05 RX ORDER — ACETAMINOPHEN 650 MG/1
650 SUPPOSITORY RECTAL EVERY 6 HOURS PRN
Status: DISCONTINUED | OUTPATIENT
Start: 2024-04-05 | End: 2024-04-15 | Stop reason: HOSPADM

## 2024-04-05 RX ORDER — PANTOPRAZOLE SODIUM 40 MG/1
40 TABLET, DELAYED RELEASE ORAL
Status: DISCONTINUED | OUTPATIENT
Start: 2024-04-06 | End: 2024-04-15 | Stop reason: HOSPADM

## 2024-04-05 RX ORDER — ESCITALOPRAM OXALATE 10 MG/1
10 TABLET ORAL DAILY
Status: DISCONTINUED | OUTPATIENT
Start: 2024-04-06 | End: 2024-04-15 | Stop reason: HOSPADM

## 2024-04-05 RX ORDER — METRONIDAZOLE 500 MG/100ML
500 INJECTION, SOLUTION INTRAVENOUS
Status: COMPLETED | OUTPATIENT
Start: 2024-04-05 | End: 2024-04-05

## 2024-04-05 RX ORDER — CARVEDILOL 12.5 MG/1
12.5 TABLET ORAL 2 TIMES DAILY
Status: DISCONTINUED | OUTPATIENT
Start: 2024-04-05 | End: 2024-04-15 | Stop reason: HOSPADM

## 2024-04-05 RX ORDER — POLYETHYLENE GLYCOL 3350 17 G/17G
17 POWDER, FOR SOLUTION ORAL DAILY PRN
Status: DISCONTINUED | OUTPATIENT
Start: 2024-04-05 | End: 2024-04-15 | Stop reason: HOSPADM

## 2024-04-05 RX ORDER — POTASSIUM CHLORIDE 750 MG/1
20 TABLET, FILM COATED, EXTENDED RELEASE ORAL ONCE
Status: COMPLETED | OUTPATIENT
Start: 2024-04-05 | End: 2024-04-05

## 2024-04-05 RX ORDER — MAGNESIUM SULFATE 1 G/100ML
1000 INJECTION INTRAVENOUS
Status: COMPLETED | OUTPATIENT
Start: 2024-04-05 | End: 2024-04-05

## 2024-04-05 RX ORDER — FENTANYL CITRATE 50 UG/ML
50 INJECTION, SOLUTION INTRAMUSCULAR; INTRAVENOUS
Status: COMPLETED | OUTPATIENT
Start: 2024-04-05 | End: 2024-04-05

## 2024-04-05 RX ADMIN — ONDANSETRON 4 MG: 2 INJECTION INTRAMUSCULAR; INTRAVENOUS at 10:54

## 2024-04-05 RX ADMIN — METRONIDAZOLE 500 MG: 500 INJECTION, SOLUTION INTRAVENOUS at 21:27

## 2024-04-05 RX ADMIN — IOPAMIDOL 100 ML: 755 INJECTION, SOLUTION INTRAVENOUS at 10:16

## 2024-04-05 RX ADMIN — MAGNESIUM SULFATE HEPTAHYDRATE 1000 MG: 1 INJECTION, SOLUTION INTRAVENOUS at 17:59

## 2024-04-05 RX ADMIN — HYDROCORTISONE SODIUM SUCCINATE 100 MG: 100 INJECTION, POWDER, FOR SOLUTION INTRAMUSCULAR; INTRAVENOUS at 17:55

## 2024-04-05 RX ADMIN — DICLOFENAC SODIUM 2 G: 10 GEL TOPICAL at 21:18

## 2024-04-05 RX ADMIN — ACETAMINOPHEN 650 MG: 325 TABLET ORAL at 21:28

## 2024-04-05 RX ADMIN — ACETAMINOPHEN 1000 MG: 500 TABLET ORAL at 12:23

## 2024-04-05 RX ADMIN — GABAPENTIN 100 MG: 100 CAPSULE ORAL at 21:08

## 2024-04-05 RX ADMIN — PRIMIDONE 50 MG: 50 TABLET ORAL at 21:09

## 2024-04-05 RX ADMIN — FENTANYL CITRATE 25 MCG: 50 INJECTION INTRAMUSCULAR; INTRAVENOUS at 10:55

## 2024-04-05 RX ADMIN — SODIUM CHLORIDE, PRESERVATIVE FREE 10 ML: 5 INJECTION INTRAVENOUS at 21:22

## 2024-04-05 RX ADMIN — POTASSIUM CHLORIDE 20 MEQ: 750 TABLET, FILM COATED, EXTENDED RELEASE ORAL at 12:23

## 2024-04-05 RX ADMIN — POTASSIUM CHLORIDE 10 MEQ: 7.46 INJECTION, SOLUTION INTRAVENOUS at 11:17

## 2024-04-05 RX ADMIN — MONTELUKAST 10 MG: 10 TABLET, FILM COATED ORAL at 21:09

## 2024-04-05 RX ADMIN — LEVOFLOXACIN 750 MG: 5 INJECTION, SOLUTION INTRAVENOUS at 13:47

## 2024-04-05 RX ADMIN — SODIUM CHLORIDE: 9 INJECTION, SOLUTION INTRAVENOUS at 21:25

## 2024-04-05 RX ADMIN — FENTANYL CITRATE 50 MCG: 50 INJECTION INTRAMUSCULAR; INTRAVENOUS at 12:43

## 2024-04-05 RX ADMIN — SODIUM CHLORIDE 1932 ML: 9 INJECTION, SOLUTION INTRAVENOUS at 10:54

## 2024-04-05 RX ADMIN — MAGNESIUM SULFATE HEPTAHYDRATE 1000 MG: 1 INJECTION, SOLUTION INTRAVENOUS at 12:22

## 2024-04-05 RX ADMIN — METRONIDAZOLE 500 MG: 500 INJECTION, SOLUTION INTRAVENOUS at 12:19

## 2024-04-05 RX ADMIN — DOXYCYCLINE 100 MG: 100 INJECTION, POWDER, LYOPHILIZED, FOR SOLUTION INTRAVENOUS at 18:03

## 2024-04-05 ASSESSMENT — PAIN SCALES - GENERAL
PAINLEVEL_OUTOF10: 9
PAINLEVEL_OUTOF10: 0
PAINLEVEL_OUTOF10: 7
PAINLEVEL_OUTOF10: 8
PAINLEVEL_OUTOF10: 10
PAINLEVEL_OUTOF10: 8
PAINLEVEL_OUTOF10: 7
PAINLEVEL_OUTOF10: 8

## 2024-04-05 ASSESSMENT — PAIN - FUNCTIONAL ASSESSMENT
PAIN_FUNCTIONAL_ASSESSMENT: 0-10
PAIN_FUNCTIONAL_ASSESSMENT: 0-10

## 2024-04-05 ASSESSMENT — LIFESTYLE VARIABLES
HOW MANY STANDARD DRINKS CONTAINING ALCOHOL DO YOU HAVE ON A TYPICAL DAY: PATIENT DOES NOT DRINK
HOW OFTEN DO YOU HAVE A DRINK CONTAINING ALCOHOL: NEVER

## 2024-04-05 ASSESSMENT — PAIN DESCRIPTION - LOCATION
LOCATION: ABDOMEN
LOCATION: ABDOMEN
LOCATION: BACK
LOCATION: ABDOMEN

## 2024-04-05 NOTE — ED NOTES
Pt placed in stretcher with side rails up x2. Pt in gown, on monitor x3, and has call bell within reach.     Pt stating that she tested positive for COVID by her facility on April 1st. Denies chest pain or shortness of breath at this time.

## 2024-04-05 NOTE — ED PROVIDER NOTES
hospitals EMERGENCY DEPT  EMERGENCY DEPARTMENT ENCOUNTER       Pt Name: Siomara Collins  MRN: [id number]  Birthdate 1945  Date of evaluation: 4/5/2024  Provider: Carmen Martins MD   PCP: Irvin Vang MD  Note Started: 9:24 AM EDT 4/5/24     CHIEF COMPLAINT       Chief Complaint   Patient presents with    Abdominal Pain     Pt BIBEMS after experiencing weakness, dizziness, diaphoresis followed by abdominal pain, nausea, and diarrhea since 0630 today.         HISTORY OF PRESENT ILLNESS: 1 or more elements      History From: Patient, History limited by: none     Siomara Collins is a 79 y.o. female patient with history of chronic kidney disease, diabetes, IBS, here for dizziness, diaphoresis, abdominal pain and diarrhea.  Patient was diagnosed with COVID-19 4 days ago.  At 6:30 AM, she started to feel generalized weakness, lightheadedness and diaphoresis.  That was followed by nausea, upper abdominal pain and diarrhea.  She says she has had 2 episodes of diarrhea.  She was on antibiotics in February for her urinary tract infection.  Has a cough productive of white sputum.  She denies chest pain or shortness of breath.  She denies dysuria.  She has chronic back pain and has had multiple surgeries on her back.       Please See MDM for Additional Details of the HPI/PMH  Nursing Notes were all reviewed and agreed with or any disagreements were addressed in the HPI.     REVIEW OF SYSTEMS        Positives and Pertinent negatives as per HPI.    PAST HISTORY     Past Medical History:  Past Medical History:   Diagnosis Date    Abnormal LFTs 08/24/2017    FATTY LIVER    Arthritis     OSTEO    Avascular necrosis of hip (HCC) 08/24/2017    BILAT HIPS    Breast CA (HCC) 1989, 2001    BILATERAL; SURGERY, CHEMO    Chronic pain     CKD (chronic kidney disease), stage II 8/24/2017    Coagulation disorder ([facility]) 1984    ITP  (DR DC CAR) - PLATELETS DROPPED TO 5K    Depression     Diabetes (HCC) 2012    TYPE 2; NIDDM    DJD

## 2024-04-05 NOTE — ED NOTES
Pt back in room from CT at this time. Pt placed back on monitor x3. Per MD, wait until fluid bolus is complete prior to rechecking lactic acid.

## 2024-04-05 NOTE — ED NOTES
Pt. Sats down to 88% on room air post medications.  PT. Placed on 2L at this time.  Sats up to 97%.

## 2024-04-06 LAB
ANION GAP SERPL CALC-SCNC: 2 MMOL/L (ref 5–15)
B PERT DNA SPEC QL NAA+PROBE: NOT DETECTED
BASOPHILS # BLD: 0 K/UL (ref 0–0.1)
BASOPHILS NFR BLD: 0 % (ref 0–1)
BORDETELLA PARAPERTUSSIS BY PCR: NOT DETECTED
BUN SERPL-MCNC: 12 MG/DL (ref 6–20)
BUN/CREAT SERPL: 19 (ref 12–20)
C PNEUM DNA SPEC QL NAA+PROBE: NOT DETECTED
CALCIUM SERPL-MCNC: 8.6 MG/DL (ref 8.5–10.1)
CHLORIDE SERPL-SCNC: 108 MMOL/L (ref 97–108)
CO2 SERPL-SCNC: 28 MMOL/L (ref 21–32)
CREAT SERPL-MCNC: 0.63 MG/DL (ref 0.55–1.02)
DIFFERENTIAL METHOD BLD: ABNORMAL
EOSINOPHIL # BLD: 0 K/UL (ref 0–0.4)
EOSINOPHIL NFR BLD: 0 % (ref 0–7)
ERYTHROCYTE [DISTWIDTH] IN BLOOD BY AUTOMATED COUNT: 12 % (ref 11.5–14.5)
FLUAV SUBTYP SPEC NAA+PROBE: NOT DETECTED
FLUBV RNA SPEC QL NAA+PROBE: NOT DETECTED
GLUCOSE BLD STRIP.AUTO-MCNC: 245 MG/DL (ref 65–117)
GLUCOSE BLD STRIP.AUTO-MCNC: 287 MG/DL (ref 65–117)
GLUCOSE BLD STRIP.AUTO-MCNC: 346 MG/DL (ref 65–117)
GLUCOSE BLD STRIP.AUTO-MCNC: 389 MG/DL (ref 65–117)
GLUCOSE SERPL-MCNC: 314 MG/DL (ref 65–100)
HADV DNA SPEC QL NAA+PROBE: NOT DETECTED
HCOV 229E RNA SPEC QL NAA+PROBE: NOT DETECTED
HCOV HKU1 RNA SPEC QL NAA+PROBE: NOT DETECTED
HCOV NL63 RNA SPEC QL NAA+PROBE: NOT DETECTED
HCOV OC43 RNA SPEC QL NAA+PROBE: NOT DETECTED
HCT VFR BLD AUTO: 38.1 % (ref 35–47)
HGB BLD-MCNC: 13.3 G/DL (ref 11.5–16)
HMPV RNA SPEC QL NAA+PROBE: NOT DETECTED
HPIV1 RNA SPEC QL NAA+PROBE: NOT DETECTED
HPIV2 RNA SPEC QL NAA+PROBE: NOT DETECTED
HPIV3 RNA SPEC QL NAA+PROBE: DETECTED
HPIV4 RNA SPEC QL NAA+PROBE: NOT DETECTED
IMM GRANULOCYTES # BLD AUTO: 0.1 K/UL (ref 0–0.04)
IMM GRANULOCYTES NFR BLD AUTO: 1 % (ref 0–0.5)
LYMPHOCYTES # BLD: 0.5 K/UL (ref 0.8–3.5)
LYMPHOCYTES NFR BLD: 10 % (ref 12–49)
M PNEUMO DNA SPEC QL NAA+PROBE: NOT DETECTED
MCH RBC QN AUTO: 35.5 PG (ref 26–34)
MCHC RBC AUTO-ENTMCNC: 34.9 G/DL (ref 30–36.5)
MCV RBC AUTO: 101.6 FL (ref 80–99)
MONOCYTES # BLD: 0.3 K/UL (ref 0–1)
MONOCYTES NFR BLD: 5 % (ref 5–13)
NEUTS SEG # BLD: 4.1 K/UL (ref 1.8–8)
NEUTS SEG NFR BLD: 84 % (ref 32–75)
NRBC # BLD: 0 K/UL (ref 0–0.01)
NRBC BLD-RTO: 0 PER 100 WBC
PLATELET # BLD AUTO: 161 K/UL (ref 150–400)
PMV BLD AUTO: 10 FL (ref 8.9–12.9)
POTASSIUM SERPL-SCNC: 4.4 MMOL/L (ref 3.5–5.1)
RBC # BLD AUTO: 3.75 M/UL (ref 3.8–5.2)
RBC MORPH BLD: ABNORMAL
RSV RNA SPEC QL NAA+PROBE: NOT DETECTED
RV+EV RNA SPEC QL NAA+PROBE: NOT DETECTED
SARS-COV-2 RNA RESP QL NAA+PROBE: DETECTED
SERVICE CMNT-IMP: ABNORMAL
SODIUM SERPL-SCNC: 138 MMOL/L (ref 136–145)
WBC # BLD AUTO: 5 K/UL (ref 3.6–11)

## 2024-04-06 PROCEDURE — 6370000000 HC RX 637 (ALT 250 FOR IP): Performed by: NURSE PRACTITIONER

## 2024-04-06 PROCEDURE — 1100000003 HC PRIVATE W/ TELEMETRY

## 2024-04-06 PROCEDURE — 80048 BASIC METABOLIC PNL TOTAL CA: CPT

## 2024-04-06 PROCEDURE — 6370000000 HC RX 637 (ALT 250 FOR IP): Performed by: STUDENT IN AN ORGANIZED HEALTH CARE EDUCATION/TRAINING PROGRAM

## 2024-04-06 PROCEDURE — 36415 COLL VENOUS BLD VENIPUNCTURE: CPT

## 2024-04-06 PROCEDURE — 85025 COMPLETE CBC W/AUTO DIFF WBC: CPT

## 2024-04-06 PROCEDURE — 6360000002 HC RX W HCPCS: Performed by: STUDENT IN AN ORGANIZED HEALTH CARE EDUCATION/TRAINING PROGRAM

## 2024-04-06 PROCEDURE — 2580000003 HC RX 258: Performed by: STUDENT IN AN ORGANIZED HEALTH CARE EDUCATION/TRAINING PROGRAM

## 2024-04-06 PROCEDURE — 2500000003 HC RX 250 WO HCPCS: Performed by: STUDENT IN AN ORGANIZED HEALTH CARE EDUCATION/TRAINING PROGRAM

## 2024-04-06 PROCEDURE — 82962 GLUCOSE BLOOD TEST: CPT

## 2024-04-06 RX ORDER — LIDOCAINE 4 G/G
1 PATCH TOPICAL DAILY
Status: DISCONTINUED | OUTPATIENT
Start: 2024-04-06 | End: 2024-04-15 | Stop reason: HOSPADM

## 2024-04-06 RX ORDER — INSULIN GLARGINE 100 [IU]/ML
30 INJECTION, SOLUTION SUBCUTANEOUS DAILY
Status: DISCONTINUED | OUTPATIENT
Start: 2024-04-06 | End: 2024-04-15 | Stop reason: HOSPADM

## 2024-04-06 RX ORDER — DEXTROSE MONOHYDRATE 100 MG/ML
INJECTION, SOLUTION INTRAVENOUS CONTINUOUS PRN
Status: DISCONTINUED | OUTPATIENT
Start: 2024-04-06 | End: 2024-04-15 | Stop reason: HOSPADM

## 2024-04-06 RX ORDER — INSULIN LISPRO 100 [IU]/ML
0-8 INJECTION, SOLUTION INTRAVENOUS; SUBCUTANEOUS
Status: DISCONTINUED | OUTPATIENT
Start: 2024-04-06 | End: 2024-04-15 | Stop reason: HOSPADM

## 2024-04-06 RX ORDER — INSULIN LISPRO 100 [IU]/ML
0-4 INJECTION, SOLUTION INTRAVENOUS; SUBCUTANEOUS NIGHTLY
Status: DISCONTINUED | OUTPATIENT
Start: 2024-04-06 | End: 2024-04-15 | Stop reason: HOSPADM

## 2024-04-06 RX ORDER — GLUCAGON 1 MG/ML
1 KIT INJECTION PRN
Status: DISCONTINUED | OUTPATIENT
Start: 2024-04-06 | End: 2024-04-15 | Stop reason: HOSPADM

## 2024-04-06 RX ADMIN — SODIUM CHLORIDE: 9 INJECTION, SOLUTION INTRAVENOUS at 07:02

## 2024-04-06 RX ADMIN — SODIUM CHLORIDE, PRESERVATIVE FREE 10 ML: 5 INJECTION INTRAVENOUS at 20:20

## 2024-04-06 RX ADMIN — HYDROCORTISONE SODIUM SUCCINATE 100 MG: 100 INJECTION, POWDER, FOR SOLUTION INTRAMUSCULAR; INTRAVENOUS at 17:20

## 2024-04-06 RX ADMIN — HYDROCHLOROTHIAZIDE 25 MG: 25 TABLET ORAL at 10:01

## 2024-04-06 RX ADMIN — SODIUM CHLORIDE: 9 INJECTION, SOLUTION INTRAVENOUS at 06:52

## 2024-04-06 RX ADMIN — ESCITALOPRAM OXALATE 10 MG: 10 TABLET ORAL at 09:55

## 2024-04-06 RX ADMIN — DICLOFENAC SODIUM 2 G: 10 GEL TOPICAL at 10:01

## 2024-04-06 RX ADMIN — CARVEDILOL 12.5 MG: 12.5 TABLET, FILM COATED ORAL at 20:11

## 2024-04-06 RX ADMIN — DICLOFENAC SODIUM 2 G: 10 GEL TOPICAL at 14:01

## 2024-04-06 RX ADMIN — HYDROCORTISONE SODIUM SUCCINATE 100 MG: 100 INJECTION, POWDER, FOR SOLUTION INTRAMUSCULAR; INTRAVENOUS at 00:51

## 2024-04-06 RX ADMIN — ENOXAPARIN SODIUM 40 MG: 100 INJECTION SUBCUTANEOUS at 09:54

## 2024-04-06 RX ADMIN — Medication 8 UNITS: at 13:55

## 2024-04-06 RX ADMIN — GABAPENTIN 100 MG: 100 CAPSULE ORAL at 13:55

## 2024-04-06 RX ADMIN — MONTELUKAST 10 MG: 10 TABLET, FILM COATED ORAL at 20:11

## 2024-04-06 RX ADMIN — INSULIN GLARGINE 30 UNITS: 100 INJECTION, SOLUTION SUBCUTANEOUS at 13:55

## 2024-04-06 RX ADMIN — PRIMIDONE 50 MG: 50 TABLET ORAL at 20:11

## 2024-04-06 RX ADMIN — METRONIDAZOLE 500 MG: 500 INJECTION, SOLUTION INTRAVENOUS at 06:54

## 2024-04-06 RX ADMIN — HYDROCORTISONE SODIUM SUCCINATE 100 MG: 100 INJECTION, POWDER, FOR SOLUTION INTRAMUSCULAR; INTRAVENOUS at 23:17

## 2024-04-06 RX ADMIN — PRIMIDONE 50 MG: 50 TABLET ORAL at 09:55

## 2024-04-06 RX ADMIN — ACETAMINOPHEN 650 MG: 325 TABLET ORAL at 06:57

## 2024-04-06 RX ADMIN — LISINOPRIL 10 MG: 5 TABLET ORAL at 10:00

## 2024-04-06 RX ADMIN — DOXYCYCLINE 100 MG: 100 INJECTION, POWDER, LYOPHILIZED, FOR SOLUTION INTRAVENOUS at 17:24

## 2024-04-06 RX ADMIN — DICLOFENAC SODIUM 2 G: 10 GEL TOPICAL at 17:23

## 2024-04-06 RX ADMIN — GABAPENTIN 100 MG: 100 CAPSULE ORAL at 20:11

## 2024-04-06 RX ADMIN — METRONIDAZOLE 500 MG: 500 INJECTION, SOLUTION INTRAVENOUS at 13:58

## 2024-04-06 RX ADMIN — HYDROCORTISONE SODIUM SUCCINATE 100 MG: 100 INJECTION, POWDER, FOR SOLUTION INTRAMUSCULAR; INTRAVENOUS at 09:54

## 2024-04-06 RX ADMIN — DOXYCYCLINE 100 MG: 100 INJECTION, POWDER, LYOPHILIZED, FOR SOLUTION INTRAVENOUS at 07:05

## 2024-04-06 RX ADMIN — Medication 6 UNITS: at 17:23

## 2024-04-06 RX ADMIN — DICLOFENAC SODIUM 2 G: 10 GEL TOPICAL at 20:21

## 2024-04-06 RX ADMIN — PANTOPRAZOLE SODIUM 40 MG: 40 TABLET, DELAYED RELEASE ORAL at 06:58

## 2024-04-06 RX ADMIN — METRONIDAZOLE 500 MG: 500 INJECTION, SOLUTION INTRAVENOUS at 20:09

## 2024-04-06 ASSESSMENT — PAIN SCALES - GENERAL
PAINLEVEL_OUTOF10: 5
PAINLEVEL_OUTOF10: 5
PAINLEVEL_OUTOF10: 0

## 2024-04-06 ASSESSMENT — PAIN DESCRIPTION - LOCATION
LOCATION: LEG;BACK
LOCATION: BACK;LEG

## 2024-04-06 NOTE — H&P
Hospitalist Admission Note    NAME:   Siomara Collins   : 1945   MRN: [id number]     Date/Time: 2024 2:22 PM    Patient PCP: Irvin Vang MD    ______________________________________________________________________  Given the patient's current clinical presentation, I have a high level of concern for decompensation if discharged from the emergency department.  Complex decision making was performed, which includes reviewing the patient's available past medical records, laboratory results, and x-ray films.       My assessment of this patient's clinical condition and my plan of care is as follows.    Assessment / Plan:    Severe sepsis due to  Bilateral pneumonia  Sigmoid colitis:  UTI    Lactic acid 2.5, after fluids improved to 0.7  Fever 100 °F noted on admission  Empiric IV antibiotics  Follow-up cultures  CT abdomen:  Sigmoid descending colitis  Bilateral lower lobe alveolitis versus pneumonia  Posterior fusion of T10-S1 with loosening of T10 and S1 screws.    She was tested COVID-positive 5 days back, rapid COVID-negative here, will do a respiratory panel  If she becomes hypotensive, will need stress dose steroid, on chronic prednisone  Check enteric panel, c diff    Hypokalemia  Hypomagnesemia  Repleted  Repeat in a.m.    Hyperlipidemia  Hypertension  IBS  Resume home medications    PMR  Continue prednisone  Increase to stress dose of becomes hypotensive    Medical Decision Making:   I personally reviewed labs: CBC, BMP  I personally reviewed imaging: CT abdomen  I personally reviewed EKG:  Toxic drug monitoring: IV antibiotics  Discussed case with: ED provider. After discussion I am in agreement that acuity of patient's medical condition necessitates hospital stay.      Code Status: DNR as per her wishes  DVT Prophylaxis: Lovenox  Baseline: From nursing home    Subjective:   CHIEF COMPLAINT: Generalized weakness, cough, abdominal pain, diarrhea    HISTORY OF PRESENT ILLNESS:     Siomara 
No mass or ductal dilatation. ADRENALS: Unremarkable. KIDNEYS: No mass, calculus, or hydronephrosis. Left renal cyst with TRE of 11 HU, compatible with a simple cyst. STOMACH: Unremarkable. SMALL BOWEL: No dilatation or wall thickening. COLON: Diffuse thickening of the sigmoid and colon APPENDIX: Normal PERITONEUM: No ascites or pneumoperitoneum. RETROPERITONEUM: No lymphadenopathy or aortic aneurysm. REPRODUCTIVE ORGANS: Prior hysterectomy URINARY BLADDER: No mass or calculus. BONES: Posterior fusion of the T10 11 through the sacrum with loosening of S1 screws and T10 screws. Posterior decompression. Sclerotic changes throughout the lumbar spine with ABDOMINAL WALL: No mass or hernia. ADDITIONAL COMMENTS: N/A     Sigmoid descending colitis Bilateral lower lobe alveolitis versus pneumonia Posterior fusion of T10-S1 with loosening of T10 and S1 screws.    US ABDOMEN LIMITED Specify organ? GALLBLADDER    Result Date: 4/5/2024  CLINICAL INDICATION: Abdominal pain. Right upper quadrant ultrasound is performed. The gallbladder appears normal. The common bile duct is 7 mm. The liver appears unremarkable, portal vein flow is hepatopetal. Pancreatic head appears normal. The right kidney appears normal.     Negative.     _______________________________________________________________________    TOTAL TIME:  35 Minutes    Critical Care Provided     Minutes non procedure based    Signed: ROLANDO Sol NP    Procedures: see electronic medical records for all procedures/Xrays and details which were not copied into this note but were reviewed prior to creation of Plan.

## 2024-04-07 LAB
EKG ATRIAL RATE: 69 BPM
EKG DIAGNOSIS: NORMAL
EKG P AXIS: 64 DEGREES
EKG P-R INTERVAL: 242 MS
EKG Q-T INTERVAL: 454 MS
EKG QRS DURATION: 132 MS
EKG QTC CALCULATION (BAZETT): 486 MS
EKG R AXIS: 85 DEGREES
EKG T AXIS: 35 DEGREES
EKG VENTRICULAR RATE: 69 BPM
GLUCOSE BLD STRIP.AUTO-MCNC: 243 MG/DL (ref 65–117)
GLUCOSE BLD STRIP.AUTO-MCNC: 253 MG/DL (ref 65–117)
GLUCOSE BLD STRIP.AUTO-MCNC: 263 MG/DL (ref 65–117)
GLUCOSE BLD STRIP.AUTO-MCNC: 348 MG/DL (ref 65–117)
SERVICE CMNT-IMP: ABNORMAL

## 2024-04-07 PROCEDURE — 6360000002 HC RX W HCPCS: Performed by: STUDENT IN AN ORGANIZED HEALTH CARE EDUCATION/TRAINING PROGRAM

## 2024-04-07 PROCEDURE — 82962 GLUCOSE BLOOD TEST: CPT

## 2024-04-07 PROCEDURE — 6370000000 HC RX 637 (ALT 250 FOR IP): Performed by: STUDENT IN AN ORGANIZED HEALTH CARE EDUCATION/TRAINING PROGRAM

## 2024-04-07 PROCEDURE — 6370000000 HC RX 637 (ALT 250 FOR IP): Performed by: NURSE PRACTITIONER

## 2024-04-07 PROCEDURE — 1100000003 HC PRIVATE W/ TELEMETRY

## 2024-04-07 PROCEDURE — 2500000003 HC RX 250 WO HCPCS: Performed by: STUDENT IN AN ORGANIZED HEALTH CARE EDUCATION/TRAINING PROGRAM

## 2024-04-07 PROCEDURE — 2580000003 HC RX 258: Performed by: STUDENT IN AN ORGANIZED HEALTH CARE EDUCATION/TRAINING PROGRAM

## 2024-04-07 RX ORDER — INSULIN LISPRO 100 [IU]/ML
5 INJECTION, SOLUTION INTRAVENOUS; SUBCUTANEOUS EVERY 8 HOURS
Status: DISCONTINUED | OUTPATIENT
Start: 2024-04-07 | End: 2024-04-07

## 2024-04-07 RX ORDER — INSULIN LISPRO 100 [IU]/ML
5 INJECTION, SOLUTION INTRAVENOUS; SUBCUTANEOUS EVERY 8 HOURS
Status: DISCONTINUED | OUTPATIENT
Start: 2024-04-07 | End: 2024-04-15 | Stop reason: HOSPADM

## 2024-04-07 RX ADMIN — DOXYCYCLINE 100 MG: 100 INJECTION, POWDER, LYOPHILIZED, FOR SOLUTION INTRAVENOUS at 21:07

## 2024-04-07 RX ADMIN — DICLOFENAC SODIUM 2 G: 10 GEL TOPICAL at 13:41

## 2024-04-07 RX ADMIN — Medication 4 UNITS: at 16:22

## 2024-04-07 RX ADMIN — GABAPENTIN 100 MG: 100 CAPSULE ORAL at 21:03

## 2024-04-07 RX ADMIN — DICLOFENAC SODIUM 2 G: 10 GEL TOPICAL at 16:32

## 2024-04-07 RX ADMIN — INSULIN LISPRO 5 UNITS: 100 INJECTION, SOLUTION INTRAVENOUS; SUBCUTANEOUS at 16:21

## 2024-04-07 RX ADMIN — Medication 4 UNITS: at 08:05

## 2024-04-07 RX ADMIN — DOXYCYCLINE 100 MG: 100 INJECTION, POWDER, LYOPHILIZED, FOR SOLUTION INTRAVENOUS at 09:40

## 2024-04-07 RX ADMIN — HYDROCORTISONE SODIUM SUCCINATE 100 MG: 100 INJECTION, POWDER, FOR SOLUTION INTRAMUSCULAR; INTRAVENOUS at 16:22

## 2024-04-07 RX ADMIN — PANTOPRAZOLE SODIUM 40 MG: 40 TABLET, DELAYED RELEASE ORAL at 07:08

## 2024-04-07 RX ADMIN — PRIMIDONE 50 MG: 50 TABLET ORAL at 08:06

## 2024-04-07 RX ADMIN — LISINOPRIL 10 MG: 5 TABLET ORAL at 08:06

## 2024-04-07 RX ADMIN — SODIUM CHLORIDE: 9 INJECTION, SOLUTION INTRAVENOUS at 21:06

## 2024-04-07 RX ADMIN — CARVEDILOL 12.5 MG: 12.5 TABLET, FILM COATED ORAL at 21:02

## 2024-04-07 RX ADMIN — METRONIDAZOLE 500 MG: 500 INJECTION, SOLUTION INTRAVENOUS at 16:22

## 2024-04-07 RX ADMIN — ESCITALOPRAM OXALATE 10 MG: 10 TABLET ORAL at 08:06

## 2024-04-07 RX ADMIN — DICLOFENAC SODIUM 2 G: 10 GEL TOPICAL at 08:55

## 2024-04-07 RX ADMIN — HYDROCORTISONE SODIUM SUCCINATE 100 MG: 100 INJECTION, POWDER, FOR SOLUTION INTRAMUSCULAR; INTRAVENOUS at 10:00

## 2024-04-07 RX ADMIN — INSULIN GLARGINE 30 UNITS: 100 INJECTION, SOLUTION SUBCUTANEOUS at 08:01

## 2024-04-07 RX ADMIN — GABAPENTIN 100 MG: 100 CAPSULE ORAL at 13:39

## 2024-04-07 RX ADMIN — PRIMIDONE 50 MG: 50 TABLET ORAL at 21:03

## 2024-04-07 RX ADMIN — MONTELUKAST 10 MG: 10 TABLET, FILM COATED ORAL at 21:02

## 2024-04-07 RX ADMIN — Medication 6 UNITS: at 11:26

## 2024-04-07 RX ADMIN — HYDROCHLOROTHIAZIDE 25 MG: 25 TABLET ORAL at 08:06

## 2024-04-07 RX ADMIN — DICLOFENAC SODIUM 2 G: 10 GEL TOPICAL at 21:14

## 2024-04-07 RX ADMIN — GABAPENTIN 100 MG: 100 CAPSULE ORAL at 08:06

## 2024-04-07 RX ADMIN — ACETAMINOPHEN 650 MG: 325 TABLET ORAL at 07:03

## 2024-04-07 RX ADMIN — CARVEDILOL 12.5 MG: 12.5 TABLET, FILM COATED ORAL at 08:06

## 2024-04-07 RX ADMIN — ENOXAPARIN SODIUM 40 MG: 100 INJECTION SUBCUTANEOUS at 08:07

## 2024-04-07 RX ADMIN — METRONIDAZOLE 500 MG: 500 INJECTION, SOLUTION INTRAVENOUS at 09:44

## 2024-04-07 RX ADMIN — SODIUM CHLORIDE, PRESERVATIVE FREE 10 ML: 5 INJECTION INTRAVENOUS at 21:04

## 2024-04-07 ASSESSMENT — PAIN DESCRIPTION - LOCATION
LOCATION: ARM
LOCATION: BACK

## 2024-04-07 ASSESSMENT — PAIN SCALES - WONG BAKER: WONGBAKER_NUMERICALRESPONSE: NO HURT

## 2024-04-07 ASSESSMENT — PAIN DESCRIPTION - ORIENTATION
ORIENTATION: POSTERIOR
ORIENTATION: RIGHT

## 2024-04-07 ASSESSMENT — PAIN SCALES - GENERAL
PAINLEVEL_OUTOF10: 2
PAINLEVEL_OUTOF10: 3
PAINLEVEL_OUTOF10: 0
PAINLEVEL_OUTOF10: 5
PAINLEVEL_OUTOF10: 0

## 2024-04-07 ASSESSMENT — PAIN DESCRIPTION - DESCRIPTORS
DESCRIPTORS: ACHING
DESCRIPTORS: ACHING;OTHER (COMMENT)

## 2024-04-08 LAB
ALBUMIN SERPL-MCNC: 2.7 G/DL (ref 3.5–5)
ALBUMIN/GLOB SERPL: 0.8 (ref 1.1–2.2)
ALP SERPL-CCNC: 80 U/L (ref 45–117)
ALT SERPL-CCNC: 28 U/L (ref 12–78)
ANION GAP SERPL CALC-SCNC: 5 MMOL/L (ref 5–15)
ANION GAP SERPL CALC-SCNC: 6 MMOL/L (ref 5–15)
AST SERPL-CCNC: 23 U/L (ref 15–37)
BILIRUB SERPL-MCNC: 0.4 MG/DL (ref 0.2–1)
BUN SERPL-MCNC: 16 MG/DL (ref 6–20)
BUN SERPL-MCNC: 18 MG/DL (ref 6–20)
BUN/CREAT SERPL: 24 (ref 12–20)
BUN/CREAT SERPL: 30 (ref 12–20)
CALCIUM SERPL-MCNC: 7.8 MG/DL (ref 8.5–10.1)
CALCIUM SERPL-MCNC: 8.3 MG/DL (ref 8.5–10.1)
CHLORIDE SERPL-SCNC: 105 MMOL/L (ref 97–108)
CHLORIDE SERPL-SCNC: 106 MMOL/L (ref 97–108)
CO2 SERPL-SCNC: 26 MMOL/L (ref 21–32)
CO2 SERPL-SCNC: 32 MMOL/L (ref 21–32)
CREAT SERPL-MCNC: 0.6 MG/DL (ref 0.55–1.02)
CREAT SERPL-MCNC: 0.68 MG/DL (ref 0.55–1.02)
GLOBULIN SER CALC-MCNC: 3.5 G/DL (ref 2–4)
GLUCOSE BLD STRIP.AUTO-MCNC: 200 MG/DL (ref 65–117)
GLUCOSE BLD STRIP.AUTO-MCNC: 233 MG/DL (ref 65–117)
GLUCOSE BLD STRIP.AUTO-MCNC: 268 MG/DL (ref 65–117)
GLUCOSE BLD STRIP.AUTO-MCNC: 270 MG/DL (ref 65–117)
GLUCOSE BLD STRIP.AUTO-MCNC: 275 MG/DL (ref 65–117)
GLUCOSE BLD STRIP.AUTO-MCNC: 287 MG/DL (ref 65–117)
GLUCOSE SERPL-MCNC: 240 MG/DL (ref 65–100)
GLUCOSE SERPL-MCNC: 280 MG/DL (ref 65–100)
MAGNESIUM SERPL-MCNC: 1.4 MG/DL (ref 1.6–2.4)
POTASSIUM SERPL-SCNC: 2.7 MMOL/L (ref 3.5–5.1)
POTASSIUM SERPL-SCNC: 3.4 MMOL/L (ref 3.5–5.1)
PROT SERPL-MCNC: 6.2 G/DL (ref 6.4–8.2)
SERVICE CMNT-IMP: ABNORMAL
SODIUM SERPL-SCNC: 138 MMOL/L (ref 136–145)
SODIUM SERPL-SCNC: 142 MMOL/L (ref 136–145)

## 2024-04-08 PROCEDURE — 2500000003 HC RX 250 WO HCPCS: Performed by: STUDENT IN AN ORGANIZED HEALTH CARE EDUCATION/TRAINING PROGRAM

## 2024-04-08 PROCEDURE — 82962 GLUCOSE BLOOD TEST: CPT

## 2024-04-08 PROCEDURE — 6370000000 HC RX 637 (ALT 250 FOR IP): Performed by: NURSE PRACTITIONER

## 2024-04-08 PROCEDURE — 83735 ASSAY OF MAGNESIUM: CPT

## 2024-04-08 PROCEDURE — 2580000003 HC RX 258: Performed by: STUDENT IN AN ORGANIZED HEALTH CARE EDUCATION/TRAINING PROGRAM

## 2024-04-08 PROCEDURE — 1100000003 HC PRIVATE W/ TELEMETRY

## 2024-04-08 PROCEDURE — 6360000002 HC RX W HCPCS: Performed by: STUDENT IN AN ORGANIZED HEALTH CARE EDUCATION/TRAINING PROGRAM

## 2024-04-08 PROCEDURE — 80053 COMPREHEN METABOLIC PANEL: CPT

## 2024-04-08 PROCEDURE — 6360000002 HC RX W HCPCS: Performed by: NURSE PRACTITIONER

## 2024-04-08 PROCEDURE — 6370000000 HC RX 637 (ALT 250 FOR IP): Performed by: STUDENT IN AN ORGANIZED HEALTH CARE EDUCATION/TRAINING PROGRAM

## 2024-04-08 PROCEDURE — 36415 COLL VENOUS BLD VENIPUNCTURE: CPT

## 2024-04-08 RX ORDER — POTASSIUM CHLORIDE 7.45 MG/ML
10 INJECTION INTRAVENOUS ONCE
Status: COMPLETED | OUTPATIENT
Start: 2024-04-08 | End: 2024-04-08

## 2024-04-08 RX ADMIN — GABAPENTIN 100 MG: 100 CAPSULE ORAL at 12:32

## 2024-04-08 RX ADMIN — DICLOFENAC SODIUM 2 G: 10 GEL TOPICAL at 22:41

## 2024-04-08 RX ADMIN — INSULIN LISPRO 5 UNITS: 100 INJECTION, SOLUTION INTRAVENOUS; SUBCUTANEOUS at 09:40

## 2024-04-08 RX ADMIN — METRONIDAZOLE 500 MG: 500 INJECTION, SOLUTION INTRAVENOUS at 10:14

## 2024-04-08 RX ADMIN — DOXYCYCLINE 100 MG: 100 INJECTION, POWDER, LYOPHILIZED, FOR SOLUTION INTRAVENOUS at 22:40

## 2024-04-08 RX ADMIN — SODIUM CHLORIDE: 9 INJECTION, SOLUTION INTRAVENOUS at 23:33

## 2024-04-08 RX ADMIN — SODIUM CHLORIDE, PRESERVATIVE FREE 10 ML: 5 INJECTION INTRAVENOUS at 10:18

## 2024-04-08 RX ADMIN — SODIUM CHLORIDE, PRESERVATIVE FREE 10 ML: 5 INJECTION INTRAVENOUS at 22:37

## 2024-04-08 RX ADMIN — PRIMIDONE 50 MG: 50 TABLET ORAL at 09:41

## 2024-04-08 RX ADMIN — HYDROCORTISONE SODIUM SUCCINATE 100 MG: 100 INJECTION, POWDER, FOR SOLUTION INTRAMUSCULAR; INTRAVENOUS at 23:22

## 2024-04-08 RX ADMIN — INSULIN LISPRO 5 UNITS: 100 INJECTION, SOLUTION INTRAVENOUS; SUBCUTANEOUS at 17:07

## 2024-04-08 RX ADMIN — POTASSIUM BICARBONATE 40 MEQ: 782 TABLET, EFFERVESCENT ORAL at 09:41

## 2024-04-08 RX ADMIN — METRONIDAZOLE 500 MG: 500 INJECTION, SOLUTION INTRAVENOUS at 17:12

## 2024-04-08 RX ADMIN — Medication 2 UNITS: at 09:41

## 2024-04-08 RX ADMIN — POTASSIUM CHLORIDE 10 MEQ: 7.46 INJECTION, SOLUTION INTRAVENOUS at 06:21

## 2024-04-08 RX ADMIN — ESCITALOPRAM OXALATE 10 MG: 10 TABLET ORAL at 09:42

## 2024-04-08 RX ADMIN — HYDROCORTISONE SODIUM SUCCINATE 100 MG: 100 INJECTION, POWDER, FOR SOLUTION INTRAMUSCULAR; INTRAVENOUS at 17:06

## 2024-04-08 RX ADMIN — GABAPENTIN 100 MG: 100 CAPSULE ORAL at 22:35

## 2024-04-08 RX ADMIN — DOXYCYCLINE 100 MG: 100 INJECTION, POWDER, LYOPHILIZED, FOR SOLUTION INTRAVENOUS at 10:15

## 2024-04-08 RX ADMIN — Medication 4 UNITS: at 12:32

## 2024-04-08 RX ADMIN — METRONIDAZOLE 500 MG: 500 INJECTION, SOLUTION INTRAVENOUS at 23:33

## 2024-04-08 RX ADMIN — SODIUM CHLORIDE: 9 INJECTION, SOLUTION INTRAVENOUS at 06:20

## 2024-04-08 RX ADMIN — PANTOPRAZOLE SODIUM 40 MG: 40 TABLET, DELAYED RELEASE ORAL at 06:25

## 2024-04-08 RX ADMIN — SODIUM CHLORIDE: 9 INJECTION, SOLUTION INTRAVENOUS at 00:48

## 2024-04-08 RX ADMIN — MONTELUKAST 10 MG: 10 TABLET, FILM COATED ORAL at 22:34

## 2024-04-08 RX ADMIN — METRONIDAZOLE 500 MG: 500 INJECTION, SOLUTION INTRAVENOUS at 00:48

## 2024-04-08 RX ADMIN — HYDROCORTISONE SODIUM SUCCINATE 100 MG: 100 INJECTION, POWDER, FOR SOLUTION INTRAMUSCULAR; INTRAVENOUS at 00:31

## 2024-04-08 RX ADMIN — Medication 4 UNITS: at 17:07

## 2024-04-08 RX ADMIN — HYDROCHLOROTHIAZIDE 25 MG: 25 TABLET ORAL at 09:41

## 2024-04-08 RX ADMIN — GABAPENTIN 100 MG: 100 CAPSULE ORAL at 09:42

## 2024-04-08 RX ADMIN — CARVEDILOL 12.5 MG: 12.5 TABLET, FILM COATED ORAL at 22:35

## 2024-04-08 RX ADMIN — DICLOFENAC SODIUM 2 G: 10 GEL TOPICAL at 09:44

## 2024-04-08 RX ADMIN — HYDROCORTISONE SODIUM SUCCINATE 100 MG: 100 INJECTION, POWDER, FOR SOLUTION INTRAMUSCULAR; INTRAVENOUS at 12:31

## 2024-04-08 RX ADMIN — LISINOPRIL 10 MG: 5 TABLET ORAL at 09:42

## 2024-04-08 RX ADMIN — DICLOFENAC SODIUM 2 G: 10 GEL TOPICAL at 12:32

## 2024-04-08 RX ADMIN — ACETAMINOPHEN 650 MG: 325 TABLET ORAL at 22:35

## 2024-04-08 RX ADMIN — INSULIN GLARGINE 30 UNITS: 100 INJECTION, SOLUTION SUBCUTANEOUS at 09:40

## 2024-04-08 RX ADMIN — INSULIN LISPRO 5 UNITS: 100 INJECTION, SOLUTION INTRAVENOUS; SUBCUTANEOUS at 00:31

## 2024-04-08 RX ADMIN — PRIMIDONE 50 MG: 50 TABLET ORAL at 22:35

## 2024-04-08 RX ADMIN — POTASSIUM BICARBONATE 40 MEQ: 782 TABLET, EFFERVESCENT ORAL at 06:25

## 2024-04-08 RX ADMIN — ENOXAPARIN SODIUM 40 MG: 100 INJECTION SUBCUTANEOUS at 09:41

## 2024-04-08 ASSESSMENT — PAIN DESCRIPTION - LOCATION: LOCATION: BACK;LEG

## 2024-04-08 ASSESSMENT — PAIN SCALES - GENERAL
PAINLEVEL_OUTOF10: 0
PAINLEVEL_OUTOF10: 6
PAINLEVEL_OUTOF10: 0
PAINLEVEL_OUTOF10: 6

## 2024-04-08 ASSESSMENT — PAIN SCALES - WONG BAKER: WONGBAKER_NUMERICALRESPONSE: NO HURT

## 2024-04-08 NOTE — CONSULTS
ORTHOPAEDIC CONSULT NOTE    Subjective:     Date of Consultation:  April 8, 2024      Siomara Collins is a 79 y.o. female who is being seen for loosening of screws at T10 and S1 from previous revision T10-S1 fusion by Dr. Walters in 2018.  Patient admitted for sepsis related to bilateral pneumonia, UTI, sigmoid colitis, COVID, flu.  Patient had CT abdomen performed which showed the above-stated loosening of hardware.  Patient states she has been having back pain for approximately a year now with no acute symptoms.  Patient has been suffering from chronic neuropathy in her upper and lower extremities for almost a year.  She does admit to urinary and bowel complaints (likely secondary to UTI/sigmoid colitis).  She denies any new weakness.  She denies any new sensation changes.  She denies any other complaints at this time.    Patient Active Problem List    Diagnosis Date Noted    Sepsis ([facility]) 04/05/2024    Complicated urinary tract infection 02/27/2024    UTI due to extended-spectrum beta lactamase (ESBL) producing Escherichia coli 02/22/2024    Hypoglycemia 03/12/2023    Neuropathy 07/26/2022    Chronic hypoxemic respiratory failure (HCC) 05/15/2022    Chronic midline low back pain without sciatica 07/06/2021    Tremor 08/13/2020    Primary osteoarthritis of right shoulder 02/13/2020    Alcohol screening 02/12/2020    Gait instability 11/13/2019    Interstitial lung disease ([facility]) 04/09/2019    Cervical stenosis of spine 03/26/2019    Spinal stenosis, cervical region 03/04/2019    Spinal stenosis, lumbar 07/16/2018    Lumbar disc disease 07/11/2018    Sleep disturbance 04/09/2018    Primary osteoarthritis involving multiple joints 01/12/2018    Recurrent depression ([facility]) 01/05/2018    Vitamin D deficiency 10/02/2017    Polymyalgia rheumatica (HCC) 08/24/2017    Hypertension with renal disease 08/24/2017    GI bleed 08/24/2017    IBS (irritable bowel syndrome) 08/24/2017    CKD (chronic kidney disease), stage II

## 2024-04-09 LAB
ACCESSION NUMBER, LLC1M: ABNORMAL
ACINETOBACTER CALCOAC BAUMANNII COMPLEX BY PCR: NOT DETECTED
ANION GAP SERPL CALC-SCNC: 3 MMOL/L (ref 5–15)
B FRAGILIS DNA BLD POS QL NAA+NON-PROBE: NOT DETECTED
BIOFIRE TEST COMMENT: ABNORMAL
BUN SERPL-MCNC: 19 MG/DL (ref 6–20)
BUN/CREAT SERPL: 28 (ref 12–20)
C ALBICANS DNA BLD POS QL NAA+NON-PROBE: NOT DETECTED
C AURIS DNA BLD POS QL NAA+NON-PROBE: NOT DETECTED
C GATTII+NEOFOR DNA BLD POS QL NAA+N-PRB: NOT DETECTED
C GLABRATA DNA BLD POS QL NAA+NON-PROBE: NOT DETECTED
C KRUSEI DNA BLD POS QL NAA+NON-PROBE: NOT DETECTED
C PARAP DNA BLD POS QL NAA+NON-PROBE: NOT DETECTED
C TROPICLS DNA BLD POS QL NAA+NON-PROBE: NOT DETECTED
CALCIUM SERPL-MCNC: 8.7 MG/DL (ref 8.5–10.1)
CHLORIDE SERPL-SCNC: 105 MMOL/L (ref 97–108)
CO2 SERPL-SCNC: 33 MMOL/L (ref 21–32)
CREAT SERPL-MCNC: 0.67 MG/DL (ref 0.55–1.02)
E CLOAC COMP DNA BLD POS NAA+NON-PROBE: NOT DETECTED
E COLI DNA BLD POS QL NAA+NON-PROBE: NOT DETECTED
E FAECALIS DNA BLD POS QL NAA+NON-PROBE: NOT DETECTED
E FAECIUM DNA BLD POS QL NAA+NON-PROBE: NOT DETECTED
ENTEROBACTERALES DNA BLD POS NAA+N-PRB: NOT DETECTED
ERYTHROCYTE [DISTWIDTH] IN BLOOD BY AUTOMATED COUNT: 11.9 % (ref 11.5–14.5)
GLUCOSE BLD STRIP.AUTO-MCNC: 230 MG/DL (ref 65–117)
GLUCOSE BLD STRIP.AUTO-MCNC: 242 MG/DL (ref 65–117)
GLUCOSE BLD STRIP.AUTO-MCNC: 266 MG/DL (ref 65–117)
GLUCOSE BLD STRIP.AUTO-MCNC: 331 MG/DL (ref 65–117)
GLUCOSE SERPL-MCNC: 222 MG/DL (ref 65–100)
GP B STREP DNA BLD POS QL NAA+NON-PROBE: NOT DETECTED
HAEM INFLU DNA BLD POS QL NAA+NON-PROBE: NOT DETECTED
HCT VFR BLD AUTO: 40.2 % (ref 35–47)
HGB BLD-MCNC: 14.2 G/DL (ref 11.5–16)
K OXYTOCA DNA BLD POS QL NAA+NON-PROBE: NOT DETECTED
KLEBSIELLA SP DNA BLD POS QL NAA+NON-PRB: NOT DETECTED
KLEBSIELLA SP DNA BLD POS QL NAA+NON-PRB: NOT DETECTED
L MONOCYTOG DNA BLD POS QL NAA+NON-PROBE: NOT DETECTED
MCH RBC QN AUTO: 35.7 PG (ref 26–34)
MCHC RBC AUTO-ENTMCNC: 35.3 G/DL (ref 30–36.5)
MCV RBC AUTO: 101 FL (ref 80–99)
N MEN DNA BLD POS QL NAA+NON-PROBE: NOT DETECTED
NRBC # BLD: 0 K/UL (ref 0–0.01)
NRBC BLD-RTO: 0 PER 100 WBC
P AERUGINOSA DNA BLD POS NAA+NON-PROBE: NOT DETECTED
PLATELET # BLD AUTO: 201 K/UL (ref 150–400)
PMV BLD AUTO: 9.7 FL (ref 8.9–12.9)
POTASSIUM SERPL-SCNC: 3.5 MMOL/L (ref 3.5–5.1)
PROTEUS SP DNA BLD POS QL NAA+NON-PROBE: NOT DETECTED
RBC # BLD AUTO: 3.98 M/UL (ref 3.8–5.2)
RESISTANT GENE TARGETS: ABNORMAL
S AUREUS DNA BLD POS QL NAA+NON-PROBE: NOT DETECTED
S AUREUS+CONS DNA BLD POS NAA+NON-PROBE: DETECTED
S EPIDERMIDIS DNA BLD POS QL NAA+NON-PRB: NOT DETECTED
S LUGDUNENSIS DNA BLD POS QL NAA+NON-PRB: NOT DETECTED
S MALTOPHILIA DNA BLD POS QL NAA+NON-PRB: NOT DETECTED
S MARCESCENS DNA BLD POS NAA+NON-PROBE: NOT DETECTED
S PNEUM DNA BLD POS QL NAA+NON-PROBE: NOT DETECTED
S PYO DNA BLD POS QL NAA+NON-PROBE: NOT DETECTED
SALMONELLA DNA BLD POS QL NAA+NON-PROBE: NOT DETECTED
SERVICE CMNT-IMP: ABNORMAL
SODIUM SERPL-SCNC: 141 MMOL/L (ref 136–145)
STREPTOCOCCUS DNA BLD POS NAA+NON-PROBE: NOT DETECTED
WBC # BLD AUTO: 5.7 K/UL (ref 3.6–11)

## 2024-04-09 PROCEDURE — 82962 GLUCOSE BLOOD TEST: CPT

## 2024-04-09 PROCEDURE — 6360000002 HC RX W HCPCS: Performed by: STUDENT IN AN ORGANIZED HEALTH CARE EDUCATION/TRAINING PROGRAM

## 2024-04-09 PROCEDURE — 6370000000 HC RX 637 (ALT 250 FOR IP): Performed by: NURSE PRACTITIONER

## 2024-04-09 PROCEDURE — 2500000003 HC RX 250 WO HCPCS: Performed by: STUDENT IN AN ORGANIZED HEALTH CARE EDUCATION/TRAINING PROGRAM

## 2024-04-09 PROCEDURE — 1100000003 HC PRIVATE W/ TELEMETRY

## 2024-04-09 PROCEDURE — 85027 COMPLETE CBC AUTOMATED: CPT

## 2024-04-09 PROCEDURE — 6370000000 HC RX 637 (ALT 250 FOR IP): Performed by: STUDENT IN AN ORGANIZED HEALTH CARE EDUCATION/TRAINING PROGRAM

## 2024-04-09 PROCEDURE — 36415 COLL VENOUS BLD VENIPUNCTURE: CPT

## 2024-04-09 PROCEDURE — 2580000003 HC RX 258: Performed by: STUDENT IN AN ORGANIZED HEALTH CARE EDUCATION/TRAINING PROGRAM

## 2024-04-09 PROCEDURE — 87040 BLOOD CULTURE FOR BACTERIA: CPT

## 2024-04-09 PROCEDURE — 80048 BASIC METABOLIC PNL TOTAL CA: CPT

## 2024-04-09 RX ORDER — DOXYCYCLINE HYCLATE 100 MG
100 TABLET ORAL 2 TIMES DAILY
Qty: 6 TABLET | Refills: 0 | Status: SHIPPED | OUTPATIENT
Start: 2024-04-09 | End: 2024-04-09

## 2024-04-09 RX ORDER — METRONIDAZOLE 500 MG/1
500 TABLET ORAL 3 TIMES DAILY
Qty: 9 TABLET | Refills: 0 | Status: SHIPPED | OUTPATIENT
Start: 2024-04-09 | End: 2024-04-15 | Stop reason: HOSPADM

## 2024-04-09 RX ORDER — METRONIDAZOLE 500 MG/1
500 TABLET ORAL 3 TIMES DAILY
Qty: 9 TABLET | Refills: 0 | Status: SHIPPED | OUTPATIENT
Start: 2024-04-09 | End: 2024-04-09

## 2024-04-09 RX ORDER — DOXYCYCLINE HYCLATE 100 MG
100 TABLET ORAL 2 TIMES DAILY
Qty: 6 TABLET | Refills: 0 | Status: SHIPPED | OUTPATIENT
Start: 2024-04-09 | End: 2024-04-15 | Stop reason: HOSPADM

## 2024-04-09 RX ADMIN — ENOXAPARIN SODIUM 40 MG: 100 INJECTION SUBCUTANEOUS at 10:36

## 2024-04-09 RX ADMIN — ESCITALOPRAM OXALATE 10 MG: 10 TABLET ORAL at 10:38

## 2024-04-09 RX ADMIN — GABAPENTIN 100 MG: 100 CAPSULE ORAL at 22:08

## 2024-04-09 RX ADMIN — CARVEDILOL 12.5 MG: 12.5 TABLET, FILM COATED ORAL at 10:37

## 2024-04-09 RX ADMIN — MONTELUKAST 10 MG: 10 TABLET, FILM COATED ORAL at 22:09

## 2024-04-09 RX ADMIN — DICLOFENAC SODIUM 2 G: 10 GEL TOPICAL at 17:57

## 2024-04-09 RX ADMIN — GABAPENTIN 100 MG: 100 CAPSULE ORAL at 10:36

## 2024-04-09 RX ADMIN — PRIMIDONE 50 MG: 50 TABLET ORAL at 22:09

## 2024-04-09 RX ADMIN — Medication 2 UNITS: at 12:53

## 2024-04-09 RX ADMIN — DICLOFENAC SODIUM 2 G: 10 GEL TOPICAL at 12:55

## 2024-04-09 RX ADMIN — VANCOMYCIN HYDROCHLORIDE 2000 MG: 10 INJECTION, POWDER, LYOPHILIZED, FOR SOLUTION INTRAVENOUS at 15:21

## 2024-04-09 RX ADMIN — INSULIN GLARGINE 30 UNITS: 100 INJECTION, SOLUTION SUBCUTANEOUS at 10:35

## 2024-04-09 RX ADMIN — SODIUM CHLORIDE, PRESERVATIVE FREE 5 ML: 5 INJECTION INTRAVENOUS at 22:09

## 2024-04-09 RX ADMIN — METRONIDAZOLE 500 MG: 500 INJECTION, SOLUTION INTRAVENOUS at 11:01

## 2024-04-09 RX ADMIN — HYDROCHLOROTHIAZIDE 25 MG: 25 TABLET ORAL at 10:37

## 2024-04-09 RX ADMIN — GABAPENTIN 100 MG: 100 CAPSULE ORAL at 15:17

## 2024-04-09 RX ADMIN — METRONIDAZOLE 500 MG: 500 INJECTION, SOLUTION INTRAVENOUS at 17:55

## 2024-04-09 RX ADMIN — CARVEDILOL 12.5 MG: 12.5 TABLET, FILM COATED ORAL at 22:09

## 2024-04-09 RX ADMIN — Medication 6 UNITS: at 17:11

## 2024-04-09 RX ADMIN — LISINOPRIL 10 MG: 5 TABLET ORAL at 10:37

## 2024-04-09 RX ADMIN — HYDROCORTISONE SODIUM SUCCINATE 100 MG: 100 INJECTION, POWDER, FOR SOLUTION INTRAMUSCULAR; INTRAVENOUS at 17:12

## 2024-04-09 RX ADMIN — PRIMIDONE 50 MG: 50 TABLET ORAL at 10:37

## 2024-04-09 RX ADMIN — INSULIN LISPRO 5 UNITS: 100 INJECTION, SOLUTION INTRAVENOUS; SUBCUTANEOUS at 17:12

## 2024-04-09 RX ADMIN — DICLOFENAC SODIUM 2 G: 10 GEL TOPICAL at 22:10

## 2024-04-09 RX ADMIN — DICLOFENAC SODIUM 2 G: 10 GEL TOPICAL at 10:43

## 2024-04-09 RX ADMIN — SODIUM CHLORIDE, PRESERVATIVE FREE 10 ML: 5 INJECTION INTRAVENOUS at 12:53

## 2024-04-09 RX ADMIN — DOXYCYCLINE 100 MG: 100 INJECTION, POWDER, LYOPHILIZED, FOR SOLUTION INTRAVENOUS at 10:44

## 2024-04-09 RX ADMIN — PANTOPRAZOLE SODIUM 40 MG: 40 TABLET, DELAYED RELEASE ORAL at 10:38

## 2024-04-09 RX ADMIN — HYDROCORTISONE SODIUM SUCCINATE 100 MG: 100 INJECTION, POWDER, FOR SOLUTION INTRAMUSCULAR; INTRAVENOUS at 10:56

## 2024-04-09 ASSESSMENT — PAIN SCALES - GENERAL
PAINLEVEL_OUTOF10: 0
PAINLEVEL_OUTOF10: 5

## 2024-04-09 ASSESSMENT — PAIN DESCRIPTION - LOCATION: LOCATION: BACK;LEG

## 2024-04-10 LAB
ANION GAP SERPL CALC-SCNC: 8 MMOL/L (ref 5–15)
BACTERIA SPEC CULT: ABNORMAL
BACTERIA SPEC CULT: ABNORMAL
BUN SERPL-MCNC: 20 MG/DL (ref 6–20)
BUN/CREAT SERPL: 29 (ref 12–20)
CALCIUM SERPL-MCNC: 7.8 MG/DL (ref 8.5–10.1)
CHLORIDE SERPL-SCNC: 101 MMOL/L (ref 97–108)
CO2 SERPL-SCNC: 29 MMOL/L (ref 21–32)
CREAT SERPL-MCNC: 0.69 MG/DL (ref 0.55–1.02)
ERYTHROCYTE [DISTWIDTH] IN BLOOD BY AUTOMATED COUNT: 11.9 % (ref 11.5–14.5)
GLUCOSE BLD STRIP.AUTO-MCNC: 233 MG/DL (ref 65–117)
GLUCOSE BLD STRIP.AUTO-MCNC: 251 MG/DL (ref 65–117)
GLUCOSE BLD STRIP.AUTO-MCNC: 299 MG/DL (ref 65–117)
GLUCOSE BLD STRIP.AUTO-MCNC: 366 MG/DL (ref 65–117)
GLUCOSE SERPL-MCNC: 326 MG/DL (ref 65–100)
HCT VFR BLD AUTO: 38.1 % (ref 35–47)
HGB BLD-MCNC: 13.5 G/DL (ref 11.5–16)
MCH RBC QN AUTO: 34.3 PG (ref 26–34)
MCHC RBC AUTO-ENTMCNC: 35.4 G/DL (ref 30–36.5)
MCV RBC AUTO: 96.7 FL (ref 80–99)
NRBC # BLD: 0 K/UL (ref 0–0.01)
NRBC BLD-RTO: 0 PER 100 WBC
PLATELET # BLD AUTO: 206 K/UL (ref 150–400)
PMV BLD AUTO: 9.7 FL (ref 8.9–12.9)
POTASSIUM SERPL-SCNC: 2.4 MMOL/L (ref 3.5–5.1)
RBC # BLD AUTO: 3.94 M/UL (ref 3.8–5.2)
SERVICE CMNT-IMP: ABNORMAL
SODIUM SERPL-SCNC: 138 MMOL/L (ref 136–145)
WBC # BLD AUTO: 6.9 K/UL (ref 3.6–11)

## 2024-04-10 PROCEDURE — 82962 GLUCOSE BLOOD TEST: CPT

## 2024-04-10 PROCEDURE — 2580000003 HC RX 258: Performed by: STUDENT IN AN ORGANIZED HEALTH CARE EDUCATION/TRAINING PROGRAM

## 2024-04-10 PROCEDURE — 85027 COMPLETE CBC AUTOMATED: CPT

## 2024-04-10 PROCEDURE — 6370000000 HC RX 637 (ALT 250 FOR IP): Performed by: STUDENT IN AN ORGANIZED HEALTH CARE EDUCATION/TRAINING PROGRAM

## 2024-04-10 PROCEDURE — 6370000000 HC RX 637 (ALT 250 FOR IP): Performed by: NURSE PRACTITIONER

## 2024-04-10 PROCEDURE — 80048 BASIC METABOLIC PNL TOTAL CA: CPT

## 2024-04-10 PROCEDURE — 6360000002 HC RX W HCPCS: Performed by: NURSE PRACTITIONER

## 2024-04-10 PROCEDURE — 6360000002 HC RX W HCPCS: Performed by: STUDENT IN AN ORGANIZED HEALTH CARE EDUCATION/TRAINING PROGRAM

## 2024-04-10 PROCEDURE — 36415 COLL VENOUS BLD VENIPUNCTURE: CPT

## 2024-04-10 PROCEDURE — 1100000003 HC PRIVATE W/ TELEMETRY

## 2024-04-10 RX ORDER — HYDRALAZINE HYDROCHLORIDE 20 MG/ML
10 INJECTION INTRAMUSCULAR; INTRAVENOUS EVERY 4 HOURS PRN
Status: DISCONTINUED | OUTPATIENT
Start: 2024-04-10 | End: 2024-04-15 | Stop reason: HOSPADM

## 2024-04-10 RX ORDER — POTASSIUM CHLORIDE 20 MEQ/1
40 TABLET, EXTENDED RELEASE ORAL EVERY 6 HOURS
Status: COMPLETED | OUTPATIENT
Start: 2024-04-10 | End: 2024-04-11

## 2024-04-10 RX ORDER — POTASSIUM CHLORIDE 7.45 MG/ML
10 INJECTION INTRAVENOUS
Status: COMPLETED | OUTPATIENT
Start: 2024-04-10 | End: 2024-04-10

## 2024-04-10 RX ADMIN — HYDROCORTISONE SODIUM SUCCINATE 100 MG: 100 INJECTION, POWDER, FOR SOLUTION INTRAMUSCULAR; INTRAVENOUS at 01:02

## 2024-04-10 RX ADMIN — DICLOFENAC SODIUM 2 G: 10 GEL TOPICAL at 16:41

## 2024-04-10 RX ADMIN — HYDROCORTISONE SODIUM SUCCINATE 100 MG: 100 INJECTION, POWDER, FOR SOLUTION INTRAMUSCULAR; INTRAVENOUS at 10:30

## 2024-04-10 RX ADMIN — METRONIDAZOLE 500 MG: 500 INJECTION, SOLUTION INTRAVENOUS at 10:50

## 2024-04-10 RX ADMIN — Medication 4 UNITS: at 12:16

## 2024-04-10 RX ADMIN — HYDROCHLOROTHIAZIDE 25 MG: 25 TABLET ORAL at 10:28

## 2024-04-10 RX ADMIN — DICLOFENAC SODIUM 2 G: 10 GEL TOPICAL at 12:17

## 2024-04-10 RX ADMIN — ACETAMINOPHEN 650 MG: 325 TABLET ORAL at 21:28

## 2024-04-10 RX ADMIN — CARVEDILOL 12.5 MG: 12.5 TABLET, FILM COATED ORAL at 10:30

## 2024-04-10 RX ADMIN — HYDRALAZINE HYDROCHLORIDE 10 MG: 20 INJECTION INTRAMUSCULAR; INTRAVENOUS at 16:15

## 2024-04-10 RX ADMIN — ENOXAPARIN SODIUM 40 MG: 100 INJECTION SUBCUTANEOUS at 10:27

## 2024-04-10 RX ADMIN — ESCITALOPRAM OXALATE 10 MG: 10 TABLET ORAL at 10:27

## 2024-04-10 RX ADMIN — INSULIN LISPRO 5 UNITS: 100 INJECTION, SOLUTION INTRAVENOUS; SUBCUTANEOUS at 01:02

## 2024-04-10 RX ADMIN — PANTOPRAZOLE SODIUM 40 MG: 40 TABLET, DELAYED RELEASE ORAL at 07:32

## 2024-04-10 RX ADMIN — PRIMIDONE 50 MG: 50 TABLET ORAL at 21:04

## 2024-04-10 RX ADMIN — SODIUM CHLORIDE, PRESERVATIVE FREE 10 ML: 5 INJECTION INTRAVENOUS at 21:05

## 2024-04-10 RX ADMIN — POTASSIUM CHLORIDE 10 MEQ: 10 INJECTION, SOLUTION INTRAVENOUS at 21:17

## 2024-04-10 RX ADMIN — INSULIN LISPRO 4 UNITS: 100 INJECTION, SOLUTION INTRAVENOUS; SUBCUTANEOUS at 22:38

## 2024-04-10 RX ADMIN — LISINOPRIL 10 MG: 5 TABLET ORAL at 10:28

## 2024-04-10 RX ADMIN — MONTELUKAST 10 MG: 10 TABLET, FILM COATED ORAL at 21:04

## 2024-04-10 RX ADMIN — POTASSIUM CHLORIDE 40 MEQ: 1500 TABLET, EXTENDED RELEASE ORAL at 18:11

## 2024-04-10 RX ADMIN — GABAPENTIN 100 MG: 100 CAPSULE ORAL at 10:27

## 2024-04-10 RX ADMIN — CARVEDILOL 12.5 MG: 12.5 TABLET, FILM COATED ORAL at 21:04

## 2024-04-10 RX ADMIN — POTASSIUM CHLORIDE 10 MEQ: 10 INJECTION, SOLUTION INTRAVENOUS at 18:12

## 2024-04-10 RX ADMIN — Medication 4 UNITS: at 16:40

## 2024-04-10 RX ADMIN — PRIMIDONE 50 MG: 50 TABLET ORAL at 10:28

## 2024-04-10 RX ADMIN — SODIUM CHLORIDE, PRESERVATIVE FREE 10 ML: 5 INJECTION INTRAVENOUS at 10:29

## 2024-04-10 RX ADMIN — METRONIDAZOLE 500 MG: 500 INJECTION, SOLUTION INTRAVENOUS at 01:02

## 2024-04-10 RX ADMIN — GABAPENTIN 100 MG: 100 CAPSULE ORAL at 14:42

## 2024-04-10 RX ADMIN — INSULIN GLARGINE 30 UNITS: 100 INJECTION, SOLUTION SUBCUTANEOUS at 10:29

## 2024-04-10 RX ADMIN — GABAPENTIN 100 MG: 100 CAPSULE ORAL at 21:04

## 2024-04-10 RX ADMIN — METRONIDAZOLE 500 MG: 500 INJECTION, SOLUTION INTRAVENOUS at 16:18

## 2024-04-10 RX ADMIN — INSULIN LISPRO 5 UNITS: 100 INJECTION, SOLUTION INTRAVENOUS; SUBCUTANEOUS at 16:40

## 2024-04-10 RX ADMIN — VANCOMYCIN HYDROCHLORIDE 1000 MG: 1 INJECTION, POWDER, LYOPHILIZED, FOR SOLUTION INTRAVENOUS at 14:43

## 2024-04-10 RX ADMIN — DICLOFENAC SODIUM 2 G: 10 GEL TOPICAL at 10:24

## 2024-04-10 RX ADMIN — HYDROCORTISONE SODIUM SUCCINATE 100 MG: 100 INJECTION, POWDER, FOR SOLUTION INTRAMUSCULAR; INTRAVENOUS at 16:20

## 2024-04-10 RX ADMIN — ACETAMINOPHEN 650 MG: 325 TABLET ORAL at 10:27

## 2024-04-10 RX ADMIN — DICLOFENAC SODIUM 2 G: 10 GEL TOPICAL at 21:06

## 2024-04-10 RX ADMIN — VANCOMYCIN HYDROCHLORIDE 1000 MG: 1 INJECTION, POWDER, LYOPHILIZED, FOR SOLUTION INTRAVENOUS at 03:34

## 2024-04-10 ASSESSMENT — PAIN DESCRIPTION - LOCATION
LOCATION: ARM
LOCATION: KNEE;LEG;TOE (COMMENT WHICH ONE)
LOCATION: BACK;LEG

## 2024-04-10 ASSESSMENT — PAIN DESCRIPTION - DESCRIPTORS: DESCRIPTORS: BURNING

## 2024-04-10 ASSESSMENT — PAIN SCALES - GENERAL
PAINLEVEL_OUTOF10: 4
PAINLEVEL_OUTOF10: 5
PAINLEVEL_OUTOF10: 6
PAINLEVEL_OUTOF10: 7

## 2024-04-10 ASSESSMENT — PAIN DESCRIPTION - ORIENTATION: ORIENTATION: LEFT

## 2024-04-11 LAB
ANION GAP SERPL CALC-SCNC: 7 MMOL/L (ref 5–15)
BACTERIA SPEC CULT: NORMAL
BASOPHILS # BLD: 0 K/UL (ref 0–0.1)
BASOPHILS NFR BLD: 0 % (ref 0–1)
BUN SERPL-MCNC: 19 MG/DL (ref 6–20)
BUN/CREAT SERPL: 32 (ref 12–20)
CALCIUM SERPL-MCNC: 8 MG/DL (ref 8.5–10.1)
CHLORIDE SERPL-SCNC: 104 MMOL/L (ref 97–108)
CO2 SERPL-SCNC: 29 MMOL/L (ref 21–32)
CREAT SERPL-MCNC: 0.6 MG/DL (ref 0.55–1.02)
DIFFERENTIAL METHOD BLD: ABNORMAL
EOSINOPHIL # BLD: 0 K/UL (ref 0–0.4)
EOSINOPHIL NFR BLD: 0 % (ref 0–7)
ERYTHROCYTE [DISTWIDTH] IN BLOOD BY AUTOMATED COUNT: 11.9 % (ref 11.5–14.5)
GLUCOSE BLD STRIP.AUTO-MCNC: 205 MG/DL (ref 65–117)
GLUCOSE BLD STRIP.AUTO-MCNC: 244 MG/DL (ref 65–117)
GLUCOSE BLD STRIP.AUTO-MCNC: 250 MG/DL (ref 65–117)
GLUCOSE BLD STRIP.AUTO-MCNC: 289 MG/DL (ref 65–117)
GLUCOSE SERPL-MCNC: 270 MG/DL (ref 65–100)
HCT VFR BLD AUTO: 38.7 % (ref 35–47)
HGB BLD-MCNC: 13.9 G/DL (ref 11.5–16)
IMM GRANULOCYTES # BLD AUTO: 0.1 K/UL (ref 0–0.04)
IMM GRANULOCYTES NFR BLD AUTO: 1 % (ref 0–0.5)
LYMPHOCYTES # BLD: 0.6 K/UL (ref 0.8–3.5)
LYMPHOCYTES NFR BLD: 10 % (ref 12–49)
MAGNESIUM SERPL-MCNC: 1.7 MG/DL (ref 1.6–2.4)
MCH RBC QN AUTO: 35.2 PG (ref 26–34)
MCHC RBC AUTO-ENTMCNC: 35.9 G/DL (ref 30–36.5)
MCV RBC AUTO: 98 FL (ref 80–99)
MONOCYTES # BLD: 0.4 K/UL (ref 0–1)
MONOCYTES NFR BLD: 6 % (ref 5–13)
NEUTS SEG # BLD: 5.2 K/UL (ref 1.8–8)
NEUTS SEG NFR BLD: 83 % (ref 32–75)
NRBC # BLD: 0 K/UL (ref 0–0.01)
NRBC BLD-RTO: 0 PER 100 WBC
PLATELET # BLD AUTO: 204 K/UL (ref 150–400)
PMV BLD AUTO: 9.8 FL (ref 8.9–12.9)
POTASSIUM SERPL-SCNC: 2.8 MMOL/L (ref 3.5–5.1)
PROCALCITONIN SERPL-MCNC: <0.05 NG/ML
RBC # BLD AUTO: 3.95 M/UL (ref 3.8–5.2)
SERVICE CMNT-IMP: ABNORMAL
SERVICE CMNT-IMP: NORMAL
SODIUM SERPL-SCNC: 140 MMOL/L (ref 136–145)
WBC # BLD AUTO: 6.3 K/UL (ref 3.6–11)

## 2024-04-11 PROCEDURE — 82962 GLUCOSE BLOOD TEST: CPT

## 2024-04-11 PROCEDURE — 85025 COMPLETE CBC W/AUTO DIFF WBC: CPT

## 2024-04-11 PROCEDURE — 80048 BASIC METABOLIC PNL TOTAL CA: CPT

## 2024-04-11 PROCEDURE — 6360000002 HC RX W HCPCS: Performed by: STUDENT IN AN ORGANIZED HEALTH CARE EDUCATION/TRAINING PROGRAM

## 2024-04-11 PROCEDURE — 83735 ASSAY OF MAGNESIUM: CPT

## 2024-04-11 PROCEDURE — 6360000002 HC RX W HCPCS: Performed by: NURSE PRACTITIONER

## 2024-04-11 PROCEDURE — 6370000000 HC RX 637 (ALT 250 FOR IP): Performed by: NURSE PRACTITIONER

## 2024-04-11 PROCEDURE — 2580000003 HC RX 258: Performed by: STUDENT IN AN ORGANIZED HEALTH CARE EDUCATION/TRAINING PROGRAM

## 2024-04-11 PROCEDURE — 36415 COLL VENOUS BLD VENIPUNCTURE: CPT

## 2024-04-11 PROCEDURE — 6370000000 HC RX 637 (ALT 250 FOR IP): Performed by: STUDENT IN AN ORGANIZED HEALTH CARE EDUCATION/TRAINING PROGRAM

## 2024-04-11 PROCEDURE — 1100000003 HC PRIVATE W/ TELEMETRY

## 2024-04-11 PROCEDURE — 84145 PROCALCITONIN (PCT): CPT

## 2024-04-11 RX ORDER — CEFDINIR 300 MG/1
300 CAPSULE ORAL EVERY 12 HOURS SCHEDULED
Status: DISCONTINUED | OUTPATIENT
Start: 2024-04-11 | End: 2024-04-15 | Stop reason: HOSPADM

## 2024-04-11 RX ORDER — POTASSIUM CHLORIDE 20 MEQ/1
40 TABLET, EXTENDED RELEASE ORAL 3 TIMES DAILY
Status: COMPLETED | OUTPATIENT
Start: 2024-04-11 | End: 2024-04-11

## 2024-04-11 RX ADMIN — DICLOFENAC SODIUM 2 G: 10 GEL TOPICAL at 16:27

## 2024-04-11 RX ADMIN — SODIUM CHLORIDE, PRESERVATIVE FREE 10 ML: 5 INJECTION INTRAVENOUS at 08:32

## 2024-04-11 RX ADMIN — CARVEDILOL 12.5 MG: 12.5 TABLET, FILM COATED ORAL at 22:27

## 2024-04-11 RX ADMIN — Medication 2 UNITS: at 12:34

## 2024-04-11 RX ADMIN — INSULIN LISPRO 5 UNITS: 100 INJECTION, SOLUTION INTRAVENOUS; SUBCUTANEOUS at 16:22

## 2024-04-11 RX ADMIN — SODIUM CHLORIDE, PRESERVATIVE FREE 10 ML: 5 INJECTION INTRAVENOUS at 22:51

## 2024-04-11 RX ADMIN — CARVEDILOL 12.5 MG: 12.5 TABLET, FILM COATED ORAL at 10:36

## 2024-04-11 RX ADMIN — GABAPENTIN 100 MG: 100 CAPSULE ORAL at 08:28

## 2024-04-11 RX ADMIN — ESCITALOPRAM OXALATE 10 MG: 10 TABLET ORAL at 08:30

## 2024-04-11 RX ADMIN — POTASSIUM CHLORIDE 40 MEQ: 1500 TABLET, EXTENDED RELEASE ORAL at 15:15

## 2024-04-11 RX ADMIN — INSULIN LISPRO 5 UNITS: 100 INJECTION, SOLUTION INTRAVENOUS; SUBCUTANEOUS at 08:24

## 2024-04-11 RX ADMIN — Medication 4 UNITS: at 16:22

## 2024-04-11 RX ADMIN — GABAPENTIN 100 MG: 100 CAPSULE ORAL at 22:27

## 2024-04-11 RX ADMIN — LISINOPRIL 10 MG: 5 TABLET ORAL at 08:30

## 2024-04-11 RX ADMIN — DICLOFENAC SODIUM 2 G: 10 GEL TOPICAL at 13:19

## 2024-04-11 RX ADMIN — POTASSIUM CHLORIDE 40 MEQ: 1500 TABLET, EXTENDED RELEASE ORAL at 00:37

## 2024-04-11 RX ADMIN — VANCOMYCIN HYDROCHLORIDE 1000 MG: 1 INJECTION, POWDER, LYOPHILIZED, FOR SOLUTION INTRAVENOUS at 03:58

## 2024-04-11 RX ADMIN — HYDROCORTISONE SODIUM SUCCINATE 100 MG: 100 INJECTION, POWDER, FOR SOLUTION INTRAMUSCULAR; INTRAVENOUS at 16:23

## 2024-04-11 RX ADMIN — METRONIDAZOLE 500 MG: 500 INJECTION, SOLUTION INTRAVENOUS at 16:29

## 2024-04-11 RX ADMIN — ENOXAPARIN SODIUM 40 MG: 100 INJECTION SUBCUTANEOUS at 08:25

## 2024-04-11 RX ADMIN — INSULIN LISPRO 5 UNITS: 100 INJECTION, SOLUTION INTRAVENOUS; SUBCUTANEOUS at 00:37

## 2024-04-11 RX ADMIN — METRONIDAZOLE 500 MG: 500 INJECTION, SOLUTION INTRAVENOUS at 00:40

## 2024-04-11 RX ADMIN — GABAPENTIN 100 MG: 100 CAPSULE ORAL at 15:15

## 2024-04-11 RX ADMIN — MONTELUKAST 10 MG: 10 TABLET, FILM COATED ORAL at 22:27

## 2024-04-11 RX ADMIN — POTASSIUM CHLORIDE 40 MEQ: 1500 TABLET, EXTENDED RELEASE ORAL at 08:28

## 2024-04-11 RX ADMIN — Medication 4 UNITS: at 08:24

## 2024-04-11 RX ADMIN — ACETAMINOPHEN 650 MG: 325 TABLET ORAL at 16:35

## 2024-04-11 RX ADMIN — METRONIDAZOLE 500 MG: 500 INJECTION, SOLUTION INTRAVENOUS at 08:37

## 2024-04-11 RX ADMIN — PRIMIDONE 50 MG: 50 TABLET ORAL at 22:27

## 2024-04-11 RX ADMIN — DICLOFENAC SODIUM 2 G: 10 GEL TOPICAL at 08:32

## 2024-04-11 RX ADMIN — HYDROCHLOROTHIAZIDE 25 MG: 25 TABLET ORAL at 08:30

## 2024-04-11 RX ADMIN — POTASSIUM CHLORIDE 40 MEQ: 1500 TABLET, EXTENDED RELEASE ORAL at 22:26

## 2024-04-11 RX ADMIN — HYDRALAZINE HYDROCHLORIDE 10 MG: 20 INJECTION INTRAMUSCULAR; INTRAVENOUS at 22:42

## 2024-04-11 RX ADMIN — HYDROCORTISONE SODIUM SUCCINATE 100 MG: 100 INJECTION, POWDER, FOR SOLUTION INTRAMUSCULAR; INTRAVENOUS at 08:30

## 2024-04-11 RX ADMIN — CEFDINIR 300 MG: 300 CAPSULE ORAL at 22:27

## 2024-04-11 RX ADMIN — HYDRALAZINE HYDROCHLORIDE 10 MG: 20 INJECTION INTRAMUSCULAR; INTRAVENOUS at 05:37

## 2024-04-11 RX ADMIN — CEFDINIR 300 MG: 300 CAPSULE ORAL at 10:33

## 2024-04-11 RX ADMIN — PRIMIDONE 50 MG: 50 TABLET ORAL at 08:30

## 2024-04-11 RX ADMIN — INSULIN GLARGINE 30 UNITS: 100 INJECTION, SOLUTION SUBCUTANEOUS at 08:24

## 2024-04-11 RX ADMIN — PANTOPRAZOLE SODIUM 40 MG: 40 TABLET, DELAYED RELEASE ORAL at 05:39

## 2024-04-11 RX ADMIN — DICLOFENAC SODIUM 2 G: 10 GEL TOPICAL at 22:28

## 2024-04-11 RX ADMIN — HYDROCORTISONE SODIUM SUCCINATE 100 MG: 100 INJECTION, POWDER, FOR SOLUTION INTRAMUSCULAR; INTRAVENOUS at 00:37

## 2024-04-11 ASSESSMENT — PAIN DESCRIPTION - DESCRIPTORS
DESCRIPTORS: ACHING
DESCRIPTORS: ACHING;BURNING;TINGLING

## 2024-04-11 ASSESSMENT — PAIN DESCRIPTION - ORIENTATION
ORIENTATION: LEFT;RIGHT
ORIENTATION: RIGHT;LEFT

## 2024-04-11 ASSESSMENT — PAIN DESCRIPTION - LOCATION
LOCATION: LEG;FOOT
LOCATION: KNEE;LEG;TOE (COMMENT WHICH ONE)

## 2024-04-12 LAB
ANION GAP SERPL CALC-SCNC: 3 MMOL/L (ref 5–15)
BASOPHILS # BLD: 0 K/UL (ref 0–0.1)
BASOPHILS NFR BLD: 0 % (ref 0–1)
BUN SERPL-MCNC: 18 MG/DL (ref 6–20)
BUN/CREAT SERPL: 32 (ref 12–20)
CALCIUM SERPL-MCNC: 7.8 MG/DL (ref 8.5–10.1)
CHLORIDE SERPL-SCNC: 108 MMOL/L (ref 97–108)
CO2 SERPL-SCNC: 29 MMOL/L (ref 21–32)
CREAT SERPL-MCNC: 0.56 MG/DL (ref 0.55–1.02)
DIFFERENTIAL METHOD BLD: ABNORMAL
EOSINOPHIL # BLD: 0 K/UL (ref 0–0.4)
EOSINOPHIL NFR BLD: 0 % (ref 0–7)
ERYTHROCYTE [DISTWIDTH] IN BLOOD BY AUTOMATED COUNT: 12.4 % (ref 11.5–14.5)
GLUCOSE BLD STRIP.AUTO-MCNC: 206 MG/DL (ref 65–117)
GLUCOSE BLD STRIP.AUTO-MCNC: 220 MG/DL (ref 65–117)
GLUCOSE BLD STRIP.AUTO-MCNC: 246 MG/DL (ref 65–117)
GLUCOSE BLD STRIP.AUTO-MCNC: 277 MG/DL (ref 65–117)
GLUCOSE SERPL-MCNC: 254 MG/DL (ref 65–100)
HCT VFR BLD AUTO: 38.7 % (ref 35–47)
HGB BLD-MCNC: 13.3 G/DL (ref 11.5–16)
IMM GRANULOCYTES # BLD AUTO: 0.1 K/UL (ref 0–0.04)
IMM GRANULOCYTES NFR BLD AUTO: 1 % (ref 0–0.5)
LYMPHOCYTES # BLD: 0.7 K/UL (ref 0.8–3.5)
LYMPHOCYTES NFR BLD: 12 % (ref 12–49)
MAGNESIUM SERPL-MCNC: 1.9 MG/DL (ref 1.6–2.4)
MCH RBC QN AUTO: 34.1 PG (ref 26–34)
MCHC RBC AUTO-ENTMCNC: 34.4 G/DL (ref 30–36.5)
MCV RBC AUTO: 99.2 FL (ref 80–99)
MONOCYTES # BLD: 0.3 K/UL (ref 0–1)
MONOCYTES NFR BLD: 5 % (ref 5–13)
NEUTS SEG # BLD: 4.9 K/UL (ref 1.8–8)
NEUTS SEG NFR BLD: 81 % (ref 32–75)
NRBC # BLD: 0 K/UL (ref 0–0.01)
NRBC BLD-RTO: 0 PER 100 WBC
PLATELET # BLD AUTO: 201 K/UL (ref 150–400)
PMV BLD AUTO: 9.6 FL (ref 8.9–12.9)
POTASSIUM SERPL-SCNC: 3.4 MMOL/L (ref 3.5–5.1)
RBC # BLD AUTO: 3.9 M/UL (ref 3.8–5.2)
SERVICE CMNT-IMP: ABNORMAL
SODIUM SERPL-SCNC: 140 MMOL/L (ref 136–145)
WBC # BLD AUTO: 6.1 K/UL (ref 3.6–11)

## 2024-04-12 PROCEDURE — 2580000003 HC RX 258: Performed by: STUDENT IN AN ORGANIZED HEALTH CARE EDUCATION/TRAINING PROGRAM

## 2024-04-12 PROCEDURE — 6360000002 HC RX W HCPCS: Performed by: NURSE PRACTITIONER

## 2024-04-12 PROCEDURE — 97535 SELF CARE MNGMENT TRAINING: CPT

## 2024-04-12 PROCEDURE — 97530 THERAPEUTIC ACTIVITIES: CPT

## 2024-04-12 PROCEDURE — 6360000002 HC RX W HCPCS: Performed by: STUDENT IN AN ORGANIZED HEALTH CARE EDUCATION/TRAINING PROGRAM

## 2024-04-12 PROCEDURE — 85025 COMPLETE CBC W/AUTO DIFF WBC: CPT

## 2024-04-12 PROCEDURE — 6370000000 HC RX 637 (ALT 250 FOR IP): Performed by: NURSE PRACTITIONER

## 2024-04-12 PROCEDURE — 82962 GLUCOSE BLOOD TEST: CPT

## 2024-04-12 PROCEDURE — 36415 COLL VENOUS BLD VENIPUNCTURE: CPT

## 2024-04-12 PROCEDURE — 97165 OT EVAL LOW COMPLEX 30 MIN: CPT

## 2024-04-12 PROCEDURE — 1100000003 HC PRIVATE W/ TELEMETRY

## 2024-04-12 PROCEDURE — 6370000000 HC RX 637 (ALT 250 FOR IP): Performed by: STUDENT IN AN ORGANIZED HEALTH CARE EDUCATION/TRAINING PROGRAM

## 2024-04-12 PROCEDURE — 97162 PT EVAL MOD COMPLEX 30 MIN: CPT

## 2024-04-12 PROCEDURE — 80048 BASIC METABOLIC PNL TOTAL CA: CPT

## 2024-04-12 PROCEDURE — 83735 ASSAY OF MAGNESIUM: CPT

## 2024-04-12 RX ORDER — POTASSIUM CHLORIDE 20 MEQ/1
20 TABLET, EXTENDED RELEASE ORAL ONCE
Status: COMPLETED | OUTPATIENT
Start: 2024-04-12 | End: 2024-04-12

## 2024-04-12 RX ADMIN — PRIMIDONE 50 MG: 50 TABLET ORAL at 21:52

## 2024-04-12 RX ADMIN — PRIMIDONE 50 MG: 50 TABLET ORAL at 07:58

## 2024-04-12 RX ADMIN — ACETAMINOPHEN 650 MG: 325 TABLET ORAL at 09:50

## 2024-04-12 RX ADMIN — CEFDINIR 300 MG: 300 CAPSULE ORAL at 07:58

## 2024-04-12 RX ADMIN — DICLOFENAC SODIUM 2 G: 10 GEL TOPICAL at 13:28

## 2024-04-12 RX ADMIN — Medication 4 UNITS: at 17:15

## 2024-04-12 RX ADMIN — HYDROCORTISONE SODIUM SUCCINATE 100 MG: 100 INJECTION, POWDER, FOR SOLUTION INTRAMUSCULAR; INTRAVENOUS at 00:36

## 2024-04-12 RX ADMIN — HYDRALAZINE HYDROCHLORIDE 10 MG: 20 INJECTION INTRAMUSCULAR; INTRAVENOUS at 17:43

## 2024-04-12 RX ADMIN — ESCITALOPRAM OXALATE 10 MG: 10 TABLET ORAL at 07:58

## 2024-04-12 RX ADMIN — INSULIN LISPRO 5 UNITS: 100 INJECTION, SOLUTION INTRAVENOUS; SUBCUTANEOUS at 07:59

## 2024-04-12 RX ADMIN — INSULIN LISPRO 4 UNITS: 100 INJECTION, SOLUTION INTRAVENOUS; SUBCUTANEOUS at 21:52

## 2024-04-12 RX ADMIN — INSULIN LISPRO 5 UNITS: 100 INJECTION, SOLUTION INTRAVENOUS; SUBCUTANEOUS at 00:36

## 2024-04-12 RX ADMIN — SODIUM CHLORIDE, PRESERVATIVE FREE 10 ML: 5 INJECTION INTRAVENOUS at 21:03

## 2024-04-12 RX ADMIN — MONTELUKAST 10 MG: 10 TABLET, FILM COATED ORAL at 21:51

## 2024-04-12 RX ADMIN — INSULIN GLARGINE 30 UNITS: 100 INJECTION, SOLUTION SUBCUTANEOUS at 07:59

## 2024-04-12 RX ADMIN — Medication 2 UNITS: at 08:00

## 2024-04-12 RX ADMIN — SODIUM CHLORIDE, PRESERVATIVE FREE 10 ML: 5 INJECTION INTRAVENOUS at 07:58

## 2024-04-12 RX ADMIN — CEFDINIR 300 MG: 300 CAPSULE ORAL at 21:51

## 2024-04-12 RX ADMIN — METRONIDAZOLE 500 MG: 500 INJECTION, SOLUTION INTRAVENOUS at 08:01

## 2024-04-12 RX ADMIN — GABAPENTIN 100 MG: 100 CAPSULE ORAL at 07:58

## 2024-04-12 RX ADMIN — CARVEDILOL 12.5 MG: 12.5 TABLET, FILM COATED ORAL at 07:58

## 2024-04-12 RX ADMIN — HYDROCORTISONE SODIUM SUCCINATE 100 MG: 100 INJECTION, POWDER, FOR SOLUTION INTRAMUSCULAR; INTRAVENOUS at 17:14

## 2024-04-12 RX ADMIN — DICLOFENAC SODIUM 2 G: 10 GEL TOPICAL at 17:14

## 2024-04-12 RX ADMIN — DICLOFENAC SODIUM 2 G: 10 GEL TOPICAL at 09:45

## 2024-04-12 RX ADMIN — CARVEDILOL 12.5 MG: 12.5 TABLET, FILM COATED ORAL at 21:51

## 2024-04-12 RX ADMIN — HYDRALAZINE HYDROCHLORIDE 10 MG: 20 INJECTION INTRAMUSCULAR; INTRAVENOUS at 05:36

## 2024-04-12 RX ADMIN — GABAPENTIN 100 MG: 100 CAPSULE ORAL at 13:28

## 2024-04-12 RX ADMIN — LISINOPRIL 10 MG: 5 TABLET ORAL at 07:58

## 2024-04-12 RX ADMIN — HYDROCORTISONE SODIUM SUCCINATE 100 MG: 100 INJECTION, POWDER, FOR SOLUTION INTRAMUSCULAR; INTRAVENOUS at 07:57

## 2024-04-12 RX ADMIN — ENOXAPARIN SODIUM 40 MG: 100 INJECTION SUBCUTANEOUS at 08:00

## 2024-04-12 RX ADMIN — METRONIDAZOLE 500 MG: 500 INJECTION, SOLUTION INTRAVENOUS at 17:16

## 2024-04-12 RX ADMIN — METRONIDAZOLE 500 MG: 500 INJECTION, SOLUTION INTRAVENOUS at 00:36

## 2024-04-12 RX ADMIN — DICLOFENAC SODIUM 2 G: 10 GEL TOPICAL at 21:50

## 2024-04-12 RX ADMIN — ACETAMINOPHEN 650 MG: 325 TABLET ORAL at 21:51

## 2024-04-12 RX ADMIN — Medication 2 UNITS: at 12:01

## 2024-04-12 RX ADMIN — PANTOPRAZOLE SODIUM 40 MG: 40 TABLET, DELAYED RELEASE ORAL at 05:36

## 2024-04-12 RX ADMIN — INSULIN LISPRO 5 UNITS: 100 INJECTION, SOLUTION INTRAVENOUS; SUBCUTANEOUS at 17:15

## 2024-04-12 RX ADMIN — POTASSIUM CHLORIDE 20 MEQ: 1500 TABLET, EXTENDED RELEASE ORAL at 09:45

## 2024-04-12 RX ADMIN — HYDROCHLOROTHIAZIDE 25 MG: 25 TABLET ORAL at 07:57

## 2024-04-12 RX ADMIN — GABAPENTIN 100 MG: 100 CAPSULE ORAL at 21:51

## 2024-04-12 ASSESSMENT — PAIN DESCRIPTION - DESCRIPTORS
DESCRIPTORS: ACHING
DESCRIPTORS: ACHING

## 2024-04-12 ASSESSMENT — PAIN DESCRIPTION - LOCATION
LOCATION: GENERALIZED
LOCATION: ANKLE;LEG;KNEE

## 2024-04-12 ASSESSMENT — PAIN SCALES - GENERAL
PAINLEVEL_OUTOF10: 5
PAINLEVEL_OUTOF10: 3
PAINLEVEL_OUTOF10: 6
PAINLEVEL_OUTOF10: 3

## 2024-04-13 LAB
ANION GAP SERPL CALC-SCNC: 6 MMOL/L (ref 5–15)
BASOPHILS # BLD: 0 K/UL (ref 0–0.1)
BASOPHILS NFR BLD: 0 % (ref 0–1)
BUN SERPL-MCNC: 21 MG/DL (ref 6–20)
BUN/CREAT SERPL: 29 (ref 12–20)
CALCIUM SERPL-MCNC: 7.6 MG/DL (ref 8.5–10.1)
CHLORIDE SERPL-SCNC: 108 MMOL/L (ref 97–108)
CO2 SERPL-SCNC: 26 MMOL/L (ref 21–32)
CREAT SERPL-MCNC: 0.72 MG/DL (ref 0.55–1.02)
DIFFERENTIAL METHOD BLD: ABNORMAL
EOSINOPHIL # BLD: 0 K/UL (ref 0–0.4)
EOSINOPHIL NFR BLD: 0 % (ref 0–7)
ERYTHROCYTE [DISTWIDTH] IN BLOOD BY AUTOMATED COUNT: 12.5 % (ref 11.5–14.5)
GLUCOSE BLD STRIP.AUTO-MCNC: 236 MG/DL (ref 65–117)
GLUCOSE BLD STRIP.AUTO-MCNC: 245 MG/DL (ref 65–117)
GLUCOSE BLD STRIP.AUTO-MCNC: 261 MG/DL (ref 65–117)
GLUCOSE BLD STRIP.AUTO-MCNC: 292 MG/DL (ref 65–117)
GLUCOSE BLD STRIP.AUTO-MCNC: 296 MG/DL (ref 65–117)
GLUCOSE SERPL-MCNC: 301 MG/DL (ref 65–100)
HCT VFR BLD AUTO: 40.4 % (ref 35–47)
HGB BLD-MCNC: 13.9 G/DL (ref 11.5–16)
IMM GRANULOCYTES # BLD AUTO: 0.1 K/UL (ref 0–0.04)
IMM GRANULOCYTES NFR BLD AUTO: 1 % (ref 0–0.5)
LYMPHOCYTES # BLD: 0.9 K/UL (ref 0.8–3.5)
LYMPHOCYTES NFR BLD: 16 % (ref 12–49)
MAGNESIUM SERPL-MCNC: 1.6 MG/DL (ref 1.6–2.4)
MCH RBC QN AUTO: 34.3 PG (ref 26–34)
MCHC RBC AUTO-ENTMCNC: 34.4 G/DL (ref 30–36.5)
MCV RBC AUTO: 99.8 FL (ref 80–99)
MONOCYTES # BLD: 0.3 K/UL (ref 0–1)
MONOCYTES NFR BLD: 6 % (ref 5–13)
NEUTS SEG # BLD: 4.1 K/UL (ref 1.8–8)
NEUTS SEG NFR BLD: 77 % (ref 32–75)
NRBC # BLD: 0.02 K/UL (ref 0–0.01)
NRBC BLD-RTO: 0.4 PER 100 WBC
PLATELET # BLD AUTO: 195 K/UL (ref 150–400)
PMV BLD AUTO: 9.7 FL (ref 8.9–12.9)
POTASSIUM SERPL-SCNC: 2.8 MMOL/L (ref 3.5–5.1)
RBC # BLD AUTO: 4.05 M/UL (ref 3.8–5.2)
SERVICE CMNT-IMP: ABNORMAL
SODIUM SERPL-SCNC: 140 MMOL/L (ref 136–145)
WBC # BLD AUTO: 5.4 K/UL (ref 3.6–11)

## 2024-04-13 PROCEDURE — 83735 ASSAY OF MAGNESIUM: CPT

## 2024-04-13 PROCEDURE — 80048 BASIC METABOLIC PNL TOTAL CA: CPT

## 2024-04-13 PROCEDURE — 6370000000 HC RX 637 (ALT 250 FOR IP): Performed by: STUDENT IN AN ORGANIZED HEALTH CARE EDUCATION/TRAINING PROGRAM

## 2024-04-13 PROCEDURE — 6370000000 HC RX 637 (ALT 250 FOR IP): Performed by: NURSE PRACTITIONER

## 2024-04-13 PROCEDURE — 85025 COMPLETE CBC W/AUTO DIFF WBC: CPT

## 2024-04-13 PROCEDURE — 6360000002 HC RX W HCPCS: Performed by: NURSE PRACTITIONER

## 2024-04-13 PROCEDURE — 36415 COLL VENOUS BLD VENIPUNCTURE: CPT

## 2024-04-13 PROCEDURE — 2580000003 HC RX 258: Performed by: STUDENT IN AN ORGANIZED HEALTH CARE EDUCATION/TRAINING PROGRAM

## 2024-04-13 PROCEDURE — 1100000003 HC PRIVATE W/ TELEMETRY

## 2024-04-13 PROCEDURE — 6360000002 HC RX W HCPCS: Performed by: STUDENT IN AN ORGANIZED HEALTH CARE EDUCATION/TRAINING PROGRAM

## 2024-04-13 PROCEDURE — 82962 GLUCOSE BLOOD TEST: CPT

## 2024-04-13 RX ORDER — POTASSIUM CHLORIDE 7.45 MG/ML
10 INJECTION INTRAVENOUS
Status: DISCONTINUED | OUTPATIENT
Start: 2024-04-13 | End: 2024-04-13 | Stop reason: SDUPTHER

## 2024-04-13 RX ORDER — POTASSIUM CHLORIDE 7.45 MG/ML
10 INJECTION INTRAVENOUS ONCE
Status: COMPLETED | OUTPATIENT
Start: 2024-04-13 | End: 2024-04-13

## 2024-04-13 RX ORDER — POTASSIUM CHLORIDE 20 MEQ/1
40 TABLET, EXTENDED RELEASE ORAL EVERY 6 HOURS
Status: COMPLETED | OUTPATIENT
Start: 2024-04-13 | End: 2024-04-13

## 2024-04-13 RX ADMIN — POTASSIUM CHLORIDE 40 MEQ: 1500 TABLET, EXTENDED RELEASE ORAL at 15:02

## 2024-04-13 RX ADMIN — GABAPENTIN 100 MG: 100 CAPSULE ORAL at 22:02

## 2024-04-13 RX ADMIN — GABAPENTIN 100 MG: 100 CAPSULE ORAL at 15:09

## 2024-04-13 RX ADMIN — SODIUM CHLORIDE, PRESERVATIVE FREE 10 ML: 5 INJECTION INTRAVENOUS at 22:02

## 2024-04-13 RX ADMIN — DICLOFENAC SODIUM 2 G: 10 GEL TOPICAL at 10:54

## 2024-04-13 RX ADMIN — MONTELUKAST 10 MG: 10 TABLET, FILM COATED ORAL at 22:02

## 2024-04-13 RX ADMIN — DICLOFENAC SODIUM 2 G: 10 GEL TOPICAL at 13:08

## 2024-04-13 RX ADMIN — INSULIN LISPRO 5 UNITS: 100 INJECTION, SOLUTION INTRAVENOUS; SUBCUTANEOUS at 17:31

## 2024-04-13 RX ADMIN — CEFDINIR 300 MG: 300 CAPSULE ORAL at 22:02

## 2024-04-13 RX ADMIN — HYDROCORTISONE SODIUM SUCCINATE 100 MG: 100 INJECTION, POWDER, FOR SOLUTION INTRAMUSCULAR; INTRAVENOUS at 00:18

## 2024-04-13 RX ADMIN — INSULIN LISPRO 5 UNITS: 100 INJECTION, SOLUTION INTRAVENOUS; SUBCUTANEOUS at 10:54

## 2024-04-13 RX ADMIN — METRONIDAZOLE 500 MG: 500 INJECTION, SOLUTION INTRAVENOUS at 11:02

## 2024-04-13 RX ADMIN — SODIUM CHLORIDE, PRESERVATIVE FREE 10 ML: 5 INJECTION INTRAVENOUS at 10:53

## 2024-04-13 RX ADMIN — INSULIN LISPRO 5 UNITS: 100 INJECTION, SOLUTION INTRAVENOUS; SUBCUTANEOUS at 00:18

## 2024-04-13 RX ADMIN — PRIMIDONE 50 MG: 50 TABLET ORAL at 22:11

## 2024-04-13 RX ADMIN — ESCITALOPRAM OXALATE 10 MG: 10 TABLET ORAL at 10:52

## 2024-04-13 RX ADMIN — Medication 4 UNITS: at 15:09

## 2024-04-13 RX ADMIN — LISINOPRIL 10 MG: 5 TABLET ORAL at 10:51

## 2024-04-13 RX ADMIN — METRONIDAZOLE 500 MG: 500 INJECTION, SOLUTION INTRAVENOUS at 00:19

## 2024-04-13 RX ADMIN — HYDROCORTISONE SODIUM SUCCINATE 100 MG: 100 INJECTION, POWDER, FOR SOLUTION INTRAMUSCULAR; INTRAVENOUS at 10:51

## 2024-04-13 RX ADMIN — Medication 2 UNITS: at 17:31

## 2024-04-13 RX ADMIN — METRONIDAZOLE 500 MG: 500 INJECTION, SOLUTION INTRAVENOUS at 16:40

## 2024-04-13 RX ADMIN — PANTOPRAZOLE SODIUM 40 MG: 40 TABLET, DELAYED RELEASE ORAL at 11:18

## 2024-04-13 RX ADMIN — POTASSIUM CHLORIDE 10 MEQ: 7.46 INJECTION, SOLUTION INTRAVENOUS at 17:35

## 2024-04-13 RX ADMIN — CEFDINIR 300 MG: 300 CAPSULE ORAL at 10:52

## 2024-04-13 RX ADMIN — POTASSIUM CHLORIDE 10 MEQ: 7.46 INJECTION, SOLUTION INTRAVENOUS at 15:06

## 2024-04-13 RX ADMIN — CARVEDILOL 12.5 MG: 12.5 TABLET, FILM COATED ORAL at 22:02

## 2024-04-13 RX ADMIN — DICLOFENAC SODIUM 2 G: 10 GEL TOPICAL at 16:38

## 2024-04-13 RX ADMIN — HYDROCHLOROTHIAZIDE 25 MG: 25 TABLET ORAL at 10:52

## 2024-04-13 RX ADMIN — PRIMIDONE 50 MG: 50 TABLET ORAL at 10:51

## 2024-04-13 RX ADMIN — GABAPENTIN 100 MG: 100 CAPSULE ORAL at 10:51

## 2024-04-13 RX ADMIN — DICLOFENAC SODIUM 2 G: 10 GEL TOPICAL at 22:06

## 2024-04-13 RX ADMIN — ENOXAPARIN SODIUM 40 MG: 100 INJECTION SUBCUTANEOUS at 10:51

## 2024-04-13 RX ADMIN — CARVEDILOL 12.5 MG: 12.5 TABLET, FILM COATED ORAL at 10:52

## 2024-04-13 RX ADMIN — INSULIN GLARGINE 30 UNITS: 100 INJECTION, SOLUTION SUBCUTANEOUS at 10:53

## 2024-04-13 RX ADMIN — Medication 2 UNITS: at 10:53

## 2024-04-13 RX ADMIN — POTASSIUM CHLORIDE 40 MEQ: 1500 TABLET, EXTENDED RELEASE ORAL at 22:02

## 2024-04-13 ASSESSMENT — PAIN SCALES - GENERAL
PAINLEVEL_OUTOF10: 0

## 2024-04-14 LAB
ANION GAP SERPL CALC-SCNC: 2 MMOL/L (ref 5–15)
BUN SERPL-MCNC: 19 MG/DL (ref 6–20)
BUN/CREAT SERPL: 37 (ref 12–20)
CALCIUM SERPL-MCNC: 8 MG/DL (ref 8.5–10.1)
CHLORIDE SERPL-SCNC: 109 MMOL/L (ref 97–108)
CO2 SERPL-SCNC: 30 MMOL/L (ref 21–32)
CREAT SERPL-MCNC: 0.51 MG/DL (ref 0.55–1.02)
GLUCOSE BLD STRIP.AUTO-MCNC: 164 MG/DL (ref 65–117)
GLUCOSE BLD STRIP.AUTO-MCNC: 182 MG/DL (ref 65–117)
GLUCOSE BLD STRIP.AUTO-MCNC: 286 MG/DL (ref 65–117)
GLUCOSE BLD STRIP.AUTO-MCNC: 288 MG/DL (ref 65–117)
GLUCOSE BLD STRIP.AUTO-MCNC: 330 MG/DL (ref 65–117)
GLUCOSE SERPL-MCNC: 210 MG/DL (ref 65–100)
MAGNESIUM SERPL-MCNC: 1.6 MG/DL (ref 1.6–2.4)
POTASSIUM SERPL-SCNC: 3.5 MMOL/L (ref 3.5–5.1)
SERVICE CMNT-IMP: ABNORMAL
SODIUM SERPL-SCNC: 141 MMOL/L (ref 136–145)

## 2024-04-14 PROCEDURE — 6370000000 HC RX 637 (ALT 250 FOR IP): Performed by: NURSE PRACTITIONER

## 2024-04-14 PROCEDURE — 36415 COLL VENOUS BLD VENIPUNCTURE: CPT

## 2024-04-14 PROCEDURE — 80048 BASIC METABOLIC PNL TOTAL CA: CPT

## 2024-04-14 PROCEDURE — 82962 GLUCOSE BLOOD TEST: CPT

## 2024-04-14 PROCEDURE — 6370000000 HC RX 637 (ALT 250 FOR IP): Performed by: STUDENT IN AN ORGANIZED HEALTH CARE EDUCATION/TRAINING PROGRAM

## 2024-04-14 PROCEDURE — 6360000002 HC RX W HCPCS: Performed by: STUDENT IN AN ORGANIZED HEALTH CARE EDUCATION/TRAINING PROGRAM

## 2024-04-14 PROCEDURE — 2580000003 HC RX 258: Performed by: STUDENT IN AN ORGANIZED HEALTH CARE EDUCATION/TRAINING PROGRAM

## 2024-04-14 PROCEDURE — 1100000003 HC PRIVATE W/ TELEMETRY

## 2024-04-14 PROCEDURE — 83735 ASSAY OF MAGNESIUM: CPT

## 2024-04-14 RX ORDER — CEFDINIR 300 MG/1
300 CAPSULE ORAL EVERY 12 HOURS SCHEDULED
Qty: 6 CAPSULE | Refills: 0 | Status: SHIPPED | OUTPATIENT
Start: 2024-04-15 | End: 2024-04-18

## 2024-04-14 RX ORDER — METRONIDAZOLE 500 MG/1
500 TABLET ORAL EVERY 8 HOURS SCHEDULED
Qty: 3 TABLET | Refills: 0 | Status: SHIPPED | OUTPATIENT
Start: 2024-04-14 | End: 2024-04-15

## 2024-04-14 RX ORDER — METRONIDAZOLE 250 MG/1
500 TABLET ORAL EVERY 8 HOURS SCHEDULED
Status: DISCONTINUED | OUTPATIENT
Start: 2024-04-14 | End: 2024-04-15 | Stop reason: HOSPADM

## 2024-04-14 RX ADMIN — SODIUM CHLORIDE, PRESERVATIVE FREE 10 ML: 5 INJECTION INTRAVENOUS at 22:25

## 2024-04-14 RX ADMIN — INSULIN LISPRO 5 UNITS: 100 INJECTION, SOLUTION INTRAVENOUS; SUBCUTANEOUS at 17:36

## 2024-04-14 RX ADMIN — DICLOFENAC SODIUM 2 G: 10 GEL TOPICAL at 14:23

## 2024-04-14 RX ADMIN — GABAPENTIN 100 MG: 100 CAPSULE ORAL at 14:23

## 2024-04-14 RX ADMIN — METRONIDAZOLE 500 MG: 250 TABLET ORAL at 22:23

## 2024-04-14 RX ADMIN — ESCITALOPRAM OXALATE 10 MG: 10 TABLET ORAL at 08:35

## 2024-04-14 RX ADMIN — PREDNISONE 20 MG: 20 TABLET ORAL at 08:35

## 2024-04-14 RX ADMIN — ENOXAPARIN SODIUM 40 MG: 100 INJECTION SUBCUTANEOUS at 08:35

## 2024-04-14 RX ADMIN — CARVEDILOL 12.5 MG: 12.5 TABLET, FILM COATED ORAL at 08:35

## 2024-04-14 RX ADMIN — SODIUM CHLORIDE, PRESERVATIVE FREE 10 ML: 5 INJECTION INTRAVENOUS at 08:37

## 2024-04-14 RX ADMIN — METRONIDAZOLE 500 MG: 500 INJECTION, SOLUTION INTRAVENOUS at 08:51

## 2024-04-14 RX ADMIN — DICLOFENAC SODIUM 2 G: 10 GEL TOPICAL at 08:36

## 2024-04-14 RX ADMIN — CEFDINIR 300 MG: 300 CAPSULE ORAL at 08:35

## 2024-04-14 RX ADMIN — METRONIDAZOLE 500 MG: 500 INJECTION, SOLUTION INTRAVENOUS at 00:40

## 2024-04-14 RX ADMIN — MONTELUKAST 10 MG: 10 TABLET, FILM COATED ORAL at 22:24

## 2024-04-14 RX ADMIN — CARVEDILOL 12.5 MG: 12.5 TABLET, FILM COATED ORAL at 22:25

## 2024-04-14 RX ADMIN — Medication 6 UNITS: at 17:36

## 2024-04-14 RX ADMIN — CEFDINIR 300 MG: 300 CAPSULE ORAL at 22:23

## 2024-04-14 RX ADMIN — PRIMIDONE 50 MG: 50 TABLET ORAL at 22:24

## 2024-04-14 RX ADMIN — METRONIDAZOLE 500 MG: 250 TABLET ORAL at 17:36

## 2024-04-14 RX ADMIN — INSULIN LISPRO 5 UNITS: 100 INJECTION, SOLUTION INTRAVENOUS; SUBCUTANEOUS at 00:33

## 2024-04-14 RX ADMIN — INSULIN GLARGINE 30 UNITS: 100 INJECTION, SOLUTION SUBCUTANEOUS at 08:35

## 2024-04-14 RX ADMIN — PANTOPRAZOLE SODIUM 40 MG: 40 TABLET, DELAYED RELEASE ORAL at 06:40

## 2024-04-14 RX ADMIN — PRIMIDONE 50 MG: 50 TABLET ORAL at 08:36

## 2024-04-14 RX ADMIN — DICLOFENAC SODIUM 2 G: 10 GEL TOPICAL at 22:25

## 2024-04-14 RX ADMIN — GABAPENTIN 100 MG: 100 CAPSULE ORAL at 22:24

## 2024-04-14 RX ADMIN — LISINOPRIL 10 MG: 5 TABLET ORAL at 08:36

## 2024-04-14 RX ADMIN — HYDROCHLOROTHIAZIDE 25 MG: 25 TABLET ORAL at 08:36

## 2024-04-14 RX ADMIN — DICLOFENAC SODIUM 2 G: 10 GEL TOPICAL at 17:36

## 2024-04-14 RX ADMIN — GABAPENTIN 100 MG: 100 CAPSULE ORAL at 08:35

## 2024-04-14 ASSESSMENT — PAIN SCALES - GENERAL
PAINLEVEL_OUTOF10: 0
PAINLEVEL_OUTOF10: 0

## 2024-04-14 NOTE — DISCHARGE SUMMARY
Addendum:  Patient will be officially discharge tomorrow AM (4/15), retirement unable to accept patient on weekend and needs to be at the retirement before 1100 on weekdays                                    Discharge Summary    Name: Siomara Collins  [id number]  YOB: 1945 (Age: 79 y.o.)   Date of Admission: 4/5/2024  Date of Discharge: 4/14/2024  Attending Physician: Trisha Brian MD    Discharge Diagnosis:     Severe sepsis due to bilateral pneumonia     Sigmoid colitis     UTI     Hypokalemia     Hypomagnesemia     Hyperlipidemia     Hypertension     Depression     Diabetes     Diabetic neuropathy     Chronic pain     PMR     Generalized weakness    Consultations:  IP CONSULT TO ORTHOPEDIC SURGERY  IP CONSULT TO PHARMACY  IP CONSULT TO CASE MANAGEMENT      Brief Admission History/Reason for Admission Per Kel Carrillo MD:     Siomara Collins is a 79 y.o. female with PMHx significant as below presented with cough and generalized weakness. She was tested positive for COVID 5 days back at the nursing home. Today she reports being very weak, also had some abdominal pain and loose bowel movements. She also has a cough without any phlegm. She also reports having some chills in the nursing home. She denies any chest pain, shortness of breath, change in urinary habits, lower extremity edema or tenderness.     Brief Hospital Course by Main Problems:     Severe sepsis due to bilateral pneumonia  Sigmoid colitis  UTI  CT of the abdomen showed   sigmoid descending colitis bilateral lower lobe alveolitis versus pneumonia, posterior fusion of T10-S1 with loosening of the T10 and S1 screws  Paired BC 4/5: staph species 1/2 bottles  Paired BC 4/9: NGTD  Lactic acid 2.5 improved after fluid resuscitation to 0.7  - s/p IV Doxycycline x 5 days  - to complete total 10 days of Flagyl   - Previously on Levaquin which was discontinued due to rash  - Respiratory panel positive for parainfluenza and COVID  - saturating well on RA  - Ortho

## 2024-04-15 VITALS
RESPIRATION RATE: 18 BRPM | TEMPERATURE: 98.1 F | OXYGEN SATURATION: 98 % | WEIGHT: 181.66 LBS | HEIGHT: 63 IN | DIASTOLIC BLOOD PRESSURE: 70 MMHG | SYSTOLIC BLOOD PRESSURE: 135 MMHG | BODY MASS INDEX: 32.19 KG/M2 | HEART RATE: 64 BPM

## 2024-04-15 LAB
BACTERIA SPEC CULT: NORMAL
BACTERIA SPEC CULT: NORMAL
GLUCOSE BLD STRIP.AUTO-MCNC: 154 MG/DL (ref 65–117)
GLUCOSE BLD STRIP.AUTO-MCNC: 251 MG/DL (ref 65–117)
SERVICE CMNT-IMP: ABNORMAL
SERVICE CMNT-IMP: ABNORMAL
SERVICE CMNT-IMP: NORMAL
SERVICE CMNT-IMP: NORMAL

## 2024-04-15 PROCEDURE — 6370000000 HC RX 637 (ALT 250 FOR IP): Performed by: NURSE PRACTITIONER

## 2024-04-15 PROCEDURE — 6360000002 HC RX W HCPCS: Performed by: STUDENT IN AN ORGANIZED HEALTH CARE EDUCATION/TRAINING PROGRAM

## 2024-04-15 PROCEDURE — 6370000000 HC RX 637 (ALT 250 FOR IP): Performed by: STUDENT IN AN ORGANIZED HEALTH CARE EDUCATION/TRAINING PROGRAM

## 2024-04-15 PROCEDURE — 82962 GLUCOSE BLOOD TEST: CPT

## 2024-04-15 RX ADMIN — INSULIN GLARGINE 30 UNITS: 100 INJECTION, SOLUTION SUBCUTANEOUS at 08:25

## 2024-04-15 RX ADMIN — LISINOPRIL 10 MG: 5 TABLET ORAL at 08:23

## 2024-04-15 RX ADMIN — INSULIN LISPRO 5 UNITS: 100 INJECTION, SOLUTION INTRAVENOUS; SUBCUTANEOUS at 00:41

## 2024-04-15 RX ADMIN — DICLOFENAC SODIUM 2 G: 10 GEL TOPICAL at 08:26

## 2024-04-15 RX ADMIN — PRIMIDONE 50 MG: 50 TABLET ORAL at 08:23

## 2024-04-15 RX ADMIN — PANTOPRAZOLE SODIUM 40 MG: 40 TABLET, DELAYED RELEASE ORAL at 07:26

## 2024-04-15 RX ADMIN — ESCITALOPRAM OXALATE 10 MG: 10 TABLET ORAL at 08:24

## 2024-04-15 RX ADMIN — GABAPENTIN 100 MG: 100 CAPSULE ORAL at 08:23

## 2024-04-15 RX ADMIN — METRONIDAZOLE 500 MG: 250 TABLET ORAL at 07:26

## 2024-04-15 RX ADMIN — HYDROCHLOROTHIAZIDE 25 MG: 25 TABLET ORAL at 08:23

## 2024-04-15 RX ADMIN — CARVEDILOL 12.5 MG: 12.5 TABLET, FILM COATED ORAL at 08:23

## 2024-04-15 RX ADMIN — CEFDINIR 300 MG: 300 CAPSULE ORAL at 08:23

## 2024-04-15 RX ADMIN — ENOXAPARIN SODIUM 40 MG: 100 INJECTION SUBCUTANEOUS at 08:24

## 2024-04-15 RX ADMIN — INSULIN LISPRO 5 UNITS: 100 INJECTION, SOLUTION INTRAVENOUS; SUBCUTANEOUS at 08:24

## 2024-04-15 RX ADMIN — PREDNISONE 20 MG: 20 TABLET ORAL at 08:23

## 2024-04-15 NOTE — PROGRESS NOTES
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  Paired BC : NGTD  Lactic acid 2.5 improved after fluid resuscitation to 0.7  - s/p IV Doxycycline, continue Flagyl   - Previously on Levaquin which was discontinued due to rash  - Respiratory panel positive for parainfluenza and COVID  - On room air sats 94 to 97%  - Ortho consult for CT findings of loosening of T10 and S1 screws, recommended outpatient follow-up  - switched Vancomycin to Cefdinir for additional 7 days as 2nd set of BC NGTD        Hypokalemia  Hypomagnesemia  - s/p repletion  - monitor and replete PRN     Hyperlipidemia  - diet controlled     Hypertension  - Continue carvedilol, hydrochlorothiazide, lisinopril     Depression  - Continue Lexapro     Diabetes  Diabetic neuropathy  - Continue Lantus and sliding scale  - 5 units insulin timed with Solu-Cortef administration  - Of blood sugars noted in the 200s to 300 likely related to steroid  - Continue gabapentin        Chronic pain  - Continue lidocaine patch and Voltaren     PMR  - Continue steroids   - Monitor glucose while on steroids          Medical Decision Making:   I personally reviewed labs: BMP, CBC  I personally reviewed imaging: none  I personally reviewed EKG: SR with PVC's  Toxic drug monitoring: K+  Discussed case with: attending, CM, PT/OT        Code Status: DNR  DVT Prophylaxis: Lovenox  GI Prophylaxis: Protonix    Subjective:     Chief Complaint / Reason for Physician Visit    Updated son on patient's progress, confirmed 
        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 screws  Paired BC : NGTD  Lactic acid 2.5 improved after fluid resuscitation to 0.7  - s/p IV Doxycycline, continue Flagyl   - Previously on Levaquin which was discontinued due to rash  - Respiratory panel positive for parainfluenza and COVID  - On room air sats 94 to 97%  - Ortho consult for CT findings of loosening of T10 and S1 screws, recommended outpatient follow-up  - switched Vancomycin to Cefdinir for additional 7 days as 2nd set of BC NGTD        Hypokalemia  Hypomagnesemia  - s/p repletion  - monitor and replete PRN     Hyperlipidemia  - diet controlled     Hypertension  - Continue carvedilol, hydrochlorothiazide, lisinopril     Depression  - Continue Lexapro     Diabetes  Diabetic neuropathy  - Continue Lantus and sliding scale  - 5 units insulin timed with Solu-Cortef administration  - Of blood sugars noted in the 200s to 300 likely related to steroid  - Continue gabapentin        Chronic pain  - Continue lidocaine patch and Voltaren     PMR  - Continue steroids   - Monitor glucose while on steroids           Medical Decision Making:   I personally reviewed labs: pending, BC  I personally reviewed imaging: none  I personally reviewed EKG: SR  Toxic drug monitoring: BG, K+  Discussed case with: attending        Code Status: DNR  DVT Prophylaxis: Lovenox  GI Prophylaxis: Protonix    Subjective:     Chief Complaint / Reason for Physician Visit    \"I feel better, I really would like to work with PT today\".      Patient denies fever, 
        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 screws  Paired BC : NGTD  Lactic acid 2.5 improved after fluid resuscitation to 0.7  - s/p IV Doxycycline, continue Flagyl   - Previously on Levaquin which was discontinued due to rash  - Respiratory panel positive for parainfluenza and COVID  - On room air sats 94 to 97%  - Ortho consult for CT findings of loosening of T10 and S1 screws, recommended outpatient follow-up  - switched Vancomycin to Cefdinir for additional 7 days as 2nd set of BC NGTD        Hypokalemia  Hypomagnesemia  - s/p repletion  - monitor and replete PRN     Hyperlipidemia  - diet controlled     Hypertension  - Continue carvedilol, hydrochlorothiazide, lisinopril     Depression  - Continue Lexapro     Diabetes  Diabetic neuropathy  - Continue Lantus and sliding scale  - 5 units insulin timed with Solu-Cortef administration  - Of blood sugars noted in the 200s to 300 likely related to steroid  - Continue gabapentin        Chronic pain  - Continue lidocaine patch and Voltaren     PMR  - Continue steroids   - Monitor glucose while on steroids        Medical Decision Making:   I personally reviewed labs: BMP,CBC, BC  I personally reviewed imaging: none  I personally reviewed EKG: SR  Toxic drug monitoring: Vanc  Discussed case with: attending, CM, pharmacist        Code Status: DNR  DVT Prophylaxis: Lovenox  GI Prophylaxis: Protonix    Subjective:     Chief Complaint / Reason for Physician Visit    K+ 2.8 this AM, PO repletion ordered. Repeat BC day 2 negative. 
      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    Hyperlipidemia  Hypertension  Continue carvedilol, hydrochlorothiazide, lisinopril  Monitor BP    Depression  Continue Lexapro    Diabetes  Diabetic neuropathy  Monitor glucose  Continue Lantus and sliding scale  5 units insulin timed with Solu-Cortef administration  Of blood sugars noted in the 200s to 300 likely related to steroid  Continue gabapentin      Chronic pain  Continue lidocaine patch and Voltaren    PMR  Continue steroids   Monitor glucose while on steroids        Medical Decision Making     [] High (any 2)     A. Problems (any 1)  [] Acute/Chronic Illness/injury posing threat to life or bodily function:    [] Severe exacerbation of chronic illness:    ---------------------------------------------------------------------  B. Risk of Treatment (any 1)   [] Drugs/treatments that require intensive monitoring for toxicity include:    [] IV ABX requiring serial renal monitoring for nephrotoxicity:     [] IV Narcotic analgesia for adverse drug reaction  [] Aggressive IV diuresis requiring 
      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 screws    Lactic acid 2.5 improved after fluid resuscitation to 0.7  Fever 100 noted on admission, afebrile last 24 hours  Continue doxycycline and Flagyl day 3  Previously on Levaquin which was discontinued due to rash  Respiratory panel positive for parainfluenza and COVID  On room air sats 94 to 97%  Ortho consult for CT findings of loosening of T10 and S1 screws, recommended outpatient follow-up  Blood culture grew gram-positive cocci in clusters, repeat blood culture sent, started on vancomycin, DC if repeat blood culture negative      Hypokalemia  Hypomagnesemia  2.7 today   -Repleted   -Labs in a.m.    Hyperlipidemia  Hypertension  Continue carvedilol, hydrochlorothiazide, lisinopril  Monitor BP    Depression  Continue Lexapro    Diabetes  Diabetic neuropathy  Monitor glucose  Continue Lantus and sliding scale  5 units insulin timed with Solu-Cortef administration  Of blood sugars noted in the 200s to 300 likely related to steroid  Continue gabapentin      Chronic pain  Continue lidocaine patch and Voltaren    PMR  Continue steroids   Monitor glucose while on steroids        Medical Decision Making     [] High (any 2)     A. Problems (any 1)  [] Acute/Chronic Illness/injury posing threat to life or bodily function:    [] Severe exacerbation of chronic illness:    ---------------------------------------------------------------------  B. Risk of Treatment (any 1)   [] Drugs/treatments that require intensive monitoring for toxicity 
      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    Hyperlipidemia  Hypertension  Continue carvedilol, hydrochlorothiazide, lisinopril  Monitor BP    Depression  Continue Lexapro    Diabetes  Diabetic neuropathy  Monitor glucose  Continue Lantus and sliding scale  5 units insulin timed with Solu-Cortef administration  Of blood sugars noted in the 200s to 300 likely related to steroid  Continue gabapentin      Chronic pain  Continue lidocaine patch and Voltaren    PMR  Continue steroids   Monitor glucose while on steroids        Medical Decision Making     [] High (any 2)     A. Problems (any 1)  [x] Acute/Chronic Illness/injury posing threat to life or bodily function:    [] Severe exacerbation of chronic illness:    ---------------------------------------------------------------------  B. Risk of Treatment (any 1)   [] Drugs/treatments that require intensive monitoring for toxicity include:    [x] IV ABX requiring serial renal monitoring for nephrotoxicity:     [] IV Narcotic analgesia for adverse drug reaction  [] Aggressive IV diuresis requiring 
.End of Shift Note    Bedside shift change report given to YUDELKA Nelson (oncoming nurse) by Luly Reyes RN (offgoing nurse).  Report included the following information SBAR, Intake/Output, MAR, and Recent Results    Shift worked:  7a-7p     Shift summary and any significant changes:     Blood cultures + 1 out of 2. Redraw sent. Patient has no complaints. 1 BM today.  Education provided      Concerns for physician to address:       Zone phone for oncoming shift:          Activity:     Number times ambulated in hallways past shift: 0  Number of times OOB to chair past shift: 0    Cardiac:   Cardiac Monitoring: Yes           Access:  Current line(s): PIV     Genitourinary:   Urinary status: voiding and external catheter    Respiratory:      Chronic home O2 use?: NO  Incentive spirometer at bedside: NO       GI:     Current diet:  ADULT DIET; Regular; Low Fat/Low Chol/High Fiber/HANNY  Passing flatus: YES  Tolerating current diet: YES       Pain Management:   Patient states pain is manageable on current regimen: YES    Skin:     Interventions: float heels, limit briefs, and internal/external urinary devices    Patient Safety:  Fall Score:    Interventions: bed/chair alarm, pt to call before getting OOB, and stay with me (per policy)       Length of Stay:  Expected LOS: 7  Actual LOS: 4      Luly Reyes RN                            
Bedside shift change report given to *** (oncoming nurse) by Barbara RN (offgoing nurse). Report included the following information Nurse Handoff Report.    
End of Shift Note    Bedside shift change report given to Brandy JHA  (oncoming nurse) by John Langford RN (offgoing nurse).  Report included the following information SBAR, Kardex, Intake/Output, and MAR    Shift worked:  7-7 pm      Shift summary and any significant changes:     Patient is alert oriented times 4, room air. Patient able to stand and pee in bedside commode today as per PT. Patient still weak and need more PT sessions. Patient medications given. Needs attended. Gvien prn pain medications and blood pressure prn med given.        John Langford RN                           
End of Shift Note    Bedside shift change report given to Kristy JHA, Barbara JHA (oncoming nurse) by John Langford RN (offgoing nurse).  Report included the following information SBAR, Kardex, Intake/Output, and MAR    Shift worked:  7 AM -7 pm      Shift summary and any significant changes:     Patient is alert oriented times, room air. Patient still on droplet plus isolation due to covid positive. Patient k was 2.4 and given potassium tablet 40 mg stat and will get every 6 hours and also given potassium iv 2 doses. Patient blood pressure spike up this afternon then ask doc for prn blood pressure med. Patient given hydralazine 10 mg as needed. Need attended. Continue monitoring blood sugar and insulin coverage due to pt taking high dose of prednisone.         John Langford RN                           
End of Shift Note    Bedside shift change report given to Terell JHA  (oncoming nurse) by Lisa Pearl RN (offgoing nurse).  Report included the following information SBAR, Kardex, Intake/Output, and MAR    Shift worked:  NIGHT   Shift summary and any significant changes:     No acute changes over the night. Scheduled meds given.  Patient received her antibiotics. No coverage for her blood sugar over the night.  She looks weak and unsteady. PT OT  on board.  All labs done.       Lisa Pearl RN                           
End of Shift Note    Bedside shift change report given to Terell JHA  (oncoming nurse) by Lisa Pearl RN (offgoing nurse).  Report included the following information SBAR, Kardex, Intake/Output, and MAR    Shift worked:  NIGHT   Shift summary and any significant changes:     No acute changes over the night. Scheduled meds given.  Patient received her antibiotics. No coverage for her blood sugar over the night.  She looks weak and unsteady. PT OT  on board.  All labs done.       Lisa Pearl RN                           
MD on call notified via perfect serve of patients blood glucose 366 this evening. Per MD ok for 4 units humalog insulin.   
Nursing contacted Nocturnist/cross cover provider and notified patient asking for PT/OT evaluation. No other concerns reported. VSS. Ordered pt/ot, asked nurse to please [facility] [place] with non-urgent matters for pt/ot in the future as is nearly 0700. Will defer further evaluation/management to the day shift primary care team. Patient denies any further complaints or concerns. No acute distress reported. Nursing to notify Hospitalist for further/continued concerns. Will remain available overnight for further concerns if nursing/patient needs.   Non-billable note.     
Nursing contacted Nocturnist/cross cover provider and notified patient bp 175/65. Asymptomatic. No other concerns reported. VSS. Appears on review of vitals this admit can get hypotensive at times, could also be 2/2 sepsis appears to be improving. Asked nurse to monitor, would consider prn if sbp > 185, defer for now. Patient denies any further complaints or concerns. No acute distress reported. Nursing to notify Hospitalist for further/continued concerns. Will remain available overnight for further concerns if nursing/patient needs. Will defer further evaluation/management to the day shift primary care team.    Update  0500 nurse reported k 2.9, potassium 40meq po x2 dose, kcl 10meq iv x 1 run. No acute distress reported. Vss. No other concerns reported.    Non-billable note.     
Orders received, chart reviewed and patient evaluated by occupational therapy. Pending progression with skilled acute occupational therapy, recommend:  Return to [facility] with HHOT and increased assist from staff    Recommend with nursing patient to complete as able in order to maintain strength, endurance and independence: OOB to chair 3x/day, ADLs with supervision/setup and mobilizing to the bathroom for toileting with 2 assist. Thank you for your assistance.     Full evaluation to follow.     
Patient arrived to the floor and had a rash around the iv line, levaquin was going through the iv.  Suspected rash d/t levaquin, will stop it, switch it to doxycyline  Also bp in 80s, improved after 250 ml bolus to 100s  Will switch prednisone to stress dose steroids  
Pharmacy Antimicrobial Kinetic Dosing    Indication for Antimicrobials: sepsis     Current Regimen of Each Antimicrobial:  Vancomycin - pharmacy dosing; Start Date ; Day # 1    Previous Antimicrobial Therapy:  NA     Goal Level: Vancomycin -600    Date Dose & Interval Measured (mcg/mL) Predicted AUC                       Significant Cultures:   PCR: staph  Bcx - NGTD  Repeat bcx - pending    Labs:  Recent Labs     Units 24  0800 24  1234 24  0341   CREATININE MG/DL 0.67 0.68 0.60   BUN MG/DL 19 16 18   WBC K/uL 5.7  --   --      Temp (24hrs), Av.1 °F (36.7 °C), Min:97.9 °F (36.6 °C), Max:98.2 °F (36.8 °C)      Conditions for Dosing Consideration: None    Creatinine Clearance (mL/min): Estimated Creatinine Clearance: 71 mL/min (based on SCr of 0.67 mg/dL).       Impression/Plan:   Vancomycin 2000 mg IV once then 1000 mg IV Q12H,   BMP daily  Per Dr. Damián DC if repeat bcx show no growth  Antimicrobial stop date 7 days     Pharmacy will follow daily and adjust medications as appropriate for renal function and/or serum levels.    Thank you,  Blake Nowak, [facility]    
Pharmacy Medication Reconciliation     The patient was NOT interviewed regarding current PTA medication list, use and drug allergies;  [facility]    Allergy Update: Metformin, Statins, Propoxyphene, Codeine, Penicillins, and Sulfa antibiotics    Recommendations/Findings:   The following amendments were made to the patient's active medication list on file at [facility]:   Prospective    Clarified PTA med list with /snf. PTA medication list was corrected to the following:     Prior to Admission Medications   Prescriptions Last Dose Informant   acetaminophen (TYLENOL) 325 MG tablet  at ... [facility]   Sig: Take 2 tablets by mouth every 4 hours as needed for Pain   balsum peru-castor oil (VENELEX) OINT ointment 4/4/2024 at 2100 [facility]   Sig: Apply topically 2 times daily   carvedilol (COREG) 12.5 MG tablet 4/4/2024 at 1800 [facility]   Sig: Take 1 tablet by mouth 2 times daily   diclofenac sodium (VOLTAREN) 1 % GEL 4/4/2024 [facility]   Sig: Apply 2 g topically in the morning, at noon, in the evening, and at bedtime   escitalopram (LEXAPRO) 10 MG tablet 4/4/2024 at 0900 [facility]   Sig: Take 1 tablet by mouth daily   gabapentin (NEURONTIN) 100 MG capsule 4/4/2024 at .. [facility]   Sig: Take 1 capsule by mouth 3 times daily.   hydroCHLOROthiazide (HYDRODIURIL) 25 MG tablet 4/4/2024 at 0900 [facility]   Sig: Take 1 tablet by mouth daily   insulin glargine (LANTUS) 100 UNIT/ML injection vial 4/4/2024 at ... [facility]   Sig: Inject 30 Units into the skin daily   insulin lispro (HUMALOG) 100 UNIT/ML SOLN injection vial 4/4/2024 [facility]   Sig: Inject into the skin 3 times daily (before meals) Sliding scale insulin  151-200= 2 units  201-250=4 units  251-300=6 units  301-350= 8 units  351-400=10 units  >400 units=12 units   lidocaine (HM LIDOCAINE PATCH) 4 % external patch 4/4/2024 at .... [facility]   Sig: Place 1 patch 
Pt noted to have a PIV inserted in the LAC. The PIV had good blood return and flushed well, no s/s of infiltration nor swelling. Pt noted to c/o pain when IV ABT was infusing and requested to have IV removed. Oncoming nurse made aware to notify picc team. Pt in no distress.  
Report given to John JHA. Updated on SBAR and meds. Patient had prn Hydralazine x2 on night shift. BP wnl this AM.  
Report given to YUDELKA Moreno at [facility].  
Spiritual Care Assessment/Progress Note  [facility]    Name: Siomara Collins MRN: [id number]    Age: 79 y.o.     Sex: female   Language: English     Date: 4/14/2024            Total Time Calculated: 16 min              Spiritual Assessment begun in [facility] CLINICAL OBS  Service Provided For:: Patient  Referral/Consult From:: Rounding  Encounter Overview/Reason : Initial Encounter    Spiritual beliefs:      [x] Involved in a linda tradition/spiritual practice:      [] Supported by a linda community:      [] Claims no spiritual orientation:      [] Seeking spiritual identity:           [] Adheres to an individual form of spirituality:      [] Not able to assess:                Identified resources for coping and support system:   Support System: Family members       [x] Prayer                  [] Devotional reading               [] Music                  [] Guided Imagery     [] Pet visits                                        [] Other: (COMMENT)     Specific area/focus of visit   Encounter:    Crisis:    Spiritual/Emotional needs: Type: Spiritual Support  Ritual, Rites and Sacraments:    Grief, Loss, and Adjustments: Type: Life Adjustments, Adjustment to illness  Ethics/Mediation:    Behavioral Health:    Palliative Care:    Advance Care Planning:      Plan/Referrals: No future visits requested    Narrative:   Patient was on contact precaution in 1140 and so  first consulted with nurse and learned patient was able to hold phone conversation. She received call warmly and engaged in conversation. She shared what led to hospitalization and stated that she initially experienced much discomfort but feels better now, she has received tremendous support from family, friends and staff. No need requested when  inquired. Progress celebrated, assurance of prayer offered. Patient was grateful for the call.     Visited by: Chaplain Cachorro Boyle M.Div., [facility].   Paging Service: [phone] 
current orders and MAR    YES  PMH/SH reviewed - no change compared to H&P    Procedures: see electronic medical records for all procedures/Xrays and details which were not copied into this note but were reviewed prior to creation of Plan.      LABS:  I reviewed today's most current labs and imaging studies.  Pertinent labs include:  Recent Labs     04/09/24  0800   WBC 5.7   HGB 14.2   HCT 40.2        Recent Labs     04/08/24  0341 04/08/24  1234 04/09/24  0800    142 141   K 2.7* 3.4* 3.5    105 105   CO2 26 32 33*   GLUCOSE 240* 280* 222*   BUN 18 16 19   CREATININE 0.60 0.68 0.67   CALCIUM 8.3* 7.8* 8.7   MG 1.4*  --   --    LABALBU 2.7*  --   --    BILITOT 0.4  --   --    AST 23  --   --    ALT 28  --   --        Signed: ROLANDO Lehman - DEMETRI

## 2024-04-15 NOTE — CARE COORDINATION
Pt is clear from CM standpoint for d/c.     [facility] to transport at 830 am.     Report number to [facility] is [phone].     Transition of Care Plan:     RUR: 25%  Prior Level of Functioning: Assistance   Disposition: [facility] with Home health services  If SNF or IPR: Date FOC offered:   Date FOC received:   Accepting facility:   Date authorization started with reference number:   Date authorization received and expires:   Follow up appointments: Defer to assisted living   DME needed: No DME needed   Transportation at discharge: [facility] to transport at 830 am  IM/IMM Medicare/ letter given: 4/15/24  Is patient a [place] and connected with VA? No              If yes, was [place] transfer form completed and VA notified? No  Caregiver Contact: Pt's son   Discharge Caregiver contacted prior to discharge? Pt and the [facility] was contacted   Care Conference needed? No  Barriers to discharge: None           04/15/24 0754   Services At/After Discharge   Transition of Care Consult (CM Consult) Assisted Living;Home Health   Services At/After Discharge Home Health   [place] Resource Information Provided? No   Mode of Transport at Discharge BLS   Confirm Follow Up Transport Self   Condition of Participation: Discharge Planning   The Plan for Transition of Care is related to the following treatment goals: Goal is to return to the [facility] with home health services   The Patient and/or Patient Representative was provided with a Choice of Provider? Patient   The Patient and/Or Patient Representative agree with the Discharge Plan? Yes   Freedom of Choice list was provided with basic dialogue that supports the patient's individualized plan of care/goals, treatment preferences, and shares the quality data associated with the providers?  Yes     Yary Scott      
2:19 PM  CM attempted to meet with pt at bedside, pt is transitioning to inpatient room 1140. CM placed call to son/mPOA Jimi Verdin p: [phone], no answer, VM left requesting return call.    12:33 PM  CM acknowledges admission. SANCHO updated Howard with [facility] to cancel hospice admission for this afternoon. Will plan for assessment with pt and pt's son, Jimi, to begin transition of care plan discussions.     Initial note:  SANCHO received incoming call from Howard with [facility] group [phone] who shares that pt is slated to be admitted with [facility] this afternoon, and that pt resides at [facility]. Per Howard, pt discharged yesterday from [facility] back to [facility]. [facility] confirms that they are able to assist with transition back to [place] with hospice support on today if this is pt's wishes. SANCHO to communicate this information with ED MD.        TARAH Azar.  Care Manager, [facility]  [phone]/Available on [facility] Serve     
Transition of Care Plan:     RUR: 21%  Prior Level of Functioning: Assistance   [facility]position[facility]ospice services[facility]  If SNF or IPR: Date FOC offered:   Date FOC received:   Accepting facility:   Date authorization started with reference number:   Date authorization received and expires:   Follow up appointments: Defer to assisted living   DME needed: No DME needed   Transportation at discharge: [facility] to transport at 830 am  IM/IMM Medicare/ letter given: 4/9/24  Is patient a  and connected with VA? No              If yes, was  transfer form completed and VA notified? No  Caregiver Contact: Pt's son   Discharge Caregiver contacted prior to discharge? Pt and the [facility]  Care Conference needed? No  Barriers to discharge: The [facility] would not accept pt after 11 am and Medical clearance     CM reviewed pt's chart and pt is not medically stable due to waiting on cultures.    CM will continue to follow and assist with d/c planning.    Yary Scott      
Transition of Care Plan:     RUR: 22%  Prior Level of Functioning: Assistance   Disposition: [facility] with Hospice services-[facility]  If SNF or IPR: Date FOC offered:   Date FOC received:   Accepting facility:   Date authorization started with reference number:   Date authorization received and expires:   Follow up appointments: Defer to assisted living   DME needed: No DME needed   Transportation at discharge: [facility] to transport at 830 am  IM/IMM Medicare/ letter given: 4/9/24  Is patient a  and connected with VA? No              If yes, was  transfer form completed and VA notified? No  Caregiver Contact: Pt's son   Discharge Caregiver contacted prior to discharge? Pt and the [facility]  Care Conference needed? No  Barriers to discharge: [facility] after 11 am     CM is aware that pt is medically stable for d/c but [facility].    [facility] has been out to eval and they can accept pt on Monday.     has arranged for transportation for Monday at 830 due to pt has to be back to the facility before 11 am.    [facility] does not accept over the weekend.    CM will continue to follow and assist with d/c planning.    Yary Scott      
Plan? Yes   Freedom of Choice list was provided with basic dialogue that supports the patient's individualized plan of care/goals, treatment preferences, and shares the quality data associated with the providers?  Yes     Yary Scott      
of care/goals, treatment preferences, and shares the quality data associated with the providers?  Yes         Advance Care Planning     General Advance Care Planning (ACP) Conversation    Date of Conversation: 4/5/2024  Conducted with:  Healthcare Decision Maker: Named in Advance Directive or Healthcare Power of  (name) oswaldo Simon    Healthcare Decision Maker:    Primary Decision Maker: Jimi Verdin - Child - [phone]  Click here to complete Healthcare Decision Makers including selection of the Healthcare Decision Maker Relationship (ie \"Primary\").   Today we documented Decision Maker(s) consistent with ACP documents on file.    Content/Action Overview:  Has ACP document(s) on file - reflects the patient's care preferences  Reviewed DNR/DNI and patient confirms current DNR status - completed forms on file (place new order if needed)      Length of Voluntary ACP Conversation in minutes:  <16 minutes (Non-Billable)    TARAH Rolon.  Care Manager, [facility]  [phone]/Available on [facility] Serve

## 2024-04-15 NOTE — DISCHARGE SUMMARY
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  female with PMHx significant as below presented with cough and generalized weakness.  She was tested positive for COVID 5 days back at the nursing home.  Today she reports being very weak, also had some abdominal pain and loose bowel movements.  She also has a cough without any phlegm.  She also reports having some chills in the nursing home.  She denies any chest pain, shortness of breath, change in urinary habits, lower extremity edema or tenderness We were asked to admit for work up and evaluation of the above problems.        Brief Hospital Course by Main Problems:   Severe sepsis due to bilateral pneumonia  (+) COVID and parainfluenza via Resp PCR  Sigmoid colitis  UTI  Paired BC 4/5: staph species 1/2 bottles  Paired BC 4/9: NGTD  CT A/P sigmoid descending colitis bilateral lower lobe alveolitis versus pneumonia, posterior fusion of T10-S1 with loosening of the T10 and S1 screws  Lactic acid 2.5 improved after fluid resuscitation to 0.7  - s/p IV Doxycycline x 5 days  - to complete total 10 days of Flagyl (1 day remaining at DC)  - Previously on Levaquin which was discontinued due to rash  - Respiratory panel positive for

## 2024-04-15 NOTE — PLAN OF CARE
Problem: Chronic Conditions and Co-morbidities  Goal: Patient's chronic conditions and co-morbidity symptoms are monitored and maintained or improved  Outcome: Progressing  Flowsheets (Taken 4/5/2024 1445 by Cher Bowen RN)  Care Plan - Patient's Chronic Conditions and Co-Morbidity Symptoms are Monitored and Maintained or Improved: Monitor and assess patient's chronic conditions and comorbid symptoms for stability, deterioration, or improvement     Problem: Pain  Goal: Verbalizes/displays adequate comfort level or baseline comfort level  Outcome: Progressing  Flowsheets (Taken 4/5/2024 1445 by Cher Bowen RN)  Verbalizes/displays adequate comfort level or baseline comfort level:   Encourage patient to monitor pain and request assistance   Assess pain using appropriate pain scale   Administer analgesics based on type and severity of pain and evaluate response     Problem: Discharge Planning  Goal: Discharge to home or other facility with appropriate resources  Recent Flowsheet Documentation  Taken 4/5/2024 1445 by Cher Bowen RN  Discharge to home or other facility with appropriate resources:   Identify barriers to discharge with patient and caregiver   Arrange for needed discharge resources and transportation as appropriate   Identify discharge learning needs (meds, wound care, etc)     
  Problem: Discharge Planning  Goal: Discharge to home or other facility with appropriate resources  4/14/2024 1010 by Gume Cristobal RN  Outcome: Progressing  4/14/2024 0125 by Nakul Pearl RN  Outcome: Progressing     Problem: Pain  Goal: Verbalizes/displays adequate comfort level or baseline comfort level  4/14/2024 1010 by Gume Cristobal RN  Outcome: Progressing  4/14/2024 0125 by Nakul Pearl RN  Outcome: Progressing     Problem: Chronic Conditions and Co-morbidities  Goal: Patient's chronic conditions and co-morbidity symptoms are monitored and maintained or improved  4/14/2024 1010 by Gume Cristobal RN  Outcome: Progressing  4/14/2024 0125 by Nakul Pearl RN  Outcome: Progressing     Problem: Skin/Tissue Integrity  Goal: Absence of new skin breakdown  Description: 1.  Monitor for areas of redness and/or skin breakdown  2.  Assess vascular access sites hourly  3.  Every 4-6 hours minimum:  Change oxygen saturation probe site  4.  Every 4-6 hours:  If on nasal continuous positive airway pressure, respiratory therapy assess nares and determine need for appliance change or resting period.  4/14/2024 1010 by Gume Cristobal RN  Outcome: Progressing  4/14/2024 0125 by Nakul Pearl RN  Outcome: Progressing     Problem: Safety - Adult  Goal: Free from fall injury  4/14/2024 1010 by Gume Cristobal RN  Outcome: Progressing  4/14/2024 0125 by Nakul Pearl RN  Outcome: Progressing     
  Problem: Discharge Planning  Goal: Discharge to home or other facility with appropriate resources  4/6/2024 2248 by Pattie Cisneros RN  Outcome: Progressing  4/6/2024 1632 by Cher Bowen RN  Outcome: Progressing  Flowsheets (Taken 4/6/2024 0930)  Discharge to home or other facility with appropriate resources:   Identify barriers to discharge with patient and caregiver   Arrange for needed discharge resources and transportation as appropriate   Identify discharge learning needs (meds, wound care, etc)     Problem: Pain  Goal: Verbalizes/displays adequate comfort level or baseline comfort level  4/6/2024 2248 by Pattie Cisneros RN  Outcome: Progressing  4/6/2024 1632 by Cher Bowen RN  Outcome: Progressing  Flowsheets (Taken 4/6/2024 0930)  Verbalizes/displays adequate comfort level or baseline comfort level:   Encourage patient to monitor pain and request assistance   Assess pain using appropriate pain scale   Administer analgesics based on type and severity of pain and evaluate response     Problem: Chronic Conditions and Co-morbidities  Goal: Patient's chronic conditions and co-morbidity symptoms are monitored and maintained or improved  4/6/2024 2248 by Pattie Cisneros RN  Outcome: Progressing  4/6/2024 1632 by Cher Bowen RN  Outcome: Progressing  Flowsheets (Taken 4/6/2024 0930)  Care Plan - Patient's Chronic Conditions and Co-Morbidity Symptoms are Monitored and Maintained or Improved: Monitor and assess patient's chronic conditions and comorbid symptoms for stability, deterioration, or improvement     Problem: Skin/Tissue Integrity  Goal: Absence of new skin breakdown  Description: 1.  Monitor for areas of redness and/or skin breakdown  2.  Assess vascular access sites hourly  3.  Every 4-6 hours minimum:  Change oxygen saturation probe site  4.  Every 4-6 hours:  If on nasal continuous positive airway pressure, respiratory therapy assess nares and determine need for 
  Problem: Discharge Planning  Goal: Discharge to home or other facility with appropriate resources  4/8/2024 0001 by Jon Singh RN  Outcome: Progressing  4/7/2024 2355 by Jon Singh RN  Outcome: Progressing     Problem: Pain  Goal: Verbalizes/displays adequate comfort level or baseline comfort level  4/8/2024 0001 by Jon Singh RN  Outcome: Progressing  4/7/2024 2355 by Jno Singh RN  Outcome: Progressing     Problem: Chronic Conditions and Co-morbidities  Goal: Patient's chronic conditions and co-morbidity symptoms are monitored and maintained or improved  4/8/2024 0001 by Jon Singh RN  Outcome: Progressing  4/7/2024 2355 by Jon Singh RN  Outcome: Progressing     Problem: Skin/Tissue Integrity  Goal: Absence of new skin breakdown  Description: 1.  Monitor for areas of redness and/or skin breakdown  2.  Assess vascular access sites hourly  3.  Every 4-6 hours minimum:  Change oxygen saturation probe site  4.  Every 4-6 hours:  If on nasal continuous positive airway pressure, respiratory therapy assess nares and determine need for appliance change or resting period.  4/8/2024 0001 by Jon Singh RN  Outcome: Progressing  4/7/2024 2355 by Jon Singh RN  Outcome: Progressing     Problem: Safety - Adult  Goal: Free from fall injury  4/8/2024 0001 by Jon Singh RN  Outcome: Progressing  4/7/2024 2355 by Jon Singh RN  Outcome: Progressing     Problem: Discharge Planning  Goal: Discharge to home or other facility with appropriate resources  4/8/2024 0001 by Jon Singh RN  Outcome: Progressing  4/7/2024 2355 by Jon Singh RN  Outcome: Progressing     Problem: Pain  Goal: Verbalizes/displays adequate comfort level or baseline comfort level  4/8/2024 0001 by Jon Singh RN  Outcome: Progressing  4/7/2024 2355 by Jon Singh RN  Outcome: Progressing     Problem: Chronic Conditions and 
  Problem: Discharge Planning  Goal: Discharge to home or other facility with appropriate resources  4/8/2024 0006 by Jon Singh RN  Outcome: Progressing  4/8/2024 0001 by Jon Singh RN  Outcome: Progressing  4/7/2024 2355 by Jon Singh RN  Outcome: Progressing     Problem: Pain  Goal: Verbalizes/displays adequate comfort level or baseline comfort level  4/8/2024 0006 by Jon Singh RN  Outcome: Progressing  4/8/2024 0001 by Jon Singh RN  Outcome: Progressing  4/7/2024 2355 by Jon Singh RN  Outcome: Progressing     Problem: Chronic Conditions and Co-morbidities  Goal: Patient's chronic conditions and co-morbidity symptoms are monitored and maintained or improved  4/8/2024 0006 by Jon Singh RN  Outcome: Progressing  4/8/2024 0001 by Jon Singh RN  Outcome: Progressing  4/7/2024 2355 by Jon Singh RN  Outcome: Progressing     Problem: Skin/Tissue Integrity  Goal: Absence of new skin breakdown  Description: 1.  Monitor for areas of redness and/or skin breakdown  2.  Assess vascular access sites hourly  3.  Every 4-6 hours minimum:  Change oxygen saturation probe site  4.  Every 4-6 hours:  If on nasal continuous positive airway pressure, respiratory therapy assess nares and determine need for appliance change or resting period.  4/8/2024 0006 by Jon Singh RN  Outcome: Progressing  4/8/2024 0001 by Jon Singh RN  Outcome: Progressing  4/7/2024 2355 by Jon Singh RN  Outcome: Progressing     Problem: Safety - Adult  Goal: Free from fall injury  4/8/2024 0006 by Jon Singh RN  Outcome: Progressing  4/8/2024 0001 by Jon Singh RN  Outcome: Progressing  4/7/2024 2355 by Jon Singh RN  Outcome: Progressing     
  Problem: Discharge Planning  Goal: Discharge to home or other facility with appropriate resources  Outcome: Progressing     Problem: Chronic Conditions and Co-morbidities  Goal: Patient's chronic conditions and co-morbidity symptoms are monitored and maintained or improved  Outcome: Progressing     Problem: Safety - Adult  Goal: Free from fall injury  Outcome: Progressing     
  Problem: Discharge Planning  Goal: Discharge to home or other facility with appropriate resources  Outcome: Progressing     Problem: Pain  Goal: Verbalizes/displays adequate comfort level or baseline comfort level  Outcome: Progressing     Problem: Chronic Conditions and Co-morbidities  Goal: Patient's chronic conditions and co-morbidity symptoms are monitored and maintained or improved  Outcome: Progressing     Problem: Skin/Tissue Integrity  Goal: Absence of new skin breakdown  Description: 1.  Monitor for areas of redness and/or skin breakdown  2.  Assess vascular access sites hourly  3.  Every 4-6 hours minimum:  Change oxygen saturation probe site  4.  Every 4-6 hours:  If on nasal continuous positive airway pressure, respiratory therapy assess nares and determine need for appliance change or resting period.  Outcome: Progressing     Problem: Safety - Adult  Goal: Free from fall injury  Outcome: Progressing     
  Problem: Discharge Planning  Goal: Discharge to home or other facility with appropriate resources  Outcome: Progressing  Flowsheets (Taken 4/9/2024 0801)  Discharge to home or other facility with appropriate resources: Identify barriers to discharge with patient and caregiver     
  Problem: Pain  Goal: Verbalizes/displays adequate comfort level or baseline comfort level  4/9/2024 0017 by Sara Petit RN  Outcome: Progressing  4/8/2024 1840 by Jai Bond RN  Outcome: Progressing     Problem: Chronic Conditions and Co-morbidities  Goal: Patient's chronic conditions and co-morbidity symptoms are monitored and maintained or improved  4/9/2024 0017 by Sara Petit RN  Outcome: Progressing  4/8/2024 1840 by Jai Bond RN  Outcome: Progressing     Problem: Skin/Tissue Integrity  Goal: Absence of new skin breakdown  Description: 1.  Monitor for areas of redness and/or skin breakdown  2.  Assess vascular access sites hourly  3.  Every 4-6 hours minimum:  Change oxygen saturation probe site  4.  Every 4-6 hours:  If on nasal continuous positive airway pressure, respiratory therapy assess nares and determine need for appliance change or resting period.  4/9/2024 0017 by Sara Petit RN  Outcome: Progressing  4/8/2024 1840 by Jai Bond RN  Outcome: Progressing     Problem: Safety - Adult  Goal: Free from fall injury  4/9/2024 0017 by Sara Petit RN  Outcome: Progressing  4/8/2024 1840 by Jai Bond RN  Outcome: Progressing     
Medications to control blood glucose added today to regimen. Pt has been resting with eyes closed for most of the day. Barrier cream applied to sacrum prn. Purewick in place. VS stable this shift.   
Equipment:  (rolling shower chair)  Has the patient had two or more falls in the past year or any fall with injury in the past year?: Yes (1x/mo)  Receives Help From: Personal care attendant  ADL Assistance: Needs assistance  Toileting: Needs assistance  Homemaking Assistance: Needs assistance  Ambulation Assistance: Needs assistance (1 person CGA per son)  Transfer Assistance: Needs assistance (1 person assist for transfers per son)  Active : No  Patient's  Info: Transport bus at [facility]  Occupation: Retired    Cognitive/Behavioral Status:  Orientation  Overall Orientation Status: Within Normal Limits  Orientation Level: Oriented X4  Cognition  Overall Cognitive Status: WNL    Skin: grossly intact    Edema: mild B LEs    Hearing:   Hearing  Hearing: Within functional limits    Vision/Perceptual:    Vision - Basic Assessment  Prior Vision: Wears glasses only for reading     Vision  Vision: Within Functional Limits       Strength:    Strength: Generally decreased, functional (L LE weaker than R LE grossly)    Tone & Sensation:   Tone: Normal  Sensation: Impaired (impaired sensation B LEs thigh down)    Coordination:  Coordination: Generally decreased, functional    Range Of Motion:  AROM: Generally decreased, functional       Functional Mobility:  Bed Mobility:     Bed Mobility Training  Bed Mobility Training: Yes  Supine to Sit: Minimum assistance  Sit to Supine: Minimum assistance  Scooting: Contact-guard assistance  Transfers:     Transfer Training  Transfer Training: Yes  Sit to Stand: Minimum assistance;Assist X2;Adaptive equipment  Stand to Sit: Minimum assistance;Assist X2;Adaptive equipment  Toilet Transfer: Minimum assistance;Assist X2;Adaptive equipment  Balance:               Balance  Sitting: Impaired  Sitting - Static: Good (unsupported)  Sitting - Dynamic: Good (unsupported);Fair (occasional)  Standing: Impaired  Standing - Static: Constant support;Fair  Standing - Dynamic: Constant 
performance. Minimal to moderate modifications of tasks or assist (eg. physical or verbal) with assist is necessary to enable pt to complete eval   Based on the above components, the patient evaluation is determined to be of the following complexity level: Low

## 2024-04-19 PROBLEM — J18.9 BILATERAL PNEUMONIA: Status: ACTIVE | Noted: 2024-04-19

## 2024-04-19 SDOH — ECONOMIC STABILITY: TRANSPORTATION INSECURITY
IN THE PAST 12 MONTHS, HAS LACK OF TRANSPORTATION KEPT YOU FROM MEETINGS, WORK, OR FROM GETTING THINGS NEEDED FOR DAILY LIVING?: YES

## 2024-04-19 SDOH — ECONOMIC STABILITY: FOOD INSECURITY: WITHIN THE PAST 12 MONTHS, THE FOOD YOU BOUGHT JUST DIDN'T LAST AND YOU DIDN'T HAVE MONEY TO GET MORE.: PATIENT DECLINED

## 2024-04-19 SDOH — ECONOMIC STABILITY: FOOD INSECURITY: WITHIN THE PAST 12 MONTHS, YOU WORRIED THAT YOUR FOOD WOULD RUN OUT BEFORE YOU GOT MONEY TO BUY MORE.: PATIENT DECLINED

## 2024-04-19 SDOH — ECONOMIC STABILITY: HOUSING INSECURITY
IN THE LAST 12 MONTHS, WAS THERE A TIME WHEN YOU DID NOT HAVE A STEADY PLACE TO SLEEP OR SLEPT IN A SHELTER (INCLUDING NOW)?: NO

## 2024-04-19 SDOH — ECONOMIC STABILITY: INCOME INSECURITY: HOW HARD IS IT FOR YOU TO PAY FOR THE VERY BASICS LIKE FOOD, HOUSING, MEDICAL CARE, AND HEATING?: NOT HARD AT ALL

## 2024-04-20 NOTE — PROGRESS NOTES
Follow-Up from Hospital       HPI:  Siomara Collins is a 79 y.o. year old female who is here for a follow up visit for hospitalization transition of care.   She was last seen by me on 2/16/2024.     Discharged on: 4/16/2024    Diagnosis in hospital:   1  Severe sepsis due to bilateral pneumonia  2. (+) COVID and parainfluenza via Resp PCR  3. Sigmoid colitis  4. UTI  5. 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 Hypokalemia  7. Hypomagnesemia  8. Hypertension, HLD  9. Depression  10. Diabetes  11. Diabetic neuropathy  12. Chronic pain  13. Generalized weakness    Complications in hospital: No complications in hospital    Medication changes: Discontinue albuterol inhaler, Klonopin, Lotrisone, collagenase, Lomotil, lorazepam, Protonix, potassium, Seroquel, Crestor and start cefdinir 300 mg twice daily for 3 days, metronidazole 500 mg every 8 hours for 1 additional day after hospital discharge    Discharge Summary reviewed.  Today 4/22/2024      She reports the following: Since discharge from the hospital she has remained very weak.  She is still getting therapy at her assisted living facility.  She does want a prescription to keep the bed rails up at night as she is concerned about rolling out of bed.  She currently wants to make sure her chest is clear she is to also call but she is no longer requiring oxygen.  She denies any chest pain, shortness of breath or other cardiorespiratory complaints except a cough.  She notes no current GI or  complaints except a little burning was to make sure the urine infection has cleared up as she did have an ESBL infection of the urine for which she was in the hospital discharge from that hospitalization on March 8.  She notes no headaches, dizziness or new neurologic complaints but generalized weakness.  She has no new arthritic complaints.  She did unfortunately have a fall when she slid out of the car this morning and had

## 2024-04-22 ENCOUNTER — OFFICE VISIT (OUTPATIENT)
Facility: CLINIC | Age: 79
End: 2024-04-22

## 2024-04-22 VITALS
WEIGHT: 182 LBS | BODY MASS INDEX: 32.25 KG/M2 | TEMPERATURE: 97.7 F | OXYGEN SATURATION: 96 % | HEIGHT: 63 IN | HEART RATE: 73 BPM | SYSTOLIC BLOOD PRESSURE: 134 MMHG | DIASTOLIC BLOOD PRESSURE: 86 MMHG | RESPIRATION RATE: 18 BRPM

## 2024-04-22 DIAGNOSIS — J18.9 PNEUMONIA OF BOTH LOWER LOBES DUE TO INFECTIOUS ORGANISM: ICD-10-CM

## 2024-04-22 DIAGNOSIS — Z79.4 CONTROLLED TYPE 2 DIABETES MELLITUS WITH STAGE 2 CHRONIC KIDNEY DISEASE, WITH LONG-TERM CURRENT USE OF INSULIN (HCC): ICD-10-CM

## 2024-04-22 DIAGNOSIS — G62.9 NEUROPATHY: Primary | ICD-10-CM

## 2024-04-22 DIAGNOSIS — F33.9 RECURRENT DEPRESSION (HCC): ICD-10-CM

## 2024-04-22 DIAGNOSIS — U07.1 COVID-19: ICD-10-CM

## 2024-04-22 DIAGNOSIS — N18.2 CONTROLLED TYPE 2 DIABETES MELLITUS WITH STAGE 2 CHRONIC KIDNEY DISEASE, WITH LONG-TERM CURRENT USE OF INSULIN (HCC): ICD-10-CM

## 2024-04-22 DIAGNOSIS — E87.6 HYPOKALEMIA: ICD-10-CM

## 2024-04-22 DIAGNOSIS — N39.0 UTI DUE TO EXTENDED-SPECTRUM BETA LACTAMASE (ESBL) PRODUCING ESCHERICHIA COLI: ICD-10-CM

## 2024-04-22 DIAGNOSIS — I12.9 HYPERTENSION WITH RENAL DISEASE: ICD-10-CM

## 2024-04-22 DIAGNOSIS — Z16.12 UTI DUE TO EXTENDED-SPECTRUM BETA LACTAMASE (ESBL) PRODUCING ESCHERICHIA COLI: ICD-10-CM

## 2024-04-22 DIAGNOSIS — E11.22 CONTROLLED TYPE 2 DIABETES MELLITUS WITH STAGE 2 CHRONIC KIDNEY DISEASE, WITH LONG-TERM CURRENT USE OF INSULIN (HCC): ICD-10-CM

## 2024-04-22 DIAGNOSIS — A41.9 SEPSIS WITHOUT ACUTE ORGAN DYSFUNCTION, DUE TO UNSPECIFIED ORGANISM (HCC): Primary | ICD-10-CM

## 2024-04-22 DIAGNOSIS — E83.42 HYPOMAGNESEMIA: ICD-10-CM

## 2024-04-22 DIAGNOSIS — G62.9 NEUROPATHY: ICD-10-CM

## 2024-04-22 DIAGNOSIS — K52.9 COLITIS: ICD-10-CM

## 2024-04-22 DIAGNOSIS — R53.1 GENERAL WEAKNESS: ICD-10-CM

## 2024-04-22 DIAGNOSIS — Z09 HOSPITAL DISCHARGE FOLLOW-UP: ICD-10-CM

## 2024-04-22 DIAGNOSIS — B96.29 UTI DUE TO EXTENDED-SPECTRUM BETA LACTAMASE (ESBL) PRODUCING ESCHERICHIA COLI: ICD-10-CM

## 2024-04-22 DIAGNOSIS — J84.9 INTERSTITIAL LUNG DISEASE (HCC): ICD-10-CM

## 2024-04-22 LAB
ALBUMIN SERPL-MCNC: 3.6 G/DL (ref 3.5–5)
ALBUMIN/GLOB SERPL: 1.1 (ref 1.1–2.2)
ALP SERPL-CCNC: 106 U/L (ref 45–117)
ALT SERPL-CCNC: 47 U/L (ref 12–78)
ANION GAP SERPL CALC-SCNC: 3 MMOL/L (ref 5–15)
AST SERPL-CCNC: 24 U/L (ref 15–37)
BASOPHILS # BLD: 0 K/UL (ref 0–0.1)
BASOPHILS NFR BLD: 0 % (ref 0–1)
BILIRUB SERPL-MCNC: 0.8 MG/DL (ref 0.2–1)
BUN SERPL-MCNC: 21 MG/DL (ref 6–20)
BUN/CREAT SERPL: 30 (ref 12–20)
CALCIUM SERPL-MCNC: 9.8 MG/DL (ref 8.5–10.1)
CHLORIDE SERPL-SCNC: 100 MMOL/L (ref 97–108)
CO2 SERPL-SCNC: 31 MMOL/L (ref 21–32)
CREAT SERPL-MCNC: 0.69 MG/DL (ref 0.55–1.02)
DIFFERENTIAL METHOD BLD: ABNORMAL
EOSINOPHIL # BLD: 0.1 K/UL (ref 0–0.4)
EOSINOPHIL NFR BLD: 1 % (ref 0–7)
ERYTHROCYTE [DISTWIDTH] IN BLOOD BY AUTOMATED COUNT: 13.1 % (ref 11.5–14.5)
GLOBULIN SER CALC-MCNC: 3.3 G/DL (ref 2–4)
GLUCOSE SERPL-MCNC: 317 MG/DL (ref 65–100)
HCT VFR BLD AUTO: 48.5 % (ref 35–47)
HGB BLD-MCNC: 15.9 G/DL (ref 11.5–16)
IMM GRANULOCYTES # BLD AUTO: 0.1 K/UL (ref 0–0.04)
IMM GRANULOCYTES NFR BLD AUTO: 1 % (ref 0–0.5)
LYMPHOCYTES # BLD: 2.4 K/UL (ref 0.8–3.5)
LYMPHOCYTES NFR BLD: 26 % (ref 12–49)
MCH RBC QN AUTO: 33.8 PG (ref 26–34)
MCHC RBC AUTO-ENTMCNC: 32.8 G/DL (ref 30–36.5)
MCV RBC AUTO: 103.2 FL (ref 80–99)
MONOCYTES # BLD: 0.7 K/UL (ref 0–1)
MONOCYTES NFR BLD: 8 % (ref 5–13)
NEUTS SEG # BLD: 6 K/UL (ref 1.8–8)
NEUTS SEG NFR BLD: 64 % (ref 32–75)
NRBC # BLD: 0 K/UL (ref 0–0.01)
NRBC BLD-RTO: 0 PER 100 WBC
PLATELET # BLD AUTO: 204 K/UL (ref 150–400)
PMV BLD AUTO: 10.5 FL (ref 8.9–12.9)
POTASSIUM SERPL-SCNC: 5 MMOL/L (ref 3.5–5.1)
PROT SERPL-MCNC: 6.9 G/DL (ref 6.4–8.2)
RBC # BLD AUTO: 4.7 M/UL (ref 3.8–5.2)
SODIUM SERPL-SCNC: 134 MMOL/L (ref 136–145)
WBC # BLD AUTO: 9.3 K/UL (ref 3.6–11)

## 2024-04-22 RX ORDER — LIDOCAINE 4 G/G
1 PATCH TOPICAL DAILY
Qty: 30 PATCH | Refills: 5 | Status: SHIPPED | OUTPATIENT
Start: 2024-04-22

## 2024-04-22 RX ORDER — GABAPENTIN 100 MG/1
CAPSULE ORAL
Qty: 120 CAPSULE | Refills: 5 | Status: SHIPPED | OUTPATIENT
Start: 2024-04-22 | End: 2024-05-22

## 2024-04-22 RX ORDER — PREDNISONE 10 MG/1
TABLET ORAL
Qty: 90 TABLET | Refills: 5 | Status: SHIPPED | OUTPATIENT
Start: 2024-04-22

## 2024-04-22 ASSESSMENT — PATIENT HEALTH QUESTIONNAIRE - PHQ9
SUM OF ALL RESPONSES TO PHQ QUESTIONS 1-9: 0
3. TROUBLE FALLING OR STAYING ASLEEP: NOT AT ALL
2. FEELING DOWN, DEPRESSED OR HOPELESS: NOT AT ALL
SUM OF ALL RESPONSES TO PHQ9 QUESTIONS 1 & 2: 0
8. MOVING OR SPEAKING SO SLOWLY THAT OTHER PEOPLE COULD HAVE NOTICED. OR THE OPPOSITE, BEING SO FIGETY OR RESTLESS THAT YOU HAVE BEEN MOVING AROUND A LOT MORE THAN USUAL: NOT AT ALL
5. POOR APPETITE OR OVEREATING: NOT AT ALL
7. TROUBLE CONCENTRATING ON THINGS, SUCH AS READING THE NEWSPAPER OR WATCHING TELEVISION: NOT AT ALL
1. LITTLE INTEREST OR PLEASURE IN DOING THINGS: NOT AT ALL
SUM OF ALL RESPONSES TO PHQ QUESTIONS 1-9: 0
9. THOUGHTS THAT YOU WOULD BE BETTER OFF DEAD, OR OF HURTING YOURSELF: NOT AT ALL
10. IF YOU CHECKED OFF ANY PROBLEMS, HOW DIFFICULT HAVE THESE PROBLEMS MADE IT FOR YOU TO DO YOUR WORK, TAKE CARE OF THINGS AT HOME, OR GET ALONG WITH OTHER PEOPLE: NOT DIFFICULT AT ALL
SUM OF ALL RESPONSES TO PHQ QUESTIONS 1-9: 0
6. FEELING BAD ABOUT YOURSELF - OR THAT YOU ARE A FAILURE OR HAVE LET YOURSELF OR YOUR FAMILY DOWN: NOT AT ALL
SUM OF ALL RESPONSES TO PHQ QUESTIONS 1-9: 0
4. FEELING TIRED OR HAVING LITTLE ENERGY: NOT AT ALL

## 2024-04-22 NOTE — PROGRESS NOTES
Siomara Collins is a 79 y.o. female     Chief Complaint   Patient presents with    Follow-Up from Hospital       BP (!) 156/91 (Site: Left Upper Arm, Position: Sitting, Cuff Size: Medium Adult)   Pulse 73   Temp 97.7 °F (36.5 °C) (Temporal)   Resp 18   Ht 1.6 m (5' 3\")   Wt 82.6 kg (182 lb)   SpO2 96%   BMI 32.24 kg/m²     Health Maintenance Due   Topic Date Due    COVID-19 Vaccine (1) Never done    Shingles vaccine (1 of 2) Never done    DEXA (modify frequency per FRAX score)  Never done    Respiratory Syncytial Virus (RSV) Pregnant or age 60 yrs+ (1 - 1-dose 60+ series) Never done    Pneumococcal 65+ years Vaccine (2 of 2 - PPSV23 or PCV20) 09/21/2015    Annual Wellness Visit (Medicare)  Never done         \"Have you been to the ER, urgent care clinic since your last visit?  Hospitalized since your last visit?\"    Yes, on file.    “Have you seen or consulted any other health care providers outside of [facility] since your last visit?”    NO

## 2024-04-22 NOTE — TELEPHONE ENCOUNTER
Requested Prescriptions     Pending Prescriptions Disp Refills    diclofenac sodium (VOLTAREN) 1 % GEL 30 g 5     Sig: Apply 2 g topically in the morning, at noon, in the evening, and at bedtime    lidocaine (HM LIDOCAINE PATCH) 4 % external patch 30 patch 5     Sig: Place 1 patch onto the skin daily    predniSONE (DELTASONE) 10 MG tablet 90 tablet 5     Sig: Take 3 tablets daily    gabapentin (NEURONTIN) 100 MG capsule 120 capsule 5     Sig: Take 1 tablet twice a day and 2 tablets at bedtime

## 2024-05-13 ENCOUNTER — OFFICE VISIT (OUTPATIENT)
Facility: CLINIC | Age: 79
End: 2024-05-13
Payer: MEDICARE

## 2024-05-13 VITALS
DIASTOLIC BLOOD PRESSURE: 84 MMHG | HEART RATE: 71 BPM | RESPIRATION RATE: 18 BRPM | BODY MASS INDEX: 32.6 KG/M2 | OXYGEN SATURATION: 95 % | HEIGHT: 63 IN | WEIGHT: 184 LBS | TEMPERATURE: 99.1 F | SYSTOLIC BLOOD PRESSURE: 131 MMHG

## 2024-05-13 DIAGNOSIS — J96.11 CHRONIC HYPOXEMIC RESPIRATORY FAILURE (HCC): ICD-10-CM

## 2024-05-13 DIAGNOSIS — J84.9 INTERSTITIAL LUNG DISEASE (HCC): ICD-10-CM

## 2024-05-13 DIAGNOSIS — I12.9 HYPERTENSION WITH RENAL DISEASE: Primary | ICD-10-CM

## 2024-05-13 DIAGNOSIS — N18.2 CKD (CHRONIC KIDNEY DISEASE), STAGE II: ICD-10-CM

## 2024-05-13 DIAGNOSIS — N18.2 CONTROLLED TYPE 2 DIABETES MELLITUS WITH STAGE 2 CHRONIC KIDNEY DISEASE, WITH LONG-TERM CURRENT USE OF INSULIN (HCC): ICD-10-CM

## 2024-05-13 DIAGNOSIS — M48.061 SPINAL STENOSIS OF LUMBAR REGION, UNSPECIFIED WHETHER NEUROGENIC CLAUDICATION PRESENT: ICD-10-CM

## 2024-05-13 DIAGNOSIS — M15.9 PRIMARY OSTEOARTHRITIS INVOLVING MULTIPLE JOINTS: ICD-10-CM

## 2024-05-13 DIAGNOSIS — F33.9 RECURRENT DEPRESSION (HCC): ICD-10-CM

## 2024-05-13 DIAGNOSIS — E66.09 CLASS 1 OBESITY DUE TO EXCESS CALORIES WITHOUT SERIOUS COMORBIDITY WITH BODY MASS INDEX (BMI) OF 33.0 TO 33.9 IN ADULT: ICD-10-CM

## 2024-05-13 DIAGNOSIS — B37.31 MONILIAL VAGINITIS: ICD-10-CM

## 2024-05-13 DIAGNOSIS — K58.9 IRRITABLE BOWEL SYNDROME, UNSPECIFIED TYPE: ICD-10-CM

## 2024-05-13 DIAGNOSIS — G89.29 CHRONIC MIDLINE LOW BACK PAIN WITHOUT SCIATICA: ICD-10-CM

## 2024-05-13 DIAGNOSIS — M35.3 POLYMYALGIA RHEUMATICA (HCC): ICD-10-CM

## 2024-05-13 DIAGNOSIS — E55.9 VITAMIN D DEFICIENCY: ICD-10-CM

## 2024-05-13 DIAGNOSIS — E78.2 MIXED HYPERLIPIDEMIA: ICD-10-CM

## 2024-05-13 DIAGNOSIS — M54.50 CHRONIC MIDLINE LOW BACK PAIN WITHOUT SCIATICA: ICD-10-CM

## 2024-05-13 DIAGNOSIS — Z00.00 MEDICARE ANNUAL WELLNESS VISIT, SUBSEQUENT: ICD-10-CM

## 2024-05-13 DIAGNOSIS — R53.81 PHYSICAL DEBILITY: ICD-10-CM

## 2024-05-13 DIAGNOSIS — R79.89 ABNORMAL LFTS: ICD-10-CM

## 2024-05-13 DIAGNOSIS — M48.02 CERVICAL STENOSIS OF SPINE: ICD-10-CM

## 2024-05-13 DIAGNOSIS — Z13.39 ALCOHOL SCREENING: ICD-10-CM

## 2024-05-13 DIAGNOSIS — N39.0 RECURRENT UTI: ICD-10-CM

## 2024-05-13 DIAGNOSIS — F41.9 ANXIETY: ICD-10-CM

## 2024-05-13 DIAGNOSIS — J30.89 NON-SEASONAL ALLERGIC RHINITIS, UNSPECIFIED TRIGGER: ICD-10-CM

## 2024-05-13 DIAGNOSIS — N32.81 OVERACTIVE BLADDER: ICD-10-CM

## 2024-05-13 DIAGNOSIS — E83.42 HYPOMAGNESEMIA: ICD-10-CM

## 2024-05-13 DIAGNOSIS — Z79.4 CONTROLLED TYPE 2 DIABETES MELLITUS WITH STAGE 2 CHRONIC KIDNEY DISEASE, WITH LONG-TERM CURRENT USE OF INSULIN (HCC): ICD-10-CM

## 2024-05-13 DIAGNOSIS — E11.22 CONTROLLED TYPE 2 DIABETES MELLITUS WITH STAGE 2 CHRONIC KIDNEY DISEASE, WITH LONG-TERM CURRENT USE OF INSULIN (HCC): ICD-10-CM

## 2024-05-13 PROCEDURE — 1111F DSCHRG MED/CURRENT MED MERGE: CPT | Performed by: INTERNAL MEDICINE

## 2024-05-13 PROCEDURE — 1123F ACP DISCUSS/DSCN MKR DOCD: CPT | Performed by: INTERNAL MEDICINE

## 2024-05-13 PROCEDURE — 99215 OFFICE O/P EST HI 40 MIN: CPT | Performed by: INTERNAL MEDICINE

## 2024-05-13 PROCEDURE — G8427 DOCREV CUR MEDS BY ELIG CLIN: HCPCS | Performed by: INTERNAL MEDICINE

## 2024-05-13 PROCEDURE — G0439 PPPS, SUBSEQ VISIT: HCPCS | Performed by: INTERNAL MEDICINE

## 2024-05-13 PROCEDURE — 1090F PRES/ABSN URINE INCON ASSESS: CPT | Performed by: INTERNAL MEDICINE

## 2024-05-13 PROCEDURE — G0442 ANNUAL ALCOHOL SCREEN 15 MIN: HCPCS | Performed by: INTERNAL MEDICINE

## 2024-05-13 PROCEDURE — G8417 CALC BMI ABV UP PARAM F/U: HCPCS | Performed by: INTERNAL MEDICINE

## 2024-05-13 PROCEDURE — 1036F TOBACCO NON-USER: CPT | Performed by: INTERNAL MEDICINE

## 2024-05-13 PROCEDURE — G8400 PT W/DXA NO RESULTS DOC: HCPCS | Performed by: INTERNAL MEDICINE

## 2024-05-13 RX ORDER — FLUCONAZOLE 100 MG/1
100 TABLET ORAL DAILY
Qty: 7 TABLET | Refills: 0 | Status: SHIPPED | OUTPATIENT
Start: 2024-05-13 | End: 2024-05-20

## 2024-05-13 RX ORDER — LOPERAMIDE HYDROCHLORIDE 2 MG/1
2 CAPSULE ORAL 4 TIMES DAILY PRN
COMMUNITY

## 2024-05-13 ASSESSMENT — PATIENT HEALTH QUESTIONNAIRE - PHQ9
2. FEELING DOWN, DEPRESSED OR HOPELESS: NOT AT ALL
9. THOUGHTS THAT YOU WOULD BE BETTER OFF DEAD, OR OF HURTING YOURSELF: NOT AT ALL
SUM OF ALL RESPONSES TO PHQ QUESTIONS 1-9: 0
8. MOVING OR SPEAKING SO SLOWLY THAT OTHER PEOPLE COULD HAVE NOTICED. OR THE OPPOSITE, BEING SO FIGETY OR RESTLESS THAT YOU HAVE BEEN MOVING AROUND A LOT MORE THAN USUAL: NOT AT ALL
10. IF YOU CHECKED OFF ANY PROBLEMS, HOW DIFFICULT HAVE THESE PROBLEMS MADE IT FOR YOU TO DO YOUR WORK, TAKE CARE OF THINGS AT HOME, OR GET ALONG WITH OTHER PEOPLE: NOT DIFFICULT AT ALL
6. FEELING BAD ABOUT YOURSELF - OR THAT YOU ARE A FAILURE OR HAVE LET YOURSELF OR YOUR FAMILY DOWN: NOT AT ALL
4. FEELING TIRED OR HAVING LITTLE ENERGY: NOT AT ALL
3. TROUBLE FALLING OR STAYING ASLEEP: NOT AT ALL
SUM OF ALL RESPONSES TO PHQ QUESTIONS 1-9: 0
5. POOR APPETITE OR OVEREATING: NOT AT ALL
SUM OF ALL RESPONSES TO PHQ QUESTIONS 1-9: 0
SUM OF ALL RESPONSES TO PHQ QUESTIONS 1-9: 0
7. TROUBLE CONCENTRATING ON THINGS, SUCH AS READING THE NEWSPAPER OR WATCHING TELEVISION: NOT AT ALL
SUM OF ALL RESPONSES TO PHQ9 QUESTIONS 1 & 2: 0
1. LITTLE INTEREST OR PLEASURE IN DOING THINGS: NOT AT ALL

## 2024-05-13 NOTE — PROGRESS NOTES
Medicare Annual Wellness Visit    Siomara Collins is here for Medicare AWV    Assessment & Plan   Hypertension with renal disease  -     TSH; Future  -     T4, Free; Future  -     Comprehensive Metabolic Panel; Future  -     CBC with Auto Differential; Future  -     Urinalysis with Microscopic; Future  Controlled type 2 diabetes mellitus with stage 2 chronic kidney disease, with long-term current use of insulin (HCC)  -     Hemoglobin A1C; Future  -     Microalbumin / Creatinine Urine Ratio; Future  Mixed hyperlipidemia  -     Lipid Panel; Future  -     CK; Future  Primary osteoarthritis involving multiple joints  Interstitial lung disease (HCC)  Chronic hypoxemic respiratory failure (HCC)  Abnormal LFTs  Non-seasonal allergic rhinitis, unspecified trigger  Anxiety  Recurrent depression (HCC)  CKD (chronic kidney disease), stage II  Irritable bowel syndrome, unspecified type  Overactive bladder  Polymyalgia rheumatica (HCC)  Chronic midline low back pain without sciatica  Spinal stenosis of lumbar region, unspecified whether neurogenic claudication present  Cervical stenosis of spine  Class 1 obesity due to excess calories without serious comorbidity with body mass index (BMI) of 33.0 to 33.9 in adult  Vitamin D deficiency  -     Vitamin D 25 Hydroxy; Future  Alcohol screening  -     [place] ANNUAL ALCOHOL SCREEN 15 MIN  Medicare annual wellness visit, subsequent  Recurrent UTI  -     Urinalysis with Microscopic; Future  -     Culture, Urine; Future  Monilial vaginitis  Hypomagnesemia  -     Magnesium; Future  Physical debility    ASSESSMENT:   1. Hypertension with renal disease    2. Controlled type 2 diabetes mellitus with stage 2 chronic kidney disease, with long-term current use of insulin (HCC)    3. Mixed hyperlipidemia    4. Primary osteoarthritis involving multiple joints    5. Interstitial lung disease (HCC)    6. Chronic hypoxemic respiratory failure (HCC)    7. Abnormal LFTs    8. Non-seasonal allergic

## 2024-05-13 NOTE — PROGRESS NOTES
Siomara Collins is a 79 y.o. female     Chief Complaint   Patient presents with    Medicare AWV       /84 (Site: Right Upper Arm, Position: Sitting, Cuff Size: Medium Adult)   Pulse 71   Temp 99.1 °F (37.3 °C) (Temporal)   Resp 18   Ht 1.6 m (5' 3\")   Wt 83.5 kg (184 lb)   SpO2 95%   BMI 32.59 kg/m²     Health Maintenance Due   Topic Date Due    COVID-19 Vaccine (1) Never done    Shingles vaccine (1 of 2) Never done    DEXA (modify frequency per FRAX score)  Never done    Respiratory Syncytial Virus (RSV) Pregnant or age 60 yrs+ (1 - 1-dose 60+ series) Never done    Pneumococcal 65+ years Vaccine (2 of 2 - PPSV23 or PCV20) 09/21/2015    Annual Wellness Visit (Medicare)  Never done         \"Have you been to the ER, urgent care clinic since your last visit?  Hospitalized since your last visit?\"    NO    “Have you seen or consulted any other health care providers outside of [facility] since your last visit?”    NO

## 2024-05-14 LAB
25(OH)D3 SERPL-MCNC: 21.3 NG/ML (ref 30–100)
ALBUMIN SERPL-MCNC: 3.9 G/DL (ref 3.5–5)
ALBUMIN/GLOB SERPL: 1.1 (ref 1.1–2.2)
ALP SERPL-CCNC: 91 U/L (ref 45–117)
ALT SERPL-CCNC: 60 U/L (ref 12–78)
ANION GAP SERPL CALC-SCNC: 5 MMOL/L (ref 5–15)
APPEARANCE UR: CLEAR
AST SERPL-CCNC: 29 U/L (ref 15–37)
BACTERIA URNS QL MICRO: ABNORMAL /HPF
BASOPHILS # BLD: 0 K/UL (ref 0–0.1)
BASOPHILS NFR BLD: 0 % (ref 0–1)
BILIRUB SERPL-MCNC: 0.6 MG/DL (ref 0.2–1)
BILIRUB UR QL: NEGATIVE
BUN SERPL-MCNC: 24 MG/DL (ref 6–20)
BUN/CREAT SERPL: 30 (ref 12–20)
CALCIUM SERPL-MCNC: 9.9 MG/DL (ref 8.5–10.1)
CHLORIDE SERPL-SCNC: 100 MMOL/L (ref 97–108)
CHOLEST SERPL-MCNC: 318 MG/DL
CK SERPL-CCNC: 38 U/L (ref 26–192)
CO2 SERPL-SCNC: 31 MMOL/L (ref 21–32)
COLOR UR: ABNORMAL
CREAT SERPL-MCNC: 0.79 MG/DL (ref 0.55–1.02)
CREAT UR-MCNC: 77.8 MG/DL
DIFFERENTIAL METHOD BLD: ABNORMAL
EOSINOPHIL # BLD: 0 K/UL (ref 0–0.4)
EOSINOPHIL NFR BLD: 0 % (ref 0–7)
EPITH CASTS URNS QL MICRO: ABNORMAL /LPF
ERYTHROCYTE [DISTWIDTH] IN BLOOD BY AUTOMATED COUNT: 13.6 % (ref 11.5–14.5)
EST. AVERAGE GLUCOSE BLD GHB EST-MCNC: 243 MG/DL
GLOBULIN SER CALC-MCNC: 3.4 G/DL (ref 2–4)
GLUCOSE SERPL-MCNC: 318 MG/DL (ref 65–100)
GLUCOSE UR STRIP.AUTO-MCNC: NEGATIVE MG/DL
HBA1C MFR BLD: 10.1 % (ref 4–5.6)
HCT VFR BLD AUTO: 46.6 % (ref 35–47)
HDLC SERPL-MCNC: 56 MG/DL
HDLC SERPL: 5.7 (ref 0–5)
HGB BLD-MCNC: 16 G/DL (ref 11.5–16)
HGB UR QL STRIP: NEGATIVE
HYALINE CASTS URNS QL MICRO: ABNORMAL /LPF (ref 0–5)
IMM GRANULOCYTES # BLD AUTO: 0.1 K/UL (ref 0–0.04)
IMM GRANULOCYTES NFR BLD AUTO: 1 % (ref 0–0.5)
KETONES UR QL STRIP.AUTO: NEGATIVE MG/DL
LDLC SERPL CALC-MCNC: 214 MG/DL (ref 0–100)
LEUKOCYTE ESTERASE UR QL STRIP.AUTO: ABNORMAL
LYMPHOCYTES # BLD: 0.6 K/UL (ref 0.8–3.5)
LYMPHOCYTES NFR BLD: 7 % (ref 12–49)
MAGNESIUM SERPL-MCNC: 1.8 MG/DL (ref 1.6–2.4)
MCH RBC QN AUTO: 34.4 PG (ref 26–34)
MCHC RBC AUTO-ENTMCNC: 34.3 G/DL (ref 30–36.5)
MCV RBC AUTO: 100.2 FL (ref 80–99)
MICROALBUMIN UR-MCNC: 1.73 MG/DL
MICROALBUMIN/CREAT UR-RTO: 22 MG/G (ref 0–30)
MONOCYTES # BLD: 0.3 K/UL (ref 0–1)
MONOCYTES NFR BLD: 3 % (ref 5–13)
NEUTS SEG # BLD: 7.8 K/UL (ref 1.8–8)
NEUTS SEG NFR BLD: 89 % (ref 32–75)
NITRITE UR QL STRIP.AUTO: NEGATIVE
NRBC # BLD: 0 K/UL (ref 0–0.01)
NRBC BLD-RTO: 0 PER 100 WBC
PH UR STRIP: 6 (ref 5–8)
PLATELET # BLD AUTO: 199 K/UL (ref 150–400)
PMV BLD AUTO: 10.5 FL (ref 8.9–12.9)
POTASSIUM SERPL-SCNC: 4.1 MMOL/L (ref 3.5–5.1)
PROT SERPL-MCNC: 7.3 G/DL (ref 6.4–8.2)
PROT UR STRIP-MCNC: NEGATIVE MG/DL
RBC # BLD AUTO: 4.65 M/UL (ref 3.8–5.2)
RBC #/AREA URNS HPF: ABNORMAL /HPF (ref 0–5)
RBC MORPH BLD: ABNORMAL
SODIUM SERPL-SCNC: 136 MMOL/L (ref 136–145)
SP GR UR REFRACTOMETRY: 1.02 (ref 1–1.03)
T4 FREE SERPL-MCNC: 1 NG/DL (ref 0.8–1.5)
TRIGL SERPL-MCNC: 240 MG/DL
TSH SERPL DL<=0.05 MIU/L-ACNC: 0.23 UIU/ML (ref 0.36–3.74)
UROBILINOGEN UR QL STRIP.AUTO: 0.2 EU/DL (ref 0.2–1)
VLDLC SERPL CALC-MCNC: 48 MG/DL
WBC # BLD AUTO: 8.8 K/UL (ref 3.6–11)
WBC URNS QL MICRO: ABNORMAL /HPF (ref 0–4)

## 2024-05-15 LAB
BACTERIA SPEC CULT: ABNORMAL
CC UR VC: ABNORMAL
SERVICE CMNT-IMP: ABNORMAL

## 2024-05-16 RX ORDER — ROSUVASTATIN CALCIUM 20 MG/1
20 TABLET, COATED ORAL NIGHTLY
Qty: 30 TABLET | Refills: 0 | Status: SHIPPED | OUTPATIENT
Start: 2024-05-16

## 2024-05-16 RX ORDER — GRANULES FOR ORAL 3 G/1
3 POWDER ORAL ONCE
Qty: 1 EACH | Refills: 0 | Status: SHIPPED | OUTPATIENT
Start: 2024-05-16 | End: 2024-05-16

## 2024-05-16 RX ORDER — MELATONIN
1000 DAILY
Qty: 30 TABLET | Refills: 0 | Status: SHIPPED | OUTPATIENT
Start: 2024-05-16

## 2024-05-21 ENCOUNTER — TELEPHONE (OUTPATIENT)
Facility: CLINIC | Age: 79
End: 2024-05-21

## 2024-05-21 NOTE — TELEPHONE ENCOUNTER
Recently patient was prescribed Crestor.  Assisted living states the pharmacy states that \"statins\" are listed as a allergy on her medication list and they don't want to give it to her unless its okayed by her Doctor.  I looked thru her old medications and see she was on crestor before and it was d/c.  There is no note regarding why.  What to do?

## 2024-05-22 ENCOUNTER — TELEPHONE (OUTPATIENT)
Facility: CLINIC | Age: 79
End: 2024-05-22

## 2024-05-22 RX ORDER — INSULIN GLARGINE 100 [IU]/ML
INJECTION, SOLUTION SUBCUTANEOUS
Qty: 10 ML | Refills: 5 | Status: SHIPPED | OUTPATIENT
Start: 2024-05-22

## 2024-05-22 NOTE — TELEPHONE ENCOUNTER
She had a side effect to one NOT an allergy.    Discussed this with Carmencita who is the assistant director of Nursing at the [facility].

## 2024-05-22 NOTE — TELEPHONE ENCOUNTER
RX refill request from the patient/pharmacy. Patient last seen 05- with labs, and next appt. scheduled for 05-  Requested Prescriptions     Pending Prescriptions Disp Refills    insulin glargine (LANTUS) 100 UNIT/ML injection vial 10 mL 5     Sig: Inject 40 units in the morning and 10 units before dinner    .

## 2024-05-22 NOTE — TELEPHONE ENCOUNTER
Patient states she continues with a yeast infection.  Took the diflucan 100 mg daily x 7 days and she is still very uncomfortable.  Requesting re treatment.

## 2024-05-23 RX ORDER — OMEPRAZOLE 20 MG/1
CAPSULE, DELAYED RELEASE ORAL
Qty: 30 CAPSULE | Refills: 0 | Status: SHIPPED | OUTPATIENT
Start: 2024-05-23

## 2024-05-23 RX ORDER — PRIMIDONE 50 MG/1
TABLET ORAL
Qty: 60 TABLET | Refills: 0 | Status: SHIPPED | OUTPATIENT
Start: 2024-05-23

## 2024-05-23 RX ORDER — ESCITALOPRAM OXALATE 10 MG/1
10 TABLET ORAL DAILY
Qty: 30 TABLET | Refills: 0 | Status: SHIPPED | OUTPATIENT
Start: 2024-05-23

## 2024-05-23 NOTE — TELEPHONE ENCOUNTER
PCP: Irvin Vang MD    Last appt: 5/13/2024  Future Appointments   Date Time Provider Department Center   5/29/2024  2:10 PM Irvin Vang MD [facility]   6/14/2024  1:20 PM Irvin Vang MD [facility] AM[facility]   8/14/2024 10:10 AM Irvin Vang MD [facility]       Requested Prescriptions     Pending Prescriptions Disp Refills    omeprazole (PRILOSEC) 20 MG delayed release capsule [Pharmacy Med Name: OMEPRAZOLE DR 20 MG CAPSULE] 30 capsule 0     Sig: TAKE ONE CAPSULE BY MOUTH IN THE MORNING FOR GERD    primidone (MYSOLINE) 50 MG tablet [Pharmacy Med Name: PRIMIDONE 50 MG TABLET] 60 tablet 0     Sig: TAKE ONE TABLET BY MOUTH 2 TIMES A DAY FOR TREMORS    escitalopram (LEXAPRO) 10 MG tablet [Pharmacy Med Name: ESCITALOPRAM 10 MG TABLET] 30 tablet 0     Sig: TAKE 1 TABLET BY MOUTH ONCE A DAY       Prior labs and Blood pressures:  BP Readings from Last 3 Encounters:   05/13/24 131/84   04/22/24 134/86   04/15/24 135/70     Lab Results   Component Value Date/Time     05/13/2024 02:43 PM    K 4.1 05/13/2024 02:43 PM     05/13/2024 02:43 PM    CO2 31 05/13/2024 02:43 PM    BUN 24 05/13/2024 02:43 PM    GFRAA >60 09/09/2022 02:37 PM     No results found for: \"HBA1C\", \"TQI4ULEO\"  Lab Results   Component Value Date/Time    CHOL 318 05/13/2024 02:43 PM    HDL 56 05/13/2024 02:43 PM     05/13/2024 02:43 PM    LDL 56.6 01/17/2023 10:35 AM    VLDL 48 05/13/2024 02:43 PM    VLDL 59 04/02/2021 03:56 PM     No results found for: \"VITD3\"        Lab Results   Component Value Date/Time    TSH 2.45 04/15/2022 11:44 AM

## 2024-05-28 RX ORDER — FLUCONAZOLE 100 MG/1
100 TABLET ORAL DAILY
Qty: 7 TABLET | Refills: 0 | Status: SHIPPED | OUTPATIENT
Start: 2024-05-28 | End: 2024-06-04

## 2024-05-28 RX ORDER — INSULIN GLARGINE 100 [IU]/ML
INJECTION, SOLUTION SUBCUTANEOUS
Qty: 5 ADJUSTABLE DOSE PRE-FILLED PEN SYRINGE | Refills: 3 | Status: SHIPPED | OUTPATIENT
Start: 2024-05-28 | End: 2024-05-29 | Stop reason: SDUPTHER

## 2024-05-28 NOTE — TELEPHONE ENCOUNTER
PCP: Irvin Vang MD    Last appt: 5/13/2024  Future Appointments   Date Time Provider Department Center   5/29/2024  2:10 PM Irvin Vang MD [facility]   6/14/2024  1:20 PM Irvin Vang MD [facility] AM[facility]   8/14/2024 10:10 AM Irvin Vang MD [facility]       Requested Prescriptions     Pending Prescriptions Disp Refills    insulin glargine (LANTUS SOLOSTAR) 100 UNIT/ML injection pen 5 Adjustable Dose Pre-filled Pen Syringe 3     Sig: Inject 40 units in the morning and 10 units before dinner       Prior labs and Blood pressures:  BP Readings from Last 3 Encounters:   05/13/24 131/84   04/22/24 134/86   04/15/24 135/70     Lab Results   Component Value Date/Time     05/13/2024 02:43 PM    K 4.1 05/13/2024 02:43 PM     05/13/2024 02:43 PM    CO2 31 05/13/2024 02:43 PM    BUN 24 05/13/2024 02:43 PM    GFRAA >60 09/09/2022 02:37 PM     No results found for: \"HBA1C\", \"HQF2OXNK\"  Lab Results   Component Value Date/Time    CHOL 318 05/13/2024 02:43 PM    HDL 56 05/13/2024 02:43 PM     05/13/2024 02:43 PM    LDL 56.6 01/17/2023 10:35 AM    VLDL 48 05/13/2024 02:43 PM    VLDL 59 04/02/2021 03:56 PM     No results found for: \"VITD3\"        Lab Results   Component Value Date/Time    TSH 2.45 04/15/2022 11:44 AM

## 2024-05-29 ENCOUNTER — OFFICE VISIT (OUTPATIENT)
Facility: CLINIC | Age: 79
End: 2024-05-29
Payer: MEDICARE

## 2024-05-29 VITALS
SYSTOLIC BLOOD PRESSURE: 120 MMHG | DIASTOLIC BLOOD PRESSURE: 80 MMHG | TEMPERATURE: 98.8 F | BODY MASS INDEX: 34.29 KG/M2 | WEIGHT: 193.5 LBS | RESPIRATION RATE: 18 BRPM | HEART RATE: 81 BPM | OXYGEN SATURATION: 94 % | HEIGHT: 63 IN

## 2024-05-29 DIAGNOSIS — N18.2 CONTROLLED TYPE 2 DIABETES MELLITUS WITH STAGE 2 CHRONIC KIDNEY DISEASE, WITH LONG-TERM CURRENT USE OF INSULIN (HCC): ICD-10-CM

## 2024-05-29 DIAGNOSIS — R60.9 EDEMA, UNSPECIFIED TYPE: ICD-10-CM

## 2024-05-29 DIAGNOSIS — E11.22 CONTROLLED TYPE 2 DIABETES MELLITUS WITH STAGE 2 CHRONIC KIDNEY DISEASE, WITH LONG-TERM CURRENT USE OF INSULIN (HCC): ICD-10-CM

## 2024-05-29 DIAGNOSIS — F41.9 ANXIETY: ICD-10-CM

## 2024-05-29 DIAGNOSIS — E66.09 CLASS 1 OBESITY DUE TO EXCESS CALORIES WITHOUT SERIOUS COMORBIDITY WITH BODY MASS INDEX (BMI) OF 33.0 TO 33.9 IN ADULT: ICD-10-CM

## 2024-05-29 DIAGNOSIS — Z79.4 CONTROLLED TYPE 2 DIABETES MELLITUS WITH STAGE 2 CHRONIC KIDNEY DISEASE, WITH LONG-TERM CURRENT USE OF INSULIN (HCC): ICD-10-CM

## 2024-05-29 DIAGNOSIS — F33.9 RECURRENT DEPRESSION (HCC): ICD-10-CM

## 2024-05-29 DIAGNOSIS — N39.0 RECURRENT UTI: ICD-10-CM

## 2024-05-29 DIAGNOSIS — J84.9 INTERSTITIAL LUNG DISEASE (HCC): ICD-10-CM

## 2024-05-29 DIAGNOSIS — J96.11 CHRONIC HYPOXEMIC RESPIRATORY FAILURE (HCC): Primary | ICD-10-CM

## 2024-05-29 PROCEDURE — 1090F PRES/ABSN URINE INCON ASSESS: CPT | Performed by: INTERNAL MEDICINE

## 2024-05-29 PROCEDURE — 99215 OFFICE O/P EST HI 40 MIN: CPT | Performed by: INTERNAL MEDICINE

## 2024-05-29 PROCEDURE — 3046F HEMOGLOBIN A1C LEVEL >9.0%: CPT | Performed by: INTERNAL MEDICINE

## 2024-05-29 PROCEDURE — G8400 PT W/DXA NO RESULTS DOC: HCPCS | Performed by: INTERNAL MEDICINE

## 2024-05-29 PROCEDURE — G8417 CALC BMI ABV UP PARAM F/U: HCPCS | Performed by: INTERNAL MEDICINE

## 2024-05-29 PROCEDURE — 1123F ACP DISCUSS/DSCN MKR DOCD: CPT | Performed by: INTERNAL MEDICINE

## 2024-05-29 PROCEDURE — 1036F TOBACCO NON-USER: CPT | Performed by: INTERNAL MEDICINE

## 2024-05-29 PROCEDURE — G8427 DOCREV CUR MEDS BY ELIG CLIN: HCPCS | Performed by: INTERNAL MEDICINE

## 2024-05-29 RX ORDER — INSULIN GLARGINE 100 [IU]/ML
INJECTION, SOLUTION SUBCUTANEOUS
Qty: 5 ADJUSTABLE DOSE PRE-FILLED PEN SYRINGE | Refills: 3 | Status: SHIPPED | OUTPATIENT
Start: 2024-05-29

## 2024-05-29 RX ORDER — FUROSEMIDE 20 MG/1
20 TABLET ORAL DAILY
Qty: 60 TABLET | Refills: 3 | Status: SHIPPED | OUTPATIENT
Start: 2024-05-29

## 2024-05-29 NOTE — PROGRESS NOTES
Siomara Collins is a 79 y.o. female     Chief Complaint   Patient presents with    Follow-up     UTI F/U       /80 (Site: Left Upper Arm, Position: Sitting, Cuff Size: Large Adult)   Pulse 81   Temp 98.8 °F (37.1 °C) (Oral)   Resp 18   Ht 1.6 m (5' 3\")   Wt 87.8 kg (193 lb 8 oz)   SpO2 94%   BMI 34.28 kg/m²     Health Maintenance Due   Topic Date Due    COVID-19 Vaccine (1) Never done    Shingles vaccine (1 of 2) Never done    DEXA (modify frequency per FRAX score)  Never done    Respiratory Syncytial Virus (RSV) Pregnant or age 60 yrs+ (1 - 1-dose 60+ series) Never done    Pneumococcal 65+ years Vaccine (2 of 2 - PPSV23 or PCV20) 09/21/2015         \"Have you been to the ER, urgent care clinic since your last visit?  Hospitalized since your last visit?\"    NO    “Have you seen or consulted any other health care providers outside of [facility] since your last visit?”    NO                    
for - abdominal pain, blood in stools, heartburn or nausea/vomiting  Genito-Urinary: no dysuria, trouble voiding, or hematuria  Musculoskeletal: negative for - gait disturbance, joint pain, joint stiffness or joint swelling  Neurological: no TIA or stroke symptoms  Hematologic: no bruises, no bleeding  Lymphatic: no swollen glands  Integument: no lumps, mole changes, nail changes or rash  Endocrine:no malaise/lethargy poly uria or polydipsia or unexpected weight changes        Social History     Socioeconomic History    Marital status:      Spouse name: None    Number of children: None    Years of education: None    Highest education level: None   Tobacco Use    Smoking status: Never    Smokeless tobacco: Never   Vaping Use    Vaping Use: Never used   Substance and Sexual Activity    Alcohol use: No    Drug use: No    Sexual activity: Not Currently   Social History Narrative    Lives in [place] alone.  Has one son in [place] and one daughter in [facility].  .  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       Social Determinants of Health     Financial Resource Strain: Low Risk  (7/26/2022)    Overall Financial Resource Strain (CARDIA)     Difficulty of Paying Living Expenses: Not hard at all   Transportation Needs: No Transportation Needs (4/5/2024)    PRAPARE - Transportation     Lack of Transportation (Medical): No     Lack of Transportation (Non-Medical): No   Physical Activity: Inactive (5/13/2024)    Exercise Vital Sign     Days of Exercise per Week: 0 days     Minutes of Exercise per Session: 0 min   Housing Stability: High Risk (4/5/2024)    Housing Stability Vital Sign     Unable to Pay for Housing in the Last Year: No     Number of Places Lived in the Last Year: 3     Unstable Housing in the Last Year: No     Family History   Problem Relation Age of Onset    Kidney Disease Brother     Anesth Problems Neg Hx     Pacemaker Brother     Diabetes Brother     Lung Disease

## 2024-05-30 LAB
ANION GAP SERPL CALC-SCNC: 9 MMOL/L (ref 5–15)
APPEARANCE UR: ABNORMAL
BACTERIA URNS QL MICRO: ABNORMAL /HPF
BILIRUB UR QL: NEGATIVE
BUN SERPL-MCNC: 19 MG/DL (ref 6–20)
BUN/CREAT SERPL: 19 (ref 12–20)
CALCIUM SERPL-MCNC: 9.5 MG/DL (ref 8.5–10.1)
CHLORIDE SERPL-SCNC: 101 MMOL/L (ref 97–108)
CO2 SERPL-SCNC: 27 MMOL/L (ref 21–32)
COLOR UR: ABNORMAL
CREAT SERPL-MCNC: 1.02 MG/DL (ref 0.55–1.02)
EPITH CASTS URNS QL MICRO: ABNORMAL /LPF
GLUCOSE SERPL-MCNC: 422 MG/DL (ref 65–100)
GLUCOSE UR STRIP.AUTO-MCNC: >1000 MG/DL
HGB UR QL STRIP: NEGATIVE
KETONES UR QL STRIP.AUTO: NEGATIVE MG/DL
LEUKOCYTE ESTERASE UR QL STRIP.AUTO: NEGATIVE
NITRITE UR QL STRIP.AUTO: NEGATIVE
PH UR STRIP: 6 (ref 5–8)
POTASSIUM SERPL-SCNC: 3.9 MMOL/L (ref 3.5–5.1)
PROT UR STRIP-MCNC: NEGATIVE MG/DL
RBC #/AREA URNS HPF: ABNORMAL /HPF (ref 0–5)
SODIUM SERPL-SCNC: 137 MMOL/L (ref 136–145)
SP GR UR REFRACTOMETRY: 1.02 (ref 1–1.03)
UROBILINOGEN UR QL STRIP.AUTO: 0.2 EU/DL (ref 0.2–1)
WBC URNS QL MICRO: ABNORMAL /HPF (ref 0–4)

## 2024-05-31 LAB
BACTERIA SPEC CULT: ABNORMAL
CC UR VC: ABNORMAL
SERVICE CMNT-IMP: ABNORMAL

## 2024-05-31 RX ORDER — GRANULES FOR ORAL 3 G/1
POWDER ORAL
Qty: 2 EACH | Refills: 0 | Status: SHIPPED | OUTPATIENT
Start: 2024-05-31

## 2024-05-31 NOTE — TELEPHONE ENCOUNTER
RX refill request from the patient/pharmacy. Patient last seen 05- with labs, and advised to get a f/up ua and culture one week after second treatment.  Requested Prescriptions     Pending Prescriptions Disp Refills    fosfomycin tromethamine (MONUROL) 3 g PACK 2 each 0     Sig: Take one packet now and repeat in 1 week.

## 2024-06-06 DIAGNOSIS — J96.11 CHRONIC HYPOXEMIC RESPIRATORY FAILURE (HCC): Primary | ICD-10-CM

## 2024-06-11 PROBLEM — Z00.00 MEDICARE ANNUAL WELLNESS VISIT, SUBSEQUENT: Status: RESOLVED | Noted: 2020-02-12 | Resolved: 2024-06-11

## 2024-06-14 DIAGNOSIS — N30.00 ACUTE CYSTITIS WITHOUT HEMATURIA: Primary | ICD-10-CM

## 2024-06-15 LAB
APPEARANCE UR: CLEAR
BACTERIA URNS QL MICRO: ABNORMAL /HPF
BILIRUB UR QL: NEGATIVE
COLOR UR: ABNORMAL
EPITH CASTS URNS QL MICRO: ABNORMAL /LPF
GLUCOSE UR STRIP.AUTO-MCNC: >1000 MG/DL
HGB UR QL STRIP: NEGATIVE
HYALINE CASTS URNS QL MICRO: ABNORMAL /LPF (ref 0–5)
KETONES UR QL STRIP.AUTO: ABNORMAL MG/DL
LEUKOCYTE ESTERASE UR QL STRIP.AUTO: NEGATIVE
NITRITE UR QL STRIP.AUTO: NEGATIVE
PH UR STRIP: 6 (ref 5–8)
PROT UR STRIP-MCNC: NEGATIVE MG/DL
RBC #/AREA URNS HPF: ABNORMAL /HPF (ref 0–5)
SP GR UR REFRACTOMETRY: >1.03
UROBILINOGEN UR QL STRIP.AUTO: 1 EU/DL (ref 0.2–1)
WBC URNS QL MICRO: ABNORMAL /HPF (ref 0–4)
YEAST URNS QL MICRO: PRESENT

## 2024-06-16 LAB
BACTERIA SPEC CULT: ABNORMAL
CC UR VC: ABNORMAL
SERVICE CMNT-IMP: ABNORMAL

## 2024-06-18 ENCOUNTER — HOSPITAL ENCOUNTER (INPATIENT)
Facility: HOSPITAL | Age: 79
LOS: 2 days | Discharge: NURSING, REHAB OR LONG-TERM CARE | End: 2024-06-20
Attending: STUDENT IN AN ORGANIZED HEALTH CARE EDUCATION/TRAINING PROGRAM | Admitting: STUDENT IN AN ORGANIZED HEALTH CARE EDUCATION/TRAINING PROGRAM
Payer: MEDICARE

## 2024-06-18 ENCOUNTER — APPOINTMENT (OUTPATIENT)
Facility: HOSPITAL | Age: 79
End: 2024-06-18
Payer: MEDICARE

## 2024-06-18 DIAGNOSIS — R30.0 DYSURIA: ICD-10-CM

## 2024-06-18 DIAGNOSIS — R79.89 ELEVATED LACTIC ACID LEVEL: ICD-10-CM

## 2024-06-18 DIAGNOSIS — G62.9 NEUROPATHY: ICD-10-CM

## 2024-06-18 DIAGNOSIS — N30.90 RECURRENT BACTERIAL CYSTITIS: Primary | ICD-10-CM

## 2024-06-18 DIAGNOSIS — D72.828 OTHER ELEVATED WHITE BLOOD CELL (WBC) COUNT: ICD-10-CM

## 2024-06-18 DIAGNOSIS — E11.65 HYPERGLYCEMIA DUE TO DIABETES MELLITUS (HCC): ICD-10-CM

## 2024-06-18 PROBLEM — N39.0 COMPLICATED UTI (URINARY TRACT INFECTION): Status: ACTIVE | Noted: 2024-06-18

## 2024-06-18 LAB
ALBUMIN SERPL-MCNC: 3.3 G/DL (ref 3.5–5)
ALBUMIN/GLOB SERPL: 1.1 (ref 1.1–2.2)
ALP SERPL-CCNC: 78 U/L (ref 45–117)
ALT SERPL-CCNC: 53 U/L (ref 12–78)
ANION GAP SERPL CALC-SCNC: 7 MMOL/L (ref 5–15)
APPEARANCE UR: CLEAR
AST SERPL-CCNC: 31 U/L (ref 15–37)
BACTERIA SPEC CULT: ABNORMAL
BACTERIA SPEC CULT: ABNORMAL
BACTERIA URNS QL MICRO: ABNORMAL /HPF
BASOPHILS # BLD: 0 K/UL (ref 0–0.1)
BASOPHILS NFR BLD: 0 % (ref 0–1)
BILIRUB SERPL-MCNC: 0.6 MG/DL (ref 0.2–1)
BILIRUB UR QL: NEGATIVE
BUN SERPL-MCNC: 28 MG/DL (ref 6–20)
BUN/CREAT SERPL: 28 (ref 12–20)
CALCIUM SERPL-MCNC: 9.1 MG/DL (ref 8.5–10.1)
CC UR VC: ABNORMAL
CHLORIDE SERPL-SCNC: 98 MMOL/L (ref 97–108)
CO2 SERPL-SCNC: 29 MMOL/L (ref 21–32)
COLOR UR: ABNORMAL
CREAT SERPL-MCNC: 1 MG/DL (ref 0.55–1.02)
DIFFERENTIAL METHOD BLD: ABNORMAL
EOSINOPHIL # BLD: 0 K/UL (ref 0–0.4)
EOSINOPHIL NFR BLD: 0 % (ref 0–7)
EPITH CASTS URNS QL MICRO: ABNORMAL /LPF
ERYTHROCYTE [DISTWIDTH] IN BLOOD BY AUTOMATED COUNT: 13.6 % (ref 11.5–14.5)
GLOBULIN SER CALC-MCNC: 3.1 G/DL (ref 2–4)
GLUCOSE BLD STRIP.AUTO-MCNC: 262 MG/DL (ref 65–117)
GLUCOSE BLD STRIP.AUTO-MCNC: 325 MG/DL (ref 65–117)
GLUCOSE SERPL-MCNC: 439 MG/DL (ref 65–100)
GLUCOSE UR STRIP.AUTO-MCNC: 500 MG/DL
HCT VFR BLD AUTO: 42.1 % (ref 35–47)
HEMOCCULT STL QL: NEGATIVE
HGB BLD-MCNC: 14 G/DL (ref 11.5–16)
HGB UR QL STRIP: NEGATIVE
IMM GRANULOCYTES # BLD AUTO: 0.1 K/UL (ref 0–0.04)
IMM GRANULOCYTES NFR BLD AUTO: 1 % (ref 0–0.5)
KETONES UR QL STRIP.AUTO: NEGATIVE MG/DL
LACTATE BLD-SCNC: 2.64 MMOL/L (ref 0.4–2)
LACTATE SERPL-SCNC: 2.8 MMOL/L (ref 0.4–2)
LEUKOCYTE ESTERASE UR QL STRIP.AUTO: NEGATIVE
LIPASE SERPL-CCNC: 36 U/L (ref 13–75)
LYMPHOCYTES # BLD: 0.6 K/UL (ref 0.8–3.5)
LYMPHOCYTES NFR BLD: 5 % (ref 12–49)
MAGNESIUM SERPL-MCNC: 1.7 MG/DL (ref 1.6–2.4)
MCH RBC QN AUTO: 33.7 PG (ref 26–34)
MCHC RBC AUTO-ENTMCNC: 33.3 G/DL (ref 30–36.5)
MCV RBC AUTO: 101.4 FL (ref 80–99)
MONOCYTES # BLD: 0.3 K/UL (ref 0–1)
MONOCYTES NFR BLD: 3 % (ref 5–13)
NEUTS SEG # BLD: 10.6 K/UL (ref 1.8–8)
NEUTS SEG NFR BLD: 91 % (ref 32–75)
NITRITE UR QL STRIP.AUTO: POSITIVE
NRBC # BLD: 0 K/UL (ref 0–0.01)
NRBC BLD-RTO: 0 PER 100 WBC
PH UR STRIP: 6 (ref 5–8)
PLATELET # BLD AUTO: 215 K/UL (ref 150–400)
PMV BLD AUTO: 10.9 FL (ref 8.9–12.9)
POTASSIUM SERPL-SCNC: 4.3 MMOL/L (ref 3.5–5.1)
PROCALCITONIN SERPL-MCNC: <0.05 NG/ML
PROT SERPL-MCNC: 6.4 G/DL (ref 6.4–8.2)
PROT UR STRIP-MCNC: NEGATIVE MG/DL
RBC # BLD AUTO: 4.15 M/UL (ref 3.8–5.2)
RBC #/AREA URNS HPF: ABNORMAL /HPF (ref 0–5)
RBC MORPH BLD: ABNORMAL
SERVICE CMNT-IMP: ABNORMAL
SODIUM SERPL-SCNC: 134 MMOL/L (ref 136–145)
SP GR UR REFRACTOMETRY: 1.03
URINE CULTURE IF INDICATED: ABNORMAL
UROBILINOGEN UR QL STRIP.AUTO: 0.2 EU/DL (ref 0.2–1)
WBC # BLD AUTO: 11.6 K/UL (ref 3.6–11)
WBC URNS QL MICRO: ABNORMAL /HPF (ref 0–4)

## 2024-06-18 PROCEDURE — 85025 COMPLETE CBC W/AUTO DIFF WBC: CPT

## 2024-06-18 PROCEDURE — 81001 URINALYSIS AUTO W/SCOPE: CPT

## 2024-06-18 PROCEDURE — 36415 COLL VENOUS BLD VENIPUNCTURE: CPT

## 2024-06-18 PROCEDURE — 83605 ASSAY OF LACTIC ACID: CPT

## 2024-06-18 PROCEDURE — 87040 BLOOD CULTURE FOR BACTERIA: CPT

## 2024-06-18 PROCEDURE — 80053 COMPREHEN METABOLIC PANEL: CPT

## 2024-06-18 PROCEDURE — 74177 CT ABD & PELVIS W/CONTRAST: CPT

## 2024-06-18 PROCEDURE — 83690 ASSAY OF LIPASE: CPT

## 2024-06-18 PROCEDURE — 99285 EMERGENCY DEPT VISIT HI MDM: CPT

## 2024-06-18 PROCEDURE — 2580000003 HC RX 258

## 2024-06-18 PROCEDURE — 6360000002 HC RX W HCPCS: Performed by: STUDENT IN AN ORGANIZED HEALTH CARE EDUCATION/TRAINING PROGRAM

## 2024-06-18 PROCEDURE — 83735 ASSAY OF MAGNESIUM: CPT

## 2024-06-18 PROCEDURE — 6370000000 HC RX 637 (ALT 250 FOR IP): Performed by: STUDENT IN AN ORGANIZED HEALTH CARE EDUCATION/TRAINING PROGRAM

## 2024-06-18 PROCEDURE — 84145 PROCALCITONIN (PCT): CPT

## 2024-06-18 PROCEDURE — 2580000003 HC RX 258: Performed by: STUDENT IN AN ORGANIZED HEALTH CARE EDUCATION/TRAINING PROGRAM

## 2024-06-18 PROCEDURE — 6360000004 HC RX CONTRAST MEDICATION

## 2024-06-18 PROCEDURE — 82272 OCCULT BLD FECES 1-3 TESTS: CPT

## 2024-06-18 PROCEDURE — 1100000003 HC PRIVATE W/ TELEMETRY

## 2024-06-18 PROCEDURE — 87086 URINE CULTURE/COLONY COUNT: CPT

## 2024-06-18 PROCEDURE — 82962 GLUCOSE BLOOD TEST: CPT

## 2024-06-18 RX ORDER — INSULIN LISPRO 100 [IU]/ML
0-8 INJECTION, SOLUTION INTRAVENOUS; SUBCUTANEOUS
Status: DISCONTINUED | OUTPATIENT
Start: 2024-06-19 | End: 2024-06-20 | Stop reason: HOSPADM

## 2024-06-18 RX ORDER — SODIUM CHLORIDE 9 MG/ML
INJECTION, SOLUTION INTRAVENOUS CONTINUOUS
Status: ACTIVE | OUTPATIENT
Start: 2024-06-18 | End: 2024-06-19

## 2024-06-18 RX ORDER — 0.9 % SODIUM CHLORIDE 0.9 %
1000 INTRAVENOUS SOLUTION INTRAVENOUS ONCE
Status: COMPLETED | OUTPATIENT
Start: 2024-06-18 | End: 2024-06-18

## 2024-06-18 RX ORDER — ACETAMINOPHEN 650 MG/1
650 SUPPOSITORY RECTAL EVERY 6 HOURS PRN
Status: DISCONTINUED | OUTPATIENT
Start: 2024-06-18 | End: 2024-06-20 | Stop reason: HOSPADM

## 2024-06-18 RX ORDER — ENOXAPARIN SODIUM 100 MG/ML
40 INJECTION SUBCUTANEOUS DAILY
Status: DISCONTINUED | OUTPATIENT
Start: 2024-06-18 | End: 2024-06-20 | Stop reason: HOSPADM

## 2024-06-18 RX ORDER — ONDANSETRON 2 MG/ML
4 INJECTION INTRAMUSCULAR; INTRAVENOUS EVERY 6 HOURS PRN
Status: DISCONTINUED | OUTPATIENT
Start: 2024-06-18 | End: 2024-06-20 | Stop reason: HOSPADM

## 2024-06-18 RX ORDER — LISINOPRIL 5 MG/1
10 TABLET ORAL DAILY
Status: DISCONTINUED | OUTPATIENT
Start: 2024-06-18 | End: 2024-06-20 | Stop reason: HOSPADM

## 2024-06-18 RX ORDER — PANTOPRAZOLE SODIUM 40 MG/1
40 TABLET, DELAYED RELEASE ORAL
Status: DISCONTINUED | OUTPATIENT
Start: 2024-06-19 | End: 2024-06-20 | Stop reason: HOSPADM

## 2024-06-18 RX ORDER — INSULIN LISPRO 100 [IU]/ML
0-4 INJECTION, SOLUTION INTRAVENOUS; SUBCUTANEOUS NIGHTLY
Status: DISCONTINUED | OUTPATIENT
Start: 2024-06-18 | End: 2024-06-20 | Stop reason: HOSPADM

## 2024-06-18 RX ORDER — PRIMIDONE 50 MG/1
50 TABLET ORAL 2 TIMES DAILY
Status: DISCONTINUED | OUTPATIENT
Start: 2024-06-18 | End: 2024-06-20 | Stop reason: HOSPADM

## 2024-06-18 RX ORDER — ROSUVASTATIN CALCIUM 5 MG/1
20 TABLET, COATED ORAL NIGHTLY
Status: DISCONTINUED | OUTPATIENT
Start: 2024-06-18 | End: 2024-06-20 | Stop reason: HOSPADM

## 2024-06-18 RX ORDER — SODIUM CHLORIDE 9 MG/ML
INJECTION, SOLUTION INTRAVENOUS PRN
Status: DISCONTINUED | OUTPATIENT
Start: 2024-06-18 | End: 2024-06-20 | Stop reason: HOSPADM

## 2024-06-18 RX ORDER — HYDROCHLOROTHIAZIDE 25 MG/1
25 TABLET ORAL DAILY
Status: DISCONTINUED | OUTPATIENT
Start: 2024-06-18 | End: 2024-06-20 | Stop reason: HOSPADM

## 2024-06-18 RX ORDER — ACETAMINOPHEN 325 MG/1
650 TABLET ORAL EVERY 6 HOURS PRN
Status: DISCONTINUED | OUTPATIENT
Start: 2024-06-18 | End: 2024-06-20 | Stop reason: HOSPADM

## 2024-06-18 RX ORDER — DEXTROSE MONOHYDRATE 100 MG/ML
INJECTION, SOLUTION INTRAVENOUS CONTINUOUS PRN
Status: DISCONTINUED | OUTPATIENT
Start: 2024-06-18 | End: 2024-06-20 | Stop reason: HOSPADM

## 2024-06-18 RX ORDER — LANOLIN ALCOHOL/MO/W.PET/CERES
3 CREAM (GRAM) TOPICAL NIGHTLY PRN
Status: DISCONTINUED | OUTPATIENT
Start: 2024-06-18 | End: 2024-06-20 | Stop reason: HOSPADM

## 2024-06-18 RX ORDER — SODIUM CHLORIDE 0.9 % (FLUSH) 0.9 %
5-40 SYRINGE (ML) INJECTION PRN
Status: DISCONTINUED | OUTPATIENT
Start: 2024-06-18 | End: 2024-06-20 | Stop reason: HOSPADM

## 2024-06-18 RX ORDER — CARVEDILOL 12.5 MG/1
12.5 TABLET ORAL 2 TIMES DAILY
Status: DISCONTINUED | OUTPATIENT
Start: 2024-06-18 | End: 2024-06-20 | Stop reason: HOSPADM

## 2024-06-18 RX ORDER — SODIUM CHLORIDE 0.9 % (FLUSH) 0.9 %
5-40 SYRINGE (ML) INJECTION EVERY 12 HOURS SCHEDULED
Status: DISCONTINUED | OUTPATIENT
Start: 2024-06-18 | End: 2024-06-20 | Stop reason: HOSPADM

## 2024-06-18 RX ORDER — INSULIN GLARGINE 100 [IU]/ML
30 INJECTION, SOLUTION SUBCUTANEOUS 2 TIMES DAILY
Status: DISCONTINUED | OUTPATIENT
Start: 2024-06-18 | End: 2024-06-20 | Stop reason: HOSPADM

## 2024-06-18 RX ORDER — POLYETHYLENE GLYCOL 3350 17 G/17G
17 POWDER, FOR SOLUTION ORAL DAILY PRN
Status: DISCONTINUED | OUTPATIENT
Start: 2024-06-18 | End: 2024-06-20 | Stop reason: HOSPADM

## 2024-06-18 RX ORDER — GLUCAGON 1 MG/ML
1 KIT INJECTION PRN
Status: DISCONTINUED | OUTPATIENT
Start: 2024-06-18 | End: 2024-06-20 | Stop reason: HOSPADM

## 2024-06-18 RX ORDER — ONDANSETRON 4 MG/1
4 TABLET, ORALLY DISINTEGRATING ORAL EVERY 8 HOURS PRN
Status: DISCONTINUED | OUTPATIENT
Start: 2024-06-18 | End: 2024-06-20 | Stop reason: HOSPADM

## 2024-06-18 RX ORDER — MONTELUKAST SODIUM 10 MG/1
10 TABLET ORAL NIGHTLY
Status: DISCONTINUED | OUTPATIENT
Start: 2024-06-18 | End: 2024-06-20 | Stop reason: HOSPADM

## 2024-06-18 RX ADMIN — HYDROCHLOROTHIAZIDE 25 MG: 25 TABLET ORAL at 22:26

## 2024-06-18 RX ADMIN — SODIUM CHLORIDE 1000 MG: 900 INJECTION INTRAVENOUS at 21:12

## 2024-06-18 RX ADMIN — MONTELUKAST 10 MG: 10 TABLET, FILM COATED ORAL at 22:18

## 2024-06-18 RX ADMIN — PRIMIDONE 50 MG: 50 TABLET ORAL at 22:18

## 2024-06-18 RX ADMIN — INSULIN GLARGINE 30 UNITS: 100 INJECTION, SOLUTION SUBCUTANEOUS at 22:56

## 2024-06-18 RX ADMIN — SODIUM CHLORIDE 1000 ML: 9 INJECTION, SOLUTION INTRAVENOUS at 22:28

## 2024-06-18 RX ADMIN — SODIUM CHLORIDE 1000 ML: 9 INJECTION, SOLUTION INTRAVENOUS at 18:09

## 2024-06-18 RX ADMIN — IOPAMIDOL 100 ML: 755 INJECTION, SOLUTION INTRAVENOUS at 17:32

## 2024-06-18 RX ADMIN — LISINOPRIL 10 MG: 5 TABLET ORAL at 22:25

## 2024-06-18 RX ADMIN — SODIUM CHLORIDE: 9 INJECTION, SOLUTION INTRAVENOUS at 21:10

## 2024-06-18 RX ADMIN — CARVEDILOL 12.5 MG: 12.5 TABLET, FILM COATED ORAL at 22:19

## 2024-06-18 RX ADMIN — ENOXAPARIN SODIUM 40 MG: 100 INJECTION SUBCUTANEOUS at 21:13

## 2024-06-18 RX ADMIN — ROSUVASTATIN CALCIUM 20 MG: 5 TABLET, COATED ORAL at 22:19

## 2024-06-18 ASSESSMENT — PAIN DESCRIPTION - ORIENTATION: ORIENTATION: RIGHT;LEFT

## 2024-06-18 ASSESSMENT — PAIN SCALES - GENERAL
PAINLEVEL_OUTOF10: 0
PAINLEVEL_OUTOF10: 5

## 2024-06-18 ASSESSMENT — PAIN DESCRIPTION - LOCATION: LOCATION: BACK;LEG

## 2024-06-18 NOTE — ED PROVIDER NOTES
EMERGENCY DEPARTMENT ENCOUNTER       Pt Name: Siomara Collins  MRN: [id number]  Birthdate 1945  Date of evaluation: 6/18/2024  Provider: Narda Small PA-C   PCP: Irvin Vang MD  Note Started: 3:20 PM EDT 6/18/24     CHIEF COMPLAINT       Chief Complaint   Patient presents with    Dysuria     Patient wheeled into triage with c/o dysuria, frequency x 4 months. Pt reports 2 hospital stays requiring IV ABX for complicated UTI. Patient also endorses blood when she wipes, but unsure where it is coming from.      HISTORY OF PRESENT ILLNESS: 1 or more elements      History From: Patient  HPI Limitations: None     Siomara Collins is a 79 y.o. female who presents to the ED via family in wheelchair with complaints of dysuria due to recurrent UTI since January.  Patient has been hospitalized twice with UTI and culture grown Klebsiella.  Patient has been taking Monurol 1 time a week for the past few weeks and states PCP retested urinalysis and stated it was still positive and recommended she return to the hospital for IV antibiotics.  Patient also endorsing hematuria that started yesterday and states that there is a lot of blood when wiping every time she goes to the bathroom unsure if it is vaginal, urethral, rectal.  States it is bright red blood.  Patient also endorses pain from left lower quadrant that radiates into the right lower quadrant.  Patient states she has been using A&E ointment to help with the pain around her rectum as well has tried Imodium last night due to diarrhea 4 times a day for the past 2 days.  Patient has not had a bowel movement today.  Past medical history pertinent for chronic back pain, GERD, interstitial lung disease, peripheral neuropathy from the waist down and now progressing into her hands.  Patient with history of multiple vertebral surgeries and abdominal flap 40 years ago.    Reviewed PMH, PSH, medications, allergies with patient.      Nursing Notes were all reviewed and

## 2024-06-19 LAB
ANION GAP SERPL CALC-SCNC: 4 MMOL/L (ref 5–15)
BASOPHILS # BLD: 0 K/UL (ref 0–0.1)
BASOPHILS NFR BLD: 1 % (ref 0–1)
BUN SERPL-MCNC: 22 MG/DL (ref 6–20)
BUN/CREAT SERPL: 35 (ref 12–20)
CALCIUM SERPL-MCNC: 8.4 MG/DL (ref 8.5–10.1)
CHLORIDE SERPL-SCNC: 108 MMOL/L (ref 97–108)
CO2 SERPL-SCNC: 28 MMOL/L (ref 21–32)
CREAT SERPL-MCNC: 0.63 MG/DL (ref 0.55–1.02)
DIFFERENTIAL METHOD BLD: ABNORMAL
EOSINOPHIL # BLD: 0.1 K/UL (ref 0–0.4)
EOSINOPHIL NFR BLD: 1 % (ref 0–7)
ERYTHROCYTE [DISTWIDTH] IN BLOOD BY AUTOMATED COUNT: 13.4 % (ref 11.5–14.5)
GLUCOSE BLD STRIP.AUTO-MCNC: 145 MG/DL (ref 65–117)
GLUCOSE BLD STRIP.AUTO-MCNC: 198 MG/DL (ref 65–117)
GLUCOSE BLD STRIP.AUTO-MCNC: 225 MG/DL (ref 65–117)
GLUCOSE BLD STRIP.AUTO-MCNC: 254 MG/DL (ref 65–117)
GLUCOSE SERPL-MCNC: 300 MG/DL (ref 65–100)
HCT VFR BLD AUTO: 37.8 % (ref 35–47)
HGB BLD-MCNC: 12.7 G/DL (ref 11.5–16)
IMM GRANULOCYTES # BLD AUTO: 0 K/UL (ref 0–0.04)
IMM GRANULOCYTES NFR BLD AUTO: 1 % (ref 0–0.5)
LACTATE SERPL-SCNC: 1.9 MMOL/L (ref 0.4–2)
LYMPHOCYTES # BLD: 1.8 K/UL (ref 0.8–3.5)
LYMPHOCYTES NFR BLD: 28 % (ref 12–49)
MCH RBC QN AUTO: 34.7 PG (ref 26–34)
MCHC RBC AUTO-ENTMCNC: 33.6 G/DL (ref 30–36.5)
MCV RBC AUTO: 103.3 FL (ref 80–99)
MONOCYTES # BLD: 0.4 K/UL (ref 0–1)
MONOCYTES NFR BLD: 7 % (ref 5–13)
NEUTS SEG # BLD: 4.2 K/UL (ref 1.8–8)
NEUTS SEG NFR BLD: 62 % (ref 32–75)
NRBC # BLD: 0 K/UL (ref 0–0.01)
NRBC BLD-RTO: 0 PER 100 WBC
PLATELET # BLD AUTO: 169 K/UL (ref 150–400)
PMV BLD AUTO: 10.1 FL (ref 8.9–12.9)
POTASSIUM SERPL-SCNC: 3.6 MMOL/L (ref 3.5–5.1)
RBC # BLD AUTO: 3.66 M/UL (ref 3.8–5.2)
SERVICE CMNT-IMP: ABNORMAL
SODIUM SERPL-SCNC: 140 MMOL/L (ref 136–145)
WBC # BLD AUTO: 6.6 K/UL (ref 3.6–11)

## 2024-06-19 PROCEDURE — 85025 COMPLETE CBC W/AUTO DIFF WBC: CPT

## 2024-06-19 PROCEDURE — 6370000000 HC RX 637 (ALT 250 FOR IP): Performed by: STUDENT IN AN ORGANIZED HEALTH CARE EDUCATION/TRAINING PROGRAM

## 2024-06-19 PROCEDURE — 36415 COLL VENOUS BLD VENIPUNCTURE: CPT

## 2024-06-19 PROCEDURE — 6360000002 HC RX W HCPCS: Performed by: STUDENT IN AN ORGANIZED HEALTH CARE EDUCATION/TRAINING PROGRAM

## 2024-06-19 PROCEDURE — 6370000000 HC RX 637 (ALT 250 FOR IP): Performed by: INTERNAL MEDICINE

## 2024-06-19 PROCEDURE — 80048 BASIC METABOLIC PNL TOTAL CA: CPT

## 2024-06-19 PROCEDURE — 2580000003 HC RX 258: Performed by: STUDENT IN AN ORGANIZED HEALTH CARE EDUCATION/TRAINING PROGRAM

## 2024-06-19 PROCEDURE — 82962 GLUCOSE BLOOD TEST: CPT

## 2024-06-19 PROCEDURE — 1100000003 HC PRIVATE W/ TELEMETRY

## 2024-06-19 RX ORDER — LIDOCAINE 4 G/G
1 PATCH TOPICAL DAILY
Status: DISCONTINUED | OUTPATIENT
Start: 2024-06-19 | End: 2024-06-20 | Stop reason: HOSPADM

## 2024-06-19 RX ORDER — GABAPENTIN 100 MG/1
100 CAPSULE ORAL 2 TIMES DAILY
Status: DISCONTINUED | OUTPATIENT
Start: 2024-06-19 | End: 2024-06-20 | Stop reason: HOSPADM

## 2024-06-19 RX ADMIN — PRIMIDONE 50 MG: 50 TABLET ORAL at 09:46

## 2024-06-19 RX ADMIN — PANTOPRAZOLE SODIUM 40 MG: 40 TABLET, DELAYED RELEASE ORAL at 09:52

## 2024-06-19 RX ADMIN — ENOXAPARIN SODIUM 40 MG: 100 INJECTION SUBCUTANEOUS at 09:45

## 2024-06-19 RX ADMIN — SODIUM CHLORIDE, PRESERVATIVE FREE 10 ML: 5 INJECTION INTRAVENOUS at 09:47

## 2024-06-19 RX ADMIN — MONTELUKAST 10 MG: 10 TABLET, FILM COATED ORAL at 21:22

## 2024-06-19 RX ADMIN — SODIUM CHLORIDE, PRESERVATIVE FREE 5 ML: 5 INJECTION INTRAVENOUS at 21:22

## 2024-06-19 RX ADMIN — PRIMIDONE 50 MG: 50 TABLET ORAL at 21:22

## 2024-06-19 RX ADMIN — GABAPENTIN 100 MG: 100 CAPSULE ORAL at 13:24

## 2024-06-19 RX ADMIN — INSULIN GLARGINE 30 UNITS: 100 INJECTION, SOLUTION SUBCUTANEOUS at 22:21

## 2024-06-19 RX ADMIN — DICLOFENAC SODIUM 2 G: 10 GEL TOPICAL at 13:25

## 2024-06-19 RX ADMIN — GABAPENTIN 100 MG: 100 CAPSULE ORAL at 21:22

## 2024-06-19 RX ADMIN — INSULIN GLARGINE 30 UNITS: 100 INJECTION, SOLUTION SUBCUTANEOUS at 09:45

## 2024-06-19 RX ADMIN — DICLOFENAC SODIUM 2 G: 10 GEL TOPICAL at 21:22

## 2024-06-19 RX ADMIN — ROSUVASTATIN CALCIUM 20 MG: 5 TABLET, COATED ORAL at 21:22

## 2024-06-19 RX ADMIN — LISINOPRIL 10 MG: 5 TABLET ORAL at 09:45

## 2024-06-19 RX ADMIN — INSULIN LISPRO 4 UNITS: 100 INJECTION, SOLUTION INTRAVENOUS; SUBCUTANEOUS at 16:34

## 2024-06-19 RX ADMIN — PREDNISONE 30 MG: 20 TABLET ORAL at 09:45

## 2024-06-19 RX ADMIN — SODIUM CHLORIDE 1000 MG: 900 INJECTION INTRAVENOUS at 21:14

## 2024-06-19 RX ADMIN — HYDROCHLOROTHIAZIDE 25 MG: 25 TABLET ORAL at 09:45

## 2024-06-19 RX ADMIN — CARVEDILOL 12.5 MG: 12.5 TABLET, FILM COATED ORAL at 09:46

## 2024-06-19 ASSESSMENT — PAIN DESCRIPTION - DESCRIPTORS: DESCRIPTORS: ACHING

## 2024-06-19 ASSESSMENT — PAIN DESCRIPTION - ORIENTATION: ORIENTATION: LEFT;RIGHT

## 2024-06-19 ASSESSMENT — PAIN SCALES - GENERAL
PAINLEVEL_OUTOF10: 5
PAINLEVEL_OUTOF10: 5

## 2024-06-19 ASSESSMENT — PAIN DESCRIPTION - LOCATION: LOCATION: KNEE

## 2024-06-19 NOTE — H&P
No acute abnormality of the abdomen or pelvis    2. Extensive thoracolumbar fusion. Loosening of the bilateral S1 screws    Electronically signed by Atul Fraire        Xray Result (most recent):  XR CHEST LIMITED ONE VIEW 04/22/2024    Narrative  INDICATION: Pneumonia, unspecified organism single    EXAM: CXR 1 view    FINDINGS: AP seated view of the chest shows low lung volumes without apparent  consolidation or edema. Heart size is normal. There is no pneumothorax or  apparent pleural effusion. There is no significant change since 4/5/2024.    Impression  No acute finding.        _______________________________________________________________________    TOTAL TIME:  75 Minutes   TOBACCO CESSATION COUNSELING: No    Critical Care Provided: Not applicable (Minutes non procedure based)        Signed:Waylon Keller DO  [facility] - Internal Medicine Hospitalist/ Nocturnist  6/18/2024 8:15 PM    Please do not hesitate to reach out to myself or assigned provider on-call via [employer].ing system with questions or concerns.     Procedures: see electronic medical records for all procedures/Xrays and details which were not copied into this note but were reviewed prior to creation of Plan.

## 2024-06-20 ENCOUNTER — TELEPHONE (OUTPATIENT)
Facility: CLINIC | Age: 79
End: 2024-06-20

## 2024-06-20 VITALS
BODY MASS INDEX: 36.93 KG/M2 | OXYGEN SATURATION: 95 % | WEIGHT: 208.4 LBS | HEIGHT: 63 IN | SYSTOLIC BLOOD PRESSURE: 140 MMHG | HEART RATE: 70 BPM | RESPIRATION RATE: 16 BRPM | TEMPERATURE: 98.8 F | DIASTOLIC BLOOD PRESSURE: 71 MMHG

## 2024-06-20 LAB
ANION GAP SERPL CALC-SCNC: 4 MMOL/L (ref 5–15)
BASOPHILS # BLD: 0 K/UL (ref 0–0.1)
BASOPHILS NFR BLD: 0 % (ref 0–1)
BUN SERPL-MCNC: 16 MG/DL (ref 6–20)
BUN/CREAT SERPL: 27 (ref 12–20)
CALCIUM SERPL-MCNC: 8.5 MG/DL (ref 8.5–10.1)
CHLORIDE SERPL-SCNC: 107 MMOL/L (ref 97–108)
CO2 SERPL-SCNC: 29 MMOL/L (ref 21–32)
CREAT SERPL-MCNC: 0.6 MG/DL (ref 0.55–1.02)
DIFFERENTIAL METHOD BLD: ABNORMAL
EOSINOPHIL # BLD: 0.1 K/UL (ref 0–0.4)
EOSINOPHIL NFR BLD: 1 % (ref 0–7)
ERYTHROCYTE [DISTWIDTH] IN BLOOD BY AUTOMATED COUNT: 13.3 % (ref 11.5–14.5)
GLUCOSE BLD STRIP.AUTO-MCNC: 145 MG/DL (ref 65–117)
GLUCOSE BLD STRIP.AUTO-MCNC: 163 MG/DL (ref 65–117)
GLUCOSE SERPL-MCNC: 157 MG/DL (ref 65–100)
HCT VFR BLD AUTO: 38.3 % (ref 35–47)
HGB BLD-MCNC: 13.2 G/DL (ref 11.5–16)
IMM GRANULOCYTES # BLD AUTO: 0.1 K/UL (ref 0–0.04)
IMM GRANULOCYTES NFR BLD AUTO: 1 % (ref 0–0.5)
LYMPHOCYTES # BLD: 2 K/UL (ref 0.8–3.5)
LYMPHOCYTES NFR BLD: 30 % (ref 12–49)
MCH RBC QN AUTO: 34 PG (ref 26–34)
MCHC RBC AUTO-ENTMCNC: 34.5 G/DL (ref 30–36.5)
MCV RBC AUTO: 98.7 FL (ref 80–99)
MONOCYTES # BLD: 0.5 K/UL (ref 0–1)
MONOCYTES NFR BLD: 7 % (ref 5–13)
NEUTS SEG # BLD: 4.2 K/UL (ref 1.8–8)
NEUTS SEG NFR BLD: 62 % (ref 32–75)
NRBC # BLD: 0 K/UL (ref 0–0.01)
NRBC BLD-RTO: 0 PER 100 WBC
PLATELET # BLD AUTO: 178 K/UL (ref 150–400)
PMV BLD AUTO: 9.7 FL (ref 8.9–12.9)
POTASSIUM SERPL-SCNC: 3.4 MMOL/L (ref 3.5–5.1)
RBC # BLD AUTO: 3.88 M/UL (ref 3.8–5.2)
SERVICE CMNT-IMP: ABNORMAL
SERVICE CMNT-IMP: ABNORMAL
SODIUM SERPL-SCNC: 140 MMOL/L (ref 136–145)
WBC # BLD AUTO: 6.9 K/UL (ref 3.6–11)

## 2024-06-20 PROCEDURE — 80048 BASIC METABOLIC PNL TOTAL CA: CPT

## 2024-06-20 PROCEDURE — 36415 COLL VENOUS BLD VENIPUNCTURE: CPT

## 2024-06-20 PROCEDURE — 85025 COMPLETE CBC W/AUTO DIFF WBC: CPT

## 2024-06-20 PROCEDURE — 6370000000 HC RX 637 (ALT 250 FOR IP): Performed by: STUDENT IN AN ORGANIZED HEALTH CARE EDUCATION/TRAINING PROGRAM

## 2024-06-20 PROCEDURE — 2580000003 HC RX 258: Performed by: STUDENT IN AN ORGANIZED HEALTH CARE EDUCATION/TRAINING PROGRAM

## 2024-06-20 PROCEDURE — 6370000000 HC RX 637 (ALT 250 FOR IP): Performed by: INTERNAL MEDICINE

## 2024-06-20 PROCEDURE — 6360000002 HC RX W HCPCS: Performed by: STUDENT IN AN ORGANIZED HEALTH CARE EDUCATION/TRAINING PROGRAM

## 2024-06-20 PROCEDURE — 82962 GLUCOSE BLOOD TEST: CPT

## 2024-06-20 RX ORDER — GABAPENTIN 100 MG/1
CAPSULE ORAL
Qty: 20 CAPSULE | Refills: 0 | Status: SHIPPED | OUTPATIENT
Start: 2024-06-20 | End: 2024-07-27

## 2024-06-20 RX ORDER — CEFDINIR 300 MG/1
300 CAPSULE ORAL 2 TIMES DAILY
Qty: 10 CAPSULE | Refills: 0 | Status: SHIPPED | OUTPATIENT
Start: 2024-06-20 | End: 2024-06-25

## 2024-06-20 RX ORDER — POTASSIUM CHLORIDE 750 MG/1
40 TABLET, FILM COATED, EXTENDED RELEASE ORAL ONCE
Status: COMPLETED | OUTPATIENT
Start: 2024-06-20 | End: 2024-06-20

## 2024-06-20 RX ADMIN — SODIUM CHLORIDE, PRESERVATIVE FREE 10 ML: 5 INJECTION INTRAVENOUS at 08:43

## 2024-06-20 RX ADMIN — PREDNISONE 30 MG: 20 TABLET ORAL at 08:43

## 2024-06-20 RX ADMIN — PANTOPRAZOLE SODIUM 40 MG: 40 TABLET, DELAYED RELEASE ORAL at 08:43

## 2024-06-20 RX ADMIN — DICLOFENAC SODIUM 2 G: 10 GEL TOPICAL at 08:44

## 2024-06-20 RX ADMIN — PRIMIDONE 50 MG: 50 TABLET ORAL at 08:43

## 2024-06-20 RX ADMIN — POTASSIUM CHLORIDE 40 MEQ: 750 TABLET, FILM COATED, EXTENDED RELEASE ORAL at 08:43

## 2024-06-20 RX ADMIN — ENOXAPARIN SODIUM 40 MG: 100 INJECTION SUBCUTANEOUS at 08:42

## 2024-06-20 RX ADMIN — HYDROCHLOROTHIAZIDE 25 MG: 25 TABLET ORAL at 08:43

## 2024-06-20 RX ADMIN — INSULIN GLARGINE 30 UNITS: 100 INJECTION, SOLUTION SUBCUTANEOUS at 08:42

## 2024-06-20 RX ADMIN — LISINOPRIL 10 MG: 5 TABLET ORAL at 08:43

## 2024-06-20 RX ADMIN — CARVEDILOL 12.5 MG: 12.5 TABLET, FILM COATED ORAL at 08:43

## 2024-06-20 RX ADMIN — GABAPENTIN 100 MG: 100 CAPSULE ORAL at 08:43

## 2024-06-20 NOTE — PLAN OF CARE
Problem: Discharge Planning  Goal: Discharge to home or other facility with appropriate resources  6/20/2024 1213 by Jo, Yeong Ae Jenny, RN  Outcome: Adequate for Discharge  6/20/2024 1106 by Jo, Yeong Ae Jenny, RN  Outcome: Adequate for Discharge  6/20/2024 0539 by Vanessa Hernandez RN  Outcome: Progressing  Flowsheets (Taken 6/20/2024 0539)  Discharge to home or other facility with appropriate resources: Identify barriers to discharge with patient and caregiver     Problem: Pain  Goal: Verbalizes/displays adequate comfort level or baseline comfort level  6/20/2024 1213 by Jo, Yeong Ae Jenny, RN  Outcome: Adequate for Discharge  6/20/2024 1106 by Jo, Yeong Ae Jenny, RN  Outcome: Adequate for Discharge  6/20/2024 0539 by Vanessa Hernandez RN  Outcome: Progressing  Flowsheets (Taken 6/20/2024 0539)  Verbalizes/displays adequate comfort level or baseline comfort level:   Encourage patient to monitor pain and request assistance   Assess pain using appropriate pain scale     Problem: Safety - Adult  Goal: Free from fall injury  6/20/2024 1213 by Jo, Yeong Ae Jenny, RN  Outcome: Adequate for Discharge  6/20/2024 1106 by Jo, Yeong Ae Jenny, RN  Outcome: Adequate for Discharge  6/20/2024 0539 by Vanessa Hernandez RN  Outcome: Progressing  Flowsheets (Taken 6/20/2024 0539)  Free From Fall Injury: Instruct family/caregiver on patient safety     Problem: Chronic Conditions and Co-morbidities  Goal: Patient's chronic conditions and co-morbidity symptoms are monitored and maintained or improved  6/20/2024 1213 by Jo, Yeong Ae Jenny, RN  Outcome: Adequate for Discharge  6/20/2024 1106 by Jo, Yeong Ae Jenny, RN  Outcome: Adequate for Discharge     Problem: Skin/Tissue Integrity  Goal: Absence of new skin breakdown  Description: 1.  Monitor for areas of redness and/or skin breakdown  2.  Assess vascular access sites hourly  3.  Every 4-6 hours minimum:  Change oxygen saturation probe site  4.  Every 4-6 hours:  If on nasal continuous  positive airway pressure, respiratory therapy assess nares and determine need for appliance change or resting period.  6/20/2024 1213 by Jo, Yeong Ae Jenny, YUDELKA  Outcome: Adequate for Discharge  6/20/2024 1106 by Jo, Yeong Ae Jenny, YUDELKA  Outcome: Adequate for Discharge  6/20/2024 0539 by Vanessa Hernandez, RN  Outcome: Progressing  Note: Monitor skin for breakdown. Incontinence checks. Reposition.

## 2024-06-20 NOTE — PLAN OF CARE
Problem: Discharge Planning  Goal: Discharge to home or other facility with appropriate resources  Outcome: Progressing  Flowsheets (Taken 6/20/2024 0539)  Discharge to home or other facility with appropriate resources: Identify barriers to discharge with patient and caregiver     Problem: Pain  Goal: Verbalizes/displays adequate comfort level or baseline comfort level  Outcome: Progressing  Flowsheets (Taken 6/20/2024 0539)  Verbalizes/displays adequate comfort level or baseline comfort level:   Encourage patient to monitor pain and request assistance   Assess pain using appropriate pain scale     Problem: Safety - Adult  Goal: Free from fall injury  Outcome: Progressing  Flowsheets (Taken 6/20/2024 0539)  Free From Fall Injury: Instruct family/caregiver on patient safety     Problem: Skin/Tissue Integrity  Goal: Absence of new skin breakdown  Description: 1.  Monitor for areas of redness and/or skin breakdown  2.  Assess vascular access sites hourly  3.  Every 4-6 hours minimum:  Change oxygen saturation probe site  4.  Every 4-6 hours:  If on nasal continuous positive airway pressure, respiratory therapy assess nares and determine need for appliance change or resting period.  Outcome: Progressing  Note: Monitor skin for breakdown. Incontinence checks. Reposition.

## 2024-06-20 NOTE — PLAN OF CARE
Problem: Discharge Planning  Goal: Discharge to home or other facility with appropriate resources  6/20/2024 1106 by Jo, Yeong Ae Jenny, RN  Outcome: Adequate for Discharge  6/20/2024 0539 by Vanessa Hernandez RN  Outcome: Progressing  Flowsheets (Taken 6/20/2024 0539)  Discharge to home or other facility with appropriate resources: Identify barriers to discharge with patient and caregiver     Problem: Pain  Goal: Verbalizes/displays adequate comfort level or baseline comfort level  6/20/2024 1106 by Jo, Yeong Ae Jenny, RN  Outcome: Adequate for Discharge  6/20/2024 0539 by Vanessa Hernandez RN  Outcome: Progressing  Flowsheets (Taken 6/20/2024 0539)  Verbalizes/displays adequate comfort level or baseline comfort level:   Encourage patient to monitor pain and request assistance   Assess pain using appropriate pain scale     Problem: Safety - Adult  Goal: Free from fall injury  6/20/2024 1106 by Jo, Yeong Ae Jenny, RN  Outcome: Adequate for Discharge  6/20/2024 0539 by Vanessa Hernandez RN  Outcome: Progressing  Flowsheets (Taken 6/20/2024 0539)  Free From Fall Injury: Instruct family/caregiver on patient safety     Problem: Chronic Conditions and Co-morbidities  Goal: Patient's chronic conditions and co-morbidity symptoms are monitored and maintained or improved  Outcome: Adequate for Discharge     Problem: Skin/Tissue Integrity  Goal: Absence of new skin breakdown  Description: 1.  Monitor for areas of redness and/or skin breakdown  2.  Assess vascular access sites hourly  3.  Every 4-6 hours minimum:  Change oxygen saturation probe site  4.  Every 4-6 hours:  If on nasal continuous positive airway pressure, respiratory therapy assess nares and determine need for appliance change or resting period.  6/20/2024 1106 by Jo, Yeong Ae Jenny, RN  Outcome: Adequate for Discharge  6/20/2024 0539 by Vanessa Hernandez RN  Outcome: Progressing  Note: Monitor skin for breakdown. Incontinence checks. Reposition.

## 2024-06-20 NOTE — DISCHARGE INSTRUCTIONS
Patient Discharge Instructions    Siomara Collins / [id number] : 1945    Admitted 2024 Discharged: 2024         DISCHARGE DIAGNOSIS:   Urinary tract infection  Recent hospitalization - for sepsis secondary to pneumonia/colitis/UTI  -COVID + parainfluenza positive, completed course of vancomycin => cefdinir   Lactic acidosis   Insulin-dependent diabetes mellitus with hyperglycemia  Diabetic neuropathy  Borderline hypomagnesemia  Essential hypertension  Hyperlipidemia  CKD 3, at baseline  Tremor  Polymyalgia rheumatica  Chronic steroids  Bilateral S1 screw loosening  Obesity class I by BMI 34.28      Take Home Medications     {Medication reconciliation information is now added to the patient's AVS automatically when it is printed.  There is no need to use this SmartLink in discharge instructions.  Highlight this text and delete it to clear this message}      General drug facts     If you have a very bad allergy, wear an allergy ID at all times.   It is important that you take the medication exactly as they are prescribed.   Keep your medication in the bottles provided by the pharmacist.  Keep a list of all your drugs (prescription, natural products, vitamins, OTC) with you. Give this list to your doctor.  Do not take other medications without consulting your doctor.    Do not share your drugs with others and do not take anyone else's drugs.   Keep all drugs out of the reach of children and pets.    Most drugs may be thrown away in household trash after mixing with coffee grounds or sharon litter and sealing in a plastic bag.    Keep a list Call your doctor for help with any side effects. If in the [place] you may also call the FDA at 2-773-HTU-8062    Talk with the doctor before starting any new drug, including OTC, natural products, or vitamins.        What to do at Home    1. Recommended diet: Regular     2. Recommended activity: activity as tolerated    3. If you experience any of the  following symptoms then please call your primary care physician or return to the emergency room if you cannot get hold of your doctor:    4. Wound Care: none    5. Lab work: cbc and bmp in 1 week     6.Bring these papers with you to your follow up appointments. The papers will help your doctors be sure to continue the care plan from the hospital.      If you have questions regarding the hospital related prescriptions or hospital related issues please call [facility] at . You can always direct your questions to your primary care doctor if you are unable to reach your hospital physician; your PCP works as an extension of your hospital doctor just like your hospital doctor is an extension of your PCP for your time at the hospital ([facility])      Follow-up with:   PCP: @PCP@  Irvin Vang MD  [address] [facility] [address] [phone]    Schedule an appointment as soon as possible for a visit in 2 day(s)  To schedule your PCP hospital follow up.    [facility] [address]  [address] [place] [address]  [phone]  Follow up  As needed - [facility] is a mobile urgent care provider that comes to your home. You may call them if you would like to set up an appt to be seen for a follow up while waiting to be seen by your PCP.       Please call for your own appointment        Information obtained by :  I understand that if any problems occur once I am at home I am to contact my physician.    I understand and acknowledge receipt of the instructions indicated above.                                                                                                                                           Physician's or R.N.'s Signature                                                                  Date/Time                                                                                                                                              Patient or

## 2024-06-20 NOTE — DISCHARGE SUMMARY
Discharge Summary    Name: Siomara Collins  [id number]  YOB: 1945 (Age: 79 y.o.)   Date of Admission: 6/18/2024  Date of Discharge: 6/20/2024  Attending Physician: Beto Schneider MD    Discharge Diagnosis:     Urinary tract infection  Recent hospitalization 4/5-4/14 for sepsis secondary to pneumonia/colitis/UTI  -COVID + parainfluenza positive, completed course of vancomycin => cefdinir   Lactic acidosis   Insulin-dependent diabetes mellitus with hyperglycemia  Diabetic neuropathy  Borderline hypomagnesemia  Essential hypertension  Hyperlipidemia  CKD 3, at baseline  Tremor  Polymyalgia rheumatica  Chronic steroids  Bilateral S1 screw loosening  Obesity class I by BMI 34.28    Consultations:  IP CONSULT TO HOSPITALIST      Brief Admission History/Reason for Admission Per Waylon Keller, DO:   Siomara Collins is a 79 y.o.  female with PMHx as listed below presenting to the emergency department at direction of PCP/facility with concern for urinary frequency in setting of recent hospitalization for sepsis secondary to parainfluenza/COVID pneumonia plus UTI status post course of vancomycin/cefdinir.  Reports that she was seen at her PCPs office and UA was performed to ensure clearance of UTI which was suggestive of ongoing infection with 2 organisms growing greater than 100,000 colonies both separate strains of Klebsiella pneumoniae.  Patient has multiple antibiotic allergies and PCP did not feel oral antibiotics outside of fosfomycin were available so she was directed to emergency department for evaluation.  ROS otherwise negative.  Denies tobacco, alcohol, illicit drugs.  On my exam, patient denies dysuria, lower abdominal pain, fever/chills/shakes.  Reports urinary frequency which she notes is worse when her blood sugars are running higher -no recent increase in PTA prednisone dose for polymyalgia rheumatica.     Brief Hospital Course by Main Problems:     Urinary  tablet  Commonly known as: HYDRODIURIL     insulin lispro 100 UNIT/ML Soln injection vial  Commonly known as: HUMALOG,ADMELOG     lidocaine 4 % external patch  Commonly known as: HM Lidocaine Patch  Place 1 patch onto the skin daily     lisinopril 10 MG tablet  Commonly known as: PRINIVIL;ZESTRIL     loperamide 2 MG capsule  Commonly known as: IMODIUM     montelukast 10 MG tablet  Commonly known as: SINGULAIR     omeprazole 20 MG delayed release capsule  Commonly known as: PRILOSEC  TAKE ONE CAPSULE BY MOUTH IN THE MORNING FOR GERD     polyethylene glycol 17 g packet  Commonly known as: GLYCOLAX     predniSONE 10 MG tablet  Commonly known as: DELTASONE  Take 3 tablets daily     primidone 50 MG tablet  Commonly known as: MYSOLINE  TAKE ONE TABLET BY MOUTH 2 TIMES A DAY FOR TREMORS     rosuvastatin 20 MG tablet  Commonly known as: Crestor  Take 1 tablet by mouth nightly     vitamin D3 25 MCG (1000 UT) Tabs tablet  Commonly known as: CHOLECALCIFEROL  Take 1 tablet by mouth daily            STOP taking these medications      fosfomycin tromethamine 3 g Pack  Commonly known as: MONUROL               Where to Get Your Medications        These medications were sent to [facility] [place], [place] - [address] [facility] [phone] - F [phone] [address] [facility] [address]      Phone: [phone]   cefdinir 300 MG capsule       Information about where to get these medications is not yet available    Ask your nurse or doctor about these medications  gabapentin 100 MG capsule             DISPOSITION:    Home with Family:       Home with HH/PT/OT/RN:    SNF/LTC: Y   MONIKA:    OTHER:            Code status: DNR    1. Recommended diet: Regular     2. Recommended activity: activity as tolerated    3. If you experience any of the following symptoms then please call your primary care physician or return to the emergency room if you cannot get hold of your doctor:    4. Wound

## 2024-06-20 NOTE — CARE COORDINATION
Pt is clear from CM standpoint for d/c.    [facility] will be 1:45 pm.     Report number to [facility] is [phone].    Care Management Initial Assessment       RUR: 22%  Readmission? No  1st IM letter given? Yes - 6/19/24  1st  letter given: No         06/20/24 1112   Service Assessment   Patient Orientation Alert and Oriented   Cognition Alert   History Provided By Patient;Medical Record   Primary Caregiver   ([facility])   Support Systems Children;Other (Comment)  (The staff at the [facility])   Patient's Healthcare Decision Maker is: Named in Scanned ACP Document   PCP Verified by CM Yes   Last Visit to PCP Within last 6 months   Prior Functional Level Assistance with the following:;Bathing;Dressing;Toileting;Cooking;Housework;Shopping;Mobility   Current Functional Level Assistance with the following:;Bathing;Dressing;Toileting;Cooking;Housework;Shopping;Mobility   Can patient return to prior living arrangement Yes   Family able to assist with home care needs: No   Would you like for me to discuss the discharge plan with any other family members/significant others, and if so, who? Yes  (Pt's son)   Financial Resources Medicare   Community Resources Assisted Living   Social/Functional History   Lives With Alone   Type of Home Assisted living   Home Equipment Wheelchair - Manual;Hospital bed   Active  No   Patient's  Info Medical transportation   Discharge Planning   Type of Residence Assisted living   Current Services Prior To Admission Durable Medical Equipment  (Assisted Living)   Patient expects to be discharged to: Assisted living   Services At/After Discharge   Transition of Care Consult (CM Consult) Assisted Living   Services At/After Discharge Assisted living    Resource Information Provided? No   Mode of Transport at Discharge BLS   Confirm Follow Up Transport Self   Condition of Participation: Discharge Planning   The Plan for

## 2024-06-20 NOTE — PROGRESS NOTES
1245: I have reviewed discharge instructions with the patient. The patient verbalized understanding. Discharge medications reviewed with patient and appropriate educational materials and side effects teaching were provided. Follow-up appointments reviewed. Opportunity for questions and clarification was provided. Venous access removed without difficulty. Patient is ready for discharge. Patient is scheduled to be picked up at 1345.    1250: Verbal report given to Pj at [facility]. All questions were answered.    1355: Patient was taken to discharge area by SALONI Marsh.      
1418 - PCP hospital follow-up transitional care appointment has been scheduled with Dr. Irvin Vang on 6/24/24 1400. [facility] placed [facility] information AVS for patient resource.   Pending patient discharge.  Eliane Lopez Care Management Assistant     Attempted to schedule PCP hospital follow up appointment. Unable to reach anyone, left voicemail. [facility] placed [facility] information AVS for patient resource. Pending patient discharge. Eliane Lopez Care Management Assistant  
Called to pt:s room by RN to set up a cpap machine ,rt spoke with pt,and no order and pt. Stated she does not wear a cpap at night.  
End of Shift Note    Bedside shift change report given to Janene (oncoming nurse) by Vanessa Hernandez RN (offgoing nurse).  Report included the following information SBAR    Shift worked:  7p-7a     Shift summary and any significant changes:     No significant events throughout the night.      Concerns for physician to address: Type of antibiotics used     Zone phone for oncoming shift:          Activity:  Level of Assistance: Contact guard assist, steading assist    Cardiac:   Cardiac Monitoring:  yes - SR    Access:  Current line(s): PIV     Genitourinary:   Urinary Status: Voiding, Incontinent, External catheter    Respiratory:   O2 Device: None (Room air)    GI:  Current diet: ADULT DIET; Regular; 4 carb choices (60 gm/meal); Low Fat/Low Chol/High Fiber/HANNY  DIET ONE TIME MESSAGE;    Pain Management:   Patient states pain is manageable on current regimen: YES    Skin:  Kana Scale Score: 15  Interventions: Wound Offloading (Prevention Methods): Bed, pressure redistribution/air, Pillows, Repositioning, Elevate heels  Pressure injury: yes - Sacrum/ heels - R heel red, boggy    Patient Safety:  Fall Score:    Fall Risk Interventions  Nursing Judgement-Fall Risk High(Add Comments): Yes  Toilet Every 2 Hours-In Advance of Need: Yes  Hourly Visual Checks: Awake, In bed  Fall Visual Posted: Armband, Socks, Fall sign posted  Room Door Open: Yes  Alarm On: Bed  Patient Moved Closer to Nursing Station: No    Active Consults:  IP CONSULT TO HOSPITALIST    Length of Stay:  Expected LOS: 2  Actual LOS: 2      Vanessa Hernandez RN  
End of Shift Note    Bedside shift change report given to Liz JHA (oncoming nurse) by Simi Fabian RN (offgoing nurse).  Report included the following information SBAR, Kardex, MAR, Recent Results, and Cardiac Rhythm NSR-SB, 1degree AVB    Shift worked:  7032-4784     Shift summary and any significant changes:     none     Concerns for physician to address:  Pt wants lidocaine patch ordered, states she uses daily.     Zone phone for oncoming shift:   [phone]       Activity:     Number times ambulated in hallways past shift: 0  Number of times OOB to chair past shift: 0    Cardiac:   Cardiac Monitoring: Yes           Access:  Current line(s): PIV     Genitourinary:   Urinary status: incontinent and external catheter    Respiratory:      Chronic home O2 use?: N/A  Incentive spirometer at bedside: N/A       GI:     Current diet:  ADULT DIET; Regular; 4 carb choices (60 gm/meal); Low Fat/Low Chol/High Fiber/HANNY  DIET ONE TIME MESSAGE;  Passing flatus: YES  Tolerating current diet: YES       Pain Management:   Patient states pain is manageable on current regimen: YES    Skin:     Interventions: float heels, increase time out of bed, foam dressing, PT/OT consult, limit briefs, and internal/external urinary devices    Patient Safety:  Fall Score:    Interventions: bed/chair alarm       Length of Stay:  Expected LOS: 5  Actual LOS: 1      Simi Fabian RN                            
hours) at 6/19/2024 1328  Last data filed at 6/19/2024 0856  Gross per 24 hour   Intake 1079.3 ml   Output 600 ml   Net 479.3 ml        I had a face to face encounter and independently examined this patient on 6/19/2024, as outlined below:  PHYSICAL EXAM:  General: Alert, cooperative  EENT:  EOMI. Anicteric sclerae.  Resp:  CTA bilaterally, no wheezing or rales.  No accessory muscle use  CV:  Regular  rhythm,  No edema  GI:  Soft, Non distended, Non tender.  +Bowel sounds  Neurologic:  Alert and oriented X 3, normal speech,   Psych:   Good insight. Not anxious nor agitated  Skin:  No rashes.  No jaundice    Reviewed most current lab test results and cultures  YES  Reviewed most current radiology test results   YES  Review and summation of old records today    NO  Reviewed patient's current orders and MAR    YES  PMH/SH reviewed - no change compared to H&P    Procedures: see electronic medical records for all procedures/Xrays and details which were not copied into this note but were reviewed prior to creation of Plan.      LABS:  I reviewed today's most current labs and imaging studies.  Pertinent labs include:  Recent Labs     06/18/24  1604 06/19/24  0456   WBC 11.6* 6.6   HGB 14.0 12.7   HCT 42.1 37.8    169     Recent Labs     06/18/24  1604 06/19/24  0456   * 140   K 4.3 3.6   CL 98 108   CO2 29 28   GLUCOSE 439* 300*   BUN 28* 22*   CREATININE 1.00 0.63   CALCIUM 9.1 8.4*   MG 1.7  --    BILITOT 0.6  --    AST 31  --    ALT 53  --        Signed: Beto Schneider MD         
PROVIDER:[TOKEN:[[id number]]],PROVIDER:[TOKEN:[[id number]]]

## 2024-06-21 LAB
BACTERIA SPEC CULT: NORMAL
CC UR VC: NORMAL
SERVICE CMNT-IMP: NORMAL

## 2024-06-23 LAB
SERVICE CMNT-IMP: NORMAL
SERVICE CMNT-IMP: NORMAL

## 2024-06-24 ENCOUNTER — OFFICE VISIT (OUTPATIENT)
Facility: CLINIC | Age: 79
End: 2024-06-24

## 2024-06-24 VITALS
RESPIRATION RATE: 17 BRPM | BODY MASS INDEX: 34.91 KG/M2 | WEIGHT: 197 LBS | SYSTOLIC BLOOD PRESSURE: 150 MMHG | HEART RATE: 78 BPM | DIASTOLIC BLOOD PRESSURE: 88 MMHG | TEMPERATURE: 98.7 F | OXYGEN SATURATION: 93 % | HEIGHT: 63 IN

## 2024-06-24 DIAGNOSIS — Z79.4 CONTROLLED TYPE 2 DIABETES MELLITUS WITH STAGE 2 CHRONIC KIDNEY DISEASE, WITH LONG-TERM CURRENT USE OF INSULIN (HCC): ICD-10-CM

## 2024-06-24 DIAGNOSIS — N39.0 COMPLICATED UTI (URINARY TRACT INFECTION): ICD-10-CM

## 2024-06-24 DIAGNOSIS — Z09 HOSPITAL DISCHARGE FOLLOW-UP: Primary | ICD-10-CM

## 2024-06-24 DIAGNOSIS — E11.22 CONTROLLED TYPE 2 DIABETES MELLITUS WITH STAGE 2 CHRONIC KIDNEY DISEASE, WITH LONG-TERM CURRENT USE OF INSULIN (HCC): ICD-10-CM

## 2024-06-24 DIAGNOSIS — N18.2 CONTROLLED TYPE 2 DIABETES MELLITUS WITH STAGE 2 CHRONIC KIDNEY DISEASE, WITH LONG-TERM CURRENT USE OF INSULIN (HCC): ICD-10-CM

## 2024-06-24 DIAGNOSIS — J84.9 INTERSTITIAL LUNG DISEASE (HCC): ICD-10-CM

## 2024-06-24 DIAGNOSIS — N32.81 OVERACTIVE BLADDER: ICD-10-CM

## 2024-06-24 DIAGNOSIS — N30.00 ACUTE CYSTITIS WITHOUT HEMATURIA: ICD-10-CM

## 2024-06-24 DIAGNOSIS — I12.9 HYPERTENSION WITH RENAL DISEASE: ICD-10-CM

## 2024-06-24 LAB
BACTERIA SPEC CULT: NORMAL
BACTERIA SPEC CULT: NORMAL
SERVICE CMNT-IMP: NORMAL
SERVICE CMNT-IMP: NORMAL

## 2024-06-24 NOTE — PROGRESS NOTES
Follow-Up from Hospital ([facility] - D/C 6/20 [facility] - UTI)       HPI:  Siomara Collins is a 79 y.o. year old female who is here for a follow up visit for hospitalization transition of care.   She was last seen by me on 5/29/2024.     Discharged on: Discharged on 6/20/2024 after being admitted on 6/18/2024    Diagnosis in hospital:   1.Urinary tract infection, complicated recurrent with resistant bacteria  2.Recent hospitalization 4/5-4/14 for sepsis secondary to pneumonia/colitis/UTI  -COVID + parainfluenza positive, completed course of vancomycin => cefdinir   3. Lactic acidosis   4. Insulin-dependent diabetes mellitus with hyperglycemia  5. Diabetic neuropathy  6. Essential hypertension  7. Hyperlipidemia  8. CKD 3, at baseline  9. Tremor  10. Polymyalgia rheumatica  11. Chronic steroids  12. Bilateral S1 screw loosening  13. Obesity class I by BMI 34.28      Complications in hospital: No complications in the hospital    Medication changes: Started cefdinir 300 mg twice daily for 5 days no other medication changes    Discharge Summary reviewed.  Today 6/24/2024      She reports the following: Since discharge from the hospital she has not noted any dysuria or abdominal pain and has no back pain.  She notes no nausea/vomiting and there are no other GI or  complaints.  She notes no chest pain, shortness of breath or cardiorespiratory complaints.  She has no change of her neurologic status.  She was getting physical therapy for hospitalization however since she has been in the hospital now discharged she needs a new order for physical therapy to resume.  She was treated in the hospital with Rocephin IV for 3 days and then changed to cefedinar however urine culture did not have sensitivity tested for this antibiotic.  She notes no other complaints on complete review of systems.          BP (!) 150/88   Pulse 78   Temp 98.7 °F (37.1 °C) (Oral)   Resp 17   Ht 1.6 m (5' 3\")   Wt 89.4 kg (197 lb)   SpO2 93%   BMI 34.90

## 2024-06-24 NOTE — PROGRESS NOTES
iSomara Collins is a 79 y.o. female     Chief Complaint   Patient presents with    Follow-Up from Hospital     [facility] - D/C 6/20 [facility] - UTI       BP (!) 150/90 (Site: Left Upper Arm, Position: Sitting, Cuff Size: Large Adult)   Pulse 78   Temp 98.7 °F (37.1 °C) (Oral)   Resp 17   Ht 1.6 m (5' 3\")   Wt 89.4 kg (197 lb)   SpO2 93%   BMI 34.90 kg/m²     Health Maintenance Due   Topic Date Due    COVID-19 Vaccine (1) Never done    Shingles vaccine (1 of 2) Never done    DEXA (modify frequency per FRAX score)  Never done    Respiratory Syncytial Virus (RSV) Pregnant or age 60 yrs+ (1 - 1-dose 60+ series) Never done    Pneumococcal 65+ years Vaccine (2 of 2 - PPSV23 or PCV20) 09/21/2015         \"Have you been to the ER, urgent care clinic since your last visit?  Hospitalized since your last visit?\"    YES - When: approximately 8 days ago.  Where and Why: [facility].    “Have you seen or consulted any other health care providers outside of [facility] since your last visit?”    NO

## 2024-06-25 LAB
APPEARANCE UR: ABNORMAL
BACTERIA URNS QL MICRO: ABNORMAL /HPF
BILIRUB UR QL: NEGATIVE
COLOR UR: ABNORMAL
EPITH CASTS URNS QL MICRO: ABNORMAL /LPF
GLUCOSE UR STRIP.AUTO-MCNC: >1000 MG/DL
HGB UR QL STRIP: NEGATIVE
KETONES UR QL STRIP.AUTO: NEGATIVE MG/DL
LEUKOCYTE ESTERASE UR QL STRIP.AUTO: ABNORMAL
NITRITE UR QL STRIP.AUTO: NEGATIVE
PH UR STRIP: 5.5 (ref 5–8)
PROT UR STRIP-MCNC: NEGATIVE MG/DL
RBC #/AREA URNS HPF: ABNORMAL /HPF (ref 0–5)
SP GR UR REFRACTOMETRY: 1.01 (ref 1–1.03)
UROBILINOGEN UR QL STRIP.AUTO: 0.2 EU/DL (ref 0.2–1)
WBC URNS QL MICRO: ABNORMAL /HPF (ref 0–4)

## 2024-06-30 PROBLEM — N39.0 URINARY TRACT INFECTION DUE TO ESBL KLEBSIELLA: Status: ACTIVE | Noted: 2024-06-30

## 2024-06-30 PROBLEM — B96.89 URINARY TRACT INFECTION DUE TO ESBL KLEBSIELLA: Status: ACTIVE | Noted: 2024-06-30

## 2024-07-01 ENCOUNTER — OFFICE VISIT (OUTPATIENT)
Facility: CLINIC | Age: 79
End: 2024-07-01
Payer: MEDICARE

## 2024-07-01 VITALS
DIASTOLIC BLOOD PRESSURE: 72 MMHG | HEIGHT: 63 IN | HEART RATE: 70 BPM | RESPIRATION RATE: 17 BRPM | SYSTOLIC BLOOD PRESSURE: 120 MMHG | BODY MASS INDEX: 35.31 KG/M2 | WEIGHT: 199.3 LBS | TEMPERATURE: 98.3 F | OXYGEN SATURATION: 94 %

## 2024-07-01 DIAGNOSIS — N39.0 URINARY TRACT INFECTION DUE TO ESBL KLEBSIELLA: Primary | ICD-10-CM

## 2024-07-01 DIAGNOSIS — N39.0 COMPLICATED UTI (URINARY TRACT INFECTION): ICD-10-CM

## 2024-07-01 DIAGNOSIS — B96.89 URINARY TRACT INFECTION DUE TO ESBL KLEBSIELLA: Primary | ICD-10-CM

## 2024-07-01 PROCEDURE — G8400 PT W/DXA NO RESULTS DOC: HCPCS | Performed by: INTERNAL MEDICINE

## 2024-07-01 PROCEDURE — 1090F PRES/ABSN URINE INCON ASSESS: CPT | Performed by: INTERNAL MEDICINE

## 2024-07-01 PROCEDURE — 99214 OFFICE O/P EST MOD 30 MIN: CPT | Performed by: INTERNAL MEDICINE

## 2024-07-01 PROCEDURE — G8417 CALC BMI ABV UP PARAM F/U: HCPCS | Performed by: INTERNAL MEDICINE

## 2024-07-01 PROCEDURE — 1111F DSCHRG MED/CURRENT MED MERGE: CPT | Performed by: INTERNAL MEDICINE

## 2024-07-01 PROCEDURE — 1036F TOBACCO NON-USER: CPT | Performed by: INTERNAL MEDICINE

## 2024-07-01 PROCEDURE — G8427 DOCREV CUR MEDS BY ELIG CLIN: HCPCS | Performed by: INTERNAL MEDICINE

## 2024-07-01 PROCEDURE — 1123F ACP DISCUSS/DSCN MKR DOCD: CPT | Performed by: INTERNAL MEDICINE

## 2024-07-01 NOTE — PROGRESS NOTES
Siomara Collins is a 79 y.o. female     Chief Complaint   Patient presents with    1 Month Follow-Up       /72 (Site: Left Upper Arm, Position: Sitting, Cuff Size: Large Adult)   Pulse 70   Temp 98.3 °F (36.8 °C) (Oral)   Resp 17   Ht 1.6 m (5' 3\")   Wt 90.4 kg (199 lb 4.8 oz)   SpO2 94%   BMI 35.30 kg/m²     Health Maintenance Due   Topic Date Due    COVID-19 Vaccine (1) Never done    Shingles vaccine (1 of 2) Never done    DEXA (modify frequency per FRAX score)  Never done    Respiratory Syncytial Virus (RSV) Pregnant or age 60 yrs+ (1 - 1-dose 60+ series) Never done    Pneumococcal 65+ years Vaccine (2 of 2 - PPSV23 or PCV20) 09/21/2015         \"Have you been to the ER, urgent care clinic since your last visit?  Hospitalized since your last visit?\"    NO    “Have you seen or consulted any other health care providers outside of [facility] since your last visit?”    NO                    
effort  CARDIOVASCULAR: Heart: regular rate and rhythm no murmurs, rubs or gallops, PMI not displaced, No thrills, no peripheral edema  GASTROINTESTINAL: Abdomen: non distended, soft, non tender, bowel sounds normal  HEMATOLOGIC: no purpura, petechiae or bruising  LYMPHATIC: No lymph node enlargemant  MUSCULOSKELETAL: Extremities: no active synovitis, pulse 1+   INTEGUMENT: No unusual rashes or suspicious skin lesions noted. Nails appear normal.  PERIPHERAL VASCULAR: normal pulses femoral, PT and DP  NEUROLOGIC: non-focal exam, A & O X 3  PSYCHIATRIC:, appropriate affect     ASSESSMENT:   1. Urinary tract infection due to ESBL Klebsiella    2. Complicated UTI (urinary tract infection)      Impression  1.  Multiresistant urinary tract infection with ESBL needs IV meropenem for a 7 to 10-day course.  The only way to get this is get her back in the hospital so she can have a PICC line placed and hopefully be able to continue to receive this as an outpatient for late full course of treatment as there is no other option.  She is still symptomatic with burning and frequency.  I have sent her thus to the emergency room where Ra she can be admitted for observation of the of the get a PICC line and started on antibiotics.  I reviewed this with her and her cousin who is with her at office visit today.    PLAN:  1. Urinary tract infection due to ESBL Klebsiella  2. Complicated UTI (urinary tract infection)          ATTENTION:   This medical record was transcribed using an electronic medical records system.  Although proofread, it may and can contain electronic and spelling errors.  Other human spelling and other errors may be present.  Corrections may be executed at a later time.  Please feel free to contact us for any clarifications as needed.      No follow-up provider specified.    No results found for any visits on 07/01/24.    Irvin Vang MD    The patient verbalized understanding of the problems and plans as

## 2024-07-02 ENCOUNTER — HOSPITAL ENCOUNTER (INPATIENT)
Facility: HOSPITAL | Age: 79
LOS: 5 days | Discharge: NURSING, REHAB OR LONG-TERM CARE | DRG: 690 | End: 2024-07-08
Attending: EMERGENCY MEDICINE | Admitting: INTERNAL MEDICINE
Payer: MEDICARE

## 2024-07-02 DIAGNOSIS — G62.9 NEUROPATHY: ICD-10-CM

## 2024-07-02 DIAGNOSIS — N17.9 ACUTE KIDNEY INJURY (HCC): ICD-10-CM

## 2024-07-02 DIAGNOSIS — R73.9 HYPERGLYCEMIA: ICD-10-CM

## 2024-07-02 DIAGNOSIS — N39.0 URINARY TRACT INFECTION WITHOUT HEMATURIA, SITE UNSPECIFIED: Primary | ICD-10-CM

## 2024-07-02 LAB
ALBUMIN SERPL-MCNC: 3.3 G/DL (ref 3.5–5)
ALBUMIN/GLOB SERPL: 1.2 (ref 1.1–2.2)
ALP SERPL-CCNC: 68 U/L (ref 45–117)
ALT SERPL-CCNC: 50 U/L (ref 12–78)
ANION GAP SERPL CALC-SCNC: 8 MMOL/L (ref 5–15)
APPEARANCE UR: CLEAR
AST SERPL-CCNC: 24 U/L (ref 15–37)
BACTERIA URNS QL MICRO: ABNORMAL /HPF
BASOPHILS # BLD: 0 K/UL (ref 0–0.1)
BASOPHILS NFR BLD: 0 % (ref 0–1)
BILIRUB SERPL-MCNC: 0.5 MG/DL (ref 0.2–1)
BILIRUB UR QL: NEGATIVE
BUN SERPL-MCNC: 32 MG/DL (ref 6–20)
BUN/CREAT SERPL: 29 (ref 12–20)
CALCIUM SERPL-MCNC: 9 MG/DL (ref 8.5–10.1)
CHLORIDE SERPL-SCNC: 96 MMOL/L (ref 97–108)
CHLORIDE UR-SCNC: 96 MMOL/L
CO2 SERPL-SCNC: 30 MMOL/L (ref 21–32)
COLOR UR: ABNORMAL
CREAT SERPL-MCNC: 1.12 MG/DL (ref 0.55–1.02)
DIFFERENTIAL METHOD BLD: ABNORMAL
EOSINOPHIL # BLD: 0 K/UL (ref 0–0.4)
EOSINOPHIL NFR BLD: 0 % (ref 0–7)
EPITH CASTS URNS QL MICRO: ABNORMAL /LPF
ERYTHROCYTE [DISTWIDTH] IN BLOOD BY AUTOMATED COUNT: 13.7 % (ref 11.5–14.5)
EST. AVERAGE GLUCOSE BLD GHB EST-MCNC: 246 MG/DL
GLOBULIN SER CALC-MCNC: 2.8 G/DL (ref 2–4)
GLUCOSE BLD STRIP.AUTO-MCNC: 348 MG/DL (ref 65–117)
GLUCOSE BLD STRIP.AUTO-MCNC: 396 MG/DL (ref 65–117)
GLUCOSE SERPL-MCNC: 455 MG/DL (ref 65–100)
GLUCOSE UR STRIP.AUTO-MCNC: >1000 MG/DL
HBA1C MFR BLD: 10.2 % (ref 4–5.6)
HCT VFR BLD AUTO: 39.7 % (ref 35–47)
HGB BLD-MCNC: 13.6 G/DL (ref 11.5–16)
HGB UR QL STRIP: NEGATIVE
IMM GRANULOCYTES # BLD AUTO: 0.1 K/UL (ref 0–0.04)
IMM GRANULOCYTES NFR BLD AUTO: 1 % (ref 0–0.5)
KETONES UR QL STRIP.AUTO: NEGATIVE MG/DL
LEUKOCYTE ESTERASE UR QL STRIP.AUTO: NEGATIVE
LYMPHOCYTES # BLD: 0.6 K/UL (ref 0.8–3.5)
LYMPHOCYTES NFR BLD: 7 % (ref 12–49)
MCH RBC QN AUTO: 34.3 PG (ref 26–34)
MCHC RBC AUTO-ENTMCNC: 34.3 G/DL (ref 30–36.5)
MCV RBC AUTO: 100 FL (ref 80–99)
MONOCYTES # BLD: 0.2 K/UL (ref 0–1)
MONOCYTES NFR BLD: 3 % (ref 5–13)
NEUTS SEG # BLD: 7.1 K/UL (ref 1.8–8)
NEUTS SEG NFR BLD: 89 % (ref 32–75)
NITRITE UR QL STRIP.AUTO: NEGATIVE
NRBC # BLD: 0 K/UL (ref 0–0.01)
NRBC BLD-RTO: 0 PER 100 WBC
PH UR STRIP: 6.5 (ref 5–8)
PLATELET # BLD AUTO: 163 K/UL (ref 150–400)
PMV BLD AUTO: 10.7 FL (ref 8.9–12.9)
POTASSIUM SERPL-SCNC: 3.6 MMOL/L (ref 3.5–5.1)
PROCALCITONIN SERPL-MCNC: <0.05 NG/ML
PROT SERPL-MCNC: 6.1 G/DL (ref 6.4–8.2)
PROT UR STRIP-MCNC: NEGATIVE MG/DL
RBC # BLD AUTO: 3.97 M/UL (ref 3.8–5.2)
RBC #/AREA URNS HPF: ABNORMAL /HPF (ref 0–5)
RBC MORPH BLD: ABNORMAL
SERVICE CMNT-IMP: ABNORMAL
SERVICE CMNT-IMP: ABNORMAL
SODIUM SERPL-SCNC: 134 MMOL/L (ref 136–145)
SODIUM UR-SCNC: 105 MMOL/L
SP GR UR REFRACTOMETRY: 1.02
URINE CULTURE IF INDICATED: ABNORMAL
UROBILINOGEN UR QL STRIP.AUTO: 0.2 EU/DL (ref 0.2–1)
UUN UR-MCNC: 385 MG/DL
WBC # BLD AUTO: 8 K/UL (ref 3.6–11)
WBC URNS QL MICRO: ABNORMAL /HPF (ref 0–4)

## 2024-07-02 PROCEDURE — 36415 COLL VENOUS BLD VENIPUNCTURE: CPT

## 2024-07-02 PROCEDURE — 84300 ASSAY OF URINE SODIUM: CPT

## 2024-07-02 PROCEDURE — 83036 HEMOGLOBIN GLYCOSYLATED A1C: CPT

## 2024-07-02 PROCEDURE — 82962 GLUCOSE BLOOD TEST: CPT

## 2024-07-02 PROCEDURE — 84145 PROCALCITONIN (PCT): CPT

## 2024-07-02 PROCEDURE — 99285 EMERGENCY DEPT VISIT HI MDM: CPT

## 2024-07-02 PROCEDURE — 87086 URINE CULTURE/COLONY COUNT: CPT

## 2024-07-02 PROCEDURE — 2580000003 HC RX 258: Performed by: EMERGENCY MEDICINE

## 2024-07-02 PROCEDURE — 96365 THER/PROPH/DIAG IV INF INIT: CPT

## 2024-07-02 PROCEDURE — 81001 URINALYSIS AUTO W/SCOPE: CPT

## 2024-07-02 PROCEDURE — 87186 SC STD MICRODIL/AGAR DIL: CPT

## 2024-07-02 PROCEDURE — 6360000002 HC RX W HCPCS: Performed by: EMERGENCY MEDICINE

## 2024-07-02 PROCEDURE — 6360000002 HC RX W HCPCS: Performed by: INTERNAL MEDICINE

## 2024-07-02 PROCEDURE — 2580000003 HC RX 258: Performed by: INTERNAL MEDICINE

## 2024-07-02 PROCEDURE — 96366 THER/PROPH/DIAG IV INF ADDON: CPT

## 2024-07-02 PROCEDURE — 85025 COMPLETE CBC W/AUTO DIFF WBC: CPT

## 2024-07-02 PROCEDURE — 6370000000 HC RX 637 (ALT 250 FOR IP): Performed by: INTERNAL MEDICINE

## 2024-07-02 PROCEDURE — 80053 COMPREHEN METABOLIC PANEL: CPT

## 2024-07-02 PROCEDURE — G0378 HOSPITAL OBSERVATION PER HR: HCPCS

## 2024-07-02 PROCEDURE — 96372 THER/PROPH/DIAG INJ SC/IM: CPT

## 2024-07-02 PROCEDURE — 87088 URINE BACTERIA CULTURE: CPT

## 2024-07-02 PROCEDURE — 84540 ASSAY OF URINE/UREA-N: CPT

## 2024-07-02 PROCEDURE — 82436 ASSAY OF URINE CHLORIDE: CPT

## 2024-07-02 RX ORDER — POTASSIUM CHLORIDE 7.45 MG/ML
10 INJECTION INTRAVENOUS PRN
Status: DISCONTINUED | OUTPATIENT
Start: 2024-07-02 | End: 2024-07-05

## 2024-07-02 RX ORDER — ONDANSETRON 2 MG/ML
4 INJECTION INTRAMUSCULAR; INTRAVENOUS EVERY 6 HOURS PRN
Status: DISCONTINUED | OUTPATIENT
Start: 2024-07-02 | End: 2024-07-08 | Stop reason: HOSPADM

## 2024-07-02 RX ORDER — DEXTROSE MONOHYDRATE 100 MG/ML
INJECTION, SOLUTION INTRAVENOUS CONTINUOUS PRN
Status: DISCONTINUED | OUTPATIENT
Start: 2024-07-02 | End: 2024-07-08 | Stop reason: HOSPADM

## 2024-07-02 RX ORDER — POTASSIUM CHLORIDE 20 MEQ/1
40 TABLET, EXTENDED RELEASE ORAL PRN
Status: DISCONTINUED | OUTPATIENT
Start: 2024-07-02 | End: 2024-07-05

## 2024-07-02 RX ORDER — INSULIN LISPRO 100 [IU]/ML
0-4 INJECTION, SOLUTION INTRAVENOUS; SUBCUTANEOUS NIGHTLY
Status: DISCONTINUED | OUTPATIENT
Start: 2024-07-02 | End: 2024-07-08 | Stop reason: HOSPADM

## 2024-07-02 RX ORDER — PRIMIDONE 50 MG/1
50 TABLET ORAL 2 TIMES DAILY
Status: DISCONTINUED | OUTPATIENT
Start: 2024-07-02 | End: 2024-07-08 | Stop reason: HOSPADM

## 2024-07-02 RX ORDER — CARVEDILOL 12.5 MG/1
12.5 TABLET ORAL 2 TIMES DAILY WITH MEALS
Status: DISCONTINUED | OUTPATIENT
Start: 2024-07-02 | End: 2024-07-08

## 2024-07-02 RX ORDER — POLYETHYLENE GLYCOL 3350 17 G/17G
17 POWDER, FOR SOLUTION ORAL DAILY PRN
Status: DISCONTINUED | OUTPATIENT
Start: 2024-07-02 | End: 2024-07-08 | Stop reason: HOSPADM

## 2024-07-02 RX ORDER — LIDOCAINE 4 G/G
1 PATCH TOPICAL DAILY
Status: DISCONTINUED | OUTPATIENT
Start: 2024-07-02 | End: 2024-07-08 | Stop reason: HOSPADM

## 2024-07-02 RX ORDER — INSULIN GLARGINE 100 [IU]/ML
40 INJECTION, SOLUTION SUBCUTANEOUS EVERY MORNING
Status: DISCONTINUED | OUTPATIENT
Start: 2024-07-03 | End: 2024-07-05

## 2024-07-02 RX ORDER — GLUCAGON 1 MG/ML
1 KIT INJECTION PRN
Status: DISCONTINUED | OUTPATIENT
Start: 2024-07-02 | End: 2024-07-08 | Stop reason: HOSPADM

## 2024-07-02 RX ORDER — SODIUM CHLORIDE 0.9 % (FLUSH) 0.9 %
5-40 SYRINGE (ML) INJECTION PRN
Status: DISCONTINUED | OUTPATIENT
Start: 2024-07-02 | End: 2024-07-08 | Stop reason: HOSPADM

## 2024-07-02 RX ORDER — SODIUM CHLORIDE 9 MG/ML
INJECTION, SOLUTION INTRAVENOUS CONTINUOUS
Status: DISCONTINUED | OUTPATIENT
Start: 2024-07-02 | End: 2024-07-05

## 2024-07-02 RX ORDER — VITAMIN B COMPLEX
1000 TABLET ORAL EVERY MORNING
Status: DISCONTINUED | OUTPATIENT
Start: 2024-07-03 | End: 2024-07-08 | Stop reason: HOSPADM

## 2024-07-02 RX ORDER — GABAPENTIN 100 MG/1
100 CAPSULE ORAL 3 TIMES DAILY
Status: DISCONTINUED | OUTPATIENT
Start: 2024-07-02 | End: 2024-07-07

## 2024-07-02 RX ORDER — SODIUM CHLORIDE 9 MG/ML
INJECTION, SOLUTION INTRAVENOUS PRN
Status: DISCONTINUED | OUTPATIENT
Start: 2024-07-02 | End: 2024-07-08 | Stop reason: HOSPADM

## 2024-07-02 RX ORDER — INSULIN LISPRO 100 [IU]/ML
0-8 INJECTION, SOLUTION INTRAVENOUS; SUBCUTANEOUS
Status: DISCONTINUED | OUTPATIENT
Start: 2024-07-02 | End: 2024-07-08 | Stop reason: HOSPADM

## 2024-07-02 RX ORDER — MAGNESIUM SULFATE IN WATER 40 MG/ML
2000 INJECTION, SOLUTION INTRAVENOUS PRN
Status: DISCONTINUED | OUTPATIENT
Start: 2024-07-02 | End: 2024-07-08 | Stop reason: HOSPADM

## 2024-07-02 RX ORDER — MONTELUKAST SODIUM 10 MG/1
10 TABLET ORAL NIGHTLY
Status: DISCONTINUED | OUTPATIENT
Start: 2024-07-02 | End: 2024-07-08 | Stop reason: HOSPADM

## 2024-07-02 RX ORDER — ACETAMINOPHEN 325 MG/1
650 TABLET ORAL EVERY 6 HOURS PRN
Status: DISCONTINUED | OUTPATIENT
Start: 2024-07-02 | End: 2024-07-02 | Stop reason: SDUPTHER

## 2024-07-02 RX ORDER — ONDANSETRON 4 MG/1
4 TABLET, ORALLY DISINTEGRATING ORAL EVERY 8 HOURS PRN
Status: DISCONTINUED | OUTPATIENT
Start: 2024-07-02 | End: 2024-07-08 | Stop reason: HOSPADM

## 2024-07-02 RX ORDER — ENOXAPARIN SODIUM 100 MG/ML
40 INJECTION SUBCUTANEOUS DAILY
Status: DISCONTINUED | OUTPATIENT
Start: 2024-07-02 | End: 2024-07-08 | Stop reason: HOSPADM

## 2024-07-02 RX ORDER — ACETAMINOPHEN 650 MG/1
650 SUPPOSITORY RECTAL EVERY 6 HOURS PRN
Status: DISCONTINUED | OUTPATIENT
Start: 2024-07-02 | End: 2024-07-08 | Stop reason: HOSPADM

## 2024-07-02 RX ORDER — ACETAMINOPHEN 325 MG/1
650 TABLET ORAL EVERY 4 HOURS PRN
Status: DISCONTINUED | OUTPATIENT
Start: 2024-07-02 | End: 2024-07-08 | Stop reason: HOSPADM

## 2024-07-02 RX ORDER — INSULIN GLARGINE 100 [IU]/ML
10 INJECTION, SOLUTION SUBCUTANEOUS NIGHTLY
Status: DISCONTINUED | OUTPATIENT
Start: 2024-07-02 | End: 2024-07-08 | Stop reason: HOSPADM

## 2024-07-02 RX ORDER — ROSUVASTATIN CALCIUM 20 MG/1
20 TABLET, COATED ORAL NIGHTLY
Status: DISCONTINUED | OUTPATIENT
Start: 2024-07-02 | End: 2024-07-08 | Stop reason: HOSPADM

## 2024-07-02 RX ORDER — SODIUM CHLORIDE 0.9 % (FLUSH) 0.9 %
5-40 SYRINGE (ML) INJECTION EVERY 12 HOURS SCHEDULED
Status: DISCONTINUED | OUTPATIENT
Start: 2024-07-02 | End: 2024-07-08 | Stop reason: HOSPADM

## 2024-07-02 RX ADMIN — POTASSIUM BICARBONATE 40 MEQ: 782 TABLET, EFFERVESCENT ORAL at 17:07

## 2024-07-02 RX ADMIN — PRIMIDONE 50 MG: 50 TABLET ORAL at 21:03

## 2024-07-02 RX ADMIN — INSULIN LISPRO 4 UNITS: 100 INJECTION, SOLUTION INTRAVENOUS; SUBCUTANEOUS at 21:03

## 2024-07-02 RX ADMIN — GABAPENTIN 100 MG: 100 CAPSULE ORAL at 17:07

## 2024-07-02 RX ADMIN — CARVEDILOL 12.5 MG: 12.5 TABLET, FILM COATED ORAL at 17:07

## 2024-07-02 RX ADMIN — ACETAMINOPHEN 650 MG: 325 TABLET ORAL at 17:24

## 2024-07-02 RX ADMIN — INSULIN LISPRO 12 UNITS: 100 INJECTION, SOLUTION INTRAVENOUS; SUBCUTANEOUS at 17:08

## 2024-07-02 RX ADMIN — SODIUM CHLORIDE: 9 INJECTION, SOLUTION INTRAVENOUS at 17:18

## 2024-07-02 RX ADMIN — MEROPENEM 1000 MG: 1 INJECTION, POWDER, FOR SOLUTION INTRAVENOUS at 22:36

## 2024-07-02 RX ADMIN — ROSUVASTATIN 20 MG: 20 TABLET, FILM COATED ORAL at 21:03

## 2024-07-02 RX ADMIN — SODIUM CHLORIDE, PRESERVATIVE FREE 10 ML: 5 INJECTION INTRAVENOUS at 21:03

## 2024-07-02 RX ADMIN — MEROPENEM 1000 MG: 1 INJECTION, POWDER, FOR SOLUTION INTRAVENOUS at 14:52

## 2024-07-02 RX ADMIN — MONTELUKAST 10 MG: 10 TABLET, FILM COATED ORAL at 21:03

## 2024-07-02 RX ADMIN — GABAPENTIN 100 MG: 100 CAPSULE ORAL at 21:03

## 2024-07-02 RX ADMIN — INSULIN GLARGINE 10 UNITS: 100 INJECTION, SOLUTION SUBCUTANEOUS at 21:04

## 2024-07-02 RX ADMIN — ENOXAPARIN SODIUM 40 MG: 100 INJECTION SUBCUTANEOUS at 18:01

## 2024-07-02 ASSESSMENT — PAIN SCALES - GENERAL
PAINLEVEL_OUTOF10: 5
PAINLEVEL_OUTOF10: 0
PAINLEVEL_OUTOF10: 0

## 2024-07-02 ASSESSMENT — PAIN DESCRIPTION - LOCATION: LOCATION: LEG

## 2024-07-02 NOTE — CARE COORDINATION
Care Management Initial Assessment       RUR: N/A - OBS   Readmission? Yes - 6/18-6/20/24 at [facility] for Complicated UTI  1st IM letter given? No, to be given by [facility]  1st  letter given: No    Initial note: Chart review completed prior to assessment. CM completed assessment with pt at bedside. CM introduced self, role of CM, verified demographics to ensure accuracy, and discussed transition of care planning. Pt resides at [facility] [address] [place] [address] , [address], [place] [address]. Pt reports that [facility] staff assist her with medication management, reports being able to manage her ADLs. She reports being ambulatory with rolling walker. Pt denies current HH/Hospice services, does have previous hospice hx. Pt reports that [facility] will transport her back to [facility] at time of d/c. Pt has ACP documents on file which remain accurate per her wishes.     Care management will continue to be available to assist as transition of care planning needs arise. Full readmission assessment below:         07/02/24 1550   Service Assessment   Patient Orientation Alert and Oriented   Cognition Alert   History Provided By Patient   Primary Caregiver Self   Support Systems Children;Other (Comment)  ([facility] - [facility])   Patient's Healthcare Decision Maker is: Named in Scanned ACP Document   PCP Verified by CM Yes  (Dr. Irvin Vang)   Last Visit to PCP Within last 3 months  (Yesterday, 7/1/24)   Prior Functional Level Assistance with the following:;Housework;Shopping;Cooking;Mobility   Can patient return to prior living arrangement Yes   Ability to make needs known: Good   Family able to assist with home care needs: No  (Has support from [facility] staff)   Would you like for me to discuss the discharge plan with any other family members/significant others, and if so, who? Yes  (Son, Jimi Verdin [phone])   Financial Resources Medicare  (Medicare Part A and B)   Community

## 2024-07-02 NOTE — H&P
Hospitalist Admission Note    NAME:   Siomara Collins   : 1945   MRN: [id number]     Date/Time: 2024 4:21 PM    Patient PCP: Irvin Vang MD    ______________________________________________________________________  Given the patient's current clinical presentation, I have a high level of concern for decompensation if discharged from the emergency department.  Complex decision making was performed, which includes reviewing the patient's available past medical records, laboratory results, and x-ray films.       My assessment of this patient's clinical condition and my plan of care is as follows.    Assessment / Plan:    ESBL urinary tract infection-of note patient's urinalysis is negative for urinary tract infection however given prior history as well as note from PCP as well as history provided by patient we will treat as a presumed ESBL urinary tract infection  Send off urine culture  Start meropenem for antibiotic coverage given previous urine culture sensitivities  Trend inflammatory markers, however if inflammatory markers are negative can consider discontinuation of antibiotics  Continue to monitor patient's clinical status    Hyperglycemia type 2 diabetes-of note patient is presenting with frequency/urgency with uncontrolled diabetes, likely secondary to symptomatic hyperglycemia  Maintenance IV fluids  Reinitiate home insulin regimen  Insulin sliding scale for additional coverage  Continue to monitor patient's clinical status    Acute kidney injury-of note patient found to have an elevated serum creatinine, differentials include prerenal versus renal versus postrenal etiologies  Avoid nephrotoxic medications  Obtain urine electrolytes to calculate fractional excretion of sodium  Maintenance IV fluids  Continue current serum creatinine    Hypokalemia-replete K and recheck K    Prophylaxis-Lovenox  FEN-diabetic diet, maintenance IV fluids, replete potassium and magnesium  Full code, clarify

## 2024-07-02 NOTE — ED PROVIDER NOTES
[facility] EMERGENCY DEPT  EMERGENCY DEPARTMENT ENCOUNTER    Patient Name: Siomara Collins  MRN: [id number]  YOB: 1945  Provider: John Handy MD  PCP: Irvin Vang MD  Time/Date of evaluation: 12:25 PM EDT on 7/2/24    History of Presenting Illness     Chief Complaint   Patient presents with    Dysuria     Arrives to triage in wheelchair c/o urinary frequency and dysuria ongoing since January     History Provided by: Patient   History is limited by: Nothing    HISTORY (Narrative):   Siomara Collins is a 79 y.o. female with a PMHX of osteoarthritis, breast cancer, CKD, chronic pain, ITP, diabetes, depression, IBS, and polymyalgia rheumatica  who presents to the emergency department (room 27) by POV C/O dysuria, urinary frequency, and pelvic discomfort that has been happening frequently since January.  Patient states his symptoms never go away.  She has been seen multiple times and been diagnosed with an ESBL UTI.  She was just seen 2 weeks ago for the symptoms and completed a course of cefdinir but states the symptoms did not improve.    Nursing Notes were all reviewed and agreed with or any disagreements were addressed in the HPI.    Past History     PAST MEDICAL HISTORY:  Past Medical History:   Diagnosis Date    Abnormal LFTs 08/24/2017    FATTY LIVER    Arthritis     OSTEO    Avascular necrosis of hip (HCC) 08/24/2017    BILAT HIPS    Breast CA (HCC) 1989, 2001    BILATERAL; SURGERY, CHEMO    Chronic pain     CKD (chronic kidney disease), stage II 8/24/2017    Coagulation disorder (HCC) 1984    ITP  (DR DC CAR) - PLATELETS DROPPED TO 5K    Depression     Diabetes (HCC) 2012    TYPE 2; NIDDM    DJD (degenerative joint disease), multiple sites 8/24/2017    GI bleed 2008    HEMORRHOIDS    Hyperlipemia     Hypertension with renal disease 8/24/2017    IBS (irritable bowel syndrome) 8/24/2017    Ill-defined condition 2015    PNEUMONIA X2 - HOSPITALIZED IN 2015    Interstitial lung disease (HCC)

## 2024-07-03 LAB
ALBUMIN SERPL-MCNC: 2.9 G/DL (ref 3.5–5)
ALBUMIN/GLOB SERPL: 1.1 (ref 1.1–2.2)
ALP SERPL-CCNC: 55 U/L (ref 45–117)
ALT SERPL-CCNC: 47 U/L (ref 12–78)
ANION GAP SERPL CALC-SCNC: 8 MMOL/L (ref 5–15)
AST SERPL-CCNC: 22 U/L (ref 15–37)
BASOPHILS # BLD: 0 K/UL (ref 0–0.1)
BASOPHILS NFR BLD: 0 % (ref 0–1)
BILIRUB SERPL-MCNC: 0.5 MG/DL (ref 0.2–1)
BUN SERPL-MCNC: 25 MG/DL (ref 6–20)
BUN/CREAT SERPL: 38 (ref 12–20)
CALCIUM SERPL-MCNC: 8.5 MG/DL (ref 8.5–10.1)
CHLORIDE SERPL-SCNC: 108 MMOL/L (ref 97–108)
CO2 SERPL-SCNC: 28 MMOL/L (ref 21–32)
CREAT SERPL-MCNC: 0.66 MG/DL (ref 0.55–1.02)
DIFFERENTIAL METHOD BLD: ABNORMAL
EOSINOPHIL # BLD: 0.1 K/UL (ref 0–0.4)
EOSINOPHIL NFR BLD: 1 % (ref 0–7)
ERYTHROCYTE [DISTWIDTH] IN BLOOD BY AUTOMATED COUNT: 13.6 % (ref 11.5–14.5)
GLOBULIN SER CALC-MCNC: 2.6 G/DL (ref 2–4)
GLUCOSE BLD STRIP.AUTO-MCNC: 257 MG/DL (ref 65–117)
GLUCOSE BLD STRIP.AUTO-MCNC: 302 MG/DL (ref 65–117)
GLUCOSE BLD STRIP.AUTO-MCNC: 337 MG/DL (ref 65–117)
GLUCOSE BLD STRIP.AUTO-MCNC: 348 MG/DL (ref 65–117)
GLUCOSE BLD STRIP.AUTO-MCNC: 99 MG/DL (ref 65–117)
GLUCOSE SERPL-MCNC: 123 MG/DL (ref 65–100)
HCT VFR BLD AUTO: 39 % (ref 35–47)
HGB BLD-MCNC: 13.4 G/DL (ref 11.5–16)
IMM GRANULOCYTES # BLD AUTO: 0.1 K/UL (ref 0–0.04)
IMM GRANULOCYTES NFR BLD AUTO: 1 % (ref 0–0.5)
LYMPHOCYTES # BLD: 1.9 K/UL (ref 0.8–3.5)
LYMPHOCYTES NFR BLD: 25 % (ref 12–49)
MAGNESIUM SERPL-MCNC: 1.7 MG/DL (ref 1.6–2.4)
MCH RBC QN AUTO: 34.3 PG (ref 26–34)
MCHC RBC AUTO-ENTMCNC: 34.4 G/DL (ref 30–36.5)
MCV RBC AUTO: 99.7 FL (ref 80–99)
MONOCYTES # BLD: 0.5 K/UL (ref 0–1)
MONOCYTES NFR BLD: 6 % (ref 5–13)
NEUTS SEG # BLD: 4.9 K/UL (ref 1.8–8)
NEUTS SEG NFR BLD: 67 % (ref 32–75)
NRBC # BLD: 0 K/UL (ref 0–0.01)
NRBC BLD-RTO: 0 PER 100 WBC
PLATELET # BLD AUTO: 146 K/UL (ref 150–400)
PMV BLD AUTO: 10.1 FL (ref 8.9–12.9)
POTASSIUM SERPL-SCNC: 3.1 MMOL/L (ref 3.5–5.1)
PROCALCITONIN SERPL-MCNC: <0.05 NG/ML
PROT SERPL-MCNC: 5.5 G/DL (ref 6.4–8.2)
RBC # BLD AUTO: 3.91 M/UL (ref 3.8–5.2)
SERVICE CMNT-IMP: ABNORMAL
SERVICE CMNT-IMP: NORMAL
SODIUM SERPL-SCNC: 144 MMOL/L (ref 136–145)
WBC # BLD AUTO: 7.4 K/UL (ref 3.6–11)

## 2024-07-03 PROCEDURE — 97166 OT EVAL MOD COMPLEX 45 MIN: CPT | Performed by: OCCUPATIONAL THERAPIST

## 2024-07-03 PROCEDURE — 84145 PROCALCITONIN (PCT): CPT

## 2024-07-03 PROCEDURE — 82962 GLUCOSE BLOOD TEST: CPT

## 2024-07-03 PROCEDURE — G0378 HOSPITAL OBSERVATION PER HR: HCPCS

## 2024-07-03 PROCEDURE — 2580000003 HC RX 258: Performed by: INTERNAL MEDICINE

## 2024-07-03 PROCEDURE — 97530 THERAPEUTIC ACTIVITIES: CPT | Performed by: OCCUPATIONAL THERAPIST

## 2024-07-03 PROCEDURE — 85025 COMPLETE CBC W/AUTO DIFF WBC: CPT

## 2024-07-03 PROCEDURE — 97530 THERAPEUTIC ACTIVITIES: CPT

## 2024-07-03 PROCEDURE — 96366 THER/PROPH/DIAG IV INF ADDON: CPT

## 2024-07-03 PROCEDURE — 97162 PT EVAL MOD COMPLEX 30 MIN: CPT

## 2024-07-03 PROCEDURE — 6370000000 HC RX 637 (ALT 250 FOR IP): Performed by: INTERNAL MEDICINE

## 2024-07-03 PROCEDURE — 80053 COMPREHEN METABOLIC PANEL: CPT

## 2024-07-03 PROCEDURE — 1100000000 HC RM PRIVATE

## 2024-07-03 PROCEDURE — 6360000002 HC RX W HCPCS: Performed by: INTERNAL MEDICINE

## 2024-07-03 PROCEDURE — 83735 ASSAY OF MAGNESIUM: CPT

## 2024-07-03 PROCEDURE — 36415 COLL VENOUS BLD VENIPUNCTURE: CPT

## 2024-07-03 RX ORDER — HALOPERIDOL 2 MG/ML
0.75 SOLUTION ORAL EVERY 4 HOURS PRN
COMMUNITY
End: 2024-07-12

## 2024-07-03 RX ORDER — PREDNISONE 5 MG/1
10 TABLET ORAL 3 TIMES DAILY
Status: DISCONTINUED | OUTPATIENT
Start: 2024-07-03 | End: 2024-07-04

## 2024-07-03 RX ORDER — LANOLIN AND PETROLATUM 136.4; 469.9 MG/G; MG/G
OINTMENT TOPICAL 2 TIMES DAILY PRN
COMMUNITY

## 2024-07-03 RX ORDER — PANTOPRAZOLE SODIUM 40 MG/1
40 TABLET, DELAYED RELEASE ORAL
Status: DISCONTINUED | OUTPATIENT
Start: 2024-07-04 | End: 2024-07-08 | Stop reason: HOSPADM

## 2024-07-03 RX ADMIN — CARVEDILOL 12.5 MG: 12.5 TABLET, FILM COATED ORAL at 10:09

## 2024-07-03 RX ADMIN — GABAPENTIN 100 MG: 100 CAPSULE ORAL at 15:45

## 2024-07-03 RX ADMIN — Medication 1000 UNITS: at 10:09

## 2024-07-03 RX ADMIN — MONTELUKAST 10 MG: 10 TABLET, FILM COATED ORAL at 21:50

## 2024-07-03 RX ADMIN — PREDNISONE 10 MG: 5 TABLET ORAL at 15:45

## 2024-07-03 RX ADMIN — SODIUM CHLORIDE, PRESERVATIVE FREE 10 ML: 5 INJECTION INTRAVENOUS at 21:51

## 2024-07-03 RX ADMIN — SODIUM CHLORIDE, PRESERVATIVE FREE 10 ML: 5 INJECTION INTRAVENOUS at 10:07

## 2024-07-03 RX ADMIN — CARVEDILOL 12.5 MG: 12.5 TABLET, FILM COATED ORAL at 18:03

## 2024-07-03 RX ADMIN — INSULIN GLARGINE 40 UNITS: 100 INJECTION, SOLUTION SUBCUTANEOUS at 10:07

## 2024-07-03 RX ADMIN — ACETAMINOPHEN 650 MG: 325 TABLET ORAL at 23:04

## 2024-07-03 RX ADMIN — MEROPENEM 1000 MG: 1 INJECTION, POWDER, FOR SOLUTION INTRAVENOUS at 13:43

## 2024-07-03 RX ADMIN — ROSUVASTATIN 20 MG: 20 TABLET, FILM COATED ORAL at 21:50

## 2024-07-03 RX ADMIN — ACETAMINOPHEN 650 MG: 325 TABLET ORAL at 10:09

## 2024-07-03 RX ADMIN — SODIUM CHLORIDE: 9 INJECTION, SOLUTION INTRAVENOUS at 12:09

## 2024-07-03 RX ADMIN — PRIMIDONE 50 MG: 50 TABLET ORAL at 10:11

## 2024-07-03 RX ADMIN — INSULIN LISPRO 4 UNITS: 100 INJECTION, SOLUTION INTRAVENOUS; SUBCUTANEOUS at 14:00

## 2024-07-03 RX ADMIN — INSULIN LISPRO 4 UNITS: 100 INJECTION, SOLUTION INTRAVENOUS; SUBCUTANEOUS at 18:03

## 2024-07-03 RX ADMIN — INSULIN GLARGINE 10 UNITS: 100 INJECTION, SOLUTION SUBCUTANEOUS at 22:03

## 2024-07-03 RX ADMIN — PREDNISONE 10 MG: 5 TABLET ORAL at 21:50

## 2024-07-03 RX ADMIN — MEROPENEM 1000 MG: 1 INJECTION, POWDER, FOR SOLUTION INTRAVENOUS at 22:56

## 2024-07-03 RX ADMIN — GABAPENTIN 100 MG: 100 CAPSULE ORAL at 10:08

## 2024-07-03 RX ADMIN — INSULIN LISPRO 4 UNITS: 100 INJECTION, SOLUTION INTRAVENOUS; SUBCUTANEOUS at 22:04

## 2024-07-03 RX ADMIN — PRIMIDONE 50 MG: 50 TABLET ORAL at 21:51

## 2024-07-03 RX ADMIN — GABAPENTIN 100 MG: 100 CAPSULE ORAL at 21:50

## 2024-07-03 RX ADMIN — ENOXAPARIN SODIUM 40 MG: 100 INJECTION SUBCUTANEOUS at 18:03

## 2024-07-03 ASSESSMENT — PAIN SCALES - GENERAL
PAINLEVEL_OUTOF10: 5
PAINLEVEL_OUTOF10: 5

## 2024-07-03 ASSESSMENT — PAIN DESCRIPTION - DESCRIPTORS: DESCRIPTORS: ACHING

## 2024-07-03 ASSESSMENT — PAIN DESCRIPTION - ORIENTATION: ORIENTATION: RIGHT;LEFT

## 2024-07-03 ASSESSMENT — PAIN - FUNCTIONAL ASSESSMENT: PAIN_FUNCTIONAL_ASSESSMENT: ACTIVITIES ARE NOT PREVENTED

## 2024-07-03 ASSESSMENT — PAIN DESCRIPTION - LOCATION
LOCATION: LEG
LOCATION: LEG;BACK

## 2024-07-03 NOTE — PLAN OF CARE
Problem: Physical Therapy - Adult  Goal: By Discharge: Performs mobility at highest level of function for planned discharge setting.  See evaluation for individualized goals.  Description: FUNCTIONAL STATUS PRIOR TO ADMISSION: Patient was modified independent using a wheelchair for functional mobility.    HOME SUPPORT PRIOR TO ADMISSION: The patient lives at [facility].     Physical Therapy Goals  Initiated 7/3/2024  1.  Patient will move from supine to sit and sit to supine in bed with supervision/set-up within 7 day(s).    2.  Patient will perform sit to stand with supervision/set-up within 7 day(s).  3.  Patient will transfer from bed to chair and chair to bed with supervision/set-up using the least restrictive device within 7 day(s).  4.  Patient will ambulate with contact guard assist for 25 feet with the least restrictive device within 7 day(s).     Outcome: Progressing     PHYSICAL THERAPY EVALUATION    Patient: Siomara Collins (79 y.o. female)  Date: 7/3/2024  Primary Diagnosis: UTI (urinary tract infection) [N39.0]  Hyperglycemia [R73.9]  Acute kidney injury (HCC) [N17.9]  Urinary tract infection without hematuria, site unspecified [N39.0]       Precautions: Restrictions/Precautions: Fall Risk                      ASSESSMENT :   DEFICITS/IMPAIRMENTS:   The patient is limited by decreased functional mobility, strength, balance s/p admission for UTI. Patient cleared by nursing to mobilize. Received patient sitting up in bed, very pleasant and motivated to participate with therapy. Patient reports she is able to perform transfers on her own to get to/from bed, wheelchair and toilet has not ambulated in 1 year since going to [facility], but her goal is to be able to ambulate short distances in order to get into her bathroom.     Based on the impairments listed above the patient is currently needing contact guard assist for bed mobility with HOB elevated, contact guard assist for sit<>stand transfer, and minimum assistance

## 2024-07-03 NOTE — PLAN OF CARE
Problem: Occupational Therapy - Adult  Goal: By Discharge: Performs self-care activities at highest level of function for planned discharge setting.  See evaluation for individualized goals.  Description: FUNCTIONAL STATUS PRIOR TO ADMISSION:  stand turns without assist to and from surfaces, propels wheelchair mostly backwards with her feet, sits on shower chair to bathe with supervision/assist from staff, performed all other ADLS on her own, is able to transport herself to the dining arenas on her own going backwards, scooter has been ordered but not delivered, pt has a strong desire to returning to do some walking   Receives Help From: Personal care attendant, ADL Assistance: Needs assistance,  ,  ,  ,  ,  ,  , Ambulation Assistance: Non-ambulatory, Transfer Assistance: Independent, Active : No     HOME SUPPORT: Patient lived at [facility].    Occupational Therapy Goals:  Initiated 7/3/2024  1.  Patient will perform grooming standing with seated rest breaks as needed with with Supervision within 7 day(s).  2.  Patient will perform toileting with Modified [place] within 7 day(s).  3.  Patient will perform lower body dressing with Modified [place] within 7 day(s).  4.  Patient will perform toilet transfers with Modified [place]  within 7 day(s).      Outcome: Progressing   OCCUPATIONAL THERAPY EVALUATION    Patient: Siomara Collins (79 y.o. female)  Date: 7/3/2024  Primary Diagnosis: UTI (urinary tract infection) [N39.0]  Hyperglycemia [R73.9]  Acute kidney injury (HCC) [N17.9]  Urinary tract infection without hematuria, site unspecified [N39.0]  UTI due to extended-spectrum beta lactamase (ESBL) producing Escherichia coli [N39.0, B96.29, Z16.12]         Precautions: Bed Alarm, Fall Risk, Contact Precautions (ESBL)                  ASSESSMENT :  The patient is limited by decreased functional mobility, independence in ADLs, ROM, strength, sensation, body mechanics, balance, posture.    Based on the

## 2024-07-04 LAB
ANION GAP SERPL CALC-SCNC: 5 MMOL/L (ref 5–15)
BASOPHILS # BLD: 0 K/UL (ref 0–0.1)
BASOPHILS NFR BLD: 0 % (ref 0–1)
BUN SERPL-MCNC: 24 MG/DL (ref 6–20)
BUN/CREAT SERPL: 35 (ref 12–20)
CALCIUM SERPL-MCNC: 8.6 MG/DL (ref 8.5–10.1)
CHLORIDE SERPL-SCNC: 107 MMOL/L (ref 97–108)
CO2 SERPL-SCNC: 27 MMOL/L (ref 21–32)
CREAT SERPL-MCNC: 0.68 MG/DL (ref 0.55–1.02)
DIFFERENTIAL METHOD BLD: ABNORMAL
EOSINOPHIL # BLD: 0 K/UL (ref 0–0.4)
EOSINOPHIL NFR BLD: 0 % (ref 0–7)
ERYTHROCYTE [DISTWIDTH] IN BLOOD BY AUTOMATED COUNT: 13.5 % (ref 11.5–14.5)
GLUCOSE BLD STRIP.AUTO-MCNC: 193 MG/DL (ref 65–117)
GLUCOSE BLD STRIP.AUTO-MCNC: 283 MG/DL (ref 65–117)
GLUCOSE BLD STRIP.AUTO-MCNC: 291 MG/DL (ref 65–117)
GLUCOSE BLD STRIP.AUTO-MCNC: 363 MG/DL (ref 65–117)
GLUCOSE SERPL-MCNC: 270 MG/DL (ref 65–100)
HCT VFR BLD AUTO: 40.1 % (ref 35–47)
HGB BLD-MCNC: 13.9 G/DL (ref 11.5–16)
IMM GRANULOCYTES # BLD AUTO: 0.1 K/UL (ref 0–0.04)
IMM GRANULOCYTES NFR BLD AUTO: 1 % (ref 0–0.5)
LYMPHOCYTES # BLD: 0.6 K/UL (ref 0.8–3.5)
LYMPHOCYTES NFR BLD: 8 % (ref 12–49)
MAGNESIUM SERPL-MCNC: 1.9 MG/DL (ref 1.6–2.4)
MCH RBC QN AUTO: 34.5 PG (ref 26–34)
MCHC RBC AUTO-ENTMCNC: 34.7 G/DL (ref 30–36.5)
MCV RBC AUTO: 99.5 FL (ref 80–99)
MONOCYTES # BLD: 0.3 K/UL (ref 0–1)
MONOCYTES NFR BLD: 4 % (ref 5–13)
NEUTS SEG # BLD: 6.4 K/UL (ref 1.8–8)
NEUTS SEG NFR BLD: 87 % (ref 32–75)
NRBC # BLD: 0 K/UL (ref 0–0.01)
NRBC BLD-RTO: 0 PER 100 WBC
PLATELET # BLD AUTO: 156 K/UL (ref 150–400)
PMV BLD AUTO: 10.5 FL (ref 8.9–12.9)
POTASSIUM SERPL-SCNC: 4.2 MMOL/L (ref 3.5–5.1)
RBC # BLD AUTO: 4.03 M/UL (ref 3.8–5.2)
SERVICE CMNT-IMP: ABNORMAL
SODIUM SERPL-SCNC: 139 MMOL/L (ref 136–145)
WBC # BLD AUTO: 7.4 K/UL (ref 3.6–11)

## 2024-07-04 PROCEDURE — 2580000003 HC RX 258: Performed by: INTERNAL MEDICINE

## 2024-07-04 PROCEDURE — 36415 COLL VENOUS BLD VENIPUNCTURE: CPT

## 2024-07-04 PROCEDURE — 6370000000 HC RX 637 (ALT 250 FOR IP): Performed by: INTERNAL MEDICINE

## 2024-07-04 PROCEDURE — 85025 COMPLETE CBC W/AUTO DIFF WBC: CPT

## 2024-07-04 PROCEDURE — 82962 GLUCOSE BLOOD TEST: CPT

## 2024-07-04 PROCEDURE — 1100000000 HC RM PRIVATE

## 2024-07-04 PROCEDURE — 6360000002 HC RX W HCPCS: Performed by: INTERNAL MEDICINE

## 2024-07-04 PROCEDURE — 6370000000 HC RX 637 (ALT 250 FOR IP): Performed by: NURSE PRACTITIONER

## 2024-07-04 PROCEDURE — 80048 BASIC METABOLIC PNL TOTAL CA: CPT

## 2024-07-04 PROCEDURE — 83735 ASSAY OF MAGNESIUM: CPT

## 2024-07-04 RX ADMIN — SODIUM CHLORIDE, PRESERVATIVE FREE 10 ML: 5 INJECTION INTRAVENOUS at 09:31

## 2024-07-04 RX ADMIN — GABAPENTIN 100 MG: 100 CAPSULE ORAL at 09:23

## 2024-07-04 RX ADMIN — MEROPENEM 1000 MG: 1 INJECTION, POWDER, FOR SOLUTION INTRAVENOUS at 12:26

## 2024-07-04 RX ADMIN — INSULIN LISPRO 4 UNITS: 100 INJECTION, SOLUTION INTRAVENOUS; SUBCUTANEOUS at 21:53

## 2024-07-04 RX ADMIN — PRIMIDONE 50 MG: 50 TABLET ORAL at 21:48

## 2024-07-04 RX ADMIN — GABAPENTIN 100 MG: 100 CAPSULE ORAL at 21:48

## 2024-07-04 RX ADMIN — DICLOFENAC SODIUM 2 G: 10 GEL TOPICAL at 09:30

## 2024-07-04 RX ADMIN — CARVEDILOL 12.5 MG: 12.5 TABLET, FILM COATED ORAL at 19:46

## 2024-07-04 RX ADMIN — SODIUM CHLORIDE: 9 INJECTION, SOLUTION INTRAVENOUS at 12:25

## 2024-07-04 RX ADMIN — INSULIN LISPRO 4 UNITS: 100 INJECTION, SOLUTION INTRAVENOUS; SUBCUTANEOUS at 12:58

## 2024-07-04 RX ADMIN — DICLOFENAC SODIUM 2 G: 10 GEL TOPICAL at 22:41

## 2024-07-04 RX ADMIN — INSULIN LISPRO 4 UNITS: 100 INJECTION, SOLUTION INTRAVENOUS; SUBCUTANEOUS at 17:19

## 2024-07-04 RX ADMIN — INSULIN GLARGINE 40 UNITS: 100 INJECTION, SOLUTION SUBCUTANEOUS at 09:28

## 2024-07-04 RX ADMIN — ENOXAPARIN SODIUM 40 MG: 100 INJECTION SUBCUTANEOUS at 19:42

## 2024-07-04 RX ADMIN — SODIUM CHLORIDE, PRESERVATIVE FREE 10 ML: 5 INJECTION INTRAVENOUS at 21:52

## 2024-07-04 RX ADMIN — PANTOPRAZOLE SODIUM 40 MG: 40 TABLET, DELAYED RELEASE ORAL at 06:20

## 2024-07-04 RX ADMIN — Medication 1000 UNITS: at 09:28

## 2024-07-04 RX ADMIN — INSULIN GLARGINE 10 UNITS: 100 INJECTION, SOLUTION SUBCUTANEOUS at 21:53

## 2024-07-04 RX ADMIN — GABAPENTIN 100 MG: 100 CAPSULE ORAL at 16:25

## 2024-07-04 RX ADMIN — SODIUM CHLORIDE: 9 INJECTION, SOLUTION INTRAVENOUS at 22:35

## 2024-07-04 RX ADMIN — MEROPENEM 1000 MG: 1 INJECTION, POWDER, FOR SOLUTION INTRAVENOUS at 22:35

## 2024-07-04 RX ADMIN — PRIMIDONE 50 MG: 50 TABLET ORAL at 09:24

## 2024-07-04 RX ADMIN — ACETAMINOPHEN 650 MG: 325 TABLET ORAL at 21:47

## 2024-07-04 RX ADMIN — PREDNISONE 30 MG: 20 TABLET ORAL at 09:28

## 2024-07-04 RX ADMIN — MONTELUKAST 10 MG: 10 TABLET, FILM COATED ORAL at 21:48

## 2024-07-04 RX ADMIN — DICLOFENAC SODIUM 2 G: 10 GEL TOPICAL at 12:27

## 2024-07-04 RX ADMIN — DICLOFENAC SODIUM 2 G: 10 GEL TOPICAL at 00:20

## 2024-07-04 RX ADMIN — CARVEDILOL 12.5 MG: 12.5 TABLET, FILM COATED ORAL at 09:29

## 2024-07-04 RX ADMIN — ROSUVASTATIN 20 MG: 20 TABLET, FILM COATED ORAL at 21:48

## 2024-07-04 ASSESSMENT — PAIN SCALES - GENERAL
PAINLEVEL_OUTOF10: 5

## 2024-07-04 ASSESSMENT — PAIN DESCRIPTION - DESCRIPTORS
DESCRIPTORS: ACHING

## 2024-07-04 ASSESSMENT — PAIN DESCRIPTION - LOCATION
LOCATION: LEG

## 2024-07-04 ASSESSMENT — PAIN DESCRIPTION - ORIENTATION
ORIENTATION: RIGHT;LEFT;LOWER
ORIENTATION: RIGHT;LEFT

## 2024-07-04 ASSESSMENT — PAIN - FUNCTIONAL ASSESSMENT
PAIN_FUNCTIONAL_ASSESSMENT: ACTIVITIES ARE NOT PREVENTED

## 2024-07-04 NOTE — PLAN OF CARE
Problem: Discharge Planning  Goal: Discharge to home or other facility with appropriate resources  Outcome: Progressing     Problem: Safety - Adult  Goal: Free from fall injury  Outcome: Progressing     Problem: Pain  Goal: Verbalizes/displays adequate comfort level or baseline comfort level  Outcome: Progressing     Problem: Occupational Therapy - Adult  Goal: By Discharge: Performs self-care activities at highest level of function for planned discharge setting.  See evaluation for individualized goals.  Description: FUNCTIONAL STATUS PRIOR TO ADMISSION:  stand turns without assist to and from surfaces, propels wheelchair mostly backwards with her feet, sits on shower chair to bathe with supervision/assist from staff, performed all other ADLS on her own, is able to transport herself to the dining arenas on her own going backwards, scooter has been ordered but not delivered, pt has a strong desire to returning to do some walking   Receives Help From: Personal care attendant, ADL Assistance: Needs assistance,  ,  ,  ,  ,  ,  , Ambulation Assistance: Non-ambulatory, Transfer Assistance: Independent, Active : No     HOME SUPPORT: Patient lived at [facility].    Occupational Therapy Goals:  Initiated 7/3/2024  1.  Patient will perform grooming standing with seated rest breaks as needed with with Supervision within 7 day(s).  2.  Patient will perform toileting with Modified [place] within 7 day(s).  3.  Patient will perform lower body dressing with Modified [place] within 7 day(s).  4.  Patient will perform toilet transfers with Modified [place]  within 7 day(s).      7/3/2024 1549 by Terese Segura, OTR/L  Outcome: Progressing  7/3/2024 1541 by Terese Segura, OTR/L  Outcome: Not Progressing     Problem: Occupational Therapy - Adult  Goal: By Discharge: Performs self-care activities at highest level of function for planned discharge setting.  See evaluation for individualized goals.  Description:

## 2024-07-05 LAB
ANION GAP SERPL CALC-SCNC: 5 MMOL/L (ref 5–15)
BACTERIA SPEC CULT: ABNORMAL
BASOPHILS # BLD: 0 K/UL (ref 0–0.1)
BASOPHILS NFR BLD: 0 % (ref 0–1)
BUN SERPL-MCNC: 21 MG/DL (ref 6–20)
BUN/CREAT SERPL: 30 (ref 12–20)
CALCIUM SERPL-MCNC: 8.3 MG/DL (ref 8.5–10.1)
CC UR VC: ABNORMAL
CHLORIDE SERPL-SCNC: 108 MMOL/L (ref 97–108)
CO2 SERPL-SCNC: 26 MMOL/L (ref 21–32)
CREAT SERPL-MCNC: 0.69 MG/DL (ref 0.55–1.02)
DIFFERENTIAL METHOD BLD: ABNORMAL
EOSINOPHIL # BLD: 0 K/UL (ref 0–0.4)
EOSINOPHIL NFR BLD: 0 % (ref 0–7)
ERYTHROCYTE [DISTWIDTH] IN BLOOD BY AUTOMATED COUNT: 13.7 % (ref 11.5–14.5)
GLUCOSE BLD STRIP.AUTO-MCNC: 170 MG/DL (ref 65–117)
GLUCOSE BLD STRIP.AUTO-MCNC: 259 MG/DL (ref 65–117)
GLUCOSE BLD STRIP.AUTO-MCNC: 296 MG/DL (ref 65–117)
GLUCOSE BLD STRIP.AUTO-MCNC: 302 MG/DL (ref 65–117)
GLUCOSE BLD STRIP.AUTO-MCNC: 313 MG/DL (ref 65–117)
GLUCOSE BLD STRIP.AUTO-MCNC: 322 MG/DL (ref 65–117)
GLUCOSE BLD STRIP.AUTO-MCNC: 323 MG/DL (ref 65–117)
GLUCOSE SERPL-MCNC: 306 MG/DL (ref 65–100)
HCT VFR BLD AUTO: 38.2 % (ref 35–47)
HGB BLD-MCNC: 13.1 G/DL (ref 11.5–16)
IMM GRANULOCYTES # BLD AUTO: 0.1 K/UL (ref 0–0.04)
IMM GRANULOCYTES NFR BLD AUTO: 1 % (ref 0–0.5)
LYMPHOCYTES # BLD: 1 K/UL (ref 0.8–3.5)
LYMPHOCYTES NFR BLD: 13 % (ref 12–49)
MAGNESIUM SERPL-MCNC: 2.1 MG/DL (ref 1.6–2.4)
MCH RBC QN AUTO: 34.1 PG (ref 26–34)
MCHC RBC AUTO-ENTMCNC: 34.3 G/DL (ref 30–36.5)
MCV RBC AUTO: 99.5 FL (ref 80–99)
MONOCYTES # BLD: 0.5 K/UL (ref 0–1)
MONOCYTES NFR BLD: 6 % (ref 5–13)
NEUTS SEG # BLD: 6.1 K/UL (ref 1.8–8)
NEUTS SEG NFR BLD: 80 % (ref 32–75)
NRBC # BLD: 0 K/UL (ref 0–0.01)
NRBC BLD-RTO: 0 PER 100 WBC
PHOSPHATE SERPL-MCNC: 2.7 MG/DL (ref 2.6–4.7)
PLATELET # BLD AUTO: 162 K/UL (ref 150–400)
PMV BLD AUTO: 10.6 FL (ref 8.9–12.9)
POTASSIUM SERPL-SCNC: 4 MMOL/L (ref 3.5–5.1)
RBC # BLD AUTO: 3.84 M/UL (ref 3.8–5.2)
SERVICE CMNT-IMP: ABNORMAL
SODIUM SERPL-SCNC: 139 MMOL/L (ref 136–145)
WBC # BLD AUTO: 7.6 K/UL (ref 3.6–11)

## 2024-07-05 PROCEDURE — 6370000000 HC RX 637 (ALT 250 FOR IP): Performed by: INTERNAL MEDICINE

## 2024-07-05 PROCEDURE — 1100000000 HC RM PRIVATE

## 2024-07-05 PROCEDURE — 2580000003 HC RX 258: Performed by: INTERNAL MEDICINE

## 2024-07-05 PROCEDURE — 6360000002 HC RX W HCPCS: Performed by: INTERNAL MEDICINE

## 2024-07-05 PROCEDURE — 84100 ASSAY OF PHOSPHORUS: CPT

## 2024-07-05 PROCEDURE — 83735 ASSAY OF MAGNESIUM: CPT

## 2024-07-05 PROCEDURE — 36415 COLL VENOUS BLD VENIPUNCTURE: CPT

## 2024-07-05 PROCEDURE — 80048 BASIC METABOLIC PNL TOTAL CA: CPT

## 2024-07-05 PROCEDURE — 85025 COMPLETE CBC W/AUTO DIFF WBC: CPT

## 2024-07-05 PROCEDURE — 82962 GLUCOSE BLOOD TEST: CPT

## 2024-07-05 RX ORDER — INSULIN GLARGINE 100 [IU]/ML
45 INJECTION, SOLUTION SUBCUTANEOUS EVERY MORNING
Status: DISCONTINUED | OUTPATIENT
Start: 2024-07-06 | End: 2024-07-07

## 2024-07-05 RX ADMIN — MEROPENEM 1000 MG: 1 INJECTION INTRAVENOUS at 10:01

## 2024-07-05 RX ADMIN — DICLOFENAC SODIUM 2 G: 10 GEL TOPICAL at 09:32

## 2024-07-05 RX ADMIN — PRIMIDONE 50 MG: 50 TABLET ORAL at 21:19

## 2024-07-05 RX ADMIN — INSULIN LISPRO 4 UNITS: 100 INJECTION, SOLUTION INTRAVENOUS; SUBCUTANEOUS at 12:35

## 2024-07-05 RX ADMIN — DICLOFENAC SODIUM 2 G: 10 GEL TOPICAL at 17:56

## 2024-07-05 RX ADMIN — MEROPENEM 1000 MG: 1 INJECTION INTRAVENOUS at 18:08

## 2024-07-05 RX ADMIN — Medication 1000 UNITS: at 09:26

## 2024-07-05 RX ADMIN — INSULIN LISPRO 4 UNITS: 100 INJECTION, SOLUTION INTRAVENOUS; SUBCUTANEOUS at 21:18

## 2024-07-05 RX ADMIN — PANTOPRAZOLE SODIUM 40 MG: 40 TABLET, DELAYED RELEASE ORAL at 06:59

## 2024-07-05 RX ADMIN — ROSUVASTATIN 20 MG: 20 TABLET, FILM COATED ORAL at 21:19

## 2024-07-05 RX ADMIN — INSULIN GLARGINE 10 UNITS: 100 INJECTION, SOLUTION SUBCUTANEOUS at 21:19

## 2024-07-05 RX ADMIN — PRIMIDONE 50 MG: 50 TABLET ORAL at 09:26

## 2024-07-05 RX ADMIN — GABAPENTIN 100 MG: 100 CAPSULE ORAL at 21:18

## 2024-07-05 RX ADMIN — GABAPENTIN 100 MG: 100 CAPSULE ORAL at 17:55

## 2024-07-05 RX ADMIN — INSULIN GLARGINE 40 UNITS: 100 INJECTION, SOLUTION SUBCUTANEOUS at 09:27

## 2024-07-05 RX ADMIN — MONTELUKAST 10 MG: 10 TABLET, FILM COATED ORAL at 21:19

## 2024-07-05 RX ADMIN — CARVEDILOL 12.5 MG: 12.5 TABLET, FILM COATED ORAL at 17:55

## 2024-07-05 RX ADMIN — PREDNISONE 30 MG: 20 TABLET ORAL at 09:26

## 2024-07-05 RX ADMIN — ACETAMINOPHEN 650 MG: 325 TABLET ORAL at 09:26

## 2024-07-05 RX ADMIN — SODIUM CHLORIDE, PRESERVATIVE FREE 10 ML: 5 INJECTION INTRAVENOUS at 21:19

## 2024-07-05 RX ADMIN — GABAPENTIN 100 MG: 100 CAPSULE ORAL at 09:26

## 2024-07-05 RX ADMIN — CARVEDILOL 12.5 MG: 12.5 TABLET, FILM COATED ORAL at 09:26

## 2024-07-05 RX ADMIN — SODIUM CHLORIDE, PRESERVATIVE FREE 10 ML: 5 INJECTION INTRAVENOUS at 09:28

## 2024-07-05 RX ADMIN — INSULIN LISPRO 6 UNITS: 100 INJECTION, SOLUTION INTRAVENOUS; SUBCUTANEOUS at 17:55

## 2024-07-05 RX ADMIN — ENOXAPARIN SODIUM 40 MG: 100 INJECTION SUBCUTANEOUS at 17:55

## 2024-07-05 ASSESSMENT — PAIN DESCRIPTION - LOCATION
LOCATION: BACK;LEG
LOCATION: LEG;BACK

## 2024-07-05 ASSESSMENT — PAIN SCALES - GENERAL
PAINLEVEL_OUTOF10: 6
PAINLEVEL_OUTOF10: 0
PAINLEVEL_OUTOF10: 3
PAINLEVEL_OUTOF10: 5
PAINLEVEL_OUTOF10: 3

## 2024-07-05 NOTE — CARE COORDINATION
Transition of Care Plan:    RUR: 24% High Risk  Prior Level of Functioning: Assistance with ADL's  Disposition: SNF  If SNF or IPR: Date FOC offered: 7/5/24  Date FOC received: 7/5/24  Accepting facility:   Follow up appointments: Defer to Rehab  DME needed: N/A  Transportation at discharge: [facility]  IM/IMM Medicare/ letter given: 2 IM Needed prior to d/c  Is patient a  and connected with VA? N/A  Caregiver Contact:   Jimi Verdin (Child)  [phone] (Mobile)   Discharge Caregiver contacted prior to discharge? Yes by pt  Care Conference needed? N/A  Barriers to discharge:  Medical Clearance and Placement    1:09PM Updated Note: Pt Has been accepted to [facility] and can d/c to facility on Monday 7/8    Initial Note: Chart Reviewed: Pt pending medical clearance and placement for d/c. Pt stated she would like to d/c to [facility] for rehab services and to complete IV ABX. CM has sent referral and pending acceptance. Pt would like to use [facility] ([phone] for d/c and will pay out of pocket. CM will continue to follow.     GRAY Rosas,  [phone]

## 2024-07-05 NOTE — PLAN OF CARE
Problem: Discharge Planning  Goal: Discharge to home or other facility with appropriate resources  7/5/2024 0057 by Nilda Ramirez RN  Outcome: Progressing  7/4/2024 1837 by Terese Caldwell RN  Outcome: Progressing     Problem: Safety - Adult  Goal: Free from fall injury  7/5/2024 0057 by Nilda Ramirez RN  Outcome: Progressing  7/4/2024 1837 by Terese Caldwell RN  Outcome: Progressing     Problem: Pain  Goal: Verbalizes/displays adequate comfort level or baseline comfort level  7/5/2024 0057 by Nilda Ramirez RN  Outcome: Progressing  7/4/2024 1837 by Terese Caldwell RN  Outcome: Progressing

## 2024-07-06 LAB
ANION GAP SERPL CALC-SCNC: 4 MMOL/L (ref 5–15)
BASOPHILS # BLD: 0 K/UL (ref 0–0.1)
BASOPHILS NFR BLD: 0 % (ref 0–1)
BUN SERPL-MCNC: 23 MG/DL (ref 6–20)
BUN/CREAT SERPL: 37 (ref 12–20)
CALCIUM SERPL-MCNC: 8.7 MG/DL (ref 8.5–10.1)
CHLORIDE SERPL-SCNC: 111 MMOL/L (ref 97–108)
CO2 SERPL-SCNC: 27 MMOL/L (ref 21–32)
CREAT SERPL-MCNC: 0.62 MG/DL (ref 0.55–1.02)
DIFFERENTIAL METHOD BLD: ABNORMAL
EOSINOPHIL # BLD: 0.1 K/UL (ref 0–0.4)
EOSINOPHIL NFR BLD: 1 % (ref 0–7)
ERYTHROCYTE [DISTWIDTH] IN BLOOD BY AUTOMATED COUNT: 13.9 % (ref 11.5–14.5)
GLUCOSE BLD STRIP.AUTO-MCNC: 217 MG/DL (ref 65–117)
GLUCOSE BLD STRIP.AUTO-MCNC: 228 MG/DL (ref 65–117)
GLUCOSE BLD STRIP.AUTO-MCNC: 321 MG/DL (ref 65–117)
GLUCOSE BLD STRIP.AUTO-MCNC: 349 MG/DL (ref 65–117)
GLUCOSE SERPL-MCNC: 233 MG/DL (ref 65–100)
HCT VFR BLD AUTO: 41.1 % (ref 35–47)
HGB BLD-MCNC: 14.1 G/DL (ref 11.5–16)
IMM GRANULOCYTES # BLD AUTO: 0.1 K/UL (ref 0–0.04)
IMM GRANULOCYTES NFR BLD AUTO: 1 % (ref 0–0.5)
LYMPHOCYTES # BLD: 1.5 K/UL (ref 0.8–3.5)
LYMPHOCYTES NFR BLD: 19 % (ref 12–49)
MCH RBC QN AUTO: 34.4 PG (ref 26–34)
MCHC RBC AUTO-ENTMCNC: 34.3 G/DL (ref 30–36.5)
MCV RBC AUTO: 100.2 FL (ref 80–99)
MONOCYTES # BLD: 0.5 K/UL (ref 0–1)
MONOCYTES NFR BLD: 7 % (ref 5–13)
NEUTS SEG # BLD: 5.4 K/UL (ref 1.8–8)
NEUTS SEG NFR BLD: 72 % (ref 32–75)
NRBC # BLD: 0 K/UL (ref 0–0.01)
NRBC BLD-RTO: 0 PER 100 WBC
PLATELET # BLD AUTO: 163 K/UL (ref 150–400)
PMV BLD AUTO: 10.6 FL (ref 8.9–12.9)
POTASSIUM SERPL-SCNC: 3.8 MMOL/L (ref 3.5–5.1)
RBC # BLD AUTO: 4.1 M/UL (ref 3.8–5.2)
SERVICE CMNT-IMP: ABNORMAL
SODIUM SERPL-SCNC: 142 MMOL/L (ref 136–145)
WBC # BLD AUTO: 7.5 K/UL (ref 3.6–11)

## 2024-07-06 PROCEDURE — 82962 GLUCOSE BLOOD TEST: CPT

## 2024-07-06 PROCEDURE — 2580000003 HC RX 258: Performed by: INTERNAL MEDICINE

## 2024-07-06 PROCEDURE — 6360000002 HC RX W HCPCS: Performed by: INTERNAL MEDICINE

## 2024-07-06 PROCEDURE — 85025 COMPLETE CBC W/AUTO DIFF WBC: CPT

## 2024-07-06 PROCEDURE — 36415 COLL VENOUS BLD VENIPUNCTURE: CPT

## 2024-07-06 PROCEDURE — 6370000000 HC RX 637 (ALT 250 FOR IP): Performed by: INTERNAL MEDICINE

## 2024-07-06 PROCEDURE — 80048 BASIC METABOLIC PNL TOTAL CA: CPT

## 2024-07-06 PROCEDURE — 1100000000 HC RM PRIVATE

## 2024-07-06 RX ADMIN — SODIUM CHLORIDE: 9 INJECTION, SOLUTION INTRAVENOUS at 12:02

## 2024-07-06 RX ADMIN — MEROPENEM 1000 MG: 1 INJECTION INTRAVENOUS at 02:21

## 2024-07-06 RX ADMIN — MEROPENEM 1000 MG: 1 INJECTION INTRAVENOUS at 18:12

## 2024-07-06 RX ADMIN — PREDNISONE 30 MG: 20 TABLET ORAL at 09:39

## 2024-07-06 RX ADMIN — Medication 1000 UNITS: at 09:39

## 2024-07-06 RX ADMIN — INSULIN LISPRO 6 UNITS: 100 INJECTION, SOLUTION INTRAVENOUS; SUBCUTANEOUS at 18:12

## 2024-07-06 RX ADMIN — INSULIN GLARGINE 45 UNITS: 100 INJECTION, SOLUTION SUBCUTANEOUS at 09:39

## 2024-07-06 RX ADMIN — DICLOFENAC SODIUM 2 G: 10 GEL TOPICAL at 22:00

## 2024-07-06 RX ADMIN — DICLOFENAC SODIUM 2 G: 10 GEL TOPICAL at 12:03

## 2024-07-06 RX ADMIN — ACETAMINOPHEN 650 MG: 325 TABLET ORAL at 13:27

## 2024-07-06 RX ADMIN — PANTOPRAZOLE SODIUM 40 MG: 40 TABLET, DELAYED RELEASE ORAL at 05:49

## 2024-07-06 RX ADMIN — INSULIN LISPRO 2 UNITS: 100 INJECTION, SOLUTION INTRAVENOUS; SUBCUTANEOUS at 13:26

## 2024-07-06 RX ADMIN — GABAPENTIN 100 MG: 100 CAPSULE ORAL at 21:52

## 2024-07-06 RX ADMIN — GABAPENTIN 100 MG: 100 CAPSULE ORAL at 09:39

## 2024-07-06 RX ADMIN — PRIMIDONE 50 MG: 50 TABLET ORAL at 21:53

## 2024-07-06 RX ADMIN — GABAPENTIN 100 MG: 100 CAPSULE ORAL at 14:29

## 2024-07-06 RX ADMIN — INSULIN LISPRO 2 UNITS: 100 INJECTION, SOLUTION INTRAVENOUS; SUBCUTANEOUS at 09:41

## 2024-07-06 RX ADMIN — SODIUM CHLORIDE, PRESERVATIVE FREE 10 ML: 5 INJECTION INTRAVENOUS at 09:40

## 2024-07-06 RX ADMIN — MONTELUKAST 10 MG: 10 TABLET, FILM COATED ORAL at 21:52

## 2024-07-06 RX ADMIN — ROSUVASTATIN 20 MG: 20 TABLET, FILM COATED ORAL at 21:51

## 2024-07-06 RX ADMIN — INSULIN GLARGINE 10 UNITS: 100 INJECTION, SOLUTION SUBCUTANEOUS at 21:53

## 2024-07-06 RX ADMIN — INSULIN LISPRO 4 UNITS: 100 INJECTION, SOLUTION INTRAVENOUS; SUBCUTANEOUS at 21:54

## 2024-07-06 RX ADMIN — ENOXAPARIN SODIUM 40 MG: 100 INJECTION SUBCUTANEOUS at 18:12

## 2024-07-06 RX ADMIN — CARVEDILOL 12.5 MG: 12.5 TABLET, FILM COATED ORAL at 09:39

## 2024-07-06 RX ADMIN — PRIMIDONE 50 MG: 50 TABLET ORAL at 09:39

## 2024-07-06 RX ADMIN — SODIUM CHLORIDE, PRESERVATIVE FREE 10 ML: 5 INJECTION INTRAVENOUS at 21:56

## 2024-07-06 RX ADMIN — MEROPENEM 1000 MG: 1 INJECTION INTRAVENOUS at 12:02

## 2024-07-06 RX ADMIN — CARVEDILOL 12.5 MG: 12.5 TABLET, FILM COATED ORAL at 18:12

## 2024-07-06 ASSESSMENT — PAIN DESCRIPTION - DESCRIPTORS: DESCRIPTORS: TINGLING;ACHING

## 2024-07-06 ASSESSMENT — PAIN DESCRIPTION - LOCATION: LOCATION: LEG

## 2024-07-06 ASSESSMENT — PAIN - FUNCTIONAL ASSESSMENT: PAIN_FUNCTIONAL_ASSESSMENT: PREVENTS OR INTERFERES SOME ACTIVE ACTIVITIES AND ADLS

## 2024-07-06 ASSESSMENT — PAIN SCALES - GENERAL
PAINLEVEL_OUTOF10: 3
PAINLEVEL_OUTOF10: 0
PAINLEVEL_OUTOF10: 3
PAINLEVEL_OUTOF10: 4
PAINLEVEL_OUTOF10: 5
PAINLEVEL_OUTOF10: 5

## 2024-07-06 ASSESSMENT — PAIN DESCRIPTION - ORIENTATION: ORIENTATION: RIGHT;LEFT

## 2024-07-06 NOTE — PLAN OF CARE
Problem: Discharge Planning  Goal: Discharge to home or other facility with appropriate resources  7/6/2024 1107 by Janene Horvath GN  Outcome: Progressing  7/6/2024 0117 by Silvestre Brennan RN  Outcome: Progressing  Flowsheets (Taken 7/5/2024 2006)  Discharge to home or other facility with appropriate resources:   Identify barriers to discharge with patient and caregiver   Arrange for needed discharge resources and transportation as appropriate   Identify discharge learning needs (meds, wound care, etc)   Arrange for interpreters to assist at discharge as needed   Refer to discharge planning if patient needs post-hospital services based on physician order or complex needs related to functional status, cognitive ability or social support system     Problem: Safety - Adult  Goal: Free from fall injury  7/6/2024 1107 by Janene Horvath GN  Outcome: Progressing  7/6/2024 0117 by Silvestre Brennan, RN  Outcome: Progressing     Problem: Pain  Goal: Verbalizes/displays adequate comfort level or baseline comfort level  7/6/2024 1107 by Janene Horvath GN  Outcome: Progressing  Flowsheets (Taken 7/6/2024 0936)  Verbalizes/displays adequate comfort level or baseline comfort level:   Administer analgesics based on type and severity of pain and evaluate response   Assess pain using appropriate pain scale   Encourage patient to monitor pain and request assistance  7/6/2024 0117 by Silvestre Brennan RN  Outcome: Progressing

## 2024-07-06 NOTE — PLAN OF CARE
Problem: Discharge Planning  Goal: Discharge to home or other facility with appropriate resources  Outcome: Progressing  Flowsheets (Taken 7/5/2024 2006)  Discharge to home or other facility with appropriate resources:   Identify barriers to discharge with patient and caregiver   Arrange for needed discharge resources and transportation as appropriate   Identify discharge learning needs (meds, wound care, etc)   Arrange for interpreters to assist at discharge as needed   Refer to discharge planning if patient needs post-hospital services based on physician order or complex needs related to functional status, cognitive ability or social support system     Problem: Safety - Adult  Goal: Free from fall injury  Outcome: Progressing     Problem: Pain  Goal: Verbalizes/displays adequate comfort level or baseline comfort level  Outcome: Progressing

## 2024-07-07 LAB
ANION GAP SERPL CALC-SCNC: 4 MMOL/L (ref 5–15)
BASOPHILS # BLD: 0 K/UL (ref 0–0.1)
BASOPHILS NFR BLD: 0 % (ref 0–1)
BUN SERPL-MCNC: 24 MG/DL (ref 6–20)
BUN/CREAT SERPL: 41 (ref 12–20)
CALCIUM SERPL-MCNC: 8.6 MG/DL (ref 8.5–10.1)
CHLORIDE SERPL-SCNC: 110 MMOL/L (ref 97–108)
CO2 SERPL-SCNC: 28 MMOL/L (ref 21–32)
CREAT SERPL-MCNC: 0.58 MG/DL (ref 0.55–1.02)
DIFFERENTIAL METHOD BLD: ABNORMAL
EOSINOPHIL # BLD: 0.1 K/UL (ref 0–0.4)
EOSINOPHIL NFR BLD: 1 % (ref 0–7)
ERYTHROCYTE [DISTWIDTH] IN BLOOD BY AUTOMATED COUNT: 14 % (ref 11.5–14.5)
GLUCOSE BLD STRIP.AUTO-MCNC: 149 MG/DL (ref 65–117)
GLUCOSE BLD STRIP.AUTO-MCNC: 232 MG/DL (ref 65–117)
GLUCOSE BLD STRIP.AUTO-MCNC: 238 MG/DL (ref 65–117)
GLUCOSE BLD STRIP.AUTO-MCNC: 353 MG/DL (ref 65–117)
GLUCOSE SERPL-MCNC: 168 MG/DL (ref 65–100)
HCT VFR BLD AUTO: 41.5 % (ref 35–47)
HGB BLD-MCNC: 13.9 G/DL (ref 11.5–16)
IMM GRANULOCYTES # BLD AUTO: 0.1 K/UL (ref 0–0.04)
IMM GRANULOCYTES NFR BLD AUTO: 1 % (ref 0–0.5)
LYMPHOCYTES # BLD: 1.9 K/UL (ref 0.8–3.5)
LYMPHOCYTES NFR BLD: 25 % (ref 12–49)
MCH RBC QN AUTO: 34.2 PG (ref 26–34)
MCHC RBC AUTO-ENTMCNC: 33.5 G/DL (ref 30–36.5)
MCV RBC AUTO: 102.2 FL (ref 80–99)
MONOCYTES # BLD: 0.6 K/UL (ref 0–1)
MONOCYTES NFR BLD: 7 % (ref 5–13)
NEUTS SEG # BLD: 5 K/UL (ref 1.8–8)
NEUTS SEG NFR BLD: 66 % (ref 32–75)
NRBC # BLD: 0 K/UL (ref 0–0.01)
NRBC BLD-RTO: 0 PER 100 WBC
PLATELET # BLD AUTO: 159 K/UL (ref 150–400)
PMV BLD AUTO: 10 FL (ref 8.9–12.9)
POTASSIUM SERPL-SCNC: 3.6 MMOL/L (ref 3.5–5.1)
RBC # BLD AUTO: 4.06 M/UL (ref 3.8–5.2)
SERVICE CMNT-IMP: ABNORMAL
SODIUM SERPL-SCNC: 142 MMOL/L (ref 136–145)
WBC # BLD AUTO: 7.7 K/UL (ref 3.6–11)

## 2024-07-07 PROCEDURE — 2580000003 HC RX 258: Performed by: INTERNAL MEDICINE

## 2024-07-07 PROCEDURE — 36415 COLL VENOUS BLD VENIPUNCTURE: CPT

## 2024-07-07 PROCEDURE — 6370000000 HC RX 637 (ALT 250 FOR IP): Performed by: INTERNAL MEDICINE

## 2024-07-07 PROCEDURE — 6360000002 HC RX W HCPCS: Performed by: INTERNAL MEDICINE

## 2024-07-07 PROCEDURE — 82962 GLUCOSE BLOOD TEST: CPT

## 2024-07-07 PROCEDURE — 85025 COMPLETE CBC W/AUTO DIFF WBC: CPT

## 2024-07-07 PROCEDURE — 1100000000 HC RM PRIVATE

## 2024-07-07 PROCEDURE — 80048 BASIC METABOLIC PNL TOTAL CA: CPT

## 2024-07-07 RX ORDER — GABAPENTIN 100 MG/1
100 CAPSULE ORAL DAILY
Status: DISCONTINUED | OUTPATIENT
Start: 2024-07-08 | End: 2024-07-07

## 2024-07-07 RX ORDER — GABAPENTIN 100 MG/1
200 CAPSULE ORAL NIGHTLY
Status: DISCONTINUED | OUTPATIENT
Start: 2024-07-07 | End: 2024-07-08 | Stop reason: HOSPADM

## 2024-07-07 RX ORDER — INSULIN GLARGINE 100 [IU]/ML
50 INJECTION, SOLUTION SUBCUTANEOUS EVERY MORNING
Status: DISCONTINUED | OUTPATIENT
Start: 2024-07-07 | End: 2024-07-08 | Stop reason: HOSPADM

## 2024-07-07 RX ORDER — GABAPENTIN 100 MG/1
100 CAPSULE ORAL 2 TIMES DAILY
Status: DISCONTINUED | OUTPATIENT
Start: 2024-07-07 | End: 2024-07-08 | Stop reason: HOSPADM

## 2024-07-07 RX ADMIN — MONTELUKAST 10 MG: 10 TABLET, FILM COATED ORAL at 22:03

## 2024-07-07 RX ADMIN — ENOXAPARIN SODIUM 40 MG: 100 INJECTION SUBCUTANEOUS at 18:30

## 2024-07-07 RX ADMIN — GABAPENTIN 200 MG: 100 CAPSULE ORAL at 22:03

## 2024-07-07 RX ADMIN — PRIMIDONE 50 MG: 50 TABLET ORAL at 22:07

## 2024-07-07 RX ADMIN — Medication 1000 UNITS: at 10:07

## 2024-07-07 RX ADMIN — SODIUM CHLORIDE, PRESERVATIVE FREE 10 ML: 5 INJECTION INTRAVENOUS at 10:18

## 2024-07-07 RX ADMIN — MEROPENEM 1000 MG: 1 INJECTION INTRAVENOUS at 10:17

## 2024-07-07 RX ADMIN — MEROPENEM 1000 MG: 1 INJECTION INTRAVENOUS at 02:05

## 2024-07-07 RX ADMIN — ROSUVASTATIN 20 MG: 20 TABLET, FILM COATED ORAL at 22:08

## 2024-07-07 RX ADMIN — CARVEDILOL 12.5 MG: 12.5 TABLET, FILM COATED ORAL at 18:30

## 2024-07-07 RX ADMIN — MEROPENEM 1000 MG: 1 INJECTION INTRAVENOUS at 18:36

## 2024-07-07 RX ADMIN — INSULIN LISPRO 2 UNITS: 100 INJECTION, SOLUTION INTRAVENOUS; SUBCUTANEOUS at 18:30

## 2024-07-07 RX ADMIN — INSULIN LISPRO 2 UNITS: 100 INJECTION, SOLUTION INTRAVENOUS; SUBCUTANEOUS at 13:05

## 2024-07-07 RX ADMIN — SODIUM CHLORIDE 25 ML: 9 INJECTION, SOLUTION INTRAVENOUS at 18:36

## 2024-07-07 RX ADMIN — INSULIN GLARGINE 10 UNITS: 100 INJECTION, SOLUTION SUBCUTANEOUS at 22:01

## 2024-07-07 RX ADMIN — GABAPENTIN 100 MG: 100 CAPSULE ORAL at 15:08

## 2024-07-07 RX ADMIN — PREDNISONE 30 MG: 20 TABLET ORAL at 10:07

## 2024-07-07 RX ADMIN — INSULIN GLARGINE 50 UNITS: 100 INJECTION, SOLUTION SUBCUTANEOUS at 10:08

## 2024-07-07 RX ADMIN — GABAPENTIN 100 MG: 100 CAPSULE ORAL at 10:08

## 2024-07-07 RX ADMIN — INSULIN LISPRO 4 UNITS: 100 INJECTION, SOLUTION INTRAVENOUS; SUBCUTANEOUS at 22:00

## 2024-07-07 RX ADMIN — PRIMIDONE 50 MG: 50 TABLET ORAL at 10:08

## 2024-07-07 RX ADMIN — DICLOFENAC SODIUM 2 G: 10 GEL TOPICAL at 15:11

## 2024-07-07 RX ADMIN — CARVEDILOL 12.5 MG: 12.5 TABLET, FILM COATED ORAL at 10:08

## 2024-07-07 RX ADMIN — SODIUM CHLORIDE, PRESERVATIVE FREE 10 ML: 5 INJECTION INTRAVENOUS at 22:08

## 2024-07-07 RX ADMIN — PANTOPRAZOLE SODIUM 40 MG: 40 TABLET, DELAYED RELEASE ORAL at 06:00

## 2024-07-07 RX ADMIN — SODIUM CHLORIDE 25 ML: 9 INJECTION, SOLUTION INTRAVENOUS at 10:16

## 2024-07-07 ASSESSMENT — PAIN SCALES - GENERAL
PAINLEVEL_OUTOF10: 6
PAINLEVEL_OUTOF10: 4

## 2024-07-07 ASSESSMENT — PAIN DESCRIPTION - LOCATION
LOCATION: LEG;BACK
LOCATION: BACK;LEG

## 2024-07-07 NOTE — PLAN OF CARE
Problem: Discharge Planning  Goal: Discharge to home or other facility with appropriate resources  7/7/2024 1057 by Janene Horvath GN  Outcome: Progressing  7/7/2024 0454 by Jeff Gonsales RN  Outcome: Progressing  7/7/2024 0431 by Jeff Gonsales RN  Outcome: Progressing  Flowsheets (Taken 7/6/2024 2126)  Discharge to home or other facility with appropriate resources: Identify barriers to discharge with patient and caregiver     Problem: Safety - Adult  Goal: Free from fall injury  7/7/2024 1057 by Janene Horvath GN  Outcome: Progressing  7/7/2024 0454 by Jeff Gonsales RN  Outcome: Progressing  7/7/2024 0431 by Jeff Gonsales RN  Outcome: Progressing     Problem: Pain  Goal: Verbalizes/displays adequate comfort level or baseline comfort level  7/7/2024 1057 by Janene Horvath GN  Outcome: Progressing  7/7/2024 0454 by Jeff Gonsales RN  Outcome: Progressing  7/7/2024 0431 by Jeff Gonsales RN  Outcome: Progressing  Flowsheets (Taken 7/6/2024 2126)  Verbalizes/displays adequate comfort level or baseline comfort level: Administer analgesics based on type and severity of pain and evaluate response

## 2024-07-07 NOTE — PLAN OF CARE
Problem: Discharge Planning  Goal: Discharge to home or other facility with appropriate resources  7/7/2024 0454 by Jeff Gonsales RN  Outcome: Progressing  7/7/2024 0431 by Jeff Gonsales RN  Outcome: Progressing  Flowsheets (Taken 7/6/2024 2126)  Discharge to home or other facility with appropriate resources: Identify barriers to discharge with patient and caregiver     Problem: Safety - Adult  Goal: Free from fall injury  7/7/2024 0454 by Jeff Gonsales RN  Outcome: Progressing  7/7/2024 0431 by Jeff Gonsales RN  Outcome: Progressing     Problem: Pain  Goal: Verbalizes/displays adequate comfort level or baseline comfort level  7/7/2024 0454 by Jeff Gonsales RN  Outcome: Progressing  7/7/2024 0431 by Jeff Gonsales RN  Outcome: Progressing  Flowsheets (Taken 7/6/2024 2126)  Verbalizes/displays adequate comfort level or baseline comfort level: Administer analgesics based on type and severity of pain and evaluate response

## 2024-07-08 VITALS
BODY MASS INDEX: 36.09 KG/M2 | DIASTOLIC BLOOD PRESSURE: 76 MMHG | OXYGEN SATURATION: 95 % | TEMPERATURE: 99.3 F | HEIGHT: 63 IN | HEART RATE: 75 BPM | RESPIRATION RATE: 16 BRPM | SYSTOLIC BLOOD PRESSURE: 127 MMHG | WEIGHT: 203.71 LBS

## 2024-07-08 LAB
ANION GAP SERPL CALC-SCNC: 4 MMOL/L (ref 5–15)
BASOPHILS # BLD: 0 K/UL (ref 0–0.1)
BASOPHILS NFR BLD: 0 % (ref 0–1)
BUN SERPL-MCNC: 23 MG/DL (ref 6–20)
BUN/CREAT SERPL: 37 (ref 12–20)
CALCIUM SERPL-MCNC: 8.8 MG/DL (ref 8.5–10.1)
CHLORIDE SERPL-SCNC: 111 MMOL/L (ref 97–108)
CO2 SERPL-SCNC: 28 MMOL/L (ref 21–32)
CREAT SERPL-MCNC: 0.63 MG/DL (ref 0.55–1.02)
DIFFERENTIAL METHOD BLD: ABNORMAL
EOSINOPHIL # BLD: 0.1 K/UL (ref 0–0.4)
EOSINOPHIL NFR BLD: 1 % (ref 0–7)
ERYTHROCYTE [DISTWIDTH] IN BLOOD BY AUTOMATED COUNT: 14 % (ref 11.5–14.5)
GLUCOSE BLD STRIP.AUTO-MCNC: 132 MG/DL (ref 65–117)
GLUCOSE BLD STRIP.AUTO-MCNC: 195 MG/DL (ref 65–117)
GLUCOSE BLD STRIP.AUTO-MCNC: 251 MG/DL (ref 65–117)
GLUCOSE SERPL-MCNC: 110 MG/DL (ref 65–100)
HCT VFR BLD AUTO: 41.9 % (ref 35–47)
HGB BLD-MCNC: 14 G/DL (ref 11.5–16)
IMM GRANULOCYTES # BLD AUTO: 0.1 K/UL (ref 0–0.04)
IMM GRANULOCYTES NFR BLD AUTO: 1 % (ref 0–0.5)
LYMPHOCYTES # BLD: 2.5 K/UL (ref 0.8–3.5)
LYMPHOCYTES NFR BLD: 34 % (ref 12–49)
MCH RBC QN AUTO: 34.2 PG (ref 26–34)
MCHC RBC AUTO-ENTMCNC: 33.4 G/DL (ref 30–36.5)
MCV RBC AUTO: 102.4 FL (ref 80–99)
MONOCYTES # BLD: 0.5 K/UL (ref 0–1)
MONOCYTES NFR BLD: 7 % (ref 5–13)
NEUTS SEG # BLD: 4.1 K/UL (ref 1.8–8)
NEUTS SEG NFR BLD: 57 % (ref 32–75)
NRBC # BLD: 0 K/UL (ref 0–0.01)
NRBC BLD-RTO: 0 PER 100 WBC
PLATELET # BLD AUTO: 163 K/UL (ref 150–400)
PMV BLD AUTO: 10.2 FL (ref 8.9–12.9)
POTASSIUM SERPL-SCNC: 3.5 MMOL/L (ref 3.5–5.1)
RBC # BLD AUTO: 4.09 M/UL (ref 3.8–5.2)
SERVICE CMNT-IMP: ABNORMAL
SODIUM SERPL-SCNC: 143 MMOL/L (ref 136–145)
WBC # BLD AUTO: 7.2 K/UL (ref 3.6–11)

## 2024-07-08 PROCEDURE — 36415 COLL VENOUS BLD VENIPUNCTURE: CPT

## 2024-07-08 PROCEDURE — 80048 BASIC METABOLIC PNL TOTAL CA: CPT

## 2024-07-08 PROCEDURE — 6370000000 HC RX 637 (ALT 250 FOR IP): Performed by: INTERNAL MEDICINE

## 2024-07-08 PROCEDURE — 2580000003 HC RX 258: Performed by: INTERNAL MEDICINE

## 2024-07-08 PROCEDURE — 6360000002 HC RX W HCPCS: Performed by: INTERNAL MEDICINE

## 2024-07-08 PROCEDURE — 85025 COMPLETE CBC W/AUTO DIFF WBC: CPT

## 2024-07-08 PROCEDURE — 82962 GLUCOSE BLOOD TEST: CPT

## 2024-07-08 RX ORDER — CARVEDILOL 3.12 MG/1
3.12 TABLET ORAL 2 TIMES DAILY WITH MEALS
Status: DISCONTINUED | OUTPATIENT
Start: 2024-07-08 | End: 2024-07-08

## 2024-07-08 RX ORDER — CARVEDILOL 3.12 MG/1
3.12 TABLET ORAL 2 TIMES DAILY WITH MEALS
Qty: 60 TABLET | Refills: 1 | Status: SHIPPED | OUTPATIENT
Start: 2024-07-08 | End: 2024-07-08 | Stop reason: HOSPADM

## 2024-07-08 RX ORDER — GABAPENTIN 100 MG/1
CAPSULE ORAL
Qty: 20 CAPSULE | Refills: 0 | Status: SHIPPED | OUTPATIENT
Start: 2024-07-08 | End: 2024-08-14

## 2024-07-08 RX ADMIN — DICLOFENAC SODIUM 2 G: 10 GEL TOPICAL at 09:07

## 2024-07-08 RX ADMIN — INSULIN LISPRO 4 UNITS: 100 INJECTION, SOLUTION INTRAVENOUS; SUBCUTANEOUS at 18:03

## 2024-07-08 RX ADMIN — CARVEDILOL 12.5 MG: 12.5 TABLET, FILM COATED ORAL at 08:56

## 2024-07-08 RX ADMIN — MEROPENEM 1000 MG: 1 INJECTION INTRAVENOUS at 09:18

## 2024-07-08 RX ADMIN — SODIUM CHLORIDE, PRESERVATIVE FREE 10 ML: 5 INJECTION INTRAVENOUS at 08:57

## 2024-07-08 RX ADMIN — PANTOPRAZOLE SODIUM 40 MG: 40 TABLET, DELAYED RELEASE ORAL at 06:45

## 2024-07-08 RX ADMIN — Medication 1000 UNITS: at 08:56

## 2024-07-08 RX ADMIN — MEROPENEM 1000 MG: 1 INJECTION INTRAVENOUS at 02:32

## 2024-07-08 RX ADMIN — PRIMIDONE 50 MG: 50 TABLET ORAL at 08:57

## 2024-07-08 RX ADMIN — GABAPENTIN 100 MG: 100 CAPSULE ORAL at 15:44

## 2024-07-08 RX ADMIN — PREDNISONE 30 MG: 20 TABLET ORAL at 08:56

## 2024-07-08 RX ADMIN — ENOXAPARIN SODIUM 40 MG: 100 INJECTION SUBCUTANEOUS at 18:03

## 2024-07-08 RX ADMIN — INSULIN GLARGINE 50 UNITS: 100 INJECTION, SOLUTION SUBCUTANEOUS at 08:55

## 2024-07-08 RX ADMIN — MEROPENEM 1000 MG: 1 INJECTION INTRAVENOUS at 15:47

## 2024-07-08 RX ADMIN — GABAPENTIN 100 MG: 100 CAPSULE ORAL at 08:56

## 2024-07-08 NOTE — PLAN OF CARE
Problem: Discharge Planning  Goal: Discharge to home or other facility with appropriate resources  7/8/2024 0057 by Jeff Gonsales RN  Outcome: Progressing  Flowsheets (Taken 7/7/2024 2001)  Discharge to home or other facility with appropriate resources: Identify barriers to discharge with patient and caregiver  7/7/2024 1057 by Janene Horvath GN  Outcome: Progressing     Problem: Safety - Adult  Goal: Free from fall injury  7/8/2024 0057 by Jeff Gonsales RN  Outcome: Progressing  7/7/2024 1057 by Janene Horvath GN  Outcome: Progressing     Problem: Pain  Goal: Verbalizes/displays adequate comfort level or baseline comfort level  7/8/2024 0057 by Jeff Gonsales RN  Outcome: Progressing  7/7/2024 1057 by Janene Horvath GN  Outcome: Progressing

## 2024-07-08 NOTE — DISCHARGE INSTRUCTIONS
Patient Discharge Instructions    Siomara Collins / [id number] : 1945    Admitted 2024 Discharged: 2024         DISCHARGE DIAGNOSIS:   Recurrent urinary tract infection  Urine culture growing gram-negative rods  Recent urine culture growing ESBL   Diabetes mellitus type 2 uncontrolled  CKD stage II  Hypokalemia  Polymyalgia rheumatica  Dyslipidemia  Hypertension  Diabetic neuropathy      Take Home Medications     {Medication reconciliation information is now added to the patient's AVS automatically when it is printed.  There is no need to use this SmartLink in discharge instructions.  Highlight this text and delete it to clear this message}      General drug facts     If you have a very bad allergy, wear an allergy ID at all times.   It is important that you take the medication exactly as they are prescribed.   Keep your medication in the bottles provided by the pharmacist.  Keep a list of all your drugs (prescription, natural products, vitamins, OTC) with you. Give this list to your doctor.  Do not take other medications without consulting your doctor.    Do not share your drugs with others and do not take anyone else's drugs.   Keep all drugs out of the reach of children and pets.    Most drugs may be thrown away in household trash after mixing with coffee grounds or sharon litter and sealing in a plastic bag.    Keep a list Call your doctor for help with any side effects. If in the [place] you may also call the FDA at 5-329-FDA-0014    Talk with the doctor before starting any new drug, including OTC, natural products, or vitamins.        What to do at Home    1. Recommended diet: Diabetic    2. Recommended activity: activity as tolerated    3. If you experience any of the following symptoms then please call your primary care physician or return to the emergency room if you cannot get hold of your doctor:    4. Wound Care: None    5. Lab work: Cbc and Bmp in 1 week     6.Bring these papers with you to

## 2024-07-08 NOTE — PROGRESS NOTES
Hospitalist Progress Note    NAME:   Siomara Collins   : 1945   MRN: [id number]     Date/Time: 2024 9:45 AM  Patient PCP: Irvin Vang MD    Estimated discharge date:   Barriers: Urine culture, PT OT recommending rehab        Assessment / Plan:  Recurrent urinary tract infection  Urine culture growing gram-negative rods  Recent urine culture growing ESBL  -Most recent urine culture from  growing Klebsiella pneumonia ESBL positive  -Recent CT abdomen on  shows small kidney cyst but no other acute process  -She currently reports some dysuria along with increased frequency but has no fever, leukocytosis.  UA shows 4+ bacteria  -Procalcitonin is 0.05 but she does have recurrent urinary tract infection along with dysuria and also urgency and positive urine culture.  -Ucx  : Klebsiella pneumoniae (ESBL), klebsiella pneumoniae (2nd colony type/strain)-ESBL  -pt is unable to get PICC placement due to hx of b/l mastectomy.  Unless a facility can do PIV for IV meropenum, she will need to stay in the hospital to complete a 10 day course IV meropenum.  So far she is on /10.  -CM following for placement.       Diabetes mellitus type 2 uncontrolled  - A1c of 10.2.  -cont' lantus, adjust prn    CKD stage II  -Baseline creatinine is 1.6-1. Cr is at baseline.    Hypokalemia  -replace as needed.  Wnl today     Polymyalgia rheumatica  Dyslipidemia  Hypertension  Diabetic neuropathy  -cont' home steroids.  Currently blood pressure is stable.    -Continue statin, Coreg and also gabapentin        Medical Decision Making:   I personally reviewed labs  I personally reviewed imaging  I personally reviewed EKG  Toxic drug monitoring: Monitor blood sugar while on prednisone due to diabetes, H&H while patient is on Lovenox  Discussed case with: Patient, RN        Code Status: Full code  DVT Prophylaxis: Lovenox  GI Prophylaxis: Protonix    Subjective:   No new complaint.     Objective:     VITALS:   Last 
      Hospitalist Progress Note    NAME:   Siomara Collins   : 1945   MRN: [id number]     Date/Time: 7/3/2024 1:23 PM  Patient PCP: Irvin Vang MD    Estimated discharge date:   Barriers: Urine culture      Assessment / Plan:    Recurrent urinary tract infection  Recent urine culture growing ESBL  -Most recent urine culture from  growing Klebsiella pneumonia ESBL positive  -Recent CT abdomen on  shows small kidney cyst but no other acute process  -She currently reports some dysuria along with increased frequency but has no fever, leukocytosis.  UA shows 4+ bacteria  -Continue meropenem.  Await for final urine culture results.  -May need midline and IV meropenem based on repeat urine culture for total of 7 to 10 days due to recurrent urinary tract infections.  -Procalcitonin is 0.05 but she does have recurrent urinary tract infection along with dysuria and also urgency and positive urine culture.     Diabetes mellitus type 2 uncontrolled  -This morning blood pressure was 99.  Continue Lantus 10 units at night and 40 units in the morning.  Continue insulin lispro sliding scale.     CKD stage II  -Baseline creatinine is 1.6-1.  Presented with a creatinine of 1.1.  Creatinine 1.6.  Check BMP in a.m.     Hypokalemia  -Potassium replaced.  Recheck in a.m.    Polymyalgia rheumatica  Dyslipidemia  Hypertension  Diabetic neuropathy  -Resume home steroids.  Currently blood pressure is stable.  Consider stress dose of steroids if blood pressure is low  -Continue statin, Coreg and also gabapentin      Medical Decision Making:   I personally reviewed labs: CBC, BMP  I personally reviewed imaging:  I personally reviewed EKG: Telemetry showing normal sinus rhythm  Toxic drug monitoring: Monitor blood sugar while on prednisone due to diabetes  Discussed case with: Pharmacy regarding meropenem        Code Status:   DVT Prophylaxis:   GI Prophylaxis:    Subjective:     Chief Complaint / Reason for Physician 
      Hospitalist Progress Note    NAME:   Siomara Collins   : 1945   MRN: [id number]     Date/Time: 2024 8:39 AM  Patient PCP: Irvin Vang MD    Estimated discharge date: stable for discharge however needs IV abx        Assessment / Plan:  Recurrent urinary tract infection  Urine culture growing gram-negative rods  Recent urine culture growing ESBL  -Most recent urine culture from  growing Klebsiella pneumonia ESBL positive  -Recent CT abdomen on  shows small kidney cyst but no other acute process  -She currently reports some dysuria along with increased frequency but has no fever, leukocytosis.  UA shows 4+ bacteria  -Procalcitonin is 0.05 but she does have recurrent urinary tract infection along with dysuria and also urgency and positive urine culture.  -Ucx  : Klebsiella pneumoniae (ESBL), klebsiella pneumoniae (2nd colony type/strain)-ESBL  -pt is unable to get PICC placement due to hx of b/l mastectomy.  Unless a facility can do PIV for IV meropenum, she will need to stay in the hospital to complete a 10 day course IV meropenum.  So far she is on D4/10.  -CM following for placement.       Diabetes mellitus type 2 uncontrolled  - A1c of 10.2.  -cont' lantus, incr AM dose for better BG control    CKD stage II  -Baseline creatinine is 1.6-1. Cr is at baseline.    Hypokalemia  -replace as needed.  Wnl today     Polymyalgia rheumatica  Dyslipidemia  Hypertension  Diabetic neuropathy  -cont' home steroids.  Currently blood pressure is stable.    -Continue statin, Coreg and also gabapentin        Medical Decision Making:   I personally reviewed labs  I personally reviewed imaging  I personally reviewed EKG  Toxic drug monitoring: Monitor blood sugar while on prednisone due to diabetes, H&H while patient is on Lovenox  Discussed case with: Patient, RN        Code Status: Full code  DVT Prophylaxis: Lovenox  GI Prophylaxis: Protonix    Subjective:   No new complaint.  Adjust gabapentin to 
      Hospitalist Progress Note    NAME:   Siomara Collins   : 1945   MRN: [id number]     Date/Time: 2024 9:02 AM  Patient PCP: Irvin Vang MD    Estimated discharge date:   Barriers: Urine culture, PT OT recommending rehab        Assessment / Plan:  Recurrent urinary tract infection  Urine culture growing gram-negative rods  Recent urine culture growing ESBL  -Most recent urine culture from  growing Klebsiella pneumonia ESBL positive  -Recent CT abdomen on  shows small kidney cyst but no other acute process  -She currently reports some dysuria along with increased frequency but has no fever, leukocytosis.  UA shows 4+ bacteria  -Continue meropenem.  Await for final urine culture results.  -May need midline and IV meropenem based on repeat urine culture for total of 7 to 10 days due to recurrent urinary tract infections.  -Procalcitonin is 0.05 but she does have recurrent urinary tract infection along with dysuria and also urgency and positive urine culture.  : Urine culture growing gram-negative rods.     Diabetes mellitus type 2 uncontrolled  -This morning blood pressure was 99.  Continue Lantus 10 units at night and 40 units in the morning.  Continue insulin lispro sliding scale.  : Blood glucose of 270, 193, A1c of 10.2.     CKD stage II  -Baseline creatinine is 1.6-1.  Presented with a creatinine of 1.1.  Creatinine 1.6.  Check BMP in a.m.  : Surprisingly patient's BUN/creatinine is 24/0.6 and may be is she has good functioning kidney at baseline.     Hypokalemia  -Potassium replaced.  Recheck in a.m.  : Potassium is 4.2.     Polymyalgia rheumatica  Dyslipidemia  Hypertension  Diabetic neuropathy  -Resume home steroids.  Currently blood pressure is stable.  Consider stress dose of steroids if blood pressure is low  -Continue statin, Coreg and also gabapentin        Medical Decision Making:   I personally reviewed labs: CBC, BMP, urine culture, A1c  I personally reviewed 
      Hospitalist Progress Note    NAME:   Siomara Collins   : 1945   MRN: [id number]     Date/Time: 2024 1:49 PM  Patient PCP: Irvin aVng MD    Estimated discharge date:   Barriers: Urine culture, PT OT recommending rehab        Assessment / Plan:  Recurrent urinary tract infection  Urine culture growing gram-negative rods  Recent urine culture growing ESBL  -Most recent urine culture from  growing Klebsiella pneumonia ESBL positive  -Recent CT abdomen on  shows small kidney cyst but no other acute process  -She currently reports some dysuria along with increased frequency but has no fever, leukocytosis.  UA shows 4+ bacteria  -Procalcitonin is 0.05 but she does have recurrent urinary tract infection along with dysuria and also urgency and positive urine culture.  -Ucx  : Klebsiella pneumoniae (ESBL), klebsiella pneumoniae (2nd colony type/strain)-ESBL  -will place PICC, pt will need a total of 10 days of IV meropenum.  So far she is on D3/10.  -stop IVF, pt is tolerating po intake.  -CM following for placement.       Diabetes mellitus type 2 uncontrolled  - A1c of 10.2.  -cont' lantus, incr AM dose as pt is hyperglycemic in the afternoon.     CKD stage II  -Baseline creatinine is 1.6-1. Cr is at baseline.    Hypokalemia  -replace as needed.  Wnl today     Polymyalgia rheumatica  Dyslipidemia  Hypertension  Diabetic neuropathy  -cont' home steroids.  Currently blood pressure is stable.    -Continue statin, Coreg and also gabapentin        Medical Decision Making:   I personally reviewed labs  I personally reviewed imaging  I personally reviewed EKG  Toxic drug monitoring: Monitor blood sugar while on prednisone due to diabetes, H&H while patient is on Lovenox  Discussed case with: Patient, RN        Code Status: Full code  DVT Prophylaxis: Lovenox  GI Prophylaxis: Protonix    Subjective:   No new complaint.  Wants to stay in the hospital for as long as possible to get IV abx. 
Admission Medication History Technician Note:    Hemodialysis patient: None    Patient preferred pharmacy Confirmed:    [facility] - [place], [place] - [address] - P [phone] - F [phone]  [facility] [address]  Phone: [phone] Fax: [phone]      Information obtained from¹: Patient, Rx Query, and [facility]    Does patient manage their medications: no    Comments/Recommendations: Updated PTA meds/reviewed patient's allergies.    1)  Medication issues identified: Patient has list of medications from [place].    2)  Medication changes (since last review):  Added  + Haloperidol liq.  + A&D oint      Removed      Adjusted      3)  Pertinent Pharmacy Findings:  Identified High Alert Medication Information  None     ¹RxQuery pharmacy benefit data reflects medications filled and processed through the patient's insurance, however this data does NOT capture whether the medication was picked up or is currently being taken by the patient.    Allergies:  Levaquin [levofloxacin], Metformin, Statins, Propoxyphene, Codeine, Penicillins, and Sulfa antibiotics    Chief Complaint for this Admission:    Chief Complaint   Patient presents with    Dysuria     Arrives to triage in wheelchair c/o urinary frequency and dysuria ongoing since January     Prior to Admission Medications:   Current Outpatient Medications   Medication Instructions    acetaminophen (TYLENOL) 650 mg, Oral, EVERY 4 HOURS PRN    carvedilol (COREG) 12.5 mg, Oral, 2 TIMES DAILY    diclofenac sodium (VOLTAREN) 2 g, Topical, 4 times daily    furosemide (LASIX) 20 mg, Oral, DAILY    gabapentin (NEURONTIN) 100 MG capsule Take 1 tablet twice a day and 2 tablets at bedtime    haloperidol (HALDOL) 2 MG/ML oral solution 0.75 mLs, Oral, EVERY 4 HOURS PRN    hydroCHLOROthiazide (HYDRODIURIL) 25 mg, Oral, DAILY    insulin glargine (LANTUS) 100 UNIT/ML injection vial Inject 40 units in the 
Bedside and Verbal shift change report given to Silvestre RN (oncoming nurse) by Jaja RN (offgoing nurse). Report included the following information Nurse Handoff Report, Index, Recent Results, Cardiac Rhythm NSR, Procedure Verification, and Quality Measures.     Pt packed up to leave pt was taken down stairs to meet transport. Tech stayed down with pt til 1905. Pt called transport and they told pt they had already arrived. Tech brought pt back in room.   
Bedside shift change report given to *** (oncoming nurse) by Nilda JHA (offgoing nurse). Report included the following information Nurse Handoff Report, Index, ED Encounter Summary, ED SBAR, Adult Overview, Surgery Report, Intake/Output, MAR, Recent Results, Med Rec Status, and Cardiac Rhythm (NSR) .    
Bedside shift change report given to Terese RN (oncoming nurse) by Nilda RN (offgoing nurse). Report included the following information Nurse Handoff Report, Index, ED Encounter Summary, ED SBAR, Adult Overview, Surgery Report, Intake/Output, MAR, Recent Results, Med Rec Status, and Cardiac Rhythm (NSR) .    
Chart reviewed for PCP hospital follow up. Patient's current DOV is 7/4/24. Patient is currently recommended for SNF. Pending patient discharge.  
Chart reviewed for PCP hospital follow up. Patient's current DOV is 7/6/24. Patient is currently recommended for SNF. Pending patient discharge.  
Eval complete and note to follow.  Recommend SNF at discharge.  Pt has no DME needs.  
Nursing contacted Nocturnist/cross cover provider via non-urgent messaging system [employer] and notified patient asking for voltaren gel for her legs, states she d/w dayshift attending provider already. No other concerns reported. No acute distress reported. No other information provided by nurse. VSS. Ordered voltaren gel qid prn legs per pt request. Will defer further evaluation/management to the day shift primary attending care team. Patient denies any further complaints or concerns. Nursing to notify Hospitalist for further/continued concerns. Will remain available overnight for further concerns if nursing/patient needs. Please note, there are RRT systems in this hospital in place that if nursing has acute or critical patient condition change or concern, this is to help facilitate and notify that patient needs immediate bedside evaluation by a provider.     Non-billable note.       
PICC line / Midline order cancelled due to history of bilateral mastectomy. Notified to Dr Brian and primary nurse.     Manish LE RN VAT  
Patient blood glucose 396. Notified MD.  MD responded to administer 8 units lispro sliding scale plus 4 units lispro for a total of 12 units.  
Physical Therapy    Chart reviewed up to date. Attempted to see pt for PT session Pt meeting with case management regarding upcoming discharge possibly today. Not available at this time. Will defer and follow up later as able.     Ivette Flynn PT, DPT, NCS  
Pt d/c [facility] came back and transported patient to [facility].   
Pt d/c with IV line  
Report given to Susan JHA at [address] [facility]   
Spiritual Care Assessment/Progress Note  [facility]    Name: Siomara Collins MRN: [id number]    Age: 79 y.o.     Sex: female   Language: English     Date: 7/5/2024            Total Time Calculated: 10 min              Spiritual Assessment begun in [facility] 3 MED TELE  Service Provided For: Patient  Referral/Consult From: Rounding  Encounter Overview/Reason: Initial Encounter    Spiritual beliefs:      [] Involved in a linda tradition/spiritual practice:      [] Supported by a linda community:      [] Claims no spiritual orientation:      [] Seeking spiritual identity:           [] Adheres to an individual form of spirituality:      [x] Not able to assess:                Identified resources for coping and support system:   Support System: Unknown       [] Prayer                  [] Devotional reading               [] Music                  [] Guided Imagery     [] Pet visits                                        [] Other: (COMMENT)     Specific area/focus of visit   Encounter:    Crisis:    Spiritual/Emotional needs: Type: Spiritual Support  Ritual, Rites and Sacraments:    Grief, Loss, and Adjustments:    Ethics/Mediation:    Behavioral Health:    Palliative Care:    Advance Care Planning:           Narrative:   Stepped into room and patient asked for her nurse. She shared that she is in pain and needed help from her nurse so I stepped out of the room and asked for her nurse to see the patient. The nurse went into room. The patient declined a  visit when I inquired. I departed room     LAQUITA Nevarez    paging service [phone]  
  -Continue statin, Coreg and also gabapentin        Medical Decision Making:   I personally reviewed labs  I personally reviewed imaging  I personally reviewed EKG  Toxic drug monitoring:   Discussed case with: Patient, RN        Code Status: Full code  DVT Prophylaxis: Lovenox  GI Prophylaxis: Protonix    Subjective:   Does not report any dysuria or urgency.  No abdominal pain.  No fever.  Borderline bradycardic but asymptomatic    Objective:     VITALS:   Last 24hrs VS reviewed since prior progress note. Most recent are:  Patient Vitals for the past 24 hrs:   BP Temp Temp src Pulse Resp SpO2 Weight   07/08/24 0756 (!) 143/79 97.9 °F (36.6 °C) Oral (!) 46 16 99 % --   07/08/24 0600 -- -- -- -- -- -- 92.4 kg (203 lb 11.3 oz)   07/08/24 0329 (!) 152/81 98.2 °F (36.8 °C) -- 56 -- 99 % --   07/07/24 2001 (!) 147/84 98.4 °F (36.9 °C) Oral 81 18 98 % --   07/07/24 1829 (!) 147/96 -- -- 74 -- 99 % --           Intake/Output Summary (Last 24 hours) at 7/8/2024 1431  Last data filed at 7/8/2024 0655  Gross per 24 hour   Intake --   Output 1250 ml   Net -1250 ml          I had a face to face encounter and independently examined this patient on 7/8/2024, as outlined below:  PHYSICAL EXAM:  General: Alert, obese, cooperative  EENT:  EOMI. Anicteric sclerae.  Resp:  CTA bilaterally, no wheezing or rales.  No accessory muscle use  CV:  Regular  rhythm,  No edema, bradycardic  GI:  Soft, Non distended, Non tender.  +Bowel sounds  Neurologic:  Alert and oriented X 3, normal speech   Psych:   Good insight. Not anxious nor agitated  Skin:  No rashes.  No jaundice    Reviewed most current lab test results and cultures  YES  Reviewed most current radiology test results   YES  Review and summation of old records today    NO  Reviewed patient's current orders and MAR    YES  PMH/SH reviewed - no change compared to H&P    Procedures: see electronic medical records for all procedures/Xrays and details which were not copied into this note

## 2024-07-08 NOTE — DISCHARGE SUMMARY
tablet  Commonly known as: COREG  Take 1 tablet by mouth with breakfast and with evening meal  What changed:   medication strength  how much to take  when to take this            CONTINUE taking these medications      acetaminophen 325 MG tablet  Commonly known as: TYLENOL     diclofenac sodium 1 % Gel  Commonly known as: VOLTAREN  Apply 2 g topically in the morning, at noon, in the evening, and at bedtime     furosemide 20 MG tablet  Commonly known as: Lasix  Take 1 tablet by mouth daily     gabapentin 100 MG capsule  Commonly known as: NEURONTIN  Take 1 tablet twice a day and 2 tablets at bedtime     haloperidol 2 MG/ML oral solution  Commonly known as: HALDOL     hydroCHLOROthiazide 25 MG tablet  Commonly known as: HYDRODIURIL     insulin glargine 100 UNIT/ML injection vial  Commonly known as: LANTUS  Inject 40 units in the morning and 10 units before dinner     insulin lispro 100 UNIT/ML Soln injection vial  Commonly known as: HUMALOG,ADMELOG     lanolin-petrolatum ointment     lidocaine 4 % external patch  Commonly known as: HM Lidocaine Patch  Place 1 patch onto the skin daily     lisinopril 10 MG tablet  Commonly known as: PRINIVIL;ZESTRIL     loperamide 2 MG capsule  Commonly known as: IMODIUM     montelukast 10 MG tablet  Commonly known as: SINGULAIR     omeprazole 20 MG delayed release capsule  Commonly known as: PRILOSEC  TAKE ONE CAPSULE BY MOUTH IN THE MORNING FOR GERD     polyethylene glycol 17 g packet  Commonly known as: GLYCOLAX     predniSONE 10 MG tablet  Commonly known as: DELTASONE  Take 3 tablets daily     primidone 50 MG tablet  Commonly known as: MYSOLINE  TAKE ONE TABLET BY MOUTH 2 TIMES A DAY FOR TREMORS     rosuvastatin 20 MG tablet  Commonly known as: Crestor  Take 1 tablet by mouth nightly     vitamin D3 25 MCG (1000 UT) Tabs tablet  Commonly known as: CHOLECALCIFEROL  Take 1 tablet by mouth daily               Where to Get Your Medications        [facility] to [facility]

## 2024-07-08 NOTE — PLAN OF CARE
Problem: Discharge Planning  Goal: Discharge to home or other facility with appropriate resources  7/8/2024 1927 by Jaja Weems RN  Outcome: Adequate for Discharge  7/8/2024 1121 by Jaja Weems RN  Outcome: Progressing  Flowsheets (Taken 7/8/2024 0900)  Discharge to home or other facility with appropriate resources: Identify barriers to discharge with patient and caregiver     Problem: Safety - Adult  Goal: Free from fall injury  7/8/2024 1927 by Jaja Weems RN  Outcome: Adequate for Discharge  7/8/2024 1121 by Jaja Weems RN  Outcome: Progressing     Problem: Pain  Goal: Verbalizes/displays adequate comfort level or baseline comfort level  7/8/2024 1927 by Jaja Weems RN  Outcome: Adequate for Discharge  7/8/2024 1121 by Jaja Weems RN  Outcome: Progressing

## 2024-07-08 NOTE — CARE COORDINATION
Transition of Care Plan:     RUR: 23% High Risk  Prior Level of Functioning: Assistance with ADL's  Disposition: SNF  If SNF or IPR: Date FOC offered: 7/5/24  Date FOC received: 7/5/24  Accepting facility:   [facility]  Report#[phone]  Room#127  Follow up appointments: Defer to Rehab  DME needed: N/A  Transportation at discharge: [facility]  IM/IMM Medicare/ letter given: 2 IM Given 7/8  Is patient a  and connected with [facility]? N/A  Caregiver Contact:   LambertJimi (Child)  [phone] (Mobile)   Discharge Caregiver contacted prior to discharge? Yes by pt  Care Conference needed? N/A  Barriers to discharge:  Approval for peripheral IV ABX        CM acknowledged d/c. Chart reviewed.  Pt will d/c to [facility] for SNF intervention. Pt has requested to use   [facility] and they will pick pt up @7:00pm. 2 IM letter given 7/8/24. CM will remain accessible if any needs arise prior to discharge.      Initial Note: Chart Reviewed: Pt pending approval of peripheral IV ABX from [facility]. PICC line / Midline order cancelled due to history of bilateral mastectomy. IV peripheral meropenum for 3 more days. CM will continue to follow.    Transition of Care Plan to SNF/Rehab    Communication to Patient/Family:  Met with patient and family and they are agreeable to the transition plan. The Plan for Transition of Care is related to the following treatment goals:     The Patient and/or patient representative was provided with a choice of provider and agrees  with the discharge plan.      Yes [x] No []    A Freedom of choice list was provided with basic dialogue that supports the patient's individualized plan of care/goals and shares the quality data associated with the providers.       Yes [x] No []    SNF/Rehab Transition:  Patient has been accepted to [facility] SNF/Rehab and meets criteria for admission.   Patient

## 2024-07-08 NOTE — PLAN OF CARE
Problem: Discharge Planning  Goal: Discharge to home or other facility with appropriate resources  7/8/2024 1121 by Jaja Weems RN  Outcome: Progressing  Flowsheets (Taken 7/8/2024 0900)  Discharge to home or other facility with appropriate resources: Identify barriers to discharge with patient and caregiver  7/8/2024 0057 by Jeff Gonsales RN  Outcome: Progressing  Flowsheets (Taken 7/7/2024 2001)  Discharge to home or other facility with appropriate resources: Identify barriers to discharge with patient and caregiver     Problem: Safety - Adult  Goal: Free from fall injury  7/8/2024 1121 by Jaja Weems RN  Outcome: Progressing  7/8/2024 0057 by Jeff Gonsales RN  Outcome: Progressing     Problem: Pain  Goal: Verbalizes/displays adequate comfort level or baseline comfort level  7/8/2024 1121 by Jaja Weems RN  Outcome: Progressing  7/8/2024 0057 by Jeff Gonsales RN  Outcome: Progressing

## 2024-07-09 ENCOUNTER — CARE COORDINATION (OUTPATIENT)
Dept: CARE COORDINATION | Age: 79
End: 2024-07-09

## 2024-07-09 NOTE — CARE COORDINATION
SECURE email notification sent to identified IDT members at   [facility]

## 2024-07-15 PROBLEM — E87.6 HYPOKALEMIA: Status: ACTIVE | Noted: 2024-07-15

## 2024-07-16 ENCOUNTER — OFFICE VISIT (OUTPATIENT)
Facility: CLINIC | Age: 79
End: 2024-07-16
Payer: MEDICARE

## 2024-07-16 VITALS
WEIGHT: 201.4 LBS | DIASTOLIC BLOOD PRESSURE: 88 MMHG | BODY MASS INDEX: 35.68 KG/M2 | HEART RATE: 82 BPM | TEMPERATURE: 97.9 F | RESPIRATION RATE: 18 BRPM | SYSTOLIC BLOOD PRESSURE: 134 MMHG | HEIGHT: 63 IN | OXYGEN SATURATION: 97 %

## 2024-07-16 DIAGNOSIS — N18.2 CONTROLLED TYPE 2 DIABETES MELLITUS WITH STAGE 2 CHRONIC KIDNEY DISEASE, WITH LONG-TERM CURRENT USE OF INSULIN (HCC): ICD-10-CM

## 2024-07-16 DIAGNOSIS — I12.9 HYPERTENSION WITH RENAL DISEASE: ICD-10-CM

## 2024-07-16 DIAGNOSIS — J84.9 INTERSTITIAL LUNG DISEASE (HCC): ICD-10-CM

## 2024-07-16 DIAGNOSIS — Z79.4 CONTROLLED TYPE 2 DIABETES MELLITUS WITH STAGE 2 CHRONIC KIDNEY DISEASE, WITH LONG-TERM CURRENT USE OF INSULIN (HCC): ICD-10-CM

## 2024-07-16 DIAGNOSIS — E78.2 MIXED HYPERLIPIDEMIA: ICD-10-CM

## 2024-07-16 DIAGNOSIS — G62.9 NEUROPATHY: ICD-10-CM

## 2024-07-16 DIAGNOSIS — E11.22 CONTROLLED TYPE 2 DIABETES MELLITUS WITH STAGE 2 CHRONIC KIDNEY DISEASE, WITH LONG-TERM CURRENT USE OF INSULIN (HCC): ICD-10-CM

## 2024-07-16 DIAGNOSIS — N39.0 COMPLICATED UTI (URINARY TRACT INFECTION): ICD-10-CM

## 2024-07-16 DIAGNOSIS — E66.01 SEVERE OBESITY (BMI 35.0-39.9) WITH COMORBIDITY (HCC): ICD-10-CM

## 2024-07-16 DIAGNOSIS — M35.3 POLYMYALGIA RHEUMATICA (HCC): ICD-10-CM

## 2024-07-16 DIAGNOSIS — N39.0 RECURRENT UTI: Primary | ICD-10-CM

## 2024-07-16 DIAGNOSIS — N18.2 CKD (CHRONIC KIDNEY DISEASE), STAGE II: ICD-10-CM

## 2024-07-16 DIAGNOSIS — R53.81 PHYSICAL DEBILITY: ICD-10-CM

## 2024-07-16 DIAGNOSIS — B37.31 MONILIAL VAGINITIS: ICD-10-CM

## 2024-07-16 DIAGNOSIS — E87.6 HYPOKALEMIA: ICD-10-CM

## 2024-07-16 PROCEDURE — G8400 PT W/DXA NO RESULTS DOC: HCPCS | Performed by: INTERNAL MEDICINE

## 2024-07-16 PROCEDURE — G8417 CALC BMI ABV UP PARAM F/U: HCPCS | Performed by: INTERNAL MEDICINE

## 2024-07-16 PROCEDURE — 99215 OFFICE O/P EST HI 40 MIN: CPT | Performed by: INTERNAL MEDICINE

## 2024-07-16 PROCEDURE — G8427 DOCREV CUR MEDS BY ELIG CLIN: HCPCS | Performed by: INTERNAL MEDICINE

## 2024-07-16 PROCEDURE — 3046F HEMOGLOBIN A1C LEVEL >9.0%: CPT | Performed by: INTERNAL MEDICINE

## 2024-07-16 PROCEDURE — 1090F PRES/ABSN URINE INCON ASSESS: CPT | Performed by: INTERNAL MEDICINE

## 2024-07-16 PROCEDURE — 1123F ACP DISCUSS/DSCN MKR DOCD: CPT | Performed by: INTERNAL MEDICINE

## 2024-07-16 PROCEDURE — 1036F TOBACCO NON-USER: CPT | Performed by: INTERNAL MEDICINE

## 2024-07-16 PROCEDURE — 1111F DSCHRG MED/CURRENT MED MERGE: CPT | Performed by: INTERNAL MEDICINE

## 2024-07-16 RX ORDER — FLUCONAZOLE 100 MG/1
100 TABLET ORAL DAILY
Qty: 7 TABLET | Refills: 0 | Status: SHIPPED | OUTPATIENT
Start: 2024-07-16 | End: 2024-07-23

## 2024-07-16 RX ORDER — SEMAGLUTIDE 0.68 MG/ML
INJECTION, SOLUTION SUBCUTANEOUS
Qty: 3 ML | Refills: 1 | Status: SHIPPED | OUTPATIENT
Start: 2024-07-16

## 2024-07-16 NOTE — PROGRESS NOTES
Siomara Collins is a 79 y.o. female     Chief Complaint   Patient presents with    Discuss power scooter     Discuss power scooter, med refill       BP (!) 150/90 (Site: Left Upper Arm, Position: Sitting, Cuff Size: Large Adult)   Pulse 82   Temp 97.9 °F (36.6 °C) (Oral)   Resp 18   Ht 1.6 m (5' 3\")   Wt 91.4 kg (201 lb 6.4 oz)   SpO2 97%   BMI 35.68 kg/m²     Health Maintenance Due   Topic Date Due    COVID-19 Vaccine (1) Never done    Shingles vaccine (1 of 2) Never done    DEXA (modify frequency per FRAX score)  Never done    Respiratory Syncytial Virus (RSV) Pregnant or age 60 yrs+ (1 - 1-dose 60+ series) Never done    Pneumococcal 65+ years Vaccine (2 of 2 - PPSV23 or PCV20) 09/21/2015         \"Have you been to the ER, urgent care clinic since your last visit?  Hospitalized since your last visit?\"    NO    “Have you seen or consulted any other health care providers outside of [facility] since your last visit?”    NO                    
proofread, it may and can contain electronic and spelling errors.  Other human spelling and other errors may be present.  Corrections may be executed at a later time.  Please feel free to contact us for any clarifications as needed.      No follow-up provider specified.      Irvin Vang MD    Orders Placed This Encounter   Procedures    Culture, Urine     Standing Status:   Future     Standing Expiration Date:   7/16/2025     Order Specific Question:   Specify (ex-cath, midstream, cysto, etc)?     Answer:   f    Urinalysis with Microscopic     Standing Status:   Future     Standing Expiration Date:   7/16/2025     Order Specific Question:   SPECIFY(EX-CATH,MIDSTREAM,CYSTO,ETC)?     Answer:   f    CBC with Auto Differential     Standing Status:   Future     Standing Expiration Date:   7/16/2025    Basic Metabolic Panel     Standing Status:   Future     Standing Expiration Date:   7/16/2025          No results found for any visits on 07/16/24.    I have reviewed the patient's medical history in detail and updated the computerized patient record.     We had a prolonged discussion about these complex clinical issues and went over the various important aspects to consider. All questions were answered.     Advised her to call back or return to office if symptoms do not improve, change in nature, or persist.    She was given an after visit summary or informed of [facility] [facility] which includes patient instructions, diagnoses, current medications, & vitals.  She expressed understanding with the diagnosis and plan.

## 2024-07-17 LAB
ANION GAP SERPL CALC-SCNC: 11 MMOL/L (ref 5–15)
APPEARANCE UR: ABNORMAL
BACTERIA URNS QL MICRO: ABNORMAL /HPF
BASOPHILS # BLD: 0 K/UL (ref 0–0.1)
BASOPHILS NFR BLD: 0 % (ref 0–1)
BILIRUB UR QL: NEGATIVE
BUN SERPL-MCNC: 27 MG/DL (ref 6–20)
BUN/CREAT SERPL: 25 (ref 12–20)
CALCIUM SERPL-MCNC: 9.3 MG/DL (ref 8.5–10.1)
CHLORIDE SERPL-SCNC: 100 MMOL/L (ref 97–108)
CO2 SERPL-SCNC: 26 MMOL/L (ref 21–32)
COLOR UR: ABNORMAL
CREAT SERPL-MCNC: 1.06 MG/DL (ref 0.55–1.02)
DIFFERENTIAL METHOD BLD: ABNORMAL
EOSINOPHIL # BLD: 0 K/UL (ref 0–0.4)
EOSINOPHIL NFR BLD: 0 % (ref 0–7)
EPITH CASTS URNS QL MICRO: ABNORMAL /LPF
ERYTHROCYTE [DISTWIDTH] IN BLOOD BY AUTOMATED COUNT: 14 % (ref 11.5–14.5)
GLUCOSE SERPL-MCNC: 446 MG/DL (ref 65–100)
GLUCOSE UR STRIP.AUTO-MCNC: >1000 MG/DL
HCT VFR BLD AUTO: 39.7 % (ref 35–47)
HGB BLD-MCNC: 13.7 G/DL (ref 11.5–16)
HGB UR QL STRIP: NEGATIVE
IMM GRANULOCYTES # BLD AUTO: 0.1 K/UL (ref 0–0.04)
IMM GRANULOCYTES NFR BLD AUTO: 1 % (ref 0–0.5)
KETONES UR QL STRIP.AUTO: NEGATIVE MG/DL
LEUKOCYTE ESTERASE UR QL STRIP.AUTO: ABNORMAL
LYMPHOCYTES # BLD: 0.3 K/UL (ref 0.8–3.5)
LYMPHOCYTES NFR BLD: 4 % (ref 12–49)
MCH RBC QN AUTO: 34.8 PG (ref 26–34)
MCHC RBC AUTO-ENTMCNC: 34.5 G/DL (ref 30–36.5)
MCV RBC AUTO: 100.8 FL (ref 80–99)
MONOCYTES # BLD: 0.3 K/UL (ref 0–1)
MONOCYTES NFR BLD: 4 % (ref 5–13)
NEUTS SEG # BLD: 6.6 K/UL (ref 1.8–8)
NEUTS SEG NFR BLD: 91 % (ref 32–75)
NITRITE UR QL STRIP.AUTO: NEGATIVE
NRBC # BLD: 0 K/UL (ref 0–0.01)
NRBC BLD-RTO: 0 PER 100 WBC
PH UR STRIP: 6 (ref 5–8)
PLATELET # BLD AUTO: 175 K/UL (ref 150–400)
PMV BLD AUTO: 10.6 FL (ref 8.9–12.9)
POTASSIUM SERPL-SCNC: 3.9 MMOL/L (ref 3.5–5.1)
PROT UR STRIP-MCNC: NEGATIVE MG/DL
RBC # BLD AUTO: 3.94 M/UL (ref 3.8–5.2)
RBC #/AREA URNS HPF: ABNORMAL /HPF (ref 0–5)
RBC MORPH BLD: ABNORMAL
SODIUM SERPL-SCNC: 137 MMOL/L (ref 136–145)
SP GR UR REFRACTOMETRY: 1.03 (ref 1–1.03)
UROBILINOGEN UR QL STRIP.AUTO: 1 EU/DL (ref 0.2–1)
WBC # BLD AUTO: 7.3 K/UL (ref 3.6–11)
WBC MORPH BLD: ABNORMAL
WBC URNS QL MICRO: ABNORMAL /HPF (ref 0–4)
YEAST BUDDING URNS QL: PRESENT

## 2024-07-19 DIAGNOSIS — B96.89 UTI DUE TO KLEBSIELLA SPECIES: Primary | ICD-10-CM

## 2024-07-19 DIAGNOSIS — N39.0 UTI DUE TO KLEBSIELLA SPECIES: Primary | ICD-10-CM

## 2024-07-19 LAB
BACTERIA SPEC CULT: ABNORMAL
CC UR VC: ABNORMAL
SERVICE CMNT-IMP: ABNORMAL

## 2024-07-19 RX ORDER — CEFUROXIME AXETIL 250 MG/1
250 TABLET ORAL 2 TIMES DAILY
Qty: 20 TABLET | Refills: 0 | Status: ON HOLD | OUTPATIENT
Start: 2024-07-19 | End: 2024-07-29

## 2024-07-24 ENCOUNTER — OFFICE VISIT (OUTPATIENT)
Facility: CLINIC | Age: 79
End: 2024-07-24
Payer: MEDICARE

## 2024-07-24 VITALS
HEIGHT: 63 IN | OXYGEN SATURATION: 95 % | DIASTOLIC BLOOD PRESSURE: 76 MMHG | RESPIRATION RATE: 16 BRPM | WEIGHT: 201.3 LBS | BODY MASS INDEX: 35.67 KG/M2 | SYSTOLIC BLOOD PRESSURE: 124 MMHG | HEART RATE: 72 BPM | TEMPERATURE: 98 F

## 2024-07-24 DIAGNOSIS — Z79.52 LONG TERM (CURRENT) USE OF SYSTEMIC STEROIDS: ICD-10-CM

## 2024-07-24 DIAGNOSIS — N39.0 RECURRENT UTI: ICD-10-CM

## 2024-07-24 DIAGNOSIS — T38.0X5A STEROID-INDUCED HYPERGLYCEMIA: ICD-10-CM

## 2024-07-24 DIAGNOSIS — E11.65 UNCONTROLLED TYPE 2 DIABETES MELLITUS WITH HYPERGLYCEMIA (HCC): ICD-10-CM

## 2024-07-24 DIAGNOSIS — L01.00 IMPETIGO: ICD-10-CM

## 2024-07-24 DIAGNOSIS — R58 ECCHYMOSIS: ICD-10-CM

## 2024-07-24 DIAGNOSIS — R01.1 HEART MURMUR: ICD-10-CM

## 2024-07-24 DIAGNOSIS — R73.9 STEROID-INDUCED HYPERGLYCEMIA: ICD-10-CM

## 2024-07-24 DIAGNOSIS — R60.9 EDEMA, UNSPECIFIED TYPE: Primary | ICD-10-CM

## 2024-07-24 PROCEDURE — 99214 OFFICE O/P EST MOD 30 MIN: CPT | Performed by: PHYSICIAN ASSISTANT

## 2024-07-24 PROCEDURE — G8400 PT W/DXA NO RESULTS DOC: HCPCS | Performed by: PHYSICIAN ASSISTANT

## 2024-07-24 PROCEDURE — G8417 CALC BMI ABV UP PARAM F/U: HCPCS | Performed by: PHYSICIAN ASSISTANT

## 2024-07-24 PROCEDURE — 3046F HEMOGLOBIN A1C LEVEL >9.0%: CPT | Performed by: PHYSICIAN ASSISTANT

## 2024-07-24 PROCEDURE — 1123F ACP DISCUSS/DSCN MKR DOCD: CPT | Performed by: PHYSICIAN ASSISTANT

## 2024-07-24 PROCEDURE — 1090F PRES/ABSN URINE INCON ASSESS: CPT | Performed by: PHYSICIAN ASSISTANT

## 2024-07-24 PROCEDURE — 1036F TOBACCO NON-USER: CPT | Performed by: PHYSICIAN ASSISTANT

## 2024-07-24 PROCEDURE — 1111F DSCHRG MED/CURRENT MED MERGE: CPT | Performed by: PHYSICIAN ASSISTANT

## 2024-07-24 PROCEDURE — G8427 DOCREV CUR MEDS BY ELIG CLIN: HCPCS | Performed by: PHYSICIAN ASSISTANT

## 2024-07-24 RX ORDER — FUROSEMIDE 20 MG/1
20 TABLET ORAL 2 TIMES DAILY
Qty: 10 TABLET | Refills: 0 | Status: ON HOLD | OUTPATIENT
Start: 2024-07-24 | End: 2024-07-29

## 2024-07-24 NOTE — PROGRESS NOTES
Siomara Collins is a 79 y.o. female     Chief Complaint   Patient presents with    Urinary Tract Infection    Leg Swelling     Bilateral leg swelling       /76 (Site: Left Upper Arm, Position: Sitting, Cuff Size: Medium Adult)   Pulse 72   Temp 98 °F (36.7 °C) (Oral)   Resp 16   Ht 1.6 m (5' 3\")   Wt 91.3 kg (201 lb 4.8 oz)   SpO2 95%   BMI 35.66 kg/m²     Health Maintenance Due   Topic Date Due    COVID-19 Vaccine (1) Never done    DEXA (modify frequency per FRAX score)  Never done    Respiratory Syncytial Virus (RSV) Pregnant or age 60 yrs+ (1 - 1-dose 60+ series) Never done    Shingles vaccine (2 of 3) 02/26/2017         \"Have you been to the ER, urgent care clinic since your last visit?  Hospitalized since your last visit?\"    NO    “Have you seen or consulted any other health care providers outside of [facility] since your last visit?”    NO                     
medications as prescribed    8.  Heart murmur heard on exam today at left upper sternal border  She is not aware of known heart murmur  Echocardiogram on chart reviewed from 3/14/2023 shows mitral valve mild annular calcification and mild regurgitation.  Normal left ventricular systolic function with estimated EF 55 to 60% with normal size and wall motion.  Since it has been one year since last echocardiogram, will repeat echo for evaluation at this time      Verbal and written instructions (see AVS) provided.  Patient expresses understanding of diagnosis and treatment plan.

## 2024-07-25 ENCOUNTER — TELEPHONE (OUTPATIENT)
Facility: CLINIC | Age: 79
End: 2024-07-25

## 2024-07-25 NOTE — TELEPHONE ENCOUNTER
Please call Ms. Collins to follow-up from yesterday's visit.  - Ask how her appointment went with uro-gynecologist Dr. Todd Martins yesterday  - Specifically, was she put on alternate antibiotic for her UTI symptoms, and if so, which antibiotic is she on?  If she is not getting treatment for this and she is still having symptoms, then she will need to go to the ER for readmission and IV antibiotics, due to Klebsiella infection on culture.

## 2024-07-26 ENCOUNTER — HOSPITAL ENCOUNTER (INPATIENT)
Facility: HOSPITAL | Age: 79
LOS: 7 days | Discharge: HOME | End: 2024-08-02
Attending: EMERGENCY MEDICINE | Admitting: STUDENT IN AN ORGANIZED HEALTH CARE EDUCATION/TRAINING PROGRAM
Payer: MEDICARE

## 2024-07-26 DIAGNOSIS — N39.0 CHRONIC UTI (URINARY TRACT INFECTION): Primary | ICD-10-CM

## 2024-07-26 DIAGNOSIS — B96.1 KLEBSIELLA CYSTITIS: ICD-10-CM

## 2024-07-26 DIAGNOSIS — R73.9 HYPERGLYCEMIA: ICD-10-CM

## 2024-07-26 DIAGNOSIS — G62.9 NEUROPATHY: ICD-10-CM

## 2024-07-26 DIAGNOSIS — L01.00 IMPETIGO: ICD-10-CM

## 2024-07-26 DIAGNOSIS — N30.90 KLEBSIELLA CYSTITIS: ICD-10-CM

## 2024-07-26 DIAGNOSIS — E11.65 UNCONTROLLED TYPE 2 DIABETES MELLITUS WITH HYPERGLYCEMIA (HCC): ICD-10-CM

## 2024-07-26 PROBLEM — Z16.12 ESBL (EXTENDED SPECTRUM BETA-LACTAMASE) PRODUCING BACTERIA INFECTION: Status: ACTIVE | Noted: 2024-07-26

## 2024-07-26 PROBLEM — A49.9 ESBL (EXTENDED SPECTRUM BETA-LACTAMASE) PRODUCING BACTERIA INFECTION: Status: ACTIVE | Noted: 2024-07-26

## 2024-07-26 LAB
ALBUMIN SERPL-MCNC: 3.9 G/DL (ref 3.5–5)
ALBUMIN/GLOB SERPL: 1.4 (ref 1.1–2.2)
ALP SERPL-CCNC: 84 U/L (ref 45–117)
ALT SERPL-CCNC: 66 U/L (ref 12–78)
ANION GAP SERPL CALC-SCNC: 8 MMOL/L (ref 5–15)
APPEARANCE UR: CLEAR
AST SERPL-CCNC: 46 U/L (ref 15–37)
BACTERIA URNS QL MICRO: ABNORMAL /HPF
BASOPHILS # BLD: 0 K/UL (ref 0–0.1)
BASOPHILS NFR BLD: 0 % (ref 0–1)
BILIRUB SERPL-MCNC: 0.5 MG/DL (ref 0.2–1)
BILIRUB UR QL: NEGATIVE
BUN SERPL-MCNC: 36 MG/DL (ref 6–20)
BUN/CREAT SERPL: 32 (ref 12–20)
CALCIUM SERPL-MCNC: 9.9 MG/DL (ref 8.5–10.1)
CHLORIDE SERPL-SCNC: 100 MMOL/L (ref 97–108)
CO2 SERPL-SCNC: 29 MMOL/L (ref 21–32)
COLOR UR: ABNORMAL
CREAT SERPL-MCNC: 1.12 MG/DL (ref 0.55–1.02)
DIFFERENTIAL METHOD BLD: ABNORMAL
EOSINOPHIL # BLD: 0 K/UL (ref 0–0.4)
EOSINOPHIL NFR BLD: 0 % (ref 0–7)
EPITH CASTS URNS QL MICRO: ABNORMAL /LPF
ERYTHROCYTE [DISTWIDTH] IN BLOOD BY AUTOMATED COUNT: 14.4 % (ref 11.5–14.5)
GLOBULIN SER CALC-MCNC: 2.7 G/DL (ref 2–4)
GLUCOSE SERPL-MCNC: 395 MG/DL (ref 65–100)
GLUCOSE UR STRIP.AUTO-MCNC: >1000 MG/DL
HCT VFR BLD AUTO: 44.7 % (ref 35–47)
HGB BLD-MCNC: 15.1 G/DL (ref 11.5–16)
HGB UR QL STRIP: NEGATIVE
HYALINE CASTS URNS QL MICRO: ABNORMAL /LPF (ref 0–5)
IMM GRANULOCYTES # BLD AUTO: 0.1 K/UL (ref 0–0.04)
IMM GRANULOCYTES NFR BLD AUTO: 1 % (ref 0–0.5)
KETONES UR QL STRIP.AUTO: NEGATIVE MG/DL
LEUKOCYTE ESTERASE UR QL STRIP.AUTO: NEGATIVE
LYMPHOCYTES # BLD: 0.6 K/UL (ref 0.8–3.5)
LYMPHOCYTES NFR BLD: 7 % (ref 12–49)
MCH RBC QN AUTO: 34.1 PG (ref 26–34)
MCHC RBC AUTO-ENTMCNC: 33.8 G/DL (ref 30–36.5)
MCV RBC AUTO: 100.9 FL (ref 80–99)
MONOCYTES # BLD: 0.3 K/UL (ref 0–1)
MONOCYTES NFR BLD: 4 % (ref 5–13)
NEUTS SEG # BLD: 7.7 K/UL (ref 1.8–8)
NEUTS SEG NFR BLD: 88 % (ref 32–75)
NITRITE UR QL STRIP.AUTO: POSITIVE
NRBC # BLD: 0 K/UL (ref 0–0.01)
NRBC BLD-RTO: 0 PER 100 WBC
PH UR STRIP: 5.5 (ref 5–8)
PLATELET # BLD AUTO: 248 K/UL (ref 150–400)
PMV BLD AUTO: 10.4 FL (ref 8.9–12.9)
POTASSIUM SERPL-SCNC: 4.7 MMOL/L (ref 3.5–5.1)
PROT SERPL-MCNC: 6.6 G/DL (ref 6.4–8.2)
PROT UR STRIP-MCNC: NEGATIVE MG/DL
RBC # BLD AUTO: 4.43 M/UL (ref 3.8–5.2)
RBC #/AREA URNS HPF: ABNORMAL /HPF (ref 0–5)
RBC MORPH BLD: ABNORMAL
SODIUM SERPL-SCNC: 137 MMOL/L (ref 136–145)
SP GR UR REFRACTOMETRY: 1.02
URINE CULTURE IF INDICATED: ABNORMAL
UROBILINOGEN UR QL STRIP.AUTO: 0.2 EU/DL (ref 0.2–1)
WBC # BLD AUTO: 8.7 K/UL (ref 3.6–11)
WBC URNS QL MICRO: ABNORMAL /HPF (ref 0–4)

## 2024-07-26 PROCEDURE — 87088 URINE BACTERIA CULTURE: CPT

## 2024-07-26 PROCEDURE — 96365 THER/PROPH/DIAG IV INF INIT: CPT

## 2024-07-26 PROCEDURE — 87186 SC STD MICRODIL/AGAR DIL: CPT

## 2024-07-26 PROCEDURE — 80053 COMPREHEN METABOLIC PANEL: CPT

## 2024-07-26 PROCEDURE — 6370000000 HC RX 637 (ALT 250 FOR IP)

## 2024-07-26 PROCEDURE — 36415 COLL VENOUS BLD VENIPUNCTURE: CPT

## 2024-07-26 PROCEDURE — 6360000002 HC RX W HCPCS

## 2024-07-26 PROCEDURE — 1100000000 HC RM PRIVATE

## 2024-07-26 PROCEDURE — 2580000003 HC RX 258

## 2024-07-26 PROCEDURE — 96375 TX/PRO/DX INJ NEW DRUG ADDON: CPT

## 2024-07-26 PROCEDURE — 99285 EMERGENCY DEPT VISIT HI MDM: CPT

## 2024-07-26 PROCEDURE — 87086 URINE CULTURE/COLONY COUNT: CPT

## 2024-07-26 PROCEDURE — 81001 URINALYSIS AUTO W/SCOPE: CPT

## 2024-07-26 PROCEDURE — 85025 COMPLETE CBC W/AUTO DIFF WBC: CPT

## 2024-07-26 RX ORDER — HYDROCHLOROTHIAZIDE 25 MG/1
25 TABLET ORAL DAILY
Status: DISCONTINUED | OUTPATIENT
Start: 2024-07-27 | End: 2024-08-02 | Stop reason: HOSPADM

## 2024-07-26 RX ORDER — VITAMIN B COMPLEX
1000 TABLET ORAL DAILY
Status: DISCONTINUED | OUTPATIENT
Start: 2024-07-27 | End: 2024-08-02 | Stop reason: HOSPADM

## 2024-07-26 RX ORDER — ACETAMINOPHEN 650 MG/1
650 SUPPOSITORY RECTAL EVERY 6 HOURS PRN
Status: DISCONTINUED | OUTPATIENT
Start: 2024-07-26 | End: 2024-08-02 | Stop reason: HOSPADM

## 2024-07-26 RX ORDER — ENOXAPARIN SODIUM 100 MG/ML
40 INJECTION SUBCUTANEOUS DAILY
Status: DISCONTINUED | OUTPATIENT
Start: 2024-07-27 | End: 2024-08-02 | Stop reason: HOSPADM

## 2024-07-26 RX ORDER — DEXTROSE MONOHYDRATE 100 MG/ML
INJECTION, SOLUTION INTRAVENOUS CONTINUOUS PRN
Status: DISCONTINUED | OUTPATIENT
Start: 2024-07-26 | End: 2024-08-02 | Stop reason: HOSPADM

## 2024-07-26 RX ORDER — INSULIN GLARGINE 100 [IU]/ML
40 INJECTION, SOLUTION SUBCUTANEOUS EVERY MORNING
Status: DISCONTINUED | OUTPATIENT
Start: 2024-07-27 | End: 2024-07-28

## 2024-07-26 RX ORDER — LISINOPRIL 5 MG/1
10 TABLET ORAL DAILY
Status: DISCONTINUED | OUTPATIENT
Start: 2024-07-27 | End: 2024-08-02 | Stop reason: HOSPADM

## 2024-07-26 RX ORDER — GLUCAGON 1 MG/ML
1 KIT INJECTION PRN
Status: DISCONTINUED | OUTPATIENT
Start: 2024-07-26 | End: 2024-08-02 | Stop reason: HOSPADM

## 2024-07-26 RX ORDER — INSULIN LISPRO 100 [IU]/ML
0-4 INJECTION, SOLUTION INTRAVENOUS; SUBCUTANEOUS NIGHTLY
Status: DISCONTINUED | OUTPATIENT
Start: 2024-07-26 | End: 2024-08-02 | Stop reason: HOSPADM

## 2024-07-26 RX ORDER — SODIUM CHLORIDE 0.9 % (FLUSH) 0.9 %
5-40 SYRINGE (ML) INJECTION PRN
Status: DISCONTINUED | OUTPATIENT
Start: 2024-07-26 | End: 2024-08-02 | Stop reason: HOSPADM

## 2024-07-26 RX ORDER — SODIUM CHLORIDE 9 MG/ML
INJECTION, SOLUTION INTRAVENOUS PRN
Status: DISCONTINUED | OUTPATIENT
Start: 2024-07-26 | End: 2024-08-02 | Stop reason: HOSPADM

## 2024-07-26 RX ORDER — ONDANSETRON 2 MG/ML
4 INJECTION INTRAMUSCULAR; INTRAVENOUS EVERY 4 HOURS PRN
Status: DISCONTINUED | OUTPATIENT
Start: 2024-07-26 | End: 2024-08-02 | Stop reason: HOSPADM

## 2024-07-26 RX ORDER — INSULIN GLARGINE 100 [IU]/ML
10 INJECTION, SOLUTION SUBCUTANEOUS NIGHTLY
Status: DISCONTINUED | OUTPATIENT
Start: 2024-07-26 | End: 2024-07-29

## 2024-07-26 RX ORDER — GABAPENTIN 100 MG/1
100 CAPSULE ORAL 3 TIMES DAILY
Status: DISCONTINUED | OUTPATIENT
Start: 2024-07-26 | End: 2024-08-02 | Stop reason: HOSPADM

## 2024-07-26 RX ORDER — ROSUVASTATIN CALCIUM 20 MG/1
20 TABLET, COATED ORAL NIGHTLY
Status: DISCONTINUED | OUTPATIENT
Start: 2024-07-26 | End: 2024-08-02 | Stop reason: HOSPADM

## 2024-07-26 RX ORDER — INSULIN LISPRO 100 [IU]/ML
0-4 INJECTION, SOLUTION INTRAVENOUS; SUBCUTANEOUS
Status: DISCONTINUED | OUTPATIENT
Start: 2024-07-27 | End: 2024-08-02 | Stop reason: HOSPADM

## 2024-07-26 RX ORDER — ACETAMINOPHEN 325 MG/1
650 TABLET ORAL EVERY 6 HOURS PRN
Status: DISCONTINUED | OUTPATIENT
Start: 2024-07-26 | End: 2024-08-02 | Stop reason: HOSPADM

## 2024-07-26 RX ORDER — PRIMIDONE 50 MG/1
50 TABLET ORAL 2 TIMES DAILY
Status: DISCONTINUED | OUTPATIENT
Start: 2024-07-26 | End: 2024-08-02 | Stop reason: HOSPADM

## 2024-07-26 RX ORDER — SODIUM CHLORIDE 0.9 % (FLUSH) 0.9 %
5-40 SYRINGE (ML) INJECTION EVERY 12 HOURS SCHEDULED
Status: DISCONTINUED | OUTPATIENT
Start: 2024-07-26 | End: 2024-08-02 | Stop reason: HOSPADM

## 2024-07-26 RX ORDER — FUROSEMIDE 20 MG/1
20 TABLET ORAL 2 TIMES DAILY
Status: DISCONTINUED | OUTPATIENT
Start: 2024-07-26 | End: 2024-08-02 | Stop reason: HOSPADM

## 2024-07-26 RX ORDER — ACETAMINOPHEN 325 MG/1
650 TABLET ORAL EVERY 4 HOURS PRN
Status: DISCONTINUED | OUTPATIENT
Start: 2024-07-26 | End: 2024-07-29

## 2024-07-26 RX ORDER — MONTELUKAST SODIUM 10 MG/1
10 TABLET ORAL NIGHTLY
Status: DISCONTINUED | OUTPATIENT
Start: 2024-07-26 | End: 2024-08-02 | Stop reason: HOSPADM

## 2024-07-26 RX ORDER — PANTOPRAZOLE SODIUM 40 MG/1
40 TABLET, DELAYED RELEASE ORAL
Status: DISCONTINUED | OUTPATIENT
Start: 2024-07-27 | End: 2024-08-02 | Stop reason: HOSPADM

## 2024-07-26 RX ORDER — GABAPENTIN 100 MG/1
100 CAPSULE ORAL NIGHTLY
Status: DISCONTINUED | OUTPATIENT
Start: 2024-07-26 | End: 2024-08-02 | Stop reason: HOSPADM

## 2024-07-26 RX ADMIN — INSULIN HUMAN 10 UNITS: 100 INJECTION, SOLUTION PARENTERAL at 21:33

## 2024-07-26 RX ADMIN — MEROPENEM 1000 MG: 1 INJECTION, POWDER, FOR SOLUTION INTRAVENOUS at 21:38

## 2024-07-26 ASSESSMENT — PAIN DESCRIPTION - DESCRIPTORS: DESCRIPTORS: ACHING

## 2024-07-26 ASSESSMENT — PAIN SCALES - GENERAL: PAINLEVEL_OUTOF10: 3

## 2024-07-26 ASSESSMENT — PAIN DESCRIPTION - ORIENTATION: ORIENTATION: LOWER

## 2024-07-26 ASSESSMENT — PAIN - FUNCTIONAL ASSESSMENT: PAIN_FUNCTIONAL_ASSESSMENT: ACTIVITIES ARE NOT PREVENTED

## 2024-07-26 ASSESSMENT — PAIN DESCRIPTION - LOCATION: LOCATION: ABDOMEN

## 2024-07-26 NOTE — TELEPHONE ENCOUNTER
Please call Ms. Collins to follow-up from yesterday's visit.  - Ask how her appointment went with uro-gynecologist Dr. Todd Martins yesterday  - Specifically, was she put on alternate antibiotic for her UTI symptoms, and if so, which antibiotic is she on?  If she is not getting treatment for this and she is still having symptoms, then she will need to go to the ER for readmission and IV antibiotics, due to Klebsiella infection on culture.     Patient returned call.  Saw Dr. Todd Martins yesterday and got a complete exam and she said Dr. Martins said this was not something she could take care of.  Referred to Dr. Roach who works with Infectious Disease.  Has appointment 7-.  Dr Martins also recommended she see an allergist for these questionable antibiotic allergies.      Has not been treated for this UTI.  Advised to go to the ER for treatment.  Patient states she will make arrangements to go today.      It was also recommended she see a kidney doctor for further evaluation of her leg swelling.

## 2024-07-27 LAB
ALBUMIN SERPL-MCNC: 3.4 G/DL (ref 3.5–5)
ALBUMIN/GLOB SERPL: 1.1 (ref 1.1–2.2)
ALP SERPL-CCNC: 78 U/L (ref 45–117)
ALT SERPL-CCNC: 58 U/L (ref 12–78)
ANION GAP SERPL CALC-SCNC: 8 MMOL/L (ref 5–15)
AST SERPL-CCNC: 26 U/L (ref 15–37)
BASOPHILS # BLD: 0 K/UL (ref 0–0.1)
BASOPHILS NFR BLD: 0 % (ref 0–1)
BILIRUB SERPL-MCNC: 0.5 MG/DL (ref 0.2–1)
BUN SERPL-MCNC: 34 MG/DL (ref 6–20)
BUN/CREAT SERPL: 35 (ref 12–20)
CALCIUM SERPL-MCNC: 9.5 MG/DL (ref 8.5–10.1)
CHLORIDE SERPL-SCNC: 100 MMOL/L (ref 97–108)
CO2 SERPL-SCNC: 31 MMOL/L (ref 21–32)
CREAT SERPL-MCNC: 0.98 MG/DL (ref 0.55–1.02)
DIFFERENTIAL METHOD BLD: ABNORMAL
EOSINOPHIL # BLD: 0 K/UL (ref 0–0.4)
EOSINOPHIL NFR BLD: 0 % (ref 0–7)
ERYTHROCYTE [DISTWIDTH] IN BLOOD BY AUTOMATED COUNT: 14.1 % (ref 11.5–14.5)
GLOBULIN SER CALC-MCNC: 3 G/DL (ref 2–4)
GLUCOSE BLD STRIP.AUTO-MCNC: 226 MG/DL (ref 65–117)
GLUCOSE BLD STRIP.AUTO-MCNC: 297 MG/DL (ref 65–117)
GLUCOSE BLD STRIP.AUTO-MCNC: 326 MG/DL (ref 65–117)
GLUCOSE BLD STRIP.AUTO-MCNC: 453 MG/DL (ref 65–117)
GLUCOSE BLD STRIP.AUTO-MCNC: 477 MG/DL (ref 65–117)
GLUCOSE BLD STRIP.AUTO-MCNC: 487 MG/DL (ref 65–117)
GLUCOSE SERPL-MCNC: 282 MG/DL (ref 65–100)
HCT VFR BLD AUTO: 42.4 % (ref 35–47)
HGB BLD-MCNC: 14.7 G/DL (ref 11.5–16)
IMM GRANULOCYTES # BLD AUTO: 0.1 K/UL (ref 0–0.04)
IMM GRANULOCYTES NFR BLD AUTO: 1 % (ref 0–0.5)
LYMPHOCYTES # BLD: 1.7 K/UL (ref 0.8–3.5)
LYMPHOCYTES NFR BLD: 16 % (ref 12–49)
MCH RBC QN AUTO: 34.2 PG (ref 26–34)
MCHC RBC AUTO-ENTMCNC: 34.7 G/DL (ref 30–36.5)
MCV RBC AUTO: 98.6 FL (ref 80–99)
MONOCYTES # BLD: 0.6 K/UL (ref 0–1)
MONOCYTES NFR BLD: 6 % (ref 5–13)
NEUTS SEG # BLD: 8.1 K/UL (ref 1.8–8)
NEUTS SEG NFR BLD: 77 % (ref 32–75)
NRBC # BLD: 0 K/UL (ref 0–0.01)
NRBC BLD-RTO: 0 PER 100 WBC
PLATELET # BLD AUTO: 218 K/UL (ref 150–400)
PMV BLD AUTO: 10.3 FL (ref 8.9–12.9)
POTASSIUM SERPL-SCNC: 3.8 MMOL/L (ref 3.5–5.1)
PROCALCITONIN SERPL-MCNC: <0.05 NG/ML
PROT SERPL-MCNC: 6.4 G/DL (ref 6.4–8.2)
RBC # BLD AUTO: 4.3 M/UL (ref 3.8–5.2)
SERVICE CMNT-IMP: ABNORMAL
SODIUM SERPL-SCNC: 139 MMOL/L (ref 136–145)
WBC # BLD AUTO: 10.7 K/UL (ref 3.6–11)

## 2024-07-27 PROCEDURE — 6370000000 HC RX 637 (ALT 250 FOR IP): Performed by: INTERNAL MEDICINE

## 2024-07-27 PROCEDURE — 2580000003 HC RX 258: Performed by: INTERNAL MEDICINE

## 2024-07-27 PROCEDURE — 6370000000 HC RX 637 (ALT 250 FOR IP): Performed by: STUDENT IN AN ORGANIZED HEALTH CARE EDUCATION/TRAINING PROGRAM

## 2024-07-27 PROCEDURE — 6360000002 HC RX W HCPCS: Performed by: INTERNAL MEDICINE

## 2024-07-27 PROCEDURE — 2580000003 HC RX 258: Performed by: STUDENT IN AN ORGANIZED HEALTH CARE EDUCATION/TRAINING PROGRAM

## 2024-07-27 PROCEDURE — 1100000000 HC RM PRIVATE

## 2024-07-27 PROCEDURE — 6360000002 HC RX W HCPCS: Performed by: STUDENT IN AN ORGANIZED HEALTH CARE EDUCATION/TRAINING PROGRAM

## 2024-07-27 PROCEDURE — 87040 BLOOD CULTURE FOR BACTERIA: CPT

## 2024-07-27 PROCEDURE — 80053 COMPREHEN METABOLIC PANEL: CPT

## 2024-07-27 PROCEDURE — 36415 COLL VENOUS BLD VENIPUNCTURE: CPT

## 2024-07-27 PROCEDURE — 85025 COMPLETE CBC W/AUTO DIFF WBC: CPT

## 2024-07-27 PROCEDURE — 82962 GLUCOSE BLOOD TEST: CPT

## 2024-07-27 PROCEDURE — 84145 PROCALCITONIN (PCT): CPT

## 2024-07-27 RX ORDER — INSULIN LISPRO 100 [IU]/ML
4 INJECTION, SOLUTION INTRAVENOUS; SUBCUTANEOUS ONCE
Status: COMPLETED | OUTPATIENT
Start: 2024-07-27 | End: 2024-07-27

## 2024-07-27 RX ORDER — LIDOCAINE 4 G/G
1 PATCH TOPICAL DAILY
Status: DISCONTINUED | OUTPATIENT
Start: 2024-07-27 | End: 2024-08-02 | Stop reason: HOSPADM

## 2024-07-27 RX ORDER — INSULIN LISPRO 100 [IU]/ML
9 INJECTION, SOLUTION INTRAVENOUS; SUBCUTANEOUS ONCE
Status: COMPLETED | OUTPATIENT
Start: 2024-07-27 | End: 2024-07-27

## 2024-07-27 RX ADMIN — INSULIN GLARGINE 40 UNITS: 100 INJECTION, SOLUTION SUBCUTANEOUS at 09:50

## 2024-07-27 RX ADMIN — INSULIN LISPRO 4 UNITS: 100 INJECTION, SOLUTION INTRAVENOUS; SUBCUTANEOUS at 21:18

## 2024-07-27 RX ADMIN — HYDROCHLOROTHIAZIDE 25 MG: 25 TABLET ORAL at 09:49

## 2024-07-27 RX ADMIN — FUROSEMIDE 20 MG: 20 TABLET ORAL at 21:21

## 2024-07-27 RX ADMIN — ROSUVASTATIN CALCIUM 20 MG: 20 TABLET, FILM COATED ORAL at 21:21

## 2024-07-27 RX ADMIN — MONTELUKAST 10 MG: 10 TABLET, FILM COATED ORAL at 02:07

## 2024-07-27 RX ADMIN — Medication 1 UNITS: at 09:50

## 2024-07-27 RX ADMIN — MUPIROCIN: 20 OINTMENT TOPICAL at 15:27

## 2024-07-27 RX ADMIN — GABAPENTIN 100 MG: 100 CAPSULE ORAL at 02:08

## 2024-07-27 RX ADMIN — GABAPENTIN 100 MG: 100 CAPSULE ORAL at 02:09

## 2024-07-27 RX ADMIN — FUROSEMIDE 20 MG: 20 TABLET ORAL at 09:49

## 2024-07-27 RX ADMIN — FUROSEMIDE 20 MG: 20 TABLET ORAL at 02:08

## 2024-07-27 RX ADMIN — ROSUVASTATIN CALCIUM 20 MG: 20 TABLET, FILM COATED ORAL at 02:11

## 2024-07-27 RX ADMIN — INSULIN GLARGINE 10 UNITS: 100 INJECTION, SOLUTION SUBCUTANEOUS at 21:18

## 2024-07-27 RX ADMIN — SODIUM CHLORIDE, PRESERVATIVE FREE 10 ML: 5 INJECTION INTRAVENOUS at 02:12

## 2024-07-27 RX ADMIN — PRIMIDONE 50 MG: 50 TABLET ORAL at 09:49

## 2024-07-27 RX ADMIN — PREDNISONE 30 MG: 5 TABLET ORAL at 09:48

## 2024-07-27 RX ADMIN — GABAPENTIN 100 MG: 100 CAPSULE ORAL at 09:49

## 2024-07-27 RX ADMIN — LISINOPRIL 10 MG: 5 TABLET ORAL at 09:49

## 2024-07-27 RX ADMIN — GABAPENTIN 100 MG: 100 CAPSULE ORAL at 21:21

## 2024-07-27 RX ADMIN — MEROPENEM 1000 MG: 1 INJECTION INTRAVENOUS at 17:39

## 2024-07-27 RX ADMIN — MONTELUKAST 10 MG: 10 TABLET, FILM COATED ORAL at 21:21

## 2024-07-27 RX ADMIN — MUPIROCIN: 20 OINTMENT TOPICAL at 21:22

## 2024-07-27 RX ADMIN — INSULIN GLARGINE 10 UNITS: 100 INJECTION, SOLUTION SUBCUTANEOUS at 02:07

## 2024-07-27 RX ADMIN — INSULIN LISPRO 4 UNITS: 100 INJECTION, SOLUTION INTRAVENOUS; SUBCUTANEOUS at 21:25

## 2024-07-27 RX ADMIN — PANTOPRAZOLE SODIUM 40 MG: 40 TABLET, DELAYED RELEASE ORAL at 07:22

## 2024-07-27 RX ADMIN — SODIUM CHLORIDE, PRESERVATIVE FREE 10 ML: 5 INJECTION INTRAVENOUS at 09:50

## 2024-07-27 RX ADMIN — GABAPENTIN 100 MG: 100 CAPSULE ORAL at 15:25

## 2024-07-27 RX ADMIN — PRIMIDONE 50 MG: 50 TABLET ORAL at 02:11

## 2024-07-27 RX ADMIN — Medication 3 UNITS: at 12:34

## 2024-07-27 RX ADMIN — Medication 1000 UNITS: at 09:49

## 2024-07-27 RX ADMIN — PRIMIDONE 50 MG: 50 TABLET ORAL at 21:21

## 2024-07-27 RX ADMIN — SODIUM CHLORIDE, PRESERVATIVE FREE 10 ML: 5 INJECTION INTRAVENOUS at 21:22

## 2024-07-27 RX ADMIN — DICLOFENAC SODIUM 2 G: 10 GEL TOPICAL at 15:26

## 2024-07-27 RX ADMIN — INSULIN LISPRO 9 UNITS: 100 INJECTION, SOLUTION INTRAVENOUS; SUBCUTANEOUS at 17:34

## 2024-07-27 RX ADMIN — MEROPENEM 1000 MG: 1 INJECTION, POWDER, FOR SOLUTION INTRAVENOUS at 10:12

## 2024-07-27 ASSESSMENT — PAIN DESCRIPTION - DESCRIPTORS
DESCRIPTORS: ACHING
DESCRIPTORS: ACHING

## 2024-07-27 ASSESSMENT — PAIN SCALES - GENERAL
PAINLEVEL_OUTOF10: 3
PAINLEVEL_OUTOF10: 2
PAINLEVEL_OUTOF10: 4
PAINLEVEL_OUTOF10: 2
PAINLEVEL_OUTOF10: 4

## 2024-07-27 ASSESSMENT — PAIN DESCRIPTION - LOCATION
LOCATION: BACK;LEG
LOCATION: GENERALIZED
LOCATION: BACK;LEG

## 2024-07-27 ASSESSMENT — PAIN DESCRIPTION - ORIENTATION: ORIENTATION: RIGHT;LEFT

## 2024-07-27 ASSESSMENT — PAIN DESCRIPTION - PAIN TYPE
TYPE: CHRONIC PAIN
TYPE: CHRONIC PAIN

## 2024-07-27 NOTE — CARE COORDINATION
Jimi Verdin - Child - [phone]  Click here to complete Healthcare Decision Makers including selection of the Healthcare Decision Maker Relationship (ie \"Primary\").       Content/Action Overview:  Has ACP document(s) on file - reflects the patient's care preferences  Reviewed DNR/DNI and patient confirms current DNR status - completed forms on file (place new order if needed)        Length of Voluntary ACP Conversation in minutes:  <16 minutes (Non-Billable)    GRAY Mckeon,  [phone]

## 2024-07-27 NOTE — ED PROVIDER NOTES
[facility] EMERGENCY DEPT  EMERGENCY DEPARTMENT ENCOUNTER       Pt Name: Siomara Collins  MRN: [id number]  Birthdate 1945  Date of evaluation: 7/26/2024  Provider: Yoanna Jackson PA-C   PCP: Irvin Vang MD  Note Started: 8:48 PM EDT 7/26/24     CHIEF COMPLAINT       Chief Complaint   Patient presents with    Frequent/Recurrent UTI     Patient arrives via wheelchair c/o pain on urination, bloating, and frequency. Patient states has been here 6x for same thing, no antibiotics are working.         HISTORY OF PRESENT ILLNESS: 1 or more elements      History From: Patient  HPI Limitations: None     Siomara Collins is a 79 y.o. female who presents to the emergency department in wheelchair for evaluation of painful urination, abdominal bloating, and increased urinary frequency.  Patient reports being to the emergency department at least 6 times for the same thing, frequently admitted for this complaint.  Patient denies fever or chills, states that last time she was here she was on IV antibiotics that she felt like are working for her, however patient reports that her antibiotics were changed after she was discharged, she feels as though her infection is back.  Patient states that she saw her primary care provider 2 days ago, who recommended she come back to the emergency department for another admission.  Patient states that she has started to follow with urology, however the urologist patient saw believes that her case is more complicated, and is recommending patient be seen by infectious disease.  Patient additionally reports that she had her dose of Lasix increased from 20 mg daily to 40 mg daily approximately 2 days ago.     Nursing Notes were all reviewed and agreed with or any disagreements were addressed in the HPI.     REVIEW OF SYSTEMS      Review of Systems     Positives and Pertinent negatives as per HPI.    PAST HISTORY     Past Medical History:  Past Medical History:   Diagnosis Date    Abnormal LFTs

## 2024-07-27 NOTE — PROGRESS NOTES
End of Shift Note    Bedside shift change report given to Dima JHA (oncoming nurse) by Makenzie Florian RN (offgoing nurse).  Report included the following information SBAR, ED Summary, MAR, Recent Results, and Med Rec Status    Shift worked:  7p-7a     Shift summary and any significant changes:     Received patient from ER, Loading dose of meropenem not given at ER, [facility] pharmacy for retime. Pharmacy on duty advised not to give the loading since regular dose was already given at ER.subsequent doses retimed.  No concerns as of this time.  Plan of care ongoing.     Concerns for physician to address:  Patient is taking oral supplements, could you please check if allowed. thanks     Zone phone for oncoming shift:   [phone]       Activity:     Number times ambulated in hallways past shift: 0  Number of times OOB to chair past shift: 0    Cardiac:   Cardiac Monitoring: No           Access:  Current line(s): PIV     Genitourinary:   Urinary status: voiding and external catheter    Respiratory:      Chronic home O2 use?: NO  Incentive spirometer at bedside: NO       GI:     Current diet:  ADULT DIET; Regular; 4 carb choices (60 gm/meal)  Passing flatus: YES  Tolerating current diet: YES       Pain Management:   Patient states pain is manageable on current regimen: YES    Skin:     Interventions: turn team, float heels, increase time out of bed, and internal/external urinary devices    Patient Safety:  Fall Score:    Interventions: bed/chair alarm, assistive device (walker, cane. etc), gripper socks, pt to call before getting OOB, stay with me (per policy), and gait belt       Length of Stay:  Expected LOS: 3  Actual LOS: 1      Makenzie Florian RN

## 2024-07-27 NOTE — PLAN OF CARE
Problem: Discharge Planning  Goal: Discharge to home or other facility with appropriate resources  Outcome: Progressing  Flowsheets (Taken 7/27/2024 0152)  Discharge to home or other facility with appropriate resources: Identify barriers to discharge with patient and caregiver     Problem: Pain  Goal: Verbalizes/displays adequate comfort level or baseline comfort level  Outcome: Progressing     Problem: Skin/Tissue Integrity  Goal: Absence of new skin breakdown  Description: 1.  Monitor for areas of redness and/or skin breakdown  2.  Assess vascular access sites hourly  3.  Every 4-6 hours minimum:  Change oxygen saturation probe site  4.  Every 4-6 hours:  If on nasal continuous positive airway pressure, respiratory therapy assess nares and determine need for appliance change or resting period.  Outcome: Progressing     Problem: Safety - Adult  Goal: Free from fall injury  Outcome: Progressing     Problem: ABCDS Injury Assessment  Goal: Absence of physical injury  Outcome: Progressing     Problem: Neurosensory - Adult  Goal: Achieves stable or improved neurological status  Outcome: Progressing  Goal: Absence of seizures  Outcome: Progressing  Goal: Remains free of injury related to seizures activity  Outcome: Progressing     Problem: Cardiovascular - Adult  Goal: Maintains optimal cardiac output and hemodynamic stability  Outcome: Progressing     Problem: Skin/Tissue Integrity - Adult  Goal: Skin integrity remains intact  Outcome: Progressing  Flowsheets (Taken 7/27/2024 0218)  Skin Integrity Remains Intact: Monitor for areas of redness and/or skin breakdown     Problem: Musculoskeletal - Adult  Goal: Return mobility to safest level of function  Outcome: Progressing     Problem: Genitourinary - Adult  Goal: Absence of urinary retention  Outcome: Progressing

## 2024-07-27 NOTE — H&P
Hospitalist Admission Note    NAME:Siomara Collins   : 1945   MRN: [id number]     Date/Time: 2024 11:52 PM    Patient PCP: Irvin Vang MD    *Please be aware this note is formulated with assistance from voice-recognition dictation software. Please excuse any errors that may be present*    ______________________________________________________________________  Given the patient's current clinical presentation, I have a high level of concern for decompensation if discharged from the emergency department.  Complex decision making was performed, which includes reviewing the patient's available past medical records, laboratory results, and x-ray films.       My assessment of this patient's clinical condition and my plan of care is as follows.    Problem List:  Patient Active Problem List   Diagnosis    Controlled type 2 diabetes mellitus with stage 2 chronic kidney disease, with long-term current use of insulin ([facility])    Rosacea    Class 1 obesity due to excess calories without serious comorbidity with body mass index (BMI) of 33.0 to 33.9 in adult    Mixed hyperlipidemia    Alcohol screening    Polymyalgia rheumatica (HCC)    Anxiety    Sleep disturbance    Hypertension with renal disease    Non-seasonal allergic rhinitis    Primary osteoarthritis involving multiple joints    GI bleed    Spinal stenosis, lumbar    Cervical stenosis of spine    Gait instability    IBS (irritable bowel syndrome)    CKD (chronic kidney disease), stage II    Recurrent depression ([facility])    Chronic midline low back pain without sciatica    Vitamin D deficiency    Avascular necrosis of hip ([facility])    Abnormal LFTs    Lumbar disc disease    Overactive bladder    Primary osteoarthritis of right shoulder    Interstitial lung disease ([facility])    Physical debility    Spinal stenosis, cervical region    Tremor    Chronic hypoxemic respiratory failure (HCC)    Neuropathy    Hypoglycemia    UTI due to extended-spectrum beta lactamase  multiple statins  Takes pravachol     Propoxyphene Other (See Comments)     \"I see worms\"    Codeine Rash     Rash on thighs    Penicillins Rash    Sulfa Antibiotics Rash        Prior to Admission medications    Medication Sig Start Date End Date Taking? Authorizing Provider   mupirocin (BACTROBAN) 2 % ointment Apply topically 2 times daily for 14 days To sore on right arm 7/24/24 8/7/24  Heena Patino PA-C   furosemide (LASIX) 20 MG tablet Take 1 tablet by mouth 2 times daily for 5 days 7/24/24 7/29/24  Heena Patino PA-C   cefUROXime (CEFTIN) 250 MG tablet Take 1 tablet by mouth 2 times daily for 10 days 7/19/24 7/29/24  Denton Barnett APRN - NP   Semaglutide,0.25 or 0.5MG/DOS, (OZEMPIC, 0.25 OR 0.5 MG/DOSE,) 2 MG/3ML SOPN Take Ozempic and 0.25 mg once weekly for 4 weeks and then increase to 0.5 mg weekly 7/16/24   Irvin Vang MD   gabapentin (NEURONTIN) 100 MG capsule Take 1 tablet twice a day and 2 tablets at bedtime 7/8/24 8/14/24  Beto Schneider MD   lanolin-petrolatum (A+D) ointment Apply topically 2 times daily as needed for Dry Skin    ProviderJessy MD   furosemide (LASIX) 20 MG tablet Take 1 tablet by mouth daily 5/29/24   Irvin Vang MD   omeprazole (PRILOSEC) 20 MG delayed release capsule TAKE ONE CAPSULE BY MOUTH IN THE MORNING FOR GERD 5/23/24   Irvin Vang MD   primidone (MYSOLINE) 50 MG tablet TAKE ONE TABLET BY MOUTH 2 TIMES A DAY FOR TREMORS 5/23/24   Irvin Vang MD   insulin glargine (LANTUS) 100 UNIT/ML injection vial Inject 40 units in the morning and 10 units before dinner 5/22/24   Irvin Vang MD   rosuvastatin (CRESTOR) 20 MG tablet Take 1 tablet by mouth nightly 5/16/24   Irvin Vang MD   vitamin D3 (CHOLECALCIFEROL) 25 MCG (1000 UT) TABS tablet Take 1 tablet by mouth daily 5/16/24   Irvin Vang MD   loperamide (IMODIUM) 2 MG capsule Take 1 capsule by mouth 4 times daily as needed for Diarrhea    Provider, MD Jessy

## 2024-07-27 NOTE — PLAN OF CARE
Problem: Discharge Planning  Goal: Discharge to home or other facility with appropriate resources  7/27/2024 1339 by Adair Roldan RN  Outcome: Progressing  7/27/2024 0256 by Makenzie Florian RN  Outcome: Progressing  Flowsheets (Taken 7/27/2024 0152)  Discharge to home or other facility with appropriate resources: Identify barriers to discharge with patient and caregiver     Problem: Pain  Goal: Verbalizes/displays adequate comfort level or baseline comfort level  7/27/2024 1339 by Adair Roldan RN  Outcome: Progressing  7/27/2024 0256 by Makenzie Florian RN  Outcome: Progressing     Problem: Skin/Tissue Integrity  Goal: Absence of new skin breakdown  Description: 1.  Monitor for areas of redness and/or skin breakdown  2.  Assess vascular access sites hourly  3.  Every 4-6 hours minimum:  Change oxygen saturation probe site  4.  Every 4-6 hours:  If on nasal continuous positive airway pressure, respiratory therapy assess nares and determine need for appliance change or resting period.  7/27/2024 1339 by Adair Roldan RN  Outcome: Progressing  7/27/2024 0256 by Makenzie Florian RN  Outcome: Progressing     Problem: Safety - Adult  Goal: Free from fall injury  7/27/2024 1339 by Adair Roldan RN  Outcome: Progressing  7/27/2024 0256 by Makenzie Florian RN  Outcome: Progressing     Problem: ABCDS Injury Assessment  Goal: Absence of physical injury  7/27/2024 1339 by Adair Roldan RN  Outcome: Progressing  7/27/2024 0256 by Makenzie Florian RN  Outcome: Progressing     Problem: Neurosensory - Adult  Goal: Achieves stable or improved neurological status  7/27/2024 1339 by Adair Roldan RN  Outcome: Progressing  7/27/2024 0256 by Makenzie Florian RN  Outcome: Progressing  Goal: Absence of seizures  7/27/2024 1339 by Adair Roldan RN  Outcome: Progressing  7/27/2024 0256 by Makenzie Florian RN  Outcome: Progressing  Goal: Remains free of injury related to seizures activity  7/27/2024

## 2024-07-27 NOTE — PROGRESS NOTES
Hospitalist Progress Note    NAME:   Siomara Collins   : 1945   MRN: [id number]     Date/Time: 2024 12:44 PM  Patient PCP: Irvin Vang MD    Estimated discharge date:  Barriers:       Assessment / Plan:        Recurrent ESBL Klebsiella UTI  -Hospitalized  with Klebsiella ESBL UTI, received meropenem and was discharged with plan for 3 more days of meropenem/ertapenem at skilled nursing facility.  Patient states that this was given to her once she got to the facility  - urine culture continue to growing ESBL Klebsiella and patient was started on Ceftin by her PCP, noted to have a Levaquin allergy  -Continues to have significant dysuria and burning with urination which is bothersome to her  -States that she saw urogynecology recently who noted no indication for intervention and wanted her to see infectious disease  -Is immunosuppressed secondary to chronic steroid use  -Urine culture showed possible UTI  -Meropenem started empirically in the emergency department  -Will continue IV meropenem for now  -Urine ordered for culture, pending  -Blood culture pending  -Patient insisting on seeing ID physician, will place consult     Type 2 diabetes  -Continue home insulin regimen, Lantus 40 in the morning and 10 at night with sliding scale during the day     History of interstitial lung disease  -Continue home prednisone 30 mg daily     Lower extremity edema  -Patient reports now is on Lasix 20 mg 2 times per day, will continue     Polymyalgia rheumatica  Dyslipidemia  Hypertension  Tremor  Diabetic neuropathy  -cont' home steroids as above.  Currently blood pressure is stable.    -Continue statin, Coreg and also gabapentin  -Continue home primidone        Medical Decision Making:   I personally reviewed labs: CBC, CMP, UA  I personally reviewed imaging: N/A  I personally reviewed EKG: N/A  Toxic drug monitoring: Yes  Discussed case with: RN, patient     Code Status: DNR/DNI  DVT Prophylaxis:

## 2024-07-28 PROBLEM — Z22.358 CARRIER OF EXTENDED SPECTRUM BETA LACTAMASE (ESBL) PRODUCING BACTERIA: Status: ACTIVE | Noted: 2024-07-28

## 2024-07-28 PROBLEM — R30.0 DYSURIA: Status: ACTIVE | Noted: 2024-07-28

## 2024-07-28 PROBLEM — N18.2 CKD (CHRONIC KIDNEY DISEASE) STAGE 2, GFR 60-89 ML/MIN: Status: ACTIVE | Noted: 2024-07-28

## 2024-07-28 PROBLEM — Z88.1 HX OF ANTIBIOTIC ALLERGY: Status: ACTIVE | Noted: 2024-07-28

## 2024-07-28 LAB
ALBUMIN SERPL-MCNC: 3.1 G/DL (ref 3.5–5)
ALBUMIN/GLOB SERPL: 1.1 (ref 1.1–2.2)
ALP SERPL-CCNC: 70 U/L (ref 45–117)
ALT SERPL-CCNC: 48 U/L (ref 12–78)
ANION GAP SERPL CALC-SCNC: 7 MMOL/L (ref 5–15)
AST SERPL-CCNC: 18 U/L (ref 15–37)
BASOPHILS # BLD: 0 K/UL (ref 0–0.1)
BASOPHILS NFR BLD: 0 % (ref 0–1)
BILIRUB SERPL-MCNC: 0.5 MG/DL (ref 0.2–1)
BUN SERPL-MCNC: 32 MG/DL (ref 6–20)
BUN/CREAT SERPL: 38 (ref 12–20)
CALCIUM SERPL-MCNC: 9.1 MG/DL (ref 8.5–10.1)
CHLORIDE SERPL-SCNC: 100 MMOL/L (ref 97–108)
CO2 SERPL-SCNC: 31 MMOL/L (ref 21–32)
CREAT SERPL-MCNC: 0.84 MG/DL (ref 0.55–1.02)
DIFFERENTIAL METHOD BLD: ABNORMAL
EOSINOPHIL # BLD: 0 K/UL (ref 0–0.4)
EOSINOPHIL NFR BLD: 0 % (ref 0–7)
ERYTHROCYTE [DISTWIDTH] IN BLOOD BY AUTOMATED COUNT: 14.1 % (ref 11.5–14.5)
GLOBULIN SER CALC-MCNC: 2.9 G/DL (ref 2–4)
GLUCOSE BLD STRIP.AUTO-MCNC: 219 MG/DL (ref 65–117)
GLUCOSE BLD STRIP.AUTO-MCNC: 282 MG/DL (ref 65–117)
GLUCOSE BLD STRIP.AUTO-MCNC: 400 MG/DL (ref 65–117)
GLUCOSE BLD STRIP.AUTO-MCNC: 444 MG/DL (ref 65–117)
GLUCOSE BLD STRIP.AUTO-MCNC: 462 MG/DL (ref 65–117)
GLUCOSE BLD STRIP.AUTO-MCNC: 548 MG/DL (ref 65–117)
GLUCOSE SERPL-MCNC: 273 MG/DL (ref 65–100)
HCT VFR BLD AUTO: 39.2 % (ref 35–47)
HGB BLD-MCNC: 13.9 G/DL (ref 11.5–16)
IMM GRANULOCYTES # BLD AUTO: 0.1 K/UL (ref 0–0.04)
IMM GRANULOCYTES NFR BLD AUTO: 1 % (ref 0–0.5)
LYMPHOCYTES # BLD: 1.3 K/UL (ref 0.8–3.5)
LYMPHOCYTES NFR BLD: 14 % (ref 12–49)
MAGNESIUM SERPL-MCNC: 2 MG/DL (ref 1.6–2.4)
MCH RBC QN AUTO: 34.7 PG (ref 26–34)
MCHC RBC AUTO-ENTMCNC: 35.5 G/DL (ref 30–36.5)
MCV RBC AUTO: 97.8 FL (ref 80–99)
MONOCYTES # BLD: 0.6 K/UL (ref 0–1)
MONOCYTES NFR BLD: 6 % (ref 5–13)
NEUTS SEG # BLD: 7.3 K/UL (ref 1.8–8)
NEUTS SEG NFR BLD: 79 % (ref 32–75)
NRBC # BLD: 0 K/UL (ref 0–0.01)
NRBC BLD-RTO: 0 PER 100 WBC
PLATELET # BLD AUTO: 199 K/UL (ref 150–400)
PMV BLD AUTO: 10.2 FL (ref 8.9–12.9)
POTASSIUM SERPL-SCNC: 3.4 MMOL/L (ref 3.5–5.1)
PROT SERPL-MCNC: 6 G/DL (ref 6.4–8.2)
RBC # BLD AUTO: 4.01 M/UL (ref 3.8–5.2)
SERVICE CMNT-IMP: ABNORMAL
SODIUM SERPL-SCNC: 138 MMOL/L (ref 136–145)
WBC # BLD AUTO: 9.4 K/UL (ref 3.6–11)

## 2024-07-28 PROCEDURE — 6360000002 HC RX W HCPCS: Performed by: INTERNAL MEDICINE

## 2024-07-28 PROCEDURE — 6370000000 HC RX 637 (ALT 250 FOR IP): Performed by: STUDENT IN AN ORGANIZED HEALTH CARE EDUCATION/TRAINING PROGRAM

## 2024-07-28 PROCEDURE — 6370000000 HC RX 637 (ALT 250 FOR IP): Performed by: HOSPITALIST

## 2024-07-28 PROCEDURE — 2580000003 HC RX 258: Performed by: INTERNAL MEDICINE

## 2024-07-28 PROCEDURE — 2580000003 HC RX 258: Performed by: STUDENT IN AN ORGANIZED HEALTH CARE EDUCATION/TRAINING PROGRAM

## 2024-07-28 PROCEDURE — 6370000000 HC RX 637 (ALT 250 FOR IP): Performed by: INTERNAL MEDICINE

## 2024-07-28 PROCEDURE — 80053 COMPREHEN METABOLIC PANEL: CPT

## 2024-07-28 PROCEDURE — 99223 1ST HOSP IP/OBS HIGH 75: CPT | Performed by: INTERNAL MEDICINE

## 2024-07-28 PROCEDURE — 85025 COMPLETE CBC W/AUTO DIFF WBC: CPT

## 2024-07-28 PROCEDURE — 82962 GLUCOSE BLOOD TEST: CPT

## 2024-07-28 PROCEDURE — 83735 ASSAY OF MAGNESIUM: CPT

## 2024-07-28 PROCEDURE — 36415 COLL VENOUS BLD VENIPUNCTURE: CPT

## 2024-07-28 PROCEDURE — 1100000000 HC RM PRIVATE

## 2024-07-28 RX ORDER — INSULIN GLARGINE 100 [IU]/ML
50 INJECTION, SOLUTION SUBCUTANEOUS EVERY MORNING
Status: DISCONTINUED | OUTPATIENT
Start: 2024-07-29 | End: 2024-07-31

## 2024-07-28 RX ORDER — INSULIN LISPRO 100 [IU]/ML
10 INJECTION, SOLUTION INTRAVENOUS; SUBCUTANEOUS ONCE
Status: COMPLETED | OUTPATIENT
Start: 2024-07-28 | End: 2024-07-28

## 2024-07-28 RX ORDER — INSULIN LISPRO 100 [IU]/ML
15 INJECTION, SOLUTION INTRAVENOUS; SUBCUTANEOUS ONCE
Status: COMPLETED | OUTPATIENT
Start: 2024-07-28 | End: 2024-07-28

## 2024-07-28 RX ADMIN — PRIMIDONE 50 MG: 50 TABLET ORAL at 10:16

## 2024-07-28 RX ADMIN — INSULIN LISPRO 15 UNITS: 100 INJECTION, SOLUTION INTRAVENOUS; SUBCUTANEOUS at 22:46

## 2024-07-28 RX ADMIN — PREDNISONE 30 MG: 5 TABLET ORAL at 10:16

## 2024-07-28 RX ADMIN — Medication 2 UNITS: at 12:49

## 2024-07-28 RX ADMIN — Medication 1000 UNITS: at 10:16

## 2024-07-28 RX ADMIN — INSULIN HUMAN 10 UNITS: 100 INJECTION, SUSPENSION SUBCUTANEOUS at 10:14

## 2024-07-28 RX ADMIN — LISINOPRIL 10 MG: 5 TABLET ORAL at 10:17

## 2024-07-28 RX ADMIN — MEROPENEM 1000 MG: 1 INJECTION INTRAVENOUS at 02:03

## 2024-07-28 RX ADMIN — INSULIN LISPRO 15 UNITS: 100 INJECTION, SOLUTION INTRAVENOUS; SUBCUTANEOUS at 20:27

## 2024-07-28 RX ADMIN — INSULIN GLARGINE 10 UNITS: 100 INJECTION, SOLUTION SUBCUTANEOUS at 20:28

## 2024-07-28 RX ADMIN — MUPIROCIN: 20 OINTMENT TOPICAL at 20:26

## 2024-07-28 RX ADMIN — MUPIROCIN: 20 OINTMENT TOPICAL at 10:18

## 2024-07-28 RX ADMIN — DICLOFENAC SODIUM 2 G: 10 GEL TOPICAL at 20:25

## 2024-07-28 RX ADMIN — MEROPENEM 1000 MG: 1 INJECTION INTRAVENOUS at 17:40

## 2024-07-28 RX ADMIN — MEROPENEM 1000 MG: 1 INJECTION INTRAVENOUS at 10:30

## 2024-07-28 RX ADMIN — FUROSEMIDE 20 MG: 20 TABLET ORAL at 10:16

## 2024-07-28 RX ADMIN — GABAPENTIN 100 MG: 100 CAPSULE ORAL at 15:50

## 2024-07-28 RX ADMIN — SODIUM CHLORIDE, PRESERVATIVE FREE 10 ML: 5 INJECTION INTRAVENOUS at 10:17

## 2024-07-28 RX ADMIN — GABAPENTIN 100 MG: 100 CAPSULE ORAL at 21:12

## 2024-07-28 RX ADMIN — HYDROCHLOROTHIAZIDE 25 MG: 25 TABLET ORAL at 10:16

## 2024-07-28 RX ADMIN — MONTELUKAST 10 MG: 10 TABLET, FILM COATED ORAL at 20:23

## 2024-07-28 RX ADMIN — GABAPENTIN 100 MG: 100 CAPSULE ORAL at 10:17

## 2024-07-28 RX ADMIN — Medication 1 UNITS: at 10:14

## 2024-07-28 RX ADMIN — PANTOPRAZOLE SODIUM 40 MG: 40 TABLET, DELAYED RELEASE ORAL at 06:26

## 2024-07-28 RX ADMIN — Medication 10 UNITS: at 17:31

## 2024-07-28 RX ADMIN — ROSUVASTATIN CALCIUM 20 MG: 20 TABLET, FILM COATED ORAL at 20:23

## 2024-07-28 RX ADMIN — SODIUM CHLORIDE, PRESERVATIVE FREE 10 ML: 5 INJECTION INTRAVENOUS at 20:26

## 2024-07-28 RX ADMIN — FUROSEMIDE 20 MG: 20 TABLET ORAL at 20:26

## 2024-07-28 RX ADMIN — PRIMIDONE 50 MG: 50 TABLET ORAL at 20:23

## 2024-07-28 RX ADMIN — GABAPENTIN 100 MG: 100 CAPSULE ORAL at 21:14

## 2024-07-28 ASSESSMENT — PAIN DESCRIPTION - LOCATION: LOCATION: LEG

## 2024-07-28 ASSESSMENT — PAIN DESCRIPTION - ORIENTATION: ORIENTATION: RIGHT;LEFT

## 2024-07-28 ASSESSMENT — PAIN SCALES - GENERAL: PAINLEVEL_OUTOF10: 6

## 2024-07-28 NOTE — PLAN OF CARE
Problem: Discharge Planning  Goal: Discharge to home or other facility with appropriate resources  7/27/2024 2217 by Makenzie Florian RN  Outcome: Progressing  7/27/2024 1339 by Adair Roldan RN  Outcome: Progressing     Problem: Pain  Goal: Verbalizes/displays adequate comfort level or baseline comfort level  7/27/2024 2217 by Makenzie Florian RN  Outcome: Progressing  7/27/2024 1339 by Adair Roldan RN  Outcome: Progressing     Problem: Skin/Tissue Integrity  Goal: Absence of new skin breakdown  Description: 1.  Monitor for areas of redness and/or skin breakdown  2.  Assess vascular access sites hourly  3.  Every 4-6 hours minimum:  Change oxygen saturation probe site  4.  Every 4-6 hours:  If on nasal continuous positive airway pressure, respiratory therapy assess nares and determine need for appliance change or resting period.  7/27/2024 2217 by Makenzie Florian RN  Outcome: Progressing  7/27/2024 1339 by Adair Roldan RN  Outcome: Progressing     Problem: Safety - Adult  Goal: Free from fall injury  7/27/2024 2217 by Makenzie Florian RN  Outcome: Progressing  7/27/2024 1339 by Adair Roldan RN  Outcome: Progressing     Problem: ABCDS Injury Assessment  Goal: Absence of physical injury  7/27/2024 2217 by Makenzie Florian RN  Outcome: Progressing  7/27/2024 1339 by Adair Roldan RN  Outcome: Progressing     Problem: Neurosensory - Adult  Goal: Achieves stable or improved neurological status  7/27/2024 2217 by Makenzie Florian RN  Outcome: Progressing  7/27/2024 1339 by Adair Roldan RN  Outcome: Progressing  Goal: Absence of seizures  7/27/2024 2217 by Makenzie Florian RN  Outcome: Progressing  7/27/2024 1339 by Adair Roldan RN  Outcome: Progressing  Goal: Remains free of injury related to seizures activity  7/27/2024 2217 by Makenzie Florian RN  Outcome: Progressing  7/27/2024 1339 by Adair Roldan RN  Outcome: Progressing     Problem: Cardiovascular - Adult  Goal:  Maintains optimal cardiac output and hemodynamic stability  7/27/2024 2217 by Makenzie Florian RN  Outcome: Progressing  7/27/2024 1339 by Adair Roldan RN  Outcome: Progressing     Problem: Skin/Tissue Integrity - Adult  Goal: Skin integrity remains intact  7/27/2024 2217 by Makenzie Florian RN  Outcome: Progressing  Flowsheets (Taken 7/27/2024 1939)  Skin Integrity Remains Intact: Monitor for areas of redness and/or skin breakdown  7/27/2024 1339 by Adair Roldan RN  Outcome: Progressing     Problem: Musculoskeletal - Adult  Goal: Return mobility to safest level of function  7/27/2024 2217 by Makenzie Florian RN  Outcome: Progressing  7/27/2024 1339 by Adair Roladn RN  Outcome: Progressing     Problem: Genitourinary - Adult  Goal: Absence of urinary retention  7/27/2024 2217 by Makenzie Florian RN  Outcome: Progressing  7/27/2024 1339 by Adair Roldan RN  Outcome: Progressing

## 2024-07-28 NOTE — CONSULTS
ENDOSCOPY      UROLOGICAL SURGERY  2000s    BLADDER SLING      Prior to Admission medications    Medication Sig Start Date End Date Taking? Authorizing Provider   mupirocin (BACTROBAN) 2 % ointment Apply topically 2 times daily for 14 days To sore on right arm 7/24/24 8/7/24  Heena Patino PA-C   furosemide (LASIX) 20 MG tablet Take 1 tablet by mouth 2 times daily for 5 days 7/24/24 7/29/24  Heena Patino PA-C   cefUROXime (CEFTIN) 250 MG tablet Take 1 tablet by mouth 2 times daily for 10 days 7/19/24 7/29/24  Denotn Barnett, APRN - NP   Semaglutide,0.25 or 0.5MG/DOS, (OZEMPIC, 0.25 OR 0.5 MG/DOSE,) 2 MG/3ML SOPN Take Ozempic and 0.25 mg once weekly for 4 weeks and then increase to 0.5 mg weekly 7/16/24   Irvin Vang MD   gabapentin (NEURONTIN) 100 MG capsule Take 1 tablet twice a day and 2 tablets at bedtime 7/8/24 8/14/24  Beto Schneider MD   lanolin-petrolatum (A+D) ointment Apply topically 2 times daily as needed for Dry Skin    ProviderJessy MD   furosemide (LASIX) 20 MG tablet Take 1 tablet by mouth daily  Patient taking differently: Take 1 tablet by mouth 2 times daily 5/29/24   Irvin Vang MD   omeprazole (PRILOSEC) 20 MG delayed release capsule TAKE ONE CAPSULE BY MOUTH IN THE MORNING FOR GERD 5/23/24   Irvin Vang MD   primidone (MYSOLINE) 50 MG tablet TAKE ONE TABLET BY MOUTH 2 TIMES A DAY FOR TREMORS 5/23/24   Irvin Vang MD   insulin glargine (LANTUS) 100 UNIT/ML injection vial Inject 40 units in the morning and 10 units before dinner 5/22/24   Irvin Vang MD   rosuvastatin (CRESTOR) 20 MG tablet Take 1 tablet by mouth nightly 5/16/24   Irvin Vang MD   vitamin D3 (CHOLECALCIFEROL) 25 MCG (1000 UT) TABS tablet Take 1 tablet by mouth daily 5/16/24   Irvin Vang MD   loperamide (IMODIUM) 2 MG capsule Take 1 capsule by mouth 4 times daily as needed for Diarrhea    Provider, MD Jessy   diclofenac sodium (VOLTAREN) 1 % GEL Apply 2 g  topically in the morning, at noon, in the evening, and at bedtime 24   Irvin Vang MD   lidocaine (HM LIDOCAINE PATCH) 4 % external patch Place 1 patch onto the skin daily 24   Irvin Vang MD   predniSONE (DELTASONE) 10 MG tablet Take 3 tablets daily 24   Irvin Vang MD   acetaminophen (TYLENOL) 325 MG tablet Take 2 tablets by mouth every 4 hours as needed for Pain    Jessy Grullon MD   polyethylene glycol (GLYCOLAX) 17 g packet Take 1 packet by mouth daily as needed for Constipation  Patient not taking: Reported on 2024    Jessy Grullon MD   insulin lispro (HUMALOG) 100 UNIT/ML SOLN injection vial Inject into the skin 3 times daily (before meals) Sliding scale insulin  151-200= 2 units  201-250=4 units  251-300=6 units  301-350= 8 units  351-400=10 units  >400 units=12 units    Jessy Grullon MD   lisinopril (PRINIVIL;ZESTRIL) 10 MG tablet Take 1 tablet by mouth daily 24   Jessy Grullon MD   hydroCHLOROthiazide (HYDRODIURIL) 25 MG tablet Take 1 tablet by mouth daily    Automatic Reconciliation, Ar   montelukast (SINGULAIR) 10 MG tablet Take 1 tablet by mouth nightly    Automatic Reconciliation, Ar     Allergies   Allergen Reactions    Levaquin [Levofloxacin] Itching, Dermatitis and Rash    Metformin Diarrhea    Statins Myalgia     Myalgia with  multiple statins  Takes pravachol     Propoxyphene Other (See Comments)     \"I see worms\"    Codeine Rash     Rash on thighs    Penicillins Rash    Sulfa Antibiotics Rash        Review of Systems:  Pertinent items are noted in the History of Present Illness.    Objective:   Blood pressure 124/70, pulse 67, temperature 98.1 °F (36.7 °C), temperature source Temporal, resp. rate 16, height 1.6 m (5' 3\"), weight 91.2 kg (201 lb), SpO2 94 %.  Temp (24hrs), Av.8 °F (36.6 °C), Min:97.5 °F (36.4 °C), Max:98.1 °F (36.7 °C)       Exam:    General:  Alert, cooperative, well noursished, well developed,

## 2024-07-28 NOTE — PLAN OF CARE
Problem: Discharge Planning  Goal: Discharge to home or other facility with appropriate resources  Outcome: Progressing     Problem: Pain  Goal: Verbalizes/displays adequate comfort level or baseline comfort level  Outcome: Progressing     Problem: Skin/Tissue Integrity  Goal: Absence of new skin breakdown  Description: 1.  Monitor for areas of redness and/or skin breakdown  2.  Assess vascular access sites hourly  3.  Every 4-6 hours minimum:  Change oxygen saturation probe site  4.  Every 4-6 hours:  If on nasal continuous positive airway pressure, respiratory therapy assess nares and determine need for appliance change or resting period.  Outcome: Progressing     Problem: Safety - Adult  Goal: Free from fall injury  Outcome: Progressing     Problem: ABCDS Injury Assessment  Goal: Absence of physical injury  Outcome: Progressing     Problem: Neurosensory - Adult  Goal: Achieves stable or improved neurological status  Outcome: Progressing  Goal: Absence of seizures  Outcome: Progressing  Goal: Remains free of injury related to seizures activity  Outcome: Progressing     Problem: Cardiovascular - Adult  Goal: Maintains optimal cardiac output and hemodynamic stability  Outcome: Progressing     Problem: Skin/Tissue Integrity - Adult  Goal: Skin integrity remains intact  Outcome: Progressing     Problem: Musculoskeletal - Adult  Goal: Return mobility to safest level of function  Outcome: Progressing     Problem: Genitourinary - Adult  Goal: Absence of urinary retention  Outcome: Progressing      Patient is scheduled for blood work on 08.22.2020 and to see Dr. Nguyen on 08.31.2020. Make sure to put lab orders in for . Any questions call patient to discuss.

## 2024-07-28 NOTE — PROGRESS NOTES
End of Shift Note    Bedside shift change report given to Judie JHA (oncoming nurse) by Makenzie Florian RN (offgoing nurse).  Report included the following information SBAR, ED Summary, MAR, Recent Results, and Med Rec Status    Shift worked:  7p-7a     Shift summary and any significant changes:    Patient slept well, no complaints of pain. Blood glucose at 2017 hrs 477, covering attending ordered to give lispro total of 8 units.No other concerns at this time.  Plan of care ongoing.     Concerns for physician to address:       Zone phone for oncoming shift:   [phone]       Activity:     Number times ambulated in hallways past shift: 0  Number of times OOB to chair past shift: 0    Cardiac:   Cardiac Monitoring: No           Access:  Current line(s): PIV     Genitourinary:   Urinary status: voiding and external catheter    Respiratory:      Chronic home O2 use?: NO  Incentive spirometer at bedside: NO       GI:     Current diet:  ADULT DIET; Regular; 4 carb choices (60 gm/meal)  Passing flatus: YES  Tolerating current diet: YES       Pain Management:   Patient states pain is manageable on current regimen: YES    Skin:     Interventions: turn team, float heels, increase time out of bed, and internal/external urinary devices    Patient Safety:  Fall Score:    Interventions: bed/chair alarm, assistive device (walker, cane. etc), gripper socks, pt to call before getting OOB, stay with me (per policy), and gait belt       Length of Stay:  Expected LOS: 3  Actual LOS: 2      Makenzie Florian RN

## 2024-07-28 NOTE — PROGRESS NOTES
Hospitalist Progress Note    NAME:   Siomara Collins   : 1945   MRN: [id number]     Date/Time: 2024 9:09 AM  Patient PCP: Irvin Vang MD    Estimated discharge date:   Barriers: Recommendation,      Assessment / Plan:        Recurrent ESBL Klebsiella UTI  -Hospitalized  with Klebsiella ESBL UTI, received meropenem and was discharged with plan for 3 more days of meropenem/ertapenem at skilled nursing facility.  Patient states that this was given to her once she got to the facility  - urine culture continue to growing ESBL Klebsiella and patient was started on Ceftin by her PCP, noted to have a Levaquin allergy  -Continues to have significant dysuria and burning with urination which is bothersome to her  -States that she saw urogynecology recently who noted no indication for intervention and wanted her to see infectious disease  -Is immunosuppressed secondary to chronic steroid use  -Urine culture showed possible UTI  -Meropenem started empirically in the emergency department  -Will continue IV meropenem for now  -Urine culture growing rods, identification pending  -Blood culture pending  -ID evaluation pending     Type 2 diabetes  -Glucose uncontrolled, increase Lantus from tomorrow, give NPH 10 units once     History of interstitial lung disease  -Continue home prednisone 30 mg daily     Lower extremity edema  -Patient reports now is on Lasix 20 mg 2 times per day, will continue     Polymyalgia rheumatica  Dyslipidemia  Hypertension  Tremor  Diabetic neuropathy  -cont' home steroids as above.  Currently blood pressure is stable.    -Continue statin, Coreg and also gabapentin  -Continue home primidone        Medical Decision Making:   I personally reviewed labs: CBC, CMP, UA  I personally reviewed imaging: N/A  I personally reviewed EKG: N/A  Toxic drug monitoring: Yes  Discussed case with: RN, patient     Code Status: DNR/DNI  DVT Prophylaxis: Lovenox    Subjective:     Chief

## 2024-07-29 LAB
ALBUMIN SERPL-MCNC: 3.2 G/DL (ref 3.5–5)
ALBUMIN/GLOB SERPL: 1 (ref 1.1–2.2)
ALP SERPL-CCNC: 70 U/L (ref 45–117)
ALT SERPL-CCNC: 47 U/L (ref 12–78)
ANION GAP SERPL CALC-SCNC: 5 MMOL/L (ref 5–15)
AST SERPL-CCNC: 26 U/L (ref 15–37)
BACTERIA SPEC CULT: ABNORMAL
BASOPHILS # BLD: 0.1 K/UL (ref 0–0.1)
BASOPHILS NFR BLD: 1 % (ref 0–1)
BILIRUB SERPL-MCNC: 0.6 MG/DL (ref 0.2–1)
BUN SERPL-MCNC: 34 MG/DL (ref 6–20)
BUN/CREAT SERPL: 43 (ref 12–20)
CALCIUM SERPL-MCNC: 9.8 MG/DL (ref 8.5–10.1)
CC UR VC: ABNORMAL
CHLORIDE SERPL-SCNC: 104 MMOL/L (ref 97–108)
CO2 SERPL-SCNC: 32 MMOL/L (ref 21–32)
CREAT SERPL-MCNC: 0.8 MG/DL (ref 0.55–1.02)
DIFFERENTIAL METHOD BLD: ABNORMAL
EOSINOPHIL # BLD: 0.1 K/UL (ref 0–0.4)
EOSINOPHIL NFR BLD: 1 % (ref 0–7)
ERYTHROCYTE [DISTWIDTH] IN BLOOD BY AUTOMATED COUNT: 14.2 % (ref 11.5–14.5)
GLOBULIN SER CALC-MCNC: 3.1 G/DL (ref 2–4)
GLUCOSE BLD STRIP.AUTO-MCNC: 109 MG/DL (ref 65–117)
GLUCOSE BLD STRIP.AUTO-MCNC: 265 MG/DL (ref 65–117)
GLUCOSE BLD STRIP.AUTO-MCNC: 417 MG/DL (ref 65–117)
GLUCOSE BLD STRIP.AUTO-MCNC: 445 MG/DL (ref 65–117)
GLUCOSE BLD STRIP.AUTO-MCNC: 501 MG/DL (ref 65–117)
GLUCOSE BLD STRIP.AUTO-MCNC: 576 MG/DL (ref 65–117)
GLUCOSE BLD STRIP.AUTO-MCNC: 98 MG/DL (ref 65–117)
GLUCOSE SERPL-MCNC: 109 MG/DL (ref 65–100)
HCT VFR BLD AUTO: 43.2 % (ref 35–47)
HGB BLD-MCNC: 14.8 G/DL (ref 11.5–16)
IMM GRANULOCYTES # BLD AUTO: 0.1 K/UL (ref 0–0.04)
IMM GRANULOCYTES NFR BLD AUTO: 1 % (ref 0–0.5)
LYMPHOCYTES # BLD: 1.8 K/UL (ref 0.8–3.5)
LYMPHOCYTES NFR BLD: 19 % (ref 12–49)
MCH RBC QN AUTO: 34.7 PG (ref 26–34)
MCHC RBC AUTO-ENTMCNC: 34.3 G/DL (ref 30–36.5)
MCV RBC AUTO: 101.2 FL (ref 80–99)
MONOCYTES # BLD: 0.9 K/UL (ref 0–1)
MONOCYTES NFR BLD: 9 % (ref 5–13)
NEUTS SEG # BLD: 6.7 K/UL (ref 1.8–8)
NEUTS SEG NFR BLD: 69 % (ref 32–75)
NRBC # BLD: 0 K/UL (ref 0–0.01)
NRBC BLD-RTO: 0 PER 100 WBC
PLATELET # BLD AUTO: 213 K/UL (ref 150–400)
PMV BLD AUTO: 10.6 FL (ref 8.9–12.9)
POTASSIUM SERPL-SCNC: 3.7 MMOL/L (ref 3.5–5.1)
PROT SERPL-MCNC: 6.3 G/DL (ref 6.4–8.2)
RBC # BLD AUTO: 4.27 M/UL (ref 3.8–5.2)
SERVICE CMNT-IMP: ABNORMAL
SERVICE CMNT-IMP: NORMAL
SERVICE CMNT-IMP: NORMAL
SODIUM SERPL-SCNC: 141 MMOL/L (ref 136–145)
WBC # BLD AUTO: 9.5 K/UL (ref 3.6–11)

## 2024-07-29 PROCEDURE — 82962 GLUCOSE BLOOD TEST: CPT

## 2024-07-29 PROCEDURE — 85025 COMPLETE CBC W/AUTO DIFF WBC: CPT

## 2024-07-29 PROCEDURE — 6370000000 HC RX 637 (ALT 250 FOR IP): Performed by: NURSE PRACTITIONER

## 2024-07-29 PROCEDURE — 2580000003 HC RX 258: Performed by: STUDENT IN AN ORGANIZED HEALTH CARE EDUCATION/TRAINING PROGRAM

## 2024-07-29 PROCEDURE — 6370000000 HC RX 637 (ALT 250 FOR IP): Performed by: STUDENT IN AN ORGANIZED HEALTH CARE EDUCATION/TRAINING PROGRAM

## 2024-07-29 PROCEDURE — 1100000000 HC RM PRIVATE

## 2024-07-29 PROCEDURE — 2580000003 HC RX 258: Performed by: INTERNAL MEDICINE

## 2024-07-29 PROCEDURE — 6360000002 HC RX W HCPCS: Performed by: INTERNAL MEDICINE

## 2024-07-29 PROCEDURE — 6370000000 HC RX 637 (ALT 250 FOR IP): Performed by: INTERNAL MEDICINE

## 2024-07-29 PROCEDURE — 36415 COLL VENOUS BLD VENIPUNCTURE: CPT

## 2024-07-29 PROCEDURE — 80053 COMPREHEN METABOLIC PANEL: CPT

## 2024-07-29 RX ORDER — INSULIN GLARGINE 100 [IU]/ML
15 INJECTION, SOLUTION SUBCUTANEOUS NIGHTLY
Status: DISCONTINUED | OUTPATIENT
Start: 2024-07-29 | End: 2024-07-31

## 2024-07-29 RX ORDER — INSULIN LISPRO 100 [IU]/ML
4 INJECTION, SOLUTION INTRAVENOUS; SUBCUTANEOUS ONCE
Status: COMPLETED | OUTPATIENT
Start: 2024-07-30 | End: 2024-07-30

## 2024-07-29 RX ORDER — VIBEGRON 75 MG/1
75 TABLET, FILM COATED ORAL DAILY
Status: ON HOLD | COMMUNITY
End: 2024-08-02

## 2024-07-29 RX ORDER — INSULIN LISPRO 100 [IU]/ML
12 INJECTION, SOLUTION INTRAVENOUS; SUBCUTANEOUS ONCE
Status: COMPLETED | OUTPATIENT
Start: 2024-07-29 | End: 2024-07-29

## 2024-07-29 RX ADMIN — SODIUM CHLORIDE, PRESERVATIVE FREE 10 ML: 5 INJECTION INTRAVENOUS at 09:57

## 2024-07-29 RX ADMIN — INSULIN GLARGINE 50 UNITS: 100 INJECTION, SOLUTION SUBCUTANEOUS at 10:05

## 2024-07-29 RX ADMIN — FUROSEMIDE 20 MG: 20 TABLET ORAL at 09:54

## 2024-07-29 RX ADMIN — PANTOPRAZOLE SODIUM 40 MG: 40 TABLET, DELAYED RELEASE ORAL at 06:06

## 2024-07-29 RX ADMIN — HYDROCHLOROTHIAZIDE 25 MG: 25 TABLET ORAL at 09:54

## 2024-07-29 RX ADMIN — ROSUVASTATIN CALCIUM 20 MG: 20 TABLET, FILM COATED ORAL at 20:58

## 2024-07-29 RX ADMIN — PRIMIDONE 50 MG: 50 TABLET ORAL at 09:55

## 2024-07-29 RX ADMIN — INSULIN GLARGINE 15 UNITS: 100 INJECTION, SOLUTION SUBCUTANEOUS at 20:59

## 2024-07-29 RX ADMIN — MUPIROCIN: 20 OINTMENT TOPICAL at 10:07

## 2024-07-29 RX ADMIN — PREDNISONE 30 MG: 5 TABLET ORAL at 09:52

## 2024-07-29 RX ADMIN — Medication 2 UNITS: at 13:15

## 2024-07-29 RX ADMIN — Medication 1000 UNITS: at 09:51

## 2024-07-29 RX ADMIN — INSULIN HUMAN 10 UNITS: 100 INJECTION, SOLUTION PARENTERAL at 21:04

## 2024-07-29 RX ADMIN — PRIMIDONE 50 MG: 50 TABLET ORAL at 20:58

## 2024-07-29 RX ADMIN — LISINOPRIL 10 MG: 5 TABLET ORAL at 09:55

## 2024-07-29 RX ADMIN — SODIUM CHLORIDE, PRESERVATIVE FREE 10 ML: 5 INJECTION INTRAVENOUS at 20:58

## 2024-07-29 RX ADMIN — GABAPENTIN 100 MG: 100 CAPSULE ORAL at 20:58

## 2024-07-29 RX ADMIN — MUPIROCIN: 20 OINTMENT TOPICAL at 21:08

## 2024-07-29 RX ADMIN — MEROPENEM 1000 MG: 1 INJECTION INTRAVENOUS at 18:19

## 2024-07-29 RX ADMIN — DICLOFENAC SODIUM 2 G: 10 GEL TOPICAL at 10:07

## 2024-07-29 RX ADMIN — MEROPENEM 1000 MG: 1 INJECTION INTRAVENOUS at 01:38

## 2024-07-29 RX ADMIN — MEROPENEM 1000 MG: 1 INJECTION INTRAVENOUS at 10:32

## 2024-07-29 RX ADMIN — Medication 12 UNITS: at 18:15

## 2024-07-29 RX ADMIN — GABAPENTIN 100 MG: 100 CAPSULE ORAL at 15:37

## 2024-07-29 RX ADMIN — GABAPENTIN 100 MG: 100 CAPSULE ORAL at 20:57

## 2024-07-29 RX ADMIN — FUROSEMIDE 20 MG: 20 TABLET ORAL at 21:09

## 2024-07-29 RX ADMIN — MONTELUKAST 10 MG: 10 TABLET, FILM COATED ORAL at 20:58

## 2024-07-29 RX ADMIN — GABAPENTIN 100 MG: 100 CAPSULE ORAL at 09:54

## 2024-07-29 RX ADMIN — DICLOFENAC SODIUM 2 G: 10 GEL TOPICAL at 21:07

## 2024-07-29 ASSESSMENT — PAIN DESCRIPTION - ORIENTATION: ORIENTATION: RIGHT;LEFT

## 2024-07-29 ASSESSMENT — PAIN - FUNCTIONAL ASSESSMENT: PAIN_FUNCTIONAL_ASSESSMENT: ACTIVITIES ARE NOT PREVENTED

## 2024-07-29 ASSESSMENT — PAIN SCALES - GENERAL: PAINLEVEL_OUTOF10: 6

## 2024-07-29 ASSESSMENT — PAIN DESCRIPTION - LOCATION: LOCATION: LEG

## 2024-07-29 ASSESSMENT — PAIN DESCRIPTION - DESCRIPTORS: DESCRIPTORS: ACHING

## 2024-07-29 NOTE — PLAN OF CARE
Problem: Discharge Planning  Goal: Discharge to home or other facility with appropriate resources  7/29/2024 0143 by Marlene Paulson RN  Outcome: Progressing  7/28/2024 1524 by Judie Morocho RN  Outcome: Progressing     Problem: Pain  Goal: Verbalizes/displays adequate comfort level or baseline comfort level  7/29/2024 0143 by Marlene Paulson RN  Outcome: Progressing  7/28/2024 1524 by Judie Morocho RN  Outcome: Progressing     Problem: Skin/Tissue Integrity  Goal: Absence of new skin breakdown  Description: 1.  Monitor for areas of redness and/or skin breakdown  2.  Assess vascular access sites hourly  3.  Every 4-6 hours minimum:  Change oxygen saturation probe site  4.  Every 4-6 hours:  If on nasal continuous positive airway pressure, respiratory therapy assess nares and determine need for appliance change or resting period.  7/29/2024 0143 by Marlene Paulson RN  Outcome: Progressing  7/28/2024 1524 by Judie Morocho RN  Outcome: Progressing     Problem: Safety - Adult  Goal: Free from fall injury  7/29/2024 0143 by Marlene Paulson RN  Outcome: Progressing  7/28/2024 1524 by Judie Morocho RN  Outcome: Progressing     Problem: ABCDS Injury Assessment  Goal: Absence of physical injury  7/29/2024 0143 by Marlene Paulson RN  Outcome: Progressing  7/28/2024 1524 by Judie Morocho RN  Outcome: Progressing     Problem: Neurosensory - Adult  Goal: Achieves stable or improved neurological status  7/29/2024 0143 by Marlene Paulson RN  Outcome: Progressing  7/28/2024 1524 by Judie Morocho RN  Outcome: Progressing  Goal: Absence of seizures  7/29/2024 0143 by Marlene Paulson RN  Outcome: Progressing  7/28/2024 1524 by Judie Morocho RN  Outcome: Progressing  Goal: Remains free of injury related to seizures activity  7/29/2024 0143 by Marlene Paulson RN  Outcome: Progressing  7/28/2024 1524 by Judie Morocho RN  Outcome: Progressing     Problem: Cardiovascular -  Adult  Goal: Maintains optimal cardiac output and hemodynamic stability  7/29/2024 0143 by Marlene Paulson RN  Outcome: Progressing  7/28/2024 1524 by Judie Morocho RN  Outcome: Progressing     Problem: Skin/Tissue Integrity - Adult  Goal: Skin integrity remains intact  7/29/2024 0143 by Marlene Paulson RN  Outcome: Progressing  7/28/2024 1524 by Judie Morocho RN  Outcome: Progressing     Problem: Musculoskeletal - Adult  Goal: Return mobility to safest level of function  7/29/2024 0143 by Marlene Paulson RN  Outcome: Progressing  7/28/2024 1524 by Judie Morocho RN  Outcome: Progressing     Problem: Genitourinary - Adult  Goal: Absence of urinary retention  7/29/2024 0143 by Marlene Paulson RN  Outcome: Progressing  7/28/2024 1524 by Judie Morocho RN  Outcome: Progressing

## 2024-07-29 NOTE — PROGRESS NOTES
Hospitalist Progress Note    NAME:   Siomara Collins   : 1945   MRN: [id number]     Date/Time: 2024 4:08 PM  Patient PCP: Irvin Vang MD    Estimated discharge date:   Barriers: ID recommendation      Assessment / Plan:        Recurrent ESBL Klebsiella UTI  -Hospitalized  with Klebsiella ESBL UTI, received meropenem and was discharged with plan for 3 more days of meropenem/ertapenem at skilled nursing facility.  Patient states that this was given to her once she got to the facility  - urine culture continue to growing ESBL Klebsiella and patient was started on Ceftin by her PCP, noted to have a Levaquin allergy  -Continues to have significant dysuria and burning with urination which is bothersome to her  -States that she saw urogynecology recently who noted no indication for intervention and wanted her to see infectious disease  -Is immunosuppressed secondary to chronic steroid use  -Urine culture showed possible UTI  -Meropenem started empirically in the emergency department  -Will continue IV meropenem for now  -Urine culture growing Klebsiella pneumonia, MDRO  -ID recommendation pending  Urology signed off     Type 2 diabetes  -Uncontrolled glucose despite increasing Lantus dose increased further today  Continue SSI     History of interstitial lung disease  -Continue home prednisone 30 mg daily     Lower extremity edema  -Patient reports now is on Lasix 20 mg 2 times per day, will continue     Polymyalgia rheumatica  Dyslipidemia  Hypertension  Tremor  Diabetic neuropathy  -cont' home steroids as above.  Currently blood pressure is stable.    -Continue statin, Coreg and also gabapentin  -Continue home primidone        Medical Decision Making:   I personally reviewed labs: CBC, CMP, UA  I personally reviewed imaging: N/A  I personally reviewed EKG: N/A  Toxic drug monitoring: Yes  Discussed case with: RN, patient     Code Status: DNR/DNI  DVT Prophylaxis: Lovenox    Subjective:  based      Comments   >50% of visit spent in counseling and coordination of care     ________________________________________________________________________  Pablo Roldan MD     Procedures: see electronic medical records for all procedures/Xrays and details which were not copied into this note but were reviewed prior to creation of Plan.      LABS:  I reviewed today's most current labs and imaging studies.  Pertinent labs include:  Recent Labs     07/27/24  0539 07/28/24  0207 07/29/24  0416   WBC 10.7 9.4 9.5   HGB 14.7 13.9 14.8   HCT 42.4 39.2 43.2    199 213       Recent Labs     07/27/24  0539 07/28/24  0207 07/29/24  0416    138 141   K 3.8 3.4* 3.7    100 104   CO2 31 31 32   GLUCOSE 282* 273* 109*   BUN 34* 32* 34*   CREATININE 0.98 0.84 0.80   CALCIUM 9.5 9.1 9.8   MG  --  2.0  --    BILITOT 0.5 0.5 0.6   AST 26 18 26   ALT 58 48 47         Signed: Pablo Roldan MD

## 2024-07-29 NOTE — PROGRESS NOTES
End of Shift Note    Bedside shift change report given to Judie JHA (oncoming nurse) by Marlene Paulson RN (offgoing nurse).  Report included the following information SBAR, Intake/Output, MAR, and Recent Results    At handoff report pt is awake in bed.   Shift worked:  night     Shift summary and any significant changes:       RN informed MD Bell that pt BG is 548. She's gonna get a scheduled lantus 10u tonight, How many units of lispro would u want to give?        MD ordered 15 units of Lispro one time dose       RN updated MD Bell reg pt BG of 400.        MD ordered another 15 units of Lispro one time.    ---------  -Pt slept most of the night  -Labs drawn  -Good urine output     0446H Latest Bmg/dl   Pt stated \"I feel better\"          Concerns for physician to address:   Blood glucose control, referral to diabetes specialist?      Zone phone for oncoming shift:    [phone]        Activity:     Number times ambulated in hallways past shift: 0  Number of times OOB to chair past shift: 2    Cardiac:   Cardiac Monitoring: No           Access:  Current line(s): PIV     Genitourinary:   Urinary status: voiding, incontinent, and external catheter    Respiratory:      Chronic home O2 use?: NO  Incentive spirometer at bedside: NO       GI:     Current diet:  ADULT DIET; Regular; 4 carb choices (60 gm/meal)  Passing flatus: YES  Tolerating current diet: YES       Pain Management:   Patient states pain is manageable on current regimen: YES    Skin:     Interventions: turn team, float heels, increase time out of bed, and internal/external urinary devices    Patient Safety:  Fall Score:    Interventions: bed/chair alarm, assistive device (walker, cane. etc), gripper socks, pt to call before getting OOB, stay with me (per policy), and gait belt       Length of Stay:  Expected LOS: 3  Actual LOS: 3      Marlene Paulson, YUDELKA

## 2024-07-29 NOTE — CONSULTS
Urology Consult    Patient: Siomara Collins MRN: [id number]  SSN: [id number]    YOB: 1945  Age: 79 y.o.  Sex: female          Date of Consultation:  July 29, 2024  Requesting Physician: Pablo Roldan MD  Reason for Consultation: dysuria            Assessment/Plan:  Recurrent ESBL UTI- poly resistance     - pt reports previous work up for her urologist Dr. Martins with negative biopsy and previous abdominal imaging with no infectious or obstructing process. I have no access to this previous cystoscopy uro/gyn work up by [facility].   -recent CT a/p 6/18 no acute  surgical process contributing to infection   -continue tight glycemic control with diabetic hx  -appears current meropenem treatment is sensitive   -may need to consider suppressive abx therapy for continued recurrent infections, discussed follow up with primary uro/gyn upon discharge  -signing off    Supervising MD, Dr. Schneider        History of Present Illness:  Patient is a 79 y.o. female admitted 7/26/2024 to the hospital for Hyperglycemia [R73.9]  ESBL (extended spectrum beta-lactamase) producing bacteria infection [A49.9, Z16.12]  Chronic UTI (urinary tract infection) [N39.0]  Klebsiella cystitis [N30.90, B96.1].  She has a PMHx of diabetes mellitus, stage II CKD, and PMR on chronic prednisone who is being seen for UTI. She follows with uro/gyn Dr. Martins at [facility]. Recent hospitalization for ESBL UTI with multiresistance and failed oral medication treatment ceftin and previously meropenem.  Ongoing ID workup. Pt currently denies flank or suprapubic pain. Only minor endorsement of dysuria at this time    EXAM: CT ABDOMEN PELVIS W IV CONTRAST 6/18/24       TECHNIQUE:   Following intravenous administration of contrast, thin axial images were  obtained through the abdomen and pelvis. Coronal and sagittal reconstructions  were generated. CT dose reduction was achieved through use of a standardized  protocol  gastrointestinal endoscopy; Colonoscopy; Cataract removal (Bilateral); heent; Appendectomy (1950); Breast biopsy (Bilateral); Mastectomy (Left, 2001); Breast surgery (1993, 2002); Mastectomy (Right, 1989); gi; and joint replacement.  PSocHx:  reports that she has never smoked. She has never used smokeless tobacco. She reports that she does not drink alcohol and does not use drugs.   ROS:  Admission ROS by Oneal Keller MD from 7/26/2024 were reviewed with the patient and changes (other than per HPI) include: none.    Physical Exam    General Appearance: NAD, awake  HENT: atraumatic, normal ears  Cardiovascular: not tachycardic, no LE edema  Respiratory: no distress, room air  Abdomen: soft, no suprapubic fullness or tenderness  : no CVA tenderness  Extremities: moves all  Musculoskeletal: normal alignment of neck and head  Neuro: Appropriate, no focal neurological deficits  Mood/Affect: appropriate, A&O x 3      Lab Results   Component Value Date/Time    WBC 9.5 07/29/2024 04:16 AM    HCT 43.2 07/29/2024 04:16 AM     07/29/2024 04:16 AM     07/29/2024 04:16 AM    K 3.7 07/29/2024 04:16 AM     07/29/2024 04:16 AM    CO2 32 07/29/2024 04:16 AM    BUN 34 07/29/2024 04:16 AM    MG 2.0 07/28/2024 02:07 AM    INR 1.1 12/11/2021 04:40 AM       UA: No results found for: \"COLOR\", \"CASTS\"      Signed By: Debbie Hardy, ROLANDO - NP  - July 29, 2024

## 2024-07-29 NOTE — PLAN OF CARE
Problem: Discharge Planning  Goal: Discharge to home or other facility with appropriate resources  7/29/2024 0254 by Marlene Paulson RN  Outcome: Progressing  7/29/2024 0143 by Marlene Paulson RN  Outcome: Progressing  7/28/2024 1524 by Judie Morocho RN  Outcome: Progressing     Problem: Pain  Goal: Verbalizes/displays adequate comfort level or baseline comfort level  7/29/2024 0254 by Marlene Paulson RN  Outcome: Progressing  7/29/2024 0143 by Marlene Paulson RN  Outcome: Progressing  7/28/2024 1524 by Judie Morocho RN  Outcome: Progressing     Problem: Skin/Tissue Integrity  Goal: Absence of new skin breakdown  Description: 1.  Monitor for areas of redness and/or skin breakdown  2.  Assess vascular access sites hourly  3.  Every 4-6 hours minimum:  Change oxygen saturation probe site  4.  Every 4-6 hours:  If on nasal continuous positive airway pressure, respiratory therapy assess nares and determine need for appliance change or resting period.  7/29/2024 0254 by Marlene Paulson RN  Outcome: Progressing  7/29/2024 0143 by Marlene Paulson RN  Outcome: Progressing  7/28/2024 1524 by Judie Morocho RN  Outcome: Progressing     Problem: Safety - Adult  Goal: Free from fall injury  7/29/2024 0254 by Marlene Paulson RN  Outcome: Progressing  7/29/2024 0143 by Marlene Paulson RN  Outcome: Progressing  7/28/2024 1524 by Judie Morocho RN  Outcome: Progressing     Problem: ABCDS Injury Assessment  Goal: Absence of physical injury  7/29/2024 0254 by Marlene Paulson RN  Outcome: Progressing  7/29/2024 0143 by Marlene Paulson RN  Outcome: Progressing  7/28/2024 1524 by Judie Morocho RN  Outcome: Progressing     Problem: Neurosensory - Adult  Goal: Achieves stable or improved neurological status  7/29/2024 0254 by Marlene Paulson RN  Outcome: Progressing  7/29/2024 0143 by Marlene Paulson RN  Outcome: Progressing  7/28/2024 1524 by Judie Morocho,

## 2024-07-30 ENCOUNTER — APPOINTMENT (OUTPATIENT)
Facility: HOSPITAL | Age: 79
End: 2024-07-30
Payer: MEDICARE

## 2024-07-30 PROBLEM — E11.65 UNCONTROLLED TYPE 2 DIABETES MELLITUS WITH HYPERGLYCEMIA (HCC): Status: ACTIVE | Noted: 2021-12-11

## 2024-07-30 PROBLEM — R73.09 HEMOGLOBIN A1C GREATER THAN 9%, INDICATING POOR DIABETIC CONTROL: Status: ACTIVE | Noted: 2024-07-30

## 2024-07-30 LAB
BASOPHILS # BLD: 0.1 K/UL (ref 0–0.1)
BASOPHILS NFR BLD: 1 % (ref 0–1)
DIFFERENTIAL METHOD BLD: ABNORMAL
EOSINOPHIL # BLD: 0 K/UL (ref 0–0.4)
EOSINOPHIL NFR BLD: 0 % (ref 0–7)
ERYTHROCYTE [DISTWIDTH] IN BLOOD BY AUTOMATED COUNT: 14.4 % (ref 11.5–14.5)
GLUCOSE BLD STRIP.AUTO-MCNC: 230 MG/DL (ref 65–117)
GLUCOSE BLD STRIP.AUTO-MCNC: 232 MG/DL (ref 65–117)
GLUCOSE BLD STRIP.AUTO-MCNC: 275 MG/DL (ref 65–117)
GLUCOSE BLD STRIP.AUTO-MCNC: 429 MG/DL (ref 65–117)
GLUCOSE BLD STRIP.AUTO-MCNC: 460 MG/DL (ref 65–117)
GLUCOSE BLD STRIP.AUTO-MCNC: 465 MG/DL (ref 65–117)
HCT VFR BLD AUTO: 41.6 % (ref 35–47)
HGB BLD-MCNC: 14.6 G/DL (ref 11.5–16)
IMM GRANULOCYTES # BLD AUTO: 0.1 K/UL (ref 0–0.04)
IMM GRANULOCYTES NFR BLD AUTO: 1 % (ref 0–0.5)
LYMPHOCYTES # BLD: 1.2 K/UL (ref 0.8–3.5)
LYMPHOCYTES NFR BLD: 12 % (ref 12–49)
MCH RBC QN AUTO: 35.1 PG (ref 26–34)
MCHC RBC AUTO-ENTMCNC: 35.1 G/DL (ref 30–36.5)
MCV RBC AUTO: 100 FL (ref 80–99)
MONOCYTES # BLD: 0.8 K/UL (ref 0–1)
MONOCYTES NFR BLD: 8 % (ref 5–13)
NEUTS SEG # BLD: 7.5 K/UL (ref 1.8–8)
NEUTS SEG NFR BLD: 78 % (ref 32–75)
NRBC # BLD: 0 K/UL (ref 0–0.01)
NRBC BLD-RTO: 0 PER 100 WBC
PLATELET # BLD AUTO: 236 K/UL (ref 150–400)
PMV BLD AUTO: 10.5 FL (ref 8.9–12.9)
RBC # BLD AUTO: 4.16 M/UL (ref 3.8–5.2)
SERVICE CMNT-IMP: ABNORMAL
WBC # BLD AUTO: 9.7 K/UL (ref 3.6–11)

## 2024-07-30 PROCEDURE — 2500000003 HC RX 250 WO HCPCS: Performed by: NURSE PRACTITIONER

## 2024-07-30 PROCEDURE — 6370000000 HC RX 637 (ALT 250 FOR IP): Performed by: STUDENT IN AN ORGANIZED HEALTH CARE EDUCATION/TRAINING PROGRAM

## 2024-07-30 PROCEDURE — 6370000000 HC RX 637 (ALT 250 FOR IP): Performed by: NURSE PRACTITIONER

## 2024-07-30 PROCEDURE — 85025 COMPLETE CBC W/AUTO DIFF WBC: CPT

## 2024-07-30 PROCEDURE — 82962 GLUCOSE BLOOD TEST: CPT

## 2024-07-30 PROCEDURE — 80053 COMPREHEN METABOLIC PANEL: CPT

## 2024-07-30 PROCEDURE — 6370000000 HC RX 637 (ALT 250 FOR IP)

## 2024-07-30 PROCEDURE — 6370000000 HC RX 637 (ALT 250 FOR IP): Performed by: INTERNAL MEDICINE

## 2024-07-30 PROCEDURE — 2580000003 HC RX 258: Performed by: STUDENT IN AN ORGANIZED HEALTH CARE EDUCATION/TRAINING PROGRAM

## 2024-07-30 PROCEDURE — 2580000003 HC RX 258: Performed by: INTERNAL MEDICINE

## 2024-07-30 PROCEDURE — 6360000002 HC RX W HCPCS: Performed by: STUDENT IN AN ORGANIZED HEALTH CARE EDUCATION/TRAINING PROGRAM

## 2024-07-30 PROCEDURE — 36415 COLL VENOUS BLD VENIPUNCTURE: CPT

## 2024-07-30 PROCEDURE — 6360000002 HC RX W HCPCS: Performed by: INTERNAL MEDICINE

## 2024-07-30 PROCEDURE — 71045 X-RAY EXAM CHEST 1 VIEW: CPT

## 2024-07-30 PROCEDURE — 1100000000 HC RM PRIVATE

## 2024-07-30 RX ORDER — GUAIFENESIN 200 MG/10ML
200 LIQUID ORAL EVERY 4 HOURS PRN
Status: DISCONTINUED | OUTPATIENT
Start: 2024-07-30 | End: 2024-08-02 | Stop reason: HOSPADM

## 2024-07-30 RX ORDER — INSULIN LISPRO 100 [IU]/ML
12 INJECTION, SOLUTION INTRAVENOUS; SUBCUTANEOUS ONCE
Status: COMPLETED | OUTPATIENT
Start: 2024-07-30 | End: 2024-07-30

## 2024-07-30 RX ORDER — INSULIN LISPRO 100 [IU]/ML
5 INJECTION, SOLUTION INTRAVENOUS; SUBCUTANEOUS ONCE
Status: COMPLETED | OUTPATIENT
Start: 2024-07-30 | End: 2024-07-30

## 2024-07-30 RX ORDER — INSULIN LISPRO 100 [IU]/ML
6 INJECTION, SOLUTION INTRAVENOUS; SUBCUTANEOUS
Status: DISCONTINUED | OUTPATIENT
Start: 2024-07-30 | End: 2024-08-02 | Stop reason: HOSPADM

## 2024-07-30 RX ORDER — CETIRIZINE HYDROCHLORIDE 10 MG/1
5 TABLET ORAL DAILY PRN
Status: DISCONTINUED | OUTPATIENT
Start: 2024-07-30 | End: 2024-08-02 | Stop reason: HOSPADM

## 2024-07-30 RX ADMIN — MUPIROCIN: 20 OINTMENT TOPICAL at 09:09

## 2024-07-30 RX ADMIN — FUROSEMIDE 20 MG: 20 TABLET ORAL at 09:06

## 2024-07-30 RX ADMIN — LISINOPRIL 10 MG: 5 TABLET ORAL at 09:06

## 2024-07-30 RX ADMIN — CETIRIZINE HYDROCHLORIDE 5 MG: 10 TABLET, FILM COATED ORAL at 23:23

## 2024-07-30 RX ADMIN — HYDROCHLOROTHIAZIDE 25 MG: 25 TABLET ORAL at 09:07

## 2024-07-30 RX ADMIN — PRIMIDONE 50 MG: 50 TABLET ORAL at 09:06

## 2024-07-30 RX ADMIN — INSULIN LISPRO 12 UNITS: 100 INJECTION, SOLUTION INTRAVENOUS; SUBCUTANEOUS at 14:50

## 2024-07-30 RX ADMIN — SODIUM CHLORIDE, PRESERVATIVE FREE 10 ML: 5 INJECTION INTRAVENOUS at 20:59

## 2024-07-30 RX ADMIN — INSULIN LISPRO 4 UNITS: 100 INJECTION, SOLUTION INTRAVENOUS; SUBCUTANEOUS at 00:04

## 2024-07-30 RX ADMIN — PRIMIDONE 50 MG: 50 TABLET ORAL at 20:57

## 2024-07-30 RX ADMIN — INSULIN LISPRO 5 UNITS: 100 INJECTION, SOLUTION INTRAVENOUS; SUBCUTANEOUS at 20:56

## 2024-07-30 RX ADMIN — MONTELUKAST 10 MG: 10 TABLET, FILM COATED ORAL at 20:58

## 2024-07-30 RX ADMIN — GUAIFENESIN 200 MG: 200 SOLUTION ORAL at 23:23

## 2024-07-30 RX ADMIN — GABAPENTIN 100 MG: 100 CAPSULE ORAL at 14:56

## 2024-07-30 RX ADMIN — DICLOFENAC SODIUM 2 G: 10 GEL TOPICAL at 20:59

## 2024-07-30 RX ADMIN — Medication 1000 UNITS: at 09:06

## 2024-07-30 RX ADMIN — Medication 1 UNITS: at 09:00

## 2024-07-30 RX ADMIN — INSULIN GLARGINE 15 UNITS: 100 INJECTION, SOLUTION SUBCUTANEOUS at 20:56

## 2024-07-30 RX ADMIN — PANTOPRAZOLE SODIUM 40 MG: 40 TABLET, DELAYED RELEASE ORAL at 06:09

## 2024-07-30 RX ADMIN — GABAPENTIN 100 MG: 100 CAPSULE ORAL at 20:57

## 2024-07-30 RX ADMIN — Medication 6 UNITS: at 18:03

## 2024-07-30 RX ADMIN — MEROPENEM 1000 MG: 1 INJECTION INTRAVENOUS at 09:34

## 2024-07-30 RX ADMIN — DICLOFENAC SODIUM 2 G: 10 GEL TOPICAL at 14:55

## 2024-07-30 RX ADMIN — PREDNISONE 30 MG: 5 TABLET ORAL at 09:06

## 2024-07-30 RX ADMIN — Medication 4 UNITS: at 18:04

## 2024-07-30 RX ADMIN — GABAPENTIN 100 MG: 100 CAPSULE ORAL at 09:06

## 2024-07-30 RX ADMIN — MEROPENEM 1000 MG: 1 INJECTION INTRAVENOUS at 02:43

## 2024-07-30 RX ADMIN — ROSUVASTATIN CALCIUM 20 MG: 20 TABLET, FILM COATED ORAL at 20:58

## 2024-07-30 RX ADMIN — MUPIROCIN: 20 OINTMENT TOPICAL at 20:59

## 2024-07-30 RX ADMIN — SODIUM CHLORIDE, PRESERVATIVE FREE 10 ML: 5 INJECTION INTRAVENOUS at 09:10

## 2024-07-30 RX ADMIN — INSULIN GLARGINE 50 UNITS: 100 INJECTION, SOLUTION SUBCUTANEOUS at 09:01

## 2024-07-30 RX ADMIN — MEROPENEM 1000 MG: 1 INJECTION INTRAVENOUS at 18:07

## 2024-07-30 ASSESSMENT — PAIN SCALES - GENERAL
PAINLEVEL_OUTOF10: 6
PAINLEVEL_OUTOF10: 0
PAINLEVEL_OUTOF10: 0

## 2024-07-30 ASSESSMENT — PAIN DESCRIPTION - ORIENTATION: ORIENTATION: RIGHT;LEFT

## 2024-07-30 ASSESSMENT — PAIN - FUNCTIONAL ASSESSMENT: PAIN_FUNCTIONAL_ASSESSMENT: ACTIVITIES ARE NOT PREVENTED

## 2024-07-30 ASSESSMENT — PAIN DESCRIPTION - DESCRIPTORS: DESCRIPTORS: ACHING

## 2024-07-30 ASSESSMENT — PAIN DESCRIPTION - LOCATION: LOCATION: LEG

## 2024-07-30 NOTE — PROGRESS NOTES
Nursing contacted Nocturnist/cross cover provider via non-urgent messaging system [email or web address] and notified patient lab result of accucheck 501, asymptomatic. No other concerns reported. No acute distress reported. No other information provided by nurse. VSS. Appears glucose has been trending elevated. Ordered inc HS lantus from 10 to 15 units start now, iv 10 units regular insulin, asked nurse to recheck glucose 1-2 hours, recheck 0200 daily ordered. Nurse aware to notify me if glucose remains above 350 on recheck. Will defer further evaluation/management to the day shift primary attending care team. Patient denies any further complaints or concerns. Nursing to notify Hospitalist for further/continued concerns. Will remain available overnight for further concerns if nursing/patient needs. Please note, there are RRT systems in this hospital in place that if nursing has acute or critical patient condition change or concern, this is to help facilitate and notify that patient needs immediate bedside evaluation by a provider.     Update  Nurse reported accucheck glucose 417, ordered lispro 4 units x1 now, no other concerns reported. NAD reported. Vss.     Non-billable note.

## 2024-07-30 NOTE — PLAN OF CARE
Problem: Discharge Planning  Goal: Discharge to home or other facility with appropriate resources  7/30/2024 0030 by Marlene Paulson RN  Outcome: Progressing  7/29/2024 2003 by Judie Morocho RN  Outcome: Progressing     Problem: Pain  Goal: Verbalizes/displays adequate comfort level or baseline comfort level  7/30/2024 0030 by Marlene Paulson RN  Outcome: Progressing  7/29/2024 2003 by Judie Morocho RN  Outcome: Progressing     Problem: Skin/Tissue Integrity  Goal: Absence of new skin breakdown  Description: 1.  Monitor for areas of redness and/or skin breakdown  2.  Assess vascular access sites hourly  3.  Every 4-6 hours minimum:  Change oxygen saturation probe site  4.  Every 4-6 hours:  If on nasal continuous positive airway pressure, respiratory therapy assess nares and determine need for appliance change or resting period.  7/30/2024 0030 by Marlene Paulson RN  Outcome: Progressing  7/29/2024 2003 by Judie Morocho RN  Outcome: Progressing     Problem: Safety - Adult  Goal: Free from fall injury  7/30/2024 0030 by Marlene Paulson RN  Outcome: Progressing  7/29/2024 2003 by Judie Morocho RN  Outcome: Progressing     Problem: ABCDS Injury Assessment  Goal: Absence of physical injury  7/30/2024 0030 by Marlene Paulson RN  Outcome: Progressing  7/29/2024 2003 by Judie Morocho RN  Outcome: Progressing     Problem: Neurosensory - Adult  Goal: Achieves stable or improved neurological status  7/30/2024 0030 by Marlene Paulson RN  Outcome: Progressing  7/29/2024 2003 by Judie Morocho RN  Outcome: Progressing  Goal: Absence of seizures  7/30/2024 0030 by Marlene Paulson RN  Outcome: Progressing  7/29/2024 2003 by Judie Morocho RN  Outcome: Progressing  Goal: Remains free of injury related to seizures activity  7/30/2024 0030 by Marlene Paulson RN  Outcome: Progressing  7/29/2024 2003 by Judie Morocho RN  Outcome: Progressing     Problem: Cardiovascular -

## 2024-07-30 NOTE — PROGRESS NOTES
End of Shift Note    Bedside shift change report given to Marlene JHA (oncoming nurse) by Norberto Marcelino LPN (offgoing nurse).  Report included the following information SBAR and MAR    Shift worked:  7 a to 7 p     Shift summary and any significant changes:     Patient up in wheelchair throughout the day. No complaints of pain. Blood sugars elevated in the 400s. No other complaints verbalized by this patient.     Concerns for physician to address:  Blood sugar?     Zone phone for oncoming shift:   [phone]       Activity:     Number times ambulated in hallways past shift: 0  Number of times OOB to chair past shift: 4    Cardiac:   Cardiac Monitoring: No           Access:  Current line(s): PIV     Genitourinary:   Urinary status: voiding    Respiratory:      Chronic home O2 use?: NO  Incentive spirometer at bedside: NO       GI:     Current diet:  ADULT DIET; Regular; 4 carb choices (60 gm/meal)  Passing flatus: YES  Tolerating current diet: YES       Pain Management:   Patient states pain is manageable on current regimen: YES    Skin:     Interventions: float heels and increase time out of bed    Patient Safety:  Fall Score:    Interventions: gripper socks and pt to call before getting OOB       Length of Stay:  Expected LOS: 5  Actual LOS: 4      Norberto Marcelino LPN

## 2024-07-30 NOTE — PROGRESS NOTES
End of Shift Note    Bedside shift change report given to Norberto WU  (oncoming nurse) by Marlene Paulson RN (offgoing nurse).  Report included the following information SBAR, Intake/Output, and MAR    At handoff report pt is sleeping in bed easily arousable.     Shift worked:  Night     Shift summary and any significant changes:       RN informed NP Schwana that pt latest BG is 501.Pt has scheduled 10u Lantus tonight. RN asking total units of Lispro to give.        NP ordered to increase Lantus to 15 units, then given Regular insulin IV once. Will defer Lispro SSI. Recheck BG 1 or 2 hours after administration.        Latest Bmg/dl          NP ordered Lispro 4units SC       Latest Bmg/dl. NP made aware    -----------------------------------------   -Pt slept most of the night   -Good urine output   -No complaints overnight         Concerns for physician to address:   Pending consult to diabetes management      [facility]    [facility]       Activity:     Number times ambulated in hallways past shift: 0  Number of times OOB to chair past shift: 2    Cardiac:   Cardiac Monitoring: No           Access:  Current line(s): PIV     Genitourinary:   Urinary status: voiding , ext catheter    Respiratory:      Chronic home O2 use?: NO  Incentive spirometer at bedside: NO       GI:     Current diet:  ADULT DIET; Regular; 4 carb choices (60 gm/meal)  Passing flatus: YES  Tolerating current diet: YES       Pain Management:   Patient states pain is manageable on current regimen: N/A    Skin:     Interventions: turn team, float heels, and increase time out of bed    Patient Safety:  Fall Score:    Interventions: bed/chair alarm, assistive device (walker, cane. etc), gripper socks, pt to call before getting OOB, stay with me (per policy), and gait belt       Length of Stay:  Expected LOS: 5  Actual LOS: 4      Marlene Paulson, RN

## 2024-07-30 NOTE — PROGRESS NOTES
Hospitalist Progress Note    NAME:   Siomara Collins   : 1945   MRN: [id number]     Date/Time: 2024 4:10 PM  Patient PCP: Irvin Vang MD    Estimated discharge date:   Barriers: ID recommendation      Assessment / Plan:        Recurrent ESBL Klebsiella UTI  -Hospitalized  with Klebsiella ESBL UTI, received meropenem and was discharged with plan for 3 more days of meropenem/ertapenem at skilled nursing facility.  Patient states that this was given to her once she got to the facility  - urine culture continue to growing ESBL Klebsiella and patient was started on Ceftin by her PCP, noted to have a Levaquin allergy  -Continues to have significant dysuria and burning with urination which is bothersome to her  -States that she saw urogynecology recently who noted no indication for intervention and wanted her to see infectious disease  -Is immunosuppressed secondary to chronic steroid use  -Urine culture showed possible UTI  -Meropenem started empirically in the emergency department  - continue IV meropenem for now  -Urine culture growing Klebsiella pneumonia, MDRO  -ID recommendation pending  Urology signed off     Type 2 diabetes    Continue SSI  Continue Lantus 50 in  a.m. and 10 unit nightly     History of interstitial lung disease  -Continue home prednisone 30 mg daily     Lower extremity edema  -Patient reports now is on Lasix 20 mg 2 times per day, will continue     Polymyalgia rheumatica  Dyslipidemia  Hypertension  Tremor  Diabetic neuropathy  -cont' home steroids as above.  Currently blood pressure is stable.    -Continue statin, Coreg and also gabapentin  -Continue home primidone        Medical Decision Making:   I personally reviewed labs: CBC, CMP, UA  I personally reviewed imaging: N/A  I personally reviewed EKG: N/A  Toxic drug monitoring: Yes  Discussed case with: RN, patient     Code Status: DNR/DNI  DVT Prophylaxis: Lovenox    Subjective:     Chief Complaint / Reason

## 2024-07-30 NOTE — PLAN OF CARE
Problem: Discharge Planning  Goal: Discharge to home or other facility with appropriate resources  Outcome: Progressing     Problem: Pain  Goal: Verbalizes/displays adequate comfort level or baseline comfort level  Outcome: Progressing     Problem: Skin/Tissue Integrity  Goal: Absence of new skin breakdown  Description: 1.  Monitor for areas of redness and/or skin breakdown  2.  Assess vascular access sites hourly  3.  Every 4-6 hours minimum:  Change oxygen saturation probe site  4.  Every 4-6 hours:  If on nasal continuous positive airway pressure, respiratory therapy assess nares and determine need for appliance change or resting period.  Outcome: Progressing     Problem: Safety - Adult  Goal: Free from fall injury  Outcome: Progressing     Problem: ABCDS Injury Assessment  Goal: Absence of physical injury  Outcome: Progressing     Problem: Neurosensory - Adult  Goal: Achieves stable or improved neurological status  Outcome: Progressing  Goal: Absence of seizures  Outcome: Progressing  Goal: Remains free of injury related to seizures activity  Outcome: Progressing     Problem: Cardiovascular - Adult  Goal: Maintains optimal cardiac output and hemodynamic stability  Outcome: Progressing     Problem: Skin/Tissue Integrity - Adult  Goal: Skin integrity remains intact  Outcome: Progressing     Problem: Musculoskeletal - Adult  Goal: Return mobility to safest level of function  Outcome: Progressing     Problem: Genitourinary - Adult  Goal: Absence of urinary retention  Outcome: Progressing

## 2024-07-30 NOTE — CARE COORDINATION
Transition of Care Plan:    RUR: 26%  Prior Level of Functioning: needs assistance   Disposition: return to halfway vs SNF pending ID recs   If SNF or IPR: Date FOC offered: N/A thus far  Follow up appointments: PCP, specialists as indicated   DME needed: N/A  Transportation at discharge: medical transport   IM/IMM Medicare/ letter given: to be given   Is patient a  and connected with VA? no   If yes, was [place] transfer form completed and [facility] notified?   Caregiver Contact: Jimi Verdin (son) [phone]  Discharge Caregiver contacted prior to discharge? To be contacted prior to d/c   Care Conference needed? no  Barriers to discharge:  ID clearance, final recommendations     Chart reviewed. CM continuing to follow for d/c planning. Pt not yet medically stable for d/c at this time. Awaiting final ID recommendations/clearance. Contacted [facility]- spoke with Carmencita. Pt is able to return tomorrow unless she requires IV ABX- cannot return to halfway with IV medications. If IV ABX are required, pt will need SNF placement.     If pt cleared for d/c tomorrow on PO abx- CM to fax d/c summary to [facility] [phone]. RN will need to call report to main number. Will continue to follow.    RACHEL Goddard   Care Manager, [facility]  [phone]

## 2024-07-30 NOTE — DIABETES MGMT
[facility]  PROGRAM FOR DIABETES HEALTH  DIABETES MANAGEMENT CONSULT    Consulted by Provider for advanced nursing evaluation and care for inpatient blood glucose management.    Evaluation and Action Plan   Siomara Collins is a 79 year old female with a hx of uncontrolled Type 2 diabetes. The patient reportedly takes Lantus 50 units in the morning and 15 units nightly. She also takes corrective scale Humalog. It is noted that she is taking long term prednisone for a chronic lung problem. I suspect the steroid is playing a role in persistent hyperglycemia.  The patient is admitted after experience painful urination, bloating and frequency. The patient reports several admissions related to UTI since January 2024. Diabetes management has been consulted to assist with inpatient hospital blood glucose control and evaluation of home medication regimen. I have ordered NPH to linked with prednisone dose. I will monitor BG trends and make further recommendations as needed.     Management Rationale Action Plan   Medication   Basal needs Using Home dose  Using 50 units Lantus in the morning and 15 units nightly   Nutritional needs Using medium sensitivity based on weight  Use 6 units Humalog with each meal consumed   Corrective insulin Using medium dose sensitivity based on weight    Overriding steroid effect Using 0.2 units/kg/D based on weight  Link 18 units NPH with each 30 mg prednisone   Additional orders          Diabetes Discharge Plan   Medication  TBD   Referral  [x]        Outpatient diabetes education   Additional orders            Initial Presentation   Siomara Collins is a 79 y.o. female admitted  after experiencing painful urination, bloating and frequency  LAB: Glucose 395. A1c 10.2%.        HX:   Past Medical History:   Diagnosis Date    Abnormal LFTs 08/24/2017    FATTY LIVER    Arthritis     OSTEO    Avascular necrosis of hip (HCC) 08/24/2017    BILAT HIPS    Breast CA (HCC) 1989, 2001    BILATERAL; SURGERY,  self-management adjustments    Taking medications pattern  [x] Consistent administration  [x] Affordable  Reducing risks     [x] Pneumonia: 07/27/2015      Social determinants of health impacting diabetes self-management practices    Concerned that you need to know more about how to stay healthy with diabetes    Overall evaluation:    [x]  Not achieving A1c target with drug therapy & self-care practices    Subjective   ”I 've had this UTI for 6 months.”     Objective   Physical exam  General Obese female in no acute distress. Conversant and cooperative  Neuro  Alert, oriented   Vital Signs   Vitals:    07/30/24 0900   BP: (!) 109/57   Pulse: 83   Resp: 18   Temp: 98.2 °F (36.8 °C)   SpO2: 100%         Laboratory  Recent Labs     07/28/24  0207 07/29/24  0416   WBC 9.4 9.5   HGB 13.9 14.8   HCT 39.2 43.2   MCV 97.8 101.2*    213       Recent Labs     07/28/24  0207 07/29/24 0416    141   K 3.4* 3.7    104   CO2 31 32   BUN 32* 34*   CREATININE 0.84 0.80       Lab Results   Component Value Date    ALT 47 07/29/2024    AST 26 07/29/2024    ALKPHOS 70 07/29/2024    BILITOT 0.6 07/29/2024     Lab Results   Component Value Date    TSH 0.23 (L) 05/13/2024     Lab Results   Component Value Date    LABA1C 10.2 (H) 07/02/2024    LABA1C 10.1 (H) 05/13/2024    LABA1C 7.2 (H) 01/17/2023       Factors impacting BG management  Factor Dose Comments   Nutrition:  Standard meals     60 grams/meal      Drugs:    Steroids       prednisone       Impairs insulin action     Pain PRN acetaminophn    Infection Merrem    Other:   Kidney function  Liver function     Normal  Normal      Blood glucose pattern        Assessment and Nursing Intervention   Nursing Diagnosis Risk for unstable blood glucose pattern   Nursing Intervention Domain 5250 Decision-making Support   Nursing Interventions Examined current inpatient diabetes/blood glucose control   Explored factors facilitating and impeding inpatient management  Explored

## 2024-07-30 NOTE — DIABETES MGMT
self-management adjustments    Taking medications pattern  [x] Consistent administration  [x] Affordable  Reducing risks     [x] Pneumonia: 07/27/2015      Social determinants of health impacting diabetes self-management practices    Concerned that you need to know more about how to stay healthy with diabetes    Overall evaluation:    [x]  Not achieving A1c target with drug therapy & self-care practices    Subjective   ”I 've had this UTI for 6 months.”     Objective   Physical exam  General Obese female in no acute distress. Conversant and cooperative  Neuro  Alert, oriented   Vital Signs   Vitals:    07/30/24 0900   BP: (!) 109/57   Pulse: 83   Resp: 18   Temp: 98.2 °F (36.8 °C)   SpO2: 100%         Laboratory  Recent Labs     07/28/24  0207 07/29/24  0416   WBC 9.4 9.5   HGB 13.9 14.8   HCT 39.2 43.2   MCV 97.8 101.2*    213     Recent Labs     07/28/24  0207 07/29/24 0416    141   K 3.4* 3.7    104   CO2 31 32   BUN 32* 34*   CREATININE 0.84 0.80     Lab Results   Component Value Date    ALT 47 07/29/2024    AST 26 07/29/2024    ALKPHOS 70 07/29/2024    BILITOT 0.6 07/29/2024     Lab Results   Component Value Date    TSH 0.23 (L) 05/13/2024     Lab Results   Component Value Date    LABA1C 10.2 (H) 07/02/2024    LABA1C 10.1 (H) 05/13/2024    LABA1C 7.2 (H) 01/17/2023       Factors impacting BG management  Factor Dose Comments   Nutrition:  Standard meals     60 grams/meal      Drugs:    Steroids       prednisone       Impairs insulin action     Pain PRN acetaminophn    Infection Merrem    Other:   Kidney function  Liver function     Normal  Normal      Blood glucose pattern        Assessment and Nursing Intervention   Nursing Diagnosis Risk for unstable blood glucose pattern   Nursing Intervention Domain 5250 Decision-making Support   Nursing Interventions Examined current inpatient diabetes/blood glucose control   Explored factors facilitating and impeding inpatient management  Explored  corrective strategies with patient and responsible inpatient provider   Informed patient of rational for insulin strategy while hospitalized     Nursing Diagnosis 44103 Ineffective Health Management   Nursing Intervention Domain 5250 Decision-making Support   Nursing Interventions Identified diabetes self-management practices impeding diabetes control  Discussed diabetes survival skills related to  Healthy Plate eating plan; given handouts  Role of physical activity in improving insulin sensitivity and action  Procedure for blood glucose monitoring & options for low-cost products  Medications plan at discharge     Billing Code(s)   [] 38231 IP subsequent hospital care - 50 minutes   [] 96434 IP subsequent hospital care - 35 minutes   [] 53993 IP subsequent hospital care - 25 minutes   [x] 29346 IP initial hospital care - 40 minutes     Before making these care recommendations, I personally reviewed the hospitalization record, including notes, laboratory & diagnostic data and current medications, and examined the patient at the bedside.  Total minutes: 40 minutes    ROLANDO Boyd - CNS  Diabetes Clinical Nurse Specialist  [facility]  Access via UserTesting

## 2024-07-31 LAB
ALBUMIN SERPL-MCNC: 3.2 G/DL (ref 3.5–5)
ALBUMIN/GLOB SERPL: 1 (ref 1.1–2.2)
ALP SERPL-CCNC: 85 U/L (ref 45–117)
ALT SERPL-CCNC: 63 U/L (ref 12–78)
ANION GAP SERPL CALC-SCNC: 7 MMOL/L (ref 5–15)
AST SERPL-CCNC: 38 U/L (ref 15–37)
B PERT DNA SPEC QL NAA+PROBE: NOT DETECTED
BILIRUB SERPL-MCNC: 0.6 MG/DL (ref 0.2–1)
BORDETELLA PARAPERTUSSIS BY PCR: NOT DETECTED
BUN SERPL-MCNC: 47 MG/DL (ref 6–20)
BUN/CREAT SERPL: 47 (ref 12–20)
C PNEUM DNA SPEC QL NAA+PROBE: NOT DETECTED
CALCIUM SERPL-MCNC: 9.8 MG/DL (ref 8.5–10.1)
CHLORIDE SERPL-SCNC: 98 MMOL/L (ref 97–108)
CO2 SERPL-SCNC: 29 MMOL/L (ref 21–32)
CREAT SERPL-MCNC: 0.99 MG/DL (ref 0.55–1.02)
FLUAV SUBTYP SPEC NAA+PROBE: NOT DETECTED
FLUBV RNA SPEC QL NAA+PROBE: NOT DETECTED
GLOBULIN SER CALC-MCNC: 3.2 G/DL (ref 2–4)
GLUCOSE BLD STRIP.AUTO-MCNC: 205 MG/DL (ref 65–117)
GLUCOSE BLD STRIP.AUTO-MCNC: 368 MG/DL (ref 65–117)
GLUCOSE BLD STRIP.AUTO-MCNC: 380 MG/DL (ref 65–117)
GLUCOSE BLD STRIP.AUTO-MCNC: 578 MG/DL (ref 65–117)
GLUCOSE SERPL-MCNC: 262 MG/DL (ref 65–100)
HADV DNA SPEC QL NAA+PROBE: NOT DETECTED
HCOV 229E RNA SPEC QL NAA+PROBE: NOT DETECTED
HCOV HKU1 RNA SPEC QL NAA+PROBE: NOT DETECTED
HCOV NL63 RNA SPEC QL NAA+PROBE: NOT DETECTED
HCOV OC43 RNA SPEC QL NAA+PROBE: NOT DETECTED
HMPV RNA SPEC QL NAA+PROBE: NOT DETECTED
HPIV1 RNA SPEC QL NAA+PROBE: NOT DETECTED
HPIV2 RNA SPEC QL NAA+PROBE: NOT DETECTED
HPIV3 RNA SPEC QL NAA+PROBE: NOT DETECTED
HPIV4 RNA SPEC QL NAA+PROBE: NOT DETECTED
M PNEUMO DNA SPEC QL NAA+PROBE: NOT DETECTED
POTASSIUM SERPL-SCNC: 4 MMOL/L (ref 3.5–5.1)
PROT SERPL-MCNC: 6.4 G/DL (ref 6.4–8.2)
RSV RNA SPEC QL NAA+PROBE: NOT DETECTED
RV+EV RNA SPEC QL NAA+PROBE: DETECTED
SARS-COV-2 RNA RESP QL NAA+PROBE: NOT DETECTED
SERVICE CMNT-IMP: ABNORMAL
SODIUM SERPL-SCNC: 134 MMOL/L (ref 136–145)

## 2024-07-31 PROCEDURE — 0202U NFCT DS 22 TRGT SARS-COV-2: CPT

## 2024-07-31 PROCEDURE — 6370000000 HC RX 637 (ALT 250 FOR IP)

## 2024-07-31 PROCEDURE — 6360000002 HC RX W HCPCS: Performed by: INTERNAL MEDICINE

## 2024-07-31 PROCEDURE — 1100000000 HC RM PRIVATE

## 2024-07-31 PROCEDURE — 6370000000 HC RX 637 (ALT 250 FOR IP): Performed by: STUDENT IN AN ORGANIZED HEALTH CARE EDUCATION/TRAINING PROGRAM

## 2024-07-31 PROCEDURE — 6370000000 HC RX 637 (ALT 250 FOR IP): Performed by: INTERNAL MEDICINE

## 2024-07-31 PROCEDURE — 2580000003 HC RX 258: Performed by: STUDENT IN AN ORGANIZED HEALTH CARE EDUCATION/TRAINING PROGRAM

## 2024-07-31 PROCEDURE — 82962 GLUCOSE BLOOD TEST: CPT

## 2024-07-31 PROCEDURE — 2580000003 HC RX 258: Performed by: INTERNAL MEDICINE

## 2024-07-31 RX ORDER — CASTOR OIL AND BALSAM, PERU 788; 87 MG/G; MG/G
OINTMENT TOPICAL 2 TIMES DAILY
Status: DISCONTINUED | OUTPATIENT
Start: 2024-07-31 | End: 2024-08-02 | Stop reason: HOSPADM

## 2024-07-31 RX ADMIN — Medication 4 UNITS: at 19:02

## 2024-07-31 RX ADMIN — MEROPENEM 1000 MG: 1 INJECTION INTRAVENOUS at 01:41

## 2024-07-31 RX ADMIN — ROSUVASTATIN CALCIUM 20 MG: 20 TABLET, FILM COATED ORAL at 20:49

## 2024-07-31 RX ADMIN — MONTELUKAST 10 MG: 10 TABLET, FILM COATED ORAL at 20:49

## 2024-07-31 RX ADMIN — PREDNISONE 30 MG: 5 TABLET ORAL at 09:53

## 2024-07-31 RX ADMIN — INSULIN GLARGINE 50 UNITS: 100 INJECTION, SOLUTION SUBCUTANEOUS at 09:55

## 2024-07-31 RX ADMIN — Medication 6 UNITS: at 09:55

## 2024-07-31 RX ADMIN — HUMAN INSULIN 18 UNITS: 100 INJECTION, SUSPENSION SUBCUTANEOUS at 09:55

## 2024-07-31 RX ADMIN — INSULIN LISPRO 4 UNITS: 100 INJECTION, SOLUTION INTRAVENOUS; SUBCUTANEOUS at 20:51

## 2024-07-31 RX ADMIN — SODIUM CHLORIDE, PRESERVATIVE FREE 10 ML: 5 INJECTION INTRAVENOUS at 10:17

## 2024-07-31 RX ADMIN — ACETAMINOPHEN 650 MG: 325 TABLET ORAL at 09:53

## 2024-07-31 RX ADMIN — MUPIROCIN: 20 OINTMENT TOPICAL at 10:21

## 2024-07-31 RX ADMIN — Medication 6 UNITS: at 13:00

## 2024-07-31 RX ADMIN — HYDROCHLOROTHIAZIDE 25 MG: 25 TABLET ORAL at 09:53

## 2024-07-31 RX ADMIN — FUROSEMIDE 20 MG: 20 TABLET ORAL at 09:54

## 2024-07-31 RX ADMIN — Medication 6 UNITS: at 19:02

## 2024-07-31 RX ADMIN — PANTOPRAZOLE SODIUM 40 MG: 40 TABLET, DELAYED RELEASE ORAL at 06:19

## 2024-07-31 RX ADMIN — PRIMIDONE 50 MG: 50 TABLET ORAL at 20:49

## 2024-07-31 RX ADMIN — MUPIROCIN: 20 OINTMENT TOPICAL at 20:58

## 2024-07-31 RX ADMIN — MEROPENEM 1000 MG: 1 INJECTION INTRAVENOUS at 10:24

## 2024-07-31 RX ADMIN — Medication 1 UNITS: at 10:17

## 2024-07-31 RX ADMIN — Medication 1000 UNITS: at 09:53

## 2024-07-31 RX ADMIN — SODIUM CHLORIDE, PRESERVATIVE FREE 10 ML: 5 INJECTION INTRAVENOUS at 20:50

## 2024-07-31 RX ADMIN — GABAPENTIN 100 MG: 100 CAPSULE ORAL at 20:49

## 2024-07-31 RX ADMIN — Medication: at 10:25

## 2024-07-31 RX ADMIN — Medication 4 UNITS: at 13:01

## 2024-07-31 RX ADMIN — INSULIN HUMAN 12 UNITS: 100 INJECTION, SUSPENSION SUBCUTANEOUS at 20:50

## 2024-07-31 RX ADMIN — GABAPENTIN 100 MG: 100 CAPSULE ORAL at 09:54

## 2024-07-31 RX ADMIN — FUROSEMIDE 20 MG: 20 TABLET ORAL at 20:49

## 2024-07-31 RX ADMIN — PRIMIDONE 50 MG: 50 TABLET ORAL at 09:54

## 2024-07-31 RX ADMIN — Medication: at 20:57

## 2024-07-31 RX ADMIN — MEROPENEM 1000 MG: 1 INJECTION INTRAVENOUS at 19:04

## 2024-07-31 ASSESSMENT — PAIN SCALES - GENERAL
PAINLEVEL_OUTOF10: 5
PAINLEVEL_OUTOF10: 7

## 2024-07-31 ASSESSMENT — PAIN DESCRIPTION - LOCATION: LOCATION: LEG;BACK

## 2024-07-31 NOTE — PROGRESS NOTES
End of Shift Note    Bedside shift change report given to Norberto WU (oncoming nurse) by Marlene Paulson RN (offgoing nurse).  Report included the following information SBAR, Intake/Output, MAR, and Recent Results    At handoff report pt is sleeping in bed easily arousable.   Shift worked:  Night     Shift summary and any significant changes:    2018H RN informed NP [place] that pt BG is 460. she has scheduled 15units Lantus. Writer is asking for total units of Lispro to give.     2025H Provider ordered 5units of Lispro.     2241H RN informed NP that pt is requesting for a cough meds, she has productive cough and also something for her runny nose.    Provider ordered the following:  -Zyrtec & PRN robitussin   -Stat CBC, CMP & CXR  ------------------------  -Pt slept good after receiving her cough & allergy meds.   -Unable to visualize sputum because pt threw it away. Clear BS   -Labs drawn  -CXR result pending  -Venelex ordered for her Non blanchable redness on mid buttocks      Concerns for physician to address:   - Blood sugar control      Zone phone for oncoming shift:           Activity:     Number times ambulated in hallways past shift: 0  Number of times OOB to chair past shift: 1    Cardiac:   Cardiac Monitoring: No           Access:  Current line(s): PIV     Genitourinary:   Urinary status: voiding, incontinent, and external catheter    Respiratory:      Chronic home O2 use?: NO  Incentive spirometer at bedside: NO       GI:     Current diet:  ADULT DIET; Regular; 4 carb choices (60 gm/meal)  Passing flatus: YES  Tolerating current diet: YES       Pain Management:   Patient states pain is manageable on current regimen: NO    Skin:     Interventions: turn team, float heels, and increase time out of bed    Patient Safety:  Fall Score:    Interventions: bed/chair alarm, assistive device (walker, cane. etc), gripper socks, pt to call before getting OOB, and stay with me (per policy)       Length of

## 2024-07-31 NOTE — PROGRESS NOTES
Checks  -Hypoglycemia protocol  -diabetic diet    Discussed case with: RN, patient  ID- Dr. Woody- will need to treat with iv antibiotics     Code Status: DNR/DNI  DVT Prophylaxis: Lovenox    Subjective:     Chief Complaint / Reason for Physician Visit  Follow-up for possible UTI  Patient denies any new complaints.       Objective:     VITALS:   Last 24hrs VS reviewed since prior progress note. Most recent are:  Patient Vitals for the past 24 hrs:   BP Temp Temp src Pulse Resp SpO2   07/31/24 0945 91/78 97.7 °F (36.5 °C) Oral 85 16 --   07/31/24 0400 100/60 98.4 °F (36.9 °C) Oral 80 14 98 %   07/30/24 2147 98/60 -- -- 88 -- --   07/30/24 2058 92/63 -- -- -- -- --   07/30/24 1913 90/60 98.2 °F (36.8 °C) Oral 89 15 98 %           Intake/Output Summary (Last 24 hours) at 7/31/2024 1646  Last data filed at 7/31/2024 0444  Gross per 24 hour   Intake 400 ml   Output 1000 ml   Net -600 ml          I had a face to face encounter and independently examined this patient on 7/31/2024, as outlined below:  PHYSICAL EXAM:  General: Alert, cooperative  EENT:  EOMI. Anicteric sclerae.  Resp:  CTA bilaterally, no wheezing or rales.  No accessory muscle use  CV:  Regular  rhythm, trace pedal edema bilateral legs  GI:  Soft, Non distended, Non tender.  +Bowel sounds  Neurologic:  Alert and oriented X 3, normal speech,   Psych:   Good insight. Not anxious nor agitated  Skin:  No rashes.  No jaundice    Reviewed most current lab test results and cultures  YES  Reviewed most current radiology test results   YES  Review and summation of old records today    NO  Reviewed patient's current orders and MAR    YES  PMH/SH reviewed - no change compared to H&P  ___________________________________  Anitha Corrales MD     Procedures: see electronic medical records for all procedures/Xrays and details which were not copied into this note but were reviewed prior to creation of Plan.      LABS:  I reviewed today's most current labs and imaging  studies.  Pertinent labs include:  Recent Labs     07/29/24  0416 07/30/24  2321   WBC 9.5 9.7   HGB 14.8 14.6   HCT 43.2 41.6    236       Recent Labs     07/29/24  0416 07/30/24  2321    134*   K 3.7 4.0    98   CO2 32 29   GLUCOSE 109* 262*   BUN 34* 47*   CREATININE 0.80 0.99   CALCIUM 9.8 9.8   BILITOT 0.6 0.6   AST 26 38*   ALT 47 63         Signed: Anitha Corrales MD

## 2024-07-31 NOTE — PROGRESS NOTES
Nursing contacted Nocturnist/cross cover provider via non-urgent messaging system [employer] and notified patient lab result of Accu-Chek 460 glucose, asymptomatic. No other concerns reported. No acute distress reported. No other information provided by nurse. VSS. Ordered 5 units total lispro x 1 now, nurse to give ordered Lantus as already prescribed. Will defer further evaluation/management to the day shift primary attending care team. Patient denies any further complaints or concerns. Nursing to notify Hospitalist for further/continued concerns. Will remain available overnight for further concerns if nursing/patient needs. Please note, there are RRT systems in this hospital in place that if nursing has acute or critical patient condition change or concern, this is to help facilitate and notify that patient needs immediate bedside evaluation by a provider.     Update:  Nurse now reports that patient is reporting a productive sputum cough and also runny nose, and is asking for medication for coughing and for the runny nose.  No other concerns reported.  No acute distress reported.  Vital signs stable.  No other information provided by nurse.  Awaiting clarification from nurse via [employer] for the coloration for the productive sputum that is being reported, and for what the lungs sound like, particularly adventitious lung sounds-awaiting response.  Also asked nurse to please notify me when the chest x-ray results so I may review.  Stat chest x-ray-pending, rule out infectious processes.  Stat CBC, stat CMP with reflex to mag-pending, Mucinex as needed liquid, Zyrtec daily as needed.    Update:  0406 still no response back from nurse via [employer].  Chest x-ray was done pending radiological reading at present.  Appears recent vital signs for 0400 documented as stable.    Update:  Nurse reported patient threw away her sputum so nurse was unable to visualize.  States that patient has been asleep after

## 2024-07-31 NOTE — PLAN OF CARE
Problem: Discharge Planning  Goal: Discharge to home or other facility with appropriate resources  Outcome: Progressing     Problem: Pain  Goal: Verbalizes/displays adequate comfort level or baseline comfort level  Outcome: Progressing     Problem: Skin/Tissue Integrity  Goal: Absence of new skin breakdown  Description: 1.  Monitor for areas of redness and/or skin breakdown  2.  Assess vascular access sites hourly  3.  Every 4-6 hours minimum:  Change oxygen saturation probe site  4.  Every 4-6 hours:  If on nasal continuous positive airway pressure, respiratory therapy assess nares and determine need for appliance change or resting period.  Outcome: Progressing     Problem: Safety - Adult  Goal: Free from fall injury  Outcome: Progressing     Problem: ABCDS Injury Assessment  Goal: Absence of physical injury  Outcome: Progressing     Problem: Neurosensory - Adult  Goal: Achieves stable or improved neurological status  Outcome: Progressing  Goal: Absence of seizures  Outcome: Progressing  Goal: Remains free of injury related to seizures activity  Outcome: Progressing     Problem: Cardiovascular - Adult  Goal: Maintains optimal cardiac output and hemodynamic stability  Outcome: Progressing     Problem: Skin/Tissue Integrity - Adult  Goal: Skin integrity remains intact  Outcome: Progressing  Flowsheets (Taken 7/30/2024 1913)  Skin Integrity Remains Intact: Monitor for areas of redness and/or skin breakdown     Problem: Musculoskeletal - Adult  Goal: Return mobility to safest level of function  Outcome: Progressing     Problem: Genitourinary - Adult  Goal: Absence of urinary retention  Outcome: Progressing

## 2024-07-31 NOTE — DIABETES MGMT
[facility] HEALTH  DIABETES MANAGEMENT CONSULT    Consulted by Provider for advanced nursing evaluation and care for inpatient blood glucose management.    Evaluation and Action Plan   Siomara Collins is a 79 year old female with a hx of uncontrolled Type 2 diabetes. The patient reportedly takes Lantus 50 units in the morning and 15 units nightly. She also takes corrective scale Humalog. It is noted that she is taking long term prednisone for a chronic lung problem. I suspect the steroid is playing a role in persistent hyperglycemia.  The patient is admitted after experience painful urination, bloating and frequency. The patient reports several admissions related to UTI since January 2024. Diabetes management has been consulted to assist with inpatient hospital blood glucose control and evaluation of home medication regimen. I have ordered NPH to linked with prednisone dose. I will monitor BG trends and make further recommendations as needed.   Bg's are improved. I suspect the patent can keep BG's stable on a NPH insulin regimen. A higher dose in the morning to help override the steroid. The patient also reports confusion with the corrective scale. Since the patient reports eating well at all 3 meals at home I have ordered scheduled Humalog for each meal and will eliminate the corrective insulin. The patient also benefit from a CGM.     Management Rationale Action Plan   Medication   Basal needs Using 0.45x kg weight  Using 27 units Lantus in the morning and 12 units nightly   Nutritional needs Using medium sensitivity based on weight  Use 6 units Humalog with each meal consumed   Corrective insulin Using medium dose sensitivity based on weight    Overriding steroid effect Using 0.2 units/kg/D based on weight   Additional 18 units NPH with each 30 mg prednisone ( total 45 unit NPH given in the morning)    Additional orders          Diabetes Discharge Plan   Medication  TBD   Referral  [x]         Outpatient diabetes education   Additional orders            Initial Presentation   Siomara Collins is a 79 y.o. female admitted  after experiencing painful urination, bloating and frequency  LAB: Glucose 395. A1c 10.2%.        HX:   Past Medical History:   Diagnosis Date    Abnormal LFTs 08/24/2017    FATTY LIVER    Arthritis     OSTEO    Avascular necrosis of hip (HCC) 08/24/2017    BILAT HIPS    Breast CA ([facility]) 1989, 2001    BILATERAL; SURGERY, CHEMO    Chronic pain     CKD (chronic kidney disease), stage II 8/24/2017    Coagulation disorder ([facility]) 1984    ITP  (DR DC CAR) - PLATELETS DROPPED TO 5K    Depression     Diabetes ([facility]) 2012    TYPE 2; NIDDM    DJD (degenerative joint disease), multiple sites 8/24/2017    GI bleed 2008    HEMORRHOIDS    Hyperlipemia     Hypertension with renal disease 8/24/2017    IBS (irritable bowel syndrome) 8/24/2017    Ill-defined condition 2015    PNEUMONIA X2 - HOSPITALIZED IN 2015    Interstitial lung disease ([facility]) 04/01/2019    Liver disease     FATTY LIVER    Myalgia 8/24/2017    On statin therapy 8/24/2017    Overactive bladder 8/24/2017    Pneumonia 04/2015    HOSPITALIZED 3 WEEKS.    Polymyalgia rheumatica (HCC) 8/24/2017    Prophylactic antibiotic 8/24/2017    Reflex abnormality     acid reflex    Rosacea         INITIAL DX: Hyperglycemia [R73.9]  ESBL (extended spectrum beta-lactamase) producing bacteria infection [A49.9, Z16.12]  Chronic UTI (urinary tract infection) [N39.0]  Klebsiella cystitis [N30.90, B96.1]     Current Treatment     TX: Clot prevention. Steroid. Inuslin. Abx. Pain management. BP management. Urinary management.     Hospital Course   Clinical progress has been complicated by UTI and hyperglycemia.     Diabetes History   Hx of Type 2 diabetes. Hx of UTI's. Follows with PCP for diabetes management. Follows with an urologist. Resides at assisted living facility    Diabetes-related Medical History    Microvascular disease  Nephropathy    Other associated

## 2024-08-01 LAB
GLUCOSE BLD STRIP.AUTO-MCNC: 212 MG/DL (ref 65–117)
GLUCOSE BLD STRIP.AUTO-MCNC: 316 MG/DL (ref 65–117)
GLUCOSE BLD STRIP.AUTO-MCNC: 351 MG/DL (ref 65–117)
GLUCOSE BLD STRIP.AUTO-MCNC: 403 MG/DL (ref 65–117)
SERVICE CMNT-IMP: ABNORMAL

## 2024-08-01 PROCEDURE — 2580000003 HC RX 258: Performed by: STUDENT IN AN ORGANIZED HEALTH CARE EDUCATION/TRAINING PROGRAM

## 2024-08-01 PROCEDURE — 2500000003 HC RX 250 WO HCPCS: Performed by: NURSE PRACTITIONER

## 2024-08-01 PROCEDURE — 6360000002 HC RX W HCPCS: Performed by: INTERNAL MEDICINE

## 2024-08-01 PROCEDURE — 2580000003 HC RX 258: Performed by: INTERNAL MEDICINE

## 2024-08-01 PROCEDURE — 6370000000 HC RX 637 (ALT 250 FOR IP): Performed by: STUDENT IN AN ORGANIZED HEALTH CARE EDUCATION/TRAINING PROGRAM

## 2024-08-01 PROCEDURE — 1100000000 HC RM PRIVATE

## 2024-08-01 PROCEDURE — 82962 GLUCOSE BLOOD TEST: CPT

## 2024-08-01 PROCEDURE — 6370000000 HC RX 637 (ALT 250 FOR IP): Performed by: INTERNAL MEDICINE

## 2024-08-01 PROCEDURE — 6370000000 HC RX 637 (ALT 250 FOR IP)

## 2024-08-01 RX ADMIN — Medication 6 UNITS: at 13:24

## 2024-08-01 RX ADMIN — Medication: at 10:20

## 2024-08-01 RX ADMIN — PREDNISONE 30 MG: 5 TABLET ORAL at 10:05

## 2024-08-01 RX ADMIN — Medication 3 UNITS: at 13:23

## 2024-08-01 RX ADMIN — SODIUM CHLORIDE, PRESERVATIVE FREE 10 ML: 5 INJECTION INTRAVENOUS at 20:43

## 2024-08-01 RX ADMIN — SODIUM CHLORIDE, PRESERVATIVE FREE 10 ML: 5 INJECTION INTRAVENOUS at 10:07

## 2024-08-01 RX ADMIN — LISINOPRIL 10 MG: 5 TABLET ORAL at 10:06

## 2024-08-01 RX ADMIN — INSULIN LISPRO 4 UNITS: 100 INJECTION, SOLUTION INTRAVENOUS; SUBCUTANEOUS at 20:41

## 2024-08-01 RX ADMIN — MONTELUKAST 10 MG: 10 TABLET, FILM COATED ORAL at 20:37

## 2024-08-01 RX ADMIN — PANTOPRAZOLE SODIUM 40 MG: 40 TABLET, DELAYED RELEASE ORAL at 06:26

## 2024-08-01 RX ADMIN — HUMAN INSULIN 54 UNITS: 100 INJECTION, SUSPENSION SUBCUTANEOUS at 10:12

## 2024-08-01 RX ADMIN — Medication 1 UNITS: at 10:11

## 2024-08-01 RX ADMIN — Medication 4 UNITS: at 18:29

## 2024-08-01 RX ADMIN — ROSUVASTATIN CALCIUM 20 MG: 20 TABLET, FILM COATED ORAL at 20:37

## 2024-08-01 RX ADMIN — MEROPENEM 1000 MG: 1 INJECTION INTRAVENOUS at 10:26

## 2024-08-01 RX ADMIN — HYDROCHLOROTHIAZIDE 25 MG: 25 TABLET ORAL at 10:06

## 2024-08-01 RX ADMIN — MEROPENEM 1000 MG: 1 INJECTION INTRAVENOUS at 01:24

## 2024-08-01 RX ADMIN — MUPIROCIN: 20 OINTMENT TOPICAL at 20:43

## 2024-08-01 RX ADMIN — MEROPENEM 1000 MG: 1 INJECTION INTRAVENOUS at 18:31

## 2024-08-01 RX ADMIN — MUPIROCIN: 20 OINTMENT TOPICAL at 10:15

## 2024-08-01 RX ADMIN — GUAIFENESIN 200 MG: 200 SOLUTION ORAL at 20:37

## 2024-08-01 RX ADMIN — PRIMIDONE 50 MG: 50 TABLET ORAL at 10:06

## 2024-08-01 RX ADMIN — GABAPENTIN 100 MG: 100 CAPSULE ORAL at 20:37

## 2024-08-01 RX ADMIN — GUAIFENESIN 200 MG: 200 SOLUTION ORAL at 01:18

## 2024-08-01 RX ADMIN — INSULIN HUMAN 12 UNITS: 100 INJECTION, SUSPENSION SUBCUTANEOUS at 20:42

## 2024-08-01 RX ADMIN — GABAPENTIN 100 MG: 100 CAPSULE ORAL at 15:48

## 2024-08-01 RX ADMIN — Medication: at 20:43

## 2024-08-01 RX ADMIN — PRIMIDONE 50 MG: 50 TABLET ORAL at 20:37

## 2024-08-01 RX ADMIN — Medication 6 UNITS: at 10:10

## 2024-08-01 RX ADMIN — Medication 1000 UNITS: at 10:06

## 2024-08-01 RX ADMIN — Medication 6 UNITS: at 18:28

## 2024-08-01 RX ADMIN — GABAPENTIN 100 MG: 100 CAPSULE ORAL at 10:06

## 2024-08-01 RX ADMIN — DICLOFENAC SODIUM 2 G: 10 GEL TOPICAL at 10:14

## 2024-08-01 RX ADMIN — FUROSEMIDE 20 MG: 20 TABLET ORAL at 20:37

## 2024-08-01 RX ADMIN — SODIUM CHLORIDE: 9 INJECTION, SOLUTION INTRAVENOUS at 01:24

## 2024-08-01 RX ADMIN — FUROSEMIDE 20 MG: 20 TABLET ORAL at 10:06

## 2024-08-01 ASSESSMENT — PAIN SCALES - GENERAL
PAINLEVEL_OUTOF10: 5
PAINLEVEL_OUTOF10: 0

## 2024-08-01 NOTE — DIABETES MGMT
each meal consumed   Corrective insulin Using medium dose sensitivity based on weight    Overriding steroid effect Using 0.2 units/kg/D based on weight   Additional 18 units NPH with each 30 mg prednisone ( total 54 unit NPH given in the morning)    Additional orders          Diabetes Discharge Plan   Medication  TBD   Referral  [x]        Outpatient diabetes education   Additional orders            Initial Presentation   Siomara Collins is a 79 y.o. female admitted  after experiencing painful urination, bloating and frequency  LAB: Glucose 395. A1c 10.2%.        HX:   Past Medical History:   Diagnosis Date    Abnormal LFTs 08/24/2017    FATTY LIVER    Arthritis     OSTEO    Avascular necrosis of hip (HCC) 08/24/2017    BILAT HIPS    Breast CA (HCC) 1989, 2001    BILATERAL; SURGERY, CHEMO    Chronic pain     CKD (chronic kidney disease), stage II 8/24/2017    Coagulation disorder ([facility]) 1984    ITP  (DR DC CAR) - PLATELETS DROPPED TO 5K    Depression     Diabetes ([facility]) 2012    TYPE 2; NIDDM    DJD (degenerative joint disease), multiple sites 8/24/2017    GI bleed 2008    HEMORRHOIDS    Hyperlipemia     Hypertension with renal disease 8/24/2017    IBS (irritable bowel syndrome) 8/24/2017    Ill-defined condition 2015    PNEUMONIA X2 - HOSPITALIZED IN 2015    Interstitial lung disease (HCC) 04/01/2019    Liver disease     FATTY LIVER    Myalgia 8/24/2017    On statin therapy 8/24/2017    Overactive bladder 8/24/2017    Pneumonia 04/2015    HOSPITALIZED 3 WEEKS.    Polymyalgia rheumatica (HCC) 8/24/2017    Prophylactic antibiotic 8/24/2017    Reflex abnormality     acid reflex    Rosacea         INITIAL DX: Hyperglycemia [R73.9]  ESBL (extended spectrum beta-lactamase) producing bacteria infection [A49.9, Z16.12]  Chronic UTI (urinary tract infection) [N39.0]  Klebsiella cystitis [N30.90, B96.1]     Current Treatment     TX: Clot prevention. Steroid. Inuslin. Abx. Pain management. BP management. Urinary management.      Hospital Course   Clinical progress has been complicated by UTI and hyperglycemia.     Diabetes History   Hx of Type 2 diabetes. Hx of UTI's. Follows with PCP for diabetes management. Follows with an urologist. Resides at assisted living facility    Diabetes-related Medical History    Microvascular disease  Nephropathy    Other associated conditions     Interstitial lung disease- Chronic steroid use      Diabetes Medication History  Diabetes drug class Diabetes drug name Additional Comments   Insulin Lantus  humalog      Diabetes self-management practices:   Eating pattern Deferred   [] Eating a carbohydrate-controlled meal plan    Physical activity pattern    [x] Not employing a physical activity program to control BG    Monitoring pattern   [x] Testing BGs sufficiently to inform self-management adjustments    Taking medications pattern  [x] Consistent administration  [x] Affordable  Reducing risks     [x] Pneumonia: 07/27/2015      Social determinants of health impacting diabetes self-management practices    Concerned that you need to know more about how to stay healthy with diabetes    Overall evaluation:    [x]  Not achieving A1c target with drug therapy & self-care practices    Subjective   ”Do I need to follow-up with an endocrinologist\"      Objective   Physical exam  General Obese female in no acute distress. Conversant and cooperative  Neuro  Alert, oriented   Vital Signs   Vitals:    08/01/24 0901   BP: 101/80   Pulse: 97   Resp: 18   Temp:    SpO2: 98%         Laboratory  Recent Labs     07/30/24  2321   WBC 9.7   HGB 14.6   HCT 41.6   .0*          Recent Labs     07/30/24  2321   *   K 4.0   CL 98   CO2 29   BUN 47*   CREATININE 0.99       Lab Results   Component Value Date    ALT 63 07/30/2024    AST 38 (H) 07/30/2024    ALKPHOS 85 07/30/2024    BILITOT 0.6 07/30/2024     Lab Results   Component Value Date    TSH 0.23 (L) 05/13/2024     Lab Results   Component Value Date

## 2024-08-01 NOTE — PLAN OF CARE
Problem: Discharge Planning  Goal: Discharge to home or other facility with appropriate resources  Outcome: Progressing     Problem: Pain  Goal: Verbalizes/displays adequate comfort level or baseline comfort level  Outcome: Progressing     Problem: Skin/Tissue Integrity  Goal: Absence of new skin breakdown  Description: 1.  Monitor for areas of redness and/or skin breakdown  2.  Assess vascular access sites hourly  3.  Every 4-6 hours minimum:  Change oxygen saturation probe site  4.  Every 4-6 hours:  If on nasal continuous positive airway pressure, respiratory therapy assess nares and determine need for appliance change or resting period.  Outcome: Progressing     Problem: Safety - Adult  Goal: Free from fall injury  Outcome: Progressing     Problem: ABCDS Injury Assessment  Goal: Absence of physical injury  Outcome: Progressing     Problem: Neurosensory - Adult  Goal: Achieves stable or improved neurological status  Outcome: Progressing  Goal: Absence of seizures  Outcome: Progressing  Goal: Remains free of injury related to seizures activity  Outcome: Progressing     Problem: Cardiovascular - Adult  Goal: Maintains optimal cardiac output and hemodynamic stability  Outcome: Progressing     Problem: Skin/Tissue Integrity - Adult  Goal: Skin integrity remains intact  Outcome: Progressing  Flowsheets (Taken 7/31/2024 2043)  Skin Integrity Remains Intact: Monitor for areas of redness and/or skin breakdown     Problem: Musculoskeletal - Adult  Goal: Return mobility to safest level of function  Outcome: Progressing     Problem: Genitourinary - Adult  Goal: Absence of urinary retention  Outcome: Progressing

## 2024-08-01 NOTE — PROGRESS NOTES
End of Shift Note    Bedside shift change report given to Judie RN (oncoming nurse) by Roque Carrington LPN (offgoing nurse).  Report included the following information SBAR REPORTS LIST: SBAR    Shift worked:  7A-7P     Shift summary and any significant changes:     No changes      Concerns for physician to address:  NO concerns at this time      Zone phone for oncoming shift:   [phone]       Activity:  Activity: Out of bed with assistance  Number times ambulated in hallways past shift: 0  Number of times OOB to chair past shift: 0    Cardiac:   Cardiac Monitoring: YES / NO: No        Access:  Current line(s): IV ACCESS: - Peripheral IV - site  R Antecubital, insertion date: 7/26/24    Genitourinary:   Urinary status: Urinary status: VOIDS, EXTERNAL CATHETER    Respiratory:   oxygen delivery: room air  Chronic home O2 use?: YES / NO: No  Incentive spirometer at bedside: YES / NO: No      GI:  Current diet:  DIET: regular  Passing flatus: YES / NO: Yes  Tolerating current diet: YES / NO: Yes      Pain Management:   Patient states pain is manageable on current regimen: YES / NO: PT HAS NO PAIN    Skin:    Interventions: JEANNIE SCALE: 17    Patient Safety:  Fall Score: FALL RISK ASSESSMENT: At risk   Interventions: Fall Interventions Provided: Implemented/recommended increased supervision/assistance      Length of Stay:  Expected LOS: 6  Actual LOS: 6      Roque Carrington LPN

## 2024-08-01 NOTE — PROGRESS NOTES
Chart reviewed for PCP hospital follow up. Patient's current DOV is 8/1/24. Patient is currently recommended for SNF. Pending patient discharge.

## 2024-08-01 NOTE — PROGRESS NOTES
Hospitalist Progress Note    NAME:   Siomara Collins   : 1945   MRN: [id number]     Date/Time: 2024 4:29 PM  Patient PCP: Irvin Vang MD    Estimated discharge date:   Barriers: completion of iv antibiotics      Assessment / Plan:        Recurrent ESBL Klebsiella UTI  -Hospitalized  with Klebsiella ESBL UTI, received meropenem and was discharged with plan for 3 more days of meropenem/ertapenem at skilled nursing facility.  Patient states that this was given to her once she got to the facility  - urine culture continue to growing ESBL Klebsiella and patient was started on Ceftin by her PCP, noted to have a Levaquin allergy  -Continues to have significant dysuria and burning with urination which is bothersome to her  -States that she saw urogynecology recently who noted no indication for intervention and wanted her to see infectious disease  -Is immunosuppressed secondary to chronic steroid use  -Urine culture showed possible UTI  -Meropenem started empirically in the emergency department  - continue IV meropenem for one fore day, will give 1 dose of ertapenem before discharge to complete 7 day course.   -Urine culture growing Klebsiella pneumonia, MDRO  Urology signed off     Type 2 diabetes    Continue SSI  NPH increased to 54 units sc in the morning, and NPH 12 units sc nightly     History of interstitial lung disease  -Continue home prednisone 30 mg daily     Lower extremity edema  -Patient reports now is on Lasix 20 mg 2 times per day, will continue     Polymyalgia rheumatica  Dyslipidemia  Hypertension  Tremor  Diabetic neuropathy  -cont' home steroids as above.  Currently blood pressure is stable.    -Continue statin, Coreg and also gabapentin  -Continue home primidone        Medical Decision Making:   I personally reviewed labs:  I personally reviewed imaging: N/A  I personally reviewed EKG: N/A  Toxic drug monitoring:   Type II DM  -continue ISS  -monitor for hypoglycemia due

## 2024-08-02 VITALS
SYSTOLIC BLOOD PRESSURE: 99 MMHG | BODY MASS INDEX: 35.61 KG/M2 | HEART RATE: 75 BPM | HEIGHT: 63 IN | RESPIRATION RATE: 18 BRPM | OXYGEN SATURATION: 97 % | DIASTOLIC BLOOD PRESSURE: 68 MMHG | WEIGHT: 201 LBS | TEMPERATURE: 98 F

## 2024-08-02 LAB
BACTERIA SPEC CULT: NORMAL
BACTERIA SPEC CULT: NORMAL
GLUCOSE BLD STRIP.AUTO-MCNC: 133 MG/DL (ref 65–117)
GLUCOSE BLD STRIP.AUTO-MCNC: 318 MG/DL (ref 65–117)
SERVICE CMNT-IMP: ABNORMAL
SERVICE CMNT-IMP: ABNORMAL
SERVICE CMNT-IMP: NORMAL
SERVICE CMNT-IMP: NORMAL

## 2024-08-02 PROCEDURE — 6370000000 HC RX 637 (ALT 250 FOR IP)

## 2024-08-02 PROCEDURE — 6370000000 HC RX 637 (ALT 250 FOR IP): Performed by: STUDENT IN AN ORGANIZED HEALTH CARE EDUCATION/TRAINING PROGRAM

## 2024-08-02 PROCEDURE — 2580000003 HC RX 258: Performed by: INTERNAL MEDICINE

## 2024-08-02 PROCEDURE — 6360000002 HC RX W HCPCS: Performed by: INTERNAL MEDICINE

## 2024-08-02 PROCEDURE — 6370000000 HC RX 637 (ALT 250 FOR IP): Performed by: INTERNAL MEDICINE

## 2024-08-02 PROCEDURE — 2500000003 HC RX 250 WO HCPCS: Performed by: NURSE PRACTITIONER

## 2024-08-02 PROCEDURE — 82962 GLUCOSE BLOOD TEST: CPT

## 2024-08-02 RX ORDER — VIBEGRON 75 MG/1
75 TABLET, FILM COATED ORAL DAILY
Qty: 30 TABLET | Refills: 0 | Status: SHIPPED | OUTPATIENT
Start: 2024-08-02

## 2024-08-02 RX ORDER — LIDOCAINE 4 G/G
1 PATCH TOPICAL DAILY
Qty: 30 PATCH | Refills: 0 | Status: SHIPPED | OUTPATIENT
Start: 2024-08-02

## 2024-08-02 RX ORDER — LISINOPRIL 10 MG/1
10 TABLET ORAL DAILY
Qty: 30 TABLET | Refills: 0 | Status: SHIPPED | OUTPATIENT
Start: 2024-08-02

## 2024-08-02 RX ORDER — OMEPRAZOLE 20 MG/1
20 CAPSULE, DELAYED RELEASE ORAL DAILY
Qty: 30 CAPSULE | Refills: 0 | Status: SHIPPED | OUTPATIENT
Start: 2024-08-02 | End: 2024-09-01

## 2024-08-02 RX ORDER — PRIMIDONE 50 MG/1
50 TABLET ORAL 2 TIMES DAILY
Qty: 60 TABLET | Refills: 0 | Status: SHIPPED | OUTPATIENT
Start: 2024-08-02

## 2024-08-02 RX ORDER — CHOLECALCIFEROL (VITAMIN D3) 25 MCG
1000 TABLET ORAL DAILY
Qty: 30 TABLET | Refills: 0 | Status: SHIPPED | OUTPATIENT
Start: 2024-08-02

## 2024-08-02 RX ORDER — HYDROCHLOROTHIAZIDE 25 MG/1
25 TABLET ORAL DAILY
Qty: 30 TABLET | Refills: 0 | Status: SHIPPED | OUTPATIENT
Start: 2024-08-02

## 2024-08-02 RX ORDER — INSULIN LISPRO 100 [IU]/ML
8 INJECTION, SOLUTION INTRAVENOUS; SUBCUTANEOUS
Qty: 5 ADJUSTABLE DOSE PRE-FILLED PEN SYRINGE | Refills: 2 | Status: SHIPPED | OUTPATIENT
Start: 2024-08-02

## 2024-08-02 RX ORDER — ACETAMINOPHEN 325 MG/1
650 TABLET ORAL EVERY 6 HOURS PRN
Qty: 120 TABLET | Refills: 0 | Status: SHIPPED | OUTPATIENT
Start: 2024-08-02

## 2024-08-02 RX ORDER — GLUCOSAMINE HCL/CHONDROITIN SU 500-400 MG
1 CAPSULE ORAL 4 TIMES DAILY
Qty: 360 STRIP | Refills: 0 | Status: SHIPPED | OUTPATIENT
Start: 2024-08-02 | End: 2024-10-31

## 2024-08-02 RX ORDER — FUROSEMIDE 20 MG/1
20 TABLET ORAL 2 TIMES DAILY
Qty: 60 TABLET | Refills: 0 | Status: SHIPPED | OUTPATIENT
Start: 2024-08-02

## 2024-08-02 RX ORDER — BLOOD-GLUCOSE SENSOR
1 EACH MISCELLANEOUS
Qty: 2 EACH | Refills: 3 | Status: SHIPPED | OUTPATIENT
Start: 2024-08-02

## 2024-08-02 RX ORDER — GABAPENTIN 100 MG/1
CAPSULE ORAL
Qty: 50 CAPSULE | Refills: 0 | Status: SHIPPED | OUTPATIENT
Start: 2024-08-02 | End: 2024-09-08

## 2024-08-02 RX ORDER — ROSUVASTATIN CALCIUM 20 MG/1
20 TABLET, COATED ORAL NIGHTLY
Qty: 30 TABLET | Refills: 0 | Status: SHIPPED | OUTPATIENT
Start: 2024-08-02

## 2024-08-02 RX ORDER — PREDNISONE 10 MG/1
TABLET ORAL
Qty: 90 TABLET | Refills: 0 | Status: SHIPPED | OUTPATIENT
Start: 2024-08-02

## 2024-08-02 RX ORDER — MONTELUKAST SODIUM 10 MG/1
10 TABLET ORAL NIGHTLY
Qty: 30 TABLET | Refills: 0 | Status: SHIPPED | OUTPATIENT
Start: 2024-08-02

## 2024-08-02 RX ORDER — BLOOD-GLUCOSE METER
1 KIT MISCELLANEOUS DAILY
Qty: 1 KIT | Refills: 0 | Status: SHIPPED | OUTPATIENT
Start: 2024-08-02

## 2024-08-02 RX ORDER — LANOLIN AND PETROLATUM 136.4; 469.9 MG/G; MG/G
OINTMENT TOPICAL 2 TIMES DAILY PRN
Qty: 454 G | Refills: 0 | Status: SHIPPED | OUTPATIENT
Start: 2024-08-02

## 2024-08-02 RX ORDER — INSULIN HUMAN 100 [IU]/ML
12 INJECTION, SUSPENSION SUBCUTANEOUS NIGHTLY
Qty: 5 ADJUSTABLE DOSE PRE-FILLED PEN SYRINGE | Refills: 2 | Status: SHIPPED | OUTPATIENT
Start: 2024-08-02

## 2024-08-02 RX ADMIN — PRIMIDONE 50 MG: 50 TABLET ORAL at 09:03

## 2024-08-02 RX ADMIN — PREDNISONE 30 MG: 5 TABLET ORAL at 09:02

## 2024-08-02 RX ADMIN — GUAIFENESIN 200 MG: 200 SOLUTION ORAL at 06:27

## 2024-08-02 RX ADMIN — GABAPENTIN 100 MG: 100 CAPSULE ORAL at 09:04

## 2024-08-02 RX ADMIN — FUROSEMIDE 20 MG: 20 TABLET ORAL at 09:03

## 2024-08-02 RX ADMIN — Medication 1000 UNITS: at 09:03

## 2024-08-02 RX ADMIN — Medication: at 09:05

## 2024-08-02 RX ADMIN — HYDROCHLOROTHIAZIDE 25 MG: 25 TABLET ORAL at 09:02

## 2024-08-02 RX ADMIN — Medication 6 UNITS: at 09:04

## 2024-08-02 RX ADMIN — PANTOPRAZOLE SODIUM 40 MG: 40 TABLET, DELAYED RELEASE ORAL at 06:27

## 2024-08-02 RX ADMIN — HUMAN INSULIN 54 UNITS: 100 INJECTION, SUSPENSION SUBCUTANEOUS at 09:04

## 2024-08-02 RX ADMIN — Medication 6 UNITS: at 12:23

## 2024-08-02 RX ADMIN — MUPIROCIN: 20 OINTMENT TOPICAL at 09:05

## 2024-08-02 RX ADMIN — MEROPENEM 1000 MG: 1 INJECTION INTRAVENOUS at 01:39

## 2024-08-02 RX ADMIN — ERTAPENEM SODIUM 1000 MG: 1 INJECTION INTRAMUSCULAR; INTRAVENOUS at 10:05

## 2024-08-02 RX ADMIN — Medication 3 UNITS: at 12:23

## 2024-08-02 ASSESSMENT — PAIN SCALES - GENERAL
PAINLEVEL_OUTOF10: 0
PAINLEVEL_OUTOF10: 0

## 2024-08-02 NOTE — DIABETES MGMT
[facility]ALTH  DIABETES MANAGEMENT CONSULT    Consulted by Provider for advanced nursing evaluation and care for inpatient blood glucose management.    Evaluation and Action Plan   Siomara Collins is a 79 year old female with a hx of uncontrolled Type 2 diabetes. The patient reportedly takes Lantus 50 units in the morning and 15 units nightly. She also takes corrective scale Humalog. It is noted that she is taking long term prednisone for a chronic lung problem. I suspect the steroid is playing a role in persistent hyperglycemia.  The patient is admitted after experience painful urination, bloating and frequency. The patient reports several admissions related to UTI since January 2024. Diabetes management has been consulted to assist with inpatient hospital blood glucose control and evaluation of home medication regimen. I have ordered NPH to linked with prednisone dose. I will monitor BG trends and make further recommendations as needed.   Bg's are improved. I suspect the patent can keep BG's stable on a NPH insulin regimen. A higher dose in the morning to help override the steroid. The patient also reports confusion with the corrective scale. Since the patient reports eating well at all 3 meals at home I have ordered scheduled Humalog for each meal and will eliminate the corrective insulin. The patient also benefit from a CGM.   The patient's Bg's are improving. Am adjustment to the insulin dosing made. Lantus d/c'd and using NPH only for basal insulin.  The discharge regimen  has been discussed with the patient and she is amenable.  Scheduled Humalog continues. CGM placed on patient.    this morning on the current regimen. In anticipation of discharge I recommend continuing the current inpatient basal insulin dosing. I have discussed this with the patient and she is amenable.           Diabetes Discharge Plan   Medication  Use 54 units NPH every morning ( take at same time as

## 2024-08-02 NOTE — CARE COORDINATION
Cleared for D/C from CM standpoint    Transition of Care Plan:     RUR: 27%  Prior Level of Functioning: needs assistance   Disposition: return to [facility] ([facility])  If SNF or IPR: Date FOC offered: N/A thus far  Follow up appointments: PCP, specialists as indicated   DME needed: N/A  Transportation at discharge: wheelchair van, ETA 4PM  IM/IMM Medicare/ letter given: given   Is patient a [place] and connected with [facility]? no              If yes, was  transfer form completed and [facility] notified?   Caregiver Contact: Jimi Verdin (son) [phone]  Discharge Caregiver contacted prior to discharge? Pt contacted   Care Conference needed? no  Barriers to discharge:  none    CM aware of discharge order. Pt does not require SNF placement as she will discharge on PO ABX. 2nd IM given. Pt has secured own ride home at 4PM today- has personal wheelchair at the bedside. CM faxed d/c summary to [facility] [phone]. No further CM needs identified.       08/02/24 1532   Services At/After Discharge   Transition of Care Consult (CM Consult) Discharge Planning   Services At/After Discharge Assisted living  (return to [facility])   [facility] Resource Information Provided? No   Mode of Transport at Discharge WC Van  (pt arranged own ride)   Hospital Transport Time of Discharge 1600   Confirm Follow Up Transport Wheelchair Van   Condition of Participation: Discharge Planning   The Plan for Transition of Care is related to the following treatment goals: returning to [facility]   The Patient and/or Patient Representative was provided with a Choice of Provider? Patient   The Patient and/Or Patient Representative agree with the Discharge Plan? Yes   Freedom of Choice list was provided with basic dialogue that supports the patient's individualized plan of care/goals, treatment preferences, and shares the quality data associated with the providers?  No  (no services indicated)       RACHEL Goddard  Manager, [facility]  [phone]

## 2024-08-02 NOTE — PROGRESS NOTES
Dc paperwork reviewed with patient. Verbalized understanding with no further questions. Awaiting ride home

## 2024-08-02 NOTE — DISCHARGE SUMMARY
Discharge Summary    Name: Siomara Collins  [id number]  YOB: 1945 (Age: 79 y.o.)   Date of Admission: 7/26/2024  Date of Discharge: 8/2/2024  Attending Physician: Anitha Corrales MD    Discharge Diagnosis:   Recurrent ESBL Klebsiella UTI  Type II DM  Interstitial disease  Polymyalgia rheumatica  Hyperlipidemia  Hypertension  Tremor  Diabetic neuropathy      Consultations:  IP CONSULT TO INFECTIOUS DISEASES  IP CONSULT TO UROLOGY  IP CONSULT TO DIABETES MANAGEMENT      Brief Admission History/Reason for Admission Per Oneal Keller MD:   Patient reports that she has had a long history of refractory UTIs which grew resistant organisms. States that she was recently hospitalized at the beginning of this month for urinary tract infection and was discharged to a skilled nursing facility. States that she was was to be discharged home on IV antibiotics however these were changed as soon as she got to the nursing facility and does not think that she received a full course of antibiotics. She is continue to have persistent dysuria and discomfort when urinating. Has been following with her primary care provider who recently called in a prescription for Ceftin. Also states that she saw urology recently as an outpatient and states that they felt like they did not have a good intervention for her and wanted her to see an infectious disease physician.     Brief Hospital Course by Main Problems:     Recurrent ESBL Klebsiella UTI  -Hospitalized 7/8 with Klebsiella ESBL UTI, received meropenem and was discharged with plan for 3 more days of meropenem/ertapenem at skilled nursing facility.  Patient states that this was given to her once she got to the facility  -7/16 urine culture continue to growing ESBL Klebsiella and patient was started on Ceftin by her PCP, noted to have a Levaquin allergy  -Continues to have significant dysuria and burning with urination which is bothersome to

## 2024-08-02 NOTE — PLAN OF CARE
Problem: Discharge Planning  Goal: Discharge to home or other facility with appropriate resources  Outcome: Adequate for Discharge     Problem: Pain  Goal: Verbalizes/displays adequate comfort level or baseline comfort level  Outcome: Adequate for Discharge     Problem: Skin/Tissue Integrity  Goal: Absence of new skin breakdown  Description: 1.  Monitor for areas of redness and/or skin breakdown  2.  Assess vascular access sites hourly  3.  Every 4-6 hours minimum:  Change oxygen saturation probe site  4.  Every 4-6 hours:  If on nasal continuous positive airway pressure, respiratory therapy assess nares and determine need for appliance change or resting period.  Outcome: Adequate for Discharge     Problem: Safety - Adult  Goal: Free from fall injury  Outcome: Adequate for Discharge     Problem: Neurosensory - Adult  Goal: Achieves stable or improved neurological status  Outcome: Adequate for Discharge  Goal: Absence of seizures  Outcome: Adequate for Discharge  Goal: Remains free of injury related to seizures activity  Outcome: Adequate for Discharge     Problem: Cardiovascular - Adult  Goal: Maintains optimal cardiac output and hemodynamic stability  Outcome: Adequate for Discharge     Problem: Skin/Tissue Integrity - Adult  Goal: Skin integrity remains intact  Outcome: Adequate for Discharge     Problem: Musculoskeletal - Adult  Goal: Return mobility to safest level of function  Outcome: Adequate for Discharge     Problem: Genitourinary - Adult  Goal: Absence of urinary retention  Outcome: Adequate for Discharge     Problem: ABCDS Injury Assessment  Goal: Absence of physical injury  Outcome: /[facility] Progressing

## 2024-08-03 NOTE — RESULT ENCOUNTER NOTE
Patient been admitted from 7/26/2024 through 8/2/2024.  Patient was admitted for chronic UTI, providers aware of Klebsiella colonization.

## 2024-08-06 ENCOUNTER — TELEPHONE (OUTPATIENT)
Age: 79
End: 2024-08-06

## 2024-08-06 NOTE — TELEPHONE ENCOUNTER
Verified patient identity with two identifiers. Spoke with patient by phone.  Full med reconciliation completed.  Fasting BS this morning = 400, patient reports taking 50 units of Lantus and 12 Humalog at 0830 am, at 1145 am  per CGM.  CGM works intermittently due to poor internet coverage, patient states she needs new rx for meter, strips, and lancets sent to pharmacy so she can finger prick test as needed.   Patient has not picked up new NPH insulin yet from pharmacy, once she has new medications she will need guidance on transitioning from current Lantus to NPH  Patient taking Humalog with sliding scale before meals, has not transitioned to new rx of Humalog 8 units prior to meals.   Patient takes Prednisone 30mg daily  Patient could benefit from face to face meeting with PharmD for medication adherence, understanding, and directions.       Thank you,    Heena Rangel RN   Care Transition Nurse  [phone]

## 2024-08-06 NOTE — TELEPHONE ENCOUNTER
Pharmacy Progress Note - Medication Access    Consulted by Heena Rangel RN [facility] regarding Ms. Siomara Collins 79 y.o. 's medication access.     Contacted [facility] pharmacy regarding Ms. Siomara Collins 79 y.o. 's insulin access.  Recently discharged, patient has not been able to  insulin prescriptions ordered.    Three scripts:   Insulin NPH - 54 units in the AM  Insulin NPH - 12 units in the PM  Insulin Lispro - 8 units before meals TID       Per [facility] pharmacist:  - Humalog (Lispro) is not covered. Novolog is preferred.  This change was made and insulin can now be filled.   - NPH - Novolin is preferred brand. Unfortunately the Novolin NPH detail did not carry over in the EMR processing so pharmacy could not change brand.  Problem now resolved.     The following pharmacies have NPH in stock at this time:  [facility] -- [address] [facility] ([phone])   [facility] --- [address] [facility] ([phone])       Thank you for the consult,  Kathleen Fitch, PharmD, BCACP, MIKE          For Pharmacy Admin Tracking Only    Program: Medical Group  CPA in place:  Yes  Recommendation Provided To: Provider: 3 via Note to Provider, Patient/Caregiver: 3 via Telephone, and Pharmacy: 3  Intervention Detail: Benefit Assistance, Patient Access Assistance/Sample Provided, and ROLO: Level 1 Intervention Present?: Yes - 2 - hyperglycemia insulin access  Intervention Accepted By: Provider: 3, Patient/Caregiver: 3, and Pharmacy: 3  Gap Closed?: Yes   Time Spent (min): 20

## 2024-08-07 ENCOUNTER — OFFICE VISIT (OUTPATIENT)
Facility: CLINIC | Age: 79
End: 2024-08-07

## 2024-08-07 VITALS
HEIGHT: 63 IN | TEMPERATURE: 97.4 F | BODY MASS INDEX: 36.54 KG/M2 | SYSTOLIC BLOOD PRESSURE: 132 MMHG | OXYGEN SATURATION: 96 % | HEART RATE: 72 BPM | RESPIRATION RATE: 18 BRPM | WEIGHT: 206.2 LBS | DIASTOLIC BLOOD PRESSURE: 81 MMHG

## 2024-08-07 DIAGNOSIS — J84.9 INTERSTITIAL LUNG DISEASE (HCC): ICD-10-CM

## 2024-08-07 DIAGNOSIS — G62.9 NEUROPATHY: ICD-10-CM

## 2024-08-07 DIAGNOSIS — B96.89 URINARY TRACT INFECTION DUE TO ESBL KLEBSIELLA: ICD-10-CM

## 2024-08-07 DIAGNOSIS — I12.9 HYPERTENSION WITH RENAL DISEASE: ICD-10-CM

## 2024-08-07 DIAGNOSIS — Z76.89 ENCOUNTER FOR SUPPORT AND COORDINATION OF TRANSITION OF CARE: Primary | ICD-10-CM

## 2024-08-07 DIAGNOSIS — E11.65 UNCONTROLLED TYPE 2 DIABETES MELLITUS WITH HYPERGLYCEMIA (HCC): ICD-10-CM

## 2024-08-07 DIAGNOSIS — N39.0 URINARY TRACT INFECTION DUE TO ESBL KLEBSIELLA: ICD-10-CM

## 2024-08-07 DIAGNOSIS — N39.0 RECURRENT UTI: ICD-10-CM

## 2024-08-07 RX ORDER — BLOOD-GLUCOSE METER
1 KIT MISCELLANEOUS DAILY
Qty: 1 KIT | Refills: 0 | Status: SHIPPED | OUTPATIENT
Start: 2024-08-07

## 2024-08-07 RX ORDER — GLUCOSAMINE HCL/CHONDROITIN SU 500-400 MG
CAPSULE ORAL
Qty: 400 STRIP | Refills: 0 | Status: SHIPPED | OUTPATIENT
Start: 2024-08-07

## 2024-08-07 RX ORDER — INSULIN HUMAN 100 [IU]/ML
12 INJECTION, SUSPENSION SUBCUTANEOUS NIGHTLY
Qty: 5 ADJUSTABLE DOSE PRE-FILLED PEN SYRINGE | Refills: 2 | Status: SHIPPED | OUTPATIENT
Start: 2024-08-07

## 2024-08-07 RX ORDER — LANCETS 30 GAUGE
1 EACH MISCELLANEOUS 2 TIMES DAILY
Qty: 300 EACH | Refills: 1 | Status: SHIPPED | OUTPATIENT
Start: 2024-08-07

## 2024-08-07 RX ORDER — INSULIN LISPRO 100 [IU]/ML
8 INJECTION, SOLUTION INTRAVENOUS; SUBCUTANEOUS
Qty: 5 ADJUSTABLE DOSE PRE-FILLED PEN SYRINGE | Refills: 2 | Status: SHIPPED | OUTPATIENT
Start: 2024-08-07

## 2024-08-07 NOTE — PROGRESS NOTES
Siomara Collins is a 79 y.o. female     Chief Complaint   Patient presents with    [facility] D/C 8/2/24; BS ELEVATED (350)       /81 (Site: Left Upper Arm, Position: Sitting, Cuff Size: Medium Adult)   Pulse 72   Temp 97.4 °F (36.3 °C) (Oral)   Resp 18   Ht 1.6 m (5' 3\")   Wt 93.5 kg (206 lb 3.2 oz)   SpO2 96%   BMI 36.53 kg/m²     Health Maintenance Due   Topic Date Due    COVID-19 Vaccine (1) Never done    DEXA (modify frequency per FRAX score)  Never done    Respiratory Syncytial Virus (RSV) Pregnant or age 60 yrs+ (1 - 1-dose 60+ series) Never done    Shingles vaccine (2 of 3) 02/26/2017    Flu vaccine (1) 08/01/2024         \"Have you been to the ER, urgent care clinic since your last visit?  Hospitalized since your last visit?\"    7/26/24; [facility]        “Have you seen or consulted any other health care providers outside of [facility] since your last visit?”    NO

## 2024-08-12 DIAGNOSIS — E11.65 UNCONTROLLED TYPE 2 DIABETES MELLITUS WITH HYPERGLYCEMIA (HCC): ICD-10-CM

## 2024-08-12 NOTE — TELEPHONE ENCOUNTER
PCP: Irvin Vang MD    Last appt: 8/7/2024    Future Appointments   Date Time Provider Department Center   8/14/2024 10:10 AM Irvin Vang MD [facility]   8/16/2024 10:00 AM [facility] ECHO ROOM [facility]   9/4/2024 10:15 AM Joanne Frost, RN [facility] AMB   4/8/2025  2:30 PM Frank Colon MD [facility] [facility] AM[facility]       Requested Prescriptions     Pending Prescriptions Disp Refills    insulin lispro, 1 Unit Dial, (ADMELOG SOLOSTAR) 100 UNIT/ML SOPN [Pharmacy Med Name: ADMELOG SOLOSTAR 100 UNIT/ML]  0

## 2024-08-13 RX ORDER — INSULIN LISPRO 100 [IU]/ML
INJECTION, SOLUTION INTRAVENOUS; SUBCUTANEOUS
Refills: 0 | OUTPATIENT
Start: 2024-08-13

## 2024-08-15 ENCOUNTER — TELEPHONE (OUTPATIENT)
Facility: CLINIC | Age: 79
End: 2024-08-15

## 2024-08-15 NOTE — PROGRESS NOTES
Siomara Collins is a 79 y.o. female and presents with [facility] D/C 8/2/24; BS ELEVATED (350)  .    Subjective:  Ms. Collins presents today for transition of care follow-up as follows:    Date of Admission: 7/26/2024  Date of Discharge: 8/2/2024  Attending Physician: Anitha Corrales MD     Discharge Diagnosis:   Recurrent ESBL Klebsiella UTI  Type II DM  Interstitial disease  Polymyalgia rheumatica  Hyperlipidemia  Hypertension  Tremor  Diabetic neuropathy        Consultations:  IP CONSULT TO INFECTIOUS DISEASES  IP CONSULT TO UROLOGY  IP CONSULT TO DIABETES MANAGEMENT        Brief Admission History/Reason for Admission Per Oneal Keller MD:   Patient reports that she has had a long history of refractory UTIs which grew resistant organisms. States that she was recently hospitalized at the beginning of this month for urinary tract infection and was discharged to a skilled nursing facility. States that she was was to be discharged home on IV antibiotics however these were changed as soon as she got to the nursing facility and does not think that she received a full course of antibiotics. She is continue to have persistent dysuria and discomfort when urinating. Has been following with her primary care provider who recently called in a prescription for Ceftin. Also states that she saw urology recently as an outpatient and states that they felt like they did not have a good intervention for her and wanted her to see an infectious disease physician.      Brief Hospital Course by Main Problems:     Recurrent ESBL Klebsiella UTI  -Hospitalized 7/8 with Klebsiella ESBL UTI, received meropenem and was discharged with plan for 3 more days of meropenem/ertapenem at skilled nursing facility.  Patient states that this was given to her once she got to the facility  -7/16 urine culture continue to growing ESBL Klebsiella and patient was started on Ceftin by her PCP, noted to have a Levaquin allergy  -Continues to have

## 2024-08-15 NOTE — TELEPHONE ENCOUNTER
Patient called and states Januvia 100 mg tab is on her med list. She states it needs to be called in. I was unable to find it in her medication list. She states Dr Vang did not prescribe it. She was thinking maybe when she was in the hospital that they prescribed it. Please advise. [facility]     [phone]

## 2024-08-16 ENCOUNTER — TELEPHONE (OUTPATIENT)
Age: 79
End: 2024-08-16

## 2024-08-16 ENCOUNTER — HOSPITAL ENCOUNTER (OUTPATIENT)
Facility: HOSPITAL | Age: 79
End: 2024-08-16
Payer: MEDICARE

## 2024-08-16 VITALS — HEIGHT: 63 IN | WEIGHT: 206 LBS | BODY MASS INDEX: 36.5 KG/M2

## 2024-08-16 DIAGNOSIS — R01.1 HEART MURMUR: ICD-10-CM

## 2024-08-16 LAB
ECHO AO ASC DIAM: 3.7 CM
ECHO AO ASCENDING AORTA INDEX: 1.89 CM/M2
ECHO AO ROOT DIAM: 3.4 CM
ECHO AO ROOT INDEX: 1.73 CM/M2
ECHO AV AREA PEAK VELOCITY: 1.2 CM2
ECHO AV AREA VTI: 1.4 CM2
ECHO AV AREA/BSA PEAK VELOCITY: 0.6 CM2/M2
ECHO AV AREA/BSA VTI: 0.7 CM2/M2
ECHO AV MEAN GRADIENT: 13 MMHG
ECHO AV MEAN VELOCITY: 1.7 M/S
ECHO AV PEAK GRADIENT: 20 MMHG
ECHO AV PEAK VELOCITY: 2.2 M/S
ECHO AV VELOCITY RATIO: 0.41
ECHO AV VTI: 50.1 CM
ECHO BSA: 2.04 M2
ECHO LA DIAMETER INDEX: 1.48 CM/M2
ECHO LA DIAMETER: 2.9 CM
ECHO LA TO AORTIC ROOT RATIO: 0.85
ECHO LA VOL A-L A2C: 49 ML (ref 22–52)
ECHO LA VOL A-L A4C: 73 ML (ref 22–52)
ECHO LA VOL MOD A2C: 47 ML (ref 22–52)
ECHO LA VOL MOD A4C: 70 ML (ref 22–52)
ECHO LA VOLUME AREA LENGTH: 63 ML
ECHO LA VOLUME INDEX A-L A2C: 25 ML/M2 (ref 16–34)
ECHO LA VOLUME INDEX A-L A4C: 37 ML/M2 (ref 16–34)
ECHO LA VOLUME INDEX AREA LENGTH: 32 ML/M2 (ref 16–34)
ECHO LA VOLUME INDEX MOD A2C: 24 ML/M2 (ref 16–34)
ECHO LA VOLUME INDEX MOD A4C: 36 ML/M2 (ref 16–34)
ECHO LV E' LATERAL VELOCITY: 6 CM/S
ECHO LV E' SEPTAL VELOCITY: 5 CM/S
ECHO LV FRACTIONAL SHORTENING: 30 % (ref 28–44)
ECHO LV INTERNAL DIMENSION DIASTOLE INDEX: 2.19 CM/M2
ECHO LV INTERNAL DIMENSION DIASTOLIC: 4.3 CM (ref 3.9–5.3)
ECHO LV INTERNAL DIMENSION SYSTOLIC INDEX: 1.53 CM/M2
ECHO LV INTERNAL DIMENSION SYSTOLIC: 3 CM
ECHO LV IVSD: 1.2 CM (ref 0.6–0.9)
ECHO LV MASS 2D: 173.6 G (ref 67–162)
ECHO LV MASS INDEX 2D: 88.6 G/M2 (ref 43–95)
ECHO LV POSTERIOR WALL DIASTOLIC: 1.1 CM (ref 0.6–0.9)
ECHO LV RELATIVE WALL THICKNESS RATIO: 0.51
ECHO LVOT AREA: 3.1 CM2
ECHO LVOT AV VTI INDEX: 0.44
ECHO LVOT DIAM: 2 CM
ECHO LVOT MEAN GRADIENT: 1 MMHG
ECHO LVOT PEAK GRADIENT: 3 MMHG
ECHO LVOT PEAK VELOCITY: 0.9 M/S
ECHO LVOT STROKE VOLUME INDEX: 35.2 ML/M2
ECHO LVOT SV: 69.1 ML
ECHO LVOT VTI: 22 CM
ECHO MV A VELOCITY: 0.87 M/S
ECHO MV E DECELERATION TIME (DT): 187 MS
ECHO MV E VELOCITY: 0.77 M/S
ECHO MV E/A RATIO: 0.89
ECHO MV E/E' LATERAL: 12.83
ECHO MV E/E' RATIO (AVERAGED): 14.12
ECHO MV E/E' SEPTAL: 15.4
ECHO RA VOLUME: 38 ML
ECHO RA VOLUME: 40 ML
ECHO RV TAPSE: 2.1 CM (ref 1.7–?)
ECHO TV REGURGITANT MAX VELOCITY: 1.96 M/S
ECHO TV REGURGITANT PEAK GRADIENT: 15 MMHG

## 2024-08-16 PROCEDURE — 93306 TTE W/DOPPLER COMPLETE: CPT

## 2024-08-16 NOTE — TELEPHONE ENCOUNTER
Selina from Dr Horowitz's office at [facility] stating pt has an appt scheduled for April 2025 but would like to know if someone from the office could reach out to pt with a sooner appt. She stated at the visit yesterday pt seemed a little confused about what medications she is currently taking. [facility] for Selina stating pt has not been seen since 10/2022 and the request will be forwarded to Dr Colon.

## 2024-08-16 NOTE — TELEPHONE ENCOUNTER
Please call patient or her daughter and see if she can come on Thursday 8/29/24 at 10:10am for a sooner appointment.

## 2024-08-16 NOTE — TELEPHONE ENCOUNTER
Pt confirmed appt date and time and asked that information be sent via [employer] message as well. Message sent via [employer].

## 2024-08-16 NOTE — TELEPHONE ENCOUNTER
Reviewed her medication list and patient's history as well as notes from the hospital.  Do not see any mention of the Januvia.  Patient has appointment with Heena Patino on 8- and left message on her cell phone to bring her medications with her for review.

## 2024-08-19 ENCOUNTER — OFFICE VISIT (OUTPATIENT)
Facility: CLINIC | Age: 79
End: 2024-08-19
Payer: MEDICARE

## 2024-08-19 ENCOUNTER — TELEPHONE (OUTPATIENT)
Facility: CLINIC | Age: 79
End: 2024-08-19

## 2024-08-19 VITALS
HEIGHT: 63 IN | TEMPERATURE: 98.8 F | HEART RATE: 76 BPM | DIASTOLIC BLOOD PRESSURE: 82 MMHG | OXYGEN SATURATION: 94 % | SYSTOLIC BLOOD PRESSURE: 133 MMHG | RESPIRATION RATE: 17 BRPM | BODY MASS INDEX: 36.5 KG/M2

## 2024-08-19 DIAGNOSIS — M48.061 SPINAL STENOSIS OF LUMBAR REGION, UNSPECIFIED WHETHER NEUROGENIC CLAUDICATION PRESENT: ICD-10-CM

## 2024-08-19 DIAGNOSIS — Z74.09 NEED FOR ASSISTANCE DUE TO REDUCED MOBILITY: ICD-10-CM

## 2024-08-19 DIAGNOSIS — R29.898 PROXIMAL LEG WEAKNESS: ICD-10-CM

## 2024-08-19 DIAGNOSIS — J84.9 INTERSTITIAL LUNG DISEASE (HCC): ICD-10-CM

## 2024-08-19 DIAGNOSIS — R26.81 GAIT INSTABILITY: ICD-10-CM

## 2024-08-19 DIAGNOSIS — I35.0 NONRHEUMATIC AORTIC VALVE STENOSIS: ICD-10-CM

## 2024-08-19 DIAGNOSIS — Z79.52 LONG TERM (CURRENT) USE OF SYSTEMIC STEROIDS: ICD-10-CM

## 2024-08-19 DIAGNOSIS — R60.9 EDEMA, UNSPECIFIED TYPE: Primary | ICD-10-CM

## 2024-08-19 DIAGNOSIS — N39.41 URGE INCONTINENCE: ICD-10-CM

## 2024-08-19 DIAGNOSIS — M19.011 PRIMARY OSTEOARTHRITIS OF RIGHT SHOULDER: ICD-10-CM

## 2024-08-19 DIAGNOSIS — G62.9 NEUROPATHY: ICD-10-CM

## 2024-08-19 DIAGNOSIS — M35.3 POLYMYALGIA RHEUMATICA (HCC): ICD-10-CM

## 2024-08-19 DIAGNOSIS — R53.81 PHYSICAL DEBILITY: ICD-10-CM

## 2024-08-19 DIAGNOSIS — E11.65 UNCONTROLLED TYPE 2 DIABETES MELLITUS WITH HYPERGLYCEMIA (HCC): ICD-10-CM

## 2024-08-19 DIAGNOSIS — N39.0 RECURRENT UTI: ICD-10-CM

## 2024-08-19 PROCEDURE — 1123F ACP DISCUSS/DSCN MKR DOCD: CPT | Performed by: PHYSICIAN ASSISTANT

## 2024-08-19 PROCEDURE — G8400 PT W/DXA NO RESULTS DOC: HCPCS | Performed by: PHYSICIAN ASSISTANT

## 2024-08-19 PROCEDURE — 3046F HEMOGLOBIN A1C LEVEL >9.0%: CPT | Performed by: PHYSICIAN ASSISTANT

## 2024-08-19 PROCEDURE — 1036F TOBACCO NON-USER: CPT | Performed by: PHYSICIAN ASSISTANT

## 2024-08-19 PROCEDURE — G8427 DOCREV CUR MEDS BY ELIG CLIN: HCPCS | Performed by: PHYSICIAN ASSISTANT

## 2024-08-19 PROCEDURE — 1111F DSCHRG MED/CURRENT MED MERGE: CPT | Performed by: PHYSICIAN ASSISTANT

## 2024-08-19 PROCEDURE — 99215 OFFICE O/P EST HI 40 MIN: CPT | Performed by: PHYSICIAN ASSISTANT

## 2024-08-19 PROCEDURE — G8417 CALC BMI ABV UP PARAM F/U: HCPCS | Performed by: PHYSICIAN ASSISTANT

## 2024-08-19 PROCEDURE — 1090F PRES/ABSN URINE INCON ASSESS: CPT | Performed by: PHYSICIAN ASSISTANT

## 2024-08-19 PROCEDURE — 0509F URINE INCON PLAN DOCD: CPT | Performed by: PHYSICIAN ASSISTANT

## 2024-08-19 RX ORDER — BUMETANIDE 1 MG/1
1 TABLET ORAL DAILY
Qty: 90 TABLET | Refills: 0 | Status: SHIPPED | OUTPATIENT
Start: 2024-08-19

## 2024-08-19 RX ORDER — GABAPENTIN 100 MG/1
CAPSULE ORAL
Qty: 150 CAPSULE | Refills: 2 | Status: SHIPPED | OUTPATIENT
Start: 2024-08-19 | End: 2024-09-25

## 2024-08-19 NOTE — TELEPHONE ENCOUNTER
Left message for Director of Nursing \"Tony\" to call ([phone]) to discuss patient needs outlined by ILSA Reyes who personally examined patient today.

## 2024-08-19 NOTE — PROGRESS NOTES
Siomara Collins is a 79 y.o. female     Chief Complaint   Patient presents with    paperwork     Paperwork for scooter, UTI Symptoms        /82 (Site: Left Upper Arm, Position: Sitting, Cuff Size: Large Adult)   Pulse 76   Temp 98.8 °F (37.1 °C) (Oral)   Resp 17   Ht 1.6 m (5' 2.99\")   SpO2 94%   BMI 36.50 kg/m²     Health Maintenance Due   Topic Date Due    DEXA (modify frequency per FRAX score)  Never done    Respiratory Syncytial Virus (RSV) Pregnant or age 60 yrs+ (1 - 1-dose 60+ series) Never done    Shingles vaccine (2 of 3) 02/26/2017    COVID-19 Vaccine (1 - 2023-24 season) Never done    Flu vaccine (1) 08/01/2024         \"Have you been to the ER, urgent care clinic since your last visit?  Hospitalized since your last visit?\"    NO    “Have you seen or consulted any other health care providers outside of [facility] since your last visit?”    NO                    
with increasing Lasix from 20 mg once daily to 20 mg twice daily.  Was recently in the hospital and discharged 8/2/24.  She reports the edema has worsened since being home.  She is sitting in wheelchair with legs dangling and has not been wearing compression hose.  On dietary review it appears she is likely getting more salt than she realizes.  I obtained echocardiogram which was performed on 8/16/2024 showing normal EF 55 to 60% and no wall motion abnormalities, there is a new mild aortic stenosis and a mild annular calcification of mitral valve sounds stable.  I will switch her furosemide 20 mg twice daily over to Bumex 1 mg daily  I counseled her on low-salt diet  I recommended calf exercises and elevate legs next  Recommended that she wear the compression socks  While this is painful and may be complicating her mobility, I do not feel treatment with oxycodone is recommended and I explained this to her.    2.  Neuropathy  She has been on gabapentin 100 mg twice a day and 200 mg at bedtime.  She states this was increased from 1 Halen 100 mg 3 times a day back in March without much benefit.  I feel her uncontrolled diabetes and peripheral neuropathy are contributing mostly to her foot pain.  I will gradually increase her gabapentin.  I will increase her bedtime dose to 300 mg to see if she gets any benefit with nocturnal symptoms.  She is advised that she is on overall a low-dose of gabapentin and there is room to increase this dosing but need to be cautious of sedating during the daytime, especially with her living alone and independent living.    3.  Uncontrolled diabetes with hyperglycemia and insulin-dependent  Most recent A1c 10.2% on 7-24  She has upcoming appointment with endocrinology Dr. Colon on 8/29/2024 for further management  Likely contributing factor to recurrent UTIs    4.  Recurrent UTI  She has had frequent infection with ESBL Klebsiella  Antibiotic allergies to include for quinolones and sulfa

## 2024-08-20 ENCOUNTER — TELEPHONE (OUTPATIENT)
Facility: CLINIC | Age: 79
End: 2024-08-20

## 2024-08-20 NOTE — TELEPHONE ENCOUNTER
Left another message for The Director of Nursing at [facility] to call to discussed needs found by ILSA Patino at her visit at the office yesterday.

## 2024-08-20 NOTE — TELEPHONE ENCOUNTER
I ended up closing the note on Ms. Collins by accident but you can review the note I documented on the patient after speaking to the director of nursing.

## 2024-08-20 NOTE — TELEPHONE ENCOUNTER
Call received from the Director Nursing at the [facility].  Informed that she was evaluated by the PA here yesterday and had several concerns regarding independent living.  States they have concerns as a facility also but states that her and her son came to this decision and there is nothing they can do to change this.  They do have PT/OT services and will evaluate the ability regarding the scooter.  Also discussed risk for wounds on her buttocks because of continued sitting in soiled depends as was evident here yesterday.  Again stressed high risk of patient.  Faxed over to them the referrals and information regarding the request for the scooter.  Also faxed most recent office note for their review of concerns.

## 2024-08-21 DIAGNOSIS — I12.9 HYPERTENSION WITH RENAL DISEASE: Primary | ICD-10-CM

## 2024-08-21 DIAGNOSIS — E11.65 UNCONTROLLED TYPE 2 DIABETES MELLITUS WITH HYPERGLYCEMIA (HCC): ICD-10-CM

## 2024-08-21 DIAGNOSIS — N18.2 CONTROLLED TYPE 2 DIABETES MELLITUS WITH STAGE 2 CHRONIC KIDNEY DISEASE, WITH LONG-TERM CURRENT USE OF INSULIN (HCC): ICD-10-CM

## 2024-08-21 DIAGNOSIS — E11.22 CONTROLLED TYPE 2 DIABETES MELLITUS WITH STAGE 2 CHRONIC KIDNEY DISEASE, WITH LONG-TERM CURRENT USE OF INSULIN (HCC): ICD-10-CM

## 2024-08-21 DIAGNOSIS — Z79.4 CONTROLLED TYPE 2 DIABETES MELLITUS WITH STAGE 2 CHRONIC KIDNEY DISEASE, WITH LONG-TERM CURRENT USE OF INSULIN (HCC): ICD-10-CM

## 2024-08-21 NOTE — TELEPHONE ENCOUNTER
RX refill request from the patient/pharmacy. Patient last seen 08- without  labs, and next appt. scheduled for 10-  Requested Prescriptions     Pending Prescriptions Disp Refills    insulin NPH (NOVOLIN N) 100 UNIT/ML injection vial 10 mL 5     Sig: Inject 12 units daily.    .

## 2024-08-21 NOTE — PROGRESS NOTES
Remote Patient Monitoring Treatment Plan    Received request from [facility]/CTHeena Velasco, RN   to order remote patient monitoring for in home monitoring of Diabetes and HTN; Condition managed by PCP, Dr. Vang.  and order completed.     Patient will be monitoring blood pressure   glucose.      Patient will engage in Remote Patient Monitoring each day to develop the skills necessary for self management.       RPM Care Team Responsibilities:   Alerts will be reviewed daily and addressed within 2-4 hours during operational hours (Monday -Friday 9 am-4 pm)  Alert response and intervention documented in patient medical record  Alert response escalated to PCP per protocol and documented in patient medical record  Patient monitored over approximately  days  Discharge from program based on self-management readiness    See care coordination encounters for additional details.

## 2024-08-22 ENCOUNTER — CARE COORDINATION (OUTPATIENT)
Facility: CLINIC | Age: 79
End: 2024-08-22

## 2024-08-22 NOTE — CARE COORDINATION
Remote Patient Kit Ordering Note      Date/Time:  8/22/2024 10:00 AM      CCSS placed phone call to patient/family today to notify of RPM kit order; patient/family was available; discussed the following topics below and all questions answered.    [x] [facility]  [x] Patient will receive package over the next 1-3 business days. Someone 21 years or older must be present to sign for UPS delivery.  [x] HRS will contact patient within 24 hours, an HRS  will call the patient directly: If the patient does not answer, HRS will follow up with the clinical team notifying them about the unsuccessful attempt to contact the patient. HRS will make three call attempts to the patient.Provide patient with [facility] Virtual install number is: [phone].  [x] CTN will contact patient once equipment is active to welcome them to the program.                                                         [x] Hours of RPM monitoring - Monday-Friday 4317-8100; encourage patient to get vitals entered by Noon each day to have the alert addressed same day.  [x][facility] mailed RPM Patient flyer to patient.                      CTN made aware the RPM kit has been ordered.

## 2024-08-24 DIAGNOSIS — R60.9 EDEMA, UNSPECIFIED TYPE: ICD-10-CM

## 2024-08-25 DIAGNOSIS — E11.65 UNCONTROLLED TYPE 2 DIABETES MELLITUS WITH HYPERGLYCEMIA (HCC): ICD-10-CM

## 2024-08-26 RX ORDER — BUMETANIDE 1 MG/1
1 TABLET ORAL DAILY
Qty: 90 TABLET | Refills: 0 | Status: SHIPPED | OUTPATIENT
Start: 2024-08-26 | End: 2024-08-29

## 2024-08-28 DIAGNOSIS — E11.65 UNCONTROLLED TYPE 2 DIABETES MELLITUS WITH HYPERGLYCEMIA (HCC): ICD-10-CM

## 2024-08-29 ENCOUNTER — OFFICE VISIT (OUTPATIENT)
Age: 79
End: 2024-08-29
Payer: MEDICARE

## 2024-08-29 VITALS
HEIGHT: 63 IN | DIASTOLIC BLOOD PRESSURE: 64 MMHG | HEART RATE: 70 BPM | SYSTOLIC BLOOD PRESSURE: 125 MMHG | BODY MASS INDEX: 36.5 KG/M2

## 2024-08-29 DIAGNOSIS — R60.9 EDEMA, UNSPECIFIED TYPE: ICD-10-CM

## 2024-08-29 DIAGNOSIS — Z79.4 LONG TERM (CURRENT) USE OF INSULIN (HCC): ICD-10-CM

## 2024-08-29 DIAGNOSIS — E78.2 MIXED HYPERLIPIDEMIA: ICD-10-CM

## 2024-08-29 DIAGNOSIS — E11.29 TYPE 2 DIABETES MELLITUS WITH OTHER DIABETIC KIDNEY COMPLICATION (HCC): Primary | ICD-10-CM

## 2024-08-29 DIAGNOSIS — I10 ESSENTIAL (PRIMARY) HYPERTENSION: ICD-10-CM

## 2024-08-29 PROCEDURE — 3078F DIAST BP <80 MM HG: CPT | Performed by: INTERNAL MEDICINE

## 2024-08-29 PROCEDURE — 99215 OFFICE O/P EST HI 40 MIN: CPT | Performed by: INTERNAL MEDICINE

## 2024-08-29 PROCEDURE — G8427 DOCREV CUR MEDS BY ELIG CLIN: HCPCS | Performed by: INTERNAL MEDICINE

## 2024-08-29 PROCEDURE — G8400 PT W/DXA NO RESULTS DOC: HCPCS | Performed by: INTERNAL MEDICINE

## 2024-08-29 PROCEDURE — 1036F TOBACCO NON-USER: CPT | Performed by: INTERNAL MEDICINE

## 2024-08-29 PROCEDURE — 1090F PRES/ABSN URINE INCON ASSESS: CPT | Performed by: INTERNAL MEDICINE

## 2024-08-29 PROCEDURE — 1111F DSCHRG MED/CURRENT MED MERGE: CPT | Performed by: INTERNAL MEDICINE

## 2024-08-29 PROCEDURE — 3074F SYST BP LT 130 MM HG: CPT | Performed by: INTERNAL MEDICINE

## 2024-08-29 PROCEDURE — G2211 COMPLEX E/M VISIT ADD ON: HCPCS | Performed by: INTERNAL MEDICINE

## 2024-08-29 PROCEDURE — G8417 CALC BMI ABV UP PARAM F/U: HCPCS | Performed by: INTERNAL MEDICINE

## 2024-08-29 PROCEDURE — 3046F HEMOGLOBIN A1C LEVEL >9.0%: CPT | Performed by: INTERNAL MEDICINE

## 2024-08-29 PROCEDURE — 1123F ACP DISCUSS/DSCN MKR DOCD: CPT | Performed by: INTERNAL MEDICINE

## 2024-08-29 RX ORDER — LOPERAMIDE HCL 2 MG
CAPSULE ORAL PRN
COMMUNITY

## 2024-08-29 RX ORDER — INSULIN GLARGINE 100 [IU]/ML
INJECTION, SOLUTION SUBCUTANEOUS
Qty: 15 ML | Refills: 11 | Status: SHIPPED | OUTPATIENT
Start: 2024-08-29

## 2024-08-29 RX ORDER — MULTIVIT WITH MINERALS/LUTEIN
1000 TABLET ORAL DAILY
COMMUNITY

## 2024-08-29 RX ORDER — QUETIAPINE FUMARATE 25 MG/1
25 TABLET, FILM COATED ORAL EVERY 6 HOURS PRN
COMMUNITY

## 2024-08-29 RX ORDER — IBUPROFEN 200 MG
200 TABLET ORAL EVERY 6 HOURS PRN
COMMUNITY

## 2024-08-29 RX ORDER — BUMETANIDE 1 MG/1
1 TABLET ORAL DAILY
Qty: 90 TABLET | Refills: 3 | Status: SHIPPED | OUTPATIENT
Start: 2024-08-29

## 2024-08-29 RX ORDER — HUMAN INSULIN 100 [IU]/ML
INJECTION, SUSPENSION SUBCUTANEOUS
Qty: 5 ADJUSTABLE DOSE PRE-FILLED PEN SYRINGE | Refills: 11 | Status: SHIPPED | OUTPATIENT
Start: 2024-08-29

## 2024-08-29 NOTE — PROGRESS NOTES
Chief Complaint   Patient presents with    Diabetes     PCP and pharmacy confirmed     History of Present Illness: Siomara Collins is a 79 y.o. female here for follow up of diabetes.  I haven't seen her since Oct 2022. She was due to see me back in April 2023 but didn't make it to this appointment as she had been hospitalized in 3/23 for severe sepsis from UTI and hypoglycemia and went to rehab after this and ever since has not been back at home and eventually was moved to assistant living but over the past 2 weeks moved to an independent facility in [place] to help with cost.  She is unable to walk with a walker and is trying to get a power scooter.  She has been admitted 5 times in 2024 for different infections like UTI and pneumonia most recently at the end of July.  Still taking prednisone 30 mg daily.  At her last visit in 10/22 she was taking NPH 55 units in the morning and lantus 40 units in the morning and 30 units at night.  Her weight was around 174 at that time and now is closer to 205 lbs.  Has been dealing with more leg swelling and was just prescribed bumex 1 mg daily but states the pharmacy hasn't received this yet so I resent it today.  She brought in a bag with multiple different types of insulin including NPH pens, a vial of NPH, 1 vial of humalog, a humalog pen and 2 lantus vials.  It's unclear what she is exactly taking but it sounds like she has been taking 55 units of NPH in the morning and 15 units of NPH with each meal (not humalog).  She is confused and we agreed to try and make her regimen as simple as possible to help with compliance and she would prefer to use pens.      Current Outpatient Medications   Medication Sig    loperamide (IMODIUM) 2 MG capsule Take by mouth as needed    ibuprofen (ADVIL;MOTRIN) 200 MG tablet Take 1 tablet by mouth every 6 hours as needed for Pain    QUEtiapine (SEROQUEL) 25 MG tablet Take 1 tablet by mouth every 6 hours as needed    Magnesium 100 MG TABS Take by  I'll help you adjust your insulin.      Return 10/3/24 at 10:30am.    I spent a total of 45 minutes in both face-to-face and in non-face-to-face activities for the visit on the date of this encounter.      Copy sent to:  Irvin Vang MD Jones, Drew G, MD (Pulmonary Disease)

## 2024-08-29 NOTE — PATIENT INSTRUCTIONS
1) You will take novolin NPH (cloudy insulin) 55 units in the morning at the same time as the 30 mg of prednisone.    2) You will take lantus (clear long acting insulin) 30 units once daily in the morning.  I will send pens to the pharmacy.    3) The goal is to have your sugar in the morning between 100-140 and before dinner between 100-200.      4) If you are staying over 200 over the next week, message me over mychart and I'll help you adjust your insulin.

## 2024-09-03 DIAGNOSIS — E11.65 UNCONTROLLED TYPE 2 DIABETES MELLITUS WITH HYPERGLYCEMIA (HCC): Primary | ICD-10-CM

## 2024-09-03 RX ORDER — ACYCLOVIR 400 MG/1
TABLET ORAL
Qty: 9 EACH | Status: ON HOLD | OUTPATIENT
Start: 2024-09-03

## 2024-09-03 RX ORDER — ACYCLOVIR 400 MG/1
TABLET ORAL
Qty: 1 EACH | Refills: 0 | Status: ON HOLD | OUTPATIENT
Start: 2024-09-03

## 2024-09-03 NOTE — PROGRESS NOTES
Dr. Vang forwarded note from  regarding patient with needs to monitor her blood sugar as well as changing her Lantus to NPH.  Generated a referral to Diabetes educator for urgent appointment as well as order her a Dexcom G7 set up for continuous monitoring due to issues with elevated blood sugars.

## 2024-09-03 NOTE — TELEPHONE ENCOUNTER
RX refill request from the patient/pharmacy. Patient last seen 08- with labs, and next appt. scheduled for 09-  Requested Prescriptions     Pending Prescriptions Disp Refills    Continuous Glucose Sensor (DEXCOM G7 SENSOR) MISC 9 each PRN     Sig: Use as needed for control of diabetes.    Continuous Glucose  (DEXCOM G7 ) MALIA 1 each 0     Sig: Use to monitor blood sugar as needed.    .

## 2024-09-04 ENCOUNTER — HOSPITAL ENCOUNTER (INPATIENT)
Facility: HOSPITAL | Age: 79
LOS: 10 days | Discharge: NURSING, REHAB OR LONG-TERM CARE | DRG: 871 | End: 2024-09-15
Attending: EMERGENCY MEDICINE | Admitting: STUDENT IN AN ORGANIZED HEALTH CARE EDUCATION/TRAINING PROGRAM
Payer: MEDICARE

## 2024-09-04 DIAGNOSIS — R73.9 HYPERGLYCEMIA: ICD-10-CM

## 2024-09-04 DIAGNOSIS — E11.65 UNCONTROLLED TYPE 2 DIABETES MELLITUS WITH HYPERGLYCEMIA (HCC): ICD-10-CM

## 2024-09-04 DIAGNOSIS — A41.9 SEPSIS, DUE TO UNSPECIFIED ORGANISM, UNSPECIFIED WHETHER ACUTE ORGAN DYSFUNCTION PRESENT (HCC): ICD-10-CM

## 2024-09-04 DIAGNOSIS — A41.9 SEPSIS WITHOUT ACUTE ORGAN DYSFUNCTION, DUE TO UNSPECIFIED ORGANISM (HCC): Primary | ICD-10-CM

## 2024-09-04 DIAGNOSIS — G62.9 NEUROPATHY: ICD-10-CM

## 2024-09-04 DIAGNOSIS — E87.6 HYPOKALEMIA: ICD-10-CM

## 2024-09-04 LAB
ALBUMIN SERPL-MCNC: 3.1 G/DL (ref 3.5–5)
ALBUMIN/GLOB SERPL: 1 (ref 1.1–2.2)
ALP SERPL-CCNC: 129 U/L (ref 45–117)
ALT SERPL-CCNC: 67 U/L (ref 12–78)
ANION GAP SERPL CALC-SCNC: 9 MMOL/L (ref 5–15)
AST SERPL-CCNC: 35 U/L (ref 15–37)
BASOPHILS # BLD: 0.1 K/UL (ref 0–0.1)
BASOPHILS NFR BLD: 1 % (ref 0–1)
BILIRUB SERPL-MCNC: 0.7 MG/DL (ref 0.2–1)
BUN SERPL-MCNC: 49 MG/DL (ref 6–20)
BUN/CREAT SERPL: 36 (ref 12–20)
CALCIUM SERPL-MCNC: 9 MG/DL (ref 8.5–10.1)
CHLORIDE SERPL-SCNC: 88 MMOL/L (ref 97–108)
CO2 SERPL-SCNC: 34 MMOL/L (ref 21–32)
CREAT SERPL-MCNC: 1.36 MG/DL (ref 0.55–1.02)
DIFFERENTIAL METHOD BLD: ABNORMAL
EOSINOPHIL # BLD: 0.1 K/UL (ref 0–0.4)
EOSINOPHIL NFR BLD: 1 % (ref 0–7)
ERYTHROCYTE [DISTWIDTH] IN BLOOD BY AUTOMATED COUNT: 14 % (ref 11.5–14.5)
GLOBULIN SER CALC-MCNC: 3.2 G/DL (ref 2–4)
GLUCOSE BLD STRIP.AUTO-MCNC: 464 MG/DL (ref 65–117)
GLUCOSE SERPL-MCNC: 388 MG/DL (ref 65–100)
HCT VFR BLD AUTO: 40.1 % (ref 35–47)
HGB BLD-MCNC: 14.2 G/DL (ref 11.5–16)
IMM GRANULOCYTES # BLD AUTO: 0.1 K/UL (ref 0–0.04)
IMM GRANULOCYTES NFR BLD AUTO: 1 % (ref 0–0.5)
LYMPHOCYTES # BLD: 1.4 K/UL (ref 0.8–3.5)
LYMPHOCYTES NFR BLD: 20 % (ref 12–49)
MAGNESIUM SERPL-MCNC: 2.1 MG/DL (ref 1.6–2.4)
MCH RBC QN AUTO: 34.9 PG (ref 26–34)
MCHC RBC AUTO-ENTMCNC: 35.4 G/DL (ref 30–36.5)
MCV RBC AUTO: 98.5 FL (ref 80–99)
MONOCYTES # BLD: 0.7 K/UL (ref 0–1)
MONOCYTES NFR BLD: 10 % (ref 5–13)
NEUTS SEG # BLD: 4.8 K/UL (ref 1.8–8)
NEUTS SEG NFR BLD: 67 % (ref 32–75)
NRBC # BLD: 0 K/UL (ref 0–0.01)
NRBC BLD-RTO: 0 PER 100 WBC
NT PRO BNP: 252 PG/ML
PLATELET # BLD AUTO: 226 K/UL (ref 150–400)
PMV BLD AUTO: 10.5 FL (ref 8.9–12.9)
POTASSIUM SERPL-SCNC: 2.5 MMOL/L (ref 3.5–5.1)
PROT SERPL-MCNC: 6.3 G/DL (ref 6.4–8.2)
RBC # BLD AUTO: 4.07 M/UL (ref 3.8–5.2)
SERVICE CMNT-IMP: ABNORMAL
SODIUM SERPL-SCNC: 131 MMOL/L (ref 136–145)
TROPONIN I SERPL HS-MCNC: 41 NG/L (ref 0–51)
WBC # BLD AUTO: 7.2 K/UL (ref 3.6–11)

## 2024-09-04 PROCEDURE — 93005 ELECTROCARDIOGRAM TRACING: CPT | Performed by: EMERGENCY MEDICINE

## 2024-09-04 PROCEDURE — 96365 THER/PROPH/DIAG IV INF INIT: CPT

## 2024-09-04 PROCEDURE — 99285 EMERGENCY DEPT VISIT HI MDM: CPT

## 2024-09-04 PROCEDURE — 83880 ASSAY OF NATRIURETIC PEPTIDE: CPT

## 2024-09-04 PROCEDURE — 2580000003 HC RX 258: Performed by: EMERGENCY MEDICINE

## 2024-09-04 PROCEDURE — 82962 GLUCOSE BLOOD TEST: CPT

## 2024-09-04 PROCEDURE — 83735 ASSAY OF MAGNESIUM: CPT

## 2024-09-04 PROCEDURE — 96360 HYDRATION IV INFUSION INIT: CPT

## 2024-09-04 PROCEDURE — 87040 BLOOD CULTURE FOR BACTERIA: CPT

## 2024-09-04 PROCEDURE — 83605 ASSAY OF LACTIC ACID: CPT

## 2024-09-04 PROCEDURE — 80053 COMPREHEN METABOLIC PANEL: CPT

## 2024-09-04 PROCEDURE — 96361 HYDRATE IV INFUSION ADD-ON: CPT

## 2024-09-04 PROCEDURE — 85025 COMPLETE CBC W/AUTO DIFF WBC: CPT

## 2024-09-04 PROCEDURE — 36415 COLL VENOUS BLD VENIPUNCTURE: CPT

## 2024-09-04 PROCEDURE — 84484 ASSAY OF TROPONIN QUANT: CPT

## 2024-09-04 RX ORDER — POTASSIUM CHLORIDE 7.45 MG/ML
10 INJECTION INTRAVENOUS
Status: DISPENSED | OUTPATIENT
Start: 2024-09-04 | End: 2024-09-05

## 2024-09-04 RX ORDER — 0.9 % SODIUM CHLORIDE 0.9 %
1000 INTRAVENOUS SOLUTION INTRAVENOUS ONCE
Status: COMPLETED | OUTPATIENT
Start: 2024-09-04 | End: 2024-09-05

## 2024-09-04 RX ADMIN — SODIUM CHLORIDE 1000 ML: 9 INJECTION, SOLUTION INTRAVENOUS at 21:57

## 2024-09-04 RX ADMIN — SODIUM CHLORIDE 1000 ML: 9 INJECTION, SOLUTION INTRAVENOUS at 23:45

## 2024-09-04 RX ADMIN — SODIUM CHLORIDE 1000 ML: 9 INJECTION, SOLUTION INTRAVENOUS at 23:46

## 2024-09-04 ASSESSMENT — PAIN SCALES - GENERAL: PAINLEVEL_OUTOF10: 10

## 2024-09-05 ENCOUNTER — APPOINTMENT (OUTPATIENT)
Facility: HOSPITAL | Age: 79
DRG: 871 | End: 2024-09-05
Payer: MEDICARE

## 2024-09-05 ENCOUNTER — APPOINTMENT (OUTPATIENT)
Facility: HOSPITAL | Age: 79
DRG: 871 | End: 2024-09-05
Attending: FAMILY MEDICINE
Payer: MEDICARE

## 2024-09-05 PROBLEM — T38.0X5A IMMUNOSUPPRESSION DUE TO CHRONIC STEROID USE (HCC): Status: ACTIVE | Noted: 2024-09-05

## 2024-09-05 PROBLEM — E11.65 TYPE 2 DIABETES MELLITUS WITH HYPERGLYCEMIA, WITH LONG-TERM CURRENT USE OF INSULIN (HCC): Status: ACTIVE | Noted: 2024-09-05

## 2024-09-05 PROBLEM — Z79.52 IMMUNOSUPPRESSION DUE TO CHRONIC STEROID USE (HCC): Status: ACTIVE | Noted: 2024-09-05

## 2024-09-05 PROBLEM — Z86.19 HISTORY OF ESBL KLEBSIELLA PNEUMONIAE INFECTION: Status: ACTIVE | Noted: 2024-09-05

## 2024-09-05 PROBLEM — R62.7 FAILURE TO THRIVE IN ADULT: Status: ACTIVE | Noted: 2024-09-05

## 2024-09-05 PROBLEM — Z79.4 TYPE 2 DIABETES MELLITUS WITH HYPERGLYCEMIA, WITH LONG-TERM CURRENT USE OF INSULIN (HCC): Status: ACTIVE | Noted: 2024-09-05

## 2024-09-05 PROBLEM — R73.9 HYPERGLYCEMIA: Status: ACTIVE | Noted: 2024-09-05

## 2024-09-05 PROBLEM — D84.821 IMMUNOSUPPRESSION DUE TO CHRONIC STEROID USE (HCC): Status: ACTIVE | Noted: 2024-09-05

## 2024-09-05 PROBLEM — E11.65 POORLY CONTROLLED DIABETES MELLITUS (HCC): Status: ACTIVE | Noted: 2024-09-05

## 2024-09-05 LAB
ANION GAP SERPL CALC-SCNC: 5 MMOL/L (ref 2–12)
APPEARANCE UR: CLEAR
ARTERIAL PATENCY WRIST A: POSITIVE
B-OH-BUTYR SERPL-SCNC: 0.17 MMOL/L
BACTERIA URNS QL MICRO: ABNORMAL /HPF
BASE EXCESS BLD CALC-SCNC: 10.1 MMOL/L
BASOPHILS # BLD: 0 K/UL (ref 0–0.1)
BASOPHILS NFR BLD: 0 % (ref 0–1)
BDY SITE: ABNORMAL
BILIRUB UR QL: NEGATIVE
BUN SERPL-MCNC: 40 MG/DL (ref 6–20)
BUN/CREAT SERPL: 51 (ref 12–20)
CALCIUM SERPL-MCNC: 8.4 MG/DL (ref 8.5–10.1)
CHLORIDE SERPL-SCNC: 100 MMOL/L (ref 97–108)
CO2 SERPL-SCNC: 32 MMOL/L (ref 21–32)
COLOR UR: ABNORMAL
CREAT SERPL-MCNC: 0.79 MG/DL (ref 0.55–1.02)
DIFFERENTIAL METHOD BLD: ABNORMAL
EKG ATRIAL RATE: 85 BPM
EKG DIAGNOSIS: NORMAL
EKG P AXIS: 92 DEGREES
EKG P-R INTERVAL: 230 MS
EKG Q-T INTERVAL: 484 MS
EKG QRS DURATION: 150 MS
EKG QTC CALCULATION (BAZETT): 575 MS
EKG R AXIS: 101 DEGREES
EKG T AXIS: -37 DEGREES
EKG VENTRICULAR RATE: 85 BPM
EOSINOPHIL # BLD: 0.1 K/UL (ref 0–0.4)
EOSINOPHIL NFR BLD: 1 % (ref 0–7)
EPITH CASTS URNS QL MICRO: ABNORMAL /LPF
ERYTHROCYTE [DISTWIDTH] IN BLOOD BY AUTOMATED COUNT: 14.2 % (ref 11.5–14.5)
EST. AVERAGE GLUCOSE BLD GHB EST-MCNC: 255 MG/DL
GAS FLOW.O2 O2 DELIVERY SYS: ABNORMAL
GLUCOSE BLD STRIP.AUTO-MCNC: 139 MG/DL (ref 65–117)
GLUCOSE BLD STRIP.AUTO-MCNC: 144 MG/DL (ref 65–117)
GLUCOSE BLD STRIP.AUTO-MCNC: 157 MG/DL (ref 65–117)
GLUCOSE BLD STRIP.AUTO-MCNC: 229 MG/DL (ref 65–117)
GLUCOSE BLD STRIP.AUTO-MCNC: 238 MG/DL (ref 65–117)
GLUCOSE SERPL-MCNC: 114 MG/DL (ref 65–100)
GLUCOSE UR STRIP.AUTO-MCNC: 500 MG/DL
HBA1C MFR BLD: 10.5 % (ref 4–5.6)
HCO3 BLD-SCNC: 34.7 MMOL/L (ref 21–28)
HCT VFR BLD AUTO: 37.3 % (ref 35–47)
HGB BLD-MCNC: 12.9 G/DL (ref 11.5–16)
HGB UR QL STRIP: NEGATIVE
HYALINE CASTS URNS QL MICRO: ABNORMAL /LPF (ref 0–2)
IMM GRANULOCYTES # BLD AUTO: 0 K/UL (ref 0–0.04)
IMM GRANULOCYTES NFR BLD AUTO: 1 % (ref 0–0.5)
KETONES UR QL STRIP.AUTO: NEGATIVE MG/DL
LACTATE SERPL-SCNC: 1.6 MMOL/L (ref 0.4–2)
LACTATE SERPL-SCNC: 1.8 MMOL/L (ref 0.4–2)
LACTATE SERPL-SCNC: 2.2 MMOL/L (ref 0.4–2)
LACTATE SERPL-SCNC: 2.7 MMOL/L (ref 0.4–2)
LEUKOCYTE ESTERASE UR QL STRIP.AUTO: ABNORMAL
LYMPHOCYTES # BLD: 1.3 K/UL (ref 0.8–3.5)
LYMPHOCYTES NFR BLD: 19 % (ref 12–49)
MCH RBC QN AUTO: 34.7 PG (ref 26–34)
MCHC RBC AUTO-ENTMCNC: 34.6 G/DL (ref 30–36.5)
MCV RBC AUTO: 100.3 FL (ref 80–99)
MONOCYTES # BLD: 0.7 K/UL (ref 0–1)
MONOCYTES NFR BLD: 10 % (ref 5–13)
NEUTS SEG # BLD: 4.8 K/UL (ref 1.8–8)
NEUTS SEG NFR BLD: 69 % (ref 32–75)
NITRITE UR QL STRIP.AUTO: POSITIVE
NRBC # BLD: 0 K/UL (ref 0–0.01)
NRBC BLD-RTO: 0 PER 100 WBC
OSMOLALITY SERPL: 307 MOSM/KG H2O
PCO2 BLD: 46.1 MMHG (ref 35–48)
PH BLD: 7.49 (ref 7.35–7.45)
PH UR STRIP: 5.5 (ref 5–8)
PLATELET # BLD AUTO: 200 K/UL (ref 150–400)
PMV BLD AUTO: 10.2 FL (ref 8.9–12.9)
PO2 BLD: 63 MMHG (ref 83–108)
POTASSIUM SERPL-SCNC: 4.2 MMOL/L (ref 3.5–5.1)
PROT UR STRIP-MCNC: NEGATIVE MG/DL
RBC # BLD AUTO: 3.72 M/UL (ref 3.8–5.2)
RBC #/AREA URNS HPF: ABNORMAL /HPF (ref 0–5)
SAO2 % BLD: 93.2 % (ref 92–97)
SERVICE CMNT-IMP: ABNORMAL
SODIUM SERPL-SCNC: 137 MMOL/L (ref 136–145)
SP GR UR REFRACTOMETRY: 1.01 (ref 1–1.03)
SPECIMEN TYPE: ABNORMAL
URINE CULTURE IF INDICATED: ABNORMAL
UROBILINOGEN UR QL STRIP.AUTO: 0.2 EU/DL (ref 0.2–1)
WBC # BLD AUTO: 7 K/UL (ref 3.6–11)
WBC URNS QL MICRO: ABNORMAL /HPF (ref 0–4)

## 2024-09-05 PROCEDURE — 87088 URINE BACTERIA CULTURE: CPT

## 2024-09-05 PROCEDURE — 82010 KETONE BODYS QUAN: CPT

## 2024-09-05 PROCEDURE — 99221 1ST HOSP IP/OBS SF/LOW 40: CPT

## 2024-09-05 PROCEDURE — 6360000002 HC RX W HCPCS: Performed by: EMERGENCY MEDICINE

## 2024-09-05 PROCEDURE — 2700000000 HC OXYGEN THERAPY PER DAY

## 2024-09-05 PROCEDURE — 2580000003 HC RX 258: Performed by: STUDENT IN AN ORGANIZED HEALTH CARE EDUCATION/TRAINING PROGRAM

## 2024-09-05 PROCEDURE — 6370000000 HC RX 637 (ALT 250 FOR IP): Performed by: STUDENT IN AN ORGANIZED HEALTH CARE EDUCATION/TRAINING PROGRAM

## 2024-09-05 PROCEDURE — 6360000002 HC RX W HCPCS: Performed by: STUDENT IN AN ORGANIZED HEALTH CARE EDUCATION/TRAINING PROGRAM

## 2024-09-05 PROCEDURE — 82962 GLUCOSE BLOOD TEST: CPT

## 2024-09-05 PROCEDURE — 71045 X-RAY EXAM CHEST 1 VIEW: CPT

## 2024-09-05 PROCEDURE — 36600 WITHDRAWAL OF ARTERIAL BLOOD: CPT

## 2024-09-05 PROCEDURE — 85025 COMPLETE CBC W/AUTO DIFF WBC: CPT

## 2024-09-05 PROCEDURE — 36415 COLL VENOUS BLD VENIPUNCTURE: CPT

## 2024-09-05 PROCEDURE — 87186 SC STD MICRODIL/AGAR DIL: CPT

## 2024-09-05 PROCEDURE — 6360000002 HC RX W HCPCS: Performed by: HOSPITALIST

## 2024-09-05 PROCEDURE — 6370000000 HC RX 637 (ALT 250 FOR IP): Performed by: HOSPITALIST

## 2024-09-05 PROCEDURE — 97535 SELF CARE MNGMENT TRAINING: CPT | Performed by: OCCUPATIONAL THERAPIST

## 2024-09-05 PROCEDURE — 82803 BLOOD GASES ANY COMBINATION: CPT

## 2024-09-05 PROCEDURE — 80048 BASIC METABOLIC PNL TOTAL CA: CPT

## 2024-09-05 PROCEDURE — 2580000003 HC RX 258: Performed by: EMERGENCY MEDICINE

## 2024-09-05 PROCEDURE — 99223 1ST HOSP IP/OBS HIGH 75: CPT | Performed by: INTERNAL MEDICINE

## 2024-09-05 PROCEDURE — 97530 THERAPEUTIC ACTIVITIES: CPT

## 2024-09-05 PROCEDURE — 2000000000 HC ICU R&B

## 2024-09-05 PROCEDURE — 93010 ELECTROCARDIOGRAM REPORT: CPT | Performed by: SPECIALIST

## 2024-09-05 PROCEDURE — 6370000000 HC RX 637 (ALT 250 FOR IP): Performed by: FAMILY MEDICINE

## 2024-09-05 PROCEDURE — 6370000000 HC RX 637 (ALT 250 FOR IP)

## 2024-09-05 PROCEDURE — 97162 PT EVAL MOD COMPLEX 30 MIN: CPT

## 2024-09-05 PROCEDURE — 3E033XZ INTRODUCTION OF VASOPRESSOR INTO PERIPHERAL VEIN, PERCUTANEOUS APPROACH: ICD-10-PCS | Performed by: INTERNAL MEDICINE

## 2024-09-05 PROCEDURE — 2580000003 HC RX 258: Performed by: FAMILY MEDICINE

## 2024-09-05 PROCEDURE — 81001 URINALYSIS AUTO W/SCOPE: CPT

## 2024-09-05 PROCEDURE — 83036 HEMOGLOBIN GLYCOSYLATED A1C: CPT

## 2024-09-05 PROCEDURE — 83605 ASSAY OF LACTIC ACID: CPT

## 2024-09-05 PROCEDURE — 94761 N-INVAS EAR/PLS OXIMETRY MLT: CPT

## 2024-09-05 PROCEDURE — 2500000003 HC RX 250 WO HCPCS: Performed by: FAMILY MEDICINE

## 2024-09-05 PROCEDURE — 97530 THERAPEUTIC ACTIVITIES: CPT | Performed by: OCCUPATIONAL THERAPIST

## 2024-09-05 PROCEDURE — 83930 ASSAY OF BLOOD OSMOLALITY: CPT

## 2024-09-05 PROCEDURE — 6360000002 HC RX W HCPCS: Performed by: FAMILY MEDICINE

## 2024-09-05 PROCEDURE — 97165 OT EVAL LOW COMPLEX 30 MIN: CPT | Performed by: OCCUPATIONAL THERAPIST

## 2024-09-05 PROCEDURE — 87086 URINE CULTURE/COLONY COUNT: CPT

## 2024-09-05 RX ORDER — GABAPENTIN 100 MG/1
200 CAPSULE ORAL NIGHTLY
Status: DISCONTINUED | OUTPATIENT
Start: 2024-09-05 | End: 2024-09-15 | Stop reason: HOSPADM

## 2024-09-05 RX ORDER — INSULIN LISPRO 100 [IU]/ML
0-16 INJECTION, SOLUTION INTRAVENOUS; SUBCUTANEOUS
Status: DISCONTINUED | OUTPATIENT
Start: 2024-09-05 | End: 2024-09-15 | Stop reason: HOSPADM

## 2024-09-05 RX ORDER — SODIUM CHLORIDE 9 MG/ML
INJECTION, SOLUTION INTRAVENOUS PRN
Status: DISCONTINUED | OUTPATIENT
Start: 2024-09-05 | End: 2024-09-15 | Stop reason: HOSPADM

## 2024-09-05 RX ORDER — MIDODRINE HYDROCHLORIDE 5 MG/1
5 TABLET ORAL
Status: DISCONTINUED | OUTPATIENT
Start: 2024-09-05 | End: 2024-09-06

## 2024-09-05 RX ORDER — GABAPENTIN 100 MG/1
100 CAPSULE ORAL 3 TIMES DAILY
Status: DISCONTINUED | OUTPATIENT
Start: 2024-09-05 | End: 2024-09-05

## 2024-09-05 RX ORDER — HYDROCHLOROTHIAZIDE 25 MG/1
25 TABLET ORAL DAILY
Status: DISCONTINUED | OUTPATIENT
Start: 2024-09-05 | End: 2024-09-05

## 2024-09-05 RX ORDER — INSULIN GLARGINE 100 [IU]/ML
10 INJECTION, SOLUTION SUBCUTANEOUS NIGHTLY
Status: DISCONTINUED | OUTPATIENT
Start: 2024-09-05 | End: 2024-09-05

## 2024-09-05 RX ORDER — INSULIN LISPRO 100 [IU]/ML
3 INJECTION, SOLUTION INTRAVENOUS; SUBCUTANEOUS
Status: DISCONTINUED | OUTPATIENT
Start: 2024-09-05 | End: 2024-09-05

## 2024-09-05 RX ORDER — SODIUM CHLORIDE, SODIUM LACTATE, POTASSIUM CHLORIDE, CALCIUM CHLORIDE 600; 310; 30; 20 MG/100ML; MG/100ML; MG/100ML; MG/100ML
INJECTION, SOLUTION INTRAVENOUS CONTINUOUS
Status: DISCONTINUED | OUTPATIENT
Start: 2024-09-05 | End: 2024-09-06

## 2024-09-05 RX ORDER — ACETAMINOPHEN 650 MG/1
650 SUPPOSITORY RECTAL EVERY 6 HOURS PRN
Status: DISCONTINUED | OUTPATIENT
Start: 2024-09-05 | End: 2024-09-15 | Stop reason: HOSPADM

## 2024-09-05 RX ORDER — SODIUM CHLORIDE, SODIUM LACTATE, POTASSIUM CHLORIDE, AND CALCIUM CHLORIDE .6; .31; .03; .02 G/100ML; G/100ML; G/100ML; G/100ML
1000 INJECTION, SOLUTION INTRAVENOUS ONCE
Status: COMPLETED | OUTPATIENT
Start: 2024-09-05 | End: 2024-09-05

## 2024-09-05 RX ORDER — POTASSIUM CHLORIDE 7.45 MG/ML
10 INJECTION INTRAVENOUS PRN
Status: DISCONTINUED | OUTPATIENT
Start: 2024-09-05 | End: 2024-09-15 | Stop reason: HOSPADM

## 2024-09-05 RX ORDER — INSULIN LISPRO 100 [IU]/ML
0-4 INJECTION, SOLUTION INTRAVENOUS; SUBCUTANEOUS NIGHTLY
Status: DISCONTINUED | OUTPATIENT
Start: 2024-09-05 | End: 2024-09-15 | Stop reason: HOSPADM

## 2024-09-05 RX ORDER — FUROSEMIDE 40 MG
20 TABLET ORAL DAILY
Status: DISCONTINUED | OUTPATIENT
Start: 2024-09-05 | End: 2024-09-05

## 2024-09-05 RX ORDER — ROSUVASTATIN CALCIUM 10 MG/1
10 TABLET, COATED ORAL NIGHTLY
Status: DISCONTINUED | OUTPATIENT
Start: 2024-09-05 | End: 2024-09-05

## 2024-09-05 RX ORDER — BUPROPION HYDROCHLORIDE 100 MG/1
100 TABLET ORAL 2 TIMES DAILY
Status: DISCONTINUED | OUTPATIENT
Start: 2024-09-05 | End: 2024-09-05

## 2024-09-05 RX ORDER — LIDOCAINE 4 G/G
1 PATCH TOPICAL DAILY
Status: DISCONTINUED | OUTPATIENT
Start: 2024-09-05 | End: 2024-09-15 | Stop reason: HOSPADM

## 2024-09-05 RX ORDER — POTASSIUM CHLORIDE 750 MG/1
40 TABLET, EXTENDED RELEASE ORAL PRN
Status: DISCONTINUED | OUTPATIENT
Start: 2024-09-05 | End: 2024-09-15 | Stop reason: HOSPADM

## 2024-09-05 RX ORDER — ENOXAPARIN SODIUM 100 MG/ML
40 INJECTION SUBCUTANEOUS DAILY
Status: DISCONTINUED | OUTPATIENT
Start: 2024-09-05 | End: 2024-09-15 | Stop reason: HOSPADM

## 2024-09-05 RX ORDER — INSULIN LISPRO 100 [IU]/ML
4 INJECTION, SOLUTION INTRAVENOUS; SUBCUTANEOUS
Status: DISCONTINUED | OUTPATIENT
Start: 2024-09-05 | End: 2024-09-06

## 2024-09-05 RX ORDER — SODIUM CHLORIDE, SODIUM LACTATE, POTASSIUM CHLORIDE, AND CALCIUM CHLORIDE .6; .31; .03; .02 G/100ML; G/100ML; G/100ML; G/100ML
500 INJECTION, SOLUTION INTRAVENOUS ONCE
Status: COMPLETED | OUTPATIENT
Start: 2024-09-05 | End: 2024-09-05

## 2024-09-05 RX ORDER — ONDANSETRON 4 MG/1
4 TABLET, ORALLY DISINTEGRATING ORAL EVERY 8 HOURS PRN
Status: DISCONTINUED | OUTPATIENT
Start: 2024-09-05 | End: 2024-09-05

## 2024-09-05 RX ORDER — SODIUM CHLORIDE 0.9 % (FLUSH) 0.9 %
5-40 SYRINGE (ML) INJECTION EVERY 12 HOURS SCHEDULED
Status: DISCONTINUED | OUTPATIENT
Start: 2024-09-05 | End: 2024-09-15 | Stop reason: HOSPADM

## 2024-09-05 RX ORDER — GABAPENTIN 100 MG/1
100 CAPSULE ORAL 2 TIMES DAILY
Status: DISCONTINUED | OUTPATIENT
Start: 2024-09-05 | End: 2024-09-15 | Stop reason: HOSPADM

## 2024-09-05 RX ORDER — INSULIN GLARGINE 100 [IU]/ML
0.15 INJECTION, SOLUTION SUBCUTANEOUS NIGHTLY
Status: DISCONTINUED | OUTPATIENT
Start: 2024-09-05 | End: 2024-09-06

## 2024-09-05 RX ORDER — NOREPINEPHRINE BITARTRATE 0.06 MG/ML
1-100 INJECTION, SOLUTION INTRAVENOUS CONTINUOUS
Status: DISCONTINUED | OUTPATIENT
Start: 2024-09-05 | End: 2024-09-06

## 2024-09-05 RX ORDER — DEXTROSE MONOHYDRATE 100 MG/ML
INJECTION, SOLUTION INTRAVENOUS CONTINUOUS PRN
Status: DISCONTINUED | OUTPATIENT
Start: 2024-09-05 | End: 2024-09-15 | Stop reason: HOSPADM

## 2024-09-05 RX ORDER — ESCITALOPRAM OXALATE 10 MG/1
10 TABLET ORAL DAILY
Status: DISCONTINUED | OUTPATIENT
Start: 2024-09-05 | End: 2024-09-15 | Stop reason: HOSPADM

## 2024-09-05 RX ORDER — EZETIMIBE 10 MG/1
10 TABLET ORAL NIGHTLY
Status: DISCONTINUED | OUTPATIENT
Start: 2024-09-05 | End: 2024-09-15 | Stop reason: HOSPADM

## 2024-09-05 RX ORDER — MAGNESIUM SULFATE IN WATER 40 MG/ML
2000 INJECTION, SOLUTION INTRAVENOUS PRN
Status: DISCONTINUED | OUTPATIENT
Start: 2024-09-05 | End: 2024-09-15 | Stop reason: HOSPADM

## 2024-09-05 RX ORDER — ACETAMINOPHEN 325 MG/1
650 TABLET ORAL EVERY 6 HOURS PRN
Status: DISCONTINUED | OUTPATIENT
Start: 2024-09-05 | End: 2024-09-15 | Stop reason: HOSPADM

## 2024-09-05 RX ORDER — LISINOPRIL 5 MG/1
10 TABLET ORAL DAILY
Status: DISCONTINUED | OUTPATIENT
Start: 2024-09-05 | End: 2024-09-05

## 2024-09-05 RX ORDER — SODIUM CHLORIDE 0.9 % (FLUSH) 0.9 %
5-40 SYRINGE (ML) INJECTION PRN
Status: DISCONTINUED | OUTPATIENT
Start: 2024-09-05 | End: 2024-09-15 | Stop reason: HOSPADM

## 2024-09-05 RX ORDER — INSULIN LISPRO 100 [IU]/ML
20 INJECTION, SOLUTION INTRAVENOUS; SUBCUTANEOUS ONCE
Status: DISCONTINUED | OUTPATIENT
Start: 2024-09-05 | End: 2024-09-05

## 2024-09-05 RX ORDER — PREDNISONE 20 MG/1
60 TABLET ORAL DAILY
Status: DISCONTINUED | OUTPATIENT
Start: 2024-09-05 | End: 2024-09-06

## 2024-09-05 RX ORDER — ROSUVASTATIN CALCIUM 10 MG/1
20 TABLET, COATED ORAL NIGHTLY
Status: DISCONTINUED | OUTPATIENT
Start: 2024-09-05 | End: 2024-09-10

## 2024-09-05 RX ORDER — POLYETHYLENE GLYCOL 3350 17 G/17G
17 POWDER, FOR SOLUTION ORAL DAILY PRN
Status: DISCONTINUED | OUTPATIENT
Start: 2024-09-05 | End: 2024-09-15 | Stop reason: HOSPADM

## 2024-09-05 RX ORDER — CARVEDILOL 12.5 MG/1
12.5 TABLET ORAL 2 TIMES DAILY WITH MEALS
Status: DISCONTINUED | OUTPATIENT
Start: 2024-09-05 | End: 2024-09-15 | Stop reason: HOSPADM

## 2024-09-05 RX ORDER — ONDANSETRON 2 MG/ML
4 INJECTION INTRAMUSCULAR; INTRAVENOUS EVERY 6 HOURS PRN
Status: DISCONTINUED | OUTPATIENT
Start: 2024-09-05 | End: 2024-09-05

## 2024-09-05 RX ADMIN — SODIUM CHLORIDE, POTASSIUM CHLORIDE, SODIUM LACTATE AND CALCIUM CHLORIDE 1000 ML: 600; 310; 30; 20 INJECTION, SOLUTION INTRAVENOUS at 08:31

## 2024-09-05 RX ADMIN — INSULIN GLARGINE 10 UNITS: 100 INJECTION, SOLUTION SUBCUTANEOUS at 02:09

## 2024-09-05 RX ADMIN — GABAPENTIN 100 MG: 100 CAPSULE ORAL at 13:55

## 2024-09-05 RX ADMIN — ENOXAPARIN SODIUM 40 MG: 100 INJECTION SUBCUTANEOUS at 08:14

## 2024-09-05 RX ADMIN — EZETIMIBE 10 MG: 10 TABLET ORAL at 20:02

## 2024-09-05 RX ADMIN — INSULIN GLARGINE 14 UNITS: 100 INJECTION, SOLUTION SUBCUTANEOUS at 20:03

## 2024-09-05 RX ADMIN — MIDODRINE HYDROCHLORIDE 5 MG: 5 TABLET ORAL at 13:51

## 2024-09-05 RX ADMIN — SODIUM CHLORIDE, PRESERVATIVE FREE 10 ML: 5 INJECTION INTRAVENOUS at 08:18

## 2024-09-05 RX ADMIN — SODIUM CHLORIDE, PRESERVATIVE FREE 10 ML: 5 INJECTION INTRAVENOUS at 20:02

## 2024-09-05 RX ADMIN — SODIUM CHLORIDE, POTASSIUM CHLORIDE, SODIUM LACTATE AND CALCIUM CHLORIDE: 600; 310; 30; 20 INJECTION, SOLUTION INTRAVENOUS at 14:46

## 2024-09-05 RX ADMIN — CARVEDILOL 12.5 MG: 12.5 TABLET, FILM COATED ORAL at 08:17

## 2024-09-05 RX ADMIN — POTASSIUM BICARBONATE 40 MEQ: 782 TABLET, EFFERVESCENT ORAL at 08:16

## 2024-09-05 RX ADMIN — GABAPENTIN 200 MG: 100 CAPSULE ORAL at 20:01

## 2024-09-05 RX ADMIN — HYDROCORTISONE SODIUM SUCCINATE 50 MG: 100 INJECTION, POWDER, FOR SOLUTION INTRAMUSCULAR; INTRAVENOUS at 23:44

## 2024-09-05 RX ADMIN — ROSUVASTATIN CALCIUM 20 MG: 10 TABLET, COATED ORAL at 20:02

## 2024-09-05 RX ADMIN — ESCITALOPRAM OXALATE 10 MG: 10 TABLET ORAL at 08:17

## 2024-09-05 RX ADMIN — SODIUM CHLORIDE, POTASSIUM CHLORIDE, SODIUM LACTATE AND CALCIUM CHLORIDE 500 ML: 600; 310; 30; 20 INJECTION, SOLUTION INTRAVENOUS at 12:27

## 2024-09-05 RX ADMIN — SODIUM CHLORIDE, POTASSIUM CHLORIDE, SODIUM LACTATE AND CALCIUM CHLORIDE: 600; 310; 30; 20 INJECTION, SOLUTION INTRAVENOUS at 10:57

## 2024-09-05 RX ADMIN — HYDROCORTISONE SODIUM SUCCINATE 50 MG: 100 INJECTION, POWDER, FOR SOLUTION INTRAMUSCULAR; INTRAVENOUS at 18:06

## 2024-09-05 RX ADMIN — MIDODRINE HYDROCHLORIDE 5 MG: 5 TABLET ORAL at 16:29

## 2024-09-05 RX ADMIN — INSULIN LISPRO 3 UNITS: 100 INJECTION, SOLUTION INTRAVENOUS; SUBCUTANEOUS at 08:15

## 2024-09-05 RX ADMIN — SODIUM CHLORIDE, POTASSIUM CHLORIDE, SODIUM LACTATE AND CALCIUM CHLORIDE: 600; 310; 30; 20 INJECTION, SOLUTION INTRAVENOUS at 01:43

## 2024-09-05 RX ADMIN — MEROPENEM 1000 MG: 1 INJECTION INTRAVENOUS at 23:49

## 2024-09-05 RX ADMIN — ACETAMINOPHEN 650 MG: 325 TABLET ORAL at 05:07

## 2024-09-05 RX ADMIN — PREDNISONE 60 MG: 20 TABLET ORAL at 16:29

## 2024-09-05 RX ADMIN — POTASSIUM CHLORIDE 10 MEQ: 7.46 INJECTION, SOLUTION INTRAVENOUS at 03:18

## 2024-09-05 RX ADMIN — MEROPENEM 1000 MG: 1 INJECTION, POWDER, FOR SOLUTION INTRAVENOUS at 00:25

## 2024-09-05 RX ADMIN — VANCOMYCIN HYDROCHLORIDE 2250 MG: 10 INJECTION, POWDER, LYOPHILIZED, FOR SOLUTION INTRAVENOUS at 12:45

## 2024-09-05 RX ADMIN — INSULIN LISPRO 3 UNITS: 100 INJECTION, SOLUTION INTRAVENOUS; SUBCUTANEOUS at 12:30

## 2024-09-05 RX ADMIN — SODIUM CHLORIDE, POTASSIUM CHLORIDE, SODIUM LACTATE AND CALCIUM CHLORIDE: 600; 310; 30; 20 INJECTION, SOLUTION INTRAVENOUS at 19:47

## 2024-09-05 RX ADMIN — POTASSIUM BICARBONATE 40 MEQ: 782 TABLET, EFFERVESCENT ORAL at 02:09

## 2024-09-05 RX ADMIN — MEROPENEM 1000 MG: 1 INJECTION INTRAVENOUS at 12:56

## 2024-09-05 RX ADMIN — POTASSIUM CHLORIDE 10 MEQ: 7.46 INJECTION, SOLUTION INTRAVENOUS at 01:47

## 2024-09-05 RX ADMIN — POTASSIUM CHLORIDE 10 MEQ: 7.46 INJECTION, SOLUTION INTRAVENOUS at 05:06

## 2024-09-05 RX ADMIN — PANTOPRAZOLE SODIUM 40 MG: 40 INJECTION, POWDER, FOR SOLUTION INTRAVENOUS at 08:17

## 2024-09-05 RX ADMIN — SODIUM CHLORIDE 5 MCG/MIN: 9 INJECTION, SOLUTION INTRAVENOUS at 12:37

## 2024-09-05 RX ADMIN — POTASSIUM BICARBONATE 40 MEQ: 782 TABLET, EFFERVESCENT ORAL at 05:06

## 2024-09-05 RX ADMIN — POTASSIUM CHLORIDE 10 MEQ: 7.46 INJECTION, SOLUTION INTRAVENOUS at 00:16

## 2024-09-05 ASSESSMENT — PAIN - FUNCTIONAL ASSESSMENT: PAIN_FUNCTIONAL_ASSESSMENT: PREVENTS OR INTERFERES SOME ACTIVE ACTIVITIES AND ADLS

## 2024-09-05 ASSESSMENT — PAIN DESCRIPTION - DESCRIPTORS: DESCRIPTORS: ACHING;TIGHTNESS

## 2024-09-05 ASSESSMENT — PAIN DESCRIPTION - LOCATION: LOCATION: LEG;BACK

## 2024-09-05 ASSESSMENT — PAIN SCALES - GENERAL
PAINLEVEL_OUTOF10: 3
PAINLEVEL_OUTOF10: 0
PAINLEVEL_OUTOF10: 2

## 2024-09-05 ASSESSMENT — PAIN DESCRIPTION - ORIENTATION: ORIENTATION: RIGHT;LEFT;LOWER

## 2024-09-06 ENCOUNTER — APPOINTMENT (OUTPATIENT)
Facility: HOSPITAL | Age: 79
DRG: 871 | End: 2024-09-06
Attending: FAMILY MEDICINE
Payer: MEDICARE

## 2024-09-06 LAB
ANION GAP SERPL CALC-SCNC: 3 MMOL/L (ref 2–12)
BACTERIA SPEC CULT: NORMAL
BACTERIA SPEC CULT: NORMAL
BASOPHILS # BLD: 0 K/UL (ref 0–0.1)
BASOPHILS NFR BLD: 0 % (ref 0–1)
BUN SERPL-MCNC: 26 MG/DL (ref 6–20)
BUN/CREAT SERPL: 36 (ref 12–20)
CALCIUM SERPL-MCNC: 8.2 MG/DL (ref 8.5–10.1)
CHLORIDE SERPL-SCNC: 105 MMOL/L (ref 97–108)
CO2 SERPL-SCNC: 30 MMOL/L (ref 21–32)
CREAT SERPL-MCNC: 0.73 MG/DL (ref 0.55–1.02)
DIFFERENTIAL METHOD BLD: ABNORMAL
ECHO AO ARCH DIAM: 2.5 CM
ECHO AO ASC DIAM: 4 CM
ECHO AO ASCENDING AORTA INDEX: 2.08 CM/M2
ECHO AO ROOT DIAM: 3.8 CM
ECHO AO ROOT INDEX: 1.98 CM/M2
ECHO AV AREA PEAK VELOCITY: 1.6 CM2
ECHO AV AREA VTI: 1.4 CM2
ECHO AV AREA/BSA PEAK VELOCITY: 0.8 CM2/M2
ECHO AV AREA/BSA VTI: 0.7 CM2/M2
ECHO AV MEAN GRADIENT: 20 MMHG
ECHO AV MEAN VELOCITY: 2.1 M/S
ECHO AV PEAK GRADIENT: 33 MMHG
ECHO AV PEAK VELOCITY: 2.8 M/S
ECHO AV VELOCITY RATIO: 0.36
ECHO AV VTI: 69.2 CM
ECHO BSA: 2 M2
ECHO LA DIAMETER INDEX: 1.77 CM/M2
ECHO LA DIAMETER: 3.4 CM
ECHO LA TO AORTIC ROOT RATIO: 0.89
ECHO LA VOL A-L A2C: 24 ML (ref 22–52)
ECHO LA VOL A-L A4C: 31 ML (ref 22–52)
ECHO LA VOL BP: 26 ML (ref 22–52)
ECHO LA VOL MOD A2C: 24 ML (ref 22–52)
ECHO LA VOL MOD A4C: 27 ML (ref 22–52)
ECHO LA VOL/BSA BIPLANE: 14 ML/M2 (ref 16–34)
ECHO LA VOLUME AREA LENGTH: 29 ML
ECHO LA VOLUME INDEX A-L A2C: 13 ML/M2 (ref 16–34)
ECHO LA VOLUME INDEX A-L A4C: 16 ML/M2 (ref 16–34)
ECHO LA VOLUME INDEX AREA LENGTH: 15 ML/M2 (ref 16–34)
ECHO LA VOLUME INDEX MOD A2C: 13 ML/M2 (ref 16–34)
ECHO LA VOLUME INDEX MOD A4C: 14 ML/M2 (ref 16–34)
ECHO LV E' LATERAL VELOCITY: 7 CM/S
ECHO LV E' SEPTAL VELOCITY: 9 CM/S
ECHO LV EF PHYSICIAN: 55 %
ECHO LV FRACTIONAL SHORTENING: 39 % (ref 28–44)
ECHO LV INTERNAL DIMENSION DIASTOLE INDEX: 2.29 CM/M2
ECHO LV INTERNAL DIMENSION DIASTOLIC: 4.4 CM (ref 3.9–5.3)
ECHO LV INTERNAL DIMENSION SYSTOLIC INDEX: 1.41 CM/M2
ECHO LV INTERNAL DIMENSION SYSTOLIC: 2.7 CM
ECHO LV IVSD: 0.8 CM (ref 0.6–0.9)
ECHO LV MASS 2D: 109.4 G (ref 67–162)
ECHO LV MASS INDEX 2D: 57 G/M2 (ref 43–95)
ECHO LV POSTERIOR WALL DIASTOLIC: 0.8 CM (ref 0.6–0.9)
ECHO LV RELATIVE WALL THICKNESS RATIO: 0.36
ECHO LVOT AREA: 4.9 CM2
ECHO LVOT AV VTI INDEX: 0.29
ECHO LVOT DIAM: 2.5 CM
ECHO LVOT MEAN GRADIENT: 2 MMHG
ECHO LVOT PEAK GRADIENT: 4 MMHG
ECHO LVOT PEAK VELOCITY: 1 M/S
ECHO LVOT STROKE VOLUME INDEX: 51.1 ML/M2
ECHO LVOT SV: 98.1 ML
ECHO LVOT VTI: 20 CM
ECHO MV A VELOCITY: 1.02 M/S
ECHO MV E DECELERATION TIME (DT): 172.2 MS
ECHO MV E VELOCITY: 0.91 M/S
ECHO MV E/A RATIO: 0.89
ECHO MV E/E' LATERAL: 13
ECHO MV E/E' RATIO (AVERAGED): 11.56
ECHO MV E/E' SEPTAL: 10.11
ECHO PV MAX VELOCITY: 1 M/S
ECHO PV PEAK GRADIENT: 4 MMHG
ECHO RV FREE WALL PEAK S': 8 CM/S
ECHO RV INTERNAL DIMENSION: 3.7 CM
ECHO RV TAPSE: 2.4 CM (ref 1.7–?)
ECHO TV REGURGITANT MAX VELOCITY: 2.43 M/S
ECHO TV REGURGITANT PEAK GRADIENT: 24 MMHG
EOSINOPHIL # BLD: 0 K/UL (ref 0–0.4)
EOSINOPHIL NFR BLD: 0 % (ref 0–7)
ERYTHROCYTE [DISTWIDTH] IN BLOOD BY AUTOMATED COUNT: 14 % (ref 11.5–14.5)
GLUCOSE BLD STRIP.AUTO-MCNC: 239 MG/DL (ref 65–117)
GLUCOSE BLD STRIP.AUTO-MCNC: 260 MG/DL (ref 65–117)
GLUCOSE BLD STRIP.AUTO-MCNC: 267 MG/DL (ref 65–117)
GLUCOSE BLD STRIP.AUTO-MCNC: 268 MG/DL (ref 65–117)
GLUCOSE BLD STRIP.AUTO-MCNC: 349 MG/DL (ref 65–117)
GLUCOSE SERPL-MCNC: 228 MG/DL (ref 65–100)
HCT VFR BLD AUTO: 36.2 % (ref 35–47)
HGB BLD-MCNC: 12.5 G/DL (ref 11.5–16)
IMM GRANULOCYTES # BLD AUTO: 0 K/UL (ref 0–0.04)
IMM GRANULOCYTES NFR BLD AUTO: 0 % (ref 0–0.5)
LYMPHOCYTES # BLD: 0.3 K/UL (ref 0.8–3.5)
LYMPHOCYTES NFR BLD: 6 % (ref 12–49)
MCH RBC QN AUTO: 35.2 PG (ref 26–34)
MCHC RBC AUTO-ENTMCNC: 34.5 G/DL (ref 30–36.5)
MCV RBC AUTO: 102 FL (ref 80–99)
MONOCYTES # BLD: 0.2 K/UL (ref 0–1)
MONOCYTES NFR BLD: 3 % (ref 5–13)
NEUTS SEG # BLD: 4.6 K/UL (ref 1.8–8)
NEUTS SEG NFR BLD: 91 % (ref 32–75)
NRBC # BLD: 0 K/UL (ref 0–0.01)
NRBC BLD-RTO: 0 PER 100 WBC
PLATELET # BLD AUTO: 205 K/UL (ref 150–400)
PMV BLD AUTO: 10.9 FL (ref 8.9–12.9)
POTASSIUM SERPL-SCNC: 4.4 MMOL/L (ref 3.5–5.1)
RBC # BLD AUTO: 3.55 M/UL (ref 3.8–5.2)
RBC MORPH BLD: ABNORMAL
SERVICE CMNT-IMP: ABNORMAL
SERVICE CMNT-IMP: NORMAL
SODIUM SERPL-SCNC: 138 MMOL/L (ref 136–145)
WBC # BLD AUTO: 5.1 K/UL (ref 3.6–11)

## 2024-09-06 PROCEDURE — 6370000000 HC RX 637 (ALT 250 FOR IP)

## 2024-09-06 PROCEDURE — 97530 THERAPEUTIC ACTIVITIES: CPT | Performed by: OCCUPATIONAL THERAPIST

## 2024-09-06 PROCEDURE — 6360000002 HC RX W HCPCS: Performed by: STUDENT IN AN ORGANIZED HEALTH CARE EDUCATION/TRAINING PROGRAM

## 2024-09-06 PROCEDURE — 99231 SBSQ HOSP IP/OBS SF/LOW 25: CPT

## 2024-09-06 PROCEDURE — 6370000000 HC RX 637 (ALT 250 FOR IP): Performed by: STUDENT IN AN ORGANIZED HEALTH CARE EDUCATION/TRAINING PROGRAM

## 2024-09-06 PROCEDURE — 2580000003 HC RX 258: Performed by: STUDENT IN AN ORGANIZED HEALTH CARE EDUCATION/TRAINING PROGRAM

## 2024-09-06 PROCEDURE — 93306 TTE W/DOPPLER COMPLETE: CPT

## 2024-09-06 PROCEDURE — 2580000003 HC RX 258: Performed by: FAMILY MEDICINE

## 2024-09-06 PROCEDURE — 6370000000 HC RX 637 (ALT 250 FOR IP): Performed by: HOSPITALIST

## 2024-09-06 PROCEDURE — 6360000002 HC RX W HCPCS: Performed by: HOSPITALIST

## 2024-09-06 PROCEDURE — 6370000000 HC RX 637 (ALT 250 FOR IP): Performed by: FAMILY MEDICINE

## 2024-09-06 PROCEDURE — 97530 THERAPEUTIC ACTIVITIES: CPT

## 2024-09-06 PROCEDURE — 80048 BASIC METABOLIC PNL TOTAL CA: CPT

## 2024-09-06 PROCEDURE — 6360000002 HC RX W HCPCS: Performed by: FAMILY MEDICINE

## 2024-09-06 PROCEDURE — 1100000000 HC RM PRIVATE

## 2024-09-06 PROCEDURE — 0202U NFCT DS 22 TRGT SARS-COV-2: CPT

## 2024-09-06 PROCEDURE — 93306 TTE W/DOPPLER COMPLETE: CPT | Performed by: STUDENT IN AN ORGANIZED HEALTH CARE EDUCATION/TRAINING PROGRAM

## 2024-09-06 PROCEDURE — 99232 SBSQ HOSP IP/OBS MODERATE 35: CPT | Performed by: INTERNAL MEDICINE

## 2024-09-06 PROCEDURE — 36415 COLL VENOUS BLD VENIPUNCTURE: CPT

## 2024-09-06 PROCEDURE — 82962 GLUCOSE BLOOD TEST: CPT

## 2024-09-06 PROCEDURE — 6370000000 HC RX 637 (ALT 250 FOR IP): Performed by: NURSE PRACTITIONER

## 2024-09-06 PROCEDURE — 85025 COMPLETE CBC W/AUTO DIFF WBC: CPT

## 2024-09-06 PROCEDURE — 2700000000 HC OXYGEN THERAPY PER DAY

## 2024-09-06 RX ORDER — INSULIN LISPRO 100 [IU]/ML
5 INJECTION, SOLUTION INTRAVENOUS; SUBCUTANEOUS
Status: DISCONTINUED | OUTPATIENT
Start: 2024-09-06 | End: 2024-09-06

## 2024-09-06 RX ORDER — PREDNISONE 20 MG/1
60 TABLET ORAL DAILY
Status: DISCONTINUED | OUTPATIENT
Start: 2024-09-06 | End: 2024-09-08

## 2024-09-06 RX ORDER — INSULIN LISPRO 100 [IU]/ML
6 INJECTION, SOLUTION INTRAVENOUS; SUBCUTANEOUS
Status: DISCONTINUED | OUTPATIENT
Start: 2024-09-06 | End: 2024-09-15 | Stop reason: HOSPADM

## 2024-09-06 RX ORDER — INSULIN GLARGINE 100 [IU]/ML
18 INJECTION, SOLUTION SUBCUTANEOUS NIGHTLY
Status: DISCONTINUED | OUTPATIENT
Start: 2024-09-06 | End: 2024-09-15 | Stop reason: HOSPADM

## 2024-09-06 RX ORDER — PANTOPRAZOLE SODIUM 40 MG/1
40 TABLET, DELAYED RELEASE ORAL
Status: DISCONTINUED | OUTPATIENT
Start: 2024-09-07 | End: 2024-09-15 | Stop reason: HOSPADM

## 2024-09-06 RX ADMIN — MIDODRINE HYDROCHLORIDE 5 MG: 5 TABLET ORAL at 12:17

## 2024-09-06 RX ADMIN — DICLOFENAC SODIUM 1 G: 10 GEL TOPICAL at 23:22

## 2024-09-06 RX ADMIN — HUMAN INSULIN 30 UNITS: 100 INJECTION, SUSPENSION SUBCUTANEOUS at 10:05

## 2024-09-06 RX ADMIN — SODIUM CHLORIDE 1250 MG: 9 INJECTION, SOLUTION INTRAVENOUS at 12:32

## 2024-09-06 RX ADMIN — INSULIN LISPRO 12 UNITS: 100 INJECTION, SOLUTION INTRAVENOUS; SUBCUTANEOUS at 12:20

## 2024-09-06 RX ADMIN — GABAPENTIN 200 MG: 100 CAPSULE ORAL at 21:15

## 2024-09-06 RX ADMIN — MIDODRINE HYDROCHLORIDE 5 MG: 5 TABLET ORAL at 09:59

## 2024-09-06 RX ADMIN — MEROPENEM 1000 MG: 1 INJECTION INTRAVENOUS at 21:24

## 2024-09-06 RX ADMIN — INSULIN LISPRO 6 UNITS: 100 INJECTION, SOLUTION INTRAVENOUS; SUBCUTANEOUS at 12:28

## 2024-09-06 RX ADMIN — INSULIN LISPRO 6 UNITS: 100 INJECTION, SOLUTION INTRAVENOUS; SUBCUTANEOUS at 18:57

## 2024-09-06 RX ADMIN — ACETAMINOPHEN 650 MG: 325 TABLET ORAL at 15:14

## 2024-09-06 RX ADMIN — INSULIN LISPRO 8 UNITS: 100 INJECTION, SOLUTION INTRAVENOUS; SUBCUTANEOUS at 18:57

## 2024-09-06 RX ADMIN — ROSUVASTATIN CALCIUM 20 MG: 10 TABLET, COATED ORAL at 21:15

## 2024-09-06 RX ADMIN — GABAPENTIN 100 MG: 100 CAPSULE ORAL at 14:25

## 2024-09-06 RX ADMIN — INSULIN LISPRO 5 UNITS: 100 INJECTION, SOLUTION INTRAVENOUS; SUBCUTANEOUS at 10:18

## 2024-09-06 RX ADMIN — PREDNISONE 60 MG: 20 TABLET ORAL at 10:00

## 2024-09-06 RX ADMIN — SODIUM CHLORIDE, PRESERVATIVE FREE 10 ML: 5 INJECTION INTRAVENOUS at 22:03

## 2024-09-06 RX ADMIN — EZETIMIBE 10 MG: 10 TABLET ORAL at 21:15

## 2024-09-06 RX ADMIN — HYDROCORTISONE SODIUM SUCCINATE 50 MG: 100 INJECTION, POWDER, FOR SOLUTION INTRAMUSCULAR; INTRAVENOUS at 04:04

## 2024-09-06 RX ADMIN — SODIUM CHLORIDE, PRESERVATIVE FREE 10 ML: 5 INJECTION INTRAVENOUS at 10:05

## 2024-09-06 RX ADMIN — ESCITALOPRAM OXALATE 10 MG: 10 TABLET ORAL at 10:01

## 2024-09-06 RX ADMIN — SODIUM CHLORIDE, POTASSIUM CHLORIDE, SODIUM LACTATE AND CALCIUM CHLORIDE: 600; 310; 30; 20 INJECTION, SOLUTION INTRAVENOUS at 05:28

## 2024-09-06 RX ADMIN — INSULIN LISPRO 4 UNITS: 100 INJECTION, SOLUTION INTRAVENOUS; SUBCUTANEOUS at 10:18

## 2024-09-06 RX ADMIN — MEROPENEM 1000 MG: 1 INJECTION INTRAVENOUS at 12:19

## 2024-09-06 RX ADMIN — PANTOPRAZOLE SODIUM 40 MG: 40 INJECTION, POWDER, FOR SOLUTION INTRAVENOUS at 10:04

## 2024-09-06 RX ADMIN — GABAPENTIN 100 MG: 100 CAPSULE ORAL at 10:00

## 2024-09-06 RX ADMIN — INSULIN GLARGINE 18 UNITS: 100 INJECTION, SOLUTION SUBCUTANEOUS at 21:36

## 2024-09-06 ASSESSMENT — PAIN SCALES - GENERAL
PAINLEVEL_OUTOF10: 4
PAINLEVEL_OUTOF10: 2
PAINLEVEL_OUTOF10: 1

## 2024-09-06 ASSESSMENT — PAIN DESCRIPTION - LOCATION: LOCATION: BACK

## 2024-09-06 ASSESSMENT — PAIN DESCRIPTION - DESCRIPTORS: DESCRIPTORS: ACHING

## 2024-09-06 ASSESSMENT — PAIN DESCRIPTION - ORIENTATION: ORIENTATION: RIGHT

## 2024-09-07 LAB
ALBUMIN SERPL-MCNC: 2.7 G/DL (ref 3.5–5)
ALBUMIN/GLOB SERPL: 1 (ref 1.1–2.2)
ALP SERPL-CCNC: 131 U/L (ref 45–117)
ALT SERPL-CCNC: 63 U/L (ref 12–78)
ANION GAP SERPL CALC-SCNC: 4 MMOL/L (ref 2–12)
AST SERPL-CCNC: 38 U/L (ref 15–37)
B PERT DNA SPEC QL NAA+PROBE: NOT DETECTED
BACTERIA SPEC CULT: ABNORMAL
BACTERIA SPEC CULT: ABNORMAL
BASOPHILS # BLD: 0 K/UL (ref 0–0.1)
BASOPHILS NFR BLD: 0 % (ref 0–1)
BILIRUB SERPL-MCNC: 0.6 MG/DL (ref 0.2–1)
BORDETELLA PARAPERTUSSIS BY PCR: NOT DETECTED
BUN SERPL-MCNC: 23 MG/DL (ref 6–20)
BUN/CREAT SERPL: 39 (ref 12–20)
C PNEUM DNA SPEC QL NAA+PROBE: NOT DETECTED
CALCIUM SERPL-MCNC: 8.6 MG/DL (ref 8.5–10.1)
CC UR VC: ABNORMAL
CHLORIDE SERPL-SCNC: 109 MMOL/L (ref 97–108)
CO2 SERPL-SCNC: 29 MMOL/L (ref 21–32)
CREAT SERPL-MCNC: 0.57 MG/DL (ref 0.55–1.02)
CREAT SERPL-MCNC: 0.59 MG/DL (ref 0.55–1.02)
DIFFERENTIAL METHOD BLD: ABNORMAL
EOSINOPHIL # BLD: 0.1 K/UL (ref 0–0.4)
EOSINOPHIL NFR BLD: 1 % (ref 0–7)
ERYTHROCYTE [DISTWIDTH] IN BLOOD BY AUTOMATED COUNT: 14.1 % (ref 11.5–14.5)
FLUAV SUBTYP SPEC NAA+PROBE: NOT DETECTED
FLUBV RNA SPEC QL NAA+PROBE: NOT DETECTED
GLOBULIN SER CALC-MCNC: 2.7 G/DL (ref 2–4)
GLUCOSE BLD STRIP.AUTO-MCNC: 109 MG/DL (ref 65–117)
GLUCOSE BLD STRIP.AUTO-MCNC: 192 MG/DL (ref 65–117)
GLUCOSE BLD STRIP.AUTO-MCNC: 257 MG/DL (ref 65–117)
GLUCOSE BLD STRIP.AUTO-MCNC: 258 MG/DL (ref 65–117)
GLUCOSE SERPL-MCNC: 103 MG/DL (ref 65–100)
HADV DNA SPEC QL NAA+PROBE: NOT DETECTED
HCOV 229E RNA SPEC QL NAA+PROBE: NOT DETECTED
HCOV HKU1 RNA SPEC QL NAA+PROBE: NOT DETECTED
HCOV NL63 RNA SPEC QL NAA+PROBE: NOT DETECTED
HCOV OC43 RNA SPEC QL NAA+PROBE: NOT DETECTED
HCT VFR BLD AUTO: 37.3 % (ref 35–47)
HGB BLD-MCNC: 12.5 G/DL (ref 11.5–16)
HMPV RNA SPEC QL NAA+PROBE: NOT DETECTED
HPIV1 RNA SPEC QL NAA+PROBE: NOT DETECTED
HPIV2 RNA SPEC QL NAA+PROBE: NOT DETECTED
HPIV3 RNA SPEC QL NAA+PROBE: NOT DETECTED
HPIV4 RNA SPEC QL NAA+PROBE: NOT DETECTED
IMM GRANULOCYTES # BLD AUTO: 0.1 K/UL (ref 0–0.04)
IMM GRANULOCYTES NFR BLD AUTO: 1 % (ref 0–0.5)
LYMPHOCYTES # BLD: 1.5 K/UL (ref 0.8–3.5)
LYMPHOCYTES NFR BLD: 25 % (ref 12–49)
M PNEUMO DNA SPEC QL NAA+PROBE: NOT DETECTED
MCH RBC QN AUTO: 34.7 PG (ref 26–34)
MCHC RBC AUTO-ENTMCNC: 33.5 G/DL (ref 30–36.5)
MCV RBC AUTO: 103.6 FL (ref 80–99)
MONOCYTES # BLD: 0.6 K/UL (ref 0–1)
MONOCYTES NFR BLD: 9 % (ref 5–13)
NEUTS SEG # BLD: 3.8 K/UL (ref 1.8–8)
NEUTS SEG NFR BLD: 64 % (ref 32–75)
NRBC # BLD: 0 K/UL (ref 0–0.01)
NRBC BLD-RTO: 0 PER 100 WBC
PLATELET # BLD AUTO: 203 K/UL (ref 150–400)
PMV BLD AUTO: 10.2 FL (ref 8.9–12.9)
POTASSIUM SERPL-SCNC: 3.9 MMOL/L (ref 3.5–5.1)
PROT SERPL-MCNC: 5.4 G/DL (ref 6.4–8.2)
RBC # BLD AUTO: 3.6 M/UL (ref 3.8–5.2)
RSV RNA SPEC QL NAA+PROBE: NOT DETECTED
RV+EV RNA SPEC QL NAA+PROBE: NOT DETECTED
SARS-COV-2 RNA RESP QL NAA+PROBE: NOT DETECTED
SERVICE CMNT-IMP: ABNORMAL
SERVICE CMNT-IMP: NORMAL
SODIUM SERPL-SCNC: 142 MMOL/L (ref 136–145)
VANCOMYCIN SERPL-MCNC: 11.7 UG/ML
WBC # BLD AUTO: 6.1 K/UL (ref 3.6–11)

## 2024-09-07 PROCEDURE — 6370000000 HC RX 637 (ALT 250 FOR IP): Performed by: STUDENT IN AN ORGANIZED HEALTH CARE EDUCATION/TRAINING PROGRAM

## 2024-09-07 PROCEDURE — 80202 ASSAY OF VANCOMYCIN: CPT

## 2024-09-07 PROCEDURE — 87449 NOS EACH ORGANISM AG IA: CPT

## 2024-09-07 PROCEDURE — 2580000003 HC RX 258: Performed by: STUDENT IN AN ORGANIZED HEALTH CARE EDUCATION/TRAINING PROGRAM

## 2024-09-07 PROCEDURE — 6370000000 HC RX 637 (ALT 250 FOR IP)

## 2024-09-07 PROCEDURE — 94761 N-INVAS EAR/PLS OXIMETRY MLT: CPT

## 2024-09-07 PROCEDURE — 6370000000 HC RX 637 (ALT 250 FOR IP): Performed by: FAMILY MEDICINE

## 2024-09-07 PROCEDURE — 82962 GLUCOSE BLOOD TEST: CPT

## 2024-09-07 PROCEDURE — 85025 COMPLETE CBC W/AUTO DIFF WBC: CPT

## 2024-09-07 PROCEDURE — 80053 COMPREHEN METABOLIC PANEL: CPT

## 2024-09-07 PROCEDURE — 6360000002 HC RX W HCPCS: Performed by: STUDENT IN AN ORGANIZED HEALTH CARE EDUCATION/TRAINING PROGRAM

## 2024-09-07 PROCEDURE — 36415 COLL VENOUS BLD VENIPUNCTURE: CPT

## 2024-09-07 PROCEDURE — 2580000003 HC RX 258: Performed by: FAMILY MEDICINE

## 2024-09-07 PROCEDURE — 87899 AGENT NOS ASSAY W/OPTIC: CPT

## 2024-09-07 PROCEDURE — 6360000002 HC RX W HCPCS: Performed by: FAMILY MEDICINE

## 2024-09-07 PROCEDURE — 1100000000 HC RM PRIVATE

## 2024-09-07 RX ADMIN — HUMAN INSULIN 30 UNITS: 100 INJECTION, SUSPENSION SUBCUTANEOUS at 08:59

## 2024-09-07 RX ADMIN — INSULIN LISPRO 6 UNITS: 100 INJECTION, SOLUTION INTRAVENOUS; SUBCUTANEOUS at 14:10

## 2024-09-07 RX ADMIN — PANTOPRAZOLE SODIUM 40 MG: 40 TABLET, DELAYED RELEASE ORAL at 06:17

## 2024-09-07 RX ADMIN — INSULIN LISPRO 8 UNITS: 100 INJECTION, SOLUTION INTRAVENOUS; SUBCUTANEOUS at 14:11

## 2024-09-07 RX ADMIN — INSULIN GLARGINE 18 UNITS: 100 INJECTION, SOLUTION SUBCUTANEOUS at 21:04

## 2024-09-07 RX ADMIN — GABAPENTIN 100 MG: 100 CAPSULE ORAL at 14:12

## 2024-09-07 RX ADMIN — DICLOFENAC SODIUM 1 G: 10 GEL TOPICAL at 09:01

## 2024-09-07 RX ADMIN — ESCITALOPRAM OXALATE 10 MG: 10 TABLET ORAL at 09:00

## 2024-09-07 RX ADMIN — EZETIMIBE 10 MG: 10 TABLET ORAL at 20:49

## 2024-09-07 RX ADMIN — INSULIN LISPRO 6 UNITS: 100 INJECTION, SOLUTION INTRAVENOUS; SUBCUTANEOUS at 18:01

## 2024-09-07 RX ADMIN — ROSUVASTATIN CALCIUM 20 MG: 10 TABLET, COATED ORAL at 20:49

## 2024-09-07 RX ADMIN — INSULIN LISPRO 6 UNITS: 100 INJECTION, SOLUTION INTRAVENOUS; SUBCUTANEOUS at 08:59

## 2024-09-07 RX ADMIN — INSULIN LISPRO 8 UNITS: 100 INJECTION, SOLUTION INTRAVENOUS; SUBCUTANEOUS at 18:02

## 2024-09-07 RX ADMIN — PREDNISONE 60 MG: 20 TABLET ORAL at 09:00

## 2024-09-07 RX ADMIN — SODIUM CHLORIDE, PRESERVATIVE FREE 5 ML: 5 INJECTION INTRAVENOUS at 09:00

## 2024-09-07 RX ADMIN — MEROPENEM 1000 MG: 1 INJECTION INTRAVENOUS at 15:49

## 2024-09-07 RX ADMIN — GABAPENTIN 200 MG: 100 CAPSULE ORAL at 20:49

## 2024-09-07 RX ADMIN — SODIUM CHLORIDE 1500 MG: 9 INJECTION, SOLUTION INTRAVENOUS at 15:59

## 2024-09-07 RX ADMIN — MEROPENEM 1000 MG: 1 INJECTION INTRAVENOUS at 03:41

## 2024-09-07 RX ADMIN — GABAPENTIN 100 MG: 100 CAPSULE ORAL at 09:00

## 2024-09-07 RX ADMIN — SODIUM CHLORIDE, PRESERVATIVE FREE 10 ML: 5 INJECTION INTRAVENOUS at 20:51

## 2024-09-07 RX ADMIN — DICLOFENAC SODIUM 1 G: 10 GEL TOPICAL at 20:50

## 2024-09-07 ASSESSMENT — PAIN SCALES - GENERAL
PAINLEVEL_OUTOF10: 5
PAINLEVEL_OUTOF10: 3

## 2024-09-07 ASSESSMENT — PAIN DESCRIPTION - LOCATION: LOCATION: LEG

## 2024-09-07 ASSESSMENT — PAIN - FUNCTIONAL ASSESSMENT: PAIN_FUNCTIONAL_ASSESSMENT: ACTIVITIES ARE NOT PREVENTED

## 2024-09-07 ASSESSMENT — PAIN DESCRIPTION - ORIENTATION: ORIENTATION: MID

## 2024-09-07 ASSESSMENT — PAIN DESCRIPTION - DESCRIPTORS: DESCRIPTORS: ACHING;DISCOMFORT

## 2024-09-08 LAB
ALBUMIN SERPL-MCNC: 2.7 G/DL (ref 3.5–5)
ALBUMIN/GLOB SERPL: 0.9 (ref 1.1–2.2)
ALP SERPL-CCNC: 135 U/L (ref 45–117)
ALT SERPL-CCNC: 61 U/L (ref 12–78)
ANION GAP SERPL CALC-SCNC: 2 MMOL/L (ref 2–12)
AST SERPL-CCNC: 37 U/L (ref 15–37)
BILIRUB SERPL-MCNC: 0.6 MG/DL (ref 0.2–1)
BUN SERPL-MCNC: 24 MG/DL (ref 6–20)
BUN/CREAT SERPL: 39 (ref 12–20)
CALCIUM SERPL-MCNC: 8.5 MG/DL (ref 8.5–10.1)
CHLORIDE SERPL-SCNC: 111 MMOL/L (ref 97–108)
CO2 SERPL-SCNC: 29 MMOL/L (ref 21–32)
CREAT SERPL-MCNC: 0.62 MG/DL (ref 0.55–1.02)
GLOBULIN SER CALC-MCNC: 3 G/DL (ref 2–4)
GLUCOSE BLD STRIP.AUTO-MCNC: 129 MG/DL (ref 65–117)
GLUCOSE BLD STRIP.AUTO-MCNC: 240 MG/DL (ref 65–117)
GLUCOSE BLD STRIP.AUTO-MCNC: 325 MG/DL (ref 65–117)
GLUCOSE BLD STRIP.AUTO-MCNC: 90 MG/DL (ref 65–117)
GLUCOSE SERPL-MCNC: 104 MG/DL (ref 65–100)
POTASSIUM SERPL-SCNC: 3.7 MMOL/L (ref 3.5–5.1)
PROT SERPL-MCNC: 5.7 G/DL (ref 6.4–8.2)
SERVICE CMNT-IMP: ABNORMAL
SERVICE CMNT-IMP: NORMAL
SODIUM SERPL-SCNC: 142 MMOL/L (ref 136–145)

## 2024-09-08 PROCEDURE — 6360000002 HC RX W HCPCS: Performed by: STUDENT IN AN ORGANIZED HEALTH CARE EDUCATION/TRAINING PROGRAM

## 2024-09-08 PROCEDURE — 80053 COMPREHEN METABOLIC PANEL: CPT

## 2024-09-08 PROCEDURE — 6370000000 HC RX 637 (ALT 250 FOR IP): Performed by: FAMILY MEDICINE

## 2024-09-08 PROCEDURE — 82962 GLUCOSE BLOOD TEST: CPT

## 2024-09-08 PROCEDURE — 36415 COLL VENOUS BLD VENIPUNCTURE: CPT

## 2024-09-08 PROCEDURE — 6370000000 HC RX 637 (ALT 250 FOR IP): Performed by: STUDENT IN AN ORGANIZED HEALTH CARE EDUCATION/TRAINING PROGRAM

## 2024-09-08 PROCEDURE — 6370000000 HC RX 637 (ALT 250 FOR IP)

## 2024-09-08 PROCEDURE — 2580000003 HC RX 258: Performed by: STUDENT IN AN ORGANIZED HEALTH CARE EDUCATION/TRAINING PROGRAM

## 2024-09-08 PROCEDURE — 1100000000 HC RM PRIVATE

## 2024-09-08 PROCEDURE — 94761 N-INVAS EAR/PLS OXIMETRY MLT: CPT

## 2024-09-08 RX ADMIN — ESCITALOPRAM OXALATE 10 MG: 10 TABLET ORAL at 08:54

## 2024-09-08 RX ADMIN — GABAPENTIN 200 MG: 100 CAPSULE ORAL at 21:15

## 2024-09-08 RX ADMIN — HUMAN INSULIN 30 UNITS: 100 INJECTION, SUSPENSION SUBCUTANEOUS at 08:52

## 2024-09-08 RX ADMIN — INSULIN LISPRO 6 UNITS: 100 INJECTION, SOLUTION INTRAVENOUS; SUBCUTANEOUS at 08:52

## 2024-09-08 RX ADMIN — SODIUM CHLORIDE, PRESERVATIVE FREE 10 ML: 5 INJECTION INTRAVENOUS at 21:18

## 2024-09-08 RX ADMIN — ROSUVASTATIN CALCIUM 20 MG: 10 TABLET, COATED ORAL at 21:15

## 2024-09-08 RX ADMIN — INSULIN GLARGINE 18 UNITS: 100 INJECTION, SOLUTION SUBCUTANEOUS at 23:03

## 2024-09-08 RX ADMIN — GABAPENTIN 100 MG: 100 CAPSULE ORAL at 08:53

## 2024-09-08 RX ADMIN — PANTOPRAZOLE SODIUM 40 MG: 40 TABLET, DELAYED RELEASE ORAL at 06:05

## 2024-09-08 RX ADMIN — SODIUM CHLORIDE, PRESERVATIVE FREE 10 ML: 5 INJECTION INTRAVENOUS at 08:54

## 2024-09-08 RX ADMIN — DICLOFENAC SODIUM 1 G: 10 GEL TOPICAL at 21:23

## 2024-09-08 RX ADMIN — INSULIN LISPRO 4 UNITS: 100 INJECTION, SOLUTION INTRAVENOUS; SUBCUTANEOUS at 23:02

## 2024-09-08 RX ADMIN — MEROPENEM 1000 MG: 1 INJECTION INTRAVENOUS at 16:42

## 2024-09-08 RX ADMIN — ACETAMINOPHEN 650 MG: 325 TABLET ORAL at 21:15

## 2024-09-08 RX ADMIN — DICLOFENAC SODIUM 1 G: 10 GEL TOPICAL at 08:54

## 2024-09-08 RX ADMIN — PREDNISONE 60 MG: 20 TABLET ORAL at 08:53

## 2024-09-08 RX ADMIN — GABAPENTIN 100 MG: 100 CAPSULE ORAL at 13:47

## 2024-09-08 RX ADMIN — INSULIN LISPRO 4 UNITS: 100 INJECTION, SOLUTION INTRAVENOUS; SUBCUTANEOUS at 17:51

## 2024-09-08 RX ADMIN — INSULIN LISPRO 6 UNITS: 100 INJECTION, SOLUTION INTRAVENOUS; SUBCUTANEOUS at 12:29

## 2024-09-08 RX ADMIN — MEROPENEM 1000 MG: 1 INJECTION INTRAVENOUS at 00:07

## 2024-09-08 RX ADMIN — INSULIN LISPRO 6 UNITS: 100 INJECTION, SOLUTION INTRAVENOUS; SUBCUTANEOUS at 17:52

## 2024-09-08 RX ADMIN — MEROPENEM 1000 MG: 1 INJECTION INTRAVENOUS at 08:49

## 2024-09-08 RX ADMIN — EZETIMIBE 10 MG: 10 TABLET ORAL at 21:16

## 2024-09-08 ASSESSMENT — PAIN DESCRIPTION - LOCATION: LOCATION: LEG

## 2024-09-08 ASSESSMENT — PAIN SCALES - GENERAL
PAINLEVEL_OUTOF10: 0
PAINLEVEL_OUTOF10: 1
PAINLEVEL_OUTOF10: 1
PAINLEVEL_OUTOF10: 4
PAINLEVEL_OUTOF10: 1

## 2024-09-08 ASSESSMENT — PAIN DESCRIPTION - ORIENTATION: ORIENTATION: RIGHT;LEFT

## 2024-09-08 ASSESSMENT — PAIN DESCRIPTION - DESCRIPTORS: DESCRIPTORS: ACHING

## 2024-09-09 LAB
ALBUMIN SERPL-MCNC: 2.5 G/DL (ref 3.5–5)
ALBUMIN/GLOB SERPL: 0.8 (ref 1.1–2.2)
ALP SERPL-CCNC: 161 U/L (ref 45–117)
ALT SERPL-CCNC: 72 U/L (ref 12–78)
ANION GAP SERPL CALC-SCNC: 5 MMOL/L (ref 2–12)
AST SERPL-CCNC: 49 U/L (ref 15–37)
BASOPHILS # BLD: 0 K/UL (ref 0–0.1)
BASOPHILS NFR BLD: 0 % (ref 0–1)
BILIRUB SERPL-MCNC: 0.5 MG/DL (ref 0.2–1)
BUN SERPL-MCNC: 22 MG/DL (ref 6–20)
BUN/CREAT SERPL: 39 (ref 12–20)
CALCIUM SERPL-MCNC: 8.5 MG/DL (ref 8.5–10.1)
CHLORIDE SERPL-SCNC: 113 MMOL/L (ref 97–108)
CO2 SERPL-SCNC: 23 MMOL/L (ref 21–32)
CREAT SERPL-MCNC: 0.56 MG/DL (ref 0.55–1.02)
DIFFERENTIAL METHOD BLD: ABNORMAL
EOSINOPHIL # BLD: 0 K/UL (ref 0–0.4)
EOSINOPHIL NFR BLD: 0 % (ref 0–7)
ERYTHROCYTE [DISTWIDTH] IN BLOOD BY AUTOMATED COUNT: 13.9 % (ref 11.5–14.5)
FLUID CULTURE: NORMAL
GLOBULIN SER CALC-MCNC: 3.2 G/DL (ref 2–4)
GLUCOSE BLD STRIP.AUTO-MCNC: 118 MG/DL (ref 65–117)
GLUCOSE BLD STRIP.AUTO-MCNC: 119 MG/DL (ref 65–117)
GLUCOSE BLD STRIP.AUTO-MCNC: 171 MG/DL (ref 65–117)
GLUCOSE BLD STRIP.AUTO-MCNC: 185 MG/DL (ref 65–117)
GLUCOSE SERPL-MCNC: 136 MG/DL (ref 65–100)
HCT VFR BLD AUTO: 38.3 % (ref 35–47)
HGB BLD-MCNC: 13.2 G/DL (ref 11.5–16)
IMM GRANULOCYTES # BLD AUTO: 0.1 K/UL (ref 0–0.04)
IMM GRANULOCYTES NFR BLD AUTO: 1 % (ref 0–0.5)
L PNEUMO1 AG UR QL IA: NEGATIVE
LYMPHOCYTES # BLD: 1.6 K/UL (ref 0.8–3.5)
LYMPHOCYTES NFR BLD: 19 % (ref 12–49)
Lab: NORMAL
MCH RBC QN AUTO: 35.1 PG (ref 26–34)
MCHC RBC AUTO-ENTMCNC: 34.5 G/DL (ref 30–36.5)
MCV RBC AUTO: 101.9 FL (ref 80–99)
MONOCYTES # BLD: 0.7 K/UL (ref 0–1)
MONOCYTES NFR BLD: 9 % (ref 5–13)
NEUTS SEG # BLD: 5.8 K/UL (ref 1.8–8)
NEUTS SEG NFR BLD: 71 % (ref 32–75)
NRBC # BLD: 0 K/UL (ref 0–0.01)
NRBC BLD-RTO: 0 PER 100 WBC
ORGANISM ID: NORMAL
PLATELET # BLD AUTO: 198 K/UL (ref 150–400)
PMV BLD AUTO: 10.1 FL (ref 8.9–12.9)
POTASSIUM SERPL-SCNC: 4.1 MMOL/L (ref 3.5–5.1)
PROT SERPL-MCNC: 5.7 G/DL (ref 6.4–8.2)
RBC # BLD AUTO: 3.76 M/UL (ref 3.8–5.2)
S PNEUM AG SPEC QL LA: NEGATIVE
SERVICE CMNT-IMP: ABNORMAL
SODIUM SERPL-SCNC: 141 MMOL/L (ref 136–145)
SPECIMEN SOURCE: NORMAL
SPECIMEN SOURCE: NORMAL
SPECIMEN: NORMAL
VANCOMYCIN SERPL-MCNC: 7 UG/ML
WBC # BLD AUTO: 8.3 K/UL (ref 3.6–11)

## 2024-09-09 PROCEDURE — 6360000002 HC RX W HCPCS: Performed by: STUDENT IN AN ORGANIZED HEALTH CARE EDUCATION/TRAINING PROGRAM

## 2024-09-09 PROCEDURE — 82962 GLUCOSE BLOOD TEST: CPT

## 2024-09-09 PROCEDURE — 6370000000 HC RX 637 (ALT 250 FOR IP): Performed by: INTERNAL MEDICINE

## 2024-09-09 PROCEDURE — 94761 N-INVAS EAR/PLS OXIMETRY MLT: CPT

## 2024-09-09 PROCEDURE — 97110 THERAPEUTIC EXERCISES: CPT

## 2024-09-09 PROCEDURE — 85025 COMPLETE CBC W/AUTO DIFF WBC: CPT

## 2024-09-09 PROCEDURE — 80053 COMPREHEN METABOLIC PANEL: CPT

## 2024-09-09 PROCEDURE — 6370000000 HC RX 637 (ALT 250 FOR IP): Performed by: STUDENT IN AN ORGANIZED HEALTH CARE EDUCATION/TRAINING PROGRAM

## 2024-09-09 PROCEDURE — 80202 ASSAY OF VANCOMYCIN: CPT

## 2024-09-09 PROCEDURE — 1100000000 HC RM PRIVATE

## 2024-09-09 PROCEDURE — 99232 SBSQ HOSP IP/OBS MODERATE 35: CPT | Performed by: INTERNAL MEDICINE

## 2024-09-09 PROCEDURE — 6370000000 HC RX 637 (ALT 250 FOR IP): Performed by: FAMILY MEDICINE

## 2024-09-09 PROCEDURE — 6370000000 HC RX 637 (ALT 250 FOR IP)

## 2024-09-09 PROCEDURE — 2580000003 HC RX 258: Performed by: STUDENT IN AN ORGANIZED HEALTH CARE EDUCATION/TRAINING PROGRAM

## 2024-09-09 PROCEDURE — 36415 COLL VENOUS BLD VENIPUNCTURE: CPT

## 2024-09-09 RX ORDER — LISINOPRIL 5 MG/1
2.5 TABLET ORAL DAILY
Status: DISCONTINUED | OUTPATIENT
Start: 2024-09-09 | End: 2024-09-15 | Stop reason: HOSPADM

## 2024-09-09 RX ADMIN — PANTOPRAZOLE SODIUM 40 MG: 40 TABLET, DELAYED RELEASE ORAL at 06:18

## 2024-09-09 RX ADMIN — SODIUM CHLORIDE, PRESERVATIVE FREE 10 ML: 5 INJECTION INTRAVENOUS at 08:38

## 2024-09-09 RX ADMIN — GABAPENTIN 200 MG: 100 CAPSULE ORAL at 20:15

## 2024-09-09 RX ADMIN — MEROPENEM 1000 MG: 1 INJECTION INTRAVENOUS at 08:35

## 2024-09-09 RX ADMIN — DICLOFENAC SODIUM 1 G: 10 GEL TOPICAL at 20:16

## 2024-09-09 RX ADMIN — GABAPENTIN 100 MG: 100 CAPSULE ORAL at 13:53

## 2024-09-09 RX ADMIN — MEROPENEM 1000 MG: 1 INJECTION INTRAVENOUS at 00:22

## 2024-09-09 RX ADMIN — ROSUVASTATIN CALCIUM 20 MG: 10 TABLET, COATED ORAL at 20:14

## 2024-09-09 RX ADMIN — INSULIN LISPRO 6 UNITS: 100 INJECTION, SOLUTION INTRAVENOUS; SUBCUTANEOUS at 18:36

## 2024-09-09 RX ADMIN — ACETAMINOPHEN 650 MG: 325 TABLET ORAL at 12:37

## 2024-09-09 RX ADMIN — EZETIMIBE 10 MG: 10 TABLET ORAL at 20:14

## 2024-09-09 RX ADMIN — INSULIN HUMAN 15 UNITS: 100 INJECTION, SUSPENSION SUBCUTANEOUS at 09:36

## 2024-09-09 RX ADMIN — INSULIN GLARGINE 18 UNITS: 100 INJECTION, SOLUTION SUBCUTANEOUS at 20:15

## 2024-09-09 RX ADMIN — DICLOFENAC SODIUM 1 G: 10 GEL TOPICAL at 08:39

## 2024-09-09 RX ADMIN — SODIUM CHLORIDE, PRESERVATIVE FREE 10 ML: 5 INJECTION INTRAVENOUS at 20:16

## 2024-09-09 RX ADMIN — CARVEDILOL 12.5 MG: 12.5 TABLET, FILM COATED ORAL at 16:38

## 2024-09-09 RX ADMIN — ESCITALOPRAM OXALATE 10 MG: 10 TABLET ORAL at 08:37

## 2024-09-09 RX ADMIN — MEROPENEM 1000 MG: 1 INJECTION INTRAVENOUS at 17:30

## 2024-09-09 RX ADMIN — PREDNISONE 30 MG: 20 TABLET ORAL at 08:37

## 2024-09-09 RX ADMIN — INSULIN LISPRO 6 UNITS: 100 INJECTION, SOLUTION INTRAVENOUS; SUBCUTANEOUS at 13:53

## 2024-09-09 RX ADMIN — LISINOPRIL 2.5 MG: 5 TABLET ORAL at 18:36

## 2024-09-09 RX ADMIN — INSULIN LISPRO 6 UNITS: 100 INJECTION, SOLUTION INTRAVENOUS; SUBCUTANEOUS at 09:36

## 2024-09-09 RX ADMIN — GABAPENTIN 100 MG: 100 CAPSULE ORAL at 08:37

## 2024-09-09 RX ADMIN — ACETAMINOPHEN 650 MG: 325 TABLET ORAL at 18:44

## 2024-09-09 ASSESSMENT — PAIN DESCRIPTION - DESCRIPTORS
DESCRIPTORS: ACHING
DESCRIPTORS: SHARP
DESCRIPTORS: ACHING

## 2024-09-09 ASSESSMENT — PAIN SCALES - GENERAL
PAINLEVEL_OUTOF10: 6
PAINLEVEL_OUTOF10: 0
PAINLEVEL_OUTOF10: 8

## 2024-09-09 ASSESSMENT — PAIN DESCRIPTION - LOCATION
LOCATION: ARM
LOCATION: ARM

## 2024-09-09 ASSESSMENT — PAIN DESCRIPTION - ORIENTATION
ORIENTATION: RIGHT
ORIENTATION: RIGHT

## 2024-09-09 ASSESSMENT — PAIN - FUNCTIONAL ASSESSMENT
PAIN_FUNCTIONAL_ASSESSMENT: 0-10
PAIN_FUNCTIONAL_ASSESSMENT: 0-10

## 2024-09-10 ENCOUNTER — APPOINTMENT (OUTPATIENT)
Facility: HOSPITAL | Age: 79
DRG: 871 | End: 2024-09-10
Payer: MEDICARE

## 2024-09-10 ENCOUNTER — APPOINTMENT (OUTPATIENT)
Facility: HOSPITAL | Age: 79
DRG: 871 | End: 2024-09-10
Attending: INTERNAL MEDICINE
Payer: MEDICARE

## 2024-09-10 LAB
ALBUMIN SERPL-MCNC: 2.7 G/DL (ref 3.5–5)
ALBUMIN/GLOB SERPL: 0.9 (ref 1.1–2.2)
ALP SERPL-CCNC: 205 U/L (ref 45–117)
ALT SERPL-CCNC: 94 U/L (ref 12–78)
ANION GAP SERPL CALC-SCNC: 5 MMOL/L (ref 2–12)
AST SERPL-CCNC: 67 U/L (ref 15–37)
BASOPHILS # BLD: 0 K/UL (ref 0–0.1)
BASOPHILS NFR BLD: 0 % (ref 0–1)
BILIRUB SERPL-MCNC: 0.6 MG/DL (ref 0.2–1)
BUN SERPL-MCNC: 21 MG/DL (ref 6–20)
BUN/CREAT SERPL: 38 (ref 12–20)
CALCIUM SERPL-MCNC: 8.5 MG/DL (ref 8.5–10.1)
CHLORIDE SERPL-SCNC: 111 MMOL/L (ref 97–108)
CO2 SERPL-SCNC: 24 MMOL/L (ref 21–32)
CREAT SERPL-MCNC: 0.56 MG/DL (ref 0.55–1.02)
DIFFERENTIAL METHOD BLD: ABNORMAL
EOSINOPHIL # BLD: 0.1 K/UL (ref 0–0.4)
EOSINOPHIL NFR BLD: 1 % (ref 0–7)
ERYTHROCYTE [DISTWIDTH] IN BLOOD BY AUTOMATED COUNT: 14 % (ref 11.5–14.5)
GLOBULIN SER CALC-MCNC: 3.1 G/DL (ref 2–4)
GLUCOSE BLD STRIP.AUTO-MCNC: 109 MG/DL (ref 65–117)
GLUCOSE BLD STRIP.AUTO-MCNC: 122 MG/DL (ref 65–117)
GLUCOSE BLD STRIP.AUTO-MCNC: 157 MG/DL (ref 65–117)
GLUCOSE BLD STRIP.AUTO-MCNC: 204 MG/DL (ref 65–117)
GLUCOSE SERPL-MCNC: 107 MG/DL (ref 65–100)
HCT VFR BLD AUTO: 41.7 % (ref 35–47)
HGB BLD-MCNC: 14.3 G/DL (ref 11.5–16)
IMM GRANULOCYTES # BLD AUTO: 0.1 K/UL (ref 0–0.04)
IMM GRANULOCYTES NFR BLD AUTO: 1 % (ref 0–0.5)
LYMPHOCYTES # BLD: 2.1 K/UL (ref 0.8–3.5)
LYMPHOCYTES NFR BLD: 26 % (ref 12–49)
MCH RBC QN AUTO: 35 PG (ref 26–34)
MCHC RBC AUTO-ENTMCNC: 34.3 G/DL (ref 30–36.5)
MCV RBC AUTO: 102.2 FL (ref 80–99)
MONOCYTES # BLD: 0.7 K/UL (ref 0–1)
MONOCYTES NFR BLD: 8 % (ref 5–13)
NEUTS SEG # BLD: 5.1 K/UL (ref 1.8–8)
NEUTS SEG NFR BLD: 64 % (ref 32–75)
NRBC # BLD: 0 K/UL (ref 0–0.01)
NRBC BLD-RTO: 0 PER 100 WBC
PLATELET # BLD AUTO: 214 K/UL (ref 150–400)
PMV BLD AUTO: 9.8 FL (ref 8.9–12.9)
POTASSIUM SERPL-SCNC: 3.8 MMOL/L (ref 3.5–5.1)
PROT SERPL-MCNC: 5.8 G/DL (ref 6.4–8.2)
RBC # BLD AUTO: 4.08 M/UL (ref 3.8–5.2)
SERVICE CMNT-IMP: ABNORMAL
SERVICE CMNT-IMP: NORMAL
SODIUM SERPL-SCNC: 140 MMOL/L (ref 136–145)
WBC # BLD AUTO: 8 K/UL (ref 3.6–11)

## 2024-09-10 PROCEDURE — 2580000003 HC RX 258: Performed by: STUDENT IN AN ORGANIZED HEALTH CARE EDUCATION/TRAINING PROGRAM

## 2024-09-10 PROCEDURE — 6360000002 HC RX W HCPCS: Performed by: STUDENT IN AN ORGANIZED HEALTH CARE EDUCATION/TRAINING PROGRAM

## 2024-09-10 PROCEDURE — 36415 COLL VENOUS BLD VENIPUNCTURE: CPT

## 2024-09-10 PROCEDURE — 6370000000 HC RX 637 (ALT 250 FOR IP): Performed by: INTERNAL MEDICINE

## 2024-09-10 PROCEDURE — 94761 N-INVAS EAR/PLS OXIMETRY MLT: CPT

## 2024-09-10 PROCEDURE — 97530 THERAPEUTIC ACTIVITIES: CPT

## 2024-09-10 PROCEDURE — 85025 COMPLETE CBC W/AUTO DIFF WBC: CPT

## 2024-09-10 PROCEDURE — 82962 GLUCOSE BLOOD TEST: CPT

## 2024-09-10 PROCEDURE — 6370000000 HC RX 637 (ALT 250 FOR IP)

## 2024-09-10 PROCEDURE — 80053 COMPREHEN METABOLIC PANEL: CPT

## 2024-09-10 PROCEDURE — 1100000000 HC RM PRIVATE

## 2024-09-10 PROCEDURE — 6370000000 HC RX 637 (ALT 250 FOR IP): Performed by: STUDENT IN AN ORGANIZED HEALTH CARE EDUCATION/TRAINING PROGRAM

## 2024-09-10 PROCEDURE — 6370000000 HC RX 637 (ALT 250 FOR IP): Performed by: FAMILY MEDICINE

## 2024-09-10 RX ADMIN — INSULIN GLARGINE 18 UNITS: 100 INJECTION, SOLUTION SUBCUTANEOUS at 21:37

## 2024-09-10 RX ADMIN — ESCITALOPRAM OXALATE 10 MG: 10 TABLET ORAL at 10:22

## 2024-09-10 RX ADMIN — MEROPENEM 1000 MG: 1 INJECTION INTRAVENOUS at 23:41

## 2024-09-10 RX ADMIN — GABAPENTIN 100 MG: 100 CAPSULE ORAL at 10:23

## 2024-09-10 RX ADMIN — MEROPENEM 1000 MG: 1 INJECTION INTRAVENOUS at 10:26

## 2024-09-10 RX ADMIN — INSULIN LISPRO 6 UNITS: 100 INJECTION, SOLUTION INTRAVENOUS; SUBCUTANEOUS at 10:23

## 2024-09-10 RX ADMIN — EZETIMIBE 10 MG: 10 TABLET ORAL at 21:26

## 2024-09-10 RX ADMIN — LISINOPRIL 2.5 MG: 5 TABLET ORAL at 10:29

## 2024-09-10 RX ADMIN — CARVEDILOL 12.5 MG: 12.5 TABLET, FILM COATED ORAL at 18:52

## 2024-09-10 RX ADMIN — PANTOPRAZOLE SODIUM 40 MG: 40 TABLET, DELAYED RELEASE ORAL at 06:26

## 2024-09-10 RX ADMIN — INSULIN LISPRO 6 UNITS: 100 INJECTION, SOLUTION INTRAVENOUS; SUBCUTANEOUS at 12:51

## 2024-09-10 RX ADMIN — INSULIN HUMAN 15 UNITS: 100 INJECTION, SUSPENSION SUBCUTANEOUS at 10:23

## 2024-09-10 RX ADMIN — PREDNISONE 30 MG: 20 TABLET ORAL at 10:22

## 2024-09-10 RX ADMIN — MEROPENEM 1000 MG: 1 INJECTION INTRAVENOUS at 18:53

## 2024-09-10 RX ADMIN — CARVEDILOL 12.5 MG: 12.5 TABLET, FILM COATED ORAL at 10:22

## 2024-09-10 RX ADMIN — GABAPENTIN 200 MG: 100 CAPSULE ORAL at 21:26

## 2024-09-10 RX ADMIN — MEROPENEM 1000 MG: 1 INJECTION INTRAVENOUS at 01:05

## 2024-09-10 RX ADMIN — DICLOFENAC SODIUM 1 G: 10 GEL TOPICAL at 10:27

## 2024-09-10 RX ADMIN — GABAPENTIN 100 MG: 100 CAPSULE ORAL at 14:07

## 2024-09-10 RX ADMIN — DICLOFENAC SODIUM 1 G: 10 GEL TOPICAL at 21:38

## 2024-09-10 ASSESSMENT — PAIN SCALES - GENERAL
PAINLEVEL_OUTOF10: 0

## 2024-09-11 ENCOUNTER — APPOINTMENT (OUTPATIENT)
Facility: HOSPITAL | Age: 79
DRG: 871 | End: 2024-09-11
Payer: MEDICARE

## 2024-09-11 LAB
-: NORMAL
ALBUMIN SERPL-MCNC: 2.7 G/DL (ref 3.5–5)
ALBUMIN/GLOB SERPL: 0.9 (ref 1.1–2.2)
ALP SERPL-CCNC: 202 U/L (ref 45–117)
ALT SERPL-CCNC: 88 U/L (ref 12–78)
ANION GAP SERPL CALC-SCNC: 3 MMOL/L (ref 2–12)
AST SERPL-CCNC: 46 U/L (ref 15–37)
BACTERIA SPEC CULT: NORMAL
BASOPHILS # BLD: 0 K/UL (ref 0–0.1)
BASOPHILS NFR BLD: 0 % (ref 0–1)
BILIRUB SERPL-MCNC: 0.7 MG/DL (ref 0.2–1)
BUN SERPL-MCNC: 21 MG/DL (ref 6–20)
BUN/CREAT SERPL: 40 (ref 12–20)
CALCIUM SERPL-MCNC: 8.5 MG/DL (ref 8.5–10.1)
CHLORIDE SERPL-SCNC: 111 MMOL/L (ref 97–108)
CO2 SERPL-SCNC: 26 MMOL/L (ref 21–32)
CREAT SERPL-MCNC: 0.53 MG/DL (ref 0.55–1.02)
DIFFERENTIAL METHOD BLD: ABNORMAL
EOSINOPHIL # BLD: 0 K/UL (ref 0–0.4)
EOSINOPHIL NFR BLD: 0 % (ref 0–7)
ERYTHROCYTE [DISTWIDTH] IN BLOOD BY AUTOMATED COUNT: 14.2 % (ref 11.5–14.5)
GLOBULIN SER CALC-MCNC: 3.1 G/DL (ref 2–4)
GLUCOSE BLD STRIP.AUTO-MCNC: 112 MG/DL (ref 65–117)
GLUCOSE BLD STRIP.AUTO-MCNC: 113 MG/DL (ref 65–117)
GLUCOSE BLD STRIP.AUTO-MCNC: 202 MG/DL (ref 65–117)
GLUCOSE BLD STRIP.AUTO-MCNC: 227 MG/DL (ref 65–117)
GLUCOSE SERPL-MCNC: 111 MG/DL (ref 65–100)
HAV IGM SER QL: NONREACTIVE
HBV CORE IGM SER QL: NONREACTIVE
HBV SURFACE AG SER QL: <0.1 INDEX
HBV SURFACE AG SER QL: NEGATIVE
HCT VFR BLD AUTO: 39.9 % (ref 35–47)
HCV AB SER IA-ACNC: 0.04 INDEX
HCV AB SERPL QL IA: NONREACTIVE
HGB BLD-MCNC: 13.7 G/DL (ref 11.5–16)
IMM GRANULOCYTES # BLD AUTO: 0.1 K/UL (ref 0–0.04)
IMM GRANULOCYTES NFR BLD AUTO: 2 % (ref 0–0.5)
LYMPHOCYTES # BLD: 1.7 K/UL (ref 0.8–3.5)
LYMPHOCYTES NFR BLD: 18 % (ref 12–49)
MCH RBC QN AUTO: 34.9 PG (ref 26–34)
MCHC RBC AUTO-ENTMCNC: 34.3 G/DL (ref 30–36.5)
MCV RBC AUTO: 101.5 FL (ref 80–99)
MONOCYTES # BLD: 0.6 K/UL (ref 0–1)
MONOCYTES NFR BLD: 6 % (ref 5–13)
NEUTS SEG # BLD: 7 K/UL (ref 1.8–8)
NEUTS SEG NFR BLD: 74 % (ref 32–75)
NRBC # BLD: 0 K/UL (ref 0–0.01)
NRBC BLD-RTO: 0 PER 100 WBC
PLATELET # BLD AUTO: 239 K/UL (ref 150–400)
PMV BLD AUTO: 9.4 FL (ref 8.9–12.9)
POTASSIUM SERPL-SCNC: 4 MMOL/L (ref 3.5–5.1)
PROT SERPL-MCNC: 5.8 G/DL (ref 6.4–8.2)
RBC # BLD AUTO: 3.93 M/UL (ref 3.8–5.2)
SERVICE CMNT-IMP: ABNORMAL
SERVICE CMNT-IMP: ABNORMAL
SERVICE CMNT-IMP: NORMAL
SODIUM SERPL-SCNC: 140 MMOL/L (ref 136–145)
WBC # BLD AUTO: 9.5 K/UL (ref 3.6–11)

## 2024-09-11 PROCEDURE — 6370000000 HC RX 637 (ALT 250 FOR IP): Performed by: STUDENT IN AN ORGANIZED HEALTH CARE EDUCATION/TRAINING PROGRAM

## 2024-09-11 PROCEDURE — 85025 COMPLETE CBC W/AUTO DIFF WBC: CPT

## 2024-09-11 PROCEDURE — 97535 SELF CARE MNGMENT TRAINING: CPT

## 2024-09-11 PROCEDURE — 76700 US EXAM ABDOM COMPLETE: CPT

## 2024-09-11 PROCEDURE — 6370000000 HC RX 637 (ALT 250 FOR IP): Performed by: INTERNAL MEDICINE

## 2024-09-11 PROCEDURE — 82962 GLUCOSE BLOOD TEST: CPT

## 2024-09-11 PROCEDURE — 80053 COMPREHEN METABOLIC PANEL: CPT

## 2024-09-11 PROCEDURE — 36415 COLL VENOUS BLD VENIPUNCTURE: CPT

## 2024-09-11 PROCEDURE — 80074 ACUTE HEPATITIS PANEL: CPT

## 2024-09-11 PROCEDURE — 6370000000 HC RX 637 (ALT 250 FOR IP)

## 2024-09-11 PROCEDURE — 97116 GAIT TRAINING THERAPY: CPT

## 2024-09-11 PROCEDURE — 94761 N-INVAS EAR/PLS OXIMETRY MLT: CPT

## 2024-09-11 PROCEDURE — 97530 THERAPEUTIC ACTIVITIES: CPT

## 2024-09-11 PROCEDURE — 6370000000 HC RX 637 (ALT 250 FOR IP): Performed by: FAMILY MEDICINE

## 2024-09-11 PROCEDURE — 97110 THERAPEUTIC EXERCISES: CPT

## 2024-09-11 PROCEDURE — 97112 NEUROMUSCULAR REEDUCATION: CPT

## 2024-09-11 PROCEDURE — 6360000002 HC RX W HCPCS: Performed by: STUDENT IN AN ORGANIZED HEALTH CARE EDUCATION/TRAINING PROGRAM

## 2024-09-11 PROCEDURE — 1100000000 HC RM PRIVATE

## 2024-09-11 PROCEDURE — 2580000003 HC RX 258: Performed by: STUDENT IN AN ORGANIZED HEALTH CARE EDUCATION/TRAINING PROGRAM

## 2024-09-11 RX ADMIN — MEROPENEM 1000 MG: 1 INJECTION INTRAVENOUS at 16:31

## 2024-09-11 RX ADMIN — LISINOPRIL 2.5 MG: 5 TABLET ORAL at 10:31

## 2024-09-11 RX ADMIN — EZETIMIBE 10 MG: 10 TABLET ORAL at 20:40

## 2024-09-11 RX ADMIN — DICLOFENAC SODIUM 1 G: 10 GEL TOPICAL at 20:44

## 2024-09-11 RX ADMIN — INSULIN LISPRO 6 UNITS: 100 INJECTION, SOLUTION INTRAVENOUS; SUBCUTANEOUS at 10:35

## 2024-09-11 RX ADMIN — INSULIN HUMAN 15 UNITS: 100 INJECTION, SUSPENSION SUBCUTANEOUS at 10:34

## 2024-09-11 RX ADMIN — MEROPENEM 1000 MG: 1 INJECTION INTRAVENOUS at 23:41

## 2024-09-11 RX ADMIN — ESCITALOPRAM OXALATE 10 MG: 10 TABLET ORAL at 11:12

## 2024-09-11 RX ADMIN — PREDNISONE 30 MG: 20 TABLET ORAL at 10:29

## 2024-09-11 RX ADMIN — MEROPENEM 1000 MG: 1 INJECTION INTRAVENOUS at 11:15

## 2024-09-11 RX ADMIN — CARVEDILOL 12.5 MG: 12.5 TABLET, FILM COATED ORAL at 11:10

## 2024-09-11 RX ADMIN — GABAPENTIN 100 MG: 100 CAPSULE ORAL at 10:31

## 2024-09-11 RX ADMIN — GABAPENTIN 100 MG: 100 CAPSULE ORAL at 16:31

## 2024-09-11 RX ADMIN — PANTOPRAZOLE SODIUM 40 MG: 40 TABLET, DELAYED RELEASE ORAL at 06:13

## 2024-09-11 RX ADMIN — DICLOFENAC SODIUM 1 G: 10 GEL TOPICAL at 10:37

## 2024-09-11 RX ADMIN — CARVEDILOL 12.5 MG: 12.5 TABLET, FILM COATED ORAL at 16:31

## 2024-09-11 RX ADMIN — INSULIN GLARGINE 18 UNITS: 100 INJECTION, SOLUTION SUBCUTANEOUS at 20:43

## 2024-09-11 RX ADMIN — GABAPENTIN 200 MG: 100 CAPSULE ORAL at 20:40

## 2024-09-11 ASSESSMENT — PAIN DESCRIPTION - ORIENTATION: ORIENTATION: OTHER (COMMENT)

## 2024-09-11 ASSESSMENT — PAIN SCALES - GENERAL
PAINLEVEL_OUTOF10: 0
PAINLEVEL_OUTOF10: 8

## 2024-09-11 ASSESSMENT — PAIN DESCRIPTION - DESCRIPTORS: DESCRIPTORS: ACHING

## 2024-09-11 ASSESSMENT — PAIN - FUNCTIONAL ASSESSMENT: PAIN_FUNCTIONAL_ASSESSMENT: PREVENTS OR INTERFERES SOME ACTIVE ACTIVITIES AND ADLS

## 2024-09-11 ASSESSMENT — PAIN DESCRIPTION - LOCATION: LOCATION: LEG

## 2024-09-12 LAB
ALBUMIN SERPL-MCNC: 2.8 G/DL (ref 3.5–5)
ALBUMIN/GLOB SERPL: 0.8 (ref 1.1–2.2)
ALP SERPL-CCNC: 208 U/L (ref 45–117)
ALT SERPL-CCNC: 85 U/L (ref 12–78)
ANION GAP SERPL CALC-SCNC: 4 MMOL/L (ref 2–12)
AST SERPL-CCNC: 41 U/L (ref 15–37)
BILIRUB SERPL-MCNC: 0.8 MG/DL (ref 0.2–1)
BUN SERPL-MCNC: 22 MG/DL (ref 6–20)
BUN/CREAT SERPL: 39 (ref 12–20)
CALCIUM SERPL-MCNC: 8.8 MG/DL (ref 8.5–10.1)
CHLORIDE SERPL-SCNC: 108 MMOL/L (ref 97–108)
CO2 SERPL-SCNC: 28 MMOL/L (ref 21–32)
CREAT SERPL-MCNC: 0.57 MG/DL (ref 0.55–1.02)
GLOBULIN SER CALC-MCNC: 3.4 G/DL (ref 2–4)
GLUCOSE BLD STRIP.AUTO-MCNC: 144 MG/DL (ref 65–117)
GLUCOSE BLD STRIP.AUTO-MCNC: 180 MG/DL (ref 65–117)
GLUCOSE BLD STRIP.AUTO-MCNC: 283 MG/DL (ref 65–117)
GLUCOSE SERPL-MCNC: 158 MG/DL (ref 65–100)
POTASSIUM SERPL-SCNC: 4.1 MMOL/L (ref 3.5–5.1)
PROT SERPL-MCNC: 6.2 G/DL (ref 6.4–8.2)
SERVICE CMNT-IMP: ABNORMAL
SODIUM SERPL-SCNC: 140 MMOL/L (ref 136–145)

## 2024-09-12 PROCEDURE — 6370000000 HC RX 637 (ALT 250 FOR IP): Performed by: FAMILY MEDICINE

## 2024-09-12 PROCEDURE — 80053 COMPREHEN METABOLIC PANEL: CPT

## 2024-09-12 PROCEDURE — 6370000000 HC RX 637 (ALT 250 FOR IP)

## 2024-09-12 PROCEDURE — 6370000000 HC RX 637 (ALT 250 FOR IP): Performed by: NURSE PRACTITIONER

## 2024-09-12 PROCEDURE — 2580000003 HC RX 258: Performed by: INTERNAL MEDICINE

## 2024-09-12 PROCEDURE — 97530 THERAPEUTIC ACTIVITIES: CPT

## 2024-09-12 PROCEDURE — 1100000000 HC RM PRIVATE

## 2024-09-12 PROCEDURE — 6370000000 HC RX 637 (ALT 250 FOR IP): Performed by: INTERNAL MEDICINE

## 2024-09-12 PROCEDURE — 36415 COLL VENOUS BLD VENIPUNCTURE: CPT

## 2024-09-12 PROCEDURE — 6370000000 HC RX 637 (ALT 250 FOR IP): Performed by: STUDENT IN AN ORGANIZED HEALTH CARE EDUCATION/TRAINING PROGRAM

## 2024-09-12 PROCEDURE — 94761 N-INVAS EAR/PLS OXIMETRY MLT: CPT

## 2024-09-12 PROCEDURE — 97110 THERAPEUTIC EXERCISES: CPT

## 2024-09-12 PROCEDURE — 82962 GLUCOSE BLOOD TEST: CPT

## 2024-09-12 PROCEDURE — 6360000002 HC RX W HCPCS: Performed by: INTERNAL MEDICINE

## 2024-09-12 PROCEDURE — 6360000002 HC RX W HCPCS: Performed by: STUDENT IN AN ORGANIZED HEALTH CARE EDUCATION/TRAINING PROGRAM

## 2024-09-12 PROCEDURE — 2580000003 HC RX 258: Performed by: STUDENT IN AN ORGANIZED HEALTH CARE EDUCATION/TRAINING PROGRAM

## 2024-09-12 RX ADMIN — INSULIN GLARGINE 18 UNITS: 100 INJECTION, SOLUTION SUBCUTANEOUS at 21:57

## 2024-09-12 RX ADMIN — SODIUM CHLORIDE, PRESERVATIVE FREE 10 ML: 5 INJECTION INTRAVENOUS at 21:58

## 2024-09-12 RX ADMIN — DICLOFENAC SODIUM 1 G: 10 GEL TOPICAL at 09:59

## 2024-09-12 RX ADMIN — GABAPENTIN 200 MG: 100 CAPSULE ORAL at 21:58

## 2024-09-12 RX ADMIN — GABAPENTIN 100 MG: 100 CAPSULE ORAL at 17:10

## 2024-09-12 RX ADMIN — LISINOPRIL 2.5 MG: 5 TABLET ORAL at 10:01

## 2024-09-12 RX ADMIN — MEROPENEM 1000 MG: 1 INJECTION INTRAVENOUS at 17:15

## 2024-09-12 RX ADMIN — ESCITALOPRAM OXALATE 10 MG: 10 TABLET ORAL at 10:00

## 2024-09-12 RX ADMIN — GABAPENTIN 100 MG: 100 CAPSULE ORAL at 10:00

## 2024-09-12 RX ADMIN — PREDNISONE 30 MG: 20 TABLET ORAL at 10:00

## 2024-09-12 RX ADMIN — ENOXAPARIN SODIUM 40 MG: 100 INJECTION SUBCUTANEOUS at 10:00

## 2024-09-12 RX ADMIN — INSULIN HUMAN 15 UNITS: 100 INJECTION, SUSPENSION SUBCUTANEOUS at 10:02

## 2024-09-12 RX ADMIN — ACETAMINOPHEN 650 MG: 325 TABLET ORAL at 05:16

## 2024-09-12 RX ADMIN — CARVEDILOL 12.5 MG: 12.5 TABLET, FILM COATED ORAL at 10:00

## 2024-09-12 RX ADMIN — EZETIMIBE 10 MG: 10 TABLET ORAL at 21:57

## 2024-09-12 RX ADMIN — CARVEDILOL 12.5 MG: 12.5 TABLET, FILM COATED ORAL at 17:10

## 2024-09-12 RX ADMIN — DICLOFENAC SODIUM 1 G: 10 GEL TOPICAL at 22:54

## 2024-09-12 RX ADMIN — PANTOPRAZOLE SODIUM 40 MG: 40 TABLET, DELAYED RELEASE ORAL at 05:16

## 2024-09-12 ASSESSMENT — PAIN DESCRIPTION - PAIN TYPE: TYPE: CHRONIC PAIN

## 2024-09-12 ASSESSMENT — PAIN SCALES - GENERAL
PAINLEVEL_OUTOF10: 6
PAINLEVEL_OUTOF10: 0
PAINLEVEL_OUTOF10: 0
PAINLEVEL_OUTOF10: 3
PAINLEVEL_OUTOF10: 0
PAINLEVEL_OUTOF10: 0
PAINLEVEL_OUTOF10: 4

## 2024-09-12 ASSESSMENT — PAIN DESCRIPTION - DESCRIPTORS
DESCRIPTORS: BURNING
DESCRIPTORS: ACHING

## 2024-09-12 ASSESSMENT — PAIN DESCRIPTION - ORIENTATION: ORIENTATION: LEFT;RIGHT

## 2024-09-12 ASSESSMENT — PAIN DESCRIPTION - FREQUENCY: FREQUENCY: CONTINUOUS

## 2024-09-12 ASSESSMENT — PAIN DESCRIPTION - ONSET: ONSET: ON-GOING

## 2024-09-12 ASSESSMENT — PAIN DESCRIPTION - LOCATION
LOCATION: BACK
LOCATION: LEG

## 2024-09-12 ASSESSMENT — PAIN - FUNCTIONAL ASSESSMENT: PAIN_FUNCTIONAL_ASSESSMENT: PREVENTS OR INTERFERES SOME ACTIVE ACTIVITIES AND ADLS

## 2024-09-13 LAB
ALBUMIN SERPL-MCNC: 2.9 G/DL (ref 3.5–5)
ALBUMIN/GLOB SERPL: 0.9 (ref 1.1–2.2)
ALP SERPL-CCNC: 190 U/L (ref 45–117)
ALT SERPL-CCNC: 75 U/L (ref 12–78)
ANION GAP SERPL CALC-SCNC: 5 MMOL/L (ref 2–12)
AST SERPL-CCNC: 32 U/L (ref 15–37)
BILIRUB SERPL-MCNC: 0.8 MG/DL (ref 0.2–1)
BUN SERPL-MCNC: 25 MG/DL (ref 6–20)
BUN/CREAT SERPL: 47 (ref 12–20)
CALCIUM SERPL-MCNC: 9.2 MG/DL (ref 8.5–10.1)
CHLORIDE SERPL-SCNC: 106 MMOL/L (ref 97–108)
CO2 SERPL-SCNC: 27 MMOL/L (ref 21–32)
CREAT SERPL-MCNC: 0.53 MG/DL (ref 0.55–1.02)
GLOBULIN SER CALC-MCNC: 3.3 G/DL (ref 2–4)
GLUCOSE BLD STRIP.AUTO-MCNC: 159 MG/DL (ref 65–117)
GLUCOSE BLD STRIP.AUTO-MCNC: 167 MG/DL (ref 65–117)
GLUCOSE BLD STRIP.AUTO-MCNC: 194 MG/DL (ref 65–117)
GLUCOSE BLD STRIP.AUTO-MCNC: 207 MG/DL (ref 65–117)
GLUCOSE BLD STRIP.AUTO-MCNC: 270 MG/DL (ref 65–117)
GLUCOSE SERPL-MCNC: 216 MG/DL (ref 65–100)
POTASSIUM SERPL-SCNC: 4.3 MMOL/L (ref 3.5–5.1)
PROT SERPL-MCNC: 6.2 G/DL (ref 6.4–8.2)
SERVICE CMNT-IMP: ABNORMAL
SODIUM SERPL-SCNC: 138 MMOL/L (ref 136–145)

## 2024-09-13 PROCEDURE — 6370000000 HC RX 637 (ALT 250 FOR IP): Performed by: FAMILY MEDICINE

## 2024-09-13 PROCEDURE — 97530 THERAPEUTIC ACTIVITIES: CPT

## 2024-09-13 PROCEDURE — 6370000000 HC RX 637 (ALT 250 FOR IP)

## 2024-09-13 PROCEDURE — 6370000000 HC RX 637 (ALT 250 FOR IP): Performed by: INTERNAL MEDICINE

## 2024-09-13 PROCEDURE — 6360000002 HC RX W HCPCS: Performed by: INTERNAL MEDICINE

## 2024-09-13 PROCEDURE — 6370000000 HC RX 637 (ALT 250 FOR IP): Performed by: STUDENT IN AN ORGANIZED HEALTH CARE EDUCATION/TRAINING PROGRAM

## 2024-09-13 PROCEDURE — 6360000002 HC RX W HCPCS: Performed by: STUDENT IN AN ORGANIZED HEALTH CARE EDUCATION/TRAINING PROGRAM

## 2024-09-13 PROCEDURE — 80053 COMPREHEN METABOLIC PANEL: CPT

## 2024-09-13 PROCEDURE — 36415 COLL VENOUS BLD VENIPUNCTURE: CPT

## 2024-09-13 PROCEDURE — 2580000003 HC RX 258: Performed by: STUDENT IN AN ORGANIZED HEALTH CARE EDUCATION/TRAINING PROGRAM

## 2024-09-13 PROCEDURE — 1100000000 HC RM PRIVATE

## 2024-09-13 PROCEDURE — 94761 N-INVAS EAR/PLS OXIMETRY MLT: CPT

## 2024-09-13 PROCEDURE — 82962 GLUCOSE BLOOD TEST: CPT

## 2024-09-13 PROCEDURE — 2580000003 HC RX 258: Performed by: INTERNAL MEDICINE

## 2024-09-13 RX ADMIN — GABAPENTIN 100 MG: 100 CAPSULE ORAL at 09:36

## 2024-09-13 RX ADMIN — INSULIN HUMAN 20 UNITS: 100 INJECTION, SUSPENSION SUBCUTANEOUS at 09:37

## 2024-09-13 RX ADMIN — LISINOPRIL 2.5 MG: 5 TABLET ORAL at 09:42

## 2024-09-13 RX ADMIN — INSULIN GLARGINE 18 UNITS: 100 INJECTION, SOLUTION SUBCUTANEOUS at 20:58

## 2024-09-13 RX ADMIN — MEROPENEM 1000 MG: 1 INJECTION INTRAVENOUS at 00:21

## 2024-09-13 RX ADMIN — INSULIN LISPRO 6 UNITS: 100 INJECTION, SOLUTION INTRAVENOUS; SUBCUTANEOUS at 12:45

## 2024-09-13 RX ADMIN — PANTOPRAZOLE SODIUM 40 MG: 40 TABLET, DELAYED RELEASE ORAL at 07:37

## 2024-09-13 RX ADMIN — INSULIN LISPRO 8 UNITS: 100 INJECTION, SOLUTION INTRAVENOUS; SUBCUTANEOUS at 12:45

## 2024-09-13 RX ADMIN — CARVEDILOL 12.5 MG: 12.5 TABLET, FILM COATED ORAL at 17:10

## 2024-09-13 RX ADMIN — MEROPENEM 1000 MG: 1 INJECTION INTRAVENOUS at 09:44

## 2024-09-13 RX ADMIN — DICLOFENAC SODIUM 1 G: 10 GEL TOPICAL at 09:38

## 2024-09-13 RX ADMIN — DICLOFENAC SODIUM 1 G: 10 GEL TOPICAL at 20:58

## 2024-09-13 RX ADMIN — SODIUM CHLORIDE, PRESERVATIVE FREE 10 ML: 5 INJECTION INTRAVENOUS at 09:36

## 2024-09-13 RX ADMIN — INSULIN LISPRO 6 UNITS: 100 INJECTION, SOLUTION INTRAVENOUS; SUBCUTANEOUS at 17:06

## 2024-09-13 RX ADMIN — GABAPENTIN 100 MG: 100 CAPSULE ORAL at 13:37

## 2024-09-13 RX ADMIN — GABAPENTIN 200 MG: 100 CAPSULE ORAL at 20:57

## 2024-09-13 RX ADMIN — CARVEDILOL 12.5 MG: 12.5 TABLET, FILM COATED ORAL at 09:42

## 2024-09-13 RX ADMIN — ESCITALOPRAM OXALATE 10 MG: 10 TABLET ORAL at 09:36

## 2024-09-13 RX ADMIN — MEROPENEM 1000 MG: 1 INJECTION INTRAVENOUS at 17:09

## 2024-09-13 RX ADMIN — PREDNISONE 30 MG: 20 TABLET ORAL at 09:35

## 2024-09-13 RX ADMIN — EZETIMIBE 10 MG: 10 TABLET ORAL at 20:57

## 2024-09-13 ASSESSMENT — PAIN SCALES - GENERAL
PAINLEVEL_OUTOF10: 0

## 2024-09-14 LAB
ALBUMIN SERPL-MCNC: 3 G/DL (ref 3.5–5)
ALBUMIN/GLOB SERPL: 1 (ref 1.1–2.2)
ALP SERPL-CCNC: 174 U/L (ref 45–117)
ALT SERPL-CCNC: 70 U/L (ref 12–78)
ANION GAP SERPL CALC-SCNC: 3 MMOL/L (ref 2–12)
AST SERPL-CCNC: 36 U/L (ref 15–37)
BILIRUB SERPL-MCNC: 0.8 MG/DL (ref 0.2–1)
BUN SERPL-MCNC: 23 MG/DL (ref 6–20)
BUN/CREAT SERPL: 43 (ref 12–20)
CALCIUM SERPL-MCNC: 9.2 MG/DL (ref 8.5–10.1)
CHLORIDE SERPL-SCNC: 109 MMOL/L (ref 97–108)
CO2 SERPL-SCNC: 28 MMOL/L (ref 21–32)
CREAT SERPL-MCNC: 0.54 MG/DL (ref 0.55–1.02)
GLOBULIN SER CALC-MCNC: 3 G/DL (ref 2–4)
GLUCOSE BLD STRIP.AUTO-MCNC: 128 MG/DL (ref 65–117)
GLUCOSE BLD STRIP.AUTO-MCNC: 139 MG/DL (ref 65–117)
GLUCOSE BLD STRIP.AUTO-MCNC: 169 MG/DL (ref 65–117)
GLUCOSE BLD STRIP.AUTO-MCNC: 187 MG/DL (ref 65–117)
GLUCOSE SERPL-MCNC: 149 MG/DL (ref 65–100)
POTASSIUM SERPL-SCNC: 3.7 MMOL/L (ref 3.5–5.1)
PROT SERPL-MCNC: 6 G/DL (ref 6.4–8.2)
SERVICE CMNT-IMP: ABNORMAL
SODIUM SERPL-SCNC: 140 MMOL/L (ref 136–145)

## 2024-09-14 PROCEDURE — 6360000002 HC RX W HCPCS: Performed by: INTERNAL MEDICINE

## 2024-09-14 PROCEDURE — 36415 COLL VENOUS BLD VENIPUNCTURE: CPT

## 2024-09-14 PROCEDURE — 6370000000 HC RX 637 (ALT 250 FOR IP)

## 2024-09-14 PROCEDURE — 80053 COMPREHEN METABOLIC PANEL: CPT

## 2024-09-14 PROCEDURE — 2580000003 HC RX 258: Performed by: INTERNAL MEDICINE

## 2024-09-14 PROCEDURE — 1100000000 HC RM PRIVATE

## 2024-09-14 PROCEDURE — 82962 GLUCOSE BLOOD TEST: CPT

## 2024-09-14 PROCEDURE — 94761 N-INVAS EAR/PLS OXIMETRY MLT: CPT

## 2024-09-14 PROCEDURE — 6370000000 HC RX 637 (ALT 250 FOR IP): Performed by: FAMILY MEDICINE

## 2024-09-14 PROCEDURE — 6370000000 HC RX 637 (ALT 250 FOR IP): Performed by: STUDENT IN AN ORGANIZED HEALTH CARE EDUCATION/TRAINING PROGRAM

## 2024-09-14 PROCEDURE — 6370000000 HC RX 637 (ALT 250 FOR IP): Performed by: INTERNAL MEDICINE

## 2024-09-14 PROCEDURE — 2580000003 HC RX 258: Performed by: STUDENT IN AN ORGANIZED HEALTH CARE EDUCATION/TRAINING PROGRAM

## 2024-09-14 RX ORDER — 0.9 % SODIUM CHLORIDE 0.9 %
500 INTRAVENOUS SOLUTION INTRAVENOUS ONCE
Status: COMPLETED | OUTPATIENT
Start: 2024-09-14 | End: 2024-09-14

## 2024-09-14 RX ORDER — 0.9 % SODIUM CHLORIDE 0.9 %
500 INTRAVENOUS SOLUTION INTRAVENOUS ONCE
Status: DISCONTINUED | OUTPATIENT
Start: 2024-09-14 | End: 2024-09-14

## 2024-09-14 RX ADMIN — SODIUM CHLORIDE: 9 INJECTION, SOLUTION INTRAVENOUS at 08:27

## 2024-09-14 RX ADMIN — SODIUM CHLORIDE, PRESERVATIVE FREE 10 ML: 5 INJECTION INTRAVENOUS at 20:10

## 2024-09-14 RX ADMIN — MEROPENEM 1000 MG: 1 INJECTION INTRAVENOUS at 16:55

## 2024-09-14 RX ADMIN — CARVEDILOL 12.5 MG: 12.5 TABLET, FILM COATED ORAL at 16:53

## 2024-09-14 RX ADMIN — SODIUM CHLORIDE 500 ML: 9 INJECTION, SOLUTION INTRAVENOUS at 13:11

## 2024-09-14 RX ADMIN — ESCITALOPRAM OXALATE 10 MG: 10 TABLET ORAL at 08:22

## 2024-09-14 RX ADMIN — CARVEDILOL 12.5 MG: 12.5 TABLET, FILM COATED ORAL at 08:22

## 2024-09-14 RX ADMIN — GABAPENTIN 100 MG: 100 CAPSULE ORAL at 08:22

## 2024-09-14 RX ADMIN — MEROPENEM 1000 MG: 1 INJECTION INTRAVENOUS at 00:14

## 2024-09-14 RX ADMIN — INSULIN HUMAN 25 UNITS: 100 INJECTION, SUSPENSION SUBCUTANEOUS at 08:24

## 2024-09-14 RX ADMIN — GABAPENTIN 100 MG: 100 CAPSULE ORAL at 13:54

## 2024-09-14 RX ADMIN — INSULIN LISPRO 6 UNITS: 100 INJECTION, SOLUTION INTRAVENOUS; SUBCUTANEOUS at 16:55

## 2024-09-14 RX ADMIN — PANTOPRAZOLE SODIUM 40 MG: 40 TABLET, DELAYED RELEASE ORAL at 06:22

## 2024-09-14 RX ADMIN — PREDNISONE 30 MG: 20 TABLET ORAL at 08:22

## 2024-09-14 RX ADMIN — MEROPENEM 1000 MG: 1 INJECTION INTRAVENOUS at 08:27

## 2024-09-14 RX ADMIN — LISINOPRIL 2.5 MG: 5 TABLET ORAL at 08:22

## 2024-09-14 RX ADMIN — DICLOFENAC SODIUM 1 G: 10 GEL TOPICAL at 08:25

## 2024-09-14 RX ADMIN — INSULIN LISPRO 6 UNITS: 100 INJECTION, SOLUTION INTRAVENOUS; SUBCUTANEOUS at 12:35

## 2024-09-14 RX ADMIN — INSULIN LISPRO 6 UNITS: 100 INJECTION, SOLUTION INTRAVENOUS; SUBCUTANEOUS at 08:24

## 2024-09-14 RX ADMIN — DICLOFENAC SODIUM 1 G: 10 GEL TOPICAL at 22:05

## 2024-09-14 RX ADMIN — EZETIMIBE 10 MG: 10 TABLET ORAL at 20:09

## 2024-09-14 RX ADMIN — INSULIN GLARGINE 18 UNITS: 100 INJECTION, SOLUTION SUBCUTANEOUS at 22:03

## 2024-09-14 RX ADMIN — GABAPENTIN 200 MG: 100 CAPSULE ORAL at 20:09

## 2024-09-14 RX ADMIN — SODIUM CHLORIDE: 9 INJECTION, SOLUTION INTRAVENOUS at 12:45

## 2024-09-14 ASSESSMENT — PAIN DESCRIPTION - PAIN TYPE
TYPE: NEUROPATHIC PAIN

## 2024-09-14 ASSESSMENT — PAIN SCALES - GENERAL
PAINLEVEL_OUTOF10: 0
PAINLEVEL_OUTOF10: 6
PAINLEVEL_OUTOF10: 5
PAINLEVEL_OUTOF10: 5
PAINLEVEL_OUTOF10: 4
PAINLEVEL_OUTOF10: 0

## 2024-09-14 ASSESSMENT — PAIN DESCRIPTION - ONSET
ONSET: ON-GOING

## 2024-09-14 ASSESSMENT — PAIN - FUNCTIONAL ASSESSMENT
PAIN_FUNCTIONAL_ASSESSMENT: PREVENTS OR INTERFERES SOME ACTIVE ACTIVITIES AND ADLS

## 2024-09-14 ASSESSMENT — PAIN DESCRIPTION - ORIENTATION
ORIENTATION: RIGHT;LEFT
ORIENTATION: RIGHT;LEFT
ORIENTATION: LEFT;RIGHT

## 2024-09-14 ASSESSMENT — PAIN DESCRIPTION - FREQUENCY
FREQUENCY: CONTINUOUS

## 2024-09-14 ASSESSMENT — PAIN DESCRIPTION - LOCATION
LOCATION: LEG

## 2024-09-14 ASSESSMENT — PAIN DESCRIPTION - DESCRIPTORS
DESCRIPTORS: OTHER (COMMENT);PINS AND NEEDLES
DESCRIPTORS: PINS AND NEEDLES
DESCRIPTORS: PINS AND NEEDLES

## 2024-09-15 VITALS
WEIGHT: 201 LBS | RESPIRATION RATE: 16 BRPM | DIASTOLIC BLOOD PRESSURE: 60 MMHG | HEART RATE: 64 BPM | SYSTOLIC BLOOD PRESSURE: 110 MMHG | OXYGEN SATURATION: 94 % | TEMPERATURE: 98.2 F | HEIGHT: 62 IN | BODY MASS INDEX: 36.99 KG/M2

## 2024-09-15 LAB
GLUCOSE BLD STRIP.AUTO-MCNC: 151 MG/DL (ref 65–117)
SERVICE CMNT-IMP: ABNORMAL

## 2024-09-15 PROCEDURE — 6370000000 HC RX 637 (ALT 250 FOR IP)

## 2024-09-15 PROCEDURE — 6370000000 HC RX 637 (ALT 250 FOR IP): Performed by: INTERNAL MEDICINE

## 2024-09-15 PROCEDURE — 82962 GLUCOSE BLOOD TEST: CPT

## 2024-09-15 PROCEDURE — 6370000000 HC RX 637 (ALT 250 FOR IP): Performed by: STUDENT IN AN ORGANIZED HEALTH CARE EDUCATION/TRAINING PROGRAM

## 2024-09-15 PROCEDURE — 2580000003 HC RX 258: Performed by: STUDENT IN AN ORGANIZED HEALTH CARE EDUCATION/TRAINING PROGRAM

## 2024-09-15 PROCEDURE — 6370000000 HC RX 637 (ALT 250 FOR IP): Performed by: FAMILY MEDICINE

## 2024-09-15 RX ORDER — GABAPENTIN 100 MG/1
100 CAPSULE ORAL 2 TIMES DAILY
Qty: 60 CAPSULE | Refills: 0 | Status: SHIPPED | OUTPATIENT
Start: 2024-09-15 | End: 2024-10-15

## 2024-09-15 RX ORDER — INSULIN GLARGINE 100 [IU]/ML
20 INJECTION, SOLUTION SUBCUTANEOUS NIGHTLY
Qty: 15 ML | Refills: 11 | Status: SHIPPED | OUTPATIENT
Start: 2024-09-15 | End: 2024-09-15 | Stop reason: HOSPADM

## 2024-09-15 RX ORDER — PREDNISONE 10 MG/1
30 TABLET ORAL DAILY
Qty: 30 TABLET | Refills: 0 | Status: SHIPPED | OUTPATIENT
Start: 2024-09-16 | End: 2024-09-26

## 2024-09-15 RX ORDER — INSULIN LISPRO 100 [IU]/ML
6 INJECTION, SOLUTION INTRAVENOUS; SUBCUTANEOUS
Qty: 1 EACH | Refills: 0 | Status: SHIPPED
Start: 2024-09-15

## 2024-09-15 RX ORDER — EZETIMIBE 10 MG/1
10 TABLET ORAL NIGHTLY
Qty: 30 TABLET | Refills: 3 | Status: SHIPPED | OUTPATIENT
Start: 2024-09-15

## 2024-09-15 RX ORDER — ESCITALOPRAM OXALATE 10 MG/1
10 TABLET ORAL DAILY
Qty: 30 TABLET | Refills: 3 | Status: SHIPPED | OUTPATIENT
Start: 2024-09-16

## 2024-09-15 RX ORDER — INSULIN GLARGINE 100 [IU]/ML
20 INJECTION, SOLUTION SUBCUTANEOUS NIGHTLY
Qty: 10 ML | Refills: 3 | Status: SHIPPED
Start: 2024-09-15

## 2024-09-15 RX ADMIN — GABAPENTIN 100 MG: 100 CAPSULE ORAL at 08:17

## 2024-09-15 RX ADMIN — INSULIN HUMAN 25 UNITS: 100 INJECTION, SUSPENSION SUBCUTANEOUS at 08:18

## 2024-09-15 RX ADMIN — ESCITALOPRAM OXALATE 10 MG: 10 TABLET ORAL at 08:18

## 2024-09-15 RX ADMIN — DICLOFENAC SODIUM 1 G: 10 GEL TOPICAL at 08:21

## 2024-09-15 RX ADMIN — CARVEDILOL 12.5 MG: 12.5 TABLET, FILM COATED ORAL at 08:17

## 2024-09-15 RX ADMIN — SODIUM CHLORIDE, PRESERVATIVE FREE 10 ML: 5 INJECTION INTRAVENOUS at 08:19

## 2024-09-15 RX ADMIN — INSULIN LISPRO 6 UNITS: 100 INJECTION, SOLUTION INTRAVENOUS; SUBCUTANEOUS at 08:19

## 2024-09-15 RX ADMIN — LISINOPRIL 2.5 MG: 5 TABLET ORAL at 08:20

## 2024-09-15 RX ADMIN — PREDNISONE 30 MG: 20 TABLET ORAL at 08:17

## 2024-09-15 RX ADMIN — PANTOPRAZOLE SODIUM 40 MG: 40 TABLET, DELAYED RELEASE ORAL at 06:21

## 2024-09-15 ASSESSMENT — PAIN DESCRIPTION - FREQUENCY: FREQUENCY: CONTINUOUS

## 2024-09-15 ASSESSMENT — PAIN DESCRIPTION - DESCRIPTORS: DESCRIPTORS: PINS AND NEEDLES

## 2024-09-15 ASSESSMENT — PAIN DESCRIPTION - PAIN TYPE: TYPE: NEUROPATHIC PAIN

## 2024-09-15 ASSESSMENT — PAIN DESCRIPTION - ORIENTATION: ORIENTATION: RIGHT;LEFT

## 2024-09-15 ASSESSMENT — PAIN SCALES - GENERAL: PAINLEVEL_OUTOF10: 5

## 2024-09-15 ASSESSMENT — PAIN - FUNCTIONAL ASSESSMENT: PAIN_FUNCTIONAL_ASSESSMENT: PREVENTS OR INTERFERES SOME ACTIVE ACTIVITIES AND ADLS

## 2024-09-15 ASSESSMENT — PAIN DESCRIPTION - ONSET: ONSET: ON-GOING

## 2024-09-15 ASSESSMENT — PAIN DESCRIPTION - LOCATION: LOCATION: LEG

## 2024-09-16 ENCOUNTER — CARE COORDINATION (OUTPATIENT)
Dept: CARE COORDINATION | Age: 79
End: 2024-09-16

## 2024-10-02 ENCOUNTER — CARE COORDINATION (OUTPATIENT)
Facility: CLINIC | Age: 79
End: 2024-10-02

## 2024-10-02 DIAGNOSIS — I12.9 HYPERTENSION WITH RENAL DISEASE: Primary | ICD-10-CM

## 2024-10-02 DIAGNOSIS — E11.22 CONTROLLED TYPE 2 DIABETES MELLITUS WITH STAGE 2 CHRONIC KIDNEY DISEASE, WITH LONG-TERM CURRENT USE OF INSULIN (HCC): ICD-10-CM

## 2024-10-02 DIAGNOSIS — N18.2 CONTROLLED TYPE 2 DIABETES MELLITUS WITH STAGE 2 CHRONIC KIDNEY DISEASE, WITH LONG-TERM CURRENT USE OF INSULIN (HCC): ICD-10-CM

## 2024-10-02 DIAGNOSIS — Z79.4 CONTROLLED TYPE 2 DIABETES MELLITUS WITH STAGE 2 CHRONIC KIDNEY DISEASE, WITH LONG-TERM CURRENT USE OF INSULIN (HCC): ICD-10-CM

## 2024-10-02 NOTE — PROGRESS NOTES
Remote Patient Order Discontinued    Received request from Heena Rangel RN to discontinue order for remote patient monitoring of Diabetes and HTN and order completed.  Patient is in a SNF.

## 2024-10-02 NOTE — CARE COORDINATION
Patient Siomara Collins  10/02/24     Care Coordination  placed call to patient to arrange RPM kit  through [employer]. Left HIPAA Compliant Message     provided return and how to pack equipment in original packing via the patients voicemail if available and provided call back number should patient have questions.    Patient made aware UPS will  equipment in 2-4 days.

## 2024-10-03 ENCOUNTER — TELEPHONE (OUTPATIENT)
Age: 79
End: 2024-10-03

## 2024-10-04 NOTE — TELEPHONE ENCOUNTER
Patient was supposed to be seen at 10:30am today but per her son, Jimi ([phone]), she is still in rehab.  Have him call us when she has been discharged from rehab and I will work her in sometime in the 1-2 weeks after she gets home when I have a cancellation.  Thanks!

## 2024-10-04 NOTE — TELEPHONE ENCOUNTER
Patient's son notified of message per Dr. Colon and voiced understanding of what was read to them.

## 2024-10-15 ENCOUNTER — TELEPHONE (OUTPATIENT)
Age: 79
End: 2024-10-15

## 2024-10-15 NOTE — TELEPHONE ENCOUNTER
Please let her know that I do not sign orders for PT/OT and these will have to come from her PCP.  I do have an opening on Fri 10/18 at 9:50am or 12:10pm if she would like to be seen for a follow up visit.

## 2024-10-15 NOTE — TELEPHONE ENCOUNTER
We had the following [employer] exchange:    Super.  I put you down and will see you then.    Our office is located at:  [facility], Medical Office Building II (TWO), 3rd floor, [address]  [address] [address] [phone] (phone)      ===View-only below this line===      ----- Message -----       From:Siomara Collins       Sent:10/15/2024  6:53 PM EDT         To:User Message Message List    Subject:follow up appointment    Oct. 18@ 12:10 thank you, this date and time works for me.

## 2024-10-15 NOTE — TELEPHONE ENCOUNTER
LVM andrzej Gonsalez from [facility] informing her of message per Dr Colon.   LVM for pt regarding follow up appt date and time.

## 2024-10-15 NOTE — TELEPHONE ENCOUNTER
Pt LVM stating pt has been discharged from rehab and will need PT/OT. She stated pt's PCP will not sign until she is seen first. Pt has an appt scheduled, [place] wanted to know if Dr Colon would sign until she is seen.

## 2024-10-15 NOTE — TELEPHONE ENCOUNTER
Sent her the following message through [employer]:    I received a message you have come home from rehab.  I have an opening on Friday 10/18/25 at 9:50am or 12:10pm in our [place] office.  Let me know if you can take one of these.

## 2024-10-18 ENCOUNTER — APPOINTMENT (OUTPATIENT)
Facility: HOSPITAL | Age: 79
DRG: 871 | End: 2024-10-18
Payer: MEDICARE

## 2024-10-18 ENCOUNTER — HOSPITAL ENCOUNTER (INPATIENT)
Facility: HOSPITAL | Age: 79
LOS: 5 days | Discharge: NURSING, REHAB OR LONG-TERM CARE | DRG: 871 | End: 2024-10-24
Attending: EMERGENCY MEDICINE | Admitting: HOSPITALIST
Payer: MEDICARE

## 2024-10-18 ENCOUNTER — OFFICE VISIT (OUTPATIENT)
Age: 79
End: 2024-10-18
Payer: MEDICARE

## 2024-10-18 VITALS
HEIGHT: 62 IN | BODY MASS INDEX: 36.75 KG/M2 | DIASTOLIC BLOOD PRESSURE: 69 MMHG | HEART RATE: 85 BPM | SYSTOLIC BLOOD PRESSURE: 121 MMHG

## 2024-10-18 DIAGNOSIS — Z79.4 LONG TERM (CURRENT) USE OF INSULIN (HCC): ICD-10-CM

## 2024-10-18 DIAGNOSIS — E11.29 TYPE 2 DIABETES MELLITUS WITH OTHER DIABETIC KIDNEY COMPLICATION (HCC): Primary | ICD-10-CM

## 2024-10-18 DIAGNOSIS — D72.829 LEUKOCYTOSIS, UNSPECIFIED TYPE: ICD-10-CM

## 2024-10-18 DIAGNOSIS — A49.9 UTI (URINARY TRACT INFECTION), BACTERIAL: ICD-10-CM

## 2024-10-18 DIAGNOSIS — N39.0 UTI (URINARY TRACT INFECTION), BACTERIAL: ICD-10-CM

## 2024-10-18 DIAGNOSIS — I10 ESSENTIAL (PRIMARY) HYPERTENSION: ICD-10-CM

## 2024-10-18 DIAGNOSIS — R60.9 EDEMA, UNSPECIFIED TYPE: ICD-10-CM

## 2024-10-18 DIAGNOSIS — E78.2 MIXED HYPERLIPIDEMIA: ICD-10-CM

## 2024-10-18 DIAGNOSIS — R07.9 CHEST PAIN, UNSPECIFIED TYPE: Primary | ICD-10-CM

## 2024-10-18 DIAGNOSIS — E11.65 UNCONTROLLED TYPE 2 DIABETES MELLITUS WITH HYPERGLYCEMIA (HCC): ICD-10-CM

## 2024-10-18 LAB
ALBUMIN SERPL-MCNC: 2.8 G/DL (ref 3.5–5)
ALBUMIN/GLOB SERPL: 0.8 (ref 1.1–2.2)
ALP SERPL-CCNC: 206 U/L (ref 45–117)
ALT SERPL-CCNC: 116 U/L (ref 12–78)
ANION GAP SERPL CALC-SCNC: 7 MMOL/L (ref 2–12)
ANION GAP SERPL CALC-SCNC: 8 MMOL/L (ref 2–12)
APPEARANCE UR: ABNORMAL
AST SERPL-CCNC: 259 U/L (ref 15–37)
BACTERIA URNS QL MICRO: ABNORMAL /HPF
BASOPHILS # BLD: 0 K/UL (ref 0–0.1)
BASOPHILS NFR BLD: 0 % (ref 0–1)
BILIRUB SERPL-MCNC: 2.6 MG/DL (ref 0.2–1)
BILIRUB UR QL: NEGATIVE
BUN SERPL-MCNC: 16 MG/DL (ref 6–20)
BUN SERPL-MCNC: 17 MG/DL (ref 6–20)
BUN/CREAT SERPL: 17 (ref 12–20)
BUN/CREAT SERPL: 17 (ref 12–20)
CALCIUM SERPL-MCNC: 8.1 MG/DL (ref 8.5–10.1)
CALCIUM SERPL-MCNC: 9.2 MG/DL (ref 8.5–10.1)
CHLORIDE SERPL-SCNC: 104 MMOL/L (ref 97–108)
CHLORIDE SERPL-SCNC: 109 MMOL/L (ref 97–108)
CO2 SERPL-SCNC: 22 MMOL/L (ref 21–32)
CO2 SERPL-SCNC: 25 MMOL/L (ref 21–32)
COLOR UR: ABNORMAL
CREAT SERPL-MCNC: 0.95 MG/DL (ref 0.55–1.02)
CREAT SERPL-MCNC: 1 MG/DL (ref 0.55–1.02)
DIFFERENTIAL METHOD BLD: ABNORMAL
EOSINOPHIL # BLD: 0 K/UL (ref 0–0.4)
EOSINOPHIL NFR BLD: 0 % (ref 0–7)
EPITH CASTS URNS QL MICRO: ABNORMAL /LPF
ERYTHROCYTE [DISTWIDTH] IN BLOOD BY AUTOMATED COUNT: 13.7 % (ref 11.5–14.5)
GLOBULIN SER CALC-MCNC: 3.5 G/DL (ref 2–4)
GLUCOSE SERPL-MCNC: 243 MG/DL (ref 65–100)
GLUCOSE SERPL-MCNC: 295 MG/DL (ref 65–100)
GLUCOSE UR STRIP.AUTO-MCNC: >1000 MG/DL
HCT VFR BLD AUTO: 45.4 % (ref 35–47)
HGB BLD-MCNC: 15.4 G/DL (ref 11.5–16)
HGB UR QL STRIP: ABNORMAL
HYALINE CASTS URNS QL MICRO: ABNORMAL /LPF (ref 0–5)
IMM GRANULOCYTES # BLD AUTO: 0.1 K/UL (ref 0–0.04)
IMM GRANULOCYTES NFR BLD AUTO: 1 % (ref 0–0.5)
KETONES UR QL STRIP.AUTO: ABNORMAL MG/DL
LEUKOCYTE ESTERASE UR QL STRIP.AUTO: ABNORMAL
LIPASE SERPL-CCNC: 65 U/L (ref 13–75)
LYMPHOCYTES # BLD: 0.6 K/UL (ref 0.8–3.5)
LYMPHOCYTES NFR BLD: 4 % (ref 12–49)
MCH RBC QN AUTO: 33.6 PG (ref 26–34)
MCHC RBC AUTO-ENTMCNC: 33.9 G/DL (ref 30–36.5)
MCV RBC AUTO: 98.9 FL (ref 80–99)
MONOCYTES # BLD: 0.4 K/UL (ref 0–1)
MONOCYTES NFR BLD: 3 % (ref 5–13)
NEUTS SEG # BLD: 13.8 K/UL (ref 1.8–8)
NEUTS SEG NFR BLD: 92 % (ref 32–75)
NITRITE UR QL STRIP.AUTO: POSITIVE
NRBC # BLD: 0 K/UL (ref 0–0.01)
NRBC BLD-RTO: 0 PER 100 WBC
PH UR STRIP: 5.5 (ref 5–8)
PLATELET # BLD AUTO: 278 K/UL (ref 150–400)
PMV BLD AUTO: 10.1 FL (ref 8.9–12.9)
POTASSIUM SERPL-SCNC: 4.2 MMOL/L (ref 3.5–5.1)
POTASSIUM SERPL-SCNC: 4.2 MMOL/L (ref 3.5–5.1)
PROT SERPL-MCNC: 6.3 G/DL (ref 6.4–8.2)
PROT UR STRIP-MCNC: ABNORMAL MG/DL
RBC # BLD AUTO: 4.59 M/UL (ref 3.8–5.2)
RBC #/AREA URNS HPF: ABNORMAL /HPF (ref 0–5)
RBC MORPH BLD: ABNORMAL
SODIUM SERPL-SCNC: 137 MMOL/L (ref 136–145)
SODIUM SERPL-SCNC: 138 MMOL/L (ref 136–145)
SP GR UR REFRACTOMETRY: 1.03 (ref 1–1.03)
TROPONIN I SERPL HS-MCNC: 11 NG/L (ref 0–51)
TROPONIN I SERPL HS-MCNC: 13 NG/L (ref 0–51)
TROPONIN I SERPL HS-MCNC: 14 NG/L (ref 0–51)
UROBILINOGEN UR QL STRIP.AUTO: 1 EU/DL (ref 0.2–1)
WBC # BLD AUTO: 14.9 K/UL (ref 3.6–11)
WBC URNS QL MICRO: >100 /HPF (ref 0–4)
YEAST URNS QL MICRO: PRESENT

## 2024-10-18 PROCEDURE — 87086 URINE CULTURE/COLONY COUNT: CPT

## 2024-10-18 PROCEDURE — 84484 ASSAY OF TROPONIN QUANT: CPT

## 2024-10-18 PROCEDURE — G8427 DOCREV CUR MEDS BY ELIG CLIN: HCPCS | Performed by: INTERNAL MEDICINE

## 2024-10-18 PROCEDURE — 94762 N-INVAS EAR/PLS OXIMTRY CONT: CPT

## 2024-10-18 PROCEDURE — 87088 URINE BACTERIA CULTURE: CPT

## 2024-10-18 PROCEDURE — 80053 COMPREHEN METABOLIC PANEL: CPT

## 2024-10-18 PROCEDURE — G8400 PT W/DXA NO RESULTS DOC: HCPCS | Performed by: INTERNAL MEDICINE

## 2024-10-18 PROCEDURE — G8484 FLU IMMUNIZE NO ADMIN: HCPCS | Performed by: INTERNAL MEDICINE

## 2024-10-18 PROCEDURE — 1090F PRES/ABSN URINE INCON ASSESS: CPT | Performed by: INTERNAL MEDICINE

## 2024-10-18 PROCEDURE — 1123F ACP DISCUSS/DSCN MKR DOCD: CPT | Performed by: INTERNAL MEDICINE

## 2024-10-18 PROCEDURE — 99285 EMERGENCY DEPT VISIT HI MDM: CPT

## 2024-10-18 PROCEDURE — 81001 URINALYSIS AUTO W/SCOPE: CPT

## 2024-10-18 PROCEDURE — 87186 SC STD MICRODIL/AGAR DIL: CPT

## 2024-10-18 PROCEDURE — 71046 X-RAY EXAM CHEST 2 VIEWS: CPT

## 2024-10-18 PROCEDURE — 94761 N-INVAS EAR/PLS OXIMETRY MLT: CPT

## 2024-10-18 PROCEDURE — 3078F DIAST BP <80 MM HG: CPT | Performed by: INTERNAL MEDICINE

## 2024-10-18 PROCEDURE — 3074F SYST BP LT 130 MM HG: CPT | Performed by: INTERNAL MEDICINE

## 2024-10-18 PROCEDURE — 36415 COLL VENOUS BLD VENIPUNCTURE: CPT

## 2024-10-18 PROCEDURE — 83690 ASSAY OF LIPASE: CPT

## 2024-10-18 PROCEDURE — G8417 CALC BMI ABV UP PARAM F/U: HCPCS | Performed by: INTERNAL MEDICINE

## 2024-10-18 PROCEDURE — 3046F HEMOGLOBIN A1C LEVEL >9.0%: CPT | Performed by: INTERNAL MEDICINE

## 2024-10-18 PROCEDURE — 99215 OFFICE O/P EST HI 40 MIN: CPT | Performed by: INTERNAL MEDICINE

## 2024-10-18 PROCEDURE — 85025 COMPLETE CBC W/AUTO DIFF WBC: CPT

## 2024-10-18 PROCEDURE — 1036F TOBACCO NON-USER: CPT | Performed by: INTERNAL MEDICINE

## 2024-10-18 PROCEDURE — 93005 ELECTROCARDIOGRAM TRACING: CPT | Performed by: HOSPITALIST

## 2024-10-18 RX ORDER — INSULIN GLARGINE 100 [IU]/ML
50 INJECTION, SOLUTION SUBCUTANEOUS DAILY
COMMUNITY
End: 2024-10-18 | Stop reason: SDUPTHER

## 2024-10-18 RX ORDER — INSULIN GLARGINE 100 [IU]/ML
30 INJECTION, SOLUTION SUBCUTANEOUS DAILY
Qty: 15 ML | Refills: 11 | Status: SHIPPED | OUTPATIENT
Start: 2024-10-18 | End: 2024-10-18 | Stop reason: SDUPTHER

## 2024-10-18 RX ORDER — INSULIN GLARGINE 100 [IU]/ML
30 INJECTION, SOLUTION SUBCUTANEOUS DAILY
Qty: 15 ML | Refills: 11 | Status: ON HOLD | OUTPATIENT
Start: 2024-10-18

## 2024-10-18 RX ORDER — INSULIN ASPART 100 [IU]/ML
15 INJECTION, SOLUTION INTRAVENOUS; SUBCUTANEOUS
Status: ON HOLD | COMMUNITY

## 2024-10-18 RX ORDER — CARVEDILOL 6.25 MG/1
6.25 TABLET ORAL 2 TIMES DAILY WITH MEALS
Status: ON HOLD | COMMUNITY

## 2024-10-18 RX ORDER — CALCIUM CARBONATE 600 MG
TABLET ORAL 2 TIMES DAILY
Status: ON HOLD | COMMUNITY
Start: 2024-10-16

## 2024-10-18 RX ORDER — PREDNISONE 10 MG/1
30 TABLET ORAL DAILY
Status: ON HOLD | COMMUNITY

## 2024-10-18 ASSESSMENT — PAIN SCALES - GENERAL: PAINLEVEL_OUTOF10: 8

## 2024-10-18 ASSESSMENT — PAIN - FUNCTIONAL ASSESSMENT: PAIN_FUNCTIONAL_ASSESSMENT: 0-10

## 2024-10-18 ASSESSMENT — PAIN DESCRIPTION - LOCATION: LOCATION: CHEST;ABDOMEN

## 2024-10-18 ASSESSMENT — PAIN DESCRIPTION - DESCRIPTORS: DESCRIPTORS: SHARP

## 2024-10-18 NOTE — PROGRESS NOTES
glargine (LANTUS SOLOSTAR) 100 UNIT/ML injection pen Inject 30 Units into the skin daily New lower dose replaces prior script on file for 50 units    gabapentin (NEURONTIN) 100 MG capsule Take 1 capsule by mouth 2 times daily for 30 days. Max Daily Amount: 200 mg    escitalopram (LEXAPRO) 10 MG tablet Take 1 tablet by mouth daily    ezetimibe (ZETIA) 10 MG tablet Take 1 tablet by mouth nightly    loperamide (IMODIUM) 2 MG capsule Take by mouth as needed    QUEtiapine (SEROQUEL) 25 MG tablet Take 1 tablet by mouth every 8 (eight) hours    bumetanide (BUMEX) 1 MG tablet Take 1 tablet by mouth daily    blood glucose monitor strips Test 4 times a day & as needed for symptoms of irregular blood glucose. Dispense sufficient amount for indicated testing frequency plus additional to accommodate PRN testing needs.    Insulin Pen Needle 32G X 4 MM MISC 1 each by Does not apply route daily    Lancets 30G MISC 1 each by Does not apply route in the morning, at noon, in the evening, and at bedtime Test four times a day & as needed for symptoms of irregular blood glucose. Dispense sufficient amount for indicated testing frequency plus additional to accommodate PRN testing needs.    acetaminophen (TYLENOL) 325 MG tablet Take 2 tablets by mouth every 6 hours as needed for Pain    montelukast (SINGULAIR) 10 MG tablet Take 1 tablet by mouth nightly    primidone (MYSOLINE) 50 MG tablet Take 1 tablet by mouth in the morning and at bedtime    rosuvastatin (CRESTOR) 20 MG tablet Take 1 tablet by mouth nightly    diclofenac sodium (VOLTAREN) 1 % GEL Apply 2 g topically in the morning, at noon, in the evening, and at bedtime    lanolin-petrolatum (A+D) ointment Apply topically 2 times daily as needed for Dry Skin    lidocaine (HM LIDOCAINE PATCH) 4 % external patch Place 1 patch onto the skin daily    Multiple Vitamins-Minerals (EQ MULTIVITAMINS ADULT GUMMY) CHEW Take 1 tablet by mouth daily Naturemade gummy multivitamin    omeprazole

## 2024-10-18 NOTE — PATIENT INSTRUCTIONS
1) I will begin NPH 55 units in the morning to be take at the same time as 30 mg of prednisone to prevent your sugars from going high in the afternoon from the steroid.  I printed a prescription to take to [facility] and sent a prescription directly to [facility].    2) I will decrease your lantus to 30 units in the morning to give you 24 hours of long acting insulin when not eating. I printed a prescription to take to [facility] and sent a prescription directly to [facility].    3) I will continue you on novolog 15 units before meals plus the scale in place 2 units for 200-249 + 2 units for every 50 mg/dl over 250.    4) Please have the facility contact me with any questions regarding your sugars at [phone].  Fax is [phone].

## 2024-10-19 ENCOUNTER — APPOINTMENT (OUTPATIENT)
Facility: HOSPITAL | Age: 79
DRG: 871 | End: 2024-10-19
Payer: MEDICARE

## 2024-10-19 LAB
-: NORMAL
ALBUMIN SERPL-MCNC: 2.7 G/DL (ref 3.5–5)
ALBUMIN/GLOB SERPL: 0.9 (ref 1.1–2.2)
ALP SERPL-CCNC: 216 U/L (ref 45–117)
ALT SERPL-CCNC: 163 U/L (ref 12–78)
ANION GAP SERPL CALC-SCNC: 7 MMOL/L (ref 2–12)
AST SERPL-CCNC: 292 U/L (ref 15–37)
BASOPHILS # BLD: 0 K/UL (ref 0–0.1)
BASOPHILS NFR BLD: 0 % (ref 0–1)
BILIRUB SERPL-MCNC: 3.5 MG/DL (ref 0.2–1)
BUN SERPL-MCNC: 17 MG/DL (ref 6–20)
BUN/CREAT SERPL: 18 (ref 12–20)
CALCIUM SERPL-MCNC: 8.5 MG/DL (ref 8.5–10.1)
CHLORIDE SERPL-SCNC: 106 MMOL/L (ref 97–108)
CLUE CELLS VAG QL WET PREP: ABNORMAL
CO2 SERPL-SCNC: 25 MMOL/L (ref 21–32)
COMMENT:: NORMAL
CREAT SERPL-MCNC: 0.92 MG/DL (ref 0.55–1.02)
DIFFERENTIAL METHOD BLD: ABNORMAL
EKG ATRIAL RATE: 90 BPM
EKG DIAGNOSIS: NORMAL
EKG P-R INTERVAL: 208 MS
EKG Q-T INTERVAL: 468 MS
EKG QRS DURATION: 138 MS
EKG QTC CALCULATION (BAZETT): 572 MS
EKG R AXIS: 88 DEGREES
EKG T AXIS: 14 DEGREES
EKG VENTRICULAR RATE: 90 BPM
EOSINOPHIL # BLD: 0 K/UL (ref 0–0.4)
EOSINOPHIL NFR BLD: 0 % (ref 0–7)
ERYTHROCYTE [DISTWIDTH] IN BLOOD BY AUTOMATED COUNT: 13.8 % (ref 11.5–14.5)
GLOBULIN SER CALC-MCNC: 3 G/DL (ref 2–4)
GLUCOSE BLD STRIP.AUTO-MCNC: 146 MG/DL (ref 65–117)
GLUCOSE BLD STRIP.AUTO-MCNC: 161 MG/DL (ref 65–117)
GLUCOSE BLD STRIP.AUTO-MCNC: 222 MG/DL (ref 65–117)
GLUCOSE BLD STRIP.AUTO-MCNC: 260 MG/DL (ref 65–117)
GLUCOSE SERPL-MCNC: 246 MG/DL (ref 65–100)
HAV IGM SER QL: NONREACTIVE
HBV CORE IGM SER QL: NONREACTIVE
HBV SURFACE AG SER QL: 0.12 INDEX
HBV SURFACE AG SER QL: NEGATIVE
HCT VFR BLD AUTO: 38.1 % (ref 35–47)
HCV AB SER IA-ACNC: <0.02 INDEX
HCV AB SERPL QL IA: NONREACTIVE
HGB BLD-MCNC: 12.8 G/DL (ref 11.5–16)
IMM GRANULOCYTES # BLD AUTO: 0.1 K/UL (ref 0–0.04)
IMM GRANULOCYTES NFR BLD AUTO: 1 % (ref 0–0.5)
LACTATE SERPL-SCNC: 2.3 MMOL/L (ref 0.4–2)
LACTATE SERPL-SCNC: 3.8 MMOL/L (ref 0.4–2)
LYMPHOCYTES # BLD: 0.7 K/UL (ref 0.8–3.5)
LYMPHOCYTES NFR BLD: 5 % (ref 12–49)
MCH RBC QN AUTO: 34 PG (ref 26–34)
MCHC RBC AUTO-ENTMCNC: 33.6 G/DL (ref 30–36.5)
MCV RBC AUTO: 101.3 FL (ref 80–99)
MONOCYTES # BLD: 0.8 K/UL (ref 0–1)
MONOCYTES NFR BLD: 6 % (ref 5–13)
NEUTS SEG # BLD: 12.4 K/UL (ref 1.8–8)
NEUTS SEG NFR BLD: 88 % (ref 32–75)
NRBC # BLD: 0 K/UL (ref 0–0.01)
NRBC BLD-RTO: 0 PER 100 WBC
PLATELET # BLD AUTO: 206 K/UL (ref 150–400)
PMV BLD AUTO: 10.2 FL (ref 8.9–12.9)
POTASSIUM SERPL-SCNC: 3.2 MMOL/L (ref 3.5–5.1)
PROT SERPL-MCNC: 5.7 G/DL (ref 6.4–8.2)
RBC # BLD AUTO: 3.76 M/UL (ref 3.8–5.2)
RBC MORPH BLD: ABNORMAL
SERVICE CMNT-IMP: ABNORMAL
SODIUM SERPL-SCNC: 138 MMOL/L (ref 136–145)
SPECIMEN HOLD: NORMAL
T VAGINALIS VAG QL WET PREP: ABNORMAL
WBC # BLD AUTO: 14 K/UL (ref 3.6–11)
YEAST: ABNORMAL

## 2024-10-19 PROCEDURE — 6370000000 HC RX 637 (ALT 250 FOR IP): Performed by: HOSPITALIST

## 2024-10-19 PROCEDURE — 94761 N-INVAS EAR/PLS OXIMETRY MLT: CPT

## 2024-10-19 PROCEDURE — 80074 ACUTE HEPATITIS PANEL: CPT

## 2024-10-19 PROCEDURE — 74177 CT ABD & PELVIS W/CONTRAST: CPT

## 2024-10-19 PROCEDURE — 6370000000 HC RX 637 (ALT 250 FOR IP): Performed by: INTERNAL MEDICINE

## 2024-10-19 PROCEDURE — 85025 COMPLETE CBC W/AUTO DIFF WBC: CPT

## 2024-10-19 PROCEDURE — 6360000002 HC RX W HCPCS: Performed by: HOSPITALIST

## 2024-10-19 PROCEDURE — 2580000003 HC RX 258: Performed by: HOSPITALIST

## 2024-10-19 PROCEDURE — 87210 SMEAR WET MOUNT SALINE/INK: CPT

## 2024-10-19 PROCEDURE — 83605 ASSAY OF LACTIC ACID: CPT

## 2024-10-19 PROCEDURE — 82962 GLUCOSE BLOOD TEST: CPT

## 2024-10-19 PROCEDURE — 6360000004 HC RX CONTRAST MEDICATION: Performed by: HOSPITALIST

## 2024-10-19 PROCEDURE — 87040 BLOOD CULTURE FOR BACTERIA: CPT

## 2024-10-19 PROCEDURE — 93010 ELECTROCARDIOGRAM REPORT: CPT | Performed by: SPECIALIST

## 2024-10-19 PROCEDURE — 80053 COMPREHEN METABOLIC PANEL: CPT

## 2024-10-19 PROCEDURE — 1100000000 HC RM PRIVATE

## 2024-10-19 PROCEDURE — 6360000002 HC RX W HCPCS: Performed by: EMERGENCY MEDICINE

## 2024-10-19 PROCEDURE — 2580000003 HC RX 258: Performed by: EMERGENCY MEDICINE

## 2024-10-19 PROCEDURE — 36415 COLL VENOUS BLD VENIPUNCTURE: CPT

## 2024-10-19 RX ORDER — ENOXAPARIN SODIUM 100 MG/ML
40 INJECTION SUBCUTANEOUS DAILY
Status: DISCONTINUED | OUTPATIENT
Start: 2024-10-19 | End: 2024-10-24 | Stop reason: HOSPADM

## 2024-10-19 RX ORDER — CARVEDILOL 6.25 MG/1
6.25 TABLET ORAL 2 TIMES DAILY WITH MEALS
Status: DISCONTINUED | OUTPATIENT
Start: 2024-10-19 | End: 2024-10-24 | Stop reason: HOSPADM

## 2024-10-19 RX ORDER — METRONIDAZOLE 250 MG/1
500 TABLET ORAL EVERY 12 HOURS SCHEDULED
Status: DISCONTINUED | OUTPATIENT
Start: 2024-10-19 | End: 2024-10-19

## 2024-10-19 RX ORDER — QUETIAPINE FUMARATE 25 MG/1
25 TABLET, FILM COATED ORAL EVERY 8 HOURS
Status: DISCONTINUED | OUTPATIENT
Start: 2024-10-19 | End: 2024-10-19

## 2024-10-19 RX ORDER — PRIMIDONE 50 MG/1
50 TABLET ORAL 2 TIMES DAILY
Status: DISCONTINUED | OUTPATIENT
Start: 2024-10-19 | End: 2024-10-24 | Stop reason: HOSPADM

## 2024-10-19 RX ORDER — INSULIN LISPRO 100 [IU]/ML
15 INJECTION, SOLUTION INTRAVENOUS; SUBCUTANEOUS
Status: DISCONTINUED | OUTPATIENT
Start: 2024-10-19 | End: 2024-10-24 | Stop reason: HOSPADM

## 2024-10-19 RX ORDER — ONDANSETRON 2 MG/ML
4 INJECTION INTRAMUSCULAR; INTRAVENOUS EVERY 6 HOURS PRN
Status: DISCONTINUED | OUTPATIENT
Start: 2024-10-19 | End: 2024-10-24 | Stop reason: HOSPADM

## 2024-10-19 RX ORDER — POLYETHYLENE GLYCOL 3350 17 G/17G
17 POWDER, FOR SOLUTION ORAL DAILY PRN
Status: DISCONTINUED | OUTPATIENT
Start: 2024-10-19 | End: 2024-10-24 | Stop reason: HOSPADM

## 2024-10-19 RX ORDER — DEXTROSE MONOHYDRATE 100 MG/ML
INJECTION, SOLUTION INTRAVENOUS CONTINUOUS PRN
Status: DISCONTINUED | OUTPATIENT
Start: 2024-10-19 | End: 2024-10-24 | Stop reason: HOSPADM

## 2024-10-19 RX ORDER — ESCITALOPRAM OXALATE 10 MG/1
10 TABLET ORAL DAILY
Status: DISCONTINUED | OUTPATIENT
Start: 2024-10-19 | End: 2024-10-24 | Stop reason: HOSPADM

## 2024-10-19 RX ORDER — CEPHALEXIN 500 MG/1
500 CAPSULE ORAL 3 TIMES DAILY
Qty: 30 CAPSULE | Refills: 0 | Status: SHIPPED | OUTPATIENT
Start: 2024-10-19 | End: 2024-10-23 | Stop reason: HOSPADM

## 2024-10-19 RX ORDER — ACETAMINOPHEN 325 MG/1
650 TABLET ORAL EVERY 6 HOURS PRN
Status: DISCONTINUED | OUTPATIENT
Start: 2024-10-19 | End: 2024-10-24 | Stop reason: HOSPADM

## 2024-10-19 RX ORDER — QUETIAPINE FUMARATE 25 MG/1
25 TABLET, FILM COATED ORAL EVERY 8 HOURS PRN
Status: DISCONTINUED | OUTPATIENT
Start: 2024-10-19 | End: 2024-10-24 | Stop reason: HOSPADM

## 2024-10-19 RX ORDER — 0.9 % SODIUM CHLORIDE 0.9 %
1000 INTRAVENOUS SOLUTION INTRAVENOUS ONCE
Status: COMPLETED | OUTPATIENT
Start: 2024-10-19 | End: 2024-10-19

## 2024-10-19 RX ORDER — POTASSIUM CHLORIDE 750 MG/1
40 TABLET, EXTENDED RELEASE ORAL PRN
Status: ACTIVE | OUTPATIENT
Start: 2024-10-19 | End: 2024-10-24

## 2024-10-19 RX ORDER — ACETAMINOPHEN 650 MG/1
650 SUPPOSITORY RECTAL EVERY 6 HOURS PRN
Status: DISCONTINUED | OUTPATIENT
Start: 2024-10-19 | End: 2024-10-24 | Stop reason: HOSPADM

## 2024-10-19 RX ORDER — SODIUM CHLORIDE 0.9 % (FLUSH) 0.9 %
5-40 SYRINGE (ML) INJECTION PRN
Status: DISCONTINUED | OUTPATIENT
Start: 2024-10-19 | End: 2024-10-24 | Stop reason: HOSPADM

## 2024-10-19 RX ORDER — ONDANSETRON 4 MG/1
4 TABLET, ORALLY DISINTEGRATING ORAL EVERY 8 HOURS PRN
Status: DISCONTINUED | OUTPATIENT
Start: 2024-10-19 | End: 2024-10-24 | Stop reason: HOSPADM

## 2024-10-19 RX ORDER — POTASSIUM CHLORIDE 7.45 MG/ML
10 INJECTION INTRAVENOUS PRN
Status: ACTIVE | OUTPATIENT
Start: 2024-10-19 | End: 2024-10-24

## 2024-10-19 RX ORDER — PANTOPRAZOLE SODIUM 40 MG/1
40 TABLET, DELAYED RELEASE ORAL
Status: DISCONTINUED | OUTPATIENT
Start: 2024-10-19 | End: 2024-10-24 | Stop reason: HOSPADM

## 2024-10-19 RX ORDER — GABAPENTIN 100 MG/1
100 CAPSULE ORAL 2 TIMES DAILY
Status: DISCONTINUED | OUTPATIENT
Start: 2024-10-19 | End: 2024-10-22

## 2024-10-19 RX ORDER — SODIUM CHLORIDE 9 MG/ML
INJECTION, SOLUTION INTRAVENOUS PRN
Status: DISCONTINUED | OUTPATIENT
Start: 2024-10-19 | End: 2024-10-24 | Stop reason: HOSPADM

## 2024-10-19 RX ORDER — SODIUM CHLORIDE 0.9 % (FLUSH) 0.9 %
5-40 SYRINGE (ML) INJECTION EVERY 12 HOURS SCHEDULED
Status: DISCONTINUED | OUTPATIENT
Start: 2024-10-19 | End: 2024-10-24 | Stop reason: HOSPADM

## 2024-10-19 RX ORDER — ACETAMINOPHEN 325 MG/1
650 TABLET ORAL EVERY 6 HOURS PRN
Status: DISCONTINUED | OUTPATIENT
Start: 2024-10-19 | End: 2024-10-23

## 2024-10-19 RX ORDER — INSULIN GLARGINE 100 [IU]/ML
30 INJECTION, SOLUTION SUBCUTANEOUS DAILY
Status: DISCONTINUED | OUTPATIENT
Start: 2024-10-19 | End: 2024-10-24 | Stop reason: HOSPADM

## 2024-10-19 RX ORDER — METRONIDAZOLE 250 MG/1
500 TABLET ORAL EVERY 12 HOURS SCHEDULED
Status: DISCONTINUED | OUTPATIENT
Start: 2024-10-19 | End: 2024-10-20

## 2024-10-19 RX ORDER — SODIUM CHLORIDE 9 MG/ML
INJECTION, SOLUTION INTRAVENOUS CONTINUOUS
Status: DISPENSED | OUTPATIENT
Start: 2024-10-19 | End: 2024-10-20

## 2024-10-19 RX ORDER — LIDOCAINE 4 G/G
1 PATCH TOPICAL DAILY
Status: DISCONTINUED | OUTPATIENT
Start: 2024-10-19 | End: 2024-10-19

## 2024-10-19 RX ORDER — MONTELUKAST SODIUM 10 MG/1
10 TABLET ORAL NIGHTLY
Status: DISCONTINUED | OUTPATIENT
Start: 2024-10-19 | End: 2024-10-24 | Stop reason: HOSPADM

## 2024-10-19 RX ORDER — MAGNESIUM SULFATE IN WATER 40 MG/ML
2000 INJECTION, SOLUTION INTRAVENOUS PRN
Status: DISCONTINUED | OUTPATIENT
Start: 2024-10-19 | End: 2024-10-24 | Stop reason: HOSPADM

## 2024-10-19 RX ORDER — DIPHENHYDRAMINE HYDROCHLORIDE 50 MG/ML
25 INJECTION INTRAMUSCULAR; INTRAVENOUS
Status: COMPLETED | OUTPATIENT
Start: 2024-10-19 | End: 2024-10-19

## 2024-10-19 RX ORDER — LIDOCAINE 4 G/G
1 PATCH TOPICAL DAILY
Status: DISCONTINUED | OUTPATIENT
Start: 2024-10-19 | End: 2024-10-24 | Stop reason: HOSPADM

## 2024-10-19 RX ORDER — IOPAMIDOL 755 MG/ML
100 INJECTION, SOLUTION INTRAVASCULAR
Status: COMPLETED | OUTPATIENT
Start: 2024-10-19 | End: 2024-10-19

## 2024-10-19 RX ADMIN — MEROPENEM 1000 MG: 1 INJECTION INTRAVENOUS at 09:18

## 2024-10-19 RX ADMIN — INSULIN LISPRO 15 UNITS: 100 INJECTION, SOLUTION INTRAVENOUS; SUBCUTANEOUS at 09:03

## 2024-10-19 RX ADMIN — METRONIDAZOLE 500 MG: 250 TABLET ORAL at 21:07

## 2024-10-19 RX ADMIN — MONTELUKAST 10 MG: 10 TABLET, FILM COATED ORAL at 21:07

## 2024-10-19 RX ADMIN — SODIUM CHLORIDE 1000 MG: 9 INJECTION INTRAMUSCULAR; INTRAVENOUS; SUBCUTANEOUS at 00:46

## 2024-10-19 RX ADMIN — PRIMIDONE 50 MG: 50 TABLET ORAL at 09:01

## 2024-10-19 RX ADMIN — IOPAMIDOL 100 ML: 755 INJECTION, SOLUTION INTRAVENOUS at 06:30

## 2024-10-19 RX ADMIN — POTASSIUM BICARBONATE 40 MEQ: 782 TABLET, EFFERVESCENT ORAL at 09:01

## 2024-10-19 RX ADMIN — ACETAMINOPHEN 650 MG: 325 TABLET ORAL at 18:56

## 2024-10-19 RX ADMIN — SODIUM CHLORIDE 1000 ML: 9 INJECTION, SOLUTION INTRAVENOUS at 05:27

## 2024-10-19 RX ADMIN — SODIUM CHLORIDE, PRESERVATIVE FREE 10 ML: 5 INJECTION INTRAVENOUS at 21:07

## 2024-10-19 RX ADMIN — ESCITALOPRAM OXALATE 10 MG: 10 TABLET ORAL at 09:00

## 2024-10-19 RX ADMIN — DICLOFENAC SODIUM 2 G: 10 GEL TOPICAL at 05:08

## 2024-10-19 RX ADMIN — DIPHENHYDRAMINE HYDROCHLORIDE 25 MG: 50 INJECTION INTRAMUSCULAR; INTRAVENOUS at 00:48

## 2024-10-19 RX ADMIN — CARVEDILOL 6.25 MG: 6.25 TABLET, FILM COATED ORAL at 17:27

## 2024-10-19 RX ADMIN — SODIUM CHLORIDE, PRESERVATIVE FREE 10 ML: 5 INJECTION INTRAVENOUS at 09:02

## 2024-10-19 RX ADMIN — GABAPENTIN 100 MG: 100 CAPSULE ORAL at 21:07

## 2024-10-19 RX ADMIN — PREDNISONE 30 MG: 20 TABLET ORAL at 09:11

## 2024-10-19 RX ADMIN — DICLOFENAC SODIUM 2 G: 10 GEL TOPICAL at 17:30

## 2024-10-19 RX ADMIN — DICLOFENAC SODIUM 2 G: 10 GEL TOPICAL at 09:02

## 2024-10-19 RX ADMIN — SODIUM CHLORIDE: 9 INJECTION, SOLUTION INTRAVENOUS at 06:49

## 2024-10-19 RX ADMIN — CARVEDILOL 6.25 MG: 6.25 TABLET, FILM COATED ORAL at 09:00

## 2024-10-19 RX ADMIN — PANTOPRAZOLE SODIUM 40 MG: 40 TABLET, DELAYED RELEASE ORAL at 06:49

## 2024-10-19 RX ADMIN — DICLOFENAC SODIUM 2 G: 10 GEL TOPICAL at 21:08

## 2024-10-19 RX ADMIN — PRIMIDONE 50 MG: 50 TABLET ORAL at 21:07

## 2024-10-19 RX ADMIN — SODIUM CHLORIDE 1000 ML: 9 INJECTION, SOLUTION INTRAVENOUS at 02:38

## 2024-10-19 RX ADMIN — INSULIN LISPRO 15 UNITS: 100 INJECTION, SOLUTION INTRAVENOUS; SUBCUTANEOUS at 11:57

## 2024-10-19 RX ADMIN — ONDANSETRON 4 MG: 2 INJECTION INTRAMUSCULAR; INTRAVENOUS at 09:22

## 2024-10-19 RX ADMIN — GABAPENTIN 100 MG: 100 CAPSULE ORAL at 08:59

## 2024-10-19 RX ADMIN — MEROPENEM 1000 MG: 1 INJECTION INTRAVENOUS at 17:29

## 2024-10-19 RX ADMIN — INSULIN GLARGINE 30 UNITS: 100 INJECTION, SOLUTION SUBCUTANEOUS at 09:04

## 2024-10-19 RX ADMIN — INSULIN HUMAN 55 UNITS: 100 INJECTION, SUSPENSION SUBCUTANEOUS at 09:03

## 2024-10-19 RX ADMIN — ACETAMINOPHEN 650 MG: 325 TABLET ORAL at 03:27

## 2024-10-19 RX ADMIN — INSULIN LISPRO 15 UNITS: 100 INJECTION, SOLUTION INTRAVENOUS; SUBCUTANEOUS at 17:26

## 2024-10-19 ASSESSMENT — PAIN DESCRIPTION - LOCATION
LOCATION: LEG
LOCATION: BACK
LOCATION: BACK
LOCATION: LEG
LOCATION: BACK

## 2024-10-19 ASSESSMENT — PAIN SCALES - GENERAL
PAINLEVEL_OUTOF10: 4
PAINLEVEL_OUTOF10: 5
PAINLEVEL_OUTOF10: 5
PAINLEVEL_OUTOF10: 7
PAINLEVEL_OUTOF10: 3

## 2024-10-19 ASSESSMENT — PAIN DESCRIPTION - DESCRIPTORS
DESCRIPTORS: ACHING
DESCRIPTORS: ACHING;SORE

## 2024-10-19 ASSESSMENT — PAIN DESCRIPTION - ORIENTATION
ORIENTATION: MID
ORIENTATION: LOWER
ORIENTATION: RIGHT;LEFT
ORIENTATION: LOWER;LEFT
ORIENTATION: RIGHT;LEFT

## 2024-10-19 ASSESSMENT — PAIN DESCRIPTION - FREQUENCY: FREQUENCY: INTERMITTENT

## 2024-10-19 ASSESSMENT — PAIN DESCRIPTION - PAIN TYPE: TYPE: CHRONIC PAIN

## 2024-10-19 NOTE — PROGRESS NOTES
0156 Received pt from ED     0230 Cultures and labs ordered.     0238 1L bolus started.     0310 Labs and blood cultures attempted. Successful with 1 set of cultures. RRT RN notified for an attempt to get second set of cultures, labs, and IV. Patient refused. Dr. Horvath made aware. Explained the risk of delay of care.      0345  IV infiltrated. Pt refusing attempt for another IV.       0400 Dr. Horvath at bedside to explain importance of IV. Pt agreeable. RRT RN notified. IV placed. NS Bolus restarted.       0513 Second set of cultures obtained and labs sent.     0623 Pt to CT.     0649 Pt back to floor. Attached to tele.

## 2024-10-19 NOTE — H&P
Calcium 9.2 8.5 - 10.1 MG/DL   Troponin    Collection Time: 10/18/24  8:31 PM   Result Value Ref Range    Troponin, High Sensitivity 11 0 - 51 ng/L   Troponin    Collection Time: 10/18/24  9:32 PM   Result Value Ref Range    Troponin, High Sensitivity 13 0 - 51 ng/L   CBC with Auto Differential    Collection Time: 10/18/24  9:32 PM   Result Value Ref Range    WBC 14.9 (H) 3.6 - 11.0 K/uL    RBC 4.59 3.80 - 5.20 M/uL    Hemoglobin 15.4 11.5 - 16.0 g/dL    Hematocrit 45.4 35.0 - 47.0 %    MCV 98.9 80.0 - 99.0 FL    MCH 33.6 26.0 - 34.0 PG    MCHC 33.9 30.0 - 36.5 g/dL    RDW 13.7 11.5 - 14.5 %    Platelets 278 150 - 400 K/uL    MPV 10.1 8.9 - 12.9 FL    Nucleated RBCs 0.0 0  WBC    nRBC 0.00 0.00 - 0.01 K/uL    Neutrophils % 92 (H) 32 - 75 %    Lymphocytes % 4 (L) 12 - 49 %    Monocytes % 3 (L) 5 - 13 %    Eosinophils % 0 0 - 7 %    Basophils % 0 0 - 1 %    Immature Granulocytes % 1 (H) 0.0 - 0.5 %    Neutrophils Absolute 13.8 (H) 1.8 - 8.0 K/UL    Lymphocytes Absolute 0.6 (L) 0.8 - 3.5 K/UL    Monocytes Absolute 0.4 0.0 - 1.0 K/UL    Eosinophils Absolute 0.0 0.0 - 0.4 K/UL    Basophils Absolute 0.0 0.0 - 0.1 K/UL    Immature Granulocytes Absolute 0.1 (H) 0.00 - 0.04 K/UL    Differential Type SMEAR SCANNED      RBC Comment NORMOCYTIC, NORMOCHROMIC     Comprehensive Metabolic Panel    Collection Time: 10/18/24  9:32 PM   Result Value Ref Range    Sodium 138 136 - 145 mmol/L    Potassium 4.2 3.5 - 5.1 mmol/L    Chloride 109 (H) 97 - 108 mmol/L    CO2 22 21 - 32 mmol/L    Anion Gap 7 2 - 12 mmol/L    Glucose 243 (H) 65 - 100 mg/dL    BUN 16 6 - 20 MG/DL    Creatinine 0.95 0.55 - 1.02 MG/DL    BUN/Creatinine Ratio 17 12 - 20      Est, Glom Filt Rate 62 >60 ml/min/1.73m2    Calcium 8.1 (L) 8.5 - 10.1 MG/DL    Total Bilirubin 2.6 (H) 0.2 - 1.0 MG/DL     (H) 12 - 78 U/L     (H) 15 - 37 U/L    Alk Phosphatase 206 (H) 45 - 117 U/L    Total Protein 6.3 (L) 6.4 - 8.2 g/dL    Albumin 2.8 (L) 3.5 - 5.0 g/dL     Globulin 3.5 2.0 - 4.0 g/dL    Albumin/Globulin Ratio 0.8 (L) 1.1 - 2.2     Lipase    Collection Time: 10/18/24  9:32 PM   Result Value Ref Range    Lipase 65 13 - 75 U/L   Urinalysis with Microscopic    Collection Time: 10/18/24 11:03 PM   Result Value Ref Range    Color, UA DARK YELLOW      Appearance CLOUDY (A) CLEAR      Specific Gravity, UA 1.029 1.003 - 1.030      pH, Urine 5.5 5.0 - 8.0      Protein, UA TRACE (A) NEG mg/dL    Glucose, Ur >1000 (A) NEG mg/dL    Ketones, Urine TRACE (A) NEG mg/dL    Bilirubin, Urine Negative NEG      Blood, Urine TRACE (A) NEG      Urobilinogen, Urine 1.0 0.2 - 1.0 EU/dL    Nitrite, Urine Positive (A) NEG      Leukocyte Esterase, Urine MODERATE (A) NEG      WBC, UA >100 (H) 0 - 4 /hpf    RBC, UA 20-50 0 - 5 /hpf    Epithelial Cells, UA FEW FEW /lpf    BACTERIA, URINE 4+ (A) NEG /hpf    Hyaline Casts, UA 0-2 0 - 5 /lpf    Yeast, UA PRESENT (A) NEG     Troponin    Collection Time: 10/18/24 11:12 PM   Result Value Ref Range    Troponin, High Sensitivity 14 0 - 51 ng/L   Lactic Acid    Collection Time: 10/19/24 12:43 AM   Result Value Ref Range    Lactic Acid, Plasma 3.8 (HH) 0.4 - 2.0 MMOL/L         CT Result (most recent):  CT ABDOMEN PELVIS W IV CONTRAST 06/18/2024    Narrative  EXAM: CT ABDOMEN PELVIS W IV CONTRAST    INDICATION: LLQ pain and diarrhea    COMPARISON: None    CONTRAST: 100 mL of Isovue-370.    ORAL CONTRAST: None    TECHNIQUE:  Following intravenous administration of contrast, thin axial images were  obtained through the abdomen and pelvis. Coronal and sagittal reconstructions  were generated. CT dose reduction was achieved through use of a standardized  protocol tailored for this examination and automatic exposure control for dose  modulation.    FINDINGS:  LOWER THORAX: No significant abnormality in the incidentally imaged lower chest.  LIVER: No mass.  BILIARY TREE: Gallbladder is within normal limits. CBD is not dilated.  SPLEEN: within normal

## 2024-10-19 NOTE — ED TRIAGE NOTES
Mid -sternal CP since 1700 after eating chicken. Pain subsided with in route after burping. Patient stated pain returned on arrival. Patient moaning and diaphoretic on arrival

## 2024-10-19 NOTE — PLAN OF CARE
Problem: Pain  Goal: Verbalizes/displays adequate comfort level or baseline comfort level  10/19/2024 0959 by Anna Marie Ruffin, RN  Outcome: Progressing  10/19/2024 0959 by Anna Marie Ruffin, RN  Outcome: Progressing

## 2024-10-19 NOTE — ED NOTES
TRANSFER - OUT REPORT:    Verbal report given to Thom on Siomara Collins  being transferred to [facility] for routine progression of patient care       Report consisted of patient's Situation, Background, Assessment and   Recommendations(SBAR).     Information from the following report(s) Nurse Handoff Report, ED SBAR, MAR, Recent Results, Quality Measures, and Neuro Assessment was reviewed with the receiving nurse.    [place] Fall Assessment:    Presents to emergency department  because of falls (Syncope, seizure, or loss of consciousness): No  Age > 70: Yes  Altered Mental Status, Intoxication with alcohol or substance confusion (Disorientation, impaired judgment, poor safety awaremess, or inability to follow instructions): No  Impaired Mobility: Ambulates or transfers with assistive devices or assistance; Unable to ambulate or transer.: Yes  Nursing Judgement: Yes          Lines:   Peripheral IV 10/18/24 Right Antecubital (Active)        Opportunity for questions and clarification was provided.      Patient transported with:  Tech

## 2024-10-19 NOTE — ED PROVIDER NOTES
[facility] EMERGENCY DEPT  EMERGENCY DEPARTMENT ENCOUNTER      Pt Name: Siomara Collins  MRN: [id number]  Birthdate 1945  Date of evaluation: 10/18/2024  Provider: Solitario Vidales MD    CHIEF COMPLAINT       Chief Complaint   Patient presents with    Chest Pain         HISTORY OF PRESENT ILLNESS   (Location/Symptom, Timing/Onset, Context/Setting, Quality, Duration, Modifying Factors, Severity)  Note limiting factors.   79-year-old female presents with midsternal chest pain since 5 PM today.  She noted after she ate some chicken.  Seem to subside and during her EMS transport and reoccurred when she got to the ER.  Patient notes that she is very anxious and trembling.  Recently discharged from the hospital where she was in for 10 days for hypotension and recently returned to her assisted living facility.  No history of cardiac events or A-fib.  No history of stenting or coronary bypass.    The history is provided by the patient and a relative.   Chest Pain  Pain location:  Substernal area  Pain quality: aching and dull    Pain radiates to:  Does not radiate  Pain severity:  Mild  Onset quality:  Gradual  Duration:  4 hours  Timing:  Constant  Progression:  Waxing and waning  Chronicity:  New  Context: not breathing, not drug use, not eating, not lifting, not movement, not raising an arm and not at rest    Relieved by:  Nothing  Worsened by:  Nothing  Ineffective treatments:  None tried  Associated symptoms: no abdominal pain, no AICD problem, no altered mental status, no anorexia, no back pain, no claudication, no cough, no diaphoresis, no dizziness, no dysphagia, no fever, no headache, no heartburn, no lower extremity edema, no nausea, no numbness, no orthopnea, no palpitations, no PND, no shortness of breath, no syncope and no vomiting          Review of External Medical Records:     Nursing Notes were reviewed.    REVIEW OF SYSTEMS    (2-9 systems for level 4, 10 or more for level 5)     Review of Systems  10/19/2024 12:24:54 AM      PATIENT REFERRED TO:  Irvin Vang MD  [address] [facility] [address] [phone]    Call       [facility] EMERGENCY DEPT  [address] [place] [address]  [phone]    As needed, If symptoms worsen      DISCHARGE MEDICATIONS:  New Prescriptions    CEPHALEXIN (KEFLEX) 500 MG CAPSULE    Take 1 capsule by mouth 3 times daily for 10 days         (Please note that portions of this note were completed with a voice recognition program.  Efforts were made to edit the dictations but occasionally words are mis-transcribed.)    Solitario Vidales MD (electronically signed)  Emergency Attending Physician / Physician Assistant / Nurse Practitioner              Solitario Vidales MD  10/19/24 0007       Solitario Vidales MD  10/19/24 0030

## 2024-10-19 NOTE — DISCHARGE INSTRUCTIONS
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  Diagnosis: ESBL Klebsiella UTI  2.  Antibiotic: Meropenem 1 g IV every 8 hours through 10/25/2024  3.  Routine peripheral IV care.  Please remove IV once antibiotics are complete   4.  Weekly labs: None  5. Allergies:      Recommended diet:  diabetic diet    Recommended activity: Activity as tolerated    Follow Up:    Irvin Vang MD  [address] [facility] [address] [phone]    Go on 10/25/2024  Hospital follow up with PCP set for  at 2:30 PM with Doctor Vang at their [place] location.    [facility] EMERGENCY DEPT  [address] [facility] [address]  [phone]    As needed, If symptoms worsen      Information obtained by :  I understand that if any problems occur once I am at home I am to contact my physician.    I understand and acknowledge receipt of the instructions indicated above.                                                                                                                                           Physician's or R.N.'s Signature                                                                  Date/Time

## 2024-10-19 NOTE — PROGRESS NOTES
JORDYN RALPH [facility]  [address] [place] [address] [phone]      Hospitalist Progress Note      NAME: Siomara Collins   :  1945  MRM:  [id number]    Date of service: 10/19/2024  10:24 AM       Assessment and Plan:   Severe sepsis, POA due to UTI; WBC 14.9, , lactic 3.8 on admission.  Check blood and urine culture.  Continue meropenem due to history of ESBL Klebsiella UTI      2.  Elevated LFTs/generalized abdominal pain.  CT of the abdomen without acute finding.  Continue to hold Tricor and Crestor.  Check hepatitis panel and ultrasound of the liver     3.  HTN.  Continue Coreg.     4.  DM.  Continue Lantus and NPH.  Follows with endocrinology     5.  Neuropathy.  Cont neurontin, lidocaine patch, diclofenac gel     6.  PMR-cont prednisone 30mg daily         Subjective:     Chief Complaint:: Patient was seen and examined as a follow up for severe.  Chart was reviewed.  C/O generalized abdominal pain, but denies nausea or vomiting    ROS:  (bold if positive, if negative)    Tolerating PT  Tolerating Diet     v   Objective:     Last 24hrs VS reviewed since prior progress note. Most recent are:    Vitals:    10/19/24 0736   BP: 113/61   Pulse: (!) 110   Resp: 20   Temp: 100.4 °F (38 °C)   SpO2: 97%     SpO2 Readings from Last 6 Encounters:   10/19/24 97%   09/15/24 94%   24 94%   24 96%   24 97%   24 95%        No intake or output data in the 24 hours ending 10/19/24 1024     Physical Exam:    Gen:  Well-developed, well-nourished, in no acute distress  HEENT:  Pink conjunctivae, PERRL, hearing intact to voice, moist mucous membranes  Neck:  Supple, without masses, thyroid non-tender  Resp:  No accessory muscle use, clear breath sounds without wheezes rales or rhonchi  Card:  No murmurs, normal S1, S2 without thrills, bruits or peripheral edema  Abd:  Soft, non-tender, non-distended, normoactive bowel sounds are present, no palpable

## 2024-10-20 ENCOUNTER — APPOINTMENT (OUTPATIENT)
Facility: HOSPITAL | Age: 79
DRG: 871 | End: 2024-10-20
Payer: MEDICARE

## 2024-10-20 LAB
ALBUMIN SERPL-MCNC: 2.5 G/DL (ref 3.5–5)
ALBUMIN/GLOB SERPL: 1 (ref 1.1–2.2)
ALP SERPL-CCNC: 192 U/L (ref 45–117)
ALT SERPL-CCNC: 217 U/L (ref 12–78)
ANION GAP SERPL CALC-SCNC: 5 MMOL/L (ref 2–12)
AST SERPL-CCNC: 273 U/L (ref 15–37)
BASOPHILS # BLD: 0 K/UL (ref 0–0.1)
BASOPHILS NFR BLD: 0 % (ref 0–1)
BILIRUB SERPL-MCNC: 3.9 MG/DL (ref 0.2–1)
BUN SERPL-MCNC: 13 MG/DL (ref 6–20)
BUN/CREAT SERPL: 25 (ref 12–20)
CALCIUM SERPL-MCNC: 7.7 MG/DL (ref 8.5–10.1)
CHLORIDE SERPL-SCNC: 111 MMOL/L (ref 97–108)
CO2 SERPL-SCNC: 27 MMOL/L (ref 21–32)
CREAT SERPL-MCNC: 0.51 MG/DL (ref 0.55–1.02)
DIFFERENTIAL METHOD BLD: ABNORMAL
EOSINOPHIL # BLD: 0 K/UL (ref 0–0.4)
EOSINOPHIL NFR BLD: 0 % (ref 0–7)
ERYTHROCYTE [DISTWIDTH] IN BLOOD BY AUTOMATED COUNT: 13.7 % (ref 11.5–14.5)
GLOBULIN SER CALC-MCNC: 2.6 G/DL (ref 2–4)
GLUCOSE BLD STRIP.AUTO-MCNC: 155 MG/DL (ref 65–117)
GLUCOSE BLD STRIP.AUTO-MCNC: 169 MG/DL (ref 65–117)
GLUCOSE BLD STRIP.AUTO-MCNC: 175 MG/DL (ref 65–117)
GLUCOSE BLD STRIP.AUTO-MCNC: 197 MG/DL (ref 65–117)
GLUCOSE BLD STRIP.AUTO-MCNC: 221 MG/DL (ref 65–117)
GLUCOSE SERPL-MCNC: 171 MG/DL (ref 65–100)
HCT VFR BLD AUTO: 36.1 % (ref 35–47)
HGB BLD-MCNC: 12 G/DL (ref 11.5–16)
IMM GRANULOCYTES # BLD AUTO: 0 K/UL (ref 0–0.04)
IMM GRANULOCYTES NFR BLD AUTO: 0 % (ref 0–0.5)
LYMPHOCYTES # BLD: 0.4 K/UL (ref 0.8–3.5)
LYMPHOCYTES NFR BLD: 5 % (ref 12–49)
MCH RBC QN AUTO: 34 PG (ref 26–34)
MCHC RBC AUTO-ENTMCNC: 33.2 G/DL (ref 30–36.5)
MCV RBC AUTO: 102.3 FL (ref 80–99)
MONOCYTES # BLD: 0.4 K/UL (ref 0–1)
MONOCYTES NFR BLD: 5 % (ref 5–13)
NEUTS SEG # BLD: 6.2 K/UL (ref 1.8–8)
NEUTS SEG NFR BLD: 90 % (ref 32–75)
NRBC # BLD: 0 K/UL (ref 0–0.01)
NRBC BLD-RTO: 0 PER 100 WBC
PLATELET # BLD AUTO: 161 K/UL (ref 150–400)
PMV BLD AUTO: 10 FL (ref 8.9–12.9)
POTASSIUM SERPL-SCNC: 3.4 MMOL/L (ref 3.5–5.1)
PROT SERPL-MCNC: 5.1 G/DL (ref 6.4–8.2)
RBC # BLD AUTO: 3.53 M/UL (ref 3.8–5.2)
RBC MORPH BLD: ABNORMAL
SERVICE CMNT-IMP: ABNORMAL
SODIUM SERPL-SCNC: 143 MMOL/L (ref 136–145)
WBC # BLD AUTO: 7 K/UL (ref 3.6–11)

## 2024-10-20 PROCEDURE — 6360000002 HC RX W HCPCS: Performed by: HOSPITALIST

## 2024-10-20 PROCEDURE — 97165 OT EVAL LOW COMPLEX 30 MIN: CPT | Performed by: OCCUPATIONAL THERAPIST

## 2024-10-20 PROCEDURE — 94761 N-INVAS EAR/PLS OXIMETRY MLT: CPT

## 2024-10-20 PROCEDURE — 97535 SELF CARE MNGMENT TRAINING: CPT | Performed by: OCCUPATIONAL THERAPIST

## 2024-10-20 PROCEDURE — 2580000003 HC RX 258: Performed by: HOSPITALIST

## 2024-10-20 PROCEDURE — 82962 GLUCOSE BLOOD TEST: CPT

## 2024-10-20 PROCEDURE — 1100000000 HC RM PRIVATE

## 2024-10-20 PROCEDURE — 97530 THERAPEUTIC ACTIVITIES: CPT | Performed by: OCCUPATIONAL THERAPIST

## 2024-10-20 PROCEDURE — 85025 COMPLETE CBC W/AUTO DIFF WBC: CPT

## 2024-10-20 PROCEDURE — 97162 PT EVAL MOD COMPLEX 30 MIN: CPT

## 2024-10-20 PROCEDURE — 97530 THERAPEUTIC ACTIVITIES: CPT

## 2024-10-20 PROCEDURE — 6370000000 HC RX 637 (ALT 250 FOR IP): Performed by: HOSPITALIST

## 2024-10-20 PROCEDURE — 76705 ECHO EXAM OF ABDOMEN: CPT

## 2024-10-20 PROCEDURE — 36415 COLL VENOUS BLD VENIPUNCTURE: CPT

## 2024-10-20 PROCEDURE — 6370000000 HC RX 637 (ALT 250 FOR IP): Performed by: INTERNAL MEDICINE

## 2024-10-20 PROCEDURE — 80053 COMPREHEN METABOLIC PANEL: CPT

## 2024-10-20 RX ORDER — FLUCONAZOLE 100 MG/1
150 TABLET ORAL ONCE
Status: COMPLETED | OUTPATIENT
Start: 2024-10-20 | End: 2024-10-20

## 2024-10-20 RX ORDER — CLOTRIMAZOLE 1 %
CREAM WITH APPLICATOR VAGINAL DAILY
Status: DISCONTINUED | OUTPATIENT
Start: 2024-10-20 | End: 2024-10-24 | Stop reason: HOSPADM

## 2024-10-20 RX ADMIN — MEROPENEM 1000 MG: 1 INJECTION INTRAVENOUS at 08:54

## 2024-10-20 RX ADMIN — PRIMIDONE 50 MG: 50 TABLET ORAL at 21:20

## 2024-10-20 RX ADMIN — POTASSIUM BICARBONATE 40 MEQ: 391 TABLET, EFFERVESCENT ORAL at 08:55

## 2024-10-20 RX ADMIN — GABAPENTIN 100 MG: 100 CAPSULE ORAL at 21:20

## 2024-10-20 RX ADMIN — PRIMIDONE 50 MG: 50 TABLET ORAL at 08:57

## 2024-10-20 RX ADMIN — INSULIN LISPRO 15 UNITS: 100 INJECTION, SOLUTION INTRAVENOUS; SUBCUTANEOUS at 08:55

## 2024-10-20 RX ADMIN — ACETAMINOPHEN 650 MG: 325 TABLET ORAL at 06:18

## 2024-10-20 RX ADMIN — CARVEDILOL 6.25 MG: 6.25 TABLET, FILM COATED ORAL at 16:53

## 2024-10-20 RX ADMIN — DICLOFENAC SODIUM 2 G: 10 GEL TOPICAL at 21:22

## 2024-10-20 RX ADMIN — SODIUM CHLORIDE, PRESERVATIVE FREE 10 ML: 5 INJECTION INTRAVENOUS at 08:54

## 2024-10-20 RX ADMIN — GABAPENTIN 100 MG: 100 CAPSULE ORAL at 08:57

## 2024-10-20 RX ADMIN — DICLOFENAC SODIUM 2 G: 10 GEL TOPICAL at 16:44

## 2024-10-20 RX ADMIN — INSULIN GLARGINE 30 UNITS: 100 INJECTION, SOLUTION SUBCUTANEOUS at 08:56

## 2024-10-20 RX ADMIN — PANTOPRAZOLE SODIUM 40 MG: 40 TABLET, DELAYED RELEASE ORAL at 06:19

## 2024-10-20 RX ADMIN — INSULIN LISPRO 15 UNITS: 100 INJECTION, SOLUTION INTRAVENOUS; SUBCUTANEOUS at 17:03

## 2024-10-20 RX ADMIN — MEROPENEM 1000 MG: 1 INJECTION INTRAVENOUS at 16:43

## 2024-10-20 RX ADMIN — ACETAMINOPHEN 650 MG: 325 TABLET ORAL at 16:54

## 2024-10-20 RX ADMIN — CLOTRIMAZOLE: 1 CREAM VAGINAL at 11:22

## 2024-10-20 RX ADMIN — SODIUM CHLORIDE: 9 INJECTION, SOLUTION INTRAVENOUS at 01:09

## 2024-10-20 RX ADMIN — SODIUM CHLORIDE, PRESERVATIVE FREE 10 ML: 5 INJECTION INTRAVENOUS at 21:21

## 2024-10-20 RX ADMIN — INSULIN HUMAN 55 UNITS: 100 INJECTION, SUSPENSION SUBCUTANEOUS at 08:56

## 2024-10-20 RX ADMIN — ESCITALOPRAM OXALATE 10 MG: 10 TABLET ORAL at 08:56

## 2024-10-20 RX ADMIN — PREDNISONE 30 MG: 20 TABLET ORAL at 08:56

## 2024-10-20 RX ADMIN — MEROPENEM 1000 MG: 1 INJECTION INTRAVENOUS at 01:38

## 2024-10-20 RX ADMIN — INSULIN LISPRO 15 UNITS: 100 INJECTION, SOLUTION INTRAVENOUS; SUBCUTANEOUS at 11:53

## 2024-10-20 RX ADMIN — DICLOFENAC SODIUM 2 G: 10 GEL TOPICAL at 08:57

## 2024-10-20 RX ADMIN — FLUCONAZOLE 150 MG: 100 TABLET ORAL at 08:56

## 2024-10-20 RX ADMIN — DICLOFENAC SODIUM 2 G: 10 GEL TOPICAL at 11:32

## 2024-10-20 RX ADMIN — CARVEDILOL 6.25 MG: 6.25 TABLET, FILM COATED ORAL at 08:56

## 2024-10-20 RX ADMIN — MONTELUKAST 10 MG: 10 TABLET, FILM COATED ORAL at 21:20

## 2024-10-20 ASSESSMENT — PAIN DESCRIPTION - DESCRIPTORS
DESCRIPTORS: ACHING;SORE
DESCRIPTORS: TINGLING;NUMBNESS

## 2024-10-20 ASSESSMENT — PAIN SCALES - GENERAL
PAINLEVEL_OUTOF10: 0
PAINLEVEL_OUTOF10: 5
PAINLEVEL_OUTOF10: 3
PAINLEVEL_OUTOF10: 0
PAINLEVEL_OUTOF10: 4

## 2024-10-20 ASSESSMENT — PAIN DESCRIPTION - LOCATION
LOCATION: LEG
LOCATION: KNEE
LOCATION: LEG
LOCATION: LEG

## 2024-10-20 ASSESSMENT — PAIN DESCRIPTION - ORIENTATION
ORIENTATION: RIGHT;LEFT
ORIENTATION: LEFT
ORIENTATION: LEFT;RIGHT
ORIENTATION: RIGHT;LEFT

## 2024-10-20 ASSESSMENT — PAIN - FUNCTIONAL ASSESSMENT: PAIN_FUNCTIONAL_ASSESSMENT: ACTIVITIES ARE NOT PREVENTED

## 2024-10-20 NOTE — PLAN OF CARE
Problem: Physical Therapy - Adult  Goal: By Discharge: Performs mobility at highest level of function for planned discharge setting.  See evaluation for individualized goals.  Description: FUNCTIONAL STATUS PRIOR TO ADMISSION: The patient  required moderate assistance for basic and instrumental ADLs. and The patient was functional at the wheelchair level and required moderate assistance x2 for transfers to the chair.    HOME SUPPORT PRIOR TO ADMISSION: The patient lived with ALU staff in [facility].    Physical Therapy Goals  Initiated 10/20/2024  1.  Patient will move from supine to sit and sit to supine in bed with contact guard assist within 7 day(s).    2.  Patient will perform sit to stand with moderate assistance x2 within 7 day(s).  3.  Patient will transfer from bed to chair and chair to bed with moderate assistance x2 using the least restrictive device within 7 day(s).  4.  Patient will ambulate with moderate assistance x2 for 3x2 feet with the least restrictive device within 7 day(s).   Outcome: Progressing   PHYSICAL THERAPY EVALUATION    Patient: Siomara Collins (79 y.o. female)  Date: 10/20/2024  Primary Diagnosis: UTI (urinary tract infection), bacterial [N39.0, A49.9]  Urinary tract infection due to ESBL Klebsiella [N39.0, B96.89]  Leukocytosis, unspecified type [D72.829]  Chest pain, unspecified type [R07.9]       Precautions: Restrictions/Precautions: Fall Risk, Contact Precautions                      ASSESSMENT :   DEFICITS/IMPAIRMENTS:   The patient is limited by decreased functional mobility, independence in ADLs, strength, body mechanics, activity tolerance, coordination, balance, increased pain levels .  Pt admitted due to UTI and chest pain.  Chart review stating pt chest pain resolved with large bowel movement.    Based on the impairments listed above pt is very weak and unable to perform baseline transfers with Ax2 PTA.  She is needing Max to Total Ax2 with sit<>stand with

## 2024-10-20 NOTE — PROGRESS NOTES
JORDYN RALPH [facility]  [address] [place] [address] [phone]      Hospitalist Progress Note      NAME: Siomara Collins   :  1945  MRM:  [id number]    Date of service: 10/20/2024  9:04 AM       Assessment and Plan:   Severe sepsis, POA due to UTI; WBC 14.9, , lactic 3.8 on admission.  Check blood and urine culture.  Continue meropenem due to history of ESBL Klebsiella UTI      2.  Elevated LFTs/generalized abdominal pain.  CT of the abdomen without acute finding.  Continue to hold Tricor and Crestor. Hepatitis panel is negative.  Check ultrasound of the liver.  Worsening LFTs.  Continue to monitor LFTs.  Consult GI     3.  HTN.  Continue Coreg.     4.  DM.  Continue Lantus and NPH.  Follows with endocrinology     5.  Neuropathy.  Cont neurontin, lidocaine patch, diclofenac gel     6.  PMR-cont prednisone 30mg daily    7.  Vaginal fungal infection/itchiness.  Wet prep: Clue cells and trichomoniasis are negative.  Just present.  Given fluconazole 150 mg x 1.  Continue miconazole cream.         Subjective:     Chief Complaint:: Patient was seen and examined as a follow up for severe.  Chart was reviewed.  C/O generalized abdominal pain, but denies nausea or vomiting    ROS:  (bold if positive, if negative)    Tolerating PT  Tolerating Diet     v   Objective:     Last 24hrs VS reviewed since prior progress note. Most recent are:    Vitals:    10/20/24 0810   BP: (!) 149/83   Pulse: 76   Resp: 15   Temp: 98.4 °F (36.9 °C)   SpO2: 94%     SpO2 Readings from Last 6 Encounters:   10/20/24 94%   09/15/24 94%   24 94%   24 96%   24 97%   24 95%          Intake/Output Summary (Last 24 hours) at 10/20/2024 0904  Last data filed at 10/19/2024 2107  Gross per 24 hour   Intake 10 ml   Output 625 ml   Net -615 ml        Physical Exam:    Gen:  Well-developed, well-nourished, in no acute distress  HEENT:  Pink conjunctivae, PERRL, hearing intact to voice,

## 2024-10-20 NOTE — PROGRESS NOTES
Spiritual Health History and Assessment/Progress Note  [facility]    Loneliness/Social Isolation,  , Adjustment to illness, Life Adjustments,      Name: Siomara Collins MRN: [id number]    Age: 79 y.o.     Sex: female   Language: English   Jewish: Gnosticist   Urinary tract infection due to ESBL Klebsiella     Date: 10/20/2024            Total Time Calculated: 65 min              Spiritual Assessment began in [facility]        Referral/Consult From: Multi-disciplinary team   Encounter Overview/Reason: Loneliness/Social Isolation  Service Provided For: Patient    Amy, Belief, Meaning:   Patient identifies as spiritual, is connected with a amy tradition or spiritual practice, and has beliefs or practices that help with coping during difficult times  Family/Friends No family/friends present      Importance and Influence:  Patient has spiritual/personal beliefs that influence decisions regarding their health  Family/Friends No family/friends present    Community:  Patient feels well-supported. Support system includes: Children and Friends and expresses feelings of isolation: disconnected from family/friends  Family/Friends No family/friends present    Assessment and Plan of Care:     Patient Interventions include: Facilitated expression of thoughts and feelings, Explored spiritual coping/struggle/distress, Engaged in theological reflection, and Affirmed coping skills/support systems  Family/Friends Interventions include: No family/friends present    Patient Plan of Care: Spiritual Care available upon further referral  Family/Friends Plan of Care: No family/friends present     visit for initial spiritual assessment. Reviewed pt's chart and spoke with pt's nurse. Pt shared medical life events that have resulted in a life different from the future she imagined. Pt franko through the support of her son, her amy in God, and Gnosticist Latter-day practices. Pt tearful as she  fears her care is burdensome, her deep sadness brought by an inability to spend time with her grandchildren in [place], the loss of her home and  down sizing to assisted living as well as complex family dynamics with her daughter. Pt hopeful for return to assisted living and engaging in talk therapy with a counselor.    Electronically signed by CYNTHIA Duggan on 10/20/2024 at 1:22 PM

## 2024-10-20 NOTE — PLAN OF CARE
to SNF at [facility].  Pt then discharge to [facility] on 10/2/24.  Pt requires assist with ADLs and 2 person assist for stand pivot transfers to w/c.  Pt is non-ambulatory.  Receives Help From: Personal care attendant, ADL Assistance: Needs assistance, Bath: Moderate assistance, Dressing: Moderate assistance, Grooming: Supervision, Feeding: Modified independent , Toileting: Needs assistance,  , Ambulation Assistance: Non-ambulatory, Transfer Assistance: Needs assistance (max A x2 per pt), Active : No     HOME SUPPORT: Patient lives in [facility]  .    Occupational Therapy Goals:  Initiated 10/20/2024  1.  Patient will perform upper body dressing seated EOB with Supervision within 7 day(s).  2.  Patient will perform lower body dressing bed level with Moderate Assist within 7 day(s).  3.  Patient will perform toilet transfers with Moderate Assist and Assist x2  within 7 day(s).  4.  Patient will perform all aspects of toileting with Moderate Assist within 7 day(s).  5.  Patient will participate in upper extremity therapeutic exercise/activities with Modified [place] for 10 minutes within 7 day(s).    6.  Patient will utilize energy conservation techniques during functional activities with verbal and visual cues within 7 day(s).    10/20/2024 0849 by Noreen Velazquez, ANA  Outcome: Progressing     Problem: Physical Therapy - Adult  Goal: By Discharge: Performs mobility at highest level of function for planned discharge setting.  See evaluation for individualized goals.  Description: FUNCTIONAL STATUS PRIOR TO ADMISSION: The patient  required moderate assistance for basic and instrumental ADLs. and The patient was functional at the wheelchair level and required moderate assistance x2 for transfers to the chair.    HOME SUPPORT PRIOR TO ADMISSION: The patient lived with [facility] staff in [facility].    Physical Therapy Goals  Initiated 10/20/2024  1.  Patient will move from  supine to sit and sit to supine in bed with contact guard assist within 7 day(s).    2.  Patient will perform sit to stand with moderate assistance x2 within 7 day(s).  3.  Patient will transfer from bed to chair and chair to bed with moderate assistance x2 using the least restrictive device within 7 day(s).  4.  Patient will ambulate with moderate assistance x2 for 3x2 feet with the least restrictive device within 7 day(s).   10/20/2024 0959 by Isa Good, PT  Outcome: Progressing

## 2024-10-20 NOTE — CONSULTS
Sig: Take 1 tablet by mouth 2 times daily (with meals) Hold for sbp <100, MAP <65, or pulse <60   diclofenac sodium (VOLTAREN) 1 % GEL 10/19/2024  No Yes   Sig: Apply 2 g topically in the morning, at noon, in the evening, and at bedtime   escitalopram (LEXAPRO) 10 MG tablet 10/19/2024  No Yes   Sig: Take 1 tablet by mouth daily   ezetimibe (ZETIA) 10 MG tablet   No No   Sig: Take 1 tablet by mouth nightly   gabapentin (NEURONTIN) 100 MG capsule   No No   Sig: Take 1 capsule by mouth 2 times daily for 30 days. Max Daily Amount: 200 mg   insulin NPH (HUMULIN N;NOVOLIN N) 100 UNIT/ML injection pen   No No   Sig: Inject 55 units in the morning at the same time she receives 30 mg of prednisone (dispense novolin or humulin whichever is preferred)--in addition to her lantus   insulin aspart (NOVOLOG FLEXPEN) 100 UNIT/ML injection pen   Yes No   Sig: Inject 15 Units into the skin 3 times daily (before meals) Also has a scale: 200-249=2 units + 2:50 > 250   insulin glargine (LANTUS SOLOSTAR) 100 UNIT/ML injection pen   No No   Sig: Inject 30 Units into the skin daily New lower dose replaces prior script on file for 50 units   lanolin-petrolatum (A+D) ointment   No No   Sig: Apply topically 2 times daily as needed for Dry Skin   lidocaine (HM LIDOCAINE PATCH) 4 % external patch   No No   Sig: Place 1 patch onto the skin daily   loperamide (IMODIUM) 2 MG capsule   Yes No   Sig: Take by mouth as needed   montelukast (SINGULAIR) 10 MG tablet   No No   Sig: Take 1 tablet by mouth nightly   omeprazole (PRILOSEC) 20 MG delayed release capsule   No No   Sig: Take 1 capsule by mouth Daily   predniSONE (DELTASONE) 10 MG tablet   Yes No   Sig: Take 3 tablets by mouth daily   primidone (MYSOLINE) 50 MG tablet   No No   Sig: Take 1 tablet by mouth in the morning and at bedtime   rosuvastatin (CRESTOR) 20 MG tablet   No No   Sig: Take 1 tablet by mouth nightly      Facility-Administered Medications: None       Hospital  Klebsiella  Resolved Problems:    * No resolved hospital problems. *       See above narrative for full detail.

## 2024-10-20 NOTE — CARE COORDINATION
Care Management Initial Assessment  10/20/2024 3:36 PM  If patient is discharged prior to next notation, then this note serves as note for discharge by case management.    Reason for Admission:   UTI (urinary tract infection), bacterial [N39.0, A49.9]  Urinary tract infection due to ESBL Klebsiella [N39.0, B96.89]  Leukocytosis, unspecified type [D72.829]  Chest pain, unspecified type [R07.9]         Patient Admission Status: Inpatient  Date Admitted to INP: 10/19/2024  RUR: Readmission Risk Score: 31.4    Hospitalization in the last 30 days (Readmission):  Yes        Advance Care Planning:  Code Status: DNR  Primary Healthcare Decision Maker:    Primary Decision Maker: OlenasymoneJimi - Child - [phone]   Advance Directive: NOK.. DNR     __________________________________________________________________________  Assessment:      10/20/24 1533   Service Assessment   Patient Orientation Alert and Oriented;Person;Place;Self   Cognition Short Term Memory Deficit   History Provided By Child/Family   Primary Caregiver Other (Comment)  (lives at [facility] [place] for the last 2 years.  Son identified it as the preference as it is home for her and she can get home health and her mental health coverage there, he said.)   Can patient return to prior living arrangement Unknown at present  (previous hospitalization the [facility] required an in house evaluation as to if they could accept her back.  Son is aware of same.  Unable to connect with [facility] today)   Ability to make needs known: Fair         Comments:     Discharge Concerns: [x]Yes []No []Unknown   Describe: son is hopeful she can return to the [facility].  She had been discharged from SNF back to [facility] Tuesday of last week.  She said she thought she was having a heart attack per son, and [facility] sent her to ED.      Financial concerns/barriers: []Yes, explain: [x]No []Unknown/Not

## 2024-10-21 PROBLEM — I10 HTN (HYPERTENSION), BENIGN: Status: ACTIVE | Noted: 2024-10-21

## 2024-10-21 PROBLEM — K76.0 HEPATIC STEATOSIS: Status: ACTIVE | Noted: 2024-10-21

## 2024-10-21 PROBLEM — E11.42 DIABETIC POLYNEUROPATHY ASSOCIATED WITH TYPE 2 DIABETES MELLITUS (HCC): Status: ACTIVE | Noted: 2024-10-21

## 2024-10-21 LAB
ALBUMIN SERPL-MCNC: 2.6 G/DL (ref 3.5–5)
ALBUMIN/GLOB SERPL: 0.9 (ref 1.1–2.2)
ALP SERPL-CCNC: 249 U/L (ref 45–117)
ALT SERPL-CCNC: 199 U/L (ref 12–78)
AST SERPL-CCNC: 175 U/L (ref 15–37)
BILIRUB DIRECT SERPL-MCNC: 1.7 MG/DL (ref 0–0.2)
BILIRUB SERPL-MCNC: 2.3 MG/DL (ref 0.2–1)
GLOBULIN SER CALC-MCNC: 2.9 G/DL (ref 2–4)
GLUCOSE BLD STRIP.AUTO-MCNC: 136 MG/DL (ref 65–117)
GLUCOSE BLD STRIP.AUTO-MCNC: 163 MG/DL (ref 65–117)
GLUCOSE BLD STRIP.AUTO-MCNC: 163 MG/DL (ref 65–117)
GLUCOSE BLD STRIP.AUTO-MCNC: 185 MG/DL (ref 65–117)
PROT SERPL-MCNC: 5.5 G/DL (ref 6.4–8.2)
SERVICE CMNT-IMP: ABNORMAL

## 2024-10-21 PROCEDURE — 2580000003 HC RX 258: Performed by: HOSPITALIST

## 2024-10-21 PROCEDURE — 6360000002 HC RX W HCPCS: Performed by: HOSPITALIST

## 2024-10-21 PROCEDURE — 97530 THERAPEUTIC ACTIVITIES: CPT

## 2024-10-21 PROCEDURE — 36415 COLL VENOUS BLD VENIPUNCTURE: CPT

## 2024-10-21 PROCEDURE — 94761 N-INVAS EAR/PLS OXIMETRY MLT: CPT

## 2024-10-21 PROCEDURE — 82962 GLUCOSE BLOOD TEST: CPT

## 2024-10-21 PROCEDURE — 6370000000 HC RX 637 (ALT 250 FOR IP): Performed by: HOSPITALIST

## 2024-10-21 PROCEDURE — 6370000000 HC RX 637 (ALT 250 FOR IP): Performed by: INTERNAL MEDICINE

## 2024-10-21 PROCEDURE — 1100000000 HC RM PRIVATE

## 2024-10-21 PROCEDURE — 80076 HEPATIC FUNCTION PANEL: CPT

## 2024-10-21 RX ORDER — LOPERAMIDE HYDROCHLORIDE 2 MG/1
2 CAPSULE ORAL 4 TIMES DAILY PRN
Status: DISCONTINUED | OUTPATIENT
Start: 2024-10-21 | End: 2024-10-24 | Stop reason: HOSPADM

## 2024-10-21 RX ORDER — LACTOBACILLUS RHAMNOSUS GG 10B CELL
1 CAPSULE ORAL
Status: DISCONTINUED | OUTPATIENT
Start: 2024-10-21 | End: 2024-10-24 | Stop reason: HOSPADM

## 2024-10-21 RX ADMIN — INSULIN HUMAN 55 UNITS: 100 INJECTION, SUSPENSION SUBCUTANEOUS at 09:37

## 2024-10-21 RX ADMIN — MEROPENEM 1000 MG: 1 INJECTION INTRAVENOUS at 09:48

## 2024-10-21 RX ADMIN — LOPERAMIDE HYDROCHLORIDE 2 MG: 2 CAPSULE ORAL at 17:55

## 2024-10-21 RX ADMIN — ACETAMINOPHEN 650 MG: 325 TABLET ORAL at 09:50

## 2024-10-21 RX ADMIN — DICLOFENAC SODIUM 2 G: 10 GEL TOPICAL at 17:50

## 2024-10-21 RX ADMIN — INSULIN LISPRO 15 UNITS: 100 INJECTION, SOLUTION INTRAVENOUS; SUBCUTANEOUS at 17:50

## 2024-10-21 RX ADMIN — Medication 1 CAPSULE: at 09:36

## 2024-10-21 RX ADMIN — PREDNISONE 30 MG: 20 TABLET ORAL at 09:35

## 2024-10-21 RX ADMIN — GABAPENTIN 100 MG: 100 CAPSULE ORAL at 09:36

## 2024-10-21 RX ADMIN — CARVEDILOL 6.25 MG: 6.25 TABLET, FILM COATED ORAL at 17:50

## 2024-10-21 RX ADMIN — DICLOFENAC SODIUM 2 G: 10 GEL TOPICAL at 13:11

## 2024-10-21 RX ADMIN — PANTOPRAZOLE SODIUM 40 MG: 40 TABLET, DELAYED RELEASE ORAL at 06:44

## 2024-10-21 RX ADMIN — LOPERAMIDE HYDROCHLORIDE 2 MG: 2 CAPSULE ORAL at 09:36

## 2024-10-21 RX ADMIN — ESCITALOPRAM OXALATE 10 MG: 10 TABLET ORAL at 09:36

## 2024-10-21 RX ADMIN — GABAPENTIN 100 MG: 100 CAPSULE ORAL at 21:47

## 2024-10-21 RX ADMIN — INSULIN LISPRO 15 UNITS: 100 INJECTION, SOLUTION INTRAVENOUS; SUBCUTANEOUS at 09:37

## 2024-10-21 RX ADMIN — CARVEDILOL 6.25 MG: 6.25 TABLET, FILM COATED ORAL at 09:36

## 2024-10-21 RX ADMIN — SODIUM CHLORIDE, PRESERVATIVE FREE 10 ML: 5 INJECTION INTRAVENOUS at 21:48

## 2024-10-21 RX ADMIN — CLOTRIMAZOLE: 1 CREAM VAGINAL at 09:54

## 2024-10-21 RX ADMIN — INSULIN GLARGINE 30 UNITS: 100 INJECTION, SOLUTION SUBCUTANEOUS at 09:36

## 2024-10-21 RX ADMIN — MONTELUKAST 10 MG: 10 TABLET, FILM COATED ORAL at 21:47

## 2024-10-21 RX ADMIN — MEROPENEM 1000 MG: 1 INJECTION INTRAVENOUS at 17:49

## 2024-10-21 RX ADMIN — ACETAMINOPHEN 650 MG: 325 TABLET ORAL at 17:50

## 2024-10-21 RX ADMIN — PRIMIDONE 50 MG: 50 TABLET ORAL at 09:35

## 2024-10-21 RX ADMIN — DICLOFENAC SODIUM 2 G: 10 GEL TOPICAL at 21:45

## 2024-10-21 RX ADMIN — DICLOFENAC SODIUM 2 G: 10 GEL TOPICAL at 09:50

## 2024-10-21 RX ADMIN — PRIMIDONE 50 MG: 50 TABLET ORAL at 21:47

## 2024-10-21 RX ADMIN — INSULIN LISPRO 15 UNITS: 100 INJECTION, SOLUTION INTRAVENOUS; SUBCUTANEOUS at 13:11

## 2024-10-21 RX ADMIN — MEROPENEM 1000 MG: 1 INJECTION INTRAVENOUS at 01:14

## 2024-10-21 ASSESSMENT — PAIN SCALES - GENERAL
PAINLEVEL_OUTOF10: 2
PAINLEVEL_OUTOF10: 4

## 2024-10-21 ASSESSMENT — PAIN DESCRIPTION - ORIENTATION: ORIENTATION: LEFT;RIGHT

## 2024-10-21 ASSESSMENT — PAIN DESCRIPTION - LOCATION: LOCATION: LEG

## 2024-10-21 NOTE — PROGRESS NOTES
[facility]ORD[facility] RAL[facility]  [address] [place] [address] [phone]      Hospitalist Progress Note      NAME: Siomara Collins   :  1945  MRM:  [id number]    Date of service: 10/21/2024  8:52 AM       Assessment and Plan:   Severe sepsis, POA due to UTI; WBC 14.9, , lactic 3.8 on admission.  Check blood culture. urine culture: GNR.  Continue meropenem due to history of ESBL Klebsiella UTI      2.  Elevated LFTs/generalized abdominal pain.  CT of the abdomen without acute finding.  Continue to hold Tricor and Crestor. Hepatitis panel is negative.  ultrasound of the liver shows: Hepatic steatosis.  Improving LFTs.  Continue to monitor LFTs.  Evaluated by GI.     3.  HTN.  Continue Coreg.  May need another BP med if blood pressure continues to be high     4.  DM.  Continue Lantus and NPH.  Follows with endocrinology     5.  Neuropathy.  Cont neurontin, lidocaine patch, diclofenac gel     6.  PMR-cont prednisone 30mg daily    7.  Vaginal fungal infection/itchiness.  Wet prep: Clue cells and trichomoniasis are negative.  Yeast present.  Given fluconazole 150 mg x 1.  Continue miconazole cream.    8.  Diarrhea.  Likely due to antibiotics.  Start lactobacillus and Imodium         Subjective:     Chief Complaint:: Patient was seen and examined as a follow up for severe.  Chart was reviewed.  C/O diarrhea    ROS:  (bold if positive, if negative)    Tolerating PT  Tolerating Diet     v   Objective:     Last 24hrs VS reviewed since prior progress note. Most recent are:    Vitals:    10/21/24 0752   BP: (!) 163/79   Pulse: 69   Resp: 18   Temp: 98.2 °F (36.8 °C)   SpO2: 96%     SpO2 Readings from Last 6 Encounters:   10/21/24 96%   09/15/24 94%   24 94%   24 96%   24 97%   24 95%          Intake/Output Summary (Last 24 hours) at 10/21/2024 0852  Last data filed at 10/20/2024 1222  Gross per 24 hour   Intake 394.64 ml   Output --   Net 394.64 ml     \"GLUCPOC\"  No results for input(s): \"PH\", \"PCO2\", \"PO2\", \"HCO3\", \"FIO2\" in the last 72 hours.  No results for input(s): \"INR\" in the last 72 hours.  [unfilled]    I have reviewed notes of prior 24hr.    Other pertinent lab:     Total time: -35- minutes. I personally saw and examined the patient during this time period.  Greater than 50% of this time was spent in counseling and coordination of care.    I personally reviewed chart, notes, data and current medications in the medical record.  I have personally examined and treated the patient at bedside during this period.                 Care Plan discussed with: Patient, Nursing Staff, and >50% of time spent in counseling and coordination of care    Discussed:  Care Plan    Prophylaxis:  Lovenox    Disposition:  Home w/Family           ___________________________________________________    Attending Physician: Charles Batres MD

## 2024-10-21 NOTE — PROGRESS NOTES
Hospital follow up with PCP set for Friday October 25th 2024 at 2:30 PM with Doctor Vang at their [place] location.

## 2024-10-21 NOTE — CARE COORDINATION
10/21/2024  4:07 PM  Care Management Progress Note    Reason for Admission:   UTI (urinary tract infection), bacterial [N39.0, A49.9]  Urinary tract infection due to ESBL Klebsiella [N39.0, B96.89]  Leukocytosis, unspecified type [D72.829]  Chest pain, unspecified type [R07.9]         Patient Admission Status: Inpatient  Date Admitted to INP: 32%  []NA - OBS/Outpatient  RUR: Readmission Risk Score: 32    Hospitalization in the last 30 days (Readmission):  No        Transition of care plan:  Awaiting medical clearance and DC order. Therapy following. Gi following.  DAVID Cantu CM spoke with ELHAM Bennett with [facility] [place] [facility] who reported she will come to evaluate pt tomorrow to assess if their facility can provide needed care. If unable to, pt will require SNF before returning to the [facility]. Jolene reported that she will call pt's son today and provide him with update.   Date IM given: 10/19/24  []NA  Outpatient follow-up.  Discharge transport: [facility] transport likely.

## 2024-10-21 NOTE — PLAN OF CARE
Problem: Physical Therapy - Adult  Goal: By Discharge: Performs mobility at highest level of function for planned discharge setting.  See evaluation for individualized goals.  Description: FUNCTIONAL STATUS PRIOR TO ADMISSION: The patient  required moderate assistance for basic and instrumental ADLs. and The patient was functional at the wheelchair level and required moderate assistance x2 for transfers to the chair.    HOME SUPPORT PRIOR TO ADMISSION: The patient lived with ALU staff in [facility].    Physical Therapy Goals  Initiated 10/20/2024  1.  Patient will move from supine to sit and sit to supine in bed with contact guard assist within 7 day(s).    2.  Patient will perform sit to stand with moderate assistance x2 within 7 day(s).  3.  Patient will transfer from bed to chair and chair to bed with moderate assistance x2 using the least restrictive device within 7 day(s).  4.  Patient will ambulate with moderate assistance x2 for 3x2 feet with the least restrictive device within 7 day(s).   Outcome: Progressing   PHYSICAL THERAPY TREATMENT    Patient: Siomara Collins (79 y.o. female)  Date: 10/21/2024  Diagnosis: UTI (urinary tract infection), bacterial [N39.0, A49.9]  Urinary tract infection due to ESBL Klebsiella [N39.0, B96.89]  Leukocytosis, unspecified type [D72.829]  Chest pain, unspecified type [R07.9] Urinary tract infection due to ESBL Klebsiella      Precautions: Fall Risk, Contact Precautions                      ASSESSMENT:  Patient continues to benefit from skilled PT services and is progressing towards goals. Pt eager to mobilize OOB this date. Required increased time and mod A for supine to sit, min/mod A for scooting along EOB. Pt completed transfer bed to drop arm recliner toward right using lateral technique; note pt able to push up through legs to assist with boosting/ scooting to chair. RN in at end of session; recommended nursing use of Stacey menendez transfer back to  bed.         PLAN:  Patient continues to benefit from skilled intervention to address the above impairments.  Continue treatment per established plan of care.        Recommendation for discharge: (in order for the patient to meet his/her long term goals):   Moderate intensity short-term skilled physical therapy up to 5x/week    Other factors to consider for discharge: high risk for falls and concern for safely navigating or managing the home environment    IF patient discharges home will need the following DME: continuing to assess with progress       SUBJECTIVE:   Patient stated, \"Thank you so much for helping me.\"    OBJECTIVE DATA SUMMARY:       Functional Mobility Training:  Bed Mobility:  Bed Mobility Training  Bed Mobility Training: Yes  Interventions: Verbal cues;Tactile cues;Manual cues  Supine to Sit: Moderate assistance;Additional time  Scooting: Minimum assistance;Moderate assistance (scoot along EOB)  Transfers:  Transfer Training  Transfer Training: Yes  Interventions: Verbal cues;Safety awareness training  Bed to Chair: Moderate assistance (bed to drop arm recliner, mod A for lateral transfer with pt able to push through LEs to assist with boosting/ scooting)  Balance:  Balance  Sitting: Impaired  Sitting - Static: Good (unsupported)  Sitting - Dynamic: Fair (occasional)       Pain Rating:  Pt reported back pain with initial sitting  Pain Intervention(s):   rest    Activity Tolerance:   Good    After treatment:   Patient left in no apparent distress sitting up in chair, Call bell within reach, Bed/ chair alarm activated, and RN present and aware with recommendation for nursing use of Stacey Stedy for transfer back to bed      COMMUNICATION/EDUCATION:   The patient's plan of care was discussed with: registered nurse    Patient Education  Education Given To: Patient  Education Provided: Role of Therapy;Plan of Care;Transfer Training;Fall Prevention Strategies  Education Method: Verbal  Barriers to

## 2024-10-21 NOTE — PLAN OF CARE
Problem: Chronic Conditions and Co-morbidities  Goal: Patient's chronic conditions and co-morbidity symptoms are monitored and maintained or improved  10/21/2024 0614 by Lizzie Farrar RN  Outcome: Progressing  10/20/2024 1627 by Harjit Martinez RN  Outcome: Progressing     Problem: Discharge Planning  Goal: Discharge to home or other facility with appropriate resources  10/21/2024 0614 by Lizzie Farrar RN  Outcome: Progressing  10/20/2024 1627 by Harjit Martinez RN  Outcome: Progressing     Problem: Pain  Goal: Verbalizes/displays adequate comfort level or baseline comfort level  10/21/2024 0614 by Lizzie Farrar RN  Outcome: Progressing  10/20/2024 1627 by Harjit Martinez RN  Outcome: Progressing     Problem: Skin/Tissue Integrity  Goal: Absence of new skin breakdown  Description: 1.  Monitor for areas of redness and/or skin breakdown  2.  Assess vascular access sites hourly  3.  Every 4-6 hours minimum:  Change oxygen saturation probe site  4.  Every 4-6 hours:  If on nasal continuous positive airway pressure, respiratory therapy assess nares and determine need for appliance change or resting period.  10/21/2024 0614 by Lizzie Farrar RN  Outcome: Progressing  10/20/2024 1627 by Harjit Martinez RN  Outcome: Progressing     Problem: ABCDS Injury Assessment  Goal: Absence of physical injury  10/21/2024 0614 by Lizzie Farrar RN  Outcome: Progressing  10/20/2024 1627 by Harjit Martinez RN  Outcome: Progressing     Problem: Safety - Adult  Goal: Free from fall injury  10/21/2024 0614 by Lizzie Farrar RN  Outcome: Progressing  10/20/2024 1627 by Harjit Martinez RN  Outcome: Progressing

## 2024-10-21 NOTE — WOUND CARE
Wound Care Note:     New consult for \"intergluteal split\"   Seen in 415/01     79 y.o. y/o female admitted on 10/18/2024   Admitted for  UTI (urinary tract infection), bacterial [N39.0, A49.9]  Urinary tract infection due to ESBL Klebsiella [N39.0, B96.89]  Leukocytosis, unspecified type [D72.829]  Chest pain, unspecified type [R07.9]     Past Medical History:   Diagnosis Date    Abnormal LFTs 08/24/2017    FATTY LIVER    Arthritis     OSTEO    Avascular necrosis of hip 08/24/2017    BILAT HIPS    Breast CA ([facility]) 1989, 2001    BILATERAL; SURGERY, CHEMO    Chronic pain     CKD (chronic kidney disease), stage II 8/24/2017    Coagulation disorder ([facility]) 1984    ITP  (DR DC CAR) - PLATELETS DROPPED TO 5K    Depression     Diabetes ([facility]) 2012    TYPE 2; NIDDM    DJD (degenerative joint disease), multiple sites 8/24/2017    GI bleed 2008    HEMORRHOIDS    Hyperlipemia     Hypertension with renal disease 8/24/2017    IBS (irritable bowel syndrome) 8/24/2017    Ill-defined condition 2015    PNEUMONIA X2 - HOSPITALIZED IN 2015    Interstitial lung disease (HCC) 04/01/2019    Liver disease     FATTY LIVER    Myalgia 8/24/2017    On statin therapy 8/24/2017    Overactive bladder 8/24/2017    Pneumonia 04/2015    HOSPITALIZED 3 WEEKS.    Polymyalgia rheumatica (HCC) 8/24/2017    Prophylactic antibiotic 8/24/2017    Reflex abnormality     acid reflex    Rosacea    Diet: ADULT DIET; Regular; 4 carb choices (60 gm/meal)           Assessment:   Patient is alert, cooperative and reports no pain.   Requires 1 assist to reposition for assessment.    Incontinent, has an external urinary device to suction.    Surface: Upton bed with PADMINI mattress    Bilateral heels intact and without erythema.     Buttocks and sacrum intact without erythema.    1. POA Gluteal cleft  Denuding  Moist, red, blanchable scant serous drainage, painful to touch, no odor with surrounding blanchable erythema  Tx: Cleansed with purple wipes and applied zinc

## 2024-10-21 NOTE — PROGRESS NOTES
Zeina Garcia PA-C                       [phone] office             Monday-Friday 8:00 am-4:30 pm  I am not permitted to use \"perfect serve\" use above for contact, thanks.        Gastroenterology Progress Note    October 21, 2024  Admit Date: 10/18/2024         Narrative Assessment and Plan   79 y.o. female admitted with severe sepsis secondary to UTI, being followed by GI for abnormal liver function labs.  These abnormalities in liver function are thought to be intrahepatic cholestasis due to systemic illness, and have been overall improving with treatment of UTI.  AST down from 273 yesterday to 175 today, ALT down from 217 yesterday to 199 today, total bilirubin down from 3.9 yesterday to 3.3 today.  Alk phos remains elevated at 249.    Impression:  Intrahepatic cholestasis secondary to acute illness  Hepatic steatosis    Plan:  Continue trending LFTs daily, they should continue to improve with stabilization from UTI.  Outpatient follow-up with [facility] for monitoring of liver function and further evaluation of hepatic steatosis, possibly FibroScan.  Inpatient GI to sign off, please reconsult if needed further.    Zeina Garcia PA-C    Subjective:   Chief Complaint: Follow-up liver function    HPI: Patient lying in bed at the time of visit, reports that she had some diarrhea yesterday which has resolved.  Denies any other GI complaints.  Reviewed her liver function labs, which have been gradually improving.  She reports a known prior history of fatty liver.      ROS:  The previous review of systems on initial consultation / H&P is noted and reviewed.  Specific changes noted above in HPI.    Current Medications:     Current Facility-Administered Medications   Medication Dose Route Frequency    loperamide (IMODIUM) capsule 2 mg  2 mg Oral 4x Daily PRN    lactobacillus (CULTURELLE) capsule 1 capsule  1 capsule

## 2024-10-22 LAB
ALBUMIN SERPL-MCNC: 2.7 G/DL (ref 3.5–5)
ALBUMIN/GLOB SERPL: 1 (ref 1.1–2.2)
ALP SERPL-CCNC: 291 U/L (ref 45–117)
ALT SERPL-CCNC: 158 U/L (ref 12–78)
AST SERPL-CCNC: 77 U/L (ref 15–37)
BACTERIA SPEC CULT: ABNORMAL
BACTERIA SPEC CULT: ABNORMAL
BILIRUB DIRECT SERPL-MCNC: 0.9 MG/DL (ref 0–0.2)
BILIRUB SERPL-MCNC: 1.2 MG/DL (ref 0.2–1)
CC UR VC: ABNORMAL
GLOBULIN SER CALC-MCNC: 2.8 G/DL (ref 2–4)
GLUCOSE BLD STRIP.AUTO-MCNC: 170 MG/DL (ref 65–117)
GLUCOSE BLD STRIP.AUTO-MCNC: 194 MG/DL (ref 65–117)
GLUCOSE BLD STRIP.AUTO-MCNC: 196 MG/DL (ref 65–117)
GLUCOSE BLD STRIP.AUTO-MCNC: 79 MG/DL (ref 65–117)
PROT SERPL-MCNC: 5.5 G/DL (ref 6.4–8.2)
SERVICE CMNT-IMP: ABNORMAL
SERVICE CMNT-IMP: NORMAL

## 2024-10-22 PROCEDURE — 6360000002 HC RX W HCPCS: Performed by: HOSPITALIST

## 2024-10-22 PROCEDURE — 97110 THERAPEUTIC EXERCISES: CPT

## 2024-10-22 PROCEDURE — 80076 HEPATIC FUNCTION PANEL: CPT

## 2024-10-22 PROCEDURE — 6370000000 HC RX 637 (ALT 250 FOR IP): Performed by: HOSPITALIST

## 2024-10-22 PROCEDURE — 6370000000 HC RX 637 (ALT 250 FOR IP): Performed by: INTERNAL MEDICINE

## 2024-10-22 PROCEDURE — 94761 N-INVAS EAR/PLS OXIMETRY MLT: CPT

## 2024-10-22 PROCEDURE — 1100000000 HC RM PRIVATE

## 2024-10-22 PROCEDURE — 82962 GLUCOSE BLOOD TEST: CPT

## 2024-10-22 PROCEDURE — 97530 THERAPEUTIC ACTIVITIES: CPT

## 2024-10-22 PROCEDURE — 2580000003 HC RX 258: Performed by: HOSPITALIST

## 2024-10-22 RX ORDER — GABAPENTIN 300 MG/1
300 CAPSULE ORAL NIGHTLY
Status: DISCONTINUED | OUTPATIENT
Start: 2024-10-22 | End: 2024-10-24 | Stop reason: HOSPADM

## 2024-10-22 RX ORDER — GABAPENTIN 100 MG/1
100 CAPSULE ORAL 2 TIMES DAILY
Status: DISCONTINUED | OUTPATIENT
Start: 2024-10-22 | End: 2024-10-24 | Stop reason: HOSPADM

## 2024-10-22 RX ADMIN — INSULIN HUMAN 55 UNITS: 100 INJECTION, SUSPENSION SUBCUTANEOUS at 08:20

## 2024-10-22 RX ADMIN — MEROPENEM 1000 MG: 1 INJECTION INTRAVENOUS at 08:26

## 2024-10-22 RX ADMIN — Medication 1 CAPSULE: at 08:20

## 2024-10-22 RX ADMIN — GABAPENTIN 100 MG: 100 CAPSULE ORAL at 13:45

## 2024-10-22 RX ADMIN — PRIMIDONE 50 MG: 50 TABLET ORAL at 22:03

## 2024-10-22 RX ADMIN — DICLOFENAC SODIUM 2 G: 10 GEL TOPICAL at 22:02

## 2024-10-22 RX ADMIN — MEROPENEM 1000 MG: 1 INJECTION INTRAVENOUS at 01:53

## 2024-10-22 RX ADMIN — INSULIN LISPRO 15 UNITS: 100 INJECTION, SOLUTION INTRAVENOUS; SUBCUTANEOUS at 12:12

## 2024-10-22 RX ADMIN — MONTELUKAST 10 MG: 10 TABLET, FILM COATED ORAL at 22:03

## 2024-10-22 RX ADMIN — INSULIN LISPRO 15 UNITS: 100 INJECTION, SOLUTION INTRAVENOUS; SUBCUTANEOUS at 17:01

## 2024-10-22 RX ADMIN — PREDNISONE 30 MG: 20 TABLET ORAL at 08:20

## 2024-10-22 RX ADMIN — GABAPENTIN 300 MG: 300 CAPSULE ORAL at 22:03

## 2024-10-22 RX ADMIN — PANTOPRAZOLE SODIUM 40 MG: 40 TABLET, DELAYED RELEASE ORAL at 06:19

## 2024-10-22 RX ADMIN — INSULIN GLARGINE 30 UNITS: 100 INJECTION, SOLUTION SUBCUTANEOUS at 08:20

## 2024-10-22 RX ADMIN — ESCITALOPRAM OXALATE 10 MG: 10 TABLET ORAL at 08:21

## 2024-10-22 RX ADMIN — DICLOFENAC SODIUM 2 G: 10 GEL TOPICAL at 12:12

## 2024-10-22 RX ADMIN — DICLOFENAC SODIUM 2 G: 10 GEL TOPICAL at 08:21

## 2024-10-22 RX ADMIN — CARVEDILOL 6.25 MG: 6.25 TABLET, FILM COATED ORAL at 17:01

## 2024-10-22 RX ADMIN — GABAPENTIN 100 MG: 100 CAPSULE ORAL at 08:21

## 2024-10-22 RX ADMIN — DICLOFENAC SODIUM 2 G: 10 GEL TOPICAL at 17:02

## 2024-10-22 RX ADMIN — PRIMIDONE 50 MG: 50 TABLET ORAL at 08:20

## 2024-10-22 RX ADMIN — CLOTRIMAZOLE: 1 CREAM VAGINAL at 08:21

## 2024-10-22 RX ADMIN — MEROPENEM 1000 MG: 1 INJECTION INTRAVENOUS at 17:04

## 2024-10-22 RX ADMIN — CARVEDILOL 6.25 MG: 6.25 TABLET, FILM COATED ORAL at 08:21

## 2024-10-22 RX ADMIN — SODIUM CHLORIDE, PRESERVATIVE FREE 10 ML: 5 INJECTION INTRAVENOUS at 22:15

## 2024-10-22 RX ADMIN — SODIUM CHLORIDE, PRESERVATIVE FREE 10 ML: 5 INJECTION INTRAVENOUS at 08:22

## 2024-10-22 RX ADMIN — ACETAMINOPHEN 650 MG: 325 TABLET ORAL at 08:33

## 2024-10-22 ASSESSMENT — PAIN DESCRIPTION - LOCATION: LOCATION: BACK

## 2024-10-22 ASSESSMENT — PAIN DESCRIPTION - DESCRIPTORS: DESCRIPTORS: DISCOMFORT

## 2024-10-22 ASSESSMENT — PAIN SCALES - GENERAL: PAINLEVEL_OUTOF10: 5

## 2024-10-22 ASSESSMENT — PAIN DESCRIPTION - ORIENTATION: ORIENTATION: LOWER

## 2024-10-22 NOTE — PLAN OF CARE
Problem: Occupational Therapy - Adult  Goal: By Discharge: Performs self-care activities at highest level of function for planned discharge setting.  See evaluation for individualized goals.  Description: FUNCTIONAL STATUS PRIOR TO ADMISSION:  9/4/24-9/15/24 hospitalized at [facility] and discharge to SNF at [facility].  Pt then discharge to [facility] on 10/2/24.  Pt requires assist with ADLs and 2 person assist for stand pivot transfers to w/c.  Pt is non-ambulatory.  Receives Help From: Personal care attendant, ADL Assistance: Needs assistance, Bath: Moderate assistance, Dressing: Moderate assistance, Grooming: Supervision, Feeding: Modified independent , Toileting: Needs assistance,  , Ambulation Assistance: Non-ambulatory, Transfer Assistance: Needs assistance (max A x2 per pt), Active : No     HOME SUPPORT: Patient lives in [facility]  .    Occupational Therapy Goals:  Initiated 10/20/2024  1.  Patient will perform upper body dressing seated EOB with Supervision within 7 day(s).  2.  Patient will perform lower body dressing bed level with Moderate Assist within 7 day(s).  3.  Patient will perform toilet transfers with Moderate Assist and Assist x2  within 7 day(s).  4.  Patient will perform all aspects of toileting with Moderate Assist within 7 day(s).  5.  Patient will participate in upper extremity therapeutic exercise/activities with Modified [place] for 10 minutes within 7 day(s).    6.  Patient will utilize energy conservation techniques during functional activities with verbal and visual cues within 7 day(s).    Outcome: Progressing   OCCUPATIONAL THERAPY TREATMENT  Patient: Siomara Collins (79 y.o. female)  Date: 10/22/2024  Primary Diagnosis: UTI (urinary tract infection), bacterial [N39.0, A49.9]  Urinary tract infection due to ESBL Klebsiella [N39.0, B96.89]  Leukocytosis, unspecified type [D72.829]  Chest pain, unspecified type [R07.9]       Precautions: Fall Risk, Contact  Precautions                Chart, occupational therapy assessment, plan of care, and goals were reviewed.    ASSESSMENT  Patient continues to benefit from skilled OT services and is slowly progressing towards goals. Pt laterally transfers to chair max assist. Once in the chair she is set-up for grooming and engaged with one set of upper extremities there ex. Pt wants to return to [facility] however SNF may be more appropriate.              PLAN :  Patient continues to benefit from skilled intervention to address the above impairments.  Continue treatment per established plan of care to address goals.    Recommend with staff: Adl's, there ex, there act    Recommend next OT session: cont towards goals    Recommendation for discharge: (in order for the patient to meet his/her long term goals):   Moderate intensity short-term skilled occupational therapy up to 5x/week    Other factors to consider for discharge: patient's current support system is unable to meet their requirements for physical assistance and concern for safely navigating or managing the home environment    IF patient discharges home will need the following DME:        SUBJECTIVE:   Patient stated “they only got me up an hour a day a nursing facility .”    OBJECTIVE DATA SUMMARY:   Cognitive/Behavioral Status:  Orientation  Overall Orientation Status: Within Functional Limits  Orientation Level: Oriented X4  Cognition  Overall Cognitive Status: WFL    Functional Mobility and Transfers for ADLs:  Bed Mobility:    Max assist supine to sit    Transfers:    Max assist lateral transfer bed to chair           Balance:   Impaired standing balance         ADL Intervention:       Grooming: Setup   Grooming Skilled Clinical Factors: seated in chair        Skin Care: Bath wipes;Protective barrier  Upper extremities there ex    EXERCISE    Sets    Reps    Active  Active Assist    Passive  Self ROM    Comments    Scapular Elevation/Depression 1 10 [x]   []   []   []

## 2024-10-22 NOTE — PLAN OF CARE
Problem: Physical Therapy - Adult  Goal: By Discharge: Performs mobility at highest level of function for planned discharge setting.  See evaluation for individualized goals.  Description: FUNCTIONAL STATUS PRIOR TO ADMISSION: The patient  required moderate assistance for basic and instrumental ADLs. and The patient was functional at the wheelchair level and required moderate assistance x2 for transfers to the chair.    HOME SUPPORT PRIOR TO ADMISSION: The patient lived with [facility] staff in [facility].    Physical Therapy Goals  Initiated 10/20/2024  1.  Patient will move from supine to sit and sit to supine in bed with contact guard assist within 7 day(s).    2.  Patient will perform sit to stand with moderate assistance x2 within 7 day(s).  3.  Patient will transfer from bed to chair and chair to bed with moderate assistance x2 using the least restrictive device within 7 day(s).  4.  Patient will ambulate with moderate assistance x2 for 3x2 feet with the least restrictive device within 7 day(s).   Outcome: Progressing   PHYSICAL THERAPY TREATMENT    Patient: Siomara Collins (79 y.o. female)  Date: 10/22/2024  Diagnosis: UTI (urinary tract infection), bacterial [N39.0, A49.9]  Urinary tract infection due to ESBL Klebsiella [N39.0, B96.89]  Leukocytosis, unspecified type [D72.829]  Chest pain, unspecified type [R07.9] Urinary tract infection due to ESBL Klebsiella      Precautions: Fall Risk, Contact Precautions                      ASSESSMENT:  Patient continues to benefit from skilled PT services.Pt seen this AM.Pt supine to sit with mod assist.Pt was set up for lateral transfers bed to drop arm chair.Pt was able to scoot with min assist to edge of chair.Pt was assisted with squat pivot to complete transfer mod assist of 2.Pt left sitting.Pt to be transferred back to bed with staff using reinaldo khanna.Pt progressing slowly.Continue goals.         PLAN:  Patient continues to benefit from skilled

## 2024-10-22 NOTE — PROGRESS NOTES
Spiritual Health History and Assessment/Progress Note  [facility]    Rituals, Rites and Sacraments,  , Adjustment to illness, Life Adjustments,      Name: Siomara Collins MRN: [id number]    Age: 79 y.o.     Sex: female   Language: English   Yarsani: Yarsani   Urinary tract infection due to ESBL Klebsiella     Date: 10/22/2024            Total Time Calculated: 5 min              Spiritual Assessment continued in [facility] B4 MULTI-SPECIALTY ORTHOPEDICS 1        Referral/Consult From: Clergy/   Encounter Overview/Reason: Rituals, Rites and Sacraments  Service Provided For: Patient    Amy, Belief, Meaning:   Patient is connected with a amy tradition or spiritual practice  Family/Friends No family/friends present      Importance and Influence:  Patient has spiritual/personal beliefs that influence decisions regarding their health  Family/Friends No family/friends present    Community:  Patient Other: iunknown  Family/Friends No family/friends present    Assessment and Plan of Care:     Patient Interventions include: Provided sacramental/Church ritual  Family/Friends Interventions include: No family/friends present    Patient Plan of Care: Spiritual Care available upon further referral  Family/Friends Plan of Care: Spiritual Care available upon further referral    Electronically signed by Chaplain BASILIO on 10/22/2024 at 2:46 PM     [place] visit attempted.  Mrs. Collins is Yarsani. She was being attended to by staff and not available for a visit. Prayer for spiritual communion offered for her well-being outside her room.     Sr. CAMRYN Singh, RN, ACSW, LCSW   Page:  [phone]

## 2024-10-22 NOTE — PROGRESS NOTES
JORDYN RALPH [facility]  [address] [place] [address]  [phone]      Hospitalist  Progress Note      NAME:       Siomara Collins   :        1945  MRM:        [id number]    Date of service: 10/22/2024      Subjective: Patient seen and examined by me. Patient admitted with sepsis due to a UTI. She has improved although she remains weak. Afebrile. No nausea or vomiting.     Objective:    Vital Signs:    BP (!) 151/78   Pulse 62   Temp 98.2 °F (36.8 °C) (Oral)   Resp 16   Ht 1.6 m (5' 3\")   Wt 83 kg (182 lb 15.7 oz)   SpO2 97%   BMI 32.41 kg/m²      No intake or output data in the 24 hours ending 10/22/24 1222     Current inpatient medications reviewed:  Current Facility-Administered Medications   Medication Dose Route Frequency    gabapentin (NEURONTIN) capsule 100 mg  100 mg Oral BID    gabapentin (NEURONTIN) capsule 300 mg  300 mg Oral Nightly    loperamide (IMODIUM) capsule 2 mg  2 mg Oral 4x Daily PRN    lactobacillus (CULTURELLE) capsule 1 capsule  1 capsule Oral Daily with breakfast    clotrimazole (LOTRIMIN) 1 % vaginal cream   Vaginal Daily    acetaminophen (TYLENOL) tablet 650 mg  650 mg Oral Q6H PRN    sodium chloride flush 0.9 % injection 5-40 mL  5-40 mL IntraVENous 2 times per day    sodium chloride flush 0.9 % injection 5-40 mL  5-40 mL IntraVENous PRN    0.9 % sodium chloride infusion   IntraVENous PRN    potassium chloride (KLOR-CON) extended release tablet 40 mEq  40 mEq Oral PRN    Or    potassium bicarb-citric acid (EFFER-K) effervescent tablet 40 mEq  40 mEq Oral PRN    Or    potassium chloride 10 mEq/100 mL IVPB (Peripheral Line)  10 mEq IntraVENous PRN    magnesium sulfate 2000 mg in 50 mL IVPB premix  2,000 mg IntraVENous PRN    enoxaparin (LOVENOX) injection 40 mg  40 mg SubCUTAneous Daily    ondansetron (ZOFRAN-ODT) disintegrating tablet 4 mg  4 mg Oral Q8H PRN    Or    ondansetron  deformities.  Skin:  No rashes, bruising or ulcers.   Neuro: Awake and alert. Generally a non focal exam. Follows commands appropriately  Psych:  Has a fair insight and is oriented x 3    Diagnostic testing:    Laboratory data reviewed and independently interpreted:    Recent Labs     10/20/24  0437   WBC 7.0   HGB 12.0   HCT 36.1   RBC 3.53*   .3*   MCH 34.0        No results found for: \"LACTA\"  Recent Labs     10/20/24  0437 10/21/24  0356 10/22/24  0721     --   --    K 3.4*  --   --    *  --   --    CO2 27  --   --    GLUCOSE 171*  --   --    BUN 13  --   --    CREATININE 0.51*  --   --    CALCIUM 7.7*  --   --    BILITOT 3.9* 2.3* 1.2*   ALKPHOS 192* 249* 291*   * 175* 77*   * 199* 158*     No components found for: \"GLUCOSEPOC\"  Lab Results   Component Value Date/Time    CHOL 318 05/13/2024 02:43 PM    TRIG 240 05/13/2024 02:43 PM    HDL 56 05/13/2024 02:43 PM     Imaging data reviewed:    US ABDOMEN LIMITED Specify organ? LIVER    Result Date: 10/20/2024  Hepatic steatosis. Contracted gallbladder. Otherwise unremarkable. Electronically signed by NAYELI GONSALEZ    CT ABDOMEN PELVIS W IV CONTRAST Additional Contrast? Oral    Result Date: 10/19/2024  No acute process. Electronically signed by Michael Mccall    XR CHEST (2 VW)    Result Date: 10/18/2024  Grossly clear lungs Electronically signed by Michael Floyd MD    Cultures, Imaging studies reviewed and reports noted, Telemetry reviewed and independently interpreted by me and available notes from other care providers - all reviewed by me on: 10/22/2024    Assessment and Plan:    Recurrent Urinary tract infection / History of ESBL Klebsiella pneumoniae UTI / Immunosuppression due to chronic steroid use POA: admitted with severe sepsis that is resolving. Clinically patient is better. CT scan abdomen and pelvis neg for any acute changes. Urine cultures isolating Klebsiella and Proteus and sensitivities are pending. Blood cultures

## 2024-10-22 NOTE — CARE COORDINATION
10/22/2024    2:17 PM  CM met with pt to discuss dispo. Pt tearful during session, and expressed concern in returning to SNF due to lack of daily therapy. Pt expressed she felt she would become stronger in the [place] with OP PT. While in room with pt, pt called ELHAM Jimenez with [facility] [place], and requested she return. Tony explained to pt why rehab was required prior to pt's return. Following phone conversation, pt requested CM submit referrals to [facility] and [facility]oss[facility] Weiss for IPR. CM submitted referrals via [employer], and is awaiting response.     12:27 PM  Care Management Progress Note    Reason for Admission:   UTI (urinary tract infection), bacterial [N39.0, A49.9]  Urinary tract infection due to ESBL Klebsiella [N39.0, B96.89]  Leukocytosis, unspecified type [D72.829]  Chest pain, unspecified type [R07.9]         Patient Admission Status: Inpatient  Date Admitted to INP: 10/19/24 []NA - OBS/Outpatient  RUR: Readmission Risk Score: 32.1    Hospitalization in the last 30 days (Readmission):  No        Transition of care plan:  Awaiting medical clearance and DC order. Therapy treating. GI following.   SNF - CM [facility] that [facility] requiring pt go to SNF prior to discharging to their facility. CM spoke with pt's son, Jimi, re recommendation. SNF listing sent via HIPAA compliant email, and preferences requested.   Date IM given: 10/19/24  []NA  Outpatient follow-up.  Discharge transport: [facility] transport likely.

## 2024-10-22 NOTE — PROGRESS NOTES
Music Therapy Assessment  [facility]    Siomara Collins [id number]     1945  79 y.o.  female    Patient Telephone Number: [phone] (home)   Zoroastrianism Affiliation: Anabaptist   Language: English   Patient Active Problem List    Diagnosis Date Noted    Hepatic steatosis 10/21/2024    HTN (hypertension), benign 10/21/2024    Diabetic polyneuropathy associated with type 2 diabetes mellitus ([facility]) 10/21/2024    Failure to thrive in adult 09/05/2024    Hyperglycemia 09/05/2024    Type 2 diabetes mellitus with hyperglycemia, with long-term current use of insulin ([facility]) 09/05/2024    Immunosuppression due to chronic steroid use ([facility]) 09/05/2024    History of ESBL Klebsiella pneumoniae infection 09/05/2024    Poorly controlled diabetes mellitus ([facility]) 09/05/2024    Hemoglobin A1C greater than 9%, indicating poor diabetic control 07/30/2024    Hx of antibiotic allergy 07/28/2024    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 07/28/2024    CKD (chronic kidney disease) stage 2, GFR 60-89 ml/min 07/28/2024    Dysuria 07/28/2024    ESBL (extended spectrum beta-lactamase) producing bacteria infection 07/26/2024    Hypokalemia 07/15/2024    Chronic UTI (urinary tract infection) 07/02/2024    Urinary tract infection due to ESBL Klebsiella 06/30/2024    Complicated UTI (urinary tract infection) 06/18/2024    Monilial vaginitis 05/13/2024    Hypomagnesemia 05/13/2024    Bilateral pneumonia 04/19/2024    Sepsis ([facility]) 04/05/2024    Recurrent UTI 02/27/2024    UTI due to extended-spectrum beta lactamase (ESBL) producing Escherichia coli 02/22/2024    Hypoglycemia 03/12/2023    Neuropathy 07/26/2022    Chronic hypoxemic respiratory failure 05/15/2022    Uncontrolled type 2 diabetes mellitus with hyperglycemia ([facility]) 12/11/2021    Chronic midline low back pain without sciatica 07/06/2021    Tremor 08/13/2020    Primary osteoarthritis of right shoulder 02/13/2020    Alcohol screening 02/12/2020     relaxation and pain reduction. Pt expressed an openness to this and shared her music preferences. MT honored this by leading her in a guided relaxation that incorporated themes of nature, peace, safety, and love. MT also incorporated the song In the Garden by singing and playing this with guitar at a slow, quiet tempo. While absorbing the musical stimuli, pt increase relaxation as evidenced by (AEB) slowing her breathing as she closed her eyes and adjusted her body to lay flat in bed. As the experience concluded, pt wiped a tear from her eye and expressed enjoyment in the music. She acknowledged how relaxing it was and asked if this MT could offer additional selections. MT confirmed and sang/played the following songs with guitar at a slow, quiet tempo; How Great Thou Art & It Is Well With my Soul. Pt continued to close her eyes and slow her breathing throughout these selections. In between each selection, MT offered active listening as pt expressed her various concerns and hopes for her care. She noted feeling like a burden to others and wished she knew what her purpose in life was. Pt also acknowledged the adventurous life she led in her younger years, as well as grieving the loss of the life she thought she would have post-custodial. She appeared tearful as she noted how difficult it was to move out of her home, given to her by her parents, as well as how hard its been having a limited support system. She identified God to be a supportive resource to her, \"he'll never leave me\", and expressed hope of gaining strength so she can be more independent. MT offered further words of validation in response to her sharing, and acknowledged preparing for any outcome, whether it be gaining strength at a rehab that will accept her, or moving to a new space and navigating a new \"new\". Pt noted this to be helpful and began listing various things she could consider or pursue, in the process of finding her new \"new\". She

## 2024-10-23 LAB
ALBUMIN SERPL-MCNC: 2.7 G/DL (ref 3.5–5)
ALBUMIN/GLOB SERPL: 0.9 (ref 1.1–2.2)
ALP SERPL-CCNC: 321 U/L (ref 45–117)
ALT SERPL-CCNC: 126 U/L (ref 12–78)
AST SERPL-CCNC: 54 U/L (ref 15–37)
BILIRUB DIRECT SERPL-MCNC: 0.6 MG/DL (ref 0–0.2)
BILIRUB SERPL-MCNC: 0.9 MG/DL (ref 0.2–1)
GLOBULIN SER CALC-MCNC: 2.9 G/DL (ref 2–4)
GLUCOSE BLD STRIP.AUTO-MCNC: 126 MG/DL (ref 65–117)
GLUCOSE BLD STRIP.AUTO-MCNC: 135 MG/DL (ref 65–117)
GLUCOSE BLD STRIP.AUTO-MCNC: 141 MG/DL (ref 65–117)
GLUCOSE BLD STRIP.AUTO-MCNC: 89 MG/DL (ref 65–117)
PROT SERPL-MCNC: 5.6 G/DL (ref 6.4–8.2)
SERVICE CMNT-IMP: ABNORMAL
SERVICE CMNT-IMP: NORMAL

## 2024-10-23 PROCEDURE — 82962 GLUCOSE BLOOD TEST: CPT

## 2024-10-23 PROCEDURE — 2580000003 HC RX 258: Performed by: HOSPITALIST

## 2024-10-23 PROCEDURE — 97530 THERAPEUTIC ACTIVITIES: CPT

## 2024-10-23 PROCEDURE — 97530 THERAPEUTIC ACTIVITIES: CPT | Performed by: OCCUPATIONAL THERAPIST

## 2024-10-23 PROCEDURE — 6360000002 HC RX W HCPCS: Performed by: HOSPITALIST

## 2024-10-23 PROCEDURE — 6370000000 HC RX 637 (ALT 250 FOR IP): Performed by: INTERNAL MEDICINE

## 2024-10-23 PROCEDURE — 80076 HEPATIC FUNCTION PANEL: CPT

## 2024-10-23 PROCEDURE — 1100000000 HC RM PRIVATE

## 2024-10-23 PROCEDURE — 97110 THERAPEUTIC EXERCISES: CPT | Performed by: OCCUPATIONAL THERAPIST

## 2024-10-23 PROCEDURE — 36415 COLL VENOUS BLD VENIPUNCTURE: CPT

## 2024-10-23 PROCEDURE — 94761 N-INVAS EAR/PLS OXIMETRY MLT: CPT

## 2024-10-23 PROCEDURE — 6370000000 HC RX 637 (ALT 250 FOR IP): Performed by: HOSPITALIST

## 2024-10-23 PROCEDURE — 97535 SELF CARE MNGMENT TRAINING: CPT | Performed by: OCCUPATIONAL THERAPIST

## 2024-10-23 RX ORDER — LACTOBACILLUS RHAMNOSUS GG 10B CELL
1 CAPSULE ORAL
Qty: 15 CAPSULE | Refills: 0 | Status: SHIPPED
Start: 2024-10-24 | End: 2024-11-08

## 2024-10-23 RX ORDER — GABAPENTIN 100 MG/1
100 CAPSULE ORAL 2 TIMES DAILY
Qty: 60 CAPSULE | Refills: 0 | Status: SHIPPED | OUTPATIENT
Start: 2024-10-23 | End: 2024-11-22

## 2024-10-23 RX ADMIN — DICLOFENAC SODIUM 2 G: 10 GEL TOPICAL at 12:12

## 2024-10-23 RX ADMIN — ESCITALOPRAM OXALATE 10 MG: 10 TABLET ORAL at 09:14

## 2024-10-23 RX ADMIN — DICLOFENAC SODIUM 2 G: 10 GEL TOPICAL at 21:45

## 2024-10-23 RX ADMIN — PANTOPRAZOLE SODIUM 40 MG: 40 TABLET, DELAYED RELEASE ORAL at 06:43

## 2024-10-23 RX ADMIN — CARVEDILOL 6.25 MG: 6.25 TABLET, FILM COATED ORAL at 09:15

## 2024-10-23 RX ADMIN — MEROPENEM 1000 MG: 1 INJECTION INTRAVENOUS at 01:11

## 2024-10-23 RX ADMIN — INSULIN HUMAN 55 UNITS: 100 INJECTION, SUSPENSION SUBCUTANEOUS at 09:16

## 2024-10-23 RX ADMIN — PREDNISONE 30 MG: 20 TABLET ORAL at 09:15

## 2024-10-23 RX ADMIN — SODIUM CHLORIDE, PRESERVATIVE FREE 10 ML: 5 INJECTION INTRAVENOUS at 21:46

## 2024-10-23 RX ADMIN — PRIMIDONE 50 MG: 50 TABLET ORAL at 09:15

## 2024-10-23 RX ADMIN — ACETAMINOPHEN 650 MG: 325 TABLET ORAL at 14:28

## 2024-10-23 RX ADMIN — INSULIN LISPRO 15 UNITS: 100 INJECTION, SOLUTION INTRAVENOUS; SUBCUTANEOUS at 12:12

## 2024-10-23 RX ADMIN — GABAPENTIN 300 MG: 300 CAPSULE ORAL at 21:45

## 2024-10-23 RX ADMIN — DICLOFENAC SODIUM 2 G: 10 GEL TOPICAL at 09:16

## 2024-10-23 RX ADMIN — CLOTRIMAZOLE: 1 CREAM VAGINAL at 09:17

## 2024-10-23 RX ADMIN — GABAPENTIN 100 MG: 100 CAPSULE ORAL at 14:25

## 2024-10-23 RX ADMIN — INSULIN LISPRO 15 UNITS: 100 INJECTION, SOLUTION INTRAVENOUS; SUBCUTANEOUS at 17:07

## 2024-10-23 RX ADMIN — Medication 1 CAPSULE: at 09:14

## 2024-10-23 RX ADMIN — CARVEDILOL 6.25 MG: 6.25 TABLET, FILM COATED ORAL at 17:07

## 2024-10-23 RX ADMIN — PRIMIDONE 50 MG: 50 TABLET ORAL at 21:45

## 2024-10-23 RX ADMIN — GABAPENTIN 100 MG: 100 CAPSULE ORAL at 09:15

## 2024-10-23 RX ADMIN — MEROPENEM 1000 MG: 1 INJECTION INTRAVENOUS at 17:07

## 2024-10-23 RX ADMIN — INSULIN GLARGINE 30 UNITS: 100 INJECTION, SOLUTION SUBCUTANEOUS at 09:15

## 2024-10-23 RX ADMIN — MEROPENEM 1000 MG: 1 INJECTION INTRAVENOUS at 09:34

## 2024-10-23 RX ADMIN — DICLOFENAC SODIUM 2 G: 10 GEL TOPICAL at 17:07

## 2024-10-23 RX ADMIN — SODIUM CHLORIDE, PRESERVATIVE FREE 10 ML: 5 INJECTION INTRAVENOUS at 09:34

## 2024-10-23 RX ADMIN — MONTELUKAST 10 MG: 10 TABLET, FILM COATED ORAL at 21:45

## 2024-10-23 ASSESSMENT — PAIN DESCRIPTION - LOCATION
LOCATION: GENERALIZED
LOCATION: GENERALIZED

## 2024-10-23 ASSESSMENT — PAIN DESCRIPTION - DESCRIPTORS
DESCRIPTORS: DISCOMFORT
DESCRIPTORS: DISCOMFORT

## 2024-10-23 ASSESSMENT — PAIN SCALES - GENERAL
PAINLEVEL_OUTOF10: 5
PAINLEVEL_OUTOF10: 6

## 2024-10-23 NOTE — PROGRESS NOTES
JORDYN RALPH [facility]  [address] [place] [address]  [phone]      Hospitalist  Progress Note      NAME:       Siomara Collins   :        1945  MRM:        [id number]    Date of service: 10/23/2024      Subjective: Patient seen and examined by me. Patient admitted with sepsis due to a UTI. She has improved and feels well but weak. No nausea or vomiting.      Objective:    Vital Signs:    BP (!) 152/76   Pulse 64   Temp 97.5 °F (36.4 °C) (Oral)   Resp 18   Ht 1.6 m (5' 3\")   Wt 83 kg (182 lb 15.7 oz)   SpO2 97%   BMI 32.41 kg/m²      No intake or output data in the 24 hours ending 10/23/24 1146     Current inpatient medications reviewed:  Current Facility-Administered Medications   Medication Dose Route Frequency    gabapentin (NEURONTIN) capsule 100 mg  100 mg Oral BID    gabapentin (NEURONTIN) capsule 300 mg  300 mg Oral Nightly    loperamide (IMODIUM) capsule 2 mg  2 mg Oral 4x Daily PRN    lactobacillus (CULTURELLE) capsule 1 capsule  1 capsule Oral Daily with breakfast    clotrimazole (LOTRIMIN) 1 % vaginal cream   Vaginal Daily    acetaminophen (TYLENOL) tablet 650 mg  650 mg Oral Q6H PRN    sodium chloride flush 0.9 % injection 5-40 mL  5-40 mL IntraVENous 2 times per day    sodium chloride flush 0.9 % injection 5-40 mL  5-40 mL IntraVENous PRN    0.9 % sodium chloride infusion   IntraVENous PRN    potassium chloride (KLOR-CON) extended release tablet 40 mEq  40 mEq Oral PRN    Or    potassium bicarb-citric acid (EFFER-K) effervescent tablet 40 mEq  40 mEq Oral PRN    Or    potassium chloride 10 mEq/100 mL IVPB (Peripheral Line)  10 mEq IntraVENous PRN    magnesium sulfate 2000 mg in 50 mL IVPB premix  2,000 mg IntraVENous PRN    enoxaparin (LOVENOX) injection 40 mg  40 mg SubCUTAneous Daily    ondansetron (ZOFRAN-ODT) disintegrating tablet 4 mg  4 mg Oral Q8H PRN    Or    ondansetron (ZOFRAN)

## 2024-10-23 NOTE — CARE COORDINATION
10/23/2024  4:08 PM  Care Management Progress Note    Reason for Admission:   UTI (urinary tract infection), bacterial [N39.0, A49.9]  Urinary tract infection due to ESBL Klebsiella [N39.0, B96.89]  Leukocytosis, unspecified type [D72.829]  Chest pain, unspecified type [R07.9]         Patient Admission Status: Inpatient  Date Admitted to INP: 32% []NA - OBS/Outpatient  RUR: Readmission Risk Score: 31.9    Hospitalization in the last 30 days (Readmission):  No        Transition of care plan:  Discharge pending dispo. Therapy treating.   IPR - CM received acceptance from [facility], and they are anticipating to have a bed tomorrow. [facility] denied. Pt notified and agreeable. Pt's son notified via p/c.   Date IM given: 10/23/24 []NA  Outpatient follow-up.  Discharge transport: [facility]

## 2024-10-23 NOTE — PLAN OF CARE
Problem: Occupational Therapy - Adult  Goal: By Discharge: Performs self-care activities at highest level of function for planned discharge setting.  See evaluation for individualized goals.  Description: FUNCTIONAL STATUS PRIOR TO ADMISSION:  9/4/24-9/15/24 hospitalized at [facility] and discharge to SNF at [facility].  Pt then discharge to [facility] on 10/2/24.  Pt requires assist with ADLs and 2 person assist for stand pivot transfers to w/c.  Pt is non-ambulatory.  Receives Help From: Personal care attendant, ADL Assistance: Needs assistance, Bath: Moderate assistance, Dressing: Moderate assistance, Grooming: Supervision, Feeding: Modified independent , Toileting: Needs assistance,  , Ambulation Assistance: Non-ambulatory, Transfer Assistance: Needs assistance (max A x2 per pt), Active : No     HOME SUPPORT: [facility]  .    Occupational Therapy Goals:  Initiated 10/20/2024  1.  Patient will perform upper body dressing seated EOB with Supervision within 7 day(s).  2.  Patient will perform lower body dressing bed level with Moderate Assist within 7 day(s).  3.  Patient will perform toilet transfers with Moderate Assist and Assist x2  within 7 day(s).  4.  Patient will perform all aspects of toileting with Moderate Assist within 7 day(s).  5.  Patient will participate in upper extremity therapeutic exercise/activities with Modified [place] for 10 minutes within 7 day(s).    6.  Patient will utilize energy conservation techniques during functional activities with verbal and visual cues within 7 day(s).    Outcome: Progressing    OCCUPATIONAL THERAPY TREATMENT  Patient: Siomara Collins (79 y.o. female)  Date: 10/23/2024  Primary Diagnosis: UTI (urinary tract infection), bacterial [N39.0, A49.9]  Urinary tract infection due to ESBL Klebsiella [N39.0, B96.89]  Leukocytosis, unspecified type [D72.829]  Chest pain, unspecified type [R07.9]       Precautions: Fall Risk, Contact  hands on Reinaldo Stedy bar)  Stand Pivot Transfers: Total assistance (reinaldo stedy)           Balance:     Balance  Sitting - Static: Good (unsupported);Fair (occasional)  Sitting - Dynamic: Not tested  Standing: Impaired;With support  Standing - Static: Constant support;Fair  Standing - Dynamic: Not tested      ADL Intervention:         Grooming: Setup   Grooming Skilled Clinical Factors: seated in chair                  Therapeutic Exercise:  Educated on benefit of bed level and chair exercises and need to incorporate throughout the day    In supine instructed in positioning of LE's in bridge and holding, initially required to support at midline, instructed to perform unilaterally,  bilaterally and with hip elevation as able.     Seated in chair instructed in chair push up- initially noted decreased eccentric control in Ue's and falling into chair, with instruction and cues able to demonstrate increased control and sit without \"falling\"   Educated on grading exercise to perform increased reps with shorter hold and less reps with longer hold  Completed 10 reps with ~2 second hold  and brief rest after followed by 1 chair push up with 1 minute hold, noted muscle trembling and cues for breathing     Educated on benefit for strengthening    Instructed to increase time sitting unsupported in chair to increase demand on core muscles, able to maintain sitting balance with hands on lap    Next, instructed to perform UE AROM exercises alternating left and right initially and then progressed to bilateral. Noted decreased shoulder flexion and instructed in shoulder flexion to 90 degrees, chest press, UE arm bike.     Next, seated unsupported instructed to perform unilateral LE knee flexion/extension, hip flexion with cues for eccentric control, unable to perform bilaterally due to weakness and decreased control. Patient initially attempted bilaterally however receptive to instruction on benefit of unilateral performance and

## 2024-10-23 NOTE — PLAN OF CARE
Problem: Physical Therapy - Adult  Goal: By Discharge: Performs mobility at highest level of function for planned discharge setting.  See evaluation for individualized goals.  Description: FUNCTIONAL STATUS PRIOR TO ADMISSION: The patient  required moderate assistance for basic and instrumental ADLs. and The patient was functional at the wheelchair level and required moderate assistance x2 for transfers to the chair.    HOME SUPPORT PRIOR TO ADMISSION: The patient lived with ALU staff in [facility].    Physical Therapy Goals  Initiated 10/20/2024  1.  Patient will move from supine to sit and sit to supine in bed with contact guard assist within 7 day(s).    2.  Patient will perform sit to stand with moderate assistance x2 within 7 day(s).  3.  Patient will transfer from bed to chair and chair to bed with moderate assistance x2 using the least restrictive device within 7 day(s).  4.  Patient will ambulate with moderate assistance x2 for 3x2 feet with the least restrictive device within 7 day(s).   Outcome: Progressing   PHYSICAL THERAPY TREATMENT    Patient: Siomara Collins (79 y.o. female)  Date: 10/23/2024  Diagnosis: UTI (urinary tract infection), bacterial [N39.0, A49.9]  Urinary tract infection due to ESBL Klebsiella [N39.0, B96.89]  Leukocytosis, unspecified type [D72.829]  Chest pain, unspecified type [R07.9] Urinary tract infection due to ESBL Klebsiella      Precautions: Fall Risk, Contact Precautions                      ASSESSMENT:  Patient continues to benefit from skilled PT services.Pt is limited by LE weakness.Pt supine to sit with mod assist using bed rail.Pt scooted to EOB with min assist.Pt pulled to stand into reinaldo steady with mod assist of 2.Pt was able to maintain  reinaldo steady for 20 seconds and then 40 seconds WITH .Pt was transferred to chair with reinaldo steady.Pt was left sitting.Pt progressing slowly.Continue goals.       PLAN:  Patient continues to benefit from skilled

## 2024-10-23 NOTE — PLAN OF CARE
Problem: Chronic Conditions and Co-morbidities  Goal: Patient's chronic conditions and co-morbidity symptoms are monitored and maintained or improved  10/23/2024 0927 by Vaishali Man RN  Outcome: Progressing  10/23/2024 0626 by Yoana Cheatham LPN  Outcome: Progressing     Problem: Discharge Planning  Goal: Discharge to home or other facility with appropriate resources  10/23/2024 0927 by Vaishali Man RN  Outcome: Progressing  10/23/2024 0626 by Yoana Cheatham LPN  Outcome: Progressing     Problem: Pain  Goal: Verbalizes/displays adequate comfort level or baseline comfort level  10/23/2024 0927 by Vaishali Man RN  Outcome: Progressing  10/23/2024 0626 by Yoana Cheatham LPN  Outcome: Progressing     Problem: Skin/Tissue Integrity  Goal: Absence of new skin breakdown  Description: 1.  Monitor for areas of redness and/or skin breakdown  2.  Assess vascular access sites hourly  3.  Every 4-6 hours minimum:  Change oxygen saturation probe site  4.  Every 4-6 hours:  If on nasal continuous positive airway pressure, respiratory therapy assess nares and determine need for appliance change or resting period.  10/23/2024 0927 by Vaishali Man RN  Outcome: Progressing  10/23/2024 0626 by Yoana Cheatham LPN  Outcome: Progressing     Problem: ABCDS Injury Assessment  Goal: Absence of physical injury  10/23/2024 0927 by Vaishali Man RN  Outcome: Progressing  10/23/2024 0626 by Yoana Cheatham LPN  Outcome: Progressing     Problem: Safety - Adult  Goal: Free from fall injury  10/23/2024 0927 by Vaishali Man RN  Outcome: Progressing  10/23/2024 0626 by Yoana Cheatham LPN  Outcome: Progressing

## 2024-10-24 VITALS
RESPIRATION RATE: 16 BRPM | TEMPERATURE: 97.5 F | HEIGHT: 63 IN | BODY MASS INDEX: 32.42 KG/M2 | DIASTOLIC BLOOD PRESSURE: 82 MMHG | HEART RATE: 61 BPM | OXYGEN SATURATION: 97 % | SYSTOLIC BLOOD PRESSURE: 178 MMHG | WEIGHT: 182.98 LBS

## 2024-10-24 PROBLEM — D72.829 LEUKOCYTOSIS: Status: ACTIVE | Noted: 2024-10-24

## 2024-10-24 LAB
GLUCOSE BLD STRIP.AUTO-MCNC: 66 MG/DL (ref 65–117)
GLUCOSE BLD STRIP.AUTO-MCNC: 68 MG/DL (ref 65–117)
SERVICE CMNT-IMP: NORMAL
SERVICE CMNT-IMP: NORMAL

## 2024-10-24 PROCEDURE — 82962 GLUCOSE BLOOD TEST: CPT

## 2024-10-24 PROCEDURE — 2580000003 HC RX 258: Performed by: HOSPITALIST

## 2024-10-24 PROCEDURE — 6370000000 HC RX 637 (ALT 250 FOR IP): Performed by: INTERNAL MEDICINE

## 2024-10-24 PROCEDURE — 6360000002 HC RX W HCPCS: Performed by: HOSPITALIST

## 2024-10-24 PROCEDURE — 99223 1ST HOSP IP/OBS HIGH 75: CPT | Performed by: INTERNAL MEDICINE

## 2024-10-24 PROCEDURE — 97530 THERAPEUTIC ACTIVITIES: CPT

## 2024-10-24 PROCEDURE — 94761 N-INVAS EAR/PLS OXIMETRY MLT: CPT

## 2024-10-24 PROCEDURE — 6370000000 HC RX 637 (ALT 250 FOR IP): Performed by: HOSPITALIST

## 2024-10-24 PROCEDURE — 97535 SELF CARE MNGMENT TRAINING: CPT

## 2024-10-24 RX ADMIN — ESCITALOPRAM OXALATE 10 MG: 10 TABLET ORAL at 09:03

## 2024-10-24 RX ADMIN — PANTOPRAZOLE SODIUM 40 MG: 40 TABLET, DELAYED RELEASE ORAL at 07:37

## 2024-10-24 RX ADMIN — PREDNISONE 30 MG: 20 TABLET ORAL at 09:00

## 2024-10-24 RX ADMIN — DICLOFENAC SODIUM 2 G: 10 GEL TOPICAL at 09:06

## 2024-10-24 RX ADMIN — CLOTRIMAZOLE: 1 CREAM VAGINAL at 09:05

## 2024-10-24 RX ADMIN — INSULIN HUMAN 55 UNITS: 100 INJECTION, SUSPENSION SUBCUTANEOUS at 09:01

## 2024-10-24 RX ADMIN — MEROPENEM 1000 MG: 1 INJECTION INTRAVENOUS at 01:02

## 2024-10-24 RX ADMIN — PRIMIDONE 50 MG: 50 TABLET ORAL at 09:01

## 2024-10-24 RX ADMIN — INSULIN GLARGINE 30 UNITS: 100 INJECTION, SOLUTION SUBCUTANEOUS at 09:01

## 2024-10-24 RX ADMIN — DICLOFENAC SODIUM 2 G: 10 GEL TOPICAL at 13:33

## 2024-10-24 RX ADMIN — GABAPENTIN 100 MG: 100 CAPSULE ORAL at 14:57

## 2024-10-24 RX ADMIN — MEROPENEM 1000 MG: 1 INJECTION INTRAVENOUS at 09:10

## 2024-10-24 RX ADMIN — GABAPENTIN 100 MG: 100 CAPSULE ORAL at 09:00

## 2024-10-24 RX ADMIN — CARVEDILOL 6.25 MG: 6.25 TABLET, FILM COATED ORAL at 09:00

## 2024-10-24 RX ADMIN — Medication 1 CAPSULE: at 09:00

## 2024-10-24 NOTE — PLAN OF CARE
Problem: Physical Therapy - Adult  Goal: By Discharge: Performs mobility at highest level of function for planned discharge setting.  See evaluation for individualized goals.  Description: FUNCTIONAL STATUS PRIOR TO ADMISSION: The patient  required moderate assistance for basic and instrumental ADLs. and The patient was functional at the wheelchair level and required moderate assistance x2 for transfers to the chair.    HOME SUPPORT PRIOR TO ADMISSION: The patient lived with [facility] staff in [facility].    Physical Therapy Goals  Initiated 10/20/2024  1.  Patient will move from supine to sit and sit to supine in bed with contact guard assist within 7 day(s).    2.  Patient will perform sit to stand with moderate assistance x2 within 7 day(s).  3.  Patient will transfer from bed to chair and chair to bed with moderate assistance x2 using the least restrictive device within 7 day(s).  4.  Patient will ambulate with moderate assistance x2 for 3x2 feet with the least restrictive device within 7 day(s).   Outcome: Progressing   PHYSICAL THERAPY TREATMENT    Patient: Siomara Collins (79 y.o. female)  Date: 10/24/2024  Diagnosis: UTI (urinary tract infection), bacterial [N39.0, A49.9]  Urinary tract infection due to ESBL Klebsiella [N39.0, B96.89]  Leukocytosis, unspecified type [D72.829]  Chest pain, unspecified type [R07.9] Urinary tract infection due to ESBL Klebsiella      Precautions: Fall Risk, Contact Precautions                      ASSESSMENT:  Patient continues to benefit from skilled PT services and is slowly progressing towards goals.  Excellent participation with therapy session. Verbal cues to maintain midline with BLE with heel slides and bridging activity in bed. Mod/Max A to come to sitting, Mod A  2/2 poor eccentric motor control with stand>sitting. performed sit<>stand x 7 tolerated up to 2 min without knee blocking within reinaldo steady. Pt remained in chair at end of session.

## 2024-10-24 NOTE — DISCHARGE SUMMARY
JORDYN RALPH [facility]  [address] [place] [address]  Tel: [phone]    Hospital Medicine Discharge Summary    Patient ID:    Siomara Collins  Age:              79 y.o.    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 *       Reason for admission:    UTI (urinary tract infection), bacterial [N39.0, A49.9]  Urinary tract infection due to ESBL Klebsiella [N39.0, B96.89]  Leukocytosis, unspecified type [D72.829]  Chest pain, unspecified type [R07.9]    Diagnostic testing:    Laboratory data reviewed and independently interpreted:    No results for input(s): \"WBC\", \"HGB\", \"HCT\", \"RBC\", \"MCV\", \"MCH\", \"PLT\" in the last 72 hours.  No results found for: \"LACTA\"  Recent Labs     10/22/24  0721 10/23/24  0519   BILITOT 1.2* 0.9   ALKPHOS 291* 321*   AST 77* 54*   * 126*     No components found for: \"GLUCOSEPOC\"  Lab Results   Component Value Date/Time    CHOL 318 2024 02:43 PM    TRIG 240 2024 02:43 PM    HDL 56 2024 02:43 PM       Imaging data reviewed:    US ABDOMEN LIMITED Specify organ? LIVER    Result Date: 10/20/2024  Hepatic steatosis. Contracted gallbladder. Otherwise unremarkable. Electronically signed by NAYELI GONSALEZ    CT ABDOMEN PELVIS  tablet Take 1 tablet by mouth daily  Qty: 90 tablet, Refills: 3      blood glucose monitor strips Test 4 times a day & as needed for symptoms of irregular blood glucose. Dispense sufficient amount for indicated testing frequency plus additional to accommodate PRN testing needs.  Qty: 400 strip, Refills: 0    Comments: (1) Identify specific brand and product.  (2) Include specific quantity.  (3) Include frequency.  Associated Diagnoses: Uncontrolled type 2 diabetes mellitus with hyperglycemia (HCC)      Insulin Pen Needle 32G X 4 MM MISC 1 each by Does not apply route daily  Qty: 100 each, Refills: 3    Associated Diagnoses: Uncontrolled type 2 diabetes mellitus with hyperglycemia (HCC)      Lancets 30G MISC 1 each by Does not apply route in the morning, at noon, in the evening, and at bedtime Test four times a day & as needed for symptoms of irregular blood glucose. Dispense sufficient amount for indicated testing frequency plus additional to accommodate PRN testing needs.  Qty: 100 each, Refills: 3    Associated Diagnoses: Uncontrolled type 2 diabetes mellitus with hyperglycemia (HCC)      acetaminophen (TYLENOL) 325 MG tablet Take 2 tablets by mouth every 6 hours as needed for Pain  Qty: 120 tablet, Refills: 0      montelukast (SINGULAIR) 10 MG tablet Take 1 tablet by mouth nightly  Qty: 30 tablet, Refills: 0      primidone (MYSOLINE) 50 MG tablet Take 1 tablet by mouth in the morning and at bedtime  Qty: 60 tablet, Refills: 0      rosuvastatin (CRESTOR) 20 MG tablet Take 1 tablet by mouth nightly  Qty: 30 tablet, Refills: 0      lanolin-petrolatum (A+D) ointment Apply topically 2 times daily as needed for Dry Skin  Qty: 454 g, Refills: 0      lidocaine (HM LIDOCAINE PATCH) 4 % external patch Place 1 patch onto the skin daily  Qty: 30 patch, Refills: 0      Multiple Vitamins-Minerals (EQ MULTIVITAMINS ADULT GUMMY) CHEW Take 1 tablet by mouth daily Naturemade gummy multivitamin  Qty: 30 tablet, Refills: 0

## 2024-10-24 NOTE — PROGRESS NOTES
Spiritual Health History and Assessment/Progress Note  [facility] [place]    Rituals, Rites and Sacraments,  , Adjustment to illness, Life Adjustments,      Name: Siomara Collins MRN: [id number]    Age: 79 y.o.     Sex: female   Language: English   Evangelical: Cheondoism   Urinary tract infection due to ESBL Klebsiella     Date: 10/24/2024            Total Time Calculated: 5 min              Spiritual Assessment began in [facility]        Referral/Consult From: Clergy/   Encounter Overview/Reason: Rituals, Rites and Sacraments  Service Provided For: Patient    Amy, Belief, Meaning:   Patient is connected with a amy tradition or spiritual practice  Family/Friends No family/friends present      Importance and Influence:  Patient unable to assess at this time  Family/Friends No family/friends present    Community:  Patient Other: unknown  Family/Friends No family/friends present    Assessment and Plan of Care:     Patient Interventions include: Provided sacramental/Restorationist ritual  Family/Friends Interventions include: No family/friends present    Patient Plan of Care: Spiritual Care available upon further referral  Family/Friends Plan of Care: Spiritual Care available upon further referral    Electronically signed by Chaplain BASILIO on 10/24/2024 at 1:26 PM     Rest Devices visit attempted.  Mrs. Collins is Cheondoism.  She was asleep.  No family was present at the time of the visit. Prayer for spiritual communion offered for her well-being.     Sr. CAMRYN Singh, RN, ACSW, LCSW   Page:  [phone]

## 2024-10-24 NOTE — PLAN OF CARE
Problem: Chronic Conditions and Co-morbidities  Goal: Patient's chronic conditions and co-morbidity symptoms are monitored and maintained or improved  10/24/2024 1140 by Mikie Ann RN  Outcome: Progressing  10/24/2024 0700 by Yoana Cheatham LPN  Outcome: Progressing     Problem: Discharge Planning  Goal: Discharge to home or other facility with appropriate resources  10/24/2024 1140 by Mikie Ann RN  Outcome: Progressing  10/24/2024 0700 by Yoana Cheatham LPN  Outcome: Progressing     Problem: Pain  Goal: Verbalizes/displays adequate comfort level or baseline comfort level  10/24/2024 1140 by Mikie Ann RN  Outcome: Progressing  10/24/2024 0700 by Yoana Cheatham LPN  Outcome: Progressing     Problem: Skin/Tissue Integrity  Goal: Absence of new skin breakdown  Description: 1.  Monitor for areas of redness and/or skin breakdown  2.  Assess vascular access sites hourly  3.  Every 4-6 hours minimum:  Change oxygen saturation probe site  4.  Every 4-6 hours:  If on nasal continuous positive airway pressure, respiratory therapy assess nares and determine need for appliance change or resting period.  10/24/2024 1140 by Mikie Ann RN  Outcome: Progressing  10/24/2024 0700 by Yoana Cheatham LPN  Outcome: Progressing     Problem: ABCDS Injury Assessment  Goal: Absence of physical injury  10/24/2024 1140 by Mikie Ann RN  Outcome: Progressing  10/24/2024 0700 by Yoana Cheatham LPN  Outcome: Progressing     Problem: Safety - Adult  Goal: Free from fall injury  10/24/2024 1140 by Mikie Ann RN  Outcome: Progressing  10/24/2024 0700 by Yoana Cheatham LPN  Outcome: Progressing

## 2024-10-24 NOTE — CONSULTS
Infectious Disease Consult    Impression/Plan   Recurrent Klebsiella pneumoniae UTI.  Urine culture also growing Proteus mirabilis.  Suspect chronic colonization.  No  abnormalities seen on CT.  Patient has been on meropenem for 6 days.  Will continue 1 more day to complete a total 7-day course of therapy.  IV antibiotic orders are in the chart.  Patient to be discharged to rehab facility.  Recommend patient retain peripheral IV for the additional day of therapy with meropenem  Interstitial lung disease.  Patient on prednisone 30 mg daily at baseline  Chronic immunosuppression.  Due to above  Diabetes mellitus.  This is poorly controlled.  Multiple antibiotic allergies including levofloxacin, penicillin, and sulfa drugs    Anti-infectives:   Meropenem    Subjective:   Date of Consultation:  October 24, 2024  Date of Admission: 10/18/2024   Referring Physician:     Patient is a 79 y.o. female with a past medical history significant for diabetes mellitus, stage II CKD, and interstitial lung disease on chronic prednisone who is being seen for UTI.    Patient is well-known to the infectious diseases service    She was seen on 7/28/2024 for positive urine culture which grew 2 strains of an ESBL producing Klebsiella pneumoniae.     Patient was hospitalized at [facility] from 7/2 - 7/8/24 for ESBL producing Klebsiella UTI.  The patient was treated with meropenem during the hospitalization and discharged on meropenem/ertapenem to complete a 10-day course of therapy.     Repeat urine culture was done 7/16 which revealed persistence of the ESBL producing Klebsiella.  She was started on Ceftin by her PCP with no improvement and subsequently sent to [facility] for further evaluation.   UA from both 7/16 and 7/26 were bland and not thought to be consistent with infection.  Urine culture from 7/26 ultimately grew 2 strains of ESBL Klebsiella pneumoniae and the patient completed 7-day course of therapy with a carbapenem antibiotic.  It was  multiple statins  Takes pravachol     Propoxyphene Other (See Comments)     \"I see worms\"    Codeine Rash     Rash on thighs    Metformin Diarrhea    Penicillins Rash    Sulfa Antibiotics Rash        Review of Systems:  Pertinent items are noted in the History of Present Illness.    Objective:   Blood pressure (!) 178/82, pulse 61, temperature 97.5 °F (36.4 °C), temperature source Oral, resp. rate 16, height 1.6 m (5' 3\"), weight 83 kg (182 lb 15.7 oz), SpO2 97%.  Temp (24hrs), Av.7 °F (36.5 °C), Min:97.5 °F (36.4 °C), Max:98.2 °F (36.8 °C)       Exam:    General:  Alert, cooperative, well noursished, well developed, appears stated age   Eyes:  Sclera anicteric.    Mouth/Throat: Mucous membranes normal   Neck: Supple   Lungs:   No distress   CV:     Abdomen:    non-distended.  No suprapubic tenderness to palpation   Extremities:    Skin: No acute rash on exposed skin   Lymph nodes:    Musculoskeletal:    Lines/Devices:  Peripheral   Psych: Alert and oriented, normal mood affect given the setting       Data Review:   Recent Results (from the past 24 hour(s))   POCT Glucose    Collection Time: 10/23/24 12:03 PM   Result Value Ref Range    POC Glucose 135 (H) 65 - 117 mg/dL    Performed by: COOKSEY Hannah    POCT Glucose    Collection Time: 10/23/24  4:32 PM   Result Value Ref Range    POC Glucose 126 (H) 65 - 117 mg/dL    Performed by: Francesca Breaux PCT    POCT Glucose    Collection Time: 10/23/24  8:26 PM   Result Value Ref Range    POC Glucose 141 (H) 65 - 117 mg/dL    Performed by: DESTINY SAUNDERS    POCT Glucose    Collection Time: 10/24/24  7:55 AM   Result Value Ref Range    POC Glucose 66 65 - 117 mg/dL    Performed by: Seth Deluna PCT    POCT Glucose    Collection Time: 10/24/24  7:56 AM   Result Value Ref Range    POC Glucose 68 65 - 117 mg/dL    Performed by: Seth Deluna PCT         Microbiology:      Studies:      Signed By: Jerry Briones DO     2024

## 2024-10-24 NOTE — PLAN OF CARE
time  Stand to Sit: Minimum assistance;Moderate assistance;Assist X1 (poor eccentric control)           Balance:     Balance  Sitting - Static: Fair (occasional)  Sitting - Dynamic: Fair (occasional)  Standing: Impaired;With support  Standing - Static: Constant support;Fair  Standing - Dynamic: Constant support;Fair      ADL Intervention:    Toileting: Maximum assistance  Toileting Skilled Clinical Factors: use of reinaldo steady bed to BSC, dep BM hygiene, wears brief      Activity Tolerance:   Fair   Please refer to the flowsheet for vital signs taken during this treatment.    After treatment:   Patient left in no apparent distress sitting up in chair and Call bell within reach    COMMUNICATION/EDUCATION:   The patient's plan of care was discussed with: physical therapy assistant and occupational therapist    Patient Education  Education Given To: Patient  Education Provided: Role of Therapy;Plan of Care  Education Method: Verbal  Barriers to Learning: None  Education Outcome: Verbalized understanding    Thank you for this referral.  JAYLENE Gandhi  Minutes: 31

## 2024-10-24 NOTE — CARE COORDINATION
10/24/2024  12:27 PM  Care Management Progress Note    Reason for Admission:   UTI (urinary tract infection), bacterial [N39.0, A49.9]  Urinary tract infection due to ESBL Klebsiella [N39.0, B96.89]  Leukocytosis, unspecified type [D72.829]  Chest pain, unspecified type [R07.9]         Patient Admission Status: Inpatient  Date Admitted to INP: 32% []NA - OBS/Outpatient  RUR: Readmission Risk Score: 31.6    Hospitalization in the last 30 days (Readmission):  No        Transition of care plan:  Discharge order submitted. Pt has medically cleared.   IPR - CM received acceptance from [facility], and they can admit today. CM notified that pt will require IV abx through tomorrow. Per [facility]'s request and attending's approval, pt's IV is to remain intact and will be removed by [facility] Abhishek tomorrow upon completion of IV abx. Nursing notified. Pt notified and agreeable to DC today. Nursing, please call report to .   Date IM given: 10/24/24  []NA  Outpatient follow-up.  [facility]. PCS completed, and included in packet on chart.        10/20/24 6231   Social/Functional History   Lives With Alone   Type of Home Assisted living  ([facility])   Home Layout One level   Home Access Elevator   Bathroom Shower/Tub Walk-in shower  (sponge bathes in bed by staff)   Home Equipment Hospital bed;Wheelchair - Manual;Walker - Rolling   Receives Help From Personal care attendant   ADL Assistance Needs assistance   Bath Moderate assistance   Dressing Moderate assistance   Grooming Supervision   Feeding Modified independent    Toileting Needs assistance   Homemaking Responsibilities No   Ambulation Assistance Non-ambulatory   Transfer Assistance Needs assistance  (max A x2 per pt)   Active  No   Patient's  Info Medical transport/family   Occupation Retired   Type of Occupation  for the [employer]   Services At/After Discharge

## 2024-10-24 NOTE — PLAN OF CARE
Problem: Chronic Conditions and Co-morbidities  Goal: Patient's chronic conditions and co-morbidity symptoms are monitored and maintained or improved  10/24/2024 1519 by Mikie Ann RN  Outcome: Completed  10/24/2024 1140 by Mikie Ann RN  Outcome: Progressing  10/24/2024 0700 by Yoana Cheatham LPN  Outcome: Progressing     Problem: Discharge Planning  Goal: Discharge to home or other facility with appropriate resources  10/24/2024 1519 by Mikie Ann RN  Outcome: Completed  10/24/2024 1140 by Mikie Ann RN  Outcome: Progressing  10/24/2024 0700 by Yoana Cheatham LPN  Outcome: Progressing     Problem: Pain  Goal: Verbalizes/displays adequate comfort level or baseline comfort level  10/24/2024 1519 by Mikie Ann RN  Outcome: Completed  10/24/2024 1140 by Mikie Ann RN  Outcome: Progressing  10/24/2024 0700 by Yoana Cheatham LPN  Outcome: Progressing     Problem: Skin/Tissue Integrity  Goal: Absence of new skin breakdown  Description: 1.  Monitor for areas of redness and/or skin breakdown  2.  Assess vascular access sites hourly  3.  Every 4-6 hours minimum:  Change oxygen saturation probe site  4.  Every 4-6 hours:  If on nasal continuous positive airway pressure, respiratory therapy assess nares and determine need for appliance change or resting period.  10/24/2024 1519 by Mikie Ann RN  Outcome: Completed  10/24/2024 1140 by Mikie Ann RN  Outcome: Progressing  10/24/2024 0700 by Yoana Cheatham LPN  Outcome: Progressing     Problem: ABCDS Injury Assessment  Goal: Absence of physical injury  10/24/2024 1519 by Mikie Ann RN  Outcome: Completed  10/24/2024 1140 by Mikie Ann RN  Outcome: Progressing  10/24/2024 0700 by Yoana Cheatham LPN  Outcome: Progressing     Problem: Safety - Adult  Goal: Free from fall injury  10/24/2024 1519 by Mikie Ann RN  Outcome: Completed  10/24/2024 1140 by Mikie Ann RN  Outcome: Progressing  10/24/2024 0700 by Yoana Cheatham

## 2024-10-24 NOTE — PROGRESS NOTES
Patient was handed a copy of discharge instructions which were read and explained to patient. New medications read and explained, patient verbalized understanding. Patient aware that medications have been sent electronically to pharmacy of choice. All questions and concerns were addressed, IVs were removed, VS stable and patient was sent with all belongings. Report given to transport and report called to facility with questions and concerns addressed. Gave call back number in case they have follow up questions

## 2024-10-24 NOTE — PROGRESS NOTES
Post Discharge PICC and Antibiotic Orders    1.  Diagnosis: ESBL Klebsiella UTI  2.  Antibiotic: Meropenem 1 g IV every 8 hours through 10/25/2024  3.  Routine peripheral IV care.  Please remove IV once antibiotics are complete   4.  Weekly labs: None  5. Allergies:    Allergies   Allergen Reactions    Levaquin [Levofloxacin] Itching, Dermatitis and Rash    Statins Myalgia     Myalgia with  multiple statins  Takes pravachol     Propoxyphene Other (See Comments)     \"I see worms\"    Codeine Rash     Rash on thighs    Metformin Diarrhea    Penicillins Rash    Sulfa Antibiotics Rash       Jerry Briones, DO

## 2024-11-06 ENCOUNTER — TELEPHONE (OUTPATIENT)
Age: 79
End: 2024-11-06

## 2024-11-06 NOTE — TELEPHONE ENCOUNTER
Orders from [facility] received via fax, Novolin N 40 units daily, Lantus 50 units in the AM and Novolog 10 units TID. Confirmed orders with Carmencita and she stated pt no longer has a sliding scale. Carmencita asked that Dr Colon fax his orders for pt's insulin and an order stating how many times a day he wants pt to test blood sugars to [phone].

## 2024-11-06 NOTE — TELEPHONE ENCOUNTER
Pt LVM stating she has questions regarding her insulin dose.  Carmencita from [facility] LVM stating she is pt's wellness nurse and pt has been released from rehab with different orders. Carmencita asked what does Dr Colon want pt to take as far as her diabetes medication.

## 2024-11-06 NOTE — TELEPHONE ENCOUNTER
I printed a letter (I think it went to [facility] but if not you can reprint it from the letters section) that you can fax to [facility] with the correct orders.  Thanks!

## 2024-11-06 NOTE — TELEPHONE ENCOUNTER
Please call her to clarify what orders she has currently so I can determine how close they are to what I recommended at her last visit.

## 2024-11-15 ENCOUNTER — OFFICE VISIT (OUTPATIENT)
Facility: CLINIC | Age: 79
End: 2024-11-15

## 2024-11-15 VITALS
HEIGHT: 63 IN | HEART RATE: 60 BPM | SYSTOLIC BLOOD PRESSURE: 136 MMHG | WEIGHT: 182 LBS | OXYGEN SATURATION: 92 % | TEMPERATURE: 100.1 F | BODY MASS INDEX: 32.25 KG/M2 | RESPIRATION RATE: 18 BRPM | DIASTOLIC BLOOD PRESSURE: 84 MMHG

## 2024-11-15 DIAGNOSIS — G62.9 NEUROPATHY: ICD-10-CM

## 2024-11-15 DIAGNOSIS — N39.0 RECURRENT UTI: Primary | ICD-10-CM

## 2024-11-15 DIAGNOSIS — J84.9 INTERSTITIAL LUNG DISEASE (HCC): ICD-10-CM

## 2024-11-15 DIAGNOSIS — F41.9 ANXIETY: ICD-10-CM

## 2024-11-15 DIAGNOSIS — R53.81 PHYSICAL DEBILITY: ICD-10-CM

## 2024-11-15 DIAGNOSIS — I12.9 HYPERTENSION WITH RENAL DISEASE: ICD-10-CM

## 2024-11-15 DIAGNOSIS — E11.22 CONTROLLED TYPE 2 DIABETES MELLITUS WITH STAGE 2 CHRONIC KIDNEY DISEASE, WITH LONG-TERM CURRENT USE OF INSULIN (HCC): ICD-10-CM

## 2024-11-15 DIAGNOSIS — R79.89 ELEVATED LFTS: ICD-10-CM

## 2024-11-15 DIAGNOSIS — Z79.4 CONTROLLED TYPE 2 DIABETES MELLITUS WITH STAGE 2 CHRONIC KIDNEY DISEASE, WITH LONG-TERM CURRENT USE OF INSULIN (HCC): ICD-10-CM

## 2024-11-15 DIAGNOSIS — M35.3 POLYMYALGIA RHEUMATICA (HCC): ICD-10-CM

## 2024-11-15 DIAGNOSIS — N18.2 CONTROLLED TYPE 2 DIABETES MELLITUS WITH STAGE 2 CHRONIC KIDNEY DISEASE, WITH LONG-TERM CURRENT USE OF INSULIN (HCC): ICD-10-CM

## 2024-11-15 RX ORDER — DOXYCYCLINE HYCLATE 100 MG
100 TABLET ORAL 2 TIMES DAILY
Qty: 20 TABLET | Refills: 0 | Status: SHIPPED | OUTPATIENT
Start: 2024-11-15 | End: 2024-11-25

## 2024-11-15 RX ORDER — QUETIAPINE FUMARATE 25 MG/1
25 TABLET, FILM COATED ORAL 2 TIMES DAILY
Qty: 60 TABLET | Refills: 3 | Status: SHIPPED | OUTPATIENT
Start: 2024-11-15

## 2024-11-15 RX ORDER — ACETAMINOPHEN 500 MG
1000 TABLET ORAL 4 TIMES DAILY PRN
Qty: 120 TABLET | Refills: 5 | Status: SHIPPED | OUTPATIENT
Start: 2024-11-15

## 2024-11-15 RX ORDER — SEMAGLUTIDE 0.68 MG/ML
0.25 INJECTION, SOLUTION SUBCUTANEOUS WEEKLY
Qty: 3 ML | Refills: 2 | Status: SHIPPED | OUTPATIENT
Start: 2024-11-15

## 2024-11-15 NOTE — PROGRESS NOTES
Siomara Collins is a 79 y.o. female     Chief Complaint   Patient presents with    Follow-Up from Hospital     Hospital f/u from [facility]       BP (!) 158/88 (Site: Left Upper Arm, Position: Sitting, Cuff Size: Large Adult)   Pulse 60   Temp 100.1 °F (37.8 °C) (Oral)   Resp 18   Ht 1.6 m (5' 3\")   Wt 82.6 kg (182 lb) Comment: refused weight today  SpO2 92%   BMI 32.24 kg/m²     Health Maintenance Due   Topic Date Due    COVID-19 Vaccine (1) Never done    DEXA (modify frequency per FRAX score)  Never done    Shingles vaccine (1 of 2) 02/26/2017    Respiratory Syncytial Virus (RSV) Pregnant or age 60 yrs+ (1 - 1-dose 75+ series) Never done    Flu vaccine (1) 08/01/2024         \"Have you been to the ER, urgent care clinic since your last visit?  Hospitalized since your last visit?\"    YES - When: approximately 3  weeks ago.  Where and Why: [facility].    “Have you seen or consulted any other health care providers outside of [facility] since your last visit?”    NO                     
Question:   Specify (ex-cath, midstream, cysto, etc)?     Answer:   f    Urinalysis with Microscopic     Standing Status:   Future     Standing Expiration Date:   11/14/2025     Order Specific Question:   SPECIFY(EX-CATH,MIDSTREAM,CYSTO,ETC)?     Answer:   f    CBC with Auto Differential     Standing Status:   Future     Number of Occurrences:   1     Standing Expiration Date:   11/14/2025    Basic Metabolic Panel     Standing Status:   Future     Number of Occurrences:   1     Standing Expiration Date:   11/14/2025          No results found for any visits on 11/15/24.    I have reviewed the patient's medical history in detail and updated the computerized patient record.     We had a prolonged discussion about these complex clinical issues and went over the various important aspects to consider. All questions were answered.     Advised her to call back or return to office if symptoms do not improve, change in nature, or persist.    She was given an after visit summary or informed of [facility] which includes patient instructions, diagnoses, current medications, & vitals.  She expressed understanding with the diagnosis and plan.

## 2024-11-16 LAB
ANION GAP SERPL CALC-SCNC: 5 MMOL/L (ref 2–12)
BASOPHILS # BLD: 0 K/UL (ref 0–0.1)
BASOPHILS NFR BLD: 0 % (ref 0–1)
BUN SERPL-MCNC: 24 MG/DL (ref 6–20)
BUN/CREAT SERPL: 29 (ref 12–20)
CALCIUM SERPL-MCNC: 8.7 MG/DL (ref 8.5–10.1)
CHLORIDE SERPL-SCNC: 108 MMOL/L (ref 97–108)
CO2 SERPL-SCNC: 31 MMOL/L (ref 21–32)
CREAT SERPL-MCNC: 0.84 MG/DL (ref 0.55–1.02)
DIFFERENTIAL METHOD BLD: ABNORMAL
EOSINOPHIL # BLD: 0 K/UL (ref 0–0.4)
EOSINOPHIL NFR BLD: 0 % (ref 0–7)
ERYTHROCYTE [DISTWIDTH] IN BLOOD BY AUTOMATED COUNT: 13.7 % (ref 11.5–14.5)
GLUCOSE SERPL-MCNC: 271 MG/DL (ref 65–100)
HCT VFR BLD AUTO: 41.8 % (ref 35–47)
HGB BLD-MCNC: 13.4 G/DL (ref 11.5–16)
IMM GRANULOCYTES # BLD AUTO: 0 K/UL (ref 0–0.04)
IMM GRANULOCYTES NFR BLD AUTO: 0 % (ref 0–0.5)
LYMPHOCYTES # BLD: 0.4 K/UL (ref 0.8–3.5)
LYMPHOCYTES NFR BLD: 4 % (ref 12–49)
MCH RBC QN AUTO: 32.4 PG (ref 26–34)
MCHC RBC AUTO-ENTMCNC: 32.1 G/DL (ref 30–36.5)
MCV RBC AUTO: 101.2 FL (ref 80–99)
MONOCYTES # BLD: 0.3 K/UL (ref 0–1)
MONOCYTES NFR BLD: 3 % (ref 5–13)
NEUTS SEG # BLD: 9.2 K/UL (ref 1.8–8)
NEUTS SEG NFR BLD: 93 % (ref 32–75)
NRBC # BLD: 0 K/UL (ref 0–0.01)
NRBC BLD-RTO: 0 PER 100 WBC
PLATELET # BLD AUTO: 188 K/UL (ref 150–400)
PMV BLD AUTO: 10.9 FL (ref 8.9–12.9)
POTASSIUM SERPL-SCNC: 4 MMOL/L (ref 3.5–5.1)
RBC # BLD AUTO: 4.13 M/UL (ref 3.8–5.2)
RBC MORPH BLD: ABNORMAL
SODIUM SERPL-SCNC: 144 MMOL/L (ref 136–145)
WBC # BLD AUTO: 9.9 K/UL (ref 3.6–11)

## 2024-11-19 ENCOUNTER — TELEPHONE (OUTPATIENT)
Facility: CLINIC | Age: 79
End: 2024-11-19

## 2024-11-19 NOTE — TELEPHONE ENCOUNTER
Patient called in requesting an order for a hospital bed that goes to the floor to be faxed to [facility] at fax: [phone].

## 2024-11-22 ENCOUNTER — TELEPHONE (OUTPATIENT)
Facility: CLINIC | Age: 79
End: 2024-11-22

## 2024-11-22 ENCOUNTER — HOSPITAL ENCOUNTER (INPATIENT)
Facility: HOSPITAL | Age: 79
LOS: 4 days | Discharge: NURSING, REHAB OR LONG-TERM CARE | DRG: 243 | End: 2024-11-26
Attending: STUDENT IN AN ORGANIZED HEALTH CARE EDUCATION/TRAINING PROGRAM | Admitting: HOSPITALIST
Payer: MEDICARE

## 2024-11-22 DIAGNOSIS — I50.9 ACUTE ON CHRONIC CONGESTIVE HEART FAILURE, UNSPECIFIED HEART FAILURE TYPE (HCC): ICD-10-CM

## 2024-11-22 DIAGNOSIS — Z79.4 CONTROLLED TYPE 2 DIABETES MELLITUS WITH STAGE 2 CHRONIC KIDNEY DISEASE, WITH LONG-TERM CURRENT USE OF INSULIN (HCC): ICD-10-CM

## 2024-11-22 DIAGNOSIS — N18.2 CONTROLLED TYPE 2 DIABETES MELLITUS WITH STAGE 2 CHRONIC KIDNEY DISEASE, WITH LONG-TERM CURRENT USE OF INSULIN (HCC): ICD-10-CM

## 2024-11-22 DIAGNOSIS — R00.1 BRADYCARDIA: ICD-10-CM

## 2024-11-22 DIAGNOSIS — E11.22 CONTROLLED TYPE 2 DIABETES MELLITUS WITH STAGE 2 CHRONIC KIDNEY DISEASE, WITH LONG-TERM CURRENT USE OF INSULIN (HCC): ICD-10-CM

## 2024-11-22 DIAGNOSIS — R00.1 SYMPTOMATIC BRADYCARDIA: Primary | ICD-10-CM

## 2024-11-22 LAB
ALBUMIN SERPL-MCNC: 3.2 G/DL (ref 3.5–5)
ALBUMIN/GLOB SERPL: 0.9 (ref 1.1–2.2)
ALP SERPL-CCNC: 121 U/L (ref 45–117)
ALT SERPL-CCNC: 58 U/L (ref 12–78)
ANION GAP SERPL CALC-SCNC: 4 MMOL/L (ref 2–12)
APPEARANCE UR: CLEAR
AST SERPL-CCNC: 34 U/L (ref 15–37)
BACTERIA URNS QL MICRO: NEGATIVE /HPF
BASOPHILS # BLD: 0 K/UL (ref 0–0.1)
BASOPHILS NFR BLD: 0 % (ref 0–1)
BILIRUB SERPL-MCNC: 0.9 MG/DL (ref 0.2–1)
BILIRUB UR QL: NEGATIVE
BUN SERPL-MCNC: 29 MG/DL (ref 6–20)
BUN/CREAT SERPL: 35 (ref 12–20)
CALCIUM SERPL-MCNC: 8.9 MG/DL (ref 8.5–10.1)
CHLORIDE SERPL-SCNC: 107 MMOL/L (ref 97–108)
CO2 SERPL-SCNC: 32 MMOL/L (ref 21–32)
COLOR UR: ABNORMAL
COMMENT:: NORMAL
CREAT SERPL-MCNC: 0.83 MG/DL (ref 0.55–1.02)
DIFFERENTIAL METHOD BLD: ABNORMAL
EOSINOPHIL # BLD: 0 K/UL (ref 0–0.4)
EOSINOPHIL NFR BLD: 0 % (ref 0–7)
EPITH CASTS URNS QL MICRO: ABNORMAL /LPF
ERYTHROCYTE [DISTWIDTH] IN BLOOD BY AUTOMATED COUNT: 13.9 % (ref 11.5–14.5)
GLOBULIN SER CALC-MCNC: 3.4 G/DL (ref 2–4)
GLUCOSE BLD STRIP.AUTO-MCNC: 235 MG/DL (ref 65–117)
GLUCOSE SERPL-MCNC: 220 MG/DL (ref 65–100)
GLUCOSE UR STRIP.AUTO-MCNC: 500 MG/DL
HCT VFR BLD AUTO: 40.5 % (ref 35–47)
HGB BLD-MCNC: 13.3 G/DL (ref 11.5–16)
HGB UR QL STRIP: NEGATIVE
HYALINE CASTS URNS QL MICRO: ABNORMAL /LPF (ref 0–2)
IMM GRANULOCYTES # BLD AUTO: 0.1 K/UL (ref 0–0.04)
IMM GRANULOCYTES NFR BLD AUTO: 1 % (ref 0–0.5)
KETONES UR QL STRIP.AUTO: NEGATIVE MG/DL
LEUKOCYTE ESTERASE UR QL STRIP.AUTO: NEGATIVE
LYMPHOCYTES # BLD: 0.4 K/UL (ref 0.8–3.5)
LYMPHOCYTES NFR BLD: 5 % (ref 12–49)
MAGNESIUM SERPL-MCNC: 1.7 MG/DL (ref 1.6–2.4)
MCH RBC QN AUTO: 32.7 PG (ref 26–34)
MCHC RBC AUTO-ENTMCNC: 32.8 G/DL (ref 30–36.5)
MCV RBC AUTO: 99.5 FL (ref 80–99)
MONOCYTES # BLD: 0.2 K/UL (ref 0–1)
MONOCYTES NFR BLD: 3 % (ref 5–13)
NEUTS SEG # BLD: 7.2 K/UL (ref 1.8–8)
NEUTS SEG NFR BLD: 91 % (ref 32–75)
NITRITE UR QL STRIP.AUTO: NEGATIVE
NRBC # BLD: 0 K/UL (ref 0–0.01)
NRBC BLD-RTO: 0 PER 100 WBC
PH UR STRIP: 7.5 (ref 5–8)
PLATELET # BLD AUTO: 189 K/UL (ref 150–400)
PMV BLD AUTO: 10.4 FL (ref 8.9–12.9)
POTASSIUM SERPL-SCNC: 4.2 MMOL/L (ref 3.5–5.1)
PROT SERPL-MCNC: 6.6 G/DL (ref 6.4–8.2)
PROT UR STRIP-MCNC: NEGATIVE MG/DL
RBC # BLD AUTO: 4.07 M/UL (ref 3.8–5.2)
RBC #/AREA URNS HPF: ABNORMAL /HPF (ref 0–5)
RBC MORPH BLD: ABNORMAL
SERVICE CMNT-IMP: ABNORMAL
SODIUM SERPL-SCNC: 143 MMOL/L (ref 136–145)
SP GR UR REFRACTOMETRY: 1.01 (ref 1–1.03)
SPECIMEN HOLD: NORMAL
TROPONIN I SERPL HS-MCNC: 34 NG/L (ref 0–51)
URINE CULTURE IF INDICATED: ABNORMAL
UROBILINOGEN UR QL STRIP.AUTO: 1 EU/DL (ref 0.2–1)
WBC # BLD AUTO: 7.9 K/UL (ref 3.6–11)
WBC URNS QL MICRO: ABNORMAL /HPF (ref 0–4)

## 2024-11-22 PROCEDURE — 84439 ASSAY OF FREE THYROXINE: CPT

## 2024-11-22 PROCEDURE — 36415 COLL VENOUS BLD VENIPUNCTURE: CPT

## 2024-11-22 PROCEDURE — 1100000000 HC RM PRIVATE

## 2024-11-22 PROCEDURE — 83735 ASSAY OF MAGNESIUM: CPT

## 2024-11-22 PROCEDURE — 93005 ELECTROCARDIOGRAM TRACING: CPT | Performed by: STUDENT IN AN ORGANIZED HEALTH CARE EDUCATION/TRAINING PROGRAM

## 2024-11-22 PROCEDURE — 81001 URINALYSIS AUTO W/SCOPE: CPT

## 2024-11-22 PROCEDURE — 99285 EMERGENCY DEPT VISIT HI MDM: CPT

## 2024-11-22 PROCEDURE — 84484 ASSAY OF TROPONIN QUANT: CPT

## 2024-11-22 PROCEDURE — 93005 ELECTROCARDIOGRAM TRACING: CPT | Performed by: HOSPITALIST

## 2024-11-22 PROCEDURE — 6370000000 HC RX 637 (ALT 250 FOR IP): Performed by: HOSPITALIST

## 2024-11-22 PROCEDURE — 85025 COMPLETE CBC W/AUTO DIFF WBC: CPT

## 2024-11-22 PROCEDURE — 82962 GLUCOSE BLOOD TEST: CPT

## 2024-11-22 PROCEDURE — 84443 ASSAY THYROID STIM HORMONE: CPT

## 2024-11-22 PROCEDURE — 80053 COMPREHEN METABOLIC PANEL: CPT

## 2024-11-22 RX ORDER — BUMETANIDE 1 MG/1
1 TABLET ORAL DAILY
Status: DISCONTINUED | OUTPATIENT
Start: 2024-11-23 | End: 2024-11-26 | Stop reason: HOSPADM

## 2024-11-22 RX ORDER — ROSUVASTATIN CALCIUM 10 MG/1
20 TABLET, COATED ORAL NIGHTLY
Status: DISCONTINUED | OUTPATIENT
Start: 2024-11-22 | End: 2024-11-26 | Stop reason: HOSPADM

## 2024-11-22 RX ORDER — ENOXAPARIN SODIUM 100 MG/ML
40 INJECTION SUBCUTANEOUS DAILY
Status: DISCONTINUED | OUTPATIENT
Start: 2024-11-23 | End: 2024-11-26 | Stop reason: HOSPADM

## 2024-11-22 RX ORDER — DOXYCYCLINE HYCLATE 100 MG
100 TABLET ORAL EVERY 12 HOURS SCHEDULED
Status: DISPENSED | OUTPATIENT
Start: 2024-11-22 | End: 2024-11-25

## 2024-11-22 RX ORDER — INSULIN LISPRO 100 [IU]/ML
0-8 INJECTION, SOLUTION INTRAVENOUS; SUBCUTANEOUS
Status: DISCONTINUED | OUTPATIENT
Start: 2024-11-22 | End: 2024-11-26 | Stop reason: HOSPADM

## 2024-11-22 RX ORDER — QUETIAPINE FUMARATE 25 MG/1
25 TABLET, FILM COATED ORAL 2 TIMES DAILY
Status: DISCONTINUED | OUTPATIENT
Start: 2024-11-22 | End: 2024-11-26 | Stop reason: HOSPADM

## 2024-11-22 RX ORDER — INSULIN GLARGINE 100 [IU]/ML
30 INJECTION, SOLUTION SUBCUTANEOUS DAILY
Status: DISCONTINUED | OUTPATIENT
Start: 2024-11-23 | End: 2024-11-23

## 2024-11-22 RX ORDER — PANTOPRAZOLE SODIUM 40 MG/1
40 TABLET, DELAYED RELEASE ORAL
Status: DISCONTINUED | OUTPATIENT
Start: 2024-11-23 | End: 2024-11-26 | Stop reason: HOSPADM

## 2024-11-22 RX ORDER — LANOLIN ALCOHOL/MO/W.PET/CERES
400 CREAM (GRAM) TOPICAL ONCE
Status: COMPLETED | OUTPATIENT
Start: 2024-11-22 | End: 2024-11-23

## 2024-11-22 RX ORDER — ACETAMINOPHEN 500 MG
1000 TABLET ORAL 4 TIMES DAILY PRN
Status: DISCONTINUED | OUTPATIENT
Start: 2024-11-22 | End: 2024-11-26 | Stop reason: HOSPADM

## 2024-11-22 RX ORDER — PRIMIDONE 50 MG/1
50 TABLET ORAL 2 TIMES DAILY
Status: DISCONTINUED | OUTPATIENT
Start: 2024-11-23 | End: 2024-11-26 | Stop reason: HOSPADM

## 2024-11-22 RX ORDER — EZETIMIBE 10 MG/1
10 TABLET ORAL NIGHTLY
Status: DISCONTINUED | OUTPATIENT
Start: 2024-11-22 | End: 2024-11-26 | Stop reason: HOSPADM

## 2024-11-22 RX ORDER — MONTELUKAST SODIUM 10 MG/1
10 TABLET ORAL NIGHTLY
Status: DISCONTINUED | OUTPATIENT
Start: 2024-11-22 | End: 2024-11-26 | Stop reason: HOSPADM

## 2024-11-22 RX ORDER — GABAPENTIN 100 MG/1
100 CAPSULE ORAL 2 TIMES DAILY
Status: DISCONTINUED | OUTPATIENT
Start: 2024-11-23 | End: 2024-11-23

## 2024-11-22 RX ADMIN — EZETIMIBE 10 MG: 10 TABLET ORAL at 22:58

## 2024-11-22 RX ADMIN — INSULIN LISPRO 2 UNITS: 100 INJECTION, SOLUTION INTRAVENOUS; SUBCUTANEOUS at 22:55

## 2024-11-22 RX ADMIN — ROSUVASTATIN CALCIUM 20 MG: 10 TABLET, FILM COATED ORAL at 22:59

## 2024-11-22 RX ADMIN — MONTELUKAST 10 MG: 10 TABLET, FILM COATED ORAL at 22:59

## 2024-11-22 RX ADMIN — DOXYCYCLINE HYCLATE 100 MG: 100 TABLET, COATED ORAL at 22:59

## 2024-11-22 ASSESSMENT — PAIN - FUNCTIONAL ASSESSMENT: PAIN_FUNCTIONAL_ASSESSMENT: 0-10

## 2024-11-22 ASSESSMENT — PAIN SCALES - GENERAL: PAINLEVEL_OUTOF10: 0

## 2024-11-22 NOTE — TELEPHONE ENCOUNTER
Patient called in stating that she is in a skilled nursing facility and that her pulse over the last four to five days has been in the forties. She advised the in house NP discontinued her lisinopril last night however, this has not made any difference. She would like to check with Dr. Vang to see what he would recommend?

## 2024-11-23 ENCOUNTER — APPOINTMENT (OUTPATIENT)
Facility: HOSPITAL | Age: 79
DRG: 243 | End: 2024-11-23
Attending: HOSPITALIST
Payer: MEDICARE

## 2024-11-23 LAB
ANION GAP SERPL CALC-SCNC: 4 MMOL/L (ref 2–12)
BASOPHILS # BLD: 0 K/UL (ref 0–0.1)
BASOPHILS NFR BLD: 0 % (ref 0–1)
BUN SERPL-MCNC: 29 MG/DL (ref 6–20)
BUN/CREAT SERPL: 37 (ref 12–20)
CALCIUM SERPL-MCNC: 8.5 MG/DL (ref 8.5–10.1)
CHLORIDE SERPL-SCNC: 107 MMOL/L (ref 97–108)
CO2 SERPL-SCNC: 32 MMOL/L (ref 21–32)
CREAT SERPL-MCNC: 0.79 MG/DL (ref 0.55–1.02)
DIFFERENTIAL METHOD BLD: ABNORMAL
ECHO AO ARCH DIAM: 2.5 CM
ECHO AO ASC DIAM: 3.8 CM
ECHO AO ASCENDING AORTA INDEX: 2.04 CM/M2
ECHO AO ROOT DIAM: 3.6 CM
ECHO AO ROOT INDEX: 1.94 CM/M2
ECHO AV AREA PEAK VELOCITY: 1.2 CM2
ECHO AV AREA VTI: 1.4 CM2
ECHO AV AREA/BSA PEAK VELOCITY: 0.6 CM2/M2
ECHO AV AREA/BSA VTI: 0.8 CM2/M2
ECHO AV MEAN GRADIENT: 14 MMHG
ECHO AV MEAN VELOCITY: 1.7 M/S
ECHO AV PEAK GRADIENT: 32 MMHG
ECHO AV PEAK VELOCITY: 2.8 M/S
ECHO AV VELOCITY RATIO: 0.29
ECHO AV VTI: 68.3 CM
ECHO BSA: 1.92 M2
ECHO LA DIAMETER INDEX: 1.34 CM/M2
ECHO LA DIAMETER: 2.5 CM
ECHO LA TO AORTIC ROOT RATIO: 0.69
ECHO LA VOL A-L A2C: 39 ML (ref 22–52)
ECHO LA VOL A-L A4C: 43 ML (ref 22–52)
ECHO LA VOL BP: 39 ML (ref 22–52)
ECHO LA VOL MOD A2C: 35 ML (ref 22–52)
ECHO LA VOL MOD A4C: 40 ML (ref 22–52)
ECHO LA VOL/BSA BIPLANE: 21 ML/M2 (ref 16–34)
ECHO LA VOLUME AREA LENGTH: 42 ML
ECHO LA VOLUME INDEX A-L A2C: 21 ML/M2 (ref 16–34)
ECHO LA VOLUME INDEX A-L A4C: 23 ML/M2 (ref 16–34)
ECHO LA VOLUME INDEX AREA LENGTH: 23 ML/M2 (ref 16–34)
ECHO LA VOLUME INDEX MOD A2C: 19 ML/M2 (ref 16–34)
ECHO LA VOLUME INDEX MOD A4C: 22 ML/M2 (ref 16–34)
ECHO LV E' LATERAL VELOCITY: 6.66 CM/S
ECHO LV E' SEPTAL VELOCITY: 8.86 CM/S
ECHO LV EDV A2C: 37 ML
ECHO LV EDV A4C: 87 ML
ECHO LV EDV BP: 62 ML (ref 56–104)
ECHO LV EDV INDEX A4C: 47 ML/M2
ECHO LV EDV INDEX BP: 33 ML/M2
ECHO LV EDV NDEX A2C: 20 ML/M2
ECHO LV EJECTION FRACTION A2C: 40 %
ECHO LV EJECTION FRACTION A4C: 60 %
ECHO LV EJECTION FRACTION BIPLANE: 54 % (ref 55–100)
ECHO LV ESV A2C: 23 ML
ECHO LV ESV A4C: 35 ML
ECHO LV ESV BP: 28 ML (ref 19–49)
ECHO LV ESV INDEX A2C: 12 ML/M2
ECHO LV ESV INDEX A4C: 19 ML/M2
ECHO LV ESV INDEX BP: 15 ML/M2
ECHO LV FRACTIONAL SHORTENING: 9 % (ref 28–44)
ECHO LV INTERNAL DIMENSION DIASTOLE INDEX: 2.53 CM/M2
ECHO LV INTERNAL DIMENSION DIASTOLIC: 4.7 CM (ref 3.9–5.3)
ECHO LV INTERNAL DIMENSION SYSTOLIC INDEX: 2.31 CM/M2
ECHO LV INTERNAL DIMENSION SYSTOLIC: 4.3 CM
ECHO LV IVSD: 0.8 CM (ref 0.6–0.9)
ECHO LV MASS 2D: 122.3 G (ref 67–162)
ECHO LV MASS INDEX 2D: 65.7 G/M2 (ref 43–95)
ECHO LV POSTERIOR WALL DIASTOLIC: 0.8 CM (ref 0.6–0.9)
ECHO LV RELATIVE WALL THICKNESS RATIO: 0.34
ECHO LVOT AREA: 4.2 CM2
ECHO LVOT AV VTI INDEX: 0.32
ECHO LVOT DIAM: 2.3 CM
ECHO LVOT MEAN GRADIENT: 1 MMHG
ECHO LVOT PEAK GRADIENT: 3 MMHG
ECHO LVOT PEAK VELOCITY: 0.8 M/S
ECHO LVOT STROKE VOLUME INDEX: 49.3 ML/M2
ECHO LVOT SV: 91.8 ML
ECHO LVOT VTI: 22.1 CM
ECHO MV A VELOCITY: 1.05 M/S
ECHO MV E DECELERATION TIME (DT): 118.7 MS
ECHO MV E VELOCITY: 1.05 M/S
ECHO MV E/A RATIO: 1
ECHO MV E/E' LATERAL: 15.77
ECHO MV E/E' RATIO (AVERAGED): 13.81
ECHO MV E/E' SEPTAL: 11.85
ECHO MV REGURGITANT PEAK GRADIENT: 85 MMHG
ECHO MV REGURGITANT PEAK VELOCITY: 4.6 M/S
ECHO PV MAX VELOCITY: 1 M/S
ECHO PV PEAK GRADIENT: 4 MMHG
ECHO RV FREE WALL PEAK S': 12.7 CM/S
ECHO RV INTERNAL DIMENSION: 4 CM
ECHO RV TAPSE: 2.7 CM (ref 1.7–?)
ECHO TV REGURGITANT MAX VELOCITY: 2.64 M/S
ECHO TV REGURGITANT PEAK GRADIENT: 29 MMHG
EOSINOPHIL # BLD: 0 K/UL (ref 0–0.4)
EOSINOPHIL NFR BLD: 0 % (ref 0–7)
ERYTHROCYTE [DISTWIDTH] IN BLOOD BY AUTOMATED COUNT: 13.6 % (ref 11.5–14.5)
GLUCOSE BLD STRIP.AUTO-MCNC: 203 MG/DL (ref 65–117)
GLUCOSE BLD STRIP.AUTO-MCNC: 224 MG/DL (ref 65–117)
GLUCOSE BLD STRIP.AUTO-MCNC: 262 MG/DL (ref 65–117)
GLUCOSE BLD STRIP.AUTO-MCNC: 265 MG/DL (ref 65–117)
GLUCOSE SERPL-MCNC: 284 MG/DL (ref 65–100)
HCT VFR BLD AUTO: 37.2 % (ref 35–47)
HGB BLD-MCNC: 12.1 G/DL (ref 11.5–16)
IMM GRANULOCYTES # BLD AUTO: 0 K/UL (ref 0–0.04)
IMM GRANULOCYTES NFR BLD AUTO: 1 % (ref 0–0.5)
LYMPHOCYTES # BLD: 1 K/UL (ref 0.8–3.5)
LYMPHOCYTES NFR BLD: 12 % (ref 12–49)
MCH RBC QN AUTO: 32.1 PG (ref 26–34)
MCHC RBC AUTO-ENTMCNC: 32.5 G/DL (ref 30–36.5)
MCV RBC AUTO: 98.7 FL (ref 80–99)
MONOCYTES # BLD: 0.5 K/UL (ref 0–1)
MONOCYTES NFR BLD: 6 % (ref 5–13)
NEUTS SEG # BLD: 6.8 K/UL (ref 1.8–8)
NEUTS SEG NFR BLD: 81 % (ref 32–75)
NRBC # BLD: 0 K/UL (ref 0–0.01)
NRBC BLD-RTO: 0 PER 100 WBC
PLATELET # BLD AUTO: 160 K/UL (ref 150–400)
PMV BLD AUTO: 10.5 FL (ref 8.9–12.9)
POTASSIUM SERPL-SCNC: 3.8 MMOL/L (ref 3.5–5.1)
RBC # BLD AUTO: 3.77 M/UL (ref 3.8–5.2)
SERVICE CMNT-IMP: ABNORMAL
SODIUM SERPL-SCNC: 143 MMOL/L (ref 136–145)
T4 FREE SERPL-MCNC: 0.9 NG/DL (ref 0.8–1.5)
TSH SERPL DL<=0.05 MIU/L-ACNC: 0.27 UIU/ML (ref 0.36–3.74)
WBC # BLD AUTO: 8.3 K/UL (ref 3.6–11)

## 2024-11-23 PROCEDURE — 99223 1ST HOSP IP/OBS HIGH 75: CPT | Performed by: INTERNAL MEDICINE

## 2024-11-23 PROCEDURE — 86618 LYME DISEASE ANTIBODY: CPT

## 2024-11-23 PROCEDURE — 6370000000 HC RX 637 (ALT 250 FOR IP): Performed by: STUDENT IN AN ORGANIZED HEALTH CARE EDUCATION/TRAINING PROGRAM

## 2024-11-23 PROCEDURE — 6370000000 HC RX 637 (ALT 250 FOR IP): Performed by: HOSPITALIST

## 2024-11-23 PROCEDURE — 2060000000 HC ICU INTERMEDIATE R&B

## 2024-11-23 PROCEDURE — C8929 TTE W OR WO FOL WCON,DOPPLER: HCPCS

## 2024-11-23 PROCEDURE — 6360000004 HC RX CONTRAST MEDICATION: Performed by: INTERNAL MEDICINE

## 2024-11-23 PROCEDURE — 36415 COLL VENOUS BLD VENIPUNCTURE: CPT

## 2024-11-23 PROCEDURE — 6360000002 HC RX W HCPCS: Performed by: NURSE PRACTITIONER

## 2024-11-23 PROCEDURE — 80048 BASIC METABOLIC PNL TOTAL CA: CPT

## 2024-11-23 PROCEDURE — 82962 GLUCOSE BLOOD TEST: CPT

## 2024-11-23 PROCEDURE — 93005 ELECTROCARDIOGRAM TRACING: CPT | Performed by: STUDENT IN AN ORGANIZED HEALTH CARE EDUCATION/TRAINING PROGRAM

## 2024-11-23 PROCEDURE — 93306 TTE W/DOPPLER COMPLETE: CPT | Performed by: INTERNAL MEDICINE

## 2024-11-23 PROCEDURE — 85025 COMPLETE CBC W/AUTO DIFF WBC: CPT

## 2024-11-23 PROCEDURE — 6370000000 HC RX 637 (ALT 250 FOR IP): Performed by: NURSE PRACTITIONER

## 2024-11-23 RX ORDER — HYDRALAZINE HYDROCHLORIDE 20 MG/ML
10 INJECTION INTRAMUSCULAR; INTRAVENOUS ONCE
Status: COMPLETED | OUTPATIENT
Start: 2024-11-23 | End: 2024-11-23

## 2024-11-23 RX ORDER — LISINOPRIL 5 MG/1
10 TABLET ORAL DAILY
Status: DISCONTINUED | OUTPATIENT
Start: 2024-11-24 | End: 2024-11-25

## 2024-11-23 RX ORDER — AMLODIPINE BESYLATE 5 MG/1
10 TABLET ORAL DAILY
Status: DISCONTINUED | OUTPATIENT
Start: 2024-11-23 | End: 2024-11-23

## 2024-11-23 RX ORDER — INSULIN LISPRO 100 [IU]/ML
0-4 INJECTION, SOLUTION INTRAVENOUS; SUBCUTANEOUS
Status: DISCONTINUED | OUTPATIENT
Start: 2024-11-23 | End: 2024-11-23

## 2024-11-23 RX ORDER — GABAPENTIN 300 MG/1
300 CAPSULE ORAL NIGHTLY
Status: DISCONTINUED | OUTPATIENT
Start: 2024-11-23 | End: 2024-11-26 | Stop reason: HOSPADM

## 2024-11-23 RX ORDER — INSULIN GLARGINE 100 [IU]/ML
30 INJECTION, SOLUTION SUBCUTANEOUS NIGHTLY
Status: DISCONTINUED | OUTPATIENT
Start: 2024-11-24 | End: 2024-11-25

## 2024-11-23 RX ORDER — DEXTROSE MONOHYDRATE 100 MG/ML
INJECTION, SOLUTION INTRAVENOUS CONTINUOUS PRN
Status: DISCONTINUED | OUTPATIENT
Start: 2024-11-23 | End: 2024-11-26 | Stop reason: HOSPADM

## 2024-11-23 RX ADMIN — ROSUVASTATIN CALCIUM 20 MG: 10 TABLET, FILM COATED ORAL at 20:37

## 2024-11-23 RX ADMIN — PRIMIDONE 50 MG: 50 TABLET ORAL at 09:59

## 2024-11-23 RX ADMIN — GABAPENTIN 300 MG: 300 CAPSULE ORAL at 20:37

## 2024-11-23 RX ADMIN — PANTOPRAZOLE SODIUM 40 MG: 40 TABLET, DELAYED RELEASE ORAL at 06:45

## 2024-11-23 RX ADMIN — INSULIN LISPRO 2 UNITS: 100 INJECTION, SOLUTION INTRAVENOUS; SUBCUTANEOUS at 12:06

## 2024-11-23 RX ADMIN — BUMETANIDE 1 MG: 1 TABLET ORAL at 08:40

## 2024-11-23 RX ADMIN — AMLODIPINE BESYLATE 10 MG: 5 TABLET ORAL at 08:39

## 2024-11-23 RX ADMIN — PREDNISONE 30 MG: 20 TABLET ORAL at 08:39

## 2024-11-23 RX ADMIN — EZETIMIBE 10 MG: 10 TABLET ORAL at 20:37

## 2024-11-23 RX ADMIN — HYDRALAZINE HYDROCHLORIDE 10 MG: 20 INJECTION INTRAMUSCULAR; INTRAVENOUS at 04:11

## 2024-11-23 RX ADMIN — DICLOFENAC SODIUM 2 G: 10 GEL TOPICAL at 00:10

## 2024-11-23 RX ADMIN — ACETAMINOPHEN 1000 MG: 500 TABLET ORAL at 20:37

## 2024-11-23 RX ADMIN — DICLOFENAC SODIUM 2 G: 10 GEL TOPICAL at 16:24

## 2024-11-23 RX ADMIN — INSULIN HUMAN 55 UNITS: 100 INJECTION, SUSPENSION SUBCUTANEOUS at 09:59

## 2024-11-23 RX ADMIN — MONTELUKAST 10 MG: 10 TABLET, FILM COATED ORAL at 20:37

## 2024-11-23 RX ADMIN — DOXYCYCLINE HYCLATE 100 MG: 100 TABLET, COATED ORAL at 08:40

## 2024-11-23 RX ADMIN — DOXYCYCLINE HYCLATE 100 MG: 100 TABLET, COATED ORAL at 20:37

## 2024-11-23 RX ADMIN — Medication 400 MG: at 00:10

## 2024-11-23 RX ADMIN — DICLOFENAC SODIUM 2 G: 10 GEL TOPICAL at 13:34

## 2024-11-23 RX ADMIN — DICLOFENAC SODIUM 2 G: 10 GEL TOPICAL at 21:09

## 2024-11-23 RX ADMIN — INSULIN LISPRO 4 UNITS: 100 INJECTION, SOLUTION INTRAVENOUS; SUBCUTANEOUS at 16:41

## 2024-11-23 RX ADMIN — PRIMIDONE 50 MG: 50 TABLET ORAL at 20:37

## 2024-11-23 RX ADMIN — PERFLUTREN 1.5 ML: 6.52 INJECTION, SUSPENSION INTRAVENOUS at 10:03

## 2024-11-23 RX ADMIN — POTASSIUM BICARBONATE 40 MEQ: 782 TABLET, EFFERVESCENT ORAL at 14:32

## 2024-11-23 RX ADMIN — INSULIN LISPRO 4 UNITS: 100 INJECTION, SOLUTION INTRAVENOUS; SUBCUTANEOUS at 21:08

## 2024-11-23 RX ADMIN — DICLOFENAC SODIUM 2 G: 10 GEL TOPICAL at 08:45

## 2024-11-23 RX ADMIN — GABAPENTIN 100 MG: 100 CAPSULE ORAL at 08:40

## 2024-11-23 ASSESSMENT — PAIN DESCRIPTION - LOCATION
LOCATION: LEG;FOOT
LOCATION: LEG;KNEE

## 2024-11-23 ASSESSMENT — PAIN DESCRIPTION - PAIN TYPE
TYPE: CHRONIC PAIN
TYPE: NEUROPATHIC PAIN

## 2024-11-23 ASSESSMENT — PAIN DESCRIPTION - DESCRIPTORS: DESCRIPTORS: ACHING;DISCOMFORT;TINGLING

## 2024-11-23 ASSESSMENT — PAIN SCALES - GENERAL
PAINLEVEL_OUTOF10: 0
PAINLEVEL_OUTOF10: 7
PAINLEVEL_OUTOF10: 5
PAINLEVEL_OUTOF10: 6
PAINLEVEL_OUTOF10: 0

## 2024-11-23 ASSESSMENT — PAIN DESCRIPTION - ONSET: ONSET: ON-GOING

## 2024-11-23 ASSESSMENT — PAIN DESCRIPTION - FREQUENCY: FREQUENCY: CONTINUOUS

## 2024-11-23 ASSESSMENT — PAIN - FUNCTIONAL ASSESSMENT: PAIN_FUNCTIONAL_ASSESSMENT: PREVENTS OR INTERFERES SOME ACTIVE ACTIVITIES AND ADLS

## 2024-11-23 ASSESSMENT — PAIN DESCRIPTION - ORIENTATION
ORIENTATION: RIGHT;LEFT
ORIENTATION: RIGHT;LEFT

## 2024-11-23 NOTE — PLAN OF CARE
Problem: Chronic Conditions and Co-morbidities  Goal: Patient's chronic conditions and co-morbidity symptoms are monitored and maintained or improved  Outcome: Progressing  Flowsheets (Taken 11/23/2024 0223)  Care Plan - Patient's Chronic Conditions and Co-Morbidity Symptoms are Monitored and Maintained or Improved:   Monitor and assess patient's chronic conditions and comorbid symptoms for stability, deterioration, or improvement   Collaborate with multidisciplinary team to address chronic and comorbid conditions and prevent exacerbation or deterioration   Update acute care plan with appropriate goals if chronic or comorbid symptoms are exacerbated and prevent overall improvement and discharge     Problem: Discharge Planning  Goal: Discharge to home or other facility with appropriate resources  Outcome: Progressing  Flowsheets (Taken 11/23/2024 0223)  Discharge to home or other facility with appropriate resources:   Identify barriers to discharge with patient and caregiver   Arrange for needed discharge resources and transportation as appropriate   Identify discharge learning needs (meds, wound care, etc)   Refer to discharge planning if patient needs post-hospital services based on physician order or complex needs related to functional status, cognitive ability or social support system     Problem: Pain  Goal: Verbalizes/displays adequate comfort level or baseline comfort level  Outcome: Progressing  Flowsheets (Taken 11/23/2024 0223)  Verbalizes/displays adequate comfort level or baseline comfort level:   Encourage patient to monitor pain and request assistance   Assess pain using appropriate pain scale   Administer analgesics based on type and severity of pain and evaluate response   Implement non-pharmacological measures as appropriate and evaluate response   Notify Licensed Independent Practitioner if interventions unsuccessful or patient reports new pain     Problem: ABCDS Injury Assessment  Goal: Absence

## 2024-11-23 NOTE — H&P
Insulin NPH and Lantus holding NPH and using SSI with Lantus .        MD GEOVANI FRAZIER/AQS  D:  11/22/2024 22:06:51  T:  11/23/2024 00:42:07  JOB #:  [id number]

## 2024-11-23 NOTE — ED NOTES
SBAR report given to YUDELKA Skinner to ICU for patient's routine progression of care.    Loud volume/Pressured rate

## 2024-11-23 NOTE — ED TRIAGE NOTES
Pt arrives to ED via EMS from [facility] with complaints of bradycardia x1 week.  Pt seen at her facility and taken off her lisinopril.  Pt still bradycardic and reports worsening SOB.  Pt denies any CP, NVD.  Pt has hx of cystic fibrosis since 2019, and is SOB at baseline, but reports \"its harder to get around\".  Pt uses a wheel chair at baseline.  Pt denies cardiac hx other than HTN.

## 2024-11-23 NOTE — ED NOTES
This RN notified provider of patient's consistent low heart rate. This RN also notified charge nurse to escalate.

## 2024-11-23 NOTE — ED NOTES
Patient had a sustained heart rate of in the 30s. Patient placed on pacer pads. Dr. Hernandez notified. Cardiology paged

## 2024-11-24 LAB
ANION GAP SERPL CALC-SCNC: 3 MMOL/L (ref 2–12)
BASOPHILS # BLD: 0 K/UL (ref 0–0.1)
BASOPHILS NFR BLD: 0 % (ref 0–1)
BUN SERPL-MCNC: 27 MG/DL (ref 6–20)
BUN/CREAT SERPL: 44 (ref 12–20)
CALCIUM SERPL-MCNC: 9.1 MG/DL (ref 8.5–10.1)
CHLORIDE SERPL-SCNC: 106 MMOL/L (ref 97–108)
CO2 SERPL-SCNC: 32 MMOL/L (ref 21–32)
CREAT SERPL-MCNC: 0.62 MG/DL (ref 0.55–1.02)
DIFFERENTIAL METHOD BLD: NORMAL
EOSINOPHIL # BLD: 0.1 K/UL (ref 0–0.4)
EOSINOPHIL NFR BLD: 1 % (ref 0–7)
ERYTHROCYTE [DISTWIDTH] IN BLOOD BY AUTOMATED COUNT: 13.4 % (ref 11.5–14.5)
GLUCOSE BLD STRIP.AUTO-MCNC: 122 MG/DL (ref 65–117)
GLUCOSE BLD STRIP.AUTO-MCNC: 185 MG/DL (ref 65–117)
GLUCOSE BLD STRIP.AUTO-MCNC: 193 MG/DL (ref 65–117)
GLUCOSE BLD STRIP.AUTO-MCNC: 336 MG/DL (ref 65–117)
GLUCOSE SERPL-MCNC: 144 MG/DL (ref 65–100)
HCT VFR BLD AUTO: 38.8 % (ref 35–47)
HGB BLD-MCNC: 12.9 G/DL (ref 11.5–16)
IMM GRANULOCYTES # BLD AUTO: 0 K/UL (ref 0–0.04)
IMM GRANULOCYTES NFR BLD AUTO: 0 % (ref 0–0.5)
LYMPHOCYTES # BLD: 1.6 K/UL (ref 0.8–3.5)
LYMPHOCYTES NFR BLD: 20 % (ref 12–49)
MAGNESIUM SERPL-MCNC: 2 MG/DL (ref 1.6–2.4)
MCH RBC QN AUTO: 32.5 PG (ref 26–34)
MCHC RBC AUTO-ENTMCNC: 33.2 G/DL (ref 30–36.5)
MCV RBC AUTO: 97.7 FL (ref 80–99)
MONOCYTES # BLD: 0.6 K/UL (ref 0–1)
MONOCYTES NFR BLD: 7 % (ref 5–13)
NEUTS SEG # BLD: 5.6 K/UL (ref 1.8–8)
NEUTS SEG NFR BLD: 72 % (ref 32–75)
NRBC # BLD: 0 K/UL (ref 0–0.01)
NRBC BLD-RTO: 0 PER 100 WBC
PLATELET # BLD AUTO: 162 K/UL (ref 150–400)
PMV BLD AUTO: 10.2 FL (ref 8.9–12.9)
POTASSIUM SERPL-SCNC: 3.7 MMOL/L (ref 3.5–5.1)
RBC # BLD AUTO: 3.97 M/UL (ref 3.8–5.2)
SERVICE CMNT-IMP: ABNORMAL
SODIUM SERPL-SCNC: 141 MMOL/L (ref 136–145)
WBC # BLD AUTO: 7.9 K/UL (ref 3.6–11)

## 2024-11-24 PROCEDURE — 36415 COLL VENOUS BLD VENIPUNCTURE: CPT

## 2024-11-24 PROCEDURE — 83735 ASSAY OF MAGNESIUM: CPT

## 2024-11-24 PROCEDURE — 6360000002 HC RX W HCPCS: Performed by: HOSPITALIST

## 2024-11-24 PROCEDURE — 80048 BASIC METABOLIC PNL TOTAL CA: CPT

## 2024-11-24 PROCEDURE — 85025 COMPLETE CBC W/AUTO DIFF WBC: CPT

## 2024-11-24 PROCEDURE — 6370000000 HC RX 637 (ALT 250 FOR IP): Performed by: HOSPITALIST

## 2024-11-24 PROCEDURE — 99233 SBSQ HOSP IP/OBS HIGH 50: CPT | Performed by: INTERNAL MEDICINE

## 2024-11-24 PROCEDURE — 2060000000 HC ICU INTERMEDIATE R&B

## 2024-11-24 PROCEDURE — 6370000000 HC RX 637 (ALT 250 FOR IP): Performed by: STUDENT IN AN ORGANIZED HEALTH CARE EDUCATION/TRAINING PROGRAM

## 2024-11-24 PROCEDURE — 6370000000 HC RX 637 (ALT 250 FOR IP): Performed by: NURSE PRACTITIONER

## 2024-11-24 PROCEDURE — 82962 GLUCOSE BLOOD TEST: CPT

## 2024-11-24 RX ADMIN — PANTOPRAZOLE SODIUM 40 MG: 40 TABLET, DELAYED RELEASE ORAL at 06:44

## 2024-11-24 RX ADMIN — ENOXAPARIN SODIUM 40 MG: 100 INJECTION SUBCUTANEOUS at 08:46

## 2024-11-24 RX ADMIN — DICLOFENAC SODIUM 2 G: 10 GEL TOPICAL at 20:52

## 2024-11-24 RX ADMIN — POTASSIUM BICARBONATE 40 MEQ: 782 TABLET, EFFERVESCENT ORAL at 13:59

## 2024-11-24 RX ADMIN — PRIMIDONE 50 MG: 50 TABLET ORAL at 20:52

## 2024-11-24 RX ADMIN — INSULIN LISPRO 2 UNITS: 100 INJECTION, SOLUTION INTRAVENOUS; SUBCUTANEOUS at 11:19

## 2024-11-24 RX ADMIN — ACETAMINOPHEN 1000 MG: 500 TABLET ORAL at 19:47

## 2024-11-24 RX ADMIN — DOXYCYCLINE HYCLATE 100 MG: 100 TABLET, COATED ORAL at 20:52

## 2024-11-24 RX ADMIN — INSULIN GLARGINE 30 UNITS: 100 INJECTION, SOLUTION SUBCUTANEOUS at 20:51

## 2024-11-24 RX ADMIN — DICLOFENAC SODIUM 2 G: 10 GEL TOPICAL at 13:18

## 2024-11-24 RX ADMIN — EZETIMIBE 10 MG: 10 TABLET ORAL at 20:51

## 2024-11-24 RX ADMIN — PRIMIDONE 50 MG: 50 TABLET ORAL at 08:46

## 2024-11-24 RX ADMIN — DOXYCYCLINE HYCLATE 100 MG: 100 TABLET, COATED ORAL at 08:46

## 2024-11-24 RX ADMIN — INSULIN HUMAN 55 UNITS: 100 INJECTION, SUSPENSION SUBCUTANEOUS at 08:46

## 2024-11-24 RX ADMIN — LISINOPRIL 10 MG: 5 TABLET ORAL at 08:45

## 2024-11-24 RX ADMIN — MONTELUKAST 10 MG: 10 TABLET, FILM COATED ORAL at 20:52

## 2024-11-24 RX ADMIN — DICLOFENAC SODIUM 2 G: 10 GEL TOPICAL at 08:43

## 2024-11-24 RX ADMIN — INSULIN LISPRO 2 UNITS: 100 INJECTION, SOLUTION INTRAVENOUS; SUBCUTANEOUS at 16:37

## 2024-11-24 RX ADMIN — INSULIN LISPRO 6 UNITS: 100 INJECTION, SOLUTION INTRAVENOUS; SUBCUTANEOUS at 20:50

## 2024-11-24 RX ADMIN — DICLOFENAC SODIUM 2 G: 10 GEL TOPICAL at 16:37

## 2024-11-24 RX ADMIN — BUMETANIDE 1 MG: 1 TABLET ORAL at 08:46

## 2024-11-24 RX ADMIN — ROSUVASTATIN CALCIUM 20 MG: 10 TABLET, FILM COATED ORAL at 20:52

## 2024-11-24 RX ADMIN — GABAPENTIN 300 MG: 300 CAPSULE ORAL at 20:51

## 2024-11-24 RX ADMIN — PREDNISONE 30 MG: 20 TABLET ORAL at 08:45

## 2024-11-24 ASSESSMENT — PAIN DESCRIPTION - ORIENTATION
ORIENTATION: RIGHT;LEFT;POSTERIOR
ORIENTATION: LEFT;RIGHT
ORIENTATION: RIGHT;LEFT;POSTERIOR
ORIENTATION: RIGHT;LEFT

## 2024-11-24 ASSESSMENT — PAIN DESCRIPTION - LOCATION
LOCATION: LEG
LOCATION: BACK;LEG
LOCATION: BACK;LEG

## 2024-11-24 ASSESSMENT — PAIN SCALES - GENERAL
PAINLEVEL_OUTOF10: 5
PAINLEVEL_OUTOF10: 0
PAINLEVEL_OUTOF10: 3
PAINLEVEL_OUTOF10: 5
PAINLEVEL_OUTOF10: 6
PAINLEVEL_OUTOF10: 3
PAINLEVEL_OUTOF10: 0
PAINLEVEL_OUTOF10: 0
PAINLEVEL_OUTOF10: 4

## 2024-11-24 ASSESSMENT — PAIN DESCRIPTION - DESCRIPTORS
DESCRIPTORS: ACHING;DISCOMFORT
DESCRIPTORS: ACHING
DESCRIPTORS: ACHING
DESCRIPTORS: ACHING;DISCOMFORT
DESCRIPTORS: ACHING;DISCOMFORT

## 2024-11-24 ASSESSMENT — PAIN DESCRIPTION - FREQUENCY
FREQUENCY: CONTINUOUS

## 2024-11-24 ASSESSMENT — PAIN DESCRIPTION - PAIN TYPE
TYPE: NEUROPATHIC PAIN

## 2024-11-24 NOTE — PROGRESS NOTES
Problem: Pressure Injury - Risk of  Goal: *Prevention of pressure injury  Document Hari Scale and appropriate interventions in the flowsheet. Outcome: Progressing Towards Goal  Pressure Injury Interventions: Activity Interventions: Pressure redistribution bed/mattress(bed type)    Mobility Interventions: Turn and reposition approx.  every two hours(pillow and wedges)    Nutrition Interventions: Document food/fluid/supplement intake The clinical goals for the shift include Patient's neurovascular checks will remain WDL through 0700 on .    Patient afebrile, AVSS. Pain well-controlled without the use of PRN pain medication. Tolerating regular diet. Midline back incision covered with spongy dressing, CDI. Wound vac @ -125, output of 10 mL during shift. Neurovascular check WDL. Bisacodyl held per patient request d/t multiple episodes of diarrhea. Keflex course continued. Mom at bedside. No questions or concerns at this time.    Problem: Pain - Pediatric  Goal: Verbalizes/displays adequate comfort level or baseline comfort level  Outcome: Progressing     Problem: Thermoregulation - [place]/Pediatrics  Goal: Maintains normal body temperature  Outcome: Progressing     Problem: Safety Pediatric - Fall  Goal: Free from fall injury  Outcome: Progressing     Problem: Discharge Planning  Goal: Discharge to home or other facility with appropriate resources  Outcome: Progressing     Problem: Chronic Conditions and Co-morbidities  Goal: Patient's chronic conditions and co-morbidity symptoms are monitored and maintained or improved  Outcome: Progressing     Problem: Meds/Post-op Pain  Goal: Pain controlled to tolerate pain level  Outcome: Progressing  Goal: Tolerates prescribed medication  Outcome: Progressing     Problem: DVT/VTE Prevention/Activity  Goal: No decrease in circulation/sensation  Outcome: Progressing  Goal: Prevent skin breakdown  Outcome: Progressing  Goal: Return to preop oxygenation status  Outcome: Progressing  Goal: Tolerates optimal activity  Outcome: Progressing  Goal: Increase self care and/or family involvement in 24 hrs.  Outcome: Progressing     Problem: Wound care/infection prevention  Goal: No signs of infection in 24 hrs.  Outcome: Progressing  Goal: No unexpected bleeding from incision this shift  Outcome: Progressing     Problem: Diet/fluid balance  Goal: Adequate urinary output  Outcome: Progressing  Goal: Free from  nausea/vomiting  Outcome: Progressing  Goal: Return in bowel function  Outcome: Progressing  Goal: Tolerates prescribed diet  Outcome: Progressing     Problem: Other goals  Goal: No change in neurological status  Outcome: Progressing  Goal: Stabilize vital signs (return to 10% of baseline)  Outcome: Progressing

## 2024-11-25 ENCOUNTER — APPOINTMENT (OUTPATIENT)
Facility: HOSPITAL | Age: 79
DRG: 243 | End: 2024-11-25
Payer: MEDICARE

## 2024-11-25 PROBLEM — I44.1 AV BLOCK, 2ND DEGREE: Status: ACTIVE | Noted: 2024-11-25

## 2024-11-25 PROBLEM — I10 PRIMARY HYPERTENSION: Status: ACTIVE | Noted: 2017-08-24

## 2024-11-25 LAB
ANION GAP SERPL CALC-SCNC: 4 MMOL/L (ref 2–12)
BUN SERPL-MCNC: 27 MG/DL (ref 6–20)
BUN/CREAT SERPL: 48 (ref 12–20)
CALCIUM SERPL-MCNC: 8.6 MG/DL (ref 8.5–10.1)
CHLORIDE SERPL-SCNC: 105 MMOL/L (ref 97–108)
CO2 SERPL-SCNC: 33 MMOL/L (ref 21–32)
CREAT SERPL-MCNC: 0.56 MG/DL (ref 0.55–1.02)
ECHO BSA: 1.92 M2
EKG ATRIAL RATE: 38 BPM
EKG ATRIAL RATE: 38 BPM
EKG ATRIAL RATE: 40 BPM
EKG ATRIAL RATE: 48 BPM
EKG DIAGNOSIS: NORMAL
EKG P AXIS: -24 DEGREES
EKG P AXIS: 20 DEGREES
EKG P AXIS: 29 DEGREES
EKG P AXIS: 76 DEGREES
EKG P-R INTERVAL: 182 MS
EKG P-R INTERVAL: 216 MS
EKG P-R INTERVAL: 252 MS
EKG P-R INTERVAL: 258 MS
EKG Q-T INTERVAL: 588 MS
EKG Q-T INTERVAL: 616 MS
EKG Q-T INTERVAL: 650 MS
EKG Q-T INTERVAL: 664 MS
EKG QRS DURATION: 120 MS
EKG QRS DURATION: 126 MS
EKG QRS DURATION: 132 MS
EKG QRS DURATION: 148 MS
EKG QTC CALCULATION (BAZETT): 479 MS
EKG QTC CALCULATION (BAZETT): 516 MS
EKG QTC CALCULATION (BAZETT): 527 MS
EKG QTC CALCULATION (BAZETT): 550 MS
EKG R AXIS: -32 DEGREES
EKG R AXIS: -57 DEGREES
EKG R AXIS: 103 DEGREES
EKG R AXIS: 94 DEGREES
EKG T AXIS: -33 DEGREES
EKG T AXIS: -5 DEGREES
EKG T AXIS: 142 DEGREES
EKG T AXIS: 88 DEGREES
EKG VENTRICULAR RATE: 38 BPM
EKG VENTRICULAR RATE: 38 BPM
EKG VENTRICULAR RATE: 40 BPM
EKG VENTRICULAR RATE: 48 BPM
GLUCOSE BLD STRIP.AUTO-MCNC: 114 MG/DL (ref 65–117)
GLUCOSE BLD STRIP.AUTO-MCNC: 73 MG/DL (ref 65–117)
GLUCOSE BLD STRIP.AUTO-MCNC: 90 MG/DL (ref 65–117)
GLUCOSE BLD STRIP.AUTO-MCNC: 95 MG/DL (ref 65–117)
GLUCOSE BLD STRIP.AUTO-MCNC: 96 MG/DL (ref 65–117)
GLUCOSE SERPL-MCNC: 120 MG/DL (ref 65–100)
LYME ANTIBODY: NEGATIVE
MAGNESIUM SERPL-MCNC: 1.9 MG/DL (ref 1.6–2.4)
POTASSIUM SERPL-SCNC: 3.6 MMOL/L (ref 3.5–5.1)
SERVICE CMNT-IMP: NORMAL
SODIUM SERPL-SCNC: 142 MMOL/L (ref 136–145)
T3FREE SERPL-MCNC: 1.7 PG/ML (ref 2.2–4)
T4 FREE SERPL-MCNC: 0.9 NG/DL (ref 0.8–1.5)

## 2024-11-25 PROCEDURE — 93010 ELECTROCARDIOGRAM REPORT: CPT | Performed by: SPECIALIST

## 2024-11-25 PROCEDURE — 02HK3JZ INSERTION OF PACEMAKER LEAD INTO RIGHT VENTRICLE, PERCUTANEOUS APPROACH: ICD-10-PCS | Performed by: INTERNAL MEDICINE

## 2024-11-25 PROCEDURE — 84481 FREE ASSAY (FT-3): CPT

## 2024-11-25 PROCEDURE — 2580000003 HC RX 258: Performed by: INTERNAL MEDICINE

## 2024-11-25 PROCEDURE — 2709999900 HC NON-CHARGEABLE SUPPLY: Performed by: INTERNAL MEDICINE

## 2024-11-25 PROCEDURE — 93010 ELECTROCARDIOGRAM REPORT: CPT | Performed by: INTERNAL MEDICINE

## 2024-11-25 PROCEDURE — 99223 1ST HOSP IP/OBS HIGH 75: CPT | Performed by: INTERNAL MEDICINE

## 2024-11-25 PROCEDURE — 71045 X-RAY EXAM CHEST 1 VIEW: CPT

## 2024-11-25 PROCEDURE — 99153 MOD SED SAME PHYS/QHP EA: CPT | Performed by: INTERNAL MEDICINE

## 2024-11-25 PROCEDURE — C1898 LEAD, PMKR, OTHER THAN TRANS: HCPCS | Performed by: INTERNAL MEDICINE

## 2024-11-25 PROCEDURE — 76937 US GUIDE VASCULAR ACCESS: CPT | Performed by: INTERNAL MEDICINE

## 2024-11-25 PROCEDURE — 6370000000 HC RX 637 (ALT 250 FOR IP): Performed by: HOSPITALIST

## 2024-11-25 PROCEDURE — 02H63JZ INSERTION OF PACEMAKER LEAD INTO RIGHT ATRIUM, PERCUTANEOUS APPROACH: ICD-10-PCS | Performed by: INTERNAL MEDICINE

## 2024-11-25 PROCEDURE — C1769 GUIDE WIRE: HCPCS | Performed by: INTERNAL MEDICINE

## 2024-11-25 PROCEDURE — 33208 INSRT HEART PM ATRIAL & VENT: CPT | Performed by: INTERNAL MEDICINE

## 2024-11-25 PROCEDURE — C1894 INTRO/SHEATH, NON-LASER: HCPCS | Performed by: INTERNAL MEDICINE

## 2024-11-25 PROCEDURE — 36415 COLL VENOUS BLD VENIPUNCTURE: CPT

## 2024-11-25 PROCEDURE — 2060000000 HC ICU INTERMEDIATE R&B

## 2024-11-25 PROCEDURE — 6360000002 HC RX W HCPCS: Performed by: INTERNAL MEDICINE

## 2024-11-25 PROCEDURE — C1785 PMKR, DUAL, RATE-RESP: HCPCS | Performed by: INTERNAL MEDICINE

## 2024-11-25 PROCEDURE — 2500000003 HC RX 250 WO HCPCS: Performed by: INTERNAL MEDICINE

## 2024-11-25 PROCEDURE — C1892 INTRO/SHEATH,FIXED,PEEL-AWAY: HCPCS | Performed by: INTERNAL MEDICINE

## 2024-11-25 PROCEDURE — 99152 MOD SED SAME PHYS/QHP 5/>YRS: CPT | Performed by: INTERNAL MEDICINE

## 2024-11-25 PROCEDURE — 84439 ASSAY OF FREE THYROXINE: CPT

## 2024-11-25 PROCEDURE — 6370000000 HC RX 637 (ALT 250 FOR IP): Performed by: INTERNAL MEDICINE

## 2024-11-25 PROCEDURE — 80048 BASIC METABOLIC PNL TOTAL CA: CPT

## 2024-11-25 PROCEDURE — 6370000000 HC RX 637 (ALT 250 FOR IP): Performed by: NURSE PRACTITIONER

## 2024-11-25 PROCEDURE — 82962 GLUCOSE BLOOD TEST: CPT

## 2024-11-25 PROCEDURE — 6360000002 HC RX W HCPCS: Performed by: STUDENT IN AN ORGANIZED HEALTH CARE EDUCATION/TRAINING PROGRAM

## 2024-11-25 PROCEDURE — 83735 ASSAY OF MAGNESIUM: CPT

## 2024-11-25 PROCEDURE — 6370000000 HC RX 637 (ALT 250 FOR IP)

## 2024-11-25 PROCEDURE — 0JH606Z INSERTION OF PACEMAKER, DUAL CHAMBER INTO CHEST SUBCUTANEOUS TISSUE AND FASCIA, OPEN APPROACH: ICD-10-PCS | Performed by: INTERNAL MEDICINE

## 2024-11-25 DEVICE — IPG W1DR01 AZURE XT DR MRI USA
Type: IMPLANTABLE DEVICE | Status: FUNCTIONAL
Brand: AZURE™ XT DR MRI SURESCAN™

## 2024-11-25 DEVICE — LEAD 3830 US MKT/ 69CM MRI LBBAP
Type: IMPLANTABLE DEVICE | Status: FUNCTIONAL
Brand: SELECTSECURE™ MRI SURESCAN™

## 2024-11-25 DEVICE — LEAD 5076-52 MRI US RCMCRD
Type: IMPLANTABLE DEVICE | Status: FUNCTIONAL
Brand: CAPSUREFIX NOVUS MRI™ SURESCAN®

## 2024-11-25 RX ORDER — INSULIN GLARGINE 100 [IU]/ML
24 INJECTION, SOLUTION SUBCUTANEOUS NIGHTLY
Status: DISCONTINUED | OUTPATIENT
Start: 2024-11-25 | End: 2024-11-26 | Stop reason: HOSPADM

## 2024-11-25 RX ORDER — MIDAZOLAM HYDROCHLORIDE 1 MG/ML
INJECTION, SOLUTION INTRAMUSCULAR; INTRAVENOUS PRN
Status: DISCONTINUED | OUTPATIENT
Start: 2024-11-25 | End: 2024-11-25 | Stop reason: HOSPADM

## 2024-11-25 RX ORDER — ONDANSETRON 2 MG/ML
4 INJECTION INTRAMUSCULAR; INTRAVENOUS EVERY 6 HOURS PRN
Status: DISCONTINUED | OUTPATIENT
Start: 2024-11-25 | End: 2024-11-26 | Stop reason: HOSPADM

## 2024-11-25 RX ORDER — BUPIVACAINE HYDROCHLORIDE 2.5 MG/ML
INJECTION, SOLUTION EPIDURAL; INFILTRATION; INTRACAUDAL PRN
Status: DISCONTINUED | OUTPATIENT
Start: 2024-11-25 | End: 2024-11-25 | Stop reason: HOSPADM

## 2024-11-25 RX ORDER — HYDRALAZINE HYDROCHLORIDE 20 MG/ML
5 INJECTION INTRAMUSCULAR; INTRAVENOUS EVERY 6 HOURS PRN
Status: DISCONTINUED | OUTPATIENT
Start: 2024-11-25 | End: 2024-11-26 | Stop reason: HOSPADM

## 2024-11-25 RX ORDER — FENTANYL CITRATE 50 UG/ML
INJECTION, SOLUTION INTRAMUSCULAR; INTRAVENOUS PRN
Status: DISCONTINUED | OUTPATIENT
Start: 2024-11-25 | End: 2024-11-25 | Stop reason: HOSPADM

## 2024-11-25 RX ORDER — CARVEDILOL 6.25 MG/1
6.25 TABLET ORAL 2 TIMES DAILY WITH MEALS
Status: DISCONTINUED | OUTPATIENT
Start: 2024-11-25 | End: 2024-11-26 | Stop reason: HOSPADM

## 2024-11-25 RX ORDER — MAGNESIUM SULFATE 1 G/100ML
1000 INJECTION INTRAVENOUS ONCE
Status: COMPLETED | OUTPATIENT
Start: 2024-11-25 | End: 2024-11-25

## 2024-11-25 RX ORDER — LISINOPRIL 20 MG/1
20 TABLET ORAL DAILY
Status: DISCONTINUED | OUTPATIENT
Start: 2024-11-25 | End: 2024-11-26

## 2024-11-25 RX ADMIN — GABAPENTIN 300 MG: 300 CAPSULE ORAL at 22:02

## 2024-11-25 RX ADMIN — ROSUVASTATIN CALCIUM 20 MG: 10 TABLET, FILM COATED ORAL at 22:02

## 2024-11-25 RX ADMIN — PRIMIDONE 50 MG: 50 TABLET ORAL at 22:01

## 2024-11-25 RX ADMIN — MONTELUKAST 10 MG: 10 TABLET, FILM COATED ORAL at 22:02

## 2024-11-25 RX ADMIN — EZETIMIBE 10 MG: 10 TABLET ORAL at 22:02

## 2024-11-25 RX ADMIN — INSULIN GLARGINE 24 UNITS: 100 INJECTION, SOLUTION SUBCUTANEOUS at 22:43

## 2024-11-25 RX ADMIN — MAGNESIUM SULFATE HEPTAHYDRATE 1000 MG: 1 INJECTION, SOLUTION INTRAVENOUS at 09:06

## 2024-11-25 RX ADMIN — DICLOFENAC SODIUM 2 G: 10 GEL TOPICAL at 09:07

## 2024-11-25 RX ADMIN — DICLOFENAC SODIUM 2 G: 10 GEL TOPICAL at 13:10

## 2024-11-25 RX ADMIN — DICLOFENAC SODIUM 2 G: 10 GEL TOPICAL at 22:07

## 2024-11-25 RX ADMIN — CARVEDILOL 6.25 MG: 6.25 TABLET, FILM COATED ORAL at 22:13

## 2024-11-25 ASSESSMENT — PAIN SCALES - GENERAL
PAINLEVEL_OUTOF10: 0
PAINLEVEL_OUTOF10: 2
PAINLEVEL_OUTOF10: 0
PAINLEVEL_OUTOF10: 0
PAINLEVEL_OUTOF10: 2
PAINLEVEL_OUTOF10: 0
PAINLEVEL_OUTOF10: 1

## 2024-11-25 ASSESSMENT — PAIN DESCRIPTION - ORIENTATION: ORIENTATION: RIGHT;LEFT

## 2024-11-25 ASSESSMENT — PAIN DESCRIPTION - DESCRIPTORS: DESCRIPTORS: DISCOMFORT

## 2024-11-25 ASSESSMENT — PAIN DESCRIPTION - LOCATION: LOCATION: LEG

## 2024-11-25 ASSESSMENT — PAIN - FUNCTIONAL ASSESSMENT: PAIN_FUNCTIONAL_ASSESSMENT: ACTIVITIES ARE NOT PREVENTED

## 2024-11-25 NOTE — PROCEDURES
PERMANENT LEFT-BUNDLE PACEMAKER IMPLANTATION     Procedure Date: 11/25/24  Lab Physician: Raysa Castaneda MD    INDICATIONS:  78 yo woman with a history of PMR, HTN, DM, presenting with fatigue and weakness found to have 2:1 AV block consistent with symptomatic bradycardia now referred for PPM implantation.     Comments:  After informed consent was obtained, the patient was brought to the electrophysiology laboratory in the fasting state, and was prepped and draped in the usual sterile fashion. IV antibiotic was administered prophylactically. Conscious sedation was administered by the nursing staff independent of those performing the procedure with intermittent dosing of anxiolytics and narcotics under my supervision for total sedation time for 60 mins.    Permanent Left-Bundle Pacemaker Implantation:  Local anesthetic was delivered to the left pectoral region, and an incision was made in the left deltopectoral groove. The incision was extended inward by blunt dissection. The axillary vein was accessed using a micropuncture needle with ultrasound guidance and a glide wire was then advanced to the IVC.  A 7F  guiding sheath was advanced over the retained wire. A Medtronic  His guiding sheath was advanced over a glide wire to the His position. A Medtronic 3830-69 cm His pacing catheter was advanced through the sheath. The His location was mapped to the region with a sharp His EGM. The lead was then directed 1 cm more distally. Unipolar paced morphology of \"w\" pattern with a notch at the alessandra of the QRS in lead V1 with impedance of 640 ohms.  The lead was then further screwed in 6-8 mm deeper with notch in the lead V1 noted to moved up with increased in impedance by 100-150 ohms.  Additional rotations  were performed until the unipolar pacing impedance drops by  ohms and/or local capture was obtained when pacing from the ring of the lead. Paced morphology also noted tall R in lead II, rS in lead III, and

## 2024-11-25 NOTE — CARE COORDINATION
2:41 PM  CM received a message from [facility], they should be able to accept after PT/OT evaluate. KL    11/25/24  11:43 AM        Care Management Initial Assessment  11/25/2024 11:43 AM  If patient is discharged prior to next notation, then this note serves as note for discharge by case management.    Reason for Admission:   Bradycardia [R00.1]  Symptomatic bradycardia [R00.1]  Acute on chronic congestive heart failure, unspecified heart failure type (HCC) [I50.9]  Procedure(s) (LRB):  Insert PPM dual (N/A)       Patient Admission Status: Inpatient  Date Admitted to INP: 11/22/24  RUR: Readmission Risk Score: 33.8    Hospitalization in the last 30 days (Readmission):  Yes        Advance Care Planning:  Code Status: DNR  Primary Healthcare Decision Maker: (P) Named in Scanned ACP Document  Primary Decision Maker: Jimi Verdin - Child - [phone]   Advance Directive: has an advanced directive - a copy has been provided     __________________________________________________________________________  Assessment:      11/25/24 1138   Service Assessment   Patient Orientation Alert and Oriented   Cognition Alert   History Provided By Child/Family   Primary Caregiver Other (Comment)  ([facility] staff)   Support Systems Children   Patient's Healthcare Decision Maker is: Named in Scanned ACP Document   PCP Verified by CM Yes   Last Visit to PCP Within last 3 months   Prior Functional Level Assistance with the following:;Bathing;Dressing;Toileting;Feeding;Cooking;Housework;Shopping;Mobility   Current Functional Level Bathing;Assistance with the following:;Dressing;Toileting;Feeding;Cooking;Housework;Shopping;Mobility   Can patient return to prior living arrangement Unknown at present   Ability to make needs known: Fair   Family able to assist with home care needs: No   Would you like for me to discuss the discharge plan with any other family members/significant others, and if so, who? Yes   Financial Resources Medicare

## 2024-11-25 NOTE — PLAN OF CARE
Problem: Chronic Conditions and Co-morbidities  Goal: Patient's chronic conditions and co-morbidity symptoms are monitored and maintained or improved  Outcome: Progressing  Flowsheets (Taken 11/24/2024 1945)  Care Plan - Patient's Chronic Conditions and Co-Morbidity Symptoms are Monitored and Maintained or Improved: Monitor and assess patient's chronic conditions and comorbid symptoms for stability, deterioration, or improvement     Problem: Discharge Planning  Goal: Discharge to home or other facility with appropriate resources  Outcome: Progressing  Flowsheets (Taken 11/24/2024 1945)  Discharge to home or other facility with appropriate resources: Arrange for interpreters to assist at discharge as needed     Problem: Pain  Goal: Verbalizes/displays adequate comfort level or baseline comfort level  Outcome: Progressing  Flowsheets (Taken 11/24/2024 1945)  Verbalizes/displays adequate comfort level or baseline comfort level: Assess pain using appropriate pain scale     Problem: Skin/Tissue Integrity  Goal: Absence of new skin breakdown  Description: 1.  Monitor for areas of redness and/or skin breakdown  2.  Assess vascular access sites hourly  3.  Every 4-6 hours minimum:  Change oxygen saturation probe site  4.  Every 4-6 hours:  If on nasal continuous positive airway pressure, respiratory therapy assess nares and determine need for appliance change or resting period.  Outcome: Progressing

## 2024-11-25 NOTE — DISCHARGE INSTRUCTIONS
Pacemaker  Discharge Instructions    Please make sure you have received your Temporary Pacemaker identification card with your discharge instructions      MEDICATIONS        Take only the medications prescribed to you at discharge.      ACTIVITY        Return to your normal activity, except as noted below.    Do not lift anything heavier than 10 pounds for 4 weeks with the affected arm.  This is how long it takes the muscles to heal, and the leads inside your heart to stabilize their position.  Do not reach above your head with the affected arm for 4 weeks, doing so increases the risk of lead dislodgement.    It is, however, important to move the affected arm to prevent shoulder stiffness and locking.  Avoid tight clothes or unnecessary pressure over your incision (such as bra straps or seat belts).  If it is tender or sensitive to clothing, cover the incision with a soft dressing or pad.  Avoid driving for at least 72 hours.   You may resume all other activities after 4 weeks (including exercise, sex, etc.)      SHOWERING        Leave the bandage over your incision until your clinic follow up in 10-14 days after the Pacemaker implant.  You bandage will be removed in clinic during that appointment.     It is important to keep the bandaged area clean and dry.  You may shower around the site until the bandage is removed in clinic. Thereafter, you may shower after the bandage is removed, washing it gently with soap and water. Do not apply any lotions, powders, or perfumes to the incision line.    Avoid submerging your incision in water (tub baths, hot tubs, or swimming) for four weeks.     Underneath the dressing.    If you have white steri-strips over your incision (underneath the gauze dressing), they will curl up at the end and fall off, usually within 10 days.  Do not pull them off.  - OR -   You may have a different type of closure for the incision including a dermabond adhesive which will slowly peel and come off

## 2024-11-26 VITALS
HEIGHT: 63 IN | SYSTOLIC BLOOD PRESSURE: 122 MMHG | DIASTOLIC BLOOD PRESSURE: 84 MMHG | BODY MASS INDEX: 32.25 KG/M2 | HEART RATE: 77 BPM | TEMPERATURE: 98 F | RESPIRATION RATE: 21 BRPM | WEIGHT: 182 LBS | OXYGEN SATURATION: 99 %

## 2024-11-26 LAB
ANION GAP SERPL CALC-SCNC: 4 MMOL/L (ref 2–12)
BUN SERPL-MCNC: 24 MG/DL (ref 6–20)
BUN/CREAT SERPL: 45 (ref 12–20)
CALCIUM SERPL-MCNC: 8.9 MG/DL (ref 8.5–10.1)
CHLORIDE SERPL-SCNC: 108 MMOL/L (ref 97–108)
CO2 SERPL-SCNC: 30 MMOL/L (ref 21–32)
CREAT SERPL-MCNC: 0.53 MG/DL (ref 0.55–1.02)
GLUCOSE BLD STRIP.AUTO-MCNC: 107 MG/DL (ref 65–117)
GLUCOSE BLD STRIP.AUTO-MCNC: 211 MG/DL (ref 65–117)
GLUCOSE BLD STRIP.AUTO-MCNC: 315 MG/DL (ref 65–117)
GLUCOSE SERPL-MCNC: 105 MG/DL (ref 65–100)
MAGNESIUM SERPL-MCNC: 1.9 MG/DL (ref 1.6–2.4)
POTASSIUM SERPL-SCNC: 3.9 MMOL/L (ref 3.5–5.1)
SERVICE CMNT-IMP: ABNORMAL
SERVICE CMNT-IMP: ABNORMAL
SERVICE CMNT-IMP: NORMAL
SODIUM SERPL-SCNC: 142 MMOL/L (ref 136–145)

## 2024-11-26 PROCEDURE — 82962 GLUCOSE BLOOD TEST: CPT

## 2024-11-26 PROCEDURE — 97530 THERAPEUTIC ACTIVITIES: CPT

## 2024-11-26 PROCEDURE — 80048 BASIC METABOLIC PNL TOTAL CA: CPT

## 2024-11-26 PROCEDURE — 36415 COLL VENOUS BLD VENIPUNCTURE: CPT

## 2024-11-26 PROCEDURE — 6370000000 HC RX 637 (ALT 250 FOR IP): Performed by: INTERNAL MEDICINE

## 2024-11-26 PROCEDURE — 6370000000 HC RX 637 (ALT 250 FOR IP): Performed by: STUDENT IN AN ORGANIZED HEALTH CARE EDUCATION/TRAINING PROGRAM

## 2024-11-26 PROCEDURE — 94761 N-INVAS EAR/PLS OXIMETRY MLT: CPT

## 2024-11-26 PROCEDURE — 6370000000 HC RX 637 (ALT 250 FOR IP)

## 2024-11-26 PROCEDURE — 97162 PT EVAL MOD COMPLEX 30 MIN: CPT

## 2024-11-26 PROCEDURE — 6370000000 HC RX 637 (ALT 250 FOR IP): Performed by: HOSPITALIST

## 2024-11-26 PROCEDURE — 93005 ELECTROCARDIOGRAM TRACING: CPT | Performed by: STUDENT IN AN ORGANIZED HEALTH CARE EDUCATION/TRAINING PROGRAM

## 2024-11-26 PROCEDURE — 97165 OT EVAL LOW COMPLEX 30 MIN: CPT

## 2024-11-26 PROCEDURE — 83735 ASSAY OF MAGNESIUM: CPT

## 2024-11-26 RX ORDER — GABAPENTIN 300 MG/1
300 CAPSULE ORAL NIGHTLY
Qty: 30 CAPSULE | Refills: 0 | Status: SHIPPED | OUTPATIENT
Start: 2024-11-26 | End: 2024-12-26

## 2024-11-26 RX ORDER — INSULIN LISPRO 100 [IU]/ML
5 INJECTION, SOLUTION INTRAVENOUS; SUBCUTANEOUS
Status: DISCONTINUED | OUTPATIENT
Start: 2024-11-26 | End: 2024-11-26 | Stop reason: HOSPADM

## 2024-11-26 RX ADMIN — PANTOPRAZOLE SODIUM 40 MG: 40 TABLET, DELAYED RELEASE ORAL at 07:39

## 2024-11-26 RX ADMIN — PREDNISONE 30 MG: 5 TABLET ORAL at 07:39

## 2024-11-26 RX ADMIN — PRIMIDONE 50 MG: 50 TABLET ORAL at 07:39

## 2024-11-26 RX ADMIN — INSULIN LISPRO 2 UNITS: 100 INJECTION, SOLUTION INTRAVENOUS; SUBCUTANEOUS at 12:47

## 2024-11-26 RX ADMIN — DICLOFENAC SODIUM 2 G: 10 GEL TOPICAL at 12:47

## 2024-11-26 RX ADMIN — BUMETANIDE 1 MG: 1 TABLET ORAL at 07:39

## 2024-11-26 RX ADMIN — CARVEDILOL 6.25 MG: 6.25 TABLET, FILM COATED ORAL at 16:23

## 2024-11-26 RX ADMIN — INSULIN LISPRO 6 UNITS: 100 INJECTION, SOLUTION INTRAVENOUS; SUBCUTANEOUS at 16:27

## 2024-11-26 RX ADMIN — INSULIN HUMAN 24 UNITS: 100 INJECTION, SUSPENSION SUBCUTANEOUS at 07:37

## 2024-11-26 RX ADMIN — DICLOFENAC SODIUM 2 G: 10 GEL TOPICAL at 07:39

## 2024-11-26 RX ADMIN — ACETAMINOPHEN 1000 MG: 500 TABLET ORAL at 03:34

## 2024-11-26 RX ADMIN — INSULIN LISPRO 5 UNITS: 100 INJECTION, SOLUTION INTRAVENOUS; SUBCUTANEOUS at 16:27

## 2024-11-26 RX ADMIN — ACETAMINOPHEN 1000 MG: 500 TABLET ORAL at 09:03

## 2024-11-26 RX ADMIN — CARVEDILOL 6.25 MG: 6.25 TABLET, FILM COATED ORAL at 07:39

## 2024-11-26 RX ADMIN — INSULIN LISPRO 5 UNITS: 100 INJECTION, SOLUTION INTRAVENOUS; SUBCUTANEOUS at 12:47

## 2024-11-26 RX ADMIN — ACETAMINOPHEN 1000 MG: 500 TABLET ORAL at 16:23

## 2024-11-26 ASSESSMENT — PAIN SCALES - GENERAL
PAINLEVEL_OUTOF10: 1
PAINLEVEL_OUTOF10: 2
PAINLEVEL_OUTOF10: 3
PAINLEVEL_OUTOF10: 1
PAINLEVEL_OUTOF10: 6
PAINLEVEL_OUTOF10: 3
PAINLEVEL_OUTOF10: 1
PAINLEVEL_OUTOF10: 3

## 2024-11-26 ASSESSMENT — PAIN DESCRIPTION - LOCATION
LOCATION: SHOULDER
LOCATION: CHEST
LOCATION: LEG
LOCATION: CHEST

## 2024-11-26 ASSESSMENT — PAIN DESCRIPTION - ORIENTATION
ORIENTATION: RIGHT;LEFT
ORIENTATION: LEFT
ORIENTATION: LEFT

## 2024-11-26 ASSESSMENT — PAIN SCALES - WONG BAKER: WONGBAKER_NUMERICALRESPONSE: NO HURT

## 2024-11-26 ASSESSMENT — PAIN - FUNCTIONAL ASSESSMENT: PAIN_FUNCTIONAL_ASSESSMENT: ACTIVITIES ARE NOT PREVENTED

## 2024-11-26 ASSESSMENT — PAIN DESCRIPTION - DESCRIPTORS
DESCRIPTORS: ACHING
DESCRIPTORS: ACHING
DESCRIPTORS: DISCOMFORT

## 2024-11-26 NOTE — DISCHARGE SUMMARY
Hospitalist Discharge Summary     Patient ID:  Siomara Collins  [id number]  79 y.o.  1945    Admit date: 11/22/2024    Discharge date and time: 11/26/2024    Admission Diagnoses: Bradycardia [R00.1]  Symptomatic bradycardia [R00.1]  Acute on chronic congestive heart failure, unspecified heart failure type ([facility]) [I50.9]    Discharge Diagnoses:    Principal Problem:    Bradycardia  Active Problems:    PMR (polymyalgia rheumatica) ([facility])    Primary hypertension    Urinary tract infection without hematuria    AV block, 2nd degree  Resolved Problems:    * No resolved hospital problems. *         Hospital Course:   79-year-old female with a PMHx of anxiety, physical disability due to pulmonary fibrosis, hypertension, diabetes who was sent from nursing home due to feeling weak and bradycardia.      Generalized weakness - POA  1st Degree 2:1 AV block with bradycardia - POA  - EKG with HR 30s- 40s & 1st degree AV block. Negative lyme titer  - Echo EF 55- 60%, normal diastolic function, moderate aortic stenosis   - Cardiology/EP following, s/p pacemaker 11/25. Cleared for DC  - Coreg resumed   - [facility]   - OP follow up with EP: F/U device clinic/wound check in 10-14 days, EP clinic follow-up in 4 months     Type 2 DM - POA, poorly controlled   - Last A1c on 9/5/24 of 10.5.   - Continue home lantus, NPH, humalog   - BG goal < 140 fasting, < 180 postprandial  - Correctional insulin, glucose monitoring, Hypoglycemic protocol      Hypertension  - POA, poorly controlled   - Coreg resumed  - Continue bumex      Prolonged Qtc - POA  - Prolonged > 500 on admission, improved 459 on repeat EKG 11/26     Low TSH   - Borderline low 0.27, normal T4 with low T3. Likely non thyroidal   - Repeat in 4-6 weeks      UTI   - UA negative this admission  - Completed OP doxy course      HLD  - POA  - Continue crestor and zetia      PMR  - POA  - Continue prednisone 30 mg QD, she takes with NPH 55 U qAM      Neuropathy  -

## 2024-11-26 NOTE — CONSULTS
[facility] CARDIOLOGY                    Cardiology Care Note     [x]Initial Encounter     []Follow-up    Patient Name: Siomara Collins - :1945 - MRN:[id number]  Primary Cardiologist: None  Consulting Cardiologist: Nella Rubi MD     Reason for encounter: Symptomatic bradycardia    HPI:       Siomara Collins is a 79 y.o. female with PMH significant for HTN, DM2, ILD, PMR, debility admitted from NH with weakness and slow HR>    Found to have UTI.    Found to have bradycardia, 2:1 AVB    Subjective:      Siomara Collins reports  Weakness .     Assessment and Plan        Bradycardia / 2:1 AVB  Will need TTE, TSH, Lyme titer, BB washout, likely will need PPM on Monday if there's no reversible causes. Pacer pads in place.  Patient hemodynamically stable, without symptoms currently.  Can use low dose dopamine or isoproterenol if needed.  EP will see Monday.  IV Cards can place temp wire as backup if needed.      2. PMR on steroids       ____________________________________________________________    Cardiac testing  24    ECHO (TTE) COMPLETE (PRN CONTRAST/BUBBLE/STRAIN/3D) 2024  3:44 PM (Final)    Interpretation Summary    Left Ventricle: Normal left ventricular systolic function with a visually estimated EF of 55 - 60%. Not well visualized. Left ventricle size is normal. Normal wall thickness. Unable to assess wall motion. Normal diastolic function.    Right Ventricle: Not well visualized. Right ventricle size is normal. Mildly increased wall thickness.    Aortic Valve: Not well visualized. Trileaflet valve. Mild regurgitation. Moderate stenosis of the aortic valve. AV mean gradient is 20 mmHg. AV peak velocity is 2.8 m/s. AV area by continuity VTI is 1.4 cm2.    Mitral Valve: Mild regurgitation.    Aorta: Not well visualized. Mildly dilated aortic root. Ao root diameter is 3.8 cm. Mildly dilated ascending aorta. Ao ascending diameter is 4.0 cm.    Image quality is technically difficult. 
    [facility] CARDIOLOGY    MAKAYLA Addendum    I have seen, interviewed, and examined the patient.  I agree with the history, exam, and was involved in the formulation of the assessment and plan as detailed in the Cardiology/Cardiac Electrophysiology consultation documentation composed today by the Advance Practice Provider (MAKAYLA, NP, PA). Please see the MAKAYLA’s note for full details, below includes any additional revisions. I have performed the exam and medical decision making portions in its entirety.     Physical exam:  Visit Vitals  BP (!) 171/65   Pulse (!) 37   Temp 97.1 °F (36.2 °C) (Axillary)   Resp 17   Ht 1.6 m (5' 3\")   Wt 82.6 kg (182 lb)   SpO2 98%   BMI 32.24 kg/m²         GENERAL: No acute distress  HEAD: AT/NC  HEENT: Sclerae anicteric, ocular muscles intact.  CARDIOVASCULAR: Bradycardic and regular normal S1/S2, no murmurs/rubs/gallops  RESPIRATORY: clear to auscultation bilaterally, without wheezes/rales/rhonchi  ABDOMINAL: soft, non-tender, non-distended, positive bowel sounds  EXTREMITIES: warm, well perfused, trace lower extremities edema, no cyanosis.   NEURO: alert, oriented, answering questions appropriately, no focal neurologic deficits  PSY: normal mood    Data: Labs, ECG, telemetry personally reviewed and interpreted    Current active problems:   2-1 AV block  Symptomatic bradycardia  PMR on steroids  Hypertension  Diabetes    Assessment and Plan:   Cardiac electrophysiology was consulted regarding the management of symptomatic bradycardia.  Patient's had 1 week of shortness of breath and generalized fatigue.  Noticed her heart rate to be in the 30s to 40s.  Presenting to the hospital with evidence of persistent bradycardia down in the 30s consistent with 2-1 AV block.  - She had been on Coreg, this has been held for more than 48 hours and she remains to be in 2-1 AV block with periods of high-grade AV block  - Remains hemodynamically stable  - TSH 0.27, free T4 within normal limits  - 
[facility]  DIABETES MANAGEMENT CONSULT    Consulted by Provider for advanced nursing evaluation and care for inpatient blood glucose management.    Evaluation and Action Plan   Siomara Collins is a 80 yo female with a history of DMT2 with A1c 10.5%, HLD, CKD,recurrent UTIs, and HTN who presented to the ED for bradycardia and dyspnea. She was admitted for treatment with Cardiology following. Primary care team initiated home doses Lantus 30 units and NPH 55 units for BG management. Unable to obtain history from her because she was sleeping. Per chart review, her PTA meds consist of Lantus 30 units daily, NPH 55 units with prednisone 30mg dose, Novolog 15 units with meals TID, and ozempic 0.25mg. Uncertain of how regularly she takes her medication but her recent A1c history suggests poor control and management with current diet and regimen. Once able to interview patient will provide better picture of her current management regimen. Uncertain of her diet but chart review shows she is wheelchair dependent with assistance needed to transfer and currently at  [facility]. She is being seen by Endocrinologist Frank Colon and PCP Irvin Vang. Recent Endocrinologist note suggests some confusion around insulin regimen and dosing, changes made to simplify insulin regimen and increase adherence.     Admission . AM fasting BG 90, below target range will decrease basal insulin to bring fasting BG within target range. Preprandial -336, once she is no longer NPO will likely place mealtime insulin. With basal insulin on board will decrease NPH to cover steroid dose and decrease risk for hypoglycemia. Will continue to monitor BG trends and adjust as needed.     Blood glucose pattern        Significant diabetes-related events over the past 24-72 hours  Fasting B  Pre-prandial: 122-336  Basal: 30 units   Bolus: n/a  Correction: 10 units     Management Rationale Action Plan   Medication 
Duplicate, see note by Dr. Rubi  
given handouts  Role of physical activity in improving insulin sensitivity and action  Procedure for blood glucose monitoring & options for low-cost products  Medications plan at discharge     Billing Code(s)   No charge    Before making these care recommendations, I personally reviewed the hospitalization record, including notes, laboratory & diagnostic data and current medications, and examined the patient at the bedside.  Total minutes: 15 minutes     ROLANDO SUGGS - CNS   Diabetes Clinical Nurse Specialist and Board Certified Advanced Diabetes Manager  [facility]  Access via [employer]   
(5' 3\")   Wt 82.6 kg (182 lb)   SpO2 97%   BMI 32.24 kg/m²      Temp (24hrs), Av.2 °F (36.8 °C), Min:98.2 °F (36.8 °C), Max:98.2 °F (36.8 °C)           Intake/Output:   No intake or output data in the 24 hours ending 24 2311    Imaging    No valid procedures specified.         CRITICAL CARE DOCUMENTATION  I had a face to face encounter with the patient, reviewed and interpreted patient data including clinical events, labs, images, vital signs, I/O's, and examined patient.  I have discussed the case and the plan and management of the patient's care with the consulting services, the bedside nurses and the respiratory therapist.      NOTE OF PERSONAL INVOLVEMENT IN CARE   This patient has a high probability of imminent, clinically significant deterioration, which requires the highest level of preparedness to intervene urgently. I participated in the decision-making and personally managed or directed the management of the following life and organ supporting interventions that required my frequent assessment to treat or prevent imminent deterioration.    I personally spent 40 minutes of critical care time.  This is time spent at this critically ill patient's bedside actively involved in patient care as well as the coordination of care.  This does not include any procedural time which has been billed separately.    ROLANDO Hernandez - [facility] Critical Care  2024

## 2024-11-26 NOTE — PLAN OF CARE
Problem: Physical Therapy - Adult  Goal: By Discharge: Performs mobility at highest level of function for planned discharge setting.  See evaluation for individualized goals.  Description: FUNCTIONAL STATUS PRIOR TO ADMISSION: She reports she has a hospital bed and requires 2 assist for transfers to a borrowed manual wheelchair. She states she tries to hold onto grab bar during transfers if able. She often needs assist to propel the wheelchair vs. Using her arms. Staff assists with sponge baths, dressing and toileting - has been using briefs recently. She does state she stayed in bed from Monday to Friday prior to coming to hospital.     HOME SUPPORT PRIOR TO ADMISSION: The patient lived at [facility].    Physical Therapy Goals  Initiated 11/26/2024  1.  Patient will move from supine to sit and sit to supine and roll side to side in bed with moderate assistance within 7 day(s).    2.  Patient will perform sit to stand with maximal assistance within 7 day(s).  3.  Patient will transfer from bed to chair and chair to bed with maximal assistance using the least restrictive device within 7 day(s).    Outcome: Progressing     PHYSICAL THERAPY EVALUATION    Patient: Siomara Collins (79 y.o. female)  Date: 11/26/2024  Primary Diagnosis: Bradycardia [R00.1]  Symptomatic bradycardia [R00.1]  Acute on chronic congestive heart failure, unspecified heart failure type (HCC) [I50.9]  Procedure(s) (LRB):  Insert PPM dual (N/A)  Ultrasound guided vascular access (N/A) 1 Day Post-Op   Precautions: Restrictions/Precautions: ROM Restrictions, Surgical Protocols, Fall Risk, Contact Precautions  Required Braces or Orthoses?:  (L UE sling x 24 hours s/p PPM 11/25/24) Required Braces or Orthoses?:  (L UE sling x 24 hours s/p PPM 11/25/24)                    ASSESSMENT:  Patient is a 79 y.o. female with h/o DM, PMR, HTN, pulmonary fibrosis admitted to [facility] on 11/22/24 diagnosed with symptomatic bradycardic with 2:1 heart

## 2024-11-26 NOTE — CARE COORDINATION
Care Management Progress Note    Reason for Admission:   Bradycardia [R00.1]  Symptomatic bradycardia [R00.1]  Acute on chronic congestive heart failure, unspecified heart failure type (HCC) [I50.9]  Procedure(s) (LRB):  Insert PPM dual (N/A)  Ultrasound guided vascular access (N/A)  1 Day Post-Op    Patient Admission Status: Inpatient  RUR: 35%  Hospitalization in the last 30 days (Readmission):  no        Transition of care plan:  [Medical]  [DC plan]    I discussed pt with PT and OT.  They are recommending SNF.  I discussed with [facility] liaison who informed me that due to pt recently being treated @ their facility,their physiatrist really wants pt to return for services.  I updated attending.    [facility] is requesting a 4;30 pm transport.  I am setting up transport through [facility]     Kailey Valdez RN

## 2024-11-26 NOTE — CARE COORDINATION
Transition of Care Plan to SNF/Rehab    Communication to Patient/Family:  Met with patient and family and they are agreeable to the transition plan. The Plan for Transition of Care is related to the following treatment goals: inpatient rehab    The Patient and/or patient representative was provided with a choice of provider and agrees  with the discharge plan.      Yes [x] No []    A Freedom of choice list was provided with basic dialogue that supports the patient's individualized plan of care/goals and shares the quality data associated with the providers.       Yes [x] No []    SNF/Rehab Transition:  Patient has been accepted to [facility] SNF/Rehab and meets criteria for admission.   Date of Inpatient Status Order:   Patient will transported by [facility] and expected to leave at 4:30 pm.    Communication to SNF/Rehab:  Bedside RN, Mary Jane, has been notified to update the transition plan to the facility and call report (phone number).  Discharge information has been updated on the AVS. And communicated to facility via [employer], or CC link.     Discharge instructions to be fax'd to facility at (Fax #). Sent with pt and [facility] can access EMR    Please call report nurse to nurse to [phone].      Nursing Please include all hard scripts for controlled substances, med rec and dc summary, and AVS in packet.     Reviewed and confirmed with facility, [facility] can manage the patient care needs for the following:     Jerry with (X) only those applicable:  Medication:  [x]Medications are available at the facility  []IV Antibiotics    []Controlled Substance - hard copies available sent.  []Weekly Labs Accuchecks    Equipment:  []CPAP/BiPAP  []Wound Vacuum  []Carl or Urinary Device  []PICC/Central Line  []Nebulizer  []Ventilator    Treatment:  [x]Isolation (for MRSA, VRE, etc.)MDRO and ESBL  []Surgical Drain Management  []Tracheostomy Care  []Dressing Changes  []Dialysis with transportation  []PEG

## 2024-11-26 NOTE — PROGRESS NOTES
Hospitalist Progress Note      NAME:  Siomara Collins   :  1945  MRM:  [id number]    Date/Time: 2024  7:34 AM           Assessment / Plan:     9-year-old female with a PMHx of anxiety, physical disability due to pulmonary fibrosis, hypertension, diabetes who was sent from nursing home due to feeling weak and bradycardia. Now pending placement to SNF    Generalized weakness - POA  1st Degree 2:1 AV block with bradycardia - POA  - EKG with HR 30s- 40s & 1st degree AV block. Negative lyme titer  - Echo EF 55- 60%, normal diastolic function, moderate aortic stenosis   - Cardiology/EP following, s/p pacemaker . Cleared for DC  - Coreg resumed   - PT/OT recommend SNF     Type 2 DM - POA, poorly controlled   - Last A1c on 24 of 10.5.   - Continue home lantus, adjust dosing as needed for BG control. Also uses NPH with steroid as below   - DM management consult   - BG goal < 140 fasting, < 180 postprandial  - Correctional insulin, glucose monitoring, Hypoglycemic protocol     Hypertension  - POA, poorly controlled   - Coreg resumed  - Will DC lisinopril now that she is back on home BP regimen with coreg   - Continue bumex     Prolonged Qtc - POA  - Prolonged > 500 on admission, improved 459 on repeat EKG   - Keep K > 4, Mag > 2    Low TSH   - Borderline low 0.27, normal T4 with low T3. Likely non thyroidal   - Repeat in 4-6 weeks     UTI   - UA negative this admission  - Completed OP doxy course     HLD  - POA  - Continue crestor and zetia     PMR  - POA  - Continue prednisone 30 mg QD, she takes with NPH 55 U qAM     Neuropathy  - POA  - Continue gabapentin     GERD - POA,  - Continue ppi     Asthma  - POA  - Continue Singulair     Anxiety  - POA  - Seroquel on hold 2/2 prolonged Qtc    Tremor   - Continue home primidone        #BMI (Calculated): 32.25    I have personally reviewed the radiographs, laboratory data in Epic and decisions and statements above are based partially on this personal 
    Hospitalist Progress Note      NAME:  Siomara Collins   :  1945  MRM:  [id number]    Date/Time: 2024  7:50 AM           Assessment / Plan:     9-year-old female with a PMHx of anxiety, physical disability due to pulmonary fibrosis, hypertension, diabetes who was sent from nursing home due to feeling weak and bradycardia.     Generalized weakness - POA  1st Degree 2:1 AV block with bradycardia - POA  - EKG with HR 30s- 40s  - Stop coreg, BB wash out   - Cardiology following, plan for pacemaker Monday   - Follow up TTE, Lyme titer  - If hemodynamically unstable, can start dopamine or isuprel drip.   - Will transfer out of ICU today to step down on tele, monitor HR and BP closely     Type 2 DM - POA, poorly controlled   - Last A1c on 24 of 10.5.   - Continue home lantus, adjust dosing as needed for BG control. Also uses NPH with steroid as below   - DM management consult   - BG goal < 140 fasting, < 180 postprandial  - Correctional insulin, glucose monitoring, Hypoglycemic protocol     Hypertension  - POA, poorly controlled   - Discontinue Coreg  - Elevated BP, start lisinopril   - Continue bumex     HLD  - POA  - Continue crestor and zetia     PMR  - POA  - Continue prednisone 30 mg QD, she takes with NPH 55 U qAM     Neuropathy  - POA  - Continue gabapentin     GERD - POA,  - Continue ppi     Asthma  - POA  - Continue Singulair     Anxiety  - POA  - Seroquel on hold 2/2 prolonged Qtc    Prolonged Qtc - POA  - Prolonged > 500 on admission  - Repeat EKG     UTI   - UA negative this admission  - Continue doxy     Tremor   - Continue home primidone        #BMI (Calculated): 32.25    I have personally reviewed the radiographs, laboratory data in Epic and decisions and statements above are based partially on this personal interpretation.                 Care Plan discussed with: Patient, Care Manager, Nursing Staff, and Consultant/Specialist    Discussed:  Care Plan and D/C Planning    Prophylaxis:  
    Hospitalist Progress Note      NAME:  Siomara Collins   :  1945  MRM:  [id number]    Date/Time: 2024  7:09 AM           Assessment / Plan:     9-year-old female with a PMHx of anxiety, physical disability due to pulmonary fibrosis, hypertension, diabetes who was sent from nursing home due to feeling weak and bradycardia.     Generalized weakness - POA  1st Degree 2:1 AV block with bradycardia - POA  - EKG with HR 30s- 40s & 1st degree AV block   - Echo EF 55- 60%, normal diastolic function, moderate aortic stenosis   - Stop coreg, BB wash out   - Cardiology following, plan for pacemaker Monday   - Follow up Lyme titer  - If hemodynamically unstable, can start dopamine or isuprel drip.   - Monitor HR and BP closely   - PT/OT     Type 2 DM - POA, poorly controlled   - Last A1c on 24 of 10.5.   - Continue home lantus, adjust dosing as needed for BG control. Also uses NPH with steroid as below   - DM management consult   - BG goal < 140 fasting, < 180 postprandial  - Correctional insulin, glucose monitoring, Hypoglycemic protocol     Hypertension  - POA, poorly controlled   - Discontinue Coreg  - Elevated BP, started on lisinopril   - Continue bumex     Prolonged Qtc - POA  - Prolonged > 500 on admission  - Keep K > 4, Mag > 2    Low TSH   - Borderline low 0.27, suspect subclinical vs non thyroidal   - Check T4 & T3    UTI   - UA negative this admission  - Continue OP doxy course     HLD  - POA  - Continue crestor and zetia     PMR  - POA  - Continue prednisone 30 mg QD, she takes with NPH 55 U qAM     Neuropathy  - POA  - Continue gabapentin     GERD - POA,  - Continue ppi     Asthma  - POA  - Continue Singulair     Anxiety  - POA  - Seroquel on hold 2/2 prolonged Qtc    Tremor   - Continue home primidone        #BMI (Calculated): 32.25    I have personally reviewed the radiographs, laboratory data in Epic and decisions and statements above are based partially on this personal interpretation.       
  Physician Progress Note      PATIENT:               ABDI PORTILLO  CSN #:                  [id number]  :                       1945  ADMIT DATE:       2024 7:03 PM  DISCH DATE:  RESPONDING  PROVIDER #:        Whitney Prince MD          QUERY TEXT:    Good Afternoon    This patient admitted on 2024- for  aV block.    The medical record notes, the patient recently admitted for UTI.  \"UTI , UA is negative this admission\"    If possible, please document in progress notes and discharge summary the   clinical indicators to support this diagnosis on current admission or clarify   current status of UTI    The medical record reflects the following:  Risk Factors: Recent admission for  ESBL UTI, DM Physical debility  Clinical Indicators: Presented with generalized weakness, UA this admission   negative for bacteria and leukocyte, Pt recently admitted for UTI  Treatment: UA, no urine cx, Pt is continuing on OP doxy course .    Thank you  JEREMIAH DickersonN,Rn,CPHQ, CCDS, SMART  Options provided:  -- UTI is currently being treated/evaluated as evidenced by, Please document   supportive evidence.  -- No clinical evidence of  UTI currently. UTI is PMH only  -- Other - I will add my own diagnosis  -- Disagree - Not applicable / Not valid  -- Disagree - Clinically unable to determine / Unknown  -- Refer to Clinical Documentation Reviewer    PROVIDER RESPONSE TEXT:    UTI is currently being treated/evaluated as evidenced by Outpatient diagnosis   of UTI in process of treatment while inpatient    Query created by: Vee Sanchez on 2024 1:07 PM      Electronically signed by:  Whitney Prince MD 2024 9:13 AM          
 [id number]    
0700- Bedside shift report received from Susanne JHA.     0804- Dr. Prince contacted at this time regarding patients insulin regimen and SBP >180. Dr. Pirnce gave orders to start oral norvasc that is scheduled and to hold off on giving insulin at this time until she confirms the regimen with the patient.     0839- Pt refused prednisone d/t no NPH being scheduled. This Rn explained the doctor would come talk to her about her DM regimen.     1651- Dr. Prince notified of ventricular rhythm that occurred at 1537. No symptoms per the patient. Rhythm lasted <5 min and flipped back into SB with 1st AV block/ RBBB. Orders for EKG.    1700- Results of EKG sent to Dr. Prince. Orders to continue to monitor and obtain another EKG if pt flips into another ventricular rhythm.     Ronnie Ayala RN  
1600: Assumed care of patient who shortly after was taken to CCL for PPM placement.     1850: Patient arrived back to unit from CCL. PM site to left chest - CDI. VSS. Call bell within reach.   
2355:  TRANSFER - IN REPORT:    Verbal report received from YUDELKA Reyes on Siomara Collins  being received from ED 2 for routine progression of patient care      Report consisted of patient's Situation, Background, Assessment and   Recommendations(SBAR).     Information from the following report(s) Nurse Handoff Report, ED Encounter Summary, ED SBAR, Adult Overview, Med Rec Status, Cardiac Rhythm Sinus Bradycardia, 1st degree block, Neuro Assessment, and Event Log was reviewed with the receiving nurse.    Opportunity for questions and clarification was provided.      Assessment completed upon patient's arrival to unit and care assumed.     0030: Pt arrived on unit. Dual skin with YUDELKA Mauricio performed as well full assessment. Pt bathed, gown changed, repositioned, and orientated to room. Questions about pacemaker answered and assured pt that a cardiologist will discuss with her the procedure before anything is performed.     0700: Bedside and Verbal shift change report given to YUDELKA Trujillo (oncoming nurse) by YUDELKA Skinner (offgoing nurse). Report included the following information Nurse Handoff Report, Index, ED Encounter Summary, Adult Overview, Intake/Output, MAR, Recent Results, Med Rec Status, Cardiac Rhythm Sinus Hector/1st Degree AV Block, Alarm Parameters, Neuro Assessment, and Event Log.       
ED paged regarding bradycardia. EKGs and rhythm strips reviewed and rhythm appears to be 2:1 AV block. Was on Coreg at home. Patient is hemodynamically stable with SBP in the 160s per report, labs reviewed with normal Cr. Advised to monitor in CCU. Keep K>4 and Mg>2.  Will need TTE, TSH, Lyme titer, BB washout, likely will need PPM on Monday if there's no reversible causes. Cardiology to see in the AM.   
EP brief followup note  Patient seen and examined this morning. Sitting in bed.  Telemetry reviewed this morning shows atrial sensed ventricular paced rhythm throughout the night and this morning. No significant tele events.    # 2-1 AV Block  # Symptomatic Bradycardia    S/p Medtronic dual chamber permanent (Left-bundle branch area) pacemaker by Dr. Castaneda    CXR reviewed shows table lead positioning and no pneumothorax.    Device interrogation performed this morning was reviewed in detail me.  It shows stable lead impedances, sensing and capture thresholds.    --EP service will sign off        
Occupational Therapy: hold    Chart reviewed in preparation for OT evaluation. Note patient is anticipating PPM implant today for symptomatic bradycardia.  Will hold activity at this time and will follow-up as able and appropriate after PPM implant.    Gabe Treviño, OTR/L  
Patient tells me she is DNR updated  
Physical Therapy Note:    PT order received and chart reviewed. Patient admitted with symptomatic bradycardia and remains bradycardic this morning with HR <40bpm at rest. EMR indicates plan for PPM insertion today. Will hold PT evaluation at this time and continue to follow for evaluation when appropriate and able.    Thank you,  Lizbet Hankins, PT, DPT   
Spiritual Health History and Assessment/Progress Note  [facility]    Rituals, Rites and Sacraments,  , Adjustment to illness,      Name: Siomara Collins MRN: [id number]    Age: 79 y.o.     Sex: female   Language: English   Zoroastrian: Methodist   Bradycardia     Date: 11/26/2024            Total Time Calculated: 5 min              Spiritual Assessment began in [facility] INTENSIVE CARE UNIT        Referral/Consult From: Clergy/   Encounter Overview/Reason: Rituals, Rites and Sacraments  Service Provided For: Patient    Amy, Belief, Meaning:   Patient is connected with a amy tradition or spiritual practice  Family/Friends No family/friends present      Importance and Influence:  Patient unable to assess at this time  Family/Friends No family/friends present    Community:  Patient Other: unknown  Family/Friends No family/friends present    Assessment and Plan of Care:     Patient Interventions include: Provided sacramental/Bahai ritual  Family/Friends Interventions include: No family/friends present    Patient Plan of Care: Spiritual Care available upon further referral  Family/Friends Plan of Care: Spiritual Care available upon further referral    Electronically signed by Chaplain BASILIO on 11/26/2024 at 2:39 PM     [facility] visit attempted. Mrs. Collins was in bed.  She is Methodist. She wanted to see her nurse. Passed the message on to staff and when this  returned, Mrs. Collins was asleep. Prayer for spiritual communion offered for her well-being. No family was present at the time of the visit.     Sr. CAMRYN Singh, RN, ACSW, LCSW   Page:  [phone]  
Spiritual Health History and Assessment/Progress Note  [facility]    Loneliness/Social Isolation,  , Adjustment to illness,      Name: Siomara Collins MRN: [id number]    Age: 79 y.o.     Sex: female   Language: English   Faith: Muslim   Bradycardia     Date: 11/23/2024            Total Time Calculated: 17 min              Spiritual Assessment began in [facility]ENSIVE CARE UNIT        Referral/Consult From: Rounding   Encounter Overview/Reason: Loneliness/Social Isolation  Service Provided For: Patient    Amy, Belief, Meaning:   Patient identifies as spiritual, is connected with a amy tradition or spiritual practice, has beliefs or practices that help with coping during difficult times, and Other: Ms Collins shared that she was Muslim  Family/Friends No family/friends present      Importance and Influence:  Patient has spiritual/personal beliefs that influence decisions regarding their health  Family/Friends No family/friends present    Community:  Patient feels well-supported. Support system includes: Children  Family/Friends No family/friends present    Assessment and Plan of Care:     Patient Interventions include: Facilitated expression of thoughts and feelings, Explored spiritual coping/struggle/distress, Affirmed coping skills/support systems, and Other: Tech was at patient's bedside performing a procedure during the visit.Provided spiritual presence and active listening as patient shared about her current health situation. She stated that she was Muslim and didn't have her rosary with her and that she would like to have one. She shared that she used to attend [facility]'s [employer] but now lived in an assisted living facility and wasn't able to attend. Acknowledged her feelings and concerns and offered words of support.  Assured patient of prayers on her behalf and of ongoing  availability for support.  Family/Friends Interventions include: No family/friends 
or gallop. No thrills.   Skin: No rashes    Neuro: Moving all four extremities, follows commands appropriately  Psych: Good insight, oriented to person, place, alert, Nml Affect  LE: No edema    Data Review:     Radiology:   XR Results (most recent):  Xray Result (most recent):  XR CHEST STANDARD TWO VW 10/18/2024    Narrative  Indication:  Pain    Exam: PA and lateral views of the chest.    Direct comparison is made to prior CXR dated September 2024    Findings: Cardiomediastinal silhouette is stable. There is moderate elevation of  the right hemidiaphragm. Lungs are grossly clear. No pleural fluid is  visualized. There is no pneumothorax. Extensive degenerative changes are noted  in both shoulders.    Impression  Grossly clear lungs      Electronically signed by Michael Floyd MD      CT Result (most recent):  CT ABDOMEN PELVIS W IV CONTRAST 10/19/2024    Narrative  INDICATION: elevated LFTs, lactic acidosis, sepsis, abdominal pain and diarrhea,  eval for cholecystitis, colitis, cystitis    COMPARISON: 6/18/2024    TECHNIQUE:  Following the intravenous administration of IV contrast, thin axial images were  obtained through the abdomen and pelvis. Coronal and sagittal reconstructions  were generated. CT dose reduction was achieved through use of a standardized  protocol tailored for this examination and automatic exposure control for dose  modulation.    FINDINGS:  LUNG BASES: No abnormality.  LIVER: Steatosis.  GALLBLADDER: Unremarkable.  SPLEEN: No enlargement or lesion.  PANCREAS: No mass or ductal dilatation.  ADRENALS: No mass.  KIDNEYS: Left renal cyst. No hydronephrosis.  GI TRACT: No bowel obstruction. Difficult to assess bowel wall thickening given  lack of oral contrast material.  APPENDIX: Not visualized.  PERITONEUM: No free air or free fluid.  RETROPERITONEUM: No aortic aneurysm.  LYMPH NODES: None enlarged.  ADDITIONAL COMMENTS: Atrophy of the anterior abdominal wall musculature with  postsurgical 
tablet 50 mg  50 mg Oral BID            Lab Review:     Recent Labs     11/22/24 1927 11/23/24 0340 11/24/24 0443   WBC 7.9 8.3 7.9   HGB 13.3 12.1 12.9   HCT 40.5 37.2 38.8    160 162     Recent Labs     11/22/24 1927 11/23/24 0340 11/24/24 0443 11/25/24  0416    143 141 142   K 4.2 3.8 3.7 3.6    107 106 105   CO2 32 32 32 33*   BUN 29* 29* 27* 27*   MG 1.7  --  2.0 1.9   ALT 58  --   --   --      No components found for: \"GLPOC\"

## 2024-11-26 NOTE — PLAN OF CARE
Problem: Pain  Goal: Verbalizes/displays adequate comfort level or baseline comfort level  Outcome: Progressing  Flowsheets  Taken 11/25/2024 2001  Verbalizes/displays adequate comfort level or baseline comfort level:   Encourage patient to monitor pain and request assistance   Assess pain using appropriate pain scale   Administer analgesics based on type and severity of pain and evaluate response   Implement non-pharmacological measures as appropriate and evaluate response   Notify Licensed Independent Practitioner if interventions unsuccessful or patient reports new pain   Consider cultural and social influences on pain and pain management  Taken 11/25/2024 2000  Verbalizes/displays adequate comfort level or baseline comfort level:   Encourage patient to monitor pain and request assistance   Assess pain using appropriate pain scale   Administer analgesics based on type and severity of pain and evaluate response   Implement non-pharmacological measures as appropriate and evaluate response   Consider cultural and social influences on pain and pain management   Notify Licensed Independent Practitioner if interventions unsuccessful or patient reports new pain     Problem: Skin/Tissue Integrity  Goal: Absence of new skin breakdown  Description: 1.  Monitor for areas of redness and/or skin breakdown  2.  Assess vascular access sites hourly  3.  Every 4-6 hours minimum:  Change oxygen saturation probe site  4.  Every 4-6 hours:  If on nasal continuous positive airway pressure, respiratory therapy assess nares and determine need for appliance change or resting period.  Outcome: Progressing     Problem: Cardiovascular - Adult  Goal: Maintains optimal cardiac output and hemodynamic stability  Outcome: Progressing  Flowsheets  Taken 11/26/2024 0400  Maintains optimal cardiac output and hemodynamic stability:   Monitor blood pressure and heart rate   Monitor urine output and notify Licensed Independent Practitioner for

## 2024-11-26 NOTE — PLAN OF CARE
Problem: Occupational Therapy - Adult  Goal: By Discharge: Performs self-care activities at highest level of function for planned discharge setting.  See evaluation for individualized goals.  Description: FUNCTIONAL STATUS PRIOR TO ADMISSION:  Patient has had numerous hospital admissions/ rehab stays in 2024.  Prior to current admission, she resided at [facility] and was getting HH therapies.  She reports she had progressive weakness and was bedbound for 5 days prior to admission with assist for bathing/ dressing/ toileting at bed level.    Receives Help From: Home health, Personal care attendant (having home PT & OT since returning to [facility]), ADL Assistance:  (sponge bath with staff assist; toileting with briefs over last weeks),  ,  ,  ,  , Toileting: Needs assistance, Homemaking Assistance: Needs assistance, Ambulation Assistance:  (staff often assists with wheelchair mobility but she states sometimes she can self propel with B UE's; working get electric WC through PCP), Transfer Assistance:  (2 assist with use of grab bar to stand pivot to wheelchair), Active : No       Occupational Therapy Goals:  Initiated 11/26/2024  1.  Patient will perform bathing with Moderate Assist within 7 day(s).  2.  Patient will perform upper body dressing with Set-up within 7 day(s).  3.  Patient will perform lower body dressing with Moderate Assist within 7 day(s).  4.  Patient will perform toilet transfers with Moderate Assist  within 7 day(s).  5.  Patient will perform all aspects of toileting with Moderate Assist within 7 day(s).  6.  Patient will utilize energy conservation techniques during functional activities without cuing within 7 day(s).    11/26/2024 1235 by Gabe Cisneros, OT  Outcome: Progressing  11/26/2024 1129 by Gabe Cisneros, OT  Outcome: Not Progressing    OCCUPATIONAL THERAPY EVALUATION    Patient: Siomara Collins (79 y.o. female)  Date: 11/26/2024  Primary Diagnosis: Bradycardia [R00.1]  Symptomatic

## 2024-11-27 LAB
EKG ATRIAL RATE: 77 BPM
EKG DIAGNOSIS: NORMAL
EKG P AXIS: 8 DEGREES
EKG P-R INTERVAL: 218 MS
EKG Q-T INTERVAL: 406 MS
EKG QRS DURATION: 118 MS
EKG QTC CALCULATION (BAZETT): 459 MS
EKG R AXIS: 76 DEGREES
EKG T AXIS: 250 DEGREES
EKG VENTRICULAR RATE: 77 BPM

## 2024-11-27 PROCEDURE — 93010 ELECTROCARDIOGRAM REPORT: CPT | Performed by: INTERNAL MEDICINE

## 2024-12-09 ENCOUNTER — TELEPHONE (OUTPATIENT)
Age: 79
End: 2024-12-09

## 2024-12-09 RX ORDER — INSULIN GLARGINE 100 [IU]/ML
25 INJECTION, SOLUTION SUBCUTANEOUS DAILY
Qty: 15 ML | Refills: 11 | Status: SHIPPED | OUTPATIENT
Start: 2024-12-09

## 2024-12-09 NOTE — TELEPHONE ENCOUNTER
Last read by Siomara Collins at 11:03 AM on 12/9/2024.     Pt read message regarding medication dose adjustment.

## 2024-12-09 NOTE — TELEPHONE ENCOUNTER
12/9/2024  10:17 AM      Patient called and stated her sugar is 59 right now.    PT#[phone]    Thanks,  Simi Cisneros

## 2024-12-09 NOTE — TELEPHONE ENCOUNTER
Pt states she has been having low blood sugars in the AM, most below 70. She says she leaves the rehab facility to go home this afternoon. She says they have taken care of her low readings but the physician at the facility asked that she reach out to Dr Colon for adjustments. Pt states she is being given her diabetic medications as last prescribed.

## 2024-12-09 NOTE — TELEPHONE ENCOUNTER
Please have her decrease her lantus to 25 units in the morning and keep the NPH the same at 55 units in the morning and the novolog scale the same as the letter from 11/6/24.

## 2024-12-10 ENCOUNTER — PROCEDURE VISIT (OUTPATIENT)
Age: 79
End: 2024-12-10
Payer: MEDICARE

## 2024-12-10 DIAGNOSIS — Z95.0 CARDIAC PACEMAKER IN SITU: Primary | ICD-10-CM

## 2024-12-10 PROCEDURE — 93280 PM DEVICE PROGR EVAL DUAL: CPT | Performed by: INTERNAL MEDICINE

## 2024-12-10 NOTE — PROGRESS NOTES
Patient presents for wound check post-device implantation. The dressing was removed and the site was inspected. The site appeared to be well-healing without ecchymosis/tenderness/erythema. Denies pain, fevers, discharge.           Future Appointments   Date Time Provider Department Center   12/16/2024  3:50 PM [facility]   1/30/2025 12:10 PM Frank Colon MD RDE [facility] BS AMB   2/20/2025 10:00 AM PACEMAKER, [facility] AMB   2/20/2025 10:20 AM Althea Cisneros, APRN - NP [facility] BS AMB         Reviewed limb restrictions and site care, patient verbalized understanding  Continue follow up in device clinic as planned.

## 2024-12-10 NOTE — PATIENT INSTRUCTIONS
Implant date:  11/25/24  Restrictions through 12/23/24        Restrictions for Pacemakers and ICDs     Follow up will be scheduled for 10-14 days post implant with our device clinic and then 3 months post implant with MD.   This is subject to change based off of discharge orders placed by MD or NP.       Restrictions:    NO raising affected arm above shoulder height until: 1 month (or 4 weeks) post implant   NO lifting (with affected arm) heavier than 10 pounds until:  1 month (or 4 weeks) post implant, slowly work back into regular activity after   NO fast, swinging motions (raking, golfing/swimming/power walking) until:  6 weeks post implant, slowly work back into regular activity after   NO arc welding or chainsaw use: ICD: NOT allowed  Pacemaker: 1 month (or 4 weeks) post implant. Refer to patient number on device card to reference certain equipment.   NO soaking in water (bathtub, hot tub, pool, river, ocean, etc.): 1 month (or 4 weeks) post implant or until completely healed   Allowed to shower: Ok to get bandage wet, OK to shower after bandage removed. Do not let shower beat on incision site. Pat dry.   NO dental work for 8 weeks after your implant. (antibiotics only needed with certain procedures)   MRI compatible devices AND leads:  Must wait until 6 weeks after implant.    DRIVING: No driving for 3 days, or longer depending on MD's recommendations. You must wear seatbelt per [place] law. If this is uncomfortable, use a pad or towel over the site or avoid driving until discomfort lessens.     **Remote monitoring is STRONGLY recommended for our device clinic and instruction will be given based off of your device and device company.

## 2024-12-12 ENCOUNTER — TELEPHONE (OUTPATIENT)
Facility: CLINIC | Age: 79
End: 2024-12-12

## 2024-12-12 NOTE — TELEPHONE ENCOUNTER
Spoke with the Transition of care nurse Esperanza Ann, from the hospital she had some concerns about Ms. Collins having a UTI because the patient was complaining of burning when she urinates. Esperanza knows that Ms. Collins has an appointment on 12/16/24 with Dr. Vang and she didn't want the patient to wait until then because of her symptoms she was complaining of and she wanted me to reach out to the patient to see if we can get her in sooner than Monday, I did advise the nurse that Dr. Vang is out of the office and I would have to talk with the on call provider for this week. I did talk with Dr. Galeas about seeing Ms. Collins and she was willing to see her.    I did call the patient and did advise her about what Mrs. Mata was concerned about, the patient did refuse to come into the office and she insisted she wait until Monday to see Dr. Vang because of transportation issues. Patient verbalized understanding.

## 2024-12-16 ENCOUNTER — OFFICE VISIT (OUTPATIENT)
Facility: CLINIC | Age: 79
End: 2024-12-16
Payer: MEDICARE

## 2024-12-16 VITALS
DIASTOLIC BLOOD PRESSURE: 100 MMHG | HEART RATE: 88 BPM | OXYGEN SATURATION: 95 % | TEMPERATURE: 98.6 F | SYSTOLIC BLOOD PRESSURE: 156 MMHG

## 2024-12-16 DIAGNOSIS — E11.65 POORLY CONTROLLED DIABETES MELLITUS (HCC): ICD-10-CM

## 2024-12-16 DIAGNOSIS — J96.11 CHRONIC HYPOXEMIC RESPIRATORY FAILURE: ICD-10-CM

## 2024-12-16 DIAGNOSIS — E66.811 CLASS 1 OBESITY DUE TO EXCESS CALORIES WITHOUT SERIOUS COMORBIDITY WITH BODY MASS INDEX (BMI) OF 33.0 TO 33.9 IN ADULT: ICD-10-CM

## 2024-12-16 DIAGNOSIS — N39.0 RECURRENT UTI: ICD-10-CM

## 2024-12-16 DIAGNOSIS — R00.1 BRADYCARDIA: ICD-10-CM

## 2024-12-16 DIAGNOSIS — E66.09 CLASS 1 OBESITY DUE TO EXCESS CALORIES WITHOUT SERIOUS COMORBIDITY WITH BODY MASS INDEX (BMI) OF 33.0 TO 33.9 IN ADULT: ICD-10-CM

## 2024-12-16 DIAGNOSIS — N18.2 CKD (CHRONIC KIDNEY DISEASE), STAGE II: ICD-10-CM

## 2024-12-16 DIAGNOSIS — J84.9 INTERSTITIAL LUNG DISEASE (HCC): ICD-10-CM

## 2024-12-16 DIAGNOSIS — I44.1 AV BLOCK, 2ND DEGREE: Primary | ICD-10-CM

## 2024-12-16 DIAGNOSIS — I10 PRIMARY HYPERTENSION: ICD-10-CM

## 2024-12-16 PROBLEM — D72.829 LEUKOCYTOSIS: Status: RESOLVED | Noted: 2024-10-24 | Resolved: 2024-12-16

## 2024-12-16 PROBLEM — R73.09 HEMOGLOBIN A1C GREATER THAN 9%, INDICATING POOR DIABETIC CONTROL: Status: RESOLVED | Noted: 2024-07-30 | Resolved: 2024-12-16

## 2024-12-16 PROBLEM — A49.9 ESBL (EXTENDED SPECTRUM BETA-LACTAMASE) PRODUCING BACTERIA INFECTION: Status: RESOLVED | Noted: 2024-07-26 | Resolved: 2024-12-16

## 2024-12-16 PROBLEM — R73.9 HYPERGLYCEMIA: Status: RESOLVED | Noted: 2024-09-05 | Resolved: 2024-12-16

## 2024-12-16 PROBLEM — R79.89 ELEVATED LFTS: Status: RESOLVED | Noted: 2017-08-24 | Resolved: 2024-12-16

## 2024-12-16 PROBLEM — E16.2 HYPOGLYCEMIA: Status: RESOLVED | Noted: 2023-03-12 | Resolved: 2024-12-16

## 2024-12-16 PROBLEM — Z16.12 ESBL (EXTENDED SPECTRUM BETA-LACTAMASE) PRODUCING BACTERIA INFECTION: Status: RESOLVED | Noted: 2024-07-26 | Resolved: 2024-12-16

## 2024-12-16 PROBLEM — Z79.4 TYPE 2 DIABETES MELLITUS WITH HYPERGLYCEMIA, WITH LONG-TERM CURRENT USE OF INSULIN (HCC): Status: RESOLVED | Noted: 2024-09-05 | Resolved: 2024-12-16

## 2024-12-16 PROBLEM — R30.0 DYSURIA: Status: RESOLVED | Noted: 2024-07-28 | Resolved: 2024-12-16

## 2024-12-16 PROCEDURE — 1111F DSCHRG MED/CURRENT MED MERGE: CPT | Performed by: INTERNAL MEDICINE

## 2024-12-16 PROCEDURE — G8427 DOCREV CUR MEDS BY ELIG CLIN: HCPCS | Performed by: INTERNAL MEDICINE

## 2024-12-16 PROCEDURE — 1123F ACP DISCUSS/DSCN MKR DOCD: CPT | Performed by: INTERNAL MEDICINE

## 2024-12-16 PROCEDURE — 1126F AMNT PAIN NOTED NONE PRSNT: CPT | Performed by: INTERNAL MEDICINE

## 2024-12-16 PROCEDURE — G8484 FLU IMMUNIZE NO ADMIN: HCPCS | Performed by: INTERNAL MEDICINE

## 2024-12-16 PROCEDURE — 3079F DIAST BP 80-89 MM HG: CPT | Performed by: INTERNAL MEDICINE

## 2024-12-16 PROCEDURE — G8417 CALC BMI ABV UP PARAM F/U: HCPCS | Performed by: INTERNAL MEDICINE

## 2024-12-16 PROCEDURE — 99215 OFFICE O/P EST HI 40 MIN: CPT | Performed by: INTERNAL MEDICINE

## 2024-12-16 PROCEDURE — 3077F SYST BP >= 140 MM HG: CPT | Performed by: INTERNAL MEDICINE

## 2024-12-16 PROCEDURE — 1090F PRES/ABSN URINE INCON ASSESS: CPT | Performed by: INTERNAL MEDICINE

## 2024-12-16 PROCEDURE — 1036F TOBACCO NON-USER: CPT | Performed by: INTERNAL MEDICINE

## 2024-12-16 PROCEDURE — 1159F MED LIST DOCD IN RCRD: CPT | Performed by: INTERNAL MEDICINE

## 2024-12-16 PROCEDURE — 1160F RVW MEDS BY RX/DR IN RCRD: CPT | Performed by: INTERNAL MEDICINE

## 2024-12-16 PROCEDURE — G8400 PT W/DXA NO RESULTS DOC: HCPCS | Performed by: INTERNAL MEDICINE

## 2024-12-16 PROCEDURE — 3046F HEMOGLOBIN A1C LEVEL >9.0%: CPT | Performed by: INTERNAL MEDICINE

## 2024-12-16 RX ORDER — NITROFURANTOIN 25; 75 MG/1; MG/1
100 CAPSULE ORAL 2 TIMES DAILY
Qty: 14 CAPSULE | Refills: 0 | Status: SHIPPED | OUTPATIENT
Start: 2024-12-16 | End: 2024-12-23

## 2024-12-16 RX ORDER — LISINOPRIL 10 MG/1
10 TABLET ORAL DAILY
Qty: 30 TABLET | Refills: 5 | Status: SHIPPED | OUTPATIENT
Start: 2024-12-16

## 2024-12-16 NOTE — PROGRESS NOTES
Rehab f/up (D/c from [facility] 12-9-2024)       HPI:  Siomara Collins is a 79 y.o. year old female who is here for a follow up visit for hospitalization transition of care.   She was last seen by me on 11/15/2024.     Discharged on: Admitted [facility] 11/22/2024 and discharged on 11/26/2024 to [facility] rehab where she was discharged from there on 12/9/2024.  She had recently had a hospitalization on 10/18 through 10/24 at [facility] being [facility] through 11/5    Diagnosis in hospital:    Bradycardia - 2nd degree AV block  2.    PMR (polymyalgia rheumatica) (HCC)  3.    Asthma/interstitial lung disease  4.    Urinary tract infection without hematuria  5.  Diabetes mellitus  6.  Hypertension on Coreg and Bumex at time of hospital discharge  7.  Hyperlipidemia  8 neuropathy  9 GERD  10 anxiety        Complications in hospital: No complications in the hospital    Medication changes: Changed to gabapentin dose to 300 mg nightly, discontinued Lexapro, quinapril, and doxycycline and at some point lisinopril was discontinued    Discharge Summary reviewed.  Today 12/16/2024      She reports the following: She feels like a completely new person that she has had a pacemaker placed.  She currently notes no chest pain, shortness of breath, palpitations, PND, orthopnea or other cardiac or respiratory complaints.  She notes no current GI or  complaints except she does have some burning with urination and think she has not a urine infection.  She notes no headaches, dizziness or neurologic complaints except for her generalized weakness which has improved a little since she has been in rehab but not enough that she is completely out of the wheelchair yet.  She notes no change of her chronic arthritic complaints and there are no other complaints on complete review of systems.          BP (!) 156/100   Pulse 88   Temp 98.6 °F (37 °C)   SpO2 95%     Historical Data    Past

## 2024-12-16 NOTE — PROGRESS NOTES
Siomara Collins is a 79 y.o. female     Chief Complaint   Patient presents with    Rehab f/up     D/c from [facility] 12-9-2024       \"Have you been to the ER, urgent care clinic since your last visit?  Hospitalized since your last visit?\"    NO    “Have you seen or consulted any other health care providers outside of [facility] since your last visit?”    Hospitalized 9-2-2024 at [facility]; then went to [facility] rehab.

## 2024-12-18 ENCOUNTER — APPOINTMENT (OUTPATIENT)
Facility: HOSPITAL | Age: 79
End: 2024-12-18
Payer: MEDICARE

## 2024-12-18 ENCOUNTER — HOSPITAL ENCOUNTER (EMERGENCY)
Facility: HOSPITAL | Age: 79
Discharge: HOME | End: 2024-12-18
Attending: EMERGENCY MEDICINE
Payer: MEDICARE

## 2024-12-18 VITALS
OXYGEN SATURATION: 99 % | HEIGHT: 63 IN | HEART RATE: 79 BPM | RESPIRATION RATE: 23 BRPM | DIASTOLIC BLOOD PRESSURE: 63 MMHG | WEIGHT: 200 LBS | BODY MASS INDEX: 35.44 KG/M2 | TEMPERATURE: 98.6 F | SYSTOLIC BLOOD PRESSURE: 129 MMHG

## 2024-12-18 DIAGNOSIS — R53.1 GENERAL WEAKNESS: Primary | ICD-10-CM

## 2024-12-18 DIAGNOSIS — J18.9 PNEUMONIA DUE TO INFECTIOUS ORGANISM, UNSPECIFIED LATERALITY, UNSPECIFIED PART OF LUNG: ICD-10-CM

## 2024-12-18 LAB
ALBUMIN SERPL-MCNC: 3 G/DL (ref 3.5–5)
ALBUMIN/GLOB SERPL: 0.9 (ref 1.1–2.2)
ALP SERPL-CCNC: 184 U/L (ref 45–117)
ALT SERPL-CCNC: 152 U/L (ref 12–78)
ANION GAP SERPL CALC-SCNC: 5 MMOL/L (ref 2–12)
AST SERPL-CCNC: 34 U/L (ref 15–37)
BASOPHILS # BLD: 0 K/UL (ref 0–0.1)
BASOPHILS NFR BLD: 0 % (ref 0–1)
BILIRUB SERPL-MCNC: 0.6 MG/DL (ref 0.2–1)
BUN SERPL-MCNC: 19 MG/DL (ref 6–20)
BUN/CREAT SERPL: 25 (ref 12–20)
CALCIUM SERPL-MCNC: 8.8 MG/DL (ref 8.5–10.1)
CHLORIDE SERPL-SCNC: 105 MMOL/L (ref 97–108)
CO2 SERPL-SCNC: 30 MMOL/L (ref 21–32)
CREAT SERPL-MCNC: 0.75 MG/DL (ref 0.55–1.02)
DIFFERENTIAL METHOD BLD: ABNORMAL
EKG ATRIAL RATE: 77 BPM
EKG DIAGNOSIS: NORMAL
EKG P AXIS: 23 DEGREES
EKG P-R INTERVAL: 190 MS
EKG Q-T INTERVAL: 452 MS
EKG QRS DURATION: 160 MS
EKG QTC CALCULATION (BAZETT): 511 MS
EKG R AXIS: 52 DEGREES
EKG T AXIS: 238 DEGREES
EKG VENTRICULAR RATE: 77 BPM
EOSINOPHIL # BLD: 0.1 K/UL (ref 0–0.4)
EOSINOPHIL NFR BLD: 1 % (ref 0–7)
ERYTHROCYTE [DISTWIDTH] IN BLOOD BY AUTOMATED COUNT: 14.4 % (ref 11.5–14.5)
GLOBULIN SER CALC-MCNC: 3.4 G/DL (ref 2–4)
GLUCOSE SERPL-MCNC: 172 MG/DL (ref 65–100)
HCT VFR BLD AUTO: 40.7 % (ref 35–47)
HGB BLD-MCNC: 13.7 G/DL (ref 11.5–16)
IMM GRANULOCYTES # BLD AUTO: 0.1 K/UL (ref 0–0.04)
IMM GRANULOCYTES NFR BLD AUTO: 1 % (ref 0–0.5)
LYMPHOCYTES # BLD: 0.7 K/UL (ref 0.8–3.5)
LYMPHOCYTES NFR BLD: 7 % (ref 12–49)
MCH RBC QN AUTO: 32.8 PG (ref 26–34)
MCHC RBC AUTO-ENTMCNC: 33.7 G/DL (ref 30–36.5)
MCV RBC AUTO: 97.4 FL (ref 80–99)
MONOCYTES # BLD: 0.5 K/UL (ref 0–1)
MONOCYTES NFR BLD: 5 % (ref 5–13)
NEUTS SEG # BLD: 8.6 K/UL (ref 1.8–8)
NEUTS SEG NFR BLD: 86 % (ref 32–75)
NRBC # BLD: 0 K/UL (ref 0–0.01)
NRBC BLD-RTO: 0 PER 100 WBC
PLATELET # BLD AUTO: 153 K/UL (ref 150–400)
PMV BLD AUTO: 10.6 FL (ref 8.9–12.9)
POTASSIUM SERPL-SCNC: 3.6 MMOL/L (ref 3.5–5.1)
PROT SERPL-MCNC: 6.4 G/DL (ref 6.4–8.2)
RBC # BLD AUTO: 4.18 M/UL (ref 3.8–5.2)
RBC MORPH BLD: ABNORMAL
SODIUM SERPL-SCNC: 140 MMOL/L (ref 136–145)
TROPONIN I SERPL HS-MCNC: 39 NG/L (ref 0–51)
WBC # BLD AUTO: 10 K/UL (ref 3.6–11)

## 2024-12-18 PROCEDURE — 85025 COMPLETE CBC W/AUTO DIFF WBC: CPT

## 2024-12-18 PROCEDURE — 70450 CT HEAD/BRAIN W/O DYE: CPT

## 2024-12-18 PROCEDURE — 80053 COMPREHEN METABOLIC PANEL: CPT

## 2024-12-18 PROCEDURE — 2580000003 HC RX 258: Performed by: EMERGENCY MEDICINE

## 2024-12-18 PROCEDURE — 96360 HYDRATION IV INFUSION INIT: CPT

## 2024-12-18 PROCEDURE — 84484 ASSAY OF TROPONIN QUANT: CPT

## 2024-12-18 PROCEDURE — 96361 HYDRATE IV INFUSION ADD-ON: CPT

## 2024-12-18 PROCEDURE — 71045 X-RAY EXAM CHEST 1 VIEW: CPT

## 2024-12-18 PROCEDURE — 36415 COLL VENOUS BLD VENIPUNCTURE: CPT

## 2024-12-18 PROCEDURE — 99285 EMERGENCY DEPT VISIT HI MDM: CPT

## 2024-12-18 RX ORDER — DOXYCYCLINE HYCLATE 100 MG
100 TABLET ORAL 2 TIMES DAILY
Qty: 20 TABLET | Refills: 0 | Status: ON HOLD | OUTPATIENT
Start: 2024-12-18 | End: 2024-12-28

## 2024-12-18 RX ORDER — 0.9 % SODIUM CHLORIDE 0.9 %
500 INTRAVENOUS SOLUTION INTRAVENOUS ONCE
Status: COMPLETED | OUTPATIENT
Start: 2024-12-18 | End: 2024-12-18

## 2024-12-18 RX ADMIN — SODIUM CHLORIDE 500 ML: 9 INJECTION, SOLUTION INTRAVENOUS at 14:31

## 2024-12-18 ASSESSMENT — PAIN - FUNCTIONAL ASSESSMENT
PAIN_FUNCTIONAL_ASSESSMENT: 0-10
PAIN_FUNCTIONAL_ASSESSMENT: NONE - DENIES PAIN

## 2024-12-18 ASSESSMENT — PAIN SCALES - GENERAL: PAINLEVEL_OUTOF10: 0

## 2024-12-18 NOTE — ED PROVIDER NOTES
[facility] EMERGENCY DEPT  EMERGENCY DEPARTMENT ENCOUNTER      Pt Name: Siomara Collins  MRN: [id number]  Birthdate 1945  Date of evaluation: 12/18/2024  Provider: Angeles Thurston DO    CHIEF COMPLAINT       Chief Complaint   Patient presents with    Blurred Vision    Fatigue         HISTORY OF PRESENT ILLNESS   (Location/Symptom, Timing/Onset, Context/Setting, Quality, Duration, Modifying Factors, Severity)  Note limiting factors.   HPI      Review of External Medical Records:     Nursing Notes were reviewed.    REVIEW OF SYSTEMS    (2-9 systems for level 4, 10 or more for level 5)     Review of Systems    Except as noted above the remainder of the review of systems was reviewed and negative.       PAST MEDICAL HISTORY     Past Medical History:   Diagnosis Date    Abnormal LFTs 08/24/2017    FATTY LIVER    Arthritis     OSTEO    Avascular necrosis of hip 08/24/2017    BILAT HIPS    Breast CA (HCC) 1989, 2001    BILATERAL; SURGERY, CHEMO    Chronic pain     CKD (chronic kidney disease), stage II 8/24/2017    Coagulation disorder ([facility]) 1984    ITP  (DR DC CAR) - PLATELETS DROPPED TO 5K    Depression     Diabetes ([facility]) 2012    TYPE 2; NIDDM    DJD (degenerative joint disease), multiple sites 8/24/2017    GI bleed 2008    HEMORRHOIDS    Hyperlipemia     Hypertension with renal disease 8/24/2017    IBS (irritable bowel syndrome) 8/24/2017    Ill-defined condition 2015    PNEUMONIA X2 - HOSPITALIZED IN 2015    Interstitial lung disease ([facility]) 04/01/2019    Liver disease     FATTY LIVER    Myalgia 8/24/2017    On statin therapy 8/24/2017    Overactive bladder 8/24/2017    Pneumonia 04/2015    HOSPITALIZED 3 WEEKS.    Polymyalgia rheumatica (HCC) 8/24/2017    Prophylactic antibiotic 8/24/2017    Reflex abnormality     acid reflex    Rosacea          SURGICAL HISTORY       Past Surgical History:   Procedure Laterality Date    APPENDECTOMY  1950    BREAST BIOPSY Bilateral     BREAST SURGERY  1993, 2002    RECONSTRUCTION  medications    Details   doxycycline hyclate (VIBRA-TABS) 100 MG tablet Take 1 tablet by mouth 2 times daily for 10 days, Disp-20 tablet, R-0Print               (Please note that portions of this note were completed with a voice recognition program.  Efforts were made to edit the dictations but occasionally words are mis-transcribed.)    Angeles Thurston DO (electronically signed)  Emergency Attending Physician / Physician Assistant / Nurse Practitioner             Angeles Thurston DO  12/21/24 0788

## 2024-12-18 NOTE — ED NOTES
Called [facility] [phone] for wheelchair mobility services, pt states she does not stop off at a pharmacy prior to going back to [facility], pt provided water/crackers

## 2024-12-18 NOTE — ED TRIAGE NOTES
Pt arrives to ED via EMS from [facility] where pt resides with complaints of blurred vision in left eye that started around 8am with \"generalized weakness\".  Pt noticed the vision change after working with PT.  Pt reports blood sugar was readying >500 yesterday and today is 267.  Pt hypotensive in triage and reports this to be \"normal for her\".  Pt also reports her PCP \"ordered a medication to increase BP but unsure if she has received it yet\".  Pt had pacemaker placed in November.  Pt denies any new pain other than her \"neuropathy\".  Pt does not ambulate at baseline and uses a wheelchair.  Pt denies CP, SOB, NVD.

## 2024-12-27 ENCOUNTER — APPOINTMENT (OUTPATIENT)
Facility: HOSPITAL | Age: 79
DRG: 871 | End: 2024-12-27
Payer: MEDICARE

## 2024-12-27 ENCOUNTER — HOSPITAL ENCOUNTER (INPATIENT)
Facility: HOSPITAL | Age: 79
LOS: 7 days | Discharge: NURSING, REHAB OR LONG-TERM CARE | DRG: 871 | End: 2025-01-03
Attending: EMERGENCY MEDICINE | Admitting: HOSPITALIST
Payer: MEDICARE

## 2024-12-27 DIAGNOSIS — R79.89 ELEVATED TROPONIN: ICD-10-CM

## 2024-12-27 DIAGNOSIS — R09.02 HYPOXIA: ICD-10-CM

## 2024-12-27 DIAGNOSIS — R65.20 SEPSIS WITH ACUTE ORGAN DYSFUNCTION WITHOUT SEPTIC SHOCK, DUE TO UNSPECIFIED ORGANISM, UNSPECIFIED ORGAN DYSFUNCTION TYPE (HCC): Primary | ICD-10-CM

## 2024-12-27 DIAGNOSIS — A41.9 SEPSIS WITH ACUTE ORGAN DYSFUNCTION WITHOUT SEPTIC SHOCK, DUE TO UNSPECIFIED ORGANISM, UNSPECIFIED ORGAN DYSFUNCTION TYPE (HCC): Primary | ICD-10-CM

## 2024-12-27 DIAGNOSIS — R78.81 BACTEREMIA: ICD-10-CM

## 2024-12-27 PROBLEM — I95.9 HYPOTENSION: Status: ACTIVE | Noted: 2024-12-27

## 2024-12-27 LAB
ALBUMIN SERPL-MCNC: 2.7 G/DL (ref 3.5–5)
ALBUMIN/GLOB SERPL: 0.9 (ref 1.1–2.2)
ALP SERPL-CCNC: 164 U/L (ref 45–117)
ALT SERPL-CCNC: 243 U/L (ref 12–78)
ANION GAP SERPL CALC-SCNC: 5 MMOL/L (ref 2–12)
APPEARANCE UR: CLEAR
AST SERPL-CCNC: 245 U/L (ref 15–37)
BACTERIA URNS QL MICRO: ABNORMAL /HPF
BASOPHILS # BLD: 0 K/UL (ref 0–0.1)
BASOPHILS NFR BLD: 0 % (ref 0–1)
BILIRUB SERPL-MCNC: 3.2 MG/DL (ref 0.2–1)
BILIRUB UR QL: NEGATIVE
BUN SERPL-MCNC: 21 MG/DL (ref 6–20)
BUN/CREAT SERPL: 27 (ref 12–20)
CALCIUM SERPL-MCNC: 8.9 MG/DL (ref 8.5–10.1)
CHLORIDE SERPL-SCNC: 104 MMOL/L (ref 97–108)
CO2 SERPL-SCNC: 30 MMOL/L (ref 21–32)
COLOR UR: ABNORMAL
COMMENT:: NORMAL
CREAT SERPL-MCNC: 0.77 MG/DL (ref 0.55–1.02)
DIFFERENTIAL METHOD BLD: ABNORMAL
EOSINOPHIL # BLD: 0 K/UL (ref 0–0.4)
EOSINOPHIL NFR BLD: 0 % (ref 0–7)
EPITH CASTS URNS QL MICRO: ABNORMAL /LPF
ERYTHROCYTE [DISTWIDTH] IN BLOOD BY AUTOMATED COUNT: 14.9 % (ref 11.5–14.5)
FLUAV RNA SPEC QL NAA+PROBE: NOT DETECTED
FLUBV RNA SPEC QL NAA+PROBE: NOT DETECTED
GLOBULIN SER CALC-MCNC: 2.9 G/DL (ref 2–4)
GLUCOSE BLD STRIP.AUTO-MCNC: 196 MG/DL (ref 65–117)
GLUCOSE BLD STRIP.AUTO-MCNC: 224 MG/DL (ref 65–117)
GLUCOSE BLD STRIP.AUTO-MCNC: 254 MG/DL (ref 65–117)
GLUCOSE SERPL-MCNC: 171 MG/DL (ref 65–100)
GLUCOSE UR STRIP.AUTO-MCNC: 250 MG/DL
HCT VFR BLD AUTO: 39.5 % (ref 35–47)
HGB BLD-MCNC: 13.5 G/DL (ref 11.5–16)
HGB UR QL STRIP: NEGATIVE
IMM GRANULOCYTES # BLD AUTO: 0.1 K/UL (ref 0–0.04)
IMM GRANULOCYTES NFR BLD AUTO: 1 % (ref 0–0.5)
KETONES UR QL STRIP.AUTO: NEGATIVE MG/DL
LACTATE BLD-SCNC: 1.8 MMOL/L (ref 0.4–2)
LEUKOCYTE ESTERASE UR QL STRIP.AUTO: ABNORMAL
LYMPHOCYTES # BLD: 0.5 K/UL (ref 0.8–3.5)
LYMPHOCYTES NFR BLD: 4 % (ref 12–49)
MCH RBC QN AUTO: 33 PG (ref 26–34)
MCHC RBC AUTO-ENTMCNC: 34.2 G/DL (ref 30–36.5)
MCV RBC AUTO: 96.6 FL (ref 80–99)
MONOCYTES # BLD: 1 K/UL (ref 0–1)
MONOCYTES NFR BLD: 8 % (ref 5–13)
NEUTS SEG # BLD: 10.7 K/UL (ref 1.8–8)
NEUTS SEG NFR BLD: 87 % (ref 32–75)
NITRITE UR QL STRIP.AUTO: NEGATIVE
NRBC # BLD: 0 K/UL (ref 0–0.01)
NRBC BLD-RTO: 0 PER 100 WBC
PH UR STRIP: 7.5 (ref 5–8)
PLATELET # BLD AUTO: 152 K/UL (ref 150–400)
PMV BLD AUTO: 10.3 FL (ref 8.9–12.9)
POTASSIUM SERPL-SCNC: 3.8 MMOL/L (ref 3.5–5.1)
PROCALCITONIN SERPL-MCNC: 1.35 NG/ML
PROT SERPL-MCNC: 5.6 G/DL (ref 6.4–8.2)
PROT UR STRIP-MCNC: NEGATIVE MG/DL
RBC # BLD AUTO: 4.09 M/UL (ref 3.8–5.2)
RBC #/AREA URNS HPF: ABNORMAL /HPF (ref 0–5)
RBC MORPH BLD: ABNORMAL
SARS-COV-2 RNA RESP QL NAA+PROBE: NOT DETECTED
SERVICE CMNT-IMP: ABNORMAL
SODIUM SERPL-SCNC: 139 MMOL/L (ref 136–145)
SOURCE: NORMAL
SP GR UR REFRACTOMETRY: 1.01 (ref 1–1.03)
SPECIMEN HOLD: NORMAL
TROPONIN I SERPL HS-MCNC: 52 NG/L (ref 0–51)
TROPONIN I SERPL HS-MCNC: 64 NG/L (ref 0–51)
TROPONIN I SERPL HS-MCNC: 74 NG/L (ref 0–51)
URINE CULTURE IF INDICATED: ABNORMAL
UROBILINOGEN UR QL STRIP.AUTO: 1 EU/DL (ref 0.2–1)
WBC # BLD AUTO: 12.3 K/UL (ref 3.6–11)
WBC URNS QL MICRO: ABNORMAL /HPF (ref 0–4)

## 2024-12-27 PROCEDURE — 2580000003 HC RX 258: Performed by: HOSPITALIST

## 2024-12-27 PROCEDURE — 2580000003 HC RX 258: Performed by: EMERGENCY MEDICINE

## 2024-12-27 PROCEDURE — 6360000002 HC RX W HCPCS: Performed by: EMERGENCY MEDICINE

## 2024-12-27 PROCEDURE — 84145 PROCALCITONIN (PCT): CPT

## 2024-12-27 PROCEDURE — 71045 X-RAY EXAM CHEST 1 VIEW: CPT

## 2024-12-27 PROCEDURE — 2060000000 HC ICU INTERMEDIATE R&B

## 2024-12-27 PROCEDURE — 96365 THER/PROPH/DIAG IV INF INIT: CPT

## 2024-12-27 PROCEDURE — 94640 AIRWAY INHALATION TREATMENT: CPT

## 2024-12-27 PROCEDURE — 83690 ASSAY OF LIPASE: CPT

## 2024-12-27 PROCEDURE — 80074 ACUTE HEPATITIS PANEL: CPT

## 2024-12-27 PROCEDURE — 6360000004 HC RX CONTRAST MEDICATION: Performed by: HOSPITALIST

## 2024-12-27 PROCEDURE — 82962 GLUCOSE BLOOD TEST: CPT

## 2024-12-27 PROCEDURE — 94761 N-INVAS EAR/PLS OXIMETRY MLT: CPT

## 2024-12-27 PROCEDURE — 76705 ECHO EXAM OF ABDOMEN: CPT

## 2024-12-27 PROCEDURE — 87040 BLOOD CULTURE FOR BACTERIA: CPT

## 2024-12-27 PROCEDURE — 87077 CULTURE AEROBIC IDENTIFY: CPT

## 2024-12-27 PROCEDURE — 71260 CT THORAX DX C+: CPT

## 2024-12-27 PROCEDURE — 87636 SARSCOV2 & INF A&B AMP PRB: CPT

## 2024-12-27 PROCEDURE — 6370000000 HC RX 637 (ALT 250 FOR IP): Performed by: HOSPITALIST

## 2024-12-27 PROCEDURE — 85025 COMPLETE CBC W/AUTO DIFF WBC: CPT

## 2024-12-27 PROCEDURE — 87186 SC STD MICRODIL/AGAR DIL: CPT

## 2024-12-27 PROCEDURE — 87088 URINE BACTERIA CULTURE: CPT

## 2024-12-27 PROCEDURE — 80053 COMPREHEN METABOLIC PANEL: CPT

## 2024-12-27 PROCEDURE — 36415 COLL VENOUS BLD VENIPUNCTURE: CPT

## 2024-12-27 PROCEDURE — 81001 URINALYSIS AUTO W/SCOPE: CPT

## 2024-12-27 PROCEDURE — 99285 EMERGENCY DEPT VISIT HI MDM: CPT

## 2024-12-27 PROCEDURE — 84484 ASSAY OF TROPONIN QUANT: CPT

## 2024-12-27 PROCEDURE — 87086 URINE CULTURE/COLONY COUNT: CPT

## 2024-12-27 PROCEDURE — 83605 ASSAY OF LACTIC ACID: CPT

## 2024-12-27 RX ORDER — ONDANSETRON 2 MG/ML
4 INJECTION INTRAMUSCULAR; INTRAVENOUS EVERY 6 HOURS PRN
Status: DISCONTINUED | OUTPATIENT
Start: 2024-12-27 | End: 2025-01-03 | Stop reason: HOSPADM

## 2024-12-27 RX ORDER — PANTOPRAZOLE SODIUM 40 MG/1
40 TABLET, DELAYED RELEASE ORAL
Status: DISCONTINUED | OUTPATIENT
Start: 2024-12-28 | End: 2025-01-03 | Stop reason: HOSPADM

## 2024-12-27 RX ORDER — DEXTROSE MONOHYDRATE 100 MG/ML
INJECTION, SOLUTION INTRAVENOUS CONTINUOUS PRN
Status: DISCONTINUED | OUTPATIENT
Start: 2024-12-27 | End: 2025-01-03 | Stop reason: HOSPADM

## 2024-12-27 RX ORDER — IPRATROPIUM BROMIDE AND ALBUTEROL SULFATE 2.5; .5 MG/3ML; MG/3ML
1 SOLUTION RESPIRATORY (INHALATION)
Status: DISCONTINUED | OUTPATIENT
Start: 2024-12-27 | End: 2024-12-28

## 2024-12-27 RX ORDER — INSULIN LISPRO 100 [IU]/ML
0-8 INJECTION, SOLUTION INTRAVENOUS; SUBCUTANEOUS
Status: DISCONTINUED | OUTPATIENT
Start: 2024-12-27 | End: 2025-01-03 | Stop reason: HOSPADM

## 2024-12-27 RX ORDER — IOPAMIDOL 755 MG/ML
100 INJECTION, SOLUTION INTRAVASCULAR
Status: COMPLETED | OUTPATIENT
Start: 2024-12-27 | End: 2024-12-27

## 2024-12-27 RX ORDER — MONTELUKAST SODIUM 10 MG/1
10 TABLET ORAL NIGHTLY
Status: DISCONTINUED | OUTPATIENT
Start: 2024-12-27 | End: 2025-01-03 | Stop reason: HOSPADM

## 2024-12-27 RX ORDER — ONDANSETRON 2 MG/ML
4 INJECTION INTRAMUSCULAR; INTRAVENOUS ONCE
Status: DISCONTINUED | OUTPATIENT
Start: 2024-12-27 | End: 2025-01-01

## 2024-12-27 RX ORDER — SODIUM CHLORIDE, SODIUM LACTATE, POTASSIUM CHLORIDE, CALCIUM CHLORIDE 600; 310; 30; 20 MG/100ML; MG/100ML; MG/100ML; MG/100ML
INJECTION, SOLUTION INTRAVENOUS CONTINUOUS
Status: DISCONTINUED | OUTPATIENT
Start: 2024-12-27 | End: 2024-12-28

## 2024-12-27 RX ORDER — EZETIMIBE 10 MG/1
10 TABLET ORAL NIGHTLY
Status: DISCONTINUED | OUTPATIENT
Start: 2024-12-27 | End: 2025-01-03 | Stop reason: HOSPADM

## 2024-12-27 RX ORDER — INSULIN GLARGINE 100 [IU]/ML
16 INJECTION, SOLUTION SUBCUTANEOUS DAILY
Status: DISCONTINUED | OUTPATIENT
Start: 2024-12-28 | End: 2024-12-29

## 2024-12-27 RX ORDER — ESCITALOPRAM OXALATE 10 MG/1
10 TABLET ORAL DAILY
Status: DISCONTINUED | OUTPATIENT
Start: 2024-12-28 | End: 2025-01-03 | Stop reason: HOSPADM

## 2024-12-27 RX ORDER — AZITHROMYCIN 250 MG/1
500 TABLET, FILM COATED ORAL DAILY
Status: DISCONTINUED | OUTPATIENT
Start: 2024-12-27 | End: 2024-12-29

## 2024-12-27 RX ORDER — ENOXAPARIN SODIUM 100 MG/ML
40 INJECTION SUBCUTANEOUS DAILY
Status: DISCONTINUED | OUTPATIENT
Start: 2024-12-27 | End: 2025-01-03 | Stop reason: HOSPADM

## 2024-12-27 RX ORDER — 0.9 % SODIUM CHLORIDE 0.9 %
30 INTRAVENOUS SOLUTION INTRAVENOUS ONCE
Status: COMPLETED | OUTPATIENT
Start: 2024-12-27 | End: 2024-12-27

## 2024-12-27 RX ORDER — QUETIAPINE FUMARATE 25 MG/1
25 TABLET, FILM COATED ORAL 2 TIMES DAILY
Status: DISCONTINUED | OUTPATIENT
Start: 2024-12-27 | End: 2024-12-28

## 2024-12-27 RX ORDER — PRIMIDONE 50 MG/1
50 TABLET ORAL 2 TIMES DAILY
Status: DISCONTINUED | OUTPATIENT
Start: 2024-12-27 | End: 2025-01-03 | Stop reason: HOSPADM

## 2024-12-27 RX ORDER — INSULIN GLARGINE 100 [IU]/ML
8 INJECTION, SOLUTION SUBCUTANEOUS DAILY
Status: DISCONTINUED | OUTPATIENT
Start: 2024-12-27 | End: 2024-12-27

## 2024-12-27 RX ADMIN — IPRATROPIUM BROMIDE AND ALBUTEROL SULFATE 1 DOSE: 2.5; .5 SOLUTION RESPIRATORY (INHALATION) at 21:30

## 2024-12-27 RX ADMIN — MONTELUKAST 10 MG: 10 TABLET, FILM COATED ORAL at 21:55

## 2024-12-27 RX ADMIN — IOPAMIDOL 100 ML: 755 INJECTION, SOLUTION INTRAVENOUS at 18:51

## 2024-12-27 RX ADMIN — MEROPENEM 1000 MG: 1 INJECTION INTRAVENOUS at 16:56

## 2024-12-27 RX ADMIN — SODIUM CHLORIDE 2805 ML: 9 INJECTION, SOLUTION INTRAVENOUS at 15:05

## 2024-12-27 RX ADMIN — QUETIAPINE FUMARATE 25 MG: 25 TABLET ORAL at 21:56

## 2024-12-27 RX ADMIN — SODIUM CHLORIDE, POTASSIUM CHLORIDE, SODIUM LACTATE AND CALCIUM CHLORIDE: 600; 310; 30; 20 INJECTION, SOLUTION INTRAVENOUS at 22:09

## 2024-12-27 RX ADMIN — DICLOFENAC SODIUM 2 G: 10 GEL TOPICAL at 22:00

## 2024-12-27 RX ADMIN — EZETIMIBE 10 MG: 10 TABLET ORAL at 21:56

## 2024-12-27 RX ADMIN — PRIMIDONE 50 MG: 50 TABLET ORAL at 21:56

## 2024-12-27 RX ADMIN — INSULIN LISPRO 4 UNITS: 100 INJECTION, SOLUTION INTRAVENOUS; SUBCUTANEOUS at 21:56

## 2024-12-27 ASSESSMENT — PAIN SCALES - GENERAL: PAINLEVEL_OUTOF10: 6

## 2024-12-27 ASSESSMENT — PAIN - FUNCTIONAL ASSESSMENT: PAIN_FUNCTIONAL_ASSESSMENT: 0-10

## 2024-12-27 ASSESSMENT — PAIN DESCRIPTION - LOCATION: LOCATION: BACK;LEG

## 2024-12-27 NOTE — ED PROVIDER NOTES
interpreted by the emergency physician with the below findings:        Interpretation per the Radiologist below, if available at the time of this note:    XR CHEST 1 VIEW    (Results Pending)         ED BEDSIDE ULTRASOUND:   Performed by ED Physician - none    LABS:  Labs Reviewed   POCT GLUCOSE - Abnormal; Notable for the following components:       Result Value    POC Glucose 196 (*)     All other components within normal limits   CULTURE, BLOOD 1   CULTURE, BLOOD 2   COVID-19 & INFLUENZA COMBO   EXTRA TUBES HOLD   CBC WITH AUTO DIFFERENTIAL   COMPREHENSIVE METABOLIC PANEL   PROCALCITONIN   TROPONIN   URINALYSIS WITH REFLEX TO CULTURE   POCT LACTIC ACID   POCT LACTIC ACID   POCT GLUCOSE       All other labs were within normal range or not returned as of this dictation.    EMERGENCY DEPARTMENT COURSE and DIFFERENTIAL DIAGNOSIS/MDM:   Vitals:    Vitals:    12/27/24 1401 12/27/24 1405 12/27/24 1413   BP: 98/61  (!) 86/59   Pulse: (!) 106     Resp: 16     Temp: 98.6 °F (37 °C)     TempSrc: Oral     SpO2: (!) 88% 95%    Weight: 93.5 kg (206 lb 3.2 oz)     Height: 1.6 m (5' 3\")             Medical Decision Making  Amount and/or Complexity of Data Reviewed  Labs: ordered.  Radiology: ordered.  ECG/medicine tests: ordered.    Risk  Prescription drug management.  Decision regarding hospitalization.    This is a 79-year-old female with past medical history, review of systems, physical exam as above presenting with complaints of nausea.  Patient states symptoms of nausea and fatigue since late yesterday evening.  She denies vomiting, fevers, acknowledges she is taking antibiotic for urinary tract infection.  She is unclear as to which antibiotic she is taking.  She states she has a recurrent issue with urinary tract infections.  Upon arrival she is noted to have soft blood pressures, afebrile, tachycardic, hypoxic on room air.  She notes a history of pulmonary fibrosis.  Exam is remarkable for ill-appearing elderly female,

## 2024-12-27 NOTE — ED TRIAGE NOTES
Pt arrives via EMS from [facility]. CC N/ since 0900 today denies v/. Assisted living facility told EMS they gave her med for n/ but didn't tell them what it was.    PMH- DM, 11/25/2024 pacemaker surgery, back fusion, hip replacement, COPD, HTN

## 2024-12-27 NOTE — ED TRIAGE NOTES
Verbal report given to Sunshine(name) on Siomara Collins being transferred to  330(unit) for routine progression of patient care    Report consisted of patient's Situation, Background, Assessment and Recommendations (SBAR)    Information from the following report(s)  ED Encounter Report, ED SBAR, MAR, and Recent Results was reviewed with the receiving nurse.    Opportunity for questions and clarification was provided.    Patient transported with:  Monitor and O2 @ 1lpm    Last Filed Values:  Vitals:    12/27/24 1700   BP: 131/70   Pulse: 84   Resp: 20   Temp:    SpO2: 99%          WBC   Date Value Ref Range Status   12/27/2024 12.3 (H) 3.6 - 11.0 K/uL Final   12/18/2024 10.0 3.6 - 11.0 K/uL Final   11/24/2024 7.9 3.6 - 11.0 K/uL Final        Blood Cultures Drawn:  Yes    Initial Lactic Acid (LA):  Time 1434,  Result 1.8    Repeat LA:  Time Due N/A, Done & Result N/A    Fluid Restriction:  Total needed 2,805mL, Status infusing    All Antibiotics Started:  Yes, Dose Due 1525    VS x 2 post-fluid resuscitation:   N/A still infusing    Vasopressor Infusion:  No Other N/A    Provider Reassessment needed and notified:  Yes, Due 1434    Additional Interventions/Comments:  N/A

## 2024-12-28 LAB
-: NORMAL
ALBUMIN SERPL-MCNC: 2.4 G/DL (ref 3.5–5)
ALBUMIN/GLOB SERPL: 0.9 (ref 1.1–2.2)
ALP SERPL-CCNC: 143 U/L (ref 45–117)
ALT SERPL-CCNC: 209 U/L (ref 12–78)
ANION GAP SERPL CALC-SCNC: 4 MMOL/L (ref 2–12)
AST SERPL-CCNC: 140 U/L (ref 15–37)
BASOPHILS # BLD: 0 K/UL (ref 0–0.1)
BASOPHILS NFR BLD: 0 % (ref 0–1)
BILIRUB SERPL-MCNC: 1.4 MG/DL (ref 0.2–1)
BUN SERPL-MCNC: 19 MG/DL (ref 6–20)
BUN/CREAT SERPL: 29 (ref 12–20)
CALCIUM SERPL-MCNC: 8.6 MG/DL (ref 8.5–10.1)
CHLORIDE SERPL-SCNC: 107 MMOL/L (ref 97–108)
CO2 SERPL-SCNC: 30 MMOL/L (ref 21–32)
CREAT SERPL-MCNC: 0.66 MG/DL (ref 0.55–1.02)
DIFFERENTIAL METHOD BLD: ABNORMAL
EOSINOPHIL # BLD: 0 K/UL (ref 0–0.4)
EOSINOPHIL NFR BLD: 0 % (ref 0–7)
ERYTHROCYTE [DISTWIDTH] IN BLOOD BY AUTOMATED COUNT: 15.2 % (ref 11.5–14.5)
GLOBULIN SER CALC-MCNC: 2.8 G/DL (ref 2–4)
GLUCOSE BLD STRIP.AUTO-MCNC: 246 MG/DL (ref 65–117)
GLUCOSE BLD STRIP.AUTO-MCNC: 317 MG/DL (ref 65–117)
GLUCOSE BLD STRIP.AUTO-MCNC: 324 MG/DL (ref 65–117)
GLUCOSE BLD STRIP.AUTO-MCNC: 335 MG/DL (ref 65–117)
GLUCOSE SERPL-MCNC: 250 MG/DL (ref 65–100)
HAV IGM SER QL: NONREACTIVE
HBV CORE IGM SER QL: NONREACTIVE
HBV SURFACE AG SER QL: <0.1 INDEX
HBV SURFACE AG SER QL: NEGATIVE
HCT VFR BLD AUTO: 35.6 % (ref 35–47)
HCV AB SER IA-ACNC: 0.2 INDEX
HCV AB SERPL QL IA: NONREACTIVE
HGB BLD-MCNC: 11.6 G/DL (ref 11.5–16)
IMM GRANULOCYTES # BLD AUTO: 0.1 K/UL (ref 0–0.04)
IMM GRANULOCYTES NFR BLD AUTO: 1 % (ref 0–0.5)
LIPASE SERPL-CCNC: 49 U/L (ref 13–75)
LYMPHOCYTES # BLD: 0.3 K/UL (ref 0.8–3.5)
LYMPHOCYTES NFR BLD: 4 % (ref 12–49)
MCH RBC QN AUTO: 31.7 PG (ref 26–34)
MCHC RBC AUTO-ENTMCNC: 32.6 G/DL (ref 30–36.5)
MCV RBC AUTO: 97.3 FL (ref 80–99)
MONOCYTES # BLD: 0.4 K/UL (ref 0–1)
MONOCYTES NFR BLD: 5 % (ref 5–13)
NEUTS SEG # BLD: 6.6 K/UL (ref 1.8–8)
NEUTS SEG NFR BLD: 90 % (ref 32–75)
NRBC # BLD: 0 K/UL (ref 0–0.01)
NRBC BLD-RTO: 0 PER 100 WBC
PLATELET # BLD AUTO: 135 K/UL (ref 150–400)
PMV BLD AUTO: 10.5 FL (ref 8.9–12.9)
POTASSIUM SERPL-SCNC: 3.6 MMOL/L (ref 3.5–5.1)
PROT SERPL-MCNC: 5.2 G/DL (ref 6.4–8.2)
RBC # BLD AUTO: 3.66 M/UL (ref 3.8–5.2)
RBC MORPH BLD: ABNORMAL
SERVICE CMNT-IMP: ABNORMAL
SODIUM SERPL-SCNC: 141 MMOL/L (ref 136–145)
TROPONIN I SERPL HS-MCNC: 32 NG/L (ref 0–51)
WBC # BLD AUTO: 7.4 K/UL (ref 3.6–11)

## 2024-12-28 PROCEDURE — 6370000000 HC RX 637 (ALT 250 FOR IP): Performed by: STUDENT IN AN ORGANIZED HEALTH CARE EDUCATION/TRAINING PROGRAM

## 2024-12-28 PROCEDURE — 6360000002 HC RX W HCPCS: Performed by: HOSPITALIST

## 2024-12-28 PROCEDURE — 2060000000 HC ICU INTERMEDIATE R&B

## 2024-12-28 PROCEDURE — 94761 N-INVAS EAR/PLS OXIMETRY MLT: CPT

## 2024-12-28 PROCEDURE — 36415 COLL VENOUS BLD VENIPUNCTURE: CPT

## 2024-12-28 PROCEDURE — 2700000000 HC OXYGEN THERAPY PER DAY

## 2024-12-28 PROCEDURE — 2500000003 HC RX 250 WO HCPCS: Performed by: STUDENT IN AN ORGANIZED HEALTH CARE EDUCATION/TRAINING PROGRAM

## 2024-12-28 PROCEDURE — 6370000000 HC RX 637 (ALT 250 FOR IP): Performed by: HOSPITALIST

## 2024-12-28 PROCEDURE — 85025 COMPLETE CBC W/AUTO DIFF WBC: CPT

## 2024-12-28 PROCEDURE — 2500000003 HC RX 250 WO HCPCS: Performed by: HOSPITALIST

## 2024-12-28 PROCEDURE — 82962 GLUCOSE BLOOD TEST: CPT

## 2024-12-28 PROCEDURE — 80053 COMPREHEN METABOLIC PANEL: CPT

## 2024-12-28 PROCEDURE — 84484 ASSAY OF TROPONIN QUANT: CPT

## 2024-12-28 PROCEDURE — 6360000002 HC RX W HCPCS: Performed by: STUDENT IN AN ORGANIZED HEALTH CARE EDUCATION/TRAINING PROGRAM

## 2024-12-28 RX ORDER — IPRATROPIUM BROMIDE AND ALBUTEROL SULFATE 2.5; .5 MG/3ML; MG/3ML
1 SOLUTION RESPIRATORY (INHALATION) EVERY 4 HOURS PRN
Status: DISCONTINUED | OUTPATIENT
Start: 2024-12-28 | End: 2025-01-03 | Stop reason: HOSPADM

## 2024-12-28 RX ORDER — GABAPENTIN 300 MG/1
300 CAPSULE ORAL NIGHTLY
Status: DISCONTINUED | OUTPATIENT
Start: 2024-12-29 | End: 2025-01-03 | Stop reason: HOSPADM

## 2024-12-28 RX ORDER — BUMETANIDE 1 MG/1
1 TABLET ORAL DAILY
Status: DISCONTINUED | OUTPATIENT
Start: 2024-12-28 | End: 2025-01-03 | Stop reason: HOSPADM

## 2024-12-28 RX ORDER — INSULIN LISPRO 100 [IU]/ML
15 INJECTION, SOLUTION INTRAVENOUS; SUBCUTANEOUS
Status: DISCONTINUED | OUTPATIENT
Start: 2024-12-28 | End: 2024-12-30

## 2024-12-28 RX ORDER — LISINOPRIL 5 MG/1
10 TABLET ORAL DAILY
Status: DISCONTINUED | OUTPATIENT
Start: 2024-12-28 | End: 2025-01-03 | Stop reason: HOSPADM

## 2024-12-28 RX ORDER — CARVEDILOL 6.25 MG/1
6.25 TABLET ORAL 2 TIMES DAILY WITH MEALS
Status: DISCONTINUED | OUTPATIENT
Start: 2024-12-28 | End: 2025-01-03 | Stop reason: HOSPADM

## 2024-12-28 RX ORDER — ROSUVASTATIN CALCIUM 10 MG/1
20 TABLET, COATED ORAL NIGHTLY
Status: DISCONTINUED | OUTPATIENT
Start: 2024-12-28 | End: 2025-01-03 | Stop reason: HOSPADM

## 2024-12-28 RX ADMIN — INSULIN LISPRO 15 UNITS: 100 INJECTION, SOLUTION INTRAVENOUS; SUBCUTANEOUS at 17:48

## 2024-12-28 RX ADMIN — INSULIN LISPRO 6 UNITS: 100 INJECTION, SOLUTION INTRAVENOUS; SUBCUTANEOUS at 17:48

## 2024-12-28 RX ADMIN — ESCITALOPRAM OXALATE 10 MG: 10 TABLET ORAL at 08:55

## 2024-12-28 RX ADMIN — WATER 40 MG: 1 INJECTION INTRAMUSCULAR; INTRAVENOUS; SUBCUTANEOUS at 17:49

## 2024-12-28 RX ADMIN — INSULIN LISPRO 6 UNITS: 100 INJECTION, SOLUTION INTRAVENOUS; SUBCUTANEOUS at 12:11

## 2024-12-28 RX ADMIN — WATER 40 MG: 1 INJECTION INTRAMUSCULAR; INTRAVENOUS; SUBCUTANEOUS at 05:31

## 2024-12-28 RX ADMIN — MONTELUKAST 10 MG: 10 TABLET, FILM COATED ORAL at 20:40

## 2024-12-28 RX ADMIN — QUETIAPINE FUMARATE 25 MG: 25 TABLET ORAL at 08:55

## 2024-12-28 RX ADMIN — DICLOFENAC SODIUM 2 G: 10 GEL TOPICAL at 17:50

## 2024-12-28 RX ADMIN — EZETIMIBE 10 MG: 10 TABLET ORAL at 20:40

## 2024-12-28 RX ADMIN — DICLOFENAC SODIUM 2 G: 10 GEL TOPICAL at 20:40

## 2024-12-28 RX ADMIN — AZITHROMYCIN DIHYDRATE 500 MG: 250 TABLET, FILM COATED ORAL at 08:55

## 2024-12-28 RX ADMIN — DICLOFENAC SODIUM 2 G: 10 GEL TOPICAL at 08:55

## 2024-12-28 RX ADMIN — INSULIN LISPRO 6 UNITS: 100 INJECTION, SOLUTION INTRAVENOUS; SUBCUTANEOUS at 22:32

## 2024-12-28 RX ADMIN — INSULIN LISPRO 2 UNITS: 100 INJECTION, SOLUTION INTRAVENOUS; SUBCUTANEOUS at 08:54

## 2024-12-28 RX ADMIN — INSULIN GLARGINE 16 UNITS: 100 INJECTION, SOLUTION SUBCUTANEOUS at 08:54

## 2024-12-28 RX ADMIN — PRIMIDONE 50 MG: 50 TABLET ORAL at 08:55

## 2024-12-28 RX ADMIN — WATER 1000 MG: 1 INJECTION INTRAMUSCULAR; INTRAVENOUS; SUBCUTANEOUS at 17:50

## 2024-12-28 RX ADMIN — PANTOPRAZOLE SODIUM 40 MG: 40 TABLET, DELAYED RELEASE ORAL at 05:31

## 2024-12-28 RX ADMIN — PRIMIDONE 50 MG: 50 TABLET ORAL at 20:40

## 2024-12-28 ASSESSMENT — PAIN SCALES - GENERAL: PAINLEVEL_OUTOF10: 0

## 2024-12-28 NOTE — H&P
of DM. She is on Low dose Lantus  and NPH. I have increased Lantus dose and started on SSI.         MD GEOVANI FRAZIER/IRMA  D:  12/27/2024 17:29:08  T:  12/27/2024 22:42:15  JOB #:  [id number]

## 2024-12-29 PROBLEM — D72.829 LEUKOCYTOSIS: Status: ACTIVE | Noted: 2024-12-29

## 2024-12-29 PROBLEM — Z88.1 ALLERGY TO MULTIPLE ANTIBIOTICS: Status: ACTIVE | Noted: 2024-12-29

## 2024-12-29 PROBLEM — R78.81 BACTEREMIA: Status: ACTIVE | Noted: 2024-12-29

## 2024-12-29 PROBLEM — N18.2 CKD (CHRONIC KIDNEY DISEASE) STAGE 2, GFR 60-89 ML/MIN: Status: ACTIVE | Noted: 2024-12-29

## 2024-12-29 PROBLEM — R74.8 ELEVATED LIVER ENZYMES: Status: ACTIVE | Noted: 2024-12-29

## 2024-12-29 PROBLEM — Z95.0 PACEMAKER: Status: ACTIVE | Noted: 2024-12-29

## 2024-12-29 LAB
ALBUMIN SERPL-MCNC: 2.6 G/DL (ref 3.5–5)
ALBUMIN/GLOB SERPL: 0.8 (ref 1.1–2.2)
ALP SERPL-CCNC: 152 U/L (ref 45–117)
ALT SERPL-CCNC: 167 U/L (ref 12–78)
ANION GAP SERPL CALC-SCNC: 3 MMOL/L (ref 2–12)
AST SERPL-CCNC: 53 U/L (ref 15–37)
BASOPHILS # BLD: 0 K/UL (ref 0–0.1)
BASOPHILS NFR BLD: 0 % (ref 0–1)
BILIRUB DIRECT SERPL-MCNC: 0.5 MG/DL (ref 0–0.2)
BILIRUB SERPL-MCNC: 0.8 MG/DL (ref 0.2–1)
BUN SERPL-MCNC: 25 MG/DL (ref 6–20)
BUN/CREAT SERPL: 36 (ref 12–20)
CALCIUM SERPL-MCNC: 8.8 MG/DL (ref 8.5–10.1)
CHLORIDE SERPL-SCNC: 106 MMOL/L (ref 97–108)
CO2 SERPL-SCNC: 29 MMOL/L (ref 21–32)
CREAT SERPL-MCNC: 0.69 MG/DL (ref 0.55–1.02)
DIFFERENTIAL METHOD BLD: ABNORMAL
EOSINOPHIL # BLD: 0 K/UL (ref 0–0.4)
EOSINOPHIL NFR BLD: 0 % (ref 0–7)
ERYTHROCYTE [DISTWIDTH] IN BLOOD BY AUTOMATED COUNT: 14.7 % (ref 11.5–14.5)
GLOBULIN SER CALC-MCNC: 3.2 G/DL (ref 2–4)
GLUCOSE BLD STRIP.AUTO-MCNC: 264 MG/DL (ref 65–117)
GLUCOSE BLD STRIP.AUTO-MCNC: 268 MG/DL (ref 65–117)
GLUCOSE BLD STRIP.AUTO-MCNC: 293 MG/DL (ref 65–117)
GLUCOSE BLD STRIP.AUTO-MCNC: 293 MG/DL (ref 65–117)
GLUCOSE SERPL-MCNC: 291 MG/DL (ref 65–100)
HCT VFR BLD AUTO: 36.9 % (ref 35–47)
HGB BLD-MCNC: 12.3 G/DL (ref 11.5–16)
IMM GRANULOCYTES # BLD AUTO: 0 K/UL (ref 0–0.04)
IMM GRANULOCYTES NFR BLD AUTO: 0 % (ref 0–0.5)
LYMPHOCYTES # BLD: 0.5 K/UL (ref 0.8–3.5)
LYMPHOCYTES NFR BLD: 5 % (ref 12–49)
MCH RBC QN AUTO: 32.1 PG (ref 26–34)
MCHC RBC AUTO-ENTMCNC: 33.3 G/DL (ref 30–36.5)
MCV RBC AUTO: 96.3 FL (ref 80–99)
MONOCYTES # BLD: 0.5 K/UL (ref 0–1)
MONOCYTES NFR BLD: 5 % (ref 5–13)
NEUTS SEG # BLD: 8.1 K/UL (ref 1.8–8)
NEUTS SEG NFR BLD: 90 % (ref 32–75)
NRBC # BLD: 0 K/UL (ref 0–0.01)
NRBC BLD-RTO: 0 PER 100 WBC
PLATELET # BLD AUTO: 147 K/UL (ref 150–400)
PMV BLD AUTO: 9.7 FL (ref 8.9–12.9)
POTASSIUM SERPL-SCNC: 4.1 MMOL/L (ref 3.5–5.1)
PROT SERPL-MCNC: 5.8 G/DL (ref 6.4–8.2)
RBC # BLD AUTO: 3.83 M/UL (ref 3.8–5.2)
RBC MORPH BLD: ABNORMAL
SERVICE CMNT-IMP: ABNORMAL
SODIUM SERPL-SCNC: 138 MMOL/L (ref 136–145)
TROPONIN I SERPL HS-MCNC: 31 NG/L (ref 0–51)
WBC # BLD AUTO: 9.1 K/UL (ref 3.6–11)

## 2024-12-29 PROCEDURE — 97535 SELF CARE MNGMENT TRAINING: CPT

## 2024-12-29 PROCEDURE — 2500000003 HC RX 250 WO HCPCS: Performed by: STUDENT IN AN ORGANIZED HEALTH CARE EDUCATION/TRAINING PROGRAM

## 2024-12-29 PROCEDURE — 2060000000 HC ICU INTERMEDIATE R&B

## 2024-12-29 PROCEDURE — 94761 N-INVAS EAR/PLS OXIMETRY MLT: CPT

## 2024-12-29 PROCEDURE — 85025 COMPLETE CBC W/AUTO DIFF WBC: CPT

## 2024-12-29 PROCEDURE — 99223 1ST HOSP IP/OBS HIGH 75: CPT | Performed by: INTERNAL MEDICINE

## 2024-12-29 PROCEDURE — 6370000000 HC RX 637 (ALT 250 FOR IP): Performed by: HOSPITALIST

## 2024-12-29 PROCEDURE — 97165 OT EVAL LOW COMPLEX 30 MIN: CPT

## 2024-12-29 PROCEDURE — 80076 HEPATIC FUNCTION PANEL: CPT

## 2024-12-29 PROCEDURE — 82962 GLUCOSE BLOOD TEST: CPT

## 2024-12-29 PROCEDURE — 87040 BLOOD CULTURE FOR BACTERIA: CPT

## 2024-12-29 PROCEDURE — 2580000003 HC RX 258: Performed by: STUDENT IN AN ORGANIZED HEALTH CARE EDUCATION/TRAINING PROGRAM

## 2024-12-29 PROCEDURE — 80048 BASIC METABOLIC PNL TOTAL CA: CPT

## 2024-12-29 PROCEDURE — 36415 COLL VENOUS BLD VENIPUNCTURE: CPT

## 2024-12-29 PROCEDURE — 6360000002 HC RX W HCPCS: Performed by: STUDENT IN AN ORGANIZED HEALTH CARE EDUCATION/TRAINING PROGRAM

## 2024-12-29 PROCEDURE — 6370000000 HC RX 637 (ALT 250 FOR IP): Performed by: STUDENT IN AN ORGANIZED HEALTH CARE EDUCATION/TRAINING PROGRAM

## 2024-12-29 PROCEDURE — 97530 THERAPEUTIC ACTIVITIES: CPT

## 2024-12-29 PROCEDURE — 84484 ASSAY OF TROPONIN QUANT: CPT

## 2024-12-29 PROCEDURE — 97161 PT EVAL LOW COMPLEX 20 MIN: CPT

## 2024-12-29 RX ORDER — INSULIN GLARGINE 100 [IU]/ML
20 INJECTION, SOLUTION SUBCUTANEOUS DAILY
Status: DISCONTINUED | OUTPATIENT
Start: 2024-12-29 | End: 2024-12-30

## 2024-12-29 RX ORDER — LIDOCAINE 4 G/G
1 PATCH TOPICAL DAILY
Status: DISCONTINUED | OUTPATIENT
Start: 2024-12-29 | End: 2025-01-03 | Stop reason: HOSPADM

## 2024-12-29 RX ORDER — ACETAMINOPHEN 325 MG/1
650 TABLET ORAL EVERY 4 HOURS PRN
Status: DISCONTINUED | OUTPATIENT
Start: 2024-12-29 | End: 2025-01-03 | Stop reason: HOSPADM

## 2024-12-29 RX ADMIN — INSULIN LISPRO 4 UNITS: 100 INJECTION, SOLUTION INTRAVENOUS; SUBCUTANEOUS at 13:01

## 2024-12-29 RX ADMIN — MONTELUKAST 10 MG: 10 TABLET, FILM COATED ORAL at 21:09

## 2024-12-29 RX ADMIN — INSULIN LISPRO 4 UNITS: 100 INJECTION, SOLUTION INTRAVENOUS; SUBCUTANEOUS at 09:01

## 2024-12-29 RX ADMIN — GABAPENTIN 300 MG: 300 CAPSULE ORAL at 21:09

## 2024-12-29 RX ADMIN — CARVEDILOL 6.25 MG: 6.25 TABLET, FILM COATED ORAL at 17:22

## 2024-12-29 RX ADMIN — PRIMIDONE 50 MG: 50 TABLET ORAL at 21:09

## 2024-12-29 RX ADMIN — ESCITALOPRAM OXALATE 10 MG: 10 TABLET ORAL at 08:59

## 2024-12-29 RX ADMIN — INSULIN LISPRO 15 UNITS: 100 INJECTION, SOLUTION INTRAVENOUS; SUBCUTANEOUS at 17:23

## 2024-12-29 RX ADMIN — INSULIN LISPRO 15 UNITS: 100 INJECTION, SOLUTION INTRAVENOUS; SUBCUTANEOUS at 13:01

## 2024-12-29 RX ADMIN — DICLOFENAC SODIUM 2 G: 10 GEL TOPICAL at 09:00

## 2024-12-29 RX ADMIN — INSULIN LISPRO 4 UNITS: 100 INJECTION, SOLUTION INTRAVENOUS; SUBCUTANEOUS at 21:17

## 2024-12-29 RX ADMIN — INSULIN LISPRO 4 UNITS: 100 INJECTION, SOLUTION INTRAVENOUS; SUBCUTANEOUS at 17:23

## 2024-12-29 RX ADMIN — DICLOFENAC SODIUM 2 G: 10 GEL TOPICAL at 21:10

## 2024-12-29 RX ADMIN — VANCOMYCIN HYDROCHLORIDE 2250 MG: 10 INJECTION, POWDER, LYOPHILIZED, FOR SOLUTION INTRAVENOUS at 09:58

## 2024-12-29 RX ADMIN — INSULIN LISPRO 15 UNITS: 100 INJECTION, SOLUTION INTRAVENOUS; SUBCUTANEOUS at 09:01

## 2024-12-29 RX ADMIN — DICLOFENAC SODIUM 2 G: 10 GEL TOPICAL at 13:01

## 2024-12-29 RX ADMIN — WATER 2000 MG: 1 INJECTION INTRAMUSCULAR; INTRAVENOUS; SUBCUTANEOUS at 17:21

## 2024-12-29 RX ADMIN — ACETAMINOPHEN 650 MG: 325 TABLET ORAL at 21:09

## 2024-12-29 RX ADMIN — AZITHROMYCIN DIHYDRATE 500 MG: 250 TABLET, FILM COATED ORAL at 08:59

## 2024-12-29 RX ADMIN — PREDNISONE 30 MG: 10 TABLET ORAL at 08:59

## 2024-12-29 RX ADMIN — ACETAMINOPHEN 650 MG: 325 TABLET ORAL at 10:02

## 2024-12-29 RX ADMIN — EZETIMIBE 10 MG: 10 TABLET ORAL at 21:09

## 2024-12-29 RX ADMIN — PRIMIDONE 50 MG: 50 TABLET ORAL at 08:59

## 2024-12-29 RX ADMIN — DICLOFENAC SODIUM 2 G: 10 GEL TOPICAL at 17:22

## 2024-12-29 RX ADMIN — INSULIN HUMAN 55 UNITS: 100 INJECTION, SUSPENSION SUBCUTANEOUS at 09:00

## 2024-12-29 RX ADMIN — INSULIN GLARGINE 20 UNITS: 100 INJECTION, SOLUTION SUBCUTANEOUS at 09:00

## 2024-12-29 RX ADMIN — PANTOPRAZOLE SODIUM 40 MG: 40 TABLET, DELAYED RELEASE ORAL at 05:45

## 2024-12-29 ASSESSMENT — PAIN DESCRIPTION - PAIN TYPE: TYPE: ACUTE PAIN

## 2024-12-29 ASSESSMENT — PAIN SCALES - GENERAL
PAINLEVEL_OUTOF10: 0
PAINLEVEL_OUTOF10: 3
PAINLEVEL_OUTOF10: 6
PAINLEVEL_OUTOF10: 0
PAINLEVEL_OUTOF10: 0

## 2024-12-29 ASSESSMENT — PAIN DESCRIPTION - DESCRIPTORS: DESCRIPTORS: ACHING

## 2024-12-29 ASSESSMENT — PAIN - FUNCTIONAL ASSESSMENT: PAIN_FUNCTIONAL_ASSESSMENT: ACTIVITIES ARE NOT PREVENTED

## 2024-12-29 ASSESSMENT — PAIN DESCRIPTION - LOCATION: LOCATION: LEG

## 2024-12-29 ASSESSMENT — PAIN DESCRIPTION - ORIENTATION: ORIENTATION: RIGHT;LEFT

## 2024-12-30 ENCOUNTER — APPOINTMENT (OUTPATIENT)
Facility: HOSPITAL | Age: 79
DRG: 871 | End: 2024-12-30
Attending: STUDENT IN AN ORGANIZED HEALTH CARE EDUCATION/TRAINING PROGRAM
Payer: MEDICARE

## 2024-12-30 PROBLEM — B95.2 BACTEREMIA DUE TO ENTEROCOCCUS: Status: ACTIVE | Noted: 2024-12-30

## 2024-12-30 PROBLEM — R78.81 BACTEREMIA DUE TO ENTEROCOCCUS: Status: ACTIVE | Noted: 2024-12-30

## 2024-12-30 LAB
ALBUMIN SERPL-MCNC: 2.4 G/DL (ref 3.5–5)
ALBUMIN/GLOB SERPL: 0.8 (ref 1.1–2.2)
ALP SERPL-CCNC: 135 U/L (ref 45–117)
ALT SERPL-CCNC: 134 U/L (ref 12–78)
ANION GAP SERPL CALC-SCNC: 4 MMOL/L (ref 2–12)
AST SERPL-CCNC: 48 U/L (ref 15–37)
BACTERIA SPEC CULT: ABNORMAL
BILIRUB DIRECT SERPL-MCNC: 0.2 MG/DL (ref 0–0.2)
BILIRUB SERPL-MCNC: 1 MG/DL (ref 0.2–1)
BUN SERPL-MCNC: 21 MG/DL (ref 6–20)
BUN/CREAT SERPL: 40 (ref 12–20)
CALCIUM SERPL-MCNC: 8.9 MG/DL (ref 8.5–10.1)
CC UR VC: ABNORMAL
CHLORIDE SERPL-SCNC: 109 MMOL/L (ref 97–108)
CO2 SERPL-SCNC: 26 MMOL/L (ref 21–32)
CREAT SERPL-MCNC: 0.53 MG/DL (ref 0.55–1.02)
ECHO AV AREA PEAK VELOCITY: 1.1 CM2
ECHO AV AREA VTI: 1.1 CM2
ECHO AV AREA/BSA PEAK VELOCITY: 0.6 CM2/M2
ECHO AV AREA/BSA VTI: 0.6 CM2/M2
ECHO AV MEAN GRADIENT: 13 MMHG
ECHO AV MEAN VELOCITY: 1.7 M/S
ECHO AV PEAK GRADIENT: 23 MMHG
ECHO AV PEAK VELOCITY: 2.4 M/S
ECHO AV VELOCITY RATIO: 0.29
ECHO AV VTI: 47.8 CM
ECHO BSA: 2.04 M2
ECHO LV EDV A2C: 66 ML
ECHO LV EDV A4C: 119 ML
ECHO LV EDV BP: 97 ML (ref 56–104)
ECHO LV EDV INDEX A4C: 61 ML/M2
ECHO LV EDV INDEX BP: 49 ML/M2
ECHO LV EDV NDEX A2C: 34 ML/M2
ECHO LV EJECTION FRACTION A2C: 45 %
ECHO LV EJECTION FRACTION A4C: 42 %
ECHO LV EJECTION FRACTION BIPLANE: 44 % (ref 55–100)
ECHO LV ESV A2C: 36 ML
ECHO LV ESV A4C: 69 ML
ECHO LV ESV BP: 54 ML (ref 19–49)
ECHO LV ESV INDEX A2C: 18 ML/M2
ECHO LV ESV INDEX A4C: 35 ML/M2
ECHO LV ESV INDEX BP: 28 ML/M2
ECHO LV FRACTIONAL SHORTENING: 18 % (ref 28–44)
ECHO LV INTERNAL DIMENSION DIASTOLE INDEX: 2.3 CM/M2
ECHO LV INTERNAL DIMENSION DIASTOLIC: 4.5 CM (ref 3.9–5.3)
ECHO LV INTERNAL DIMENSION SYSTOLIC INDEX: 1.89 CM/M2
ECHO LV INTERNAL DIMENSION SYSTOLIC: 3.7 CM
ECHO LV IVSD: 0.9 CM (ref 0.6–0.9)
ECHO LV MASS 2D: 142.9 G (ref 67–162)
ECHO LV MASS INDEX 2D: 72.9 G/M2 (ref 43–95)
ECHO LV POSTERIOR WALL DIASTOLIC: 1 CM (ref 0.6–0.9)
ECHO LV RELATIVE WALL THICKNESS RATIO: 0.44
ECHO LVOT AREA: 3.5 CM2
ECHO LVOT AV VTI INDEX: 0.31
ECHO LVOT DIAM: 2.1 CM
ECHO LVOT MEAN GRADIENT: 1 MMHG
ECHO LVOT PEAK GRADIENT: 2 MMHG
ECHO LVOT PEAK VELOCITY: 0.7 M/S
ECHO LVOT STROKE VOLUME INDEX: 26.3 ML/M2
ECHO LVOT SV: 51.6 ML
ECHO LVOT VTI: 14.9 CM
ECHO MV AREA VTI: 2 CM2
ECHO MV LVOT VTI INDEX: 1.74
ECHO MV MAX VELOCITY: 1.1 M/S
ECHO MV MEAN GRADIENT: 3 MMHG
ECHO MV MEAN VELOCITY: 0.8 M/S
ECHO MV PEAK GRADIENT: 5 MMHG
ECHO MV REGURGITANT PEAK GRADIENT: 67 MMHG
ECHO MV REGURGITANT PEAK VELOCITY: 4.1 M/S
ECHO MV VTI: 26 CM
GLOBULIN SER CALC-MCNC: 3 G/DL (ref 2–4)
GLUCOSE BLD STRIP.AUTO-MCNC: 114 MG/DL (ref 65–117)
GLUCOSE BLD STRIP.AUTO-MCNC: 151 MG/DL (ref 65–117)
GLUCOSE BLD STRIP.AUTO-MCNC: 208 MG/DL (ref 65–117)
GLUCOSE BLD STRIP.AUTO-MCNC: 348 MG/DL (ref 65–117)
GLUCOSE BLD STRIP.AUTO-MCNC: 67 MG/DL (ref 65–117)
GLUCOSE SERPL-MCNC: 121 MG/DL (ref 65–100)
POTASSIUM SERPL-SCNC: 4.2 MMOL/L (ref 3.5–5.1)
PROT SERPL-MCNC: 5.4 G/DL (ref 6.4–8.2)
SERVICE CMNT-IMP: ABNORMAL
SERVICE CMNT-IMP: NORMAL
SERVICE CMNT-IMP: NORMAL
SODIUM SERPL-SCNC: 139 MMOL/L (ref 136–145)

## 2024-12-30 PROCEDURE — 2580000003 HC RX 258: Performed by: STUDENT IN AN ORGANIZED HEALTH CARE EDUCATION/TRAINING PROGRAM

## 2024-12-30 PROCEDURE — 99233 SBSQ HOSP IP/OBS HIGH 50: CPT | Performed by: INTERNAL MEDICINE

## 2024-12-30 PROCEDURE — 97530 THERAPEUTIC ACTIVITIES: CPT

## 2024-12-30 PROCEDURE — 93308 TTE F-UP OR LMTD: CPT | Performed by: INTERNAL MEDICINE

## 2024-12-30 PROCEDURE — 36415 COLL VENOUS BLD VENIPUNCTURE: CPT

## 2024-12-30 PROCEDURE — 99221 1ST HOSP IP/OBS SF/LOW 40: CPT

## 2024-12-30 PROCEDURE — 93308 TTE F-UP OR LMTD: CPT

## 2024-12-30 PROCEDURE — 6370000000 HC RX 637 (ALT 250 FOR IP)

## 2024-12-30 PROCEDURE — 82962 GLUCOSE BLOOD TEST: CPT

## 2024-12-30 PROCEDURE — 6370000000 HC RX 637 (ALT 250 FOR IP): Performed by: STUDENT IN AN ORGANIZED HEALTH CARE EDUCATION/TRAINING PROGRAM

## 2024-12-30 PROCEDURE — 6370000000 HC RX 637 (ALT 250 FOR IP): Performed by: HOSPITALIST

## 2024-12-30 PROCEDURE — 6360000002 HC RX W HCPCS: Performed by: STUDENT IN AN ORGANIZED HEALTH CARE EDUCATION/TRAINING PROGRAM

## 2024-12-30 PROCEDURE — 93321 DOPPLER ECHO F-UP/LMTD STD: CPT | Performed by: INTERNAL MEDICINE

## 2024-12-30 PROCEDURE — 93325 DOPPLER ECHO COLOR FLOW MAPG: CPT | Performed by: INTERNAL MEDICINE

## 2024-12-30 PROCEDURE — 80076 HEPATIC FUNCTION PANEL: CPT

## 2024-12-30 PROCEDURE — 2060000000 HC ICU INTERMEDIATE R&B

## 2024-12-30 PROCEDURE — 94761 N-INVAS EAR/PLS OXIMETRY MLT: CPT

## 2024-12-30 PROCEDURE — 80048 BASIC METABOLIC PNL TOTAL CA: CPT

## 2024-12-30 RX ORDER — INSULIN GLARGINE 100 [IU]/ML
20 INJECTION, SOLUTION SUBCUTANEOUS NIGHTLY
Status: DISCONTINUED | OUTPATIENT
Start: 2024-12-30 | End: 2025-01-01

## 2024-12-30 RX ORDER — INSULIN LISPRO 100 [IU]/ML
10 INJECTION, SOLUTION INTRAVENOUS; SUBCUTANEOUS
Status: DISCONTINUED | OUTPATIENT
Start: 2024-12-30 | End: 2025-01-02

## 2024-12-30 RX ADMIN — ACETAMINOPHEN 650 MG: 325 TABLET ORAL at 21:45

## 2024-12-30 RX ADMIN — VANCOMYCIN HYDROCHLORIDE 1250 MG: 1.25 INJECTION, POWDER, LYOPHILIZED, FOR SOLUTION INTRAVENOUS at 06:18

## 2024-12-30 RX ADMIN — DICLOFENAC SODIUM 2 G: 10 GEL TOPICAL at 10:13

## 2024-12-30 RX ADMIN — DICLOFENAC SODIUM 2 G: 10 GEL TOPICAL at 14:25

## 2024-12-30 RX ADMIN — ESCITALOPRAM OXALATE 10 MG: 10 TABLET ORAL at 09:57

## 2024-12-30 RX ADMIN — CARVEDILOL 6.25 MG: 6.25 TABLET, FILM COATED ORAL at 09:58

## 2024-12-30 RX ADMIN — PANTOPRAZOLE SODIUM 40 MG: 40 TABLET, DELAYED RELEASE ORAL at 06:24

## 2024-12-30 RX ADMIN — INSULIN LISPRO 10 UNITS: 100 INJECTION, SOLUTION INTRAVENOUS; SUBCUTANEOUS at 17:59

## 2024-12-30 RX ADMIN — DICLOFENAC SODIUM 2 G: 10 GEL TOPICAL at 21:56

## 2024-12-30 RX ADMIN — INSULIN GLARGINE 20 UNITS: 100 INJECTION, SOLUTION SUBCUTANEOUS at 21:45

## 2024-12-30 RX ADMIN — INSULIN LISPRO 6 UNITS: 100 INJECTION, SOLUTION INTRAVENOUS; SUBCUTANEOUS at 18:00

## 2024-12-30 RX ADMIN — PREDNISONE 30 MG: 10 TABLET ORAL at 09:57

## 2024-12-30 RX ADMIN — EZETIMIBE 10 MG: 10 TABLET ORAL at 21:47

## 2024-12-30 RX ADMIN — PRIMIDONE 50 MG: 50 TABLET ORAL at 21:45

## 2024-12-30 RX ADMIN — CARVEDILOL 6.25 MG: 6.25 TABLET, FILM COATED ORAL at 17:59

## 2024-12-30 RX ADMIN — VANCOMYCIN HYDROCHLORIDE 1250 MG: 1.25 INJECTION, POWDER, LYOPHILIZED, FOR SOLUTION INTRAVENOUS at 23:44

## 2024-12-30 RX ADMIN — MONTELUKAST 10 MG: 10 TABLET, FILM COATED ORAL at 21:47

## 2024-12-30 RX ADMIN — GABAPENTIN 300 MG: 300 CAPSULE ORAL at 21:47

## 2024-12-30 RX ADMIN — ACETAMINOPHEN 650 MG: 325 TABLET ORAL at 15:07

## 2024-12-30 RX ADMIN — DICLOFENAC SODIUM 2 G: 10 GEL TOPICAL at 18:00

## 2024-12-30 RX ADMIN — PRIMIDONE 50 MG: 50 TABLET ORAL at 09:57

## 2024-12-30 ASSESSMENT — PAIN DESCRIPTION - FREQUENCY: FREQUENCY: INTERMITTENT

## 2024-12-30 ASSESSMENT — PAIN SCALES - GENERAL
PAINLEVEL_OUTOF10: 5
PAINLEVEL_OUTOF10: 5
PAINLEVEL_OUTOF10: 0
PAINLEVEL_OUTOF10: 3
PAINLEVEL_OUTOF10: 6
PAINLEVEL_OUTOF10: 5
PAINLEVEL_OUTOF10: 0

## 2024-12-30 ASSESSMENT — PAIN DESCRIPTION - ORIENTATION
ORIENTATION: RIGHT;LEFT
ORIENTATION: RIGHT;LEFT
ORIENTATION: LOWER
ORIENTATION: RIGHT;LEFT
ORIENTATION: RIGHT;LEFT

## 2024-12-30 ASSESSMENT — PAIN DESCRIPTION - LOCATION
LOCATION: LEG
LOCATION: BACK
LOCATION: LEG

## 2024-12-30 ASSESSMENT — PAIN DESCRIPTION - DESCRIPTORS
DESCRIPTORS: ACHING;BURNING
DESCRIPTORS: BURNING;ACHING
DESCRIPTORS: BURNING;ACHING;TINGLING
DESCRIPTORS: ACHING;BURNING
DESCRIPTORS: ACHING

## 2024-12-30 ASSESSMENT — PAIN DESCRIPTION - ONSET: ONSET: GRADUAL

## 2024-12-30 ASSESSMENT — PAIN DESCRIPTION - PAIN TYPE: TYPE: ACUTE PAIN

## 2024-12-30 ASSESSMENT — PAIN - FUNCTIONAL ASSESSMENT: PAIN_FUNCTIONAL_ASSESSMENT: ACTIVITIES ARE NOT PREVENTED

## 2024-12-31 LAB
ALBUMIN SERPL-MCNC: 2.4 G/DL (ref 3.5–5)
ALBUMIN/GLOB SERPL: 0.8 (ref 1.1–2.2)
ALP SERPL-CCNC: 145 U/L (ref 45–117)
ALT SERPL-CCNC: 115 U/L (ref 12–78)
ANION GAP SERPL CALC-SCNC: 5 MMOL/L (ref 2–12)
AST SERPL-CCNC: 34 U/L (ref 15–37)
BASOPHILS # BLD: 0 K/UL (ref 0–0.1)
BASOPHILS NFR BLD: 0 % (ref 0–1)
BILIRUB DIRECT SERPL-MCNC: 0.3 MG/DL (ref 0–0.2)
BILIRUB SERPL-MCNC: 1 MG/DL (ref 0.2–1)
BUN SERPL-MCNC: 16 MG/DL (ref 6–20)
BUN/CREAT SERPL: 24 (ref 12–20)
CALCIUM SERPL-MCNC: 8.6 MG/DL (ref 8.5–10.1)
CHLORIDE SERPL-SCNC: 106 MMOL/L (ref 97–108)
CO2 SERPL-SCNC: 27 MMOL/L (ref 21–32)
CREAT SERPL-MCNC: 0.66 MG/DL (ref 0.55–1.02)
DIFFERENTIAL METHOD BLD: ABNORMAL
EOSINOPHIL # BLD: 0 K/UL (ref 0–0.4)
EOSINOPHIL NFR BLD: 1 % (ref 0–7)
ERYTHROCYTE [DISTWIDTH] IN BLOOD BY AUTOMATED COUNT: 15.1 % (ref 11.5–14.5)
GLOBULIN SER CALC-MCNC: 3.2 G/DL (ref 2–4)
GLUCOSE BLD STRIP.AUTO-MCNC: 145 MG/DL (ref 65–117)
GLUCOSE BLD STRIP.AUTO-MCNC: 194 MG/DL (ref 65–117)
GLUCOSE BLD STRIP.AUTO-MCNC: 269 MG/DL (ref 65–117)
GLUCOSE BLD STRIP.AUTO-MCNC: 354 MG/DL (ref 65–117)
GLUCOSE SERPL-MCNC: 181 MG/DL (ref 65–100)
HCT VFR BLD AUTO: 37.4 % (ref 35–47)
HGB BLD-MCNC: 12.6 G/DL (ref 11.5–16)
IMM GRANULOCYTES # BLD AUTO: 0 K/UL (ref 0–0.04)
IMM GRANULOCYTES NFR BLD AUTO: 1 % (ref 0–0.5)
LYMPHOCYTES # BLD: 0.9 K/UL (ref 0.8–3.5)
LYMPHOCYTES NFR BLD: 16 % (ref 12–49)
MCH RBC QN AUTO: 32.6 PG (ref 26–34)
MCHC RBC AUTO-ENTMCNC: 33.7 G/DL (ref 30–36.5)
MCV RBC AUTO: 96.6 FL (ref 80–99)
MONOCYTES # BLD: 0.4 K/UL (ref 0–1)
MONOCYTES NFR BLD: 7 % (ref 5–13)
NEUTS SEG # BLD: 4.2 K/UL (ref 1.8–8)
NEUTS SEG NFR BLD: 75 % (ref 32–75)
NRBC # BLD: 0 K/UL (ref 0–0.01)
NRBC BLD-RTO: 0 PER 100 WBC
PLATELET # BLD AUTO: 142 K/UL (ref 150–400)
PMV BLD AUTO: 9.7 FL (ref 8.9–12.9)
POTASSIUM SERPL-SCNC: 3.8 MMOL/L (ref 3.5–5.1)
PROT SERPL-MCNC: 5.6 G/DL (ref 6.4–8.2)
RBC # BLD AUTO: 3.87 M/UL (ref 3.8–5.2)
SERVICE CMNT-IMP: ABNORMAL
SODIUM SERPL-SCNC: 138 MMOL/L (ref 136–145)
TROPONIN I SERPL HS-MCNC: 27 NG/L (ref 0–51)
WBC # BLD AUTO: 5.5 K/UL (ref 3.6–11)

## 2024-12-31 PROCEDURE — 99232 SBSQ HOSP IP/OBS MODERATE 35: CPT | Performed by: INTERNAL MEDICINE

## 2024-12-31 PROCEDURE — 6370000000 HC RX 637 (ALT 250 FOR IP): Performed by: STUDENT IN AN ORGANIZED HEALTH CARE EDUCATION/TRAINING PROGRAM

## 2024-12-31 PROCEDURE — 80048 BASIC METABOLIC PNL TOTAL CA: CPT

## 2024-12-31 PROCEDURE — 97530 THERAPEUTIC ACTIVITIES: CPT

## 2024-12-31 PROCEDURE — 2060000000 HC ICU INTERMEDIATE R&B

## 2024-12-31 PROCEDURE — 36415 COLL VENOUS BLD VENIPUNCTURE: CPT

## 2024-12-31 PROCEDURE — 80076 HEPATIC FUNCTION PANEL: CPT

## 2024-12-31 PROCEDURE — 97535 SELF CARE MNGMENT TRAINING: CPT

## 2024-12-31 PROCEDURE — 94761 N-INVAS EAR/PLS OXIMETRY MLT: CPT

## 2024-12-31 PROCEDURE — 2580000003 HC RX 258: Performed by: STUDENT IN AN ORGANIZED HEALTH CARE EDUCATION/TRAINING PROGRAM

## 2024-12-31 PROCEDURE — 6360000002 HC RX W HCPCS: Performed by: STUDENT IN AN ORGANIZED HEALTH CARE EDUCATION/TRAINING PROGRAM

## 2024-12-31 PROCEDURE — 84484 ASSAY OF TROPONIN QUANT: CPT

## 2024-12-31 PROCEDURE — 6370000000 HC RX 637 (ALT 250 FOR IP)

## 2024-12-31 PROCEDURE — 85025 COMPLETE CBC W/AUTO DIFF WBC: CPT

## 2024-12-31 PROCEDURE — 6370000000 HC RX 637 (ALT 250 FOR IP): Performed by: HOSPITALIST

## 2024-12-31 PROCEDURE — 82962 GLUCOSE BLOOD TEST: CPT

## 2024-12-31 RX ADMIN — ESCITALOPRAM OXALATE 10 MG: 10 TABLET ORAL at 10:04

## 2024-12-31 RX ADMIN — INSULIN HUMAN 28 UNITS: 100 INJECTION, SUSPENSION SUBCUTANEOUS at 10:03

## 2024-12-31 RX ADMIN — INSULIN LISPRO 10 UNITS: 100 INJECTION, SOLUTION INTRAVENOUS; SUBCUTANEOUS at 14:00

## 2024-12-31 RX ADMIN — CARVEDILOL 6.25 MG: 6.25 TABLET, FILM COATED ORAL at 10:04

## 2024-12-31 RX ADMIN — ACETAMINOPHEN 650 MG: 325 TABLET ORAL at 20:59

## 2024-12-31 RX ADMIN — GABAPENTIN 300 MG: 300 CAPSULE ORAL at 20:59

## 2024-12-31 RX ADMIN — PRIMIDONE 50 MG: 50 TABLET ORAL at 10:04

## 2024-12-31 RX ADMIN — INSULIN GLARGINE 20 UNITS: 100 INJECTION, SOLUTION SUBCUTANEOUS at 21:00

## 2024-12-31 RX ADMIN — INSULIN LISPRO 2 UNITS: 100 INJECTION, SOLUTION INTRAVENOUS; SUBCUTANEOUS at 14:00

## 2024-12-31 RX ADMIN — DICLOFENAC SODIUM 2 G: 10 GEL TOPICAL at 21:00

## 2024-12-31 RX ADMIN — INSULIN LISPRO 10 UNITS: 100 INJECTION, SOLUTION INTRAVENOUS; SUBCUTANEOUS at 17:37

## 2024-12-31 RX ADMIN — EZETIMIBE 10 MG: 10 TABLET ORAL at 21:00

## 2024-12-31 RX ADMIN — DICLOFENAC SODIUM 2 G: 10 GEL TOPICAL at 17:38

## 2024-12-31 RX ADMIN — CARVEDILOL 6.25 MG: 6.25 TABLET, FILM COATED ORAL at 17:37

## 2024-12-31 RX ADMIN — DICLOFENAC SODIUM 2 G: 10 GEL TOPICAL at 10:05

## 2024-12-31 RX ADMIN — PRIMIDONE 50 MG: 50 TABLET ORAL at 21:00

## 2024-12-31 RX ADMIN — DICLOFENAC SODIUM 2 G: 10 GEL TOPICAL at 14:01

## 2024-12-31 RX ADMIN — PREDNISONE 30 MG: 10 TABLET ORAL at 10:04

## 2024-12-31 RX ADMIN — MONTELUKAST 10 MG: 10 TABLET, FILM COATED ORAL at 21:00

## 2024-12-31 RX ADMIN — PANTOPRAZOLE SODIUM 40 MG: 40 TABLET, DELAYED RELEASE ORAL at 05:56

## 2024-12-31 RX ADMIN — INSULIN LISPRO 4 UNITS: 100 INJECTION, SOLUTION INTRAVENOUS; SUBCUTANEOUS at 22:39

## 2024-12-31 RX ADMIN — INSULIN LISPRO 8 UNITS: 100 INJECTION, SOLUTION INTRAVENOUS; SUBCUTANEOUS at 17:38

## 2024-12-31 RX ADMIN — VANCOMYCIN HYDROCHLORIDE 1250 MG: 1.25 INJECTION, POWDER, LYOPHILIZED, FOR SOLUTION INTRAVENOUS at 17:35

## 2024-12-31 ASSESSMENT — PAIN DESCRIPTION - ORIENTATION: ORIENTATION: RIGHT;LEFT

## 2024-12-31 ASSESSMENT — PAIN SCALES - GENERAL
PAINLEVEL_OUTOF10: 0
PAINLEVEL_OUTOF10: 7
PAINLEVEL_OUTOF10: 5
PAINLEVEL_OUTOF10: 6

## 2024-12-31 ASSESSMENT — PAIN DESCRIPTION - LOCATION: LOCATION: LEG

## 2025-01-01 ENCOUNTER — RESULTS FOLLOW-UP (OUTPATIENT)
Age: 80
End: 2025-01-01

## 2025-01-01 LAB
GLUCOSE BLD STRIP.AUTO-MCNC: 136 MG/DL (ref 65–117)
GLUCOSE BLD STRIP.AUTO-MCNC: 227 MG/DL (ref 65–117)
GLUCOSE BLD STRIP.AUTO-MCNC: 254 MG/DL (ref 65–117)
GLUCOSE BLD STRIP.AUTO-MCNC: 324 MG/DL (ref 65–117)
GLUCOSE BLD STRIP.AUTO-MCNC: 91 MG/DL (ref 65–117)
SERVICE CMNT-IMP: ABNORMAL
SERVICE CMNT-IMP: NORMAL
VANCOMYCIN SERPL-MCNC: 15.7 UG/ML

## 2025-01-01 PROCEDURE — 36415 COLL VENOUS BLD VENIPUNCTURE: CPT

## 2025-01-01 PROCEDURE — 2580000003 HC RX 258: Performed by: STUDENT IN AN ORGANIZED HEALTH CARE EDUCATION/TRAINING PROGRAM

## 2025-01-01 PROCEDURE — 6360000002 HC RX W HCPCS: Performed by: STUDENT IN AN ORGANIZED HEALTH CARE EDUCATION/TRAINING PROGRAM

## 2025-01-01 PROCEDURE — 82962 GLUCOSE BLOOD TEST: CPT

## 2025-01-01 PROCEDURE — 6370000000 HC RX 637 (ALT 250 FOR IP): Performed by: STUDENT IN AN ORGANIZED HEALTH CARE EDUCATION/TRAINING PROGRAM

## 2025-01-01 PROCEDURE — 6370000000 HC RX 637 (ALT 250 FOR IP): Performed by: HOSPITALIST

## 2025-01-01 PROCEDURE — 6370000000 HC RX 637 (ALT 250 FOR IP): Performed by: FAMILY MEDICINE

## 2025-01-01 PROCEDURE — 2060000000 HC ICU INTERMEDIATE R&B

## 2025-01-01 PROCEDURE — 94761 N-INVAS EAR/PLS OXIMETRY MLT: CPT

## 2025-01-01 PROCEDURE — 80202 ASSAY OF VANCOMYCIN: CPT

## 2025-01-01 PROCEDURE — 6370000000 HC RX 637 (ALT 250 FOR IP)

## 2025-01-01 RX ORDER — INSULIN GLARGINE 100 [IU]/ML
17 INJECTION, SOLUTION SUBCUTANEOUS NIGHTLY
Status: DISCONTINUED | OUTPATIENT
Start: 2025-01-01 | End: 2025-01-03 | Stop reason: HOSPADM

## 2025-01-01 RX ADMIN — PANTOPRAZOLE SODIUM 40 MG: 40 TABLET, DELAYED RELEASE ORAL at 08:35

## 2025-01-01 RX ADMIN — ESCITALOPRAM OXALATE 10 MG: 10 TABLET ORAL at 08:35

## 2025-01-01 RX ADMIN — ACETAMINOPHEN 650 MG: 325 TABLET ORAL at 10:11

## 2025-01-01 RX ADMIN — INSULIN LISPRO 6 UNITS: 100 INJECTION, SOLUTION INTRAVENOUS; SUBCUTANEOUS at 17:37

## 2025-01-01 RX ADMIN — INSULIN LISPRO 2 UNITS: 100 INJECTION, SOLUTION INTRAVENOUS; SUBCUTANEOUS at 13:00

## 2025-01-01 RX ADMIN — DICLOFENAC SODIUM 2 G: 10 GEL TOPICAL at 21:58

## 2025-01-01 RX ADMIN — INSULIN LISPRO 10 UNITS: 100 INJECTION, SOLUTION INTRAVENOUS; SUBCUTANEOUS at 17:37

## 2025-01-01 RX ADMIN — PREDNISONE 30 MG: 10 TABLET ORAL at 08:33

## 2025-01-01 RX ADMIN — ACETAMINOPHEN 650 MG: 325 TABLET ORAL at 21:56

## 2025-01-01 RX ADMIN — CARVEDILOL 6.25 MG: 6.25 TABLET, FILM COATED ORAL at 08:33

## 2025-01-01 RX ADMIN — GABAPENTIN 300 MG: 300 CAPSULE ORAL at 21:56

## 2025-01-01 RX ADMIN — EZETIMIBE 10 MG: 10 TABLET ORAL at 21:56

## 2025-01-01 RX ADMIN — INSULIN LISPRO 4 UNITS: 100 INJECTION, SOLUTION INTRAVENOUS; SUBCUTANEOUS at 21:57

## 2025-01-01 RX ADMIN — VANCOMYCIN HYDROCHLORIDE 1250 MG: 1.25 INJECTION, POWDER, LYOPHILIZED, FOR SOLUTION INTRAVENOUS at 12:39

## 2025-01-01 RX ADMIN — PRIMIDONE 50 MG: 50 TABLET ORAL at 21:56

## 2025-01-01 RX ADMIN — MONTELUKAST 10 MG: 10 TABLET, FILM COATED ORAL at 21:56

## 2025-01-01 RX ADMIN — CARVEDILOL 6.25 MG: 6.25 TABLET, FILM COATED ORAL at 17:37

## 2025-01-01 RX ADMIN — DICLOFENAC SODIUM 2 G: 10 GEL TOPICAL at 17:32

## 2025-01-01 RX ADMIN — PRIMIDONE 50 MG: 50 TABLET ORAL at 08:33

## 2025-01-01 RX ADMIN — INSULIN LISPRO 10 UNITS: 100 INJECTION, SOLUTION INTRAVENOUS; SUBCUTANEOUS at 12:59

## 2025-01-01 RX ADMIN — INSULIN GLARGINE 17 UNITS: 100 INJECTION, SOLUTION SUBCUTANEOUS at 21:57

## 2025-01-01 RX ADMIN — DICLOFENAC SODIUM 2 G: 10 GEL TOPICAL at 12:41

## 2025-01-01 RX ADMIN — DICLOFENAC SODIUM 2 G: 10 GEL TOPICAL at 08:34

## 2025-01-01 ASSESSMENT — PAIN SCALES - GENERAL
PAINLEVEL_OUTOF10: 3
PAINLEVEL_OUTOF10: 5
PAINLEVEL_OUTOF10: 5
PAINLEVEL_OUTOF10: 2

## 2025-01-01 ASSESSMENT — PAIN DESCRIPTION - LOCATION: LOCATION: LEG

## 2025-01-01 ASSESSMENT — PAIN DESCRIPTION - DESCRIPTORS: DESCRIPTORS: ACHING;TINGLING;NUMBNESS

## 2025-01-01 ASSESSMENT — PAIN DESCRIPTION - ORIENTATION: ORIENTATION: RIGHT;LEFT

## 2025-01-02 ENCOUNTER — APPOINTMENT (OUTPATIENT)
Facility: HOSPITAL | Age: 80
DRG: 871 | End: 2025-01-02
Payer: MEDICARE

## 2025-01-02 LAB
ALBUMIN SERPL-MCNC: 2.6 G/DL (ref 3.5–5)
ALBUMIN/GLOB SERPL: 0.8 (ref 1.1–2.2)
ALP SERPL-CCNC: 158 U/L (ref 45–117)
ALT SERPL-CCNC: 95 U/L (ref 12–78)
ANION GAP SERPL CALC-SCNC: 4 MMOL/L (ref 2–12)
AST SERPL-CCNC: 34 U/L (ref 15–37)
BASOPHILS # BLD: 0 K/UL (ref 0–0.1)
BASOPHILS NFR BLD: 0 % (ref 0–1)
BILIRUB SERPL-MCNC: 0.5 MG/DL (ref 0.2–1)
BUN SERPL-MCNC: 21 MG/DL (ref 6–20)
BUN/CREAT SERPL: 33 (ref 12–20)
CALCIUM SERPL-MCNC: 8.7 MG/DL (ref 8.5–10.1)
CHLORIDE SERPL-SCNC: 108 MMOL/L (ref 97–108)
CO2 SERPL-SCNC: 30 MMOL/L (ref 21–32)
CREAT SERPL-MCNC: 0.63 MG/DL (ref 0.55–1.02)
DIFFERENTIAL METHOD BLD: ABNORMAL
ECHO BSA: 2.04 M2
EOSINOPHIL # BLD: 0.1 K/UL (ref 0–0.4)
EOSINOPHIL NFR BLD: 1 % (ref 0–7)
ERYTHROCYTE [DISTWIDTH] IN BLOOD BY AUTOMATED COUNT: 15.2 % (ref 11.5–14.5)
GLOBULIN SER CALC-MCNC: 3.3 G/DL (ref 2–4)
GLUCOSE BLD STRIP.AUTO-MCNC: 242 MG/DL (ref 65–117)
GLUCOSE BLD STRIP.AUTO-MCNC: 293 MG/DL (ref 65–117)
GLUCOSE BLD STRIP.AUTO-MCNC: 93 MG/DL (ref 65–117)
GLUCOSE SERPL-MCNC: 148 MG/DL (ref 65–100)
HCT VFR BLD AUTO: 41.2 % (ref 35–47)
HGB BLD-MCNC: 13.5 G/DL (ref 11.5–16)
IMM GRANULOCYTES # BLD AUTO: 0.1 K/UL (ref 0–0.04)
IMM GRANULOCYTES NFR BLD AUTO: 2 % (ref 0–0.5)
LYMPHOCYTES # BLD: 1.4 K/UL (ref 0.8–3.5)
LYMPHOCYTES NFR BLD: 22 % (ref 12–49)
MCH RBC QN AUTO: 31.6 PG (ref 26–34)
MCHC RBC AUTO-ENTMCNC: 32.8 G/DL (ref 30–36.5)
MCV RBC AUTO: 96.5 FL (ref 80–99)
MONOCYTES # BLD: 0.5 K/UL (ref 0–1)
MONOCYTES NFR BLD: 8 % (ref 5–13)
NEUTS SEG # BLD: 4.2 K/UL (ref 1.8–8)
NEUTS SEG NFR BLD: 67 % (ref 32–75)
NRBC # BLD: 0 K/UL (ref 0–0.01)
NRBC BLD-RTO: 0 PER 100 WBC
PLATELET # BLD AUTO: 157 K/UL (ref 150–400)
PMV BLD AUTO: 9.8 FL (ref 8.9–12.9)
POTASSIUM SERPL-SCNC: 3.6 MMOL/L (ref 3.5–5.1)
PROT SERPL-MCNC: 5.9 G/DL (ref 6.4–8.2)
RBC # BLD AUTO: 4.27 M/UL (ref 3.8–5.2)
SERVICE CMNT-IMP: ABNORMAL
SERVICE CMNT-IMP: ABNORMAL
SERVICE CMNT-IMP: NORMAL
SODIUM SERPL-SCNC: 142 MMOL/L (ref 136–145)
WBC # BLD AUTO: 6.2 K/UL (ref 3.6–11)

## 2025-01-02 PROCEDURE — 99233 SBSQ HOSP IP/OBS HIGH 50: CPT | Performed by: INTERNAL MEDICINE

## 2025-01-02 PROCEDURE — 6360000002 HC RX W HCPCS: Performed by: HOSPITALIST

## 2025-01-02 PROCEDURE — 6370000000 HC RX 637 (ALT 250 FOR IP): Performed by: FAMILY MEDICINE

## 2025-01-02 PROCEDURE — 2580000003 HC RX 258: Performed by: STUDENT IN AN ORGANIZED HEALTH CARE EDUCATION/TRAINING PROGRAM

## 2025-01-02 PROCEDURE — 93312 ECHO TRANSESOPHAGEAL: CPT

## 2025-01-02 PROCEDURE — 99152 MOD SED SAME PHYS/QHP 5/>YRS: CPT

## 2025-01-02 PROCEDURE — 6360000002 HC RX W HCPCS: Performed by: INTERNAL MEDICINE

## 2025-01-02 PROCEDURE — 6360000002 HC RX W HCPCS: Performed by: STUDENT IN AN ORGANIZED HEALTH CARE EDUCATION/TRAINING PROGRAM

## 2025-01-02 PROCEDURE — 6370000000 HC RX 637 (ALT 250 FOR IP): Performed by: INTERNAL MEDICINE

## 2025-01-02 PROCEDURE — 80053 COMPREHEN METABOLIC PANEL: CPT

## 2025-01-02 PROCEDURE — 93325 DOPPLER ECHO COLOR FLOW MAPG: CPT | Performed by: INTERNAL MEDICINE

## 2025-01-02 PROCEDURE — 85025 COMPLETE CBC W/AUTO DIFF WBC: CPT

## 2025-01-02 PROCEDURE — 94761 N-INVAS EAR/PLS OXIMETRY MLT: CPT

## 2025-01-02 PROCEDURE — 99231 SBSQ HOSP IP/OBS SF/LOW 25: CPT

## 2025-01-02 PROCEDURE — 6370000000 HC RX 637 (ALT 250 FOR IP)

## 2025-01-02 PROCEDURE — 6370000000 HC RX 637 (ALT 250 FOR IP): Performed by: HOSPITALIST

## 2025-01-02 PROCEDURE — 99152 MOD SED SAME PHYS/QHP 5/>YRS: CPT | Performed by: INTERNAL MEDICINE

## 2025-01-02 PROCEDURE — 82962 GLUCOSE BLOOD TEST: CPT

## 2025-01-02 PROCEDURE — 93312 ECHO TRANSESOPHAGEAL: CPT | Performed by: INTERNAL MEDICINE

## 2025-01-02 PROCEDURE — 2060000000 HC ICU INTERMEDIATE R&B

## 2025-01-02 PROCEDURE — 6370000000 HC RX 637 (ALT 250 FOR IP): Performed by: STUDENT IN AN ORGANIZED HEALTH CARE EDUCATION/TRAINING PROGRAM

## 2025-01-02 RX ORDER — MIDAZOLAM HYDROCHLORIDE 1 MG/ML
INJECTION, SOLUTION INTRAMUSCULAR; INTRAVENOUS PRN
Status: COMPLETED | OUTPATIENT
Start: 2025-01-02 | End: 2025-01-02

## 2025-01-02 RX ORDER — LIDOCAINE 50 MG/G
OINTMENT TOPICAL PRN
Status: COMPLETED | OUTPATIENT
Start: 2025-01-02 | End: 2025-01-02

## 2025-01-02 RX ORDER — FENTANYL CITRATE 50 UG/ML
INJECTION, SOLUTION INTRAMUSCULAR; INTRAVENOUS PRN
Status: COMPLETED | OUTPATIENT
Start: 2025-01-02 | End: 2025-01-02

## 2025-01-02 RX ORDER — INSULIN LISPRO 100 [IU]/ML
12 INJECTION, SOLUTION INTRAVENOUS; SUBCUTANEOUS
Status: DISCONTINUED | OUTPATIENT
Start: 2025-01-02 | End: 2025-01-03 | Stop reason: HOSPADM

## 2025-01-02 RX ORDER — LIDOCAINE HYDROCHLORIDE 20 MG/ML
SOLUTION OROPHARYNGEAL PRN
Status: COMPLETED | OUTPATIENT
Start: 2025-01-02 | End: 2025-01-02

## 2025-01-02 RX ADMIN — INSULIN LISPRO 4 UNITS: 100 INJECTION, SOLUTION INTRAVENOUS; SUBCUTANEOUS at 17:25

## 2025-01-02 RX ADMIN — DICLOFENAC SODIUM 2 G: 10 GEL TOPICAL at 09:47

## 2025-01-02 RX ADMIN — LIDOCAINE HYDROCHLORIDE 15 ML: 20 SOLUTION ORAL at 11:17

## 2025-01-02 RX ADMIN — EZETIMIBE 10 MG: 10 TABLET ORAL at 21:43

## 2025-01-02 RX ADMIN — ACETAMINOPHEN 650 MG: 325 TABLET ORAL at 17:24

## 2025-01-02 RX ADMIN — ESCITALOPRAM OXALATE 10 MG: 10 TABLET ORAL at 09:43

## 2025-01-02 RX ADMIN — CARVEDILOL 6.25 MG: 6.25 TABLET, FILM COATED ORAL at 09:43

## 2025-01-02 RX ADMIN — VANCOMYCIN HYDROCHLORIDE 1250 MG: 1.25 INJECTION, POWDER, LYOPHILIZED, FOR SOLUTION INTRAVENOUS at 06:09

## 2025-01-02 RX ADMIN — FENTANYL CITRATE 25 MCG: 50 INJECTION, SOLUTION INTRAMUSCULAR; INTRAVENOUS at 11:41

## 2025-01-02 RX ADMIN — INSULIN LISPRO 12 UNITS: 100 INJECTION, SOLUTION INTRAVENOUS; SUBCUTANEOUS at 17:25

## 2025-01-02 RX ADMIN — CARVEDILOL 6.25 MG: 6.25 TABLET, FILM COATED ORAL at 17:24

## 2025-01-02 RX ADMIN — VANCOMYCIN HYDROCHLORIDE 1250 MG: 1.25 INJECTION, POWDER, LYOPHILIZED, FOR SOLUTION INTRAVENOUS at 23:53

## 2025-01-02 RX ADMIN — GABAPENTIN 300 MG: 300 CAPSULE ORAL at 21:43

## 2025-01-02 RX ADMIN — PREDNISONE 30 MG: 10 TABLET ORAL at 09:43

## 2025-01-02 RX ADMIN — PRIMIDONE 50 MG: 50 TABLET ORAL at 21:43

## 2025-01-02 RX ADMIN — INSULIN GLARGINE 17 UNITS: 100 INJECTION, SOLUTION SUBCUTANEOUS at 21:44

## 2025-01-02 RX ADMIN — MIDAZOLAM HYDROCHLORIDE 1 MG: 1 INJECTION, SOLUTION INTRAMUSCULAR; INTRAVENOUS at 11:40

## 2025-01-02 RX ADMIN — INSULIN LISPRO 2 UNITS: 100 INJECTION, SOLUTION INTRAVENOUS; SUBCUTANEOUS at 21:44

## 2025-01-02 RX ADMIN — DICLOFENAC SODIUM 2 G: 10 GEL TOPICAL at 21:43

## 2025-01-02 RX ADMIN — PANTOPRAZOLE SODIUM 40 MG: 40 TABLET, DELAYED RELEASE ORAL at 06:09

## 2025-01-02 RX ADMIN — MONTELUKAST 10 MG: 10 TABLET, FILM COATED ORAL at 21:43

## 2025-01-02 RX ADMIN — PRIMIDONE 50 MG: 50 TABLET ORAL at 09:43

## 2025-01-02 RX ADMIN — FENTANYL CITRATE 25 MCG: 50 INJECTION, SOLUTION INTRAMUSCULAR; INTRAVENOUS at 11:36

## 2025-01-02 RX ADMIN — ACETAMINOPHEN 650 MG: 325 TABLET ORAL at 21:43

## 2025-01-02 RX ADMIN — LIDOCAINE 5 ML: 50 OINTMENT TOPICAL at 11:22

## 2025-01-02 RX ADMIN — MIDAZOLAM HYDROCHLORIDE 2 MG: 1 INJECTION, SOLUTION INTRAMUSCULAR; INTRAVENOUS at 11:36

## 2025-01-02 RX ADMIN — DICLOFENAC SODIUM 2 G: 10 GEL TOPICAL at 17:25

## 2025-01-02 ASSESSMENT — PAIN SCALES - GENERAL: PAINLEVEL_OUTOF10: 5

## 2025-01-03 ENCOUNTER — APPOINTMENT (OUTPATIENT)
Facility: HOSPITAL | Age: 80
DRG: 871 | End: 2025-01-03
Attending: FAMILY MEDICINE
Payer: MEDICARE

## 2025-01-03 VITALS
HEART RATE: 85 BPM | TEMPERATURE: 98.6 F | HEIGHT: 63 IN | WEIGHT: 206 LBS | SYSTOLIC BLOOD PRESSURE: 114 MMHG | DIASTOLIC BLOOD PRESSURE: 77 MMHG | BODY MASS INDEX: 36.5 KG/M2 | OXYGEN SATURATION: 97 % | RESPIRATION RATE: 16 BRPM

## 2025-01-03 LAB
ALBUMIN SERPL-MCNC: 2.5 G/DL (ref 3.5–5)
ALBUMIN/GLOB SERPL: 0.8 (ref 1.1–2.2)
ALP SERPL-CCNC: 139 U/L (ref 45–117)
ALT SERPL-CCNC: 77 U/L (ref 12–78)
ANION GAP SERPL CALC-SCNC: 3 MMOL/L (ref 2–12)
AST SERPL-CCNC: 26 U/L (ref 15–37)
BASOPHILS # BLD: 0 K/UL (ref 0–0.1)
BASOPHILS NFR BLD: 0 % (ref 0–1)
BILIRUB SERPL-MCNC: 0.6 MG/DL (ref 0.2–1)
BUN SERPL-MCNC: 16 MG/DL (ref 6–20)
BUN/CREAT SERPL: 34 (ref 12–20)
CALCIUM SERPL-MCNC: 8.5 MG/DL (ref 8.5–10.1)
CHLORIDE SERPL-SCNC: 110 MMOL/L (ref 97–108)
CO2 SERPL-SCNC: 29 MMOL/L (ref 21–32)
CREAT SERPL-MCNC: 0.47 MG/DL (ref 0.55–1.02)
DIFFERENTIAL METHOD BLD: ABNORMAL
EOSINOPHIL # BLD: 0.1 K/UL (ref 0–0.4)
EOSINOPHIL NFR BLD: 1 % (ref 0–7)
ERYTHROCYTE [DISTWIDTH] IN BLOOD BY AUTOMATED COUNT: 15.1 % (ref 11.5–14.5)
GLOBULIN SER CALC-MCNC: 3.1 G/DL (ref 2–4)
GLUCOSE BLD STRIP.AUTO-MCNC: 107 MG/DL (ref 65–117)
GLUCOSE BLD STRIP.AUTO-MCNC: 163 MG/DL (ref 65–117)
GLUCOSE BLD STRIP.AUTO-MCNC: 183 MG/DL (ref 65–117)
GLUCOSE SERPL-MCNC: 118 MG/DL (ref 65–100)
HCT VFR BLD AUTO: 39 % (ref 35–47)
HGB BLD-MCNC: 13.2 G/DL (ref 11.5–16)
IMM GRANULOCYTES # BLD AUTO: 0.1 K/UL (ref 0–0.04)
IMM GRANULOCYTES NFR BLD AUTO: 1 % (ref 0–0.5)
LYMPHOCYTES # BLD: 1.4 K/UL (ref 0.8–3.5)
LYMPHOCYTES NFR BLD: 23 % (ref 12–49)
MCH RBC QN AUTO: 32.4 PG (ref 26–34)
MCHC RBC AUTO-ENTMCNC: 33.8 G/DL (ref 30–36.5)
MCV RBC AUTO: 95.6 FL (ref 80–99)
MONOCYTES # BLD: 0.4 K/UL (ref 0–1)
MONOCYTES NFR BLD: 7 % (ref 5–13)
NEUTS SEG # BLD: 4 K/UL (ref 1.8–8)
NEUTS SEG NFR BLD: 68 % (ref 32–75)
NRBC # BLD: 0 K/UL (ref 0–0.01)
NRBC BLD-RTO: 0 PER 100 WBC
PLATELET # BLD AUTO: 155 K/UL (ref 150–400)
PMV BLD AUTO: 10.1 FL (ref 8.9–12.9)
POTASSIUM SERPL-SCNC: 3.7 MMOL/L (ref 3.5–5.1)
PROT SERPL-MCNC: 5.6 G/DL (ref 6.4–8.2)
RBC # BLD AUTO: 4.08 M/UL (ref 3.8–5.2)
SERVICE CMNT-IMP: ABNORMAL
SERVICE CMNT-IMP: ABNORMAL
SERVICE CMNT-IMP: NORMAL
SODIUM SERPL-SCNC: 142 MMOL/L (ref 136–145)
WBC # BLD AUTO: 6 K/UL (ref 3.6–11)

## 2025-01-03 PROCEDURE — 80053 COMPREHEN METABOLIC PANEL: CPT

## 2025-01-03 PROCEDURE — 94761 N-INVAS EAR/PLS OXIMETRY MLT: CPT

## 2025-01-03 PROCEDURE — 85025 COMPLETE CBC W/AUTO DIFF WBC: CPT

## 2025-01-03 PROCEDURE — 6360000002 HC RX W HCPCS: Performed by: HOSPITALIST

## 2025-01-03 PROCEDURE — 6370000000 HC RX 637 (ALT 250 FOR IP)

## 2025-01-03 PROCEDURE — 99231 SBSQ HOSP IP/OBS SF/LOW 25: CPT

## 2025-01-03 PROCEDURE — 97110 THERAPEUTIC EXERCISES: CPT

## 2025-01-03 PROCEDURE — 97530 THERAPEUTIC ACTIVITIES: CPT

## 2025-01-03 PROCEDURE — 82962 GLUCOSE BLOOD TEST: CPT

## 2025-01-03 PROCEDURE — 97535 SELF CARE MNGMENT TRAINING: CPT

## 2025-01-03 PROCEDURE — 2580000003 HC RX 258: Performed by: INTERNAL MEDICINE

## 2025-01-03 PROCEDURE — 6370000000 HC RX 637 (ALT 250 FOR IP): Performed by: STUDENT IN AN ORGANIZED HEALTH CARE EDUCATION/TRAINING PROGRAM

## 2025-01-03 PROCEDURE — 6370000000 HC RX 637 (ALT 250 FOR IP): Performed by: FAMILY MEDICINE

## 2025-01-03 PROCEDURE — 6370000000 HC RX 637 (ALT 250 FOR IP): Performed by: HOSPITALIST

## 2025-01-03 PROCEDURE — 6360000002 HC RX W HCPCS: Performed by: INTERNAL MEDICINE

## 2025-01-03 RX ORDER — INSULIN GLARGINE 100 [IU]/ML
17 INJECTION, SOLUTION SUBCUTANEOUS NIGHTLY
Qty: 15 ML | Refills: 11 | Status: SHIPPED
Start: 2025-01-03

## 2025-01-03 RX ORDER — ESCITALOPRAM OXALATE 10 MG/1
10 TABLET ORAL DAILY
COMMUNITY

## 2025-01-03 RX ORDER — QUETIAPINE FUMARATE 25 MG/1
25 TABLET, FILM COATED ORAL
Status: ON HOLD | COMMUNITY
End: 2025-01-03 | Stop reason: HOSPADM

## 2025-01-03 RX ORDER — INSULIN ASPART 100 [IU]/ML
12 INJECTION, SOLUTION INTRAVENOUS; SUBCUTANEOUS
Qty: 5 ADJUSTABLE DOSE PRE-FILLED PEN SYRINGE | Refills: 3 | Status: SHIPPED
Start: 2025-01-03

## 2025-01-03 RX ADMIN — INSULIN LISPRO 2 UNITS: 100 INJECTION, SOLUTION INTRAVENOUS; SUBCUTANEOUS at 12:12

## 2025-01-03 RX ADMIN — PRIMIDONE 50 MG: 50 TABLET ORAL at 09:04

## 2025-01-03 RX ADMIN — DICLOFENAC SODIUM 2 G: 10 GEL TOPICAL at 13:55

## 2025-01-03 RX ADMIN — INSULIN LISPRO 12 UNITS: 100 INJECTION, SOLUTION INTRAVENOUS; SUBCUTANEOUS at 09:07

## 2025-01-03 RX ADMIN — ACETAMINOPHEN 650 MG: 325 TABLET ORAL at 09:15

## 2025-01-03 RX ADMIN — PREDNISONE 30 MG: 10 TABLET ORAL at 09:04

## 2025-01-03 RX ADMIN — ESCITALOPRAM OXALATE 10 MG: 10 TABLET ORAL at 09:04

## 2025-01-03 RX ADMIN — PANTOPRAZOLE SODIUM 40 MG: 40 TABLET, DELAYED RELEASE ORAL at 06:20

## 2025-01-03 RX ADMIN — CARVEDILOL 6.25 MG: 6.25 TABLET, FILM COATED ORAL at 09:05

## 2025-01-03 RX ADMIN — INSULIN LISPRO 12 UNITS: 100 INJECTION, SOLUTION INTRAVENOUS; SUBCUTANEOUS at 12:12

## 2025-01-03 RX ADMIN — LISINOPRIL 10 MG: 5 TABLET ORAL at 09:04

## 2025-01-03 RX ADMIN — SODIUM CHLORIDE 550 MG: 9 INJECTION INTRAMUSCULAR; INTRAVENOUS; SUBCUTANEOUS at 13:54

## 2025-01-03 RX ADMIN — DICLOFENAC SODIUM 2 G: 10 GEL TOPICAL at 09:05

## 2025-01-03 RX ADMIN — INSULIN HUMAN 28 UNITS: 100 INJECTION, SUSPENSION SUBCUTANEOUS at 09:07

## 2025-01-03 ASSESSMENT — PAIN SCALES - GENERAL
PAINLEVEL_OUTOF10: 5
PAINLEVEL_OUTOF10: 0

## 2025-01-03 NOTE — DISCHARGE INSTRUCTIONS
HOSPITALIST DISCHARGE INSTRUCTIONS  NAME:  Siomara Collins   :  1945   MRN:  [id number]     Date/Time:  1/3/2025 12:24 PM    ADMIT DATE: 2024     DISCHARGE DATE: 1/3/2025     DISCHARGE DIAGNOSIS:  bacteremia    DISCHARGE INSTRUCTIONS:  Thank you for allowing us to participate in your care. Your discharging Hospitalist is Yasmani Roberts MD. You were admitted for evaluation and treatment of the above.     --Please give a copy of this document to your primary care provider.  Please follow up with them within one week.       MEDICATIONS:    It is important that you take the medication exactly as they are prescribed.   Keep your medication in the bottles provided by the pharmacist and keep a list of the medication names, dosages, and times to be taken in your wallet.   Do not take other medications without consulting your doctor.             If you experience any of the following symptoms then please call your primary care physician or return to the emergency room if you cannot get hold of your doctor:  Fever, chills, nausea, vomiting, diarrhea, change in mentation, falling, bleeding, shortness of breath    Follow Up:  Please call the below provider to arrange hospital follow up appointment      Irvin Vagn MD  [address] [facility] [address] [phone]    Schedule an appointment as soon as possible for a visit      Nella Rubi MD  [address] [facility]  [address]  [facility] [address] [phone]    Schedule an appointment as soon as possible for a visit          Information obtained by :  I understand that if any problems occur once I am at home I am to contact my physician.    I understand and acknowledge receipt of the instructions indicated above.                                                                                                                                           Physician's or R.N.'s Signature

## 2025-01-03 NOTE — CONSULTS
[facility]  DIABETES MANAGEMENT CONSULT    Consulted by Provider for advanced nursing evaluation and care for inpatient blood glucose management.    Evaluation and Action Plan   Siomara Collins is a  79 y.o. female with T2DM, obesity, HLD, CKD, recurrent UTIs, HTN, polymyalgia rheumatica, recent admission for heart block and pacer insertion, who was admitted with nausea, hypotension, and hypoxia. Work up indicating acute bacteremia and transaminitis. GI and ID following. Per chart review, her PTA meds consist of Lantus 30 units daily, NPH 55 units with prednisone 30mg dose, Novolog 15 units with meals TID, and ozempic 0.25mg weekly. Patient sleeping at time of visit. Will follow up.    1/2 - Patient's with normal FBG and then hyperglycemia throughout the day last few days. Some of her insulin doses are being missed or held, which is likely contributing to high BG late in day. Patient's FBG 93 today. She is NPO for BUTCH later this morning. Continues on her daily Prednisone 30 mg and seems to be eating when not NPO. Will increase bolus insulin and encourage consistency in morning insulin doses, which are often being held. Please anticipate glucose rise during the day in light of steroid.    Blood glucose pattern    Significant diabetes-related events over the past 24-72 hours  A1C 10.5%  Fasting B  Pre-prandial: 227-324  Basal: 17 units ; NPH 28 units with steroid  Bolus: 10 units with meals  Correction: 10 units   Total daily insulin dose in the last 24 hours:      Management Rationale Action Plan   Medication   Basal needs Using 0.2 units/kg/D based on weight  Lantus 18 units nightly   Nutritional needs Covers carb load in meals Humalog 12 units with meals   Corrective insulin Using medium sensitivity based on weight     Overriding steroid effect Using 0.3 units/kg NPH 28 units daily with Prednisone   Lab [x]        Hemoglobin A1c     Additional orders  Carb consistent diet (60g 
[facility]  DIABETES MANAGEMENT CONSULT    Consulted by Provider for advanced nursing evaluation and care for inpatient blood glucose management.    Evaluation and Action Plan   Siomara Collins is a  79 y.o. female with T2DM, obesity, HLD, CKD, recurrent UTIs, HTN, polymyalgia rheumatica, recent admission for heart block and pacer insertion, who was admitted with nausea, hypotension, and hypoxia. Work up indicating acute bacteremia and transaminitis. GI and ID following. Per chart review, her PTA meds consist of Lantus 30 units daily, NPH 55 units with prednisone 30mg dose, Novolog 15 units with meals TID, and ozempic 0.25mg weekly. Patient sleeping at time of visit. Will follow up.    1/3 - Patient sleeping at time of visit. BUTCH done yesterday. Possibly going back to SNF today. She continues to have normal FBG and then hyperglycemia late afternoon/evening. Discussed need to morning insulin with nurse, especially if patient eating well. Needs consistency in morning insulin doses, which are often being held. Please anticipate glucose rise during the day in light of steroid. See below for discharge recs.    Blood glucose pattern    Significant diabetes-related events over the past 24-72 hours  A1C 10.5%  Fasting B  Pre-prandial: 293  Basal: 17 units ; NPH 28 units with steroid (held last few days)  Bolus: 12 units with meals  Correction: 6 units   Total daily insulin dose in the last 24 hours: 25 units       Management Rationale Action Plan   Medication   Basal needs Using 0.2 units/kg/D based on weight  Lantus 17 units nightly   Nutritional needs Covers carb load in meals Humalog 12 units with meals   Corrective insulin Using medium sensitivity based on weight     Overriding steroid effect Using 0.3 units/kg NPH 28 units daily with Prednisone   Lab [x]        Hemoglobin A1c     Additional orders  Carb consistent diet (60g CHO/meal)       Diabetes Discharge Plan   Medication: 
Consult completed 12/28/24.   
Plan for BUTCH on Thursday 1/2/25 to rule out bacteremia, recent pacer insertion on 11/25/24. NPO after midnight.     Will discuss w/ pt.   
Please see H&P  
hospital care - 40 minutes     Before making these care recommendations, I personally reviewed the hospitalization record, including notes, laboratory & diagnostic data and current medications, and examined the patient at the bedside.  Total minutes: 40    ROLANDO SUGGS - CNS   Diabetes Clinical Nurse Specialist   [facility] for Diabetes [facility]  Access via [employer]   
ondansetron (ZOFRAN) injection 4 mg, 4 mg, IntraVENous, Q6H PRN, Gretchen Brito MD    enoxaparin (LOVENOX) injection 40 mg, 40 mg, SubCUTAneous, Daily, Gretchen Brito MD Denise M Dietz, MD [facility]  [place] center: (P) [phone]  (F) [phone]      CC:Irvin Vang MD      
AM   Result Value Ref Range    WBC 9.1 3.6 - 11.0 K/uL    RBC 3.83 3.80 - 5.20 M/uL    Hemoglobin 12.3 11.5 - 16.0 g/dL    Hematocrit 36.9 35.0 - 47.0 %    MCV 96.3 80.0 - 99.0 FL    MCH 32.1 26.0 - 34.0 PG    MCHC 33.3 30.0 - 36.5 g/dL    RDW 14.7 (H) 11.5 - 14.5 %    Platelets 147 (L) 150 - 400 K/uL    MPV 9.7 8.9 - 12.9 FL    Nucleated RBCs 0.0 0  WBC    nRBC 0.00 0.00 - 0.01 K/uL    Neutrophils % 90 (H) 32 - 75 %    Lymphocytes % 5 (L) 12 - 49 %    Monocytes % 5 5 - 13 %    Eosinophils % 0 0 - 7 %    Basophils % 0 0 - 1 %    Immature Granulocytes % 0 0.0 - 0.5 %    Neutrophils Absolute 8.1 (H) 1.8 - 8.0 K/UL    Lymphocytes Absolute 0.5 (L) 0.8 - 3.5 K/UL    Monocytes Absolute 0.5 0.0 - 1.0 K/UL    Eosinophils Absolute 0.0 0.0 - 0.4 K/UL    Basophils Absolute 0.0 0.0 - 0.1 K/UL    Immature Granulocytes Absolute 0.0 0.00 - 0.04 K/UL    Differential Type SMEAR SCANNED      RBC Comment NORMOCYTIC, NORMOCHROMIC     Hepatic Function Panel    Collection Time: 12/29/24  5:31 AM   Result Value Ref Range    Total Protein 5.8 (L) 6.4 - 8.2 g/dL    Albumin 2.6 (L) 3.5 - 5.0 g/dL    Globulin 3.2 2.0 - 4.0 g/dL    Albumin/Globulin Ratio 0.8 (L) 1.1 - 2.2      Total Bilirubin 0.8 0.2 - 1.0 MG/DL    Bilirubin, Direct 0.5 (H) 0.0 - 0.2 MG/DL    Alk Phosphatase 152 (H) 45 - 117 U/L    AST 53 (H) 15 - 37 U/L     (H) 12 - 78 U/L   Troponin    Collection Time: 12/29/24  5:46 AM   Result Value Ref Range    Troponin, High Sensitivity 31 0 - 51 ng/L   POCT Glucose    Collection Time: 12/29/24  7:54 AM   Result Value Ref Range    POC Glucose 264 (H) 65 - 117 mg/dL    Performed by: LETICIA OSPINA       Microbiology:      Studies:      A total time of 75 minutes was spent on today's encounter.  Greater than 50% of the time was spent on the following:  Preparing for visit and chart review.  Obtaining and/or reviewing separately obtained history  Performing a medically appropriate exam and/or evaluation  Counseling and 
- 4.0 g/dL    Albumin/Globulin Ratio 0.9 (L) 1.1 - 2.2     POCT Glucose    Collection Time: 12/28/24  8:45 AM   Result Value Ref Range    POC Glucose 246 (H) 65 - 117 mg/dL    Performed by: Reagan Owen    POCT Glucose    Collection Time: 12/28/24 11:44 AM   Result Value Ref Range    POC Glucose 317 (H) 65 - 117 mg/dL    Performed by: MIKE ENGLE          Assessment/Plan:     Principal Problem:    Hypotension  Resolved Problems:    * No resolved hospital problems. *       See above narrative for full detail.        Gorge Lowe MD  12/28/2024

## 2025-01-03 NOTE — DISCHARGE SUMMARY
Patient ID:  Siomara Collins  [id number]  79 y.o.  1945    Admit date: 12/27/2024    Discharge date and time: 1/3/2025    Admission Diagnoses: Hypotension [I95.9]  Hypoxia [R09.02]  Elevated troponin [R79.89]  Sepsis with acute organ dysfunction without septic shock, due to unspecified organism, unspecified organ dysfunction type (HCC) [A41.9, R65.20]    Discharge Diagnoses:    Principal Problem:    Hypotension  Active Problems:    Type 2 diabetes mellitus with hyperglycemia, with long-term current use of insulin (HCC)    Bacteremia    Pacemaker    Elevated liver enzymes    Allergy to multiple antibiotics    CKD (chronic kidney disease) stage 2, GFR 60-89 ml/min    Leukocytosis    Bacteremia due to Enterococcus  Resolved Problems:    * No resolved hospital problems. *       Admission HPI:  Ms. Collins is a very pleasant 79-year-old  female who has multiple medical problems including history of interstitial pulmonary fibrosis, fatty liver, polymyalgia rheumatica, depression, diabetes, chronic pain.  Patient was admitted by me on November 22 for heart block.  The patient had a pacemaker placed and was discharged on November 26.     The patient currently resides at [facility] .  The patient tells me that her primary care physician started her on lisinopril on December 16th as her Bp was high.  She was recently diagnosed with urinary tract infection on December 19 and was started on doxycycline.  Today, the patient was feeling nauseated and her blood pressure was low, so she was sent to the emergency room.  The patient denies having any chest pain, any fever.  She has no cough.  She was found to be hypotensive and hypoxic.  In the ER, the patient was given IV fluids.  Put on 2 L of oxygen and we were asked to admit her for further evaluation.  The patient has no other complaints at this time.  A chest x-ray done in the ER showed a mild patchy opacity in the left lower lobe could be due to pneumonia or

## 2025-01-03 NOTE — PLAN OF CARE
Problem: Occupational Therapy - Adult  Goal: By Discharge: Performs self-care activities at highest level of function for planned discharge setting.  See evaluation for individualized goals.  Description: FUNCTIONAL STATUS PRIOR TO ADMISSION:  The pt presents from [facility]. She has primarily used a wheelchair for functional mobility for the last year, and has Ax1 to transfer bed>w/c. Pt reports recently working on standing with therapy but has not ambulated. She requires assistance for bathing and dressing from [facility] staff.    Occupational Therapy Goals:  Initiated 12/29/2024  1.  Patient will perform grooming with Set-up seated in chair within 7 day(s).  2.  Patient will perform upper body dressing with Set-up within 7 day(s).  3.  Patient will perform toilet transfers with Moderate Assist  within 7 day(s).  4.  Patient will perform all aspects of toileting with Moderate Assist within 7 day(s).  5.  Patient will participate in upper extremity therapeutic exercise/activities with Supervision for 5 minutes within 7 day(s).    6.  Patient will utilize energy conservation techniques during functional activities with verbal cues within 7 day(s).   Outcome: Progressing  OCCUPATIONAL THERAPY EVALUATION    Patient: Siomara Collins (79 y.o. female)  Date: 12/29/2024  Primary Diagnosis: Hypotension [I95.9]  Hypoxia [R09.02]  Elevated troponin [R79.89]  Sepsis with acute organ dysfunction without septic shock, due to unspecified organism, unspecified organ dysfunction type (HCC) [A41.9, R65.20]         Precautions:                    ASSESSMENT :  The patient is limited by decreased functional mobility, independence in ADLs, strength, activity tolerance, coordination, and balance following admission for sepsis and hypoxia. At baseline pt lives at [facility] and uses w/c for functional mobility, has assistance for transfers and for bathing and dressing ADLs. Pt was received seated EOB, reporting she was ready to return to bed due 
  Problem: Occupational Therapy - Adult  Goal: By Discharge: Performs self-care activities at highest level of function for planned discharge setting.  See evaluation for individualized goals.  Description: FUNCTIONAL STATUS PRIOR TO ADMISSION:  The pt presents from [facility]. She has primarily used a wheelchair for functional mobility for the last year, and has Ax1 to transfer bed>w/c. Pt reports recently working on standing with therapy but has not ambulated. She requires assistance for bathing and dressing from [facility] staff.    Occupational Therapy Goals:  Initiated 12/29/2024  1.  Patient will perform grooming with Set-up seated in chair within 7 day(s).  2.  Patient will perform upper body dressing with Set-up within 7 day(s).  3.  Patient will perform toilet transfers with Moderate Assist  within 7 day(s).  4.  Patient will perform all aspects of toileting with Moderate Assist within 7 day(s).  5.  Patient will participate in upper extremity therapeutic exercise/activities with Supervision for 5 minutes within 7 day(s).    6.  Patient will utilize energy conservation techniques during functional activities with verbal cues within 7 day(s).   Outcome: Progressing    OCCUPATIONAL THERAPY TREATMENT  Patient: Siomara Collins (79 y.o. female)  Date: 1/3/2025  Primary Diagnosis: Hypotension [I95.9]  Hypoxia [R09.02]  Elevated troponin [R79.89]  Sepsis with acute organ dysfunction without septic shock, due to unspecified organism, unspecified organ dysfunction type (HCC) [A41.9, R65.20]       Precautions:                  Chart, occupational therapy assessment, plan of care, and goals were reviewed.    ASSESSMENT  Patient continues to benefit from skilled OT services and is progressing towards goals. Patient continues to be limited by decreased strength (especially in LLE), activity tolerance, lower body access, and balance with ADLs and functional mobility this session. She required Mod A with bed mobility to sit 
  Problem: Occupational Therapy - Adult  Goal: By Discharge: Performs self-care activities at highest level of function for planned discharge setting.  See evaluation for individualized goals.  Description: FUNCTIONAL STATUS PRIOR TO ADMISSION:  The pt presents from [facility]. She has primarily used a wheelchair for functional mobility for the last year, and has Ax1 to transfer bed>w/c. Pt reports recently working on standing with therapy but has not ambulated. She requires assistance for bathing and dressing from [facility] staff.    Occupational Therapy Goals:  Initiated 12/29/2024  1.  Patient will perform grooming with Set-up seated in chair within 7 day(s).  2.  Patient will perform upper body dressing with Set-up within 7 day(s).  3.  Patient will perform toilet transfers with Moderate Assist  within 7 day(s).  4.  Patient will perform all aspects of toileting with Moderate Assist within 7 day(s).  5.  Patient will participate in upper extremity therapeutic exercise/activities with Supervision for 5 minutes within 7 day(s).    6.  Patient will utilize energy conservation techniques during functional activities with verbal cues within 7 day(s).   Outcome: Progressing   OCCUPATIONAL THERAPY TREATMENT  Patient: Siomara Collins (79 y.o. female)  Date: 12/31/2024  Primary Diagnosis: Hypotension [I95.9]  Hypoxia [R09.02]  Elevated troponin [R79.89]  Sepsis with acute organ dysfunction without septic shock, due to unspecified organism, unspecified organ dysfunction type (HCC) [A41.9, R65.20]       Precautions:                  Chart, occupational therapy assessment, plan of care, and goals were reviewed.    ASSESSMENT  Patient continues to benefit from skilled OT services and is progressing towards goals. Patient participated in ADL at bed level and chair level, completed multiple sit to stand within Stacey Stedy with Mod assist (from elevated sitting surface). Stood for LB ADL ~45 sec (with Stacey Stedy support). Patient 
  Problem: Physical Therapy - Adult  Goal: By Discharge: Performs mobility at highest level of function for planned discharge setting.  See evaluation for individualized goals.  Description: FUNCTIONAL STATUS PRIOR TO ADMISSION: The patient was functional at the wheelchair level and required minimal assistancefor transfers to the chair.    HOME SUPPORT PRIOR TO ADMISSION: The patient live in the [facility].    Physical Therapy Goals  Initiated 12/29/2024  1.  Patient will move from supine to sit and sit to supine, scoot up and down, and roll side to side in bed with minimal assistance within 7 day(s).    2.  Patient will perform sit to stand with minimal assistance within 7 day(s).  3.  Patient will transfer from bed to chair and chair to bed with minimal assistance using the least restrictive device within 7 day(s).  4.  Patient will ambulate with minimal assistance for 10 feet with the least restrictive device within 7 day(s).     Outcome: Progressing    PHYSICAL THERAPY TREATMENT    Patient: Siomara Collins (79 y.o. female)  Date: 1/3/2025  Diagnosis: Hypotension [I95.9]  Hypoxia [R09.02]  Elevated troponin [R79.89]  Sepsis with acute organ dysfunction without septic shock, due to unspecified organism, unspecified organ dysfunction type (HCC) [A41.9, R65.20] Hypotension      Precautions:                        ASSESSMENT:  Patient continues to benefit from skilled PT services and is slowly progressing towards goals.          PLAN:  Patient continues to benefit from skilled intervention to address the above impairments.  Continue treatment per established plan of care.    Recommendations for staff mobility and toileting assistance:  Recommend that staff completes patient mobility with assist x2 using Stacey Stedy.    Recommend for next PT session: sit to stand transfers and bed to bedside chair transfers    Recommendation for discharge: (in order for the patient to meet his/her long term goals):   Moderate 
  Problem: Physical Therapy - Adult  Goal: By Discharge: Performs mobility at highest level of function for planned discharge setting.  See evaluation for individualized goals.  Description: FUNCTIONAL STATUS PRIOR TO ADMISSION: The patient was functional at the wheelchair level and required minimal assistancefor transfers to the chair.    HOME SUPPORT PRIOR TO ADMISSION: The patient live in the [facility].    Physical Therapy Goals  Initiated 12/29/2024  1.  Patient will move from supine to sit and sit to supine, scoot up and down, and roll side to side in bed with minimal assistance within 7 day(s).    2.  Patient will perform sit to stand with minimal assistance within 7 day(s).  3.  Patient will transfer from bed to chair and chair to bed with minimal assistance using the least restrictive device within 7 day(s).  4.  Patient will ambulate with minimal assistance for 10 feet with the least restrictive device within 7 day(s).     Outcome: Progressing     PHYSICAL THERAPY TREATMENT    Patient: Siomara Collins (79 y.o. female)  Date: 12/31/2024  Diagnosis: Hypotension [I95.9]  Hypoxia [R09.02]  Elevated troponin [R79.89]  Sepsis with acute organ dysfunction without septic shock, due to unspecified organism, unspecified organ dysfunction type (HCC) [A41.9, R65.20] Hypotension      Precautions:                        ASSESSMENT:  Patient continues to benefit from skilled PT services and is slowly progressing towards goals. Patient continues to need step by step cues for sequencing functional mobility tasks including bed mobility and transfers with Stacey Stedy device. Today she was able to fully stand within transfer device and thus able to transition to chair. Patient with limited standing tolerance despite support and 2 assist due to reports of left UE pain and ongoing bilateral lower extremity weakness placing her at high risk for falls. At this time, patient remains below baseline level of function.      
  Problem: Physical Therapy - Adult  Goal: By Discharge: Performs mobility at highest level of function for planned discharge setting.  See evaluation for individualized goals.  Description: FUNCTIONAL STATUS PRIOR TO ADMISSION: The patient was functional at the wheelchair level and required minimal assistancefor transfers to the chair.    HOME SUPPORT PRIOR TO ADMISSION: The patient live in the [facility].    Physical Therapy Goals  Initiated 12/29/2024  1.  Patient will move from supine to sit and sit to supine, scoot up and down, and roll side to side in bed with minimal assistance within 7 day(s).    2.  Patient will perform sit to stand with minimal assistance within 7 day(s).  3.  Patient will transfer from bed to chair and chair to bed with minimal assistance using the least restrictive device within 7 day(s).  4.  Patient will ambulate with minimal assistance for 10 feet with the least restrictive device within 7 day(s).     Outcome: Progressing   PHYSICAL THERAPY TREATMENT    Patient: Siomara Collins (79 y.o. female)  Date: 12/30/2024  Diagnosis: Hypotension [I95.9]  Hypoxia [R09.02]  Elevated troponin [R79.89]  Sepsis with acute organ dysfunction without septic shock, due to unspecified organism, unspecified organ dysfunction type (HCC) [A41.9, R65.20] Hypotension      Precautions:                        ASSESSMENT:  Patient continues to benefit from skilled PT services and is slowly progressing towards goals. BP elevated throughout session, RN notified. Pt eager to participate with therapy. Requires increased time and Mod/max A x 1 to come to sitting EOB. Performed gentle thera ex for BUE/LE. Pt denies SOB with positional change. One attempt for sit>stand with Stacey Steady and A x 2, increased SOB noted. Assisted back to bed placed in chair position for lunch meal.     Patient Vitals for the past 6 hrs:   Pulse BP Patient Position   12/30/24 1350 85 (!) 157/98 High fowlers post activity   12/30/24 1341 81 
  Problem: Safety - Adult  Goal: Free from fall injury  1/1/2025 0028 by John Painter RN  Outcome: Progressing  12/31/2024 1256 by Ra Rodgers RN  Outcome: Progressing     Problem: Chronic Conditions and Co-morbidities  Goal: Patient's chronic conditions and co-morbidity symptoms are monitored and maintained or improved  Outcome: Progressing     Problem: Discharge Planning  Goal: Discharge to home or other facility with appropriate resources  1/1/2025 0028 by John Painter RN  Outcome: Progressing  12/31/2024 1256 by Ra Rodgers RN  Outcome: Progressing  Flowsheets (Taken 12/31/2024 0800)  Discharge to home or other facility with appropriate resources:   Identify barriers to discharge with patient and caregiver   Arrange for needed discharge resources and transportation as appropriate   Identify discharge learning needs (meds, wound care, etc)     Problem: Pain  Goal: Verbalizes/displays adequate comfort level or baseline comfort level  12/31/2024 1256 by Ra Rodgers RN  Outcome: Progressing     Problem: Cardiovascular - Adult  Goal: Maintains optimal cardiac output and hemodynamic stability  12/31/2024 1256 by Ra Rodgers RN  Outcome: Progressing  Flowsheets (Taken 12/31/2024 0800)  Maintains optimal cardiac output and hemodynamic stability:   Monitor blood pressure and heart rate   Monitor urine output and notify Licensed Independent Practitioner for values outside of normal range   Assess for signs of decreased cardiac output  Goal: Absence of cardiac dysrhythmias or at baseline  12/31/2024 1256 by Ra Rodgers RN  Outcome: Progressing  Flowsheets (Taken 12/31/2024 0800)  Absence of cardiac dysrhythmias or at baseline:   Monitor cardiac rate and rhythm   Assess for signs of decreased cardiac output   Administer antiarrhythmia medication and electrolyte replacement as ordered     Problem: Physical Therapy - Adult  Goal: By Discharge: Performs mobility at highest level of function for planned discharge 
  Problem: Safety - Adult  Goal: Free from fall injury  1/1/2025 0912 by Fermin Carpenter RN  Outcome: Progressing  1/1/2025 0028 by John Painter RN  Outcome: Progressing     Problem: Chronic Conditions and Co-morbidities  Goal: Patient's chronic conditions and co-morbidity symptoms are monitored and maintained or improved  1/1/2025 0912 by Fermin Carpenter RN  Outcome: Progressing  1/1/2025 0028 by John Painter RN  Outcome: Progressing     Problem: Discharge Planning  Goal: Discharge to home or other facility with appropriate resources  1/1/2025 0912 by Fermin Carpenter RN  Outcome: Progressing  1/1/2025 0028 by John Painter RN  Outcome: Progressing     Problem: Pain  Goal: Verbalizes/displays adequate comfort level or baseline comfort level  Outcome: Progressing     Problem: Skin/Tissue Integrity  Goal: Absence of new skin breakdown  Description: 1.  Monitor for areas of redness and/or skin breakdown  2.  Assess vascular access sites hourly  3.  Every 4-6 hours minimum:  Change oxygen saturation probe site  4.  Every 4-6 hours:  If on nasal continuous positive airway pressure, respiratory therapy assess nares and determine need for appliance change or resting period.  Outcome: Progressing     Problem: Cardiovascular - Adult  Goal: Maintains optimal cardiac output and hemodynamic stability  Outcome: Progressing  Goal: Absence of cardiac dysrhythmias or at baseline  Outcome: Progressing     
  Problem: Safety - Adult  Goal: Free from fall injury  12/30/2024 0025 by Monique Montalvo RN  Outcome: Progressing  12/30/2024 0025 by Monique Montalvo RN  Outcome: Progressing  12/29/2024 1154 by Savi Mckeon RN  Outcome: Progressing     Problem: Chronic Conditions and Co-morbidities  Goal: Patient's chronic conditions and co-morbidity symptoms are monitored and maintained or improved  12/30/2024 0025 by Monique Montalvo RN  Outcome: Progressing  12/30/2024 0025 by Monique Montalvo RN  Outcome: Progressing  Flowsheets (Taken 12/29/2024 1942)  Care Plan - Patient's Chronic Conditions and Co-Morbidity Symptoms are Monitored and Maintained or Improved: Monitor and assess patient's chronic conditions and comorbid symptoms for stability, deterioration, or improvement     Problem: Discharge Planning  Goal: Discharge to home or other facility with appropriate resources  Outcome: Progressing  Flowsheets (Taken 12/29/2024 1942)  Discharge to home or other facility with appropriate resources: Identify barriers to discharge with patient and caregiver     Problem: Pain  Goal: Verbalizes/displays adequate comfort level or baseline comfort level  Outcome: Progressing     Problem: Skin/Tissue Integrity  Goal: Absence of new skin breakdown  Description: 1.  Monitor for areas of redness and/or skin breakdown  2.  Assess vascular access sites hourly  3.  Every 4-6 hours minimum:  Change oxygen saturation probe site  4.  Every 4-6 hours:  If on nasal continuous positive airway pressure, respiratory therapy assess nares and determine need for appliance change or resting period.  Outcome: Progressing     Problem: Cardiovascular - Adult  Goal: Maintains optimal cardiac output and hemodynamic stability  Outcome: Progressing  Flowsheets (Taken 12/29/2024 1942)  Maintains optimal cardiac output and hemodynamic stability: Monitor blood pressure and heart rate  Goal: Absence of cardiac dysrhythmias or at baseline  Outcome: 
  Problem: Safety - Adult  Goal: Free from fall injury  Outcome: Progressing     
  Problem: Safety - Adult  Goal: Free from fall injury  Outcome: Progressing     Problem: Chronic Conditions and Co-morbidities  Goal: Patient's chronic conditions and co-morbidity symptoms are monitored and maintained or improved  Outcome: Progressing     Problem: Discharge Planning  Goal: Discharge to home or other facility with appropriate resources  Outcome: Progressing     
  Problem: Safety - Adult  Goal: Free from fall injury  Outcome: Progressing     Problem: Chronic Conditions and Co-morbidities  Goal: Patient's chronic conditions and co-morbidity symptoms are monitored and maintained or improved  Outcome: Progressing     Problem: Discharge Planning  Goal: Discharge to home or other facility with appropriate resources  Outcome: Progressing  Flowsheets (Taken 1/2/2025 5938)  Discharge to home or other facility with appropriate resources: Arrange for interpreters to assist at discharge as needed     Problem: Pain  Goal: Verbalizes/displays adequate comfort level or baseline comfort level  Outcome: Progressing     Problem: Skin/Tissue Integrity  Goal: Absence of new skin breakdown  Description: 1.  Monitor for areas of redness and/or skin breakdown  2.  Assess vascular access sites hourly  3.  Every 4-6 hours minimum:  Change oxygen saturation probe site  4.  Every 4-6 hours:  If on nasal continuous positive airway pressure, respiratory therapy assess nares and determine need for appliance change or resting period.  Outcome: Progressing     
  Problem: Safety - Adult  Goal: Free from fall injury  Outcome: Progressing     Problem: Chronic Conditions and Co-morbidities  Goal: Patient's chronic conditions and co-morbidity symptoms are monitored and maintained or improved  Outcome: Progressing  Care Plan - Patient's Chronic Conditions and Co-Morbidity Symptoms are Monitored and Maintained or Improved:   Monitor and assess patient's chronic conditions and comorbid symptoms for stability, deterioration, or improvement   Collaborate with multidisciplinary team to address chronic and comorbid conditions and prevent exacerbation or deterioration   Update acute care plan with appropriate goals if chronic or comorbid symptoms are exacerbated and prevent overall improvement and discharge     Problem: Discharge Planning  Goal: Discharge to home or other facility with appropriate resources  Outcome: Progressing  Discharge to home or other facility with appropriate resources:   Identify barriers to discharge with patient and caregiver   Identify discharge learning needs (meds, wound care, etc)     Problem: Pain  Goal: Verbalizes/displays adequate comfort level or baseline comfort level  Outcome: Progressing       Problem: Skin/Tissue Integrity  Goal: Absence of new skin breakdown  Description: 1.  Monitor for areas of redness and/or skin breakdown  2.  Assess vascular access sites hourly  3.  Every 4-6 hours minimum:  Change oxygen saturation probe site  4.  Every 4-6 hours:  If on nasal continuous positive airway pressure, respiratory therapy assess nares and determine need for appliance change or resting period.  Outcome: Progressing     
  Problem: Safety - Adult  Goal: Free from fall injury  Outcome: Progressing     Problem: Chronic Conditions and Co-morbidities  Goal: Patient's chronic conditions and co-morbidity symptoms are monitored and maintained or improved  Outcome: Progressing  Flowsheets (Taken 12/30/2024 0800)  Care Plan - Patient's Chronic Conditions and Co-Morbidity Symptoms are Monitored and Maintained or Improved: Monitor and assess patient's chronic conditions and comorbid symptoms for stability, deterioration, or improvement     Problem: Discharge Planning  Goal: Discharge to home or other facility with appropriate resources  Outcome: Progressing  Flowsheets (Taken 12/30/2024 0800)  Discharge to home or other facility with appropriate resources: Identify barriers to discharge with patient and caregiver     Problem: Pain  Goal: Verbalizes/displays adequate comfort level or baseline comfort level  Outcome: Progressing     Problem: Skin/Tissue Integrity  Goal: Absence of new skin breakdown  Description: 1.  Monitor for areas of redness and/or skin breakdown  2.  Assess vascular access sites hourly  3.  Every 4-6 hours minimum:  Change oxygen saturation probe site  4.  Every 4-6 hours:  If on nasal continuous positive airway pressure, respiratory therapy assess nares and determine need for appliance change or resting period.  Outcome: Progressing     Problem: Cardiovascular - Adult  Goal: Maintains optimal cardiac output and hemodynamic stability  Outcome: Progressing  Flowsheets (Taken 12/30/2024 0800)  Maintains optimal cardiac output and hemodynamic stability: Monitor blood pressure and heart rate  Goal: Absence of cardiac dysrhythmias or at baseline  Outcome: Progressing  Flowsheets (Taken 12/30/2024 0800)  Absence of cardiac dysrhythmias or at baseline: Monitor cardiac rate and rhythm     Problem: Physical Therapy - Adult  Goal: By Discharge: Performs mobility at highest level of function for planned discharge setting.  See 
  Problem: Safety - Adult  Goal: Free from fall injury  Outcome: Progressing     Problem: Discharge Planning  Goal: Discharge to home or other facility with appropriate resources  Outcome: Progressing  Flowsheets (Taken 12/31/2024 0800)  Discharge to home or other facility with appropriate resources:   Identify barriers to discharge with patient and caregiver   Arrange for needed discharge resources and transportation as appropriate   Identify discharge learning needs (meds, wound care, etc)     Problem: Pain  Goal: Verbalizes/displays adequate comfort level or baseline comfort level  Outcome: Progressing     Problem: Cardiovascular - Adult  Goal: Maintains optimal cardiac output and hemodynamic stability  Outcome: Progressing  Flowsheets (Taken 12/31/2024 0800)  Maintains optimal cardiac output and hemodynamic stability:   Monitor blood pressure and heart rate   Monitor urine output and notify Licensed Independent Practitioner for values outside of normal range   Assess for signs of decreased cardiac output  Goal: Absence of cardiac dysrhythmias or at baseline  Outcome: Progressing  Flowsheets (Taken 12/31/2024 0800)  Absence of cardiac dysrhythmias or at baseline:   Monitor cardiac rate and rhythm   Assess for signs of decreased cardiac output   Administer antiarrhythmia medication and electrolyte replacement as ordered     Problem: Chronic Conditions and Co-morbidities  Goal: Patient's chronic conditions and co-morbidity symptoms are monitored and maintained or improved  Recent Flowsheet Documentation  Taken 12/31/2024 0800 by Ra Rodgers RN  Care Plan - Patient's Chronic Conditions and Co-Morbidity Symptoms are Monitored and Maintained or Improved:   Monitor and assess patient's chronic conditions and comorbid symptoms for stability, deterioration, or improvement   Collaborate with multidisciplinary team to address chronic and comorbid conditions and prevent exacerbation or deterioration   Update acute care 
Post Discharge PICC and Antibiotic Orders    1.  Diagnosis: Enterococcus avium bacteremia  2.  Antibiotic: Daptomycin 550 mg IV daily through 1/11/2025                        Hold statin while on daptomycin  3.  Routine PICC/ Dong/ Portacath Care including PRN catheter flow management  4.  Weekly labs:   [x] CBC/diff/platelets   [x] BUN/Creatinine   [x] CPK   [x] AST/TotalBilirubin/AlkalinePhosphatase   [] CRP   []Trough Vancomycin level goal 15-20  5.  Fax lab to [phone]  Call Critical Lab Values to [phone]  6.  May send to IR for line evaluation or replacement [phone]  7.  Home Health to pull PIC line at end of therapy or send to IR for Dong removal  8.  Allergies:    Allergies   Allergen Reactions    Levaquin [Levofloxacin] Itching, Dermatitis and Rash    Statins Myalgia     Myalgia with  multiple statins  Takes pravachol     Propoxyphene Other (See Comments)     \"I see worms\"    Codeine Rash     Rash on thighs    Metformin Diarrhea    Penicillins Rash    Sulfa Antibiotics Rash         Jerry Briones, DO     
Progressing     Problem: Cardiovascular - Adult  Goal: Maintains optimal cardiac output and hemodynamic stability  Recent Flowsheet Documentation  Taken 12/29/2024 0800 by Savi Mckeon RN  Maintains optimal cardiac output and hemodynamic stability: Monitor blood pressure and heart rate  12/29/2024 0151 by Jonathon Adam RN  Outcome: Progressing  Goal: Absence of cardiac dysrhythmias or at baseline  Recent Flowsheet Documentation  Taken 12/29/2024 0800 by Savi Mckeon RN  Absence of cardiac dysrhythmias or at baseline: Monitor cardiac rate and rhythm  12/29/2024 0151 by Jonathon Adam RN  Outcome: Progressing     
of care was discussed with: occupational therapist and registered nurse    Patient Education  Education Given To: Patient  Education Provided: Role of Therapy;Energy Conservation;Plan of Care;IADL Safety;Home Exercise Program;Orientation;Precautions;ADL Adaptive Strategies;Equipment;Transfer Training;Mobility Training;Fall Prevention Strategies  Education Method: Printed Information/Hand-outs  Barriers to Learning: None  Education Outcome: Continued education needed    Thank you for this referral.  GABBY VERMA, PT,WCC.  Minutes: 30      Physical Therapy Evaluation Charge Determination   History Examination Presentation Decision-Making   LOW Complexity : Zero comorbidities / personal factors that will impact the outcome / POC LOW Complexity : 1-2 Standardized tests and measures addressing body structure, function, activity limitation and / or participation in recreation  LOW Complexity : Stable, uncomplicated  AM-PAC  LOW    Based on the above components, the patient evaluation is determined to be of the following complexity level: Low

## 2025-01-03 NOTE — PROGRESS NOTES
Teri Cisneros, [facility]  [phone] office  [phone] voicemail     Gastroenterology Progress Note    December 30, 2024  Admit Date: 12/27/2024         Narrative Assessment and Plan   79-year-old female presenting to the hospital with low-level sinus tachycardia, fluid responsive hypotension, dry cough consistent with known interstitial lung disease, malaise, fatigue, no fevers, no GI distress symptoms found to have gram-positive cocci on 2 blood cultures after recent cardiac permanent pacemaker placement concerning for infectious causation, GI consulted for abnormal liver chemistries from previously normal baseline now rapidly trending towards normal with ultrasound revealing benign-appearing gallbladder, CBD of 8 mm, patent portal vein, mild hepatic steatosis; CT abdomen pelvis with mild hepatic steatosis, contracted but benign-appearing gallbladder, patent portal vein, no focal liver lesions.  LFTs rapidly improving on antimicrobial therapy in conjunction with normalizing vital signs, resolved leukocytosis, tolerance of a full regular diet with regular bowel habits.  Patient currently on IV vancomycin and IV ceftriaxone to cover skin randy and lung randy with nonspecific findings on chest x-ray.    Rec:    continue to trend LFTs toward normal on a nonemergent basis with no suspicion of significant chronic liver disease or hepatic decompensation, patent portal vein, no focal liver lesions, no concerning biliary abnormalities with suspicion that this is likely related to recent bacteremia   Solid material noted in the stomach tolerating full regular diet  Likely related to Ozempic medication use, can continue for now without obstruction as this appears to be asymptomatic and well-tolerated  Continue bowel regimen such as MiraLAX twice daily and Dulcolax every other night if helpful  Acute thrombocytopenia  Assess for resolution of TTP after treatment for acute infection; at present time 
    Hospitalist Progress Note      NAME:  Siomara Collins   :  1945  MRM:  [id number]    Date/Time: 2024  3:57 PM           Assessment / Plan:     79-year-old  female who has multiple medical problems including history of interstitial pulmonary fibrosis, fatty liver, polymyalgia rheumatica, depression, diabetes, chronic pain. Patient was admitted by me on  for heart block.s/p  pacemaker. Seen by primary care physician Dec 16 who started her on lisinopril as her Bp was high.  Recently diagnosed with urinary tract infection on  and was started on doxycycline.  Today, the patient was feeling nauseated and her blood pressure was low, so she was sent to the emergency room.     Hypotension  AHRF 2/2 Pneumonia   GPC Bacteremia  - P/w Hypotension. Lisinopril resumed OP on 24. XR with Mild patchy opacities in the left lower lobe c/w PNA. CT chest/abd/pelvis unremarkable.    - Initially, hypotension thought to be due to PNA & new HTN  med. Today, blood cultures returned positive and most likely the etiology for her presentation   - Blood culture: both sites (2/4 samples) growing GPC. Source unknown. Urine is GNR with proteus.  May have PNA? Recent pacemaker placement concerning. Will repeat cultures then start vanc. ID consulted. TTE shows EF of 45-50% without vegetation noted, will likely need BUTCH.  Plan for Thursday.   - Urine culture: > 100K GNR but likely colonization.   - Sputum culture pending   - vanc per ID  - Hypotension resolved. Resume coreg, continue to hold ace and bumex, monitor BP and resume as appropriate   - Encourage PO intake     Recurrent UTI   - Urine culture > 100K proteus. ID suspects colonization.   - Recurrent infection, 13 infections this past year (most commonly klebsiella). History of ESBL in September. Discussed with ID.   - ID following     CHFrEF: new diagnosis.  Mildly reduced. EF 45-50%  --cardiology following. May start GDMT per their recs. 
    Hospitalist Progress Note      NAME:  Siomara Collins   :  1945  MRM:  [id number]    Date/Time: 2024  7:54 AM           Assessment / Plan:     79-year-old  female who has multiple medical problems including history of interstitial pulmonary fibrosis, fatty liver, polymyalgia rheumatica, depression, diabetes, chronic pain. Patient was admitted by me on  for heart block.s/p  pacemaker. Seen by primary care physician Dec 16 who started her on lisinopril as her Bp was high.  Recently diagnosed with urinary tract infection on  and was started on doxycycline.  Today, the patient was feeling nauseated and her blood pressure was low, so she was sent to the emergency room.     Hypotension  AHRF 2/2 Pneumonia   GPC Bacteremia  - P/w Hypotension. Lisinopril resumed OP on 24. XR with Mild patchy opacities in the left lower lobe c/w PNA. CT chest/abd/pelvis unremarkable.    - Initially, hypotension thought to be due to PNA & new HTN  med. Today, blood cultures returned positive and most likely the etiology for her presentation   - Blood culture: both sites (2/4 samples) growing GPC. Source unknown. Urine is GNR. May have PNA? Recent pacemaker placement concerning. Will repeat cultures then start vanc. ID consulted. TTE ordered, will likely need BUTCH  - Urine culture: > 100K GNR  - Sputum culture pending   - On rocephin and vanc  - Hypotension resolved. Resume coreg, continue to hold ace and bumex, monitor BP and resume as appropriate   - Encourage PO intake     Recurrent UTI   - Urine culture > 100K GNR  - Recurrent infection, 13 infections this past year (most commonly klebsiella). History of ESBL in September. Discussed with TERESA Spencer to continue rocephin for now  - ID following     Nausea with no vomiting  Elevated LFTs    Hepatic steatosis   - No abdominal pain.    - Elevated T bili, ALT, AST. R factor 4.4 c/w mixed injury   - RUQ US with hepatic steatosis. CT 
    Hospitalist Progress Note      NAME:  Siomara Collins   :  1945  MRM:  [id number]    Date/Time: 2024  8:19 AM           Assessment / Plan:     79-year-old  female who has multiple medical problems including history of interstitial pulmonary fibrosis, fatty liver, polymyalgia rheumatica, depression, diabetes, chronic pain. Patient was admitted by me on  for heart block.s/p  pacemaker. Seen by primary care physician Dec 16 who started her on lisinopril as her Bp was high.  Recently diagnosed with urinary tract infection on  and was started on doxycycline.  Today, the patient was feeling nauseated and her blood pressure was low, so she was sent to the emergency room.     Hypotension  AHRF 2/2 Pneumonia   - Lisinopril resumed OP on 24. XR with Mild patchy opacities in the left lower lobe. CT chest/abd/pelvis unremarkable.  No WBC or fever. UA negative. Elevated LFTs as below. Covid/flu negatie  - Exact etiology is not clear. Potentially PNA vs poor Po intake vs new medication  - Urine and blood culture pending   - Sputum culture pending   - On rocephin/azithromycin   - Hold ace, coreg, and bumex   - S/p IVF with improvement. BP now stable   - Encourage PO intake     Nausea with no vomiting  Elevated LFTs    Hepatic steatosis   - No abdominal pain.    - Elevated T bili, ALT, AST. R factor 4.4 c/w mixed injury   - RUQ US with hepatic steatosis. CT chest/abd/pelvis unremarkable.   - Hepatitis panel negative (10/24), repeat pending   - GI consulted, suspect shock liver in setting of hypotension in setting of underlying VINCENT  - LFTs improving today     Recent history of urinary tract infection - POA   - No significant evidence of urinary tract infection on her current UA.    Polymyalgia Rheumatica - POA   - On prednisone 30 mg QD at home   - On high dose steroids for acute illness, will resume home regimen tomorrow with NPH 55 U  - Continue singulair     Recent pacemaker 
  TRANSFER - IN REPORT:    Verbal report received from NP/chart review(name) on Siomara Collins  being received from [facility](unit) for ordered procedure      Report consisted of patient’s Situation, Background, Assessment and   Recommendations(SBAR).     Information from the following report(s) Adult Overview, MAR, Recent Results, Pre Procedure Checklist, and Procedure Verification was reviewed with the receiving nurse.    Opportunity for questions and clarification was provided.      Assessment completed upon patient’s arrival to unit and care assume        
 [id number]    
0700: Bedside and Verbal shift change report given to YUDELKA Swan (oncoming nurse) by YUDELKA Lopez (offgoing nurse). Report included the following information Nurse Handoff Report, Adult Overview, Recent Results, Cardiac Rhythm ventricular paced, and Neuro Assessment.     
Accessed chart to take critical lab result for primary nurse.  
Cardiology Update:     Discussed again with pt plans for BUTCH today around 11:30 am, pt agrees to proceed.   
Gastroenterology attending progress note    Subjective  Patient tolerating a regular diabetic diet, no upper GI distress symptoms, no fevers or chills, regular bowel habits    Objective  .  Vitals:    12/29/24 1532   BP: (!) 144/93   Pulse: 81   Resp: 18   Temp: 99.3 °F (37.4 °C)   SpO2: 96%   General-comfortable, conversant, obese, lying in bed  HEENT-calvarium atraumatic, sclera anicteric, acute memories moist  Chest-normal respiratory excursion bilaterally  Abdomen-plethoric but soft, nondistended, nontender    Clinical data review  Laboratory data  Blood cultures 12/27-gram-positive cocci in clusters Enterococcus, urine culture gram-negative rods over 100,000,  12/29-normal-appearing electrolytes, preserved normal range renal function, albumin 2.6, alk phos 152 and decreasing, AST 53 and rapidly normalizing,  and rapidly improving, T. bili 0.5, lipase 49, WBC count 9.1 normalized from 12.3 on 12/27, hemoglobin 12.3, platelet count 140 7K  12/11-INR 1.1    CT abdomen pelvis with IV and oral contrast this admission-normal size liver, mild hepatic steatosis, patent portal vein, normal-appearing large intrahepatic extrahepatic biliary anatomy, contracted gallbladder with mild increase in enhancement, normal-appearing pancreatic head, extensive solid material within a nondilated stomach, patent pyloric channel, normal-appearing small bowel, non-obstructive large fecal load    Assessment and plan    79-year-old female presenting to the hospital with low-level sinus tachycardia, fluid responsive hypotension, dry cough consistent with known interstitial lung disease, malaise, fatigue, no fevers, no GI distress symptoms found to have gram-positive cocci on 2 blood cultures after recent cardiac permanent pacemaker placement concerning for infectious causation, GI consulted for abnormal liver chemistries from previously normal baseline now rapidly trending towards normal with ultrasound revealing benign-appearing 
[facility] Progress Note  Jerry Briones DO  [phone] Office  [phone] Fax    2025      Assessment & Plan:     Enterococcus avium bacteremia.  Source unclear.  Urine growing gram-negative rods.  CT abdomen pelvis with no acute intra-abdominal process.  Repeat blood cultures NGSF.  BUTCH negative for vegetation.  Plan discharge on daptomycin through 2025.  Patient will need surveillance blood cultures once antibiotics are complete.  She may be discharged with midline.  IV antibiotic orders are in the chart.  Patient will need first dose of daptomycin prior to discharge  Recent pacemaker placement.  BUTCH negative for vegetation.     Proteus mirabilis isolated from urine.  UA bland.  Suspect colonization.  No further workup or treatment planned  Left lower lobe infiltrates.  Low suspicion for pneumonia with no pulmonary complaints  Elevated liver enzymes.  CT abdomen pelvis and right upper quadrant ultrasound negative for any biliary tract abnormalities.   GI following  Recent pacemaker placement.     Obesity.  BMI 36  Multiple antibiotic allergies including levofloxacin, penicillin, and sulfa antibiotics  Polymyalgia rheumatica.  Patient on prednisone 30 mg daily  Chronic immunosuppression.  Due to above  Poorly controlled diabetes mellitus.   Hemoglobin A1c 10.5        Vancomycin      Subjective:     No new complaints    Objective:     Vitals: /64   Pulse 76   Temp 97.9 °F (36.6 °C) (Oral)   Resp 16   Ht 1.6 m (5' 3\")   Wt 93.4 kg (206 lb)   SpO2 93%   BMI 36.49 kg/m²      Tmax:  Temp (24hrs), Av.9 °F (36.6 °C), Min:97.7 °F (36.5 °C), Max:98.2 °F (36.8 °C)      Exam:   Patient is intubated:  no    Physical Examination:   General:  Alert, cooperative, no distress   Head:  Normocephalic, atraumatic.   Eyes:  Conjunctivae clear   Neck: Supple       Lungs:   No distress   Chest wall:     Heart:  Steri-Strips in place over pacemaker site   Abdomen:   Nondistended 
[facility] Progress Note  Jerry Briones DO  [phone] Office  [phone] Fax    2024      Assessment & Plan:     Enterococcus avium bacteremia.  Source unclear.  Urine growing gram-negative rods.  CT abdomen pelvis with no acute intra-abdominal process.  Repeat blood culture sent today.  Will need workup for endocarditis in setting of recent pacemaker placement.  Continue vancomycin.  Recent pacemaker placement.  Concern for endocarditis in light of recent pacemaker placement.  No cardiac murmur.  Pacemaker site unremarkable although the Steri-Strips remain in place.  TTE pending.  Patient will need BUTCH   Proteus mirabilis isolated from urine.  UA bland.  Suspect colonization.  No further workup or treatment planned  Left lower lobe infiltrates.  Low suspicion for pneumonia with no pulmonary complaints  Elevated liver enzymes.  CT abdomen pelvis and right upper quadrant ultrasound negative for any biliary tract abnormalities.   GI following  Recent pacemaker placement.  This is highly concerning in setting of GPC bacteremia.  Cardiology consulted for BUTCH   Obesity.  BMI 36  Multiple antibiotic allergies including levofloxacin, penicillin, and sulfa antibiotics  Polymyalgia rheumatica.  Patient on prednisone 30 mg daily  Chronic immunosuppression.  Due to above  Poorly controlled diabetes mellitus.   Hemoglobin A1c 10.5        Vancomycin      Subjective:     No new complaints    Objective:     Vitals: BP (!) 164/89   Pulse 80   Temp 98.2 °F (36.8 °C) (Oral)   Resp 18   Ht 1.6 m (5' 3\")   Wt 93.4 kg (206 lb)   SpO2 95%   BMI 36.49 kg/m²      Tmax:  Temp (24hrs), Av.2 °F (36.8 °C), Min:97.3 °F (36.3 °C), Max:99.3 °F (37.4 °C)      Exam:   Patient is intubated:  no    Physical Examination:   General:  Alert, cooperative, no distress   Head:  Normocephalic, atraumatic.   Eyes:  Conjunctivae clear   Neck: Supple       Lungs:   No distress   Chest wall:     Heart:  
[facility] Progress Note  Jerry Briones DO  [phone] Office  [phone] Fax    2024      Assessment & Plan:     Enterococcus avium bacteremia.  Source unclear.  Urine growing gram-negative rods.  CT abdomen pelvis with no acute intra-abdominal process.  Repeat blood cultures NGSF.   Will need workup for endocarditis in setting of recent pacemaker placement.  BUTCH planned for 1/2 .  Recent pacemaker placement.  Concern for endocarditis in light of recent pacemaker placement.  No cardiac murmur.  Pacemaker site unremarkable although the Steri-Strips remain in place.  TTE negative for vegetation.  BUTCH planned as above   Proteus mirabilis isolated from urine.  UA bland.  Suspect colonization.  No further workup or treatment planned  Left lower lobe infiltrates.  Low suspicion for pneumonia with no pulmonary complaints  Elevated liver enzymes.  CT abdomen pelvis and right upper quadrant ultrasound negative for any biliary tract abnormalities.   GI following  Recent pacemaker placement.  This is highly concerning in setting of GPC bacteremia.  Cardiology consulted for BUTCH   Obesity.  BMI 36  Multiple antibiotic allergies including levofloxacin, penicillin, and sulfa antibiotics  Polymyalgia rheumatica.  Patient on prednisone 30 mg daily  Chronic immunosuppression.  Due to above  Poorly controlled diabetes mellitus.   Hemoglobin A1c 10.5        Vancomycin      Subjective:     No new complaints    Objective:     Vitals: BP (!) 153/97   Pulse 73   Temp 98.2 °F (36.8 °C) (Oral)   Resp 15   Ht 1.6 m (5' 3\")   Wt 93.4 kg (206 lb)   SpO2 97%   BMI 36.49 kg/m²      Tmax:  Temp (24hrs), Av.1 °F (36.7 °C), Min:97.9 °F (36.6 °C), Max:98.4 °F (36.9 °C)      Exam:   Patient is intubated:  no    Physical Examination:   General:  Alert, cooperative, no distress   Head:  Normocephalic, atraumatic.   Eyes:  Conjunctivae clear   Neck: Supple       Lungs:   No distress   Chest wall:   
[facility] Progress Note  Jerry Briones DO  [phone] Office  [phone] Fax    1/3/2025      Assessment & Plan:     Enterococcus avium bacteremia.  Source unclear.  Urine growing gram-negative rods.  CT abdomen pelvis with no acute intra-abdominal process.  Repeat blood cultures NGSF.  BUTCH negative for vegetation.  Plan discharge on daptomycin through 2025.  Will need to hold statin while on daptomycin..  Patient will need surveillance blood cultures once antibiotics are complete.  She has scheduled follow-up with me in clinic on 2025 at 2 PM.  She may be discharged with midline.  IV antibiotic orders are in the chart.    Recent pacemaker placement.  BUTCH negative for vegetation.     Proteus mirabilis isolated from urine.  UA bland.  Suspect colonization.  No further workup or treatment planned  Left lower lobe infiltrates.  Low suspicion for pneumonia with no pulmonary complaints  Elevated liver enzymes.  CT abdomen pelvis and right upper quadrant ultrasound negative for any biliary tract abnormalities.   GI following  Recent pacemaker placement.     Obesity.  BMI 36  Multiple antibiotic allergies including levofloxacin, penicillin, and sulfa antibiotics  Polymyalgia rheumatica.  Patient on prednisone 30 mg daily  Chronic immunosuppression.  Due to above  Poorly controlled diabetes mellitus.   Hemoglobin A1c 10.5        Daptomycin 1/3-      Subjective:     No new complaints    Objective:     Vitals: BP (!) 166/107   Pulse 67   Temp 97.5 °F (36.4 °C) (Oral)   Resp 17   Ht 1.6 m (5' 3\")   Wt 93.4 kg (206 lb)   SpO2 96%   BMI 36.49 kg/m²      Tmax:  Temp (24hrs), Av.9 °F (36.6 °C), Min:97.5 °F (36.4 °C), Max:98.1 °F (36.7 °C)      Exam:   Patient is intubated:  no    Physical Examination:   General:  Alert, cooperative, no distress   Head:  Normocephalic, atraumatic.   Eyes:  Conjunctivae clear   Neck: Supple       Lungs:   No distress   Chest wall:     Heart:  
Physical and Occupational Therapy Note:    Patient off the floor for BUTCH at this time, unavailable for therapy interventions, will defer and continue to follow.     Lizbet Hankins, PT, DPT   
Received report from ED nurseTerese who reviewed patients history, reason for admission and plan for admission into IMCU bed 330. Awaiting arrival.  YUDELKA Dumont  
Spiritual Health History and Assessment/Progress Note  [facility]    Rituals, Rites and Sacraments,  ,  ,      Name: Simoara Collins MRN: [id number]    Age: 79 y.o.     Sex: female   Language: English   Taoist: Quaker   Hypotension     Date: 12/31/2024            Total Time Calculated: 5 min              Spiritual Assessment continued in [facility] B3 INTERMEDIATE CARE UNIT        Referral/Consult From: Clergy/   Encounter Overview/Reason: Rituals, Rites and Sacraments  Service Provided For: Patient    Amy, Belief, Meaning:   Patient is connected with a amy tradition or spiritual practice  Family/Friends No family/friends present      Importance and Influence:  Patient has spiritual/personal beliefs that influence decisions regarding their health  Family/Friends No family/friends present    Community:  Patient Other: unable to attend [employer]  Family/Friends No family/friends present    Assessment and Plan of Care:     Patient Interventions include: Provided sacramental/[employer] ritual  Family/Friends Interventions include: No family/friends present    Patient Plan of Care: Spiritual Care available upon further referral  Family/Friends Plan of Care: Spiritual Care available upon further referral    Electronically signed by Chaplain BASILIO on 12/31/2024 at 2:44 PM     [facility] visit.  Mrs. Collins is Quaker. She used to attend [facility] [employer] but due to her living in an assisted living facility, she is unable to attend.  There are no Quaker services where she lives so she was grateful for the opportunity to pray and receive communion.     Sr. CAMRYN Singh, RN, ACSW, LCSW   Page:  [phone]  
Spiritual Health History and Assessment/Progress Note  [facility]    Rituals, Rites and Sacraments,  ,  ,      Name: Siomara Collins MRN: [id number]    Age: 79 y.o.     Sex: female   Language: English   Worship: Bahai   Hypotension     Date: 1/2/2025            Total Time Calculated: 5 min              Spiritual Assessment continued in [facility] B3 INTERMEDIATE CARE UNIT        Referral/Consult From: Clergy/   Encounter Overview/Reason: Rituals, Rites and Sacraments  Service Provided For: Patient    Amy, Belief, Meaning:   Patient is connected with a amy tradition or spiritual practice  Family/Friends No family/friends present      Importance and Influence:  Patient has spiritual/personal beliefs that influence decisions regarding their health  Family/Friends No family/friends present    Community:  Patient is connected with a spiritual community  Family/Friends No family/friends present    Assessment and Plan of Care:     Patient Interventions include: Provided sacramental/Bahai ritual  Family/Friends Interventions include: No family/friends present    Patient Plan of Care: Spiritual Care available upon further referral  Family/Friends Plan of Care: Spiritual Care available upon further referral    Electronically signed by Chaplain BASILIO on 1/2/2025 at 3:33 PM     [employer] [facility] visit attempted.  Mrs. Collins was sound asleep.  Prayer for spiritual communion offered for her well-being.     Sr. CAMRYN Singh, RN, ACSW, LCSW   Page:  [phone]  
Spiritual Health History and Assessment/Progress Note  [facility]    Initial Encounter,  ,  ,      Name: Siomara Collins MRN: [id number]    Age: 79 y.o.     Sex: female   Language: English   Zoroastrianism: Buddhist   Hypotension     Date: 12/31/2024            Total Time Calculated: 19 min              Spiritual Assessment began in [facility] B3 INTERMEDIATE CARE UNIT        Referral/Consult From: Rounding   Encounter Overview/Reason: Initial Encounter  Service Provided For: Patient    Amy, Belief, Meaning:   Patient is connected with a amy tradition or spiritual practice  Family/Friends No family/friends present      Importance and Influence:  Patient has spiritual/personal beliefs that influence decisions regarding their health  Family/Friends No family/friends present    Community:  Patient feels well-supported. Support system includes: Children  Family/Friends No family/friends present    Assessment and Plan of Care:   Patient Interventions include: Facilitated expression of thoughts and feelings and Provided sacramental/Nondenominational ritual  Family/Friends Interventions include: No family/friends present    Patient Plan of Care: Spiritual Care available upon further referral  Family/Friends Plan of Care: No family/friends present    Chart review. Met and conversed with patient. Patient is of the Buddhist Jain. Patient Jain is important to her. Patient's Jain influences her morals, values, and ethics. Patient lives by herself. Patient used to attend [facility]. Patient stated that she is tired and weary. Patient is not , has two kids, and three grand children. Patient shared medical issues and historical records. Provided active listening, compassion, and prayers of support for the patient. Patient appreciated the visitation and the spiritual support offered. Please contact spiritual health services for further assistance needed.    Electronically signed by Gregory 
TRANSFER - OUT REPORT:    Verbal report given to YUDELKA Richmond(name) on Siomara Collins being transferred to [facility](unit) for routine post-op       Report consisted of patient's Situation, Background, Assessment and   Recommendations(SBAR).     Information from the following report(s) Nurse Handoff Report, MAR, Recent Results, and Event Log was reviewed with the receiving nurse.    Opportunity for questions and clarification was provided.      Patient transported with:   Monitor  Tech    
TTE performed.    Difficult windows.    No obvious vegetations.      Full report to follow.    
VAT- Acknowledged Midline order for Daptomycin IV daily through 1/11/25. Midline is not a central line and may stay in place for up to 30 days. Plan for placement today. Please call with any questions or concerns #[phone].   
VAT- Went to place midline cathter. Pt refused and stated that she can not have a midline due to a bi lateral mastectomy. Pt also has problems with lymphedema. Spoke with Dr. Roberts and placed orders for Dong cathter for antibiotic therapy. Spoke with angio team about Dong placement. Call out to speak to case management.   1413- [facility] spoke with Ruth JHA and she stated that a PIV can be placed when she arrives. Spoke with Dr. Roberts and he in agreement with plan of care to discharge pt and receive antibiotic Daptomycin therapy via a PIV at SNF/Rehab through 1/11/24. Plan of care discussed with Rosalia in case management.   
Vancomycin level, drawn 10.5 hours post-dose, was 15.7 mcg/ml. This predicts an AUC of 485 and is within goal. Will continue same dosing.  
[facility] Dosing Services: Antimicrobial Stewardship Daily Doc  Consult for antibiotic dosing of Vancomycin by Dr. Prince  Indication: Bloodstream Infection  Day of Therapy: 1    Ht Readings from Last 1 Encounters:   12/27/24 1.6 m (5' 3\")        Wt Readings from Last 1 Encounters:   12/27/24 93.5 kg (206 lb 3.2 oz)      Vancomycin therapy:  Loading dose: Vancomycin 2250 mg x1 dose given 12/29 @ 0958  Maintenance dose: Vancomycin 1250 mg IV every 18 hours   Dose calculated to approximate a           a. Target AUC/STEPHIE of 400-600          b. Trough of 15-20 mcg/mL   Last level:  mcg/mL    A/Plan:  CBC, BMP daily  Will order scheduled vancomycin 1250 mg IV every 18 (per insight rx predicts , Tr 16, T1/2 = 11.9 hours)  Plan to order vancomycin level within 48 hours following administration of first maintenance dose (not yet ordered)    Dose administration notes: Doses given appropriately as scheduled      Other Antimicrobial   (not dosed by pharmacist) Rocephin 2g q 24h  Azithromycin 500 mg q24h   Cultures 12/29 Blood Cx: in process  12/29 Blood Cx: in process  12/27 Urine Cx: GNRs (> 100,000 colonies) - prelim  12/27 Blood Cx: GPC in pairs in 1/2 bottles - prelim  12/27 Blood Cx: GPC in pairs in 1/2 bottles - prelim     Serum Creatinine Lab Results   Component Value Date/Time    CREATININE 0.69 12/29/2024 05:31 AM          Creatinine Clearance Estimated Creatinine Clearance: 74 mL/min (based on SCr of 0.69 mg/dL).     Temp Temp: 98.1 °F (36.7 °C) (Oral)       WBC Lab Results   Component Value Date/Time    WBC 9.1 12/29/2024 05:31 AM          Procalcitonin Lab Results   Component Value Date/Time    PROCAL 1.35 12/27/2024 02:11 PM      For Antifungals, Metronidazole and Nafcillin: Lab Results   Component Value Date/Time     12/29/2024 05:31 AM    AST 53 12/29/2024 05:31 AM        Pharmacist: Chance Sheikh, [facility]  [phone]    
16   Temp: 97.9 °F (36.6 °C)   SpO2: 93%     SpO2 Readings from Last 6 Encounters:   01/02/25 93%   12/18/24 99%   12/16/24 95%   11/26/24 99%   11/15/24 92%   10/24/24 97%          Intake/Output Summary (Last 24 hours) at 1/2/2025 1609  Last data filed at 1/2/2025 0358  Gross per 24 hour   Intake --   Output 500 ml   Net -500 ml          Exam:     Physical Exam:     Gen:  Well-developed, well-nourished, in no acute distress  HEENT:  Pink conjunctivae, hearing intact to voice, moist mucous membranes  Neck:  Supple  Resp:  No accessory muscle use, clear breath sounds without wheezes rales or rhonchi  Card:  No murmurs, normal S1, S2 without thrills, bruits or peripheral edema  Abd:  Soft, non-tender, non-distended  Skin:  No rashes or ulcers, skin turgor is good  Neuro:  Cranial nerves 3-12 are grossly intact, follows commands appropriately  Psych:  Good insight, oriented to person, place and time, alert          Medications Reviewed: (see below)    Lab Data Reviewed: (see below)    ______________________________________________________________________    Medications:     Current Facility-Administered Medications   Medication Dose Route Frequency    insulin lispro (HUMALOG,ADMELOG) injection vial 12 Units  12 Units SubCUTAneous TID WC    insulin glargine (LANTUS) injection vial 17 Units  17 Units SubCUTAneous Nightly    [Held by provider] insulin NPH (HumuLIN N;NovoLIN N) injection vial 28 Units  28 Units SubCUTAneous QAM    acetaminophen (TYLENOL) tablet 650 mg  650 mg Oral Q4H PRN    lidocaine 4 % external patch 1 patch  1 patch TransDERmal Daily    vancomycin (VANCOCIN) 1,250 mg in sodium chloride 0.9 % 250 mL IVPB ([id number])  1,250 mg IntraVENous Q18H    ipratropium 0.5 mg-albuterol 2.5 mg (DUONEB) nebulizer solution 1 Dose  1 Dose Inhalation Q4H PRN    predniSONE (DELTASONE) tablet 30 mg  30 mg Oral Daily    [Held by provider] rosuvastatin (CRESTOR) tablet 20 mg  20 mg Oral Nightly    carvedilol (COREG) tablet 
Dose  1 Dose Inhalation Q4H PRN    predniSONE (DELTASONE) tablet 30 mg  30 mg Oral Daily    [Held by provider] rosuvastatin (CRESTOR) tablet 20 mg  20 mg Oral Nightly    carvedilol (COREG) tablet 6.25 mg  6.25 mg Oral BID WC    [Held by provider] bumetanide (BUMEX) tablet 1 mg  1 mg Oral Daily    [Held by provider] lisinopril (PRINIVIL;ZESTRIL) tablet 10 mg  10 mg Oral Daily    gabapentin (NEURONTIN) capsule 300 mg  300 mg Oral Nightly    ondansetron (ZOFRAN) injection 4 mg  4 mg IntraVENous Once    diclofenac sodium (VOLTAREN) 1 % gel 2 g  2 g Topical 4x daily    ezetimibe (ZETIA) tablet 10 mg  10 mg Oral Nightly    montelukast (SINGULAIR) tablet 10 mg  10 mg Oral Nightly    pantoprazole (PROTONIX) tablet 40 mg  40 mg Oral QAM AC    primidone (MYSOLINE) tablet 50 mg  50 mg Oral BID    escitalopram (LEXAPRO) tablet 10 mg  10 mg Oral Daily    glucose chewable tablet 16 g  4 tablet Oral PRN    dextrose bolus 10% 125 mL  125 mL IntraVENous PRN    Or    dextrose bolus 10% 250 mL  250 mL IntraVENous PRN    glucagon injection 1 mg  1 mg SubCUTAneous PRN    dextrose 10 % infusion   IntraVENous Continuous PRN    insulin lispro (HUMALOG,ADMELOG) injection vial 0-8 Units  0-8 Units SubCUTAneous 4x Daily AC & HS    ondansetron (ZOFRAN) injection 4 mg  4 mg IntraVENous Q6H PRN    enoxaparin (LOVENOX) injection 40 mg  40 mg SubCUTAneous Daily            Lab Review:     Recent Labs     12/27/24  1411 12/28/24  0416 12/29/24  0531   WBC 12.3* 7.4 9.1   HGB 13.5 11.6 12.3   HCT 39.5 35.6 36.9    135* 147*     Recent Labs     12/28/24  0416 12/29/24  0531 12/30/24  0315    138 139   K 3.6 4.1 4.2    106 109*   CO2 30 29 26   BUN 19 25* 21*   * 167* 134*     No components found for: \"GLPOC\"        
provider] bumetanide (BUMEX) tablet 1 mg  1 mg Oral Daily    [Held by provider] lisinopril (PRINIVIL;ZESTRIL) tablet 10 mg  10 mg Oral Daily    gabapentin (NEURONTIN) capsule 300 mg  300 mg Oral Nightly    diclofenac sodium (VOLTAREN) 1 % gel 2 g  2 g Topical 4x daily    ezetimibe (ZETIA) tablet 10 mg  10 mg Oral Nightly    montelukast (SINGULAIR) tablet 10 mg  10 mg Oral Nightly    pantoprazole (PROTONIX) tablet 40 mg  40 mg Oral QAM AC    primidone (MYSOLINE) tablet 50 mg  50 mg Oral BID    escitalopram (LEXAPRO) tablet 10 mg  10 mg Oral Daily    glucose chewable tablet 16 g  4 tablet Oral PRN    dextrose bolus 10% 125 mL  125 mL IntraVENous PRN    Or    dextrose bolus 10% 250 mL  250 mL IntraVENous PRN    glucagon injection 1 mg  1 mg SubCUTAneous PRN    dextrose 10 % infusion   IntraVENous Continuous PRN    insulin lispro (HUMALOG,ADMELOG) injection vial 0-8 Units  0-8 Units SubCUTAneous 4x Daily AC & HS    ondansetron (ZOFRAN) injection 4 mg  4 mg IntraVENous Q6H PRN    enoxaparin (LOVENOX) injection 40 mg  40 mg SubCUTAneous Daily            Lab Review:     Recent Labs     12/31/24  0158   WBC 5.5   HGB 12.6   HCT 37.4   *     Recent Labs     12/30/24 0315 12/31/24 0158    138   K 4.2 3.8   * 106   CO2 26 27   BUN 21* 16   * 115*     No components found for: \"GLPOC\"

## 2025-01-09 ENCOUNTER — TELEPHONE (OUTPATIENT)
Age: 80
End: 2025-01-09

## 2025-01-09 NOTE — TELEPHONE ENCOUNTER
Patient is calling because she is trying to get a sooner appointment with the doctor than 2/28/25.Please assist.    [phone]

## 2025-01-14 ENCOUNTER — TELEPHONE (OUTPATIENT)
Facility: CLINIC | Age: 80
End: 2025-01-14

## 2025-01-14 NOTE — TELEPHONE ENCOUNTER
Disp Refills Start End    gabapentin (NEURONTIN) 100 MG capsule  150 capsule 2 8/19/2024 9/15/2024    Sig: Take 1 tablet twice a day and 3 tablets at bedtime.      Last visit 12/16/24  Future appt 1/17/25    Pharmacy [facility]

## 2025-01-15 DIAGNOSIS — Z79.4 CONTROLLED TYPE 2 DIABETES MELLITUS WITH STAGE 2 CHRONIC KIDNEY DISEASE, WITH LONG-TERM CURRENT USE OF INSULIN (HCC): ICD-10-CM

## 2025-01-15 DIAGNOSIS — E11.22 CONTROLLED TYPE 2 DIABETES MELLITUS WITH STAGE 2 CHRONIC KIDNEY DISEASE, WITH LONG-TERM CURRENT USE OF INSULIN (HCC): ICD-10-CM

## 2025-01-15 DIAGNOSIS — N18.2 CONTROLLED TYPE 2 DIABETES MELLITUS WITH STAGE 2 CHRONIC KIDNEY DISEASE, WITH LONG-TERM CURRENT USE OF INSULIN (HCC): ICD-10-CM

## 2025-01-15 DIAGNOSIS — G62.9 NEUROPATHY: Primary | ICD-10-CM

## 2025-01-15 RX ORDER — GABAPENTIN 100 MG/1
CAPSULE ORAL
Qty: 150 CAPSULE | Refills: 2 | Status: SHIPPED | OUTPATIENT
Start: 2025-01-15 | End: 2025-01-17 | Stop reason: SDUPTHER

## 2025-01-15 RX ORDER — GABAPENTIN 300 MG/1
300 CAPSULE ORAL NIGHTLY
Qty: 30 CAPSULE | Refills: 0 | Status: CANCELLED | OUTPATIENT
Start: 2025-01-15 | End: 2025-02-14

## 2025-01-16 ENCOUNTER — OFFICE VISIT (OUTPATIENT)
Facility: CLINIC | Age: 80
End: 2025-01-16
Payer: MEDICARE

## 2025-01-16 VITALS
TEMPERATURE: 97 F | HEART RATE: 91 BPM | HEIGHT: 63 IN | WEIGHT: 194 LBS | RESPIRATION RATE: 20 BRPM | DIASTOLIC BLOOD PRESSURE: 86 MMHG | OXYGEN SATURATION: 96 % | BODY MASS INDEX: 34.38 KG/M2 | SYSTOLIC BLOOD PRESSURE: 138 MMHG

## 2025-01-16 DIAGNOSIS — R78.81 BACTEREMIA DUE TO ENTEROCOCCUS: Primary | ICD-10-CM

## 2025-01-16 DIAGNOSIS — B95.2 BACTEREMIA DUE TO ENTEROCOCCUS: Primary | ICD-10-CM

## 2025-01-16 PROBLEM — M48.02 CERVICAL STENOSIS OF SPINE: Status: RESOLVED | Noted: 2019-03-26 | Resolved: 2025-01-16

## 2025-01-16 PROBLEM — E11.65 POORLY CONTROLLED DIABETES MELLITUS (HCC): Status: RESOLVED | Noted: 2024-09-05 | Resolved: 2025-01-16

## 2025-01-16 PROBLEM — Z79.4 TYPE 2 DIABETES MELLITUS WITH HYPERGLYCEMIA, WITH LONG-TERM CURRENT USE OF INSULIN (HCC): Status: RESOLVED | Noted: 2024-09-05 | Resolved: 2025-01-16

## 2025-01-16 PROBLEM — N18.2 CKD (CHRONIC KIDNEY DISEASE), STAGE II: Status: RESOLVED | Noted: 2017-08-24 | Resolved: 2025-01-16

## 2025-01-16 PROBLEM — I50.9 ACUTE ON CHRONIC CONGESTIVE HEART FAILURE, UNSPECIFIED HEART FAILURE TYPE (HCC): Status: ACTIVE | Noted: 2025-01-16

## 2025-01-16 PROBLEM — I10 HTN (HYPERTENSION), BENIGN: Status: RESOLVED | Noted: 2024-10-21 | Resolved: 2025-01-16

## 2025-01-16 PROBLEM — M51.9 LUMBAR DISC DISEASE: Status: RESOLVED | Noted: 2018-07-11 | Resolved: 2025-01-16

## 2025-01-16 PROBLEM — E11.65 TYPE 2 DIABETES MELLITUS WITH HYPERGLYCEMIA, WITH LONG-TERM CURRENT USE OF INSULIN (HCC): Status: RESOLVED | Noted: 2024-09-05 | Resolved: 2025-01-16

## 2025-01-16 PROBLEM — Z22.358 CARRIER OF EXTENDED SPECTRUM BETA LACTAMASE (ESBL) PRODUCING BACTERIA: Status: RESOLVED | Noted: 2024-07-28 | Resolved: 2025-01-16

## 2025-01-16 PROCEDURE — 3075F SYST BP GE 130 - 139MM HG: CPT | Performed by: INTERNAL MEDICINE

## 2025-01-16 PROCEDURE — 1123F ACP DISCUSS/DSCN MKR DOCD: CPT | Performed by: INTERNAL MEDICINE

## 2025-01-16 PROCEDURE — G8417 CALC BMI ABV UP PARAM F/U: HCPCS | Performed by: INTERNAL MEDICINE

## 2025-01-16 PROCEDURE — G8400 PT W/DXA NO RESULTS DOC: HCPCS | Performed by: INTERNAL MEDICINE

## 2025-01-16 PROCEDURE — 1111F DSCHRG MED/CURRENT MED MERGE: CPT | Performed by: INTERNAL MEDICINE

## 2025-01-16 PROCEDURE — 3079F DIAST BP 80-89 MM HG: CPT | Performed by: INTERNAL MEDICINE

## 2025-01-16 PROCEDURE — 1036F TOBACCO NON-USER: CPT | Performed by: INTERNAL MEDICINE

## 2025-01-16 PROCEDURE — G8428 CUR MEDS NOT DOCUMENT: HCPCS | Performed by: INTERNAL MEDICINE

## 2025-01-16 PROCEDURE — 99213 OFFICE O/P EST LOW 20 MIN: CPT | Performed by: INTERNAL MEDICINE

## 2025-01-16 PROCEDURE — 1125F AMNT PAIN NOTED PAIN PRSNT: CPT | Performed by: INTERNAL MEDICINE

## 2025-01-16 PROCEDURE — 1090F PRES/ABSN URINE INCON ASSESS: CPT | Performed by: INTERNAL MEDICINE

## 2025-01-16 NOTE — PROGRESS NOTES
[facility] Progress Note  Jerry Briones DO  [phone] Office  [phone] Fax    1/16/2025      Assessment & Plan:     Enterococcus avium bacteremia.  Source unclear.   CT abdomen pelvis with no acute intra-abdominal process.  Repeat blood cultures NGSF.  BUTCH negative for vegetation.  Patient completed 2-week course of daptomycin 1/11/2025.  She was given lab slips for surveillance blood cultures to be done early next week.  If blood cultures return positive will need to consider pacemaker removal .    Recent pacemaker placement.  BUTCH negative for vegetation.     Obesity.  BMI 36  Multiple antibiotic allergies including levofloxacin, penicillin, and sulfa antibiotics  Polymyalgia rheumatica.  Patient on prednisone 30 mg daily  Chronic immunosuppression.  Due to above  Poorly controlled diabetes mellitus.   Hemoglobin A1c 10.5          Subjective:     No complaints.  Midline catheter removed.    Objective:     Vitals: /86   Pulse 91   Temp 97 °F (36.1 °C) (Temporal)   Resp 20   Ht 1.6 m (5' 3\")   Wt 88 kg (194 lb)   SpO2 96%   BMI 34.37 kg/m²      Physical Examination:   General:  Alert, cooperative, no distress   Head:  Normocephalic, atraumatic.   Eyes:  Conjunctivae clear   Neck: Supple       Lungs:   No distress.    Chest wall:  Left chest wall pacer site unremarkable   Heart:  Regular rate and rhythm.  3/6 ISHAAN   Abdomen:   non-distended   Extremities: Moves all.     Skin: No acute rash on exposed skin   Neurologic: CNII-XII intact. Normal strength     Labs:        Invalid input(s): \"ITNL\"   No results for input(s): \"CPK\", \"CKMB\" in the last 72 hours.    Invalid input(s): \"TROIQ\"  No results for input(s): \"NA\", \"K\", \"CL\", \"CO2\", \"BUN\", \"GLU\", \"PHOS\", \"MG\", \"WBC\", \"HGB\", \"HCT\", \"PLT\" in the last 72 hours.    Invalid input(s): \"CREA\", \"CA\", \"ALB\"  No results for input(s): \"INR\", \"APTT\" in the last 72 hours.    Invalid input(s): \"PTP\"  Needs: urine analysis, urine

## 2025-01-17 ENCOUNTER — OFFICE VISIT (OUTPATIENT)
Facility: CLINIC | Age: 80
End: 2025-01-17

## 2025-01-17 ENCOUNTER — TELEPHONE (OUTPATIENT)
Facility: CLINIC | Age: 80
End: 2025-01-17

## 2025-01-17 VITALS
HEIGHT: 63 IN | RESPIRATION RATE: 18 BRPM | DIASTOLIC BLOOD PRESSURE: 72 MMHG | OXYGEN SATURATION: 95 % | BODY MASS INDEX: 34.37 KG/M2 | TEMPERATURE: 98.3 F | HEART RATE: 89 BPM | SYSTOLIC BLOOD PRESSURE: 110 MMHG

## 2025-01-17 DIAGNOSIS — N39.0 RECURRENT UTI: ICD-10-CM

## 2025-01-17 DIAGNOSIS — Z79.4 CONTROLLED TYPE 2 DIABETES MELLITUS WITH STAGE 2 CHRONIC KIDNEY DISEASE, WITH LONG-TERM CURRENT USE OF INSULIN (HCC): ICD-10-CM

## 2025-01-17 DIAGNOSIS — B95.2 BACTEREMIA DUE TO ENTEROCOCCUS: ICD-10-CM

## 2025-01-17 DIAGNOSIS — I10 PRIMARY HYPERTENSION: ICD-10-CM

## 2025-01-17 DIAGNOSIS — A41.9 SEPSIS WITHOUT ACUTE ORGAN DYSFUNCTION, DUE TO UNSPECIFIED ORGANISM (HCC): Primary | ICD-10-CM

## 2025-01-17 DIAGNOSIS — Z88.1 ALLERGY TO MULTIPLE ANTIBIOTICS: ICD-10-CM

## 2025-01-17 DIAGNOSIS — J84.9 INTERSTITIAL LUNG DISEASE (HCC): ICD-10-CM

## 2025-01-17 DIAGNOSIS — Z78.9 TRANSITION OF CARE: ICD-10-CM

## 2025-01-17 DIAGNOSIS — E11.22 CONTROLLED TYPE 2 DIABETES MELLITUS WITH STAGE 2 CHRONIC KIDNEY DISEASE, WITH LONG-TERM CURRENT USE OF INSULIN (HCC): ICD-10-CM

## 2025-01-17 DIAGNOSIS — N18.2 CKD (CHRONIC KIDNEY DISEASE) STAGE 2, GFR 60-89 ML/MIN: ICD-10-CM

## 2025-01-17 DIAGNOSIS — I95.9 HYPOTENSION, UNSPECIFIED HYPOTENSION TYPE: ICD-10-CM

## 2025-01-17 DIAGNOSIS — R78.81 BACTEREMIA DUE TO ENTEROCOCCUS: ICD-10-CM

## 2025-01-17 DIAGNOSIS — G62.9 NEUROPATHY: ICD-10-CM

## 2025-01-17 DIAGNOSIS — N18.2 CONTROLLED TYPE 2 DIABETES MELLITUS WITH STAGE 2 CHRONIC KIDNEY DISEASE, WITH LONG-TERM CURRENT USE OF INSULIN (HCC): ICD-10-CM

## 2025-01-17 RX ORDER — GABAPENTIN 300 MG/1
300 CAPSULE ORAL NIGHTLY
Qty: 30 CAPSULE | Refills: 0 | Status: SHIPPED | OUTPATIENT
Start: 2025-01-17 | End: 2025-02-16

## 2025-01-17 RX ORDER — GABAPENTIN 100 MG/1
CAPSULE ORAL
Qty: 150 CAPSULE | Refills: 2 | Status: SHIPPED | OUTPATIENT
Start: 2025-01-17 | End: 2025-02-16

## 2025-01-17 ASSESSMENT — PATIENT HEALTH QUESTIONNAIRE - PHQ9
SUM OF ALL RESPONSES TO PHQ QUESTIONS 1-9: 0
SUM OF ALL RESPONSES TO PHQ9 QUESTIONS 1 & 2: 0
1. LITTLE INTEREST OR PLEASURE IN DOING THINGS: NOT AT ALL
10. IF YOU CHECKED OFF ANY PROBLEMS, HOW DIFFICULT HAVE THESE PROBLEMS MADE IT FOR YOU TO DO YOUR WORK, TAKE CARE OF THINGS AT HOME, OR GET ALONG WITH OTHER PEOPLE: NOT DIFFICULT AT ALL
6. FEELING BAD ABOUT YOURSELF - OR THAT YOU ARE A FAILURE OR HAVE LET YOURSELF OR YOUR FAMILY DOWN: NOT AT ALL
SUM OF ALL RESPONSES TO PHQ QUESTIONS 1-9: 0
SUM OF ALL RESPONSES TO PHQ QUESTIONS 1-9: 0
9. THOUGHTS THAT YOU WOULD BE BETTER OFF DEAD, OR OF HURTING YOURSELF: NOT AT ALL
2. FEELING DOWN, DEPRESSED OR HOPELESS: NOT AT ALL
7. TROUBLE CONCENTRATING ON THINGS, SUCH AS READING THE NEWSPAPER OR WATCHING TELEVISION: NOT AT ALL
3. TROUBLE FALLING OR STAYING ASLEEP: NOT AT ALL
4. FEELING TIRED OR HAVING LITTLE ENERGY: NOT AT ALL
8. MOVING OR SPEAKING SO SLOWLY THAT OTHER PEOPLE COULD HAVE NOTICED. OR THE OPPOSITE, BEING SO FIGETY OR RESTLESS THAT YOU HAVE BEEN MOVING AROUND A LOT MORE THAN USUAL: NOT AT ALL
SUM OF ALL RESPONSES TO PHQ QUESTIONS 1-9: 0
5. POOR APPETITE OR OVEREATING: NOT AT ALL

## 2025-01-17 NOTE — PROGRESS NOTES
Siomara Collins is a 80 y.o. female     Chief Complaint   Patient presents with    1 Month Follow-Up    ROLO       /72 (Site: Left Upper Arm, Position: Sitting, Cuff Size: Large Adult)   Pulse 89   Temp 98.3 °F (36.8 °C) (Oral)   Resp 18   Ht 1.6 m (5' 3\")   SpO2 95%   BMI 34.37 kg/m²     Health Maintenance Due   Topic Date Due    COVID-19 Vaccine (1) Never done    DEXA (modify frequency per FRAX score)  Never done    Shingles vaccine (1 of 2) 02/26/2017    Respiratory Syncytial Virus (RSV) Pregnant or age 60 yrs+ (1 - 1-dose 75+ series) Never done    Flu vaccine (1) 08/01/2024         \"Have you been to the ER, urgent care clinic since your last visit?  Hospitalized since your last visit?\"    YES - When: approximately 15 days ago.  Where and Why: [facility].    “Have you seen or consulted any other health care providers outside of [facility] since your last visit?”    NO

## 2025-01-17 NOTE — TELEPHONE ENCOUNTER
Spoke to Nate at [facility] pharmacy and wants to clarify rx's sent over this afternoon for Gabapentin.  States she got these two days ago from a different source.  Discussed with Dr. Vang and Rx's canceled.

## 2025-01-17 NOTE — PROGRESS NOTES
1 Month Follow-Up and ROLO       HPI:  Siomara Collins is a 80 y.o. year old female who is here for a follow up visit for hospitalization transition of care.   She was last seen by me on 12/16/2024.     Discharged on: 1/3/2025 from [facility] after admission 12/27/2024. Admitted [facility] 1/3/2025 until 1/13/2025 and then discharged to long-term adult care facility    Diagnosis in hospital:   1. Hypotension  2. Type 2 diabetes mellitus with hyperglycemia, with long-term current use of insulin ([facility])  3  Sepsis - Bacteremia - Enterococcus  4.  Pacemaker  5.  Elevated liver enzymes  6.  Allergy to multiple antibiotics  7.  CKD (chronic kidney disease) stage 2, GFR 60-89 ml/min       Complications in hospital: No complications although was hypotensive from the sepsis    Medication changes: Treated with daptomycin twice daily for 2 weeks last dose completed on the 11th.  Insulin dose changed Lantus to be 17 units nightly, insulin NPH to be 24 units in the morning, NovoLog 12 units 3 times daily before meals, discontinued doxycycline, Seroquel, Crestor 20 mg and lidocaine patch    Discharge Summary reviewed.  Today 1/17/2025      She reports the following: Today she is accompanied by her cousin at the visit.  She feels very weak and tired but has noted no fevers or chills.  She currently denies any dysuria and has no abdominal pain, suprapubic angle, back pain or other urinary complaints other than some frequency.  She has noted no nausea/vomiting.  She has no other GI or  complaints.  She notes no chest pain, palpitations, PND, orthopnea or other cardiac respiratory complaints.  She was seen yesterday by Dr. Ruff who saw her from infectious disease standpoint in the hospital and he has ordered blood cultures to be done next week to make sure she is not bacteremic as they have considerations as to the source being possibly the pacemaker which would need to be removed.  She currently notes no new

## 2025-01-18 LAB
ALBUMIN SERPL-MCNC: 3.5 G/DL (ref 3.5–5)
ALBUMIN/GLOB SERPL: 1 (ref 1.1–2.2)
ALP SERPL-CCNC: 112 U/L (ref 45–117)
ALT SERPL-CCNC: 89 U/L (ref 12–78)
ANION GAP SERPL CALC-SCNC: 11 MMOL/L (ref 2–12)
AST SERPL-CCNC: 35 U/L (ref 15–37)
BASOPHILS # BLD: 0.05 K/UL (ref 0–0.1)
BASOPHILS NFR BLD: 0.5 % (ref 0–1)
BILIRUB SERPL-MCNC: 0.5 MG/DL (ref 0.2–1)
BUN SERPL-MCNC: 28 MG/DL (ref 6–20)
BUN/CREAT SERPL: 27 (ref 12–20)
CALCIUM SERPL-MCNC: 9.3 MG/DL (ref 8.5–10.1)
CHLORIDE SERPL-SCNC: 102 MMOL/L (ref 97–108)
CO2 SERPL-SCNC: 25 MMOL/L (ref 21–32)
CREAT SERPL-MCNC: 1.02 MG/DL (ref 0.55–1.02)
DIFFERENTIAL METHOD BLD: ABNORMAL
EOSINOPHIL # BLD: 0.01 K/UL (ref 0–0.4)
EOSINOPHIL NFR BLD: 0.1 % (ref 0–7)
ERYTHROCYTE [DISTWIDTH] IN BLOOD BY AUTOMATED COUNT: 15.1 % (ref 11.5–14.5)
GLOBULIN SER CALC-MCNC: 3.5 G/DL (ref 2–4)
GLUCOSE SERPL-MCNC: 379 MG/DL (ref 65–100)
HCT VFR BLD AUTO: 43.4 % (ref 35–47)
HGB BLD-MCNC: 13.9 G/DL (ref 11.5–16)
IMM GRANULOCYTES # BLD AUTO: 0.07 K/UL (ref 0–0.04)
IMM GRANULOCYTES NFR BLD AUTO: 0.8 % (ref 0–0.5)
LYMPHOCYTES # BLD: 0.5 K/UL (ref 0.8–3.5)
LYMPHOCYTES NFR BLD: 5.4 % (ref 12–49)
MCH RBC QN AUTO: 31.9 PG (ref 26–34)
MCHC RBC AUTO-ENTMCNC: 32 G/DL (ref 30–36.5)
MCV RBC AUTO: 99.5 FL (ref 80–99)
MONOCYTES # BLD: 0.24 K/UL (ref 0–1)
MONOCYTES NFR BLD: 2.6 % (ref 5–13)
NEUTS SEG # BLD: 8.43 K/UL (ref 1.8–8)
NEUTS SEG NFR BLD: 90.6 % (ref 32–75)
NRBC # BLD: 0 K/UL (ref 0–0.01)
NRBC BLD-RTO: 0 PER 100 WBC
PLATELET # BLD AUTO: 207 K/UL (ref 150–400)
PMV BLD AUTO: 10.9 FL (ref 8.9–12.9)
POTASSIUM SERPL-SCNC: 4.6 MMOL/L (ref 3.5–5.1)
PROT SERPL-MCNC: 7 G/DL (ref 6.4–8.2)
RBC # BLD AUTO: 4.36 M/UL (ref 3.8–5.2)
RBC MORPH BLD: ABNORMAL
SODIUM SERPL-SCNC: 138 MMOL/L (ref 136–145)
WBC # BLD AUTO: 9.3 K/UL (ref 3.6–11)

## 2025-01-21 ENCOUNTER — TELEPHONE (OUTPATIENT)
Facility: CLINIC | Age: 80
End: 2025-01-21

## 2025-01-21 NOTE — TELEPHONE ENCOUNTER
Patient called stating [employer] or [employer] stated they faxed a form yesterday for Dr. Joseph to sign and fax back for her scooter.  Please advise patient if form has been received and signed.      P#[phone]

## 2025-01-22 RX ORDER — INSULIN GLARGINE 100 [IU]/ML
INJECTION, SOLUTION SUBCUTANEOUS
Qty: 15 ML | Refills: 5 | Status: ON HOLD | OUTPATIENT
Start: 2025-01-22

## 2025-01-22 NOTE — TELEPHONE ENCOUNTER
RX refill request from the patient/pharmacy. Patient last seen 01/17/2025 with labs, and next appt. scheduled for 01/30/2025  Requested Prescriptions     Pending Prescriptions Disp Refills    insulin glargine (LANTUS SOLOSTAR) 100 UNIT/ML injection pen 15 mL 5     Sig: Take 22 units of Lantus insulin daily    .     S CIWA





- CIWA Score


Nausea/Vomitin


Muscle Tremors: 4-Moderate,w/Arms Extend


Anxiety: 4-Mod. Anxious/Guarded


Agitation: 4-Moderately Restless


Paroxysmal Sweats: 3


Orientation: 0-Oriented


Tacttile Disturbances: 0-None


Auditory Disturbances: 0-None


Visual Disturbances: 0-None


Headache: 0-None Present


CIWA-Ar Total Score: 17





[facility] Progress Note (SOAP)


Subjective: 





Chills, agitated


Objective: 





19 18:20


 Last Vital Signs











Temp Pulse Resp BP Pulse Ox


 


 98.3 F   84   20   151/90    


 


 19 15:07  19 15:30  19 15:30  19 15:07   








 Laboratory Tests











  19





  07:35 07:35 07:35


 


WBC  Cancelled  


 


Corrected WBC (auto)  Cancelled  


 


RBC  Cancelled  


 


Hgb  Cancelled  


 


Hct  Cancelled  


 


MCV  Cancelled  


 


MCH  Cancelled  


 


MCHC  Cancelled  


 


RDW  Cancelled  


 


Plt Count  Cancelled  


 


MPV  Cancelled  


 


Manual Slide Review  Cancelled  


 


Platelet Comment  Cancelled  


 


Sodium   143 


 


Potassium   3.5 


 


Chloride   102 


 


Carbon Dioxide   31 


 


Anion Gap   9 


 


BUN   16 


 


Creatinine   0.9 


 


Creat Clearance w eGFR   > 60 


 


Random Glucose   138 H 


 


Calcium   8.9 


 


Total Bilirubin   0.8 


 


AST   40 H 


 


ALT   43 


 


Alkaline Phosphatase   68 


 


Total Protein   7.2 


 


Albumin   3.9 


 


RPR Titer    Nonreactive








Labs reviewed; CBC reordered in AM


Assessment: 





19 18:21


Withdrawal symptoms


Plan: 





Continue detox


Encouraged PO water hydration

## 2025-01-25 ENCOUNTER — HOSPITAL ENCOUNTER (INPATIENT)
Facility: HOSPITAL | Age: 80
LOS: 3 days | Discharge: NURSING, REHAB OR LONG-TERM CARE | DRG: 690 | End: 2025-01-28
Attending: EMERGENCY MEDICINE | Admitting: HOSPITALIST
Payer: MEDICARE

## 2025-01-25 DIAGNOSIS — N18.2 CONTROLLED TYPE 2 DIABETES MELLITUS WITH STAGE 2 CHRONIC KIDNEY DISEASE, WITH LONG-TERM CURRENT USE OF INSULIN (HCC): ICD-10-CM

## 2025-01-25 DIAGNOSIS — G62.9 NEUROPATHY: ICD-10-CM

## 2025-01-25 DIAGNOSIS — E11.22 CONTROLLED TYPE 2 DIABETES MELLITUS WITH STAGE 2 CHRONIC KIDNEY DISEASE, WITH LONG-TERM CURRENT USE OF INSULIN (HCC): ICD-10-CM

## 2025-01-25 DIAGNOSIS — E86.0 DEHYDRATION: ICD-10-CM

## 2025-01-25 DIAGNOSIS — N30.00 ACUTE CYSTITIS WITHOUT HEMATURIA: Primary | ICD-10-CM

## 2025-01-25 DIAGNOSIS — Z79.4 CONTROLLED TYPE 2 DIABETES MELLITUS WITH STAGE 2 CHRONIC KIDNEY DISEASE, WITH LONG-TERM CURRENT USE OF INSULIN (HCC): ICD-10-CM

## 2025-01-25 DIAGNOSIS — R19.7 DIARRHEA, UNSPECIFIED TYPE: ICD-10-CM

## 2025-01-25 PROBLEM — N30.01 ACUTE CYSTITIS WITH HEMATURIA: Status: ACTIVE | Noted: 2025-01-25

## 2025-01-25 LAB
ALBUMIN SERPL-MCNC: 3 G/DL (ref 3.5–5)
ALBUMIN/GLOB SERPL: 1 (ref 1.1–2.2)
ALP SERPL-CCNC: 85 U/L (ref 45–117)
ALT SERPL-CCNC: 45 U/L (ref 12–78)
ANION GAP SERPL CALC-SCNC: 6 MMOL/L (ref 2–12)
APPEARANCE UR: CLEAR
AST SERPL-CCNC: 30 U/L (ref 15–37)
BACTERIA URNS QL MICRO: ABNORMAL /HPF
BASOPHILS # BLD: 0 K/UL (ref 0–0.1)
BASOPHILS NFR BLD: 0 % (ref 0–1)
BILIRUB SERPL-MCNC: 1.3 MG/DL (ref 0.2–1)
BILIRUB UR QL: NEGATIVE
BUN SERPL-MCNC: 23 MG/DL (ref 6–20)
BUN/CREAT SERPL: 23 (ref 12–20)
CALCIUM SERPL-MCNC: 8.8 MG/DL (ref 8.5–10.1)
CHLORIDE SERPL-SCNC: 103 MMOL/L (ref 97–108)
CO2 SERPL-SCNC: 30 MMOL/L (ref 21–32)
COLOR UR: ABNORMAL
COMMENT:: NORMAL
CREAT SERPL-MCNC: 0.99 MG/DL (ref 0.55–1.02)
DIFFERENTIAL METHOD BLD: ABNORMAL
EOSINOPHIL # BLD: 0 K/UL (ref 0–0.4)
EOSINOPHIL NFR BLD: 0 % (ref 0–7)
EPITH CASTS URNS QL MICRO: ABNORMAL /LPF
ERYTHROCYTE [DISTWIDTH] IN BLOOD BY AUTOMATED COUNT: 15.1 % (ref 11.5–14.5)
GLOBULIN SER CALC-MCNC: 3.1 G/DL (ref 2–4)
GLUCOSE BLD STRIP.AUTO-MCNC: 232 MG/DL (ref 65–117)
GLUCOSE BLD STRIP.AUTO-MCNC: 300 MG/DL (ref 65–117)
GLUCOSE SERPL-MCNC: 259 MG/DL (ref 65–100)
GLUCOSE UR STRIP.AUTO-MCNC: 500 MG/DL
HCT VFR BLD AUTO: 41.4 % (ref 35–47)
HGB BLD-MCNC: 13.6 G/DL (ref 11.5–16)
HGB UR QL STRIP: ABNORMAL
HYALINE CASTS URNS QL MICRO: ABNORMAL /LPF (ref 0–2)
IMM GRANULOCYTES # BLD AUTO: 0 K/UL
IMM GRANULOCYTES NFR BLD AUTO: 0 %
KETONES UR QL STRIP.AUTO: NEGATIVE MG/DL
LACTATE BLD-SCNC: 1.49 MMOL/L (ref 0.4–2)
LACTATE BLD-SCNC: 2.33 MMOL/L (ref 0.4–2)
LACTATE SERPL-SCNC: 1.3 MMOL/L (ref 0.4–2)
LEUKOCYTE ESTERASE UR QL STRIP.AUTO: ABNORMAL
LYMPHOCYTES # BLD: 0.64 K/UL (ref 0.8–3.5)
LYMPHOCYTES NFR BLD: 6 % (ref 12–49)
MAGNESIUM SERPL-MCNC: 1.7 MG/DL (ref 1.6–2.4)
MCH RBC QN AUTO: 32.3 PG (ref 26–34)
MCHC RBC AUTO-ENTMCNC: 32.9 G/DL (ref 30–36.5)
MCV RBC AUTO: 98.3 FL (ref 80–99)
METAMYELOCYTES NFR BLD MANUAL: 2 %
MONOCYTES # BLD: 0.21 K/UL (ref 0–1)
MONOCYTES NFR BLD: 2 % (ref 5–13)
NEUTS BAND NFR BLD MANUAL: 3 % (ref 0–6)
NEUTS SEG # BLD: 9.63 K/UL (ref 1.8–8)
NEUTS SEG NFR BLD: 87 % (ref 32–75)
NITRITE UR QL STRIP.AUTO: POSITIVE
NRBC # BLD: 0 K/UL (ref 0–0.01)
NRBC BLD-RTO: 0 PER 100 WBC
PH UR STRIP: 6.5 (ref 5–8)
PLATELET # BLD AUTO: 187 K/UL (ref 150–400)
PMV BLD AUTO: 10.5 FL (ref 8.9–12.9)
POTASSIUM SERPL-SCNC: 3.5 MMOL/L (ref 3.5–5.1)
PROT SERPL-MCNC: 6.1 G/DL (ref 6.4–8.2)
PROT UR STRIP-MCNC: NEGATIVE MG/DL
RBC # BLD AUTO: 4.21 M/UL (ref 3.8–5.2)
RBC #/AREA URNS HPF: ABNORMAL /HPF (ref 0–5)
RBC MORPH BLD: ABNORMAL
SERVICE CMNT-IMP: ABNORMAL
SERVICE CMNT-IMP: ABNORMAL
SODIUM SERPL-SCNC: 139 MMOL/L (ref 136–145)
SP GR UR REFRACTOMETRY: 1.01 (ref 1–1.03)
SPECIMEN HOLD: NORMAL
UROBILINOGEN UR QL STRIP.AUTO: 0.2 EU/DL (ref 0.2–1)
WBC # BLD AUTO: 10.7 K/UL (ref 3.6–11)
WBC URNS QL MICRO: ABNORMAL /HPF (ref 0–4)

## 2025-01-25 PROCEDURE — 99285 EMERGENCY DEPT VISIT HI MDM: CPT

## 2025-01-25 PROCEDURE — 87088 URINE BACTERIA CULTURE: CPT

## 2025-01-25 PROCEDURE — 87186 SC STD MICRODIL/AGAR DIL: CPT

## 2025-01-25 PROCEDURE — 6370000000 HC RX 637 (ALT 250 FOR IP): Performed by: HOSPITALIST

## 2025-01-25 PROCEDURE — 96365 THER/PROPH/DIAG IV INF INIT: CPT

## 2025-01-25 PROCEDURE — 6360000002 HC RX W HCPCS: Performed by: EMERGENCY MEDICINE

## 2025-01-25 PROCEDURE — 83605 ASSAY OF LACTIC ACID: CPT

## 2025-01-25 PROCEDURE — 85025 COMPLETE CBC W/AUTO DIFF WBC: CPT

## 2025-01-25 PROCEDURE — 2500000003 HC RX 250 WO HCPCS: Performed by: HOSPITALIST

## 2025-01-25 PROCEDURE — 94761 N-INVAS EAR/PLS OXIMETRY MLT: CPT

## 2025-01-25 PROCEDURE — 83735 ASSAY OF MAGNESIUM: CPT

## 2025-01-25 PROCEDURE — 2580000003 HC RX 258: Performed by: HOSPITALIST

## 2025-01-25 PROCEDURE — 87086 URINE CULTURE/COLONY COUNT: CPT

## 2025-01-25 PROCEDURE — 81001 URINALYSIS AUTO W/SCOPE: CPT

## 2025-01-25 PROCEDURE — 87040 BLOOD CULTURE FOR BACTERIA: CPT

## 2025-01-25 PROCEDURE — 82962 GLUCOSE BLOOD TEST: CPT

## 2025-01-25 PROCEDURE — 1100000000 HC RM PRIVATE

## 2025-01-25 PROCEDURE — 96361 HYDRATE IV INFUSION ADD-ON: CPT

## 2025-01-25 PROCEDURE — 2580000003 HC RX 258: Performed by: EMERGENCY MEDICINE

## 2025-01-25 PROCEDURE — 36415 COLL VENOUS BLD VENIPUNCTURE: CPT

## 2025-01-25 PROCEDURE — 80053 COMPREHEN METABOLIC PANEL: CPT

## 2025-01-25 RX ORDER — MAGNESIUM SULFATE IN WATER 40 MG/ML
2000 INJECTION, SOLUTION INTRAVENOUS PRN
Status: DISCONTINUED | OUTPATIENT
Start: 2025-01-25 | End: 2025-01-28 | Stop reason: HOSPADM

## 2025-01-25 RX ORDER — ENOXAPARIN SODIUM 100 MG/ML
40 INJECTION SUBCUTANEOUS DAILY
Status: DISCONTINUED | OUTPATIENT
Start: 2025-01-25 | End: 2025-01-28 | Stop reason: HOSPADM

## 2025-01-25 RX ORDER — ACETAMINOPHEN 650 MG/1
650 SUPPOSITORY RECTAL EVERY 6 HOURS PRN
Status: DISCONTINUED | OUTPATIENT
Start: 2025-01-25 | End: 2025-01-28 | Stop reason: HOSPADM

## 2025-01-25 RX ORDER — GABAPENTIN 100 MG/1
100 CAPSULE ORAL 2 TIMES DAILY
Status: DISCONTINUED | OUTPATIENT
Start: 2025-01-25 | End: 2025-01-28 | Stop reason: HOSPADM

## 2025-01-25 RX ORDER — PRIMIDONE 50 MG/1
50 TABLET ORAL 2 TIMES DAILY
Status: DISCONTINUED | OUTPATIENT
Start: 2025-01-25 | End: 2025-01-28 | Stop reason: HOSPADM

## 2025-01-25 RX ORDER — POTASSIUM CHLORIDE 7.45 MG/ML
10 INJECTION INTRAVENOUS PRN
Status: DISCONTINUED | OUTPATIENT
Start: 2025-01-25 | End: 2025-01-28 | Stop reason: HOSPADM

## 2025-01-25 RX ORDER — ONDANSETRON 4 MG/1
4 TABLET, ORALLY DISINTEGRATING ORAL EVERY 8 HOURS PRN
Status: DISCONTINUED | OUTPATIENT
Start: 2025-01-25 | End: 2025-01-28 | Stop reason: HOSPADM

## 2025-01-25 RX ORDER — INSULIN GLARGINE 100 [IU]/ML
22 INJECTION, SOLUTION SUBCUTANEOUS NIGHTLY
Status: DISCONTINUED | OUTPATIENT
Start: 2025-01-25 | End: 2025-01-28 | Stop reason: HOSPADM

## 2025-01-25 RX ORDER — GABAPENTIN 300 MG/1
300 CAPSULE ORAL NIGHTLY
Status: DISCONTINUED | OUTPATIENT
Start: 2025-01-25 | End: 2025-01-28 | Stop reason: HOSPADM

## 2025-01-25 RX ORDER — SODIUM CHLORIDE 9 MG/ML
INJECTION, SOLUTION INTRAVENOUS CONTINUOUS
Status: DISCONTINUED | OUTPATIENT
Start: 2025-01-25 | End: 2025-01-26

## 2025-01-25 RX ORDER — POLYETHYLENE GLYCOL 3350 17 G/17G
17 POWDER, FOR SOLUTION ORAL DAILY PRN
Status: DISCONTINUED | OUTPATIENT
Start: 2025-01-25 | End: 2025-01-28 | Stop reason: HOSPADM

## 2025-01-25 RX ORDER — DEXTROSE MONOHYDRATE 100 MG/ML
INJECTION, SOLUTION INTRAVENOUS CONTINUOUS PRN
Status: DISCONTINUED | OUTPATIENT
Start: 2025-01-25 | End: 2025-01-26

## 2025-01-25 RX ORDER — PANTOPRAZOLE SODIUM 40 MG/1
40 TABLET, DELAYED RELEASE ORAL
Status: DISCONTINUED | OUTPATIENT
Start: 2025-01-26 | End: 2025-01-28 | Stop reason: HOSPADM

## 2025-01-25 RX ORDER — ONDANSETRON 2 MG/ML
4 INJECTION INTRAMUSCULAR; INTRAVENOUS EVERY 6 HOURS PRN
Status: DISCONTINUED | OUTPATIENT
Start: 2025-01-25 | End: 2025-01-28 | Stop reason: HOSPADM

## 2025-01-25 RX ORDER — POTASSIUM CHLORIDE 750 MG/1
40 TABLET, EXTENDED RELEASE ORAL PRN
Status: DISCONTINUED | OUTPATIENT
Start: 2025-01-25 | End: 2025-01-28 | Stop reason: HOSPADM

## 2025-01-25 RX ORDER — 0.9 % SODIUM CHLORIDE 0.9 %
1000 INTRAVENOUS SOLUTION INTRAVENOUS ONCE
Status: COMPLETED | OUTPATIENT
Start: 2025-01-25 | End: 2025-01-25

## 2025-01-25 RX ORDER — MONTELUKAST SODIUM 10 MG/1
10 TABLET ORAL NIGHTLY
Status: DISCONTINUED | OUTPATIENT
Start: 2025-01-25 | End: 2025-01-28 | Stop reason: HOSPADM

## 2025-01-25 RX ORDER — SODIUM CHLORIDE 0.9 % (FLUSH) 0.9 %
5-40 SYRINGE (ML) INJECTION PRN
Status: DISCONTINUED | OUTPATIENT
Start: 2025-01-25 | End: 2025-01-28 | Stop reason: HOSPADM

## 2025-01-25 RX ORDER — EZETIMIBE 10 MG/1
10 TABLET ORAL NIGHTLY
Status: DISCONTINUED | OUTPATIENT
Start: 2025-01-25 | End: 2025-01-28 | Stop reason: HOSPADM

## 2025-01-25 RX ORDER — ESCITALOPRAM OXALATE 10 MG/1
10 TABLET ORAL DAILY
Status: DISCONTINUED | OUTPATIENT
Start: 2025-01-26 | End: 2025-01-28 | Stop reason: HOSPADM

## 2025-01-25 RX ORDER — SODIUM CHLORIDE 9 MG/ML
INJECTION, SOLUTION INTRAVENOUS PRN
Status: DISCONTINUED | OUTPATIENT
Start: 2025-01-25 | End: 2025-01-28 | Stop reason: HOSPADM

## 2025-01-25 RX ORDER — ACETAMINOPHEN 325 MG/1
650 TABLET ORAL EVERY 6 HOURS PRN
Status: DISCONTINUED | OUTPATIENT
Start: 2025-01-25 | End: 2025-01-28 | Stop reason: HOSPADM

## 2025-01-25 RX ORDER — CARVEDILOL 6.25 MG/1
6.25 TABLET ORAL 2 TIMES DAILY WITH MEALS
Status: DISCONTINUED | OUTPATIENT
Start: 2025-01-25 | End: 2025-01-28 | Stop reason: HOSPADM

## 2025-01-25 RX ORDER — SODIUM CHLORIDE 0.9 % (FLUSH) 0.9 %
5-40 SYRINGE (ML) INJECTION EVERY 12 HOURS SCHEDULED
Status: DISCONTINUED | OUTPATIENT
Start: 2025-01-25 | End: 2025-01-28 | Stop reason: HOSPADM

## 2025-01-25 RX ADMIN — PRIMIDONE 50 MG: 50 TABLET ORAL at 21:52

## 2025-01-25 RX ADMIN — SODIUM CHLORIDE: 9 INJECTION, SOLUTION INTRAVENOUS at 16:26

## 2025-01-25 RX ADMIN — MONTELUKAST 10 MG: 10 TABLET, FILM COATED ORAL at 21:52

## 2025-01-25 RX ADMIN — CARVEDILOL 6.25 MG: 6.25 TABLET, FILM COATED ORAL at 17:22

## 2025-01-25 RX ADMIN — MEROPENEM 1000 MG: 1 INJECTION INTRAVENOUS at 14:36

## 2025-01-25 RX ADMIN — DICLOFENAC SODIUM 2 G: 10 GEL TOPICAL at 21:52

## 2025-01-25 RX ADMIN — SODIUM CHLORIDE 1000 ML: 9 INJECTION, SOLUTION INTRAVENOUS at 11:43

## 2025-01-25 RX ADMIN — MEROPENEM 1000 MG: 1 INJECTION INTRAVENOUS at 23:33

## 2025-01-25 RX ADMIN — EZETIMIBE 10 MG: 10 TABLET ORAL at 21:52

## 2025-01-25 RX ADMIN — GABAPENTIN 100 MG: 100 CAPSULE ORAL at 17:22

## 2025-01-25 RX ADMIN — ACETAMINOPHEN 650 MG: 325 TABLET ORAL at 22:02

## 2025-01-25 RX ADMIN — INSULIN GLARGINE 22 UNITS: 100 INJECTION, SOLUTION SUBCUTANEOUS at 21:51

## 2025-01-25 RX ADMIN — GABAPENTIN 300 MG: 300 CAPSULE ORAL at 21:51

## 2025-01-25 RX ADMIN — PREDNISONE 30 MG: 5 TABLET ORAL at 17:22

## 2025-01-25 RX ADMIN — SODIUM CHLORIDE, PRESERVATIVE FREE 10 ML: 5 INJECTION INTRAVENOUS at 21:53

## 2025-01-25 ASSESSMENT — PAIN DESCRIPTION - LOCATION
LOCATION: LEG

## 2025-01-25 ASSESSMENT — PAIN SCALES - GENERAL
PAINLEVEL_OUTOF10: 6
PAINLEVEL_OUTOF10: 4
PAINLEVEL_OUTOF10: 5

## 2025-01-25 ASSESSMENT — PAIN DESCRIPTION - DESCRIPTORS
DESCRIPTORS: DISCOMFORT
DESCRIPTORS: ACHING
DESCRIPTORS: ACHING

## 2025-01-25 ASSESSMENT — PAIN DESCRIPTION - ORIENTATION
ORIENTATION: RIGHT;LEFT

## 2025-01-25 NOTE — ED NOTES
Pt states to RN she is feeling much better and more clear headed at this time. Pt is alert and oriented times 4 at this time.

## 2025-01-25 NOTE — H&P
needs. 8/2/24 1/17/25  Misty Headley APRN - CNS       REVIEW OF SYSTEMS:  See HPI for details  General: Positive for generalized weakness  Eyes: negative for blurred vision, eye pain, loss of vision, diplopia  Ear Nose and Throat: negative for rhinorrhea, pharyngitis, otalgia, tinnitus, speech or swallowing difficulties  Respiratory:  negative for pleuritic pain, cough, sputum production, wheezing, SOB, JOSE  Cardiology:  negative for chest pain, palpitations, orthopnea, PND, edema, syncope   Gastrointestinal: Positive for nausea vomiting and diarrhea  Genitourinary: negative for frequency, urgency, dysuria, hematuria, incontinence  Muskuloskeletal : negative for arthralgia, myalgia  Hematology: negative for easy bruising, bleeding, lymphadenopathy  Dermatological: negative for rash, ulceration, mole change, new lesion  Endocrine: negative for hot flashes or polydipsia  Neurological: negative for headache, dizziness, confusion, focal weakness, paresthesia, memory loss, gait disturbance  Psychological: negative for anxiety, depression, agitation      Objective:   VITALS:    Vitals:    01/25/25 1300   BP: 123/62   Pulse: 72   Resp: 11   Temp:    SpO2: 92%     PHYSICAL EXAM:    Physical Exam:    Gen: Well-developed, well-nourished, in no acute distress  HEENT:  Pink conjunctivae, PERRL, hearing intact to voice, moist mucous membranes  Neck: Supple, without masses, thyroid non-tender  Resp: No accessory muscle use, clear breath sounds without wheezes rales or rhonchi  Card: No murmurs, normal S1, S2 without thrills, bruits or peripheral edema  Abd:  Soft, non-tender, non-distended, normoactive bowel sounds are present, no palpable organomegaly and no detectable hernias  Lymph:  No cervical or inguinal adenopathy  Musc: No cyanosis or clubbing  Skin: No rashes or ulcers, skin turgor is good  Neuro:  Cranial nerves are grossly intact, no focal motor weakness, follows commands appropriately  Psych:  Good insight, oriented

## 2025-01-25 NOTE — ED TRIAGE NOTES
Patient arrived via EMS from [facility] with cc n/v/d . Patient is quarantined for norovirus. Upon EMS arrival, patient was incontinent of stool x 3. Hypotensive SBP 70's. Given 400cc en route.     Patient reports she feels dizzy, generalized weakness, diaphoretic     Glucose 295    CHF, COPD. T2dm

## 2025-01-25 NOTE — ED PROVIDER NOTES
[facility] EMERGENCY DEPARTMENT  EMERGENCY DEPARTMENT ENCOUNTER      Pt Name: Siomara Collins  MRN: [id number]  Birthdate 1945  Date of evaluation: 1/25/2025  Provider: Angeles Thurston DO    CHIEF COMPLAINT       Chief Complaint   Patient presents with    Hypotension    Diarrhea         HISTORY OF PRESENT ILLNESS   (Location/Symptom, Timing/Onset, Context/Setting, Quality, Duration, Modifying Factors, Severity)  Note limiting factors.   HPI      Review of External Medical Records:     Nursing Notes were reviewed.    REVIEW OF SYSTEMS    (2-9 systems for level 4, 10 or more for level 5)     Review of Systems    Except as noted above the remainder of the review of systems was reviewed and negative.       PAST MEDICAL HISTORY     Past Medical History:   Diagnosis Date    Abnormal LFTs 08/24/2017    FATTY LIVER    Arthritis     OSTEO    Avascular necrosis of hip 08/24/2017    BILAT HIPS    Breast CA ([facility]) 1989, 2001    BILATERAL; SURGERY, CHEMO    Chronic pain     CKD (chronic kidney disease), stage II 8/24/2017    Coagulation disorder ([facility]) 1984    ITP  (DR DC CAR) - PLATELETS DROPPED TO 5K    Depression     Diabetes ([facility]) 2012    TYPE 2; NIDDM    DJD (degenerative joint disease), multiple sites 8/24/2017    GI bleed 2008    HEMORRHOIDS    Hyperlipemia     Hypertension with renal disease 8/24/2017    IBS (irritable bowel syndrome) 8/24/2017    Ill-defined condition 2015    PNEUMONIA X2 - HOSPITALIZED IN 2015    Interstitial lung disease ([facility]) 04/01/2019    Liver disease     FATTY LIVER    Myalgia 8/24/2017    On statin therapy 8/24/2017    Overactive bladder 8/24/2017    Pneumonia 04/2015    HOSPITALIZED 3 WEEKS.    Polymyalgia rheumatica ([facility]) 8/24/2017    Prophylactic antibiotic 8/24/2017    Reflex abnormality     acid reflex    Rosacea          SURGICAL HISTORY       Past Surgical History:   Procedure Laterality Date    APPENDECTOMY  1950    BREAST BIOPSY Bilateral     BREAST SURGERY

## 2025-01-25 NOTE — ED NOTES
TRANSFER - OUT REPORT:  Verbal report given to YUDELKA Steele on Siomara Collins  being transferred to ortho med surg for routine progression of patient care     Report consisted of patient's Situation, Background, Assessment and Recommendations(SBAR).   Information from the following report(s) Nurse Handoff Report, ED Encounter Summary, ED SBAR, MAR, Recent Results, Cardiac Rhythm vent paced NSR, and Neuro Assessment was reviewed with the receiving nurse.  Opportunity for questions and clarification was provided.      Patient transported with:  Tech

## 2025-01-26 LAB
ANION GAP SERPL CALC-SCNC: 7 MMOL/L (ref 2–12)
BASOPHILS # BLD: 0.02 K/UL (ref 0–0.1)
BASOPHILS NFR BLD: 0.3 % (ref 0–1)
BUN SERPL-MCNC: 23 MG/DL (ref 6–20)
BUN/CREAT SERPL: 37 (ref 12–20)
CALCIUM SERPL-MCNC: 8.1 MG/DL (ref 8.5–10.1)
CHLORIDE SERPL-SCNC: 106 MMOL/L (ref 97–108)
CO2 SERPL-SCNC: 29 MMOL/L (ref 21–32)
CREAT SERPL-MCNC: 0.63 MG/DL (ref 0.55–1.02)
DIFFERENTIAL METHOD BLD: ABNORMAL
EKG ATRIAL RATE: 75 BPM
EKG DIAGNOSIS: NORMAL
EKG P AXIS: 22 DEGREES
EKG P-R INTERVAL: 220 MS
EKG Q-T INTERVAL: 460 MS
EKG QRS DURATION: 130 MS
EKG QTC CALCULATION (BAZETT): 513 MS
EKG R AXIS: 75 DEGREES
EKG T AXIS: 235 DEGREES
EKG VENTRICULAR RATE: 75 BPM
EOSINOPHIL # BLD: 0.03 K/UL (ref 0–0.4)
EOSINOPHIL NFR BLD: 0.4 % (ref 0–7)
ERYTHROCYTE [DISTWIDTH] IN BLOOD BY AUTOMATED COUNT: 15 % (ref 11.5–14.5)
GLUCOSE BLD STRIP.AUTO-MCNC: 239 MG/DL (ref 65–117)
GLUCOSE BLD STRIP.AUTO-MCNC: 251 MG/DL (ref 65–117)
GLUCOSE BLD STRIP.AUTO-MCNC: 269 MG/DL (ref 65–117)
GLUCOSE BLD STRIP.AUTO-MCNC: 319 MG/DL (ref 65–117)
GLUCOSE SERPL-MCNC: 237 MG/DL (ref 65–100)
HCT VFR BLD AUTO: 38.1 % (ref 35–47)
HGB BLD-MCNC: 12.9 G/DL (ref 11.5–16)
IMM GRANULOCYTES # BLD AUTO: 0.05 K/UL (ref 0–0.04)
IMM GRANULOCYTES NFR BLD AUTO: 0.7 % (ref 0–0.5)
LYMPHOCYTES # BLD: 1.23 K/UL (ref 0.8–3.5)
LYMPHOCYTES NFR BLD: 17.9 % (ref 12–49)
MCH RBC QN AUTO: 32.5 PG (ref 26–34)
MCHC RBC AUTO-ENTMCNC: 33.9 G/DL (ref 30–36.5)
MCV RBC AUTO: 96 FL (ref 80–99)
MONOCYTES # BLD: 0.58 K/UL (ref 0–1)
MONOCYTES NFR BLD: 8.5 % (ref 5–13)
NEUTS SEG # BLD: 4.95 K/UL (ref 1.8–8)
NEUTS SEG NFR BLD: 72.2 % (ref 32–75)
NRBC # BLD: 0 K/UL (ref 0–0.01)
NRBC BLD-RTO: 0 PER 100 WBC
PLATELET # BLD AUTO: 172 K/UL (ref 150–400)
PMV BLD AUTO: 10.1 FL (ref 8.9–12.9)
POTASSIUM SERPL-SCNC: 3.3 MMOL/L (ref 3.5–5.1)
RBC # BLD AUTO: 3.97 M/UL (ref 3.8–5.2)
SERVICE CMNT-IMP: ABNORMAL
SODIUM SERPL-SCNC: 142 MMOL/L (ref 136–145)
WBC # BLD AUTO: 6.9 K/UL (ref 3.6–11)

## 2025-01-26 PROCEDURE — 2580000003 HC RX 258: Performed by: EMERGENCY MEDICINE

## 2025-01-26 PROCEDURE — 80048 BASIC METABOLIC PNL TOTAL CA: CPT

## 2025-01-26 PROCEDURE — 94761 N-INVAS EAR/PLS OXIMETRY MLT: CPT

## 2025-01-26 PROCEDURE — 6360000002 HC RX W HCPCS: Performed by: EMERGENCY MEDICINE

## 2025-01-26 PROCEDURE — 82962 GLUCOSE BLOOD TEST: CPT

## 2025-01-26 PROCEDURE — 1100000000 HC RM PRIVATE

## 2025-01-26 PROCEDURE — 6370000000 HC RX 637 (ALT 250 FOR IP): Performed by: HOSPITALIST

## 2025-01-26 PROCEDURE — 97530 THERAPEUTIC ACTIVITIES: CPT

## 2025-01-26 PROCEDURE — 97162 PT EVAL MOD COMPLEX 30 MIN: CPT

## 2025-01-26 PROCEDURE — 6370000000 HC RX 637 (ALT 250 FOR IP): Performed by: INTERNAL MEDICINE

## 2025-01-26 PROCEDURE — 85025 COMPLETE CBC W/AUTO DIFF WBC: CPT

## 2025-01-26 PROCEDURE — 2500000003 HC RX 250 WO HCPCS: Performed by: HOSPITALIST

## 2025-01-26 PROCEDURE — 36415 COLL VENOUS BLD VENIPUNCTURE: CPT

## 2025-01-26 RX ORDER — LACTOBACILLUS RHAMNOSUS GG 10B CELL
1 CAPSULE ORAL
Status: DISCONTINUED | OUTPATIENT
Start: 2025-01-26 | End: 2025-01-28 | Stop reason: HOSPADM

## 2025-01-26 RX ORDER — INSULIN LISPRO 100 [IU]/ML
0-8 INJECTION, SOLUTION INTRAVENOUS; SUBCUTANEOUS
Status: DISCONTINUED | OUTPATIENT
Start: 2025-01-26 | End: 2025-01-28 | Stop reason: HOSPADM

## 2025-01-26 RX ORDER — INSULIN LISPRO 100 [IU]/ML
12 INJECTION, SOLUTION INTRAVENOUS; SUBCUTANEOUS
Status: DISCONTINUED | OUTPATIENT
Start: 2025-01-26 | End: 2025-01-28 | Stop reason: HOSPADM

## 2025-01-26 RX ORDER — POTASSIUM CHLORIDE 750 MG/1
40 TABLET, EXTENDED RELEASE ORAL ONCE
Status: COMPLETED | OUTPATIENT
Start: 2025-01-26 | End: 2025-01-26

## 2025-01-26 RX ORDER — LISINOPRIL 5 MG/1
10 TABLET ORAL DAILY
Status: DISCONTINUED | OUTPATIENT
Start: 2025-01-26 | End: 2025-01-28 | Stop reason: HOSPADM

## 2025-01-26 RX ORDER — INSULIN LISPRO 100 [IU]/ML
0-4 INJECTION, SOLUTION INTRAVENOUS; SUBCUTANEOUS
Status: DISCONTINUED | OUTPATIENT
Start: 2025-01-26 | End: 2025-01-26

## 2025-01-26 RX ORDER — DEXTROSE MONOHYDRATE 100 MG/ML
INJECTION, SOLUTION INTRAVENOUS CONTINUOUS PRN
Status: DISCONTINUED | OUTPATIENT
Start: 2025-01-26 | End: 2025-01-28 | Stop reason: HOSPADM

## 2025-01-26 RX ORDER — DEXTROSE MONOHYDRATE 100 MG/ML
INJECTION, SOLUTION INTRAVENOUS CONTINUOUS PRN
Status: DISCONTINUED | OUTPATIENT
Start: 2025-01-26 | End: 2025-01-26

## 2025-01-26 RX ADMIN — POTASSIUM CHLORIDE 40 MEQ: 750 TABLET, EXTENDED RELEASE ORAL at 12:24

## 2025-01-26 RX ADMIN — DICLOFENAC SODIUM 2 G: 10 GEL TOPICAL at 22:08

## 2025-01-26 RX ADMIN — GABAPENTIN 100 MG: 100 CAPSULE ORAL at 09:57

## 2025-01-26 RX ADMIN — INSULIN LISPRO 2 UNITS: 100 INJECTION, SOLUTION INTRAVENOUS; SUBCUTANEOUS at 09:57

## 2025-01-26 RX ADMIN — SODIUM CHLORIDE, PRESERVATIVE FREE 10 ML: 5 INJECTION INTRAVENOUS at 09:58

## 2025-01-26 RX ADMIN — DICLOFENAC SODIUM 2 G: 10 GEL TOPICAL at 17:27

## 2025-01-26 RX ADMIN — PANTOPRAZOLE SODIUM 40 MG: 40 TABLET, DELAYED RELEASE ORAL at 06:46

## 2025-01-26 RX ADMIN — ACETAMINOPHEN 650 MG: 325 TABLET ORAL at 10:07

## 2025-01-26 RX ADMIN — EZETIMIBE 10 MG: 10 TABLET ORAL at 22:08

## 2025-01-26 RX ADMIN — Medication 1 CAPSULE: at 09:57

## 2025-01-26 RX ADMIN — ACETAMINOPHEN 650 MG: 325 TABLET ORAL at 22:42

## 2025-01-26 RX ADMIN — INSULIN LISPRO 2 UNITS: 100 INJECTION, SOLUTION INTRAVENOUS; SUBCUTANEOUS at 17:28

## 2025-01-26 RX ADMIN — DICLOFENAC SODIUM 2 G: 10 GEL TOPICAL at 09:56

## 2025-01-26 RX ADMIN — MEROPENEM 1000 MG: 1 INJECTION INTRAVENOUS at 22:17

## 2025-01-26 RX ADMIN — INSULIN LISPRO 12 UNITS: 100 INJECTION, SOLUTION INTRAVENOUS; SUBCUTANEOUS at 12:25

## 2025-01-26 RX ADMIN — PREDNISONE 30 MG: 5 TABLET ORAL at 09:57

## 2025-01-26 RX ADMIN — MEROPENEM 1000 MG: 1 INJECTION INTRAVENOUS at 06:47

## 2025-01-26 RX ADMIN — CARVEDILOL 6.25 MG: 6.25 TABLET, FILM COATED ORAL at 17:27

## 2025-01-26 RX ADMIN — INSULIN LISPRO 6 UNITS: 100 INJECTION, SOLUTION INTRAVENOUS; SUBCUTANEOUS at 12:25

## 2025-01-26 RX ADMIN — ACETAMINOPHEN 650 MG: 325 TABLET ORAL at 16:27

## 2025-01-26 RX ADMIN — INSULIN GLARGINE 22 UNITS: 100 INJECTION, SOLUTION SUBCUTANEOUS at 22:42

## 2025-01-26 RX ADMIN — LISINOPRIL 10 MG: 5 TABLET ORAL at 10:01

## 2025-01-26 RX ADMIN — MEROPENEM 1000 MG: 1 INJECTION INTRAVENOUS at 16:25

## 2025-01-26 RX ADMIN — GABAPENTIN 100 MG: 100 CAPSULE ORAL at 17:27

## 2025-01-26 RX ADMIN — PRIMIDONE 50 MG: 50 TABLET ORAL at 22:08

## 2025-01-26 RX ADMIN — CARVEDILOL 6.25 MG: 6.25 TABLET, FILM COATED ORAL at 09:57

## 2025-01-26 RX ADMIN — INSULIN LISPRO 4 UNITS: 100 INJECTION, SOLUTION INTRAVENOUS; SUBCUTANEOUS at 22:41

## 2025-01-26 RX ADMIN — ESCITALOPRAM OXALATE 10 MG: 10 TABLET ORAL at 09:57

## 2025-01-26 RX ADMIN — INSULIN LISPRO 12 UNITS: 100 INJECTION, SOLUTION INTRAVENOUS; SUBCUTANEOUS at 17:27

## 2025-01-26 RX ADMIN — PRIMIDONE 50 MG: 50 TABLET ORAL at 09:57

## 2025-01-26 RX ADMIN — GABAPENTIN 300 MG: 300 CAPSULE ORAL at 22:08

## 2025-01-26 RX ADMIN — DICLOFENAC SODIUM 2 G: 10 GEL TOPICAL at 12:25

## 2025-01-26 RX ADMIN — MONTELUKAST 10 MG: 10 TABLET, FILM COATED ORAL at 22:07

## 2025-01-26 ASSESSMENT — PAIN DESCRIPTION - LOCATION
LOCATION: LEG

## 2025-01-26 ASSESSMENT — PAIN DESCRIPTION - ORIENTATION
ORIENTATION: RIGHT;LEFT
ORIENTATION: LEFT;RIGHT
ORIENTATION: RIGHT;LEFT
ORIENTATION: LEFT;RIGHT

## 2025-01-26 ASSESSMENT — PAIN DESCRIPTION - DESCRIPTORS
DESCRIPTORS: DISCOMFORT
DESCRIPTORS: DISCOMFORT;CRAMPING;THROBBING
DESCRIPTORS: ACHING

## 2025-01-26 ASSESSMENT — PAIN SCALES - GENERAL
PAINLEVEL_OUTOF10: 6
PAINLEVEL_OUTOF10: 4
PAINLEVEL_OUTOF10: 6
PAINLEVEL_OUTOF10: 4
PAINLEVEL_OUTOF10: 5

## 2025-01-27 LAB
ANION GAP SERPL CALC-SCNC: 4 MMOL/L (ref 2–12)
BASOPHILS # BLD: 0.02 K/UL (ref 0–0.1)
BASOPHILS NFR BLD: 0.3 % (ref 0–1)
BUN SERPL-MCNC: 23 MG/DL (ref 6–20)
BUN/CREAT SERPL: 35 (ref 12–20)
CALCIUM SERPL-MCNC: 8.5 MG/DL (ref 8.5–10.1)
CHLORIDE SERPL-SCNC: 109 MMOL/L (ref 97–108)
CO2 SERPL-SCNC: 28 MMOL/L (ref 21–32)
CREAT SERPL-MCNC: 0.66 MG/DL (ref 0.55–1.02)
DIFFERENTIAL METHOD BLD: ABNORMAL
EOSINOPHIL # BLD: 0.03 K/UL (ref 0–0.4)
EOSINOPHIL NFR BLD: 0.5 % (ref 0–7)
ERYTHROCYTE [DISTWIDTH] IN BLOOD BY AUTOMATED COUNT: 15 % (ref 11.5–14.5)
GLUCOSE BLD STRIP.AUTO-MCNC: 179 MG/DL (ref 65–117)
GLUCOSE BLD STRIP.AUTO-MCNC: 214 MG/DL (ref 65–117)
GLUCOSE BLD STRIP.AUTO-MCNC: 231 MG/DL (ref 65–117)
GLUCOSE BLD STRIP.AUTO-MCNC: 241 MG/DL (ref 65–117)
GLUCOSE SERPL-MCNC: 244 MG/DL (ref 65–100)
HCT VFR BLD AUTO: 37.4 % (ref 35–47)
HGB BLD-MCNC: 12.3 G/DL (ref 11.5–16)
IMM GRANULOCYTES # BLD AUTO: 0.07 K/UL (ref 0–0.04)
IMM GRANULOCYTES NFR BLD AUTO: 1.1 % (ref 0–0.5)
LYMPHOCYTES # BLD: 1.34 K/UL (ref 0.8–3.5)
LYMPHOCYTES NFR BLD: 20.4 % (ref 12–49)
MAGNESIUM SERPL-MCNC: 1.9 MG/DL (ref 1.6–2.4)
MCH RBC QN AUTO: 32.5 PG (ref 26–34)
MCHC RBC AUTO-ENTMCNC: 32.9 G/DL (ref 30–36.5)
MCV RBC AUTO: 98.7 FL (ref 80–99)
MONOCYTES # BLD: 0.49 K/UL (ref 0–1)
MONOCYTES NFR BLD: 7.5 % (ref 5–13)
NEUTS SEG # BLD: 4.61 K/UL (ref 1.8–8)
NEUTS SEG NFR BLD: 70.2 % (ref 32–75)
NRBC # BLD: 0 K/UL (ref 0–0.01)
NRBC BLD-RTO: 0 PER 100 WBC
PLATELET # BLD AUTO: 162 K/UL (ref 150–400)
PMV BLD AUTO: 9.9 FL (ref 8.9–12.9)
POTASSIUM SERPL-SCNC: 3.8 MMOL/L (ref 3.5–5.1)
RBC # BLD AUTO: 3.79 M/UL (ref 3.8–5.2)
SERVICE CMNT-IMP: ABNORMAL
SODIUM SERPL-SCNC: 141 MMOL/L (ref 136–145)
WBC # BLD AUTO: 6.6 K/UL (ref 3.6–11)

## 2025-01-27 PROCEDURE — 6370000000 HC RX 637 (ALT 250 FOR IP): Performed by: HOSPITALIST

## 2025-01-27 PROCEDURE — 97165 OT EVAL LOW COMPLEX 30 MIN: CPT

## 2025-01-27 PROCEDURE — 82962 GLUCOSE BLOOD TEST: CPT

## 2025-01-27 PROCEDURE — 97535 SELF CARE MNGMENT TRAINING: CPT

## 2025-01-27 PROCEDURE — 6360000002 HC RX W HCPCS: Performed by: HOSPITALIST

## 2025-01-27 PROCEDURE — 83735 ASSAY OF MAGNESIUM: CPT

## 2025-01-27 PROCEDURE — 6370000000 HC RX 637 (ALT 250 FOR IP): Performed by: INTERNAL MEDICINE

## 2025-01-27 PROCEDURE — 6360000002 HC RX W HCPCS: Performed by: EMERGENCY MEDICINE

## 2025-01-27 PROCEDURE — 85025 COMPLETE CBC W/AUTO DIFF WBC: CPT

## 2025-01-27 PROCEDURE — 2580000003 HC RX 258: Performed by: EMERGENCY MEDICINE

## 2025-01-27 PROCEDURE — 80048 BASIC METABOLIC PNL TOTAL CA: CPT

## 2025-01-27 PROCEDURE — 1100000000 HC RM PRIVATE

## 2025-01-27 PROCEDURE — 97530 THERAPEUTIC ACTIVITIES: CPT

## 2025-01-27 PROCEDURE — 2500000003 HC RX 250 WO HCPCS: Performed by: HOSPITALIST

## 2025-01-27 RX ADMIN — MEROPENEM 1000 MG: 1 INJECTION INTRAVENOUS at 14:37

## 2025-01-27 RX ADMIN — EZETIMIBE 10 MG: 10 TABLET ORAL at 20:47

## 2025-01-27 RX ADMIN — SODIUM CHLORIDE, PRESERVATIVE FREE 10 ML: 5 INJECTION INTRAVENOUS at 20:49

## 2025-01-27 RX ADMIN — INSULIN LISPRO 2 UNITS: 100 INJECTION, SOLUTION INTRAVENOUS; SUBCUTANEOUS at 17:14

## 2025-01-27 RX ADMIN — INSULIN LISPRO 12 UNITS: 100 INJECTION, SOLUTION INTRAVENOUS; SUBCUTANEOUS at 12:30

## 2025-01-27 RX ADMIN — PRIMIDONE 50 MG: 50 TABLET ORAL at 08:25

## 2025-01-27 RX ADMIN — INSULIN LISPRO 12 UNITS: 100 INJECTION, SOLUTION INTRAVENOUS; SUBCUTANEOUS at 17:14

## 2025-01-27 RX ADMIN — INSULIN LISPRO 2 UNITS: 100 INJECTION, SOLUTION INTRAVENOUS; SUBCUTANEOUS at 20:58

## 2025-01-27 RX ADMIN — DICLOFENAC SODIUM 2 G: 10 GEL TOPICAL at 12:30

## 2025-01-27 RX ADMIN — PRIMIDONE 50 MG: 50 TABLET ORAL at 20:59

## 2025-01-27 RX ADMIN — INSULIN LISPRO 12 UNITS: 100 INJECTION, SOLUTION INTRAVENOUS; SUBCUTANEOUS at 08:26

## 2025-01-27 RX ADMIN — ACETAMINOPHEN 650 MG: 325 TABLET ORAL at 12:34

## 2025-01-27 RX ADMIN — MEROPENEM 1000 MG: 1 INJECTION INTRAVENOUS at 23:07

## 2025-01-27 RX ADMIN — Medication 1 CAPSULE: at 08:25

## 2025-01-27 RX ADMIN — DICLOFENAC SODIUM 2 G: 10 GEL TOPICAL at 17:15

## 2025-01-27 RX ADMIN — MONTELUKAST 10 MG: 10 TABLET, FILM COATED ORAL at 20:47

## 2025-01-27 RX ADMIN — PANTOPRAZOLE SODIUM 40 MG: 40 TABLET, DELAYED RELEASE ORAL at 05:59

## 2025-01-27 RX ADMIN — MEROPENEM 1000 MG: 1 INJECTION INTRAVENOUS at 06:00

## 2025-01-27 RX ADMIN — LISINOPRIL 10 MG: 5 TABLET ORAL at 08:25

## 2025-01-27 RX ADMIN — GABAPENTIN 100 MG: 100 CAPSULE ORAL at 17:15

## 2025-01-27 RX ADMIN — DICLOFENAC SODIUM 2 G: 10 GEL TOPICAL at 08:27

## 2025-01-27 RX ADMIN — INSULIN LISPRO 2 UNITS: 100 INJECTION, SOLUTION INTRAVENOUS; SUBCUTANEOUS at 08:26

## 2025-01-27 RX ADMIN — INSULIN GLARGINE 22 UNITS: 100 INJECTION, SOLUTION SUBCUTANEOUS at 20:58

## 2025-01-27 RX ADMIN — CARVEDILOL 6.25 MG: 6.25 TABLET, FILM COATED ORAL at 17:15

## 2025-01-27 RX ADMIN — GABAPENTIN 300 MG: 300 CAPSULE ORAL at 20:48

## 2025-01-27 RX ADMIN — HUMAN INSULIN 24 UNITS: 100 INJECTION, SUSPENSION SUBCUTANEOUS at 08:26

## 2025-01-27 RX ADMIN — ACETAMINOPHEN 650 MG: 325 TABLET ORAL at 05:59

## 2025-01-27 RX ADMIN — PREDNISONE 30 MG: 5 TABLET ORAL at 08:25

## 2025-01-27 RX ADMIN — DICLOFENAC SODIUM 2 G: 10 GEL TOPICAL at 20:48

## 2025-01-27 RX ADMIN — ESCITALOPRAM OXALATE 10 MG: 10 TABLET ORAL at 08:25

## 2025-01-27 RX ADMIN — SODIUM CHLORIDE, PRESERVATIVE FREE 10 ML: 5 INJECTION INTRAVENOUS at 08:27

## 2025-01-27 RX ADMIN — GABAPENTIN 100 MG: 100 CAPSULE ORAL at 08:25

## 2025-01-27 RX ADMIN — CARVEDILOL 6.25 MG: 6.25 TABLET, FILM COATED ORAL at 08:25

## 2025-01-27 ASSESSMENT — PAIN SCALES - GENERAL
PAINLEVEL_OUTOF10: 4
PAINLEVEL_OUTOF10: 5
PAINLEVEL_OUTOF10: 5

## 2025-01-27 ASSESSMENT — PAIN DESCRIPTION - LOCATION: LOCATION: KNEE

## 2025-01-27 ASSESSMENT — PAIN DESCRIPTION - DESCRIPTORS: DESCRIPTORS: ACHING

## 2025-01-27 ASSESSMENT — PAIN DESCRIPTION - ORIENTATION: ORIENTATION: RIGHT;LEFT

## 2025-01-27 NOTE — PLAN OF CARE
Problem: Chronic Conditions and Co-morbidities  Goal: Patient's chronic conditions and co-morbidity symptoms are monitored and maintained or improved  1/25/2025 1754 by Vaishali Man RN  Outcome: Progressing  1/25/2025 1754 by Vaishali Man RN  Outcome: Progressing     Problem: Discharge Planning  Goal: Discharge to home or other facility with appropriate resources  1/25/2025 1754 by Vaishali Man RN  Outcome: Progressing  1/25/2025 1754 by Vaishali Man RN  Outcome: Progressing     Problem: Pain  Goal: Verbalizes/displays adequate comfort level or baseline comfort level  1/25/2025 1754 by Vaishali Man RN  Outcome: Progressing  1/25/2025 1754 by Vaishali Man RN  Outcome: Progressing     Problem: Skin/Tissue Integrity  Goal: Skin integrity remains intact  Description: 1.  Monitor for areas of redness and/or skin breakdown  2.  Assess vascular access sites hourly  3.  Every 4-6 hours minimum:  Change oxygen saturation probe site  4.  Every 4-6 hours:  If on nasal continuous positive airway pressure, respiratory therapy assess nares and determine need for appliance change or resting period  1/25/2025 1754 by Vaishali Man RN  Outcome: Progressing  1/25/2025 1754 by Vaishali Man RN  Outcome: Progressing     
  Problem: Chronic Conditions and Co-morbidities  Goal: Patient's chronic conditions and co-morbidity symptoms are monitored and maintained or improved  1/25/2025 2346 by Amberly Mariscal LPN  Outcome: Progressing  1/25/2025 1754 by Vaishali Man RN  Outcome: Progressing  1/25/2025 1754 by Vaishali Man RN  Outcome: Progressing     Problem: Discharge Planning  Goal: Discharge to home or other facility with appropriate resources  1/25/2025 2346 by Amberly Mariscal LPN  Outcome: Progressing  1/25/2025 1754 by Vaishali Man RN  Outcome: Progressing  1/25/2025 1754 by Vaishali Man RN  Outcome: Progressing     Problem: Pain  Goal: Verbalizes/displays adequate comfort level or baseline comfort level  1/25/2025 2346 by Amberly Mariscal LPN  Outcome: Progressing  1/25/2025 1754 by Vaishali Man RN  Outcome: Progressing  1/25/2025 1754 by Vaishali Man RN  Outcome: Progressing     Problem: Skin/Tissue Integrity  Goal: Skin integrity remains intact  Description: 1.  Monitor for areas of redness and/or skin breakdown  2.  Assess vascular access sites hourly  3.  Every 4-6 hours minimum:  Change oxygen saturation probe site  4.  Every 4-6 hours:  If on nasal continuous positive airway pressure, respiratory therapy assess nares and determine need for appliance change or resting period  1/25/2025 2346 by Amberly Mariscal LPN  Outcome: Progressing  1/25/2025 1754 by Vaishali Man RN  Outcome: Progressing  1/25/2025 1754 by Vaishali Man RN  Outcome: Progressing     Problem: Safety - Adult  Goal: Free from fall injury  Outcome: Progressing     
  Problem: Chronic Conditions and Co-morbidities  Goal: Patient's chronic conditions and co-morbidity symptoms are monitored and maintained or improved  1/26/2025 0940 by Vaishali Man RN  Outcome: Progressing  1/25/2025 2346 by Amberly Mariscal LPN  Outcome: Progressing     Problem: Discharge Planning  Goal: Discharge to home or other facility with appropriate resources  1/26/2025 0940 by Vaishali Man RN  Outcome: Progressing  1/25/2025 2346 by Amberly Mariscal LPN  Outcome: Progressing     Problem: Pain  Goal: Verbalizes/displays adequate comfort level or baseline comfort level  1/26/2025 0940 by Vaishali Man RN  Outcome: Progressing  1/25/2025 2346 by Amberly Mariscal LPN  Outcome: Progressing     Problem: Skin/Tissue Integrity  Goal: Skin integrity remains intact  Description: 1.  Monitor for areas of redness and/or skin breakdown  2.  Assess vascular access sites hourly  3.  Every 4-6 hours minimum:  Change oxygen saturation probe site  4.  Every 4-6 hours:  If on nasal continuous positive airway pressure, respiratory therapy assess nares and determine need for appliance change or resting period  1/26/2025 0940 by Vaishali Man RN  Outcome: Progressing  1/25/2025 2346 by Amberly Mariscal LPN  Outcome: Progressing     Problem: Safety - Adult  Goal: Free from fall injury  1/26/2025 0940 by Vaishali Man RN  Outcome: Progressing  1/25/2025 2346 by Amberly Mariscal LPN  Outcome: Progressing     
  Problem: Chronic Conditions and Co-morbidities  Goal: Patient's chronic conditions and co-morbidity symptoms are monitored and maintained or improved  Outcome: Progressing     Problem: Discharge Planning  Goal: Discharge to home or other facility with appropriate resources  Outcome: Progressing     Problem: Pain  Goal: Verbalizes/displays adequate comfort level or baseline comfort level  Outcome: Progressing     Problem: Skin/Tissue Integrity  Goal: Skin integrity remains intact  Description: 1.  Monitor for areas of redness and/or skin breakdown  2.  Assess vascular access sites hourly  3.  Every 4-6 hours minimum:  Change oxygen saturation probe site  4.  Every 4-6 hours:  If on nasal continuous positive airway pressure, respiratory therapy assess nares and determine need for appliance change or resting period  Outcome: Progressing     
  Problem: Chronic Conditions and Co-morbidities  Goal: Patient's chronic conditions and co-morbidity symptoms are monitored and maintained or improved  Outcome: Progressing     Problem: Discharge Planning  Goal: Discharge to home or other facility with appropriate resources  Outcome: Progressing     Problem: Pain  Goal: Verbalizes/displays adequate comfort level or baseline comfort level  Outcome: Progressing     Problem: Skin/Tissue Integrity  Goal: Skin integrity remains intact  Description: 1.  Monitor for areas of redness and/or skin breakdown  2.  Assess vascular access sites hourly  3.  Every 4-6 hours minimum:  Change oxygen saturation probe site  4.  Every 4-6 hours:  If on nasal continuous positive airway pressure, respiratory therapy assess nares and determine need for appliance change or resting period  Outcome: Progressing     Problem: Safety - Adult  Goal: Free from fall injury  Outcome: Progressing     
  Problem: Occupational Therapy - Adult  Goal: By Discharge: Performs self-care activities at highest level of function for planned discharge setting.  See evaluation for individualized goals.  Description: FUNCTIONAL STATUS PRIOR TO ADMISSION/ HOME SUPPORT: Patient has had several hospital/ rehab stays this past year.  Most recently, she was at [facility] and returned home to [facility] on 1/13/25.  She reports her best level of mobility at [facility] was stand-pivot transfers with assist.  At [facility], she reports she could dress herself and received assistance with bathing and toileting + x1-2 assist with transfers (not using RW, just with helpers standing directly anterior to her).       Occupational Therapy Goals:  Initiated 1/27/2025  1.  Patient will perform bathing with Minimal Assist within 7 day(s).  2.  Patient will perform lower body dressing with Minimal Assist within 7 day(s).  3.  Patient will perform upper body dressing with Set-up within 7 day(s).  4.  Patient will perform toilet transfers with Minimal Assist  within 7 day(s).  5.  Patient will perform all aspects of toileting with Minimal Assist within 7 day(s).  6.  Patient will participate in upper extremity therapeutic exercise/activities with [place] 10 minutes within 7 day(s).    7.  Patient will utilize fall prevention techniques during functional activities without cuing within 7 day(s).    Outcome: Progressing     OCCUPATIONAL THERAPY EVALUATION    Patient: Siomara Collins (80 y.o. female)  Date: 1/27/2025  Primary Diagnosis: Dehydration [E86.0]  Acute cystitis with hematuria [N30.01]  Acute cystitis without hematuria [N30.00]  Diarrhea, unspecified type [R19.7]         Precautions: Fall Risk                  ASSESSMENT :  Patient has had several hospital/ rehab stays this past year.  Most recently, she was at [facility] and returned home to [facility] on 1/13/25.  She reports her best level of mobility at [facility] was stand-pivot 
  Problem: Physical Therapy - Adult  Goal: By Discharge: Performs mobility at highest level of function for planned discharge setting.  See evaluation for individualized goals.  Description: FUNCTIONAL STATUS PRIOR TO ADMISSION: The patient was functional at the wheelchair level and required assistx1-2 for stand pivot transfers to the chair and restroom.     HOME SUPPORT PRIOR TO ADMISSION: The patient lived at the [facility] and has had recent rehab stays at [facility] earlier this month.     Physical Therapy Goals  Initiated 1/26/2025  1.  Patient will move from supine to sit and sit to supine, scoot up and down, and roll side to side in bed with contact guard assist within 7 day(s).    2.  Patient will perform sit to stand with minimal assistance within 7 day(s).  3.  Patient will transfer from bed to chair and chair to bed with minimal assistance using the least restrictive device within 7 day(s).  4.  Patient will ambulate with minimal assistance for 2 feet with the least restrictive device within 7 day(s).   5.  Patient will propel w/c for 50ft with supervision within 7 days.    Outcome: Progressing     PHYSICAL THERAPY TREATMENT    Patient: Siomara Collins (80 y.o. female)  Date: 1/27/2025  Diagnosis: Dehydration [E86.0]  Acute cystitis with hematuria [N30.01]  Acute cystitis without hematuria [N30.00]  Diarrhea, unspecified type [R19.7] Acute cystitis with hematuria      Precautions: Fall Risk                      ASSESSMENT:  Patient continues to benefit from skilled PT services and is slowly progressing towards goals. With use of hospital bed features (maximally elevated head of bed and bed rail), patient was able to transition herself to edge of bed with increased time and effort. She continues with ongoing bilateral lower extremity weakness and impaired balance with decreased standing tolerance which places her at risk for falls. However, she reports this is her maximal baseline functional 
LIBERTY, Natacha MARINELLI, Donny MARINELLI, Joy MARINELLI. Association of AM-PAC \"6-Clicks\" Basic Mobility and Daily Activity Scores With Discharge Destination. Phys Ther. 2021 Apr 4;101(4):xfal524. doi: 10.1093/ptj/hnzv665. PMID: 54314694.  3. Xochilt MARINELLI, Fiordaliza D, Pura S, Robyn K, Jomar S. Activity Measure for Post-Acute Care \"6-Clicks\" Basic Mobility Scores Predict Discharge Destination After Acute Care Hospitalization in Select Patient Groups: A Retrospective, Observational Study. Arch Rehabil Res Clin Transl. 2022 Jul 16;4(3):234795. doi: 10.1016/j.arrct.2022.872159. PMID: 64326064; PMCID: [id number].  4. Christina ANGEL, Magaly S, Radha W, Alexandra POWELL. AM-PAC Short Forms Manual 4.0. Revised 2/2020.                                                                                                                                                                                                                              Pain Rating:  Patient without reports of pain during therapy    Pain Intervention(s):       Activity Tolerance:   Fair  and requires frequent rest breaks    After treatment:   Patient left in no apparent distress sitting up in chair, Call bell within reach, and Bed/ chair alarm activated    COMMUNICATION/EDUCATION:   The patient's plan of care was discussed with: registered nurse, physician, and rehabilitation technician    Patient Education  Education Given To: Patient  Education Provided: Role of Therapy;Transfer Training;Plan of Care;Mobility Training;Equipment  Education Method: Verbal  Barriers to Learning: None  Education Outcome: Verbalized understanding    Thank you for this referral.  Sp Yu PT, DPT  Minutes: 39

## 2025-01-28 VITALS
OXYGEN SATURATION: 93 % | RESPIRATION RATE: 16 BRPM | TEMPERATURE: 97.7 F | HEART RATE: 83 BPM | SYSTOLIC BLOOD PRESSURE: 134 MMHG | DIASTOLIC BLOOD PRESSURE: 83 MMHG | HEIGHT: 63 IN | WEIGHT: 202.5 LBS | BODY MASS INDEX: 35.88 KG/M2

## 2025-01-28 LAB
BACTERIA SPEC CULT: ABNORMAL
BACTERIA SPEC CULT: ABNORMAL
CC UR VC: ABNORMAL
GLUCOSE BLD STRIP.AUTO-MCNC: 167 MG/DL (ref 65–117)
GLUCOSE BLD STRIP.AUTO-MCNC: 168 MG/DL (ref 65–117)
GLUCOSE BLD STRIP.AUTO-MCNC: 168 MG/DL (ref 65–117)
GLUCOSE BLD STRIP.AUTO-MCNC: 196 MG/DL (ref 65–117)
SERVICE CMNT-IMP: ABNORMAL

## 2025-01-28 PROCEDURE — 6370000000 HC RX 637 (ALT 250 FOR IP): Performed by: HOSPITALIST

## 2025-01-28 PROCEDURE — 6360000002 HC RX W HCPCS: Performed by: EMERGENCY MEDICINE

## 2025-01-28 PROCEDURE — 94761 N-INVAS EAR/PLS OXIMETRY MLT: CPT

## 2025-01-28 PROCEDURE — 82962 GLUCOSE BLOOD TEST: CPT

## 2025-01-28 PROCEDURE — 2580000003 HC RX 258: Performed by: EMERGENCY MEDICINE

## 2025-01-28 PROCEDURE — 6370000000 HC RX 637 (ALT 250 FOR IP): Performed by: INTERNAL MEDICINE

## 2025-01-28 RX ORDER — GABAPENTIN 100 MG/1
CAPSULE ORAL
Qty: 60 CAPSULE | Refills: 0 | Status: SHIPPED | OUTPATIENT
Start: 2025-01-28 | End: 2025-02-27

## 2025-01-28 RX ORDER — CEFUROXIME AXETIL 500 MG/1
500 TABLET ORAL 2 TIMES DAILY
Qty: 6 TABLET | Refills: 0 | Status: SHIPPED | OUTPATIENT
Start: 2025-01-28 | End: 2025-01-31

## 2025-01-28 RX ADMIN — PANTOPRAZOLE SODIUM 40 MG: 40 TABLET, DELAYED RELEASE ORAL at 06:42

## 2025-01-28 RX ADMIN — INSULIN LISPRO 12 UNITS: 100 INJECTION, SOLUTION INTRAVENOUS; SUBCUTANEOUS at 11:53

## 2025-01-28 RX ADMIN — Medication 1 CAPSULE: at 08:52

## 2025-01-28 RX ADMIN — INSULIN LISPRO 12 UNITS: 100 INJECTION, SOLUTION INTRAVENOUS; SUBCUTANEOUS at 08:52

## 2025-01-28 RX ADMIN — DICLOFENAC SODIUM 2 G: 10 GEL TOPICAL at 08:53

## 2025-01-28 RX ADMIN — INSULIN LISPRO 2 UNITS: 100 INJECTION, SOLUTION INTRAVENOUS; SUBCUTANEOUS at 11:53

## 2025-01-28 RX ADMIN — CARVEDILOL 6.25 MG: 6.25 TABLET, FILM COATED ORAL at 08:52

## 2025-01-28 RX ADMIN — ACETAMINOPHEN 650 MG: 325 TABLET ORAL at 09:22

## 2025-01-28 RX ADMIN — MEROPENEM 1000 MG: 1 INJECTION INTRAVENOUS at 06:43

## 2025-01-28 RX ADMIN — ESCITALOPRAM OXALATE 10 MG: 10 TABLET ORAL at 08:52

## 2025-01-28 RX ADMIN — LISINOPRIL 10 MG: 5 TABLET ORAL at 08:52

## 2025-01-28 RX ADMIN — PREDNISONE 30 MG: 5 TABLET ORAL at 08:52

## 2025-01-28 RX ADMIN — PRIMIDONE 50 MG: 50 TABLET ORAL at 09:22

## 2025-01-28 RX ADMIN — GABAPENTIN 100 MG: 100 CAPSULE ORAL at 08:52

## 2025-01-28 RX ADMIN — HUMAN INSULIN 24 UNITS: 100 INJECTION, SUSPENSION SUBCUTANEOUS at 08:52

## 2025-01-28 NOTE — PROGRESS NOTES
Hospital follow up with PCP set for Friday January 31st 2025 at 2:10 PM with Doctor Vang at their [place] office.   
JORDYN RALPH [facility]  [address] [place] [address] [phone]        Hospitalist Progress Note      NAME: Siomara Collins   :  1945  MRM:  [id number]    Date/Time: 2025  11:41 AM         Subjective:     Chief Complaint: \"I feel much better\"     Pt seen and examined. Denies dizziness, confusion. Has NOT had any GI symptoms (N/V/D, ab pain)    ROS:  (bold if positive, if negative)            Objective:       Vitals:          Last 24hrs VS reviewed since prior progress note. Most recent are:    Vitals:    25 0741   BP: (!) 151/89   Pulse: 85   Resp: 22   Temp: 98 °F (36.7 °C)   SpO2: 97%     SpO2 Readings from Last 6 Encounters:   25 97%   25 95%   25 96%   25 97%   24 99%   24 95%          Intake/Output Summary (Last 24 hours) at 2025 1141  Last data filed at 2025 2153  Gross per 24 hour   Intake 10 ml   Output --   Net 10 ml          Exam:     Physical Exam:    Gen:  Well-developed, well-nourished, in no acute distress  HEENT:  Pink conjunctivae, PERRL, hearing intact to voice, moist mucous membranes  Neck:  Supple, without masses, thyroid non-tender  Resp:  No accessory muscle use, clear breath sounds without wheezes rales or rhonchi  Card:  No murmurs, normal S1, S2 without thrills, bruits or peripheral edema  Abd:  Soft, non-tender, non-distended, normoactive bowel sounds are present  Musc:  No cyanosis or clubbing  Skin:  No rashes or ulcers, skin turgor is good  Neuro:  Cranial nerves 3-12 are grossly intact,  strength is 5/5 bilaterally and dorsi / plantarflexion is 5/5 bilaterally, follows commands appropriately  Psych:  Good insight, oriented to person, place and time, alert    Medications Reviewed: (see below)    Lab Data Reviewed: (see below)    ______________________________________________________________________    Medications:     Current Facility-Administered Medications   Medication Dose Route 
JORDYN RALPH [facility]  [address] [place] [address] [phone]      Hospitalist Progress Note      NAME: Siomara Collins   :  1945  MRM:  [id number]    Date of service: 2025  11:36 AM       Assessment and Plan:   Hypotension -resolved.  Likely 2/2 UTI.      2.  UTI - h/o ESBL in the past.  UCx: citrabacter.  Continue Abx.     3.  History of pulmonary fibrosis. continue steroids; not requiring stress dose steroids currently      4.  DM.  Continue Lantus.  NPH with steroid.  Continue insulin sliding scale.     5.  Neuropathy. on gabapentin       6.  Hypertension.  Continue Coreg and lisinopril.             Subjective:     Chief Complaint:: Patient was seen and examined as a follow up for hypotension..  Chart was reviewed.  Feels well.    ROS:  (bold if positive, if negative)    Tolerating PT  Tolerating Diet     v   Objective:     Last 24hrs VS reviewed since prior progress note. Most recent are:    Vitals:    25 1121   BP: 134/83   Pulse: 83   Resp: 16   Temp: 97.7 °F (36.5 °C)   SpO2: 93%     SpO2 Readings from Last 6 Encounters:   25 93%   25 95%   25 96%   25 97%   24 99%   24 95%          Intake/Output Summary (Last 24 hours) at 2025 1136  Last data filed at 2025 1031  Gross per 24 hour   Intake --   Output 800 ml   Net -800 ml        Physical Exam:    Gen:  Well-developed, well-nourished, in no acute distress  HEENT:  Pink conjunctivae, PERRL, hearing intact to voice, moist mucous membranes  Neck:  Supple, without masses, thyroid non-tender  Resp:  No accessory muscle use, clear breath sounds without wheezes rales or rhonchi  Card:  No murmurs, normal S1, S2 without thrills, bruits or peripheral edema  Abd:  Soft, non-tender, non-distended, normoactive bowel sounds are present, no palpable organomegaly and no detectable hernias  Lymph:  No cervical or inguinal adenopathy  Musc:  No cyanosis or clubbing  Skin:  No 
Report called to Lisa baker [facility] at 12:30  
Spiritual Health History and Assessment/Progress Note  [facility]    Initial Encounter,  ,  ,      Name: Siomara Collins MRN: [id number]    Age: 80 y.o.     Sex: female   Language: English   Hindu: Mandaeism   Acute cystitis with hematuria     Date: 1/26/2025            Total Time Calculated: 19 min              Spiritual Assessment began in [facility] B4 MULTI-SPECIALTY ORTHOPEDICS 2            Encounter Overview/Reason: Initial Encounter  Service Provided For: Patient    Amy, Belief, Meaning:   Patient identifies as spiritual, is connected with a amy tradition or spiritual practice, and has beliefs or practices that help with coping during difficult times  Family/Friends No family/friends present      Importance and Influence:  Patient has spiritual/personal beliefs that influence decisions regarding their health  Family/Friends No family/friends present    Community:  Patient feels well-supported. Support system includes: Children and Other: neighbors at [facility] Living   Family/Friends No family/friends present    Assessment and Plan of Care:   Reviewed chart prior to encounter at bedside. Ms Collins greets visit, remembers  from previous hospitalizations, this one of 5 hospitalizations recently she reports. She expressed feeling well and doing well, anticipating discharge back to her apartment at assisted living facilities. She expressed her gratitude.       Patient Interventions include: Facilitated expression of thoughts and feelings, Explored spiritual coping/struggle/distress, Engaged in theological reflection, Affirmed coping skills/support systems, and Facilitated life review and/ or legacy  Family/Friends Interventions include: No family/friends present    Patient Plan of Care: Spiritual Care available upon further referral  Family/Friends Plan of Care: No family/friends present    Electronically signed by CYNTHIA Trammell on 1/26/2025 at 4:51 PM  For  support, please call 
Spiritual Health History and Assessment/Progress Note  [facility]    Rituals, Rites and Sacraments,  ,  ,      Name: Siomara Collins MRN: [id number]    Age: 80 y.o.     Sex: female   Language: English   Episcopal: Voodoo   Acute cystitis with hematuria     Date: 1/28/2025            Total Time Calculated: 5 min              Spiritual Assessment continued in [facility] B4 MULTI-SPECIALTY ORTHOPEDICS 2        Referral/Consult From: Clergy/   Encounter Overview/Reason: Rituals, Rites and Sacraments  Service Provided For: Patient    Amy, Belief, Meaning:   Patient is connected with a amy tradition or spiritual practice  Family/Friends No family/friends present      Importance and Influence:  Patient has spiritual/personal beliefs that influence decisions regarding their health  Family/Friends No family/friends present    Community:  Patient is connected with a spiritual community  Family/Friends No family/friends present    Assessment and Plan of Care:     Patient Interventions include: Provided sacramental/Faith ritual  Family/Friends Interventions include: No family/friends present    Patient Plan of Care: Spiritual Care available upon further referral  Family/Friends Plan of Care: Spiritual Care available upon further referral    Electronically signed by Chaplain BASILIO on 1/28/2025 at 2:05 PM     [facility]  Mrs Collins is Voodoo. She reported that she is going home. Prayer and communion offered before her discharge.     Sr. CAMRYN Singh, RN, ACSW, LCSW   Page:  [phone]  
ulcers, skin turgor is good  Neuro:  Cranial nerves are grossly intact, no focal motor weakness, follows commands appropriately  Psych:  Good insight, oriented to person, place and time, alert  __________________________________________________________________  Medications Reviewed: (see below)  Medications:     Current Facility-Administered Medications   Medication Dose Route Frequency    lactobacillus (CULTURELLE) capsule 1 capsule  1 capsule Oral Daily with breakfast    lisinopril (PRINIVIL;ZESTRIL) tablet 10 mg  10 mg Oral Daily    insulin NPH (HumuLIN N;NovoLIN N) injection vial 24 Units  24 Units SubCUTAneous QAM    insulin lispro (HUMALOG,ADMELOG) injection vial 12 Units  12 Units SubCUTAneous TID WC    glucose chewable tablet 16 g  4 tablet Oral PRN    dextrose bolus 10% 125 mL  125 mL IntraVENous PRN    Or    dextrose bolus 10% 250 mL  250 mL IntraVENous PRN    glucagon injection 1 mg  1 mg SubCUTAneous PRN    dextrose 10 % infusion   IntraVENous Continuous PRN    insulin lispro (HUMALOG,ADMELOG) injection vial 0-8 Units  0-8 Units SubCUTAneous 4x Daily AC & HS    meropenem (MERREM) 1,000 mg in sodium chloride 0.9 % 100 mL IVPB (mini-bag)  1,000 mg IntraVENous Q8H    carvedilol (COREG) tablet 6.25 mg  6.25 mg Oral BID WC    escitalopram (LEXAPRO) tablet 10 mg  10 mg Oral Daily    ezetimibe (ZETIA) tablet 10 mg  10 mg Oral Nightly    gabapentin (NEURONTIN) capsule 100 mg  100 mg Oral BID    primidone (MYSOLINE) tablet 50 mg  50 mg Oral BID    predniSONE (DELTASONE) tablet 30 mg  30 mg Oral Daily    pantoprazole (PROTONIX) tablet 40 mg  40 mg Oral QAM AC    montelukast (SINGULAIR) tablet 10 mg  10 mg Oral Nightly    insulin glargine (LANTUS) injection vial 22 Units  22 Units SubCUTAneous Nightly    gabapentin (NEURONTIN) capsule 300 mg  300 mg Oral Nightly    sodium chloride flush 0.9 % injection 5-40 mL  5-40 mL IntraVENous 2 times per day    sodium chloride flush 0.9 % injection 5-40 mL  5-40 mL IntraVENous

## 2025-01-28 NOTE — DISCHARGE INSTRUCTIONS
ACUTE DIAGNOSES:  Dehydration [E86.0]  Acute cystitis with hematuria [N30.01]  Acute cystitis without hematuria [N30.00]  Diarrhea, unspecified type [R19.7]    CHRONIC MEDICAL DIAGNOSES:  [unfilled]    DISCHARGE MEDICATIONS:   [unfilled]    It is important that you take the medication exactly as they are prescribed.   Keep your medication in the bottles provided by the pharmacist and keep a list of the medication names, dosages, and times to be taken in your wallet.   Do not take other medications without consulting your doctor.       DIET:  diabetic diet    ACTIVITY: activity as tolerated    ADDITIONAL INFORMATION: If you experience any of the following symptoms then please call your primary care physician or return to the emergency room if you cannot get hold of your doctor: Fever, chills, nausea, vomiting, diarrhea, change in mentation, falling, bleeding, shortness of breath.    FOLLOW UP CARE:   @PCP@  you are to call and set up an appointment to see them in 5 days.    Follow-up  No follow-up provider specified.      Information obtained by :  I understand that if any problems occur once I am at home I am to contact my physician.    I understand and acknowledge receipt of the instructions indicated above.                                                                                                                                           Physician's or R.N.'s Signature                                                                  Date/Time                                                                                                                                              Patient or Representative Signature                                                          Date/Time

## 2025-01-28 NOTE — CARE COORDINATION
1/28/2025  12:44 PM  CM spoke w/ ALENA Acevedo confirmed pt may return to [facility] by 2 PM today, no bedside eval required.  Pt will DC on Ceftin to complete Abx for UTI  Nursing to call report to Lisa [phone]  CM scheduled BLS transport w/ [facility] for 1300 today, packet completed and placed w/ bedside chart   Pt is agreeable to schedule [facility] appt and CM specialist will schedule and provide information on AVS  [facility] as [facility] currently receives in home therapy through [facility]  Pt is ready for DC from CM standpoint.  JEREMIAH Green        11:24 AM  Care Management Progress Note    Reason for Admission:   Dehydration [E86.0]  Acute cystitis with hematuria [N30.01]  Acute cystitis without hematuria [N30.00]  Diarrhea, unspecified type [R19.7]         Patient Admission Status: Inpatient  RUR: 37%  Hospitalization in the last 30 days (Readmission):  Yes        Transition of care plan:  Pt discussed in IDR is medically stable for DC   PT/OT treating, recc Rehab v return to home/SHAKIRA w/ [facility]  CM discussed DC w/ pt, she resides at [facility], she desires to return there does not want SNF or Rehab   CM placed TC to [facility] to confirm pt may return today, left HIPA compliant  requesting call back  Discharge plan communicated with patient and/or discharge caregiver: Yes    Date 2nd  [facility] letter given: 1/28/25  Outpatient follow-up.  Transport at discharge: BLS   Will follow  Await DC order   JEREMIAH Green

## 2025-01-28 NOTE — DISCHARGE SUMMARY
Hospitalist Discharge Summary     Patient ID:    Siomara Collins  [id number]  80 y.o.  1945    Admit date of service: 1/25/2025    Discharge date of service: 1/28/2025    Admission Diagnoses: Dehydration [E86.0]  Acute cystitis with hematuria [N30.01]  Acute cystitis without hematuria [N30.00]  Diarrhea, unspecified type [R19.7]    Chronic Diagnoses:      Discharge Medications:   Current Discharge Medication List        START taking these medications    Details   cefUROXime (CEFTIN) 500 MG tablet Take 1 tablet by mouth 2 times daily for 3 days  Qty: 6 tablet, Refills: 0           CONTINUE these medications which have NOT CHANGED    Details   insulin glargine (LANTUS SOLOSTAR) 100 UNIT/ML injection pen Take 22 units of Lantus insulin daily  Qty: 15 mL, Refills: 5      gabapentin (NEURONTIN) 100 MG capsule Take 1 tablet BID and 3 tablets at bedtime  Qty: 150 capsule, Refills: 2    Associated Diagnoses: Controlled type 2 diabetes mellitus with stage 2 chronic kidney disease, with long-term current use of insulin (HCC); Neuropathy      escitalopram (LEXAPRO) 10 MG tablet Take 1 tablet by mouth daily      insulin aspart (NOVOLOG FLEXPEN) 100 UNIT/ML injection pen Inject 12 Units into the skin 3 times daily (before meals) Also has a scale: 200-249=2 units + 2:50 > 250  Qty: 5 Adjustable Dose Pre-filled Pen Syringe, Refills: 3      insulin NPH (HUMULIN N;NOVOLIN N) 100 UNIT/ML injection pen Inject 24 units in the morning at the same time she receives 30 mg of prednisone (dispense novolin or humulin whichever is preferred)--in addition to her lantus  Qty: 15 mL, Refills: 11      lisinopril (PRINIVIL;ZESTRIL) 10 MG tablet Take 1 tablet by mouth daily  Qty: 30 tablet, Refills: 5      acetaminophen (TYLENOL) 500 MG tablet Take 2 tablets by mouth 4 times daily as needed for Pain  Qty: 120 tablet, Refills: 5      Semaglutide,0.25 or 0.5MG/DOS, (OZEMPIC, 0.25 OR 0.5 MG/DOSE,) 2 MG/3ML SOPN Inject 0.25 mg into the skin once a

## 2025-01-30 ENCOUNTER — OFFICE VISIT (OUTPATIENT)
Age: 80
End: 2025-01-30
Payer: MEDICARE

## 2025-01-30 ENCOUNTER — TELEPHONE (OUTPATIENT)
Age: 80
End: 2025-01-30

## 2025-01-30 VITALS
HEART RATE: 98 BPM | DIASTOLIC BLOOD PRESSURE: 70 MMHG | SYSTOLIC BLOOD PRESSURE: 94 MMHG | HEIGHT: 63 IN | BODY MASS INDEX: 35.87 KG/M2

## 2025-01-30 DIAGNOSIS — I10 ESSENTIAL (PRIMARY) HYPERTENSION: ICD-10-CM

## 2025-01-30 DIAGNOSIS — R60.9 EDEMA, UNSPECIFIED TYPE: ICD-10-CM

## 2025-01-30 DIAGNOSIS — Z79.4 LONG TERM (CURRENT) USE OF INSULIN (HCC): ICD-10-CM

## 2025-01-30 DIAGNOSIS — E11.29 TYPE 2 DIABETES MELLITUS WITH OTHER DIABETIC KIDNEY COMPLICATION (HCC): Primary | ICD-10-CM

## 2025-01-30 DIAGNOSIS — E78.2 MIXED HYPERLIPIDEMIA: ICD-10-CM

## 2025-01-30 LAB — HBA1C MFR BLD: 8.5 %

## 2025-01-30 PROCEDURE — G8400 PT W/DXA NO RESULTS DOC: HCPCS | Performed by: INTERNAL MEDICINE

## 2025-01-30 PROCEDURE — G8427 DOCREV CUR MEDS BY ELIG CLIN: HCPCS | Performed by: INTERNAL MEDICINE

## 2025-01-30 PROCEDURE — 1036F TOBACCO NON-USER: CPT | Performed by: INTERNAL MEDICINE

## 2025-01-30 PROCEDURE — G8417 CALC BMI ABV UP PARAM F/U: HCPCS | Performed by: INTERNAL MEDICINE

## 2025-01-30 PROCEDURE — 83036 HEMOGLOBIN GLYCOSYLATED A1C: CPT | Performed by: INTERNAL MEDICINE

## 2025-01-30 PROCEDURE — 3078F DIAST BP <80 MM HG: CPT | Performed by: INTERNAL MEDICINE

## 2025-01-30 PROCEDURE — 1111F DSCHRG MED/CURRENT MED MERGE: CPT | Performed by: INTERNAL MEDICINE

## 2025-01-30 PROCEDURE — 99215 OFFICE O/P EST HI 40 MIN: CPT | Performed by: INTERNAL MEDICINE

## 2025-01-30 PROCEDURE — 1090F PRES/ABSN URINE INCON ASSESS: CPT | Performed by: INTERNAL MEDICINE

## 2025-01-30 PROCEDURE — PBSHW AMB POC HEMOGLOBIN A1C: Performed by: INTERNAL MEDICINE

## 2025-01-30 PROCEDURE — 3074F SYST BP LT 130 MM HG: CPT | Performed by: INTERNAL MEDICINE

## 2025-01-30 PROCEDURE — 1125F AMNT PAIN NOTED PAIN PRSNT: CPT | Performed by: INTERNAL MEDICINE

## 2025-01-30 PROCEDURE — G2211 COMPLEX E/M VISIT ADD ON: HCPCS | Performed by: INTERNAL MEDICINE

## 2025-01-30 PROCEDURE — 1123F ACP DISCUSS/DSCN MKR DOCD: CPT | Performed by: INTERNAL MEDICINE

## 2025-01-30 PROCEDURE — 1160F RVW MEDS BY RX/DR IN RCRD: CPT | Performed by: INTERNAL MEDICINE

## 2025-01-30 PROCEDURE — 1159F MED LIST DOCD IN RCRD: CPT | Performed by: INTERNAL MEDICINE

## 2025-01-30 RX ORDER — ROSUVASTATIN CALCIUM 20 MG/1
20 TABLET, COATED ORAL NIGHTLY
Qty: 30 TABLET | Refills: 11 | Status: SHIPPED | OUTPATIENT
Start: 2025-01-30

## 2025-01-30 RX ORDER — EZETIMIBE 10 MG/1
10 TABLET ORAL NIGHTLY
Qty: 30 TABLET | Refills: 11 | Status: SHIPPED | OUTPATIENT
Start: 2025-01-30

## 2025-01-30 RX ORDER — INSULIN GLARGINE 100 [IU]/ML
INJECTION, SOLUTION SUBCUTANEOUS
Qty: 15 ML | Refills: 5 | Status: SHIPPED | OUTPATIENT
Start: 2025-01-30

## 2025-01-30 RX ORDER — INSULIN ASPART 100 [IU]/ML
10 INJECTION, SOLUTION INTRAVENOUS; SUBCUTANEOUS
Qty: 5 ADJUSTABLE DOSE PRE-FILLED PEN SYRINGE | Refills: 3 | Status: SHIPPED | OUTPATIENT
Start: 2025-01-30

## 2025-01-30 RX ORDER — SEMAGLUTIDE 0.68 MG/ML
0.5 INJECTION, SOLUTION SUBCUTANEOUS WEEKLY
Qty: 3 ML | Refills: 11 | Status: SHIPPED | OUTPATIENT
Start: 2025-01-30

## 2025-01-30 RX ORDER — KETOROLAC TROMETHAMINE 30 MG/ML
INJECTION, SOLUTION INTRAMUSCULAR; INTRAVENOUS
Qty: 1 EACH | Refills: 0 | Status: SHIPPED | OUTPATIENT
Start: 2025-01-30

## 2025-01-30 RX ORDER — LORATADINE 10 MG/1
10 TABLET ORAL DAILY
COMMUNITY

## 2025-01-30 RX ORDER — GABAPENTIN 300 MG/1
300 CAPSULE ORAL
COMMUNITY

## 2025-01-30 RX ORDER — HYDROCHLOROTHIAZIDE 12.5 MG/1
CAPSULE ORAL
Qty: 6 EACH | Refills: 3 | Status: SHIPPED | OUTPATIENT
Start: 2025-01-30

## 2025-01-30 NOTE — PROGRESS NOTES
apply route daily    montelukast (SINGULAIR) 10 MG tablet Take 1 tablet by mouth nightly    primidone (MYSOLINE) 50 MG tablet Take 1 tablet by mouth in the morning and at bedtime    diclofenac sodium (VOLTAREN) 1 % GEL Apply 2 g topically in the morning, at noon, in the evening, and at bedtime    lanolin-petrolatum (A+D) ointment Apply topically 2 times daily as needed for Dry Skin    Multiple Vitamins-Minerals (EQ MULTIVITAMINS ADULT GUMMY) CHEW Take 1 tablet by mouth daily Naturemade gummy multivitamin    omeprazole (PRILOSEC) 20 MG delayed release capsule Take 1 capsule by mouth Daily    Lancets 30G MISC 1 each by Does not apply route in the morning, at noon, in the evening, and at bedtime Test four times a day & as needed for symptoms of irregular blood glucose. Dispense sufficient amount for indicated testing frequency plus additional to accommodate PRN testing needs.     No current facility-administered medications for this visit.     Allergies   Allergen Reactions    Levaquin [Levofloxacin] Itching, Dermatitis and Rash    Statins Myalgia     Myalgia with  multiple statins  Takes pravachol     Propoxyphene Other (See Comments)     \"I see worms\"    Codeine Rash     Rash on thighs    Metformin Diarrhea    Penicillins Rash    Sulfa Antibiotics Rash       Review of Systems: PER HPI    Physical Examination:  Blood pressure 94/70, pulse 98, height 1.6 m (5' 3\").  General: pleasant, no distress, good eye contact   Full exam not performed  Psychiatric: normal mood and affect    Data Reviewed:   Component      Latest Ref Rng 1/30/2025   Hemoglobin A1C, POC      % 8.5      Component      Latest Ref Rng 1/27/2025   Sodium      136 - 145 mmol/L 141    Potassium      3.5 - 5.1 mmol/L 3.8    Chloride      97 - 108 mmol/L 109 (H)    CARBON DIOXIDE      21 - 32 mmol/L 28    Anion Gap      2 - 12 mmol/L 4    Glucose      65 - 100 mg/dL 244 (H)    BUN,BUNPL      6 - 20 MG/DL 23 (H)    Creatinine      0.55 - 1.02 MG/DL 0.66

## 2025-01-30 NOTE — TELEPHONE ENCOUNTER
Please fax her new insulin orders to [facility] and confirm receipt by calling [phone].  Thanks!

## 2025-01-31 ENCOUNTER — TELEPHONE (OUTPATIENT)
Age: 80
End: 2025-01-31

## 2025-01-31 NOTE — TELEPHONE ENCOUNTER
Spoke with patient after ID x2 and Rs'd to 2/5 VV- patient has difficulty getting to appointments due to living in a facility.     Patient will have facility fax weights and BP the day prior to VV

## 2025-01-31 NOTE — TELEPHONE ENCOUNTER
Nurse at [facility] stated she did not receive the fax but someone else may have. She asked that it be faxed again. Asked nurse to call office back and let us know if the fax has been received. Nurse also stated that pt will need an order to self administer Ozempic as they can not administer at the facility. New insulin orders faxed again to [phone]

## 2025-01-31 NOTE — TELEPHONE ENCOUNTER
Patient requested  a virtual appt with dr sterling  And requested a call back     Also to discuss other medical issues she's having       Contact Information  [phone] (Mobile)   [phone] (Home Phone)

## 2025-02-03 NOTE — TELEPHONE ENCOUNTER
Attempted to call [facility] again to confirm if the orders have been received. Could not leave a message.

## 2025-02-05 ENCOUNTER — TELEMEDICINE (OUTPATIENT)
Age: 80
End: 2025-02-05
Payer: MEDICARE

## 2025-02-05 DIAGNOSIS — Z95.0 CARDIAC PACEMAKER IN SITU: Primary | ICD-10-CM

## 2025-02-05 DIAGNOSIS — R78.81 BACTEREMIA: ICD-10-CM

## 2025-02-05 DIAGNOSIS — R00.1 BRADYCARDIA: ICD-10-CM

## 2025-02-05 PROCEDURE — 1036F TOBACCO NON-USER: CPT | Performed by: INTERNAL MEDICINE

## 2025-02-05 PROCEDURE — 99213 OFFICE O/P EST LOW 20 MIN: CPT | Performed by: INTERNAL MEDICINE

## 2025-02-05 PROCEDURE — G8400 PT W/DXA NO RESULTS DOC: HCPCS | Performed by: INTERNAL MEDICINE

## 2025-02-05 PROCEDURE — G8417 CALC BMI ABV UP PARAM F/U: HCPCS | Performed by: INTERNAL MEDICINE

## 2025-02-05 PROCEDURE — 1090F PRES/ABSN URINE INCON ASSESS: CPT | Performed by: INTERNAL MEDICINE

## 2025-02-05 PROCEDURE — G8428 CUR MEDS NOT DOCUMENT: HCPCS | Performed by: INTERNAL MEDICINE

## 2025-02-05 PROCEDURE — 1123F ACP DISCUSS/DSCN MKR DOCD: CPT | Performed by: INTERNAL MEDICINE

## 2025-02-05 PROCEDURE — 1111F DSCHRG MED/CURRENT MED MERGE: CPT | Performed by: INTERNAL MEDICINE

## 2025-02-05 ASSESSMENT — PATIENT HEALTH QUESTIONNAIRE - PHQ9
2. FEELING DOWN, DEPRESSED OR HOPELESS: SEVERAL DAYS
1. LITTLE INTEREST OR PLEASURE IN DOING THINGS: MORE THAN HALF THE DAYS
SUM OF ALL RESPONSES TO PHQ QUESTIONS 1-9: 3
SUM OF ALL RESPONSES TO PHQ9 QUESTIONS 1 & 2: 3
SUM OF ALL RESPONSES TO PHQ QUESTIONS 1-9: 3

## 2025-02-05 NOTE — PROGRESS NOTES
No chief complaint on file.    There were no vitals filed for this visit.   Is currently on Hospice - last week   Did exploratory and was under anesthesia so she does not know the outcome. Would like to know.     Chest pain denied   SOB denied   Palpitations denied   Swelling in hands/feet denied   Dizziness denied   Recent hospital stays 01/25/25    Refills denied

## 2025-02-05 NOTE — PROGRESS NOTES
Patient: Siomara Collins  : 1945    Primary Cardiologist:Dr. NACHO Rubi  EP Cardiologist:Dr. JEANNE Castaneda   Primary [facility] cardiologist:   PCP: Irvin Vang MD    Today's Date: 2025       hospital FU.    Siomara Collins, was evaluated through a synchronous (real-time) audio-video encounter. The patient (or guardian if applicable) is aware that this is a billable service, which includes applicable co-pays. This Virtual Visit was conducted with patient's (and/or legal guardian's) consent. Patient identification was verified, and a caregiver was present when appropriate.   The patient was located at Home: [address]  [address]  [facility] [address]  Provider was located at Facility (Appt Dept): [address] [place] [address] [address]  [place] [address]  Confirm you are appropriately licensed, registered, or certified to deliver care in the state where the patient is located as indicated above. If you are not or unsure, please re-schedule the visit: Yes, I confirm.        Total time spent for this encounter:  25    --Nella Rubi MD on 2025 at 12:09 PM    An electronic signature was used to authenticate this note.     ASSESSMENT AND PLAN:     Assessment and Plan:  Hospital Follow Up  Was admitted in 2024 with bradycardia found to have complete heart block and subsequently underwent pacemaker.    She was subsequently admitted with GI and bacteremia, Enterococcus.  She had a TTE followed by [facility] that did not show any vegetations on her knee pacemaker.    She was given IV antibiotics via PICC line and discharged to [facility] hospice where she is right now.    She/we arrange has hospital follow-up appointment to touch base-she sounds well and her main question was what did I do with her.  I explained to her given the bacteremia she had an indication to undergo a transesophageal echocardiogram which we did together and did not show any vegetations on her pacemaker.    She

## 2025-02-06 ENCOUNTER — TELEPHONE (OUTPATIENT)
Facility: CLINIC | Age: 80
End: 2025-02-06

## 2025-02-06 NOTE — TELEPHONE ENCOUNTER
Spoke with patient to let her know that in order for Dr. Vang to fill out the paperwork for her wheelchair she has to have a face to face with Dr. Vang and it has to be discussed in his note as well and I did advise her that these are the requirements from the paperwork. Patient verbalized understanding and patient stated she will be here on 2/17 for her appointment.

## 2025-02-07 NOTE — TELEPHONE ENCOUNTER
rep Madden stated nurse is currently in a meeting and will check with the nurse to see if the orders were received via fax.

## 2025-02-15 NOTE — PROGRESS NOTES
[facility] Cardiology  Cardiac Electrophysiology Clinic Care Note                  []Initial visit     [x]Established visit     Patient Name: Siomara Collins - :1945 - MRN:[id number]  Primary Cardiologist: Nella Rubi MD  Electrophysiologist: Raysa Castaneda MD     Reason for visit: Pacemaker follow up    HPI:  Ms. Collins is a 80 y.o. female who presents for follow up, is s/p Medtronic dual chamber left bundle pacemaker (DOI 2024).    She reports doing well from a cardiac standpoint.  On chronic steroid therapy, believes she may have gained a bit of weight.  She denies chest pain, palpitations, or syncope.  Occasional lightheadedness when hypoglycemic.  JOSE has been stable.    [facility] in 2025 showed LVEF 45-50% with mild to moderate MR, no apparent vegetation.    BP controlled.      Previous:  Admitted with Citrabacter UTI in 2025.    Admitted in 2024 with pneumonia, sepsis, & Enterococcus bacteremia.    Medtronic dual chamber left bundle pacemaker (DOI 2024) for 2:1 AVB.    History of pulmonary fibrosis & PMR, on chronic steroids.       Assessment and Plan       ICD-10-CM    1. Pacemaker  Z95.0       2. 2nd degree AV block  I44.1       3. Nonrheumatic mitral valve regurgitation  I34.0       4. Primary hypertension  I10       5. Polymyalgia rheumatica  M35.3           Medtronic dual chamber left bundle pacemaker (DOI 2024):  -Implanted for 2:1 AVB.  -Device check today shows proper lead & generator function.  RA lead threshold slightly higher than previous; will continue to monitor.  Generator longevity estimated 12.8 years.  RA 2.5%, RV 96.7%.  AT/AF <0.1%.  NSVT x 3, longest 3 seconds.  Atrial ATP programmed on.  -Remote pacer checks q 3 months.    MR:   -Mild to moderate per [facility] in 2025.  -Monitor via serial echocardiograms.    HTN:  -BP controlled.  -Continue carvedilol & lisinopril.  -Recommend reduced sodium diet & routine exercise.    PMR:  -On chronic

## 2025-02-17 ENCOUNTER — OFFICE VISIT (OUTPATIENT)
Facility: CLINIC | Age: 80
End: 2025-02-17
Payer: MEDICARE

## 2025-02-17 VITALS
TEMPERATURE: 98.8 F | DIASTOLIC BLOOD PRESSURE: 60 MMHG | HEIGHT: 63 IN | HEART RATE: 85 BPM | OXYGEN SATURATION: 94 % | BODY MASS INDEX: 34.46 KG/M2 | SYSTOLIC BLOOD PRESSURE: 100 MMHG | WEIGHT: 194.5 LBS | RESPIRATION RATE: 18 BRPM

## 2025-02-17 DIAGNOSIS — R26.81 GAIT INSTABILITY: ICD-10-CM

## 2025-02-17 DIAGNOSIS — J84.9 INTERSTITIAL LUNG DISEASE (HCC): ICD-10-CM

## 2025-02-17 DIAGNOSIS — M15.0 PRIMARY OSTEOARTHRITIS INVOLVING MULTIPLE JOINTS: ICD-10-CM

## 2025-02-17 DIAGNOSIS — M48.061 SPINAL STENOSIS OF LUMBAR REGION, UNSPECIFIED WHETHER NEUROGENIC CLAUDICATION PRESENT: ICD-10-CM

## 2025-02-17 DIAGNOSIS — I10 PRIMARY HYPERTENSION: ICD-10-CM

## 2025-02-17 DIAGNOSIS — J96.11 CHRONIC HYPOXEMIC RESPIRATORY FAILURE (HCC): Primary | ICD-10-CM

## 2025-02-17 DIAGNOSIS — E86.0 DEHYDRATION: ICD-10-CM

## 2025-02-17 DIAGNOSIS — N39.0 RECURRENT UTI: ICD-10-CM

## 2025-02-17 DIAGNOSIS — I50.42 CHRONIC COMBINED SYSTOLIC AND DIASTOLIC CONGESTIVE HEART FAILURE (HCC): ICD-10-CM

## 2025-02-17 DIAGNOSIS — R53.81 PHYSICAL DEBILITY: ICD-10-CM

## 2025-02-17 PROBLEM — I50.9 ACUTE ON CHRONIC CONGESTIVE HEART FAILURE, UNSPECIFIED HEART FAILURE TYPE (HCC): Status: RESOLVED | Noted: 2025-01-16 | Resolved: 2025-02-17

## 2025-02-17 PROCEDURE — 3074F SYST BP LT 130 MM HG: CPT | Performed by: INTERNAL MEDICINE

## 2025-02-17 PROCEDURE — 3078F DIAST BP <80 MM HG: CPT | Performed by: INTERNAL MEDICINE

## 2025-02-17 PROCEDURE — G8417 CALC BMI ABV UP PARAM F/U: HCPCS | Performed by: INTERNAL MEDICINE

## 2025-02-17 PROCEDURE — G8427 DOCREV CUR MEDS BY ELIG CLIN: HCPCS | Performed by: INTERNAL MEDICINE

## 2025-02-17 PROCEDURE — 1159F MED LIST DOCD IN RCRD: CPT | Performed by: INTERNAL MEDICINE

## 2025-02-17 PROCEDURE — 99215 OFFICE O/P EST HI 40 MIN: CPT | Performed by: INTERNAL MEDICINE

## 2025-02-17 PROCEDURE — 1036F TOBACCO NON-USER: CPT | Performed by: INTERNAL MEDICINE

## 2025-02-17 PROCEDURE — 1090F PRES/ABSN URINE INCON ASSESS: CPT | Performed by: INTERNAL MEDICINE

## 2025-02-17 PROCEDURE — 1111F DSCHRG MED/CURRENT MED MERGE: CPT | Performed by: INTERNAL MEDICINE

## 2025-02-17 PROCEDURE — 1126F AMNT PAIN NOTED NONE PRSNT: CPT | Performed by: INTERNAL MEDICINE

## 2025-02-17 PROCEDURE — 1123F ACP DISCUSS/DSCN MKR DOCD: CPT | Performed by: INTERNAL MEDICINE

## 2025-02-17 PROCEDURE — G8400 PT W/DXA NO RESULTS DOC: HCPCS | Performed by: INTERNAL MEDICINE

## 2025-02-17 RX ORDER — LIDOCAINE 50 MG/G
1 PATCH TOPICAL DAILY
Qty: 30 PATCH | Refills: 3 | Status: SHIPPED | OUTPATIENT
Start: 2025-02-17 | End: 2025-06-17

## 2025-02-17 NOTE — TELEPHONE ENCOUNTER
Can you check with the patient and have her confirm that she is receiving the correct doses of ozempic and insulin as ordered?

## 2025-02-17 NOTE — PROGRESS NOTES
Chief Complaint   Patient presents with    1 Month Follow-Up       SUBJECTIVE:    Siomara Collins is a 80 y.o. female who returns in follow-up accompanied by her son for her severe multiple medical problems include chronic interstitial lung disease, chronic hypoxemic respiratory failure, chronic combined diastolic and systolic CHF, diabetes mellitus, hepatic steatosis, recurrent urinary tract infection, CKD stage II, and other multiple medical problems including a recent hospitalization where she was hospitalized January 25 to January 28 secondary to dehydration and acute cystitis.  She did have hypotension on admission which is felt secondary to combination of a urine infection and the dehydration she was treated in hospital with meropenem and discharged home on Ceftin 500 mg twice daily for 3 additional days.  She has now completed that antibiotic and does not have any further urinary symptoms.  She denies any chest pain, palpitations, PND, orthopnea or other new cardiorespiratory complaints but does have a chronic dyspnea for which she wears oxygen.  She has marked limited mobility secondary to her multiple medical problems including those mentioned above and as result of that is completely wheelchair-bound.  She has been assessed by physical therapy and found to have the upper body strength and coordination to be able to manage a motorized wheelchair.  She denies any current GI or  complaints.  She notes no headaches, dizziness or neurologic complaints other than her generalized weakness.  She has no new arthritic complaints.      Current Outpatient Medications   Medication Sig Dispense Refill    gabapentin (NEURONTIN) 300 MG capsule Take 1 capsule by mouth nightly.      loratadine (CLARITIN) 10 MG tablet Take 1 tablet by mouth daily      Semaglutide,0.25 or 0.5MG/DOS, (OZEMPIC, 0.25 OR 0.5 MG/DOSE,) 2 MG/3ML SOPN Inject 0.5 mg into the skin once a week On Wednesday 3 mL 11    insulin NPH (HUMULIN N;NOVOLIN

## 2025-02-17 NOTE — PROGRESS NOTES
Siomara Collins is a 80 y.o. female     Chief Complaint   Patient presents with    1 Month Follow-Up       /60 (Site: Left Upper Arm, Position: Sitting, Cuff Size: Large Adult)   Pulse 85   Temp 98.8 °F (37.1 °C) (Oral)   Resp 18   Ht 1.6 m (5' 3\")   Wt 88.2 kg (194 lb 8 oz)   SpO2 94%   BMI 34.45 kg/m²     Health Maintenance Due   Topic Date Due    COVID-19 Vaccine (1) Never done    DEXA (modify frequency per FRAX score)  Never done    Shingles vaccine (1 of 2) 02/26/2017    Respiratory Syncytial Virus (RSV) Pregnant or age 60 yrs+ (1 - 1-dose 75+ series) Never done    Flu vaccine (1) 08/01/2024         \"Have you been to the ER, urgent care clinic since your last visit?  Hospitalized since your last visit?\"    NO    “Have you seen or consulted any other health care providers outside of [facility] since your last visit?”    NO

## 2025-02-17 NOTE — TELEPHONE ENCOUNTER
Attempted to contact pt and she could answer her phone but could not hear nurse. Will call again later.

## 2025-02-18 LAB
ANION GAP SERPL CALC-SCNC: 11 MMOL/L (ref 2–12)
BASOPHILS # BLD: 0.02 K/UL (ref 0–0.1)
BASOPHILS NFR BLD: 0.2 % (ref 0–1)
BUN SERPL-MCNC: 24 MG/DL (ref 6–20)
BUN/CREAT SERPL: 24 (ref 12–20)
CALCIUM SERPL-MCNC: 9.2 MG/DL (ref 8.5–10.1)
CHLORIDE SERPL-SCNC: 102 MMOL/L (ref 97–108)
CO2 SERPL-SCNC: 27 MMOL/L (ref 21–32)
CREAT SERPL-MCNC: 1.01 MG/DL (ref 0.55–1.02)
DIFFERENTIAL METHOD BLD: ABNORMAL
EOSINOPHIL # BLD: 0.01 K/UL (ref 0–0.4)
EOSINOPHIL NFR BLD: 0.1 % (ref 0–7)
ERYTHROCYTE [DISTWIDTH] IN BLOOD BY AUTOMATED COUNT: 14.8 % (ref 11.5–14.5)
GLUCOSE SERPL-MCNC: 260 MG/DL (ref 65–100)
HCT VFR BLD AUTO: 44.8 % (ref 35–47)
HGB BLD-MCNC: 14.5 G/DL (ref 11.5–16)
IMM GRANULOCYTES # BLD AUTO: 0.05 K/UL (ref 0–0.04)
IMM GRANULOCYTES NFR BLD AUTO: 0.6 % (ref 0–0.5)
LYMPHOCYTES # BLD: 0.35 K/UL (ref 0.8–3.5)
LYMPHOCYTES NFR BLD: 4.3 % (ref 12–49)
MCH RBC QN AUTO: 32.2 PG (ref 26–34)
MCHC RBC AUTO-ENTMCNC: 32.4 G/DL (ref 30–36.5)
MCV RBC AUTO: 99.6 FL (ref 80–99)
MONOCYTES # BLD: 0.22 K/UL (ref 0–1)
MONOCYTES NFR BLD: 2.7 % (ref 5–13)
NEUTS SEG # BLD: 7.46 K/UL (ref 1.8–8)
NEUTS SEG NFR BLD: 92.1 % (ref 32–75)
NRBC # BLD: 0 K/UL (ref 0–0.01)
NRBC BLD-RTO: 0 PER 100 WBC
PLATELET # BLD AUTO: 215 K/UL (ref 150–400)
PMV BLD AUTO: 10.9 FL (ref 8.9–12.9)
POTASSIUM SERPL-SCNC: 3.8 MMOL/L (ref 3.5–5.1)
RBC # BLD AUTO: 4.5 M/UL (ref 3.8–5.2)
RBC MORPH BLD: ABNORMAL
RBC MORPH BLD: ABNORMAL
SODIUM SERPL-SCNC: 140 MMOL/L (ref 136–145)
WBC # BLD AUTO: 8.1 K/UL (ref 3.6–11)

## 2025-02-20 ENCOUNTER — OFFICE VISIT (OUTPATIENT)
Age: 80
End: 2025-02-20
Payer: MEDICARE

## 2025-02-20 ENCOUNTER — PROCEDURE VISIT (OUTPATIENT)
Age: 80
End: 2025-02-20
Payer: MEDICARE

## 2025-02-20 VITALS
DIASTOLIC BLOOD PRESSURE: 60 MMHG | BODY MASS INDEX: 34.45 KG/M2 | OXYGEN SATURATION: 96 % | SYSTOLIC BLOOD PRESSURE: 110 MMHG | HEART RATE: 84 BPM | HEIGHT: 63 IN

## 2025-02-20 DIAGNOSIS — I10 PRIMARY HYPERTENSION: ICD-10-CM

## 2025-02-20 DIAGNOSIS — I44.1 2ND DEGREE AV BLOCK: ICD-10-CM

## 2025-02-20 DIAGNOSIS — Z95.0 CARDIAC PACEMAKER IN SITU: Primary | ICD-10-CM

## 2025-02-20 DIAGNOSIS — I34.0 NONRHEUMATIC MITRAL VALVE REGURGITATION: ICD-10-CM

## 2025-02-20 DIAGNOSIS — M35.3 POLYMYALGIA RHEUMATICA: ICD-10-CM

## 2025-02-20 DIAGNOSIS — Z95.0 PACEMAKER: Primary | ICD-10-CM

## 2025-02-20 PROCEDURE — 93280 PM DEVICE PROGR EVAL DUAL: CPT | Performed by: INTERNAL MEDICINE

## 2025-02-20 PROCEDURE — G8417 CALC BMI ABV UP PARAM F/U: HCPCS | Performed by: NURSE PRACTITIONER

## 2025-02-20 PROCEDURE — 1036F TOBACCO NON-USER: CPT | Performed by: NURSE PRACTITIONER

## 2025-02-20 PROCEDURE — 99214 OFFICE O/P EST MOD 30 MIN: CPT | Performed by: NURSE PRACTITIONER

## 2025-02-20 PROCEDURE — 1123F ACP DISCUSS/DSCN MKR DOCD: CPT | Performed by: NURSE PRACTITIONER

## 2025-02-20 PROCEDURE — 1111F DSCHRG MED/CURRENT MED MERGE: CPT | Performed by: NURSE PRACTITIONER

## 2025-02-20 PROCEDURE — 1090F PRES/ABSN URINE INCON ASSESS: CPT | Performed by: NURSE PRACTITIONER

## 2025-02-20 PROCEDURE — 1126F AMNT PAIN NOTED NONE PRSNT: CPT | Performed by: NURSE PRACTITIONER

## 2025-02-20 PROCEDURE — 1159F MED LIST DOCD IN RCRD: CPT | Performed by: NURSE PRACTITIONER

## 2025-02-20 PROCEDURE — 3074F SYST BP LT 130 MM HG: CPT | Performed by: NURSE PRACTITIONER

## 2025-02-20 PROCEDURE — G8400 PT W/DXA NO RESULTS DOC: HCPCS | Performed by: NURSE PRACTITIONER

## 2025-02-20 PROCEDURE — G8427 DOCREV CUR MEDS BY ELIG CLIN: HCPCS | Performed by: NURSE PRACTITIONER

## 2025-02-20 PROCEDURE — 3078F DIAST BP <80 MM HG: CPT | Performed by: NURSE PRACTITIONER

## 2025-02-20 NOTE — PROGRESS NOTES
Chief Complaint   Patient presents with    Bradycardia    AV Block    Congestive Heart Failure     Vitals:    02/20/25 0945   BP: 110/60   Site: Left Upper Arm   Position: Sitting   Pulse: 84   SpO2: 96%   Height: 1.6 m (5' 3\")         Chest pain: DENIED     Recent hospital stays: DENIED     Refills: DENIED     Patient unable to stand on scale for weight today!

## 2025-02-24 NOTE — TELEPHONE ENCOUNTER
Pt called and LVM 2/24 @ 10:45 AM    Pt stated Dr Colon has been trying to reach her nursing staff at the [facility] to go over her insulin routine. We have not been able to connect. She sent us a new phone number through text message named echo waller and she gave us her direct phone number. She hopes that will help us get in touch with her to go over her routine. No phone number was given.    Pt# [phone]

## 2025-03-05 NOTE — TELEPHONE ENCOUNTER
Contacted pt and she gave the number to ELHAM Cabrera [phone]. LVM asking someone return call with pt's most recent diabetes medication regimen. Callback and fax number left.

## 2025-03-17 NOTE — TELEPHONE ENCOUNTER
Spoke with Aditi at [facility] and she confirmed pt is being administered NPH 50 units in the AM and Lantus 25 units at bedtime. Aditi stated she has not received the Novolog prescription stating pt is to take 10 units before meals w/ SSI. She asked that last note and last RX be faxed to [phone]. Note and RX faxed, confirmation received.  Aditi also stated pt's hospice nurse administers Ozempic but it needs a PA.     PA initiated through covermymeds.com for Ozempic 0.25-0.5 mg    Ozmepic approved.  Per [employer]: \"The following prescription is already covered by the plan with no restrictions.\"

## 2025-03-18 NOTE — TELEPHONE ENCOUNTER
Since we had already received confirmation on our end in the past that our fax went through but the nursing staff did not confirm they received it, are you able to speak with Aditi to confirm receipt of the novolog order and let her know that the ozempic does not need a PA.  Thanks!

## 2025-03-19 NOTE — TELEPHONE ENCOUNTER
Aditi confirmed the fax was received and pt is now taking correctly. Informed her Ozempic is covered and she stated hospice nurse will be notified.

## 2025-04-13 PROCEDURE — 93294 REM INTERROG EVL PM/LDLS PM: CPT | Performed by: INTERNAL MEDICINE

## 2025-05-01 ENCOUNTER — OFFICE VISIT (OUTPATIENT)
Age: 80
End: 2025-05-01
Payer: MEDICARE

## 2025-05-01 ENCOUNTER — TELEPHONE (OUTPATIENT)
Age: 80
End: 2025-05-01

## 2025-05-01 VITALS
HEART RATE: 59 BPM | SYSTOLIC BLOOD PRESSURE: 101 MMHG | HEIGHT: 63 IN | DIASTOLIC BLOOD PRESSURE: 64 MMHG | BODY MASS INDEX: 34.45 KG/M2

## 2025-05-01 DIAGNOSIS — E11.29 TYPE 2 DIABETES MELLITUS WITH OTHER DIABETIC KIDNEY COMPLICATION (HCC): Primary | ICD-10-CM

## 2025-05-01 DIAGNOSIS — E78.2 MIXED HYPERLIPIDEMIA: ICD-10-CM

## 2025-05-01 DIAGNOSIS — I10 ESSENTIAL (PRIMARY) HYPERTENSION: ICD-10-CM

## 2025-05-01 DIAGNOSIS — Z79.4 LONG TERM (CURRENT) USE OF INSULIN (HCC): ICD-10-CM

## 2025-05-01 DIAGNOSIS — R60.9 EDEMA, UNSPECIFIED TYPE: ICD-10-CM

## 2025-05-01 LAB — HBA1C MFR BLD: 10.5 %

## 2025-05-01 PROCEDURE — 3078F DIAST BP <80 MM HG: CPT | Performed by: INTERNAL MEDICINE

## 2025-05-01 PROCEDURE — G8400 PT W/DXA NO RESULTS DOC: HCPCS | Performed by: INTERNAL MEDICINE

## 2025-05-01 PROCEDURE — 1125F AMNT PAIN NOTED PAIN PRSNT: CPT | Performed by: INTERNAL MEDICINE

## 2025-05-01 PROCEDURE — 1090F PRES/ABSN URINE INCON ASSESS: CPT | Performed by: INTERNAL MEDICINE

## 2025-05-01 PROCEDURE — 99214 OFFICE O/P EST MOD 30 MIN: CPT | Performed by: INTERNAL MEDICINE

## 2025-05-01 PROCEDURE — G2211 COMPLEX E/M VISIT ADD ON: HCPCS | Performed by: INTERNAL MEDICINE

## 2025-05-01 PROCEDURE — G8417 CALC BMI ABV UP PARAM F/U: HCPCS | Performed by: INTERNAL MEDICINE

## 2025-05-01 PROCEDURE — G8428 CUR MEDS NOT DOCUMENT: HCPCS | Performed by: INTERNAL MEDICINE

## 2025-05-01 PROCEDURE — 1036F TOBACCO NON-USER: CPT | Performed by: INTERNAL MEDICINE

## 2025-05-01 PROCEDURE — 3046F HEMOGLOBIN A1C LEVEL >9.0%: CPT | Performed by: INTERNAL MEDICINE

## 2025-05-01 PROCEDURE — 95251 CONT GLUC MNTR ANALYSIS I&R: CPT | Performed by: INTERNAL MEDICINE

## 2025-05-01 PROCEDURE — 83036 HEMOGLOBIN GLYCOSYLATED A1C: CPT | Performed by: INTERNAL MEDICINE

## 2025-05-01 PROCEDURE — 1123F ACP DISCUSS/DSCN MKR DOCD: CPT | Performed by: INTERNAL MEDICINE

## 2025-05-01 PROCEDURE — 3074F SYST BP LT 130 MM HG: CPT | Performed by: INTERNAL MEDICINE

## 2025-05-01 PROCEDURE — PBSHW AMB POC HEMOGLOBIN A1C: Performed by: INTERNAL MEDICINE

## 2025-05-01 RX ORDER — OXYBUTYNIN CHLORIDE 5 MG/1
5 TABLET ORAL DAILY
COMMUNITY

## 2025-05-01 RX ORDER — HYOSCYAMINE SULFATE 0.12 MG/1
0.12 TABLET SUBLINGUAL EVERY 4 HOURS PRN
COMMUNITY

## 2025-05-01 RX ORDER — FUROSEMIDE 20 MG/1
30 TABLET ORAL 2 TIMES DAILY
COMMUNITY

## 2025-05-01 RX ORDER — LORAZEPAM 0.5 MG/1
0.5 TABLET ORAL EVERY 6 HOURS PRN
COMMUNITY

## 2025-05-01 RX ORDER — LOPERAMIDE HYDROCHLORIDE 2 MG/1
2 CAPSULE ORAL 4 TIMES DAILY PRN
COMMUNITY

## 2025-05-01 RX ORDER — FLUCONAZOLE 150 MG/1
150 TABLET ORAL WEEKLY
COMMUNITY

## 2025-05-01 RX ORDER — HALOPERIDOL 0.5 MG/1
0.5 TABLET ORAL PRN
COMMUNITY

## 2025-05-01 RX ORDER — GABAPENTIN 300 MG/1
300 CAPSULE ORAL 3 TIMES DAILY
COMMUNITY

## 2025-05-01 RX ORDER — DOXEPIN HYDROCHLORIDE 50 MG/1
50 CAPSULE ORAL NIGHTLY
COMMUNITY

## 2025-05-01 RX ORDER — MORPHINE SULFATE 10 MG/5ML
SOLUTION ORAL
COMMUNITY

## 2025-05-01 RX ORDER — GLYCERIN .002; .002; .01 MG/MG; MG/MG; MG/MG
1 SOLUTION/ DROPS OPHTHALMIC PRN
COMMUNITY

## 2025-05-01 NOTE — TELEPHONE ENCOUNTER
Please call Parker at [facility] by calling [phone] and inquire why patient is not receiving ozempic every Wednesday.  We had faxed on order in January to allow her to self administer this injection and it is on her med list but pt told me at her visit today that apparently the hospice nurse has to give it to her and has not been doing so for the past month even though the ozempic has been paid for and is at the facility.  I reprinted the letter and gave to the patient today to bring to the facility to ensure pt is able to give herself ozempic every Wednesday but if the hospice nurse needs to give this every Wednesday, then please make sure this gets done.  Thanks!

## 2025-05-01 NOTE — PROGRESS NOTES
the morning at the same time as 30 mg of prednisone  - cont lantus 25 units at bedtime  - cont novolog 10 units before meals + 2 units for every 50 mg/dl above 200 mg/dl  - restart ozempic 0.5 mg weekly   - check bs 4 times per day due to fluctuating sugars  - foot exam done 4/22  - microalbumin 83 in 4/22 and 22 in 5/24  - optho UTD 10/21        2. Essential hypertension, benign: her BP was at goal < 140/90   - cont coreg 6.25 mg bid  - cont lisinopril 10 mg daily          3. Hyperlipidemia LDL goal <100: LDL 97 and  in 4/22 on crestor 20 mg daily. LDL 78 and  in 7/22.  LDL up to 120 and  in 10/22 with missing some doses.   and  in 5/24 but unclear if she's still missing doses.  Zetia 10 mg was added during her hospital stay in 9/24 but was off this and off crestor as of 1/25 due to previously needing daptomycin but has completed this course so has resumed both  - cont zetia 10 mg daily  - cont crestor 20 mg daily   - check lipids with PCP in 5/25       4.  Edema  - cont lasix 20 mg 1.5 tabs bid      Patient Instructions   1) Your Hemoglobin A1c (3 month test of blood sugar) is 10.5% up from 8.5% but your sugars are running much higher off the ozempic and we need to clarify why you are not being given this as we did fax an order in January for you to receive this every Wednesday.      2) Your blood pressure is controlled.    3) Try to avoid juice as best as possible as this has too much sugar.        Return 8/14/25 at 12:10pm.        Copy sent to:  Irvin Vang MD Jones, Desmond MCKENNA MD (Pulmonary Disease)  Dr. Nella Bagley (cardiology)    The patient (or guardian, if applicable) and other individuals in attendance with the patient were advised that Artificial Intelligence will be utilized during this visit to record, process the conversation to generate a clinical note, and support improvement of the AI technology. The patient (or guardian, if applicable) and other individuals in

## 2025-05-01 NOTE — PATIENT INSTRUCTIONS
1) Your Hemoglobin A1c (3 month test of blood sugar) is 10.5% up from 8.5% but your sugars are running much higher off the ozempic and we need to clarify why you are not being given this as we did fax an order in January for you to receive this every Wednesday.      2) Your blood pressure is controlled.    3) Try to avoid juice as best as possible as this has too much sugar.

## 2025-05-01 NOTE — TELEPHONE ENCOUNTER
Contacted nurse Carmencita at [facility] and was told Ms Collins had not received Ozempic because she was on hospice and they would pay for the medication as it is hospice. Carmencita stated the on site physician wrote a prescription and order for pt to receive the injection weekly. Carmencita stated they do have pt's Ozempic there and she will start the injection. She says the hospice nurse is on site everyday and will be notified. She says she will speak with pt again as she has explained this to pt previously. Carmencita states she will communicate with pt regarding what day she would like to receive the injection.

## (undated) DEVICE — C315HIS02 OD7FR ID5.4FR L40CM C315

## (undated) DEVICE — INFECTION CONTROL KIT SYS

## (undated) DEVICE — SOLUTION IV 1000ML 0.9% SOD CHL

## (undated) DEVICE — PILLOW POS AD L7IN R FOAM HD REST INTUB SLOT DISP

## (undated) DEVICE — GAUZE SPONGES,12 PLY: Brand: CURITY

## (undated) DEVICE — DRAIN KT WND 10FR RND 400ML --

## (undated) DEVICE — BONE WAX WHITE: Brand: BONE WAX WHITE

## (undated) DEVICE — LAMINECTOMY RICHMOND-LF: Brand: MEDLINE INDUSTRIES, INC.

## (undated) DEVICE — APPLICATOR MEDICATED 26 CC SOLUTION HI LT ORNG CHLORAPREP

## (undated) DEVICE — CLOSURE SKIN FLX NONINVASIVE PRELOC TECHNOLOGY FOR 24IN

## (undated) DEVICE — PACEMAKER PACK: Brand: MEDLINE INDUSTRIES, INC.

## (undated) DEVICE — SUTURE NONABSORBABLE MONOFILAMENT CV-6 TTC-9 24 IN GORTX 6K02B

## (undated) DEVICE — SPONGE: SPECIALTY PEANUT XR 100/CS: Brand: MEDICAL ACTION INDUSTRIES

## (undated) DEVICE — SOLUTION IRRIG 3000ML 0.9% SOD CHL FLX CONT 0797208] ICU MEDICAL INC]

## (undated) DEVICE — SOLUTION IV 250ML 0.9% SOD CHL CLR INJ FLX BG CONT PRT CLSR

## (undated) DEVICE — BIPOLAR IRRIGATOR INTEGRATED TUBING AND BIPOLAR CORD SET, DISPOSABLE

## (undated) DEVICE — SUTURE PERMA-HAND SZ 0 L30IN NONABSORBABLE BLK L36MM CT-1 424H

## (undated) DEVICE — GUIDEWIRE VASC L80CM DIA0.018IN TIP L5CM 15DEG ANG NIT

## (undated) DEVICE — STRIP,CLOSURE,WOUND,MEDI-STRIP,1/2X4: Brand: MEDLINE

## (undated) DEVICE — KENDALL SCD EXPRESS SLEEVES, KNEE LENGTH, MEDIUM: Brand: KENDALL SCD

## (undated) DEVICE — COVER,MAYO STAND,STERILE: Brand: MEDLINE

## (undated) DEVICE — DRAPE XR C ARM 41X74IN LF --

## (undated) DEVICE — COVER,TABLE,HEAVY DUTY,60"X90",STRL: Brand: MEDLINE

## (undated) DEVICE — PREP KIT PEEL PTCH POVIDONE IOD

## (undated) DEVICE — PENCIL SMK EVAC ALL IN 1 DSGN ENH VISIBILITY IMPROVED AIR

## (undated) DEVICE — INTRODUCER SHTH L13CM OD7FR SH ORNG HUB SEAMLESS SAFSHTH

## (undated) DEVICE — DRESSING ANTIMIC FOAM OPTIFOAM POSTOP ADH 4 X 6 IN

## (undated) DEVICE — THERMOGARD PLUS ABC DUAL DISPERSIVE ELECTRODE: Brand: THERMOGARD

## (undated) DEVICE — HANDLE LT SNAP ON ULT DURABLE LENS FOR TRUMPF ALC DISPOSABLE

## (undated) DEVICE — STERILE POLYISOPRENE POWDER-FREE SURGICAL GLOVES WITH EMOLLIENT COATING: Brand: PROTEXIS

## (undated) DEVICE — TRAY CATH 16F URIN MTR LTX -- CONVERT TO ITEM 363111

## (undated) DEVICE — DURASEAL® DURAL SEALANT SYSTEM 5ML 5 PACK: Brand: DURASEAL®

## (undated) DEVICE — SUTURE MCRYL SZ 4-0 L18IN ABSRB UD L16MM PC-3 3/8 CIR PRIM Y845G

## (undated) DEVICE — TOOL 14MH30 LEGEND 14CM 3MM: Brand: MIDAS REX ™

## (undated) DEVICE — DRAPE,LAPAROTOMY,T,PEDI,STERILE: Brand: MEDLINE

## (undated) DEVICE — 3M™ IOBAN™ 2 ANTIMICROBIAL INCISE DRAPE 6640EZ: Brand: IOBAN™ 2

## (undated) DEVICE — RADIFOCUS GLIDEWIRE: Brand: GLIDEWIRE

## (undated) DEVICE — BOWL MED L 32OZ PLAS W/ MOLD GRAD EZ OPN PEEL PCH

## (undated) DEVICE — MEDI-TRACE CADENCE ADULT, DEFIBRILLATION ELECTRODE -RTS  (10 PR/PK) - PHYSIO-CONTROL: Brand: MEDI-TRACE CADENCE

## (undated) DEVICE — SUTURE ABSRB BRAID COAT UD OS-6 NO 1 27IN VCRL J535H

## (undated) DEVICE — PREP SKN PREVAIL 40ML APPL --

## (undated) DEVICE — SYRINGE IRRIG 60ML SFT PLIABLE BLB EZ TO GRP 1 HND USE W/

## (undated) DEVICE — MICROPUNCTURE INTRODUCER SET SILHOUETTE TRANSITIONLESS WITH NITINOL WIRE GUIDE: Brand: MICROPUNCTURE

## (undated) DEVICE — SUTURE MONOCRYL + ABSORBABLE MONOFILAMENT 2-0 CT1 12 IN UD SXMP1B412

## (undated) DEVICE — SUTURE MNFLMNT SH 26 MM 1/2 CI CRCLE TPR PNT SYMTRC PD PLU

## (undated) DEVICE — INSULATED BLADE ELECTRODE: Brand: EDGE

## (undated) DEVICE — COVER US PRB W15XL120CM W/ GEL RUBBERBAND TAPE STRP FLD GEN

## (undated) DEVICE — CATH IV AUTOGRD BC GRN 18GA 30 -- INSYTE

## (undated) DEVICE — NDL SPNE QNCKE 18GX3.5IN LF --

## (undated) DEVICE — 4-PORT MANIFOLD: Brand: NEPTUNE 2

## (undated) DEVICE — DRAPE C-ARMOUR C-ARM KIT --

## (undated) DEVICE — SUTURE VCRL 2-0 L27IN ABSRB UD CP-2 L26MM 1/2 CIR REV CUT J869H

## (undated) DEVICE — SUTURE MONOCRYL STRATAFIX SPRL + SZ 4-0 L12IN ABSRB UD PS-2 SXMP1B117

## (undated) DEVICE — ABDOMINAL PAD: Brand: DERMACEA

## (undated) DEVICE — MASTISOL ADHESIVE LIQ 2/3ML

## (undated) DEVICE — TOOL 14BA50 LEGEND 14CM 5MM BA: Brand: MIDAS REX ™

## (undated) DEVICE — SUTURE VCRL SZ 3-0 L27IN ABSRB UD CP-2 L26MM 1/2 CIR REV J868H

## (undated) DEVICE — HANDPIECE SET WITH BONE CLEANING TIP AND SUCTION TUBE: Brand: INTERPULSE

## (undated) DEVICE — SYR LR LCK 1ML GRAD NSAF 30ML --